# Patient Record
Sex: FEMALE | Race: WHITE | Employment: OTHER | URBAN - NONMETROPOLITAN AREA
[De-identification: names, ages, dates, MRNs, and addresses within clinical notes are randomized per-mention and may not be internally consistent; named-entity substitution may affect disease eponyms.]

---

## 2020-12-12 ENCOUNTER — HOSPITAL ENCOUNTER (EMERGENCY)
Age: 80
Discharge: HOME | End: 2020-12-12
Attending: EMERGENCY MEDICINE
Payer: MEDICARE

## 2020-12-12 VITALS
TEMPERATURE: 99.8 F | RESPIRATION RATE: 16 BRPM | WEIGHT: 140 LBS | DIASTOLIC BLOOD PRESSURE: 79 MMHG | HEART RATE: 79 BPM | BODY MASS INDEX: 25.76 KG/M2 | HEIGHT: 62 IN | SYSTOLIC BLOOD PRESSURE: 132 MMHG | OXYGEN SATURATION: 95 %

## 2020-12-12 PROCEDURE — 99283 EMERGENCY DEPT VISIT LOW MDM: CPT

## 2020-12-12 PROCEDURE — 6370000000 HC RX 637 (ALT 250 FOR IP): Performed by: EMERGENCY MEDICINE

## 2020-12-12 RX ORDER — CEPHALEXIN 500 MG/1
500 CAPSULE ORAL 3 TIMES DAILY
Qty: 30 CAPSULE | Refills: 0 | Status: SHIPPED | OUTPATIENT
Start: 2020-12-12 | End: 2020-12-22

## 2020-12-12 RX ORDER — CEPHALEXIN 500 MG/1
500 CAPSULE ORAL ONCE
Status: COMPLETED | OUTPATIENT
Start: 2020-12-12 | End: 2020-12-12

## 2020-12-12 RX ADMIN — CEPHALEXIN 500 MG: 500 CAPSULE ORAL at 20:27

## 2020-12-12 ASSESSMENT — PAIN SCALES - GENERAL: PAINLEVEL_OUTOF10: 3

## 2020-12-13 NOTE — ED PROVIDER NOTES
Emergency Department Attending Note    Vinny Gregg MD    Date of ED VIsit: 12/12/2020    CHIEF COMPLAINT  Abscess (Pt c/o redness and swelling to left middle finger x 3 days.)      HISTORY OF PRESENT ILLNESS  July Varghese is a 78 y.o. female  With Vital signs of BP (!) 155/88   Pulse 127   Temp 99.2 °F (37.3 °C) (Oral)   Resp 18   Ht 5' 2\" (1.575 m)   Wt 140 lb (63.5 kg)   SpO2 95%   BMI 25.61 kg/m²  who presents to the ED with a complaint of pain and redness to the left middle finger. Patient seen and evaluated in room 8. Patient comes in the emergency department 3 days after exploring what looks like it may have been a paronychia. But now the symptoms have gotten worse in the posterior aspect of her middle finger near the nail bed is red nonfluctuant. She expressed pus out of the area couple days ago and has been getting redder and that is what prompted her to come into the emergency department for evaluation. Also of note her heart rate was a little bit elevated but she has not taken her medication that keeps her heart rate down so not overly concerned about that at this point and advised her to take medication when she gets home. .  No other complaints, modifying factors or associated symptoms. Patients Past medical history reviewed and listed below  Past Medical History:   Diagnosis Date    Allergic rhinitis     Cancer ([address]     breast    GI bleed     Upper GI Bleed with knee surgery 2012    Glaucoma     bilateral    Hyperlipidemia     Hypertension     Radiation therapy complication 1103    UTI (lower urinary tract infection)      Past Surgical History:   Procedure Laterality Date    BREAST SURGERY  1999    lumpectomy right    CATARACT REMOVAL WITH IMPLANT      bilateral    HYSTERECTOMY      age 34    KNEE SURGERY  2/6/2012    left side       I have reviewed the following from the nursing documentation. No family history on file.   Social History     Socioeconomic History    Marital status:      Spouse name: Not on file    Number of children: Not on file    Years of education: Not on file    Highest education level: Not on file   Occupational History    Not on file   Social Needs    Financial resource strain: Not on file    Food insecurity     Worry: Not on file     Inability: Not on file    Transportation needs     Medical: Not on file     Non-medical: Not on file   Tobacco Use    Smoking status: Former Smoker     Quit date: 1997     Years since quittin.8    Smokeless tobacco: Never Used   Substance and Sexual Activity    Alcohol use: No    Drug use: No    Sexual activity: Not on file   Lifestyle    Physical activity     Days per week: Not on file     Minutes per session: Not on file    Stress: Not on file   Relationships    Social connections     Talks on phone: Not on file     Gets together: Not on file     Attends Islam service: Not on file     Active member of club or organization: Not on file     Attends meetings of clubs or organizations: Not on file     Relationship status: Not on file    Intimate partner violence     Fear of current or ex partner: Not on file     Emotionally abused: Not on file     Physically abused: Not on file     Forced sexual activity: Not on file   Other Topics Concern    Not on file   Social History Narrative    Not on file     No current facility-administered medications for this encounter. Current Outpatient Medications   Medication Sig Dispense Refill    Brinzolamide-Brimonidine (SIMBRINZA) 1-0.2 % SUSP Apply  to eye.  meloxicam (MOBIC) 15 MG tablet Take 15 mg by mouth daily.  aspirin EC 81 MG EC tablet Take 1 tablet by mouth daily. 1 tablet 0    Bimatoprost (LUMIGAN) 0.01 % SOLN Apply 1 drop to eye nightly. Each eye       paroxetine (PAXIL) 20 MG tablet Take 20 mg by mouth every morning.  lisinopril (PRINIVIL;ZESTRIL) 10 MG tablet Take 20 mg by mouth every morning.       simvastatin (ZOCOR) 40 MG tablet Take 40 mg by mouth every morning. Allergies   Allergen Reactions    Oxycodone        REVIEW OF SYSTEMS  10 systems reviewed, pertinent positives per HPI otherwise noted to be negative     PHYSICAL EXAM  BP (!) 155/88   Pulse 127   Temp 99.2 °F (37.3 °C) (Oral)   Resp 18   Ht 5' 2\" (1.575 m)   Wt 140 lb (63.5 kg)   SpO2 95%   BMI 25.61 kg/m²   GENERAL APPEARANCE: Awake and alert. Cooperative. In no obvious distress. HEAD: Normocephalic. Atraumatic. EYES: PERRL. EOM's grossly intact. ENT: Mucous membranes are pink and moist.   NECK: Supple. HEART: RRR. No murmurs. LUNGS: Respirations unlabored. CTAB. Good air exchange. ABDOMEN: Soft. Non-distended. Non-tender. No masses. No organomegaly. No guarding or rebound. EXTREMITIES: No peripheral edema. Moves all extremities equally. All extremities neurovascularly intact. There is posterior swelling and redness to the middle finger of the left hand no pus drainage no fluctuance is able to flex and extend and move the finger without any problems albeit with pain  SKIN: Warm and dry. No acute rashes. NEUROLOGICAL: Alert and oriented. Strength 5/5, sensation intact. Gait normal.   PSYCHIATRIC: Normal mood and affect. No HI or SI expressed to me. RADIOLOGY    If acquired see below     EKG:     If acquired see below       ED COURSE/MDM    So looks like the patient did her own self exploration of a paronychia and made it worse with now a slightly larger infection to the posterior aspect of the finger extending from the paronychia area. So I will not place her on an antibiotic primarily Keflex to a fight the infection. Telling her to give it 2 to 3 days to start working if it does not she should see her primary care physician for further follow-up. Additionally I told her she should warm soak it whenever possible.          The ED course and plan were reviewed and results discussed with the patient    The patient understood and agreed with the Discharge/transfer planning.     CLINICAL IMPRESSION and DISPOSITION    Rigoberto Guzman was stable and diagnosed with finger infection    Patient was treated with Aron Combs MD  12/12/20 Jodi Neil MD  12/12/20 [id number]

## 2021-04-17 ENCOUNTER — HOSPITAL ENCOUNTER (OUTPATIENT)
Age: 81
Discharge: HOME | End: 2021-04-17
Payer: MEDICARE

## 2021-04-17 LAB
A/G RATIO: 1.4 (ref 1.1–2.2)
ALBUMIN SERPL-MCNC: 4.1 G/DL (ref 3.4–5)
ALP BLD-CCNC: 214 U/L (ref 40–129)
ALT SERPL-CCNC: 10 U/L (ref 10–40)
ANION GAP SERPL CALCULATED.3IONS-SCNC: 12 MMOL/L (ref 3–16)
AST SERPL-CCNC: 21 U/L (ref 15–37)
BILIRUB SERPL-MCNC: 0.4 MG/DL (ref 0–1)
BUN BLDV-MCNC: 25 MG/DL (ref 7–20)
CALCIUM SERPL-MCNC: 9.6 MG/DL (ref 8.3–10.6)
CHLORIDE BLD-SCNC: 99 MMOL/L (ref 99–110)
CO2: 28 MMOL/L (ref 21–32)
CREAT SERPL-MCNC: 0.6 MG/DL (ref 0.6–1.2)
GFR AFRICAN AMERICAN: >60
GFR NON-AFRICAN AMERICAN: >60
GLOBULIN: 2.9 G/DL
GLUCOSE FASTING: 87 MG/DL (ref 70–99)
POTASSIUM SERPL-SCNC: 5.3 MMOL/L (ref 3.5–5.1)
SODIUM BLD-SCNC: 139 MMOL/L (ref 136–145)
TOTAL PROTEIN: 7 G/DL (ref 6.4–8.2)

## 2021-04-17 PROCEDURE — 85025 COMPLETE CBC W/AUTO DIFF WBC: CPT

## 2021-04-17 PROCEDURE — 83036 HEMOGLOBIN GLYCOSYLATED A1C: CPT

## 2021-04-17 PROCEDURE — 80053 COMPREHEN METABOLIC PANEL: CPT

## 2021-04-17 PROCEDURE — 82306 VITAMIN D 25 HYDROXY: CPT

## 2021-04-17 PROCEDURE — 80061 LIPID PANEL: CPT

## 2021-04-17 PROCEDURE — 84443 ASSAY THYROID STIM HORMONE: CPT

## 2021-04-17 PROCEDURE — 36415 COLL VENOUS BLD VENIPUNCTURE: CPT

## 2021-04-18 LAB
CHOLESTEROL, FASTING: 118 MG/DL (ref 0–199)
ESTIMATED AVERAGE GLUCOSE: 105.4 MG/DL
HBA1C MFR BLD: 5.3 %
HDLC SERPL-MCNC: 27 MG/DL (ref 40–60)
LDL CHOLESTEROL CALCULATED: 50 MG/DL
TRIGLYCERIDE, FASTING: 207 MG/DL (ref 0–150)
TSH SERPL DL<=0.05 MIU/L-ACNC: 2.55 UIU/ML (ref 0.27–4.2)
VITAMIN D 25-HYDROXY: 36.9 NG/ML
VLDLC SERPL CALC-MCNC: 41 MG/DL

## 2021-04-19 LAB
BANDED NEUTROPHILS RELATIVE PERCENT: 14 % (ref 0–7)
BASOPHILS ABSOLUTE: 0 K/UL (ref 0–0.2)
BASOPHILS RELATIVE PERCENT: 0 %
BLASTS RELATIVE PERCENT: 1 %
EOSINOPHILS ABSOLUTE: 3.8 K/UL (ref 0–0.6)
EOSINOPHILS RELATIVE PERCENT: 3 %
HCT VFR BLD CALC: 41.5 % (ref 36–48)
HEMATOLOGY PATH CONSULT: ABNORMAL
HEMATOLOGY PATH CONSULT: NORMAL
HEMOGLOBIN: 12.7 G/DL (ref 12–16)
LYMPHOCYTES ABSOLUTE: 3.8 K/UL (ref 1–5.1)
LYMPHOCYTES RELATIVE PERCENT: 3 %
MCH RBC QN AUTO: 28.4 PG (ref 26–34)
MCHC RBC AUTO-ENTMCNC: 30.6 G/DL (ref 31–36)
MCV RBC AUTO: 93.1 FL (ref 80–100)
METAMYELOCYTES RELATIVE PERCENT: 2 %
MONOCYTES ABSOLUTE: 10.1 K/UL (ref 0–1.3)
MONOCYTES RELATIVE PERCENT: 8 %
MONONUCLEAR UNIDENTIFIED CELLS: 2 %
MYELOCYTE PERCENT: 4 %
NEUTROPHILS ABSOLUTE: 107.5 K/UL (ref 1.7–7.7)
NEUTROPHILS RELATIVE PERCENT: 64 %
NUCLEATED RED BLOOD CELLS: 1 /100 WBC
PDW BLD-RTO: 16.4 % (ref 12.4–15.4)
PLATELET # BLD: 353 K/UL (ref 135–450)
PMV BLD AUTO: 8.7 FL (ref 5–10.5)
PROMYELOCYTES PERCENT: 1 %
RBC # BLD: 4.45 M/UL (ref 4–5.2)
WBC # BLD: 126.5 K/UL (ref 4–11)

## 2021-08-06 ENCOUNTER — HOSPITAL ENCOUNTER (EMERGENCY)
Age: 81
Discharge: HOME | End: 2021-08-06
Attending: EMERGENCY MEDICINE
Payer: MEDICARE

## 2021-08-06 VITALS
HEART RATE: 88 BPM | HEIGHT: 61 IN | TEMPERATURE: 98.4 F | SYSTOLIC BLOOD PRESSURE: 151 MMHG | WEIGHT: 125 LBS | OXYGEN SATURATION: 100 % | DIASTOLIC BLOOD PRESSURE: 76 MMHG | BODY MASS INDEX: 23.6 KG/M2 | RESPIRATION RATE: 16 BRPM

## 2021-08-06 DIAGNOSIS — R73.9 HYPERGLYCEMIA: ICD-10-CM

## 2021-08-06 DIAGNOSIS — F41.1 ANXIETY STATE: Primary | ICD-10-CM

## 2021-08-06 LAB
CHP ED QC CHECK: NORMAL
GLUCOSE BLD-MCNC: 125 MG/DL
GLUCOSE BLD-MCNC: 125 MG/DL (ref 70–99)
PERFORMED ON: ABNORMAL

## 2021-08-06 PROCEDURE — 99284 EMERGENCY DEPT VISIT MOD MDM: CPT

## 2021-08-07 NOTE — ED PROVIDER NOTES
81f  2 mos panic attack  Prev Hx of same  paxil med -   Under stress,    citalipram  Panic attack  Worried of sugard  Ran out of test strips  Now feels fine knowing sugar  Emergency Department Attending Note    Misty Jon MD    Date of ED VIsit: 2021    CHIEF COMPLAINT  Anxiety (Pt taken off her Glipizide last week. Currently having anxiety r/t her BGL which she can't check at home)      [address] [facility] [employer] [employer] [employer]  Maggy Mcgrath is a 78 y.o. female  With Vital signs of BP (!) 151/76   Pulse 88   Temp 98.4 °F (36.9 °C) (Oral)   Resp 16   Ht 5' 1\" (1.549 m)   Wt 125 lb (56.7 kg)   SpO2 100%   Breastfeeding No   BMI 23.62 kg/m²  who presents to the ED with a complaint of panic attack that is resolved because of concern for her blood sugar since her blood sugar medicine has been changed recently. Turns out her blood sugar was one twenty-five byPOCT testing. Patient has no symptoms and is actually eager to go to a wedding in [place]. . Patient seen and evaluated in room 7. No other complaints, modifying factors or associated symptoms. Patients Past medical history reviewed and listed below  Past Medical History:   Diagnosis Date    Allergic rhinitis     Cancer ([address]     breast    GI bleed     Upper GI Bleed with knee surgery     Glaucoma     bilateral    Hyperlipidemia     Hypertension     Radiation therapy complication 7548    UTI (lower urinary tract infection)      Past Surgical History:   Procedure Laterality Date    BREAST SURGERY      lumpectomy right    CATARACT REMOVAL WITH IMPLANT      bilateral    HYSTERECTOMY      age 34    KNEE SURGERY  2012    left side       I have reviewed the following from the nursing documentation. History reviewed. No pertinent family history.   Social History     Socioeconomic History    Marital status:      Spouse name: Not on file    Number of children: Not on file    Years of education: Not on file    Highest education level: Not on file   Occupational History    Not on file   Tobacco Use    Smoking status: Former Smoker     Quit date: 1997     Years since quittin.4    Smokeless tobacco: Never Used   Substance and Sexual Activity    Alcohol use: No    Drug use: No    Sexual activity: Not on file   Other Topics Concern    Not on file   Social History Narrative    Not on file     Social Determinants of Health     Financial Resource Strain:     Difficulty of Paying Living Expenses:    Food Insecurity:     Worried About Running Out of Food in the Last Year:     [address] in the Last Year:    Transportation Needs:     Lack of Transportation (Medical):  Lack of Transportation (Non-Medical):    Physical Activity:     Days of Exercise per Week:     Minutes of Exercise per Session:    Stress:     Feeling of Stress :    Social Connections:     Frequency of Communication with Friends and Family:     Frequency of Social Gatherings with Friends and Family:     Attends Zoroastrianism Services:     Active Member of Clubs or Organizations:     Attends Club or Organization Meetings:     Marital Status:    Intimate Partner Violence:     Fear of Current or Ex-Partner:     Emotionally Abused:     Physically Abused:     Sexually Abused:      No current facility-administered medications for this encounter. Current Outpatient Medications   Medication Sig Dispense Refill    Brinzolamide-Brimonidine (SIMBRINZA) 1-0.2 % SUSP Apply  to eye.  meloxicam (MOBIC) 15 MG tablet Take 15 mg by mouth daily.  aspirin EC 81 MG EC tablet Take 1 tablet by mouth daily. 1 tablet 0    Bimatoprost (LUMIGAN) 0.01 % SOLN Apply 1 drop to eye nightly. Each eye       paroxetine (PAXIL) 20 MG tablet Take 20 mg by mouth every morning.  lisinopril (PRINIVIL;ZESTRIL) 10 MG tablet Take 20 mg by mouth every morning.  simvastatin (ZOCOR) 40 MG tablet Take 40 mg by mouth every morning. Allergies   Allergen Reactions    Oxycodone        REVIEW OF SYSTEMS  10 systems reviewed, pertinent positives per HPI otherwise noted to be negative    PHYSICAL EXAM  BP (!) 151/76   Pulse 88   Temp 98.4 °F (36.9 °C) (Oral)   Resp 16   Ht 5' 1\" (1.549 m)   Wt 125 lb (56.7 kg)   SpO2 100%   Breastfeeding No   BMI 23.62 kg/m²   GENERAL APPEARANCE: Awake and alert. Cooperative. In no obvious distress. HEAD: Normocephalic. Atraumatic. EYES: PERRL. EOM's grossly intact. ENT: Mucous membranes are pink and moist.   NECK: Supple. EXTREMITIES: No peripheral edema. Moves all extremities equally. All extremities neurovascularly intact. SKIN: Warm and dry. No acute rashes. NEUROLOGICAL: Alert and oriented. Strength 5/5, sensation intact. Gait normal.   PSYCHIATRIC: Normal mood and affect. No HI or SI expressed to me. ED COURSE/MDM    So the patient's blood sugar was one twenty-five which calmed her down and put her 80s so she is eating eager to get on the road so she will be discharged with a diagnosis of mild hyperglycemia in the context of diabetes. ED Course as of Aug 06 2051   Fri Aug 06, 2021   2045 Glucose: 125 [DL]      ED Course User Index  [DL] Adela Sommer MD       The ED course and plan were reviewed and results discussed with the patient    The patient understood and agreed with the Discharge/transfer planning.     CLINICAL IMPRESSION and DISPOSITION    Coco Payan was stable and diagnosed with hyperglycemia and panic attack    Patient was treated with glucose check               Adela Sommer MD  08/06/21 2054

## 2021-09-17 ENCOUNTER — OFFICE VISIT (OUTPATIENT)
Dept: CARDIOLOGY CLINIC | Age: 81
End: 2021-09-17
Payer: MEDICARE

## 2021-09-17 VITALS
DIASTOLIC BLOOD PRESSURE: 42 MMHG | HEIGHT: 61 IN | WEIGHT: 132 LBS | OXYGEN SATURATION: 97 % | SYSTOLIC BLOOD PRESSURE: 118 MMHG | HEART RATE: 93 BPM | BODY MASS INDEX: 24.92 KG/M2

## 2021-09-17 DIAGNOSIS — I48.91 ATRIAL FIBRILLATION, UNSPECIFIED TYPE (HCC): ICD-10-CM

## 2021-09-17 DIAGNOSIS — G45.9 TIA (TRANSIENT ISCHEMIC ATTACK): ICD-10-CM

## 2021-09-17 DIAGNOSIS — I38 ENDOCARDITIS, UNSPECIFIED CHRONICITY, UNSPECIFIED ENDOCARDITIS TYPE: ICD-10-CM

## 2021-09-17 DIAGNOSIS — I10 ESSENTIAL HYPERTENSION: Primary | ICD-10-CM

## 2021-09-17 DIAGNOSIS — E78.5 HYPERLIPIDEMIA, UNSPECIFIED HYPERLIPIDEMIA TYPE: ICD-10-CM

## 2021-09-17 PROCEDURE — 99205 OFFICE O/P NEW HI 60 MIN: CPT | Performed by: INTERNAL MEDICINE

## 2021-09-17 PROCEDURE — 93000 ELECTROCARDIOGRAM COMPLETE: CPT | Performed by: INTERNAL MEDICINE

## 2021-09-17 RX ORDER — HYDROXYUREA 500 MG/1
CAPSULE ORAL DAILY
COMMUNITY
End: 2022-03-09

## 2021-09-17 RX ORDER — ACETAMINOPHEN 500 MG
500 TABLET ORAL EVERY 6 HOURS PRN
COMMUNITY

## 2021-09-17 RX ORDER — ALLOPURINOL 300 MG/1
300 TABLET ORAL DAILY
COMMUNITY

## 2021-09-17 NOTE — LETTER
Jose Cho  1940        CARDIOLOGY CONSULTATION        Patient Name: Jose Cho  Primary Care physician: Maribeth Aguilera DO    Reason for Referral/Chief Complaint: Jose Cho is a 78 y.o. patient who is referred to cardiology clinic today for evaluation and treatment of reported new AF, possible endocarditis (no records available). History of Present Illness:   Jose Cho is a 78 y.o. female with a past medical history of transient ischemic attack, hyperlipidemia, hypertension atrial fibrillation, breast cancer with prior surgery and radiation of the chest, and mild carotid stenosis. Patient referred to cardiology by [facility]. Patient presents today with her son. 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 Diagnosed with atrial fibrillation at that time. There is question of whether she was diagnosed with infective endocarditis. Today, patient reports that she was hospitalized for 7 days at [address] [place] [address] and then went to an inpatient rehab. She is unsure of whether she received oral antibiotics but strictly denies receiving prolonged course of IV antibiotic. She does confirm she was diagnosed with atrial fibrillation. Denies palpitations, near-syncope or poonam syncope. She gets occasional dizziness with position change, but this only lasts for 1-2 seconds. She has been taking metoprolol as well as anticoagulant. Her labs are followed through [facility] and she has had no bleeding events. States her cell lines have been good. She has not one with Dr. Gosia Jimenez later today and will again have her CBC checked. She reports she lives alone and is able to complete ADLs on her own. No limiting dyspnea. Denies exertional chest pain.    She reports she is no longer taking or lisinopril, simvastatin or ASA, but is taking all other medications as prescribed. She gets occasional BLE edema in her left leg at the end of the day. No paroxysmal nocturnal dyspnea, orthopnea or weight gain. Past Medical History:   has a past medical history of Allergic rhinitis, Cancer ([address] GI bleed, Glaucoma, Hyperlipidemia, Hypertension, Radiation therapy complication, and UTI (lower urinary tract infection). Surgical History:   has a past surgical history that includes Cataract removal with implant; Breast surgery (1999); Hysterectomy; and knee surgery (2/6/2012). Social History:   reports that she quit smoking about 24 years ago. She has never used smokeless tobacco. She reports that she does not drink alcohol and does not use drugs. Family History:  Reports no history of early onset coronary artery disease, myocardial infarction, cerebrovascular accident or sudden cardiac death among primary relatives. Home Medications:  Were reviewed and are listed in nursing record and/or below  Prior to Admission medications    Medication Sig Start Date End Date Taking? Authorizing Provider   hydroxyurea (HYDREA) 500 MG chemo capsule Take by mouth daily   Yes Historical Provider, MD   allopurinol (ZYLOPRIM) 300 MG tablet Take 300 mg by mouth daily   Yes Historical Provider, MD   metoprolol tartrate (LOPRESSOR) 25 MG tablet Take 25 mg by mouth 2 times daily   Yes Historical Provider, MD   apixaban (ELIQUIS) 5 MG TABS tablet Take by mouth 2 times daily   Yes Historical Provider, MD   acetaminophen (TYLENOL) 500 MG tablet Take 500 mg by mouth every 6 hours as needed for Pain   Yes Historical Provider, MD   Brinzolamide-Brimonidine (SIMBRINZA) 1-0.2 % SUSP Apply  to eye. Patient not taking: Reported on 9/17/2021    Historical Provider, MD   meloxicam (MOBIC) 15 MG tablet Take 15 mg by mouth daily. Patient not taking: Reported on 9/17/2021    Historical Provider, MD   Bimatoprost (LUMIGAN) 0.01 % SOLN Apply 1 drop to eye nightly.  Each eye   Patient not taking: Reported on 9/17/2021    Historical Provider, MD   paroxetine (PAXIL) 20 MG tablet Take 20 mg by mouth every morning. Patient not taking: Reported on 9/17/2021    Historical Provider, MD        CURRENT Medications:  No current facility-administered medications for this visit. Allergies:  Oxycodone     Review of Systems:   A 14 point review of symptoms completed. Pertinent positives identified in the HPI, all other review of symptoms negative as below. Objective:     Vitals:    09/17/21 0937 09/17/21 0939   BP: (!) 118/42 (!) 118/42   Pulse: 93    SpO2: 97%    Weight: 132 lb (59.9 kg)    Height: 5' 1\" (1.549 m)       Weight: 132 lb (59.9 kg)       PHYSICAL EXAM:    General:  Alert, cooperative, no distress, appears stated age   Head:  Normocephalic, atraumatic   Eyes:  Conjunctiva/corneas clear, anicteric sclerae    Nose: Nares normal, no drainage or sinus tenderness   Throat: No abnormalities of the lips, oral mucosa or tongue. Neck: Trachea midline. Neck supple with no lymphadenopathy, thyroid not enlarged, symmetric, no tenderness/mass/nodules, no Jugular venous pressure elevation    Lungs:   Clear to auscultation bilaterally, no wheezes, no rales, no respiratory distress   Chest Wall:  No deformity or tenderness to palpation   Heart:  Regular rate and rhythm, normal S1, normal S2, 2/6 systolic ejection murmur at the right sternal border with radiation to the carotid, early peaking with preserved A2, no rub, no S3/S4, PMI non-displaced. Abdomen:   Soft, non-tender, with normoactive bowel sounds. No masses, no hepatosplenomegaly   Extremities: No cyanosis, clubbing or significant pitting edema. Vascular: 2+ radial, 2+ dorsalis pedis and posterior tibial pulses bilaterally. Brisk carotid upstrokes without carotid bruit. Skin: Skin color, texture, turgor are normal with no rashes or ulceration.     Pysch: Euthymic mood, appropriate affect   Neurologic: Oriented to person, place and time. No slurred speech or facial asymmetry. No motor or sensory deficits on gross examination. Labs:   CBC:   Lab Results   Component Value Date    .5 04/17/2021    RBC 4.45 04/17/2021    HGB 12.7 04/17/2021    HCT 41.5 04/17/2021    MCV 93.1 04/17/2021    RDW 16.4 04/17/2021     04/17/2021     LABS from [facility]:      CMP:  Lab Results   Component Value Date     04/17/2021    K 5.3 04/17/2021    CL 99 04/17/2021    CO2 28 04/17/2021    BUN 25 04/17/2021    CREATININE 0.6 04/17/2021    GFRAA >60 04/17/2021    GFRAA 136 03/12/2012    AGRATIO 1.4 04/17/2021    LABGLOM >60 04/17/2021    GLUCOSE 125 08/06/2021    PROT 7.0 04/17/2021    CALCIUM 9.6 04/17/2021    BILITOT 0.4 04/17/2021    ALKPHOS 214 04/17/2021    AST 21 04/17/2021    ALT 10 04/17/2021     PT/INR:  No results found for: PTINR  HgBA1c:  Lab Results   Component Value Date    LABA1C 5.3 04/17/2021     Lab Results   Component Value Date    TROPONINI 0.007 01/22/2014         Cardiac Data:     EKG: Personally reviewed, notable for, with my interpretation: Normal sinus rhythm with normal axis, normal intervals, PAC, possible inferior infarct. Echo 1/2014:    Summary    Normal left ventricle size, wall thickness and systolic function with an    estimated ejection fraction of 55%. No regional wall motion abnormalities    are seen. Grade I diastolic dysfunction is present. Mitral valve is structurally normal.    Mild mitral regurgitation is present. The left atrial size is normal.    The aortic valve is normal in structure and function. No evidence of aortic valve regurgitation. No evidence of aortic stenosis. Carotid doppler bilateral 9/2014:   No hemodynamically significant lesion. No great change from the comparison study \"No significant plaque burden bilaterally\". Impression and Plan:       1. Paroxysmal atrial fibrillation, maintaining sinus exam/ECG today  2.   Reports possible history of endocarditis, unknown valve, unknown organism. Unclear to me that she received antibiotics, will need to confirm diagnosis and treatment plan by reviewing documentation from [place] which has been requested  3. Cardiac murmur, possible aortic stenosis, nonsevere by exam  4. Hypertension  5. Hyperlipidemia  6. Breast cancer status post surgery and radiation  7. History of transient ischemic attack  8. Carotid artery disease    [id number] Score for Atrial Fibrillation Stroke Risk   Risk   Factors  Component Value   C CHF No 0   H HTN Yes 1   A2 Age >= 76 Yes,  (78 y.o.) 2   D DM No 0   S2 Prior Stroke/TIA Yes 2   V Vascular Disease No 0   A Age 74-69 No,  (78 y.o.) 0   Sc Sex female 1    [id number]  Score  6   Score last updated 9/17/21 2:11 PM EDT    Click here for a link to the UpToDate guideline \"Atrial Fibrillation: Anticoagulation therapy to prevent embolization    Disclaimer: Risk Score calculation is dependent on accuracy of patient problem list and past encounter diagnosis. Patient Active Problem List   Diagnosis    Left knee DJD    TIA (transient ischemic attack)    Transient cerebral ischemia    Other and unspecified hyperlipidemia    Essential hypertension    Occlusion and stenosis of carotid artery    HLD (hyperlipidemia)    AF (atrial fibrillation) ([facility] [address]       PLAN:   1. We will obtain records from [address] [place] [address] for further review. 2. Will obtain transthoracic echocardiogram for evaluation of underlying cardiac structure and function. 3. Continue metoprolol and Eliquis twice daily for management of atrial fibrillation. 4.  Work closely with [facility] to ensure she tolerates anticoagulation. Monitor CBC closely   5. Recommended she elevate legs and wear compression stockings for conservative management of edema    Follow up with me in 6 months. This note has been scribed in the presence of Kieran Leslie MD, by Obi Jaramillo RN.      The scribes documentation has been prepared under my direction and personally reviewed by me in its entirety. I confirm that the note above accurately reflects all work, treatment, procedures, and medical decision making performed by me. Toni Lam MD, personally performed the services described in this documentation as scribed by Gustavo Haskins RN. in my presence, and it is both accurate and complete to the best of our ability. I will address the patient's cardiac risk factors and adjusted pharmacologic treatment as needed. In addition, I have reinforced the need for patient directed risk factor modification. All questions and concerns were addressed to the patient/family. Alternatives to my treatment were discussed. Thank you for allowing us to participate in the care of Isabela Mackenzie. Please call me with any questions [phone].     Mel Suggs MD, [facility]  Cardiovascular Disease  [facility]  [phone] [facility]  [phone] [address]  9/17/2021 5:00 PM

## 2021-09-17 NOTE — PROGRESS NOTES
CARDIOLOGY CONSULTATION        Patient Name: Anastacia Mak  Primary Care physician: Katelynn Meyer DO    Reason for Referral/Chief Complaint: Anastacia Mak is a 78 y.o. patient who is referred to cardiology clinic today for evaluation and treatment of reported new AF, possible endocarditis (no records available). History of Present Illness:   Anastacia Mak is a 78 y.o. female with a past medical history of transient ischemic attack, hyperlipidemia, hypertension atrial fibrillation, breast cancer with prior surgery and radiation of the chest, and mild carotid stenosis. Patient referred to cardiology by [facility]. Patient presents today with her son. 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 Diagnosed with atrial fibrillation at that time. There is question of whether she was diagnosed with infective endocarditis. Today, patient reports that she was hospitalized for 7 days at [address] [place] [address] and then went to an inpatient rehab. She is unsure of whether she received oral antibiotics but strictly denies receiving prolonged course of IV antibiotic. She does confirm she was diagnosed with atrial fibrillation. Denies palpitations, near-syncope or poonam syncope. She gets occasional dizziness with position change, but this only lasts for 1-2 seconds. She has been taking metoprolol as well as anticoagulant. Her labs are followed through [facility] and she has had no bleeding events. States her cell lines have been good. She has not one with Dr. Migue Gonzalez later today and will again have her CBC checked. She reports she lives alone and is able to complete ADLs on her own. No limiting dyspnea. Denies exertional chest pain. She reports she is no longer taking or lisinopril, simvastatin or ASA, but is taking all other medications as prescribed.  She Historical Provider, MD   paroxetine (PAXIL) 20 MG tablet Take 20 mg by mouth every morning. Patient not taking: Reported on 9/17/2021    Historical Provider, MD        CURRENT Medications:  No current facility-administered medications for this visit. Allergies:  Oxycodone     Review of Systems:   A 14 point review of symptoms completed. Pertinent positives identified in the HPI, all other review of symptoms negative as below. Objective:     Vitals:    09/17/21 0937 09/17/21 0939   BP: (!) 118/42 (!) 118/42   Pulse: 93    SpO2: 97%    Weight: 132 lb (59.9 kg)    Height: 5' 1\" (1.549 m)       Weight: 132 lb (59.9 kg)       PHYSICAL EXAM:    General:  Alert, cooperative, no distress, appears stated age   Head:  Normocephalic, atraumatic   Eyes:  Conjunctiva/corneas clear, anicteric sclerae    Nose: Nares normal, no drainage or sinus tenderness   Throat: No abnormalities of the lips, oral mucosa or tongue. Neck: Trachea midline. Neck supple with no lymphadenopathy, thyroid not enlarged, symmetric, no tenderness/mass/nodules, no Jugular venous pressure elevation    Lungs:   Clear to auscultation bilaterally, no wheezes, no rales, no respiratory distress   Chest Wall:  No deformity or tenderness to palpation   Heart:  Regular rate and rhythm, normal S1, normal S2, 2/6 systolic ejection murmur at the right sternal border with radiation to the carotid, early peaking with preserved A2, no rub, no S3/S4, PMI non-displaced. Abdomen:   Soft, non-tender, with normoactive bowel sounds. No masses, no hepatosplenomegaly   Extremities: No cyanosis, clubbing or significant pitting edema. Vascular: 2+ radial, 2+ dorsalis pedis and posterior tibial pulses bilaterally. Brisk carotid upstrokes without carotid bruit. Skin: Skin color, texture, turgor are normal with no rashes or ulceration. Pysch: Euthymic mood, appropriate affect   Neurologic: Oriented to person, place and time.  No slurred speech or facial asymmetry. No motor or sensory deficits on gross examination. Labs:   CBC:   Lab Results   Component Value Date    .5 04/17/2021    RBC 4.45 04/17/2021    HGB 12.7 04/17/2021    HCT 41.5 04/17/2021    MCV 93.1 04/17/2021    RDW 16.4 04/17/2021     04/17/2021     LABS from [facility]:      CMP:  Lab Results   Component Value Date     04/17/2021    K 5.3 04/17/2021    CL 99 04/17/2021    CO2 28 04/17/2021    BUN 25 04/17/2021    CREATININE 0.6 04/17/2021    GFRAA >60 04/17/2021    GFRAA 136 03/12/2012    AGRATIO 1.4 04/17/2021    LABGLOM >60 04/17/2021    GLUCOSE 125 08/06/2021    PROT 7.0 04/17/2021    CALCIUM 9.6 04/17/2021    BILITOT 0.4 04/17/2021    ALKPHOS 214 04/17/2021    AST 21 04/17/2021    ALT 10 04/17/2021     PT/INR:  No results found for: PTINR  HgBA1c:  Lab Results   Component Value Date    LABA1C 5.3 04/17/2021     Lab Results   Component Value Date    TROPONINI 0.007 01/22/2014         Cardiac Data:     EKG: Personally reviewed, notable for, with my interpretation: Normal sinus rhythm with normal axis, normal intervals, PAC, possible inferior infarct. Echo 1/2014:    Summary    Normal left ventricle size, wall thickness and systolic function with an    estimated ejection fraction of 55%. No regional wall motion abnormalities    are seen. Grade I diastolic dysfunction is present. Mitral valve is structurally normal.    Mild mitral regurgitation is present. The left atrial size is normal.    The aortic valve is normal in structure and function. No evidence of aortic valve regurgitation. No evidence of aortic stenosis. Carotid doppler bilateral 9/2014:   No hemodynamically significant lesion. No great change from the comparison study \"No significant plaque burden bilaterally\". Impression and Plan:       1. Paroxysmal atrial fibrillation, maintaining sinus exam/ECG today  2. Reports possible history of endocarditis, unknown valve, unknown organism. Unclear to me that she received antibiotics, will need to confirm diagnosis and treatment plan by reviewing documentation from [place] which has been requested  3. Cardiac murmur, possible aortic stenosis, nonsevere by exam  4. Hypertension  5. Hyperlipidemia  6. Breast cancer status post surgery and radiation  7. History of transient ischemic attack  8. Carotid artery disease    [id number] Score for Atrial Fibrillation Stroke Risk   Risk   Factors  Component Value   C CHF No 0   H HTN Yes 1   A2 Age >= 76 Yes,  (78 y.o.) 2   D DM No 0   S2 Prior Stroke/TIA Yes 2   V Vascular Disease No 0   A Age 74-69 No,  (78 y.o.) 0   Sc Sex female 1    [id number]  Score  6   Score last updated 9/17/21 1:65 PM EDT    Click here for a link to the UpToDate guideline \"Atrial Fibrillation: Anticoagulation therapy to prevent embolization    Disclaimer: Risk Score calculation is dependent on accuracy of patient problem list and past encounter diagnosis. Patient Active Problem List   Diagnosis    Left knee DJD    TIA (transient ischemic attack)    Transient cerebral ischemia    Other and unspecified hyperlipidemia    Essential hypertension    Occlusion and stenosis of carotid artery    HLD (hyperlipidemia)    AF (atrial fibrillation) ([facility] [address]       PLAN:   1. We will obtain records from [address] [place] [address] for further review. 2. Will obtain transthoracic echocardiogram for evaluation of underlying cardiac structure and function. 3. Continue metoprolol and Eliquis twice daily for management of atrial fibrillation. 4.  Work closely with [facility] to ensure she tolerates anticoagulation. Monitor CBC closely   5. Recommended she elevate legs and wear compression stockings for conservative management of edema    Follow up with me in 6 months. This note has been scribed in the presence of Jj Girard MD, by Mayuri Phelps RN.      The scribes documentation has been prepared under my direction and personally reviewed by me in its entirety. I confirm that the note above accurately reflects all work, treatment, procedures, and medical decision making performed by me. Catherine Starks MD, personally performed the services described in this documentation as scribed by Trevor Knapp RN. in my presence, and it is both accurate and complete to the best of our ability. I will address the patient's cardiac risk factors and adjusted pharmacologic treatment as needed. In addition, I have reinforced the need for patient directed risk factor modification. All questions and concerns were addressed to the patient/family. Alternatives to my treatment were discussed. Thank you for allowing us to participate in the care of Cynthia Morales. Please call me with any questions [phone].     Andre Conway MD, [facility]  Cardiovascular Disease  [facility]  [phone] [address] [facility]  [phone] [address]  9/17/2021 5:00 PM

## 2021-09-17 NOTE — PATIENT INSTRUCTIONS
PLAN:   1. We will obtain records from [facility]. 2. Echo to assess heart structures and function. 3. Elevate legs and wear compression stockings if you experience lower leg edema. 4. Follow up with me in 6 months. Your provider has ordered testing for further evaluation. An order/prescription has been included in your paper work.  To schedule outpatient testing, contact Central Scheduling by calling [address] [place] [address] ([phone]).

## 2021-09-30 ENCOUNTER — HOSPITAL ENCOUNTER (OUTPATIENT)
Dept: NON INVASIVE DIAGNOSTICS | Age: 81
Discharge: HOME | End: 2021-09-30
Payer: MEDICARE

## 2021-09-30 DIAGNOSIS — I38 ENDOCARDITIS, UNSPECIFIED CHRONICITY, UNSPECIFIED ENDOCARDITIS TYPE: ICD-10-CM

## 2021-09-30 LAB
LV EF: 58 %
LVEF MODALITY: NORMAL

## 2021-09-30 PROCEDURE — 93306 TTE W/DOPPLER COMPLETE: CPT

## 2021-10-01 ENCOUNTER — TELEPHONE (OUTPATIENT)
Dept: CARDIOLOGY CLINIC | Age: 81
End: 2021-10-01

## 2021-10-01 NOTE — TELEPHONE ENCOUNTER
----- Message from Mihai Gray MD sent at 9/30/2021  4:07 PM EDT -----  Pls let the patient know, echocardiogram shows normal pump function and strength. There is evidence of a possible healed vegetation of the aortic valve, this may have been what was treated in [place]. No significant sequelae of infection, only mild leakiness through the valve. Will monitor her clinically. Follow up as planned 6 months unless issues arise in the interim.

## 2021-10-04 ENCOUNTER — HOSPITAL ENCOUNTER (OUTPATIENT)
Age: 81
Discharge: HOME | End: 2021-10-04
Payer: MEDICARE

## 2021-10-04 LAB
ABO/RH: NORMAL
ANTIBODY SCREEN: NORMAL

## 2021-10-04 PROCEDURE — 36415 COLL VENOUS BLD VENIPUNCTURE: CPT

## 2021-10-04 PROCEDURE — 86923 COMPATIBILITY TEST ELECTRIC: CPT

## 2021-10-04 PROCEDURE — 86850 RBC ANTIBODY SCREEN: CPT

## 2021-10-04 PROCEDURE — 86901 BLOOD TYPING SEROLOGIC RH(D): CPT

## 2021-10-04 PROCEDURE — 86900 BLOOD TYPING SEROLOGIC ABO: CPT

## 2021-10-06 ENCOUNTER — HOSPITAL ENCOUNTER (OUTPATIENT)
Dept: NURSING | Age: 81
Setting detail: INFUSION SERIES
Discharge: HOME | End: 2021-10-06
Payer: MEDICARE

## 2021-10-06 VITALS
BODY MASS INDEX: 24.92 KG/M2 | HEIGHT: 61 IN | DIASTOLIC BLOOD PRESSURE: 64 MMHG | HEART RATE: 78 BPM | TEMPERATURE: 98.1 F | RESPIRATION RATE: 16 BRPM | SYSTOLIC BLOOD PRESSURE: 120 MMHG | WEIGHT: 132 LBS

## 2021-10-06 LAB
BLOOD BANK DISPENSE STATUS: NORMAL
BLOOD BANK PRODUCT CODE: NORMAL
BPU ID: NORMAL
DESCRIPTION BLOOD BANK: NORMAL

## 2021-10-06 PROCEDURE — P9016 RBC LEUKOCYTES REDUCED: HCPCS

## 2021-10-06 PROCEDURE — 36430 TRANSFUSION BLD/BLD COMPNT: CPT

## 2021-10-06 PROCEDURE — 99203 OFFICE O/P NEW LOW 30 MIN: CPT

## 2021-10-06 PROCEDURE — 6370000000 HC RX 637 (ALT 250 FOR IP): Performed by: INTERNAL MEDICINE

## 2021-10-06 RX ORDER — ACETAMINOPHEN 325 MG/1
650 TABLET ORAL ONCE
Status: COMPLETED | OUTPATIENT
Start: 2021-10-06 | End: 2021-10-06

## 2021-10-06 RX ADMIN — ACETAMINOPHEN 650 MG: 325 TABLET ORAL at 07:49

## 2021-10-06 ASSESSMENT — PAIN SCALES - GENERAL: PAINLEVEL_OUTOF10: 0

## 2021-10-06 NOTE — PROGRESS NOTES
Pt here ambulatory for 1 unit of PRBC's  pt without c/o's at present time but reports that she is very tired all the time  Current H&H is 7.8 & 25.0  Blood transfusion process discussed with pt and her family and understanding verbalized   Blood consent obtained  IV # 20 was   started in LAC on 1st attempt per myself  Pt cassandra well  Pre meds of tylenol 650 mg was given then  Unit of PRBC's started  IV site unremarkable  Pt cassandra well  Will stay with pt for 15 mins to monitor for signs of adverse reactions  Blankets have been applied for comfort  Legs elevated

## 2021-10-06 NOTE — PROGRESS NOTES
Have been at pt's side for 15 mins plus Blood infusing well without any signs of adverse reactions  IV site unremarkable  Pt refused breakfast today  Will continue to monitor  Family remains at side

## 2021-10-06 NOTE — PROGRESS NOTES
No changes noted  Pt has been resting quietly and dozing at times  Blood continues to infuse without any signs of adverse reactions  IV site unremarkable   Will continue to monitor  Family remains at side

## 2021-10-06 NOTE — PROGRESS NOTES
Blood tubing clear  IV was removed  Cath tip intact  Pressure then pressure dressing applied and was secured with a coban dressing  IV site unremarkable  Pt cassandra well   Discharge instructions were then reviewed with pt and her son  Understanding was verbalized and copy was given  Pt was then discharged ambulatory in stable condition with her son

## 2022-02-02 ENCOUNTER — HOSPITAL ENCOUNTER (EMERGENCY)
Age: 82
Discharge: HOME | End: 2022-02-03
Attending: EMERGENCY MEDICINE
Payer: MEDICARE

## 2022-02-02 ENCOUNTER — APPOINTMENT (OUTPATIENT)
Dept: GENERAL RADIOLOGY | Age: 82
End: 2022-02-02
Payer: MEDICARE

## 2022-02-02 DIAGNOSIS — K22.4 ESOPHAGEAL SPASM: Primary | ICD-10-CM

## 2022-02-02 LAB
A/G RATIO: 1.9 (ref 1.1–2.2)
ALBUMIN SERPL-MCNC: 4.4 G/DL (ref 3.4–5)
ALP BLD-CCNC: 119 U/L (ref 40–129)
ALT SERPL-CCNC: 7 U/L (ref 10–40)
ANION GAP SERPL CALCULATED.3IONS-SCNC: 14 MMOL/L (ref 3–16)
AST SERPL-CCNC: 7 U/L (ref 15–37)
BILIRUB SERPL-MCNC: 0.5 MG/DL (ref 0–1)
BUN BLDV-MCNC: 25 MG/DL (ref 7–20)
CALCIUM SERPL-MCNC: 9.3 MG/DL (ref 8.3–10.6)
CHLORIDE BLD-SCNC: 99 MMOL/L (ref 99–110)
CO2: 23 MMOL/L (ref 21–32)
CREAT SERPL-MCNC: 0.8 MG/DL (ref 0.6–1.2)
GFR AFRICAN AMERICAN: >60
GFR NON-AFRICAN AMERICAN: >60
GLUCOSE BLD-MCNC: 206 MG/DL (ref 70–99)
POTASSIUM REFLEX MAGNESIUM: 4.1 MMOL/L (ref 3.5–5.1)
SODIUM BLD-SCNC: 136 MMOL/L (ref 136–145)
TOTAL PROTEIN: 6.7 G/DL (ref 6.4–8.2)
TROPONIN: <0.01 NG/ML

## 2022-02-02 PROCEDURE — 80053 COMPREHEN METABOLIC PANEL: CPT

## 2022-02-02 PROCEDURE — 2580000003 HC RX 258: Performed by: EMERGENCY MEDICINE

## 2022-02-02 PROCEDURE — 6370000000 HC RX 637 (ALT 250 FOR IP)

## 2022-02-02 PROCEDURE — 6370000000 HC RX 637 (ALT 250 FOR IP): Performed by: EMERGENCY MEDICINE

## 2022-02-02 PROCEDURE — 85025 COMPLETE CBC W/AUTO DIFF WBC: CPT

## 2022-02-02 PROCEDURE — 93005 ELECTROCARDIOGRAM TRACING: CPT | Performed by: EMERGENCY MEDICINE

## 2022-02-02 PROCEDURE — 84484 ASSAY OF TROPONIN QUANT: CPT

## 2022-02-02 PROCEDURE — 99285 EMERGENCY DEPT VISIT HI MDM: CPT

## 2022-02-02 PROCEDURE — 36415 COLL VENOUS BLD VENIPUNCTURE: CPT

## 2022-02-02 PROCEDURE — 71045 X-RAY EXAM CHEST 1 VIEW: CPT

## 2022-02-02 RX ORDER — FAMOTIDINE 20 MG/1
TABLET, FILM COATED ORAL
Status: ON HOLD | COMMUNITY
Start: 2022-01-20 | End: 2022-02-15 | Stop reason: HOSPADM

## 2022-02-02 RX ORDER — NITROGLYCERIN 0.4 MG/1
0.4 TABLET SUBLINGUAL ONCE
Status: COMPLETED | OUTPATIENT
Start: 2022-02-02 | End: 2022-02-03

## 2022-02-02 RX ORDER — PANTOPRAZOLE SODIUM 40 MG/1
40 TABLET, DELAYED RELEASE ORAL
Status: DISCONTINUED | OUTPATIENT
Start: 2022-02-03 | End: 2022-02-03 | Stop reason: HOSPADM

## 2022-02-02 RX ORDER — PANTOPRAZOLE SODIUM 40 MG/1
TABLET, DELAYED RELEASE ORAL
Status: COMPLETED
Start: 2022-02-02 | End: 2022-02-02

## 2022-02-02 RX ORDER — 0.9 % SODIUM CHLORIDE 0.9 %
1000 INTRAVENOUS SOLUTION INTRAVENOUS ONCE
Status: COMPLETED | OUTPATIENT
Start: 2022-02-02 | End: 2022-02-02

## 2022-02-02 RX ORDER — ASPIRIN 325 MG
325 TABLET ORAL ONCE
Status: COMPLETED | OUTPATIENT
Start: 2022-02-02 | End: 2022-02-02

## 2022-02-02 RX ADMIN — PANTOPRAZOLE SODIUM 40 MG: 40 TABLET, DELAYED RELEASE ORAL at 23:07

## 2022-02-02 RX ADMIN — LIDOCAINE HYDROCHLORIDE: 20 SOLUTION ORAL; TOPICAL at 23:04

## 2022-02-02 RX ADMIN — ASPIRIN 325 MG: 325 TABLET ORAL at 23:04

## 2022-02-02 RX ADMIN — SODIUM CHLORIDE 1000 ML: 9 INJECTION, SOLUTION INTRAVENOUS at 22:40

## 2022-02-02 RX ADMIN — NITROGLYCERIN 0.4 MG: 0.4 TABLET SUBLINGUAL at 23:17

## 2022-02-02 ASSESSMENT — PAIN SCALES - GENERAL
PAINLEVEL_OUTOF10: 8
PAINLEVEL_OUTOF10: 0
PAINLEVEL_OUTOF10: 10

## 2022-02-03 VITALS
TEMPERATURE: 98.2 F | HEART RATE: 79 BPM | RESPIRATION RATE: 18 BRPM | WEIGHT: 130 LBS | BODY MASS INDEX: 24.55 KG/M2 | HEIGHT: 61 IN | SYSTOLIC BLOOD PRESSURE: 136 MMHG | DIASTOLIC BLOOD PRESSURE: 62 MMHG | OXYGEN SATURATION: 94 %

## 2022-02-03 LAB
BASOPHILS ABSOLUTE: 0 K/UL (ref 0–0.2)
BASOPHILS RELATIVE PERCENT: 0.3 %
EKG ATRIAL RATE: 91 BPM
EKG DIAGNOSIS: NORMAL
EKG P AXIS: 42 DEGREES
EKG P-R INTERVAL: 162 MS
EKG Q-T INTERVAL: 354 MS
EKG QRS DURATION: 90 MS
EKG QTC CALCULATION (BAZETT): 435 MS
EKG R AXIS: 36 DEGREES
EKG T AXIS: -23 DEGREES
EKG VENTRICULAR RATE: 91 BPM
EOSINOPHILS ABSOLUTE: 0.1 K/UL (ref 0–0.6)
EOSINOPHILS RELATIVE PERCENT: 0.8 %
HCT VFR BLD CALC: 26.2 % (ref 36–48)
HEMATOLOGY PATH CONSULT: NO
HEMOGLOBIN: 8.4 G/DL (ref 12–16)
LYMPHOCYTES ABSOLUTE: 1.1 K/UL (ref 1–5.1)
LYMPHOCYTES RELATIVE PERCENT: 7.4 %
MCH RBC QN AUTO: 37.2 PG (ref 26–34)
MCHC RBC AUTO-ENTMCNC: 32 G/DL (ref 31–36)
MCV RBC AUTO: 116.1 FL (ref 80–100)
MONOCYTES ABSOLUTE: 0.6 K/UL (ref 0–1.3)
MONOCYTES RELATIVE PERCENT: 3.8 %
NEUTROPHILS ABSOLUTE: 13.3 K/UL (ref 1.7–7.7)
NEUTROPHILS RELATIVE PERCENT: 87.7 %
PDW BLD-RTO: 23.7 % (ref 12.4–15.4)
PLATELET # BLD: 112 K/UL (ref 135–450)
PMV BLD AUTO: 7.9 FL (ref 5–10.5)
RBC # BLD: 2.26 M/UL (ref 4–5.2)
TROPONIN: <0.01 NG/ML
WBC # BLD: 15.2 K/UL (ref 4–11)

## 2022-02-03 PROCEDURE — 36415 COLL VENOUS BLD VENIPUNCTURE: CPT

## 2022-02-03 PROCEDURE — 93010 ELECTROCARDIOGRAM REPORT: CPT | Performed by: INTERNAL MEDICINE

## 2022-02-03 PROCEDURE — 84484 ASSAY OF TROPONIN QUANT: CPT

## 2022-02-03 PROCEDURE — 6370000000 HC RX 637 (ALT 250 FOR IP)

## 2022-02-03 RX ORDER — NITROGLYCERIN 0.4 MG/1
TABLET SUBLINGUAL
Status: COMPLETED
Start: 2022-02-03 | End: 2022-02-03

## 2022-02-03 RX ORDER — NIFEDIPINE 30 MG/1
30 TABLET, EXTENDED RELEASE ORAL DAILY
Qty: 7 TABLET | Refills: 0 | Status: SHIPPED | OUTPATIENT
Start: 2022-02-03 | End: 2022-03-09

## 2022-02-03 RX ADMIN — NITROGLYCERIN 0.4 MG: 0.4 TABLET SUBLINGUAL at 00:46

## 2022-02-03 ASSESSMENT — PAIN SCALES - GENERAL: PAINLEVEL_OUTOF10: 0

## 2022-02-03 NOTE — ED NOTES
Repeat troponin collected at this time. Pt stating she is still pain free.      Anna Lindo RN  02/03/22 2949

## 2022-02-03 NOTE — ED PROVIDER NOTES
Emergency Department Attending Note    Author MD Kennedy    Date of ED VIsit: 2/2/2022    CHIEF COMPLAINT  Chest Pain (Pt ambulates into ED with complaints of intermittent chest pain that has been going on for 3 days. States it is usually worse at night. But today it has been going on all day today. States pain comes and goes and lasts about 10-15 minutes per episode. Pt states she is not sure if it is acid reflux or not. Pain noted to misternal area.)      HISTORY OF PRESENT ILLNESS  Patrick Felipe is a 78 y.o. female  With Vital signs of BP (!) 158/83   Pulse 85   Temp 98.2 °F (36.8 °C) (Oral)   Resp 18   Ht 5' 1\" (1.549 m)   Wt 130 lb (59 kg)   SpO2 100%   BMI 24.56 kg/m²  who presents to the ED with a complaint of crescendo type chest pain for the past 3 days that lasts approximately 5 to 10 minutes and then resolves on its own. Patient describes it as a burning type sensation. Sometimes it radiates to the back but not all the time. As mentioned this is been going on for 3 days and she has never had this before and is not related to exertion. She says it is worse when she lays down at night. And goes away when she sits up. There is never any shortness of breath or diaphoresis with this. .  Patient's HEART score is below patient seen and evaluated in room 7. No other complaints, modifying factors or associated symptoms. HEART SCORE:    History: +0 for low suspicion  EKG: +1 for nonspecific changes   Age: +4 for age >65 years  Risk factors (includes HLD, HTN, DM, tobacco use, obesity, and +FHx): +1 for 1-2 risk factors  Initial troponin: +0 for negative troponin    Heart score: 4.   This falls under the following category: Score of 4-6, which indicates low/moderate risk for major adverse cardiac event and supports observation with repeated troponins and/or non-invasive testing      Patients Past medical history reviewed and listed below  Past Medical History:   Diagnosis Date    Allergic rhinitis     Cancer ([facility] [address]     breast    GI bleed     Upper GI Bleed with knee surgery     Glaucoma     bilateral    Hyperlipidemia     Hypertension     Myelodysplastic syndrome ([facility] [address]     Radiation therapy complication 5275    UTI (lower urinary tract infection)      Past Surgical History:   Procedure Laterality Date    BREAST SURGERY      lumpectomy right    CATARACT REMOVAL WITH IMPLANT      bilateral    HYSTERECTOMY      age 34    KNEE SURGERY  2012    left side       I have reviewed the following from the nursing documentation. History reviewed. No pertinent family history. Social History     Socioeconomic History    Marital status:      Spouse name: Not on file    Number of children: Not on file    Years of education: Not on file    Highest education level: Not on file   Occupational History    Not on file   Tobacco Use    Smoking status: Former Smoker     Quit date: 1997     Years since quittin.9    Smokeless tobacco: Never Used   Vaping Use    Vaping Use: Never used   Substance and Sexual Activity    Alcohol use: No    Drug use: No    Sexual activity: Not on file   Other Topics Concern    Not on file   Social History Narrative    Not on file     Social Determinants of Health     Financial Resource Strain:     Difficulty of Paying Living Expenses: Not on file   Food Insecurity:     Worried About Running Out of Food in the Last Year: Not on file    Lidia of Food in the Last Year: Not on file   Transportation Needs:     Lack of Transportation (Medical): Not on file    Lack of Transportation (Non-Medical):  Not on file   Physical Activity:     Days of Exercise per Week: Not on file    Minutes of Exercise per Session: Not on file   Stress:     Feeling of Stress : Not on file   Social Connections:     Frequency of Communication with Friends and Family: Not on file    Frequency of Social Gatherings with Friends and Family: Not on file    Attends Faith Services: Not on file    Active Member of Clubs or Organizations: Not on file    Attends Club or Organization Meetings: Not on file    Marital Status: Not on file   Intimate Partner Violence:     Fear of Current or Ex-Partner: Not on file    Emotionally Abused: Not on file    Physically Abused: Not on file    Sexually Abused: Not on file   Housing Stability:     Unable to Pay for Housing in the Last Year: Not on file    Number of [place] in the Last Year: Not on file    Unstable Housing in the Last Year: Not on file     Current Facility-Administered Medications   Medication Dose Route Frequency Provider Last Rate Last Admin    0.9 % sodium chloride bolus  1,000 mL IntraVENous Once Nisha Soria MD 1,000 mL/hr at 02/02/22 2240 1,000 mL at 02/02/22 2240     Current Outpatient Medications   Medication Sig Dispense Refill    famotidine (PEPCID) 20 MG tablet TAKE ONE TABLET BY MOUTH TWICE A DAY AS NEEDED FOR HEARTBURN      sertraline (ZOLOFT) 50 MG tablet TAKE ONE TABLET BY MOUTH DAILY      hydroxyurea (HYDREA) 500 MG chemo capsule Take by mouth daily      allopurinol (ZYLOPRIM) 300 MG tablet Take 300 mg by mouth daily      metoprolol tartrate (LOPRESSOR) 25 MG tablet Take 25 mg by mouth 2 times daily      apixaban (ELIQUIS) 5 MG TABS tablet Take by mouth 2 times daily      acetaminophen (TYLENOL) 500 MG tablet Take 500 mg by mouth every 6 hours as needed for Pain      Brinzolamide-Brimonidine (SIMBRINZA) 1-0.2 % SUSP Apply to eye       Bimatoprost (LUMIGAN) 0.01 % SOLN Apply 1 drop to eye nightly Each eye        Allergies   Allergen Reactions    Oxycodone        REVIEW OF SYSTEMS  10 systems reviewed, pertinent positives per HPI otherwise noted to be negative     PHYSICAL EXAM  BP (!) 158/83   Pulse 85   Temp 98.2 °F (36.8 °C) (Oral)   Resp 18   Ht 5' 1\" (1.549 m)   Wt 130 lb (59 kg)   SpO2 100%   BMI 24.56 kg/m²   GENERAL APPEARANCE: Awake and alert. Cooperative.  In no obvious distress. HEAD: Normocephalic. Atraumatic. EYES: PERRL. EOM's grossly intact. ENT: Mucous membranes are pink and moist.   NECK: Supple. HEART: RRR.  3/6 holosystolic murmurs. There is no chest wall tenderness to palpation  LUNGS: Respirations unlabored. CTAB. Good air exchange. ABDOMEN: Soft. Non-distended. Non-tender. No masses. No organomegaly. No guarding or rebound. EXTREMITIES: No peripheral edema. Moves all extremities equally. All extremities neurovascularly intact. SKIN: Warm and dry. No acute rashes. NEUROLOGICAL: Alert and oriented. Strength 5/5, sensation intact. Gait normal.   PSYCHIATRIC: Normal mood and affect. No HI or SI expressed to me. RADIOLOGY    If acquired see below     EKG:     If acquired see below       ED COURSE/MDM    The patient stuck around for almost 3 hours for her series of tests which included a chest x-ray and EKG and cardiac markers. While she was here she had a couple bouts of this pain she describes the pain as being more muscle like a muscle spasm-like that gets progressively worse progressively worse until it relieves itself spontaneously. It never lasts more than 5minutes. She says that usually at night and and father further questioning with the patient it sounds like she usually eats about 1 hour before she goes to bed which I explained to her might be a contributing cause to GERD and GERD may be a contributing cause to esophageal spasm which is what I think this patient has. So we had some shared decision making regarding the test results as well as algorithmic HEART score and she feels comfortable going home and seeing her doctor who she has an appointment with on Friday for further testing. She understood very clearly that she needed to get further testing to find out for sure if this was her heart or not I felt the same way despite her heart score being 4 that still put her in the relatively low to moderate risk profile.   She was also advised that if she had pain that lasted more than 5 minutes and lasted a longer time that she should come back to the emergency department she was also told that if it felt different she should come back into the emergency department as well. So at this point with shared decision making the patient is going to go home with her daughter. I am going to place her on a low level calcium channel blocker to see if that helps with her symptoms. Also can give her a PPI for the GERD which may be the causative factor here. She can then talk to her doctor on Friday if she he wants to continue her on these medications. ED Course as of 02/03/22 0145 Wed Feb 02, 2022   2349 CBC Auto Differential(!):    WBC 15.2(!)   RBC 2.26(!)   Hemoglobin Quant 8.4(!)   Hematocrit 26.2(!)   .1(!)   MCH 37.2(!)   MCHC 32.0   RDW 23.7(!)   Platelet Count 950(!)   MPV 7.9   Neutrophils % 87.7   Lymphocyte % 7.4   Monocytes % 3.8   Eosinophils % 0.8   Basophils % 0.3   Neutrophils Absolute 13.3(!)   Lymphocytes Absolute 1.1   Monocytes Absolute 0.6   Eosinophils Absolute 0.1   Basophils Absolute 0.0  CBC revealed a white count of 15.2 and H&H of 8 and 26 with an MCV of 116 reflecting a macrocytic anemia. Her platelet her platelet count was 469. All of her lines are down probably secondary to her hydroxyurea [DL]   2349 Comprehensive Metabolic Panel w/ Reflex to MG(!):    Sodium 136   Potassium 4.1   Chloride 99   CO2 23   Anion Gap 14   Glucose 206(!)   BUN 25(!)   Creatinine 0.8   GFR Non- >60   GFR  >60   CALCIUM, SERUM, 050000 9.3   Total Protein 6.7   Albumin 4.4   Albumin/Globulin Ratio 1.9   Bilirubin 0.5   Alk Phos 119   ALT 7(!)   AST 7(!)  Comprehensive metabolic panel was normal except for glucose of 206 her BUN was 25 creatinine was 0.8 with a resultant GFR greater than 60 [DL]   2350 XR CHEST PORTABLE  FINDINGS:  The lungs are without acute focal process.   There is no effusion or  pneumothorax. The cardiomediastinal silhouette is stable. The osseous  structures are stable. Bilateral glenohumeral joint osteoarthritis.     IMPRESSION:  No acute process. [DL]   Thu Feb 03, 2022   0019 Troponin:    Troponin <0.01  Troponin #1 at 2232 was less than 0.01 [DL]   0125 Troponin:    Troponin <0.01  Second troponin was less than 0.01 [DL]      ED Course User Index  [DL] Pauline Cardenas MD       The ED course and plan were reviewed and results discussed with the patient and daughter at length    The patient understood and agreed with the Discharge/transfer planning.     CLINICAL IMPRESSION and DISPOSITION    Da Burger was stable and diagnosed with chest pain-esophageal spasm    Patient was treated with nitroglycerin, PPI, GI cocktail, aspirin       Pauline Cardenas MD  02/03/22 6619

## 2022-02-03 NOTE — ED NOTES
Repeat dose of nitro given at this per verbal order from Dr. Jj Hernandez after pt stating pressure in chest is going up again.      Marilia Singleton RN  02/03/22 9340

## 2022-02-03 NOTE — ED NOTES
Additional medications given at this time. Pt also given warm blanket.      Francisca Garrett RN  02/02/22 9648

## 2022-02-03 NOTE — ED NOTES
Nitroglycerine given at this time. Pt stating her pain is to the midsternal region that radiates to the back. States pain of 10/10.      Bonnie Dailey RN  02/02/22 5465

## 2022-02-09 ENCOUNTER — HOSPITAL ENCOUNTER (INPATIENT)
Age: 82
LOS: 6 days | Discharge: HOME HEALTH | DRG: 246 | End: 2022-02-15
Attending: INTERNAL MEDICINE | Admitting: INTERNAL MEDICINE
Payer: MEDICARE

## 2022-02-09 ENCOUNTER — APPOINTMENT (OUTPATIENT)
Dept: CT IMAGING | Age: 82
End: 2022-02-09
Payer: MEDICARE

## 2022-02-09 ENCOUNTER — APPOINTMENT (OUTPATIENT)
Dept: CT IMAGING | Age: 82
DRG: 246 | End: 2022-02-09
Attending: INTERNAL MEDICINE
Payer: MEDICARE

## 2022-02-09 ENCOUNTER — HOSPITAL ENCOUNTER (EMERGENCY)
Age: 82
Discharge: ANOTHER HOSPITAL | End: 2022-02-09
Attending: EMERGENCY MEDICINE
Payer: MEDICARE

## 2022-02-09 ENCOUNTER — APPOINTMENT (OUTPATIENT)
Dept: GENERAL RADIOLOGY | Age: 82
End: 2022-02-09
Payer: MEDICARE

## 2022-02-09 VITALS
SYSTOLIC BLOOD PRESSURE: 130 MMHG | DIASTOLIC BLOOD PRESSURE: 78 MMHG | TEMPERATURE: 98.3 F | RESPIRATION RATE: 24 BRPM | BODY MASS INDEX: 24.55 KG/M2 | WEIGHT: 130 LBS | OXYGEN SATURATION: 97 % | HEIGHT: 61 IN | HEART RATE: 103 BPM

## 2022-02-09 DIAGNOSIS — Z79.01 ANTICOAGULATED BY ANTICOAGULATION TREATMENT: ICD-10-CM

## 2022-02-09 DIAGNOSIS — R93.5 ABNORMAL CT OF THE ABDOMEN: ICD-10-CM

## 2022-02-09 DIAGNOSIS — D61.818 PANCYTOPENIA (HCC): ICD-10-CM

## 2022-02-09 DIAGNOSIS — R10.13 ABDOMINAL PAIN, EPIGASTRIC: ICD-10-CM

## 2022-02-09 DIAGNOSIS — I50.20 SYSTOLIC CONGESTIVE HEART FAILURE, UNSPECIFIED HF CHRONICITY (HCC): ICD-10-CM

## 2022-02-09 DIAGNOSIS — D50.0 IRON DEFICIENCY ANEMIA DUE TO CHRONIC BLOOD LOSS: Primary | ICD-10-CM

## 2022-02-09 DIAGNOSIS — I48.91 ATRIAL FIBRILLATION, UNSPECIFIED TYPE (HCC): ICD-10-CM

## 2022-02-09 DIAGNOSIS — D46.9 MDS (MYELODYSPLASTIC SYNDROME) (HCC): ICD-10-CM

## 2022-02-09 PROBLEM — D64.9 ANEMIA: Status: ACTIVE | Noted: 2022-02-09

## 2022-02-09 LAB
A/G RATIO: 2 (ref 1.1–2.2)
ABO/RH: NORMAL
ALBUMIN SERPL-MCNC: 4.1 G/DL (ref 3.4–5)
ALP BLD-CCNC: 97 U/L (ref 40–129)
ALT SERPL-CCNC: 6 U/L (ref 10–40)
ANION GAP SERPL CALCULATED.3IONS-SCNC: 10 MMOL/L (ref 3–16)
ANISOCYTOSIS: ABNORMAL
ANTIBODY SCREEN: NORMAL
AST SERPL-CCNC: 6 U/L (ref 15–37)
BACTERIA: ABNORMAL /HPF
BASOPHILS ABSOLUTE: 0 K/UL (ref 0–0.2)
BASOPHILS RELATIVE PERCENT: 0.1 %
BILIRUB SERPL-MCNC: 0.8 MG/DL (ref 0–1)
BILIRUBIN URINE: NEGATIVE
BLOOD BANK DISPENSE STATUS: NORMAL
BLOOD BANK PRODUCT CODE: NORMAL
BLOOD SMEAR REVIEW: NORMAL
BLOOD, URINE: ABNORMAL
BPU ID: NORMAL
BUN BLDV-MCNC: 30 MG/DL (ref 7–20)
CALCIUM SERPL-MCNC: 9 MG/DL (ref 8.3–10.6)
CHLORIDE BLD-SCNC: 102 MMOL/L (ref 99–110)
CLARITY: CLEAR
CO2: 23 MMOL/L (ref 21–32)
COLOR: YELLOW
CREAT SERPL-MCNC: 0.7 MG/DL (ref 0.6–1.2)
DESCRIPTION BLOOD BANK: NORMAL
EKG ATRIAL RATE: 93 BPM
EKG DIAGNOSIS: NORMAL
EKG P AXIS: 24 DEGREES
EKG P-R INTERVAL: 162 MS
EKG Q-T INTERVAL: 382 MS
EKG QRS DURATION: 92 MS
EKG QTC CALCULATION (BAZETT): 474 MS
EKG R AXIS: 21 DEGREES
EKG T AXIS: -10 DEGREES
EKG VENTRICULAR RATE: 93 BPM
EOSINOPHILS ABSOLUTE: 0 K/UL (ref 0–0.6)
EOSINOPHILS RELATIVE PERCENT: 0.3 %
EPITHELIAL CELLS, UA: ABNORMAL /HPF (ref 0–5)
GFR AFRICAN AMERICAN: >60
GFR NON-AFRICAN AMERICAN: >60
GLUCOSE BLD-MCNC: 184 MG/DL (ref 70–99)
GLUCOSE URINE: NEGATIVE MG/DL
HCT VFR BLD CALC: 16.7 % (ref 36–48)
HCT VFR BLD CALC: 22.7 % (ref 36–48)
HCT VFR BLD CALC: 22.7 % (ref 36–48)
HEMOGLOBIN: 5.5 G/DL (ref 12–16)
HEMOGLOBIN: 7.3 G/DL (ref 12–16)
HEMOGLOBIN: 7.5 G/DL (ref 12–16)
KETONES, URINE: ABNORMAL MG/DL
LACTIC ACID: 1.7 MMOL/L (ref 0.4–2)
LEUKOCYTE ESTERASE, URINE: ABNORMAL
LIPASE: 16 U/L (ref 13–60)
LYMPHOCYTES ABSOLUTE: 0.3 K/UL (ref 1–5.1)
LYMPHOCYTES RELATIVE PERCENT: 4.4 %
MCH RBC QN AUTO: 38 PG (ref 26–34)
MCHC RBC AUTO-ENTMCNC: 32.8 G/DL (ref 31–36)
MCV RBC AUTO: 115.9 FL (ref 80–100)
MICROSCOPIC EXAMINATION: YES
MONOCYTES ABSOLUTE: 0.2 K/UL (ref 0–1.3)
MONOCYTES RELATIVE PERCENT: 2.1 %
NEUTROPHILS ABSOLUTE: 7 K/UL (ref 1.7–7.7)
NEUTROPHILS RELATIVE PERCENT: 93.1 %
NITRITE, URINE: POSITIVE
OCCULT BLOOD DIAGNOSTIC: NORMAL
PDW BLD-RTO: 23.1 % (ref 12.4–15.4)
PH UA: 5 (ref 5–8)
PLATELET # BLD: 59 K/UL (ref 135–450)
PLATELET SLIDE REVIEW: ABNORMAL
PMV BLD AUTO: 7.7 FL (ref 5–10.5)
POTASSIUM REFLEX MAGNESIUM: 4.3 MMOL/L (ref 3.5–5.1)
PRO-BNP: 3999 PG/ML (ref 0–449)
PROTEIN UA: NEGATIVE MG/DL
RBC # BLD: 1.44 M/UL (ref 4–5.2)
RBC UA: ABNORMAL /HPF (ref 0–4)
SARS-COV-2, NAAT: NOT DETECTED
SARS-COV-2, NAAT: NOT DETECTED
SLIDE REVIEW: ABNORMAL
SODIUM BLD-SCNC: 135 MMOL/L (ref 136–145)
SPECIFIC GRAVITY UA: 1.01 (ref 1–1.03)
TOTAL PROTEIN: 6.2 G/DL (ref 6.4–8.2)
TROPONIN: 0.04 NG/ML
TROPONIN: 0.78 NG/ML
URINE TYPE: ABNORMAL
UROBILINOGEN, URINE: 0.2 E.U./DL
WBC # BLD: 7.6 K/UL (ref 4–11)
WBC UA: ABNORMAL /HPF (ref 0–5)

## 2022-02-09 PROCEDURE — 93005 ELECTROCARDIOGRAM TRACING: CPT | Performed by: EMERGENCY MEDICINE

## 2022-02-09 PROCEDURE — 85045 AUTOMATED RETICULOCYTE COUNT: CPT

## 2022-02-09 PROCEDURE — 83880 ASSAY OF NATRIURETIC PEPTIDE: CPT

## 2022-02-09 PROCEDURE — 6370000000 HC RX 637 (ALT 250 FOR IP): Performed by: EMERGENCY MEDICINE

## 2022-02-09 PROCEDURE — 86923 COMPATIBILITY TEST ELECTRIC: CPT

## 2022-02-09 PROCEDURE — 6360000002 HC RX W HCPCS: Performed by: STUDENT IN AN ORGANIZED HEALTH CARE EDUCATION/TRAINING PROGRAM

## 2022-02-09 PROCEDURE — P9016 RBC LEUKOCYTES REDUCED: HCPCS

## 2022-02-09 PROCEDURE — G0328 FECAL BLOOD SCRN IMMUNOASSAY: HCPCS

## 2022-02-09 PROCEDURE — 80061 LIPID PANEL: CPT

## 2022-02-09 PROCEDURE — 6360000004 HC RX CONTRAST MEDICATION: Performed by: PHYSICIAN ASSISTANT

## 2022-02-09 PROCEDURE — 96374 THER/PROPH/DIAG INJ IV PUSH: CPT

## 2022-02-09 PROCEDURE — 6360000002 HC RX W HCPCS: Performed by: EMERGENCY MEDICINE

## 2022-02-09 PROCEDURE — 86901 BLOOD TYPING SEROLOGIC RH(D): CPT

## 2022-02-09 PROCEDURE — 87635 SARS-COV-2 COVID-19 AMP PRB: CPT

## 2022-02-09 PROCEDURE — 93005 ELECTROCARDIOGRAM TRACING: CPT | Performed by: INTERNAL MEDICINE

## 2022-02-09 PROCEDURE — 96375 TX/PRO/DX INJ NEW DRUG ADDON: CPT

## 2022-02-09 PROCEDURE — 36415 COLL VENOUS BLD VENIPUNCTURE: CPT

## 2022-02-09 PROCEDURE — 36430 TRANSFUSION BLD/BLD COMPNT: CPT

## 2022-02-09 PROCEDURE — 82746 ASSAY OF FOLIC ACID SERUM: CPT

## 2022-02-09 PROCEDURE — 81001 URINALYSIS AUTO W/SCOPE: CPT

## 2022-02-09 PROCEDURE — 85018 HEMOGLOBIN: CPT

## 2022-02-09 PROCEDURE — 93010 ELECTROCARDIOGRAM REPORT: CPT | Performed by: INTERNAL MEDICINE

## 2022-02-09 PROCEDURE — 2580000003 HC RX 258: Performed by: EMERGENCY MEDICINE

## 2022-02-09 PROCEDURE — 86850 RBC ANTIBODY SCREEN: CPT

## 2022-02-09 PROCEDURE — 2580000003 HC RX 258: Performed by: PHYSICIAN ASSISTANT

## 2022-02-09 PROCEDURE — 85027 COMPLETE CBC AUTOMATED: CPT

## 2022-02-09 PROCEDURE — 85014 HEMATOCRIT: CPT

## 2022-02-09 PROCEDURE — C9113 INJ PANTOPRAZOLE SODIUM, VIA: HCPCS | Performed by: STUDENT IN AN ORGANIZED HEALTH CARE EDUCATION/TRAINING PROGRAM

## 2022-02-09 PROCEDURE — 99285 EMERGENCY DEPT VISIT HI MDM: CPT

## 2022-02-09 PROCEDURE — 71275 CT ANGIOGRAPHY CHEST: CPT

## 2022-02-09 PROCEDURE — 84484 ASSAY OF TROPONIN QUANT: CPT

## 2022-02-09 PROCEDURE — 96372 THER/PROPH/DIAG INJ SC/IM: CPT

## 2022-02-09 PROCEDURE — 2060000000 HC ICU INTERMEDIATE R&B

## 2022-02-09 PROCEDURE — 6360000002 HC RX W HCPCS

## 2022-02-09 PROCEDURE — 2580000003 HC RX 258: Performed by: STUDENT IN AN ORGANIZED HEALTH CARE EDUCATION/TRAINING PROGRAM

## 2022-02-09 PROCEDURE — P9040 RBC LEUKOREDUCED IRRADIATED: HCPCS

## 2022-02-09 PROCEDURE — 6360000004 HC RX CONTRAST MEDICATION: Performed by: EMERGENCY MEDICINE

## 2022-02-09 PROCEDURE — 83605 ASSAY OF LACTIC ACID: CPT

## 2022-02-09 PROCEDURE — 74177 CT ABD & PELVIS W/CONTRAST: CPT

## 2022-02-09 PROCEDURE — 86900 BLOOD TYPING SEROLOGIC ABO: CPT

## 2022-02-09 PROCEDURE — 80053 COMPREHEN METABOLIC PANEL: CPT

## 2022-02-09 PROCEDURE — 83690 ASSAY OF LIPASE: CPT

## 2022-02-09 PROCEDURE — 6370000000 HC RX 637 (ALT 250 FOR IP)

## 2022-02-09 PROCEDURE — 85025 COMPLETE CBC W/AUTO DIFF WBC: CPT

## 2022-02-09 PROCEDURE — 82607 VITAMIN B-12: CPT

## 2022-02-09 PROCEDURE — 96376 TX/PRO/DX INJ SAME DRUG ADON: CPT

## 2022-02-09 PROCEDURE — 85730 THROMBOPLASTIN TIME PARTIAL: CPT

## 2022-02-09 PROCEDURE — 71045 X-RAY EXAM CHEST 1 VIEW: CPT

## 2022-02-09 RX ORDER — NITROGLYCERIN 0.4 MG/1
TABLET SUBLINGUAL
Status: COMPLETED
Start: 2022-02-09 | End: 2022-02-09

## 2022-02-09 RX ORDER — HEPARIN SODIUM 1000 [USP'U]/ML
60 INJECTION, SOLUTION INTRAVENOUS; SUBCUTANEOUS ONCE
Status: COMPLETED | OUTPATIENT
Start: 2022-02-09 | End: 2022-02-10

## 2022-02-09 RX ORDER — HEPARIN SODIUM 1000 [USP'U]/ML
30 INJECTION, SOLUTION INTRAVENOUS; SUBCUTANEOUS PRN
Status: DISCONTINUED | OUTPATIENT
Start: 2022-02-09 | End: 2022-02-11

## 2022-02-09 RX ORDER — MORPHINE SULFATE 2 MG/ML
INJECTION, SOLUTION INTRAMUSCULAR; INTRAVENOUS
Status: COMPLETED
Start: 2022-02-09 | End: 2022-02-09

## 2022-02-09 RX ORDER — ONDANSETRON 2 MG/ML
4 INJECTION INTRAMUSCULAR; INTRAVENOUS EVERY 6 HOURS PRN
Status: DISCONTINUED | OUTPATIENT
Start: 2022-02-09 | End: 2022-02-15 | Stop reason: HOSPADM

## 2022-02-09 RX ORDER — DICYCLOMINE HYDROCHLORIDE 10 MG/1
10 CAPSULE ORAL ONCE
Status: COMPLETED | OUTPATIENT
Start: 2022-02-09 | End: 2022-02-09

## 2022-02-09 RX ORDER — ONDANSETRON 2 MG/ML
INJECTION INTRAMUSCULAR; INTRAVENOUS
Status: COMPLETED
Start: 2022-02-09 | End: 2022-02-09

## 2022-02-09 RX ORDER — NITROGLYCERIN 0.4 MG/1
0.4 TABLET SUBLINGUAL EVERY 5 MIN PRN
Status: DISCONTINUED | OUTPATIENT
Start: 2022-02-09 | End: 2022-02-09 | Stop reason: HOSPADM

## 2022-02-09 RX ORDER — SODIUM CHLORIDE 9 MG/ML
INJECTION, SOLUTION INTRAVENOUS PRN
Status: DISCONTINUED | OUTPATIENT
Start: 2022-02-09 | End: 2022-02-09 | Stop reason: HOSPADM

## 2022-02-09 RX ORDER — ASPIRIN 81 MG/1
81 TABLET, CHEWABLE ORAL DAILY
Status: DISCONTINUED | OUTPATIENT
Start: 2022-02-10 | End: 2022-02-11 | Stop reason: SDUPTHER

## 2022-02-09 RX ORDER — ACETAMINOPHEN 325 MG/1
650 TABLET ORAL EVERY 6 HOURS PRN
Status: DISCONTINUED | OUTPATIENT
Start: 2022-02-09 | End: 2022-02-15 | Stop reason: HOSPADM

## 2022-02-09 RX ORDER — SODIUM CHLORIDE 9 MG/ML
INJECTION, SOLUTION INTRAVENOUS CONTINUOUS
Status: ACTIVE | OUTPATIENT
Start: 2022-02-09 | End: 2022-02-10

## 2022-02-09 RX ORDER — DROPERIDOL 2.5 MG/ML
1.25 INJECTION, SOLUTION INTRAMUSCULAR; INTRAVENOUS EVERY 6 HOURS PRN
Status: DISCONTINUED | OUTPATIENT
Start: 2022-02-09 | End: 2022-02-09 | Stop reason: HOSPADM

## 2022-02-09 RX ORDER — SODIUM CHLORIDE 0.9 % (FLUSH) 0.9 %
5-40 SYRINGE (ML) INJECTION EVERY 12 HOURS SCHEDULED
Status: DISCONTINUED | OUTPATIENT
Start: 2022-02-09 | End: 2022-02-11 | Stop reason: SDUPTHER

## 2022-02-09 RX ORDER — ACETAMINOPHEN 650 MG/1
650 SUPPOSITORY RECTAL EVERY 6 HOURS PRN
Status: DISCONTINUED | OUTPATIENT
Start: 2022-02-09 | End: 2022-02-15 | Stop reason: HOSPADM

## 2022-02-09 RX ORDER — HEPARIN SODIUM 10000 [USP'U]/100ML
12 INJECTION, SOLUTION INTRAVENOUS CONTINUOUS
Status: DISCONTINUED | OUTPATIENT
Start: 2022-02-09 | End: 2022-02-11

## 2022-02-09 RX ORDER — POLYETHYLENE GLYCOL 3350 17 G/17G
17 POWDER, FOR SOLUTION ORAL DAILY PRN
Status: DISCONTINUED | OUTPATIENT
Start: 2022-02-09 | End: 2022-02-15 | Stop reason: HOSPADM

## 2022-02-09 RX ORDER — PANTOPRAZOLE SODIUM 40 MG/10ML
40 INJECTION, POWDER, LYOPHILIZED, FOR SOLUTION INTRAVENOUS 2 TIMES DAILY
Status: DISCONTINUED | OUTPATIENT
Start: 2022-02-09 | End: 2022-02-15 | Stop reason: HOSPADM

## 2022-02-09 RX ORDER — ONDANSETRON 2 MG/ML
4 INJECTION INTRAMUSCULAR; INTRAVENOUS ONCE
Status: COMPLETED | OUTPATIENT
Start: 2022-02-09 | End: 2022-02-09

## 2022-02-09 RX ORDER — 0.9 % SODIUM CHLORIDE 0.9 %
500 INTRAVENOUS SOLUTION INTRAVENOUS ONCE
Status: COMPLETED | OUTPATIENT
Start: 2022-02-09 | End: 2022-02-10

## 2022-02-09 RX ORDER — PROMETHAZINE HYDROCHLORIDE 25 MG/ML
6.25 INJECTION, SOLUTION INTRAMUSCULAR; INTRAVENOUS ONCE
Status: COMPLETED | OUTPATIENT
Start: 2022-02-09 | End: 2022-02-09

## 2022-02-09 RX ORDER — SODIUM CHLORIDE 9 MG/ML
25 INJECTION, SOLUTION INTRAVENOUS PRN
Status: DISCONTINUED | OUTPATIENT
Start: 2022-02-09 | End: 2022-02-11 | Stop reason: SDUPTHER

## 2022-02-09 RX ORDER — ONDANSETRON 4 MG/1
4 TABLET, ORALLY DISINTEGRATING ORAL ONCE
Status: COMPLETED | OUTPATIENT
Start: 2022-02-09 | End: 2022-02-09

## 2022-02-09 RX ORDER — ONDANSETRON 4 MG/1
4 TABLET, ORALLY DISINTEGRATING ORAL EVERY 8 HOURS PRN
Status: DISCONTINUED | OUTPATIENT
Start: 2022-02-09 | End: 2022-02-15 | Stop reason: HOSPADM

## 2022-02-09 RX ORDER — SODIUM CHLORIDE 0.9 % (FLUSH) 0.9 %
5-40 SYRINGE (ML) INJECTION EVERY 12 HOURS
Status: DISCONTINUED | OUTPATIENT
Start: 2022-02-09 | End: 2022-02-09 | Stop reason: HOSPADM

## 2022-02-09 RX ORDER — FUROSEMIDE 10 MG/ML
20 INJECTION INTRAMUSCULAR; INTRAVENOUS ONCE
Status: COMPLETED | OUTPATIENT
Start: 2022-02-09 | End: 2022-02-09

## 2022-02-09 RX ORDER — ASPIRIN 81 MG/1
324 TABLET, CHEWABLE ORAL ONCE
Status: DISCONTINUED | OUTPATIENT
Start: 2022-02-09 | End: 2022-02-11

## 2022-02-09 RX ORDER — 0.9 % SODIUM CHLORIDE 0.9 %
1000 INTRAVENOUS SOLUTION INTRAVENOUS ONCE
Status: COMPLETED | OUTPATIENT
Start: 2022-02-09 | End: 2022-02-09

## 2022-02-09 RX ORDER — SODIUM CHLORIDE 0.9 % (FLUSH) 0.9 %
5-40 SYRINGE (ML) INJECTION PRN
Status: DISCONTINUED | OUTPATIENT
Start: 2022-02-09 | End: 2022-02-15 | Stop reason: HOSPADM

## 2022-02-09 RX ORDER — MORPHINE SULFATE 2 MG/ML
2 INJECTION, SOLUTION INTRAMUSCULAR; INTRAVENOUS EVERY 30 MIN PRN
Status: COMPLETED | OUTPATIENT
Start: 2022-02-09 | End: 2022-02-10

## 2022-02-09 RX ORDER — HEPARIN SODIUM 1000 [USP'U]/ML
60 INJECTION, SOLUTION INTRAVENOUS; SUBCUTANEOUS PRN
Status: DISCONTINUED | OUTPATIENT
Start: 2022-02-09 | End: 2022-02-11

## 2022-02-09 RX ORDER — FENTANYL CITRATE 50 UG/ML
25 INJECTION, SOLUTION INTRAMUSCULAR; INTRAVENOUS ONCE
Status: COMPLETED | OUTPATIENT
Start: 2022-02-09 | End: 2022-02-09

## 2022-02-09 RX ORDER — SODIUM CHLORIDE 9 MG/ML
INJECTION, SOLUTION INTRAVENOUS PRN
Status: DISCONTINUED | OUTPATIENT
Start: 2022-02-09 | End: 2022-02-15 | Stop reason: HOSPADM

## 2022-02-09 RX ADMIN — ONDANSETRON HYDROCHLORIDE 4 MG: 2 INJECTION, SOLUTION INTRAMUSCULAR; INTRAVENOUS at 13:33

## 2022-02-09 RX ADMIN — DICYCLOMINE HYDROCHLORIDE 10 MG: 10 CAPSULE ORAL at 02:23

## 2022-02-09 RX ADMIN — DICYCLOMINE HYDROCHLORIDE 10 MG: 10 CAPSULE ORAL at 04:10

## 2022-02-09 RX ADMIN — ONDANSETRON 4 MG: 2 INJECTION INTRAMUSCULAR; INTRAVENOUS at 07:29

## 2022-02-09 RX ADMIN — FUROSEMIDE 20 MG: 10 INJECTION, SOLUTION INTRAMUSCULAR; INTRAVENOUS at 09:41

## 2022-02-09 RX ADMIN — PROMETHAZINE HYDROCHLORIDE 6.25 MG: 25 INJECTION INTRAMUSCULAR; INTRAVENOUS at 05:36

## 2022-02-09 RX ADMIN — DROPERIDOL 1.25 MG: 2.5 INJECTION, SOLUTION INTRAMUSCULAR; INTRAVENOUS at 03:10

## 2022-02-09 RX ADMIN — ONDANSETRON 4 MG: 4 TABLET, ORALLY DISINTEGRATING ORAL at 02:23

## 2022-02-09 RX ADMIN — IOPAMIDOL 75 ML: 755 INJECTION, SOLUTION INTRAVENOUS at 06:22

## 2022-02-09 RX ADMIN — PANTOPRAZOLE SODIUM 40 MG: 40 INJECTION, POWDER, FOR SOLUTION INTRAVENOUS at 22:40

## 2022-02-09 RX ADMIN — NITROGLYCERIN 0.4 MG: 0.4 TABLET SUBLINGUAL at 02:18

## 2022-02-09 RX ADMIN — MORPHINE SULFATE 2 MG: 2 INJECTION, SOLUTION INTRAMUSCULAR; INTRAVENOUS at 21:30

## 2022-02-09 RX ADMIN — ONDANSETRON HYDROCHLORIDE 4 MG: 2 INJECTION, SOLUTION INTRAMUSCULAR; INTRAVENOUS at 07:29

## 2022-02-09 RX ADMIN — ONDANSETRON 4 MG: 2 INJECTION INTRAMUSCULAR; INTRAVENOUS at 18:50

## 2022-02-09 RX ADMIN — SODIUM CHLORIDE 500 ML: 900 INJECTION, SOLUTION INTRAVENOUS at 23:00

## 2022-02-09 RX ADMIN — SODIUM CHLORIDE: 9 INJECTION, SOLUTION INTRAVENOUS at 19:12

## 2022-02-09 RX ADMIN — LIDOCAINE HYDROCHLORIDE: 20 SOLUTION ORAL; TOPICAL at 04:10

## 2022-02-09 RX ADMIN — SODIUM CHLORIDE 1000 ML: 9 INJECTION, SOLUTION INTRAVENOUS at 05:35

## 2022-02-09 RX ADMIN — SODIUM CHLORIDE, PRESERVATIVE FREE 10 ML: 5 INJECTION INTRAVENOUS at 21:00

## 2022-02-09 RX ADMIN — IOPAMIDOL 80 ML: 755 INJECTION, SOLUTION INTRAVENOUS at 22:28

## 2022-02-09 RX ADMIN — FENTANYL CITRATE 25 MCG: 50 INJECTION INTRAMUSCULAR; INTRAVENOUS at 05:36

## 2022-02-09 RX ADMIN — ONDANSETRON 4 MG: 2 INJECTION INTRAMUSCULAR; INTRAVENOUS at 13:33

## 2022-02-09 RX ADMIN — HYDROMORPHONE HYDROCHLORIDE 0.5 MG: 1 INJECTION, SOLUTION INTRAMUSCULAR; INTRAVENOUS; SUBCUTANEOUS at 07:46

## 2022-02-09 RX ADMIN — HYDROMORPHONE HYDROCHLORIDE 0.5 MG: 1 INJECTION, SOLUTION INTRAMUSCULAR; INTRAVENOUS; SUBCUTANEOUS at 14:20

## 2022-02-09 ASSESSMENT — PAIN SCALES - GENERAL
PAINLEVEL_OUTOF10: 10
PAINLEVEL_OUTOF10: 7
PAINLEVEL_OUTOF10: 8

## 2022-02-09 ASSESSMENT — ENCOUNTER SYMPTOMS
VOMITING: 0
DIARRHEA: 0
COLOR CHANGE: 0
SORE THROAT: 0
CONSTIPATION: 0
WHEEZING: 0
ABDOMINAL PAIN: 0
CHEST TIGHTNESS: 0
ABDOMINAL DISTENTION: 0
BLOOD IN STOOL: 0
RHINORRHEA: 0
COUGH: 0
SHORTNESS OF BREATH: 0
NAUSEA: 0

## 2022-02-09 ASSESSMENT — PAIN DESCRIPTION - LOCATION: LOCATION: CHEST

## 2022-02-09 ASSESSMENT — PAIN DESCRIPTION - PROGRESSION: CLINICAL_PROGRESSION: NOT CHANGED

## 2022-02-09 ASSESSMENT — PAIN DESCRIPTION - ONSET: ONSET: ON-GOING

## 2022-02-09 ASSESSMENT — PAIN DESCRIPTION - FREQUENCY
FREQUENCY: INTERMITTENT
FREQUENCY: CONTINUOUS

## 2022-02-09 ASSESSMENT — PAIN DESCRIPTION - ORIENTATION: ORIENTATION: MID

## 2022-02-09 ASSESSMENT — PAIN DESCRIPTION - DESCRIPTORS: DESCRIPTORS: CRAMPING;PRESSURE

## 2022-02-09 ASSESSMENT — PAIN DESCRIPTION - PAIN TYPE
TYPE: ACUTE PAIN
TYPE: ACUTE PAIN

## 2022-02-09 ASSESSMENT — PAIN - FUNCTIONAL ASSESSMENT: PAIN_FUNCTIONAL_ASSESSMENT: ACTIVITIES ARE NOT PREVENTED

## 2022-02-09 NOTE — ED PROVIDER NOTES
Emergency Department Attending Note    Ata Pineda MD    Date of ED VIsit: 2/9/2022    CHIEF COMPLAINT  GI Problem (Seen here on 2/2 with the same complaints of nausea, intermittant and vomiting. Saw her PCP today who agreed with diagnosis. Pt states pain has started today @ 4 pm. Midsternal pressure that builds and goes. Has vomited twice today. )      HISTORY OF PRESENT ILLNESS  Alroy Cowden is a 78 y.o. female  With Vital signs of /65   Pulse 98   Resp 15   Ht 5' 1\" (1.549 m)   Wt 130 lb (59 kg)   SpO2 94%   BMI 24.56 kg/m²  who presents to the ED with a complaint of sharp-like esophageal pain now associated with some lower GI upset that is causing nausea. Patient denies any cardiac pain. She has been doing okay at home since I saw her last.  I did place her on a calcium channel blocker but that apparently dropped her blood pressure too low so her doctor took her off of that and since then she has had repeat symptoms which are identical to what she had before and that is a spasm-like pain up into the throat through the chest mostly occurring at night. Relieved by nitroglycerin. We gave her nitroglycerin here in the emergency department when she was having the pain and that relieved the pain immediately. . Patient seen and evaluated in room 3  No other complaints, modifying factors or associated symptoms.     Patients Past medical history reviewed and listed below  Past Medical History:   Diagnosis Date    Allergic rhinitis     Cancer ([address]     breast    GI bleed     Upper GI Bleed with knee surgery 2012    Glaucoma     bilateral    Hyperlipidemia     Hypertension     Myelodysplastic syndrome ([address]     Radiation therapy complication 5743    UTI (lower urinary tract infection)      Past Surgical History:   Procedure Laterality Date    BREAST SURGERY  1999    lumpectomy right    CATARACT REMOVAL WITH IMPLANT      bilateral    HYSTERECTOMY      age 34    KNEE SURGERY  2/6/2012 left side       I have reviewed the following from the nursing documentation. History reviewed. No pertinent family history. Social History     Socioeconomic History    Marital status:      Spouse name: Not on file    Number of children: Not on file    Years of education: Not on file    Highest education level: Not on file   Occupational History    Not on file   Tobacco Use    Smoking status: Former Smoker     Quit date: 1997     Years since quittin.9    Smokeless tobacco: Never Used   Vaping Use    Vaping Use: Never used   Substance and Sexual Activity    Alcohol use: No    Drug use: No    Sexual activity: Not on file   Other Topics Concern    Not on file   Social History Narrative    Not on file     Social Determinants of Health     Financial Resource Strain:     Difficulty of Paying Living Expenses: Not on file   Food Insecurity:     Worried About Running Out of Food in the Last Year: Not on file    Lidia of Food in the Last Year: Not on file   Transportation Needs:     Lack of Transportation (Medical): Not on file    Lack of Transportation (Non-Medical):  Not on file   Physical Activity:     Days of Exercise per Week: Not on file    Minutes of Exercise per Session: Not on file   Stress:     Feeling of Stress : Not on file   Social Connections:     Frequency of Communication with Friends and Family: Not on file    Frequency of Social Gatherings with Friends and Family: Not on file    Attends Congregational Services: Not on file    Active Member of Clubs or Organizations: Not on file    Attends Club or Organization Meetings: Not on file    Marital Status: Not on file   Intimate Partner Violence:     Fear of Current or Ex-Partner: Not on file    Emotionally Abused: Not on file    Physically Abused: Not on file    Sexually Abused: Not on file   Housing Stability:     Unable to Pay for Housing in the Last Year: Not on file    Number of [place] in the Last Year: Not on file    Unstable Housing in the Last Year: Not on file     Current Facility-Administered Medications   Medication Dose Route Frequency Provider Last Rate Last Admin    nitroGLYCERIN (NITROSTAT) SL tablet 0.4 mg  0.4 mg SubLINGual Q5 Min PRN Irma Griffiths MD   0.4 mg at 02/09/22 0218     Current Outpatient Medications   Medication Sig Dispense Refill    NIFEdipine (PROCARDIA XL) 30 MG extended release tablet Take 1 tablet by mouth daily for 7 doses 7 tablet 0    famotidine (PEPCID) 20 MG tablet TAKE ONE TABLET BY MOUTH TWICE A DAY AS NEEDED FOR HEARTBURN      sertraline (ZOLOFT) 50 MG tablet TAKE ONE TABLET BY MOUTH DAILY      hydroxyurea (HYDREA) 500 MG chemo capsule Take by mouth daily      allopurinol (ZYLOPRIM) 300 MG tablet Take 300 mg by mouth daily      metoprolol tartrate (LOPRESSOR) 25 MG tablet Take 25 mg by mouth 2 times daily      apixaban (ELIQUIS) 5 MG TABS tablet Take by mouth 2 times daily      acetaminophen (TYLENOL) 500 MG tablet Take 500 mg by mouth every 6 hours as needed for Pain      Brinzolamide-Brimonidine (SIMBRINZA) 1-0.2 % SUSP Apply to eye       Bimatoprost (LUMIGAN) 0.01 % SOLN Apply 1 drop to eye nightly Each eye        Allergies   Allergen Reactions    Oxycodone        REVIEW OF SYSTEMS  10 systems reviewed, pertinent positives per HPI otherwise noted to be negative     PHYSICAL EXAM  /65   Pulse 98   Resp 15   Ht 5' 1\" (1.549 m)   Wt 130 lb (59 kg)   SpO2 94%   BMI 24.56 kg/m²   GENERAL APPEARANCE: Awake and alert. Cooperative. In mild to moderate distress. HEAD: Normocephalic. Atraumatic. EYES: PERRL. EOM's grossly intact. ENT: Mucous membranes are pink and moist.   NECK: Supple. HEART: RRR. No murmurs. LUNGS: Respirations unlabored. CTAB. Good air exchange. ABDOMEN: Soft. Non-distended. Non-tender. No masses. No organomegaly. No guarding or rebound. EXTREMITIES: No peripheral edema. Moves all extremities equally.  All extremities neurovascularly intact. SKIN: Warm and dry. No acute rashes. NEUROLOGICAL: Alert and oriented. Strength 5/5, sensation intact. Gait normal.   PSYCHIATRIC: Normal mood and affect. No HI or SI expressed to me. RADIOLOGY    If acquired see below     EKG:     If acquired see below       ED COURSE/MDM    It took a while to figure out what was going on with this patient since she was complaining of upper chest pain management seen previously and discharged with a diagnosis of esophageal spasm. It turns out that now that we have noticed a trend that her hydroxyurea is probably the cause of all of her lines blood lines to be decreased including a hemoglobin of 5.5. So at this point the patient was given a unit of blood and will be transferred to [facility] oncology floor. ED Course as of 02/12/22 1842 Wed Feb 09, 2022 0249 EKG: Indication: Chest pain, it shows a rhythm of normal sinus rhythm with PACs at a rate of 93, with no acute ST-Twave changes to indicate ischemia. Intervals are normal and the axis is normal. This  interpreted by me without a cardiologist.    [DL]   3750 Tried nitroglycerin when she had initial onset of pain and that relieved her pain and immediately. But it dropped her blood pressure into the 80s which limits her use of the nitroglycerin. [DL]   6020 We tried Bentyl as well as Zofran and droperidol for her nausea and vomiting.   None of them seem to work so now get a start aligned do a quick reassessment of the patient's belly pain and for some fentanyl and Phenergan [DL]   0536 CBC Auto Differential(!!):    WBC 7.6   RBC 1.44(!)   Hemoglobin Quant 5.5(!!)   Hematocrit 16.7(!!)   .9(!)   MCH 38.0(!)   MCHC 32.8   RDW 23.1(!)   MPV 7.7   Neutrophils % 93.1   Lymphocyte % 4.4   Monocytes % 2.1   Eosinophils % 0.3   Basophils % 0.1   Neutrophils Absolute 7.0   Lymphocytes Absolute 0.3(!)   Monocytes Absolute 0.2   Eosinophils Absolute 0.0   Basophils Absolute 0.0  CBC reveals a white count of 7.6 with an H&H of 5.5 and 16.7 platelet count of 59 [DL]   0553 Lactic Acid, Plasma:    Lactic Acid 1.7  Lactate 1.7 [DL]   0557 Blood occult stool #1:    Occult Blood Diagnostic Result: Negative  Normal range: Negative    Blood occult stool negative [DL]   0620 Turns out and reviewed that the all 3 lines at this patient's blood cell counts are down specifically her hemoglobin is down 3 points within 1week. Her white blood cell count is down to one half in 1week. And her platelets are down to 59 from the week previous when it was 112 [DL]   0631 Comprehensive Metabolic Panel w/ Reflex to MG(!):    Sodium 135(!)   Potassium 4.3   Chloride 102   CO2 23   Anion Gap 10   Glucose 184(!)   BUN 30(!)   Creatinine 0.7   GFR Non- >60   GFR  >60   CALCIUM, SERUM, 327892 9.0   Total Protein 6.2(!)   Albumin 4.1   Albumin/Globulin Ratio 2.0   Bilirubin 0.8   Alk Phos 97   ALT 6(!)   AST 6(!)  CBC reveals a sodium of 135 a glucose of 184 BUN of 30 with a GFR greater than 60 [DL]      ED Course User Index  [DL] Humaira Encinas MD       The ED course and plan were reviewed and results discussed with the patient    The patient understood and agreed with the Discharge/transfer planning. 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 Eli Cleary after my departure    CRITICAL CARE TIME:   CRITICAL CARE NOTE:    I spent 75 minutes on the critical care of the patient since this illness of acute anemia acutely impaired one or more vital organ systems such that there was a high probability of imminent or life threatening deterioration of the patient's condition. This time includes discussion regarding the patients condition with paramedics, nurses, consultants and the family.  It also includes  a review of computer record data, interpretation of all test results and time spent charting. It does not include time spent on separately reported billable procedures.                        Nisha Soria MD  02/12/22 4807

## 2022-02-09 NOTE — ED NOTES
Dr. Derek Suárez is speaking with Dr. Dilcia Diaz.       [facility] Wild  02/09/22 1002

## 2022-02-09 NOTE — ED NOTES
Called the transfer center to initiate a page to Dr. Lalit Cho with [facility]. Spoke with someone who identified themselves as an , when given the request for consult, she stated she would have to transfer this to one of the nurses.       José Miguel Layne  02/09/22 3348

## 2022-02-09 NOTE — ED NOTES
Kari 60 phoned with pt info given. Pt resting with OU closed, resps even and unlab.  Daughter remains at [address] [place] [address], RN  02/09/22 1007

## 2022-02-09 NOTE — ED NOTES
Spoke with Meagan Salazar who is paging Dr. Kassidy Alvarenga with [facility] to see about admission to the [facility].       Yung Clotilde  02/09/22 7717

## 2022-02-09 NOTE — H&P
Internal Medicine  PGY 1  History & Physical      CC N/V, CP    History Obtained From:  patient, family member - daughter    HISTORY OF PRESENT ILLNESS:  Ms. Yolie Soto is an 78year old female with a PMH of JONATHAN 2/2 chronic blood loss, MDS, systolic CHF, Afib on Eliquis, GIB, glaucoma, HTN who presents as a direct transfer from [facility] initially presenting with N/V, CP and abdominal pain. She states her CP, which radiates to between her shoulder blades, started about a week ago and was followed yesterday by nausea with predominant retching that is relieved by vomiting. She states she quit smoking about 20 years ago and that she rarely drinks and denies using narcotics or other drugs. She states that about a year ago she was seen in a hospital in [place] while visiting primarily due to respiratory distress and during that 7-8 day visit, she was diagnosed with porcelain gallbladder, and a \"heart infection\" though she does not know the details. In the [facility] ED, the patient was noted to be hypoxic to 86-88% on RA and to have 10/10 epigastric pain and nausea; she was given antiemetics and dilaudid. CT read as heart failure, followed-up with a CXR, and BNP was 3,999; coarse rales on pulmonary auscultation; Tn 0.04, no acute ischemia on EKG.     Past Medical History:        Diagnosis Date    Allergic rhinitis     Cancer ([address]     breast    GI bleed     Upper GI Bleed with knee surgery 2012    Glaucoma     bilateral    Hyperlipidemia     Hypertension     Myelodysplastic syndrome ([address]     Radiation therapy complication 6231    UTI (lower urinary tract infection)        Past Surgical History:        Procedure Laterality Date    BREAST SURGERY  1999    lumpectomy right    CATARACT REMOVAL WITH IMPLANT      bilateral    HYSTERECTOMY      age 34    KNEE SURGERY  2/6/2012    left side       Medications Priorto Admission:    Medications Prior to Admission: famotidine (PEPCID) 20 MG tablet, TAKE ONE TABLET BY MOUTH TWICE A DAY AS NEEDED FOR HEARTBURN  sertraline (ZOLOFT) 50 MG tablet, TAKE ONE TABLET BY MOUTH DAILY  hydroxyurea (HYDREA) 500 MG chemo capsule, Take by mouth daily  allopurinol (ZYLOPRIM) 300 MG tablet, Take 300 mg by mouth daily  metoprolol tartrate (LOPRESSOR) 25 MG tablet, Take 25 mg by mouth 2 times daily  apixaban (ELIQUIS) 5 MG TABS tablet, Take by mouth 2 times daily  acetaminophen (TYLENOL) 500 MG tablet, Take 500 mg by mouth every 6 hours as needed for Pain  Brinzolamide-Brimonidine (SIMBRINZA) 1-0.2 % SUSP, Apply to eye   Bimatoprost (LUMIGAN) 0.01 % SOLN, Apply 1 drop to eye nightly Each eye   NIFEdipine (PROCARDIA XL) 30 MG extended release tablet, Take 1 tablet by mouth daily for 7 doses    Allergies:  Oxycodone    Social History:   · TOBACCO:   reports that she quit smoking about 24 years ago. She has never used smokeless tobacco.  · ETOH:   reports no history of alcohol use. · DRUGS : none reported  · Patient currently lives alone at home    Family History:   No family history on file. Review of Systems   Constitutional: Positive for chills and fatigue. Negative for appetite change and fever. HENT: Negative for congestion, hearing loss, rhinorrhea and sore throat. Eyes: Negative for visual disturbance. Respiratory: Negative for cough, chest tightness, shortness of breath and wheezing. Cardiovascular: Negative for chest pain, palpitations and leg swelling. Gastrointestinal: Negative for abdominal distention, abdominal pain, blood in stool, constipation, diarrhea, nausea and vomiting. Endocrine: Negative for polyuria. Genitourinary: Negative for difficulty urinating and dysuria. Musculoskeletal: Negative for arthralgias and myalgias. Skin: Negative for color change, pallor and rash. Neurological: Negative for dizziness, seizures, syncope, facial asymmetry, weakness, light-headedness and headaches.    Psychiatric/Behavioral: Negative for behavioral problems, confusion and hallucinations. ROS: A 10 point review of systems was conducted, significant findings as noted in HPI. Physical Exam  Constitutional:       General: She is not in acute distress. Appearance: Normal appearance. She is not ill-appearing, toxic-appearing or diaphoretic. HENT:      Head: Normocephalic and atraumatic. Right Ear: External ear normal.      Left Ear: External ear normal.      Nose: Nose normal. No congestion or rhinorrhea. Mouth/Throat:      Mouth: Mucous membranes are moist.      Pharynx: Oropharynx is clear. Eyes:      Extraocular Movements: Extraocular movements intact. Conjunctiva/sclera: Conjunctivae normal.      Pupils: Pupils are equal, round, and reactive to light. Neck:      Comments: JVD present  Cardiovascular:      Rate and Rhythm: Tachycardia present. Rhythm irregular. Pulses: Normal pulses. Heart sounds: No friction rub. No gallop. Pulmonary:      Effort: Pulmonary effort is normal. No respiratory distress. Breath sounds: Normal breath sounds. No wheezing or rales. Abdominal:      General: Abdomen is flat. Bowel sounds are normal. There is no distension. Palpations: Abdomen is soft. There is no mass. Tenderness: There is abdominal tenderness (RUQ and lower abd midline). There is no guarding or rebound. Hernia: No hernia is present. Musculoskeletal:         General: No swelling, tenderness, deformity or signs of injury. Normal range of motion. Cervical back: Normal range of motion and neck supple. Right lower leg: No edema. Left lower leg: No edema. Skin:     General: Skin is warm and dry. Capillary Refill: Capillary refill takes less than 2 seconds. Coloration: Skin is not jaundiced or pale. Neurological:      General: No focal deficit present. Mental Status: She is alert and oriented to person, place, and time. Mental status is at baseline.       Cranial Nerves: No cranial nerve mild concentric left ventricular hypertrophy. Normal left ventricular diastolic filling pressure. Mild anterior and posterior mitral annular calcification is present. Moderate mitral regurgitation. The appears to be a small calcified echogenic mass near LVOT, likely   represents degenerative valve disease, and is less likely a vegetation. Study was compared to 2014 echo and the LVOT mass was not present in 2014. Mild aortic regurgitation is present. Moderate tricuspid regurgitation. Normal systolic pulmonary artery pressure (SPAP) estimated at 32 mmHg (RA   pressure 3 mmHg). Mild pulmonic regurgitation present. 1/23/2014 55% EF, mild MR.        ASSESSMENT AND PLAN:  Ms. Khanh Gould is an 78year old female with a PMH of JONATHAN 2/2 chronic blood loss, MDS, systolic CHF, Afib on Eliquis, GIB, glaucoma, HLD and HTN who presents as a direct transfer from [facility] ED initially presenting with N/V, CP and abdominal pain. Chest pain  N/V  Esophageal strictures  Esophageal strictures revealed on prior recent hospitalization. Likely N/V, CP related to esophageal strictures. - Zofran PRN nausea  - IVF  - Telemetry    Afib  On Eliquis at home. - Holding Eliquis in setting of likely blood loss anemia  - SCDs    Macrocytic anemia  Likely blood loss anemia  Myelodysplastic syndrome (MDS)  May be a component of MDS, blood loss and/or vitamin deficiency to her anemia. Patient has a history of GIB and states she bleeds easily with trauma. Patient was given a unit of PRBCs in the OSH due to hgb of 5.5.  FOBT was negative at OSH on admission.  - Monitor H&H Q6H  - Transfuse for less than 7 hgb  - Peripheral blood smear  - Reticulocyte count  - Heme/onc consult, recs appreciated    Abdominal pain/cramping  Patient states her last BM was yesterday and was distressful.   - Glycolax PRN constipation    Pulmonary edema likely 2/2 CHF  As seen on CXR (2/9/22), unlikely to be PNA, patient denies chills/fever, no cough or leukocytosis. Patient appears euvolemic on exam. BNP 3,999. Last echo (9/30/21) with EF 55-60%. - Continue to monitor fluid status    Gastric ulcer  Porcelain gallbladder  EGD in 2012 showing gastric ulcer.  - Protonix 40 BID  - GI consult, recs appreciated    Troponinemia  Tn 0.04 at OSH.  Likely NSTEMI in setting of anemia.  - Repeat Tn now    Chronic issues:  HTN - holding home metoprolol in setting of normo/hypotension  HLD - lipid panel      Will discuss with attending physician Dr. Hayden Villafuerte MD.      Code Status: Full code  FEN: NPO for now; NS @75cc/h  PPX: SCDs  DISPO: HEIDI Ramos MD, PGY-1  2/9/2022,  6:03 PM

## 2022-02-09 NOTE — ED NOTES
Spoke with Macarena Bonner at the transfer center who advised that [facility] is not accepting patients at this time and is holding for [facility]. RN and MD notified.       John De La Torre  02/09/22 4197

## 2022-02-09 NOTE — ED NOTES
Blood transfusion completed with out incident or s/s adverse reaction.  Pt resting with OU closed, resps even and unlab     Jan Ayers RN  02/09/22 2956

## 2022-02-09 NOTE — PROGRESS NOTES
4 Eyes Admission Assessment     I agree as the admission nurse that 2 RN's have performed a thorough Head to Toe Skin Assessment on the patient. ALL assessment sites listed below have been assessed on admission. Areas assessed by both nurses: Gia Degree   [x]   Head, Face, and Ears   [x]   Shoulders, Back, and Chest  [x]   Arms, Elbows, and Hands   [x]   Coccyx, Sacrum, and Ischium  [x]   Legs, Feet, and Heels        Does the Patient have Skin Breakdown? No        Scattered bruising, scabbing on right foot/heel. Blanchable redness on coccyx.    Toni Prevention initiated:  Yes   Wound Care Orders initiated:  NA      [facility] nurse consulted for Pressure Injury (Stage 3,4, Unstageable, DTI, NWPT, and Complex wounds) or Toni score 18 or lower:  NA      Nurse 1 eSignature: Electronically signed by Alka Noland RN on 2/9/22 at 5:56 PM EST    **SHARE this note so that the co-signing nurse is able to place an eSignature**    Nurse 2 eSignature: Electronically signed by Yodit Kenyon RN on 2/9/22 at 6:06 PM EST

## 2022-02-09 NOTE — ED PROVIDER NOTES
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 She had a pulse ox of 86 to 88% on room air she was placed on 3 L per nasal cannula  Her lung sounds reveal few coarse rales but no wet sounding rales are appreciated  A troponin was performed which revealed a reading of 0.04 I do not see any acute ischemic injury pattern on her EKG      Past Medical History:   Diagnosis Date    Allergic rhinitis     Cancer ([address]     breast    GI bleed     Upper GI Bleed with knee surgery 2012    Glaucoma     bilateral    Hyperlipidemia     Hypertension     Myelodysplastic syndrome ([address]     Radiation therapy complication 9933    UTI (lower urinary tract infection)        Past Surgical History:   Procedure Laterality Date    BREAST SURGERY  1999    lumpectomy right    CATARACT REMOVAL WITH IMPLANT      bilateral    HYSTERECTOMY      age 34    KNEE SURGERY  2/6/2012    left side       History reviewed. No pertinent family history.     Social History     Socioeconomic History    Marital status:      Spouse name: Not on file    Number of children: Not on file    Years of education: Not on file    Highest education level: Not on file   Occupational History    Not on file   Tobacco Use    Smoking status: Former Smoker     Quit date: 1997     Years since quittin.9    Smokeless tobacco: Never Used   Vaping Use    Vaping Use: Never used   Substance and Sexual Activity    Alcohol use: No    Drug use: No    Sexual activity: Not on file   Other Topics Concern    Not on file   Social History Narrative    Not on file     Social Determinants of Health     Financial Resource Strain:     Difficulty of Paying Living Expenses: Not on file   Food Insecurity:     Worried About Running Out of Food in the Last Year: Not on file    Lidia of Food in the Last Year: Not on file   Transportation Needs:     Lack of Transportation (Medical): Not on file    Lack of Transportation (Non-Medical):  Not on file   Physical Activity:     Days of Exercise per Week: Not on file    Minutes of Exercise per Session: Not on file   Stress:     Feeling of Stress : Not on file   Social Connections:     Frequency of Communication with Friends and Family: Not on file    Frequency of Social Gatherings with Friends and Family: Not on file    Attends Tenriism Services: Not on file    Active Member of [address] [place] [address] [employer] or Organizations: Not on file    Attends Club or Organization Meetings: Not on file    Marital Status: Not on file   Intimate Partner Violence:     Fear of Current or Ex-Partner: Not on file    Emotionally Abused: Not on file    Physically Abused: Not on file    Sexually Abused: Not on file   Housing Stability:     Unable to Pay for Housing in the Last Year: Not on file    Number of [place] in the Last Year: Not on file    Unstable Housing in the Last Year: Not on file       Patient Vitals for the past 24 hrs:   BP Temp Temp src Pulse Resp SpO2 Height Weight   22 0745 113/63 98 °F (36.7 °C) Oral 105 15 92 % -- --   02/09/22 0715 108/71 98 °F (36.7 °C) Oral 103 16 96 % -- --   02/09/22 0700 110/72 98.2 °F (36.8 °C) Oral 101 15 96 % -- --   02/09/22 0655 108/70 98.3 °F (36.8 °C) Oral 102 16 95 % -- --   02/09/22 0654 105/71 98.2 °F (36.8 °C) Oral 102 -- -- -- --   02/09/22 0618 108/65 98.2 °F (36.8 °C) Oral 92 15 95 % -- --   02/09/22 0400 103/60 -- -- 95 16 96 % -- --   02/09/22 0335 (!) 93/56 98 °F (36.7 °C) Oral 91 15 99 % -- --   02/09/22 0213 121/65 -- -- 98 15 94 % 5' 1\" (1.549 m) 130 lb (59 kg)           Results for orders placed or performed during the hospital encounter of 02/09/22   COVID-19, Rapid    Specimen: Nasopharyngeal Swab   Result Value Ref Range    SARS-CoV-2, NAAT Not Detected Not Detected   Lactic Acid, Plasma   Result Value Ref Range    Lactic Acid 1.7 0.4 - 2.0 mmol/L   CBC Auto Differential   Result Value Ref Range    WBC 7.6 4.0 - 11.0 K/uL    RBC 1.44 (L) 4.00 - 5.20 M/uL    Hemoglobin 5.5 (LL) 12.0 - 16.0 g/dL    Hematocrit 16.7 (LL) 36.0 - 48.0 %    .9 (H) 80.0 - 100.0 fL    MCH 38.0 (H) 26.0 - 34.0 pg    MCHC 32.8 31.0 - 36.0 g/dL    RDW 23.1 (H) 12.4 - 15.4 %    Platelets 59 (L) 092 - 450 K/uL    MPV 7.7 5.0 - 10.5 fL    PLATELET SLIDE REVIEW Decreased     SLIDE REVIEW see below     Neutrophils % 93.1 %    Lymphocytes % 4.4 %    Monocytes % 2.1 %    Eosinophils % 0.3 %    Basophils % 0.1 %    Neutrophils Absolute 7.0 1.7 - 7.7 K/uL    Lymphocytes Absolute 0.3 (L) 1.0 - 5.1 K/uL    Monocytes Absolute 0.2 0.0 - 1.3 K/uL    Eosinophils Absolute 0.0 0.0 - 0.6 K/uL    Basophils Absolute 0.0 0.0 - 0.2 K/uL    Anisocytosis 2+ (A)    Comprehensive Metabolic Panel w/ Reflex to MG   Result Value Ref Range    Sodium 135 (L) 136 - 145 mmol/L    Potassium reflex Magnesium 4.3 3.5 - 5.1 mmol/L    Chloride 102 99 - 110 mmol/L    CO2 23 21 - 32 mmol/L    Anion Gap 10 3 - 16    Glucose 184 (H) 70 - 99 mg/dL    BUN 30 (H) 7 - 20 mg/dL    CREATININE 0.7 0.6 - 1.2 mg/dL    GFR Non- >60 >60    GFR African American >60 >60    Calcium 9.0 8.3 - 10.6 mg/dL    Total Protein 6.2 (L) 6.4 - 8.2 g/dL    Albumin 4.1 3.4 - 5.0 g/dL    Albumin/Globulin Ratio 2.0 1.1 - 2.2    Total Bilirubin 0.8 0.0 - 1.0 mg/dL    Alkaline Phosphatase 97 40 - 129 U/L    ALT 6 (L) 10 - 40 U/L    AST 6 (L) 15 - 37 U/L   Blood occult stool #1   Result Value Ref Range    Occult Blood Diagnostic Result: Negative  Normal range: Negative      Troponin   Result Value Ref Range    Troponin 0.04 (H) <0.01 ng/mL   Brain Natriuretic Peptide   Result Value Ref Range    Pro-BNP 3,999 (H) 0 - 449 pg/mL   Lipase   Result Value Ref Range    Lipase 16.0 13.0 - 60.0 U/L   EKG 12 Lead   Result Value Ref Range    Ventricular Rate 93 BPM    Atrial Rate 93 BPM    P-R Interval 162 ms    QRS Duration 92 ms    Q-T Interval 382 ms    QTc Calculation (Bazett) 474 ms    P Axis 24 degrees    R Axis 21 degrees    T Axis -10 degrees    Diagnosis       Sinus rhythm with Premature atrial complexesVoltage criteria for left ventricular hypertrophyST & T wave abnormality, consider inferior ischemiaProlonged QTAbnormal ECGWhen compared with ECG of 02-FEB-2022 22:22,Premature atrial complexes are now PresentConfirmed by Bacilio Bruce MD, [facility] ([id number]) on 2/9/2022 7:07:36 AM       CT ABDOMEN PELVIS W IV CONTRAST Additional Contrast? None    Result Date: 2/9/2022  EXAMINATION: CT OF THE ABDOMEN AND PELVIS WITH CONTRAST 2/9/2022 6:12 am TECHNIQUE: CT of the abdomen and pelvis was performed with the administration of intravenous contrast. Multiplanar reformatted images are provided for review. Dose modulation, iterative reconstruction, and/or weight based adjustment of the mA/kV was utilized to reduce the radiation dose to as low as reasonably achievable. COMPARISON: None.  HISTORY: ORDERING SYSTEM PROVIDED HISTORY: hbg drop TECHNOLOGIST PROVIDED HISTORY: Additional Contrast?->None Reason for exam:->hbg drop Decision Support Exception - unselect if not a suspected or confirmed emergency medical condition->Emergency Medical Condition (MA) Reason for Exam: gi problem FINDINGS: Lower Chest: Cardiomegaly, pulmonary vascular congestion and pulmonary edema. Small hiatal hernia. Organs: The liver is unremarkable. Gallbladder wall calcification is present. No surrounding inflammatory change. The pancreas, adrenal glands, kidneys and visualized ureters are unremarkable. Calcified granulomas are present in the spleen. GI/Bowel: Stomach and duodenal sweep demonstrate no acute abnormality. There is no evidence of bowel obstruction. No evidence of abnormal bowel wall thickening or distension. No evidence of appendicitis but the appendix is not seen. Mild sigmoid diverticulosis. There is moderate prominence at the anal rectal junction region, incompletely imaged. Pelvis: The bladder is unremarkable. Status post hysterectomy. Peritoneum/Retroperitoneum: No evidence of ascites or free air. No evidence of lymphadenopathy. Aorta is normal in caliber. Bones/Soft Tissues: No acute bone or soft tissue abnormality. Small fat containing umbilical hernia with no associated inflammatory change. Congestive heart failure. Small hiatal hernia. Porcelain gallbladder. Evidence of old granulomatous disease. Mild sigmoid diverticulosis. Soft tissue prominence incompletely imaged at the anal rectal junction region. Correlate clinically. XR CHEST PORTABLE    Result Date: 2/9/2022  EXAMINATION: ONE XRAY VIEW OF THE CHEST 2/9/2022 7:34 am COMPARISON: 02/02/2022 HISTORY: ORDERING SYSTEM PROVIDED HISTORY: PAIN TECHNOLOGIST PROVIDED HISTORY: Reason for exam:->PAIN Reason for Exam: nausea and vomiting FINDINGS: Lung volumes are low. Patient is rotated.   Atherosclerotic change seen in aorta Heterogeneous opacity is seen throughout the lungs bilaterally, increased compared to prior, with vascular indistinctness Degenerative changes are seen in the shoulder joints bilaterally, severe, with loose bodies also noted     Increased bilateral airspace disease. Vascular indistinctness favors edema over pneumonia     XR CHEST PORTABLE    Result Date: 2/2/2022  EXAMINATION: ONE XRAY VIEW OF THE CHEST 2/2/2022 10:32 pm COMPARISON: 01/22/2014 HISTORY: ORDERING SYSTEM PROVIDED HISTORY: cp TECHNOLOGIST PROVIDED HISTORY: Reason for exam:->cp Reason for Exam: chest pain FINDINGS: The lungs are without acute focal process. There is no effusion or pneumothorax. The cardiomediastinal silhouette is stable. The osseous structures are stable. Bilateral glenohumeral joint osteoarthritis. No acute process. New Prescriptions    No medications on file       1. Iron deficiency anemia due to chronic blood loss    2. MDS (myelodysplastic syndrome) ([facility]    3. Systolic congestive heart failure, unspecified HF chronicity (HCC)    4. Abdominal pain, epigastric    5. Anticoagulated by anticoagulation treatment    6. Atrial fibrillation, unspecified type [facility])        Patient is receiving blood at this point  Patient will be admitted to the hospital for further ongoing care including full resuscitation from her anemia, oncology consultation, cardiac consultation,. Patient did undergo 40 minutes of critical care time (no procedure time)  Continuous cardiac monitoring performed patient was transfused blood  Multiple blood test performed and reviewed  Advanced imaging including CT and chest x-rays ordered and reviewed      I discussed the case with the attending accepting the patient all labs x-rays and clinical status of the patient were reviewed with the attending who graciously accepted the patient for continued care.            Kerwin Stover MD  02/09/22 1346

## 2022-02-09 NOTE — ED NOTES
[facility] here to transport pt. Pt remains alert and without c/o's.  Daughter remains @ bedside     Arnold Cheung RN  02/09/22 [id number]

## 2022-02-09 NOTE — ED NOTES
Report called to Cole0 Amberly Warren @ [facility] in [address] format     Isaura Figures, [place]  02/09/22 0075

## 2022-02-10 PROBLEM — R07.9 CHEST PAIN: Status: ACTIVE | Noted: 2022-02-10

## 2022-02-10 LAB
ABO/RH: NORMAL
ANION GAP SERPL CALCULATED.3IONS-SCNC: 12 MMOL/L (ref 3–16)
ANTI-XA UNFRAC HEPARIN: 0.27 IU/ML (ref 0.3–0.7)
ANTI-XA UNFRAC HEPARIN: 0.35 IU/ML (ref 0.3–0.7)
ANTIBODY SCREEN: NORMAL
APTT: 43.1 SEC (ref 26.2–38.6)
BASOPHILS ABSOLUTE: 0 K/UL (ref 0–0.2)
BASOPHILS RELATIVE PERCENT: 0.2 %
BLOOD BANK DISPENSE STATUS: NORMAL
BLOOD BANK DISPENSE STATUS: NORMAL
BLOOD BANK PRODUCT CODE: NORMAL
BLOOD BANK PRODUCT CODE: NORMAL
BLOOD SMEAR REVIEW: NORMAL
BPU ID: NORMAL
BPU ID: NORMAL
BUN BLDV-MCNC: 31 MG/DL (ref 7–20)
CALCIUM SERPL-MCNC: 9 MG/DL (ref 8.3–10.6)
CHLORIDE BLD-SCNC: 101 MMOL/L (ref 99–110)
CHOLESTEROL, TOTAL: 110 MG/DL (ref 0–199)
CO2: 20 MMOL/L (ref 21–32)
CREAT SERPL-MCNC: 0.7 MG/DL (ref 0.6–1.2)
DESCRIPTION BLOOD BANK: NORMAL
DESCRIPTION BLOOD BANK: NORMAL
EOSINOPHILS ABSOLUTE: 0 K/UL (ref 0–0.6)
EOSINOPHILS RELATIVE PERCENT: 0 %
FERRITIN: 359.2 NG/ML (ref 15–150)
FOLATE: 14.97 NG/ML (ref 4.78–24.2)
GFR AFRICAN AMERICAN: >60
GFR NON-AFRICAN AMERICAN: >60
GLUCOSE BLD-MCNC: 183 MG/DL (ref 70–99)
HCT VFR BLD CALC: 20.8 % (ref 36–48)
HCT VFR BLD CALC: 21 % (ref 36–48)
HCT VFR BLD CALC: 25.9 % (ref 36–48)
HCT VFR BLD CALC: 27.7 % (ref 36–48)
HCT VFR BLD CALC: 30 % (ref 36–48)
HDLC SERPL-MCNC: 38 MG/DL (ref 40–60)
HEMOGLOBIN: 6.8 G/DL (ref 12–16)
HEMOGLOBIN: 8.5 G/DL (ref 12–16)
HEMOGLOBIN: 9.1 G/DL (ref 12–16)
HEMOGLOBIN: 9.8 G/DL (ref 12–16)
IMMATURE RETIC FRACT: 0.68 (ref 0.21–0.37)
LACTATE DEHYDROGENASE: 368 U/L (ref 100–190)
LDL CHOLESTEROL CALCULATED: 53 MG/DL
LYMPHOCYTES ABSOLUTE: 0.5 K/UL (ref 1–5.1)
LYMPHOCYTES RELATIVE PERCENT: 2.4 %
MCH RBC QN AUTO: 34.9 PG (ref 26–34)
MCH RBC QN AUTO: 35.6 PG (ref 26–34)
MCHC RBC AUTO-ENTMCNC: 32.6 G/DL (ref 31–36)
MCHC RBC AUTO-ENTMCNC: 32.6 G/DL (ref 31–36)
MCV RBC AUTO: 107 FL (ref 80–100)
MCV RBC AUTO: 109.3 FL (ref 80–100)
MONOCYTES ABSOLUTE: 0.8 K/UL (ref 0–1.3)
MONOCYTES RELATIVE PERCENT: 4.3 %
NEUTROPHILS ABSOLUTE: 18.1 K/UL (ref 1.7–7.7)
NEUTROPHILS RELATIVE PERCENT: 93.1 %
PDW BLD-RTO: 28.1 % (ref 12.4–15.4)
PDW BLD-RTO: 28.3 % (ref 12.4–15.4)
PLATELET # BLD: 55 K/UL (ref 135–450)
PLATELET # BLD: 57 K/UL (ref 135–450)
PMV BLD AUTO: 8.6 FL (ref 5–10.5)
PMV BLD AUTO: 8.8 FL (ref 5–10.5)
POTASSIUM REFLEX MAGNESIUM: 4.4 MMOL/L (ref 3.5–5.1)
RBC # BLD: 1.92 M/UL (ref 4–5.2)
RBC # BLD: 2.42 M/UL (ref 4–5.2)
RETICULOCYTE ABSOLUTE COUNT: 0.03 M/UL (ref 0.02–0.1)
RETICULOCYTE COUNT PCT: 1.67 % (ref 0.5–2.18)
SODIUM BLD-SCNC: 133 MMOL/L (ref 136–145)
TRIGL SERPL-MCNC: 96 MG/DL (ref 0–150)
TROPONIN: 1.19 NG/ML
TROPONIN: 1.21 NG/ML
VITAMIN B-12: 707 PG/ML (ref 211–911)
VITAMIN B-12: 775 PG/ML (ref 211–911)
VLDLC SERPL CALC-MCNC: 19 MG/DL
WBC # BLD: 16.3 K/UL (ref 4–11)
WBC # BLD: 19.4 K/UL (ref 4–11)

## 2022-02-10 PROCEDURE — 83010 ASSAY OF HAPTOGLOBIN QUANT: CPT

## 2022-02-10 PROCEDURE — 99223 1ST HOSP IP/OBS HIGH 75: CPT | Performed by: INTERNAL MEDICINE

## 2022-02-10 PROCEDURE — 87070 CULTURE OTHR SPECIMN AEROBIC: CPT

## 2022-02-10 PROCEDURE — 87205 SMEAR GRAM STAIN: CPT

## 2022-02-10 PROCEDURE — 2580000003 HC RX 258: Performed by: PHYSICIAN ASSISTANT

## 2022-02-10 PROCEDURE — 82747 ASSAY OF FOLIC ACID RBC: CPT

## 2022-02-10 PROCEDURE — C9113 INJ PANTOPRAZOLE SODIUM, VIA: HCPCS | Performed by: STUDENT IN AN ORGANIZED HEALTH CARE EDUCATION/TRAINING PROGRAM

## 2022-02-10 PROCEDURE — 83615 LACTATE (LD) (LDH) ENZYME: CPT

## 2022-02-10 PROCEDURE — 83550 IRON BINDING TEST: CPT

## 2022-02-10 PROCEDURE — 6370000000 HC RX 637 (ALT 250 FOR IP): Performed by: INTERNAL MEDICINE

## 2022-02-10 PROCEDURE — 93005 ELECTROCARDIOGRAM TRACING: CPT

## 2022-02-10 PROCEDURE — 6370000000 HC RX 637 (ALT 250 FOR IP): Performed by: STUDENT IN AN ORGANIZED HEALTH CARE EDUCATION/TRAINING PROGRAM

## 2022-02-10 PROCEDURE — 2060000000 HC ICU INTERMEDIATE R&B

## 2022-02-10 PROCEDURE — 82607 VITAMIN B-12: CPT

## 2022-02-10 PROCEDURE — 36415 COLL VENOUS BLD VENIPUNCTURE: CPT

## 2022-02-10 PROCEDURE — 6360000002 HC RX W HCPCS: Performed by: STUDENT IN AN ORGANIZED HEALTH CARE EDUCATION/TRAINING PROGRAM

## 2022-02-10 PROCEDURE — 2580000003 HC RX 258: Performed by: STUDENT IN AN ORGANIZED HEALTH CARE EDUCATION/TRAINING PROGRAM

## 2022-02-10 PROCEDURE — 85025 COMPLETE CBC W/AUTO DIFF WBC: CPT

## 2022-02-10 PROCEDURE — 85014 HEMATOCRIT: CPT

## 2022-02-10 PROCEDURE — 87040 BLOOD CULTURE FOR BACTERIA: CPT

## 2022-02-10 PROCEDURE — 85520 HEPARIN ASSAY: CPT

## 2022-02-10 PROCEDURE — 84484 ASSAY OF TROPONIN QUANT: CPT

## 2022-02-10 PROCEDURE — 6370000000 HC RX 637 (ALT 250 FOR IP): Performed by: PHYSICIAN ASSISTANT

## 2022-02-10 PROCEDURE — 83540 ASSAY OF IRON: CPT

## 2022-02-10 PROCEDURE — 85018 HEMOGLOBIN: CPT

## 2022-02-10 PROCEDURE — 87631 RESP VIRUS 3-5 TARGETS: CPT

## 2022-02-10 PROCEDURE — 87449 NOS EACH ORGANISM AG IA: CPT

## 2022-02-10 PROCEDURE — 82728 ASSAY OF FERRITIN: CPT

## 2022-02-10 PROCEDURE — 51798 US URINE CAPACITY MEASURE: CPT

## 2022-02-10 PROCEDURE — 80048 BASIC METABOLIC PNL TOTAL CA: CPT

## 2022-02-10 PROCEDURE — 6360000002 HC RX W HCPCS: Performed by: PHYSICIAN ASSISTANT

## 2022-02-10 RX ORDER — MORPHINE SULFATE 2 MG/ML
2 INJECTION, SOLUTION INTRAMUSCULAR; INTRAVENOUS ONCE
Status: COMPLETED | OUTPATIENT
Start: 2022-02-10 | End: 2022-02-10

## 2022-02-10 RX ORDER — PROMETHAZINE HYDROCHLORIDE 25 MG/ML
12.5 INJECTION, SOLUTION INTRAMUSCULAR; INTRAVENOUS ONCE
Status: COMPLETED | OUTPATIENT
Start: 2022-02-10 | End: 2022-02-10

## 2022-02-10 RX ORDER — SODIUM CHLORIDE 9 MG/ML
INJECTION, SOLUTION INTRAVENOUS CONTINUOUS
Status: DISCONTINUED | OUTPATIENT
Start: 2022-02-10 | End: 2022-02-11

## 2022-02-10 RX ADMIN — HEPARIN SODIUM 3540 UNITS: 1000 INJECTION INTRAVENOUS; SUBCUTANEOUS at 01:16

## 2022-02-10 RX ADMIN — NITROGLYCERIN 0.5 INCH: 20 OINTMENT TOPICAL at 18:21

## 2022-02-10 RX ADMIN — ONDANSETRON 4 MG: 2 INJECTION INTRAMUSCULAR; INTRAVENOUS at 13:20

## 2022-02-10 RX ADMIN — METOPROLOL TARTRATE 37.5 MG: 25 TABLET, FILM COATED ORAL at 20:59

## 2022-02-10 RX ADMIN — ONDANSETRON 4 MG: 2 INJECTION INTRAMUSCULAR; INTRAVENOUS at 20:59

## 2022-02-10 RX ADMIN — MORPHINE SULFATE 2 MG: 2 INJECTION, SOLUTION INTRAMUSCULAR; INTRAVENOUS at 09:06

## 2022-02-10 RX ADMIN — PROMETHAZINE HYDROCHLORIDE 12.5 MG: 25 INJECTION INTRAMUSCULAR; INTRAVENOUS at 11:18

## 2022-02-10 RX ADMIN — ASPIRIN 81 MG: 81 TABLET, CHEWABLE ORAL at 09:06

## 2022-02-10 RX ADMIN — PANTOPRAZOLE SODIUM 40 MG: 40 INJECTION, POWDER, FOR SOLUTION INTRAVENOUS at 09:05

## 2022-02-10 RX ADMIN — MORPHINE SULFATE 2 MG: 2 INJECTION, SOLUTION INTRAMUSCULAR; INTRAVENOUS at 00:01

## 2022-02-10 RX ADMIN — ONDANSETRON 4 MG: 4 TABLET, ORALLY DISINTEGRATING ORAL at 18:01

## 2022-02-10 RX ADMIN — HEPARIN SODIUM 12 UNITS/KG/HR: 5000 INJECTION INTRAVENOUS; SUBCUTANEOUS at 01:20

## 2022-02-10 RX ADMIN — HEPARIN SODIUM 1770 UNITS: 1000 INJECTION INTRAVENOUS; SUBCUTANEOUS at 14:58

## 2022-02-10 RX ADMIN — METOPROLOL TARTRATE 12.5 MG: 25 TABLET, FILM COATED ORAL at 14:36

## 2022-02-10 RX ADMIN — METOPROLOL TARTRATE 25 MG: 25 TABLET, FILM COATED ORAL at 11:18

## 2022-02-10 RX ADMIN — NITROGLYCERIN 0.5 INCH: 20 OINTMENT TOPICAL at 14:38

## 2022-02-10 RX ADMIN — LIDOCAINE HYDROCHLORIDE: 20 SOLUTION ORAL; TOPICAL at 09:04

## 2022-02-10 RX ADMIN — ONDANSETRON 4 MG: 2 INJECTION INTRAMUSCULAR; INTRAVENOUS at 04:49

## 2022-02-10 RX ADMIN — NITROGLYCERIN 0.5 INCH: 20 OINTMENT TOPICAL at 23:58

## 2022-02-10 RX ADMIN — PANTOPRAZOLE SODIUM 40 MG: 40 INJECTION, POWDER, FOR SOLUTION INTRAVENOUS at 20:59

## 2022-02-10 RX ADMIN — SODIUM CHLORIDE: 9 INJECTION, SOLUTION INTRAVENOUS at 13:24

## 2022-02-10 ASSESSMENT — ENCOUNTER SYMPTOMS
VOMITING: 1
CHEST TIGHTNESS: 0
ABDOMINAL DISTENTION: 0
SHORTNESS OF BREATH: 0
COLOR CHANGE: 0
BLOOD IN STOOL: 0
DIARRHEA: 0
RHINORRHEA: 0
CONSTIPATION: 0
NAUSEA: 1
WHEEZING: 0
COUGH: 0
SORE THROAT: 0
ABDOMINAL PAIN: 1

## 2022-02-10 ASSESSMENT — PAIN DESCRIPTION - LOCATION: LOCATION: CHEST;BACK

## 2022-02-10 ASSESSMENT — PAIN SCALES - GENERAL
PAINLEVEL_OUTOF10: 5
PAINLEVEL_OUTOF10: 8
PAINLEVEL_OUTOF10: 5
PAINLEVEL_OUTOF10: 0

## 2022-02-10 ASSESSMENT — PAIN DESCRIPTION - FREQUENCY: FREQUENCY: CONTINUOUS

## 2022-02-10 ASSESSMENT — PAIN DESCRIPTION - DIRECTION: RADIATING_TOWARDS: BACK

## 2022-02-10 ASSESSMENT — PAIN DESCRIPTION - DESCRIPTORS: DESCRIPTORS: ACHING;CONSTANT

## 2022-02-10 ASSESSMENT — PAIN DESCRIPTION - PROGRESSION: CLINICAL_PROGRESSION: NOT CHANGED

## 2022-02-10 ASSESSMENT — PAIN DESCRIPTION - PAIN TYPE: TYPE: ACUTE PAIN

## 2022-02-10 ASSESSMENT — PAIN DESCRIPTION - ONSET: ONSET: ON-GOING

## 2022-02-10 ASSESSMENT — PAIN DESCRIPTION - ORIENTATION: ORIENTATION: INNER

## 2022-02-10 NOTE — CONSULTS
HYSTERECTOMY      age 34   [facility]  2/6/2012    left side       Past Medical History:   Diagnosis Date    Allergic rhinitis     Cancer ([facility]     breast    GI bleed     Upper GI Bleed with knee surgery 2012    Glaucoma     bilateral    Hyperlipidemia     Hypertension     Myelodysplastic syndrome ([facility]     Radiation therapy complication 0660    UTI (lower urinary tract infection)        Prior to Admission medications    Medication Sig Start Date End Date Taking? Authorizing Provider   famotidine (PEPCID) 20 MG tablet TAKE ONE TABLET BY MOUTH TWICE A DAY AS NEEDED FOR HEARTBURN 1/20/22  Yes Historical Provider, MD   sertraline (ZOLOFT) 50 MG tablet TAKE ONE TABLET BY MOUTH DAILY 12/2/21  Yes Historical Provider, MD   hydroxyurea (HYDREA) 500 MG chemo capsule Take by mouth daily   Yes Historical Provider, MD   allopurinol (ZYLOPRIM) 300 MG tablet Take 300 mg by mouth daily   Yes Historical Provider, MD   metoprolol tartrate (LOPRESSOR) 25 MG tablet Take 25 mg by mouth 2 times daily   Yes Historical Provider, MD   apixaban (ELIQUIS) 5 MG TABS tablet Take by mouth 2 times daily   Yes Historical Provider, MD   acetaminophen (TYLENOL) 500 MG tablet Take 500 mg by mouth every 6 hours as needed for Pain   Yes Historical Provider, MD   Brinzolamide-Brimonidine (Arbie Nay) 1-0.2 % SUSP Apply to eye    Yes Historical Provider, MD   Bimatoprost (LUMIGAN) 0.01 % SOLN Apply 1 drop to eye nightly Each eye    Yes Historical Provider, MD   NIFEdipine (PROCARDIA XL) 30 MG extended release tablet Take 1 tablet by mouth daily for 7 doses 2/3/22 2/10/22  Merrily Blocker, MD       Allergies   Allergen Reactions    Oxycodone        No family history on file. Social History     Socioeconomic History    Marital status:       Spouse name: Not on file    Number of children: Not on file    Years of education: Not on file    Highest education level: Not on file   Occupational History    Not on file   Tobacco Use    Smoking status: Former Smoker     Quit date: 1997     Years since quittin.9    Smokeless tobacco: Never Used   Vaping Use    Vaping Use: Never used   Substance and Sexual Activity    Alcohol use: No    Drug use: No    Sexual activity: Not on file   Other Topics Concern    Not on file   Social History Narrative    Not on file     Social Determinants of Health     Financial Resource Strain:     Difficulty of Paying Living Expenses: Not on file   Food Insecurity:     Worried About Running Out of Food in the Last Year: Not on file    Lidia of Food in the Last Year: Not on file   Transportation Needs:     Lack of Transportation (Medical): Not on file    Lack of Transportation (Non-Medical):  Not on file   Physical Activity:     Days of Exercise per Week: Not on file    Minutes of Exercise per Session: Not on file   Stress:     Feeling of Stress : Not on file   Social Connections:     Frequency of Communication with Friends and Family: Not on file    Frequency of Social Gatherings with Friends and Family: Not on file    Attends Restorationist Services: Not on file    Active Member of [address] [place] [address] or Organizations: Not on file    Attends Club or Organization Meetings: Not on file    Marital Status: Not on file   Intimate Partner Violence:     Fear of Current or Ex-Partner: Not on file    Emotionally Abused: Not on file    Physically Abused: Not on file    Sexually Abused: Not on file   Housing Stability:     Unable to Pay for Housing in the Last Year: Not on file    Number of [place] in the Last Year: Not on file    Unstable Housing in the Last Year: Not on file        ROS:  As noted above, otherwise remainder of 10-point ROS negative    Physical Exam:     Vital Signs:  /71 Comment: post blood  Pulse 110   Temp 97.8 °F (36.6 °C) (Oral)   Resp 17   Ht 5' 1\" (1.549 m)   Wt 145 lb 15.1 oz (66.2 kg)   SpO2 97%   BMI 27.58 kg/m²     Weight:    Wt Readings from Last 3 Encounters: 02/10/22 145 lb 15.1 oz (66.2 kg)   22 130 lb (59 kg)   22 130 lb (59 kg)         General: Awake, alert and oriented. HEENT: normocephalic, PERRL, no scleral erythema or icterus, Oral mucosa moist and intact, throat clear  NECK: supple without palpable adenopathy  BACK: Straight negative CVAT  SKIN: warm dry and intact without lesions rashes or masses  CHEST: CTA bilaterally without use of accessory muscles  CV: Normal S1 S2, RRR, no MRG  ABD: NT ND normoactive BS, no palpable masses or hepatosplenomegaly  EXTREMITIES: without edema, denies calf tenderness  NEURO: CN II - XII grossly intact    Laboratory Data:   CBC:   Recent Labs     22  0522  1423 02/09/22  2356 02/10/22  0430 02/10/22  0922   WBC 7.6  --  16.3* 19.4*  --    HGB 5.5*   < > 6.8* 8.5* 9.8*   HCT 16.7*   < > 20.8*  21.0* 25.9* 30.0*   .9*  --  109.3* 107.0*  --    PLT 59*  --  57* 55*  --     < > = values in this interval not displayed. BMP/Mag:  Recent Labs     02/09/22  0525 02/10/22  0430   * 133*   K 4.3 4.4    101   CO2 23 20*   BUN 30* 31*   CREATININE 0.7 0.7     LIVP:   Recent Labs     22   AST 6*   ALT 6*   LIPASE 16.0   BILITOT 0.8   ALKPHOS 97     Coags:   Recent Labs     22   APTT 43.1*     Uric Acid No results for input(s): LABURIC in the last 72 hours. [place] [facility] [place]  This showed a hypercellular marrow (>95%) with 1% blasts. Focal 1+ reticulin fibrosis was present. CML and CMML were felt to be in the differential.  PCR for BCR/ABL was negative. JAK2 V617F qualitative PCR was positive.  46,XX[20]. In total, the pathologist favored pre-fibrotic/early primary myelofibrosis. She was started on Hydrea 1000 mg daily. Molecular testin.  Next-gen myeloid disorder profile of the marrow aspirate collected on 2021 showed:  A. TET2 R9000Q  B. TET2 [id number]*70  C. JAK2 V617F    PROBLEM LIST:                   TREATMENT: ASSESSMENT AND PLAN:          Jeni Bryson is a 75-year-old female with history of myelofibrosis, atrial fibrillation on Xarelto, anemia who presents to the hospital complaining of nausea vomiting and chest pain. 1) Primary myelofibrosis  - diagnosed in 4/2021 with WBC of 149K  - BCR-ABL negative   -  LAUREN 2 V617F positive, no constitutional symptoms, no splenomegaly  - Treated with hydrea by Dr Nicholas Hart  - With sudden decline in counts especially hgb and plts, rule out other causes  - Stop hydrea ffor now  - Needs a repeat bone marrow bx, patient wants it done under conscious sedation, will set that up outpatient   - Will discuss jakafi or clinical research trial with another Jak2 inhibitor     2) Atrial fibrillation  - controlled   - keep plts >50K while on anticoagulation  - Monitor closely for bleeding    3) Chest pain  - possible esophagitis?   - GI and cardiology consulted  - Pt feels better this am  0 She is NPO and on heparin drip  - Received aspirin in the eD    Thank you for this consult     Pamella Hammans,

## 2022-02-10 NOTE — PROGRESS NOTES
Pharmacy Progress Note    Heparin low dose weight based infusion is ordered for patient. Per chart review, patient has received an oral Factor Xa inhibitor (Eliquis) within the last 72 hours. As oral Factor Xa inhibitors can impact the anti-Xa test calibrated to unfractionated heparin, Heparin infusion will be monitored and adjusted using aPTT's per [address]. APTT algorithm:  aPTT < 45         Heparin 60 units/kg bolus     Increase infusion by 4 units/kg/hr                            (maximum 4,000 units)  aPTT 45-59       Heparin 30 units/kg bolus     Increase infusion by 2 units/kg/hr                           (maximum 2,000 units)  aPTT 60-90      No bolus                                No change  aPTT 91-97      No bolus                                Decrease infusion by 1 unit/kg/hr  APTT     No bolus Decrease infusion by 2 units/kg/hr  aPTT  > 112     Hold heparin for 1 hour         Decrease infusion by 3 units/kg/hr    Obtain aPTT 6 hours after initial bolus and 6 hours after any dose change until two consecutive therapeutic aPTTs are achieved - then daily. Pharmacy will monitor daily to assist with adjustments & ordering of labs as needed. If patient remains on heparin infusion 72 hours from last dose of oral factor Xa inhibitor, pharmacy will change infusion back to usual monitoring via the Anti-Xa algorithm per [address] as the interaction will no longer occur at that time.     Please call pharmacy with any questions -  Ching Manjarrez, PharmD  Main pharmacy ext [phone]

## 2022-02-10 NOTE — CONSULTS
[address]         Reason for Consultation/Chief Complaint: \"I have been having positive troponin and chest pain. \"       History of Present Illness:  Senia Guerrero is a 78 y.o. patient who presented to the hospital with complaints of chest pain. The patient has myelodysplastic syndrome with history of GI bleeding and presented with a hemoglobin of 5.5. Troponin alexandra to 1. 19. The patient has been transfused PRBCs and her chest pain has resolved. Platelet count is only 55,000 presently. Past Medical History:   has a past medical history of Allergic rhinitis, Cancer ([address] GI bleed, Glaucoma, Hyperlipidemia, Hypertension, Myelodysplastic syndrome ([address] Radiation therapy complication, and UTI (lower urinary tract infection). Surgical History:   has a past surgical history that includes Cataract removal with implant; Breast surgery (1999); Hysterectomy; and knee surgery (2/6/2012). Social History:   reports that she quit smoking about 24 years ago. She has never used smokeless tobacco. She reports that she does not drink alcohol and does not use drugs. Family History:  No evidence for sudden cardiac death or premature CAD    Home Medications:  Were reviewed and are listed in nursing record. and/or listed below  Prior to Admission medications    Medication Sig Start Date End Date Taking?  Authorizing Provider   famotidine (PEPCID) 20 MG tablet TAKE ONE TABLET BY MOUTH TWICE A DAY AS NEEDED FOR HEARTBURN 1/20/22  Yes Historical Provider, MD   sertraline (ZOLOFT) 50 MG tablet TAKE ONE TABLET BY MOUTH DAILY 12/2/21  Yes Historical Provider, MD   hydroxyurea (HYDREA) 500 MG chemo capsule Take by mouth daily   Yes Historical Provider, MD   allopurinol (ZYLOPRIM) 300 MG tablet Take 300 mg by mouth daily   Yes Historical Provider, MD   metoprolol tartrate (LOPRESSOR) 25 MG tablet Take 25 mg by mouth 2 times daily   Yes Historical Provider, MD   apixaban (ELIQUIS) 5 MG TABS tablet Take by mouth 2 times daily   Yes Historical Provider, MD   acetaminophen (TYLENOL) 500 MG tablet Take 500 mg by mouth every 6 hours as needed for Pain   Yes Historical Provider, MD   Brinzolamide-Brimonidine (Kathalene Samples) 1-0.2 % SUSP Apply to eye    Yes Historical Provider, MD   Bimatoprost (LUMIGAN) 0.01 % SOLN Apply 1 drop to eye nightly Each eye    Yes Historical Provider, MD   NIFEdipine (PROCARDIA XL) 30 MG extended release tablet Take 1 tablet by mouth daily for 7 doses 2/3/22 2/10/22  Lula Velarde MD        [facility] Medications:   metoprolol tartrate  25 mg Oral BID    sodium chloride flush  5-40 mL IntraVENous 2 times per day    pantoprazole  40 mg IntraVENous BID    aspirin  324 mg Oral Once    aspirin  81 mg Oral Daily     sodium chloride flush, sodium chloride, ondansetron **OR** ondansetron, polyethylene glycol, acetaminophen **OR** acetaminophen, sodium chloride, heparin (porcine), heparin (porcine)   sodium chloride      sodium chloride      sodium chloride      heparin (PORCINE) Infusion 12 Units/kg/hr (02/10/22 1108)       Allergies:  Oxycodone     Review of Systems:     A 14 ROS obtained and negative except as mentioned in HPI. · Constitutional: there has been no unanticipated weight loss. · Eyes: No visual changes or diplopia. No scleral icterus. · ENT: No Headaches, hearing loss or vertigo. No mouth sores or sore throat. · Cardiovascular: No loss of consciousness. No hemoptysis, pleuritic pain, or phlebitis. · Respiratory: No cough or wheezing, no sputum production. No hematemesis. · Gastrointestinal: No abdominal pain, appetite loss, blood in stools. No change in bowel or bladder habits. · Genitourinary: No dysuria, or hematuria. · Musculoskeletal:  No gait disturbance, weakness or joint complaints. · Integumentary: No rash or pruritis. · Neurological: No headache, diplopia,numbness or tingling.  No change in gait, balance, coordination, mood, affect, memory, mentation, behavior. · Psychiatric: No anxiety,  · Endocrine: No malaise,  · Hematologic/Lymphatic: No abnormal bruising  · Allergic/Immunologic: No nasal congestion      Physical Examination:    Vitals:    02/10/22 1118   BP: 115/62   Pulse: 114   Resp:    Temp:    SpO2:     Weight: 145 lb 15.1 oz (66.2 kg)         General Appearance:  Alert, cooperative, no distress, appears stated age   Head:  Normocephalic, without obvious abnormality, atraumatic   Eyes:  PERRL   Nose: Nares normal,   Neck: Supple, JVP normal    Lungs:   Clear to auscultation bilaterally, respirations unlabored   Chest Wall:  No tenderness or deformity   Heart:  Regular rate and rhythm, normal S1, S2 normal, no murmur, I/VI HSM No rub. No S3 gallop   Abdomen:   Soft, non-tender, +bowel sounds   Extremities: no cyanosis, no clubbing , No  edema   Pulses: Symmetric  extremities   Skin: no gross lesions or rashes   Pysch: Normal mood and affect   Neurologic: No gross deficits.   CN II - XII grossly intact        Labs  CBC:   Lab Results   Component Value Date    WBC 19.4 02/10/2022    RBC 2.42 02/10/2022    HGB 9.8 02/10/2022    HCT 30.0 02/10/2022    .0 02/10/2022    RDW 28.1 02/10/2022    PLT 55 02/10/2022     CMP:    Lab Results   Component Value Date     02/10/2022    K 4.4 02/10/2022     02/10/2022    CO2 20 02/10/2022    BUN 31 02/10/2022    CREATININE 0.7 02/10/2022    GFRAA >60 02/10/2022    GFRAA 136 03/12/2012    AGRATIO 2.0 02/09/2022    LABGLOM >60 02/10/2022    GLUCOSE 183 02/10/2022    PROT 6.2 02/09/2022    CALCIUM 9.0 02/10/2022    BILITOT 0.8 02/09/2022    ALKPHOS 97 02/09/2022    AST 6 02/09/2022    ALT 6 02/09/2022     PT/INR:  No results found for: PTINR  Recent Labs     02/09/22  0525 02/09/22  2005 02/10/22  0430   TROPONINI 0.04* 0.78* 1.19*       EKG: Sinus tachycardia with inferolateral ST segment depression and mild T wave inversion    Assessment  Patient Active Problem List   Diagnosis    Left knee DJD  TIA (transient ischemic attack)    Transient cerebral ischemia    Other and unspecified hyperlipidemia    Essential hypertension    Occlusion and stenosis of carotid artery    HLD (hyperlipidemia)    AF (atrial fibrillation) (HCC)    Symptomatic anemia    Chest pain          Impression: Non-STEMI based on demand ischemia. Anemia. Possible GI bleeding. Myelodysplastic syndrome by history. .. Recommendations:    I had the opportunity to review the clinical symptoms and presentation of [facility]. I recommend that the patient undergo further cardiac treatment with beta-blocker and topical nitrates. She has been transfused packed red blood cells and is presently on a heparin drip. The patient is chest pain-free so I would defer angiography at this juncture and try and reassess if the hemoglobin remained stable. It may be worthwhile for patient to undergo GI evaluation. I discussed the patient with hematology and the hospital service. Thank you for allowing to us to participate in the care or [facility]. All questions and concerns were addressed to the patient. Alternatives to my treatment were discussed. The note was completed using EMR. Every effort was made to ensure accuracy; however, inadvertent computerized transcription errors may be present.        Juan C Breen MD [facility]

## 2022-02-10 NOTE — PLAN OF CARE
Recent Labs     02/09/22  2356   PLT 57*    Thrombocytopenia Precautions in place. Patient showing no signs or symptoms of active bleeding. Patient transfused blood products per orders - see flowsheet. Patient verbalizes understanding of all instructions. Will continue to assess and implement POC. Call light within reach and hourly rounding in place.

## 2022-02-10 NOTE — PROGRESS NOTES
Pt had heparin ordered for possible infarct. Pt with plt count of 57. Resident contacted. Given the go ahead to start with heparin infusion. Will continue to monitor.

## 2022-02-10 NOTE — PROGRESS NOTES
Nurse called with critical troponin of 0.78. Pt also with complaint of chest pain radiating to back. Resident contacted via [employer]. Stat EKG ordered and resident to pt bedside to evaluate. CT CTA, heparin gtt, PRN morphine ordered. Cardiology consulted. Will continue to monitor.

## 2022-02-10 NOTE — CONSULTS
[address] [place] [address]  GI Consultation                                                                 Patient: Jj Calderon  : 1940       Date:  2/10/2022    Subjective:       History of Present Illness  Patient is a 78 y.o.  female admitted with Anemia [D64.9] who is seen in consult for anemia and porcelain gallbladder. Patient has past medical history of anemia, MDS, systolic CHF, Afib on Eliquis, GIB, glaucoma, HTN. Patient was admitted for nausea, vomiting and chest pain. In the ED she was found to be anemic to 5.5. She received 1 unit at [place], and hemoglobin responded appropriately to of 7.5. However her hemoglobin continued to drop and last night at approximately 11 PM her hemoglobin was 6.8. She received an additional unit of PRBC and today her hemoglobin is 8.5. Her troponin was elevated overnight; cardiology was consulted CTA obtained due to concern for aortic aneurysm. 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 Patient has a history of GIB and states she bleeds easily with trauma. EGD in  showing gastric ulcer W/Dr. Leeanna Cervantes    On heparin gtt now  GI cocktail     Vitals significant for tachycardia, hypotension to 133'X systolic. Past Medical History:   Diagnosis Date    Allergic rhinitis     [facility]     breast    GI bleed     Upper GI Bleed with knee surgery     Glaucoma     bilateral    Hyperlipidemia     Hypertension     Myelodysplastic syndrome ([address]     Radiation therapy complication [address]    UTI (lower urinary tract infection)       Past Surgical History:   Procedure Laterality Date    BREAST SURGERY      lumpectomy right    CATARACT REMOVAL WITH IMPLANT      bilateral    HYSTERECTOMY      age 34    KNEE SURGERY  2012    left side      Past Endoscopic History see above    Admission Meds  No current facility-administered medications on file prior to encounter.      Current Outpatient Medications on File Prior to Encounter   Medication Sig Dispense Refill    famotidine (PEPCID) 20 MG tablet TAKE ONE TABLET BY MOUTH TWICE A DAY AS NEEDED FOR HEARTBURN      sertraline (ZOLOFT) 50 MG tablet TAKE ONE TABLET BY MOUTH DAILY      hydroxyurea (HYDREA) 500 MG chemo capsule Take by mouth daily      allopurinol (ZYLOPRIM) 300 MG tablet Take 300 mg by mouth daily      metoprolol tartrate (LOPRESSOR) 25 MG tablet Take 25 mg by mouth 2 times daily      apixaban (ELIQUIS) 5 MG TABS tablet Take by mouth 2 times daily      acetaminophen (TYLENOL) 500 MG tablet Take 500 mg by mouth every 6 hours as needed for Pain      Brinzolamide-Brimonidine (SIMBRINZA) 1-0.2 % SUSP Apply to eye       Bimatoprost (LUMIGAN) 0.01 % SOLN Apply 1 drop to eye nightly Each eye       NIFEdipine (PROCARDIA XL) 30 MG extended release tablet Take 1 tablet by mouth daily for 7 doses 7 tablet 0         Allergies  Allergies   Allergen Reactions    Oxycodone       Social   Social History     Tobacco Use    Smoking status: Former Smoker     Quit date: 1997     Years since quittin.9    Smokeless tobacco: Never Used   Substance Use Topics    Alcohol use: No        No family history on file. No family history of colon cancer, Crohn's disease, or ulcerative colitis. Review of Systems  Pertinent items are noted in HPI.        Physical Exam    /71 Comment: post blood  Pulse 110   Temp 97.8 °F (36.6 °C) (Oral)   Resp 17   Ht 5' 1\" (1.549 m)   Wt 145 lb 15.1 oz (66.2 kg)   SpO2 97%   BMI 27.58 kg/m²   General appearance: alert, cooperative, no distress, appears stated age  Anicteric, No Jaundice  Head: Normocephalic, without obvious abnormality  Lungs: clear to auscultation bilaterally, Normal Effort  Heart: regular rate and rhythm, normal S1 and S2, no murmurs or rubs  Abdomen: abnormal findings:  hypoactive bowel sounds and tenderness mild in the RUQ and diffusely   Extremities: atraumatic, no cyanosis or edema  Skin: warm and dry  Neuro: intact  AAOX3      Data Review:    Recent Labs     02/09/22  0525 02/09/22  1423 02/09/22 2005 02/09/22  2356 02/10/22  0430   WBC 7.6  --   --  16.3* 19.4*   HGB 5.5*   < > 7.3* 6.8* 8.5*   HCT 16.7*   < > 22.7* 20.8*  21.0* 25.9*   .9*  --   --  109.3* 107.0*   PLT 59*  --   --  57* 55*    < > = values in this interval not displayed. Recent Labs     02/09/22  0525 02/10/22  0430   * 133*   K 4.3 4.4    101   CO2 23 20*   BUN 30* 31*   CREATININE 0.7 0.7     Recent Labs     02/09/22  0525   AST 6*   ALT 6*   BILITOT 0.8   ALKPHOS 97     Recent Labs     02/09/22  0525   LIPASE 16.0                   Ultrasound:  none  CT-scan of abdomen and pelvis:     2/9/22:  1. No evidence of abdominal aortic aneurysm, dissection or intramural hematoma   2. Normal thoracic and abdominal aortic diameter   3. Bilateral lower lobe basilar pneumonia and effusions   4. Colonic diverticulosis   5. Porcelain gallbladder       Assessment:     Active Problems:    Anemia  Resolved Problems:    * No resolved hospital problems. *    Current N/V could be attributable to ACS. No blood in stool/emesis per patient or RN. No history of GI bleeding as OP. Stool is heme negative. Due to possible ACS not a candidate for endoscopy now      Recommendations:     - Monitor H&H  - Protonix 40 BID  - continue cardiac workup  - no urget intervention from a GI standpoint  Will discuss with Dr. Camila Schmitz    Thank you for the opportunity to participate in [address] [place] [address].     Deisy Conway MD     Patient seen examined discussed agree with note

## 2022-02-10 NOTE — PROGRESS NOTES
Called to patient's room for 10/10 chest pain radiating to her back. Repeat troponin was 0.04 -->0. 78. Stat EKG ordered and I felt there was some ST elevations in II, III, AVF. However upon talking with on-call cardiologist (Dr. Malini Kelley) he did not think there was any ST elevations in the above leads. He did recommend starting low dose heparin per ACS protocol. Added morphine PRN and started ASA. She also has some discrepancy in her BP measurements of 30 mm from her upper to lower extremities. It also appears her initial CXR appeared to have a widened mediastinum. Given the chest pain radiating to her back, BP discrepancies and widened mediastinum on initial CXR: will obtain CTA C/A/P w/ contrast and follow-up up on this. Will also give an additional unit of pRBC and keep above 8. Monitor for active bleeding.  RN updated on plan

## 2022-02-10 NOTE — CARE COORDINATION
Case Management Assessment           Initial Evaluation                Date / Time of Evaluation: 2/10/2022 11:22 AM                 Assessment Completed by: Camron Salinas    Patient Name: Rosa Elena Gayle     YOB: 1940  Diagnosis: Anemia [D64.9]     Date / Time: 2/9/2022  5:17 PM    Patient Admission Status: Inpatient    If patient is discharged prior to next notation, then this note serves as note for discharge by case management. Current PCP: Shaunna Ross,   Clinic Patient: No    Chart Reviewed: Yes  Patient/ Family Interviewed: Yes    Initial assessment completed at bedside with: Patient and daughter Gabriela Laird    Hospitalization in the last 30 days: Yes    Emergency Contacts:  Extended Emergency Contact Information  Primary Emergency Contact: Maureen Bentley  Mobile Phone: [phone]  Relation: Child  Secondary Emergency Contact: [address] Phone: [phone]  Mobile Phone: [phone]  Relation: Child    Advance Directives:   Code Status: Full 2021 Jensen Irelandy: Yes  Agent: Hermila Morales   Contact Number: [phone]    Copy present: No     In paper Chart: No    Scanned into EMR No    Financial  Payor: Bettina Bruner / Plan: Corrie Barbosa PPO / Product Type: Medicare /     Pre-cert required for SNF: No    Pharmacy    [address], [address] [place] [address] Drive  [address]  Phone: [phone] Fax: [address] [place] [address] [phone] Sharron Kellogg [phone]  [address] [place] [address]  Phone: [phone] Fax: [phone]      Potential assistance Purchasing Medications: Potential Assistance Purchasing Medications: No  Does Patient want to participate in local refill/ meds to beds program?:      Meds To Beds General Rules:  1.  Can ONLY be done Monday- Friday between 8: 30am-5pm  2. Prescription(s) must be in pharmacy by 3pm to be filled same day  3. Copy of patient's insurance/ prescription drug card and patient face sheet must be sent along with the prescription(s)  4. Cost of Rx cannot be added to hospital bill. If financial assistance is needed, please contact unit  or ;  or  CANNOT provide pharmacy voucher for patients co-pays  5.  Patients can then  the prescription on their way out of the hospital at discharge, or pharmacy can deliver to the bedside if staff is available. (payment due at time of pick-up or delivery - cash, check, or card accepted)     Able to afford home medications/ co-pay costs: Yes    ADLS  Support Systems: Children    PT AM-PAC:     OT AM-PAC:       HOUSING  Home Environment: Single Story Home   Steps: None     Plans to RETURN to current housing: Yes  Barriers to RETURNING to current housin [address]  Currently ACTIVE with [address] [address]: No  Home Care Agency: Not Applicable    Currently ACTIVE with [facility] [facility]: No  Passport/ Waiver: No  Passport/ Waiver Services: Not Applicable      Durable Medical Equipment  DME Provider: N/A  Equipment: cane (Only use while out in the community)     Home Oxygen and [address] prior to admission: No  [address]: Not Applicable  Other Respiratory Equipment: N/A    Informed of need to bring portable home O2 tank on day of DISCHARGE for nursing to connect prior to leaving: No  Verbalized agreement/Understanding: No  Person to bring portable tank at discharge: N/A    Dialysis  Active with HD/PD prior to admission: No  Nephrologist: [address]  Not Applicable    DISCHARGE PLAN:  Disposition: Home- No Services Needed    Transportation PLAN for discharge: family     Factors facilitating achievement of predicted outcomes: Family support, Caregiver support, Friend support, Motivated and Cooperative    Barriers to discharge: Medical Clearance     Additional Case Management Notes: Patient from home alone. Patient is independent at baseline at home and uses a cane for support while out in the community. Patient is receiving 2L of O2 via nasal cannula. Following for possible O2 needs at discharge. The Plan for Transition of Care is related to the following treatment goals of Anemia [D64.9]    The Patient and/or patient representative Jeni Bryson and her family were provided with a choice of provider and agrees with the discharge plan Yes    Freedom of choice list was provided with basic dialogue that supports the patient's individualized plan of care/goals and shares the quality data associated with the providers.  Yes    Care Transition patient: No    Lenin Kelly, [address]   Case Management Department  Ph: [phone]   Fax: [phone]

## 2022-02-10 NOTE — PROGRESS NOTES
Progress Note    Admit Date: 2/9/2022  Day: 2  Diet: Diet NPO Exceptions are: Sips of Water with Meds    CC: N/V, CP    Interval history: Overnight, Ms. Sudeep Nunez complained of chest and back pain, associated with SOB. Tn uptrended to 0.78, EKG was taken with what was interpreted as T wave changes, so the patient was placed on low dose heparin drip and was given 2mg morphine x2. Dr. Dipika Rogel aware. CTA showed no aortic dilation, but did reveal bilateral lower lobe basilar PNA and effusinos. Hgb returned 6.8, so 2 units of PRBCs was transfused. Afebrile overnight but tachycardic to above 110's. Today, the patient complains of abdominal pain, no back or chest pain, associated with nausea. Her QTc was 503, so will avoid QTc prolonging medications, gave a GI cocktail instead. Ordered respiratory cultures, viral panel, strep pneumo and legionella antigens, BCx x2. HPI: Ms. Franca Crook is an 78year old female with a PMH of JONATHAN 2/2 chronic blood loss, MDS, systolic CHF, Afib on Eliquis, GIB, glaucoma, HTN who presents as a direct transfer from [facility] ED initially presenting with N/V, CP and abdominal pain. She states her CP, which radiates to between her shoulder blades, started about a week ago and was followed yesterday by nausea with predominant retching that is relieved by vomiting. She states she quit smoking about 20 years ago and that she rarely drinks and denies using narcotics or other drugs. She states that about a year ago she was seen in a hospital in [place] while visiting primarily due to respiratory distress and during that 7-8 day visit, she was diagnosed with porcelain gallbladder, and a \"heart infection\" though she does not know the details.      In the [facility] ED, the patient was noted to be hypoxic to 86-88% on RA and to have 10/10 epigastric pain and nausea; she was given antiemetics and dilaudid.  CT read as heart failure, followed-up with a CXR, and BNP was 3,999; coarse rales on pulmonary auscultation; Tn 0.04, no acute ischemia on EKG. Medications:     Scheduled Meds:   morphine  2 mg IntraVENous Once    GI cocktail   Oral Once    sodium chloride flush  5-40 mL IntraVENous 2 times per day    pantoprazole  40 mg IntraVENous BID    aspirin  324 mg Oral Once    aspirin  81 mg Oral Daily     Continuous Infusions:   sodium chloride      sodium chloride      sodium chloride      heparin (PORCINE) Infusion 12 Units/kg/hr (02/10/22 0120)     PRN Meds:sodium chloride flush, sodium chloride, ondansetron **OR** ondansetron, polyethylene glycol, acetaminophen **OR** acetaminophen, sodium chloride, heparin (porcine), heparin (porcine)    Objective:   Vitals:   T-max:  Patient Vitals for the past 8 hrs:   BP Temp Temp src Pulse Resp SpO2 Weight   02/10/22 0739       145 lb 15.1 oz (66.2 kg)   02/10/22 0541 115/71 97.8 °F (36.6 °C) Oral 110 17 97 %    02/10/22 0400 119/67 97.5 °F (36.4 °C) Oral 112 16 97 %    02/10/22 0335 114/71 97.5 °F (36.4 °C) Oral 111 17 97 %    02/10/22 0150 107/76 98 °F (36.7 °C) Oral 115 21 98 %    02/10/22 0129 109/68 97.5 °F (36.4 °C) Oral 116 17 96 %        Intake/Output Summary (Last 24 hours) at 2/10/2022 0849  Last data filed at 2/10/2022 0556  Gross per 24 hour   Intake 2257.66 ml   Output 200 ml   Net 2057.66 ml       Review of Systems   Constitutional: Positive for chills. Negative for appetite change and fever. HENT: Negative for congestion, hearing loss, rhinorrhea and sore throat. Eyes: Negative for visual disturbance. Respiratory: Negative for cough, chest tightness, shortness of breath and wheezing. Cardiovascular: Negative for chest pain, palpitations and leg swelling. Gastrointestinal: Positive for abdominal pain, nausea and vomiting. Negative for abdominal distention, blood in stool, constipation and diarrhea. Endocrine: Negative for polyuria. Genitourinary: Negative for difficulty urinating and dysuria.    Musculoskeletal: Negative for arthralgias and myalgias. Skin: Negative for color change, pallor and rash. Neurological: Negative for dizziness, seizures, syncope, facial asymmetry, weakness, light-headedness and headaches. Psychiatric/Behavioral: Negative for behavioral problems, confusion and hallucinations. Physical Exam  Constitutional:       General: She is not in acute distress. Appearance: Normal appearance. She is not ill-appearing, toxic-appearing or diaphoretic. HENT:      Head: Normocephalic and atraumatic. Right Ear: External ear normal.      Left Ear: External ear normal.      Nose: Nose normal. No congestion or rhinorrhea. Mouth/Throat:      Mouth: Mucous membranes are moist.      Pharynx: Oropharynx is clear. Eyes:      Extraocular Movements: Extraocular movements intact. Conjunctiva/sclera: Conjunctivae normal.      Pupils: Pupils are equal, round, and reactive to light. Cardiovascular:      Rate and Rhythm: Normal rate and regular rhythm. Pulses: Normal pulses. Heart sounds: Normal heart sounds. No murmur heard. No friction rub. No gallop. Pulmonary:      Effort: Pulmonary effort is normal. No respiratory distress. Breath sounds: No wheezing, rhonchi or rales. Abdominal:      General: Abdomen is flat. Bowel sounds are normal. There is no distension. Palpations: Abdomen is soft. There is no mass. Tenderness: There is no abdominal tenderness. There is no guarding or rebound. Hernia: No hernia is present. Musculoskeletal:         General: No swelling, tenderness, deformity or signs of injury. Normal range of motion. Cervical back: Normal range of motion and neck supple. Right lower leg: No edema. Left lower leg: No edema. Skin:     General: Skin is warm and dry. Capillary Refill: Capillary refill takes less than 2 seconds. Coloration: Skin is not jaundiced or pale. Neurological:      General: No focal deficit present. Mental Status: She is alert and oriented to person, place, and time. Mental status is at baseline. Cranial Nerves: No cranial nerve deficit. Psychiatric:         Mood and Affect: Mood normal.         Behavior: Behavior normal.         Thought Content: Thought content normal.         Judgment: Judgment normal.         LABS:    CBC:   Recent Labs     02/09/22 0525 02/09/22  1423 02/09/22 2005 02/09/22  2356 02/10/22  0430   WBC 7.6  --   --  16.3* 19.4*   HGB 5.5*   < > 7.3* 6.8* 8.5*   HCT 16.7*   < > 22.7* 20.8*  21.0* 25.9*   PLT 59*  --   --  57* 55*   .9*  --   --  109.3* 107.0*    < > = values in this interval not displayed. Renal:    Recent Labs     02/09/22  0525 02/10/22  0430   * 133*   K 4.3 4.4    101   CO2 23 20*   BUN 30* 31*   CREATININE 0.7 0.7   GLUCOSE 184* 183*   CALCIUM 9.0 9.0   ANIONGAP 10 12     Hepatic:   Recent Labs     02/09/22 0525   AST 6*   ALT 6*   BILITOT 0.8   PROT 6.2*   LABALBU 4.1   ALKPHOS 97     Troponin:   Recent Labs     02/09/22  0525 02/09/22  2005 02/10/22  0430   TROPONINI 0.04* 0.78* 1.19*     BNP:     Lactate:       Cultures:     -----------------------------------------------------------------      RAD:   CTA CHEST ABDOMEN PELVIS W CONTRAST   Final Result   1. No evidence of abdominal aortic aneurysm, dissection or intramural hematoma   2. Normal thoracic and abdominal aortic diameter   3. Bilateral lower lobe basilar pneumonia and effusions   4. Colonic diverticulosis   5. Porcelain gallbladder              Assessment/Plan:   Ms. Alissa Noriega is an 78year old female with a PMH of JONATHAN 2/2 chronic blood loss, MDS, systolic CHF, Afib on Eliquis, GIB, glaucoma, HLD and HTN who presents as a direct transfer from [facility] ED initially presenting with N/V, CP and abdominal pain.      Chest pain  N/V  Esophageal strictures  Esophageal strictures revealed on prior recent hospitalization. Likely N/V, CP related to esophageal strictures. Patient complained of CP overnight with rise in Troponin to 0.78, then 1.19.  - Phenergan for nausea  - GI cocktail  - Gentle IVF  - Telemetry  - On heparin low dose drip  - Morphine for CP relief     Afib  On Eliquis at home. - Patient on heparin due to concern for ACS overnight     Macrocytic anemia  Likely blood loss anemia  Myelodysplastic syndrome (MDS)  May be a component of MDS, blood loss and/or vitamin deficiency to her anemia. Patient has a history of GIB and states she bleeds easily with trauma. Patient was given a unit of PRBCs in the OSH due to hgb of 5.5. FOBT was negative at OSH on admission. Immature reticulocyte fraction 0.68.   - 2U PRBCs transfused overnight d/t hgb of 6.8  - Monitor H&H Q6H  - Transfuse for less than 8 hgb  - Peripheral blood smear  - Heme/onc consult, recs appreciated     Abdominal pain/cramping  Patient states her last BM was yesterday and was distressful.   - Glycolax PRN constipation     Pulmonary edema likely 2/2 CHF  As seen on CXR (2/9/22), unlikely to be PNA, patient denies chills/fever, no cough or leukocytosis. Patient appears euvolemic on exam. BNP 3,999. Last echo (9/30/21) with EF 55-60%. - Continue to monitor fluid status     Gastric ulcer  Porcelain gallbladder  EGD in 2012 showing gastric ulcer.  - Protonix 40 BID  - GI consult, recs appreciated     Troponinemia  Tn 0.04 at OSH. Likely NSTEMI in setting of anemia.   - Trend Tn     Chronic issues:  HTN - holding home metoprolol in setting of normo/hypotension  HLD - lipid panel        Code Status: Full code  FEN: NPO for now; NS @75cc/h  PPX: SCDs  DISPO: IP Valentino Hering, MD, PGY-1  02/10/22  8:49 AM    This patient has been staffed and discussed with Maddy Pulliam MD.

## 2022-02-11 ENCOUNTER — TELEPHONE (OUTPATIENT)
Dept: CARDIOLOGY CLINIC | Age: 82
End: 2022-02-11

## 2022-02-11 LAB
ANION GAP SERPL CALCULATED.3IONS-SCNC: 11 MMOL/L (ref 3–16)
ANISOCYTOSIS: ABNORMAL
ANTI-XA UNFRAC HEPARIN: 0.15 IU/ML (ref 0.3–0.7)
ANTI-XA UNFRAC HEPARIN: 0.22 IU/ML (ref 0.3–0.7)
APTT: 47.1 SEC (ref 26.2–38.6)
APTT: 48.5 SEC (ref 26.2–38.6)
APTT: 63.1 SEC (ref 26.2–38.6)
BASOPHILS ABSOLUTE: 0 K/UL (ref 0–0.2)
BASOPHILS RELATIVE PERCENT: 0 %
BUN BLDV-MCNC: 33 MG/DL (ref 7–20)
CALCIUM SERPL-MCNC: 8.9 MG/DL (ref 8.3–10.6)
CHLORIDE BLD-SCNC: 102 MMOL/L (ref 99–110)
CO2: 21 MMOL/L (ref 21–32)
CREAT SERPL-MCNC: 0.7 MG/DL (ref 0.6–1.2)
EOSINOPHILS ABSOLUTE: 0 K/UL (ref 0–0.6)
EOSINOPHILS RELATIVE PERCENT: 0 %
GFR AFRICAN AMERICAN: >60
GFR NON-AFRICAN AMERICAN: >60
GLUCOSE BLD-MCNC: 161 MG/DL (ref 70–99)
HCT VFR BLD CALC: 25.6 % (ref 36–48)
HCT VFR BLD CALC: 26.8 % (ref 36–48)
HEMOGLOBIN: 8.4 G/DL (ref 12–16)
HEMOGLOBIN: 8.9 G/DL (ref 12–16)
HYPERCHROMASIA: ABNORMAL
HYPOCHROMIA: ABNORMAL
L. PNEUMOPHILA SEROGP 1 UR AG: NORMAL
LYMPHOCYTES ABSOLUTE: 0 K/UL (ref 1–5.1)
LYMPHOCYTES RELATIVE PERCENT: 0 %
MACROCYTES: ABNORMAL
MCH RBC QN AUTO: 34.6 PG (ref 26–34)
MCHC RBC AUTO-ENTMCNC: 33 G/DL (ref 31–36)
MCV RBC AUTO: 104.9 FL (ref 80–100)
METAMYELOCYTES RELATIVE PERCENT: 1 %
MICROCYTES: ABNORMAL
MONOCYTES ABSOLUTE: 1 K/UL (ref 0–1.3)
MONOCYTES RELATIVE PERCENT: 5 %
MYELOCYTE PERCENT: 1 %
NEUTROPHILS ABSOLUTE: 19.1 K/UL (ref 1.7–7.7)
NEUTROPHILS RELATIVE PERCENT: 93 %
OVALOCYTES: ABNORMAL
PDW BLD-RTO: 27 % (ref 12.4–15.4)
PLATELET # BLD: 50 K/UL (ref 135–450)
PMV BLD AUTO: 9.4 FL (ref 5–10.5)
POC ACT LR: 205 SEC
POC ACT LR: 257 SEC
POIKILOCYTES: ABNORMAL
POLYCHROMASIA: ABNORMAL
POTASSIUM REFLEX MAGNESIUM: 4.5 MMOL/L (ref 3.5–5.1)
PRO-BNP: ABNORMAL PG/ML (ref 0–449)
RBC # BLD: 2.44 M/UL (ref 4–5.2)
SODIUM BLD-SCNC: 134 MMOL/L (ref 136–145)
STREP PNEUMONIAE ANTIGEN, URINE: NORMAL
TEAR DROP CELLS: ABNORMAL
TROPONIN: 0.96 NG/ML
WBC # BLD: 20.1 K/UL (ref 4–11)

## 2022-02-11 PROCEDURE — 80048 BASIC METABOLIC PNL TOTAL CA: CPT

## 2022-02-11 PROCEDURE — C9600 PERC DRUG-EL COR STENT SING: HCPCS

## 2022-02-11 PROCEDURE — C1760 CLOSURE DEV, VASC: HCPCS

## 2022-02-11 PROCEDURE — 85730 THROMBOPLASTIN TIME PARTIAL: CPT

## 2022-02-11 PROCEDURE — C1769 GUIDE WIRE: HCPCS

## 2022-02-11 PROCEDURE — 99152 MOD SED SAME PHYS/QHP 5/>YRS: CPT | Performed by: INTERNAL MEDICINE

## 2022-02-11 PROCEDURE — 2580000003 HC RX 258: Performed by: STUDENT IN AN ORGANIZED HEALTH CARE EDUCATION/TRAINING PROGRAM

## 2022-02-11 PROCEDURE — 85347 COAGULATION TIME ACTIVATED: CPT

## 2022-02-11 PROCEDURE — 6370000000 HC RX 637 (ALT 250 FOR IP): Performed by: INTERNAL MEDICINE

## 2022-02-11 PROCEDURE — 6360000002 HC RX W HCPCS

## 2022-02-11 PROCEDURE — 6370000000 HC RX 637 (ALT 250 FOR IP)

## 2022-02-11 PROCEDURE — 85520 HEPARIN ASSAY: CPT

## 2022-02-11 PROCEDURE — 99153 MOD SED SAME PHYS/QHP EA: CPT

## 2022-02-11 PROCEDURE — C1874 STENT, COATED/COV W/DEL SYS: HCPCS

## 2022-02-11 PROCEDURE — 80061 LIPID PANEL: CPT

## 2022-02-11 PROCEDURE — 6360000002 HC RX W HCPCS: Performed by: STUDENT IN AN ORGANIZED HEALTH CARE EDUCATION/TRAINING PROGRAM

## 2022-02-11 PROCEDURE — 94761 N-INVAS EAR/PLS OXIMETRY MLT: CPT

## 2022-02-11 PROCEDURE — 99152 MOD SED SAME PHYS/QHP 5/>YRS: CPT

## 2022-02-11 PROCEDURE — 93458 L HRT ARTERY/VENTRICLE ANGIO: CPT | Performed by: INTERNAL MEDICINE

## 2022-02-11 PROCEDURE — 2060000000 HC ICU INTERMEDIATE R&B

## 2022-02-11 PROCEDURE — 2580000003 HC RX 258: Performed by: INTERNAL MEDICINE

## 2022-02-11 PROCEDURE — 85025 COMPLETE CBC W/AUTO DIFF WBC: CPT

## 2022-02-11 PROCEDURE — 027034Z DILATION OF CORONARY ARTERY, ONE ARTERY WITH DRUG-ELUTING INTRALUMINAL DEVICE, PERCUTANEOUS APPROACH: ICD-10-PCS | Performed by: INTERNAL MEDICINE

## 2022-02-11 PROCEDURE — 2700000000 HC OXYGEN THERAPY PER DAY

## 2022-02-11 PROCEDURE — 92928 PRQ TCAT PLMT NTRAC ST 1 LES: CPT | Performed by: INTERNAL MEDICINE

## 2022-02-11 PROCEDURE — 85018 HEMOGLOBIN: CPT

## 2022-02-11 PROCEDURE — 2500000003 HC RX 250 WO HCPCS

## 2022-02-11 PROCEDURE — 83880 ASSAY OF NATRIURETIC PEPTIDE: CPT

## 2022-02-11 PROCEDURE — C1725 CATH, TRANSLUMIN NON-LASER: HCPCS

## 2022-02-11 PROCEDURE — 93005 ELECTROCARDIOGRAM TRACING: CPT | Performed by: INTERNAL MEDICINE

## 2022-02-11 PROCEDURE — 2709999900 HC NON-CHARGEABLE SUPPLY

## 2022-02-11 PROCEDURE — 6360000002 HC RX W HCPCS: Performed by: PHYSICIAN ASSISTANT

## 2022-02-11 PROCEDURE — 2580000003 HC RX 258: Performed by: PHYSICIAN ASSISTANT

## 2022-02-11 PROCEDURE — C9113 INJ PANTOPRAZOLE SODIUM, VIA: HCPCS | Performed by: STUDENT IN AN ORGANIZED HEALTH CARE EDUCATION/TRAINING PROGRAM

## 2022-02-11 PROCEDURE — C1887 CATHETER, GUIDING: HCPCS

## 2022-02-11 PROCEDURE — 84484 ASSAY OF TROPONIN QUANT: CPT

## 2022-02-11 PROCEDURE — 93458 L HRT ARTERY/VENTRICLE ANGIO: CPT

## 2022-02-11 PROCEDURE — 36415 COLL VENOUS BLD VENIPUNCTURE: CPT

## 2022-02-11 PROCEDURE — 6370000000 HC RX 637 (ALT 250 FOR IP): Performed by: PHYSICIAN ASSISTANT

## 2022-02-11 PROCEDURE — B2111ZZ FLUOROSCOPY OF MULTIPLE CORONARY ARTERIES USING LOW OSMOLAR CONTRAST: ICD-10-PCS | Performed by: INTERNAL MEDICINE

## 2022-02-11 PROCEDURE — C1894 INTRO/SHEATH, NON-LASER: HCPCS

## 2022-02-11 PROCEDURE — 85014 HEMATOCRIT: CPT

## 2022-02-11 RX ORDER — FUROSEMIDE 10 MG/ML
20 INJECTION INTRAMUSCULAR; INTRAVENOUS ONCE
Status: DISCONTINUED | OUTPATIENT
Start: 2022-02-12 | End: 2022-02-12

## 2022-02-11 RX ORDER — ACETAMINOPHEN 325 MG/1
650 TABLET ORAL EVERY 4 HOURS PRN
Status: DISCONTINUED | OUTPATIENT
Start: 2022-02-11 | End: 2022-02-15 | Stop reason: HOSPADM

## 2022-02-11 RX ORDER — FUROSEMIDE 10 MG/ML
20 INJECTION INTRAMUSCULAR; INTRAVENOUS ONCE
Status: COMPLETED | OUTPATIENT
Start: 2022-02-11 | End: 2022-02-12

## 2022-02-11 RX ORDER — SODIUM CHLORIDE 0.9 % (FLUSH) 0.9 %
5-40 SYRINGE (ML) INJECTION EVERY 12 HOURS SCHEDULED
Status: DISCONTINUED | OUTPATIENT
Start: 2022-02-11 | End: 2022-02-15 | Stop reason: HOSPADM

## 2022-02-11 RX ORDER — ONDANSETRON 2 MG/ML
4 INJECTION INTRAMUSCULAR; INTRAVENOUS EVERY 6 HOURS PRN
Status: DISCONTINUED | OUTPATIENT
Start: 2022-02-11 | End: 2022-02-15 | Stop reason: HOSPADM

## 2022-02-11 RX ORDER — FUROSEMIDE 10 MG/ML
40 INJECTION INTRAMUSCULAR; INTRAVENOUS ONCE
Status: COMPLETED | OUTPATIENT
Start: 2022-02-11 | End: 2022-02-11

## 2022-02-11 RX ORDER — CLOPIDOGREL BISULFATE 75 MG/1
75 TABLET ORAL DAILY
Status: DISCONTINUED | OUTPATIENT
Start: 2022-02-12 | End: 2022-02-15 | Stop reason: HOSPADM

## 2022-02-11 RX ORDER — MORPHINE SULFATE 2 MG/ML
2 INJECTION, SOLUTION INTRAMUSCULAR; INTRAVENOUS
Status: ACTIVE | OUTPATIENT
Start: 2022-02-11 | End: 2022-02-11

## 2022-02-11 RX ORDER — SODIUM CHLORIDE 0.9 % (FLUSH) 0.9 %
5-40 SYRINGE (ML) INJECTION PRN
Status: DISCONTINUED | OUTPATIENT
Start: 2022-02-11 | End: 2022-02-15 | Stop reason: HOSPADM

## 2022-02-11 RX ORDER — ATORVASTATIN CALCIUM 80 MG/1
80 TABLET, FILM COATED ORAL NIGHTLY
Status: DISCONTINUED | OUTPATIENT
Start: 2022-02-11 | End: 2022-02-15 | Stop reason: HOSPADM

## 2022-02-11 RX ORDER — SODIUM CHLORIDE 9 MG/ML
INJECTION, SOLUTION INTRAVENOUS CONTINUOUS
Status: ACTIVE | OUTPATIENT
Start: 2022-02-11 | End: 2022-02-12

## 2022-02-11 RX ORDER — SODIUM CHLORIDE 9 MG/ML
25 INJECTION, SOLUTION INTRAVENOUS PRN
Status: DISCONTINUED | OUTPATIENT
Start: 2022-02-11 | End: 2022-02-15 | Stop reason: HOSPADM

## 2022-02-11 RX ORDER — ASPIRIN 81 MG/1
81 TABLET, CHEWABLE ORAL DAILY
Status: DISCONTINUED | OUTPATIENT
Start: 2022-02-12 | End: 2022-02-15 | Stop reason: HOSPADM

## 2022-02-11 RX ORDER — ATROPINE SULFATE 0.4 MG/ML
0.5 AMPUL (ML) INJECTION
Status: ACTIVE | OUTPATIENT
Start: 2022-02-11 | End: 2022-02-11

## 2022-02-11 RX ADMIN — NITROGLYCERIN 0.5 INCH: 20 OINTMENT TOPICAL at 06:45

## 2022-02-11 RX ADMIN — PANTOPRAZOLE SODIUM 40 MG: 40 INJECTION, POWDER, FOR SOLUTION INTRAVENOUS at 08:29

## 2022-02-11 RX ADMIN — LIDOCAINE HYDROCHLORIDE: 20 SOLUTION ORAL; TOPICAL at 11:04

## 2022-02-11 RX ADMIN — ONDANSETRON 4 MG: 2 INJECTION INTRAMUSCULAR; INTRAVENOUS at 03:02

## 2022-02-11 RX ADMIN — PANTOPRAZOLE SODIUM 40 MG: 40 INJECTION, POWDER, FOR SOLUTION INTRAVENOUS at 21:19

## 2022-02-11 RX ADMIN — SODIUM CHLORIDE: 9 INJECTION, SOLUTION INTRAVENOUS at 18:26

## 2022-02-11 RX ADMIN — ONDANSETRON 4 MG: 2 INJECTION INTRAMUSCULAR; INTRAVENOUS at 11:51

## 2022-02-11 RX ADMIN — HEPARIN SODIUM 1770 UNITS: 1000 INJECTION INTRAVENOUS; SUBCUTANEOUS at 00:58

## 2022-02-11 RX ADMIN — METOPROLOL TARTRATE 37.5 MG: 25 TABLET, FILM COATED ORAL at 08:29

## 2022-02-11 RX ADMIN — HEPARIN SODIUM 18 UNITS/KG/HR: 5000 INJECTION INTRAVENOUS; SUBCUTANEOUS at 11:52

## 2022-02-11 RX ADMIN — HEPARIN SODIUM 16 UNITS/KG/HR: 5000 INJECTION INTRAVENOUS; SUBCUTANEOUS at 00:59

## 2022-02-11 RX ADMIN — NITROGLYCERIN 0.5 INCH: 20 OINTMENT TOPICAL at 11:06

## 2022-02-11 RX ADMIN — SODIUM CHLORIDE, PRESERVATIVE FREE 10 ML: 5 INJECTION INTRAVENOUS at 00:01

## 2022-02-11 RX ADMIN — SERTRALINE HYDROCHLORIDE 50 MG: 50 TABLET ORAL at 21:19

## 2022-02-11 RX ADMIN — METOPROLOL TARTRATE 37.5 MG: 25 TABLET, FILM COATED ORAL at 21:18

## 2022-02-11 RX ADMIN — ASPIRIN 81 MG: 81 TABLET, CHEWABLE ORAL at 08:29

## 2022-02-11 RX ADMIN — SODIUM CHLORIDE, PRESERVATIVE FREE 10 ML: 5 INJECTION INTRAVENOUS at 08:29

## 2022-02-11 RX ADMIN — ATORVASTATIN CALCIUM 80 MG: 80 TABLET, FILM COATED ORAL at 21:19

## 2022-02-11 RX ADMIN — SODIUM CHLORIDE, PRESERVATIVE FREE 10 ML: 5 INJECTION INTRAVENOUS at 21:20

## 2022-02-11 RX ADMIN — FUROSEMIDE 40 MG: 10 INJECTION, SOLUTION INTRAMUSCULAR; INTRAVENOUS at 14:17

## 2022-02-11 RX ADMIN — HEPARIN SODIUM 16 UNITS/KG/HR: 5000 INJECTION INTRAVENOUS; SUBCUTANEOUS at 08:29

## 2022-02-11 ASSESSMENT — PAIN SCALES - GENERAL
PAINLEVEL_OUTOF10: 0
PAINLEVEL_OUTOF10: 5
PAINLEVEL_OUTOF10: 0
PAINLEVEL_OUTOF10: 0

## 2022-02-11 ASSESSMENT — ENCOUNTER SYMPTOMS
ABDOMINAL DISTENTION: 0
CONSTIPATION: 0
WHEEZING: 0
DIARRHEA: 0
BLOOD IN STOOL: 0
VOMITING: 0
NAUSEA: 1
RHINORRHEA: 0
ABDOMINAL PAIN: 1
COLOR CHANGE: 0
CHEST TIGHTNESS: 0
SHORTNESS OF BREATH: 0
COUGH: 0
SORE THROAT: 0

## 2022-02-11 ASSESSMENT — PAIN DESCRIPTION - PAIN TYPE: TYPE: ACUTE PAIN

## 2022-02-11 ASSESSMENT — PAIN DESCRIPTION - LOCATION: LOCATION: OTHER (COMMENT)

## 2022-02-11 NOTE — PLAN OF CARE
Brief Pre-Op Note/Sedation Assessment      Bess Drummond  1940  [id number]  6:15 PM    Planned Procedure: Cardiac Catheterization Procedure  Post Procedure Plan: Return to same level of care  Consent: I have discussed with the patient and/or the patient representative the indication, alternatives, and the possible risks and/or complications of the planned procedure and the anesthesia methods. The patient and/or patient representative appear to understand and agree to proceed. Chief Complaint:   Chest Pain/Pressure  Anginal Equivalent  NSTEMI  Dyspnea      Indications for Cath Procedure:  1. Presentation:  ACS > 24 hrs, Worsening Angina, Suspected CAD and LV Dysfunction  2. Anginal Classification within 2 weeks:  CCS III - Symptoms with everyday living activities, i.e., moderate limitation  3. Angina Symptoms Assessment:  Typical Chest Pain  4. Heart Failure Class within last 2 weeks:  Yes:  Heart Failure Type: Unknown Severity:  Class III - Symptoms of HF on less-than-ordinary exertion  5. Cardiovascular Instability:  No    Prior Ischemic Workup/Eval:  1. Pre-Procedural Medications: Yes: Aspirin, Beta Blockers and STATIN  2. Stress Test Completed? No    Does Patient need surgery? Cath Valve Surgery:  No    Pre-Procedure Medical History:  Vital Signs:  BP 99/69   Pulse 93   Temp 98.5 °F (36.9 °C) (Oral)   Resp 17   Ht 5' 1\" (1.549 m)   Wt 156 lb 15.5 oz (71.2 kg)   SpO2 98%   BMI 29.66 kg/m²     Allergies:     Allergies   Allergen Reactions    Oxycodone      Medications:    Current Facility-Administered Medications   Medication Dose Route Frequency Provider Last Rate Last Admin    perflutren lipid microspheres (DEFINITY) injection 1.65 mg  1.5 mL IntraVENous ONCE PRN Yury Malik MD        sertraline (ZOLOFT) tablet 50 mg  50 mg Oral Daily Yury Malik MD        metoprolol tartrate (LOPRESSOR) tablet 37.5 mg  37.5 mg Oral BID Grazyna Hayes MD   37.5 mg at 02/11/22 0845    nitroglycerin (NITRO-BID) 2 % ointment 0.5 inch  0.5 inch Topical 4 times per day Robert Guzman MD   0.5 inch at 02/11/22 1106    sodium chloride flush 0.9 % injection 5-40 mL  5-40 mL IntraVENous 2 times per day Hugo Arriaza MD   10 mL at 02/11/22 0829    sodium chloride flush 0.9 % injection 5-40 mL  5-40 mL IntraVENous PRN Hugo Arriaza MD        0.9 % sodium chloride infusion  25 mL IntraVENous PRN Hugo Arriaza MD        ondansetron (ZOFRAN-ODT) disintegrating tablet 4 mg  4 mg Oral Q8H PRN Hugo Arriaza MD   4 mg at 02/10/22 1801    Or    ondansetron (ZOFRAN) injection 4 mg  4 mg IntraVENous Q6H PRN Hugo Arriaza MD   4 mg at 02/11/22 1151    polyethylene glycol (GLYCOLAX) packet 17 g  17 g Oral Daily PRN Hugo Arriaza MD        acetaminophen (TYLENOL) tablet 650 mg  650 mg Oral Q6H PRN Hugo Arriaza MD        Or    acetaminophen (TYLENOL) suppository 650 mg  650 mg Rectal Q6H PRN Hugo Arriaza MD        pantoprazole (PROTONIX) injection 40 mg  40 mg IntraVENous BID Hugo Arriaza MD   40 mg at 02/11/22 0829    0.9 % sodium chloride infusion   IntraVENous PRN Patricia Cruz MD        heparin (porcine) injection 3,540 Units  60 Units/kg IntraVENous PRN Patricia Cruz MD        heparin (porcine) injection 1,770 Units  30 Units/kg IntraVENous PRN Patricia Cruz MD   1,770 Units at 02/11/22 0058    heparin 25,000 units in dextrose 5% 250 mL (premix) infusion  12 Units/kg/hr IntraVENous Continuous Patricia Cruz MD   Stopped at 02/11/22 1550    aspirin chewable tablet 81 mg  81 mg Oral Daily Patricia Cruz MD   81 mg at 02/11/22 3875       Past Medical History:    Past Medical History:   Diagnosis Date    Allergic rhinitis     [facility]     bre[facility]    GI bleed     Upper GI Bleed with knee surgery 2012    Glaucoma     bilateral    Hyperlipidemia     Hypertension     Myelodysplastic syndrome ([facility]     Radiation therapy complication 5889    UTI (lower urinary tract infection) Surgical History:    Past Surgical History:   Procedure Laterality Date    BREAST SURGERY  1999    lumpectomy right    CATARACT REMOVAL WITH IMPLANT      bilateral    HYSTERECTOMY      age 34    KNEE SURGERY  2/6/2012    left side             Pre-Sedation:  Pre-Sedation Documentation and Exam:  I have personally completed a history, physical exam & review of systems for this patient (see notes). Prior History of Anesthesia Complications:   none    Modified Mallampati:  II (soft palate, uvula, fauces visible)    ASA Classification:  Class 3 - A patient with severe systemic disease that limits activity but is not incapacitating    Demi Scale: Activity:  2 - Able to move 4 extremities voluntarily on command  Respiration:  2 - Able to breathe deeply and cough freely  Circulation:  1 - BP+/- 20-50mmHg of normal  Consciousness:  2 - Fully awake  Oxygen Saturation (color):  1 - Needs oxygen to maintain oxygen saturation >90%    Sedation/Anesthesia Plan:  Guard the patient's safety and welfare. Minimize physical discomfort and pain. Minimize negative psychological responses to treatment by providing sedation and analgesia and maximize the potential amnesia. Patient to meet pre-procedure discharge plan.     Medication Planned:  midazolam intravenously and fentanyl intravenously    Patient is an appropriate candidate for plan of sedation:   yes      Electronically signed by Coni Valdez MD on 2/11/2022 at 6:15 PM

## 2022-02-11 NOTE — PROCEDURES
Patient's had worsening heart failure with nausea chest discomfort and for the past 2 days has had an elevated troponin with a peak is 1.2. The patient has myelodysplastic syndrome with low platelets and a probable GI bleed with a hemoglobin that went to 5.5 on Eliquis she. She is presently in normal sinus rhythm. Platelet count is 57,985 today with a hemoglobin of 8.4 today after transfusion of several units of packed red blood cells down from 9.5 at the peak hemoglobin yesterday. Because the patient is in distress and her BNP is climbed to 24,000 with the elevated troponin with nausea and chest pain there is concern that she has severe CAD that needs immediate attention. She has been maintained on a heparin drip but her labs are as stated above. Coronary angiography findings:    Start time: 4 PM  End time 5:15 PM  Versed 0.5 mg IV push x2 and fentanyl 25 mcg IV push x1. These drugs were ordered by me and given by the qualified nurse observer Lexi Gutierrez RN and the patient's cardio pulmonary status was monitored by myself personally and the cardiac Cath Lab team the entire procedure.     Left main normal.  LAD mid vessel 70% diffuse disease after second diagonal branch. MABEL grade III flow present. Second diagonal branch ostial 99% stenosis mid vessel 99% stenosis diffuse disease in between. Right coronary artery collateralized distal right coronary occluded  Circumflex and obtuse marginal branches with diffuse moderate disease. LVEDP 20 mmHg LVEF estimated at 40% with diffuse hypokinesia more pronounced in the anterior apex.     Because of her deteriorating condition and severe coronary artery disease an attempt to perform salvage angioplasty in this patient with myelodysplastic syndrome low hemoglobin and low platelets we embarked on angioplasty.     5 Argentine sheath was upsized to a 6 Western Sary sheath JL4 guide catheter 6 Western Sary engage left main coronary artery.   Balanced heavy weight wire was advanced into

## 2022-02-11 NOTE — PROGRESS NOTES
Cardiology intervention note:    Patient's had worsening heart failure with nausea chest discomfort and for the past 2 days has had an elevated troponin with a peak is 1.2. The patient has myelodysplastic syndrome with low platelets and a probable GI bleed with a hemoglobin that went to 5.5 on Eliquis she. She is presently in normal sinus rhythm. Platelet count is 65,483 today with a hemoglobin of 8.4 today after transfusion of several units of packed red blood cells down from 9.5 at the peak hemoglobin yesterday. Because the patient is in distress and her BNP is climbed to 24,000 with the elevated troponin with nausea and chest pain there is concern that she has severe CAD that needs immediate attention. She has been maintained on a heparin drip but her labs are as stated above. Cath findings:    Left main normal.  LAD mid vessel 70% diffuse disease after second diagonal branch. MABEL grade III flow present. Second diagonal branch ostial 99% stenosis mid vessel 99% stenosis diffuse disease in between. Right coronary artery collateralized distal right coronary arteries occluded  Circumflex and obtuse marginal branches with diffuse moderate disease. LVEDP 20 mmHg LVEF estimated at 40% with diffuse hypokinesia more pronounced in the anterior apex. Because of her deteriorating condition and severe coronary artery disease an attempt to perform salvage angioplasty in this patient with myelodysplastic syndrome low hemoglobin and low platelets we embarked on angioplasty. 5 Amharic sheath was upsized to a 6 Western Sary sheath JL4 guide catheter 6 Western Sary engage left main coronary artery. Balanced heavy weight wire was advanced into the diagonal branch PTCA with 2.0 balloon ensued was able to place a 2.5 mm x 18 mm length Denis resolute drug-eluting stent into the diagonal branch covering most of the ostium and the mid vessel high-grade stenosis of 99%. PTCA ensued in the ostium of the diagonal branch.   There is a less than 20% residual ostial stenosis. Nitroglycerin 250 mcg was instilled into the coronary guide. An attempt was made to primarily stent the mid LAD but this is a long area of disease that is 70% and is heavily calcified and this had to be abandoned because of contrast load and time on table. Conclusion: Successful PTCA and stenting of diagonal branch #2 with 99% stenosis reduced to 0 and ostial stenosis with from 90 to 20% residual.  Plan:  hydration Lasix bedrest for tonight. Resume activity 8 AM on February 12, 2022. Watch platelets and hemoglobin in this patient with GI bleeding and myelodysplastic syndrome. Hold Eliquis and continue Plavix and aspirin 81 mg daily.

## 2022-02-11 NOTE — PROGRESS NOTES
pts personal belongings left in room 5457. Did let the unit secretary know they were dropped off, nurse was not available.

## 2022-02-11 NOTE — PROGRESS NOTES
[address] [place] [address]  GI Progress Note          Monty Arevalo is a 78 y.o. female patient. No diagnosis found. Admit Date: 2022    Subjective:       No rectal bleeding melena abd pain emesis, continues with nausea    Scheduled Meds:   metoprolol tartrate  37.5 mg Oral BID    nitroglycerin  0.5 inch Topical 4 times per day    sodium chloride flush  5-40 mL IntraVENous 2 times per day    pantoprazole  40 mg IntraVENous BID    aspirin  324 mg Oral Once    aspirin  81 mg Oral Daily     Continuous Infusions:   sodium chloride 75 mL/hr at 02/10/22 1324    sodium chloride      sodium chloride      heparin (PORCINE) Infusion 16 Units/kg/hr (22 0059)       Objective:       Patient Vitals for the past 24 hrs:   BP Temp Temp src Pulse Resp SpO2   22 0800 (!) 105/59 98.3 °F (36.8 °C) Oral 100 24 96 %   22 0307 101/60 98.4 °F (36.9 °C) Oral 94 16 94 %   02/10/22 2346 93/61 97.6 °F (36.4 °C) Oral 88 19 96 %   02/10/22 2043 101/65 98.1 °F (36.7 °C) Oral 99 19 93 %   02/10/22 1505 101/65 97.6 °F (36.4 °C) Oral 101 16 96 %   02/10/22 1436 106/67   102     02/10/22 1322 (!) 97/57 98.5 °F (36.9 °C) Oral 101 14    02/10/22 1118 115/62   114     02/10/22 0830  97.8 °F (36.6 °C) Oral          Exam:  VITALS:  BP (!) 105/59   Pulse 100   Temp 98.3 °F (36.8 °C) (Oral)   Resp 24   Ht 5' 1\" (1.549 m)   Wt 145 lb 15.1 oz (66.2 kg)   SpO2 96%   BMI 27.58 kg/m²   TEMPERATURE:  Current - Temp: 98.3 °F (36.8 °C);  Max - Temp  Av °F (36.7 °C)  Min: 97.6 °F (36.4 °C)  Max: 98.5 °F (36.9 °C)    NAD  General appearance: alert, appears stated age, cooperative   Abdomen: soft, non-tender; bowel sounds normal; no masses,  no organomegaly  AAOx3, No asterixis or encephalopathy      Recent Labs     22  2356 22  2356 02/10/22  0430 02/10/22  0922 02/10/22  1410 22  0013 22  0416   WBC 16.3*  --  19.4*  --   --   --  20.1*   HGB 6.8*   < > 8.5*   < > 9.1* 8.9* 8.4*   HCT 20.8*  21.0*   < > 25.9*   < > 27.7*  whole blood 26.8* 25.6*   .3*  --  107.0*  --   --   --  104.9*   PLT 57*  --  55*  --   --   --  50*    < > = values in this interval not displayed. Recent Labs     02/09/22  0525 02/10/22  0430 02/11/22  0416   * 133* 134*   K 4.3 4.4 4.5    101 102   CO2 23 20* 21   BUN 30* 31* 33*   CREATININE 0.7 0.7 0.7     Recent Labs     02/09/22  0525   AST 6*   ALT 6*   BILITOT 0.8   ALKPHOS 97     Recent Labs     02/09/22  0525   LIPASE 16.0         Assessment:       Active Problems:    Symptomatic anemia    Chest pain  Resolved Problems:    * No resolved hospital problems.  *  Nausea  NSTEMI  Porcelain gallbladder     No signs of active GI bleeding  Severe anemia in setting of myelofibrosis, possible component of GI bleed but not active at present    Recommendations:       BID PPI  Antiemetics  Hold on EGD given NSTEMI, lack of active GI bleeding (heme negative stool, no clinical bleeding) therefore would consider this semi elective unless active GI bleeding    Gianfranco Pacheco MD  2/11/2022  8:28 AM

## 2022-02-11 NOTE — PLAN OF CARE
Problem: Pain:  Goal: Pain level will decrease  Description: Pain level will decrease  Outcome: Ongoing  Note: Pt with no complaints of pain during this shift. Will continue to monitor. Problem: Falls - Risk of:  Goal: Will remain free from falls  Description: Will remain free from falls  Outcome: Ongoing  Note: Orthostatic vital signs obtained at start of shift - see flowsheet for details. Pt does not meet criteria for orthostasis. Pt is a High fall risk. See Michelle Akhtar Fall Score and ABCDS Injury Risk assessments.   + Screening for Orthostasis and/or + High Fall Risk per SLAUGHTER/ABCDS: Explained fall risk precautions to pt and family and rationale behind their use to keep the patient safe. Pt bed is in low position, side rails up, call light and belongings are in reach. Fall wristband applied and present on pts wrist.  Bed alarm on. Pt encouraged to call for assistance. Will continue with hourly rounds for PO intake, pain needs, toileting and repositioning as needed. Problem: OXYGENATION/RESPIRATORY FUNCTION  Goal: Patient will maintain patent airway  Outcome: Ongoing  Note: Pt continues to need 3-4 L of O2 to maintain Sats >90. Will continue to monitor and titrate as appropriate. Problem: FLUID AND ELECTROLYTE IMBALANCE  Goal: Fluid and electrolyte balance are achieved/maintained  Outcome: Ongoing     Problem: Bleeding:  Goal: Will show no signs and symptoms of excessive bleeding  Description: Will show no signs and symptoms of excessive bleeding  Outcome: Ongoing  Note: Patient's hemoglobin this AM:   Recent Labs     02/11/22 0416   HGB 8.4*     Patient's platelet count this AM:   Recent Labs     02/11/22 0416   PLT 50*    Thrombocytopenia Precautions in place. Patient showing no signs or symptoms of active bleeding. Transfusion not indicated at this time. Patient verbalizes understanding of all instructions. Will continue to assess and implement POC.  Call light within reach and hourly rounding in place.        Problem: Skin Integrity:  Goal: Will show no infection signs and symptoms  Description: Will show no infection signs and symptoms  Outcome: Ongoing

## 2022-02-11 NOTE — TELEPHONE ENCOUNTER
Reviewed documentation. Cardiology consulted for evaluation. My partner will see. I will re-evaluate upon discharge on OP basis. pls let her know I reviewed and thank her for updating me. Wll follow her course.      EVANGELIST

## 2022-02-11 NOTE — PROGRESS NOTES
[facility] Progress Note    2022     Montse Ham    MRN: [id number]    : 1940    Referring MD: Saadia Harper MD  [address]      SUBJECTIVE:  No events overnight. Has some nausea but ate breakfast and no vomiting. Denies any further chest pain         Isolation: None    Medications    Scheduled Meds:   metoprolol tartrate  37.5 mg Oral BID    nitroglycerin  0.5 inch Topical 4 times per day    sodium chloride flush  5-40 mL IntraVENous 2 times per day    pantoprazole  40 mg IntraVENous BID    aspirin  324 mg Oral Once    aspirin  81 mg Oral Daily     Continuous Infusions:   sodium chloride 75 mL/hr at 02/10/22 1324    sodium chloride      sodium chloride      heparin (PORCINE) Infusion 16 Units/kg/hr (22 0829)     PRN Meds:.sodium chloride flush, sodium chloride, ondansetron **OR** ondansetron, polyethylene glycol, acetaminophen **OR** acetaminophen, sodium chloride, heparin (porcine), heparin (porcine)    ROS:  As noted above, otherwise remainder of 10-point ROS negative    Physical Exam:     I&O:      Intake/Output Summary (Last 24 hours) at 2022 0929  Last data filed at 2022 4351  Gross per 24 hour   Intake 2722 ml   Output 600 ml   Net 2122 ml       Vital Signs:  BP (!) 105/59   Pulse 100   Temp 98.3 °F (36.8 °C) (Oral)   Resp 24   Ht 5' 1\" (1.549 m)   Wt 156 lb 15.5 oz (71.2 kg)   SpO2 96%   BMI 29.66 kg/m²     Weight:    Wt Readings from Last 3 Encounters:   22 156 lb 15.5 oz (71.2 kg)   22 130 lb (59 kg)   22 130 lb (59 kg)         General: Awake, alert and oriented.   HEENT: normocephalic, PERRL, no scleral erythema or icterus, Oral mucosa moist and intact, throat clear  NECK: supple without palpable adenopathy  BACK: Straight negative CVAT  SKIN: warm dry and intact without lesions rashes or masses  CHEST: CTA bilaterally without use of accessory muscles  CV: Normal S1 S2, RRR, no MRG  ABD: NT ND NSTEMI  - GI and cardiology consulted   with plts around 46s, and patient feeling better agree with monitoring symptoms and H and H        Thank you for this consult, please call with any questions         Edyth Mail, DO

## 2022-02-11 NOTE — PROGRESS NOTES
Progress Note    Admit Date: 2/9/2022  Day: 3  Diet: ADULT DIET; Regular; Low Fat/Low Chol/High Fiber/RENATO    CC: N/V, CP    Interval history: NAEON. Ms. Gaines Aid hgb went from 8.9 -> 8.4 overnight. Today, the patient complains of abdominal discomfort, no back or chest pain, associated with nausea without vomiting. She states she feels as though she needs to belch or defecate. She is unsure whether or not the GI cocktail I gave her yesterday alleviated her discomfort; I ordered a GI cocktail ordered, asked patient to keep track of her symptoms. Legionella presumptive negative, BCx, resp Cx pending. HPI: Ms. Kirby Stafford is an 78year old female with a PMH of JONATHAN 2/2 chronic blood loss, MDS, systolic CHF, Afib on Eliquis, GIB, glaucoma, HTN who presents as a direct transfer from [facility] ED initially presenting with N/V, CP and abdominal pain. She states her CP, which radiates to between her shoulder blades, started about a week ago and was followed yesterday by nausea with predominant retching that is relieved by vomiting. She states she quit smoking about 20 years ago and that she rarely drinks and denies using narcotics or other drugs. She states that about a year ago she was seen in a hospital in [place] while visiting primarily due to respiratory distress and during that 7-8 day visit, she was diagnosed with porcelain gallbladder, and a \"heart infection\" though she does not know the details.      In the [facility] ED, the patient was noted to be hypoxic to 86-88% on RA and to have 10/10 epigastric pain and nausea; she was given antiemetics and dilaudid. CT read as heart failure, followed-up with a CXR, and BNP was 3,999; coarse rales on pulmonary auscultation; Tn 0.04, no acute ischemia on EKG.     Medications:     Scheduled Meds:   GI cocktail   Oral Once    metoprolol tartrate  37.5 mg Oral BID    nitroglycerin  0.5 inch Topical 4 times per day    sodium chloride flush  5-40 mL IntraVENous 2 times per day    pantoprazole  40 mg IntraVENous BID    aspirin  324 mg Oral Once    aspirin  81 mg Oral Daily     Continuous Infusions:   sodium chloride 75 mL/hr at 02/10/22 1324    sodium chloride      sodium chloride      heparin (PORCINE) Infusion 16 Units/kg/hr (02/11/22 0829)     PRN Meds:sodium chloride flush, sodium chloride, ondansetron **OR** ondansetron, polyethylene glycol, acetaminophen **OR** acetaminophen, sodium chloride, heparin (porcine), heparin (porcine)    Objective:   Vitals:   T-max:  Patient Vitals for the past 8 hrs:   BP Temp Temp src Pulse Resp SpO2 Weight   02/11/22 0919       156 lb 15.5 oz (71.2 kg)   02/11/22 0800 (!) 105/59 98.3 °F (36.8 °C) Oral 100 24 96 %    02/11/22 0307 101/60 98.4 °F (36.9 °C) Oral 94 16 94 %        Intake/Output Summary (Last 24 hours) at 2/11/2022 1014  Last data filed at 2/11/2022 9186  Gross per 24 hour   Intake 2722 ml   Output 600 ml   Net 2122 ml       Review of Systems   Constitutional: Negative for appetite change, chills and fever. HENT: Negative for congestion, hearing loss, rhinorrhea and sore throat. Eyes: Negative for visual disturbance. Respiratory: Negative for cough, chest tightness, shortness of breath and wheezing. Cardiovascular: Negative for chest pain, palpitations and leg swelling. Gastrointestinal: Positive for abdominal pain and nausea. Negative for abdominal distention, blood in stool, constipation, diarrhea and vomiting. Endocrine: Negative for polyuria. Genitourinary: Negative for difficulty urinating and dysuria. Musculoskeletal: Negative for arthralgias and myalgias. Skin: Negative for color change, pallor and rash. Neurological: Negative for dizziness, seizures, syncope, facial asymmetry, weakness, light-headedness and headaches. Psychiatric/Behavioral: Negative for behavioral problems, confusion and hallucinations. Physical Exam  Constitutional:       General: She is not in acute distress. Appearance: Normal appearance. She is not ill-appearing, toxic-appearing or diaphoretic. HENT:      Head: Normocephalic and atraumatic. Right Ear: External ear normal.      Left Ear: External ear normal.      Nose: Nose normal. No congestion or rhinorrhea. Mouth/Throat:      Mouth: Mucous membranes are moist.      Pharynx: Oropharynx is clear. Eyes:      Extraocular Movements: Extraocular movements intact. Conjunctiva/sclera: Conjunctivae normal.      Pupils: Pupils are equal, round, and reactive to light. Cardiovascular:      Rate and Rhythm: Normal rate and regular rhythm. Pulses: Normal pulses. Heart sounds: Normal heart sounds. No murmur heard. No friction rub. No gallop. Pulmonary:      Effort: Pulmonary effort is normal. No respiratory distress. Breath sounds: No wheezing, rhonchi or rales. Abdominal:      General: Abdomen is flat. Bowel sounds are normal. There is no distension. Palpations: Abdomen is soft. There is no mass. Tenderness: There is abdominal tenderness (right and mid-epigastric). There is no guarding or rebound. Hernia: No hernia is present. Musculoskeletal:         General: No swelling, tenderness, deformity or signs of injury. Normal range of motion. Cervical back: Normal range of motion and neck supple. Right lower leg: No edema. Left lower leg: No edema. Skin:     General: Skin is warm and dry. Capillary Refill: Capillary refill takes less than 2 seconds. Coloration: Skin is not jaundiced or pale. Neurological:      General: No focal deficit present. Mental Status: She is alert and oriented to person, place, and time. Mental status is at baseline. Cranial Nerves: No cranial nerve deficit. Psychiatric:         Mood and Affect: Mood normal.         Behavior: Behavior normal.         Thought Content:  Thought content normal.         Judgment: Judgment normal.         LABS:    CBC:   Recent Labs 02/09/22  2356 02/09/22  2356 02/10/22  0430 02/10/22  0922 02/10/22  1410 02/11/22  0013 02/11/22  0416   WBC 16.3*  --  19.4*  --   --   --  20.1*   HGB 6.8*   < > 8.5*   < > 9.1* 8.9* 8.4*   HCT 20.8*  21.0*   < > 25.9*   < > 27.7*  whole blood 26.8* 25.6*   PLT 57*  --  55*  --   --   --  50*   .3*  --  107.0*  --   --   --  104.9*    < > = values in this interval not displayed. Renal:    Recent Labs     02/09/22  0525 02/10/22  0430 02/11/22  0416   * 133* 134*   K 4.3 4.4 4.5    101 102   CO2 23 20* 21   BUN 30* 31* 33*   CREATININE 0.7 0.7 0.7   GLUCOSE 184* 183* 161*   CALCIUM 9.0 9.0 8.9   ANIONGAP 10 12 11     Hepatic:   Recent Labs     02/09/22 0525   AST 6*   ALT 6*   BILITOT 0.8   PROT 6.2*   LABALBU 4.1   ALKPHOS 97     Troponin:   Recent Labs     02/10/22  0430 02/10/22  1410 02/11/22  0416   TROPONINI 1.19* 1.21* 0.96*     BNP (2/9/22): 3,999    Lactate (2/9/22): 1.7      Cultures: legionella presumptive negative, BCx x2 pending, respiratory Cx pending.    -----------------------------------------------------------------      RAD:   CTA CHEST ABDOMEN PELVIS W CONTRAST   Final Result   1. No evidence of abdominal aortic aneurysm, dissection or intramural hematoma   2. Normal thoracic and abdominal aortic diameter   3. Bilateral lower lobe basilar pneumonia and effusions   4. Colonic diverticulosis   5. Porcelain gallbladder              Assessment/Plan:   Ms. Duc Rodrigez is an 78year old female with a PMH of JONATHAN 2/2 chronic blood loss, MDS, systolic CHF, Afib on Eliquis, GIB, glaucoma, HLD and HTN who presents as a direct transfer from [facility] ED initially presenting with N/V, CP and abdominal pain.      Chest pain  N/V  Abdominal pain/cramping  History of esophageal spasm  Esophageal spasm on prior recent hospitalization. Likely N/V, CP related to this.   - Phenergan for nausea  - GI cocktail today  - Gentle IVF  - Telemetry  - On heparin low dose drip  - Morphine for CP relief  - GI consulted, recs appreciated  - Semi-elective EGD, hold off for now given NSTEMI & lack of active GIB per GI  - Topical nitrates for CP per cardio  - May require LHC at some point     Afib  On Eliquis at home. - Patient on heparin due to NSTEMI, consider discontinuing today  - Metoprolol 37.5mg BID     Macrocytic anemia  Myelodysplastic syndrome (MDS)  Thrombocytopenia  May be a component of MDS, blood loss and/or vitamin deficiency to her anemia. Patient has a history of GIB and states she bleeds easily with trauma. Patient was given a unit of PRBCs in the OSH due to hgb of 5.5. FOBT was negative at OSH on admission. Immature reticulocyte fraction 0.68. Hgb 8.4 this AM. No schistocytes on peripheral blood smear (PBS). Plt count 50 this AM. Occasional poikilocytes, polychromasia, tear drop cells, anisocytosis, hyperchromasia, ovalocytes, macrocytes and microcytes on PBS with 1+ hypochromia & 1% myelocytes & relative metamyelocytes. - Monitor H&H Q8H  - Transfuse for less than 8 hgb  - Heme/onc consult, recs appreciated  - Stop Hydrea for now per oncology  - Bone marrow bx outpt per oncology     Pulmonary edema likely 2/2 CHF  As seen on CXR (2/9/22), unlikely to be PNA, patient denies chills/fever, no cough or leukocytosis. Patient appears euvolemic on exam. BNP 3,999. Last echo (9/30/21) with EF 55-60%. - Continue to monitor fluid status     Gastric ulcer  Porcelain gallbladder  EGD in 2012 showing gastric ulcer.  - Protonix 40 BID  - GI consult, recs appreciated     NSTEMI  Tn 0.04 at OSH, uptrended to 1.27 before downtrending to 0.96.  Likely demand ischemia in setting of anemia.  - Monitor for signs/symptoms of ACS  - Los-dose heparin gtt, may consider discontinuing today  - May require antiplatelet therapy but given history of MDS and low plt count, caution should be used  - Continuous telemetry     Chronic issues:  HTN - home metoprolol  HLD - Total chol 110, HDL 38, LDL 53, VLDL 19, TG 96        Code Status: Full code  FEN: adult regular diet, low fat/chol/high fiber/RENATO  PPX: on low-dose heparin gtt  DISPO: IP      Scar Castanon MD, PGY-1  02/11/22  10:14 AM    This patient has been staffed and discussed with Dana Herndon MD.

## 2022-02-11 NOTE — PROGRESS NOTES
Here to get pt for cath. Pt will not return to 3 bcc she will go to the pcu following the cath. cathlab staff disconnected pt from heparin drip and pt gone to cath lab via bed.

## 2022-02-11 NOTE — PROGRESS NOTES
Report called to PCU, spoke with Shimon. All pts personal belongings were packed and sent to room 4323.

## 2022-02-12 LAB
ANION GAP SERPL CALCULATED.3IONS-SCNC: 9 MMOL/L (ref 3–16)
APTT: 38.4 SEC (ref 26.2–38.6)
APTT: 39 SEC (ref 26.2–38.6)
APTT: 40.6 SEC (ref 26.2–38.6)
BASOPHILS ABSOLUTE: 0 K/UL (ref 0–0.2)
BASOPHILS RELATIVE PERCENT: 0.3 %
BLOOD BANK DISPENSE STATUS: NORMAL
BLOOD BANK PRODUCT CODE: NORMAL
BPU ID: NORMAL
BUN BLDV-MCNC: 26 MG/DL (ref 7–20)
CALCIUM SERPL-MCNC: 8.5 MG/DL (ref 8.3–10.6)
CHLORIDE BLD-SCNC: 100 MMOL/L (ref 99–110)
CHOLESTEROL, TOTAL: 92 MG/DL (ref 0–199)
CO2: 27 MMOL/L (ref 21–32)
CREAT SERPL-MCNC: 0.7 MG/DL (ref 0.6–1.2)
CULTURE, RESPIRATORY: NORMAL
DESCRIPTION BLOOD BANK: NORMAL
EKG ATRIAL RATE: 109 BPM
EKG ATRIAL RATE: 113 BPM
EKG ATRIAL RATE: 95 BPM
EKG ATRIAL RATE: 97 BPM
EKG DIAGNOSIS: NORMAL
EKG P AXIS: 41 DEGREES
EKG P AXIS: 41 DEGREES
EKG P AXIS: 69 DEGREES
EKG P AXIS: 74 DEGREES
EKG P-R INTERVAL: 138 MS
EKG P-R INTERVAL: 142 MS
EKG P-R INTERVAL: 146 MS
EKG P-R INTERVAL: 174 MS
EKG Q-T INTERVAL: 328 MS
EKG Q-T INTERVAL: 374 MS
EKG Q-T INTERVAL: 380 MS
EKG Q-T INTERVAL: 402 MS
EKG QRS DURATION: 72 MS
EKG QRS DURATION: 84 MS
EKG QRS DURATION: 90 MS
EKG QRS DURATION: 96 MS
EKG QTC CALCULATION (BAZETT): 449 MS
EKG QTC CALCULATION (BAZETT): 477 MS
EKG QTC CALCULATION (BAZETT): 503 MS
EKG QTC CALCULATION (BAZETT): 510 MS
EKG R AXIS: 68 DEGREES
EKG R AXIS: 75 DEGREES
EKG R AXIS: 84 DEGREES
EKG R AXIS: 95 DEGREES
EKG T AXIS: -48 DEGREES
EKG T AXIS: 109 DEGREES
EKG T AXIS: 221 DEGREES
EKG T AXIS: 94 DEGREES
EKG VENTRICULAR RATE: 109 BPM
EKG VENTRICULAR RATE: 113 BPM
EKG VENTRICULAR RATE: 95 BPM
EKG VENTRICULAR RATE: 97 BPM
EOSINOPHILS ABSOLUTE: 0.1 K/UL (ref 0–0.6)
EOSINOPHILS RELATIVE PERCENT: 0.8 %
GFR AFRICAN AMERICAN: >60
GFR NON-AFRICAN AMERICAN: >60
GLUCOSE BLD-MCNC: 124 MG/DL (ref 70–99)
GRAM STAIN RESULT: NORMAL
HCT VFR BLD CALC: 23.7 % (ref 36–48)
HCT VFR BLD CALC: 23.8 % (ref 36–48)
HCT VFR BLD CALC: 26.7 % (ref 36–48)
HCT VFR BLD CALC: 27 % (ref 36–48)
HCT VFR BLD CALC: 27.6 % (ref 36–48)
HDLC SERPL-MCNC: 34 MG/DL (ref 40–60)
HEMOGLOBIN: 7.7 G/DL (ref 12–16)
HEMOGLOBIN: 7.8 G/DL (ref 12–16)
HEMOGLOBIN: 8.9 G/DL (ref 12–16)
HEMOGLOBIN: 8.9 G/DL (ref 12–16)
HEMOGLOBIN: 9.1 G/DL (ref 12–16)
LDL CHOLESTEROL CALCULATED: 32 MG/DL
LYMPHOCYTES ABSOLUTE: 0.7 K/UL (ref 1–5.1)
LYMPHOCYTES RELATIVE PERCENT: 5.9 %
MAGNESIUM: 1.6 MG/DL (ref 1.8–2.4)
MCH RBC QN AUTO: 34 PG (ref 26–34)
MCH RBC QN AUTO: 34.1 PG (ref 26–34)
MCHC RBC AUTO-ENTMCNC: 32.3 G/DL (ref 31–36)
MCHC RBC AUTO-ENTMCNC: 32.9 G/DL (ref 31–36)
MCV RBC AUTO: 103.3 FL (ref 80–100)
MCV RBC AUTO: 105.5 FL (ref 80–100)
MONOCYTES ABSOLUTE: 0.7 K/UL (ref 0–1.3)
MONOCYTES RELATIVE PERCENT: 6.2 %
NEUTROPHILS ABSOLUTE: 10.5 K/UL (ref 1.7–7.7)
NEUTROPHILS RELATIVE PERCENT: 86.8 %
PDW BLD-RTO: 25.6 % (ref 12.4–15.4)
PDW BLD-RTO: 26 % (ref 12.4–15.4)
PLATELET # BLD: 42 K/UL (ref 135–450)
PLATELET # BLD: 44 K/UL (ref 135–450)
PMV BLD AUTO: 8.9 FL (ref 5–10.5)
PMV BLD AUTO: 9.1 FL (ref 5–10.5)
POTASSIUM REFLEX MAGNESIUM: 3.4 MMOL/L (ref 3.5–5.1)
RBC # BLD: 2.25 M/UL (ref 4–5.2)
RBC # BLD: 2.62 M/UL (ref 4–5.2)
SODIUM BLD-SCNC: 136 MMOL/L (ref 136–145)
TRIGL SERPL-MCNC: 131 MG/DL (ref 0–150)
VLDLC SERPL CALC-MCNC: 26 MG/DL
WBC # BLD: 12.1 K/UL (ref 4–11)
WBC # BLD: 13.8 K/UL (ref 4–11)

## 2022-02-12 PROCEDURE — 6360000002 HC RX W HCPCS: Performed by: STUDENT IN AN ORGANIZED HEALTH CARE EDUCATION/TRAINING PROGRAM

## 2022-02-12 PROCEDURE — 93010 ELECTROCARDIOGRAM REPORT: CPT | Performed by: INTERNAL MEDICINE

## 2022-02-12 PROCEDURE — 6370000000 HC RX 637 (ALT 250 FOR IP): Performed by: INTERNAL MEDICINE

## 2022-02-12 PROCEDURE — 2700000000 HC OXYGEN THERAPY PER DAY

## 2022-02-12 PROCEDURE — C9113 INJ PANTOPRAZOLE SODIUM, VIA: HCPCS | Performed by: STUDENT IN AN ORGANIZED HEALTH CARE EDUCATION/TRAINING PROGRAM

## 2022-02-12 PROCEDURE — 85730 THROMBOPLASTIN TIME PARTIAL: CPT

## 2022-02-12 PROCEDURE — 85018 HEMOGLOBIN: CPT

## 2022-02-12 PROCEDURE — 85027 COMPLETE CBC AUTOMATED: CPT

## 2022-02-12 PROCEDURE — 93005 ELECTROCARDIOGRAM TRACING: CPT | Performed by: INTERNAL MEDICINE

## 2022-02-12 PROCEDURE — 6360000002 HC RX W HCPCS: Performed by: INTERNAL MEDICINE

## 2022-02-12 PROCEDURE — 36415 COLL VENOUS BLD VENIPUNCTURE: CPT

## 2022-02-12 PROCEDURE — 85014 HEMATOCRIT: CPT

## 2022-02-12 PROCEDURE — 80048 BASIC METABOLIC PNL TOTAL CA: CPT

## 2022-02-12 PROCEDURE — 85025 COMPLETE CBC W/AUTO DIFF WBC: CPT

## 2022-02-12 PROCEDURE — 2060000000 HC ICU INTERMEDIATE R&B

## 2022-02-12 PROCEDURE — 83735 ASSAY OF MAGNESIUM: CPT

## 2022-02-12 PROCEDURE — 94761 N-INVAS EAR/PLS OXIMETRY MLT: CPT

## 2022-02-12 PROCEDURE — 2580000003 HC RX 258: Performed by: INTERNAL MEDICINE

## 2022-02-12 PROCEDURE — 99233 SBSQ HOSP IP/OBS HIGH 50: CPT | Performed by: NURSE PRACTITIONER

## 2022-02-12 RX ORDER — FUROSEMIDE 10 MG/ML
40 INJECTION INTRAMUSCULAR; INTRAVENOUS 2 TIMES DAILY
Status: DISCONTINUED | OUTPATIENT
Start: 2022-02-12 | End: 2022-02-13

## 2022-02-12 RX ORDER — FUROSEMIDE 10 MG/ML
40 INJECTION INTRAMUSCULAR; INTRAVENOUS ONCE
Status: DISCONTINUED | OUTPATIENT
Start: 2022-02-12 | End: 2022-02-12

## 2022-02-12 RX ORDER — SODIUM CHLORIDE 9 MG/ML
INJECTION, SOLUTION INTRAVENOUS PRN
Status: DISCONTINUED | OUTPATIENT
Start: 2022-02-12 | End: 2022-02-15 | Stop reason: HOSPADM

## 2022-02-12 RX ORDER — POTASSIUM CHLORIDE 20 MEQ/1
40 TABLET, EXTENDED RELEASE ORAL ONCE
Status: COMPLETED | OUTPATIENT
Start: 2022-02-12 | End: 2022-02-12

## 2022-02-12 RX ORDER — MAGNESIUM SULFATE IN WATER 40 MG/ML
2000 INJECTION, SOLUTION INTRAVENOUS ONCE
Status: COMPLETED | OUTPATIENT
Start: 2022-02-12 | End: 2022-02-12

## 2022-02-12 RX ADMIN — CLOPIDOGREL 75 MG: 75 TABLET, FILM COATED ORAL at 10:29

## 2022-02-12 RX ADMIN — FUROSEMIDE 20 MG: 10 INJECTION, SOLUTION INTRAMUSCULAR; INTRAVENOUS at 02:07

## 2022-02-12 RX ADMIN — SODIUM CHLORIDE, PRESERVATIVE FREE 10 ML: 5 INJECTION INTRAVENOUS at 21:07

## 2022-02-12 RX ADMIN — ASPIRIN 81 MG: 81 TABLET, CHEWABLE ORAL at 10:29

## 2022-02-12 RX ADMIN — FUROSEMIDE 40 MG: 10 INJECTION, SOLUTION INTRAMUSCULAR; INTRAVENOUS at 18:11

## 2022-02-12 RX ADMIN — FUROSEMIDE 40 MG: 10 INJECTION, SOLUTION INTRAMUSCULAR; INTRAVENOUS at 13:52

## 2022-02-12 RX ADMIN — PANTOPRAZOLE SODIUM 40 MG: 40 INJECTION, POWDER, FOR SOLUTION INTRAVENOUS at 21:06

## 2022-02-12 RX ADMIN — SODIUM CHLORIDE, PRESERVATIVE FREE 10 ML: 5 INJECTION INTRAVENOUS at 09:11

## 2022-02-12 RX ADMIN — SERTRALINE HYDROCHLORIDE 50 MG: 50 TABLET ORAL at 10:30

## 2022-02-12 RX ADMIN — METOPROLOL TARTRATE 37.5 MG: 25 TABLET, FILM COATED ORAL at 13:52

## 2022-02-12 RX ADMIN — POTASSIUM CHLORIDE 40 MEQ: 1500 TABLET, EXTENDED RELEASE ORAL at 13:52

## 2022-02-12 RX ADMIN — ATORVASTATIN CALCIUM 80 MG: 80 TABLET, FILM COATED ORAL at 21:07

## 2022-02-12 RX ADMIN — MAGNESIUM SULFATE HEPTAHYDRATE 2000 MG: 2 INJECTION, SOLUTION INTRAVENOUS at 13:57

## 2022-02-12 RX ADMIN — PANTOPRAZOLE SODIUM 40 MG: 40 INJECTION, POWDER, FOR SOLUTION INTRAVENOUS at 10:30

## 2022-02-12 ASSESSMENT — PAIN SCALES - GENERAL
PAINLEVEL_OUTOF10: 0

## 2022-02-12 ASSESSMENT — ENCOUNTER SYMPTOMS
COUGH: 0
ABDOMINAL DISTENTION: 0
CHEST TIGHTNESS: 0
CONSTIPATION: 0
DIARRHEA: 0
VOMITING: 0
SORE THROAT: 0
NAUSEA: 0
ABDOMINAL PAIN: 0
SHORTNESS OF BREATH: 0
BLOOD IN STOOL: 0
COLOR CHANGE: 0
RHINORRHEA: 0
WHEEZING: 0

## 2022-02-12 NOTE — PLAN OF CARE
Problem: Pain:  Goal: Pain level will decrease  Description: Pain level will decrease  Outcome: Ongoing   Pt denies  pain at this time. Vital signs stable. Pt resting comfortably in bed continue to monitor. Problem: OXYGENATION/RESPIRATORY FUNCTION  Goal: Patient will maintain patent airway  Outcome: Ongoing   The patient is currently on 3 liters of oxygen. She is room air baseline. Will continue to win him off on oxygen. Problem: HEMODYNAMIC STATUS  Goal: Patient has stable vital signs and fluid balance  Outcome: Ongoing   Pt remains hemodynamically stable  with no signs of decrease cardiac output. Will continue to monitor.

## 2022-02-12 NOTE — PROGRESS NOTES
Progress Note    Admit Date: 2/9/2022  Day: 4  Diet: ADULT DIET; Regular; Low Fat/Low Chol/High Fiber/RENATO    CC: N/V, CP    Interval history: Yesterday, the patient's pro-BNP climbed to 26,679. With her persistence of symptoms and elevated troponin, she was sent to the cath lab urgently. Extensive stenosis of the coronary arteries was identified and stenting was accomplished of the diagonal branch #2 and ostium. The patient is continued on Plavix and ASA 81mg. She additionally received 1U PRBCs overnight due to hgb of 7.8. Today, the patient states she feels much better. She denies CP, N/V/D/C, abdominal pain. She continues to be in Afib however her HR is below 100. She briefly had an increased oxygen requirement but this was before she was transfused platelets this morning. She otherwise feels well. Awaiting labs today. HPI: Ms. Leon Rodriguez is an 78year old female with a PMH of JONATHAN 2/2 chronic blood loss, MDS, systolic CHF, Afib on Eliquis, GIB, glaucoma, HTN who presents as a direct transfer from [facility] ED initially presenting with N/V, CP and abdominal pain. She states her CP, which radiates to between her shoulder blades, started about a week ago and was followed yesterday by nausea with predominant retching that is relieved by vomiting. She states she quit smoking about 20 years ago and that she rarely drinks and denies using narcotics or other drugs. She states that about a year ago she was seen in a hospital in [place] while visiting primarily due to respiratory distress and during that 7-8 day visit, she was diagnosed with porcelain gallbladder, and a \"heart infection\" though she does not know the details.      In the [facility] ED, the patient was noted to be hypoxic to 86-88% on RA and to have 10/10 epigastric pain and nausea; she was given antiemetics and dilaudid.  CT read as heart failure, followed-up with a CXR, and BNP was 3,999; coarse rales on pulmonary auscultation; Tn 0.04, no acute ischemia on EKG. Medications:     Scheduled Meds:   sertraline  50 mg Oral Daily    sodium chloride flush  5-40 mL IntraVENous 2 times per day    atorvastatin  80 mg Oral Nightly    clopidogrel  75 mg Oral Daily    aspirin  81 mg Oral Daily    furosemide  20 mg IntraVENous Once    metoprolol tartrate  37.5 mg Oral BID    nitroglycerin  0.5 inch Topical 4 times per day    pantoprazole  40 mg IntraVENous BID     Continuous Infusions:   sodium chloride      sodium chloride      sodium chloride       PRN Meds:sodium chloride, perflutren lipid microspheres, sodium chloride flush, sodium chloride, acetaminophen, ondansetron, sodium chloride flush, ondansetron **OR** ondansetron, polyethylene glycol, acetaminophen **OR** acetaminophen, sodium chloride    Objective:   Vitals:   T-max:  Patient Vitals for the past 8 hrs:   BP Temp Temp src Pulse Resp SpO2   02/12/22 0855 (!) 114/51 98.2 °F (36.8 °C) Oral 98 21 94 %   02/12/22 0825 (!) 99/49 98.2 °F (36.8 °C) Oral 98 26 92 %   02/12/22 0502 (!) 102/54 98.1 °F (36.7 °C) Oral 94 18 90 %   02/12/22 0203 106/61   96  93 %       Intake/Output Summary (Last 24 hours) at 2/12/2022 0936  Last data filed at 2/12/2022 0502  Gross per 24 hour   Intake 80 ml   Output 1700 ml   Net -1620 ml       Review of Systems   Constitutional: Negative for appetite change, chills and fever. HENT: Negative for congestion, hearing loss, rhinorrhea and sore throat. Eyes: Negative for visual disturbance. Respiratory: Negative for cough, chest tightness, shortness of breath and wheezing. Cardiovascular: Negative for chest pain, palpitations and leg swelling. Gastrointestinal: Negative for abdominal distention, abdominal pain, blood in stool, constipation, diarrhea, nausea and vomiting. Endocrine: Negative for polyuria. Genitourinary: Negative for difficulty urinating and dysuria. Musculoskeletal: Negative for arthralgias and myalgias.    Skin: Negative for color change, pallor and rash. Neurological: Negative for dizziness, seizures, syncope, facial asymmetry, weakness, light-headedness and headaches. Psychiatric/Behavioral: Negative for behavioral problems, confusion and hallucinations. Physical Exam  Constitutional:       General: She is not in acute distress. Appearance: Normal appearance. She is not ill-appearing, toxic-appearing or diaphoretic. HENT:      Head: Normocephalic and atraumatic. Right Ear: External ear normal.      Left Ear: External ear normal.      Nose: Nose normal. No congestion or rhinorrhea. Mouth/Throat:      Mouth: Mucous membranes are moist.      Pharynx: Oropharynx is clear. Eyes:      Extraocular Movements: Extraocular movements intact. Conjunctiva/sclera: Conjunctivae normal.      Pupils: Pupils are equal, round, and reactive to light. Cardiovascular:      Rate and Rhythm: Normal rate and regular rhythm. Pulses: Normal pulses. Heart sounds: Normal heart sounds. No murmur heard. No friction rub. No gallop. Pulmonary:      Effort: Pulmonary effort is normal. No respiratory distress. Breath sounds: No wheezing, rhonchi or rales. Abdominal:      General: Abdomen is flat. Bowel sounds are normal. There is no distension. Palpations: Abdomen is soft. There is no mass. Tenderness: There is no abdominal tenderness. There is no guarding or rebound. Hernia: No hernia is present. Musculoskeletal:         General: No swelling, tenderness, deformity or signs of injury. Normal range of motion. Cervical back: Normal range of motion and neck supple. Right lower leg: No edema. Left lower leg: No edema. Skin:     General: Skin is warm and dry. Capillary Refill: Capillary refill takes less than 2 seconds. Coloration: Skin is not jaundiced or pale. Neurological:      General: No focal deficit present. Mental Status: She is alert and oriented to person, place, and time. Mental status is at baseline. Cranial Nerves: No cranial nerve deficit. Psychiatric:         Mood and Affect: Mood normal.         Behavior: Behavior normal.         Thought Content: Thought content normal.         Judgment: Judgment normal.         LABS:    CBC:   Recent Labs     02/09/22  2356 02/09/22  2356 02/10/22  0430 02/10/22  0922 02/11/22  0013 02/11/22  0416 02/11/22  2324   WBC 16.3*  --  19.4*  --   --  20.1*  --    HGB 6.8*   < > 8.5*   < > 8.9* 8.4* 7.8*   HCT 20.8*  21.0*   < > 25.9*   < > 26.8* 25.6* 23.7*   PLT 57*  --  55*  --   --  50*  --    .3*  --  107.0*  --   --  104.9*  --     < > = values in this interval not displayed. Renal:    Recent Labs     02/10/22  0430 02/11/22  0416   * 134*   K 4.4 4.5    102   CO2 20* 21   BUN 31* 33*   CREATININE 0.7 0.7   GLUCOSE 183* 161*   CALCIUM 9.0 8.9   ANIONGAP 12 11     Hepatic:   No results for input(s): AST, ALT, BILITOT, BILIDIR, PROT, LABALBU, ALKPHOS in the last 72 hours. Troponin:   Recent Labs     02/10/22  0430 02/10/22  1410 02/11/22  0416   TROPONINI 1.19* 1.21* 0.96*     BNP: 3,999 -> 26,679 (2/11)    Lactate (2/9/22): 1.7      Cultures: legionella presumptive negative, BCx x2 NGTD, respiratory Cx with 3+ epithelial cells, 1+ ciliated epithelial cells, 2+ G- rods, 3+ G+ cocci, 1+ yeast.    -----------------------------------------------------------------      RAD:   CTA CHEST ABDOMEN PELVIS W CONTRAST   Final Result   1. No evidence of abdominal aortic aneurysm, dissection or intramural hematoma   2. Normal thoracic and abdominal aortic diameter   3. Bilateral lower lobe basilar pneumonia and effusions   4. Colonic diverticulosis   5. Porcelain gallbladder              Assessment/Plan:   Ms. Khanh Gould is an 78year old female with a PMH of JONATHAN 2/2 chronic blood loss, MDS, systolic CHF, Afib on Eliquis, GIB, glaucoma, HLD and HTN who presents as a direct transfer from [facility] initially presenting with N/V, CP and abdominal pain.      Chest pain  N/V  Acute coronary syndrome  NSTEMI  Tn 0.04 at OSH, uptrended to 1.21 before downtrending to 0.96. S/p cath, stenting done 2/11/22. - Phenergan for nausea  - Telemetry  - Plavix, ASA 81mg  - Lipitor 80mg QHS  - Morphine for CP relief  - Topical nitrates for CP per cardio    Abdominal pain/cramping  History of esophageal spasm  Gastric ulcer  Porcelain gallbladder  EGD in 2012 showing gastric ulcer, gastritits. Esophageal spasm on prior recent hospitalization.  - Protonix 40 BID  - GI consult, recs appreciated  - GI consulted, recs appreciated  - Semi-elective EGD, hold off for now given ACS & lack of active GIB per GI    Afib  On Eliquis at home. - Metoprolol 37.5mg BID     Macrocytic anemia  Myelodysplastic syndrome (MDS)  Thrombocytopenia  May be a component of MDS, blood loss and/or vitamin deficiency to her anemia. Patient has a history of GIB and states she bleeds easily with trauma. Patient was given a unit of PRBCs in the OSH due to hgb of 5.5. FOBT was negative at OSH on admission. Immature reticulocyte fraction 0.68. Hgb 8.4 this AM. No schistocytes on peripheral blood smear (PBS). Plt count 50 this AM. Occasional poikilocytes, polychromasia, tear drop cells, anisocytosis, hyperchromasia, ovalocytes, macrocytes and microcytes on PBS with 1+ hypochromia & 1% myelocytes & relative metamyelocytes. Folate 14.97, vit B12 707, ferritin 359.2. Received 1U PRBCs overnight due to hgb of 7.8.   - Monitor H&H Q8H  - Transfuse for less than 8 hgb  - Heme/onc consult, recs appreciated  - Stop Hydrea for now per oncology  - Bone marrow bx outpt per oncology     Pulmonary edema likely 2/2 CHF  As seen on CXR (2/9/22), unlikely to be PNA, patient denies chills/fever, no cough or leukocytosis. Patient appears euvolemic on exam. BNP 3,999. Last echo (9/30/21) with EF 55-60%.   - Continue to monitor fluid status     Chronic issues:  HTN - home metoprolol  Dyslipidemia - Total chol 110, HDL 38, LDL 53, VLDL 19, TG 96        Code Status: Full code  FEN: adult regular diet, low fat/chol/high fiber/RENATO  PPX: ASA, plavix  DISPO: IP      Divine Cantu MD, PGY-1  02/12/22  9:36 AM    This patient has been staffed and discussed with Malika Morrison MD.

## 2022-02-12 NOTE — PROGRESS NOTES
Pt's daughter Monserrat Garcia, updated about pt's status and plans of care. All questions answered.  Electronically signed by Grabiel Retana RN on 2/12/2022 at 8:12 AM

## 2022-02-12 NOTE — PROGRESS NOTES
[address] [place] [address]  GI Progress Note          Mari Rankin is a 78 y.o. female patient. No diagnosis found. Admit Date: 2022    Subjective:       Had 2 coronary stents placed last PM, feeling 100% better no CP abd pain N/V, no melena or rectal bleeding     Scheduled Meds:   sertraline  50 mg Oral Daily    sodium chloride flush  5-40 mL IntraVENous 2 times per day    atorvastatin  80 mg Oral Nightly    clopidogrel  75 mg Oral Daily    aspirin  81 mg Oral Daily    furosemide  20 mg IntraVENous Once    metoprolol tartrate  37.5 mg Oral BID    nitroglycerin  0.5 inch Topical 4 times per day    pantoprazole  40 mg IntraVENous BID     Objective:       Patient Vitals for the past 24 hrs:   BP Temp Temp src Pulse Resp SpO2 Weight   22 0825 (!) 99/49   98 26     22 0502 (!) 102/54 98.1 °F (36.7 °C) Oral 94 18 90 %    22 0203 106/61   96  93 %    22 0011 (!) 92/52 97.2 °F (36.2 °C) Oral 87 18 94 %    22 2109 97/60 97 °F (36.1 °C) Oral 99 18 90 %    22 (!) 94/58 97.1 °F (36.2 °C) Oral 95 18 96 %    22 1855 (!) 110/53         22 1832 (!) 102/56   94  100 %    22 1811 99/69 98.5 °F (36.9 °C) Oral 93 17 98 %    22 1505      97 %    22 1300 (!) 93/54 98.5 °F (36.9 °C) Oral 93 22 99 %    22 0919       156 lb 15.5 oz (71.2 kg)       Exam:  VITALS:  BP (!) 99/49   Pulse 98   Temp 98.1 °F (36.7 °C) (Oral)   Resp 26   Ht 5' 1\" (1.549 m)   Wt 156 lb 15.5 oz (71.2 kg)   SpO2 90%   BMI 29.66 kg/m²   TEMPERATURE:  Current - Temp: 98.1 °F (36.7 °C); Max - Temp  Av.7 °F (36.5 °C)  Min: 97 °F (36.1 °C)  Max: 98.5 °F (36.9 °C)    NAD  General appearance: alert, appears stated age, cooperative and no distress  Abdomen: soft, non-tender; bowel sounds normal; no masses,  no organomegaly  AAOx3, No asterixis or encephalopathy  Extremities: No edema.       Recent Labs     22  2356 02/09/22 2356 02/10/22  0430 02/10/22  0922 02/11/22  0013 02/11/22 0416 02/11/22  2324   WBC 16.3*  --  19.4*  --   --  20.1*  --    HGB 6.8*   < > 8.5*   < > 8.9* 8.4* 7.8*   HCT 20.8*  21.0*   < > 25.9*   < > 26.8* 25.6* 23.7*   .3*  --  107.0*  --   --  104.9*  --    PLT 57*  --  55*  --   --  50*  --     < > = values in this interval not displayed. Recent Labs     02/10/22  0430 02/11/22  0416   * 134*   K 4.4 4.5    102   CO2 20* 21   BUN 31* 33*   CREATININE 0.7 0.7       Assessment:       Active Problems:    Symptomatic anemia    Chest pain  Resolved Problems:    * No resolved hospital problems.  *    S/p 2 stents last PM feeling much better today nausea resolved n GI bleeding  H/H slightly decreased    Recommendations:       Ultimately if anemia not felt to be from MDS will need EGD/colonoscopy after recovery from MI, cardiac intervention but will see her in office in several weeks to re evaluate as no sign of active GI bleeding  Continue PPI  OK to anticoagulate since no clinically active bleeding      Flash Liu MD  2/12/2022  8:52 AM

## 2022-02-12 NOTE — PROGRESS NOTES
CC:  HPI:     S: Feeling much better than yesterday. Breathing ok. No chest pain. Denies right groin pain. On 2 liters oxygen    Required blood transfusion this morning. Tele:Sinus     O:  Physical Exam:  BP (!) 114/51   Pulse 98   Temp 98.2 °F (36.8 °C) (Oral)   Resp 21   Ht 5' 1\" (1.549 m)   Wt 156 lb 15.5 oz (71.2 kg)   SpO2 94%   BMI 29.66 kg/m²    General (appearance):  No acute distress  Eyes: anicteric   Neck: soft, No JVD  Ears/Nose/Mouth/Thorat: No cyanosis  CV: RRR   Respiratory:  Clear, normal effort  GI: soft, non-tender, non-distended  Skin: Warm, dry. No rashes  Neuro/Psych: Alert and oriented x 3. Appropriate behavior  Ext:  No c/c. No significant edema  Pulses:  2+ right femoral. Right groin soft, no hematoma. I.O's=     Weight  Admission: Weight: 130 lb (59 kg)   Today: Weight: 156 lb 15.5 oz (71.2 kg)    CBC: Recent Labs     22  2356 22  2356 02/10/22  0430 02/10/22  0922 22  0013 22  0416 22  2324   WBC 16.3*  --  19.4*  --   --  20.1*  --    HGB 6.8*   < > 8.5*   < > 8.9* 8.4* 7.8*   HCT 20.8*  21.0*   < > 25.9*   < > 26.8* 25.6* 23.7*   .3*  --  107.0*  --   --  104.9*  --    PLT 57*  --  55*  --   --  50*  --     < > = values in this interval not displayed. BMP:   Recent Labs     02/10/22  0430 02/11/22  0416   * 134*   K 4.4 4.5    102   CO2 20* 21   BUN 31* 33*   CREATININE 0.7 0.7     Mag:   Lab Results   Component Value Date    MG 1.6 2012     LIVER PROFILE: No results for input(s): AST, ALT, LIPASE, BILIDIR, BILITOT, ALKPHOS in the last 72 hours. Invalid input(s): AMYLASE,  ALB  PT/INR: No results for input(s): PROTIME, INR in the last 72 hours.   Pro-BNP:   Lab Results   Component Value Date    PROBNP 26,679 2022    PROBNP 3,999 2022     CARDIAC ENZYMES:   Recent Labs     02/10/22  0430 02/10/22  1410 22  0416   TROPONINI 1.19* 1.21* 0.96*         Imagin/11/2022 Coronary angiogram LAD mid vessel 70% diffuse disease after second diagonal branch.  MABEL grade III flow present.  Second diagonal branch ostial 99% stenosis mid vessel 99% stenosis diffuse disease in between. Right coronary artery collateralized distal right coronary occluded  Circumflex and obtuse marginal branches with diffuse moderate disease. LVEDP 20 mmHg LVEF estimated at 40% with diffuse hypokinesia more pronounced in the anterior apex  Diagonal 99% stented 2.5 x 18 mm [place] CECIL    2/9/2022 CTA chest  1. No evidence of abdominal aortic aneurysm, dissection or intramural hematoma   2. Normal thoracic and abdominal aortic diameter   3. Bilateral lower lobe basilar pneumonia and effusions   4. Colonic diverticulosis   5. Porcelain gallbladder     2/9/2022 CT abd/pelvis  Congestive heart failure.       Small hiatal hernia.       Porcelain gallbladder.       Evidence of old granulomatous disease.       Mild sigmoid diverticulosis.       Soft tissue prominence incompletely imaged at the anal rectal junction   region.  Correlate clinically. 9/30/2021 Echo:  Left ventricular systolic function is normal with ejection fraction   estimated at 55-60 %. No regional wall motion abnormalities are noted. There is mild concentric left ventricular hypertrophy. Normal left ventricular diastolic filling pressure. Mild anterior and posterior mitral annular calcification is present. Moderate mitral regurgitation. The appears to be a small calcified echogenic mass near LVOT, likely   represents degenerative valve disease, and is less likely a vegetation. Study was compared to 2014 echo and the LVOT mass was not present in 2014. Mild aortic regurgitation is present. Moderate tricuspid regurgitation. Normal systolic pulmonary artery pressure (SPAP) estimated at 32 mmHg (RA   pressure 3 mmHg). Mild pulmonic regurgitation present. 1/23/2014 55% EF, mild MR. Assessment:  78 y. o. with   Issues:  -Unstable angina  -CAD s/p PCI   -Acute combined systolic and diastolic CHF  -myelodysplastic syndrome  -Thrombocytopenia  -Anemia   -hx GI bleed. Plan:  -Keep K>4, Mg>2.  -No Eliquis  -Plavix  -ASA  -Lipitor  -Metoprolol   -Nitro.  Hold for SBP < 90 mmHg  -Lasix 40 mg IV BID

## 2022-02-13 LAB
ABO/RH: NORMAL
ANION GAP SERPL CALCULATED.3IONS-SCNC: 8 MMOL/L (ref 3–16)
ANTIBODY SCREEN: NORMAL
APTT: 39.7 SEC (ref 26.2–38.6)
APTT: 40.1 SEC (ref 26.2–38.6)
APTT: 41.8 SEC (ref 26.2–38.6)
BASOPHILS ABSOLUTE: 0 K/UL (ref 0–0.2)
BASOPHILS ABSOLUTE: 0 K/UL (ref 0–0.2)
BASOPHILS RELATIVE PERCENT: 0.1 %
BASOPHILS RELATIVE PERCENT: 0.3 %
BLOOD BANK DISPENSE STATUS: NORMAL
BLOOD BANK PRODUCT CODE: NORMAL
BPU ID: NORMAL
BUN BLDV-MCNC: 21 MG/DL (ref 7–20)
CALCIUM SERPL-MCNC: 8.5 MG/DL (ref 8.3–10.6)
CHLORIDE BLD-SCNC: 98 MMOL/L (ref 99–110)
CO2: 30 MMOL/L (ref 21–32)
CREAT SERPL-MCNC: 0.6 MG/DL (ref 0.6–1.2)
DESCRIPTION BLOOD BANK: NORMAL
EOSINOPHILS ABSOLUTE: 0.1 K/UL (ref 0–0.6)
EOSINOPHILS ABSOLUTE: 0.2 K/UL (ref 0–0.6)
EOSINOPHILS RELATIVE PERCENT: 1.3 %
EOSINOPHILS RELATIVE PERCENT: 1.4 %
GFR AFRICAN AMERICAN: >60
GFR NON-AFRICAN AMERICAN: >60
GLUCOSE BLD-MCNC: 127 MG/DL (ref 70–99)
HCT VFR BLD CALC: 25.4 % (ref 36–48)
HCT VFR BLD CALC: 26 % (ref 36–48)
HCT VFR BLD CALC: 27.9 % (ref 36–48)
HEMOGLOBIN: 8.6 G/DL (ref 12–16)
HEMOGLOBIN: 8.7 G/DL (ref 12–16)
HEMOGLOBIN: 9.3 G/DL (ref 12–16)
LYMPHOCYTES ABSOLUTE: 1 K/UL (ref 1–5.1)
LYMPHOCYTES ABSOLUTE: 1.1 K/UL (ref 1–5.1)
LYMPHOCYTES RELATIVE PERCENT: 8.5 %
LYMPHOCYTES RELATIVE PERCENT: 8.7 %
MAGNESIUM: 1.6 MG/DL (ref 1.8–2.4)
MCH RBC QN AUTO: 33.8 PG (ref 26–34)
MCH RBC QN AUTO: 34.1 PG (ref 26–34)
MCHC RBC AUTO-ENTMCNC: 33.3 G/DL (ref 31–36)
MCHC RBC AUTO-ENTMCNC: 33.9 G/DL (ref 31–36)
MCV RBC AUTO: 100.6 FL (ref 80–100)
MCV RBC AUTO: 101.6 FL (ref 80–100)
MONOCYTES ABSOLUTE: 0.7 K/UL (ref 0–1.3)
MONOCYTES ABSOLUTE: 0.8 K/UL (ref 0–1.3)
MONOCYTES RELATIVE PERCENT: 5.9 %
MONOCYTES RELATIVE PERCENT: 6.2 %
NEUTROPHILS ABSOLUTE: 10.3 K/UL (ref 1.7–7.7)
NEUTROPHILS ABSOLUTE: 9.7 K/UL (ref 1.7–7.7)
NEUTROPHILS RELATIVE PERCENT: 83.6 %
NEUTROPHILS RELATIVE PERCENT: 84 %
PDW BLD-RTO: 24.2 % (ref 12.4–15.4)
PDW BLD-RTO: 24.9 % (ref 12.4–15.4)
PLATELET # BLD: 41 K/UL (ref 135–450)
PLATELET # BLD: 66 K/UL (ref 135–450)
PMV BLD AUTO: 8.5 FL (ref 5–10.5)
PMV BLD AUTO: 9.7 FL (ref 5–10.5)
POTASSIUM REFLEX MAGNESIUM: 3.5 MMOL/L (ref 3.5–5.1)
PRO-BNP: ABNORMAL PG/ML (ref 0–449)
RBC # BLD: 2.53 M/UL (ref 4–5.2)
RBC # BLD: 2.75 M/UL (ref 4–5.2)
SODIUM BLD-SCNC: 136 MMOL/L (ref 136–145)
WBC # BLD: 11.5 K/UL (ref 4–11)
WBC # BLD: 12.3 K/UL (ref 4–11)

## 2022-02-13 PROCEDURE — 36415 COLL VENOUS BLD VENIPUNCTURE: CPT

## 2022-02-13 PROCEDURE — 6360000002 HC RX W HCPCS: Performed by: INTERNAL MEDICINE

## 2022-02-13 PROCEDURE — 83880 ASSAY OF NATRIURETIC PEPTIDE: CPT

## 2022-02-13 PROCEDURE — 2580000003 HC RX 258: Performed by: INTERNAL MEDICINE

## 2022-02-13 PROCEDURE — 86850 RBC ANTIBODY SCREEN: CPT

## 2022-02-13 PROCEDURE — C9113 INJ PANTOPRAZOLE SODIUM, VIA: HCPCS | Performed by: STUDENT IN AN ORGANIZED HEALTH CARE EDUCATION/TRAINING PROGRAM

## 2022-02-13 PROCEDURE — 6370000000 HC RX 637 (ALT 250 FOR IP): Performed by: INTERNAL MEDICINE

## 2022-02-13 PROCEDURE — 99232 SBSQ HOSP IP/OBS MODERATE 35: CPT | Performed by: NURSE PRACTITIONER

## 2022-02-13 PROCEDURE — 83735 ASSAY OF MAGNESIUM: CPT

## 2022-02-13 PROCEDURE — 85025 COMPLETE CBC W/AUTO DIFF WBC: CPT

## 2022-02-13 PROCEDURE — 6360000002 HC RX W HCPCS: Performed by: STUDENT IN AN ORGANIZED HEALTH CARE EDUCATION/TRAINING PROGRAM

## 2022-02-13 PROCEDURE — 94761 N-INVAS EAR/PLS OXIMETRY MLT: CPT

## 2022-02-13 PROCEDURE — 2700000000 HC OXYGEN THERAPY PER DAY

## 2022-02-13 PROCEDURE — 6370000000 HC RX 637 (ALT 250 FOR IP): Performed by: STUDENT IN AN ORGANIZED HEALTH CARE EDUCATION/TRAINING PROGRAM

## 2022-02-13 PROCEDURE — P9035 PLATELET PHERES LEUKOREDUCED: HCPCS

## 2022-02-13 PROCEDURE — 85730 THROMBOPLASTIN TIME PARTIAL: CPT

## 2022-02-13 PROCEDURE — 80048 BASIC METABOLIC PNL TOTAL CA: CPT

## 2022-02-13 PROCEDURE — 86900 BLOOD TYPING SEROLOGIC ABO: CPT

## 2022-02-13 PROCEDURE — 86901 BLOOD TYPING SEROLOGIC RH(D): CPT

## 2022-02-13 PROCEDURE — 2060000000 HC ICU INTERMEDIATE R&B

## 2022-02-13 PROCEDURE — 36430 TRANSFUSION BLD/BLD COMPNT: CPT

## 2022-02-13 RX ORDER — MAGNESIUM SULFATE IN WATER 40 MG/ML
2000 INJECTION, SOLUTION INTRAVENOUS ONCE
Status: COMPLETED | OUTPATIENT
Start: 2022-02-13 | End: 2022-02-13

## 2022-02-13 RX ORDER — FUROSEMIDE 10 MG/ML
20 INJECTION INTRAMUSCULAR; INTRAVENOUS ONCE
Status: COMPLETED | OUTPATIENT
Start: 2022-02-13 | End: 2022-02-13

## 2022-02-13 RX ORDER — POTASSIUM CHLORIDE 20 MEQ/1
40 TABLET, EXTENDED RELEASE ORAL ONCE
Status: COMPLETED | OUTPATIENT
Start: 2022-02-13 | End: 2022-02-13

## 2022-02-13 RX ORDER — FUROSEMIDE 10 MG/ML
40 INJECTION INTRAMUSCULAR; INTRAVENOUS ONCE
Status: COMPLETED | OUTPATIENT
Start: 2022-02-13 | End: 2022-02-13

## 2022-02-13 RX ORDER — SODIUM CHLORIDE 9 MG/ML
INJECTION, SOLUTION INTRAVENOUS PRN
Status: DISCONTINUED | OUTPATIENT
Start: 2022-02-13 | End: 2022-02-15 | Stop reason: HOSPADM

## 2022-02-13 RX ADMIN — SODIUM CHLORIDE, PRESERVATIVE FREE 30 ML: 5 INJECTION INTRAVENOUS at 12:21

## 2022-02-13 RX ADMIN — ASPIRIN 81 MG: 81 TABLET, CHEWABLE ORAL at 10:06

## 2022-02-13 RX ADMIN — SODIUM CHLORIDE, PRESERVATIVE FREE 10 ML: 5 INJECTION INTRAVENOUS at 20:54

## 2022-02-13 RX ADMIN — METOPROLOL TARTRATE 37.5 MG: 25 TABLET, FILM COATED ORAL at 20:54

## 2022-02-13 RX ADMIN — APIXABAN 5 MG: 5 TABLET, FILM COATED ORAL at 20:54

## 2022-02-13 RX ADMIN — PANTOPRAZOLE SODIUM 40 MG: 40 INJECTION, POWDER, FOR SOLUTION INTRAVENOUS at 20:54

## 2022-02-13 RX ADMIN — FUROSEMIDE 40 MG: 10 INJECTION, SOLUTION INTRAVENOUS at 11:30

## 2022-02-13 RX ADMIN — MAGNESIUM SULFATE HEPTAHYDRATE 2000 MG: 2 INJECTION, SOLUTION INTRAVENOUS at 12:20

## 2022-02-13 RX ADMIN — POTASSIUM CHLORIDE 40 MEQ: 1500 TABLET, EXTENDED RELEASE ORAL at 10:57

## 2022-02-13 RX ADMIN — FUROSEMIDE 20 MG: 10 INJECTION, SOLUTION INTRAVENOUS at 12:16

## 2022-02-13 RX ADMIN — ATORVASTATIN CALCIUM 80 MG: 80 TABLET, FILM COATED ORAL at 20:53

## 2022-02-13 RX ADMIN — PANTOPRAZOLE SODIUM 40 MG: 40 INJECTION, POWDER, FOR SOLUTION INTRAVENOUS at 10:06

## 2022-02-13 RX ADMIN — APIXABAN 5 MG: 5 TABLET, FILM COATED ORAL at 10:06

## 2022-02-13 RX ADMIN — SERTRALINE HYDROCHLORIDE 50 MG: 50 TABLET ORAL at 10:06

## 2022-02-13 RX ADMIN — CLOPIDOGREL 75 MG: 75 TABLET, FILM COATED ORAL at 10:06

## 2022-02-13 ASSESSMENT — PAIN SCALES - GENERAL
PAINLEVEL_OUTOF10: 0

## 2022-02-13 NOTE — PLAN OF CARE
Problem: Pain:  Goal: Pain level will decrease  Description: Pain level will decrease  Outcome: Ongoing  Note: No complaints of pain at this time. Problem: Falls - Risk of:  Goal: Will remain free from falls  Description: Will remain free from falls  2/12/2022 2135 by Celia Wolfe RN  Outcome: Ongoing  Note: Bed is locked and in lowest position. Bed alarm is on. Pt educated to use call light for assistance. Pt is A&Ox4    Problem: OXYGENATION/RESPIRATORY FUNCTION  Goal: Patient will maintain patent airway  Outcome: Ongoing  Note: Airway remains patent on 2L nasal cannula      Problem: Bleeding:  Goal: Will show no signs and symptoms of excessive bleeding  Description: Will show no signs and symptoms of excessive bleeding  Outcome: Ongoing  Note: No s/s of bleeding noted.

## 2022-02-13 NOTE — PROGRESS NOTES
edema. Recent Labs     02/12/22  0821 02/12/22  1353 02/12/22  1746 02/12/22  2334 02/13/22  0511   WBC 12.1*  --  13.8*  --  11.5*   HGB 7.7*   < > 8.9*  8.9* 8.7* 8.6*   HCT 23.8*   < > 27.0*  26.7* 26.0* 25.4*   .5*  --  103.3*  --  100.6*   PLT 42*  --  44*  --  41*    < > = values in this interval not displayed. Recent Labs     02/11/22  0416 02/12/22  0821 02/13/22  0511   * 136 136   K 4.5 3.4* 3.5    100 98*   CO2 21 27 30   BUN 33* 26* 21*   CREATININE 0.7 0.7 0.6       Assessment:       Active Problems:    Symptomatic anemia    Chest pain  Resolved Problems:    * No resolved hospital problems. *    No signs of GI bleeding H/H stable stool heme neg  S/p 2 stents    Recommendations:        Will see in office in 3 weeks to see if needs EGD/colonoscopy but will need severe thrombocytopenia addressed and will let cardiac status stabilize  Daily PPI  Will sign off recall if needed    Gary Cole MD  2/13/2022  8:16 AM

## 2022-02-13 NOTE — PROGRESS NOTES
2 RN verified Platelets. Pt and daughter agreeable and consent verified. RN currently at bedside for 15 minute checks. This rn verified Lasix orders. Per Renan, give 40 with/prior to platelets and 20 once completed.

## 2022-02-13 NOTE — PROGRESS NOTES
Progress Note    Admit Date: 2/9/2022  Day: 4  Diet: ADULT DIET; Regular; Low Fat/Low Chol/High Fiber/RENATO    CC: N/V, CP    Interval history:   Patient was seen and examined this morning. No acute event overnight. Her Hb has remained stable at 8.6. Patient mentions that she is doing well. She denies having chest pain, SOB. Reports that her nausea and vomiting has improved and she is able to tolerate oral intake. She denies having abdominal pain, constipation, diarrhea, cough, fever, LE edema. Medications:     Scheduled Meds:   magnesium sulfate  2,000 mg IntraVENous Once    potassium chloride  40 mEq Oral Once    furosemide  40 mg IntraVENous BID    apixaban  5 mg Oral BID    sertraline  50 mg Oral Daily    sodium chloride flush  5-40 mL IntraVENous 2 times per day    atorvastatin  80 mg Oral Nightly    clopidogrel  75 mg Oral Daily    aspirin  81 mg Oral Daily    metoprolol tartrate  37.5 mg Oral BID    nitroglycerin  0.5 inch Topical 4 times per day    pantoprazole  40 mg IntraVENous BID     Continuous Infusions:   sodium chloride      sodium chloride      sodium chloride       PRN Meds:sodium chloride, perflutren lipid microspheres, sodium chloride flush, sodium chloride, acetaminophen, ondansetron, sodium chloride flush, ondansetron **OR** ondansetron, polyethylene glycol, acetaminophen **OR** acetaminophen, sodium chloride    Objective:   Vitals:   T-max:  Patient Vitals for the past 8 hrs:   BP Temp Temp src Pulse Resp SpO2 Weight   02/13/22 0856      92 %    02/13/22 0555       141 lb 1.5 oz (64 kg)   02/13/22 0459 (!) 104/49         02/13/22 0434 (!) 91/48 97.4 °F (36.3 °C) Oral 93 16 92 %        Intake/Output Summary (Last 24 hours) at 2/13/2022 0917  Last data filed at 2/13/2022 0558  Gross per 24 hour   Intake 540 ml   Output 1800 ml   Net -1260 ml         Physical Exam  Vitals reviewed. Constitutional:       General: She is not in acute distress. Appearance: Normal appearance. She is not ill-appearing, toxic-appearing or diaphoretic. HENT:      Head: Normocephalic and atraumatic. Nose: No congestion or rhinorrhea. Mouth/Throat:      Mouth: Mucous membranes are moist.      Pharynx: Oropharynx is clear. Eyes:      Extraocular Movements: Extraocular movements intact. Conjunctiva/sclera: Conjunctivae normal.      Pupils: Pupils are equal, round, and reactive to light. Cardiovascular:      Rate and Rhythm: Normal rate and regular rhythm. Pulses: Normal pulses. Heart sounds: Normal heart sounds. No murmur heard. No friction rub. No gallop. Pulmonary:      Effort: Pulmonary effort is normal. No respiratory distress. Breath sounds: Normal breath sounds. No wheezing, rhonchi or rales. Abdominal:      General: There is no distension. Palpations: Abdomen is soft. There is no mass. Tenderness: There is no abdominal tenderness. There is no guarding or rebound. Hernia: No hernia is present. Musculoskeletal:         General: No swelling, tenderness, deformity or signs of injury. Normal range of motion. Cervical back: Normal range of motion and neck supple. Right lower leg: No edema. Left lower leg: No edema. Skin:     General: Skin is warm. Coloration: Skin is not jaundiced or pale. Neurological:      General: No focal deficit present. Mental Status: She is alert and oriented to person, place, and time. Cranial Nerves: No cranial nerve deficit. LABS:    CBC:   Recent Labs     02/12/22  0821 02/12/22  1353 02/12/22  1746 02/12/22  2334 02/13/22  0511   WBC 12.1*  --  13.8*  --  11.5*   HGB 7.7*   < > 8.9*  8.9* 8.7* 8.6*   HCT 23.8*   < > 27.0*  26.7* 26.0* 25.4*   PLT 42*  --  44*  --  41*   .5*  --  103.3*  --  100.6*    < > = values in this interval not displayed.      Renal:    Recent Labs     02/11/22  0416 02/12/22  0821 02/13/22  0511   * 136 136   K 4.5 3. 4* 3.5    100 98*   CO2 21 27 30   BUN 33* 26* 21*   CREATININE 0.7 0.7 0.6   GLUCOSE 161* 124* 127*   CALCIUM 8.9 8.5 8.5   MG  --  1.60* 1.60*   ANIONGAP 11 9 8     Hepatic:   No results for input(s): AST, ALT, BILITOT, BILIDIR, PROT, LABALBU, ALKPHOS in the last 72 hours. Troponin:   Recent Labs     02/10/22  1410 02/11/22  0416   TROPONINI 1.21* 0.96*     BNP: 3,999 -> 26,679 (2/11)    Lactate (2/9/22): 1.7      Cultures: legionella presumptive negative, BCx x2 NGTD, respiratory Cx with 3+ epithelial cells, 1+ ciliated epithelial cells, 2+ G- rods, 3+ G+ cocci, 1+ yeast.    -----------------------------------------------------------------      RAD:   CTA CHEST ABDOMEN PELVIS W CONTRAST   Final Result   1. No evidence of abdominal aortic aneurysm, dissection or intramural hematoma   2. Normal thoracic and abdominal aortic diameter   3. Bilateral lower lobe basilar pneumonia and effusions   4. Colonic diverticulosis   5. Porcelain gallbladder              Assessment/Plan:   Ms. Keenan Zuluaga is an 78year old female with a PMH of JONATHAN 2/2 chronic blood loss, MDS, systolic CHF, Afib on Eliquis, GIB, glaucoma, HLD and HTN who presents as a direct transfer from [facility] ED initially presenting with N/V, CP and abdominal pain.      Chest pain  N/V  Acute coronary syndrome  NSTEMI  Tn 0.04 at OSH, uptrended to 1.21 before downtrending to 0.96. S/p cath, stenting done 2/11/22.   - Continue metoprolol 37.5 mg BID  - Telemetry  - Plavix, ASA 81mg  - Lipitor 80mg QHS  - Morphine for CP relief  - Topical nitrates for CP per cardio    Abdominal pain/cramping  History of esophageal spasm  Gastric ulcer  Porcelain gallbladder  EGD in 2012 showing gastric ulcer, gastritits.  Esophageal spasm on prior recent hospitalization.  - Protonix 40 BID  - GI on board    Afib  - Started Eliquis this morning  - Metoprolol 37.5mg BID     Macrocytic anemia  Myelodysplastic syndrome (MDS)  Thrombocytopenia  May be a component of MDS, blood loss and/or vitamin deficiency to her anemia. Patient has a history of GIB and states she bleeds easily with trauma. Patient was given a unit of PRBCs in the OSH due to hgb of 5.5. FOBT was negative at OSH on admission. Immature reticulocyte fraction 0.68. Hgb 8.4 this AM. No schistocytes on peripheral blood smear (PBS). Occasional poikilocytes, polychromasia, tear drop cells, anisocytosis, hyperchromasia, ovalocytes, macrocytes and microcytes on PBS with 1+ hypochromia & 1% myelocytes & relative metamyelocytes. Folate 14.97, vit B12 707, ferritin 359.2.  Received 1U PRBCs overnight due to hgb of 7.8.  - Platelet this morning 41 this morning, we will give 1 U of platelet   - Monitor H&H Q8H  - Transfuse for less than 8 hgb  - Heme/onc on board   - Stop Hydrea for now per oncology  - Bone marrow bx outpt per oncology     Pulmonary edema likely 2/2 CHF  As seen on CXR (2/9/22), unlikely to be PNA, patient denies chills/fever, no cough or leukocytosis  - Continue to monitor fluid status  - Strict I/O  - daily weight     Chronic issues:  HTN - home metoprolol  Dyslipidemia - Total chol 110, HDL 38, LDL 53, VLDL 19, TG 96        Code Status: Full code  FEN: adult regular diet, low fat/chol/high fiber/RENATO  PPX: ASA, plavix  DISPO: Jose Damon MD, PGY-2  02/13/22  9:17 AM    This patient has been staffed and discussed with Reny Rodriguez MD.

## 2022-02-13 NOTE — PLAN OF CARE
Problem: Falls - Risk of:  Goal: Will remain free from falls  Description: Will remain free from falls  Outcome: Met This Shift  Note: Pt medium-high fall risk. All fall prevention measures in use. Pt and family educated of measures. Will continue to  monitor. Problem: HEMODYNAMIC STATUS  Goal: Patient has stable vital signs and fluid balance  Outcome: Ongoing  Note: BP (!) 113/55   Pulse 103   Temp 97.9 °F (36.6 °C) (Axillary)   Resp 21   Ht 5' 1\" (1.549 m)   Wt 141 lb 1.5 oz (64 kg)   SpO2 93%   BMI 26.66 kg/m²   Purwick placed for accurate I&O, 2 missed occurences and 350ML total thus far into shift.

## 2022-02-13 NOTE — PROGRESS NOTES
CC:  HPI:     S: C/o LI and fatigue. Denies cp. No gross GI/ bleeding. Tele:Sinus     O:  Physical Exam:  BP (!) 104/49   Pulse 93   Temp 97.4 °F (36.3 °C) (Oral)   Resp 16   Ht 5' 1\" (1.549 m)   Wt 141 lb 1.5 oz (64 kg)   SpO2 92%   BMI 26.66 kg/m²    General (appearance):  No acute distress  Eyes: anicteric   Neck: soft, No JVD  Ears/Nose/Mouth/Thorat: No cyanosis  CV: RRR   Respiratory:  Clear, normal effort  GI: soft, non-tender, non-distended  Skin: Warm, dry. No rashes  Neuro/Psych: Alert and oriented x 3. Appropriate behavior  Ext:  No c/c. No significant edema  Pulses:  2+ right femoral. Right groin soft, no hematoma. I.O's= +1.2 L     Weight  Admission: Weight: 130 lb (59 kg)   Today: Weight: 141 lb 1.5 oz (64 kg)    CBC:   Recent Labs     22  0821 22  1353 22  1746 22  2334 22  0511   WBC 12.1*  --  13.8*  --  11.5*   HGB 7.7*   < > 8.9*  8.9* 8.7* 8.6*   HCT 23.8*   < > 27.0*  26.7* 26.0* 25.4*   .5*  --  103.3*  --  100.6*   PLT 42*  --  44*  --  41*    < > = values in this interval not displayed. BMP:   Recent Labs     22  0416 22  0821 22  0511   * 136 136   K 4.5 3.4* 3.5    100 98*   CO2 21 27 30   BUN 33* 26* 21*   CREATININE 0.7 0.7 0.6     Mag:   Lab Results   Component Value Date    MG 1.60 2022     Pro-BNP:   Lab Results   Component Value Date    PROBNP 26,679 2022    PROBNP 3,999 2022     CARDIAC ENZYMES:   Recent Labs     02/10/22  1410 22  0416   TROPONINI 1.21* 0.96*         Imagin2022 Coronary angiogram   LAD mid vessel 70% diffuse disease after second diagonal branch.  MABEL grade III flow present.  Second diagonal branch ostial 99% stenosis mid vessel 99% stenosis diffuse disease in between. Right coronary artery collateralized distal right coronary occluded  Circumflex and obtuse marginal branches with diffuse moderate disease.   LVEDP 20 mmHg LVEF estimated at 40% with diffuse hypokinesia more pronounced in the anterior apex  Diagonal 99% stented 2.5 x 18 mm [place] CECIL    2/9/2022 CTA chest  1. No evidence of abdominal aortic aneurysm, dissection or intramural hematoma   2. Normal thoracic and abdominal aortic diameter   3. Bilateral lower lobe basilar pneumonia and effusions   4. Colonic diverticulosis   5. Porcelain gallbladder     2/9/2022 CT abd/pelvis  Congestive heart failure.       Small hiatal hernia.       Porcelain gallbladder.       Evidence of old granulomatous disease.       Mild sigmoid diverticulosis.       Soft tissue prominence incompletely imaged at the anal rectal junction   region.  Correlate clinically. 9/30/2021 Echo:  Left ventricular systolic function is normal with ejection fraction   estimated at 55-60 %. No regional wall motion abnormalities are noted. There is mild concentric left ventricular hypertrophy. Normal left ventricular diastolic filling pressure. Mild anterior and posterior mitral annular calcification is present. Moderate mitral regurgitation. The appears to be a small calcified echogenic mass near LVOT, likely   represents degenerative valve disease, and is less likely a vegetation. Study was compared to 2014 echo and the LVOT mass was not present in 2014. Mild aortic regurgitation is present. Moderate tricuspid regurgitation. Normal systolic pulmonary artery pressure (SPAP) estimated at 32 mmHg (RA   pressure 3 mmHg). Mild pulmonic regurgitation present. 1/23/2014 55% EF, mild MR. Assessment:  78 y. o. with   Issues:  -Unstable angina  -CAD s/p PCI   -Acute combined systolic and diastolic CHF  -myelodysplastic syndrome  -Thrombocytopenia  -Anemia   -hx GI bleed. Plan:  -Keep K>4, Mg>2. And Hgb > 8  -Plavix  -ASA  -Lipitor  -Metoprolol   -Nitro.  Hold for SBP < 90 mmHg  -Lasix 40 mg IV BID

## 2022-02-14 LAB
ANION GAP SERPL CALCULATED.3IONS-SCNC: 9 MMOL/L (ref 3–16)
BASOPHILS ABSOLUTE: 0 K/UL (ref 0–0.2)
BASOPHILS ABSOLUTE: 0 K/UL (ref 0–0.2)
BASOPHILS RELATIVE PERCENT: 0.1 %
BASOPHILS RELATIVE PERCENT: 0.3 %
BLOOD CULTURE, ROUTINE: NORMAL
BUN BLDV-MCNC: 18 MG/DL (ref 7–20)
CALCIUM SERPL-MCNC: 8.6 MG/DL (ref 8.3–10.6)
CHLORIDE BLD-SCNC: 94 MMOL/L (ref 99–110)
CO2: 31 MMOL/L (ref 21–32)
CREAT SERPL-MCNC: 0.5 MG/DL (ref 0.6–1.2)
CULTURE, BLOOD 2: NORMAL
EOSINOPHILS ABSOLUTE: 0.2 K/UL (ref 0–0.6)
EOSINOPHILS ABSOLUTE: 0.2 K/UL (ref 0–0.6)
EOSINOPHILS RELATIVE PERCENT: 1.5 %
EOSINOPHILS RELATIVE PERCENT: 1.5 %
GFR AFRICAN AMERICAN: >60
GFR NON-AFRICAN AMERICAN: >60
GLUCOSE BLD-MCNC: 132 MG/DL (ref 70–99)
HCT VFR BLD CALC: 25.8 % (ref 36–48)
HCT VFR BLD CALC: 27.2 % (ref 36–48)
HEMOGLOBIN: 8.7 G/DL (ref 12–16)
HEMOGLOBIN: 9.1 G/DL (ref 12–16)
INFLUENZA A BY PCR: NOT DETECTED
INFLUENZA B BY PCR: NOT DETECTED
LV EF: 48 %
LVEF MODALITY: NORMAL
LYMPHOCYTES ABSOLUTE: 0.8 K/UL (ref 1–5.1)
LYMPHOCYTES ABSOLUTE: 1 K/UL (ref 1–5.1)
LYMPHOCYTES RELATIVE PERCENT: 7 %
LYMPHOCYTES RELATIVE PERCENT: 9.4 %
MAGNESIUM: 1.8 MG/DL (ref 1.8–2.4)
MCH RBC QN AUTO: 33.9 PG (ref 26–34)
MCH RBC QN AUTO: 34.1 PG (ref 26–34)
MCHC RBC AUTO-ENTMCNC: 33.6 G/DL (ref 31–36)
MCHC RBC AUTO-ENTMCNC: 33.6 G/DL (ref 31–36)
MCV RBC AUTO: 100.9 FL (ref 80–100)
MCV RBC AUTO: 101.5 FL (ref 80–100)
MONOCYTES ABSOLUTE: 0.6 K/UL (ref 0–1.3)
MONOCYTES ABSOLUTE: 0.8 K/UL (ref 0–1.3)
MONOCYTES RELATIVE PERCENT: 5.6 %
MONOCYTES RELATIVE PERCENT: 6.9 %
NEUTROPHILS ABSOLUTE: 8.9 K/UL (ref 1.7–7.7)
NEUTROPHILS ABSOLUTE: 9.6 K/UL (ref 1.7–7.7)
NEUTROPHILS RELATIVE PERCENT: 83.2 %
NEUTROPHILS RELATIVE PERCENT: 84.5 %
PDW BLD-RTO: 23.9 % (ref 12.4–15.4)
PDW BLD-RTO: 24 % (ref 12.4–15.4)
PLATELET # BLD: 55 K/UL (ref 135–450)
PLATELET # BLD: 58 K/UL (ref 135–450)
PMV BLD AUTO: 9.5 FL (ref 5–10.5)
PMV BLD AUTO: 9.6 FL (ref 5–10.5)
POC ACT LR: >400 SEC
POTASSIUM REFLEX MAGNESIUM: 4 MMOL/L (ref 3.5–5.1)
PRO-BNP: ABNORMAL PG/ML (ref 0–449)
RBC # BLD: 2.55 M/UL (ref 4–5.2)
RBC # BLD: 2.68 M/UL (ref 4–5.2)
RSV BY PCR: NOT DETECTED
RSV SOURCE: NORMAL
SODIUM BLD-SCNC: 134 MMOL/L (ref 136–145)
WBC # BLD: 10.7 K/UL (ref 4–11)
WBC # BLD: 11.4 K/UL (ref 4–11)

## 2022-02-14 PROCEDURE — 97530 THERAPEUTIC ACTIVITIES: CPT

## 2022-02-14 PROCEDURE — 97535 SELF CARE MNGMENT TRAINING: CPT

## 2022-02-14 PROCEDURE — 6370000000 HC RX 637 (ALT 250 FOR IP): Performed by: INTERNAL MEDICINE

## 2022-02-14 PROCEDURE — 97116 GAIT TRAINING THERAPY: CPT

## 2022-02-14 PROCEDURE — 2060000000 HC ICU INTERMEDIATE R&B

## 2022-02-14 PROCEDURE — 83735 ASSAY OF MAGNESIUM: CPT

## 2022-02-14 PROCEDURE — 6360000002 HC RX W HCPCS: Performed by: STUDENT IN AN ORGANIZED HEALTH CARE EDUCATION/TRAINING PROGRAM

## 2022-02-14 PROCEDURE — 2580000003 HC RX 258: Performed by: INTERNAL MEDICINE

## 2022-02-14 PROCEDURE — 6360000002 HC RX W HCPCS: Performed by: INTERNAL MEDICINE

## 2022-02-14 PROCEDURE — 36415 COLL VENOUS BLD VENIPUNCTURE: CPT

## 2022-02-14 PROCEDURE — 80048 BASIC METABOLIC PNL TOTAL CA: CPT

## 2022-02-14 PROCEDURE — 83880 ASSAY OF NATRIURETIC PEPTIDE: CPT

## 2022-02-14 PROCEDURE — 97166 OT EVAL MOD COMPLEX 45 MIN: CPT

## 2022-02-14 PROCEDURE — C9113 INJ PANTOPRAZOLE SODIUM, VIA: HCPCS | Performed by: STUDENT IN AN ORGANIZED HEALTH CARE EDUCATION/TRAINING PROGRAM

## 2022-02-14 PROCEDURE — 93306 TTE W/DOPPLER COMPLETE: CPT

## 2022-02-14 PROCEDURE — 99233 SBSQ HOSP IP/OBS HIGH 50: CPT | Performed by: INTERNAL MEDICINE

## 2022-02-14 PROCEDURE — 6370000000 HC RX 637 (ALT 250 FOR IP): Performed by: STUDENT IN AN ORGANIZED HEALTH CARE EDUCATION/TRAINING PROGRAM

## 2022-02-14 PROCEDURE — 97162 PT EVAL MOD COMPLEX 30 MIN: CPT

## 2022-02-14 PROCEDURE — 85025 COMPLETE CBC W/AUTO DIFF WBC: CPT

## 2022-02-14 RX ORDER — FUROSEMIDE 10 MG/ML
20 INJECTION INTRAMUSCULAR; INTRAVENOUS 2 TIMES DAILY
Status: DISCONTINUED | OUTPATIENT
Start: 2022-02-14 | End: 2022-02-14

## 2022-02-14 RX ORDER — FUROSEMIDE 40 MG/1
40 TABLET ORAL DAILY
Status: DISCONTINUED | OUTPATIENT
Start: 2022-02-14 | End: 2022-02-15 | Stop reason: HOSPADM

## 2022-02-14 RX ORDER — FUROSEMIDE 10 MG/ML
40 INJECTION INTRAMUSCULAR; INTRAVENOUS 2 TIMES DAILY
Status: DISCONTINUED | OUTPATIENT
Start: 2022-02-14 | End: 2022-02-14

## 2022-02-14 RX ORDER — MAGNESIUM SULFATE IN WATER 40 MG/ML
2000 INJECTION, SOLUTION INTRAVENOUS ONCE
Status: COMPLETED | OUTPATIENT
Start: 2022-02-14 | End: 2022-02-14

## 2022-02-14 RX ORDER — FUROSEMIDE 10 MG/ML
20 INJECTION INTRAMUSCULAR; INTRAVENOUS ONCE
Status: COMPLETED | OUTPATIENT
Start: 2022-02-14 | End: 2022-02-14

## 2022-02-14 RX ADMIN — FUROSEMIDE 20 MG: 10 INJECTION, SOLUTION INTRAMUSCULAR; INTRAVENOUS at 13:36

## 2022-02-14 RX ADMIN — CLOPIDOGREL 75 MG: 75 TABLET, FILM COATED ORAL at 10:47

## 2022-02-14 RX ADMIN — SERTRALINE HYDROCHLORIDE 50 MG: 50 TABLET ORAL at 10:47

## 2022-02-14 RX ADMIN — SODIUM CHLORIDE 25 ML: 9 INJECTION, SOLUTION INTRAVENOUS at 10:53

## 2022-02-14 RX ADMIN — PANTOPRAZOLE SODIUM 40 MG: 40 INJECTION, POWDER, FOR SOLUTION INTRAVENOUS at 21:49

## 2022-02-14 RX ADMIN — SODIUM CHLORIDE, PRESERVATIVE FREE 10 ML: 5 INJECTION INTRAVENOUS at 10:48

## 2022-02-14 RX ADMIN — APIXABAN 5 MG: 5 TABLET, FILM COATED ORAL at 21:49

## 2022-02-14 RX ADMIN — PANTOPRAZOLE SODIUM 40 MG: 40 INJECTION, POWDER, FOR SOLUTION INTRAVENOUS at 10:47

## 2022-02-14 RX ADMIN — SODIUM CHLORIDE, PRESERVATIVE FREE 10 ML: 5 INJECTION INTRAVENOUS at 21:51

## 2022-02-14 RX ADMIN — ATORVASTATIN CALCIUM 80 MG: 80 TABLET, FILM COATED ORAL at 21:49

## 2022-02-14 RX ADMIN — MAGNESIUM SULFATE HEPTAHYDRATE 2000 MG: 2 INJECTION, SOLUTION INTRAVENOUS at 10:57

## 2022-02-14 RX ADMIN — FUROSEMIDE 20 MG: 10 INJECTION, SOLUTION INTRAMUSCULAR; INTRAVENOUS at 17:50

## 2022-02-14 RX ADMIN — ASPIRIN 81 MG: 81 TABLET, CHEWABLE ORAL at 10:47

## 2022-02-14 RX ADMIN — FUROSEMIDE 40 MG: 40 TABLET ORAL at 17:50

## 2022-02-14 RX ADMIN — APIXABAN 5 MG: 5 TABLET, FILM COATED ORAL at 10:47

## 2022-02-14 RX ADMIN — ACETAMINOPHEN 650 MG: 325 TABLET ORAL at 03:16

## 2022-02-14 ASSESSMENT — PAIN DESCRIPTION - FREQUENCY: FREQUENCY: CONTINUOUS

## 2022-02-14 ASSESSMENT — ENCOUNTER SYMPTOMS
NAUSEA: 0
ABDOMINAL PAIN: 0
COUGH: 0
RHINORRHEA: 0
CHEST TIGHTNESS: 0
VOMITING: 0
BLOOD IN STOOL: 0
SHORTNESS OF BREATH: 0
WHEEZING: 0
DIARRHEA: 0
CONSTIPATION: 0
COLOR CHANGE: 0
ABDOMINAL DISTENTION: 0
SORE THROAT: 0

## 2022-02-14 ASSESSMENT — PAIN DESCRIPTION - PROGRESSION: CLINICAL_PROGRESSION: GRADUALLY IMPROVING

## 2022-02-14 ASSESSMENT — PAIN DESCRIPTION - ONSET: ONSET: ON-GOING

## 2022-02-14 ASSESSMENT — PAIN SCALES - WONG BAKER
WONGBAKER_NUMERICALRESPONSE: 0

## 2022-02-14 ASSESSMENT — PAIN SCALES - GENERAL
PAINLEVEL_OUTOF10: 0
PAINLEVEL_OUTOF10: 10
PAINLEVEL_OUTOF10: 0
PAINLEVEL_OUTOF10: 6
PAINLEVEL_OUTOF10: 0

## 2022-02-14 ASSESSMENT — PAIN DESCRIPTION - LOCATION: LOCATION: FOOT

## 2022-02-14 ASSESSMENT — PAIN - FUNCTIONAL ASSESSMENT: PAIN_FUNCTIONAL_ASSESSMENT: ACTIVITIES ARE NOT PREVENTED

## 2022-02-14 ASSESSMENT — PAIN DESCRIPTION - DESCRIPTORS: DESCRIPTORS: CRAMPING;DISCOMFORT

## 2022-02-14 ASSESSMENT — PAIN DESCRIPTION - PAIN TYPE: TYPE: ACUTE PAIN

## 2022-02-14 ASSESSMENT — PAIN DESCRIPTION - ORIENTATION: ORIENTATION: LEFT

## 2022-02-14 NOTE — PLAN OF CARE
Problem: Falls - Risk of:  Goal: Will remain free from falls  Description: Will remain free from falls  2/14/2022 1837 by Domingo Parra RN  Outcome: Ongoing  2/14/2022 0534 by Mali Zuñiga RN  Outcome: Ongoing  Note: Bed is locked and in the lowest position. Bed alarm is on.  Pt is alert and oriented x4 and educated to use call light for assistance      Problem: ACTIVITY INTOLERANCE/IMPAIRED MOBILITY  Goal: Mobility/activity is maintained at optimum level for patient  Outcome: Ongoing  Note: Patient tolerated walking to bathroom during my shift     Problem: HEMODYNAMIC STATUS  Goal: Patient has stable vital signs and fluid balance  Note: Patient started on PO lasix during my shift

## 2022-02-14 NOTE — PROGRESS NOTES
[facility] Daily Progress Note      Admit Date:  2/9/2022    CC:  CAD follow up    Subjective:  Ms. Molly Saunders feels much better. No CP no Nausea. Very ! Objective:   BP (!) 95/47   Pulse 96   Temp 98 °F (36.7 °C) (Oral)   Resp 20   Ht 5' 1\" (1.549 m)   Wt 141 lb 1.5 oz (64 kg)   SpO2 93%   BMI 26.66 kg/m²     Intake/Output Summary (Last 24 hours) at 2/14/2022 1654  Last data filed at 2/14/2022 1516  Gross per 24 hour   Intake 420 ml   Output 925 ml   Net -505 ml       TELEMETRY: Sinus  Physical Exam:  General:  Awake, alert, NAD  Eyes:  EOMI PERRL anicteric  Skin:  Warm and dry. Neck:  JVP normal  Chest:  Diminshed. No Rales. Cardiovascular:   RRR, Normal S1S2. No Murmur. No Rub. No Gallops. Abdomen:  Soft NT  + bs  Extremities:  No edema  Neuro:  CN II-XII intact. No focal deficits. No weakness. No lateralizing findings. Psych:  Normal thought and affect. MSK:  No Cyanosis nor Clubbing.   Symmetrical strength upper and lower extremities    Medications:    furosemide  40 mg Oral Daily    furosemide  20 mg IntraVENous Once    apixaban  5 mg Oral BID    sertraline  50 mg Oral Daily    sodium chloride flush  5-40 mL IntraVENous 2 times per day    atorvastatin  80 mg Oral Nightly    clopidogrel  75 mg Oral Daily    aspirin  81 mg Oral Daily    nitroglycerin  0.5 inch Topical 4 times per day    pantoprazole  40 mg IntraVENous BID      sodium chloride      sodium chloride      sodium chloride 25 mL (02/14/22 1053)    sodium chloride       sodium chloride, sodium chloride, perflutren lipid microspheres, sodium chloride flush, sodium chloride, acetaminophen, ondansetron, sodium chloride flush, ondansetron **OR** ondansetron, polyethylene glycol, acetaminophen **OR** acetaminophen, sodium chloride    Lab Data:  CBC:   Recent Labs     02/13/22  0511 02/13/22  1739 02/14/22  0545   WBC 11.5* 12.3* 11.4*   HGB 8.6* 9.3* 8.7*   HCT 25.4* 27.9* 25.8*   .6* 101.6* 100.9*   PLT 41* 66* 55*     BMP:   Recent Labs     02/12/22  0821 02/13/22  0511 02/14/22  0545    136 134*   K 3.4* 3.5 4.0    98* 94*   CO2 27 30 31   BUN 26* 21* 18   CREATININE 0.7 0.6 0.5*     LIVER PROFILE: No results for input(s): AST, ALT, LIPASE, BILIDIR, BILITOT, ALKPHOS in the last 72 hours. Invalid input(s): AMYLASE,  ALB  PT/INR: No results for input(s): PROTIME, INR in the last 72 hours. 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 CAD:  Stabilized with one CECIL to diagonal.  Would prefer no transfusions until Hb < 7.0 or plts < 20.    2. HF:  Try one dose IV lasix   3. Hyponatremia:  HF lasix 20 mg x 1.       Core Measures:  · Discharge instructions:   · LVEF documented:   · ACEI for LV dysfunction:   · Smoking Cessation:    Divine Conner MD, MD 2/14/2022 4:54 PM

## 2022-02-14 NOTE — CARE COORDINATION
Case Management Assessment           Daily Note                 Date/ Time of Note: 2/14/2022 3:14 PM         Note completed by: Dora Espinosa RN    Patient Name: Shemar Ma  YOB: 1940    Danyel Benson [D64.9]  Patient Admission Status: Inpatient    Date of Admission:2/9/2022  5:17 PM Length of Stay: 5 GLOS: GMLOS: 3.7    Current Plan of Care: weaning O2, diuresing   ________________________________________________________________________________________  PT AM-PAC: 21 / 24 per last evaluation on: 2/14    OT AM-PAC: 21 / 24 per last evaluation on: 2/14    DME Needs for discharge: n/a  ________________________________________________________________________________________  Discharge Plan: Home with [address] [place] [address] Way: tbd    Tentative discharge date: 2/15    Current barriers to discharge: medical clearance      ________________________________________________________________________________________  Case Management Notes: Patient is from home alone, spouse passed away recently. Patient is agreeable to [facility], no preference to agency. Patient states she can take care of herself, but does not want to be home alone. CM spoke with patient's daughter Polo Germain who lives near by, but has a broken ankle. Polo Germain is reaching out to siblings to decide what to do to help pt. CM explained that her insurance would not pay for inpatient stay, could give her resources for private duty and suggested COA. Yair Garcia and her family were provided with choice of provider; she and her family are in agreement with the discharge plan.     Care Transition Patient: Eva Espinosa RN  [facility] [employer]  Case Management Department  Ph: [phone]  Fax: [phone]

## 2022-02-14 NOTE — PROGRESS NOTES
Physical Therapy    Facility/Department: [place] [address] [facility]  Initial Assessment    NAME: Aminah Borrego  : 1940  MRN: [id number]    Date of Service: 2022    Discharge Recommendations:  Aminah Borrego scored a 20/24 on the AM-PAC short mobility form. Current research shows that an AM-PAC score of 18 or greater is typically associated with a discharge to the patient's home setting. Based on the patient's AM-PAC score and their current functional mobility deficits, it is recommended that the patient have 2-3 sessions per week of Physical Therapy at d/c to increase the patient's independence. At this time, this patient demonstrates the endurance and safety to discharge home with home PT and a follow up treatment frequency of 2-3x/wk. Please see assessment section for further patient specific details. Patient would benefit from initial 24 hour (A) upon d/c pending progress. If patient discharges prior to next session this note will serve as a discharge summary. Please see below for the latest assessment towards goals. HOME HEALTH CARE: LEVEL 1 STANDARD    - Initial home health evaluation to occur within 24-48 hours, in patient home   - Therapy to evaluate with goal of regaining prior level of functioning   - Therapy to evaluate if patient has [id number] needs for personal care      PT Equipment Recommendations  Equipment Needed: No  Other: owns RW and cane    Assessment   Body structures, Functions, Activity limitations: Decreased functional mobility ; Decreased strength;Decreased endurance;Decreased balance  Assessment: Patient demonstrates impaired functional mobility, presenting slightly below baseline, now requiring (S)/SBA for safe standing mobility. Patient limited by low endurance and decreased O2 saturation with mobility. Patient typically lives alone, trying to plan for 24 hour (A) upon d/c.  Patient will continue to benefit from additional skilled PT intervention to facilitate safe mobility and to optimize (I) to promote return to prior level of function. Treatment Diagnosis: impaired functional mobility  Prognosis: Good  Decision Making: Medium Complexity  Patient Education: Patient educated on role of PT, use of call light, energy conservation/pursed lip breathing, and PT recommendations - patient verbalizes understanding. Barriers to Learning: [place]  REQUIRES PT FOLLOW UP: Yes  Activity Tolerance  Activity Tolerance: Patient limited by endurance  Activity Tolerance: O2 saturation on room air 85-94% throughout session - RN aware       Patient Diagnosis(es): There were no encounter diagnoses. has a past medical history of Allergic rhinitis, CAD S/P percutaneous coronary angioplasty, Cancer ([facility] [address] GI bleed, Glaucoma, Hyperlipidemia, Hypertension, Myelodysplastic syndrome ([facility] [address] Radiation therapy complication, and UTI (lower urinary tract infection). has a past surgical history that includes Cataract removal with implant; Breast surgery (1999); Hysterectomy; and knee surgery (2/6/2012). Restrictions  Restrictions/Precautions  Restrictions/Precautions: Fall Risk (high fall risk)  Position Activity Restriction  Other position/activity restrictions: strict I&O, up with assistance, adult diet - regular, low fat/low chol/high fiber/RENATO  Vision/Hearing  Vision: Impaired (glaucoma (B) eyes)  Vision Exceptions: Wears glasses for reading  Hearing: Exceptions to [facility]  Hearing Exceptions: Hard of hearing/hearing concerns     Subjective  General  Chart Reviewed:  Yes  Additional Pertinent Hx: ED 2/9 related to nausea, vomiting, chest pain, abdominal pain, acute coronary syndrome, NSTEMI, pulmonary edema likely secondary to CHF exacerbation, cardiac stent x 2 2/11; PMH: JONATHAN secondary to chronic blood loss, MDS, systolic CHF, Afib, GIB, glaucoma, HTN  Family / Caregiver Present: No  Referring Practitioner: Ai Peterson MD  Referral Date : 02/12/22  Diagnosis: anemia  Follows Commands: Within Functional or dyspnea, no LI noted  Distance: 60ft  Comments: O2 saturation on room air 85-94% throughout session although with questionable pleth immediately after mobility - RN aware     Balance  Posture: Fair (rounded shoulders, forward head)  Sitting - Static: Good  Sitting - Dynamic: Good  Standing - Static: Good;-  Standing - Dynamic: Fair;+  Comments: (S)/SBA with RW for standing balance during mobility        Plan   Plan  Times per week: 2-5  Current Treatment Recommendations: Strengthening,ROM,Balance Training,Functional Mobility Training,Transfer Training,Gait Training,Stair training,Endurance Training,Home Exercise Program,Safety Education & Training,Patient/Caregiver Education & Training,Equipment Evaluation, Education, & procurement  Safety Devices  Type of devices: Call light within reach,Chair alarm in place,Gait belt,Left in chair,Nurse notified      AM-PAC Score  AM-PAC Inpatient Mobility Raw Score : 20 (02/14/22 1209)  AM-PAC Inpatient T-Scale Score : 47.67 (02/14/22 1209)  Mobility Inpatient CMS 0-100% Score: 35.83 (02/14/22 1209)  Mobility Inpatient CMS G-Code Modifier : Thornell Lesches (02/14/22 1209)          Goals  Short term goals  Time Frame for Short term goals: discharge  Short term goal 1: Pt. will demonstrate (I) bed mobility  Short term goal 2: Pt. will demonstrate sit <-> stand modified (I) with LRAD  Short term goal 3: Pt. will demonstrate stand pivot modified (I) with LRAD  Short term goal 4: Pt. will ambulate >/= 100ft with LRAD modified (I)  Short term goal 5: Pt. will negotiate >/= 3 stairs with LRAD and SBA to enter/exit home  Patient Goals   Patient goals : \"to go home\"       Therapy Time   Individual Concurrent Group Co-treatment   Time In 1134         Time Out 1212         Minutes 38                 Timed Code Treatment Minutes: 23 minutes    Total Treatment Minutes: 38 Minutes    If patient discharges prior to next treatment, this note will serve as discharge summary.     Mynor Josepherfield PT, DPT #[id number]

## 2022-02-14 NOTE — PROGRESS NOTES
Progress Note    Admit Date: 2/9/2022  Day: 6  Diet: ADULT DIET; Regular; Low Fat/Low Chol/High Fiber/RENATO    CC: N/V, CP    Interval history: Yesterday, the patient's pro-BNP decreased to 19,912. She received 1U platelets overnight due to plt ct of 41. Overnight, hgb was stable at 8.7, platelets 55. Today, the patient states she feels much better. She denies CP, N/V/D/C, abdominal pain. She is no longer in Afib. Oxygen was at 4L when I first examined her however this was weaned to no oxygen and she held at around 93% O2 saturation about 5 minutes later. She had a muscle cramp in the sole of her right foot yesterday but this has mostly resolved, likely related to diuretic. She seems euvolemic to me today. Will decrease lasix from 40mg BID to 20mg BID. Awaiting echo read - pending read, patient appears close to being ready for discharge. HPI: Ms. Leon Rodriguez is an 78year old female with a PMH of JONATHAN 2/2 chronic blood loss, MDS, systolic CHF, Afib on Eliquis, GIB, glaucoma, HTN who presents as a direct transfer from [facility] ED initially presenting with N/V, CP and abdominal pain. She states her CP, which radiates to between her shoulder blades, started about a week ago and was followed yesterday by nausea with predominant retching that is relieved by vomiting. She states she quit smoking about 20 years ago and that she rarely drinks and denies using narcotics or other drugs. She states that about a year ago she was seen in a hospital in [place] while visiting primarily due to respiratory distress and during that 7-8 day visit, she was diagnosed with porcelain gallbladder, and a \"heart infection\" though she does not know the details.      In the [facility] ED, the patient was noted to be hypoxic to 86-88% on RA and to have 10/10 epigastric pain and nausea; she was given antiemetics and dilaudid. CT read as heart failure, followed-up with a CXR, and BNP was 3,999; coarse rales on pulmonary auscultation;  Tn 0.04, no acute ischemia on EKG. Medications:     Scheduled Meds:   furosemide  20 mg IntraVENous BID    apixaban  5 mg Oral BID    sertraline  50 mg Oral Daily    sodium chloride flush  5-40 mL IntraVENous 2 times per day    atorvastatin  80 mg Oral Nightly    clopidogrel  75 mg Oral Daily    aspirin  81 mg Oral Daily    nitroglycerin  0.5 inch Topical 4 times per day    pantoprazole  40 mg IntraVENous BID     Continuous Infusions:   sodium chloride      sodium chloride      sodium chloride      sodium chloride       PRN Meds:sodium chloride, sodium chloride, perflutren lipid microspheres, sodium chloride flush, sodium chloride, acetaminophen, ondansetron, sodium chloride flush, ondansetron **OR** ondansetron, polyethylene glycol, acetaminophen **OR** acetaminophen, sodium chloride    Objective:   Vitals:   T-max:  Patient Vitals for the past 8 hrs:   BP Temp Temp src Pulse Resp SpO2 Weight   02/14/22 0617    76      02/14/22 0600       141 lb 1.5 oz (64 kg)   02/14/22 0447 (!) 97/58 97.7 °F (36.5 °C) Oral 81 17 95 %        Intake/Output Summary (Last 24 hours) at 2/14/2022 1001  Last data filed at 2/14/2022 4964  Gross per 24 hour   Intake 1263.33 ml   Output 1275 ml   Net -11.67 ml       Review of Systems   Constitutional: Negative for appetite change, chills and fever. HENT: Negative for congestion, hearing loss, rhinorrhea and sore throat. Eyes: Negative for visual disturbance. Respiratory: Negative for cough, chest tightness, shortness of breath and wheezing. Cardiovascular: Negative for chest pain, palpitations and leg swelling. Gastrointestinal: Negative for abdominal distention, abdominal pain, blood in stool, constipation, diarrhea, nausea and vomiting. Endocrine: Negative for polyuria. Genitourinary: Negative for difficulty urinating and dysuria. Musculoskeletal: Positive for myalgias (muscle cramp in the sole of the right foot yesterday night).  Negative for arthralgias. Skin: Negative for color change, pallor and rash. Neurological: Negative for dizziness, seizures, syncope, facial asymmetry, weakness, light-headedness and headaches. Psychiatric/Behavioral: Negative for behavioral problems, confusion and hallucinations. Physical Exam  Constitutional:       General: She is not in acute distress. Appearance: Normal appearance. She is not ill-appearing, toxic-appearing or diaphoretic. HENT:      Head: Normocephalic and atraumatic. Right Ear: External ear normal.      Left Ear: External ear normal.      Nose: Nose normal. No congestion or rhinorrhea. Mouth/Throat:      Mouth: Mucous membranes are moist.      Pharynx: Oropharynx is clear. Eyes:      Extraocular Movements: Extraocular movements intact. Conjunctiva/sclera: Conjunctivae normal.      Pupils: Pupils are equal, round, and reactive to light. Cardiovascular:      Rate and Rhythm: Normal rate and regular rhythm. Pulses: Normal pulses. Heart sounds: Normal heart sounds. No murmur heard. No friction rub. No gallop. Pulmonary:      Effort: Pulmonary effort is normal. No respiratory distress. Breath sounds: No wheezing, rhonchi or rales. Abdominal:      General: Abdomen is flat. Bowel sounds are normal. There is no distension. Palpations: Abdomen is soft. There is no mass. Tenderness: There is no abdominal tenderness. There is no guarding or rebound. Hernia: No hernia is present. Musculoskeletal:         General: No swelling, tenderness, deformity or signs of injury. Normal range of motion. Cervical back: Normal range of motion and neck supple. Right lower leg: No edema. Left lower leg: No edema. Skin:     General: Skin is warm and dry. Capillary Refill: Capillary refill takes less than 2 seconds. Coloration: Skin is not jaundiced or pale. Neurological:      General: No focal deficit present.       Mental Status: She is alert and oriented to person, place, and time. Mental status is at baseline. Cranial Nerves: No cranial nerve deficit. Psychiatric:         Mood and Affect: Mood normal.         Behavior: Behavior normal.         Thought Content: Thought content normal.         Judgment: Judgment normal.         LABS:    CBC:   Recent Labs     02/13/22  0511 02/13/22  1739 02/14/22  0545   WBC 11.5* 12.3* 11.4*   HGB 8.6* 9.3* 8.7*   HCT 25.4* 27.9* 25.8*   PLT 41* 66* 55*   .6* 101.6* 100.9*     Renal:    Recent Labs     02/12/22  0821 02/13/22  0511 02/14/22  0545    136 134*   K 3.4* 3.5 4.0    98* 94*   CO2 27 30 31   BUN 26* 21* 18   CREATININE 0.7 0.6 0.5*   GLUCOSE 124* 127* 132*   CALCIUM 8.5 8.5 8.6   MG 1.60* 1.60* 1.80   ANIONGAP 9 8 9     BNP: 3,999 -> 26,679 -> 19,912 (2/13)    Lactate (2/9/22): 1.7      Cultures: legionella presumptive negative, BCx x2 NGTD, respiratory Cx with 3+ epithelial cells, 1+ ciliated epithelial cells, 2+ G- rods, 3+ G+ cocci, 1+ yeast.    -----------------------------------------------------------------      RAD:   CTA CHEST ABDOMEN PELVIS W CONTRAST   Final Result   1. No evidence of abdominal aortic aneurysm, dissection or intramural hematoma   2. Normal thoracic and abdominal aortic diameter   3. Bilateral lower lobe basilar pneumonia and effusions   4. Colonic diverticulosis   5.  Porcelain gallbladder        9/30/2021 Echo:  Left ventricular systolic function is normal with ejection fraction   estimated at 55-60 %.   No regional wall motion abnormalities are noted.   There is mild concentric left ventricular hypertrophy.   Normal left ventricular diastolic filling pressure.   Mild anterior and posterior mitral annular calcification is present.   Moderate mitral regurgitation.   The appears to be a small calcified echogenic mass near LVOT, likely   represents degenerative valve disease, and is less likely a vegetation.   Study was compared to 2014 echo and the LVOT mass was not present in 2014.  Mayra Hammer aortic regurgitation is present.   Moderate tricuspid regurgitation.   Normal systolic pulmonary artery pressure (SPAP) estimated at 32 mmHg (RA pressure 3 mmHg).  Mild pulmonic regurgitation present.   1/23/2014 55% EF, mild MR. Assessment/Plan:   Ms. Ricky Mariano is an 78year old female with a PMH of JONATHAN 2/2 chronic blood loss, MDS, systolic CHF, Afib on Eliquis, GIB, glaucoma, HLD and HTN who presents as a direct transfer from [facility] initially presenting with N/V, CP and abdominal pain.      Chest pain  N/V  Acute coronary syndrome  NSTEMI  Acute HFpEF  Tn 0.04 at OSH, uptrended to 1.21 before downtrending to 0.96. S/p cath, stenting done 2/11/22. ECHO from 9/30/21 showing EF of 55-60%. Not requiring any oxygen during my evaluation. Patient had a muscle cramp last night likely related to diuresis. - Phenergan for nausea  - Telemetry  - Plavix, ASA 81mg  - Lipitor 80mg QHS  - Decrease Lasix from 40mg to 20mg IV BID  - Morphine for CP relief  - Topical nitrates for CP per cardio    Abdominal pain/cramping  History of esophageal spasm  Gastric ulcer  Porcelain gallbladder  EGD in 2012 showing gastric ulcer, gastritits. Esophageal spasm on prior recent hospitalization.  - Protonix 40 BID  - GI consulted, has signed off   - Semi-elective EGD, hold off for now given ACS & lack of active GIB per GI    Afib  On Eliquis at home. Restarted Eliquis yesterday. - Metoprolol 37.5mg BID  - Continue with Eliquis 5mg BID     Macrocytic anemia  Myelodysplastic syndrome (MDS)  Thrombocytopenia  May be a component of MDS, blood loss and/or vitamin deficiency to her anemia. Patient has a history of GIB and states she bleeds easily with trauma. Patient was given a unit of PRBCs in the OSH due to hgb of 5.5. FOBT was negative at OSH on admission. Immature reticulocyte fraction 0.68. Hgb 8.4 this AM. No schistocytes on peripheral blood smear (PBS).  Plt count 50 this AM. Occasional poikilocytes, polychromasia, tear drop cells, anisocytosis, hyperchromasia, ovalocytes, macrocytes and microcytes on PBS with 1+ hypochromia & 1% myelocytes & relative metamyelocytes. Folate 14.97, vit B12 707, ferritin 359.2. Received 1U PRBCs Friday 2/12/22 due to hgb of 7.8. Received 1U platelets overnight due to plt ct of 41.  - Monitor H&H Q8H  - Transfuse for less than 8 hgb  - Heme/onc consult, recs appreciated  - Stop Hydrea for now per oncology  - Bone marrow bx outpt per oncology     Pulmonary edema likely 2/2 CHF  As seen on CXR (2/9/22), unlikely to be PNA, patient denies chills/fever, no cough or leukocytosis. Patient appears euvolemic on exam. BNP 3,999 POA, today's BNP pending (last 23,416 yesterday). Last echo (9/30/21) with EF 55-60%. ECHO from today pending read.   - Continue to monitor fluid status     Chronic issues:  HTN - home metoprolol  Dyslipidemia - Total chol 110, HDL 38, LDL 53, VLDL 19, TG 96        Code Status: Full code  FEN: adult regular diet, low fat/chol/high fiber/RENATO  PPX: ASA, plavix  DISPO: HEIDI Escoto MD, PGY-1  02/14/22  10:01 AM    This patient has been staffed and discussed with Teri Matute MD.

## 2022-02-14 NOTE — PROGRESS NOTES
Occupational Therapy   Occupational Therapy Initial Assessment and Treatment Note   Date: 2022   Patient Name: Ford Maxwell  MRN: [id number]     : 1940    Date of Service: 2022    Discharge Recommendations:Cordelia Dubose scored a 21/24 on the AM-PAC ADL Inpatient form. Current research shows that an AM-PAC score of 18 or greater is typically associated with a discharge to the patient's home setting. Based on the patient's AM-PAC score, and their current ADL deficits, it is recommended that the patient have 2-3 sessions per week of Occupational Therapy at d/c to increase the patient's independence. At this time, this patient demonstrates the endurance and safety to discharge home with [facility] and a follow up treatment frequency of 2-3x/wk. Please see assessment section for further patient specific details. If patient discharges prior to next session this note will serve as a discharge summary. Please see below for the latest assessment towards goals. Home with Home health OT  OT Equipment Recommendations  Equipment Needed: No    Assessment   Performance deficits / Impairments: Decreased functional mobility ; Decreased endurance;Decreased ADL status  Assessment: Pt from home alone. Pt demo functioning slighlty below baseline with endurance and functional independence. Anticipate good progress toward goals. Pt would benefit from continued HHOT at d/c to maximize functional level. Will follow as inpt  Treatment Diagnosis: decreased endurance / LE ADLs 2/2 Anemia  Prognosis: Good  Decision Making: Medium Complexity  OT Education: OT Role;Plan of Care;IADL Safety  Patient Education: verb understanding  REQUIRES OT FOLLOW UP: Yes  Activity Tolerance  Activity Tolerance: Patient Tolerated treatment well  Activity Tolerance: Pt o2 sat on RA 93% prior to OOB / mobility-- when O2 obtained at rest 94-95%  Safety Devices  Safety Devices in place: Yes  Type of devices: Call light within reach; Chair alarm in place;Nurse notified; Left in chair           Patient Diagnosis(es): There were no encounter diagnoses. has a past medical history of Allergic rhinitis, CAD S/P percutaneous coronary angioplasty, Cancer ([facility] [address] GI bleed, Glaucoma, Hyperlipidemia, Hypertension, Myelodysplastic syndrome ([facility] [address] Radiation therapy complication, and UTI (lower urinary tract infection). has a past surgical history that includes Cataract removal with implant; Breast surgery (1999); Hysterectomy; and knee surgery (2/6/2012). Treatment Diagnosis: decreased endurance / LE ADLs 2/2 Anemia      Restrictions  Position Activity Restriction  Other position/activity restrictions: up with assistance    Subjective   General  Chart Reviewed: Yes  Additional Pertinent Hx: [facility] PR[facility]    cxray - Increased bilateral airspace disease,  CT Chest-Bilateral lower lobe basilar pneumonia and effusions, diverticulosis, cardiology consult  s/p Card cath 2/11 PTCA and stenting of diagonal branch #2 , required platelets/ 1 additional unit of PRBC's                               PMHX: CAD, HTN, HLD, breast cancer, MDS,glaucoma  Family / Caregiver Present: No  Diagnosis: Anemia  Subjective  Subjective: \" I feel better than I did 2 days ago \" Pt found in bed -- agreeable for OOB/OT eval and tx.     Social/Functional History  Social/Functional History  Lives With: Alone  Type of Home: House  Home Layout: One level  Home Access: Stairs to enter without rails  Entrance Stairs - Number of Steps: 3  Bathroom Shower/Tub: Walk-in shower  Bathroom Toilet: Standard  Bathroom Equipment: Toilet raiser,Grab bars in shower,Grab bars around toilet  Home Equipment: [address] Help From: Family (1 child lives close by)  ADL Assistance: [address]: Independent  Homemaking Responsibilities: No  Ambulation Assistance:  (cane when outside)  Transfer Assistance: Independent  Active : No  Occupation: Retired  Additional Comments: has cleaning lady x 1 month       Objective  Treatment included functional transfer training, ADL's and pt. education. Vision: Impaired (glaucoma BUE)  Vision Exceptions: Wears glasses for reading  Hearing: Exceptions to [facility]  Hearing Exceptions: Hard of hearing/hearing concerns    Orientation  Overall Orientation Status: Within Functional Limits     Balance  Sitting Balance: Independent  Standing Balance: Stand by assistance (to Supervision)  Standing Balance  Time: 3 mins x 2 and 4 mins x 2  Activity: functional transfers/ stance at sink/ functional mobility  Functional Mobility  Functional - Mobility Device: Rolling Walker  Activity: Other; To/from bathroom  Assist Level: Stand by assistance  Functional Mobility Comments: HHA w/o walker  -- pt reports first time up  since admission  Toilet Transfers  Toilet - Technique: Ambulating  Equipment Used: Standard toilet  Toilet Transfer: Supervision  Toilet Transfers Comments: with rail -- pt has raised seat with Omari arms  Shower Transfers  Shower Transfers Comments: Pt edu on having someone present for showering  ADL  Feeding: Independent;Setup  Grooming: Independent;Setup- stance at sink x 4mins   LE Dressing: Contact guard assistance donning briefs over feet / SBA in stance for over hips   Toileting: Contact guard assistance;Setup- performed charlotte care with setup / changed briefs with setup SBA in stance over hips.    Additional Comments: pt edu on sitting for LE ADLs - verb understanding  Tone RUE  RUE Tone: Normotonic  Tone LUE  LUE Tone: Normotonic  Coordination  Movements Are Fluid And Coordinated: Yes     Bed mobility  Supine to Sit: Supervision  Scooting: Independent  Transfers  Sit to stand: Supervision  Stand to sit: Supervision  Vision - Basic Assessment  Prior Vision: Wears glasses only for reading  Visual History: Glaucoma  Cognition  Overall Cognitive Status: WFL    LUE AROM (degrees)  LUE AROM : WFL  Left Hand AROM (degrees)  Left Hand AROM: WFL  RUE AROM (degrees)  RUE AROM : WFL  Right Hand AROM (degrees)  Right Hand AROM: WFL  LUE Strength  Gross LUE Strength: WFL  L Hand General: 4+/5  RUE Strength  Gross RUE Strength: WFL  R Hand General: 4+/5     Hand Dominance  Hand Dominance: Right     Plan   Plan  Times per week: 2-5x  Times per day: Daily  Current Treatment Recommendations: Functional Mobility Training,Endurance Training,Safety Education & Training,Self-Care / ADL,Equipment Evaluation, Education, & procurement,Patient/Caregiver Education & Training    AM-PAC Score  AM-[facility] Inpatient Daily Activity Raw Score: 21 (02/14/22 1049)  AM-PAC Inpatient ADL T-Scale Score : 44.27 (02/14/22 1049)  ADL Inpatient CMS 0-100% Score: 32.79 (02/14/22 1049)  ADL Inpatient CMS G-Code Modifier : Sallyantonio Molina (02/14/22 1049)    Goals  Short term goals  Time Frame for Short term goals: at d/c  Short term goal 1: Stance x 8 mins with mod independence with IADLs/ ADLs  Short term goal 2: LE Dressing with Mod [place]  Short term goal 3: Toileting with Mod independence  Short term goal 4: Chair pushups x 5 reps x 2 sets  Patient Goals   Patient goals : Go home / feel better     Therapy Time   Individual Concurrent Group Co-treatment   Time In 0956         Time Out 1051         Minutes 55              Timed Code Treatment Minutes:   39 mins     Total Treatment Minutes:  55 mins       Keagan Joshi OT

## 2022-02-14 NOTE — PLAN OF CARE
Problem: Pain:  Goal: Pain level will decrease  Description: Pain level will decrease  Outcome: Ongoing  Note: Pt started c/o pain in left foot, pt has been given tylenol and it has relieved some of the pain      Problem: Falls - Risk of:  Goal: Will remain free from falls  Description: Will remain free from falls  Outcome: Ongoing  Note: Bed is locked and in the lowest position. Bed alarm is on. Pt is alert and oriented x4 and educated to use call light for assistance      Problem: OXYGENATION/RESPIRATORY FUNCTION  Goal: Patient will maintain patent airway  Outcome: Ongoing  Note: Airway remains patent on 4L NC      Problem: Bleeding:  Goal: Will show no signs and symptoms of excessive bleeding  Description: Will show no signs and symptoms of excessive bleeding  Outcome: Ongoing  Note: No s/s of excess bleeding.

## 2022-02-15 VITALS
BODY MASS INDEX: 25.43 KG/M2 | OXYGEN SATURATION: 94 % | HEIGHT: 61 IN | WEIGHT: 134.7 LBS | HEART RATE: 117 BPM | DIASTOLIC BLOOD PRESSURE: 64 MMHG | RESPIRATION RATE: 26 BRPM | SYSTOLIC BLOOD PRESSURE: 97 MMHG | TEMPERATURE: 98.1 F

## 2022-02-15 LAB
ANION GAP SERPL CALCULATED.3IONS-SCNC: 11 MMOL/L (ref 3–16)
BASOPHILS ABSOLUTE: 0.1 K/UL (ref 0–0.2)
BASOPHILS RELATIVE PERCENT: 0.5 %
BUN BLDV-MCNC: 16 MG/DL (ref 7–20)
CALCIUM SERPL-MCNC: 8.9 MG/DL (ref 8.3–10.6)
CHLORIDE BLD-SCNC: 92 MMOL/L (ref 99–110)
CO2: 31 MMOL/L (ref 21–32)
CREAT SERPL-MCNC: 0.5 MG/DL (ref 0.6–1.2)
EOSINOPHILS ABSOLUTE: 0.2 K/UL (ref 0–0.6)
EOSINOPHILS RELATIVE PERCENT: 1.8 %
GFR AFRICAN AMERICAN: >60
GFR NON-AFRICAN AMERICAN: >60
GLUCOSE BLD-MCNC: 144 MG/DL (ref 70–99)
HAPTOGLOBIN: 73 MG/DL (ref 30–200)
HCT VFR BLD CALC: 28.4 % (ref 36–48)
HEMOGLOBIN: 9.6 G/DL (ref 12–16)
IRON SATURATION: 29 % (ref 15–50)
IRON: 73 UG/DL (ref 37–145)
LYMPHOCYTES ABSOLUTE: 1 K/UL (ref 1–5.1)
LYMPHOCYTES RELATIVE PERCENT: 10.2 %
MAGNESIUM: 1.8 MG/DL (ref 1.8–2.4)
MCH RBC QN AUTO: 34.6 PG (ref 26–34)
MCHC RBC AUTO-ENTMCNC: 33.9 G/DL (ref 31–36)
MCV RBC AUTO: 102.2 FL (ref 80–100)
MONOCYTES ABSOLUTE: 0.7 K/UL (ref 0–1.3)
MONOCYTES RELATIVE PERCENT: 6.5 %
NEUTROPHILS ABSOLUTE: 8.2 K/UL (ref 1.7–7.7)
NEUTROPHILS RELATIVE PERCENT: 81 %
PDW BLD-RTO: 24.6 % (ref 12.4–15.4)
PLATELET # BLD: 56 K/UL (ref 135–450)
PMV BLD AUTO: 9.4 FL (ref 5–10.5)
POTASSIUM REFLEX MAGNESIUM: 4 MMOL/L (ref 3.5–5.1)
RBC # BLD: 2.78 M/UL (ref 4–5.2)
SODIUM BLD-SCNC: 134 MMOL/L (ref 136–145)
TOTAL IRON BINDING CAPACITY: 255 UG/DL (ref 260–445)
WBC # BLD: 10.2 K/UL (ref 4–11)

## 2022-02-15 PROCEDURE — 6370000000 HC RX 637 (ALT 250 FOR IP): Performed by: INTERNAL MEDICINE

## 2022-02-15 PROCEDURE — 97530 THERAPEUTIC ACTIVITIES: CPT

## 2022-02-15 PROCEDURE — 2580000003 HC RX 258: Performed by: INTERNAL MEDICINE

## 2022-02-15 PROCEDURE — 80048 BASIC METABOLIC PNL TOTAL CA: CPT

## 2022-02-15 PROCEDURE — 97535 SELF CARE MNGMENT TRAINING: CPT

## 2022-02-15 PROCEDURE — 85025 COMPLETE CBC W/AUTO DIFF WBC: CPT

## 2022-02-15 PROCEDURE — 97116 GAIT TRAINING THERAPY: CPT

## 2022-02-15 PROCEDURE — 6360000002 HC RX W HCPCS: Performed by: STUDENT IN AN ORGANIZED HEALTH CARE EDUCATION/TRAINING PROGRAM

## 2022-02-15 PROCEDURE — 6370000000 HC RX 637 (ALT 250 FOR IP): Performed by: STUDENT IN AN ORGANIZED HEALTH CARE EDUCATION/TRAINING PROGRAM

## 2022-02-15 PROCEDURE — 36415 COLL VENOUS BLD VENIPUNCTURE: CPT

## 2022-02-15 PROCEDURE — 6360000002 HC RX W HCPCS: Performed by: INTERNAL MEDICINE

## 2022-02-15 PROCEDURE — 83735 ASSAY OF MAGNESIUM: CPT

## 2022-02-15 PROCEDURE — C9113 INJ PANTOPRAZOLE SODIUM, VIA: HCPCS | Performed by: STUDENT IN AN ORGANIZED HEALTH CARE EDUCATION/TRAINING PROGRAM

## 2022-02-15 RX ORDER — DIGOXIN 0.25 MG/ML
250 INJECTION INTRAMUSCULAR; INTRAVENOUS ONCE
Status: COMPLETED | OUTPATIENT
Start: 2022-02-15 | End: 2022-02-15

## 2022-02-15 RX ORDER — PANTOPRAZOLE SODIUM 40 MG/1
40 TABLET, DELAYED RELEASE ORAL
Qty: 60 TABLET | Refills: 0 | Status: SHIPPED | OUTPATIENT
Start: 2022-02-15

## 2022-02-15 RX ORDER — LISINOPRIL 2.5 MG/1
2.5 TABLET ORAL DAILY
Qty: 30 TABLET | Refills: 3 | Status: ON HOLD
Start: 2022-02-15 | End: 2022-03-19 | Stop reason: HOSPADM

## 2022-02-15 RX ORDER — METOPROLOL SUCCINATE 25 MG/1
25 TABLET, EXTENDED RELEASE ORAL DAILY
Qty: 30 TABLET | Refills: 3 | Status: SHIPPED | OUTPATIENT
Start: 2022-02-15 | End: 2022-03-09

## 2022-02-15 RX ORDER — LISINOPRIL 2.5 MG/1
2.5 TABLET ORAL DAILY
Status: DISCONTINUED | OUTPATIENT
Start: 2022-02-15 | End: 2022-02-15 | Stop reason: HOSPADM

## 2022-02-15 RX ORDER — ASPIRIN 81 MG/1
81 TABLET, CHEWABLE ORAL DAILY
Qty: 30 TABLET | Refills: 3 | Status: SHIPPED | OUTPATIENT
Start: 2022-02-15 | End: 2022-04-07

## 2022-02-15 RX ORDER — METOPROLOL SUCCINATE 25 MG/1
25 TABLET, EXTENDED RELEASE ORAL DAILY
Status: DISCONTINUED | OUTPATIENT
Start: 2022-02-15 | End: 2022-02-15 | Stop reason: HOSPADM

## 2022-02-15 RX ORDER — CLOPIDOGREL BISULFATE 75 MG/1
75 TABLET ORAL DAILY
Qty: 30 TABLET | Refills: 3 | Status: SHIPPED | OUTPATIENT
Start: 2022-02-15 | End: 2022-04-07 | Stop reason: SDUPTHER

## 2022-02-15 RX ORDER — FUROSEMIDE 40 MG/1
40 TABLET ORAL DAILY
Qty: 60 TABLET | Refills: 3 | Status: SHIPPED | OUTPATIENT
Start: 2022-02-15 | End: 2022-04-07 | Stop reason: SDUPTHER

## 2022-02-15 RX ORDER — ATORVASTATIN CALCIUM 80 MG/1
80 TABLET, FILM COATED ORAL NIGHTLY
Qty: 30 TABLET | Refills: 3 | Status: SHIPPED | OUTPATIENT
Start: 2022-02-15 | End: 2022-04-07 | Stop reason: SDUPTHER

## 2022-02-15 RX ADMIN — SODIUM CHLORIDE, PRESERVATIVE FREE 10 ML: 5 INJECTION INTRAVENOUS at 09:24

## 2022-02-15 RX ADMIN — LISINOPRIL 2.5 MG: 2.5 TABLET ORAL at 11:39

## 2022-02-15 RX ADMIN — DIGOXIN 250 MCG: 250 INJECTION, SOLUTION INTRAMUSCULAR; INTRAVENOUS; PARENTERAL at 11:54

## 2022-02-15 RX ADMIN — ASPIRIN 81 MG: 81 TABLET, CHEWABLE ORAL at 09:23

## 2022-02-15 RX ADMIN — METOPROLOL SUCCINATE 25 MG: 25 TABLET, EXTENDED RELEASE ORAL at 11:39

## 2022-02-15 RX ADMIN — CLOPIDOGREL 75 MG: 75 TABLET, FILM COATED ORAL at 09:23

## 2022-02-15 RX ADMIN — NITROGLYCERIN 0.5 INCH: 20 OINTMENT TOPICAL at 01:23

## 2022-02-15 RX ADMIN — PANTOPRAZOLE SODIUM 40 MG: 40 INJECTION, POWDER, FOR SOLUTION INTRAVENOUS at 09:23

## 2022-02-15 RX ADMIN — FUROSEMIDE 40 MG: 40 TABLET ORAL at 09:23

## 2022-02-15 RX ADMIN — APIXABAN 5 MG: 5 TABLET, FILM COATED ORAL at 09:23

## 2022-02-15 RX ADMIN — NITROGLYCERIN 0.5 INCH: 20 OINTMENT TOPICAL at 06:35

## 2022-02-15 RX ADMIN — NITROGLYCERIN 0.5 INCH: 20 OINTMENT TOPICAL at 11:39

## 2022-02-15 RX ADMIN — SERTRALINE HYDROCHLORIDE 50 MG: 50 TABLET ORAL at 09:23

## 2022-02-15 ASSESSMENT — PAIN SCALES - WONG BAKER: WONGBAKER_NUMERICALRESPONSE: 0

## 2022-02-15 ASSESSMENT — PAIN SCALES - GENERAL
PAINLEVEL_OUTOF10: 0

## 2022-02-15 NOTE — PROGRESS NOTES
Physical Therapy  Facility/Department: [place] [address] [facility]  Daily Treatment Note  NAME: Stefan Coronado  : 1940  MRN: [id number]    Date of Service: 2/15/2022    Discharge Recommendations:    Stefan Coronado scored a 20/24 on the AM-PAC short mobility form. Current research shows that an AM-PAC score of 18 or greater is typically associated with a discharge to the patient's home setting. Based on the patient's AM-PAC score and their current functional mobility deficits, it is recommended that the patient have 2-3 sessions per week of Physical Therapy at d/c to increase the patient's independence. At this time, this patient demonstrates the endurance and safety to discharge home with home PT and a follow up treatment frequency of 2-3x/wk. Please see assessment section for further patient specific details. PT Equipment Recommendations  Equipment Needed: No  Other: owns RW and cane    Assessment   Body structures, Functions, Activity limitations: Decreased functional mobility ; Decreased strength;Decreased endurance;Decreased balance  Assessment: Patient tolerated session well but ambulation distance limited due to patient reports being hungry and wanting to eat breakfast, declining further ambulation at this time. She demonstrates steady gait with no loss of balance noted and no dyspnea noted with mobility though pulse ox reading 87-94% on room air throughout session. Patient working with family to arrange for 24 hour supervision upon discharge. Recommend continued skilled PT. Will follow while in acute care setting to maximize independence with mobility. Treatment Diagnosis: impaired functional mobility  Prognosis: Good  PT Education: Goals; General Safety;Gait Training;PT Role;Plan of Care; Functional Mobility Training;Transfer Training  Patient Education: patient verbalized understanding, will continue to provide education throughout acute care stay  REQUIRES PT FOLLOW UP: Yes  Activity Tolerance  Activity Tolerance: Patient Tolerated treatment well;Patient limited by endurance     Patient Diagnosis(es): There were no encounter diagnoses. has a past medical history of Allergic rhinitis, CAD S/P percutaneous coronary angioplasty, Cancer ([facility] [address] GI bleed, Glaucoma, Hyperlipidemia, Hypertension, Myelodysplastic syndrome ([facility] [address] Radiation therapy complication, and UTI (lower urinary tract infection). has a past surgical history that includes Cataract removal with implant; Breast surgery (1999); Hysterectomy; and knee surgery (2/6/2012). Restrictions  Restrictions/Precautions  Restrictions/Precautions: Fall Risk (high fall risk)  Position Activity Restriction  Other position/activity restrictions: strict I&O, up with assistance, adult diet - regular, low fat/low chol/high fiber/RENATO  Subjective   General  Chart Reviewed: Yes  Additional Pertinent Hx: ED 2/9 related to nausea, vomiting, chest pain, abdominal pain, acute coronary syndrome, NSTEMI, pulmonary edema likely secondary to CHF exacerbation, cardiac stent x 2 2/11; PMH: JONATHAN secondary to chronic blood loss, MDS, systolic CHF, Afib, GIB, glaucoma, HTN  Response To Previous Treatment: Patient with no complaints from previous session.   Family / Caregiver Present: No  Referring Practitioner: Lincoln Snyder MD  Subjective  Subjective: Patient agreeable to PT treatment but declined further ambulation due to being hungry for breakfast.  General Comment  Comments: Patient up in bathroom with OT upon PT arrival.  Pain Screening  Patient Currently in Pain: Denies  Vital Signs  Patient Currently in Pain: Denies       Orientation  Orientation  Overall Orientation Status: Within Functional Limits  Cognition   Cognition  Overall Cognitive Status: WFL  Objective   Bed mobility  Comment: patient up in bathroom upon arrival, sitting up in bedside chair at end of session  Transfers  Sit to Stand: Supervision (from toilet and from bedside chair)  Stand to sit: Supervision (to bedside chair x 2 trials, verbal cues for safety and reach back)  Ambulation  Ambulation?: Yes  Ambulation 1  Surface: level tile  Device: Rolling Walker  Assistance: Supervision  Quality of Gait: slow mirian, steady, no loss of balance noted, decreased step length/height bilaterally  Distance: 30' in room returning to bedside chair  Comments: O2 saturation on room air 87-94% throughout session, no dypnea noted  Stairs/Curb  Stairs?: No     Balance  Sitting - Static: Good  Sitting - Dynamic: Good  Standing - Static: Fair;+ (supervision to stand at sink and perform grooming)  Standing - Dynamic: Fair;+ (supervision to ambulate with FWW)                        OutComes Score                                                     AM-PAC Score  AM-PAC Inpatient Mobility Raw Score : 20 (02/15/22 0935)  AM-PAC Inpatient T-Scale Score : 47.67 (02/15/22 0935)  Mobility Inpatient CMS 0-100% Score: 35.83 (02/15/22 0935)  Mobility Inpatient CMS G-Code Modifier : Chacha Roca (02/15/22 0935)          Goals  Short term goals  Time Frame for Short term goals: discharge - all ongoing 2/15  Short term goal 1: Pt. will demonstrate (I) bed mobility  Short term goal 2: Pt. will demonstrate sit <-> stand modified (I) with LRAD  Short term goal 3: Pt. will demonstrate stand pivot modified (I) with LRAD  Short term goal 4: Pt. will ambulate >/= 100ft with LRAD modified (I)  Short term goal 5: Pt. will negotiate >/= 3 stairs with LRAD and SBA to enter/exit home  Patient Goals   Patient goals : \"to go home\"    Plan    Plan  Times per week: 2-5  Current Treatment Recommendations: Strengthening,ROM,Balance Training,Functional Mobility Training,Transfer Training,Gait Training,Stair training,Endurance [employer] Exercise Program,Safety Education & Training,Patient/Caregiver Education & Training,Equipment Evaluation, Education, & procurement  Safety Devices  Type of devices: Call light within reach,Chair alarm in place,Gait belt,Left in chair,Nurse notified Therapy Time   Individual Concurrent Group Co-treatment   Time In 0903         Time Out 0919         Minutes 16         Timed Code Treatment Minutes: 16 Minutes     If patient discharges prior to next session this note will serve as a discharge summary. Please see below for the latest assessment towards goals.    Sergio WATSON [address]

## 2022-02-15 NOTE — CARE COORDINATION
Case Management Assessment            Discharge Note                    Date / Time of Note: 2/15/2022 11:36 AM                  Discharge Note Completed by: Zainab Manuel RN    Patient Name: Fiordaliza Ernst   YOB: 1940  Diagnosis: Anemia [D64.9]   Date / Time: 2/9/2022  5:17 PM    Current PCP: James Bishop DO  Clinic patient: No    Hospitalization in the last 30 days: No    Advance Directives:  Code Status: Full Code  [place] DNR form completed and on chart: Not Indicated    Financial:  Payor: Missy Trammell / Plan: Ruby Orr PPO / Product Type: Medicare /      Pharmacy:    [facility] [address], [address]  [address]  Phone: [phone] Fax: [phone]    [facility] [address], [address] [phone] [facility] [phone]  CHRISSY Oneil   Phone: [phone] Fax: [phone]      Assistance purchasing medications?: Potential Assistance Purchasing Medications: No  Assistance provided by Case Management: None at this time    Does patient want to participate in local refill/ meds to beds program?:      Meds To Beds General Rules:  1. Can ONLY be done Monday- Friday between 8:30am-5pm  2. Prescription(s) must be in pharmacy by 3pm to be filled same day  3. Copy of patient's insurance/ prescription drug card and patient face sheet must be sent along with the prescription(s)  4. Cost of Rx cannot be added to hospital bill. If financial assistance is needed, please contact unit  or ;  or  CANNOT provide pharmacy voucher for patients co-pays  5.  Patients can then  the prescription on their way out of the hospital at discharge, or pharmacy can deliver to the bedside if staff is available. (payment due at time of pick-up or delivery - cash, check, or card accepted)     Able to afford home medications/ co-pay costs: Yes    ADLS:  Current PT AM-PAC Score: 20 /24  Current OT AM-PAC Score: 21 /24      DISCHARGE Disposition: Home with Home Health Care: [facility]     LOC at discharge: Not Applicable  SILVIANO Completed: Not Indicated    Notification completed in HENS/PAS?:  Not Applicable    IMM Completed:   Yes, Case management has presented and reviewed IMM letter #2 to the patient and/or family/ POA. Patient and/or family/POA verbalized understanding of their medicare rights and appeal process if needed. Patient and/or family/POA has signed, initialed and placed today's date (2/15/22) and time (1200) on IMM letter #2 on the the appropriate lines. Patient and/or family/POA, copy of letter offered and they are aware that this original copy of IMM letter #2 is available prior to discharge from the paper chart on the unit. Electronic documentation has been entered into epic for IMM letter #2 and original paper copy has been added to the paper chart at the nurses station. Transportation:  Transportation PLAN for discharge: family   Mode of Transport: Private Car  Reason for medical transport: Not Applicable  Name of [address] [place] [address], [address]: Not [facility]:  [address] ordered at discharge: Yes  [address]: [address] [facility]  Phone: [phone] Fax: [phone]  Orders faxed: Yes    Durable Medical Equipment:  DME Provider: n/a  Equipment obtained during hospitalization: n/a    Home Oxygen and Respiratory Equipment:  Oxygen needed at discharge?: Not [address]: Not Applicable  Portable tank available for discharge?: Not Indicated      Referrals made at [facility] for outpatient continued care:  Not Applicable    Additional CM Notes:   Patient is from home alone, independent of ADL's. Patient will have [address] provided by [facility]. Daughter to transport home.        The Plan for Transition of Care is related to the following treatment goals of Anemia [D64.9]    The Patient and/or patient representative Minerva Pollock and her family were provided with a choice of provider and agrees with the discharge plan Yes    Freedom of choice list was provided with basic dialogue that supports the patient's individualized plan of care/goals and shares the quality data associated with the providers.  Yes    Care Transitions patient: No    Moni Horton RN  [facility] [employer]  Case Management Department  Ph: [phone]  Fax: [phone]

## 2022-02-15 NOTE — DISCHARGE SUMMARY
INTERNAL MEDICINE DEPARTMENT AT [address] [place] [address]  DISCHARGE SUMMARY    Patient ID: Dalton Gutiérrez                                             Discharge Date: 2/15/2022   Patient's PCP: Doris García DO                                          Discharge Physician: Divine Cantu MD MD  Admit Date: 2/9/2022   Admitting Physician: Gosia Baez MD    PROBLEMS DURING HOSPITALIZATION:  Present on Admission:   Symptomatic anemia   Chest pain      DISCHARGE DIAGNOSES:  1. 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 Non-ST segment elevation myocardial infarction (NSTEMI)  3. Gastric ulcer  4. Porcelain gallbladder  5. Atrial fibrillation  6. Macrocytic anemia  7. Myelodysplastic syndrome (MDS)  8. Thrombocytopenia  9. Pulmonary edema  10. Hypertension  11. Dyslipidemia    HPI: Ms. Dalton Gutiérrez is an 78year old female with a PMH of JONATHAN 2/2 chronic blood loss, MDS, systolic CHF, Afib on Eliquis, GIB, glaucoma, HTN who presents as a direct transfer from [facility] ED initially presenting with N/V, CP and abdominal pain. She states her CP, which radiates to between her shoulder blades, started about a week ago and was followed yesterday by nausea with predominant retching that is relieved by vomiting. She states she quit smoking about 20 years ago and that she rarely drinks and denies using narcotics or other drugs. She states that about a year ago she was seen in a hospital in [place] while visiting primarily due to respiratory distress and during that 7-8 day visit, she was diagnosed with porcelain gallbladder, and a \"heart infection\" though she does not know the details.      In the hospitals ED, the patient was noted to be hypoxic to 86-88% on RA and to have 10/10 epigastric pain and nausea; she was given antiemetics and dilaudid.  CT read as heart failure, followed-up with a CXR, and BNP was 3,999; coarse rales on pulmonary auscultation; Tn 0.04, no acute ischemia on EKG. The following issues were addressed during hospitalization:     Chest pain  N/V  Acute coronary syndrome  NSTEMI  Acute HFpEF  Tn 0.04 at OSH, uptrended to 1.21 before downtrending to 0.96. S/p cath, stenting done 2/11/22. ECHO from 9/30/21 showing EF of 55-60%. Not requiring any oxygen during my evaluation. Patient had a muscle cramp last night likely related to diuresis. - Phenergan for nausea  - Telemetry  - Plavix, ASA 81mg  - Lipitor 80mg QHS  - Decrease Lasix from 40mg to 20mg IV BID  - Morphine for CP relief  - Topical nitrates for CP per cardio     Abdominal pain/cramping  History of esophageal spasm  Gastric ulcer  Porcelain gallbladder  EGD in 2012 showing gastric ulcer, gastritits. Esophageal spasm on prior recent hospitalization.  - Protonix 40 BID  - GI consulted, has signed off   - Semi-elective EGD, hold off for now given ACS & lack of active GIB per GI     Afib  On Eliquis at home. Restarted Eliquis yesterday. - Metoprolol 37.5mg BID  - Continue with Eliquis 5mg BID     Macrocytic anemia  Myelodysplastic syndrome (MDS)  Thrombocytopenia  May be a component of MDS, blood loss and/or vitamin deficiency to her anemia. Patient has a history of GIB and states she bleeds easily with trauma. Patient was given a unit of PRBCs in the OSH due to hgb of 5.5. FOBT was negative at OSH on admission. Immature reticulocyte fraction 0.68. Hgb 8.4 this AM. No schistocytes on peripheral blood smear (PBS). Plt count 50 this AM. Occasional poikilocytes, polychromasia, tear drop cells, anisocytosis, hyperchromasia, ovalocytes, macrocytes and microcytes on PBS with 1+ hypochromia & 1% myelocytes & relative metamyelocytes. Folate 14.97, vit B12 707, ferritin 359.2. Received 1U PRBCs Friday 2/12/22 due to hgb of 7.8.  Received 1U platelets overnight due to plt ct of 41.  - Monitor H&H Q8H  - Transfuse for less than 8 hgb  - Heme/onc consult, recs appreciated  - Stop Hydrea for now per oncology  - Bone marrow bx outpt per oncology     Pulmonary edema likely 2/2 CHF  As seen on CXR (2/9/22), unlikely to be PNA, patient denies chills/fever, no cough or leukocytosis. Patient appears euvolemic on exam. BNP 3,999 POA, today's BNP pending (last 19,912 yesterday). Last echo (9/30/21) with EF 55-60%. ECHO from today pending read. - Continue to monitor fluid status     Chronic issues:  HTN - home metoprolol  Dyslipidemia - Total chol 110, HDL 38, LDL 53, VLDL 19, TG 96      Ms. rBidget Brown is discharged home with home health care today in stable medical condition. There have been changes to her medications as detailed below:    Please start taking the following medications:    Lisinopril 2.5 milligrams (mg) by mouth once daily    Metoprolol extended release 25 mg by mouth once daily    Acetyl salicylic acid (ASA, aspirin) 81 mg by mouth once daily    Atorvastatin (Lipitor) 80 mg by mouth once nightly    Clopidogrel (Plavix) 75 mg by mouth once daily    Furosemide (Lasix) 40 mg by mouth once daily    Pantoprazole (Protonix) 40 mg by mouth twice daily, before meals    Please stop taking the following medications:    Famotidine (Pepcid)    Metoprolol tartrate (Lopressor)    Please continue taking the rest of your medications as prescribed. We would like you to follow up with your primary care physician (PCP) Dr. Phu Kwok ([phone]) in 1 week, Pierre Hameed NP, in 1 week, your hematologist Dr. Candido Rivera ([phone]) in 2 weeks, your gastroenterologist, Dr. Ryan Boyd ([phone]) in 3 weeks, and your cardiologist Dr. Adi Le ([phone]) in 4 weeks. Physical Exam:  /67   Pulse 110   Temp 98.1 °F (36.7 °C) (Oral)   Resp 26   Ht 5' 1\" (1.549 m)   Wt 134 lb 11.2 oz (61.1 kg)   SpO2 94%   BMI 25.45 kg/m²     Physical Exam  Constitutional:       General: She is not in acute distress. Appearance: Normal appearance. She is not ill-appearing, toxic-appearing or diaphoretic.    HENT: Head: Normocephalic and atraumatic. Right Ear: External ear normal.      Left Ear: External ear normal.      Nose: Nose normal. No congestion or rhinorrhea. Mouth/Throat:      Mouth: Mucous membranes are moist.      Pharynx: Oropharynx is clear. Eyes:      Extraocular Movements: Extraocular movements intact. Conjunctiva/sclera: Conjunctivae normal.      Pupils: Pupils are equal, round, and reactive to light. Cardiovascular:      Rate and Rhythm: Normal rate and regular rhythm. Pulses: Normal pulses. Heart sounds: Normal heart sounds. No murmur heard. No friction rub. No gallop. Pulmonary:      Effort: Pulmonary effort is normal. No respiratory distress. Breath sounds: No wheezing or rhonchi. Some rales present in the right lung, lower > middle. Abdominal:      General: Abdomen is flat. Bowel sounds are normal. There is no distension. Palpations: Abdomen is soft. There is no mass. Tenderness: There is no abdominal tenderness. There is no guarding or rebound. Hernia: No hernia is present. Musculoskeletal:         General: No swelling, tenderness, deformity or signs of injury. Normal range of motion. Cervical back: Normal range of motion and neck supple. Right lower leg: No edema. Left lower leg: No edema. Skin:     General: Skin is warm and dry. Capillary Refill: Capillary refill takes less than 2 seconds. Coloration: Skin is not jaundiced or pale. Neurological:      General: No focal deficit present. Mental Status: She is alert and oriented to person, place, and time. Mental status is at baseline. Cranial Nerves: No cranial nerve deficit. Psychiatric:         Mood and Affect: Mood normal.         Behavior: Behavior normal.         Thought Content:  Thought content normal.         Judgment: Judgment normal.       Consults: cardiology, GI and hematology/oncology  Significant Diagnostic Studies:  CT angiogram scan Phone: [phone]   · aspirin 81 MG chewable tablet  · atorvastatin 80 MG tablet  · clopidogrel 75 MG tablet  · furosemide 40 MG tablet  · lisinopril 2.5 MG tablet  · metoprolol succinate 25 MG extended release tablet  · pantoprazole 40 MG tablet       Activity: activity as tolerated  Diet: cardiac diet  Wound Care: as directed    Time Spent on discharge is more than 30 minutes    Signed:  Celi Rondon MD, PGY-1  Internal Medicine Resident  2/15/2022

## 2022-02-15 NOTE — PROGRESS NOTES
Occupational Therapy  Facility/Department: [facility]  Daily Treatment Note  NAME: Fortino Mcclure  : 1940  MRN: [id number]    Date of Service: 2/15/2022    Discharge Recommendations:  Fortino Mcclure scored a 21/24 on the AM-PAC ADL Inpatient form. Current research shows that an AM-PAC score of 18 or greater is typically associated with a discharge to the patient's home setting. Based on the patient's AM-PAC score, and their current ADL deficits, it is recommended that the patient have 2-3 sessions per week of Occupational Therapy at d/c to increase the patient's independence. At this time, this patient demonstrates the endurance and safety to discharge  home with home services and a follow up treatment frequency of 2-3x/wk. Please see assessment section for further patient specific details. HOME HEALTH CARE: LEVEL 1 STANDARD    - Initial home health evaluation to occur within 24-48 hours, in patient home   - Therapy to evaluate with goal of regaining prior level of functioning   - Therapy to evaluate if patient has [address] needs for personal care    If patient discharges prior to next session this note will serve as a discharge summary. Please see below for the latest assessment towards goals. Home with Home health OT  OT Equipment Recommendations  Equipment Needed: No    Assessment   Performance deficits / Impairments: Decreased functional mobility ; Decreased endurance;Decreased ADL status  Assessment: Pt is pleasant and cooperativse. Pt used walker for mobility and req supervision. Cont OT tx per plan of care. Treatment Diagnosis: decreased endurance / LE ADLs 2/2 Anemia  Prognosis: Good  OT Education: OT Role;Plan of Care;Transfer Training  Patient Education: verb understanding  REQUIRES OT FOLLOW UP: Yes  Activity Tolerance  Activity Tolerance: Patient Tolerated treatment well  Activity Tolerance: O2 sats= 87-94% on room air throughout session.   Safety Devices  Safety Devices in place: Yes  Type of devices: Left in chair;Call light within reach; Chair alarm in place;Nurse notified (meal tray in place)       Restrictions  Restrictions/Precautions  Restrictions/Precautions: Fall Risk (high fall risk)  Position Activity Restriction  Other position/activity restrictions: strict I&O, up with assistance, adult diet - regular, low fat/low chol/high fiber/RENATO  Subjective   General  Chart Reviewed: Yes  Additional Pertinent Hx: Admit 2/09 with anemia  from [address], 2 untis PRBC's    cxray - Increased bilateral airspace disease,  CT Chest-Bilateral lower lobe basilar pneumonia and effusions, diverticulosis, cardiology consult  s/p Card cath 2/11 PTCA and stenting of diagonal branch #2 , required platlets/ 1 additional unint of PRBC's                               PMHX: CAD, HTN, HLD, breast cancer, MDS,glaucoma  Family / Caregiver Present: No  Diagnosis: Anemia  Subjective  Subjective: Pt in bed upon entry. \"I just woke up\"      Objective    ADL  Grooming: Independent (to wash hands and \"Pick\" hair, standing at sink)  LE Dressing: Stand by assistance (to don brief)  Toileting: Stand by assistance (pt cpmpleted toileting hygiene while seated on toilet)        Balance  Sitting Balance: Independent  Standing Balance: Supervision  Standing Balance  Time: ~4 min  Activity: bathroom mobility/activity, walk in room  Functional Mobility  Functional - Mobility Device: Standard Walker  Activity: To/from bathroom; Other  Assist Level: Supervision  Toilet Transfers  Toilet - Technique: Ambulating  Equipment Used: Standard toilet (grab bar)  Bed mobility  Supine to Sit: Supervision  Scooting: Independent  Transfers  Stand Step Transfers: Supervision  Sit to stand: Supervision  Stand to sit: Supervision                       Cognition  Overall Cognitive Status: [facility]                                         Plan   Plan  Times per week: 2-5x  Times per day: Daily  Current Treatment Recommendations: Functional Mobility Training,Endurance Training,Safety Education & Training,Self-Care / ADL,Equipment Evaluation, Education, & procurement,Patient/Caregiver Education & Training  AM-PAC Score        AM-PAC Inpatient Daily Activity Raw Score: 21 (02/15/22 0941)  AM-PAC Inpatient ADL T-Scale Score : 44.27 (02/15/22 0941)  ADL Inpatient CMS 0-100% Score: 32.79 (02/15/22 0941)  ADL Inpatient CMS G-Code Modifier : Екатерина Tremayne (02/15/22 0941)    Goals                         No goals met  Short term goals  Time Frame for Short term goals: at d/c  Short term goal 1: Stance x 8 mins with mod indepenence with IADLs/ ADLs  Short term goal 2: LE Dressing with Mod [place]  Short term goal 3: Toileting with Mod independence  Short term goal 4: Chair pushups x 5 reps x 2 sets  Patient Goals   Patient goals : Go home / feel better       Therapy Time   Individual Concurrent Group Co-treatment   Time In 0851         Time Out 0917         Minutes 26         Timed Code Treatment Minutes: 26 Minutes    Total Treatment [address] [facility], OTR/L [address]

## 2022-02-15 NOTE — DISCHARGE INSTR - COC
Continuity of Care Form    Patient Name: Meghna Moon   :  1940  MRN:  [id number]    [facility] date:  2022  Discharge date:  ***    Code Status Order: Full Code   Advance Directives:      Admitting Physician:  Aneta Beatty MD  PCP: Ekaterina Blanco DO    Discharging Nurse: [facility] Unit/Room#: 2119/5187-83  Discharging Unit Phone Number: ***    Emergency Contact:   Extended Emergency Contact Information  Primary Emergency Contact: Bradley Groves  Mobile Phone: [phone]  Relation: Child  Secondary Emergency Contact: Morgan [address] Phone: [phone]  Mobile Phone: [phone]  Relation: Child    Past Surgical History:  Past Surgical History:   Procedure Laterality Date    BREAST SURGERY      lumpectomy right    CATARACT REMOVAL WITH IMPLANT      bilateral    HYSTERECTOMY      age 34    KNEE SURGERY  2012    left side       Immunization History:   Immunization History   Administered Date(s) Administered    COVID-19, Pfizer Purple top, DILUTE for use, 12+ yrs, 30mcg/0.3mL dose 2021, 2021, 2021       Active Problems:  Patient Active Problem List   Diagnosis Code    Left knee DJD M17.12    TIA (transient ischemic attack) G45.9    Transient cerebral ischemia G45.9    Other and unspecified hyperlipidemia E78.5    Essential hypertension I10    Occlusion and stenosis of carotid artery I65.29    HLD (hyperlipidemia) E78.5    AF (atrial fibrillation) (HCC) I48.91    Symptomatic anemia D64.9    Chest pain R07.9       Isolation/Infection:   Isolation            No Isolation          Patient Infection Status       None to display            Nurse Assessment:  Last Vital Signs: BP 97/64   Pulse 117   Temp 98.1 °F (36.7 °C) (Oral)   Resp 26   Ht 5' 1\" (1.549 m)   Wt 134 lb 11.2 oz (61.1 kg)   SpO2 94%   BMI 25.45 kg/m²     Last documented pain score (0-10 scale): Pain Level: 0  Last Weight:   Wt Readings from Last 1 Encounters:   02/15/22 134 lb 11.2 oz (61.1 kg)     Mental Status:  {IP PT MENTAL STATUS:20030}    IV Access:  { SILVIANO IV ACCESS:685138895}    Nursing Mobility/ADLs:  Walking   {[facility] [place] [facility] DME MIEL:296703406}  Transfer  {P DME RWKK:352197581}  Bathing  {P DME CYUM:850922316}  Dressing  {P DME LWIB:500314765}  Toileting  {[facility] [place] [facility] DME FHFV:093678020}  Feeding  {[facility] [place] [facility] DME RKMW:212277171}  Med Admin  {[facility] [place] [facility] DME CHVM:629608439}  Med Delivery   { SILVIANO MED Delivery:806808248}    Wound Care Documentation and Therapy:        Elimination:  Continence: Bowel: {YES / HO:38676}  Bladder: {YES / YE:48337}  Urinary Catheter: {Urinary Catheter:806354917}   Colostomy/Ileostomy/Ileal Conduit: {YES / KF:99739}       Date of Last BM: ***    Intake/Output Summary (Last 24 hours) at 2/15/2022 1344  Last data filed at 2/15/2022 0639  Gross per 24 hour   Intake 210 ml   Output 1200 ml   Net -990 ml     I/O last 3 completed shifts: In: 18 [P.O.:500; I.V.:10]  Out: 1575 [Urine:1575]    Safety Concerns:     [address] SILVIANO Safety Concerns:659720494}    Impairments/Disabilities:      { SILVIANO Impairments/Disabilities:217080921}    Nutrition Therapy:  Current Nutrition Therapy:   [address] SILVIANO Diet List:090033456}    Routes of Feeding: {[facility] DME Other Feedings:167980546}  Liquids: {Slp liquid thickness:15074}  Daily Fluid Restriction: {P DME Yes amt example:081328399}  Last Modified Barium Swallow with Video (Video Swallowing Test): {Done Not Done VHMI:547013971}    Treatments at the Time of Hospital Discharge:   Respiratory Treatments: ***  Oxygen Therapy:  {Therapy; copd oxygen:07186}  Ventilator:    { CC Vent GFOE:869416022}    Heart Failure Instructions for Daily Management  Patient was treated for chronic diastolic heart failure. she  will require the following:    Please weigh daily on the same scale and approximately the same time of day. Report weight gain of 3 pounds/day or 5 pounds/week to :  Bruno  and Moises [place], [place] [phone].   Please use hospital discharge weight as baseline reference. Please monitor for signs and symptoms of and report to MD:  Worsening Heart Failure: sudden weight gain, shortness of breath, lower extremity or general edema/swelling, abdominal bloating/swelling, inability to lie flat, intolerance to usual activity, or cough (especially at night). Report these finding even if no increase in weight. Dehydration:  having difficulty or a decrease in urination, dizziness, worsening fatigue, or new onset/worsening of generalized weakness. Please continue a LOW SODIUM diet and LIMIT fluid intake to 48 - 64 ounces ( 1.5 - 2 liters) per day. Call  [address] and [place], [place] [phone] with any questions or concerns. Please continue heart failure education to patient and family/support system. See After Visit Summary for hospital follow up appointment details. Consider spiritual care referral for support and/or completion of advance directives . Consider: [place] [address] telehealth program if patient agreeable and able to participate and palliative care consult for ongoing goals of care, end of life, and/or chronic disease management discussions. Patient's primary cardiologist is Dr Martha Fox.          Rehab Therapies: {THERAPEUTIC INTERVENTION:5609032563}  Weight Bearing Status/Restrictions: [facility] Weight Bearin}  Other Medical Equipment (for information only, NOT a DME order):  {EQUIPMENT:607782480}  Other Treatments: ***    Patient's personal belongings (please select all that are sent with patient):  {CHP DME Belongings:963041345}    RN SIGNATURE:  {Esignature:615230871}    CASE MANAGEMENT/SOCIAL WORK SECTION    Inpatient Status Date: ***    Readmission Risk Assessment Score:  Readmission Risk              Risk of Unplanned Readmission:  0           Discharging to Facility/ Agency   Name:   Address:  Phone:  Fax:    Dialysis Facility (if applicable) Name:  Address:  Dialysis Schedule:  Phone:  Fax:    / signature: {Esignature:046714323}    PHYSICIAN SECTION    Prognosis: {Prognosis:5231761555}    Condition at Discharge: Fidel Hammond Patient Condition:040236095}    Rehab Potential (if transferring to Rehab): {Prognosis:0503204727}    Recommended Labs or Other Treatments After Discharge: ***    Physician Certification: I certify the above information and transfer of Da Burger  is necessary for the continuing treatment of the diagnosis listed and that she requires {Admit to Appropriate Level of Care:32262} for {GREATER/LESS:372915643} 30 days.      Update Admission H&P: {CHP DME Changes in OPWJW:492877204}    PHYSICIAN SIGNATURE:  {Esignature:708408926}

## 2022-02-15 NOTE — PLAN OF CARE
Problem: Skin Integrity:  Goal: Absence of new skin breakdown  Description: Absence of new skin breakdown  Outcome: Ongoing     Problem: Falls - Risk of:  Goal: Will remain free from falls  Description: Will remain free from falls  2/15/2022 0230 by Chelo Pena RN  Outcome: Ongoing  2/14/2022 1837 by Domingo Parra RN  Outcome: Ongoing

## 2022-02-15 NOTE — CARE COORDINATION
[facility] [address] [phone]. They have accepted this patient and will pull referral from [facility].  They will contact patient and make arrangements for [facility] by 2/17  Electronically signed by aFrhan Cueva LPN on 5/96/1454 at 90:09 AM

## 2022-02-15 NOTE — PLAN OF CARE
Patient discharge home with home health care, discharge instructions reviewed with no questions or concerns.    Problem: Pain:  Goal: Pain level will decrease  Description: Pain level will decrease  2/15/2022 1251 by Teetee Hatch RN  Outcome: Completed  2/15/2022 0230 by Etienne Lundy RN  Outcome: Ongoing  Goal: Control of acute pain  Description: Control of acute pain  2/15/2022 1251 by Teetee Hatch RN  Outcome: Completed  2/15/2022 0230 by Etienne Lundy RN  Outcome: Ongoing  Goal: Control of chronic pain  Description: Control of chronic pain  2/15/2022 1251 by Teetee Hatch RN  Outcome: Completed  2/15/2022 0230 by Etienne Lundy RN  Outcome: Ongoing     Problem: Falls - Risk of:  Goal: Will remain free from falls  Description: Will remain free from falls  2/15/2022 1251 by Teetee Hatch RN  Outcome: Completed  2/15/2022 0230 by Etienne Lundy RN  Outcome: Ongoing  Goal: Absence of physical injury  Description: Absence of physical injury  2/15/2022 1251 by Teetee Hatch RN  Outcome: Completed  2/15/2022 0230 by Etienne Lundy RN  Outcome: Ongoing     Problem: OXYGENATION/RESPIRATORY FUNCTION  Goal: Patient will maintain patent airway  2/15/2022 1251 by Teetee Hatch RN  Outcome: Completed  2/15/2022 0230 by Etienne Lundy RN  Outcome: Ongoing  Goal: Patient will achieve/maintain normal respiratory rate/effort  Description: Respiratory rate and effort will be within normal limits for the patient  2/15/2022 1251 by Teetee Hatch RN  Outcome: Completed  2/15/2022 0230 by Etienne Lundy RN  Outcome: Ongoing     Problem: HEMODYNAMIC STATUS  Goal: Patient has stable vital signs and fluid balance  2/15/2022 1251 by Teetee Hatch RN  Outcome: Completed  2/15/2022 0230 by Etienne Lundy RN  Outcome: Ongoing     Problem: FLUID AND ELECTROLYTE IMBALANCE  Goal: Fluid and electrolyte balance are achieved/maintained  2/15/2022 1251 by Teetee Hatch RN  Outcome: Completed  2/15/2022 0230 by Chelo Pena RN  Outcome: Ongoing     Problem: ACTIVITY INTOLERANCE/IMPAIRED MOBILITY  Goal: Mobility/activity is maintained at optimum level for patient  2/15/2022 1251 by Domingo Parra RN  Outcome: Completed  2/15/2022 0230 by Chelo Pena RN  Outcome: Ongoing     Problem: Bleeding:  Goal: Will show no signs and symptoms of excessive bleeding  Description: Will show no signs and symptoms of excessive bleeding  2/15/2022 1251 by Domingo Parra RN  Outcome: Completed  2/15/2022 0230 by Chelo Pena RN  Outcome: Ongoing     Problem: Skin Integrity:  Goal: Will show no infection signs and symptoms  Description: Will show no infection signs and symptoms  2/15/2022 1251 by Domingo Parra RN  Outcome: Completed  2/15/2022 0230 by Chelo Pena RN  Outcome: Ongoing  Goal: Absence of new skin breakdown  Description: Absence of new skin breakdown  2/15/2022 1251 by Domingo Parra RN  Outcome: Completed  2/15/2022 0230 by Chelo Pena RN  Outcome: Ongoing

## 2022-02-16 ENCOUNTER — CARE COORDINATION (OUTPATIENT)
Dept: CASE MANAGEMENT | Age: 82
End: 2022-02-16

## 2022-02-16 LAB
HCT VFR BLD CALC: NORMAL %
RBC FOLATE: 1249 NG/ML

## 2022-02-16 NOTE — CARE COORDINATION
.in  [address] [facility] Initial Follow Up Call    Call within 2 business days of discharge: Yes    Patient: Catracho Moore Patient : 1940   MRN: [id number]  Reason for Admission: Covid 19 Monitoring  Discharge Date: 2/15/22 RARS: Readmission Risk Score: 18.5 ( )      Last Discharge [facility]       Complaint Diagnosis Description Type Department Provider    22   Admission (Discharged) [address] 4 PCU Leno Cottrell MD    22 GI Problem Iron deficiency anemia due to chronic blood loss . .. ED (TRANSFER) [facility] ED Angela Montelongo MD; Haroldo Monique MD           Spoke with: Catracho Moore who reports that she is good and right now tired and sleepy so she is ready to take a nap. Patient denies cp, sob, cough, dizziness, headache, n/v, diarrhea, abdominal pains, fever, or chills. Patient report that appetite and fluid intake is good and denies any problems with bowel or bladder. Patient is taking all medications as ordered. She declined medication review stating that her daughter takes care of all that and she will also schedule her HFU visits. Patient denies any needs at this time. Patient instructed to continue to monitor s/s, reporting any that may present to MD immediately for early intervention. Reminded of COVID 19 precautions. Agreeable to f/u calls. Writer contacted [facility] spoke with Rei Posadas who states that she or the intake person will give a call back because they are currently in a meeting. Facility:[facility].      Patient contacted regarding COVID-19 risk. Discussed COVID-19 related testing which was available at this time. Test results were negative. Patient informed of results, if available? Yes. LPN Care Coordinator contacted the patient by telephone to perform post discharge assessment. Call within 2 business days of discharge: Yes. Verified name and  with patient as identifiers.  Provided introduction to self, and explanation of the CTN/ACM role, and reason for call due to risk factors for infection and/or exposure to COVID-19. Symptoms reviewed with patient who verbalized the following symptoms: no new symptoms and no worsening symptoms. Due to no new or worsening symptoms encounter was not routed to provider for escalation. Discussed follow-up appointments. If no appointment was previously scheduled, appointment scheduling offered: Yes Patient declined and states that her daughter will take care of it. [facility] follow up appointment(s):   Future Appointments   Date Time Provider Birgit Fitch   3/3/2022  2:45 PM MD Marilynn Evans [facility]     Non-[facility] follow up appointment(s): na    Non-face-to-face services provided:  Obtained and reviewed discharge summary and/or continuity of care documents  Education of patient/family/caregiver/guardian to support self-management-s/s of Covid and when to contact the doctor  Assessment and support for treatment adherence and medication management-. Advance Care Planning:   Does patient have an Advance Directive:  on file. Educated patient about risk for severe COVID-19 due to risk factors according to CDC guidelines. LPN CC reviewed discharge instructions, medical action plan and red flag symptoms with the patient who verbalized understanding. Discussed COVID vaccination status: Yes. Education provided on COVID-19 vaccination as appropriate. Discussed exposure protocols and quarantine with CDC Guidelines. Patient was given an opportunity to verbalize any questions and concerns and agrees to contact LPN CC or health care provider for questions related to their healthcare. Reviewed and educated patient on any new and changed medications related to discharge diagnosis     Was patient discharged with a pulse oximeter? No Discussed and confirmed pulse oximeter discharge instructions and when to notify provider or seek emergency care. LPN CC provided contact information.  Plan for follow-up call in 5-7 days based on severity of symptoms and risk factors.     Care Transitions 24 Hour Call    Do you have any ongoing symptoms?: No  Do you have a copy of your discharge instructions?: Yes  Do you have all of your prescriptions and are they filled?: Yes  Have you been contacted by a [address]?: No  Have you scheduled your follow up appointment?: No  Were you discharged with any Home Care or Post Acute Services: Yes  Post Acute Services: Home Health (Comment: [facility])  Do you feel like you have everything you need to keep you well at home?: Yes  Care Transitions Interventions   Home Care Waiver: Completed            Follow Up  Future Appointments   Date Time Provider Birgit Fitch   3/3/2022  2:45 PM Robert Guzman MD [address] LPN

## 2022-03-03 ENCOUNTER — CARE COORDINATION (OUTPATIENT)
Dept: CASE MANAGEMENT | Age: 82
End: 2022-03-03

## 2022-03-03 ENCOUNTER — HOSPITAL ENCOUNTER (OUTPATIENT)
Dept: NURSING | Age: 82
Setting detail: INFUSION SERIES
Discharge: HOME | End: 2022-03-03
Payer: MEDICARE

## 2022-03-03 VITALS
HEART RATE: 94 BPM | BODY MASS INDEX: 25.3 KG/M2 | HEIGHT: 61 IN | RESPIRATION RATE: 16 BRPM | DIASTOLIC BLOOD PRESSURE: 63 MMHG | SYSTOLIC BLOOD PRESSURE: 125 MMHG | WEIGHT: 134 LBS | TEMPERATURE: 98.4 F

## 2022-03-03 LAB
ABO/RH: NORMAL
ANTIBODY SCREEN: NORMAL
BLOOD BANK DISPENSE STATUS: NORMAL
BLOOD BANK DISPENSE STATUS: NORMAL
BLOOD BANK PRODUCT CODE: NORMAL
BLOOD BANK PRODUCT CODE: NORMAL
BPU ID: NORMAL
BPU ID: NORMAL
DESCRIPTION BLOOD BANK: NORMAL
DESCRIPTION BLOOD BANK: NORMAL

## 2022-03-03 PROCEDURE — P9016 RBC LEUKOCYTES REDUCED: HCPCS

## 2022-03-03 PROCEDURE — 86923 COMPATIBILITY TEST ELECTRIC: CPT

## 2022-03-03 PROCEDURE — 86901 BLOOD TYPING SEROLOGIC RH(D): CPT

## 2022-03-03 PROCEDURE — 36430 TRANSFUSION BLD/BLD COMPNT: CPT

## 2022-03-03 PROCEDURE — 86900 BLOOD TYPING SEROLOGIC ABO: CPT

## 2022-03-03 PROCEDURE — 99211 OFF/OP EST MAY X REQ PHY/QHP: CPT

## 2022-03-03 PROCEDURE — 86850 RBC ANTIBODY SCREEN: CPT

## 2022-03-03 ASSESSMENT — PAIN SCALES - GENERAL: PAINLEVEL_OUTOF10: 0

## 2022-03-03 NOTE — PROGRESS NOTES
Unit of PRBC's has infused without any signs of adverse reactions  NS infusing to clear tubing  Pt was up to BR stand by assist  Gait steady  Pt has a cane as well  Pt cassandra well and reported that she is feeling a lot stronger    Will monitor  Son remains at side

## 2022-03-03 NOTE — PROGRESS NOTES
Blood tubing clear  IV removed  Cath tip intact  Pressure then pressure dressing was applied and then secured with a coban dressing  IV site unremarkable  Pt cassandra well   Discharge instructions were then reviewed with pt and copy was given  Understanding was verbalized  Pt was discharged ambulatory using her cane in stable condition with her son

## 2022-03-03 NOTE — PROGRESS NOTES
Blood is ready  Blood consent has been obtained  No pre meds are ordered  1st unit of  PRBC's started  IV site unremarkable Pt cassandra well  Will stay with pt for 15 mins to monitor for signs of adverse reactions  Son also at side

## 2022-03-03 NOTE — PROGRESS NOTES
No changes noted  No c/o's voiced   1st unit of PRBC's has infused without any signs of adverse reactions  2nd unit PRBC's started  Pt cassandra well  Will stay with pt for 15 mins to monitor for signs of adverse reactions  Son remains at side

## 2022-03-03 NOTE — CARE COORDINATION
Date/Time:  3/3/2022 2:07 PM  Attempted to reach patient by telephone. Call within 2 business days of discharge: Yes,  Left HIPPA compliant message requesting a return call. CTN will close out COVID -19 Monitoring episode at this time.      Thank Genesis Jain RN  Care Transition Coordinator  [facility][phone]

## 2022-03-03 NOTE — PROGRESS NOTES
Pt here ambulatory for 2 units of PRBC's  pt without c/o's at present time but reports that she gets tired very easily  Current Hgb is 7.2 pt is aware of the blood transfusion process and denies any questions or concerns  Pt is not typed and crossmatched  IV # 20 started in LFA on 1st attempt per myself  Pt cassandra well  Blood for type and crossmatch was drawn without difficulty  Pt and son are aware it will take an hour or so for lab to get units of blood ready  Will monitor

## 2022-03-03 NOTE — PROGRESS NOTES
Blood continues to infuse without any signs of adverse reactions  Pt is eating lunch  Son at side  No changes noted  Will continue to [employer]

## 2022-03-03 NOTE — PROGRESS NOTES
Have been at pt's side for 15 mins  Blood infusing well without any signs of adverse reactions  IV site unremarkable  Will continue to monitor

## 2022-03-03 NOTE — PROGRESS NOTES
Have been at pt's side for 15 mins  Blood infusing well without any signs of adverse reactions  IV site unremarkable   Will continue to monitor  Family remains at side

## 2022-03-03 NOTE — PROGRESS NOTES
Yamileth Medal continues to infuse without any signs of adverse reactions   pt engaging in conversation with her son  No c/o's voiced  Will continue to [employer]

## 2022-03-07 ENCOUNTER — HOSPITAL ENCOUNTER (OUTPATIENT)
Dept: CT IMAGING | Age: 82
Discharge: HOME | End: 2022-03-07
Payer: MEDICARE

## 2022-03-07 VITALS
DIASTOLIC BLOOD PRESSURE: 66 MMHG | SYSTOLIC BLOOD PRESSURE: 126 MMHG | RESPIRATION RATE: 16 BRPM | HEART RATE: 101 BPM | OXYGEN SATURATION: 94 % | TEMPERATURE: 98.5 F

## 2022-03-07 DIAGNOSIS — D75.81 MYELOFIBROSIS (HCC): ICD-10-CM

## 2022-03-07 LAB
APTT: 43.3 SEC (ref 26.2–38.6)
BASOPHILS ABSOLUTE: 0 K/UL (ref 0–0.2)
BASOPHILS RELATIVE PERCENT: 0.3 %
EOSINOPHILS ABSOLUTE: 0.3 K/UL (ref 0–0.6)
EOSINOPHILS RELATIVE PERCENT: 3.9 %
HCT VFR BLD CALC: 34.3 % (ref 36–48)
HEMOGLOBIN: 11.4 G/DL (ref 12–16)
INR BLD: 1.28 (ref 0.88–1.12)
LYMPHOCYTES ABSOLUTE: 0.7 K/UL (ref 1–5.1)
LYMPHOCYTES RELATIVE PERCENT: 9.5 %
MCH RBC QN AUTO: 32.7 PG (ref 26–34)
MCHC RBC AUTO-ENTMCNC: 33.3 G/DL (ref 31–36)
MCV RBC AUTO: 98.3 FL (ref 80–100)
MONOCYTES ABSOLUTE: 0.6 K/UL (ref 0–1.3)
MONOCYTES RELATIVE PERCENT: 8.5 %
NEUTROPHILS ABSOLUTE: 5.6 K/UL (ref 1.7–7.7)
NEUTROPHILS RELATIVE PERCENT: 77.8 %
PDW BLD-RTO: 25.6 % (ref 12.4–15.4)
PLATELET # BLD: 295 K/UL (ref 135–450)
PMV BLD AUTO: 8.8 FL (ref 5–10.5)
PROTHROMBIN TIME: 14.6 SEC (ref 9.9–12.7)
RBC # BLD: 3.49 M/UL (ref 4–5.2)
WBC # BLD: 7.2 K/UL (ref 4–11)

## 2022-03-07 PROCEDURE — 85025 COMPLETE CBC W/AUTO DIFF WBC: CPT

## 2022-03-07 PROCEDURE — 85610 PROTHROMBIN TIME: CPT

## 2022-03-07 PROCEDURE — 88305 TISSUE EXAM BY PATHOLOGIST: CPT

## 2022-03-07 PROCEDURE — 85730 THROMBOPLASTIN TIME PARTIAL: CPT

## 2022-03-07 PROCEDURE — C1830 POWER BONE MARROW BX NEEDLE: HCPCS

## 2022-03-07 PROCEDURE — 36415 COLL VENOUS BLD VENIPUNCTURE: CPT

## 2022-03-07 PROCEDURE — 7100000011 HC PHASE II RECOVERY - ADDTL 15 MIN

## 2022-03-07 PROCEDURE — 6360000002 HC RX W HCPCS: Performed by: RADIOLOGY

## 2022-03-07 PROCEDURE — 77012 CT SCAN FOR NEEDLE BIOPSY: CPT

## 2022-03-07 PROCEDURE — 2500000003 HC RX 250 WO HCPCS: Performed by: RADIOLOGY

## 2022-03-07 PROCEDURE — 7100000010 HC PHASE II RECOVERY - FIRST 15 MIN

## 2022-03-07 PROCEDURE — 88311 DECALCIFY TISSUE: CPT

## 2022-03-07 PROCEDURE — 88313 SPECIAL STAINS GROUP 2: CPT

## 2022-03-07 RX ORDER — BUPIVACAINE HYDROCHLORIDE 5 MG/ML
15 INJECTION, SOLUTION EPIDURAL; INTRACAUDAL ONCE
Status: COMPLETED | OUTPATIENT
Start: 2022-03-07 | End: 2022-03-07

## 2022-03-07 RX ORDER — MIDAZOLAM HYDROCHLORIDE 1 MG/ML
1 INJECTION INTRAMUSCULAR; INTRAVENOUS ONCE
Status: COMPLETED | OUTPATIENT
Start: 2022-03-07 | End: 2022-03-07

## 2022-03-07 RX ORDER — HEPARIN SODIUM (PORCINE) LOCK FLUSH IV SOLN 100 UNIT/ML 100 UNIT/ML
500 SOLUTION INTRAVENOUS PRN
Status: DISCONTINUED | OUTPATIENT
Start: 2022-03-07 | End: 2022-03-08 | Stop reason: HOSPADM

## 2022-03-07 RX ORDER — LIDOCAINE HYDROCHLORIDE 10 MG/ML
15 INJECTION, SOLUTION EPIDURAL; INFILTRATION; INTRACAUDAL; PERINEURAL ONCE
Status: COMPLETED | OUTPATIENT
Start: 2022-03-07 | End: 2022-03-07

## 2022-03-07 RX ORDER — FENTANYL CITRATE 50 UG/ML
50 INJECTION, SOLUTION INTRAMUSCULAR; INTRAVENOUS ONCE
Status: COMPLETED | OUTPATIENT
Start: 2022-03-07 | End: 2022-03-07

## 2022-03-07 RX ORDER — ACETAMINOPHEN 325 MG/1
650 TABLET ORAL EVERY 4 HOURS PRN
Status: DISCONTINUED | OUTPATIENT
Start: 2022-03-07 | End: 2022-03-08 | Stop reason: HOSPADM

## 2022-03-07 RX ADMIN — LIDOCAINE HYDROCHLORIDE 15 ML: 10 INJECTION, SOLUTION EPIDURAL; INFILTRATION; INTRACAUDAL; PERINEURAL at 09:35

## 2022-03-07 RX ADMIN — FENTANYL CITRATE 50 MCG: 50 INJECTION, SOLUTION INTRAMUSCULAR; INTRAVENOUS at 09:25

## 2022-03-07 RX ADMIN — MIDAZOLAM HYDROCHLORIDE 1 MG: 2 INJECTION, SOLUTION INTRAMUSCULAR; INTRAVENOUS at 09:25

## 2022-03-07 RX ADMIN — BUPIVACAINE HYDROCHLORIDE 75 MG: 5 INJECTION, SOLUTION EPIDURAL; INTRACAUDAL; PERINEURAL at 09:35

## 2022-03-07 RX ADMIN — Medication 500 UNITS: at 09:38

## 2022-03-07 ASSESSMENT — PAIN SCALES - GENERAL
PAINLEVEL_OUTOF10: 0

## 2022-03-07 NOTE — PROGRESS NOTES
3/7/2022 12:12 PM   Ambulatory Surgery/Procedure Discharge Note    Vitals:    03/07/22 1200   BP: 126/66   Pulse:    Resp:    Temp:    SpO2:      Met criteria for discharge per Demi score. No intake/output data recorded. Restroom use offered before discharge. Yes    Pain assessment:  none  Pain Level: 0    Pt and son states \"ready to go home\". Pt alert and oriented x4. IV removed. Denies N/V or pain. Dressing with scant shadow of drainage. No change  Discharge instructions given to pt and wife with pt permission. Pt and son verbalized understanding of all instructions. Left with all belongings and discharge instructions. Patient discharged to home/self care.  Patient discharged via wheel chair by transporter to waiting family/S.O.       3/7/2022 12:12 PM

## 2022-03-07 NOTE — H&P
IR  H & P      Patient:  Jj Calderon   :   1940      Relevant patient history reviewed and discussed. CC: Myelofibrosis in need of bone marrow biopsy. The procedure including risks and benefits was discussed at length with the patient (or designated family member) and all questions were answered. Informed consent to proceed with the procedure was given. Heart : regular rate and rhythm  Lungs : clear, breathing easily  Airway Assessment: Mallampati 2  Condition : stable    Demi Scale: Activity:  2 - Able to move 4 extremities voluntarily on command  Respiration:  2 - Able to breathe deeply and cough freely  Circulation:  2 - BP+/- 20mmHg of normal  Consciousness:  2 - Fully awake  Oxygen Saturation (color):  2 - Able to maintain oxygen saturation >92% on room air      [facility] and agreed. Medications reviewed.   Current Outpatient Medications on File Prior to Encounter   Medication Sig Dispense Refill    aspirin 81 MG chewable tablet Take 1 tablet by mouth daily 30 tablet 3    atorvastatin (LIPITOR) 80 MG tablet Take 1 tablet by mouth nightly 30 tablet 3    furosemide (LASIX) 40 MG tablet Take 1 tablet by mouth daily 60 tablet 3    clopidogrel (PLAVIX) 75 MG tablet Take 1 tablet by mouth daily 30 tablet 3    pantoprazole (PROTONIX) 40 MG tablet Take 1 tablet by mouth 2 times daily (before meals) 60 tablet 0    lisinopril (PRINIVIL;ZESTRIL) 2.5 MG tablet Take 1 tablet by mouth daily 30 tablet 3    metoprolol succinate (TOPROL XL) 25 MG extended release tablet Take 1 tablet by mouth daily 30 tablet 3    NIFEdipine (PROCARDIA XL) 30 MG extended release tablet Take 1 tablet by mouth daily for 7 doses 7 tablet 0    sertraline (ZOLOFT) 50 MG tablet TAKE ONE TABLET BY MOUTH DAILY      hydroxyurea (HYDREA) 500 MG chemo capsule Take by mouth daily      allopurinol (ZYLOPRIM) 300 MG tablet Take 300 mg by mouth daily      apixaban (ELIQUIS) 5 MG TABS tablet Take by mouth 2 times daily      acetaminophen (TYLENOL) 500 MG tablet Take 500 mg by mouth every 6 hours as needed for Pain      Brinzolamide-Brimonidine (SIMBRINZA) 1-0.2 % SUSP Apply to eye       Bimatoprost (LUMIGAN) 0.01 % SOLN Apply 1 drop to eye nightly Each eye        No current facility-administered medications on file prior to encounter. Allergies:    Allergies   Allergen Reactions    Oxycodone      Sedation : Moderate sedation planned  ASA 1 - Normal health patient

## 2022-03-07 NOTE — SEDATION DOCUMENTATION
IMAGING SERVICES NURSING PROGRESS NOTE    Procedure:  BMBX  March 7, 2022  Meghna Moon      Allergies: Allergies   Allergen Reactions    Oxycodone        There were no vitals filed for this visit. Recent lab work reviewed with MD: yes    Procedure explained to patient by MD: yes   Informed consent obtained:yes  Family with patient:yes    Mental Status:  Normal  Readiness to learn:  Yes  Barriers to learning: No    Pain Assessment Pre-Procedure:  Pain Present:  no  Pain Score:  0  Pain Quality/Description:  Na     Time out Procedure Verification with:  [x] RN  [x] Physician  [x] Patient  [x] Other: CT Technologist  Procedure site marked, if applicable:  Yes    Note: Patient arrived A & O x 4, denies pain, breathing easily on room air, Spoke to Dr. Blade Farrell prior to procedure. Procedural sedation:  Fentanyl:  50mcg  Versed:    1mg  Post Procedureal Note:  Patient tolerated procedure well. Breathing easily on room air. Report given to Gilbert Ely RN. Patient transported in stable conditon to room 4.     Pain Assessment Post-Procedure:  Pain Present:  no  Pain Score:  0  Pain Quality/Description:  na    Plan of Care Goals:  Safety measures met:  Yes  Patient understands explanation of procedure:  Yes    Time in:  [facility]  Time out:  Rahul James RN.  R.N. 3/7/2022

## 2022-03-07 NOTE — BRIEF OP NOTE
Brief Postoperative Note    Margarito Johnson  YOB: 1940  [id number]    Pre-operative Diagnosis: Myelofibrosis in need of bone marrow biopsy.     Post-operative Diagnosis: Same    Procedure: CT guided bone marrow aspiration and core biopsy    Anesthesia: moderate    Surgeons/Assistants: Lauren Hull    Estimated Blood Loss: Minimal    Complications: none    Specimens: were obtained      Lauren Hull MD MD  3/7/2022

## 2022-03-09 ENCOUNTER — HOSPITAL ENCOUNTER (OUTPATIENT)
Age: 82
Discharge: HOME | End: 2022-03-09
Payer: MEDICARE

## 2022-03-09 ENCOUNTER — OFFICE VISIT (OUTPATIENT)
Dept: CARDIOLOGY CLINIC | Age: 82
End: 2022-03-09
Payer: MEDICARE

## 2022-03-09 VITALS
SYSTOLIC BLOOD PRESSURE: 110 MMHG | DIASTOLIC BLOOD PRESSURE: 48 MMHG | OXYGEN SATURATION: 96 % | WEIGHT: 133 LBS | HEART RATE: 104 BPM | HEIGHT: 61 IN | BODY MASS INDEX: 25.11 KG/M2

## 2022-03-09 DIAGNOSIS — I10 ESSENTIAL HYPERTENSION: ICD-10-CM

## 2022-03-09 DIAGNOSIS — I50.9 CONGESTIVE HEART FAILURE, UNSPECIFIED HF CHRONICITY, UNSPECIFIED HEART FAILURE TYPE (HCC): Primary | ICD-10-CM

## 2022-03-09 DIAGNOSIS — I50.9 CONGESTIVE HEART FAILURE, UNSPECIFIED HF CHRONICITY, UNSPECIFIED HEART FAILURE TYPE (HCC): ICD-10-CM

## 2022-03-09 DIAGNOSIS — I48.91 ATRIAL FIBRILLATION, UNSPECIFIED TYPE (HCC): ICD-10-CM

## 2022-03-09 DIAGNOSIS — I25.10 CORONARY ARTERY DISEASE INVOLVING NATIVE CORONARY ARTERY OF NATIVE HEART WITHOUT ANGINA PECTORIS: ICD-10-CM

## 2022-03-09 DIAGNOSIS — E78.5 HYPERLIPIDEMIA, UNSPECIFIED HYPERLIPIDEMIA TYPE: ICD-10-CM

## 2022-03-09 PROCEDURE — 93000 ELECTROCARDIOGRAM COMPLETE: CPT | Performed by: INTERNAL MEDICINE

## 2022-03-09 PROCEDURE — 80048 BASIC METABOLIC PNL TOTAL CA: CPT

## 2022-03-09 PROCEDURE — 83880 ASSAY OF NATRIURETIC PEPTIDE: CPT

## 2022-03-09 PROCEDURE — 99214 OFFICE O/P EST MOD 30 MIN: CPT | Performed by: INTERNAL MEDICINE

## 2022-03-09 PROCEDURE — 36415 COLL VENOUS BLD VENIPUNCTURE: CPT

## 2022-03-09 RX ORDER — METOPROLOL SUCCINATE 25 MG/1
25 TABLET, EXTENDED RELEASE ORAL 2 TIMES DAILY
Qty: 60 TABLET | Refills: 3 | Status: SHIPPED | OUTPATIENT
Start: 2022-03-09 | End: 2022-04-07 | Stop reason: SDUPTHER

## 2022-03-09 NOTE — PROGRESS NOTES
CARDIOLOGY FOLLOW UP        Patient Name: Capri Mandujano  Primary Care physician: Wilfred Bullock DO    Reason for Referral/Chief Complaint: Capri Mandujano is a 78 y.o. patient who is here for follow up for hospital follow up from cardiac cath 2/11/2022: PTCA and stenting of diagonal branch #2     History of Present Illness:   Capri Mandujano is a 78 y.o. female with a past medical history of transient ischemic attack, hyperlipidemia, hypertension, new atrial fibrillation, Gastric Ulcer, GIB, CHF, breast cancer with prior surgery and radiation of the chest, and mild carotid stenosis. She was hospitalized at the [address] [place] [address] 6/2021-7/2021 (no records available at this time, requested) and underwent a bone marrow biopsy that was suggestive of myleproliferative neoplasm. Diagnosed with atrial fibrillation at that time. There is question of whether she was diagnosed with infective endocarditis. In interim, she presented to [facility] ED 2/8/2022 with complaints of chest pain. Transferred to [facility]. Treated for worsening CHF and GIB. She received blood transfusions for hemoglobin of 5.5. She underwent cardiac cath 2/11/2022 with successful PTCA and stenting of diagonal branch #2 with 99% stenosis reduced to 0 and ostial stenosis with from 90 to 20% residual with Dr. Falguni Steiner. Mild LV dysfunction by echo. Today, she reports that she felt like she was having chest spasm that hurt prior going to ED. Though possibly GI related/GERD. She was nauseated but denies diaphoresis. She notes that she had been having chest pain at night for a while prior. She reports feeling better today than she has in a long time. Doing well since PCI. No recurrence of chest pain. No edema, PND, orthopnea. No limiting dyspnea. She was transfused in October and yesterday. Not frequent. She denies active bleeding. H/H stable by [employer] today. She reports she lives alone and is able to complete ADLs on her own. Denies palpitations, near-syncope or poonam syncope. The patient is compliant with medications. Cost of medications is affordable. No endorsed side effects. Has been maintained on triple therapy. She denies any evidence of hematemesis, hemoptysis, melena, hematochezia or hematuria with blood thinner. Past Medical History:   has a past medical history of Allergic rhinitis, CAD S/P percutaneous coronary angioplasty, Cancer ([facility] [address] GI bleed, Glaucoma, Hyperlipidemia, Hypertension, Myelodysplastic syndrome ([facility] [address] Radiation therapy complication, and UTI (lower urinary tract infection). Surgical History:   has a past surgical history that includes Cataract removal with implant; Breast surgery (1999); Hysterectomy; knee surgery (2/6/2012); and CT BIOPSY BONE MARROW (3/7/2022). Social History:   reports that she quit smoking about 25 years ago. She has never used smokeless tobacco. She reports that she does not drink alcohol and does not use drugs. Family History:  Reports no history of early onset coronary artery disease, myocardial infarction, cerebrovascular accident or sudden cardiac death among primary relatives. Home Medications:  Were reviewed and are listed in nursing record and/or below  Prior to Admission medications    Medication Sig Start Date End Date Taking?  Authorizing Provider   aspirin 81 MG chewable tablet Take 1 tablet by mouth daily 2/15/22  Yes Leno Cottrell MD   atorvastatin (LIPITOR) 80 MG tablet Take 1 tablet by mouth nightly 2/15/22  Yes Leno Cottrell MD   furosemide (LASIX) 40 MG tablet Take 1 tablet by mouth daily 2/15/22  Yes Leno Cottrell MD   clopidogrel (PLAVIX) 75 MG tablet Take 1 tablet by mouth daily 2/15/22  Yes Leno Cottrell MD   pantoprazole (PROTONIX) 40 MG tablet Take 1 tablet by mouth 2 times daily (before meals) 2/15/22  Yes Leno Cottrell MD   lisinopril (PRINIVIL;ZESTRIL) 2.5 MG tablet Take 1 tablet by mouth daily 2/15/22  Yes Brennen Noble MD   metoprolol succinate (TOPROL XL) 25 MG extended release tablet Take 1 tablet by mouth daily 2/15/22  Yes Brennen Noble MD   sertraline (ZOLOFT) 50 MG tablet TAKE ONE TABLET BY MOUTH DAILY 12/2/21  Yes Historical Provider, MD   allopurinol (ZYLOPRIM) 300 MG tablet Take 300 mg by mouth daily   Yes Historical Provider, MD   apixaban (ELIQUIS) 5 MG TABS tablet Take by mouth 2 times daily   Yes Historical Provider, MD   acetaminophen (TYLENOL) 500 MG tablet Take 500 mg by mouth every 6 hours as needed for Pain   Yes Historical Provider, MD   Brinzolamide-Brimonidine ([place]) 1-0.2 % SUSP Apply to eye    Yes Historical Provider, MD   Bimatoprost (LUMIGAN) 0.01 % SOLN Apply 1 drop to eye nightly Each eye    Yes Historical Provider, MD   NIFEdipine (PROCARDIA XL) 30 MG extended release tablet Take 1 tablet by mouth daily for 7 doses 2/3/22 2/10/22  Nisha Soria MD   hydroxyurea (HYDREA) 500 MG chemo capsule Take by mouth daily  Patient not taking: Reported on 3/9/2022    Historical Provider, MD        CURRENT Medications:  No current facility-administered medications for this visit. Allergies:  Oxycodone     Review of Systems:   A 14 point review of symptoms completed. Pertinent positives identified in the HPI, all other review of symptoms negative as below. Objective:     Vitals:    03/09/22 1428   BP: (!) 110/48   Pulse: 104   SpO2: 96%   Weight: 133 lb (60.3 kg)   Height: 5' 1\" (1.549 m)      Weight: 133 lb (60.3 kg)       PHYSICAL EXAM:    General:  Alert, cooperative, no distress, appears stated age   Head:  Normocephalic, atraumatic   Eyes:  Conjunctiva/corneas clear, anicteric sclerae    Nose: Nares normal, no drainage or sinus tenderness   Throat: No abnormalities of the lips, oral mucosa or tongue. Neck: Trachea midline.  Neck supple with no lymphadenopathy, thyroid not enlarged, symmetric, no tenderness/mass/nodules, no Jugular venous pressure elevation    Lungs:   Clear to auscultation bilaterally, no wheezes, no rales, no respiratory distress   Chest Wall:  No deformity or tenderness to palpation   Heart:  Regular rate and rhythm, normal S1, normal S2, 2/6 systolic ejection murmur at the right sternal border with radiation to the carotid, early peaking with preserved A2, no rub, no S3/S4, PMI non-displaced. Abdomen:   Soft, non-tender, with normoactive bowel sounds. No masses, no hepatosplenomegaly   Extremities: No cyanosis, clubbing or significant pitting edema. Vascular: 2+ radial, 2+ dorsalis pedis and posterior tibial pulses bilaterally. Brisk carotid upstrokes without carotid bruit. Skin: Skin color, texture, turgor are normal with no rashes or ulceration. Pysch: Euthymic mood, appropriate affect   Neurologic: Oriented to person, place and time. No slurred speech or facial asymmetry. No motor or sensory deficits on gross examination.          Labs:   CBC:   Lab Results   Component Value Date    WBC 7.2 03/07/2022    RBC 3.49 03/07/2022    HGB 11.4 03/07/2022    HCT 34.3 03/07/2022    MCV 98.3 03/07/2022    RDW 25.6 03/07/2022     03/07/2022       CMP:  Lab Results   Component Value Date     02/15/2022    K 4.0 02/15/2022    CL 92 02/15/2022    CO2 31 02/15/2022    BUN 16 02/15/2022    CREATININE 0.5 02/15/2022    GFRAA >60 02/15/2022    GFRAA 136 03/12/2012    AGRATIO 2.0 02/09/2022    LABGLOM >60 02/15/2022    GLUCOSE 144 02/15/2022    PROT 6.2 02/09/2022    CALCIUM 8.9 02/15/2022    BILITOT 0.8 02/09/2022    ALKPHOS 97 02/09/2022    AST 6 02/09/2022    ALT 6 02/09/2022     PT/INR:  No results found for: PTINR  HgBA1c:  Lab Results   Component Value Date    LABA1C 5.3 04/17/2021     Lab Results   Component Value Date    TROPONINI 0.96 (HH) 02/11/2022     Lab Results   Component Value Date    CHOL 92 02/11/2022    CHOL 110 02/09/2022    CHOL 147 01/23/2014     Lab Results   Component Value Date    TRIG 131 02/11/2022    TRIG 96 02/09/2022    TRIG 246 (H) 01/23/2014     Lab Results   Component Value Date    HDL 34 (L) 02/11/2022    HDL 38 (L) 02/09/2022    HDL 27 (L) 04/17/2021     Lab Results   Component Value Date    LDLCALC 32 02/11/2022    LDLCALC 53 02/09/2022    LDLCALC 50 04/17/2021     Lab Results   Component Value Date    LABVLDL 26 02/11/2022    LABVLDL 19 02/09/2022    LABVLDL 41 04/17/2021     No results found for: CHOLHDLRATIO      Cardiac Data:     EKGs reviewed with my interpretation: Sinus tachycardia 102 bpm, non-specific T wave abnormality. Echo 2/9/2022:   Summary   Left ventricular cavity size is normal. There is mild left ventricular   hypertrophy. Overall left ventricular systolic function appears mildly   reduced with an ejection fraction of 45-50%. There is hypokinesis of the apex   and distal septum /anteroseptum. Diastolic filling parameters suggest grade   II diastolic dysfunction. There is increased LVEDP and LAP. The mitral valve leaflets appear normal in structure. Mitral annular   calcification is present. Mild to moderate mitral regurgitation is present. No evidence of mitral valve stenosis. There is a linear mobile echodensity   off the anterior mitral leaflet (ventricular aspect), likely a torn cord. The left atrium is moderately dilated. Mild to moderate tricuspid regurgitation. Echo 9/30/2021:  Summary  Left ventricular systolic function is normal with ejection fraction  estimated at 55-60 %. No regional wall motion abnormalities are noted. There is mild concentric left ventricular hypertrophy. Normal left ventricular diastolic filling pressure. Mild anterior and posterior mitral annular calcification is present. Moderate mitral regurgitation. The appears to be a small calcified echogenic mass near LVOT, likely  represents degenerative valve disease, and is less likely a vegetation. Study was compared to 2014 echo and the LVOT mass was not present in 2014. Mild aortic regurgitation is present.   Moderate tricuspid regurgitation. Normal systolic pulmonary artery pressure (SPAP) estimated at 32 mmHg (RA  pressure 3 mmHg). Mild pulmonic regurgitation present. 1/23/2014 55% EF, mild MR.    CARDIAC CATH 2/11/2022  Left main normal.  LAD mid vessel 70% diffuse disease after second diagonal branch. MABEL grade III flow present. Second diagonal branch ostial 99% stenosis mid vessel 99% stenosis diffuse disease in between. Right coronary artery collateralized distal right coronary occluded  Circumflex and obtuse marginal branches with diffuse moderate disease. LVEDP 20 mmHg LVEF estimated at 40% with diffuse hypokinesia more pronounced in the anterior apex. Conclusion: Successful PTCA and stenting of diagonal branch #2 with 99% stenosis reduced to 0 and ostial stenosis with from 90 to 20% residual.  Plan:  hydration Lasix bedrest for tonight. Resume activity 8 AM on February 12, 2022. Watch platelets and hemoglobin in this patient with GI bleeding and myelodysplastic syndrome. Hold Eliquis and continue Plavix and aspirin 81 mg daily      CTA of Chest/Abd 2/9/2022  HEART AND GREAT VESSELS: Mildly large in size  Ascending aortic diameter is normal measures 36 mm Descending thoracic aortic diameter is normal measures 19 mm No aneurysm or dissection. Tortuosity of the innominate arteries. Cervicocerebral arch vessels are normal     1. No evidence of abdominal aortic aneurysm, dissection or intramural hematoma 2. Normal thoracic and abdominal aortic diameter 3. Bilateral lower lobe basilar pneumonia and effusions 4. Colonic diverticulosis 5. Porcelain gallbladder     Echo 1/2014:   Summary   Normal left ventricle size, wall thickness and systolic function with an   estimated ejection fraction of 55%. No regional wall motion abnormalities   are seen. Grade I diastolic dysfunction is present. Mitral valve is structurally normal.   Mild mitral regurgitation is present.    The left atrial size is normal.   The aortic valve is normal in structure and function. No evidence of aortic valve regurgitation. No evidence of aortic stenosis. Carotid doppler bilateral 9/2014:   No hemodynamically significant lesion. No great change from the comparison study \"No significant plaque burden bilaterally\". Impression and Plan:     1. NSTEMI status post PCI diagonal 2/2022  2. Cardiomyopathy, ischemic, mild LV dysfunction  3. Paroxysmal atrial fibrillation, maintaining sinus exam/ECG today  4. Reports possible history of endocarditis, unknown valve, unknown organism. Likely mis-interpreted torn cord as seen in serial echo follow up. 3.  Mild-moderate MR/TR  4. Hypertension - controlled  5. Hyperlipidemia - statin   6. Breast cancer status post surgery and radiation  7. History of transient ischemic attack  8. Carotid artery disease    [id number] Score for Atrial Fibrillation Stroke Risk   Risk   Factors  Component Value   C CHF Yes 1   H HTN Yes 1   A2 Age >= 76 Yes,  (78 y.o.) 2   D DM No 0   S2 Prior Stroke/TIA Yes 2   V Vascular Disease No 0   A Age 74-69 No,  (78 y.o.) 0   Sc Sex female 1    [id number]  Score  7   Score last updated 9/17/21 6:16 PM EDT    Click here for a link to the UpToDate guideline \"Atrial Fibrillation: Anticoagulation therapy to prevent embolization    Disclaimer: Risk Score calculation is dependent on accuracy of patient problem list and past encounter diagnosis. Patient Active Problem List   Diagnosis    Left knee DJD    TIA (transient ischemic attack)    Transient cerebral ischemia    Other and unspecified hyperlipidemia    Essential hypertension    Occlusion and stenosis of carotid artery    HLD (hyperlipidemia)    AF (atrial fibrillation) (HCC)    Symptomatic anemia    Chest pain    Coronary artery disease involving native coronary artery of native heart without angina pectoris       PLAN:   1. Medications reviewed. STOP aspirin now status post 1 month triple therapy. Continue Eliquis and Plavix moving forward. 2. Continue high intensity statin, BB  3. Metoprolol XL and low dose lisinopril for GDMT. May DC nifedipine if needed for lower BP's/dizziness. 4. Weigh daily. If you weight goes up 3# from baseline at home today, then take a lasix once. If weight daily < this threshold, she will withhold. 5. Labs for BMP and BNP    Plan to follow up in 3 months for re-evaluation. This note was scribed in the presence of Nora Felton MD by Sharon Noble RN. The scribes documentation has been prepared under my direction and personally reviewed by me in its entirety. I confirm that the note above accurately reflects all work, treatment, procedures, and medical decision making performed by me. Danish Orona MD, personally performed the services described in this documentation as scribed by Sharon Noble RN in my presence, and it is both accurate and complete to the best of our ability. I will address the patient's cardiac risk factors and adjusted pharmacologic treatment as needed. In addition, I have reinforced the need for patient directed risk factor modification. All questions and concerns were addressed to the patient/family. Alternatives to my treatment were discussed. Thank you for allowing us to participate in the care of Rima Blancas. Please call me with any questions [phone].     Nora Felton MD, [facility]  Cardiovascular Disease  [facility], [place], operated by [facility]  [phone] [facility]  [phone] [address]  3/9/2022 2:59 PM

## 2022-03-09 NOTE — PATIENT INSTRUCTIONS
PLAN:   1. Medications reviewed. 2. Okay to stop aspirin. Continue Eliquis and Plavix  3. Weigh daily. If you weight goes up 3# then take a lasix once that day. 4. Labs for BMP and BNP    Plan to follow up in 3 months for re-evaluation.

## 2022-03-10 LAB
ANION GAP SERPL CALCULATED.3IONS-SCNC: 18 MMOL/L (ref 3–16)
BUN BLDV-MCNC: 14 MG/DL (ref 7–20)
CALCIUM SERPL-MCNC: 8.6 MG/DL (ref 8.3–10.6)
CHLORIDE BLD-SCNC: 98 MMOL/L (ref 99–110)
CO2: 26 MMOL/L (ref 21–32)
CREAT SERPL-MCNC: 0.7 MG/DL (ref 0.6–1.2)
GFR AFRICAN AMERICAN: >60
GFR NON-AFRICAN AMERICAN: >60
GLUCOSE BLD-MCNC: 124 MG/DL (ref 70–99)
POTASSIUM SERPL-SCNC: 3.5 MMOL/L (ref 3.5–5.1)
PRO-BNP: 7086 PG/ML (ref 0–449)
SODIUM BLD-SCNC: 142 MMOL/L (ref 136–145)

## 2022-03-11 ENCOUNTER — TELEPHONE (OUTPATIENT)
Dept: CARDIOLOGY CLINIC | Age: 82
End: 2022-03-11

## 2022-03-11 NOTE — TELEPHONE ENCOUNTER
----- Message from Keith Campo MD sent at 3/10/2022 12:31 PM EST -----  Pro-BNP is elevated but much dec from 3 weeks ago - stick to lasix plan as outlined in office. Ensure she knows her \"dry weight\" based on home weight yesterday. Ensure compliance. Electrolytes and renal function stable. Stop nifedipine. Inc lisinopril to 5 mg once daily tomorrow.

## 2022-03-16 ENCOUNTER — HOSPITAL ENCOUNTER (INPATIENT)
Age: 82
LOS: 3 days | Discharge: HOME HEALTH | DRG: 291 | End: 2022-03-19
Attending: INTERNAL MEDICINE | Admitting: INTERNAL MEDICINE
Payer: MEDICARE

## 2022-03-16 ENCOUNTER — TELEPHONE (OUTPATIENT)
Dept: OTHER | Facility: CLINIC | Age: 82
End: 2022-03-16

## 2022-03-16 ENCOUNTER — HOSPITAL ENCOUNTER (EMERGENCY)
Age: 82
Discharge: ANOTHER HOSPITAL | End: 2022-03-16
Attending: EMERGENCY MEDICINE
Payer: MEDICARE

## 2022-03-16 ENCOUNTER — APPOINTMENT (OUTPATIENT)
Dept: GENERAL RADIOLOGY | Age: 82
End: 2022-03-16
Payer: MEDICARE

## 2022-03-16 VITALS
DIASTOLIC BLOOD PRESSURE: 59 MMHG | TEMPERATURE: 98.3 F | RESPIRATION RATE: 21 BRPM | WEIGHT: 130 LBS | HEIGHT: 61 IN | HEART RATE: 89 BPM | OXYGEN SATURATION: 94 % | BODY MASS INDEX: 24.55 KG/M2 | SYSTOLIC BLOOD PRESSURE: 111 MMHG

## 2022-03-16 DIAGNOSIS — D72.829 LEUKOCYTOSIS, UNSPECIFIED TYPE: ICD-10-CM

## 2022-03-16 DIAGNOSIS — J96.01 ACUTE RESPIRATORY FAILURE WITH HYPOXIA (HCC): Primary | ICD-10-CM

## 2022-03-16 DIAGNOSIS — I50.43 ACUTE ON CHRONIC COMBINED SYSTOLIC AND DIASTOLIC CONGESTIVE HEART FAILURE (HCC): ICD-10-CM

## 2022-03-16 PROBLEM — J96.00 ACUTE RESPIRATORY FAILURE (HCC): Status: ACTIVE | Noted: 2022-03-16

## 2022-03-16 LAB
A/G RATIO: 1.6 (ref 1.1–2.2)
ALBUMIN SERPL-MCNC: 4.1 G/DL (ref 3.4–5)
ALP BLD-CCNC: 198 U/L (ref 40–129)
ALT SERPL-CCNC: 19 U/L (ref 10–40)
ANION GAP SERPL CALCULATED.3IONS-SCNC: 15 MMOL/L (ref 3–16)
ANISOCYTOSIS: ABNORMAL
AST SERPL-CCNC: 12 U/L (ref 15–37)
ATYPICAL LYMPHOCYTE RELATIVE PERCENT: 3 % (ref 0–6)
BANDED NEUTROPHILS RELATIVE PERCENT: 23 % (ref 0–7)
BASE EXCESS VENOUS: -0.2 MMOL/L (ref -3–3)
BASOPHILS ABSOLUTE: 0 K/UL (ref 0–0.2)
BASOPHILS ABSOLUTE: 0 K/UL (ref 0–0.2)
BASOPHILS RELATIVE PERCENT: 0 %
BASOPHILS RELATIVE PERCENT: 0 %
BILIRUB SERPL-MCNC: 1.1 MG/DL (ref 0–1)
BUN BLDV-MCNC: 12 MG/DL (ref 7–20)
CALCIUM SERPL-MCNC: 9 MG/DL (ref 8.3–10.6)
CARBOXYHEMOGLOBIN: 1.9 % (ref 0–1.5)
CHLORIDE BLD-SCNC: 100 MMOL/L (ref 99–110)
CO2: 25 MMOL/L (ref 21–32)
CREAT SERPL-MCNC: 0.6 MG/DL (ref 0.6–1.2)
EKG ATRIAL RATE: 118 BPM
EKG DIAGNOSIS: NORMAL
EKG P AXIS: 59 DEGREES
EKG P-R INTERVAL: 146 MS
EKG Q-T INTERVAL: 312 MS
EKG QRS DURATION: 78 MS
EKG QTC CALCULATION (BAZETT): 437 MS
EKG R AXIS: 64 DEGREES
EKG T AXIS: 16 DEGREES
EKG VENTRICULAR RATE: 118 BPM
EOSINOPHILS ABSOLUTE: 0 K/UL (ref 0–0.6)
EOSINOPHILS ABSOLUTE: 0 K/UL (ref 0–0.6)
EOSINOPHILS RELATIVE PERCENT: 0 %
EOSINOPHILS RELATIVE PERCENT: 0 %
GFR AFRICAN AMERICAN: >60
GFR NON-AFRICAN AMERICAN: >60
GLUCOSE BLD-MCNC: 170 MG/DL (ref 70–99)
HCO3 VENOUS: 26.6 MMOL/L (ref 23–29)
HCT VFR BLD CALC: 33.3 % (ref 36–48)
HCT VFR BLD CALC: 38.1 % (ref 36–48)
HEMOGLOBIN: 10.5 G/DL (ref 12–16)
HEMOGLOBIN: 12 G/DL (ref 12–16)
LACTIC ACID, SEPSIS: 3.9 MMOL/L (ref 0.4–1.9)
LACTIC ACID, SEPSIS: 4.3 MMOL/L (ref 0.4–1.9)
LACTIC ACID: 4.5 MMOL/L (ref 0.4–2)
LYMPHOCYTES ABSOLUTE: 1.4 K/UL (ref 1–5.1)
LYMPHOCYTES ABSOLUTE: 1.5 K/UL (ref 1–5.1)
LYMPHOCYTES RELATIVE PERCENT: 4 %
LYMPHOCYTES RELATIVE PERCENT: 5 %
Lab: NORMAL
MACROCYTES: ABNORMAL
MCH RBC QN AUTO: 31.2 PG (ref 26–34)
MCH RBC QN AUTO: 31.9 PG (ref 26–34)
MCHC RBC AUTO-ENTMCNC: 31.5 G/DL (ref 31–36)
MCHC RBC AUTO-ENTMCNC: 31.6 G/DL (ref 31–36)
MCV RBC AUTO: 101.3 FL (ref 80–100)
MCV RBC AUTO: 98.9 FL (ref 80–100)
METHEMOGLOBIN VENOUS: 0.3 %
MICROCYTES: ABNORMAL
MONOCYTES ABSOLUTE: 1 K/UL (ref 0–1.3)
MONOCYTES ABSOLUTE: 2.2 K/UL (ref 0–1.3)
MONOCYTES RELATIVE PERCENT: 5 %
MONOCYTES RELATIVE PERCENT: 8 %
NEUTROPHILS ABSOLUTE: 18.4 K/UL (ref 1.7–7.7)
NEUTROPHILS ABSOLUTE: 23.6 K/UL (ref 1.7–7.7)
NEUTROPHILS RELATIVE PERCENT: 65 %
NEUTROPHILS RELATIVE PERCENT: 87 %
O2 SAT, VEN: 84 %
O2 THERAPY: ABNORMAL
OVALOCYTES: ABNORMAL
PCO2, VEN: 52.7 MMHG (ref 40–50)
PDW BLD-RTO: 27.3 % (ref 12.4–15.4)
PDW BLD-RTO: 27.6 % (ref 12.4–15.4)
PH VENOUS: 7.32 (ref 7.35–7.45)
PLATELET # BLD: 511 K/UL (ref 135–450)
PLATELET # BLD: 621 K/UL (ref 135–450)
PLATELET SLIDE REVIEW: ABNORMAL
PLATELET SLIDE REVIEW: ADEQUATE
PMV BLD AUTO: 8.1 FL (ref 5–10.5)
PMV BLD AUTO: 8.5 FL (ref 5–10.5)
PO2, VEN: 52.1 MMHG (ref 25–40)
POIKILOCYTES: ABNORMAL
POLYCHROMASIA: ABNORMAL
POTASSIUM REFLEX MAGNESIUM: 4.6 MMOL/L (ref 3.5–5.1)
PRO-BNP: ABNORMAL PG/ML (ref 0–449)
PROCALCITONIN: 0.21 NG/ML (ref 0–0.15)
RBC # BLD: 3.28 M/UL (ref 4–5.2)
RBC # BLD: 3.86 M/UL (ref 4–5.2)
REPORT: NORMAL
SARS-COV-2, NAAT: NOT DETECTED
SCHISTOCYTES: ABNORMAL
SLIDE REVIEW: ABNORMAL
SLIDE REVIEW: ABNORMAL
SODIUM BLD-SCNC: 140 MMOL/L (ref 136–145)
SPHEROCYTES: ABNORMAL
TCO2 CALC VENOUS: 28 MMOL/L
THIS TEST SENT TO: NORMAL
TOTAL PROTEIN: 6.6 G/DL (ref 6.4–8.2)
TROPONIN: 0.02 NG/ML
TROPONIN: <0.01 NG/ML
TROPONIN: <0.01 NG/ML
WBC # BLD: 20.9 K/UL (ref 4–11)
WBC # BLD: 27.1 K/UL (ref 4–11)

## 2022-03-16 PROCEDURE — 36415 COLL VENOUS BLD VENIPUNCTURE: CPT

## 2022-03-16 PROCEDURE — 83605 ASSAY OF LACTIC ACID: CPT

## 2022-03-16 PROCEDURE — 82803 BLOOD GASES ANY COMBINATION: CPT

## 2022-03-16 PROCEDURE — 96374 THER/PROPH/DIAG INJ IV PUSH: CPT

## 2022-03-16 PROCEDURE — 94761 N-INVAS EAR/PLS OXIMETRY MLT: CPT

## 2022-03-16 PROCEDURE — 87635 SARS-COV-2 COVID-19 AMP PRB: CPT

## 2022-03-16 PROCEDURE — 2700000000 HC OXYGEN THERAPY PER DAY

## 2022-03-16 PROCEDURE — 99223 1ST HOSP IP/OBS HIGH 75: CPT | Performed by: INTERNAL MEDICINE

## 2022-03-16 PROCEDURE — 93010 ELECTROCARDIOGRAM REPORT: CPT | Performed by: INTERNAL MEDICINE

## 2022-03-16 PROCEDURE — 83880 ASSAY OF NATRIURETIC PEPTIDE: CPT

## 2022-03-16 PROCEDURE — 87040 BLOOD CULTURE FOR BACTERIA: CPT

## 2022-03-16 PROCEDURE — 80053 COMPREHEN METABOLIC PANEL: CPT

## 2022-03-16 PROCEDURE — 93005 ELECTROCARDIOGRAM TRACING: CPT | Performed by: EMERGENCY MEDICINE

## 2022-03-16 PROCEDURE — 6370000000 HC RX 637 (ALT 250 FOR IP): Performed by: EMERGENCY MEDICINE

## 2022-03-16 PROCEDURE — 6370000000 HC RX 637 (ALT 250 FOR IP): Performed by: INTERNAL MEDICINE

## 2022-03-16 PROCEDURE — 6360000002 HC RX W HCPCS

## 2022-03-16 PROCEDURE — 99285 EMERGENCY DEPT VISIT HI MDM: CPT

## 2022-03-16 PROCEDURE — 84484 ASSAY OF TROPONIN QUANT: CPT

## 2022-03-16 PROCEDURE — 2060000000 HC ICU INTERMEDIATE R&B

## 2022-03-16 PROCEDURE — 2580000003 HC RX 258: Performed by: INTERNAL MEDICINE

## 2022-03-16 PROCEDURE — 85025 COMPLETE CBC W/AUTO DIFF WBC: CPT

## 2022-03-16 PROCEDURE — 6360000002 HC RX W HCPCS: Performed by: EMERGENCY MEDICINE

## 2022-03-16 PROCEDURE — 6360000002 HC RX W HCPCS: Performed by: INTERNAL MEDICINE

## 2022-03-16 PROCEDURE — 6370000000 HC RX 637 (ALT 250 FOR IP)

## 2022-03-16 PROCEDURE — 71045 X-RAY EXAM CHEST 1 VIEW: CPT

## 2022-03-16 PROCEDURE — 84145 PROCALCITONIN (PCT): CPT

## 2022-03-16 PROCEDURE — 2580000003 HC RX 258: Performed by: NURSE PRACTITIONER

## 2022-03-16 RX ORDER — IPRATROPIUM BROMIDE AND ALBUTEROL SULFATE 2.5; .5 MG/3ML; MG/3ML
1 SOLUTION RESPIRATORY (INHALATION) ONCE
Status: COMPLETED | OUTPATIENT
Start: 2022-03-16 | End: 2022-03-16

## 2022-03-16 RX ORDER — ALLOPURINOL 300 MG/1
300 TABLET ORAL DAILY
Status: DISCONTINUED | OUTPATIENT
Start: 2022-03-16 | End: 2022-03-19 | Stop reason: HOSPADM

## 2022-03-16 RX ORDER — ONDANSETRON 2 MG/ML
4 INJECTION INTRAMUSCULAR; INTRAVENOUS EVERY 6 HOURS PRN
Status: DISCONTINUED | OUTPATIENT
Start: 2022-03-16 | End: 2022-03-19 | Stop reason: HOSPADM

## 2022-03-16 RX ORDER — 0.9 % SODIUM CHLORIDE 0.9 %
250 INTRAVENOUS SOLUTION INTRAVENOUS ONCE
Status: COMPLETED | OUTPATIENT
Start: 2022-03-17 | End: 2022-03-17

## 2022-03-16 RX ORDER — FUROSEMIDE 10 MG/ML
60 INJECTION INTRAMUSCULAR; INTRAVENOUS ONCE
Status: DISCONTINUED | OUTPATIENT
Start: 2022-03-16 | End: 2022-03-16

## 2022-03-16 RX ORDER — SODIUM CHLORIDE 9 MG/ML
25 INJECTION, SOLUTION INTRAVENOUS PRN
Status: DISCONTINUED | OUTPATIENT
Start: 2022-03-16 | End: 2022-03-19 | Stop reason: HOSPADM

## 2022-03-16 RX ORDER — METOPROLOL SUCCINATE 25 MG/1
25 TABLET, EXTENDED RELEASE ORAL 2 TIMES DAILY
Status: DISCONTINUED | OUTPATIENT
Start: 2022-03-16 | End: 2022-03-19 | Stop reason: HOSPADM

## 2022-03-16 RX ORDER — ACETAMINOPHEN 650 MG/1
650 SUPPOSITORY RECTAL EVERY 6 HOURS PRN
Status: DISCONTINUED | OUTPATIENT
Start: 2022-03-16 | End: 2022-03-19 | Stop reason: HOSPADM

## 2022-03-16 RX ORDER — LISINOPRIL 10 MG/1
10 TABLET ORAL DAILY
Status: DISCONTINUED | OUTPATIENT
Start: 2022-03-17 | End: 2022-03-18

## 2022-03-16 RX ORDER — FUROSEMIDE 10 MG/ML
20 INJECTION INTRAMUSCULAR; INTRAVENOUS ONCE
Status: COMPLETED | OUTPATIENT
Start: 2022-03-16 | End: 2022-03-16

## 2022-03-16 RX ORDER — FUROSEMIDE 10 MG/ML
40 INJECTION INTRAMUSCULAR; INTRAVENOUS ONCE
Status: COMPLETED | OUTPATIENT
Start: 2022-03-16 | End: 2022-03-16

## 2022-03-16 RX ORDER — POLYETHYLENE GLYCOL 3350 17 G/17G
17 POWDER, FOR SOLUTION ORAL DAILY PRN
Status: DISCONTINUED | OUTPATIENT
Start: 2022-03-16 | End: 2022-03-19 | Stop reason: HOSPADM

## 2022-03-16 RX ORDER — SODIUM CHLORIDE 0.9 % (FLUSH) 0.9 %
5-40 SYRINGE (ML) INJECTION EVERY 12 HOURS SCHEDULED
Status: DISCONTINUED | OUTPATIENT
Start: 2022-03-16 | End: 2022-03-19 | Stop reason: HOSPADM

## 2022-03-16 RX ORDER — SODIUM CHLORIDE 0.9 % (FLUSH) 0.9 %
5-40 SYRINGE (ML) INJECTION PRN
Status: DISCONTINUED | OUTPATIENT
Start: 2022-03-16 | End: 2022-03-19 | Stop reason: HOSPADM

## 2022-03-16 RX ORDER — CLOPIDOGREL BISULFATE 75 MG/1
75 TABLET ORAL DAILY
Status: DISCONTINUED | OUTPATIENT
Start: 2022-03-16 | End: 2022-03-19 | Stop reason: HOSPADM

## 2022-03-16 RX ORDER — PANTOPRAZOLE SODIUM 40 MG/1
40 TABLET, DELAYED RELEASE ORAL
Status: DISCONTINUED | OUTPATIENT
Start: 2022-03-16 | End: 2022-03-19 | Stop reason: HOSPADM

## 2022-03-16 RX ORDER — SODIUM CHLORIDE, SODIUM LACTATE, POTASSIUM CHLORIDE, AND CALCIUM CHLORIDE .6; .31; .03; .02 G/100ML; G/100ML; G/100ML; G/100ML
250 INJECTION, SOLUTION INTRAVENOUS ONCE
Status: COMPLETED | OUTPATIENT
Start: 2022-03-16 | End: 2022-03-16

## 2022-03-16 RX ORDER — ATORVASTATIN CALCIUM 80 MG/1
80 TABLET, FILM COATED ORAL NIGHTLY
Status: DISCONTINUED | OUTPATIENT
Start: 2022-03-16 | End: 2022-03-19 | Stop reason: HOSPADM

## 2022-03-16 RX ORDER — ACETAMINOPHEN 325 MG/1
650 TABLET ORAL EVERY 6 HOURS PRN
Status: DISCONTINUED | OUTPATIENT
Start: 2022-03-16 | End: 2022-03-19 | Stop reason: HOSPADM

## 2022-03-16 RX ORDER — ONDANSETRON 4 MG/1
4 TABLET, ORALLY DISINTEGRATING ORAL EVERY 8 HOURS PRN
Status: DISCONTINUED | OUTPATIENT
Start: 2022-03-16 | End: 2022-03-19 | Stop reason: HOSPADM

## 2022-03-16 RX ORDER — LISINOPRIL 5 MG/1
2.5 TABLET ORAL DAILY
Status: DISCONTINUED | OUTPATIENT
Start: 2022-03-17 | End: 2022-03-16

## 2022-03-16 RX ORDER — FUROSEMIDE 10 MG/ML
INJECTION INTRAMUSCULAR; INTRAVENOUS
Status: COMPLETED
Start: 2022-03-16 | End: 2022-03-16

## 2022-03-16 RX ADMIN — SODIUM CHLORIDE, POTASSIUM CHLORIDE, SODIUM LACTATE AND CALCIUM CHLORIDE 250 ML: 600; 310; 30; 20 INJECTION, SOLUTION INTRAVENOUS at 13:43

## 2022-03-16 RX ADMIN — METOPROLOL SUCCINATE 25 MG: 25 TABLET, EXTENDED RELEASE ORAL at 22:49

## 2022-03-16 RX ADMIN — NITROGLYCERIN 0.5 INCH: 20 OINTMENT TOPICAL at 06:39

## 2022-03-16 RX ADMIN — ATORVASTATIN CALCIUM 80 MG: 80 TABLET, FILM COATED ORAL at 20:40

## 2022-03-16 RX ADMIN — FUROSEMIDE 20 MG: 10 INJECTION, SOLUTION INTRAMUSCULAR; INTRAVENOUS at 07:51

## 2022-03-16 RX ADMIN — SODIUM CHLORIDE 250 ML: 9 INJECTION, SOLUTION INTRAVENOUS at 23:58

## 2022-03-16 RX ADMIN — SERTRALINE HYDROCHLORIDE 50 MG: 50 TABLET ORAL at 17:13

## 2022-03-16 RX ADMIN — FUROSEMIDE 40 MG: 10 INJECTION, SOLUTION INTRAMUSCULAR; INTRAVENOUS at 17:13

## 2022-03-16 RX ADMIN — FUROSEMIDE 40 MG: 10 INJECTION, SOLUTION INTRAMUSCULAR; INTRAVENOUS at 07:39

## 2022-03-16 RX ADMIN — Medication 10 ML: at 20:41

## 2022-03-16 RX ADMIN — IPRATROPIUM BROMIDE AND ALBUTEROL SULFATE 1 AMPULE: .5; 2.5 SOLUTION RESPIRATORY (INHALATION) at 07:09

## 2022-03-16 RX ADMIN — APIXABAN 5 MG: 5 TABLET, FILM COATED ORAL at 22:49

## 2022-03-16 RX ADMIN — CLOPIDOGREL BISULFATE 75 MG: 75 TABLET ORAL at 14:59

## 2022-03-16 RX ADMIN — ALLOPURINOL 300 MG: 300 TABLET ORAL at 14:59

## 2022-03-16 RX ADMIN — FUROSEMIDE 20 MG: 10 INJECTION INTRAMUSCULAR; INTRAVENOUS at 07:51

## 2022-03-16 RX ADMIN — APIXABAN 5 MG: 5 TABLET, FILM COATED ORAL at 17:13

## 2022-03-16 RX ADMIN — PANTOPRAZOLE SODIUM 40 MG: 40 TABLET, DELAYED RELEASE ORAL at 14:59

## 2022-03-16 ASSESSMENT — ENCOUNTER SYMPTOMS
RHINORRHEA: 0
EYE PAIN: 0
PHOTOPHOBIA: 0
SORE THROAT: 0
SHORTNESS OF BREATH: 1
NAUSEA: 0
BLOOD IN STOOL: 0
ABDOMINAL PAIN: 0
COUGH: 0
CONSTIPATION: 0
VOMITING: 0

## 2022-03-16 ASSESSMENT — PAIN SCALES - WONG BAKER
WONGBAKER_NUMERICALRESPONSE: 0

## 2022-03-16 ASSESSMENT — PAIN SCALES - GENERAL
PAINLEVEL_OUTOF10: 0
PAINLEVEL_OUTOF10: 0

## 2022-03-16 NOTE — TELEPHONE ENCOUNTER
Writer contacted ED provider HUSSEIN REYES  to inform of 30 day readmission risk. ED provider informed writer of readmission.     Call Back: If you need to call back to inform of disposition you can contact me at [phone]

## 2022-03-16 NOTE — CONSULTS
[address] [facility]  [phone]      Attending Physician: Angela Painting MD  Reason for Consultation/Chief Complaint: Shortness of breath    Subjective   History of Present Illness:  Catracho Moore is a 78 y.o. female with a history of CAD s/p PCI to second diagonal artery with CECIL on 3/38/77, LV systolic heart failure secondary to ischemic cardiomyopathy, paroxysmal atrial fibrillation, GI bleeding, essential hypertension, hyperlipidemia, and MDS who initially presented to [facility] with shortness of breath. The patient was recently admitted with an NSTEMI and acute heart failure and at that time underwent PCI of her second diagonal artery with a Resolute Denis 2.5 x 18 mm CECIL. There was a long calcified 70% mid-LAD stenosis beyond the diagonal for which PCI was attempted but was later abandoned due to contrast load and prolonged procedure time and it was therefore managed medically. She was seen by Dr. Sunshine Kline in clinic on 3/9/22 and at that time it was recommended that her lasix be changed as needed dosing. She says that she has been weighing herself daily since that time and her weight has been stable, so she has not taken additional diuretic. However, last night after getting up to go to the bathroom she says that she became very short of breath. She went to lay back down, but her shortness of breath became more severe so she was taken to the ED. She denies any associated chest pain or lower extremity edema. On arrival to [facility] ED, the patient was markedly hypertensive and had oxygen saturation of 88% on room air. Her ECG showed sinus tachycardia with non-specific ST abnormality. Initial troponin T was negative and Pro-BNP was elevated at 12,196. Lactate and WBC count were elevated. Chest x-ray was suggestive of mild pulmonary edema. She was placed on supplemental oxygen and given nitropaste and 40 mg IV lasix.   She currently reports that her shortness of breath has improved significantly and BP is now 110/67 following transfer. Past Medical History:   has a past medical history of Allergic rhinitis, CAD S/P percutaneous coronary angioplasty, Cancer ([facility] [address] GI bleed, Glaucoma, Hyperlipidemia, Hypertension, Myelodysplastic syndrome ([facility] [address] Radiation therapy complication, and UTI (lower urinary tract infection). Surgical History:   has a past surgical history that includes Cataract removal with implant; Breast surgery (1999); Hysterectomy; knee surgery (2/6/2012); and CT BIOPSY BONE MARROW (3/7/2022). Social History:   reports that she quit smoking about 25 years ago. She has never used smokeless tobacco. She reports that she does not drink alcohol and does not use drugs. Family History:  family history is not on file. Home Medications:  Were reviewed and are listed in nursing record and/or below  Prior to Admission medications    Medication Sig Start Date End Date Taking?  Authorizing Provider   metoprolol succinate (TOPROL XL) 25 MG extended release tablet Take 1 tablet by mouth in the morning and at bedtime 3/9/22   Anastasia Villatoro MD   aspirin 81 MG chewable tablet Take 1 tablet by mouth daily 2/15/22   Xavier Harris MD   atorvastatin (LIPITOR) 80 MG tablet Take 1 tablet by mouth nightly 2/15/22   Xavier Harris MD   furosemide (LASIX) 40 MG tablet Take 1 tablet by mouth daily 2/15/22   Xavier Harris MD   clopidogrel (PLAVIX) 75 MG tablet Take 1 tablet by mouth daily 2/15/22   Xavier Harris MD   pantoprazole (PROTONIX) 40 MG tablet Take 1 tablet by mouth 2 times daily (before meals) 2/15/22   Xavier Harris MD   lisinopril (PRINIVIL;ZESTRIL) 2.5 MG tablet Take 1 tablet by mouth daily 2/15/22   Vinod Gooden MD   sertraline (ZOLOFT) 50 MG tablet TAKE ONE TABLET BY MOUTH DAILY 12/2/21   Historical Provider, MD   allopurinol (ZYLOPRIM) 300 MG tablet Take 300 mg by mouth daily    Historical Provider, MD apixaban (ELIQUIS) 5 MG TABS tablet Take by mouth 2 times daily    Historical Provider, MD   acetaminophen (TYLENOL) 500 MG tablet Take 500 mg by mouth every 6 hours as needed for Pain    Historical Provider, MD   Brinzolamide-Brimonidine (Markie Kerbs) 1-0.2 % SUSP Apply to eye     Historical Provider, MD   Bimatoprost (LUMIGAN) 0.01 % SOLN Apply 1 drop to eye nightly Each eye     Historical Provider, MD        CURRENT Medications:  lactated ringers bolus, Once  sodium chloride flush 0.9 % injection 5-40 mL, 2 times per day  sodium chloride flush 0.9 % injection 5-40 mL, PRN  0.9 % sodium chloride infusion, PRN  [START ON 3/17/2022] enoxaparin (LOVENOX) injection 40 mg, Daily  ondansetron (ZOFRAN-ODT) disintegrating tablet 4 mg, Q8H PRN   Or  ondansetron (ZOFRAN) injection 4 mg, Q6H PRN  polyethylene glycol (GLYCOLAX) packet 17 g, Daily PRN  acetaminophen (TYLENOL) tablet 650 mg, Q6H PRN   Or  acetaminophen (TYLENOL) suppository 650 mg, Q6H PRN  allopurinol (ZYLOPRIM) tablet 300 mg, Daily  apixaban (ELIQUIS) tablet 5 mg, BID  clopidogrel (PLAVIX) tablet 75 mg, Daily  atorvastatin (LIPITOR) tablet 80 mg, Nightly  [START ON 3/17/2022] lisinopril (PRINIVIL;ZESTRIL) tablet 2.5 mg, Daily  pantoprazole (PROTONIX) tablet 40 mg, BID AC  metoprolol succinate (TOPROL XL) extended release tablet 25 mg, BID  [START ON 3/17/2022] sertraline (ZOLOFT) tablet 50 mg, Daily        Allergies:  Oxycodone     Review of Systems:   Review of Systems   Constitutional: Negative for chills and fever. HENT: Negative for congestion, rhinorrhea and sore throat. Eyes: Negative for photophobia, pain and visual disturbance. Respiratory: Positive for shortness of breath. Negative for cough. Cardiovascular: Negative for chest pain, palpitations and leg swelling. Positive for orthopnea. Gastrointestinal: Negative for abdominal pain, blood in stool, constipation, nausea and vomiting.    Endocrine: Negative for cold intolerance and heat intolerance. Genitourinary: Negative for difficulty urinating, dysuria and hematuria. Musculoskeletal: Negative for arthralgias, joint swelling and myalgias. Skin: Negative for rash and wound. Allergic/Immunologic: Negative for environmental allergies and food allergies. Neurological: Negative for dizziness, syncope and light-headedness. Hematological: Negative for adenopathy. Does not bruise/bleed easily. Psychiatric/Behavioral: Negative for dysphoric mood. The patient is not nervous/anxious. Objective   PHYSICAL EXAM:    Vitals:    03/16/22 1245   BP: 110/67   Pulse: 89   Resp: 20   Temp: 98.3 °F (36.8 °C)   SpO2: 93%    Weight: 129 lb 6.4 oz (58.7 kg)       General: Very pleasant elderly female lying in bed wearing nasal cannula. HEENT: Normocephalic, atraumatic, non-icteric, hearing intact, nares normal, mucous membranes moist.  Neck: Supple, trachea midline. No adenopathy. No thyromegaly. +Hepatojugular reflux. Heart: Regular rate and rhythm. Normal S1 and S2. Grade II-III/VI holosystolic murmur radiating to apex. No rubs or gallops. Lungs: Normal respiratory effort. Mild bibasilar crackles. No wheezes or rhonchi. Abdomen: Soft, non-tender. Normoactive bowel sounds. No masses or organomegaly. Skin: No rashes, wounds, or lesions. Pulses: 2+ and symmetric. Extremities: No clubbing, cyanosis, or edema. Musculoskeletal: Spontaneously moves all four extremities. Psych: Normal mood and affect. Neuro: Alert and oriented to person, place, and time. No focal deficits noted.       Labs   CBC:   Lab Results   Component Value Date    WBC 27.1 03/16/2022    RBC 3.86 03/16/2022    HGB 12.0 03/16/2022    HCT 38.1 03/16/2022    MCV 98.9 03/16/2022    RDW 27.6 03/16/2022     03/16/2022     CMP:  Lab Results   Component Value Date     03/16/2022    K 4.6 03/16/2022     03/16/2022    CO2 25 03/16/2022    BUN 12 03/16/2022    CREATININE 0.6 03/16/2022    GFRAA >60 03/16/2022 GFRAA 136 03/12/2012    AGRATIO 1.6 03/16/2022    LABGLOM >60 03/16/2022    GLUCOSE 170 03/16/2022    PROT 6.6 03/16/2022    CALCIUM 9.0 03/16/2022    BILITOT 1.1 03/16/2022    ALKPHOS 198 03/16/2022    AST 12 03/16/2022    ALT 19 03/16/2022     PT/INR:  No results found for: PTINR  HgBA1c:  Lab Results   Component Value Date    LABA1C 5.3 04/17/2021     Lab Results   Component Value Date    TROPONINI <0.01 03/16/2022         Cardiac Data     Last EKG: Sinus tachycardia with non-specific ST abnormality    Echo:  TTE 9/30/21:  Conclusions   Summary   Left ventricular systolic function is normal with ejection fraction   estimated at 55-60 %. No regional wall motion abnormalities are noted. There is mild concentric left ventricular hypertrophy. Normal left ventricular diastolic filling pressure. Mild anterior and posterior mitral annular calcification is present. Moderate mitral regurgitation. The appears to be a small calcified echogenic mass near LVOT, likely   represents degenerative valve disease, and is less likely a vegetation. Study was compared to 2014 echo and the LVOT mass was not present in 2014. Mild aortic regurgitation is present. Moderate tricuspid regurgitation. Normal systolic pulmonary artery pressure (SPAP) estimated at 32 mmHg (RA   pressure 3 mmHg). Mild pulmonic regurgitation present. 1/23/2014 55% EF, mild MR. TTE 2/14/22:  Conclusions   Summary   Left ventricular cavity size is normal. There is mild left ventricular   hypertrophy. Overall left ventricular systolic function appears mildly   reduced with an ejection fraction of 45-50%. There is hypokinesis of the apex   and distal septum /anteroseptum. Diastolic filling parameters suggest grade   II diastolic dysfunction. There is increased LVEDP and LAP. The mitral valve leaflets appear normal in structure. Mitral annular   calcification is present. Mild to moderate mitral regurgitation is present.    No evidence of mitral valve stenosis. There is a linear mobile echodensity   off the anterior mitral leaflet (ventricular aspect), likely a torn cord. The left atrium is moderately dilated. Mild to moderate tricuspid regurgitation. Stress Test: N/A    Cath:  [facility] 2/11/22:  Left main normal.  LAD mid vessel 70% diffuse disease after second diagonal branch.  MABEL grade III flow present.  Second diagonal branch ostial 99% stenosis mid vessel 99% stenosis diffuse disease in between. Right coronary artery collateralized distal right coronary occluded  Circumflex and obtuse marginal branches with diffuse moderate disease. LVEDP 20 mmHg LVEF estimated at 40% with diffuse hypokinesia more pronounced in the anterior apex. Conclusion: Successful PTCA and stenting of diagonal branch #2 with 99% stenosis reduced to 0 and ostial stenosis with from 90 to 20% residual.  Plan:  hydration Lasix bedrest for tonight. Resume activity 8 AM on February 12, 2022.    Watch platelets and hemoglobin in this patient with GI bleeding and myelodysplastic syndrome. Hold Eliquis and continue Plavix and aspirin 81 mg daily    Other Studies:   Chest X-ray 3/16/22:  Mild indistinctness of the pulmonary vasculature, compatible with mild edema      Assessment and Plan      1. Acute LV systolic heart failure/ischemic cardiomyopathy - May have been exacerbated by hypertensive urgency.   BP is currently much better controlled and patient's reports improvement in shortness of breath after receiving IV lasix at outside hospital, but clinically still appears to be moderately hypervolemic.  -Recommend discontinuing IVF fluids  -Will give additional 40 mg IV lasix and pending response can likely transition to PO diuretic tomorrow  -Can increase lisinopril to 10 mg daily  -Continue metoprolol succinate 25 mg BID  -Monitor strict I/Os, obtain daily standing weights  -2L per day fluid restriction, low sodium diet  -Trend BMP and magnesium levels and replete factor modification. All questions and concerns were addressed to the patient/family. Alternatives to my treatment were discussed. The note was completed using EMR. Every effort was made to ensure accuracy; however, inadvertent computerized transcription errors may be present.

## 2022-03-16 NOTE — PROGRESS NOTES
PS sent to MD:    RE: Roger Gtz 1940 RM: [id number] Patient's WBC elevated at 27.1. Would you like repeat labs? Also, her lactic was 4.3. Would you like to repeat? Will monitor for orders, including admission orders. Thank you.  Need Callback: NO CALLBACK REQ A2 HEART FAILURE

## 2022-03-16 NOTE — PROGRESS NOTES
PS sent to MD:    RE: Kyree Green 1940 RM: 220 Patient is requesting her Zoloft now. She stated she has not taken it yet today and \"will have a panic attack if she does not get it. \"

## 2022-03-16 NOTE — ED PROVIDER NOTES
Emergency Department Encounter    Patient: Nicole Sheikh  MRN: [id number]  : 1940  Date of Evaluation: 3/16/2022  ED Provider:  Jimbo Ramirez MD    Briefly, Nicole Sheikh is a 78 y.o. female presented to the emergency department to the ER for evaluation of acute dyspnea, decompensated congestive heart failure suspected, with evidence of hypoxia recently discontinued off of her diuretics. Accelerated hypertension on arrival with positive hypoxia with no chronic supplemental oxygen dependence. History of chronic myelodysplastic syndrome. Her primary cardiologist is at [facility].  Recent stent placement. No active chest pain.     I have reviewed and interpreted all of the currently available lab results from this visit (if applicable)  Results for orders placed or performed during the hospital encounter of 22   COVID-19, Rapid    Specimen: Nasopharyngeal Swab   Result Value Ref Range    SARS-CoV-2, NAAT Not Detected Not Detected   CBC with Auto Differential   Result Value Ref Range    WBC 27.1 (H) 4.0 - 11.0 K/uL    RBC 3.86 (L) 4.00 - 5.20 M/uL    Hemoglobin 12.0 12.0 - 16.0 g/dL    Hematocrit 38.1 36.0 - 48.0 %    MCV 98.9 80.0 - 100.0 fL    MCH 31.2 26.0 - 34.0 pg    MCHC 31.6 31.0 - 36.0 g/dL    RDW 27.6 (H) 12.4 - 15.4 %    Platelets 947 (H) 698 - 450 K/uL    MPV 8.1 5.0 - 10.5 fL    PLATELET SLIDE REVIEW Adequate     SLIDE REVIEW see below     Neutrophils % 87.0 %    Lymphocytes % 5.0 %    Monocytes % 8.0 %    Eosinophils % 0.0 %    Basophils % 0.0 %    Neutrophils Absolute 23.6 (H) 1.7 - 7.7 K/uL    Lymphocytes Absolute 1.4 1.0 - 5.1 K/uL    Monocytes Absolute 2.2 (H) 0.0 - 1.3 K/uL    Eosinophils Absolute 0.0 0.0 - 0.6 K/uL    Basophils Absolute 0.0 0.0 - 0.2 K/uL   Comprehensive Metabolic Panel w/ Reflex to MG   Result Value Ref Range    Sodium 140 136 - 145 mmol/L    Potassium reflex Magnesium 4.6 3.5 - 5.1 mmol/L    Chloride 100 99 - 110 mmol/L    CO2 25 21 - 32 mmol/L Anion Gap 15 3 - 16    Glucose 170 (H) 70 - 99 mg/dL    BUN 12 7 - 20 mg/dL    CREATININE 0.6 0.6 - 1.2 mg/dL    GFR Non-African American >60 >60    GFR African American >60 >60    Calcium 9.0 8.3 - 10.6 mg/dL    Total Protein 6.6 6.4 - 8.2 g/dL    Albumin 4.1 3.4 - 5.0 g/dL    Albumin/Globulin Ratio 1.6 1.1 - 2.2    Total Bilirubin 1.1 (H) 0.0 - 1.0 mg/dL    Alkaline Phosphatase 198 (H) 40 - 129 U/L    ALT 19 10 - 40 U/L    AST 12 (L) 15 - 37 U/L   Troponin   Result Value Ref Range    Troponin <0.01 <0.01 ng/mL   Brain Natriuretic Peptide   Result Value Ref Range    Pro-BNP 12,196 (H) 0 - 449 pg/mL   Blood Gas, Venous   Result Value Ref Range    pH, Sukhdeep 7.321 (L) 7.350 - 7.450    pCO2, Sukhdeep 52.7 (H) 40.0 - 50.0 mmHg    pO2, Sukhdeep 52.1 (H) 25.0 - 40.0 mmHg    HCO3, Venous 26.6 23.0 - 29.0 mmol/L    Base Excess, Sukhdeep -0.2 -3.0 - 3.0 mmol/L    O2 Sat, Sukhdeep 84 Not Established %    Carboxyhemoglobin 1.9 (H) 0.0 - 1.5 %    MetHgb, Sukhdeep 0.3 <1.5 %    TC02 (Calc), Sukhdeep 28 Not Established mmol/L    O2 Therapy Unknown    Lactate, Sepsis   Result Value Ref Range    Lactic Acid, Sepsis 3.9 (H) 0.4 - 1.9 mmol/L   EKG 12 Lead   Result Value Ref Range    Ventricular Rate 118 BPM    Atrial Rate 118 BPM    P-R Interval 146 ms    QRS Duration 78 ms    Q-T Interval 312 ms    QTc Calculation (Bazett) 437 ms    P Axis 59 degrees    R Axis 64 degrees    T Axis 16 degrees    Diagnosis       Sinus tachycardiaNonspecific ST abnormalityNo previous ECGs availableConfirmed by SAMIRA COLMENARES MD ([id number]) on 3/16/2022 7:39:15 AM      Radiographs (if obtained):    [] Radiologist's Report Reviewed:  XR CHEST PORTABLE   Final Result   Mild indistinctness of the pulmonary vasculature, compatible with mild edema             MDM:  Patient has evidence of acute decompensated congestive heart failure with recent diuretic discontinuation, she is chronic myelodysplastic syndrome which is likely given her thrombocytosis white count and hemoglobin dysfunction. Lactate was slightly elevated, but she has no fever no productive cough no fevers or rigors. The patient received preload and afterload reduction with nitroglycerin 2 L supplemental oxygen and diuresis. She has no troponin leak. She is stable for transport and will be admitted for diuresis and cardiovascular rule out    Clinical Impression:  1. Acute respiratory failure with hypoxia ([facility] [address]    2. Acute on chronic combined systolic and diastolic congestive heart failure (HCC)    3. Leukocytosis, unspecified type      Disposition referral (if applicable):  No follow-up provider specified. Disposition medications (if applicable):  New Prescriptions    No medications on file       Comment: Please note this report has been produced using speech recognition software and may contain errors related to that system including errors in grammar, punctuation, and spelling, as well as words and phrases that may be inappropriate. Efforts were made to edit the dictations.       Salomon Alvarez MD  58/71/57 [id number]

## 2022-03-16 NOTE — ED NOTES
Report to [facility] for transport to [facility].      Dillon Riddle RN  03/16/22 [id number]

## 2022-03-16 NOTE — PROGRESS NOTES
PS sent to MD:  Patient's daughter stated she was to have a EGD with GI performed outpatient soon. She is requesting GI be consulted for this to be performed inpatient, this admission. Will monitor for orders. Thank you      Addendum: No new orders.

## 2022-03-16 NOTE — PROGRESS NOTES
250 ml fluid bolus administered per hospitalist order. Patient's WBC elevated >20 and Lactic critical at 4.3. MD aware of patient's fluid volume overload. Order remains.

## 2022-03-16 NOTE — PLAN OF CARE
Problem: OXYGENATION/RESPIRATORY FUNCTION  Goal: Patient will maintain patent airway  Outcome: Ongoing     Problem: HEMODYNAMIC STATUS  Goal: Patient has stable vital signs and fluid balance  Outcome: Ongoing       Patient's EF (Ejection Fraction) is greater than 40%    Heart Failure Medications:  Diuretics[de-identified] None    (One of the following REQUIRED for EF </= 40%/SYSTOLIC FAILURE but MAY be used in EF% >40%/DIASTOLIC FAILURE)        ACE[de-identified] None        ARB[de-identified] None         ARNI[de-identified] None    (Beta Blockers)  NON- Evidenced Based Beta Blocker (for EF% >40%/DIASTOLIC FAILURE): None    Evidenced Based Beta Blocker::(REQUIRED for EF% <40%/SYSTOLIC FAILURE) None  . .................................................................................................................................................. Patient's weights and intake/output reviewed: Yes    Patient's Last Weight: 129 lb 6 oz lbs obtained by standing scale. Difference of 0 lbs more than last documented weight. No intake or output data in the 24 hours ending 03/16/22 1325    Comorbidities Reviewed Yes    Patient has a past medical history of Allergic rhinitis, CAD S/P percutaneous coronary angioplasty, Cancer ([facility] [address] GI bleed, Glaucoma, Hyperlipidemia, Hypertension, Myelodysplastic syndrome ([facility] [address] Radiation therapy complication, and UTI (lower urinary tract infection). >>For CHF and Comorbidity documentation on Education Time and Topics, please see Education Tab    Progressive Mobility Assessment:  What is this patient's Current Level of Mobility?: Ambulatory- with Assistance  How was this patient Mobilized today?: Edge of Bed and Up in Room, ambulated 50 ft                 With Whom? Nurse, PCA, and Self                 Level of Difficulty/Assistance: 1x Assist     Pt resting in bed at this time on  2 L O2. Pt with complaints of shortness of breath. Pt without lower extremity edema.      Patient and/or Family's stated Goal of Care this Admission: reduce shortness of breath, increase activity tolerance, better understand heart failure and disease management, be more comfortable, and reduce lower extremity edema prior to discharge        :

## 2022-03-16 NOTE — PROGRESS NOTES
4 Eyes Skin Assessment     The patient is being assess for  Admission    I agree that 2 RN's have performed a thorough Head to Toe Skin Assessment on the patient. ALL assessment sites listed below have been assessed. Areas assessed by both nurses:   [x]   Head, Face, and Ears   [x]   Shoulders, Back, and Chest  [x]   Arms, Elbows, and Hands   [x]   Coccyx, Sacrum, and IschIum  [x]   Legs, Feet, and Heels        Does the Patient have Skin Breakdown?  scattered bruising and abrasions noted. Abasions on right heel.           Toni Prevention initiated:  Yes   Wound Care Orders initiated:  No      C nurse consulted for Pressure Injury (Stage 3,4, Unstageable, DTI, NWPT, and Complex wounds), New and Established Ostomies:  No      Nurse 1 eSignature: Electronically signed by Shadia Wright RN on 3/16/22 at 1:04 PM EDT    **SHARE this note so that the co-signing nurse is able to place an eSignature**    Nurse 2 eSignature: Electronically signed by Shani Roldan RN on 3/16/22 at 1:10 PM EDT

## 2022-03-16 NOTE — PROGRESS NOTES
PS sent to MD:    RE: Kayla Esteves 1940 RM: 220 Patient is [place] from [place]. [place]. Will monitor for orders. Thanks!  Need Callback: NO CALLBACK REQ A2 HEART FAILURE  Unread

## 2022-03-16 NOTE — CONSULTS
Consult placed    [facility]: [address] Cardiology, MD paged  Date:3/16/2022,  Time:2:03 PM        Electronically signed by Heather Garibay on 3/16/2022 at 2:03 PM

## 2022-03-16 NOTE — PLAN OF CARE
Problem: Skin Integrity:  Goal: Will show no infection signs and symptoms  Description: Will show no infection signs and symptoms  Outcome: Ongoing     Problem: Safety:  Goal: Free from accidental physical injury  Description: Free from accidental physical injury  Outcome: Ongoing     Problem: Daily Care:  Goal: Daily care needs are met  Description: Daily care needs are met  Outcome: Ongoing     Problem: Pain:  Goal: Patient's pain/discomfort is manageable  Description: Patient's pain/discomfort is manageable  Outcome: Ongoing     Problem: Skin Integrity:  Goal: Skin integrity will stabilize  Description: Skin integrity will stabilize  Outcome: Ongoing     Problem: Discharge Planning:  Goal: Patients continuum of care needs are met  Description: Patients continuum of care needs are met  Outcome: Ongoing

## 2022-03-16 NOTE — H&P
Hospital Medicine History & Physical      PCP: Kenyon Early DO    Date of Admission: 3/16/2022    Date of Service: Pt seen/examined on 3/16/22 and Admitted to Inpatient with expected LOS greater than two midnights due to medical therapy. Chief Complaint:  sob      History Of Present Illness:      78 y.o. female with hx of MDS, cad/cardiomyopathy, HTN, HLD who presented to [facility] with acute onset of SOB around 2am today. She had to get up to use bathroom this AM and noted to feel anxous and couldn't relax. Pt upon returning to be, noted orthopnea and LI but no CP. No issues all day Tuesday. Of note, pt was recently at [facility] for CHF exacerbation and nstemi(had stent placed). -pt presented to [address] ER via EMS and was transferred to [facility] for further care. -of note, pt states her cardiologist Dr. Max Colvin stopped lasix last week but instructed pt to weigh daily    ER course- pt noted to have 88% sats, bcx obtained, breathing tx given, given iv lasix(with improvement), nitrobid paste    Past Medical History:          Diagnosis Date    Allergic rhinitis     CAD S/P percutaneous coronary angioplasty 02/11/2022    Diagonal 99% stented 2.5 x 18 mm Denis CECIL    Cancer ([facility] [address]     breast    GI bleed     Upper GI Bleed with knee surgery 2012    Glaucoma     bilateral    Hyperlipidemia     Hypertension     Myelodysplastic syndrome ([address]     Radiation therapy complication 6922    UTI (lower urinary tract infection)        Past Surgical History:          Procedure Laterality Date    BREAST SURGERY  1999    lumpectomy right    CATARACT REMOVAL WITH IMPLANT      bilateral    CT BONE MARROW BIOPSY  3/7/2022    CT BONE MARROW BIOPSY 3/7/2022 [facility] CT SCAN    HYSTERECTOMY      age 34    KNEE SURGERY  2/6/2012    left side       Medications Prior to Admission:      Prior to Admission medications    Medication Sig Start Date End Date Taking?  Authorizing Provider   metoprolol succinate (TOPROL XL) 25 MG extended release tablet Take 1 tablet by mouth in the morning and at bedtime 3/9/22   Yue Cortez MD   aspirin 81 MG chewable tablet Take 1 tablet by mouth daily 2/15/22   Belinda Lee MD   atorvastatin (LIPITOR) 80 MG tablet Take 1 tablet by mouth nightly 2/15/22   Belinda Lee MD   furosemide (LASIX) 40 MG tablet Take 1 tablet by mouth daily 2/15/22   Belinda Lee MD   clopidogrel (PLAVIX) 75 MG tablet Take 1 tablet by mouth daily 2/15/22   Belinda Lee MD   pantoprazole (PROTONIX) 40 MG tablet Take 1 tablet by mouth 2 times daily (before meals) 2/15/22   Belinda Lee MD   lisinopril (PRINIVIL;ZESTRIL) 2.5 MG tablet Take 1 tablet by mouth daily 2/15/22   Yola Armstrong MD   sertraline (ZOLOFT) 50 MG tablet TAKE ONE TABLET BY MOUTH DAILY 12/2/21   Historical Provider, MD   allopurinol (ZYLOPRIM) 300 MG tablet Take 300 mg by mouth daily    Historical Provider, MD   apixaban (ELIQUIS) 5 MG TABS tablet Take by mouth 2 times daily    Historical Provider, MD   acetaminophen (TYLENOL) 500 MG tablet Take 500 mg by mouth every 6 hours as needed for Pain    Historical Provider, MD   Brinzolamide-Brimonidine (Kvng Neil) 1-0.2 % SUSP Apply to eye     Historical Provider, MD   Bimatoprost (LUMIGAN) 0.01 % SOLN Apply 1 drop to eye nightly Each eye     Historical Provider, MD       Allergies:  Oxycodone    Social History:      The patient currently lives at home    TOBACCO:   reports that she quit smoking about 25 years ago. She has never used smokeless tobacco.  ETOH:   reports no history of alcohol use. E-Cigarettes/Vaping Use     Questions Responses    E-Cigarette/Vaping Use Never User    Start Date     Passive Exposure     Quit Date     Counseling Given     Comments             Family History:       Reviewed in detail and negative for DM, CAD, Cancer, CVA. Positive as follows:    No family history on file. REVIEW OF SYSTEMS COMPLETED:   Pertinent positives as noted in the HPI.  All other systems reviewed and negative. PHYSICAL EXAM PERFORMED:    BP (!) 102/56   Pulse 89   Temp 97.5 °F (36.4 °C) (Oral)   Resp 18   Ht 5' 1\" (1.549 m)   Wt 129 lb 6.4 oz (58.7 kg)   SpO2 91%   BMI 24.45 kg/m²     General appearance:  No apparent distress, appears stated age and cooperative. HEENT:  Normal cephalic, atraumatic without obvious deformity. Pupils equal, round, and reactive to light. Extra ocular muscles intact. Conjunctivae/corneas clear. Neck: Supple, with full range of motion. No jugular venous distention. Trachea midline. Respiratory:  Normal respiratory effort. Clear to auscultation, bilaterally without Wheezes/Rhonchi. Mild rales at bases  Cardiovascular:  Regular rate and rhythm with normal S1/S2 without murmurs, rubs or gallops. Abdomen: Soft, non-tender, non-distended with normal bowel sounds. Musculoskeletal:  No clubbing, cyanosis or edema bilaterally. Full range of motion without deformity. Skin: Skin color, texture, turgor normal.  No rashes or lesions. Neurologic:  Neurovascularly intact without any focal sensory/motor deficits. Cranial nerves: II-XII intact, grossly non-focal.  Psychiatric:  Alert and oriented, thought content appropriate, normal insight  Capillary Refill: Brisk,3 seconds, normal  Peripheral Pulses: +2 palpable, equal bilaterally       Labs:     Recent Labs     03/16/22  0630 03/16/22  1541   WBC 27.1* 20.9*   HGB 12.0 10.5*   HCT 38.1 33.3*   * 511*     Recent Labs     03/16/22  0630      K 4.6      CO2 25   BUN 12   CREATININE 0.6   CALCIUM 9.0     Recent Labs     03/16/22  0630   AST 12*   ALT 19   BILITOT 1.1*   ALKPHOS 198*     No results for input(s): INR in the last 72 hours.   Recent Labs     03/16/22  0630 03/16/22  1541   TROPONINI <0.01 <0.01       Urinalysis:      Lab Results   Component Value Date    NITRU POSITIVE 02/09/2022    WBCUA 10-20 02/09/2022    BACTERIA 1+ 02/09/2022    RBCUA 0-2 02/09/2022    BLOODU TRACE-INTACT 02/09/2022    SPECGRAV 1.015 02/09/2022    GLUCOSEU Negative 02/09/2022    GLUCOSEU Negative 03/08/2012       Radiology:      FINDINGS:   Heart size is normal.  Mediastinal contours are normal.  There is mild   indistinctness of the pulmonary vasculature.  No focal lung consolidation   noted       Degenerative changes are seen in the shoulder joints.  Loose bodies are seen       CXR:  Impression   Mild indistinctness of the pulmonary vasculature, compatible with mild edema       EKG:  I have reviewed the EKG with the following interpretation: sinus tach, 118, nl axis, nonspecific st changes    No orders to display       ASSESSMENT:    Active Hospital Problems    Diagnosis Date Noted    Acute respiratory failure ([facility] [address] [J96.00] 03/16/2022         PLAN:    acute resp failure- not on oxygen at home, noted 88% on ra on arrival to ER, improved here after getting iv lasix  -Tele  -Trend tha    Lactic acidosis/leukocytosis-  Unclear etiology, possibly reactive, no obvious source of infection noted  -Trialled gentle ivfs  -trended labs    Cardiomyopathy- Echo 2/2022(EF  45%, d2 dd, mod mr(?torn cord on anterior mitral leaflet), mod tr  -on bb/acei    Afib- rate controlled  On eliquis, bb    HTN- unclear if controlled or not at home  -on acei/bb    HLD-on statin    MDS- unclear if related to wbc  -mgmtt as outpt    CAD- with recent nstemi/stent at [facility]  -continued statin/plavix  -no longer on asa    Gout- continued allopurinol    Recent gastric ulcer/porcelain gallbladder- sees dr. Dior Quevedo  -supposed to have outpt EGD, consult GI here per family request    DVT Prophylaxis: on home eliquis  Diet: ADULT DIET; Regular  Code Status: Full Code    PT/OT Eval Status: not ordered    Dispo - pending workup, GI consulted       Rebekah Chavez MD    Thank you Doris García DO for the opportunity to be involved in this patient's care. If you have any questions or concerns please feel free to contact me at [phone].

## 2022-03-16 NOTE — ED NOTES
Attempted to call report x2 without answer from receiving nurse.      Lauren George, RN  03/16/22 3075

## 2022-03-17 LAB
ANION GAP SERPL CALCULATED.3IONS-SCNC: 13 MMOL/L (ref 3–16)
ANISOCYTOSIS: ABNORMAL
BANDED NEUTROPHILS RELATIVE PERCENT: 18 % (ref 0–7)
BASOPHILS ABSOLUTE: 0.4 K/UL (ref 0–0.2)
BASOPHILS RELATIVE PERCENT: 2 %
BUN BLDV-MCNC: 16 MG/DL (ref 7–20)
CALCIUM SERPL-MCNC: 8.5 MG/DL (ref 8.3–10.6)
CHLORIDE BLD-SCNC: 99 MMOL/L (ref 99–110)
CO2: 24 MMOL/L (ref 21–32)
CREAT SERPL-MCNC: 0.6 MG/DL (ref 0.6–1.2)
EOSINOPHILS ABSOLUTE: 0.4 K/UL (ref 0–0.6)
EOSINOPHILS RELATIVE PERCENT: 2 %
GFR AFRICAN AMERICAN: >60
GFR NON-AFRICAN AMERICAN: >60
GLUCOSE BLD-MCNC: 105 MG/DL (ref 70–99)
HCT VFR BLD CALC: 29.7 % (ref 36–48)
HEMOGLOBIN: 9.4 G/DL (ref 12–16)
INR BLD: 2.35 (ref 0.88–1.12)
IRON SATURATION: 41 % (ref 15–50)
IRON: 99 UG/DL (ref 37–145)
LACTIC ACID: 3.6 MMOL/L (ref 0.4–2)
LACTIC ACID: 3.9 MMOL/L (ref 0.4–2)
LACTIC ACID: 4 MMOL/L (ref 0.4–2)
LACTIC ACID: 4.1 MMOL/L (ref 0.4–2)
LYMPHOCYTES ABSOLUTE: 1.1 K/UL (ref 1–5.1)
LYMPHOCYTES RELATIVE PERCENT: 6 %
MACROCYTES: ABNORMAL
MCH RBC QN AUTO: 31.5 PG (ref 26–34)
MCHC RBC AUTO-ENTMCNC: 31.8 G/DL (ref 31–36)
MCV RBC AUTO: 98.9 FL (ref 80–100)
MICROCYTES: ABNORMAL
MONOCYTES ABSOLUTE: 0.7 K/UL (ref 0–1.3)
MONOCYTES RELATIVE PERCENT: 4 %
NEUTROPHILS ABSOLUTE: 15.7 K/UL (ref 1.7–7.7)
NEUTROPHILS RELATIVE PERCENT: 68 %
OVALOCYTES: ABNORMAL
PDW BLD-RTO: 27.1 % (ref 12.4–15.4)
PLATELET # BLD: 466 K/UL (ref 135–450)
PLATELET SLIDE REVIEW: ABNORMAL
PMV BLD AUTO: 8.7 FL (ref 5–10.5)
POIKILOCYTES: ABNORMAL
POLYCHROMASIA: ABNORMAL
POTASSIUM REFLEX MAGNESIUM: 5.4 MMOL/L (ref 3.5–5.1)
PROTHROMBIN TIME: 27.5 SEC (ref 9.9–12.7)
RBC # BLD: 3 M/UL (ref 4–5.2)
SCHISTOCYTES: ABNORMAL
SLIDE REVIEW: ABNORMAL
SODIUM BLD-SCNC: 136 MMOL/L (ref 136–145)
SPHEROCYTES: ABNORMAL
TARGET CELLS: ABNORMAL
TOTAL IRON BINDING CAPACITY: 242 UG/DL (ref 260–445)
WBC # BLD: 18.2 K/UL (ref 4–11)

## 2022-03-17 PROCEDURE — 2060000000 HC ICU INTERMEDIATE R&B

## 2022-03-17 PROCEDURE — 2700000000 HC OXYGEN THERAPY PER DAY

## 2022-03-17 PROCEDURE — 83605 ASSAY OF LACTIC ACID: CPT

## 2022-03-17 PROCEDURE — 94761 N-INVAS EAR/PLS OXIMETRY MLT: CPT

## 2022-03-17 PROCEDURE — P9045 ALBUMIN (HUMAN), 5%, 250 ML: HCPCS | Performed by: INTERNAL MEDICINE

## 2022-03-17 PROCEDURE — 80048 BASIC METABOLIC PNL TOTAL CA: CPT

## 2022-03-17 PROCEDURE — 6370000000 HC RX 637 (ALT 250 FOR IP): Performed by: INTERNAL MEDICINE

## 2022-03-17 PROCEDURE — 36415 COLL VENOUS BLD VENIPUNCTURE: CPT

## 2022-03-17 PROCEDURE — 6360000002 HC RX W HCPCS: Performed by: INTERNAL MEDICINE

## 2022-03-17 PROCEDURE — 99221 1ST HOSP IP/OBS SF/LOW 40: CPT | Performed by: INTERNAL MEDICINE

## 2022-03-17 PROCEDURE — 83550 IRON BINDING TEST: CPT

## 2022-03-17 PROCEDURE — 99232 SBSQ HOSP IP/OBS MODERATE 35: CPT | Performed by: NURSE PRACTITIONER

## 2022-03-17 PROCEDURE — 83540 ASSAY OF IRON: CPT

## 2022-03-17 PROCEDURE — 2580000003 HC RX 258: Performed by: INTERNAL MEDICINE

## 2022-03-17 PROCEDURE — 85610 PROTHROMBIN TIME: CPT

## 2022-03-17 PROCEDURE — 85025 COMPLETE CBC W/AUTO DIFF WBC: CPT

## 2022-03-17 PROCEDURE — 6370000000 HC RX 637 (ALT 250 FOR IP): Performed by: NURSE PRACTITIONER

## 2022-03-17 RX ORDER — ALBUMIN, HUMAN INJ 5% 5 %
25 SOLUTION INTRAVENOUS ONCE
Status: COMPLETED | OUTPATIENT
Start: 2022-03-17 | End: 2022-03-17

## 2022-03-17 RX ORDER — FUROSEMIDE 20 MG/1
20 TABLET ORAL DAILY
Status: DISCONTINUED | OUTPATIENT
Start: 2022-03-17 | End: 2022-03-18

## 2022-03-17 RX ADMIN — PANTOPRAZOLE SODIUM 40 MG: 40 TABLET, DELAYED RELEASE ORAL at 06:35

## 2022-03-17 RX ADMIN — ALLOPURINOL 300 MG: 300 TABLET ORAL at 10:50

## 2022-03-17 RX ADMIN — CLOPIDOGREL BISULFATE 75 MG: 75 TABLET ORAL at 10:49

## 2022-03-17 RX ADMIN — ALBUMIN (HUMAN) 25 G: 12.5 INJECTION, SOLUTION INTRAVENOUS at 13:55

## 2022-03-17 RX ADMIN — METOPROLOL SUCCINATE 25 MG: 25 TABLET, EXTENDED RELEASE ORAL at 21:12

## 2022-03-17 RX ADMIN — APIXABAN 5 MG: 5 TABLET, FILM COATED ORAL at 21:14

## 2022-03-17 RX ADMIN — PANTOPRAZOLE SODIUM 40 MG: 40 TABLET, DELAYED RELEASE ORAL at 18:25

## 2022-03-17 RX ADMIN — FUROSEMIDE 20 MG: 20 TABLET ORAL at 12:03

## 2022-03-17 RX ADMIN — SERTRALINE HYDROCHLORIDE 50 MG: 50 TABLET ORAL at 10:50

## 2022-03-17 RX ADMIN — METOPROLOL SUCCINATE 25 MG: 25 TABLET, EXTENDED RELEASE ORAL at 10:49

## 2022-03-17 RX ADMIN — Medication 10 ML: at 21:14

## 2022-03-17 RX ADMIN — LISINOPRIL 10 MG: 10 TABLET ORAL at 10:50

## 2022-03-17 RX ADMIN — APIXABAN 5 MG: 5 TABLET, FILM COATED ORAL at 10:50

## 2022-03-17 RX ADMIN — Medication 10 ML: at 10:49

## 2022-03-17 RX ADMIN — ATORVASTATIN CALCIUM 80 MG: 80 TABLET, FILM COATED ORAL at 21:14

## 2022-03-17 ASSESSMENT — PAIN DESCRIPTION - PROGRESSION: CLINICAL_PROGRESSION: GRADUALLY IMPROVING

## 2022-03-17 ASSESSMENT — PAIN SCALES - GENERAL
PAINLEVEL_OUTOF10: 0
PAINLEVEL_OUTOF10: 0

## 2022-03-17 ASSESSMENT — PAIN SCALES - WONG BAKER
WONGBAKER_NUMERICALRESPONSE: 0

## 2022-03-17 NOTE — CONSULTS
Consult placed    Who: Simón ANNE  Date:3/17/2022,  Time:11:37 AM        Electronically signed by Anna Gallego on 3/17/2022 at 11:37 AM

## 2022-03-17 NOTE — CONSULTS
Consult placed    Who: Dr. Arlin Mckeon - perfect serve  Date:3/17/2022,  Time:3:53 PM        Electronically signed by Becki Ordonez on 3/17/2022 at 3:53 PM

## 2022-03-17 NOTE — PROGRESS NOTES
[address]   Daily Progress Note    Admit Date:  3/16/2022  HPI:  No chief complaint on file. Interval history: Capri Mandujano is being followed for shortness of breath. Hx of CAD s/p PCI 2/2022, ishcemic cardimoypathy, PAF, GI bleed, HLD, HTN    Subjective:  Ms. Yoanna Fregoso breathing is improved. No chest pain.      Objective:   /68   Pulse 90   Temp 98 °F (36.7 °C) (Oral)   Resp 18   Ht 5' 1\" (1.549 m)   Wt 129 lb 6.4 oz (58.7 kg)   SpO2 92%   BMI 24.45 kg/m²     Intake/Output Summary (Last 24 hours) at 3/17/2022 1121  Last data filed at 3/17/2022 0636  Gross per 24 hour   Intake 1249.56 ml   Output 900 ml   Net 349.56 ml       NYHA: IV    Physical Exam:  General:  Awake, alert, NAD, appears stated age, laying relatively flat in the bed   Skin:  Warm and dry  Neck:  JVD<8  Chest:  Clear to auscultation, no wheezes/rhonchi/rales  Telemetry: NSR  Cardiovascular:  RRR S1S2, no m/r/g   Abdomen:  Soft, nontender, +bowel sounds  Extremities:  No  bilateral lower extremity edema    Medications:    albumin human  25 g IntraVENous Once    sodium chloride flush  5-40 mL IntraVENous 2 times per day    allopurinol  300 mg Oral Daily    apixaban  5 mg Oral BID    clopidogrel  75 mg Oral Daily    atorvastatin  80 mg Oral Nightly    pantoprazole  40 mg Oral BID AC    metoprolol succinate  25 mg Oral BID    sertraline  50 mg Oral Daily    [Held by provider] lisinopril  10 mg Oral Daily      sodium chloride         Lab Data:  CBC:   Recent Labs     03/16/22  0630 03/16/22  1541   WBC 27.1* 20.9*   HGB 12.0 10.5*   * 511*     BMP:    Recent Labs     03/16/22  0630 03/17/22  0746    136   K 4.6 5.4*   CO2 25 24   BUN 12 16   CREATININE 0.6 0.6     INR:    Recent Labs     03/17/22  0746   INR 2.35*     BNP:    Recent Labs     03/16/22  0630   PROBNP 12,196*     Lab Results   Component Value Date    LVEF 48 02/14/2022       Testing:  Echo:  TTE 9/30/21:  Conclusions   Summary   Left ventricular systolic function is normal with ejection fraction   estimated at 55-60 %.   No regional wall motion abnormalities are noted.   There is mild concentric left ventricular hypertrophy.   Normal left ventricular diastolic filling pressure.   Mild anterior and posterior mitral annular calcification is present.   Moderate mitral regurgitation.   The appears to be a small calcified echogenic mass near LVOT, likely   represents degenerative valve disease, and is less likely a vegetation.   Study was compared to 2014 echo and the LVOT mass was not present in 2014.   Mild aortic regurgitation is present.   Moderate tricuspid regurgitation.   Normal systolic pulmonary artery pressure (SPAP) estimated at 32 mmHg (RA   pressure 3 mmHg).  Mild pulmonic regurgitation present.   1/23/2014 55% EF, mild MR.     [facility] 2/11/22:  Left main normal.  LAD mid vessel 70% diffuse disease after second diagonal branch.  MABEL grade III flow present.  Second diagonal branch ostial 99% stenosis mid vessel 99% stenosis diffuse disease in between. Right coronary artery collateralized distal right coronary occluded  Circumflex and obtuse marginal branches with diffuse moderate disease. LVEDP 20 mmHg LVEF estimated at 40% with diffuse hypokinesia more pronounced in the anterior apex. Conclusion: Successful PTCA and stenting of diagonal branch #2 with 99% stenosis reduced to 0 and ostial stenosis with from 90 to 20% residual.  Plan:  hydration Lasix bedrest for tonight. Resume activity 8 AM on February 12, 2022.    Watch platelets and hemoglobin in this patient with GI bleeding and myelodysplastic syndrome. Hold Eliquis and continue Plavix and aspirin 81 mg daily    TTE 2/14/22:  Conclusions   Summary   Left ventricular cavity size is normal. There is mild left ventricular   hypertrophy. Overall left ventricular systolic function appears mildly   reduced with an ejection fraction of 45-50%. There is hypokinesis of the apex   and distal septum /anteroseptum. Diastolic filling parameters suggest grade   II diastolic dysfunction. There is increased LVEDP and LAP.   The mitral valve leaflets appear normal in structure. Mitral annular   calcification is present. Mild to moderate mitral regurgitation is present.   No evidence of mitral valve stenosis. There is a linear mobile echodensity   off the anterior mitral leaflet (ventricular aspect), likely a torn cord.   The left atrium is moderately dilated.   Mild to moderate tricuspid regurgitation. Principal Problem:    Acute respiratory failure ([address]  Resolved Problems:    * No resolved hospital problems. *      Assessment:  Acute hypoxemia resp failure due to pulmonary edema- improving   Acute on chronic combined heart failure  Heart failure with borderline EF   CAD s/p PCI to 2nd diag 2/11/22  Ischemic cardiomyopathy  PAF on Eliquis   HTN  Lactic acidosis - unknown etiology  Hx of MDS    Plan:  Daily weights, daily BMP- added CHF orderset   Holding lisnopril due to elevated potassium, repeat BMP tomorrow and will plan to restart  Start lasix 20mg PO daily - recommend we continue her on low dose diuretic therapy at discharge  Continue plavix and eliquis (off of aspirin to avoid triple therapy)  Continue toprol 25mg BID     Discharge planning for tomorrow from CHF perspective     Education provided today Disease process and medications discussed. Questions answered fully. Encouraged daily monitoring of the patient's weight.   Total Time spent educating today 15 minutes     YONY Fregoso - CNP,  3/17/2022, 12:21 PM

## 2022-03-17 NOTE — CARE COORDINATION
CASE MANAGEMENT INITIAL ASSESSMENT      Reviewed chart and completed assessment with patient:at bedside  Family present: no  Explained Case Management role/services. Primary contact information:see below    Health Care Decision Maker :   Primary Decision Maker: Evelyn Reza - Child - [phone]    Secondary Decision Maker: Ugo Moran - Child - [phone]          Can this person be reached and be able to respond quickly, such as within a few minutes or hours? Yes    Admit date/status:03/16/20222  Diagnosis:Acute respiratory failure   Is this a Readmission?:  Yes, pt returned w/new SOB, was recently at [facility] and had heart stent placed      Insurance:[employer]   Precert required for SNF: Yes       3 night stay required: No    Living arrangements, Adls, care needs, prior to admission:Pt live alone in a 2 story home w/3 MORRIS, lives on first floor, [address] at home:  Walker__Canex__RTSx__ BSC__Shower Chair__  02__ HHN__ CPAP__  BiPap__  Hospital Bed__ W/C___ Other_grab bars____    Services in the home and/or outpatient, prior to admission:none, was active w/[place], but they discharged her    Current PCP:Mk Jacobson,     Transportation needs: none       PT/OT recs:none    [address] Notification (HEN):not initiated    Barriers to discharge:none    Plan/comments:Pt plans to d/c home when stable. Pt is a/o, ambulatory, has a PCP and insurance. Pt is currently on 2 L O2, wears 0 at home, currently at 98%. Pt was active w/[facility] [place] [facility] and per pt, they discharged her due to lack of need. CM will continue to follow for possible needs.   Ekaterina Hewitt RN        [facility] on chart for MD signature

## 2022-03-17 NOTE — CONSULTS
[address] ,  [address] [facility], [facility]  Phone: [phone] [facility],  [address], [address] [facility]  Phone: [phone]   DRP:[phone]    Gastroenterology H&P/Consult Note    No chief complaint on file. HPI     Thank you Scarlett Paige MD and Hever Puga DO for asking me to see Mari Rankin in consultation. She is a 78y.o. year old femalewith medical history of TIA, hypertension, carotid artery occlusion and stenosis, NSTEMI, CAD s/p cardiac catheterization with stent placement on 2/11/2022 on Eliquis and Plavix, hyperlipidemia, atrial fibrillation, and CHF presents with complaints of Acute respiratory failure ([facility] [address] [facility] [J96.00]. The documented principal problem is Acute respiratory failure [facility]) and chief complaint is No chief complaint on file. Date of Admission:  3/16/2022  Date of Consultation:  3/17/2022    Patient presented to [facility] emergency department 3/16/2022 with complaints of acute onset shortness of breath. Patient is s/p cardiac catheterization with stent placement on 2/11/2022 following NSTEMI. Patient is anticoagulated on Eliquis and Plavix. Patient was seen and evaluated by GI during previous admission for evaluation of anemia. With no evidence of overt GI bleed patient was instructed to follow-up for elective outpatient EGD and colonoscopy. Patient remains absent from signs of overt GI bleed. Denies abdominal pain, nausea, vomiting, fever, chills, unintentional weight loss, constipation, diarrhea, hematochezia or melenic stools. Labs reviewed including stable hemoglobin 10.5 g/dL hematocrit 33.3%. Last Encounter Reviewed: 2/13/2022  Pertinent PMH, FH, SH is reviewed below.   Last EGD 2012 Dr. Simón Del Cid: multiple erosions and gastric ulcers    Health Maintenance   Topic Date Due    Depression Screen  Never done    DTaP/Tdap/Td vaccine (1 - Tdap) Never done    Shingles Vaccine (1 of pantoprazole  40 mg Oral BID AC    metoprolol succinate  25 mg Oral BID    sertraline  50 mg Oral Daily    [Held by provider] lisinopril  10 mg Oral Daily      sodium chloride       sodium chloride flush, sodium chloride, ondansetron **OR** ondansetron, polyethylene glycol, acetaminophen **OR** acetaminophen  HOME MEDICATIONS  [unfilled]  IMMUNIZATIONS     Immunization History   Administered Date(s) Administered    COVID-19, Pfizer Purple top, DILUTE for use, 12+ yrs, 30mcg/0.3mL dose 01/23/2021, 02/13/2021, 11/06/2021     REVIEW OF SYSTEMS   See HPI for further details and pertinent postiives. Negative for the following:  Constitutional: Negative for weight change. Negative for appetite change and fatigue. HENT: Negative for nosebleeds, sore throat, mouth sores, trouble swallowing and voice change. Respiratory: Negative for cough, choking and chest tightness. Cardiovascular: Negative for chest pain   Gastrointestinal: See HPI  Musculoskeletal: Negative for arthralgias. Skin: Negative for pallor. Neurological: Negative for weakness and light-headedness. Hematological: Negative for adenopathy. Does not bruise/bleed easily. Psychiatric/Behavioral: Negative for suicidal ideas. PHYSICAL EXAM   VITAL SIGNS: /68   Pulse 90   Temp 98 °F (36.7 °C) (Oral)   Resp 18   Ht 5' 1\" (1.549 m)   Wt 129 lb 6.4 oz (58.7 kg)   SpO2 92%   BMI 24.45 kg/m²   With regards to weight, she reports stable / unchanged. Review of available records reveals:   Wt Readings from Last 50 Encounters:   03/16/22 129 lb 6.4 oz (58.7 kg)   03/16/22 130 lb (59 kg)   03/09/22 133 lb (60.3 kg)   03/03/22 134 lb (60.8 kg)   02/15/22 134 lb 11.2 oz (61.1 kg)   02/09/22 130 lb (59 kg)   02/02/22 130 lb (59 kg)   10/06/21 132 lb (59.9 kg)   09/17/21 132 lb (59.9 kg)   08/06/21 125 lb (56.7 kg)   12/12/20 140 lb (63.5 kg)   04/07/16 156 lb (70.8 kg)   07/29/15 156 lb 1.4 oz (70.8 kg)   07/22/15 156 lb 1.4 oz (70.8 kg) 07/15/15 156 lb 1.4 oz (70.8 kg)   05/20/15 156 lb 1.4 oz (70.8 kg)   10/30/14 156 lb 1.4 oz (70.8 kg)   10/23/14 156 lb (70.8 kg)   10/16/14 156 lb 1.4 oz (70.8 kg)   09/16/14 156 lb (70.8 kg)   09/04/14 156 lb (70.8 kg)   02/17/14 163 lb (73.9 kg)   02/23/12 171 lb (77.6 kg)     Constitutional: Well developed, Well nourished, No acute distress, Non-toxic appearance. HENT: Normocephalic, Atraumatic, Bilateral external ears normal, Oropharynx moist, No oral exudates, Nose normal.   Eyes: Conjunctiva normal, No discharge. Neck: Normal range of motion  Cardiovascular: Normal heart rate, Normal rhythm  Thorax & Lungs: Normal breath sounds, No respiratory distress, No wheezing  Abdomen: normal bowel sounds, soft, non tender, non distended  Rectal:  Deferred. Skin: Warm, Dry, No erythema, No rash. No bruising. Lower Extremities: Intact distal pulses, No edema, No tenderness, No cyanosis, No clubbing. Neurologic: Alert & oriented x 3, Normal motor function, Normal sensory function, No focal deficits noted. RADIOLOGY/PROCEDURES       CT ABDOMEN PELVIS W IV CONTRAST Additional Contrast? None    Narrative  EXAMINATION:  CT OF THE ABDOMEN AND PELVIS WITH CONTRAST 2/9/2022 6:12 am    TECHNIQUE:  CT of the abdomen and pelvis was performed with the administration of  intravenous contrast. Multiplanar reformatted images are provided for review. Dose modulation, iterative reconstruction, and/or weight based adjustment of  the mA/kV was utilized to reduce the radiation dose to as low as reasonably  achievable. COMPARISON:  None.     HISTORY:  ORDERING SYSTEM PROVIDED HISTORY: hbg drop  TECHNOLOGIST PROVIDED HISTORY:  Additional Contrast?->None  Reason for exam:->hbg drop  Decision Support Exception - unselect if not a suspected or confirmed  emergency medical condition->Emergency Medical Condition (MA)  Reason for Exam: gi problem    FINDINGS:  Lower Chest: Cardiomegaly, pulmonary vascular congestion and pulmonary edema.  Small hiatal hernia. Organs: The liver is unremarkable. Gallbladder wall calcification is  present. No surrounding inflammatory change. The pancreas, adrenal glands,  kidneys and visualized ureters are unremarkable. Calcified granulomas are  present in the spleen. GI/Bowel: Stomach and duodenal sweep demonstrate no acute abnormality. There  is no evidence of bowel obstruction. No evidence of abnormal bowel wall  thickening or distension. No evidence of appendicitis but the appendix is  not seen. Mild sigmoid diverticulosis. There is moderate prominence at the  anal rectal junction region, incompletely imaged. Pelvis: The bladder is unremarkable. Status post hysterectomy. Peritoneum/Retroperitoneum: No evidence of ascites or free air. No evidence  of lymphadenopathy. Aorta is normal in caliber. Bones/Soft Tissues: No acute bone or soft tissue abnormality. Small fat  containing umbilical hernia with no associated inflammatory change. Impression  Congestive heart failure. Small hiatal hernia. Porcelain gallbladder. Evidence of old granulomatous disease. Mild sigmoid diverticulosis. Soft tissue prominence incompletely imaged at the anal rectal junction  region. Correlate clinically.     COURSE & MEDICAL DECISION MAKING   (See epic chart for additional details including stool tests, total bilirubin, viral loads, procedures, and pathology)  Lab Results   Component Value Date    WBC 20.9 (H) 03/16/2022    HGB 10.5 (L) 03/16/2022    HCT 33.3 (L) 03/16/2022     (H) 03/16/2022    CHOL 92 02/11/2022    TRIG 131 02/11/2022    HDL 34 (L) 02/11/2022    ALT 19 03/16/2022    AST 12 (L) 03/16/2022     03/17/2022    K 5.4 (H) 03/17/2022    CL 99 03/17/2022    CREATININE 0.6 03/17/2022    BUN 16 03/17/2022    CO2 24 03/17/2022    TSH 2.55 04/17/2021    INR 2.35 (H) 03/17/2022    GLUF 87 04/17/2021    LABA1C 5.3 04/17/2021    LABMICR YES 02/09/2022     Lab Results Component Value Date    LABALBU 4.1 03/16/2022    ALKPHOS 198 03/16/2022    ALT 19 03/16/2022    AST 12 03/16/2022    BILITOT 1.1 03/16/2022     Lab Results   Component Value Date    LIPASE 16.0 02/09/2022     No results found for: AMYLASE  Lab Results   Component Value Date    INR 2.35 (H) 03/17/2022    INR 1.28 (H) 03/07/2022    INR 1.02 01/22/2014    PROTIME 27.5 (H) 03/17/2022    PROTIME 14.6 (H) 03/07/2022    PROTIME 11.4 01/22/2014     Lab Results   Component Value Date    IRON 73 02/10/2022    TIBC 255 (L) 02/10/2022    FERRITIN 359.2 (H) 02/10/2022     Lab Results   Component Value Date    SEDRATE 19 02/23/2012      No results found for: CRP  Lab Results   Component Value Date    TJLCJOPU68 707 02/10/2022    [id number] 775 02/09/2022     Lab Results   Component Value Date    FOLATE 14.97 02/09/2022     . FINAL IMPRESSION/ASSESSMENT/PLAN       1. Anemia with history of gastric erosions and ulceration on EGD    -Continue Protonix 40 mg daily  -Given no evidence of overt GI bleed patient is to follow up with GI outpatient for elective EGD/colonoscopy  -Patient will need cardiac clearance to hold anticoagulation prior to EGD/coolonoscopy  -GI to sign off on patient. Recall for evidence of overt GI bleeding. 1.  The patient indicates understanding of these issues and agrees with the plan. 2.  I reviewed the patient's medical information and medical history. 3.  I have reviewed the past medical, family, and social history sections including the medications and allergies listed in the above medical record. Thank you for involving [facility]) Gastroenterology in [facility] care. For further questions or concerns, we can best be reached through perfect serv.         YONY Cavazos CNP 3/17/22 12:14 PM EDT    [facility]   Phone [phone]   Fax [phone]

## 2022-03-17 NOTE — PROGRESS NOTES
Consult request received  Upon chart review, patient seems to belong to [facility].   So please consult them instead and I will sign off  Thanks    Ynes Brannon MD       [phone]

## 2022-03-17 NOTE — ACP (ADVANCE CARE PLANNING)
Advance Care Planning     General Advance Care Planning (ACP) Conversation    Date of Conversation: 3/16/2022  Conducted with: Patient with Decision Making Capacity    Healthcare Decision Maker:    Primary Decision Maker: Lani Lainey - Child - [phone]    Secondary Decision Maker: Maureen Bentley - Child - [phone]  Click here to complete Healthcare Decision Makers including selection of the Healthcare Decision Maker Relationship (ie \"Primary\"). Today we documented Decision Maker(s) consistent with Legal Next of Kin hierarchy. Content/Action Overview:   Has ACP document(s) on file - reflects the patient's care preferences  Reviewed DNR/DNI and patient elects Full Code (Attempt Resuscitation)        Length of Voluntary ACP Conversation in minutes:  <16 minutes (Non-Billable)    Cristina Medina RN

## 2022-03-17 NOTE — PROGRESS NOTES
Hospitalist Progress Note      PCP: Estee Ng DO    Date of Admission: 3/16/2022    Chief Complaint: sob    Hospital Course:   78 y.o. female with hx of MDS, cad/cardiomyopathy, HTN, HLD who presented to [address] [place] [address] with acute onset of SOB/acute resp failure due to suspected acute on chronic systolic HF. Cars consulted. Subjective:  Doing well, remains on 2L, no sob currently       Medications:  Reviewed    Infusion Medications    sodium chloride       Scheduled Medications    sodium chloride flush  5-40 mL IntraVENous 2 times per day    allopurinol  300 mg Oral Daily    apixaban  5 mg Oral BID    clopidogrel  75 mg Oral Daily    atorvastatin  80 mg Oral Nightly    pantoprazole  40 mg Oral BID AC    metoprolol succinate  25 mg Oral BID    sertraline  50 mg Oral Daily    lisinopril  10 mg Oral Daily     PRN Meds: sodium chloride flush, sodium chloride, ondansetron **OR** ondansetron, polyethylene glycol, acetaminophen **OR** acetaminophen      Intake/Output Summary (Last 24 hours) at 3/17/2022 1107  Last data filed at 3/17/2022 0636  Gross per 24 hour   Intake 1249.56 ml   Output 900 ml   Net 349.56 ml       Physical Exam Performed:    /68   Pulse 90   Temp 98 °F (36.7 °C) (Oral)   Resp 18   Ht 5' 1\" (1.549 m)   Wt 129 lb 6.4 oz (58.7 kg)   SpO2 92%   BMI 24.45 kg/m²       General appearance:  No apparent distress, appears stated age and cooperative. HEENT:  Normal cephalic, atraumatic without obvious deformity. Pupils equal, round, and reactive to light. Extra ocular muscles intact. Conjunctivae/corneas clear. Neck: Supple, with full range of motion. No jugular venous distention. Trachea midline. Respiratory:  Normal respiratory effort. Clear to auscultation, bilaterally without Wheezes/Rhonchi. Mild rales at bases  Cardiovascular:  Regular rate and rhythm with normal S1/S2 without murmurs, rubs or gallops.   Abdomen: Soft, non-tender, non-distended with normal bowel sounds. Musculoskeletal:  No clubbing, cyanosis or edema bilaterally. Full range of motion without deformity. Skin: Skin color, texture, turgor normal.  No rashes or lesions. Neurologic:  Neurovascularly intact without any focal sensory/motor deficits. Cranial nerves: II-XII intact, grossly non-focal.  Psychiatric:  Alert and oriented, thought content appropriate, normal insight  Capillary Refill: Brisk,3 seconds, normal  Peripheral Pulses: +2 palpable, equal bilaterally          Labs:   Recent Labs     03/16/22  0630 03/16/22  1541   WBC 27.1* 20.9*   HGB 12.0 10.5*   HCT 38.1 33.3*   * 511*     Recent Labs     03/16/22  0630 03/17/22  0746    136   K 4.6 5.4*    99   CO2 25 24   BUN 12 16   CREATININE 0.6 0.6   CALCIUM 9.0 8.5     Recent Labs     03/16/22  0630   AST 12*   ALT 19   BILITOT 1.1*   ALKPHOS 198*     Recent Labs     03/17/22  0746   INR 2.35*     Recent Labs     03/16/22  0630 03/16/22  1541 03/16/22  2118   TROPONINI <0.01 <0.01 0.02*       Urinalysis:      Lab Results   Component Value Date    NITRU POSITIVE 02/09/2022    WBCUA 10-20 02/09/2022    BACTERIA 1+ 02/09/2022    RBCUA 0-2 02/09/2022    BLOODU TRACE-INTACT 02/09/2022    SPECGRAV 1.015 02/09/2022    GLUCOSEU Negative 02/09/2022    GLUCOSEU Negative 03/08/2012       Radiology:  No orders to display           Assessment/Plan:    Active Hospital Problems    Diagnosis     Acute respiratory failure ([facility] [address] [J96.00]        acute resp failure- not on oxygen at home, noted 88% on ra on arrival to ER, improved here after getting iv lasix  -Tele  -Trended tha were unremarkable     Lactic acidosis/leukocytosis-  Unclear etiology, possibly reactive, no obvious source of infection noted  -Trialled gentle ivfs  -trended labs   -procal was slightly elevated, ?related to above  -hemonc consulted, ? MDS related    Hyperkalemia- mild, noted 5.4 on 3/17  -held acei    Cardiomyopathy- Echo 2/2022(EF  45%, d2 dd, mod mr(?torn cord on anterior mitral leaflet), mod tr  -on bb/acei     Afib- rate controlled  On eliquis, bb     HTN- unclear if controlled or not at home  -on acei/bb     HLD-on statin     MDS- unclear if related to wbc  -mgmtt as outpt     CAD- with recent nstemi/stent at [facility]  -continued statin/plavix  -no longer on asa     Gout- continued allopurinol     Recent gastric ulcer/porcelain gallbladder- sees dr. Osorio Lopes  -supposed to have outpt EGD, consult GI here per family request     DVT Prophylaxis: on home eliquis  Diet: ADULT DIET;  Regular; 2000 ml  Code Status: Full Code      PT/OT Eval Status: not ordered     Dispo - pending workup, GI consulted, hemonc consulted    Farshad Demaroc MD

## 2022-03-18 ENCOUNTER — TELEPHONE (OUTPATIENT)
Dept: CARDIOLOGY | Age: 82
End: 2022-03-18

## 2022-03-18 ENCOUNTER — APPOINTMENT (OUTPATIENT)
Dept: GENERAL RADIOLOGY | Age: 82
DRG: 291 | End: 2022-03-18
Attending: INTERNAL MEDICINE
Payer: MEDICARE

## 2022-03-18 LAB
ANION GAP SERPL CALCULATED.3IONS-SCNC: 13 MMOL/L (ref 3–16)
ANISOCYTOSIS: ABNORMAL
ANISOCYTOSIS: ABNORMAL
ATYPICAL LYMPHOCYTE RELATIVE PERCENT: 6 % (ref 0–6)
BANDED NEUTROPHILS RELATIVE PERCENT: 11 % (ref 0–7)
BANDED NEUTROPHILS RELATIVE PERCENT: 8 % (ref 0–7)
BASOPHILS ABSOLUTE: 0 K/UL (ref 0–0.2)
BASOPHILS ABSOLUTE: 0.2 K/UL (ref 0–0.2)
BASOPHILS RELATIVE PERCENT: 0 %
BASOPHILS RELATIVE PERCENT: 1 %
BUN BLDV-MCNC: 12 MG/DL (ref 7–20)
CALCIUM SERPL-MCNC: 8.8 MG/DL (ref 8.3–10.6)
CHLORIDE BLD-SCNC: 100 MMOL/L (ref 99–110)
CO2: 25 MMOL/L (ref 21–32)
CREAT SERPL-MCNC: <0.5 MG/DL (ref 0.6–1.2)
EOSINOPHILS ABSOLUTE: 0 K/UL (ref 0–0.6)
EOSINOPHILS ABSOLUTE: 0.4 K/UL (ref 0–0.6)
EOSINOPHILS RELATIVE PERCENT: 0 %
EOSINOPHILS RELATIVE PERCENT: 2 %
GFR AFRICAN AMERICAN: >60
GFR NON-AFRICAN AMERICAN: >60
GLUCOSE BLD-MCNC: 109 MG/DL (ref 70–99)
HCT VFR BLD CALC: 32.5 % (ref 36–48)
HCT VFR BLD CALC: 33.7 % (ref 36–48)
HEMOGLOBIN: 10.3 G/DL (ref 12–16)
HEMOGLOBIN: 10.7 G/DL (ref 12–16)
LACTIC ACID: 3.2 MMOL/L (ref 0.4–2)
LACTIC ACID: 3.7 MMOL/L (ref 0.4–2)
LACTIC ACID: 3.7 MMOL/L (ref 0.4–2)
LACTIC ACID: 4.2 MMOL/L (ref 0.4–2)
LYMPHOCYTES ABSOLUTE: 2.1 K/UL (ref 1–5.1)
LYMPHOCYTES ABSOLUTE: 3.3 K/UL (ref 1–5.1)
LYMPHOCYTES RELATIVE PERCENT: 10 %
LYMPHOCYTES RELATIVE PERCENT: 11 %
MACROCYTES: ABNORMAL
MACROCYTES: ABNORMAL
MAGNESIUM: 1.6 MG/DL (ref 1.8–2.4)
MCH RBC QN AUTO: 31.8 PG (ref 26–34)
MCH RBC QN AUTO: 32.3 PG (ref 26–34)
MCHC RBC AUTO-ENTMCNC: 31.6 G/DL (ref 31–36)
MCHC RBC AUTO-ENTMCNC: 31.6 G/DL (ref 31–36)
MCV RBC AUTO: 100.9 FL (ref 80–100)
MCV RBC AUTO: 102 FL (ref 80–100)
METAMYELOCYTES RELATIVE PERCENT: 2 %
MICROCYTES: ABNORMAL
MICROCYTES: ABNORMAL
MONOCYTES ABSOLUTE: 1.2 K/UL (ref 0–1.3)
MONOCYTES ABSOLUTE: 1.4 K/UL (ref 0–1.3)
MONOCYTES RELATIVE PERCENT: 6 %
MONOCYTES RELATIVE PERCENT: 7 %
MYELOCYTE PERCENT: 1 %
NEUTROPHILS ABSOLUTE: 14.4 K/UL (ref 1.7–7.7)
NEUTROPHILS ABSOLUTE: 17 K/UL (ref 1.7–7.7)
NEUTROPHILS RELATIVE PERCENT: 63 %
NEUTROPHILS RELATIVE PERCENT: 72 %
OVALOCYTES: ABNORMAL
PDW BLD-RTO: 27.1 % (ref 12.4–15.4)
PDW BLD-RTO: 27.4 % (ref 12.4–15.4)
PLATELET # BLD: 458 K/UL (ref 135–450)
PLATELET # BLD: 483 K/UL (ref 135–450)
PLATELET SLIDE REVIEW: ADEQUATE
PLATELET SLIDE REVIEW: ADEQUATE
PMV BLD AUTO: 8.3 FL (ref 5–10.5)
PMV BLD AUTO: 8.5 FL (ref 5–10.5)
POIKILOCYTES: ABNORMAL
POLYCHROMASIA: ABNORMAL
POTASSIUM SERPL-SCNC: 4.3 MMOL/L (ref 3.5–5.1)
RBC # BLD: 3.23 M/UL (ref 4–5.2)
RBC # BLD: 3.31 M/UL (ref 4–5.2)
SCHISTOCYTES: ABNORMAL
SLIDE REVIEW: ABNORMAL
SLIDE REVIEW: ABNORMAL
SODIUM BLD-SCNC: 138 MMOL/L (ref 136–145)
SPHEROCYTES: ABNORMAL
STOMATOCYTES: ABNORMAL
TARGET CELLS: ABNORMAL
WBC # BLD: 19.5 K/UL (ref 4–11)
WBC # BLD: 20.5 K/UL (ref 4–11)

## 2022-03-18 PROCEDURE — 83735 ASSAY OF MAGNESIUM: CPT

## 2022-03-18 PROCEDURE — 2060000000 HC ICU INTERMEDIATE R&B

## 2022-03-18 PROCEDURE — 80048 BASIC METABOLIC PNL TOTAL CA: CPT

## 2022-03-18 PROCEDURE — 6370000000 HC RX 637 (ALT 250 FOR IP): Performed by: NURSE PRACTITIONER

## 2022-03-18 PROCEDURE — 85025 COMPLETE CBC W/AUTO DIFF WBC: CPT

## 2022-03-18 PROCEDURE — 94761 N-INVAS EAR/PLS OXIMETRY MLT: CPT

## 2022-03-18 PROCEDURE — 97116 GAIT TRAINING THERAPY: CPT

## 2022-03-18 PROCEDURE — 97110 THERAPEUTIC EXERCISES: CPT

## 2022-03-18 PROCEDURE — 83605 ASSAY OF LACTIC ACID: CPT

## 2022-03-18 PROCEDURE — 6370000000 HC RX 637 (ALT 250 FOR IP): Performed by: INTERNAL MEDICINE

## 2022-03-18 PROCEDURE — 97535 SELF CARE MNGMENT TRAINING: CPT

## 2022-03-18 PROCEDURE — 97530 THERAPEUTIC ACTIVITIES: CPT

## 2022-03-18 PROCEDURE — 99233 SBSQ HOSP IP/OBS HIGH 50: CPT | Performed by: INTERNAL MEDICINE

## 2022-03-18 PROCEDURE — 2700000000 HC OXYGEN THERAPY PER DAY

## 2022-03-18 PROCEDURE — 6360000002 HC RX W HCPCS: Performed by: INTERNAL MEDICINE

## 2022-03-18 PROCEDURE — 97161 PT EVAL LOW COMPLEX 20 MIN: CPT

## 2022-03-18 PROCEDURE — 97166 OT EVAL MOD COMPLEX 45 MIN: CPT

## 2022-03-18 PROCEDURE — 36415 COLL VENOUS BLD VENIPUNCTURE: CPT

## 2022-03-18 PROCEDURE — 71046 X-RAY EXAM CHEST 2 VIEWS: CPT

## 2022-03-18 PROCEDURE — 2580000003 HC RX 258: Performed by: INTERNAL MEDICINE

## 2022-03-18 RX ORDER — FUROSEMIDE 20 MG/1
20 TABLET ORAL ONCE
Status: COMPLETED | OUTPATIENT
Start: 2022-03-18 | End: 2022-03-18

## 2022-03-18 RX ORDER — MAGNESIUM SULFATE IN WATER 40 MG/ML
4000 INJECTION, SOLUTION INTRAVENOUS ONCE
Status: COMPLETED | OUTPATIENT
Start: 2022-03-18 | End: 2022-03-18

## 2022-03-18 RX ORDER — LISINOPRIL 10 MG/1
10 TABLET ORAL DAILY
Status: DISCONTINUED | OUTPATIENT
Start: 2022-03-18 | End: 2022-03-19 | Stop reason: HOSPADM

## 2022-03-18 RX ORDER — FUROSEMIDE 40 MG/1
40 TABLET ORAL DAILY
Status: DISCONTINUED | OUTPATIENT
Start: 2022-03-19 | End: 2022-03-19 | Stop reason: HOSPADM

## 2022-03-18 RX ADMIN — LISINOPRIL 10 MG: 10 TABLET ORAL at 14:22

## 2022-03-18 RX ADMIN — FUROSEMIDE 20 MG: 20 TABLET ORAL at 14:25

## 2022-03-18 RX ADMIN — CLOPIDOGREL BISULFATE 75 MG: 75 TABLET ORAL at 09:48

## 2022-03-18 RX ADMIN — Medication 10 ML: at 20:43

## 2022-03-18 RX ADMIN — SERTRALINE HYDROCHLORIDE 50 MG: 50 TABLET ORAL at 09:48

## 2022-03-18 RX ADMIN — METOPROLOL SUCCINATE 25 MG: 25 TABLET, EXTENDED RELEASE ORAL at 09:48

## 2022-03-18 RX ADMIN — ALLOPURINOL 300 MG: 300 TABLET ORAL at 09:48

## 2022-03-18 RX ADMIN — Medication 10 ML: at 09:49

## 2022-03-18 RX ADMIN — MAGNESIUM SULFATE HEPTAHYDRATE 4000 MG: 40 INJECTION, SOLUTION INTRAVENOUS at 16:30

## 2022-03-18 RX ADMIN — ATORVASTATIN CALCIUM 80 MG: 80 TABLET, FILM COATED ORAL at 20:44

## 2022-03-18 RX ADMIN — APIXABAN 5 MG: 5 TABLET, FILM COATED ORAL at 09:48

## 2022-03-18 RX ADMIN — FUROSEMIDE 20 MG: 20 TABLET ORAL at 09:48

## 2022-03-18 RX ADMIN — METOPROLOL SUCCINATE 25 MG: 25 TABLET, EXTENDED RELEASE ORAL at 20:43

## 2022-03-18 RX ADMIN — PANTOPRAZOLE SODIUM 40 MG: 40 TABLET, DELAYED RELEASE ORAL at 06:21

## 2022-03-18 RX ADMIN — PANTOPRAZOLE SODIUM 40 MG: 40 TABLET, DELAYED RELEASE ORAL at 16:32

## 2022-03-18 RX ADMIN — APIXABAN 5 MG: 5 TABLET, FILM COATED ORAL at 20:43

## 2022-03-18 ASSESSMENT — PAIN SCALES - WONG BAKER
WONGBAKER_NUMERICALRESPONSE: 0

## 2022-03-18 ASSESSMENT — PAIN DESCRIPTION - PROGRESSION
CLINICAL_PROGRESSION: GRADUALLY IMPROVING

## 2022-03-18 ASSESSMENT — PAIN SCALES - GENERAL
PAINLEVEL_OUTOF10: 0
PAINLEVEL_OUTOF10: 0

## 2022-03-18 NOTE — PROGRESS NOTES
Occupational Therapy   Occupational Therapy Initial Assessment and Treatment Note 1x  Date: 3/18/2022   Patient Name: Ford Maxwell  MRN: [id number]     : 1940    Date of Service: 3/18/2022    Discharge Recommendations:  Home with assist PRN     Assessment   Assessment: Pt seen for OT eval and tx after admission for acute respiratory failure. Pt had hx NSTEMI 22. She lives independently at home and does not use a device inside her home. Currently, pt is independent with standing balance during ADLs and transfers. She can reach UE/LE for ADLs. O2 sats were 95% on 1LO2. No further OT is needed. Decision Making: Medium Complexity  OT Education: OT Role;Plan of Care;Energy Conservation; ADL Adaptive Strategies  Disease Specific Education: Pt educated on importance of OOB mobility, prevention of complications of bedrest, and general safety during hospitalization. Pt verbalized understanding  Activity Tolerance  Activity Tolerance: Patient Tolerated treatment well  Activity Tolerance: 1LO2, O2 sats 95% during activity. /69  Safety Devices  Safety Devices in place: Yes  Type of devices: Nurse notified;Call light within reach; Left in chair;Gait belt; Chair alarm in place         Patient Diagnosis(es): There were no encounter diagnoses. has a past medical history of Allergic rhinitis, CAD S/P percutaneous coronary angioplasty, Cancer ([address] GI bleed, Glaucoma, Hyperlipidemia, Hypertension, Myelodysplastic syndrome ([address] Radiation therapy complication, and UTI (lower urinary tract infection). has a past surgical history that includes Cataract removal with implant; Breast surgery (); Hysterectomy; knee surgery (2012); and CT BIOPSY BONE MARROW (3/7/2022).        Restrictions  Restrictions/Precautions  Restrictions/Precautions: Fall Risk  Position Activity Restriction  Other position/activity restrictions: 2.5L    Subjective   General  Chart Reviewed: Yes  Patient assessed for rehabilitation services?: Yes  Family / Caregiver Present: No  Referring Practitioner: MALKA Crow  Diagnosis: acute respiratory failure  Subjective  Subjective: Pt agreeable to OT  General Comment  Comments: RN approved therapy  Patient Currently in Pain: Denies  Vital Signs  Pulse: 85  Heart Rate Source: Monitor  BP: 120/69  BP Location: Left upper arm  MAP (mmHg): 86  Patient Position: Semi fowlers  Level of Consciousness: Alert (0)  Patient Currently in Pain: Denies  Oxygen Therapy  SpO2: 94 %  Pulse Oximeter Device Mode: Intermittent  Pulse Oximeter Device Location: Finger  O2 Device: Nasal cannula  Social/Functional History  Social/Functional History  Lives With: Alone  Type of Home: House  Home Layout: Two level (full bathroom and bedroom on first floor)  Home Access: Stairs to enter without rails  Entrance Stairs - Number of Steps: 2+1 (uses cane on steps)  Bathroom Shower/Tub: Walk-in shower  Bathroom Toilet: Handicap height  Bathroom Equipment: Grab bars in shower,Grab bars around toilet  Home Equipment: Cane  ADL Assistance: Independent  Homemaking Assistance: Independent  Ambulation Assistance: Independent  Transfer Assistance: Independent  Active : No  Patient's  Info: Children assist with transportation. Occupation: Retired  Type of occupation:   Additional Comments: No falls     Objective   Vision: Impaired  Hearing: Exceptions to [facility]  Hearing Exceptions: Hard of hearing/hearing concerns; No hearing aid    Orientation  Overall Orientation Status: Within Functional Limits  Observation/Palpation  Posture: Fair  Balance  Sitting Balance: Independent  Standing Balance: Independent  Functional Mobility  Functional - Mobility Device: No device  Assist Level: Independent  Functional Mobility Comments: Pt has been taking herself to BR.   Toilet Transfers  Toilet - Technique: Ambulating  Toilet Transfer: Independent  ADL  Feeding: Independent  Grooming: Independent  UE Dressing: Independent  LE Dressing: Independent  Toileting: Independent  Tone RUE  RUE Tone: Normotonic  Tone LUE  LUE Tone: Normotonic  Coordination  Movements Are Fluid And Coordinated: Yes     Bed mobility  Supine to Sit: Modified independent  Sit to Supine: Modified independent  Transfers  Stand Pivot Transfers: Independent  Sit to stand: Independent  Stand to sit: Independent     Cognition  Overall Cognitive Status: WFL        Sensation  Overall Sensation Status:  (hx of 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)      LUE AROM (degrees)  LUE AROM : WFL  LUE General AROM: hx L shoulder arthritis  RUE AROM (degrees)  RUE AROM : WFL  LUE Strength  Gross LUE Strength: WFL  RUE Strength  Gross RUE Strength: WFL     AM-PAC Score   AM-PAC Inpatient Daily Activity Raw Score: 24 (03/18/22 1554)  AM-PAC Inpatient ADL T-Scale Score : 57.54 (03/18/22 1554)  ADL Inpatient CMS 0-100% Score: 0 (03/18/22 1554)  ADL Inpatient CMS G-Code Modifier : [facility] (03/18/22 1554)    Goals  Short term goals  Time Frame for Short term goals: 1x  Short term goal 1: Perform functional transfers independently--goal met 3/18  Short term goal 2: Perform UE/LE ADLs independently--goal met 3/18  Patient Goals   Patient goals : \"go home tomorrow\"     Therapy Time   Individual Concurrent Group Co-treatment   Time In 1503         Time Out 1536         Minutes 33         Timed Code Treatment Minutes: 23 Minutes (10 min eval)    If pt is discharged prior to next OT session, this note will serve as the discharge summary.   Steve Berry OT

## 2022-03-18 NOTE — CARE COORDINATION
IPTA;  CM will continue to follow for possible needs. Pt on 2 L O2, wears 0 at home. Pt has been active w/[facility] in the past should [address] be needed.   Emilia Valdez RN

## 2022-03-18 NOTE — PLAN OF CARE
Problem: Skin Integrity:  Goal: Will show no infection signs and symptoms  Description: Will show no infection signs and symptoms  Outcome: Ongoing  Note: Pt at risk for impaired skin integrity due to bed rest and pt's impaired mobility. Skin will be assessed every shift. Pt will be turned every 2 hours as needed to prevent potential pressure injuries. Pt has no evidence of pressure injuries and no further needs at this bernadette. Problem: Safety:  Goal: Free from accidental physical injury  Description: Free from accidental physical injury  Outcome: Ongoing  Note: Pt at risk for accidental injury. Pt will remain free from injury during admission. Pt educated on the use of the call light and the need to have an active bed alarm. Pt will call out prior to ambulation. Pt's pathway will remain free from tripping hazards. Pt's bed is in lowest position, call light in reach, and active and audible bed alarm. Pt has no further needs at this time. Will continue to monitor. Problem: HEMODYNAMIC STATUS  Goal: Patient has stable vital signs and fluid balance  Outcome: Ongoing  Note:   Patient's EF (Ejection Fraction) is greater than 40%    Heart Failure Medications:  Diuretics[de-identified] Furosemide    (One of the following REQUIRED for EF </= 40%/SYSTOLIC FAILURE but MAY be used in EF% >40%/DIASTOLIC FAILURE)        ACE[de-identified] None        ARB[de-identified] None         ARNI[de-identified] None    (Beta Blockers)  NON- Evidenced Based Beta Blocker (for EF% >40%/DIASTOLIC FAILURE): None    Evidenced Based Beta Blocker::(REQUIRED for EF% <40%/SYSTOLIC FAILURE) Metoprolol SUCCinate- Toprol XL  . .................................................................................................................................................. Patient's weights and intake/output reviewed: Yes    Patient's Last Weight: 131 lbs obtained by standing scale. Difference of 2 lbs more than last documented weight.       Intake/Output Summary (Last 24 hours) at 3/18/2022 0629  Last data filed at 3/18/2022 6966  Gross per 24 hour   Intake 300 ml   Output 500 ml   Net -200 ml       Comorbidities Reviewed Yes    Patient has a past medical history of Allergic rhinitis, CAD S/P percutaneous coronary angioplasty, Cancer ([facility] [address] GI bleed, Glaucoma, Hyperlipidemia, Hypertension, Myelodysplastic syndrome ([facility] [address] Radiation therapy complication, and UTI (lower urinary tract infection). >>For CHF and Comorbidity documentation on Education Time and Topics, please see Education Tab    Progressive Mobility Assessment:  What is this patient's Current Level of Mobility?: Ambulatory-Up Ad Michelle  How was this patient Mobilized today?: Edge of Bed, Up to Chair, Bedside Commode,  Up to Toilet/Shower, and Up in Room, ambulated 5 ft                 With Whom? Self                 Level of Difficulty/Assistance:  Standby assist      Pt resting in bed at this time on  2 L O2. Pt with complaints of shortness of breath. Pt without lower extremity edema.      Patient and/or Family's stated Goal of Care this Admission: reduce shortness of breath, increase activity tolerance, better understand heart failure and disease management, be more comfortable, and reduce lower extremity edema prior to discharge        :

## 2022-03-18 NOTE — PROGRESS NOTES
Hospitalist Progress Note      PCP: Estee Ng DO    Date of Admission: 3/16/2022      Chief Complaint: [address] Course:   78 y. o. female with hx of MDS, cad/cardiomyopathy, HTN, HLD who presented to [facility] with acute onset of SOB/acute resp failure due to suspected acute on chronic systolic HF. Cars consulted.       Subjective: resting in bed, on 2L       Medications:  Reviewed    Infusion Medications    sodium chloride       Scheduled Medications    furosemide  20 mg Oral Daily    sodium chloride flush  5-40 mL IntraVENous 2 times per day    allopurinol  300 mg Oral Daily    apixaban  5 mg Oral BID    clopidogrel  75 mg Oral Daily    atorvastatin  80 mg Oral Nightly    pantoprazole  40 mg Oral BID AC    metoprolol succinate  25 mg Oral BID    sertraline  50 mg Oral Daily    [Held by provider] lisinopril  10 mg Oral Daily     PRN Meds: sodium chloride flush, sodium chloride, ondansetron **OR** ondansetron, polyethylene glycol, acetaminophen **OR** acetaminophen      Intake/Output Summary (Last 24 hours) at 3/18/2022 0905  Last data filed at 3/18/2022 0622  Gross per 24 hour   Intake 240 ml   Output 500 ml   Net -260 ml       Physical Exam Performed:    /77   Pulse 85   Temp 98.3 °F (36.8 °C) (Oral)   Resp 18   Ht 5' 1\" (1.549 m)   Wt 131 lb 11.2 oz (59.7 kg)   SpO2 95%   BMI 24.88 kg/m²       General appearance:  No apparent distress, appears stated age and cooperative. HEENT:  Normal cephalic, atraumatic without obvious deformity. Pupils equal, round, and reactive to light.  Extra ocular muscles intact. Conjunctivae/corneas clear. Neck: Supple, with full range of motion. No jugular venous distention. Trachea midline. Respiratory:  Normal respiratory effort. Clear to auscultation, bilaterally without Wheezes/Rhonchi. Mild rales at bases  Cardiovascular:  Regular rate and rhythm with normal S1/S2 without murmurs, rubs or gallops.   Abdomen: Soft, non-tender, non-distended with normal bowel sounds. Musculoskeletal:  No clubbing, cyanosis or edema bilaterally.  Full range of motion without deformity. Skin: Skin color, texture, turgor normal.  No rashes or lesions. Neurologic:  Neurovascularly intact without any focal sensory/motor deficits.  Cranial nerves: II-XII intact, grossly non-focal.  Psychiatric:  Alert and oriented, thought content appropriate, normal insight  Capillary Refill: Brisk,3 seconds, normal  Peripheral Pulses: +2 palpable, equal bilaterally        Labs:   Recent Labs     03/16/22  1541 03/17/22  1902 03/18/22  0713   WBC 20.9* 18.2* 20.5*   HGB 10.5* 9.4* 10.7*   HCT 33.3* 29.7* 33.7*   * 466* 483*     Recent Labs     03/16/22  0630 03/17/22  0746 03/18/22  0713    136 138   K 4.6 5.4* 4.3    99 100   CO2 25 24 25   BUN 12 16 12   CREATININE 0.6 0.6 <0.5*   CALCIUM 9.0 8.5 8.8     Recent Labs     03/16/22  0630   AST 12*   ALT 19   BILITOT 1.1*   ALKPHOS 198*     Recent Labs     03/17/22  0746   INR 2.35*     Recent Labs     03/16/22  0630 03/16/22  1541 03/16/22  2118   TROPONINI <0.01 <0.01 0.02*       Urinalysis:      Lab Results   Component Value Date    NITRU POSITIVE 02/09/2022    WBCUA 10-20 02/09/2022    BACTERIA 1+ 02/09/2022    RBCUA 0-2 02/09/2022    BLOODU TRACE-INTACT 02/09/2022    SPECGRAV 1.015 02/09/2022    GLUCOSEU Negative 02/09/2022    GLUCOSEU Negative 03/08/2012       Radiology:  XR CHEST (2 VW)    (Results Pending)           Assessment/Plan:    Active Hospital Problems    Diagnosis     Acute respiratory failure ([facility] [address] [J96.00]     Anemia [D64.9]            acute resp failure- not on oxygen at home, noted 88% on ra on arrival to ER, improved here after getting iv lasix  -Tele  -Trended tha were unremarkable   -on lasix  -needed supp oxygen    Lactic acidosis/leukocytosis/bandemia-  Unclear etiology, possibly reactive, no obvious source of infection noted  -Trialled gentle ivfs  -trended labs   -procal was slightly elevated, ?related to above  -hemonc consulted, felt to be primary myelofibrosis is the cause,  plan for ruxolitinib as outpt     Hyperkalemia- resolved, mild, noted 5.4 on 3/17  -held acei     Cardiomyopathy- Echo 2/2022(EF  45%, d2 dd, mod mr(?torn cord on anterior mitral leaflet), mod tr  -on bb/acei   -cards consulted    Afib- rate controlled  On eliquis, bb     HTN- unclear if controlled or not at home  -on acei/bb     HLD-on statin     MDS- unclear if related to wbc  -mgmt as outpt     CAD- with recent nstemi/stent at [facility]  -continued statin/plavix  -no longer on asa     Gout- continued allopurinol     Recent gastric ulcer/porcelain gallbladder- sees dr. Camila Schmitz  -supposed to have outpt EGD, consulted GI here per family request, plan for outpt EGD/cscope per note(would like to hold plavix/eliquis at that time)     DVT Prophylaxis: on home eliquis  Diet: ADULT DIET; Regular;  No Added Salt (3-4 gm); 2000 ml  Code Status: Full Code    PT/OT Eval Status: ordered 3/18    Dispo - pending carsd recs, ongoing idursis, check cxr, wean oxygen , ?this weekend    Carla Glynn MD

## 2022-03-18 NOTE — TELEPHONE ENCOUNTER
Please contact patient to arrange  for close follow-up with Jorden Crowder in one week. Hospital follow-up for CHF with Dr. Jackson Esteban. Patient will probably be discharged from [facility] this weekend. Thank You.

## 2022-03-18 NOTE — CONSULTS
ONCOLOGY HEMATOLOGY CARE CONSULT NOTE      Requesting Physician:  Dr. Olivier Lambert:  Abnormal CBC        HISTORY OF PRESENT ILLNESS:      Ms. Yoanna Fregoso  is a 78 y.o. female we are seeing in consultation for an abnormal CBC. This patient is cared for by myself. She was admitted to [facility] on 3/16/2022 for decompensated heart failure. I am being consulted due to an abnormal CBC.     Past Medical History:    Past Medical History:   Diagnosis Date    Allergic rhinitis     CAD S/P percutaneous coronary angioplasty 02/11/2022    Diagonal 99% stented 2.5 x 18 mm Denis CECIL    Cancer (HCC)     breast    GI bleed     Upper GI Bleed with knee surgery 2012    Glaucoma     bilateral    Hyperlipidemia     Hypertension     Myelodysplastic syndrome ([address]     Radiation therapy complication 7214    UTI (lower urinary tract infection)      Past Surgical History:    Past Surgical History:   Procedure Laterality Date    BREAST SURGERY  1999    lumpectomy right    CATARACT REMOVAL WITH IMPLANT      bilateral    CT BONE MARROW BIOPSY  3/7/2022    CT BONE MARROW BIOPSY 3/7/2022 [facility] CT SCAN    HYSTERECTOMY      age 34    KNEE SURGERY  2/6/2012    left side       Current Medications:    Current Facility-Administered Medications   Medication Dose Route Frequency Provider Last Rate Last Admin    furosemide (LASIX) tablet 20 mg  20 mg Oral Daily YONY Sharp - CNP   20 mg at 03/17/22 1203    sodium chloride flush 0.9 % injection 5-40 mL  5-40 mL IntraVENous 2 times per day Ilene Brooks MD   10 mL at 03/17/22 2114    sodium chloride flush 0.9 % injection 5-40 mL  5-40 mL IntraVENous PRN Ilene Brooks MD        0.9 % sodium chloride infusion  25 mL IntraVENous PRN Ilene Brooks MD        ondansetron (ZOFRAN-ODT) disintegrating tablet 4 mg  4 mg Oral Q8H PRN Ilene Brooks MD        Or    ondansetron [facility]) injection 4 mg  4 mg IntraVENous Q6H PRN Ilene Brooks MD Living Expenses: Not on file   Food Insecurity:     Worried About [address] in the Last Year: Not on file    Lidia of Food in the Last Year: Not on file   Transportation Needs:     Lack of Transportation (Medical): Not on file    Lack of Transportation (Non-Medical): Not on file   Physical Activity:     Days of Exercise per Week: Not on file    Minutes of Exercise per Session: Not on file   Stress:     Feeling of Stress : Not on file   Social Connections:     Frequency of Communication with Friends and Family: Not on file    Frequency of Social Gatherings with Friends and Family: Not on file    Attends [employer] Services: Not on file    Active Member of [address] [place] [address] or Organizations: Not on file    Attends Club or Organization Meetings: Not on file    Marital Status: Not on file   Intimate Partner Violence:     Fear of Current or Ex-Partner: Not on file    Emotionally Abused: Not on file    Physically Abused: Not on file    Sexually Abused: Not on file   Housing Stability:     Unable to Pay for Housing in the Last Year: Not on file    Number of [place] in the Last Year: Not on file    Unstable Housing in the Last Year: Not on file          Family History:     No family history on file.   REVIEW OF SYSTEMS:    Review of Systems    PHYSICAL EXAM:      Vitals:  /69   Pulse 89   Temp 98 °F (36.7 °C) (Oral)   Resp 18   Ht 5' 1\" (1.549 m)   Wt 131 lb 11.2 oz (59.7 kg)   SpO2 94%   BMI 24.88 kg/m²     CONSTITUTIONAL:  awake, alert, cooperative, no apparent distress, and appears stated age NAD  EYES:  pupils equal, round and reactive to light, extra ocular muscles intact,sclera clear, conjunctiva normal  NECK:  Supple, symmetrical, trachea midline, no adenopathy, thyroid symmetric, not enlarged and no tenderness, skin normal  HEMATOLOGIC/LYMPHATICS:  no cervical lymphadenopathy, nosupraclavicular lymphadenopathy, no axillary lymphadenopathy and no inguinal lymphadenopathy  LUNGS: No increased work of breathing, good air exchange, clear to auscultation bilaterally, no crackles or wheezing  CARDIOVASCULAR:  , regular rate and rhythm, normal S1 and S2, no S3 or S4, and no murmur noted  ABDOMEN:  No scars, normal bowel sounds, soft, non-distended, non-tender, no masses palpated, no hepatosplenomegally      MUSCULOSKELETAL:  There is no redness, warmth, or swelling of the joints. Full range of motion noted. Motor strength is 5 out of 5 all extremities bilaterally. NEUROLOGIC:  Awake, alert, oriented to name, place andtime. Cranial nerves II-XII are grossly intact. Motor is 5 out of 5 bilaterally. SKIN:  no bruising or bleeding      DATA:    PT/INR:    Recent Labs     03/16/22  0630 03/17/22  0746   PROT 6.6  --    INR  --  2.35*     PTT:No results for input(s): APTT in the last 72 hours. CMP:    Lab Results   Component Value Date     03/17/2022    K 5.4 03/17/2022    CL 99 03/17/2022    CO2 24 03/17/2022    BUN 16 03/17/2022    PROT 6.6 03/16/2022     Magnesium:    Lab Results   Component Value Date    MG 1.80 02/15/2022     Phosphorus:  No components found for: PO4  Calcium:  No results found for: CA  CBC:    Lab Results   Component Value Date    WBC 18.2 03/17/2022    RBC 3.00 03/17/2022    HGB 9.4 03/17/2022    HCT 29.7 03/17/2022    MCV 98.9 03/17/2022    RDW 27.1 03/17/2022     03/17/2022     DIFF:    Lab Results   Component Value Date    MCV 98.9 03/17/2022    RDW 27.1 03/17/2022      LDH:  @labcrnt(LDH)@  Uric Acid:  @labcrnt(URIC)@    Radiology Review:  XR CHEST PORTABLE (3/16/2022):   Impression   Mild indistinctness of the pulmonary vasculature, compatible with mild edema         Problem List  Patient Active Problem List   Diagnosis    Left knee DJD    TIA (transient ischemic attack)    Transient cerebral ischemia    Other and unspecified hyperlipidemia    Essential hypertension    Occlusion and stenosis of carotid artery    HLD (hyperlipidemia)    AF (atrial fibrillation) ([facility] [address]    Anemia    Chest pain    Coronary artery disease involving native coronary artery of native heart without angina pectoris    Acute respiratory failure (HCC)       IMPRESSION/RECOMMENDATIONS:  1.) Primary myelofibrosis  - Was on Hydrea, but was not well managed on this. - A repeat bone marrow biopsy on 3/07/2022 showed fibrosis but no clear evidence of dysplasia. - Plan is for Ruxolitinib as an outpatient. She does not have it yet. - This is the reason for abnormal CBC, bandemia, etc.    2.) CAD  - C w/ CECIL to 2nd diag on 2/11/2022.    3.) Cardiomyopathy  - Echocardiogram on 2/09/2022 showed LVEF 45-50% and grade 2 diastolic dysfunction.  - Now on PO Lasix 20 mg QD.    4.) PAF  - On Eliquis    Thank you for asking me to see the patient.        Abiodun Carmichael MD  PleaseContact Through [employer]

## 2022-03-18 NOTE — PROGRESS NOTES
Physical Therapy    Facility/Department: [facility] A2 CARD TELEMETRY  Initial Assessment, Treatment and Discharge Summary    NAME: Alissa Noriega  : 1940  MRN: [id number]    Date of Service: 3/18/2022    Discharge Recommendations:  Home with assist PRN   PT Equipment Recommendations  Equipment Needed: No    Assessment   Assessment: Patient is an 78year old female who was admitted to [facility] on 3/16/22 with respiratory failure. Patient is normally able to independently ambulate household distances without an assistive devices. Today the patient ambulated 150 feet without an assistive device with modified independence. She also ascended 3 steps with a rail and modified independence. Patient has returned to her prior modified independent level of function. Her O2 did decrease to 88-89% on room air on exertion. Patient's RN Micheal Chowdhury made aware. Patient's home oxygen needs will need to be re-evaluated by her medical/respiratory/nursing team prior to safe discharge home. Patient appears to be at her baseline level of function. Patient has met all of her acute PT goals. No additional skilled acute PT needs. Patient is safe for home, when medically stable, with PRN supervision/assist. PT signing off. Treatment Diagnosis: decreased independence with functional mobility. Specific instructions for Next Treatment: N/A PT signing off. Prognosis: Excellent  Decision Making: Low Complexity  PT Education: Goals;Gait Training;Disease Specific Education; Injury Prevention;PT Role;Functional Mobility Training;Plan of Care;Pressure Relief;Home Exercise Program;General Safety  Patient Education: Disease specific education: patient educated on benefits of increased mobility, completing her exercises, use of call bell and how to complete pursed lip breathing. Patient verbalized understanding. Barriers to Learning: none  No Skilled PT:  At baseline function  REQUIRES PT FOLLOW UP: No  Activity Tolerance  Activity Tolerance: Patient Tolerated treatment well  Activity Tolerance: Vitals: 120/69 85 BPM 94% on 2.5L. after ambulating O2 was 95% on 1L. after stair climbing/second ambulation decreased to 88% on room air on exertion. Patient Diagnosis(es): There were no encounter diagnoses. has a past medical history of Allergic rhinitis, CAD S/P percutaneous coronary angioplasty, Cancer ([facility] [address] GI bleed, Glaucoma, Hyperlipidemia, Hypertension, Myelodysplastic syndrome ([facility] [address] Radiation therapy complication, and UTI (lower urinary tract infection). has a past surgical history that includes Cataract removal with implant; Breast surgery (1999); Hysterectomy; knee surgery (2/6/2012); and CT BIOPSY BONE MARROW (3/7/2022). Restrictions  Restrictions/Precautions  Restrictions/Precautions: Fall Risk  Position Activity Restriction  Other position/activity restrictions: 2.5L  Vision/Hearing  Vision: Impaired  Hearing: Exceptions to [facility]  Hearing Exceptions: Hard of hearing/hearing concerns; No hearing aid     Subjective  General  Chart Reviewed: Yes  Patient assessed for rehabilitation services?: Yes  Response To Previous Treatment: Not applicable  Referring Practitioner: Karlos Santos MD  Referral Date : 03/18/22  General Comment  Comments: Supine in bed upon entry of therapy staff. Cleared for therapy by RN. Subjective  Subjective: Patient agreed to participate.   Pain Screening  Patient Currently in Pain: Denies  Vital Signs  Patient Currently in Pain: Denies  Pre Treatment Pain Screening  Intervention List: Patient able to continue with treatment    Orientation  Orientation  Overall Orientation Status: Within Normal Limits  Social/Functional History  Social/Functional History  Lives With: Alone  Type of Home: House  Home Layout: Two level (full bathroom and bedroom on first floor)  Home Access: Stairs to enter without rails  Entrance Stairs - Number of Steps: 2+1 (uses cane on steps)  Bathroom Shower/Tub: Walk-in shower  Bathroom Toilet: Handicap height  Bathroom Equipment: Grab bars in shower,Grab bars around toilet  Home Equipment: [employer]  ADL Assistance: Independent  Homemaking Assistance: Independent  Ambulation Assistance: Independent  Transfer Assistance: Independent  Active : No  Patient's  Info: Children assist with transportation. Occupation: Retired  Type of occupation:   Additional Comments: No falls  Cognition   Cognition  Overall Cognitive Status: WFL    Objective     Observation/Palpation  Posture: Fair    PROM RLE (degrees)  RLE PROM: WNL  AROM RLE (degrees)  RLE AROM: WNL  PROM LLE (degrees)  LLE PROM: WNL  AROM LLE (degrees)  LLE AROM : WNL  Strength RLE  Strength RLE: WFL  Strength LLE  Strength LLE: WFL  Motor Control  Gross Motor?: WFL  Sensation  Overall Sensation Status: Impaired (history of neuropathy in bilateral feet)  Bed mobility  Supine to Sit: Modified independent  Sit to Supine: Modified independent  Scooting: Modified independent  Comment: head of bed elevated  Transfers  Sit to Stand: Modified independent  Stand to sit: Modified independent  Bed to Chair: Modified independent  Ambulation  Ambulation?: Yes  More Ambulation?: No  Ambulation 1  Surface: level tile  Device: No Device  Assistance: Modified Independent  Quality of Gait: wide base of support, kyphotic posture. cueing to correct but patient said, \"this is how I typically walk. \" decreased gait velocity. Gait Deviations: Increased RACHID; Slow Sylvia;Decreased step length;Decreased step height  Distance: 50 feet. 150 feet. Comments: No complaints of SOB, chest pain or dizziness. no loss of balance. Stairs/Curb  Stairs?: Yes  Stairs  # Steps : 3  Stairs Height: 6\"  Rails: Left ascending  Device: No Device  Assistance: Modified independent   Comment: No complaints of SOB, chest pain or dizziness. safe non-reciprocal pattern.  no loss of balance     Balance  Posture: Fair  Sitting - Static: Good  Sitting - Dynamic: Good  Standing - Static: Good  Standing - Dynamic: Good  Exercises  Hip Flexion: 1 x 15 bilateral  Hip Abduction: 1 x 15 bilateral  Knee Long Arc Quad: 1 x 15 bilateral  Ankle Pumps: 1 x 15 bilateral  Comments: cueing for sequencing and technique. Other exercises  Other exercises?: No     Plan   Plan  Times per week: N/A PT signing off. Plan weeks: N/A PT signing off. Specific instructions for Next Treatment: N/A PT signing off. Safety Devices  Type of devices: All fall risk precautions in place,Call light within reach,Nurse notified,Left in chair,Chair alarm in place,Patient at risk for falls,Gait belt    AM-PAC Score  AM-PAC Inpatient Mobility Raw Score : 24 (03/18/22 1645)  AM-PAC Inpatient T-Scale Score : 61.14 (03/18/22 1645)  Mobility Inpatient CMS 0-100% Score: 0 (03/18/22 1645)  Mobility Inpatient CMS G-Code Modifier : [facility] [place] (03/18/22 1645)          Goals  Short term goals  Time Frame for Short term goals: 1 session 3/18/22  Short term goal 1: Patient will be independent with bed mobility and transfers. Goal met 3/18/22  Short term goal 2: Patient will ambulate 150 feet with modified independence. Goal met 3/18/22  Short term goal 3: Patient will ascend 3 steps with rail and mod I. Goal met 3/18/22  Patient Goals   Patient goals : To improve her breathing. To go home soon without oxygen.        Therapy Time   Individual Concurrent Group Co-treatment   Time In 1489         Time Out 1550         Minutes 35         Timed Code Treatment Minutes: 25 Minutes (10 minute evaluation)       Jeaneth Rubio, PT

## 2022-03-18 NOTE — PLAN OF CARE
the shift. Patient call out appropriately, bed alarm is on, wheels are locked and bed is in lowest position. Patient is aware of safety precautions taken to keep her safe. Patient has no current complaints of pain. Patient has no further evidence of skin breakdown, patient moves around frequently in the bed. Patient is requiring 2L of supplemental oxygen at thsi time, unable to wean per overnight nurse, patient dropped to 86%.  Electronically signed by Livia Pires RN on 3/18/2022 at 10:43 AM          :

## 2022-03-18 NOTE — PROGRESS NOTES
[facility] ([place]   PROGRESS NOTE  [phone]      Attending Physician: Alessia Perkins MD  Reason for Consultation/Chief Complaint: Shortness of breath    Subjective     Patient reports that she is feeling much better this morning and that her shortness of breath has improved. CURRENT Medications:  lisinopril (PRINIVIL;ZESTRIL) tablet 10 mg, Daily  [START ON 3/19/2022] furosemide (LASIX) tablet 40 mg, Daily  magnesium sulfate 4000 mg in 100 mL IVPB premix, Once  sodium chloride flush 0.9 % injection 5-40 mL, 2 times per day  sodium chloride flush 0.9 % injection 5-40 mL, PRN  0.9 % sodium chloride infusion, PRN  ondansetron (ZOFRAN-ODT) disintegrating tablet 4 mg, Q8H PRN   Or  ondansetron (ZOFRAN) injection 4 mg, Q6H PRN  polyethylene glycol (GLYCOLAX) packet 17 g, Daily PRN  acetaminophen (TYLENOL) tablet 650 mg, Q6H PRN   Or  acetaminophen (TYLENOL) suppository 650 mg, Q6H PRN  allopurinol (ZYLOPRIM) tablet 300 mg, Daily  apixaban (ELIQUIS) tablet 5 mg, BID  clopidogrel (PLAVIX) tablet 75 mg, Daily  atorvastatin (LIPITOR) tablet 80 mg, Nightly  pantoprazole (PROTONIX) tablet 40 mg, BID AC  metoprolol succinate (TOPROL XL) extended release tablet 25 mg, BID  sertraline (ZOLOFT) tablet 50 mg, Daily        Allergies:  Oxycodone     Review of Systems:   General: No chills or sweats. Cardiac: No chest pain or palpitations. Respiratory: No cough or shortness of breath. GI: No nausea, vomiting, or diarrhea. Neuro: No lightheadedness or dizziness. Objective   PHYSICAL EXAM:    Vitals:    03/18/22 1608   BP: 109/63   Pulse: 86   Resp:    Temp: 98.3 °F (36.8 °C)   SpO2: 93%    Weight: 131 lb 11.2 oz (59.7 kg)      General: Very pleasant elderly female lying in bed in no acute distress. HEENT: Normocephalic, atraumatic, non-icteric, hearing intact, nares normal, mucous membranes moist.  Neck: No appreciable JVD. Heart: Regular rate and rhythm.  Normal S1 and S2. Grade II-III/VI holosystolic murmur radiating to apex. No rubs or gallops. Lungs: Normal respiratory effort. Faint bibasilar crackles. No wheezes or rhonchi. Abdomen: Soft, non-tender. Normoactive bowel sounds. No masses or organomegaly. Skin: No rashes, wounds, or lesions. Pulses: 2+ and symmetric. Extremities: No clubbing, cyanosis, or edema. Psych: Normal mood and affect. Neuro: Alert and oriented to person, place, and time.  No focal deficits noted.       Labs   CBC:   Lab Results   Component Value Date    WBC 20.5 03/18/2022    RBC 3.31 03/18/2022    HGB 10.7 03/18/2022    HCT 33.7 03/18/2022    .0 03/18/2022    RDW 27.4 03/18/2022     03/18/2022     CMP:  Lab Results   Component Value Date     03/18/2022    K 4.3 03/18/2022    K 5.4 03/17/2022     03/18/2022    CO2 25 03/18/2022    BUN 12 03/18/2022    CREATININE <0.5 03/18/2022    GFRAA >60 03/18/2022    GFRAA 136 03/12/2012    AGRATIO 1.6 03/16/2022    LABGLOM >60 03/18/2022    GLUCOSE 109 03/18/2022    PROT 6.6 03/16/2022    CALCIUM 8.8 03/18/2022    BILITOT 1.1 03/16/2022    ALKPHOS 198 03/16/2022    AST 12 03/16/2022    ALT 19 03/16/2022     PT/INR:  No results found for: PTINR  HgBA1c:  Lab Results   Component Value Date    LABA1C 5.3 04/17/2021     Lab Results   Component Value Date    TROPONINI 0.02 (H) 03/16/2022         Cardiac Data     Last EKG: Sinus tachycardia with non-specific ST abnormality     Echo:  TTE 9/30/21:  Conclusions   Summary   Left ventricular systolic function is normal with ejection fraction   estimated at 55-60 %.   No regional wall motion abnormalities are noted.   There is mild concentric left ventricular hypertrophy.   Normal left ventricular diastolic filling pressure.   Mild anterior and posterior mitral annular calcification is present.   Moderate mitral regurgitation.   The appears to be a small calcified echogenic mass near LVOT, likely   represents degenerative valve disease, and is less likely a vegetation.  [employer] was compared to 2014 echo and the LVOT mass was not present in 2014.   Mild aortic regurgitation is present.   Moderate tricuspid regurgitation.   Normal systolic pulmonary artery pressure (SPAP) estimated at 32 mmHg (RA   pressure 3 mmHg).  Mild pulmonic regurgitation present.   1/23/2014 55% EF, mild MR.     TTE 2/14/22:  Conclusions   Summary   Left ventricular cavity size is normal. There is mild left ventricular   hypertrophy. Overall left ventricular systolic function appears mildly   reduced with an ejection fraction of 45-50%. There is hypokinesis of the apex   and distal septum /anteroseptum. Diastolic filling parameters suggest grade   II diastolic dysfunction. There is increased LVEDP and LAP.   The mitral valve leaflets appear normal in structure. Mitral annular   calcification is present. Mild to moderate mitral regurgitation is present.   No evidence of mitral valve stenosis. There is a linear mobile echodensity   off the anterior mitral leaflet (ventricular aspect), likely a torn cord.   The left atrium is moderately dilated.   Mild to moderate tricuspid regurgitation.     Stress Test: N/A     Cath:  [facility] 2/11/22:  Left main normal.  LAD mid vessel 70% diffuse disease after second diagonal branch.  MABEL grade III flow present.  Second diagonal branch ostial 99% stenosis mid vessel 99% stenosis diffuse disease in between. Right coronary artery collateralized distal right coronary occluded  Circumflex and obtuse marginal branches with diffuse moderate disease. LVEDP 20 mmHg LVEF estimated at 40% with diffuse hypokinesia more pronounced in the anterior apex. Conclusion: Successful PTCA and stenting of diagonal branch #2 with 99% stenosis reduced to 0 and ostial stenosis with from 90 to 20% residual.  Plan:  hydration Lasix bedrest for tonight. Resume activity 8 AM on February 12, 2022.    Watch platelets and hemoglobin in this patient with GI bleeding and myelodysplastic syndrome.  Hold Eliquis and continue Plavix and aspirin 81 mg daily     Other Studies:   Chest X-ray 3/16/22:  Mild indistinctness of the pulmonary vasculature, compatible with mild edema    Chest X-ray 3/18/22:  Small left pleural effusion with moderate left basilar atelectasis or   consolidation, worse since 03/16/2022.       Trace right pleural effusion. Assessment and Plan      1. Acute LV systolic heart failure/ischemic cardiomyopathy - May have been exacerbated by hypertensive urgency. BP is now much better controlled and patient's reports improvement in shortness of breath after receiving lasix. Repeat chest x-ray today shows small left pleural effusion with probable moderate left basilar atelectasis and a trace right pleural effusion.  -Recommend increasing PO lasix to 40 mg daily and would plan to discharge on this dose  -OK to resume lisinopril 10 mg daily (was apparently held yesterday due to concern for hyperkalemia but specimen was hemolyzed and repeat potassium today is WNL)  -Continue metoprolol succinate 25 mg BID  -Monitor strict I/Os, obtain daily standing weights  -2L per day fluid restriction, low sodium diet  -Will arrange for close follow-up with [facility] in one week and would plan to repeat BMP and magnesium level at that time to monitor creatinine and electrolytes     2. Acute hypoxic respiratory failure - Resolved. Likely secondary to combination of acute heart failure and atelectasis. -Diuretic regimen as above  -Recommend use of incentive spirometer     3. CAD - S/p PCI of econd diagonal artery with a Resolute Denis 2.5 x 18 mm CECIL on 2/11/22. There was a long calcified 70% mid-LAD stenosis beyond the diagonal for which PCI was attempted but was later abandoned due to contrast load and prolonged procedure time and it was therefore managed medically.   She denies recent angina and it is suspected that her recent dyspnea is likely secondary to acute heart failure.  -Continue Plavix 75 mg daily, atorvastatin 80 mg qHS, and metoprolol succinate 25 mg BID (aspirin was discontinued one month after PCI as patient is also taking Eliquis for a-fib)      4. Hypertensive urgency - BP was markedly elevated on presentation to [facility] ED, but is now much better controlled. -Resume lisinopril to 10 mg daily as above  -Continue metoprolol succinate 25 mg BID  -Will need continued close monitoring of BP and further titration of antihypertensive regimen as needed     5. Paroxysmal atrial fibrillation - Currently in sinus rhythm.  -Continue Eliquis 5 mg BID and beta-blocker     6. Hyperlipidemia  -Continue high-intensity statin     7. MDS  -Monitor CBCs      Thank you for allowing us to participate in the care of Guero Dunn. Cardiology will sign off at this time. Please call me with any questions [phone]. Cooper Melchor DO  [facility]  [phone] [facility]  [phone] [address] [place] [address]  3/18/2022 4:23 PM    I will address the patient's cardiac risk factors and adjusted pharmacologic treatment as needed. In addition, I have reinforced the need for patient directed risk factor modification. All questions and concerns were addressed to the patient/family. Alternatives to my treatment were discussed. The note was completed using EMR. Every effort was made to ensure accuracy; however, inadvertent computerized transcription errors may be present.

## 2022-03-19 ENCOUNTER — APPOINTMENT (OUTPATIENT)
Dept: CT IMAGING | Age: 82
DRG: 291 | End: 2022-03-19
Attending: INTERNAL MEDICINE
Payer: MEDICARE

## 2022-03-19 VITALS
OXYGEN SATURATION: 90 % | SYSTOLIC BLOOD PRESSURE: 125 MMHG | RESPIRATION RATE: 15 BRPM | HEIGHT: 61 IN | DIASTOLIC BLOOD PRESSURE: 67 MMHG | HEART RATE: 80 BPM | TEMPERATURE: 98.7 F | BODY MASS INDEX: 24.73 KG/M2 | WEIGHT: 131 LBS

## 2022-03-19 LAB
ANION GAP SERPL CALCULATED.3IONS-SCNC: 14 MMOL/L (ref 3–16)
ANISOCYTOSIS: ABNORMAL
BANDED NEUTROPHILS RELATIVE PERCENT: 20 % (ref 0–7)
BASOPHILS ABSOLUTE: 0 K/UL (ref 0–0.2)
BASOPHILS RELATIVE PERCENT: 0 %
BUN BLDV-MCNC: 15 MG/DL (ref 7–20)
CALCIUM SERPL-MCNC: 9.1 MG/DL (ref 8.3–10.6)
CHLORIDE BLD-SCNC: 97 MMOL/L (ref 99–110)
CO2: 28 MMOL/L (ref 21–32)
CREAT SERPL-MCNC: 0.6 MG/DL (ref 0.6–1.2)
EOSINOPHILS ABSOLUTE: 0.4 K/UL (ref 0–0.6)
EOSINOPHILS RELATIVE PERCENT: 2 %
GFR AFRICAN AMERICAN: >60
GFR NON-AFRICAN AMERICAN: >60
GLUCOSE BLD-MCNC: 103 MG/DL (ref 70–99)
HCT VFR BLD CALC: 32.1 % (ref 36–48)
HEMOGLOBIN: 10.2 G/DL (ref 12–16)
LACTIC ACID: 3.5 MMOL/L (ref 0.4–2)
LACTIC ACID: 3.6 MMOL/L (ref 0.4–2)
LACTIC ACID: 3.8 MMOL/L (ref 0.4–2)
LYMPHOCYTES ABSOLUTE: 1.4 K/UL (ref 1–5.1)
LYMPHOCYTES RELATIVE PERCENT: 7 %
MACROCYTES: ABNORMAL
MAGNESIUM: 2.3 MG/DL (ref 1.8–2.4)
MCH RBC QN AUTO: 31.5 PG (ref 26–34)
MCHC RBC AUTO-ENTMCNC: 31.7 G/DL (ref 31–36)
MCV RBC AUTO: 99.3 FL (ref 80–100)
METAMYELOCYTES RELATIVE PERCENT: 4 %
MICROCYTES: ABNORMAL
MONOCYTES ABSOLUTE: 1.2 K/UL (ref 0–1.3)
MONOCYTES RELATIVE PERCENT: 6 %
NEUTROPHILS ABSOLUTE: 16.7 K/UL (ref 1.7–7.7)
NEUTROPHILS RELATIVE PERCENT: 61 %
OVALOCYTES: ABNORMAL
PDW BLD-RTO: 27.1 % (ref 12.4–15.4)
PLATELET # BLD: 464 K/UL (ref 135–450)
PMV BLD AUTO: 8.7 FL (ref 5–10.5)
POIKILOCYTES: ABNORMAL
POLYCHROMASIA: ABNORMAL
POTASSIUM SERPL-SCNC: 4.2 MMOL/L (ref 3.5–5.1)
RBC # BLD: 3.23 M/UL (ref 4–5.2)
SCHISTOCYTES: ABNORMAL
SODIUM BLD-SCNC: 139 MMOL/L (ref 136–145)
SPHEROCYTES: ABNORMAL
STOMATOCYTES: ABNORMAL
TARGET CELLS: ABNORMAL
WBC # BLD: 19.6 K/UL (ref 4–11)

## 2022-03-19 PROCEDURE — 2580000003 HC RX 258: Performed by: INTERNAL MEDICINE

## 2022-03-19 PROCEDURE — 36415 COLL VENOUS BLD VENIPUNCTURE: CPT

## 2022-03-19 PROCEDURE — 94761 N-INVAS EAR/PLS OXIMETRY MLT: CPT

## 2022-03-19 PROCEDURE — 83605 ASSAY OF LACTIC ACID: CPT

## 2022-03-19 PROCEDURE — 2700000000 HC OXYGEN THERAPY PER DAY

## 2022-03-19 PROCEDURE — 85025 COMPLETE CBC W/AUTO DIFF WBC: CPT

## 2022-03-19 PROCEDURE — 80048 BASIC METABOLIC PNL TOTAL CA: CPT

## 2022-03-19 PROCEDURE — 6360000004 HC RX CONTRAST MEDICATION: Performed by: INTERNAL MEDICINE

## 2022-03-19 PROCEDURE — 71260 CT THORAX DX C+: CPT

## 2022-03-19 PROCEDURE — 83735 ASSAY OF MAGNESIUM: CPT

## 2022-03-19 PROCEDURE — 6370000000 HC RX 637 (ALT 250 FOR IP): Performed by: INTERNAL MEDICINE

## 2022-03-19 RX ORDER — LISINOPRIL 10 MG/1
10 TABLET ORAL DAILY
Qty: 30 TABLET | Refills: 1 | Status: SHIPPED | OUTPATIENT
Start: 2022-03-20 | End: 2022-04-07 | Stop reason: SDUPTHER

## 2022-03-19 RX ADMIN — IOPAMIDOL 75 ML: 755 INJECTION, SOLUTION INTRAVENOUS at 11:55

## 2022-03-19 RX ADMIN — ALLOPURINOL 300 MG: 300 TABLET ORAL at 09:16

## 2022-03-19 RX ADMIN — LISINOPRIL 10 MG: 10 TABLET ORAL at 09:16

## 2022-03-19 RX ADMIN — PANTOPRAZOLE SODIUM 40 MG: 40 TABLET, DELAYED RELEASE ORAL at 17:05

## 2022-03-19 RX ADMIN — FUROSEMIDE 40 MG: 40 TABLET ORAL at 09:16

## 2022-03-19 RX ADMIN — Medication 10 ML: at 09:16

## 2022-03-19 RX ADMIN — SERTRALINE HYDROCHLORIDE 50 MG: 50 TABLET ORAL at 09:16

## 2022-03-19 RX ADMIN — METOPROLOL SUCCINATE 25 MG: 25 TABLET, EXTENDED RELEASE ORAL at 09:16

## 2022-03-19 RX ADMIN — APIXABAN 5 MG: 5 TABLET, FILM COATED ORAL at 09:16

## 2022-03-19 RX ADMIN — CLOPIDOGREL BISULFATE 75 MG: 75 TABLET ORAL at 09:16

## 2022-03-19 RX ADMIN — PANTOPRAZOLE SODIUM 40 MG: 40 TABLET, DELAYED RELEASE ORAL at 06:51

## 2022-03-19 ASSESSMENT — PAIN SCALES - WONG BAKER
WONGBAKER_NUMERICALRESPONSE: 0

## 2022-03-19 ASSESSMENT — PAIN DESCRIPTION - PROGRESSION
CLINICAL_PROGRESSION: GRADUALLY IMPROVING

## 2022-03-19 ASSESSMENT — PAIN SCALES - GENERAL
PAINLEVEL_OUTOF10: 0
PAINLEVEL_OUTOF10: 0

## 2022-03-19 NOTE — CARE COORDINATION
[facility] can accept and will pull from [facility]. Remains pending [employer].

## 2022-03-19 NOTE — PROGRESS NOTES
Oxygen documentation:    1. O2 saturation at REST on ROOM AIR = __85____%    If saturation is 89% or above please proceed with steps 2 and 3. 2. O2 saturation with AMBULATION of _____ feet on ROOM AIR = _____%  3.  O2 saturation with AMBULATION on __1_____ liter/min = __90____%    DCP notified: _yes_____

## 2022-03-19 NOTE — DISCHARGE INSTR - ACTIVITY
Resume safe activity as tolerated with 1 Liter of nasal cannula oxygen unless directed otherwise by MD.

## 2022-03-19 NOTE — PLAN OF CARE
and/or Family's stated Goal of Care this Admission: increase activity tolerance, better understand heart failure and disease management, and be more comfortable prior to discharge        :

## 2022-03-19 NOTE — PROGRESS NOTES
Pt d/c'd home with family in personal car with all belongings and home oxygen concentrator. Removed peripheral IV and stopped bleeding. Catheter intact. Pt tolerated well. No redness noted at site. Notified [facility] and removed tele box. Reviewed d/c instructions, home meds, and  f/u information utilizing teach-back method. Scripts sent to pharmacy. Patient verbalized understanding. Patient taken to main entrance in wheelchair with oxygen.

## 2022-03-19 NOTE — PLAN OF CARE
Problem: Skin Integrity:  Goal: Will show no infection signs and symptoms  Description: Will show no infection signs and symptoms  Outcome: Ongoing     Problem: Infection:  Goal: Will remain free from infection  Description: Will remain free from infection  Outcome: Ongoing     Problem: Safety:  Goal: Free from accidental physical injury  Description: Free from accidental physical injury  Outcome: Ongoing     Problem: HEMODYNAMIC STATUS  Goal: Patient has stable vital signs and fluid balance  Outcome: Ongoing     Problem: ACTIVITY INTOLERANCE/IMPAIRED MOBILITY  Goal: Mobility/activity is maintained at optimum level for patient  Outcome: Ongoing

## 2022-03-19 NOTE — CARE COORDINATION
CASE MANAGEMENT DISCHARGE SUMMARY      Discharge to: Home with [address] Equipment ordered/agency: [employer] [facility]    Transportation:    Family/car: yes     Confirmed discharge plan with:     Patient: yes      Family: per pt     Facility/Agency, name: [address] [place] [address] [facility]     RN, name: Marzena Marinelli RN    Note: Discharging nurse to complete SILVIANO, reconcile AVS, and place final copy with patient's discharge packet. RN to ensure that written prescriptions for  Level II medications are sent with patient to the facility as per protocol.

## 2022-03-19 NOTE — DISCHARGE SUMMARY
Hospital Medicine Discharge Summary    Patient ID: Capri Mandujano      Patient's PCP: Wilfred Bullock DO    Admit Date: 3/16/2022     Discharge Date: 3/19/2022      Admitting Provider: No admitting provider for patient encounter. Discharge Provider: Ilene Brooks MD     Discharge Diagnoses: Active Hospital Problems    Diagnosis     Acute respiratory failure ([facility] [address] [J96.00]     Anemia [D64.9]        The patient was seen and examined on day of discharge and this discharge summary is in conjunction with any daily progress note from day of discharge. Hospital Course:     History Of Present Illness:       78 y.o. female with hx of MDS, cad/cardiomyopathy, HTN, HLD who presented to [facility] with acute onset of SOB around 2am today. She had to get up to use bathroom this AM and noted to feel anxous and couldn't relax. Pt upon returning to be, noted orthopnea and LI but no CP. No issues all day Tuesday. Of note, pt was recently at [facility] for CHF exacerbation and nstemi(had stent placed). -pt presented to [address] ER via EMS and was transferred to [facility] for further care.   -of note, pt states her cardiologist Dr. Lorraine Earl stopped lasix last week but instructed pt to weigh daily     ER course- pt noted to have 88% sats, bcx obtained, breathing tx given, given iv lasix(with improvement), nitrobid paste           acute resp failure- likely due to acute systolic HF, not on oxygen at home, noted 88% on ra on arrival to ER, improved here after getting iv lasix  -Tele  -Trended tha were unremarkable   -was on IV lasix  -needed supp oxygen on dc, wean down as tolerated     Lactic acidosis/leukocytosis/bandemia-  Unclear etiology, possibly reactive, no obvious source of infection noted, pt completely asymptomatic.  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 Refills can be address at that visit since pt was last filled in August.    plan for ruxolitinib as outpt   -CTc/a/p obtained 3/19(unremarkable for ischemia, noted mild pulm edema, small bila effusions, no acute findings in abd/pelvis)    Hyperkalemia- resolved, mild, noted 5.4 on 3/17  -held acei     Cardiomyopathy- Echo 2/2022(EF  45%, d2 dd, mod mr(?torn cord on anterior mitral leaflet), mod tr  -on bb/acei   -cards consulted     Afib- rate controlled  On eliquis, bb     HTN- unclear if controlled or not at home  -on acei/bb     HLD-on statin     MDS- unclear if related to wbc  -mgmt as outpt     CAD- with recent nstemi/stent at [facility]  -continued statin/plavix  -no longer on asa     Gout- continued allopurinol     Recent gastric ulcer/porcelain gallbladder- sees dr. Camila Schmitz  -supposed to have outpt EGD, consulted GI here per family request, plan for outpt EGD/cscope per note(would like to hold plavix/eliquis at that time)       Physical Exam Performed:     /67   Pulse 80   Temp 98.7 °F (37.1 °C) (Oral)   Resp 15   Ht 5' 1\" (1.549 m)   Wt 131 lb (59.4 kg)   SpO2 90%   BMI 24.75 kg/m²       General appearance:  No apparent distress, appears stated age and cooperative. HEENT:  Normal cephalic, atraumatic without obvious deformity. Pupils equal, round, and reactive to light.  Extra ocular muscles intact. Conjunctivae/corneas clear. Neck: Supple, with full range of motion. No jugular venous distention. Trachea midline. Respiratory:  Normal respiratory effort. Clear to auscultation, bilaterally without Wheezes/Rhonchi. Mild rales at bases  Cardiovascular:  Regular rate and rhythm with normal S1/S2 without murmurs, rubs or gallops. Abdomen: Soft, non-tender, non-distended with normal bowel sounds. Musculoskeletal:  No clubbing, cyanosis or edema bilaterally.  Full range of motion without deformity. Skin: Skin color, texture, turgor normal.  No rashes or lesions. Neurologic:  Neurovascularly intact without any focal sensory/motor deficits.  Cranial nerves: II-XII intact, grossly non-focal.  Psychiatric:  Alert and oriented, thought content appropriate, normal insight  Capillary Refill: Brisk,3 seconds, normal  Peripheral Pulses: +2 palpable, equal bilaterally      Labs: For convenience and continuity at follow-up the following most recent labs are provided:      CBC:    Lab Results   Component Value Date    WBC 19.6 03/19/2022    HGB 10.2 03/19/2022    HCT 32.1 03/19/2022     03/19/2022       Renal:    Lab Results   Component Value Date     03/19/2022    K 4.2 03/19/2022    K 5.4 03/17/2022    CL 97 03/19/2022    CO2 28 03/19/2022    BUN 15 03/19/2022    CREATININE 0.6 03/19/2022    CALCIUM 9.1 03/19/2022         Significant Diagnostic Studies    Radiology:   CT CHEST ABDOMEN PELVIS W CONTRAST   Final Result   Mild pulmonary edema with small to moderate bilateral pleural effusions and   bibasilar atelectasis in the lungs. There are borderline mediastinal and right hilar lymph nodes, which are   nonspecific. No acute findings in the abdomen and pelvis. Mild soft tissue thickening posterior to the anus and rectum is unchanged   from prior study. Correlate with direct visualization if clinically   indicated. Peripheral wall calcification of the gallbladder and mild biliary ductal   dilatation near the jovani hepatis is also unchanged from February 9, 2022. RECOMMENDATIONS:   Unavailable         XR CHEST (2 VW)   Final Result   Small left pleural effusion with moderate left basilar atelectasis or   consolidation, worse since 03/16/2022. Trace right pleural effusion.                 Consults:     IP CONSULT TO CARDIOLOGY  IP CONSULT TO GI  IP CONSULT TO HEART FAILURE NURSE/COORDINATOR  IP CONSULT TO DIETITIAN  IP CONSULT TO HEM/ONC  IP CONSULT TO HEART FAILURE NURSE/COORDINATOR  IP CONSULT TO DIETITIAN  IP CONSULT TO HOME CARE NEEDS    Disposition:  home     Condition at Discharge: Stable    Discharge Instructions/Follow-up:  Cards as outpt, wean down oxygen as outpt    Code Status:  FULL    Activity: activity as tolerated    Diet: regular diet and no added salt, 2L fluid restrict      Discharge Medications:     Discharge Medication List as of 3/19/2022  5:02 PM           Details   lisinopril (PRINIVIL;ZESTRIL) 10 MG tablet Take 1 tablet by mouth daily, Disp-30 tablet, R-1Normal              Details   metoprolol succinate (TOPROL XL) 25 MG extended release tablet Take 1 tablet by mouth in the morning and at bedtime, Disp-60 tablet, R-3Normal      aspirin 81 MG chewable tablet Take 1 tablet by mouth daily, Disp-30 tablet, R-3Normal      atorvastatin (LIPITOR) 80 MG tablet Take 1 tablet by mouth nightly, Disp-30 tablet, R-3Normal      furosemide (LASIX) 40 MG tablet Take 1 tablet by mouth daily, Disp-60 tablet, R-3Normal      clopidogrel (PLAVIX) 75 MG tablet Take 1 tablet by mouth daily, Disp-30 tablet, R-3Normal      pantoprazole (PROTONIX) 40 MG tablet Take 1 tablet by mouth 2 times daily (before meals), Disp-60 tablet, R-0Normal      sertraline (ZOLOFT) 50 MG tablet TAKE ONE TABLET BY MOUTH DAILYHistorical Med      allopurinol (ZYLOPRIM) 300 MG tablet Take 300 mg by mouth dailyHistorical Med      apixaban (ELIQUIS) 5 MG TABS tablet Take by mouth 2 times dailyHistorical Med      acetaminophen (TYLENOL) 500 MG tablet Take 500 mg by mouth every 6 hours as needed for PainHistorical Med      Brinzolamide-Brimonidine (SIMBRINZA) 1-0.2 % SUSP Apply to eye Historical Med      Bimatoprost (LUMIGAN) 0.01 % SOLN Apply 1 drop to eye nightly Each eye Historical Med             Time Spent on discharge is more than 30 minutes in the examination, evaluation, counseling and review of medications and discharge plan. Signed:    Juliann Ceron MD   3/20/2022      Thank you Shaunna Ross DO for the opportunity to be involved in this patient's care. If you have any questions or concerns please feel free to contact me at [phone].

## 2022-03-19 NOTE — PLAN OF CARE
Problem: Skin Integrity:  Goal: Will show no infection signs and symptoms  Description: Will show no infection signs and symptoms  3/19/2022 1021 by Toy Correa RN  Outcome: Ongoing  Note: Pt is at risk for skin breakdown. Pt will have skin assessments every shift, encourage safe ambulation, heels elevated off of the bed, and friction and shear prevented when possible. Will continue to monitor for signs of skin breakdown and enforce prevention measures. Problem: Safety:  Goal: Free from accidental physical injury  Description: Free from accidental physical injury  3/19/2022 1021 by Toy Correa RN  Outcome: Ongoing  Note: Pt will remain free from falls throughout hospital stay. Fall precautions in place, bed alarm on, bed in lowest position with wheels locked and side rails 2/4 up. Room door open and hourly rounding completed. Will continue to monitor throughout shift. Problem: Pain:  Goal: Patient's pain/discomfort is manageable  Description: Patient's pain/discomfort is manageable  3/19/2022 1021 by Toy Correa RN  Outcome: Ongoing  Note: Pt will be satisfied with pain control. Pt uses numeric pain rating scale with reassessments after pain med administration. Will continue to monitor progression throughout shift.

## 2022-03-19 NOTE — PROGRESS NOTES
Oxygen documentation:    1. O2 saturation at REST on ROOM AIR = ___85___%    If saturation is 89% or above please proceed with steps 2 and 3. 2. O2 saturation with AMBULATION of _____ feet on ROOM AIR = _____%  3.  O2 saturation with AMBULATION on _______ liter/min = ______%    DCP notified: ______

## 2022-03-19 NOTE — DISCHARGE INSTR - COC
Continuity of Care Form    Patient Name: Diana Lopez   :  1940  MRN:  [id number]    [facility] date:  3/16/2022  Discharge date:  3/19/22    Code Status Order: Full Code   Advance Directives:      Admitting Physician:  No admitting provider for patient encounter.   PCP: Tiburcio Collet, DO    Discharging Nurse: [facility] Unit/Room#: 1270/2515-64  Discharging Unit Phone Number: [phone]    Emergency Contact:   Extended Emergency Contact Information  Primary Emergency Contact: Adama Jackson  Mobile Phone: [phone]  Relation: Child  Secondary Emergency Contact: Morgan Alexander Phone: [phone]  Mobile Phone: [phone]  Relation: Child    Past Surgical History:  Past Surgical History:   Procedure Laterality Date    BREAST SURGERY      lumpectomy right    CATARACT REMOVAL WITH IMPLANT      bilateral    CT BONE MARROW BIOPSY  3/7/2022    CT BONE MARROW BIOPSY 3/7/2022 [facility] CT SCAN    HYSTERECTOMY      age 34    KNEE SURGERY  2012    left side       Immunization History:   Immunization History   Administered Date(s) Administered    COVID-19, Pfizer Purple top, DILUTE for use, 12+ yrs, 30mcg/0.3mL dose 2021, 2021, 2021       Active Problems:  Patient Active Problem List   Diagnosis Code    Left knee DJD M17.12    TIA (transient ischemic attack) G45.9    Transient cerebral ischemia G45.9    Other and unspecified hyperlipidemia E78.5    Essential hypertension I10    Occlusion and stenosis of carotid artery I65.29    HLD (hyperlipidemia) E78.5    AF (atrial fibrillation) (HCC) I48.91    Anemia D64.9    Chest pain R07.9    Coronary artery disease involving native coronary artery of native heart without angina pectoris I25.10    Acute respiratory failure ([facility]) J96.00       Isolation/Infection:   Isolation            No Isolation          Patient Infection Status       Infection Onset Added Last Indicated Last Indicated By Review Planned Expiration Resolved Resolved By    ASTRID Rule Out 03/19/22 03/19/22 03/19/22 GI Bacterial Pathogens By PCR (Ordered)        Resolved    COVID-19 (Rule Out) 03/16/22 03/16/22 03/16/22 COVID-19, Rapid (Ordered)   03/16/22 Rule-Out Test Resulted            Nurse Assessment:  Last Vital Signs: /67   Pulse 80   Temp 98.7 °F (37.1 °C) (Oral)   Resp 15   Ht 5' 1\" (1.549 m)   Wt 131 lb (59.4 kg)   SpO2 90%   BMI 24.75 kg/m²     Last documented pain score (0-10 scale): Pain Level: 0  Last Weight:   Wt Readings from Last 1 Encounters:   03/19/22 131 lb (59.4 kg)     Mental Status:  oriented, alert, coherent, logical, thought processes intact, and able to concentrate and follow conversation    IV Access:  - None    Nursing Mobility/ADLs:  Walking   Independent  Transfer  Independent  Bathing  Independent  Dressing  Independent  [facility]  Independent  Med Delivery   whole    Wound Care Documentation and Therapy:        Elimination:  Continence: Bowel: Yes  Bladder: at times incontinent  Urinary Catheter: None   Colostomy/Ileostomy/Ileal Conduit: No       Date of Last BM: 3/19/22    Intake/Output Summary (Last 24 hours) at 3/19/2022 1443  Last data filed at 3/19/2022 1348  Gross per 24 hour   Intake 950 ml   Output 0 ml   Net 950 ml     I/O last 3 completed shifts: In: 5 [P.O.:790]  Out: 600 [Urine:600]    Safety Concerns: At Risk for Falls    Impairments/Disabilities:      None    Nutrition Therapy:  Current Nutrition Therapy:   - Oral Diet:  General    Routes of Feeding: Oral  Liquids: No Restrictions  Daily Fluid Restriction: yes - amount 2000  Last Modified Barium Swallow with Video (Video Swallowing Test): not done    Treatments at the Time of Hospital Discharge:   Respiratory Treatments: none  Oxygen Therapy:  is on oxygen at 1 L/min per nasal cannula.   Ventilator:    - No ventilator support    Rehab Therapies: Physical Therapy and Occupational Therapy  Weight

## 2022-03-20 LAB
BLOOD CULTURE, ROUTINE: NORMAL
CULTURE, BLOOD 2: NORMAL

## 2022-03-20 NOTE — PROGRESS NOTES
Physician Progress Note      Justyna Umanzor  CSN #:                  [id number]  :                       1940  ADMIT DATE:       3/16/2022 12:49 PM  [address] DATE:        3/19/2022 6:32 PM  RESPONDING  PROVIDER #:        Heladio Sheriff MD          QUERY TEXT:    Pt admitted with acute respiratory failure. Noted documentation of acute on   chronic systolic heart failure per cardiology consult note. If possible,   please document in progress notes and discharge summary:      The medical record reflects the following:  Risk Factors: ***  Clinical Indicators: Per cardiology consult note \"Acute LV systolic heart   failure/ischemic cardiomyopathy. Recommend discontinuing IVF fluids. Will give   additional 40 mg IV lasix and pending response can likely transition to PO   diuretic tomorrow. Acute hypoxemia resp failure due to pulmonary edema\". Acute   respiratory failure, ProBNP   Treatment: IV Lasix, I&O's, daily weights, cardiology consult, serial labs,   supportive care. Thank you,  [email or web address]  Options provided:  -- Acute on chronic systolic heart failure confirmed present on admission  -- Acute on chronic systolic heart failure ruled out  -- Other - I will add my own diagnosis  -- Disagree - Not applicable / Not valid  -- Disagree - Clinically unable to determine / Unknown  -- Refer to Clinical Documentation Reviewer    PROVIDER RESPONSE TEXT:    The diagnosis of acute on chronic heart failure was confirmed as present on   admission.     Query created by: Champ Armstrong on 3/17/2022 1:50 PM      Electronically signed by:  Heladio Sheriff MD 3/20/2022 4:15 PM

## 2022-03-21 ENCOUNTER — FOLLOWUP TELEPHONE ENCOUNTER (OUTPATIENT)
Dept: TELEMETRY | Age: 82
End: 2022-03-21

## 2022-03-21 NOTE — TELEPHONE ENCOUNTER
1st Attempt; No Answer- Left HIPAA compliant voicemail with Non-Urgent Heart Failure Resource Line number for call back.         Delmi Huitron RN

## 2022-03-22 ENCOUNTER — FOLLOWUP TELEPHONE ENCOUNTER (OUTPATIENT)
Dept: TELEMETRY | Age: 82
End: 2022-03-22

## 2022-03-22 NOTE — TELEPHONE ENCOUNTER
Spoke to patient for hospital follow up. Pt states that she is doing very well. Stated that her daughter is staying with her at night. Denies any current needs.  Tiffanie Balderas RN

## 2022-04-06 NOTE — PROGRESS NOTES
CARDIOLOGY FOLLOW UP        Patient Name: Marjorie Mahajan  Primary Care physician: Luis Alberto Allen DO    Reason for Referral/Chief Complaint: Marjorie Mahajan is a 78 y.o. patient who is here for follow up for hospital follow up from cardiac cath 2/11/2022: PTCA and stenting of diagonal branch #2     History of Present Illness:   Marjorie Mahajan is a 78 y.o. female with a past medical history of transient ischemic attack, hyperlipidemia, hypertension, new atrial fibrillation, Gastric Ulcer, GIB, CHF, breast cancer with prior surgery and radiation of the chest, and mild carotid stenosis. 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 Diagnosed with atrial fibrillation at that time. There is question of whether she was diagnosed with infective endocarditis. In interim, she presented to [facility] ED 2/8/2022 with complaints of chest pain. Transferred to [facility]. Treated for worsening CHF and GIB. She received blood transfusions for hemoglobin of 5.5. She underwent cardiac cath 2/11/2022 with successful PTCA and stenting of diagonal branch #2 with 99% stenosis reduced to 0 and ostial LAD stenosis with from 90 to 20% residual with Dr. Red Jensen. There was a long calcified 70% mid-LAD stenosis beyond the diagonal for which PCI was attempted but was later abandoned due to contrast load and prolonged procedure time and it was therefore managed medically. Mild LV dysfunction by echo. Patient last evaluated 3/9/22, she was having atypical chest pain at night. Though GI related. In interim, patient was admitted 3/16/-3/19/22 for acute respiratory failure and anemia presented with chief complaint of SOB. CXR 3/18/22 showed small left pleural effusion with moderate left basilar atelectasis worsening from 3/16/22 and trace right pleural effusion.   Noted to have bandemia, lactatemia. CT Chest 3/19/22 showed mild pulmonary edema with small to moderate bilateral pleural effusions and bibasilar atelectasis in the lungs. She was evaluated by our service and felt to have element of systolic heart failure. Diuresed and discharged on 40 mg oral Lasix daily. Discharged on 2 L oxygen therapy as well. Today, she presents with a cane. She is supposed to be on home oxygen but she left it in the car. Oxygen sat 98% today on arrival.  Reports she feels the best she has felt in a long time the past week. She weighs herself everyday and weighs 130 pounds everyday without deviation. She denies any clinical blood loss on Eliquis and Plavix. Confirms she is not taking Aspirin. She was recently started on Jakafi 15 mg per hematology and she thinks it is making her feel better. She relates that may be moving because most of her family is in [place], [place]. She is looking at an assisted living facility in the same [facility] area. States she will seek care at [facility] there to establish cardiac and PCP. No recurrence of chest pain. No edema, PND, orthopnea. No limiting dyspnea. Denies palpitations, near-syncope or poonam syncope. Past Medical History:   has a past medical history of Allergic rhinitis, CAD S/P percutaneous coronary angioplasty, Cancer ([address] GI bleed, Glaucoma, Hyperlipidemia, Hypertension, Myelodysplastic syndrome ([address] Radiation therapy complication, and UTI (lower urinary tract infection). Surgical History:   has a past surgical history that includes Cataract removal with implant; Breast surgery (1999); Hysterectomy; knee surgery (2/6/2012); and CT BIOPSY BONE MARROW (3/7/2022). Social History:   reports that she quit smoking about 25 years ago. She has never used smokeless tobacco. She reports that she does not drink alcohol and does not use drugs.      Family History:  Reports no history of early onset coronary artery disease, myocardial infarction, cerebrovascular accident or sudden cardiac death among primary relatives. Home Medications:  Were reviewed and are listed in nursing record and/or below  Prior to Admission medications    Medication Sig Start Date End Date Taking? Authorizing Provider   JAKAFI 15 MG tablet  3/21/22  Yes Historical Provider, MD   lisinopril (PRINIVIL;ZESTRIL) 10 MG tablet Take 1 tablet by mouth daily 3/20/22  Yes Nely Chen MD   metoprolol succinate (TOPROL XL) 25 MG extended release tablet Take 1 tablet by mouth in the morning and at bedtime 3/9/22  Yes Khadra Bentley MD   atorvastatin (LIPITOR) 80 MG tablet Take 1 tablet by mouth nightly 2/15/22  Yes Kenyetta Wilhelm MD   furosemide (LASIX) 40 MG tablet Take 1 tablet by mouth daily 2/15/22  Yes Kenyetta Wilhelm MD   clopidogrel (PLAVIX) 75 MG tablet Take 1 tablet by mouth daily 2/15/22  Yes Kenyetta Wilhelm MD   pantoprazole (PROTONIX) 40 MG tablet Take 1 tablet by mouth 2 times daily (before meals) 2/15/22  Yes Kenyetta Wilhelm MD   sertraline (ZOLOFT) 50 MG tablet TAKE ONE TABLET BY MOUTH DAILY 12/2/21  Yes Historical Provider, MD   allopurinol (ZYLOPRIM) 300 MG tablet Take 300 mg by mouth daily   Yes Historical Provider, MD   apixaban (ELIQUIS) 5 MG TABS tablet Take by mouth 2 times daily   Yes Historical Provider, MD   acetaminophen (TYLENOL) 500 MG tablet Take 500 mg by mouth every 6 hours as needed for Pain   Yes Historical Provider, MD   Brinzolamide-Brimonidine (Marnette Hoops) 1-0.2 % SUSP Apply to eye    Yes Historical Provider, MD   Bimatoprost (LUMIGAN) 0.01 % SOLN Apply 1 drop to eye nightly Each eye    Yes Historical Provider, MD        CURRENT Medications:  No current facility-administered medications for this visit. Allergies:  Oxycodone     Review of Systems:   A 14 point review of symptoms completed. Pertinent positives identified in the HPI, all other review of symptoms negative as below.       Objective:     Vitals: 04/07/22 1233   BP: 120/60   Pulse: 79   Temp: 97.9 °F (36.6 °C)   SpO2: 98%   Weight: 131 lb (59.4 kg)   Height: 5' (1.524 m)      Weight: 131 lb (59.4 kg)       PHYSICAL EXAM:    General:  Alert, cooperative, no distress, appears stated age   Head:  Normocephalic, atraumatic   Eyes:  Conjunctiva/corneas clear, anicteric sclerae    Nose: Nares normal, no drainage or sinus tenderness   Throat: No abnormalities of the lips, oral mucosa or tongue. Neck: Trachea midline. Neck supple with no lymphadenopathy, thyroid not enlarged, symmetric, no tenderness/mass/nodules, no Jugular venous pressure elevation    Lungs:   Clear to auscultation bilaterally, no wheezes, no rales, no respiratory distress   Chest Wall:  No deformity or tenderness to palpation   Heart:  Regular rate and rhythm, normal S1, normal S2, 2/6 systolic ejection murmur at the base/left sternal border with radiation to the carotid, early peaking with preserved A2, no rub, no S3/S4, PMI non-displaced. Abdomen:   Soft, non-tender, with normoactive bowel sounds. No masses, no hepatosplenomegaly   Extremities: No cyanosis, clubbing or significant pitting edema. Vascular: 2+ radial, 2+ dorsalis pedis and posterior tibial pulses bilaterally. Brisk carotid upstrokes without carotid bruit. Skin: Skin color, texture, turgor are normal with no rashes or ulceration. Pysch: Euthymic mood, appropriate affect   Neurologic: Oriented to person, place and time. No slurred speech or facial asymmetry. No motor or sensory deficits on gross examination.          Labs:   CBC:   Lab Results   Component Value Date    WBC 19.6 03/19/2022    RBC 3.23 03/19/2022    HGB 10.2 03/19/2022    HCT 32.1 03/19/2022    MCV 99.3 03/19/2022    RDW 27.1 03/19/2022     03/19/2022       CMP:  Lab Results   Component Value Date     03/19/2022    K 4.2 03/19/2022    K 5.4 03/17/2022    CL 97 03/19/2022    CO2 28 03/19/2022    BUN 15 03/19/2022    CREATININE 0.6 03/19/2022 GFRAA >60 03/19/2022    GFRAA 136 03/12/2012    AGRATIO 1.6 03/16/2022    LABGLOM >60 03/19/2022    GLUCOSE 103 03/19/2022    PROT 6.6 03/16/2022    CALCIUM 9.1 03/19/2022    BILITOT 1.1 03/16/2022    ALKPHOS 198 03/16/2022    AST 12 03/16/2022    ALT 19 03/16/2022     PT/INR:  No results found for: PTINR  HgBA1c:  Lab Results   Component Value Date    LABA1C 5.3 04/17/2021     Lab Results   Component Value Date    TROPONINI 0.02 (H) 03/16/2022     Lab Results   Component Value Date    CHOL 92 02/11/2022    CHOL 110 02/09/2022    CHOL 147 01/23/2014     Lab Results   Component Value Date    TRIG 131 02/11/2022    TRIG 96 02/09/2022    TRIG 246 (H) 01/23/2014     Lab Results   Component Value Date    HDL 34 (L) 02/11/2022    HDL 38 (L) 02/09/2022    HDL 27 (L) 04/17/2021     Lab Results   Component Value Date    LDLCALC 32 02/11/2022    LDLCALC 53 02/09/2022    LDLCALC 50 04/17/2021     Lab Results   Component Value Date    LABVLDL 26 02/11/2022    LABVLDL 19 02/09/2022    LABVLDL 41 04/17/2021     No results found for: CHOLHDLRATIO      Cardiac Data:     EKGs reviewed with my interpretation: Sinus tachycardia 118 bpm, non-specific T wave abnormality. Echo 2/14/2022:   Summary   Left ventricular cavity size is normal. There is mild left ventricular   hypertrophy. Overall left ventricular systolic function appears mildly   reduced with an ejection fraction of 45-50%. There is hypokinesis of the apex   and distal septum /anteroseptum. Diastolic filling parameters suggest grade   II diastolic dysfunction. There is increased LVEDP and LAP. The mitral valve leaflets appear normal in structure. Mitral annular   calcification is present. Mild to moderate mitral regurgitation is present. No evidence of mitral valve stenosis. There is a linear mobile echodensity   off the anterior mitral leaflet (ventricular aspect), likely a torn cord. The left atrium is moderately dilated.    Mild to moderate tricuspid regurgitation. Echo 9/30/2021:  Summary  Left ventricular systolic function is normal with ejection fraction  estimated at 55-60 %. No regional wall motion abnormalities are noted. There is mild concentric left ventricular hypertrophy. Normal left ventricular diastolic filling pressure. Mild anterior and posterior mitral annular calcification is present. Moderate mitral regurgitation. The appears to be a small calcified echogenic mass near LVOT, likely  represents degenerative valve disease, and is less likely a vegetation. Study was compared to 2014 echo and the LVOT mass was not present in 2014. Mild aortic regurgitation is present. Moderate tricuspid regurgitation. Normal systolic pulmonary artery pressure (SPAP) estimated at 32 mmHg (RA  pressure 3 mmHg). Mild pulmonic regurgitation present. 1/23/2014 55% EF, mild MR. Echo 1/2014:   Summary   Normal left ventricle size, wall thickness and systolic function with an   estimated ejection fraction of 55%. No regional wall motion abnormalities   are seen. Grade I diastolic dysfunction is present. Mitral valve is structurally normal.   Mild mitral regurgitation is present. The left atrial size is normal.   The aortic valve is normal in structure and function. No evidence of aortic valve regurgitation. No evidence of aortic stenosis. CARDIAC CATH 2/11/2022  Left main normal.  LAD mid vessel 70% diffuse disease after second diagonal branch. MABEL grade III flow present. Second diagonal branch ostial 99% stenosis mid vessel 99% stenosis diffuse disease in between. Right coronary artery collateralized distal right coronary occluded  Circumflex and obtuse marginal branches with diffuse moderate disease. LVEDP 20 mmHg LVEF estimated at 40% with diffuse hypokinesia more pronounced in the anterior apex.   Conclusion: Successful PTCA and stenting of diagonal branch #2 with 99% stenosis reduced to 0 and ostial stenosis with from 90 to 20% residual.  Plan:  hydration Lasix bedrest for tonight. Resume activity 8 AM on February 12, 2022. Watch platelets and hemoglobin in this patient with GI bleeding and myelodysplastic syndrome. Hold Eliquis and continue Plavix and aspirin 81 mg daily    Additional studies:   CT Chest 3/19/22  Mild pulmonary edema with small to moderate bilateral pleural effusions and bibasilar atelectasis in the lungs. There are borderline mediastinal and right hilar lymph nodes, which are nonspecific. No acute findings in the abdomen and pelvis. Mild soft tissue thickening posterior to the anus and rectum is unchanged from prior study. Correlate with direct visualization if clinically indicated. Peripheral wall calcification of the gallbladder and mild biliary ductal dilatation near the jovani hepatis is also unchanged from February 9, 2022. Impression and Plan:     1. NSTEMI status post PCI diagonal 2/2022, PCI of LAD aborted, medically managed  2. Cardiomyopathy, ischemic, mild LV dysfunction  3.  HFrEF, appears compensated today with maintaining weight around 130 pounds at   4. Paroxysmal atrial fibrillation, maintaining sinus exam/ECG today  5. Mild-moderate MR/TR  6. Mild aortic regurgitation  7. Hypertension - controlled  8.  hyperlipidemia - statin Breast cancer status post surgery and radiation  9. History of transient ischemic attack  10.   Carotid artery disease    [id number] Score for Atrial Fibrillation Stroke Risk   Risk   Factors  Component Value   C CHF Yes 1   H HTN Yes 1   A2 Age >= 76 Yes,  (78 y.o.) 2   D DM No 0   S2 Prior Stroke/TIA Yes 2   V Vascular Disease No 0   A Age 74-69 No,  (78 y.o.) 0   Sc Sex female 1    [id number]  Score  7   Score last updated 9/17/21 6:53 PM EDT    Click here for a link to the UpToDate guideline \"Atrial Fibrillation: Anticoagulation therapy to prevent embolization    Disclaimer: Risk Score calculation is dependent on accuracy of patient problem list and and concerns were addressed to the patient/family. Alternatives to my treatment were discussed. Thank you for allowing us to participate in the care of Wing Valdes. Please call me with any questions [phone].     Sridhar Concepcion MD, [facility]  Cardiovascular Disease  [facility]  [phone] [facility]  [phone] [address] [place]  4/7/2022 12:58 PM

## 2022-04-07 ENCOUNTER — PATIENT MESSAGE (OUTPATIENT)
Dept: CARDIOLOGY CLINIC | Age: 82
End: 2022-04-07

## 2022-04-07 ENCOUNTER — OFFICE VISIT (OUTPATIENT)
Dept: CARDIOLOGY CLINIC | Age: 82
End: 2022-04-07
Payer: MEDICARE

## 2022-04-07 VITALS
TEMPERATURE: 97.9 F | OXYGEN SATURATION: 98 % | WEIGHT: 131 LBS | HEART RATE: 79 BPM | DIASTOLIC BLOOD PRESSURE: 60 MMHG | BODY MASS INDEX: 25.72 KG/M2 | SYSTOLIC BLOOD PRESSURE: 120 MMHG | HEIGHT: 60 IN

## 2022-04-07 DIAGNOSIS — I25.10 CORONARY ARTERY DISEASE INVOLVING NATIVE CORONARY ARTERY OF NATIVE HEART WITHOUT ANGINA PECTORIS: ICD-10-CM

## 2022-04-07 DIAGNOSIS — E78.5 HYPERLIPIDEMIA, UNSPECIFIED HYPERLIPIDEMIA TYPE: ICD-10-CM

## 2022-04-07 DIAGNOSIS — I48.91 ATRIAL FIBRILLATION, UNSPECIFIED TYPE (HCC): ICD-10-CM

## 2022-04-07 DIAGNOSIS — I10 ESSENTIAL HYPERTENSION: Primary | ICD-10-CM

## 2022-04-07 PROCEDURE — 99214 OFFICE O/P EST MOD 30 MIN: CPT | Performed by: INTERNAL MEDICINE

## 2022-04-07 RX ORDER — FUROSEMIDE 40 MG/1
40 TABLET ORAL DAILY
Qty: 90 TABLET | Refills: 3 | Status: SHIPPED | OUTPATIENT
Start: 2022-04-07

## 2022-04-07 RX ORDER — ATORVASTATIN CALCIUM 80 MG/1
80 TABLET, FILM COATED ORAL NIGHTLY
Qty: 90 TABLET | Refills: 3 | Status: SHIPPED | OUTPATIENT
Start: 2022-04-07

## 2022-04-07 RX ORDER — LISINOPRIL 10 MG/1
10 TABLET ORAL DAILY
Qty: 90 TABLET | Refills: 3 | Status: SHIPPED | OUTPATIENT
Start: 2022-04-07

## 2022-04-07 RX ORDER — CLOPIDOGREL BISULFATE 75 MG/1
75 TABLET ORAL DAILY
Qty: 90 TABLET | Refills: 3 | Status: SHIPPED | OUTPATIENT
Start: 2022-04-07

## 2022-04-07 RX ORDER — METOPROLOL SUCCINATE 25 MG/1
25 TABLET, EXTENDED RELEASE ORAL 2 TIMES DAILY
Qty: 180 TABLET | Refills: 3 | Status: SHIPPED | OUTPATIENT
Start: 2022-04-07

## 2022-04-07 RX ORDER — RUXOLITINIB 15 MG/1
TABLET ORAL
COMMUNITY
Start: 2022-03-21

## 2022-04-07 NOTE — PATIENT INSTRUCTIONS
PLAN:   1. Check your home oxygen with a pulse oxygen. Target number is greater than 92%   -you can follow up with your pcp to talk to him about if you still need to be on your oxygen  2. It is ok to hold your morning dose of Lasix if you have a morning appointment   -make sure you take it before 2 pm so you are not up all night. 3. If you weigh yourself and your weight is 135 pounds or higher call my office that same day so we can address it right away  4. Continue with your low sodium diet.       Follow up with me in 3 months

## 2022-04-11 NOTE — TELEPHONE ENCOUNTER
From: Beverley Mccormick  To: Dr. Luis Lara  Sent: 4/7/2022 2:03 PM EDT  Subject: Recommendation     Dr. Arslan Courtney,    Mom(Cordelia Thao) is moving to [facility] in the next 6 weeks. She wanted to know if there was a cardiologist in the [facility] you would recommend, or honestly, avoid. I also wanted to thank you and your amazing staff for the care you gave my parents. I worked in healthcare for 25 years and no one has come close to your expertise, professionalism and most importantly your compassion. You all need to know how important every member of your team is to us and what an impact you made.      God bless you all  Shilpi Hayes

## 2022-04-14 NOTE — TELEPHONE ENCOUNTER
Sohan Marreros you so very much for your kind comments. They truly mean a lot. I have very much enjoyed taking care of you and your family during my brief time here and I wish you well in [place]. I would look up Dr. Ander Rondon at [facility]. He has multiple office locations. He is an interventional cardiologist by trade and would do well by you. He has great rapport with his patients you will find per reviews online. 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 I am certain he will not remember me! But I believe he will take great care of you.   Take care,    Murrosana Polancos

## 2022-05-04 NOTE — PROGRESS NOTES
REFERRING PROVIDER:    Gerard Foreman MD  [address]  [address]  [place] [address]    REASON FOR CONSULTATION:    Myelofibrosis    HISTORY OF PRESENT ILLNESS:  Catherine Boyer is a 82 y.o. female who is referred today for further evaluation of myelofibrosis.    She has been seen by Dr. Gerard Foreman with [facility] specialists and cancer and blood disorders in [place].        She was seen there initially on 6/4/2021 with leukocytosis with a white blood cell count of 104,000.  Hemoglobin was normal at 11.6 and platelets were normal at 299,000.  PCR for BCR/ABL and FISH for BCR/ABL were negative.  A bone marrow aspiration was attempted on 6/9/2021 which was unsuccessful.  She then went to [place] for the summer.  She was admitted to the [facility] between 6/29/2021 and 7/9/2021.  She had a bone marrow aspiration and biopsy performed there on 6/30/2021 showing a hypercellular marrow at greater than 95% with 1% blasts.  Focal 1+ reticulin fibrosis was noted.  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  PCR for BCR/ABL was negative.  JAK2 V617F PCR was positive.  Cytogenetics were normal.  Next generation myeloid disorder profiling of the bone marrow aspirate from 6/30/2021 showed TET2 V4400E and N9224Bor*47 abnormalities and JAK2 V617F.  Therefore she was suspected to have early primary myelofibrosis and she was started on hydroxyurea 1000 mg daily.  Subsequently, hydroxyurea was held.  Blood counts had improved.  She was admitted to the hospital from 2/9 through 2/15/2022 for symptomatic anemia and chest pain.  Her hemoglobin was 5.5.  The white blood cell count was 7.6.  Platelets were 56,000.  Fecal occult blood testing was negative.  She received 3 units of packed red blood cells.  No endoscopy was performed.  A left heart catheterization was performed with a drug-eluting stent placed to the second diagonal on 2/11/2022 and she was discharged from that hospitalization on aspirin  81 mg, Plavix 75 mg, and Eliquis 5 mg twice daily.  Blood counts improved by 3/7/2022 and her white blood cell count was 7.2 with a hemoglobin 11.4 and platelets 295,000.  She did have a bone marrow aspiration and biopsy on 3/7/2022 showing chronic myeloproliferative neoplasm with potentially post ET fibrosis or consideration of primary myelofibrosis.  There was no evidence for myelodysplasia.    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  She also has atrial fibrillation and is anticoagulated with Eliquis.      Hydroxyurea was discontinued and she was started on ruxolitinib 15 mg twice daily at her visit on 3/14/2022 with Dr. Gerard Foreman.    She has now moved from [place] to [place].  She is in assisted living.  She has been on the ruxolitinib for about 3 weeks.  She states that she tolerates it well.  Energy level is improving.  She states that she feels well.  Her mood is improving.  She denies any adverse effects from the medication.  No abdominal pain.  No nausea or vomiting.  She denies any bleeding.  She continues Eliquis and Plavix.      Past Medical History:   Diagnosis Date   • Cancer (HCC)    • Diabetes mellitus (HCC)    • Glaucoma    • History of cataract        Past Surgical History:   Procedure Laterality Date   • BREAST LUMPECTOMY  1999   • CATARACT EXTRACTION  2011       SOCIAL HISTORY:   reports that she has quit smoking. She has never used smokeless tobacco. She reports that she does not drink alcohol. No history on file for drug use.    FAMILY HISTORY:  family history includes Cancer in her father and mother.    ALLERGIES:  Allergies   Allergen Reactions   • Oxycodone Unknown - Low Severity       MEDICATIONS:  The medication list has been reviewed with the patient by the medical assistant, and the list has been updated in the electronic medical record, which I reviewed.  Medication dosages and frequencies  "were confirmed to be accurate.    Review of Systems   Constitutional: Positive for fatigue (Improving).   Gastrointestinal: Negative for blood in stool.   All other systems reviewed and are negative.      Vitals:    05/05/22 1008   BP: 106/64   Pulse: 68   Resp: 16   Temp: 96.6 °F (35.9 °C)   TempSrc: Temporal   SpO2: 96%   Weight: 61.5 kg (135 lb 9.6 oz)   Height: 154.5 cm (60.83\")   PainSc: 0-No pain  Comment: Myelofibrosis       Physical Exam  Vitals reviewed.   Constitutional:       Appearance: She is well-developed.      Comments: Walks with a cane   HENT:      Head: Normocephalic and atraumatic.      Comments: Wearing facemask  Eyes:      Conjunctiva/sclera: Conjunctivae normal.      Pupils: Pupils are equal, round, and reactive to light.   Cardiovascular:      Rate and Rhythm: Normal rate and regular rhythm.      Heart sounds: S1 normal and S2 normal. Murmur heard.    Systolic murmur is present with a grade of 2/6.    No friction rub. No gallop.   Pulmonary:      Effort: Pulmonary effort is normal. No respiratory distress.      Breath sounds: Normal breath sounds. No stridor. No wheezing, rhonchi or rales.   Chest:      Chest wall: No tenderness.   Breasts:      Right: No supraclavicular adenopathy.      Left: No supraclavicular adenopathy.       Abdominal:      General: Bowel sounds are normal. There is no distension.      Palpations: Abdomen is soft. There is no mass.      Tenderness: There is no abdominal tenderness. There is no guarding or rebound.   Musculoskeletal:         General: Normal range of motion.      Cervical back: Neck supple.   Lymphadenopathy:      Cervical: No cervical adenopathy.      Upper Body:      Right upper body: No supraclavicular adenopathy.      Left upper body: No supraclavicular adenopathy.   Skin:     General: Skin is warm and dry.      Findings: No erythema or rash.   Neurological:      Mental Status: She is alert and oriented to person, place, and time.      Cranial Nerves: " No cranial nerve deficit.      Sensory: No sensory deficit.   Psychiatric:         Behavior: Behavior normal.         Thought Content: Thought content normal.         Judgment: Judgment normal.         DIAGNOSTIC DATA:  CBC & Differential (05/05/2022 10:12)      IMAGING:    None reviewed    ASSESSMENT:  This is a 82 y.o. female with:    *JAK2 positive myeloproliferative disorder, perhaps primary myelofibrosis  · She has been seen by Dr. eGrard Foreman with [facility] specialists and cancer and blood disorders in [place].      · She was seen there initially on 6/4/2021 with leukocytosis with a white blood cell count of 104,000.  Hemoglobin was normal at 11.6 and platelets were normal at 299,000.  PCR for BCR/ABL and FISH for BCR/ABL were negative.  A bone marrow aspiration was attempted on 6/9/2021 which was unsuccessful.  She then went to [place] for the summer.  She was admitted to the [facility] between 6/29/2021 and 7/9/2021.  She had a bone marrow aspiration and biopsy performed there on 6/30/2021 showing a hypercellular marrow at greater than 95% with 1% blasts.  Focal 1+ reticulin fibrosis was noted.  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  PCR for BCR/ABL was negative.  JAK2 V617F PCR was positive.  Cytogenetics were normal.  Next generation myeloid disorder profiling of the bone marrow aspirate from 6/30/2021 showed TET2 O6002C and [id number]*47 abnormalities and JAK2 V617F.  Therefore she was suspected to have early primary myelofibrosis and she was started on hydroxyurea 1000 mg daily.  Subsequently, hydroxyurea was held.  Blood counts had improved.  She was admitted to the hospital from 2/9 through 2/15/2022 for symptomatic anemia and chest pain.  Her hemoglobin was 5.5.  The white blood cell count was 7.6.  Platelets were 56,000.  Fecal occult blood testing was negative.  She received 3 units of packed red blood cells.  No endoscopy was performed.  A left heart catheterization was  performed with a drug-eluting stent placed to the second diagonal on 2/11/2022 and she was discharged from that hospitalization on aspirin 81 mg, Plavix 75 mg, and Eliquis 5 mg twice daily.  Blood counts improved by 3/7/2022 and her white blood cell count was 7.2 with a hemoglobin 11.4 and platelets 295,000.  She did have a bone marrow aspiration and biopsy on 3/7/2022 showing chronic myeloproliferative neoplasm with potentially post ET fibrosis or consideration of primary myelofibrosis.  There was no evidence for myelodysplasia.  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  She also has atrial fibrillation and is anticoagulated with Eliquis.    · Hydroxyurea was discontinued and she was started on ruxolitinib 15 mg twice daily at her visit on 3/14/2022 with Dr. Gerard Foreman.  · She had CT imaging of the chest abdomen pelvis on 3/19/2022 that did not demonstrate any splenomegaly.  Calcified mediastinal lymphadenopathy was noted.  Borderline pretracheal and right hilar lymphadenopathy noted.  Small bilateral pleural effusions with bibasilar atelectasis noted.  · She has moved from [place] to [place] and is seen initially in the office on 5/15/2022.  She tolerates ruxolitinib well.  White blood cell count has decreased from 5.9 from 19.6.  Platelets have normalized at 170,000, down from 464,000.  The hemoglobin has also decreased at 8.6 with an MCV of 98.9.    *Microcytic anemia  • Hemoglobin is decreasing, perhaps due to ruxolitinib  • Laboratory evaluation today    *History of marantic endocarditis and atrial fibrillation  · Anticoagulated with Eliquis 5 mg twice daily    PLAN:   1. Continue ruxolitinib 15 mg twice daily.  I will notify her pharmacy staff that we will be taking care of this medication at this point.  Depending on her blood counts she may require dose reduction.  2. Laboratory anemia evaluation today   3. She  continues Eliquis 5 mg twice daily as well as Plavix 75 mg daily.  4. I did refill her Eliquis and allopurinol prescriptions today.    5. Follow-up with me in a few weeks with labs for close monitoring.  I advised her and her daughter to call us with any concerns prior to that.    I spent 60 minutes in this visit today reviewing her record, communicating with her and her daughter, communicating with staff, examining her, placing orders, documenting the encounter.    ORDERS PLACED DURING THIS ENCOUNTER  Orders Placed This Encounter   Procedures   • Comprehensive Metabolic Panel     Standing Status:   Future     Standing Expiration Date:   5/5/2023     Order Specific Question:   Release to patient     Answer:   Immediate   • Retic With IRF & RET-He     Standing Status:   Future     Standing Expiration Date:   5/6/2023     Order Specific Question:   Release to patient     Answer:   Immediate   • Ferritin     Order Specific Question:   Is the patient on iron therapy?     Answer:   No     Order Specific Question:   Release to patient     Answer:   Immediate   • Iron Profile     Order Specific Question:   Is the patient on iron therapy?     Answer:   No     Order Specific Question:   Release to patient     Answer:   Immediate   • Retic With IRF & RET-He     Order Specific Question:   Release to patient     Answer:   Immediate   • Vitamin B12     Order Specific Question:   Release to patient     Answer:   Immediate   • Folate RBC     Order Specific Question:   Release to patient     Answer:   Immediate   • Folate     Order Specific Question:   Release to patient     Answer:   Immediate   • Comprehensive Metabolic Panel     Order Specific Question:   Release to patient     Answer:   Immediate   • Lactate Dehydrogenase     Order Specific Question:   Release to patient     Answer:   Immediate   • Uric Acid     Order Specific Question:   Release to patient     Answer:   Immediate   • CBC & Differential     Standing Status:    Future     Number of Occurrences:   1     Standing Expiration Date:   5/4/2023     Order Specific Question:   Manual Differential     Answer:   No   • CBC & Differential     Standing Status:   Future     Standing Expiration Date:   5/5/2023     Order Specific Question:   Manual Differential     Answer:   No

## 2022-05-05 ENCOUNTER — CONSULT (OUTPATIENT)
Dept: ONCOLOGY | Facility: CLINIC | Age: 82
End: 2022-05-05

## 2022-05-05 ENCOUNTER — LAB (OUTPATIENT)
Dept: LAB | Facility: HOSPITAL | Age: 82
End: 2022-05-05

## 2022-05-05 ENCOUNTER — DOCUMENTATION (OUTPATIENT)
Dept: ONCOLOGY | Facility: CLINIC | Age: 82
End: 2022-05-05

## 2022-05-05 VITALS
HEART RATE: 68 BPM | TEMPERATURE: 96.6 F | HEIGHT: 61 IN | OXYGEN SATURATION: 96 % | BODY MASS INDEX: 25.6 KG/M2 | WEIGHT: 135.6 LBS | SYSTOLIC BLOOD PRESSURE: 106 MMHG | DIASTOLIC BLOOD PRESSURE: 64 MMHG | RESPIRATION RATE: 16 BRPM

## 2022-05-05 DIAGNOSIS — D47.4 OSTEOMYELOFIBROSIS: ICD-10-CM

## 2022-05-05 DIAGNOSIS — D53.9 MACROCYTIC ANEMIA: ICD-10-CM

## 2022-05-05 DIAGNOSIS — D75.81 MYELOFIBROSIS: Primary | ICD-10-CM

## 2022-05-05 LAB
ALBUMIN SERPL-MCNC: 4.4 G/DL (ref 3.5–5.2)
ALBUMIN/GLOB SERPL: 1.9 G/DL (ref 1.1–2.4)
ALP SERPL-CCNC: 148 U/L (ref 38–116)
ALT SERPL W P-5'-P-CCNC: 29 U/L (ref 0–33)
ANION GAP SERPL CALCULATED.3IONS-SCNC: 9.5 MMOL/L (ref 5–15)
AST SERPL-CCNC: 17 U/L (ref 0–32)
BASOPHILS # BLD AUTO: 0.14 10*3/MM3 (ref 0–0.2)
BASOPHILS NFR BLD AUTO: 2.4 % (ref 0–1.5)
BILIRUB SERPL-MCNC: 0.4 MG/DL (ref 0.2–1.2)
BUN SERPL-MCNC: 26 MG/DL (ref 6–20)
BUN/CREAT SERPL: 30.2 (ref 7.3–30)
CALCIUM SPEC-SCNC: 9.4 MG/DL (ref 8.5–10.2)
CHLORIDE SERPL-SCNC: 106 MMOL/L (ref 98–107)
CO2 SERPL-SCNC: 25.5 MMOL/L (ref 22–29)
CREAT SERPL-MCNC: 0.86 MG/DL (ref 0.6–1.1)
DEPRECATED RDW RBC AUTO: 77.6 FL (ref 37–54)
EGFRCR SERPLBLD CKD-EPI 2021: 66.7 ML/MIN/1.73
EOSINOPHIL # BLD AUTO: 0.41 10*3/MM3 (ref 0–0.4)
EOSINOPHIL NFR BLD AUTO: 6.9 % (ref 0.3–6.2)
ERYTHROCYTE [DISTWIDTH] IN BLOOD BY AUTOMATED COUNT: 21.7 % (ref 12.3–15.4)
FERRITIN SERPL-MCNC: 242.4 NG/ML (ref 13–150)
FOLATE SERPL-MCNC: 15.1 NG/ML (ref 4.78–24.2)
GLOBULIN UR ELPH-MCNC: 2.3 GM/DL (ref 1.8–3.5)
GLUCOSE SERPL-MCNC: 160 MG/DL (ref 74–124)
HCT VFR BLD AUTO: 26.1 % (ref 34–46.6)
HGB BLD-MCNC: 8.6 G/DL (ref 12–15.9)
HGB RETIC QN AUTO: 33.5 PG (ref 29.8–36.1)
IMM GRANULOCYTES # BLD AUTO: 0.18 10*3/MM3 (ref 0–0.05)
IMM GRANULOCYTES NFR BLD AUTO: 3.1 % (ref 0–0.5)
IMM RETICS NFR: 31.5 % (ref 3–15.8)
IRON 24H UR-MRATE: 105 MCG/DL (ref 37–145)
IRON SATN MFR SERPL: 33 % (ref 14–48)
LDH SERPL-CCNC: 167 U/L (ref 99–259)
LYMPHOCYTES # BLD AUTO: 1.98 10*3/MM3 (ref 0.7–3.1)
LYMPHOCYTES NFR BLD AUTO: 33.6 % (ref 19.6–45.3)
MCH RBC QN AUTO: 32.6 PG (ref 26.6–33)
MCHC RBC AUTO-ENTMCNC: 33 G/DL (ref 31.5–35.7)
MCV RBC AUTO: 98.9 FL (ref 79–97)
MONOCYTES # BLD AUTO: 0.87 10*3/MM3 (ref 0.1–0.9)
MONOCYTES NFR BLD AUTO: 14.7 % (ref 5–12)
NEUTROPHILS NFR BLD AUTO: 2.32 10*3/MM3 (ref 1.7–7)
NEUTROPHILS NFR BLD AUTO: 39.3 % (ref 42.7–76)
NRBC BLD AUTO-RTO: 0 /100 WBC (ref 0–0.2)
PLATELET # BLD AUTO: 170 10*3/MM3 (ref 140–450)
PMV BLD AUTO: 10.6 FL (ref 6–12)
POTASSIUM SERPL-SCNC: 3.7 MMOL/L (ref 3.5–4.7)
PROT SERPL-MCNC: 6.7 G/DL (ref 6.3–8)
RBC # BLD AUTO: 2.64 10*6/MM3 (ref 3.77–5.28)
RETICS # AUTO: 0.05 10*6/MM3 (ref 0.02–0.13)
RETICS/RBC NFR AUTO: 1.76 % (ref 0.7–1.9)
SODIUM SERPL-SCNC: 141 MMOL/L (ref 134–145)
TIBC SERPL-MCNC: 318 MCG/DL (ref 249–505)
TRANSFERRIN SERPL-MCNC: 227 MG/DL (ref 200–360)
URATE SERPL-MCNC: 4.8 MG/DL (ref 2.8–7.4)
VIT B12 BLD-MCNC: 1107 PG/ML (ref 211–946)
WBC NRBC COR # BLD: 5.9 10*3/MM3 (ref 3.4–10.8)

## 2022-05-05 PROCEDURE — 84550 ASSAY OF BLOOD/URIC ACID: CPT | Performed by: INTERNAL MEDICINE

## 2022-05-05 PROCEDURE — 82746 ASSAY OF FOLIC ACID SERUM: CPT | Performed by: INTERNAL MEDICINE

## 2022-05-05 PROCEDURE — 85046 RETICYTE/HGB CONCENTRATE: CPT | Performed by: INTERNAL MEDICINE

## 2022-05-05 PROCEDURE — 82728 ASSAY OF FERRITIN: CPT | Performed by: INTERNAL MEDICINE

## 2022-05-05 PROCEDURE — 84466 ASSAY OF TRANSFERRIN: CPT | Performed by: INTERNAL MEDICINE

## 2022-05-05 PROCEDURE — 85025 COMPLETE CBC W/AUTO DIFF WBC: CPT

## 2022-05-05 PROCEDURE — 36415 COLL VENOUS BLD VENIPUNCTURE: CPT

## 2022-05-05 PROCEDURE — 83615 LACTATE (LD) (LDH) ENZYME: CPT | Performed by: INTERNAL MEDICINE

## 2022-05-05 PROCEDURE — 80053 COMPREHEN METABOLIC PANEL: CPT | Performed by: INTERNAL MEDICINE

## 2022-05-05 PROCEDURE — 82607 VITAMIN B-12: CPT | Performed by: INTERNAL MEDICINE

## 2022-05-05 PROCEDURE — 83540 ASSAY OF IRON: CPT | Performed by: INTERNAL MEDICINE

## 2022-05-05 PROCEDURE — 99205 OFFICE O/P NEW HI 60 MIN: CPT | Performed by: INTERNAL MEDICINE

## 2022-05-05 RX ORDER — ASPIRIN 81 MG/1
TABLET, CHEWABLE ORAL
COMMUNITY
Start: 2022-02-15 | End: 2022-05-05 | Stop reason: SDDI

## 2022-05-05 RX ORDER — RUXOLITINIB 15 MG/1
TABLET ORAL
COMMUNITY
Start: 2022-04-18 | End: 2022-05-16 | Stop reason: SDUPTHER

## 2022-05-05 RX ORDER — PANTOPRAZOLE SODIUM 40 MG/1
40 TABLET, DELAYED RELEASE ORAL DAILY
COMMUNITY
Start: 2022-04-14 | End: 2023-02-15 | Stop reason: SDUPTHER

## 2022-05-05 RX ORDER — ACETAMINOPHEN 500 MG
500 TABLET ORAL AS NEEDED
COMMUNITY

## 2022-05-05 RX ORDER — LATANOPROST 50 UG/ML
1 SOLUTION/ DROPS OPHTHALMIC
COMMUNITY
Start: 2022-03-14

## 2022-05-05 RX ORDER — ALLOPURINOL 300 MG/1
TABLET ORAL
COMMUNITY
Start: 2022-04-26 | End: 2022-05-05 | Stop reason: SDUPTHER

## 2022-05-05 RX ORDER — POTASSIUM CHLORIDE 750 MG/1
TABLET, FILM COATED, EXTENDED RELEASE ORAL
COMMUNITY
End: 2022-05-05 | Stop reason: SDDI

## 2022-05-05 RX ORDER — ALLOPURINOL 300 MG/1
300 TABLET ORAL DAILY
Qty: 30 TABLET | Refills: 5 | Status: SHIPPED | OUTPATIENT
Start: 2022-05-05 | End: 2022-05-26 | Stop reason: SDUPTHER

## 2022-05-05 RX ORDER — NITROFURANTOIN 25; 75 MG/1; MG/1
CAPSULE ORAL
COMMUNITY
Start: 2022-04-23 | End: 2022-05-05 | Stop reason: SDDI

## 2022-05-05 RX ORDER — HYDROXYUREA 500 MG/1
1 CAPSULE ORAL
COMMUNITY
Start: 2021-10-18 | End: 2022-05-05 | Stop reason: SDDI

## 2022-05-05 RX ORDER — PAROXETINE 10 MG/1
40 TABLET, FILM COATED ORAL
COMMUNITY
End: 2022-05-05 | Stop reason: SDDI

## 2022-05-05 RX ORDER — LISINOPRIL 2.5 MG/1
TABLET ORAL
COMMUNITY
Start: 2022-03-14 | End: 2022-05-05 | Stop reason: DRUGHIGH

## 2022-05-05 RX ORDER — ATORVASTATIN CALCIUM 80 MG/1
TABLET, FILM COATED ORAL
COMMUNITY
Start: 2022-03-14 | End: 2022-05-26 | Stop reason: SDUPTHER

## 2022-05-05 RX ORDER — NIFEDIPINE 30 MG/1
TABLET, EXTENDED RELEASE ORAL
COMMUNITY
Start: 2022-02-03 | End: 2022-05-26 | Stop reason: SDUPTHER

## 2022-05-05 RX ORDER — FUROSEMIDE 40 MG/1
40 TABLET ORAL DAILY
COMMUNITY
Start: 2022-04-13 | End: 2022-07-20 | Stop reason: SDUPTHER

## 2022-05-05 RX ORDER — PHENOL 1.4 %
1 AEROSOL, SPRAY (ML) MUCOUS MEMBRANE
COMMUNITY
End: 2022-05-26

## 2022-05-05 RX ORDER — ROPINIROLE 0.5 MG/1
0.5 TABLET, FILM COATED ORAL
COMMUNITY
End: 2022-05-05 | Stop reason: SDDI

## 2022-05-05 RX ORDER — CLOPIDOGREL BISULFATE 75 MG/1
TABLET ORAL
COMMUNITY
Start: 2022-03-14 | End: 2022-05-26 | Stop reason: SDUPTHER

## 2022-05-05 RX ORDER — LISINOPRIL 10 MG/1
TABLET ORAL
COMMUNITY
Start: 2022-04-20 | End: 2022-05-26 | Stop reason: SDUPTHER

## 2022-05-05 RX ORDER — FUROSEMIDE 20 MG/1
1 TABLET ORAL
COMMUNITY
End: 2022-05-05 | Stop reason: DRUGHIGH

## 2022-05-05 NOTE — PROGRESS NOTES
Staff message rec from Dr Reinoso-pt is on Jakafi through [place]. She is transferring her care to our office. Dr Reinoso states the dose of Jakafi may change and this is to be determined.    Darrell Reinoso MD sent to [facility] Oral Onc Pool  Cc: Isi Mao.  She is on ruxolitinib 15 mg twice daily as prescribed in [place].  She is transferring her care to our practice.  At this point I anticipate continuing at this dose but she may require dose reduction based on blood counts.  To be determined.  I will see her back in a few weeks.  Thanks, AJ       I attempted to submit a test claim to see if [facility] will be able to fill for pt and rec the following rejection:    NEXT RFL [id number]  DAYS TO RFL 5  LAST FILL [id number] VIA RETAIL     I will continue to follow and get pts medication transferred from [place] pharmacy.    Isi Mao  Specialty Pharmacy Technician

## 2022-05-06 LAB
FOLATE BLD-MCNC: 408 NG/ML
FOLATE RBC-MCNC: 1588 NG/ML
HCT VFR BLD AUTO: 25.7 % (ref 34–46.6)

## 2022-05-11 NOTE — TELEPHONE ENCOUNTER
Caller: RAY    Relationship: Emergency Contact    Requested Prescriptions:   Requested Prescriptions     Pending Prescriptions Disp Refills   • sertraline (ZOLOFT) 50 MG tablet          Pharmacy where request should be sent:  [facility]    Additional details provided by patient: N/A    Does the patient have less than a 3 day supply:  [x] Yes  [] No    Larry CERVANTES Rep   05/11/22 13:18 EDT

## 2022-05-13 NOTE — TELEPHONE ENCOUNTER
Caller: RAY REYES    Relationship: DAUGHTER    Best call back number: [phone]    Requested Prescriptions:   Requested Prescriptions     Pending Prescriptions Disp Refills   • sertraline (ZOLOFT) 50 MG tablet          Pharmacy where request should be sent: [facility] [place] [address] [facility] [address]  - [phone]  - [phone] FX     Additional details provided by patient: STATES PT WAS TOLD YESTERDAY A REFILL WOULD BE SENT IN, BUT PT'S PHARMACY STATES THEY HAVE NOT RECEIVED A REFILL YET. PT WILL BE OUT AFTER TODAY    Does the patient have less than a 3 day supply:  [x] Yes  [] No

## 2022-05-16 DIAGNOSIS — D75.81 MYELOFIBROSIS: Primary | ICD-10-CM

## 2022-05-16 NOTE — TELEPHONE ENCOUNTER
Caller: RAY    Relationship:     Requested Prescriptions:   Requested Prescriptions     Pending Prescriptions Disp Refills   • Jakafi 15 MG chemo tablet 60 tablet         Pharmacy where request should be sent:  [facility]     Additional details provided by patient: N/A    Does the patient have less than a 3 day supply:  [] Yes  [x] No    Larry CERVANTES Rep   05/16/22 14:36 EDT

## 2022-05-17 RX ORDER — RUXOLITINIB 15 MG/1
15 TABLET ORAL 2 TIMES DAILY
Qty: 60 TABLET | Refills: 3 | Status: SHIPPED | OUTPATIENT
Start: 2022-05-17 | End: 2022-09-28 | Stop reason: SDUPTHER

## 2022-05-18 ENCOUNTER — DOCUMENTATION (OUTPATIENT)
Dept: ONCOLOGY | Facility: CLINIC | Age: 82
End: 2022-05-18

## 2022-05-18 ENCOUNTER — SPECIALTY PHARMACY (OUTPATIENT)
Dept: ONCOLOGY | Facility: CLINIC | Age: 82
End: 2022-05-18

## 2022-05-18 NOTE — PROGRESS NOTES
Pt's daughter, Ila, called the [facility] requesting a refill of pts Jakafi-the pharmacy listed was [facility].     The rx was sent to [facility] (see previous note-she is transferring her care to our office). Her copay is $0 with insurance.    I attempted to contact Ila to coordinate the delivery and explain the process going forward but had to leave a VM. My direct number and office hours were left in the message.     Isi Mao  Specialty Pharmacy Technician

## 2022-05-23 NOTE — PROGRESS NOTES
"[facility] GROUP OUTPATIENT FOLLOW UP CLINIC VISIT    REASON FOR FOLLOW-UP:    Myelofibrosis    HISTORY OF PRESENT ILLNESS:  Catherine Boyer is a 82 y.o. female who returns today for follow up of the above issue.      She is really doing well at this point.  She is eating well.  Energy level is excellent.  She seems to be thriving.  She tolerates the Jakafi very well without adverse effects.  She denies any bleeding.    REVIEW OF SYSTEMS:  As per HPI    PHYSICAL EXAMINATION:    Vitals:    05/26/22 0831   BP: 145/76   Pulse: 71   Resp: 16   Temp: 97.3 °F (36.3 °C)   TempSrc: Temporal   SpO2: 97%   Weight: 63.6 kg (140 lb 4.8 oz)   Height: 154.5 cm (60.83\")   PainSc: 0-No pain  Comment: Myelofibrosis       GENERAL:  Well-developed well-nourished female; awake, alert and oriented, in no acute distress.  SKIN:  Warm and dry, without rashes, purpura, or petechiae.  HEAD:  Normocephalic, atraumatic. Wearing a facemask.  EARS:  Hearing intact.  LYMPHATICS:  No cervical, supraclavicular, or axillary lymphadenopathy.  CHEST:  Lungs are clear to auscultation bilaterally.  No wheezes, rales, or rhonchi.  HEART:  Regular rate; normal rhythm.  No murmurs, gallops or rubs.  ABDOMEN:  Soft, non-tender, non-distended.  Normal active bowel sounds.  No organomegaly.  EXTREMITIES:  No clubbing cyanosis or edema.  NEUROLOGICAL:  No focal neurologic deficits.    DIAGNOSTIC DATA:  Retic With IRF & RET-He (05/26/2022 08:32)  CBC & Differential (05/26/2022 08:32)      IMAGING:    None reviewed    ASSESSMENT:  This is a 82 y.o. female with:    *JAK2 positive myeloproliferative disorder, perhaps primary myelofibrosis  · She has been seen by Dr. Gerard Foreman with [facility] specialists and cancer and blood disorders in [place].      · She was seen there initially on 6/4/2021 with leukocytosis with a white blood cell count of 104,000.  Hemoglobin was normal at 11.6 and platelets were normal at 299,000.  PCR for BCR/ABL and FISH for BCR/ABL " were negative.  A bone marrow aspiration was attempted on 6/9/2021 which was unsuccessful.  She then went to [place] for the summer.  She was admitted to the [facility] between 6/29/2021 and 7/9/2021.  She had a bone marrow aspiration and biopsy performed there on 6/30/2021 showing a hypercellular marrow at greater than 95% with 1% blasts.  Focal 1+ reticulin fibrosis was noted.  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  PCR for BCR/ABL was negative.  JAK2 V617F PCR was positive.  Cytogenetics were normal.  Next generation myeloid disorder profiling of the bone marrow aspirate from 6/30/2021 showed TET2 F4943V and L0420Iuz*47 abnormalities and JAK2 V617F.  Therefore she was suspected to have early primary myelofibrosis and she was started on hydroxyurea 1000 mg daily.  Subsequently, hydroxyurea was held.  Blood counts had improved.  She was admitted to the hospital from 2/9 through 2/15/2022 for symptomatic anemia and chest pain.  Her hemoglobin was 5.5.  The white blood cell count was 7.6.  Platelets were 56,000.  Fecal occult blood testing was negative.  She received 3 units of packed red blood cells.  No endoscopy was performed.  A left heart catheterization was performed with a drug-eluting stent placed to the second diagonal on 2/11/2022 and she was discharged from that hospitalization on aspirin 81 mg, Plavix 75 mg, and Eliquis 5 mg twice daily.  Blood counts improved by 3/7/2022 and her white blood cell count was 7.2 with a hemoglobin 11.4 and platelets 295,000.  She did have a bone marrow aspiration and biopsy on 3/7/2022 showing chronic myeloproliferative neoplasm with potentially post ET fibrosis or consideration of primary myelofibrosis.  There was no evidence for myelodysplasia.  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  She also has atrial fibrillation and is anticoagulated with  Eliquis.    · Hydroxyurea was discontinued and she was started on ruxolitinib 15 mg twice daily at her visit on 3/14/2022 with Dr. Gerard Foreman.  · She had CT imaging of the chest abdomen pelvis on 3/19/2022 that did not demonstrate any splenomegaly.  Calcified mediastinal lymphadenopathy was noted.  Borderline pretracheal and right hilar lymphadenopathy noted.  Small bilateral pleural effusions with bibasilar atelectasis noted.  · She has moved from [place] to [place] and is seen initially in the office on 5/15/2022.  She tolerates ruxolitinib well.  White blood cell count has decreased from 5.9 from 19.6.  Platelets have normalized at 170,000, down from 464,000.  The hemoglobin has also decreased at 8.6 with an MCV of 98.9.  · 5/26/2022: White blood cell count mildly elevated at 12.4 with a normal platelet count at 216,000.  She tolerates Jakafi very well.      *Microcytic anemia  · Hemoglobin on 5/5/2022 was declining at 8.6.  · Ferritin 242 with an iron of 105 and 33% iron saturation, ferritin vitamin B12 1107, folic acid 15.1, normal RBC folate, creatinine 0.86,   · 5/26/2022: Hemoglobin stable at 8.5.  The reticulocyte count is a little bit more elevated.  I am hopeful that the hemoglobin will improve.  If it does not we will need to consider Procrit.     *History of marantic endocarditis and atrial fibrillation  · Anticoagulated with Eliquis 5 mg twice daily     PLAN:   1. Continue ruxolitinib 15 mg twice daily.  We are prescribing this at this point.  2. She continues Eliquis 5 mg twice daily as well as Plavix 75 mg daily.  3. Refer to cardiology for management of her cardiac issues which include coronary artery disease, mild systolic heart failure, and history of endocarditis and atrial fibrillation.  4. I will see her back in 5 weeks with labs    High risk medication requiring intensive monitoring

## 2022-05-25 ENCOUNTER — DOCUMENTATION (OUTPATIENT)
Dept: ONCOLOGY | Facility: CLINIC | Age: 82
End: 2022-05-25

## 2022-05-25 NOTE — PROGRESS NOTES
Several outbound calls made to connect with pts daughter's Ila or Karen (her umber is listed as the preferred mobile for pt). I have had to leave message and have rec no return calls.     Pt is scheduled to see Dr Reinoso and MTM team on 5/26/22. Jakafi delivery can be coordinated at that time.    Isi Mao  Specialty Pharmacy Technician

## 2022-05-26 ENCOUNTER — SPECIALTY PHARMACY (OUTPATIENT)
Dept: ONCOLOGY | Facility: HOSPITAL | Age: 82
End: 2022-05-26

## 2022-05-26 ENCOUNTER — SPECIALTY PHARMACY (OUTPATIENT)
Dept: ONCOLOGY | Facility: CLINIC | Age: 82
End: 2022-05-26

## 2022-05-26 ENCOUNTER — OFFICE VISIT (OUTPATIENT)
Dept: ONCOLOGY | Facility: CLINIC | Age: 82
End: 2022-05-26

## 2022-05-26 ENCOUNTER — LAB (OUTPATIENT)
Dept: LAB | Facility: HOSPITAL | Age: 82
End: 2022-05-26

## 2022-05-26 VITALS
HEART RATE: 71 BPM | RESPIRATION RATE: 16 BRPM | WEIGHT: 140.3 LBS | TEMPERATURE: 97.3 F | BODY MASS INDEX: 26.49 KG/M2 | SYSTOLIC BLOOD PRESSURE: 145 MMHG | HEIGHT: 61 IN | OXYGEN SATURATION: 97 % | DIASTOLIC BLOOD PRESSURE: 76 MMHG

## 2022-05-26 DIAGNOSIS — I50.22 CHRONIC SYSTOLIC HEART FAILURE: Primary | ICD-10-CM

## 2022-05-26 DIAGNOSIS — D75.81 MYELOFIBROSIS: ICD-10-CM

## 2022-05-26 DIAGNOSIS — D53.9 MACROCYTIC ANEMIA: ICD-10-CM

## 2022-05-26 DIAGNOSIS — I25.10 CORONARY ARTERY DISEASE INVOLVING NATIVE HEART WITHOUT ANGINA PECTORIS, UNSPECIFIED VESSEL OR LESION TYPE: ICD-10-CM

## 2022-05-26 LAB
ALBUMIN SERPL-MCNC: 4.6 G/DL (ref 3.5–5.2)
ALBUMIN/GLOB SERPL: 1.9 G/DL (ref 1.1–2.4)
ALP SERPL-CCNC: 114 U/L (ref 38–116)
ALT SERPL W P-5'-P-CCNC: 13 U/L (ref 0–33)
ANION GAP SERPL CALCULATED.3IONS-SCNC: 8.6 MMOL/L (ref 5–15)
AST SERPL-CCNC: 11 U/L (ref 0–32)
BASOPHILS # BLD AUTO: 0.08 10*3/MM3 (ref 0–0.2)
BASOPHILS NFR BLD AUTO: 0.6 % (ref 0–1.5)
BILIRUB SERPL-MCNC: 0.5 MG/DL (ref 0.2–1.2)
BUN SERPL-MCNC: 25 MG/DL (ref 6–20)
BUN/CREAT SERPL: 32.9 (ref 7.3–30)
CALCIUM SPEC-SCNC: 9.5 MG/DL (ref 8.5–10.2)
CHLORIDE SERPL-SCNC: 105 MMOL/L (ref 98–107)
CO2 SERPL-SCNC: 27.4 MMOL/L (ref 22–29)
CREAT SERPL-MCNC: 0.76 MG/DL (ref 0.6–1.1)
DEPRECATED RDW RBC AUTO: 78.8 FL (ref 37–54)
EGFRCR SERPLBLD CKD-EPI 2021: 77.4 ML/MIN/1.73
EOSINOPHIL # BLD AUTO: 0.17 10*3/MM3 (ref 0–0.4)
EOSINOPHIL NFR BLD AUTO: 1.4 % (ref 0.3–6.2)
ERYTHROCYTE [DISTWIDTH] IN BLOOD BY AUTOMATED COUNT: 20.3 % (ref 12.3–15.4)
GLOBULIN UR ELPH-MCNC: 2.4 GM/DL (ref 1.8–3.5)
GLUCOSE SERPL-MCNC: 124 MG/DL (ref 74–124)
HCT VFR BLD AUTO: 26.5 % (ref 34–46.6)
HGB BLD-MCNC: 8.5 G/DL (ref 12–15.9)
HGB RETIC QN AUTO: 35.4 PG (ref 29.8–36.1)
IMM GRANULOCYTES # BLD AUTO: 0.21 10*3/MM3 (ref 0–0.05)
IMM GRANULOCYTES NFR BLD AUTO: 1.7 % (ref 0–0.5)
IMM RETICS NFR: 27.1 % (ref 3–15.8)
LYMPHOCYTES # BLD AUTO: 1.59 10*3/MM3 (ref 0.7–3.1)
LYMPHOCYTES NFR BLD AUTO: 12.8 % (ref 19.6–45.3)
MCH RBC QN AUTO: 33.9 PG (ref 26.6–33)
MCHC RBC AUTO-ENTMCNC: 32.1 G/DL (ref 31.5–35.7)
MCV RBC AUTO: 105.6 FL (ref 79–97)
MONOCYTES # BLD AUTO: 0.94 10*3/MM3 (ref 0.1–0.9)
MONOCYTES NFR BLD AUTO: 7.6 % (ref 5–12)
NEUTROPHILS NFR BLD AUTO: 75.9 % (ref 42.7–76)
NEUTROPHILS NFR BLD AUTO: 9.44 10*3/MM3 (ref 1.7–7)
NRBC BLD AUTO-RTO: 0 /100 WBC (ref 0–0.2)
PLATELET # BLD AUTO: 216 10*3/MM3 (ref 140–450)
PMV BLD AUTO: 10.3 FL (ref 6–12)
POTASSIUM SERPL-SCNC: 4.2 MMOL/L (ref 3.5–4.7)
PROT SERPL-MCNC: 7 G/DL (ref 6.3–8)
RBC # BLD AUTO: 2.51 10*6/MM3 (ref 3.77–5.28)
RETICS # AUTO: 0.06 10*6/MM3 (ref 0.02–0.13)
RETICS/RBC NFR AUTO: 2.38 % (ref 0.7–1.9)
SODIUM SERPL-SCNC: 141 MMOL/L (ref 134–145)
WBC NRBC COR # BLD: 12.43 10*3/MM3 (ref 3.4–10.8)

## 2022-05-26 PROCEDURE — 99214 OFFICE O/P EST MOD 30 MIN: CPT | Performed by: INTERNAL MEDICINE

## 2022-05-26 PROCEDURE — 80053 COMPREHEN METABOLIC PANEL: CPT

## 2022-05-26 PROCEDURE — 85025 COMPLETE CBC W/AUTO DIFF WBC: CPT

## 2022-05-26 PROCEDURE — 36415 COLL VENOUS BLD VENIPUNCTURE: CPT

## 2022-05-26 PROCEDURE — 85046 RETICYTE/HGB CONCENTRATE: CPT

## 2022-05-26 RX ORDER — PANTOPRAZOLE SODIUM 40 MG/1
1 TABLET, DELAYED RELEASE ORAL DAILY
COMMUNITY
Start: 2022-02-15 | End: 2022-05-26 | Stop reason: SDUPTHER

## 2022-05-26 RX ORDER — CLOPIDOGREL BISULFATE 75 MG/1
1 TABLET ORAL DAILY
COMMUNITY
Start: 2022-03-01 | End: 2022-12-23 | Stop reason: SDUPTHER

## 2022-05-26 RX ORDER — LISINOPRIL 10 MG/1
1 TABLET ORAL DAILY
COMMUNITY
Start: 2022-04-07 | End: 2022-09-04 | Stop reason: HOSPADM

## 2022-05-26 RX ORDER — METOPROLOL SUCCINATE 25 MG/1
25 TABLET, EXTENDED RELEASE ORAL 2 TIMES DAILY
COMMUNITY
Start: 2022-03-09 | End: 2023-03-01 | Stop reason: SDUPTHER

## 2022-05-26 RX ORDER — NIFEDIPINE 30 MG/1
1 TABLET, EXTENDED RELEASE ORAL DAILY
COMMUNITY
Start: 2022-02-04 | End: 2022-05-26 | Stop reason: SDDI

## 2022-05-26 RX ORDER — ATORVASTATIN CALCIUM 80 MG/1
1 TABLET, FILM COATED ORAL NIGHTLY
COMMUNITY
Start: 2022-03-01 | End: 2022-12-23 | Stop reason: SDUPTHER

## 2022-05-26 RX ORDER — ALLOPURINOL 300 MG/1
300 TABLET ORAL DAILY
COMMUNITY
End: 2022-07-14 | Stop reason: ALTCHOICE

## 2022-05-26 NOTE — PROGRESS NOTES
Specialty Pharmacy Refill Coordination Note     Catherine is a 82 y.o. female contacted today regarding refills of Jakafi specialty medication(s).    Reviewed and verified with patient's daughter, Ila    Specialty medication(s) and dose(s) confirmed: yes    Refill Questions    Flowsheet Row Most Recent Value   Changes to allergies? No   Changes to medications? No   New conditions since last clinic visit No   Unplanned office visit, urgent care, ED, or hospital admission in the last 4 weeks  No   How does patient/caregiver feel medication is working? Good   Financial problems or insurance changes  No   Since the previous refill, were any specialty medication doses or scheduled injections missed or delayed?  No   Does this patient require a clinical escalation to a pharmacist? No          Delivery Questions    Flowsheet Row Most Recent Value   Delivery method Other (Comment)  [[facility] to pts address on 5/27-Please call Ila at [phone] if unable to deliver to room #]   Delivery address correct? Yes  [Shipping to pts home address listed]   Delivery phone number [phone]   Number of medications in delivery 1   Medication being filled and delivered Jakafi   Doses left of specialty medications 5 days   Is there any medication that is due not being filled? No   Supplies needed? No supplies needed   Cooler needed? No   Do any medications need mixed or dated? No   Copay form of payment Payment plan already set up   Additional comments $0 copay-Insurance pays 100%   Questions or concerns for the pharmacist? No   Explain any questions or concerns for the pharmacist N/A   Are any medications first time fills? No  [Converted to [facility]]          Jakafi fill through [facility] coordinated with Ila for 5/27/22 to pts home address listed. Address confirmed with her-if [facility] is not able to take directly to her room number-they can call Ila to have pt meet in the Lobby. $0 copay with pts  insurance paying 100%. She is converting her care to our office and will run out of medication on 5/30.     Follow-up: 21 day(s)     Isi Mao  Specialty Pharmacy Technician

## 2022-05-26 NOTE — PROGRESS NOTES
MTM Encounter-Re: Adherence and side effects (Jakafi)    Today's encounter was conducted in person, face-to-face.     Medication:  Jakafi 15 mg po bid   - Reason for outreach: Routine medication check-in .  - Administration: Patient is taking every day at the same time  and twice daily.  - Missed doses: Patient reports missing 0 doses in the last 30 days.  - Self-administration: Patient demonstrates ability to self-administer medication. No barriers to adherence identified.   - Diagnosis/Indication: Myelofibrosis. Progress toward achieving therapeutic goals reviewed.   - Patient denies side effects.    - Medication availability/affordability: Patient has had no issues obtaining medication from pharmacy.   - Questions/concerns about medications: none       Completed medication reconciliation today to assess for drug interactions.   Reviewed allergies, medical history, labs, quality of life, and medication history with patient.   Patient denies starting or stopping any medications.  Assessed medication list for interactions, no significant drug interactions noted.   Advised pt to call the clinic if any new medications are started so we can assess for drug-drug interactions.     All questions addressed. Patient had no additional concerns for MTM office.     Lidia Frazier RPH  5/26/2022  09:14 EDT

## 2022-05-27 ENCOUNTER — DOCUMENTATION (OUTPATIENT)
Dept: ONCOLOGY | Facility: CLINIC | Age: 82
End: 2022-05-27

## 2022-05-27 NOTE — PROGRESS NOTES
Jakafi supply from [facility] delivered to pt on 5/27/22.    This was confirmed with pts daughter, Ila.     Isi Mao  Specialty Pharmacy Technician

## 2022-06-06 ENCOUNTER — DOCUMENTATION (OUTPATIENT)
Dept: ONCOLOGY | Facility: CLINIC | Age: 82
End: 2022-06-06

## 2022-06-07 ENCOUNTER — TELEPHONE (OUTPATIENT)
Dept: ONCOLOGY | Facility: CLINIC | Age: 82
End: 2022-06-07

## 2022-06-07 ENCOUNTER — DOCUMENTATION (OUTPATIENT)
Dept: ONCOLOGY | Facility: CLINIC | Age: 82
End: 2022-06-07

## 2022-06-07 NOTE — TELEPHONE ENCOUNTER
M for Ila to return call regarding pts Eliquis refill. I spoke with Isi Mao. She is going to take care of the PA for the Eliquis.

## 2022-06-07 NOTE — TELEPHONE ENCOUNTER
Caller: RAY RUGGIERO    Relationship: Emergency Contact    Best call back number: [phone]    What was the call regarding: RAY CALLED REGARDING [facility] ELIQUIS MEDICATION. SHE IS AT THE PHARMACY AND THEY TOLD HER THAT THE OFFICE NEEDS TO CALL THE INSURANCE COMPANY TO GET IT APPROVED BEFORE THEY ARE ABLE TO PICK IT UP. SHE IS COMPLETELY OUT OF THE MEDICATION.    Do you require a callback:YES

## 2022-06-07 NOTE — PROGRESS NOTES
Call rec from Nicki ABDULLAHI, Clinical RN-she states pts daughter called reporting she was at the pharmacy to get the Eliquis and was so it needed a PA.    I attempted a PA to [facility] [employer] and rec a message stating the drug is covered by current benefit plan-no further PA activity needed.    I contacted [employer] and spoke to Venancio-he states the issue has been resolved and they were able to get a paid claim for 90 days.    Nicki ABDULLAHI Notified     Isi Mao  Specialty Pharmacy Technician

## 2022-06-08 ENCOUNTER — TELEPHONE (OUTPATIENT)
Dept: CARDIOLOGY CLINIC | Age: 82
End: 2022-06-08

## 2022-06-08 RX ORDER — ATORVASTATIN CALCIUM 80 MG/1
80 TABLET, FILM COATED ORAL NIGHTLY
Qty: 90 TABLET | OUTPATIENT
Start: 2022-06-08

## 2022-06-08 RX ORDER — CLOPIDOGREL BISULFATE 75 MG/1
75 TABLET ORAL DAILY
Qty: 30 TABLET | OUTPATIENT
Start: 2022-06-08

## 2022-06-08 NOTE — TELEPHONE ENCOUNTER
Caller: TIM    Relationship: DAUGHTER    Best call back number: [phone]    Requested Prescriptions:   Requested Prescriptions     Pending Prescriptions Disp Refills   • atorvastatin (LIPITOR) 80 MG tablet 90 tablet      Sig: Take 1 tablet by mouth Every Night.   • clopidogrel (PLAVIX) 75 MG tablet 30 tablet      Sig: Take 1 tablet by mouth Daily.        Pharmacy where request should be sent: [facility] - [address] [phone] [place] [phone] FX     Does the patient have less than a 3 day supply:  [x] Yes  [] No    Larry Guillen Rep   06/08/22 07:46 EDT

## 2022-06-21 ENCOUNTER — SPECIALTY PHARMACY (OUTPATIENT)
Dept: ONCOLOGY | Facility: CLINIC | Age: 82
End: 2022-06-21

## 2022-06-21 NOTE — PROGRESS NOTES
Specialty Pharmacy Refill Coordination Note     Catherine is a 82 y.o. female contacted today regarding refills of Jakafi specialty medication(s).    Reviewed and verified with patient's daughter, Ila    Specialty medication(s) and dose(s) confirmed: yes  Jakafi 15 mg twice per day    Refill Questions    Flowsheet Row Most Recent Value   Changes to allergies? No   Changes to medications? No   New conditions since last clinic visit No   Unplanned office visit, urgent care, ED, or hospital admission in the last 4 weeks  No   How does patient/caregiver feel medication is working? Good   Financial problems or insurance changes  No   Since the previous refill, were any specialty medication doses or scheduled injections missed or delayed?  No   Does this patient require a clinical escalation to a pharmacist? No          Delivery Questions    Flowsheet Row Most Recent Value   Delivery method Other (Comment)  [Beeline to pts address on 6/24-Please call Ila at [phone] if unable to deliver to room #]   Delivery address correct? Yes  [Shipping to pts home address]   Delivery phone number [phone]   Preferred delivery time? AM   Number of medications in delivery 1   Medication being filled and delivered Jakafi   Doses left of specialty medications 7 days   Is there any medication that is due not being filled? No   Supplies needed? No supplies needed   Cooler needed? No   Do any medications need mixed or dated? No   Copay form of payment Payment plan already set up   Additional comments $0 copay-Insurance pays 100%   Questions or concerns for the pharmacist? No   Explain any questions or concerns for the pharmacist N/A   Are any medications first time fills? No        Jakafi delivery coordinated to pts home on 6/24/22. $0 copay with insurance. Her last delivery was on 5/27/22. No issues or concerns to report today.     Follow-up: 21 day(s)     Isi Mao  Specialty Pharmacy Technician

## 2022-06-29 NOTE — PROGRESS NOTES
"[facility] CBC GROUP OUTPATIENT FOLLOW UP CLINIC VISIT    REASON FOR FOLLOW-UP:    Myelofibrosis, on Jakafi    HISTORY OF PRESENT ILLNESS:  Catherine Boyer is a 82 y.o. female who returns today for follow up of the above issue.      She is doing well.  She is gaining weight.  She tolerates Jakafi very well.  No fevers chills or night sweats.  Energy level is excellent.  No bleeding.      REVIEW OF SYSTEMS:  As per HPI    PHYSICAL EXAMINATION:    Vitals:    06/30/22 1201   BP: 103/64   Pulse: 67   Resp: 16   Temp: 97.5 °F (36.4 °C)   TempSrc: Temporal   SpO2: 94%   Weight: 68.1 kg (150 lb 3.2 oz)   Height: 154.5 cm (60.83\")   PainSc: 0-No pain       GENERAL:  Well-developed well-nourished female; awake, alert and oriented, in no acute distress.  SKIN:  Warm and dry, without rashes, purpura, or petechiae.  HEAD:  Normocephalic, atraumatic. Wearing a facemask.  EARS:  Hearing intact.  LYMPHATICS:  No cervical, supraclavicular, or axillary lymphadenopathy palpable today.  CHEST:  Lungs are clear to auscultation bilaterally.  No wheezes, rales, or rhonchi.  HEART:  Regular rate; normal rhythm.  No murmurs, gallops or rubs.  EXTREMITIES:  No clubbing cyanosis or edema.  NEUROLOGICAL:  No focal neurologic deficits.      DIAGNOSTIC DATA:  Retic With IRF & RET-He (06/30/2022 11:49)  CBC & Differential (06/30/2022 11:49)      IMAGING:    None reviewed    ASSESSMENT:  This is a 82 y.o. female with:    *JAK2 positive myeloproliferative disorder, perhaps primary myelofibrosis  · [facility] [place].      · She was seen there initially on 6/4/2021 with leukocytosis with a white blood cell count of 104,000.  Hemoglobin was normal at 11.6 and platelets were normal at 299,000.  PCR for BCR/ABL and FISH for BCR/ABL were negative.  A bone marrow aspiration was attempted on 6/9/2021 which was unsuccessful.  She then went to [place] for the summer.  She " was admitted to the [facility] between 6/29/2021 and 7/9/2021.  She had a bone marrow aspiration and biopsy performed there on 6/30/2021 showing a hypercellular marrow at greater than 95% with 1% blasts.  Focal 1+ reticulin fibrosis was noted.  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  PCR for BCR/ABL was negative.  JAK2 V617F PCR was positive.  Cytogenetics were normal.  Next generation myeloid disorder profiling of the bone marrow aspirate from 6/30/2021 showed TET2 O7660I and [id number]*47 abnormalities and JAK2 V617F.  Therefore she was suspected to have early primary myelofibrosis and she was started on hydroxyurea 1000 mg daily.  Subsequently, hydroxyurea was held.  Blood counts had improved.  She was admitted to the hospital from 2/9 through 2/15/2022 for symptomatic anemia and chest pain.  Her hemoglobin was 5.5.  The white blood cell count was 7.6.  Platelets were 56,000.  Fecal occult blood testing was negative.  She received 3 units of packed red blood cells.  No endoscopy was performed.  A left heart catheterization was performed with a drug-eluting stent placed to the second diagonal on 2/11/2022 and she was discharged from that hospitalization on aspirin 81 mg, Plavix 75 mg, and Eliquis 5 mg twice daily.  Blood counts improved by 3/7/2022 and her white blood cell count was 7.2 with a hemoglobin 11.4 and platelets 295,000.  She did have a bone marrow aspiration and biopsy on 3/7/2022 showing chronic myeloproliferative neoplasm with potentially post ET fibrosis or consideration of primary myelofibrosis.  There was no evidence for myelodysplasia.  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  She also has atrial fibrillation and is anticoagulated with Eliquis.    · Hydroxyurea was discontinued and she was started on ruxolitinib 15 mg twice daily at her visit on 3/14/2022 with Dr. Gee  Ahsan.  · She had CT imaging of the chest abdomen pelvis on 3/19/2022 that did not demonstrate any splenomegaly.  Calcified mediastinal lymphadenopathy was noted.  Borderline pretracheal and right hilar lymphadenopathy noted.  Small bilateral pleural effusions with bibasilar atelectasis noted.  · She has moved from [place] to [place] and is seen initially in the office on 5/15/2022.  She tolerates ruxolitinib well.  White blood cell count has decreased from 5.9 from 19.6.  Platelets have normalized at 170,000, down from 464,000.  The hemoglobin has also decreased at 8.6 with an MCV of 98.9.  · 5/26/2022: White blood cell count mildly elevated at 12.4 with a normal platelet count at 216,000.  She tolerates Jakafi very well.   · 6/30/2022: White blood cell count a little higher.  Continue monitoring.     *Microcytic anemia  · Hemoglobin on 5/5/2022 was declining at 8.6.  · Ferritin 242 with an iron of 105 and 33% iron saturation, ferritin vitamin B12 1107, folic acid 15.1, normal RBC folate, creatinine 0.86,   · 5/26/2022: Hemoglobin stable at 8.5.  The reticulocyte count is a little bit more elevated.  I am hopeful that the hemoglobin will improve.  If it does not we will need to consider Procrit.  · 6/30/2022: Hemoglobin improving at 9.5 with reticulocyte count of 3%.     *History of marantic endocarditis and atrial fibrillation  · Anticoagulated with Eliquis 5 mg twice daily     PLAN:   1. Continue ruxolitinib (Jakafi) 15 mg twice daily.  This was initiated prior to her being seen here in the office but we are prescribing this at this point.  2. She continues Eliquis 5 mg twice daily as well as Plavix 75 mg daily.  3. She will see Dr. Barriga with cardiology on 7/15 for management of her cardiac issues which include coronary artery disease, mild systolic heart failure, and history of endocarditis and atrial fibrillation requiring anticoagulation.  4. Follow-up with me in about 6 weeks with labs    High  risk medication requiring intensive monitoring

## 2022-06-30 ENCOUNTER — LAB (OUTPATIENT)
Dept: LAB | Facility: HOSPITAL | Age: 82
End: 2022-06-30

## 2022-06-30 ENCOUNTER — OFFICE VISIT (OUTPATIENT)
Dept: ONCOLOGY | Facility: CLINIC | Age: 82
End: 2022-06-30

## 2022-06-30 VITALS
TEMPERATURE: 97.5 F | SYSTOLIC BLOOD PRESSURE: 103 MMHG | HEART RATE: 67 BPM | WEIGHT: 150.2 LBS | RESPIRATION RATE: 16 BRPM | DIASTOLIC BLOOD PRESSURE: 64 MMHG | HEIGHT: 61 IN | OXYGEN SATURATION: 94 % | BODY MASS INDEX: 28.36 KG/M2

## 2022-06-30 DIAGNOSIS — D75.81 MYELOFIBROSIS: ICD-10-CM

## 2022-06-30 DIAGNOSIS — D53.9 MACROCYTIC ANEMIA: ICD-10-CM

## 2022-06-30 DIAGNOSIS — D75.81 MYELOFIBROSIS: Primary | ICD-10-CM

## 2022-06-30 LAB
ALBUMIN SERPL-MCNC: 4.6 G/DL (ref 3.5–5.2)
ALBUMIN/GLOB SERPL: 2 G/DL (ref 1.1–2.4)
ALP SERPL-CCNC: 86 U/L (ref 38–116)
ALT SERPL W P-5'-P-CCNC: 16 U/L (ref 0–33)
ANION GAP SERPL CALCULATED.3IONS-SCNC: 10.5 MMOL/L (ref 5–15)
AST SERPL-CCNC: 14 U/L (ref 0–32)
BASOPHILS # BLD AUTO: 0.13 10*3/MM3 (ref 0–0.2)
BASOPHILS NFR BLD AUTO: 0.7 % (ref 0–1.5)
BILIRUB SERPL-MCNC: 0.5 MG/DL (ref 0.2–1.2)
BUN SERPL-MCNC: 24 MG/DL (ref 6–20)
BUN/CREAT SERPL: 32.4 (ref 7.3–30)
CALCIUM SPEC-SCNC: 9.4 MG/DL (ref 8.5–10.2)
CHLORIDE SERPL-SCNC: 104 MMOL/L (ref 98–107)
CO2 SERPL-SCNC: 24.5 MMOL/L (ref 22–29)
CREAT SERPL-MCNC: 0.74 MG/DL (ref 0.6–1.1)
DEPRECATED RDW RBC AUTO: 72.5 FL (ref 37–54)
EGFRCR SERPLBLD CKD-EPI 2021: 79.9 ML/MIN/1.73
EOSINOPHIL # BLD AUTO: 0.18 10*3/MM3 (ref 0–0.4)
EOSINOPHIL NFR BLD AUTO: 1 % (ref 0.3–6.2)
ERYTHROCYTE [DISTWIDTH] IN BLOOD BY AUTOMATED COUNT: 18.3 % (ref 12.3–15.4)
FERRITIN SERPL-MCNC: 110.7 NG/ML (ref 13–150)
GLOBULIN UR ELPH-MCNC: 2.3 GM/DL (ref 1.8–3.5)
GLUCOSE SERPL-MCNC: 116 MG/DL (ref 74–124)
HCT VFR BLD AUTO: 29.2 % (ref 34–46.6)
HGB BLD-MCNC: 9.5 G/DL (ref 12–15.9)
HGB RETIC QN AUTO: 36 PG (ref 29.8–36.1)
IMM GRANULOCYTES # BLD AUTO: 0.8 10*3/MM3 (ref 0–0.05)
IMM GRANULOCYTES NFR BLD AUTO: 4.3 % (ref 0–0.5)
IMM RETICS NFR: 21.6 % (ref 3–15.8)
IRON 24H UR-MRATE: 84 MCG/DL (ref 37–145)
IRON SATN MFR SERPL: 22 % (ref 14–48)
LYMPHOCYTES # BLD AUTO: 2.49 10*3/MM3 (ref 0.7–3.1)
LYMPHOCYTES NFR BLD AUTO: 13.2 % (ref 19.6–45.3)
MCH RBC QN AUTO: 35.1 PG (ref 26.6–33)
MCHC RBC AUTO-ENTMCNC: 32.5 G/DL (ref 31.5–35.7)
MCV RBC AUTO: 107.7 FL (ref 79–97)
MONOCYTES # BLD AUTO: 2 10*3/MM3 (ref 0.1–0.9)
MONOCYTES NFR BLD AUTO: 10.6 % (ref 5–12)
NEUTROPHILS NFR BLD AUTO: 13.22 10*3/MM3 (ref 1.7–7)
NEUTROPHILS NFR BLD AUTO: 70.2 % (ref 42.7–76)
NRBC BLD AUTO-RTO: 0 /100 WBC (ref 0–0.2)
PLATELET # BLD AUTO: 255 10*3/MM3 (ref 140–450)
PMV BLD AUTO: 10.2 FL (ref 6–12)
POTASSIUM SERPL-SCNC: 4.8 MMOL/L (ref 3.5–4.7)
PROT SERPL-MCNC: 6.9 G/DL (ref 6.3–8)
RBC # BLD AUTO: 2.71 10*6/MM3 (ref 3.77–5.28)
RETICS # AUTO: 0.08 10*6/MM3 (ref 0.02–0.13)
RETICS/RBC NFR AUTO: 3.05 % (ref 0.7–1.9)
SODIUM SERPL-SCNC: 139 MMOL/L (ref 134–145)
TIBC SERPL-MCNC: 386 MCG/DL (ref 249–505)
TRANSFERRIN SERPL-MCNC: 276 MG/DL (ref 200–360)
URATE SERPL-MCNC: 5.9 MG/DL (ref 2.8–7.4)
WBC NRBC COR # BLD: 18.82 10*3/MM3 (ref 3.4–10.8)

## 2022-06-30 PROCEDURE — 85025 COMPLETE CBC W/AUTO DIFF WBC: CPT

## 2022-06-30 PROCEDURE — 80053 COMPREHEN METABOLIC PANEL: CPT

## 2022-06-30 PROCEDURE — 36415 COLL VENOUS BLD VENIPUNCTURE: CPT

## 2022-06-30 PROCEDURE — 84550 ASSAY OF BLOOD/URIC ACID: CPT | Performed by: INTERNAL MEDICINE

## 2022-06-30 PROCEDURE — 83540 ASSAY OF IRON: CPT

## 2022-06-30 PROCEDURE — 99214 OFFICE O/P EST MOD 30 MIN: CPT | Performed by: INTERNAL MEDICINE

## 2022-06-30 PROCEDURE — 82728 ASSAY OF FERRITIN: CPT

## 2022-06-30 PROCEDURE — 84466 ASSAY OF TRANSFERRIN: CPT

## 2022-06-30 PROCEDURE — 85046 RETICYTE/HGB CONCENTRATE: CPT

## 2022-07-01 LAB — EPO SERPL-ACNC: 169.2 MIU/ML (ref 2.6–18.5)

## 2022-07-05 ENCOUNTER — TELEPHONE (OUTPATIENT)
Dept: ONCOLOGY | Facility: HOSPITAL | Age: 82
End: 2022-07-05

## 2022-07-05 NOTE — TELEPHONE ENCOUNTER
LVM for Ila, pts daughter letting her know the pts uric acid level has stabilized and she can discontinue taking the allopurinol. I advised her to call back with any questions or concerns. Phone number provided.

## 2022-07-05 NOTE — TELEPHONE ENCOUNTER
----- Message from Darrell Reinoso MD sent at 7/1/2022  5:06 PM EDT -----  Uric acid is normal. I think she can stop allopurinol. Please let her know. oseas

## 2022-07-11 ENCOUNTER — TELEPHONE (OUTPATIENT)
Dept: ONCOLOGY | Facility: CLINIC | Age: 82
End: 2022-07-11

## 2022-07-11 NOTE — TELEPHONE ENCOUNTER
Caller: Tim Gutierrez    Relationship: Emergency Contact    Best call back number: [phone]    Requested Prescriptions:   Requested Prescriptions     Pending Prescriptions Disp Refills   • sertraline (ZOLOFT) 50 MG tablet          Pharmacy where request should be sent: [email or web address] - [address] [phone] [facility] [phone]      Additional details provided by patient: TIM WOULD LIKE TO SWITCH TO A 90 DAY SUPPLY IF POSSIBLE     Does the patient have less than a 3 day supply:  [x] Yes  [] No

## 2022-07-11 NOTE — TELEPHONE ENCOUNTER
Caller: RAY RUGGIERO    Relationship: Emergency Contact    Best call back number: [phone]    Requested Prescriptions:   ZOLOFT  50 MG    Pharmacy where request should be sent:    [facility] - [place] [phone] [facility] [phone] FX    Larry GRIFFIN Rep   07/11/22 11:42 EDT

## 2022-07-12 NOTE — PROGRESS NOTES
RM:________    Referral Provider: Darrell Reinoso MD Provider, No Known    NEW PATIENT/ CONSULT  PREVIOUS CARDIOLOGIST:   CARDIAC TESTING:     : 1940   AGE: 82 y.o.    2022  REASON FOR VISIT/  CC:      WT: ____________ BP: __________L __________R/ HR_______________    CHEST PAIN: _____________    SOA: ____________PALPS: __________      LIGHTHEADED: ___________ FATIGUE: _______________ EDEMA______________  ALLERGIES:  Aspirin and Oxycodone  SMOKING HISTORY  Social History     Tobacco Use   • Smoking status: Former Smoker     Packs/day: 1.00     Years: 20.00     Pack years: 20.00   • Smokeless tobacco: Never Used   • Tobacco comment: 30  years ago   Substance Use Topics   • Alcohol use: Yes     Comment: Occasional   • Drug use: Never          CHILDREN: _______________________       CAFFEINE USE________  ALCOHOL _____________  OCCUPATION_____________  Past Surgical History:   Procedure Laterality Date   • BREAST LUMPECTOMY     • CATARACT EXTRACTION                  FAMILY HISTORY  HEART DISEASE  CHF  DIABETES  CARDIAC ARREST  STROKE  CANCER  ANEURYSM                                                             H/O: MI_____   STROKE________   GOUT_____   ANEMIA______     CAROTID________ HIV____ CAD_______ HYPERCHOL _____    H/O: CHF _____   RF____ DM___ HTN_______PVD____THYROID DISEASE_______    PMH: GI ____   HEPATITIS ___ KIDNEY DISEASE ___ LUNG DISEASE _______     SLEEP APNEA ____ BLOOD CLOTS ____ DVT ____ VEIN STRIPPING ___________     CANCER _________________________________ CHEMO/ RADIATION__________

## 2022-07-14 ENCOUNTER — OFFICE VISIT (OUTPATIENT)
Dept: CARDIOLOGY | Facility: CLINIC | Age: 82
End: 2022-07-14

## 2022-07-14 VITALS
BODY MASS INDEX: 28.77 KG/M2 | HEIGHT: 61 IN | WEIGHT: 152.4 LBS | SYSTOLIC BLOOD PRESSURE: 126 MMHG | HEART RATE: 73 BPM | DIASTOLIC BLOOD PRESSURE: 70 MMHG

## 2022-07-14 DIAGNOSIS — I48.0 PAROXYSMAL ATRIAL FIBRILLATION: ICD-10-CM

## 2022-07-14 DIAGNOSIS — I25.10 CORONARY ARTERY DISEASE INVOLVING NATIVE CORONARY ARTERY OF NATIVE HEART WITHOUT ANGINA PECTORIS: Primary | ICD-10-CM

## 2022-07-14 DIAGNOSIS — I38 NONBACTERIAL THROMBOTIC ENDOCARDITIS: ICD-10-CM

## 2022-07-14 DIAGNOSIS — D47.1 MYELOPROLIFERATIVE DISORDER: ICD-10-CM

## 2022-07-14 DIAGNOSIS — I50.32 CHRONIC DIASTOLIC (CONGESTIVE) HEART FAILURE: ICD-10-CM

## 2022-07-14 DIAGNOSIS — E11.59 TYPE 2 DIABETES MELLITUS WITH OTHER CIRCULATORY COMPLICATION, WITHOUT LONG-TERM CURRENT USE OF INSULIN: ICD-10-CM

## 2022-07-14 DIAGNOSIS — D50.9 MICROCYTIC ANEMIA: ICD-10-CM

## 2022-07-14 PROBLEM — E11.9 TYPE 2 DIABETES MELLITUS: Status: ACTIVE | Noted: 2022-07-14

## 2022-07-14 PROCEDURE — 99204 OFFICE O/P NEW MOD 45 MIN: CPT | Performed by: INTERNAL MEDICINE

## 2022-07-14 PROCEDURE — 93000 ELECTROCARDIOGRAM COMPLETE: CPT | Performed by: INTERNAL MEDICINE

## 2022-07-14 NOTE — PROGRESS NOTES
"Date of Office Visit: 22  Encounter Provider: Albin Barriga MD  Place of Service: [facility] CARDIOLOGY  Patient Name: Catherine Boyer  :1940    Chief Complaint   Patient presents with   • Establish Care   :     HPI:     Ms. Boyer is 82 y.o. and presents today to establish care. I have reviewed prior notes and there are no changes except for any new updates described below. I have also reviewed any information entered into the medical record by the patient or by ancillary staff.     She has a remote history of right sided breast cancer; she underwent lumpectomy and radiation. She did not require chemotherapy or hormonal therapy.    She has DM2, hypertension, hyperlipidemia, and mild carotid disease. She has a history of TIA.  She was admitted to a hospital in [place] in 2021 (she was visiting family) with nausea and vomiting. She was diagnosed with a porcelain gallbladder. She was noted to be in atrial fibrillation. An echo reported a mitral valve mass; a CHERI showed a 4x5mm vegetation/thrombus on the ventricular surface of the mitral valve. Blood cultures were negative and she was diagnosed with nonbacterial endocarditis (\"marantic\" endocarditis). She was diagnosed with a JAK2+ myeloproliferative disorder at the time, as well.    She presented to the hospital in 2022 with chest pain (\"heartburn\"); she was markedly anemic (5.5) and required PRBC infusion. She ruled in for a non-STEMI.  We have tried to get the operative report from the cath but have been unable. Per the chart, coronary angiography revealed severe disease of the LAD and diagonal and she underwent PCI x 2. She has a long lesion in the mid-distal LAD that is being managed medically. Her last echo was in 2022. It showed mild LV systolic dysfunction, EF 45-50%, apical hypokinesis, grade 2 diastolic dysfunction, a torn MV chord, moderate LAE, mild-moderate TR, and moderate PHTN (49mm Hg). " "    She has followed up with a cardiologist at [facility] in [place].  She recently moved to [place] to be near family.  Her last office visit was in April.  She is doing quite well.  She did miss her diuretic one day, and she became short of breath that evening.  She was hoping to decrease the frequency of her diuretic as she urinates very frequently.  She has not had any further episodes of chest pain or heartburn.  She denies palpitations, lightheadedness, or syncope.  She denies leg swelling, orthopnea, or PND.  She is on apixaban and clopidogrel, and denies bleeding.    Past Medical History:   Diagnosis Date   • Atrial fibrillation (HCC)    • Breast cancer (HCC)    • CAD (coronary artery disease)     NSTEMI 2/2022: 90% ostial LAD, 99% D1, 70% mid-distal LAD (medical therapy). She received two stents (2.5x18 and 2.5x26mm Finesse LEFTY) but I don't know which one went to which lesion.   • Carotid atherosclerosis    • Chronic diastolic (congestive) heart failure (HCC)    • GERD (gastroesophageal reflux disease)    • Glaucoma    • History of cataract    • Hypertension    • Microcytic anemia     per Dr. oleg pate office  note 6/30/22-dd   • Myeloproliferative disorder (HCC)     JAK2 positive   • Nonbacterial thrombotic endocarditis     6/2021: 4x5mm vegetation on the ventricular surface of the anterior MV, negative blood cultures   • Porcelain gallbladder    • PUD (peptic ulcer disease)    • TIA (transient ischemic attack)    • Type 2 diabetes mellitus (HCC)    • Upper GI bleed        Past Surgical History:   Procedure Laterality Date   • BREAST LUMPECTOMY  1999   • CATARACT EXTRACTION  2011       Social History     Socioeconomic History   • Marital status:    Tobacco Use   • Smoking status: Former Smoker     Packs/day: 1.00     Years: 20.00     Pack years: 20.00   • Smokeless tobacco: Never Used   • Tobacco comment: 30  years ago   Substance and Sexual Activity   • Alcohol use: Yes     Comment: \"rare\" " "  • Drug use: Never       Family History   Problem Relation Age of Onset   • Ovarian cancer Mother    • Lung cancer Father    • Lung cancer Sister        Review of Systems   Respiratory: Positive for shortness of breath.    Musculoskeletal: Positive for joint pain.   All other systems reviewed and are negative.      Allergies   Allergen Reactions   • Aspirin Unknown - Low Severity   • Oxycodone Unknown - Low Severity         Current Outpatient Medications:   •  acetaminophen (TYLENOL) 500 MG tablet, Take 500 mg by mouth Every 4 (Four) Hours As Needed., Disp: , Rfl:   •  apixaban (ELIQUIS) 5 MG tablet tablet, Take 1 tablet by mouth Every 12 (Twelve) Hours for 180 days., Disp: 60 tablet, Rfl: 5  •  atorvastatin (LIPITOR) 80 MG tablet, Take 1 tablet by mouth Every Night., Disp: , Rfl:   •  bimatoprost (LUMIGAN) 0.01 % ophthalmic drops, Apply 1 drop to eye(s) as directed by provider., Disp: , Rfl:   •  clopidogrel (PLAVIX) 75 MG tablet, Take 1 tablet by mouth Daily., Disp: , Rfl:   •  furosemide (LASIX) 40 MG tablet, Take 40 mg by mouth Daily., Disp: , Rfl:   •  Jakafi 15 MG chemo tablet, Take 1 tablet by mouth 2 (Two) Times a Day., Disp: 60 tablet, Rfl: 3  •  latanoprost (XALATAN) 0.005 % ophthalmic solution, , Disp: , Rfl:   •  lisinopril (PRINIVIL,ZESTRIL) 10 MG tablet, Take 1 tablet by mouth Daily., Disp: , Rfl:   •  metoprolol succinate XL (TOPROL-XL) 25 MG 24 hr tablet, 25 mg 2 (Two) Times a Day., Disp: , Rfl:   •  pantoprazole (PROTONIX) 40 MG EC tablet, Take 40 mg by mouth Daily., Disp: , Rfl:   •  sertraline (ZOLOFT) 50 MG tablet, Take 50 mg by mouth Daily., Disp: , Rfl:       Objective:     Vitals:    07/14/22 1303 07/14/22 1311   BP: 130/70 126/70   BP Location: Left arm Right arm   Pulse: 73    Weight: 69.1 kg (152 lb 6.4 oz)    Height: 154.9 cm (61\")      Body mass index is 28.8 kg/m².    Vitals reviewed.   Constitutional:       Appearance: Well-developed and not in distress.   Eyes:      " Conjunctiva/sclera: Conjunctivae normal.   HENT:      Head: Normocephalic.      Nose: Nose normal.         Comments: Masked  Neck:      Vascular: No JVD. JVD normal.      Lymphadenopathy: No cervical adenopathy.   Pulmonary:      Effort: Pulmonary effort is normal.      Breath sounds: Normal breath sounds.   Cardiovascular:      Normal rate. Regular rhythm.      Murmurs: There is a grade 2/6 systolic murmur.   Pulses:     Intact distal pulses.   Edema:     Peripheral edema absent.   Abdominal:      Palpations: Abdomen is soft.      Tenderness: There is no abdominal tenderness.   Musculoskeletal: Normal range of motion.      Cervical back: Normal range of motion. Skin:     General: Skin is warm and dry.      Findings: No rash.   Neurological:      General: No focal deficit present.      Mental Status: Alert and oriented to person, place, and time.      Cranial Nerves: No cranial nerve deficit.   Psychiatric:         Behavior: Behavior normal.         Thought Content: Thought content normal.         Judgment: Judgment normal.           ECG 12 Lead    Date/Time: 7/14/2022 1:28 PM  Performed by: Albin Barriga MD  Authorized by: Albin Barriga MD   Comparison: compared with previous ECG   Similar to previous ECG  Comparison to previous ECG: SR replaces AF  Rhythm: sinus rhythm  Conduction: conduction normal  ST Segments: ST segments normal  T Waves: T waves normal  QRS axis: normal  Other: no other findings    Clinical impression: normal ECG              Assessment:       Diagnosis Plan   1. Coronary artery disease involving native coronary artery of native heart without angina pectoris  ECG 12 Lead    Adult Transthoracic Echo Complete W/ Cont if Necessary Per Protocol   2. Chronic diastolic (congestive) heart failure (HCC)  Adult Transthoracic Echo Complete W/ Cont if Necessary Per Protocol   3. Paroxysmal atrial fibrillation (HCC)  Adult Transthoracic Echo Complete W/ Cont if Necessary Per Protocol   4. Nonbacterial  thrombotic endocarditis     5. Microcytic anemia     6. Myeloproliferative disorder (HCC)     7. Type 2 diabetes mellitus with other circulatory complication, without long-term current use of insulin (HCC)            Plan:       1. Coronary Artery Disease  Assessment  • The patient has no angina    Subjective - Objective  • There has been a previous stent procedure using LEFTY 2/2022  • Current antiplatelet therapy includes clopidogrel 75 mg  • The patient qualifies for cardiac rehabilitation, and has been referred to cardiac rehab      She suffered a non-STEMI in the setting of profound anemia. She is s/p LEFTY x 2; she has a long 70% lesion in the mid-distal LAD which is being treated medically. She is on clopidogrel for monotherapy as she's anticoagulated; we can switch to aspirin in February 2023.    She is on atorvastatin.    2.  She is euvolemic today.  She would like to take less diuretic, and when I suggested cutting back to 3 times per week, her daughter reminded me that she became fluid overloaded when she held it for even one day.  For that reason, I feel that we have to remain on the current dose.  I do want to get an echocardiogram at the time of her next visit so that we have a study in our system.  A study from 2021 reported an ejection fraction of 55%, but at the time of her non-STEMI, it was reported to be 45-50%.     3. She is in sinus rhythm today. She's on metoprolol and apixaban.    4.  I cannot see any of the images from the echocardiograms performed at other institutions.  One hospital felt that she had a 4 x 5 mm lesion on the ventricular surface of the anterior mitral valve leaflet, but another institution felt that it was a torn cord.  Regardless, she had negative blood cultures and she is anticoagulated.  Her last echo did not report significant mitral regurgitation.  Again, we will get an echo at the time of our next office visit.    5/6.  She is being followed closely by Dr. Reinoso.  Her  hemoglobin appears to be stable.      Sincerely,       Albin Barriga MD

## 2022-07-19 ENCOUNTER — SPECIALTY PHARMACY (OUTPATIENT)
Dept: ONCOLOGY | Facility: CLINIC | Age: 82
End: 2022-07-19

## 2022-07-19 NOTE — PROGRESS NOTES
Specialty Pharmacy Refill Coordination Note     Catherine is a 82 y.o. female contacted today regarding refills of Jakafi specialty medication(s).    Reviewed and verified with patient's daughter, Ila    Specialty medication(s) and dose(s) confirmed: yes  Jakafi 15 mg twice per day    Refill Questions    Flowsheet Row Most Recent Value   Changes to allergies? No   Changes to medications? No   New conditions since last clinic visit No   Unplanned office visit, urgent care, ED, or hospital admission in the last 4 weeks  No   How does patient/caregiver feel medication is working? Good   Financial problems or insurance changes  No   Since the previous refill, were any specialty medication doses or scheduled injections missed or delayed?  No   Does this patient require a clinical escalation to a pharmacist? No          Delivery Questions    Flowsheet Row Most Recent Value   Delivery method Other (Comment)  [Beeline to pts address on 7/22-Please call Ila at [phone] if unable to deliver to room #]   Delivery address correct? Yes  [Ship to home address]   Delivery phone number [phone]   Preferred delivery time? AM   Number of medications in delivery 1   Medication being filled and delivered Jakafi   Doses left of specialty medications 7 days   Is there any medication that is due not being filled? No   Supplies needed? No supplies needed   Cooler needed? No   Do any medications need mixed or dated? No   Copay form of payment Payment plan already set up   Additional comments $0 copay-Insurance pays 100%   Questions or concerns for the pharmacist? No   Explain any questions or concerns for the pharmacist N/A   Are any medications first time fills? No        Jakafi delivery coordinated with Ila for 7/22/22 to pts home address. $0 copay with insurance. Her last delivery was on 6//24/22. No issues or concerns to report today.     Follow-up: 21 day(s)     Isi Mao  Specialty Pharmacy Technician

## 2022-07-20 RX ORDER — FUROSEMIDE 40 MG/1
40 TABLET ORAL DAILY
Qty: 90 TABLET | Refills: 3 | Status: ON HOLD | OUTPATIENT
Start: 2022-07-20 | End: 2023-01-26 | Stop reason: SDUPTHER

## 2022-08-09 ENCOUNTER — APPOINTMENT (OUTPATIENT)
Dept: LAB | Facility: HOSPITAL | Age: 82
End: 2022-08-09

## 2022-08-09 ENCOUNTER — TELEPHONE (OUTPATIENT)
Dept: ONCOLOGY | Facility: CLINIC | Age: 82
End: 2022-08-09

## 2022-08-09 NOTE — TELEPHONE ENCOUNTER
Caller: RAY RUGGIERO    Relationship to patient: DAUGHTER    Best call back number: [phone]    Chief complaint: CANC./[facility]. AS [facility] IS TRYING TO KEEP COVID DOWN & REQUESTING RESIDENTS STAY IN THEIR ROOMS AS MUCH AS POSSIBLE.    Type of visit:LAB/FOLLOW UP 1    Requested date: RAY WILL CALL BACK TO [place]. WHEN THINGS SETTLE.     If rescheduling, when is the original appointment: 8/9/2022

## 2022-08-30 ENCOUNTER — SPECIALTY PHARMACY (OUTPATIENT)
Dept: ONCOLOGY | Facility: CLINIC | Age: 82
End: 2022-08-30

## 2022-08-30 NOTE — PROGRESS NOTES
Specialty Pharmacy Refill Coordination Note     Catherine is a 82 y.o. female contacted today regarding refills of Jakafi specialty medication(s).    Reviewed and verified with patient:       Specialty medication(s) and dose(s) confirmed: yes  Jakafi 15 mg twice per day    Refill Questions    Flowsheet Row Most Recent Value   Changes to allergies? No   Changes to medications? No   New conditions since last clinic visit No   Unplanned office visit, urgent care, ED, or hospital admission in the last 4 weeks  No   How does patient/caregiver feel medication is working? Good   Financial problems or insurance changes  No   Since the previous refill, were any specialty medication doses or scheduled injections missed or delayed?  No   Does this patient require a clinical escalation to a pharmacist? No          Delivery Questions    Flowsheet Row Most Recent Value   Delivery method --  [Beeline to pts address on 8/31-Please call Ila at [phone] if unable to deliver to room #]   Delivery address correct? Yes  [Ship to home address]   Delivery phone number [phone]   Preferred delivery time? AM   Number of medications in delivery 1   Medication being filled and delivered Jakafi   Doses left of specialty medications 2 days (3 VM's were left for Ila to schedule delivery (8/17, 8/23, and 8/24)   Is there any medication that is due not being filled? No   Supplies needed? No supplies needed   Cooler needed? No   Do any medications need mixed or dated? No   Copay form of payment Payment plan already set up   Additional comments $0 copay-Insurance pays 100%   Questions or concerns for the pharmacist? No   Explain any questions or concerns for the pharmacist N/A   Are any medications first time fills? No        Jakafi delivery coordinated with Ila, pts daughter, for 8/31/22 to pts home address. $0 copay with insurance. Her last delivery was on 7/22/22. I attempted to contact Ila 3 times and had to leave  voicemails each time. No return call until today and she called the [facility] requesting a refill. No questions or concerns today.     Follow-up: 21 day(s)     Isi Mao  Specialty Pharmacy Technician

## 2022-08-31 ENCOUNTER — APPOINTMENT (OUTPATIENT)
Dept: GENERAL RADIOLOGY | Facility: HOSPITAL | Age: 82
End: 2022-08-31

## 2022-08-31 ENCOUNTER — HOSPITAL ENCOUNTER (INPATIENT)
Facility: HOSPITAL | Age: 82
LOS: 3 days | Discharge: HOME | End: 2022-09-04
Attending: EMERGENCY MEDICINE | Admitting: STUDENT IN AN ORGANIZED HEALTH CARE EDUCATION/TRAINING PROGRAM

## 2022-08-31 DIAGNOSIS — Z95.5 S/P CORONARY ARTERY STENT PLACEMENT: ICD-10-CM

## 2022-08-31 DIAGNOSIS — R07.9 CHEST PAIN WITH HIGH RISK FOR CARDIAC ETIOLOGY: ICD-10-CM

## 2022-08-31 DIAGNOSIS — G47.34 NOCTURNAL HYPOXIA: Primary | ICD-10-CM

## 2022-08-31 DIAGNOSIS — I50.9 ACUTE ON CHRONIC CONGESTIVE HEART FAILURE, UNSPECIFIED HEART FAILURE TYPE: ICD-10-CM

## 2022-08-31 PROBLEM — I50.33 ACUTE ON CHRONIC DIASTOLIC HEART FAILURE: Status: ACTIVE | Noted: 2022-07-14

## 2022-08-31 LAB
ALBUMIN SERPL-MCNC: 4.5 G/DL (ref 3.5–5.2)
ALBUMIN/GLOB SERPL: 1.9 G/DL
ALP SERPL-CCNC: 91 U/L (ref 39–117)
ALT SERPL W P-5'-P-CCNC: 28 U/L (ref 1–33)
ANION GAP SERPL CALCULATED.3IONS-SCNC: 12.4 MMOL/L (ref 5–15)
ANISOCYTOSIS BLD QL: ABNORMAL
AST SERPL-CCNC: 16 U/L (ref 1–32)
BASOPHILS # BLD MANUAL: 0.49 10*3/MM3 (ref 0–0.2)
BASOPHILS NFR BLD MANUAL: 2 % (ref 0–1.5)
BILIRUB SERPL-MCNC: 0.4 MG/DL (ref 0–1.2)
BUN SERPL-MCNC: 34 MG/DL (ref 8–23)
BUN/CREAT SERPL: 29.1 (ref 7–25)
CALCIUM SPEC-SCNC: 9.2 MG/DL (ref 8.6–10.5)
CHLORIDE SERPL-SCNC: 104 MMOL/L (ref 98–107)
CO2 SERPL-SCNC: 20.6 MMOL/L (ref 22–29)
CREAT SERPL-MCNC: 1.17 MG/DL (ref 0.57–1)
DEPRECATED RDW RBC AUTO: 58.3 FL (ref 37–54)
EGFRCR SERPLBLD CKD-EPI 2021: 46.1 ML/MIN/1.73
ERYTHROCYTE [DISTWIDTH] IN BLOOD BY AUTOMATED COUNT: 16 % (ref 12.3–15.4)
GLOBULIN UR ELPH-MCNC: 2.4 GM/DL
GLUCOSE SERPL-MCNC: 204 MG/DL (ref 65–99)
HCT VFR BLD AUTO: 27.6 % (ref 34–46.6)
HGB BLD-MCNC: 9.3 G/DL (ref 12–15.9)
LIPASE SERPL-CCNC: 38 U/L (ref 13–60)
LYMPHOCYTES # BLD MANUAL: 2.24 10*3/MM3 (ref 0.7–3.1)
LYMPHOCYTES NFR BLD MANUAL: 7.1 % (ref 5–12)
MACROCYTES BLD QL SMEAR: ABNORMAL
MCH RBC QN AUTO: 34.7 PG (ref 26.6–33)
MCHC RBC AUTO-ENTMCNC: 33.7 G/DL (ref 31.5–35.7)
MCV RBC AUTO: 103 FL (ref 79–97)
MONOCYTES # BLD: 1.73 10*3/MM3 (ref 0.1–0.9)
NEUTROPHILS # BLD AUTO: 19.88 10*3/MM3 (ref 1.7–7)
NEUTROPHILS NFR BLD MANUAL: 81.6 % (ref 42.7–76)
NRBC BLD AUTO-RTO: 0.2 /100 WBC (ref 0–0.2)
NT-PROBNP SERPL-MCNC: 2589 PG/ML (ref 0–1800)
PLAT MORPH BLD: NORMAL
PLATELET # BLD AUTO: 249 10*3/MM3 (ref 140–450)
PMV BLD AUTO: 10.8 FL (ref 6–12)
POTASSIUM SERPL-SCNC: 4.5 MMOL/L (ref 3.5–5.2)
PROT SERPL-MCNC: 6.9 G/DL (ref 6–8.5)
RBC # BLD AUTO: 2.68 10*6/MM3 (ref 3.77–5.28)
SODIUM SERPL-SCNC: 137 MMOL/L (ref 136–145)
TROPONIN T SERPL-MCNC: 0.01 NG/ML (ref 0–0.03)
VARIANT LYMPHS NFR BLD MANUAL: 9.2 % (ref 19.6–45.3)
WBC MORPH BLD: NORMAL
WBC NRBC COR # BLD: 24.36 10*3/MM3 (ref 3.4–10.8)

## 2022-08-31 PROCEDURE — 99284 EMERGENCY DEPT VISIT MOD MDM: CPT

## 2022-08-31 PROCEDURE — 83880 ASSAY OF NATRIURETIC PEPTIDE: CPT | Performed by: EMERGENCY MEDICINE

## 2022-08-31 PROCEDURE — G0378 HOSPITAL OBSERVATION PER HR: HCPCS

## 2022-08-31 PROCEDURE — 84484 ASSAY OF TROPONIN QUANT: CPT | Performed by: EMERGENCY MEDICINE

## 2022-08-31 PROCEDURE — 93005 ELECTROCARDIOGRAM TRACING: CPT | Performed by: EMERGENCY MEDICINE

## 2022-08-31 PROCEDURE — 80053 COMPREHEN METABOLIC PANEL: CPT | Performed by: EMERGENCY MEDICINE

## 2022-08-31 PROCEDURE — 93010 ELECTROCARDIOGRAM REPORT: CPT | Performed by: INTERNAL MEDICINE

## 2022-08-31 PROCEDURE — 71045 X-RAY EXAM CHEST 1 VIEW: CPT

## 2022-08-31 PROCEDURE — 25010000002 FUROSEMIDE PER 20 MG: Performed by: EMERGENCY MEDICINE

## 2022-08-31 PROCEDURE — 85007 BL SMEAR W/DIFF WBC COUNT: CPT | Performed by: EMERGENCY MEDICINE

## 2022-08-31 PROCEDURE — 83690 ASSAY OF LIPASE: CPT | Performed by: EMERGENCY MEDICINE

## 2022-08-31 PROCEDURE — 85025 COMPLETE CBC W/AUTO DIFF WBC: CPT | Performed by: EMERGENCY MEDICINE

## 2022-08-31 RX ORDER — SODIUM CHLORIDE 0.9 % (FLUSH) 0.9 %
10 SYRINGE (ML) INJECTION AS NEEDED
Status: DISCONTINUED | OUTPATIENT
Start: 2022-08-31 | End: 2022-09-04 | Stop reason: HOSPADM

## 2022-08-31 RX ORDER — FUROSEMIDE 10 MG/ML
80 INJECTION INTRAMUSCULAR; INTRAVENOUS ONCE
Status: COMPLETED | OUTPATIENT
Start: 2022-08-31 | End: 2022-08-31

## 2022-08-31 RX ORDER — FUROSEMIDE 10 MG/ML
40 INJECTION INTRAMUSCULAR; INTRAVENOUS EVERY 12 HOURS
Status: DISCONTINUED | OUTPATIENT
Start: 2022-09-01 | End: 2022-09-01

## 2022-08-31 RX ADMIN — FUROSEMIDE 80 MG: 10 INJECTION, SOLUTION INTRAMUSCULAR; INTRAVENOUS at 23:29

## 2022-09-01 ENCOUNTER — APPOINTMENT (OUTPATIENT)
Dept: CARDIOLOGY | Facility: HOSPITAL | Age: 82
End: 2022-09-01

## 2022-09-01 LAB
ANION GAP SERPL CALCULATED.3IONS-SCNC: 12.8 MMOL/L (ref 5–15)
AORTIC DIMENSIONLESS INDEX: 0.7 (DI)
ASCENDING AORTA: 2.8 CM
BH CV ECHO MEAS - ACS: 1.54 CM
BH CV ECHO MEAS - AO MAX PG: 16.1 MMHG
BH CV ECHO MEAS - AO MEAN PG: 7.5 MMHG
BH CV ECHO MEAS - AO ROOT DIAM: 2.8 CM
BH CV ECHO MEAS - AO V2 MAX: 200.7 CM/SEC
BH CV ECHO MEAS - AO V2 VTI: 34.9 CM
BH CV ECHO MEAS - AVA(I,D): 2.37 CM2
BH CV ECHO MEAS - CONTRAST EF 4CH: 57 CM2
BH CV ECHO MEAS - EDV(CUBED): 82.4 ML
BH CV ECHO MEAS - EDV(MOD-SP2): 75 ML
BH CV ECHO MEAS - EDV(MOD-SP4): 74 ML
BH CV ECHO MEAS - EF(MOD-BP): 57.4 %
BH CV ECHO MEAS - EF(MOD-SP2): 54.7 %
BH CV ECHO MEAS - EF(MOD-SP4): 58.1 %
BH CV ECHO MEAS - ESV(CUBED): 30 ML
BH CV ECHO MEAS - ESV(MOD-SP2): 34 ML
BH CV ECHO MEAS - ESV(MOD-SP4): 31 ML
BH CV ECHO MEAS - FS: 28.6 %
BH CV ECHO MEAS - IVS/LVPW: 1.03 CM
BH CV ECHO MEAS - IVSD: 1.05 CM
BH CV ECHO MEAS - LAT PEAK E' VEL: 8.9 CM/SEC
BH CV ECHO MEAS - LV DIASTOLIC VOL/BSA (35-75): 43.3 CM2
BH CV ECHO MEAS - LV MASS(C)D: 151.8 GRAMS
BH CV ECHO MEAS - LV MAX PG: 8.9 MMHG
BH CV ECHO MEAS - LV MEAN PG: 4.3 MMHG
BH CV ECHO MEAS - LV SYSTOLIC VOL/BSA (12-30): 18.1 CM2
BH CV ECHO MEAS - LV V1 MAX: 149 CM/SEC
BH CV ECHO MEAS - LV V1 VTI: 25.3 CM
BH CV ECHO MEAS - LVIDD: 4.4 CM
BH CV ECHO MEAS - LVIDS: 3.1 CM
BH CV ECHO MEAS - LVOT AREA: 3.3 CM2
BH CV ECHO MEAS - LVOT DIAM: 2.04 CM
BH CV ECHO MEAS - LVPWD: 1.02 CM
BH CV ECHO MEAS - MED PEAK E' VEL: 4.2 CM/SEC
BH CV ECHO MEAS - MR MAX PG: 67.9 MMHG
BH CV ECHO MEAS - MR MAX VEL: 412.1 CM/SEC
BH CV ECHO MEAS - MV A DUR: 0.1 SEC
BH CV ECHO MEAS - MV A MAX VEL: 109.6 CM/SEC
BH CV ECHO MEAS - MV DEC SLOPE: 389.7 CM/SEC2
BH CV ECHO MEAS - MV DEC TIME: 0.24 MSEC
BH CV ECHO MEAS - MV E MAX VEL: 86.9 CM/SEC
BH CV ECHO MEAS - MV E/A: 0.79
BH CV ECHO MEAS - MV MAX PG: 4.8 MMHG
BH CV ECHO MEAS - MV MEAN PG: 2.8 MMHG
BH CV ECHO MEAS - MV P1/2T: 65.3 MSEC
BH CV ECHO MEAS - MV V2 VTI: 24.3 CM
BH CV ECHO MEAS - MVA(P1/2T): 3.4 CM2
BH CV ECHO MEAS - MVA(VTI): 3.4 CM2
BH CV ECHO MEAS - PA ACC TIME: 0.1 SEC
BH CV ECHO MEAS - PA PR(ACCEL): 36.2 MMHG
BH CV ECHO MEAS - PA V2 MAX: 127 CM/SEC
BH CV ECHO MEAS - QP/QS: 0.38
BH CV ECHO MEAS - RAP SYSTOLE: 3 MMHG
BH CV ECHO MEAS - RV MAX PG: 3.6 MMHG
BH CV ECHO MEAS - RV V1 MAX: 95.5 CM/SEC
BH CV ECHO MEAS - RV V1 VTI: 16.4 CM
BH CV ECHO MEAS - RVOT DIAM: 1.57 CM
BH CV ECHO MEAS - RVSP: 19.6 MMHG
BH CV ECHO MEAS - SI(MOD-SP2): 24 ML/M2
BH CV ECHO MEAS - SI(MOD-SP4): 25.2 ML/M2
BH CV ECHO MEAS - SV(LVOT): 82.7 ML
BH CV ECHO MEAS - SV(MOD-SP2): 41 ML
BH CV ECHO MEAS - SV(MOD-SP4): 43 ML
BH CV ECHO MEAS - SV(RVOT): 31.8 ML
BH CV ECHO MEAS - TAPSE (>1.6): 2.12 CM
BH CV ECHO MEAS - TR MAX PG: 16.6 MMHG
BH CV ECHO MEAS - TR MAX VEL: 203.6 CM/SEC
BH CV ECHO MEASUREMENTS AVERAGE E/E' RATIO: 13.27
BH CV VAS BP RIGHT ARM: NORMAL MMHG
BH CV XLRA - RV BASE: 2.44 CM
BH CV XLRA - RV LENGTH: 6.3 CM
BH CV XLRA - RV MID: 2.21 CM
BH CV XLRA - TDI S': 13.2 CM/SEC
BUN SERPL-MCNC: 33 MG/DL (ref 8–23)
BUN/CREAT SERPL: 34.4 (ref 7–25)
CALCIUM SPEC-SCNC: 9.1 MG/DL (ref 8.6–10.5)
CHLORIDE SERPL-SCNC: 105 MMOL/L (ref 98–107)
CHOLEST SERPL-MCNC: 112 MG/DL (ref 0–200)
CO2 SERPL-SCNC: 24.2 MMOL/L (ref 22–29)
CREAT SERPL-MCNC: 0.96 MG/DL (ref 0.57–1)
DEPRECATED RDW RBC AUTO: 62 FL (ref 37–54)
EGFRCR SERPLBLD CKD-EPI 2021: 58.5 ML/MIN/1.73
ERYTHROCYTE [DISTWIDTH] IN BLOOD BY AUTOMATED COUNT: 16.2 % (ref 12.3–15.4)
GLUCOSE BLDC GLUCOMTR-MCNC: 132 MG/DL (ref 70–130)
GLUCOSE BLDC GLUCOMTR-MCNC: 140 MG/DL (ref 70–130)
GLUCOSE BLDC GLUCOMTR-MCNC: 161 MG/DL (ref 70–130)
GLUCOSE SERPL-MCNC: 113 MG/DL (ref 65–99)
HBA1C MFR BLD: 6.6 % (ref 4.8–5.6)
HCT VFR BLD AUTO: 28 % (ref 34–46.6)
HDLC SERPL-MCNC: 37 MG/DL (ref 40–60)
HGB BLD-MCNC: 9.1 G/DL (ref 12–15.9)
LDLC SERPL CALC-MCNC: 48 MG/DL (ref 0–100)
LDLC/HDLC SERPL: 1.18 {RATIO}
LEFT ATRIUM VOLUME INDEX: 49.4 ML/M2
MAGNESIUM SERPL-MCNC: 1.7 MG/DL (ref 1.6–2.4)
MAXIMAL PREDICTED HEART RATE: 136 BPM
MCH RBC QN AUTO: 34.2 PG (ref 26.6–33)
MCHC RBC AUTO-ENTMCNC: 32.5 G/DL (ref 31.5–35.7)
MCV RBC AUTO: 105.3 FL (ref 79–97)
PLATELET # BLD AUTO: 238 10*3/MM3 (ref 140–450)
PMV BLD AUTO: 10.8 FL (ref 6–12)
POTASSIUM SERPL-SCNC: 4.2 MMOL/L (ref 3.5–5.2)
QT INTERVAL: 366 MS
QT INTERVAL: 392 MS
RBC # BLD AUTO: 2.66 10*6/MM3 (ref 3.77–5.28)
SARS-COV-2 ORF1AB RESP QL NAA+PROBE: NOT DETECTED
SINUS: 2.9 CM
SODIUM SERPL-SCNC: 142 MMOL/L (ref 136–145)
STJ: 2.37 CM
STRESS TARGET HR: 116 BPM
TRIGL SERPL-MCNC: 157 MG/DL (ref 0–150)
TROPONIN T SERPL-MCNC: 0.03 NG/ML (ref 0–0.03)
VLDLC SERPL-MCNC: 27 MG/DL (ref 5–40)
WBC NRBC COR # BLD: 29.57 10*3/MM3 (ref 3.4–10.8)

## 2022-09-01 PROCEDURE — 63710000001 RUXOLITINIB 15 MG TABLET: Performed by: STUDENT IN AN ORGANIZED HEALTH CARE EDUCATION/TRAINING PROGRAM

## 2022-09-01 PROCEDURE — 36415 COLL VENOUS BLD VENIPUNCTURE: CPT | Performed by: STUDENT IN AN ORGANIZED HEALTH CARE EDUCATION/TRAINING PROGRAM

## 2022-09-01 PROCEDURE — 63710000001 INSULIN LISPRO (HUMAN) PER 5 UNITS: Performed by: STUDENT IN AN ORGANIZED HEALTH CARE EDUCATION/TRAINING PROGRAM

## 2022-09-01 PROCEDURE — 80061 LIPID PANEL: CPT | Performed by: STUDENT IN AN ORGANIZED HEALTH CARE EDUCATION/TRAINING PROGRAM

## 2022-09-01 PROCEDURE — 97162 PT EVAL MOD COMPLEX 30 MIN: CPT

## 2022-09-01 PROCEDURE — 84484 ASSAY OF TROPONIN QUANT: CPT | Performed by: STUDENT IN AN ORGANIZED HEALTH CARE EDUCATION/TRAINING PROGRAM

## 2022-09-01 PROCEDURE — 93010 ELECTROCARDIOGRAM REPORT: CPT | Performed by: INTERNAL MEDICINE

## 2022-09-01 PROCEDURE — 80048 BASIC METABOLIC PNL TOTAL CA: CPT | Performed by: STUDENT IN AN ORGANIZED HEALTH CARE EDUCATION/TRAINING PROGRAM

## 2022-09-01 PROCEDURE — 83735 ASSAY OF MAGNESIUM: CPT | Performed by: STUDENT IN AN ORGANIZED HEALTH CARE EDUCATION/TRAINING PROGRAM

## 2022-09-01 PROCEDURE — C9399 UNCLASSIFIED DRUGS OR BIOLOG: HCPCS | Performed by: STUDENT IN AN ORGANIZED HEALTH CARE EDUCATION/TRAINING PROGRAM

## 2022-09-01 PROCEDURE — 97110 THERAPEUTIC EXERCISES: CPT

## 2022-09-01 PROCEDURE — 25010000002 PERFLUTREN (DEFINITY) 8.476 MG IN SODIUM CHLORIDE (PF) 0.9 % 10 ML INJECTION: Performed by: STUDENT IN AN ORGANIZED HEALTH CARE EDUCATION/TRAINING PROGRAM

## 2022-09-01 PROCEDURE — 93306 TTE W/DOPPLER COMPLETE: CPT | Performed by: INTERNAL MEDICINE

## 2022-09-01 PROCEDURE — U0004 COV-19 TEST NON-CDC HGH THRU: HCPCS | Performed by: EMERGENCY MEDICINE

## 2022-09-01 PROCEDURE — 82962 GLUCOSE BLOOD TEST: CPT

## 2022-09-01 PROCEDURE — 99222 1ST HOSP IP/OBS MODERATE 55: CPT | Performed by: INTERNAL MEDICINE

## 2022-09-01 PROCEDURE — 85027 COMPLETE CBC AUTOMATED: CPT | Performed by: STUDENT IN AN ORGANIZED HEALTH CARE EDUCATION/TRAINING PROGRAM

## 2022-09-01 PROCEDURE — 93306 TTE W/DOPPLER COMPLETE: CPT

## 2022-09-01 PROCEDURE — 83036 HEMOGLOBIN GLYCOSYLATED A1C: CPT | Performed by: STUDENT IN AN ORGANIZED HEALTH CARE EDUCATION/TRAINING PROGRAM

## 2022-09-01 PROCEDURE — 93005 ELECTROCARDIOGRAM TRACING: CPT | Performed by: STUDENT IN AN ORGANIZED HEALTH CARE EDUCATION/TRAINING PROGRAM

## 2022-09-01 RX ORDER — LISINOPRIL 10 MG/1
10 TABLET ORAL DAILY
Status: DISCONTINUED | OUTPATIENT
Start: 2022-09-01 | End: 2022-09-02

## 2022-09-01 RX ORDER — DEXTROSE MONOHYDRATE 25 G/50ML
25 INJECTION, SOLUTION INTRAVENOUS
Status: DISCONTINUED | OUTPATIENT
Start: 2022-09-01 | End: 2022-09-04 | Stop reason: HOSPADM

## 2022-09-01 RX ORDER — NICOTINE POLACRILEX 4 MG
15 LOZENGE BUCCAL
Status: DISCONTINUED | OUTPATIENT
Start: 2022-09-01 | End: 2022-09-04 | Stop reason: HOSPADM

## 2022-09-01 RX ORDER — FUROSEMIDE 10 MG/ML
40 INJECTION INTRAMUSCULAR; INTRAVENOUS EVERY 12 HOURS
Status: DISCONTINUED | OUTPATIENT
Start: 2022-09-02 | End: 2022-09-03

## 2022-09-01 RX ORDER — INSULIN LISPRO 100 [IU]/ML
0-9 INJECTION, SOLUTION INTRAVENOUS; SUBCUTANEOUS
Status: DISCONTINUED | OUTPATIENT
Start: 2022-09-01 | End: 2022-09-04 | Stop reason: HOSPADM

## 2022-09-01 RX ORDER — ATORVASTATIN CALCIUM 80 MG/1
80 TABLET, FILM COATED ORAL NIGHTLY
Status: DISCONTINUED | OUTPATIENT
Start: 2022-09-01 | End: 2022-09-04 | Stop reason: HOSPADM

## 2022-09-01 RX ORDER — CLOPIDOGREL BISULFATE 75 MG/1
75 TABLET ORAL DAILY
Status: DISCONTINUED | OUTPATIENT
Start: 2022-09-01 | End: 2022-09-04 | Stop reason: HOSPADM

## 2022-09-01 RX ORDER — PANTOPRAZOLE SODIUM 40 MG/1
40 TABLET, DELAYED RELEASE ORAL DAILY
Status: DISCONTINUED | OUTPATIENT
Start: 2022-09-01 | End: 2022-09-04 | Stop reason: HOSPADM

## 2022-09-01 RX ORDER — METOPROLOL SUCCINATE 25 MG/1
25 TABLET, EXTENDED RELEASE ORAL 2 TIMES DAILY
Status: DISCONTINUED | OUTPATIENT
Start: 2022-09-01 | End: 2022-09-03

## 2022-09-01 RX ADMIN — CLOPIDOGREL 75 MG: 75 TABLET, FILM COATED ORAL at 13:16

## 2022-09-01 RX ADMIN — LISINOPRIL 10 MG: 10 TABLET ORAL at 13:16

## 2022-09-01 RX ADMIN — RUXOLITINIB 15 MG: 15 TABLET ORAL at 20:29

## 2022-09-01 RX ADMIN — PERFLUTREN 2 ML: 6.52 INJECTION, SUSPENSION INTRAVENOUS at 10:05

## 2022-09-01 RX ADMIN — APIXABAN 5 MG: 5 TABLET, FILM COATED ORAL at 13:16

## 2022-09-01 RX ADMIN — APIXABAN 5 MG: 5 TABLET, FILM COATED ORAL at 20:29

## 2022-09-01 RX ADMIN — SERTRALINE HYDROCHLORIDE 50 MG: 50 TABLET, FILM COATED ORAL at 13:16

## 2022-09-01 RX ADMIN — INSULIN LISPRO 2 UNITS: 100 INJECTION, SOLUTION INTRAVENOUS; SUBCUTANEOUS at 18:41

## 2022-09-01 RX ADMIN — METOPROLOL SUCCINATE 25 MG: 25 TABLET, EXTENDED RELEASE ORAL at 13:16

## 2022-09-01 RX ADMIN — ATORVASTATIN CALCIUM 80 MG: 80 TABLET, FILM COATED ORAL at 20:29

## 2022-09-01 RX ADMIN — PANTOPRAZOLE SODIUM 40 MG: 40 TABLET, DELAYED RELEASE ORAL at 13:16

## 2022-09-01 RX ADMIN — Medication 10 ML: at 20:29

## 2022-09-01 RX ADMIN — METOPROLOL SUCCINATE 25 MG: 25 TABLET, EXTENDED RELEASE ORAL at 20:29

## 2022-09-01 NOTE — CASE MANAGEMENT/SOCIAL WORK
Discharge Planning Assessment  [facility]     Patient Name: Catherine Byoer  MRN: [id number]  Today's Date: 9/1/2022    Admit Date: 8/31/2022     Discharge Needs Assessment     Row Name 09/01/22 1151       Living Environment    People in Home alone    Current Living Arrangements assisted living facility    Primary Care Provided by self    Provides Primary Care For no one    Family Caregiver if Needed child(margarette), adult    Family Caregiver Names Daughter Ila Boyer [phone]    Quality of Family Relationships helpful    Able to Return to Prior Arrangements yes       Resource/Environmental Concerns    Resource/Environmental Concerns none    Transportation Concerns none       Transition Planning    Patient/Family Anticipates Transition to home    Patient/Family Anticipated Services at Transition none    Transportation Anticipated family or friend will provide       Discharge Needs Assessment    Readmission Within the Last 30 Days no previous admission in last 30 days    Equipment Currently Used at Home cane, straight    Concerns to be Addressed denies needs/concerns at this time    Anticipated Changes Related to Illness none    Equipment Needed After Discharge none               Discharge Plan     Row Name 09/01/22 2721       Plan    Plan Return to [facility]    Patient/Family in Agreement with Plan yes    Plan Comments Spoke with patient and daughter Ila Boyer [phone] at bedside.  Patient lives in [place] at [facility].  She uses a cane, has scales for daily weights, has used HH in the past and has been to SNF in [place].  She takes her own medications.  Pharmacy is [facility].  Call to Katina/Admissions at [facility] [phone] and awaiting return call to confirm PCP/APRN and criteria for patient to return.  Jeffry Thorpe helps with driving or needs.  She plans to return to [facility].  CCP will follow.  Zainab ZENG              Continued Care and Services -  Admitted Since 8/31/2022     Destination Coordination complete.    Service Provider Request Status Selected Services Address Phone Fax Patient Preferred    [facility]   Selected Assisted Living [address] [id number] [phone] --            Selected Continued Care - Episodes Includes selections from active Coordinated Care Management episodes    Oncology Episode start date: 5/18/2022   There are no active outsourced providers for this episode.               Expected Discharge Date and Time     Expected Discharge Date Expected Discharge Time    Sep 5, 2022          Demographic Summary     Row Name 09/01/22 1151       General Information    Admission Type observation    Arrived From home    Referral Source admission list    Reason for Consult discharge planning    Preferred Language English               Functional Status     Row Name 09/01/22 1151       Functional Status    Usual Activity Tolerance moderate    Current Activity Tolerance moderate       Functional Status, IADL    Medications independent    Meal Preparation assistive person    Housekeeping assistive person    Laundry assistive person    Shopping assistive person       Mental Status    General Appearance WDL WDL       Mental Status Summary    Recent Changes in Mental Status/Cognitive Functioning no changes                      Becky S. [place], RN

## 2022-09-01 NOTE — PLAN OF CARE
Goal Outcome Evaluation:  Plan of Care Reviewed With: (P) patient        Progress: (P) improving  Outcome Evaluation: (P) Pt admitted to hospital for chest pain with CHF, HTN, and CAD. Pt recieved supine in bed and is agreeable to PT treatment at this time. Pt lives at an assisted living facility and as IND in all mobility with a SPC and ADLs. Pt reports having no recent falls. Pt was CGA for all bed mobility and transfers. Pt ambulated 80ft with RW CGA with no LOB or unsteadiness. Pt plans to DC back to assisted living facility and PT is in agreement with DC plans at this time. Pt appears to be at her baseline at this time and pt agrees with this assessment. Skilled PT is not needed at this time. Please re-order PT if pt status changes. Pt left in chair with all needs in reach.

## 2022-09-01 NOTE — ED TRIAGE NOTES
.Pt masked on arrival, staff masked    Pt from [facility] facility via ems, staff called for upper chest pain that started about an hour ago, worse when laying down, RA sat was 83 with improvement to 94 on 3L NC

## 2022-09-01 NOTE — H&P
Patient Name:  Catherine Boyer  YOB: 1940  MRN:  [id number]  Admit Date:  8/31/2022  Patient Care Team:  Provider, No Known as PCP - Gerard Hearn MD as Referring Physician (Medical Oncology)  Darrell Reinoso MD as Consulting Physician (Hematology and Oncology)      Subjective   History Present Illness     Chief Complaint   Patient presents with   • Chest Pain       Ms. Boyer is a 82 y.o.  woman with coronary artery disease s/p 2 LEFTY in February 2022 on eliquis and plavix, chronic diastolic heart failure, GERD, paroxysmal afib on eliquis, hypertension, myeloproliferative disorder, porcelain gallbladder, history of nonbacterial endocarditis, type 2 DM who presents from her nursing facility with chest pressure radiating to her bilateral shoulders that lasted about an hour. She reports that she tried to take some tylenol without effect. Lying back seemed to worsen the pain, but there were no other modifying factors. Her pain has since resolved.    History of Present Illness  Review of Systems   Constitutional: Negative for chills, diaphoresis and fever.   HENT: Negative for sinus pressure and sinus pain.    Eyes: Negative.    Respiratory: Positive for shortness of breath. Negative for cough.    Cardiovascular: Positive for chest pain. Negative for palpitations and leg swelling.   Gastrointestinal: Negative for abdominal pain, diarrhea, nausea and vomiting.   Endocrine: Negative for polydipsia and polyuria.   Genitourinary: Negative for dysuria, frequency and urgency.   Musculoskeletal: Negative for arthralgias and myalgias.   Skin: Negative for rash and wound.   Allergic/Immunologic: Negative.    Neurological: Negative for light-headedness and headaches.   Hematological: Negative.    Psychiatric/Behavioral: Negative for confusion and decreased concentration.        Personal History     Past Medical History:   Diagnosis Date   • Atrial fibrillation (HCC)    • Breast cancer (HCC)    • CAD  "(coronary artery disease)     NSTEMI 2/2022: 90% ostial LAD, 99% D1, 70% mid-distal LAD (medical therapy). She received two stents (2.5x18 and 2.5x26mm Force LEFTY) but I don't know which one went to which lesion.   • Carotid atherosclerosis    • Chronic diastolic (congestive) heart failure (HCC)    • GERD (gastroesophageal reflux disease)    • Glaucoma    • History of cataract    • Hypertension    • Microcytic anemia     per Dr. oleg pate office  note 6/30/22-dd   • Myeloproliferative disorder (HCC)     JAK2 positive   • Nonbacterial thrombotic endocarditis     6/2021: 4x5mm vegetation on the ventricular surface of the anterior MV, negative blood cultures   • Porcelain gallbladder    • PUD (peptic ulcer disease)    • TIA (transient ischemic attack)    • Type 2 diabetes mellitus (HCC)    • Upper GI bleed      Past Surgical History:   Procedure Laterality Date   • BREAST LUMPECTOMY  1999   • CATARACT EXTRACTION  2011     Family History   Problem Relation Age of Onset   • Ovarian cancer Mother    • Lung cancer Father    • Lung cancer Sister      Social History     Tobacco Use   • Smoking status: Former Smoker     Packs/day: 1.00     Years: 20.00     Pack years: 20.00   • Smokeless tobacco: Never Used   • Tobacco comment: 30  years ago   Substance Use Topics   • Alcohol use: Yes     Comment: \"rare\"   • Drug use: Never     No current facility-administered medications on file prior to encounter.     Current Outpatient Medications on File Prior to Encounter   Medication Sig Dispense Refill   • acetaminophen (TYLENOL) 500 MG tablet Take 500 mg by mouth Every 4 (Four) Hours As Needed.     • apixaban (ELIQUIS) 5 MG tablet tablet Take 1 tablet by mouth Every 12 (Twelve) Hours for 180 days. 60 tablet 5   • atorvastatin (LIPITOR) 80 MG tablet Take 1 tablet by mouth Every Night.     • bimatoprost (LUMIGAN) 0.01 % ophthalmic drops Apply 1 drop to eye(s) as directed by provider.     • clopidogrel (PLAVIX) 75 MG tablet Take 1 tablet " by mouth Daily.     • furosemide (LASIX) 40 MG tablet Take 1 tablet by mouth Daily. 90 tablet 3   • Jakafi 15 MG chemo tablet Take 1 tablet by mouth 2 (Two) Times a Day. 60 tablet 3   • latanoprost (XALATAN) 0.005 % ophthalmic solution      • lisinopril (PRINIVIL,ZESTRIL) 10 MG tablet Take 1 tablet by mouth Daily.     • metoprolol succinate XL (TOPROL-XL) 25 MG 24 hr tablet 25 mg 2 (Two) Times a Day.     • pantoprazole (PROTONIX) 40 MG EC tablet Take 40 mg by mouth Daily.     • sertraline (ZOLOFT) 50 MG tablet Take 50 mg by mouth Daily.       Allergies   Allergen Reactions   • Aspirin Unknown - Low Severity   • Oxycodone Unknown - Low Severity       Objective    Objective     Vital Signs  Temp:  [97.9 °F (36.6 °C)] 97.9 °F (36.6 °C)  Heart Rate:  [110] 110  Resp:  [18] 18  BP: (147)/(66) 147/66  SpO2:  [94 %] 94 %  on  Flow (L/min):  [3] 3;   Device (Oxygen Therapy): nasal cannula  There is no height or weight on file to calculate BMI.    Physical Exam  Vitals and nursing note reviewed.   Constitutional:       General: She is not in acute distress.     Appearance: She is not toxic-appearing.   HENT:      Head: Normocephalic and atraumatic.      Mouth/Throat:      Mouth: Mucous membranes are moist.      Pharynx: Oropharynx is clear. No oropharyngeal exudate.   Eyes:      Extraocular Movements: Extraocular movements intact.      Conjunctiva/sclera: Conjunctivae normal.      Pupils: Pupils are equal, round, and reactive to light.   Cardiovascular:      Rate and Rhythm: Normal rate and regular rhythm.      Heart sounds: Normal heart sounds.   Pulmonary:      Effort: Pulmonary effort is normal. No respiratory distress.      Breath sounds: Normal breath sounds. No wheezing, rhonchi or rales.   Abdominal:      General: Bowel sounds are normal. There is no distension.      Palpations: Abdomen is soft.      Tenderness: There is no abdominal tenderness. There is no guarding or rebound.   Musculoskeletal:         General: No  tenderness.      Cervical back: Normal range of motion and neck supple.      Right lower leg: No edema.      Left lower leg: No edema.   Skin:     General: Skin is warm and dry.      Capillary Refill: Capillary refill takes less than 2 seconds.      Findings: No erythema or rash.   Neurological:      General: No focal deficit present.      Mental Status: She is alert and oriented to person, place, and time.   Psychiatric:         Mood and Affect: Mood normal.         Behavior: Behavior normal.         Results Review:  I reviewed the patient's new clinical results.  I reviewed the patient's new imaging results and agree with the interpretation.  I reviewed the patient's other test results and agree with the interpretation  I personally viewed and interpreted the patient's EKG/Telemetry data  Discussed with ED provider.    Lab Results (last 24 hours)     Procedure Component Value Units Date/Time    CBC & Differential [[id number]]  (Abnormal) Collected: 08/31/22 2131    Specimen: Blood Updated: 08/31/22 2148    Narrative:      The following orders were created for panel order CBC & Differential.  Procedure                               Abnormality         Status                     ---------                               -----------         ------                     CBC Auto Differential[[id number]]        Abnormal            Final result                 Please view results for these tests on the individual orders.    Comprehensive Metabolic Panel [[id number]]  (Abnormal) Collected: 08/31/22 2131    Specimen: Blood Updated: 08/31/22 2200     Glucose 204 mg/dL      BUN 34 mg/dL      Creatinine 1.17 mg/dL      Sodium 137 mmol/L      Potassium 4.5 mmol/L      Chloride 104 mmol/L      CO2 20.6 mmol/L      Calcium 9.2 mg/dL      Total Protein 6.9 g/dL      Albumin 4.50 g/dL      ALT (SGPT) 28 U/L      AST (SGOT) 16 U/L      Alkaline Phosphatase 91 U/L      Total Bilirubin 0.4 mg/dL      Globulin 2.4 gm/dL      A/G Ratio 1.9  g/dL      BUN/Creatinine Ratio 29.1     Anion Gap 12.4 mmol/L      eGFR 46.1 mL/min/1.73      Comment: National Kidney Foundation and American Society of Nephrology (ASN) Task Force recommended calculation based on the Chronic Kidney Disease Epidemiology Collaboration (CKD-EPI) equation refit without adjustment for race.       Narrative:      GFR Normal >60  Chronic Kidney Disease <60  Kidney Failure <15      Lipase [[id number]]  (Normal) Collected: 08/31/22 2131    Specimen: Blood Updated: 08/31/22 2200     Lipase 38 U/L     Troponin [[id number]]  (Normal) Collected: 08/31/22 2131    Specimen: Blood Updated: 08/31/22 2200     Troponin T 0.010 ng/mL     Narrative:      Troponin T Reference Range:  <= 0.03 ng/mL-   Negative for AMI  >0.03 ng/mL-     Abnormal for myocardial necrosis.  Clinicians would have to utilize clinical acumen, EKG, Troponin and serial changes to determine if it is an Acute Myocardial Infarction or myocardial injury due to an underlying chronic condition.       Results may be falsely decreased if patient taking Biotin.      BNP [[id number]]  (Abnormal) Collected: 08/31/22 2131    Specimen: Blood Updated: 08/31/22 2158     proBNP 2,589.0 pg/mL     Narrative:      Among patients with dyspnea, NT-proBNP is highly sensitive for the detection of acute congestive heart failure. In addition NT-proBNP of <300 pg/ml effectively rules out acute congestive heart failure with 99% negative predictive value.    Results may be falsely decreased if patient taking Biotin.      CBC Auto Differential [[id number]]  (Abnormal) Collected: 08/31/22 2131    Specimen: Blood Updated: 08/31/22 2148     WBC 24.36 10*3/mm3      RBC 2.68 10*6/mm3      Hemoglobin 9.3 g/dL      Hematocrit 27.6 %      .0 fL      MCH 34.7 pg      MCHC 33.7 g/dL      RDW 16.0 %      RDW-SD 58.3 fl      MPV 10.8 fL      Platelets 249 10*3/mm3      nRBC 0.2 /100 WBC     Manual Differential [[id number]]  (Abnormal) Collected: 08/31/22 2131     Specimen: Blood Updated: 08/31/22 2234     Neutrophil % 81.6 %      Lymphocyte % 9.2 %      Monocyte % 7.1 %      Basophil % 2.0 %      Neutrophils Absolute 19.88 10*3/mm3      Lymphocytes Absolute 2.24 10*3/mm3      Monocytes Absolute 1.73 10*3/mm3      Basophils Absolute 0.49 10*3/mm3      Anisocytosis Slight/1+     Macrocytes Slight/1+     WBC Morphology Normal     Platelet Morphology Normal          Imaging Results (Last 24 Hours)     Procedure Component Value Units Date/Time    XR Chest 1 View [[id number]] Collected: 08/31/22 2135     Updated: 08/31/22 2157    Narrative:      CHEST SINGLE VIEW     HISTORY: Chest pain     COMPARISON: None     FINDINGS: Heart size is borderline enlarged. There are increased  interstitial markings that are suspected to represent hydrostatic  interstitial pulmonary edema. No superimposed airspace disease is  evident. There is no pneumothorax. Thoracic aorta is tortuous and aortic  vascular calcifications are present. There is advanced osteoarthritis at  both shoulders. Several osteochondral bodies are present on the left at  the level of the proximal humeral metaphysis.       Impression:      Increased interstitial markings are predominantly perihilar  and suspected to represent hydrostatic interstitial pulmonary edema.  There are no previous studies for comparison.     This report was finalized on 8/31/2022 9:54 PM by Dr. Torey Bedoya M.D.                 ECG 12 Lead   Preliminary Result   HEART RATE= 96  bpm   RR Interval= 625  ms   VA Interval= 162  ms   P Horizontal Axis= -41  deg   P Front Axis= 54  deg   QRSD Interval= 89  ms   QT Interval= 366  ms   QRS Axis= 30  deg   T Wave Axis= 42  deg   - OTHERWISE NORMAL ECG -   Sinus rhythm   Atrial premature complex   Electronically Signed By:    Date and Time of Study: 2022-08-31 21:18:08           Assessment/Plan     Active Hospital Problems    Diagnosis  POA   • Chest pain with high risk for cardiac etiology [R07.9]  Yes   •  GERD (gastroesophageal reflux disease) [K21.9]  Yes   • Hypertension [I10]  Yes   • TIA (transient ischemic attack) [G45.9]  Yes   • Acute on chronic diastolic heart failure (HCC) [I50.33]  Yes   • CAD (coronary artery disease) [I25.10]  Yes   • 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       Ms. Boyer is a 82 y.o. woman with coronary artery disease s/p 2 LEFTY in February 2022, chronic diastolic heart failure, GERD, paroxysmal afib on eliquis, hypertension, myeloproliferative disorder, porcelain gallbladder, history of nonbacterial endocarditis, type 2 DM who presents with chest pain and a mild heart failure exacerbation.    · Acute on chronic diastolic heart failure causing hypoxia-she received a dose of iv lasix in the ED, and they will be continued. She actually appears fairly euvolemic, so I suspect she won't require diuretics for long. Monitor weights, I/Os, renal function, electrolytes.   · Chest pain-initial troponin was low and ekg looks okay. Will repeat in the morning. Check an echocardiogram. Cardiology consult.   · Leukocytosis-wbc is around her baseline. No signs of infection.  · Anemia-at her baseline  · I discussed the patient's findings and my recommendations with patient and ED provider.    VTE Prophylaxis - Eliquis (home med).  Code Status - Limited code (no CPR, no intubation).       Oseas Song MD  [facility]  08/31/22  23:01 EDT

## 2022-09-01 NOTE — ED PROVIDER NOTES
EMERGENCY DEPARTMENT ENCOUNTER    Room Number:  10/10  Date of encounter:  8/31/2022  PCP: Provider, No Known  Historian: Patient, family      HPI:  Chief Complaint: Chest pain  A complete HPI/ROS/PMH/PSH/SH/FH are unobtainable due to: None    Context: Catherine Boyer is a 82 y.o. female who presents to the ED c/o chest pain.  Onset 7:30 PM.  It lasted for 1 hour.  It felt like a dull ache.  It radiated across to her bilateral shoulders.  Worse with exertion.  This began when she was walking.  She states that she is currently asymptomatic.  She was feeling short of breath and clammy when this started.  No headache, vomiting.  No abdominal pain.      PAST MEDICAL HISTORY  Active Ambulatory Problems     Diagnosis Date Noted   • Chronic diastolic (congestive) heart failure (HCC) 07/14/2022   • CAD (coronary artery disease) 07/14/2022   • Nonbacterial thrombotic endocarditis 07/14/2022   • Paroxysmal atrial fibrillation (HCC) 07/14/2022   • Myeloproliferative disorder (HCC) 07/14/2022   • Microcytic anemia 07/14/2022   • Type 2 diabetes mellitus (HCC) 07/14/2022     Resolved Ambulatory Problems     Diagnosis Date Noted   • No Resolved Ambulatory Problems     Past Medical History:   Diagnosis Date   • Atrial fibrillation (HCC)    • Breast cancer (HCC)    • Carotid atherosclerosis    • GERD (gastroesophageal reflux disease)    • Glaucoma    • History of cataract    • Hypertension    • Porcelain gallbladder    • PUD (peptic ulcer disease)    • TIA (transient ischemic attack)    • Upper GI bleed          PAST SURGICAL HISTORY  Past Surgical History:   Procedure Laterality Date   • BREAST LUMPECTOMY  1999   • CATARACT EXTRACTION  2011         FAMILY HISTORY  Family History   Problem Relation Age of Onset   • Ovarian cancer Mother    • Lung cancer Father    • Lung cancer Sister          SOCIAL HISTORY  Social History     Socioeconomic History   • Marital status:    Tobacco Use   • Smoking status: Former Smoker      "Packs/day: 1.00     Years: 20.00     Pack years: 20.00   • Smokeless tobacco: Never Used   • Tobacco comment: 30  years ago   Substance and Sexual Activity   • Alcohol use: Yes     Comment: \"rare\"   • Drug use: Never         ALLERGIES  Aspirin and Oxycodone        REVIEW OF SYSTEMS  Review of Systems     All systems reviewed and negative except for those discussed in HPI.       PHYSICAL EXAM    I have reviewed the triage vital signs and nursing notes.    ED Triage Vitals [08/31/22 2100]   Temp Heart Rate Resp BP SpO2   97.9 °F (36.6 °C) 110 18 147/66 94 %      Temp src Heart Rate Source Patient Position BP Location FiO2 (%)   -- -- -- -- --       Physical Exam  GENERAL: not distressed  HENT: nares patent  EYES: no scleral icterus  CV: regular rhythm, regular rate  RESPIRATORY: normal effort, clear to auscultation bilaterally  ABDOMEN: soft, nontender  MUSCULOSKELETAL: no deformity, trace lower extremity edema   NEURO: alert, moves all extremities, follows commands  SKIN: warm, dry  PSYCH: Anxious affect      LAB RESULTS  Recent Results (from the past 24 hour(s))   ECG 12 Lead    Collection Time: 08/31/22  9:18 PM   Result Value Ref Range    QT Interval 366 ms   Comprehensive Metabolic Panel    Collection Time: 08/31/22  9:31 PM    Specimen: Blood   Result Value Ref Range    Glucose 204 (H) 65 - 99 mg/dL    BUN 34 (H) 8 - 23 mg/dL    Creatinine 1.17 (H) 0.57 - 1.00 mg/dL    Sodium 137 136 - 145 mmol/L    Potassium 4.5 3.5 - 5.2 mmol/L    Chloride 104 98 - 107 mmol/L    CO2 20.6 (L) 22.0 - 29.0 mmol/L    Calcium 9.2 8.6 - 10.5 mg/dL    Total Protein 6.9 6.0 - 8.5 g/dL    Albumin 4.50 3.50 - 5.20 g/dL    ALT (SGPT) 28 1 - 33 U/L    AST (SGOT) 16 1 - 32 U/L    Alkaline Phosphatase 91 39 - 117 U/L    Total Bilirubin 0.4 0.0 - 1.2 mg/dL    Globulin 2.4 gm/dL    A/G Ratio 1.9 g/dL    BUN/Creatinine Ratio 29.1 (H) 7.0 - 25.0    Anion Gap 12.4 5.0 - 15.0 mmol/L    eGFR 46.1 (L) >60.0 mL/min/1.73   Lipase    Collection Time: " 08/31/22  9:31 PM    Specimen: Blood   Result Value Ref Range    Lipase 38 13 - 60 U/L   Troponin    Collection Time: 08/31/22  9:31 PM    Specimen: Blood   Result Value Ref Range    Troponin T 0.010 0.000 - 0.030 ng/mL   BNP    Collection Time: 08/31/22  9:31 PM    Specimen: Blood   Result Value Ref Range    proBNP 2,589.0 (H) 0.0 - 1,800.0 pg/mL   CBC Auto Differential    Collection Time: 08/31/22  9:31 PM    Specimen: Blood   Result Value Ref Range    WBC 24.36 (H) 3.40 - 10.80 10*3/mm3    RBC 2.68 (L) 3.77 - 5.28 10*6/mm3    Hemoglobin 9.3 (L) 12.0 - 15.9 g/dL    Hematocrit 27.6 (L) 34.0 - 46.6 %    .0 (H) 79.0 - 97.0 fL    MCH 34.7 (H) 26.6 - 33.0 pg    MCHC 33.7 31.5 - 35.7 g/dL    RDW 16.0 (H) 12.3 - 15.4 %    RDW-SD 58.3 (H) 37.0 - 54.0 fl    MPV 10.8 6.0 - 12.0 fL    Platelets 249 140 - 450 10*3/mm3    nRBC 0.2 0.0 - 0.2 /100 WBC       Ordered the above labs and independently reviewed the results.        RADIOLOGY  XR Chest 1 View    Result Date: 8/31/2022  CHEST SINGLE VIEW  HISTORY: Chest pain  COMPARISON: None  FINDINGS: Heart size is borderline enlarged. There are increased interstitial markings that are suspected to represent hydrostatic interstitial pulmonary edema. No superimposed airspace disease is evident. There is no pneumothorax. Thoracic aorta is tortuous and aortic vascular calcifications are present. There is advanced osteoarthritis at both shoulders. Several osteochondral bodies are present on the left at the level of the proximal humeral metaphysis.      Increased interstitial markings are predominantly perihilar and suspected to represent hydrostatic interstitial pulmonary edema. There are no previous studies for comparison.  This report was finalized on 8/31/2022 9:54 PM by Dr. Torey Bedoya M.D.        I ordered the above noted radiological studies. Reviewed by me and discussed with radiologist.  See dictation for official radiology  interpretation.      PROCEDURES    Procedures      MEDICATIONS GIVEN IN ER    Medications   sodium chloride 0.9 % flush 10 mL (has no administration in time range)   furosemide (LASIX) injection 80 mg (has no administration in time range)         PROGRESS, DATA ANALYSIS, CONSULTS, AND MEDICAL DECISION MAKING    All labs have been independently reviewed by me.  All radiology studies have been reviewed by me and discussed with radiologist dictating the report.   EKG's independently viewed and interpreted by me.  Discussion below represents my analysis of pertinent findings related to patient's condition, differential diagnosis, treatment plan and final disposition.    Differential diagnosis includes but not limited to acute coronary syndrome, pulmonary embolism, thoracic aortic dissection, pneumonia, pneumothorax, musculoskeletal pain, GERD or esophageal spasm, anxiety, myocarditis/pericarditis, esophageal rupture, pancreatitis.     History: 1  EC  Age: 1  Risk factors: 2    HEART score: 5    ED Course as of 08/31/22 2256   Wed Aug 31, 2022   2124 EKG interpreted by myself.  Time 9:18 PM.  Sinus rhythm.  Heart rate 96.  Normal intervals and axis.  No acute ST abnormality.  Premature atrial complexes noted. [TD]    WBC(!): 24.36 [TD]    Chest x-ray interpreted myself.  Interstitial markings consistent with pulmonary edema. [TD]   215 Patient has a history of myeloproliferative disorder [TD]   2155 On medical chart review, patient last saw Dr. Reinoso on 2022.  She is followed for myelofibrosis on Jakafi..  Initially seen on 2021 with leukocytosis with a white blood cell count of 104,000.  JAK2 positive.  Patient has a known history of coronary artery disease, mild systolic heart failure and history of endocarditis as well as atrial fibrillation.  She is on Eliquis.  Per chart review, coronary angiography revealed severe disease of the LAD and diagonal and she underwent PCI x2.  She has a long lesion  in the mid distal LAD that is being managed medically.  Her last echocardiogram was in February 2022.  EF 45 to 50% with apical hypokinesis.  She recently moved to [place] from [place] to be closer to her family. [TD]   2213 Troponin T: 0.010 [TD]   2213 WBC(!): 24.36  Chronically elevated [TD]   2255 I discussed the case with Dr. Song, hospitalist for [facility].  Reviewed patient's labs, history, imaging.  He will admit to telemetry. [TD]      ED Course User Index  [TD] Jamel Emmanuel II, MD         PPE: The patient wore a surgical mask throughout the entire patient encounter. I wore an N95.    AS OF 22:20 EDT VITALS:    BP - 147/66  HR - 110  TEMP - 97.9 °F (36.6 °C)  O2 SATS - 94%      DIAGNOSIS  Final diagnoses:   Chest pain with high risk for cardiac etiology       DISPOSITION  Admit           Jamel Emmanuel II, MD  08/31/22 1537

## 2022-09-01 NOTE — CONSULTS
Date of Hospital Visit: 2022  Date of consult: 2022  Encounter Provider: Billy Michel MD  Place of Service: [facility] CARDIOLOGY  Patient Name: Catherine Boyer  :1940  Referral Provider: Oseas Song MD    Chief complaint: chest pain    Reason for Consult: CP/CHF    History of Present Illness: Ms Boyer is a 82 year old female with history of hypertension, hyperlipidemia, diabetes, mild carotid disease, prior TIA and right-sided breast cancer.    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 She was diagnosed with new onset atrial fibrillation at that time, with an echocardiogram obtained demonstrating a 4x5mm vegetation/thrombus on the ventricular surface of the mitral valve. Blood cultures were negative and she was diagnosed with nonbacterial endocarditis, treated medically. She remains anticoagulated with Eliquis.     She also has known coronary artery disease, previously followed by a cardiologist ar [facility] in [place] before recently establishing with Dr. Barriga after moving to the [place]. She was admitted there in February with chest pain/NTSEMI and acute anemia requiring blood transfusion. Cardiac catheterization performed during admission revealed severe disease of the LAD and diagonal resulting in PCI, as well as residual disease of her mid-distal LAD that was treated medically. Repeat echocardiogram also obtained noted mild LV systolic dysfunction, EF 45-50%, apical hypokinesis, grade 2 diastolic dysfunction, a torn MV chord, moderate LAE, mild-moderate TR, and moderate PHTN (49mm Hg).     She presented to [facility] ED 22 with reports of acute dull exertional chest pain that radiated to her bilateral shoulders, and associated shortness of breath and diaphoresis.     CXR completed upon arrival to the ED noted pulmonary edema.     Troponin negative. proBNP  2,589. Creatinine 1.17 (previously 0.74 6/30/22). WBC 24.36. Hgb 9.3 (previously 9.5 6/30/22).     She was diuresed with IV Lasix and admitted.       Previous Cardiac Testing:    Echocardiogram 9/30/21    Left ventricular systolic function is normal with ejection fraction    estimated at 55-60 %.    No regional wall motion abnormalities are noted.    There is mild concentric left ventricular hypertrophy.    Normal left ventricular diastolic filling pressure.    Mild anterior and posterior mitral annular calcification is present.    Moderate mitral regurgitation.    The appears to be a small calcified echogenic mass near LVOT, likely    represents degenerative valve disease, and is less likely a vegetation.    Study was compared to 2014 echo and the LVOT mass was not present in 2014.    Mild aortic regurgitation is present.    Moderate tricuspid regurgitation.    Normal systolic pulmonary artery pressure (SPAP) estimated at 32 mmHg (RA    pressure 3 mmHg).    Mild pulmonic regurgitation present.    1/23/2014 55% EF, mild MR.       Past Medical History:   Diagnosis Date   • Atrial fibrillation (HCC)    • Breast cancer (HCC)    • CAD (coronary artery disease)     NSTEMI 2/2022: 90% ostial LAD, 99% D1, 70% mid-distal LAD (medical therapy). She received two stents (2.5x18 and 2.5x26mm [place] LEFTY) but I don't know which one went to which lesion.   • Carotid atherosclerosis    • Chronic diastolic (congestive) heart failure (HCC)    • GERD (gastroesophageal reflux disease)    • Glaucoma    • History of cataract    • Hypertension    • Microcytic anemia     per Dr. oleg pate office  note 6/30/22-dd   • Myeloproliferative disorder (HCC)     JAK2 positive   • Nonbacterial thrombotic endocarditis     6/2021: 4x5mm vegetation on the ventricular surface of the anterior MV, negative blood cultures   • Porcelain gallbladder    • PUD (peptic ulcer disease)    • TIA (transient ischemic attack)    • Type 2 diabetes mellitus (HCC)    •  Upper GI bleed        Past Surgical History:   Procedure Laterality Date   • BREAST LUMPECTOMY  1999   • CATARACT EXTRACTION  2011       Medications Prior to Admission   Medication Sig Dispense Refill Last Dose   • acetaminophen (TYLENOL) 500 MG tablet Take 500 mg by mouth Every 4 (Four) Hours As Needed.   8/31/2022 at Unknown time   • apixaban (ELIQUIS) 5 MG tablet tablet Take 1 tablet by mouth Every 12 (Twelve) Hours for 180 days. 60 tablet 5 8/31/2022 at Unknown time   • atorvastatin (LIPITOR) 80 MG tablet Take 1 tablet by mouth Every Night.   8/31/2022 at Unknown time   • clopidogrel (PLAVIX) 75 MG tablet Take 1 tablet by mouth Daily.   8/31/2022 at Unknown time   • furosemide (LASIX) 40 MG tablet Take 1 tablet by mouth Daily. 90 tablet 3 8/31/2022 at Unknown time   • Jakafi 15 MG chemo tablet Take 1 tablet by mouth 2 (Two) Times a Day. 60 tablet 3 8/31/2022 at Unknown time   • latanoprost (XALATAN) 0.005 % ophthalmic solution    8/31/2022 at Unknown time   • lisinopril (PRINIVIL,ZESTRIL) 10 MG tablet Take 1 tablet by mouth Daily.   8/31/2022 at Unknown time   • metoprolol succinate XL (TOPROL-XL) 25 MG 24 hr tablet 25 mg 2 (Two) Times a Day.   8/31/2022 at Unknown time   • pantoprazole (PROTONIX) 40 MG EC tablet Take 40 mg by mouth Daily.   8/31/2022 at Unknown time   • sertraline (ZOLOFT) 50 MG tablet Take 50 mg by mouth Daily.   8/31/2022 at Unknown time   • bimatoprost (LUMIGAN) 0.01 % ophthalmic drops Apply 1 drop to eye(s) as directed by provider.          Current Meds  Scheduled Meds:apixaban, 5 mg, Oral, Q12H  atorvastatin, 80 mg, Oral, Nightly  clopidogrel, 75 mg, Oral, Daily  [START ON 9/2/2022] furosemide, 40 mg, Intravenous, Q12H  insulin lispro, 0-9 Units, Subcutaneous, TID With Meals  lisinopril, 10 mg, Oral, Daily  metoprolol succinate XL, 25 mg, Oral, BID  pantoprazole, 40 mg, Oral, Daily  sertraline, 50 mg, Oral, Daily      Continuous Infusions:   PRN Meds:.dextrose  •  dextrose  •  glucagon  "(human recombinant)  •  [COMPLETED] Insert peripheral IV **AND** sodium chloride    Allergies as of 08/31/2022 - Reviewed 08/31/2022   Allergen Reaction Noted   • Aspirin Unknown - Low Severity 05/26/2022   • Oxycodone Unknown - Low Severity 09/04/2014       Social History     Socioeconomic History   • Marital status:    Tobacco Use   • Smoking status: Former Smoker     Packs/day: 1.00     Years: 20.00     Pack years: 20.00   • Smokeless tobacco: Never Used   • Tobacco comment: 30  years ago   Substance and Sexual Activity   • Alcohol use: Yes     Comment: \"rare\"   • Drug use: Never       Family History   Problem Relation Age of Onset   • Ovarian cancer Mother    • Lung cancer Father    • Lung cancer Sister        REVIEW OF SYSTEMS:   All systems reviewed and pertinent positives include in HPI otherwise negative review of systems.       Objective:   Temp:  [97.9 °F (36.6 °C)] 97.9 °F (36.6 °C)  Heart Rate:  [] 89  Resp:  [18] 18  BP: (147-149)/(66-80) 148/80  Body mass index is 29.85 kg/m².  Flowsheet Rows    Flowsheet Row First Filed Value   Admission Height 154.9 cm (61\") Documented at 09/01/2022 0249   Admission Weight 71.9 kg (158 lb 8.2 oz) Documented at 09/01/2022 0249        Vitals:    09/01/22 0249   BP: 148/80   Pulse: 89   Resp: 18   Temp: 97.9 °F (36.6 °C)   SpO2: 93%       General Appearance:    Alert, cooperative, in no acute distress   Head:    Normocephalic, without obvious abnormality, atraumatic   Eyes:            Lids and lashes normal, conjunctivae and sclerae normal, no   icterus, no pallor, corneas clear, PERRLA   Ears:    Ears appear intact with no abnormalities noted   Throat:   No oral lesions, no thrush, oral mucosa moist   Neck:   No adenopathy, supple, trachea midline, no thyromegaly, no   carotid bruit, no JVD   Back:     No kyphosis present, no scoliosis present, no skin lesions, erythema or scars, no tenderness to percussion or palpation, range of motion normal   Lungs:     " Clear to auscultation,respirations regular, even and unlabored    Heart:    Regular rhythm and normal rate, normal S1 and S2, no murmur, no gallop, no rub, no click   Chest Wall:    No abnormalities observed   Abdomen:     Normal bowel sounds, no masses, no organomegaly, soft nontender, nondistended, no guarding, no rebound  tenderness   Extremities:   Moves all extremities well, no edema, no cyanosis, no redness   Pulses:   Pulses palpable and equal bilaterally. Normal radial, carotid, femoral, dorsalis pedis and posterior tibial pulses bilaterally. Normal abdominal aorta   Skin:  Neurology:   Psychiatric:   No bleeding, bruising or rash   Normal speech and cranial nerve exam, no focal deficit   Alert and oriented x 3, normal mood and affect                 Review of Data:      Results from last 7 days   Lab Units 09/01/22  0617 08/31/22  2131   SODIUM mmol/L 142 137   POTASSIUM mmol/L 4.2 4.5   CHLORIDE mmol/L 105 104   CO2 mmol/L 24.2 20.6*   BUN mg/dL 33* 34*   CREATININE mg/dL 0.96 1.17*   CALCIUM mg/dL 9.1 9.2   BILIRUBIN mg/dL  --  0.4   ALK PHOS U/L  --  91   ALT (SGPT) U/L  --  28   AST (SGOT) U/L  --  16   GLUCOSE mg/dL 113* 204*     Results from last 7 days   Lab Units 09/01/22  0617 08/31/22  2131   TROPONIN T ng/mL 0.030 0.010     @LABRCNTbnp@  Results from last 7 days   Lab Units 09/01/22  0617 08/31/22  2131   WBC 10*3/mm3 29.57* 24.36*   HEMOGLOBIN g/dL 9.1* 9.3*   HEMATOCRIT % 28.0* 27.6*   PLATELETS 10*3/mm3 238 249         Results from last 7 days   Lab Units 09/01/22 0617   MAGNESIUM mg/dL 1.7     @LABRCNTIP(chol,trig,hdl,ldl)    EKG 8/31/22    Previous EKG 7/14/22        I personally viewed and interpreted the patient's EKG/Telemetry data  )  Patient Active Problem List   Diagnosis   • Acute on chronic diastolic heart failure (HCC)   • CAD (coronary artery disease)   • Nonbacterial thrombotic endocarditis   • Paroxysmal atrial fibrillation (HCC)   • Myeloproliferative disorder (HCC)   •  Microcytic anemia   • Type 2 diabetes mellitus (HCC)   • Chest pain with high risk for cardiac etiology   • GERD (gastroesophageal reflux disease)   • Hypertension   • TIA (transient ischemic attack)         Assessment and Plan:    Ms. 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  Chest pain started last night after she just sat down after getting into her room.  Pain localized to the retrosternal area with radiation to both shoulders that she describes as a severe dull aching that scared her and prompted her to come to the ER.  Pain already slightly improved when EMS arrived.  She did not receive any treatment by EMS.  Negative troponin and unremarkable EKG and ACS ruled out in the ER.  Treatment received in the ER include Lasix 80 mg IV x1.  Home medications include metoprolol succinate 25 mg twice daily, lisinopril 10 mg p.o. daily, torsemide 40 mg p.o. daily clopidogrel 75 mg daily, atorvastatin 80 mg p.o. daily, Eliquis 5 mg p.o. daily.  Pain eventually resolved.  She was chest pain-free during exam.    1.  Chest pain concerning for angina with underlying known coronary artery disease  -ACS ruled out  -EKG with sinus rhythm and no significant abnormalities.  -Vital signs within range  -Get myocardial perfusion study    Continue home medications with monitoring.    Thank you for consulting cardiology    Billy Michel MD  09/01/22  10:16 EDT.  Time spent in reviewing chart, discussion and examination:

## 2022-09-01 NOTE — PROGRESS NOTES
Name: Catherine Boyer ADMIT: 2022   : 1940  PCP: Felix Su MD    MRN: [id number] LOS: 0 days   AGE/SEX: 82 y.o. female  ROOM: [facility]     Subjective   Subjective   Patient just returned from echo upon arrival to the room.  States that she is feeling good this morning, a little short of breath.  Does report increased intake of diet Pepsi and Cheeze-Its.    Review of Systems    Neg for CP, N/V/D  +SOA      Objective   Objective   Vital Signs  Temp:  [97.9 °F (36.6 °C)] 97.9 °F (36.6 °C)  Heart Rate:  [] 89  Resp:  [18] 18  BP: (147-149)/(66-80) 148/80  SpO2:  [93 %-95 %] 93 %  on  Flow (L/min):  [2-3] 2;   Device (Oxygen Therapy): nasal cannula  Body mass index is 29.85 kg/m².  Physical Exam  Constitutional:       Appearance: Normal appearance.   HENT:      Mouth/Throat:      Pharynx: No oropharyngeal exudate or posterior oropharyngeal erythema.   Cardiovascular:      Rate and Rhythm: Normal rate and regular rhythm.      Heart sounds: No murmur heard.    No gallop.   Pulmonary:      Effort: Pulmonary effort is normal. No respiratory distress.      Comments: Mild crackles worse in lower lobes; on 2 L NC  Abdominal:      General: Abdomen is flat. There is no distension.      Palpations: Abdomen is soft.      Tenderness: There is no abdominal tenderness.   Neurological:      General: No focal deficit present.      Mental Status: She is alert and oriented to person, place, and time.         Results Review     I reviewed the patient's new clinical results.  Results from last 7 days   Lab Units 22  0617 22  2131   WBC 10*3/mm3 29.57* 24.36*   HEMOGLOBIN g/dL 9.1* 9.3*   PLATELETS 10*3/mm3 238 249     Results from last 7 days   Lab Units 22  0617 22  2131   SODIUM mmol/L 142 137   POTASSIUM mmol/L 4.2 4.5   CHLORIDE mmol/L 105 104   CO2 mmol/L 24.2 20.6*   BUN mg/dL 33* 34*   CREATININE mg/dL 0.96 1.17*   GLUCOSE mg/dL 113* 204*   EGFR mL/min/1.73 58.5* 46.1*     Results  from last 7 days   Lab Units 08/31/22  2131   ALBUMIN g/dL 4.50   BILIRUBIN mg/dL 0.4   ALK PHOS U/L 91   AST (SGOT) U/L 16   ALT (SGPT) U/L 28     Results from last 7 days   Lab Units 09/01/22  0617 08/31/22  2131   CALCIUM mg/dL 9.1 9.2   ALBUMIN g/dL  --  4.50   MAGNESIUM mg/dL 1.7  --        Hemoglobin A1C   Date/Time Value Ref Range Status   09/01/2022 0616 6.60 (H) 4.80 - 5.60 % Final     Glucose   Date/Time Value Ref Range Status   09/01/2022 1121 140 (H) 70 - 130 mg/dL Final     Comment:     Meter: [id number] : [id number] Mayco Ronal SHEREE       XR Chest 1 View    Result Date: 8/31/2022  Increased interstitial markings are predominantly perihilar and suspected to represent hydrostatic interstitial pulmonary edema. There are no previous studies for comparison.  This report was finalized on 8/31/2022 9:54 PM by Dr. Torey Bedoya M.D.      Scheduled Medications  apixaban, 5 mg, Oral, Q12H  atorvastatin, 80 mg, Oral, Nightly  clopidogrel, 75 mg, Oral, Daily  [START ON 9/2/2022] furosemide, 40 mg, Intravenous, Q12H  insulin lispro, 0-9 Units, Subcutaneous, TID With Meals  lisinopril, 10 mg, Oral, Daily  metoprolol succinate XL, 25 mg, Oral, BID  pantoprazole, 40 mg, Oral, Daily  sertraline, 50 mg, Oral, Daily    Infusions   Diet  NPO Diet NPO Type: Strict NPO       Assessment/Plan     Active Hospital Problems    Diagnosis  POA   • **Acute on chronic diastolic heart failure (HCC) [I50.33]  Yes   • Chest pain with high risk for cardiac etiology [R07.9]  Yes   • GERD (gastroesophageal reflux disease) [K21.9]  Yes   • Hypertension [I10]  Yes   • TIA (transient ischemic attack) [G45.9]  Yes   • CAD (coronary artery disease) [I25.10]  Yes   • 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       82 y.o. female admitted with Acute on chronic diastolic heart failure (HCC).      Acute on chronic diastolic heart failure  Acute hypoxic respiratory failure  -proBNP 2500 OA  -TTE (9/1/22): EF 57%   -Consult cardiology  -IV Lasix 40 every 12    CAD  Chest pain  -s/p 2 LEFTY 2/2022  -Continue Plavix, atorvastatin 80mg  -Troponin negative, EKG without acute ischemic changes  -Cardiology planning nuclear stress    Paroxysmal Afib  -RC: Metoprolol 25 mg  -AC: Apixaban    Myeloproliferative disorder  Anemia, macrocytic  Leukocytosis  -Near baseline, stable    DM2  -Last A1c 6.6 (9/1/22)  -SSI  -Monitor blood glucose closely    HTN  -home lisinopril 10mg, metoprolol 25mg      · Eliquis (home med) for DVT prophylaxis.  · DNR.  · Discussed with patient and care team on multidisciplinary rounds.  · Anticipate discharge home with HH vs SNU facility when cleared by consultants.      Anthony Muñoz MD  [facility]  09/01/22  11:31 EDT

## 2022-09-01 NOTE — THERAPY EVALUATION
Patient Name: Catherine Boyer  : 1940    MRN: [id number]                              Today's Date: 2022       Admit Date: 2022    Visit Dx:     ICD-10-CM ICD-9-CM   1. Chest pain with high risk for cardiac etiology  R07.9 786.50   2. Acute on chronic congestive heart failure, unspecified heart failure type (HCC)  I50.9 428.0     Patient Active Problem List   Diagnosis   • Acute on chronic diastolic heart failure (HCC)   • CAD (coronary artery disease)   • Nonbacterial thrombotic endocarditis   • Paroxysmal atrial fibrillation (HCC)   • Myeloproliferative disorder (HCC)   • Microcytic anemia   • Type 2 diabetes mellitus (HCC)   • Chest pain with high risk for cardiac etiology   • GERD (gastroesophageal reflux disease)   • Hypertension   • TIA (transient ischemic attack)     Past Medical History:   Diagnosis Date   • Atrial fibrillation (HCC)    • Breast cancer (HCC)    • CAD (coronary artery disease)     NSTEMI 2022: 90% ostial LAD, 99% D1, 70% mid-distal LAD (medical therapy). She received two stents (2.5x18 and 2.5x26mm Finesse LEFTY) but I don't know which one went to which lesion.   • Carotid atherosclerosis    • Chronic diastolic (congestive) heart failure (HCC)    • GERD (gastroesophageal reflux disease)    • Glaucoma    • History of cataract    • Hypertension    • Microcytic anemia     per Dr. oleg pate office  note 22-dd   • Myeloproliferative disorder (HCC)     JAK2 positive   • Nonbacterial thrombotic endocarditis     2021: 4x5mm vegetation on the ventricular surface of the anterior MV, negative blood cultures   • Porcelain gallbladder    • PUD (peptic ulcer disease)    • TIA (transient ischemic attack)    • Type 2 diabetes mellitus (HCC)    • Upper GI bleed      Past Surgical History:   Procedure Laterality Date   • BREAST LUMPECTOMY     • CATARACT EXTRACTION        General Information     Row Name 22 1508          Physical Therapy Time and Intention    Document Type  evaluation (P)   -TT     Mode of Treatment physical therapy (P)   -TT     Row Name 09/01/22 1508          General Information    Patient Profile Reviewed yes (P)   -TT     Prior Level of Function independent: (P)   Pt IND with SPC at assisted living facility.  -TT     Existing Precautions/Restrictions no known precautions/restrictions (P)   -TT     Barriers to Rehab none identified (P)   -TT     Row Name 09/01/22 1508          Living Environment    People in Home alone (P)   pt at assisted living facility  -TT     Row Name 09/01/22 1508          Home Main Entrance    Number of Stairs, Main Entrance none (P)   -TT     Stair Railings, Main Entrance none (P)   -TT     Row Name 09/01/22 1508          Cognition    Orientation Status (Cognition) oriented x 3 (P)   -TT     Row Name 09/01/22 1508          Safety Issues, Functional Mobility    Impairments Affecting Function (Mobility) balance;endurance/activity tolerance;range of motion (ROM);strength (P)   -TT           User Key  (r) = Recorded By, (t) = Taken By, (c) = Cosigned By    Initials Name Provider Type    TT Lilian Hart, PT Student PT Student               Mobility     Row Name 09/01/22 1511          Bed Mobility    Bed Mobility supine-sit (P)   -TT     Supine-Sit Armstrong (Bed Mobility) minimum assist (75% patient effort);contact guard (P)   -TT     Assistive Device (Bed Mobility) head of bed elevated;bed rails (P)   -TT     Row Name 09/01/22 1511          Sit-Stand Transfer    Sit-Stand Armstrong (Transfers) contact guard (P)   -TT     Assistive Device (Sit-Stand Transfers) -- (P)   HHA x 1  -TT     Row Name 09/01/22 1511          Gait/Stairs (Locomotion)    Armstrong Level (Gait) contact guard (P)   -TT     Assistive Device (Gait) -- (P)   HHA x 1  -TT     Distance in Feet (Gait) 80 (P)   -TT     Deviations/Abnormal Patterns (Gait) gait speed decreased (P)   -TT     Comment, (Gait/Stairs) Pt had no LOB or unsteadiness during ambulation. (P)   -TT            User Key  (r) = Recorded By, (t) = Taken By, (c) = Cosigned By    Initials Name Provider Type    TT Lilian Hart, PT Student PT Student               Obj/Interventions     Row Name 09/01/22 1513          Range of Motion Comprehensive    General Range of Motion no range of motion deficits identified (P)   -TT     Row Name 09/01/22 1513          Strength Comprehensive (MMT)    General Manual Muscle Testing (MMT) Assessment lower extremity strength deficits identified (P)   -TT     Comment, General Manual Muscle Testing (MMT) Assessment generalized weakness (P)   -TT     Row Name 09/01/22 1513          Balance    Balance Assessment sitting static balance;standing static balance;standing dynamic balance (P)   -TT     Static Sitting Balance independent (P)   -TT     Static Standing Balance contact guard (P)   -TT     Dynamic Standing Balance contact guard (P)   -TT     Position/Device Used, Standing Balance -- (P)   -TT           User Key  (r) = Recorded By, (t) = Taken By, (c) = Cosigned By    Initials Name Provider Type    TT Lilian Hart, PT Student PT Student               Goals/Plan    No documentation.                Clinical Impression     Row Name 09/01/22 1514          Pain    Pretreatment Pain Rating 0/10 - no pain (P)   -TT     Row Name 09/01/22 1514          Plan of Care Review    Plan of Care Reviewed With patient (P)   -TT     Progress improving (P)   -TT     Outcome Evaluation Pt admitted to hospital for chest pain with CHF, HTN, and CAD. Pt recieved supine in bed and is agreeable to PT treatment at this time. Pt lives at an assisted living facility and as IND in all mobility with a SPC and ADLs. Pt reports having no recent falls. Pt was CGA for all bed mobility and transfers. Pt ambulated 80ft with CGA/HHA with no LOB or unsteadiness. Pt plans to DC back to assisted living facility and PT is in agreement with DC plans at this time. Pt appears to be at her baseline at this time and pt agrees  with this assessment. Skilled PT is not needed at this time. Please re-order PT if pt status changes. Pt left in chair with all needs in reach. (P)   -TT     Row Name 09/01/22 1514          Therapy Assessment/Plan (PT)    Patient/Family Therapy Goals Statement (PT) to go back to assisted living facility (P)   -TT     Criteria for Skilled Interventions Met (PT) no problems identified which require skilled intervention (P)   -TT     Therapy Frequency (PT) evaluation only (P)   -TT     Row Name 09/01/22 1514          Positioning and Restraints    Pre-Treatment Position in bed (P)   -TT     Post Treatment Position chair (P)   -TT     In Chair notified nsg;reclined;call light within reach;encouraged to call for assist;exit alarm on (P)   nsg notified of no box for chair alarm,but strip is under pt in chair  -TT           User Key  (r) = Recorded By, (t) = Taken By, (c) = Cosigned By    Initials Name Provider Type    TT Lilian Hart PT Student PT Student               Outcome Measures     Row Name 09/01/22 1520          How much help from another person do you currently need...    Turning from your back to your side while in flat bed without using bedrails? 4 (P)   -TT     Moving from lying on back to sitting on the side of a flat bed without bedrails? 4 (P)   -TT     Moving to and from a bed to a chair (including a wheelchair)? 4 (P)   -TT     Standing up from a chair using your arms (e.g., wheelchair, bedside chair)? 4 (P)   -TT     Climbing 3-5 steps with a railing? 3 (P)   -TT     To walk in hospital room? 3 (P)   -TT     AM-PAC 6 Clicks Score (PT) 22 (P)   -TT     Highest level of mobility 7 --> Walked 25 feet or more (P)   -TT     Row Name 09/01/22 1520          Functional Assessment    Outcome Measure Options AM-PAC 6 Clicks Basic Mobility (PT) (P)   -TT           User Key  (r) = Recorded By, (t) = Taken By, (c) = Cosigned By    Initials Name Provider Type    TT Lilian Hart, PT Student PT Student                 PT Recommendation and Plan     Plan of Care Reviewed With: (P) patient  Progress: (P) improving  Outcome Evaluation: (P) Pt admitted to hospital for chest pain with CHF, HTN, and CAD. Pt recieved supine in bed and is agreeable to PT treatment at this time. Pt lives at an assisted living facility and as IND in all mobility with a SPC and ADLs. Pt reports having no recent falls. Pt was CGA for all bed mobility and transfers. Pt ambulated 80ft with CGA/HHA with no LOB or unsteadiness. Pt plans to DC back to assisted living facility and PT is in agreement with DC plans at this time. Pt appears to be at her baseline at this time and pt agrees with this assessment. Skilled PT is not needed at this time. Please re-order PT if pt status changes. Pt left in chair with all needs in reach.     Time Calculation:    PT Charges     Row Name 09/01/22 1521             Time Calculation    Start Time 1415 (P)   -TT      Stop Time 1438 (P)   -TT      Time Calculation (min) 23 min (P)   -TT      PT Received On 09/01/22 (P)   -TT              Time Calculation- PT    Total Timed Code Minutes- PT 15 minute(s) (P)   -TT            User Key  (r) = Recorded By, (t) = Taken By, (c) = Cosigned By    Initials Name Provider Type    Lilian Uribe PT Student PT Student              Therapy Charges for Today     Code Description Service Date Service Provider Modifiers Qty    17768462486 HC PT EVAL MOD COMPLEXITY 2 9/1/2022 Lilian Hart, PT Student GP 1    37122368411 HC PT THER PROC EA 15 MIN 9/1/2022 Lilian Hart PT Student GP 1          PT G-Codes  Outcome Measure Options: (P) AM-PAC 6 Clicks Basic Mobility (PT)  AM-PAC 6 Clicks Score (PT): (P) 22    PT Discharge Summary  Anticipated Discharge Disposition (PT): (P) assisted living    OZIEL Wallace  9/1/2022

## 2022-09-02 ENCOUNTER — APPOINTMENT (OUTPATIENT)
Dept: NUCLEAR MEDICINE | Facility: HOSPITAL | Age: 82
End: 2022-09-02

## 2022-09-02 LAB
ANION GAP SERPL CALCULATED.3IONS-SCNC: 12.4 MMOL/L (ref 5–15)
ANISOCYTOSIS BLD QL: ABNORMAL
BH CV REST NUCLEAR ISOTOPE DOSE: 11.4 MCI
BH CV STRESS COMMENTS STAGE 1: NORMAL
BH CV STRESS DOSE REGADENOSON STAGE 1: 0.4
BH CV STRESS DURATION MIN STAGE 1: 0
BH CV STRESS DURATION SEC STAGE 1: 10
BH CV STRESS NUCLEAR ISOTOPE DOSE: 26 MCI
BH CV STRESS PROTOCOL 1: NORMAL
BH CV STRESS RECOVERY BP: NORMAL MMHG
BH CV STRESS RECOVERY HR: 90 BPM
BH CV STRESS STAGE 1: 1
BUN SERPL-MCNC: 36 MG/DL (ref 8–23)
BUN/CREAT SERPL: 34 (ref 7–25)
CALCIUM SPEC-SCNC: 8.9 MG/DL (ref 8.6–10.5)
CHLORIDE SERPL-SCNC: 105 MMOL/L (ref 98–107)
CO2 SERPL-SCNC: 23.6 MMOL/L (ref 22–29)
CREAT SERPL-MCNC: 1.06 MG/DL (ref 0.57–1)
CRP SERPL-MCNC: 0.42 MG/DL (ref 0–0.5)
DEPRECATED RDW RBC AUTO: 58.2 FL (ref 37–54)
EGFRCR SERPLBLD CKD-EPI 2021: 51.9 ML/MIN/1.73
EOSINOPHIL # BLD MANUAL: 0.29 10*3/MM3 (ref 0–0.4)
EOSINOPHIL NFR BLD MANUAL: 1 % (ref 0.3–6.2)
ERYTHROCYTE [DISTWIDTH] IN BLOOD BY AUTOMATED COUNT: 15.9 % (ref 12.3–15.4)
ERYTHROCYTE [SEDIMENTATION RATE] IN BLOOD: <1 MM/HR (ref 0–30)
GLUCOSE BLDC GLUCOMTR-MCNC: 114 MG/DL (ref 70–130)
GLUCOSE BLDC GLUCOMTR-MCNC: 115 MG/DL (ref 70–130)
GLUCOSE BLDC GLUCOMTR-MCNC: 186 MG/DL (ref 70–130)
GLUCOSE SERPL-MCNC: 118 MG/DL (ref 65–99)
HCT VFR BLD AUTO: 25.6 % (ref 34–46.6)
HGB BLD-MCNC: 8.5 G/DL (ref 12–15.9)
LV EF NUC BP: 27 %
LYMPHOCYTES # BLD MANUAL: 0.9 10*3/MM3 (ref 0.7–3.1)
LYMPHOCYTES NFR BLD MANUAL: 7.1 % (ref 5–12)
MACROCYTES BLD QL SMEAR: ABNORMAL
MAGNESIUM SERPL-MCNC: 1.8 MG/DL (ref 1.6–2.4)
MAXIMAL PREDICTED HEART RATE: 136 BPM
MCH RBC QN AUTO: 33.3 PG (ref 26.6–33)
MCHC RBC AUTO-ENTMCNC: 33.2 G/DL (ref 31.5–35.7)
MCV RBC AUTO: 100.4 FL (ref 79–97)
MONOCYTES # BLD: 2.06 10*3/MM3 (ref 0.1–0.9)
NEUTROPHILS # BLD AUTO: 25.78 10*3/MM3 (ref 1.7–7)
NEUTROPHILS NFR BLD MANUAL: 88.8 % (ref 42.7–76)
PERCENT MAX PREDICTED HR: 67.65 %
PHOSPHATE SERPL-MCNC: 4 MG/DL (ref 2.5–4.5)
PLAT MORPH BLD: NORMAL
PLATELET # BLD AUTO: 224 10*3/MM3 (ref 140–450)
PMV BLD AUTO: 11.3 FL (ref 6–12)
POTASSIUM SERPL-SCNC: 4.8 MMOL/L (ref 3.5–5.2)
PROCALCITONIN SERPL-MCNC: 0.15 NG/ML (ref 0–0.25)
RBC # BLD AUTO: 2.55 10*6/MM3 (ref 3.77–5.28)
SODIUM SERPL-SCNC: 141 MMOL/L (ref 136–145)
STRESS BASELINE BP: NORMAL MMHG
STRESS BASELINE HR: 73 BPM
STRESS PERCENT HR: 80 %
STRESS POST PEAK BP: NORMAL MMHG
STRESS POST PEAK HR: 92 BPM
STRESS TARGET HR: 116 BPM
VARIANT LYMPHS NFR BLD MANUAL: 3.1 % (ref 19.6–45.3)
WBC MORPH BLD: NORMAL
WBC NRBC COR # BLD: 29.03 10*3/MM3 (ref 3.4–10.8)

## 2022-09-02 PROCEDURE — 25010000002 HEPARIN (PORCINE) PER 1000 UNITS: Performed by: INTERNAL MEDICINE

## 2022-09-02 PROCEDURE — C1887 CATHETER, GUIDING: HCPCS | Performed by: INTERNAL MEDICINE

## 2022-09-02 PROCEDURE — 92928 PRQ TCAT PLMT NTRAC ST 1 LES: CPT | Performed by: INTERNAL MEDICINE

## 2022-09-02 PROCEDURE — C1725 CATH, TRANSLUMIN NON-LASER: HCPCS | Performed by: INTERNAL MEDICINE

## 2022-09-02 PROCEDURE — 4A023N7 MEASUREMENT OF CARDIAC SAMPLING AND PRESSURE, LEFT HEART, PERCUTANEOUS APPROACH: ICD-10-PCS | Performed by: INTERNAL MEDICINE

## 2022-09-02 PROCEDURE — 0 IOPAMIDOL PER 1 ML: Performed by: INTERNAL MEDICINE

## 2022-09-02 PROCEDURE — 93016 CV STRESS TEST SUPVJ ONLY: CPT | Performed by: INTERNAL MEDICINE

## 2022-09-02 PROCEDURE — 93454 CORONARY ARTERY ANGIO S&I: CPT | Performed by: INTERNAL MEDICINE

## 2022-09-02 PROCEDURE — C1769 GUIDE WIRE: HCPCS | Performed by: INTERNAL MEDICINE

## 2022-09-02 PROCEDURE — 25010000002 FUROSEMIDE PER 20 MG: Performed by: INTERNAL MEDICINE

## 2022-09-02 PROCEDURE — 85347 COAGULATION TIME ACTIVATED: CPT

## 2022-09-02 PROCEDURE — 84100 ASSAY OF PHOSPHORUS: CPT | Performed by: STUDENT IN AN ORGANIZED HEALTH CARE EDUCATION/TRAINING PROGRAM

## 2022-09-02 PROCEDURE — 99232 SBSQ HOSP IP/OBS MODERATE 35: CPT | Performed by: INTERNAL MEDICINE

## 2022-09-02 PROCEDURE — 99152 MOD SED SAME PHYS/QHP 5/>YRS: CPT | Performed by: INTERNAL MEDICINE

## 2022-09-02 PROCEDURE — 93018 CV STRESS TEST I&R ONLY: CPT | Performed by: INTERNAL MEDICINE

## 2022-09-02 PROCEDURE — 78452 HT MUSCLE IMAGE SPECT MULT: CPT

## 2022-09-02 PROCEDURE — 25010000002 FENTANYL CITRATE (PF) 50 MCG/ML SOLUTION: Performed by: INTERNAL MEDICINE

## 2022-09-02 PROCEDURE — 99153 MOD SED SAME PHYS/QHP EA: CPT | Performed by: INTERNAL MEDICINE

## 2022-09-02 PROCEDURE — 85007 BL SMEAR W/DIFF WBC COUNT: CPT | Performed by: STUDENT IN AN ORGANIZED HEALTH CARE EDUCATION/TRAINING PROGRAM

## 2022-09-02 PROCEDURE — 83735 ASSAY OF MAGNESIUM: CPT | Performed by: STUDENT IN AN ORGANIZED HEALTH CARE EDUCATION/TRAINING PROGRAM

## 2022-09-02 PROCEDURE — 63710000001 RUXOLITINIB 15 MG TABLET: Performed by: INTERNAL MEDICINE

## 2022-09-02 PROCEDURE — C9399 UNCLASSIFIED DRUGS OR BIOLOG: HCPCS | Performed by: INTERNAL MEDICINE

## 2022-09-02 PROCEDURE — C1874 STENT, COATED/COV W/DEL SYS: HCPCS | Performed by: INTERNAL MEDICINE

## 2022-09-02 PROCEDURE — A9500 TC99M SESTAMIBI: HCPCS | Performed by: STUDENT IN AN ORGANIZED HEALTH CARE EDUCATION/TRAINING PROGRAM

## 2022-09-02 PROCEDURE — 80048 BASIC METABOLIC PNL TOTAL CA: CPT | Performed by: STUDENT IN AN ORGANIZED HEALTH CARE EDUCATION/TRAINING PROGRAM

## 2022-09-02 PROCEDURE — 82962 GLUCOSE BLOOD TEST: CPT

## 2022-09-02 PROCEDURE — 85025 COMPLETE CBC W/AUTO DIFF WBC: CPT | Performed by: STUDENT IN AN ORGANIZED HEALTH CARE EDUCATION/TRAINING PROGRAM

## 2022-09-02 PROCEDURE — 027034Z DILATION OF CORONARY ARTERY, ONE ARTERY WITH DRUG-ELUTING INTRALUMINAL DEVICE, PERCUTANEOUS APPROACH: ICD-10-PCS | Performed by: INTERNAL MEDICINE

## 2022-09-02 PROCEDURE — 25010000002 REGADENOSON 0.4 MG/5ML SOLUTION: Performed by: STUDENT IN AN ORGANIZED HEALTH CARE EDUCATION/TRAINING PROGRAM

## 2022-09-02 PROCEDURE — 0 TECHNETIUM SESTAMIBI: Performed by: STUDENT IN AN ORGANIZED HEALTH CARE EDUCATION/TRAINING PROGRAM

## 2022-09-02 PROCEDURE — C9600 PERC DRUG-EL COR STENT SING: HCPCS | Performed by: INTERNAL MEDICINE

## 2022-09-02 PROCEDURE — 93017 CV STRESS TEST TRACING ONLY: CPT

## 2022-09-02 PROCEDURE — 85652 RBC SED RATE AUTOMATED: CPT | Performed by: INTERNAL MEDICINE

## 2022-09-02 PROCEDURE — B2111ZZ FLUOROSCOPY OF MULTIPLE CORONARY ARTERIES USING LOW OSMOLAR CONTRAST: ICD-10-PCS | Performed by: INTERNAL MEDICINE

## 2022-09-02 PROCEDURE — C1894 INTRO/SHEATH, NON-LASER: HCPCS | Performed by: INTERNAL MEDICINE

## 2022-09-02 PROCEDURE — 86140 C-REACTIVE PROTEIN: CPT | Performed by: INTERNAL MEDICINE

## 2022-09-02 PROCEDURE — 25010000002 MIDAZOLAM PER 1 MG: Performed by: INTERNAL MEDICINE

## 2022-09-02 PROCEDURE — 78452 HT MUSCLE IMAGE SPECT MULT: CPT | Performed by: INTERNAL MEDICINE

## 2022-09-02 PROCEDURE — 84145 PROCALCITONIN (PCT): CPT | Performed by: INTERNAL MEDICINE

## 2022-09-02 DEVICE — XIENCE SKYPOINT™ EVEROLIMUS ELUTING CORONARY STENT SYSTEM 2.25 MM X 15 MM / RAPID-EXCHANGE
Type: IMPLANTABLE DEVICE | Site: HEART | Status: FUNCTIONAL
Brand: XIENCE SKYPOINT™

## 2022-09-02 RX ORDER — SODIUM CHLORIDE 9 MG/ML
INJECTION, SOLUTION INTRAVENOUS CONTINUOUS PRN
Status: COMPLETED | OUTPATIENT
Start: 2022-09-02 | End: 2022-09-02

## 2022-09-02 RX ORDER — LIDOCAINE HYDROCHLORIDE 20 MG/ML
INJECTION, SOLUTION INFILTRATION; PERINEURAL AS NEEDED
Status: DISCONTINUED | OUTPATIENT
Start: 2022-09-02 | End: 2022-09-02 | Stop reason: HOSPADM

## 2022-09-02 RX ORDER — FENTANYL CITRATE 50 UG/ML
INJECTION, SOLUTION INTRAMUSCULAR; INTRAVENOUS AS NEEDED
Status: DISCONTINUED | OUTPATIENT
Start: 2022-09-02 | End: 2022-09-02 | Stop reason: HOSPADM

## 2022-09-02 RX ORDER — CLOPIDOGREL BISULFATE 75 MG/1
TABLET ORAL AS NEEDED
Status: DISCONTINUED | OUTPATIENT
Start: 2022-09-02 | End: 2022-09-02 | Stop reason: HOSPADM

## 2022-09-02 RX ORDER — HEPARIN SODIUM 1000 [USP'U]/ML
INJECTION, SOLUTION INTRAVENOUS; SUBCUTANEOUS AS NEEDED
Status: DISCONTINUED | OUTPATIENT
Start: 2022-09-02 | End: 2022-09-02 | Stop reason: HOSPADM

## 2022-09-02 RX ORDER — MIDAZOLAM HYDROCHLORIDE 1 MG/ML
INJECTION INTRAMUSCULAR; INTRAVENOUS AS NEEDED
Status: DISCONTINUED | OUTPATIENT
Start: 2022-09-02 | End: 2022-09-02 | Stop reason: HOSPADM

## 2022-09-02 RX ADMIN — TECHNETIUM TC 99M SESTAMIBI 1 DOSE: 1 INJECTION INTRAVENOUS at 12:00

## 2022-09-02 RX ADMIN — RUXOLITINIB 15 MG: 15 TABLET ORAL at 20:10

## 2022-09-02 RX ADMIN — ATORVASTATIN CALCIUM 80 MG: 80 TABLET, FILM COATED ORAL at 20:09

## 2022-09-02 RX ADMIN — REGADENOSON 0.4 MG: 0.08 INJECTION, SOLUTION INTRAVENOUS at 12:00

## 2022-09-02 RX ADMIN — METOPROLOL SUCCINATE 25 MG: 25 TABLET, EXTENDED RELEASE ORAL at 20:09

## 2022-09-02 RX ADMIN — FUROSEMIDE 40 MG: 10 INJECTION, SOLUTION INTRAMUSCULAR; INTRAVENOUS at 20:08

## 2022-09-02 RX ADMIN — TECHNETIUM TC 99M SESTAMIBI 1 DOSE: 1 INJECTION INTRAVENOUS at 07:00

## 2022-09-02 NOTE — PLAN OF CARE
Goal Outcome Evaluation:      Received from [facility] rt radial site with  TR band no hematoma vitals stable willcont to monitor

## 2022-09-02 NOTE — PROGRESS NOTES
"CC: Chest pain    Interval History: No acute events overnight      Vital Signs  Temp:  [97.7 °F (36.5 °C)-99.2 °F (37.3 °C)] 97.7 °F (36.5 °C)  Heart Rate:  [74-98] 74  Resp:  [16-18] 16  BP: ()/(43-56) 97/56  No intake or output data in the 24 hours ending 09/02/22 1111  Flowsheet Rows    Flowsheet Row First Filed Value   Admission Height 154.9 cm (61\") Documented at 09/01/2022 0249   Admission Weight 71.9 kg (158 lb 8.2 oz) Documented at 09/01/2022 0249          PHYSICAL EXAM:  General: No acute distress  Resp:NL Rate, symmetric chest expansion,unlabored, clear  CV:NL rate and rhythm, NL PMI, NL S1 and S2, no Murmur, no gallop, no rub, No JVD.   ABD:Nl sounds, no masses or tenderness, nondistended, no guarding or rebound  Neuro: alert,cooperative and oriented  Extr:Normal pedal pulses, No edema or cyanosis, moves all extremities      Results Review:    Results from last 7 days   Lab Units 09/02/22  0825   SODIUM mmol/L 141   POTASSIUM mmol/L 4.8   CHLORIDE mmol/L 105   CO2 mmol/L 23.6   BUN mg/dL 36*   CREATININE mg/dL 1.06*   GLUCOSE mg/dL 118*   CALCIUM mg/dL 8.9     Results from last 7 days   Lab Units 09/01/22  0617 08/31/22  2131   TROPONIN T ng/mL 0.030 0.010     Results from last 7 days   Lab Units 09/02/22  0825   WBC 10*3/mm3 29.03*   HEMOGLOBIN g/dL 8.5*   HEMATOCRIT % 25.6*   PLATELETS 10*3/mm3 224         Results from last 7 days   Lab Units 09/01/22  0617   CHOLESTEROL mg/dL 112     Results from last 7 days   Lab Units 09/02/22  0825   MAGNESIUM mg/dL 1.8     Results from last 7 days   Lab Units 09/01/22  0617   CHOLESTEROL mg/dL 112   TRIGLYCERIDES mg/dL 157*   HDL CHOL mg/dL 37*   LDL CHOL mg/dL 48     I reviewed the patient's new clinical results.  I personally viewed and interpreted the patient's EKG/Telemetry data        Medication Review:   Meds reviewed         Assessment/Plan     Ms. 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  Chest pain started last night after she just sat down after getting into her room.  Pain localized to the retrosternal area with radiation to both shoulders that she describes as a severe dull aching that scared her and prompted her to come to the ER.  Pain already slightly improved when EMS arrived.  She did not receive any treatment by EMS.  Negative troponin and unremarkable EKG and ACS ruled out in the ER.  Treatment received in the ER include Lasix 80 mg IV x1.  Home medications include metoprolol succinate 25 mg twice daily, lisinopril 10 mg p.o. daily, torsemide 40 mg p.o. daily clopidogrel 75 mg daily, atorvastatin 80 mg p.o. daily, Eliquis 5 mg p.o. daily.  Pain eventually resolved.  She was chest pain-free during exam.     1.  Chest pain concerning for angina with underlying known coronary artery disease  -ACS ruled out  -EKG with sinus rhythm and no significant abnormalities.  -Vital signs within range  -Echo with preserved left ventricular ejection fraction but apical hypokinesis.  -Get myocardial perfusion study    2.  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  Old vegetation that is mobile on ventricular surface of noncoronary cusp of the aortic valve.  Prior echo in September 2021 reported similar lesion.  No clinical evidence for infective endocarditis.  Continue to monitor.    3.  Hypotension: Blood pressure running on the lower side overnight.  Hold diuretic and hypertensive for this morning pending stress testing    4.  Macrocytic anemia/myeloproliferative disorder-follows up with hematology    She  had abnormal myocardial perfusion study today and subsequently had coronary angiogram  With intervention on diagonal branch.     No recurrence of chest pain  DC tomorrow.    Billy Michel MD  09/02/22  11:11 EDT

## 2022-09-02 NOTE — PLAN OF CARE
Goal Outcome Evaluation:      Diuretic in Use: lasix  Response to Diuretics (Output greater than intake): fair  Daily Weight (up or down):   O2 Requirements: 2L  Functional Status (Activity level, tolerance and respiratory symptoms): up x 1 asst  Discharge Plans: dc to asst living

## 2022-09-02 NOTE — CASE MANAGEMENT/SOCIAL WORK
Continued Stay Note  [facility]     Patient Name: Catherine Boyer  MRN: [id number]  Today's Date: 9/2/2022    Admit Date: 8/31/2022     Discharge Plan     Row Name 09/02/22 0912       Plan    Plan Comments Message left for Katina/Max @ [facility] [phone] to see about patient's basline level of functioning and what the criteria is for her to return.  Awaiting call back. Sulema Mayer RN               Discharge Codes    No documentation.               Expected Discharge Date and Time     Expected Discharge Date Expected Discharge Time    Sep 5, 2022             Sulema Mayer RN

## 2022-09-02 NOTE — CASE MANAGEMENT/SOCIAL WORK
Continued Stay Note  [facility]     Patient Name: Catherine Boyer  MRN: [id number]  Today's Date: 9/2/2022    Admit Date: 8/31/2022     Discharge Plan     Row Name 09/02/22 1411       Plan    Plan Return to [facility]    Patient/Family in Agreement with Plan yes    Plan Comments Spoke with Admissions at [facility] who stated constance can return when medically ready for DC as long as she is ambulatory and does not have any IVs. CCP inquired if facility would need report or DC summary and they were not sure. Left VM for Katina/Admission to determine if facility would need report called or DC summary sent. CCP to follow. Gary MEZA RN               Discharge Codes    No documentation.               Expected Discharge Date and Time     Expected Discharge Date Expected Discharge Time    Sep 6, 2022             Gary Terrazas RN

## 2022-09-02 NOTE — DISCHARGE INSTRUCTIONS
[facility]  [address] [place] [address]    Coronary Angiogram (Radial/Ulnar Approach) After Care    Refer to this sheet in the next few weeks. These instructions provide you with information on caring for yourself after your procedure. Your caregiver may also give you more specific instructions. Your treatment has been planned according to current medical practices, but problems sometimes occur. Call your caregiver if you have any problems or questions after your procedure.    Home Care Instructions:  You may shower the day after the procedure. Remove the bandage (dressing) and gently wash the site with plain soap and water. Gently pat the site dry. You may apply a band aid daily for 2 days if desired.    Do not apply powder or lotion to the site.  Do not submerge the affected site in water for 3 to 5 days or until the site is completely healed.   Do not lift, push or pull anything over 5 pounds for 5 days after your procedure or as directed by your physician.  As a reference, a gallon of milk weighs 8 pounds.   Inspect the site at least twice daily. You may notice some bruising at the site and it may be tender for 1 to 2 weeks.     Increase your fluid intake for the next 2 days.    Keep arm elevated for 24 hours. For the remainder of the day, keep your arm in “Pledge of Allegiance” position when up and about.     You may drive 24 hours after the procedure unless otherwise instructed by your caregiver.  Do not operate machinery or power tools for 24 hours.  A responsible adult should be with you for the first 24 hours after you arrive home. Do not make any important legal decisions or sign legal papers for 24 hours.  Do not drink alcohol for 24 hours.    Metformin or any medications containing Metformin should not be taken for 48 hours after your procedure.      Call Your Doctor if:   You have unusual pain at the radial/ulnar (wrist) site.  You have redness, warmth, swelling, or pain at the  radial/ulnar (wrist) site.  You have drainage (other than a small amount of blood on the dressing).  `You have chills or a fever > 101.  Your arm becomes pale or dark, cool, tingly, or numb.  You develop chest pain, shortness of breath, feel faint or pass out.    You have heavy bleeding from the site, hold pressure on the site for 20 minutes.  If the bleeding stops, apply a fresh bandage and call your cardiologist.  However, if you        continue to have bleeding, call 911 and continue to apply pressure to the site.   You have any symptoms of a stroke.  Remember BE FAST  B-balance. Sudden trouble walking or loss of balance.  E-eyes.  Sudden changes in how you see or a sudden onset of a very bad headache.   F-face. Sudden weakness or loss of feeling of the face or facial droop on one side.   A-arms Sudden weakness or numbness in one arm.  One arm drifts down if they are both held out in front of you. This happens suddenly and usually on one side of the body.   S-speech.  Sudden trouble speaking, slurred speech or trouble understanding what are saying.   T-time  Time to call emergency services.  Write down the symptoms and the time they started.

## 2022-09-02 NOTE — PROGRESS NOTES
Name: Catherine Boyer ADMIT: 2022   : 1940  PCP: Felix Su MD    MRN: [id number] LOS: 1 days   AGE/SEX: 82 y.o. female  ROOM: [facility]     Subjective   Subjective   No events overnight.  Patient resting comfortably in bed.  Complains of fatigue otherwise states that her breathing is okay.    Review of Systems    Neg for CP, N/V/D  +fatigue      Objective   Objective   Vital Signs  Temp:  [98 °F (36.7 °C)-99.2 °F (37.3 °C)] 98 °F (36.7 °C)  Heart Rate:  [81-98] 81  Resp:  [16-18] 16  BP: ()/(43-51) 96/43  SpO2:  [94 %] 94 %  on  Flow (L/min):  [1-2] 2;   Device (Oxygen Therapy): nasal cannula  Body mass index is 29.85 kg/m².  Physical Exam  Constitutional:       Appearance: Normal appearance.   HENT:      Mouth/Throat:      Pharynx: No oropharyngeal exudate or posterior oropharyngeal erythema.   Cardiovascular:      Rate and Rhythm: Normal rate and regular rhythm.      Heart sounds: No murmur heard.    No gallop.   Pulmonary:      Effort: Pulmonary effort is normal. No respiratory distress.      Breath sounds: No wheezing.   Abdominal:      General: Abdomen is flat. There is no distension.      Palpations: Abdomen is soft.      Tenderness: There is no abdominal tenderness.   Neurological:      General: No focal deficit present.      Mental Status: She is alert and oriented to person, place, and time.         Results Review     I reviewed the patient's new clinical results.  Results from last 7 days   Lab Units 22  0617 22  2131   WBC 10*3/mm3 29.57* 24.36*   HEMOGLOBIN g/dL 9.1* 9.3*   PLATELETS 10*3/mm3 238 249     Results from last 7 days   Lab Units 22  0617 22  2131   SODIUM mmol/L 142 137   POTASSIUM mmol/L 4.2 4.5   CHLORIDE mmol/L 105 104   CO2 mmol/L 24.2 20.6*   BUN mg/dL 33* 34*   CREATININE mg/dL 0.96 1.17*   GLUCOSE mg/dL 113* 204*   EGFR mL/min/1.73 58.5* 46.1*     Results from last 7 days   Lab Units 22  2131   ALBUMIN g/dL 4.50   BILIRUBIN mg/dL  [Never] : Never 0.4   ALK PHOS U/L 91   AST (SGOT) U/L 16   ALT (SGPT) U/L 28     Results from last 7 days   Lab Units 09/01/22  0617 08/31/22  2131   CALCIUM mg/dL 9.1 9.2   ALBUMIN g/dL  --  4.50   MAGNESIUM mg/dL 1.7  --        Hemoglobin A1C   Date/Time Value Ref Range Status   09/01/2022 0616 6.60 (H) 4.80 - 5.60 % Final     Glucose   Date/Time Value Ref Range Status   09/02/2022 0644 115 70 - 130 mg/dL Final     Comment:     Meter: [id number] : [id number] Marciano Luther NA   09/01/2022 2049 132 (H) 70 - 130 mg/dL Final     Comment:     Meter: [id number] : [id number] Marciano Burrowsliss NA   09/01/2022 1605 161 (H) 70 - 130 mg/dL Final     Comment:     Meter: [id number] : [id number] Bakari Velasquez NA   09/01/2022 1121 140 (H) 70 - 130 mg/dL Final     Comment:     Meter: [id number] : [id number] Mayco BENTLEY       XR Chest 1 View    Result Date: 8/31/2022  Increased interstitial markings are predominantly perihilar and suspected to represent hydrostatic interstitial pulmonary edema. There are no previous studies for comparison.  This report was finalized on 8/31/2022 9:54 PM by Dr. Torey Bedoya M.D.      Scheduled Medications  apixaban, 5 mg, Oral, Q12H  atorvastatin, 80 mg, Oral, Nightly  clopidogrel, 75 mg, Oral, Daily  furosemide, 40 mg, Intravenous, Q12H  insulin lispro, 0-9 Units, Subcutaneous, TID With Meals  lisinopril, 10 mg, Oral, Daily  metoprolol succinate XL, 25 mg, Oral, BID  pantoprazole, 40 mg, Oral, Daily  ruxolitinib, 15 mg, Oral, BID  sertraline, 50 mg, Oral, Daily    Infusions   Diet  NPO Diet NPO Type: Strict NPO       Assessment/Plan     Active Hospital Problems    Diagnosis  POA   • **Acute on chronic diastolic heart failure (HCC) [I50.33]  Yes   • Chest pain with high risk for cardiac etiology [R07.9]  Yes   • GERD (gastroesophageal reflux disease) [K21.9]  Yes   • Hypertension [I10]  Yes   • TIA (transient ischemic attack) [G45.9]  Yes   • CAD (coronary artery disease) [I25.10]  Yes   •  [Yes] : Yes 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       82 y.o. female admitted with Acute on chronic diastolic heart failure (HCC).      Acute on chronic diastolic heart failure  Acute hypoxic respiratory failure  -proBNP 2500 OA  -TTE (9/1/22): EF 57%   -Cardiology following  -IV Lasix 40 every 12 hours    CAD  Chest pain  -s/p 2 LEFTY 2/2022  -Continue Plavix, atorvastatin 80mg  -Troponin negative, EKG without acute ischemic changes  -Cardiology planning nuclear stress    Paroxysmal Afib  -RC: Metoprolol 25 mg  -AC: Apixaban    Myeloproliferative disorder  Anemia, macrocytic  Leukocytosis  -WBC increasing from last OP appointment  -will ask hematology to evaluate    DM2  -Last A1c 6.6 (9/1/22)  -SSI  -Monitor blood glucose closely    HTN  -Holding home lisinopril 10mg given softer BPs  -metoprolol 25mg      · Eliquis (home med) for DVT prophylaxis.  · DNR.  · Discussed with patient and care team on multidisciplinary rounds.  · Anticipate discharge home with HH vs SNU facility when cleared by consultants.      Anthony Muñoz MD  [facility]  09/02/22  07:24 EDT       [

## 2022-09-02 NOTE — PAYOR COMM NOTE
"Maribell Boyer (82 y.o. Female)     PLEASE SEE ATTACHED FOR INPT AUTH   REF # [id number]      PLEASE CALL SARA @ [phone] OR [phone]    THANK YOU   SARA SAMUELS RN/CCP             Date of Birth   1940    Social Security Number       Address   [address] [place] [address] [address] [place] [address]    Home Phone   [phone]    MRN   [id number]       P[employer]ism   Unknown    Marital Status                               Admission Date   8/31/22    Admission Type   Emergency    Admitting Provider   Oseas Song MD    Attending Provider   Anthony Muñoz MD    Department, Room/Bed   [address], S613/1       Discharge Date       Discharge Disposition       Discharge Destination                               Attending Provider: Anthony Muñoz MD    Allergies: Aspirin, Oxycodone    Isolation: None   Infection: None   Code Status: No CPR   Advance Care Planning Activity    Ht: 154.9 cm (61\")   Wt: 71.7 kg (158 lb)    Admission Cmt: None   Principal Problem: Acute on chronic diastolic heart failure (HCC) [I50.33]                 Active Insurance as of 8/31/2022     Primary Coverage     Payor Plan Insurance Group Employer/Plan Group    AETNA MEDICARE REPLACEMENT AETNA MEDICARE REPLACEMENT [id number]     Payor Plan Address Payor Plan Phone Number Payor Plan Fax Number Effective Dates    [address] [phone]  1/1/2022 - None Entered    [facility] [id number]       Subscriber Name Subscriber Birth Date Member ID       MARIBELL BOYER 1940 [id number]                 Emergency Contacts      (Rel.) Home Phone Work Phone Mobile Phone    RAY BOYER (Daughter) [phone] -- [phone]    Karen Gutierrez (Daughter) -- -- [phone]    Greg Boyer (Son) -- -- [phone]            [place]: NPI [id number]  Tax ID [id number]     History & Physical      Oseas Song MD at 08/31/22 2301              Patient Name:  Maribell" Merlin  YOB: 1940  MRN:  [id number]  Admit Date:  8/31/2022  Patient Care Team:  Provider, No Known as PCP - Gerard Hearn MD as Referring Physician (Medical Oncology)  Darrell Reinoso MD as Consulting Physician (Hematology and Oncology)      Subjective   History Present Illness     Chief Complaint   Patient presents with   • Chest Pain       Ms. Boyer is a 82 y.o.  woman with coronary artery disease s/p 2 LEFTY in February 2022 on eliquis and plavix, chronic diastolic heart failure, GERD, paroxysmal afib on eliquis, hypertension, myeloproliferative disorder, porcelain gallbladder, history of nonbacterial endocarditis, type 2 DM who presents from her nursing facility with chest pressure radiating to her bilateral shoulders that lasted about an hour. She reports that she tried to take some tylenol without effect. Lying back seemed to worsen the pain, but there were no other modifying factors. Her pain has since resolved.    History of Present Illness  Review of Systems   Constitutional: Negative for chills, diaphoresis and fever.   HENT: Negative for sinus pressure and sinus pain.    Eyes: Negative.    Respiratory: Positive for shortness of breath. Negative for cough.    Cardiovascular: Positive for chest pain. Negative for palpitations and leg swelling.   Gastrointestinal: Negative for abdominal pain, diarrhea, nausea and vomiting.   Endocrine: Negative for polydipsia and polyuria.   Genitourinary: Negative for dysuria, frequency and urgency.   Musculoskeletal: Negative for arthralgias and myalgias.   Skin: Negative for rash and wound.   Allergic/Immunologic: Negative.    Neurological: Negative for light-headedness and headaches.   Hematological: Negative.    Psychiatric/Behavioral: Negative for confusion and decreased concentration.        Personal History     Past Medical History:   Diagnosis Date   • Atrial fibrillation (HCC)    • Breast cancer (HCC)    • CAD (coronary artery disease)      "NSTEMI 2/2022: 90% ostial LAD, 99% D1, 70% mid-distal LAD (medical therapy). She received two stents (2.5x18 and 2.5x26mm [place] LEFTY) but I don't know which one went to which lesion.   • Carotid atherosclerosis    • Chronic diastolic (congestive) heart failure (HCC)    • GERD (gastroesophageal reflux disease)    • Glaucoma    • History of cataract    • Hypertension    • Microcytic anemia     per Dr. oleg pate office  note 6/30/22-dd   • Myeloproliferative disorder (HCC)     JAK2 positive   • Nonbacterial thrombotic endocarditis     6/2021: 4x5mm vegetation on the ventricular surface of the anterior MV, negative blood cultures   • Porcelain gallbladder    • PUD (peptic ulcer disease)    • TIA (transient ischemic attack)    • Type 2 diabetes mellitus (HCC)    • Upper GI bleed      Past Surgical History:   Procedure Laterality Date   • BREAST LUMPECTOMY  1999   • CATARACT EXTRACTION  2011     Family History   Problem Relation Age of Onset   • Ovarian cancer Mother    • Lung cancer Father    • Lung cancer Sister      Social History     Tobacco Use   • Smoking status: Former Smoker     Packs/day: 1.00     Years: 20.00     Pack years: 20.00   • Smokeless tobacco: Never Used   • Tobacco comment: 30  years ago   Substance Use Topics   • Alcohol use: Yes     Comment: \"rare\"   • Drug use: Never     No current facility-administered medications on file prior to encounter.     Current Outpatient Medications on File Prior to Encounter   Medication Sig Dispense Refill   • acetaminophen (TYLENOL) 500 MG tablet Take 500 mg by mouth Every 4 (Four) Hours As Needed.     • apixaban (ELIQUIS) 5 MG tablet tablet Take 1 tablet by mouth Every 12 (Twelve) Hours for 180 days. 60 tablet 5   • atorvastatin (LIPITOR) 80 MG tablet Take 1 tablet by mouth Every Night.     • bimatoprost (LUMIGAN) 0.01 % ophthalmic drops Apply 1 drop to eye(s) as directed by provider.     • clopidogrel (PLAVIX) 75 MG tablet Take 1 tablet by mouth Daily.     • " furosemide (LASIX) 40 MG tablet Take 1 tablet by mouth Daily. 90 tablet 3   • Jakafi 15 MG chemo tablet Take 1 tablet by mouth 2 (Two) Times a Day. 60 tablet 3   • latanoprost (XALATAN) 0.005 % ophthalmic solution      • lisinopril (PRINIVIL,ZESTRIL) 10 MG tablet Take 1 tablet by mouth Daily.     • metoprolol succinate XL (TOPROL-XL) 25 MG 24 hr tablet 25 mg 2 (Two) Times a Day.     • pantoprazole (PROTONIX) 40 MG EC tablet Take 40 mg by mouth Daily.     • sertraline (ZOLOFT) 50 MG tablet Take 50 mg by mouth Daily.       Allergies   Allergen Reactions   • Aspirin Unknown - Low Severity   • Oxycodone Unknown - Low Severity       Objective    Objective     Vital Signs  Temp:  [97.9 °F (36.6 °C)] 97.9 °F (36.6 °C)  Heart Rate:  [110] 110  Resp:  [18] 18  BP: (147)/(66) 147/66  SpO2:  [94 %] 94 %  on  Flow (L/min):  [3] 3;   Device (Oxygen Therapy): nasal cannula  There is no height or weight on file to calculate BMI.    Physical Exam  Vitals and nursing note reviewed.   Constitutional:       General: She is not in acute distress.     Appearance: She is not toxic-appearing.   HENT:      Head: Normocephalic and atraumatic.      Mouth/Throat:      Mouth: Mucous membranes are moist.      Pharynx: Oropharynx is clear. No oropharyngeal exudate.   Eyes:      Extraocular Movements: Extraocular movements intact.      Conjunctiva/sclera: Conjunctivae normal.      Pupils: Pupils are equal, round, and reactive to light.   Cardiovascular:      Rate and Rhythm: Normal rate and regular rhythm.      Heart sounds: Normal heart sounds.   Pulmonary:      Effort: Pulmonary effort is normal. No respiratory distress.      Breath sounds: Normal breath sounds. No wheezing, rhonchi or rales.   Abdominal:      General: Bowel sounds are normal. There is no distension.      Palpations: Abdomen is soft.      Tenderness: There is no abdominal tenderness. There is no guarding or rebound.   Musculoskeletal:         General: No tenderness.       Cervical back: Normal range of motion and neck supple.      Right lower leg: No edema.      Left lower leg: No edema.   Skin:     General: Skin is warm and dry.      Capillary Refill: Capillary refill takes less than 2 seconds.      Findings: No erythema or rash.   Neurological:      General: No focal deficit present.      Mental Status: She is alert and oriented to person, place, and time.   Psychiatric:         Mood and Affect: Mood normal.         Behavior: Behavior normal.         Results Review:  I reviewed the patient's new clinical results.  I reviewed the patient's new imaging results and agree with the interpretation.  I reviewed the patient's other test results and agree with the interpretation  I personally viewed and interpreted the patient's EKG/Telemetry data  Discussed with ED provider.    Lab Results (last 24 hours)     Procedure Component Value Units Date/Time    CBC & Differential [[id number]]  (Abnormal) Collected: 08/31/22 2131    Specimen: Blood Updated: 08/31/22 2148    Narrative:      The following orders were created for panel order CBC & Differential.  Procedure                               Abnormality         Status                     ---------                               -----------         ------                     CBC Auto Differential[[id number]]        Abnormal            Final result                 Please view results for these tests on the individual orders.    Comprehensive Metabolic Panel [[id number]]  (Abnormal) Collected: 08/31/22 2131    Specimen: Blood Updated: 08/31/22 2200     Glucose 204 mg/dL      BUN 34 mg/dL      Creatinine 1.17 mg/dL      Sodium 137 mmol/L      Potassium 4.5 mmol/L      Chloride 104 mmol/L      CO2 20.6 mmol/L      Calcium 9.2 mg/dL      Total Protein 6.9 g/dL      Albumin 4.50 g/dL      ALT (SGPT) 28 U/L      AST (SGOT) 16 U/L      Alkaline Phosphatase 91 U/L      Total Bilirubin 0.4 mg/dL      Globulin 2.4 gm/dL      A/G Ratio 1.9 g/dL       BUN/Creatinine Ratio 29.1     Anion Gap 12.4 mmol/L      eGFR 46.1 mL/min/1.73      Comment: National Kidney Foundation and American Society of Nephrology (ASN) Task Force recommended calculation based on the Chronic Kidney Disease Epidemiology Collaboration (CKD-EPI) equation refit without adjustment for race.       Narrative:      GFR Normal >60  Chronic Kidney Disease <60  Kidney Failure <15      Lipase [[id number]]  (Normal) Collected: 08/31/22 2131    Specimen: Blood Updated: 08/31/22 2200     Lipase 38 U/L     Troponin [[id number]]  (Normal) Collected: 08/31/22 2131    Specimen: Blood Updated: 08/31/22 2200     Troponin T 0.010 ng/mL     Narrative:      Troponin T Reference Range:  <= 0.03 ng/mL-   Negative for AMI  >0.03 ng/mL-     Abnormal for myocardial necrosis.  Clinicians would have to utilize clinical acumen, EKG, Troponin and serial changes to determine if it is an Acute Myocardial Infarction or myocardial injury due to an underlying chronic condition.       Results may be falsely decreased if patient taking Biotin.      BNP [[id number]]  (Abnormal) Collected: 08/31/22 2131    Specimen: Blood Updated: 08/31/22 2158     proBNP 2,589.0 pg/mL     Narrative:      Among patients with dyspnea, NT-proBNP is highly sensitive for the detection of acute congestive heart failure. In addition NT-proBNP of <300 pg/ml effectively rules out acute congestive heart failure with 99% negative predictive value.    Results may be falsely decreased if patient taking Biotin.      CBC Auto Differential [[id number]]  (Abnormal) Collected: 08/31/22 2131    Specimen: Blood Updated: 08/31/22 2148     WBC 24.36 10*3/mm3      RBC 2.68 10*6/mm3      Hemoglobin 9.3 g/dL      Hematocrit 27.6 %      .0 fL      MCH 34.7 pg      MCHC 33.7 g/dL      RDW 16.0 %      RDW-SD 58.3 fl      MPV 10.8 fL      Platelets 249 10*3/mm3      nRBC 0.2 /100 WBC     Manual Differential [[id number]]  (Abnormal) Collected: 08/31/22 2131    Specimen:  Blood Updated: 08/31/22 2234     Neutrophil % 81.6 %      Lymphocyte % 9.2 %      Monocyte % 7.1 %      Basophil % 2.0 %      Neutrophils Absolute 19.88 10*3/mm3      Lymphocytes Absolute 2.24 10*3/mm3      Monocytes Absolute 1.73 10*3/mm3      Basophils Absolute 0.49 10*3/mm3      Anisocytosis Slight/1+     Macrocytes Slight/1+     WBC Morphology Normal     Platelet Morphology Normal          Imaging Results (Last 24 Hours)     Procedure Component Value Units Date/Time    XR Chest 1 View [[id number]] Collected: 08/31/22 2135     Updated: 08/31/22 2157    Narrative:      CHEST SINGLE VIEW     HISTORY: Chest pain     COMPARISON: None     FINDINGS: Heart size is borderline enlarged. There are increased  interstitial markings that are suspected to represent hydrostatic  interstitial pulmonary edema. No superimposed airspace disease is  evident. There is no pneumothorax. Thoracic aorta is tortuous and aortic  vascular calcifications are present. There is advanced osteoarthritis at  both shoulders. Several osteochondral bodies are present on the left at  the level of the proximal humeral metaphysis.       Impression:      Increased interstitial markings are predominantly perihilar  and suspected to represent hydrostatic interstitial pulmonary edema.  There are no previous studies for comparison.     This report was finalized on 8/31/2022 9:54 PM by Dr. Torey Bedoya M.D.                 ECG 12 Lead   Preliminary Result   HEART RATE= 96  bpm   RR Interval= 625  ms   MT Interval= 162  ms   P Horizontal Axis= -41  deg   P Front Axis= 54  deg   QRSD Interval= 89  ms   QT Interval= 366  ms   QRS Axis= 30  deg   T Wave Axis= 42  deg   - OTHERWISE NORMAL ECG -   Sinus rhythm   Atrial premature complex   Electronically Signed By:    Date and Time of Study: 2022-08-31 21:18:08           Assessment/Plan     Active Hospital Problems    Diagnosis  POA   • Chest pain with high risk for cardiac etiology [R07.9]  Yes   • GERD  (gastroesophageal reflux disease) [K21.9]  Yes   • Hypertension [I10]  Yes   • TIA (transient ischemic attack) [G45.9]  Yes   • Acute on chronic diastolic heart failure (HCC) [I50.33]  Yes   • CAD (coronary artery disease) [I25.10]  Yes   • 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       Ms. Boyer is a 82 y.o. woman with coronary artery disease s/p 2 LEFTY in February 2022, chronic diastolic heart failure, GERD, paroxysmal afib on eliquis, hypertension, myeloproliferative disorder, porcelain gallbladder, history of nonbacterial endocarditis, type 2 DM who presents with chest pain and a mild heart failure exacerbation.    · Acute on chronic diastolic heart failure causing hypoxia-she received a dose of iv lasix in the ED, and they will be continued. She actually appears fairly euvolemic, so I suspect she won't require diuretics for long. Monitor weights, I/Os, renal function, electrolytes.   · Chest pain-initial troponin was low and ekg looks okay. Will repeat in the morning. Check an echocardiogram. Cardiology consult.   · Leukocytosis-wbc is around her baseline. No signs of infection.  · Anemia-at her baseline  · I discussed the patient's findings and my recommendations with patient and ED provider.    VTE Prophylaxis - Eliquis (home med).  Code Status - Limited code (no CPR, no intubation).       Oseas Song MD  [facility]  08/31/22  23:01 EDT      Electronically signed by Oseas Song MD at 09/01/22 0122          Emergency Department Notes      Darwin Mcclellan RN at 08/31/22 2058        .Pt masked on arrival, staff masked    Pt from [facility] facility via ems, staff called for upper chest pain that started about an hour ago,  worse when laying down, RA sat was 83 with improvement to 94 on 3L NC    Electronically signed by Darwin Mcclellan RN at 08/31/22 2059     Jamel Emmanuel II, MD at 08/31/22 2220           EMERGENCY DEPARTMENT ENCOUNTER    Room Number:  10/10  Date of encounter:  8/31/2022  PCP: Provider, No Known  Historian: Patient, family      HPI:  Chief Complaint: Chest pain  A complete HPI/ROS/PMH/PSH/SH/FH are unobtainable due to: None    Context: Catherine Boyer is a 82 y.o. female who presents to the ED c/o chest pain.  Onset 7:30 PM.  It lasted for 1 hour.  It felt like a dull ache.  It radiated across to her bilateral shoulders.  Worse with exertion.  This began when she was walking.  She states that she is currently asymptomatic.  She was feeling short of breath and clammy when this started.  No headache, vomiting.  No abdominal pain.      PAST MEDICAL HISTORY  Active Ambulatory Problems     Diagnosis Date Noted   • Chronic diastolic (congestive) heart failure (HCC) 07/14/2022   • CAD (coronary artery disease) 07/14/2022   • Nonbacterial thrombotic endocarditis 07/14/2022   • Paroxysmal atrial fibrillation (HCC) 07/14/2022   • Myeloproliferative disorder (HCC) 07/14/2022   • Microcytic anemia 07/14/2022   • Type 2 diabetes mellitus (HCC) 07/14/2022     Resolved Ambulatory Problems     Diagnosis Date Noted   • No Resolved Ambulatory Problems     Past Medical History:   Diagnosis Date   • Atrial fibrillation (HCC)    • Breast cancer (HCC)    • Carotid atherosclerosis    • GERD (gastroesophageal reflux disease)    • Glaucoma    • History of cataract    • Hypertension    • Porcelain gallbladder    • PUD (peptic ulcer disease)    • TIA (transient ischemic attack)    • Upper GI bleed          PAST SURGICAL HISTORY  Past Surgical History:   Procedure Laterality Date   • BREAST LUMPECTOMY  1999   • CATARACT EXTRACTION  2011         FAMILY HISTORY  Family History   Problem Relation Age of Onset   • Ovarian cancer Mother   "  • Lung cancer Father    • Lung cancer Sister          SOCIAL HISTORY  Social History     Socioeconomic History   • Marital status:    Tobacco Use   • Smoking status: Former Smoker     Packs/day: 1.00     Years: 20.00     Pack years: 20.00   • Smokeless tobacco: Never Used   • Tobacco comment: 30  years ago   Substance and Sexual Activity   • Alcohol use: Yes     Comment: \"rare\"   • Drug use: Never         ALLERGIES  Aspirin and Oxycodone        REVIEW OF SYSTEMS  Review of Systems     All systems reviewed and negative except for those discussed in HPI.       PHYSICAL EXAM    I have reviewed the triage vital signs and nursing notes.    ED Triage Vitals [08/31/22 2100]   Temp Heart Rate Resp BP SpO2   97.9 °F (36.6 °C) 110 18 147/66 94 %      Temp src Heart Rate Source Patient Position BP Location FiO2 (%)   -- -- -- -- --       Physical Exam  GENERAL: not distressed  HENT: nares patent  EYES: no scleral icterus  CV: regular rhythm, regular rate  RESPIRATORY: normal effort, clear to auscultation bilaterally  ABDOMEN: soft, nontender  MUSCULOSKELETAL: no deformity, trace lower extremity edema   NEURO: alert, moves all extremities, follows commands  SKIN: warm, dry  PSYCH: Anxious affect      LAB RESULTS  Recent Results (from the past 24 hour(s))   ECG 12 Lead    Collection Time: 08/31/22  9:18 PM   Result Value Ref Range    QT Interval 366 ms   Comprehensive Metabolic Panel    Collection Time: 08/31/22  9:31 PM    Specimen: Blood   Result Value Ref Range    Glucose 204 (H) 65 - 99 mg/dL    BUN 34 (H) 8 - 23 mg/dL    Creatinine 1.17 (H) 0.57 - 1.00 mg/dL    Sodium 137 136 - 145 mmol/L    Potassium 4.5 3.5 - 5.2 mmol/L    Chloride 104 98 - 107 mmol/L    CO2 20.6 (L) 22.0 - 29.0 mmol/L    Calcium 9.2 8.6 - 10.5 mg/dL    Total Protein 6.9 6.0 - 8.5 g/dL    Albumin 4.50 3.50 - 5.20 g/dL    ALT (SGPT) 28 1 - 33 U/L    AST (SGOT) 16 1 - 32 U/L    Alkaline Phosphatase 91 39 - 117 U/L    Total Bilirubin 0.4 0.0 - 1.2 " mg/dL    Globulin 2.4 gm/dL    A/G Ratio 1.9 g/dL    BUN/Creatinine Ratio 29.1 (H) 7.0 - 25.0    Anion Gap 12.4 5.0 - 15.0 mmol/L    eGFR 46.1 (L) >60.0 mL/min/1.73   Lipase    Collection Time: 08/31/22  9:31 PM    Specimen: Blood   Result Value Ref Range    Lipase 38 13 - 60 U/L   Troponin    Collection Time: 08/31/22  9:31 PM    Specimen: Blood   Result Value Ref Range    Troponin T 0.010 0.000 - 0.030 ng/mL   BNP    Collection Time: 08/31/22  9:31 PM    Specimen: Blood   Result Value Ref Range    proBNP 2,589.0 (H) 0.0 - 1,800.0 pg/mL   CBC Auto Differential    Collection Time: 08/31/22  9:31 PM    Specimen: Blood   Result Value Ref Range    WBC 24.36 (H) 3.40 - 10.80 10*3/mm3    RBC 2.68 (L) 3.77 - 5.28 10*6/mm3    Hemoglobin 9.3 (L) 12.0 - 15.9 g/dL    Hematocrit 27.6 (L) 34.0 - 46.6 %    .0 (H) 79.0 - 97.0 fL    MCH 34.7 (H) 26.6 - 33.0 pg    MCHC 33.7 31.5 - 35.7 g/dL    RDW 16.0 (H) 12.3 - 15.4 %    RDW-SD 58.3 (H) 37.0 - 54.0 fl    MPV 10.8 6.0 - 12.0 fL    Platelets 249 140 - 450 10*3/mm3    nRBC 0.2 0.0 - 0.2 /100 WBC       Ordered the above labs and independently reviewed the results.        RADIOLOGY  XR Chest 1 View    Result Date: 8/31/2022  CHEST SINGLE VIEW  HISTORY: Chest pain  COMPARISON: None  FINDINGS: Heart size is borderline enlarged. There are increased interstitial markings that are suspected to represent hydrostatic interstitial pulmonary edema. No superimposed airspace disease is evident. There is no pneumothorax. Thoracic aorta is tortuous and aortic vascular calcifications are present. There is advanced osteoarthritis at both shoulders. Several osteochondral bodies are present on the left at the level of the proximal humeral metaphysis.      Increased interstitial markings are predominantly perihilar and suspected to represent hydrostatic interstitial pulmonary edema. There are no previous studies for comparison.  This report was finalized on 8/31/2022 9:54 PM by Dr. Lema  JUVE Bedoya.        I ordered the above noted radiological studies. Reviewed by me and discussed with radiologist.  See dictation for official radiology interpretation.      PROCEDURES    Procedures      MEDICATIONS GIVEN IN ER    Medications   sodium chloride 0.9 % flush 10 mL (has no administration in time range)   furosemide (LASIX) injection 80 mg (has no administration in time range)         PROGRESS, DATA ANALYSIS, CONSULTS, AND MEDICAL DECISION MAKING    All labs have been independently reviewed by me.  All radiology studies have been reviewed by me and discussed with radiologist dictating the report.   EKG's independently viewed and interpreted by me.  Discussion below represents my analysis of pertinent findings related to patient's condition, differential diagnosis, treatment plan and final disposition.    Differential diagnosis includes but not limited to acute coronary syndrome, pulmonary embolism, thoracic aortic dissection, pneumonia, pneumothorax, musculoskeletal pain, GERD or esophageal spasm, anxiety, myocarditis/pericarditis, esophageal rupture, pancreatitis.     History: 1  EC  Age: 1  Risk factors: 2    HEART score: 5    ED Course as of 08/31/22 2256   Wed Aug 31, 2022   2124 EKG interpreted by myself.  Time 9:18 PM.  Sinus rhythm.  Heart rate 96.  Normal intervals and axis.  No acute ST abnormality.  Premature atrial complexes noted. [TD]    WBC(!): 24.36 [TD]    Chest x-ray interpreted myself.  Interstitial markings consistent with pulmonary edema. [TD]   215 Patient has a history of myeloproliferative disorder [TD]   2155 On medical chart review, patient last saw Dr. Reinoso on 2022.  She is followed for myelofibrosis on Jakafi..  Initially seen on 2021 with leukocytosis with a white blood cell count of 104,000.  JAK2 positive.  Patient has a known history of coronary artery disease, mild systolic heart failure and history of endocarditis as well as atrial fibrillation.   She is on Eliquis.  Per chart review, coronary angiography revealed severe disease of the LAD and diagonal and she underwent PCI x2.  She has a long lesion in the mid distal LAD that is being managed medically.  Her last echocardiogram was in February 2022.  EF 45 to 50% with apical hypokinesis.  She recently moved to [place] from [place] to be closer to her family. [TD]   2213 Troponin T: 0.010 [TD]   2213 WBC(!): 24.36  Chronically elevated [TD]   2255 I discussed the case with Dr. Song, hospitalist for [facility].  Reviewed patient's labs, history, imaging.  He will admit to telemetry. [TD]      ED Course User Index  [TD] Jamel Emmanuel II, MD         PPE: The patient wore a surgical mask throughout the entire patient encounter. I wore an N95.    AS OF 22:20 EDT VITALS:    BP - 147/66  HR - 110  TEMP - 97.9 °F (36.6 °C)  O2 SATS - 94%      DIAGNOSIS  Final diagnoses:   Chest pain with high risk for cardiac etiology       DISPOSITION  Admit           Jamel Emmanuel II, MD  08/31/22 2257      Electronically signed by Jamel Emmanuel II, MD at 08/31/22 2257       Oxygen Therapy (last 3 days)     Date/Time SpO2 Device (Oxygen Therapy) Flow (L/min) Oxygen Concentration (%) ETCO2 (mmHg)    09/02/22 0851 -- nasal cannula 2 -- --    09/02/22 0828 97 nasal cannula 2 -- --    09/02/22 0100 -- nasal cannula 2 -- --    09/02/22 0008 94 nasal cannula 1 -- --    09/01/22 2030 -- nasal cannula 1.5 -- --    09/01/22 1428 -- nasal cannula 2 -- --    09/01/22 1356 -- nasal cannula 2 -- --    09/01/22 0815 -- -- 2 -- --    09/01/22 0325 -- nasal cannula 2 -- --    09/01/22 0249 93 nasal cannula 3 -- --    08/31/22 2247 95 -- -- -- --    08/31/22 2246 95 -- -- -- --    08/31/22 2100 94 nasal cannula 3 -- --        Intake & Output (last 3 days)       08/30 0701 08/31 0700 08/31 0701 09/01 0700 09/01 0701 09/02 0700 09/02 0701 09/03 0700            Urine Unmeasured Occurrence   2 x     Stool Unmeasured Occurrence     1 x         Lines, Drains & Airways     Active LDAs     Name Placement date Placement time Site Days    Peripheral IV 09/02/22 0645 Anterior;Left Forearm 09/02/22  0645  Forearm  less than 1          Inactive LDAs     Name Placement date Placement time Removal date Removal time Site Days    [REMOVED] Peripheral IV 08/31/22 2329 Anterior;Proximal;Right Forearm 08/31/22  2329  09/02/22  0645  Forearm  1    [REMOVED] External Urinary Catheter 08/31/22  2330  09/01/22  0815  --  less than 1                  Medication Administration Report for Catherine Boyer as of 09/02/22 1348   Legend:    Given Hold Not Given Due Canceled Entry Other Actions    Time Time (Time) Time  Time-Action       Discontinued     Completed     Future     MAR Hold     Linked           Medications 08/31/22 09/01/22 09/02/22    apixaban (ELIQUIS) tablet 5 mg  Dose: 5 mg  Freq: Every 12 Hours Scheduled Route: PO  Indications of Use: ATRIAL FIBRILLATION  Start: 09/01/22 0900   End: 11/01/22 0859   Admin Instructions:   Tablet may be crushed and suspended in 60 mL of water or D5W and immediately delivered via NG tube.     1316-Given     2029-Given         0900 2100            atorvastatin (LIPITOR) tablet 80 mg  Dose: 80 mg  Freq: Nightly Route: PO  Start: 09/01/22 2100   Admin Instructions:   Avoid grapefruit juice.     2029-Given 2100             clopidogrel (PLAVIX) tablet 75 mg  Dose: 75 mg  Freq: Daily Route: PO  Start: 09/01/22 0900     1316-Given          0900             dextrose (D50W) (25 g/50 mL) IV injection 25 g  Dose: 25 g  Freq: Every 15 Minutes PRN Route: IV  PRN Reason: Low Blood Sugar  PRN Comment: Blood Sugar Less Than 70  Start: 09/01/22 0117   Admin Instructions:   Blood sugar less than 70; patient has IV access - Unresponsive, NPO or Unable To Safely Swallow          dextrose (GLUTOSE) oral gel 15 g  Dose: 15 g  Freq: Every 15 Minutes PRN Route: PO  PRN Reason: Low Blood Sugar  PRN Comment: Blood sugar less than  70  Start: 09/01/22 0117   Admin Instructions:   BS<70, Patient Alert, Is not NPO, Can safely swallow.          furosemide (LASIX) injection 40 mg  Dose: 40 mg  Freq: Every 12 Hours Route: IV  Start: 09/02/22 0900      (1151)-Not Given [C]     2100            glucagon (human recombinant) (GLUCAGEN DIAGNOSTIC) injection 1 mg  Dose: 1 mg  Freq: Every 15 Minutes PRN Route: IM  PRN Reason: Low Blood Sugar  PRN Comment: Blood Glucose Less Than 70  Start: 09/01/22 0117   Admin Instructions:   Blood Glucose Less Than 70 - Patient Without IV Access - Unresponsive, NPO or Unable To Safely Swallow          insulin lispro (ADMELOG) injection 0-9 Units  Dose: 0-9 Units  Freq: 3 Times Daily With Meals Route: SC  Start: 09/01/22 0800   Admin Instructions:   Correction - Moderate Dose.  40-60 units/day total insulin dose or average weight, on oral agents    Blood glucose 150-199 mg/dL - 2 units  Blood glucose 200-249 mg/dL - 4 units  Blood glucose 250-299 mg/dL - 6 units  Blood glucose 300-349 mg/dL - 7 units  Blood glucose 350-400 mg/dL - 8 units  Blood glucose greater than 400 mg/dL - 9 units and call provider   Caution: Look alike/sound alike drug alert     (0825)-Not Given     (1230)-Not Given     1841-Given        (0800)-Not Given     1200     1800           metoprolol succinate XL (TOPROL-XL) 24 hr tablet 25 mg  Dose: 25 mg  Freq: 2 Times Daily Route: PO  Start: 09/01/22 0900   Admin Instructions:   Do not crush or chew.     1316-Given     2029-Given         0900 2100            pantoprazole (PROTONIX) EC tablet 40 mg  Dose: 40 mg  Freq: Daily Route: PO  Start: 09/01/22 0900   Admin Instructions:   Swallow whole; do not crush, split, or chew.     1316-Given          0900             ruxolitinib (JAKAFI) chemo tablet 15 mg  Dose: 15 mg  Freq: 2 Times Daily Route: PO  Start: 09/01/22 2100   Admin Instructions:   HAZARDOUS - Handle with care     2029-Given          0900 2100            sertraline (ZOLOFT) tablet 50  mg  Dose: 50 mg  Freq: Daily Route: PO  Start: 09/01/22 0900     1316-Given          0900             sodium chloride 0.9 % flush 10 mL  Dose: 10 mL  Freq: As Needed Route: IV  PRN Reason: Line Care  Start: 08/31/22 2106 2029-Given             Completed Medications  Medications 08/31/22 09/01/22 09/02/22       furosemide (LASIX) injection 80 mg  Dose: 80 mg  Freq: Once Route: IV  Start: 08/31/22 2216   End: 08/31/22 2329    2329-Given               perflutren (DEFINITY) 8.476 mg in Sodium Chloride (PF) 0.9 % 10 mL injection  Dose: 2 mL  Freq: Once in Imaging Route: IV  Start: 09/01/22 1100   End: 09/01/22 1005   Admin Instructions:   Mix 1.3 mL of mechanically activated Definity with 8.7 mL of normal saline. A total of 2 mL of the resulting Definity solution was administered.     1005-Given              regadenoson (LEXISCAN) injection 0.4 mg  Dose: 0.4 mg  Freq: Once in Imaging Route: IV  Start: 09/02/22 1345   End: 09/02/22 1200   Admin Instructions:   Administer over approximately 10 seconds.      1200-Given             technetium sestamibi (CARDIOLITE) injection 1 dose  Dose: 1 dose  Freq: Once in Imaging Route: IV  Start: 09/02/22 1200   End: 09/02/22 1200   Order specific questions:   Millicuries: 26        1200-Given             technetium sestamibi (CARDIOLITE) injection 1 dose  Dose: 1 dose  Freq: Once in Imaging Route: IV  Start: 09/02/22 0700   End: 09/02/22 0700   Order specific questions:   Millicuries: 11.4        0700-Given            Discontinued Medications  Medications 08/31/22 09/01/22 09/02/22       furosemide (LASIX) injection 40 mg  Dose: 40 mg  Freq: Every 12 Hours Route: IV  Start: 09/01/22 0800   End: 09/01/22 0112          lisinopril (PRINIVIL,ZESTRIL) tablet 10 mg  Dose: 10 mg  Freq: Daily Route: PO  Start: 09/01/22 0900   End: 09/02/22 1023     1316-Given          (1151)-Not Given             PATIENT SUPPLIED MEDICATION  Dose: 15 mg  Freq: 2 Times Daily Route: PO  Start: 09/01/22 2100    End: 22   Order specific questions:   Drug Name: Jakafi                          Physician Progress Notes (last 72 hours)      Anthony Muñoz MD at 22 0723              Name: Catherine Boyer ADMIT: 2022   : 1940  PCP: Felix Su MD    MRN: [id number] LOS: 1 days   AGE/SEX: 82 y.o. female  ROOM: [facility]     Subjective   Subjective   No events overnight.  Patient resting comfortably in bed.  Complains of fatigue otherwise states that her breathing is okay.    Review of Systems   Neg for CP, N/V/D  +fatigue      Objective   Objective   Vital Signs  Temp:  [98 °F (36.7 °C)-99.2 °F (37.3 °C)] 98 °F (36.7 °C)  Heart Rate:  [81-98] 81  Resp:  [16-18] 16  BP: ()/(43-51) 96/43  SpO2:  [94 %] 94 %  on  Flow (L/min):  [1-2] 2;   Device (Oxygen Therapy): nasal cannula  Body mass index is 29.85 kg/m².  Physical Exam  Constitutional:       Appearance: Normal appearance.   HENT:      Mouth/Throat:      Pharynx: No oropharyngeal exudate or posterior oropharyngeal erythema.   Cardiovascular:      Rate and Rhythm: Normal rate and regular rhythm.      Heart sounds: No murmur heard.    No gallop.   Pulmonary:      Effort: Pulmonary effort is normal. No respiratory distress.      Breath sounds: No wheezing.   Abdominal:      General: Abdomen is flat. There is no distension.      Palpations: Abdomen is soft.      Tenderness: There is no abdominal tenderness.   Neurological:      General: No focal deficit present.      Mental Status: She is alert and oriented to person, place, and time.         Results Review     I reviewed the patient's new clinical results.  Results from last 7 days   Lab Units 22  0617 22  2131   WBC 10*3/mm3 29.57* 24.36*   HEMOGLOBIN g/dL 9.1* 9.3*   PLATELETS 10*3/mm3 238 249     Results from last 7 days   Lab Units 22  0617 22  2131   SODIUM mmol/L 142 137   POTASSIUM mmol/L 4.2 4.5   CHLORIDE mmol/L 105 104   CO2 mmol/L 24.2 20.6*   BUN  mg/dL 33* 34*   CREATININE mg/dL 0.96 1.17*   GLUCOSE mg/dL 113* 204*   EGFR mL/min/1.73 58.5* 46.1*     Results from last 7 days   Lab Units 08/31/22  2131   ALBUMIN g/dL 4.50   BILIRUBIN mg/dL 0.4   ALK PHOS U/L 91   AST (SGOT) U/L 16   ALT (SGPT) U/L 28     Results from last 7 days   Lab Units 09/01/22  0617 08/31/22  2131   CALCIUM mg/dL 9.1 9.2   ALBUMIN g/dL  --  4.50   MAGNESIUM mg/dL 1.7  --        Hemoglobin A1C   Date/Time Value Ref Range Status   09/01/2022 0616 6.60 (H) 4.80 - 5.60 % Final     Glucose   Date/Time Value Ref Range Status   09/02/2022 0644 115 70 - 130 mg/dL Final     Comment:     Meter: [id number] : [id number] Marciano Luther NA   09/01/2022 2049 132 (H) 70 - 130 mg/dL Final     Comment:     Meter: [id number] : [id number] Marciano Burrowsliss NA   09/01/2022 1605 161 (H) 70 - 130 mg/dL Final     Comment:     Meter: [id number] : [id number] Bakari Velasquez NA   09/01/2022 1121 140 (H) 70 - 130 mg/dL Final     Comment:     Meter: [id number] : [id number] Mayco Floresyumikoroni BENTLEY       XR Chest 1 View    Result Date: 8/31/2022  Increased interstitial markings are predominantly perihilar and suspected to represent hydrostatic interstitial pulmonary edema. There are no previous studies for comparison.  This report was finalized on 8/31/2022 9:54 PM by Dr. Torey Bedoya M.D.      Scheduled Medications  apixaban, 5 mg, Oral, Q12H  atorvastatin, 80 mg, Oral, Nightly  clopidogrel, 75 mg, Oral, Daily  furosemide, 40 mg, Intravenous, Q12H  insulin lispro, 0-9 Units, Subcutaneous, TID With Meals  lisinopril, 10 mg, Oral, Daily  metoprolol succinate XL, 25 mg, Oral, BID  pantoprazole, 40 mg, Oral, Daily  ruxolitinib, 15 mg, Oral, BID  sertraline, 50 mg, Oral, Daily    Infusions   Diet  NPO Diet NPO Type: Strict NPO      Assessment/Plan     Active Hospital Problems    Diagnosis  POA   • **Acute on chronic diastolic heart failure (HCC) [I50.33]  Yes   • Chest pain with high risk for cardiac etiology [R07.9]   Yes   • GERD (gastroesophageal reflux disease) [K21.9]  Yes   • Hypertension [I10]  Yes   • TIA (transient ischemic attack) [G45.9]  Yes   • CAD (coronary artery disease) [I25.10]  Yes   • 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       82 y.o. female admitted with Acute on chronic diastolic heart failure (HCC).      Acute on chronic diastolic heart failure  Acute hypoxic respiratory failure  -proBNP 2500 OA  -TTE (22): EF 57%   -Cardiology following  -IV Lasix 40 every 12 hours    CAD  Chest pain  -s/p 2 LEFTY 2022  -Continue Plavix, atorvastatin 80mg  -Troponin negative, EKG without acute ischemic changes  -Cardiology planning nuclear stress    Paroxysmal Afib  -RC: Metoprolol 25 mg  -AC: Apixaban    Myeloproliferative disorder  Anemia, macrocytic  Leukocytosis  -WBC increasing from last OP appointment  -will ask hematology to evaluate    DM2  -Last A1c 6.6 (22)  -SSI  -Monitor blood glucose closely    HTN  -Holding home lisinopril 10mg given softer BPs  -metoprolol 25mg      · Eliquis (home med) for DVT prophylaxis.  · DNR.  · Discussed with patient and care team on multidisciplinary rounds.  · Anticipate discharge home with  vs SNU facility when cleared by consultants.      Anthony Muñoz MD  [facility]  22  07:24 EDT        Electronically signed by Anthony Muñoz MD at 22 1036     Anthony Muñoz MD at 22 0743              Name: Catherine Boyer ADMIT: 2022   : 1940  PCP: Felix Su MD    MRN: [id number] LOS: 0 days   AGE/SEX: 82 y.o. female  ROOM: [facility]     Subjective   Subjective   Patient just returned from echo upon arrival to the room.  States that she is feeling  good this morning, a little short of breath.  Does report increased intake of diet Pepsi and Cheeze-Its.    Review of Systems   Neg for CP, N/V/D  +SOA      Objective   Objective   Vital Signs  Temp:  [97.9 °F (36.6 °C)] 97.9 °F (36.6 °C)  Heart Rate:  [] 89  Resp:  [18] 18  BP: (147-149)/(66-80) 148/80  SpO2:  [93 %-95 %] 93 %  on  Flow (L/min):  [2-3] 2;   Device (Oxygen Therapy): nasal cannula  Body mass index is 29.85 kg/m².  Physical Exam  Constitutional:       Appearance: Normal appearance.   HENT:      Mouth/Throat:      Pharynx: No oropharyngeal exudate or posterior oropharyngeal erythema.   Cardiovascular:      Rate and Rhythm: Normal rate and regular rhythm.      Heart sounds: No murmur heard.    No gallop.   Pulmonary:      Effort: Pulmonary effort is normal. No respiratory distress.      Comments: Mild crackles worse in lower lobes; on 2 L NC  Abdominal:      General: Abdomen is flat. There is no distension.      Palpations: Abdomen is soft.      Tenderness: There is no abdominal tenderness.   Neurological:      General: No focal deficit present.      Mental Status: She is alert and oriented to person, place, and time.         Results Review     I reviewed the patient's new clinical results.  Results from last 7 days   Lab Units 09/01/22  0617 08/31/22  2131   WBC 10*3/mm3 29.57* 24.36*   HEMOGLOBIN g/dL 9.1* 9.3*   PLATELETS 10*3/mm3 238 249     Results from last 7 days   Lab Units 09/01/22  0617 08/31/22  2131   SODIUM mmol/L 142 137   POTASSIUM mmol/L 4.2 4.5   CHLORIDE mmol/L 105 104   CO2 mmol/L 24.2 20.6*   BUN mg/dL 33* 34*   CREATININE mg/dL 0.96 1.17*   GLUCOSE mg/dL 113* 204*   EGFR mL/min/1.73 58.5* 46.1*     Results from last 7 days   Lab Units 08/31/22  2131   ALBUMIN g/dL 4.50   BILIRUBIN mg/dL 0.4   ALK PHOS U/L 91   AST (SGOT) U/L 16   ALT (SGPT) U/L 28     Results from last 7 days   Lab Units 09/01/22  0617 08/31/22  2131   CALCIUM mg/dL 9.1 9.2   ALBUMIN g/dL  --  4.50   MAGNESIUM  mg/dL 1.7  --        Hemoglobin A1C   Date/Time Value Ref Range Status   09/01/2022 0616 6.60 (H) 4.80 - 5.60 % Final     Glucose   Date/Time Value Ref Range Status   09/01/2022 1121 140 (H) 70 - 130 mg/dL Final     Comment:     Meter: [id number] : [id number] Mayco BENTLEY       XR Chest 1 View    Result Date: 8/31/2022  Increased interstitial markings are predominantly perihilar and suspected to represent hydrostatic interstitial pulmonary edema. There are no previous studies for comparison.  This report was finalized on 8/31/2022 9:54 PM by Dr. Torey Bedoya M.D.      Scheduled Medications  apixaban, 5 mg, Oral, Q12H  atorvastatin, 80 mg, Oral, Nightly  clopidogrel, 75 mg, Oral, Daily  [START ON 9/2/2022] furosemide, 40 mg, Intravenous, Q12H  insulin lispro, 0-9 Units, Subcutaneous, TID With Meals  lisinopril, 10 mg, Oral, Daily  metoprolol succinate XL, 25 mg, Oral, BID  pantoprazole, 40 mg, Oral, Daily  sertraline, 50 mg, Oral, Daily    Infusions   Diet  NPO Diet NPO Type: Strict NPO      Assessment/Plan     Active Hospital Problems    Diagnosis  POA   • **Acute on chronic diastolic heart failure (HCC) [I50.33]  Yes   • Chest pain with high risk for cardiac etiology [R07.9]  Yes   • GERD (gastroesophageal reflux disease) [K21.9]  Yes   • Hypertension [I10]  Yes   • TIA (transient ischemic attack) [G45.9]  Yes   • CAD (coronary artery disease) [I25.10]  Yes   • 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       82 y.o. female admitted with Acute on chronic diastolic heart failure (HCC).      Acute on chronic diastolic heart failure  Acute hypoxic respiratory failure  -proBNP 2500 OA  -TTE (9/1/22): EF 57%   -Consult cardiology  -IV Lasix 40 every  12    CAD  Chest pain  -s/p 2 LEFTY 2022  -Continue Plavix, atorvastatin 80mg  -Troponin negative, EKG without acute ischemic changes  -Cardiology planning nuclear stress    Paroxysmal Afib  -RC: Metoprolol 25 mg  -AC: Apixaban    Myeloproliferative disorder  Anemia, macrocytic  Leukocytosis  -Near baseline, stable    DM2  -Last A1c 6.6 (22)  -SSI  -Monitor blood glucose closely    HTN  -home lisinopril 10mg, metoprolol 25mg      · Eliquis (home med) for DVT prophylaxis.  · DNR.  · Discussed with patient and care team on multidisciplinary rounds.  · Anticipate discharge home with  vs SNU facility when cleared by consultants.      Anthony Muñoz MD  [facility]  22  11:31 EDT        Electronically signed by Anthony Muñoz MD at 22 1131          Consult Notes (last 72 hours)      Billy Michel MD at 22 0619      Consult Orders    1. Inpatient Consult to Cardiology [[id number]] ordered by Oseas Song MD at 22 2757               Date of Hospital Visit: 2022  Date of consult: 2022  Encounter Provider: Billy Michel MD  Place of Service: [facility] CARDIOLOGY  Patient Name: Catherine Boyer  :1940  Referral Provider: Oseas Song MD    Chief complaint: chest pain    Reason for Consult: CP/CHF    History of Present Illness: Ms Boyer is a 82 year old female with history of hypertension, hyperlipidemia, diabetes, mild carotid disease, prior TIA and right-sided breast cancer.    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 She was diagnosed with new onset atrial fibrillation at that time, with an echocardiogram obtained demonstrating a 4x5mm vegetation/thrombus on the ventricular surface of the mitral valve. Blood cultures were negative and she was diagnosed with nonbacterial endocarditis, treated medically. She remains  anticoagulated with Eliquis.     She also has known coronary artery disease, previously followed by a cardiologist ar [facility] in [place] before recently establishing with Dr. Barriga after moving to the [place]. She was admitted there in February with chest pain/NTSEMI and acute anemia requiring blood transfusion. Cardiac catheterization performed during admission revealed severe disease of the LAD and diagonal resulting in PCI, as well as residual disease of her mid-distal LAD that was treated medically. Repeat echocardiogram also obtained noted mild LV systolic dysfunction, EF 45-50%, apical hypokinesis, grade 2 diastolic dysfunction, a torn MV chord, moderate LAE, mild-moderate TR, and moderate PHTN (49mm Hg).     She presented to [facility] ED 8/31/22 with reports of acute dull exertional chest pain that radiated to her bilateral shoulders, and associated shortness of breath and diaphoresis.     CXR completed upon arrival to the ED noted pulmonary edema.     Troponin negative. proBNP 2,589. Creatinine 1.17 (previously 0.74 6/30/22). WBC 24.36. Hgb 9.3 (previously 9.5 6/30/22).     She was diuresed with IV Lasix and admitted.       Previous Cardiac Testing:    Echocardiogram 9/30/21    Left ventricular systolic function is normal with ejection fraction    estimated at 55-60 %.    No regional wall motion abnormalities are noted.    There is mild concentric left ventricular hypertrophy.    Normal left ventricular diastolic filling pressure.    Mild anterior and posterior mitral annular calcification is present.    Moderate mitral regurgitation.    The appears to be a small calcified echogenic mass near LVOT, likely    represents degenerative valve disease, and is less likely a vegetation.    Study was compared to 2014 echo and the LVOT mass was not present in 2014.    Mild aortic regurgitation is present.    Moderate tricuspid regurgitation.    Normal systolic pulmonary artery pressure (SPAP)  estimated at 32 mmHg (RA    pressure 3 mmHg).    Mild pulmonic regurgitation present.    1/23/2014 55% EF, mild MR.       Past Medical History:   Diagnosis Date   • Atrial fibrillation (HCC)    • Breast cancer (HCC)    • CAD (coronary artery disease)     NSTEMI 2/2022: 90% ostial LAD, 99% D1, 70% mid-distal LAD (medical therapy). She received two stents (2.5x18 and 2.5x26mm [place] LEFTY) but I don't know which one went to which lesion.   • Carotid atherosclerosis    • Chronic diastolic (congestive) heart failure (HCC)    • GERD (gastroesophageal reflux disease)    • Glaucoma    • History of cataract    • Hypertension    • Microcytic anemia     per Dr. oleg pate office  note 6/30/22-dd   • Myeloproliferative disorder (HCC)     JAK2 positive   • Nonbacterial thrombotic endocarditis     6/2021: 4x5mm vegetation on the ventricular surface of the anterior MV, negative blood cultures   • Porcelain gallbladder    • PUD (peptic ulcer disease)    • TIA (transient ischemic attack)    • Type 2 diabetes mellitus (HCC)    • Upper GI bleed        Past Surgical History:   Procedure Laterality Date   • BREAST LUMPECTOMY  1999   • CATARACT EXTRACTION  2011       Medications Prior to Admission   Medication Sig Dispense Refill Last Dose   • acetaminophen (TYLENOL) 500 MG tablet Take 500 mg by mouth Every 4 (Four) Hours As Needed.   8/31/2022 at Unknown time   • apixaban (ELIQUIS) 5 MG tablet tablet Take 1 tablet by mouth Every 12 (Twelve) Hours for 180 days. 60 tablet 5 8/31/2022 at Unknown time   • atorvastatin (LIPITOR) 80 MG tablet Take 1 tablet by mouth Every Night.   8/31/2022 at Unknown time   • clopidogrel (PLAVIX) 75 MG tablet Take 1 tablet by mouth Daily.   8/31/2022 at Unknown time   • furosemide (LASIX) 40 MG tablet Take 1 tablet by mouth Daily. 90 tablet 3 8/31/2022 at Unknown time   • Jakafi 15 MG chemo tablet Take 1 tablet by mouth 2 (Two) Times a Day. 60 tablet 3 8/31/2022 at Unknown time   • latanoprost (XALATAN) 0.005 %  "ophthalmic solution    8/31/2022 at Unknown time   • lisinopril (PRINIVIL,ZESTRIL) 10 MG tablet Take 1 tablet by mouth Daily.   8/31/2022 at Unknown time   • metoprolol succinate XL (TOPROL-XL) 25 MG 24 hr tablet 25 mg 2 (Two) Times a Day.   8/31/2022 at Unknown time   • pantoprazole (PROTONIX) 40 MG EC tablet Take 40 mg by mouth Daily.   8/31/2022 at Unknown time   • sertraline (ZOLOFT) 50 MG tablet Take 50 mg by mouth Daily.   8/31/2022 at Unknown time   • bimatoprost (LUMIGAN) 0.01 % ophthalmic drops Apply 1 drop to eye(s) as directed by provider.          Current Meds  Scheduled Meds:apixaban, 5 mg, Oral, Q12H  atorvastatin, 80 mg, Oral, Nightly  clopidogrel, 75 mg, Oral, Daily  [START ON 9/2/2022] furosemide, 40 mg, Intravenous, Q12H  insulin lispro, 0-9 Units, Subcutaneous, TID With Meals  lisinopril, 10 mg, Oral, Daily  metoprolol succinate XL, 25 mg, Oral, BID  pantoprazole, 40 mg, Oral, Daily  sertraline, 50 mg, Oral, Daily      Continuous Infusions:   PRN Meds:.dextrose  •  dextrose  •  glucagon (human recombinant)  •  [COMPLETED] Insert peripheral IV **AND** sodium chloride    Allergies as of 08/31/2022 - Reviewed 08/31/2022   Allergen Reaction Noted   • Aspirin Unknown - Low Severity 05/26/2022   • Oxycodone Unknown - Low Severity 09/04/2014       Social History     Socioeconomic History   • Marital status:    Tobacco Use   • Smoking status: Former Smoker     Packs/day: 1.00     Years: 20.00     Pack years: 20.00   • Smokeless tobacco: Never Used   • Tobacco comment: 30  years ago   Substance and Sexual Activity   • Alcohol use: Yes     Comment: \"rare\"   • Drug use: Never       Family History   Problem Relation Age of Onset   • Ovarian cancer Mother    • Lung cancer Father    • Lung cancer Sister        REVIEW OF SYSTEMS:   All systems reviewed and pertinent positives include in HPI otherwise negative review of systems.       Objective:   Temp:  [97.9 °F (36.6 °C)] 97.9 °F (36.6 °C)  Heart Rate:  " "[] 89  Resp:  [18] 18  BP: (147-149)/(66-80) 148/80  Body mass index is 29.85 kg/m².  Flowsheet Rows    Flowsheet Row First Filed Value   Admission Height 154.9 cm (61\") Documented at 09/01/2022 0249   Admission Weight 71.9 kg (158 lb 8.2 oz) Documented at 09/01/2022 0249        Vitals:    09/01/22 0249   BP: 148/80   Pulse: 89   Resp: 18   Temp: 97.9 °F (36.6 °C)   SpO2: 93%       General Appearance:    Alert, cooperative, in no acute distress   Head:    Normocephalic, without obvious abnormality, atraumatic   Eyes:            Lids and lashes normal, conjunctivae and sclerae normal, no   icterus, no pallor, corneas clear, PERRLA   Ears:    Ears appear intact with no abnormalities noted   Throat:   No oral lesions, no thrush, oral mucosa moist   Neck:   No adenopathy, supple, trachea midline, no thyromegaly, no   carotid bruit, no JVD   Back:     No kyphosis present, no scoliosis present, no skin lesions, erythema or scars, no tenderness to percussion or palpation, range of motion normal   Lungs:     Clear to auscultation,respirations regular, even and unlabored    Heart:    Regular rhythm and normal rate, normal S1 and S2, no murmur, no gallop, no rub, no click   Chest Wall:    No abnormalities observed   Abdomen:     Normal bowel sounds, no masses, no organomegaly, soft nontender, nondistended, no guarding, no rebound  tenderness   Extremities:   Moves all extremities well, no edema, no cyanosis, no redness   Pulses:   Pulses palpable and equal bilaterally. Normal radial, carotid, femoral, dorsalis pedis and posterior tibial pulses bilaterally. Normal abdominal aorta   Skin:  Neurology:   Psychiatric:   No bleeding, bruising or rash   Normal speech and cranial nerve exam, no focal deficit   Alert and oriented x 3, normal mood and affect                 Review of Data:      Results from last 7 days   Lab Units 09/01/22  0617 08/31/22  2131   SODIUM mmol/L 142 137   POTASSIUM mmol/L 4.2 4.5   CHLORIDE mmol/L " 105 104   CO2 mmol/L 24.2 20.6*   BUN mg/dL 33* 34*   CREATININE mg/dL 0.96 1.17*   CALCIUM mg/dL 9.1 9.2   BILIRUBIN mg/dL  --  0.4   ALK PHOS U/L  --  91   ALT (SGPT) U/L  --  28   AST (SGOT) U/L  --  16   GLUCOSE mg/dL 113* 204*     Results from last 7 days   Lab Units 09/01/22  0617 08/31/22  2131   TROPONIN T ng/mL 0.030 0.010     @LABRCNTbnp@  Results from last 7 days   Lab Units 09/01/22  0617 08/31/22  2131   WBC 10*3/mm3 29.57* 24.36*   HEMOGLOBIN g/dL 9.1* 9.3*   HEMATOCRIT % 28.0* 27.6*   PLATELETS 10*3/mm3 238 249         Results from last 7 days   Lab Units 09/01/22  0617   MAGNESIUM mg/dL 1.7     @LABRCNTIP(chol,trig,hdl,ldl)    EKG 8/31/22    Previous EKG 7/14/22        I personally viewed and interpreted the patient's EKG/Telemetry data  )  Patient Active Problem List   Diagnosis   • Acute on chronic diastolic heart failure (HCC)   • CAD (coronary artery disease)   • Nonbacterial thrombotic endocarditis   • Paroxysmal atrial fibrillation (HCC)   • Myeloproliferative disorder (HCC)   • Microcytic anemia   • Type 2 diabetes mellitus (HCC)   • Chest pain with high risk for cardiac etiology   • GERD (gastroesophageal reflux disease)   • Hypertension   • TIA (transient ischemic attack)         Assessment and Plan:    Ms. 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  Chest pain started last night after she just sat down after getting into her room.  Pain localized to the retrosternal area with radiation to both shoulders that she describes as a severe dull aching that scared her and prompted her to come to the ER.  Pain already slightly  improved when EMS arrived.  She did not receive any treatment by EMS.  Negative troponin and unremarkable EKG and ACS ruled out in the ER.  Treatment received in the ER include Lasix 80 mg IV x1.  Home medications include metoprolol succinate 25 mg twice daily, lisinopril 10 mg p.o. daily, torsemide 40 mg p.o. daily clopidogrel 75 mg daily, atorvastatin 80 mg p.o. daily, Eliquis 5 mg p.o. daily.  Pain eventually resolved.  She was chest pain-free during exam.    1.  Chest pain concerning for angina with underlying known coronary artery disease  -ACS ruled out  -EKG with sinus rhythm and no significant abnormalities.  -Vital signs within range  -Get myocardial perfusion study    Continue home medications with monitoring.    Thank you for consulting cardiology    Billy Michel MD  09/01/22  10:16 EDT.  Time spent in reviewing chart, discussion and examination:                    Electronically signed by Billy Michel MD at 09/01/22 4276

## 2022-09-03 LAB
ANION GAP SERPL CALCULATED.3IONS-SCNC: 12.5 MMOL/L (ref 5–15)
ANISOCYTOSIS BLD QL: ABNORMAL
BUN SERPL-MCNC: 34 MG/DL (ref 8–23)
BUN/CREAT SERPL: 34.7 (ref 7–25)
CALCIUM SPEC-SCNC: 8.7 MG/DL (ref 8.6–10.5)
CHLORIDE SERPL-SCNC: 103 MMOL/L (ref 98–107)
CO2 SERPL-SCNC: 23.5 MMOL/L (ref 22–29)
CREAT SERPL-MCNC: 0.98 MG/DL (ref 0.57–1)
DEPRECATED RDW RBC AUTO: 57.7 FL (ref 37–54)
EGFRCR SERPLBLD CKD-EPI 2021: 57 ML/MIN/1.73
EOSINOPHIL # BLD MANUAL: 1.06 10*3/MM3 (ref 0–0.4)
EOSINOPHIL NFR BLD MANUAL: 4 % (ref 0.3–6.2)
ERYTHROCYTE [DISTWIDTH] IN BLOOD BY AUTOMATED COUNT: 16.2 % (ref 12.3–15.4)
GIANT PLATELETS: ABNORMAL
GLUCOSE BLDC GLUCOMTR-MCNC: 107 MG/DL (ref 70–130)
GLUCOSE BLDC GLUCOMTR-MCNC: 109 MG/DL (ref 70–130)
GLUCOSE BLDC GLUCOMTR-MCNC: 135 MG/DL (ref 70–130)
GLUCOSE BLDC GLUCOMTR-MCNC: 152 MG/DL (ref 70–130)
GLUCOSE SERPL-MCNC: 91 MG/DL (ref 65–99)
HCT VFR BLD AUTO: 25.6 % (ref 34–46.6)
HGB BLD-MCNC: 8.9 G/DL (ref 12–15.9)
LYMPHOCYTES # BLD MANUAL: 3.22 10*3/MM3 (ref 0.7–3.1)
LYMPHOCYTES NFR BLD MANUAL: 5.1 % (ref 5–12)
MAGNESIUM SERPL-MCNC: 1.8 MG/DL (ref 1.6–2.4)
MCH RBC QN AUTO: 34 PG (ref 26.6–33)
MCHC RBC AUTO-ENTMCNC: 34.8 G/DL (ref 31.5–35.7)
MCV RBC AUTO: 97.7 FL (ref 79–97)
METAMYELOCYTES NFR BLD MANUAL: 1 % (ref 0–0)
MONOCYTES # BLD: 1.36 10*3/MM3 (ref 0.1–0.9)
NEUTROPHILS # BLD AUTO: 20.69 10*3/MM3 (ref 1.7–7)
NEUTROPHILS NFR BLD MANUAL: 77.8 % (ref 42.7–76)
NRBC BLD AUTO-RTO: 0.1 /100 WBC (ref 0–0.2)
PHOSPHATE SERPL-MCNC: 4.7 MG/DL (ref 2.5–4.5)
PLATELET # BLD AUTO: 216 10*3/MM3 (ref 140–450)
PMV BLD AUTO: 11.3 FL (ref 6–12)
POIKILOCYTOSIS BLD QL SMEAR: ABNORMAL
POLYCHROMASIA BLD QL SMEAR: ABNORMAL
POTASSIUM SERPL-SCNC: 4.2 MMOL/L (ref 3.5–5.2)
QT INTERVAL: 404 MS
RBC # BLD AUTO: 2.62 10*6/MM3 (ref 3.77–5.28)
SODIUM SERPL-SCNC: 139 MMOL/L (ref 136–145)
VARIANT LYMPHS NFR BLD MANUAL: 12.1 % (ref 19.6–45.3)
WBC MORPH BLD: NORMAL
WBC NRBC COR # BLD: 26.59 10*3/MM3 (ref 3.4–10.8)

## 2022-09-03 PROCEDURE — 85007 BL SMEAR W/DIFF WBC COUNT: CPT | Performed by: INTERNAL MEDICINE

## 2022-09-03 PROCEDURE — 63710000001 RUXOLITINIB 15 MG TABLET: Performed by: INTERNAL MEDICINE

## 2022-09-03 PROCEDURE — 94762 N-INVAS EAR/PLS OXIMTRY CONT: CPT

## 2022-09-03 PROCEDURE — 85025 COMPLETE CBC W/AUTO DIFF WBC: CPT | Performed by: INTERNAL MEDICINE

## 2022-09-03 PROCEDURE — 94799 UNLISTED PULMONARY SVC/PX: CPT

## 2022-09-03 PROCEDURE — 93005 ELECTROCARDIOGRAM TRACING: CPT | Performed by: INTERNAL MEDICINE

## 2022-09-03 PROCEDURE — 83735 ASSAY OF MAGNESIUM: CPT | Performed by: INTERNAL MEDICINE

## 2022-09-03 PROCEDURE — C9399 UNCLASSIFIED DRUGS OR BIOLOG: HCPCS | Performed by: INTERNAL MEDICINE

## 2022-09-03 PROCEDURE — 93010 ELECTROCARDIOGRAM REPORT: CPT | Performed by: INTERNAL MEDICINE

## 2022-09-03 PROCEDURE — 99232 SBSQ HOSP IP/OBS MODERATE 35: CPT | Performed by: INTERNAL MEDICINE

## 2022-09-03 PROCEDURE — 25010000002 FUROSEMIDE PER 20 MG: Performed by: INTERNAL MEDICINE

## 2022-09-03 PROCEDURE — 84100 ASSAY OF PHOSPHORUS: CPT | Performed by: INTERNAL MEDICINE

## 2022-09-03 PROCEDURE — 80048 BASIC METABOLIC PNL TOTAL CA: CPT | Performed by: INTERNAL MEDICINE

## 2022-09-03 PROCEDURE — 82962 GLUCOSE BLOOD TEST: CPT

## 2022-09-03 PROCEDURE — 94760 N-INVAS EAR/PLS OXIMETRY 1: CPT

## 2022-09-03 PROCEDURE — 99222 1ST HOSP IP/OBS MODERATE 55: CPT | Performed by: INTERNAL MEDICINE

## 2022-09-03 RX ORDER — METOPROLOL SUCCINATE 25 MG/1
25 TABLET, EXTENDED RELEASE ORAL DAILY
Status: DISCONTINUED | OUTPATIENT
Start: 2022-09-04 | End: 2022-09-04 | Stop reason: HOSPADM

## 2022-09-03 RX ORDER — FUROSEMIDE 40 MG/1
40 TABLET ORAL DAILY
Status: DISCONTINUED | OUTPATIENT
Start: 2022-09-04 | End: 2022-09-04 | Stop reason: HOSPADM

## 2022-09-03 RX ADMIN — METOPROLOL SUCCINATE 25 MG: 25 TABLET, EXTENDED RELEASE ORAL at 08:51

## 2022-09-03 RX ADMIN — CLOPIDOGREL 75 MG: 75 TABLET, FILM COATED ORAL at 08:51

## 2022-09-03 RX ADMIN — RUXOLITINIB 15 MG: 15 TABLET ORAL at 20:11

## 2022-09-03 RX ADMIN — EMPAGLIFLOZIN 10 MG: 10 TABLET, FILM COATED ORAL at 17:11

## 2022-09-03 RX ADMIN — RUXOLITINIB 15 MG: 15 TABLET ORAL at 08:52

## 2022-09-03 RX ADMIN — SERTRALINE HYDROCHLORIDE 50 MG: 50 TABLET, FILM COATED ORAL at 08:51

## 2022-09-03 RX ADMIN — PANTOPRAZOLE SODIUM 40 MG: 40 TABLET, DELAYED RELEASE ORAL at 08:51

## 2022-09-03 RX ADMIN — APIXABAN 5 MG: 5 TABLET, FILM COATED ORAL at 08:51

## 2022-09-03 RX ADMIN — FUROSEMIDE 40 MG: 10 INJECTION, SOLUTION INTRAMUSCULAR; INTRAVENOUS at 08:51

## 2022-09-03 RX ADMIN — ATORVASTATIN CALCIUM 80 MG: 80 TABLET, FILM COATED ORAL at 20:11

## 2022-09-03 RX ADMIN — APIXABAN 5 MG: 5 TABLET, FILM COATED ORAL at 20:11

## 2022-09-03 RX ADMIN — Medication 10 ML: at 08:51

## 2022-09-03 NOTE — PLAN OF CARE
Goal Outcome Evaluation:      Diuretic in Use: lasix  Response to Diuretics (Output greater than intake): good  Daily Weight (up or down): down  O2 Requirements: o2 at night  Functional Status (Activity level, tolerance and respiratory symptoms): x1 walker  Discharge Plans: dc to [facility] living

## 2022-09-03 NOTE — PROGRESS NOTES
Enter Query Response Below      Query Response:     history of transient ischemic attack          If applicable, please update the problem list.     Patient: Catherine Boyer        : 1940  Account: [id number]           Admit Date: 2022        Options to Respond to Query:    1. Access the Encounter     a. From the To-Do Side bar, click Respond With Note.     b. Click New Note     c. Answer query within the yellow box.                d. Update the Problem List if applicable.     Dr. Muñoz:     Patient admitted with acute on chronic diastolic heart failure. H&P and physician progress notes listed transient ischemic attack as a diagnosis under active hospital problems. Cardiologist noted that patient had a history of transient ischemic attack on the cardiology consultation note. There has been no brain or head imaging thus far. Patient was continued on Eliquis for history of paroxysmal atrial fibrillation.     After study, can you further clarify the transient ischemic attack as:    History of transient ischemic attack   Transient ischemic attack ruled in   Other (please specify)__________________  Clinically indeterminable     By submitting this query, we are merely seeking further clarification of documentation to accurately reflect all conditions that you are monitoring, evaluating, treating or that extend the hospitalization or utilize additional resources of care. Please utilize your independent clinical judgment when addressing the question(s) above.     This query and your response, once completed, will be entered into the legal medical record.    Sincerely,  Tono MORGAN RN, CCDS  Clinical Documentation Integrity Program   [email or web address]

## 2022-09-03 NOTE — PLAN OF CARE
Goal Outcome Evaluation:      On strict I/O, rt radial cath site no hematoma noted. For overnight oximetry tonight vitals stable will cont to monitor

## 2022-09-03 NOTE — PROGRESS NOTES
"    Name: Catherine Boyer ADMIT: 2022   : 1940  PCP: Felxi Su MD    MRN: [id number] LOS: 2 days   AGE/SEX: 82 y.o. female  ROOM:      Subjective   Subjective   Patient had stent placed yesterday.  Denies any chest pain.  Still endorses a lot of \"tiredness\".    Review of Systems    Neg for CP, N/V/D  +fatigue      Objective   Objective   Vital Signs  Temp:  [97.5 °F (36.4 °C)-98.4 °F (36.9 °C)] 98 °F (36.7 °C)  Heart Rate:  [66-92] 68  Resp:  [10-26] 17  BP: (102-130)/(50-69) 121/60  SpO2:  [89 %-98 %] 89 %  on  Flow (L/min):  [2-3] 2;   Device (Oxygen Therapy): nasal cannula  Body mass index is 29.1 kg/m².  Physical Exam  Constitutional:       Appearance: Normal appearance.   HENT:      Mouth/Throat:      Pharynx: No oropharyngeal exudate or posterior oropharyngeal erythema.   Cardiovascular:      Rate and Rhythm: Normal rate and regular rhythm.      Heart sounds: No murmur heard.    No gallop.   Pulmonary:      Effort: Pulmonary effort is normal. No respiratory distress.      Breath sounds: No wheezing.   Abdominal:      General: Abdomen is flat. There is no distension.      Palpations: Abdomen is soft.      Tenderness: There is no abdominal tenderness.   Neurological:      General: No focal deficit present.      Mental Status: She is alert and oriented to person, place, and time.         Results Review     I reviewed the patient's new clinical results.  Results from last 7 days   Lab Units 22  0822  2131   WBC 10*3/mm3 26.59* 29.03* 29.57* 24.36*   HEMOGLOBIN g/dL 8.9* 8.5* 9.1* 9.3*   PLATELETS 10*3/mm3 216 224 238 249     Results from last 7 days   Lab Units 22  0825 22  0622  2131   SODIUM mmol/L 139 141 142 137   POTASSIUM mmol/L 4.2 4.8 4.2 4.5   CHLORIDE mmol/L 103 105 105 104   CO2 mmol/L 23.5 23.6 24.2 20.6*   BUN mg/dL 34* 36* 33* 34*   CREATININE mg/dL 0.98 1.06* 0.96 1.17*   GLUCOSE mg/dL 91 118* " 113* 204*   EGFR mL/min/1.73 57.0* 51.9* 58.5* 46.1*     Results from last 7 days   Lab Units 08/31/22  2131   ALBUMIN g/dL 4.50   BILIRUBIN mg/dL 0.4   ALK PHOS U/L 91   AST (SGOT) U/L 16   ALT (SGPT) U/L 28     Results from last 7 days   Lab Units 09/03/22  0313 09/02/22  0825 09/01/22  0617 08/31/22  2131   CALCIUM mg/dL 8.7 8.9 9.1 9.2   ALBUMIN g/dL  --   --   --  4.50   MAGNESIUM mg/dL 1.8 1.8 1.7  --    PHOSPHORUS mg/dL 4.7* 4.0  --   --      Results from last 7 days   Lab Units 09/02/22  0825   PROCALCITONIN ng/mL 0.15     Hemoglobin A1C   Date/Time Value Ref Range Status   09/01/2022 0616 6.60 (H) 4.80 - 5.60 % Final     Glucose   Date/Time Value Ref Range Status   09/03/2022 1027 152 (H) 70 - 130 mg/dL Final     Comment:     Meter: [id number] : [id number] Geoffrey Meg SHEREE   09/03/2022 0555 107 70 - 130 mg/dL Final     Comment:     Meter: [id number] : [id number] Nino BENTLEY   09/02/2022 2037 186 (H) 70 - 130 mg/dL Final     Comment:     Meter: [id number] : [id number] Nino BENTLEY   09/02/2022 1421 114 70 - 130 mg/dL Final     Comment:     Meter: [id number] : [id number] Dania BENTLEY   09/02/2022 0644 115 70 - 130 mg/dL Final     Comment:     Meter: [id number] : [id number] Marciano BENTLEY   09/01/2022 2049 132 (H) 70 - 130 mg/dL Final     Comment:     Meter: [id number] : [id number] Marciano BETNLEY   09/01/2022 1605 161 (H) 70 - 130 mg/dL Final     Comment:     Meter: [id number] : [id number] Bakari BENTLEY       No radiology results for the last day  Scheduled Medications  apixaban, 5 mg, Oral, Q12H  atorvastatin, 80 mg, Oral, Nightly  clopidogrel, 75 mg, Oral, Daily  empagliflozin, 10 mg, Oral, Daily  [START ON 9/4/2022] furosemide, 40 mg, Oral, Daily  insulin lispro, 0-9 Units, Subcutaneous, TID With Meals  [START ON 9/4/2022] metoprolol succinate XL, 25 mg, Oral, Daily  pantoprazole, 40 mg, Oral, Daily  ruxolitinib, 15 mg, Oral, BID  sertraline, 50 mg, Oral,  Daily    Infusions   Diet  Diet Regular; Cardiac       Assessment/Plan     Active Hospital Problems    Diagnosis  POA   • **Acute on chronic diastolic heart failure (HCC) [I50.33]  Yes   • Chest pain with high risk for cardiac etiology [R07.9]  Yes   • GERD (gastroesophageal reflux disease) [K21.9]  Yes   • Hypertension [I10]  Yes   • TIA (transient ischemic attack) [G45.9]  Yes   • CAD (coronary artery disease) [I25.10]  Yes   • 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       82 y.o. female admitted with Acute on chronic diastolic heart failure (HCC).      Acute on chronic diastolic heart failure  Acute hypoxic respiratory failure  -proBNP 2500 OA  -TTE (9/1/22): EF 57%   -Cardiology following  -Switched to p.o. Lasix 9/3/2022    CAD  -s/p 2 LEFTY 2/2022  -Troponin negative, EKG without acute ischemic changes  -SPECT 9/2/22 positive -> s/p LEFTY to diagonal a. 9/2/22  -Continue Plavix, atorvastatin 80mg  -No ASA given apixaban    Myeloproliferative disorder  Anemia, macrocytic  Leukocytosis  -On ruxolitinib 15 mg twice daily  -WBC increasing from last OP appointment  -Hematology following- peripheral flow pending; considering marrow biopsy in setting of likely insufficient response to therapy    Paroxysmal Afib  -RC: Metoprolol 25 mg  -AC: Apixaban    DM2  -Last A1c 6.6 (9/1/22)  -SSI  -Monitor blood glucose closely    HTN  -Holding home lisinopril 10mg given softer BPs  -metoprolol 25mg      · Eliquis (home med) for DVT prophylaxis.  · DNR.  · Discussed with patient.  · Anticipate discharge home with HH vs SNU facility when cleared by consultants.      Anthony Muñoz MD  [facility]  09/03/22  11:35 EDT

## 2022-09-03 NOTE — PROGRESS NOTES
"CC: Coronary artery disease    Interval History: No acute events overnight      Vital Signs  Temp:  [97.5 °F (36.4 °C)-98.4 °F (36.9 °C)] 98 °F (36.7 °C)  Heart Rate:  [66-92] 68  Resp:  [10-26] 17  BP: (102-130)/(50-69) 121/60    Intake/Output Summary (Last 24 hours) at 9/3/2022 1202  Last data filed at 9/3/2022 0830  Gross per 24 hour   Intake 590 ml   Output --   Net 590 ml     Flowsheet Rows    Flowsheet Row First Filed Value   Admission Height 154.9 cm (61\") Documented at 09/01/2022 0249   Admission Weight 71.9 kg (158 lb 8.2 oz) Documented at 09/01/2022 0249          PHYSICAL EXAM:  General: No acute distress  Resp:NL Rate, symmetric chest expansion,unlabored, clear  CV:NL rate and rhythm, NL PMI, NL S1 and S2, no Murmur, no gallop, no rub, No JVD.   ABD:Nl sounds, no masses or tenderness, nondistended, no guarding or rebound  Neuro: alert,cooperative and oriented  Extr:Normal pedal pulses, No edema or cyanosis, moves all extremities      Results Review:    Results from last 7 days   Lab Units 09/03/22  0313   SODIUM mmol/L 139   POTASSIUM mmol/L 4.2   CHLORIDE mmol/L 103   CO2 mmol/L 23.5   BUN mg/dL 34*   CREATININE mg/dL 0.98   GLUCOSE mg/dL 91   CALCIUM mg/dL 8.7     Results from last 7 days   Lab Units 09/01/22  0617 08/31/22  2131   TROPONIN T ng/mL 0.030 0.010     Results from last 7 days   Lab Units 09/03/22  0313   WBC 10*3/mm3 26.59*   HEMOGLOBIN g/dL 8.9*   HEMATOCRIT % 25.6*   PLATELETS 10*3/mm3 216         Results from last 7 days   Lab Units 09/01/22  0617   CHOLESTEROL mg/dL 112     Results from last 7 days   Lab Units 09/03/22  0313   MAGNESIUM mg/dL 1.8     Results from last 7 days   Lab Units 09/01/22  0617   CHOLESTEROL mg/dL 112   TRIGLYCERIDES mg/dL 157*   HDL CHOL mg/dL 37*   LDL CHOL mg/dL 48     I reviewed the patient's new clinical results.  I personally viewed and interpreted the patient's EKG/Telemetry data        Medication Review:   Meds reviewed         Assessment/Plan    1.  " Coronary artery disease with chest pain and abnormal myocardial perfusion study involving inferolateral wall.  Negative troponin.  Status post successful intervention with LEFTY of of a large diagonal branch in-stent restenosis.  She has  of mid RCA and nonobstructive disease of LAD.  No stent noted in the LAD.  No recurrence of chest  Present low ventricular ejection fraction.  Continue Plavix, Eliquis, high intensity statin.  She has severe reaction to aspirin in the past  Recommend PT/OT ambulation with    2.  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  History of diastolic CHF-euvolemic on exam  Restart oral Lasix tomorrow's blood pressure is soft    4.  Hypotension- metoprolol and lisinopril held.  BP normal today.  Resume metoprolol 25 mg once daily    5.  Myeloproliferative disorder and macrocytic anemia-hematology follow        Billy Michel MD  09/03/22  12:02 EDT

## 2022-09-03 NOTE — CONSULTS
.     REASON FOR CONSULTATION:     Provide an opinion on any further workup or treatment  Myeloproliferative disorder/primary myelofibrosis  Leukocytosis                             REQUESTING PHYSICIAN: Oseas Song MD  RECORDS OBTAINED:  Records of the patient's history including those obtained from the referring provider were reviewed and summarized in detail.    HISTORY OF PRESENT ILLNESS:  The patient is a 82 y.o. year old female  who is here for follow-up with the above-mentioned history.  Patient is a 82-year-old lady with JAK2 positive myeloproliferative disorder/myelofibrosis.  She was diagnosed in the summer 2021 when she had a bone marrow biopsy performed at [facility] which showed hypercellular marrow with greater than 95% cellularity, 1% blasts, 1+ reticulin fibrosis.  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  PCR for BCR/ABL was negative.  JAK2 V6 17F PCR was positive.  Cytogenetics were normal.Next generation myeloid disorder profiling of the bone marrow aspirate from 6/30/2021 showed TET2 A1189D and [id number]*47 abnormalities and JAK2 V617F.  Therefore she was suspected to have early primary myelofibrosis and she was started on hydroxyurea 1000 mg daily.   In February 2022 she had a left heart catheterization and a drug-eluting stent was placed on 2/11/2022.  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  History of atrial fibrillation on Eliquis.  She was previously being managed at [place] by Dr. Gerard Tariq.  Hydrea was discontinued and patient was started on Rixulotinib 15 mg twice daily on 3/14/2022.  Recently it was noted that her WBC count was increasing.  On 5/26/2022 WBC count was 12.4.  On 6/30/2022 WBC count further elevated at 18.2.  She was admitted to the hospital on 8/31/2022 and on presentation WBC count 24.36 and had stayed between 24-29,000.  Hemoglobin at baseline between 8 and 9.  Platelet counts relatively  normal    Other comorbidities include coronary artery disease, diastolic heart failure, GERD, paroxysmal atrial fibrillation on Eliquis, hypertension, porcelain gallbladder, history of nonbacterial endocarditis, type 2 diabetes she was admitted to the hospital for complaints of chest pressure radiating to her shoulders bilaterally.  Chest x-ray on arrival showed pulmonary edema.  Troponins were negative and BNP was noted to be elevated.  Echocardiogram 9/1/2022 showed normal EF of 65% but apical hypokinesis.  9/2/2022-myocardial perfusion images indicate a large sized infarct located in the anterior wall and apex with mild to moderate pericardial ischemia.  An additional medium sized mild to moderate severe area of ischemia located in the inferior wall was noted.  Calculated ejection fraction 27%.  Impressions are consistent with high risk study.    9/2/2022-cardiac catheterization was performed.  Severe multivessel coronary artery disease with 99% in-stent restenosis of the diagonal stent, 60% proximal to mid LAD segment stenosis, 100% occlusion of the mid right coronary artery with left-to-right collaterals filling the distal vascular bed and luminal irregularities throughout the circumflex territory.  Revascularization of the in-stent restenosis was performed with placement of the drug-eluting stent.  She was loaded with Plavix and continued on Eliquis.  Medical management of LAD plan    We have been consulted for management of the worsening leukocytosis in the setting of known myeloproliferative disease      Past Medical History:   Diagnosis Date   • Atrial fibrillation (HCC)    • Breast cancer (HCC)    • CAD (coronary artery disease)     NSTEMI 2/2022: 90% ostial LAD, 99% D1, 70% mid-distal LAD (medical therapy). She received two stents (2.5x18 and 2.5x26mm Finesse LEFTY) but I don't know which one went to which lesion.   • Carotid atherosclerosis    • Chronic diastolic (congestive) heart failure (HCC)    • GERD  (gastroesophageal reflux disease)    • Glaucoma    • History of cataract    • Hypertension    • Microcytic anemia     per Dr. oleg pate office  note 6/30/22-dd   • Myeloproliferative disorder (HCC)     JAK2 positive   • Nonbacterial thrombotic endocarditis     6/2021: 4x5mm vegetation on the ventricular surface of the anterior MV, negative blood cultures   • Porcelain gallbladder    • PUD (peptic ulcer disease)    • TIA (transient ischemic attack)    • Type 2 diabetes mellitus (HCC)    • Upper GI bleed      Past Surgical History:   Procedure Laterality Date   • BREAST LUMPECTOMY  1999   • CATARACT EXTRACTION  2011       MEDICATIONS    Current Facility-Administered Medications:   •  apixaban (ELIQUIS) tablet 5 mg, 5 mg, Oral, Q12H, Guero Verde MD  •  atorvastatin (LIPITOR) tablet 80 mg, 80 mg, Oral, Nightly, Guero Verde MD, 80 mg at 09/02/22 2009  •  clopidogrel (PLAVIX) tablet 75 mg, 75 mg, Oral, Daily, Guero Verde MD, 75 mg at 09/01/22 1316  •  dextrose (D50W) (25 g/50 mL) IV injection 25 g, 25 g, Intravenous, Q15 Min PRN, Guero Verde MD  •  dextrose (GLUTOSE) oral gel 15 g, 15 g, Oral, Q15 Min PRN, Guero Verde MD  •  empagliflozin (JARDIANCE) tablet 10 mg, 10 mg, Oral, Daily, Guero Verde MD  •  furosemide (LASIX) injection 40 mg, 40 mg, Intravenous, Q12H, Guero Verde MD, 40 mg at 09/02/22 2008  •  glucagon (human recombinant) (GLUCAGEN DIAGNOSTIC) injection 1 mg, 1 mg, Intramuscular, Q15 Min PRN, Guero Verde MD  •  insulin lispro (ADMELOG) injection 0-9 Units, 0-9 Units, Subcutaneous, TID With Meals, Guero Verde MD, 2 Units at 09/01/22 1841  •  metoprolol succinate XL (TOPROL-XL) 24 hr tablet 25 mg, 25 mg, Oral, BID, Guero Verde MD, 25 mg at 09/02/22 2009  •  pantoprazole (PROTONIX) EC tablet 40 mg, 40 mg, Oral, Daily, Guero Verde MD, 40 mg at 09/01/22 1316  •  ruxolitinib (JAKAFI) chemo tablet 15 mg, 15 mg, Oral, BID, Guero Verde  "MD SIGRID, 15 mg at 09/02/22 2010  •  sertraline (ZOLOFT) tablet 50 mg, 50 mg, Oral, Daily, Guero Verde MD, 50 mg at 09/01/22 1316  •  [COMPLETED] Insert peripheral IV, , , Once **AND** sodium chloride 0.9 % flush 10 mL, 10 mL, Intravenous, PRN, Guero Verde MD, 10 mL at 09/01/22 2029    ALLERGIES:     Allergies   Allergen Reactions   • Aspirin Unknown - Low Severity   • Oxycodone Unknown - Low Severity       SOCIAL HISTORY:       Social History     Socioeconomic History   • Marital status:    Tobacco Use   • Smoking status: Former Smoker     Packs/day: 1.00     Years: 20.00     Pack years: 20.00   • Smokeless tobacco: Never Used   • Tobacco comment: 30  years ago   Substance and Sexual Activity   • Alcohol use: Yes     Comment: \"rare\"   • Drug use: Never         FAMILY HISTORY:  Family History   Problem Relation Age of Onset   • Ovarian cancer Mother    • Lung cancer Father    • Lung cancer Sister        REVIEW OF SYSTEMS:  Review of Systems   Constitutional: Positive for activity change and fatigue.   HENT: Negative.    Eyes: Negative.    Respiratory: Positive for shortness of breath.    Cardiovascular: Positive for chest pain.   Gastrointestinal: Negative.    Endocrine: Negative.    Genitourinary: Negative.    Musculoskeletal: Negative.    Skin: Negative.    Allergic/Immunologic: Negative.    Neurological: Negative.    Hematological: Negative.    Psychiatric/Behavioral: Negative.               Vitals:    09/02/22 1900 09/02/22 2327 09/03/22 0337 09/03/22 0642   BP: 109/50 102/61 121/57    BP Location: Left arm Left arm Left arm    Patient Position: Lying Lying Lying    Pulse: 92 66 74    Resp: 17 17 17    Temp: 98.1 °F (36.7 °C) 98.4 °F (36.9 °C) 97.5 °F (36.4 °C)    TempSrc: Oral Oral Oral    SpO2:       Weight:    69.9 kg (154 lb)   Height:         Current Status 6/30/2022   ECOG score 1      PHYSICAL EXAM:      CONSTITUTIONAL:  Vital signs reviewed.  No distress, looks comfortable.  EYES:  " Conjunctivae and lids unremarkable.  PERRLA  EARS,NOSE,MOUTH,THROAT:  Ears and nose appear unremarkable.  Lips, teeth, gums appear unremarkable.  RESPIRATORY:  Normal respiratory effort.  Lungs clear to auscultation bilaterally.  CARDIOVASCULAR:  Normal S1, S2.  No murmurs rubs or gallops.  No significant lower extremity edema.  GASTROINTESTINAL: Abdomen appears unremarkable.  Nontender.  No hepatomegaly.  No splenomegaly.  LYMPHATIC:  No cervical, supraclavicular, axillary lymphadenopathy.  MUSCULOSKELETAL:  Unremarkable gait and station.  Unremarkable digits/nails.  No cyanosis or clubbing.  SKIN:  Warm.  No rashes.  PSYCHIATRIC:  Normal judgment and insight.  Normal mood and affect.      RECENT LABS:        WBC   Date Value Ref Range Status   09/03/2022 26.59 (H) 3.40 - 10.80 10*3/mm3 Final   09/02/2022 29.03 (H) 3.40 - 10.80 10*3/mm3 Final   09/01/2022 29.57 (H) 3.40 - 10.80 10*3/mm3 Final   08/31/2022 24.36 (H) 3.40 - 10.80 10*3/mm3 Final     Hemoglobin   Date Value Ref Range Status   09/03/2022 8.9 (L) 12.0 - 15.9 g/dL Final   09/02/2022 8.5 (L) 12.0 - 15.9 g/dL Final   09/01/2022 9.1 (L) 12.0 - 15.9 g/dL Final   08/31/2022 9.3 (L) 12.0 - 15.9 g/dL Final     Platelets   Date Value Ref Range Status   09/03/2022 216 140 - 450 10*3/mm3 Final   09/02/2022 224 140 - 450 10*3/mm3 Final   09/01/2022 238 140 - 450 10*3/mm3 Final   08/31/2022 249 140 - 450 10*3/mm3 Final       Assessment & Plan   [unfilled]      Catherine Boyer     *JAK2 positive myeloproliferative disorder/myelofibrosis.  · diagnosed in the summer 2021 when she had a bone marrow biopsy performed at [facility] which showed hypercellular marrow with greater than 95% cellularity, 1% blasts, 1+ reticulin fibrosis.  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  PCR for BCR/ABL was negative.  JAK2 V6 17F PCR was positive.  Cytogenetics were normal.Next generation myeloid disorder profiling of the bone marrow aspirate from 6/30/2021  showed TET2 H0852I and [id number]*47 abnormalities and JAK2 V617F.  Therefore she was suspected to have early primary myelofibrosis and she was started on hydroxyurea 1000 mg daily.   · She was previously being managed at [place] by Dr. Gerard Tariq.  Hydrea was discontinued and patient was started on Rixulotinib 15 mg twice daily on 3/14/2022.  Recently it was noted that her WBC count was increasing.   · On 5/26/2022 WBC count was 12.4.  On 6/30/2022 WBC count further elevated at 18.2.  · She was admitted to the hospital on 8/31/2022 and on presentation WBC count 24.36 and had stayed between 24-29,000.  Hemoglobin at baseline between 8 and 9.  Platelet counts relatively normal  · 3/19/2022-CT of the chest abdomen and pelvis showed borderline mediastinal and right hilar lymph nodes which are nonspecific.  No splenomegaly  · But increasing WBC count is definitely concerning for an inadequate response to Rixulotinib   · We will obtain a flow cytometry to assess the presence of any peripheral blasts  · She may ultimately need a repeat bone marrow biopsy and repeat cytogenetics if WBC continues to increase     *Leukocytosis  · Likely secondary to inadequate response to Rixulotinib   · But WBC high in the past   · We will wait for her to recover from acute event and recheck CBC as OP and determine need for BMB       *Anemia  · Hemoglobin 8.9  · Likely secondary to underlying myeloproliferative disease  · Has been stable compared to past 12  · Recent anemia work-up shows normal iron studies, B12 and folic acid    *Marantic endocarditis and atrial fibrillation  · Continue Eliquis  · Management per cardiology    *Coronary artery disease   · Echocardiogram 9/1/2022 showed normal EF of 65% but apical hypokinesis.  · 9/2/2022-myocardial perfusion images indicate a large sized infarct located in the anterior wall and apex with mild to moderate pericardial ischemia.  An additional medium sized mild to moderate severe area of  ischemia located in the inferior wall was noted.  Calculated ejection fraction 27%.  Impressions are consistent with high risk study.  · 9/2/2022-cardiac catheterization was performed.  Severe multivessel coronary artery disease with 99% in-stent restenosis of the diagonal stent, 60% proximal to mid LAD segment stenosis, 100% occlusion of the mid right coronary artery with left-to-right collaterals filling the distal vascular bed and luminal irregularities throughout the circumflex territory.  · Revascularization of the in-stent restenosis was performed with placement of the drug-eluting stent.  · She was loaded with Plavix and continued on Eliquis.  Medical management of LAD plan  · Management per cardiology     Recommendations   · Obtain flow cytometry  · May need a bone marrow biopsy eventually after flow cytometry resulted  · Continue anticoagulation     She can be discharged from our standpoint to follow up with  in clinic.   We will sign off at  this time.

## 2022-09-03 NOTE — PLAN OF CARE
Goal Outcome Evaluation:  Plan of Care Reviewed With: patient           Outcome Evaluation: patient denies chest pain nor discomfort, slept well last night ,TR band removed no bleeding noted ,punctured site dry and intact, vital signs stable, SR on the monitor, cont to monitor

## 2022-09-04 ENCOUNTER — READMISSION MANAGEMENT (OUTPATIENT)
Dept: CALL CENTER | Facility: HOSPITAL | Age: 82
End: 2022-09-04

## 2022-09-04 VITALS
BODY MASS INDEX: 28.32 KG/M2 | HEIGHT: 61 IN | SYSTOLIC BLOOD PRESSURE: 124 MMHG | WEIGHT: 150 LBS | HEART RATE: 96 BPM | OXYGEN SATURATION: 97 % | TEMPERATURE: 98 F | DIASTOLIC BLOOD PRESSURE: 64 MMHG | RESPIRATION RATE: 16 BRPM

## 2022-09-04 PROBLEM — Z86.73 PERSONAL HISTORY OF TRANSIENT ISCHEMIC ATTACK (TIA), AND CEREBRAL INFARCTION WITHOUT RESIDUAL DEFICITS: Status: ACTIVE | Noted: 2022-09-04

## 2022-09-04 PROBLEM — I50.33 ACUTE ON CHRONIC DIASTOLIC HEART FAILURE: Status: RESOLVED | Noted: 2022-07-14 | Resolved: 2022-09-04

## 2022-09-04 PROBLEM — R07.9 CHEST PAIN WITH HIGH RISK FOR CARDIAC ETIOLOGY: Status: RESOLVED | Noted: 2022-08-31 | Resolved: 2022-09-04

## 2022-09-04 LAB
ANION GAP SERPL CALCULATED.3IONS-SCNC: 13.7 MMOL/L (ref 5–15)
ANISOCYTOSIS BLD QL: ABNORMAL
BASOPHILS # BLD MANUAL: 0.21 10*3/MM3 (ref 0–0.2)
BASOPHILS NFR BLD MANUAL: 1 % (ref 0–1.5)
BUN SERPL-MCNC: 33 MG/DL (ref 8–23)
BUN/CREAT SERPL: 34.7 (ref 7–25)
CALCIUM SPEC-SCNC: 8.9 MG/DL (ref 8.6–10.5)
CHLORIDE SERPL-SCNC: 103 MMOL/L (ref 98–107)
CO2 SERPL-SCNC: 24.3 MMOL/L (ref 22–29)
CREAT SERPL-MCNC: 0.95 MG/DL (ref 0.57–1)
DEPRECATED RDW RBC AUTO: 61.7 FL (ref 37–54)
EGFRCR SERPLBLD CKD-EPI 2021: 59.2 ML/MIN/1.73
EOSINOPHIL # BLD MANUAL: 0.42 10*3/MM3 (ref 0–0.4)
EOSINOPHIL NFR BLD MANUAL: 2 % (ref 0.3–6.2)
ERYTHROCYTE [DISTWIDTH] IN BLOOD BY AUTOMATED COUNT: 16.6 % (ref 12.3–15.4)
GLUCOSE BLDC GLUCOMTR-MCNC: 110 MG/DL (ref 70–130)
GLUCOSE BLDC GLUCOMTR-MCNC: 126 MG/DL (ref 70–130)
GLUCOSE SERPL-MCNC: 104 MG/DL (ref 65–99)
HCT VFR BLD AUTO: 27.3 % (ref 34–46.6)
HGB BLD-MCNC: 9.2 G/DL (ref 12–15.9)
LYMPHOCYTES # BLD MANUAL: 3.14 10*3/MM3 (ref 0.7–3.1)
LYMPHOCYTES NFR BLD MANUAL: 8 % (ref 5–12)
MACROCYTES BLD QL SMEAR: ABNORMAL
MAGNESIUM SERPL-MCNC: 1.9 MG/DL (ref 1.6–2.4)
MCH RBC QN AUTO: 34.3 PG (ref 26.6–33)
MCHC RBC AUTO-ENTMCNC: 33.7 G/DL (ref 31.5–35.7)
MCV RBC AUTO: 101.9 FL (ref 79–97)
MONOCYTES # BLD: 1.68 10*3/MM3 (ref 0.1–0.9)
NEUTROPHILS # BLD AUTO: 15.51 10*3/MM3 (ref 1.7–7)
NEUTROPHILS NFR BLD MANUAL: 74 % (ref 42.7–76)
NRBC BLD AUTO-RTO: 0.1 /100 WBC (ref 0–0.2)
PHOSPHATE SERPL-MCNC: 3.7 MG/DL (ref 2.5–4.5)
PLAT MORPH BLD: NORMAL
PLATELET # BLD AUTO: 240 10*3/MM3 (ref 140–450)
PMV BLD AUTO: 11.4 FL (ref 6–12)
POIKILOCYTOSIS BLD QL SMEAR: ABNORMAL
POLYCHROMASIA BLD QL SMEAR: ABNORMAL
POTASSIUM SERPL-SCNC: 4.4 MMOL/L (ref 3.5–5.2)
RBC # BLD AUTO: 2.68 10*6/MM3 (ref 3.77–5.28)
SODIUM SERPL-SCNC: 141 MMOL/L (ref 136–145)
VARIANT LYMPHS NFR BLD MANUAL: 15 % (ref 19.6–45.3)
WBC MORPH BLD: NORMAL
WBC NRBC COR # BLD: 20.96 10*3/MM3 (ref 3.4–10.8)

## 2022-09-04 PROCEDURE — 85007 BL SMEAR W/DIFF WBC COUNT: CPT | Performed by: INTERNAL MEDICINE

## 2022-09-04 PROCEDURE — 82962 GLUCOSE BLOOD TEST: CPT

## 2022-09-04 PROCEDURE — 80048 BASIC METABOLIC PNL TOTAL CA: CPT | Performed by: INTERNAL MEDICINE

## 2022-09-04 PROCEDURE — 83735 ASSAY OF MAGNESIUM: CPT | Performed by: INTERNAL MEDICINE

## 2022-09-04 PROCEDURE — 63710000001 RUXOLITINIB 15 MG TABLET: Performed by: INTERNAL MEDICINE

## 2022-09-04 PROCEDURE — 85025 COMPLETE CBC W/AUTO DIFF WBC: CPT | Performed by: INTERNAL MEDICINE

## 2022-09-04 PROCEDURE — 84100 ASSAY OF PHOSPHORUS: CPT | Performed by: INTERNAL MEDICINE

## 2022-09-04 PROCEDURE — 99232 SBSQ HOSP IP/OBS MODERATE 35: CPT | Performed by: INTERNAL MEDICINE

## 2022-09-04 PROCEDURE — C9399 UNCLASSIFIED DRUGS OR BIOLOG: HCPCS | Performed by: INTERNAL MEDICINE

## 2022-09-04 RX ADMIN — PANTOPRAZOLE SODIUM 40 MG: 40 TABLET, DELAYED RELEASE ORAL at 08:52

## 2022-09-04 RX ADMIN — EMPAGLIFLOZIN 10 MG: 10 TABLET, FILM COATED ORAL at 08:57

## 2022-09-04 RX ADMIN — APIXABAN 5 MG: 5 TABLET, FILM COATED ORAL at 08:52

## 2022-09-04 RX ADMIN — FUROSEMIDE 40 MG: 40 TABLET ORAL at 08:52

## 2022-09-04 RX ADMIN — METOPROLOL SUCCINATE 25 MG: 25 TABLET, EXTENDED RELEASE ORAL at 08:52

## 2022-09-04 RX ADMIN — CLOPIDOGREL 75 MG: 75 TABLET, FILM COATED ORAL at 08:52

## 2022-09-04 RX ADMIN — SERTRALINE HYDROCHLORIDE 50 MG: 50 TABLET, FILM COATED ORAL at 08:52

## 2022-09-04 RX ADMIN — RUXOLITINIB 15 MG: 15 TABLET ORAL at 08:52

## 2022-09-04 NOTE — CASE MANAGEMENT/SOCIAL WORK
Case Management Discharge Note      Final Note: Return to [facility]. Required Home O2@2LNC at night. Overnight oximetry and O2 order in Epic. Called [facility] to supply home O2. [facility] accepted and will supply O2. Daughter is transporting.         Selected Continued Care - Admitted Since 8/31/2022     Destination Coordination complete.    Service Provider Selected Services Address Phone Fax Patient Preferred    [facility]  [facility] [address] [place] [place] [address] [phone] --          Durable Medical Equipment Coordination complete.    Service Provider Selected Services Address Phone Fax Patient Preferred    [facility]  Durable Medical Equipment [address], [place] [id number] [phone] --          Dialysis/Infusion    No services have been selected for the patient.              Home Medical Care    No services have been selected for the patient.              Therapy    No services have been selected for the patient.              Community Resources    No services have been selected for the patient.              Community & DME    No services have been selected for the patient.                Selected Continued Care - Episodes Includes selections from active Coordinated Care Management episodes    Oncology Episode start date: 5/18/2022   There are no active outsourced providers for this episode.               Transportation Services  Private: Car    Final Discharge Disposition Code: 01 - home or self-care

## 2022-09-04 NOTE — PLAN OF CARE
Goal Outcome Evaluation:  Plan of Care Reviewed With: patient           Outcome Evaluation: patient denies pain nor discomfort vital signs stable, pt on overnight pulse oximentry , SR on the monitor, cont on diureses

## 2022-09-04 NOTE — NURSING NOTE
Diuretic in Use: lasix  Response to Diuretics (Output greater than intake): yes    Daily Weight (up or down): down  O2 Requirements: room air  Functional Status (Activity level, tolerance and respiratory symptoms):   Discharge Plans: still on lasix, up with assist x1, strict intact and out put,  Cont to monitor

## 2022-09-04 NOTE — PAYOR COMM NOTE
"Maribell Boyer (82 y.o. Female)     PLEASE SEE ATTACHED DC SUMMARY    REF#[id number]    THANK YOU    BARBI CASTRO LPN CCP            Date of Birth   1940    Social Security Number       Address   [address] [place] [address] [address] [place] [address]    Home Phone   [phone]    MRN   [id number]       Denominational   Unknown    Marital Status                               Admission Date   22    Admission Type   Emergency    Admitting Provider   Oseas Song MD    Attending Provider       Department, Room/Bed   [address],        Discharge Date   2022    Discharge Disposition   Home or Self Care    Discharge Destination                               Attending Provider: (none)   Allergies: Aspirin, Oxycodone    Isolation: None   Infection: None   Code Status: CPR   Advance Care Planning Activity    Ht: 154.9 cm (61\")   Wt: 68 kg (150 lb)    Admission Cmt: None   Principal Problem: Acute on chronic diastolic heart failure (HCC) [I50.33]                 Active Insurance as of 2022     Primary Coverage     Payor Plan Insurance Group Employer/Plan Group    AETNA MEDICARE REPLACEMENT AETNA MEDICARE REPLACEMENT [id number]     Payor Plan Address Payor Plan Phone Number Payor Plan Fax Number Effective Dates    [address] [phone]  2022 - None Entered    [facility] [id number]       Subscriber Name Subscriber Birth Date Member ID       MARIBELL BOYER 1940 [id number]                 Emergency Contacts      (Rel.) Home Phone Work Phone Mobile Phone    RAY BOYER (Daughter) [phone] -- [phone]    Karen Gutierrez (Daughter) -- -- [phone]    MerlinGreg (Son) -- -- [phone]               Discharge Summary      Oseas Song MD at 22 1047              Patient Name: Maribell Boyer  : 1940  MRN: [id number]    Date of Admission: 2022  Date of Discharge:  2022  Primary Care Physician: " Felix Su MD      Chief Complaint:   Chest Pain      Discharge Diagnoses     Active Hospital Problems    Diagnosis  POA   • Personal history of transient ischemic attack (TIA), 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 deficits [Z86.73]  Not Applicable   • GERD (gastroesophageal reflux disease) [K21.9]  Yes   • Hypertension [I10]  Yes   • CAD (coronary artery disease) [I25.10]  Yes   • 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 Boyer is a 82 y.o. female with a history of DM2, coronary artery disease s/p LEFTY in February 2022, chronic diastolic heart failure, hypertension, chronic afib, JAVIER 2 positive myeloproliferative disorder on Jakafi, history of non-infectious myocarditis who presented to [facility] initially complaining of chest pain and shortness of breath.  Please see the admitting history and physical for further details.  She was found to have an acute on chronic diastolic heart failure exacerbation causing hypoxia and was admitted to the hospital for further evaluation and treatment.      She was started on iv diuretics and seen in consultation by cardiology. They performed a stress test which was positive prompting a cardiac catheterization wherein she received a drug eluting stent to a large diagonal branch for in stent stenosis. Her shortness of breath improved and she was euvolemic and so she was transitioned back to oral diuretics, though she continues to require oxygen at night. She has been instructed to obtain an outpatient sleep study, but she  will also continue on 2L night time oxygen going forward. She will need to follow up with cardiology within the next 2 weeks.    She was also seen this admission by hematology since her wbc was relatively elevated in comparison to prior. Flow cytometry was obtained and it was noted that a bone marrow biopsy may eventually be necessary depending on the results of flow cytometry (pending at the time of discharge) as she may be having an inadequate response to Jakafi. She will need to follow up with Dr. Reinoso in clinic.     Day of Discharge     Subjective:  No events overnight. She reports 2 episodes of diarrhea yesterday, but no abdominal pain, fevers, chills, or leukocytosis. She is anxious for discharge.    Physical Exam:  Temp:  [97.5 °F (36.4 °C)-98.1 °F (36.7 °C)] 97.5 °F (36.4 °C)  Heart Rate:  [] 96  Resp:  [16-18] 16  BP: ()/(56-64) 113/62  Body mass index is 28.34 kg/m².  Physical Exam  Constitutional:       General: She is not in acute distress.     Appearance: She is not toxic-appearing.   Cardiovascular:      Rate and Rhythm: Normal rate and regular rhythm.      Heart sounds: Normal heart sounds.   Pulmonary:      Effort: Pulmonary effort is normal.      Breath sounds: Normal breath sounds.   Abdominal:      General: Bowel sounds are normal.      Palpations: Abdomen is soft.   Musculoskeletal:         General: No tenderness.      Left lower leg: No edema.   Neurological:      Mental Status: She is alert.   Psychiatric:         Mood and Affect: Mood normal.         Behavior: Behavior normal.         Consultants     Consult Orders (all) (From admission, onward)     Start     Ordered    09/03/22 1133  Hematology & Oncology Inpatient Consult  Once        Specialty:  Hematology and Oncology  Provider:  Shanae Chinchilla MD    09/03/22 1132    09/02/22 1029  Hematology & Oncology Inpatient Consult  Once        Specialty:  Hematology and Oncology  Provider:  Darrell Reinoso MD    09/02/22 1033     08/31/22 2258  Consult to Case Management   Once        Provider:  (Not yet assigned)    08/31/22 2259 08/31/22 2258  Inpatient Consult to Cardiology  Once        Specialty:  Cardiology  Provider:  Albin Barriga MD    08/31/22 2259 08/31/22 2216  [facility] (on-call MD unless specified) Details  Once        Specialty:  Hospitalist  Provider:  (Not yet assigned)    08/31/22 2215              Procedures     Imaging Results (All)     Procedure Component Value Units Date/Time    XR Chest 1 View [[id number]] Collected: 08/31/22 2135     Updated: 08/31/22 2157    Narrative:      CHEST SINGLE VIEW     HISTORY: Chest pain     COMPARISON: None     FINDINGS: Heart size is borderline enlarged. There are increased  interstitial markings that are suspected to represent hydrostatic  interstitial pulmonary edema. No superimposed airspace disease is  evident. There is no pneumothorax. Thoracic aorta is tortuous and aortic  vascular calcifications are present. There is advanced osteoarthritis at  both shoulders. Several osteochondral bodies are present on the left at  the level of the proximal humeral metaphysis.       Impression:      Increased interstitial markings are predominantly perihilar  and suspected to represent hydrostatic interstitial pulmonary edema.  There are no previous studies for comparison.     This report was finalized on 8/31/2022 9:54 PM by Dr. Torey Bedoya M.D.             Pertinent Labs     Results from last 7 days   Lab Units 09/04/22 0316 09/03/22 0313 09/02/22 0825 09/01/22  0617   WBC 10*3/mm3 20.96* 26.59* 29.03* 29.57*   HEMOGLOBIN g/dL 9.2* 8.9* 8.5* 9.1*   PLATELETS 10*3/mm3 240 216 224 238     Results from last 7 days   Lab Units 09/04/22 0316 09/03/22 0313 09/02/22  0825 09/01/22  0617   SODIUM mmol/L 141 139 141 142   POTASSIUM mmol/L 4.4 4.2 4.8 4.2   CHLORIDE mmol/L 103 103 105 105   CO2 mmol/L 24.3 23.5 23.6 24.2   BUN mg/dL 33* 34* 36* 33*   CREATININE mg/dL 0.95 0.98  1.06* 0.96   GLUCOSE mg/dL 104* 91 118* 113*   Estimated Creatinine Clearance: 38.9 mL/min (by C-G formula based on SCr of 0.95 mg/dL).  Results from last 7 days   Lab Units 08/31/22  2131   ALBUMIN g/dL 4.50   BILIRUBIN mg/dL 0.4   ALK PHOS U/L 91   AST (SGOT) U/L 16   ALT (SGPT) U/L 28     Results from last 7 days   Lab Units 09/04/22  0316 09/03/22  0313 09/02/22  0825 09/01/22  0617 08/31/22  2131   CALCIUM mg/dL 8.9 8.7 8.9 9.1 9.2   ALBUMIN g/dL  --   --   --   --  4.50   MAGNESIUM mg/dL 1.9 1.8 1.8 1.7  --    PHOSPHORUS mg/dL 3.7 4.7* 4.0  --   --      Results from last 7 days   Lab Units 08/31/22  2131   LIPASE U/L 38     Results from last 7 days   Lab Units 09/01/22  0617 08/31/22  2131   TROPONIN T ng/mL 0.030 0.010   PROBNP pg/mL  --  2,589.0*       Results from last 7 days   Lab Units 09/01/22 0617   CHOLESTEROL mg/dL 112   TRIGLYCERIDES mg/dL 157*   HDL CHOL mg/dL 37*   LDL CHOL mg/dL 48           Test Results Pending at Discharge       Discharge Details        Discharge Medications      New Medications      Instructions Start Date   empagliflozin 10 MG tablet tablet  Commonly known as: JARDIANCE   10 mg, Oral, Daily         Continue These Medications      Instructions Start Date   acetaminophen 500 MG tablet  Commonly known as: TYLENOL   500 mg, Oral, Every 4 Hours PRN      apixaban 5 MG tablet tablet  Commonly known as: ELIQUIS   5 mg, Oral, Every 12 Hours Scheduled      atorvastatin 80 MG tablet  Commonly known as: LIPITOR   1 tablet, Oral, Nightly      bimatoprost 0.01 % ophthalmic drops  Commonly known as: LUMIGAN   1 drop, Ophthalmic      clopidogrel 75 MG tablet  Commonly known as: PLAVIX   1 tablet, Oral, Daily      furosemide 40 MG tablet  Commonly known as: LASIX   40 mg, Oral, Daily      Jakafi 15 MG chemo tablet  Generic drug: ruxolitinib   15 mg, Oral, 2 Times Daily      latanoprost 0.005 % ophthalmic solution  Commonly known as: XALATAN   No dose, route, or frequency recorded.       metoprolol succinate XL 25 MG 24 hr tablet  Commonly known as: TOPROL-XL   25 mg, 2 Times Daily      pantoprazole 40 MG EC tablet  Commonly known as: PROTONIX   40 mg, Oral, Daily      sertraline 50 MG tablet  Commonly known as: ZOLOFT   50 mg, Oral, Daily         Stop These Medications    lisinopril 10 MG tablet  Commonly known as: PRINIVIL,ZESTRIL            Allergies   Allergen Reactions   • Aspirin Unknown - Low Severity   • Oxycodone Unknown - Low Severity         Discharge Disposition:  Home or Self Care    Discharge Diet:  Diet Order   Procedures   • Diet Regular; Cardiac       Discharge Activity:   Activity Instructions     Measure Weight Daily      Instruct patient on daily weights          CODE STATUS:    Code Status and Medical Interventions:   Ordered at: 09/02/22 1705     Level Of Support Discussed With:    Patient     Code Status (Patient has no pulse and is not breathing):    CPR (Attempt to Resuscitate)     Medical Interventions (Patient has pulse or is breathing):    Full Support     Release to patient:    Routine Release       Future Appointments   Date Time Provider Department Center   9/13/2022  4:10 PM LAB CHAIR 6 [facility]  LAB [facility] [place]   9/13/2022  4:40 PM Darrell Reinoso MD [facility] CBC [facility] [place]   10/14/2022 12:30 PM [facility] ECHO/VAS FRONT [facility] ECHO [facility]   10/14/2022  1:30 PM Estrellita Blanco APRN [facility]  [facility]     Additional Instructions for the Follow-ups that You Need to Schedule     Ambulatory Referral to Cardiac Rehab   As directed      Discharge Follow-up with PCP   As directed       Currently Documented PCP:    Felix Su MD    PCP Phone Number:    [phone]     Follow Up Details: within 2 weeks    Follow Up: 2 Weeks         Discharge Follow-up with Specialty: Cardiology as directed; 1 Week   As directed      Specialty: Cardiology as directed    Follow Up: 1 Week            Contact information for follow-up providers     [facility] .     Specialty: Cardiac Rehabilitation  Contact information:  [address]  [place] [address] [phone]           Felix Su MD .    Specialty: Neurosurgery  Why: within 2 weeks  Contact information:  [address] [facility] [address]  [phone]                   Contact information for after-discharge care     Destination     [facility] .    Service: Assisted Living  Contact information:  [address]  [place] [address] [phone]                             Additional Instructions for the Follow-ups that You Need to Schedule     Ambulatory Referral to Cardiac Rehab   As directed      Discharge Follow-up with PCP   As directed       Currently Documented PCP:    Felix Su MD    PCP Phone Number:    [phone]     Follow Up Details: within 2 weeks    Follow Up: 2 Weeks         Discharge Follow-up with Specialty: Cardiology as directed; 1 Week   As directed      Specialty: Cardiology as directed    Follow Up: 1 Week           Time Spent on Discharge:  Greater than 30 minutes      Nahid Grigsby MD  [facility]  09/04/22  10:47 EDT              Electronically signed by Nahid Grigsby MD at 09/04/22 1055       Discharge Order (From admission, onward)     Start     Ordered    09/04/22 1047  Discharge patient  Once        Expected Discharge Date: 09/04/22    Discharge Disposition: Home or Self Care    Physician of Record for Attribution - Please select from Treatment Team: NAHID GRIGSBY [[id number]]    Review needed by CMO to determine Physician of Record: No       Question Answer Comment   Physician of Record for Attribution - Please select from Treatment Team NAHID GRIGSBY    Review needed by CMO to determine Physician of Record No        09/04/22 1046    09/04/22 0828  Discharge patient  Once,   Status:  Canceled        Expected Discharge Date: 09/04/22    Discharge Disposition: Home or Self Care    Physician of Record  for Attribution - Please select from Treatment Team: NAHID GRIGSBY [[id number]]    Review needed by CMO to determine Physician of Record: No       Question Answer Comment   Physician of Record for Attribution - Please select from Treatment Team NAHID GRIGSBY    Review needed by CMO to determine Physician of Record No        09/04/22 0833

## 2022-09-04 NOTE — DISCHARGE PLACEMENT REQUEST
"Maribell Boyer (82 y.o. Female)             Date of Birth   1940    Social Security Number       Address   [address] [place] [address] [address] [place] [address]    Home Phone   [phone]    MRN   [id number]       Mormon   Unknown    Marital Status                               Admission Date   8/31/22    Admission Type   Emergency    Admitting Provider   Oseas Song MD    Attending Provider   Oseas Song MD    Department, Room/Bed   [address] [facility], 2212/1       Discharge Date       Discharge Disposition   Home or Self Care    Discharge Destination                               Attending Provider: Oseas Song MD    Allergies: Aspirin, Oxycodone    Isolation: None   Infection: None   Code Status: CPR   Advance Care Planning Activity    Ht: 154.9 cm (61\")   Wt: 68 kg (150 lb)    Admission Cmt: None   Principal Problem: Acute on chronic diastolic heart failure (HCC) [I50.33]                 Active Insurance as of 8/31/2022     Primary Coverage     Payor Plan Insurance Group Employer/Plan Group    AETNA MEDICARE REPLACEMENT AETNA MEDICARE REPLACEMENT [id number]     Payor Plan Address Payor Plan Phone Number Payor Plan Fax Number Effective Dates    [address] [phone]  1/1/2022 - None Entered    [place] [address]       Subscriber Name Subscriber Birth Date Member ID       MARIBELL BOYER 1940 [id number]                 Emergency Contacts      (Rel.) Home Phone Work Phone Mobile Phone    MERLINTJRAY (Daughter) [phone] -- [phone]    Karen Gutierrez (Daughter) -- -- [phone]    MerlinGreg (Son) -- -- [phone]              "

## 2022-09-04 NOTE — PROGRESS NOTES
"CC: Coronary artery disease    Interval History: No acute events overnight      Vital Signs  Temp:  [97.5 °F (36.4 °C)-98.1 °F (36.7 °C)] 97.5 °F (36.4 °C)  Heart Rate:  [] 96  Resp:  [16-18] 16  BP: ()/(56-64) 113/62    Intake/Output Summary (Last 24 hours) at 9/4/2022 1008  Last data filed at 9/4/2022 0600  Gross per 24 hour   Intake 360 ml   Output 1010 ml   Net -650 ml     Flowsheet Rows    Flowsheet Row First Filed Value   Admission Height 154.9 cm (61\") Documented at 09/01/2022 0249   Admission Weight 71.9 kg (158 lb 8.2 oz) Documented at 09/01/2022 0249          PHYSICAL EXAM:  General: No acute distress  Resp:NL Rate, symmetric chest expansion,unlabored, clear  CV:NL rate and rhythm, NL PMI, NL S1 and S2, no Murmur, no gallop, no rub, No JVD.   ABD:Nl sounds, no masses or tenderness, nondistended, no guarding or rebound  Neuro: alert,cooperative and oriented  Extr:Normal pedal pulses, No edema or cyanosis, moves all extremities      Results Review:    Results from last 7 days   Lab Units 09/04/22  0316   SODIUM mmol/L 141   POTASSIUM mmol/L 4.4   CHLORIDE mmol/L 103   CO2 mmol/L 24.3   BUN mg/dL 33*   CREATININE mg/dL 0.95   GLUCOSE mg/dL 104*   CALCIUM mg/dL 8.9     Results from last 7 days   Lab Units 09/01/22  0617 08/31/22  2131   TROPONIN T ng/mL 0.030 0.010     Results from last 7 days   Lab Units 09/04/22  0316   WBC 10*3/mm3 20.96*   HEMOGLOBIN g/dL 9.2*   HEMATOCRIT % 27.3*   PLATELETS 10*3/mm3 240         Results from last 7 days   Lab Units 09/01/22  0617   CHOLESTEROL mg/dL 112     Results from last 7 days   Lab Units 09/04/22  0316   MAGNESIUM mg/dL 1.9     Results from last 7 days   Lab Units 09/01/22  0617   CHOLESTEROL mg/dL 112   TRIGLYCERIDES mg/dL 157*   HDL CHOL mg/dL 37*   LDL CHOL mg/dL 48     I reviewed the patient's new clinical results.  I personally viewed and interpreted the patient's EKG/Telemetry data        Medication Review:   Meds reviewed     "     Assessment/Plan    1.  Coronary artery disease with chest pain and abnormal myocardial perfusion study involving inferolateral wall.  Negative troponin.  Status post successful intervention with LEFTY of of a large diagonal branch in-stent restenosis.  She has  of mid RCA and nonobstructive disease of LAD.  No stent noted in the LAD.  No recurrence of chest  Preserved left ventricular ejection fraction.  Continue Plavix, Eliquis, high intensity statin.  She has severe reaction to aspirin in the past       2.  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  History of diastolic CHF-euvolemic on exam  euvolemic on exam      4.  Hypotension-  resolved     5.  Myeloproliferative disorder and macrocytic anemia-hematology follow     No acute events overnight.  No chest pain.  Vital signs within desired range  Continue metoprolol.  Restarted home oral Lasix  Patient will follow-up with cardiology clinic in 2 weeks.  Cardiology will sign off.      Billy Michel MD  09/04/22  10:08 EDT

## 2022-09-04 NOTE — DISCHARGE SUMMARY
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 Boyer is a 82 y.o. 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 initially complaining of chest pain and shortness of breath.  Please see the admitting history and physical for further details.  She was found to have an acute on chronic diastolic heart failure exacerbation causing hypoxia and was admitted to the hospital for further evaluation and treatment.      She was started on iv diuretics and seen in consultation by cardiology. They performed a stress test which was positive prompting a cardiac catheterization wherein she received a drug eluting stent to a large diagonal branch for in stent stenosis. Her shortness of breath improved and she was euvolemic and so she was  transitioned back to oral diuretics, though she continues to require oxygen at night. She has been instructed to obtain an outpatient sleep study, but she will also continue on 2L night time oxygen going forward. She will need to follow up with cardiology within the next 2 weeks.    She was also seen this admission by hematology since her wbc was relatively elevated in comparison to prior. Flow cytometry was obtained and it was noted that a bone marrow biopsy may eventually be necessary depending on the results of flow cytometry (pending at the time of discharge) as she may be having an inadequate response to Jakafi. She will need to follow up with Dr. Reinoso in clinic.     Day of Discharge     Subjective:  No events overnight. She reports 2 episodes of diarrhea yesterday, but no abdominal pain, fevers, chills, or leukocytosis. She is anxious for discharge.    Physical Exam:  Temp:  [97.5 °F (36.4 °C)-98.1 °F (36.7 °C)] 97.5 °F (36.4 °C)  Heart Rate:  [] 96  Resp:  [16-18] 16  BP: ()/(56-64) 113/62  Body mass index is 28.34 kg/m².  Physical Exam  Constitutional:       General: She is not in acute distress.     Appearance: She is not toxic-appearing.   Cardiovascular:      Rate and Rhythm: Normal rate and regular rhythm.      Heart sounds: Normal heart sounds.   Pulmonary:      Effort: Pulmonary effort is normal.      Breath sounds: Normal breath sounds.   Abdominal:      General: Bowel sounds are normal.      Palpations: Abdomen is soft.   Musculoskeletal:         General: No tenderness.      Left lower leg: No edema.   Neurological:      Mental Status: She is alert.   Psychiatric:         Mood and Affect: Mood normal.         Behavior: Behavior normal.         Consultants     Consult Orders (all) (From admission, onward)     Start     Ordered    09/03/22 3313  Hematology & Oncology Inpatient Consult  Once        Specialty:  Hematology and Oncology  Provider:  Shanae Chinchilla MD    09/03/22 3530     09/02/22 1029  Hematology & Oncology Inpatient Consult  Once        Specialty:  Hematology and Oncology  Provider:  Darrell Reinoso MD    09/02/22 1033    08/31/22 2258  Consult to Case Management   Once        Provider:  (Not yet assigned)    08/31/22 2259 08/31/22 2258  Inpatient Consult to Cardiology  Once        Specialty:  Cardiology  Provider:  Albin Barriga MD    08/31/22 2259 08/31/22 2216  [facility] (on-call MD unless specified) Details  Once        Specialty:  Hospitalist  Provider:  (Not yet assigned)    08/31/22 2215              Procedures     Imaging Results (All)     Procedure Component Value Units Date/Time    XR Chest 1 View [[id number]] Collected: 08/31/22 2135     Updated: 08/31/22 2157    Narrative:      CHEST SINGLE VIEW     HISTORY: Chest pain     COMPARISON: None     FINDINGS: Heart size is borderline enlarged. There are increased  interstitial markings that are suspected to represent hydrostatic  interstitial pulmonary edema. No superimposed airspace disease is  evident. There is no pneumothorax. Thoracic aorta is tortuous and aortic  vascular calcifications are present. There is advanced osteoarthritis at  both shoulders. Several osteochondral bodies are present on the left at  the level of the proximal humeral metaphysis.       Impression:      Increased interstitial markings are predominantly perihilar  and suspected to represent hydrostatic interstitial pulmonary edema.  There are no previous studies for comparison.     This report was finalized on 8/31/2022 9:54 PM by Dr. Torey Bedoya M.D.             Pertinent Labs     Results from last 7 days   Lab Units 09/04/22  0316 09/03/22  0313 09/02/22  0825 09/01/22  0617   WBC 10*3/mm3 20.96* 26.59* 29.03* 29.57*   HEMOGLOBIN g/dL 9.2* 8.9* 8.5* 9.1*   PLATELETS 10*3/mm3 240 216 224 238     Results from last 7 days   Lab Units 09/04/22  0316 09/03/22  0313 09/02/22  0825 09/01/22  0617   SODIUM mmol/L 141 139 141 142    POTASSIUM mmol/L 4.4 4.2 4.8 4.2   CHLORIDE mmol/L 103 103 105 105   CO2 mmol/L 24.3 23.5 23.6 24.2   BUN mg/dL 33* 34* 36* 33*   CREATININE mg/dL 0.95 0.98 1.06* 0.96   GLUCOSE mg/dL 104* 91 118* 113*   Estimated Creatinine Clearance: 38.9 mL/min (by C-G formula based on SCr of 0.95 mg/dL).  Results from last 7 days   Lab Units 08/31/22  2131   ALBUMIN g/dL 4.50   BILIRUBIN mg/dL 0.4   ALK PHOS U/L 91   AST (SGOT) U/L 16   ALT (SGPT) U/L 28     Results from last 7 days   Lab Units 09/04/22  0316 09/03/22  0313 09/02/22  0825 09/01/22  0617 08/31/22  2131   CALCIUM mg/dL 8.9 8.7 8.9 9.1 9.2   ALBUMIN g/dL  --   --   --   --  4.50   MAGNESIUM mg/dL 1.9 1.8 1.8 1.7  --    PHOSPHORUS mg/dL 3.7 4.7* 4.0  --   --      Results from last 7 days   Lab Units 08/31/22  2131   LIPASE U/L 38     Results from last 7 days   Lab Units 09/01/22  0617 08/31/22  2131   TROPONIN T ng/mL 0.030 0.010   PROBNP pg/mL  --  2,589.0*       Results from last 7 days   Lab Units 09/01/22  0617   CHOLESTEROL mg/dL 112   TRIGLYCERIDES mg/dL 157*   HDL CHOL mg/dL 37*   LDL CHOL mg/dL 48           Test Results Pending at Discharge       Discharge Details        Discharge Medications      New Medications      Instructions Start Date   empagliflozin 10 MG tablet tablet  Commonly known as: JARDIANCE   10 mg, Oral, Daily         Continue These Medications      Instructions Start Date   acetaminophen 500 MG tablet  Commonly known as: TYLENOL   500 mg, Oral, Every 4 Hours PRN      apixaban 5 MG tablet tablet  Commonly known as: ELIQUIS   5 mg, Oral, Every 12 Hours Scheduled      atorvastatin 80 MG tablet  Commonly known as: LIPITOR   1 tablet, Oral, Nightly      bimatoprost 0.01 % ophthalmic drops  Commonly known as: LUMIGAN   1 drop, Ophthalmic      clopidogrel 75 MG tablet  Commonly known as: PLAVIX   1 tablet, Oral, Daily      furosemide 40 MG tablet  Commonly known as: LASIX   40 mg, Oral, Daily      Jakafi 15 MG chemo tablet  Generic drug:  ruxolitinib   15 mg, Oral, 2 Times Daily      latanoprost 0.005 % ophthalmic solution  Commonly known as: XALATAN   No dose, route, or frequency recorded.      metoprolol succinate XL 25 MG 24 hr tablet  Commonly known as: TOPROL-XL   25 mg, 2 Times Daily      pantoprazole 40 MG EC tablet  Commonly known as: PROTONIX   40 mg, Oral, Daily      sertraline 50 MG tablet  Commonly known as: ZOLOFT   50 mg, Oral, Daily         Stop These Medications    lisinopril 10 MG tablet  Commonly known as: PRINIVIL,ZESTRIL            Allergies   Allergen Reactions   • Aspirin Unknown - Low Severity   • Oxycodone Unknown - Low Severity         Discharge Disposition:  Home or Self Care    Discharge Diet:  Diet Order   Procedures   • Diet Regular; Cardiac       Discharge Activity:   Activity Instructions     Measure Weight Daily      Instruct patient on daily weights          CODE STATUS:    Code Status and Medical Interventions:   Ordered at: 09/02/22 1705     Level Of Support Discussed With:    Patient     Code Status (Patient has no pulse and is not breathing):    CPR (Attempt to Resuscitate)     Medical Interventions (Patient has pulse or is breathing):    Full Support     Release to patient:    Routine Release       Future Appointments   Date Time Provider Department Center   9/13/2022  4:10 PM LAB CHAIR 6 [facility]  LAB [facility] [place]   9/13/2022  4:40 PM Darrell Reinoso MD [facility] [place]   10/14/2022 12:30 PM [facility] ECHO/VAS [facility] ECHO [facility]   10/14/2022  1:30 PM Estrellita Blanco APRN [facility]  [facility]     Additional Instructions for the Follow-ups that You Need to Schedule     Ambulatory Referral to Cardiac Rehab   As directed      Discharge Follow-up with PCP   As directed       Currently Documented PCP:    Felix Su MD    PCP Phone Number:    [phone]     Follow Up Details: within 2 weeks    Follow Up: 2 Weeks         Discharge Follow-up with Specialty: Cardiology as directed; 1 Week   As directed       Specialty: Cardiology as directed    Follow Up: 1 Week            Contact information for follow-up providers     [facility] .    Specialty: Cardiac Rehabilitation  Contact information:  [address]  [facility] [address] [phone]           Felix Su MD .    Specialty: Neurosurgery  Why: within 2 weeks  Contact information:  [address] [facility] [address]  [phone]                   Contact information for after-discharge care     Destination     [facility] .    Service: Assisted Living  Contact information:  [address]  [facility] [address] [phone]                             Additional Instructions for the Follow-ups that You Need to Schedule     Ambulatory Referral to Cardiac Rehab   As directed      Discharge Follow-up with PCP   As directed       Currently Documented PCP:    Felix Su MD    PCP Phone Number:    [phone]     Follow Up Details: within 2 weeks    Follow Up: 2 Weeks         Discharge Follow-up with Specialty: Cardiology as directed; 1 Week   As directed      Specialty: Cardiology as directed    Follow Up: 1 Week           Time Spent on Discharge:  Greater than 30 minutes      Oseas Song MD  [facility]  09/04/22  10:47 EDT

## 2022-09-05 NOTE — OUTREACH NOTE
Prep Survey    Flowsheet Row Responses   [facility] facility patient discharged from? [place]   Is LACE score < 7 ? No   Emergency Room discharge w/ pulse ox? No   Eligibility Readm Mgmt   Discharge diagnosis CHF   Does the patient have one of the following disease processes/diagnoses(primary or secondary)? CHF   Does the patient have Home health ordered? No   Is there a DME ordered? Yes   What DME was ordered? O2   General alerts for this patient [facility] EDDIE [facility]   Prep survey completed? Yes          KELLY MEZA - Registered Nurse

## 2022-09-06 LAB — ACT BLD: 335 SECONDS (ref 82–152)

## 2022-09-08 ENCOUNTER — READMISSION MANAGEMENT (OUTPATIENT)
Dept: CALL CENTER | Facility: HOSPITAL | Age: 82
End: 2022-09-08

## 2022-09-08 NOTE — OUTREACH NOTE
CHF Week 1 Survey    Flowsheet Row Responses   [facility] facility patient discharged from? [place]   Does the patient have one of the following disease processes/diagnoses(primary or secondary)? CHF   CHF Week 1 attempt successful? No   Unsuccessful attempts Attempt 1          SANDEE ENNIS - Registered Nurse

## 2022-09-09 NOTE — PROGRESS NOTES
"[facility] CBC GROUP OUTPATIENT FOLLOW UP CLINIC VISIT    REASON FOR FOLLOW-UP:    Myelofibrosis, on Jakafi    HISTORY OF PRESENT ILLNESS:  Catherine Boyer is a 82 y.o. female who returns today for follow up of the above issue.      Recent admission with heart failure, positive stress test and LEFTY to a large diagonal branch for in sent stenosis.    WBC was high. Dr. Chinchilla ordered flow cytometry during the admission but this was not collected and sent. WBC was improving at d/c. Concern for inadequate response to Jakafi     She is improving at this point and getting stronger.  No fevers or chills.  Ongoing fatigue but this is improving.  She continues Jakafi and only missed 1 day recently.      REVIEW OF SYSTEMS:  As per HPI    PHYSICAL EXAMINATION:    Vitals:    09/13/22 1639   BP: 145/71   Pulse: 82   Resp: 16   Temp: 97.1 °F (36.2 °C)   TempSrc: Temporal   SpO2: 94%   Weight: 71.4 kg (157 lb 4.8 oz)   Height: 154.9 cm (60.98\")   PainSc: 0-No pain     General:  No acute distress, awake, alert and oriented  Skin:  Warm and dry, no visible rash  HEENT:  Normocephalic/atraumatic.  Wearing a face mask.  Chest:  Normal respiratory effort  Extremities:  No visible clubbing, cyanosis, or edema  Neuro/psych:  Grossly nonfocal.  Normal mood and affect.      DIAGNOSTIC DATA:  Retic With IRF & RET-He (09/13/2022 16:33)  CBC & Differential (09/13/2022 16:33)      IMAGING:  None reviewed    ASSESSMENT:  This is a 82 y.o. female with:    *JAK2 positive myeloproliferative disorder, perhaps primary myelofibrosis  · She has been seen by Dr. Gerard Foreman with [facility] specialists and cancer and blood disorders in [place].      · She was seen there initially on 6/4/2021 with leukocytosis with a white blood cell count of 104,000.  Hemoglobin was normal at 11.6 and platelets were normal at 299,000.  PCR for BCR/ABL and FISH for BCR/ABL were negative.  A bone marrow aspiration was attempted on 6/9/2021 which was unsuccessful.  She then " went to [place] for the summer.  She was admitted to the [facility] between 6/29/2021 and 7/9/2021.  She had a bone marrow aspiration and biopsy performed there on 6/30/2021 showing a hypercellular marrow at greater than 95% with 1% blasts.  Focal 1+ reticulin fibrosis was noted.  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  PCR for BCR/ABL was negative.  JAK2 V617F PCR was positive.  Cytogenetics were normal.  Next generation myeloid disorder profiling of the bone marrow aspirate from 6/30/2021 showed TET2 K4736G and [id number]*47 abnormalities and JAK2 V617F.  Therefore she was suspected to have early primary myelofibrosis and she was started on hydroxyurea 1000 mg daily.  Subsequently, hydroxyurea was held.  Blood counts had improved.  She was admitted to the hospital from 2/9 through 2/15/2022 for symptomatic anemia and chest pain.  Her hemoglobin was 5.5.  The white blood cell count was 7.6.  Platelets were 56,000.  Fecal occult blood testing was negative.  She received 3 units of packed red blood cells.  No endoscopy was performed.  A left heart catheterization was performed with a drug-eluting stent placed to the second diagonal on 2/11/2022 and she was discharged from that hospitalization on aspirin 81 mg, Plavix 75 mg, and Eliquis 5 mg twice daily.  Blood counts improved by 3/7/2022 and her white blood cell count was 7.2 with a hemoglobin 11.4 and platelets 295,000.  She did have a bone marrow aspiration and biopsy on 3/7/2022 showing chronic myeloproliferative neoplasm with potentially post ET fibrosis or consideration of primary myelofibrosis.  There was no evidence for myelodysplasia.  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  She also has atrial fibrillation and is anticoagulated with Eliquis.    · Hydroxyurea was discontinued and she was started on ruxolitinib 15 mg twice daily at her  visit on 3/14/2022 with Dr. Gerard Foreman.  · She had CT imaging of the chest abdomen pelvis on 3/19/2022 that did not demonstrate any splenomegaly.  Calcified mediastinal lymphadenopathy was noted.  Borderline pretracheal and right hilar lymphadenopathy noted.  Small bilateral pleural effusions with bibasilar atelectasis noted.  · She has moved from [place] to [place] and is seen initially in the office on 5/15/2022.  She tolerates ruxolitinib well.  White blood cell count has decreased from 5.9 from 19.6.  Platelets have normalized at 170,000, down from 464,000.  The hemoglobin has also decreased at 8.6 with an MCV of 98.9.  · 5/26/2022: White blood cell count mildly elevated at 12.4 with a normal platelet count at 216,000.  She tolerates Jakafi very well.   · 6/30/2022: White blood cell count a little higher.  Continue monitoring.  · 9/13/2022: White blood cell count higher at 32,000     *Microcytic anemia  · Hemoglobin on 5/5/2022 was declining at 8.6.  · Ferritin 242 with an iron of 105 and 33% iron saturation, ferritin vitamin B12 1107, folic acid 15.1, normal RBC folate, creatinine 0.86,   · 5/26/2022: Hemoglobin stable at 8.5.  The reticulocyte count is a little bit more elevated.  I am hopeful that the hemoglobin will improve.  If it does not we will need to consider Procrit.  · 6/30/2022: Hemoglobin improving at 9.5 with reticulocyte count of 3%.   · 9/13/2022: Hemoglobin improving at 10.4     *History of marantic endocarditis and atrial fibrillation  · Anticoagulated with Eliquis 5 mg twice daily     *Recent admission with heart failure, positive stress test and LEFTY to a large diagonal branch for in sent stenosis.      PLAN:   1. Continue ruxolitinib (Jakafi) 15 mg twice daily.  This was initiated prior to her being seen here in the office but we are prescribing this at this point.  2. She continues Eliquis 5 mg twice daily as well as Plavix 75 mg daily.  3. Peripheral blood flow cytometry will  be sent today as this was not done during her recent hospitalization though the test was ordered  4. I will follow-up the results of this with her and her daughter in the next couple of days.  If there are abnormalities we will plan for a bone marrow aspiration and biopsy at that point.    High risk medication requiring intensive monitoring

## 2022-09-12 ENCOUNTER — OFFICE VISIT (OUTPATIENT)
Dept: CARDIOLOGY | Facility: CLINIC | Age: 82
End: 2022-09-12

## 2022-09-12 VITALS
WEIGHT: 157 LBS | HEART RATE: 91 BPM | SYSTOLIC BLOOD PRESSURE: 132 MMHG | DIASTOLIC BLOOD PRESSURE: 68 MMHG | BODY MASS INDEX: 29.64 KG/M2 | HEIGHT: 61 IN

## 2022-09-12 DIAGNOSIS — I10 PRIMARY HYPERTENSION: ICD-10-CM

## 2022-09-12 DIAGNOSIS — Z99.81 HYPOXEMIA REQUIRING SUPPLEMENTAL OXYGEN: ICD-10-CM

## 2022-09-12 DIAGNOSIS — I48.0 PAROXYSMAL ATRIAL FIBRILLATION: ICD-10-CM

## 2022-09-12 DIAGNOSIS — R09.02 HYPOXEMIA REQUIRING SUPPLEMENTAL OXYGEN: ICD-10-CM

## 2022-09-12 DIAGNOSIS — I50.33 ACUTE ON CHRONIC DIASTOLIC CHF (CONGESTIVE HEART FAILURE): ICD-10-CM

## 2022-09-12 DIAGNOSIS — I25.10 CORONARY ARTERY DISEASE INVOLVING NATIVE CORONARY ARTERY OF NATIVE HEART WITHOUT ANGINA PECTORIS: Primary | ICD-10-CM

## 2022-09-12 DIAGNOSIS — G47.34 NOCTURNAL HYPOXIA: ICD-10-CM

## 2022-09-12 DIAGNOSIS — I38 NONBACTERIAL THROMBOTIC ENDOCARDITIS: ICD-10-CM

## 2022-09-12 PROCEDURE — 99214 OFFICE O/P EST MOD 30 MIN: CPT | Performed by: NURSE PRACTITIONER

## 2022-09-12 PROCEDURE — 93000 ELECTROCARDIOGRAM COMPLETE: CPT | Performed by: NURSE PRACTITIONER

## 2022-09-12 NOTE — PROGRESS NOTES
Date of Office Visit: 2022  Encounter Provider: ABEL Ferrell  Place of Service: [facility] CARDIOLOGY  Patient Name: Catherine Boyer  :1940  Primary Cardiologist: Dr. Albin Barriga    Chief Complaint   Patient presents with   • Coronary Artery Disease   • Cardiomyopathy   • Hospital Follow Up Visit   :     HPI: Catherine Boyer is a pleasant 82 y.o. female who presents today for follow-up on coronary artery disease and diastolic heart failure.  She is a new patient to me and I have reviewed her medical records.    She has been diagnosed with type II diabetes, hypertension, and mild carotid disease.  She also has a history of right-sided breast cancer and underwent lumpectomy and radiation.  She did not require chemotherapy or hormonal therapy.    In 2021, while in [place] she was diagnosed with atrial fibrillation, nonbacterial endocarditis, JAK2+ myeloproliferative disorder.    In 2022, she presented with heartburn symptoms and chest pain.  She was markedly anemic with a hemoglobin of 5.5 and required PRBC infusion.  She was diagnosed with a non-STEMI.  She underwent stent placement to the diagonal.  She had a long lesion in the mid distal LAD that was treated medically.  EF was 45-50%.  She was previously followed at [facility] in [place] and recently moved to [place].    In 2022, she presented to [facility], [place], operated by [facility] ED with chest pain and shortness of breath.  She was treated for acute on chronic diastolic heart failure with hypoxia.  Stress test was positive.  Cardiac catheterization was completed and she underwent drug-eluting stent placement to the large diagonal branch for in-stent stenosis.  She has  of mid RCA and nonobstructive disease of the LAD.  There was no stent in LAD.  Her shortness of breath improved with IV diuresis and she was discharged on 2 L of oxygen nasal cannula.  She was lisinopril was discontinued, she was started on  empagliflozin and discharged on her home dose of furosemide 40 mg daily.    She presents today for hospital follow-up visit with her daughter accompanying her.  She is feeling much better since the hospitalization. She has remained somewhat fatigued.  She is wearing the oxygen at nighttime and feels that she has a dry nose.  2 nights ago she had a nosebleed in the middle of the night that she was unaware of.  She denies chest pain, shortness of breath, palpitations, edema, dizziness, or syncope.  She said her weight may be up, but she is not exactly sure because she is getting confused between our scale and her scale.      Past Medical History:   Diagnosis Date   • Atrial fibrillation (HCC)    • Breast cancer (HCC)    • CAD (coronary artery disease)     NSTEMI 2/2022: 90% ostial LAD, 99% D1, 70% mid-distal LAD (medical therapy). She received two stents (2.5x18 and 2.5x26mm [place] LEFTY) but I don't know which one went to which lesion.   • Carotid atherosclerosis    • Chronic diastolic (congestive) heart failure (HCC)    • GERD (gastroesophageal reflux disease)    • Glaucoma    • History of cataract    • Hypertension    • Microcytic anemia     per Dr. oleg pate office  note 6/30/22-dd   • Myeloproliferative disorder (HCC)     JAK2 positive   • Nonbacterial thrombotic endocarditis     6/2021: 4x5mm vegetation on the ventricular surface of the anterior MV, negative blood cultures   • Porcelain gallbladder    • PUD (peptic ulcer disease)    • TIA (transient ischemic attack)    • Type 2 diabetes mellitus (HCC)    • Upper GI bleed        Past Surgical History:   Procedure Laterality Date   • BREAST LUMPECTOMY  1999   • CARDIAC CATHETERIZATION N/A 9/2/2022    Procedure: Coronary angiography;  Surgeon: Guero Verde MD;  Location: [facility] CATH INVASIVE LOCATION;  Service: Cardiovascular;  Laterality: N/A;   • CARDIAC CATHETERIZATION N/A 9/2/2022    Procedure: Stent LEFTY coronary;  Surgeon: Guero Verde MD;  Location:  "[facility] LOCATION;  Service: Cardiovascular;  Laterality: N/A;   • CATARACT EXTRACTION  2011       Social History     Socioeconomic History   • Marital status:    Tobacco Use   • Smoking status: Former Smoker     Packs/day: 1.00     Years: 20.00     Pack years: 20.00   • Smokeless tobacco: Never Used   • Tobacco comment: 30  years ago   Substance and Sexual Activity   • Alcohol use: Yes     Comment: \"rare\"   • Drug use: Never       Family History   Problem Relation Age of Onset   • Ovarian cancer Mother    • Lung cancer Father    • Lung cancer Sister        The following portion of the patient's history were reviewed and updated as appropriate: past medical history, past surgical history, past social history, past family history, allergies, current medications, and problem list.    Review of Systems   Constitutional: Positive for malaise/fatigue.   Cardiovascular: Negative.    Respiratory: Negative.    Hematologic/Lymphatic: Negative.    Neurological: Negative.        Allergies   Allergen Reactions   • Aspirin Unknown - Low Severity   • Oxycodone Unknown - Low Severity         Current Outpatient Medications:   •  acetaminophen (TYLENOL) 500 MG tablet, Take 500 mg by mouth As Needed., Disp: , Rfl:   •  apixaban (ELIQUIS) 5 MG tablet tablet, Take 1 tablet by mouth Every 12 (Twelve) Hours for 180 days., Disp: 60 tablet, Rfl: 5  •  atorvastatin (LIPITOR) 80 MG tablet, Take 1 tablet by mouth Every Night., Disp: , Rfl:   •  bimatoprost (LUMIGAN) 0.01 % ophthalmic drops, Apply 1 drop to eye(s) as directed by provider., Disp: , Rfl:   •  clopidogrel (PLAVIX) 75 MG tablet, Take 1 tablet by mouth Daily., Disp: , Rfl:   •  empagliflozin (JARDIANCE) 10 MG tablet tablet, Take 1 tablet by mouth Daily., Disp: 90 tablet, Rfl: 3  •  furosemide (LASIX) 40 MG tablet, Take 1 tablet by mouth Daily., Disp: 90 tablet, Rfl: 3  •  Jakafi 15 MG chemo tablet, Take 1 tablet by mouth 2 (Two) Times a Day., Disp: 60 tablet, " "Rfl: 3  •  latanoprost (XALATAN) 0.005 % ophthalmic solution, , Disp: , Rfl:   •  metoprolol succinate XL (TOPROL-XL) 25 MG 24 hr tablet, 25 mg 2 (Two) Times a Day., Disp: , Rfl:   •  pantoprazole (PROTONIX) 40 MG EC tablet, Take 40 mg by mouth Daily., Disp: , Rfl:   •  sertraline (ZOLOFT) 50 MG tablet, Take 50 mg by mouth Daily., Disp: , Rfl:         Objective:     Vitals:    09/12/22 1035   BP: 132/68   BP Location: Left arm   Patient Position: Sitting   Cuff Size: Adult   Pulse: 91   Weight: 71.2 kg (157 lb)   Height: 154.9 cm (61\")     Body mass index is 29.66 kg/m².    PHYSICAL EXAM:    Vitals Reviewed.   General Appearance: No acute distress, well developed and well nourished.   Eyes: Conjunctiva and lids: No erythema, swelling, or discharge. Sclera non-icteric. Glasses.   HENT: Atraumatic, normocephalic. External eyes, ears, and nose normal. No hearing loss noted. Mucous membranes normal. Lips not cyanotic. Neck supple with no tenderness. Wearing mask.   Respiratory: No signs of respiratory distress. Respiration rhythm and depth normal.   Clear to auscultation. No rales, crackles, rhonchi, or wheezing auscultated.   Cardiovascular:  Jugular Venous Pressure: Normal  Heart Rate and Rhythm: Normal, Heart Sounds: Normal S1 and S2. No S3 or S4 noted.  Murmurs: No murmurs noted. No rubs, thrills, or gallops.   Lower Extremities: No edema noted.  Right wrist clean dry and intact.  Gastrointestinal:  Abdomen soft, non-distended, non-tender.    Musculoskeletal: Normal movement of extremities.  Skin and Nails: General appearance normal. No pallor, cyanosis, diaphoresis. Skin temperature normal. No clubbing of fingernails.   Psychiatric: Patient alert and oriented to person, place, and time. Speech and behavior appropriate. Normal mood and affect.       ECG 12 Lead    Date/Time: 9/12/2022 10:42 AM  Performed by: Estrellita Blanco APRN  Authorized by: Estrellita Blanco APRN   Comparison: compared with previous ECG "   Similar to previous ECG  Rhythm: sinus rhythm  Rate: normal  BPM: 91  Conduction: conduction normal  QRS axis: normal  Other findings: non-specific ST-T wave changes    Clinical impression: non-specific ECG              Assessment:       Diagnosis Plan   1. Coronary artery disease involving native coronary artery of native heart without angina pectoris     2. Acute on chronic diastolic CHF (congestive heart failure) (HCC)     3. Nonbacterial thrombotic endocarditis     4. Paroxysmal atrial fibrillation ([facility])     5. Primary hypertension     6. Nocturnal hypoxia  Ambulatory Referral to Sleep Medicine   7. Hypoxemia requiring supplemental oxygen  Ambulatory Referral to Sleep Medicine          Plan:       1.  Coronary Artery Disease: Status post stent placement to the diagonal in February 2022.  Recently hospitalized and underwent stent placement to the diagonal for restenosis.  Denies anginal symptoms.  Continue clopidogrel and atorvastatin.  She wants to think about participating in cardiac rehab because currently she does not have transportation.  I asked her to look to see if [facility] where she lives could arrange transportation.    2.  Acute Diastolic Heart Failure: She said she likes to eat cheez its and drink diet Pepsi.  She denies heart failure symptoms today.  She is not sure if she has gained weight, but second check at home.  If she has gained 3 to 5 pounds, I asked her to take an extra dose of furosemide today and tomorrow.  We discussed the importance of following a low-sodium diet less than 2000 mg daily.  I told her I could refer her to the heart failure clinic for education purposes and she wants to think about it.    3.  Nonbacterial thrombotic endocarditis: Diagnosed in June 2021.  Dr. Barriga reviewed recent echocardiogram and felt that it was stable.  She needs SBE prophylaxis.    4.  Paroxysmal Atrial Fibrillation: Currently in a normal sinus rhythm.  Asymptomatic.  Continue metoprolol succinate and  apixaban.    Atrial Fibrillation and Atrial Flutter  Assessment  • The patient has paroxysmal atrial fibrillation  • This is non-valvular in etiology  • The patient's CHADS2-VASc score is 9  • A BUM1OE2-ZNIs score of 2 or more is considered a high risk for a thromboembolic event  • Apixaban prescribed      5.  Hypertension: Blood pressure stable.    6/7.  Nocturnal Hypoxia: Referred to [facility] sleep medicine to see if she needs to continue on nocturnal oxygen long-term.  She recently had a nosebleed from a dry nose.  If she remains on oxygen, she would benefit from humidity.    8.  She will follow-up with Dr. Barriga in 3 months.    As always, it has been a pleasure to participate in your patient's care. Thank you.       Sincerely,         ABEL Urbina  [facility] Cardiology      · Dictated utilizing Dragon Dictation  · COVID-19 Precautions - Patient was compliant in wearing a mask. When I saw the patient, I used appropriate personal protective equipment (PPE) including mask and eye shield (standard procedure).  Additionally, I used gown and gloves if indicated.  Hand hygiene was completed before and after seeing the patient.  · I spent 32 minutes reviewing her medical records/testing/previous office notes/labs, face-to-face interaction with patient, physical examination, formulating the plan of care, and discussion of plan of care with patient.

## 2022-09-13 ENCOUNTER — LAB (OUTPATIENT)
Dept: LAB | Facility: HOSPITAL | Age: 82
End: 2022-09-13

## 2022-09-13 ENCOUNTER — OFFICE VISIT (OUTPATIENT)
Dept: ONCOLOGY | Facility: CLINIC | Age: 82
End: 2022-09-13

## 2022-09-13 VITALS
BODY MASS INDEX: 29.7 KG/M2 | HEIGHT: 61 IN | RESPIRATION RATE: 16 BRPM | TEMPERATURE: 97.1 F | DIASTOLIC BLOOD PRESSURE: 71 MMHG | WEIGHT: 157.3 LBS | HEART RATE: 82 BPM | OXYGEN SATURATION: 94 % | SYSTOLIC BLOOD PRESSURE: 145 MMHG

## 2022-09-13 DIAGNOSIS — D53.9 MACROCYTIC ANEMIA: ICD-10-CM

## 2022-09-13 DIAGNOSIS — D47.1 MYELOPROLIFERATIVE DISORDER: Primary | ICD-10-CM

## 2022-09-13 DIAGNOSIS — D75.81 MYELOFIBROSIS: ICD-10-CM

## 2022-09-13 LAB
ALBUMIN SERPL-MCNC: 5 G/DL (ref 3.5–5.2)
ALBUMIN/GLOB SERPL: 1.9 G/DL (ref 1.1–2.4)
ALP SERPL-CCNC: 105 U/L (ref 38–116)
ALT SERPL W P-5'-P-CCNC: 33 U/L (ref 0–33)
ANION GAP SERPL CALCULATED.3IONS-SCNC: 15.1 MMOL/L (ref 5–15)
AST SERPL-CCNC: 24 U/L (ref 0–32)
BASOPHILS # BLD AUTO: 0.32 10*3/MM3 (ref 0–0.2)
BASOPHILS NFR BLD AUTO: 1 % (ref 0–1.5)
BILIRUB SERPL-MCNC: 0.3 MG/DL (ref 0.2–1.2)
BUN SERPL-MCNC: 31 MG/DL (ref 6–20)
BUN/CREAT SERPL: 29.8 (ref 7.3–30)
CALCIUM SPEC-SCNC: 9.6 MG/DL (ref 8.5–10.2)
CHLORIDE SERPL-SCNC: 97 MMOL/L (ref 98–107)
CO2 SERPL-SCNC: 25.9 MMOL/L (ref 22–29)
CREAT SERPL-MCNC: 1.04 MG/DL (ref 0.6–1.1)
DEPRECATED RDW RBC AUTO: 63.4 FL (ref 37–54)
EGFRCR SERPLBLD CKD-EPI 2021: 53.1 ML/MIN/1.73
EOSINOPHIL # BLD AUTO: 0.31 10*3/MM3 (ref 0–0.4)
EOSINOPHIL NFR BLD AUTO: 1 % (ref 0.3–6.2)
ERYTHROCYTE [DISTWIDTH] IN BLOOD BY AUTOMATED COUNT: 16.2 % (ref 12.3–15.4)
GLOBULIN UR ELPH-MCNC: 2.6 GM/DL (ref 1.8–3.5)
GLUCOSE SERPL-MCNC: 148 MG/DL (ref 74–124)
HCT VFR BLD AUTO: 31.8 % (ref 34–46.6)
HGB BLD-MCNC: 10.4 G/DL (ref 12–15.9)
HGB RETIC QN AUTO: 35 PG (ref 29.8–36.1)
IMM GRANULOCYTES # BLD AUTO: 2.87 10*3/MM3 (ref 0–0.05)
IMM GRANULOCYTES NFR BLD AUTO: 8.9 % (ref 0–0.5)
IMM RETICS NFR: 27.4 % (ref 3–15.8)
LYMPHOCYTES # BLD AUTO: 3.86 10*3/MM3 (ref 0.7–3.1)
LYMPHOCYTES NFR BLD AUTO: 12 % (ref 19.6–45.3)
MCH RBC QN AUTO: 34.9 PG (ref 26.6–33)
MCHC RBC AUTO-ENTMCNC: 32.7 G/DL (ref 31.5–35.7)
MCV RBC AUTO: 106.7 FL (ref 79–97)
MONOCYTES # BLD AUTO: 3.82 10*3/MM3 (ref 0.1–0.9)
MONOCYTES NFR BLD AUTO: 11.8 % (ref 5–12)
NEUTROPHILS NFR BLD AUTO: 21.1 10*3/MM3 (ref 1.7–7)
NEUTROPHILS NFR BLD AUTO: 65.3 % (ref 42.7–76)
NRBC BLD AUTO-RTO: 0.1 /100 WBC (ref 0–0.2)
PLATELET # BLD AUTO: 328 10*3/MM3 (ref 140–450)
PMV BLD AUTO: 10.9 FL (ref 6–12)
POTASSIUM SERPL-SCNC: 5.1 MMOL/L (ref 3.5–4.7)
PROT SERPL-MCNC: 7.6 G/DL (ref 6.3–8)
RBC # BLD AUTO: 2.98 10*6/MM3 (ref 3.77–5.28)
RETICS # AUTO: 0.09 10*6/MM3 (ref 0.02–0.13)
RETICS/RBC NFR AUTO: 2.96 % (ref 0.7–1.9)
SODIUM SERPL-SCNC: 138 MMOL/L (ref 134–145)
WBC NRBC COR # BLD: 32.28 10*3/MM3 (ref 3.4–10.8)

## 2022-09-13 PROCEDURE — 80053 COMPREHEN METABOLIC PANEL: CPT

## 2022-09-13 PROCEDURE — 88184 FLOWCYTOMETRY/ TC 1 MARKER: CPT | Performed by: INTERNAL MEDICINE

## 2022-09-13 PROCEDURE — 85025 COMPLETE CBC W/AUTO DIFF WBC: CPT

## 2022-09-13 PROCEDURE — 88185 FLOWCYTOMETRY/TC ADD-ON: CPT | Performed by: INTERNAL MEDICINE

## 2022-09-13 PROCEDURE — 36415 COLL VENOUS BLD VENIPUNCTURE: CPT

## 2022-09-13 PROCEDURE — 85046 RETICYTE/HGB CONCENTRATE: CPT

## 2022-09-13 PROCEDURE — 88182 CELL MARKER STUDY: CPT | Performed by: INTERNAL MEDICINE

## 2022-09-13 PROCEDURE — 99214 OFFICE O/P EST MOD 30 MIN: CPT | Performed by: INTERNAL MEDICINE

## 2022-09-14 ENCOUNTER — READMISSION MANAGEMENT (OUTPATIENT)
Dept: CALL CENTER | Facility: HOSPITAL | Age: 82
End: 2022-09-14

## 2022-09-14 NOTE — OUTREACH NOTE
CHF Week 2 Survey    Flowsheet Row Responses   [facility] patient discharged from? [place]   Does the patient have one of the following disease processes/diagnoses(primary or secondary)? CHF   Week 2 attempt successful? Yes   Call start time 1123   Call end time 1133   Discharge diagnosis CHF   Person spoke with today (if not patient) and relationship seth Jones   Meds reviewed with patient/caregiver? Yes   Is the patient having any side effects they believe may be caused by any medication additions or changes? No   Does the patient have all medications ordered at discharge? Yes   Is the patient taking all medications as directed (includes completed medication regime)? Yes   Does the patient have a primary care provider?  Yes   Does the patient have an appointment with their PCP within 7 days of discharge? Yes   Has the patient kept scheduled appointments due by today? N/A   What DME was ordered? O2   Has all DME been delivered? Yes   Psychosocial issues? No   Did the patient receive a copy of their discharge instructions? Yes   Nursing interventions Reviewed instructions with patient   What is the patient's perception of their health status since discharge? Improving   Nursing interventions Nurse provided patient education   Is the patient able to teach back signs and symptoms of worsening condition? (i.e. weight gain, shortness of air, etc.) Yes   If the patient is a current smoker, are they able to teach back resources for cessation? Not a smoker   Is the patient/caregiver able to teach back the hierarchy of who to call/visit for symptoms/problems? PCP, Specialist, Home health nurse, Urgent Care, ED, 911 Yes   Is the patient able to teach back Heart Failure Zones? Yes   CHF Nursing Interventions Education provided on various zones   CHF Zone this Call Green Zone   Green Zone Patient reports doing well, No chest pain, Weight check stable, No new or worsening shortness of breath, No new swelling -   feet, ankles and legs look normal for you   Green Zone Interventions Daily weight check, Meds as directed, Low sodium diet, Follow up visits planned   CHF Week 2 call completed? Yes   Wrap up additional comments obdulia Jonesr, states pt is doing good, and getting a little better each day. Dtr reports pt using 2LO2 at nite, but having nose bleeds r/t drying nares out. Dtr advised to call PCP to report pt's nose bleeds r/t oxygen. Dtr verbalized understanding. Pt has a PCP that comes to  [facility].    Call end time [id number]          CONRAD MEZA - Registered Nurse

## 2022-09-16 LAB — REF LAB TEST METHOD: NORMAL

## 2022-09-26 NOTE — PROGRESS NOTES
"[facility] CBC GROUP OUTPATIENT FOLLOW UP CLINIC VISIT    REASON FOR FOLLOW-UP:    Myelofibrosis, on Jakafi    HISTORY OF PRESENT ILLNESS:  Catherine Boyer is a 82 y.o. female who returns today for follow up of the above issue.      She states that she feels well at this point.  She tolerates Jakafi very well.      REVIEW OF SYSTEMS:  As per HPI    PHYSICAL EXAMINATION:    Vitals:    09/27/22 1640   BP: 135/82   Pulse: 79   Resp: 18   Temp: 97.3 °F (36.3 °C)   TempSrc: Temporal   SpO2: 93%   Weight: 72.7 kg (160 lb 3.2 oz)   Height: 154.9 cm (60.98\")   PainSc: 0-No pain      General:  No acute distress, awake, alert and oriented.  Walks with a cane.  Skin:  Warm and dry, no visible rash  HEENT:  Normocephalic/atraumatic.  Wearing a face mask.  Chest:  Normal respiratory effort  Extremities:  No visible clubbing, cyanosis, or edema  Neuro/psych:  Grossly nonfocal.  Normal mood and affect.        DIAGNOSTIC DATA:  CBC & Differential (09/27/2022 16:07)      IMAGING:  None reviewed    ASSESSMENT:  This is a 82 y.o. female with:    *JAK2 positive myeloproliferative disorder, perhaps primary myelofibrosis  · She has been seen by Dr. Gerard Foreman with [facility] specialists and cancer and blood disorders in [place].      · She was seen there initially on 6/4/2021 with leukocytosis with a white blood cell count of 104,000.  Hemoglobin was normal at 11.6 and platelets were normal at 299,000.  PCR for BCR/ABL and FISH for BCR/ABL were negative.  A bone marrow aspiration was attempted on 6/9/2021 which was unsuccessful.  She then went to [place] for the summer.  She was admitted to the [facility] between 6/29/2021 and 7/9/2021.  She had a bone marrow aspiration and biopsy performed there on 6/30/2021 showing a hypercellular marrow at greater than 95% with 1% blasts.  Focal 1+ reticulin fibrosis was noted.  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  PCR for BCR/ABL was negative.  JAK2 V617F PCR was " positive.  Cytogenetics were normal.  Next generation myeloid disorder profiling of the bone marrow aspirate from 6/30/2021 showed TET2 C3327A and [id number] abnormalities and JAK2 V617F.  Therefore she was suspected to have early primary myelofibrosis and she was started on hydroxyurea 1000 mg daily.  Subsequently, hydroxyurea was held.  Blood counts had improved.  She was admitted to the hospital from 2/9 through 2/15/2022 for symptomatic anemia and chest pain.  Her hemoglobin was 5.5.  The white blood cell count was 7.6.  Platelets were 56,000.  Fecal occult blood testing was negative.  She received 3 units of packed red blood cells.  No endoscopy was performed.  A left heart catheterization was performed with a drug-eluting stent placed to the second diagonal on 2/11/2022 and she was discharged from that hospitalization on aspirin 81 mg, Plavix 75 mg, and Eliquis 5 mg twice daily.  Blood counts improved by 3/7/2022 and her white blood cell count was 7.2 with a hemoglobin 11.4 and platelets 295,000.  She did have a bone marrow aspiration and biopsy on 3/7/2022 showing chronic myeloproliferative neoplasm with potentially post ET fibrosis or consideration of primary myelofibrosis.  There was no evidence for myelodysplasia.  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  She also has atrial fibrillation and is anticoagulated with Eliquis.    · Hydroxyurea was discontinued and she was started on ruxolitinib 15 mg twice daily at her visit on 3/14/2022 with Dr. Gerard Foreman.  · She had CT imaging of the chest abdomen pelvis on 3/19/2022 that did not demonstrate any splenomegaly.  Calcified mediastinal lymphadenopathy was noted.  Borderline pretracheal and right hilar lymphadenopathy noted.  Small bilateral pleural effusions with bibasilar atelectasis noted.  · She has moved from [place] to [place] and is seen initially in the  office on 5/15/2022.  She tolerates ruxolitinib well.  White blood cell count has decreased from 5.9 from 19.6.  Platelets have normalized at 170,000, down from 464,000.  The hemoglobin has also decreased at 8.6 with an MCV of 98.9.  · 5/26/2022: White blood cell count mildly elevated at 12.4 with a normal platelet count at 216,000.  She tolerates Jakafi very well.   · 6/30/2022: White blood cell count a little higher.  Continue monitoring.  · 9/13/2022: White blood cell count higher at 32,000  · Flow cytometry 9/13/2022 with 0.1% myeloblasts with a typical (normal) phenotype.  Nonspecific monocyte population.     *Microcytic anemia  · Hemoglobin on 5/5/2022 was declining at 8.6.  · Ferritin 242 with an iron of 105 and 33% iron saturation, ferritin vitamin B12 1107, folic acid 15.1, normal RBC folate, creatinine 0.86,   · 5/26/2022: Hemoglobin stable at 8.5.  The reticulocyte count is a little bit more elevated.  I am hopeful that the hemoglobin will improve.  If it does not we will need to consider Procrit.  · 6/30/2022: Hemoglobin improving at 9.5 with reticulocyte count of 3%.   · 9/13/2022: Hemoglobin improving at 10.4  · 9/27/2022: Hemoglobin stable at 10.1     *History of marantic endocarditis and atrial fibrillation  · Anticoagulated with Eliquis 5 mg twice daily     *Recent admission with heart failure, positive stress test and LEFTY to a large diagonal branch for in sent stenosis.      PLAN:   1. Continue ruxolitinib (Jakafi) 15 mg twice daily.  This was initiated prior to her being seen here in the office but we are prescribing this at this point.  2. She continues Eliquis 5 mg twice daily as well as Plavix 75 mg daily.  3. Due to the rising white blood cell count, I have suggested a bone marrow aspiration and biopsy.  She is agreeable.  She will hold Eliquis for 2 days prior to the biopsy.  She should be able to continue Plavix for the biopsy but ultimately this will be up to radiology.  4. I will  see her back after the bone marrow biopsy for review    High risk medication requiring intensive monitoring

## 2022-09-27 ENCOUNTER — LAB (OUTPATIENT)
Dept: LAB | Facility: HOSPITAL | Age: 82
End: 2022-09-27

## 2022-09-27 ENCOUNTER — OFFICE VISIT (OUTPATIENT)
Dept: ONCOLOGY | Facility: CLINIC | Age: 82
End: 2022-09-27

## 2022-09-27 VITALS
HEIGHT: 61 IN | WEIGHT: 160.2 LBS | OXYGEN SATURATION: 93 % | DIASTOLIC BLOOD PRESSURE: 82 MMHG | SYSTOLIC BLOOD PRESSURE: 135 MMHG | RESPIRATION RATE: 18 BRPM | TEMPERATURE: 97.3 F | HEART RATE: 79 BPM | BODY MASS INDEX: 30.25 KG/M2

## 2022-09-27 DIAGNOSIS — D47.1 MYELOPROLIFERATIVE DISORDER: ICD-10-CM

## 2022-09-27 DIAGNOSIS — D47.1 MYELOPROLIFERATIVE DISORDER: Primary | ICD-10-CM

## 2022-09-27 LAB
BASOPHILS # BLD AUTO: 0.3 10*3/MM3 (ref 0–0.2)
BASOPHILS NFR BLD AUTO: 0.9 % (ref 0–1.5)
DEPRECATED RDW RBC AUTO: 57.6 FL (ref 37–54)
EOSINOPHIL # BLD AUTO: 0.33 10*3/MM3 (ref 0–0.4)
EOSINOPHIL NFR BLD AUTO: 1 % (ref 0.3–6.2)
ERYTHROCYTE [DISTWIDTH] IN BLOOD BY AUTOMATED COUNT: 15.6 % (ref 12.3–15.4)
HCT VFR BLD AUTO: 30.3 % (ref 34–46.6)
HGB BLD-MCNC: 10.1 G/DL (ref 12–15.9)
IMM GRANULOCYTES # BLD AUTO: 2.39 10*3/MM3 (ref 0–0.05)
IMM GRANULOCYTES NFR BLD AUTO: 7 % (ref 0–0.5)
LYMPHOCYTES # BLD AUTO: 4.36 10*3/MM3 (ref 0.7–3.1)
LYMPHOCYTES NFR BLD AUTO: 12.7 % (ref 19.6–45.3)
MCH RBC QN AUTO: 34.5 PG (ref 26.6–33)
MCHC RBC AUTO-ENTMCNC: 33.3 G/DL (ref 31.5–35.7)
MCV RBC AUTO: 103.4 FL (ref 79–97)
MONOCYTES # BLD AUTO: 4.49 10*3/MM3 (ref 0.1–0.9)
MONOCYTES NFR BLD AUTO: 13.1 % (ref 5–12)
NEUTROPHILS NFR BLD AUTO: 22.45 10*3/MM3 (ref 1.7–7)
NEUTROPHILS NFR BLD AUTO: 65.3 % (ref 42.7–76)
NRBC BLD AUTO-RTO: 0.1 /100 WBC (ref 0–0.2)
PLATELET # BLD AUTO: 210 10*3/MM3 (ref 140–450)
PMV BLD AUTO: 11.5 FL (ref 6–12)
RBC # BLD AUTO: 2.93 10*6/MM3 (ref 3.77–5.28)
WBC NRBC COR # BLD: 34.32 10*3/MM3 (ref 3.4–10.8)

## 2022-09-27 PROCEDURE — 99214 OFFICE O/P EST MOD 30 MIN: CPT | Performed by: INTERNAL MEDICINE

## 2022-09-27 PROCEDURE — 36415 COLL VENOUS BLD VENIPUNCTURE: CPT

## 2022-09-27 PROCEDURE — 85025 COMPLETE CBC W/AUTO DIFF WBC: CPT

## 2022-09-28 ENCOUNTER — SPECIALTY PHARMACY (OUTPATIENT)
Dept: ONCOLOGY | Facility: CLINIC | Age: 82
End: 2022-09-28

## 2022-09-28 DIAGNOSIS — D75.81 MYELOFIBROSIS: ICD-10-CM

## 2022-09-28 RX ORDER — RUXOLITINIB 15 MG/1
15 TABLET ORAL 2 TIMES DAILY
Qty: 60 TABLET | Refills: 3 | Status: SHIPPED | OUTPATIENT
Start: 2022-09-28 | End: 2023-02-27 | Stop reason: SDUPTHER

## 2022-09-28 NOTE — PROGRESS NOTES
Specialty Pharmacy Refill Coordination Note     Catherine is a 82 y.o. female contacted today regarding refills of Jakafi specialty medication(s).    Reviewed and verified with patient:       Specialty medication(s) and dose(s) confirmed: yes  Jakafi 15 mg twice per day    Refill Questions    Flowsheet Row Most Recent Value   Changes to allergies? No   Changes to medications? No   New conditions since last clinic visit No   Unplanned office visit, urgent care, ED, or hospital admission in the last 4 weeks  Yes  [ER visit and hospital stay 8/31]   How does patient/caregiver feel medication is working? Good   Financial problems or insurance changes  No   Since the previous refill, were any specialty medication doses or scheduled injections missed or delayed?  No   Does this patient require a clinical escalation to a pharmacist? No          Delivery Questions    Flowsheet Row Most Recent Value   Delivery method Other (Comment)  [[employer] to home address on 9/30/22-$0 copay-Address Confirmed]   Delivery address correct? Yes  [Ship to home address]   Delivery phone number [phone]   Preferred delivery time? AM   Number of medications in delivery 1   Medication being filled and delivered Jakafi   Doses left of specialty medications 7 days   Is there any medication that is due not being filled? No   Supplies needed? No supplies needed   Cooler needed? No   Do any medications need mixed or dated? No   Copay form of payment Payment plan already set up   Additional comments $0 copay-Insurance pays 100%   Questions or concerns for the pharmacist? No   Explain any questions or concerns for the pharmacist N/A   Are any medications first time fills? No        Jakafi delivery coordinated with pts daughter, Ila, for 9/30/22 to pts address via [employer]. $0 copay with insurance. Her last delivery was on 8/31/22. No questions or concerns to report to MTM Team today.     Follow-up: 21 day(s)     Isi Mao  Specialty  Pharmacy Technician

## 2022-10-06 NOTE — PROGRESS NOTES
10/07/22 0001   Pre-Procedure Phone Call   Procedure Time Verified Yes   Arrival Time 0730   Procedure Location Verified Yes   Medical History Reviewed No   NPO Status Reinforced Yes   Ride and Caregiver Arranged Yes   Phone Number for Ride/Caregiver Ila (daughter)   Patient Knows to Bring Current Medications No  (No changes in current medications)   Bring Outside Films Requested No

## 2022-10-07 ENCOUNTER — HOSPITAL ENCOUNTER (OUTPATIENT)
Dept: CT IMAGING | Facility: HOSPITAL | Age: 82
Discharge: HOME | End: 2022-10-07
Admitting: RADIOLOGY

## 2022-10-07 VITALS
HEART RATE: 92 BPM | WEIGHT: 157 LBS | HEIGHT: 61 IN | DIASTOLIC BLOOD PRESSURE: 65 MMHG | SYSTOLIC BLOOD PRESSURE: 124 MMHG | BODY MASS INDEX: 29.64 KG/M2 | RESPIRATION RATE: 18 BRPM | OXYGEN SATURATION: 91 % | TEMPERATURE: 98.2 F

## 2022-10-07 DIAGNOSIS — D47.1 MYELOPROLIFERATIVE DISORDER: ICD-10-CM

## 2022-10-07 PROCEDURE — 88184 FLOWCYTOMETRY/ TC 1 MARKER: CPT

## 2022-10-07 PROCEDURE — 88305 TISSUE EXAM BY PATHOLOGIST: CPT | Performed by: INTERNAL MEDICINE

## 2022-10-07 PROCEDURE — 88185 FLOWCYTOMETRY/TC ADD-ON: CPT

## 2022-10-07 PROCEDURE — 88237 TISSUE CULTURE BONE MARROW: CPT

## 2022-10-07 PROCEDURE — 25010000002 FENTANYL CITRATE (PF) 50 MCG/ML SOLUTION: Performed by: RADIOLOGY

## 2022-10-07 PROCEDURE — 88300 SURGICAL PATH GROSS: CPT | Performed by: INTERNAL MEDICINE

## 2022-10-07 PROCEDURE — 88341 IMHCHEM/IMCYTCHM EA ADD ANTB: CPT

## 2022-10-07 PROCEDURE — 88341 IMHCHEM/IMCYTCHM EA ADD ANTB: CPT | Performed by: INTERNAL MEDICINE

## 2022-10-07 PROCEDURE — 88313 SPECIAL STAINS GROUP 2: CPT | Performed by: INTERNAL MEDICINE

## 2022-10-07 PROCEDURE — 25010000002 MIDAZOLAM PER 1 MG: Performed by: RADIOLOGY

## 2022-10-07 PROCEDURE — 88342 IMHCHEM/IMCYTCHM 1ST ANTB: CPT | Performed by: INTERNAL MEDICINE

## 2022-10-07 PROCEDURE — 0 LIDOCAINE 1 % SOLUTION: Performed by: RADIOLOGY

## 2022-10-07 PROCEDURE — 88313 SPECIAL STAINS GROUP 2: CPT

## 2022-10-07 PROCEDURE — 77012 CT SCAN FOR NEEDLE BIOPSY: CPT

## 2022-10-07 PROCEDURE — 88311 DECALCIFY TISSUE: CPT | Performed by: INTERNAL MEDICINE

## 2022-10-07 PROCEDURE — 88182 CELL MARKER STUDY: CPT

## 2022-10-07 PROCEDURE — 99152 MOD SED SAME PHYS/QHP 5/>YRS: CPT

## 2022-10-07 PROCEDURE — 88264 CHROMOSOME ANALYSIS 20-25: CPT

## 2022-10-07 RX ORDER — MIDAZOLAM HYDROCHLORIDE 1 MG/ML
0.5 INJECTION INTRAMUSCULAR; INTRAVENOUS ONCE AS NEEDED
Status: DISCONTINUED | OUTPATIENT
Start: 2022-10-07 | End: 2022-10-09 | Stop reason: HOSPADM

## 2022-10-07 RX ORDER — SODIUM CHLORIDE 0.9 % (FLUSH) 0.9 %
10 SYRINGE (ML) INJECTION AS NEEDED
Status: DISCONTINUED | OUTPATIENT
Start: 2022-10-07 | End: 2022-10-09 | Stop reason: HOSPADM

## 2022-10-07 RX ORDER — MIDAZOLAM HYDROCHLORIDE 1 MG/ML
INJECTION INTRAMUSCULAR; INTRAVENOUS
Status: COMPLETED | OUTPATIENT
Start: 2022-10-07 | End: 2022-10-07

## 2022-10-07 RX ORDER — SODIUM CHLORIDE 9 MG/ML
100 INJECTION, SOLUTION INTRAVENOUS CONTINUOUS
Status: DISCONTINUED | OUTPATIENT
Start: 2022-10-07 | End: 2022-10-09 | Stop reason: HOSPADM

## 2022-10-07 RX ORDER — LIDOCAINE HYDROCHLORIDE 10 MG/ML
20 INJECTION, SOLUTION INFILTRATION; PERINEURAL ONCE
Status: COMPLETED | OUTPATIENT
Start: 2022-10-07 | End: 2022-10-07

## 2022-10-07 RX ORDER — SODIUM CHLORIDE 0.9 % (FLUSH) 0.9 %
3 SYRINGE (ML) INJECTION EVERY 12 HOURS SCHEDULED
Status: DISCONTINUED | OUTPATIENT
Start: 2022-10-07 | End: 2022-10-09 | Stop reason: HOSPADM

## 2022-10-07 RX ORDER — FENTANYL CITRATE 50 UG/ML
INJECTION, SOLUTION INTRAMUSCULAR; INTRAVENOUS
Status: COMPLETED | OUTPATIENT
Start: 2022-10-07 | End: 2022-10-07

## 2022-10-07 RX ORDER — CYCLOBENZAPRINE HCL 5 MG
5 TABLET ORAL EVERY 8 HOURS PRN
COMMUNITY
Start: 2022-10-05 | End: 2023-01-23

## 2022-10-07 RX ADMIN — MIDAZOLAM 0.5 MG: 1 INJECTION INTRAMUSCULAR; INTRAVENOUS at 08:47

## 2022-10-07 RX ADMIN — SODIUM CHLORIDE 100 ML/HR: 9 INJECTION, SOLUTION INTRAVENOUS at 08:41

## 2022-10-07 RX ADMIN — FENTANYL CITRATE 50 MCG: 0.05 INJECTION, SOLUTION INTRAMUSCULAR; INTRAVENOUS at 08:43

## 2022-10-07 RX ADMIN — FENTANYL CITRATE 25 MCG: 0.05 INJECTION, SOLUTION INTRAMUSCULAR; INTRAVENOUS at 08:48

## 2022-10-07 RX ADMIN — MIDAZOLAM 1 MG: 1 INJECTION INTRAMUSCULAR; INTRAVENOUS at 08:43

## 2022-10-07 RX ADMIN — Medication 3 ML: at 08:01

## 2022-10-07 RX ADMIN — LIDOCAINE HYDROCHLORIDE 20 ML: 10 INJECTION, SOLUTION INFILTRATION; PERINEURAL at 08:49

## 2022-10-07 RX ADMIN — SODIUM CHLORIDE 100 ML/HR: 9 INJECTION, SOLUTION INTRAVENOUS at 09:15

## 2022-10-07 NOTE — NURSING NOTE
Pt arrived to Radiology triage [place] 2 for CT guided Bone Marrow aspiration and biopsy.   Pt wearing a mask as well as this RN for any bedside care.

## 2022-10-07 NOTE — DISCHARGE INSTRUCTIONS
EDUCATION /DISCHARGE INSTRUCTIONS  CT/US guided biopsy:  A biopsy is a procedure done to remove tissue for further analysis.  Before images are taken to locate the target area.  Images can be obtained using ultrasound, CT or MRI.  A physician will clean your skin with antiseptic soap, place a sterile towel around the site and administer a local anesthetic to numb the area.  The physician will then insert a special needle.  Sometimes images are taken of the needle after it is inserted to ensure the needle is in the correct area to be biopsied.   A sample is obtained and sent to the laboratory for study.  Occasionally the laboratory is unable to make a diagnosis from the sample and the procedure may need to be repeated.  Within a week the radiologist will send a report to your physician.  A pathologist will also examine the tissue and send a report.    Risks of the procedure include but are not limited to:   *  Bleeding    *  Infection   *  Puncture of surrounding organs *  Death     *  Lung collapse if the biopsy is near the chest which may require insertion of a      chest tube to re-inflate the lung if severe.    Benefits of the procedure:  Using x-ray helps to locate the area that requires a biopsy. The procedure is less invasive than a surgical procedure, there are no large incisions and it does not require anesthesia.    Alternatives to the procedure:  A biopsy can be performed surgically.  Risks of a surgical biopsy include exposure to anesthesia, infection, excessive bleeding and injury to abdominal organs.  A benefit of surgical biopsy is the ability to see the area to be biopsied and remove of a larger piece of tissue.    THIS EDUCATION INFORMATION WAS REVIEWED PRIOR TO PROCEDURE AND CONSENT. Patient initials__________________Time___________________    Post Procedure:    *  Expect the biopsy site may be tender up to one week.    *  Rest today (no pushing pulling or straining).   *  Slowly increase activity  tomorrow.    *  If you received sedation do not drive for 24 hours.   *  Keep dressing clean and dry.   *  Leave dressing on puncture site for 24 hours.    *  You may shower when dressing removed.  Call your doctor if experiencing:   *  Signs of infection such as redness, swelling, excessive pain and / or foul        smelling drainage from the puncture site.   *  Chills or fever over 101 degrees (by mouth).   *  Unrelieved pain.   *  Any new or severe symptoms.   *  If experiencing sudden / severe shortness of breath or chest pain go to the       nearest emergency room.   Following the procedure:     Follow-up with the ordering physician as directed.    Continue to take other medications as directed by your physician unless    otherwise instructed.   If applicable, resume taking your Eliquis on today.    If you have any concerns please call the Radiology Nurses Desk at [phone].  You are the most important factor in your recovery.  Follow the above instructions carefully.        Discharge Instructions after receiving Moderate Sedation  These instructions provide you with information about caring for yourself after your procedure. Your health care provider may also give you more specific instructions. Your treatment has been planned according to current medical practices, but problems sometimes occur. Call your physican or the Radiology Nurses ([phone]) if you have any problems or questions after your procedure.    What can I expect after the procedure?  After your procedure, you may:  Feel sleepy or light headed for several hours.  Feel clumsy, dizzy or have poor balance for several hours.  Feel forgetful about what happened after the procedure.  Have poor judgment for several hours.  Feel nauseous or vomit.    For at least 24 hours after the procedure:  Have a responsible adult stay with you or frequently check on you until you are awake and alert.   Rest as needed.    Do not:  Participate in activities  in which you could fall or become injured.  Drive or operate heavy machinery  Take sleeping pills or medicines that cause drowsiness.  Make important decisions or sign legal documents.  Take care of children on your own.    Eating and drinking: Clear liquids then progress slowly to a normal diet.  If you vomit, drink water, juice, or soup when you can drink without vomiting.  Make sure you have little or no nausea before eating solid foods.    General instructions: Take over-the-counter and prescription medicines only as told by your health care provider .If you have sleep apnea, surgery and certain medicines can increase your risk for breathing problems. Follow instructions from your health care provider about wearing your sleep device: If you smoke, do not smoke without supervision.  Keep all follow-up visits as told by your health care provider. This is important.    Contact your physician if:  You continue to keep feeling nauseous or you keep vomiting. You continue to feel light-headed, develop a fever or a rash.  Get help right away if you have trouble breathing    I acknowledge the above instructions:    Patient/ Responsible person _________________________________Date ____________Time ____________    Witnessed by____________________________________________ Date_____________ Time____________

## 2022-10-07 NOTE — POST-PROCEDURE NOTE
POST PROCEDURE NOTE    Procedure: bone marrow biopsy    Pre-Procedure Diagnosis: myeloproliferative disorder    Post-procedure Diagnosis: same    Findings: technically successful ct guided bone marrow biopsy    Complications: no immediate    Blood loss: none    Specimen Removed: bone marrow core and aspirate    Disposition:   Discharge home

## 2022-10-24 NOTE — PROGRESS NOTES
"[facility] CBC GROUP OUTPATIENT FOLLOW UP CLINIC VISIT    REASON FOR FOLLOW-UP:    Myelofibrosis, on Jakafi    HISTORY OF PRESENT ILLNESS:  Catherine Boyer is a 82 y.o. female who returns today for follow up of the above issue.      She returns today to follow-up her bone marrow aspiration and biopsy performed on 10/7/2022.    She feels well.  She had a mild upper respiratory infection within the past couple of weeks.  She has recovered nicely from this.  She reports that both of her ears are \"stopped up.\"  She is using some eardrops for these.  She continues Jakafi and tolerates this well.      REVIEW OF SYSTEMS:  As per HPI    PHYSICAL EXAMINATION:    Vitals:    10/26/22 0834   BP: 112/68   Pulse: 85   Resp: 18   Temp: 97.1 °F (36.2 °C)   TempSrc: Temporal   SpO2: 95%   Weight: 70.8 kg (156 lb 1.6 oz)   Height: 154.9 cm (60.98\")   PainSc:   6   PainLoc: Back      General:  No acute distress, awake, alert and oriented.  Walks with a cane  Skin:  Warm and dry, no visible rash  HEENT:  Normocephalic/atraumatic.  Wearing a face mask.  Both auditory canals are occluded with cerumen.  Chest:  Normal respiratory effort.  Lungs clear to auscultation bilaterally.  Heart: Regular rate and rhythm  Extremities:  No visible clubbing, cyanosis, or edema  Neuro/psych:  Grossly nonfocal.  Normal mood and affect.      DIAGNOSTIC DATA:  CBC & Differential (10/26/2022 08:18)      IMAGING:  None reviewed    ASSESSMENT:  This is a 82 y.o. female with:    *JAK2 positive myeloproliferative disorder, perhaps primary myelofibrosis  · She has been seen by Dr. Gerard Foreman with [facility] specialists and cancer and blood disorders in [place].      · She was seen there initially on 6/4/2021 with leukocytosis with a white blood cell count of 104,000.  Hemoglobin was normal at 11.6 and platelets were normal at 299,000.  PCR for BCR/ABL and FISH for BCR/ABL were negative.  A bone marrow aspiration was attempted on 6/9/2021 which was unsuccessful.  " She then went to [place] for the summer.  She was admitted to the [facility] between 6/29/2021 and 7/9/2021.  She had a bone marrow aspiration and biopsy performed there on 6/30/2021 showing a hypercellular marrow at greater than 95% with 1% blasts.  Focal 1+ reticulin fibrosis was noted.  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  PCR for BCR/ABL was negative.  JAK2 V617F PCR was positive.  Cytogenetics were normal.  Next generation myeloid disorder profiling of the bone marrow aspirate from 6/30/2021 showed TET2 U3741J and S4021Bha*47 abnormalities and JAK2 V617F.  Therefore she was suspected to have early primary myelofibrosis and she was started on hydroxyurea 1000 mg daily.  Subsequently, hydroxyurea was held.  Blood counts had improved.  She was admitted to the hospital from 2/9 through 2/15/2022 for symptomatic anemia and chest pain.  Her hemoglobin was 5.5.  The white blood cell count was 7.6.  Platelets were 56,000.  Fecal occult blood testing was negative.  She received 3 units of packed red blood cells.  No endoscopy was performed.  A left heart catheterization was performed with a drug-eluting stent placed to the second diagonal on 2/11/2022 and she was discharged from that hospitalization on aspirin 81 mg, Plavix 75 mg, and Eliquis 5 mg twice daily.  Blood counts improved by 3/7/2022 and her white blood cell count was 7.2 with a hemoglobin 11.4 and platelets 295,000.  She did have a bone marrow aspiration and biopsy on 3/7/2022 showing chronic myeloproliferative neoplasm with potentially post ET fibrosis or consideration of primary myelofibrosis.  There was no evidence for myelodysplasia.  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  She also has atrial fibrillation and is anticoagulated with Eliquis.    · Hydroxyurea was discontinued and she was started on ruxolitinib 15 mg twice daily  at her visit on 3/14/2022 with Dr. Gerard Foreman.  · She had CT imaging of the chest abdomen pelvis on 3/19/2022 that did not demonstrate any splenomegaly.  Calcified mediastinal lymphadenopathy was noted.  Borderline pretracheal and right hilar lymphadenopathy noted.  Small bilateral pleural effusions with bibasilar atelectasis noted.  · She has moved from [place] to [place] and is seen initially in the office on 5/15/2022.  She tolerates ruxolitinib well.  White blood cell count has decreased from 5.9 from 19.6.  Platelets have normalized at 170,000, down from 464,000.  The hemoglobin has also decreased at 8.6 with an MCV of 98.9.  · 5/26/2022: White blood cell count mildly elevated at 12.4 with a normal platelet count at 216,000.  She tolerates Jakafi very well.   · 6/30/2022: White blood cell count a little higher.  Continue monitoring.  · 9/13/2022: White blood cell count higher at 32,000  · Flow cytometry 9/13/2022 with 0.1% myeloblasts with a typical (normal) phenotype.  Nonspecific monocyte population.  · Bone marrow aspiration and biopsy on 10/7/2022 with hypercellular marrow at 95% with involvement by chronic myeloproliferative neoplasm, less than 5% blasts.  Mild reticulin fibrosis.  Decreased iron stores.  Consideration of CMML.  Flow cytometry showed a dim CD56 positive aberrant monocyte population at 8.2 percent of events.  Cytogenetics pending.  NGS pending.  · 10/26/2022: White blood cell count improved at 21,000     *Microcytic anemia  · Hemoglobin on 5/5/2022 was declining at 8.6.  · Ferritin 242 with an iron of 105 and 33% iron saturation, ferritin vitamin B12 1107, folic acid 15.1, normal RBC folate, creatinine 0.86,   · 5/26/2022: Hemoglobin stable at 8.5.  The reticulocyte count is a little bit more elevated.  I am hopeful that the hemoglobin will improve.  If it does not we will need to consider Procrit.  · 6/30/2022: Hemoglobin improving at 9.5 with reticulocyte count of 3%.    · 9/13/2022: Hemoglobin improving at 10.4  · 9/27/2022: Hemoglobin stable at 10.1  · 10/26/2022: Hemoglobin improved at 10.8     *History of marantic endocarditis and atrial fibrillation  · Anticoagulated with Eliquis 5 mg twice daily     *Recent admission with heart failure, positive stress test and LEFTY to a large diagonal branch for in sent stenosis.    *Segment occlusion of both auditory canals: She is using some antibiotic eardrops currently and is going to be using some different eardrops directed at the cerumen in the near future.  She has plans to go back to primary care then for cerumen removal.    PLAN:   1. We await cytogenetics and next generation sequencing on her bone marrow specimen  2. For now, continue ruxolitinib (Jakafi) 15 mg twice daily.  This was initiated prior to her being seen here in the office but we are prescribing this at this point.  3. She continues Eliquis 5 mg twice daily as well as Plavix 75 mg daily.  4. I will plan to see her back in 5 to 6 weeks for follow-up with labs.    High risk medication requiring intensive monitoring

## 2022-10-26 ENCOUNTER — OFFICE VISIT (OUTPATIENT)
Dept: ONCOLOGY | Facility: CLINIC | Age: 82
End: 2022-10-26

## 2022-10-26 ENCOUNTER — LAB (OUTPATIENT)
Dept: LAB | Facility: HOSPITAL | Age: 82
End: 2022-10-26

## 2022-10-26 VITALS
HEART RATE: 85 BPM | OXYGEN SATURATION: 95 % | RESPIRATION RATE: 18 BRPM | DIASTOLIC BLOOD PRESSURE: 68 MMHG | TEMPERATURE: 97.1 F | HEIGHT: 61 IN | BODY MASS INDEX: 29.47 KG/M2 | WEIGHT: 156.1 LBS | SYSTOLIC BLOOD PRESSURE: 112 MMHG

## 2022-10-26 DIAGNOSIS — D47.1 MYELOPROLIFERATIVE DISORDER: Primary | ICD-10-CM

## 2022-10-26 DIAGNOSIS — D50.9 MICROCYTIC ANEMIA: ICD-10-CM

## 2022-10-26 DIAGNOSIS — D47.1 MYELOPROLIFERATIVE DISORDER: ICD-10-CM

## 2022-10-26 LAB
ALBUMIN SERPL-MCNC: 4.6 G/DL (ref 3.5–5.2)
ALBUMIN/GLOB SERPL: 1.6 G/DL (ref 1.1–2.4)
ALP SERPL-CCNC: 116 U/L (ref 38–116)
ALT SERPL W P-5'-P-CCNC: 29 U/L (ref 0–33)
ANION GAP SERPL CALCULATED.3IONS-SCNC: 16.2 MMOL/L (ref 5–15)
AST SERPL-CCNC: 25 U/L (ref 0–32)
BASOPHILS # BLD AUTO: 0.19 10*3/MM3 (ref 0–0.2)
BASOPHILS NFR BLD AUTO: 0.9 % (ref 0–1.5)
BILIRUB SERPL-MCNC: 0.4 MG/DL (ref 0.2–1.2)
BUN SERPL-MCNC: 27 MG/DL (ref 6–20)
BUN/CREAT SERPL: 25.7 (ref 7.3–30)
CALCIUM SPEC-SCNC: 9.3 MG/DL (ref 8.5–10.2)
CHLORIDE SERPL-SCNC: 100 MMOL/L (ref 98–107)
CO2 SERPL-SCNC: 22.8 MMOL/L (ref 22–29)
CREAT SERPL-MCNC: 1.05 MG/DL (ref 0.6–1.1)
DEPRECATED RDW RBC AUTO: 65.2 FL (ref 37–54)
EGFRCR SERPLBLD CKD-EPI 2021: 52.5 ML/MIN/1.73
EOSINOPHIL # BLD AUTO: 0.23 10*3/MM3 (ref 0–0.4)
EOSINOPHIL NFR BLD AUTO: 1.1 % (ref 0.3–6.2)
ERYTHROCYTE [DISTWIDTH] IN BLOOD BY AUTOMATED COUNT: 16.9 % (ref 12.3–15.4)
GLOBULIN UR ELPH-MCNC: 2.9 GM/DL (ref 1.8–3.5)
GLUCOSE SERPL-MCNC: 161 MG/DL (ref 74–124)
HCT VFR BLD AUTO: 34.4 % (ref 34–46.6)
HGB BLD-MCNC: 10.8 G/DL (ref 12–15.9)
IMM GRANULOCYTES # BLD AUTO: 1.54 10*3/MM3 (ref 0–0.05)
IMM GRANULOCYTES NFR BLD AUTO: 7.1 % (ref 0–0.5)
LYMPHOCYTES # BLD AUTO: 3.42 10*3/MM3 (ref 0.7–3.1)
LYMPHOCYTES NFR BLD AUTO: 15.9 % (ref 19.6–45.3)
MCH RBC QN AUTO: 33.1 PG (ref 26.6–33)
MCHC RBC AUTO-ENTMCNC: 31.4 G/DL (ref 31.5–35.7)
MCV RBC AUTO: 105.5 FL (ref 79–97)
MONOCYTES # BLD AUTO: 4.02 10*3/MM3 (ref 0.1–0.9)
MONOCYTES NFR BLD AUTO: 18.7 % (ref 5–12)
NEUTROPHILS NFR BLD AUTO: 12.14 10*3/MM3 (ref 1.7–7)
NEUTROPHILS NFR BLD AUTO: 56.3 % (ref 42.7–76)
NRBC BLD AUTO-RTO: 0.1 /100 WBC (ref 0–0.2)
PLATELET # BLD AUTO: 187 10*3/MM3 (ref 140–450)
PMV BLD AUTO: 11.7 FL (ref 6–12)
POTASSIUM SERPL-SCNC: 4.7 MMOL/L (ref 3.5–4.7)
PROT SERPL-MCNC: 7.5 G/DL (ref 6.3–8)
RBC # BLD AUTO: 3.26 10*6/MM3 (ref 3.77–5.28)
SODIUM SERPL-SCNC: 139 MMOL/L (ref 134–145)
WBC NRBC COR # BLD: 21.54 10*3/MM3 (ref 3.4–10.8)

## 2022-10-26 PROCEDURE — 85025 COMPLETE CBC W/AUTO DIFF WBC: CPT

## 2022-10-26 PROCEDURE — 80053 COMPREHEN METABOLIC PANEL: CPT

## 2022-10-26 PROCEDURE — 99214 OFFICE O/P EST MOD 30 MIN: CPT | Performed by: INTERNAL MEDICINE

## 2022-10-26 PROCEDURE — 36415 COLL VENOUS BLD VENIPUNCTURE: CPT

## 2022-10-26 RX ORDER — ADHESIVE BANDAGE 3/4"
BANDAGE TOPICAL
COMMUNITY
Start: 2022-10-20 | End: 2022-11-21

## 2022-10-26 RX ORDER — OFLOXACIN 3 MG/ML
SOLUTION AURICULAR (OTIC)
COMMUNITY
Start: 2022-10-21 | End: 2022-11-21

## 2022-10-27 ENCOUNTER — SPECIALTY PHARMACY (OUTPATIENT)
Dept: ONCOLOGY | Facility: CLINIC | Age: 82
End: 2022-10-27

## 2022-10-27 NOTE — PROGRESS NOTES
Specialty Pharmacy Refill Coordination Note     Catherine is a 82 y.o. female contacted today regarding refills of Jakafi specialty medication(s).    Reviewed and verified with patient:       Specialty medication(s) and dose(s) confirmed: yes  Jakafi 15 mg twice per day.    Refill Questions    Flowsheet Row Most Recent Value   Changes to allergies? No   Changes to medications? No   New conditions since last clinic visit No   Unplanned office visit, urgent care, ED, or hospital admission in the last 4 weeks  No   How does patient/caregiver feel medication is working? Good   Financial problems or insurance changes  No   Since the previous refill, were any specialty medication doses or scheduled injections missed or delayed?  No   Does this patient require a clinical escalation to a pharmacist? No          Delivery Questions    Flowsheet Row Most Recent Value   Delivery method Other (Comment)  [[employer] to home address on 10/28-$0 copay-Address Confirmed]   Delivery address correct? Yes   Delivery phone number [phone]   Preferred delivery time? AM   Number of medications in delivery 1   Medication being filled and delivered Jakafi   Doses left of specialty medications Ila was not sure   Is there any medication that is due not being filled? No   Supplies needed? No supplies needed   Cooler needed? No   Do any medications need mixed or dated? No   Copay form of payment Payment plan already set up   Additional comments $0 copay-Insurance pays 100%   Questions or concerns for the pharmacist? No   Explain any questions or concerns for the pharmacist N/A   Are any medications first time fills? No        Jakafi delivery coordinated with Ila, pts daughter, for 10/28/22 via [employer] to pts home address. $0 copay with insurance paying 100%. Her last delivery was on 9/30/2022. No questions or concerns to report to MTM Team today.     Follow-up: 21 day(s)     Isi Mao  Specialty Pharmacy Technician

## 2022-10-29 ENCOUNTER — HOSPITAL ENCOUNTER (INPATIENT)
Facility: HOSPITAL | Age: 82
LOS: 2 days | Discharge: HOME HEALTH | End: 2022-11-01
Attending: EMERGENCY MEDICINE | Admitting: INTERNAL MEDICINE

## 2022-10-29 ENCOUNTER — APPOINTMENT (OUTPATIENT)
Dept: GENERAL RADIOLOGY | Facility: HOSPITAL | Age: 82
End: 2022-10-29

## 2022-10-29 ENCOUNTER — APPOINTMENT (OUTPATIENT)
Dept: CT IMAGING | Facility: HOSPITAL | Age: 82
End: 2022-10-29

## 2022-10-29 DIAGNOSIS — R50.9 FEVER IN ADULT: ICD-10-CM

## 2022-10-29 DIAGNOSIS — D47.1 MYELOPROLIFERATIVE DISEASE: ICD-10-CM

## 2022-10-29 DIAGNOSIS — U07.1 COVID: Primary | ICD-10-CM

## 2022-10-29 DIAGNOSIS — K82.8 PORCELAIN GALLBLADDER: ICD-10-CM

## 2022-10-29 DIAGNOSIS — R10.13 EPIGASTRIC PAIN: ICD-10-CM

## 2022-10-29 DIAGNOSIS — R79.89 ELEVATED LACTIC ACID LEVEL: ICD-10-CM

## 2022-10-29 LAB
ALBUMIN SERPL-MCNC: 4.1 G/DL (ref 3.5–5.2)
ALBUMIN SERPL-MCNC: 4.7 G/DL (ref 3.5–5.2)
ALBUMIN/GLOB SERPL: 1.7 G/DL
ALP SERPL-CCNC: 111 U/L (ref 39–117)
ALP SERPL-CCNC: 120 U/L (ref 39–117)
ALT SERPL W P-5'-P-CCNC: 26 U/L (ref 1–33)
ALT SERPL W P-5'-P-CCNC: 27 U/L (ref 1–33)
ANION GAP SERPL CALCULATED.3IONS-SCNC: 14.7 MMOL/L (ref 5–15)
ANISOCYTOSIS BLD QL: ABNORMAL
AST SERPL-CCNC: 27 U/L (ref 1–32)
AST SERPL-CCNC: 28 U/L (ref 1–32)
BASOPHILS # BLD MANUAL: 0.15 10*3/MM3 (ref 0–0.2)
BASOPHILS NFR BLD MANUAL: 0.8 % (ref 0–1.5)
BILIRUB CONJ SERPL-MCNC: 0.2 MG/DL (ref 0–0.3)
BILIRUB INDIRECT SERPL-MCNC: 0.2 MG/DL
BILIRUB SERPL-MCNC: 0.4 MG/DL (ref 0–1.2)
BILIRUB SERPL-MCNC: 0.4 MG/DL (ref 0–1.2)
BUN SERPL-MCNC: 21 MG/DL (ref 8–23)
BUN/CREAT SERPL: 21.6 (ref 7–25)
CALCIUM SPEC-SCNC: 9.2 MG/DL (ref 8.6–10.5)
CHLORIDE SERPL-SCNC: 96 MMOL/L (ref 98–107)
CO2 SERPL-SCNC: 23.3 MMOL/L (ref 22–29)
CREAT SERPL-MCNC: 0.97 MG/DL (ref 0.57–1)
CREAT SERPL-MCNC: 1 MG/DL (ref 0.57–1)
D-LACTATE SERPL-SCNC: 1.9 MMOL/L (ref 0.5–2)
D-LACTATE SERPL-SCNC: 3 MMOL/L (ref 0.5–2)
DEPRECATED RDW RBC AUTO: 59.1 FL (ref 37–54)
EGFRCR SERPLBLD CKD-EPI 2021: 55.7 ML/MIN/1.73
EGFRCR SERPLBLD CKD-EPI 2021: 57.7 ML/MIN/1.73
EOSINOPHIL # BLD MANUAL: 0.15 10*3/MM3 (ref 0–0.4)
EOSINOPHIL NFR BLD MANUAL: 0.8 % (ref 0.3–6.2)
ERYTHROCYTE [DISTWIDTH] IN BLOOD BY AUTOMATED COUNT: 16.2 % (ref 12.3–15.4)
FLUAV SUBTYP SPEC NAA+PROBE: NOT DETECTED
FLUBV RNA ISLT QL NAA+PROBE: NOT DETECTED
GLOBULIN UR ELPH-MCNC: 2.8 GM/DL
GLUCOSE BLDC GLUCOMTR-MCNC: 109 MG/DL (ref 70–130)
GLUCOSE SERPL-MCNC: 137 MG/DL (ref 65–99)
HCT VFR BLD AUTO: 31.8 % (ref 34–46.6)
HGB BLD-MCNC: 10.6 G/DL (ref 12–15.9)
LYMPHOCYTES # BLD MANUAL: 1.96 10*3/MM3 (ref 0.7–3.1)
LYMPHOCYTES NFR BLD MANUAL: 11.4 % (ref 5–12)
MAGNESIUM SERPL-MCNC: 1.9 MG/DL (ref 1.6–2.4)
MCH RBC QN AUTO: 33.3 PG (ref 26.6–33)
MCHC RBC AUTO-ENTMCNC: 33.3 G/DL (ref 31.5–35.7)
MCV RBC AUTO: 100 FL (ref 79–97)
METAMYELOCYTES NFR BLD MANUAL: 0.4 % (ref 0–0)
MONOCYTES # BLD: 2.11 10*3/MM3 (ref 0.1–0.9)
MYELOCYTES NFR BLD MANUAL: 0.8 % (ref 0–0)
NEUTROPHILS # BLD AUTO: 13.91 10*3/MM3 (ref 1.7–7)
NEUTROPHILS NFR BLD MANUAL: 75.1 % (ref 42.7–76)
NRBC BLD AUTO-RTO: 0 /100 WBC (ref 0–0.2)
NT-PROBNP SERPL-MCNC: 1106 PG/ML (ref 0–1800)
PLAT MORPH BLD: NORMAL
PLATELET # BLD AUTO: 181 10*3/MM3 (ref 140–450)
PMV BLD AUTO: 12 FL (ref 6–12)
POTASSIUM SERPL-SCNC: 4 MMOL/L (ref 3.5–5.2)
PROCALCITONIN SERPL-MCNC: 0.18 NG/ML (ref 0–0.25)
PROT SERPL-MCNC: 7.1 G/DL (ref 6–8.5)
PROT SERPL-MCNC: 7.5 G/DL (ref 6–8.5)
QT INTERVAL: 355 MS
RBC # BLD AUTO: 3.18 10*6/MM3 (ref 3.77–5.28)
SARS-COV-2 RNA PNL SPEC NAA+PROBE: DETECTED
SODIUM SERPL-SCNC: 134 MMOL/L (ref 136–145)
TROPONIN T SERPL-MCNC: 0.01 NG/ML (ref 0–0.03)
VARIANT LYMPHS NFR BLD MANUAL: 0.4 % (ref 0–5)
VARIANT LYMPHS NFR BLD MANUAL: 10.2 % (ref 19.6–45.3)
WBC MORPH BLD: NORMAL
WBC NRBC COR # BLD: 18.52 10*3/MM3 (ref 3.4–10.8)

## 2022-10-29 PROCEDURE — 3E0DX3Z INTRODUCTION OF ANTI-INFLAMMATORY INTO MOUTH AND PHARYNX, EXTERNAL APPROACH: ICD-10-PCS | Performed by: EMERGENCY MEDICINE

## 2022-10-29 PROCEDURE — 83880 ASSAY OF NATRIURETIC PEPTIDE: CPT | Performed by: EMERGENCY MEDICINE

## 2022-10-29 PROCEDURE — 87636 SARSCOV2 & INF A&B AMP PRB: CPT | Performed by: EMERGENCY MEDICINE

## 2022-10-29 PROCEDURE — 74177 CT ABD & PELVIS W/CONTRAST: CPT

## 2022-10-29 PROCEDURE — 83735 ASSAY OF MAGNESIUM: CPT | Performed by: EMERGENCY MEDICINE

## 2022-10-29 PROCEDURE — 83605 ASSAY OF LACTIC ACID: CPT | Performed by: EMERGENCY MEDICINE

## 2022-10-29 PROCEDURE — G0378 HOSPITAL OBSERVATION PER HR: HCPCS

## 2022-10-29 PROCEDURE — 93010 ELECTROCARDIOGRAM REPORT: CPT | Performed by: INTERNAL MEDICINE

## 2022-10-29 PROCEDURE — 85007 BL SMEAR W/DIFF WBC COUNT: CPT | Performed by: EMERGENCY MEDICINE

## 2022-10-29 PROCEDURE — 84484 ASSAY OF TROPONIN QUANT: CPT | Performed by: EMERGENCY MEDICINE

## 2022-10-29 PROCEDURE — 36415 COLL VENOUS BLD VENIPUNCTURE: CPT | Performed by: EMERGENCY MEDICINE

## 2022-10-29 PROCEDURE — 82565 ASSAY OF CREATININE: CPT | Performed by: INTERNAL MEDICINE

## 2022-10-29 PROCEDURE — 82248 BILIRUBIN DIRECT: CPT | Performed by: EMERGENCY MEDICINE

## 2022-10-29 PROCEDURE — 84145 PROCALCITONIN (PCT): CPT | Performed by: EMERGENCY MEDICINE

## 2022-10-29 PROCEDURE — 71045 X-RAY EXAM CHEST 1 VIEW: CPT

## 2022-10-29 PROCEDURE — 99285 EMERGENCY DEPT VISIT HI MDM: CPT

## 2022-10-29 PROCEDURE — XW033E5 INTRODUCTION OF REMDESIVIR ANTI-INFECTIVE INTO PERIPHERAL VEIN, PERCUTANEOUS APPROACH, NEW TECHNOLOGY GROUP 5: ICD-10-PCS | Performed by: INTERNAL MEDICINE

## 2022-10-29 PROCEDURE — 80053 COMPREHEN METABOLIC PANEL: CPT | Performed by: EMERGENCY MEDICINE

## 2022-10-29 PROCEDURE — 25010000002 IOPAMIDOL 61 % SOLUTION: Performed by: EMERGENCY MEDICINE

## 2022-10-29 PROCEDURE — 25010000002 REMDESIVIR 100 MG/20ML SOLUTION 1 EACH VIAL: Performed by: INTERNAL MEDICINE

## 2022-10-29 PROCEDURE — 82962 GLUCOSE BLOOD TEST: CPT

## 2022-10-29 PROCEDURE — 93005 ELECTROCARDIOGRAM TRACING: CPT | Performed by: EMERGENCY MEDICINE

## 2022-10-29 PROCEDURE — 85025 COMPLETE CBC W/AUTO DIFF WBC: CPT | Performed by: EMERGENCY MEDICINE

## 2022-10-29 RX ORDER — DEXTROSE MONOHYDRATE 25 G/50ML
25 INJECTION, SOLUTION INTRAVENOUS
Status: DISCONTINUED | OUTPATIENT
Start: 2022-10-29 | End: 2022-11-01 | Stop reason: HOSPADM

## 2022-10-29 RX ORDER — LATANOPROST 50 UG/ML
1 SOLUTION/ DROPS OPHTHALMIC DAILY
Status: DISCONTINUED | OUTPATIENT
Start: 2022-10-29 | End: 2022-10-29 | Stop reason: SDUPTHER

## 2022-10-29 RX ORDER — NITROGLYCERIN 0.4 MG/1
0.4 TABLET SUBLINGUAL
Status: DISCONTINUED | OUTPATIENT
Start: 2022-10-29 | End: 2022-11-01 | Stop reason: HOSPADM

## 2022-10-29 RX ORDER — CLOPIDOGREL BISULFATE 75 MG/1
75 TABLET ORAL DAILY
Status: DISCONTINUED | OUTPATIENT
Start: 2022-10-29 | End: 2022-11-01 | Stop reason: HOSPADM

## 2022-10-29 RX ORDER — PANTOPRAZOLE SODIUM 40 MG/1
40 TABLET, DELAYED RELEASE ORAL DAILY
Status: DISCONTINUED | OUTPATIENT
Start: 2022-10-29 | End: 2022-11-01 | Stop reason: HOSPADM

## 2022-10-29 RX ORDER — INSULIN LISPRO 100 [IU]/ML
0-9 INJECTION, SOLUTION INTRAVENOUS; SUBCUTANEOUS
Status: DISCONTINUED | OUTPATIENT
Start: 2022-10-30 | End: 2022-10-30

## 2022-10-29 RX ORDER — SODIUM CHLORIDE 0.9 % (FLUSH) 0.9 %
10 SYRINGE (ML) INJECTION EVERY 12 HOURS SCHEDULED
Status: DISCONTINUED | OUTPATIENT
Start: 2022-10-29 | End: 2022-11-01 | Stop reason: HOSPADM

## 2022-10-29 RX ORDER — NICOTINE POLACRILEX 4 MG
15 LOZENGE BUCCAL
Status: DISCONTINUED | OUTPATIENT
Start: 2022-10-29 | End: 2022-11-01 | Stop reason: HOSPADM

## 2022-10-29 RX ORDER — METOPROLOL SUCCINATE 25 MG/1
25 TABLET, EXTENDED RELEASE ORAL 2 TIMES DAILY
Status: DISCONTINUED | OUTPATIENT
Start: 2022-10-29 | End: 2022-11-01 | Stop reason: HOSPADM

## 2022-10-29 RX ORDER — SODIUM CHLORIDE 0.9 % (FLUSH) 0.9 %
10 SYRINGE (ML) INJECTION AS NEEDED
Status: DISCONTINUED | OUTPATIENT
Start: 2022-10-29 | End: 2022-11-01 | Stop reason: HOSPADM

## 2022-10-29 RX ORDER — FUROSEMIDE 20 MG/1
40 TABLET ORAL DAILY
Status: DISCONTINUED | OUTPATIENT
Start: 2022-10-29 | End: 2022-11-01 | Stop reason: HOSPADM

## 2022-10-29 RX ORDER — LATANOPROST 50 UG/ML
1 SOLUTION/ DROPS OPHTHALMIC NIGHTLY
Status: DISCONTINUED | OUTPATIENT
Start: 2022-10-29 | End: 2022-11-01 | Stop reason: HOSPADM

## 2022-10-29 RX ORDER — ACETAMINOPHEN 500 MG
1000 TABLET ORAL ONCE
Status: COMPLETED | OUTPATIENT
Start: 2022-10-29 | End: 2022-10-29

## 2022-10-29 RX ORDER — ACETAMINOPHEN 500 MG
500 TABLET ORAL EVERY 6 HOURS PRN
Status: DISCONTINUED | OUTPATIENT
Start: 2022-10-29 | End: 2022-11-01 | Stop reason: HOSPADM

## 2022-10-29 RX ORDER — ONDANSETRON 2 MG/ML
4 INJECTION INTRAMUSCULAR; INTRAVENOUS EVERY 6 HOURS PRN
Status: DISCONTINUED | OUTPATIENT
Start: 2022-10-29 | End: 2022-11-01 | Stop reason: HOSPADM

## 2022-10-29 RX ORDER — ATORVASTATIN CALCIUM 80 MG/1
80 TABLET, FILM COATED ORAL NIGHTLY
Status: DISCONTINUED | OUTPATIENT
Start: 2022-10-29 | End: 2022-11-01 | Stop reason: HOSPADM

## 2022-10-29 RX ORDER — SODIUM CHLORIDE 9 MG/ML
75 INJECTION, SOLUTION INTRAVENOUS CONTINUOUS
Status: DISCONTINUED | OUTPATIENT
Start: 2022-10-29 | End: 2022-10-30

## 2022-10-29 RX ADMIN — PANTOPRAZOLE SODIUM 40 MG: 40 TABLET, DELAYED RELEASE ORAL at 22:03

## 2022-10-29 RX ADMIN — SODIUM CHLORIDE 500 ML: 9 INJECTION, SOLUTION INTRAVENOUS at 22:05

## 2022-10-29 RX ADMIN — REMDESIVIR 200 MG: 100 INJECTION, POWDER, LYOPHILIZED, FOR SOLUTION INTRAVENOUS at 23:23

## 2022-10-29 RX ADMIN — FUROSEMIDE 40 MG: 20 TABLET ORAL at 22:01

## 2022-10-29 RX ADMIN — Medication 10 ML: at 22:05

## 2022-10-29 RX ADMIN — SERTRALINE HYDROCHLORIDE 50 MG: 50 TABLET, FILM COATED ORAL at 22:02

## 2022-10-29 RX ADMIN — ATORVASTATIN CALCIUM 80 MG: 80 TABLET, FILM COATED ORAL at 22:03

## 2022-10-29 RX ADMIN — EMPAGLIFLOZIN 10 MG: 10 TABLET, FILM COATED ORAL at 22:03

## 2022-10-29 RX ADMIN — IOPAMIDOL 85 ML: 612 INJECTION, SOLUTION INTRAVENOUS at 16:53

## 2022-10-29 RX ADMIN — CLOPIDOGREL 75 MG: 75 TABLET, FILM COATED ORAL at 22:04

## 2022-10-29 RX ADMIN — LATANOPROST 1 DROP: 50 SOLUTION/ DROPS OPHTHALMIC at 22:01

## 2022-10-29 RX ADMIN — SODIUM CHLORIDE 75 ML/HR: 9 INJECTION, SOLUTION INTRAVENOUS at 22:05

## 2022-10-29 RX ADMIN — APIXABAN 5 MG: 5 TABLET, FILM COATED ORAL at 22:02

## 2022-10-29 RX ADMIN — METOPROLOL SUCCINATE 25 MG: 25 TABLET, EXTENDED RELEASE ORAL at 22:03

## 2022-10-29 RX ADMIN — ACETAMINOPHEN 1000 MG: 500 TABLET ORAL at 16:15

## 2022-10-30 LAB
ALBUMIN SERPL-MCNC: 3.9 G/DL (ref 3.5–5.2)
ALBUMIN/GLOB SERPL: 1.4 G/DL
ALP SERPL-CCNC: 104 U/L (ref 39–117)
ALT SERPL W P-5'-P-CCNC: 24 U/L (ref 1–33)
ANION GAP SERPL CALCULATED.3IONS-SCNC: 12.7 MMOL/L (ref 5–15)
ANISOCYTOSIS BLD QL: ABNORMAL
AST SERPL-CCNC: 26 U/L (ref 1–32)
BILIRUB CONJ SERPL-MCNC: <0.2 MG/DL (ref 0–0.3)
BILIRUB SERPL-MCNC: 0.3 MG/DL (ref 0–1.2)
BUN SERPL-MCNC: 16 MG/DL (ref 8–23)
BUN/CREAT SERPL: 19.5 (ref 7–25)
CALCIUM SPEC-SCNC: 8.2 MG/DL (ref 8.6–10.5)
CHLORIDE SERPL-SCNC: 101 MMOL/L (ref 98–107)
CO2 SERPL-SCNC: 22.3 MMOL/L (ref 22–29)
CREAT SERPL-MCNC: 0.82 MG/DL (ref 0.57–1)
D-LACTATE SERPL-SCNC: 2.7 MMOL/L (ref 0.5–2)
DEPRECATED RDW RBC AUTO: 57.9 FL (ref 37–54)
EGFRCR SERPLBLD CKD-EPI 2021: 70.6 ML/MIN/1.73
EOSINOPHIL # BLD MANUAL: 0.38 10*3/MM3 (ref 0–0.4)
EOSINOPHIL NFR BLD MANUAL: 2 % (ref 0.3–6.2)
ERYTHROCYTE [DISTWIDTH] IN BLOOD BY AUTOMATED COUNT: 16.2 % (ref 12.3–15.4)
GLOBULIN UR ELPH-MCNC: 2.8 GM/DL
GLUCOSE BLDC GLUCOMTR-MCNC: 114 MG/DL (ref 70–130)
GLUCOSE BLDC GLUCOMTR-MCNC: 180 MG/DL (ref 70–130)
GLUCOSE BLDC GLUCOMTR-MCNC: 292 MG/DL (ref 70–130)
GLUCOSE SERPL-MCNC: 130 MG/DL (ref 65–99)
HBA1C MFR BLD: 6.8 % (ref 4.8–5.6)
HCT VFR BLD AUTO: 28.7 % (ref 34–46.6)
HGB BLD-MCNC: 9.6 G/DL (ref 12–15.9)
LYMPHOCYTES # BLD MANUAL: 1.5 10*3/MM3 (ref 0.7–3.1)
LYMPHOCYTES NFR BLD MANUAL: 9 % (ref 5–12)
MCH RBC QN AUTO: 33.1 PG (ref 26.6–33)
MCHC RBC AUTO-ENTMCNC: 33.4 G/DL (ref 31.5–35.7)
MCV RBC AUTO: 99 FL (ref 79–97)
METAMYELOCYTES NFR BLD MANUAL: 4 % (ref 0–0)
MONOCYTES # BLD: 1.69 10*3/MM3 (ref 0.1–0.9)
MYELOCYTES NFR BLD MANUAL: 3 % (ref 0–0)
NEUTROPHILS # BLD AUTO: 13.91 10*3/MM3 (ref 1.7–7)
NEUTROPHILS NFR BLD MANUAL: 74 % (ref 42.7–76)
NRBC BLD AUTO-RTO: 0 /100 WBC (ref 0–0.2)
OVALOCYTES BLD QL SMEAR: ABNORMAL
PLAT MORPH BLD: NORMAL
PLATELET # BLD AUTO: 153 10*3/MM3 (ref 140–450)
PMV BLD AUTO: 11.9 FL (ref 6–12)
POTASSIUM SERPL-SCNC: 3.7 MMOL/L (ref 3.5–5.2)
PROCALCITONIN SERPL-MCNC: 0.13 NG/ML (ref 0–0.25)
PROT SERPL-MCNC: 6.7 G/DL (ref 6–8.5)
RBC # BLD AUTO: 2.9 10*6/MM3 (ref 3.77–5.28)
SODIUM SERPL-SCNC: 136 MMOL/L (ref 136–145)
VARIANT LYMPHS NFR BLD MANUAL: 8 % (ref 19.6–45.3)
WBC MORPH BLD: NORMAL
WBC NRBC COR # BLD: 18.8 10*3/MM3 (ref 3.4–10.8)

## 2022-10-30 PROCEDURE — 63710000001 INSULIN LISPRO (HUMAN) PER 5 UNITS: Performed by: HOSPITALIST

## 2022-10-30 PROCEDURE — 82962 GLUCOSE BLOOD TEST: CPT

## 2022-10-30 PROCEDURE — 83605 ASSAY OF LACTIC ACID: CPT | Performed by: INTERNAL MEDICINE

## 2022-10-30 PROCEDURE — 84145 PROCALCITONIN (PCT): CPT | Performed by: INTERNAL MEDICINE

## 2022-10-30 PROCEDURE — C9399 UNCLASSIFIED DRUGS OR BIOLOG: HCPCS | Performed by: INTERNAL MEDICINE

## 2022-10-30 PROCEDURE — 80053 COMPREHEN METABOLIC PANEL: CPT | Performed by: INTERNAL MEDICINE

## 2022-10-30 PROCEDURE — 85025 COMPLETE CBC W/AUTO DIFF WBC: CPT | Performed by: INTERNAL MEDICINE

## 2022-10-30 PROCEDURE — 25010000002 REMDESIVIR 100 MG/20ML SOLUTION 1 EACH VIAL: Performed by: INTERNAL MEDICINE

## 2022-10-30 PROCEDURE — 63710000001 RUXOLITINIB 15 MG TABLET: Performed by: INTERNAL MEDICINE

## 2022-10-30 PROCEDURE — 82248 BILIRUBIN DIRECT: CPT | Performed by: INTERNAL MEDICINE

## 2022-10-30 PROCEDURE — 63710000001 DEXAMETHASONE PER 0.25 MG: Performed by: HOSPITALIST

## 2022-10-30 PROCEDURE — 83036 HEMOGLOBIN GLYCOSYLATED A1C: CPT | Performed by: INTERNAL MEDICINE

## 2022-10-30 PROCEDURE — 99222 1ST HOSP IP/OBS MODERATE 55: CPT | Performed by: INTERNAL MEDICINE

## 2022-10-30 RX ORDER — INSULIN LISPRO 100 [IU]/ML
0-7 INJECTION, SOLUTION INTRAVENOUS; SUBCUTANEOUS
Status: DISCONTINUED | OUTPATIENT
Start: 2022-10-30 | End: 2022-11-01 | Stop reason: HOSPADM

## 2022-10-30 RX ADMIN — Medication 10 ML: at 21:09

## 2022-10-30 RX ADMIN — LATANOPROST 1 DROP: 50 SOLUTION/ DROPS OPHTHALMIC at 21:10

## 2022-10-30 RX ADMIN — REMDESIVIR 100 MG: 100 INJECTION, POWDER, LYOPHILIZED, FOR SOLUTION INTRAVENOUS at 21:09

## 2022-10-30 RX ADMIN — FUROSEMIDE 40 MG: 20 TABLET ORAL at 08:01

## 2022-10-30 RX ADMIN — ACETAMINOPHEN 500 MG: 500 TABLET, FILM COATED ORAL at 00:35

## 2022-10-30 RX ADMIN — ACETAMINOPHEN 500 MG: 500 TABLET, FILM COATED ORAL at 08:02

## 2022-10-30 RX ADMIN — INSULIN LISPRO 2 UNITS: 100 INJECTION, SOLUTION INTRAVENOUS; SUBCUTANEOUS at 12:01

## 2022-10-30 RX ADMIN — DEXAMETHASONE 6 MG: 4 TABLET ORAL at 09:37

## 2022-10-30 RX ADMIN — METOPROLOL SUCCINATE 25 MG: 25 TABLET, EXTENDED RELEASE ORAL at 23:33

## 2022-10-30 RX ADMIN — PANTOPRAZOLE SODIUM 40 MG: 40 TABLET, DELAYED RELEASE ORAL at 08:02

## 2022-10-30 RX ADMIN — INSULIN LISPRO 5 UNITS: 100 INJECTION, SOLUTION INTRAVENOUS; SUBCUTANEOUS at 17:14

## 2022-10-30 RX ADMIN — ATORVASTATIN CALCIUM 80 MG: 80 TABLET, FILM COATED ORAL at 21:09

## 2022-10-30 RX ADMIN — EMPAGLIFLOZIN 10 MG: 10 TABLET, FILM COATED ORAL at 08:01

## 2022-10-30 RX ADMIN — CLOPIDOGREL 75 MG: 75 TABLET, FILM COATED ORAL at 08:01

## 2022-10-30 RX ADMIN — SERTRALINE HYDROCHLORIDE 50 MG: 50 TABLET, FILM COATED ORAL at 08:01

## 2022-10-30 RX ADMIN — APIXABAN 5 MG: 5 TABLET, FILM COATED ORAL at 23:33

## 2022-10-30 RX ADMIN — METOPROLOL SUCCINATE 25 MG: 25 TABLET, EXTENDED RELEASE ORAL at 08:01

## 2022-10-30 RX ADMIN — INSULIN LISPRO 4 UNITS: 100 INJECTION, SOLUTION INTRAVENOUS; SUBCUTANEOUS at 21:31

## 2022-10-30 RX ADMIN — Medication 10 ML: at 09:38

## 2022-10-30 RX ADMIN — RUXOLITINIB 15 MG: 15 TABLET ORAL at 09:38

## 2022-10-30 RX ADMIN — APIXABAN 5 MG: 5 TABLET, FILM COATED ORAL at 08:01

## 2022-10-31 LAB
ALBUMIN SERPL-MCNC: 3.9 G/DL (ref 3.5–5.2)
ALP SERPL-CCNC: 103 U/L (ref 39–117)
ALT SERPL W P-5'-P-CCNC: 21 U/L (ref 1–33)
ANION GAP SERPL CALCULATED.3IONS-SCNC: 12 MMOL/L (ref 5–15)
AST SERPL-CCNC: 18 U/L (ref 1–32)
BASOPHILS # BLD MANUAL: 0.46 10*3/MM3 (ref 0–0.2)
BASOPHILS NFR BLD MANUAL: 2 % (ref 0–1.5)
BILIRUB CONJ SERPL-MCNC: <0.2 MG/DL (ref 0–0.3)
BILIRUB INDIRECT SERPL-MCNC: NORMAL MG/DL
BILIRUB SERPL-MCNC: 0.3 MG/DL (ref 0–1.2)
BUN SERPL-MCNC: 18 MG/DL (ref 8–23)
BUN/CREAT SERPL: 22.5 (ref 7–25)
CALCIUM SPEC-SCNC: 8.4 MG/DL (ref 8.6–10.5)
CHLORIDE SERPL-SCNC: 104 MMOL/L (ref 98–107)
CO2 SERPL-SCNC: 23 MMOL/L (ref 22–29)
CREAT SERPL-MCNC: 0.8 MG/DL (ref 0.57–1)
D-LACTATE SERPL-SCNC: 1.6 MMOL/L (ref 0.5–2)
DEPRECATED RDW RBC AUTO: 55.7 FL (ref 37–54)
EGFRCR SERPLBLD CKD-EPI 2021: 72.8 ML/MIN/1.73
ERYTHROCYTE [DISTWIDTH] IN BLOOD BY AUTOMATED COUNT: 16.1 % (ref 12.3–15.4)
GLUCOSE BLDC GLUCOMTR-MCNC: 150 MG/DL (ref 70–130)
GLUCOSE BLDC GLUCOMTR-MCNC: 174 MG/DL (ref 70–130)
GLUCOSE BLDC GLUCOMTR-MCNC: 227 MG/DL (ref 70–130)
GLUCOSE BLDC GLUCOMTR-MCNC: 229 MG/DL (ref 70–130)
GLUCOSE BLDC GLUCOMTR-MCNC: 309 MG/DL (ref 70–130)
GLUCOSE SERPL-MCNC: 128 MG/DL (ref 65–99)
HCT VFR BLD AUTO: 27.5 % (ref 34–46.6)
HGB BLD-MCNC: 9.6 G/DL (ref 12–15.9)
HYPOCHROMIA BLD QL: ABNORMAL
LYMPHOCYTES # BLD MANUAL: 0.69 10*3/MM3 (ref 0.7–3.1)
LYMPHOCYTES NFR BLD MANUAL: 7 % (ref 5–12)
MAGNESIUM SERPL-MCNC: 1.8 MG/DL (ref 1.6–2.4)
MCH RBC QN AUTO: 33.3 PG (ref 26.6–33)
MCHC RBC AUTO-ENTMCNC: 34.9 G/DL (ref 31.5–35.7)
MCV RBC AUTO: 95.5 FL (ref 79–97)
METAMYELOCYTES NFR BLD MANUAL: 1 % (ref 0–0)
MONOCYTES # BLD: 1.61 10*3/MM3 (ref 0.1–0.9)
MYELOCYTES NFR BLD MANUAL: 4 % (ref 0–0)
NEUTROPHILS # BLD AUTO: 19.14 10*3/MM3 (ref 1.7–7)
NEUTROPHILS NFR BLD MANUAL: 83 % (ref 42.7–76)
NRBC BLD AUTO-RTO: 0 /100 WBC (ref 0–0.2)
PLAT MORPH BLD: NORMAL
PLATELET # BLD AUTO: 192 10*3/MM3 (ref 140–450)
PMV BLD AUTO: 12.2 FL (ref 6–12)
POTASSIUM SERPL-SCNC: 3.2 MMOL/L (ref 3.5–5.2)
PROT SERPL-MCNC: 6.7 G/DL (ref 6–8.5)
RBC # BLD AUTO: 2.88 10*6/MM3 (ref 3.77–5.28)
SODIUM SERPL-SCNC: 139 MMOL/L (ref 136–145)
VARIANT LYMPHS NFR BLD MANUAL: 3 % (ref 19.6–45.3)
WBC MORPH BLD: NORMAL
WBC NRBC COR # BLD: 23.06 10*3/MM3 (ref 3.4–10.8)

## 2022-10-31 PROCEDURE — 97535 SELF CARE MNGMENT TRAINING: CPT

## 2022-10-31 PROCEDURE — 80076 HEPATIC FUNCTION PANEL: CPT | Performed by: INTERNAL MEDICINE

## 2022-10-31 PROCEDURE — 63710000001 DEXAMETHASONE PER 0.25 MG: Performed by: HOSPITALIST

## 2022-10-31 PROCEDURE — 85025 COMPLETE CBC W/AUTO DIFF WBC: CPT | Performed by: INTERNAL MEDICINE

## 2022-10-31 PROCEDURE — 36415 COLL VENOUS BLD VENIPUNCTURE: CPT | Performed by: INTERNAL MEDICINE

## 2022-10-31 PROCEDURE — 63710000001 INSULIN LISPRO (HUMAN) PER 5 UNITS: Performed by: HOSPITALIST

## 2022-10-31 PROCEDURE — 99233 SBSQ HOSP IP/OBS HIGH 50: CPT | Performed by: INTERNAL MEDICINE

## 2022-10-31 PROCEDURE — 83735 ASSAY OF MAGNESIUM: CPT | Performed by: HOSPITALIST

## 2022-10-31 PROCEDURE — 97162 PT EVAL MOD COMPLEX 30 MIN: CPT

## 2022-10-31 PROCEDURE — 80048 BASIC METABOLIC PNL TOTAL CA: CPT | Performed by: INTERNAL MEDICINE

## 2022-10-31 PROCEDURE — 83605 ASSAY OF LACTIC ACID: CPT | Performed by: HOSPITALIST

## 2022-10-31 PROCEDURE — 82962 GLUCOSE BLOOD TEST: CPT

## 2022-10-31 PROCEDURE — 25010000002 REMDESIVIR 100 MG/20ML SOLUTION 1 EACH VIAL: Performed by: INTERNAL MEDICINE

## 2022-10-31 PROCEDURE — 85007 BL SMEAR W/DIFF WBC COUNT: CPT | Performed by: INTERNAL MEDICINE

## 2022-10-31 PROCEDURE — 97530 THERAPEUTIC ACTIVITIES: CPT

## 2022-10-31 PROCEDURE — 97166 OT EVAL MOD COMPLEX 45 MIN: CPT

## 2022-10-31 RX ORDER — POTASSIUM CHLORIDE 7.45 MG/ML
10 INJECTION INTRAVENOUS
Status: DISCONTINUED | OUTPATIENT
Start: 2022-10-31 | End: 2022-11-01 | Stop reason: HOSPADM

## 2022-10-31 RX ORDER — POTASSIUM CHLORIDE 1.5 G/1.77G
40 POWDER, FOR SOLUTION ORAL AS NEEDED
Status: DISCONTINUED | OUTPATIENT
Start: 2022-10-31 | End: 2022-11-01 | Stop reason: HOSPADM

## 2022-10-31 RX ORDER — POTASSIUM CHLORIDE 750 MG/1
40 TABLET, FILM COATED, EXTENDED RELEASE ORAL AS NEEDED
Status: DISCONTINUED | OUTPATIENT
Start: 2022-10-31 | End: 2022-11-01 | Stop reason: HOSPADM

## 2022-10-31 RX ADMIN — Medication 10 ML: at 10:29

## 2022-10-31 RX ADMIN — HYDROCORTISONE 1 APPLICATION: 1 OINTMENT TOPICAL at 22:03

## 2022-10-31 RX ADMIN — METOPROLOL SUCCINATE 25 MG: 25 TABLET, EXTENDED RELEASE ORAL at 10:28

## 2022-10-31 RX ADMIN — ATORVASTATIN CALCIUM 80 MG: 80 TABLET, FILM COATED ORAL at 22:03

## 2022-10-31 RX ADMIN — METOPROLOL SUCCINATE 25 MG: 25 TABLET, EXTENDED RELEASE ORAL at 22:03

## 2022-10-31 RX ADMIN — APIXABAN 5 MG: 5 TABLET, FILM COATED ORAL at 22:02

## 2022-10-31 RX ADMIN — LATANOPROST 1 DROP: 50 SOLUTION/ DROPS OPHTHALMIC at 22:03

## 2022-10-31 RX ADMIN — REMDESIVIR 100 MG: 100 INJECTION, POWDER, LYOPHILIZED, FOR SOLUTION INTRAVENOUS at 22:03

## 2022-10-31 RX ADMIN — EMPAGLIFLOZIN 10 MG: 10 TABLET, FILM COATED ORAL at 10:28

## 2022-10-31 RX ADMIN — PANTOPRAZOLE SODIUM 40 MG: 40 TABLET, DELAYED RELEASE ORAL at 10:28

## 2022-10-31 RX ADMIN — DEXAMETHASONE 6 MG: 4 TABLET ORAL at 10:28

## 2022-10-31 RX ADMIN — CLOPIDOGREL 75 MG: 75 TABLET, FILM COATED ORAL at 10:28

## 2022-10-31 RX ADMIN — POTASSIUM CHLORIDE 40 MEQ: 750 TABLET, EXTENDED RELEASE ORAL at 22:02

## 2022-10-31 RX ADMIN — INSULIN LISPRO 3 UNITS: 100 INJECTION, SOLUTION INTRAVENOUS; SUBCUTANEOUS at 22:02

## 2022-10-31 RX ADMIN — FUROSEMIDE 40 MG: 20 TABLET ORAL at 10:28

## 2022-10-31 RX ADMIN — SERTRALINE HYDROCHLORIDE 50 MG: 50 TABLET, FILM COATED ORAL at 10:28

## 2022-10-31 RX ADMIN — Medication 10 ML: at 22:03

## 2022-10-31 RX ADMIN — APIXABAN 5 MG: 5 TABLET, FILM COATED ORAL at 10:28

## 2022-10-31 RX ADMIN — INSULIN LISPRO 3 UNITS: 100 INJECTION, SOLUTION INTRAVENOUS; SUBCUTANEOUS at 17:48

## 2022-11-01 ENCOUNTER — READMISSION MANAGEMENT (OUTPATIENT)
Dept: CALL CENTER | Facility: HOSPITAL | Age: 82
End: 2022-11-01

## 2022-11-01 VITALS
HEIGHT: 61 IN | TEMPERATURE: 98 F | DIASTOLIC BLOOD PRESSURE: 60 MMHG | BODY MASS INDEX: 28.7 KG/M2 | HEART RATE: 81 BPM | RESPIRATION RATE: 16 BRPM | SYSTOLIC BLOOD PRESSURE: 110 MMHG | WEIGHT: 152 LBS | OXYGEN SATURATION: 95 %

## 2022-11-01 DIAGNOSIS — D47.1 MYELOPROLIFERATIVE DISORDER: Primary | ICD-10-CM

## 2022-11-01 LAB
ALBUMIN SERPL-MCNC: 3.9 G/DL (ref 3.5–5.2)
ALP SERPL-CCNC: 141 U/L (ref 39–117)
ALT SERPL W P-5'-P-CCNC: 19 U/L (ref 1–33)
ANION GAP SERPL CALCULATED.3IONS-SCNC: 15 MMOL/L (ref 5–15)
ANISOCYTOSIS BLD QL: ABNORMAL
AST SERPL-CCNC: 21 U/L (ref 1–32)
BILIRUB CONJ SERPL-MCNC: <0.2 MG/DL (ref 0–0.3)
BILIRUB INDIRECT SERPL-MCNC: ABNORMAL MG/DL
BILIRUB SERPL-MCNC: 0.3 MG/DL (ref 0–1.2)
BUN SERPL-MCNC: 20 MG/DL (ref 8–23)
BUN/CREAT SERPL: 25 (ref 7–25)
CALCIUM SPEC-SCNC: 8.4 MG/DL (ref 8.6–10.5)
CHLORIDE SERPL-SCNC: 102 MMOL/L (ref 98–107)
CO2 SERPL-SCNC: 20 MMOL/L (ref 22–29)
CREAT SERPL-MCNC: 0.8 MG/DL (ref 0.57–1)
DEPRECATED RDW RBC AUTO: 58.4 FL (ref 37–54)
EGFRCR SERPLBLD CKD-EPI 2021: 72.8 ML/MIN/1.73
ERYTHROCYTE [DISTWIDTH] IN BLOOD BY AUTOMATED COUNT: 16.6 % (ref 12.3–15.4)
GLUCOSE BLDC GLUCOMTR-MCNC: 123 MG/DL (ref 70–130)
GLUCOSE BLDC GLUCOMTR-MCNC: 145 MG/DL (ref 70–130)
GLUCOSE BLDC GLUCOMTR-MCNC: 237 MG/DL (ref 70–130)
GLUCOSE SERPL-MCNC: 64 MG/DL (ref 65–99)
HCT VFR BLD AUTO: 29.4 % (ref 34–46.6)
HGB BLD-MCNC: 10 G/DL (ref 12–15.9)
LYMPHOCYTES # BLD MANUAL: 0.76 10*3/MM3 (ref 0.7–3.1)
LYMPHOCYTES NFR BLD MANUAL: 4.9 % (ref 5–12)
MACROCYTES BLD QL SMEAR: ABNORMAL
MCH RBC QN AUTO: 33.2 PG (ref 26.6–33)
MCHC RBC AUTO-ENTMCNC: 34 G/DL (ref 31.5–35.7)
MCV RBC AUTO: 97.7 FL (ref 79–97)
MONOCYTES # BLD: 2.32 10*3/MM3 (ref 0.1–0.9)
MYELOCYTES NFR BLD MANUAL: 0.8 % (ref 0–0)
NEUTROPHILS # BLD AUTO: 43.97 10*3/MM3 (ref 1.7–7)
NEUTROPHILS NFR BLD MANUAL: 92.7 % (ref 42.7–76)
PLAT MORPH BLD: NORMAL
PLATELET # BLD AUTO: 226 10*3/MM3 (ref 140–450)
PMV BLD AUTO: 11.8 FL (ref 6–12)
POTASSIUM SERPL-SCNC: 4.1 MMOL/L (ref 3.5–5.2)
PROT SERPL-MCNC: 6.4 G/DL (ref 6–8.5)
RBC # BLD AUTO: 3.01 10*6/MM3 (ref 3.77–5.28)
SODIUM SERPL-SCNC: 137 MMOL/L (ref 136–145)
VARIANT LYMPHS NFR BLD MANUAL: 1.6 % (ref 19.6–45.3)
WBC MORPH BLD: NORMAL
WBC NRBC COR # BLD: 47.43 10*3/MM3 (ref 3.4–10.8)

## 2022-11-01 PROCEDURE — 94618 PULMONARY STRESS TESTING: CPT

## 2022-11-01 PROCEDURE — 99232 SBSQ HOSP IP/OBS MODERATE 35: CPT | Performed by: INTERNAL MEDICINE

## 2022-11-01 PROCEDURE — 80048 BASIC METABOLIC PNL TOTAL CA: CPT | Performed by: HOSPITALIST

## 2022-11-01 PROCEDURE — 82962 GLUCOSE BLOOD TEST: CPT

## 2022-11-01 PROCEDURE — 63710000001 DEXAMETHASONE PER 0.25 MG: Performed by: HOSPITALIST

## 2022-11-01 PROCEDURE — 85025 COMPLETE CBC W/AUTO DIFF WBC: CPT | Performed by: INTERNAL MEDICINE

## 2022-11-01 PROCEDURE — 63710000001 RUXOLITINIB 15 MG TABLET: Performed by: INTERNAL MEDICINE

## 2022-11-01 PROCEDURE — C9399 UNCLASSIFIED DRUGS OR BIOLOG: HCPCS | Performed by: INTERNAL MEDICINE

## 2022-11-01 PROCEDURE — 80076 HEPATIC FUNCTION PANEL: CPT | Performed by: INTERNAL MEDICINE

## 2022-11-01 PROCEDURE — 85007 BL SMEAR W/DIFF WBC COUNT: CPT | Performed by: INTERNAL MEDICINE

## 2022-11-01 PROCEDURE — 36415 COLL VENOUS BLD VENIPUNCTURE: CPT | Performed by: HOSPITALIST

## 2022-11-01 RX ADMIN — EMPAGLIFLOZIN 10 MG: 10 TABLET, FILM COATED ORAL at 10:03

## 2022-11-01 RX ADMIN — APIXABAN 5 MG: 5 TABLET, FILM COATED ORAL at 10:03

## 2022-11-01 RX ADMIN — FUROSEMIDE 40 MG: 20 TABLET ORAL at 10:03

## 2022-11-01 RX ADMIN — RUXOLITINIB 15 MG: 15 TABLET ORAL at 10:03

## 2022-11-01 RX ADMIN — CLOPIDOGREL 75 MG: 75 TABLET, FILM COATED ORAL at 10:03

## 2022-11-01 RX ADMIN — DEXAMETHASONE 6 MG: 4 TABLET ORAL at 10:03

## 2022-11-01 RX ADMIN — METOPROLOL SUCCINATE 25 MG: 25 TABLET, EXTENDED RELEASE ORAL at 10:03

## 2022-11-01 RX ADMIN — PANTOPRAZOLE SODIUM 40 MG: 40 TABLET, DELAYED RELEASE ORAL at 10:03

## 2022-11-01 RX ADMIN — SERTRALINE HYDROCHLORIDE 50 MG: 50 TABLET, FILM COATED ORAL at 10:03

## 2022-11-01 NOTE — DISCHARGE SUMMARY
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          Consults     Date and Time Order Name Status Description    10/30/2022 11:16 AM Hematology & Oncology Inpatient Consult Completed     10/29/2022  6:32 PM [facility] (on-call MD unless specified) Details          Hospital Course  82 y.o. female with a history of myeloproliferative disorder, porcelain gallbladder, atrial fibrillation, chronic diastolic CHF, breast cancer, diabetes mellitus type 2 presented with cough and congestion and was diagnosed with COVID. She required supplemental oxygen and was started on dexamethasone and remdesivir. Hematology/oncology followed and held Jakafi and they plan to restart it today. She was noted to have a indeterminate 2 cm lesion in the spleen and hematology/oncology is considering MRI as an outpatient. She did not want any further work-up for her porcelain gallbladder. She has been mostly asymptomatic but did have episodes of right upper quadrant/epigastric pain that improved. She is from an assisted living facility and plans to return there with care tenders today. She is currently on 2 L/min nasal cannula.    Also discussed with her CODE STATUS and goals of care. She wishes to be DNR.    I discussed the patient's findings and my recommendations with patient and nursing staff.    Condition on Discharge: Stable for discharge she would very much like to go home today.     Temp:  [98 °F (36.7 °C)-99.1  °F (37.3 °C)] 98 °F (36.7 °C)  Heart Rate:  [77-92] 81  Resp:  [16-18] 16  BP: ()/(58-91) 110/60  Body mass index is 28.72 kg/m².    Physical Exam  Constitutional:       General: She is not in acute distress.     Appearance: Normal appearance. She is not toxic-appearing.   HENT:      Head: Normocephalic and atraumatic.   Cardiovascular:      Rate and Rhythm: Normal rate and regular rhythm.   Pulmonary:      Effort: Pulmonary effort is normal. No respiratory distress.      Breath sounds: Normal breath sounds. No wheezing, rhonchi or rales.   Abdominal:      General: Bowel sounds are normal.      Palpations: Abdomen is soft.      Tenderness: There is abdominal tenderness (mild) in the right upper quadrant and epigastric area. There is no guarding or rebound.   Musculoskeletal:         General: No swelling.   Skin:     General: Skin is warm and dry.   Neurological:      General: No focal deficit present.      Mental Status: She is alert and oriented to person, place, and time.   Psychiatric:         Mood and Affect: Mood normal.         Behavior: Behavior normal.         Thought Content: Thought content normal.       Disposition: Home or Self Care       Discharge Medications      Continue These Medications      Instructions Start Date   acetaminophen 500 MG tablet  Commonly known as: TYLENOL   500 mg, Oral, As Needed      apixaban 5 MG tablet tablet  Commonly known as: ELIQUIS   5 mg, Oral, Every 12 Hours Scheduled      atorvastatin 80 MG tablet  Commonly known as: LIPITOR   1 tablet, Oral, Nightly      bimatoprost 0.01 % ophthalmic drops  Commonly known as: LUMIGAN   1 drop, Ophthalmic      clopidogrel 75 MG tablet  Commonly known as: PLAVIX   1 tablet, Oral, Daily      cyclobenzaprine 5 MG tablet  Commonly known as: FLEXERIL   5 mg, Oral, Every 8 Hours PRN      Diclofenac Sodium 1 % gel gel  Commonly known as: VOLTAREN   No dose, route, or frequency recorded.      Ear Drops 6.5 % otic solution  Generic drug:  carbamide peroxide   INSTILL 10 DROPS IN EACH EAR DAILY FOR 14 DAYS. START LEFT EAR AFTER COMPLETION OF OFLOXACIN      empagliflozin 10 MG tablet tablet  Commonly known as: JARDIANCE   10 mg, Oral, Daily      furosemide 40 MG tablet  Commonly known as: LASIX   40 mg, Oral, Daily      Jakafi 15 MG chemo tablet  Generic drug: ruxolitinib   15 mg, Oral, 2 Times Daily      latanoprost 0.005 % ophthalmic solution  Commonly known as: XALATAN   No dose, route, or frequency recorded.      metoprolol succinate XL 25 MG 24 hr tablet  Commonly known as: TOPROL-XL   25 mg, 2 Times Daily      ofloxacin 0.3 % otic solution  Commonly known as: FLOXIN   No dose, route, or frequency recorded.      pantoprazole 40 MG EC tablet  Commonly known as: PROTONIX   40 mg, Oral, Daily      sertraline 50 MG tablet  Commonly known as: ZOLOFT   50 mg, Oral, Daily            Diet Instructions     Diet: Regular, Consistent Carbohydrate      Discharge Diet:  Regular  Consistent Carbohydrate            Activity Instructions     Activity as Tolerated           Additional Instructions for the Follow-ups that You Need to Schedule     Ambulatory Referral to Home Health (Hospital)   As directed      Face to Face Visit Date: 11/1/2022    Follow-up provider for Plan of Care?: I treated the patient in an acute care facility and will not continue treatment after discharge.    Follow-up provider: CONCETTA SUGGS [[id number]]    Reason/Clinical Findings: covid    Describe mobility limitations that make leaving home difficult: covid    Nursing/Therapeutic Services Requested: Physical Therapy Skilled Nursing    Skilled nursing orders: O2 instruction    PT orders: Strengthening    Frequency: 1 Week 1         Call MD for problems / concerns.   As directed         Contact information for follow-up providers     Concetta Suggs, ABEL .    Specialty: Nurse Practitioner  Contact information:  [address] [place] [address] [address]  [address]  [facility] [address] [phone]                    Contact information for after-discharge care     Destination     [facility] .    Service: Assisted Living  Contact information:  [address]  [facility] [address] [phone]                 Durable Medical Equipment     [facility] .    Service: Durable Medical Equipment  Contact information:  [address]  [facility] [address]  [phone]                 Home Medical Care     [facility] .    Service: Home Health Services  Contact information:  [address]  [facility] [address] [phone]                            Pending Labs     Order Current Status    Basic Metabolic Panel Collected (11/01/22 0831)    CBC & Differential Collected (11/01/22 0831)    CBC Auto Differential Collected (11/01/22 0831)    Hepatic Function Panel Collected (11/01/22 0831)         Roldan Pina MD  [facility]  11/01/22    Discharge time spent greater than 30 minutes.

## 2022-11-01 NOTE — THERAPY EVALUATION
Exercise Oximetry    Patient Name:Catherine Boyer   MRN: [id number]   Date: 11/01/22             ROOM AIR BASELINE   SpO2% 86   Heart Rate 87   Blood Pressure      EXERCISE ON ROOM AIR SpO2% EXERCISE ON O2 @ 2 LPM SpO2%   1 MINUTE  1 MINUTE 100   2 MINUTES  2 MINUTES 98   3 MINUTES  3 MINUTES 98   4 MINUTES  4 MINUTES 98   5 MINUTES  5 MINUTES 99   6 MINUTES  6 MINUTES               Distance Walked   Distance Walked   Dyspnea (Germán Scale)   Dyspnea (Germán Scale)   Fatigue (Germán Scale)   Fatigue (Germán Scale)   SpO2% Post Exercise   SpO2% Post Exercise 97   HR Post Exercise   HR Post Exercise 89   Time to Recovery   Time to Recovery     Comments: Pts o2 dropped to 86% while on room air before the walk started. Placed pt on 2L o2. Pt in [place]. Walked pt in her room with a walker. Pt said she felt tired and wanted to stop about 5 min into the walk. Pt said she did not feel soa or dizzy during the walk. Just tired.

## 2022-11-01 NOTE — PROGRESS NOTES
[facility] CBC GROUP INPATIENT PROGRESS NOTE    Length of Stay:  2 days    CHIEF COMPLAINT:  Myelofibrosis, anemia, atrial fibrillation, COVID-19 infection    SUBJECTIVE:   Patient reports that she is improving today.  She is on 2 L O2 nasal cannula, did desaturate off oxygen while ambulating and plans are for her to go home on 2 L O2.  She reports that her appetite is improving, fatigue is improving.  No new complaints today.    ROS:  Review of Systems   A comprehensive review of systems was obtained with pertinent positive findings as noted in the interval history above.  All other systems negative.    OBJECTIVE:  Vitals:    10/31/22 1300 10/31/22 1656 10/31/22 2012 10/31/22 2339   BP:  (!) 82/58 137/70 148/91   BP Location:  Right arm Right arm Right arm   Patient Position:   Lying Lying   Pulse: 87  83 77   Resp: 18  18 18   Temp: 98.2 °F (36.8 °C)  99.1 °F (37.3 °C) 98.7 °F (37.1 °C)   TempSrc: Oral  Oral Oral   SpO2: 95%   95%   Weight:       Height:             PHYSICAL EXAMINATION:  General: Alert and orient x3 no distress  Chest/Lungs: Clear to auscultation bilaterally anteriorly  Heart: Regular rate and rhythm no murmurs gallops or rubs  Abdomen/GI: Soft nontender nondistended bowel sounds present  Extremities: No edema    Patient was examined today, unchanged from above    DIAGNOSTIC DATA:  Results Review:     I reviewed the patient's new clinical results.    Results from last 7 days   Lab Units 10/31/22  0706 10/30/22  0730 10/29/22  1558   WBC 10*3/mm3 23.06* 18.80* 18.52*   HEMOGLOBIN g/dL 9.6* 9.6* 10.6*   HEMATOCRIT % 27.5* 28.7* 31.8*   PLATELETS 10*3/mm3 192 153 181      Results from last 7 days   Lab Units 10/31/22  0706 10/30/22  0730 10/29/22  2023 10/29/22  1558   SODIUM mmol/L 139 136  --  134*   POTASSIUM mmol/L 3.2* 3.7  --  4.0   CHLORIDE mmol/L 104 101  --  96*   CO2 mmol/L 23.0 22.3  --  23.3   BUN mg/dL 18 16  --  21   CREATININE mg/dL 0.80 0.82 1.00 0.97   CALCIUM mg/dL 8.4* 8.2*   --  9.2   BILIRUBIN mg/dL 0.3 0.3 0.4 0.4   ALK PHOS U/L 103 104 111 120*   ALT (SGPT) U/L 21 24 26 27   AST (SGOT) U/L 18 26 27 28   GLUCOSE mg/dL 128* 130*  --  137*      Lab Results   Component Value Date    NEUTROABS 19.14 (H) 10/31/2022         Results from last 7 days   Lab Units 10/31/22  1731   MAGNESIUM mg/dL 1.8           Assessment & Plan   ASSESSMENT/PLAN:  This is a 82 y.o. female with:     *COVID-19 infection  · Patient presented on 10/29/2022 with progressive generalized weakness x10 days  · Patient tested positive for COVID-19 on admission 10/29 622  · Elevated lactate 3.0  · Chest x-ray 10/29/2022 with no evidence of consolidation  · Patient was started on dexamethasone and remdesivir  · Patient requiring 2 L O2 nasal cannula (requires 2 L O2 nasal cannula at night chronically).  Plans for patient to go home on oxygen 2 L nasal cannula continuously for now.    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 Reinoso  • She has been seen by Dr. Gerard Foreman with [facility] specialists and cancer and blood disorders in [place].      • She was seen there initially on 6/4/2021 with leukocytosis with a white blood cell count of 104,000.  Hemoglobin was normal at 11.6 and platelets were normal at 299,000.  PCR for BCR/ABL and FISH for BCR/ABL were negative.  A bone marrow aspiration was attempted on 6/9/2021 which was unsuccessful.  She then went to [place] for the summer.  She was admitted to the [facility] between 6/29/2021 and 7/9/2021.  She had a bone marrow aspiration and biopsy performed there on 6/30/2021 showing a hypercellular marrow at greater than 95% with 1% blasts.  Focal 1+ reticulin fibrosis was noted.  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  PCR for BCR/ABL was negative.  JAK2 V617F PCR was positive.  Cytogenetics were normal.  Next generation myeloid disorder profiling of the bone marrow aspirate from  6/30/2021 showed TET2 M1288S and [id number]*47 abnormalities and JAK2 V617F.  Therefore she was suspected to have early primary myelofibrosis and she was started on hydroxyurea 1000 mg daily.  Subsequently, hydroxyurea was held.  Blood counts had improved.  She was admitted to the hospital from 2/9 through 2/15/2022 for symptomatic anemia and chest pain.  Her hemoglobin was 5.5.  The white blood cell count was 7.6.  Platelets were 56,000.  Fecal occult blood testing was negative.  She received 3 units of packed red blood cells.  No endoscopy was performed.  A left heart catheterization was performed with a drug-eluting stent placed to the second diagonal on 2/11/2022 and she was discharged from that hospitalization on aspirin 81 mg, Plavix 75 mg, and Eliquis 5 mg twice daily.  Blood counts improved by 3/7/2022 and her white blood cell count was 7.2 with a hemoglobin 11.4 and platelets 295,000.  She did have a bone marrow aspiration and biopsy on 3/7/2022 showing chronic myeloproliferative neoplasm with potentially post ET fibrosis or consideration of primary myelofibrosis.  There was no evidence for myelodysplasia.  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  She also has atrial fibrillation and is anticoagulated with Eliquis.    • Hydroxyurea was discontinued and she was started on ruxolitinib 15 mg twice daily at her visit on 3/14/2022 with Dr. Gerard Foreman.  • She had CT imaging of the chest abdomen pelvis on 3/19/2022 that did not demonstrate any splenomegaly.  Calcified mediastinal lymphadenopathy was noted.  Borderline pretracheal and right hilar lymphadenopathy noted.  Small bilateral pleural effusions with bibasilar atelectasis noted.  • She has moved from [place] to [place] and is seen initially in the office on 5/15/2022.  She tolerates ruxolitinib well.  White blood cell count has decreased from 5.9 from 19.6.   Platelets have normalized at 170,000, down from 464,000.  The hemoglobin has also decreased at 8.6 with an MCV of 98.9.  • 5/26/2022: White blood cell count mildly elevated at 12.4 with a normal platelet count at 216,000.  She tolerates Jakafi very well.   • 6/30/2022: White blood cell count a little higher.  Continue monitoring.  • 9/13/2022: White blood cell count higher at 32,000  • Flow cytometry 9/13/2022 with 0.1% myeloblasts with a typical (normal) phenotype.  Nonspecific monocyte population.  • Bone marrow aspiration and biopsy on 10/7/2022 with hypercellular marrow at 95% with involvement by chronic myeloproliferative neoplasm, less than 5% blasts.  Mild reticulin fibrosis.  Decreased iron stores.  Consideration of CMML.  Flow cytometry showed a dim CD56 positive aberrant monocyte population at 8.2 percent of events.  Cytogenetics pending.  NGS pending.  • 10/26/2022: White blood cell count improved at 21,000  • Patient admitted 10/29/2022 with COVID-19 infection.  Jakafi held.  • Resumed Kloqaj67 mg twice daily on 11/1/2022.  • WBC today has increased from 23 up to 47, likely reflecting her being off of Jakafi briefly and also potentially related to response to current COVID-19 infection.  She is now back on her home medication.  She is being discharged home today.  We will have her return in 7 to 10 days for NP visit in the office and she will be seeing Dr. Reinoso in follow-up on 11/28/2022.    *Chronic microcytic anemia  • Hemoglobin on 5/5/2022 was declining at 8.6.  • Ferritin 242 with an iron of 105 and 33% iron saturation, ferritin vitamin B12 1107, folic acid 15.1, normal RBC folate, creatinine 0.86,   • 5/26/2022: Hemoglobin stable at 8.5.  The reticulocyte count is a little bit more elevated.  I am hopeful that the hemoglobin will improve.  If it does not we will need to consider Procrit.  • 6/30/2022: Hemoglobin improving at 9.5 with reticulocyte count of 3%.   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  She does not wish to pursue further evaluation.  She did have some brief epigastric/right upper quadrant pain which has now resolved.    *Splenic lesion on CT  · CT abdomen and pelvis 10/29/2022 with 2 cm low-attenuation lesion in the spleen, indeterminant  · Consider outpatient MRI for further evaluation    Plan:  1. Patient is being discharged home today  2. Continue Jakafi 15 mg twice daily (resumed on 11/1/2022)  3. Patient continues on chronic Eliquis 5 mg twice daily  4. Patient continues on chronic Plavix 75 mg daily  5. We will make arrangements for outpatient follow-up with NP visit in 7 to 10 days with CBC, CMP and she will see Dr. Reinoso as scheduled on 11/28/2022.    Discussed with patient at bedside.           Charles Salazar MD

## 2022-11-01 NOTE — PLAN OF CARE
Problem: Adult Inpatient Plan of Care  Goal: Plan of Care Review  11/1/2022 1502 by Kyra Figueroa, RN  Flowsheets (Taken 11/1/2022 1502)  Outcome Evaluation: VSS, 2L NC. Leaving this late afternoon, No c/o pain or SOA  11/1/2022 1405 by Kyra Figueroa, RN  Outcome: Ongoing, Progressing   Goal Outcome Evaluation:              Outcome Evaluation: VSS, 2L NC. Leaving this late afternoon, No c/o pain or SOA

## 2022-11-01 NOTE — DISCHARGE PLACEMENT REQUEST
"Maribell Boyer (82 y.o. Female)     Date of Birth   1940    Social Security Number       Address   [address] [place] [address] [address] [place] [address]    Home Phone   [phone]    MRN   [id number]       Yarsanism   Unknown    Marital Status                               Admission Date   10/29/22    Admission Type   Emergency    Admitting Provider   Teressa Cole MD    Attending Provider   Roldan Pina MD    Department, Room/Bed   [address], [address]1       Discharge Date       Discharge Disposition   Home or Self Care    Discharge Destination                               Attending Provider: Roldan Pina MD    Allergies: Aspirin, Oxycodone, Diphenhydramine Hcl    Isolation: Enh Drop/Con   Infection: COVID (confirmed) (10/29/22)   Code Status: No CPR    Ht: 154.9 cm (61\")   Wt: 68.9 kg (152 lb)    Admission Cmt: None   Principal Problem: COVID [U07.1]                 Active Insurance as of 10/29/2022     Primary Coverage     Payor Plan Insurance Group Employer/Plan Group    AETNA MEDICARE REPLACEMENT AETNA MEDICARE REPLACEMENT [id number]     Payor Plan Address Payor Plan Phone Number Payor Plan Fax Number Effective Dates    [address] [phone]  1/1/2022 - None Entered    [facility] [id number]       Subscriber Name Subscriber Birth Date Member ID       MARIBELL BOYER 1940 [id number]                 Emergency Contacts      (Rel.) Home Phone Work Phone Mobile Phone    RAY BOYER (Daughter) [phone] -- [phone]    Karen Gutierrez (Daughter) -- -- [phone]    MerlinGreg (Son) -- -- [phone]              "

## 2022-11-01 NOTE — CASE MANAGEMENT/SOCIAL WORK
Continued Stay Note  [facility]     Patient Name: Catherine Boyer  MRN: [id number]  Today's Date: 11/1/2022    Admit Date: 10/29/2022    Plan: Home with [facility] and continuous 02 through [facility]   Discharge Plan     Row Name 11/01/22 1322       Plan    Plan Home with [facility] and continuous 02 through [facility]    Patient/Family in Agreement with Plan yes    Plan Comments RT assessment noted, pt qualifies for continuous oxygen, she is current with [facility] for nocturnal, spoke with Mario/Shikha she will deliver transport tank. Pt did well with therapy and is ok to return to [facility]. Family will transport, RN to communicate/arrange timing. Spoke with Christina/Hamzah, to notify of d/c, she is current with them, orders in.  Based on interdisciplinary assessments, the recommended discharge plan is CYNTHIA with . -Sarah DAUGHERTY               Discharge Codes    No documentation.               Expected Discharge Date and Time     Expected Discharge Date Expected Discharge Time    Nov 1, 2022             Sarah Solis RN

## 2022-11-02 ENCOUNTER — READMISSION MANAGEMENT (OUTPATIENT)
Dept: CALL CENTER | Facility: HOSPITAL | Age: 82
End: 2022-11-02

## 2022-11-02 NOTE — PAYOR COMM NOTE
"Maribell Boyer (82 y.o. Female)     PLEASE SEE  ATTACHED DC SUMMARY    REF#[id number]    THANK YOU    BARBI CASTRO. LPN CCP    Date of Birth   1940    Social Security Number       Address   [address] [place] [address] [address] [place] [address]    Home Phone   [phone]    MRN   [id number]       Mandaeism   Unknown    Marital Status                               Admission Date   10/29/22    Admission Type   Emergency    Admitting Provider   Teressa Cole MD    Attending Provider       Department, Room/Bed   [address], [address]1       Discharge Date   11/1/2022    Discharge Disposition   Home or Self Care    Discharge Destination                               Attending Provider: (none)   Allergies: Aspirin, Oxycodone, Diphenhydramine Hcl    Isolation: None   Infection: COVID (confirmed) (10/29/22)   Code Status: Prior    Ht: 154.9 cm (61\")   Wt: 68.9 kg (152 lb)    Admission Cmt: None   Principal Problem: COVID [U07.1]                 Active Insurance as of 10/29/2022     Primary Coverage     Payor Plan Insurance Group Employer/Plan Group    AETNA MEDICARE REPLACEMENT AETNA MEDICARE REPLACEMENT [id number]     Payor Plan Address Payor Plan Phone Number Payor Plan Fax Number Effective Dates    [address] [phone]  1/1/2022 - None Entered    [facility] [id number]       Subscriber Name Subscriber Birth Date Member ID       MARIBELL BOYER 1940 [id number]                 Emergency Contacts      (Rel.) Home Phone Work Phone Mobile Phone    MONIKRAY (Daughter) [phone] -- [phone]    BrendaKaren (Daughter) -- -- [phone]    Greg Boyer (Son) -- -- [phone]               Discharge Summary      Roldan Pina MD at 11/01/22 0749                              [place] [facility]               [facility]    Date of Discharge:  11/1/2022    PCP: Kristi Moreau APRN    Discharge Diagnosis:   Active Hospital Problems    " Diagnosis  POA   • **COVID [U07.1]  Yes   • 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          Consults     Date and Time Order Name Status Description    10/30/2022 11:16 AM Hematology & Oncology Inpatient Consult Completed     10/29/2022  6:32 PM [facility] (on-call MD unless specified) Details          Hospital Course  82 y.o. female with a history of myeloproliferative disorder, porcelain gallbladder, atrial fibrillation, chronic diastolic CHF, breast cancer, diabetes mellitus type 2 presented with cough and congestion and was diagnosed with COVID. She required supplemental oxygen and was started on dexamethasone and remdesivir. Hematology/oncology followed and held Jakafi and they plan to restart it today. She was noted to have a indeterminate 2 cm lesion in the spleen and hematology/oncology is considering MRI as an outpatient. She did not want any further work-up for her porcelain gallbladder. She has been mostly asymptomatic but did have episodes of right upper quadrant/epigastric pain that improved. She is from an assisted living facility and plans to return there with [facility] today. She is currently on 2 L/min nasal cannula.    Also discussed with her CODE STATUS and goals of care. She wishes to be DNR.    I discussed the patient's findings and my recommendations with patient and nursing staff.    Condition on Discharge: Stable for discharge she would very much like to go home today.     Temp:  [98 °F (36.7 °C)-99.1 °F (37.3 °C)] 98 °F (36.7 °C)  Heart Rate:  [77-92] 81  Resp:  [16-18] 16  BP: ()/(58-91) 110/60  Body mass index is 28.72 kg/m².    Physical Exam  Constitutional:        General: She is not in acute distress.     Appearance: Normal appearance. She is not toxic-appearing.   HENT:      Head: Normocephalic and atraumatic.   Cardiovascular:      Rate and Rhythm: Normal rate and regular rhythm.   Pulmonary:      Effort: Pulmonary effort is normal. No respiratory distress.      Breath sounds: Normal breath sounds. No wheezing, rhonchi or rales.   Abdominal:      General: Bowel sounds are normal.      Palpations: Abdomen is soft.      Tenderness: There is abdominal tenderness (mild) in the right upper quadrant and epigastric area. There is no guarding or rebound.   Musculoskeletal:         General: No swelling.   Skin:     General: Skin is warm and dry.   Neurological:      General: No focal deficit present.      Mental Status: She is alert and oriented to person, place, and time.   Psychiatric:         Mood and Affect: Mood normal.         Behavior: Behavior normal.         Thought Content: Thought content normal.       Disposition: Home or Self Care       Discharge Medications      Continue These Medications      Instructions Start Date   acetaminophen 500 MG tablet  Commonly known as: TYLENOL   500 mg, Oral, As Needed      apixaban 5 MG tablet tablet  Commonly known as: ELIQUIS   5 mg, Oral, Every 12 Hours Scheduled      atorvastatin 80 MG tablet  Commonly known as: LIPITOR   1 tablet, Oral, Nightly      bimatoprost 0.01 % ophthalmic drops  Commonly known as: LUMIGAN   1 drop, Ophthalmic      clopidogrel 75 MG tablet  Commonly known as: PLAVIX   1 tablet, Oral, Daily      cyclobenzaprine 5 MG tablet  Commonly known as: FLEXERIL   5 mg, Oral, Every 8 Hours PRN      Diclofenac Sodium 1 % gel gel  Commonly known as: VOLTAREN   No dose, route, or frequency recorded.      Ear Drops 6.5 % otic solution  Generic drug: carbamide peroxide   INSTILL 10 DROPS IN EACH EAR DAILY FOR 14 DAYS. START LEFT EAR AFTER COMPLETION OF OFLOXACIN      empagliflozin 10 MG tablet tablet  Commonly known as:  JARDIANCE   10 mg, Oral, Daily      furosemide 40 MG tablet  Commonly known as: LASIX   40 mg, Oral, Daily      Jakafi 15 MG chemo tablet  Generic drug: ruxolitinib   15 mg, Oral, 2 Times Daily      latanoprost 0.005 % ophthalmic solution  Commonly known as: XALATAN   No dose, route, or frequency recorded.      metoprolol succinate XL 25 MG 24 hr tablet  Commonly known as: TOPROL-XL   25 mg, 2 Times Daily      ofloxacin 0.3 % otic solution  Commonly known as: FLOXIN   No dose, route, or frequency recorded.      pantoprazole 40 MG EC tablet  Commonly known as: PROTONIX   40 mg, Oral, Daily      sertraline 50 MG tablet  Commonly known as: ZOLOFT   50 mg, Oral, Daily            Diet Instructions     Diet: Regular, Consistent Carbohydrate      Discharge Diet:  Regular  Consistent Carbohydrate            Activity Instructions     Activity as Tolerated           Additional Instructions for the Follow-ups that You Need to Schedule     Ambulatory Referral to Home Health (Hospital)   As directed      Face to Face Visit Date: 11/1/2022    Follow-up provider for Plan of Care?: I treated the patient in an acute care facility and will not continue treatment after discharge.    Follow-up provider: CONCETTA SUGGS [[id number]]    Reason/Clinical Findings: covid    Describe mobility limitations that make leaving home difficult: covid    Nursing/Therapeutic Services Requested: Physical Therapy Skilled Nursing    Skilled nursing orders: O2 instruction    PT orders: Strengthening    Frequency: 1 Week 1         Call MD for problems / concerns.   As directed         Contact information for follow-up providers     Concetta Suggs APRN .    Specialty: Nurse Practitioner  Contact information:  [address] [place], [place] [id number] [phone]                   Contact information for after-discharge care     Destination     [facility] .    Service: Assisted Living  Contact information:  [address]  [facility] [address]  [phone]                 Durable Medical Equipment     [facility] .    Service: Durable Medical Equipment  Contact information:  [address]  [facility] [id number] [phone]                 Home Medical Care     [facility] .    Service: Home Health Services  Contact information:  [address]  [facility] [address] [phone]                            Pending Labs     Order Current Status    Basic Metabolic Panel Collected (11/01/22 0831)    CBC & Differential Collected (11/01/22 0831)    CBC Auto Differential Collected (11/01/22 0831)    Hepatic Function Panel Collected (11/01/22 0831)         Roldan Merritt MD  [facility]  11/01/22    Discharge time spent greater than 30 minutes.      Electronically signed by Roldan Merritt MD at 11/01/22 0847       Discharge Order (From admission, onward)     Start     Ordered    11/01/22 0745  Discharge patient  Once        Expected Discharge Date: 11/01/22    Discharge Disposition: Home or Self Care    Physician of Record for Attribution - Please select from Treatment Team: ROLDAN MERRITT [[id number]]    Review needed by CMO to determine Physician of Record: No       Question Answer Comment   Physician of Record for Attribution - Please select from Treatment Team ROLDAN MERRITT    Review needed by CMO to determine Physician of Record No        11/01/22 0749

## 2022-11-02 NOTE — OUTREACH NOTE
COVID-19 Week 1 Survey    Flowsheet Row Responses   [facility] facility patient discharged from? [place]   Does the patient have one of the following disease processes/diagnoses(primary or secondary)? COVID-19   COVID-19 underlying condition? None   Call Number Call 1   Week 1 Call successful? No   Discharge diagnosis COVID          DENICE S - Registered Nurse

## 2022-11-02 NOTE — OUTREACH NOTE
Prep Survey    Flowsheet Row Responses   Mandaeism facility patient discharged from? [facility]   Is LACE score < 7 ? No   Emergency Room discharge w/ pulse ox? No   Eligibility Readm Mgmt   Discharge diagnosis COVID   Does the patient have one of the following disease processes/diagnoses(primary or secondary)? COVID-19   Does the patient have Home health ordered? Yes   What is the Home health agency?   [facility]    Is there a DME ordered? Yes   What DME was ordered? 02 through [facility]   Prep survey completed? Yes          ALEXIS SCHAEFER - Registered Nurse

## 2022-11-03 NOTE — CASE MANAGEMENT/SOCIAL WORK
Case Management Discharge Note      Final Note: Home to Assisted Living at [facility] (previously [facility]) with [facility] and O2 from [facility].   T.BEBE PATELW    Provided Post Acute Provider List?: N/A  N/A Provider List Comment: Current with [facility]  Provided Post Acute Provider Quality & Resource List?: N/A  N/A Quality & Resource List Comment: Current with [facility]    Selected Continued Care - Discharged on 11/1/2022 Admission date: 10/29/2022 - Discharge disposition: Home or Self Care    Destination Coordination complete.    Service Provider Selected Services Address Phone Fax Patient Preferred    [facility] [address] [facility] [place] [address] [phone] --          Durable Medical Equipment     Service Provider Selected Services Address Phone Fax Patient Preferred    [facility] [address], [place] [facility] [address] [phone] --          Dialysis/Infusion    No services have been selected for the patient.              Home Medical Care     Service Provider Selected Services Address Phone Fax Patient Preferred    [facility] [address], [place] [address] [phone] --          Therapy    No services have been selected for the patient.              Community Resources    No services have been selected for the patient.              Community & DME    No services have been selected for the patient.                Selected Continued Care - Episodes Includes continued care and service providers with selected services from the active episodes listed below    Oncology Episode start date: 5/18/2022   There are no active outsourced providers for this episode.             Selected Continued Care - Prior Encounters Includes continued care and service providers with selected services from prior  encounters from 7/31/2022 to 11/1/2022    Discharged on 9/4/2022 Admission date: 8/31/2022 - Discharge disposition: Home or Self Care    Destination     Service Provider Selected Services Address Phone Fax Patient Preferred    [facility] [place] [id number] [facility] [id number] [phone] --          Durable Medical Equipment     Service Provider Selected Services Address Phone Fax Patient Preferred    [facility] [address] [address] [phone] --                    Transportation Services  Private: Car    Final Discharge Disposition Code: 06 - home with home health care

## 2022-11-04 ENCOUNTER — READMISSION MANAGEMENT (OUTPATIENT)
Dept: CALL CENTER | Facility: HOSPITAL | Age: 82
End: 2022-11-04

## 2022-11-04 NOTE — OUTREACH NOTE
COVID-19 Week 1 Survey    Flowsheet Row Responses   [facility] facility patient discharged from? [place]   Does the patient have one of the following disease processes/diagnoses(primary or secondary)? COVID-19   COVID-19 underlying condition? None   Call Number Call 2   Week 1 Call successful? No   Discharge diagnosis PAULA MEZA - Registered Nurse

## 2022-11-08 ENCOUNTER — OFFICE VISIT (OUTPATIENT)
Dept: ONCOLOGY | Facility: CLINIC | Age: 82
End: 2022-11-08

## 2022-11-08 ENCOUNTER — LAB (OUTPATIENT)
Dept: LAB | Facility: HOSPITAL | Age: 82
End: 2022-11-08

## 2022-11-08 VITALS
BODY MASS INDEX: 28.66 KG/M2 | WEIGHT: 151.8 LBS | RESPIRATION RATE: 16 BRPM | HEIGHT: 61 IN | OXYGEN SATURATION: 92 % | TEMPERATURE: 96.8 F | SYSTOLIC BLOOD PRESSURE: 136 MMHG | HEART RATE: 110 BPM | DIASTOLIC BLOOD PRESSURE: 84 MMHG

## 2022-11-08 DIAGNOSIS — D47.1 MYELOPROLIFERATIVE DISORDER: ICD-10-CM

## 2022-11-08 DIAGNOSIS — U07.1 COVID: ICD-10-CM

## 2022-11-08 DIAGNOSIS — D47.1 MYELOPROLIFERATIVE DISORDER: Primary | ICD-10-CM

## 2022-11-08 DIAGNOSIS — D50.9 MICROCYTIC ANEMIA: ICD-10-CM

## 2022-11-08 LAB
ALBUMIN SERPL-MCNC: 4.4 G/DL (ref 3.5–5.2)
ALBUMIN/GLOB SERPL: 1.4 G/DL (ref 1.1–2.4)
ALP SERPL-CCNC: 154 U/L (ref 38–116)
ALT SERPL W P-5'-P-CCNC: 19 U/L (ref 0–33)
ANION GAP SERPL CALCULATED.3IONS-SCNC: 16.3 MMOL/L (ref 5–15)
AST SERPL-CCNC: 15 U/L (ref 0–32)
BASOPHILS # BLD AUTO: 0.34 10*3/MM3 (ref 0–0.2)
BASOPHILS NFR BLD AUTO: 0.9 % (ref 0–1.5)
BILIRUB SERPL-MCNC: 0.4 MG/DL (ref 0.2–1.2)
BUN SERPL-MCNC: 16 MG/DL (ref 6–20)
BUN/CREAT SERPL: 14.7 (ref 7.3–30)
CALCIUM SPEC-SCNC: 9.7 MG/DL (ref 8.5–10.2)
CHLORIDE SERPL-SCNC: 94 MMOL/L (ref 98–107)
CO2 SERPL-SCNC: 26.7 MMOL/L (ref 22–29)
CREAT SERPL-MCNC: 1.09 MG/DL (ref 0.6–1.1)
DEPRECATED RDW RBC AUTO: 60.2 FL (ref 37–54)
EGFRCR SERPLBLD CKD-EPI 2021: 50.2 ML/MIN/1.73
EOSINOPHIL # BLD AUTO: 0.31 10*3/MM3 (ref 0–0.4)
EOSINOPHIL NFR BLD AUTO: 0.8 % (ref 0.3–6.2)
ERYTHROCYTE [DISTWIDTH] IN BLOOD BY AUTOMATED COUNT: 16.1 % (ref 12.3–15.4)
GLOBULIN UR ELPH-MCNC: 3.2 GM/DL (ref 1.8–3.5)
GLUCOSE SERPL-MCNC: 213 MG/DL (ref 74–124)
HCT VFR BLD AUTO: 34.3 % (ref 34–46.6)
HGB BLD-MCNC: 11.1 G/DL (ref 12–15.9)
IMM GRANULOCYTES # BLD AUTO: 2.31 10*3/MM3 (ref 0–0.05)
IMM GRANULOCYTES NFR BLD AUTO: 5.8 % (ref 0–0.5)
LYMPHOCYTES # BLD AUTO: 3.8 10*3/MM3 (ref 0.7–3.1)
LYMPHOCYTES NFR BLD AUTO: 9.6 % (ref 19.6–45.3)
MCH RBC QN AUTO: 32.7 PG (ref 26.6–33)
MCHC RBC AUTO-ENTMCNC: 32.4 G/DL (ref 31.5–35.7)
MCV RBC AUTO: 101.2 FL (ref 79–97)
MONOCYTES # BLD AUTO: 6.13 10*3/MM3 (ref 0.1–0.9)
MONOCYTES NFR BLD AUTO: 15.5 % (ref 5–12)
NEUTROPHILS NFR BLD AUTO: 26.66 10*3/MM3 (ref 1.7–7)
NEUTROPHILS NFR BLD AUTO: 67.4 % (ref 42.7–76)
NRBC BLD AUTO-RTO: 0 /100 WBC (ref 0–0.2)
PLATELET # BLD AUTO: 317 10*3/MM3 (ref 140–450)
PMV BLD AUTO: 11.2 FL (ref 6–12)
POTASSIUM SERPL-SCNC: 3.3 MMOL/L (ref 3.5–4.7)
PROT SERPL-MCNC: 7.6 G/DL (ref 6.3–8)
RBC # BLD AUTO: 3.39 10*6/MM3 (ref 3.77–5.28)
SODIUM SERPL-SCNC: 137 MMOL/L (ref 134–145)
WBC NRBC COR # BLD: 39.55 10*3/MM3 (ref 3.4–10.8)

## 2022-11-08 PROCEDURE — 85025 COMPLETE CBC W/AUTO DIFF WBC: CPT

## 2022-11-08 PROCEDURE — 80053 COMPREHEN METABOLIC PANEL: CPT

## 2022-11-08 PROCEDURE — 99214 OFFICE O/P EST MOD 30 MIN: CPT | Performed by: NURSE PRACTITIONER

## 2022-11-08 PROCEDURE — 36415 COLL VENOUS BLD VENIPUNCTURE: CPT

## 2022-11-08 NOTE — PROGRESS NOTES
"[facility] OUTPATIENT FOLLOW UP CLINIC VISIT    REASON FOR FOLLOW-UP:    Myelofibrosis, on Jakafi    HISTORY OF PRESENT ILLNESS:  Catherine Boyer is a 82 y.o. female with the above-mentioned she returns today for follow-up and lab review as well as hospital return.  Patient was admitted 10/29/2022 -11/1/2022 with COVID-19. Patient states that she remains fatigued, but gets better every day.  She does have oxygen at home and states that she will wear it when she is in her room.  She does not take it down to the dining cohn with her (she does not want to be seen with oxygen at the dining cohn!)  However she also does not have significant short of breath or a feeling she needs it.  She feels like her breathing continues to improve.  She is taking her Jakafi 15 mg twice daily, as prescribed.       REVIEW OF SYSTEMS:  As per HPI    PHYSICAL EXAMINATION:    Vitals:    11/08/22 1520   BP: 136/84   Pulse: 110   Resp: 16   Temp: 96.8 °F (36 °C)   TempSrc: Temporal   SpO2: 92%   Weight: 68.9 kg (151 lb 12.8 oz)   Height: 154.9 cm (60.98\")   PainSc: 0-No pain      Physical Exam  Vitals reviewed.   Constitutional:       General: She is not in acute distress.     Appearance: Normal appearance. She is well-developed.      Comments: Ambulating with a walker   HENT:      Head: Normocephalic and atraumatic.   Eyes:      Pupils: Pupils are equal, round, and reactive to light.   Cardiovascular:      Rate and Rhythm: Normal rate and regular rhythm.      Heart sounds: Normal heart sounds. No murmur heard.  Pulmonary:      Effort: Pulmonary effort is normal. No respiratory distress.      Breath sounds: Normal breath sounds. No wheezing, rhonchi or rales.   Abdominal:      General: Bowel sounds are normal. There is no distension.      Palpations: Abdomen is soft.   Musculoskeletal:         General: Normal range of motion.      Cervical back: Normal range of motion.   Skin:     General: Skin is warm and dry.      Findings: No " rash.   Neurological:      Mental Status: She is alert and oriented to person, place, and time.       DIAGNOSTIC DATA:  CBC & Differential (11/08/2022 15:05)      IMAGING:  None reviewed    ASSESSMENT:  This is a 82 y.o. female with:    *JAK2 positive myeloproliferative disorder, perhaps primary myelofibrosis  · She has been seen by Dr. Gerard Foreman with [facility] specialists and cancer and blood disorders in [place].      · She was seen there initially on 6/4/2021 with leukocytosis with a white blood cell count of 104,000.  Hemoglobin was normal at 11.6 and platelets were normal at 299,000.  PCR for BCR/ABL and FISH for BCR/ABL were negative.  A bone marrow aspiration was attempted on 6/9/2021 which was unsuccessful.  She then went to [place] for the summer.  She was admitted to the [facility] between 6/29/2021 and 7/9/2021.  She had a bone marrow aspiration and biopsy performed there on 6/30/2021 showing a hypercellular marrow at greater than 95% with 1% blasts.  Focal 1+ reticulin fibrosis was noted.  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  PCR for BCR/ABL was negative.  JAK2 V617F PCR was positive.  Cytogenetics were normal.  Next generation myeloid disorder profiling of the bone marrow aspirate from 6/30/2021 showed TET2 [id number] and L1689Szx*47 abnormalities and JAK2 V617F.  Therefore she was suspected to have early primary myelofibrosis and she was started on hydroxyurea 1000 mg daily.  Subsequently, hydroxyurea was held.  Blood counts had improved.  She was admitted to the hospital from 2/9 through 2/15/2022 for symptomatic anemia and chest pain.  Her hemoglobin was 5.5.  The white blood cell count was 7.6.  Platelets were 56,000.  Fecal occult blood testing was negative.  She received 3 units of packed red blood cells.  No endoscopy was performed.  A left heart catheterization was performed with a drug-eluting stent placed to the second diagonal on 2/11/2022 and she was discharged  from that hospitalization on aspirin 81 mg, Plavix 75 mg, and Eliquis 5 mg twice daily.  Blood counts improved by 3/7/2022 and her white blood cell count was 7.2 with a hemoglobin 11.4 and platelets 295,000.  She did have a bone marrow aspiration and biopsy on 3/7/2022 showing chronic myeloproliferative neoplasm with potentially post ET fibrosis or consideration of primary myelofibrosis.  There was no evidence for myelodysplasia.  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  She also has atrial fibrillation and is anticoagulated with Eliquis.    · Hydroxyurea was discontinued and she was started on ruxolitinib 15 mg twice daily at her visit on 3/14/2022 with Dr. Gerard Foreman.  · She had CT imaging of the chest abdomen pelvis on 3/19/2022 that did not demonstrate any splenomegaly.  Calcified mediastinal lymphadenopathy was noted.  Borderline pretracheal and right hilar lymphadenopathy noted.  Small bilateral pleural effusions with bibasilar atelectasis noted.  · She has moved from [place] to [place] and is seen initially in the office on 5/15/2022.  She tolerates ruxolitinib well.  White blood cell count has decreased from 5.9 from 19.6.  Platelets have normalized at 170,000, down from 464,000.  The hemoglobin has also decreased at 8.6 with an MCV of 98.9.  · 5/26/2022: White blood cell count mildly elevated at 12.4 with a normal platelet count at 216,000.  She tolerates Jakafi very well.   · 6/30/2022: White blood cell count a little higher.  Continue monitoring.  · 9/13/2022: White blood cell count higher at 32,000  · Flow cytometry 9/13/2022 with 0.1% myeloblasts with a typical (normal) phenotype.  Nonspecific monocyte population.  · Bone marrow aspiration and biopsy on 10/7/2022 with hypercellular marrow at 95% with involvement by chronic myeloproliferative neoplasm, less than 5% blasts.  Mild reticulin fibrosis.  Decreased  iron stores.  Consideration of CMML.  Flow cytometry showed a dim CD56 positive aberrant monocyte population at 8.2 percent of events.  Cytogenetics pending.  NGS pending.  · 10/26/2022: White blood cell count improved at 21,000.  • Patient admitted 10/29/2022 with COVID-19 infection.  Jakafi held.  • Resumed Arjalc44 mg twice daily on 11/1/2022.  • WBC 11/1/2022 increased from 23 up to 47, likely reflecting her being off of Jakafi briefly and also potentially related to response to current COVID-19 infection.  She is now back on her home medication.  She is being discharged home today.    • Patient seen in follow-up 11/8/2022 continuing on Jakafi 15 mg twice daily, WBC today improved to 39.55.     *Microcytic anemia  · Hemoglobin on 5/5/2022 was declining at 8.6.  · Ferritin 242 with an iron of 105 and 33% iron saturation, ferritin vitamin B12 1107, folic acid 15.1, normal RBC folate, creatinine 0.86,   · 5/26/2022: Hemoglobin stable at 8.5.  The reticulocyte count is a little bit more elevated.  I am hopeful that the hemoglobin will improve.  If it does not we will need to consider Procrit.  · 6/30/2022: Hemoglobin improving at 9.5 with reticulocyte count of 3%.   · 9/13/2022: Hemoglobin improving at 10.4  · 9/27/2022: Hemoglobin stable at 10.1  · 10/26/2022: Hemoglobin improved at 10.8  • Hemoglobin has declined slightly during current admission for COVID-19 infection, currently 10.0 on 11/1/2022.  • 11/8/2022 hemoglobin improved to 11.1.     *History of marantic endocarditis and atrial fibrillation  · Anticoagulated with Eliquis 5 mg twice daily     *Recent admission with heart failure, positive stress test and LEFTY to a large diagonal branch for in sent stenosis.    *Segment occlusion of both auditory canals: She is using some antibiotic eardrops currently and is going to be using some different eardrops directed at the cerumen in the near future.  She has plans to go back to primary care then for cerumen  removal.    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  She does not wish to pursue further evaluation.  She did have some brief epigastric/right upper quadrant pain which has now resolved.     *Splenic lesion on CT  • CT abdomen and pelvis 10/29/2022 with 2 cm low-attenuation lesion in the spleen, indeterminant  • Consider outpatient MRI for further evaluation    *COVID-19 infection  • Patient presented to ER on 10/29/2022 with progressive generalized weakness x10 days  • Patient tested positive for COVID-19 on admission 10/29/2022  • Elevated lactate 3.0  • Chest x-ray 10/29/2022 with no evidence of consolidation  • Patient was started on dexamethasone and remdesivir  • Patient requiring 2 L O2 nasal cannula (requires 2 L O2 nasal cannula at night chronically).  Plans for patient to go home on oxygen 2 L nasal cannula continuously for now.  • Reports 11/8/2022 she is only using oxygen while she is in her room.  She is able to ambulate throughout the facility she lives and without her oxygen and feels like she does well.    PLAN:   1. Continue Jakafi 15 mg twice daily.  2. Continue Eliquis 5 mg twice daily and Plavix 75 mg daily.  3. Return for follow-up visit in 2 weeks with Dr. Reinoso with repeat CBC, CMP.  4. Call/return sooner should she develop any new concerns or problems.      High risk medication requiring intensive monitoring    Melanie Siddiqui, ABEL  11/08/2022

## 2022-11-11 ENCOUNTER — READMISSION MANAGEMENT (OUTPATIENT)
Dept: CALL CENTER | Facility: HOSPITAL | Age: 82
End: 2022-11-11

## 2022-11-11 NOTE — OUTREACH NOTE
COVID-19 Week 2 Survey    Flowsheet Row Responses   [facility] facility patient discharged from? [place]   Does the patient have one of the following disease processes/diagnoses(primary or secondary)? COVID-19   COVID-19 underlying condition? None   Call Number Call 1   COVID-19 Week 2: Call 1 attempt successful? No   Discharge diagnosis PAULA MEZA - Registered Nurse

## 2022-11-17 RX ORDER — APIXABAN 5 MG/1
TABLET, FILM COATED ORAL
Qty: 60 TABLET | Refills: 0 | OUTPATIENT
Start: 2022-11-17

## 2022-11-18 ENCOUNTER — OFFICE VISIT (OUTPATIENT)
Dept: SLEEP MEDICINE | Facility: HOSPITAL | Age: 82
End: 2022-11-18

## 2022-11-18 VITALS
HEIGHT: 61 IN | BODY MASS INDEX: 28.51 KG/M2 | WEIGHT: 151 LBS | HEART RATE: 79 BPM | DIASTOLIC BLOOD PRESSURE: 57 MMHG | SYSTOLIC BLOOD PRESSURE: 104 MMHG | OXYGEN SATURATION: 96 %

## 2022-11-18 DIAGNOSIS — R09.02 HYPOXEMIA REQUIRING SUPPLEMENTAL OXYGEN: ICD-10-CM

## 2022-11-18 DIAGNOSIS — I50.32 CHRONIC DIASTOLIC (CONGESTIVE) HEART FAILURE: ICD-10-CM

## 2022-11-18 DIAGNOSIS — G47.33 OSA (OBSTRUCTIVE SLEEP APNEA): ICD-10-CM

## 2022-11-18 DIAGNOSIS — Z99.81 HYPOXEMIA REQUIRING SUPPLEMENTAL OXYGEN: ICD-10-CM

## 2022-11-18 DIAGNOSIS — G45.9 TIA (TRANSIENT ISCHEMIC ATTACK): Primary | ICD-10-CM

## 2022-11-18 PROCEDURE — G0463 HOSPITAL OUTPT CLINIC VISIT: HCPCS

## 2022-11-18 RX ORDER — ZOLPIDEM TARTRATE 5 MG/1
5 TABLET ORAL NIGHTLY PRN
Qty: 1 TABLET | Refills: 0 | Status: ON HOLD | OUTPATIENT
Start: 2022-11-18 | End: 2023-02-04

## 2022-11-18 RX ORDER — APIXABAN 5 MG/1
TABLET, FILM COATED ORAL
Qty: 60 TABLET | Refills: 0 | Status: SHIPPED | OUTPATIENT
Start: 2022-11-18 | End: 2022-11-18 | Stop reason: SDUPTHER

## 2022-11-18 NOTE — PROGRESS NOTES
[facility]  Telephone: [phone] / Fax: [phone] [place]  Telephone: [phone] / Fax: [phone] Lory Christensen    Referring Physician: Kristi Moreau APRN  PCP: Kristi Moreau APRN    Reason for consult:  sleep apnea    Catherine Boyer is a 82 y.o.female  was seen in the [facility] Disorders Center today for evaluation of sleep apnea. She is here for evaluation of LAMONT in view of TIA, nocturnal hypoxia requiring O2 with sleep at 2L She has been on supplemental O2 since hospitalization for MI about 1 month ago. 10 days ago she was admitted for COVID and gallbladder issues and was asked to use O2 during the day. Since then she has been feeling tired/sleepy. She has had lack of stamina.  She reports difficulty falling asleep.     SH- former smoker age [age], 2 soda per day    ROS- +frequent urination, +myalgias, rest is negative.    Catherine Boyer  has a past medical history of Atrial fibrillation (HCC), Breast cancer (HCC), CAD (coronary artery disease), Carotid atherosclerosis, Chronic diastolic (congestive) heart failure (HCC), GERD (gastroesophageal reflux disease), Glaucoma, History of cataract, Hypertension, Microcytic anemia, Myeloproliferative disorder (HCC), Nonbacterial thrombotic endocarditis, Porcelain gallbladder, PUD (peptic ulcer disease), TIA (transient ischemic attack), Type 2 diabetes mellitus (HCC), and Upper GI bleed.    Current Medications:    Current Outpatient Medications:   •  acetaminophen (TYLENOL) 500 MG tablet, Take 500 mg by mouth As Needed., Disp: , Rfl:   •  atorvastatin (LIPITOR) 80 MG tablet, Take 1 tablet by mouth Every Night., Disp: , Rfl:   •  bimatoprost (LUMIGAN) 0.01 % ophthalmic drops, Apply 1 drop to eye(s) as directed by provider., Disp: , Rfl:   •  clopidogrel (PLAVIX) 75 MG tablet, Take 1 tablet by mouth Daily., Disp: , Rfl:   •  cyclobenzaprine (FLEXERIL) 5 MG tablet, Take 5 mg by mouth Every 8 (Eight) Hours As Needed., Disp: , Rfl:   •   "Diclofenac Sodium (VOLTAREN) 1 % gel gel, , Disp: , Rfl:   •  Ear Drops 6.5 % otic solution, INSTILL 10 DROPS IN EACH EAR DAILY FOR 14 DAYS. START LEFT EAR AFTER COMPLETION OF OFLOXACIN, Disp: , Rfl:   •  Eliquis 5 MG tablet tablet, TAKE 1 TABLET BY MOUTH EVERY TWELVE HOURS, Disp: 60 tablet, Rfl: 0  •  empagliflozin (JARDIANCE) 10 MG tablet tablet, Take 1 tablet by mouth Daily., Disp: 90 tablet, Rfl: 3  •  furosemide (LASIX) 40 MG tablet, Take 1 tablet by mouth Daily., Disp: 90 tablet, Rfl: 3  •  Jakafi 15 MG chemo tablet, Take 1 tablet by mouth 2 (Two) Times a Day., Disp: 60 tablet, Rfl: 3  •  latanoprost (XALATAN) 0.005 % ophthalmic solution, , Disp: , Rfl:   •  metoprolol succinate XL (TOPROL-XL) 25 MG 24 hr tablet, 25 mg 2 (Two) Times a Day., Disp: , Rfl:   •  ofloxacin (FLOXIN) 0.3 % otic solution, , Disp: , Rfl:   •  pantoprazole (PROTONIX) 40 MG EC tablet, Take 40 mg by mouth Daily., Disp: , Rfl:   •  sertraline (ZOLOFT) 50 MG tablet, Take 50 mg by mouth Daily., Disp: , Rfl:     I have reviewed Past Medical History, Past Surgical History, Medication List, Social History and Family History as entered in Sleep Questionnaire and EPIC.    ESS  11   Vital Signs /57   Pulse 79   Ht 154.9 cm (60.98\")   Wt 68.5 kg (151 lb)   SpO2 96%   BMI 28.55 kg/m²  Body mass index is 28.55 kg/m².    General Alert and oriented. No acute distress noted   Pharynx/Throat Class III  Mallampati airway, large tongue, no evidence of redundant lateral pharyngeal tissue. No oral lesions. No thrush. Moist mucous membranes.   Head Normocephalic. Symmetrical. Atraumatic.    Nose No septal deviation. No drainage   Chest Wall Normal shape. Symmetric expansion with respiration. No tenderness.   Neck Trachea midline, no thyromegaly or adenopathy    Lungs Clear to auscultation bilaterally. No wheezes. No rhonchi. No rales. Respirations regular, even and unlabored.   Heart Regular rhythm and normal rate. Normal S1 and S2. No murmur "   Abdomen Soft, non-tender and non-distended. Normal bowel sounds. No masses.   Extremities Moves all extremities well. No edema   Psychiatric Normal mood and affect.        Impression:  1. TIA (transient ischemic attack)    2. Hypoxemia requiring supplemental oxygen    3. LAMONT (obstructive sleep apnea)    4. Chronic diastolic (congestive) heart failure (HCC)          Plan:  In view of cardiac history and TIA, +supplemental O2 with sleep, Catherine requires in lab split night PSG for comprehensive assessment.  I discussed the pathophysiology of obstructive sleep apnea with the patient.  We discussed the adverse outcomes associated with untreated sleep-disordered breathing.  We discussed treatment modalities of obstructive sleep apnea including CPAP device. Sleep study will be scheduled to establish a definitive diagnosis of sleep disorder breathing.  Weight loss will be strongly beneficial in order to reduce the severity of sleep-disordered breathing.  Patient has narrow oropharyngeal structure.  Caution during activities that require prolonged concentration is strongly advised.  After sleep study results are available, patient will be notified, and appointment will be scheduled to discuss sleep study results and treatment recommendations.    Rx for Ambien 5mg x 1 was provided for in lab polysomnogram. Patient was instructed to bring the Ambien tablet to the sleep lab and take at lights out . Strict instructions were given to NOT take the Ambien at home.      I appreciate the opportunity to participate in this patient's care.      ABEL Crabtree  [facility]  Phone: [phone]      Part of this note may be an electronic transcription/translation of spoken language to printed text using the Dragon Dictation System. Some errors may exist even though the document was edited.

## 2022-11-21 ENCOUNTER — APPOINTMENT (OUTPATIENT)
Dept: ULTRASOUND IMAGING | Facility: HOSPITAL | Age: 82
End: 2022-11-21

## 2022-11-21 ENCOUNTER — HOSPITAL ENCOUNTER (OUTPATIENT)
Facility: HOSPITAL | Age: 82
Setting detail: OBSERVATION
Discharge: HOME | End: 2022-11-22
Attending: EMERGENCY MEDICINE | Admitting: STUDENT IN AN ORGANIZED HEALTH CARE EDUCATION/TRAINING PROGRAM

## 2022-11-21 DIAGNOSIS — Z79.01 ON ANTICOAGULANT THERAPY: ICD-10-CM

## 2022-11-21 DIAGNOSIS — K82.8 PORCELAIN GALLBLADDER: Primary | ICD-10-CM

## 2022-11-21 DIAGNOSIS — K81.9 CHOLECYSTITIS: ICD-10-CM

## 2022-11-21 DIAGNOSIS — K80.50 BILIARY COLIC: ICD-10-CM

## 2022-11-21 PROBLEM — N39.0 ACUTE UTI (URINARY TRACT INFECTION): Status: ACTIVE | Noted: 2022-11-21

## 2022-11-21 LAB
ALBUMIN SERPL-MCNC: 4 G/DL (ref 3.5–5.2)
ALBUMIN/GLOB SERPL: 1.2 G/DL
ALP SERPL-CCNC: 165 U/L (ref 39–117)
ALT SERPL W P-5'-P-CCNC: 13 U/L (ref 1–33)
ANION GAP SERPL CALCULATED.3IONS-SCNC: 13.5 MMOL/L (ref 5–15)
AST SERPL-CCNC: 13 U/L (ref 1–32)
BACTERIA UR QL AUTO: ABNORMAL /HPF
BASOPHILS # BLD MANUAL: 0.36 10*3/MM3 (ref 0–0.2)
BASOPHILS NFR BLD MANUAL: 1 % (ref 0–1.5)
BILIRUB SERPL-MCNC: 0.5 MG/DL (ref 0–1.2)
BILIRUB UR QL STRIP: NEGATIVE
BUN SERPL-MCNC: 21 MG/DL (ref 8–23)
BUN/CREAT SERPL: 19.1 (ref 7–25)
CALCIUM SPEC-SCNC: 9.1 MG/DL (ref 8.6–10.5)
CHLORIDE SERPL-SCNC: 96 MMOL/L (ref 98–107)
CLARITY UR: ABNORMAL
CO2 SERPL-SCNC: 26.5 MMOL/L (ref 22–29)
COLOR UR: YELLOW
CREAT SERPL-MCNC: 1.1 MG/DL (ref 0.57–1)
D-LACTATE SERPL-SCNC: 1.4 MMOL/L (ref 0.5–2)
D-LACTATE SERPL-SCNC: 2.1 MMOL/L (ref 0.5–2)
DEPRECATED RDW RBC AUTO: 56 FL (ref 37–54)
EGFRCR SERPLBLD CKD-EPI 2021: 49.7 ML/MIN/1.73
ERYTHROCYTE [DISTWIDTH] IN BLOOD BY AUTOMATED COUNT: 15.9 % (ref 12.3–15.4)
GLOBULIN UR ELPH-MCNC: 3.3 GM/DL
GLUCOSE BLDC GLUCOMTR-MCNC: 102 MG/DL (ref 70–130)
GLUCOSE BLDC GLUCOMTR-MCNC: 141 MG/DL (ref 70–130)
GLUCOSE BLDC GLUCOMTR-MCNC: 96 MG/DL (ref 70–130)
GLUCOSE BLDC GLUCOMTR-MCNC: 96 MG/DL (ref 70–130)
GLUCOSE SERPL-MCNC: 159 MG/DL (ref 65–99)
GLUCOSE UR STRIP-MCNC: ABNORMAL MG/DL
HCT VFR BLD AUTO: 30.6 % (ref 34–46.6)
HGB BLD-MCNC: 10.5 G/DL (ref 12–15.9)
HGB UR QL STRIP.AUTO: NEGATIVE
HOLD SPECIMEN: NORMAL
HOLD SPECIMEN: NORMAL
HYALINE CASTS UR QL AUTO: ABNORMAL /LPF
KETONES UR QL STRIP: ABNORMAL
LEUKOCYTE ESTERASE UR QL STRIP.AUTO: ABNORMAL
LIPASE SERPL-CCNC: 20 U/L (ref 13–60)
LYMPHOCYTES # BLD MANUAL: 1.13 10*3/MM3 (ref 0.7–3.1)
LYMPHOCYTES NFR BLD MANUAL: 5.1 % (ref 5–12)
MCH RBC QN AUTO: 32.7 PG (ref 26.6–33)
MCHC RBC AUTO-ENTMCNC: 34.3 G/DL (ref 31.5–35.7)
MCV RBC AUTO: 95.3 FL (ref 79–97)
MONOCYTES # BLD: 1.85 10*3/MM3 (ref 0.1–0.9)
NEUTROPHILS # BLD AUTO: 32.96 10*3/MM3 (ref 1.7–7)
NEUTROPHILS NFR BLD MANUAL: 90.8 % (ref 42.7–76)
NITRITE UR QL STRIP: POSITIVE
PH UR STRIP.AUTO: <=5 [PH] (ref 5–8)
PLAT MORPH BLD: NORMAL
PLATELET # BLD AUTO: 220 10*3/MM3 (ref 140–450)
PMV BLD AUTO: 11.5 FL (ref 6–12)
POTASSIUM SERPL-SCNC: 3.7 MMOL/L (ref 3.5–5.2)
PROT SERPL-MCNC: 7.3 G/DL (ref 6–8.5)
PROT UR QL STRIP: ABNORMAL
QT INTERVAL: 389 MS
RBC # BLD AUTO: 3.21 10*6/MM3 (ref 3.77–5.28)
RBC # UR STRIP: ABNORMAL /HPF
RBC MORPH BLD: NORMAL
REF LAB TEST METHOD: ABNORMAL
SODIUM SERPL-SCNC: 136 MMOL/L (ref 136–145)
SP GR UR STRIP: >=1.03 (ref 1–1.03)
SQUAMOUS #/AREA URNS HPF: ABNORMAL /HPF
TROPONIN T SERPL-MCNC: 0.01 NG/ML (ref 0–0.03)
UROBILINOGEN UR QL STRIP: ABNORMAL
VARIANT LYMPHS NFR BLD MANUAL: 3.1 % (ref 19.6–45.3)
WBC # UR STRIP: ABNORMAL /HPF
WBC MORPH BLD: NORMAL
WBC NRBC COR # BLD: 36.3 10*3/MM3 (ref 3.4–10.8)
WHOLE BLOOD HOLD COAG: NORMAL
WHOLE BLOOD HOLD SPECIMEN: NORMAL

## 2022-11-21 PROCEDURE — 93005 ELECTROCARDIOGRAM TRACING: CPT | Performed by: PHYSICIAN ASSISTANT

## 2022-11-21 PROCEDURE — 83605 ASSAY OF LACTIC ACID: CPT | Performed by: EMERGENCY MEDICINE

## 2022-11-21 PROCEDURE — 96375 TX/PRO/DX INJ NEW DRUG ADDON: CPT

## 2022-11-21 PROCEDURE — 96376 TX/PRO/DX INJ SAME DRUG ADON: CPT

## 2022-11-21 PROCEDURE — 80053 COMPREHEN METABOLIC PANEL: CPT | Performed by: EMERGENCY MEDICINE

## 2022-11-21 PROCEDURE — 82962 GLUCOSE BLOOD TEST: CPT

## 2022-11-21 PROCEDURE — 85007 BL SMEAR W/DIFF WBC COUNT: CPT | Performed by: EMERGENCY MEDICINE

## 2022-11-21 PROCEDURE — G0378 HOSPITAL OBSERVATION PER HR: HCPCS

## 2022-11-21 PROCEDURE — 93010 ELECTROCARDIOGRAM REPORT: CPT | Performed by: INTERNAL MEDICINE

## 2022-11-21 PROCEDURE — 76705 ECHO EXAM OF ABDOMEN: CPT

## 2022-11-21 PROCEDURE — 85025 COMPLETE CBC W/AUTO DIFF WBC: CPT | Performed by: EMERGENCY MEDICINE

## 2022-11-21 PROCEDURE — 25010000002 ONDANSETRON PER 1 MG: Performed by: PHYSICIAN ASSISTANT

## 2022-11-21 PROCEDURE — 25010000002 MORPHINE PER 10 MG: Performed by: EMERGENCY MEDICINE

## 2022-11-21 PROCEDURE — 87088 URINE BACTERIA CULTURE: CPT | Performed by: NURSE PRACTITIONER

## 2022-11-21 PROCEDURE — 87086 URINE CULTURE/COLONY COUNT: CPT | Performed by: NURSE PRACTITIONER

## 2022-11-21 PROCEDURE — 99219 PR INITIAL OBSERVATION CARE/DAY 50 MINUTES: CPT | Performed by: INTERNAL MEDICINE

## 2022-11-21 PROCEDURE — 25010000002 ONDANSETRON PER 1 MG: Performed by: EMERGENCY MEDICINE

## 2022-11-21 PROCEDURE — 96361 HYDRATE IV INFUSION ADD-ON: CPT

## 2022-11-21 PROCEDURE — 99214 OFFICE O/P EST MOD 30 MIN: CPT | Performed by: STUDENT IN AN ORGANIZED HEALTH CARE EDUCATION/TRAINING PROGRAM

## 2022-11-21 PROCEDURE — 84484 ASSAY OF TROPONIN QUANT: CPT | Performed by: PHYSICIAN ASSISTANT

## 2022-11-21 PROCEDURE — 83690 ASSAY OF LIPASE: CPT | Performed by: EMERGENCY MEDICINE

## 2022-11-21 PROCEDURE — 25010000002 CEFTRIAXONE PER 250 MG: Performed by: NURSE PRACTITIONER

## 2022-11-21 PROCEDURE — 81001 URINALYSIS AUTO W/SCOPE: CPT | Performed by: EMERGENCY MEDICINE

## 2022-11-21 PROCEDURE — 99204 OFFICE O/P NEW MOD 45 MIN: CPT | Performed by: SURGERY

## 2022-11-21 PROCEDURE — 96365 THER/PROPH/DIAG IV INF INIT: CPT

## 2022-11-21 PROCEDURE — 99285 EMERGENCY DEPT VISIT HI MDM: CPT

## 2022-11-21 PROCEDURE — 87186 SC STD MICRODIL/AGAR DIL: CPT | Performed by: NURSE PRACTITIONER

## 2022-11-21 RX ORDER — CYCLOBENZAPRINE HCL 10 MG
5 TABLET ORAL EVERY 8 HOURS PRN
Status: DISCONTINUED | OUTPATIENT
Start: 2022-11-21 | End: 2022-11-22 | Stop reason: HOSPADM

## 2022-11-21 RX ORDER — NICOTINE POLACRILEX 4 MG
15 LOZENGE BUCCAL
Status: DISCONTINUED | OUTPATIENT
Start: 2022-11-21 | End: 2022-11-22 | Stop reason: HOSPADM

## 2022-11-21 RX ORDER — ONDANSETRON 2 MG/ML
4 INJECTION INTRAMUSCULAR; INTRAVENOUS EVERY 6 HOURS PRN
Status: DISCONTINUED | OUTPATIENT
Start: 2022-11-21 | End: 2022-11-22 | Stop reason: HOSPADM

## 2022-11-21 RX ORDER — ATORVASTATIN CALCIUM 80 MG/1
80 TABLET, FILM COATED ORAL NIGHTLY
Status: DISCONTINUED | OUTPATIENT
Start: 2022-11-21 | End: 2022-11-22 | Stop reason: HOSPADM

## 2022-11-21 RX ORDER — SODIUM CHLORIDE 0.9 % (FLUSH) 0.9 %
10 SYRINGE (ML) INJECTION EVERY 12 HOURS SCHEDULED
Status: DISCONTINUED | OUTPATIENT
Start: 2022-11-21 | End: 2022-11-22 | Stop reason: HOSPADM

## 2022-11-21 RX ORDER — MORPHINE SULFATE 2 MG/ML
2 INJECTION, SOLUTION INTRAMUSCULAR; INTRAVENOUS
Status: DISCONTINUED | OUTPATIENT
Start: 2022-11-21 | End: 2022-11-22 | Stop reason: HOSPADM

## 2022-11-21 RX ORDER — METOPROLOL SUCCINATE 25 MG/1
25 TABLET, EXTENDED RELEASE ORAL 2 TIMES DAILY
Status: DISCONTINUED | OUTPATIENT
Start: 2022-11-21 | End: 2022-11-22 | Stop reason: HOSPADM

## 2022-11-21 RX ORDER — ONDANSETRON 2 MG/ML
4 INJECTION INTRAMUSCULAR; INTRAVENOUS ONCE
Status: COMPLETED | OUTPATIENT
Start: 2022-11-21 | End: 2022-11-21

## 2022-11-21 RX ORDER — ACETAMINOPHEN 160 MG/5ML
650 SOLUTION ORAL EVERY 4 HOURS PRN
Status: DISCONTINUED | OUTPATIENT
Start: 2022-11-21 | End: 2022-11-22 | Stop reason: HOSPADM

## 2022-11-21 RX ORDER — SODIUM CHLORIDE 9 MG/ML
40 INJECTION, SOLUTION INTRAVENOUS AS NEEDED
Status: DISCONTINUED | OUTPATIENT
Start: 2022-11-21 | End: 2022-11-22 | Stop reason: HOSPADM

## 2022-11-21 RX ORDER — DEXTROSE MONOHYDRATE 25 G/50ML
25 INJECTION, SOLUTION INTRAVENOUS
Status: DISCONTINUED | OUTPATIENT
Start: 2022-11-21 | End: 2022-11-22 | Stop reason: HOSPADM

## 2022-11-21 RX ORDER — SODIUM CHLORIDE 0.9 % (FLUSH) 0.9 %
10 SYRINGE (ML) INJECTION AS NEEDED
Status: DISCONTINUED | OUTPATIENT
Start: 2022-11-21 | End: 2022-11-22 | Stop reason: HOSPADM

## 2022-11-21 RX ORDER — ACETAMINOPHEN 650 MG/1
650 SUPPOSITORY RECTAL EVERY 4 HOURS PRN
Status: DISCONTINUED | OUTPATIENT
Start: 2022-11-21 | End: 2022-11-22 | Stop reason: HOSPADM

## 2022-11-21 RX ORDER — SODIUM CHLORIDE 9 MG/ML
75 INJECTION, SOLUTION INTRAVENOUS CONTINUOUS
Status: DISCONTINUED | OUTPATIENT
Start: 2022-11-21 | End: 2022-11-22 | Stop reason: HOSPADM

## 2022-11-21 RX ORDER — LATANOPROST 50 UG/ML
1 SOLUTION/ DROPS OPHTHALMIC NIGHTLY
Status: DISCONTINUED | OUTPATIENT
Start: 2022-11-21 | End: 2022-11-22 | Stop reason: HOSPADM

## 2022-11-21 RX ORDER — FAMOTIDINE 10 MG/ML
20 INJECTION, SOLUTION INTRAVENOUS EVERY 12 HOURS SCHEDULED
Status: DISCONTINUED | OUTPATIENT
Start: 2022-11-21 | End: 2022-11-22 | Stop reason: HOSPADM

## 2022-11-21 RX ORDER — INSULIN LISPRO 100 [IU]/ML
0-9 INJECTION, SOLUTION INTRAVENOUS; SUBCUTANEOUS
Status: DISCONTINUED | OUTPATIENT
Start: 2022-11-21 | End: 2022-11-22 | Stop reason: HOSPADM

## 2022-11-21 RX ORDER — MORPHINE SULFATE 2 MG/ML
4 INJECTION, SOLUTION INTRAMUSCULAR; INTRAVENOUS ONCE
Status: COMPLETED | OUTPATIENT
Start: 2022-11-21 | End: 2022-11-21

## 2022-11-21 RX ORDER — ACETAMINOPHEN 325 MG/1
650 TABLET ORAL EVERY 4 HOURS PRN
Status: DISCONTINUED | OUTPATIENT
Start: 2022-11-21 | End: 2022-11-22 | Stop reason: HOSPADM

## 2022-11-21 RX ORDER — CALCIUM CARBONATE 200(500)MG
2 TABLET,CHEWABLE ORAL 2 TIMES DAILY PRN
Status: DISCONTINUED | OUTPATIENT
Start: 2022-11-21 | End: 2022-11-22 | Stop reason: HOSPADM

## 2022-11-21 RX ORDER — ONDANSETRON 4 MG/1
4 TABLET, FILM COATED ORAL EVERY 6 HOURS PRN
Status: DISCONTINUED | OUTPATIENT
Start: 2022-11-21 | End: 2022-11-22 | Stop reason: HOSPADM

## 2022-11-21 RX ADMIN — METOPROLOL SUCCINATE 25 MG: 25 TABLET, EXTENDED RELEASE ORAL at 12:19

## 2022-11-21 RX ADMIN — ONDANSETRON 4 MG: 2 INJECTION INTRAMUSCULAR; INTRAVENOUS at 04:09

## 2022-11-21 RX ADMIN — ONDANSETRON 4 MG: 2 INJECTION INTRAMUSCULAR; INTRAVENOUS at 05:09

## 2022-11-21 RX ADMIN — SODIUM CHLORIDE 100 ML/HR: 9 INJECTION, SOLUTION INTRAVENOUS at 07:03

## 2022-11-21 RX ADMIN — SODIUM CHLORIDE 500 ML: 9 INJECTION, SOLUTION INTRAVENOUS at 04:09

## 2022-11-21 RX ADMIN — Medication 10 ML: at 20:26

## 2022-11-21 RX ADMIN — ATORVASTATIN CALCIUM 80 MG: 80 TABLET, FILM COATED ORAL at 20:24

## 2022-11-21 RX ADMIN — MORPHINE SULFATE 4 MG: 2 INJECTION, SOLUTION INTRAMUSCULAR; INTRAVENOUS at 05:09

## 2022-11-21 RX ADMIN — FAMOTIDINE 20 MG: 10 INJECTION INTRAVENOUS at 20:25

## 2022-11-21 RX ADMIN — FAMOTIDINE 20 MG: 10 INJECTION INTRAVENOUS at 10:33

## 2022-11-21 RX ADMIN — SERTRALINE HYDROCHLORIDE 50 MG: 50 TABLET, FILM COATED ORAL at 12:19

## 2022-11-21 RX ADMIN — LATANOPROST 1 DROP: 50 SOLUTION OPHTHALMIC at 20:27

## 2022-11-21 RX ADMIN — METOPROLOL SUCCINATE 25 MG: 25 TABLET, EXTENDED RELEASE ORAL at 20:24

## 2022-11-21 RX ADMIN — Medication 10 ML: at 08:06

## 2022-11-21 RX ADMIN — CEFTRIAXONE SODIUM 1 G: 1 INJECTION, POWDER, FOR SOLUTION INTRAMUSCULAR; INTRAVENOUS at 12:19

## 2022-11-21 NOTE — ED PROVIDER NOTES
MD ATTESTATION NOTE    The VINNY and I have discussed this patient's history, physical exam, and treatment plan.  I have reviewed the documentation and personally had a face to face interaction with the patient. I affirm the documentation and agree with the treatment and plan.  The attached note describes my personal findings.      I provided a substantive portion of the care of the patient.  I personally performed the physical exam in its entirety, and below are my findings.  For this patient encounter, the patient wore surgical mask, I wore full protective PPE including N95 and eye protection.      Brief HPI: Patient presents for evaluation of right upper quadrant abdominal pain.  Patient has history of gallbladder issues.  Patient states she has had persistent symptoms and is now wanting to have surgery.    PHYSICAL EXAM  ED Triage Vitals [11/21/22 0024]   Temp Heart Rate Resp BP SpO2   -- 63 16 121/53 98 %      Temp src Heart Rate Source Patient Position BP Location FiO2 (%)   -- Monitor Sitting Right arm --         GENERAL: no acute distress  HENT: nares patent  EYES: no scleral icterus  CV: regular rhythm, normal rate  RESPIRATORY: normal effort  ABDOMEN: soft. Mild right upper tenderness  MUSCULOSKELETAL: no deformity  NEURO: alert, moves all extremities, follows commands  PSYCH:  calm, cooperative  SKIN: warm, dry    Vital signs and nursing notes reviewed.        Plan: Lab evaluation and ultrasound, admission       Sharif Haynes MD  11/21/22 8014

## 2022-11-21 NOTE — CONSULTS
[facility] INITIAL INPATIENT CONSULTATION NOTE    REASON FOR CONSULTATION:    Myelofibrosis, on Jakafi    HISTORY OF PRESENT ILLNESS:  Catherine Boyer is a 82 y.o. 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    The patient presented with several days of worsening right upper quadrant abdominal pain with nausea and vomiting. Right now her pain is better after pain medication.    Ultrasound shows porcelain gallbladder containing small stones and sludge.    Dr. Dani Grover Jr has seen her with plans for cholecystectomy assuming medical clearance. She's been cleared from a cardiology standpoint, with recommendations on Eliquis and Plavix management outlined in Dr. Hernández' note.    Past Medical History:   Diagnosis Date   • Atrial fibrillation (HCC)    • Breast cancer (HCC)    • CAD (coronary artery disease)     NSTEMI 2/2022: 90% ostial LAD, 99% D1, 70% mid-distal LAD (medical therapy). She received two stents (2.5x18 and 2.5x26mm [place] LEFTY) but I don't know which one went to which lesion.   • Carotid atherosclerosis    • Chronic diastolic (congestive) heart failure (HCC)    • GERD (gastroesophageal reflux disease)    • Glaucoma    • History of cataract    • Hypertension    • Microcytic anemia     per Dr. oleg pate office  note 6/30/22-dd   • Myeloproliferative disorder (HCC)     JAK2 positive   • Nonbacterial thrombotic endocarditis     6/2021: 4x5mm vegetation on the ventricular surface of the anterior MV, negative blood cultures   • Porcelain gallbladder    • PUD (peptic ulcer disease)    • TIA (transient ischemic attack)    • Type 2 diabetes mellitus (HCC)    • Upper GI bleed        Past Surgical History:   Procedure Laterality Date   • BREAST LUMPECTOMY  1999   • CARDIAC CATHETERIZATION N/A 09/02/2022    Procedure: Coronary angiography;   Surgeon: Guero Verde MD;  Location: [facility] CATH INVASIVE LOCATION;  Service: Cardiovascular;  Laterality: N/A;   • CARDIAC CATHETERIZATION N/A 09/02/2022    Procedure: Stent LEFTY coronary;  Surgeon: Guero Verde MD;  Location: [facility] INVASIVE LOCATION;  Service: Cardiovascular;  Laterality: N/A;   • CATARACT EXTRACTION  2011   • JOINT REPLACEMENT         SOCIAL HISTORY:   reports that she has quit smoking. Her smoking use included cigarettes. She has a 20.00 pack-year smoking history. She has never used smokeless tobacco. She reports current alcohol use. She reports that she does not use drugs.    FAMILY HISTORY:  family history includes Lung cancer in her father and sister; Ovarian cancer in her mother.    ALLERGIES:  Allergies   Allergen Reactions   • Aspirin Unknown - Low Severity   • Oxycodone Unknown - Low Severity   • Diphenhydramine Hcl Anxiety and Unknown (See Comments)     Benadryl IVP       MEDICATIONS:  As listed in the electronic medical record.    Review of Systems   Constitutional: Positive for fatigue.   Gastrointestinal: Positive for abdominal pain, nausea and vomiting.   All other systems reviewed and are negative.      Vitals:    11/21/22 0745 11/21/22 0918 11/21/22 1100 11/21/22 1623   BP: 105/61 126/60 111/58 120/67   BP Location:  Left arm Right arm Right arm   Patient Position:  Lying Lying Lying   Pulse: 65 75 74 78   Resp:  16 18 16   Temp:  98.4 °F (36.9 °C) 98.8 °F (37.1 °C)    TempSrc:  Oral Oral    SpO2: 95% 93% 96% 99%   Weight:  70 kg (154 lb 5.2 oz)     Height:           Physical Exam  Vitals reviewed.   Constitutional:       Appearance: She is well-developed.   HENT:      Head: Normocephalic and atraumatic.      Nose: Nose normal.   Eyes:      Conjunctiva/sclera: Conjunctivae normal.      Pupils: Pupils are equal, round, and reactive to light.   Cardiovascular:      Rate and Rhythm: Normal rate and regular rhythm.      Heart sounds: Normal heart sounds, S1 normal and  S2 normal. No murmur heard.    No friction rub. No gallop.   Pulmonary:      Effort: Pulmonary effort is normal. No respiratory distress.      Breath sounds: Normal breath sounds. No stridor. No wheezing, rhonchi or rales.   Chest:      Chest wall: No tenderness.   Abdominal:      General: Bowel sounds are normal. There is no distension.      Palpations: Abdomen is soft. There is no mass.      Tenderness: There is no abdominal tenderness. There is no guarding or rebound.      Comments: There is no right upper quadrant tenderness on my examination at this time.   Musculoskeletal:         General: Normal range of motion.      Cervical back: Neck supple.   Lymphadenopathy:      Cervical: No cervical adenopathy.      Upper Body:      Right upper body: No supraclavicular adenopathy.      Left upper body: No supraclavicular adenopathy.   Skin:     General: Skin is warm and dry.      Findings: No erythema or rash.   Neurological:      Mental Status: She is alert and oriented to person, place, and time.      Cranial Nerves: No cranial nerve deficit.      Sensory: No sensory deficit.   Psychiatric:         Behavior: Behavior normal.         Thought Content: Thought content normal.         Judgment: Judgment normal.         DIAGNOSTIC DATA:  Results from last 7 days   Lab Units 11/21/22  0250   WBC 10*3/mm3 36.30*   HEMOGLOBIN g/dL 10.5*   HEMATOCRIT % 30.6*   PLATELETS 10*3/mm3 220     Lab Results   Component Value Date    NEUTROABS 32.96 (H) 11/21/2022     Results from last 7 days   Lab Units 11/21/22  0250   SODIUM mmol/L 136   POTASSIUM mmol/L 3.7   CHLORIDE mmol/L 96*   CO2 mmol/L 26.5   BUN mg/dL 21   CREATININE mg/dL 1.10*   GLUCOSE mg/dL 159*   CALCIUM mg/dL 9.1               IMAGING:  US Gallbladder (11/21/2022 03:24)  US gallbladder images personally reviewed. Porcelain gallbladder with sludge.      ASSESSMENT:  This is a 82 y.o. female with:    *JAK2 positive myeloproliferative disorder, perhaps primary  myelofibrosis  • She has been seen by Dr. Gerard Foreman with [facility] specialists and cancer and blood disorders in [place].      • She was seen there initially on 6/4/2021 with leukocytosis with a white blood cell count of 104,000.  Hemoglobin was normal at 11.6 and platelets were normal at 299,000.  PCR for BCR/ABL and FISH for BCR/ABL were negative.  A bone marrow aspiration was attempted on 6/9/2021 which was unsuccessful.  She then went to [place] for the summer.  She was admitted to the [facility] between 6/29/2021 and 7/9/2021.  She had a bone marrow aspiration and biopsy performed there on 6/30/2021 showing a hypercellular marrow at greater than 95% with 1% blasts.  Focal 1+ reticulin fibrosis was noted.  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  PCR for BCR/ABL was negative.  JAK2 V617F PCR was positive.  Cytogenetics were normal.  Next generation myeloid disorder profiling of the bone marrow aspirate from 6/30/2021 showed TET2 C7434E and B2096Hpf*47 abnormalities and JAK2 V617F.  Therefore she was suspected to have early primary myelofibrosis and she was started on hydroxyurea 1000 mg daily.  Subsequently, hydroxyurea was held.  Blood counts had improved.  She was admitted to the hospital from 2/9 through 2/15/2022 for symptomatic anemia and chest pain.  Her hemoglobin was 5.5.  The white blood cell count was 7.6.  Platelets were 56,000.  Fecal occult blood testing was negative.  She received 3 units of packed red blood cells.  No endoscopy was performed.  A left heart catheterization was performed with a drug-eluting stent placed to the second diagonal on 2/11/2022 and she was discharged from that hospitalization on aspirin 81 mg, Plavix 75 mg, and Eliquis 5 mg twice daily.  Blood counts improved by 3/7/2022 and her white blood cell count was 7.2 with a hemoglobin 11.4 and platelets 295,000.  She did have a bone marrow aspiration and biopsy on 3/7/2022 showing chronic  myeloproliferative neoplasm with potentially post ET fibrosis or consideration of primary myelofibrosis.  There was no evidence for myelodysplasia.  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  She also has atrial fibrillation and is anticoagulated with Eliquis.    • Hydroxyurea was discontinued and she was started on ruxolitinib 15 mg twice daily at her visit on 3/14/2022 with Dr. Gerard Foreman.  • She had CT imaging of the chest abdomen pelvis on 3/19/2022 that did not demonstrate any splenomegaly.  Calcified mediastinal lymphadenopathy was noted.  Borderline pretracheal and right hilar lymphadenopathy noted.  Small bilateral pleural effusions with bibasilar atelectasis noted.  • She has moved from [place] to [place] and is seen initially in the office on 5/15/2022.  She tolerates ruxolitinib well.  White blood cell count has decreased from 5.9 from 19.6.  Platelets have normalized at 170,000, down from 464,000.  The hemoglobin has also decreased at 8.6 with an MCV of 98.9.  • 5/26/2022: White blood cell count mildly elevated at 12.4 with a normal platelet count at 216,000.  She tolerates Jakafi very well.   • 6/30/2022: White blood cell count a little higher.  Continue monitoring.  • 9/13/2022: White blood cell count higher at 32,000  • Flow cytometry 9/13/2022 with 0.1% myeloblasts with a typical (normal) phenotype.  Nonspecific monocyte population.  • Bone marrow aspiration and biopsy on 10/7/2022 with hypercellular marrow at 95% with involvement by chronic myeloproliferative neoplasm, less than 5% blasts.  Mild reticulin fibrosis.  Decreased iron stores.  Consideration of CMML.  Flow cytometry showed a dim CD56 positive aberrant monocyte population at 8.2 percent of events.  Cytogenetics pending.  NGS pending.  • 10/26/2022: White blood cell count improved at 21,000.  • Patient admitted 10/29/2022 with COVID-19 infection.   Jakafi held.  • Resumed Jakafi 15 mg twice daily on 11/1/2022 which she continues     *Microcytic anemia  • Hemoglobin on 5/5/2022 was declining at 8.6.  • Ferritin 242 with an iron of 105 and 33% iron saturation, ferritin vitamin B12 1107, folic acid 15.1, normal RBC folate, creatinine 0.86,   • 5/26/2022: Hemoglobin stable at 8.5.  The reticulocyte count is a little bit more elevated.  I am hopeful that the hemoglobin will improve.  If it does not we will need to consider Procrit.  • Hgb stable 10.5     *History of marantic endocarditis and atrial fibrillation  • Anticoagulated with Eliquis 5 mg twice daily     *Recent admission with heart failure, positive stress test and LEFTY to a large diagonal branch for in sent stenosis.     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  She does not wish to pursue further evaluation.  She did have some brief epigastric/right upper quadrant pain which has now resolved.     *Splenic lesion on CT  • CT abdomen and pelvis 10/29/2022 with 2 cm low-attenuation lesion in the spleen, indeterminant  • Consider outpatient MRI for further evaluation     *Recent COVID-19 infection     *Chronic cholecystitis  · US with porceline gallbladder  · Evaluated by Dr. Dani Grover Jr who is hoping for elective cholecystectomy    RECOMMENDATIONS/PLAN:   1. OK for cholecystectomy from a hematology standpoint. I told her I think it's reasonable to pursue this, and she appreciated me using the word 'reasonable'. Perhaps her leukocytosis is in part due to the cholecystitis.   2. I will clarify with our office pharmacy staff instructions on holding Jakafi perioperatively. I suspect it will be held approximately two days prior to and two days following surgery. Final recommendations will be communicated to her by our office  pharmacy staff.  For now, continue Jakafi  3. She currently has follow up scheduled with me on 11/28, which may be around the date of her surgery. If so, we will reschedule.    Darrell Reinoso MD

## 2022-11-21 NOTE — ED NOTES
Pt arrived from EMS from [facility] reporting abdominal pain. Pt was recently seen here and was diagnosed with gallbladder issue. Pt ate this evening and pain worsened with N/V. Pt reported that she was told if the pain did not get better to come back.

## 2022-11-21 NOTE — CASE MANAGEMENT/SOCIAL WORK
Discharge Planning Assessment  [facility]     Patient Name: Catherine Boyer  MRN: [id number]  Today's Date: 11/21/2022    Admit Date: 11/21/2022    Plan: Plans to return to [facility] at d/c and daughter Ila will transport-SUNI Melton MSN, RN   Discharge Needs Assessment     Row Name 11/21/22 1041       Living Environment    People in Home alone    Current Living Arrangements assisted living facility  [facility]    Primary Care Provided by self    Provides Primary Care For no one    Family Caregiver if Needed child(margarette), adult    Family Caregiver Names [place]    Quality of Family Relationships supportive    Able to Return to Prior Arrangements yes       Resource/Environmental Concerns    Resource/Environmental Concerns none       Transition Planning    Patient/Family Anticipates Transition to home    Patient/Family Anticipated Services at Transition none    Transportation Anticipated family or friend will provide       Discharge Needs Assessment    Equipment Currently Used at Home oxygen;rollator;cane, straight;shower chair;grab bar  O2 at 2L at night, suppplied by [employer]    Concerns to be Addressed no discharge needs identified    Anticipated Changes Related to Illness none    Equipment Needed After Discharge none    Provided Post Acute Provider List? N/A    Provided Post Acute Provider Quality & Resource List? N/A               Discharge Plan     Row Name 11/21/22 1043       Plan    Plan Plans to return to [facility] at d/c and daughter Ila will transport-SUNI Melton MSN, RN    Provided Post Acute Provider List? N/A    Provided Post Acute Provider Quality & Resource List? N/A    Plan Comments Introduced self and explained role of CCP. Info on facesheet verified. PPE used. Lives at [facility] and plans to return at d/c-tavo Thorpe will transport. Uses rollator, BGM, shower chair, grab bars, rollator and straight cane. Denies any d/c  needs or financial concerns,-SUNI Melton MSN, RN              Continued Care and Services - Admitted Since 11/21/2022    Coordination has not been started for this encounter.     Selected Continued Care - Episodes Includes continued care and service providers with selected services from the active episodes listed below    Oncology Episode start date: 5/18/2022   There are no active outsourced providers for this episode.             Selected Continued Care - Prior Encounters Includes continued care and service providers with selected services from prior encounters from 8/23/2022 to 11/21/2022    Discharged on 11/1/2022 Admission date: 10/29/2022 - Discharge disposition: Home-Health Care [facility]    Destination     Service Provider Selected Services Address Phone Fax Patient Preferred    [facility] [place] [address] [facility] [address] [phone] --          Durable Medical Equipment     Service Provider Selected Services Address Phone Fax Patient Preferred    [facility] [address], [place] [id number] [phone] --          Home Medical Care     Service Provider Selected Services Address Phone Fax Patient Preferred    [facility] [address], [address], [place] [address] [phone] --                Discharged on 9/4/2022 Admission date: 8/31/2022 - Discharge disposition: Home or Self Care    Destination     Service Provider Selected Services Address Phone Fax Patient Preferred    [facility] [address] [facility] [address] [phone] --          Durable Medical Equipment     Service Provider Selected Services Address Phone Fax Patient Preferred    [facility] [address], [place] [id number] [phone] --                        Demographic Summary     Row Name 11/21/22 1040       General Information    Admission Type observation    Arrived From emergency department    Required Notices Provided Observation Status Notice    Referral Source admission list    Reason for Consult discharge planning    Preferred Language English               Functional Status     Row Name 11/21/22 1040       Functional Status    Usual Activity Tolerance moderate    Current Activity Tolerance moderate       Functional Status, IADL    Medications independent       Mental Status Summary    Recent Changes in Mental Status/Cognitive Functioning no changes               Psychosocial     Row Name 11/21/22 1041       Behavior WDL    Behavior WDL WDL       Emotion Mood WDL    Emotion/Mood/Affect WDL WDL       Speech WDL    Speech WDL WDL       Perceptual State WDL    Perceptual State WDL WDL       Thought Process WDL    Thought Process WDL WDL       Intellectual Performance WDL    Intellectual Performance WDL WDL               Abuse/Neglect    No documentation.                Legal    No documentation.                Substance Abuse    No documentation.                Patient Forms    No documentation.                   Daina Melton RN

## 2022-11-21 NOTE — CONSULTS
[facility]   Consult Note    Patient Name: Catherine Boyer  : 1940  MRN: [id number]  Primary Care Physician:  Kristi Moreau APRN  Referring Physician: Karan Hou MD  Date of admission: 2022    Inpatient General Surgery Consult  Consult performed by: Dani Grover Jr., MD  Consult ordered by: Melanie Botello APRN        Subjective   Subjective     Reason for Consult/ Chief Complaint: Abdominal pain    History of Present Illness  Catherine Boyer is a pleasant 82 y.o. female who presented to the emergency room with recurrent episodes of right upper quadrant abdominal pain.  She has a known history of porcelain gallbladder and had intermittent symptoms of right upper quadrant abdominal pain that is postprandial in nature.  Over the past several days though symptoms have worsened and now occur every time she eats.  She developed symptoms of pain that then resolve over time.  She has associated nausea and some vomiting.  She has not had any change in her bowel function.  A right upper quadrant ultrasound was performed that shows a porcelain gallbladder with small gallstones and sludge but no evidence of cholecystitis.    She has multiple medical problems including paroxysmal atrial fibrillation, chronic diastolic CHF, coronary artery disease with a previous NSTEMI.  She had a coronary stent placed in the diagonal in 2022.  She developed in-stent stenosis and had to have a stent placement in the diagonal in 2022.  She has been on Eliquis and Plavix.  Her last dose of both Eliquis and Plavix was yesterday morning.    Review of Systems   Constitutional: Negative for fatigue and fever.   Respiratory: Negative for chest tightness and shortness of breath.    Cardiovascular: Negative for chest pain and palpitations.   Gastrointestinal: Positive for abdominal pain, nausea and vomiting. Negative for blood in stool, constipation and diarrhea.        Personal History     Past Medical  History:   Diagnosis Date   • Atrial fibrillation (HCC)    • Breast cancer (HCC)    • CAD (coronary artery disease)     NSTEMI 2/2022: 90% ostial LAD, 99% D1, 70% mid-distal LAD (medical therapy). She received two stents (2.5x18 and 2.5x26mm [place] LEFTY) but I don't know which one went to which lesion.   • Carotid atherosclerosis    • Chronic diastolic (congestive) heart failure (HCC)    • GERD (gastroesophageal reflux disease)    • Glaucoma    • History of cataract    • Hypertension    • Microcytic anemia     per Dr. oleg pate office  note 6/30/22-dd   • Myeloproliferative disorder (HCC)     JAK2 positive   • Nonbacterial thrombotic endocarditis     6/2021: 4x5mm vegetation on the ventricular surface of the anterior MV, negative blood cultures   • Porcelain gallbladder    • PUD (peptic ulcer disease)    • TIA (transient ischemic attack)    • Type 2 diabetes mellitus (HCC)    • Upper GI bleed        Past Surgical History:   Procedure Laterality Date   • BREAST LUMPECTOMY  1999   • CARDIAC CATHETERIZATION N/A 09/02/2022    Procedure: Coronary angiography;  Surgeon: Guero Verde MD;  Location: [facility] CATH INVASIVE LOCATION;  Service: Cardiovascular;  Laterality: N/A;   • CARDIAC CATHETERIZATION N/A 09/02/2022    Procedure: Stent LEFTY coronary;  Surgeon: Guero Verde MD;  Location:  [facility] CATH INVASIVE LOCATION;  Service: Cardiovascular;  Laterality: N/A;   • CATARACT EXTRACTION  2011   • JOINT REPLACEMENT         Family History: family history includes Lung cancer in her father and sister; Ovarian cancer in her mother. Otherwise pertinent FHx was reviewed and not pertinent to current issue.    Social History:  reports that she has quit smoking. Her smoking use included cigarettes. She has a 20.00 pack-year smoking history. She has never used smokeless tobacco. She reports current alcohol use. She reports that she does not use drugs.    Home Medications:   Diclofenac Sodium, acetaminophen, apixaban,  atorvastatin, bimatoprost, clopidogrel, cyclobenzaprine, empagliflozin, furosemide, latanoprost, metoprolol succinate XL, pantoprazole, ruxolitinib, sertraline, and zolpidem    Allergies:  Allergies   Allergen Reactions   • Aspirin Unknown - Low Severity   • Oxycodone Unknown - Low Severity   • Diphenhydramine Hcl Anxiety and Unknown (See Comments)     Benadryl IVP       Objective    Objective     Vitals:  Temp:  [98.1 °F (36.7 °C)-98.8 °F (37.1 °C)] 98.8 °F (37.1 °C)  Heart Rate:  [63-78] 74  Resp:  [16-18] 18  BP: (104-131)/(48-69) 111/58  Flow (L/min):  [2] 2    Physical Exam  Constitutional:       Appearance: Normal appearance. She is well-developed. She is not toxic-appearing.   Eyes:      General: No scleral icterus.  Pulmonary:      Effort: Pulmonary effort is normal. No respiratory distress.   Abdominal:      Palpations: Abdomen is soft.      Tenderness: There is no abdominal tenderness.   Skin:     General: Skin is warm and dry.   Neurological:      Mental Status: She is alert and oriented to person, place, and time.   Psychiatric:         Behavior: Behavior normal.         Thought Content: Thought content normal.         Judgment: Judgment normal.         Result Review    Result Review:  I have personally reviewed the results from the time of this admission to 11/21/2022 15:01 EST and agree with these findings:  [x]  Laboratory list / accordion  []  Microbiology  [x]  Radiology  []  EKG/Telemetry   []  Cardiology/Vascular   []  Pathology  [x]  Old records  []  Other:      Assessment & Plan   Assessment / Plan     Brief Patient Summary with assessment and plan:  Catherine Boyer is a 82 y.o. female who has multiple medical problems including heart disease and is currently on Plavix and Eliquis.  She has known chronic cholecystitis based on a history of a porcelain gallbladder and presented to the emergency room with frequent symptoms of postprandial right upper quadrant pain with nausea and vomiting.    1.   Chronic cholecystitis: The patient has known chronic cholecystitis that has become increasingly symptomatic.  She would benefit from a cholecystectomy but is at increased risk due to her underlying heart disease.  She will also need to be off Eliquis for 2 days and Plavix for 5 days prior to proceeding with surgery.  A cardiology consultation has been ordered and I will wait for their recommendation.  Ideally, she can be discharged and then return for elective cholecystectomy next week.        Dani Grover Jr., MD

## 2022-11-21 NOTE — H&P
Patient Name:  Catherine Boyer  YOB: 1940  MRN:  [id number]  Admit Date:  11/21/2022  Patient Care Team:  Kristi Moreau APRN as PCP - General (Nurse Practitioner)  Gerard Foreman MD as Referring Physician (Medical Oncology)  Darrell Reinoso MD as Consulting Physician (Hematology and Oncology)  Albin Barriga MD as Consulting Physician (Cardiology)      Subjective   History Present Illness     Chief Complaint   Patient presents with   • Abdominal Pain     History of Present Illness   Ms. Boyer is a 82 y.o. former smoker with a history of DM2, PAF, myeloproliferative disorder, HTN, GERD, chronic diastolic CHF and CAD that presents to [facility] complaining of abdominal pain. The patient was just at this facility 10/29/22 to 11/1/22 with cough and confirmed COVID19. She was given antiviral therapy as well as dexamethasone given her history of myeloproliferative disorder followed by hematology. Her Jakafi was held and resumed at discharge. During that stay, she was found to have an indeterminate 2 cm lesion in the spleen as well as porcelain gallbladder with plans for outpatient MRI. She was discharged back to her half-way at that time.   The patient returned to the ED last night d/t an acute onset of epigastric pain/RUQ that has been intermittent, but progressive over the last week. She apparently started having some complaints last week but was able to tolerate the pain until last night. The pain is sharp and severe and worse with after eating. She states the pain has been associated with nausea and vomiting (more dry heaves), but denies any diarrhea. She denies any fever, chills, chest pain, dyspnea or cough, but has had some dysuria for the last few days. Her in-home healthcare provider had recently taken an urine, but she has yet to hear the results of this.    In the ED, she was hemodynamically stable and afebrile. WBC were 36 (chronically elevated), hemoglobin 10.5, lactic 2.1 and  lipase 20. Creatinine was 1.10 with stable electrolytes. UA showed 31-50 WBC and 4+ bacteria. She was given fluids and pain medication and has been admitted for further care. Her US gallbladder showed small stones and sludge as well as porcelain gallbladder.     Review of Systems   Constitutional: Negative for chills, fatigue and fever.   HENT: Negative for congestion and ear pain.    Respiratory: Negative for cough and shortness of breath.    Cardiovascular: Negative for chest pain and leg swelling.   Gastrointestinal: Positive for abdominal pain, nausea and vomiting.   Genitourinary: Negative for difficulty urinating and dysuria.   Musculoskeletal: Negative for arthralgias and back pain.   Skin: Negative for color change and pallor.   Neurological: Negative for dizziness, weakness and numbness.   Psychiatric/Behavioral: Negative for confusion. The patient is not nervous/anxious.       Personal History     Past Medical History:   Diagnosis Date   • Atrial fibrillation (HCC)    • Breast cancer (HCC)    • CAD (coronary artery disease)     NSTEMI 2/2022: 90% ostial LAD, 99% D1, 70% mid-distal LAD (medical therapy). She received two stents (2.5x18 and 2.5x26mm Moore LEFTY) but I don't know which one went to which lesion.   • Carotid atherosclerosis    • Chronic diastolic (congestive) heart failure (HCC)    • GERD (gastroesophageal reflux disease)    • Glaucoma    • History of cataract    • Hypertension    • Microcytic anemia     per Dr. oleg pate office  note 6/30/22-dd   • Myeloproliferative disorder (HCC)     JAK2 positive   • Nonbacterial thrombotic endocarditis     6/2021: 4x5mm vegetation on the ventricular surface of the anterior MV, negative blood cultures   • Porcelain gallbladder    • PUD (peptic ulcer disease)    • TIA (transient ischemic attack)    • Type 2 diabetes mellitus (HCC)    • Upper GI bleed      Past Surgical History:   Procedure Laterality Date   • BREAST LUMPECTOMY  1999   • CARDIAC  "CATHETERIZATION N/A 09/02/2022    Procedure: Coronary angiography;  Surgeon: Guero Verde MD;  Location:  [facility];  Service: Cardiovascular;  Laterality: N/A;   • CARDIAC CATHETERIZATION N/A 09/02/2022    Procedure: Stent LEFTY coronary;  Surgeon: Guero Verde MD;  Location:  [facility];  Service: Cardiovascular;  Laterality: N/A;   • CATARACT EXTRACTION  2011   • JOINT REPLACEMENT       Family History   Problem Relation Age of Onset   • Ovarian cancer Mother    • Lung cancer Father    • Lung cancer Sister      Social History     Tobacco Use   • Smoking status: Former     Packs/day: 1.00     Years: 20.00     Pack years: 20.00     Types: Cigarettes   • Smokeless tobacco: Never   • Tobacco comments:     30  years ago   Vaping Use   • Vaping Use: Never used   Substance Use Topics   • Alcohol use: Yes     Comment: \"rare\"   • Drug use: Never     Medications Prior to Admission   Medication Sig Dispense Refill Last Dose   • acetaminophen (TYLENOL) 500 MG tablet Take 500 mg by mouth As Needed.   11/20/2022   • apixaban (Eliquis) 5 MG tablet tablet Take 1 tablet by mouth Every 12 (Twelve) Hours. 180 tablet 3 11/20/2022   • atorvastatin (LIPITOR) 80 MG tablet Take 1 tablet by mouth Every Night.   11/20/2022   • bimatoprost (LUMIGAN) 0.01 % ophthalmic drops Apply 1 drop to eye(s) as directed by provider.   11/20/2022   • clopidogrel (PLAVIX) 75 MG tablet Take 1 tablet by mouth Daily.   11/20/2022   • cyclobenzaprine (FLEXERIL) 5 MG tablet Take 5 mg by mouth Every 8 (Eight) Hours As Needed.   11/20/2022   • Diclofenac Sodium (VOLTAREN) 1 % gel gel    11/20/2022   • empagliflozin (JARDIANCE) 10 MG tablet tablet Take 1 tablet by mouth Daily. 90 tablet 3 11/20/2022   • furosemide (LASIX) 40 MG tablet Take 1 tablet by mouth Daily. 90 tablet 3 11/20/2022   • Jakafi 15 MG chemo tablet Take 1 tablet by mouth 2 (Two) Times a Day. 60 tablet 3 11/20/2022   • latanoprost (XALATAN) 0.005 % " ophthalmic solution    11/20/2022   • metoprolol succinate XL (TOPROL-XL) 25 MG 24 hr tablet 25 mg 2 (Two) Times a Day.   11/20/2022   • pantoprazole (PROTONIX) 40 MG EC tablet Take 40 mg by mouth Daily.   11/20/2022   • sertraline (ZOLOFT) 50 MG tablet Take 50 mg by mouth Daily.   11/20/2022   • zolpidem (Ambien) 5 MG tablet Take 1 tablet by mouth At Night As Needed for Sleep (Bring to the sleep lab, do not take at home.). 1 tablet 0 11/20/2022     Allergies:    Allergies   Allergen Reactions   • Aspirin Unknown - Low Severity   • Oxycodone Unknown - Low Severity   • Diphenhydramine Hcl Anxiety and Unknown (See Comments)     Benadryl IVP       Objective    Objective     Vital Signs  Temp:  [98.1 °F (36.7 °C)-98.4 °F (36.9 °C)] 98.4 °F (36.9 °C)  Heart Rate:  [63-78] 75  Resp:  [16] 16  BP: (104-131)/(48-69) 126/60  SpO2:  [88 %-99 %] 93 %  on  Flow (L/min):  [2] 2;   Device (Oxygen Therapy): nasal cannula  Body mass index is 29.16 kg/m².    Physical Exam  Vitals and nursing note reviewed.   Constitutional:       Appearance: She is not toxic-appearing.   HENT:      Head: Normocephalic and atraumatic.      Right Ear: External ear normal.      Left Ear: External ear normal.      Nose: Nose normal.   Cardiovascular:      Rate and Rhythm: Normal rate and regular rhythm.      Pulses: Normal pulses.   Pulmonary:      Effort: Pulmonary effort is normal. No respiratory distress.      Breath sounds: Normal breath sounds.   Abdominal:      General: Bowel sounds are normal. There is no distension.      Palpations: Abdomen is soft.      Tenderness: There is abdominal tenderness.   Musculoskeletal:         General: No swelling or tenderness. Normal range of motion.      Cervical back: Normal range of motion and neck supple.   Skin:     General: Skin is warm and dry.      Findings: No bruising.   Neurological:      Mental Status: She is alert and oriented to person, place, and time.      Sensory: No sensory deficit.       Coordination: Coordination normal.   Psychiatric:         Mood and Affect: Mood normal.         Behavior: Behavior normal.        Results Review:  I reviewed the patient's new clinical results.  I reviewed the patient's new imaging results and agree with the interpretation.  I reviewed the patient's other test results and agree with the interpretation  I personally viewed and interpreted the patient's EKG/Telemetry data    Lab Results (last 24 hours)     Procedure Component Value Units Date/Time    CBC & Differential [[id number]]  (Abnormal) Collected: 11/21/22 0250    Specimen: Blood from Arm, Left Updated: 11/21/22 0313    Narrative:      The following orders were created for panel order CBC & Differential.  Procedure                               Abnormality         Status                     ---------                               -----------         ------                     CBC Auto Differential[[id number]]        Abnormal            Final result                 Please view results for these tests on the individual orders.    Comprehensive Metabolic Panel [[id number]]  (Abnormal) Collected: 11/21/22 0250    Specimen: Blood from Arm, Left Updated: 11/21/22 0330     Glucose 159 mg/dL      BUN 21 mg/dL      Creatinine 1.10 mg/dL      Sodium 136 mmol/L      Potassium 3.7 mmol/L      Chloride 96 mmol/L      CO2 26.5 mmol/L      Calcium 9.1 mg/dL      Total Protein 7.3 g/dL      Albumin 4.00 g/dL      ALT (SGPT) 13 U/L      AST (SGOT) 13 U/L      Alkaline Phosphatase 165 U/L      Total Bilirubin 0.5 mg/dL      Globulin 3.3 gm/dL      A/G Ratio 1.2 g/dL      BUN/Creatinine Ratio 19.1     Anion Gap 13.5 mmol/L      eGFR 49.7 mL/min/1.73      Comment: National Kidney Foundation and American Society of Nephrology (ASN) Task Force recommended calculation based on the Chronic Kidney Disease Epidemiology Collaboration (CKD-EPI) equation refit without adjustment for race.       Narrative:      GFR Normal >60  Chronic Kidney  Disease <60  Kidney Failure <15    The GFR formula is only valid for adults with stable renal function between ages 18 and 70.    Lipase [[id number]]  (Normal) Collected: 11/21/22 0250    Specimen: Blood from Arm, Left Updated: 11/21/22 0330     Lipase 20 U/L     Lactic Acid, Plasma [[id number]]  (Abnormal) Collected: 11/21/22 0250    Specimen: Blood from Arm, Left Updated: 11/21/22 0332     Lactate 2.1 mmol/L     CBC Auto Differential [[id number]]  (Abnormal) Collected: 11/21/22 0250    Specimen: Blood from Arm, Left Updated: 11/21/22 0313     WBC 36.30 10*3/mm3      RBC 3.21 10*6/mm3      Hemoglobin 10.5 g/dL      Hematocrit 30.6 %      MCV 95.3 fL      MCH 32.7 pg      MCHC 34.3 g/dL      RDW 15.9 %      RDW-SD 56.0 fl      MPV 11.5 fL      Platelets 220 10*3/mm3     Troponin [[id number]]  (Normal) Collected: 11/21/22 0250    Specimen: Blood from Arm, Left Updated: 11/21/22 0330     Troponin T 0.012 ng/mL     Narrative:      Troponin T Reference Range:  <= 0.03 ng/mL-   Negative for AMI  >0.03 ng/mL-     Abnormal for myocardial necrosis.  Clinicians would have to utilize clinical acumen, EKG, Troponin and serial changes to determine if it is an Acute Myocardial Infarction or myocardial injury due to an underlying chronic condition.       Results may be falsely decreased if patient taking Biotin.      Manual Differential [[id number]]  (Abnormal) Collected: 11/21/22 0250    Specimen: Blood from Arm, Left Updated: 11/21/22 0335     Neutrophil % 90.8 %      Lymphocyte % 3.1 %      Monocyte % 5.1 %      Basophil % 1.0 %      Neutrophils Absolute 32.96 10*3/mm3      Lymphocytes Absolute 1.13 10*3/mm3      Monocytes Absolute 1.85 10*3/mm3      Basophils Absolute 0.36 10*3/mm3      RBC Morphology Normal     WBC Morphology Normal     Platelet Morphology Normal    STAT Lactic Acid, Reflex [[id number]]  (Normal) Collected: 11/21/22 0631    Specimen: Blood Updated: 11/21/22 0701     Lactate 1.4 mmol/L     Urinalysis With  Microscopic If Indicated (No Culture) - Urine, Clean Catch [[id number]]  (Abnormal) Collected: 11/21/22 0632    Specimen: Urine, Clean Catch Updated: 11/21/22 0733     Color, UA Yellow     Appearance, UA Cloudy     pH, UA <=5.0     Specific Gravity, UA >=1.030     Glucose, UA >=1000 mg/dL (3+)     Ketones, UA Trace     Bilirubin, UA Negative     Blood, UA Negative     Protein, UA 30 mg/dL (1+)     Leuk Esterase, UA Small (1+)     Nitrite, UA Positive     Urobilinogen, UA 1.0 E.U./dL    Urinalysis, Microscopic Only - Urine, Clean Catch [[id number]]  (Abnormal) Collected: 11/21/22 0632    Specimen: Urine, Clean Catch Updated: 11/21/22 0752     RBC, UA None Seen /HPF      WBC, UA 31-50 /HPF      Bacteria, UA 4+ /HPF      Squamous Epithelial Cells, UA 0-2 /HPF      Hyaline Casts, UA None Seen /LPF      Methodology Manual Light Microscopy    POC Glucose Once [[id number]]  (Abnormal) Collected: 11/21/22 0701    Specimen: Blood Updated: 11/21/22 0703     Glucose 141 mg/dL      Comment: Meter: [id number] : [id number] More Rae RN       POC Glucose Once [[id number]]  (Normal) Collected: 11/21/22 1116    Specimen: Blood Updated: 11/21/22 1118     Glucose 102 mg/dL      Comment: Meter: [id number] : [id number] Chun BENTLEY             Imaging Results (Last 24 Hours)     Procedure Component Value Units Date/Time    US Gallbladder [[id number]] Collected: 11/21/22 0507     Updated: 11/21/22 0507    Narrative:        Patient: MARIBELL RUGGIERO  Time Out: 05:06  Exam(s): US GALLBLADDER     EXAM:    US Abdomen Limited, Gallbladder    CLINICAL HISTORY:      Further eval right upper quadrant pain.    TECHNIQUE:    Real-time ultrasound of the right upper quadrant with image   documentation. 63 images    COMPARISON:    CT abdomen and pelvis from 10 29 22    FINDINGS:    Gallbladder:  Small stones and sludge in the gallbladder.  Gallbladder   wall measures about 2 mm with calcifications.  No sonographic Ruggiero's   sign.   However patient is medicated before the exam.    Common bile duct:  Common bile duct measures about 5 mm in maximum   diameter.  No stones.  No dilation.    Pancreas:  Unremarkable as visualized.    Right kidney:  Right kidney measures 9.6 x 3.6 x 4.4 cm.    IMPRESSION:       1.  Small stones and sludge in the gallbladder.  2.  Porcelain gallbladder warrants outpatient surgical consultation.      Impression:          Electronically signed by Stan Barclay M.D. on 11-21-22 at 0506          Results for orders placed during the hospital encounter of 08/31/22    Echocardiogram 2D complete    Interpretation Summary  · Left ventricular ejection fraction appears to be 61 - 65%. Left ventricular systolic function is normal.[place] is hypokinetic.  · Left ventricular diastolic function is consistent with (grade II w/high LAP) pseudonormalization.  · There is echogenic mobile mass on the ventricular surface of a calcified and thickened non-coronary cusp of the aortic valve. An echo from 09/30/2021 reported similar finding.DDX old vegetation, mass of calcium extending from the noncoronary cusp of the aortic valve.  · The left atrial cavity is moderate to severely dilated.      ECG 12 Lead Other; Epigastric pain   Preliminary Result   HEART RATE= 90  bpm   RR Interval= 667  ms   NE Interval= 146  ms   P Horizontal Axis= -5  deg   P Front Axis= 61  deg   QRSD Interval= 98  ms   QT Interval= 389  ms   QRS Axis= 24  deg   T Wave Axis= 156  deg   - ABNORMAL ECG -   Sinus rhythm   Ventricular premature complex   Repol abnrm suggests ischemia, lateral leads   Electronically Signed By:    Date and Time of Study: 2022-11-21 04:31:01           Assessment/Plan     Active Hospital Problems    Diagnosis  POA   • **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]  Yes   • Acute UTI (urinary tract infection) [N39.0]  Yes   • Chronic diastolic (congestive) heart failure (HCC) [I50.32]  Yes   • Personal history of transient ischemic attack (TIA), and cerebral  infarction without residual deficits [Z86.73]  Not Applicable   • Hypertension [I10]  Yes   • GERD (gastroesophageal reflux disease) [K21.9]  Yes   • Type 2 diabetes mellitus (HCC) [E11.9]  Yes   • Paroxysmal atrial fibrillation (HCC) [I48.0]  Yes   • Myeloproliferative disorder (HCC) [D47.1]  Yes     JAK2 positive     • CAD (coronary artery disease) [I25.10]  Yes     NSTEMI 2/2022:90% ostial LAD, 99% D1, 70% mid-distal LAD (medical therapy)       Ms. Boyer is a 82 year old female who presented to the hospital with complaints of abdominal pain, nausea and vomiting. She was found to have a porcelain gallbladder last admission with ongoing colicky abdominal pain. She preferred outpatient work up, but had worsening symptoms prompting admission.    · Porcelain gallbladder: With ongoing and worsening symptoms to suggest need for cholecystectomy. Clinically, does not appear to have acute cholecystitis. Will await surgical evaluation. NPO, fluids and pain/nausea medication for now.  · Acute UTI: + UA with symptoms. Will cover with Rocephin and add culture.  · CAD/CHF/PAF: Followed by [facility]. S/P stent 9/2/22. On Eliquis/Plavix. Resume BB and hold blood thinners temporarily until Cardiology/surgery can evaluate. Resume statin (LFTs are normal). Denies any cardiac complaints and appears euvolemic.  Hold Lasix for now given poor oral intake.   · DM2: SSI and monitor ACHS. Hold oral agents.  · Myeloproliferative disorder: On Jakafi. Will hold for now with acute UTI and GI symptoms. WBC are chronically elevated, but higher than prior. Ask hematology to see.   · GERD: IV Pepcid ordered here.    · I discussed the patients findings and my recommendations with patient, family, nursing staff and Dr. Doan.    VTE Prophylaxis - SCDs. Use SCDs until Eliquis can be resumed  Code Status - No CPR or intubation. Discussed with patient and daughter at bedside.       ABEL Albarado  [facility]  11/21/22  12:12 EST

## 2022-11-21 NOTE — PLAN OF CARE
Problem: Adult Inpatient Plan of Care  Goal: Plan of Care Review  Outcome: Ongoing, Progressing  Flowsheets (Taken 11/21/2022 1715)  Outcome Evaluation: patient to follow up with general surgeon upon discharge for surgery scheduling, patient has consults to cardiology and hem-onc as well. patient waiting on admission bed.  Goal: Patient-Specific Goal (Individualized)  Outcome: Ongoing, Progressing  Goal: Absence of Hospital-Acquired Illness or Injury  Outcome: Ongoing, Progressing  Intervention: Identify and Manage Fall Risk  Recent Flowsheet Documentation  Taken 11/21/2022 1623 by Najma Lieberman RN  Safety Promotion/Fall Prevention:   activity supervised   safety round/check completed   room organization consistent   fall prevention program maintained  Taken 11/21/2022 1400 by Najma Lieberman RN  Safety Promotion/Fall Prevention:   assistive device/personal items within reach   safety round/check completed   nonskid shoes/slippers when out of bed   fall prevention program maintained  Taken 11/21/2022 1230 by Najma Lieberman RN  Safety Promotion/Fall Prevention:   room organization consistent   safety round/check completed   fall prevention program maintained   clutter free environment maintained  Taken 11/21/2022 1000 by Najma Lieberman RN  Safety Promotion/Fall Prevention:   activity supervised   fall prevention program maintained   nonskid shoes/slippers when out of bed   room organization consistent   safety round/check completed  Taken 11/21/2022 0918 by Najma Lieberman RN  Safety Promotion/Fall Prevention:   activity supervised   assistive device/personal items within reach   safety round/check completed   room organization consistent   lighting adjusted   clutter free environment maintained   fall prevention program maintained  Intervention: Prevent Skin Injury  Recent Flowsheet Documentation  Taken 11/21/2022 1623 by Najma Lieberman, RN  Body Position:   side-lying   right   head facing, right    position changed independently  Taken 11/21/2022 1400 by Najma Lieberman RN  Body Position:   position changed independently   dangle, side of bed  Taken 11/21/2022 1230 by Najma Lieberman RN  Body Position:   turned   head facing, right   position changed independently   side-lying  Taken 11/21/2022 1000 by Najma Lieberman RN  Body Position:   position changed independently   upper extremity elevated   turned  Taken 11/21/2022 0918 by Najma Lieberman RN  Body Position:   right   turned   side-lying  Skin Protection:   incontinence pads utilized   pulse oximeter probe site changed   tubing/devices free from skin contact  Intervention: Prevent and Manage VTE (Venous Thromboembolism) Risk  Recent Flowsheet Documentation  Taken 11/21/2022 1623 by Najma Lieberman RN  Activity Management: bedrest  Taken 11/21/2022 1500 by Najma Lieberman RN  VTE Prevention/Management: sequential compression devices on  Range of Motion: active ROM (range of motion) encouraged  Taken 11/21/2022 1230 by Najma Lieberman RN  Activity Management:   activity adjusted per tolerance   bedrest  Taken 11/21/2022 0918 by Najma Lieberman RN  Activity Management: bedrest  VTE Prevention/Management: sequential compression devices on  Range of Motion: active ROM (range of motion) encouraged  Intervention: Prevent Infection  Recent Flowsheet Documentation  Taken 11/21/2022 1623 by Najma Lieberman RN  Infection Prevention:   rest/sleep promoted   hand hygiene promoted  Taken 11/21/2022 1400 by Najma Lieberman RN  Infection Prevention: hand hygiene promoted  Taken 11/21/2022 1230 by Najma Lieberman RN  Infection Prevention: rest/sleep promoted  Taken 11/21/2022 1000 by Najma Lieberman RN  Infection Prevention: hand hygiene promoted  Goal: Optimal Comfort and Wellbeing  Outcome: Ongoing, Progressing  Intervention: Monitor Pain and Promote Comfort  Recent Flowsheet Documentation  Taken 11/21/2022 1230 by Najma Lieberman RN  Pain Management  Interventions: care clustered  Taken 11/21/2022 0918 by Najma Lieberman RN  Pain Management Interventions:   care clustered   pain management plan reviewed with patient/caregiver   position adjusted   relaxation techniques promoted  Intervention: Provide Person-Centered Care  Recent Flowsheet Documentation  Taken 11/21/2022 1230 by Najma Lieberman RN  Trust Relationship/Rapport:   care explained   questions answered  Taken 11/21/2022 0918 by Najma Lieberman RN  Trust Relationship/Rapport:   care explained   questions encouraged   reassurance provided   thoughts/feelings acknowledged   questions answered  Goal: Readiness for Transition of Care  Outcome: Ongoing, Progressing  Intervention: Mutually Develop Transition Plan  Recent Flowsheet Documentation  Taken 11/21/2022 1004 by Najma Lieberman RN  Transportation Anticipated: family or friend will provide  Patient/Family Anticipated Services at Transition:   Patient/Family Anticipates Transition to: (back to [facility]) other (see comments)  Taken 11/21/2022 0958 by Najma Lieberman RN  Equipment Currently Used at Home: (patient has not used walker yet)   cane, straight   walker, rolling     Problem: Pain Acute  Goal: Acceptable Pain Control and Functional Ability  Outcome: Ongoing, Progressing  Intervention: Prevent or Manage Pain  Recent Flowsheet Documentation  Taken 11/21/2022 1623 by Najma Lieberman RN  Medication Review/Management: medications reviewed  Taken 11/21/2022 1500 by Najma Lieberman RN  Sensory Stimulation Regulation: television on  Taken 11/21/2022 1400 by Najma Lieberman RN  Medication Review/Management: medications reviewed  Taken 11/21/2022 1230 by Najma Lieberman, RN  Sleep/Rest Enhancement:   awakenings minimized   regular sleep/rest pattern promoted   room darkened  Medication Review/Management: medications reviewed  Taken 11/21/2022 1000 by Najma Lieberman RN  Medication Review/Management: medications reviewed  Taken  11/21/2022 0918 by Najma Lieberman RN  Sleep/Rest Enhancement:   family presence promoted   relaxation techniques promoted  Medication Review/Management: medications reviewed  Intervention: Develop Pain Management Plan  Recent Flowsheet Documentation  Taken 11/21/2022 1230 by Najma Lieberman RN  Pain Management Interventions: care clustered  Taken 11/21/2022 0918 by Najma Lieberman RN  Pain Management Interventions:   care clustered   pain management plan reviewed with patient/caregiver   position adjusted   relaxation techniques promoted  Intervention: Optimize Psychosocial Wellbeing  Recent Flowsheet Documentation  Taken 11/21/2022 0918 by Najma Lieberman RN  Supportive Measures:   positive reinforcement provided   verbalization of feelings encouraged     Problem: Fall Injury Risk  Goal: Absence of Fall and Fall-Related Injury  Outcome: Ongoing, Progressing  Intervention: Identify and Manage Contributors  Recent Flowsheet Documentation  Taken 11/21/2022 1623 by Najma Lieberman RN  Medication Review/Management: medications reviewed  Taken 11/21/2022 1400 by Najma Lieberman RN  Medication Review/Management: medications reviewed  Taken 11/21/2022 1230 by Najma Lieberman RN  Medication Review/Management: medications reviewed  Taken 11/21/2022 1000 by Najma Lieberman RN  Medication Review/Management: medications reviewed  Taken 11/21/2022 0918 by Najma Lieberman RN  Medication Review/Management: medications reviewed  Self-Care Promotion: independence encouraged  Intervention: Promote Injury-Free Environment  Recent Flowsheet Documentation  Taken 11/21/2022 1623 by Najma Lieberman RN  Safety Promotion/Fall Prevention:   activity supervised   safety round/check completed   room organization consistent   fall prevention program maintained  Taken 11/21/2022 1400 by Najma Lieberman RN  Safety Promotion/Fall Prevention:   assistive device/personal items within reach   safety round/check completed   nonskid  shoes/slippers when out of bed   fall prevention program maintained  Taken 11/21/2022 1230 by Najma Lieberman, RN  Safety Promotion/Fall Prevention:   room organization consistent   safety round/check completed   fall prevention program maintained   clutter free environment maintained  Taken 11/21/2022 1000 by Najma Lieberman, RN  Safety Promotion/Fall Prevention:   activity supervised   fall prevention program maintained   nonskid shoes/slippers when out of bed   room organization consistent   safety round/check completed  Taken 11/21/2022 0918 by Najma Lieberman RN  Safety Promotion/Fall Prevention:   activity supervised   assistive device/personal items within reach   safety round/check completed   room organization consistent   lighting adjusted   clutter free environment maintained   fall prevention program maintained   Goal Outcome Evaluation:              Outcome Evaluation: patient to follow up with general surgeon upon discharge for surgery scheduling, patient has consults to cardiology and hem-onc as well. patient waiting on admission bed.

## 2022-11-21 NOTE — ED NOTES
Patient O2 sat dropped to 80's after pain medication administration. Patient woken up, instructed to deep breathe. Patient placed on 2L NC to maintain O2 sats >90% while sleeping.

## 2022-11-21 NOTE — CONSULTS
Patient Name: Catherine Boyer  :1940  82 y.o.    Date of Admission: 2022  Date of Consultation:  22  Encounter Provider: Aram Hernández MD  Place of Service: [facility] CARDIOLOGY  Referring Provider: Karan Hou MD  Patient Care Team:  Kristi Moreau APRN as PCP - General (Nurse Practitioner)  Gerard Foreman MD as Referring Physician (Medical Oncology)  Darrell Reinoso MD as Consulting Physician (Hematology and Oncology)  Albin Barriga MD as Consulting Physician (Cardiology)      Chief complaint: ABD pain     History of Present Illness: Catherine Boyer is a 82 year old pt of Dr. Barriga with a history of CAD, PAF, DM II, GERD, HTN, TIA,myeloproliferative disorder, right sided breast cancer s/p lumpectomy/radiation and chronic diastolic CHF.      In 2021, while in [place] she was diagnosed with atrial fibrillation, nonbacterial endocarditis, JAK2+ myeloproliferative disorder    In 2022, she presented with heartburn symptoms and chest pain.  She was markedly anemic with a hemoglobin of 5.5 and required PRBC infusion.  She was diagnosed with a non-STEMI.  She underwent stent placement to the diagonal.  She had a long lesion in the mid distal LAD that was treated medically.  EF was 45-50%.  She was previously followed at [facility] in [place] and recently moved to [place].     In 2022, pt presented with chest pain and shortness of breath. She was treated for acute on chronic diastolic heart failure with hypoxia.  Stress test was positive. She then went on to  Have a LEFTY to large diagonal branch for in-stent stenosis.  She has  of mid RCA and nonobstructive disease of the LAD.  There was no stent in LAD.  Her shortness of breath improved with IV diuresis and she was discharged on 2 L of oxygen nasal cannula.  her lisinopril was discontinued and she was started on  empagliflozin and discharged on her home dose of furosemide 40 mg daily.    Pt  was last seen in the office on 9/12/22 for follow up. Pt was doing better but was still fatigued. She denied any chest pain, shortness of breath, palpitations, edema, dizziness or syncope. She was told to check her weight at home and if she had gained 3-5 pounds to take an extra dose of furosemide.     Pt was admitted to the hospital on 10/9/22 with elevated lactic acid, porcelain gall bladder, splenic lesion and abd pain. Pt was also found to have an indeterminate 2 cm lesion in the spleen.  An MRI was considered but not performed.  Patient declined further work-up for the porcelain gallbladder.  Her symptoms improved and she was discharged home on 11/1/22.     Pt presented to ER on 11/21/22 with complaints of abdominal pain. Pt reported intermittent right upper quadrant pain since at least 5/2022. Since the weekend the episodes of right upper quadrant pain have been increasing in frequency. Pt reported every time she eats a meal, she is having sharp 8/10 pain that lasts several hours prior to subsiding and is associated with nausea and vomiting and radiates to her back.  In ER, BUN/CR 21/1.10, lipase 20, Lactate 2.1, WBC 36.30, HGB 10.5, troponin 0.012, alkaline phosphatase 165, US of gallbladder showed  small stones and sludge as well as porcelain gallbladder.  UA positive for nitrates and bacteria.      I have been asked to see for preop clearance.  She denies any recurrent chest pain or any of the angina symptoms letter to receive the stent the first place.      CATH 9/2/22    Conclusions:  1. Severe multivessel coronary artery disease with 99% in-stent restenosis of diagonal stent, 60% proximal to mid LAD segment stenoses, 100% occlusion of mid right coronary artery with left-to-right collaterals filling the distal vascular bed, and luminal regularities throughout circumflex territory.  2. Successful revascularization of in-stent restenosis with placement of a 2.25 x 15 mm Xience drug-eluting stent deployed at  20 joan.  No residual stenoses and restoration of TREY-3 flow distally.     Recommendations:   1. Patient was loaded with clopidogrel in the Cath Lab will defer aspirin use given documented allergy and concomitant Eliquis use.  2. Patient may restart Eliquis tomorrow  3. Would continue medical management of LAD disease.  Lesions are somewhat borderline and the distal LAD is actually very small caliber size and distribution.      Stress Test 9/2/22  • Myocardial perfusion imaging indicates a large-sized infarct located in the anterior wall and apex with mild-to-moderate mirtha-infarct ischemia.  • An additional medium-sized, mild-to-moderately severe area of ischemia located in the inferior wall was noted.  • Left ventricular ejection fraction is severely reduced. (Calculated EF = 27%).  • Impressions are consistent with a high risk study.  • Spoke with Ludivina ZENG to relay information to physician.          ECHO 9/1/22    • Left ventricular ejection fraction appears to be 61 - 65%. Left ventricular systolic function is normal.[place] is hypokinetic.  • Left ventricular diastolic function is consistent with (grade II w/high LAP) pseudonormalization.  • There is echogenic mobile mass on the ventricular surface of a calcified and thickened non-coronary cusp of the aortic valve. An echo from 09/30/2021 reported similar finding.DDX old vegetation, mass of calcium extending from the noncoronary cusp of the aortic valve.  • The left atrial cavity is moderate to severely dilated.    Past Medical History:   Diagnosis Date   • Atrial fibrillation (HCC)    • Breast cancer (HCC)    • CAD (coronary artery disease)     NSTEMI 2/2022: 90% ostial LAD, 99% D1, 70% mid-distal LAD (medical therapy). She received two stents (2.5x18 and 2.5x26mm [place] LEFTY) but I don't know which one went to which lesion.   • Carotid atherosclerosis    • Chronic diastolic (congestive) heart failure (HCC)    • GERD (gastroesophageal reflux disease)    • Glaucoma     • History of cataract    • Hypertension    • Microcytic anemia     per Dr. oleg pate office  note 6/30/22-dd   • Myeloproliferative disorder (HCC)     JAK2 positive   • Nonbacterial thrombotic endocarditis     6/2021: 4x5mm vegetation on the ventricular surface of the anterior MV, negative blood cultures   • Porcelain gallbladder    • PUD (peptic ulcer disease)    • TIA (transient ischemic attack)    • Type 2 diabetes mellitus (HCC)    • Upper GI bleed        Past Surgical History:   Procedure Laterality Date   • BREAST LUMPECTOMY  1999   • CARDIAC CATHETERIZATION N/A 09/02/2022    Procedure: Coronary angiography;  Surgeon: Guero Verde MD;  Location:  [facility];  Service: Cardiovascular;  Laterality: N/A;   • CARDIAC CATHETERIZATION N/A 09/02/2022    Procedure: Stent LEFTY coronary;  Surgeon: Guero Verde MD;  Location:  [facility];  Service: Cardiovascular;  Laterality: N/A;   • CATARACT EXTRACTION  2011   • JOINT REPLACEMENT           Prior to Admission medications    Medication Sig Start Date End Date Taking? Authorizing Provider   acetaminophen (TYLENOL) 500 MG tablet Take 500 mg by mouth As Needed.   Yes Marya Olivas MD   apixaban (Eliquis) 5 MG tablet tablet Take 1 tablet by mouth Every 12 (Twelve) Hours. 11/18/22  Yes Estrellita Blanco APRN   atorvastatin (LIPITOR) 80 MG tablet Take 1 tablet by mouth Every Night. 3/1/22  Yes ProviderMarya MD   bimatoprost (LUMIGAN) 0.01 % ophthalmic drops Apply 1 drop to eye(s) as directed by provider.   Yes ProviderMarya MD   clopidogrel (PLAVIX) 75 MG tablet Take 1 tablet by mouth Daily. 3/1/22  Yes Marya Olivas MD   cyclobenzaprine (FLEXERIL) 5 MG tablet Take 5 mg by mouth Every 8 (Eight) Hours As Needed. 10/5/22  Yes Marya Olivas MD   Diclofenac Sodium (VOLTAREN) 1 % gel gel  10/5/22  Yes Marya Olivas MD   empagliflozin (JARDIANCE) 10 MG tablet tablet Take 1 tablet by  "mouth Daily. 9/12/22  Yes Estrellita Blanco APRN   furosemide (LASIX) 40 MG tablet Take 1 tablet by mouth Daily. 7/20/22  Yes Albin Barriga MD   Jakafi 15 MG chemo tablet Take 1 tablet by mouth 2 (Two) Times a Day. 9/28/22  Yes Darrell Reinoso MD   latanoprost (XALATAN) 0.005 % ophthalmic solution  3/14/22  Yes ProviderMarya MD   metoprolol succinate XL (TOPROL-XL) 25 MG 24 hr tablet 25 mg 2 (Two) Times a Day. 3/9/22  Yes Marya Olivas MD   pantoprazole (PROTONIX) 40 MG EC tablet Take 40 mg by mouth Daily. 4/14/22  Yes Marya Olivas MD   sertraline (ZOLOFT) 50 MG tablet Take 50 mg by mouth Daily. 4/5/22  Yes Marya Olivas MD   zolpidem (Ambien) 5 MG tablet Take 1 tablet by mouth At Night As Needed for Sleep (Bring to the sleep lab, do not take at home.). 11/18/22  Yes Danisha Griffin APRN   Ear Drops 6.5 % otic solution INSTILL 10 DROPS IN EACH EAR DAILY FOR 14 DAYS. START LEFT EAR AFTER COMPLETION OF OFLOXACIN 10/20/22 11/21/22  ProviderMarya MD   ofloxacin (FLOXIN) 0.3 % otic solution  10/21/22 11/21/22  ProviderMarya MD       Allergies   Allergen Reactions   • Aspirin Unknown - Low Severity   • Oxycodone Unknown - Low Severity   • Diphenhydramine Hcl Anxiety and Unknown (See Comments)     Benadryl IVP       Social History     Socioeconomic History   • Marital status:    Tobacco Use   • Smoking status: Former     Packs/day: 1.00     Years: 20.00     Pack years: 20.00     Types: Cigarettes   • Smokeless tobacco: Never   • Tobacco comments:     30  years ago   Vaping Use   • Vaping Use: Never used   Substance and Sexual Activity   • Alcohol use: Yes     Comment: \"rare\"   • Drug use: Never   • Sexual activity: Defer       Family History   Problem Relation Age of Onset   • Ovarian cancer Mother    • Lung cancer Father    • Lung cancer Sister        REVIEW OF SYSTEMS:   All systems reviewed.  Pertinent positives identified in HPI.  All other systems are " negative.      Objective:     Vitals:    11/21/22 0745 11/21/22 0918 11/21/22 1100 11/21/22 1623   BP: 105/61 126/60 111/58 120/67   BP Location:  Left arm Right arm Right arm   Patient Position:  Lying Lying Lying   Pulse: 65 75 74 78   Resp:  16 18 16   Temp:  98.4 °F (36.9 °C) 98.8 °F (37.1 °C)    TempSrc:  Oral Oral    SpO2: 95% 93% 96% 99%   Weight:  70 kg (154 lb 5.2 oz)     Height:         Body mass index is 29.16 kg/m².    General Appearance:    Alert, cooperative, in no acute distress, appears stated age   Head:    Normocephalic, without obvious abnormality, atraumatic   Eyes:            Lids and lashes normal, conjunctivae and sclerae normal, no icterus, no pallor, corneas clear, PERRLA   Ears:    Ears appear intact with no abnormalities noted   Throat:   No oral lesions, no thrush, oral mucosa moist   Neck:   No adenopathy, supple, trachea midline, no thyromegaly, no carotid bruit, no JVD   Back:     No kyphosis present, no scoliosis present, no skin lesions, erythema or scars, no tenderness to percussion or palpation, range of motion normal   Lungs:     Clear to auscultation, respirations regular, even and unlabored    Heart:    Regular rhythm and normal rate, normal S1 and S2, 2 out of 6 systolic murmur at right upper sternal border, no gallop, no rub, no click   Chest Wall:    No abnormalities observed   Abdomen:     Normal bowel sounds, no masses, no organomegaly, soft, nontender, nondistended, no guarding, no rebound  tenderness   Extremities:   Moves all extremities well, no edema, no cyanosis, no redness   Pulses:   Pulses palpable and equal bilaterally. Normal radial, carotid, femoral, dorsalis pedis and posterior tibial pulses bilaterally. Normal abdominal aorta   Skin:  Psychiatric:   No bleeding, bruising or rash    Alert and oriented x 3, normal mood and affect   Lab Review:     Results from last 7 days   Lab Units 11/21/22  0250   SODIUM mmol/L 136   POTASSIUM mmol/L 3.7   CHLORIDE mmol/L 96*    CO2 mmol/L 26.5   BUN mg/dL 21   CREATININE mg/dL 1.10*   CALCIUM mg/dL 9.1   BILIRUBIN mg/dL 0.5   ALK PHOS U/L 165*   ALT (SGPT) U/L 13   AST (SGOT) U/L 13   GLUCOSE mg/dL 159*     Results from last 7 days   Lab Units 11/21/22  0250   TROPONIN T ng/mL 0.012     Results from last 7 days   Lab Units 11/21/22  0250   WBC 10*3/mm3 36.30*   HEMOGLOBIN g/dL 10.5*   HEMATOCRIT % 30.6*   PLATELETS 10*3/mm3 220                                           I personally viewed and interpreted the patient's EKG/Telemetry data.    EKG reviewed and stable from prior.  No acute ischemic changes.  TTE reviewed from August 31, 2022:  LVEF 60%  Hypokinetic apex  Grade 2 diastolic function  Calcified aortic valve with possible calcified mass on aortic valve.  - Moderate to severely dilated atrium    Assessment and Plan:       Patient Active Problem List   Diagnosis   • CAD (coronary artery disease)   • Nonbacterial thrombotic endocarditis   • Paroxysmal atrial fibrillation (HCC)   • Myeloproliferative disorder (HCC)   • Microcytic anemia   • Type 2 diabetes mellitus (HCC)   • GERD (gastroesophageal reflux disease)   • Hypertension   • Personal history of transient ischemic attack (TIA), 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 deficits   • COVID   • Porcelain gallbladder   • Breast cancer (HCC)   • Chronic diastolic (congestive) heart failure (HCC)   • Acute UTI (urinary tract infection)     1.  Preoperative risk ratification for upcoming gallbladder surgery  -She is had a recent PCI to her diagonal for a non-ACS presentation for angina.  At this point she is considered maximally revascularized for upcoming surgery.  - I discussed her PCI findings with interventional cardiologist Dr. Hassan  - She is almost 3 months out from a PCI for a non-ACS presentation, likely safe to stop her Plavix.  - She can stop her Plavix, would continue her Eliquis until 2 days prior to surgery as requested per surgery.    -I would continue her Eliquis  and Plavix for now, pending exact surgical date planning.  -When surgical plans are decided, can stop Plavix 5 days prior.  When Plavix is stopped, would start aspirin 81 daily  - Would continue aspirin and Eliquis, until Eliquis needs to be stopped 2 days prior to surgery.  - Would continue aspirin 81 mg perioperatively.  - Would resume the Eliquis when safe postoperatively from a surgical perspective.  Would resume the Plavix when safe.  When the Plavix is resumed aspirin can then be stopped.  -TTE reviewed from prior to her PCI.  Normal LVEF.  -Given her recent revascularization, no further cardiac testing is required prior to surgery.  She does except that given her age and frailty, she likely has had a higher risk than traditional risk factors can estimate, however she states that she accepts this risk given the poor quality of life she has with the gallbladder.    Thank you for the consult, would make above changes to anticoagulation regimen when surgical planning and date has been determined.  No further cardiac testing required, would make above antiplatelet changes.    Please reach out to cardiology with any questions or concerns, cardiology will sign off at this time.  Thank you for the consult      Aram Hernández MD  11/21/22  17:18 EST

## 2022-11-21 NOTE — ED PROVIDER NOTES
EMERGENCY DEPARTMENT ENCOUNTER    Room Number:  39/39  Date of encounter:  11/21/2022  PCP: Kristi Moreau APRN  Historian: Patient      HPI:  Chief Complaint: Abdominal pain  A complete HPI/ROS/PMH/PSH/SH/FH are unobtainable due to: Nothing    Context: Catherine Boyer is a 82 y.o. female who presents [facility] ED from [facility] c/o abdominal pain.  Patient states she has had intermittent right upper quadrant pain since at least May 2022.  Over the course of the weekend her bouts of right upper quadrant pain are increasing in frequency.  She states every time she attempts to eat a meal she is having sharp 8 out of 10 pain that last for several hours prior to subsiding.  She states is associated with nausea vomiting.  It radiates to the back.  She was recently told she had a bad gallbladder.    Patient was admitted to the hospital on 10/9/2022 for further evaluation elevated lactic acid, porcelain gallbladder, splenic lesion, abdominal pain.  She remained in the hospital through 11/1/2022.  While in the hospital in addition to her porcelain gallbladder, she was noted to have an indeterminate 2 cm lesion in the spleen.  An MRI was considered but not performed.  Patient declined further work-up for the porcelain gallbladder.  Her symptoms improved and she was discharged home.  She was told to return to the emergency department should symptoms recur.    Patient also has myeloproliferative disorder is taking Jakafi.  She is followed by [facility].      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   • Paroxysmal atrial fibrillation (HCC) 07/14/2022   • Myeloproliferative disorder (HCC) 07/14/2022   • Microcytic anemia 07/14/2022   • Type 2 diabetes mellitus (HCC) 07/14/2022   • GERD (gastroesophageal reflux disease)    • Hypertension    • Personal history of transient ischemic attack (TIA),  "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 deficits 09/04/2022   • COVID 10/29/2022   • Porcelain gallbladder    • Breast cancer (HCC)    • Chronic diastolic (congestive) heart failure (HCC)      Resolved Ambulatory Problems     Diagnosis Date Noted   • Acute on chronic diastolic heart failure (HCC) 07/14/2022   • Chest pain with high risk for cardiac etiology 08/31/2022     Past Medical History:   Diagnosis Date   • Atrial fibrillation (HCC)    • Carotid atherosclerosis    • Glaucoma    • History of cataract    • PUD (peptic ulcer disease)    • TIA (transient ischemic attack)    • Upper GI bleed          PAST SURGICAL HISTORY  Past Surgical History:   Procedure Laterality Date   • BREAST LUMPECTOMY  1999   • CARDIAC CATHETERIZATION N/A 9/2/2022    Procedure: Coronary angiography;  Surgeon: Guero Verde MD;  Location:  [facility];  Service: Cardiovascular;  Laterality: N/A;   • CARDIAC CATHETERIZATION N/A 9/2/2022    Procedure: Stent LEFTY coronary;  Surgeon: Guero Verde MD;  Location:  [facility];  Service: Cardiovascular;  Laterality: N/A;   • CATARACT EXTRACTION  2011         FAMILY HISTORY  Family History   Problem Relation Age of Onset   • Ovarian cancer Mother    • Lung cancer Father    • Lung cancer Sister          SOCIAL HISTORY  Social History     Socioeconomic History   • Marital status:    Tobacco Use   • Smoking status: Former     Packs/day: 1.00     Years: 20.00     Pack years: 20.00     Types: Cigarettes   • Smokeless tobacco: Never   • Tobacco comments:     30  years ago   Vaping Use   • Vaping Use: Never used   Substance and Sexual Activity   • Alcohol use: Yes     Comment: \"rare\"   • Drug use: Never   • Sexual activity: Defer         ALLERGIES  Aspirin, Oxycodone, and Diphenhydramine hcl        REVIEW OF SYSTEMS  Review of Systems   Constitutional: Negative.    HENT: Negative.    Respiratory: Negative for cough and shortness of breath.  "   Cardiovascular: Negative for chest pain and palpitations.   Gastrointestinal: Positive for abdominal pain, nausea and vomiting.   Genitourinary: Negative.    Musculoskeletal: Negative.    Skin: Negative.    Neurological: Negative.    Psychiatric/Behavioral: Negative.         All systems reviewed and negative except for those discussed in HPI.       PHYSICAL EXAM    I have reviewed the triage vital signs and nursing notes.    ED Triage Vitals [11/21/22 0024]   Temp Heart Rate Resp BP SpO2   -- 63 16 121/53 98 %      Temp src Heart Rate Source Patient Position BP Location FiO2 (%)   -- Monitor Sitting Right arm --       Physical Exam  GENERAL: Frail in appearance, nontoxic  HENT: nares patent  EYES: no scleral icterus  CV: regular rhythm, regular rate  RESPIRATORY: normal effort  ABDOMEN: TTP right upper quadrant, no obvious guarding or rebound.    MUSCULOSKELETAL: no deformity  NEURO: alert, moves all extremities, follows commands  SKIN: warm, dry        LAB RESULTS  Recent Results (from the past 24 hour(s))   Comprehensive Metabolic Panel    Collection Time: 11/21/22  2:50 AM    Specimen: Arm, Left; Blood   Result Value Ref Range    Glucose 159 (H) 65 - 99 mg/dL    BUN 21 8 - 23 mg/dL    Creatinine 1.10 (H) 0.57 - 1.00 mg/dL    Sodium 136 136 - 145 mmol/L    Potassium 3.7 3.5 - 5.2 mmol/L    Chloride 96 (L) 98 - 107 mmol/L    CO2 26.5 22.0 - 29.0 mmol/L    Calcium 9.1 8.6 - 10.5 mg/dL    Total Protein 7.3 6.0 - 8.5 g/dL    Albumin 4.00 3.50 - 5.20 g/dL    ALT (SGPT) 13 1 - 33 U/L    AST (SGOT) 13 1 - 32 U/L    Alkaline Phosphatase 165 (H) 39 - 117 U/L    Total Bilirubin 0.5 0.0 - 1.2 mg/dL    Globulin 3.3 gm/dL    A/G Ratio 1.2 g/dL    BUN/Creatinine Ratio 19.1 7.0 - 25.0    Anion Gap 13.5 5.0 - 15.0 mmol/L    eGFR 49.7 (L) >60.0 mL/min/1.73   Lipase    Collection Time: 11/21/22  2:50 AM    Specimen: Arm, Left; Blood   Result Value Ref Range    Lipase 20 13 - 60 U/L   Lactic Acid, Plasma    Collection Time:  11/21/22  2:50 AM    Specimen: Arm, Left; Blood   Result Value Ref Range    Lactate 2.1 (C) 0.5 - 2.0 mmol/L   Green Top (Gel)    Collection Time: 11/21/22  2:50 AM   Result Value Ref Range    Extra Tube Hold for add-ons.    Lavender Top    Collection Time: 11/21/22  2:50 AM   Result Value Ref Range    Extra Tube hold for add-on    Gold Top - SST    Collection Time: 11/21/22  2:50 AM   Result Value Ref Range    Extra Tube Hold for add-ons.    Light Blue Top    Collection Time: 11/21/22  2:50 AM   Result Value Ref Range    Extra Tube Hold for add-ons.    CBC Auto Differential    Collection Time: 11/21/22  2:50 AM    Specimen: Arm, Left; Blood   Result Value Ref Range    WBC 36.30 (C) 3.40 - 10.80 10*3/mm3    RBC 3.21 (L) 3.77 - 5.28 10*6/mm3    Hemoglobin 10.5 (L) 12.0 - 15.9 g/dL    Hematocrit 30.6 (L) 34.0 - 46.6 %    MCV 95.3 79.0 - 97.0 fL    MCH 32.7 26.6 - 33.0 pg    MCHC 34.3 31.5 - 35.7 g/dL    RDW 15.9 (H) 12.3 - 15.4 %    RDW-SD 56.0 (H) 37.0 - 54.0 fl    MPV 11.5 6.0 - 12.0 fL    Platelets 220 140 - 450 10*3/mm3   Troponin    Collection Time: 11/21/22  2:50 AM    Specimen: Arm, Left; Blood   Result Value Ref Range    Troponin T 0.012 0.000 - 0.030 ng/mL   Manual Differential    Collection Time: 11/21/22  2:50 AM    Specimen: Arm, Left; Blood   Result Value Ref Range    Neutrophil % 90.8 (H) 42.7 - 76.0 %    Lymphocyte % 3.1 (L) 19.6 - 45.3 %    Monocyte % 5.1 5.0 - 12.0 %    Basophil % 1.0 0.0 - 1.5 %    Neutrophils Absolute 32.96 (H) 1.70 - 7.00 10*3/mm3    Lymphocytes Absolute 1.13 0.70 - 3.10 10*3/mm3    Monocytes Absolute 1.85 (H) 0.10 - 0.90 10*3/mm3    Basophils Absolute 0.36 (H) 0.00 - 0.20 10*3/mm3    RBC Morphology Normal Normal    WBC Morphology Normal Normal    Platelet Morphology Normal Normal   ECG 12 Lead Other; Epigastric pain    Collection Time: 11/21/22  4:31 AM   Result Value Ref Range    QT Interval 389 ms       Ordered the above labs and independently reviewed the  results.        RADIOLOGY  No Radiology Exams Resulted Within Past 24 Hours    I ordered the above noted radiological studies. Reviewed by me and discussed with radiologist.  See dictation for official radiology interpretation.      PROCEDURES    Procedures      MEDICATIONS GIVEN IN ER    Medications   sodium chloride 0.9 % flush 10 mL (has no administration in time range)   ondansetron (ZOFRAN) injection 4 mg (4 mg Intravenous Given 11/21/22 0409)   sodium chloride 0.9 % bolus 500 mL (0 mL Intravenous Stopped 11/21/22 0447)         PROGRESS, DATA ANALYSIS, CONSULTS, AND MEDICAL DECISION MAKING    All labs have been independently reviewed by me.  All radiology studies have been reviewed by me and discussed with radiologist dictating the report.   EKG's independently viewed and interpreted by me.  Discussion below represents my analysis of pertinent findings related to patient's condition, differential diagnosis, treatment plan and final disposition.    DDx: Porcelain gallbladder, biliary colic, acute cholecystitis, ACS, other intra-abdominal process.  Patient states this feels exactly like previous exacerbations of her gallbladder symptoms.  She states they have significantly become more frequent since she was last seen at this hospital.  We will go ahead and obtain CBC, CMP, lipase, UA, EKG, troponin, gallbladder ultrasound to further evaluate.    ED Course as of 11/21/22 0453   Mon Nov 21, 2022   0320 WBC(!!): 36.30  Patient has myeloproliferative disorder and her white blood cell count has been in this range for the past 2 weeks. [RC]   0321 Ultrasound shows a porcelain gallbladder with sludge.  There are no acute inflammatory changes consistent with acute cholecystitis.  However, patient's biliary colic symptoms are said to be coming significantly more frequent and the episodes longer in duration.  Plan to admit the patient to the hospital for right upper quadrant pain, porcelain gallbladder, biliary colic. [RC]    0349 Glucose(!): 159 [RC]   0349 BUN: 21 [RC]   0349 Creatinine(!): 1.10 [RC]   0349 Sodium: 136 [RC]   0349 Potassium: 3.7 [RC]   0349 ALT (SGPT): 13 [RC]   0349 AST (SGOT): 13 [RC]   0349 Alkaline Phosphatase(!): 165 [RC]   0349 Total Bilirubin: 0.5 [RC]   0349 Hemoglobin(!): 10.5 [RC]   0349 Hematocrit(!): 30.6 [RC]   0349 Platelets: 220 [RC]   0349 Lactate(!!): 2.1 [RC]   0349 Lipase: 20 [RC]   0349 Troponin T: 0.012  In line with previous 4 troponins over the course of the month [RC]   0350 Patient resting quietly in no acute distress.  Her pain is subsided at this time.  Will order IV Zofran to treat the patient's symptoms and admit to the hospitalist.  I discussed the patient's case with ABEL Ramírez with [facility].  To admit to Dr. Morales's care [RC]      ED Course User Index  [RC] Ascencion Whittington III, PA           PPE: The patient wore a surgical mask throughout the entire patient encounter. I wore an N95.    AS OF 04:53 EST VITALS:    BP - 121/53  HR - 63  TEMP - 98.1 °F (36.7 °C)  O2 SATS - 98%        DIAGNOSIS  Final diagnoses:   Porcelain gallbladder   Biliary colic         DISPOSITION  ADMISSION    Discussed treatment plan and reason for admission with pt/family and admitting physician.  Pt/family voiced understanding of the plan for admission for further testing/treatment as needed.              Ascencion Whittington III, PA  11/21/22 0452

## 2022-11-22 ENCOUNTER — TELEPHONE (OUTPATIENT)
Dept: ONCOLOGY | Facility: CLINIC | Age: 82
End: 2022-11-22

## 2022-11-22 ENCOUNTER — TELEPHONE (OUTPATIENT)
Dept: SURGERY | Facility: CLINIC | Age: 82
End: 2022-11-22

## 2022-11-22 ENCOUNTER — READMISSION MANAGEMENT (OUTPATIENT)
Dept: CALL CENTER | Facility: HOSPITAL | Age: 82
End: 2022-11-22

## 2022-11-22 VITALS
DIASTOLIC BLOOD PRESSURE: 62 MMHG | HEIGHT: 61 IN | BODY MASS INDEX: 29.14 KG/M2 | HEART RATE: 102 BPM | OXYGEN SATURATION: 90 % | TEMPERATURE: 99.6 F | WEIGHT: 154.32 LBS | SYSTOLIC BLOOD PRESSURE: 132 MMHG | RESPIRATION RATE: 18 BRPM

## 2022-11-22 DIAGNOSIS — K81.9 CHOLECYSTITIS: Primary | ICD-10-CM

## 2022-11-22 LAB
ANION GAP SERPL CALCULATED.3IONS-SCNC: 10.6 MMOL/L (ref 5–15)
BUN SERPL-MCNC: 12 MG/DL (ref 8–23)
BUN/CREAT SERPL: 13.2 (ref 7–25)
CALCIUM SPEC-SCNC: 7.9 MG/DL (ref 8.6–10.5)
CHLORIDE SERPL-SCNC: 102 MMOL/L (ref 98–107)
CO2 SERPL-SCNC: 24.4 MMOL/L (ref 22–29)
CREAT SERPL-MCNC: 0.91 MG/DL (ref 0.57–1)
DEPRECATED RDW RBC AUTO: 56.4 FL (ref 37–54)
EGFRCR SERPLBLD CKD-EPI 2021: 62.3 ML/MIN/1.73
ERYTHROCYTE [DISTWIDTH] IN BLOOD BY AUTOMATED COUNT: 15.7 % (ref 12.3–15.4)
GLUCOSE BLDC GLUCOMTR-MCNC: 86 MG/DL (ref 70–130)
GLUCOSE BLDC GLUCOMTR-MCNC: 90 MG/DL (ref 70–130)
GLUCOSE SERPL-MCNC: 97 MG/DL (ref 65–99)
HCT VFR BLD AUTO: 25.5 % (ref 34–46.6)
HGB BLD-MCNC: 8.4 G/DL (ref 12–15.9)
MCH RBC QN AUTO: 32.9 PG (ref 26.6–33)
MCHC RBC AUTO-ENTMCNC: 32.9 G/DL (ref 31.5–35.7)
MCV RBC AUTO: 100 FL (ref 79–97)
PLATELET # BLD AUTO: 151 10*3/MM3 (ref 140–450)
PMV BLD AUTO: 12 FL (ref 6–12)
POTASSIUM SERPL-SCNC: 3.8 MMOL/L (ref 3.5–5.2)
RBC # BLD AUTO: 2.55 10*6/MM3 (ref 3.77–5.28)
SODIUM SERPL-SCNC: 137 MMOL/L (ref 136–145)
WBC NRBC COR # BLD: 35.59 10*3/MM3 (ref 3.4–10.8)

## 2022-11-22 PROCEDURE — 80048 BASIC METABOLIC PNL TOTAL CA: CPT | Performed by: NURSE PRACTITIONER

## 2022-11-22 PROCEDURE — 82962 GLUCOSE BLOOD TEST: CPT

## 2022-11-22 PROCEDURE — 25010000002 CEFTRIAXONE PER 250 MG: Performed by: NURSE PRACTITIONER

## 2022-11-22 PROCEDURE — 96376 TX/PRO/DX INJ SAME DRUG ADON: CPT

## 2022-11-22 PROCEDURE — 36415 COLL VENOUS BLD VENIPUNCTURE: CPT | Performed by: NURSE PRACTITIONER

## 2022-11-22 PROCEDURE — G0378 HOSPITAL OBSERVATION PER HR: HCPCS

## 2022-11-22 PROCEDURE — 85027 COMPLETE CBC AUTOMATED: CPT | Performed by: NURSE PRACTITIONER

## 2022-11-22 RX ORDER — HYDROCODONE BITARTRATE AND ACETAMINOPHEN 5; 325 MG/1; MG/1
2 TABLET ORAL EVERY 4 HOURS PRN
Qty: 20 TABLET | Refills: 0 | Status: SHIPPED | OUTPATIENT
Start: 2022-11-22 | End: 2022-11-25

## 2022-11-22 RX ORDER — CIPROFLOXACIN 500 MG/1
500 TABLET, FILM COATED ORAL 2 TIMES DAILY
Qty: 10 TABLET | Refills: 0 | Status: SHIPPED | OUTPATIENT
Start: 2022-11-22 | End: 2022-12-01 | Stop reason: HOSPADM

## 2022-11-22 RX ORDER — ASPIRIN 81 MG/1
81 TABLET, CHEWABLE ORAL DAILY
Status: DISCONTINUED | OUTPATIENT
Start: 2022-11-22 | End: 2022-11-22 | Stop reason: HOSPADM

## 2022-11-22 RX ORDER — ONDANSETRON 4 MG/1
4 TABLET, FILM COATED ORAL EVERY 6 HOURS PRN
Qty: 30 TABLET | Refills: 0 | Status: SHIPPED | OUTPATIENT
Start: 2022-11-22 | End: 2023-02-24 | Stop reason: ALTCHOICE

## 2022-11-22 RX ORDER — CEFAZOLIN SODIUM 2 G/100ML
2 INJECTION, SOLUTION INTRAVENOUS ONCE
Status: CANCELLED | OUTPATIENT
Start: 2022-11-28 | End: 2022-11-22

## 2022-11-22 RX ORDER — ASPIRIN 81 MG/1
81 TABLET ORAL DAILY
Qty: 10 TABLET | Refills: 0 | Status: SHIPPED | OUTPATIENT
Start: 2022-11-22 | End: 2022-12-02

## 2022-11-22 RX ADMIN — ASPIRIN 81 MG: 81 TABLET, CHEWABLE ORAL at 12:05

## 2022-11-22 RX ADMIN — METOPROLOL SUCCINATE 25 MG: 25 TABLET, EXTENDED RELEASE ORAL at 08:48

## 2022-11-22 RX ADMIN — FAMOTIDINE 20 MG: 10 INJECTION INTRAVENOUS at 12:05

## 2022-11-22 RX ADMIN — Medication 10 ML: at 08:48

## 2022-11-22 RX ADMIN — SODIUM CHLORIDE 75 ML/HR: 9 INJECTION, SOLUTION INTRAVENOUS at 00:11

## 2022-11-22 RX ADMIN — MICONAZOLE NITRATE: 2 POWDER TOPICAL at 08:48

## 2022-11-22 RX ADMIN — APIXABAN 5 MG: 5 TABLET, FILM COATED ORAL at 12:05

## 2022-11-22 RX ADMIN — MICONAZOLE NITRATE: 2 POWDER TOPICAL at 01:52

## 2022-11-22 RX ADMIN — CEFTRIAXONE SODIUM 1 G: 1 INJECTION, POWDER, FOR SOLUTION INTRAMUSCULAR; INTRAVENOUS at 12:05

## 2022-11-22 RX ADMIN — SERTRALINE HYDROCHLORIDE 50 MG: 50 TABLET, FILM COATED ORAL at 08:48

## 2022-11-22 NOTE — CASE MANAGEMENT/SOCIAL WORK
"Continued Stay Note  [facility]     Patient Name: Catherine Boyer  MRN: [id number]  Today's Date: 11/22/2022    Admit Date: 11/21/2022    Plan: return to [facility]   Discharge Plan     Row Name 11/22/22 1221       Plan    Plan return to [facility]    Plan Comments Katina [phone] at [facility] notified of discharge, I ask if she needs any information sent with pt other than AVS, she said \"that's it\".   Laura Gary RN               Discharge Codes    No documentation.               Expected Discharge Date and Time     Expected Discharge Date Expected Discharge Time    Nov 22, 2022             Laura Gary, KARRI    "

## 2022-11-22 NOTE — TELEPHONE ENCOUNTER
Per Dr. Reinoso pt will not hold Jakfafi for surgical procedure. Ila Boyer pts daughter informed and V/U

## 2022-11-22 NOTE — DISCHARGE SUMMARY
Patient Name: Catherine Boyer  : 1940  MRN: [id number]    Date of Admission: 2022  Date of Discharge:  2022  Primary Care Physician: Kristi Moreau APRN      Chief Complaint:   Abdominal Pain      Discharge Diagnoses     Active Hospital Problems    Diagnosis  POA   • **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]  Yes   • Acute UTI (urinary tract infection) [N39.0]  Yes   • Chronic diastolic (congestive) heart failure (HCC) [I50.32]  Yes   • Personal history of transient ischemic attack (TIA), 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 deficits [Z86.73]  Not Applicable   • Hypertension [I10]  Yes   • GERD (gastroesophageal reflux disease) [K21.9]  Yes   • Type 2 diabetes mellitus (HCC) [E11.9]  Yes   • Paroxysmal atrial fibrillation (HCC) [I48.0]  Yes   • Myeloproliferative disorder (HCC) [D47.1]  Yes   • CAD (coronary artery disease) [I25.10]  Yes      Resolved Hospital Problems   No resolved problems to display.        Hospital Course     Ms. Boyer is a 82 y.o. female with a history of Myeloproliferative disorder, atrial fibrillation, heart failure coronary artery disease status post PCI 3months ago that came to the hospital for right upper quadrant abdominal pain that is exacerbated by eating.  Her Ultrasound of the gallbladder showed several small stones and sludge as well as a porcelain gallbladder. In addition, her urinalysis was concerning for UTI.  She has been  evaluated by general surgery recommends cholecystectomy.  However she needs to be off of Eliquis x2 days and Plavix x5 days.  Cardiology has evaluated her for clearance given her PCI 3 months ago.  Cardiology approved surgery but recommend starting aspirin 81 mg while she is off of her Plavix.  Eliquis and Plavix need to be resumed shortly after surgery when okay with Dr. Grover.  Otherwise she is stable from a cardiac standpoint with no further testing needed.  Hematology also saw the patient given myeloproliferative disorder and recommends  holding Jakafi 2 days prior to surgery and 2 days after.  Her UTI symptoms are minimal and awaiting urine culture.  We will discharge patient on ciprofloxacin.  Otherwise patient is relatively asymptomatic today and would like to be discharged home since she will have to wait till surgery until next week.  Confirm with general surgeon that she is cleared for discharge.       Day of Discharge     Subjective:  Doing okay with lighter p.o. intake.  No significant nausea vomiting diarrhea.  No chest pain shortness of breath.    Physical Exam:  Temp:  [98.1 °F (36.7 °C)-99.6 °F (37.6 °C)] 99.6 °F (37.6 °C)  Heart Rate:  [] 102  Resp:  [16-18] 18  BP: ()/(47-62) 132/62  Body mass index is 29.16 kg/m².  Physical Exam  See exam today  Consultants     Consult Orders (all) (From admission, onward)     Start     Ordered    11/21/22 1218  Hematology & Oncology Inpatient Consult  Once        Specialty:  Hematology and Oncology  Provider:  Darrell Reinoso MD    11/21/22 1218    11/21/22 1033  Inpatient Cardiology Consult  Once        Specialty:  Cardiology  Provider:  Melanie Sutton MD    11/21/22 1032    11/21/22 1006  Inpatient Case Management  Consult  Once        Provider:  (Not yet assigned)    11/21/22 1006    11/21/22 0633  Inpatient General Surgery Consult  Once        Specialty:  General Surgery  Provider:  Yary Head MD    11/21/22 0634              Procedures     * Surgery not found *      Imaging Results (All)     Procedure Component Value Units Date/Time    US Gallbladder [[id number]] Collected: 11/21/22 0507     Updated: 11/21/22 0507    Narrative:        Patient: MARIBELL RUGGIERO  Time Out: 05:06  Exam(s): US GALLBLADDER     EXAM:    US Abdomen Limited, Gallbladder    CLINICAL HISTORY:      Further eval right upper quadrant pain.    TECHNIQUE:    Real-time ultrasound of the right upper quadrant with image   documentation. 63 images    COMPARISON:    CT abdomen and pelvis from 10 29  22    FINDINGS:    Gallbladder:  Small stones and sludge in the gallbladder.  Gallbladder   wall measures about 2 mm with calcifications.  No sonographic Boyer's   sign.  However patient is medicated before the exam.    Common bile duct:  Common bile duct measures about 5 mm in maximum   diameter.  No stones.  No dilation.    Pancreas:  Unremarkable as visualized.    Right kidney:  Right kidney measures 9.6 x 3.6 x 4.4 cm.    IMPRESSION:       1.  Small stones and sludge in the gallbladder.  2.  Porcelain gallbladder warrants outpatient surgical consultation.      Impression:          Electronically signed by Stan Barclay M.D. on 11-21-22 at 0506          Results for orders placed during the hospital encounter of 08/31/22    Echocardiogram 2D complete    Interpretation Summary  · Left ventricular ejection fraction appears to be 61 - 65%. Left ventricular systolic function is normal.[place] is hypokinetic.  · Left ventricular diastolic function is consistent with (grade II w/high LAP) pseudonormalization.  · There is echogenic mobile mass on the ventricular surface of a calcified and thickened non-coronary cusp of the aortic valve. An echo from 09/30/2021 reported similar finding.DDX old vegetation, mass of calcium extending from the noncoronary cusp of the aortic valve.  · The left atrial cavity is moderate to severely dilated.    Pertinent Labs     Results from last 7 days   Lab Units 11/22/22  0956 11/21/22  0250   WBC 10*3/mm3 35.59* 36.30*   HEMOGLOBIN g/dL 8.4* 10.5*   PLATELETS 10*3/mm3 151 220     Results from last 7 days   Lab Units 11/22/22  0956 11/21/22  0250   SODIUM mmol/L 137 136   POTASSIUM mmol/L 3.8 3.7   CHLORIDE mmol/L 102 96*   CO2 mmol/L 24.4 26.5   BUN mg/dL 12 21   CREATININE mg/dL 0.91 1.10*   GLUCOSE mg/dL 97 159*   EGFR mL/min/1.73 62.3 49.7*     Results from last 7 days   Lab Units 11/21/22  0250   ALBUMIN g/dL 4.00   BILIRUBIN mg/dL 0.5   ALK PHOS U/L 165*   AST (SGOT) U/L 13   ALT (SGPT)  U/L 13     Results from last 7 days   Lab Units 11/22/22  0956 11/21/22  0250   CALCIUM mg/dL 7.9* 9.1   ALBUMIN g/dL  --  4.00     Results from last 7 days   Lab Units 11/21/22  0250   LIPASE U/L 20     Results from last 7 days   Lab Units 11/21/22  0250   TROPONIN T ng/mL 0.012           Invalid input(s): LDLCALC  Results from last 7 days   Lab Units 11/21/22  0632   URINECX  >100,000 CFU/mL Escherichia coli*         Test Results Pending at Discharge     Pending Labs     Order Current Status    Urine Culture - Urine, Urine, Clean Catch Preliminary result          Discharge Details        Discharge Medications      New Medications      Instructions Start Date   HYDROcodone-acetaminophen 5-325 MG per tablet  Commonly known as: Norco   2 tablets, Oral, Every 4 Hours PRN, 1-2tab PRN      ondansetron 4 MG tablet  Commonly known as: ZOFRAN   4 mg, Oral, Every 6 Hours PRN         Continue These Medications      Instructions Start Date   acetaminophen 500 MG tablet  Commonly known as: TYLENOL   500 mg, Oral, As Needed      apixaban 5 MG tablet tablet  Commonly known as: Eliquis   5 mg, Oral, Every 12 Hours      atorvastatin 80 MG tablet  Commonly known as: LIPITOR   1 tablet, Oral, Nightly      bimatoprost 0.01 % ophthalmic drops  Commonly known as: LUMIGAN   1 drop, Ophthalmic      clopidogrel 75 MG tablet  Commonly known as: PLAVIX   1 tablet, Oral, Daily      cyclobenzaprine 5 MG tablet  Commonly known as: FLEXERIL   5 mg, Oral, Every 8 Hours PRN      Diclofenac Sodium 1 % gel gel  Commonly known as: VOLTAREN   No dose, route, or frequency recorded.      empagliflozin 10 MG tablet tablet  Commonly known as: JARDIANCE   10 mg, Oral, Daily      furosemide 40 MG tablet  Commonly known as: LASIX   40 mg, Oral, Daily      Jakafi 15 MG chemo tablet  Generic drug: ruxolitinib   15 mg, Oral, 2 Times Daily      latanoprost 0.005 % ophthalmic solution  Commonly known as: XALATAN   No dose, route, or frequency recorded.       metoprolol succinate XL 25 MG 24 hr tablet  Commonly known as: TOPROL-XL   25 mg, 2 Times Daily      pantoprazole 40 MG EC tablet  Commonly known as: PROTONIX   40 mg, Oral, Daily      sertraline 50 MG tablet  Commonly known as: ZOLOFT   50 mg, Oral, Daily      zolpidem 5 MG tablet  Commonly known as: Ambien   5 mg, Oral, Nightly PRN             Allergies   Allergen Reactions   • Aspirin Unknown - Low Severity   • Oxycodone Unknown - Low Severity   • Diphenhydramine Hcl Anxiety and Unknown (See Comments)     Benadryl IVP       Discharge Disposition:  Home or Self Care      Discharge Diet:  Diet Order   Procedures   • Diet: Regular/House Diet; Texture: Mechanical Ground (NDD 2); Fluid Consistency: Thin (IDDSI 0)       Discharge Activity:  As tolerated      CODE STATUS:    Code Status and Medical Interventions:   Ordered at: 11/21/22 1025     Medical Intervention Limits:    NO intubation (DNI)     Code Status (Patient has no pulse and is not breathing):    No CPR (Do Not Attempt to Resuscitate)     Medical Interventions (Patient has pulse or is breathing):    Limited Support     Release to patient:    Routine Release       Future Appointments   Date Time Provider Department Center   11/28/2022  4:10 PM LAB CHAIR [facility] [place]   11/28/2022  4:20 PM SPECIALTY PHARMACY [facility]   11/28/2022  4:40 PM Darrell Reinoso MD [facility] [place]   12/13/2022  3:20 PM Dani Grover Jr., MD K  [facility]   2/22/2023  7:30 PM  [facility]EP [facility]      Follow-up Information     Kristi Moreau APRN .    Specialty: Nurse Practitioner  Contact information:  [address]  [address]  [place] [address] [phone]                         Time Spent on Discharge:  Greater than 38 minutes      ABEL Lizama  [facility]  11/22/22  16:37 EST

## 2022-11-22 NOTE — PROGRESS NOTES
Name: Catherine Boyer ADMIT: 2022   : 1940  PCP: Kristi Moreau APRN    MRN: [id number] LOS: 0 days   AGE/SEX: 82 y.o. female  ROOM: 126/1     Subjective   Subjective    feeling ok. Tolerating clears. No abdominal pain at this time.     Review of Systems   Constitutional: Negative for chills and fever.   Respiratory: Negative for cough and shortness of breath.    Cardiovascular: Negative for chest pain, palpitations and leg swelling.   Gastrointestinal: Negative for abdominal pain and nausea.   Genitourinary: Negative for dysuria.   Neurological: Negative for headaches.   Psychiatric/Behavioral: Negative for confusion.        Objective   Objective   Vital Signs  Temp:  [98.1 °F (36.7 °C)-99.6 °F (37.6 °C)] 99.6 °F (37.6 °C)  Heart Rate:  [] 102  Resp:  [16-18] 18  BP: ()/(47-67) 132/62  SpO2:  [93 %-99 %] 97 %  on  Flow (L/min):  [2] 2;   Device (Oxygen Therapy): nasal cannula  Body mass index is 29.16 kg/m².  Physical Exam  Vitals reviewed.   Constitutional:       Appearance: She is well-developed. She is not ill-appearing (frail ).   HENT:      Head: Normocephalic and atraumatic.   Cardiovascular:      Rate and Rhythm: Normal rate and regular rhythm.   Pulmonary:      Effort: Pulmonary effort is normal. No respiratory distress.      Breath sounds: No rales.   Abdominal:      General: Bowel sounds are normal. There is no distension.      Palpations: Abdomen is soft. There is no mass.      Tenderness: There is no abdominal tenderness.      Hernia: No hernia is present.   Musculoskeletal:      Right lower leg: No edema.      Left lower leg: No edema.   Skin:     General: Skin is warm and dry.   Neurological:      General: No focal deficit present.      Mental Status: She is alert and oriented to person, place, and time.   Psychiatric:         Mood and Affect: Mood normal.         Behavior: Behavior normal.       Results Review     I reviewed the patient's new clinical results.  Results  from last 7 days   Lab Units 11/22/22  0956 11/21/22  0250   WBC 10*3/mm3 35.59* 36.30*   HEMOGLOBIN g/dL 8.4* 10.5*   PLATELETS 10*3/mm3 151 220     Results from last 7 days   Lab Units 11/22/22  0956 11/21/22  0250   SODIUM mmol/L 137 136   POTASSIUM mmol/L 3.8 3.7   CHLORIDE mmol/L 102 96*   CO2 mmol/L 24.4 26.5   BUN mg/dL 12 21   CREATININE mg/dL 0.91 1.10*   GLUCOSE mg/dL 97 159*   EGFR mL/min/1.73 62.3 49.7*     Results from last 7 days   Lab Units 11/21/22  0250   ALBUMIN g/dL 4.00   BILIRUBIN mg/dL 0.5   ALK PHOS U/L 165*   AST (SGOT) U/L 13   ALT (SGPT) U/L 13     Results from last 7 days   Lab Units 11/22/22  0956 11/21/22  0250   CALCIUM mg/dL 7.9* 9.1   ALBUMIN g/dL  --  4.00     Results from last 7 days   Lab Units 11/21/22  0631 11/21/22  0250   LACTATE mmol/L 1.4 2.1*     Glucose   Date/Time Value Ref Range Status   11/22/2022 1104 90 70 - 130 mg/dL Final     Comment:     Meter: [id number] : [id number] Atkins DeYarelisDanisha NA   11/22/2022 0629 86 70 - 130 mg/dL Final     Comment:     Meter: [id number] : [id number] Jose Brigida NA   11/21/2022 2044 96 70 - 130 mg/dL Final     Comment:     Meter: [id number] : [id number] Garcia Brigida NA   11/21/2022 1618 96 70 - 130 mg/dL Final     Comment:     Meter: [id number] : [id number] Mccollum Yun NA   11/21/2022 1116 102 70 - 130 mg/dL Final     Comment:     Meter: [id number] : [id number] Mccollum Yun NA   11/21/2022 0701 141 (H) 70 - 130 mg/dL Final     Comment:     Meter: [id number] : [id number] [place] Kyra RN       US Gallbladder    Result Date: 11/21/2022  Electronically signed by Stan Barclay M.D. on 11-21-22 at 0506    Scheduled Medications  apixaban, 5 mg, Oral, Q12H  aspirin, 81 mg, Oral, Daily  atorvastatin, 80 mg, Oral, Nightly  cefTRIAXone, 1 g, Intravenous, Q24H  famotidine, 20 mg, Intravenous, Q12H  insulin lispro, 0-9 Units, Subcutaneous, 4x Daily With Meals & Nightly  latanoprost, 1 drop, Both Eyes,  Nightly  metoprolol succinate XL, 25 mg, Oral, BID  miconazole, , Topical, Q12H  PATIENT SUPPLIED MEDICATION, 15 mg, Oral, BID  sertraline, 50 mg, Oral, Daily  sodium chloride, 10 mL, Intravenous, Q12H    Infusions  sodium chloride, 75 mL/hr, Last Rate: 75 mL/hr (11/22/22 0848)    Diet  Diet: Regular/House Diet; Texture: Mechanical Ground (NDD 2); Fluid Consistency: Thin (IDDSI 0)       Assessment/Plan     Active Hospital Problems    Diagnosis  POA   • **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]  Yes   • Acute UTI (urinary tract infection) [N39.0]  Yes   • Chronic diastolic (congestive) heart failure (HCC) [I50.32]  Yes   • Personal history of transient ischemic attack (TIA), 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 deficits [Z86.73]  Not Applicable   • Hypertension [I10]  Yes   • GERD (gastroesophageal reflux disease) [K21.9]  Yes   • Type 2 diabetes mellitus (HCC) [E11.9]  Yes   • Paroxysmal atrial fibrillation (HCC) [I48.0]  Yes   • Myeloproliferative disorder (HCC) [D47.1]  Yes   • CAD (coronary artery disease) [I25.10]  Yes      Resolved Hospital Problems   No resolved problems to display.       82 y.o. female admitted with Porcelain gallbladder.  Ms. Boyer is a 82 year old female who presented to the hospital with complaints of abdominal pain, nausea and vomiting. She was found to have a porcelain gallbladder last admission with ongoing colicky abdominal pain. She preferred outpatient work up, but had worsening symptoms prompting admission.    · Porcelain gallbladder: With ongoing and worsening symptoms to suggest need for cholecystectomy. Needs to be off eliquis 2 days (11/23) and plavix 5days (11/25). Cardiology clearance given 3mo out from PCI. Cardiology recommendations: hold plavix but Start [id number] but hold day of surgery. Continue eliquis until 2 days prior to scheduled surgery (TBD per surgeon). Resume eilquis and plavix post op and stop ASA     · Acute UTI: + UA with symptoms.  cover with Rocephin and await  culture.    · CAD/CHF/PAF: Followed by [facility]. S/P stent 9/2/22. On Eliquis/Plavix. Resume BB and hold blood thinners temporarily until Cardiology/surgery can evaluate. Resume statin (LFTs are normal). Denies any cardiac complaints and appears euvolemic.  Hold Lasix for now given poor oral intake. See cardio recs regarding plavix OAC above.     · DM2: SSI and monitor ACHS. Hold oral agents.    · Myeloproliferative disorder: On Jakafi. Will hold for now with acute UTI and GI symptoms. WBC are chronically elevated, but higher than prior possible due to cholecystitis. Hold Jakafi 2 days prior to surgery and 2 days after.     · GERD: IV Pepcid ordered here.    · I discussed the patients findings and my recommendations with patient, family, nursing staff and Dr. Doan.    VTE Prophylaxis - SCDs. Use SCDs until Eliquis can be resumed  Code Status - No CPR or intubation. Discussed with patient and daughter at bedside.        ABEL Lizama  [facility]  11/22/22  14:42 EST

## 2022-11-22 NOTE — TELEPHONE ENCOUNTER
----- Message from Lidia Frazier RPH sent at 11/22/2022 10:03 AM EST -----  Regarding: RE: marcelo Reinoso,     Per , they do not recommend holding in patient with myelofibrosis due to withdrawal syndrome.         Withdrawal syndrome: Acute relapse of myelofibrosis symptoms (eg, fever, respiratory distress, hypotension, DIC, multiorgan failure), splenomegaly, worsening cytopenias, hemodynamic compensation, and septic shock-like syndrome have been reported with treatment tapering or discontinuation (Iman 2011). Symptoms generally return over approximately 1 week. Patients should not interrupt/discontinue treatment without consulting healthcare provider.    Thanks,   Lidia    ----- Message -----  From: Darrell Reinoso MD  Sent: 11/21/2022   4:52 PM EST  To: [facility] Oral Onc Pool  Subject: jakafi                                           Recommendations on holding jakafi pre- and post-op? May need her gallbladder out. 48 hours pre- and post-op? Open to other suggestions. radha

## 2022-11-22 NOTE — PLAN OF CARE
Goal Outcome Evaluation:  Plan of Care Reviewed With: patient        Progress: no change  Outcome Evaluation: vitals stable.  pt denied pain.  meds given per orders.  mirtha care as needed.  external catheter in place.  will continue to monitor.

## 2022-11-22 NOTE — DISCHARGE INSTRUCTIONS
Surgery date Monday, Nov 28th @ 11:30  Hold  eliquis 2 days prior to surgery. Hold Plavix now but Start Aspirin 81mg but hold day of surgery. Continue eliquis until 2 days prior to scheduled surgery (TBD per surgeon). Resume eilquis and plavix post operative. When you resume plavix you will stop the aspirin.     Jakafi: HOLD two days prior to surgery, STOP Sat  Nov 26th and two days following surgery .

## 2022-11-22 NOTE — PLAN OF CARE
Goal Outcome Evaluation:       Outcome Evaluation: VSS, pt denies pain, reports feeling better, plan for sx next week, pt's daughter at bedside

## 2022-11-22 NOTE — TELEPHONE ENCOUNTER
----- Message from Dani Grover Jr., MD sent at 11/22/2022  2:59 PM EST -----  Regarding: Schedule  Please schedule this patient for a laparoscopic cholecystectomy with intraoperative cholangiogram for next week.  Please have her stop her Eliquis for 2 days prior to surgery.  Thanks

## 2022-11-22 NOTE — DISCHARGE PLACEMENT REQUEST
"Maribell Boyer (82 y.o. Female)     Date of Birth   1940    Social Security Number       Address   [address] [place] [address] [address] [place] [address]    Home Phone   [phone]    MRN   [id number]       Yarsanism   Unknown    Marital Status                               Admission Date   11/21/22    Admission Type   Emergency    Admitting Provider   Junior Doan MD    Attending Provider   Junior Doan MD    Department, Room/Bed   [facility] OBSERVATION, 126/1       Discharge Date       Discharge Disposition   Home or Self Care    Discharge Destination                               Attending Provider: Junior Doan MD    Allergies: Aspirin, Oxycodone, Diphenhydramine Hcl    Isolation: None   Infection: COVID (History) (11/21/22)   Code Status: No CPR    Ht: 154.9 cm (61\")   Wt: 70 kg (154 lb 5.2 oz)    Admission Cmt: None   Principal Problem: 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]                 Active Insurance as of 11/21/2022     Primary Coverage     Payor Plan Insurance Group Employer/Plan Group    AETNA MEDICARE REPLACEMENT AETNA MEDICARE REPLACEMENT [id number]     Payor Plan Address Payor Plan Phone Number Payor Plan Fax Number Effective Dates    [address] [phone]  1/1/2022 - None Entered    [facility] [id number]       Subscriber Name Subscriber Birth Date Member ID       MARIBELL BOYER 1940 [id number]                 Emergency Contacts      (Rel.) Home Phone Work Phone Mobile Phone    RAY BOYER (Daughter) [phone] -- [phone]    Karen Gutierrez (Daughter) -- -- [phone]    Greg Boyer (Son) -- -- [phone]    Rei Boyer (Brother) -- -- --              "

## 2022-11-23 ENCOUNTER — DOCUMENTATION (OUTPATIENT)
Dept: SOCIAL WORK | Facility: HOSPITAL | Age: 82
End: 2022-11-23

## 2022-11-23 LAB — BACTERIA SPEC AEROBE CULT: ABNORMAL

## 2022-11-23 RX ORDER — NITROFURANTOIN 25; 75 MG/1; MG/1
100 CAPSULE ORAL 2 TIMES DAILY
Qty: 10 CAPSULE | Refills: 0 | Status: SHIPPED | OUTPATIENT
Start: 2022-11-23 | End: 2022-12-01 | Stop reason: HOSPADM

## 2022-11-23 NOTE — PROGRESS NOTES
11/23/2022 1019 - Chart opened to contact Pt/POA per Shahana ROMAN request to inform them that urine culture was resistant to prescribed antibiotic and that a new antibiotic has been prescribed, to stop current antibiotic and start new one. Spoke with Karen daughter/POA and she voices understanding and requests med be sent to [facility] on [facility]. Message given to Shahana ROMAN.

## 2022-11-23 NOTE — OUTREACH NOTE
Prep Survey    Flowsheet Row Responses   [facility] facility patient discharged from? [place]   Is LACE score < 7 ? No   Emergency Room discharge w/ pulse ox? No   Eligibility Readm Mgmt   Discharge diagnosis Porcelain gallbladder   Does the patient have one of the following disease processes/diagnoses(primary or secondary)? Other   Does the patient have Home health ordered? No   Is there a DME ordered? No   Comments regarding appointments return to [facility]  and return for elective cholecystectomy next week.   Prep survey completed? Yes          LOKESH GARIBAY - Registered Nurse

## 2022-11-28 ENCOUNTER — APPOINTMENT (OUTPATIENT)
Dept: LAB | Facility: HOSPITAL | Age: 82
End: 2022-11-28

## 2022-11-28 ENCOUNTER — APPOINTMENT (OUTPATIENT)
Dept: ONCOLOGY | Facility: HOSPITAL | Age: 82
End: 2022-11-28

## 2022-11-28 ENCOUNTER — APPOINTMENT (OUTPATIENT)
Dept: GENERAL RADIOLOGY | Facility: HOSPITAL | Age: 82
End: 2022-11-28

## 2022-11-28 ENCOUNTER — HOSPITAL ENCOUNTER (OUTPATIENT)
Facility: HOSPITAL | Age: 82
Discharge: HOME | End: 2022-12-01
Attending: SURGERY | Admitting: SURGERY

## 2022-11-28 ENCOUNTER — ANESTHESIA (OUTPATIENT)
Dept: PERIOP | Facility: HOSPITAL | Age: 82
End: 2022-11-28

## 2022-11-28 ENCOUNTER — ANESTHESIA EVENT (OUTPATIENT)
Dept: PERIOP | Facility: HOSPITAL | Age: 82
End: 2022-11-28

## 2022-11-28 DIAGNOSIS — K81.9 CHOLECYSTITIS: ICD-10-CM

## 2022-11-28 LAB
ABO GROUP BLD: NORMAL
BLD GP AB SCN SERPL QL: NEGATIVE
CYTO UR: NORMAL
DX PRELIMINARY: NORMAL
GLUCOSE BLDC GLUCOMTR-MCNC: 128 MG/DL (ref 70–130)
GLUCOSE BLDC GLUCOMTR-MCNC: 172 MG/DL (ref 70–130)
HBA1C MFR BLD: 6.7 % (ref 4.8–5.6)
LAB AP CASE REPORT: NORMAL
LAB AP CLINICAL INFORMATION: NORMAL
LAB AP FLOW CYTOMETRY SUMMARY: NORMAL
LAB AP SPECIAL STAINS: NORMAL
Lab: NORMAL
Lab: NORMAL
PATH REPORT.ADDENDUM SPEC: NORMAL
PATH REPORT.FINAL DX SPEC: NORMAL
PATH REPORT.GROSS SPEC: NORMAL
RH BLD: POSITIVE
T&S EXPIRATION DATE: NORMAL

## 2022-11-28 PROCEDURE — 88304 TISSUE EXAM BY PATHOLOGIST: CPT | Performed by: SURGERY

## 2022-11-28 PROCEDURE — 25010000002 ONDANSETRON PER 1 MG

## 2022-11-28 PROCEDURE — 25010000002 ONDANSETRON PER 1 MG: Performed by: SURGERY

## 2022-11-28 PROCEDURE — 25010000002 PROPOFOL 10 MG/ML EMULSION

## 2022-11-28 PROCEDURE — 86901 BLOOD TYPING SEROLOGIC RH(D): CPT | Performed by: ANESTHESIOLOGY

## 2022-11-28 PROCEDURE — 25010000002 CEFAZOLIN IN DEXTROSE 2-4 GM/100ML-% SOLUTION: Performed by: SURGERY

## 2022-11-28 PROCEDURE — G0378 HOSPITAL OBSERVATION PER HR: HCPCS

## 2022-11-28 PROCEDURE — 25010000002 IOPAMIDOL 61 % SOLUTION: Performed by: SURGERY

## 2022-11-28 PROCEDURE — 86850 RBC ANTIBODY SCREEN: CPT | Performed by: ANESTHESIOLOGY

## 2022-11-28 PROCEDURE — 25010000002 PIPERACILLIN SOD-TAZOBACTAM PER 1 G: Performed by: SURGERY

## 2022-11-28 PROCEDURE — 86900 BLOOD TYPING SEROLOGIC ABO: CPT | Performed by: ANESTHESIOLOGY

## 2022-11-28 PROCEDURE — 63710000001 PROMETHAZINE PER 25 MG

## 2022-11-28 PROCEDURE — 25010000002 DEXAMETHASONE SODIUM PHOSPHATE 20 MG/5ML SOLUTION

## 2022-11-28 PROCEDURE — 82962 GLUCOSE BLOOD TEST: CPT

## 2022-11-28 PROCEDURE — 74300 X-RAY BILE DUCTS/PANCREAS: CPT

## 2022-11-28 PROCEDURE — 25010000002 FENTANYL CITRATE (PF) 50 MCG/ML SOLUTION

## 2022-11-28 PROCEDURE — 47563 LAPARO CHOLECYSTECTOMY/GRAPH: CPT | Performed by: SPECIALIST/TECHNOLOGIST, OTHER

## 2022-11-28 PROCEDURE — 83036 HEMOGLOBIN GLYCOSYLATED A1C: CPT | Performed by: NURSE PRACTITIONER

## 2022-11-28 PROCEDURE — 47563 LAPARO CHOLECYSTECTOMY/GRAPH: CPT | Performed by: SURGERY

## 2022-11-28 PROCEDURE — 25010000002 ONDANSETRON PER 1 MG: Performed by: ANESTHESIOLOGY

## 2022-11-28 DEVICE — HORIZON TI ML 6 CLIPS/CART
Type: IMPLANTABLE DEVICE | Site: ABDOMEN | Status: FUNCTIONAL
Brand: WECK

## 2022-11-28 RX ORDER — BUPIVACAINE HYDROCHLORIDE AND EPINEPHRINE 5; 5 MG/ML; UG/ML
INJECTION, SOLUTION EPIDURAL; INTRACAUDAL; PERINEURAL AS NEEDED
Status: DISCONTINUED | OUTPATIENT
Start: 2022-11-28 | End: 2022-11-28 | Stop reason: HOSPADM

## 2022-11-28 RX ORDER — ATORVASTATIN CALCIUM 20 MG/1
80 TABLET, FILM COATED ORAL NIGHTLY
Status: DISCONTINUED | OUTPATIENT
Start: 2022-11-28 | End: 2022-12-01 | Stop reason: HOSPADM

## 2022-11-28 RX ORDER — HYDRALAZINE HYDROCHLORIDE 20 MG/ML
5 INJECTION INTRAMUSCULAR; INTRAVENOUS
Status: DISCONTINUED | OUTPATIENT
Start: 2022-11-28 | End: 2022-11-28 | Stop reason: HOSPADM

## 2022-11-28 RX ORDER — ONDANSETRON 2 MG/ML
4 INJECTION INTRAMUSCULAR; INTRAVENOUS ONCE AS NEEDED
Status: COMPLETED | OUTPATIENT
Start: 2022-11-28 | End: 2022-11-28

## 2022-11-28 RX ORDER — EPHEDRINE SULFATE 50 MG/ML
5 INJECTION, SOLUTION INTRAVENOUS ONCE AS NEEDED
Status: DISCONTINUED | OUTPATIENT
Start: 2022-11-28 | End: 2022-11-28 | Stop reason: HOSPADM

## 2022-11-28 RX ORDER — NALOXONE HCL 0.4 MG/ML
0.2 VIAL (ML) INJECTION AS NEEDED
Status: DISCONTINUED | OUTPATIENT
Start: 2022-11-28 | End: 2022-11-28 | Stop reason: HOSPADM

## 2022-11-28 RX ORDER — ZOLPIDEM TARTRATE 5 MG/1
5 TABLET ORAL NIGHTLY PRN
Status: DISCONTINUED | OUTPATIENT
Start: 2022-11-28 | End: 2022-11-28

## 2022-11-28 RX ORDER — NICOTINE POLACRILEX 4 MG
15 LOZENGE BUCCAL
Status: DISCONTINUED | OUTPATIENT
Start: 2022-11-28 | End: 2022-12-01 | Stop reason: HOSPADM

## 2022-11-28 RX ORDER — FAMOTIDINE 10 MG/ML
20 INJECTION, SOLUTION INTRAVENOUS ONCE
Status: COMPLETED | OUTPATIENT
Start: 2022-11-28 | End: 2022-11-28

## 2022-11-28 RX ORDER — CEFAZOLIN SODIUM 2 G/100ML
2 INJECTION, SOLUTION INTRAVENOUS ONCE
Status: COMPLETED | OUTPATIENT
Start: 2022-11-28 | End: 2022-11-28

## 2022-11-28 RX ORDER — SODIUM CHLORIDE 9 MG/ML
INJECTION, SOLUTION INTRAVENOUS AS NEEDED
Status: DISCONTINUED | OUTPATIENT
Start: 2022-11-28 | End: 2022-11-28 | Stop reason: HOSPADM

## 2022-11-28 RX ORDER — FENTANYL CITRATE 50 UG/ML
50 INJECTION, SOLUTION INTRAMUSCULAR; INTRAVENOUS
Status: DISCONTINUED | OUTPATIENT
Start: 2022-11-28 | End: 2022-11-28 | Stop reason: HOSPADM

## 2022-11-28 RX ORDER — FLUMAZENIL 0.1 MG/ML
0.2 INJECTION INTRAVENOUS AS NEEDED
Status: DISCONTINUED | OUTPATIENT
Start: 2022-11-28 | End: 2022-11-28 | Stop reason: HOSPADM

## 2022-11-28 RX ORDER — ENOXAPARIN SODIUM 100 MG/ML
40 INJECTION SUBCUTANEOUS DAILY
Status: DISCONTINUED | OUTPATIENT
Start: 2022-11-29 | End: 2022-12-01 | Stop reason: HOSPADM

## 2022-11-28 RX ORDER — MORPHINE SULFATE 2 MG/ML
2 INJECTION, SOLUTION INTRAMUSCULAR; INTRAVENOUS
Status: DISCONTINUED | OUTPATIENT
Start: 2022-11-28 | End: 2022-12-01 | Stop reason: HOSPADM

## 2022-11-28 RX ORDER — SODIUM CHLORIDE 0.9 % (FLUSH) 0.9 %
3-10 SYRINGE (ML) INJECTION AS NEEDED
Status: DISCONTINUED | OUTPATIENT
Start: 2022-11-28 | End: 2022-11-28 | Stop reason: HOSPADM

## 2022-11-28 RX ORDER — PANTOPRAZOLE SODIUM 40 MG/1
40 TABLET, DELAYED RELEASE ORAL DAILY
Status: DISCONTINUED | OUTPATIENT
Start: 2022-11-28 | End: 2022-12-01 | Stop reason: HOSPADM

## 2022-11-28 RX ORDER — LIDOCAINE HYDROCHLORIDE 20 MG/ML
INJECTION, SOLUTION EPIDURAL; INFILTRATION; INTRACAUDAL; PERINEURAL AS NEEDED
Status: DISCONTINUED | OUTPATIENT
Start: 2022-11-28 | End: 2022-11-28 | Stop reason: SURG

## 2022-11-28 RX ORDER — PROPOFOL 10 MG/ML
VIAL (ML) INTRAVENOUS AS NEEDED
Status: DISCONTINUED | OUTPATIENT
Start: 2022-11-28 | End: 2022-11-28 | Stop reason: SURG

## 2022-11-28 RX ORDER — INSULIN LISPRO 100 [IU]/ML
0-9 INJECTION, SOLUTION INTRAVENOUS; SUBCUTANEOUS
Status: DISCONTINUED | OUTPATIENT
Start: 2022-11-29 | End: 2022-12-01 | Stop reason: HOSPADM

## 2022-11-28 RX ORDER — BUPIVACAINE HCL/0.9 % NACL/PF 0.125 %
PLASTIC BAG, INJECTION (ML) EPIDURAL AS NEEDED
Status: DISCONTINUED | OUTPATIENT
Start: 2022-11-28 | End: 2022-11-28 | Stop reason: SURG

## 2022-11-28 RX ORDER — EPHEDRINE SULFATE 50 MG/ML
INJECTION INTRAVENOUS AS NEEDED
Status: DISCONTINUED | OUTPATIENT
Start: 2022-11-28 | End: 2022-11-28 | Stop reason: SURG

## 2022-11-28 RX ORDER — PROMETHAZINE HYDROCHLORIDE 25 MG/1
25 SUPPOSITORY RECTAL ONCE AS NEEDED
Status: COMPLETED | OUTPATIENT
Start: 2022-11-28 | End: 2022-11-28

## 2022-11-28 RX ORDER — UREA 10 %
3 LOTION (ML) TOPICAL NIGHTLY PRN
Status: DISCONTINUED | OUTPATIENT
Start: 2022-11-28 | End: 2022-12-01 | Stop reason: HOSPADM

## 2022-11-28 RX ORDER — SODIUM CHLORIDE 0.9 % (FLUSH) 0.9 %
3 SYRINGE (ML) INJECTION EVERY 12 HOURS SCHEDULED
Status: DISCONTINUED | OUTPATIENT
Start: 2022-11-28 | End: 2022-11-28 | Stop reason: HOSPADM

## 2022-11-28 RX ORDER — ONDANSETRON 2 MG/ML
INJECTION INTRAMUSCULAR; INTRAVENOUS AS NEEDED
Status: DISCONTINUED | OUTPATIENT
Start: 2022-11-28 | End: 2022-11-28 | Stop reason: SURG

## 2022-11-28 RX ORDER — ONDANSETRON 4 MG/1
4 TABLET, FILM COATED ORAL EVERY 6 HOURS PRN
Status: DISCONTINUED | OUTPATIENT
Start: 2022-11-28 | End: 2022-11-28

## 2022-11-28 RX ORDER — ONDANSETRON 4 MG/1
4 TABLET, FILM COATED ORAL EVERY 4 HOURS PRN
Status: DISCONTINUED | OUTPATIENT
Start: 2022-11-28 | End: 2022-11-28

## 2022-11-28 RX ORDER — ONDANSETRON 2 MG/ML
4 INJECTION INTRAMUSCULAR; INTRAVENOUS EVERY 4 HOURS PRN
Status: DISCONTINUED | OUTPATIENT
Start: 2022-11-28 | End: 2022-12-01 | Stop reason: HOSPADM

## 2022-11-28 RX ORDER — DEXAMETHASONE SODIUM PHOSPHATE 4 MG/ML
INJECTION, SOLUTION INTRA-ARTICULAR; INTRALESIONAL; INTRAMUSCULAR; INTRAVENOUS; SOFT TISSUE AS NEEDED
Status: DISCONTINUED | OUTPATIENT
Start: 2022-11-28 | End: 2022-11-28 | Stop reason: SURG

## 2022-11-28 RX ORDER — PROMETHAZINE HYDROCHLORIDE 25 MG/1
25 TABLET ORAL ONCE AS NEEDED
Status: COMPLETED | OUTPATIENT
Start: 2022-11-28 | End: 2022-11-28

## 2022-11-28 RX ORDER — ROCURONIUM BROMIDE 10 MG/ML
INJECTION, SOLUTION INTRAVENOUS AS NEEDED
Status: DISCONTINUED | OUTPATIENT
Start: 2022-11-28 | End: 2022-11-28 | Stop reason: SURG

## 2022-11-28 RX ORDER — HYDROCODONE BITARTRATE AND ACETAMINOPHEN 7.5; 325 MG/1; MG/1
1 TABLET ORAL ONCE AS NEEDED
Status: DISCONTINUED | OUTPATIENT
Start: 2022-11-28 | End: 2022-11-28 | Stop reason: HOSPADM

## 2022-11-28 RX ORDER — METOPROLOL SUCCINATE 25 MG/1
25 TABLET, EXTENDED RELEASE ORAL 2 TIMES DAILY
Status: DISCONTINUED | OUTPATIENT
Start: 2022-11-28 | End: 2022-12-01 | Stop reason: HOSPADM

## 2022-11-28 RX ORDER — DEXTROSE MONOHYDRATE 25 G/50ML
25 INJECTION, SOLUTION INTRAVENOUS
Status: DISCONTINUED | OUTPATIENT
Start: 2022-11-28 | End: 2022-12-01 | Stop reason: HOSPADM

## 2022-11-28 RX ORDER — LABETALOL HYDROCHLORIDE 5 MG/ML
5 INJECTION, SOLUTION INTRAVENOUS
Status: DISCONTINUED | OUTPATIENT
Start: 2022-11-28 | End: 2022-11-28 | Stop reason: HOSPADM

## 2022-11-28 RX ORDER — MAGNESIUM HYDROXIDE 1200 MG/15ML
LIQUID ORAL AS NEEDED
Status: DISCONTINUED | OUTPATIENT
Start: 2022-11-28 | End: 2022-11-28 | Stop reason: HOSPADM

## 2022-11-28 RX ORDER — GLYCOPYRROLATE 0.2 MG/ML
INJECTION INTRAMUSCULAR; INTRAVENOUS AS NEEDED
Status: DISCONTINUED | OUTPATIENT
Start: 2022-11-28 | End: 2022-11-28 | Stop reason: SURG

## 2022-11-28 RX ORDER — MIDAZOLAM HYDROCHLORIDE 1 MG/ML
0.5 INJECTION INTRAMUSCULAR; INTRAVENOUS
Status: DISCONTINUED | OUTPATIENT
Start: 2022-11-28 | End: 2022-11-28 | Stop reason: HOSPADM

## 2022-11-28 RX ORDER — HYDROMORPHONE HYDROCHLORIDE 1 MG/ML
0.5 INJECTION, SOLUTION INTRAMUSCULAR; INTRAVENOUS; SUBCUTANEOUS
Status: DISCONTINUED | OUTPATIENT
Start: 2022-11-28 | End: 2022-11-28 | Stop reason: HOSPADM

## 2022-11-28 RX ORDER — NALOXONE HCL 0.4 MG/ML
0.4 VIAL (ML) INJECTION
Status: DISCONTINUED | OUTPATIENT
Start: 2022-11-28 | End: 2022-12-01 | Stop reason: HOSPADM

## 2022-11-28 RX ORDER — FENTANYL CITRATE 50 UG/ML
INJECTION, SOLUTION INTRAMUSCULAR; INTRAVENOUS AS NEEDED
Status: DISCONTINUED | OUTPATIENT
Start: 2022-11-28 | End: 2022-11-28 | Stop reason: SURG

## 2022-11-28 RX ORDER — SODIUM CHLORIDE, SODIUM LACTATE, POTASSIUM CHLORIDE, CALCIUM CHLORIDE 600; 310; 30; 20 MG/100ML; MG/100ML; MG/100ML; MG/100ML
9 INJECTION, SOLUTION INTRAVENOUS CONTINUOUS
Status: DISCONTINUED | OUTPATIENT
Start: 2022-11-28 | End: 2022-11-28

## 2022-11-28 RX ORDER — ONDANSETRON 4 MG/1
4 TABLET, FILM COATED ORAL EVERY 6 HOURS PRN
Status: DISCONTINUED | OUTPATIENT
Start: 2022-11-28 | End: 2022-12-01 | Stop reason: HOSPADM

## 2022-11-28 RX ORDER — LIDOCAINE HYDROCHLORIDE 10 MG/ML
0.5 INJECTION, SOLUTION EPIDURAL; INFILTRATION; INTRACAUDAL; PERINEURAL ONCE AS NEEDED
Status: DISCONTINUED | OUTPATIENT
Start: 2022-11-28 | End: 2022-11-28 | Stop reason: HOSPADM

## 2022-11-28 RX ORDER — ONDANSETRON 2 MG/ML
4 INJECTION INTRAMUSCULAR; INTRAVENOUS EVERY 6 HOURS PRN
Status: DISCONTINUED | OUTPATIENT
Start: 2022-11-28 | End: 2022-11-28

## 2022-11-28 RX ADMIN — ONDANSETRON 4 MG: 2 INJECTION INTRAMUSCULAR; INTRAVENOUS at 15:49

## 2022-11-28 RX ADMIN — PROPOFOL 120 MG: 10 INJECTION, EMULSION INTRAVENOUS at 14:06

## 2022-11-28 RX ADMIN — SODIUM CHLORIDE, POTASSIUM CHLORIDE, SODIUM LACTATE AND CALCIUM CHLORIDE 9 ML/HR: 600; 310; 30; 20 INJECTION, SOLUTION INTRAVENOUS at 11:50

## 2022-11-28 RX ADMIN — PANTOPRAZOLE SODIUM 40 MG: 40 TABLET, DELAYED RELEASE ORAL at 22:44

## 2022-11-28 RX ADMIN — SUGAMMADEX 200 MG: 100 INJECTION, SOLUTION INTRAVENOUS at 15:10

## 2022-11-28 RX ADMIN — FENTANYL CITRATE 25 MCG: 0.05 INJECTION, SOLUTION INTRAMUSCULAR; INTRAVENOUS at 14:48

## 2022-11-28 RX ADMIN — FENTANYL CITRATE 25 MCG: 0.05 INJECTION, SOLUTION INTRAMUSCULAR; INTRAVENOUS at 14:21

## 2022-11-28 RX ADMIN — Medication 50 MCG: at 14:51

## 2022-11-28 RX ADMIN — FAMOTIDINE 20 MG: 10 INJECTION INTRAVENOUS at 11:50

## 2022-11-28 RX ADMIN — ONDANSETRON 4 MG: 2 INJECTION INTRAMUSCULAR; INTRAVENOUS at 15:01

## 2022-11-28 RX ADMIN — EPHEDRINE SULFATE 10 MG: 50 INJECTION INTRAVENOUS at 14:48

## 2022-11-28 RX ADMIN — FENTANYL CITRATE 50 MCG: 0.05 INJECTION, SOLUTION INTRAMUSCULAR; INTRAVENOUS at 16:02

## 2022-11-28 RX ADMIN — SERTRALINE HYDROCHLORIDE 50 MG: 50 TABLET, FILM COATED ORAL at 22:44

## 2022-11-28 RX ADMIN — GLYCOPYRROLATE 0.1 MG: 1 INJECTION INTRAMUSCULAR; INTRAVENOUS at 14:25

## 2022-11-28 RX ADMIN — Medication 200 MCG: at 14:24

## 2022-11-28 RX ADMIN — ROCURONIUM BROMIDE 30 MG: 10 INJECTION, SOLUTION INTRAVENOUS at 14:06

## 2022-11-28 RX ADMIN — Medication 100 MCG: at 14:37

## 2022-11-28 RX ADMIN — ONDANSETRON 4 MG: 2 INJECTION INTRAMUSCULAR; INTRAVENOUS at 20:04

## 2022-11-28 RX ADMIN — Medication 3 MG: at 22:45

## 2022-11-28 RX ADMIN — ATORVASTATIN CALCIUM 80 MG: 20 TABLET, FILM COATED ORAL at 22:44

## 2022-11-28 RX ADMIN — PROMETHAZINE HYDROCHLORIDE 25 MG: 25 TABLET ORAL at 16:20

## 2022-11-28 RX ADMIN — METOPROLOL SUCCINATE 25 MG: 25 TABLET, EXTENDED RELEASE ORAL at 22:44

## 2022-11-28 RX ADMIN — LIDOCAINE HYDROCHLORIDE 60 MG: 20 INJECTION, SOLUTION EPIDURAL; INFILTRATION; INTRACAUDAL; PERINEURAL at 14:06

## 2022-11-28 RX ADMIN — CEFAZOLIN SODIUM 2 G: 2 INJECTION, SOLUTION INTRAVENOUS at 13:52

## 2022-11-28 RX ADMIN — EPHEDRINE SULFATE 5 MG: 50 INJECTION INTRAVENOUS at 14:51

## 2022-11-28 RX ADMIN — TAZOBACTAM SODIUM AND PIPERACILLIN SODIUM 3.38 G: 375; 3 INJECTION, SOLUTION INTRAVENOUS at 22:45

## 2022-11-28 RX ADMIN — Medication 200 MCG: at 14:43

## 2022-11-28 RX ADMIN — DEXAMETHASONE SODIUM PHOSPHATE 6 MG: 4 INJECTION, SOLUTION INTRAMUSCULAR; INTRAVENOUS at 14:25

## 2022-11-28 NOTE — ANESTHESIA PREPROCEDURE EVALUATION
Anesthesia Evaluation     Patient summary reviewed and Nursing notes reviewed                Airway   Mallampati: II  TM distance: >3 FB  Dental      Pulmonary - negative pulmonary ROS   Cardiovascular     ECG reviewed  Rhythm: irregular  Rate: normal    (+) hypertension, CAD, cardiac stents Drug eluting stent within the past 12 months dysrhythmias PVC, Paroxysmal Atrial Fib, CHF ,       Neuro/Psych  (+) TIA,    GI/Hepatic/Renal/Endo    (+)  GERD, PUD,  diabetes mellitus type 2,     Musculoskeletal (-) negative ROS    Abdominal    Substance History - negative use     OB/GYN negative ob/gyn ROS         Other   blood dyscrasia,   history of cancer                    Anesthesia Plan    ASA 4 - emergent     general     (Full upper dentures have been removed   Patient has 2 remaining lower natural teeth  Myeloproliferative disorder with anemia  CAD s/p stent 9/22  Higher relative risk secondary to numerous existing medical pathos and emergent medical situation    I have reviewed the patient's history with the patient and the chart, including all pertinent laboratory results and imaging. I have explained the risks of anesthesia including but not limited to dental damage, corneal abrasion, nerve injury, MI, stroke, and death. Questions asked and answered. Anesthetic plan discussed with patient and team as indicated. Patient expressed understanding of the above.)  intravenous induction     Anesthetic plan, risks, benefits, and alternatives have been provided, discussed and informed consent has been obtained with: patient.        CODE STATUS:

## 2022-11-28 NOTE — ANESTHESIA PROCEDURE NOTES
Airway  Urgency: emergent    Date/Time: 11/28/2022 2:09 PM  Airway not difficult    General Information and Staff    Patient location during procedure: OR  Anesthesiologist: Kevin Castellano MD  CRNA/CAA: Marimar Simons CRNA    Consent for Airway (if performed for an anesthetic, see related documentation for consents)  Patient identity confirmed: verbally with patient, arm band and hospital-assigned identification number  Consent: No emergent situation. Verbal consent obtained. Written consent obtained.  Risks and benefits: risks, benefits and alternatives were discussed  Consent given by: patient      Indications and Patient Condition  Indications for airway management: airway protection    Preoxygenated: yes  MILS maintained throughout  Mask difficulty assessment: 1 - vent by mask    Final Airway Details  Final airway type: endotracheal airway      Successful airway: ETT  Cuffed: yes   Successful intubation technique: direct laryngoscopy  Facilitating devices/methods: intubating stylet  Blade: Abril  Blade size: 3  ETT size (mm): 7.0  Cormack-Lehane Classification: grade I - full view of glottis  Placement verified by: chest auscultation and capnometry   Cuff volume (mL): 7  Measured from: gums  ETT/EBT to gums (cm): 21  Number of attempts at approach: 1  Assessment: lips, teeth, and gum same as pre-op and atraumatic intubation

## 2022-11-28 NOTE — ANESTHESIA POSTPROCEDURE EVALUATION
Patient: Catherine Boyer    Procedure Summary     Date: 11/28/22 Room / Location: [facility] OR  / [facility] MAIN OR    Anesthesia Start: 1356 Anesthesia Stop: 1531    Procedure: CHOLECYSTECTOMY LAPAROSCOPIC INTRAOPERATIVE CHOLANGIOGRAM (Abdomen) Diagnosis:       Cholecystitis      (Cholecystitis [K81.9])    Surgeons: Dani Grover Jr., MD Provider: Kevin Castellano MD    Anesthesia Type: general ASA Status: 4 - Emergent          Anesthesia Type: general    Vitals  Vitals Value Taken Time   /59 11/28/22 1551   Temp 36.9 °C (98.4 °F) 11/28/22 1535   Pulse 76 11/28/22 1607   Resp 16 11/28/22 1535   SpO2 94 % 11/28/22 1607   Vitals shown include unvalidated device data.        Post Anesthesia Care and Evaluation    Patient location during evaluation: PACU  Patient participation: complete - patient participated  Level of consciousness: awake and alert  Pain management: adequate    Airway patency: patent  Anesthetic complications: No anesthetic complications    Cardiovascular status: acceptable  Respiratory status: acceptable  Hydration status: acceptable    Comments: --------------------            11/28/22               1550     --------------------   BP:       120/59     Pulse:      78       Resp:                Temp:                SpO2:      94%      --------------------

## 2022-11-29 ENCOUNTER — READMISSION MANAGEMENT (OUTPATIENT)
Dept: CALL CENTER | Facility: HOSPITAL | Age: 82
End: 2022-11-29

## 2022-11-29 LAB
ANION GAP SERPL CALCULATED.3IONS-SCNC: 15 MMOL/L (ref 5–15)
BUN SERPL-MCNC: 17 MG/DL (ref 8–23)
BUN/CREAT SERPL: 19.3 (ref 7–25)
CALCIUM SPEC-SCNC: 8.4 MG/DL (ref 8.6–10.5)
CHLORIDE SERPL-SCNC: 98 MMOL/L (ref 98–107)
CO2 SERPL-SCNC: 27 MMOL/L (ref 22–29)
CREAT SERPL-MCNC: 0.88 MG/DL (ref 0.57–1)
DEPRECATED RDW RBC AUTO: 58.6 FL (ref 37–54)
EGFRCR SERPLBLD CKD-EPI 2021: 64.9 ML/MIN/1.73
ERYTHROCYTE [DISTWIDTH] IN BLOOD BY AUTOMATED COUNT: 16.6 % (ref 12.3–15.4)
GLUCOSE BLDC GLUCOMTR-MCNC: 115 MG/DL (ref 70–130)
GLUCOSE BLDC GLUCOMTR-MCNC: 140 MG/DL (ref 70–130)
GLUCOSE BLDC GLUCOMTR-MCNC: 187 MG/DL (ref 70–130)
GLUCOSE SERPL-MCNC: 170 MG/DL (ref 65–99)
HCT VFR BLD AUTO: 26.8 % (ref 34–46.6)
HGB BLD-MCNC: 8.8 G/DL (ref 12–15.9)
LYMPHOCYTES # BLD MANUAL: 1.26 10*3/MM3 (ref 0.7–3.1)
LYMPHOCYTES NFR BLD MANUAL: 4 % (ref 5–12)
MCH RBC QN AUTO: 32.2 PG (ref 26.6–33)
MCHC RBC AUTO-ENTMCNC: 32.8 G/DL (ref 31.5–35.7)
MCV RBC AUTO: 98.2 FL (ref 79–97)
METAMYELOCYTES NFR BLD MANUAL: 1 % (ref 0–0)
MONOCYTES # BLD: 2.52 10*3/MM3 (ref 0.1–0.9)
NEUTROPHILS # BLD AUTO: 58.55 10*3/MM3 (ref 1.7–7)
NEUTROPHILS NFR BLD MANUAL: 92.9 % (ref 42.7–76)
PLAT MORPH BLD: NORMAL
PLATELET # BLD AUTO: 193 10*3/MM3 (ref 140–450)
PMV BLD AUTO: 11.8 FL (ref 6–12)
POTASSIUM SERPL-SCNC: 4 MMOL/L (ref 3.5–5.2)
QT INTERVAL: 417 MS
RBC # BLD AUTO: 2.73 10*6/MM3 (ref 3.77–5.28)
RBC MORPH BLD: NORMAL
SODIUM SERPL-SCNC: 140 MMOL/L (ref 136–145)
VARIANT LYMPHS NFR BLD MANUAL: 2 % (ref 19.6–45.3)
WBC MORPH BLD: NORMAL
WBC NRBC COR # BLD: 63.03 10*3/MM3 (ref 3.4–10.8)

## 2022-11-29 PROCEDURE — G0378 HOSPITAL OBSERVATION PER HR: HCPCS

## 2022-11-29 PROCEDURE — 85025 COMPLETE CBC W/AUTO DIFF WBC: CPT | Performed by: SURGERY

## 2022-11-29 PROCEDURE — 80048 BASIC METABOLIC PNL TOTAL CA: CPT | Performed by: SURGERY

## 2022-11-29 PROCEDURE — 82962 GLUCOSE BLOOD TEST: CPT

## 2022-11-29 PROCEDURE — 93010 ELECTROCARDIOGRAM REPORT: CPT | Performed by: INTERNAL MEDICINE

## 2022-11-29 PROCEDURE — 93005 ELECTROCARDIOGRAM TRACING: CPT | Performed by: NURSE PRACTITIONER

## 2022-11-29 PROCEDURE — 25010000002 ENOXAPARIN PER 10 MG: Performed by: SURGERY

## 2022-11-29 PROCEDURE — 63710000001 INSULIN LISPRO (HUMAN) PER 5 UNITS: Performed by: NURSE PRACTITIONER

## 2022-11-29 PROCEDURE — 25010000002 MORPHINE PER 10 MG: Performed by: SURGERY

## 2022-11-29 PROCEDURE — 99024 POSTOP FOLLOW-UP VISIT: CPT | Performed by: SURGERY

## 2022-11-29 PROCEDURE — 25010000002 PIPERACILLIN SOD-TAZOBACTAM PER 1 G: Performed by: SURGERY

## 2022-11-29 PROCEDURE — 25010000002 ONDANSETRON PER 1 MG: Performed by: NURSE PRACTITIONER

## 2022-11-29 PROCEDURE — 87102 FUNGUS ISOLATION CULTURE: CPT | Performed by: SURGERY

## 2022-11-29 PROCEDURE — 85007 BL SMEAR W/DIFF WBC COUNT: CPT | Performed by: SURGERY

## 2022-11-29 RX ORDER — CLOPIDOGREL BISULFATE 75 MG/1
75 TABLET ORAL DAILY
Status: DISCONTINUED | OUTPATIENT
Start: 2022-11-29 | End: 2022-12-01 | Stop reason: HOSPADM

## 2022-11-29 RX ADMIN — TAZOBACTAM SODIUM AND PIPERACILLIN SODIUM 3.38 G: 375; 3 INJECTION, SOLUTION INTRAVENOUS at 06:03

## 2022-11-29 RX ADMIN — METOPROLOL SUCCINATE 25 MG: 25 TABLET, EXTENDED RELEASE ORAL at 09:51

## 2022-11-29 RX ADMIN — PANTOPRAZOLE SODIUM 40 MG: 40 TABLET, DELAYED RELEASE ORAL at 09:46

## 2022-11-29 RX ADMIN — ATORVASTATIN CALCIUM 80 MG: 20 TABLET, FILM COATED ORAL at 22:16

## 2022-11-29 RX ADMIN — METOPROLOL SUCCINATE 25 MG: 25 TABLET, EXTENDED RELEASE ORAL at 22:24

## 2022-11-29 RX ADMIN — TAZOBACTAM SODIUM AND PIPERACILLIN SODIUM 3.38 G: 375; 3 INJECTION, SOLUTION INTRAVENOUS at 22:16

## 2022-11-29 RX ADMIN — CLOPIDOGREL 75 MG: 75 TABLET, FILM COATED ORAL at 16:35

## 2022-11-29 RX ADMIN — TAZOBACTAM SODIUM AND PIPERACILLIN SODIUM 3.38 G: 375; 3 INJECTION, SOLUTION INTRAVENOUS at 13:25

## 2022-11-29 RX ADMIN — ONDANSETRON 4 MG: 2 INJECTION INTRAMUSCULAR; INTRAVENOUS at 00:03

## 2022-11-29 RX ADMIN — ENOXAPARIN SODIUM 40 MG: 100 INJECTION SUBCUTANEOUS at 09:54

## 2022-11-29 RX ADMIN — SERTRALINE HYDROCHLORIDE 50 MG: 50 TABLET, FILM COATED ORAL at 12:35

## 2022-11-29 RX ADMIN — INSULIN LISPRO 2 UNITS: 100 INJECTION, SOLUTION INTRAVENOUS; SUBCUTANEOUS at 06:49

## 2022-11-29 RX ADMIN — MORPHINE SULFATE 2 MG: 2 INJECTION, SOLUTION INTRAMUSCULAR; INTRAVENOUS at 03:14

## 2022-11-29 NOTE — OUTREACH NOTE
Medical Week 1 Survey    Flowsheet Row Responses   [facility] patient discharged from? [place]   Does the patient have one of the following disease processes/diagnoses(primary or secondary)? Other   Week 1 attempt successful? No   Unsuccessful attempts Attempt 1   Revoke Readmitted          JAJA TREJO - Registered Nurse

## 2022-11-30 LAB
ANION GAP SERPL CALCULATED.3IONS-SCNC: 17.1 MMOL/L (ref 5–15)
BUN SERPL-MCNC: 29 MG/DL (ref 8–23)
BUN/CREAT SERPL: 19.1 (ref 7–25)
CALCIUM SPEC-SCNC: 8.5 MG/DL (ref 8.6–10.5)
CHLORIDE SERPL-SCNC: 95 MMOL/L (ref 98–107)
CO2 SERPL-SCNC: 25.9 MMOL/L (ref 22–29)
CREAT SERPL-MCNC: 1.52 MG/DL (ref 0.57–1)
DEPRECATED RDW RBC AUTO: 59.7 FL (ref 37–54)
EGFRCR SERPLBLD CKD-EPI 2021: 33.7 ML/MIN/1.73
ERYTHROCYTE [DISTWIDTH] IN BLOOD BY AUTOMATED COUNT: 16.7 % (ref 12.3–15.4)
GLUCOSE BLDC GLUCOMTR-MCNC: 102 MG/DL (ref 70–130)
GLUCOSE BLDC GLUCOMTR-MCNC: 118 MG/DL (ref 70–130)
GLUCOSE BLDC GLUCOMTR-MCNC: 99 MG/DL (ref 70–130)
GLUCOSE SERPL-MCNC: 104 MG/DL (ref 65–99)
HCT VFR BLD AUTO: 24.3 % (ref 34–46.6)
HGB BLD-MCNC: 8.1 G/DL (ref 12–15.9)
MCH RBC QN AUTO: 33.2 PG (ref 26.6–33)
MCHC RBC AUTO-ENTMCNC: 33.3 G/DL (ref 31.5–35.7)
MCV RBC AUTO: 99.6 FL (ref 79–97)
PLATELET # BLD AUTO: 204 10*3/MM3 (ref 140–450)
PMV BLD AUTO: 12.6 FL (ref 6–12)
POTASSIUM SERPL-SCNC: 3.6 MMOL/L (ref 3.5–5.2)
RBC # BLD AUTO: 2.44 10*6/MM3 (ref 3.77–5.28)
SODIUM SERPL-SCNC: 138 MMOL/L (ref 136–145)
WBC NRBC COR # BLD: 47.03 10*3/MM3 (ref 3.4–10.8)

## 2022-11-30 PROCEDURE — 25010000002 PIPERACILLIN SOD-TAZOBACTAM PER 1 G: Performed by: SURGERY

## 2022-11-30 PROCEDURE — 25010000002 ENOXAPARIN PER 10 MG: Performed by: SURGERY

## 2022-11-30 PROCEDURE — 85027 COMPLETE CBC AUTOMATED: CPT | Performed by: SURGERY

## 2022-11-30 PROCEDURE — 99024 POSTOP FOLLOW-UP VISIT: CPT | Performed by: SURGERY

## 2022-11-30 PROCEDURE — 82962 GLUCOSE BLOOD TEST: CPT

## 2022-11-30 PROCEDURE — G0378 HOSPITAL OBSERVATION PER HR: HCPCS

## 2022-11-30 PROCEDURE — 80048 BASIC METABOLIC PNL TOTAL CA: CPT | Performed by: SURGERY

## 2022-11-30 RX ORDER — SODIUM CHLORIDE 9 MG/ML
75 INJECTION, SOLUTION INTRAVENOUS CONTINUOUS
Status: DISCONTINUED | OUTPATIENT
Start: 2022-11-30 | End: 2022-12-01

## 2022-11-30 RX ADMIN — ENOXAPARIN SODIUM 40 MG: 100 INJECTION SUBCUTANEOUS at 08:27

## 2022-11-30 RX ADMIN — PANTOPRAZOLE SODIUM 40 MG: 40 TABLET, DELAYED RELEASE ORAL at 08:27

## 2022-11-30 RX ADMIN — TAZOBACTAM SODIUM AND PIPERACILLIN SODIUM 3.38 G: 375; 3 INJECTION, SOLUTION INTRAVENOUS at 05:19

## 2022-11-30 RX ADMIN — ATORVASTATIN CALCIUM 80 MG: 20 TABLET, FILM COATED ORAL at 21:23

## 2022-11-30 RX ADMIN — SERTRALINE HYDROCHLORIDE 50 MG: 50 TABLET, FILM COATED ORAL at 08:27

## 2022-11-30 RX ADMIN — METOPROLOL SUCCINATE 25 MG: 25 TABLET, EXTENDED RELEASE ORAL at 21:23

## 2022-11-30 RX ADMIN — SODIUM CHLORIDE 75 ML/HR: 9 INJECTION, SOLUTION INTRAVENOUS at 17:51

## 2022-11-30 RX ADMIN — CLOPIDOGREL 75 MG: 75 TABLET, FILM COATED ORAL at 08:27

## 2022-11-30 RX ADMIN — TAZOBACTAM SODIUM AND PIPERACILLIN SODIUM 3.38 G: 375; 3 INJECTION, SOLUTION INTRAVENOUS at 21:24

## 2022-11-30 RX ADMIN — TAZOBACTAM SODIUM AND PIPERACILLIN SODIUM 3.38 G: 375; 3 INJECTION, SOLUTION INTRAVENOUS at 13:38

## 2022-11-30 RX ADMIN — METOPROLOL SUCCINATE 25 MG: 25 TABLET, EXTENDED RELEASE ORAL at 08:27

## 2022-12-01 ENCOUNTER — READMISSION MANAGEMENT (OUTPATIENT)
Dept: CALL CENTER | Facility: HOSPITAL | Age: 82
End: 2022-12-01

## 2022-12-01 VITALS
BODY MASS INDEX: 29.14 KG/M2 | SYSTOLIC BLOOD PRESSURE: 108 MMHG | WEIGHT: 154.32 LBS | OXYGEN SATURATION: 92 % | DIASTOLIC BLOOD PRESSURE: 60 MMHG | RESPIRATION RATE: 18 BRPM | HEIGHT: 61 IN | TEMPERATURE: 97 F | HEART RATE: 83 BPM

## 2022-12-01 LAB
ANION GAP SERPL CALCULATED.3IONS-SCNC: 8.3 MMOL/L (ref 5–15)
BUN SERPL-MCNC: 21 MG/DL (ref 8–23)
BUN/CREAT SERPL: 20.6 (ref 7–25)
CALCIUM SPEC-SCNC: 7.9 MG/DL (ref 8.6–10.5)
CHLORIDE SERPL-SCNC: 101 MMOL/L (ref 98–107)
CO2 SERPL-SCNC: 29.7 MMOL/L (ref 22–29)
CREAT SERPL-MCNC: 1.02 MG/DL (ref 0.57–1)
DEPRECATED RDW RBC AUTO: 56.3 FL (ref 37–54)
EGFRCR SERPLBLD CKD-EPI 2021: 54.4 ML/MIN/1.73
ERYTHROCYTE [DISTWIDTH] IN BLOOD BY AUTOMATED COUNT: 16.3 % (ref 12.3–15.4)
GLUCOSE BLDC GLUCOMTR-MCNC: 139 MG/DL (ref 70–130)
GLUCOSE BLDC GLUCOMTR-MCNC: 96 MG/DL (ref 70–130)
GLUCOSE SERPL-MCNC: 87 MG/DL (ref 65–99)
HCT VFR BLD AUTO: 23.3 % (ref 34–46.6)
HGB BLD-MCNC: 8.1 G/DL (ref 12–15.9)
LAB AP CASE REPORT: NORMAL
MCH RBC QN AUTO: 33.3 PG (ref 26.6–33)
MCHC RBC AUTO-ENTMCNC: 34.8 G/DL (ref 31.5–35.7)
MCV RBC AUTO: 95.9 FL (ref 79–97)
PATH REPORT.FINAL DX SPEC: NORMAL
PATH REPORT.GROSS SPEC: NORMAL
PLATELET # BLD AUTO: 208 10*3/MM3 (ref 140–450)
PMV BLD AUTO: 11.9 FL (ref 6–12)
POTASSIUM SERPL-SCNC: 3.7 MMOL/L (ref 3.5–5.2)
RBC # BLD AUTO: 2.43 10*6/MM3 (ref 3.77–5.28)
SODIUM SERPL-SCNC: 139 MMOL/L (ref 136–145)
WBC NRBC COR # BLD: 31.81 10*3/MM3 (ref 3.4–10.8)

## 2022-12-01 PROCEDURE — 80048 BASIC METABOLIC PNL TOTAL CA: CPT | Performed by: HOSPITALIST

## 2022-12-01 PROCEDURE — G0378 HOSPITAL OBSERVATION PER HR: HCPCS

## 2022-12-01 PROCEDURE — 82962 GLUCOSE BLOOD TEST: CPT

## 2022-12-01 PROCEDURE — 25010000002 ENOXAPARIN PER 10 MG: Performed by: SURGERY

## 2022-12-01 PROCEDURE — 85027 COMPLETE CBC AUTOMATED: CPT | Performed by: SURGERY

## 2022-12-01 PROCEDURE — 25010000002 PIPERACILLIN SOD-TAZOBACTAM PER 1 G: Performed by: SURGERY

## 2022-12-01 RX ORDER — HYDROCODONE BITARTRATE AND ACETAMINOPHEN 5; 325 MG/1; MG/1
TABLET ORAL
Qty: 10 TABLET | Refills: 0 | Status: SHIPPED | OUTPATIENT
Start: 2022-12-01 | End: 2022-12-20

## 2022-12-01 RX ADMIN — SERTRALINE HYDROCHLORIDE 50 MG: 50 TABLET, FILM COATED ORAL at 08:31

## 2022-12-01 RX ADMIN — SODIUM CHLORIDE 75 ML/HR: 9 INJECTION, SOLUTION INTRAVENOUS at 06:37

## 2022-12-01 RX ADMIN — METOPROLOL SUCCINATE 25 MG: 25 TABLET, EXTENDED RELEASE ORAL at 08:31

## 2022-12-01 RX ADMIN — CLOPIDOGREL 75 MG: 75 TABLET, FILM COATED ORAL at 08:31

## 2022-12-01 RX ADMIN — ENOXAPARIN SODIUM 40 MG: 100 INJECTION SUBCUTANEOUS at 08:31

## 2022-12-01 RX ADMIN — PANTOPRAZOLE SODIUM 40 MG: 40 TABLET, DELAYED RELEASE ORAL at 08:31

## 2022-12-01 RX ADMIN — TAZOBACTAM SODIUM AND PIPERACILLIN SODIUM 3.38 G: 375; 3 INJECTION, SOLUTION INTRAVENOUS at 06:01

## 2022-12-01 NOTE — PLAN OF CARE
Goal Outcome Evaluation:  Plan of Care Reviewed With: patient           Outcome Evaluation: VSS, no c/o pain or nausea, Lap site CDI with some bruising noted, EUGENE drain removed on days, site dressed with gauze and transparen dressing, some drainage noted, voiding freely, no BM this shift, will continue to monitor. labs to be done in morning

## 2022-12-01 NOTE — PROGRESS NOTES
[facility]     LOS: 0 days     Subjective:    CC:No chief complaint on file.    DIET:  Diet Order   Procedures   • Diet: Regular/House Diet; Texture: Regular Texture (IDDSI 7); Fluid Consistency: Thin (IDDSI 0)   abdominal pain is controlled  No n/v  Feeling good, no cp  Tolerating oral intake    Objective:    Vital Signs:  Temp:  [97.3 °F (36.3 °C)-98.7 °F (37.1 °C)] 97.4 °F (36.3 °C)  Heart Rate:  [62-77] 77  Resp:  [17-18] 17  BP: (107-116)/(52-69) 107/55    SpO2:  [93 %-96 %] 96 %  on  Flow (L/min):  [2] 2;   Device (Oxygen Therapy): nasal cannula  Body mass index is 29.16 kg/m².    Physical Exam  Constitutional:       Appearance: Normal appearance.   HENT:      Head: Normocephalic and atraumatic.   Cardiovascular:      Rate and Rhythm: Normal rate and regular rhythm.      Heart sounds: No murmur heard.    No friction rub.   Pulmonary:      Effort: Pulmonary effort is normal.      Breath sounds: Normal breath sounds.   Abdominal:      General: Bowel sounds are normal. There is no distension.      Palpations: Abdomen is soft.      Tenderness: There is no abdominal tenderness.   Skin:     General: Skin is warm and dry.   Neurological:      Mental Status: She is alert.   Psychiatric:         Mood and Affect: Mood normal.         Behavior: Behavior normal.         Results Review:    Glucose   Date Value Ref Range Status   12/01/2022 87 65 - 99 mg/dL Final   11/30/2022 104 (H) 65 - 99 mg/dL Final   11/29/2022 170 (H) 65 - 99 mg/dL Final     Results from last 7 days   Lab Units 12/01/22  0713   WBC 10*3/mm3 31.81*   HEMOGLOBIN g/dL 8.1*   HEMATOCRIT % 23.3*   PLATELETS 10*3/mm3 208     Results from last 7 days   Lab Units 12/01/22  0713   SODIUM mmol/L 139   POTASSIUM mmol/L 3.7   CHLORIDE mmol/L 101   CO2 mmol/L 29.7*   BUN mg/dL 21   CREATININE mg/dL 1.02*   CALCIUM mg/dL 7.9*   GLUCOSE mg/dL 87                 Cultures:  No results found for: BLOODCX, URINECX, WOUNDCX, MRSACX, RESPCX,  STOOLCX    I have reviewed daily medications and changes in CPOE    Scheduled meds  atorvastatin, 80 mg, Oral, Nightly  clopidogrel, 75 mg, Oral, Daily  enoxaparin, 40 mg, Subcutaneous, Daily  insulin lispro, 0-9 Units, Subcutaneous, TID AC  metoprolol succinate XL, 25 mg, Oral, BID  pantoprazole, 40 mg, Oral, Daily  piperacillin-tazobactam, 3.375 g, Intravenous, Q8H  sertraline, 50 mg, Oral, Daily           PRN meds  •  dextrose  •  dextrose  •  glucagon (human recombinant)  •  melatonin  •  Morphine **AND** naloxone  •  ondansetron **OR** ondansetron        Cholecystitis    CAD (coronary artery disease)    Paroxysmal atrial fibrillation (HCC)    Type 2 diabetes mellitus, without long-term current use of insulin (HCC)    Hypertension    Chronic diastolic (congestive) heart failure (HCC)        Assessment/Plan:    Cad with stent in feb- pre-op taking aspirin and eliquis  -started plavix   -start eliquis    Anemia- hb is 8.1, eliquis to start tonight if ok with surgery    Afib- sinus on ekg     chf- volume status is euvolemic    Mild ARF  -small amount of fluids given overnight, creatinine is better    Chronic hypoxemia- on 2 liters, continue on discharge    htn- bp is good    dm2- controlled  -a1c 6.7    Wbc- chronically elevated, and stable  -followed by the [facility]  -no fevers or chills, she is feeling great        Eduard Rees MD  12/01/22  08:45 EST

## 2022-12-01 NOTE — DISCHARGE SUMMARY
Discharge Summary    Date of admission: 11/28/2022    Date of discharge: 12/1/2022    Final diagnosis:    1.  Cholelithiasis with chronic cholecystitis  2.  Atrial fibrillation  3.  Coronary disease  4.  Chronic diastolic heart failure  5.  Hypertension  6.  Myeloproliferative disorder  7.  Diabetes mellitus    Procedures performed during admission:    1.  Laparoscopic cholecystectomy with intraoperative cholangiogram    Consulting physicians:    1.  Eduard Rees MD, hospitalist    Reason for admission:    The patient is a pleasant 82-year-old female with a known history of porcelain gallbladder who presented to the emergency room with right upper quadrant abdominal pain.  She was diagnosed with cholecystitis.  She was on Eliquis and Plavix and discharged to home after holding Plavix and continuing Eliquis until 2 days before surgery.  Once she was adequately recovered from the anticoagulation she presents for laparoscopic cholecystectomy with intraoperative cholangiogram.    Hospital course:    The patient underwent a laparoscopic cholecystectomy with intraoperative cholangiogram on 11/28/2022 and was admitted postoperatively.  She did well while in the hospital but had a decrease in her H/H.  She was followed for 3 days postoperatively to make sure her H/H was stable.  Once it was established that there was no bleeding the Eliquis was restarted and she was felt ready for discharge to home.    A consultation was requested from the hospitalist group to manage her medical problems.  These remained stable through her hospital stay.    Prescriptions:    She was given a prescription for hydrocodone.    Follow-up:    She will follow-up with me in 2 weeks.    Dani Grover Jr., M.D.

## 2022-12-01 NOTE — PROGRESS NOTES
Case Management Discharge Note      Final Note: Discharged home ([facility]). Cherry Bush, RN              Transportation Services  Private: Car    Final Discharge Disposition Code: 01 - home or self-care

## 2022-12-02 NOTE — OUTREACH NOTE
Prep Survey    Flowsheet Row Responses   [facility] facility patient discharged from? [place]   Is LACE score < 7 ? No   Emergency Room discharge w/ pulse ox? No   Eligibility Not Eligible   What are the reasons patient is not eligible? Other  [assisted living]   Does the patient have one of the following disease processes/diagnoses(primary or secondary)? General Surgery  [cholecystitis s/p lap branden]   Prep survey completed? Yes          JAJA TREJO - Registered Nurse

## 2022-12-06 ENCOUNTER — SPECIALTY PHARMACY (OUTPATIENT)
Dept: ONCOLOGY | Facility: CLINIC | Age: 82
End: 2022-12-06

## 2022-12-06 LAB — BACTERIA ISLT: NORMAL

## 2022-12-06 NOTE — PROGRESS NOTES
Specialty Pharmacy Refill Coordination Note     Catherine is a 82 y.o. female contacted today regarding refills of Jakafi specialty medication(s).    Reviewed and verified with patient:       Specialty medication(s) and dose(s) confirmed: yes  Jakafi 15 mg twice per day    Refill Questions    Flowsheet Row Most Recent Value   Changes to allergies? No   Changes to medications? No   New conditions since last clinic visit No  [Recently admitted to the hospital]   Unplanned office visit, urgent care, ED, or hospital admission in the last 4 weeks  Yes  [Recently in the hospital-rec back home 12/1]   How does patient/caregiver feel medication is working? Good   Financial problems or insurance changes  No   Since the previous refill, were any specialty medication doses or scheduled injections missed or delayed?  No   Does this patient require a clinical escalation to a pharmacist? No          Delivery Questions    Flowsheet Row Most Recent Value   Delivery method Other (Comment)  [[employer] to pts home on 12/7-$0 copay-Address Confirmed]   Delivery address correct? Yes   Delivery phone number [phone]   Preferred delivery time? AM   Number of medications in delivery 1   Medication being filled and delivered Jakafi   Doses left of specialty medications Ila jacinto pt has a couple days   Is there any medication that is due not being filled? No   Supplies needed? No supplies needed   Cooler needed? No   Do any medications need mixed or dated? No   Copay form of payment Payment plan already set up   Additional comments $0 copay-Insurance pays 100%   Questions or concerns for the pharmacist? No   Explain any questions or concerns for the pharmacist N/A   Are any medications first time fills? No        Jakafi delivery coordinated with Ila, pts daughter, for 12/7/22 to the home address via [employer]. $0 copay with insurance. Her last delivery was on 10/28/22 but she was recently in the hospital. No questions or concerns  to report to MTM Team today.     Follow-up: 21 day(s)     Isi Mao  Specialty Pharmacy Technician

## 2022-12-14 ENCOUNTER — TELEPHONE (OUTPATIENT)
Dept: ONCOLOGY | Facility: CLINIC | Age: 82
End: 2022-12-14

## 2022-12-14 NOTE — TELEPHONE ENCOUNTER
Caller: Karen Gutierrez    Relationship to patient: Emergency Contact    Best call back number: [phone]    Type of visit: LAB, SPECIALTY PHARAMCY & F/U 1    Requested date: CALL TO R/S     If rescheduling, when is the original appointment: 11/28/2022

## 2022-12-16 NOTE — PROGRESS NOTES
"[facility] CBC GROUP OUTPATIENT FOLLOW UP CLINIC VISIT    REASON FOR FOLLOW-UP:    Myelofibrosis, on Jakafi    HISTORY OF PRESENT ILLNESS:  Catherine Boyer is a 82 y.o. female with the above-mentioned she returns today for follow-up and lab review as well as hospital return.    She had a cholecystectomy by Dr. Dani Grover Jr. on 11/28/2022.    She states that she feels well.  She has recovered nicely from her cholecystectomy.  No fevers or chills.  No abdominal pain.  Energy level is reasonable.      REVIEW OF SYSTEMS:  As per HPI    PHYSICAL EXAMINATION:    Vitals:    12/19/22 1620   BP: 129/73   Pulse: 101   Resp: 18   Temp: 97.8 °F (36.6 °C)   TempSrc: Temporal   SpO2: 93%   Weight: 69.5 kg (153 lb 3.2 oz)   Height: 154.9 cm (60.98\")   PainSc: 0-No pain      General:  No acute distress, awake, alert and oriented.  Ambulatory with a cane.  Skin:  Warm and dry, no visible rash  HEENT:  Normocephalic/atraumatic.  Wearing a face mask.  Chest:  Normal respiratory effort  Extremities:  No visible clubbing, cyanosis, or edema  Neuro/psych:  Grossly nonfocal.  Normal mood and affect.          DIAGNOSTIC DATA:  CBC & Differential (12/19/2022 15:32)      IMAGING:  None reviewed    ASSESSMENT:  This is a 82 y.o. female with:    *JAK2 positive myeloproliferative disorder, perhaps primary myelofibrosis  · She has been seen by Dr. Gerard Foreman with [facility] specialists and cancer and blood disorders in [place].      · She was seen there initially on 6/4/2021 with leukocytosis with a white blood cell count of 104,000.  Hemoglobin was normal at 11.6 and platelets were normal at 299,000.  PCR for BCR/ABL and FISH for BCR/ABL were negative.  A bone marrow aspiration was attempted on 6/9/2021 which was unsuccessful.  She then went to [place] for the summer.  She was admitted to the [facility] between 6/29/2021 and 7/9/2021.  She had a bone marrow aspiration and biopsy performed there on 6/30/2021 showing a " hypercellular marrow at greater than 95% with 1% blasts.  Focal 1+ reticulin fibrosis was noted.  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  PCR for BCR/ABL was negative.  JAK2 V617F PCR was positive.  Cytogenetics were normal.  Next generation myeloid disorder profiling of the bone marrow aspirate from 6/30/2021 showed TET2 L1690Q and Z7560Diw*47 abnormalities and JAK2 V617F.  Therefore she was suspected to have early primary myelofibrosis and she was started on hydroxyurea 1000 mg daily.  Subsequently, hydroxyurea was held.  Blood counts had improved.  She was admitted to the hospital from 2/9 through 2/15/2022 for symptomatic anemia and chest pain.  Her hemoglobin was 5.5.  The white blood cell count was 7.6.  Platelets were 56,000.  Fecal occult blood testing was negative.  She received 3 units of packed red blood cells.  No endoscopy was performed.  A left heart catheterization was performed with a drug-eluting stent placed to the second diagonal on 2/11/2022 and she was discharged from that hospitalization on aspirin 81 mg, Plavix 75 mg, and Eliquis 5 mg twice daily.  Blood counts improved by 3/7/2022 and her white blood cell count was 7.2 with a hemoglobin 11.4 and platelets 295,000.  She did have a bone marrow aspiration and biopsy on 3/7/2022 showing chronic myeloproliferative neoplasm with potentially post ET fibrosis or consideration of primary myelofibrosis.  There was no evidence for myelodysplasia.  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  She also has atrial fibrillation and is anticoagulated with Eliquis.    · Hydroxyurea was discontinued and she was started on ruxolitinib 15 mg twice daily at her visit on 3/14/2022 with Dr. Gerard Foreman.  · She had CT imaging of the chest abdomen pelvis on 3/19/2022 that did not demonstrate any splenomegaly.  Calcified mediastinal lymphadenopathy was noted.   Borderline pretracheal and right hilar lymphadenopathy noted.  Small bilateral pleural effusions with bibasilar atelectasis noted.  · She has moved from [place] to [place] and is seen initially in the office on 5/15/2022.  She tolerates ruxolitinib well.  White blood cell count has decreased from 5.9 from 19.6.  Platelets have normalized at 170,000, down from 464,000.  The hemoglobin has also decreased at 8.6 with an MCV of 98.9.  · 5/26/2022: White blood cell count mildly elevated at 12.4 with a normal platelet count at 216,000.  She tolerates Jakafi very well.   · 6/30/2022: White blood cell count a little higher.  Continue monitoring.  · 9/13/2022: White blood cell count higher at 32,000  · Flow cytometry 9/13/2022 with 0.1% myeloblasts with a typical (normal) phenotype.  Nonspecific monocyte population.  · Bone marrow aspiration and biopsy on 10/7/2022 with hypercellular marrow at 95% with involvement by chronic myeloproliferative neoplasm, less than 5% blasts.  Mild reticulin fibrosis.  Decreased iron stores.  Consideration of CMML.  Flow cytometry showed a dim CD56 positive aberrant monocyte population at 8.2 percent of events.  Cytogenetics pending.  NGS pending.  · 10/26/2022: White blood cell count improved at 21,000.  • Patient admitted 10/29/2022 with COVID-19 infection.  Jakafi held.  • Resumed Jakafi 15 mg twice daily on 11/1/2022.  • WBC stable at 51,000        *Microcytic anemia  • HGB 9.3, improving from 8.1     *History of marantic endocarditis and atrial fibrillation  · Anticoagulated with Eliquis 5 mg twice daily     *Recent admission with heart failure, positive stress test and LEFTY to a large diagonal branch for in sent stenosis.    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  She does not wish to pursue further evaluation.   She did have some brief epigastric/right upper quadrant pain which has now resolved.  • Cholecystectomy by Dr. Dani Grover Jr. on 11/28/2022     *Splenic lesion on CT  • CT abdomen and pelvis 10/29/2022 with 2 cm low-attenuation lesion in the spleen, indeterminant  • Consider outpatient MRI for further evaluation    *COVID-19 infection  • Patient presented to ER on 10/29/2022 with progressive generalized weakness x10 days  • Patient tested positive for COVID-19 on admission 10/29/2022  • Elevated lactate 3.0  • Chest x-ray 10/29/2022 with no evidence of consolidation  • Patient was started on dexamethasone and remdesivir  • Improved    PLAN:   1. Continue Jakafi 15 mg twice daily.  2. Continue Eliquis 5 mg twice daily and Plavix 75 mg daily.  3. Follow-up in about 4 weeks on a Friday at [place] with labs as noted below    High risk medication requiring intensive monitoring    Orders Placed This Encounter   Procedures   • Flow Cytometry, Blood     Standing Status:   Future     Standing Expiration Date:   12/19/2023     Order Specific Question:   Release to patient     Answer:   Routine Release   • CBC & Differential     Standing Status:   Future     Standing Expiration Date:   12/19/2023     Order Specific Question:   Manual Differential     Answer:   No       Darrell Reinoso MD  12/19/2022

## 2022-12-19 ENCOUNTER — SPECIALTY PHARMACY (OUTPATIENT)
Dept: ONCOLOGY | Facility: HOSPITAL | Age: 82
End: 2022-12-19
Payer: MEDICARE

## 2022-12-19 ENCOUNTER — OFFICE VISIT (OUTPATIENT)
Dept: ONCOLOGY | Facility: CLINIC | Age: 82
End: 2022-12-19

## 2022-12-19 ENCOUNTER — LAB (OUTPATIENT)
Dept: LAB | Facility: HOSPITAL | Age: 82
End: 2022-12-19
Payer: MEDICARE

## 2022-12-19 VITALS — TEMPERATURE: 97.8 F | WEIGHT: 153.2 LBS | BODY MASS INDEX: 28.96 KG/M2

## 2022-12-19 VITALS
HEIGHT: 61 IN | OXYGEN SATURATION: 93 % | RESPIRATION RATE: 18 BRPM | DIASTOLIC BLOOD PRESSURE: 73 MMHG | TEMPERATURE: 97.8 F | BODY MASS INDEX: 28.92 KG/M2 | HEART RATE: 101 BPM | SYSTOLIC BLOOD PRESSURE: 129 MMHG | WEIGHT: 153.2 LBS

## 2022-12-19 DIAGNOSIS — D53.9 MACROCYTIC ANEMIA: ICD-10-CM

## 2022-12-19 DIAGNOSIS — D47.1 MYELOPROLIFERATIVE DISORDER: Primary | ICD-10-CM

## 2022-12-19 DIAGNOSIS — D50.9 MICROCYTIC ANEMIA: ICD-10-CM

## 2022-12-19 DIAGNOSIS — D47.1 MYELOPROLIFERATIVE DISORDER: ICD-10-CM

## 2022-12-19 LAB
ALBUMIN SERPL-MCNC: 4 G/DL (ref 3.5–5.2)
ALBUMIN/GLOB SERPL: 1.3 G/DL (ref 1.1–2.4)
ALP SERPL-CCNC: 200 U/L (ref 38–116)
ALT SERPL W P-5'-P-CCNC: 21 U/L (ref 0–33)
ANION GAP SERPL CALCULATED.3IONS-SCNC: 12 MMOL/L (ref 5–15)
AST SERPL-CCNC: 26 U/L (ref 0–32)
BASOPHILS # BLD AUTO: 0.54 10*3/MM3 (ref 0–0.2)
BASOPHILS NFR BLD AUTO: 1 % (ref 0–1.5)
BILIRUB SERPL-MCNC: 0.6 MG/DL (ref 0.2–1.2)
BUN SERPL-MCNC: 12 MG/DL (ref 6–20)
BUN/CREAT SERPL: 13 (ref 7.3–30)
CALCIUM SPEC-SCNC: 9.9 MG/DL (ref 8.5–10.2)
CHLORIDE SERPL-SCNC: 100 MMOL/L (ref 98–107)
CO2 SERPL-SCNC: 29 MMOL/L (ref 22–29)
CREAT SERPL-MCNC: 0.92 MG/DL (ref 0.6–1.1)
DEPRECATED RDW RBC AUTO: 74.9 FL (ref 37–54)
EGFRCR SERPLBLD CKD-EPI 2021: 61.5 ML/MIN/1.73
EOSINOPHIL # BLD AUTO: 0.51 10*3/MM3 (ref 0–0.4)
EOSINOPHIL NFR BLD AUTO: 1 % (ref 0.3–6.2)
ERYTHROCYTE [DISTWIDTH] IN BLOOD BY AUTOMATED COUNT: 19.2 % (ref 12.3–15.4)
GLOBULIN UR ELPH-MCNC: 3.2 GM/DL (ref 1.8–3.5)
GLUCOSE SERPL-MCNC: 136 MG/DL (ref 74–124)
HCT VFR BLD AUTO: 30.8 % (ref 34–46.6)
HGB BLD-MCNC: 9.3 G/DL (ref 12–15.9)
IMM GRANULOCYTES # BLD AUTO: 5.92 10*3/MM3 (ref 0–0.05)
IMM GRANULOCYTES NFR BLD AUTO: 11.5 % (ref 0–0.5)
LYMPHOCYTES # BLD AUTO: 4.91 10*3/MM3 (ref 0.7–3.1)
LYMPHOCYTES NFR BLD AUTO: 9.5 % (ref 19.6–45.3)
MCH RBC QN AUTO: 32.6 PG (ref 26.6–33)
MCHC RBC AUTO-ENTMCNC: 30.2 G/DL (ref 31.5–35.7)
MCV RBC AUTO: 108.1 FL (ref 79–97)
MONOCYTES # BLD AUTO: 4.66 10*3/MM3 (ref 0.1–0.9)
MONOCYTES NFR BLD AUTO: 9 % (ref 5–12)
NEUTROPHILS NFR BLD AUTO: 35.07 10*3/MM3 (ref 1.7–7)
NEUTROPHILS NFR BLD AUTO: 68 % (ref 42.7–76)
NRBC BLD AUTO-RTO: 0.1 /100 WBC (ref 0–0.2)
PLATELET # BLD AUTO: 398 10*3/MM3 (ref 140–450)
PMV BLD AUTO: 11.2 FL (ref 6–12)
POTASSIUM SERPL-SCNC: 5.2 MMOL/L (ref 3.5–4.7)
PROT SERPL-MCNC: 7.2 G/DL (ref 6.3–8)
RBC # BLD AUTO: 2.85 10*6/MM3 (ref 3.77–5.28)
SODIUM SERPL-SCNC: 141 MMOL/L (ref 134–145)
WBC NRBC COR # BLD: 51.61 10*3/MM3 (ref 3.4–10.8)

## 2022-12-19 PROCEDURE — 85025 COMPLETE CBC W/AUTO DIFF WBC: CPT

## 2022-12-19 PROCEDURE — 36415 COLL VENOUS BLD VENIPUNCTURE: CPT

## 2022-12-19 PROCEDURE — 99214 OFFICE O/P EST MOD 30 MIN: CPT | Performed by: INTERNAL MEDICINE

## 2022-12-19 PROCEDURE — 80053 COMPREHEN METABOLIC PANEL: CPT

## 2022-12-20 ENCOUNTER — OFFICE VISIT (OUTPATIENT)
Dept: SURGERY | Facility: CLINIC | Age: 82
End: 2022-12-20

## 2022-12-20 VITALS — BODY MASS INDEX: 28.74 KG/M2 | WEIGHT: 152.2 LBS | HEIGHT: 61 IN

## 2022-12-20 DIAGNOSIS — Z48.89 POSTOPERATIVE VISIT: Primary | ICD-10-CM

## 2022-12-20 PROCEDURE — 99024 POSTOP FOLLOW-UP VISIT: CPT | Performed by: SURGERY

## 2022-12-20 NOTE — PROGRESS NOTES
Subjective   Catherine Boyer is a 82 y.o. female who returns to the office after undergoing a laparoscopic cholecystectomy with intraoperative cholangiogram on 11/28/2022.     History of Present Illness     The patient is recovering well with no significant postop symptoms.  She is having no abdominal pain.  She has a good appetite and normal bowel function.  Her energy level is good.      Review of Systems   Constitutional: Negative for activity change, appetite change, fatigue and fever.   Respiratory: Negative for chest tightness and shortness of breath.    Cardiovascular: Negative for chest pain and palpitations.   Gastrointestinal: Negative for abdominal pain, constipation, diarrhea and nausea.   Skin: Negative for rash and wound.   Psychiatric/Behavioral: Negative for agitation and confusion.       Objective   Physical Exam  Constitutional:       General: She is not in acute distress.     Appearance: Normal appearance. She is not ill-appearing or toxic-appearing.   Pulmonary:      Effort: Pulmonary effort is normal. No respiratory distress.   Abdominal:      Palpations: Abdomen is soft.      Tenderness: There is no abdominal tenderness.   Skin:     Comments: Incision: intact with no evidence of infection.   Neurological:      Mental Status: She is alert.   Psychiatric:         Behavior: Behavior normal.         Assessment & Plan     1.  Postoperative visit: The patient is recovering well from her laparoscopic cholecystectomy with intraoperative cholangiogram.  At this point she has no limitations.  She will follow-up on an as-needed basis.

## 2022-12-23 ENCOUNTER — TELEPHONE (OUTPATIENT)
Dept: CARDIOLOGY | Facility: CLINIC | Age: 82
End: 2022-12-23

## 2022-12-23 RX ORDER — ATORVASTATIN CALCIUM 80 MG/1
80 TABLET, FILM COATED ORAL NIGHTLY
Qty: 90 TABLET | Refills: 0 | Status: SHIPPED | OUTPATIENT
Start: 2022-12-23 | End: 2023-02-24 | Stop reason: SDUPTHER

## 2022-12-23 RX ORDER — CLOPIDOGREL BISULFATE 75 MG/1
75 TABLET ORAL DAILY
Qty: 30 TABLET | Refills: 0 | Status: SHIPPED | OUTPATIENT
Start: 2022-12-23 | End: 2023-02-14

## 2022-12-23 NOTE — TELEPHONE ENCOUNTER
Please arrange a follow-up appointment with Dr. Barriga.  She was supposed to be seen around this time.  Thank you

## 2023-01-01 ENCOUNTER — APPOINTMENT (OUTPATIENT)
Dept: CT IMAGING | Facility: HOSPITAL | Age: 83
DRG: 871 | End: 2023-01-01
Payer: MEDICARE

## 2023-01-01 ENCOUNTER — HOSPITAL ENCOUNTER (INPATIENT)
Facility: HOSPITAL | Age: 83
LOS: 5 days | DRG: 951 | End: 2023-12-10
Attending: STUDENT IN AN ORGANIZED HEALTH CARE EDUCATION/TRAINING PROGRAM | Admitting: INTERNAL MEDICINE
Payer: COMMERCIAL

## 2023-01-01 ENCOUNTER — APPOINTMENT (OUTPATIENT)
Dept: CARDIOLOGY | Facility: HOSPITAL | Age: 83
DRG: 871 | End: 2023-01-01
Payer: MEDICARE

## 2023-01-01 ENCOUNTER — HOSPITAL ENCOUNTER (INPATIENT)
Facility: HOSPITAL | Age: 83
LOS: 11 days | Discharge: HOSPICE | DRG: 871 | End: 2023-12-05
Attending: STUDENT IN AN ORGANIZED HEALTH CARE EDUCATION/TRAINING PROGRAM | Admitting: INTERNAL MEDICINE
Payer: MEDICARE

## 2023-01-01 ENCOUNTER — APPOINTMENT (OUTPATIENT)
Dept: GENERAL RADIOLOGY | Facility: HOSPITAL | Age: 83
DRG: 871 | End: 2023-01-01
Payer: MEDICARE

## 2023-01-01 VITALS
RESPIRATION RATE: 20 BRPM | WEIGHT: 139 LBS | SYSTOLIC BLOOD PRESSURE: 107 MMHG | OXYGEN SATURATION: 93 % | DIASTOLIC BLOOD PRESSURE: 60 MMHG | HEIGHT: 62 IN | HEART RATE: 103 BPM | BODY MASS INDEX: 25.58 KG/M2 | TEMPERATURE: 98.3 F

## 2023-01-01 VITALS
BODY MASS INDEX: 25.58 KG/M2 | DIASTOLIC BLOOD PRESSURE: 59 MMHG | WEIGHT: 139 LBS | TEMPERATURE: 99.1 F | HEIGHT: 62 IN | OXYGEN SATURATION: 64 % | SYSTOLIC BLOOD PRESSURE: 108 MMHG

## 2023-01-01 DIAGNOSIS — A41.9 SEPTIC SHOCK: ICD-10-CM

## 2023-01-01 DIAGNOSIS — R09.02 HYPOXIA: ICD-10-CM

## 2023-01-01 DIAGNOSIS — J18.9 COMMUNITY ACQUIRED PNEUMONIA, UNSPECIFIED LATERALITY: Primary | ICD-10-CM

## 2023-01-01 DIAGNOSIS — R65.21 SEPTIC SHOCK: ICD-10-CM

## 2023-01-01 LAB
ABO GROUP BLD: NORMAL
ALBUMIN SERPL-MCNC: 3 G/DL (ref 3.5–5.2)
ALBUMIN SERPL-MCNC: 3.5 G/DL (ref 3.5–5.2)
ALBUMIN SERPL-MCNC: 3.7 G/DL (ref 3.5–5.2)
ALBUMIN SERPL-MCNC: 4 G/DL (ref 3.5–5.2)
ALBUMIN/GLOB SERPL: 1.5 G/DL
ALBUMIN/GLOB SERPL: 1.5 G/DL
ALBUMIN/GLOB SERPL: 1.7 G/DL
ALP SERPL-CCNC: 155 U/L (ref 39–117)
ALP SERPL-CCNC: 190 U/L (ref 39–117)
ALP SERPL-CCNC: 209 U/L (ref 39–117)
ALP SERPL-CCNC: 265 U/L (ref 39–117)
ALT SERPL W P-5'-P-CCNC: 12 U/L (ref 1–33)
ALT SERPL W P-5'-P-CCNC: 18 U/L (ref 1–33)
ALT SERPL W P-5'-P-CCNC: 24 U/L (ref 1–33)
ALT SERPL W P-5'-P-CCNC: 24 U/L (ref 1–33)
ANION GAP SERPL CALCULATED.3IONS-SCNC: 13.5 MMOL/L (ref 5–15)
ANION GAP SERPL CALCULATED.3IONS-SCNC: 14 MMOL/L (ref 5–15)
ANION GAP SERPL CALCULATED.3IONS-SCNC: 14.9 MMOL/L (ref 5–15)
ANION GAP SERPL CALCULATED.3IONS-SCNC: 15.8 MMOL/L (ref 5–15)
ANION GAP SERPL CALCULATED.3IONS-SCNC: 16 MMOL/L (ref 5–15)
ANION GAP SERPL CALCULATED.3IONS-SCNC: 16 MMOL/L (ref 5–15)
ANION GAP SERPL CALCULATED.3IONS-SCNC: 16.6 MMOL/L (ref 5–15)
ANION GAP SERPL CALCULATED.3IONS-SCNC: 18 MMOL/L (ref 5–15)
ANION GAP SERPL CALCULATED.3IONS-SCNC: 18.1 MMOL/L (ref 5–15)
ANION GAP SERPL CALCULATED.3IONS-SCNC: 19.4 MMOL/L (ref 5–15)
ANION GAP SERPL CALCULATED.3IONS-SCNC: 22.8 MMOL/L (ref 5–15)
ANISOCYTOSIS BLD QL: ABNORMAL
ANISOCYTOSIS BLD QL: ABNORMAL
AORTIC DIMENSIONLESS INDEX: 0.6 (DI)
ARTERIAL PATENCY WRIST A: POSITIVE
ASCENDING AORTA: 3.6 CM
AST SERPL-CCNC: 15 U/L (ref 1–32)
AST SERPL-CCNC: 15 U/L (ref 1–32)
AST SERPL-CCNC: 20 U/L (ref 1–32)
AST SERPL-CCNC: 28 U/L (ref 1–32)
ATMOSPHERIC PRESS: 752.5 MMHG
B PARAPERT DNA SPEC QL NAA+PROBE: NOT DETECTED
B PERT DNA SPEC QL NAA+PROBE: NOT DETECTED
BACTERIA BLD CULT: ABNORMAL
BACTERIA SPEC AEROBE CULT: ABNORMAL
BACTERIA SPEC AEROBE CULT: NORMAL
BASE EXCESS BLDA CALC-SCNC: 1.8 MMOL/L (ref 0–2)
BDY SITE: ABNORMAL
BH BB BLOOD EXPIRATION DATE: NORMAL
BH BB BLOOD TYPE BARCODE: 5100
BH BB DISPENSE STATUS: NORMAL
BH BB PRODUCT CODE: NORMAL
BH BB UNIT NUMBER: NORMAL
BH CV ECHO MEAS - ACS: 1.2 CM
BH CV ECHO MEAS - AO MAX PG: 24.5 MMHG
BH CV ECHO MEAS - AO MEAN PG: 12.2 MMHG
BH CV ECHO MEAS - AO ROOT DIAM: 3.2 CM
BH CV ECHO MEAS - AO V2 MAX: 247.3 CM/SEC
BH CV ECHO MEAS - AO V2 VTI: 43.8 CM
BH CV ECHO MEAS - AVA(I,D): 1.57 CM2
BH CV ECHO MEAS - EDV(CUBED): 99.9 ML
BH CV ECHO MEAS - EDV(MOD-SP2): 35 ML
BH CV ECHO MEAS - EDV(MOD-SP4): 56 ML
BH CV ECHO MEAS - EF(MOD-BP): 55.3 %
BH CV ECHO MEAS - EF(MOD-SP2): 62.9 %
BH CV ECHO MEAS - EF(MOD-SP4): 50 %
BH CV ECHO MEAS - ESV(CUBED): 23.1 ML
BH CV ECHO MEAS - ESV(MOD-SP2): 13 ML
BH CV ECHO MEAS - ESV(MOD-SP4): 28 ML
BH CV ECHO MEAS - FS: 38.6 %
BH CV ECHO MEAS - IVS/LVPW: 1 CM
BH CV ECHO MEAS - IVSD: 0.99 CM
BH CV ECHO MEAS - LAT PEAK E' VEL: 7.6 CM/SEC
BH CV ECHO MEAS - LV MASS(C)D: 158.7 GRAMS
BH CV ECHO MEAS - LV MAX PG: 6.2 MMHG
BH CV ECHO MEAS - LV MEAN PG: 3.3 MMHG
BH CV ECHO MEAS - LV V1 MAX: 124.5 CM/SEC
BH CV ECHO MEAS - LV V1 VTI: 22.5 CM
BH CV ECHO MEAS - LVIDD: 4.6 CM
BH CV ECHO MEAS - LVIDS: 2.8 CM
BH CV ECHO MEAS - LVOT AREA: 3 CM2
BH CV ECHO MEAS - LVOT DIAM: 1.97 CM
BH CV ECHO MEAS - LVPWD: 0.99 CM
BH CV ECHO MEAS - MED PEAK E' VEL: 5.7 CM/SEC
BH CV ECHO MEAS - MV A MAX VEL: 115.7 CM/SEC
BH CV ECHO MEAS - MV DEC SLOPE: 779.4 CM/SEC2
BH CV ECHO MEAS - MV DEC TIME: 0.2 SEC
BH CV ECHO MEAS - MV E MAX VEL: 129 CM/SEC
BH CV ECHO MEAS - MV E/A: 1.12
BH CV ECHO MEAS - MV MAX PG: 9.4 MMHG
BH CV ECHO MEAS - MV MEAN PG: 4.7 MMHG
BH CV ECHO MEAS - MV P1/2T: 52.6 MSEC
BH CV ECHO MEAS - MV V2 VTI: 32.1 CM
BH CV ECHO MEAS - MVA(P1/2T): 4.2 CM2
BH CV ECHO MEAS - MVA(VTI): 2.14 CM2
BH CV ECHO MEAS - PA ACC TIME: 0.12 SEC
BH CV ECHO MEAS - PA V2 MAX: 109.7 CM/SEC
BH CV ECHO MEAS - QP/QS: 1.03
BH CV ECHO MEAS - RAP SYSTOLE: 3 MMHG
BH CV ECHO MEAS - RV MAX PG: 5.9 MMHG
BH CV ECHO MEAS - RV V1 MAX: 121.8 CM/SEC
BH CV ECHO MEAS - RV V1 VTI: 22.6 CM
BH CV ECHO MEAS - RVOT DIAM: 2 CM
BH CV ECHO MEAS - RVSP: 45 MMHG
BH CV ECHO MEAS - SV(LVOT): 68.7 ML
BH CV ECHO MEAS - SV(MOD-SP2): 22 ML
BH CV ECHO MEAS - SV(MOD-SP4): 28 ML
BH CV ECHO MEAS - SV(RVOT): 70.7 ML
BH CV ECHO MEAS - TAPSE (>1.6): 1.78 CM
BH CV ECHO MEAS - TR MAX PG: 42 MMHG
BH CV ECHO MEAS - TR MAX VEL: 324.1 CM/SEC
BH CV ECHO MEASUREMENTS AVERAGE E/E' RATIO: 19.4
BH CV XLRA - RV BASE: 3.1 CM
BH CV XLRA - RV LENGTH: 5.8 CM
BH CV XLRA - RV MID: 2.8 CM
BH CV XLRA - TDI S': 17.1 CM/SEC
BILIRUB CONJ SERPL-MCNC: 0.5 MG/DL (ref 0–0.3)
BILIRUB INDIRECT SERPL-MCNC: 0.4 MG/DL
BILIRUB SERPL-MCNC: 0.8 MG/DL (ref 0–1.2)
BILIRUB SERPL-MCNC: 0.9 MG/DL (ref 0–1.2)
BILIRUB SERPL-MCNC: 1 MG/DL (ref 0–1.2)
BILIRUB SERPL-MCNC: 1.1 MG/DL (ref 0–1.2)
BILIRUB UR QL STRIP: NEGATIVE
BLD GP AB SCN SERPL QL: NEGATIVE
BOTTLE TYPE: ABNORMAL
BUN SERPL-MCNC: 14 MG/DL (ref 8–23)
BUN SERPL-MCNC: 15 MG/DL (ref 8–23)
BUN SERPL-MCNC: 15 MG/DL (ref 8–23)
BUN SERPL-MCNC: 18 MG/DL (ref 8–23)
BUN SERPL-MCNC: 19 MG/DL (ref 8–23)
BUN SERPL-MCNC: 20 MG/DL (ref 8–23)
BUN SERPL-MCNC: 21 MG/DL (ref 8–23)
BUN SERPL-MCNC: 22 MG/DL (ref 8–23)
BUN SERPL-MCNC: 22 MG/DL (ref 8–23)
BUN SERPL-MCNC: 23 MG/DL (ref 8–23)
BUN SERPL-MCNC: 28 MG/DL (ref 8–23)
BUN/CREAT SERPL: 15.4 (ref 7–25)
BUN/CREAT SERPL: 18.1 (ref 7–25)
BUN/CREAT SERPL: 19.7 (ref 7–25)
BUN/CREAT SERPL: 20.9 (ref 7–25)
BUN/CREAT SERPL: 22.2 (ref 7–25)
BUN/CREAT SERPL: 22.5 (ref 7–25)
BUN/CREAT SERPL: 22.6 (ref 7–25)
BUN/CREAT SERPL: 25 (ref 7–25)
BUN/CREAT SERPL: 25.3 (ref 7–25)
BUN/CREAT SERPL: 25.6 (ref 7–25)
BUN/CREAT SERPL: 32.2 (ref 7–25)
C PNEUM DNA NPH QL NAA+NON-PROBE: NOT DETECTED
CALCIUM SPEC-SCNC: 7.6 MG/DL (ref 8.6–10.5)
CALCIUM SPEC-SCNC: 8 MG/DL (ref 8.6–10.5)
CALCIUM SPEC-SCNC: 8 MG/DL (ref 8.6–10.5)
CALCIUM SPEC-SCNC: 8.1 MG/DL (ref 8.6–10.5)
CALCIUM SPEC-SCNC: 8.3 MG/DL (ref 8.6–10.5)
CALCIUM SPEC-SCNC: 8.3 MG/DL (ref 8.6–10.5)
CALCIUM SPEC-SCNC: 8.4 MG/DL (ref 8.6–10.5)
CALCIUM SPEC-SCNC: 8.7 MG/DL (ref 8.6–10.5)
CALCIUM SPEC-SCNC: 9.2 MG/DL (ref 8.6–10.5)
CHLORIDE SERPL-SCNC: 88 MMOL/L (ref 98–107)
CHLORIDE SERPL-SCNC: 89 MMOL/L (ref 98–107)
CHLORIDE SERPL-SCNC: 93 MMOL/L (ref 98–107)
CHLORIDE SERPL-SCNC: 95 MMOL/L (ref 98–107)
CHLORIDE SERPL-SCNC: 95 MMOL/L (ref 98–107)
CHLORIDE SERPL-SCNC: 96 MMOL/L (ref 98–107)
CHLORIDE SERPL-SCNC: 96 MMOL/L (ref 98–107)
CHLORIDE SERPL-SCNC: 97 MMOL/L (ref 98–107)
CHLORIDE SERPL-SCNC: 97 MMOL/L (ref 98–107)
CLARITY UR: CLEAR
CO2 BLDA-SCNC: 26.7 MMOL/L (ref 23–27)
CO2 SERPL-SCNC: 22 MMOL/L (ref 22–29)
CO2 SERPL-SCNC: 24 MMOL/L (ref 22–29)
CO2 SERPL-SCNC: 24.9 MMOL/L (ref 22–29)
CO2 SERPL-SCNC: 26.2 MMOL/L (ref 22–29)
CO2 SERPL-SCNC: 26.5 MMOL/L (ref 22–29)
CO2 SERPL-SCNC: 27.1 MMOL/L (ref 22–29)
CO2 SERPL-SCNC: 27.2 MMOL/L (ref 22–29)
CO2 SERPL-SCNC: 27.4 MMOL/L (ref 22–29)
CO2 SERPL-SCNC: 29 MMOL/L (ref 22–29)
CO2 SERPL-SCNC: 29 MMOL/L (ref 22–29)
CO2 SERPL-SCNC: 29.6 MMOL/L (ref 22–29)
COLOR UR: YELLOW
CREAT SERPL-MCNC: 0.76 MG/DL (ref 0.57–1)
CREAT SERPL-MCNC: 0.8 MG/DL (ref 0.57–1)
CREAT SERPL-MCNC: 0.8 MG/DL (ref 0.57–1)
CREAT SERPL-MCNC: 0.83 MG/DL (ref 0.57–1)
CREAT SERPL-MCNC: 0.86 MG/DL (ref 0.57–1)
CREAT SERPL-MCNC: 0.87 MG/DL (ref 0.57–1)
CREAT SERPL-MCNC: 0.91 MG/DL (ref 0.57–1)
CREAT SERPL-MCNC: 0.93 MG/DL (ref 0.57–1)
CREAT SERPL-MCNC: 0.99 MG/DL (ref 0.57–1)
CROSSMATCH INTERPRETATION: NORMAL
D-LACTATE SERPL-SCNC: 4.2 MMOL/L (ref 0.5–2)
D-LACTATE SERPL-SCNC: 4.8 MMOL/L (ref 0.5–2)
D-LACTATE SERPL-SCNC: 5.2 MMOL/L (ref 0.5–2)
D-LACTATE SERPL-SCNC: 5.2 MMOL/L (ref 0.5–2)
D-LACTATE SERPL-SCNC: 5.6 MMOL/L (ref 0.5–2)
D-LACTATE SERPL-SCNC: 6 MMOL/L (ref 0.5–2)
DACRYOCYTES BLD QL SMEAR: ABNORMAL
DEPRECATED RDW RBC AUTO: 61.4 FL (ref 37–54)
DEPRECATED RDW RBC AUTO: 63.2 FL (ref 37–54)
DEPRECATED RDW RBC AUTO: 64.5 FL (ref 37–54)
DEPRECATED RDW RBC AUTO: 64.6 FL (ref 37–54)
DEPRECATED RDW RBC AUTO: 64.7 FL (ref 37–54)
DEPRECATED RDW RBC AUTO: 66.6 FL (ref 37–54)
DEPRECATED RDW RBC AUTO: 66.9 FL (ref 37–54)
DEPRECATED RDW RBC AUTO: 67.2 FL (ref 37–54)
DEPRECATED RDW RBC AUTO: 67.2 FL (ref 37–54)
DEPRECATED RDW RBC AUTO: 67.7 FL (ref 37–54)
DEPRECATED RDW RBC AUTO: 70.4 FL (ref 37–54)
DEVICE COMMENT: ABNORMAL
EGFRCR SERPLBLD CKD-EPI 2021: 56 ML/MIN/1.73
EGFRCR SERPLBLD CKD-EPI 2021: 60.4 ML/MIN/1.73
EGFRCR SERPLBLD CKD-EPI 2021: 62 ML/MIN/1.73
EGFRCR SERPLBLD CKD-EPI 2021: 65.4 ML/MIN/1.73
EGFRCR SERPLBLD CKD-EPI 2021: 66.3 ML/MIN/1.73
EGFRCR SERPLBLD CKD-EPI 2021: 69.2 ML/MIN/1.73
EGFRCR SERPLBLD CKD-EPI 2021: 72.3 ML/MIN/1.73
EGFRCR SERPLBLD CKD-EPI 2021: 72.3 ML/MIN/1.73
EGFRCR SERPLBLD CKD-EPI 2021: 76.9 ML/MIN/1.73
EOSINOPHIL # BLD MANUAL: 0.63 10*3/MM3 (ref 0–0.4)
EOSINOPHIL # BLD MANUAL: 0.85 10*3/MM3 (ref 0–0.4)
EOSINOPHIL # BLD MANUAL: 0.98 10*3/MM3 (ref 0–0.4)
EOSINOPHIL NFR BLD MANUAL: 1 % (ref 0.3–6.2)
ERYTHROCYTE [DISTWIDTH] IN BLOOD BY AUTOMATED COUNT: 19 % (ref 12.3–15.4)
ERYTHROCYTE [DISTWIDTH] IN BLOOD BY AUTOMATED COUNT: 19.3 % (ref 12.3–15.4)
ERYTHROCYTE [DISTWIDTH] IN BLOOD BY AUTOMATED COUNT: 19.4 % (ref 12.3–15.4)
ERYTHROCYTE [DISTWIDTH] IN BLOOD BY AUTOMATED COUNT: 19.6 % (ref 12.3–15.4)
ERYTHROCYTE [DISTWIDTH] IN BLOOD BY AUTOMATED COUNT: 19.7 % (ref 12.3–15.4)
ERYTHROCYTE [DISTWIDTH] IN BLOOD BY AUTOMATED COUNT: 19.7 % (ref 12.3–15.4)
ERYTHROCYTE [DISTWIDTH] IN BLOOD BY AUTOMATED COUNT: 19.8 % (ref 12.3–15.4)
ERYTHROCYTE [DISTWIDTH] IN BLOOD BY AUTOMATED COUNT: 19.8 % (ref 12.3–15.4)
ERYTHROCYTE [DISTWIDTH] IN BLOOD BY AUTOMATED COUNT: 19.9 % (ref 12.3–15.4)
ERYTHROCYTE [DISTWIDTH] IN BLOOD BY AUTOMATED COUNT: 20 % (ref 12.3–15.4)
ERYTHROCYTE [DISTWIDTH] IN BLOOD BY AUTOMATED COUNT: 20.8 % (ref 12.3–15.4)
FLUAV SUBTYP SPEC NAA+PROBE: NOT DETECTED
FLUBV RNA ISLT QL NAA+PROBE: NOT DETECTED
GAS FLOW AIRWAY: 12 LPM
GEN 5 2HR TROPONIN T REFLEX: 22 NG/L
GEN 5 2HR TROPONIN T REFLEX: 32 NG/L
GLOBULIN UR ELPH-MCNC: 2.1 GM/DL
GLOBULIN UR ELPH-MCNC: 2.5 GM/DL
GLOBULIN UR ELPH-MCNC: 2.7 GM/DL
GLUCOSE SERPL-MCNC: 115 MG/DL (ref 65–99)
GLUCOSE SERPL-MCNC: 116 MG/DL (ref 65–99)
GLUCOSE SERPL-MCNC: 133 MG/DL (ref 65–99)
GLUCOSE SERPL-MCNC: 202 MG/DL (ref 65–99)
GLUCOSE SERPL-MCNC: 210 MG/DL (ref 65–99)
GLUCOSE SERPL-MCNC: 65 MG/DL (ref 65–99)
GLUCOSE SERPL-MCNC: 69 MG/DL (ref 65–99)
GLUCOSE SERPL-MCNC: 69 MG/DL (ref 65–99)
GLUCOSE SERPL-MCNC: 78 MG/DL (ref 65–99)
GLUCOSE SERPL-MCNC: 80 MG/DL (ref 65–99)
GLUCOSE SERPL-MCNC: 84 MG/DL (ref 65–99)
GLUCOSE UR STRIP-MCNC: NEGATIVE MG/DL
GRAM STN SPEC: ABNORMAL
HADV DNA SPEC NAA+PROBE: NOT DETECTED
HCO3 BLDA-SCNC: 25.6 MMOL/L (ref 22–28)
HCOV 229E RNA SPEC QL NAA+PROBE: NOT DETECTED
HCOV HKU1 RNA SPEC QL NAA+PROBE: NOT DETECTED
HCOV NL63 RNA SPEC QL NAA+PROBE: NOT DETECTED
HCOV OC43 RNA SPEC QL NAA+PROBE: NOT DETECTED
HCT VFR BLD AUTO: 22.9 % (ref 34–46.6)
HCT VFR BLD AUTO: 23 % (ref 34–46.6)
HCT VFR BLD AUTO: 24.4 % (ref 34–46.6)
HCT VFR BLD AUTO: 24.9 % (ref 34–46.6)
HCT VFR BLD AUTO: 26.2 % (ref 34–46.6)
HCT VFR BLD AUTO: 26.3 % (ref 34–46.6)
HCT VFR BLD AUTO: 29.2 % (ref 34–46.6)
HCT VFR BLD AUTO: 29.3 % (ref 34–46.6)
HCT VFR BLD AUTO: 31.4 % (ref 34–46.6)
HCT VFR BLD AUTO: 32.6 % (ref 34–46.6)
HCT VFR BLD AUTO: 33.8 % (ref 34–46.6)
HEMODILUTION: NO
HGB BLD-MCNC: 10.1 G/DL (ref 12–15.9)
HGB BLD-MCNC: 10.6 G/DL (ref 12–15.9)
HGB BLD-MCNC: 10.7 G/DL (ref 12–15.9)
HGB BLD-MCNC: 7.1 G/DL (ref 12–15.9)
HGB BLD-MCNC: 7.4 G/DL (ref 12–15.9)
HGB BLD-MCNC: 7.5 G/DL (ref 12–15.9)
HGB BLD-MCNC: 8.1 G/DL (ref 12–15.9)
HGB BLD-MCNC: 8.4 G/DL (ref 12–15.9)
HGB BLD-MCNC: 8.5 G/DL (ref 12–15.9)
HGB BLD-MCNC: 9.4 G/DL (ref 12–15.9)
HGB BLD-MCNC: 9.5 G/DL (ref 12–15.9)
HGB UR QL STRIP.AUTO: NEGATIVE
HMPV RNA NPH QL NAA+NON-PROBE: NOT DETECTED
HPIV1 RNA ISLT QL NAA+PROBE: NOT DETECTED
HPIV2 RNA SPEC QL NAA+PROBE: NOT DETECTED
HPIV3 RNA NPH QL NAA+PROBE: NOT DETECTED
HPIV4 P GENE NPH QL NAA+PROBE: NOT DETECTED
ISOLATED FROM: ABNORMAL
KETONES UR QL STRIP: NEGATIVE
LEFT ATRIUM VOLUME INDEX: 29 ML/M2
LEUKOCYTE ESTERASE UR QL STRIP.AUTO: NEGATIVE
LIPASE SERPL-CCNC: 7 U/L (ref 13–60)
LYMPHOCYTES # BLD MANUAL: 0.63 10*3/MM3 (ref 0.7–3.1)
LYMPHOCYTES # BLD MANUAL: 0.98 10*3/MM3 (ref 0.7–3.1)
LYMPHOCYTES # BLD MANUAL: 2.55 10*3/MM3 (ref 0.7–3.1)
LYMPHOCYTES # BLD MANUAL: 2.94 10*3/MM3 (ref 0.7–3.1)
LYMPHOCYTES # BLD MANUAL: 20.41 10*3/MM3 (ref 0.7–3.1)
LYMPHOCYTES # BLD MANUAL: 4.08 10*3/MM3 (ref 0.7–3.1)
LYMPHOCYTES # BLD MANUAL: 4.73 10*3/MM3 (ref 0.7–3.1)
LYMPHOCYTES NFR BLD MANUAL: 14 % (ref 5–12)
LYMPHOCYTES NFR BLD MANUAL: 15 % (ref 5–12)
LYMPHOCYTES NFR BLD MANUAL: 5 % (ref 5–12)
LYMPHOCYTES NFR BLD MANUAL: 5 % (ref 5–12)
LYMPHOCYTES NFR BLD MANUAL: 6 % (ref 5–12)
LYMPHOCYTES NFR BLD MANUAL: 7 % (ref 5–12)
LYMPHOCYTES NFR BLD MANUAL: 8 % (ref 5–12)
Lab: ABNORMAL
M PNEUMO IGG SER IA-ACNC: NOT DETECTED
MAGNESIUM SERPL-MCNC: 1.7 MG/DL (ref 1.6–2.4)
MAGNESIUM SERPL-MCNC: 1.7 MG/DL (ref 1.6–2.4)
MAGNESIUM SERPL-MCNC: 1.9 MG/DL (ref 1.6–2.4)
MAGNESIUM SERPL-MCNC: 2.2 MG/DL (ref 1.6–2.4)
MCH RBC QN AUTO: 28.2 PG (ref 26.6–33)
MCH RBC QN AUTO: 29.5 PG (ref 26.6–33)
MCH RBC QN AUTO: 29.6 PG (ref 26.6–33)
MCH RBC QN AUTO: 29.7 PG (ref 26.6–33)
MCH RBC QN AUTO: 29.7 PG (ref 26.6–33)
MCH RBC QN AUTO: 29.9 PG (ref 26.6–33)
MCH RBC QN AUTO: 30 PG (ref 26.6–33)
MCH RBC QN AUTO: 30.3 PG (ref 26.6–33)
MCH RBC QN AUTO: 30.7 PG (ref 26.6–33)
MCH RBC QN AUTO: 30.7 PG (ref 26.6–33)
MCH RBC QN AUTO: 30.8 PG (ref 26.6–33)
MCHC RBC AUTO-ENTMCNC: 30.7 G/DL (ref 31.5–35.7)
MCHC RBC AUTO-ENTMCNC: 31 G/DL (ref 31.5–35.7)
MCHC RBC AUTO-ENTMCNC: 31.7 G/DL (ref 31.5–35.7)
MCHC RBC AUTO-ENTMCNC: 32.1 G/DL (ref 31.5–35.7)
MCHC RBC AUTO-ENTMCNC: 32.1 G/DL (ref 31.5–35.7)
MCHC RBC AUTO-ENTMCNC: 32.2 G/DL (ref 31.5–35.7)
MCHC RBC AUTO-ENTMCNC: 32.2 G/DL (ref 31.5–35.7)
MCHC RBC AUTO-ENTMCNC: 32.3 G/DL (ref 31.5–35.7)
MCHC RBC AUTO-ENTMCNC: 32.5 G/DL (ref 31.5–35.7)
MCV RBC AUTO: 91.7 FL (ref 79–97)
MCV RBC AUTO: 92.3 FL (ref 79–97)
MCV RBC AUTO: 92.4 FL (ref 79–97)
MCV RBC AUTO: 92.6 FL (ref 79–97)
MCV RBC AUTO: 93.1 FL (ref 79–97)
MCV RBC AUTO: 93.3 FL (ref 79–97)
MCV RBC AUTO: 93.6 FL (ref 79–97)
MCV RBC AUTO: 94.5 FL (ref 79–97)
MCV RBC AUTO: 94.8 FL (ref 79–97)
MCV RBC AUTO: 95.4 FL (ref 79–97)
MCV RBC AUTO: 95.8 FL (ref 79–97)
METAMYELOCYTES NFR BLD MANUAL: 1 % (ref 0–0)
METAMYELOCYTES NFR BLD MANUAL: 1 % (ref 0–0)
METAMYELOCYTES NFR BLD MANUAL: 21 % (ref 0–0)
METAMYELOCYTES NFR BLD MANUAL: 5 % (ref 0–0)
METAMYELOCYTES NFR BLD MANUAL: 6 % (ref 0–0)
MODALITY: ABNORMAL
MONOCYTES # BLD: 11.92 10*3/MM3 (ref 0.1–0.9)
MONOCYTES # BLD: 14.68 10*3/MM3 (ref 0.1–0.9)
MONOCYTES # BLD: 4.86 10*3/MM3 (ref 0.1–0.9)
MONOCYTES # BLD: 4.9 10*3/MM3 (ref 0.1–0.9)
MONOCYTES # BLD: 4.9 10*3/MM3 (ref 0.1–0.9)
MONOCYTES # BLD: 5.05 10*3/MM3 (ref 0.1–0.9)
MONOCYTES # BLD: 5.52 10*3/MM3 (ref 0.1–0.9)
MYELOCYTES NFR BLD MANUAL: 10 % (ref 0–0)
MYELOCYTES NFR BLD MANUAL: 2 % (ref 0–0)
MYELOCYTES NFR BLD MANUAL: 2 % (ref 0–0)
MYELOCYTES NFR BLD MANUAL: 4 % (ref 0–0)
NEUTROPHILS # BLD AUTO: 50.49 10*3/MM3 (ref 1.7–7)
NEUTROPHILS # BLD AUTO: 53.67 10*3/MM3 (ref 1.7–7)
NEUTROPHILS # BLD AUTO: 59.6 10*3/MM3 (ref 1.7–7)
NEUTROPHILS # BLD AUTO: 62.17 10*3/MM3 (ref 1.7–7)
NEUTROPHILS # BLD AUTO: 71.92 10*3/MM3 (ref 1.7–7)
NEUTROPHILS # BLD AUTO: 77.33 10*3/MM3 (ref 1.7–7)
NEUTROPHILS # BLD AUTO: 87.3 10*3/MM3 (ref 1.7–7)
NEUTROPHILS NFR BLD MANUAL: 64 % (ref 42.7–76)
NEUTROPHILS NFR BLD MANUAL: 73 % (ref 42.7–76)
NEUTROPHILS NFR BLD MANUAL: 73 % (ref 42.7–76)
NEUTROPHILS NFR BLD MANUAL: 74 % (ref 42.7–76)
NEUTROPHILS NFR BLD MANUAL: 79 % (ref 42.7–76)
NEUTROPHILS NFR BLD MANUAL: 85 % (ref 42.7–76)
NEUTROPHILS NFR BLD MANUAL: 89 % (ref 42.7–76)
NEUTS BAND NFR BLD MANUAL: ABNORMAL %
NITRITE UR QL STRIP: NEGATIVE
NOTIFIED WHO: ABNORMAL
NRBC BLD AUTO-RTO: 0.3 /100 WBC (ref 0–0.2)
NRBC BLD AUTO-RTO: 0.4 /100 WBC (ref 0–0.2)
NRBC SPEC MANUAL: 1 /100 WBC (ref 0–0.2)
NT-PROBNP SERPL-MCNC: 3717 PG/ML (ref 0–1800)
NT-PROBNP SERPL-MCNC: 8066 PG/ML (ref 0–1800)
PCO2 BLDA: 35.7 MM HG (ref 35–45)
PH BLDA: 7.46 PH UNITS (ref 7.35–7.45)
PH UR STRIP.AUTO: 7 [PH] (ref 5–8)
PHOSPHATE SERPL-MCNC: 4.3 MG/DL (ref 2.5–4.5)
PLAT MORPH BLD: NORMAL
PLATELET # BLD AUTO: 232 10*3/MM3 (ref 140–450)
PLATELET # BLD AUTO: 268 10*3/MM3 (ref 140–450)
PLATELET # BLD AUTO: 273 10*3/MM3 (ref 140–450)
PLATELET # BLD AUTO: 285 10*3/MM3 (ref 140–450)
PLATELET # BLD AUTO: 291 10*3/MM3 (ref 140–450)
PLATELET # BLD AUTO: 293 10*3/MM3 (ref 140–450)
PLATELET # BLD AUTO: 298 10*3/MM3 (ref 140–450)
PLATELET # BLD AUTO: 299 10*3/MM3 (ref 140–450)
PLATELET # BLD AUTO: 300 10*3/MM3 (ref 140–450)
PLATELET # BLD AUTO: 301 10*3/MM3 (ref 140–450)
PLATELET # BLD AUTO: 381 10*3/MM3 (ref 140–450)
PMV BLD AUTO: 11.5 FL (ref 6–12)
PMV BLD AUTO: 11.7 FL (ref 6–12)
PMV BLD AUTO: 11.8 FL (ref 6–12)
PMV BLD AUTO: 11.8 FL (ref 6–12)
PMV BLD AUTO: 11.9 FL (ref 6–12)
PMV BLD AUTO: 12 FL (ref 6–12)
PMV BLD AUTO: 12.2 FL (ref 6–12)
PMV BLD AUTO: 12.2 FL (ref 6–12)
PMV BLD AUTO: 12.5 FL (ref 6–12)
PMV BLD AUTO: 12.6 FL (ref 6–12)
PMV BLD AUTO: 12.7 FL (ref 6–12)
PO2 BLDA: 51.8 MM HG (ref 80–100)
POIKILOCYTOSIS BLD QL SMEAR: ABNORMAL
POTASSIUM SERPL-SCNC: 2.7 MMOL/L (ref 3.5–5.2)
POTASSIUM SERPL-SCNC: 3 MMOL/L (ref 3.5–5.2)
POTASSIUM SERPL-SCNC: 3.3 MMOL/L (ref 3.5–5.2)
POTASSIUM SERPL-SCNC: 4 MMOL/L (ref 3.5–5.2)
POTASSIUM SERPL-SCNC: 4 MMOL/L (ref 3.5–5.2)
POTASSIUM SERPL-SCNC: 4.1 MMOL/L (ref 3.5–5.2)
POTASSIUM SERPL-SCNC: 4.1 MMOL/L (ref 3.5–5.2)
POTASSIUM SERPL-SCNC: 4.2 MMOL/L (ref 3.5–5.2)
POTASSIUM SERPL-SCNC: 4.4 MMOL/L (ref 3.5–5.2)
POTASSIUM SERPL-SCNC: 4.5 MMOL/L (ref 3.5–5.2)
POTASSIUM SERPL-SCNC: 4.7 MMOL/L (ref 3.5–5.2)
POTASSIUM SERPL-SCNC: 4.7 MMOL/L (ref 3.5–5.2)
PROCALCITONIN SERPL-MCNC: 0.14 NG/ML (ref 0–0.25)
PROCALCITONIN SERPL-MCNC: 0.25 NG/ML (ref 0–0.25)
PROT SERPL-MCNC: 4.6 G/DL (ref 6–8.5)
PROT SERPL-MCNC: 5.6 G/DL (ref 6–8.5)
PROT SERPL-MCNC: 6.2 G/DL (ref 6–8.5)
PROT SERPL-MCNC: 6.7 G/DL (ref 6–8.5)
PROT UR QL STRIP: NEGATIVE
QT INTERVAL: 270 MS
QT INTERVAL: 286 MS
QT INTERVAL: 330 MS
QT INTERVAL: 332 MS
QT INTERVAL: 348 MS
QT INTERVAL: 371 MS
QT INTERVAL: 375 MS
QT INTERVAL: 379 MS
QT INTERVAL: 393 MS
QT INTERVAL: 516 MS
QTC INTERVAL: 417 MS
QTC INTERVAL: 423 MS
QTC INTERVAL: 452 MS
QTC INTERVAL: 456 MS
QTC INTERVAL: 470 MS
QTC INTERVAL: 491 MS
QTC INTERVAL: 503 MS
QTC INTERVAL: 506 MS
QTC INTERVAL: 530 MS
QTC INTERVAL: 553 MS
RBC # BLD AUTO: 2.39 10*6/MM3 (ref 3.77–5.28)
RBC # BLD AUTO: 2.41 10*6/MM3 (ref 3.77–5.28)
RBC # BLD AUTO: 2.66 10*6/MM3 (ref 3.77–5.28)
RBC # BLD AUTO: 2.67 10*6/MM3 (ref 3.77–5.28)
RBC # BLD AUTO: 2.84 10*6/MM3 (ref 3.77–5.28)
RBC # BLD AUTO: 2.84 10*6/MM3 (ref 3.77–5.28)
RBC # BLD AUTO: 3.09 10*6/MM3 (ref 3.77–5.28)
RBC # BLD AUTO: 3.13 10*6/MM3 (ref 3.77–5.28)
RBC # BLD AUTO: 3.4 10*6/MM3 (ref 3.77–5.28)
RBC # BLD AUTO: 3.44 10*6/MM3 (ref 3.77–5.28)
RBC # BLD AUTO: 3.63 10*6/MM3 (ref 3.77–5.28)
RBC MORPH BLD: NORMAL
READ BACK: YES
RH BLD: POSITIVE
RHINOVIRUS RNA SPEC NAA+PROBE: NOT DETECTED
RSV RNA NPH QL NAA+NON-PROBE: NOT DETECTED
SAO2 % BLDCOA: 88.4 % (ref 92–98.5)
SARS-COV-2 RNA NPH QL NAA+NON-PROBE: NOT DETECTED
SET MECH RESP RATE: 32
SINUS: 3.1 CM
SMALL PLATELETS BLD QL SMEAR: ADEQUATE
SODIUM SERPL-SCNC: 133 MMOL/L (ref 136–145)
SODIUM SERPL-SCNC: 134 MMOL/L (ref 136–145)
SODIUM SERPL-SCNC: 135 MMOL/L (ref 136–145)
SODIUM SERPL-SCNC: 136 MMOL/L (ref 136–145)
SODIUM SERPL-SCNC: 137 MMOL/L (ref 136–145)
SODIUM SERPL-SCNC: 139 MMOL/L (ref 136–145)
SODIUM SERPL-SCNC: 140 MMOL/L (ref 136–145)
SP GR UR STRIP: 1.01 (ref 1–1.03)
SPHEROCYTES BLD QL SMEAR: ABNORMAL
STJ: 2.8 CM
T&S EXPIRATION DATE: NORMAL
TROPONIN T DELTA: -8 NG/L
TROPONIN T DELTA: 5 NG/L
TROPONIN T SERPL HS-MCNC: 27 NG/L
TROPONIN T SERPL HS-MCNC: 27 NG/L
TROPONIN T SERPL HS-MCNC: 29 NG/L
TROPONIN T SERPL HS-MCNC: 30 NG/L
TROPONIN T SERPL HS-MCNC: 33 NG/L
TSH SERPL DL<=0.05 MIU/L-ACNC: 0.73 UIU/ML (ref 0.27–4.2)
UNIT  ABO: NORMAL
UNIT  RH: NORMAL
UROBILINOGEN UR QL STRIP: NORMAL
VANCOMYCIN TROUGH SERPL-MCNC: 15.2 MCG/ML (ref 5–20)
VANCOMYCIN TROUGH SERPL-MCNC: 21.3 MCG/ML (ref 5–20)
VARIANT LYMPHS NFR BLD MANUAL: 1 % (ref 19.6–45.3)
VARIANT LYMPHS NFR BLD MANUAL: 1 % (ref 19.6–45.3)
VARIANT LYMPHS NFR BLD MANUAL: 21 % (ref 19.6–45.3)
VARIANT LYMPHS NFR BLD MANUAL: 3 % (ref 19.6–45.3)
VARIANT LYMPHS NFR BLD MANUAL: 3 % (ref 19.6–45.3)
VARIANT LYMPHS NFR BLD MANUAL: 5 % (ref 19.6–45.3)
VARIANT LYMPHS NFR BLD MANUAL: 6 % (ref 19.6–45.3)
WBC MORPH BLD: NORMAL
WBC NRBC COR # BLD AUTO: 63.14 10*3/MM3 (ref 3.4–10.8)
WBC NRBC COR # BLD AUTO: 78.89 10*3/MM3 (ref 3.4–10.8)
WBC NRBC COR # BLD AUTO: 81.65 10*3/MM3 (ref 3.4–10.8)
WBC NRBC COR # BLD AUTO: 83.18 10*3/MM3 (ref 3.4–10.8)
WBC NRBC COR # BLD AUTO: 84.33 10*3/MM3 (ref 3.4–10.8)
WBC NRBC COR # BLD AUTO: 85.16 10*3/MM3 (ref 3.4–10.8)
WBC NRBC COR # BLD AUTO: 87.18 10*3/MM3 (ref 3.4–10.8)
WBC NRBC COR # BLD AUTO: 97.19 10*3/MM3 (ref 3.4–10.8)
WBC NRBC COR # BLD AUTO: 97.89 10*3/MM3 (ref 3.4–10.8)
WBC NRBC COR # BLD AUTO: 98.09 10*3/MM3 (ref 3.4–10.8)
WBC NRBC COR # BLD AUTO: 98.25 10*3/MM3 (ref 3.4–10.8)

## 2023-01-01 PROCEDURE — 85025 COMPLETE CBC W/AUTO DIFF WBC: CPT | Performed by: INTERNAL MEDICINE

## 2023-01-01 PROCEDURE — 25010000002 ONDANSETRON PER 1 MG: Performed by: NURSE PRACTITIONER

## 2023-01-01 PROCEDURE — 25010000002 AMIODARONE IN DEXTROSE 5% 360-4.14 MG/200ML-% SOLUTION: Performed by: NURSE PRACTITIONER

## 2023-01-01 PROCEDURE — 25010000002 MORPHINE PER 10 MG: Performed by: STUDENT IN AN ORGANIZED HEALTH CARE EDUCATION/TRAINING PROGRAM

## 2023-01-01 PROCEDURE — 85007 BL SMEAR W/DIFF WBC COUNT: CPT | Performed by: INTERNAL MEDICINE

## 2023-01-01 PROCEDURE — 25010000002 HYDROMORPHONE PER 4 MG: Performed by: INTERNAL MEDICINE

## 2023-01-01 PROCEDURE — 85025 COMPLETE CBC W/AUTO DIFF WBC: CPT | Performed by: STUDENT IN AN ORGANIZED HEALTH CARE EDUCATION/TRAINING PROGRAM

## 2023-01-01 PROCEDURE — 25010000002 PROCHLORPERAZINE 10 MG/2ML SOLUTION: Performed by: STUDENT IN AN ORGANIZED HEALTH CARE EDUCATION/TRAINING PROGRAM

## 2023-01-01 PROCEDURE — 83605 ASSAY OF LACTIC ACID: CPT | Performed by: STUDENT IN AN ORGANIZED HEALTH CARE EDUCATION/TRAINING PROGRAM

## 2023-01-01 PROCEDURE — 87186 SC STD MICRODIL/AGAR DIL: CPT | Performed by: STUDENT IN AN ORGANIZED HEALTH CARE EDUCATION/TRAINING PROGRAM

## 2023-01-01 PROCEDURE — 87147 CULTURE TYPE IMMUNOLOGIC: CPT | Performed by: STUDENT IN AN ORGANIZED HEALTH CARE EDUCATION/TRAINING PROGRAM

## 2023-01-01 PROCEDURE — 94761 N-INVAS EAR/PLS OXIMETRY MLT: CPT

## 2023-01-01 PROCEDURE — 63710000001 PREDNISONE PER 1 MG: Performed by: STUDENT IN AN ORGANIZED HEALTH CARE EDUCATION/TRAINING PROGRAM

## 2023-01-01 PROCEDURE — 97166 OT EVAL MOD COMPLEX 45 MIN: CPT

## 2023-01-01 PROCEDURE — C1894 INTRO/SHEATH, NON-LASER: HCPCS | Performed by: INTERNAL MEDICINE

## 2023-01-01 PROCEDURE — 93010 ELECTROCARDIOGRAM REPORT: CPT | Performed by: INTERNAL MEDICINE

## 2023-01-01 PROCEDURE — 84145 PROCALCITONIN (PCT): CPT | Performed by: STUDENT IN AN ORGANIZED HEALTH CARE EDUCATION/TRAINING PROGRAM

## 2023-01-01 PROCEDURE — 25510000001 IOPAMIDOL PER 1 ML: Performed by: INTERNAL MEDICINE

## 2023-01-01 PROCEDURE — 94799 UNLISTED PULMONARY SVC/PX: CPT

## 2023-01-01 PROCEDURE — 63710000001 PREDNISONE PER 5 MG: Performed by: INTERNAL MEDICINE

## 2023-01-01 PROCEDURE — 99231 SBSQ HOSP IP/OBS SF/LOW 25: CPT | Performed by: INTERNAL MEDICINE

## 2023-01-01 PROCEDURE — 80048 BASIC METABOLIC PNL TOTAL CA: CPT | Performed by: STUDENT IN AN ORGANIZED HEALTH CARE EDUCATION/TRAINING PROGRAM

## 2023-01-01 PROCEDURE — 93005 ELECTROCARDIOGRAM TRACING: CPT | Performed by: STUDENT IN AN ORGANIZED HEALTH CARE EDUCATION/TRAINING PROGRAM

## 2023-01-01 PROCEDURE — 97530 THERAPEUTIC ACTIVITIES: CPT

## 2023-01-01 PROCEDURE — 83735 ASSAY OF MAGNESIUM: CPT | Performed by: INTERNAL MEDICINE

## 2023-01-01 PROCEDURE — 94640 AIRWAY INHALATION TREATMENT: CPT

## 2023-01-01 PROCEDURE — 83880 ASSAY OF NATRIURETIC PEPTIDE: CPT | Performed by: INTERNAL MEDICINE

## 2023-01-01 PROCEDURE — 94664 DEMO&/EVAL PT USE INHALER: CPT

## 2023-01-01 PROCEDURE — 25010000002 VANCOMYCIN 10 G RECONSTITUTED SOLUTION: Performed by: STUDENT IN AN ORGANIZED HEALTH CARE EDUCATION/TRAINING PROGRAM

## 2023-01-01 PROCEDURE — 25010000002 MORPHINE PER 10 MG: Performed by: INTERNAL MEDICINE

## 2023-01-01 PROCEDURE — 25010000002 DIGOXIN PER 500 MCG: Performed by: INTERNAL MEDICINE

## 2023-01-01 PROCEDURE — 99232 SBSQ HOSP IP/OBS MODERATE 35: CPT | Performed by: NURSE PRACTITIONER

## 2023-01-01 PROCEDURE — 25010000002 MAGNESIUM SULFATE 2 GM/50ML SOLUTION: Performed by: INTERNAL MEDICINE

## 2023-01-01 PROCEDURE — 25510000001 IOPAMIDOL PER 1 ML: Performed by: STUDENT IN AN ORGANIZED HEALTH CARE EDUCATION/TRAINING PROGRAM

## 2023-01-01 PROCEDURE — 25010000002 FENTANYL CITRATE (PF) 50 MCG/ML SOLUTION: Performed by: INTERNAL MEDICINE

## 2023-01-01 PROCEDURE — 99152 MOD SED SAME PHYS/QHP 5/>YRS: CPT | Performed by: INTERNAL MEDICINE

## 2023-01-01 PROCEDURE — 36600 WITHDRAWAL OF ARTERIAL BLOOD: CPT

## 2023-01-01 PROCEDURE — 99232 SBSQ HOSP IP/OBS MODERATE 35: CPT | Performed by: INTERNAL MEDICINE

## 2023-01-01 PROCEDURE — 80202 ASSAY OF VANCOMYCIN: CPT | Performed by: INTERNAL MEDICINE

## 2023-01-01 PROCEDURE — 84484 ASSAY OF TROPONIN QUANT: CPT | Performed by: STUDENT IN AN ORGANIZED HEALTH CARE EDUCATION/TRAINING PROGRAM

## 2023-01-01 PROCEDURE — 90791 PSYCH DIAGNOSTIC EVALUATION: CPT | Performed by: SOCIAL WORKER

## 2023-01-01 PROCEDURE — 71045 X-RAY EXAM CHEST 1 VIEW: CPT

## 2023-01-01 PROCEDURE — 97162 PT EVAL MOD COMPLEX 30 MIN: CPT

## 2023-01-01 PROCEDURE — 99233 SBSQ HOSP IP/OBS HIGH 50: CPT | Performed by: INTERNAL MEDICINE

## 2023-01-01 PROCEDURE — 84484 ASSAY OF TROPONIN QUANT: CPT | Performed by: INTERNAL MEDICINE

## 2023-01-01 PROCEDURE — 87040 BLOOD CULTURE FOR BACTERIA: CPT | Performed by: STUDENT IN AN ORGANIZED HEALTH CARE EDUCATION/TRAINING PROGRAM

## 2023-01-01 PROCEDURE — 25010000002 AMPICILLIN-SULBACTAM PER 1.5 G: Performed by: NURSE PRACTITIONER

## 2023-01-01 PROCEDURE — 25010000002 HYDROCORTISONE SOD SUC (PF) 100 MG RECONSTITUTED SOLUTION: Performed by: INTERNAL MEDICINE

## 2023-01-01 PROCEDURE — 80048 BASIC METABOLIC PNL TOTAL CA: CPT | Performed by: INTERNAL MEDICINE

## 2023-01-01 PROCEDURE — 74174 CTA ABD&PLVS W/CONTRAST: CPT

## 2023-01-01 PROCEDURE — 82803 BLOOD GASES ANY COMBINATION: CPT

## 2023-01-01 PROCEDURE — 85027 COMPLETE CBC AUTOMATED: CPT | Performed by: STUDENT IN AN ORGANIZED HEALTH CARE EDUCATION/TRAINING PROGRAM

## 2023-01-01 PROCEDURE — 87040 BLOOD CULTURE FOR BACTERIA: CPT | Performed by: INTERNAL MEDICINE

## 2023-01-01 PROCEDURE — 83735 ASSAY OF MAGNESIUM: CPT | Performed by: STUDENT IN AN ORGANIZED HEALTH CARE EDUCATION/TRAINING PROGRAM

## 2023-01-01 PROCEDURE — 86901 BLOOD TYPING SEROLOGIC RH(D): CPT | Performed by: INTERNAL MEDICINE

## 2023-01-01 PROCEDURE — 93306 TTE W/DOPPLER COMPLETE: CPT | Performed by: INTERNAL MEDICINE

## 2023-01-01 PROCEDURE — 36415 COLL VENOUS BLD VENIPUNCTURE: CPT | Performed by: NURSE PRACTITIONER

## 2023-01-01 PROCEDURE — 86923 COMPATIBILITY TEST ELECTRIC: CPT

## 2023-01-01 PROCEDURE — 99285 EMERGENCY DEPT VISIT HI MDM: CPT

## 2023-01-01 PROCEDURE — 87150 DNA/RNA AMPLIFIED PROBE: CPT | Performed by: STUDENT IN AN ORGANIZED HEALTH CARE EDUCATION/TRAINING PROGRAM

## 2023-01-01 PROCEDURE — 94760 N-INVAS EAR/PLS OXIMETRY 1: CPT

## 2023-01-01 PROCEDURE — 25010000002 FUROSEMIDE PER 20 MG: Performed by: INTERNAL MEDICINE

## 2023-01-01 PROCEDURE — 84132 ASSAY OF SERUM POTASSIUM: CPT | Performed by: STUDENT IN AN ORGANIZED HEALTH CARE EDUCATION/TRAINING PROGRAM

## 2023-01-01 PROCEDURE — 85027 COMPLETE CBC AUTOMATED: CPT | Performed by: NURSE PRACTITIONER

## 2023-01-01 PROCEDURE — 36430 TRANSFUSION BLD/BLD COMPNT: CPT

## 2023-01-01 PROCEDURE — 93005 ELECTROCARDIOGRAM TRACING: CPT | Performed by: NURSE PRACTITIONER

## 2023-01-01 PROCEDURE — C1769 GUIDE WIRE: HCPCS | Performed by: INTERNAL MEDICINE

## 2023-01-01 PROCEDURE — 25810000003 SODIUM CHLORIDE 0.9 % SOLUTION: Performed by: NURSE PRACTITIONER

## 2023-01-01 PROCEDURE — 25010000002 FUROSEMIDE PER 20 MG: Performed by: STUDENT IN AN ORGANIZED HEALTH CARE EDUCATION/TRAINING PROGRAM

## 2023-01-01 PROCEDURE — 25010000002 VANCOMYCIN PER 500 MG: Performed by: INTERNAL MEDICINE

## 2023-01-01 PROCEDURE — 93454 CORONARY ARTERY ANGIO S&I: CPT | Performed by: INTERNAL MEDICINE

## 2023-01-01 PROCEDURE — 93005 ELECTROCARDIOGRAM TRACING: CPT | Performed by: INTERNAL MEDICINE

## 2023-01-01 PROCEDURE — 25010000002 VANCOMYCIN 750 MG RECONSTITUTED SOLUTION 1 EACH VIAL: Performed by: INTERNAL MEDICINE

## 2023-01-01 PROCEDURE — 25010000002 DIGOXIN PER 500 MCG: Performed by: NURSE PRACTITIONER

## 2023-01-01 PROCEDURE — 99223 1ST HOSP IP/OBS HIGH 75: CPT | Performed by: INTERNAL MEDICINE

## 2023-01-01 PROCEDURE — 99239 HOSP IP/OBS DSCHRG MGMT >30: CPT | Performed by: INTERNAL MEDICINE

## 2023-01-01 PROCEDURE — 93306 TTE W/DOPPLER COMPLETE: CPT

## 2023-01-01 PROCEDURE — 05HY33Z INSERTION OF INFUSION DEVICE INTO UPPER VEIN, PERCUTANEOUS APPROACH: ICD-10-PCS | Performed by: STUDENT IN AN ORGANIZED HEALTH CARE EDUCATION/TRAINING PROGRAM

## 2023-01-01 PROCEDURE — 71275 CT ANGIOGRAPHY CHEST: CPT

## 2023-01-01 PROCEDURE — 85007 BL SMEAR W/DIFF WBC COUNT: CPT | Performed by: STUDENT IN AN ORGANIZED HEALTH CARE EDUCATION/TRAINING PROGRAM

## 2023-01-01 PROCEDURE — 25810000003 SODIUM CHLORIDE 0.9 % SOLUTION 250 ML FLEX CONT: Performed by: STUDENT IN AN ORGANIZED HEALTH CARE EDUCATION/TRAINING PROGRAM

## 2023-01-01 PROCEDURE — 97535 SELF CARE MNGMENT TRAINING: CPT

## 2023-01-01 PROCEDURE — 25010000002 ONDANSETRON PER 1 MG: Performed by: STUDENT IN AN ORGANIZED HEALTH CARE EDUCATION/TRAINING PROGRAM

## 2023-01-01 PROCEDURE — 83690 ASSAY OF LIPASE: CPT | Performed by: STUDENT IN AN ORGANIZED HEALTH CARE EDUCATION/TRAINING PROGRAM

## 2023-01-01 PROCEDURE — 0T9B70Z DRAINAGE OF BLADDER WITH DRAINAGE DEVICE, VIA NATURAL OR ARTIFICIAL OPENING: ICD-10-PCS | Performed by: STUDENT IN AN ORGANIZED HEALTH CARE EDUCATION/TRAINING PROGRAM

## 2023-01-01 PROCEDURE — 25010000002 MAGNESIUM SULFATE 4 GM/100ML SOLUTION: Performed by: STUDENT IN AN ORGANIZED HEALTH CARE EDUCATION/TRAINING PROGRAM

## 2023-01-01 PROCEDURE — C1751 CATH, INF, PER/CENT/MIDLINE: HCPCS

## 2023-01-01 PROCEDURE — 25010000002 AMPICILLIN-SULBACTAM PER 1.5 G: Performed by: STUDENT IN AN ORGANIZED HEALTH CARE EDUCATION/TRAINING PROGRAM

## 2023-01-01 PROCEDURE — 84443 ASSAY THYROID STIM HORMONE: CPT | Performed by: INTERNAL MEDICINE

## 2023-01-01 PROCEDURE — 25010000002 POTASSIUM CHLORIDE 10 MEQ/100ML SOLUTION: Performed by: STUDENT IN AN ORGANIZED HEALTH CARE EDUCATION/TRAINING PROGRAM

## 2023-01-01 PROCEDURE — 25010000002 HYDROMORPHONE 1 MG/ML SOLUTION: Performed by: INTERNAL MEDICINE

## 2023-01-01 PROCEDURE — 25010000002 VANCOMYCIN PER 500 MG: Performed by: STUDENT IN AN ORGANIZED HEALTH CARE EDUCATION/TRAINING PROGRAM

## 2023-01-01 PROCEDURE — 25010000002 HEPARIN (PORCINE) PER 1000 UNITS: Performed by: INTERNAL MEDICINE

## 2023-01-01 PROCEDURE — 25010000002 AMIODARONE IN DEXTROSE 5% 150-4.21 MG/100ML-% SOLUTION: Performed by: NURSE PRACTITIONER

## 2023-01-01 PROCEDURE — 25010000002 AZITHROMYCIN PER 500 MG: Performed by: STUDENT IN AN ORGANIZED HEALTH CARE EDUCATION/TRAINING PROGRAM

## 2023-01-01 PROCEDURE — 0202U NFCT DS 22 TRGT SARS-COV-2: CPT | Performed by: STUDENT IN AN ORGANIZED HEALTH CARE EDUCATION/TRAINING PROGRAM

## 2023-01-01 PROCEDURE — 86900 BLOOD TYPING SEROLOGIC ABO: CPT | Performed by: INTERNAL MEDICINE

## 2023-01-01 PROCEDURE — B2111ZZ FLUOROSCOPY OF MULTIPLE CORONARY ARTERIES USING LOW OSMOLAR CONTRAST: ICD-10-PCS | Performed by: INTERNAL MEDICINE

## 2023-01-01 PROCEDURE — 25810000003 SODIUM CHLORIDE 0.9 % SOLUTION 250 ML FLEX CONT: Performed by: INTERNAL MEDICINE

## 2023-01-01 PROCEDURE — 99222 1ST HOSP IP/OBS MODERATE 55: CPT | Performed by: INTERNAL MEDICINE

## 2023-01-01 PROCEDURE — 80076 HEPATIC FUNCTION PANEL: CPT | Performed by: INTERNAL MEDICINE

## 2023-01-01 PROCEDURE — 86900 BLOOD TYPING SEROLOGIC ABO: CPT

## 2023-01-01 PROCEDURE — 25810000003 SODIUM CHLORIDE 0.9 % SOLUTION: Performed by: INTERNAL MEDICINE

## 2023-01-01 PROCEDURE — 36415 COLL VENOUS BLD VENIPUNCTURE: CPT

## 2023-01-01 PROCEDURE — 25810000003 SODIUM CHLORIDE 0.9 % SOLUTION: Performed by: STUDENT IN AN ORGANIZED HEALTH CARE EDUCATION/TRAINING PROGRAM

## 2023-01-01 PROCEDURE — 80053 COMPREHEN METABOLIC PANEL: CPT | Performed by: STUDENT IN AN ORGANIZED HEALTH CARE EDUCATION/TRAINING PROGRAM

## 2023-01-01 PROCEDURE — 80053 COMPREHEN METABOLIC PANEL: CPT | Performed by: NURSE PRACTITIONER

## 2023-01-01 PROCEDURE — 84100 ASSAY OF PHOSPHORUS: CPT | Performed by: STUDENT IN AN ORGANIZED HEALTH CARE EDUCATION/TRAINING PROGRAM

## 2023-01-01 PROCEDURE — 83880 ASSAY OF NATRIURETIC PEPTIDE: CPT | Performed by: STUDENT IN AN ORGANIZED HEALTH CARE EDUCATION/TRAINING PROGRAM

## 2023-01-01 PROCEDURE — 86850 RBC ANTIBODY SCREEN: CPT | Performed by: INTERNAL MEDICINE

## 2023-01-01 PROCEDURE — 81003 URINALYSIS AUTO W/O SCOPE: CPT | Performed by: STUDENT IN AN ORGANIZED HEALTH CARE EDUCATION/TRAINING PROGRAM

## 2023-01-01 PROCEDURE — P9016 RBC LEUKOCYTES REDUCED: HCPCS

## 2023-01-01 RX ORDER — ONDANSETRON 4 MG/1
4 TABLET, FILM COATED ORAL EVERY 6 HOURS PRN
Status: DISCONTINUED | OUTPATIENT
Start: 2023-01-01 | End: 2023-01-01

## 2023-01-01 RX ORDER — POTASSIUM CHLORIDE 750 MG/1
40 TABLET, FILM COATED, EXTENDED RELEASE ORAL EVERY 4 HOURS
Status: DISCONTINUED | OUTPATIENT
Start: 2023-01-01 | End: 2023-01-01

## 2023-01-01 RX ORDER — ACETAMINOPHEN 650 MG/1
650 SUPPOSITORY RECTAL ONCE
Status: COMPLETED | OUTPATIENT
Start: 2023-01-01 | End: 2023-01-01

## 2023-01-01 RX ORDER — LORAZEPAM 2 MG/ML
2 CONCENTRATE ORAL
Status: DISCONTINUED | OUTPATIENT
Start: 2023-01-01 | End: 2023-01-01 | Stop reason: HOSPADM

## 2023-01-01 RX ORDER — MORPHINE SULFATE 10 MG/ML
6 INJECTION INTRAMUSCULAR; INTRAVENOUS; SUBCUTANEOUS
Status: DISCONTINUED | OUTPATIENT
Start: 2023-01-01 | End: 2023-01-01

## 2023-01-01 RX ORDER — DIPHENOXYLATE HYDROCHLORIDE AND ATROPINE SULFATE 2.5; .025 MG/1; MG/1
1 TABLET ORAL
Status: CANCELLED | OUTPATIENT
Start: 2023-01-01

## 2023-01-01 RX ORDER — HYDROMORPHONE HYDROCHLORIDE 1 MG/ML
0.5 INJECTION, SOLUTION INTRAMUSCULAR; INTRAVENOUS; SUBCUTANEOUS
Status: DISCONTINUED | OUTPATIENT
Start: 2023-01-01 | End: 2023-01-01 | Stop reason: HOSPADM

## 2023-01-01 RX ORDER — LORAZEPAM 2 MG/ML
1 CONCENTRATE ORAL
Status: CANCELLED | OUTPATIENT
Start: 2023-01-01 | End: 2023-01-01

## 2023-01-01 RX ORDER — FUROSEMIDE 10 MG/ML
40 INJECTION INTRAMUSCULAR; INTRAVENOUS ONCE
Status: DISCONTINUED | OUTPATIENT
Start: 2023-01-01 | End: 2023-01-01

## 2023-01-01 RX ORDER — LORAZEPAM 2 MG/ML
0.5 CONCENTRATE ORAL
Status: DISCONTINUED | OUTPATIENT
Start: 2023-01-01 | End: 2023-01-01

## 2023-01-01 RX ORDER — AMIODARONE HYDROCHLORIDE 200 MG/1
200 TABLET ORAL 3 TIMES DAILY
Status: DISCONTINUED | OUTPATIENT
Start: 2023-01-01 | End: 2023-01-01

## 2023-01-01 RX ORDER — PREDNISONE 10 MG/1
10 TABLET ORAL
Status: DISCONTINUED | OUTPATIENT
Start: 2023-01-01 | End: 2023-01-01

## 2023-01-01 RX ORDER — BISACODYL 10 MG
10 SUPPOSITORY, RECTAL RECTAL DAILY PRN
Status: DISCONTINUED | OUTPATIENT
Start: 2023-01-01 | End: 2023-01-01

## 2023-01-01 RX ORDER — ACETAMINOPHEN 325 MG/1
650 TABLET ORAL ONCE
Status: COMPLETED | OUTPATIENT
Start: 2023-01-01 | End: 2023-01-01

## 2023-01-01 RX ORDER — LORAZEPAM 2 MG/ML
1 INJECTION INTRAMUSCULAR
Status: CANCELLED | OUTPATIENT
Start: 2023-01-01 | End: 2023-01-01

## 2023-01-01 RX ORDER — ACETAMINOPHEN 325 MG/1
650 TABLET ORAL EVERY 4 HOURS PRN
Status: DISCONTINUED | OUTPATIENT
Start: 2023-01-01 | End: 2023-01-01

## 2023-01-01 RX ORDER — SODIUM CHLORIDE 0.9 % (FLUSH) 0.9 %
20 SYRINGE (ML) INJECTION AS NEEDED
Status: DISCONTINUED | OUTPATIENT
Start: 2023-01-01 | End: 2023-01-01

## 2023-01-01 RX ORDER — LORAZEPAM 4 MG/ML
2 INJECTION, SOLUTION INTRAMUSCULAR; INTRAVENOUS
Status: DISCONTINUED | OUTPATIENT
Start: 2023-01-01 | End: 2023-01-01

## 2023-01-01 RX ORDER — HYDROXYZINE HYDROCHLORIDE 25 MG/1
25 TABLET, FILM COATED ORAL 3 TIMES DAILY PRN
Status: DISCONTINUED | OUTPATIENT
Start: 2023-01-01 | End: 2023-01-01

## 2023-01-01 RX ORDER — LORAZEPAM 2 MG/ML
1 INJECTION INTRAMUSCULAR
Status: DISCONTINUED | OUTPATIENT
Start: 2023-01-01 | End: 2023-01-01 | Stop reason: HOSPADM

## 2023-01-01 RX ORDER — VANCOMYCIN HYDROCHLORIDE 1 G/200ML
1000 INJECTION, SOLUTION INTRAVENOUS EVERY 24 HOURS
Status: DISCONTINUED | OUTPATIENT
Start: 2023-01-01 | End: 2023-01-01

## 2023-01-01 RX ORDER — DIPHENHYDRAMINE HYDROCHLORIDE 50 MG/ML
25 INJECTION INTRAMUSCULAR; INTRAVENOUS EVERY 4 HOURS PRN
Status: DISCONTINUED | OUTPATIENT
Start: 2023-01-01 | End: 2023-01-01 | Stop reason: HOSPADM

## 2023-01-01 RX ORDER — MORPHINE SULFATE 2 MG/ML
2 INJECTION, SOLUTION INTRAMUSCULAR; INTRAVENOUS
Status: DISCONTINUED | OUTPATIENT
Start: 2023-01-01 | End: 2023-01-01

## 2023-01-01 RX ORDER — ONDANSETRON 2 MG/ML
4 INJECTION INTRAMUSCULAR; INTRAVENOUS ONCE
Status: COMPLETED | OUTPATIENT
Start: 2023-01-01 | End: 2023-01-01

## 2023-01-01 RX ORDER — LORAZEPAM 0.5 MG/1
0.25 TABLET ORAL EVERY 6 HOURS PRN
Status: DISCONTINUED | OUTPATIENT
Start: 2023-01-01 | End: 2023-01-01

## 2023-01-01 RX ORDER — ONDANSETRON 4 MG/1
4 TABLET, FILM COATED ORAL EVERY 6 HOURS PRN
Status: DISCONTINUED | OUTPATIENT
Start: 2023-01-01 | End: 2023-01-01 | Stop reason: HOSPADM

## 2023-01-01 RX ORDER — AMOXICILLIN 250 MG
2 CAPSULE ORAL 2 TIMES DAILY
Status: DISCONTINUED | OUTPATIENT
Start: 2023-01-01 | End: 2023-01-01

## 2023-01-01 RX ORDER — PREDNISONE 5 MG/1
5 TABLET ORAL
Status: DISCONTINUED | OUTPATIENT
Start: 2023-12-13 | End: 2023-01-01

## 2023-01-01 RX ORDER — ACETAMINOPHEN 325 MG/1
650 TABLET ORAL EVERY 4 HOURS PRN
Status: CANCELLED | OUTPATIENT
Start: 2023-01-01

## 2023-01-01 RX ORDER — VERAPAMIL HYDROCHLORIDE 2.5 MG/ML
INJECTION, SOLUTION INTRAVENOUS
Status: DISCONTINUED | OUTPATIENT
Start: 2023-01-01 | End: 2023-01-01 | Stop reason: HOSPADM

## 2023-01-01 RX ORDER — LORAZEPAM 2 MG/ML
0.5 CONCENTRATE ORAL
Status: DISCONTINUED | OUTPATIENT
Start: 2023-01-01 | End: 2023-01-01 | Stop reason: HOSPADM

## 2023-01-01 RX ORDER — FUROSEMIDE 10 MG/ML
40 INJECTION INTRAMUSCULAR; INTRAVENOUS EVERY 12 HOURS
Status: COMPLETED | OUTPATIENT
Start: 2023-01-01 | End: 2023-01-01

## 2023-01-01 RX ORDER — DIGOXIN 0.25 MG/ML
250 INJECTION INTRAMUSCULAR; INTRAVENOUS ONCE
Status: COMPLETED | OUTPATIENT
Start: 2023-01-01 | End: 2023-01-01

## 2023-01-01 RX ORDER — LORAZEPAM 4 MG/ML
1 INJECTION, SOLUTION INTRAMUSCULAR; INTRAVENOUS
Status: DISCONTINUED | OUTPATIENT
Start: 2023-01-01 | End: 2023-01-01

## 2023-01-01 RX ORDER — SODIUM CHLORIDE 0.9 % (FLUSH) 0.9 %
10 SYRINGE (ML) INJECTION AS NEEDED
Status: DISCONTINUED | OUTPATIENT
Start: 2023-01-01 | End: 2023-01-01 | Stop reason: HOSPADM

## 2023-01-01 RX ORDER — LORAZEPAM 2 MG/ML
0.5 INJECTION INTRAMUSCULAR
Status: CANCELLED | OUTPATIENT
Start: 2023-01-01 | End: 2023-01-01

## 2023-01-01 RX ORDER — UREA 10 %
3 LOTION (ML) TOPICAL NIGHTLY PRN
Status: DISCONTINUED | OUTPATIENT
Start: 2023-01-01 | End: 2023-01-01

## 2023-01-01 RX ORDER — ACETAMINOPHEN 160 MG/5ML
650 SOLUTION ORAL ONCE
Status: COMPLETED | OUTPATIENT
Start: 2023-01-01 | End: 2023-01-01

## 2023-01-01 RX ORDER — EMOLLIENT COMBINATION NO.110
LOTION (ML) TOPICAL EVERY 12 HOURS SCHEDULED
Status: DISCONTINUED | OUTPATIENT
Start: 2023-01-01 | End: 2023-01-01 | Stop reason: HOSPADM

## 2023-01-01 RX ORDER — SODIUM CHLORIDE 0.9 % (FLUSH) 0.9 %
10 SYRINGE (ML) INJECTION EVERY 12 HOURS SCHEDULED
Status: DISCONTINUED | OUTPATIENT
Start: 2023-01-01 | End: 2023-01-01 | Stop reason: HOSPADM

## 2023-01-01 RX ORDER — ONDANSETRON 2 MG/ML
4 INJECTION INTRAMUSCULAR; INTRAVENOUS EVERY 6 HOURS PRN
Status: DISCONTINUED | OUTPATIENT
Start: 2023-01-01 | End: 2023-01-01 | Stop reason: HOSPADM

## 2023-01-01 RX ORDER — ACETAMINOPHEN 160 MG/5ML
650 SOLUTION ORAL EVERY 4 HOURS PRN
Status: CANCELLED | OUTPATIENT
Start: 2023-01-01

## 2023-01-01 RX ORDER — LORAZEPAM 2 MG/ML
0.5 INJECTION INTRAMUSCULAR
Status: DISCONTINUED | OUTPATIENT
Start: 2023-01-01 | End: 2023-01-01 | Stop reason: HOSPADM

## 2023-01-01 RX ORDER — FUROSEMIDE 10 MG/ML
40 INJECTION INTRAMUSCULAR; INTRAVENOUS ONCE
Status: COMPLETED | OUTPATIENT
Start: 2023-01-01 | End: 2023-01-01

## 2023-01-01 RX ORDER — FAMOTIDINE 20 MG/1
20 TABLET, FILM COATED ORAL DAILY
Status: DISCONTINUED | OUTPATIENT
Start: 2023-01-01 | End: 2023-01-01

## 2023-01-01 RX ORDER — ONDANSETRON 2 MG/ML
4 INJECTION INTRAMUSCULAR; INTRAVENOUS EVERY 6 HOURS PRN
Status: CANCELLED | OUTPATIENT
Start: 2023-01-01

## 2023-01-01 RX ORDER — PANTOPRAZOLE SODIUM 40 MG/10ML
40 INJECTION, POWDER, LYOPHILIZED, FOR SOLUTION INTRAVENOUS
Status: DISCONTINUED | OUTPATIENT
Start: 2023-01-01 | End: 2023-01-01

## 2023-01-01 RX ORDER — LORAZEPAM 2 MG/ML
2 CONCENTRATE ORAL
Status: DISCONTINUED | OUTPATIENT
Start: 2023-01-01 | End: 2023-01-01

## 2023-01-01 RX ORDER — LIDOCAINE HYDROCHLORIDE 20 MG/ML
INJECTION, SOLUTION INFILTRATION; PERINEURAL
Status: DISCONTINUED | OUTPATIENT
Start: 2023-01-01 | End: 2023-01-01 | Stop reason: HOSPADM

## 2023-01-01 RX ORDER — HYDROXYZINE HYDROCHLORIDE 25 MG/1
25 TABLET, FILM COATED ORAL 3 TIMES DAILY PRN
Status: CANCELLED | OUTPATIENT
Start: 2023-01-01

## 2023-01-01 RX ORDER — HYDROMORPHONE HYDROCHLORIDE 1 MG/ML
0.5 INJECTION, SOLUTION INTRAMUSCULAR; INTRAVENOUS; SUBCUTANEOUS
Status: DISCONTINUED | OUTPATIENT
Start: 2023-01-01 | End: 2023-01-01

## 2023-01-01 RX ORDER — LORAZEPAM 2 MG/ML
2 INJECTION INTRAMUSCULAR
Status: DISCONTINUED | OUTPATIENT
Start: 2023-01-01 | End: 2023-01-01 | Stop reason: HOSPADM

## 2023-01-01 RX ORDER — BISACODYL 5 MG/1
5 TABLET, DELAYED RELEASE ORAL DAILY PRN
Status: DISCONTINUED | OUTPATIENT
Start: 2023-01-01 | End: 2023-01-01

## 2023-01-01 RX ORDER — MORPHINE SULFATE 20 MG/ML
5 SOLUTION ORAL
Status: DISCONTINUED | OUTPATIENT
Start: 2023-01-01 | End: 2023-01-01

## 2023-01-01 RX ORDER — ACETAMINOPHEN 650 MG/1
650 SUPPOSITORY RECTAL EVERY 4 HOURS PRN
Status: CANCELLED | OUTPATIENT
Start: 2023-01-01

## 2023-01-01 RX ORDER — LORAZEPAM 2 MG/ML
2 INJECTION INTRAMUSCULAR
Status: CANCELLED | OUTPATIENT
Start: 2023-01-01 | End: 2023-01-01

## 2023-01-01 RX ORDER — SODIUM CHLORIDE 9 MG/ML
40 INJECTION, SOLUTION INTRAVENOUS AS NEEDED
Status: CANCELLED | OUTPATIENT
Start: 2023-01-01

## 2023-01-01 RX ORDER — MORPHINE SULFATE 2 MG/ML
4 INJECTION, SOLUTION INTRAMUSCULAR; INTRAVENOUS
Status: DISCONTINUED | OUTPATIENT
Start: 2023-01-01 | End: 2023-01-01 | Stop reason: HOSPADM

## 2023-01-01 RX ORDER — MORPHINE SULFATE 2 MG/ML
2 INJECTION, SOLUTION INTRAMUSCULAR; INTRAVENOUS
Status: DISCONTINUED | OUTPATIENT
Start: 2023-01-01 | End: 2023-01-01 | Stop reason: HOSPADM

## 2023-01-01 RX ORDER — SODIUM CHLORIDE 0.9 % (FLUSH) 0.9 %
10 SYRINGE (ML) INJECTION EVERY 12 HOURS SCHEDULED
Status: CANCELLED | OUTPATIENT
Start: 2023-01-01

## 2023-01-01 RX ORDER — DIGOXIN 0.25 MG/ML
500 INJECTION INTRAMUSCULAR; INTRAVENOUS ONCE
Status: COMPLETED | OUTPATIENT
Start: 2023-01-01 | End: 2023-01-01

## 2023-01-01 RX ORDER — PANTOPRAZOLE SODIUM 40 MG/1
40 TABLET, DELAYED RELEASE ORAL
Status: DISCONTINUED | OUTPATIENT
Start: 2023-01-01 | End: 2023-01-01

## 2023-01-01 RX ORDER — GLYCOPYRROLATE 0.2 MG/ML
0.2 INJECTION INTRAMUSCULAR; INTRAVENOUS
Status: DISCONTINUED | OUTPATIENT
Start: 2023-01-01 | End: 2023-01-01 | Stop reason: HOSPADM

## 2023-01-01 RX ORDER — LORAZEPAM 0.5 MG/1
0.25 TABLET ORAL EVERY 6 HOURS PRN
Status: DISCONTINUED | OUTPATIENT
Start: 2023-01-01 | End: 2023-01-01 | Stop reason: HOSPADM

## 2023-01-01 RX ORDER — DIPHENOXYLATE HYDROCHLORIDE AND ATROPINE SULFATE 2.5; .025 MG/1; MG/1
1 TABLET ORAL
Status: DISCONTINUED | OUTPATIENT
Start: 2023-01-01 | End: 2023-01-01 | Stop reason: HOSPADM

## 2023-01-01 RX ORDER — LORAZEPAM 0.5 MG/1
0.25 TABLET ORAL EVERY 6 HOURS PRN
Status: CANCELLED | OUTPATIENT
Start: 2023-01-01 | End: 2023-01-01

## 2023-01-01 RX ORDER — NITROGLYCERIN 0.4 MG/1
0.4 TABLET SUBLINGUAL
Status: DISCONTINUED | OUTPATIENT
Start: 2023-01-01 | End: 2023-01-01

## 2023-01-01 RX ORDER — ACETAMINOPHEN 650 MG/1
650 SUPPOSITORY RECTAL EVERY 4 HOURS PRN
Status: DISCONTINUED | OUTPATIENT
Start: 2023-01-01 | End: 2023-01-01

## 2023-01-01 RX ORDER — METOPROLOL SUCCINATE 25 MG/1
25 TABLET, EXTENDED RELEASE ORAL DAILY
Status: DISCONTINUED | OUTPATIENT
Start: 2023-01-01 | End: 2023-01-01

## 2023-01-01 RX ORDER — MORPHINE SULFATE 4 MG/ML
4 INJECTION, SOLUTION INTRAMUSCULAR; INTRAVENOUS
Status: CANCELLED | OUTPATIENT
Start: 2023-01-01 | End: 2023-01-01

## 2023-01-01 RX ORDER — LORAZEPAM 2 MG/ML
1 CONCENTRATE ORAL
Status: DISCONTINUED | OUTPATIENT
Start: 2023-01-01 | End: 2023-01-01 | Stop reason: HOSPADM

## 2023-01-01 RX ORDER — PROCHLORPERAZINE EDISYLATE 5 MG/ML
10 INJECTION INTRAMUSCULAR; INTRAVENOUS ONCE
Status: COMPLETED | OUTPATIENT
Start: 2023-01-01 | End: 2023-01-01

## 2023-01-01 RX ORDER — SODIUM CHLORIDE 9 MG/ML
40 INJECTION, SOLUTION INTRAVENOUS AS NEEDED
Status: DISCONTINUED | OUTPATIENT
Start: 2023-01-01 | End: 2023-01-01

## 2023-01-01 RX ORDER — DIPHENOXYLATE HYDROCHLORIDE AND ATROPINE SULFATE 2.5; .025 MG/1; MG/1
1 TABLET ORAL
Status: DISCONTINUED | OUTPATIENT
Start: 2023-01-01 | End: 2023-01-01

## 2023-01-01 RX ORDER — LORAZEPAM 4 MG/ML
2 INJECTION, SOLUTION INTRAMUSCULAR; INTRAVENOUS
Status: DISCONTINUED | OUTPATIENT
Start: 2023-01-01 | End: 2023-01-01 | Stop reason: HOSPADM

## 2023-01-01 RX ORDER — ACETAMINOPHEN 160 MG/5ML
650 SOLUTION ORAL EVERY 4 HOURS PRN
Status: DISCONTINUED | OUTPATIENT
Start: 2023-01-01 | End: 2023-01-01 | Stop reason: HOSPADM

## 2023-01-01 RX ORDER — ACETAMINOPHEN 325 MG/1
650 TABLET ORAL EVERY 4 HOURS PRN
Status: DISCONTINUED | OUTPATIENT
Start: 2023-01-01 | End: 2023-01-01 | Stop reason: HOSPADM

## 2023-01-01 RX ORDER — LORAZEPAM 4 MG/ML
0.5 INJECTION, SOLUTION INTRAMUSCULAR; INTRAVENOUS
Status: DISCONTINUED | OUTPATIENT
Start: 2023-01-01 | End: 2023-01-01

## 2023-01-01 RX ORDER — MORPHINE SULFATE 2 MG/ML
2 INJECTION, SOLUTION INTRAMUSCULAR; INTRAVENOUS
Status: CANCELLED | OUTPATIENT
Start: 2023-01-01 | End: 2023-01-01

## 2023-01-01 RX ORDER — SODIUM CHLORIDE 0.9 % (FLUSH) 0.9 %
10 SYRINGE (ML) INJECTION AS NEEDED
Status: CANCELLED | OUTPATIENT
Start: 2023-01-01

## 2023-01-01 RX ORDER — POTASSIUM CHLORIDE 7.45 MG/ML
10 INJECTION INTRAVENOUS
Status: COMPLETED | OUTPATIENT
Start: 2023-01-01 | End: 2023-01-01

## 2023-01-01 RX ORDER — KETOROLAC TROMETHAMINE 15 MG/ML
15 INJECTION, SOLUTION INTRAMUSCULAR; INTRAVENOUS EVERY 6 HOURS PRN
Status: DISCONTINUED | OUTPATIENT
Start: 2023-01-01 | End: 2023-01-01

## 2023-01-01 RX ORDER — MORPHINE SULFATE 4 MG/ML
4 INJECTION, SOLUTION INTRAMUSCULAR; INTRAVENOUS
Status: DISCONTINUED | OUTPATIENT
Start: 2023-01-01 | End: 2023-01-01

## 2023-01-01 RX ORDER — POTASSIUM CHLORIDE 750 MG/1
40 TABLET, FILM COATED, EXTENDED RELEASE ORAL EVERY 4 HOURS
Status: COMPLETED | OUTPATIENT
Start: 2023-01-01 | End: 2023-01-01

## 2023-01-01 RX ORDER — SERTRALINE HYDROCHLORIDE 100 MG/1
100 TABLET, FILM COATED ORAL DAILY
Status: DISCONTINUED | OUTPATIENT
Start: 2023-01-01 | End: 2023-01-01

## 2023-01-01 RX ORDER — LORAZEPAM 2 MG/ML
0.5 CONCENTRATE ORAL
Status: CANCELLED | OUTPATIENT
Start: 2023-01-01 | End: 2023-01-01

## 2023-01-01 RX ORDER — POLYETHYLENE GLYCOL 3350 17 G/17G
17 POWDER, FOR SOLUTION ORAL DAILY PRN
Status: DISCONTINUED | OUTPATIENT
Start: 2023-01-01 | End: 2023-01-01

## 2023-01-01 RX ORDER — LORAZEPAM 2 MG/ML
2 CONCENTRATE ORAL
Status: CANCELLED | OUTPATIENT
Start: 2023-01-01 | End: 2023-01-01

## 2023-01-01 RX ORDER — ACETAMINOPHEN 160 MG/5ML
650 SOLUTION ORAL EVERY 4 HOURS PRN
Status: DISCONTINUED | OUTPATIENT
Start: 2023-01-01 | End: 2023-01-01

## 2023-01-01 RX ORDER — FUROSEMIDE 10 MG/ML
40 INJECTION INTRAMUSCULAR; INTRAVENOUS EVERY 8 HOURS
Status: DISCONTINUED | OUTPATIENT
Start: 2023-01-01 | End: 2023-01-01

## 2023-01-01 RX ORDER — LORAZEPAM 2 MG/ML
1 CONCENTRATE ORAL
Status: DISCONTINUED | OUTPATIENT
Start: 2023-01-01 | End: 2023-01-01

## 2023-01-01 RX ORDER — MAGNESIUM SULFATE HEPTAHYDRATE 40 MG/ML
4 INJECTION, SOLUTION INTRAVENOUS ONCE
Status: COMPLETED | OUTPATIENT
Start: 2023-01-01 | End: 2023-01-01

## 2023-01-01 RX ORDER — HYDROMORPHONE HYDROCHLORIDE 1 MG/ML
0.5 INJECTION, SOLUTION INTRAMUSCULAR; INTRAVENOUS; SUBCUTANEOUS
Status: CANCELLED | OUTPATIENT
Start: 2023-01-01 | End: 2023-01-01

## 2023-01-01 RX ORDER — ONDANSETRON 4 MG/1
4 TABLET, FILM COATED ORAL EVERY 6 HOURS PRN
Status: CANCELLED | OUTPATIENT
Start: 2023-01-01

## 2023-01-01 RX ORDER — PREDNISONE 20 MG/1
20 TABLET ORAL
Status: COMPLETED | OUTPATIENT
Start: 2023-01-01 | End: 2023-01-01

## 2023-01-01 RX ORDER — ASPIRIN 81 MG/1
81 TABLET ORAL DAILY
Status: DISCONTINUED | OUTPATIENT
Start: 2023-01-01 | End: 2023-01-01

## 2023-01-01 RX ORDER — MORPHINE SULFATE 20 MG/ML
5 SOLUTION ORAL
Status: DISCONTINUED | OUTPATIENT
Start: 2023-01-01 | End: 2023-01-01 | Stop reason: HOSPADM

## 2023-01-01 RX ORDER — HYDROMORPHONE HYDROCHLORIDE 2 MG/ML
1.5 INJECTION, SOLUTION INTRAMUSCULAR; INTRAVENOUS; SUBCUTANEOUS
Status: DISCONTINUED | OUTPATIENT
Start: 2023-01-01 | End: 2023-01-01 | Stop reason: HOSPADM

## 2023-01-01 RX ORDER — GLYCOPYRROLATE 0.2 MG/ML
0.4 INJECTION INTRAMUSCULAR; INTRAVENOUS
Status: DISCONTINUED | OUTPATIENT
Start: 2023-01-01 | End: 2023-01-01 | Stop reason: HOSPADM

## 2023-01-01 RX ORDER — SODIUM CHLORIDE 9 MG/ML
40 INJECTION, SOLUTION INTRAVENOUS AS NEEDED
Status: DISCONTINUED | OUTPATIENT
Start: 2023-01-01 | End: 2023-01-01 | Stop reason: HOSPADM

## 2023-01-01 RX ORDER — HEPARIN SODIUM 1000 [USP'U]/ML
INJECTION, SOLUTION INTRAVENOUS; SUBCUTANEOUS
Status: DISCONTINUED | OUTPATIENT
Start: 2023-01-01 | End: 2023-01-01 | Stop reason: HOSPADM

## 2023-01-01 RX ORDER — MORPHINE SULFATE 20 MG/ML
5 SOLUTION ORAL
Status: CANCELLED | OUTPATIENT
Start: 2023-01-01 | End: 2023-01-01

## 2023-01-01 RX ORDER — MORPHINE SULFATE 20 MG/ML
10 SOLUTION ORAL
Status: CANCELLED | OUTPATIENT
Start: 2023-01-01 | End: 2023-01-01

## 2023-01-01 RX ORDER — HYDROXYZINE HYDROCHLORIDE 25 MG/1
25 TABLET, FILM COATED ORAL 3 TIMES DAILY PRN
Status: DISCONTINUED | OUTPATIENT
Start: 2023-01-01 | End: 2023-01-01 | Stop reason: HOSPADM

## 2023-01-01 RX ORDER — EMOLLIENT COMBINATION NO.110
LOTION (ML) TOPICAL EVERY 12 HOURS SCHEDULED
Status: CANCELLED | OUTPATIENT
Start: 2023-01-01

## 2023-01-01 RX ORDER — MAGNESIUM SULFATE HEPTAHYDRATE 40 MG/ML
2 INJECTION, SOLUTION INTRAVENOUS ONCE
Status: COMPLETED | OUTPATIENT
Start: 2023-01-01 | End: 2023-01-01

## 2023-01-01 RX ORDER — SODIUM CHLORIDE 9 MG/ML
INJECTION, SOLUTION INTRAVENOUS
Status: COMPLETED | OUTPATIENT
Start: 2023-01-01 | End: 2023-01-01

## 2023-01-01 RX ORDER — MORPHINE SULFATE 20 MG/ML
10 SOLUTION ORAL
Status: DISCONTINUED | OUTPATIENT
Start: 2023-01-01 | End: 2023-01-01 | Stop reason: HOSPADM

## 2023-01-01 RX ORDER — MORPHINE SULFATE 20 MG/ML
10 SOLUTION ORAL
Status: DISCONTINUED | OUTPATIENT
Start: 2023-01-01 | End: 2023-01-01

## 2023-01-01 RX ORDER — FENTANYL CITRATE 50 UG/ML
INJECTION, SOLUTION INTRAMUSCULAR; INTRAVENOUS
Status: DISCONTINUED | OUTPATIENT
Start: 2023-01-01 | End: 2023-01-01 | Stop reason: HOSPADM

## 2023-01-01 RX ORDER — ACETAMINOPHEN 650 MG/1
650 SUPPOSITORY RECTAL EVERY 4 HOURS PRN
Status: DISCONTINUED | OUTPATIENT
Start: 2023-01-01 | End: 2023-01-01 | Stop reason: HOSPADM

## 2023-01-01 RX ADMIN — Medication: at 09:01

## 2023-01-01 RX ADMIN — AZITHROMYCIN MONOHYDRATE 500 MG: 500 INJECTION, POWDER, LYOPHILIZED, FOR SOLUTION INTRAVENOUS at 02:52

## 2023-01-01 RX ADMIN — Medication 10 ML: at 08:40

## 2023-01-01 RX ADMIN — FUROSEMIDE 40 MG: 10 INJECTION, SOLUTION INTRAMUSCULAR; INTRAVENOUS at 06:46

## 2023-01-01 RX ADMIN — SERTRALINE 100 MG: 100 TABLET, FILM COATED ORAL at 15:42

## 2023-01-01 RX ADMIN — Medication 1 APPLICATION: at 21:40

## 2023-01-01 RX ADMIN — FAMOTIDINE 20 MG: 20 TABLET, FILM COATED ORAL at 09:35

## 2023-01-01 RX ADMIN — TIOTROPIUM BROMIDE INHALATION SPRAY 2 PUFF: 3.12 SPRAY, METERED RESPIRATORY (INHALATION) at 12:07

## 2023-01-01 RX ADMIN — Medication 10 ML: at 09:00

## 2023-01-01 RX ADMIN — Medication 1 APPLICATION: at 20:44

## 2023-01-01 RX ADMIN — ONDANSETRON 4 MG: 2 INJECTION INTRAMUSCULAR; INTRAVENOUS at 06:38

## 2023-01-01 RX ADMIN — Medication 10 ML: at 09:39

## 2023-01-01 RX ADMIN — MORPHINE SULFATE 2 MG: 2 INJECTION, SOLUTION INTRAMUSCULAR; INTRAVENOUS at 17:38

## 2023-01-01 RX ADMIN — POTASSIUM CHLORIDE 10 MEQ: 7.46 INJECTION, SOLUTION INTRAVENOUS at 21:01

## 2023-01-01 RX ADMIN — AMPICILLIN SODIUM AND SULBACTAM SODIUM 3 G: 2; 1 INJECTION, POWDER, FOR SOLUTION INTRAMUSCULAR; INTRAVENOUS at 16:31

## 2023-01-01 RX ADMIN — Medication 10 ML: at 20:20

## 2023-01-01 RX ADMIN — POTASSIUM CHLORIDE 10 MEQ: 7.46 INJECTION, SOLUTION INTRAVENOUS at 17:38

## 2023-01-01 RX ADMIN — LORAZEPAM 0.5 MG: 2 LIQUID ORAL at 14:54

## 2023-01-01 RX ADMIN — Medication: at 15:41

## 2023-01-01 RX ADMIN — Medication 10 ML: at 01:17

## 2023-01-01 RX ADMIN — Medication 3 MG: at 21:46

## 2023-01-01 RX ADMIN — ONDANSETRON 4 MG: 2 INJECTION INTRAMUSCULAR; INTRAVENOUS at 09:34

## 2023-01-01 RX ADMIN — FUROSEMIDE 40 MG: 10 INJECTION, SOLUTION INTRAMUSCULAR; INTRAVENOUS at 21:56

## 2023-01-01 RX ADMIN — PREDNISONE 15 MG: 5 TABLET ORAL at 09:30

## 2023-01-01 RX ADMIN — Medication: at 21:36

## 2023-01-01 RX ADMIN — APIXABAN 5 MG: 5 TABLET, FILM COATED ORAL at 20:43

## 2023-01-01 RX ADMIN — TIOTROPIUM BROMIDE INHALATION SPRAY 2 PUFF: 3.12 SPRAY, METERED RESPIRATORY (INHALATION) at 08:17

## 2023-01-01 RX ADMIN — VANCOMYCIN HYDROCHLORIDE 1000 MG: 1 INJECTION, SOLUTION INTRAVENOUS at 13:14

## 2023-01-01 RX ADMIN — LORAZEPAM 1 MG: 2 LIQUID ORAL at 03:21

## 2023-01-01 RX ADMIN — AMIODARONE HYDROCHLORIDE 200 MG: 200 TABLET ORAL at 09:01

## 2023-01-01 RX ADMIN — PANTOPRAZOLE SODIUM 40 MG: 40 INJECTION, POWDER, FOR SOLUTION INTRAVENOUS at 06:34

## 2023-01-01 RX ADMIN — Medication: at 20:52

## 2023-01-01 RX ADMIN — Medication 1 APPLICATION: at 13:21

## 2023-01-01 RX ADMIN — ONDANSETRON 4 MG: 2 INJECTION INTRAMUSCULAR; INTRAVENOUS at 00:26

## 2023-01-01 RX ADMIN — MORPHINE SULFATE 2 MG: 2 INJECTION, SOLUTION INTRAMUSCULAR; INTRAVENOUS at 00:05

## 2023-01-01 RX ADMIN — FUROSEMIDE 40 MG: 10 INJECTION, SOLUTION INTRAMUSCULAR; INTRAVENOUS at 13:26

## 2023-01-01 RX ADMIN — ASPIRIN 81 MG: 81 TABLET, COATED ORAL at 09:40

## 2023-01-01 RX ADMIN — ANORECTAL OINTMENT 1 APPLICATION: 15.7; .44; 24; 20.6 OINTMENT TOPICAL at 20:14

## 2023-01-01 RX ADMIN — VANCOMYCIN HYDROCHLORIDE 1000 MG: 1 INJECTION, SOLUTION INTRAVENOUS at 16:12

## 2023-01-01 RX ADMIN — AMIODARONE HYDROCHLORIDE 0.5 MG/MIN: 1.8 INJECTION, SOLUTION INTRAVENOUS at 17:43

## 2023-01-01 RX ADMIN — DOCUSATE SODIUM 50MG AND SENNOSIDES 8.6MG 2 TABLET: 8.6; 5 TABLET, FILM COATED ORAL at 09:35

## 2023-01-01 RX ADMIN — NITROGLYCERIN 0.4 MG: 0.4 TABLET SUBLINGUAL at 07:01

## 2023-01-01 RX ADMIN — POTASSIUM CHLORIDE 40 MEQ: 750 TABLET, EXTENDED RELEASE ORAL at 21:57

## 2023-01-01 RX ADMIN — ASPIRIN 81 MG: 81 TABLET, COATED ORAL at 09:37

## 2023-01-01 RX ADMIN — Medication: at 21:44

## 2023-01-01 RX ADMIN — Medication: at 09:31

## 2023-01-01 RX ADMIN — DOCUSATE SODIUM 50MG AND SENNOSIDES 8.6MG 2 TABLET: 8.6; 5 TABLET, FILM COATED ORAL at 21:40

## 2023-01-01 RX ADMIN — PREDNISONE 20 MG: 20 TABLET ORAL at 08:45

## 2023-01-01 RX ADMIN — PREDNISONE 20 MG: 20 TABLET ORAL at 09:34

## 2023-01-01 RX ADMIN — MAGNESIUM SULFATE HEPTAHYDRATE 4 G: 40 INJECTION, SOLUTION INTRAVENOUS at 06:06

## 2023-01-01 RX ADMIN — Medication 10 ML: at 09:31

## 2023-01-01 RX ADMIN — Medication 1 APPLICATION: at 21:55

## 2023-01-01 RX ADMIN — Medication: at 09:36

## 2023-01-01 RX ADMIN — HYDROXYZINE HYDROCHLORIDE 25 MG: 25 TABLET ORAL at 05:34

## 2023-01-01 RX ADMIN — FUROSEMIDE 40 MG: 10 INJECTION, SOLUTION INTRAMUSCULAR; INTRAVENOUS at 06:05

## 2023-01-01 RX ADMIN — HYDROXYZINE HYDROCHLORIDE 25 MG: 25 TABLET ORAL at 20:34

## 2023-01-01 RX ADMIN — PANTOPRAZOLE SODIUM 40 MG: 40 TABLET, DELAYED RELEASE ORAL at 07:09

## 2023-01-01 RX ADMIN — ACETAMINOPHEN 650 MG: 325 TABLET, FILM COATED ORAL at 15:38

## 2023-01-01 RX ADMIN — Medication 3 MG: at 23:27

## 2023-01-01 RX ADMIN — Medication 1 APPLICATION: at 21:44

## 2023-01-01 RX ADMIN — FAMOTIDINE 20 MG: 20 TABLET, FILM COATED ORAL at 08:37

## 2023-01-01 RX ADMIN — Medication: at 10:22

## 2023-01-01 RX ADMIN — PANTOPRAZOLE SODIUM 40 MG: 40 INJECTION, POWDER, FOR SOLUTION INTRAVENOUS at 06:09

## 2023-01-01 RX ADMIN — HYDROMORPHONE HYDROCHLORIDE 0.5 MG: 1 INJECTION, SOLUTION INTRAMUSCULAR; INTRAVENOUS; SUBCUTANEOUS at 00:10

## 2023-01-01 RX ADMIN — Medication 10 ML: at 09:29

## 2023-01-01 RX ADMIN — PREDNISONE 20 MG: 20 TABLET ORAL at 08:37

## 2023-01-01 RX ADMIN — APIXABAN 5 MG: 5 TABLET, FILM COATED ORAL at 08:37

## 2023-01-01 RX ADMIN — MORPHINE SULFATE 2 MG: 2 INJECTION, SOLUTION INTRAMUSCULAR; INTRAVENOUS at 03:42

## 2023-01-01 RX ADMIN — HYDROMORPHONE HYDROCHLORIDE 1 MG: 1 INJECTION, SOLUTION INTRAMUSCULAR; INTRAVENOUS; SUBCUTANEOUS at 14:05

## 2023-01-01 RX ADMIN — ASPIRIN 81 MG: 81 TABLET, COATED ORAL at 08:37

## 2023-01-01 RX ADMIN — Medication: at 20:19

## 2023-01-01 RX ADMIN — Medication 10 ML: at 08:06

## 2023-01-01 RX ADMIN — AMIODARONE HYDROCHLORIDE 0.5 MG/MIN: 1.8 INJECTION, SOLUTION INTRAVENOUS at 05:08

## 2023-01-01 RX ADMIN — LORAZEPAM 0.5 MG: 2 LIQUID ORAL at 20:19

## 2023-01-01 RX ADMIN — Medication: at 14:38

## 2023-01-01 RX ADMIN — Medication: at 09:38

## 2023-01-01 RX ADMIN — SODIUM CHLORIDE 500 ML: 9 INJECTION, SOLUTION INTRAVENOUS at 20:16

## 2023-01-01 RX ADMIN — ONDANSETRON 4 MG: 2 INJECTION INTRAMUSCULAR; INTRAVENOUS at 11:28

## 2023-01-01 RX ADMIN — Medication 10 ML: at 20:34

## 2023-01-01 RX ADMIN — APIXABAN 5 MG: 5 TABLET, FILM COATED ORAL at 09:54

## 2023-01-01 RX ADMIN — PREDNISONE 20 MG: 20 TABLET ORAL at 13:20

## 2023-01-01 RX ADMIN — POTASSIUM CHLORIDE 40 MEQ: 750 TABLET, EXTENDED RELEASE ORAL at 03:47

## 2023-01-01 RX ADMIN — Medication: at 21:28

## 2023-01-01 RX ADMIN — NITROGLYCERIN 0.4 MG: 0.4 TABLET SUBLINGUAL at 11:31

## 2023-01-01 RX ADMIN — AMIODARONE HYDROCHLORIDE 200 MG: 200 TABLET ORAL at 21:35

## 2023-01-01 RX ADMIN — POTASSIUM CHLORIDE 10 MEQ: 7.46 INJECTION, SOLUTION INTRAVENOUS at 16:38

## 2023-01-01 RX ADMIN — AMPICILLIN SODIUM AND SULBACTAM SODIUM 3 G: 2; 1 INJECTION, POWDER, FOR SOLUTION INTRAMUSCULAR; INTRAVENOUS at 21:40

## 2023-01-01 RX ADMIN — Medication 10 ML: at 21:55

## 2023-01-01 RX ADMIN — ASPIRIN 81 MG: 81 TABLET, COATED ORAL at 09:54

## 2023-01-01 RX ADMIN — PANTOPRAZOLE SODIUM 40 MG: 40 TABLET, DELAYED RELEASE ORAL at 06:05

## 2023-01-01 RX ADMIN — PREDNISONE 15 MG: 5 TABLET ORAL at 08:33

## 2023-01-01 RX ADMIN — SERTRALINE 100 MG: 100 TABLET, FILM COATED ORAL at 09:40

## 2023-01-01 RX ADMIN — Medication 1 APPLICATION: at 20:20

## 2023-01-01 RX ADMIN — LORAZEPAM 0.5 MG: 2 LIQUID ORAL at 03:42

## 2023-01-01 RX ADMIN — PANTOPRAZOLE SODIUM 40 MG: 40 INJECTION, POWDER, FOR SOLUTION INTRAVENOUS at 05:00

## 2023-01-01 RX ADMIN — FUROSEMIDE 40 MG: 10 INJECTION, SOLUTION INTRAMUSCULAR; INTRAVENOUS at 14:11

## 2023-01-01 RX ADMIN — HYDROMORPHONE HYDROCHLORIDE 1 MG: 1 INJECTION, SOLUTION INTRAMUSCULAR; INTRAVENOUS; SUBCUTANEOUS at 22:38

## 2023-01-01 RX ADMIN — FUROSEMIDE 40 MG: 10 INJECTION, SOLUTION INTRAMUSCULAR; INTRAVENOUS at 06:00

## 2023-01-01 RX ADMIN — APIXABAN 5 MG: 5 TABLET, FILM COATED ORAL at 09:41

## 2023-01-01 RX ADMIN — VANCOMYCIN HYDROCHLORIDE 750 MG: 750 INJECTION, POWDER, LYOPHILIZED, FOR SOLUTION INTRAVENOUS at 13:10

## 2023-01-01 RX ADMIN — Medication 1 APPLICATION: at 21:59

## 2023-01-01 RX ADMIN — SERTRALINE 100 MG: 100 TABLET, FILM COATED ORAL at 08:33

## 2023-01-01 RX ADMIN — DOCUSATE SODIUM 50MG AND SENNOSIDES 8.6MG 2 TABLET: 8.6; 5 TABLET, FILM COATED ORAL at 08:45

## 2023-01-01 RX ADMIN — HYDROMORPHONE HYDROCHLORIDE 0.5 MG: 1 INJECTION, SOLUTION INTRAMUSCULAR; INTRAVENOUS; SUBCUTANEOUS at 17:54

## 2023-01-01 RX ADMIN — HYDROXYZINE HYDROCHLORIDE 25 MG: 25 TABLET ORAL at 20:52

## 2023-01-01 RX ADMIN — Medication: at 21:59

## 2023-01-01 RX ADMIN — ANORECTAL OINTMENT 1 APPLICATION: 15.7; .44; 24; 20.6 OINTMENT TOPICAL at 21:36

## 2023-01-01 RX ADMIN — Medication: at 20:37

## 2023-01-01 RX ADMIN — TIOTROPIUM BROMIDE INHALATION SPRAY 2 PUFF: 3.12 SPRAY, METERED RESPIRATORY (INHALATION) at 08:04

## 2023-01-01 RX ADMIN — SERTRALINE 100 MG: 100 TABLET, FILM COATED ORAL at 08:45

## 2023-01-01 RX ADMIN — HYDROXYZINE HYDROCHLORIDE 25 MG: 25 TABLET ORAL at 23:33

## 2023-01-01 RX ADMIN — HYDROXYZINE HYDROCHLORIDE 25 MG: 25 TABLET ORAL at 20:35

## 2023-01-01 RX ADMIN — ASPIRIN 81 MG: 81 TABLET, COATED ORAL at 15:39

## 2023-01-01 RX ADMIN — PREDNISONE 15 MG: 5 TABLET ORAL at 09:01

## 2023-01-01 RX ADMIN — VANCOMYCIN HYDROCHLORIDE 1000 MG: 1 INJECTION, SOLUTION INTRAVENOUS at 11:05

## 2023-01-01 RX ADMIN — VANCOMYCIN HYDROCHLORIDE 1000 MG: 1 INJECTION, SOLUTION INTRAVENOUS at 11:53

## 2023-01-01 RX ADMIN — HYDROCORTISONE SODIUM SUCCINATE 50 MG: 100 INJECTION, POWDER, FOR SOLUTION INTRAMUSCULAR; INTRAVENOUS at 18:16

## 2023-01-01 RX ADMIN — METOPROLOL TARTRATE 5 MG: 1 INJECTION, SOLUTION INTRAVENOUS at 06:07

## 2023-01-01 RX ADMIN — Medication 1 APPLICATION: at 08:35

## 2023-01-01 RX ADMIN — Medication: at 21:40

## 2023-01-01 RX ADMIN — LORAZEPAM 0.5 MG: 2 LIQUID ORAL at 20:43

## 2023-01-01 RX ADMIN — Medication 1 APPLICATION: at 08:45

## 2023-01-01 RX ADMIN — DOCUSATE SODIUM 50MG AND SENNOSIDES 8.6MG 2 TABLET: 8.6; 5 TABLET, FILM COATED ORAL at 15:38

## 2023-01-01 RX ADMIN — FUROSEMIDE 40 MG: 10 INJECTION, SOLUTION INTRAMUSCULAR; INTRAVENOUS at 21:35

## 2023-01-01 RX ADMIN — HYDROMORPHONE HYDROCHLORIDE 1 MG: 1 INJECTION, SOLUTION INTRAMUSCULAR; INTRAVENOUS; SUBCUTANEOUS at 01:17

## 2023-01-01 RX ADMIN — PREDNISONE 15 MG: 5 TABLET ORAL at 09:54

## 2023-01-01 RX ADMIN — LORAZEPAM 1 MG: 2 LIQUID ORAL at 08:40

## 2023-01-01 RX ADMIN — Medication 10 ML: at 08:35

## 2023-01-01 RX ADMIN — POTASSIUM CHLORIDE 10 MEQ: 7.46 INJECTION, SOLUTION INTRAVENOUS at 18:41

## 2023-01-01 RX ADMIN — APIXABAN 5 MG: 5 TABLET, FILM COATED ORAL at 20:52

## 2023-01-01 RX ADMIN — TIOTROPIUM BROMIDE INHALATION SPRAY 2 PUFF: 3.12 SPRAY, METERED RESPIRATORY (INHALATION) at 07:12

## 2023-01-01 RX ADMIN — APIXABAN 5 MG: 5 TABLET, FILM COATED ORAL at 21:40

## 2023-01-01 RX ADMIN — MORPHINE SULFATE 2 MG: 2 INJECTION, SOLUTION INTRAMUSCULAR; INTRAVENOUS at 23:32

## 2023-01-01 RX ADMIN — Medication 1 APPLICATION: at 20:28

## 2023-01-01 RX ADMIN — PANTOPRAZOLE SODIUM 40 MG: 40 TABLET, DELAYED RELEASE ORAL at 06:46

## 2023-01-01 RX ADMIN — PREDNISONE 15 MG: 5 TABLET ORAL at 15:39

## 2023-01-01 RX ADMIN — FAMOTIDINE 20 MG: 20 TABLET, FILM COATED ORAL at 13:19

## 2023-01-01 RX ADMIN — DIGOXIN 250 MCG: 0.25 INJECTION INTRAMUSCULAR; INTRAVENOUS at 05:30

## 2023-01-01 RX ADMIN — ASPIRIN 81 MG: 81 TABLET, COATED ORAL at 08:45

## 2023-01-01 RX ADMIN — TIOTROPIUM BROMIDE INHALATION SPRAY 2 PUFF: 3.12 SPRAY, METERED RESPIRATORY (INHALATION) at 09:35

## 2023-01-01 RX ADMIN — FUROSEMIDE 40 MG: 10 INJECTION, SOLUTION INTRAMUSCULAR; INTRAVENOUS at 18:22

## 2023-01-01 RX ADMIN — LORAZEPAM 1 MG: 2 LIQUID ORAL at 17:56

## 2023-01-01 RX ADMIN — AMPICILLIN SODIUM AND SULBACTAM SODIUM 3 G: 2; 1 INJECTION, POWDER, FOR SOLUTION INTRAMUSCULAR; INTRAVENOUS at 03:00

## 2023-01-01 RX ADMIN — AMIODARONE HYDROCHLORIDE 150 MG: 1.5 INJECTION, SOLUTION INTRAVENOUS at 11:09

## 2023-01-01 RX ADMIN — SERTRALINE 100 MG: 100 TABLET, FILM COATED ORAL at 09:30

## 2023-01-01 RX ADMIN — HYDROXYZINE HYDROCHLORIDE 25 MG: 25 TABLET ORAL at 17:22

## 2023-01-01 RX ADMIN — ASPIRIN 81 MG: 81 TABLET, COATED ORAL at 09:35

## 2023-01-01 RX ADMIN — SERTRALINE 100 MG: 100 TABLET, FILM COATED ORAL at 09:55

## 2023-01-01 RX ADMIN — PREDNISONE 15 MG: 5 TABLET ORAL at 09:28

## 2023-01-01 RX ADMIN — DOCUSATE SODIUM 50MG AND SENNOSIDES 8.6MG 2 TABLET: 8.6; 5 TABLET, FILM COATED ORAL at 08:37

## 2023-01-01 RX ADMIN — ANORECTAL OINTMENT 1 APPLICATION: 15.7; .44; 24; 20.6 OINTMENT TOPICAL at 21:20

## 2023-01-01 RX ADMIN — MORPHINE SULFATE 2 MG: 2 INJECTION, SOLUTION INTRAMUSCULAR; INTRAVENOUS at 08:25

## 2023-01-01 RX ADMIN — APIXABAN 5 MG: 5 TABLET, FILM COATED ORAL at 09:35

## 2023-01-01 RX ADMIN — METOPROLOL SUCCINATE 25 MG: 25 TABLET, EXTENDED RELEASE ORAL at 15:38

## 2023-01-01 RX ADMIN — APIXABAN 2.5 MG: 2.5 TABLET, FILM COATED ORAL at 09:01

## 2023-01-01 RX ADMIN — AMPICILLIN SODIUM AND SULBACTAM SODIUM 3 G: 2; 1 INJECTION, POWDER, FOR SOLUTION INTRAMUSCULAR; INTRAVENOUS at 02:53

## 2023-01-01 RX ADMIN — HYDROMORPHONE HYDROCHLORIDE 0.5 MG: 1 INJECTION, SOLUTION INTRAMUSCULAR; INTRAVENOUS; SUBCUTANEOUS at 20:15

## 2023-01-01 RX ADMIN — LORAZEPAM 0.5 MG: 2 LIQUID ORAL at 11:04

## 2023-01-01 RX ADMIN — LORAZEPAM 0.25 MG: 0.5 TABLET ORAL at 21:55

## 2023-01-01 RX ADMIN — PANTOPRAZOLE SODIUM 40 MG: 40 TABLET, DELAYED RELEASE ORAL at 15:41

## 2023-01-01 RX ADMIN — PROCHLORPERAZINE EDISYLATE 10 MG: 5 INJECTION INTRAMUSCULAR; INTRAVENOUS at 02:26

## 2023-01-01 RX ADMIN — FUROSEMIDE 40 MG: 10 INJECTION, SOLUTION INTRAMUSCULAR; INTRAVENOUS at 07:09

## 2023-01-01 RX ADMIN — Medication: at 16:32

## 2023-01-01 RX ADMIN — Medication 3 MG: at 20:52

## 2023-01-01 RX ADMIN — Medication 1 APPLICATION: at 09:36

## 2023-01-01 RX ADMIN — HYDROXYZINE HYDROCHLORIDE 25 MG: 25 TABLET ORAL at 15:34

## 2023-01-01 RX ADMIN — APIXABAN 5 MG: 5 TABLET, FILM COATED ORAL at 20:51

## 2023-01-01 RX ADMIN — HYDROXYZINE HYDROCHLORIDE 25 MG: 25 TABLET ORAL at 04:57

## 2023-01-01 RX ADMIN — Medication: at 08:45

## 2023-01-01 RX ADMIN — APIXABAN 2.5 MG: 2.5 TABLET, FILM COATED ORAL at 21:35

## 2023-01-01 RX ADMIN — Medication: at 08:38

## 2023-01-01 RX ADMIN — Medication 10 ML: at 20:15

## 2023-01-01 RX ADMIN — ANORECTAL OINTMENT 1 APPLICATION: 15.7; .44; 24; 20.6 OINTMENT TOPICAL at 20:37

## 2023-01-01 RX ADMIN — VANCOMYCIN HYDROCHLORIDE 1000 MG: 1 INJECTION, SOLUTION INTRAVENOUS at 09:41

## 2023-01-01 RX ADMIN — APIXABAN 5 MG: 5 TABLET, FILM COATED ORAL at 21:56

## 2023-01-01 RX ADMIN — Medication: at 21:11

## 2023-01-01 RX ADMIN — TIOTROPIUM BROMIDE INHALATION SPRAY 2 PUFF: 3.12 SPRAY, METERED RESPIRATORY (INHALATION) at 10:29

## 2023-01-01 RX ADMIN — HYDROXYZINE HYDROCHLORIDE 25 MG: 25 TABLET ORAL at 21:56

## 2023-01-01 RX ADMIN — ANORECTAL OINTMENT 1 APPLICATION: 15.7; .44; 24; 20.6 OINTMENT TOPICAL at 09:31

## 2023-01-01 RX ADMIN — Medication 10 ML: at 21:40

## 2023-01-01 RX ADMIN — PREDNISONE 20 MG: 20 TABLET ORAL at 09:41

## 2023-01-01 RX ADMIN — Medication 1 APPLICATION: at 14:38

## 2023-01-01 RX ADMIN — FUROSEMIDE 40 MG: 10 INJECTION, SOLUTION INTRAMUSCULAR; INTRAVENOUS at 14:48

## 2023-01-01 RX ADMIN — POTASSIUM CHLORIDE 10 MEQ: 7.46 INJECTION, SOLUTION INTRAVENOUS at 15:20

## 2023-01-01 RX ADMIN — LORAZEPAM 0.5 MG: 2 LIQUID ORAL at 00:09

## 2023-01-01 RX ADMIN — ASPIRIN 81 MG: 81 TABLET, COATED ORAL at 08:33

## 2023-01-01 RX ADMIN — LORAZEPAM 1 MG: 2 LIQUID ORAL at 01:16

## 2023-01-01 RX ADMIN — Medication 1 APPLICATION: at 20:52

## 2023-01-01 RX ADMIN — Medication 10 ML: at 09:56

## 2023-01-01 RX ADMIN — HYDROXYZINE HYDROCHLORIDE 25 MG: 25 TABLET, FILM COATED ORAL at 23:49

## 2023-01-01 RX ADMIN — HYDROXYZINE HYDROCHLORIDE 25 MG: 25 TABLET ORAL at 03:51

## 2023-01-01 RX ADMIN — SERTRALINE 100 MG: 100 TABLET, FILM COATED ORAL at 09:35

## 2023-01-01 RX ADMIN — POTASSIUM CHLORIDE 10 MEQ: 7.46 INJECTION, SOLUTION INTRAVENOUS at 19:46

## 2023-01-01 RX ADMIN — LORAZEPAM 1 MG: 2 LIQUID ORAL at 02:08

## 2023-01-01 RX ADMIN — ONDANSETRON 4 MG: 2 INJECTION INTRAMUSCULAR; INTRAVENOUS at 15:14

## 2023-01-01 RX ADMIN — HYDROXYZINE HYDROCHLORIDE 25 MG: 25 TABLET ORAL at 20:26

## 2023-01-01 RX ADMIN — Medication: at 09:56

## 2023-01-01 RX ADMIN — Medication: at 20:43

## 2023-01-01 RX ADMIN — ASPIRIN 81 MG: 81 TABLET, COATED ORAL at 13:19

## 2023-01-01 RX ADMIN — AMIODARONE HYDROCHLORIDE 200 MG: 200 TABLET ORAL at 14:49

## 2023-01-01 RX ADMIN — Medication: at 20:54

## 2023-01-01 RX ADMIN — LORAZEPAM 0.5 MG: 2 LIQUID ORAL at 04:53

## 2023-01-01 RX ADMIN — APIXABAN 5 MG: 5 TABLET, FILM COATED ORAL at 21:44

## 2023-01-01 RX ADMIN — Medication 10 ML: at 08:39

## 2023-01-01 RX ADMIN — FUROSEMIDE 40 MG: 10 INJECTION, SOLUTION INTRAMUSCULAR; INTRAVENOUS at 21:12

## 2023-01-01 RX ADMIN — ANORECTAL OINTMENT 1 APPLICATION: 15.7; .44; 24; 20.6 OINTMENT TOPICAL at 10:21

## 2023-01-01 RX ADMIN — AMPICILLIN SODIUM AND SULBACTAM SODIUM 3 G: 2; 1 INJECTION, POWDER, FOR SOLUTION INTRAMUSCULAR; INTRAVENOUS at 09:23

## 2023-01-01 RX ADMIN — Medication 10 ML: at 09:02

## 2023-01-01 RX ADMIN — APIXABAN 5 MG: 5 TABLET, FILM COATED ORAL at 08:45

## 2023-01-01 RX ADMIN — HYDROMORPHONE HYDROCHLORIDE 0.5 MG: 1 INJECTION, SOLUTION INTRAMUSCULAR; INTRAVENOUS; SUBCUTANEOUS at 03:50

## 2023-01-01 RX ADMIN — Medication: at 08:07

## 2023-01-01 RX ADMIN — APIXABAN 5 MG: 5 TABLET, FILM COATED ORAL at 08:35

## 2023-01-01 RX ADMIN — Medication: at 20:14

## 2023-01-01 RX ADMIN — Medication 1 APPLICATION: at 20:54

## 2023-01-01 RX ADMIN — DOCUSATE SODIUM 50MG AND SENNOSIDES 8.6MG 2 TABLET: 8.6; 5 TABLET, FILM COATED ORAL at 09:01

## 2023-01-01 RX ADMIN — Medication 1 APPLICATION: at 21:11

## 2023-01-01 RX ADMIN — LORAZEPAM 0.5 MG: 2 LIQUID ORAL at 14:38

## 2023-01-01 RX ADMIN — APIXABAN 5 MG: 5 TABLET, FILM COATED ORAL at 12:00

## 2023-01-01 RX ADMIN — LORAZEPAM 1 MG: 2 LIQUID ORAL at 05:10

## 2023-01-01 RX ADMIN — Medication 10 ML: at 20:38

## 2023-01-01 RX ADMIN — MORPHINE SULFATE 2 MG: 2 INJECTION, SOLUTION INTRAMUSCULAR; INTRAVENOUS at 22:35

## 2023-01-01 RX ADMIN — AMIODARONE HYDROCHLORIDE 1 MG/MIN: 1.8 INJECTION, SOLUTION INTRAVENOUS at 11:24

## 2023-01-01 RX ADMIN — Medication: at 21:55

## 2023-01-01 RX ADMIN — VANCOMYCIN HYDROCHLORIDE 1250 MG: 10 INJECTION, POWDER, LYOPHILIZED, FOR SOLUTION INTRAVENOUS at 11:26

## 2023-01-01 RX ADMIN — MORPHINE SULFATE 2 MG: 2 INJECTION, SOLUTION INTRAMUSCULAR; INTRAVENOUS at 00:26

## 2023-01-01 RX ADMIN — TIOTROPIUM BROMIDE INHALATION SPRAY 2 PUFF: 3.12 SPRAY, METERED RESPIRATORY (INHALATION) at 09:43

## 2023-01-01 RX ADMIN — SERTRALINE 100 MG: 100 TABLET, FILM COATED ORAL at 08:37

## 2023-01-01 RX ADMIN — IOPAMIDOL 95 ML: 755 INJECTION, SOLUTION INTRAVENOUS at 00:33

## 2023-01-01 RX ADMIN — MORPHINE SULFATE 2 MG: 2 INJECTION, SOLUTION INTRAMUSCULAR; INTRAVENOUS at 10:16

## 2023-01-01 RX ADMIN — VANCOMYCIN HYDROCHLORIDE 1000 MG: 1 INJECTION, SOLUTION INTRAVENOUS at 10:35

## 2023-01-01 RX ADMIN — METOPROLOL SUCCINATE 25 MG: 25 TABLET, EXTENDED RELEASE ORAL at 08:37

## 2023-01-01 RX ADMIN — LORAZEPAM 0.5 MG: 2 LIQUID ORAL at 18:47

## 2023-01-01 RX ADMIN — FUROSEMIDE 40 MG: 10 INJECTION, SOLUTION INTRAMUSCULAR; INTRAVENOUS at 01:14

## 2023-01-01 RX ADMIN — NITROGLYCERIN 0.4 MG: 0.4 TABLET SUBLINGUAL at 11:15

## 2023-01-01 RX ADMIN — SODIUM CHLORIDE 1000 ML: 9 INJECTION, SOLUTION INTRAVENOUS at 08:42

## 2023-01-01 RX ADMIN — Medication 3 MG: at 20:34

## 2023-01-01 RX ADMIN — LORAZEPAM 1 MG: 2 LIQUID ORAL at 14:05

## 2023-01-01 RX ADMIN — HYDROMORPHONE HYDROCHLORIDE 1 MG: 1 INJECTION, SOLUTION INTRAMUSCULAR; INTRAVENOUS; SUBCUTANEOUS at 08:40

## 2023-01-01 RX ADMIN — ANORECTAL OINTMENT 1 APPLICATION: 15.7; .44; 24; 20.6 OINTMENT TOPICAL at 21:29

## 2023-01-01 RX ADMIN — Medication 1 APPLICATION: at 15:45

## 2023-01-01 RX ADMIN — Medication 1 APPLICATION: at 21:35

## 2023-01-01 RX ADMIN — METOPROLOL TARTRATE 5 MG: 1 INJECTION, SOLUTION INTRAVENOUS at 16:29

## 2023-01-01 RX ADMIN — TIOTROPIUM BROMIDE INHALATION SPRAY 2 PUFF: 3.12 SPRAY, METERED RESPIRATORY (INHALATION) at 07:29

## 2023-01-01 RX ADMIN — HYDROMORPHONE HYDROCHLORIDE 1 MG: 1 INJECTION, SOLUTION INTRAMUSCULAR; INTRAVENOUS; SUBCUTANEOUS at 05:10

## 2023-01-01 RX ADMIN — Medication 1 APPLICATION: at 09:56

## 2023-01-01 RX ADMIN — Medication 1 APPLICATION: at 09:38

## 2023-01-01 RX ADMIN — HYDROXYZINE HYDROCHLORIDE 25 MG: 25 TABLET ORAL at 13:47

## 2023-01-01 RX ADMIN — HYDROMORPHONE HYDROCHLORIDE 1 MG: 1 INJECTION, SOLUTION INTRAMUSCULAR; INTRAVENOUS; SUBCUTANEOUS at 21:20

## 2023-01-01 RX ADMIN — SERTRALINE 100 MG: 100 TABLET, FILM COATED ORAL at 09:01

## 2023-01-01 RX ADMIN — ONDANSETRON 4 MG: 2 INJECTION INTRAMUSCULAR; INTRAVENOUS at 09:18

## 2023-01-01 RX ADMIN — LORAZEPAM 1 MG: 2 LIQUID ORAL at 21:20

## 2023-01-01 RX ADMIN — Medication 1 APPLICATION: at 09:41

## 2023-01-01 RX ADMIN — PANTOPRAZOLE SODIUM 40 MG: 40 TABLET, DELAYED RELEASE ORAL at 06:00

## 2023-01-01 RX ADMIN — Medication: at 09:41

## 2023-01-01 RX ADMIN — MAGNESIUM SULFATE HEPTAHYDRATE 2 G: 2 INJECTION, SOLUTION INTRAVENOUS at 13:24

## 2023-01-01 RX ADMIN — HYDROMORPHONE HYDROCHLORIDE 1 MG: 1 INJECTION, SOLUTION INTRAMUSCULAR; INTRAVENOUS; SUBCUTANEOUS at 17:56

## 2023-01-01 RX ADMIN — Medication: at 22:03

## 2023-01-01 RX ADMIN — HYDROXYZINE HYDROCHLORIDE 25 MG: 25 TABLET ORAL at 09:34

## 2023-01-01 RX ADMIN — Medication 1 APPLICATION: at 08:38

## 2023-01-01 RX ADMIN — VANCOMYCIN HYDROCHLORIDE 1000 MG: 1 INJECTION, SOLUTION INTRAVENOUS at 11:31

## 2023-01-01 RX ADMIN — Medication 10 ML: at 21:57

## 2023-01-01 RX ADMIN — Medication 10 ML: at 21:11

## 2023-01-01 RX ADMIN — AMPICILLIN SODIUM AND SULBACTAM SODIUM 3 G: 2; 1 INJECTION, POWDER, FOR SOLUTION INTRAMUSCULAR; INTRAVENOUS at 03:59

## 2023-01-01 RX ADMIN — AMPICILLIN SODIUM AND SULBACTAM SODIUM 3 G: 2; 1 INJECTION, POWDER, FOR SOLUTION INTRAMUSCULAR; INTRAVENOUS at 20:43

## 2023-01-01 RX ADMIN — HYDROXYZINE HYDROCHLORIDE 25 MG: 25 TABLET ORAL at 20:51

## 2023-01-01 RX ADMIN — DOCUSATE SODIUM 50MG AND SENNOSIDES 8.6MG 2 TABLET: 8.6; 5 TABLET, FILM COATED ORAL at 21:56

## 2023-01-01 RX ADMIN — Medication 10 ML: at 21:29

## 2023-01-01 RX ADMIN — ASPIRIN 81 MG: 81 TABLET, COATED ORAL at 09:01

## 2023-01-01 RX ADMIN — AMPICILLIN SODIUM AND SULBACTAM SODIUM 3 G: 2; 1 INJECTION, POWDER, FOR SOLUTION INTRAMUSCULAR; INTRAVENOUS at 09:34

## 2023-01-01 RX ADMIN — DIGOXIN 500 MCG: 0.25 INJECTION INTRAMUSCULAR; INTRAVENOUS at 10:32

## 2023-01-01 RX ADMIN — AMPICILLIN SODIUM AND SULBACTAM SODIUM 3 G: 2; 1 INJECTION, POWDER, FOR SOLUTION INTRAMUSCULAR; INTRAVENOUS at 15:34

## 2023-01-01 RX ADMIN — AMPICILLIN SODIUM AND SULBACTAM SODIUM 3 G: 2; 1 INJECTION, POWDER, FOR SOLUTION INTRAMUSCULAR; INTRAVENOUS at 08:38

## 2023-01-01 RX ADMIN — LORAZEPAM 0.25 MG: 0.5 TABLET ORAL at 11:08

## 2023-01-01 RX ADMIN — METOPROLOL SUCCINATE 25 MG: 25 TABLET, EXTENDED RELEASE ORAL at 09:35

## 2023-01-01 RX ADMIN — Medication 1 APPLICATION: at 09:02

## 2023-01-01 RX ADMIN — METOPROLOL SUCCINATE 25 MG: 25 TABLET, EXTENDED RELEASE ORAL at 13:20

## 2023-01-01 RX ADMIN — POTASSIUM CHLORIDE 40 MEQ: 750 TABLET, EXTENDED RELEASE ORAL at 09:35

## 2023-01-01 RX ADMIN — Medication 10 ML: at 10:22

## 2023-01-01 RX ADMIN — Medication 10 ML: at 21:36

## 2023-01-01 RX ADMIN — SERTRALINE 100 MG: 100 TABLET, FILM COATED ORAL at 13:20

## 2023-01-01 RX ADMIN — HYDROMORPHONE HYDROCHLORIDE 0.5 MG: 1 INJECTION, SOLUTION INTRAMUSCULAR; INTRAVENOUS; SUBCUTANEOUS at 03:25

## 2023-01-01 RX ADMIN — PANTOPRAZOLE SODIUM 40 MG: 40 INJECTION, POWDER, FOR SOLUTION INTRAVENOUS at 11:15

## 2023-01-01 RX ADMIN — METOPROLOL SUCCINATE 25 MG: 25 TABLET, EXTENDED RELEASE ORAL at 09:01

## 2023-01-01 RX ADMIN — POTASSIUM CHLORIDE 40 MEQ: 750 TABLET, EXTENDED RELEASE ORAL at 16:22

## 2023-01-01 RX ADMIN — LORAZEPAM 1 MG: 2 LIQUID ORAL at 20:15

## 2023-01-01 RX ADMIN — Medication: at 08:35

## 2023-01-01 RX ADMIN — METOPROLOL SUCCINATE 25 MG: 25 TABLET, EXTENDED RELEASE ORAL at 09:05

## 2023-01-01 RX ADMIN — METOPROLOL SUCCINATE 25 MG: 25 TABLET, EXTENDED RELEASE ORAL at 09:40

## 2023-01-01 RX ADMIN — ANORECTAL OINTMENT 1 APPLICATION: 15.7; .44; 24; 20.6 OINTMENT TOPICAL at 08:07

## 2023-01-01 RX ADMIN — DOCUSATE SODIUM 50MG AND SENNOSIDES 8.6MG 2 TABLET: 8.6; 5 TABLET, FILM COATED ORAL at 20:52

## 2023-01-01 RX ADMIN — SODIUM CHLORIDE 1000 ML: 9 INJECTION, SOLUTION INTRAVENOUS at 09:17

## 2023-01-01 RX ADMIN — METOPROLOL SUCCINATE 25 MG: 25 TABLET, EXTENDED RELEASE ORAL at 08:45

## 2023-01-01 RX ADMIN — SODIUM CHLORIDE 250 ML: 9 INJECTION, SOLUTION INTRAVENOUS at 15:42

## 2023-01-01 RX ADMIN — DOCUSATE SODIUM 50MG AND SENNOSIDES 8.6MG 2 TABLET: 8.6; 5 TABLET, FILM COATED ORAL at 08:35

## 2023-01-01 RX ADMIN — Medication: at 20:28

## 2023-01-16 ENCOUNTER — SPECIALTY PHARMACY (OUTPATIENT)
Dept: PHARMACY | Facility: HOSPITAL | Age: 83
End: 2023-01-16
Payer: MEDICARE

## 2023-01-16 NOTE — PROGRESS NOTES
Specialty Pharmacy Refill Coordination Note     Catherine is a 82 y.o. female contacted today regarding refills of Jakafi specialty medication(s).    Reviewed and verified with patient:       Specialty medication(s) and dose(s) confirmed: yes  Jakafi 15 mg twice per day    Refill Questions    Flowsheet Row Most Recent Value   Changes to allergies? No   Changes to medications? No   New conditions since last clinic visit No   Unplanned office visit, urgent care, ED, or hospital admission in the last 4 weeks  No   How does patient/caregiver feel medication is working? Good   Financial problems or insurance changes  No   Since the previous refill, were any specialty medication doses or scheduled injections missed or delayed?  No   Does this patient require a clinical escalation to a pharmacist? No          Delivery Questions    Flowsheet Row Most Recent Value   Delivery method Other (Comment)  [[employer] to pts home on 1/17-$0 copay with [employer]-Address Confirmed]   Delivery address correct? Yes  [Ship to home address]   Delivery phone number [phone]   Preferred delivery time? AM   Number of medications in delivery 1   Medication being filled and delivered Jakafi   Doses left of specialty medications A couple days   Is there any medication that is due not being filled? No   Supplies needed? No supplies needed   Cooler needed? No   Do any medications need mixed or dated? No   Copay form of payment Payment plan already set up   Additional comments $0 copay with Fi[employer]   Questions or concerns for the pharmacist? No   Explain any questions or concerns for the pharmacist N/A   Are any medications first time fills? No        Jakafi delivery coordinated with Karen for 1/17/2023 to pts home address. $0 copay with Fi[employer]. Her last delivery was on 12/7/22. No questions or concerns to report to MTM Team today.     Follow-up: 21 day(s)     Isi Mao  Specialty Pharmacy Technician

## 2023-01-23 ENCOUNTER — HOSPITAL ENCOUNTER (INPATIENT)
Facility: HOSPITAL | Age: 83
LOS: 3 days | Discharge: HOME HEALTH | DRG: 392 | End: 2023-01-26
Attending: EMERGENCY MEDICINE | Admitting: HOSPITALIST
Payer: MEDICARE

## 2023-01-23 ENCOUNTER — APPOINTMENT (OUTPATIENT)
Dept: CT IMAGING | Facility: HOSPITAL | Age: 83
DRG: 392 | End: 2023-01-23
Payer: MEDICARE

## 2023-01-23 DIAGNOSIS — R10.13 EPIGASTRIC ABDOMINAL PAIN: Primary | ICD-10-CM

## 2023-01-23 DIAGNOSIS — R11.2 NAUSEA AND VOMITING, UNSPECIFIED VOMITING TYPE: ICD-10-CM

## 2023-01-23 DIAGNOSIS — N39.0 URINARY TRACT INFECTION WITHOUT HEMATURIA, SITE UNSPECIFIED: ICD-10-CM

## 2023-01-23 DIAGNOSIS — I50.32 CHRONIC DIASTOLIC (CONGESTIVE) HEART FAILURE: ICD-10-CM

## 2023-01-23 DIAGNOSIS — R53.1 WEAKNESS GENERALIZED: ICD-10-CM

## 2023-01-23 DIAGNOSIS — R10.9 ABDOMINAL PAIN, UNSPECIFIED ABDOMINAL LOCATION: ICD-10-CM

## 2023-01-23 LAB
ALBUMIN SERPL-MCNC: 3.7 G/DL (ref 3.5–5.2)
ALBUMIN/GLOB SERPL: 1.2 G/DL
ALP SERPL-CCNC: 210 U/L (ref 39–117)
ALT SERPL W P-5'-P-CCNC: 25 U/L (ref 1–33)
ANION GAP SERPL CALCULATED.3IONS-SCNC: 11.2 MMOL/L (ref 5–15)
ANION GAP SERPL CALCULATED.3IONS-SCNC: 9.4 MMOL/L (ref 5–15)
ANISOCYTOSIS BLD QL: ABNORMAL
AST SERPL-CCNC: 19 U/L (ref 1–32)
BACTERIA UR QL AUTO: ABNORMAL /HPF
BASOPHILS # BLD MANUAL: 0.34 10*3/MM3 (ref 0–0.2)
BASOPHILS NFR BLD MANUAL: 1 % (ref 0–1.5)
BILIRUB SERPL-MCNC: 0.5 MG/DL (ref 0–1.2)
BILIRUB UR QL STRIP: NEGATIVE
BUN SERPL-MCNC: 19 MG/DL (ref 8–23)
BUN SERPL-MCNC: 21 MG/DL (ref 8–23)
BUN/CREAT SERPL: 21.9 (ref 7–25)
BUN/CREAT SERPL: 22.6 (ref 7–25)
CALCIUM SPEC-SCNC: 8.3 MG/DL (ref 8.6–10.5)
CALCIUM SPEC-SCNC: 8.9 MG/DL (ref 8.6–10.5)
CHLORIDE SERPL-SCNC: 100 MMOL/L (ref 98–107)
CHLORIDE SERPL-SCNC: 101 MMOL/L (ref 98–107)
CLARITY UR: CLEAR
CO2 SERPL-SCNC: 25.8 MMOL/L (ref 22–29)
CO2 SERPL-SCNC: 26.6 MMOL/L (ref 22–29)
COLOR UR: YELLOW
CREAT SERPL-MCNC: 0.84 MG/DL (ref 0.57–1)
CREAT SERPL-MCNC: 0.96 MG/DL (ref 0.57–1)
D-LACTATE SERPL-SCNC: 1.8 MMOL/L (ref 0.5–2)
DEPRECATED RDW RBC AUTO: 53.6 FL (ref 37–54)
DEPRECATED RDW RBC AUTO: 56.7 FL (ref 37–54)
EGFRCR SERPLBLD CKD-EPI 2021: 58.5 ML/MIN/1.73
EGFRCR SERPLBLD CKD-EPI 2021: 68.6 ML/MIN/1.73
EOSINOPHIL # BLD MANUAL: 0.34 10*3/MM3 (ref 0–0.4)
EOSINOPHIL NFR BLD MANUAL: 1 % (ref 0.3–6.2)
ERYTHROCYTE [DISTWIDTH] IN BLOOD BY AUTOMATED COUNT: 15.6 % (ref 12.3–15.4)
ERYTHROCYTE [DISTWIDTH] IN BLOOD BY AUTOMATED COUNT: 15.6 % (ref 12.3–15.4)
GLOBULIN UR ELPH-MCNC: 3 GM/DL
GLUCOSE BLDC GLUCOMTR-MCNC: 104 MG/DL (ref 70–130)
GLUCOSE BLDC GLUCOMTR-MCNC: 139 MG/DL (ref 70–130)
GLUCOSE BLDC GLUCOMTR-MCNC: 180 MG/DL (ref 70–130)
GLUCOSE SERPL-MCNC: 155 MG/DL (ref 65–99)
GLUCOSE SERPL-MCNC: 176 MG/DL (ref 65–99)
GLUCOSE UR STRIP-MCNC: ABNORMAL MG/DL
HCT VFR BLD AUTO: 25.1 % (ref 34–46.6)
HCT VFR BLD AUTO: 25.8 % (ref 34–46.6)
HGB BLD-MCNC: 8.2 G/DL (ref 12–15.9)
HGB BLD-MCNC: 8.5 G/DL (ref 12–15.9)
HGB UR QL STRIP.AUTO: NEGATIVE
HYALINE CASTS UR QL AUTO: ABNORMAL /LPF
KETONES UR QL STRIP: ABNORMAL
LEUKOCYTE ESTERASE UR QL STRIP.AUTO: ABNORMAL
LIPASE SERPL-CCNC: 13 U/L (ref 13–60)
LYMPHOCYTES # BLD MANUAL: 2.11 10*3/MM3 (ref 0.7–3.1)
LYMPHOCYTES # BLD MANUAL: 2.77 10*3/MM3 (ref 0.7–3.1)
LYMPHOCYTES NFR BLD MANUAL: 4.1 % (ref 5–12)
LYMPHOCYTES NFR BLD MANUAL: 7.2 % (ref 5–12)
MACROCYTES BLD QL SMEAR: ABNORMAL
MCH RBC QN AUTO: 31.8 PG (ref 26.6–33)
MCH RBC QN AUTO: 33.1 PG (ref 26.6–33)
MCHC RBC AUTO-ENTMCNC: 32.7 G/DL (ref 31.5–35.7)
MCHC RBC AUTO-ENTMCNC: 32.9 G/DL (ref 31.5–35.7)
MCV RBC AUTO: 100.4 FL (ref 79–97)
MCV RBC AUTO: 97.3 FL (ref 79–97)
METAMYELOCYTES NFR BLD MANUAL: 1 % (ref 0–0)
MONOCYTES # BLD: 1.42 10*3/MM3 (ref 0.1–0.9)
MONOCYTES # BLD: 2.43 10*3/MM3 (ref 0.1–0.9)
NEUTROPHILS # BLD AUTO: 27.89 10*3/MM3 (ref 1.7–7)
NEUTROPHILS # BLD AUTO: 30.78 10*3/MM3 (ref 1.7–7)
NEUTROPHILS NFR BLD MANUAL: 82.5 % (ref 42.7–76)
NEUTROPHILS NFR BLD MANUAL: 88.8 % (ref 42.7–76)
NITRITE UR QL STRIP: NEGATIVE
PH UR STRIP.AUTO: 5.5 [PH] (ref 5–8)
PLAT MORPH BLD: NORMAL
PLAT MORPH BLD: NORMAL
PLATELET # BLD AUTO: 215 10*3/MM3 (ref 140–450)
PLATELET # BLD AUTO: 217 10*3/MM3 (ref 140–450)
PMV BLD AUTO: 10.9 FL (ref 6–12)
PMV BLD AUTO: 11 FL (ref 6–12)
POTASSIUM SERPL-SCNC: 4.6 MMOL/L (ref 3.5–5.2)
POTASSIUM SERPL-SCNC: 5.1 MMOL/L (ref 3.5–5.2)
PROT SERPL-MCNC: 6.7 G/DL (ref 6–8.5)
PROT UR QL STRIP: ABNORMAL
QT INTERVAL: 444 MS
RBC # BLD AUTO: 2.57 10*6/MM3 (ref 3.77–5.28)
RBC # BLD AUTO: 2.58 10*6/MM3 (ref 3.77–5.28)
RBC # UR STRIP: ABNORMAL /HPF
RBC MORPH BLD: NORMAL
REF LAB TEST METHOD: ABNORMAL
SODIUM SERPL-SCNC: 136 MMOL/L (ref 136–145)
SODIUM SERPL-SCNC: 138 MMOL/L (ref 136–145)
SP GR UR STRIP: >=1.03 (ref 1–1.03)
SQUAMOUS #/AREA URNS HPF: ABNORMAL /HPF
TROPONIN T SERPL-MCNC: <0.01 NG/ML (ref 0–0.03)
TROPONIN T SERPL-MCNC: <0.01 NG/ML (ref 0–0.03)
UROBILINOGEN UR QL STRIP: ABNORMAL
VARIANT LYMPHS NFR BLD MANUAL: 6.1 % (ref 19.6–45.3)
VARIANT LYMPHS NFR BLD MANUAL: 8.2 % (ref 19.6–45.3)
WBC # UR STRIP: ABNORMAL /HPF
WBC MORPH BLD: NORMAL
WBC MORPH BLD: NORMAL
WBC NRBC COR # BLD: 33.8 10*3/MM3 (ref 3.4–10.8)
WBC NRBC COR # BLD: 34.66 10*3/MM3 (ref 3.4–10.8)

## 2023-01-23 PROCEDURE — 81001 URINALYSIS AUTO W/SCOPE: CPT | Performed by: EMERGENCY MEDICINE

## 2023-01-23 PROCEDURE — 25010000002 CEFTRIAXONE PER 250 MG: Performed by: EMERGENCY MEDICINE

## 2023-01-23 PROCEDURE — 84484 ASSAY OF TROPONIN QUANT: CPT | Performed by: EMERGENCY MEDICINE

## 2023-01-23 PROCEDURE — 25010000002 ONDANSETRON PER 1 MG: Performed by: NURSE PRACTITIONER

## 2023-01-23 PROCEDURE — C9399 UNCLASSIFIED DRUGS OR BIOLOG: HCPCS | Performed by: STUDENT IN AN ORGANIZED HEALTH CARE EDUCATION/TRAINING PROGRAM

## 2023-01-23 PROCEDURE — 83690 ASSAY OF LIPASE: CPT | Performed by: EMERGENCY MEDICINE

## 2023-01-23 PROCEDURE — 99285 EMERGENCY DEPT VISIT HI MDM: CPT

## 2023-01-23 PROCEDURE — 85025 COMPLETE CBC W/AUTO DIFF WBC: CPT | Performed by: EMERGENCY MEDICINE

## 2023-01-23 PROCEDURE — 83605 ASSAY OF LACTIC ACID: CPT | Performed by: EMERGENCY MEDICINE

## 2023-01-23 PROCEDURE — 25010000002 ONDANSETRON PER 1 MG: Performed by: EMERGENCY MEDICINE

## 2023-01-23 PROCEDURE — 80053 COMPREHEN METABOLIC PANEL: CPT | Performed by: EMERGENCY MEDICINE

## 2023-01-23 PROCEDURE — 82962 GLUCOSE BLOOD TEST: CPT

## 2023-01-23 PROCEDURE — 25010000002 MORPHINE PER 10 MG: Performed by: EMERGENCY MEDICINE

## 2023-01-23 PROCEDURE — 25010000002 IOPAMIDOL 61 % SOLUTION: Performed by: EMERGENCY MEDICINE

## 2023-01-23 PROCEDURE — 93005 ELECTROCARDIOGRAM TRACING: CPT | Performed by: EMERGENCY MEDICINE

## 2023-01-23 PROCEDURE — 36415 COLL VENOUS BLD VENIPUNCTURE: CPT

## 2023-01-23 PROCEDURE — 87186 SC STD MICRODIL/AGAR DIL: CPT | Performed by: EMERGENCY MEDICINE

## 2023-01-23 PROCEDURE — 85007 BL SMEAR W/DIFF WBC COUNT: CPT | Performed by: EMERGENCY MEDICINE

## 2023-01-23 PROCEDURE — 25010000002 ERTAPENEM PER 500 MG: Performed by: STUDENT IN AN ORGANIZED HEALTH CARE EDUCATION/TRAINING PROGRAM

## 2023-01-23 PROCEDURE — 87086 URINE CULTURE/COLONY COUNT: CPT | Performed by: EMERGENCY MEDICINE

## 2023-01-23 PROCEDURE — 85007 BL SMEAR W/DIFF WBC COUNT: CPT | Performed by: NURSE PRACTITIONER

## 2023-01-23 PROCEDURE — 63710000001 RUXOLITINIB 15 MG TABLET: Performed by: STUDENT IN AN ORGANIZED HEALTH CARE EDUCATION/TRAINING PROGRAM

## 2023-01-23 PROCEDURE — 93010 ELECTROCARDIOGRAM REPORT: CPT | Performed by: STUDENT IN AN ORGANIZED HEALTH CARE EDUCATION/TRAINING PROGRAM

## 2023-01-23 PROCEDURE — 74177 CT ABD & PELVIS W/CONTRAST: CPT

## 2023-01-23 PROCEDURE — 85025 COMPLETE CBC W/AUTO DIFF WBC: CPT | Performed by: NURSE PRACTITIONER

## 2023-01-23 PROCEDURE — 87088 URINE BACTERIA CULTURE: CPT | Performed by: EMERGENCY MEDICINE

## 2023-01-23 RX ORDER — ERTAPENEM 1 G/1
1 INJECTION, POWDER, LYOPHILIZED, FOR SOLUTION INTRAMUSCULAR; INTRAVENOUS EVERY 24 HOURS
Status: DISCONTINUED | OUTPATIENT
Start: 2023-01-23 | End: 2023-01-23

## 2023-01-23 RX ORDER — ATORVASTATIN CALCIUM 80 MG/1
80 TABLET, FILM COATED ORAL NIGHTLY
Status: DISCONTINUED | OUTPATIENT
Start: 2023-01-23 | End: 2023-01-26 | Stop reason: HOSPADM

## 2023-01-23 RX ORDER — PETROLATUM 420 MG/G
1 OINTMENT TOPICAL
Status: DISCONTINUED | OUTPATIENT
Start: 2023-01-23 | End: 2023-01-26 | Stop reason: HOSPADM

## 2023-01-23 RX ORDER — MORPHINE SULFATE 2 MG/ML
4 INJECTION, SOLUTION INTRAMUSCULAR; INTRAVENOUS ONCE
Status: COMPLETED | OUTPATIENT
Start: 2023-01-23 | End: 2023-01-23

## 2023-01-23 RX ORDER — DEXTROSE MONOHYDRATE 25 G/50ML
25 INJECTION, SOLUTION INTRAVENOUS
Status: DISCONTINUED | OUTPATIENT
Start: 2023-01-23 | End: 2023-01-26 | Stop reason: HOSPADM

## 2023-01-23 RX ORDER — BISACODYL 5 MG/1
5 TABLET, DELAYED RELEASE ORAL DAILY PRN
Status: DISCONTINUED | OUTPATIENT
Start: 2023-01-23 | End: 2023-01-26 | Stop reason: HOSPADM

## 2023-01-23 RX ORDER — LISINOPRIL 2.5 MG/1
2.5 TABLET ORAL DAILY
Status: DISCONTINUED | OUTPATIENT
Start: 2023-01-23 | End: 2023-01-24

## 2023-01-23 RX ORDER — NICOTINE POLACRILEX 4 MG
15 LOZENGE BUCCAL
Status: DISCONTINUED | OUTPATIENT
Start: 2023-01-23 | End: 2023-01-26 | Stop reason: HOSPADM

## 2023-01-23 RX ORDER — MORPHINE SULFATE 2 MG/ML
2 INJECTION, SOLUTION INTRAMUSCULAR; INTRAVENOUS ONCE
Status: COMPLETED | OUTPATIENT
Start: 2023-01-23 | End: 2023-01-23

## 2023-01-23 RX ORDER — SODIUM CHLORIDE 9 MG/ML
40 INJECTION, SOLUTION INTRAVENOUS AS NEEDED
Status: DISCONTINUED | OUTPATIENT
Start: 2023-01-23 | End: 2023-01-26 | Stop reason: HOSPADM

## 2023-01-23 RX ORDER — PANTOPRAZOLE SODIUM 40 MG/10ML
40 INJECTION, POWDER, LYOPHILIZED, FOR SOLUTION INTRAVENOUS
Status: DISCONTINUED | OUTPATIENT
Start: 2023-01-23 | End: 2023-01-24

## 2023-01-23 RX ORDER — ACETAMINOPHEN 325 MG/1
650 TABLET ORAL EVERY 4 HOURS PRN
Status: DISCONTINUED | OUTPATIENT
Start: 2023-01-23 | End: 2023-01-26 | Stop reason: HOSPADM

## 2023-01-23 RX ORDER — SODIUM CHLORIDE 0.9 % (FLUSH) 0.9 %
10 SYRINGE (ML) INJECTION AS NEEDED
Status: DISCONTINUED | OUTPATIENT
Start: 2023-01-23 | End: 2023-01-26 | Stop reason: HOSPADM

## 2023-01-23 RX ORDER — LISINOPRIL 2.5 MG/1
2.5 TABLET ORAL DAILY
COMMUNITY
Start: 2022-12-26 | End: 2023-01-26 | Stop reason: HOSPADM

## 2023-01-23 RX ORDER — NALOXONE HCL 0.4 MG/ML
0.4 VIAL (ML) INJECTION
Status: DISCONTINUED | OUTPATIENT
Start: 2023-01-23 | End: 2023-01-26 | Stop reason: HOSPADM

## 2023-01-23 RX ORDER — AMOXICILLIN 250 MG
2 CAPSULE ORAL 2 TIMES DAILY
Status: DISCONTINUED | OUTPATIENT
Start: 2023-01-23 | End: 2023-01-26 | Stop reason: HOSPADM

## 2023-01-23 RX ORDER — SODIUM CHLORIDE 0.9 % (FLUSH) 0.9 %
10 SYRINGE (ML) INJECTION EVERY 12 HOURS SCHEDULED
Status: DISCONTINUED | OUTPATIENT
Start: 2023-01-23 | End: 2023-01-26 | Stop reason: HOSPADM

## 2023-01-23 RX ORDER — ONDANSETRON 2 MG/ML
4 INJECTION INTRAMUSCULAR; INTRAVENOUS EVERY 6 HOURS PRN
Status: DISCONTINUED | OUTPATIENT
Start: 2023-01-23 | End: 2023-01-26 | Stop reason: HOSPADM

## 2023-01-23 RX ORDER — ONDANSETRON 2 MG/ML
4 INJECTION INTRAMUSCULAR; INTRAVENOUS ONCE
Status: COMPLETED | OUTPATIENT
Start: 2023-01-23 | End: 2023-01-23

## 2023-01-23 RX ORDER — BISACODYL 10 MG
10 SUPPOSITORY, RECTAL RECTAL DAILY PRN
Status: DISCONTINUED | OUTPATIENT
Start: 2023-01-23 | End: 2023-01-26 | Stop reason: HOSPADM

## 2023-01-23 RX ORDER — NITROGLYCERIN 0.4 MG/1
0.4 TABLET SUBLINGUAL
Status: DISCONTINUED | OUTPATIENT
Start: 2023-01-23 | End: 2023-01-26 | Stop reason: HOSPADM

## 2023-01-23 RX ORDER — CLOPIDOGREL BISULFATE 75 MG/1
75 TABLET ORAL DAILY
Status: DISCONTINUED | OUTPATIENT
Start: 2023-01-23 | End: 2023-01-26 | Stop reason: HOSPADM

## 2023-01-23 RX ORDER — ACETAMINOPHEN 160 MG/5ML
650 SOLUTION ORAL EVERY 4 HOURS PRN
Status: DISCONTINUED | OUTPATIENT
Start: 2023-01-23 | End: 2023-01-26 | Stop reason: HOSPADM

## 2023-01-23 RX ORDER — INSULIN LISPRO 100 [IU]/ML
0-7 INJECTION, SOLUTION INTRAVENOUS; SUBCUTANEOUS
Status: DISCONTINUED | OUTPATIENT
Start: 2023-01-23 | End: 2023-01-26 | Stop reason: HOSPADM

## 2023-01-23 RX ORDER — METOPROLOL SUCCINATE 25 MG/1
25 TABLET, EXTENDED RELEASE ORAL 2 TIMES DAILY
Status: DISCONTINUED | OUTPATIENT
Start: 2023-01-23 | End: 2023-01-26 | Stop reason: HOSPADM

## 2023-01-23 RX ORDER — POLYETHYLENE GLYCOL 3350 17 G/17G
17 POWDER, FOR SOLUTION ORAL DAILY PRN
Status: DISCONTINUED | OUTPATIENT
Start: 2023-01-23 | End: 2023-01-26 | Stop reason: HOSPADM

## 2023-01-23 RX ORDER — LATANOPROST 50 UG/ML
1 SOLUTION/ DROPS OPHTHALMIC NIGHTLY
Status: DISCONTINUED | OUTPATIENT
Start: 2023-01-23 | End: 2023-01-26 | Stop reason: HOSPADM

## 2023-01-23 RX ORDER — CHOLECALCIFEROL (VITAMIN D3) 125 MCG
5 CAPSULE ORAL NIGHTLY PRN
Status: DISCONTINUED | OUTPATIENT
Start: 2023-01-23 | End: 2023-01-26 | Stop reason: HOSPADM

## 2023-01-23 RX ORDER — MORPHINE SULFATE 1 MG/ML
1 INJECTION, SOLUTION EPIDURAL; INTRATHECAL; INTRAVENOUS EVERY 4 HOURS PRN
Status: DISCONTINUED | OUTPATIENT
Start: 2023-01-23 | End: 2023-01-26 | Stop reason: HOSPADM

## 2023-01-23 RX ADMIN — DOCUSATE SODIUM 50MG AND SENNOSIDES 8.6MG 2 TABLET: 8.6; 5 TABLET, FILM COATED ORAL at 12:27

## 2023-01-23 RX ADMIN — PANTOPRAZOLE SODIUM 40 MG: 40 INJECTION, POWDER, FOR SOLUTION INTRAVENOUS at 12:27

## 2023-01-23 RX ADMIN — ONDANSETRON 4 MG: 2 INJECTION INTRAMUSCULAR; INTRAVENOUS at 16:05

## 2023-01-23 RX ADMIN — SERTRALINE HYDROCHLORIDE 50 MG: 50 TABLET, FILM COATED ORAL at 12:27

## 2023-01-23 RX ADMIN — METOPROLOL SUCCINATE 25 MG: 25 TABLET, EXTENDED RELEASE ORAL at 20:48

## 2023-01-23 RX ADMIN — ACETAMINOPHEN 650 MG: 325 TABLET, FILM COATED ORAL at 13:33

## 2023-01-23 RX ADMIN — APIXABAN 5 MG: 5 TABLET, FILM COATED ORAL at 12:27

## 2023-01-23 RX ADMIN — IOPAMIDOL 100 ML: 612 INJECTION, SOLUTION INTRAVENOUS at 03:16

## 2023-01-23 RX ADMIN — Medication 10 ML: at 20:48

## 2023-01-23 RX ADMIN — MORPHINE SULFATE 4 MG: 2 INJECTION, SOLUTION INTRAMUSCULAR; INTRAVENOUS at 03:55

## 2023-01-23 RX ADMIN — PETROLATUM 1 APPLICATION: 420 OINTMENT TOPICAL at 17:42

## 2023-01-23 RX ADMIN — ERTAPENEM SODIUM 1 G: 1 INJECTION, POWDER, LYOPHILIZED, FOR SOLUTION INTRAMUSCULAR; INTRAVENOUS at 12:27

## 2023-01-23 RX ADMIN — METOPROLOL SUCCINATE 25 MG: 25 TABLET, EXTENDED RELEASE ORAL at 12:27

## 2023-01-23 RX ADMIN — ACETAMINOPHEN 650 MG: 325 TABLET, FILM COATED ORAL at 18:35

## 2023-01-23 RX ADMIN — LISINOPRIL 2.5 MG: 2.5 TABLET ORAL at 12:27

## 2023-01-23 RX ADMIN — RUXOLITINIB 15 MG: 15 TABLET ORAL at 20:49

## 2023-01-23 RX ADMIN — DOCUSATE SODIUM 50MG AND SENNOSIDES 8.6MG 2 TABLET: 8.6; 5 TABLET, FILM COATED ORAL at 20:48

## 2023-01-23 RX ADMIN — CLOPIDOGREL 75 MG: 75 TABLET, FILM COATED ORAL at 12:27

## 2023-01-23 RX ADMIN — ACETAMINOPHEN 650 MG: 325 SUPPOSITORY RECTAL at 09:14

## 2023-01-23 RX ADMIN — APIXABAN 5 MG: 5 TABLET, FILM COATED ORAL at 20:48

## 2023-01-23 RX ADMIN — CEFTRIAXONE SODIUM 1 G: 1 INJECTION, POWDER, FOR SOLUTION INTRAMUSCULAR; INTRAVENOUS at 04:30

## 2023-01-23 RX ADMIN — ONDANSETRON 4 MG: 2 INJECTION INTRAMUSCULAR; INTRAVENOUS at 02:20

## 2023-01-23 RX ADMIN — LATANOPROST 1 DROP: 50 SOLUTION OPHTHALMIC at 20:54

## 2023-01-23 RX ADMIN — ATORVASTATIN CALCIUM 80 MG: 80 TABLET, FILM COATED ORAL at 20:47

## 2023-01-23 RX ADMIN — RUXOLITINIB 15 MG: 15 TABLET ORAL at 16:10

## 2023-01-23 RX ADMIN — SODIUM CHLORIDE 500 ML: 9 INJECTION, SOLUTION INTRAVENOUS at 02:20

## 2023-01-23 RX ADMIN — ONDANSETRON 4 MG: 2 INJECTION INTRAMUSCULAR; INTRAVENOUS at 09:14

## 2023-01-23 RX ADMIN — MORPHINE SULFATE 2 MG: 2 INJECTION, SOLUTION INTRAMUSCULAR; INTRAVENOUS at 02:20

## 2023-01-24 PROBLEM — R10.13 EPIGASTRIC ABDOMINAL PAIN: Status: ACTIVE | Noted: 2023-01-24

## 2023-01-24 LAB
ALBUMIN SERPL-MCNC: 3.4 G/DL (ref 3.5–5.2)
ALBUMIN/GLOB SERPL: 1.1 G/DL
ALP SERPL-CCNC: 183 U/L (ref 39–117)
ALT SERPL W P-5'-P-CCNC: 19 U/L (ref 1–33)
ANION GAP SERPL CALCULATED.3IONS-SCNC: 9 MMOL/L (ref 5–15)
AST SERPL-CCNC: 15 U/L (ref 1–32)
BILIRUB SERPL-MCNC: 0.4 MG/DL (ref 0–1.2)
BUN SERPL-MCNC: 19 MG/DL (ref 8–23)
BUN/CREAT SERPL: 22.4 (ref 7–25)
CALCIUM SPEC-SCNC: 8.7 MG/DL (ref 8.6–10.5)
CHLORIDE SERPL-SCNC: 102 MMOL/L (ref 98–107)
CO2 SERPL-SCNC: 28 MMOL/L (ref 22–29)
CREAT SERPL-MCNC: 0.85 MG/DL (ref 0.57–1)
DEPRECATED RDW RBC AUTO: 55.3 FL (ref 37–54)
EGFRCR SERPLBLD CKD-EPI 2021: 67.7 ML/MIN/1.73
ERYTHROCYTE [DISTWIDTH] IN BLOOD BY AUTOMATED COUNT: 15.6 % (ref 12.3–15.4)
GLOBULIN UR ELPH-MCNC: 3 GM/DL
GLUCOSE BLDC GLUCOMTR-MCNC: 104 MG/DL (ref 70–130)
GLUCOSE BLDC GLUCOMTR-MCNC: 110 MG/DL (ref 70–130)
GLUCOSE BLDC GLUCOMTR-MCNC: 113 MG/DL (ref 70–130)
GLUCOSE BLDC GLUCOMTR-MCNC: 92 MG/DL (ref 70–130)
GLUCOSE SERPL-MCNC: 75 MG/DL (ref 65–99)
HCT VFR BLD AUTO: 23.6 % (ref 34–46.6)
HGB BLD-MCNC: 7.6 G/DL (ref 12–15.9)
LYMPHOCYTES # BLD MANUAL: 2.71 10*3/MM3 (ref 0.7–3.1)
LYMPHOCYTES NFR BLD MANUAL: 5.1 % (ref 5–12)
MCH RBC QN AUTO: 32.2 PG (ref 26.6–33)
MCHC RBC AUTO-ENTMCNC: 32.2 G/DL (ref 31.5–35.7)
MCV RBC AUTO: 100 FL (ref 79–97)
MONOCYTES # BLD: 1.95 10*3/MM3 (ref 0.1–0.9)
NEUTROPHILS # BLD AUTO: 33.54 10*3/MM3 (ref 1.7–7)
NEUTROPHILS NFR BLD MANUAL: 87.9 % (ref 42.7–76)
PLAT MORPH BLD: NORMAL
PLATELET # BLD AUTO: 217 10*3/MM3 (ref 140–450)
PMV BLD AUTO: 11.4 FL (ref 6–12)
POTASSIUM SERPL-SCNC: 4.2 MMOL/L (ref 3.5–5.2)
PROT SERPL-MCNC: 6.4 G/DL (ref 6–8.5)
RBC # BLD AUTO: 2.36 10*6/MM3 (ref 3.77–5.28)
RBC MORPH BLD: NORMAL
SODIUM SERPL-SCNC: 139 MMOL/L (ref 136–145)
VARIANT LYMPHS NFR BLD MANUAL: 7.1 % (ref 19.6–45.3)
WBC MORPH BLD: NORMAL
WBC NRBC COR # BLD: 38.16 10*3/MM3 (ref 3.4–10.8)

## 2023-01-24 PROCEDURE — 25010000002 ONDANSETRON PER 1 MG: Performed by: NURSE PRACTITIONER

## 2023-01-24 PROCEDURE — C9399 UNCLASSIFIED DRUGS OR BIOLOG: HCPCS | Performed by: STUDENT IN AN ORGANIZED HEALTH CARE EDUCATION/TRAINING PROGRAM

## 2023-01-24 PROCEDURE — 82962 GLUCOSE BLOOD TEST: CPT

## 2023-01-24 PROCEDURE — 85007 BL SMEAR W/DIFF WBC COUNT: CPT | Performed by: STUDENT IN AN ORGANIZED HEALTH CARE EDUCATION/TRAINING PROGRAM

## 2023-01-24 PROCEDURE — 85025 COMPLETE CBC W/AUTO DIFF WBC: CPT | Performed by: STUDENT IN AN ORGANIZED HEALTH CARE EDUCATION/TRAINING PROGRAM

## 2023-01-24 PROCEDURE — 63710000001 RUXOLITINIB 15 MG TABLET: Performed by: STUDENT IN AN ORGANIZED HEALTH CARE EDUCATION/TRAINING PROGRAM

## 2023-01-24 PROCEDURE — 80053 COMPREHEN METABOLIC PANEL: CPT | Performed by: STUDENT IN AN ORGANIZED HEALTH CARE EDUCATION/TRAINING PROGRAM

## 2023-01-24 PROCEDURE — 97530 THERAPEUTIC ACTIVITIES: CPT

## 2023-01-24 PROCEDURE — 97166 OT EVAL MOD COMPLEX 45 MIN: CPT

## 2023-01-24 PROCEDURE — 97162 PT EVAL MOD COMPLEX 30 MIN: CPT

## 2023-01-24 PROCEDURE — 97535 SELF CARE MNGMENT TRAINING: CPT

## 2023-01-24 PROCEDURE — 99222 1ST HOSP IP/OBS MODERATE 55: CPT | Performed by: INTERNAL MEDICINE

## 2023-01-24 PROCEDURE — 25010000002 ERTAPENEM PER 500 MG: Performed by: STUDENT IN AN ORGANIZED HEALTH CARE EDUCATION/TRAINING PROGRAM

## 2023-01-24 RX ORDER — PANTOPRAZOLE SODIUM 40 MG/10ML
40 INJECTION, POWDER, LYOPHILIZED, FOR SOLUTION INTRAVENOUS
Status: DISCONTINUED | OUTPATIENT
Start: 2023-01-24 | End: 2023-01-25

## 2023-01-24 RX ADMIN — ONDANSETRON 4 MG: 2 INJECTION INTRAMUSCULAR; INTRAVENOUS at 18:51

## 2023-01-24 RX ADMIN — APIXABAN 5 MG: 5 TABLET, FILM COATED ORAL at 08:37

## 2023-01-24 RX ADMIN — SERTRALINE HYDROCHLORIDE 50 MG: 50 TABLET, FILM COATED ORAL at 08:45

## 2023-01-24 RX ADMIN — LATANOPROST 1 DROP: 50 SOLUTION OPHTHALMIC at 21:44

## 2023-01-24 RX ADMIN — CLOPIDOGREL 75 MG: 75 TABLET, FILM COATED ORAL at 08:37

## 2023-01-24 RX ADMIN — PANTOPRAZOLE SODIUM 40 MG: 40 INJECTION, POWDER, FOR SOLUTION INTRAVENOUS at 05:45

## 2023-01-24 RX ADMIN — Medication 10 ML: at 21:44

## 2023-01-24 RX ADMIN — ATORVASTATIN CALCIUM 80 MG: 80 TABLET, FILM COATED ORAL at 21:43

## 2023-01-24 RX ADMIN — Medication 10 ML: at 13:02

## 2023-01-24 RX ADMIN — ACETAMINOPHEN 650 MG: 325 TABLET, FILM COATED ORAL at 11:15

## 2023-01-24 RX ADMIN — PANTOPRAZOLE SODIUM 40 MG: 40 INJECTION, POWDER, FOR SOLUTION INTRAVENOUS at 16:40

## 2023-01-24 RX ADMIN — RUXOLITINIB 15 MG: 15 TABLET ORAL at 21:41

## 2023-01-24 RX ADMIN — RUXOLITINIB 15 MG: 15 TABLET ORAL at 10:03

## 2023-01-24 RX ADMIN — METOPROLOL SUCCINATE 25 MG: 25 TABLET, EXTENDED RELEASE ORAL at 21:43

## 2023-01-24 RX ADMIN — ERTAPENEM SODIUM 1 G: 1 INJECTION, POWDER, LYOPHILIZED, FOR SOLUTION INTRAMUSCULAR; INTRAVENOUS at 13:01

## 2023-01-24 RX ADMIN — PETROLATUM 1 APPLICATION: 420 OINTMENT TOPICAL at 10:07

## 2023-01-24 RX ADMIN — ACETAMINOPHEN 650 MG: 325 TABLET, FILM COATED ORAL at 16:49

## 2023-01-24 RX ADMIN — ACETAMINOPHEN 650 MG: 325 TABLET, FILM COATED ORAL at 21:42

## 2023-01-24 RX ADMIN — DOCUSATE SODIUM 50MG AND SENNOSIDES 8.6MG 2 TABLET: 8.6; 5 TABLET, FILM COATED ORAL at 21:43

## 2023-01-24 RX ADMIN — DOCUSATE SODIUM 50MG AND SENNOSIDES 8.6MG 2 TABLET: 8.6; 5 TABLET, FILM COATED ORAL at 08:44

## 2023-01-25 ENCOUNTER — ANESTHESIA (OUTPATIENT)
Dept: GASTROENTEROLOGY | Facility: HOSPITAL | Age: 83
DRG: 392 | End: 2023-01-25
Payer: MEDICARE

## 2023-01-25 ENCOUNTER — APPOINTMENT (OUTPATIENT)
Dept: MRI IMAGING | Facility: HOSPITAL | Age: 83
DRG: 392 | End: 2023-01-25
Payer: MEDICARE

## 2023-01-25 ENCOUNTER — ANESTHESIA EVENT (OUTPATIENT)
Dept: GASTROENTEROLOGY | Facility: HOSPITAL | Age: 83
DRG: 392 | End: 2023-01-25
Payer: MEDICARE

## 2023-01-25 PROBLEM — N39.0 ACUTE UTI (URINARY TRACT INFECTION): Status: RESOLVED | Noted: 2022-11-21 | Resolved: 2023-01-25

## 2023-01-25 LAB
ALBUMIN SERPL-MCNC: 3.4 G/DL (ref 3.5–5.2)
ALBUMIN/GLOB SERPL: 1.2 G/DL
ALP SERPL-CCNC: 186 U/L (ref 39–117)
ALT SERPL W P-5'-P-CCNC: 20 U/L (ref 1–33)
ANION GAP SERPL CALCULATED.3IONS-SCNC: 10.2 MMOL/L (ref 5–15)
AST SERPL-CCNC: 21 U/L (ref 1–32)
BACTERIA SPEC AEROBE CULT: ABNORMAL
BASOPHILS # BLD MANUAL: 0.34 10*3/MM3 (ref 0–0.2)
BASOPHILS NFR BLD MANUAL: 1 % (ref 0–1.5)
BILIRUB SERPL-MCNC: 0.4 MG/DL (ref 0–1.2)
BUN SERPL-MCNC: 15 MG/DL (ref 8–23)
BUN/CREAT SERPL: 18.8 (ref 7–25)
CALCIUM SPEC-SCNC: 8.4 MG/DL (ref 8.6–10.5)
CHLORIDE SERPL-SCNC: 101 MMOL/L (ref 98–107)
CO2 SERPL-SCNC: 25.8 MMOL/L (ref 22–29)
CREAT SERPL-MCNC: 0.8 MG/DL (ref 0.57–1)
DEPRECATED RDW RBC AUTO: 54.3 FL (ref 37–54)
EGFRCR SERPLBLD CKD-EPI 2021: 72.8 ML/MIN/1.73
ERYTHROCYTE [DISTWIDTH] IN BLOOD BY AUTOMATED COUNT: 15.5 % (ref 12.3–15.4)
GGT SERPL-CCNC: 199 U/L (ref 5–36)
GLOBULIN UR ELPH-MCNC: 2.9 GM/DL
GLUCOSE BLDC GLUCOMTR-MCNC: 103 MG/DL (ref 70–130)
GLUCOSE BLDC GLUCOMTR-MCNC: 121 MG/DL (ref 70–130)
GLUCOSE BLDC GLUCOMTR-MCNC: 88 MG/DL (ref 70–130)
GLUCOSE BLDC GLUCOMTR-MCNC: 96 MG/DL (ref 70–130)
GLUCOSE SERPL-MCNC: 67 MG/DL (ref 65–99)
HCT VFR BLD AUTO: 23.5 % (ref 34–46.6)
HGB BLD-MCNC: 7.5 G/DL (ref 12–15.9)
LYMPHOCYTES # BLD MANUAL: 4.02 10*3/MM3 (ref 0.7–3.1)
MCH RBC QN AUTO: 31.5 PG (ref 26.6–33)
MCHC RBC AUTO-ENTMCNC: 31.9 G/DL (ref 31.5–35.7)
MCV RBC AUTO: 98.7 FL (ref 79–97)
MYELOCYTES NFR BLD MANUAL: 2 % (ref 0–0)
NEUTROPHILS # BLD AUTO: 28.49 10*3/MM3 (ref 1.7–7)
NEUTROPHILS NFR BLD MANUAL: 85 % (ref 42.7–76)
PLAT MORPH BLD: NORMAL
PLATELET # BLD AUTO: 222 10*3/MM3 (ref 140–450)
PMV BLD AUTO: 11.7 FL (ref 6–12)
POTASSIUM SERPL-SCNC: 3.6 MMOL/L (ref 3.5–5.2)
PROT SERPL-MCNC: 6.3 G/DL (ref 6–8.5)
RBC # BLD AUTO: 2.38 10*6/MM3 (ref 3.77–5.28)
RBC MORPH BLD: NORMAL
SODIUM SERPL-SCNC: 137 MMOL/L (ref 136–145)
VARIANT LYMPHS NFR BLD MANUAL: 12 % (ref 19.6–45.3)
WBC MORPH BLD: NORMAL
WBC NRBC COR # BLD: 33.52 10*3/MM3 (ref 3.4–10.8)

## 2023-01-25 PROCEDURE — 0DB98ZX EXCISION OF DUODENUM, VIA NATURAL OR ARTIFICIAL OPENING ENDOSCOPIC, DIAGNOSTIC: ICD-10-PCS | Performed by: INTERNAL MEDICINE

## 2023-01-25 PROCEDURE — 97110 THERAPEUTIC EXERCISES: CPT

## 2023-01-25 PROCEDURE — 0DB38ZX EXCISION OF LOWER ESOPHAGUS, VIA NATURAL OR ARTIFICIAL OPENING ENDOSCOPIC, DIAGNOSTIC: ICD-10-PCS | Performed by: INTERNAL MEDICINE

## 2023-01-25 PROCEDURE — 97535 SELF CARE MNGMENT TRAINING: CPT

## 2023-01-25 PROCEDURE — 99223 1ST HOSP IP/OBS HIGH 75: CPT | Performed by: INTERNAL MEDICINE

## 2023-01-25 PROCEDURE — 85025 COMPLETE CBC W/AUTO DIFF WBC: CPT | Performed by: STUDENT IN AN ORGANIZED HEALTH CARE EDUCATION/TRAINING PROGRAM

## 2023-01-25 PROCEDURE — 63710000001 RUXOLITINIB 15 MG TABLET: Performed by: STUDENT IN AN ORGANIZED HEALTH CARE EDUCATION/TRAINING PROGRAM

## 2023-01-25 PROCEDURE — 88305 TISSUE EXAM BY PATHOLOGIST: CPT | Performed by: INTERNAL MEDICINE

## 2023-01-25 PROCEDURE — 80053 COMPREHEN METABOLIC PANEL: CPT | Performed by: NURSE PRACTITIONER

## 2023-01-25 PROCEDURE — 85007 BL SMEAR W/DIFF WBC COUNT: CPT | Performed by: STUDENT IN AN ORGANIZED HEALTH CARE EDUCATION/TRAINING PROGRAM

## 2023-01-25 PROCEDURE — A9577 INJ MULTIHANCE: HCPCS | Performed by: HOSPITALIST

## 2023-01-25 PROCEDURE — 82962 GLUCOSE BLOOD TEST: CPT

## 2023-01-25 PROCEDURE — 0DB68ZX EXCISION OF STOMACH, VIA NATURAL OR ARTIFICIAL OPENING ENDOSCOPIC, DIAGNOSTIC: ICD-10-PCS | Performed by: INTERNAL MEDICINE

## 2023-01-25 PROCEDURE — C9399 UNCLASSIFIED DRUGS OR BIOLOG: HCPCS | Performed by: STUDENT IN AN ORGANIZED HEALTH CARE EDUCATION/TRAINING PROGRAM

## 2023-01-25 PROCEDURE — 0 LIDOCAINE 1 % SOLUTION: Performed by: NURSE ANESTHETIST, CERTIFIED REGISTERED

## 2023-01-25 PROCEDURE — 25010000002 ONDANSETRON PER 1 MG: Performed by: NURSE PRACTITIONER

## 2023-01-25 PROCEDURE — 0 GADOBENATE DIMEGLUMINE 529 MG/ML SOLUTION: Performed by: HOSPITALIST

## 2023-01-25 PROCEDURE — 82977 ASSAY OF GGT: CPT | Performed by: INTERNAL MEDICINE

## 2023-01-25 PROCEDURE — 25010000002 PROPOFOL 10 MG/ML EMULSION: Performed by: NURSE ANESTHETIST, CERTIFIED REGISTERED

## 2023-01-25 PROCEDURE — 74183 MRI ABD W/O CNTR FLWD CNTR: CPT

## 2023-01-25 PROCEDURE — 43239 EGD BIOPSY SINGLE/MULTIPLE: CPT | Performed by: INTERNAL MEDICINE

## 2023-01-25 RX ORDER — LORAZEPAM 0.5 MG/1
0.5 TABLET ORAL ONCE
Status: COMPLETED | OUTPATIENT
Start: 2023-01-25 | End: 2023-01-25

## 2023-01-25 RX ORDER — PANTOPRAZOLE SODIUM 40 MG/1
40 TABLET, DELAYED RELEASE ORAL
Status: DISCONTINUED | OUTPATIENT
Start: 2023-01-26 | End: 2023-01-26 | Stop reason: HOSPADM

## 2023-01-25 RX ORDER — PROPOFOL 10 MG/ML
VIAL (ML) INTRAVENOUS AS NEEDED
Status: DISCONTINUED | OUTPATIENT
Start: 2023-01-25 | End: 2023-01-25 | Stop reason: SURG

## 2023-01-25 RX ORDER — SODIUM CHLORIDE 9 MG/ML
30 INJECTION, SOLUTION INTRAVENOUS CONTINUOUS PRN
Status: DISCONTINUED | OUTPATIENT
Start: 2023-01-25 | End: 2023-01-26 | Stop reason: HOSPADM

## 2023-01-25 RX ORDER — LIDOCAINE HYDROCHLORIDE 10 MG/ML
INJECTION, SOLUTION INFILTRATION; PERINEURAL AS NEEDED
Status: DISCONTINUED | OUTPATIENT
Start: 2023-01-25 | End: 2023-01-25 | Stop reason: SURG

## 2023-01-25 RX ADMIN — LATANOPROST 1 DROP: 50 SOLUTION OPHTHALMIC at 22:04

## 2023-01-25 RX ADMIN — SODIUM CHLORIDE 30 ML/HR: 9 INJECTION, SOLUTION INTRAVENOUS at 13:35

## 2023-01-25 RX ADMIN — ATORVASTATIN CALCIUM 80 MG: 80 TABLET, FILM COATED ORAL at 22:14

## 2023-01-25 RX ADMIN — DOCUSATE SODIUM 50MG AND SENNOSIDES 8.6MG 2 TABLET: 8.6; 5 TABLET, FILM COATED ORAL at 08:50

## 2023-01-25 RX ADMIN — RUXOLITINIB 15 MG: 15 TABLET ORAL at 08:50

## 2023-01-25 RX ADMIN — APIXABAN 5 MG: 5 TABLET, FILM COATED ORAL at 22:14

## 2023-01-25 RX ADMIN — PANTOPRAZOLE SODIUM 40 MG: 40 INJECTION, POWDER, FOR SOLUTION INTRAVENOUS at 08:50

## 2023-01-25 RX ADMIN — PETROLATUM 1 APPLICATION: 420 OINTMENT TOPICAL at 10:32

## 2023-01-25 RX ADMIN — Medication 10 ML: at 09:01

## 2023-01-25 RX ADMIN — PROPOFOL 200 MCG/KG/MIN: 10 INJECTION, EMULSION INTRAVENOUS at 14:01

## 2023-01-25 RX ADMIN — LIDOCAINE HYDROCHLORIDE 30 MG: 10 INJECTION, SOLUTION INFILTRATION; PERINEURAL at 14:00

## 2023-01-25 RX ADMIN — LORAZEPAM 0.5 MG: 0.5 TABLET ORAL at 19:18

## 2023-01-25 RX ADMIN — METOPROLOL SUCCINATE 25 MG: 25 TABLET, EXTENDED RELEASE ORAL at 21:59

## 2023-01-25 RX ADMIN — RUXOLITINIB 15 MG: 15 TABLET ORAL at 23:10

## 2023-01-25 RX ADMIN — METOPROLOL SUCCINATE 25 MG: 25 TABLET, EXTENDED RELEASE ORAL at 08:50

## 2023-01-25 RX ADMIN — ACETAMINOPHEN 650 MG: 325 TABLET, FILM COATED ORAL at 15:29

## 2023-01-25 RX ADMIN — GADOBENATE DIMEGLUMINE 14 ML: 529 INJECTION, SOLUTION INTRAVENOUS at 20:39

## 2023-01-25 RX ADMIN — SERTRALINE HYDROCHLORIDE 50 MG: 50 TABLET, FILM COATED ORAL at 08:50

## 2023-01-25 RX ADMIN — PROPOFOL 120 MG: 10 INJECTION, EMULSION INTRAVENOUS at 14:00

## 2023-01-25 RX ADMIN — CLOPIDOGREL 75 MG: 75 TABLET, FILM COATED ORAL at 08:50

## 2023-01-25 RX ADMIN — Medication 10 ML: at 22:00

## 2023-01-25 RX ADMIN — ONDANSETRON 4 MG: 2 INJECTION INTRAMUSCULAR; INTRAVENOUS at 15:29

## 2023-01-25 NOTE — ANESTHESIA PREPROCEDURE EVALUATION
Anesthesia Evaluation                  Airway   Mallampati: II  TM distance: >3 FB  Neck ROM: full  Dental    (+) upper dentures and partials    Pulmonary    (+) a smoker Former, home oxygen,   (-) recent URI, sleep apnea  Cardiovascular     (+) hypertension, CAD, dysrhythmias Atrial Fib, CHF ,  carotid artery disease      Neuro/Psych  (+) TIA,    (-) dizziness/light headedness, syncope  GI/Hepatic/Renal/Endo    (+)  GERD, PUD, GI bleeding , diabetes mellitus,     Musculoskeletal     Abdominal    Substance History      OB/GYN          Other   blood dyscrasia,   history of cancer (breast)                    Anesthesia Plan    ASA 3     MAC     intravenous induction     Anesthetic plan, risks, benefits, and alternatives have been provided, discussed and informed consent has been obtained with: patient.        CODE STATUS:    Code Status (Patient has no pulse and is not breathing): No CPR (Do Not Attempt to Resuscitate)  Medical Interventions (Patient has pulse or is breathing): Full Support  Release to patient: Routine Release

## 2023-01-25 NOTE — ANESTHESIA POSTPROCEDURE EVALUATION
"Patient: Catherine Boyer    Procedure Summary     Date: 01/25/23 Room / Location:  [facility]    Anesthesia Start: 1353 Anesthesia Stop: 1421    Procedure: ESOPHAGOGASTRODUODENOSCOPY WITH BIOPSIES (Esophagus) Diagnosis:       Epigastric abdominal pain      (Epigastric abdominal pain [R10.13])    Surgeons: Charles Diaz MD Provider: Noemy Kinney MD    Anesthesia Type: MAC ASA Status: 3          Anesthesia Type: MAC    Vitals  Vitals Value Taken Time   /62 01/25/23 1442   Temp     Pulse 69 01/25/23 1442   Resp 17 01/25/23 1442   SpO2 92 % 01/25/23 1442           Post Anesthesia Care and Evaluation    Patient location during evaluation: bedside  Patient participation: complete - patient participated  Level of consciousness: awake and alert  Pain management: adequate    Airway patency: patent  Anesthetic complications: No anesthetic complications    Cardiovascular status: acceptable  Respiratory status: acceptable  Hydration status: acceptable    Comments: /62   Pulse 69   Temp 36.6 °C (97.9 °F) (Oral)   Resp 17   Ht 152.4 cm (60\")   Wt 69.5 kg (153 lb 3.5 oz)   SpO2 92%   BMI 29.92 kg/m²         "

## 2023-01-26 ENCOUNTER — READMISSION MANAGEMENT (OUTPATIENT)
Dept: CALL CENTER | Facility: HOSPITAL | Age: 83
End: 2023-01-26
Payer: MEDICARE

## 2023-01-26 VITALS
OXYGEN SATURATION: 95 % | BODY MASS INDEX: 30.08 KG/M2 | HEART RATE: 100 BPM | DIASTOLIC BLOOD PRESSURE: 62 MMHG | TEMPERATURE: 98.6 F | SYSTOLIC BLOOD PRESSURE: 127 MMHG | WEIGHT: 153.22 LBS | HEIGHT: 60 IN | RESPIRATION RATE: 18 BRPM

## 2023-01-26 PROBLEM — K29.70 GASTRITIS: Status: ACTIVE | Noted: 2023-01-26

## 2023-01-26 LAB
BASOPHILS # BLD MANUAL: 0.33 10*3/MM3 (ref 0–0.2)
BASOPHILS NFR BLD MANUAL: 1 % (ref 0–1.5)
DACRYOCYTES BLD QL SMEAR: ABNORMAL
DEPRECATED RDW RBC AUTO: 55 FL (ref 37–54)
EOSINOPHIL # BLD MANUAL: 0.33 10*3/MM3 (ref 0–0.4)
EOSINOPHIL NFR BLD MANUAL: 1 % (ref 0.3–6.2)
ERYTHROCYTE [DISTWIDTH] IN BLOOD BY AUTOMATED COUNT: 15.6 % (ref 12.3–15.4)
GLUCOSE BLDC GLUCOMTR-MCNC: 107 MG/DL (ref 70–130)
GLUCOSE BLDC GLUCOMTR-MCNC: 90 MG/DL (ref 70–130)
HCT VFR BLD AUTO: 22.2 % (ref 34–46.6)
HGB BLD-MCNC: 7.5 G/DL (ref 12–15.9)
HYPOCHROMIA BLD QL: ABNORMAL
LAB AP CASE REPORT: NORMAL
LYMPHOCYTES # BLD MANUAL: 1.34 10*3/MM3 (ref 0.7–3.1)
LYMPHOCYTES NFR BLD MANUAL: 5.1 % (ref 5–12)
MCH RBC QN AUTO: 32.8 PG (ref 26.6–33)
MCHC RBC AUTO-ENTMCNC: 33.8 G/DL (ref 31.5–35.7)
MCV RBC AUTO: 96.9 FL (ref 79–97)
METAMYELOCYTES NFR BLD MANUAL: 2 % (ref 0–0)
MONOCYTES # BLD: 1.71 10*3/MM3 (ref 0.1–0.9)
MYELOCYTES NFR BLD MANUAL: 1 % (ref 0–0)
NEUTROPHILS # BLD AUTO: 28.74 10*3/MM3 (ref 1.7–7)
NEUTROPHILS NFR BLD MANUAL: 85.9 % (ref 42.7–76)
PATH REPORT.FINAL DX SPEC: NORMAL
PATH REPORT.GROSS SPEC: NORMAL
PLAT MORPH BLD: NORMAL
PLATELET # BLD AUTO: 217 10*3/MM3 (ref 140–450)
PMV BLD AUTO: 11.4 FL (ref 6–12)
RBC # BLD AUTO: 2.29 10*6/MM3 (ref 3.77–5.28)
VARIANT LYMPHS NFR BLD MANUAL: 4 % (ref 19.6–45.3)
WBC MORPH BLD: NORMAL
WBC NRBC COR # BLD: 33.46 10*3/MM3 (ref 3.4–10.8)

## 2023-01-26 PROCEDURE — 82962 GLUCOSE BLOOD TEST: CPT

## 2023-01-26 PROCEDURE — 99232 SBSQ HOSP IP/OBS MODERATE 35: CPT | Performed by: INTERNAL MEDICINE

## 2023-01-26 PROCEDURE — 85007 BL SMEAR W/DIFF WBC COUNT: CPT | Performed by: STUDENT IN AN ORGANIZED HEALTH CARE EDUCATION/TRAINING PROGRAM

## 2023-01-26 PROCEDURE — 85025 COMPLETE CBC W/AUTO DIFF WBC: CPT | Performed by: STUDENT IN AN ORGANIZED HEALTH CARE EDUCATION/TRAINING PROGRAM

## 2023-01-26 PROCEDURE — 63710000001 RUXOLITINIB 15 MG TABLET: Performed by: STUDENT IN AN ORGANIZED HEALTH CARE EDUCATION/TRAINING PROGRAM

## 2023-01-26 PROCEDURE — C9399 UNCLASSIFIED DRUGS OR BIOLOG: HCPCS | Performed by: STUDENT IN AN ORGANIZED HEALTH CARE EDUCATION/TRAINING PROGRAM

## 2023-01-26 RX ORDER — FUROSEMIDE 40 MG/1
40 TABLET ORAL DAILY
Qty: 90 TABLET | Refills: 3 | Status: SHIPPED | OUTPATIENT
Start: 2023-01-27

## 2023-01-26 RX ADMIN — APIXABAN 5 MG: 5 TABLET, FILM COATED ORAL at 11:40

## 2023-01-26 RX ADMIN — PETROLATUM 1 APPLICATION: 420 OINTMENT TOPICAL at 11:47

## 2023-01-26 RX ADMIN — METOPROLOL SUCCINATE 25 MG: 25 TABLET, EXTENDED RELEASE ORAL at 08:15

## 2023-01-26 RX ADMIN — RUXOLITINIB 15 MG: 15 TABLET ORAL at 08:16

## 2023-01-26 RX ADMIN — Medication 10 ML: at 08:16

## 2023-01-26 RX ADMIN — SERTRALINE HYDROCHLORIDE 50 MG: 50 TABLET, FILM COATED ORAL at 08:15

## 2023-01-26 RX ADMIN — CLOPIDOGREL 75 MG: 75 TABLET, FILM COATED ORAL at 08:16

## 2023-01-26 RX ADMIN — PANTOPRAZOLE SODIUM 40 MG: 40 TABLET, DELAYED RELEASE ORAL at 06:10

## 2023-01-27 NOTE — OUTREACH NOTE
Prep Survey    Flowsheet Row Responses   [facility] facility patient discharged from? [place]   Is LACE score < 7 ? No   Eligibility Readm Mgmt   Discharge diagnosis Epigastric abdominal pain   Does the patient have one of the following disease processes/diagnoses(primary or secondary)? Other   Does the patient have Home health ordered? Yes   What is the Home health agency?  [facility]   Is there a DME ordered? No   Prep survey completed? Yes          YUMIKO MEZA - Registered Nurse

## 2023-01-30 ENCOUNTER — READMISSION MANAGEMENT (OUTPATIENT)
Dept: CALL CENTER | Facility: HOSPITAL | Age: 83
End: 2023-01-30
Payer: MEDICARE

## 2023-01-30 NOTE — OUTREACH NOTE
Medical Week 1 Survey    Flowsheet Row Responses   [facility] patient discharged from? [place]   Does the patient have one of the following disease processes/diagnoses(primary or secondary)? Other   Week 1 attempt successful? Yes   Call end time 1831   Person spoke with today (if not patient) and relationship Karen daughter   Meds reviewed with patient/caregiver? Yes   Is the patient having any side effects they believe may be caused by any medication additions or changes? No   Does the patient have all medications ordered at discharge? Yes   Is the patient taking all medications as directed (includes completed medication regime)? Yes   Does the patient have a primary care provider?  Yes   Does the patient have an appointment with their PCP within 7 days of discharge? Yes   Has the patient kept scheduled appointments due by today? N/A   What is the Home health agency?  [facility]   Has home health visited the patient within 72 hours of discharge? Yes   Home health comments Discharged her   Psychosocial issues? No   Did the patient receive a copy of their discharge instructions? Yes   Nursing interventions Reviewed instructions with patient   What is the patient's perception of their health status since discharge? Improving   Is the patient/caregiver able to teach back signs and symptoms related to disease process for when to call PCP? Yes   Is the patient/caregiver able to teach back signs and symptoms related to disease process for when to call 911? Yes   Is the patient/caregiver able to teach back the hierarchy of who to call/visit for symptoms/problems? PCP, Specialist, Home health nurse, Urgent Care, ED, 911 Yes   If the patient is a current smoker, are they able to teach back resources for cessation? Not a smoker   Additional teach back comments Will keep bp log due to discontinue lisinopril.  States she is doing well has follow ups scheduled.     Week 1 call completed? Yes   Graduated Yes    Graduated/Revoked comments Denies questions or needs at this time.          SARA MCALLISTER - Licensed Nurse

## 2023-02-03 DIAGNOSIS — B37.81 CANDIDAL ESOPHAGITIS: Primary | ICD-10-CM

## 2023-02-04 ENCOUNTER — HOSPITAL ENCOUNTER (INPATIENT)
Facility: HOSPITAL | Age: 83
LOS: 8 days | Discharge: HOME | DRG: 378 | End: 2023-02-12
Attending: EMERGENCY MEDICINE | Admitting: INTERNAL MEDICINE
Payer: MEDICARE

## 2023-02-04 ENCOUNTER — APPOINTMENT (OUTPATIENT)
Dept: CT IMAGING | Facility: HOSPITAL | Age: 83
DRG: 378 | End: 2023-02-04
Payer: MEDICARE

## 2023-02-04 DIAGNOSIS — D62 ABLA (ACUTE BLOOD LOSS ANEMIA): Primary | ICD-10-CM

## 2023-02-04 DIAGNOSIS — Z79.01 CHRONIC ANTICOAGULATION: ICD-10-CM

## 2023-02-04 DIAGNOSIS — K92.2 GASTROINTESTINAL HEMORRHAGE, UNSPECIFIED GASTROINTESTINAL HEMORRHAGE TYPE: ICD-10-CM

## 2023-02-04 LAB
ABO GROUP BLD: NORMAL
ALBUMIN SERPL-MCNC: 4.1 G/DL (ref 3.5–5.2)
ALBUMIN/GLOB SERPL: 1.9 G/DL
ALP SERPL-CCNC: 194 U/L (ref 39–117)
ALT SERPL W P-5'-P-CCNC: 52 U/L (ref 1–33)
ANION GAP SERPL CALCULATED.3IONS-SCNC: 10 MMOL/L (ref 5–15)
ANISOCYTOSIS BLD QL: ABNORMAL
APTT PPP: 41.2 SECONDS (ref 22.7–35.4)
AST SERPL-CCNC: 35 U/L (ref 1–32)
BILIRUB SERPL-MCNC: 0.6 MG/DL (ref 0–1.2)
BLD GP AB SCN SERPL QL: NEGATIVE
BUN SERPL-MCNC: 13 MG/DL (ref 8–23)
BUN/CREAT SERPL: 17.1 (ref 7–25)
CALCIUM SPEC-SCNC: 8.6 MG/DL (ref 8.6–10.5)
CHLORIDE SERPL-SCNC: 100 MMOL/L (ref 98–107)
CO2 SERPL-SCNC: 26 MMOL/L (ref 22–29)
CREAT SERPL-MCNC: 0.76 MG/DL (ref 0.57–1)
DACRYOCYTES BLD QL SMEAR: ABNORMAL
DEPRECATED RDW RBC AUTO: 51.7 FL (ref 37–54)
EGFRCR SERPLBLD CKD-EPI 2021: 77.4 ML/MIN/1.73
ERYTHROCYTE [DISTWIDTH] IN BLOOD BY AUTOMATED COUNT: 15.1 % (ref 12.3–15.4)
FERRITIN SERPL-MCNC: 115 NG/ML (ref 13–150)
GLOBULIN UR ELPH-MCNC: 2.2 GM/DL
GLUCOSE SERPL-MCNC: 119 MG/DL (ref 65–99)
HCT VFR BLD AUTO: 18.4 % (ref 34–46.6)
HGB BLD-MCNC: 6.1 G/DL (ref 12–15.9)
INR PPP: 1.16 (ref 0.9–1.1)
IRON 24H UR-MRATE: 63 MCG/DL (ref 37–145)
IRON SATN MFR SERPL: 12 % (ref 20–50)
LIPASE SERPL-CCNC: 10 U/L (ref 13–60)
LYMPHOCYTES # BLD MANUAL: 1.13 10*3/MM3 (ref 0.7–3.1)
LYMPHOCYTES NFR BLD MANUAL: 11 % (ref 5–12)
MCH RBC QN AUTO: 32.1 PG (ref 26.6–33)
MCHC RBC AUTO-ENTMCNC: 33.2 G/DL (ref 31.5–35.7)
MCV RBC AUTO: 96.8 FL (ref 79–97)
METAMYELOCYTES NFR BLD MANUAL: 3 % (ref 0–0)
MICROCYTES BLD QL: ABNORMAL
MONOCYTES # BLD: 6.2 10*3/MM3 (ref 0.1–0.9)
MYELOCYTES NFR BLD MANUAL: 2 % (ref 0–0)
NEUTROPHILS # BLD AUTO: 46.22 10*3/MM3 (ref 1.7–7)
NEUTROPHILS NFR BLD MANUAL: 82 % (ref 42.7–76)
PLAT MORPH BLD: NORMAL
PLATELET # BLD AUTO: 385 10*3/MM3 (ref 140–450)
PMV BLD AUTO: 11.2 FL (ref 6–12)
POTASSIUM SERPL-SCNC: 4.3 MMOL/L (ref 3.5–5.2)
PROT SERPL-MCNC: 6.3 G/DL (ref 6–8.5)
PROTHROMBIN TIME: 15 SECONDS (ref 11.7–14.2)
RBC # BLD AUTO: 1.9 10*6/MM3 (ref 3.77–5.28)
RH BLD: POSITIVE
SODIUM SERPL-SCNC: 136 MMOL/L (ref 136–145)
STOMATOCYTES BLD QL SMEAR: ABNORMAL
T&S EXPIRATION DATE: NORMAL
TIBC SERPL-MCNC: 510 MCG/DL (ref 298–536)
TRANSFERRIN SERPL-MCNC: 342 MG/DL (ref 200–360)
VARIANT LYMPHS NFR BLD MANUAL: 2 % (ref 19.6–45.3)
WBC MORPH BLD: NORMAL
WBC NRBC COR # BLD: 56.36 10*3/MM3 (ref 3.4–10.8)

## 2023-02-04 PROCEDURE — 85014 HEMATOCRIT: CPT | Performed by: NURSE PRACTITIONER

## 2023-02-04 PROCEDURE — 83690 ASSAY OF LIPASE: CPT | Performed by: EMERGENCY MEDICINE

## 2023-02-04 PROCEDURE — 84466 ASSAY OF TRANSFERRIN: CPT | Performed by: INTERNAL MEDICINE

## 2023-02-04 PROCEDURE — 85018 HEMOGLOBIN: CPT | Performed by: NURSE PRACTITIONER

## 2023-02-04 PROCEDURE — 85025 COMPLETE CBC W/AUTO DIFF WBC: CPT | Performed by: EMERGENCY MEDICINE

## 2023-02-04 PROCEDURE — 85007 BL SMEAR W/DIFF WBC COUNT: CPT | Performed by: EMERGENCY MEDICINE

## 2023-02-04 PROCEDURE — 86900 BLOOD TYPING SEROLOGIC ABO: CPT | Performed by: EMERGENCY MEDICINE

## 2023-02-04 PROCEDURE — 85610 PROTHROMBIN TIME: CPT | Performed by: EMERGENCY MEDICINE

## 2023-02-04 PROCEDURE — P9016 RBC LEUKOCYTES REDUCED: HCPCS

## 2023-02-04 PROCEDURE — 83540 ASSAY OF IRON: CPT | Performed by: INTERNAL MEDICINE

## 2023-02-04 PROCEDURE — 63710000001 RUXOLITINIB 15 MG TABLET: Performed by: INTERNAL MEDICINE

## 2023-02-04 PROCEDURE — 86900 BLOOD TYPING SEROLOGIC ABO: CPT

## 2023-02-04 PROCEDURE — 25010000002 IOPAMIDOL 61 % SOLUTION: Performed by: EMERGENCY MEDICINE

## 2023-02-04 PROCEDURE — 86850 RBC ANTIBODY SCREEN: CPT | Performed by: EMERGENCY MEDICINE

## 2023-02-04 PROCEDURE — 74177 CT ABD & PELVIS W/CONTRAST: CPT

## 2023-02-04 PROCEDURE — 85730 THROMBOPLASTIN TIME PARTIAL: CPT | Performed by: EMERGENCY MEDICINE

## 2023-02-04 PROCEDURE — 25010000002 ONDANSETRON PER 1 MG: Performed by: EMERGENCY MEDICINE

## 2023-02-04 PROCEDURE — C9399 UNCLASSIFIED DRUGS OR BIOLOG: HCPCS | Performed by: INTERNAL MEDICINE

## 2023-02-04 PROCEDURE — 86923 COMPATIBILITY TEST ELECTRIC: CPT

## 2023-02-04 PROCEDURE — 36430 TRANSFUSION BLD/BLD COMPNT: CPT

## 2023-02-04 PROCEDURE — 82728 ASSAY OF FERRITIN: CPT | Performed by: INTERNAL MEDICINE

## 2023-02-04 PROCEDURE — 86901 BLOOD TYPING SEROLOGIC RH(D): CPT | Performed by: EMERGENCY MEDICINE

## 2023-02-04 PROCEDURE — 99222 1ST HOSP IP/OBS MODERATE 55: CPT | Performed by: INTERNAL MEDICINE

## 2023-02-04 PROCEDURE — 80053 COMPREHEN METABOLIC PANEL: CPT | Performed by: EMERGENCY MEDICINE

## 2023-02-04 PROCEDURE — 99285 EMERGENCY DEPT VISIT HI MDM: CPT

## 2023-02-04 PROCEDURE — 25010000002 HYDROMORPHONE PER 4 MG: Performed by: EMERGENCY MEDICINE

## 2023-02-04 RX ORDER — PANTOPRAZOLE SODIUM 40 MG/10ML
40 INJECTION, POWDER, LYOPHILIZED, FOR SOLUTION INTRAVENOUS ONCE
Status: COMPLETED | OUTPATIENT
Start: 2023-02-04 | End: 2023-02-04

## 2023-02-04 RX ORDER — ATORVASTATIN CALCIUM 80 MG/1
80 TABLET, FILM COATED ORAL NIGHTLY
Status: DISCONTINUED | OUTPATIENT
Start: 2023-02-04 | End: 2023-02-12 | Stop reason: HOSPADM

## 2023-02-04 RX ORDER — PANTOPRAZOLE SODIUM 40 MG/10ML
40 INJECTION, POWDER, LYOPHILIZED, FOR SOLUTION INTRAVENOUS EVERY 12 HOURS SCHEDULED
Status: DISCONTINUED | OUTPATIENT
Start: 2023-02-04 | End: 2023-02-04 | Stop reason: SDUPTHER

## 2023-02-04 RX ORDER — LATANOPROST 50 UG/ML
1 SOLUTION/ DROPS OPHTHALMIC NIGHTLY
Status: DISCONTINUED | OUTPATIENT
Start: 2023-02-04 | End: 2023-02-12 | Stop reason: HOSPADM

## 2023-02-04 RX ORDER — FUROSEMIDE 40 MG/1
40 TABLET ORAL DAILY
Status: DISCONTINUED | OUTPATIENT
Start: 2023-02-05 | End: 2023-02-12 | Stop reason: HOSPADM

## 2023-02-04 RX ORDER — UREA 10 %
3 LOTION (ML) TOPICAL NIGHTLY PRN
Status: DISCONTINUED | OUTPATIENT
Start: 2023-02-04 | End: 2023-02-12 | Stop reason: HOSPADM

## 2023-02-04 RX ORDER — SODIUM CHLORIDE 9 MG/ML
150 INJECTION, SOLUTION INTRAVENOUS CONTINUOUS
Status: DISCONTINUED | OUTPATIENT
Start: 2023-02-04 | End: 2023-02-05

## 2023-02-04 RX ORDER — ONDANSETRON 2 MG/ML
4 INJECTION INTRAMUSCULAR; INTRAVENOUS EVERY 6 HOURS PRN
Status: DISCONTINUED | OUTPATIENT
Start: 2023-02-04 | End: 2023-02-12 | Stop reason: HOSPADM

## 2023-02-04 RX ORDER — ONDANSETRON 2 MG/ML
4 INJECTION INTRAMUSCULAR; INTRAVENOUS ONCE
Status: COMPLETED | OUTPATIENT
Start: 2023-02-04 | End: 2023-02-04

## 2023-02-04 RX ORDER — ACETAMINOPHEN 325 MG/1
650 TABLET ORAL EVERY 4 HOURS PRN
Status: DISCONTINUED | OUTPATIENT
Start: 2023-02-04 | End: 2023-02-12 | Stop reason: HOSPADM

## 2023-02-04 RX ORDER — ONDANSETRON 4 MG/1
4 TABLET, FILM COATED ORAL EVERY 6 HOURS PRN
Status: DISCONTINUED | OUTPATIENT
Start: 2023-02-04 | End: 2023-02-12 | Stop reason: HOSPADM

## 2023-02-04 RX ORDER — HYDROMORPHONE HYDROCHLORIDE 1 MG/ML
0.5 INJECTION, SOLUTION INTRAMUSCULAR; INTRAVENOUS; SUBCUTANEOUS ONCE
Status: COMPLETED | OUTPATIENT
Start: 2023-02-04 | End: 2023-02-04

## 2023-02-04 RX ORDER — SODIUM CHLORIDE 0.9 % (FLUSH) 0.9 %
10 SYRINGE (ML) INJECTION AS NEEDED
Status: DISCONTINUED | OUTPATIENT
Start: 2023-02-04 | End: 2023-02-12 | Stop reason: HOSPADM

## 2023-02-04 RX ADMIN — ATORVASTATIN CALCIUM 80 MG: 80 TABLET, FILM COATED ORAL at 21:21

## 2023-02-04 RX ADMIN — PANTOPRAZOLE SODIUM 40 MG: 40 INJECTION, POWDER, FOR SOLUTION INTRAVENOUS at 17:40

## 2023-02-04 RX ADMIN — PANTOPRAZOLE SODIUM 8 MG/HR: 40 INJECTION, POWDER, FOR SOLUTION INTRAVENOUS at 17:38

## 2023-02-04 RX ADMIN — LATANOPROST 1 DROP: 50 SOLUTION OPHTHALMIC at 23:10

## 2023-02-04 RX ADMIN — SODIUM CHLORIDE 150 ML/HR: 9 INJECTION, SOLUTION INTRAVENOUS at 21:27

## 2023-02-04 RX ADMIN — ONDANSETRON 4 MG: 2 INJECTION INTRAMUSCULAR; INTRAVENOUS at 16:38

## 2023-02-04 RX ADMIN — RUXOLITINIB 15 MG: 15 TABLET ORAL at 23:05

## 2023-02-04 RX ADMIN — IOPAMIDOL 85 ML: 612 INJECTION, SOLUTION INTRAVENOUS at 15:17

## 2023-02-04 RX ADMIN — HYDROMORPHONE HYDROCHLORIDE 0.5 MG: 1 INJECTION, SOLUTION INTRAMUSCULAR; INTRAVENOUS; SUBCUTANEOUS at 16:38

## 2023-02-04 RX ADMIN — PANTOPRAZOLE SODIUM 8 MG/HR: 40 INJECTION, POWDER, FOR SOLUTION INTRAVENOUS at 21:20

## 2023-02-04 RX ADMIN — NYSTATIN 500000 UNITS: 100000 SUSPENSION ORAL at 21:21

## 2023-02-04 NOTE — ED PROVIDER NOTES
EMERGENCY DEPARTMENT ENCOUNTER    Room Number:  35/35  Date seen:  2/4/2023  PCP: Kristi Moreau APRN      HPI:  Chief Complaint: Abdominal pain  A complete HPI/ROS/PMH/PSH/SH/FH are unobtainable due to: None  Context: Catherine Boyer is a 82 y.o. female who presents to the ED c/o abdominal pain.  Is been ongoing for 3 days.  It is constant.  She reports having associated nausea, vomiting and black stool.        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   • Paroxysmal atrial fibrillation (HCC) 07/14/2022   • Myeloproliferative disorder (HCC) 07/14/2022   • Microcytic anemia 07/14/2022   • Type 2 diabetes mellitus, without long-term current use of insulin (HCC) 07/14/2022   • GERD (gastroesophageal reflux disease)    • Hypertension    • Personal history of transient ischemic attack (TIA), 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 deficits 09/04/2022   • COVID 10/29/2022   • Porcelain gallbladder    • Breast cancer (HCC)    • Chronic diastolic (congestive) heart failure (HCC)    • Cholecystitis 11/22/2022   • Urinary tract infection without hematuria, site unspecified 01/23/2023   • Epigastric abdominal pain 01/24/2023   • Gastritis 01/26/2023     Resolved Ambulatory Problems     Diagnosis Date Noted   • Acute on chronic diastolic heart failure (HCC) 07/14/2022   • Chest pain with high risk for cardiac etiology 08/31/2022   • Acute UTI (urinary tract infection) 11/21/2022     Past Medical History:   Diagnosis Date   • Atrial fibrillation (HCC)    • Carotid atherosclerosis    • Glaucoma    • History of cataract    • PUD (peptic ulcer disease)    • TIA (transient ischemic attack)    • Type 2 diabetes mellitus (HCC)    • Upper GI bleed          PAST SURGICAL HISTORY  Past Surgical History:   Procedure Laterality Date   • BREAST LUMPECTOMY  1999   • CARDIAC CATHETERIZATION N/A 09/02/2022     "Procedure: Coronary angiography;  Surgeon: Guero Verde MD;  Location: [facility] CATH INVASIVE LOCATION;  Service: Cardiovascular;  Laterality: N/A;   • CARDIAC CATHETERIZATION N/A 09/02/2022    Procedure: Stent LEFTY coronary;  Surgeon: Guero Verde MD;  Location: [facility] CATH INVASIVE LOCATION;  Service: Cardiovascular;  Laterality: N/A;   • CATARACT EXTRACTION  2011   • CHOLECYSTECTOMY WITH INTRAOPERATIVE CHOLANGIOGRAM N/A 11/28/2022    Procedure: CHOLECYSTECTOMY LAPAROSCOPIC INTRAOPERATIVE CHOLANGIOGRAM;  Surgeon: Dani Grover Jr., MD;  Location: [facility] MAIN OR;  Service: General;  Laterality: N/A;   • ENDOSCOPY N/A 1/25/2023    Procedure: ESOPHAGOGASTRODUODENOSCOPY WITH BIOPSIES;  Surgeon: Charles Diaz MD;  Location: [facility] ENDOSCOPY;  Service: Gastroenterology;  Laterality: N/A;  pre: abd pain, nausea  post: hiatal hernia, mild gastritis, sloughing of the esophagus   • JOINT REPLACEMENT           FAMILY HISTORY  Family History   Problem Relation Age of Onset   • Ovarian cancer Mother    • Lung cancer Father    • Lung cancer Sister    • Malig Hyperthermia Neg Hx          SOCIAL HISTORY  Social History     Socioeconomic History   • Marital status:    Tobacco Use   • Smoking status: Former     Packs/day: 1.00     Years: 20.00     Pack years: 20.00     Types: Cigarettes   • Smokeless tobacco: Never   • Tobacco comments:     30  years ago   Vaping Use   • Vaping Use: Never used   Substance and Sexual Activity   • Alcohol use: Yes     Comment: \"rare\"   • Drug use: Never   • Sexual activity: Defer         ALLERGIES  Aspirin, Oxycodone, and Diphenhydramine hcl        REVIEW OF SYSTEMS  Review of Systems     All systems reviewed and negative except for those discussed in HPI.       PHYSICAL EXAM  ED Triage Vitals [02/04/23 1346]   Temp Heart Rate Resp BP SpO2   99.2 °F (37.3 °C) 84 17 107/48 94 %      Temp src Heart Rate Source Patient Position BP Location FiO2 (%)   Oral Monitor -- -- -- "       Physical Exam      GENERAL: no acute distress  HENT: nares patent  EYES: no scleral icterus  CV: regular rhythm, normal rate  RESPIRATORY: normal effort  ABDOMEN: soft, generalized abdominal tenderness  MUSCULOSKELETAL: no deformity  NEURO: alert, moves all extremities, follows commands  PSYCH:  calm, cooperative  SKIN: warm, dry    Vital signs and nursing notes reviewed.          LAB RESULTS  Recent Results (from the past 24 hour(s))   Comprehensive Metabolic Panel    Collection Time: 02/04/23  2:19 PM    Specimen: Blood   Result Value Ref Range    Glucose 119 (H) 65 - 99 mg/dL    BUN 13 8 - 23 mg/dL    Creatinine 0.76 0.57 - 1.00 mg/dL    Sodium 136 136 - 145 mmol/L    Potassium 4.3 3.5 - 5.2 mmol/L    Chloride 100 98 - 107 mmol/L    CO2 26.0 22.0 - 29.0 mmol/L    Calcium 8.6 8.6 - 10.5 mg/dL    Total Protein 6.3 6.0 - 8.5 g/dL    Albumin 4.1 3.5 - 5.2 g/dL    ALT (SGPT) 52 (H) 1 - 33 U/L    AST (SGOT) 35 (H) 1 - 32 U/L    Alkaline Phosphatase 194 (H) 39 - 117 U/L    Total Bilirubin 0.6 0.0 - 1.2 mg/dL    Globulin 2.2 gm/dL    A/G Ratio 1.9 g/dL    BUN/Creatinine Ratio 17.1 7.0 - 25.0    Anion Gap 10.0 5.0 - 15.0 mmol/L    eGFR 77.4 >60.0 mL/min/1.73   Lipase    Collection Time: 02/04/23  2:19 PM    Specimen: Blood   Result Value Ref Range    Lipase 10 (L) 13 - 60 U/L   Type & Screen    Collection Time: 02/04/23  2:19 PM    Specimen: Blood   Result Value Ref Range    ABO Type O     RH type Positive     Antibody Screen Negative     T&S Expiration Date 2/7/2023 11:59:59 PM    Protime-INR    Collection Time: 02/04/23  2:19 PM    Specimen: Blood   Result Value Ref Range    Protime 15.0 (H) 11.7 - 14.2 Seconds    INR 1.16 (H) 0.90 - 1.10   aPTT    Collection Time: 02/04/23  2:19 PM    Specimen: Blood   Result Value Ref Range    PTT 41.2 (H) 22.7 - 35.4 seconds   CBC Auto Differential    Collection Time: 02/04/23  2:19 PM    Specimen: Blood   Result Value Ref Range    WBC 56.36 (C) 3.40 - 10.80 10*3/mm3    RBC  1.90 (L) 3.77 - 5.28 10*6/mm3    Hemoglobin 6.1 (C) 12.0 - 15.9 g/dL    Hematocrit 18.4 (C) 34.0 - 46.6 %    MCV 96.8 79.0 - 97.0 fL    MCH 32.1 26.6 - 33.0 pg    MCHC 33.2 31.5 - 35.7 g/dL    RDW 15.1 12.3 - 15.4 %    RDW-SD 51.7 37.0 - 54.0 fl    MPV 11.2 6.0 - 12.0 fL    Platelets 385 140 - 450 10*3/mm3   Manual Differential    Collection Time: 02/04/23  2:19 PM    Specimen: Blood   Result Value Ref Range    Neutrophil % 82.0 (H) 42.7 - 76.0 %    Lymphocyte % 2.0 (L) 19.6 - 45.3 %    Monocyte % 11.0 5.0 - 12.0 %    Metamyelocyte % 3.0 (H) 0.0 - 0.0 %    Myelocyte % 2.0 (H) 0.0 - 0.0 %    Neutrophils Absolute 46.22 (H) 1.70 - 7.00 10*3/mm3    Lymphocytes Absolute 1.13 0.70 - 3.10 10*3/mm3    Monocytes Absolute 6.20 (H) 0.10 - 0.90 10*3/mm3    Anisocytosis Mod/2+ None Seen    Dacrocytes Slight/1+ None Seen    Microcytes Slight/1+ None Seen    Stomatocytes Slight/1+ None Seen    WBC Morphology Normal Normal    Platelet Morphology Normal Normal   Prepare RBC, 1 Units    Collection Time: 02/04/23  4:07 PM   Result Value Ref Range    Product Code [id number]     Unit Number [id number]     UNIT  ABO O     UNIT  RH POS     Crossmatch Interpretation Compatible     Dispense Status IS     Blood Expiration Date [id number]     Blood Type Barcode 5100        Ordered the above labs and reviewed the results.        RADIOLOGY  CT Abdomen Pelvis With Contrast    Result Date: 2/4/2023  CT ABDOMEN AND PELVIS WITH IV CONTRAST  HISTORY: Abdominal pain. Black stool.  TECHNIQUE:  CT includes axial imaging from the lung bases to the trochanters with intravenous contrast and without use of oral contrast. Data reconstructed in coronal and sagittal planes. Radiation dose reduction techniques were utilized, including automated exposure control and exposure modulation based on body size.  COMPARISON: MRI abdomen 01/25/2023, CT abdomen and pelvis with IV contrast 01/23/2023, CT abdomen and pelvis with IV contrast 10/29/2022.  FINDINGS:  Heart size is enlarged. Lung bases appear clear. Liver, spleen, and adrenal glands appear within normal limits. 1.2 cm right upper pole renal low-density lesion that contains fat density is consistent with angiomyolipoma and this is without change. There is a posterior left renal low-density lesion measuring 9 mm and this is most likely a cyst. Pancreas appears within normal limits. There is no bowel dilatation or evidence for bowel obstruction. Sigmoid diverticulosis is present without evidence for diverticulitis. There is no evidence for intra-abdominal abscess formation. There is advanced multilevel degenerative disc disease within the lower thoracic spine and lumbar spine associated with vacuum phenomena.      1. No evidence for acute abnormality of the abdomen or pelvis. 2. Small hiatal hernia. 3. 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 Suspect small left renal cyst. 4. Previous cholecystectomy. 5. Sigmoid diverticulosis without evidence for diverticulitis.  Radiation dose reduction techniques were utilized, including automated exposure control and exposure modulation based on body size.  This report was finalized on 2/4/2023 4:13 PM by Dr. Torey Bedoya M.D.        Ordered the above noted radiological studies. Reviewed by me in PACS.          PROCEDURES  Critical Care  Performed by: Jamel Emmanuel II, MD  Authorized by: Jaeml Emmanuel II, MD     Critical care provider statement:     Critical care time (minutes):  33    Critical care was necessary to treat or prevent imminent or life-threatening deterioration of the following conditions:  Circulatory failure    Critical care was time spent personally by me on the following activities:  Ordering and performing treatments and interventions, ordering and review of laboratory studies, ordering and review of radiographic studies, pulse oximetry, re-evaluation of patient's condition, review of old charts, obtaining history from patient or  surrogate, discussions with consultants, evaluation of patient's response to treatment, examination of patient and development of treatment plan with patient or surrogate              MEDICATIONS GIVEN IN ER  Medications   sodium chloride 0.9 % flush 10 mL (has no administration in time range)   HYDROmorphone (DILAUDID) injection 0.5 mg (has no administration in time range)   ondansetron (ZOFRAN) injection 4 mg (has no administration in time range)   pantoprazole (PROTONIX) injection 40 mg (has no administration in time range)   pantoprazole (PROTONIX) 40 mg in 100 mL NS (VTB) (has no administration in time range)   iopamidol (ISOVUE-300) 61 % injection 85 mL (85 mL Intravenous Given 2/4/23 1517)         MEDICAL DECISION MAKING, PROGRESS, and CONSULTS    All labs have been independently reviewed by me.  All radiology studies have been reviewed by me and discussed with radiologist dictating the report.   EKG's independently viewed and interpreted by me.  Discussion below represents my analysis of pertinent findings related to patient's condition, differential diagnosis, treatment plan and final disposition.      Orders placed during this visit:  Orders Placed This Encounter   Procedures   • Critical Care   • CT Abdomen Pelvis With Contrast   • Comprehensive Metabolic Panel   • Lipase   • Urinalysis With Microscopic If Indicated (No Culture) - Urine, Clean Catch   • Protime-INR   • aPTT   • CBC Auto Differential   • Manual Differential   • Monitor Blood Pressure   • Cardiac Monitoring   • Pulse Oximetry, Continuous   • Verify Informed Consent   • [facility] (on-call MD unless specified) Details   • Gastroenterology (on-call MD unless specified)   • Type & Screen   • Prepare RBC, 1 Units   • Insert Peripheral IV   • Inpatient Admission   • CBC & Differential           Differential diagnosis:    Differential diagnosis includes but not limited to:  - hepatobiliary pathology such as cholecystitis, cholangitis, and symptomatic  cholelithiasis  - Pancreatitis  - Dyspepsia  - Small bowel obstruction  - Appendicitis  - Diverticulitis  - UTI including pyelonephritis  - Ureteral stone  - Zoster  - Colitis, including infectious and ischemic  - Atypical ACS          Independent interpretation of labs, radiology studies, and discussions with consultants:  ED Course as of 02/04/23 1627   Sat Feb 04, 2023   1621 Hemoglobin(!!): 6.1  Blood transfusion has been ordered [TD]   1622 WBC(!!): 56.36  Chronically elevated as patient has known myelodysplasia [TD]   1622 Creatinine: 0.76 [TD]   1622 Sodium: 136 [TD]   1622 Potassium: 4.3 [TD]   1625 Patient has had no bloody bowel movements while in the emergency department. [TD]   1626 I discussed the case with Dr. Kumar, hospitalist.  We reviewed patient's labs, history, imaging.  Gastroenterology consult is pending at the time of admission. [TD]      ED Course User Index  [TD] Jamel Emmanuel II, MD                  PPE: The patient wore a mask throughout the entire encounter. I wore a well-fitting mask.    DIAGNOSIS  Final diagnoses:   ABLA (acute blood loss anemia)   Gastrointestinal hemorrhage, unspecified gastrointestinal hemorrhage type   Chronic anticoagulation         DISPOSITION  Admit      Latest Documented Vital Signs:  As of 16:27 EST  BP- 107/48 HR- 84 Temp- 99.2 °F (37.3 °C) (Oral) O2 sat- 94%      --    Please note that portions of this were completed with a voice recognition program.       Note Disclaimer: At [facility], we believe that sharing information builds trust and better relationships. You are receiving this note because you are receiving care at [facility] or recently visited. It is possible you will see health information before a provider has talked with you about it. This kind of information can be easy to misunderstand. To help you fully understand what it means for your health, we urge you to discuss this note with your provider.       Jamel Emmanuel II,  MD  02/04/23 1198

## 2023-02-04 NOTE — ED NOTES
"Nursing report ED to floor  Catherine Boyer  82 y.o.  female    HPI :   Chief Complaint   Patient presents with    Abdominal Pain       Admitting doctor:   Jaimee Macias MD    Admitting diagnosis:   The primary encounter diagnosis was ABLA (acute blood loss anemia). Diagnoses of Gastrointestinal hemorrhage, unspecified gastrointestinal hemorrhage type and Chronic anticoagulation were also pertinent to this visit.    Code status:   Current Code Status       Date Active Code Status Order ID Comments User Context       Prior            Allergies:   Aspirin, Oxycodone, and Diphenhydramine hcl    Isolation:   No active isolations    Intake and Output  No intake or output data in the 24 hours ending 02/04/23 1645    Weight:       02/04/23  1422   Weight: 66.7 kg (147 lb)       Most recent vitals:   Vitals:    02/04/23 1346 02/04/23 1422 02/04/23 1628   BP: 107/48  147/76   Pulse: 84  92   Resp: 17  14   Temp: 99.2 °F (37.3 °C)  98.6 °F (37 °C)   TempSrc: Oral     SpO2: 94%  94%   Weight:  66.7 kg (147 lb)    Height:  154.9 cm (61\")        Active LDAs/IV Access:   Lines, Drains & Airways       Active LDAs       Name Placement date Placement time Site Days    Peripheral IV 02/04/23 1418 Right Antecubital 02/04/23  1418  Antecubital  less than 1                    Labs (abnormal labs have a star):   Labs Reviewed   COMPREHENSIVE METABOLIC PANEL - Abnormal; Notable for the following components:       Result Value    Glucose 119 (*)     ALT (SGPT) 52 (*)     AST (SGOT) 35 (*)     Alkaline Phosphatase 194 (*)     All other components within normal limits    Narrative:     GFR Normal >60  Chronic Kidney Disease <60  Kidney Failure <15    The GFR formula is only valid for adults with stable renal function between ages 18 and 70.   LIPASE - Abnormal; Notable for the following components:    Lipase 10 (*)     All other components within normal limits   PROTIME-INR - Abnormal; Notable for the following components:    Protime " 15.0 (*)     INR 1.16 (*)     All other components within normal limits   APTT - Abnormal; Notable for the following components:    PTT 41.2 (*)     All other components within normal limits   CBC WITH AUTO DIFFERENTIAL - Abnormal; Notable for the following components:    WBC 56.36 (*)     RBC 1.90 (*)     Hemoglobin 6.1 (*)     Hematocrit 18.4 (*)     All other components within normal limits   MANUAL DIFFERENTIAL - Abnormal; Notable for the following components:    Neutrophil % 82.0 (*)     Lymphocyte % 2.0 (*)     Metamyelocyte % 3.0 (*)     Myelocyte % 2.0 (*)     Neutrophils Absolute 46.22 (*)     Monocytes Absolute 6.20 (*)     All other components within normal limits   URINALYSIS W/ MICROSCOPIC IF INDICATED (NO CULTURE)   TYPE AND SCREEN   PREPARE RBC   CBC AND DIFFERENTIAL    Narrative:     The following orders were created for panel order CBC & Differential.  Procedure                               Abnormality         Status                     ---------                               -----------         ------                     CBC Auto Differential[[id number]]        Abnormal            Final result                 Please view results for these tests on the individual orders.       EKG:   No orders to display       Meds given in ED:   Medications   sodium chloride 0.9 % flush 10 mL (has no administration in time range)   pantoprazole (PROTONIX) injection 40 mg (has no administration in time range)   pantoprazole (PROTONIX) 40 mg in 100 mL NS (VTB) (has no administration in time range)   iopamidol (ISOVUE-300) 61 % injection 85 mL (85 mL Intravenous Given 2/4/23 1517)   HYDROmorphone (DILAUDID) injection 0.5 mg (0.5 mg Intravenous Given 2/4/23 1638)   ondansetron (ZOFRAN) injection 4 mg (4 mg Intravenous Given 2/4/23 1638)       Imaging results:  CT Abdomen Pelvis With Contrast    Result Date: 2/4/2023  1. No evidence for acute abnormality of the abdomen or pelvis. 2. Small hiatal hernia. 3. Right upper  "pole renal 1.2 cm angiomyolipoma. Suspect small left renal cyst. 4. Previous cholecystectomy. 5. Sigmoid diverticulosis without evidence for diverticulitis.  Radiation dose reduction techniques were utilized, including automated exposure control and exposure modulation based on body size.  This report was finalized on 2/4/2023 4:13 PM by Dr. Torey Bedoya M.D.       Ambulatory status:   - assist x 2      Social issues:   Social History     Socioeconomic History    Marital status:    Tobacco Use    Smoking status: Former     Packs/day: 1.00     Years: 20.00     Pack years: 20.00     Types: Cigarettes    Smokeless tobacco: Never    Tobacco comments:     30  years ago   Vaping Use    Vaping Use: Never used   Substance and Sexual Activity    Alcohol use: Yes     Comment: \"rare\"    Drug use: Never    Sexual activity: Defer       NIH Stroke Scale:         Jeevan Freeman RN  02/04/23 16:45 EST        "

## 2023-02-04 NOTE — CONSULTS
Chief Complaint   Patient presents with   • Abdominal Pain       Catherine Boyer is a 82 y.o. female who presents with abdominal pain    HPI  Mrs. Boyer is an 82 yoF w h/o CAD (stents 2/2022) on Plavix, Afib on Eliquis, chronic diastolic HF, myeloproliferative disorder/anemia following with Dr. Reinoso of Hematology on Jakafi (chronically elevated WBC) who presents with abdominal pain and black stools.  She was noted to have Hb 6.1 down from 7.5 at time of discharge on 1/26.   She reports she has had black loose stools since discharge.  She reports she was doing well otherwise until yesterday.  She reports eating [employer] chili and had central abdominal pain and nausea.  Pain radiated to her back.  She is afraid to eat due to pain.   She was admitted last month for abd pain, nausea worse with eating with pain radiating to back. She had  h/o porcelain GB and is s/p cholecystectomy with negative IOC 11/28/2022.  AST/ALT/TBili normal last admission, although her ALP is elevated and she has had periodic ALP elevations in the past ( in June 2021).  CT abd/pelvis with some biliary dilation that may be related to post-CCY status, moderate HH, normal appearing pancreas, mild splenomegaly.  MR abd w MRCP on 1/25 showed minimal intrahepatic biliary prominence, CBD normal caliber and distal tapering without intraluminal filling defects, cystic duct remnant unremarkable, no PD dilatation, 5 mm lesion in body of pancreas possibly representing IPMN, some periportal enhancement.  EGD last visit with sloughing of mucosa in esophagus (bx: candida), 3 cm HH, mild gastritis (bx no H pylori), normal duodenum (bx: normal).  CT abd/pelvis w contrast this admission with no acute findings.  She c/o black stools.  BUN is normal.   Lipase normal.  WBC 56 (myeloproliferative disorder) with normal platelets.   Past Medical History:   Diagnosis Date   • Atrial fibrillation (HCC)    • Breast cancer (HCC)    • CAD (coronary artery disease)      NSTEMI 2/2022: 90% ostial LAD, 99% D1, 70% mid-distal LAD (medical therapy). She received two stents (2.5x18 and 2.5x26mm [employer] LEFTY) but I don't know which one went to which lesion.   • Carotid atherosclerosis    • Chronic diastolic (congestive) heart failure (HCC)    • GERD (gastroesophageal reflux disease)    • Glaucoma    • History of cataract    • Hypertension    • Microcytic anemia     per Dr. oleg pate office  note 6/30/22-dd   • Myeloproliferative disorder (HCC)     JAK2 positive   • Nonbacterial thrombotic endocarditis     6/2021: 4x5mm vegetation on the ventricular surface of the anterior MV, negative blood cultures   • Porcelain gallbladder    • PUD (peptic ulcer disease)    • TIA (transient ischemic attack)    • Type 2 diabetes mellitus (HCC)    • Upper GI bleed        Past Surgical History:   Procedure Laterality Date   • BREAST LUMPECTOMY  1999   • CARDIAC CATHETERIZATION N/A 09/02/2022    Procedure: Coronary angiography;  Surgeon: Guero Verde MD;  Location: [facility] CATH INVASIVE LOCATION;  Service: Cardiovascular;  Laterality: N/A;   • CARDIAC CATHETERIZATION N/A 09/02/2022    Procedure: Stent LEFTY coronary;  Surgeon: Guero Verde MD;  Location: [facility] CATH INVASIVE LOCATION;  Service: Cardiovascular;  Laterality: N/A;   • CATARACT EXTRACTION  2011   • CHOLECYSTECTOMY WITH INTRAOPERATIVE CHOLANGIOGRAM N/A 11/28/2022    Procedure: CHOLECYSTECTOMY LAPAROSCOPIC INTRAOPERATIVE CHOLANGIOGRAM;  Surgeon: Dani Grover Jr., MD;  Location[facility] OR;  Service: General;  Laterality: N/A;   • ENDOSCOPY N/A 1/25/2023    Procedure: ESOPHAGOGASTRODUODENOSCOPY WITH BIOPSIES;  Surgeon: Charles Diaz MD;  Location: [facility] ENDOSCOPY;  Service: Gastroenterology;  Laterality: N/A;  pre: abd pain, nausea  post: hiatal hernia, mild gastritis, sloughing of the esophagus   • JOINT REPLACEMENT           Current Facility-Administered Medications:   •  pantoprazole (PROTONIX) 40 mg in 100 mL NS (VTB), 8  mg/hr, Intravenous, Continuous, Jamel Emmanuel II, MD, Last Rate: 20 mL/hr at 02/04/23 1738, 8 mg/hr at 02/04/23 1738  •  [COMPLETED] Insert Peripheral IV, , , Once **AND** sodium chloride 0.9 % flush 10 mL, 10 mL, Intravenous, PRN, Jamel Emmanuel II, MD    Current Outpatient Medications:   •  acetaminophen (TYLENOL) 500 MG tablet, Take 500 mg by mouth As Needed., Disp: , Rfl:   •  apixaban (Eliquis) 5 MG tablet tablet, Take 1 tablet by mouth Every 12 (Twelve) Hours., Disp: 180 tablet, Rfl: 3  •  atorvastatin (LIPITOR) 80 MG tablet, Take 1 tablet by mouth Every Night., Disp: 90 tablet, Rfl: 0  •  bimatoprost (LUMIGAN) 0.01 % ophthalmic drops, Apply 1 drop to eye(s) as directed by provider., Disp: , Rfl:   •  clopidogrel (PLAVIX) 75 MG tablet, Take 1 tablet by mouth Daily., Disp: 30 tablet, Rfl: 0  •  empagliflozin (JARDIANCE) 10 MG tablet tablet, Take 1 tablet by mouth Daily., Disp: 90 tablet, Rfl: 3  •  furosemide (LASIX) 40 MG tablet, Take 1 tablet by mouth Daily., Disp: 90 tablet, Rfl: 3  •  Jakafi 15 MG chemo tablet, Take 1 tablet by mouth 2 (Two) Times a Day., Disp: 60 tablet, Rfl: 3  •  latanoprost (XALATAN) 0.005 % ophthalmic solution, Administer 1 drop to both eyes., Disp: , Rfl:   •  metoprolol succinate XL (TOPROL-XL) 25 MG 24 hr tablet, 25 mg 2 (Two) Times a Day., Disp: , Rfl:   •  nystatin (MYCOSTATIN) 100,000 unit/mL suspension, Swish and swallow 5 mL 4 (Four) Times a Day for 14 days., Disp: 60 mL, Rfl: 0  •  ondansetron (ZOFRAN) 4 MG tablet, Take 1 tablet by mouth Every 6 (Six) Hours As Needed for Nausea or Vomiting., Disp: 30 tablet, Rfl: 0  •  pantoprazole (PROTONIX) 40 MG EC tablet, Take 40 mg by mouth Daily., Disp: , Rfl:   •  sertraline (ZOLOFT) 50 MG tablet, Take 50 mg by mouth Daily., Disp: , Rfl:   •  zolpidem (Ambien) 5 MG tablet, Take 1 tablet by mouth At Night As Needed for Sleep (Bring to the sleep lab, do not take at home.). (Patient taking differently: Take 5 mg by mouth At  "Night As Needed for Sleep (Bring to the sleep lab, do not take at home.). Only 1x dose for sleep study in feb), Disp: 1 tablet, Rfl: 0    Allergies   Allergen Reactions   • Aspirin Unknown - Low Severity   • Oxycodone Unknown - Low Severity   • Diphenhydramine Hcl Anxiety and Unknown (See Comments)     Benadryl IVP       Social History     Socioeconomic History   • Marital status:    Tobacco Use   • Smoking status: Former     Packs/day: 1.00     Years: 20.00     Pack years: 20.00     Types: Cigarettes   • Smokeless tobacco: Never   • Tobacco comments:     30  years ago   Vaping Use   • Vaping Use: Never used   Substance and Sexual Activity   • Alcohol use: Yes     Comment: \"rare\"   • Drug use: Never   • Sexual activity: Defer       Family History   Problem Relation Age of Onset   • Ovarian cancer Mother    • Lung cancer Father    • Lung cancer Sister    • Malig Hyperthermia Neg Hx        Review of Systems   Constitutional: Negative for chills and fever.   Respiratory: Negative for cough and shortness of breath.    Gastrointestinal: Positive for abdominal pain, blood in stool and nausea. Negative for abdominal distention and vomiting.   Skin: Negative for color change and rash.   All other systems reviewed and are negative.      Vitals:    02/04/23 1645   BP: 123/59   Pulse: 97   Resp: 12   Temp: 98.8 °F (37.1 °C)   SpO2: 99%       Physical Exam   General: Patient awake, alert and cooperative   Eyes: Normal lids and lashes, no scleral icterus   Neck: Supple, normal ROM   Skin: Warm and dry, not jaundiced   Cardiovascular: Regular rate, regular rhythm, well-perfused extremities   Pulm: Equal expansion bilaterally, no increased WOB   Abdomen: Soft, nontender, nondistended;    Extremities: No rash or edema              Neuro: A&O, no obvious sign of focal deficit   Psychiatric: Normal mood and behavior    CT Abdomen Pelvis With Contrast    Result Date: 2/4/2023  1. No evidence for acute abnormality of the abdomen " or pelvis. 2. Small hiatal hernia. 3. 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 Suspect small left renal cyst. 4. Previous cholecystectomy. 5. Sigmoid diverticulosis without evidence for diverticulitis.  Radiation dose reduction techniques were utilized, including automated exposure control and exposure modulation based on body size.  This report was finalized on 2/4/2023 4:13 PM by Dr. Torey Bedoya M.D.      Impression:  Melena  Acute on chronic anemia  Abdominal pain  Elevated ALP (Intermittent since June 2021)  Elevated LFTs  S/p CCY 11/2022  Myeloproliferative Disorder with chronic elevation of WBC and anemia on Jakafi (JAVIER inhibitor)  CAD on Plavix (stents 2/2022)  Afib on Eliquis  Candida Esophagitis    Plan:  - MR abd w MRCP on 1/25 showed minimal intrahepatic biliary prominence, CBD normal caliber and distal tapering without intraluminal filling defects, cystic duct remnant unremarkable, no PD dilatation, 5 mm lesion in body of pancreas possibly representing IPMN, some periportal enhancement.    - EGD 1/25 with sloughing of mucosa in esophagus (bx: candida), 3 cm HH, mild gastritis (bx no H pylori), normal duodenum (bx: normal).  - Fluconazole has too many interactions with her medications, recommend 14 days nystatin swish and swallow  - CT abd/pelvis w contrast this admission with no acute findings.    - IV PPI   - Monitor H/H, transfuse for Hb<7  - Baseline Hb around 8 since November   - She had recent EGD as noted above without ulcers or lesions high risk for bleeding, may have oozed from biopsy sites as she reports she has had black loose stool since discharge last month  - Hold AC and Plavix (would recommend evaluating continued need for Plavix given stents were 1 year ago in 2/2022)   - CLD, NPO at midnight  - Mesenteric duplex in AM   - Pending clinical course, may consider colonoscopy +/- EGD/push enteroscopy when she is off Plavix for 5 days.  She ideally does not want to have colonoscopy  but is amenable if absolutely needed  - Continue to monitor CMP      Charles Diaz MD

## 2023-02-04 NOTE — ED NOTES
Pt presents via EMS from [facility] with cc Abdominal Pain/N/V and was dx with gastroenteritis, also complains of dark stools and she is on a blood thinner. Pt is ambulatory at baseline, GCS 15. FSBS 172.

## 2023-02-05 ENCOUNTER — APPOINTMENT (OUTPATIENT)
Dept: CARDIOLOGY | Facility: HOSPITAL | Age: 83
DRG: 378 | End: 2023-02-05
Payer: MEDICARE

## 2023-02-05 PROBLEM — E11.59 TYPE 2 DIABETES MELLITUS WITH CIRCULATORY DISORDER, WITHOUT LONG-TERM CURRENT USE OF INSULIN: Chronic | Status: ACTIVE | Noted: 2022-07-14

## 2023-02-05 PROBLEM — I70.0 ATHEROSCLEROSIS OF ABDOMINAL AORTA: Chronic | Status: ACTIVE | Noted: 2023-02-05

## 2023-02-05 LAB
ALBUMIN SERPL-MCNC: 3.7 G/DL (ref 3.5–5.2)
ALBUMIN/GLOB SERPL: 1.6 G/DL
ALP SERPL-CCNC: 171 U/L (ref 39–117)
ALT SERPL W P-5'-P-CCNC: 41 U/L (ref 1–33)
ANION GAP SERPL CALCULATED.3IONS-SCNC: 7.9 MMOL/L (ref 5–15)
AST SERPL-CCNC: 23 U/L (ref 1–32)
BH BB BLOOD EXPIRATION DATE: NORMAL
BH BB BLOOD TYPE BARCODE: 5100
BH BB DISPENSE STATUS: NORMAL
BH BB PRODUCT CODE: NORMAL
BH BB UNIT NUMBER: NORMAL
BILIRUB SERPL-MCNC: 0.9 MG/DL (ref 0–1.2)
BUN SERPL-MCNC: 11 MG/DL (ref 8–23)
BUN/CREAT SERPL: 15.5 (ref 7–25)
CALCIUM SPEC-SCNC: 8.2 MG/DL (ref 8.6–10.5)
CHLORIDE SERPL-SCNC: 104 MMOL/L (ref 98–107)
CO2 SERPL-SCNC: 24.1 MMOL/L (ref 22–29)
CREAT SERPL-MCNC: 0.71 MG/DL (ref 0.57–1)
CROSSMATCH INTERPRETATION: NORMAL
DEPRECATED RDW RBC AUTO: 52.2 FL (ref 37–54)
EGFRCR SERPLBLD CKD-EPI 2021: 84 ML/MIN/1.73
ERYTHROCYTE [DISTWIDTH] IN BLOOD BY AUTOMATED COUNT: 15.1 % (ref 12.3–15.4)
GLOBULIN UR ELPH-MCNC: 2.3 GM/DL
GLUCOSE BLDC GLUCOMTR-MCNC: 100 MG/DL (ref 70–130)
GLUCOSE BLDC GLUCOMTR-MCNC: 102 MG/DL (ref 70–130)
GLUCOSE BLDC GLUCOMTR-MCNC: 109 MG/DL (ref 70–130)
GLUCOSE BLDC GLUCOMTR-MCNC: 87 MG/DL (ref 70–130)
GLUCOSE SERPL-MCNC: 75 MG/DL (ref 65–99)
HCT VFR BLD AUTO: 21 % (ref 34–46.6)
HCT VFR BLD AUTO: 21.5 % (ref 34–46.6)
HCT VFR BLD AUTO: 21.7 % (ref 34–46.6)
HCT VFR BLD AUTO: 25.9 % (ref 34–46.6)
HCT VFR BLD AUTO: 27.6 % (ref 34–46.6)
HGB BLD-MCNC: 6.8 G/DL (ref 12–15.9)
HGB BLD-MCNC: 7.1 G/DL (ref 12–15.9)
HGB BLD-MCNC: 7.3 G/DL (ref 12–15.9)
HGB BLD-MCNC: 8.5 G/DL (ref 12–15.9)
HGB BLD-MCNC: 8.9 G/DL (ref 12–15.9)
MAGNESIUM SERPL-MCNC: 2 MG/DL (ref 1.6–2.4)
MCH RBC QN AUTO: 32 PG (ref 26.6–33)
MCHC RBC AUTO-ENTMCNC: 33 G/DL (ref 31.5–35.7)
MCV RBC AUTO: 96.8 FL (ref 79–97)
PLATELET # BLD AUTO: 334 10*3/MM3 (ref 140–450)
PMV BLD AUTO: 10.7 FL (ref 6–12)
POTASSIUM SERPL-SCNC: 3.6 MMOL/L (ref 3.5–5.2)
PROT SERPL-MCNC: 6 G/DL (ref 6–8.5)
RBC # BLD AUTO: 2.22 10*6/MM3 (ref 3.77–5.28)
SODIUM SERPL-SCNC: 136 MMOL/L (ref 136–145)
UNIT  ABO: NORMAL
UNIT  RH: NORMAL
WBC NRBC COR # BLD: 49.85 10*3/MM3 (ref 3.4–10.8)

## 2023-02-05 PROCEDURE — 86900 BLOOD TYPING SEROLOGIC ABO: CPT

## 2023-02-05 PROCEDURE — 86381 MITOCHONDRIAL ANTIBODY EACH: CPT | Performed by: INTERNAL MEDICINE

## 2023-02-05 PROCEDURE — 36430 TRANSFUSION BLD/BLD COMPNT: CPT

## 2023-02-05 PROCEDURE — 63710000001 RUXOLITINIB 15 MG TABLET: Performed by: INTERNAL MEDICINE

## 2023-02-05 PROCEDURE — 99222 1ST HOSP IP/OBS MODERATE 55: CPT | Performed by: INTERNAL MEDICINE

## 2023-02-05 PROCEDURE — 80053 COMPREHEN METABOLIC PANEL: CPT | Performed by: INTERNAL MEDICINE

## 2023-02-05 PROCEDURE — 25010000002 HYDROMORPHONE PER 4 MG: Performed by: NURSE PRACTITIONER

## 2023-02-05 PROCEDURE — 93010 ELECTROCARDIOGRAM REPORT: CPT | Performed by: INTERNAL MEDICINE

## 2023-02-05 PROCEDURE — 85027 COMPLETE CBC AUTOMATED: CPT | Performed by: INTERNAL MEDICINE

## 2023-02-05 PROCEDURE — 99232 SBSQ HOSP IP/OBS MODERATE 35: CPT | Performed by: INTERNAL MEDICINE

## 2023-02-05 PROCEDURE — 85014 HEMATOCRIT: CPT | Performed by: NURSE PRACTITIONER

## 2023-02-05 PROCEDURE — 25010000002 NA FERRIC GLUC CPLX PER 12.5 MG: Performed by: INTERNAL MEDICINE

## 2023-02-05 PROCEDURE — 99223 1ST HOSP IP/OBS HIGH 75: CPT | Performed by: INTERNAL MEDICINE

## 2023-02-05 PROCEDURE — 82962 GLUCOSE BLOOD TEST: CPT

## 2023-02-05 PROCEDURE — C9399 UNCLASSIFIED DRUGS OR BIOLOG: HCPCS | Performed by: INTERNAL MEDICINE

## 2023-02-05 PROCEDURE — 93975 VASCULAR STUDY: CPT

## 2023-02-05 PROCEDURE — 83735 ASSAY OF MAGNESIUM: CPT | Performed by: NURSE PRACTITIONER

## 2023-02-05 PROCEDURE — P9016 RBC LEUKOCYTES REDUCED: HCPCS

## 2023-02-05 PROCEDURE — 85018 HEMOGLOBIN: CPT | Performed by: NURSE PRACTITIONER

## 2023-02-05 PROCEDURE — 93005 ELECTROCARDIOGRAM TRACING: CPT | Performed by: INTERNAL MEDICINE

## 2023-02-05 RX ORDER — DEXTROSE MONOHYDRATE 25 G/50ML
25 INJECTION, SOLUTION INTRAVENOUS
Status: DISCONTINUED | OUTPATIENT
Start: 2023-02-05 | End: 2023-02-12 | Stop reason: HOSPADM

## 2023-02-05 RX ORDER — IBUPROFEN 600 MG/1
1 TABLET ORAL
Status: DISCONTINUED | OUTPATIENT
Start: 2023-02-05 | End: 2023-02-05 | Stop reason: SDUPTHER

## 2023-02-05 RX ORDER — INSULIN LISPRO 100 [IU]/ML
0-7 INJECTION, SOLUTION INTRAVENOUS; SUBCUTANEOUS
Status: DISCONTINUED | OUTPATIENT
Start: 2023-02-05 | End: 2023-02-05 | Stop reason: SDUPTHER

## 2023-02-05 RX ORDER — DEXTROSE MONOHYDRATE 25 G/50ML
25 INJECTION, SOLUTION INTRAVENOUS
Status: DISCONTINUED | OUTPATIENT
Start: 2023-02-05 | End: 2023-02-05 | Stop reason: SDUPTHER

## 2023-02-05 RX ORDER — INSULIN LISPRO 100 [IU]/ML
0-7 INJECTION, SOLUTION INTRAVENOUS; SUBCUTANEOUS
Status: DISCONTINUED | OUTPATIENT
Start: 2023-02-05 | End: 2023-02-12 | Stop reason: HOSPADM

## 2023-02-05 RX ORDER — NICOTINE POLACRILEX 4 MG
15 LOZENGE BUCCAL
Status: DISCONTINUED | OUTPATIENT
Start: 2023-02-05 | End: 2023-02-05 | Stop reason: SDUPTHER

## 2023-02-05 RX ORDER — HYDROMORPHONE HYDROCHLORIDE 1 MG/ML
0.5 INJECTION, SOLUTION INTRAMUSCULAR; INTRAVENOUS; SUBCUTANEOUS ONCE
Status: COMPLETED | OUTPATIENT
Start: 2023-02-05 | End: 2023-02-05

## 2023-02-05 RX ORDER — NICOTINE POLACRILEX 4 MG
15 LOZENGE BUCCAL
Status: DISCONTINUED | OUTPATIENT
Start: 2023-02-05 | End: 2023-02-12 | Stop reason: HOSPADM

## 2023-02-05 RX ORDER — IBUPROFEN 600 MG/1
1 TABLET ORAL
Status: DISCONTINUED | OUTPATIENT
Start: 2023-02-05 | End: 2023-02-12 | Stop reason: HOSPADM

## 2023-02-05 RX ADMIN — FUROSEMIDE 40 MG: 40 TABLET ORAL at 09:27

## 2023-02-05 RX ADMIN — NYSTATIN 500000 UNITS: 100000 SUSPENSION ORAL at 20:02

## 2023-02-05 RX ADMIN — LATANOPROST 1 DROP: 50 SOLUTION OPHTHALMIC at 20:04

## 2023-02-05 RX ADMIN — PANTOPRAZOLE SODIUM 8 MG/HR: 40 INJECTION, POWDER, FOR SOLUTION INTRAVENOUS at 21:12

## 2023-02-05 RX ADMIN — PANTOPRAZOLE SODIUM 8 MG/HR: 40 INJECTION, POWDER, FOR SOLUTION INTRAVENOUS at 02:14

## 2023-02-05 RX ADMIN — SODIUM CHLORIDE 250 MG: 9 INJECTION, SOLUTION INTRAVENOUS at 16:19

## 2023-02-05 RX ADMIN — EMPAGLIFLOZIN 10 MG: 10 TABLET, FILM COATED ORAL at 09:27

## 2023-02-05 RX ADMIN — PANTOPRAZOLE SODIUM 8 MG/HR: 40 INJECTION, POWDER, FOR SOLUTION INTRAVENOUS at 16:19

## 2023-02-05 RX ADMIN — HYDROMORPHONE HYDROCHLORIDE 0.5 MG: 1 INJECTION, SOLUTION INTRAMUSCULAR; INTRAVENOUS; SUBCUTANEOUS at 21:10

## 2023-02-05 RX ADMIN — HYDROMORPHONE HYDROCHLORIDE 0.5 MG: 1 INJECTION, SOLUTION INTRAMUSCULAR; INTRAVENOUS; SUBCUTANEOUS at 05:38

## 2023-02-05 RX ADMIN — NYSTATIN 500000 UNITS: 100000 SUSPENSION ORAL at 09:28

## 2023-02-05 RX ADMIN — SODIUM CHLORIDE 150 ML/HR: 9 INJECTION, SOLUTION INTRAVENOUS at 04:23

## 2023-02-05 RX ADMIN — RUXOLITINIB 15 MG: 15 TABLET ORAL at 09:28

## 2023-02-05 RX ADMIN — ATORVASTATIN CALCIUM 80 MG: 80 TABLET, FILM COATED ORAL at 20:02

## 2023-02-05 RX ADMIN — PANTOPRAZOLE SODIUM 8 MG/HR: 40 INJECTION, POWDER, FOR SOLUTION INTRAVENOUS at 11:32

## 2023-02-05 RX ADMIN — PANTOPRAZOLE SODIUM 8 MG/HR: 40 INJECTION, POWDER, FOR SOLUTION INTRAVENOUS at 06:45

## 2023-02-05 RX ADMIN — ACETAMINOPHEN 650 MG: 325 TABLET, FILM COATED ORAL at 18:56

## 2023-02-05 NOTE — PROGRESS NOTES
Name: Catherine Boyer ADMIT: 2023   : 1940  PCP: Kristi Moreau APRN    MRN: [id number] LOS: 1 days   AGE/SEX: 82 y.o. female  ROOM: [facility]     Subjective   Subjective     Patient is lying in bed and does not appear to be any major distress and she appears chronically ill.  Does complain of intermittent epigastric pain but denies nausea, vomiting.  He denies any chest pain or shortness of breath.       Objective   Objective   Vital Signs  Temp:  [98.1 °F (36.7 °C)-99.2 °F (37.3 °C)] 98.6 °F (37 °C)  Heart Rate:  [81-97] 86  Resp:  [12-18] 16  BP: (107-147)/(48-76) 116/62  SpO2:  [93 %-99 %] 95 %  on  Flow (L/min):  [2] 2;   Device (Oxygen Therapy): nasal cannula  Body mass index is 27.78 kg/m².  Physical Exam  Constitutional:       General: She is not in acute distress.     Appearance: She is ill-appearing.   HENT:      Head: Normocephalic and atraumatic.      Mouth/Throat:      Mouth: Mucous membranes are dry.   Eyes:      Extraocular Movements: Extraocular movements intact.      Pupils: Pupils are equal, round, and reactive to light.   Cardiovascular:      Rate and Rhythm: Normal rate and regular rhythm.      Pulses: Normal pulses.      Heart sounds: Normal heart sounds.   Pulmonary:      Effort: Pulmonary effort is normal. No respiratory distress.      Breath sounds: Normal breath sounds.   Abdominal:      General: Bowel sounds are normal. There is no distension.      Palpations: Abdomen is soft.      Comments: Mild tenderness in the epigastrium   Musculoskeletal:      Cervical back: Normal range of motion and neck supple.      Left lower leg: No edema.   Skin:     General: Skin is warm and dry.   Neurological:      General: No focal deficit present.      Mental Status: She is alert and oriented to person, place, and time.   Psychiatric:         Mood and Affect: Mood normal.         Behavior: Behavior normal.       Results Review     I reviewed the patient's new clinical results.  Results from last  7 days   Lab Units 02/05/23  0806 02/05/23  0356 02/04/23  2335 02/04/23  1419   WBC 10*3/mm3  --  49.85*  --  56.36*   HEMOGLOBIN g/dL 6.8* 7.1* 7.3* 6.1*   PLATELETS 10*3/mm3  --  334  --  385     Results from last 7 days   Lab Units 02/05/23  0356 02/04/23  1419   SODIUM mmol/L 136 136   POTASSIUM mmol/L 3.6 4.3   CHLORIDE mmol/L 104 100   CO2 mmol/L 24.1 26.0   BUN mg/dL 11 13   CREATININE mg/dL 0.71 0.76   GLUCOSE mg/dL 75 119*   EGFR mL/min/1.73 84.0 77.4     Results from last 7 days   Lab Units 02/05/23  0356 02/04/23  1419   ALBUMIN g/dL 3.7 4.1   BILIRUBIN mg/dL 0.9 0.6   ALK PHOS U/L 171* 194*   AST (SGOT) U/L 23 35*   ALT (SGPT) U/L 41* 52*     Results from last 7 days   Lab Units 02/05/23  0356 02/04/23  1419   CALCIUM mg/dL 8.2* 8.6   ALBUMIN g/dL 3.7 4.1   MAGNESIUM mg/dL 2.0  --        Glucose   Date/Time Value Ref Range Status   02/05/2023 1036 102 70 - 130 mg/dL Final     Comment:     Meter: [id number] : [id number] Alston Whitney [place]   02/05/2023 0610 109 70 - 130 mg/dL Final     Comment:     Meter: [id number] : [id number] Yuly BENTLEY       CT Abdomen Pelvis With Contrast    Result Date: 2/4/2023  1. No evidence for acute abnormality of the abdomen or pelvis. 2. Small hiatal hernia. 3. 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 Suspect small left renal cyst. 4. Previous cholecystectomy. 5. Sigmoid diverticulosis without evidence for diverticulitis.  Radiation dose reduction techniques were utilized, including automated exposure control and exposure modulation based on body size.  This report was finalized on 2/4/2023 4:13 PM by Dr. Torey Bedoya M.D.      I have personally reviewed all medications:  Scheduled Medications  atorvastatin, 80 mg, Oral, Nightly  empagliflozin, 10 mg, Oral, Daily  ferric gluconate, 250 mg, Intravenous, Once  furosemide, 40 mg, Oral, Daily  insulin lispro, 0-7 Units, Subcutaneous, TID AC  latanoprost, 1 drop, Both Eyes, Nightly  nystatin, 5 mL, Oral, 4x  Daily  ruxolitinib, 15 mg, Oral, BID    Infusions  pantoprazole, 8 mg/hr, Last Rate: 8 mg/hr (02/05/23 1132)  sodium chloride, 150 mL/hr, Last Rate: 150 mL/hr (02/05/23 0423)    Diet  NPO Diet NPO Type: Strict NPO    I have personally reviewed:  [x]  Laboratory   [x]  Microbiology   [x]  Radiology   [x]  EKG/Telemetry  [x]  Cardiology/Vascular   [x]  Pathology    [x]  Records       Assessment/Plan     Active Hospital Problems    Diagnosis  POA   • **ABLA (acute blood loss anemia) [D62]  Yes   • Atherosclerosis of abdominal aorta (HCC) [I70.0]  Yes   • Chronic diastolic (congestive) heart failure (HCC) [I50.32]  Yes   • Personal history of transient ischemic attack (TIA), 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 deficits [Z86.73]  Not Applicable   • CAD (coronary artery disease) [I25.10]  Yes   • Myeloproliferative disorder (HCC) [D47.1]  Yes   • Paroxysmal atrial fibrillation (HCC) [I48.0]  Yes   • Type 2 diabetes mellitus with circulatory disorder, 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       1. Acute blood loss anemia, hemoglobin is 6.8 today and is receiving 1 unit of PRBC.  Patient's Plavix and Eliquis are on hold.  GI did evaluate and plans on EGD and colonoscopy.  Currently on acid suppressive therapy which will be continued.    2.  History of atrial fibrillation, currently in normal sinus rhythm and Eliquis and Plavix have been held.  We will continue with p.o. metoprolol.    3.  History of coronary artery disease, currently chest pain-free and will continue with metoprolol and resume statins upon discharge.  Plavix is on hold.    4.  History of chronic diastolic heart failure, currently compensated and home dose Lasix on hold for the moment.  She will be gently volume resuscitated.    5.  Diabetes mellitus, currently on corrective dose insulin and home dose Jardiance is on hold.    6.  Known history of myeloproliferative  disorder, follows up with hematology oncology closely as an outpatient basis.    7.  On SCDs for DVT prophylaxis.    8.  CODE STATUS is full code.     Orlando Sewell MD  [facility]  02/05/23  13:38 EST

## 2023-02-05 NOTE — SIGNIFICANT NOTE
02/05/23 0936   OTHER   Discipline physical therapist   Rehab Time/Intention   Session Not Performed other (see comments)  (Per nsg, Hgb 6.8 hold this date. PT to follow up tomorrow.)   Recommendation   PT - Next Appointment 02/06/23

## 2023-02-05 NOTE — CONSULTS
Subjective     REASON FOR CONSULTATION:    Evaluation and management for myeloproliferative disease                             REQUESTING PHYSICIAN: Dr. Donato MD    RECORDS OBTAINED:  Records of the patients history including those obtained from the referring provider were reviewed and summarized in detail.    HISTORY OF PRESENT ILLNESS:  The patient is a 82 y.o. year old female with medical history significant for JAK2 positive myeloproliferative disorder, perhaps primary myelofibrosis, A-fib on chronic Eliquis, CAD with stents and on Plavix, history of endocarditis and TIA had presented to [facility] ER on 2/4/2023 with abdominal pain and black stools for several days.    Lab work revealed hemoglobin of 6.1, WBC count of 56.36 and platelet count of 385,000.  CT A/P noted no evidence of acute abnormality in the abdomen or pelvis.  Small hiatal hernia.  Previous cholecystectomy.  Sigmoid diverticulosis without evidence of diverticulitis.    Of note, patient was hospitalized around 2 weeks ago with abdominal pain.  MRCP on 1/25/2023 had shown minimal intrahepatic biliary prominence, no CBD/PD dilatation, 5 mm lesion in the body of pancreas possibly representing IPMN.      EGD performed 1/25/2023 had noted mild mucosal changes characterized by slowing in the lower third of the esophagus.  Patchy mild inflammation characterized by congestion and erythema found in the gastric body.  The examined duodenum was normal.    Patient has been seen by GI.  Started on PPI.  Eliquis and Plavix on hold.  Pending clinical course, plan to consider colonoscopy +/- EGD/push enteroscopy.    Hematology/oncology consulted for further evaluation and management.  Patient resting comfortably in bed.  Says she had a BM today which was dark brown in color.  Denies any bleeding from anywhere else.    Past Medical History:   Diagnosis Date   • Atrial fibrillation (HCC)    • Breast cancer (HCC)    • CAD (coronary artery  disease)     NSTEMI 2/2022: 90% ostial LAD, 99% D1, 70% mid-distal LAD (medical therapy). She received two stents (2.5x18 and 2.5x26mm [place] LEFTY) but I don't know which one went to which lesion.   • Carotid atherosclerosis    • Chronic diastolic (congestive) heart failure (HCC)    • GERD (gastroesophageal reflux disease)    • Glaucoma    • History of cataract    • Hypertension    • Microcytic anemia     per Dr. oleg pate office  note 6/30/22-dd   • Myeloproliferative disorder (HCC)     JAK2 positive   • Nonbacterial thrombotic endocarditis     6/2021: 4x5mm vegetation on the ventricular surface of the anterior MV, negative blood cultures   • Porcelain gallbladder    • PUD (peptic ulcer disease)    • TIA (transient ischemic attack)    • Type 2 diabetes mellitus (HCC)    • Upper GI bleed         Past Surgical History:   Procedure Laterality Date   • BREAST LUMPECTOMY  1999   • CARDIAC CATHETERIZATION N/A 09/02/2022    Procedure: Coronary angiography;  Surgeon: Guero Verde MD;  Location: [facility] CATH INVASIVE LOCATION;  Service: Cardiovascular;  Laterality: N/A;   • CARDIAC CATHETERIZATION N/A 09/02/2022    Procedure: Stent LEFTY coronary;  Surgeon: Guero Verde MD;  Location: [facility] CATH INVASIVE LOCATION;  Service: Cardiovascular;  Laterality: N/A;   • CATARACT EXTRACTION  2011   • CHOLECYSTECTOMY WITH INTRAOPERATIVE CHOLANGIOGRAM N/A 11/28/2022    Procedure: CHOLECYSTECTOMY LAPAROSCOPIC INTRAOPERATIVE CHOLANGIOGRAM;  Surgeon: Dani Grover Jr., MD;  Location: [facility] MAIN OR;  Service: General;  Laterality: N/A;   • ENDOSCOPY N/A 1/25/2023    Procedure: ESOPHAGOGASTRODUODENOSCOPY WITH BIOPSIES;  Surgeon: Charles Diaz MD;  Location: [facility] ENDOSCOPY;  Service: Gastroenterology;  Laterality: N/A;  pre: abd pain, nausea  post: hiatal hernia, mild gastritis, sloughing of the esophagus   • JOINT REPLACEMENT          No current facility-administered medications on file prior to encounter.     Current  "Outpatient Medications on File Prior to Encounter   Medication Sig Dispense Refill   • apixaban (Eliquis) 5 MG tablet tablet Take 1 tablet by mouth Every 12 (Twelve) Hours. 180 tablet 3   • atorvastatin (LIPITOR) 80 MG tablet Take 1 tablet by mouth Every Night. 90 tablet 0   • bimatoprost (LUMIGAN) 0.01 % ophthalmic drops Apply 1 drop to eye(s) as directed by provider.     • clopidogrel (PLAVIX) 75 MG tablet Take 1 tablet by mouth Daily. 30 tablet 0   • empagliflozin (JARDIANCE) 10 MG tablet tablet Take 1 tablet by mouth Daily. 90 tablet 3   • furosemide (LASIX) 40 MG tablet Take 1 tablet by mouth Daily. 90 tablet 3   • Jakafi 15 MG chemo tablet Take 1 tablet by mouth 2 (Two) Times a Day. 60 tablet 3   • latanoprost (XALATAN) 0.005 % ophthalmic solution Administer 1 drop to both eyes.     • metoprolol succinate XL (TOPROL-XL) 25 MG 24 hr tablet 25 mg 2 (Two) Times a Day.     • nystatin (MYCOSTATIN) 100,000 unit/mL suspension Swish and swallow 5 mL 4 (Four) Times a Day for 14 days. 60 mL 0   • ondansetron (ZOFRAN) 4 MG tablet Take 1 tablet by mouth Every 6 (Six) Hours As Needed for Nausea or Vomiting. 30 tablet 0   • pantoprazole (PROTONIX) 40 MG EC tablet Take 40 mg by mouth Daily.     • sertraline (ZOLOFT) 50 MG tablet Take 50 mg by mouth Daily.     • acetaminophen (TYLENOL) 500 MG tablet Take 500 mg by mouth As Needed.          ALLERGIES:    Allergies   Allergen Reactions   • Aspirin Unknown - Low Severity   • Oxycodone Unknown - Low Severity   • Diphenhydramine Hcl Anxiety and Unknown (See Comments)     Benadryl IVP        Social History     Socioeconomic History   • Marital status:    Tobacco Use   • Smoking status: Former     Packs/day: 1.00     Years: 20.00     Pack years: 20.00     Types: Cigarettes   • Smokeless tobacco: Never   • Tobacco comments:     30  years ago   Vaping Use   • Vaping Use: Never used   Substance and Sexual Activity   • Alcohol use: Yes     Comment: \"rare\"   • Drug use: Never   • " Sexual activity: Defer        Family History   Problem Relation Age of Onset   • Ovarian cancer Mother    • Lung cancer Father    • Lung cancer Sister    • Malig Hyperthermia Neg Hx         Review of Systems   GENERAL: No change in appetite or weight;   No fevers, chills, sweats.    SKIN: No nonhealing lesions.   No rashes.  HEME/LYMPH: No easy bruising, bleeding.   No swollen nodes.   EYES: No vision changes or diplopia.   ENT: No tinnitus, hearing loss, gum bleeding, epistaxis, hoarseness or dysphagia.   RESPIRATORY: No cough, shortness of breath, hemoptysis or wheezing.   CVS: No chest pain, palpitations, orthopnea, dyspnea on exertion or PND.   GI: No melena or hematochezia.   No abdominal pain.  No nausea, vomiting, constipation, diarrhea  : No lower tract obstructive symptoms, dysuria or hematuria.   MUSCULOSKELETAL: No bone pain.  No joint stiffness.   NEUROLOGICAL: No global weakness, loss of consciousness or seizures.   PSYCHIATRIC: No increased nervousness, mood changes or depression.     Objective     Vitals:    02/04/23 2315 02/05/23 0320 02/05/23 0504 02/05/23 0755   BP: 134/69 119/53  104/59   BP Location: Left arm Left arm  Left arm   Patient Position: Lying Lying  Lying   Pulse: 91 81  80   Resp: 18 18  16   Temp: 98.5 °F (36.9 °C) 98.1 °F (36.7 °C)  97.1 °F (36.2 °C)   TempSrc: Oral Oral  Oral   SpO2: 97% 99% 93% 97%   Weight:       Height:         Current Status 12/19/2022   ECOG score 0       Physical Exam    CONSTITUTIONAL:  Vital signs reviewed.  No distress, looks comfortable.  Elderly female.  EYES:  Conjunctiva and lids unremarkable.    EARS,NOSE,MOUTH,THROAT:  Ears and nose appear unremarkable.    RESPIRATORY:  Normal respiratory effort.  Lungs clear to auscultation bilaterally.  CARDIOVASCULAR:  Normal S1, S2.  No murmurs rubs or gallops.  No significant lower extremity edema.  GASTROINTESTINAL: Abdomen appears unremarkable.  Nondistended   LYMPHATIC:  No cervical, supraclavicular  lymphadenopathy.  SKIN:  Warm.  No rashes.  PSYCHIATRIC:  Normal judgment and insight.  Normal mood and affect.  NEURO: AAOx3, no obvious focal deficits.      RECENT LABS:  Hematology WBC   Date Value Ref Range Status   02/05/2023 49.85 (C) 3.40 - 10.80 10*3/mm3 Final     RBC   Date Value Ref Range Status   02/05/2023 2.22 (L) 3.77 - 5.28 10*6/mm3 Final     Hemoglobin   Date Value Ref Range Status   02/05/2023 6.8 (C) 12.0 - 15.9 g/dL Final     Hematocrit   Date Value Ref Range Status   02/05/2023 21.0 (L) 34.0 - 46.6 % Final     Platelets   Date Value Ref Range Status   02/05/2023 334 140 - 450 10*3/mm3 Final          Assessment & Plan   Ms. Boyer is a pleasant 82-year-old female with medical history significant for JAK2 positive myeloproliferative disorder, perhaps primary myelofibrosis, A-fib on chronic Eliquis, CAD with stents and on Plavix admitted with GI bleed.    #GI bleed, likely upper:  · Presented with abdominal pain and black stool.  Hemoglobin 6.1 on presentation.  · EGD performed during last admission on 1/25/2023 had noted mild mucosal changes characterized by slowing in the lower third of the esophagus.  Patchy mild inflammation characterized by congestion and erythema found in the gastric body.  The examined duodenum was normal.  Multiple biopsies taken.  · GI on board.  Thinks she may have oozed from the prior biopsy site.  · Anticoagulation with Eliquis and Plavix on hold.  On PPI    #Severe anemia, likely blood loss related in the setting of primary myelofibrosis:  · Hemoglobin 6.1 on admission.  Iron profile showed normal iron level of 63, ferritin of 115 and iron saturation of 12%.  · Hemoglobin did improve to 7.3 after transfusion, however dropped again to 6.8.  Being transfused 1 unit PRBC.  · Continue transfusion to keep hemoglobin > 7.   · Will hold jakafi as it can be associated with anemia and hemorrhage.  · Will administer IV iron as well.    #JAK2 positive myeloproliferative disorder,  likely primary myelofibrosis vs CMML:  · Patient follows up with Dr. Reinoso in our practice.  · She is currently on Jakafi 15 mg BID as outpatient.  · Given some risk of hemorrhage associated with jakafi, will hold hold Jakafi while inpatient.    #Leukocytosis, neutrophilic predominant:   · WBC count 56.36 on admission on 2/4/2023  · This is likely myeloproliferative disease related.  His WBC count has ranged between 30- 50,000 since November 2022.  · Last bone marrow biopsy from October 2022 had shown hypercellular marrow at 95% with involvement by chronic myeloproliferative neoplasm, less than 5% blasts.  Mild reticulin fibrosis.  Decreased iron stores.  Consideration of CMML.    · Will monitor for now.  Consider hydroxyurea if worsening leukocytosis.    #A-fib: Eliquis on hold    #CAD s/p PCI: Plavix on hold.    Recommendations:  -Hold jakafi for now  -IV iron  -Will consider hydroxyurea if worsening leukocytosis  -Transfuse to keep hemoglobin > 7  -Rest per GI and primary  -We will continue to follow

## 2023-02-05 NOTE — PROGRESS NOTES
[facility]  Inpatient Progress Note    Reason for Follow Up: Abdominal pain, reports of melenic stools, anemia    Subjective     Interval History:   Hemoglobin is 6.8 this morning, she was transfused 1 unit of packed red cells yesterday.  She was discharged to hemoglobin of 7.5 1/26/2023.    No further bowel movements, no overt bleeding.  Episode of pain last night resolved now following pain medication.  Awaiting mesenteric duplex ultrasound.    Current Facility-Administered Medications:   •  acetaminophen (TYLENOL) tablet 650 mg, 650 mg, Oral, Q4H PRN, Jaimee Macias MD  •  atorvastatin (LIPITOR) tablet 80 mg, 80 mg, Oral, Nightly, Jaimee Macias MD, 80 mg at 02/04/23 2121  •  dextrose (D50W) (25 g/50 mL) IV injection 25 g, 25 g, Intravenous, Q15 Min PRN, Faustina Barcenas APRN  •  dextrose (GLUTOSE) oral gel 15 g, 15 g, Oral, Q15 Min PRN, Faustina Barcenas APRN  •  empagliflozin (JARDIANCE) tablet 10 mg, 10 mg, Oral, Daily, Jaimee Macias MD  •  furosemide (LASIX) tablet 40 mg, 40 mg, Oral, Daily, Jaimee Macias MD  •  glucagon (GLUCAGEN) injection 1 mg, 1 mg, Intramuscular, Q15 Min PRN, Faustina Barcenas APRN  •  insulin lispro (ADMELOG) injection 0-7 Units, 0-7 Units, Subcutaneous, TID AC, Faustina Barcenas APRN  •  latanoprost (XALATAN) 0.005 % ophthalmic solution 1 drop, 1 drop, Both Eyes, Nightly, Jaimee Macias MD, 1 drop at 02/04/23 2310  •  melatonin tablet 3 mg, 3 mg, Oral, Nightly PRN, Jaimee Macias MD  •  nystatin (MYCOSTATIN) 100,000 unit/mL suspension 500,000 Units, 5 mL, Oral, 4x Daily, Charles Diaz MD, 500,000 Units at 02/04/23 2121  •  ondansetron (ZOFRAN) tablet 4 mg, 4 mg, Oral, Q6H PRN **OR** ondansetron (ZOFRAN) injection 4 mg, 4 mg, Intravenous, Q6H PRN, Stingdio, Jaimee Mckeon MD  •  pantoprazole (PROTONIX) 40 mg in 100 mL NS (VTB), 8 mg/hr, Intravenous, Continuous, Jamel Emmanuel II, MD, Last Rate:  20 mL/hr at 02/05/23 0645, 8 mg/hr at 02/05/23 0645  •  ruxolitinib (JAKAFI) chemo tablet 15 mg, 15 mg, Oral, BID, Jaimee Macias MD, 15 mg at 02/04/23 2305  •  [COMPLETED] Insert Peripheral IV, , , Once **AND** sodium chloride 0.9 % flush 10 mL, 10 mL, Intravenous, PRN, Jamel Emmanuel II, MD  •  sodium chloride 0.9 % infusion, 150 mL/hr, Intravenous, Continuous, Jaimee Macias MD, Last Rate: 150 mL/hr at 02/05/23 0423, 150 mL/hr at 02/05/23 0423  Review of Systems:   All systems reviewed and negative except for: GI: Abdominal pain      Objective     Vital Signs  Temp:  [97.1 °F (36.2 °C)-99.2 °F (37.3 °C)] 97.1 °F (36.2 °C)  Heart Rate:  [80-97] 80  Resp:  [12-18] 16  BP: (104-147)/(48-76) 104/59  Body mass index is 27.78 kg/m².    Intake/Output Summary (Last 24 hours) at 2/5/2023 0839  Last data filed at 2/4/2023 2116  Gross per 24 hour   Intake 120 ml   Output --   Net 120 ml     No intake/output data recorded.     Physical Exam:   General: patient awake, alert and cooperative, appears stated age, elderly, hard of hearing   Eyes: Normal lids and lashes, no scleral icterus   Neck: supple, normal ROM   Skin: warm and dry, not jaundiced   Cardiovascular: regular rhythm and rate, no murmurs auscultated   Pulm: clear to auscultation bilaterally, regular and unlabored   Abdomen: soft, nontender, nondistended; normal bowel sounds   Rectal: deferred   Extremities: no rash or edema   Psychiatric: Normal mood and behavior; memory intact     Results Review:     I reviewed the patient's new clinical results.    Results from last 7 days   Lab Units 02/05/23  0806 02/05/23  0356 02/04/23  2335 02/04/23  1419   WBC 10*3/mm3  --  49.85*  --  56.36*   HEMOGLOBIN g/dL 6.8* 7.1* 7.3* 6.1*   HEMATOCRIT % 21.0* 21.5* 21.7* 18.4*   PLATELETS 10*3/mm3  --  334  --  385     Results from last 7 days   Lab Units 02/05/23  0356 02/04/23  1419   SODIUM mmol/L 136 136   POTASSIUM mmol/L 3.6 4.3   CHLORIDE mmol/L 104 100    CO2 mmol/L 24.1 26.0   BUN mg/dL 11 13   CREATININE mg/dL 0.71 0.76   CALCIUM mg/dL 8.2* 8.6   BILIRUBIN mg/dL 0.9 0.6   ALK PHOS U/L 171* 194*   ALT (SGPT) U/L 41* 52*   AST (SGOT) U/L 23 35*   GLUCOSE mg/dL 75 119*     Results from last 7 days   Lab Units 02/04/23  1419   INR  1.16*     Lab Results   Lab Value Date/Time    LIPASE 10 (L) 02/04/2023 1419    LIPASE 13 01/23/2023 0216    LIPASE 20 11/21/2022 0250    LIPASE 38 08/31/2022 2131    LIPASE 16.0 02/09/2022 0525    LIPASE 50 06/29/2021 1131       Radiology:  CT Abdomen Pelvis With Contrast   Final Result   1. No evidence for acute abnormality of the abdomen or pelvis.   2. Small hiatal hernia.   3. 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 Suspect small left   renal cyst.   4. Previous cholecystectomy.   5. Sigmoid diverticulosis without evidence for diverticulitis.       Radiation dose reduction techniques were utilized, including automated   exposure control and exposure modulation based on body size.       This report was finalized on 2/4/2023 4:13 PM by Dr. Torey Bedoya M.D.              Assessment & Plan     Patient Active Problem List   Diagnosis   • CAD (coronary artery disease)   • Nonbacterial thrombotic endocarditis   • Paroxysmal atrial fibrillation (HCC)   • Myeloproliferative disorder (HCC)   • Microcytic anemia   • Type 2 diabetes mellitus, without long-term current use of insulin (HCC)   • GERD (gastroesophageal reflux disease)   • Hypertension   • Personal history of transient ischemic attack (TIA), 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 deficits   • COVID   • Porcelain gallbladder   • Breast cancer (HCC)   • Chronic diastolic (congestive) heart failure (HCC)   • Cholecystitis   • Urinary tract infection without hematuria, site unspecified   • Epigastric abdominal pain   • Gastritis   • ABLA (acute blood loss anemia)       Impression  1.  Melena: With only minimal decline in her hemoglobin following discharge 1/26.  No further  episodes    2.  Acute on chronic anemia: Likely related to above plus myeloproliferative disorder.  She has normocytic indices    3.  Generalized abdominal pain    4.  A-fib on Eliquis    5.  CAD on Plavix    6.  Myeloproliferative disorder: With significantly elevated WBC    7.  Normal MRI biliary ducts: Minimal intrahepatic prominence    8.  Elevated liver enzymes: Predominantly alkaline phosphatase.  Normal bilirubin.  Nonobstructive pattern.    Plan  Agree with additional unit of packed red blood cells today  Follow-up results of mesenteric duplex ultrasound  She can go on a full liquid diet following her duplex ultrasound  Continue PPI drip  Continue to hold anticoagulation for the time being  Follow H&H and transfusion as per admitting  We will appreciate otology/oncology's opinion regarding her anemia  Consideration of EGD/push enteroscopy and colonoscopy when anticoagulation is cleared from her system and she is off Plavix for 5 days if there is significant concern that she is having GI bleeding rather than a complication from her proliferative disorder causing her anemia    I discussed the patients findings and my recommendations with patient, family and nursing staff.    All necessary PPE, including face mask and eye protection, were worn during this encounter.  Hand sanitization was performed both before and after the patient interaction.    Over 25 minutes was spent reviewing the patient's chart and records, in discussion with the patient, in examination of the patient, and in discussion with members of the patient's medical team.    Sarah Miles MD

## 2023-02-05 NOTE — CONSULTS
Patient Name: Catherine Boyer  :1940  82 y.o.    Date of Admission: 2023  Encounter Provider: Jj Navarro III, MD  Date of Encounter Visit: 23  Place of Service: [facility] CARDIOLOGY  Referring Provider: Jaimee Macias MD  Patient Care Team:  Kristi Moreau APRN as PCP - General (Nurse Practitioner)  Gerard Foreman MD as Referring Physician (Medical Oncology)  Darrell Reinoso MD as Consulting Physician (Hematology and Oncology)  Albin Barriga MD as Consulting Physician (Cardiology)  Richard Aldana, RN as       Chief complaint:      History of Present Illness: She is a patient of Dr. Barriga's with hypertension, paroxysmal atrial fibrillation, chronic diastolic CHF, and coronary artery disease.  Additional history includes diabetes, GERD, TIA, myeloproliferative disorder, nonbacterial endocarditis, and right-sided breast cancer status post lumpectomy and radiation.    She presented to the ED yesterday with recurrent abdominal pain and associated nausea, vomiting, and black stool.  We have been asked to see here because she is on Plavix.  Patient currently was sleeping on my arrival, awakens, denies any chest pain or chest discomfort and then went back to sleep.  Reportedly the black stools have been present since her discharge last month.  On arrival her hemoglobin was 6.1.  She was given a transfusion of packed red blood cells admitted for further evaluation and treatment.  GI was consulted and recommended holding both Eliquis and the clopidogrel.  Their plan is that when she is off clopidogrel for 5 days may consider colonoscopy and or EGD/push enteroscopy.  They order a mesenteric duplex ultrasound.    She has not had any complaints of anginal chest pain while she has been here.  No shortness of breath.  No complaints of palpitations or tachycardia, no presyncope or syncope.    In 2022, she presented with heartburn symptoms and chest pain.   She was markedly anemic with a hemoglobin of 5.5 and required PRBC infusion.  She was diagnosed with a non-STEMI.  She underwent stent placement to the diagonal.  She had a long lesion in the mid distal LAD that was treated medically.  EF was 45-50%.      In 8/2022, she presented with chest pain and shortness of breath. She was treated for acute on chronic diastolic heart failure with hypoxia.  Stress test was positive. Subsequently, she underwent a LEFTY to large diagonal branch for in-stent stenosis.  She has  of mid RCA and nonobstructive disease of the LAD.  There was no stent in LAD.  Her shortness of breath improved with IV diuresis and she was discharged on 2 L of oxygen nasal cannula.  her lisinopril was discontinued and she was started on  empagliflozin and discharged on her home dose of furosemide 40 mg daily.    In November 2022, she presented to the ED with abdominal pain.  Further evaluation demonstrated a UTI for which a cholecystectomy was recommended.  We were consulted for preoperative clearance given her recent PCI.  She was cleared to hold the Eliquis and clopidogrel but we recommended she take aspirin perioperatively.  Subsequently, she underwent a laparoscopic cholecystectomy with intraoperative cholangiogram.    She was admitted last month for epigastric pain.  She underwent an EGD demonstrating mucosal changes in the esophagus, gastric and HH.  MRI of the abdomen demonstrated no obstruction or stone but did reveal a small pancreatic cystic lesion felt to be a pseudocyst.  ID was consulted for acute UTI.  However, the urine was ultimately felt to be colonized with ESBL.  She was discharged on 1/26.        Past Medical History:   Diagnosis Date   • Atherosclerosis of abdominal aorta (HCC) 2/5/2023   • Atrial fibrillation (HCC)    • Breast cancer (HCC)    • CAD (coronary artery disease)     NSTEMI 2/2022: 90% ostial LAD, 99% D1, 70% mid-distal LAD (medical therapy). She received two stents  (2.5x18 and 2.5x26mm [place] LEFTY) but I don't know which one went to which lesion.   • Carotid atherosclerosis    • Chronic diastolic (congestive) heart failure (HCC)    • GERD (gastroesophageal reflux disease)    • Glaucoma    • History of cataract    • Hypertension    • Microcytic anemia     per Dr. oleg pate office  note 6/30/22-dd   • Myeloproliferative disorder (HCC)     JAK2 positive   • Nonbacterial thrombotic endocarditis     6/2021: 4x5mm vegetation on the ventricular surface of the anterior MV, negative blood cultures   • Porcelain gallbladder    • PUD (peptic ulcer disease)    • TIA (transient ischemic attack)    • Type 2 diabetes mellitus (HCC)    • Type 2 diabetes mellitus with circulatory disorder, without long-term current use of insulin (HCC) 7/14/2022   • Upper GI bleed        Past Surgical History:   Procedure Laterality Date   • BREAST LUMPECTOMY  1999   • CARDIAC CATHETERIZATION N/A 09/02/2022    Procedure: Coronary angiography;  Surgeon: Guero Verde MD;  Location: [facility] CATH INVASIVE LOCATION;  Service: Cardiovascular;  Laterality: N/A;   • CARDIAC CATHETERIZATION N/A 09/02/2022    Procedure: Stent LEFTY coronary;  Surgeon: Guero Verde MD;  Location: [facility] CATH INVASIVE LOCATION;  Service: Cardiovascular;  Laterality: N/A;   • CATARACT EXTRACTION  2011   • CHOLECYSTECTOMY WITH INTRAOPERATIVE CHOLANGIOGRAM N/A 11/28/2022    Procedure: CHOLECYSTECTOMY LAPAROSCOPIC INTRAOPERATIVE CHOLANGIOGRAM;  Surgeon: Dani Grover Jr., MD;  Location: [facility] OR;  Service: General;  Laterality: N/A;   • ENDOSCOPY N/A 1/25/2023    Procedure: ESOPHAGOGASTRODUODENOSCOPY WITH BIOPSIES;  Surgeon: Charles Diaz MD;  Location: [facility] ENDOSCOPY;  Service: Gastroenterology;  Laterality: N/A;  pre: abd pain, nausea  post: hiatal hernia, mild gastritis, sloughing of the esophagus   • JOINT REPLACEMENT           Prior to Admission medications    Medication Sig Start Date End Date Taking? Authorizing  Provider   apixaban (Eliquis) 5 MG tablet tablet Take 1 tablet by mouth Every 12 (Twelve) Hours. 11/18/22  Yes Estrellita Blanco APRN   atorvastatin (LIPITOR) 80 MG tablet Take 1 tablet by mouth Every Night. 12/23/22  Yes Estrellita Blanco APRN   bimatoprost (LUMIGAN) 0.01 % ophthalmic drops Apply 1 drop to eye(s) as directed by provider.   Yes Marya Olivas MD   clopidogrel (PLAVIX) 75 MG tablet Take 1 tablet by mouth Daily. 12/23/22  Yes Estrellita Blanco APRN   empagliflozin (JARDIANCE) 10 MG tablet tablet Take 1 tablet by mouth Daily. 9/12/22  Yes Estrellita Blanco APRN   furosemide (LASIX) 40 MG tablet Take 1 tablet by mouth Daily. 1/27/23  Yes Ysabel Llamas APRN   Jakafi 15 MG chemo tablet Take 1 tablet by mouth 2 (Two) Times a Day. 9/28/22  Yes Darrell Reinoso MD   latanoprost (XALATAN) 0.005 % ophthalmic solution Administer 1 drop to both eyes. 3/14/22  Yes Marya Olivas MD   metoprolol succinate XL (TOPROL-XL) 25 MG 24 hr tablet 25 mg 2 (Two) Times a Day. 3/9/22  Yes Marya Olivas MD   nystatin (MYCOSTATIN) 100,000 unit/mL suspension Swish and swallow 5 mL 4 (Four) Times a Day for 14 days. 2/3/23 2/17/23 Yes Charles Diaz MD   ondansetron (ZOFRAN) 4 MG tablet Take 1 tablet by mouth Every 6 (Six) Hours As Needed for Nausea or Vomiting. 11/22/22  Yes Ila Su APRN   pantoprazole (PROTONIX) 40 MG EC tablet Take 40 mg by mouth Daily. 4/14/22  Yes Marya Olivas MD   sertraline (ZOLOFT) 50 MG tablet Take 50 mg by mouth Daily. 4/5/22  Yes Marya Olivas MD   acetaminophen (TYLENOL) 500 MG tablet Take 500 mg by mouth As Needed.    Provider, MD Marya       Allergies   Allergen Reactions   • Aspirin Unknown - Low Severity   • Oxycodone Unknown - Low Severity   • Diphenhydramine Hcl Anxiety and Unknown (See Comments)     Benadryl IVP       Social History     Socioeconomic History   • Marital status:    Tobacco Use   • Smoking status: Former      "Packs/day: 1.00     Years: 20.00     Pack years: 20.00     Types: Cigarettes   • Smokeless tobacco: Never   • Tobacco comments:     30  years ago   Vaping Use   • Vaping Use: Never used   Substance and Sexual Activity   • Alcohol use: Yes     Comment: \"rare\"   • Drug use: Never   • Sexual activity: Defer       Family History   Problem Relation Age of Onset   • Ovarian cancer Mother    • Lung cancer Father    • Lung cancer Sister    • Malig Hyperthermia Neg Hx        REVIEW OF SYSTEMS:   All other systems reviewed and negative.        Objective:     Vitals:    02/05/23 0504 02/05/23 1026 02/05/23 1030 02/05/23 1100   BP:  126/63 126/63 116/62   BP Location:  Left arm     Patient Position:  Lying     Pulse:  92 92 86   Resp:  16 16 16   Temp:  98.6 °F (37 °C) 98.6 °F (37 °C) 98.6 °F (37 °C)   TempSrc:  Oral Oral Oral   SpO2: 93% 97% 97% 95%   Weight:       Height:         Body mass index is 27.78 kg/m².    Intake/Output Summary (Last 24 hours) at 2/5/2023 1122  Last data filed at 2/4/2023 2116  Gross per 24 hour   Intake 120 ml   Output --   Net 120 ml     General Appearance:    comfortable, in no acute distress   Head:    Normocephalic, without obvious abnormality, atraumatic   Eyes:            Lids and lashes normal, conjunctivae and sclerae normal, no   icterus, no pallor, corneas clear, PERRLA   Ears:    Ears appear intact with no abnormalities noted   Throat:   No oral lesions, no thrush, oral mucosa moist   Neck:   No adenopathy, supple, trachea midline, no thyromegaly, no   carotid bruit, no JVD   Back:     No kyphosis present, no scoliosis present, no skin lesions, erythema or scars, no tenderness to percussion or palpation, range of motion normal   Lungs:     Clear to auscultation,respirations regular, even and unlabored    Heart:    Regular rhythm and normal rate, normal S1 and S2, no murmur, no gallop, no rub, no click   Chest Wall:    No abnormalities observed   Abdomen:     Normal bowel sounds, no masses, " no organomegaly, soft        non-tender, non-distended, no guarding, no rebound  tenderness   Extremities:   Moves all extremities well, no edema, no cyanosis, no redness   Pulses:   Pulses palpable and equal bilaterally. Normal radial, carotid, femoral, dorsalis pedis and posterior tibial pulses bilaterally. Normal abdominal aorta   Skin:  Psychiatric:   No bleeding, bruising or rash    Alert and oriented x 3, normal mood and affect           Lab Review:     Results from last 7 days   Lab Units 02/05/23  0356   SODIUM mmol/L 136   POTASSIUM mmol/L 3.6   CHLORIDE mmol/L 104   CO2 mmol/L 24.1   BUN mg/dL 11   CREATININE mg/dL 0.71   CALCIUM mg/dL 8.2*   BILIRUBIN mg/dL 0.9   ALK PHOS U/L 171*   ALT (SGPT) U/L 41*   AST (SGOT) U/L 23   GLUCOSE mg/dL 75         Results from last 7 days   Lab Units 02/05/23  0806 02/05/23  0356   WBC 10*3/mm3  --  49.85*   HEMOGLOBIN g/dL 6.8* 7.1*   HEMATOCRIT % 21.0* 21.5*   PLATELETS 10*3/mm3  --  334     Results from last 7 days   Lab Units 02/04/23  1419   INR  1.16*   APTT seconds 41.2*     Results from last 7 days   Lab Units 02/05/23  0356   MAGNESIUM mg/dL 2.0     Results for orders placed during the hospital encounter of 08/31/22    Echocardiogram 2D complete    Interpretation Summary  · Left ventricular ejection fraction appears to be 61 - 65%. Left ventricular systolic function is normal.[place] is hypokinetic.  · Left ventricular diastolic function is consistent with (grade II w/high LAP) pseudonormalization.  · There is echogenic mobile mass on the ventricular surface of a calcified and thickened non-coronary cusp of the aortic valve. An echo from 09/30/2021 reported similar finding.DDX old vegetation, mass of calcium extending from the noncoronary cusp of the aortic valve.  · The left atrial cavity is moderate to severely dilated.          I personally viewed and interpreted the patient's EKG/Telemetry data.      Assessment and Plan:       Active Hospital Problems     Diagnosis  POA   • **ABLA (acute blood loss anemia) [D62]  Yes   • Atherosclerosis of abdominal aorta (HCC) [I70.0]  Yes   • Chronic diastolic (congestive) heart failure (HCC) [I50.32]  Yes   • Personal history of transient ischemic attack (TIA), 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 deficits [Z86.73]  Not Applicable   • CAD (coronary artery disease) [I25.10]  Yes   • Myeloproliferative disorder (HCC) [D47.1]  Yes   • Paroxysmal atrial fibrillation (HCC) [I48.0]  Yes   • Type 2 diabetes mellitus with circulatory disorder, 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     1.  Coronary Artery Disease: Status post stent placement to the diagonal in February 2022,  stent placement to the diagonal for restenosis 9/2/2022.    Has been on chronic Plavix therapy, held with GI bleed.  She has a class I indication for continued therapy with antiplatelet therapy if GI status will allow.  Patient is at increased risk for restenosis while off antiplatelet therapy.     2.    Acute blood loss anemia, GI following, Eliquis and clopidogrel held at this point     3.  Nonbacterial thrombotic endocarditis: Diagnosed in June 2021.    Stable     4.  Paroxysmal Atrial Fibrillation: Currently in a normal sinus rhythm.    Patient has a [id number] score of 9, which puts her in a high risk category with a yearly risk of CVA of 15.2% if off anticoagulation.  Eliquis currently held, should be resumed once appropriate and if possible from a GI standpoint.    5.  Hypertension: Blood pressure stable.     6.  Chronic HFpEF, no evidence of volume overload    Jj Navarro III, MD  02/05/23  11:22 EST

## 2023-02-05 NOTE — PAYOR COMM NOTE
"Maribell Boyer (82 y.o. Female)     PLEASE SEE ATTACHED FOR INPT AUTH         PLEASE CALL SARA SAMUELS RN/ DEPT @ [phone]  OR FAX  DEPARTMENT @  [phone]    THANK YOU   SARA SAMUELS RN  [facility]         Date of Birth   1940    Social Security Number       Address   [address] [place] [address] [address] [place] [address]    Home Phone   [phone]    MRN   [id number]       Pentecostal   Unknown    Marital Status                               Admission Date   2/4/23    Admission Type   Emergency    Admitting Provider   Jaimee Macias MD    Attending Provider   Orlando Sewell MD    Department, Room/Bed   [facility], S419/1       Discharge Date       Discharge Disposition       Discharge Destination                               Attending Provider: Orlando Sewell MD    Allergies: Aspirin, Oxycodone, Diphenhydramine Hcl    Isolation: None   Infection: ESBL E coli (01/25/23)   Code Status: CPR    Ht: 154.9 cm (61\")   Wt: 66.7 kg (147 lb)    Admission Cmt: None   Principal Problem: ABLA (acute blood loss anemia) [D62]                 Active Insurance as of 2/4/2023     Primary Coverage     Payor Plan Insurance Group Employer/Plan Group    AETNA MEDICARE REPLACEMENT AETNA MEDICARE REPLACEMENT [id number]     Payor Plan Address Payor Plan Phone Number Payor Plan Fax Number Effective Dates    [address] [phone]  1/1/2022 - None Entered    [facility] [id number]       Subscriber Name Subscriber Birth Date Member ID       MARIBELL BOYER [id number] 1940 [id number]                 Emergency Contacts      (Rel.) Home Phone Work Phone Mobile Phone    MERLINRAY (Daughter) [phone] -- [phone]    QuoccynthiaKaren (Daughter) -- -- [phone]    MerlinGreg (Son) -- -- [phone]    Meg Boyer (Brother) -- -- [phone]    MEG BANKS (Brother) -- -- [phone]            [place]: NPI " [id number]  Tax ID [id number]     History & Physical      Stingl, Jaimee Mckeon MD at 23          HISTORY AND PHYSICAL   [facility]        Date of Admission: 2023  Patient Identification:  Name: Catherine Boyer  Age: 82 y.o.  Sex: female  :  1940  MRN: [id number]                     Primary Care Physician: Kristi Moreau APRN    Chief Complaint:  82 year old female who presented to the emergency room with abdominal pain and black stool which have been present for several days; she was recently admitted and had an egd done; she has had nausea but presently denies pain or nausea; no fever or chills     History of Present Illness:   As above    Past Medical History:  Past Medical History:   Diagnosis Date   • Atrial fibrillation (HCC)    • Breast cancer (HCC)    • CAD (coronary artery disease)     NSTEMI 2022: 90% ostial LAD, 99% D1, 70% mid-distal LAD (medical therapy). She received two stents (2.5x18 and 2.5x26mm Finesse LEFTY) but I don't know which one went to which lesion.   • Carotid atherosclerosis    • Chronic diastolic (congestive) heart failure (HCC)    • GERD (gastroesophageal reflux disease)    • Glaucoma    • History of cataract    • Hypertension    • Microcytic anemia     per Dr. oleg pate office  note 22-dd   • Myeloproliferative disorder (HCC)     JAK2 positive   • Nonbacterial thrombotic endocarditis     2021: 4x5mm vegetation on the ventricular surface of the anterior MV, negative blood cultures   • Porcelain gallbladder    • PUD (peptic ulcer disease)    • TIA (transient ischemic attack)    • Type 2 diabetes mellitus (HCC)    • Upper GI bleed      Past Surgical History:  Past Surgical History:   Procedure Laterality Date   • BREAST LUMPECTOMY     • CARDIAC CATHETERIZATION N/A 2022    Procedure: Coronary angiography;  Surgeon: Guero Verde MD;  Location: [facility] INVASIVE LOCATION;  Service: Cardiovascular;  Laterality: N/A;   • CARDIAC  CATHETERIZATION N/A 09/02/2022    Procedure: Stent LEFTY coronary;  Surgeon: Guero Verde MD;  Location: [facility] CATH INVASIVE LOCATION;  Service: Cardiovascular;  Laterality: N/A;   • CATARACT EXTRACTION  2011   • CHOLECYSTECTOMY WITH INTRAOPERATIVE CHOLANGIOGRAM N/A 11/28/2022    Procedure: CHOLECYSTECTOMY LAPAROSCOPIC INTRAOPERATIVE CHOLANGIOGRAM;  Surgeon: Dani Grover Jr., MD;  Location: [facility] MAIN OR;  Service: General;  Laterality: N/A;   • ENDOSCOPY N/A 1/25/2023    Procedure: ESOPHAGOGASTRODUODENOSCOPY WITH BIOPSIES;  Surgeon: Charles Diaz MD;  Location: [facility] ENDOSCOPY;  Service: Gastroenterology;  Laterality: N/A;  pre: abd pain, nausea  post: hiatal hernia, mild gastritis, sloughing of the esophagus   • JOINT REPLACEMENT        Home Meds:  Medications Prior to Admission   Medication Sig Dispense Refill Last Dose   • apixaban (Eliquis) 5 MG tablet tablet Take 1 tablet by mouth Every 12 (Twelve) Hours. 180 tablet 3 2/3/2023   • atorvastatin (LIPITOR) 80 MG tablet Take 1 tablet by mouth Every Night. 90 tablet 0 2/3/2023   • bimatoprost (LUMIGAN) 0.01 % ophthalmic drops Apply 1 drop to eye(s) as directed by provider.   2/4/2023   • clopidogrel (PLAVIX) 75 MG tablet Take 1 tablet by mouth Daily. 30 tablet 0 2/4/2023   • empagliflozin (JARDIANCE) 10 MG tablet tablet Take 1 tablet by mouth Daily. 90 tablet 3 2/4/2023   • furosemide (LASIX) 40 MG tablet Take 1 tablet by mouth Daily. 90 tablet 3 2/4/2023   • Jakafi 15 MG chemo tablet Take 1 tablet by mouth 2 (Two) Times a Day. 60 tablet 3 2/4/2023   • latanoprost (XALATAN) 0.005 % ophthalmic solution Administer 1 drop to both eyes.   2/4/2023   • metoprolol succinate XL (TOPROL-XL) 25 MG 24 hr tablet 25 mg 2 (Two) Times a Day.   2/4/2023   • nystatin (MYCOSTATIN) 100,000 unit/mL suspension Swish and swallow 5 mL 4 (Four) Times a Day for 14 days. 60 mL 0 2/4/2023   • ondansetron (ZOFRAN) 4 MG tablet Take 1 tablet by mouth Every 6 (Six) Hours As Needed  "for Nausea or Vomiting. 30 tablet 0    • pantoprazole (PROTONIX) 40 MG EC tablet Take 40 mg by mouth Daily.   2/4/2023   • sertraline (ZOLOFT) 50 MG tablet Take 50 mg by mouth Daily.   2/4/2023   • acetaminophen (TYLENOL) 500 MG tablet Take 500 mg by mouth As Needed.   More than a month       Allergies:  Allergies   Allergen Reactions   • Aspirin Unknown - Low Severity   • Oxycodone Unknown - Low Severity   • Diphenhydramine Hcl Anxiety and Unknown (See Comments)     Benadryl IVP     Immunizations:  Immunization History   Administered Date(s) Administered   • Pneumococcal Conjugate 13-Valent (PCV13) 07/08/2021     Social History:   Social History     Social History Narrative   • Not on file     Social History     Socioeconomic History   • Marital status:    Tobacco Use   • Smoking status: Former     Packs/day: 1.00     Years: 20.00     Pack years: 20.00     Types: Cigarettes   • Smokeless tobacco: Never   • Tobacco comments:     30  years ago   Vaping Use   • Vaping Use: Never used   Substance and Sexual Activity   • Alcohol use: Yes     Comment: \"rare\"   • Drug use: Never   • Sexual activity: Defer       Family History:  Family History   Problem Relation Age of Onset   • Ovarian cancer Mother    • Lung cancer Father    • Lung cancer Sister    • Malig Hyperthermia Neg Hx         Review of Systems  See history of present illness and past medical history.  Patient denies headache, dizziness, syncope, falls, trauma, change in vision, change in hearing, change in taste, changes in weight, changes in appetite, focal weakness, numbness, or paresthesia.  Patient denies chest pain, palpitations, dyspnea, orthopnea, PND, cough, sinus pressure, rhinorrhea, epistaxis, hemoptysis, nausea, vomiting,hematemesis, diarrhea, constipation or hematchezia.  Denies cold or heat intolerance, polydipsia, polyuria, polyphagia. Denies hematuria, pyuria, dysuria, hesitancy, frequency or urgency. Denies consumption of raw and under " "cooked meats foods or change in water source.  Denies fever, chills, sweats, night sweats.  Denies missing any routine medications. Remainder of ROS is negative.    Objective:  T Max 24 hrs: Temp (24hrs), Av.7 °F (37.1 °C), Min:98.3 °F (36.8 °C), Max:99.2 °F (37.3 °C)    Vitals Ranges:   Temp:  [98.3 °F (36.8 °C)-99.2 °F (37.3 °C)] 98.6 °F (37 °C)  Heart Rate:  [84-97] 84  Resp:  [12-18] 18  BP: (107-147)/(48-76) 114/65      Exam:  /65 (BP Location: Right arm, Patient Position: Lying)   Pulse 84   Temp 98.6 °F (37 °C) (Oral)   Resp 18   Ht 154.9 cm (61\")   Wt 66.7 kg (147 lb)   SpO2 98%   BMI 27.78 kg/m²     General Appearance:    Alert, cooperative, no distress, appears stated age   Head:    Normocephalic, without obvious abnormality, atraumatic   Eyes:    PERRL, conjunctivae/corneas clear, EOM's intact, both eyes   Ears:    Normal external ear canals, both ears   Nose:   Nares normal, septum midline, mucosa normal, no drainage    or sinus tenderness   Throat:   Lips, mucosa, and tongue normal   Neck:   Supple, symmetrical, trachea midline, no adenopathy;     thyroid:  no enlargement/tenderness/nodules; no carotid    bruit or JVD   Back:     Symmetric, no curvature, ROM normal, no CVA tenderness   Lungs:     Clear to auscultation bilaterally, respirations unlabored   Chest Wall:    No tenderness or deformity    Heart:    Regular rate and rhythm, S1 and S2 normal, no murmur, rub   or gallop   Abdomen:     Soft, nontender, bowel sounds active all four quadrants,     no masses, no hepatomegaly, no splenomegaly   Extremities:   Extremities normal, atraumatic, no cyanosis or edema   Pulses:   2+ and symmetric all extremities   Skin:   Skin color, texture, turgor normal, no rashes or lesions               .    Data Review:  Labs in chart were reviewed.  WBC   Date Value Ref Range Status   2023 56.36 (C) 3.40 - 10.80 10*3/mm3 Final     Hemoglobin   Date Value Ref Range Status   2023 6.1 (C) " 12.0 - 15.9 g/dL Final     Hematocrit   Date Value Ref Range Status   02/04/2023 18.4 (C) 34.0 - 46.6 % Final     Platelets   Date Value Ref Range Status   02/04/2023 385 140 - 450 10*3/mm3 Final     Sodium   Date Value Ref Range Status   02/04/2023 136 136 - 145 mmol/L Final     Potassium   Date Value Ref Range Status   02/04/2023 4.3 3.5 - 5.2 mmol/L Final     Comment:     Slight hemolysis detected by analyzer. Results may be affected.     Chloride   Date Value Ref Range Status   02/04/2023 100 98 - 107 mmol/L Final     CO2   Date Value Ref Range Status   02/04/2023 26.0 22.0 - 29.0 mmol/L Final     BUN   Date Value Ref Range Status   02/04/2023 13 8 - 23 mg/dL Final     Creatinine   Date Value Ref Range Status   02/04/2023 0.76 0.57 - 1.00 mg/dL Final     Glucose   Date Value Ref Range Status   02/04/2023 119 (H) 65 - 99 mg/dL Final     Calcium   Date Value Ref Range Status   02/04/2023 8.6 8.6 - 10.5 mg/dL Final                Imaging Results (All)     Procedure Component Value Units Date/Time    CT Abdomen Pelvis With Contrast [[id number]] Collected: 02/04/23 1607     Updated: 02/04/23 1617    Narrative:      CT ABDOMEN AND PELVIS WITH IV CONTRAST     HISTORY: Abdominal pain. Black stool.     TECHNIQUE:  CT includes axial imaging from the lung bases to the  trochanters with intravenous contrast and without use of oral contrast.  Data reconstructed in coronal and sagittal planes. Radiation dose  reduction techniques were utilized, including automated exposure control  and exposure modulation based on body size.     COMPARISON: MRI abdomen 01/25/2023, CT abdomen and pelvis with IV  contrast 01/23/2023, CT abdomen and pelvis with IV contrast 10/29/2022.     FINDINGS: Heart size is enlarged. Lung bases appear clear. Liver,  spleen, and adrenal glands appear within normal limits. 1.2 cm right  upper pole renal low-density lesion that contains fat density is  consistent with angiomyolipoma and this is without change.  There is a  posterior left renal low-density lesion measuring 9 mm and this is most  likely a cyst. Pancreas appears within normal limits. There is no bowel  dilatation or evidence for bowel obstruction. Sigmoid diverticulosis is  present without evidence for diverticulitis. There is no evidence for  intra-abdominal abscess formation. There is advanced multilevel  degenerative disc disease within the lower thoracic spine and lumbar  spine associated with vacuum phenomena.       Impression:      1. No evidence for acute abnormality of the abdomen or pelvis.  2. Small hiatal hernia.  3. 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 Suspect small left  renal cyst.  4. Previous cholecystectomy.  5. Sigmoid diverticulosis without evidence for diverticulitis.     Radiation dose reduction techniques were utilized, including automated  exposure control and exposure modulation based on body size.     This report was finalized on 2/4/2023 4:13 PM by Dr. Torey Bedoya M.D.               Assessment:  Active Hospital Problems    Diagnosis  POA   • **ABLA (acute blood loss anemia) [D62]  Yes      Resolved Hospital Problems   No resolved problems to display.   epigastric pain  A fib  Cad  Gi bleed  Hypertension  chronic diastolic chf  Atrial fibrillation  hyperglycemia    Plan:  Transfuse  ppi  Trend hgb  Appreciate gi input  Ask hematology to see her  Ask cardiology to see her regarding need for plavix  Monitor on telemetry  dw patient and ED provider    Jaimee Macias MD  2/4/2023  20:28 EST      Electronically signed by Jaimee Macias MD at 02/04/23 2032          Emergency Department Notes      Janna Yarbrough RN at 02/04/23 1344        Pt presents via EMS from [facility] with cc Abdominal Pain/N/V and was dx with gastroenteritis, also complains of dark stools and she is on a blood thinner. Pt is ambulatory at baseline, GCS 15. FSBS 172.     Electronically signed by Janna Yarbrough RN at  02/04/23 1345     Jamel Emmanuel II, MD at 02/04/23 1613      Procedure Orders    1. Critical Care [[id number]] ordered by Jamel Emmanuel II, MD                EMERGENCY DEPARTMENT ENCOUNTER    Room Number:  35/35  Date seen:  2/4/2023  PCP: Kristi Moreau APRN      HPI:  Chief Complaint: Abdominal pain  A complete HPI/ROS/PMH/PSH/SH/FH are unobtainable due to: None  Context: Catherine Boyer is a 82 y.o. female who presents to the ED c/o abdominal pain.  Is been ongoing for 3 days.  It is constant.  She reports having associated nausea, vomiting and black stool.        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   • Paroxysmal atrial fibrillation (HCC) 07/14/2022   • Myeloproliferative disorder (HCC) 07/14/2022   • Microcytic anemia 07/14/2022   • Type 2 diabetes mellitus, without long-term current use of insulin (HCC) 07/14/2022   • GERD (gastroesophageal reflux disease)    • Hypertension    • Personal history of transient ischemic attack (TIA), 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 deficits 09/04/2022   • COVID 10/29/2022   • Porcelain gallbladder    • Breast cancer (HCC)    • Chronic diastolic (congestive) heart failure (HCC)    • Cholecystitis 11/22/2022   • Urinary tract infection without hematuria, site unspecified 01/23/2023   • Epigastric abdominal pain 01/24/2023   • Gastritis 01/26/2023     Resolved Ambulatory Problems     Diagnosis Date Noted   • Acute on chronic diastolic heart failure (HCC) 07/14/2022   • Chest pain with high risk for cardiac etiology 08/31/2022   • Acute UTI (urinary tract infection) 11/21/2022     Past Medical History:   Diagnosis Date   • Atrial fibrillation (HCC)    • Carotid atherosclerosis    • Glaucoma    • History of cataract    • PUD (peptic ulcer disease)    • TIA (transient ischemic attack)    • Type 2 diabetes mellitus (HCC)    • Upper  "GI bleed          PAST SURGICAL HISTORY  Past Surgical History:   Procedure Laterality Date   • BREAST LUMPECTOMY  1999   • CARDIAC CATHETERIZATION N/A 09/02/2022    Procedure: Coronary angiography;  Surgeon: Guero Verde MD;  Location:  [facility] CATH INVASIVE LOCATION;  Service: Cardiovascular;  Laterality: N/A;   • CARDIAC CATHETERIZATION N/A 09/02/2022    Procedure: Stent LEFTY coronary;  Surgeon: Guero Verde MD;  Location:  [facility] CATH INVASIVE LOCATION;  Service: Cardiovascular;  Laterality: N/A;   • CATARACT EXTRACTION  2011   • CHOLECYSTECTOMY WITH INTRAOPERATIVE CHOLANGIOGRAM N/A 11/28/2022    Procedure: CHOLECYSTECTOMY LAPAROSCOPIC INTRAOPERATIVE CHOLANGIOGRAM;  Surgeon: Dani Grover Jr., MD;  Location: [facility] MAIN OR;  Service: General;  Laterality: N/A;   • ENDOSCOPY N/A 1/25/2023    Procedure: ESOPHAGOGASTRODUODENOSCOPY WITH BIOPSIES;  Surgeon: Charles Diaz MD;  Location: [facility] ENDOSCOPY;  Service: Gastroenterology;  Laterality: N/A;  pre: abd pain, nausea  post: hiatal hernia, mild gastritis, sloughing of the esophagus   • JOINT REPLACEMENT           FAMILY HISTORY  Family History   Problem Relation Age of Onset   • Ovarian cancer Mother    • Lung cancer Father    • Lung cancer Sister    • Malig Hyperthermia Neg Hx          SOCIAL HISTORY  Social History     Socioeconomic History   • Marital status:    Tobacco Use   • Smoking status: Former     Packs/day: 1.00     Years: 20.00     Pack years: 20.00     Types: Cigarettes   • Smokeless tobacco: Never   • Tobacco comments:     30  years ago   Vaping Use   • Vaping Use: Never used   Substance and Sexual Activity   • Alcohol use: Yes     Comment: \"rare\"   • Drug use: Never   • Sexual activity: Defer         ALLERGIES  Aspirin, Oxycodone, and Diphenhydramine hcl        REVIEW OF SYSTEMS  Review of Systems     All systems reviewed and negative except for those discussed in HPI.       PHYSICAL EXAM  ED Triage Vitals [02/04/23 1346]   Temp " Heart Rate Resp BP SpO2   99.2 °F (37.3 °C) 84 17 107/48 94 %      Temp src Heart Rate Source Patient Position BP Location FiO2 (%)   Oral Monitor -- -- --       Physical Exam      GENERAL: no acute distress  HENT: nares patent  EYES: no scleral icterus  CV: regular rhythm, normal rate  RESPIRATORY: normal effort  ABDOMEN: soft, generalized abdominal tenderness  MUSCULOSKELETAL: no deformity  NEURO: alert, moves all extremities, follows commands  PSYCH:  calm, cooperative  SKIN: warm, dry    Vital signs and nursing notes reviewed.          LAB RESULTS  Recent Results (from the past 24 hour(s))   Comprehensive Metabolic Panel    Collection Time: 02/04/23  2:19 PM    Specimen: Blood   Result Value Ref Range    Glucose 119 (H) 65 - 99 mg/dL    BUN 13 8 - 23 mg/dL    Creatinine 0.76 0.57 - 1.00 mg/dL    Sodium 136 136 - 145 mmol/L    Potassium 4.3 3.5 - 5.2 mmol/L    Chloride 100 98 - 107 mmol/L    CO2 26.0 22.0 - 29.0 mmol/L    Calcium 8.6 8.6 - 10.5 mg/dL    Total Protein 6.3 6.0 - 8.5 g/dL    Albumin 4.1 3.5 - 5.2 g/dL    ALT (SGPT) 52 (H) 1 - 33 U/L    AST (SGOT) 35 (H) 1 - 32 U/L    Alkaline Phosphatase 194 (H) 39 - 117 U/L    Total Bilirubin 0.6 0.0 - 1.2 mg/dL    Globulin 2.2 gm/dL    A/G Ratio 1.9 g/dL    BUN/Creatinine Ratio 17.1 7.0 - 25.0    Anion Gap 10.0 5.0 - 15.0 mmol/L    eGFR 77.4 >60.0 mL/min/1.73   Lipase    Collection Time: 02/04/23  2:19 PM    Specimen: Blood   Result Value Ref Range    Lipase 10 (L) 13 - 60 U/L   Type & Screen    Collection Time: 02/04/23  2:19 PM    Specimen: Blood   Result Value Ref Range    ABO Type O     RH type Positive     Antibody Screen Negative     T&S Expiration Date 2/7/2023 11:59:59 PM    Protime-INR    Collection Time: 02/04/23  2:19 PM    Specimen: Blood   Result Value Ref Range    Protime 15.0 (H) 11.7 - 14.2 Seconds    INR 1.16 (H) 0.90 - 1.10   aPTT    Collection Time: 02/04/23  2:19 PM    Specimen: Blood   Result Value Ref Range    PTT 41.2 (H) 22.7 - 35.4 seconds    CBC Auto Differential    Collection Time: 02/04/23  2:19 PM    Specimen: Blood   Result Value Ref Range    WBC 56.36 (C) 3.40 - 10.80 10*3/mm3    RBC 1.90 (L) 3.77 - 5.28 10*6/mm3    Hemoglobin 6.1 (C) 12.0 - 15.9 g/dL    Hematocrit 18.4 (C) 34.0 - 46.6 %    MCV 96.8 79.0 - 97.0 fL    MCH 32.1 26.6 - 33.0 pg    MCHC 33.2 31.5 - 35.7 g/dL    RDW 15.1 12.3 - 15.4 %    RDW-SD 51.7 37.0 - 54.0 fl    MPV 11.2 6.0 - 12.0 fL    Platelets 385 140 - 450 10*3/mm3   Manual Differential    Collection Time: 02/04/23  2:19 PM    Specimen: Blood   Result Value Ref Range    Neutrophil % 82.0 (H) 42.7 - 76.0 %    Lymphocyte % 2.0 (L) 19.6 - 45.3 %    Monocyte % 11.0 5.0 - 12.0 %    Metamyelocyte % 3.0 (H) 0.0 - 0.0 %    Myelocyte % 2.0 (H) 0.0 - 0.0 %    Neutrophils Absolute 46.22 (H) 1.70 - 7.00 10*3/mm3    Lymphocytes Absolute 1.13 0.70 - 3.10 10*3/mm3    Monocytes Absolute 6.20 (H) 0.10 - 0.90 10*3/mm3    Anisocytosis Mod/2+ None Seen    Dacrocytes Slight/1+ None Seen    Microcytes Slight/1+ None Seen    Stomatocytes Slight/1+ None Seen    WBC Morphology Normal Normal    Platelet Morphology Normal Normal   Prepare RBC, 1 Units    Collection Time: 02/04/23  4:07 PM   Result Value Ref Range    Product Code T1893V65     Unit Number [id number]     UNIT  ABO O     UNIT  RH POS     Crossmatch Interpretation Compatible     Dispense Status IS     Blood Expiration Date [id number]     Blood Type Barcode 5100        Ordered the above labs and reviewed the results.        RADIOLOGY  CT Abdomen Pelvis With Contrast    Result Date: 2/4/2023  CT ABDOMEN AND PELVIS WITH IV CONTRAST  HISTORY: Abdominal pain. Black stool.  TECHNIQUE:  CT includes axial imaging from the lung bases to the trochanters with intravenous contrast and without use of oral contrast. Data reconstructed in coronal and sagittal planes. Radiation dose reduction techniques were utilized, including automated exposure control and exposure modulation based on body size.   COMPARISON: MRI abdomen 01/25/2023, CT abdomen and pelvis with IV contrast 01/23/2023, CT abdomen and pelvis with IV contrast 10/29/2022.  FINDINGS: Heart size is enlarged. Lung bases appear clear. Liver, spleen, and adrenal glands appear within normal limits. 1.2 cm right upper pole renal low-density lesion that contains fat density is consistent with angiomyolipoma and this is without change. There is a posterior left renal low-density lesion measuring 9 mm and this is most likely a cyst. Pancreas appears within normal limits. There is no bowel dilatation or evidence for bowel obstruction. Sigmoid diverticulosis is present without evidence for diverticulitis. There is no evidence for intra-abdominal abscess formation. There is advanced multilevel degenerative disc disease within the lower thoracic spine and lumbar spine associated with vacuum phenomena.      1. No evidence for acute abnormality of the abdomen or pelvis. 2. Small hiatal hernia. 3. 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 Suspect small left renal cyst. 4. Previous cholecystectomy. 5. Sigmoid diverticulosis without evidence for diverticulitis.  Radiation dose reduction techniques were utilized, including automated exposure control and exposure modulation based on body size.  This report was finalized on 2/4/2023 4:13 PM by Dr. Torey Bedoya M.D.        Ordered the above noted radiological studies. Reviewed by me in PACS.          PROCEDURES  Critical Care  Performed by: Jamel Emmaunel II, MD  Authorized by: Jamel Emmanuel II, MD     Critical care provider statement:     Critical care time (minutes):  33    Critical care was necessary to treat or prevent imminent or life-threatening deterioration of the following conditions:  Circulatory failure    Critical care was time spent personally by me on the following activities:  Ordering and performing treatments and interventions, ordering and review of laboratory studies, ordering and  143 review of radiographic studies, pulse oximetry, re-evaluation of patient's condition, review of old charts, obtaining history from patient or surrogate, discussions with consultants, evaluation of patient's response to treatment, examination of patient and development of treatment plan with patient or surrogate              MEDICATIONS GIVEN IN ER  Medications   sodium chloride 0.9 % flush 10 mL (has no administration in time range)   HYDROmorphone (DILAUDID) injection 0.5 mg (has no administration in time range)   ondansetron (ZOFRAN) injection 4 mg (has no administration in time range)   pantoprazole (PROTONIX) injection 40 mg (has no administration in time range)   pantoprazole (PROTONIX) 40 mg in 100 mL NS (VTB) (has no administration in time range)   iopamidol (ISOVUE-300) 61 % injection 85 mL (85 mL Intravenous Given 2/4/23 1517)         MEDICAL DECISION MAKING, PROGRESS, and CONSULTS    All labs have been independently reviewed by me.  All radiology studies have been reviewed by me and discussed with radiologist dictating the report.   EKG's independently viewed and interpreted by me.  Discussion below represents my analysis of pertinent findings related to patient's condition, differential diagnosis, treatment plan and final disposition.      Orders placed during this visit:  Orders Placed This Encounter   Procedures   • Critical Care   • CT Abdomen Pelvis With Contrast   • Comprehensive Metabolic Panel   • Lipase   • Urinalysis With Microscopic If Indicated (No Culture) - Urine, Clean Catch   • Protime-INR   • aPTT   • CBC Auto Differential   • Manual Differential   • Monitor Blood Pressure   • Cardiac Monitoring   • Pulse Oximetry, Continuous   • Verify Informed Consent   • [facility] (on-call MD unless specified) Details   • Gastroenterology (on-call MD unless specified)   • Type & Screen   • Prepare RBC, 1 Units   • Insert Peripheral IV   • Inpatient Admission   • CBC & Differential           Differential  diagnosis:    Differential diagnosis includes but not limited to:  - hepatobiliary pathology such as cholecystitis, cholangitis, and symptomatic cholelithiasis  - Pancreatitis  - Dyspepsia  - Small bowel obstruction  - Appendicitis  - Diverticulitis  - UTI including pyelonephritis  - Ureteral stone  - Zoster  - Colitis, including infectious and ischemic  - Atypical ACS          Independent interpretation of labs, radiology studies, and discussions with consultants:  ED Course as of 02/04/23 1627   Sat Feb 04, 2023   1621 Hemoglobin(!!): 6.1  Blood transfusion has been ordered [TD]   1622 WBC(!!): 56.36  Chronically elevated as patient has known myelodysplasia [TD]   1622 Creatinine: 0.76 [TD]   1622 Sodium: 136 [TD]   1622 Potassium: 4.3 [TD]   1625 Patient has had no bloody bowel movements while in the emergency department. [TD]   1626 I discussed the case with Dr. Kumar, hospitalist.  We reviewed patient's labs, history, imaging.  Gastroenterology consult is pending at the time of admission. [TD]      ED Course User Index  [TD] Jamel Emmanuel II, MD                  PPE: The patient wore a mask throughout the entire encounter. I wore a well-fitting mask.    DIAGNOSIS  Final diagnoses:   ABLA (acute blood loss anemia)   Gastrointestinal hemorrhage, unspecified gastrointestinal hemorrhage type   Chronic anticoagulation         DISPOSITION  Admit      Latest Documented Vital Signs:  As of 16:27 EST  BP- 107/48 HR- 84 Temp- 99.2 °F (37.3 °C) (Oral) O2 sat- 94%      --    Please note that portions of this were completed with a voice recognition program.       Note Disclaimer: At [facility], we believe that sharing information builds trust and better relationships. You are receiving this note because you are receiving care at [facility] or recently visited. It is possible you will see health information before a provider has talked with you about it. This kind of information can be easy to misunderstand.  "To help you fully understand what it means for your health, we urge you to discuss this note with your provider.       Jamel Emmanuel II, MD  02/04/23 1627      Electronically signed by Jamel Emmanuel II, MD at 02/04/23 1627     Jeevan Freeman RN at 02/04/23 1645          Nursing report ED to floor  Catherine Boyer  82 y.o.  female    HPI :   Chief Complaint   Patient presents with   • Abdominal Pain       Admitting doctor:   Jaimee Macias MD    Admitting diagnosis:   The primary encounter diagnosis was ABLA (acute blood loss anemia). Diagnoses of Gastrointestinal hemorrhage, unspecified gastrointestinal hemorrhage type and Chronic anticoagulation were also pertinent to this visit.    Code status:   Current Code Status       Date Active Code Status Order ID Comments User Context       Prior            Allergies:   Aspirin, Oxycodone, and Diphenhydramine hcl    Isolation:   No active isolations    Intake and Output  No intake or output data in the 24 hours ending 02/04/23 1645    Weight:       02/04/23  1422   Weight: 66.7 kg (147 lb)       Most recent vitals:   Vitals:    02/04/23 1346 02/04/23 1422 02/04/23 1628   BP: 107/48  147/76   Pulse: 84  92   Resp: 17  14   Temp: 99.2 °F (37.3 °C)  98.6 °F (37 °C)   TempSrc: Oral     SpO2: 94%  94%   Weight:  66.7 kg (147 lb)    Height:  154.9 cm (61\")        Active LDAs/IV Access:   Lines, Drains & Airways       Active LDAs       Name Placement date Placement time Site Days    Peripheral IV 02/04/23 1418 Right Antecubital 02/04/23  1418  Antecubital  less than 1                    Labs (abnormal labs have a star):   Labs Reviewed   COMPREHENSIVE METABOLIC PANEL - Abnormal; Notable for the following components:       Result Value    Glucose 119 (*)     ALT (SGPT) 52 (*)     AST (SGOT) 35 (*)     Alkaline Phosphatase 194 (*)     All other components within normal limits    Narrative:     GFR Normal >60  Chronic Kidney Disease <60  Kidney Failure " <15    The GFR formula is only valid for adults with stable renal function between ages 18 and 70.   LIPASE - Abnormal; Notable for the following components:    Lipase 10 (*)     All other components within normal limits   PROTIME-INR - Abnormal; Notable for the following components:    Protime 15.0 (*)     INR 1.16 (*)     All other components within normal limits   APTT - Abnormal; Notable for the following components:    PTT 41.2 (*)     All other components within normal limits   CBC WITH AUTO DIFFERENTIAL - Abnormal; Notable for the following components:    WBC 56.36 (*)     RBC 1.90 (*)     Hemoglobin 6.1 (*)     Hematocrit 18.4 (*)     All other components within normal limits   MANUAL DIFFERENTIAL - Abnormal; Notable for the following components:    Neutrophil % 82.0 (*)     Lymphocyte % 2.0 (*)     Metamyelocyte % 3.0 (*)     Myelocyte % 2.0 (*)     Neutrophils Absolute 46.22 (*)     Monocytes Absolute 6.20 (*)     All other components within normal limits   URINALYSIS W/ MICROSCOPIC IF INDICATED (NO CULTURE)   TYPE AND SCREEN   PREPARE RBC   CBC AND DIFFERENTIAL    Narrative:     The following orders were created for panel order CBC & Differential.  Procedure                               Abnormality         Status                     ---------                               -----------         ------                     CBC Auto Differential[[id number]]        Abnormal            Final result                 Please view results for these tests on the individual orders.       EKG:   No orders to display       Meds given in ED:   Medications   sodium chloride 0.9 % flush 10 mL (has no administration in time range)   pantoprazole (PROTONIX) injection 40 mg (has no administration in time range)   pantoprazole (PROTONIX) 40 mg in 100 mL NS (VTB) (has no administration in time range)   iopamidol (ISOVUE-300) 61 % injection 85 mL (85 mL Intravenous Given 2/4/23 5287)   HYDROmorphone (DILAUDID) injection 0.5 mg  "(0.5 mg Intravenous Given 2/4/23 1638)   ondansetron (ZOFRAN) injection 4 mg (4 mg Intravenous Given 2/4/23 1638)       Imaging results:  CT Abdomen Pelvis With Contrast    Result Date: 2/4/2023  1. No evidence for acute abnormality of the abdomen or pelvis. 2. Small hiatal hernia. 3. 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 Suspect small left renal cyst. 4. Previous cholecystectomy. 5. Sigmoid diverticulosis without evidence for diverticulitis.  Radiation dose reduction techniques were utilized, including automated exposure control and exposure modulation based on body size.  This report was finalized on 2/4/2023 4:13 PM by Dr. Torey Bedoya M.D.       Ambulatory status:   - assist x 2      Social issues:   Social History     Socioeconomic History   • Marital status:    Tobacco Use   • Smoking status: Former     Packs/day: 1.00     Years: 20.00     Pack years: 20.00     Types: Cigarettes   • Smokeless tobacco: Never   • Tobacco comments:     30  years ago   Vaping Use   • Vaping Use: Never used   Substance and Sexual Activity   • Alcohol use: Yes     Comment: \"rare\"   • Drug use: Never   • Sexual activity: Defer       NIH Stroke Scale:         Jeevan Freeman RN  02/04/23 16:45 EST          Electronically signed by Jeevan Freeman RN at 02/04/23 1646         Blood Administration Record (From admission, onward)    Transfusions already released     Ordered     Start    02/05/23 0916  Transfuse RBC Infuse Each Unit Over: 3.5H  Transfusion        Released Time Blood Unit Number Status   02/05/23 1033   23  [id number]  [id number] Transfusing       02/05/23 0915    02/04/23 1440  Transfuse RBC Infuse Each Unit Over: 2H  Transfusion        Released Time Blood Unit Number Status   02/04/23 1556   23  [id number]  [id number] Transfusing       02/04/23 1440                   Physician Progress Notes (last 48 hours)      Orlando Sewell MD at 02/05/23 1338              Name: Catherine Boyer ADMIT: 2/4/2023 "   : 1940  PCP: Kristi Moreau APRN    MRN: [id number] LOS: 1 days   AGE/SEX: 82 y.o. female  ROOM: [facility]/1     Subjective   Subjective     Patient is lying in bed and does not appear to be any major distress and she appears chronically ill.  Does complain of intermittent epigastric pain but denies nausea, vomiting.  He denies any chest pain or shortness of breath.      Objective   Objective   Vital Signs  Temp:  [98.1 °F (36.7 °C)-99.2 °F (37.3 °C)] 98.6 °F (37 °C)  Heart Rate:  [81-97] 86  Resp:  [12-18] 16  BP: (107-147)/(48-76) 116/62  SpO2:  [93 %-99 %] 95 %  on  Flow (L/min):  [2] 2;   Device (Oxygen Therapy): nasal cannula  Body mass index is 27.78 kg/m².  Physical Exam  Constitutional:       General: She is not in acute distress.     Appearance: She is ill-appearing.   HENT:      Head: Normocephalic and atraumatic.      Mouth/Throat:      Mouth: Mucous membranes are dry.   Eyes:      Extraocular Movements: Extraocular movements intact.      Pupils: Pupils are equal, round, and reactive to light.   Cardiovascular:      Rate and Rhythm: Normal rate and regular rhythm.      Pulses: Normal pulses.      Heart sounds: Normal heart sounds.   Pulmonary:      Effort: Pulmonary effort is normal. No respiratory distress.      Breath sounds: Normal breath sounds.   Abdominal:      General: Bowel sounds are normal. There is no distension.      Palpations: Abdomen is soft.      Comments: Mild tenderness in the epigastrium   Musculoskeletal:      Cervical back: Normal range of motion and neck supple.      Left lower leg: No edema.   Skin:     General: Skin is warm and dry.   Neurological:      General: No focal deficit present.      Mental Status: She is alert and oriented to person, place, and time.   Psychiatric:         Mood and Affect: Mood normal.         Behavior: Behavior normal.       Results Review     I reviewed the patient's new clinical results.  Results from last 7 days   Lab Units 23  0806  02/05/23  0356 02/04/23  2335 02/04/23  1419   WBC 10*3/mm3  --  49.85*  --  56.36*   HEMOGLOBIN g/dL 6.8* 7.1* 7.3* 6.1*   PLATELETS 10*3/mm3  --  334  --  385     Results from last 7 days   Lab Units 02/05/23  0356 02/04/23  1419   SODIUM mmol/L 136 136   POTASSIUM mmol/L 3.6 4.3   CHLORIDE mmol/L 104 100   CO2 mmol/L 24.1 26.0   BUN mg/dL 11 13   CREATININE mg/dL 0.71 0.76   GLUCOSE mg/dL 75 119*   EGFR mL/min/1.73 84.0 77.4     Results from last 7 days   Lab Units 02/05/23  0356 02/04/23  1419   ALBUMIN g/dL 3.7 4.1   BILIRUBIN mg/dL 0.9 0.6   ALK PHOS U/L 171* 194*   AST (SGOT) U/L 23 35*   ALT (SGPT) U/L 41* 52*     Results from last 7 days   Lab Units 02/05/23  0356 02/04/23  1419   CALCIUM mg/dL 8.2* 8.6   ALBUMIN g/dL 3.7 4.1   MAGNESIUM mg/dL 2.0  --        Glucose   Date/Time Value Ref Range Status   02/05/2023 1036 102 70 - 130 mg/dL Final     Comment:     Meter: [id number] : [id number] Gamaliel Whitney MIRANDA   02/05/2023 0610 109 70 - 130 mg/dL Final     Comment:     Meter: [id number] : [id number] Yuly BENTLEY       CT Abdomen Pelvis With Contrast    Result Date: 2/4/2023  1. No evidence for acute abnormality of the abdomen or pelvis. 2. Small hiatal hernia. 3. 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 Suspect small left renal cyst. 4. Previous cholecystectomy. 5. Sigmoid diverticulosis without evidence for diverticulitis.  Radiation dose reduction techniques were utilized, including automated exposure control and exposure modulation based on body size.  This report was finalized on 2/4/2023 4:13 PM by Dr. Torey Bedoya M.D.      I have personally reviewed all medications:  Scheduled Medications  atorvastatin, 80 mg, Oral, Nightly  empagliflozin, 10 mg, Oral, Daily  ferric gluconate, 250 mg, Intravenous, Once  furosemide, 40 mg, Oral, Daily  insulin lispro, 0-7 Units, Subcutaneous, TID AC  latanoprost, 1 drop, Both Eyes, Nightly  nystatin, 5 mL, Oral, 4x Daily  ruxolitinib, 15 mg, Oral,  BID    Infusions  pantoprazole, 8 mg/hr, Last Rate: 8 mg/hr (02/05/23 1132)  sodium chloride, 150 mL/hr, Last Rate: 150 mL/hr (02/05/23 0723)    Diet  NPO Diet NPO Type: Strict NPO    I have personally reviewed:  [x]  Laboratory   [x]  Microbiology   [x]  Radiology   [x]  EKG/Telemetry  [x]  Cardiology/Vascular   [x]  Pathology    [x]  Records      Assessment/Plan     Active Hospital Problems    Diagnosis  POA   • **ABLA (acute blood loss anemia) [D62]  Yes   • Atherosclerosis of abdominal aorta (HCC) [I70.0]  Yes   • Chronic diastolic (congestive) heart failure (HCC) [I50.32]  Yes   • Personal history of transient ischemic attack (TIA), 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 deficits [Z86.73]  Not Applicable   • CAD (coronary artery disease) [I25.10]  Yes   • Myeloproliferative disorder (HCC) [D47.1]  Yes   • Paroxysmal atrial fibrillation (HCC) [I48.0]  Yes   • Type 2 diabetes mellitus with circulatory disorder, 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       1. Acute blood loss anemia, hemoglobin is 6.8 today and is receiving 1 unit of PRBC.  Patient's Plavix and Eliquis are on hold.  GI did evaluate and plans on EGD and colonoscopy.  Currently on acid suppressive therapy which will be continued.    2.  History of atrial fibrillation, currently in normal sinus rhythm and Eliquis and Plavix have been held.  We will continue with p.o. metoprolol.    3.  History of coronary artery disease, currently chest pain-free and will continue with metoprolol and resume statins upon discharge.  Plavix is on hold.    4.  History of chronic diastolic heart failure, currently compensated and home dose Lasix on hold for the moment.  She will be gently volume resuscitated.    5.  Diabetes mellitus, currently on corrective dose insulin and home dose Jardiance is on hold.    6.  Known history of myeloproliferative disorder, follows up with  hematology oncology closely as an outpatient basis.    7.  On SCDs for DVT prophylaxis.    8.  CODE STATUS is full code.     Orlando Sewell MD  [facility]  02/05/23  13:38 EST        Electronically signed by Orlando Sewell MD at 02/05/23 1341     Sarah Miles MD at 02/05/23 0844          [facility]  Inpatient Progress Note    Reason for Follow Up: Abdominal pain, reports of melenic stools, anemia    Subjective     Interval History:   Hemoglobin is 6.8 this morning, she was transfused 1 unit of packed red cells yesterday.  She was discharged to hemoglobin of 7.5 1/26/2023.    No further bowel movements, no overt bleeding.  Episode of pain last night resolved now following pain medication.  Awaiting mesenteric duplex ultrasound.    Current Facility-Administered Medications:   •  acetaminophen (TYLENOL) tablet 650 mg, 650 mg, Oral, Q4H PRN, Jaimee Macias MD  •  atorvastatin (LIPITOR) tablet 80 mg, 80 mg, Oral, Nightly, Jaimee Macias MD, 80 mg at 02/04/23 2121  •  dextrose (D50W) (25 g/50 mL) IV injection 25 g, 25 g, Intravenous, Q15 Min PRN, Faustina Barcenas APRN  •  dextrose (GLUTOSE) oral gel 15 g, 15 g, Oral, Q15 Min PRN, Faustina Barcenas APRN  •  empagliflozin (JARDIANCE) tablet 10 mg, 10 mg, Oral, Daily, Jaimee Macias MD  •  furosemide (LASIX) tablet 40 mg, 40 mg, Oral, Daily, Jaimee Macias MD  •  glucagon (GLUCAGEN) injection 1 mg, 1 mg, Intramuscular, Q15 Min PRN, Faustina Barcenas APRN  •  insulin lispro (ADMELOG) injection 0-7 Units, 0-7 Units, Subcutaneous, TID AC, Faustina Barcenas APRN  •  latanoprost (XALATAN) 0.005 % ophthalmic solution 1 drop, 1 drop, Both Eyes, Nightly, Jaimee Macias MD, 1 drop at 02/04/23 2310  •  melatonin tablet 3 mg, 3 mg, Oral, Nightly PRN, Jaimee Macias MD  •  nystatin (MYCOSTATIN) 100,000 unit/mL suspension 500,000 Units, 5 mL, Oral, 4x Daily,  Charles Diaz MD, 500,000 Units at 02/04/23 2121  •  ondansetron (ZOFRAN) tablet 4 mg, 4 mg, Oral, Q6H PRN **OR** ondansetron (ZOFRAN) injection 4 mg, 4 mg, Intravenous, Q6H PRN, Jaimee Macias MD  •  pantoprazole (PROTONIX) 40 mg in 100 mL NS (VTB), 8 mg/hr, Intravenous, Continuous, Jamel Emmanuel II, MD, Last Rate: 20 mL/hr at 02/05/23 0645, 8 mg/hr at 02/05/23 0645  •  ruxolitinib (JAKAFI) chemo tablet 15 mg, 15 mg, Oral, BID, Jaimee Macias MD, 15 mg at 02/04/23 2305  •  [COMPLETED] Insert Peripheral IV, , , Once **AND** sodium chloride 0.9 % flush 10 mL, 10 mL, Intravenous, PRN, Jamel Emmanuel II, MD  •  sodium chloride 0.9 % infusion, 150 mL/hr, Intravenous, Continuous, Jaimee Macias MD, Last Rate: 150 mL/hr at 02/05/23 0423, 150 mL/hr at 02/05/23 0423  Review of Systems:   All systems reviewed and negative except for: GI: Abdominal pain      Objective     Vital Signs  Temp:  [97.1 °F (36.2 °C)-99.2 °F (37.3 °C)] 97.1 °F (36.2 °C)  Heart Rate:  [80-97] 80  Resp:  [12-18] 16  BP: (104-147)/(48-76) 104/59  Body mass index is 27.78 kg/m².    Intake/Output Summary (Last 24 hours) at 2/5/2023 0839  Last data filed at 2/4/2023 2116  Gross per 24 hour   Intake 120 ml   Output --   Net 120 ml     No intake/output data recorded.     Physical Exam:   General: patient awake, alert and cooperative, appears stated age, elderly, hard of hearing   Eyes: Normal lids and lashes, no scleral icterus   Neck: supple, normal ROM   Skin: warm and dry, not jaundiced   Cardiovascular: regular rhythm and rate, no murmurs auscultated   Pulm: clear to auscultation bilaterally, regular and unlabored   Abdomen: soft, nontender, nondistended; normal bowel sounds   Rectal: deferred   Extremities: no rash or edema   Psychiatric: Normal mood and behavior; memory intact     Results Review:     I reviewed the patient's new clinical results.    Results from last 7 days   Lab Units 02/05/23  0838  02/05/23  0356 02/04/23  2335 02/04/23  1419   WBC 10*3/mm3  --  49.85*  --  56.36*   HEMOGLOBIN g/dL 6.8* 7.1* 7.3* 6.1*   HEMATOCRIT % 21.0* 21.5* 21.7* 18.4*   PLATELETS 10*3/mm3  --  334  --  385     Results from last 7 days   Lab Units 02/05/23  0356 02/04/23  1419   SODIUM mmol/L 136 136   POTASSIUM mmol/L 3.6 4.3   CHLORIDE mmol/L 104 100   CO2 mmol/L 24.1 26.0   BUN mg/dL 11 13   CREATININE mg/dL 0.71 0.76   CALCIUM mg/dL 8.2* 8.6   BILIRUBIN mg/dL 0.9 0.6   ALK PHOS U/L 171* 194*   ALT (SGPT) U/L 41* 52*   AST (SGOT) U/L 23 35*   GLUCOSE mg/dL 75 119*     Results from last 7 days   Lab Units 02/04/23  1419   INR  1.16*     Lab Results   Lab Value Date/Time    LIPASE 10 (L) 02/04/2023 1419    LIPASE 13 01/23/2023 0216    LIPASE 20 11/21/2022 0250    LIPASE 38 08/31/2022 2131    LIPASE 16.0 02/09/2022 0525    LIPASE 50 06/29/2021 1131       Radiology:  CT Abdomen Pelvis With Contrast   Final Result   1. No evidence for acute abnormality of the abdomen or pelvis.   2. Small hiatal hernia.   3. 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 Suspect small left   renal cyst.   4. Previous cholecystectomy.   5. Sigmoid diverticulosis without evidence for diverticulitis.       Radiation dose reduction techniques were utilized, including automated   exposure control and exposure modulation based on body size.       This report was finalized on 2/4/2023 4:13 PM by Dr. Torey Bedoya M.D.              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  Melena: With only minimal decline in her hemoglobin following discharge 1/26.  No further episodes    2.  Acute on chronic anemia: Likely related to above plus myeloproliferative disorder.  She has normocytic indices    3.  Generalized abdominal pain    4.  A-fib on Eliquis    5.  CAD on Plavix    6.  Myeloproliferative disorder: With significantly elevated WBC    7.  Normal MRI biliary ducts: Minimal intrahepatic prominence    8.  Elevated liver enzymes: Predominantly alkaline phosphatase.  Normal bilirubin.  Nonobstructive pattern.    Plan  Agree with additional unit of packed red blood cells today  Follow-up results of mesenteric duplex ultrasound  She can go on a full liquid diet following her duplex ultrasound  Continue PPI drip  Continue to hold anticoagulation for the time being  Follow H&H and transfusion as per admitting  We will appreciate otology/oncology's opinion regarding her anemia  Consideration of EGD/push enteroscopy and colonoscopy when anticoagulation is cleared from her system and she is off Plavix for 5 days if there is significant concern that she is having GI bleeding rather than a complication from her proliferative disorder causing her anemia    I discussed the patients findings and my recommendations with patient, family and nursing staff.    All necessary PPE, including face mask and eye protection, were worn during this encounter.  Hand sanitization was performed both before and after the patient interaction.    Over 25 minutes was spent reviewing the patient's chart and records, in discussion with the patient, in examination of the patient, and in discussion with members of the patient's medical team.    Sarah Miles,  MD      Electronically signed by Sarah Miles MD at 02/05/23 0972          Consult Notes (last 48 hours)      Ramirez Oliva MD at 02/05/23 0978            Subjective     REASON FOR CONSULTATION:    Evaluation and management for myeloproliferative disease                             REQUESTING PHYSICIAN: Dr. Donato MD    RECORDS OBTAINED:  Records of the patients history including those obtained from the referring provider were reviewed and summarized in detail.    HISTORY OF PRESENT ILLNESS:  The patient is a 82 y.o. year old female with medical history significant for JAK2 positive myeloproliferative disorder, perhaps primary myelofibrosis, A-fib on chronic Eliquis, CAD with stents and on Plavix, history of endocarditis and TIA had presented to [facility] ER on 2/4/2023 with abdominal pain and black stools for several days.    Lab work revealed hemoglobin of 6.1, WBC count of 56.36 and platelet count of 385,000.  CT A/P noted no evidence of acute abnormality in the abdomen or pelvis.  Small hiatal hernia.  Previous cholecystectomy.  Sigmoid diverticulosis without evidence of diverticulitis.    Of note, patient was hospitalized around 2 weeks ago with abdominal pain.  MRCP on 1/25/2023 had shown minimal intrahepatic biliary prominence, no CBD/PD dilatation, 5 mm lesion in the body of pancreas possibly representing IPMN.      EGD performed 1/25/2023 had noted mild mucosal changes characterized by slowing in the lower third of the esophagus.  Patchy mild inflammation characterized by congestion and erythema found in the gastric body.  The examined duodenum was normal.    Patient has been seen by GI.  Started on PPI.  Eliquis and Plavix on hold.  Pending clinical course, plan to consider colonoscopy +/- EGD/push enteroscopy.    Hematology/oncology consulted for further evaluation and management.  Patient resting comfortably in bed.  Says she had a BM today which was dark brown in color.  Denies  any bleeding from anywhere else.    Past Medical History:   Diagnosis Date   • Atrial fibrillation (HCC)    • Breast cancer (HCC)    • CAD (coronary artery disease)     NSTEMI 2/2022: 90% ostial LAD, 99% D1, 70% mid-distal LAD (medical therapy). She received two stents (2.5x18 and 2.5x26mm [place] LEFTY) but I don't know which one went to which lesion.   • Carotid atherosclerosis    • Chronic diastolic (congestive) heart failure (HCC)    • GERD (gastroesophageal reflux disease)    • Glaucoma    • History of cataract    • Hypertension    • Microcytic anemia     per Dr. oleg pate office  note 6/30/22-dd   • Myeloproliferative disorder (HCC)     JAK2 positive   • Nonbacterial thrombotic endocarditis     6/2021: 4x5mm vegetation on the ventricular surface of the anterior MV, negative blood cultures   • Porcelain gallbladder    • PUD (peptic ulcer disease)    • TIA (transient ischemic attack)    • Type 2 diabetes mellitus (HCC)    • Upper GI bleed         Past Surgical History:   Procedure Laterality Date   • BREAST LUMPECTOMY  1999   • CARDIAC CATHETERIZATION N/A 09/02/2022    Procedure: Coronary angiography;  Surgeon: Guero Verde MD;  Location: [facility] CATH INVASIVE LOCATION;  Service: Cardiovascular;  Laterality: N/A;   • CARDIAC CATHETERIZATION N/A 09/02/2022    Procedure: Stent LEFTY coronary;  Surgeon: Guero Verde MD;  Location: [facility] CATH INVASIVE LOCATION;  Service: Cardiovascular;  Laterality: N/A;   • CATARACT EXTRACTION  2011   • CHOLECYSTECTOMY WITH INTRAOPERATIVE CHOLANGIOGRAM N/A 11/28/2022    Procedure: CHOLECYSTECTOMY LAPAROSCOPIC INTRAOPERATIVE CHOLANGIOGRAM;  Surgeon: Dani Grover Jr., MD;  Location: [facility] MAIN OR;  Service: General;  Laterality: N/A;   • ENDOSCOPY N/A 1/25/2023    Procedure: ESOPHAGOGASTRODUODENOSCOPY WITH BIOPSIES;  Surgeon: Charles Diaz MD;  Location: [facility] ENDOSCOPY;  Service: Gastroenterology;  Laterality: N/A;  pre: abd pain, nausea  post: hiatal hernia, mild  gastritis, sloughing of the esophagus   • JOINT REPLACEMENT          No current facility-administered medications on file prior to encounter.     Current Outpatient Medications on File Prior to Encounter   Medication Sig Dispense Refill   • apixaban (Eliquis) 5 MG tablet tablet Take 1 tablet by mouth Every 12 (Twelve) Hours. 180 tablet 3   • atorvastatin (LIPITOR) 80 MG tablet Take 1 tablet by mouth Every Night. 90 tablet 0   • bimatoprost (LUMIGAN) 0.01 % ophthalmic drops Apply 1 drop to eye(s) as directed by provider.     • clopidogrel (PLAVIX) 75 MG tablet Take 1 tablet by mouth Daily. 30 tablet 0   • empagliflozin (JARDIANCE) 10 MG tablet tablet Take 1 tablet by mouth Daily. 90 tablet 3   • furosemide (LASIX) 40 MG tablet Take 1 tablet by mouth Daily. 90 tablet 3   • Jakafi 15 MG chemo tablet Take 1 tablet by mouth 2 (Two) Times a Day. 60 tablet 3   • latanoprost (XALATAN) 0.005 % ophthalmic solution Administer 1 drop to both eyes.     • metoprolol succinate XL (TOPROL-XL) 25 MG 24 hr tablet 25 mg 2 (Two) Times a Day.     • nystatin (MYCOSTATIN) 100,000 unit/mL suspension Swish and swallow 5 mL 4 (Four) Times a Day for 14 days. 60 mL 0   • ondansetron (ZOFRAN) 4 MG tablet Take 1 tablet by mouth Every 6 (Six) Hours As Needed for Nausea or Vomiting. 30 tablet 0   • pantoprazole (PROTONIX) 40 MG EC tablet Take 40 mg by mouth Daily.     • sertraline (ZOLOFT) 50 MG tablet Take 50 mg by mouth Daily.     • acetaminophen (TYLENOL) 500 MG tablet Take 500 mg by mouth As Needed.          ALLERGIES:    Allergies   Allergen Reactions   • Aspirin Unknown - Low Severity   • Oxycodone Unknown - Low Severity   • Diphenhydramine Hcl Anxiety and Unknown (See Comments)     Benadryl IVP        Social History     Socioeconomic History   • Marital status:    Tobacco Use   • Smoking status: Former     Packs/day: 1.00     Years: 20.00     Pack years: 20.00     Types: Cigarettes   • Smokeless tobacco: Never   • Tobacco comments:  "    30  years ago   Vaping Use   • Vaping Use: Never used   Substance and Sexual Activity   • Alcohol use: Yes     Comment: \"rare\"   • Drug use: Never   • Sexual activity: Defer        Family History   Problem Relation Age of Onset   • Ovarian cancer Mother    • Lung cancer Father    • Lung cancer Sister    • Malig Hyperthermia Neg Hx         Review of Systems   GENERAL: No change in appetite or weight;   No fevers, chills, sweats.    SKIN: No nonhealing lesions.   No rashes.  HEME/LYMPH: No easy bruising, bleeding.   No swollen nodes.   EYES: No vision changes or diplopia.   ENT: No tinnitus, hearing loss, gum bleeding, epistaxis, hoarseness or dysphagia.   RESPIRATORY: No cough, shortness of breath, hemoptysis or wheezing.   CVS: No chest pain, palpitations, orthopnea, dyspnea on exertion or PND.   GI: No melena or hematochezia.   No abdominal pain.  No nausea, vomiting, constipation, diarrhea  : No lower tract obstructive symptoms, dysuria or hematuria.   MUSCULOSKELETAL: No bone pain.  No joint stiffness.   NEUROLOGICAL: No global weakness, loss of consciousness or seizures.   PSYCHIATRIC: No increased nervousness, mood changes or depression.     Objective     Vitals:    02/04/23 2315 02/05/23 0320 02/05/23 0504 02/05/23 0755   BP: 134/69 119/53  104/59   BP Location: Left arm Left arm  Left arm   Patient Position: Lying Lying  Lying   Pulse: 91 81  80   Resp: 18 18  16   Temp: 98.5 °F (36.9 °C) 98.1 °F (36.7 °C)  97.1 °F (36.2 °C)   TempSrc: Oral Oral  Oral   SpO2: 97% 99% 93% 97%   Weight:       Height:         Current Status 12/19/2022   ECOG score 0       Physical Exam    CONSTITUTIONAL:  Vital signs reviewed.  No distress, looks comfortable.  Elderly female.  EYES:  Conjunctiva and lids unremarkable.    EARS,NOSE,MOUTH,THROAT:  Ears and nose appear unremarkable.    RESPIRATORY:  Normal respiratory effort.  Lungs clear to auscultation bilaterally.  CARDIOVASCULAR:  Normal S1, S2.  No murmurs rubs or " gallops.  No significant lower extremity edema.  GASTROINTESTINAL: Abdomen appears unremarkable.  Nondistended   LYMPHATIC:  No cervical, supraclavicular lymphadenopathy.  SKIN:  Warm.  No rashes.  PSYCHIATRIC:  Normal judgment and insight.  Normal mood and affect.  NEURO: AAOx3, no obvious focal deficits.      RECENT LABS:  Hematology WBC   Date Value Ref Range Status   02/05/2023 49.85 (C) 3.40 - 10.80 10*3/mm3 Final     RBC   Date Value Ref Range Status   02/05/2023 2.22 (L) 3.77 - 5.28 10*6/mm3 Final     Hemoglobin   Date Value Ref Range Status   02/05/2023 6.8 (C) 12.0 - 15.9 g/dL Final     Hematocrit   Date Value Ref Range Status   02/05/2023 21.0 (L) 34.0 - 46.6 % Final     Platelets   Date Value Ref Range Status   02/05/2023 334 140 - 450 10*3/mm3 Final          Assessment & Plan   Ms. Boyer is a pleasant 82-year-old female with medical history significant for JAK2 positive myeloproliferative disorder, perhaps primary myelofibrosis, A-fib on chronic Eliquis, CAD with stents and on Plavix admitted with GI bleed.    #GI bleed, likely upper:  · Presented with abdominal pain and black stool.  Hemoglobin 6.1 on presentation.  · EGD performed during last admission on 1/25/2023 had noted mild mucosal changes characterized by slowing in the lower third of the esophagus.  Patchy mild inflammation characterized by congestion and erythema found in the gastric body.  The examined duodenum was normal.  Multiple biopsies taken.  · GI on board.  Thinks she may have oozed from the prior biopsy site.  · Anticoagulation with Eliquis and Plavix on hold.  On PPI    #Severe anemia, likely blood loss related in the setting of primary myelofibrosis:  · Hemoglobin 6.1 on admission.  Iron profile showed normal iron level of 63, ferritin of 115 and iron saturation of 12%.  · Hemoglobin did improve to 7.3 after transfusion, however dropped again to 6.8.  Being transfused 1 unit PRBC.  · Continue transfusion to keep hemoglobin > 7.    · Will hold jakafi as it can be associated with anemia and hemorrhage.  · Will administer IV iron as well.    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 Reinoso in our practice.  · She is currently on Jakafi 15 mg BID as outpatient.  · Given some risk of hemorrhage associated with jakafi, will hold hold Jakafi while inpatient.    #Leukocytosis, neutrophilic predominant:   · WBC count 56.36 on admission on 2023  · This is likely myeloproliferative disease related.  His WBC count has ranged between 30- 50,000 since 2022.  · Last bone marrow biopsy from 2022 had shown hypercellular marrow at 95% with involvement by chronic myeloproliferative neoplasm, less than 5% blasts.  Mild reticulin fibrosis.  Decreased iron stores.  Consideration of CMML.    · Will monitor for now.  Consider hydroxyurea if worsening leukocytosis.    #A-fib: Eliquis on hold    #CAD s/p PCI: Plavix on hold.    Recommendations:  -Hold jakafi for now  -IV iron  -Will consider hydroxyurea if worsening leukocytosis  -Transfuse to keep hemoglobin > 7  -Rest per GI and primary  -We will continue to follow      Electronically signed by Ramirez Oliva MD at 23 1446     Jj Navarro III, MD at 23 0659      Consult Orders    1. Inpatient Cardiology Consult [[id number]] ordered by Jaimee Macias MD at 23               Patient Name: Catherine Boyer  :1940  82 y.o.    Date of Admission: 2023  Encounter Provider: Jj Navarro III, MD  Date of Encounter Visit: 23  Place of Service: [facility] CARDIOLOGY  Referring Provider: Jaimee Macias MD  Patient Care Team:  Kristi Moreau APRN as PCP - General (Nurse Practitioner)  Gerard Foreman MD as Referring Physician (Medical Oncology)  Darrell Reinoso MD as Consulting Physician (Hematology and Oncology)  Albin Barriga MD as Consulting  Physician (Cardiology)  Richard Aldana, RN as       Chief complaint:      History of Present Illness: She is a patient of Dr. Barriga's with hypertension, paroxysmal atrial fibrillation, chronic diastolic CHF, and coronary artery disease.  Additional history includes diabetes, GERD, TIA, myeloproliferative disorder, nonbacterial endocarditis, and right-sided breast cancer status post lumpectomy and radiation.    She presented to the ED yesterday with recurrent abdominal pain and associated nausea, vomiting, and black stool.  We have been asked to see here because she is on Plavix.  Patient currently was sleeping on my arrival, awakens, denies any chest pain or chest discomfort and then went back to sleep.  Reportedly the black stools have been present since her discharge last month.  On arrival her hemoglobin was 6.1.  She was given a transfusion of packed red blood cells admitted for further evaluation and treatment.  GI was consulted and recommended holding both Eliquis and the clopidogrel.  Their plan is that when she is off clopidogrel for 5 days may consider colonoscopy and or EGD/push enteroscopy.  They order a mesenteric duplex ultrasound.    She has not had any complaints of anginal chest pain while she has been here.  No shortness of breath.  No complaints of palpitations or tachycardia, no presyncope or syncope.    In February 2022, she presented with heartburn symptoms and chest pain.  She was markedly anemic with a hemoglobin of 5.5 and required PRBC infusion.  She was diagnosed with a non-STEMI.  She underwent stent placement to the diagonal.  She had a long lesion in the mid distal LAD that was treated medically.  EF was 45-50%.      In 8/2022, she presented with chest pain and shortness of breath. She was treated for acute on chronic diastolic heart failure with hypoxia.  Stress test was positive. Subsequently, she underwent a LEFTY to large diagonal branch for in-stent stenosis.  She has   of mid RCA and nonobstructive disease of the LAD.  There was no stent in LAD.  Her shortness of breath improved with IV diuresis and she was discharged on 2 L of oxygen nasal cannula.  her lisinopril was discontinued and she was started on  empagliflozin and discharged on her home dose of furosemide 40 mg daily.    In November 2022, she presented to the ED with abdominal pain.  Further evaluation demonstrated a UTI for which a cholecystectomy was recommended.  We were consulted for preoperative clearance given her recent PCI.  She was cleared to hold the Eliquis and clopidogrel but we recommended she take aspirin perioperatively.  Subsequently, she underwent a laparoscopic cholecystectomy with intraoperative cholangiogram.    She was admitted last month for epigastric pain.  She underwent an EGD demonstrating mucosal changes in the esophagus, gastric and HH.  MRI of the abdomen demonstrated no obstruction or stone but did reveal a small pancreatic cystic lesion felt to be a pseudocyst.  ID was consulted for acute UTI.  However, the urine was ultimately felt to be colonized with ESBL.  She was discharged on 1/26.        Past Medical History:   Diagnosis Date   • Atherosclerosis of abdominal aorta (HCC) 2/5/2023   • Atrial fibrillation (HCC)    • Breast cancer (HCC)    • CAD (coronary artery disease)     NSTEMI 2/2022: 90% ostial LAD, 99% D1, 70% mid-distal LAD (medical therapy). She received two stents (2.5x18 and 2.5x26mm [place] LEFTY) but I don't know which one went to which lesion.   • Carotid atherosclerosis    • Chronic diastolic (congestive) heart failure (HCC)    • GERD (gastroesophageal reflux disease)    • Glaucoma    • History of cataract    • Hypertension    • Microcytic anemia     per Dr. oleg pate office  note 6/30/22-dd   • Myeloproliferative disorder (HCC)     JAK2 positive   • Nonbacterial thrombotic endocarditis     6/2021: 4x5mm vegetation on the ventricular surface of the anterior MV, negative blood  cultures   • Porcelain gallbladder    • PUD (peptic ulcer disease)    • TIA (transient ischemic attack)    • Type 2 diabetes mellitus (HCC)    • Type 2 diabetes mellitus with circulatory disorder, without long-term current use of insulin (HCC) 7/14/2022   • Upper GI bleed        Past Surgical History:   Procedure Laterality Date   • BREAST LUMPECTOMY  1999   • CARDIAC CATHETERIZATION N/A 09/02/2022    Procedure: Coronary angiography;  Surgeon: Guero Verde MD;  Location: [facility] CATH INVASIVE LOCATION;  Service: Cardiovascular;  Laterality: N/A;   • CARDIAC CATHETERIZATION N/A 09/02/2022    Procedure: Stent LEFTY coronary;  Surgeon: Guero Verde MD;  [facility];  Service: Cardiovascular;  Laterality: N/A;   • CATARACT EXTRACTION  2011   • CHOLECYSTECTOMY WITH INTRAOPERATIVE CHOLANGIOGRAM N/A 11/28/2022    Procedure: CHOLECYSTECTOMY LAPAROSCOPIC INTRAOPERATIVE CHOLANGIOGRAM;  Surgeon: Dani Grover Jr., MD;  Location: [facility] MAIN OR;  Service: General;  Laterality: N/A;   • ENDOSCOPY N/A 1/25/2023    Procedure: ESOPHAGOGASTRODUODENOSCOPY WITH BIOPSIES;  Surgeon: Charles Diaz MD;  Location: [facility] ENDOSCOPY;  Service: Gastroenterology;  Laterality: N/A;  pre: abd pain, nausea  post: hiatal hernia, mild gastritis, sloughing of the esophagus   • JOINT REPLACEMENT           Prior to Admission medications    Medication Sig Start Date End Date Taking? Authorizing Provider   apixaban (Eliquis) 5 MG tablet tablet Take 1 tablet by mouth Every 12 (Twelve) Hours. 11/18/22  Yes Estrellita Blanco APRN   atorvastatin (LIPITOR) 80 MG tablet Take 1 tablet by mouth Every Night. 12/23/22  Yes Estrellita Blanco APRN   bimatoprost (LUMIGAN) 0.01 % ophthalmic drops Apply 1 drop to eye(s) as directed by provider.   Yes Provider, MD Marya   clopidogrel (PLAVIX) 75 MG tablet Take 1 tablet by mouth Daily. 12/23/22  Yes Estrellita Blanco APRN   empagliflozin (JARDIANCE) 10 MG tablet tablet Take  "1 tablet by mouth Daily. 9/12/22  Yes Estrellita Blanco APRN   furosemide (LASIX) 40 MG tablet Take 1 tablet by mouth Daily. 1/27/23  Yes Ysabel Llamas APRN   Jakafi 15 MG chemo tablet Take 1 tablet by mouth 2 (Two) Times a Day. 9/28/22  Yes Darrell Reinoso MD   latanoprost (XALATAN) 0.005 % ophthalmic solution Administer 1 drop to both eyes. 3/14/22  Yes ProviderMarya MD   metoprolol succinate XL (TOPROL-XL) 25 MG 24 hr tablet 25 mg 2 (Two) Times a Day. 3/9/22  Yes ProviderMarya MD   nystatin (MYCOSTATIN) 100,000 unit/mL suspension Swish and swallow 5 mL 4 (Four) Times a Day for 14 days. 2/3/23 2/17/23 Yes Charles Diaz MD   ondansetron (ZOFRAN) 4 MG tablet Take 1 tablet by mouth Every 6 (Six) Hours As Needed for Nausea or Vomiting. 11/22/22  Yes Ila Su APRN   pantoprazole (PROTONIX) 40 MG EC tablet Take 40 mg by mouth Daily. 4/14/22  Yes ProviderMarya MD   sertraline (ZOLOFT) 50 MG tablet Take 50 mg by mouth Daily. 4/5/22  Yes ProviderMarya MD   acetaminophen (TYLENOL) 500 MG tablet Take 500 mg by mouth As Needed.    Provider, MD Marya       Allergies   Allergen Reactions   • Aspirin Unknown - Low Severity   • Oxycodone Unknown - Low Severity   • Diphenhydramine Hcl Anxiety and Unknown (See Comments)     Benadryl IVP       Social History     Socioeconomic History   • Marital status:    Tobacco Use   • Smoking status: Former     Packs/day: 1.00     Years: 20.00     Pack years: 20.00     Types: Cigarettes   • Smokeless tobacco: Never   • Tobacco comments:     30  years ago   Vaping Use   • Vaping Use: Never used   Substance and Sexual Activity   • Alcohol use: Yes     Comment: \"rare\"   • Drug use: Never   • Sexual activity: Defer       Family History   Problem Relation Age of Onset   • Ovarian cancer Mother    • Lung cancer Father    • Lung cancer Sister    • Malig Hyperthermia Neg Hx        REVIEW OF SYSTEMS:   All other systems reviewed and negative. "       Objective:     Vitals:    02/05/23 0504 02/05/23 1026 02/05/23 1030 02/05/23 1100   BP:  126/63 126/63 116/62   BP Location:  Left arm     Patient Position:  Lying     Pulse:  92 92 86   Resp:  16 16 16   Temp:  98.6 °F (37 °C) 98.6 °F (37 °C) 98.6 °F (37 °C)   TempSrc:  Oral Oral Oral   SpO2: 93% 97% 97% 95%   Weight:       Height:         Body mass index is 27.78 kg/m².    Intake/Output Summary (Last 24 hours) at 2/5/2023 1122  Last data filed at 2/4/2023 2116  Gross per 24 hour   Intake 120 ml   Output --   Net 120 ml     General Appearance:    comfortable, in no acute distress   Head:    Normocephalic, without obvious abnormality, atraumatic   Eyes:            Lids and lashes normal, conjunctivae and sclerae normal, no   icterus, no pallor, corneas clear, PERRLA   Ears:    Ears appear intact with no abnormalities noted   Throat:   No oral lesions, no thrush, oral mucosa moist   Neck:   No adenopathy, supple, trachea midline, no thyromegaly, no   carotid bruit, no JVD   Back:     No kyphosis present, no scoliosis present, no skin lesions, erythema or scars, no tenderness to percussion or palpation, range of motion normal   Lungs:     Clear to auscultation,respirations regular, even and unlabored    Heart:    Regular rhythm and normal rate, normal S1 and S2, no murmur, no gallop, no rub, no click   Chest Wall:    No abnormalities observed   Abdomen:     Normal bowel sounds, no masses, no organomegaly, soft        non-tender, non-distended, no guarding, no rebound  tenderness   Extremities:   Moves all extremities well, no edema, no cyanosis, no redness   Pulses:   Pulses palpable and equal bilaterally. Normal radial, carotid, femoral, dorsalis pedis and posterior tibial pulses bilaterally. Normal abdominal aorta   Skin:  Psychiatric:   No bleeding, bruising or rash    Alert and oriented x 3, normal mood and affect           Lab Review:     Results from last 7 days   Lab Units 02/05/23  0356   SODIUM mmol/L  136   POTASSIUM mmol/L 3.6   CHLORIDE mmol/L 104   CO2 mmol/L 24.1   BUN mg/dL 11   CREATININE mg/dL 0.71   CALCIUM mg/dL 8.2*   BILIRUBIN mg/dL 0.9   ALK PHOS U/L 171*   ALT (SGPT) U/L 41*   AST (SGOT) U/L 23   GLUCOSE mg/dL 75         Results from last 7 days   Lab Units 02/05/23  0806 02/05/23  0356   WBC 10*3/mm3  --  49.85*   HEMOGLOBIN g/dL 6.8* 7.1*   HEMATOCRIT % 21.0* 21.5*   PLATELETS 10*3/mm3  --  334     Results from last 7 days   Lab Units 02/04/23  1419   INR  1.16*   APTT seconds 41.2*     Results from last 7 days   Lab Units 02/05/23  0356   MAGNESIUM mg/dL 2.0     Results for orders placed during the hospital encounter of 08/31/22    Echocardiogram 2D complete    Interpretation Summary  · Left ventricular ejection fraction appears to be 61 - 65%. Left ventricular systolic function is normal.Regina is hypokinetic.  · Left ventricular diastolic function is consistent with (grade II w/high LAP) pseudonormalization.  · There is echogenic mobile mass on the ventricular surface of a calcified and thickened non-coronary cusp of the aortic valve. An echo from 09/30/2021 reported similar finding.DDX old vegetation, mass of calcium extending from the noncoronary cusp of the aortic valve.  · The left atrial cavity is moderate to severely dilated.          I personally viewed and interpreted the patient's EKG/Telemetry data.      Assessment and Plan:       Active Hospital Problems    Diagnosis  POA   • **ABLA (acute blood loss anemia) [D62]  Yes   • Atherosclerosis of abdominal aorta (HCC) [I70.0]  Yes   • Chronic diastolic (congestive) heart failure (HCC) [I50.32]  Yes   • Personal history of transient ischemic attack (TIA), 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 deficits [Z86.73]  Not Applicable   • CAD (coronary artery disease) [I25.10]  Yes   • Myeloproliferative disorder (HCC) [D47.1]  Yes   • Paroxysmal atrial fibrillation (HCC) [I48.0]  Yes   • Type 2 diabetes mellitus with circulatory disorder, without  long-term current use of insulin (HCC) [E11.59]  Yes      Resolved Hospital Problems   No resolved problems to display.     1.  Coronary Artery Disease: Status post stent placement to the diagonal in February 2022,  stent placement to the diagonal for restenosis 9/2/2022.    Has been on chronic Plavix therapy, held with GI bleed.  She has a class I indication for continued therapy with antiplatelet therapy if GI status will allow.  Patient is at increased risk for restenosis while off antiplatelet therapy.     2.    Acute blood loss anemia, GI following, Eliquis and clopidogrel held at this point     3.  Nonbacterial thrombotic endocarditis: Diagnosed in June 2021.    Stable     4.  Paroxysmal Atrial Fibrillation: Currently in a normal sinus rhythm.    Patient has a [id number] score of 9, which puts her in a high risk category with a yearly risk of CVA of 15.2% if off anticoagulation.  Eliquis currently held, should be resumed once appropriate and if possible from a GI standpoint.    5.  Hypertension: Blood pressure stable.     6.  Chronic HFpEF, no evidence of volume overload    Jj Navarro III, MD  02/05/23  11:22 EST            Electronically signed by Jj Navarro III, MD at 02/05/23 1256     Charles Diaz MD at 02/04/23 1306      Consult Orders    1. Gastroenterology (on-call MD unless specified) [[id number]] ordered by Jamel Emmanuel II, MD at 02/04/23 5197               Chief Complaint   Patient presents with   • Abdominal Pain       Catherine Boyer is a 82 y.o. female who presents with abdominal pain    HPI  Mrs. Boyer is an 82 yoF w h/o CAD (stents 2/2022) on Plavix, Afib on Eliquis, chronic diastolic HF, myeloproliferative disorder/anemia following with Dr. Reinoso of Hematology on Jakafi (chronically elevated WBC) who presents with abdominal pain and black stools.  She was noted to have Hb 6.1 down from 7.5 at time of discharge on 1/26.   She reports she has had black loose stools since  discharge.  She reports she was doing well otherwise until yesterday.  She reports eating [place] chili and had central abdominal pain and nausea.  Pain radiated to her back.  She is afraid to eat due to pain.   She was admitted last month for abd pain, nausea worse with eating with pain radiating to back. She had  h/o porcelain GB and is s/p cholecystectomy with negative IOC 11/28/2022.  AST/ALT/TBili normal last admission, although her ALP is elevated and she has had periodic ALP elevations in the past ( in June 2021).  CT abd/pelvis with some biliary dilation that may be related to post-CCY status, moderate HH, normal appearing pancreas, mild splenomegaly.  MR abd w MRCP on 1/25 showed minimal intrahepatic biliary prominence, CBD normal caliber and distal tapering without intraluminal filling defects, cystic duct remnant unremarkable, no PD dilatation, 5 mm lesion in body of pancreas possibly representing IPMN, some periportal enhancement.  EGD last visit with sloughing of mucosa in esophagus (bx: candida), 3 cm HH, mild gastritis (bx no H pylori), normal duodenum (bx: normal).  CT abd/pelvis w contrast this admission with no acute findings.  She c/o black stools.  BUN is normal.   Lipase normal.  WBC 56 (myeloproliferative disorder) with normal platelets.   Past Medical History:   Diagnosis Date   • Atrial fibrillation (HCC)    • Breast cancer (HCC)    • CAD (coronary artery disease)     NSTEMI 2/2022: 90% ostial LAD, 99% D1, 70% mid-distal LAD (medical therapy). She received two stents (2.5x18 and 2.5x26mm [place] LEFTY) but I don't know which one went to which lesion.   • Carotid atherosclerosis    • Chronic diastolic (congestive) heart failure (HCC)    • GERD (gastroesophageal reflux disease)    • Glaucoma    • History of cataract    • Hypertension    • Microcytic anemia     per Dr. oleg pate office  note 6/30/22-dd   • Myeloproliferative disorder (HCC)     JAK2 positive   • Nonbacterial thrombotic  endocarditis     6/2021: 4x5mm vegetation on the ventricular surface of the anterior MV, negative blood cultures   • Porcelain gallbladder    • PUD (peptic ulcer disease)    • TIA (transient ischemic attack)    • Type 2 diabetes mellitus (HCC)    • Upper GI bleed        Past Surgical History:   Procedure Laterality Date   • BREAST LUMPECTOMY  1999   • CARDIAC CATHETERIZATION N/A 09/02/2022    Procedure: Coronary angiography;  Surgeon: Guero Verde MD;  Location: [facility] CATH INVASIVE LOCATION;  Service: Cardiovascular;  Laterality: N/A;   • CARDIAC CATHETERIZATION N/A 09/02/2022    Procedure: Stent LEFTY coronary;  Surgeon: Guero Verde MD;  Location: [facility] CATH INVASIVE LOCATION;  Service: Cardiovascular;  Laterality: N/A;   • CATARACT EXTRACTION  2011   • CHOLECYSTECTOMY WITH INTRAOPERATIVE CHOLANGIOGRAM N/A 11/28/2022    Procedure: CHOLECYSTECTOMY LAPAROSCOPIC INTRAOPERATIVE CHOLANGIOGRAM;  Surgeon: Dani Grover Jr., MD;  Location: [facility] MAIN OR;  Service: General;  Laterality: N/A;   • ENDOSCOPY N/A 1/25/2023    Procedure: ESOPHAGOGASTRODUODENOSCOPY WITH BIOPSIES;  Surgeon: Charles Diaz MD;  Location: [facility] ENDOSCOPY;  Service: Gastroenterology;  Laterality: N/A;  pre: abd pain, nausea  post: hiatal hernia, mild gastritis, sloughing of the esophagus   • JOINT REPLACEMENT           Current Facility-Administered Medications:   •  pantoprazole (PROTONIX) 40 mg in 100 mL NS (VTB), 8 mg/hr, Intravenous, Continuous, Jamel Emmanuel II, MD, Last Rate: 20 mL/hr at 02/04/23 1738, 8 mg/hr at 02/04/23 1738  •  [COMPLETED] Insert Peripheral IV, , , Once **AND** sodium chloride 0.9 % flush 10 mL, 10 mL, Intravenous, PRN, Jamel Emmanuel II, MD    Current Outpatient Medications:   •  acetaminophen (TYLENOL) 500 MG tablet, Take 500 mg by mouth As Needed., Disp: , Rfl:   •  apixaban (Eliquis) 5 MG tablet tablet, Take 1 tablet by mouth Every 12 (Twelve) Hours., Disp: 180 tablet, Rfl: 3  •  atorvastatin  (LIPITOR) 80 MG tablet, Take 1 tablet by mouth Every Night., Disp: 90 tablet, Rfl: 0  •  bimatoprost (LUMIGAN) 0.01 % ophthalmic drops, Apply 1 drop to eye(s) as directed by provider., Disp: , Rfl:   •  clopidogrel (PLAVIX) 75 MG tablet, Take 1 tablet by mouth Daily., Disp: 30 tablet, Rfl: 0  •  empagliflozin (JARDIANCE) 10 MG tablet tablet, Take 1 tablet by mouth Daily., Disp: 90 tablet, Rfl: 3  •  furosemide (LASIX) 40 MG tablet, Take 1 tablet by mouth Daily., Disp: 90 tablet, Rfl: 3  •  Jakafi 15 MG chemo tablet, Take 1 tablet by mouth 2 (Two) Times a Day., Disp: 60 tablet, Rfl: 3  •  latanoprost (XALATAN) 0.005 % ophthalmic solution, Administer 1 drop to both eyes., Disp: , Rfl:   •  metoprolol succinate XL (TOPROL-XL) 25 MG 24 hr tablet, 25 mg 2 (Two) Times a Day., Disp: , Rfl:   •  nystatin (MYCOSTATIN) 100,000 unit/mL suspension, Swish and swallow 5 mL 4 (Four) Times a Day for 14 days., Disp: 60 mL, Rfl: 0  •  ondansetron (ZOFRAN) 4 MG tablet, Take 1 tablet by mouth Every 6 (Six) Hours As Needed for Nausea or Vomiting., Disp: 30 tablet, Rfl: 0  •  pantoprazole (PROTONIX) 40 MG EC tablet, Take 40 mg by mouth Daily., Disp: , Rfl:   •  sertraline (ZOLOFT) 50 MG tablet, Take 50 mg by mouth Daily., Disp: , Rfl:   •  zolpidem (Ambien) 5 MG tablet, Take 1 tablet by mouth At Night As Needed for Sleep (Bring to the sleep lab, do not take at home.). (Patient taking differently: Take 5 mg by mouth At Night As Needed for Sleep (Bring to the sleep lab, do not take at home.). Only 1x dose for sleep study in feb), Disp: 1 tablet, Rfl: 0    Allergies   Allergen Reactions   • Aspirin Unknown - Low Severity   • Oxycodone Unknown - Low Severity   • Diphenhydramine Hcl Anxiety and Unknown (See Comments)     Benadryl IVP       Social History     Socioeconomic History   • Marital status:    Tobacco Use   • Smoking status: Former     Packs/day: 1.00     Years: 20.00     Pack years: 20.00     Types: Cigarettes   • Smokeless  "tobacco: Never   • Tobacco comments:     30  years ago   Vaping Use   • Vaping Use: Never used   Substance and Sexual Activity   • Alcohol use: Yes     Comment: \"rare\"   • Drug use: Never   • Sexual activity: Defer       Family History   Problem Relation Age of Onset   • Ovarian cancer Mother    • Lung cancer Father    • Lung cancer Sister    • Malig Hyperthermia Neg Hx        Review of Systems   Constitutional: Negative for chills and fever.   Respiratory: Negative for cough and shortness of breath.    Gastrointestinal: Positive for abdominal pain, blood in stool and nausea. Negative for abdominal distention and vomiting.   Skin: Negative for color change and rash.   All other systems reviewed and are negative.      Vitals:    02/04/23 1645   BP: 123/59   Pulse: 97   Resp: 12   Temp: 98.8 °F (37.1 °C)   SpO2: 99%       Physical Exam   General: Patient awake, alert and cooperative   Eyes: Normal lids and lashes, no scleral icterus   Neck: Supple, normal ROM   Skin: Warm and dry, not jaundiced   Cardiovascular: Regular rate, regular rhythm, well-perfused extremities   Pulm: Equal expansion bilaterally, no increased WOB   Abdomen: Soft, nontender, nondistended;    Extremities: No rash or edema              Neuro: A&O, no obvious sign of focal deficit   Psychiatric: Normal mood and behavior    CT Abdomen Pelvis With Contrast    Result Date: 2/4/2023  1. No evidence for acute abnormality of the abdomen or pelvis. 2. Small hiatal hernia. 3. 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 Suspect small left renal cyst. 4. Previous cholecystectomy. 5. Sigmoid diverticulosis without evidence for diverticulitis.  Radiation dose reduction techniques were utilized, including automated exposure control and exposure modulation based on body size.  This report was finalized on 2/4/2023 4:13 PM by Dr. Torey Bedoya M.D.      Impression:  Melena  Acute on chronic anemia  Abdominal pain  Elevated ALP (Intermittent since June " 2021)  Elevated LFTs  S/p CCY 11/2022  Myeloproliferative Disorder with chronic elevation of WBC and anemia on Jakafi (JAVIER inhibitor)  CAD on Plavix (stents 2/2022)  Afib on Eliquis  Candida Esophagitis    Plan:  - MR abd w MRCP on 1/25 showed minimal intrahepatic biliary prominence, CBD normal caliber and distal tapering without intraluminal filling defects, cystic duct remnant unremarkable, no PD dilatation, 5 mm lesion in body of pancreas possibly representing IPMN, some periportal enhancement.    - EGD 1/25 with sloughing of mucosa in esophagus (bx: candida), 3 cm HH, mild gastritis (bx no H pylori), normal duodenum (bx: normal).  - Fluconazole has too many interactions with her medications, recommend 14 days nystatin swish and swallow  - CT abd/pelvis w contrast this admission with no acute findings.    - IV PPI   - Monitor H/H, transfuse for Hb<7  - Baseline Hb around 8 since November   - She had recent EGD as noted above without ulcers or lesions high risk for bleeding, may have oozed from biopsy sites as she reports she has had black loose stool since discharge last month  - Hold AC and Plavix (would recommend evaluating continued need for Plavix given stents were 1 year ago in 2/2022)   - CLD, NPO at midnight  - Mesenteric duplex in AM   - Pending clinical course, may consider colonoscopy +/- EGD/push enteroscopy when she is off Plavix for 5 days.  She ideally does not want to have colonoscopy but is amenable if absolutely needed  - Continue to monitor CMP      Charles Diaz MD       Electronically signed by Charles Diaz MD at 02/04/23 5580

## 2023-02-05 NOTE — PLAN OF CARE
Goal Outcome Evaluation:  Plan of Care Reviewed With: patient   Outcome Evaluation: VSS on 2 L NC. pt alert and oriented X4.  Pt turns self in bed. Standby assist to bathroom. BM X1 tonight. monitoring H and H Q8 Hrs. pt complained of abdomen pain this morning, Dilaudid X1 given. pt has hematology/oncology and cardiology consulted for today.      Problem: Adult Inpatient Plan of Care  Goal: Absence of Hospital-Acquired Illness or Injury  Intervention: Identify and Manage Fall Risk  Recent Flowsheet Documentation  Taken 2/5/2023 0424 by Alina Quintero RN  Safety Promotion/Fall Prevention: safety round/check completed  Taken 2/5/2023 0213 by Alina Quintero RN  Safety Promotion/Fall Prevention:   activity supervised   assistive device/personal items within reach   clutter free environment maintained   safety round/check completed  Taken 2/5/2023 0010 by Alina Quintero RN  Safety Promotion/Fall Prevention:   activity supervised   assistive device/personal items within reach   clutter free environment maintained   safety round/check completed  Taken 2/4/2023 2228 by Alina Quintero RN  Safety Promotion/Fall Prevention:   activity supervised   assistive device/personal items within reach   clutter free environment maintained   safety round/check completed  Taken 2/4/2023 2116 by Alina Quintero RN  Safety Promotion/Fall Prevention:   activity supervised   assistive device/personal items within reach   clutter free environment maintained   safety round/check completed  Taken 2/4/2023 1945 by Alina Quintero RN  Safety Promotion/Fall Prevention:   activity supervised   assistive device/personal items within reach   clutter free environment maintained   safety round/check completed

## 2023-02-06 LAB
ANION GAP SERPL CALCULATED.3IONS-SCNC: 10.1 MMOL/L (ref 5–15)
ANISOCYTOSIS BLD QL: ABNORMAL
BASOPHILS # BLD MANUAL: 0.47 10*3/MM3 (ref 0–0.2)
BASOPHILS NFR BLD MANUAL: 1.1 % (ref 0–1.5)
BH BB BLOOD EXPIRATION DATE: NORMAL
BH BB BLOOD TYPE BARCODE: 5100
BH BB DISPENSE STATUS: NORMAL
BH BB PRODUCT CODE: NORMAL
BH BB UNIT NUMBER: NORMAL
BH CV VAS SMA AORTA EDV: 26.9 CM/S
BH CV VAS SMA AORTA PSV: 126 CM/S
BH CV VAS SMA CELIAC DIST EDV: 44.6 CM/S
BH CV VAS SMA CELIAC DIST PSV: 191 CM/S
BH CV VAS SMA CELIAC ORIGIN EDV: 36.9 CM/S
BH CV VAS SMA CELIAC ORIGIN PSV: 160 CM/S
BH CV VAS SMA CELIAC PROX EDV: 39.8 CM/S
BH CV VAS SMA CELIAC PROX PSV: 170 CM/S
BH CV VAS SMA HEPATIC EDV: 38.2 CM/S
BH CV VAS SMA HEPATIC PSV: 226 CM/S
BH CV VAS SMA IMA PSV: 386 CM/S
BH CV VAS SMA ORIGIN EDV: 0 CM/S
BH CV VAS SMA ORIGIN PSV: 138 CM/S
BH CV VAS SMA SMA DIST EDV: 0 CM/S
BH CV VAS SMA SMA DIST PSV: 208 CM/S
BH CV VAS SMA SMA MID EDV: 0 CM/S
BH CV VAS SMA SMA MID PSV: 122 CM/S
BH CV VAS SMA SMA PROX EDV: 0 CM/S
BH CV VAS SMA SMA PROX PSV: 235 CM/S
BH CV VAS SMA SPLENIC EDV: 44 CM/S
BH CV VAS SMA SPLENIC PSV: 185 CM/S
BUN SERPL-MCNC: 10 MG/DL (ref 8–23)
BUN/CREAT SERPL: 15.2 (ref 7–25)
CALCIUM SPEC-SCNC: 7.8 MG/DL (ref 8.6–10.5)
CHLORIDE SERPL-SCNC: 99 MMOL/L (ref 98–107)
CO2 SERPL-SCNC: 26.9 MMOL/L (ref 22–29)
CREAT SERPL-MCNC: 0.66 MG/DL (ref 0.57–1)
CROSSMATCH INTERPRETATION: NORMAL
DEPRECATED RDW RBC AUTO: 51.4 FL (ref 37–54)
EGFRCR SERPLBLD CKD-EPI 2021: 86.6 ML/MIN/1.73
EOSINOPHIL # BLD MANUAL: 0.95 10*3/MM3 (ref 0–0.4)
EOSINOPHIL NFR BLD MANUAL: 2.2 % (ref 0.3–6.2)
ERYTHROCYTE [DISTWIDTH] IN BLOOD BY AUTOMATED COUNT: 15.1 % (ref 12.3–15.4)
GLUCOSE BLDC GLUCOMTR-MCNC: 106 MG/DL (ref 70–130)
GLUCOSE BLDC GLUCOMTR-MCNC: 111 MG/DL (ref 70–130)
GLUCOSE BLDC GLUCOMTR-MCNC: 112 MG/DL (ref 70–130)
GLUCOSE BLDC GLUCOMTR-MCNC: 73 MG/DL (ref 70–130)
GLUCOSE BLDC GLUCOMTR-MCNC: 96 MG/DL (ref 70–130)
GLUCOSE SERPL-MCNC: 59 MG/DL (ref 65–99)
HCT VFR BLD AUTO: 25.9 % (ref 34–46.6)
HCT VFR BLD AUTO: 28.3 % (ref 34–46.6)
HGB BLD-MCNC: 8.6 G/DL (ref 12–15.9)
HGB BLD-MCNC: 9.4 G/DL (ref 12–15.9)
HYPOCHROMIA BLD QL: ABNORMAL
LYMPHOCYTES # BLD MANUAL: 2.33 10*3/MM3 (ref 0.7–3.1)
LYMPHOCYTES NFR BLD MANUAL: 1.1 % (ref 5–12)
MAXIMAL PREDICTED HEART RATE: 136 BPM
MCH RBC QN AUTO: 31.9 PG (ref 26.6–33)
MCHC RBC AUTO-ENTMCNC: 33.2 G/DL (ref 31.5–35.7)
MCV RBC AUTO: 95.9 FL (ref 79–97)
MONOCYTES # BLD: 0.47 10*3/MM3 (ref 0.1–0.9)
NEUTROPHILS # BLD AUTO: 38.93 10*3/MM3 (ref 1.7–7)
NEUTROPHILS NFR BLD MANUAL: 90.3 % (ref 42.7–76)
PLAT MORPH BLD: NORMAL
PLATELET # BLD AUTO: 305 10*3/MM3 (ref 140–450)
PMV BLD AUTO: 11 FL (ref 6–12)
POTASSIUM SERPL-SCNC: 2.8 MMOL/L (ref 3.5–5.2)
QT INTERVAL: 377 MS
RBC # BLD AUTO: 2.7 10*6/MM3 (ref 3.77–5.28)
SODIUM SERPL-SCNC: 136 MMOL/L (ref 136–145)
STRESS TARGET HR: 116 BPM
UNIT  ABO: NORMAL
UNIT  RH: NORMAL
VARIANT LYMPHS NFR BLD MANUAL: 5.4 % (ref 19.6–45.3)
WBC MORPH BLD: NORMAL
WBC NRBC COR # BLD: 43.11 10*3/MM3 (ref 3.4–10.8)

## 2023-02-06 PROCEDURE — 85018 HEMOGLOBIN: CPT | Performed by: NURSE PRACTITIONER

## 2023-02-06 PROCEDURE — 99232 SBSQ HOSP IP/OBS MODERATE 35: CPT | Performed by: NURSE PRACTITIONER

## 2023-02-06 PROCEDURE — 85007 BL SMEAR W/DIFF WBC COUNT: CPT | Performed by: INTERNAL MEDICINE

## 2023-02-06 PROCEDURE — 97165 OT EVAL LOW COMPLEX 30 MIN: CPT

## 2023-02-06 PROCEDURE — 80048 BASIC METABOLIC PNL TOTAL CA: CPT | Performed by: INTERNAL MEDICINE

## 2023-02-06 PROCEDURE — 99232 SBSQ HOSP IP/OBS MODERATE 35: CPT | Performed by: INTERNAL MEDICINE

## 2023-02-06 PROCEDURE — 97116 GAIT TRAINING THERAPY: CPT

## 2023-02-06 PROCEDURE — 99231 SBSQ HOSP IP/OBS SF/LOW 25: CPT | Performed by: INTERNAL MEDICINE

## 2023-02-06 PROCEDURE — 85025 COMPLETE CBC W/AUTO DIFF WBC: CPT | Performed by: INTERNAL MEDICINE

## 2023-02-06 PROCEDURE — 82962 GLUCOSE BLOOD TEST: CPT

## 2023-02-06 PROCEDURE — 97161 PT EVAL LOW COMPLEX 20 MIN: CPT

## 2023-02-06 PROCEDURE — 97535 SELF CARE MNGMENT TRAINING: CPT

## 2023-02-06 PROCEDURE — 85014 HEMATOCRIT: CPT | Performed by: NURSE PRACTITIONER

## 2023-02-06 RX ORDER — POTASSIUM CHLORIDE 1.5 G/1.77G
40 POWDER, FOR SOLUTION ORAL AS NEEDED
Status: DISCONTINUED | OUTPATIENT
Start: 2023-02-06 | End: 2023-02-12 | Stop reason: HOSPADM

## 2023-02-06 RX ORDER — METOPROLOL SUCCINATE 25 MG/1
25 TABLET, EXTENDED RELEASE ORAL EVERY 12 HOURS SCHEDULED
Status: DISCONTINUED | OUTPATIENT
Start: 2023-02-06 | End: 2023-02-12 | Stop reason: HOSPADM

## 2023-02-06 RX ORDER — POTASSIUM CHLORIDE 7.45 MG/ML
10 INJECTION INTRAVENOUS
Status: DISCONTINUED | OUTPATIENT
Start: 2023-02-06 | End: 2023-02-12 | Stop reason: HOSPADM

## 2023-02-06 RX ORDER — POTASSIUM CHLORIDE 750 MG/1
40 TABLET, FILM COATED, EXTENDED RELEASE ORAL AS NEEDED
Status: DISCONTINUED | OUTPATIENT
Start: 2023-02-06 | End: 2023-02-12 | Stop reason: HOSPADM

## 2023-02-06 RX ADMIN — PANTOPRAZOLE SODIUM 8 MG/HR: 40 INJECTION, POWDER, FOR SOLUTION INTRAVENOUS at 06:54

## 2023-02-06 RX ADMIN — FUROSEMIDE 40 MG: 40 TABLET ORAL at 09:59

## 2023-02-06 RX ADMIN — PANTOPRAZOLE SODIUM 8 MG/HR: 40 INJECTION, POWDER, FOR SOLUTION INTRAVENOUS at 01:57

## 2023-02-06 RX ADMIN — Medication 3 MG: at 23:50

## 2023-02-06 RX ADMIN — ACETAMINOPHEN 650 MG: 325 TABLET, FILM COATED ORAL at 20:09

## 2023-02-06 RX ADMIN — NYSTATIN 500000 UNITS: 100000 SUSPENSION ORAL at 14:37

## 2023-02-06 RX ADMIN — ATORVASTATIN CALCIUM 80 MG: 80 TABLET, FILM COATED ORAL at 20:09

## 2023-02-06 RX ADMIN — PANTOPRAZOLE SODIUM 8 MG/HR: 40 INJECTION, POWDER, FOR SOLUTION INTRAVENOUS at 12:24

## 2023-02-06 RX ADMIN — Medication 10 ML: at 20:10

## 2023-02-06 RX ADMIN — PANTOPRAZOLE SODIUM 8 MG/HR: 40 INJECTION, POWDER, FOR SOLUTION INTRAVENOUS at 18:35

## 2023-02-06 RX ADMIN — NYSTATIN 500000 UNITS: 100000 SUSPENSION ORAL at 18:30

## 2023-02-06 RX ADMIN — LATANOPROST 1 DROP: 50 SOLUTION OPHTHALMIC at 20:09

## 2023-02-06 RX ADMIN — METOPROLOL SUCCINATE 25 MG: 25 TABLET, EXTENDED RELEASE ORAL at 20:09

## 2023-02-06 RX ADMIN — POTASSIUM CHLORIDE 40 MEQ: 750 TABLET, EXTENDED RELEASE ORAL at 05:39

## 2023-02-06 RX ADMIN — NYSTATIN 500000 UNITS: 100000 SUSPENSION ORAL at 09:59

## 2023-02-06 RX ADMIN — EMPAGLIFLOZIN 10 MG: 10 TABLET, FILM COATED ORAL at 09:59

## 2023-02-06 RX ADMIN — NYSTATIN 500000 UNITS: 100000 SUSPENSION ORAL at 23:50

## 2023-02-06 NOTE — DISCHARGE PLACEMENT REQUEST
"Maribell Boyer (82 y.o. Female)     Date of Birth   1940    Social Security Number       Address   [address] [place] [address] [address] [place] [address]    Home Phone   [phone]    MRN   [id number]       Gnosticism   Unknown    Marital Status                               Admission Date   2/4/23    Admission Type   Emergency    Admitting Provider   Jaimee Macias MD    Attending Provider   Anthony Muñoz MD    Department, Room/Bed   [address], S[address]/1       Discharge Date       Discharge Disposition       Discharge Destination                               Attending Provider: Anthony Muñoz MD    Allergies: Aspirin, Oxycodone, Diphenhydramine Hcl    Isolation: None   Infection: ESBL E coli (01/25/23)   Code Status: CPR    Ht: 154.9 cm (61\")   Wt: 66.7 kg (147 lb)    Admission Cmt: None   Principal Problem: ABLA (acute blood loss anemia) [D62]                 Active Insurance as of 2/4/2023     Primary Coverage     Payor Plan Insurance Group Employer/Plan Group    AETNA MEDICARE REPLACEMENT AETNA MEDICARE REPLACEMENT [id number]     Payor Plan Address Payor Plan Phone Number Payor Plan Fax Number Effective Dates    [address] [phone]  1/1/2022 - None Entered    [facility] [id number]       Subscriber Name Subscriber Birth Date Member ID       MARIBELL BOYER 1940 [id number]                 Emergency Contacts      (Rel.) Home Phone Work Phone Mobile Phone    RAY BOYER (Daughter) [phone] -- [phone]    Karen Gutierrez (Daughter) -- -- [phone]    MerlinGreg (Son) -- -- [phone]    Meg Boyer (Brother) -- -- [phone]    MEG BANKS (Brother) -- -- [phone]              "

## 2023-02-06 NOTE — THERAPY DISCHARGE NOTE
Acute Care - Occupational Therapy Discharge  [facility]    Patient Name: Catherine Boyer  : 1940    MRN: [id number]                              Today's Date: 2023       Admit Date: 2023    Visit Dx:     ICD-10-CM ICD-9-CM   1. ABLA (acute blood loss anemia)  D62 285.1   2. Gastrointestinal hemorrhage, unspecified gastrointestinal hemorrhage type  K92.2 578.9   3. Chronic anticoagulation  Z79.01 V58.61     Patient Active Problem List   Diagnosis   • CAD (coronary artery disease)   • Nonbacterial thrombotic endocarditis   • Paroxysmal atrial fibrillation (HCC)   • Myeloproliferative disorder (HCC)   • Microcytic anemia   • Type 2 diabetes mellitus with circulatory disorder, without long-term current use of insulin (HCC)   • GERD (gastroesophageal reflux disease)   • Hypertension   • Personal history of transient ischemic attack (TIA), 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 deficits   • COVID   • Porcelain gallbladder   • Breast cancer (HCC)   • Chronic diastolic (congestive) heart failure (HCC)   • Cholecystitis   • Urinary tract infection without hematuria, site unspecified   • Epigastric abdominal pain   • Gastritis   • ABLA (acute blood loss anemia)   • Atherosclerosis of abdominal aorta (HCC)     Past Medical History:   Diagnosis Date   • Atherosclerosis of abdominal aorta (HCC) 2023   • Atrial fibrillation (HCC)    • Breast cancer (HCC)    • CAD (coronary artery disease)     NSTEMI 2022: 90% ostial LAD, 99% D1, 70% mid-distal LAD (medical therapy). She received two stents (2.5x18 and 2.5x26mm [place] LEFTY) but I don't know which one went to which lesion.   • Carotid atherosclerosis    • Chronic diastolic (congestive) heart failure (HCC)    • GERD (gastroesophageal reflux disease)    • Glaucoma    • History of cataract    • Hypertension    • Microcytic anemia     per Dr. oleg pate office  note 22-dd   • Myeloproliferative disorder (HCC)     JAK2 positive   • Nonbacterial thrombotic  endocarditis     6/2021: 4x5mm vegetation on the ventricular surface of the anterior MV, negative blood cultures   • Porcelain gallbladder    • PUD (peptic ulcer disease)    • TIA (transient ischemic attack)    • Type 2 diabetes mellitus (HCC)    • Type 2 diabetes mellitus with circulatory disorder, without long-term current use of insulin (HCC) 7/14/2022   • Upper GI bleed      Past Surgical History:   Procedure Laterality Date   • BREAST LUMPECTOMY  1999   • CARDIAC CATHETERIZATION N/A 09/02/2022    Procedure: Coronary angiography;  Surgeon: Guero Verde MD;  Location: [facility] CATH INVASIVE LOCATION;  Service: Cardiovascular;  Laterality: N/A;   • CARDIAC CATHETERIZATION N/A 09/02/2022    Procedure: Stent LEFTY coronary;  Surgeon: Guero Verde MD;  Location: [facility] CATH INVASIVE LOCATION;  Service: Cardiovascular;  Laterality: N/A;   • CATARACT EXTRACTION  2011   • CHOLECYSTECTOMY WITH INTRAOPERATIVE CHOLANGIOGRAM N/A 11/28/2022    Procedure: CHOLECYSTECTOMY LAPAROSCOPIC INTRAOPERATIVE CHOLANGIOGRAM;  Surgeon: Dani Grover Jr., MD;  Location: [facility] MAIN OR;  Service: General;  Laterality: N/A;   • ENDOSCOPY N/A 1/25/2023    Procedure: ESOPHAGOGASTRODUODENOSCOPY WITH BIOPSIES;  Surgeon: Charles Diaz MD;  Location: [facility] ENDOSCOPY;  Service: Gastroenterology;  Laterality: N/A;  pre: abd pain, nausea  post: hiatal hernia, mild gastritis, sloughing of the esophagus   • JOINT REPLACEMENT        General Information     Row Name 02/06/23 1404          OT Time and Intention    Document Type discharge evaluation/summary  -RB     Mode of Treatment co-treatment;occupational therapy;physical therapy  -RB     Row Name 02/06/23 1405          General Information    Patient Profile Reviewed yes  -RB     Prior Level of Function independent:;ADL's;transfer  -RB     Existing Precautions/Restrictions fall;oxygen therapy device and L/min  -RB     Barriers to Rehab none identified  -RB     Row Name 02/06/23 2992           Living Environment    People in Home facility resident  -RB     Row Name 02/06/23 1404          Home Main Entrance    Number of Stairs, Main Entrance none  -RB     Row Name 02/06/23 1404          Cognition    Orientation Status (Cognition) oriented x 4  -RB     Row Name 02/06/23 1404          Safety Issues, Functional Mobility    Impairments Affecting Function (Mobility) endurance/activity tolerance  -RB           User Key  (r) = Recorded By, (t) = Taken By, (c) = Cosigned By    Initials Name Provider Type    RB Tasneem Villafana OT Occupational Therapist               Mobility/ADL's     Row Name 02/06/23 1404          Bed Mobility    Bed Mobility supine-sit  -RB     Supine-Sit [place] (Bed Mobility) standby assist  -RB     Assistive Device (Bed Mobility) bed rails  -RB     Row Name 02/06/23 1404          Transfers    Transfers sit-stand transfer;stand-sit transfer;toilet transfer;bed-chair transfer  -RB     Row Name 02/06/23 1404          Bed-Chair Transfer    Bed-Chair [place] (Transfers) standby assist  -RB     Row Name 02/06/23 1404          Sit-Stand Transfer    Sit-Stand [place] (Transfers) standby assist  -RB     Row Name 02/06/23 1404          Stand-Sit Transfer    Stand-Sit [place] (Transfers) standby assist  -RB     Row Name 02/06/23 1404          Toilet Transfer    Type (Toilet Transfer) stand-sit;sit-stand  -RB     [place] Level (Toilet Transfer) standby assist  -RB     Assistive Device (Toilet Transfer) grab bars/safety frame  -RB     Row Name 02/06/23 1404          Functional Mobility    Functional Mobility- Ind. Level verbal cues required;standby assist  -RB     Row Name 02/06/23 1404          Activities of Daily Living    BADL Assessment/Intervention toileting;grooming  -RB     Row Name 02/06/23 1404          Toileting Assessment/Training    [place] Level (Toileting) toileting skills;standby assist  -RB     Position (Toileting) supported sitting;supported standing   -RB     Row Name 02/06/23 1404          Grooming Assessment/Training    [place] Level (Grooming) grooming skills;standby assist  -RB     Position (Grooming) sink side;supported standing  -RB           User Key  (r) = Recorded By, (t) = Taken By, (c) = Cosigned By    Initials Name Provider Type    Tasneem Carey OT Occupational Therapist               Obj/Interventions     Row Name 02/06/23 1405          Sensory Assessment (Somatosensory)    Sensory Assessment (Somatosensory) sensation intact  -RB     Row Name 02/06/23 1405          Vision Assessment/Intervention    Visual Impairment/Limitations distance vision impaired;WFL  -RB     [facility] Name 02/06/23 1405          Range of Motion Comprehensive    General Range of Motion no range of motion deficits identified  -RB     [facility] Name 02/06/23 1405          Strength Comprehensive (MMT)    General Manual Muscle Testing (MMT) Assessment no strength deficits identified  -[facility] Name 02/06/23 1405          Balance    Comment, Balance HHA initially with standing. No LOB.  -RB           User Key  (r) = Recorded By, (t) = Taken By, (c) = Cosigned By    Initials Name Provider Type    Tasneem Carey OT Occupational Therapist               Goals/Plan     [facility] Name 02/06/23 1406          Transfer Goal 1 (OT)    Activity/Assistive Device (Transfer Goal 1, OT) transfers, all  -RB     [place] Level/Cues Needed (Transfer Goal 1, OT) standby assist  -RB     Time Frame (Transfer Goal 1, OT) short term goal (STG);2 weeks  -RB     Progress/Outcome (Transfer Goal 1, OT) goal met  -RB     [facility] Name 02/06/23 1406          Grooming Goal 1 (OT)    Activity/Device (Grooming Goal 1, OT) grooming skills, all  -RB     [place] (Grooming Goal 1, OT) standby assist  -RB     Time Frame (Grooming Goal 1, OT) short term goal (STG);2 weeks  -RB     Progress/Outcome (Grooming Goal 1, OT) goal met  -RB           User Key  (r) = Recorded By, (t) = Taken By, (c) = Cosigned By     Initials Name Provider Type    Tasneem Carey OT Occupational Therapist               Clinical Impression     Row Name 02/06/23 1406          Pain Assessment    Pretreatment Pain Rating 0/10 - no pain  -RB     Posttreatment Pain Rating 0/10 - no pain  -RB     Row Name 02/06/23 1406          Plan of Care Review    Plan of Care Reviewed With patient  -RB     Progress no change  -RB     Row Name 02/06/23 1406          Therapy Assessment/Plan (OT)    Rehab Potential (OT) good, to achieve stated therapy goals  -RB     Criteria for Skilled Therapeutic Interventions Met (OT) no;no problems identified which require skilled intervention  -RB     Therapy Frequency (OT) evaluation only  -RB     Row Name 02/06/23 1406          Therapy Plan Review/Discharge Plan (OT)    Anticipated Discharge Disposition (OT) assisted living  -RB     Row Name 02/06/23 1406          Vital Signs    O2 Delivery Pre Treatment supplemental O2  -RB     O2 Delivery Intra Treatment supplemental O2  -RB     O2 Delivery Post Treatment supplemental O2  -RB     Pre Patient Position Supine  -RB     Intra Patient Position Standing  -RB     Post Patient Position Sitting  -RB     Row Name 02/06/23 1406          Positioning and Restraints    Pre-Treatment Position in bed  -RB     Post Treatment Position chair  -RB     In Chair notified nsg;reclined;sitting;call light within reach;exit alarm on;encouraged to call for assist  -RB           User Key  (r) = Recorded By, (t) = Taken By, (c) = Cosigned By    Initials Name Provider Type    Tasneem Carey OT Occupational Therapist               Outcome Measures     Row Name 02/06/23 1406          How much help from another is currently needed...    Putting on and taking off regular lower body clothing? 4  -RB     Bathing (including washing, rinsing, and drying) 4  -RB     Toileting (which includes using toilet bed pan or urinal) 4  -RB     Putting on and taking off regular upper body clothing 4  -RB      Taking care of personal grooming (such as brushing teeth) 4  -     Eating meals 4  -     AM-PAC 6 Clicks Score (OT) 24  -     Row Name 02/06/23 1324 02/06/23 1003       How much help from another person do you currently need...    Turning from your back to your side while in flat bed without using bedrails? 4  -BH 3  -MM    Moving from lying on back to sitting on the side of a flat bed without bedrails? 4  -BH 3  -MM    Moving to and from a bed to a chair (including a wheelchair)? 3  -BH 3  -MM    Standing up from a chair using your arms (e.g., wheelchair, bedside chair)? 4  -BH 3  -MM    Climbing 3-5 steps with a railing? 3  -BH 2  -MM    To walk in hospital room? 3  -BH 3  -MM    AM-PAC 6 Clicks Score (PT) 21  - 17  -MM    Highest level of mobility 6 --> Walked 10 steps or more  - 5 --> Static standing  -MM    Row Name 02/06/23 1406          Modified Crisp Scale    Modified Crisp Scale 1 - No significant disability despite symptoms.  Able to carry out all usual duties and activities.  -     Row Name 02/06/23 1406 02/06/23 1324       Functional Assessment    Outcome Measure Options AM-PAC 6 Clicks Daily Activity (OT);Modified Rickie  - AM-PAC 6 Clicks Basic Mobility (PT)  -          User Key  (r) = Recorded By, (t) = Taken By, (c) = Cosigned By    Initials Name Provider Type    Orion Myers, RN Registered Nurse    Tasneem Carey, OT Occupational Therapist    Janna Bourgeois, PT Physical Therapist              Occupational Therapy Education     Title: PT OT SLP Therapies (In Progress)     Topic: Occupational Therapy (Not Started)     Point: ADL training (Not Started)     Description:   Instruct learner(s) on proper safety adaptation and remediation techniques during self care or transfers.   Instruct in proper use of assistive devices.              Learner Progress:  Not documented in this visit.          Point: Home exercise program (Not Started)     Description:   Instruct learner(s)  on appropriate technique for monitoring, assisting and/or progressing therapeutic exercises/activities.              Learner Progress:  Not documented in this visit.          Point: Precautions (Not Started)     Description:   Instruct learner(s) on prescribed precautions during self-care and functional transfers.              Learner Progress:  Not documented in this visit.          Point: Body mechanics (Not Started)     Description:   Instruct learner(s) on proper positioning and spine alignment during self-care, functional mobility activities and/or exercises.              Learner Progress:  Not documented in this visit.                          OT Recommendation and Plan  Therapy Frequency (OT): evaluation only  Plan of Care Review  Plan of Care Reviewed With: patient  Progress: no change  Plan of Care Reviewed With: patient     Time Calculation:    Time Calculation- OT     Row Name 02/06/23 1404 02/06/23 1325          Time Calculation- OT    OT Start Time 0900  -RB --     OT Stop Time 0920  -RB --     OT Time Calculation (min) 20 min  -RB --     Total Timed Code Minutes- OT 10 minute(s)  -RB --     OT Received On 02/06/23  -RB --        Timed Charges    32326 - Gait Training Minutes  -- 10  -BH     80508 - OT Self Care/Mgmt Minutes 10  -RB --        Untimed Charges    OT Eval/Re-eval Minutes 10  -RB --        Total Minutes    Timed Charges Total Minutes 10  -RB 10  -BH     Untimed Charges Total Minutes 10  -RB --      Total Minutes 20  -RB 10  -BH           User Key  (r) = Recorded By, (t) = Taken By, (c) = Cosigned By    Initials Name Provider Type    RB Tasneem Villfaana OT Occupational Therapist     Janna Reinoso, PT Physical Therapist              Therapy Charges for Today     Code Description Service Date Service Provider Modifiers Qty    [id number]  OT EVAL LOW COMPLEXITY 2 2/6/2023 Tasneem Villafana OT GO 1    [id number]  OT SELF CARE/MGMT/TRAIN EA 15 MIN 2/6/2023 Tasneem Villafana OT GO 1              OT Discharge Summary  Anticipated Discharge Disposition (OT): assisted living    Tasneem Villafana OT  2/6/2023

## 2023-02-06 NOTE — PROGRESS NOTES
"    Patient Name: Catherine Boyer  :1940  82 y.o.      Patient Care Team:  Kristi Moreau APRN as PCP - General (Nurse Practitioner)  Gerard Foreman MD as Referring Physician (Medical Oncology)  Darrell Reinoso MD as Consulting Physician (Hematology and Oncology)  Albin Barriga MD as Consulting Physician (Cardiology)  Richard Aldana, RN as     Chief Complaint: follow up atrial fibrillation, GIB, coronary artery disease    Interval History:    she is resting in bed eating lunch. Feels a little short of breath when she moves around but recovers quickly. No CP.      Objective   Vital Signs  Temp:  [97 °F (36.1 °C)-98.8 °F (37.1 °C)] 97 °F (36.1 °C)  Heart Rate:  [] 116  Resp:  [16-18] 18  BP: (115-136)/(61-77) 115/61    Intake/Output Summary (Last 24 hours) at 2023 1217  Last data filed at 2023 0800  Gross per 24 hour   Intake 920 ml   Output --   Net 920 ml     Flowsheet Rows    Flowsheet Row First Filed Value   Admission Height 154.9 cm (61\") Documented at 2023 1422   Admission Weight 66.7 kg (147 lb) Documented at 2023 1422          Physical Exam:   General Appearance:    Alert, cooperative, in no acute distress   Lungs:     Clear to auscultation.  Normal respiratory effort and rate.      Heart:    Regular rhythm and normal rate, normal S1 and S2, no murmurs, gallops or rubs.     Chest Wall:    No abnormalities observed   Abdomen:     Soft, nontender, positive bowel sounds.     Extremities:   no cyanosis, clubbing or edema.  No marked joint deformities.  Adequate musculoskeletal strength.       Results Review:    Results from last 7 days   Lab Units 23  0323   SODIUM mmol/L 136   POTASSIUM mmol/L 2.8*   CHLORIDE mmol/L 99   CO2 mmol/L 26.9   BUN mg/dL 10   CREATININE mg/dL 0.66   GLUCOSE mg/dL 59*   CALCIUM mg/dL 7.8*         Results from last 7 days   Lab Units 23  0323   WBC 10*3/mm3 43.11*   HEMOGLOBIN g/dL 8.6*   HEMATOCRIT % 25.9*   PLATELETS " 10*3/mm3 305     Results from last 7 days   Lab Units 02/04/23  1419   INR  1.16*   APTT seconds 41.2*     Results from last 7 days   Lab Units 02/05/23  0356   MAGNESIUM mg/dL 2.0                   Medication Review:   atorvastatin, 80 mg, Oral, Nightly  empagliflozin, 10 mg, Oral, Daily  furosemide, 40 mg, Oral, Daily  insulin lispro, 0-7 Units, Subcutaneous, TID AC  latanoprost, 1 drop, Both Eyes, Nightly  nystatin, 5 mL, Oral, 4x Daily         pantoprazole, 8 mg/hr, Last Rate: 8 mg/hr (02/06/23 0654)        Assessment & Plan   1. Acute blood loss anemia , GI following. Apixaban and clopidogrel held currently. Plan for scopes after 5 days off clopidogrel.    2. Coronary artery disease status post stent placement to the diagonal in February 2022 then restenosis in September 2022 requiring repeat stenting. She has class I indication for antiplatelet therapy if safe moving forward from a GI standpoint.    3. Paroxysmal atrial fibrillation, now in AF. HR mildly tachy. Resume home beta blocker. Apixaban on hold. CHADs 2 Vasc score of 9, thus high risk of CVA off anticoagulation.     4. Hypertension    5. Chronic HFpEF - continue oral lasix. Watch for volume overload.    6. Nonbacterial thrombotic endocarditis, diagnosed June 2021 and stable.    ABEL Carbajal  [facility]  02/06/23  12:17 EST

## 2023-02-06 NOTE — PROGRESS NOTES
Name: Catherine Boyer ADMIT: 2023   : 1940  PCP: Kristi Moreau APRN    MRN: [id number] LOS: 2 days   AGE/SEX: 82 y.o. female  ROOM: [facility]/     Subjective   Subjective   No complaints this morning.  This is the first day she has been able to eat without any lingering abdominal pain.    Objective   Objective   Vital Signs  Temp:  [97 °F (36.1 °C)-98.8 °F (37.1 °C)] 97 °F (36.1 °C)  Heart Rate:  [] 102  Resp:  [16-18] 18  BP: (115-136)/(61-77) 115/61  SpO2:  [93 %-97 %] 96 %  on  Flow (L/min):  [2] 2;   Device (Oxygen Therapy): nasal cannula  Body mass index is 27.78 kg/m².  Physical Exam  Constitutional:       General: She is not in acute distress.     Appearance: Normal appearance.   Cardiovascular:      Rate and Rhythm: Normal rate and regular rhythm.      Pulses: Normal pulses.      Heart sounds: No murmur heard.    No gallop.   Pulmonary:      Effort: Pulmonary effort is normal. No respiratory distress.      Breath sounds: Normal breath sounds.   Abdominal:      General: Abdomen is flat. Bowel sounds are normal. There is no distension.      Palpations: Abdomen is soft.      Tenderness: There is no abdominal tenderness. There is no guarding.   Musculoskeletal:      Right lower leg: No edema.      Left lower leg: No edema.   Skin:     General: Skin is warm.   Neurological:      General: No focal deficit present.      Mental Status: She is alert and oriented to person, place, and time.   Psychiatric:         Mood and Affect: Mood normal.         Results Review     I reviewed the patient's new clinical results.  Results from last 7 days   Lab Units 23  0323 23  2334 23  1610 23  0806 23  0356 23  2335 23  1419   WBC 10*3/mm3 43.11*  --   --   --  49.85*  --  56.36*   HEMOGLOBIN g/dL 8.6* 8.5* 8.9* 6.8* 7.1*   < > 6.1*   PLATELETS 10*3/mm3 305  --   --   --  334  --  385    < > = values in this interval not displayed.     Results from last 7 days   Lab  Units 02/06/23  0323 02/05/23  0356 02/04/23  1419   SODIUM mmol/L 136 136 136   POTASSIUM mmol/L 2.8* 3.6 4.3   CHLORIDE mmol/L 99 104 100   CO2 mmol/L 26.9 24.1 26.0   BUN mg/dL 10 11 13   CREATININE mg/dL 0.66 0.71 0.76   GLUCOSE mg/dL 59* 75 119*   EGFR mL/min/1.73 86.6 84.0 77.4     Results from last 7 days   Lab Units 02/05/23  0356 02/04/23  1419   ALBUMIN g/dL 3.7 4.1   BILIRUBIN mg/dL 0.9 0.6   ALK PHOS U/L 171* 194*   AST (SGOT) U/L 23 35*   ALT (SGPT) U/L 41* 52*     Results from last 7 days   Lab Units 02/06/23  0323 02/05/23  0356 02/04/23  1419   CALCIUM mg/dL 7.8* 8.2* 8.6   ALBUMIN g/dL  --  3.7 4.1   MAGNESIUM mg/dL  --  2.0  --        Glucose   Date/Time Value Ref Range Status   02/06/2023 0542 96 70 - 130 mg/dL Final     Comment:     Meter: [id number] : [id number] Emery Tia NA   02/06/2023 0503 73 70 - 130 mg/dL Final     Comment:     Meter: [id number] : [id number] Emery Tia NA   02/05/2023 2136 100 70 - 130 mg/dL Final     Comment:     Meter: [id number] : [id number] Emery Tia NA   02/05/2023 1543 87 70 - 130 mg/dL Final     Comment:     Meter: [id number] : [id number] Alston Whitney PACER   02/05/2023 1036 102 70 - 130 mg/dL Final     Comment:     Meter: [id number] : [id number] Alston Whitney PACER   02/05/2023 0610 109 70 - 130 mg/dL Final     Comment:     Meter: [id number] : [id number] Emery Tia NA       CT Abdomen Pelvis With Contrast    Result Date: 2/4/2023  1. No evidence for acute abnormality of the abdomen or pelvis. 2. Small hiatal hernia. 3. 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 Suspect small left renal cyst. 4. Previous cholecystectomy. 5. Sigmoid diverticulosis without evidence for diverticulitis.  Radiation dose reduction techniques were utilized, including automated exposure control and exposure modulation based on body size.  This report was finalized on 2/4/2023 4:13 PM by Dr. Torey Bedoya M.D.      Scheduled Medications  atorvastatin, 80 mg, Oral,  Nightly  empagliflozin, 10 mg, Oral, Daily  furosemide, 40 mg, Oral, Daily  insulin lispro, 0-7 Units, Subcutaneous, TID AC  latanoprost, 1 drop, Both Eyes, Nightly  nystatin, 5 mL, Oral, 4x Daily    Infusions  pantoprazole, 8 mg/hr, Last Rate: 8 mg/hr (02/06/23 0654)    Diet  Diet: Liquid Diets; Full Liquid; Texture: Regular Texture (IDDSI 7); Fluid Consistency: Thin (IDDSI 0)       Assessment/Plan     Active Hospital Problems    Diagnosis  POA   • **ABLA (acute blood loss anemia) [D62]  Yes   • Atherosclerosis of abdominal aorta (HCC) [I70.0]  Yes   • Chronic diastolic (congestive) heart failure (HCC) [I50.32]  Yes   • Personal history of transient ischemic attack (TIA), 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 deficits [Z86.73]  Not Applicable   • CAD (coronary artery disease) [I25.10]  Yes   • Myeloproliferative disorder (HCC) [D47.1]  Yes   • Paroxysmal atrial fibrillation (HCC) [I48.0]  Yes   • Type 2 diabetes mellitus with circulatory disorder, 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       82 y.o. female admitted with ABLA (acute blood loss anemia).      02/06/23  Monitor Hb    Acute blood loss anemia  Melena  -Hb 6.1 OA > 8.6  -GI following-consideration for EGD/push enteroscopy and C-scope once off Plavix 5 days if GIB more likely than MPD    Chronic diastolic heart failure  -TTE (9/1/22): EF 57%   -Cardiology following  -Continue Lasix 40 mg    DM2  -Hold Jardiance  -Last A1c 6.7 (11/28/22)  -SSI    Myeloproliferative disorder  Anemia, macrocytic  Leukocytosis  -On Jakafi 15 mg twice daily-holding given association with hemorrhage  -Hematology following    CAD  -s/p 2 LEFTY 2/2022; LEFTY to diagonal a. 9/2/22 for ISR  -continue atorvastatin 80mg  -Plavix on hold for above  -No ASA given apixaban     Paroxysmal Afib  -RC: Metoprolol 25 mg  -AC: Apixaban, on hold      HTN  -holding home metoprolol 25mg BID    · Eliquis (home  med) for DVT prophylaxis ON HOLD  · Full code.  · Discussed with patient and care team on multidisciplinary rounds.  · Anticipate discharge Pending PT/OT eval. today      Anthony Muñoz MD  [facility]  02/06/23  07:28 EST

## 2023-02-06 NOTE — PLAN OF CARE
The pt was admitted to [facility] 2/2 to abdominal pain, nausea and vomiting. Work-up for possible GI bleed as pt with acute blood loss anemia. The pt lives at a skilled nursing where she is independent with ADL's and mobility. Today the pt was able to complete bed mobility with SBA. S/S LBD. She mobilized to the bathroom for toileting with SBA. Large loose bowel movement present. S/S sinkside grooming prior to returning to her bedside chair. The pt reports she feels at her baseline. No LOB while on her feet today. OT to s/o at this time. She plans to return to her skilled nursing.     Patient was not wearing a face mask during this therapy encounter. Therapist used appropriate personal protective equipment including mask and gloves. Mask used was standard procedure mask. Appropriate PPE was worn during the entire therapy session. Hand hygiene was completed before and after therapy session. Patient is not in enhanced droplet precautions.

## 2023-02-06 NOTE — PLAN OF CARE
Goal Outcome Evaluation:  Plan of Care Reviewed With: patient           Outcome Evaluation: Pt is an 81 yo F admitted with abdominal pain and N/V. Pt also with c/o melenic stools - work-up for possible GI bleed as pt with acute blood loss anemia. Pt from [facility] and reports independence at BL - pt uses a rwx outside her apartment, but able to get around her apt safely with no AD. Pt denies recent falls hx, reports her only issue at this time is frequent loose bowel movements. Pt presents to PT with minimal functional deficits, appearing to be mobilizing at BL. Pt transferred to EOB with SV, stood with SBA, and ambulated a total of 35ft with rwx. Pt used HHA x1 walking to bathroom, demo'd fair independence with toileting, and walked back to chair with SBA. Pt overall very steady and independent with gait, limited only by frequent bowel movements. Pt safe to continue mobilizing to bathroom and in hallway with nsg as tolerated, no further acute PT needs indicated, will sign-off. Anticipate DC back to halfway.

## 2023-02-06 NOTE — CASE MANAGEMENT/SOCIAL WORK
Continued Stay Note  [facility]     Patient Name: Catherine Boyer  MRN: [id number]  Today's Date: 2/6/2023    Admit Date: 2/4/2023    Plan: [facility]   Discharge Plan     Row Name 02/06/23 1520       Plan    Plan [facility]    Patient/Family in Agreement with Plan yes    Plan Comments CCP met with pt at bedside; introduced self and role of CCP. Face sheet information and pharmacy verified. Pt lives in [facility]. Pt no longer drives and is IADL’s. Pt states she has meals twice daily in the dining cohn and has her groceries delivered. Pt has a walker and 2L of nocturnal O2 thru [employer]. Pt has a living will. Pt would like to enroll in meds to beds and denies trouble affording or managing her medications. Pt is current with [facility]  and has never been to SNF. DC plan is to return to [facility], family to transport. [facility] notified. Katheryn ZENG/CCP               Discharge Codes    No documentation.               Expected Discharge Date and Time     Expected Discharge Date Expected Discharge Time    Feb 8, 2023             Alejandra Iverson RN

## 2023-02-06 NOTE — THERAPY EVALUATION
Patient Name: Catherine Boyer  : 1940    MRN: [id number]                              Today's Date: 2023       Admit Date: 2023    Visit Dx:     ICD-10-CM ICD-9-CM   1. ABLA (acute blood loss anemia)  D62 285.1   2. Gastrointestinal hemorrhage, unspecified gastrointestinal hemorrhage type  K92.2 578.9   3. Chronic anticoagulation  Z79.01 V58.61     Patient Active Problem List   Diagnosis   • CAD (coronary artery disease)   • Nonbacterial thrombotic endocarditis   • Paroxysmal atrial fibrillation (HCC)   • Myeloproliferative disorder (HCC)   • Microcytic anemia   • Type 2 diabetes mellitus with circulatory disorder, without long-term current use of insulin (HCC)   • GERD (gastroesophageal reflux disease)   • Hypertension   • Personal history of transient ischemic attack (TIA), 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 deficits   • COVID   • Porcelain gallbladder   • Breast cancer (HCC)   • Chronic diastolic (congestive) heart failure (HCC)   • Cholecystitis   • Urinary tract infection without hematuria, site unspecified   • Epigastric abdominal pain   • Gastritis   • ABLA (acute blood loss anemia)   • Atherosclerosis of abdominal aorta (HCC)     Past Medical History:   Diagnosis Date   • Atherosclerosis of abdominal aorta (HCC) 2023   • Atrial fibrillation (HCC)    • Breast cancer (HCC)    • CAD (coronary artery disease)     NSTEMI 2022: 90% ostial LAD, 99% D1, 70% mid-distal LAD (medical therapy). She received two stents (2.5x18 and 2.5x26mm Finesse LEFTY) but I don't know which one went to which lesion.   • Carotid atherosclerosis    • Chronic diastolic (congestive) heart failure (HCC)    • GERD (gastroesophageal reflux disease)    • Glaucoma    • History of cataract    • Hypertension    • Microcytic anemia     per Dr. oleg pate office  note 22-dd   • Myeloproliferative disorder (HCC)     JAK2 positive   • Nonbacterial thrombotic endocarditis     2021: 4x5mm vegetation on the ventricular  surface of the anterior MV, negative blood cultures   • Porcelain gallbladder    • PUD (peptic ulcer disease)    • TIA (transient ischemic attack)    • Type 2 diabetes mellitus (HCC)    • Type 2 diabetes mellitus with circulatory disorder, without long-term current use of insulin (HCC) 7/14/2022   • Upper GI bleed      Past Surgical History:   Procedure Laterality Date   • BREAST LUMPECTOMY  1999   • CARDIAC CATHETERIZATION N/A 09/02/2022    Procedure: Coronary angiography;  Surgeon: Guero Verde MD;  Location: [facility] CATH INVASIVE LOCATION;  Service: Cardiovascular;  Laterality: N/A;   • CARDIAC CATHETERIZATION N/A 09/02/2022    Procedure: Stent LEFTY coronary;  Surgeon: Guero Verde MD;  Location: [facility] CATH INVASIVE LOCATION;  Service: Cardiovascular;  Laterality: N/A;   • CATARACT EXTRACTION  2011   • CHOLECYSTECTOMY WITH INTRAOPERATIVE CHOLANGIOGRAM N/A 11/28/2022    Procedure: CHOLECYSTECTOMY LAPAROSCOPIC INTRAOPERATIVE CHOLANGIOGRAM;  Surgeon: Dani Grover Jr., MD;  Location: [facility] MAIN OR;  Service: General;  Laterality: N/A;   • ENDOSCOPY N/A 1/25/2023    Procedure: ESOPHAGOGASTRODUODENOSCOPY WITH BIOPSIES;  Surgeon: Charles Diaz MD;  Location: [facility] ENDOSCOPY;  Service: Gastroenterology;  Laterality: N/A;  pre: abd pain, nausea  post: hiatal hernia, mild gastritis, sloughing of the esophagus   • JOINT REPLACEMENT        General Information     Row Name 02/06/23 1313          Physical Therapy Time and Intention    Document Type evaluation  -     Mode of Treatment co-treatment;physical therapy;occupational therapy  -     Row Name 02/06/23 1313          General Information    Patient Profile Reviewed yes  -     Prior Level of Function independent:;gait;transfer;bed mobility  -     Existing Precautions/Restrictions no known precautions/restrictions  -     Barriers to Rehab none identified  -     Row Name 02/06/23 1313          Living Environment    People in Home facility resident   [facility] Name 02/06/23 1313          Home Main Entrance    Number of Stairs, Main Entrance none  -[facility] Name 02/06/23 1313          Stairs Within Home, Primary    Number of Stairs, Within Home, Primary none  -[facility] Name 02/06/23 1313          Cognition    Orientation Status (Cognition) oriented x 4  -[facility] Name 02/06/23 1313          Safety Issues, Functional Mobility    Impairments Affecting Function (Mobility) endurance/activity tolerance  -           User Key  (r) = Recorded By, (t) = Taken By, (c) = Cosigned By    Initials Name Provider Type     Janna Reinoso PT Physical Therapist               Mobility     Row Name 02/06/23 1314          Bed Mobility    Bed Mobility supine-sit  -     Supine-Sit [place] (Bed Mobility) supervision  -     Assistive Device (Bed Mobility) bed rails;head of bed elevated  -[facility] Name 02/06/23 1314          Sit-Stand Transfer    Sit-Stand [place] (Transfers) standby assist  -     Assistive Device (Sit-Stand Transfers) other (see comments)  No AD  -[facility] Name 02/06/23 1314          Gait/Stairs (Locomotion)    [place] Level (Gait) standby assist  -     Assistive Device (Gait) other (see comments)  HHA x1  -     Distance in Feet (Gait) 15ft + 20ft  -BH     Deviations/Abnormal Patterns (Gait) jeanna decreased;gait speed decreased  -           User Key  (r) = Recorded By, (t) = Taken By, (c) = Cosigned By    Initials Name Provider Type     Janna Reinoso PT Physical Therapist               Obj/Interventions     Row Name 02/06/23 1316          Range of Motion Comprehensive    General Range of Motion bilateral lower extremity ROM WFL  -[facility] Name 02/06/23 1316          Strength Comprehensive (MMT)    General Manual Muscle Testing (MMT) Assessment no strength deficits identified  -     Comment, General Manual Muscle Testing (MMT) Assessment BLE grossly 4+/5  -[facility] Name 02/06/23 1316          Balance     Balance Assessment sitting static balance;sitting dynamic balance;standing static balance;standing dynamic balance  -     Static Sitting Balance modified independence  -     Dynamic Sitting Balance modified independence  -     Position, Sitting Balance unsupported;sitting edge of bed  -     Static Standing Balance standby assist  -     Dynamic Standing Balance standby assist  -     Position/Device Used, Standing Balance unsupported  -     Balance Interventions sitting;standing;sit to stand;supported;static;dynamic  -     Comment, Balance Intermittent HHA x1  -     Row Name 02/06/23 1316          Sensory Assessment (Somatosensory)    Sensory Assessment (Somatosensory) LE sensation intact  -           User Key  (r) = Recorded By, (t) = Taken By, (c) = Cosigned By    Initials Name Provider Type     Janna Reinoso PT Physical Therapist               Goals/Plan    No documentation.                Clinical Impression     Row Name 02/06/23 1317          Pain    Pretreatment Pain Rating 0/10 - no pain  -     Posttreatment Pain Rating 0/10 - no pain  -     Row Name 02/06/23 1317          Plan of Care Review    Plan of Care Reviewed With patient  -     Outcome Evaluation Pt is an 81 yo F admitted with abdominal pain and N/V. Pt also with c/o melenic stools - work-up for possible GI bleed as pt with acute blood loss anemia. Pt from [facility] and reports independence at BL - pt uses a rwx outside her apartment, but able to get around her apt safely with no AD. Pt denies recent falls hx, reports her only issue at this time is frequent loose bowel movements. Pt presents to PT with minimal functional deficits, appearing to be mobilizing at BL. Pt transferred to EOB with SV, stood with SBA, and ambulated a total of 35ft with rwx. Pt used HHA x1 walking to bathroom, demo'd fair independence with toileting, and walked back to chair with SBA. Pt overall very steady and independent with gait, limited only by  frequent bowel movements. Pt safe to continue mobilizing to bathroom and in hallway with nsg as tolerated, no further acute PT needs indicated, will sign-off. Anticipate DC back to halfway.  -[facility] Name 02/06/23 1317          Therapy Assessment/Plan (PT)    Criteria for Skilled Interventions Met (PT) no problems identified which require skilled intervention  -     Therapy Frequency (PT) evaluation only  -[facility] Name 02/06/23 1317          Vital Signs    O2 Delivery Pre Treatment supplemental O2  -     O2 Delivery Intra Treatment supplemental O2  -     O2 Delivery Post Treatment supplemental O2  -[facility] Name 02/06/23 1317          Positioning and Restraints    Pre-Treatment Position in bed  -     Post Treatment Position chair  -     In Chair reclined;notified nsg;call light within reach;encouraged to call for assist  No chair alarm - aid present and aware  -           User Key  (r) = Recorded By, (t) = Taken By, (c) = Cosigned By    Initials Name Provider Type     Janna Reinoso, PT Physical Therapist               Outcome Measures     Row Name 02/06/23 1324 02/06/23 1003       How much help from another person do you currently need...    Turning from your back to your side while in flat bed without using bedrails? 4  - 3  -MM    Moving from lying on back to sitting on the side of a flat bed without bedrails? 4  - 3  -MM    Moving to and from a bed to a chair (including a wheelchair)? 3  - 3  -MM    Standing up from a chair using your arms (e.g., wheelchair, bedside chair)? 4  - 3  -MM    Climbing 3-5 steps with a railing? 3  - 2  -MM    To walk in hospital room? 3  - 3  -MM    AM-PAC 6 Clicks Score (PT) 21  - 17  -MM    Highest level of mobility 6 --> Walked 10 steps or more  - 5 --> Static standing  -MM    [facility] Name 02/06/23 1324          Functional Assessment    Outcome Measure Options AM-PAC 6 Clicks Basic Mobility (PT)  -           User Key  (r) = Recorded By, (t) =  Taken By, (c) = Cosigned By    Initials Name Provider Type    MM Orion Sanchez, RN Registered Nurse     Janna Reinoso PT Physical Therapist                             Physical Therapy Education     Title: PT OT SLP Therapies (In Progress)     Topic: Physical Therapy (Done)     Point: Mobility training (Done)     Learning Progress Summary           Patient Acceptance, E,D,TB, VU by  at 2/6/2023 1324                   Point: Home exercise program (Done)     Learning Progress Summary           Patient Acceptance, E,D,TB, VU by  at 2/6/2023 1324                   Point: Body mechanics (Done)     Learning Progress Summary           Patient Acceptance, E,D,TB, VU by  at 2/6/2023 1324                   Point: Precautions (Done)     Learning Progress Summary           Patient Acceptance, E,D,TB, VU by  at 2/6/2023 1324                               User Key     Initials Effective Dates Name Provider Type Discipline     04/08/22 -  Janna Reinoso PT Physical Therapist PT              PT Recommendation and Plan     Plan of Care Reviewed With: patient  Outcome Evaluation: Pt is an 83 yo F admitted with abdominal pain and N/V. Pt also with c/o melenic stools - work-up for possible GI bleed as pt with acute blood loss anemia. Pt from nursing home and reports independence at BL - pt uses a rwx outside her apartment, but able to get around her apt safely with no AD. Pt denies recent falls hx, reports her only issue at this time is frequent loose bowel movements. Pt presents to PT with minimal functional deficits, appearing to be mobilizing at BL. Pt transferred to EOB with SV, stood with SBA, and ambulated a total of 35ft with rwx. Pt used HHA x1 walking to bathroom, demo'd fair independence with toileting, and walked back to chair with SBA. Pt overall very steady and independent with gait, limited only by frequent bowel movements. Pt safe to continue mobilizing to bathroom and in hallway with nsg as tolerated, no further  acute PT needs indicated, will sign-off. Anticipate DC back to custodial.     Time Calculation:    PT Charges     Row Name 02/06/23 1325             Time Calculation    Start Time 1002  -      Stop Time 1023  -      Time Calculation (min) 21 min  -      PT Received On 02/06/23  -         Time Calculation- PT    Total Timed Code Minutes- PT 18 minute(s)  -         Timed Charges    56802 - Gait Training Minutes  10  -      10208 - PT Therapeutic Activity Minutes 8  -         Untimed Charges    PT Eval/Re-eval Minutes 5  -         Total Minutes    Timed Charges Total Minutes 18  -      Untimed Charges Total Minutes 5  -       Total Minutes 23  -            User Key  (r) = Recorded By, (t) = Taken By, (c) = Cosigned By    Initials Name Provider Type     Janna Reinoso, PT Physical Therapist              Therapy Charges for Today     Code Description Service Date Service Provider Modifiers Qty    80010693511 HC GAIT TRAINING EA 15 MIN 2/6/2023 Janna Reinoso, PT GP 1    64871838524 HC PT EVAL LOW COMPLEXITY 3 2/6/2023 Janna Reinoso, PT GP 1          PT G-Codes  Outcome Measure Options: AM-PAC 6 Clicks Basic Mobility (PT)  AM-PAC 6 Clicks Score (PT): 21  PT Discharge Summary  Anticipated Discharge Disposition (PT): assisted living    Janna Reinoso PT  2/6/2023

## 2023-02-06 NOTE — PROGRESS NOTES
[facility] INPATIENT PROGRESS NOTE  Subjective     CC: Myeloproliferative disorder      History of present illness:  The patient is a 82 y.o. year old female with medical history significant for JAK2 positive myeloproliferative disorder, perhaps primary myelofibrosis, A-fib on chronic Eliquis, CAD with stents and on Plavix, history of endocarditis and TIA had presented to [facility] on 2/4/2023 with abdominal pain and black stools for several days.     Lab work revealed hemoglobin of 6.1, WBC count of 56.36 and platelet count of 385,000.  CT A/P noted no evidence of acute abnormality in the abdomen or pelvis.  Small hiatal hernia.  Previous cholecystectomy.  Sigmoid diverticulosis without evidence of diverticulitis.     Of note, patient was hospitalized around 2 weeks ago with abdominal pain.  MRCP on 1/25/2023 had shown minimal intrahepatic biliary prominence, no CBD/PD dilatation, 5 mm lesion in the body of pancreas possibly representing IPMN.       EGD performed 1/25/2023 had noted mild mucosal changes characterized by slowing in the lower third of the esophagus.  Patchy mild inflammation characterized by congestion and erythema found in the gastric body.  The examined duodenum was normal.     Patient has been seen by GI.  Started on PPI.  Eliquis and Plavix on hold.  Pending clinical course, plan to consider colonoscopy +/- EGD/push enteroscopy.     Hematology/oncology consulted for further evaluation and management.  Patient resting comfortably in bed.  Says she had a BM today which was dark brown in color.  Denies any bleeding from anywhere else.    Initial recommendations include anticoagulation on hold with Eliquis and Plavix, on PPI, Jakafi held, IV iron given, consideration of Hydrea if needed.       Interval history:   2/6/2023  T97.5, pulse 110, respirations 16, BP 97/48, SPO2 94%  Patient, again, states her abdominal pain is now resolved  Patient improved per abdominal  pain.  Again enteroscopy and C-scope with Plavix discontinued for 5 days.  H&H 8.6 and 25.9, white count 43,110, platelet count 305,000             Medications:  The current medication list was reviewed in the EMR.    Allergies:    Allergies   Allergen Reactions   • Aspirin Unknown - Low Severity   • Oxycodone Unknown - Low Severity   • Hydroxyurea Other (See Comments)     Her hemoglobin drop and was stop in February 2022 and start taking Jakafi   • Diphenhydramine Hcl Anxiety and Unknown (See Comments)     Benadryl IVP       Objective      Vitals:    02/06/23 1409   BP: 97/48   Pulse: 110   Resp: 16   Temp: 97.5 °F (36.4 °C)   SpO2: 94%      Physical Exam  Constitutional:       Appearance: Normal appearance.   HENT:      Head: Normocephalic and atraumatic.      Nose: Nose normal.      Mouth/Throat:      Mouth: Mucous membranes are moist.      Pharynx: Oropharynx is clear.   Eyes:      Extraocular Movements: Extraocular movements intact.      Conjunctiva/sclera: Conjunctivae normal.      Pupils: Pupils are equal, round, and reactive to light.   Cardiovascular:      Rate and Rhythm: Normal rate and regular rhythm.      Pulses: Normal pulses.      Heart sounds: Normal heart sounds.   Pulmonary:      Effort: Pulmonary effort is normal.      Breath sounds: Normal breath sounds.   Abdominal:      General: Bowel sounds are normal.      Palpations: Abdomen is soft. There is no mass.   Musculoskeletal:         General: Normal range of motion.      Cervical back: Normal range of motion and neck supple.   Skin:     General: Skin is warm and dry.   Neurological:      General: No focal deficit present.      Mental Status: She is alert and oriented to person, place, and time.   Psychiatric:         Behavior: Behavior normal.       RECENT LABS:    Results from last 7 days   Lab Units 02/06/23  0323 02/05/23  2334 02/05/23  1610 02/05/23  0806 02/05/23  0356 02/04/23  2335 02/04/23  1419   WBC 10*3/mm3 43.11*  --   --   --  49.85*   --  56.36*   HEMOGLOBIN g/dL 8.6* 8.5* 8.9*   < > 7.1*   < > 6.1*   HEMATOCRIT % 25.9* 25.9* 27.6*   < > 21.5*   < > 18.4*   PLATELETS 10*3/mm3 305  --   --   --  334  --  385   MONOCYTES % % 1.1*  --   --   --   --   --  11.0    < > = values in this interval not displayed.     Results from last 7 days   Lab Units 02/06/23  0323 02/05/23  0356 02/04/23  1419   SODIUM mmol/L 136 136 136   POTASSIUM mmol/L 2.8* 3.6 4.3   CHLORIDE mmol/L 99 104 100   CO2 mmol/L 26.9 24.1 26.0   BUN mg/dL 10 11 13   CREATININE mg/dL 0.66 0.71 0.76   CALCIUM mg/dL 7.8* 8.2* 8.6   BILIRUBIN mg/dL  --  0.9 0.6   ALK PHOS U/L  --  171* 194*   ALT (SGPT) U/L  --  41* 52*   AST (SGOT) U/L  --  23 35*   GLUCOSE mg/dL 59* 75 119*     Results from last 7 days   Lab Units 02/05/23  0356   MAGNESIUM mg/dL 2.0       Assessment & Plan     Ms. Boyer is a pleasant 82-year-old female with medical history significant for JAK2 positive myeloproliferative disorder, perhaps primary myelofibrosis, A-fib on chronic Eliquis, CAD with stents and on Plavix admitted with GI bleed.     #GI bleed, likely upper:  • Presented with abdominal pain and black stool.  Hemoglobin 6.1 on presentation.  • EGD performed during last admission on 1/25/2023 had noted mild mucosal changes characterized by slowing in the lower third of the esophagus.  Patchy mild inflammation characterized by congestion and erythema found in the gastric body.  The examined duodenum was normal.  Multiple biopsies taken.  • GI on board.  Thinks she may have oozed from the prior biopsy site.  • Anticoagulation with Eliquis and Plavix on hold.  On PPI  • GI follow-up- EGD/push enteroscopy and colonoscopy when Eliquis held 5 days     #Severe anemia, likely blood loss related in the setting of primary myelofibrosis:  • Hemoglobin 6.1 on admission.  Iron profile showed normal iron level of 63, ferritin of 115 and iron saturation of 12%.  • Hemoglobin did improve to 7.3 after transfusion, however  dropped again to 6.8.  Being transfused 1 unit PRBC.  • Continue transfusion to keep hemoglobin > 7.   • Will hold jakafi as it can be associated with anemia and hemorrhage.  • Will administer IV iron as well.  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 Reinoso in our practice.  • She is currently on Jakafi 15 mg BID as outpatient.  • Given some risk of hemorrhage associated with jakafi, will hold hold Jakafi while inpatient.     #Leukocytosis, neutrophilic predominant:   • WBC count 56.36 on admission on 2/4/2023  • This is likely myeloproliferative disease related.  His WBC count has ranged between 30- 50,000 since November 2022.  • Last bone marrow biopsy from October 2022 had shown hypercellular marrow at 95% with involvement by chronic myeloproliferative neoplasm, less than 5% blasts.  Mild reticulin fibrosis.  Decreased iron stores.  Consideration of CMML.    • Will monitor for now.  Consider hydroxyurea if worsening leukocytosis.     #A-fib: Eliquis on hold     #CAD s/p PCI: Plavix on hold.     Recommendations:  -Again, hold jakafi for now  -IV iron not completed  -Will consider hydroxyurea if worsening leukocytosis  -Transfuse to keep hemoglobin > 7  -Rest per GI and primary  -We will continue to follow                 Venancio Barajas MD  2/6/2023  14:38 EST

## 2023-02-06 NOTE — PROGRESS NOTES
[facility]  Inpatient Progress Note    Reason for Follow Up:  Abdominal pain, reported melena, anemia    Subjective     Interval History:   No significant stenosis seen on mesenteric Doppler yesterday.  Discussed with patient.  She ate breakfast this morning and has not had any abdominal pain.  She reports that she had a bowel movement this morning that was pure water.    Current Facility-Administered Medications:   •  acetaminophen (TYLENOL) tablet 650 mg, 650 mg, Oral, Q4H PRN, Jaimee Macias MD, 650 mg at 02/05/23 1856  •  atorvastatin (LIPITOR) tablet 80 mg, 80 mg, Oral, Nightly, Jaimee Macias MD, 80 mg at 02/05/23 2002  •  dextrose (D50W) (25 g/50 mL) IV injection 25 g, 25 g, Intravenous, Q15 Min PRN, Orlando Sewell MD  •  dextrose (GLUTOSE) oral gel 15 g, 15 g, Oral, Q15 Min PRN, Orlando Sewell MD  •  empagliflozin (JARDIANCE) tablet 10 mg, 10 mg, Oral, Daily, Jaimee Macias MD, 10 mg at 02/05/23 0927  •  furosemide (LASIX) tablet 40 mg, 40 mg, Oral, Daily, Jaimee Macias MD, 40 mg at 02/05/23 0927  •  glucagon (GLUCAGEN) injection 1 mg, 1 mg, Intramuscular, Q15 Min PRN, Orlando Sewell MD  •  insulin lispro (ADMELOG) injection 0-7 Units, 0-7 Units, Subcutaneous, TID AC, Orlando Sewell MD  •  latanoprost (XALATAN) 0.005 % ophthalmic solution 1 drop, 1 drop, Both Eyes, Nightly, Jaimee Macias MD, 1 drop at 02/05/23 2004  •  melatonin tablet 3 mg, 3 mg, Oral, Nightly PRN, Jaimee Macias MD  •  nystatin (MYCOSTATIN) 100,000 unit/mL suspension 500,000 Units, 5 mL, Oral, 4x Daily, Charles Diaz MD, 500,000 Units at 02/05/23 2002  •  ondansetron (ZOFRAN) tablet 4 mg, 4 mg, Oral, Q6H PRN **OR** ondansetron (ZOFRAN) injection 4 mg, 4 mg, Intravenous, Q6H PRN, Jaimee Macias MD  •  pantoprazole (PROTONIX) 40 mg in 100 mL NS (VTB), 8 mg/hr, Intravenous, Continuous, Jamel Emmanuel II, MD, Last Rate: 20  mL/hr at 02/06/23 0654, 8 mg/hr at 02/06/23 0654  •  potassium chloride (K-DUR,KLOR-CON) ER tablet 40 mEq, 40 mEq, Oral, PRN, 40 mEq at 02/06/23 0539 **OR** potassium chloride (KLOR-CON) packet 40 mEq, 40 mEq, Oral, PRN **OR** potassium chloride 10 mEq in 100 mL IVPB, 10 mEq, Intravenous, Q1H PRN, Faustina Barcenas APRN  •  [COMPLETED] Insert Peripheral IV, , , Once **AND** sodium chloride 0.9 % flush 10 mL, 10 mL, Intravenous, PRN, Jamel Emmanuel II, MD  Review of Systems:    Positive for diarrhea, negative for abdominal pain, negative for fever chills    Objective     Vital Signs  Temp:  [97 °F (36.1 °C)-98.8 °F (37.1 °C)] 97 °F (36.1 °C)  Heart Rate:  [] 102  Resp:  [16-18] 18  BP: (115-136)/(61-77) 115/61  Body mass index is 27.78 kg/m².    Intake/Output Summary (Last 24 hours) at 2/6/2023 0912  Last data filed at 2/6/2023 0545  Gross per 24 hour   Intake 680 ml   Output --   Net 680 ml     No intake/output data recorded.     Physical Exam:   General: patient awake, alert and cooperative   Eyes: Normal lids and lashes, no scleral icterus   Neck: supple, normal ROM   Skin: warm and dry, not jaundiced   Pulm:  regular and unlabored   Abdomen: soft, nontender, nondistended; normal bowel sounds   Psychiatric: Normal mood and behavior; memory intact     Results Review:     I reviewed the patient's new clinical results.    Results from last 7 days   Lab Units 02/06/23  0323 02/05/23  2334 02/05/23  1610 02/05/23  0806 02/05/23  0356 02/04/23  2335 02/04/23  1419   WBC 10*3/mm3 43.11*  --   --   --  49.85*  --  56.36*   HEMOGLOBIN g/dL 8.6* 8.5* 8.9*   < > 7.1*   < > 6.1*   HEMATOCRIT % 25.9* 25.9* 27.6*   < > 21.5*   < > 18.4*   PLATELETS 10*3/mm3 305  --   --   --  334  --  385    < > = values in this interval not displayed.     Results from last 7 days   Lab Units 02/06/23  0323 02/05/23  0356 02/04/23  1419   SODIUM mmol/L 136 136 136   POTASSIUM mmol/L 2.8* 3.6 4.3   CHLORIDE mmol/L 99 104 100   CO2  mmol/L 26.9 24.1 26.0   BUN mg/dL 10 11 13   CREATININE mg/dL 0.66 0.71 0.76   CALCIUM mg/dL 7.8* 8.2* 8.6   BILIRUBIN mg/dL  --  0.9 0.6   ALK PHOS U/L  --  171* 194*   ALT (SGPT) U/L  --  41* 52*   AST (SGOT) U/L  --  23 35*   GLUCOSE mg/dL 59* 75 119*     Results from last 7 days   Lab Units 02/04/23  1419   INR  1.16*     Lab Results   Lab Value Date/Time    LIPASE 10 (L) 02/04/2023 1419    LIPASE 13 01/23/2023 0216    LIPASE 20 11/21/2022 0250    LIPASE 38 08/31/2022 2131    LIPASE 16.0 02/09/2022 0525    LIPASE 50 06/29/2021 1131       Radiology:  CT Abdomen Pelvis With Contrast   Final Result   1. No evidence for acute abnormality of the abdomen or pelvis.   2. Small hiatal hernia.   3. 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 Suspect small left   renal cyst.   4. Previous cholecystectomy.   5. Sigmoid diverticulosis without evidence for diverticulitis.       Radiation dose reduction techniques were utilized, including automated   exposure control and exposure modulation based on body size.       This report was finalized on 2/4/2023 4:13 PM by Dr. Torey Bedoya M.D.              Assessment & Plan     Patient Active Problem List   Diagnosis   • CAD (coronary artery disease)   • Nonbacterial thrombotic endocarditis   • Paroxysmal atrial fibrillation (HCC)   • Myeloproliferative disorder (HCC)   • Microcytic anemia   • Type 2 diabetes mellitus with circulatory disorder, without long-term current use of insulin (HCC)   • GERD (gastroesophageal reflux disease)   • Hypertension   • Personal history of transient ischemic attack (TIA), 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 deficits   • COVID   • Porcelain gallbladder   • Breast cancer (HCC)   • Chronic diastolic (congestive) heart failure (HCC)   • Cholecystitis   • Urinary tract infection without hematuria, site unspecified   • Epigastric abdominal pain   • Gastritis   • ABLA (acute blood loss anemia)   • Atherosclerosis of abdominal aorta (HCC)      All problems are new to me today  Assessment:  1.  Melena: With only minimal decline in her hemoglobin following discharge 1/26.  No further episodes     2.  Acute on chronic anemia: Likely related to above plus myeloproliferative disorder.  She has normocytic indices     3.  Generalized abdominal pain-mesenteric Doppler negative for significant stenosis     4.  A-fib on Eliquis     5.  CAD on Plavix     6.  Myeloproliferative disorder: With significantly elevated WBC     7.  Normal MRI biliary ducts: Minimal intrahepatic prominence     8.  Elevated liver enzymes: Predominantly alkaline phosphatase.  Normal bilirubin.  Nonobstructive pattern.      Plan:  · Hematology evaluation noted-holding Jakafi due to risk of hemorrhage associated with this medication.  · Continue diet as tolerated  · Consideration of EGD/push enteroscopy and colonoscopy when anticoagulation is cleared from her system and she has been off Plavix for 5 days if there remains significant concern that GI bleeding is contributing to her anemia (versus her myeloproliferative disorder)  · Patient reports diarrhea this morning-isolated episode.  No other symptoms.  If symptoms persist, will pursue work-up    I discussed the patients findings and my recommendations with patient.    Annika Guzman MD

## 2023-02-07 PROBLEM — E87.6 HYPOKALEMIA: Status: ACTIVE | Noted: 2023-02-07

## 2023-02-07 LAB
ANION GAP SERPL CALCULATED.3IONS-SCNC: 11.8 MMOL/L (ref 5–15)
BUN SERPL-MCNC: 6 MG/DL (ref 8–23)
BUN/CREAT SERPL: 8.2 (ref 7–25)
CALCIUM SPEC-SCNC: 8.2 MG/DL (ref 8.6–10.5)
CHLORIDE SERPL-SCNC: 101 MMOL/L (ref 98–107)
CO2 SERPL-SCNC: 26.2 MMOL/L (ref 22–29)
CREAT SERPL-MCNC: 0.73 MG/DL (ref 0.57–1)
DEPRECATED RDW RBC AUTO: 51.3 FL (ref 37–54)
EGFRCR SERPLBLD CKD-EPI 2021: 81.2 ML/MIN/1.73
ERYTHROCYTE [DISTWIDTH] IN BLOOD BY AUTOMATED COUNT: 14.5 % (ref 12.3–15.4)
GLUCOSE BLDC GLUCOMTR-MCNC: 108 MG/DL (ref 70–130)
GLUCOSE BLDC GLUCOMTR-MCNC: 115 MG/DL (ref 70–130)
GLUCOSE BLDC GLUCOMTR-MCNC: 131 MG/DL (ref 70–130)
GLUCOSE BLDC GLUCOMTR-MCNC: 142 MG/DL (ref 70–130)
GLUCOSE SERPL-MCNC: 90 MG/DL (ref 65–99)
HCT VFR BLD AUTO: 27.1 % (ref 34–46.6)
HCT VFR BLD AUTO: 27.2 % (ref 34–46.6)
HGB BLD-MCNC: 8.6 G/DL (ref 12–15.9)
HGB BLD-MCNC: 9 G/DL (ref 12–15.9)
MCH RBC QN AUTO: 31.9 PG (ref 26.6–33)
MCHC RBC AUTO-ENTMCNC: 33.1 G/DL (ref 31.5–35.7)
MCV RBC AUTO: 96.5 FL (ref 79–97)
MITOCHONDRIA M2 IGG SER-ACNC: <20 UNITS (ref 0–20)
PLATELET # BLD AUTO: 253 10*3/MM3 (ref 140–450)
PMV BLD AUTO: 11.6 FL (ref 6–12)
POTASSIUM SERPL-SCNC: 3 MMOL/L (ref 3.5–5.2)
POTASSIUM SERPL-SCNC: 4.4 MMOL/L (ref 3.5–5.2)
RBC # BLD AUTO: 2.82 10*6/MM3 (ref 3.77–5.28)
SODIUM SERPL-SCNC: 139 MMOL/L (ref 136–145)
WBC NRBC COR # BLD: 49.82 10*3/MM3 (ref 3.4–10.8)

## 2023-02-07 PROCEDURE — 82962 GLUCOSE BLOOD TEST: CPT

## 2023-02-07 PROCEDURE — 84132 ASSAY OF SERUM POTASSIUM: CPT | Performed by: STUDENT IN AN ORGANIZED HEALTH CARE EDUCATION/TRAINING PROGRAM

## 2023-02-07 PROCEDURE — 99231 SBSQ HOSP IP/OBS SF/LOW 25: CPT | Performed by: NURSE PRACTITIONER

## 2023-02-07 PROCEDURE — 99231 SBSQ HOSP IP/OBS SF/LOW 25: CPT | Performed by: INTERNAL MEDICINE

## 2023-02-07 PROCEDURE — 85018 HEMOGLOBIN: CPT | Performed by: NURSE PRACTITIONER

## 2023-02-07 PROCEDURE — 99232 SBSQ HOSP IP/OBS MODERATE 35: CPT | Performed by: INTERNAL MEDICINE

## 2023-02-07 PROCEDURE — 85014 HEMATOCRIT: CPT | Performed by: NURSE PRACTITIONER

## 2023-02-07 PROCEDURE — 85025 COMPLETE CBC W/AUTO DIFF WBC: CPT | Performed by: INTERNAL MEDICINE

## 2023-02-07 PROCEDURE — 80048 BASIC METABOLIC PNL TOTAL CA: CPT | Performed by: INTERNAL MEDICINE

## 2023-02-07 RX ORDER — PANTOPRAZOLE SODIUM 40 MG/1
40 TABLET, DELAYED RELEASE ORAL
Status: DISCONTINUED | OUTPATIENT
Start: 2023-02-08 | End: 2023-02-10

## 2023-02-07 RX ADMIN — ATORVASTATIN CALCIUM 80 MG: 80 TABLET, FILM COATED ORAL at 20:50

## 2023-02-07 RX ADMIN — NYSTATIN 500000 UNITS: 100000 SUSPENSION ORAL at 12:27

## 2023-02-07 RX ADMIN — PANTOPRAZOLE SODIUM 8 MG/HR: 40 INJECTION, POWDER, FOR SOLUTION INTRAVENOUS at 13:00

## 2023-02-07 RX ADMIN — POTASSIUM CHLORIDE 40 MEQ: 750 TABLET, EXTENDED RELEASE ORAL at 11:45

## 2023-02-07 RX ADMIN — METOPROLOL SUCCINATE 25 MG: 25 TABLET, EXTENDED RELEASE ORAL at 20:48

## 2023-02-07 RX ADMIN — NYSTATIN 500000 UNITS: 100000 SUSPENSION ORAL at 18:22

## 2023-02-07 RX ADMIN — METOPROLOL SUCCINATE 25 MG: 25 TABLET, EXTENDED RELEASE ORAL at 09:13

## 2023-02-07 RX ADMIN — LATANOPROST 1 DROP: 50 SOLUTION OPHTHALMIC at 20:48

## 2023-02-07 RX ADMIN — FUROSEMIDE 40 MG: 40 TABLET ORAL at 09:13

## 2023-02-07 RX ADMIN — NYSTATIN 500000 UNITS: 100000 SUSPENSION ORAL at 20:48

## 2023-02-07 RX ADMIN — PANTOPRAZOLE SODIUM 8 MG/HR: 40 INJECTION, POWDER, FOR SOLUTION INTRAVENOUS at 02:39

## 2023-02-07 RX ADMIN — POTASSIUM CHLORIDE 40 MEQ: 750 TABLET, EXTENDED RELEASE ORAL at 16:07

## 2023-02-07 RX ADMIN — NYSTATIN 500000 UNITS: 100000 SUSPENSION ORAL at 09:13

## 2023-02-07 RX ADMIN — ACETAMINOPHEN 650 MG: 325 TABLET, FILM COATED ORAL at 23:05

## 2023-02-07 RX ADMIN — ACETAMINOPHEN 650 MG: 325 TABLET, FILM COATED ORAL at 05:05

## 2023-02-07 RX ADMIN — SERTRALINE 50 MG: 50 TABLET, FILM COATED ORAL at 11:44

## 2023-02-07 RX ADMIN — EMPAGLIFLOZIN 10 MG: 10 TABLET, FILM COATED ORAL at 09:13

## 2023-02-07 RX ADMIN — POTASSIUM CHLORIDE 40 MEQ: 750 TABLET, EXTENDED RELEASE ORAL at 09:32

## 2023-02-07 NOTE — PROGRESS NOTES
[facility] INPATIENT PROGRESS NOTE  Subjective     CC: Myeloproliferative disorder      History of present illness:  The patient is a 82 y.o. year old female with medical history significant for JAK2 positive myeloproliferative disorder, perhaps primary myelofibrosis, A-fib on chronic Eliquis, CAD with stents and on Plavix, history of endocarditis and TIA had presented to [facility] ER on 2/4/2023 with abdominal pain and black stools for several days.     Lab work revealed hemoglobin of 6.1, WBC count of 56.36 and platelet count of 385,000.  CT A/P noted no evidence of acute abnormality in the abdomen or pelvis.  Small hiatal hernia.  Previous cholecystectomy.  Sigmoid diverticulosis without evidence of diverticulitis.     Of note, patient was hospitalized around 2 weeks ago with abdominal pain.  MRCP on 1/25/2023 had shown minimal intrahepatic biliary prominence, no CBD/PD dilatation, 5 mm lesion in the body of pancreas possibly representing IPMN.       EGD performed 1/25/2023 had noted mild mucosal changes characterized by slowing in the lower third of the esophagus.  Patchy mild inflammation characterized by congestion and erythema found in the gastric body.  The examined duodenum was normal.     Patient has been seen by GI.  Started on PPI.  Eliquis and Plavix on hold.  Pending clinical course, plan to consider colonoscopy +/- EGD/push enteroscopy.     Hematology/oncology consulted for further evaluation and management.  Patient resting comfortably in bed.  Says she had a BM today which was dark brown in color.  Denies any bleeding from anywhere else.    Initial recommendations include anticoagulation on hold with Eliquis and Plavix, on PPI, Jakafi held, IV iron given, consideration of Hydrea if needed.       Interval history:   2/6/2023  T97.5, pulse 110, respirations 16, BP 97/48, SPO2 94%  Patient, again, states her abdominal pain is now resolved  Patient improved per abdominal  pain.  Again enteroscopy and C-scope with Plavix discontinued for 5 days.  H&H 8.6 and 25.9, white count 43,110, platelet count 305,000    2/7/2023  T90.2, pulse 93, respirations 18,  56  Patient now scheduled for EGD, small bowel enteroscopy and colonoscopy 2/9/2023.  H&H 9.0 and 27.2, white count of 49 820, BUN/creatinine of 6 and 0.73         Medications:  The current medication list was reviewed in the EMR.    Allergies:    Allergies   Allergen Reactions   • Aspirin Unknown - Low Severity   • Oxycodone Unknown - Low Severity   • Hydroxyurea Other (See Comments)     Her hemoglobin drop and was stop in February 2022 and start taking Jakafi   • Diphenhydramine Hcl Anxiety and Unknown (See Comments)     Benadryl IVP       Objective      Vitals:    02/07/23 0900   BP:    Pulse: 93   Resp:    Temp:    SpO2: 97%      Physical Exam  Constitutional:       Appearance: Normal appearance.   HENT:      Head: Normocephalic and atraumatic.      Nose: Nose normal.      Mouth/Throat:      Mouth: Mucous membranes are moist.      Pharynx: Oropharynx is clear.   Eyes:      Extraocular Movements: Extraocular movements intact.      Conjunctiva/sclera: Conjunctivae normal.      Pupils: Pupils are equal, round, and reactive to light.   Cardiovascular:      Rate and Rhythm: Normal rate and regular rhythm.      Pulses: Normal pulses.      Heart sounds: Normal heart sounds.   Pulmonary:      Effort: Pulmonary effort is normal.      Breath sounds: Normal breath sounds.   Abdominal:      General: Bowel sounds are normal.      Palpations: Abdomen is soft. There is no mass.   Musculoskeletal:         General: Normal range of motion.      Cervical back: Normal range of motion and neck supple.   Skin:     General: Skin is warm and dry.   Neurological:      General: No focal deficit present.      Mental Status: She is alert and oriented to person, place, and time.   Psychiatric:         Behavior: Behavior normal.       RECENT  LABS:    Results from last 7 days   Lab Units 02/07/23  0339 02/07/23  0023 02/06/23  1604 02/06/23  0323 02/05/23  0806 02/05/23  0356 02/04/23  2335 02/04/23  1419   WBC 10*3/mm3 49.82*  --   --  43.11*  --  49.85*  --  56.36*   HEMOGLOBIN g/dL 9.0* 8.6* 9.4* 8.6*   < > 7.1*   < > 6.1*   HEMATOCRIT % 27.2* 27.1* 28.3* 25.9*   < > 21.5*   < > 18.4*   PLATELETS 10*3/mm3 253  --   --  305  --  334  --  385   MONOCYTES % %  --   --   --  1.1*  --   --   --  11.0    < > = values in this interval not displayed.     Results from last 7 days   Lab Units 02/07/23  0339 02/06/23  0323 02/05/23  0356 02/04/23  1419   SODIUM mmol/L 139 136 136 136   POTASSIUM mmol/L 3.0* 2.8* 3.6 4.3   CHLORIDE mmol/L 101 99 104 100   CO2 mmol/L 26.2 26.9 24.1 26.0   BUN mg/dL 6* 10 11 13   CREATININE mg/dL 0.73 0.66 0.71 0.76   CALCIUM mg/dL 8.2* 7.8* 8.2* 8.6   BILIRUBIN mg/dL  --   --  0.9 0.6   ALK PHOS U/L  --   --  171* 194*   ALT (SGPT) U/L  --   --  41* 52*   AST (SGOT) U/L  --   --  23 35*   GLUCOSE mg/dL 90 59* 75 119*     Results from last 7 days   Lab Units 02/05/23 0356   MAGNESIUM mg/dL 2.0       Assessment & Plan     Ms. Boyer is a pleasant 82-year-old female with medical history significant for JAK2 positive myeloproliferative disorder, perhaps primary myelofibrosis, A-fib on chronic Eliquis, CAD with stents and on Plavix admitted with GI bleed.     #GI bleed, likely upper:  • Presented with abdominal pain and black stool.  Hemoglobin 6.1 on presentation.  • EGD performed during last admission on 1/25/2023 had noted mild mucosal changes characterized by slowing in the lower third of the esophagus.  Patchy mild inflammation characterized by congestion and erythema found in the gastric body.  The examined duodenum was normal.  Multiple biopsies taken.  • GI on board.  Thinks she may have oozed from the prior biopsy site.  • Anticoagulation with Eliquis and Plavix on hold.  On PPI  • GI follow-up- EGD/push enteroscopy and  colonoscopy when Eliquis held 5 days.  This is now scheduled to/9/23     #Severe anemia, likely blood loss related in the setting of primary myelofibrosis:  • Hemoglobin 6.1 on admission.  Iron profile showed normal iron level of 63, ferritin of 115 and iron saturation of 12%.  • Hemoglobin did improve to 7.3 after transfusion, however dropped again to 6.8.  Being transfused 1 unit PRBC.  • Continue transfusion to keep hemoglobin > 7.   • Will hold jakafi as it can be associated with anemia and hemorrhage.  • Will administer IV iron as well.  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 Reinoso in our practice.  • She is currently on Jakafi 15 mg BID as outpatient.  • Given some risk of hemorrhage associated with jakafi, will hold hold Jakafi while inpatient.     #Leukocytosis, neutrophilic predominant:   • WBC count 56.36 on admission on 2/4/2023  • This is likely myeloproliferative disease related.  His WBC count has ranged between 30- 50,000 since November 2022.  • Last bone marrow biopsy from October 2022 had shown hypercellular marrow at 95% with involvement by chronic myeloproliferative neoplasm, less than 5% blasts.  Mild reticulin fibrosis.  Decreased iron stores.  Consideration of CMML.    • Will monitor for now.  Consider hydroxyurea if worsening leukocytosis.     #A-fib: Eliquis on hold     #CAD s/p PCI: Plavix on hold.     Recommendations:  -Again, hold jakafi for now  -IV iron now completed  -Will consider hydroxyurea if worsening leukocytosis  -Transfuse to keep hemoglobin > 7  -EGD and colonoscopy schedule 2/9/2023  -We will continue to follow                 Venancio Barajas MD  2/7/2023  13:20 EST

## 2023-02-07 NOTE — PROGRESS NOTES
[facility]  Inpatient Progress Note    Reason for Follow Up:  Abdominal pain, reported melena, anemia    Subjective     Interval History:   She has no abdominal pain.  Her stool is brown.  From reading her notes it looks like on 2/9/2023 she will of been off Eliquis and Plavix for 5 days.    Current Facility-Administered Medications:   •  acetaminophen (TYLENOL) tablet 650 mg, 650 mg, Oral, Q4H PRN, Jaimee Macias MD, 650 mg at 02/07/23 0505  •  atorvastatin (LIPITOR) tablet 80 mg, 80 mg, Oral, Nightly, Jaimee Macias MD, 80 mg at 02/06/23 2009  •  dextrose (D50W) (25 g/50 mL) IV injection 25 g, 25 g, Intravenous, Q15 Min PRN, Orlando Sewell MD  •  dextrose (GLUTOSE) oral gel 15 g, 15 g, Oral, Q15 Min PRN, Orlando Sewell MD  •  empagliflozin (JARDIANCE) tablet 10 mg, 10 mg, Oral, Daily, Jaimee Macias MD, 10 mg at 02/07/23 0913  •  furosemide (LASIX) tablet 40 mg, 40 mg, Oral, Daily, Jaimee Macias MD, 40 mg at 02/07/23 0913  •  glucagon (GLUCAGEN) injection 1 mg, 1 mg, Intramuscular, Q15 Min PRN, Orlando Sewell MD  •  insulin lispro (ADMELOG) injection 0-7 Units, 0-7 Units, Subcutaneous, TID AC, Orlando Sewell MD  •  latanoprost (XALATAN) 0.005 % ophthalmic solution 1 drop, 1 drop, Both Eyes, Nightly, Jaimee Macias MD, 1 drop at 02/06/23 2009  •  melatonin tablet 3 mg, 3 mg, Oral, Nightly PRN, Jaimee Macias MD, 3 mg at 02/06/23 2350  •  metoprolol succinate XL (TOPROL-XL) 24 hr tablet 25 mg, 25 mg, Oral, Q12H, Juli Brandt APRN, 25 mg at 02/07/23 0913  •  nystatin (MYCOSTATIN) 100,000 unit/mL suspension 500,000 Units, 5 mL, Oral, 4x Daily, Charles Diaz MD, 500,000 Units at 02/07/23 1227  •  ondansetron (ZOFRAN) tablet 4 mg, 4 mg, Oral, Q6H PRN **OR** ondansetron (ZOFRAN) injection 4 mg, 4 mg, Intravenous, Q6H PRN, StingJaimee wharton MD  •  pantoprazole (PROTONIX) 40 mg in 100 mL NS (VTB), 8 mg/hr,  Intravenous, Continuous, Jamel Emmanuel II, MD, Last Rate: 20 mL/hr at 02/07/23 0239, 8 mg/hr at 02/07/23 0239  •  potassium chloride (K-DUR,KLOR-CON) ER tablet 40 mEq, 40 mEq, Oral, PRN, 40 mEq at 02/07/23 1145 **OR** potassium chloride (KLOR-CON) packet 40 mEq, 40 mEq, Oral, PRN **OR** potassium chloride 10 mEq in 100 mL IVPB, 10 mEq, Intravenous, Q1H PRN, Faustina Barcenas APRN  •  sertraline (ZOLOFT) tablet 50 mg, 50 mg, Oral, Daily, HoskingAnthony MD, 50 mg at 02/07/23 1144  •  [COMPLETED] Insert Peripheral IV, , , Once **AND** sodium chloride 0.9 % flush 10 mL, 10 mL, Intravenous, PRN, Jamel Emmanuel II, MD, 10 mL at 02/06/23 2010  Review of Systems:    The following systems were reviewed and negative;  constitution, respiratory, cardiovascular, musculoskeletal and neurological    Objective     Vital Signs  Temp:  [97.5 °F (36.4 °C)-98.9 °F (37.2 °C)] 98.2 °F (36.8 °C)  Heart Rate:  [] 93  Resp:  [16-18] 18  BP: ()/(48-68) 107/56  Body mass index is 27.78 kg/m².    Intake/Output Summary (Last 24 hours) at 2/7/2023 1241  Last data filed at 2/7/2023 0800  Gross per 24 hour   Intake 840 ml   Output --   Net 840 ml     I/O this shift:  In: 240 [P.O.:240]  Out: -      Physical Exam:   General: patient awake, alert and cooperative   Eyes: Normal lids and lashes, no scleral icterus   Neck: supple, normal ROM   Skin: warm and dry, not jaundiced   Cardiovascular: regular rhythm and rate, no murmurs auscultated   Pulm: clear to auscultation bilaterally, regular and unlabored   Abdomen: soft, nontender, nondistended; normal bowel sounds   Extremities: no rash or edema   Psychiatric: Normal mood and behavior; memory intact     Results Review:     I reviewed the patient's new clinical results.    Results from last 7 days   Lab Units 02/07/23  0339 02/07/23  0023 02/06/23  1604 02/06/23  0323 02/05/23  0806 02/05/23  0356   WBC 10*3/mm3 49.82*  --   --  43.11*  --  49.85*   HEMOGLOBIN g/dL  9.0* 8.6* 9.4* 8.6*   < > 7.1*   HEMATOCRIT % 27.2* 27.1* 28.3* 25.9*   < > 21.5*   PLATELETS 10*3/mm3 253  --   --  305  --  334    < > = values in this interval not displayed.     Results from last 7 days   Lab Units 02/07/23  0339 02/06/23  0323 02/05/23  0356 02/04/23  1419   SODIUM mmol/L 139 136 136 136   POTASSIUM mmol/L 3.0* 2.8* 3.6 4.3   CHLORIDE mmol/L 101 99 104 100   CO2 mmol/L 26.2 26.9 24.1 26.0   BUN mg/dL 6* 10 11 13   CREATININE mg/dL 0.73 0.66 0.71 0.76   CALCIUM mg/dL 8.2* 7.8* 8.2* 8.6   BILIRUBIN mg/dL  --   --  0.9 0.6   ALK PHOS U/L  --   --  171* 194*   ALT (SGPT) U/L  --   --  41* 52*   AST (SGOT) U/L  --   --  23 35*   GLUCOSE mg/dL 90 59* 75 119*     Results from last 7 days   Lab Units 02/04/23  1419   INR  1.16*     Lab Results   Lab Value Date/Time    LIPASE 10 (L) 02/04/2023 1419    LIPASE 13 01/23/2023 0216    LIPASE 20 11/21/2022 0250    LIPASE 38 08/31/2022 2131    LIPASE 16.0 02/09/2022 0525    LIPASE 50 06/29/2021 1131       Radiology:  CT Abdomen Pelvis With Contrast   Final Result   1. No evidence for acute abnormality of the abdomen or pelvis.   2. Small hiatal hernia.   3. 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 Suspect small left   renal cyst.   4. Previous cholecystectomy.   5. Sigmoid diverticulosis without evidence for diverticulitis.       Radiation dose reduction techniques were utilized, including automated   exposure control and exposure modulation based on body size.       This report was finalized on 2/4/2023 4:13 PM by Dr. Torey Bedoya M.D.              Assessment & Plan     Patient Active Problem List   Diagnosis   • CAD (coronary artery disease)   • Nonbacterial thrombotic endocarditis   • Paroxysmal atrial fibrillation (HCC)   • Myeloproliferative disorder (HCC)   • Microcytic anemia   • Type 2 diabetes mellitus with circulatory disorder, without long-term current use of insulin (HCC)   • GERD (gastroesophageal reflux disease)   • Hypertension   •  Personal history of transient ischemic attack (TIA), 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 deficits   • COVID   • Porcelain gallbladder   • Breast cancer (HCC)   • Chronic diastolic (congestive) heart failure (HCC)   • Cholecystitis   • Urinary tract infection without hematuria, site unspecified   • Epigastric abdominal pain   • Gastritis   • ABLA (acute blood loss anemia)   • Atherosclerosis of abdominal aorta (HCC)   • Hypokalemia       Assessment/Recommendations:    All problems are new to me today  Assessment:  1.  Melena: With only minimal decline in her hemoglobin following discharge 1/26.  No further episodes.  Her H&H is stable today.     2.  Acute on chronic anemia: Likely related to above plus myeloproliferative disorder.  She has normocytic indices     3.  Generalized abdominal pain-mesenteric Doppler negative for significant stenosis.  Her abdominal pain has resolved as of today.     4.  A-fib on Eliquis, now being held.  On 2/9/2023 she will been off of Eliquis for 5 days.     5.  CAD on Plavix, now being held. On 2/9/2023 she will been off of Plavix for 5 days.    6.  Myeloproliferative disorder: With significantly elevated WBC     7.  Normal MRI biliary ducts: Minimal intrahepatic prominence     8.  Elevated liver enzymes: Predominantly alkaline phosphatase.  Normal bilirubin.  Nonobstructive pattern.        Plan:  • Hematology evaluation noted   • The patient is agreeing to have an EGD and push enteroscopy with colonoscopy after anticoagulation has been cleared from her system.  On 2/9/2023 she should be able to have an EGD, small bowel enteroscopy, and colonoscopy.  We discussed the pros and cons of doing this an 82-year-old female in she seems to be agreeable to this.  She said her daughter wants this to be done.    I discussed the patients findings and my recommendations with patient.    Tony Santos MD

## 2023-02-07 NOTE — PROGRESS NOTES
Name: Catherine Boyer ADMIT: 2023   : 1940  PCP: Kristi Moreau APRN    MRN: [id number] LOS: 3 days   AGE/SEX: 82 y.o. female  ROOM: 19/     Subjective   Subjective   No abdominal pain today.  She is tolerating p.o. diet.  No further bleeding.    Objective   Objective   Vital Signs  Temp:  [97 °F (36.1 °C)-98.9 °F (37.2 °C)] 98.7 °F (37.1 °C)  Heart Rate:  [] 102  Resp:  [16-18] 18  BP: ()/(48-68) 105/52  SpO2:  [94 %-96 %] 96 %  on  Flow (L/min):  [2] 2;   Device (Oxygen Therapy): nasal cannula  Body mass index is 27.78 kg/m².  Physical Exam  Constitutional:       General: She is not in acute distress.     Appearance: Normal appearance.   Cardiovascular:      Rate and Rhythm: Normal rate and regular rhythm.      Pulses: Normal pulses.      Heart sounds: No murmur heard.    No gallop.   Pulmonary:      Effort: Pulmonary effort is normal. No respiratory distress.      Breath sounds: Normal breath sounds.   Abdominal:      General: Abdomen is flat. Bowel sounds are normal. There is no distension.      Palpations: Abdomen is soft.      Tenderness: There is no abdominal tenderness. There is no guarding.   Musculoskeletal:      Right lower leg: No edema.      Left lower leg: No edema.   Skin:     General: Skin is warm.   Neurological:      General: No focal deficit present.      Mental Status: She is alert and oriented to person, place, and time.   Psychiatric:         Mood and Affect: Mood normal.         Results Review     I reviewed the patient's new clinical results.  Results from last 7 days   Lab Units 23  0339 23  0023 23  1604 23  0323 23  0806 23  0356 23  2335 23  1419   WBC 10*3/mm3 49.82*  --   --  43.11*  --  49.85*  --  56.36*   HEMOGLOBIN g/dL 9.0* 8.6* 9.4* 8.6*   < > 7.1*   < > 6.1*   PLATELETS 10*3/mm3 253  --   --  305  --  334  --  385    < > = values in this interval not displayed.     Results from last 7 days   Lab Units  02/07/23  0339 02/06/23  0323 02/05/23  0356 02/04/23  1419   SODIUM mmol/L 139 136 136 136   POTASSIUM mmol/L 3.0* 2.8* 3.6 4.3   CHLORIDE mmol/L 101 99 104 100   CO2 mmol/L 26.2 26.9 24.1 26.0   BUN mg/dL 6* 10 11 13   CREATININE mg/dL 0.73 0.66 0.71 0.76   GLUCOSE mg/dL 90 59* 75 119*   EGFR mL/min/1.73 81.2 86.6 84.0 77.4     Results from last 7 days   Lab Units 02/05/23  0356 02/04/23  1419   ALBUMIN g/dL 3.7 4.1   BILIRUBIN mg/dL 0.9 0.6   ALK PHOS U/L 171* 194*   AST (SGOT) U/L 23 35*   ALT (SGPT) U/L 41* 52*     Results from last 7 days   Lab Units 02/07/23  0339 02/06/23 0323 02/05/23  0356 02/04/23  1419   CALCIUM mg/dL 8.2* 7.8* 8.2* 8.6   ALBUMIN g/dL  --   --  3.7 4.1   MAGNESIUM mg/dL  --   --  2.0  --        Glucose   Date/Time Value Ref Range Status   02/07/2023 0617 108 70 - 130 mg/dL Final     Comment:     Meter: [id number] : [id number] Iam Jodi NA   02/06/2023 2051 112 70 - 130 mg/dL Final     Comment:     Meter: [id number] : [id number] Iam Jodi NA   02/06/2023 1611 111 70 - 130 mg/dL Final     Comment:     Meter: [id number] : [id number] Su Cloey NA   02/06/2023 1116 106 70 - 130 mg/dL Final     Comment:     Meter: [id number] : [id number] Su Rebekaey NA   02/06/2023 0542 96 70 - 130 mg/dL Final     Comment:     Meter: [id number] : [id number] Yuly Tia NA   02/06/2023 0503 73 70 - 130 mg/dL Final     Comment:     Meter: [id number] : [id number] Yuly BENTLEY   02/05/2023 2136 100 70 - 130 mg/dL Final     Comment:     Meter: [id number] : [id number] Yuly BENTLEY       Duplex Mesenteric Complete CAR    Addendum Date: 2/6/2023    •  No hemodynamically significant stenosis of the celiac artery is noted. •  No hemodynamically significant stenosis of the superior mesenteric artery (SMA) is noted. •  Inferior mesenteric artery is patent     Scheduled Medications  atorvastatin, 80 mg, Oral, Nightly  empagliflozin, 10 mg, Oral, Daily  furosemide, 40 mg, Oral,  Daily  insulin lispro, 0-7 Units, Subcutaneous, TID AC  latanoprost, 1 drop, Both Eyes, Nightly  metoprolol succinate XL, 25 mg, Oral, Q12H  nystatin, 5 mL, Oral, 4x Daily    Infusions  pantoprazole, 8 mg/hr, Last Rate: 8 mg/hr (02/07/23 0239)    Diet  Diet: Liquid Diets; Full Liquid; Texture: Regular Texture (IDDSI 7); Fluid Consistency: Thin (IDDSI 0)       Assessment/Plan     Active Hospital Problems    Diagnosis  POA   • **ABLA (acute blood loss anemia) [D62]  Yes   • Hypokalemia [E87.6]  Unknown   • Atherosclerosis of abdominal aorta (HCC) [I70.0]  Yes   • Chronic diastolic (congestive) heart failure (HCC) [I50.32]  Yes   • Personal history of transient ischemic attack (TIA), 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 deficits [Z86.73]  Not Applicable   • CAD (coronary artery disease) [I25.10]  Yes   • Myeloproliferative disorder (HCC) [D47.1]  Yes   • Paroxysmal atrial fibrillation (HCC) [I48.0]  Yes   • Type 2 diabetes mellitus with circulatory disorder, 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       82 y.o. female admitted with ABLA (acute blood loss anemia).      02/07/23  Plan for bidirectional endoscopy 2/9/2023.    Acute blood loss anemia  Melena  -Hb 6.1 OA > 8.6 > 9  -GI following-planning for bidirectional endoscopy on 2/9/2023, waiting for Plavix to clear system    Chronic diastolic heart failure  -TTE (9/1/22): EF 57%   -Cardiology following  -Continue Lasix 40 mg    DM2  -Last A1c 6.7 (11/28/22)  -resume Jardiance  -SSI    Myeloproliferative disorder  Anemia, macrocytic  Leukocytosis  -On Jakafi 15 mg twice daily-holding given association with hemorrhage  -Hematology following    CAD  -s/p 2 LEFTY 2/2022; LEFTY to diagonal a. 9/2/22 for ISR  -continue atorvastatin 80mg  -Plavix on hold for above  -No ASA given apixaban     Paroxysmal Afib  -RC: Metoprolol 25 mg  -AC: Apixaban, on hold      HTN  -holding home metoprolol  25mg BID    Hypokalemia  -replete    · Eliquis (home med) for DVT prophylaxis ON HOLD  · Full code.  · Discussed with patient and care team on multidisciplinary rounds.  · Anticipate discharge Return to [facility] when cleared by consultants      Anthony Muñoz MD  [facility]  02/07/23  07:15 EST

## 2023-02-07 NOTE — PLAN OF CARE
Problem: Adult Inpatient Plan of Care  Goal: Absence of Hospital-Acquired Illness or Injury  Intervention: Identify and Manage Fall Risk  Recent Flowsheet Documentation  Taken 2/7/2023 1800 by Orion Sanchez RN  Safety Promotion/Fall Prevention:   activity supervised   assistive device/personal items within reach   room organization consistent   safety round/check completed  Taken 2/7/2023 1631 by Orion Sanchez RN  Safety Promotion/Fall Prevention:   activity supervised   assistive device/personal items within reach   room organization consistent   safety round/check completed  Taken 2/7/2023 1425 by Orion Sanchez RN  Safety Promotion/Fall Prevention:   activity supervised   room organization consistent  Taken 2/7/2023 0958 by Orion Sanchez RN  Safety Promotion/Fall Prevention:   activity supervised   assistive device/personal items within reach   room organization consistent   safety round/check completed     Problem: Adult Inpatient Plan of Care  Goal: Absence of Hospital-Acquired Illness or Injury  Intervention: Prevent Skin Injury  Recent Flowsheet Documentation  Taken 2/7/2023 1800 by Orion Sanchez RN  Body Position:   turned   30 degrees   legs elevated  Taken 2/7/2023 1631 by Orion Sanchez RN  Body Position: dangle, side of bed  Taken 2/7/2023 1425 by Orion Sanchez RN  Body Position:   turned   right   lower extremity elevated   tilted   position changed independently  Taken 2/7/2023 0958 by Orion Sanchez RN  Body Position:   left   position changed independently   Goal Outcome Evaluation:           Progress: improving  Outcome Evaluation: patient tolerated well diet when to the bathroom after and have a bowel movement moderated soft and pass gas, VSS continue O2 @ 2 lt/min stop protonix drip will change to PO.

## 2023-02-07 NOTE — NURSING NOTE
Updated Ms. Thorpe daughter regarding the condition of patient and  Procedures that she need; let her know that  MD  will call her . Ms Thorpe state will appreciated to speak with MD to clarify about the procedure her mom need it.

## 2023-02-07 NOTE — PROGRESS NOTES
"    Patient Name: Catherine Boyer  :1940  82 y.o.      Patient Care Team:  Kristi Moreau APRN as PCP - General (Nurse Practitioner)  Gerard Foreman MD as Referring Physician (Medical Oncology)  Darrell Reinoso MD as Consulting Physician (Hematology and Oncology)  Albin Barriga MD as Consulting Physician (Cardiology)  Richard Aldana, RN as     Chief Complaint: follow up atrial fibrillation, GIB, coronary artery disease    Interval History:   Scopes planned for 3/9/2023. She is resting in bed. HR is in the 80's.     Objective   Vital Signs  Temp:  [97.5 °F (36.4 °C)-98.9 °F (37.2 °C)] 98.2 °F (36.8 °C)  Heart Rate:  [] 93  Resp:  [16-18] 18  BP: ()/(48-68) 107/56    Intake/Output Summary (Last 24 hours) at 2023 1327  Last data filed at 2023 0800  Gross per 24 hour   Intake 840 ml   Output --   Net 840 ml     Flowsheet Rows    Flowsheet Row First Filed Value   Admission Height 154.9 cm (61\") Documented at 2023 1422   Admission Weight 66.7 kg (147 lb) Documented at 2023 1422          Physical Exam:   General Appearance:    Alert, cooperative, in no acute distress   Lungs:     Clear to auscultation.  Normal respiratory effort and rate.      Heart:    Regular rhythm and normal rate, normal S1 and S2, no murmurs, gallops or rubs.     Chest Wall:    No abnormalities observed   Abdomen:     Soft, nontender, positive bowel sounds.     Extremities:   no cyanosis, clubbing or edema.  No marked joint deformities.  Adequate musculoskeletal strength.       Results Review:    Results from last 7 days   Lab Units 23  0339   SODIUM mmol/L 139   POTASSIUM mmol/L 3.0*   CHLORIDE mmol/L 101   CO2 mmol/L 26.2   BUN mg/dL 6*   CREATININE mg/dL 0.73   GLUCOSE mg/dL 90   CALCIUM mg/dL 8.2*         Results from last 7 days   Lab Units 23  0339   WBC 10*3/mm3 49.82*   HEMOGLOBIN g/dL 9.0*   HEMATOCRIT % 27.2*   PLATELETS 10*3/mm3 253     Results from last 7 days   Lab Units " 02/04/23  1419   INR  1.16*   APTT seconds 41.2*     Results from last 7 days   Lab Units 02/05/23  0356   MAGNESIUM mg/dL 2.0                   Medication Review:   atorvastatin, 80 mg, Oral, Nightly  empagliflozin, 10 mg, Oral, Daily  furosemide, 40 mg, Oral, Daily  insulin lispro, 0-7 Units, Subcutaneous, TID AC  latanoprost, 1 drop, Both Eyes, Nightly  metoprolol succinate XL, 25 mg, Oral, Q12H  nystatin, 5 mL, Oral, 4x Daily  sertraline, 50 mg, Oral, Daily         pantoprazole, 8 mg/hr, Last Rate: 8 mg/hr (02/07/23 1300)        Assessment & Plan   1. Acute blood loss anemia , GI following. Apixaban and clopidogrel held currently. Plan for scopes after 5 days off clopidogrel.    2. Coronary artery disease status post stent placement to the diagonal in February 2022 then restenosis in September 2022 requiring repeat stenting. She has class I indication for antiplatelet therapy if safe moving forward from a GI standpoint.    3. Paroxysmal atrial fibrillation, now in AF. HR mildly tachy. Now back on home beta blocker and HR better.  Apixaban on hold. CHADs 2 Vasc score of 9, thus high risk of CVA off anticoagulation.     4. Hypertension    5. Chronic HFpEF - continue oral lasix. Watch for volume overload.    6. Nonbacterial thrombotic endocarditis, diagnosed June 2021 and stable.    Apixaban and plavix remain on hold. Plans for scopes on 2/9 noted. Will see again post scopes.     ABEL Carbajal  [facility]  02/07/23  13:27 EST

## 2023-02-07 NOTE — PLAN OF CARE
Goal Outcome Evaluation:         Pt A&Ox4, 2L n/c continuous (only wears at HS at baseline), Afib controlled to NSR, turns self in bed, IV protonix drip continuous, assist x1 to BSC as needed, treated headache with PRN Tylenol this shift with good results.  Plan to be off Eliquis for additional days (02/09/23 will be 5 days) and then do EGD/scope due to this admission had presented with dk stool and anemia.        Problem: Adult Inpatient Plan of Care  Goal: Plan of Care Review  Outcome: Ongoing, Progressing  Goal: Absence of Hospital-Acquired Illness or Injury  Outcome: Ongoing, Progressing  Intervention: Identify and Manage Fall Risk  Description: Perform standard risk assessment on admission using a validated tool or comprehensive approach appropriate to the patient; reassess fall risk frequently, with change in status or transfer to another level of care.  Communicate fall injury risk to interprofessional healthcare team.  Determine need for increased observation, equipment and environmental modification, such as low bed, signage and supportive, nonskid footwear.  Adjust safety measures to individual developmental age, stage and identified risk factors.  Reinforce the importance of safety and physical activity with patient and family.  Perform regular intentional rounding to assess need for position change, pain assessment and personal needs, including assistance with toileting.  Recent Flowsheet Documentation  Taken 2/7/2023 0600 by Rebecca Kapadia, RN  Safety Promotion/Fall Prevention: safety round/check completed  Taken 2/7/2023 0400 by Rebecca Kapadia, RN  Safety Promotion/Fall Prevention: safety round/check completed  Taken 2/7/2023 0200 by Rebecca Kapadia, RN  Safety Promotion/Fall Prevention: safety round/check completed  Taken 2/6/2023 2346 by Rebecca Kapadia, RN  Safety Promotion/Fall Prevention: safety round/check completed  Taken 2/6/2023 2200 by Rebecca Kapadia, RN  Safety Promotion/Fall  Prevention: safety round/check completed  Taken 2/6/2023 2009 by Rebecca Kapadia RN  Safety Promotion/Fall Prevention: safety round/check completed  Intervention: Prevent Skin Injury  Description: Perform a screening for skin injury risk, such as pressure or moisture associated skin damage on admission and at regular intervals throughout hospital stay.  Keep all areas of skin (especially folds) clean and dry.  Maintain adequate skin hydration.  Relieve and redistribute pressure and protect bony prominences; implement measures based on patient-specific risk factors.  Match turning and repositioning schedule to clinical condition.  Encourage weight shift frequently; assist with reposition if unable to complete independently.  Float heels off bed; avoid pressure on the Achilles tendon.  Keep skin free from extended contact with medical devices.  Encourage functional activity and mobility, as early as tolerated.  Use aids (e.g., slide boards, mechanical lift) during transfer.  Recent Flowsheet Documentation  Taken 2/7/2023 0600 by Rebecca Kapadia RN  Body Position:   position changed independently   left   side-lying  Taken 2/7/2023 0400 by Rebecca Kapadia RN  Body Position:   position changed independently   right   side-lying  Taken 2/7/2023 0200 by Rebecca Kapadia RN  Body Position:   position changed independently   side-lying   left  Taken 2/6/2023 2346 by Rebecca Kapadia RN  Body Position:   position changed independently   supine, legs elevated  Skin Protection:   adhesive use limited   tubing/devices free from skin contact   transparent dressing maintained  Taken 2/6/2023 2200 by Rebecca Kapadia RN  Body Position:   position changed independently   side-lying   left  Taken 2/6/2023 2009 by Rebecca Kapadia RN  Body Position:   position changed independently   supine, legs elevated  Skin Protection:   adhesive use limited   incontinence pads utilized   tubing/devices free from skin contact    transparent dressing maintained  Intervention: Prevent and Manage VTE (Venous Thromboembolism) Risk  Description: Assess for VTE (venous thromboembolism) risk.  Encourage and assist with early ambulation.  Initiate and maintain compression or other therapy, as indicated, based on identified risk in accordance with organizational protocol and provider order.  Encourage both active and passive leg exercises while in bed, if unable to ambulate.  Recent Flowsheet Documentation  Taken 2/6/2023 2346 by Rebecca Kapadia, RN  Activity Management: activity adjusted per tolerance  Taken 2/6/2023 2009 by Rebecca Kapadia, RN  Activity Management: activity adjusted per tolerance  Intervention: Prevent Infection  Description: Maintain skin and mucous membrane integrity; promote hand, oral and pulmonary hygiene.  Optimize fluid balance, nutrition, sleep and glycemic control to maximize infection resistance.  Identify potential sources of infection early to prevent or mitigate progression of infection (e.g., wound, lines, devices).  Evaluate ongoing need for invasive devices; remove promptly when no longer indicated.  Recent Flowsheet Documentation  Taken 2/7/2023 0600 by Rebecca Kapadia, RN  Infection Prevention:   rest/sleep promoted   single patient room provided   visitors restricted/screened   personal protective equipment utilized   hand hygiene promoted  Taken 2/7/2023 0400 by Rebecca Kapadia, RN  Infection Prevention:   rest/sleep promoted   single patient room provided   visitors restricted/screened   hand hygiene promoted   personal protective equipment utilized  Taken 2/7/2023 0200 by Rebecca Kapadia, RN  Infection Prevention:   single patient room provided   rest/sleep promoted   visitors restricted/screened   personal protective equipment utilized   hand hygiene promoted  Taken 2/6/2023 2346 by Rebecca Kapadia, RN  Infection Prevention:   visitors restricted/screened   personal protective equipment utilized    rest/sleep promoted   single patient room provided   hand hygiene promoted  Taken 2/6/2023 2200 by Rebecca Kapadia, RN  Infection Prevention:   single patient room provided   visitors restricted/screened   rest/sleep promoted   personal protective equipment utilized   hand hygiene promoted  Taken 2/6/2023 2009 by Rebecca Kapadia RN  Infection Prevention:   rest/sleep promoted   visitors restricted/screened   single patient room provided   hand hygiene promoted   personal protective equipment utilized  Goal: Optimal Comfort and Wellbeing  Outcome: Ongoing, Progressing  Intervention: Monitor Pain and Promote Comfort  Description: Assess pain level, treatment efficacy and patient response at regular intervals using a consistent pain scale.  Consider the presence and impact of preexisting chronic pain.  Encourage patient and caregiver involvement in pain assessment, interventions and safety measures.  Recent Flowsheet Documentation  Taken 2/6/2023 2009 by Rebecca Kapadia RN  Pain Management Interventions:   care clustered   diversional activity provided   pillow support provided   position adjusted   quiet environment facilitated   see MAR  Intervention: Provide Person-Centered Care  Description: Use a family-focused approach to care.  Develop trust and rapport by proactively providing information, encouraging questions, addressing concerns and offering reassurance.  Acknowledge emotional response to hospitalization.  Recognize and utilize personal coping strategies.  Honor spiritual and cultural preferences.  Recent Flowsheet Documentation  Taken 2/6/2023 2346 by Rebecca Kapadia, RN  Trust Relationship/Rapport:   care explained   choices provided   reassurance provided   thoughts/feelings acknowledged   questions encouraged   questions answered  Taken 2/6/2023 2009 by Rebecca Kapadia, RN  Trust Relationship/Rapport:   care explained   choices provided   questions encouraged   thoughts/feelings acknowledged    reassurance provided   empathic listening provided   questions answered  Goal: Readiness for Transition of Care  Outcome: Ongoing, Progressing     Problem: Pain Acute  Goal: Acceptable Pain Control and Functional Ability  Outcome: Ongoing, Progressing  Intervention: Prevent or Manage Pain  Description: Evaluate pain level, effect of treatment and patient response at regular intervals.  Minimize painful stimuli; coordinate care and adjust environment (e.g., light, noise, unnecessary movement); promote sleep/rest.  Match pharmacologic analgesia to severity and type of pain mechanism (e.g., neuropathic, muscle, inflammatory); consider multimodal approach (e.g., nonopioid, opioid, adjuvant).  Provide medication at regular intervals; titrate to patient response; premedicate for painful procedures.  Manage breakthrough pain with additional doses; consider rotation or switching medication.  Monitor for signs of substance tolerance (increased dose to reach desired effect, decreased effect with same dose).  Manage medication-induced effects, such as constipation, nausea, pruritus, urinary retention, somnolence and dizziness.  Provide multimodal interventions, such as as physical activity, therapeutic exercise, yoga, TENS (transcutaneous electrical nerve stimulation) and manual therapy.  Train in functional activity modifications, such as body mechanics, posture, ergonomics, energy conservation and activity pacing.  Consider addition of complementary or alternative therapy, such as acupuncture, hypnosis or therapeutic touch.  Recent Flowsheet Documentation  Taken 2/7/2023 0600 by Rebecca Kapadia RN  Medication Review/Management: medications reviewed  Taken 2/7/2023 0400 by Rebecca Kapadia, RN  Medication Review/Management: medications reviewed  Taken 2/7/2023 0200 by Rebecca Kapadia, RN  Medication Review/Management: medications reviewed  Taken 2/6/2023 2346 by Rebecca Kapadia RN  Sensory Stimulation Regulation:   care  clustered   television on   lighting decreased  Bowel Elimination Promotion: adequate fluid intake promoted  Sleep/Rest Enhancement:   awakenings minimized   consistent schedule promoted   room darkened  Medication Review/Management: medications reviewed  Taken 2/6/2023 2200 by Rebecca Kapadia, RN  Medication Review/Management: medications reviewed  Taken 2/6/2023 2009 by Rebecca Kapadia, RN  Sensory Stimulation Regulation:   care clustered   lighting decreased   television on  Bowel Elimination Promotion: adequate fluid intake promoted  Sleep/Rest Enhancement:   awakenings minimized   consistent schedule promoted   room darkened  Medication Review/Management:   medications reviewed   high-risk medications identified  Intervention: Develop Pain Management Plan  Description: Acknowledge patient as the expert in pain self-management.  Use a consistent, validated tool for pain assessment; include function and quality of life.  Evaluate risk for opioid use and dependence.  Set pain management goals; determine acceptable level of discomfort to allow for maximal functioning.  Determine [id number] pain management plan, including both pharmacologic and nonpharmacologic measures; integrate management of chronic (persistent) pain.  Identify and integrate past successful treatment measures, if able.  Encourage patient and caregiver involvement in pain assessment, interventions and safety measures.  Re-evaluate plan regularly.  Recent Flowsheet Documentation  Taken 2/6/2023 2009 by Rebecca Kapadia, RN  Pain Management Interventions:   care clustered   diversional activity provided   pillow support provided   position adjusted   quiet environment facilitated   see MAR  Intervention: Optimize Psychosocial Wellbeing  Description: Facilitate patient’s self-control over pain by providing pain information and allowing choices in treatment.  Consider and address emotional response to pain.  Explore and promote use of  coping strategies; address barriers to successful coping.  Evaluate and assist with psychosocial, cultural and spiritual factors impacting pain.  Modify pain perception using techniques, such as distraction, mindfulness, guided imagery, meditation or music.  Assess for risk factors for developing chronic pain, such as depression, fear, pain avoidance and pain catastrophizing.  Consider referral for ongoing coping support, such as education, relaxation training and role of thoughts.  Recent Flowsheet Documentation  Taken 2/6/2023 2346 by Reebcca Kapadia, RN  Supportive Measures:   active listening utilized   decision-making supported   verbalization of feelings encouraged  Diversional Activities: television  Taken 2/6/2023 2009 by Rebecca Kapadia, RN  Supportive Measures:   active listening utilized   self-responsibility promoted   verbalization of feelings encouraged  Diversional Activities: television

## 2023-02-07 NOTE — PLAN OF CARE
Goal Outcome Evaluation:           Progress: improving  Outcome Evaluation: improving patient state start tolerating diet no abdominal pain continue with protonix IV minimal assistant with ADL's

## 2023-02-07 NOTE — PLAN OF CARE
Problem: Adult Inpatient Plan of Care  Goal: Absence of Hospital-Acquired Illness or Injury  Intervention: Identify and Manage Fall Risk  Recent Flowsheet Documentation  Taken 2/6/2023 1800 by Orion Sanchez RN  Safety Promotion/Fall Prevention:   activity supervised   assistive device/personal items within reach   room organization consistent   safety round/check completed  Taken 2/6/2023 1444 by Orion Sanchez RN  Safety Promotion/Fall Prevention:   activity supervised   assistive device/personal items within reach   room organization consistent   safety round/check completed  Taken 2/6/2023 1225 by Orion Sanchez RN  Safety Promotion/Fall Prevention:   activity supervised   assistive device/personal items within reach  Taken 2/6/2023 1003 by Orion Sanchez RN  Safety Promotion/Fall Prevention:   activity supervised   assistive device/personal items within reach   room organization consistent   safety round/check completed     Problem: Adult Inpatient Plan of Care  Goal: Absence of Hospital-Acquired Illness or Injury  Intervention: Prevent Skin Injury  Recent Flowsheet Documentation  Taken 2/6/2023 1800 by Orion Sanchez RN  Body Position:   turned   left  Taken 2/6/2023 1444 by Orion Sanchez RN  Body Position:   turned   right   position changed independently  Taken 2/6/2023 1225 by Orion Sanchez RN  Body Position:   turned   right   lower extremity elevated  Taken 2/6/2023 1003 by Orion Sanchez RN  Body Position:   turned   supine   Goal Outcome Evaluation:           Progress: improving  Outcome Evaluation: improving patient state start tolerating diet no abdominal pain continue with protonix IV minimal assistant with ADL's

## 2023-02-08 ENCOUNTER — APPOINTMENT (OUTPATIENT)
Dept: GENERAL RADIOLOGY | Facility: HOSPITAL | Age: 83
DRG: 378 | End: 2023-02-08
Payer: MEDICARE

## 2023-02-08 PROBLEM — E87.6 HYPOKALEMIA: Status: RESOLVED | Noted: 2023-02-07 | Resolved: 2023-02-08

## 2023-02-08 LAB
ANION GAP SERPL CALCULATED.3IONS-SCNC: 9 MMOL/L (ref 5–15)
BASOPHILS # BLD MANUAL: 0.58 10*3/MM3 (ref 0–0.2)
BASOPHILS NFR BLD MANUAL: 1 % (ref 0–1.5)
BUN SERPL-MCNC: 11 MG/DL (ref 8–23)
BUN/CREAT SERPL: 13.1 (ref 7–25)
CALCIUM SPEC-SCNC: 8.2 MG/DL (ref 8.6–10.5)
CHLORIDE SERPL-SCNC: 101 MMOL/L (ref 98–107)
CO2 SERPL-SCNC: 26 MMOL/L (ref 22–29)
CREAT SERPL-MCNC: 0.84 MG/DL (ref 0.57–1)
D DIMER PPP FEU-MCNC: 0.82 MCGFEU/ML (ref 0–0.84)
DEPRECATED RDW RBC AUTO: 50.9 FL (ref 37–54)
EGFRCR SERPLBLD CKD-EPI 2021: 68.6 ML/MIN/1.73
ERYTHROCYTE [DISTWIDTH] IN BLOOD BY AUTOMATED COUNT: 14.6 % (ref 12.3–15.4)
GEN 5 2HR TROPONIN T REFLEX: 30 NG/L
GLUCOSE BLDC GLUCOMTR-MCNC: 101 MG/DL (ref 70–130)
GLUCOSE BLDC GLUCOMTR-MCNC: 121 MG/DL (ref 70–130)
GLUCOSE BLDC GLUCOMTR-MCNC: 134 MG/DL (ref 70–130)
GLUCOSE BLDC GLUCOMTR-MCNC: 200 MG/DL (ref 70–130)
GLUCOSE SERPL-MCNC: 100 MG/DL (ref 65–99)
HCT VFR BLD AUTO: 27.4 % (ref 34–46.6)
HGB BLD-MCNC: 9.2 G/DL (ref 12–15.9)
LYMPHOCYTES # BLD MANUAL: 1.74 10*3/MM3 (ref 0.7–3.1)
LYMPHOCYTES NFR BLD MANUAL: 1 % (ref 5–12)
MCH RBC QN AUTO: 32.4 PG (ref 26.6–33)
MCHC RBC AUTO-ENTMCNC: 33.6 G/DL (ref 31.5–35.7)
MCV RBC AUTO: 96.5 FL (ref 79–97)
METAMYELOCYTES NFR BLD MANUAL: 3 % (ref 0–0)
MONOCYTES # BLD: 0.58 10*3/MM3 (ref 0.1–0.9)
MYELOCYTES NFR BLD MANUAL: 1 % (ref 0–0)
NEUTROPHILS # BLD AUTO: 52.73 10*3/MM3 (ref 1.7–7)
NEUTROPHILS NFR BLD MANUAL: 91 % (ref 42.7–76)
NRBC BLD AUTO-RTO: 0.1 /100 WBC (ref 0–0.2)
OVALOCYTES BLD QL SMEAR: ABNORMAL
PLATELET # BLD AUTO: 220 10*3/MM3 (ref 140–450)
PMV BLD AUTO: 11.9 FL (ref 6–12)
POTASSIUM SERPL-SCNC: 3.9 MMOL/L (ref 3.5–5.2)
QT INTERVAL: 271 MS
QT INTERVAL: 342 MS
RBC # BLD AUTO: 2.84 10*6/MM3 (ref 3.77–5.28)
SMALL PLATELETS BLD QL SMEAR: ADEQUATE
SODIUM SERPL-SCNC: 136 MMOL/L (ref 136–145)
TROPONIN T DELTA: 0 NG/L
TROPONIN T SERPL HS-MCNC: 30 NG/L
VARIANT LYMPHS NFR BLD MANUAL: 3 % (ref 19.6–45.3)
WBC MORPH BLD: NORMAL
WBC NRBC COR # BLD: 57.95 10*3/MM3 (ref 3.4–10.8)

## 2023-02-08 PROCEDURE — 63710000001 INSULIN LISPRO (HUMAN) PER 5 UNITS: Performed by: INTERNAL MEDICINE

## 2023-02-08 PROCEDURE — 99231 SBSQ HOSP IP/OBS SF/LOW 25: CPT | Performed by: INTERNAL MEDICINE

## 2023-02-08 PROCEDURE — 99232 SBSQ HOSP IP/OBS MODERATE 35: CPT | Performed by: INTERNAL MEDICINE

## 2023-02-08 PROCEDURE — 71045 X-RAY EXAM CHEST 1 VIEW: CPT

## 2023-02-08 PROCEDURE — 93005 ELECTROCARDIOGRAM TRACING: CPT | Performed by: STUDENT IN AN ORGANIZED HEALTH CARE EDUCATION/TRAINING PROGRAM

## 2023-02-08 PROCEDURE — 93010 ELECTROCARDIOGRAM REPORT: CPT | Performed by: STUDENT IN AN ORGANIZED HEALTH CARE EDUCATION/TRAINING PROGRAM

## 2023-02-08 PROCEDURE — 85025 COMPLETE CBC W/AUTO DIFF WBC: CPT | Performed by: INTERNAL MEDICINE

## 2023-02-08 PROCEDURE — 93010 ELECTROCARDIOGRAM REPORT: CPT | Performed by: INTERNAL MEDICINE

## 2023-02-08 PROCEDURE — 84484 ASSAY OF TROPONIN QUANT: CPT | Performed by: STUDENT IN AN ORGANIZED HEALTH CARE EDUCATION/TRAINING PROGRAM

## 2023-02-08 PROCEDURE — 80048 BASIC METABOLIC PNL TOTAL CA: CPT | Performed by: INTERNAL MEDICINE

## 2023-02-08 PROCEDURE — 85379 FIBRIN DEGRADATION QUANT: CPT | Performed by: STUDENT IN AN ORGANIZED HEALTH CARE EDUCATION/TRAINING PROGRAM

## 2023-02-08 PROCEDURE — 82962 GLUCOSE BLOOD TEST: CPT

## 2023-02-08 PROCEDURE — 85007 BL SMEAR W/DIFF WBC COUNT: CPT | Performed by: INTERNAL MEDICINE

## 2023-02-08 RX ORDER — NYSTATIN 100000 [USP'U]/G
POWDER TOPICAL EVERY 12 HOURS SCHEDULED
Status: DISCONTINUED | OUTPATIENT
Start: 2023-02-08 | End: 2023-02-12 | Stop reason: HOSPADM

## 2023-02-08 RX ADMIN — NYSTATIN 500000 UNITS: 100000 SUSPENSION ORAL at 17:10

## 2023-02-08 RX ADMIN — EMPAGLIFLOZIN 10 MG: 10 TABLET, FILM COATED ORAL at 10:49

## 2023-02-08 RX ADMIN — METOPROLOL SUCCINATE 25 MG: 25 TABLET, EXTENDED RELEASE ORAL at 09:23

## 2023-02-08 RX ADMIN — METOPROLOL SUCCINATE 25 MG: 25 TABLET, EXTENDED RELEASE ORAL at 20:42

## 2023-02-08 RX ADMIN — NYSTATIN 500000 UNITS: 100000 SUSPENSION ORAL at 11:51

## 2023-02-08 RX ADMIN — NYSTATIN 500000 UNITS: 100000 SUSPENSION ORAL at 20:43

## 2023-02-08 RX ADMIN — FUROSEMIDE 40 MG: 40 TABLET ORAL at 09:23

## 2023-02-08 RX ADMIN — ATORVASTATIN CALCIUM 80 MG: 80 TABLET, FILM COATED ORAL at 20:43

## 2023-02-08 RX ADMIN — ACETAMINOPHEN 650 MG: 325 TABLET, FILM COATED ORAL at 09:22

## 2023-02-08 RX ADMIN — POLYETHYLENE GLYCOL 3350, SODIUM SULFATE ANHYDROUS, SODIUM BICARBONATE, SODIUM CHLORIDE, POTASSIUM CHLORIDE 2000 ML: 236; 22.74; 6.74; 5.86; 2.97 POWDER, FOR SOLUTION ORAL at 20:45

## 2023-02-08 RX ADMIN — INSULIN LISPRO 3 UNITS: 100 INJECTION, SOLUTION INTRAVENOUS; SUBCUTANEOUS at 11:51

## 2023-02-08 RX ADMIN — METOPROLOL TARTRATE 5 MG: 1 INJECTION, SOLUTION INTRAVENOUS at 19:56

## 2023-02-08 RX ADMIN — PANTOPRAZOLE SODIUM 40 MG: 40 TABLET, DELAYED RELEASE ORAL at 06:38

## 2023-02-08 RX ADMIN — SERTRALINE 50 MG: 50 TABLET, FILM COATED ORAL at 09:23

## 2023-02-08 RX ADMIN — LATANOPROST 1 DROP: 50 SOLUTION OPHTHALMIC at 20:44

## 2023-02-08 RX ADMIN — NYSTATIN 500000 UNITS: 100000 SUSPENSION ORAL at 09:23

## 2023-02-08 NOTE — PLAN OF CARE
Goal Outcome Evaluation:           Progress: improving  Outcome Evaluation: VSS. 2L while sleeping only. Had one BM this shift with no signs of bleed. Plans for endoscopic procedures on Thursday 2/9

## 2023-02-08 NOTE — PROGRESS NOTES
[facility]  Inpatient Progress Note    Reason for Follow Up: Abdominal pain, reports of melenic stools, anemia    Subjective     Interval History:   She says she had a bad night, she got short of breath, work-up was negative.  She says she feels better this morning.  Wanting to have scopes.    Current Facility-Administered Medications:   •  acetaminophen (TYLENOL) tablet 650 mg, 650 mg, Oral, Q4H PRN, Jaimee Macias MD, 650 mg at 02/08/23 0922  •  atorvastatin (LIPITOR) tablet 80 mg, 80 mg, Oral, Nightly, Jaimee Macias MD, 80 mg at 02/07/23 2050  •  dextrose (D50W) (25 g/50 mL) IV injection 25 g, 25 g, Intravenous, Q15 Min PRN, Orlando Sewell MD  •  dextrose (GLUTOSE) oral gel 15 g, 15 g, Oral, Q15 Min PRN, Orlando Sewell MD  •  empagliflozin (JARDIANCE) tablet 10 mg, 10 mg, Oral, Daily, Jaimee Macias MD, 10 mg at 02/08/23 1049  •  furosemide (LASIX) tablet 40 mg, 40 mg, Oral, Daily, Jaimee Macias MD, 40 mg at 02/08/23 0923  •  glucagon (GLUCAGEN) injection 1 mg, 1 mg, Intramuscular, Q15 Min PRN, Orlando Sewell MD  •  insulin lispro (ADMELOG) injection 0-7 Units, 0-7 Units, Subcutaneous, TID AC, Orlando Sewell MD, 3 Units at 02/08/23 1151  •  latanoprost (XALATAN) 0.005 % ophthalmic solution 1 drop, 1 drop, Both Eyes, Nightly, Jaimee Macias MD, 1 drop at 02/07/23 2048  •  melatonin tablet 3 mg, 3 mg, Oral, Nightly PRN, Jaimee Macias MD, 3 mg at 02/06/23 2350  •  metoprolol succinate XL (TOPROL-XL) 24 hr tablet 25 mg, 25 mg, Oral, Q12H, Juli Brandt APRN, 25 mg at 02/08/23 0923  •  nystatin (MYCOSTATIN) 100,000 unit/mL suspension 500,000 Units, 5 mL, Oral, 4x Daily, Charles Diaz MD, 500,000 Units at 02/08/23 1151  •  ondansetron (ZOFRAN) tablet 4 mg, 4 mg, Oral, Q6H PRN **OR** ondansetron (ZOFRAN) injection 4 mg, 4 mg, Intravenous, Q6H PRN, Jaimee Macias MD  •  pantoprazole (PROTONIX) EC tablet 40  mg, 40 mg, Oral, Q AM, Tony Santos MD, 40 mg at 02/08/23 0638  •  polyethylene glycol (GoLYTELY) solution 2,000 mL, 2,000 mL, Oral, Q8H, Sarah Miles MD  •  potassium chloride (K-DUR,KLOR-CON) ER tablet 40 mEq, 40 mEq, Oral, PRN, 40 mEq at 02/07/23 1607 **OR** potassium chloride (KLOR-CON) packet 40 mEq, 40 mEq, Oral, PRN **OR** potassium chloride 10 mEq in 100 mL IVPB, 10 mEq, Intravenous, Q1H PRN, Faustina Barcenas APRN  •  sertraline (ZOLOFT) tablet 50 mg, 50 mg, Oral, Daily, HoskingAnthony MD, 50 mg at 02/08/23 0923  •  [COMPLETED] Insert Peripheral IV, , , Once **AND** sodium chloride 0.9 % flush 10 mL, 10 mL, Intravenous, PRN, Jamel Emmanuel II, MD, 10 mL at 02/06/23 2010  Review of Systems:   All systems reviewed and negative except for: Pulmonary: Shortness of breath, resolved      Objective     Vital Signs  Temp:  [98 °F (36.7 °C)-99.1 °F (37.3 °C)] 99.1 °F (37.3 °C)  Heart Rate:  [] 91  Resp:  [16-18] 18  BP: (105-116)/(50-58) 116/58  Body mass index is 27.78 kg/m².    Intake/Output Summary (Last 24 hours) at 2/8/2023 1218  Last data filed at 2/8/2023 0920  Gross per 24 hour   Intake 907.33 ml   Output 300 ml   Net 607.33 ml     I/O this shift:  In: -   Out: 300 [Urine:300]     Physical Exam:   General: patient awake, alert and cooperative, appears stated age, elderly, hard of hearing   Eyes: Normal lids and lashes, no scleral icterus   Neck: supple, normal ROM   Skin: warm and dry, not jaundiced   Cardiovascular: regular rhythm and rate, no murmurs auscultated   Pulm: clear to auscultation bilaterally, regular and unlabored   Abdomen: soft, nontender, nondistended; normal bowel sounds   Rectal: deferred   Extremities: no rash or edema   Psychiatric: Normal mood and behavior; memory intact     Results Review:     I reviewed the patient's new clinical results.    Results from last 7 days   Lab Units 02/08/23  0406 02/07/23  0339 02/07/23  0023 02/06/23  1604 02/06/23  0323   WBC  10*3/mm3 57.95* 49.82*  --   --  43.11*   HEMOGLOBIN g/dL 9.2* 9.0* 8.6*   < > 8.6*   HEMATOCRIT % 27.4* 27.2* 27.1*   < > 25.9*   PLATELETS 10*3/mm3 220 253  --   --  305    < > = values in this interval not displayed.     Results from last 7 days   Lab Units 02/08/23  0406 02/07/23 2022 02/07/23  0339 02/06/23  0323 02/05/23  0356 02/04/23  1419   SODIUM mmol/L 136  --  139 136 136 136   POTASSIUM mmol/L 3.9 4.4 3.0* 2.8* 3.6 4.3   CHLORIDE mmol/L 101  --  101 99 104 100   CO2 mmol/L 26.0  --  26.2 26.9 24.1 26.0   BUN mg/dL 11  --  6* 10 11 13   CREATININE mg/dL 0.84  --  0.73 0.66 0.71 0.76   CALCIUM mg/dL 8.2*  --  8.2* 7.8* 8.2* 8.6   BILIRUBIN mg/dL  --   --   --   --  0.9 0.6   ALK PHOS U/L  --   --   --   --  171* 194*   ALT (SGPT) U/L  --   --   --   --  41* 52*   AST (SGOT) U/L  --   --   --   --  23 35*   GLUCOSE mg/dL 100*  --  90 59* 75 119*     Results from last 7 days   Lab Units 02/04/23  1419   INR  1.16*     Lab Results   Lab Value Date/Time    LIPASE 10 (L) 02/04/2023 1419    LIPASE 13 01/23/2023 0216    LIPASE 20 11/21/2022 0250    LIPASE 38 08/31/2022 2131    LIPASE 16.0 02/09/2022 0525    LIPASE 50 06/29/2021 1131       Radiology:  XR Chest 1 View         CT Abdomen Pelvis With Contrast   Final Result   1. No evidence for acute abnormality of the abdomen or pelvis.   2. Small hiatal hernia.   3. 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 Suspect small left   renal cyst.   4. Previous cholecystectomy.   5. Sigmoid diverticulosis without evidence for diverticulitis.       Radiation dose reduction techniques were utilized, including automated   exposure control and exposure modulation based on body size.       This report was finalized on 2/4/2023 4:13 PM by Dr. Torey Bedoya M.D.              Assessment & Plan     Patient Active Problem List   Diagnosis   • CAD (coronary artery disease)   • Nonbacterial thrombotic endocarditis   • Paroxysmal atrial fibrillation (HCC)   •  Myeloproliferative disorder (HCC)   • Microcytic anemia   • Type 2 diabetes mellitus with circulatory disorder, without long-term current use of insulin (HCC)   • GERD (gastroesophageal reflux disease)   • Hypertension   • Personal history of transient ischemic attack (TIA), 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 deficits   • COVID   • Porcelain gallbladder   • Breast cancer (HCC)   • Chronic diastolic (congestive) heart failure (HCC)   • Cholecystitis   • Urinary tract infection without hematuria, site unspecified   • Epigastric abdominal pain   • Gastritis   • ABLA (acute blood loss anemia)   • Atherosclerosis of abdominal aorta (HCC)       Impression  1.  Melena: With only minimal decline in her hemoglobin following discharge 1/26.  No further episodes    2.  Acute on chronic anemia: Likely related to above plus myeloproliferative disorder.  She has normocytic indices    3.  Generalized abdominal pain    4.  A-fib on Eliquis    5.  CAD on Plavix    6.  Myeloproliferative disorder: With significantly elevated WBC    7.  Normal MRI biliary ducts: Minimal intrahepatic prominence    8.  Elevated liver enzymes: Predominantly alkaline phosphatase.  Normal bilirubin.  Nonobstructive pattern. Negative AMA, elevated GGT    Plan  EGD and colonoscopy tomorrow with Dr. Ferris.  Patient is in agreement with this.  She understands she will have to complete a bowel prep, we discussed this    Follow hemoglobin, transfusion as per admitting    Continue to monitor respiratory status    I discussed the patients findings and my recommendations with patient and nursing staff.    All necessary PPE, including face mask and eye protection, were worn during this encounter.  Hand sanitization was performed both before and after the patient interaction.    Over 25 minutes was spent reviewing the patient's chart and records, in discussion with the patient, in examination of the patient, and in discussion with members of the patient's  medical team.    Sarah Miles MD

## 2023-02-08 NOTE — PROGRESS NOTES
[facility] GROUP INPATIENT PROGRESS NOTE  Subjective     CC: Myeloproliferative disorder      History of present illness:  The patient is a 82 y.o. year old female with medical history significant for JAK2 positive myeloproliferative disorder, perhaps primary myelofibrosis, A-fib on chronic Eliquis, CAD with stents and on Plavix, history of endocarditis and TIA had presented to [facility] ER on 2/4/2023 with abdominal pain and black stools for several days.     Lab work revealed hemoglobin of 6.1, WBC count of 56.36 and platelet count of 385,000.  CT A/P noted no evidence of acute abnormality in the abdomen or pelvis.  Small hiatal hernia.  Previous cholecystectomy.  Sigmoid diverticulosis without evidence of diverticulitis.     Of note, patient was hospitalized around 2 weeks ago with abdominal pain.  MRCP on 1/25/2023 had shown minimal intrahepatic biliary prominence, no CBD/PD dilatation, 5 mm lesion in the body of pancreas possibly representing IPMN.       EGD performed 1/25/2023 had noted mild mucosal changes characterized by slowing in the lower third of the esophagus.  Patchy mild inflammation characterized by congestion and erythema found in the gastric body.  The examined duodenum was normal.     Patient has been seen by GI.  Started on PPI.  Eliquis and Plavix on hold.  Pending clinical course, plan to consider colonoscopy +/- EGD/push enteroscopy.     Hematology/oncology consulted for further evaluation and management.  Patient resting comfortably in bed.  Says she had a BM today which was dark brown in color.  Denies any bleeding from anywhere else.    Initial recommendations include anticoagulation on hold with Eliquis and Plavix, on PPI, Jakafi held, IV iron given, consideration of Hydrea if needed.       Interval history:   2/6/2023  T97.5, pulse 110, respirations 16, BP 97/48, SPO2 94%  Patient, again, states her abdominal pain is now resolved  Patient improved per abdominal  pain.  Again enteroscopy and C-scope with Plavix discontinued for 5 days.  H&H 8.6 and 25.9, white count 43,110, platelet count 305,000    2/7/2023  T90.2, pulse 93, respirations 18,  56  Patient now scheduled for EGD, small bowel enteroscopy and colonoscopy 2/9/2023.  H&H 9.0 and 27.2, white count of 49 820, BUN/creatinine of 6 and 0.73    2/8/2023  T99.1, pulse 90, respirations 18, /58  Record reviewed and endoscopy to proceed to status 9/23, patient agrees  H&H 9.2 and 27.4, white count 56097, platelet count of 220,000           Medications:  The current medication list was reviewed in the EMR.    Allergies:    Allergies   Allergen Reactions   • Aspirin Unknown - Low Severity   • Oxycodone Unknown - Low Severity   • Hydroxyurea Other (See Comments)     Her hemoglobin drop and was stop in February 2022 and start taking Jakafi   • Diphenhydramine Hcl Anxiety and Unknown (See Comments)     Benadryl IVP       Objective      Vitals:    02/08/23 0735   BP:    Pulse: 91   Resp:    Temp:    SpO2: 96%      Physical Exam  Constitutional:       Appearance: Normal appearance.   HENT:      Head: Normocephalic and atraumatic.      Nose: Nose normal.      Mouth/Throat:      Mouth: Mucous membranes are moist.      Pharynx: Oropharynx is clear.   Eyes:      Extraocular Movements: Extraocular movements intact.      Conjunctiva/sclera: Conjunctivae normal.      Pupils: Pupils are equal, round, and reactive to light.   Cardiovascular:      Rate and Rhythm: Normal rate and regular rhythm.      Pulses: Normal pulses.      Heart sounds: Normal heart sounds.   Pulmonary:      Effort: Pulmonary effort is normal.      Breath sounds: Normal breath sounds.   Abdominal:      General: Bowel sounds are normal.      Palpations: Abdomen is soft. There is no mass.   Musculoskeletal:         General: Normal range of motion.      Cervical back: Normal range of motion and neck supple.   Skin:     General: Skin is warm and dry.    Neurological:      General: No focal deficit present.      Mental Status: She is alert and oriented to person, place, and time.   Psychiatric:         Behavior: Behavior normal.       RECENT LABS:    Results from last 7 days   Lab Units 02/07/23  0339 02/07/23  0023 02/06/23  1604 02/06/23  0323 02/05/23  0806 02/05/23  0356 02/04/23  2335 02/04/23  1419   WBC 10*3/mm3 49.82*  --   --  43.11*  --  49.85*  --  56.36*   HEMOGLOBIN g/dL 9.0* 8.6* 9.4* 8.6*   < > 7.1*   < > 6.1*   HEMATOCRIT % 27.2* 27.1* 28.3* 25.9*   < > 21.5*   < > 18.4*   PLATELETS 10*3/mm3 253  --   --  305  --  334  --  385   MONOCYTES % %  --   --   --  1.1*  --   --   --  11.0    < > = values in this interval not displayed.     Results from last 7 days   Lab Units 02/08/23  0406 02/07/23 2022 02/07/23  0339 02/06/23  0323 02/05/23  0356 02/04/23  1419   SODIUM mmol/L 136  --  139 136 136 136   POTASSIUM mmol/L 3.9 4.4 3.0* 2.8* 3.6 4.3   CHLORIDE mmol/L 101  --  101 99 104 100   CO2 mmol/L 26.0  --  26.2 26.9 24.1 26.0   BUN mg/dL 11  --  6* 10 11 13   CREATININE mg/dL 0.84  --  0.73 0.66 0.71 0.76   CALCIUM mg/dL 8.2*  --  8.2* 7.8* 8.2* 8.6   BILIRUBIN mg/dL  --   --   --   --  0.9 0.6   ALK PHOS U/L  --   --   --   --  171* 194*   ALT (SGPT) U/L  --   --   --   --  41* 52*   AST (SGOT) U/L  --   --   --   --  23 35*   GLUCOSE mg/dL 100*  --  90 59* 75 119*     Results from last 7 days   Lab Units 02/05/23  0356   MAGNESIUM mg/dL 2.0       Assessment & Plan     Ms. Boyer is a pleasant 82-year-old female with medical history significant for JAK2 positive myeloproliferative disorder, perhaps primary myelofibrosis, A-fib on chronic Eliquis, CAD with stents and on Plavix admitted with GI bleed.     #GI bleed, likely upper:  • Presented with abdominal pain and black stool.  Hemoglobin 6.1 on presentation.  • EGD performed during last admission on 1/25/2023 had noted mild mucosal changes characterized by slowing in the lower third of the  esophagus.  Patchy mild inflammation characterized by congestion and erythema found in the gastric body.  The examined duodenum was normal.  Multiple biopsies taken.  • GI on board.  Thinks she may have oozed from the prior biopsy site.  • Anticoagulation with Eliquis and Plavix on hold.  On PPI  • GI follow-up- EGD/push enteroscopy and colonoscopy when Eliquis held 5 days.  This is now scheduled to/9/23     #Severe anemia, likely blood loss related in the setting of primary myelofibrosis:  • Hemoglobin 6.1 on admission.  Iron profile showed normal iron level of 63, ferritin of 115 and iron saturation of 12%.  • Hemoglobin did improve to 7.3 after transfusion, however dropped again to 6.8.  Being transfused 1 unit PRBC.  • Continue transfusion to keep hemoglobin > 7.   • Will hold jakafi as it can be associated with anemia and hemorrhage.  • Will administer IV iron as well.  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 Reinoso in our practice.  • She is currently on Jakafi 15 mg BID as outpatient.  • Given some risk of hemorrhage associated with jakafi, will hold hold Jakafi while inpatient.     #Leukocytosis, neutrophilic predominant:   • WBC count 56.36 on admission on 2/4/2023  • This is likely myeloproliferative disease related.  His WBC count has ranged between 30- 50,000 since November 2022.  • Last bone marrow biopsy from October 2022 had shown hypercellular marrow at 95% with involvement by chronic myeloproliferative neoplasm, less than 5% blasts.  Mild reticulin fibrosis.  Decreased iron stores.  Consideration of CMML.    • Will monitor for now.  Consider hydroxyurea if worsening leukocytosis.     #A-fib: Eliquis on hold     #CAD s/p PCI: Plavix on hold.     Recommendations:  -Again, we hold jakafi for now  -IV iron now completed with improvement in  H/H  -Will consider hydroxyurea if further worsening leukocytosis  -Transfuse to keep hemoglobin > 7  -EGD and colonoscopy schedule 2/9/2023  -We will continue to follow                 Venancio Barajas MD  2/8/2023  07:35 EST

## 2023-02-08 NOTE — PROGRESS NOTES
Name: Catherine Boyer ADMIT: 2023   : 1940  PCP: Kristi Moreau APRN    MRN: [id number] LOS: 4 days   AGE/SEX: 82 y.o. female  ROOM: 19/1     Subjective   Subjective   States that she got up to use the restroom last night and felt short of breath, which has persisted this morning.  No other symptoms at this time.    Objective   Objective   Vital Signs  Temp:  [98 °F (36.7 °C)-99.1 °F (37.3 °C)] 99.1 °F (37.3 °C)  Heart Rate:  [] 91  Resp:  [16-18] 18  BP: (105-116)/(50-58) 116/58  SpO2:  [96 %-100 %] 96 %  on  Flow (L/min):  [2-3] 2;   Device (Oxygen Therapy): nasal cannula  Body mass index is 27.78 kg/m².  Physical Exam  Constitutional:       General: She is not in acute distress.     Appearance: Normal appearance.   Cardiovascular:      Rate and Rhythm: Normal rate and regular rhythm.      Pulses: Normal pulses.      Heart sounds: No murmur heard.    No gallop.   Pulmonary:      Effort: Pulmonary effort is normal. No respiratory distress.      Breath sounds: Normal breath sounds.   Abdominal:      General: Abdomen is flat. Bowel sounds are normal. There is no distension.      Palpations: Abdomen is soft.      Tenderness: There is no abdominal tenderness. There is no guarding.   Musculoskeletal:      Right lower leg: No edema.      Left lower leg: No edema.   Skin:     General: Skin is warm.   Neurological:      General: No focal deficit present.      Mental Status: She is alert and oriented to person, place, and time.   Psychiatric:         Mood and Affect: Mood normal.         Results Review     I reviewed the patient's new clinical results.  Results from last 7 days   Lab Units 23  0406 23  0339 23  0023 23  1604 23  0323 23  0806 23  0356   WBC 10*3/mm3 57.95* 49.82*  --   --  43.11*  --  49.85*   HEMOGLOBIN g/dL 9.2* 9.0* 8.6* 9.4* 8.6*   < > 7.1*   PLATELETS 10*3/mm3 220 253  --   --  305  --  334    < > = values in this interval not displayed.      Results from last 7 days   Lab Units 02/08/23  0406 02/07/23 2022 02/07/23 0339 02/06/23 0323 02/05/23  0356   SODIUM mmol/L 136  --  139 136 136   POTASSIUM mmol/L 3.9 4.4 3.0* 2.8* 3.6   CHLORIDE mmol/L 101  --  101 99 104   CO2 mmol/L 26.0  --  26.2 26.9 24.1   BUN mg/dL 11  --  6* 10 11   CREATININE mg/dL 0.84  --  0.73 0.66 0.71   GLUCOSE mg/dL 100*  --  90 59* 75   EGFR mL/min/1.73 68.6  --  81.2 86.6 84.0     Results from last 7 days   Lab Units 02/05/23 0356 02/04/23  1419   ALBUMIN g/dL 3.7 4.1   BILIRUBIN mg/dL 0.9 0.6   ALK PHOS U/L 171* 194*   AST (SGOT) U/L 23 35*   ALT (SGPT) U/L 41* 52*     Results from last 7 days   Lab Units 02/08/23 0406 02/07/23 0339 02/06/23 0323 02/05/23 0356 02/04/23  1419   CALCIUM mg/dL 8.2* 8.2* 7.8* 8.2* 8.6   ALBUMIN g/dL  --   --   --  3.7 4.1   MAGNESIUM mg/dL  --   --   --  2.0  --        Glucose   Date/Time Value Ref Range Status   02/08/2023 0616 101 70 - 130 mg/dL Final     Comment:     Meter: [id number] : [id number] Chikislewis Martinez NA   02/07/2023 2025 131 (H) 70 - 130 mg/dL Final     Comment:     Meter: [id number] : [id number] Chikis Edgeine NA   02/07/2023 1609 115 70 - 130 mg/dL Final     Comment:     Meter: [id number] : [id number] Nirmal Parra CNA   02/07/2023 1139 142 (H) 70 - 130 mg/dL Final     Comment:     Meter: [id number] : [id number] Nirmal Parra CNA   02/07/2023 0617 108 70 - 130 mg/dL Final     Comment:     Meter: [id number] : [id number] Iam Zapata NA   02/06/2023 2051 112 70 - 130 mg/dL Final     Comment:     Meter: [id number] : [id number] Iam Zapata NA   02/06/2023 1611 111 70 - 130 mg/dL Final     Comment:     Meter: [id number] : [id number] Georges BENTLEY       No radiology results for the last day  Scheduled Medications  atorvastatin, 80 mg, Oral, Nightly  empagliflozin, 10 mg, Oral, Daily  furosemide, 40 mg, Oral, Daily  insulin lispro, 0-7 Units, Subcutaneous, TID AC  latanoprost, 1 drop, Both  Eyes, Nightly  metoprolol succinate XL, 25 mg, Oral, Q12H  nystatin, 5 mL, Oral, 4x Daily  pantoprazole, 40 mg, Oral, Q AM  sertraline, 50 mg, Oral, Daily    Infusions   Diet  Diet: Diabetic Diets; Consistent Carbohydrate; Texture: Regular Texture (IDDSI 7); Fluid Consistency: Thin (IDDSI 0)       Assessment/Plan     Active Hospital Problems    Diagnosis  POA   • **ABLA (acute blood loss anemia) [D62]  Yes   • Atherosclerosis of abdominal aorta (HCC) [I70.0]  Yes   • Chronic diastolic (congestive) heart failure (HCC) [I50.32]  Yes   • Personal history of transient ischemic attack (TIA), 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 deficits [Z86.73]  Not Applicable   • CAD (coronary artery disease) [I25.10]  Yes   • Myeloproliferative disorder (HCC) [D47.1]  Yes   • Paroxysmal atrial fibrillation (HCC) [I48.0]  Yes   • Type 2 diabetes mellitus with circulatory disorder, without long-term current use of insulin (HCC) [E11.59]  Yes      Resolved Hospital Problems    Diagnosis Date Resolved POA   • Hypokalemia [E87.6] 02/08/2023 Unknown       82 y.o. female admitted with ABLA (acute blood loss anemia).      02/08/23  Plan for bidirectional endoscopy 2/9/2023.    Acute blood loss anemia  Melena  -Hb 6.1 OA > 8.6 > 9  -GI following-planning for bidirectional endoscopy on 2/9/2023, waiting for Plavix to clear system    Dyspnea  -possibly panic attack; does have prior stents  -D-dimer WNL  -EKG no acute ischemic changes  -Chest x-ray with vascular congestion  -troponin 30 > 30, c/w chronic myocardial injury in setting of HFpEF    Chronic diastolic heart failure  -TTE (9/1/22): EF 57%   -Cardiology following  -Continue Lasix 40 mg    DM2  -Last A1c 6.7 (11/28/22)  -resume Jardiance  -SSI    Myeloproliferative disorder  Anemia, macrocytic  Leukocytosis  -On Jakafi 15 mg twice daily-holding given association with hemorrhage  -Hematology following  -WBC continues to climb, 57 (2/8)    CAD  -s/p 2 LEFTY 2/2022; LEFTY to diagonal a.  9/2/22 for ISR  -continue atorvastatin 80mg  -Plavix on hold for above  -No ASA given apixaban     Paroxysmal Afib  -RC: Metoprolol 25 mg  -AC: Apixaban, on hold      HTN  -metoprolol 25mg BID      · Eliquis (home med) for DVT prophylaxis ON HOLD  · Full code.  · Discussed with patient and care team on multidisciplinary rounds.  · Anticipate discharge Return to [facility] when cleared by consultants      Anthony Muñoz MD  [facility]  02/08/23  10:07 EST

## 2023-02-09 ENCOUNTER — ANESTHESIA (OUTPATIENT)
Dept: GASTROENTEROLOGY | Facility: HOSPITAL | Age: 83
DRG: 378 | End: 2023-02-09
Payer: MEDICARE

## 2023-02-09 ENCOUNTER — ANESTHESIA EVENT (OUTPATIENT)
Dept: GASTROENTEROLOGY | Facility: HOSPITAL | Age: 83
DRG: 378 | End: 2023-02-09
Payer: MEDICARE

## 2023-02-09 ENCOUNTER — APPOINTMENT (OUTPATIENT)
Dept: CT IMAGING | Facility: HOSPITAL | Age: 83
DRG: 378 | End: 2023-02-09
Payer: MEDICARE

## 2023-02-09 LAB
ANION GAP SERPL CALCULATED.3IONS-SCNC: 11.8 MMOL/L (ref 5–15)
BUN SERPL-MCNC: 17 MG/DL (ref 8–23)
BUN/CREAT SERPL: 23 (ref 7–25)
CALCIUM SPEC-SCNC: 8.7 MG/DL (ref 8.6–10.5)
CHLORIDE SERPL-SCNC: 101 MMOL/L (ref 98–107)
CO2 SERPL-SCNC: 29.2 MMOL/L (ref 22–29)
CREAT SERPL-MCNC: 0.74 MG/DL (ref 0.57–1)
DEPRECATED RDW RBC AUTO: 50.3 FL (ref 37–54)
EGFRCR SERPLBLD CKD-EPI 2021: 79.4 ML/MIN/1.73
ERYTHROCYTE [DISTWIDTH] IN BLOOD BY AUTOMATED COUNT: 14.7 % (ref 12.3–15.4)
GLUCOSE BLDC GLUCOMTR-MCNC: 105 MG/DL (ref 70–130)
GLUCOSE BLDC GLUCOMTR-MCNC: 119 MG/DL (ref 70–130)
GLUCOSE BLDC GLUCOMTR-MCNC: 153 MG/DL (ref 70–130)
GLUCOSE BLDC GLUCOMTR-MCNC: 161 MG/DL (ref 70–130)
GLUCOSE SERPL-MCNC: 103 MG/DL (ref 65–99)
HCT VFR BLD AUTO: 29 % (ref 34–46.6)
HGB BLD-MCNC: 9.7 G/DL (ref 12–15.9)
MCH RBC QN AUTO: 31.4 PG (ref 26.6–33)
MCHC RBC AUTO-ENTMCNC: 33.4 G/DL (ref 31.5–35.7)
MCV RBC AUTO: 93.9 FL (ref 79–97)
PLATELET # BLD AUTO: 152 10*3/MM3 (ref 140–450)
PMV BLD AUTO: 12.6 FL (ref 6–12)
POTASSIUM SERPL-SCNC: 3.7 MMOL/L (ref 3.5–5.2)
RBC # BLD AUTO: 3.09 10*6/MM3 (ref 3.77–5.28)
SODIUM SERPL-SCNC: 142 MMOL/L (ref 136–145)
WBC NRBC COR # BLD: 61.26 10*3/MM3 (ref 3.4–10.8)

## 2023-02-09 PROCEDURE — 44360 SMALL BOWEL ENDOSCOPY: CPT | Performed by: INTERNAL MEDICINE

## 2023-02-09 PROCEDURE — 99232 SBSQ HOSP IP/OBS MODERATE 35: CPT | Performed by: NURSE PRACTITIONER

## 2023-02-09 PROCEDURE — 80048 BASIC METABOLIC PNL TOTAL CA: CPT | Performed by: STUDENT IN AN ORGANIZED HEALTH CARE EDUCATION/TRAINING PROGRAM

## 2023-02-09 PROCEDURE — 25010000002 PHENYLEPHRINE 10 MG/ML SOLUTION: Performed by: ANESTHESIOLOGY

## 2023-02-09 PROCEDURE — 45378 DIAGNOSTIC COLONOSCOPY: CPT | Performed by: INTERNAL MEDICINE

## 2023-02-09 PROCEDURE — 74150 CT ABDOMEN W/O CONTRAST: CPT

## 2023-02-09 PROCEDURE — 85027 COMPLETE CBC AUTOMATED: CPT | Performed by: STUDENT IN AN ORGANIZED HEALTH CARE EDUCATION/TRAINING PROGRAM

## 2023-02-09 PROCEDURE — 25010000002 HYDROMORPHONE PER 4 MG: Performed by: STUDENT IN AN ORGANIZED HEALTH CARE EDUCATION/TRAINING PROGRAM

## 2023-02-09 PROCEDURE — 0DJ08ZZ INSPECTION OF UPPER INTESTINAL TRACT, VIA NATURAL OR ARTIFICIAL OPENING ENDOSCOPIC: ICD-10-PCS | Performed by: INTERNAL MEDICINE

## 2023-02-09 PROCEDURE — 25010000002 PROPOFOL 10 MG/ML EMULSION: Performed by: ANESTHESIOLOGY

## 2023-02-09 PROCEDURE — 0DJD8ZZ INSPECTION OF LOWER INTESTINAL TRACT, VIA NATURAL OR ARTIFICIAL OPENING ENDOSCOPIC: ICD-10-PCS | Performed by: INTERNAL MEDICINE

## 2023-02-09 PROCEDURE — 82962 GLUCOSE BLOOD TEST: CPT

## 2023-02-09 PROCEDURE — 25010000002 ONDANSETRON PER 1 MG: Performed by: INTERNAL MEDICINE

## 2023-02-09 PROCEDURE — 99231 SBSQ HOSP IP/OBS SF/LOW 25: CPT | Performed by: INTERNAL MEDICINE

## 2023-02-09 RX ORDER — LIDOCAINE HYDROCHLORIDE 20 MG/ML
INJECTION, SOLUTION INFILTRATION; PERINEURAL AS NEEDED
Status: DISCONTINUED | OUTPATIENT
Start: 2023-02-09 | End: 2023-02-09 | Stop reason: SURG

## 2023-02-09 RX ORDER — PROPOFOL 10 MG/ML
VIAL (ML) INTRAVENOUS AS NEEDED
Status: DISCONTINUED | OUTPATIENT
Start: 2023-02-09 | End: 2023-02-09 | Stop reason: SURG

## 2023-02-09 RX ORDER — SODIUM CHLORIDE 9 MG/ML
1000 INJECTION, SOLUTION INTRAVENOUS CONTINUOUS
Status: ACTIVE | OUTPATIENT
Start: 2023-02-09 | End: 2023-02-11

## 2023-02-09 RX ORDER — PHENYLEPHRINE HYDROCHLORIDE 10 MG/ML
INJECTION INTRAVENOUS AS NEEDED
Status: DISCONTINUED | OUTPATIENT
Start: 2023-02-09 | End: 2023-02-09 | Stop reason: SURG

## 2023-02-09 RX ORDER — SODIUM CHLORIDE, SODIUM LACTATE, POTASSIUM CHLORIDE, CALCIUM CHLORIDE 600; 310; 30; 20 MG/100ML; MG/100ML; MG/100ML; MG/100ML
30 INJECTION, SOLUTION INTRAVENOUS CONTINUOUS
Status: DISCONTINUED | OUTPATIENT
Start: 2023-02-09 | End: 2023-02-12 | Stop reason: HOSPADM

## 2023-02-09 RX ORDER — HYDROMORPHONE HYDROCHLORIDE 1 MG/ML
0.5 INJECTION, SOLUTION INTRAMUSCULAR; INTRAVENOUS; SUBCUTANEOUS
Status: DISCONTINUED | OUTPATIENT
Start: 2023-02-09 | End: 2023-02-12 | Stop reason: HOSPADM

## 2023-02-09 RX ORDER — PROPOFOL 10 MG/ML
VIAL (ML) INTRAVENOUS CONTINUOUS PRN
Status: DISCONTINUED | OUTPATIENT
Start: 2023-02-09 | End: 2023-02-09 | Stop reason: SURG

## 2023-02-09 RX ADMIN — SODIUM CHLORIDE 1000 ML: 9 INJECTION, SOLUTION INTRAVENOUS at 11:57

## 2023-02-09 RX ADMIN — POLYETHYLENE GLYCOL 3350, SODIUM SULFATE ANHYDROUS, SODIUM BICARBONATE, SODIUM CHLORIDE, POTASSIUM CHLORIDE 2000 ML: 236; 22.74; 6.74; 5.86; 2.97 POWDER, FOR SOLUTION ORAL at 05:48

## 2023-02-09 RX ADMIN — FUROSEMIDE 40 MG: 40 TABLET ORAL at 08:32

## 2023-02-09 RX ADMIN — NYSTATIN: 100000 POWDER TOPICAL at 21:01

## 2023-02-09 RX ADMIN — PHENYLEPHRINE HYDROCHLORIDE 100 MCG: 10 INJECTION INTRAVENOUS at 12:35

## 2023-02-09 RX ADMIN — PROPOFOL 50 MG: 10 INJECTION, EMULSION INTRAVENOUS at 12:30

## 2023-02-09 RX ADMIN — NYSTATIN 500000 UNITS: 100000 SUSPENSION ORAL at 08:32

## 2023-02-09 RX ADMIN — NYSTATIN 500000 UNITS: 100000 SUSPENSION ORAL at 20:58

## 2023-02-09 RX ADMIN — ONDANSETRON 4 MG: 2 INJECTION INTRAMUSCULAR; INTRAVENOUS at 14:23

## 2023-02-09 RX ADMIN — SERTRALINE 50 MG: 50 TABLET, FILM COATED ORAL at 08:33

## 2023-02-09 RX ADMIN — Medication 140 MCG/KG/MIN: at 12:30

## 2023-02-09 RX ADMIN — HYDROMORPHONE HYDROCHLORIDE 0.5 MG: 1 INJECTION, SOLUTION INTRAMUSCULAR; INTRAVENOUS; SUBCUTANEOUS at 17:13

## 2023-02-09 RX ADMIN — METOPROLOL SUCCINATE 25 MG: 25 TABLET, EXTENDED RELEASE ORAL at 20:58

## 2023-02-09 RX ADMIN — LIDOCAINE HYDROCHLORIDE 30 MG: 20 INJECTION, SOLUTION INFILTRATION; PERINEURAL at 12:29

## 2023-02-09 RX ADMIN — NYSTATIN: 100000 POWDER TOPICAL at 08:33

## 2023-02-09 RX ADMIN — ATORVASTATIN CALCIUM 80 MG: 80 TABLET, FILM COATED ORAL at 20:58

## 2023-02-09 RX ADMIN — PANTOPRAZOLE SODIUM 40 MG: 40 TABLET, DELAYED RELEASE ORAL at 05:47

## 2023-02-09 RX ADMIN — METOPROLOL SUCCINATE 25 MG: 25 TABLET, EXTENDED RELEASE ORAL at 08:33

## 2023-02-09 RX ADMIN — PHENYLEPHRINE HYDROCHLORIDE 100 MCG: 10 INJECTION INTRAVENOUS at 12:37

## 2023-02-09 RX ADMIN — PHENYLEPHRINE HYDROCHLORIDE 100 MCG: 10 INJECTION INTRAVENOUS at 12:48

## 2023-02-09 NOTE — NURSING NOTE
"Paged cardiology regarding EKG result \"svt\". Shaina returned page. Telephone orders received for metoprolol.   "

## 2023-02-09 NOTE — PROGRESS NOTES
Name: Catherine Boyer ADMIT: 2023   : 1940  PCP: Kristi Moreau APRN    MRN: [id number] LOS: 5 days   AGE/SEX: 83 y.o. female  ROOM: [facility]/     Subjective   Subjective   No issues overnight.  Completed bowel prep.  No additional dyspnea.  Wished her happy [birth year] today.    Objective   Objective   Vital Signs  Temp:  [97.2 °F (36.2 °C)-99.8 °F (37.7 °C)] 98.6 °F (37 °C)  Heart Rate:  [] 108  Resp:  [18-19] 18  BP: (106-129)/(56-79) 129/56  SpO2:  [93 %-97 %] 93 %  on  Flow (L/min):  [2] 2;   Device (Oxygen Therapy): nasal cannula  Body mass index is 27.78 kg/m².  Physical Exam  Constitutional:       General: She is not in acute distress.     Appearance: Normal appearance.   Cardiovascular:      Rate and Rhythm: Normal rate and regular rhythm.      Pulses: Normal pulses.      Heart sounds: No murmur heard.    No gallop.   Pulmonary:      Effort: Pulmonary effort is normal. No respiratory distress.      Breath sounds: Normal breath sounds.   Abdominal:      General: Abdomen is flat. Bowel sounds are normal. There is no distension.      Palpations: Abdomen is soft.      Tenderness: There is no abdominal tenderness. There is no guarding.   Musculoskeletal:      Right lower leg: No edema.      Left lower leg: No edema.   Skin:     General: Skin is warm.   Neurological:      General: No focal deficit present.      Mental Status: She is alert and oriented to person, place, and time.   Psychiatric:         Mood and Affect: Mood normal.         Results Review     I reviewed the patient's new clinical results.  Results from last 7 days   Lab Units 23  0805 23  0406 23  0339 23  0023 23  1604 23  0323   WBC 10*3/mm3 61.26* 57.95* 49.82*  --   --  43.11*   HEMOGLOBIN g/dL 9.7* 9.2* 9.0* 8.6*   < > 8.6*   PLATELETS 10*3/mm3 152 220 253  --   --  305    < > = values in this interval not displayed.     Results from last 7 days   Lab Units 23  0805 23  0405  02/07/23 2022 02/07/23 0339 02/06/23 0323   SODIUM mmol/L 142 136  --  139 136   POTASSIUM mmol/L 3.7 3.9 4.4 3.0* 2.8*   CHLORIDE mmol/L 101 101  --  101 99   CO2 mmol/L 29.2* 26.0  --  26.2 26.9   BUN mg/dL 17 11  --  6* 10   CREATININE mg/dL 0.74 0.84  --  0.73 0.66   GLUCOSE mg/dL 103* 100*  --  90 59*   EGFR mL/min/1.73 79.4 68.6  --  81.2 86.6     Results from last 7 days   Lab Units 02/05/23  0356 02/04/23  1419   ALBUMIN g/dL 3.7 4.1   BILIRUBIN mg/dL 0.9 0.6   ALK PHOS U/L 171* 194*   AST (SGOT) U/L 23 35*   ALT (SGPT) U/L 41* 52*     Results from last 7 days   Lab Units 02/09/23  0805 02/08/23 0406 02/07/23 0339 02/06/23 0323 02/05/23 0356 02/04/23  1419   CALCIUM mg/dL 8.7 8.2* 8.2* 7.8* 8.2* 8.6   ALBUMIN g/dL  --   --   --   --  3.7 4.1   MAGNESIUM mg/dL  --   --   --   --  2.0  --        Glucose   Date/Time Value Ref Range Status   02/09/2023 0601 119 70 - 130 mg/dL Final     Comment:     Meter: [id number] : [id number] Chikis Tavarezherine NA   02/08/2023 2018 121 70 - 130 mg/dL Final     Comment:     Meter: [id number] : [id number] Chikislewis Martinez NA   02/08/2023 1618 134 (H) 70 - 130 mg/dL Final     Comment:     Meter: [id number] : [id number] Jean-Pierre Moreira CNA   02/08/2023 1101 200 (H) 70 - 130 mg/dL Final     Comment:     Meter: [id number] : [id number] Jean-Pierre Moreira CNA   02/08/2023 0616 101 70 - 130 mg/dL Final     Comment:     Meter: [id number] : [id number] Chikis Martinez NA   02/07/2023 2025 131 (H) 70 - 130 mg/dL Final     Comment:     Meter: [id number] : [id number] Chikis Martinez NA   02/07/2023 1609 115 70 - 130 mg/dL Final     Comment:     Meter: [id number] : [id number] Nirmal Parra CNA       No radiology results for the last day  Scheduled Medications  atorvastatin, 80 mg, Oral, Nightly  empagliflozin, 10 mg, Oral, Daily  furosemide, 40 mg, Oral, Daily  insulin lispro, 0-7 Units, Subcutaneous, TID AC  latanoprost, 1 drop, Both Eyes, Nightly  metoprolol  succinate XL, 25 mg, Oral, Q12H  nystatin, 5 mL, Oral, 4x Daily  nystatin, , Topical, Q12H  pantoprazole, 40 mg, Oral, Q AM  sertraline, 50 mg, Oral, Daily    Infusions   Diet  NPO Diet NPO Type: Strict NPO       Assessment/Plan     Active Hospital Problems    Diagnosis  POA   • **ABLA (acute blood loss anemia) [D62]  Yes   • Atherosclerosis of abdominal aorta (HCC) [I70.0]  Yes   • Chronic diastolic (congestive) heart failure (HCC) [I50.32]  Yes   • Personal history of transient ischemic attack (TIA), 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 deficits [Z86.73]  Not Applicable   • CAD (coronary artery disease) [I25.10]  Yes   • Myeloproliferative disorder (HCC) [D47.1]  Yes   • Paroxysmal atrial fibrillation (HCC) [I48.0]  Yes   • Type 2 diabetes mellitus with circulatory disorder, without long-term current use of insulin (HCC) [E11.59]  Yes      Resolved Hospital Problems    Diagnosis Date Resolved POA   • Hypokalemia [E87.6] 02/08/2023 Unknown       83 y.o. female admitted with ABLA (acute blood loss anemia).      02/09/23  Bidirectional endoscopy today.    Acute blood loss anemia  Melena  -Hb 6.1 OA > 8.6 > 9  -GI following-planning for bidirectional endoscopy on 2/9/2023, waiting for Plavix to clear system    Chronic diastolic heart failure  -TTE (9/1/22): EF 57%   -Cardiology following  -Continue Lasix 40 mg    DM2  -Last A1c 6.7 (11/28/22)  -resume Jardiance  -SSI    Myeloproliferative disorder  Anemia, macrocytic  Leukocytosis  -On Jakafi 15 mg twice daily-holding given association with hemorrhage  -Hematology following  -WBC continues to climb, 57 > 61 (2/9/23)  CAD  -s/p 2 LEFTY 2/2022; LEFTY to diagonal a. 9/2/22 for ISR  -continue atorvastatin 80mg  -Plavix on hold for above  -No ASA given apixaban     Paroxysmal Afib  -RC: Metoprolol 25 mg  -AC: Apixaban, on hold      HTN  -metoprolol 25mg BID    · Eliquis (home med) for DVT prophylaxis ON HOLD  · Full code.  · Discussed with patient and care team on  multidisciplinary rounds.  · [facility]      Anthony Muñoz MD  [facility]  02/09/23  09:35 EST

## 2023-02-09 NOTE — PLAN OF CARE
Problem: Adult Inpatient Plan of Care  Goal: Plan of Care Review  Outcome: Ongoing, Progressing  Flowsheets (Taken 2/9/2023 0345)  Progress: improving  Plan of Care Reviewed With: patient  Outcome Evaluation: Bowel prep nearing completion, & stools are yellow and clear. NPO since midnight otherwise. Tolerating up to bedside commode moderately with some generalized fatigue. Betablocker given last @ 2100- HR improved. Nystatin powder to yeast appearing folds in groin, and under abdomen fold. Consents at patient bedside awaiting on patient signatures as she is actively on bedside commode and unable to sign at the moment. She voices that her children were very encouraging to complete this procedure. VSS. Continuing to monitor.  Goal: Patient-Specific Goal (Individualized)  Outcome: Ongoing, Progressing  Goal: Absence of Hospital-Acquired Illness or Injury  Outcome: Ongoing, Progressing  Intervention: Identify and Manage Fall Risk  Recent Flowsheet Documentation  Taken 2/9/2023 0215 by Sara Bravo RN  Safety Promotion/Fall Prevention: safety round/check completed  Taken 2/8/2023 2019 by Sara Bravo RN  Safety Promotion/Fall Prevention: safety round/check completed  Intervention: Prevent Skin Injury  Recent Flowsheet Documentation  Taken 2/9/2023 0215 by Sara Braov RN  Body Position:   position changed independently   right  Taken 2/8/2023 2019 by Sara Bravo RN  Body Position:   position changed independently   supine  Intervention: Prevent and Manage VTE (Venous Thromboembolism) Risk  Recent Flowsheet Documentation  Taken 2/9/2023 0215 by Sara Bravo RN  Activity Management: activity adjusted per tolerance  Taken 2/8/2023 2019 by Sara Bravo RN  Activity Management: activity adjusted per tolerance  VTE Prevention/Management:   bilateral   sequential compression devices off  Intervention: Prevent Infection  Recent Flowsheet Documentation  Taken 2/8/2023 2019 by Sara Bravo RN  Infection  Prevention: hand hygiene promoted  Goal: Optimal Comfort and Wellbeing  Outcome: Ongoing, Progressing  Goal: Readiness for Transition of Care  Outcome: Ongoing, Progressing     Problem: Skin Injury Risk Increased  Goal: Skin Health and Integrity  Outcome: Ongoing, Progressing  Intervention: Optimize Skin Protection  Recent Flowsheet Documentation  Taken 2/8/2023 2019 by Sara Bravo, RN  Head of Bed (HOB) Positioning: HOB at 30 degrees   Goal Outcome Evaluation:  Plan of Care Reviewed With: patient        Progress: improving  Outcome Evaluation: Bowel prep nearing completion, & stools are yellow and clear. NPO since midnight otherwise. Tolerating up to bedside commode moderately with some generalized fatigue. Betablocker given last @ 2100- HR improved. Nystatin powder to yeast appearing folds in groin, and under abdomen fold. Consents at patient bedside awaiting on patient signatures as she is actively on bedside commode and unable to sign at the moment. She voices that her children were very encouraging to complete this procedure. VSS. Continuing to monitor.

## 2023-02-09 NOTE — PROGRESS NOTES
[facility] INPATIENT PROGRESS NOTE  Subjective     CC: Myeloproliferative disorder      History of present illness:  The patient is a 82 y.o. year old female with medical history significant for JAK2 positive myeloproliferative disorder, perhaps primary myelofibrosis, A-fib on chronic Eliquis, CAD with stents and on Plavix, history of endocarditis and TIA had presented to [facility] ER on 2/4/2023 with abdominal pain and black stools for several days.     Lab work revealed hemoglobin of 6.1, WBC count of 56.36 and platelet count of 385,000.  CT A/P noted no evidence of acute abnormality in the abdomen or pelvis.  Small hiatal hernia.  Previous cholecystectomy.  Sigmoid diverticulosis without evidence of diverticulitis.     Of note, patient was hospitalized around 2 weeks ago with abdominal pain.  MRCP on 1/25/2023 had shown minimal intrahepatic biliary prominence, no CBD/PD dilatation, 5 mm lesion in the body of pancreas possibly representing IPMN.       EGD performed 1/25/2023 had noted mild mucosal changes characterized by slowing in the lower third of the esophagus.  Patchy mild inflammation characterized by congestion and erythema found in the gastric body.  The examined duodenum was normal.     Patient has been seen by GI.  Started on PPI.  Eliquis and Plavix on hold.  Pending clinical course, plan to consider colonoscopy +/- EGD/push enteroscopy.     Hematology/oncology consulted for further evaluation and management.  Patient resting comfortably in bed.  Says she had a BM today which was dark brown in color.  Denies any bleeding from anywhere else.    Initial recommendations include anticoagulation on hold with Eliquis and Plavix, on PPI, Jakafi held, IV iron given, consideration of Hydrea if needed.       Interval history:   2/6/2023  T97.5, pulse 110, respirations 16, BP 97/48, SPO2 94%  Patient, again, states her abdominal pain is now resolved  Patient improved per abdominal  pain.  Again enteroscopy and C-scope with Plavix discontinued for 5 days.  H&H 8.6 and 25.9, white count 43,110, platelet count 305,000    2/7/2023  T90.2, pulse 93, respirations 18,  56  Patient now scheduled for EGD, small bowel enteroscopy and colonoscopy 2/9/2023.  H&H 9.0 and 27.2, white count of 49 820, BUN/creatinine of 6 and 0.73    2/8/2023  T99.1, pulse 90, respirations 18, /58  Record reviewed and endoscopy to proceed to status 9/23, patient agrees  H&H 9.2 and 27.4, white count 28708, platelet count of 220,000    2/9/2022  T90.6, pulse 103, respirations 19, /67, SPO2 of 99%  Patient now being transported for endoscopy  H&H 9.7 and 29.0, white count of 61,260, platelet count 152,000         Medications:  The current medication list was reviewed in the EMR.    Allergies:    Allergies   Allergen Reactions   • Aspirin Unknown - Low Severity   • Oxycodone Unknown - Low Severity   • Hydroxyurea Other (See Comments)     Her hemoglobin drop and was stop in February 2022 and start taking Jakafi   • Diphenhydramine Hcl Anxiety and Unknown (See Comments)     Benadryl IVP       Objective      Vitals:    02/09/23 1154   BP: 122/67   Pulse: 103   Resp: 19   Temp:    SpO2: 99%      Physical Exam  Constitutional:       Appearance: Normal appearance.   HENT:      Head: Normocephalic and atraumatic.      Nose: Nose normal.      Mouth/Throat:      Mouth: Mucous membranes are moist.      Pharynx: Oropharynx is clear.   Eyes:      Extraocular Movements: Extraocular movements intact.      Conjunctiva/sclera: Conjunctivae normal.      Pupils: Pupils are equal, round, and reactive to light.   Cardiovascular:      Rate and Rhythm: Normal rate and regular rhythm.      Pulses: Normal pulses.      Heart sounds: Normal heart sounds.   Pulmonary:      Effort: Pulmonary effort is normal.      Breath sounds: Normal breath sounds.   Abdominal:      General: Bowel sounds are normal.      Palpations: Abdomen is soft.  There is no mass.   Musculoskeletal:         General: Normal range of motion.      Cervical back: Normal range of motion and neck supple.   Skin:     General: Skin is warm and dry.   Neurological:      General: No focal deficit present.      Mental Status: She is alert and oriented to person, place, and time.   Psychiatric:         Behavior: Behavior normal.       RECENT LABS:    Results from last 7 days   Lab Units 02/09/23  0805 02/08/23  0406 02/07/23  0339 02/06/23  1604 02/06/23  0323 02/04/23  2335 02/04/23  1419   WBC 10*3/mm3 61.26* 57.95* 49.82*  --  43.11*   < > 56.36*   HEMOGLOBIN g/dL 9.7* 9.2* 9.0*   < > 8.6*   < > 6.1*   HEMATOCRIT % 29.0* 27.4* 27.2*   < > 25.9*   < > 18.4*   PLATELETS 10*3/mm3 152 220 253  --  305   < > 385   MONOCYTES % %  --  1.0*  --   --  1.1*  --  11.0    < > = values in this interval not displayed.     Results from last 7 days   Lab Units 02/09/23  0805 02/08/23  0406 02/07/23  2022 02/07/23  0339 02/06/23  0323 02/05/23  0356 02/04/23  1419   SODIUM mmol/L 142 136  --  139   < > 136 136   POTASSIUM mmol/L 3.7 3.9 4.4 3.0*   < > 3.6 4.3   CHLORIDE mmol/L 101 101  --  101   < > 104 100   CO2 mmol/L 29.2* 26.0  --  26.2   < > 24.1 26.0   BUN mg/dL 17 11  --  6*   < > 11 13   CREATININE mg/dL 0.74 0.84  --  0.73   < > 0.71 0.76   CALCIUM mg/dL 8.7 8.2*  --  8.2*   < > 8.2* 8.6   BILIRUBIN mg/dL  --   --   --   --   --  0.9 0.6   ALK PHOS U/L  --   --   --   --   --  171* 194*   ALT (SGPT) U/L  --   --   --   --   --  41* 52*   AST (SGOT) U/L  --   --   --   --   --  23 35*   GLUCOSE mg/dL 103* 100*  --  90   < > 75 119*    < > = values in this interval not displayed.     Results from last 7 days   Lab Units 02/05/23  0356   MAGNESIUM mg/dL 2.0       Assessment & Plan     Ms. Boyer is a pleasant 82-year-old female with medical history significant for JAK2 positive myeloproliferative disorder, perhaps primary myelofibrosis, A-fib on chronic Eliquis, CAD with stents and on Plavix  admitted with GI bleed.     #GI bleed, likely upper:  • Presented with abdominal pain and black stool.  Hemoglobin 6.1 on presentation.  • EGD performed during last admission on 1/25/2023 had noted mild mucosal changes characterized by slowing in the lower third of the esophagus.  Patchy mild inflammation characterized by congestion and erythema found in the gastric body.  The examined duodenum was normal.  Multiple biopsies taken.  • GI on board.  Thinks she may have oozed from the prior biopsy site.  • Anticoagulation with Eliquis and Plavix on hold.  On PPI  • GI follow-up- EGD/push enteroscopy and colonoscopy when Eliquis held 5 days.  This is now proceeding to assess 9/23     #Severe anemia, likely blood loss related in the setting of primary myelofibrosis:  • Hemoglobin 6.1 on admission.  Iron profile showed normal iron level of 63, ferritin of 115 and iron saturation of 12%.  • Hemoglobin did improve to 7.3 after transfusion, however dropped again to 6.8.  Being transfused 1 unit PRBC.  • Continue transfusion to keep hemoglobin > 7.   • Will hold jakafi as it can be associated with anemia and hemorrhage.  • Will administer IV iron as well.  • Follow-up assessment 2/6/2023-8.6 hemoglobin, status post Ferrlecit 250 mg IV 2/5/2023  • Stable to improved 2/9/2023 with H&H of 9.7 and 29.0.     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 Reinoso in our practice.  • She is currently on Jakafi 15 mg BID as outpatient.  • Given some risk of hemorrhage associated with jakafi, will hold hold Jakafi while inpatient.     #Leukocytosis, neutrophilic predominant:   • WBC count 56.36 on admission on 2/4/2023  • This is likely myeloproliferative disease related.  His WBC count has ranged between 30- 50,000 since November 2022.  • Last bone marrow biopsy from October 2022 had shown hypercellular marrow at 95% with involvement by chronic myeloproliferative  neoplasm, less than 5% blasts.  Mild reticulin fibrosis.  Decreased iron stores.  Consideration of CMML.    • Will monitor for now.  Consider hydroxyurea if worsening leukocytosis.     #A-fib: Eliquis on hold     #CAD s/p PCI: Plavix on hold.     Recommendations:  -Again, we hold jakafi for now  -IV iron now completed with further improvement in H/H  -Will consider hydroxyurea if further worsening leukocytosis.  This is increasingly becoming an option.  -Transfuse to keep hemoglobin > 7  -EGD and colonoscopy 2/9/2023  -We will continue to follow                 Venancio Barajas MD  2/9/2023  12:09 EST

## 2023-02-09 NOTE — PLAN OF CARE
Goal Outcome Evaluation:  Plan of Care Reviewed With: patient        Progress: no change  Outcome Evaluation: Pt had EGD and colonoscopy today, returned to unit complaining of extreme pain and started vomiting, no relief with zofran, gastro aware and ordered stat CT scan of abdomen.  Dilaudid ordered for PRN pain.  Pt states pain is worse than when she was admitted to the hospital.  2L nasal cannula on patient.  Iv patent and intact.

## 2023-02-09 NOTE — ANESTHESIA PREPROCEDURE EVALUATION
Anesthesia Evaluation     Patient summary reviewed and Nursing notes reviewed   no history of anesthetic complications:  NPO Solid Status: > 8 hours  NPO Liquid Status: > 8 hours           Airway   Mallampati: II  TM distance: >3 FB  Neck ROM: full  No difficulty expected  Dental    (+) poor dentition    Pulmonary    (-) rhonchi, decreased breath sounds, wheezes, not a smoker  Cardiovascular   Exercise tolerance: good (4-7 METS)    Rhythm: regular  Rate: normal    (+) hypertension, CAD, cardiac stents Drug eluting stent within the past 12 months dysrhythmias Atrial Fib, CHF ,  carotid artery disease  (-) angina, FLORES, murmur      Neuro/Psych  (+) TIA,    (-) CVA  GI/Hepatic/Renal/Endo    (+)  GERD well controlled, PUD, GI bleeding , diabetes mellitus type 2 well controlled,   (-) no renal disease    Musculoskeletal     Abdominal     Abdomen: soft.   Substance History      OB/GYN          Other   blood dyscrasia,   history of cancer                  Anesthesia Plan    ASA 3     MAC   total IV anesthesia  intravenous induction     Anesthetic plan, risks, benefits, and alternatives have been provided, discussed and informed consent has been obtained with: patient.        CODE STATUS:    Code Status (Patient has no pulse and is not breathing): CPR (Attempt to Resuscitate)  Medical Interventions (Patient has pulse or is breathing): Full

## 2023-02-09 NOTE — PROGRESS NOTES
"    Patient Name: Catherine Boyer  :1940  83 y.o.      Patient Care Team:  Kristi Moreau APRN as PCP - General (Nurse Practitioner)  Gerard Foreman MD as Referring Physician (Medical Oncology)  Darrell Reinoso MD as Consulting Physician (Hematology and Oncology)  Albin Barriga MD as Consulting Physician (Cardiology)  Richard Aldana, RN as     Chief Complaint: follow up atrial fibrillation, GIB, coronary artery disease    Interval History:   She is very uncomfortable. Lots of cramping. Nausea and vomiting some.    Objective   Vital Signs  Temp:  [98.6 °F (37 °C)-99.8 °F (37.7 °C)] 98.6 °F (37 °C)  Heart Rate:  [] 78  Resp:  [18-19] 18  BP: ()/(44-79) 125/49    Intake/Output Summary (Last 24 hours) at 2023 1545  Last data filed at 2023 1254  Gross per 24 hour   Intake 200 ml   Output 100 ml   Net 100 ml     Flowsheet Rows    Flowsheet Row First Filed Value   Admission Height 154.9 cm (61\") Documented at 2023 1422   Admission Weight 66.7 kg (147 lb) Documented at 2023 1422          Physical Exam:   General Appearance:    Alert, cooperative, in no acute distress   Lungs:     Clear to auscultation.  Normal respiratory effort and rate.      Heart:    Regular rhythm and normal rate, normal S1 and S2, no murmurs, gallops or rubs.     Chest Wall:    No abnormalities observed   Abdomen:     Soft, nontender, positive bowel sounds.     Extremities:   no cyanosis, clubbing or edema.  No marked joint deformities.  Adequate musculoskeletal strength.       Results Review:    Results from last 7 days   Lab Units 23  0805   SODIUM mmol/L 142   POTASSIUM mmol/L 3.7   CHLORIDE mmol/L 101   CO2 mmol/L 29.2*   BUN mg/dL 17   CREATININE mg/dL 0.74   GLUCOSE mg/dL 103*   CALCIUM mg/dL 8.7     Results from last 7 days   Lab Units 23  1148 23  0914   HSTROP T ng/L 30* 30*     Results from last 7 days   Lab Units 23  0805   WBC 10*3/mm3 61.26*   HEMOGLOBIN g/dL 9.7* " TPE tx #[id number]. Initiated tx at 0850 and ended at 1200. Patient stable throughout tx; tolerated procedure without complaint of pain of SOB. Used 100% FFP for replacement fluid. No s/s of adverse reaction. Dr. Willson came to see patient during treatment. See paper flow sheets for details. Report given to Primary Nurse.      HEMATOCRIT % 29.0*   PLATELETS 10*3/mm3 152     Results from last 7 days   Lab Units 02/04/23  1419   INR  1.16*   APTT seconds 41.2*     Results from last 7 days   Lab Units 02/05/23  0356   MAGNESIUM mg/dL 2.0                   Medication Review:   atorvastatin, 80 mg, Oral, Nightly  empagliflozin, 10 mg, Oral, Daily  furosemide, 40 mg, Oral, Daily  insulin lispro, 0-7 Units, Subcutaneous, TID AC  latanoprost, 1 drop, Both Eyes, Nightly  metoprolol succinate XL, 25 mg, Oral, Q12H  nystatin, 5 mL, Oral, 4x Daily  nystatin, , Topical, Q12H  pantoprazole, 40 mg, Oral, Q AM  sertraline, 50 mg, Oral, Daily         lactated ringers, 30 mL/hr  sodium chloride, 1,000 mL, Last Rate: 25 mL/hr at 02/09/23 1223        Assessment & Plan   1. Acute blood loss anemia , GI following. Apixaban and clopidogrel held currently. Status post EGD and colonoscopy today.     2. Coronary artery disease status post stent placement to the diagonal in February 2022 then restenosis in September 2022 requiring repeat stenting. She has class I indication for antiplatelet therapy if safe moving forward from a GI standpoint.    3. Paroxysmal atrial fibrillation, now in AF. HR mildly tachy. Now back on home beta blocker and HR better.  Apixaban on hold. CHADs 2 Vasc score of 9, thus high risk of CVA off anticoagulation.     4. Hypertension    5. Chronic HFpEF - continue oral lasix. Watch for volume overload.    6. Nonbacterial thrombotic endocarditis, diagnosed June 2021 and stable.    This is her second presentation of severe anemia . She had a hgb of 5.5 in February 2022.     She had a stent placed 5 months ago. At this point, she needs to go back on plavix. Hold off on apixaban given recurrent severe anemia. Will re evaluate once she has had capsule endoscopy (planned in 2 weeks).     Discussed with Dr. Barriga.     ABEL Carbajal  [facility]  02/09/23  15:45 EST

## 2023-02-09 NOTE — ANESTHESIA POSTPROCEDURE EVALUATION
"Patient: Catherine Boyer    Procedure Summary     Date: 02/09/23 Room / Location:  [facility]    Anesthesia Start: 1223 Anesthesia Stop: 1256    Procedures:       COLONOSCOPY TO CECUM & T.I.      ENTEROSCOPY SMALL BOWEL (Esophagus) Diagnosis:       ABLA (acute blood loss anemia)      (ABLA (acute blood loss anemia) [D62])    Surgeons: Guero Ferris MD Provider: Charles Hernandez MD    Anesthesia Type: MAC ASA Status: 3          Anesthesia Type: MAC    Vitals  Vitals Value Taken Time   /53 02/09/23 1320   Temp     Pulse 92 02/09/23 1330   Resp 18 02/09/23 1318   SpO2 99 % 02/09/23 1330   Vitals shown include unvalidated device data.        Post Anesthesia Care and Evaluation    Patient location during evaluation: bedside  Patient participation: complete - patient participated  Level of consciousness: awake and alert  Pain management: adequate    Airway patency: patent  Anesthetic complications: No anesthetic complications  PONV Status: none  Cardiovascular status: acceptable  Respiratory status: acceptable  Hydration status: acceptable    Comments: /49 (BP Location: Left arm, Patient Position: Lying)   Pulse 78   Temp 37 °C (98.6 °F) (Oral)   Resp 18   Ht 154.9 cm (61\")   Wt 66.7 kg (147 lb)   SpO2 100%   BMI 27.78 kg/m²         "

## 2023-02-10 LAB
GLUCOSE BLDC GLUCOMTR-MCNC: 136 MG/DL (ref 70–130)
GLUCOSE BLDC GLUCOMTR-MCNC: 153 MG/DL (ref 70–130)
GLUCOSE BLDC GLUCOMTR-MCNC: 170 MG/DL (ref 70–130)
GLUCOSE BLDC GLUCOMTR-MCNC: 92 MG/DL (ref 70–130)

## 2023-02-10 PROCEDURE — 63710000001 RUXOLITINIB 15 MG TABLET: Performed by: INTERNAL MEDICINE

## 2023-02-10 PROCEDURE — C9399 UNCLASSIFIED DRUGS OR BIOLOG: HCPCS | Performed by: INTERNAL MEDICINE

## 2023-02-10 PROCEDURE — 63710000001 INSULIN LISPRO (HUMAN) PER 5 UNITS: Performed by: INTERNAL MEDICINE

## 2023-02-10 PROCEDURE — 99232 SBSQ HOSP IP/OBS MODERATE 35: CPT | Performed by: INTERNAL MEDICINE

## 2023-02-10 PROCEDURE — 82962 GLUCOSE BLOOD TEST: CPT

## 2023-02-10 PROCEDURE — 99232 SBSQ HOSP IP/OBS MODERATE 35: CPT | Performed by: NURSE PRACTITIONER

## 2023-02-10 RX ORDER — ASPIRIN 81 MG/1
81 TABLET ORAL DAILY
Status: DISCONTINUED | OUTPATIENT
Start: 2023-02-10 | End: 2023-02-12 | Stop reason: HOSPADM

## 2023-02-10 RX ORDER — SIMETHICONE 80 MG
80 TABLET,CHEWABLE ORAL 4 TIMES DAILY PRN
Status: DISCONTINUED | OUTPATIENT
Start: 2023-02-10 | End: 2023-02-12 | Stop reason: HOSPADM

## 2023-02-10 RX ORDER — PANTOPRAZOLE SODIUM 40 MG/1
40 TABLET, DELAYED RELEASE ORAL
Status: DISCONTINUED | OUTPATIENT
Start: 2023-02-10 | End: 2023-02-12 | Stop reason: HOSPADM

## 2023-02-10 RX ORDER — CLOPIDOGREL BISULFATE 75 MG/1
75 TABLET ORAL DAILY
Status: DISCONTINUED | OUTPATIENT
Start: 2023-02-10 | End: 2023-02-12 | Stop reason: HOSPADM

## 2023-02-10 RX ORDER — SUCRALFATE 1 G/1
1 TABLET ORAL
Status: DISCONTINUED | OUTPATIENT
Start: 2023-02-10 | End: 2023-02-12 | Stop reason: HOSPADM

## 2023-02-10 RX ADMIN — PANTOPRAZOLE SODIUM 40 MG: 40 TABLET, DELAYED RELEASE ORAL at 06:05

## 2023-02-10 RX ADMIN — NYSTATIN 500000 UNITS: 100000 SUSPENSION ORAL at 16:51

## 2023-02-10 RX ADMIN — METOPROLOL SUCCINATE 25 MG: 25 TABLET, EXTENDED RELEASE ORAL at 08:51

## 2023-02-10 RX ADMIN — CLOPIDOGREL 75 MG: 75 TABLET, FILM COATED ORAL at 15:18

## 2023-02-10 RX ADMIN — RUXOLITINIB 15 MG: 15 TABLET ORAL at 16:49

## 2023-02-10 RX ADMIN — NYSTATIN 500000 UNITS: 100000 SUSPENSION ORAL at 12:05

## 2023-02-10 RX ADMIN — PANTOPRAZOLE SODIUM 40 MG: 40 TABLET, DELAYED RELEASE ORAL at 16:49

## 2023-02-10 RX ADMIN — SUCRALFATE 1 G: 1 TABLET ORAL at 16:49

## 2023-02-10 RX ADMIN — NYSTATIN: 100000 POWDER TOPICAL at 08:51

## 2023-02-10 RX ADMIN — NYSTATIN 500000 UNITS: 100000 SUSPENSION ORAL at 20:18

## 2023-02-10 RX ADMIN — SUCRALFATE 1 G: 1 TABLET ORAL at 13:09

## 2023-02-10 RX ADMIN — SERTRALINE 50 MG: 50 TABLET, FILM COATED ORAL at 08:51

## 2023-02-10 RX ADMIN — INSULIN LISPRO 2 UNITS: 100 INJECTION, SOLUTION INTRAVENOUS; SUBCUTANEOUS at 12:06

## 2023-02-10 RX ADMIN — ATORVASTATIN CALCIUM 80 MG: 80 TABLET, FILM COATED ORAL at 20:22

## 2023-02-10 RX ADMIN — ASPIRIN 81 MG: 81 TABLET, COATED ORAL at 15:18

## 2023-02-10 RX ADMIN — NYSTATIN: 100000 POWDER TOPICAL at 20:18

## 2023-02-10 RX ADMIN — FUROSEMIDE 40 MG: 40 TABLET ORAL at 08:51

## 2023-02-10 RX ADMIN — SUCRALFATE 1 G: 1 TABLET ORAL at 20:18

## 2023-02-10 RX ADMIN — NYSTATIN 500000 UNITS: 100000 SUSPENSION ORAL at 08:51

## 2023-02-10 RX ADMIN — METOPROLOL SUCCINATE 25 MG: 25 TABLET, EXTENDED RELEASE ORAL at 20:18

## 2023-02-10 RX ADMIN — LATANOPROST 1 DROP: 50 SOLUTION OPHTHALMIC at 21:39

## 2023-02-10 NOTE — PROGRESS NOTES
"    Patient Name: Catherine Boyer  :1940  83 y.o.      Patient Care Team:  Kristi Moreau APRN as PCP - General (Nurse Practitioner)  Gerard Foreman MD as Referring Physician (Medical Oncology)  Darrell Reinoso MD as Consulting Physician (Hematology and Oncology)  Albin Barriga MD as Consulting Physician (Cardiology)  Richard Aldana, RN as     Chief Complaint: follow up atrial fibrillation, GIB, coronary artery disease    Interval History:  She is resting in bed. Feels better than yesterday. Still a little abd discomfort.     Objective   Vital Signs  Temp:  [98.4 °F (36.9 °C)-98.9 °F (37.2 °C)] 98.4 °F (36.9 °C)  Heart Rate:  [] 83  Resp:  [18] 18  BP: (100-120)/(43-69) 114/58    Intake/Output Summary (Last 24 hours) at 2/10/2023 1318  Last data filed at 2/10/2023 0915  Gross per 24 hour   Intake 120 ml   Output --   Net 120 ml     Flowsheet Rows    Flowsheet Row First Filed Value   Admission Height 154.9 cm (61\") Documented at 2023 1422   Admission Weight 66.7 kg (147 lb) Documented at 2023 1422          Physical Exam:   General Appearance:    Alert, cooperative, in no acute distress   Lungs:     Clear to auscultation.  Normal respiratory effort and rate.      Heart:    Regular rhythm and normal rate, normal S1 and S2, no murmurs, gallops or rubs.     Chest Wall:    No abnormalities observed   Abdomen:     Soft, nontender, positive bowel sounds.     Extremities:   no cyanosis, clubbing or edema.  No marked joint deformities.  Adequate musculoskeletal strength.       Results Review:    Results from last 7 days   Lab Units 23  0805   SODIUM mmol/L 142   POTASSIUM mmol/L 3.7   CHLORIDE mmol/L 101   CO2 mmol/L 29.2*   BUN mg/dL 17   CREATININE mg/dL 0.74   GLUCOSE mg/dL 103*   CALCIUM mg/dL 8.7     Results from last 7 days   Lab Units 23  1148 23  0914   HSTROP T ng/L 30* 30*     Results from last 7 days   Lab Units 23  0805   WBC 10*3/mm3 61.26*   HEMOGLOBIN " g/dL 9.7*   HEMATOCRIT % 29.0*   PLATELETS 10*3/mm3 152     Results from last 7 days   Lab Units 02/04/23  1419   INR  1.16*   APTT seconds 41.2*     Results from last 7 days   Lab Units 02/05/23  0356   MAGNESIUM mg/dL 2.0                   Medication Review:   atorvastatin, 80 mg, Oral, Nightly  empagliflozin, 10 mg, Oral, Daily  furosemide, 40 mg, Oral, Daily  insulin lispro, 0-7 Units, Subcutaneous, TID AC  latanoprost, 1 drop, Both Eyes, Nightly  metoprolol succinate XL, 25 mg, Oral, Q12H  nystatin, 5 mL, Oral, 4x Daily  nystatin, , Topical, Q12H  pantoprazole, 40 mg, Oral, BID AC  sertraline, 50 mg, Oral, Daily  sucralfate, 1 g, Oral, 4x Daily AC & at Bedtime         lactated ringers, 30 mL/hr  sodium chloride, 1,000 mL, Last Rate: 25 mL/hr at 02/09/23 1223        Assessment & Plan   1. Acute blood loss anemia , GI following. Apixaban and clopidogrel held currently. Status post EGD and colonoscopy today.     2. Coronary artery disease status post stent placement to the diagonal in February 2022 then restenosis in September 2022 requiring repeat stenting. She has class I indication for antiplatelet therapy if safe moving forward from a GI standpoint.    3. Paroxysmal atrial fibrillation, now in AF. HR mildly tachy. Now back on home beta blocker and HR better.  Apixaban on hold. CHADs 2 Vasc score of 9, thus high risk of CVA off anticoagulation.     4. Hypertension    5. Chronic HFpEF - continue oral lasix. Watch for volume overload.    6. Nonbacterial thrombotic endocarditis, diagnosed June 2021 and stable.    This is her second presentation of severe anemia . She had a hgb of 5.5 in February 2022.     She had a stent placed 5 months ago. At this point, she needs to go back on plavix and low dose ASA until March 2. Hold off on apixaban given recurrent severe anemia. Will re evaluate once she has had capsule endoscopy (planned in 2 weeks). She has an appt with [facility] on 2/16 --- will have this pushed out a couple  anisa.     Discussed with Dr. Barriga.     Nothing further to add from cardiac standpoint. Will see as needed.     ABEL Carbajal  [facility]  02/10/23  13:18 EST

## 2023-02-10 NOTE — PLAN OF CARE
Goal Outcome Evaluation:  Plan of Care Reviewed With: patient        Progress: improving  Outcome Evaluation: VSS, no needs today or complaints, plan to DC tomorrow, tolerating regular diet well

## 2023-02-10 NOTE — PLAN OF CARE
Goal Outcome Evaluation:  Plan of Care Reviewed With: patient        Progress: no change  Outcome Evaluation: Pt states pain improved. States feels more comfortable. VSS

## 2023-02-10 NOTE — PROGRESS NOTES
[facility]  Inpatient Progress Note    Reason for Followup:  abd pain    Subjective:  S/p bidrectional endoscopy yesterday.  Had some pain afterwards but feels well today.  No abd pain, nausea currently even after eating.    Current Facility-Administered Medications:   •  acetaminophen (TYLENOL) tablet 650 mg, 650 mg, Oral, Q4H PRN, Jaimee Macias MD, 650 mg at 02/08/23 0922  •  atorvastatin (LIPITOR) tablet 80 mg, 80 mg, Oral, Nightly, Jaimee Macias MD, 80 mg at 02/09/23 2058  •  dextrose (D50W) (25 g/50 mL) IV injection 25 g, 25 g, Intravenous, Q15 Min PRN, Orlando Sewell MD  •  dextrose (GLUTOSE) oral gel 15 g, 15 g, Oral, Q15 Min PRN, Orlando Sewell MD  •  empagliflozin (JARDIANCE) tablet 10 mg, 10 mg, Oral, Daily, Jaimee Macias MD, 10 mg at 02/08/23 1049  •  furosemide (LASIX) tablet 40 mg, 40 mg, Oral, Daily, Jaimee Macias MD, 40 mg at 02/09/23 0832  •  glucagon (GLUCAGEN) injection 1 mg, 1 mg, Intramuscular, Q15 Min PRN, Orlando Sewell MD  •  HYDROmorphone (DILAUDID) injection 0.5 mg, 0.5 mg, Intravenous, Q2H PRN, Anthony Muñoz MD, 0.5 mg at 02/09/23 1713  •  insulin lispro (ADMELOG) injection 0-7 Units, 0-7 Units, Subcutaneous, TID AC, Orlando Sewell MD, 3 Units at 02/08/23 1151  •  lactated ringers infusion, 30 mL/hr, Intravenous, Continuous, Sarah Miles MD  •  latanoprost (XALATAN) 0.005 % ophthalmic solution 1 drop, 1 drop, Both Eyes, Nightly, Jaimee Macisa MD, 1 drop at 02/08/23 2044  •  melatonin tablet 3 mg, 3 mg, Oral, Nightly PRN, Jaimee Macias MD, 3 mg at 02/06/23 2350  •  metoprolol succinate XL (TOPROL-XL) 24 hr tablet 25 mg, 25 mg, Oral, Q12H, Juli Brandt APRN, 25 mg at 02/09/23 2058  •  metoprolol tartrate (LOPRESSOR) injection 5 mg, 5 mg, Intravenous, Q4H PRN, Albin Barriga MD, 5 mg at 02/08/23 1956  •  nystatin (MYCOSTATIN) 100,000 unit/mL suspension 500,000 Units, 5  mL, Oral, 4x Daily, Charles Diaz MD, 500,000 Units at 02/09/23 2058  •  nystatin (MYCOSTATIN) powder, , Topical, Q12H, Anthony Muñoz MD, Given at 02/09/23 2101  •  ondansetron (ZOFRAN) tablet 4 mg, 4 mg, Oral, Q6H PRN **OR** ondansetron (ZOFRAN) injection 4 mg, 4 mg, Intravenous, Q6H PRN, Jaimee Macias MD, 4 mg at 02/09/23 1423  •  pantoprazole (PROTONIX) EC tablet 40 mg, 40 mg, Oral, Q AM, Tony Santos MD, 40 mg at 02/10/23 0605  •  potassium chloride (K-DUR,KLOR-CON) ER tablet 40 mEq, 40 mEq, Oral, PRN, 40 mEq at 02/07/23 1607 **OR** potassium chloride (KLOR-CON) packet 40 mEq, 40 mEq, Oral, PRN **OR** potassium chloride 10 mEq in 100 mL IVPB, 10 mEq, Intravenous, Q1H PRN, Faustina Barcenas, APRN  •  sertraline (ZOLOFT) tablet 50 mg, 50 mg, Oral, Daily, Anthony Muñoz MD, 50 mg at 02/09/23 0833  •  [COMPLETED] Insert Peripheral IV, , , Once **AND** sodium chloride 0.9 % flush 10 mL, 10 mL, Intravenous, PRN, Jamel Emmanuel II, MD, 10 mL at 02/06/23 2010  •  sodium chloride 0.9 % infusion 1,000 mL, 1,000 mL, Intravenous, Continuous, Guero Ferris MD, Last Rate: 25 mL/hr at 02/09/23 1223, Restarted at 02/09/23 1227  Review of Systems:   Gen: No fever or chills  GI: No abd pain, nausea, vomiting      Objective     Vital Signs  Temp:  [98.4 °F (36.9 °C)-98.9 °F (37.2 °C)] 98.4 °F (36.9 °C)  Heart Rate:  [] 83  Resp:  [18-19] 18  BP: ()/(43-69) 114/58  Body mass index is 27.78 kg/m².    Intake/Output Summary (Last 24 hours) at 2/10/2023 0821  Last data filed at 2/9/2023 1254  Gross per 24 hour   Intake 200 ml   Output --   Net 200 ml     No intake/output data recorded.     Physical Exam:   General: elderly appearing awake, alert and cooperative   Eyes: Normal lids and lashes, no scleral icterus   Skin: warm and dry, not jaundiced   Cardiovascular: regular rate, well-perfused extremities   Pulm: Equal expansion bilaterally, no increased WOB   Abdomen: soft, nontender,  nondistended;               Neuro: A&O, no obvious sign of focal deficit   Psychiatric: Normal mood and behavior       Results Review:     I reviewed the patient's new clinical results.    Results from last 7 days   Lab Units 02/09/23 0805 02/08/23 0406 02/07/23 0339   WBC 10*3/mm3 61.26* 57.95* 49.82*   HEMOGLOBIN g/dL 9.7* 9.2* 9.0*   HEMATOCRIT % 29.0* 27.4* 27.2*   PLATELETS 10*3/mm3 152 220 253     Results from last 7 days   Lab Units 02/09/23  0805 02/08/23 0406 02/07/23 2022 02/07/23 0339 02/06/23 0323 02/05/23 0356 02/04/23  1419   SODIUM mmol/L 142 136  --  139   < > 136 136   POTASSIUM mmol/L 3.7 3.9 4.4 3.0*   < > 3.6 4.3   CHLORIDE mmol/L 101 101  --  101   < > 104 100   CO2 mmol/L 29.2* 26.0  --  26.2   < > 24.1 26.0   BUN mg/dL 17 11  --  6*   < > 11 13   CREATININE mg/dL 0.74 0.84  --  0.73   < > 0.71 0.76   CALCIUM mg/dL 8.7 8.2*  --  8.2*   < > 8.2* 8.6   BILIRUBIN mg/dL  --   --   --   --   --  0.9 0.6   ALK PHOS U/L  --   --   --   --   --  171* 194*   ALT (SGPT) U/L  --   --   --   --   --  41* 52*   AST (SGOT) U/L  --   --   --   --   --  23 35*   GLUCOSE mg/dL 103* 100*  --  90   < > 75 119*    < > = values in this interval not displayed.     Results from last 7 days   Lab Units 02/04/23  1419   INR  1.16*     Lab Results   Lab Value Date/Time    LIPASE 10 (L) 02/04/2023 1419    LIPASE 13 01/23/2023 0216    LIPASE 20 11/21/2022 0250    LIPASE 38 08/31/2022 2131    LIPASE 16.0 02/09/2022 0525    LIPASE 50 06/29/2021 1131       Radiology:  CT abd/pelvis  IMPRESSION:        1. Colonic diverticulosis. No acute inflammatory process of bowel is  identified. Follow-up as indications persist.  2. No nephrolithiasis or hydronephrosis.      Assessment & Plan   Assessment:   1.  Melena: With only minimal decline in her hemoglobin following discharge 1/26.  No further episodes     2.  Acute on chronic anemia: Likely related to above plus myeloproliferative disorder.  She has normocytic  indices     3.  Generalized abdominal pain     4.  A-fib on Eliquis     5.  CAD on Plavix     6.  Myeloproliferative disorder: With significantly elevated WBC     7.  Normal MRI biliary ducts: Minimal intrahepatic prominence     8.  Elevated liver enzymes: Predominantly alkaline phosphatase.  Normal bilirubin.  Nonobstructive pattern. Negative AMA, elevated GGT      Plan:   - 2/9 push enteroscopy and colonoscopy: 3 cm HH, normal stomach, duodenum, lymphangiectasia and normal TI, internal hemorrhoids, diverticulosis   - Endoscopist recommendation to perform VCE in 2 weeks  - CT abd/pelvis following scopes due to abd pain with no acute process  - Hb has been stable in the 9's  - Continue PPI, complete course of nystatin for esophageal candidiasis on recent previous EGD (fluconazole has too many interactions with her meds)  - Continue course of PPI BID, carafate and simethicone  - Has follow up in GI clinic on 2/15/2023 with Faustina Boyer at 2:15  - Okay with discharge from GI standpoint    I discussed the patient's findings and my recommendations with patient and family.

## 2023-02-10 NOTE — PROGRESS NOTES
[facility] INPATIENT PROGRESS NOTE  Subjective     CC: Myeloproliferative disorder      History of present illness:  The patient is a 82 y.o. year old female with medical history significant for JAK2 positive myeloproliferative disorder, perhaps primary myelofibrosis, A-fib on chronic Eliquis, CAD with stents and on Plavix, history of endocarditis and TIA had presented to [facility] ER on 2/4/2023 with abdominal pain and black stools for several days.     Lab work revealed hemoglobin of 6.1, WBC count of 56.36 and platelet count of 385,000.  CT A/P noted no evidence of acute abnormality in the abdomen or pelvis.  Small hiatal hernia.  Previous cholecystectomy.  Sigmoid diverticulosis without evidence of diverticulitis.     Of note, patient was hospitalized around 2 weeks ago with abdominal pain.  MRCP on 1/25/2023 had shown minimal intrahepatic biliary prominence, no CBD/PD dilatation, 5 mm lesion in the body of pancreas possibly representing IPMN.       EGD performed 1/25/2023 had noted mild mucosal changes characterized by slowing in the lower third of the esophagus.  Patchy mild inflammation characterized by congestion and erythema found in the gastric body.  The examined duodenum was normal.     Patient has been seen by GI.  Started on PPI.  Eliquis and Plavix on hold.  Pending clinical course, plan to consider colonoscopy +/- EGD/push enteroscopy.     Hematology/oncology consulted for further evaluation and management.  Patient resting comfortably in bed.  Says she had a BM today which was dark brown in color.  Denies any bleeding from anywhere else.    Initial recommendations include anticoagulation on hold with Eliquis and Plavix, on PPI, Jakafi held, IV iron given, consideration of Hydrea if needed.       Interval history:   2/6/2023  T97.5, pulse 110, respirations 16, BP 97/48, SPO2 94%  Patient, again, states her abdominal pain is now resolved  Patient improved per abdominal  pain.  Again enteroscopy and C-scope with Plavix discontinued for 5 days.  H&H 8.6 and 25.9, white count 43,110, platelet count 305,000    2/7/2023  T90.2, pulse 93, respirations 18,  56  Patient now scheduled for EGD, small bowel enteroscopy and colonoscopy 2/9/2023.  H&H 9.0 and 27.2, white count of 49 820, BUN/creatinine of 6 and 0.73    2/8/2023  T99.1, pulse 90, respirations 18, /58  Record reviewed and endoscopy to proceed to status 9/23, patient agrees  H&H 9.2 and 27.4, white count 24090, platelet count of 220,000    2/9/2022  T90.6, pulse 103, respirations 19, /67, SPO2 of 99%  Patient now being transported for endoscopy  H&H 9.7 and 29.0, white count of 61,260, platelet count 152,000    2/10/2023  T98.1, pulse 93, respirations 18, BP 96/62  Reviewed endoscopic assessment with 3 cm hilar hernia, normal stomach, duodenum, lymphangiectasia and normal terminal ileum.  Discussed with patient restart of Jakafi.       Medications:  The current medication list was reviewed in the EMR.    Allergies:    Allergies   Allergen Reactions   • Aspirin Unknown - Low Severity   • Oxycodone Unknown - Low Severity   • Hydroxyurea Other (See Comments)     Her hemoglobin drop and was stop in February 2022 and start taking Jakafi   • Diphenhydramine Hcl Anxiety and Unknown (See Comments)     Benadryl IVP       Objective      Vitals:    02/10/23 1317   BP: 96/62   Pulse: 93   Resp: 18   Temp: 98.1 °F (36.7 °C)   SpO2: 95%      Physical Exam  Constitutional:       Appearance: Normal appearance.   HENT:      Head: Normocephalic and atraumatic.      Nose: Nose normal.      Mouth/Throat:      Mouth: Mucous membranes are moist.      Pharynx: Oropharynx is clear.   Eyes:      Extraocular Movements: Extraocular movements intact.      Conjunctiva/sclera: Conjunctivae normal.      Pupils: Pupils are equal, round, and reactive to light.   Cardiovascular:      Rate and Rhythm: Normal rate and regular rhythm.      Pulses:  Normal pulses.      Heart sounds: Normal heart sounds.   Pulmonary:      Effort: Pulmonary effort is normal.      Breath sounds: Normal breath sounds.   Abdominal:      General: Bowel sounds are normal.      Palpations: Abdomen is soft. There is no mass.   Musculoskeletal:         General: Normal range of motion.      Cervical back: Normal range of motion and neck supple.   Skin:     General: Skin is warm and dry.   Neurological:      General: No focal deficit present.      Mental Status: She is alert and oriented to person, place, and time.   Psychiatric:         Behavior: Behavior normal.       RECENT LABS:    Results from last 7 days   Lab Units 02/09/23  0805 02/08/23  0406 02/07/23  0339 02/06/23  1604 02/06/23  0323 02/04/23  2335 02/04/23  1419   WBC 10*3/mm3 61.26* 57.95* 49.82*  --  43.11*   < > 56.36*   HEMOGLOBIN g/dL 9.7* 9.2* 9.0*   < > 8.6*   < > 6.1*   HEMATOCRIT % 29.0* 27.4* 27.2*   < > 25.9*   < > 18.4*   PLATELETS 10*3/mm3 152 220 253  --  305   < > 385   MONOCYTES % %  --  1.0*  --   --  1.1*  --  11.0    < > = values in this interval not displayed.     Results from last 7 days   Lab Units 02/09/23  0805 02/08/23  0406 02/07/23 2022 02/07/23  0339 02/06/23  0323 02/05/23  0356 02/04/23  1419   SODIUM mmol/L 142 136  --  139   < > 136 136   POTASSIUM mmol/L 3.7 3.9 4.4 3.0*   < > 3.6 4.3   CHLORIDE mmol/L 101 101  --  101   < > 104 100   CO2 mmol/L 29.2* 26.0  --  26.2   < > 24.1 26.0   BUN mg/dL 17 11  --  6*   < > 11 13   CREATININE mg/dL 0.74 0.84  --  0.73   < > 0.71 0.76   CALCIUM mg/dL 8.7 8.2*  --  8.2*   < > 8.2* 8.6   BILIRUBIN mg/dL  --   --   --   --   --  0.9 0.6   ALK PHOS U/L  --   --   --   --   --  171* 194*   ALT (SGPT) U/L  --   --   --   --   --  41* 52*   AST (SGOT) U/L  --   --   --   --   --  23 35*   GLUCOSE mg/dL 103* 100*  --  90   < > 75 119*    < > = values in this interval not displayed.     Results from last 7 days   Lab Units 02/05/23  0356   MAGNESIUM mg/dL 2.0        Assessment & Plan     Ms. Boyer is a pleasant 82-year-old female with medical history significant for JAK2 positive myeloproliferative disorder, perhaps primary myelofibrosis, A-fib on chronic Eliquis, CAD with stents and on Plavix admitted with GI bleed.     #GI bleed, likely upper:  • Presented with abdominal pain and black stool.  Hemoglobin 6.1 on presentation.  • EGD performed during last admission on 1/25/2023 had noted mild mucosal changes characterized by slowing in the lower third of the esophagus.  Patchy mild inflammation characterized by congestion and erythema found in the gastric body.  The examined duodenum was normal.  Multiple biopsies taken.  • GI on board.  Thinks she may have oozed from the prior biopsy site.  • Anticoagulation with Eliquis and Plavix on hold.  On PPI  • GI follow-up- EGD/push enteroscopy and colonoscopy when Eliquis held 5 days.  This is now proceeding to assess 9/23  • Subsequent studies without evidence of GI bleeding source.     #Severe anemia, likely blood loss related in the setting of primary myelofibrosis:  • Hemoglobin 6.1 on admission.  Iron profile showed normal iron level of 63, ferritin of 115 and iron saturation of 12%.  • Hemoglobin did improve to 7.3 after transfusion, however dropped again to 6.8.  Being transfused 1 unit PRBC.  • Continue transfusion to keep hemoglobin > 7.   • Will hold jakafi as it can be associated with anemia and hemorrhage.  • Will administer IV iron as well.  • Follow-up assessment 2/6/2023-8.6 hemoglobin, status post Ferrlecit 250 mg IV 2/5/2023  • Stable to improved 2/9/2023 with H&H of 9.7 and 29.0.  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 Reinoso in our practice.  • She is currently on Jakafi 15 mg BID as outpatient.  • Given some risk of hemorrhage associated with jakafi,  had plan to hold this but will restart  when patient seen 2/10/2023.     #Leukocytosis, neutrophilic predominant:   • WBC count 56.36 on admission on 2/4/2023  • This is likely myeloproliferative disease related.  His WBC count has ranged between 30- 50,000 since November 2022.  • Last bone marrow biopsy from October 2022 had shown hypercellular marrow at 95% with involvement by chronic myeloproliferative neoplasm, less than 5% blasts.  Mild reticulin fibrosis.  Decreased iron stores.  Consideration of CMML.    • Will monitor for now.  Consider hydroxyurea if worsening leukocytosis.  • Jakafi restarted 2/10/2023     #A-fib: Eliquis on hold     #CAD s/p PCI: Plavix on hold.            Venancio Barajas MD  2/10/2023  14:20 EST

## 2023-02-10 NOTE — PROGRESS NOTES
Name: Catherine Boyer ADMIT: 2023   : 1940  PCP: Kristi Moreau APRN    MRN: [id number] LOS: 6 days   AGE/SEX: 83 y.o. female  ROOM: [facility]     Subjective   Subjective   Had episode of severe abdominal pain last night post EGD and C-scope.  CT scan obtained did not reveal any acute etiology.  Pain is resolved this morning and she is tolerating p.o. diet.    Objective   Objective   Vital Signs  Temp:  [98.4 °F (36.9 °C)-98.9 °F (37.2 °C)] 98.4 °F (36.9 °C)  Heart Rate:  [] 83  Resp:  [18-19] 18  BP: ()/(43-69) 114/58  SpO2:  [90 %-100 %] 90 %  on  Flow (L/min):  [2-4] 4;   Device (Oxygen Therapy): nasal cannula  Body mass index is 27.78 kg/m².  Physical Exam  Constitutional:       General: She is not in acute distress.     Appearance: Normal appearance.   Cardiovascular:      Rate and Rhythm: Normal rate and regular rhythm.      Pulses: Normal pulses.      Heart sounds: No murmur heard.    No gallop.   Pulmonary:      Effort: Pulmonary effort is normal. No respiratory distress.      Breath sounds: Normal breath sounds.   Abdominal:      General: Abdomen is flat. Bowel sounds are normal. There is no distension.      Palpations: Abdomen is soft.      Tenderness: There is no abdominal tenderness. There is no guarding.   Musculoskeletal:      Right lower leg: No edema.      Left lower leg: No edema.   Skin:     General: Skin is warm.   Neurological:      General: No focal deficit present.      Mental Status: She is alert and oriented to person, place, and time.   Psychiatric:         Mood and Affect: Mood normal.         Results Review     I reviewed the patient's new clinical results.  Results from last 7 days   Lab Units 23  0805 23  0406 23  0339 23  0023 23  1604 23  0323   WBC 10*3/mm3 61.26* 57.95* 49.82*  --   --  43.11*   HEMOGLOBIN g/dL 9.7* 9.2* 9.0* 8.6*   < > 8.6*   PLATELETS 10*3/mm3 152 220 253  --   --  305    < > = values in this interval not  displayed.     Results from last 7 days   Lab Units 02/09/23  0805 02/08/23  0406 02/07/23 2022 02/07/23 0339 02/06/23 0323   SODIUM mmol/L 142 136  --  139 136   POTASSIUM mmol/L 3.7 3.9 4.4 3.0* 2.8*   CHLORIDE mmol/L 101 101  --  101 99   CO2 mmol/L 29.2* 26.0  --  26.2 26.9   BUN mg/dL 17 11  --  6* 10   CREATININE mg/dL 0.74 0.84  --  0.73 0.66   GLUCOSE mg/dL 103* 100*  --  90 59*   EGFR mL/min/1.73 79.4 68.6  --  81.2 86.6     Results from last 7 days   Lab Units 02/05/23 0356 02/04/23  1419   ALBUMIN g/dL 3.7 4.1   BILIRUBIN mg/dL 0.9 0.6   ALK PHOS U/L 171* 194*   AST (SGOT) U/L 23 35*   ALT (SGPT) U/L 41* 52*     Results from last 7 days   Lab Units 02/09/23  0805 02/08/23  0406 02/07/23 0339 02/06/23 0323 02/05/23 0356 02/04/23  1419   CALCIUM mg/dL 8.7 8.2* 8.2* 7.8* 8.2* 8.6   ALBUMIN g/dL  --   --   --   --  3.7 4.1   MAGNESIUM mg/dL  --   --   --   --  2.0  --        Glucose   Date/Time Value Ref Range Status   02/10/2023 0607 92 70 - 130 mg/dL Final     Comment:     Meter: [id number] : [id number] Chikis Martinez NA   02/09/2023 2013 153 (H) 70 - 130 mg/dL Final     Comment:     Meter: [id number] : [id number] Chikis Martinez NA   02/09/2023 1609 161 (H) 70 - 130 mg/dL Final     Comment:     Meter: [id number] : [id number] Nirmal Parra CNA   02/09/2023 1108 105 70 - 130 mg/dL Final     Comment:     Meter: [id number] : [id number] Nirmal Parra CNA   02/09/2023 0601 119 70 - 130 mg/dL Final     Comment:     Meter: [id number] : [id number] Chikis Martinez NA   02/08/2023 2018 121 70 - 130 mg/dL Final     Comment:     Meter: [id number] : [id number] Chikis Michelle NA   02/08/2023 1618 134 (H) 70 - 130 mg/dL Final     Comment:     Meter: [id number] : [id number] Jean-Pierre Moreira CNA       CT Abdomen Without Contrast    Result Date: 2/9/2023    1. Colonic diverticulosis. No acute inflammatory process of bowel is identified. Follow-up as indications persist. 2. No  nephrolithiasis or hydronephrosis.  This report was finalized on 2/9/2023 8:03 PM by Dr. Reilly Ritter M.D.      Scheduled Medications  atorvastatin, 80 mg, Oral, Nightly  empagliflozin, 10 mg, Oral, Daily  furosemide, 40 mg, Oral, Daily  insulin lispro, 0-7 Units, Subcutaneous, TID AC  latanoprost, 1 drop, Both Eyes, Nightly  metoprolol succinate XL, 25 mg, Oral, Q12H  nystatin, 5 mL, Oral, 4x Daily  nystatin, , Topical, Q12H  pantoprazole, 40 mg, Oral, Q AM  sertraline, 50 mg, Oral, Daily    Infusions  lactated ringers, 30 mL/hr  sodium chloride, 1,000 mL, Last Rate: 25 mL/hr at 02/09/23 1223    Diet  NPO Diet NPO Type: Strict NPO       Assessment/Plan     Active Hospital Problems    Diagnosis  POA   • **ABLA (acute blood loss anemia) [D62]  Yes   • Atherosclerosis of abdominal aorta (HCC) [I70.0]  Yes   • Chronic diastolic (congestive) heart failure (HCC) [I50.32]  Yes   • Personal history of transient ischemic attack (TIA), 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 deficits [Z86.73]  Not Applicable   • CAD (coronary artery disease) [I25.10]  Yes   • Myeloproliferative disorder (HCC) [D47.1]  Yes   • Paroxysmal atrial fibrillation (HCC) [I48.0]  Yes   • Type 2 diabetes mellitus with circulatory disorder, without long-term current use of insulin (HCC) [E11.59]  Yes      Resolved Hospital Problems    Diagnosis Date Resolved POA   • Hypokalemia [E87.6] 02/08/2023 Unknown       83 y.o. female admitted with ABLA (acute blood loss anemia).      02/10/23  Plan to monitor p.o. intake today and assess for pain, hopeful discharge tomorrow morning.    Acute blood loss anemia, resolved  Melena, resolved  -Hb 6.1 OA > 8.6 > 9 > 9.7  -GI following  -push EGD (2/9/23): 3 cm hiatal hernia  -c-scope (2/9/23) :Nonbleeding internal hemorrhoids; diverticulosis  -Planning for VCE in about 2 weeks as OP  -Continue PPI and finish nystatin for esophageal candidiasis on previous EGD  -Per cardiology at this time will discontinue  Eliquis given recurrent bleeds  -Plan to resume ASA, Plavix through 3/2/2023, then Plavix alone    Chronic diastolic heart failure  -TTE (9/1/22): EF 57%   -Cardiology following  -Continue Lasix 40 mg    DM2  -Last A1c 6.7 (11/28/22)  -cont Jardiance  -SSI    Myeloproliferative disorder  Anemia, macrocytic  Leukocytosis  -On Jakafi 15 mg twice daily-holding given association with hemorrhage  -Hematology following  -WBC continues to climb, 57 > 61 (2/9/23)    CAD  -s/p 2 LEFTY 2/2022; LEFTY to diagonal a. 9/2/22 for ISR  -continue atorvastatin 80mg  -Plan to resume ASA, Plavix through 3/2/2023, then Plavix alone     Paroxysmal Afib  -RC: Metoprolol 25 mg  -AC: Apixaban, on hold      HTN  -metoprolol 25mg BID    · Eliquis (home med) for DVT prophylaxis ON HOLD  · Full code.  · Discussed with patient and care team on multidisciplinary rounds.  · Anticipate discharge Return to [facility] when cleared by consultants      Anthony Muñoz MD  [facility]  02/10/23  07:42 EST

## 2023-02-11 ENCOUNTER — APPOINTMENT (OUTPATIENT)
Dept: GENERAL RADIOLOGY | Facility: HOSPITAL | Age: 83
DRG: 378 | End: 2023-02-11
Payer: MEDICARE

## 2023-02-11 PROBLEM — D62 ABLA (ACUTE BLOOD LOSS ANEMIA): Status: RESOLVED | Noted: 2023-02-04 | Resolved: 2023-02-11

## 2023-02-11 LAB
ANISOCYTOSIS BLD QL: ABNORMAL
DEPRECATED RDW RBC AUTO: 51.7 FL (ref 37–54)
ERYTHROCYTE [DISTWIDTH] IN BLOOD BY AUTOMATED COUNT: 15.2 % (ref 12.3–15.4)
GLUCOSE BLDC GLUCOMTR-MCNC: 130 MG/DL (ref 70–130)
GLUCOSE BLDC GLUCOMTR-MCNC: 144 MG/DL (ref 70–130)
GLUCOSE BLDC GLUCOMTR-MCNC: 175 MG/DL (ref 70–130)
GLUCOSE BLDC GLUCOMTR-MCNC: 195 MG/DL (ref 70–130)
HCT VFR BLD AUTO: 28.5 % (ref 34–46.6)
HGB BLD-MCNC: 9.5 G/DL (ref 12–15.9)
HYPOCHROMIA BLD QL: ABNORMAL
LYMPHOCYTES # BLD MANUAL: 2.58 10*3/MM3 (ref 0.7–3.1)
LYMPHOCYTES NFR BLD MANUAL: 14.1 % (ref 5–12)
MACROCYTES BLD QL SMEAR: ABNORMAL
MCH RBC QN AUTO: 31.6 PG (ref 26.6–33)
MCHC RBC AUTO-ENTMCNC: 33.3 G/DL (ref 31.5–35.7)
MCV RBC AUTO: 94.7 FL (ref 79–97)
MICROCYTES BLD QL: ABNORMAL
MONOCYTES # BLD: 5.96 10*3/MM3 (ref 0.1–0.9)
MYELOCYTES NFR BLD MANUAL: 1 % (ref 0–0)
NEUTROPHILS # BLD AUTO: 33.33 10*3/MM3 (ref 1.7–7)
NEUTROPHILS NFR BLD MANUAL: 78.8 % (ref 42.7–76)
OVALOCYTES BLD QL SMEAR: ABNORMAL
PLAT MORPH BLD: NORMAL
PLATELET # BLD AUTO: 138 10*3/MM3 (ref 140–450)
PMV BLD AUTO: 13.2 FL (ref 6–12)
POIKILOCYTOSIS BLD QL SMEAR: ABNORMAL
POLYCHROMASIA BLD QL SMEAR: ABNORMAL
RBC # BLD AUTO: 3.01 10*6/MM3 (ref 3.77–5.28)
VARIANT LYMPHS NFR BLD MANUAL: 6.1 % (ref 19.6–45.3)
WBC MORPH BLD: NORMAL
WBC NRBC COR # BLD: 42.3 10*3/MM3 (ref 3.4–10.8)

## 2023-02-11 PROCEDURE — 85025 COMPLETE CBC W/AUTO DIFF WBC: CPT | Performed by: INTERNAL MEDICINE

## 2023-02-11 PROCEDURE — 74018 RADEX ABDOMEN 1 VIEW: CPT

## 2023-02-11 PROCEDURE — 63710000001 RUXOLITINIB 15 MG TABLET: Performed by: INTERNAL MEDICINE

## 2023-02-11 PROCEDURE — 99232 SBSQ HOSP IP/OBS MODERATE 35: CPT | Performed by: INTERNAL MEDICINE

## 2023-02-11 PROCEDURE — 99222 1ST HOSP IP/OBS MODERATE 55: CPT | Performed by: INTERNAL MEDICINE

## 2023-02-11 PROCEDURE — 85007 BL SMEAR W/DIFF WBC COUNT: CPT | Performed by: INTERNAL MEDICINE

## 2023-02-11 PROCEDURE — C9399 UNCLASSIFIED DRUGS OR BIOLOG: HCPCS | Performed by: INTERNAL MEDICINE

## 2023-02-11 PROCEDURE — 25010000002 HYDROMORPHONE PER 4 MG: Performed by: STUDENT IN AN ORGANIZED HEALTH CARE EDUCATION/TRAINING PROGRAM

## 2023-02-11 PROCEDURE — 25010000002 ONDANSETRON PER 1 MG: Performed by: INTERNAL MEDICINE

## 2023-02-11 PROCEDURE — 82962 GLUCOSE BLOOD TEST: CPT

## 2023-02-11 RX ORDER — SIMETHICONE 80 MG
80 TABLET,CHEWABLE ORAL 4 TIMES DAILY PRN
Qty: 100 TABLET | Refills: 0 | Status: SHIPPED | OUTPATIENT
Start: 2023-02-11

## 2023-02-11 RX ORDER — SUCRALFATE 1 G/1
1 TABLET ORAL
Qty: 53 TABLET | Refills: 0 | Status: SHIPPED | OUTPATIENT
Start: 2023-02-11 | End: 2023-02-15 | Stop reason: SDUPTHER

## 2023-02-11 RX ORDER — ASPIRIN 81 MG/1
81 TABLET ORAL DAILY
Qty: 23 TABLET | Refills: 0 | Status: SHIPPED | OUTPATIENT
Start: 2023-02-11 | End: 2023-03-06

## 2023-02-11 RX ADMIN — NYSTATIN 500000 UNITS: 100000 SUSPENSION ORAL at 17:00

## 2023-02-11 RX ADMIN — PANTOPRAZOLE SODIUM 40 MG: 40 TABLET, DELAYED RELEASE ORAL at 06:07

## 2023-02-11 RX ADMIN — NYSTATIN 500000 UNITS: 100000 SUSPENSION ORAL at 20:45

## 2023-02-11 RX ADMIN — HYDROMORPHONE HYDROCHLORIDE 0.5 MG: 1 INJECTION, SOLUTION INTRAMUSCULAR; INTRAVENOUS; SUBCUTANEOUS at 18:35

## 2023-02-11 RX ADMIN — PANTOPRAZOLE SODIUM 40 MG: 40 TABLET, DELAYED RELEASE ORAL at 16:57

## 2023-02-11 RX ADMIN — LATANOPROST 1 DROP: 50 SOLUTION OPHTHALMIC at 20:45

## 2023-02-11 RX ADMIN — SUCRALFATE 1 G: 1 TABLET ORAL at 16:57

## 2023-02-11 RX ADMIN — HYDROMORPHONE HYDROCHLORIDE 0.5 MG: 1 INJECTION, SOLUTION INTRAMUSCULAR; INTRAVENOUS; SUBCUTANEOUS at 10:42

## 2023-02-11 RX ADMIN — SUCRALFATE 1 G: 1 TABLET ORAL at 20:45

## 2023-02-11 RX ADMIN — METOPROLOL SUCCINATE 25 MG: 25 TABLET, EXTENDED RELEASE ORAL at 20:45

## 2023-02-11 RX ADMIN — NYSTATIN: 100000 POWDER TOPICAL at 20:45

## 2023-02-11 RX ADMIN — ATORVASTATIN CALCIUM 80 MG: 80 TABLET, FILM COATED ORAL at 20:45

## 2023-02-11 RX ADMIN — RUXOLITINIB 15 MG: 15 TABLET ORAL at 20:45

## 2023-02-11 RX ADMIN — ONDANSETRON 4 MG: 2 INJECTION INTRAMUSCULAR; INTRAVENOUS at 08:52

## 2023-02-11 NOTE — PROGRESS NOTES
[facility] INPATIENT PROGRESS NOTE  Subjective     CC: Myeloproliferative disorder      History of present illness:  The patient is a 82 y.o. year old female with medical history significant for JAK2 positive myeloproliferative disorder, perhaps primary myelofibrosis, A-fib on chronic Eliquis, CAD with stents and on Plavix, history of endocarditis and TIA had presented to [facility] ER on 2/4/2023 with abdominal pain and black stools for several days.     Lab work revealed hemoglobin of 6.1, WBC count of 56.36 and platelet count of 385,000.  CT A/P noted no evidence of acute abnormality in the abdomen or pelvis.  Small hiatal hernia.  Previous cholecystectomy.  Sigmoid diverticulosis without evidence of diverticulitis.     Of note, patient was hospitalized around 2 weeks ago with abdominal pain.  MRCP on 1/25/2023 had shown minimal intrahepatic biliary prominence, no CBD/PD dilatation, 5 mm lesion in the body of pancreas possibly representing IPMN.       EGD performed 1/25/2023 had noted mild mucosal changes characterized by slowing in the lower third of the esophagus.  Patchy mild inflammation characterized by congestion and erythema found in the gastric body.  The examined duodenum was normal.     Patient has been seen by GI.  Started on PPI.  Eliquis and Plavix on hold.  Pending clinical course, plan to consider colonoscopy +/- EGD/push enteroscopy.     Hematology/oncology consulted for further evaluation and management.  Patient resting comfortably in bed.  Says she had a BM today which was dark brown in color.  Denies any bleeding from anywhere else.    Initial recommendations include anticoagulation on hold with Eliquis and Plavix, on PPI, Jakafi held, IV iron given, consideration of Hydrea if needed.       Interval history:   2/6/2023  T97.5, pulse 110, respirations 16, BP 97/48, SPO2 94%  Patient, again, states her abdominal pain is now resolved  Patient improved per abdominal  pain.  Again enteroscopy and C-scope with Plavix discontinued for 5 days.  H&H 8.6 and 25.9, white count 43,110, platelet count 305,000    2/7/2023  T90.2, pulse 93, respirations 18,  56  Patient now scheduled for EGD, small bowel enteroscopy and colonoscopy 2/9/2023.  H&H 9.0 and 27.2, white count of 49 820, BUN/creatinine of 6 and 0.73    2/8/2023  T99.1, pulse 90, respirations 18, /58  Record reviewed and endoscopy to proceed to status 9/23, patient agrees  H&H 9.2 and 27.4, white count 30077, platelet count of 220,000    2/9/2022  T90.6, pulse 103, respirations 19, /67, SPO2 of 99%  Patient now being transported for endoscopy  H&H 9.7 and 29.0, white count of 61,260, platelet count 152,000    2/10/2023  T98.1, pulse 93, respirations 18, BP 96/62  Reviewed endoscopic assessment with 3 cm hilar hernia, normal stomach, duodenum, lymphangiectasia and normal terminal ileum.  Discussed with patient restart of Jakafi.    2/11/2023  T 98 degrees, pulse 76, respirations 18, /66  Currently resting  H&H 9.5 and 28.5, white count 42,300, platelet count of 138,000       Medications:  The current medication list was reviewed in the EMR.    Allergies:    Allergies   Allergen Reactions   • Aspirin Unknown - Low Severity   • Oxycodone Unknown - Low Severity   • Hydroxyurea Other (See Comments)     Her hemoglobin drop and was stop in February 2022 and start taking Jakafi   • Diphenhydramine Hcl Anxiety and Unknown (See Comments)     Benadryl IVP       Objective      Vitals:    02/11/23 0738   BP: 131/66   Pulse: 76   Resp: 18   Temp: 98 °F (36.7 °C)   SpO2: 98%      Physical Exam  Constitutional:       Appearance: Normal appearance.   HENT:      Head: Normocephalic and atraumatic.      Nose: Nose normal.      Mouth/Throat:      Mouth: Mucous membranes are moist.      Pharynx: Oropharynx is clear.   Eyes:      Extraocular Movements: Extraocular movements intact.      Conjunctiva/sclera: Conjunctivae normal.       Pupils: Pupils are equal, round, and reactive to light.   Cardiovascular:      Rate and Rhythm: Normal rate and regular rhythm.      Pulses: Normal pulses.      Heart sounds: Normal heart sounds.   Pulmonary:      Effort: Pulmonary effort is normal.      Breath sounds: Normal breath sounds.   Abdominal:      General: Bowel sounds are normal.      Palpations: Abdomen is soft. There is no mass.   Musculoskeletal:         General: Normal range of motion.      Cervical back: Normal range of motion and neck supple.   Skin:     General: Skin is warm and dry.   Neurological:      General: No focal deficit present.      Mental Status: She is alert and oriented to person, place, and time.   Psychiatric:         Behavior: Behavior normal.       RECENT LABS:    Results from last 7 days   Lab Units 02/11/23  0000 02/09/23  0805 02/08/23  0406 02/06/23  1604 02/06/23  0323   WBC 10*3/mm3 42.30* 61.26* 57.95*   < > 43.11*   HEMOGLOBIN g/dL 9.5* 9.7* 9.2*   < > 8.6*   HEMATOCRIT % 28.5* 29.0* 27.4*   < > 25.9*   PLATELETS 10*3/mm3 138* 152 220   < > 305   MONOCYTES % % 14.1*  --  1.0*  --  1.1*    < > = values in this interval not displayed.     Results from last 7 days   Lab Units 02/09/23  0805 02/08/23  0406 02/07/23  2022 02/07/23  0339 02/06/23  0323 02/05/23  0356 02/04/23  1419   SODIUM mmol/L 142 136  --  139   < > 136 136   POTASSIUM mmol/L 3.7 3.9 4.4 3.0*   < > 3.6 4.3   CHLORIDE mmol/L 101 101  --  101   < > 104 100   CO2 mmol/L 29.2* 26.0  --  26.2   < > 24.1 26.0   BUN mg/dL 17 11  --  6*   < > 11 13   CREATININE mg/dL 0.74 0.84  --  0.73   < > 0.71 0.76   CALCIUM mg/dL 8.7 8.2*  --  8.2*   < > 8.2* 8.6   BILIRUBIN mg/dL  --   --   --   --   --  0.9 0.6   ALK PHOS U/L  --   --   --   --   --  171* 194*   ALT (SGPT) U/L  --   --   --   --   --  41* 52*   AST (SGOT) U/L  --   --   --   --   --  23 35*   GLUCOSE mg/dL 103* 100*  --  90   < > 75 119*    < > = values in this interval not displayed.     Results from  last 7 days   Lab Units 02/05/23  0356   MAGNESIUM mg/dL 2.0       Assessment & Plan     Ms. Boyer is a pleasant 82-year-old female with medical history significant for JAK2 positive myeloproliferative disorder, perhaps primary myelofibrosis, A-fib on chronic Eliquis, CAD with stents and on Plavix admitted with GI bleed.     #GI bleed, likely upper:  • Presented with abdominal pain and black stool.  Hemoglobin 6.1 on presentation.  • EGD performed during last admission on 1/25/2023 had noted mild mucosal changes characterized by slowing in the lower third of the esophagus.  Patchy mild inflammation characterized by congestion and erythema found in the gastric body.  The examined duodenum was normal.  Multiple biopsies taken.  • GI on board.  Thinks she may have oozed from the prior biopsy site.  • Anticoagulation with Eliquis and Plavix on hold.  On PPI  • GI follow-up- EGD/push enteroscopy and colonoscopy when Eliquis held 5 days.  This is now proceeding to assess 9/23  • Subsequent studies without evidence of GI bleeding source.     #Severe anemia, likely blood loss related in the setting of primary myelofibrosis:  • Hemoglobin 6.1 on admission.  Iron profile showed normal iron level of 63, ferritin of 115 and iron saturation of 12%.  • Hemoglobin did improve to 7.3 after transfusion, however dropped again to 6.8.  Being transfused 1 unit PRBC.  • Continue transfusion to keep hemoglobin > 7.   • Will hold jakafi as it can be associated with anemia and hemorrhage.  • Will administer IV iron as well.  • Follow-up assessment 2/6/2023-8.6 hemoglobin, status post Ferrlecit 250 mg IV 2/5/2023  • Stable to improved 2/9/2023 with H&H of 9.7 and 29.0.  • Patient seen 2/10/2023, Jakafi restarted  • Patient seen 2/11/2023 stable with improvement per leukocytosis as well.     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 Reinoso in our practice.  • She is  currently on Jakafi 15 mg BID as outpatient.  • Given some risk of hemorrhage associated with jakafi,  had plan to hold this but will restart when patient seen 2/10/2023.  Stable and assessed 2/11/2023     #Leukocytosis, neutrophilic predominant:   • WBC count 56.36 on admission on 2/4/2023  • This is likely myeloproliferative disease related.  His WBC count has ranged between 30- 50,000 since November 2022.  • Last bone marrow biopsy from October 2022 had shown hypercellular marrow at 95% with involvement by chronic myeloproliferative neoplasm, less than 5% blasts.  Mild reticulin fibrosis.  Decreased iron stores.  Consideration of CMML.    • Will monitor for now.  Consider hydroxyurea if worsening leukocytosis.  • Jakafi restarted 2/10/2023 with stabilization of white count.  No plans for Hydrea necessary.     #A-fib: Eliquis on hold     #CAD s/p PCI: Plavix on hold.    *The patient could be discharged from our standpoint.  I will make follow-up appointments in our office in the next 4 to 6 weeks.  We will sign off at this point.  Thank you.          Venancio Barajas MD  2/11/2023  12:10 EST

## 2023-02-11 NOTE — PROGRESS NOTES
Name: Catherine Boyer ADMIT: 2023   : 1940  PCP: Kristi Moreau APRN    MRN: [id number] LOS: 7 days   AGE/SEX: 83 y.o. female  ROOM: [facility]     Subjective   Subjective   She was feeling really good yesterday and tolerate all her meals.  However this morning she started having abrupt onset abdominal pain as well as nausea.    Objective   Objective   Vital Signs  Temp:  [97.3 °F (36.3 °C)-98.1 °F (36.7 °C)] 98 °F (36.7 °C)  Heart Rate:  [] 76  Resp:  [16-18] 18  BP: ()/(62-79) 131/66  SpO2:  [95 %-100 %] 98 %  on  Flow (L/min):  [2] 2;   Device (Oxygen Therapy): nasal cannula  Body mass index is 27.78 kg/m².  Physical Exam  Constitutional:       General: She is in acute distress.      Appearance: Normal appearance. She is ill-appearing.   Cardiovascular:      Rate and Rhythm: Normal rate and regular rhythm.      Pulses: Normal pulses.      Heart sounds: No murmur heard.    No gallop.   Pulmonary:      Effort: Pulmonary effort is normal. No respiratory distress.      Breath sounds: Normal breath sounds.   Abdominal:      General: Abdomen is flat. Bowel sounds are normal. There is no distension.      Palpations: Abdomen is soft. There is no mass.      Tenderness: There is no abdominal tenderness. There is no guarding or rebound.      Hernia: No hernia is present.   Musculoskeletal:      Right lower leg: No edema.      Left lower leg: No edema.   Skin:     General: Skin is warm.   Neurological:      General: No focal deficit present.      Mental Status: She is alert and oriented to person, place, and time.   Psychiatric:         Mood and Affect: Mood normal.         Results Review     I reviewed the patient's new clinical results.  Results from last 7 days   Lab Units 23  0000 23  0805 23  0406 23  0339   WBC 10*3/mm3 42.30* 61.26* 57.95* 49.82*   HEMOGLOBIN g/dL 9.5* 9.7* 9.2* 9.0*   PLATELETS 10*3/mm3 138* 152 220 253     Results from last 7 days   Lab Units  02/09/23  0805 02/08/23  0406 02/07/23 2022 02/07/23 0339 02/06/23  0323   SODIUM mmol/L 142 136  --  139 136   POTASSIUM mmol/L 3.7 3.9 4.4 3.0* 2.8*   CHLORIDE mmol/L 101 101  --  101 99   CO2 mmol/L 29.2* 26.0  --  26.2 26.9   BUN mg/dL 17 11  --  6* 10   CREATININE mg/dL 0.74 0.84  --  0.73 0.66   GLUCOSE mg/dL 103* 100*  --  90 59*   EGFR mL/min/1.73 79.4 68.6  --  81.2 86.6     Results from last 7 days   Lab Units 02/05/23  0356 02/04/23  1419   ALBUMIN g/dL 3.7 4.1   BILIRUBIN mg/dL 0.9 0.6   ALK PHOS U/L 171* 194*   AST (SGOT) U/L 23 35*   ALT (SGPT) U/L 41* 52*     Results from last 7 days   Lab Units 02/09/23  0805 02/08/23 0406 02/07/23 0339 02/06/23 0323 02/05/23 0356 02/04/23  1419   CALCIUM mg/dL 8.7 8.2* 8.2* 7.8* 8.2* 8.6   ALBUMIN g/dL  --   --   --   --  3.7 4.1   MAGNESIUM mg/dL  --   --   --   --  2.0  --        Glucose   Date/Time Value Ref Range Status   02/11/2023 0645 130 70 - 130 mg/dL Final     Comment:     Meter: [id number] : [id number] Bassam Hernandez NA   02/10/2023 2057 153 (H) 70 - 130 mg/dL Final     Comment:     Meter: [id number] : [id number] Bassam Hernandez NA   02/10/2023 1613 136 (H) 70 - 130 mg/dL Final     Comment:     Meter: [id number] : [id number] Bryn BENTLEY   02/10/2023 1103 170 (H) 70 - 130 mg/dL Final     Comment:     Meter: [id number] : [id number] Bryn Sandoval NA   02/10/2023 0607 92 70 - 130 mg/dL Final     Comment:     Meter: [id number] : [id number] Chikis Martinez NA   02/09/2023 2013 153 (H) 70 - 130 mg/dL Final     Comment:     Meter: [id number] : [id number] Chikis Martinez NA   02/09/2023 1609 161 (H) 70 - 130 mg/dL Final     Comment:     Meter: [id number] : [id number] Nirmal Parra CNA       CT Abdomen Without Contrast    Result Date: 2/9/2023    1. Colonic diverticulosis. No acute inflammatory process of bowel is identified. Follow-up as indications persist. 2. No nephrolithiasis or hydronephrosis.  This report was  finalized on 2/9/2023 8:03 PM by Dr. Reilly Ritter M.D.      Scheduled Medications  aspirin, 81 mg, Oral, Daily  atorvastatin, 80 mg, Oral, Nightly  clopidogrel, 75 mg, Oral, Daily  empagliflozin, 10 mg, Oral, Daily  furosemide, 40 mg, Oral, Daily  insulin lispro, 0-7 Units, Subcutaneous, TID AC  latanoprost, 1 drop, Both Eyes, Nightly  metoprolol succinate XL, 25 mg, Oral, Q12H  nystatin, 5 mL, Oral, 4x Daily  nystatin, , Topical, Q12H  pantoprazole, 40 mg, Oral, BID AC  ruxolitinib, 15 mg, Oral, BID  sertraline, 50 mg, Oral, Daily  sucralfate, 1 g, Oral, 4x Daily AC & at Bedtime    Infusions  lactated ringers, 30 mL/hr    Diet  Diet: Diabetic Diets; Consistent Carbohydrate; Texture: Regular Texture (IDDSI 7); Fluid Consistency: Thin (IDDSI 0)       Assessment/Plan     Active Hospital Problems    Diagnosis  POA   • Atherosclerosis of abdominal aorta (HCC) [I70.0]  Yes   • Chronic diastolic (congestive) heart failure (HCC) [I50.32]  Yes   • Personal history of transient ischemic attack (TIA), 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 deficits [Z86.73]  Not Applicable   • CAD (coronary artery disease) [I25.10]  Yes   • Myeloproliferative disorder (HCC) [D47.1]  Yes   • Paroxysmal atrial fibrillation (HCC) [I48.0]  Yes   • Type 2 diabetes mellitus with circulatory disorder, 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 female admitted with ABLA (acute blood loss anemia).      02/11/23  Abdomen is nonsurgical.  Will obtain KUB to evaluate symptoms.    ABD pain  Nausea  -Unclear etiology.  She had similar episode yesterday that resolved quickly which was attributed to gas from endoscopy.  Nonsurgical abdomen.  Does not sound like reflux.  -Check KUB    Acute blood loss anemia, resolved  Melena, resolved  -Hb 6.1 OA > 8.6 > 9 >  9.7  -GI following  -push EGD (2/9/23): 3 cm hiatal hernia  -c-scope (2/9/23) :Nonbleeding internal hemorrhoids; diverticulosis  -Planning for VCE in about 2 weeks as OP  -Continue PPI and finish nystatin for esophageal candidiasis on previous EGD  -Per cardiology at this time will discontinue Eliquis given recurrent bleeds  -Plan to resume ASA, Plavix through 3/2/2023, then Plavix alone    Chronic diastolic heart failure  -TTE (9/1/22): EF 57%   -Cardiology following  -Continue Lasix 40 mg    DM2  -Last A1c 6.7 (11/28/22)  -cont Jardiance  -SSI    Myeloproliferative disorder  Anemia, macrocytic  Leukocytosis  -On Jakafi 15 mg twice daily-holding given association with hemorrhage  -Hematology following  -WBC continues to climb, 57 > 61 (2/9/23)    CAD  -s/p 2 LEFTY 2/2022; LEFTY to diagonal a. 9/2/22 for ISR  -continue atorvastatin 80mg  -Plan to resume ASA, Plavix through 3/2/2023, then Plavix alone     Paroxysmal Afib  -RC: Metoprolol 25 mg  -AC: Apixaban-  plan to DC given bleeding risk.  Cardiology to reassess in outpatient setting     HTN  -metoprolol 25mg BID    · SCDs for DVT prophylaxis  · Full code.  · Discussed with patient, family and nursing staff.  · Anticipate discharge Return to [facility] when cleared by consultants      Anthony Muñoz MD  [facility]  02/11/23  11:08 EST

## 2023-02-11 NOTE — PLAN OF CARE
Goal Outcome Evaluation:   Patient alert follows commands self turn. Skin care provided. No c/o pain or nausea noted no acute distress noted will continue to monitor

## 2023-02-11 NOTE — NURSING NOTE
0917:Patient very nauseous and unable to tolerate anything oral this morning. No a.m. meds given at this time related to the nausea per patient. IV zofran given and simethicone ordered but patient says she is too nauseous to take. Patient dry-heaving and reporting that she is having abdominal pain to upper quadrants. Abdomen soft and rounded and non-tender. Bowel sounds audible. BM x 1 this a.m. MD aware.    1117a.m.: Patient discharge cancelled at this time related to severe nausea and abdominal pain to upper quadrants. Abdomen remains soft and round and nontender to touch.  Awaiting KUB.

## 2023-02-11 NOTE — PROGRESS NOTES
[facility]  Inpatient Progress Note    Reason for Follow Up: Abdominal pain    Subjective     Interval History:   Hemoglobin 9.5.  Patient described abdominal cramping earlier today, KUB essentially negative for any obstructive process.  She is scheduled for follow-up with the nurse practitioner on the 15th of this month.    Current Facility-Administered Medications:   •  acetaminophen (TYLENOL) tablet 650 mg, 650 mg, Oral, Q4H PRN, Jaimee Macias MD, 650 mg at 02/08/23 0922  •  aspirin EC tablet 81 mg, 81 mg, Oral, Daily, Anthony Muñoz MD, 81 mg at 02/10/23 1518  •  atorvastatin (LIPITOR) tablet 80 mg, 80 mg, Oral, Nightly, Jaimee Macias MD, 80 mg at 02/10/23 2022  •  clopidogrel (PLAVIX) tablet 75 mg, 75 mg, Oral, Daily, Anthony Muñoz MD, 75 mg at 02/10/23 1518  •  dextrose (D50W) (25 g/50 mL) IV injection 25 g, 25 g, Intravenous, Q15 Min PRN, Orlando Sewell MD  •  dextrose (GLUTOSE) oral gel 15 g, 15 g, Oral, Q15 Min PRN, Orlando Sewell MD  •  empagliflozin (JARDIANCE) tablet 10 mg, 10 mg, Oral, Daily, Jaimee Macias MD, 10 mg at 02/08/23 1049  •  furosemide (LASIX) tablet 40 mg, 40 mg, Oral, Daily, Jaimee Macias MD, 40 mg at 02/10/23 0851  •  glucagon (GLUCAGEN) injection 1 mg, 1 mg, Intramuscular, Q15 Min PRN, Orlando Sewell MD  •  HYDROmorphone (DILAUDID) injection 0.5 mg, 0.5 mg, Intravenous, Q2H PRN, Anthony Muñoz MD, 0.5 mg at 02/11/23 1042  •  insulin lispro (ADMELOG) injection 0-7 Units, 0-7 Units, Subcutaneous, TID AC, Orlando Sewell MD, 2 Units at 02/10/23 1206  •  lactated ringers infusion, 30 mL/hr, Intravenous, Continuous, Jd,  A, MD  •  latanoprost (XALATAN) 0.005 % ophthalmic solution 1 drop, 1 drop, Both Eyes, Nightly, Jaimee Macias MD, 1 drop at 02/10/23 2138  •  melatonin tablet 3 mg, 3 mg, Oral, Nightly PRN, Jaimee Macias MD, 3 mg at 02/06/23 5425  •   metoprolol succinate XL (TOPROL-XL) 24 hr tablet 25 mg, 25 mg, Oral, Q12H, Juli Brandt, APRN, 25 mg at 02/10/23 2018  •  metoprolol tartrate (LOPRESSOR) injection 5 mg, 5 mg, Intravenous, Q4H PRN, Albin Barriga MD, 5 mg at 02/08/23 1956  •  nystatin (MYCOSTATIN) 100,000 unit/mL suspension 500,000 Units, 5 mL, Oral, 4x Daily, Charles Diaz MD, 500,000 Units at 02/10/23 2018  •  nystatin (MYCOSTATIN) powder, , Topical, Q12H, Anthony Muñoz MD, Given at 02/10/23 2018  •  ondansetron (ZOFRAN) tablet 4 mg, 4 mg, Oral, Q6H PRN **OR** ondansetron (ZOFRAN) injection 4 mg, 4 mg, Intravenous, Q6H PRN, Jaimee Macias MD, 4 mg at 02/11/23 0852  •  pantoprazole (PROTONIX) EC tablet 40 mg, 40 mg, Oral, BID AC, Charles Diaz MD, 40 mg at 02/11/23 0607  •  potassium chloride (K-DUR,KLOR-CON) ER tablet 40 mEq, 40 mEq, Oral, PRN, 40 mEq at 02/07/23 1607 **OR** potassium chloride (KLOR-CON) packet 40 mEq, 40 mEq, Oral, PRN **OR** potassium chloride 10 mEq in 100 mL IVPB, 10 mEq, Intravenous, Q1H PRN, Faustina Barcenas, APRN  •  ruxolitinib (JAKAFI) chemo tablet 15 mg, 15 mg, Oral, BID, Venancio Barajas MD, 15 mg at 02/10/23 1649  •  sertraline (ZOLOFT) tablet 50 mg, 50 mg, Oral, Daily, Anthony Muñoz MD, 50 mg at 02/10/23 0851  •  simethicone (MYLICON) chewable tablet 80 mg, 80 mg, Oral, 4x Daily PRN, Charles Diaz MD  •  [COMPLETED] Insert Peripheral IV, , , Once **AND** sodium chloride 0.9 % flush 10 mL, 10 mL, Intravenous, PRN, Jamel Emmanuel II, MD, 10 mL at 02/06/23 2010  •  sucralfate (CARAFATE) tablet 1 g, 1 g, Oral, 4x Daily AC & at Bedtime, Charles Diaz MD, 1 g at 02/10/23 2018  Review of Systems:    All systems were reviewed and negative except for:  Gastrointestinal: positive for  See HPI    Objective     Vital Signs  Temp:  [97.3 °F (36.3 °C)-98 °F (36.7 °C)] 97.8 °F (36.6 °C)  Heart Rate:  [] 99  Resp:  [16-18] 18  BP: (112-131)/(61-79) 121/61  Body mass index is 27.78  kg/m².    Intake/Output Summary (Last 24 hours) at 2/11/2023 1540  Last data filed at 2/10/2023 2100  Gross per 24 hour   Intake 100 ml   Output --   Net 100 ml     No intake/output data recorded.     Physical Exam:   General: patient awake, alert and cooperative   Eyes: Normal lids and lashes, no scleral icterus   Neck: supple, normal ROM   Skin: warm and dry, not jaundiced   Cardiovascular: regular rhythm and rate, no murmurs auscultated   Pulm: clear to auscultation bilaterally, regular and unlabored   Abdomen: soft, nontender, nondistended; normal bowel sounds   Extremities: no rash or edema   Psychiatric: Normal mood and behavior; memory intact     Results Review:     I reviewed the patient's new clinical results.    Results from last 7 days   Lab Units 02/11/23  0000 02/09/23  0805 02/08/23  0406   WBC 10*3/mm3 42.30* 61.26* 57.95*   HEMOGLOBIN g/dL 9.5* 9.7* 9.2*   HEMATOCRIT % 28.5* 29.0* 27.4*   PLATELETS 10*3/mm3 138* 152 220     Results from last 7 days   Lab Units 02/09/23  0805 02/08/23  0406 02/07/23 2022 02/07/23  0339 02/06/23  0323 02/05/23  0356   SODIUM mmol/L 142 136  --  139   < > 136   POTASSIUM mmol/L 3.7 3.9 4.4 3.0*   < > 3.6   CHLORIDE mmol/L 101 101  --  101   < > 104   CO2 mmol/L 29.2* 26.0  --  26.2   < > 24.1   BUN mg/dL 17 11  --  6*   < > 11   CREATININE mg/dL 0.74 0.84  --  0.73   < > 0.71   CALCIUM mg/dL 8.7 8.2*  --  8.2*   < > 8.2*   BILIRUBIN mg/dL  --   --   --   --   --  0.9   ALK PHOS U/L  --   --   --   --   --  171*   ALT (SGPT) U/L  --   --   --   --   --  41*   AST (SGOT) U/L  --   --   --   --   --  23   GLUCOSE mg/dL 103* 100*  --  90   < > 75    < > = values in this interval not displayed.         Lab Results   Lab Value Date/Time    LIPASE 10 (L) 02/04/2023 1419    LIPASE 13 01/23/2023 0216    LIPASE 20 11/21/2022 0250    LIPASE 38 08/31/2022 2131    LIPASE 16.0 02/09/2022 0525    LIPASE 50 06/29/2021 1131       Radiology:  XR Abdomen KUB   Final Result       As  described.       This report was finalized on 2/11/2023 1:56 PM by Dr. Reilly Ritter M.D.          CT Abdomen Without Contrast   Final Result           1. Colonic diverticulosis. No acute inflammatory process of bowel is   identified. Follow-up as indications persist.   2. No nephrolithiasis or hydronephrosis.       This report was finalized on 2/9/2023 8:03 PM by Dr. Reilly Ritter M.D.          XR Chest 1 View   Final Result      CT Abdomen Pelvis With Contrast   Final Result   1. No evidence for acute abnormality of the abdomen or pelvis.   2. Small hiatal hernia.   3. 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 Suspect small left   renal cyst.   4. Previous cholecystectomy.   5. Sigmoid diverticulosis without evidence for diverticulitis.       Radiation dose reduction techniques were utilized, including automated   exposure control and exposure modulation based on body size.       This report was finalized on 2/4/2023 4:13 PM by Dr. Torey Bedoya M.D.              Assessment & Plan     Patient Active Problem List   Diagnosis   • CAD (coronary artery disease)   • Nonbacterial thrombotic endocarditis   • Paroxysmal atrial fibrillation (HCC)   • Myeloproliferative disorder (HCC)   • Microcytic anemia   • Type 2 diabetes mellitus with circulatory disorder, without long-term current use of insulin (HCC)   • GERD (gastroesophageal reflux disease)   • Hypertension   • Personal history of transient ischemic attack (TIA), 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 deficits   • COVID   • Porcelain gallbladder   • Breast cancer (HCC)   • Chronic diastolic (congestive) heart failure (HCC)   • Cholecystitis   • Urinary tract infection without hematuria, site unspecified   • Epigastric abdominal pain   • Gastritis   • Atherosclerosis of abdominal aorta (HCC)       Assessment:  1. Melena: H&H stable  2. Acute on chronic anemia  3. Generalized abdominal pain  4. A-fib on Eliquis  5. CAD on  Plavix  6. Myeloproliferative disorder      Plan:  · Consider capsule enteroscopy in 2 weeks time  · Follow-up with nurse practitioner as scheduled  · Continue PPI, Carafate, and simethicone  I discussed the patients findings and my recommendations with patient.    Venancio Child MD

## 2023-02-12 ENCOUNTER — READMISSION MANAGEMENT (OUTPATIENT)
Dept: CALL CENTER | Facility: HOSPITAL | Age: 83
End: 2023-02-12
Payer: MEDICARE

## 2023-02-12 VITALS
RESPIRATION RATE: 16 BRPM | HEART RATE: 92 BPM | OXYGEN SATURATION: 94 % | SYSTOLIC BLOOD PRESSURE: 118 MMHG | DIASTOLIC BLOOD PRESSURE: 61 MMHG | WEIGHT: 147 LBS | HEIGHT: 61 IN | BODY MASS INDEX: 27.75 KG/M2 | TEMPERATURE: 97.7 F

## 2023-02-12 LAB — GLUCOSE BLDC GLUCOMTR-MCNC: 104 MG/DL (ref 70–130)

## 2023-02-12 PROCEDURE — 63710000001 RUXOLITINIB 15 MG TABLET: Performed by: INTERNAL MEDICINE

## 2023-02-12 PROCEDURE — 82962 GLUCOSE BLOOD TEST: CPT

## 2023-02-12 PROCEDURE — C9399 UNCLASSIFIED DRUGS OR BIOLOG: HCPCS | Performed by: INTERNAL MEDICINE

## 2023-02-12 RX ADMIN — SERTRALINE 50 MG: 50 TABLET, FILM COATED ORAL at 08:01

## 2023-02-12 RX ADMIN — ASPIRIN 81 MG: 81 TABLET, COATED ORAL at 08:01

## 2023-02-12 RX ADMIN — SUCRALFATE 1 G: 1 TABLET ORAL at 07:01

## 2023-02-12 RX ADMIN — METOPROLOL SUCCINATE 25 MG: 25 TABLET, EXTENDED RELEASE ORAL at 08:01

## 2023-02-12 RX ADMIN — CLOPIDOGREL 75 MG: 75 TABLET, FILM COATED ORAL at 08:01

## 2023-02-12 RX ADMIN — PANTOPRAZOLE SODIUM 40 MG: 40 TABLET, DELAYED RELEASE ORAL at 07:01

## 2023-02-12 RX ADMIN — SIMETHICONE 80 MG: 80 TABLET, CHEWABLE ORAL at 08:01

## 2023-02-12 RX ADMIN — RUXOLITINIB 15 MG: 15 TABLET ORAL at 08:02

## 2023-02-12 RX ADMIN — EMPAGLIFLOZIN 10 MG: 10 TABLET, FILM COATED ORAL at 08:01

## 2023-02-12 RX ADMIN — NYSTATIN: 100000 POWDER TOPICAL at 08:01

## 2023-02-12 NOTE — PLAN OF CARE
Goal Outcome Evaluation: patient is being discharged from the hospital today. PIV to be removed and discharge instructions previewed with the patient and understanding was verbalized.  All questions and concerns were addressed.  Daughter to .

## 2023-02-12 NOTE — PAYOR COMM NOTE
"Maribell Boyer (83 y.o. Female)     ATTN: DISCHARGE SUMMARY FOR REVIEW: REF# [id number]    UR DEPT: [phone]  [phone]    [facility]: NPI [id number]      Date of Birth   1940    Social Security Number       Address   [address] [place] [address] [address] [place] [address]    Home Phone   [phone]    MRN   [id number]       Faith   Unknown    Marital Status                               Admission Date   2/4/23    Admission Type   Emergency    Admitting Provider   Jaimee Macias MD    Attending Provider       Department, Room/Bed   [facility] 4 [facility], S419/1       Discharge Date   2/12/2023    Discharge Disposition   Home or Self Care    Discharge Destination                               Attending Provider: (none)   Allergies: Aspirin, Oxycodone, Hydroxyurea, Diphenhydramine Hcl    Isolation: None   Infection: None   Code Status: Prior    Ht: 154.9 cm (61\")   Wt: 66.7 kg (147 lb)    Admission Cmt: None   Principal Problem: ABLA (acute blood loss anemia) [D62]                 Active Insurance as of 2/4/2023     Primary Coverage     Payor Plan Insurance Group Employer/Plan Group    AETNA MEDICARE REPLACEMENT AETNA MEDICARE REPLACEMENT [id number]     Payor Plan Address Payor Plan Phone Number Payor Plan Fax Number Effective Dates    [address] [phone]  1/1/2022 - None Entered    [facility] [id number]       Subscriber Name Subscriber Birth Date Member ID       MARIBELL BOYER 1940 [id number]                 Emergency Contacts      (Rel.) Home Phone Work Phone Mobile Phone    BOYERRAY (Daughter) [phone] -- [phone]    Karen Gutierrez (Daughter) -- -- [phone]    MerlinGreg (Son) -- -- [phone]    Meg Boyer (Brother) -- -- [phone]    MEG BANKS (Brother) -- -- [phone]               Operative/Procedure Notes (all)      Procedures signed by Guero Ferris MD at " 23 1256   Version 1 of 1     Procedure Orders    1. COLONOSCOPY [[id number]] ordered by Guero Ferris MD at 23 1220           [Media Unavailable] Scan on 2023 1220 by Guero Ferris MD: COLON          Electronically signed by Guero Ferris MD at 23 1256     Procedures signed by Guero Ferris MD at 23 1245   Version 1 of 1     Procedure Orders    1. SMALL BOWEL ENTEROSCOPY [[id number]] ordered by Guero Ferris MD at 23 1227           [Media Unavailable] Scan on 2023 1227 by Guero Ferris MD: SMALLBOWEL          Electronically signed by Guero Ferris MD at 23 1245          Discharge Summary      Anthony Muñoz MD at 23 0755              Patient Name: Catherine Boyer  : 1940  MRN: [id number]    Date of Admission: 2023  Date of Discharge:  2023  Primary Care Physician: Kristi Moreau APRN      Chief Complaint:   Abdominal Pain      Discharge Diagnoses     Active Hospital Problems    Diagnosis  POA   • Atherosclerosis of abdominal aorta (HCC) [I70.0]  Yes   • Chronic diastolic (congestive) heart failure (HCC) [I50.32]  Yes   • Personal history of transient ischemic attack (TIA), 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 deficits [Z86.73]  Not Applicable   • CAD (coronary artery disease) [I25.10]  Yes   • Myeloproliferative disorder (HCC) [D47.1]  Yes   • Paroxysmal atrial fibrillation (HCC) [I48.0]  Yes   • Type 2 diabetes mellitus with circulatory disorder, without long-term current use of insulin (HCC) [E11.59]  Yes      Resolved Hospital Problems    Diagnosis Date Resolved POA   • **ABLA (acute blood loss anemia) [D62] 2023 Yes   • Hypokalemia [E87.6] 2023 Unknown        Brief Admitting HPI     82 year old female who presented to the emergency room with abdominal pain and black stool which have been present for several days; she was recently admitted and had an egd done; she has had nausea but presently denies pain  or nausea; no fever or chills     Hospital Course     Pt admitted for abdominal pain and melena.  She was seen in consultation with GI, Cardiology, and Oncology.  She underwent push enteroscopy and C-scope on 2/9/2023, after Plavix was held for 5 days, with findings of 3 cm hiatal hernia, internal hemorrhoids, diverticulosis.  GI recommended video capsule endoscopy in 2 weeks post discharge to evaluate for any further bleeding.  Of note she did not have any further bleeding during hospitalization and her hemoglobin remained stable.  It was thought dark stool etiology was possibly due to biopsies taken during prior EGD in January, or from small bowel as above which can be further evaluated with VCE.  Cardiology recommended stopping apixaban, even though her [id number] score is high given her recurrent bleeding.  She will continue on aspirin and Plavix until March 2 , at which point she will continue with Plavix alone.  It will be considered in the outpatient setting to resume anticoagulation at a later date.  Furthermore she is followed by oncology throughout hospitalization given her history of JAK2 positive MPD.  Jakafi was initially held given possible association with hemorrhage, however her white count climbed during hospitalization and this was resumed with improvement in her counts.  She will follow-up with oncology in 4 to 6 weeks.  Patient had additional episode of abdominal pain prior to discharge and post endoscopy, for which CT scan of the abdomen was obtained, which did not show any etiology for this.  GI attributed this possibly to excess gas from endoscopy.  She had 1 additional episode of abdominal pain post breakfast day prior to discharge, which resolved.  Given patient's longstanding diabetic history, it might be wise to consider a gastric emptying study has an outpatient which can be further discussed with GI in 1 week.  At this point she is stable for discharge.  All this was discussed with  patient and she had no additional questions at this time.    Discharge Plan     Acute blood loss anemia, resolved  Melena, resolved  -Hb 6.1 OA > 8.6 > 9 > 9.7  -push EGD (2/9/23): 3 cm hiatal hernia  -c-scope (2/9/23) :Nonbleeding internal hemorrhoids; diverticulosis  -Planning for VCE in about 2 weeks as OP  -Continue PPI and finish nystatin for esophageal candidiasis on previous EGD  -Per cardiology at this time will discontinue Eliquis given recurrent bleeds  -Plan to resume ASA, Plavix through 3/2/2023, then Plavix alone     Chronic diastolic heart failure  -TTE (9/1/22): EF 57%   -Cardiology following  -Continue Lasix 40 mg daily     DM2  -Last A1c 6.7 (11/28/22)  -cont Jardiance     Myeloproliferative disorder  Anemia, macrocytic  Leukocytosis  -On Jakafi 15 mg twice daily, resume     CAD  -s/p 2 LEFTY 2/2022; LEFTY to diagonal a. 9/2/22 for ISR  -continue atorvastatin 80mg  -Plan to resume ASA, Plavix through 3/2/2023, then Plavix alone     Paroxysmal Afib  -RC: Metoprolol 25 mg  -AC: Apixaban-  plan to DC at discharge given bleeding risk.  Cardiology to reassess in outpatient setting     HTN  -metoprolol 25mg BID    Day of Discharge     Subjective:  No further abdominal pain.  Sitting up eating breakfast.  Ready for discharge.    Physical Exam:  Temp:  [97.7 °F (36.5 °C)-99.7 °F (37.6 °C)] 97.7 °F (36.5 °C)  Heart Rate:  [] 92  Resp:  [16-18] 16  BP: (118-126)/(61-67) 118/61  Body mass index is 27.78 kg/m².  Physical Exam  Constitutional:       General: She is not in acute distress.     Appearance: Normal appearance.   Cardiovascular:      Rate and Rhythm: Normal rate and regular rhythm.      Pulses: Normal pulses.      Heart sounds: No murmur heard.    No gallop.   Pulmonary:      Effort: Pulmonary effort is normal. No respiratory distress.      Breath sounds: Normal breath sounds.   Abdominal:      General: Abdomen is flat. Bowel sounds are normal. There is no distension.      Palpations: Abdomen is  soft.      Tenderness: There is no abdominal tenderness. There is no guarding.   Musculoskeletal:      Right lower leg: No edema.      Left lower leg: No edema.   Skin:     General: Skin is warm.   Neurological:      General: No focal deficit present.      Mental Status: She is alert and oriented to person, place, and time.   Psychiatric:         Mood and Affect: Mood normal.     Consultants     Consult Orders (all) (From admission, onward)     Start     Ordered    02/04/23 2041  Inpatient Cardiology Consult  Once        Specialty:  Cardiology  Provider:  Melanie Sutton MD    02/04/23 2041 02/04/23 2041  Inpatient Hematology & Oncology Consult  Once        Specialty:  Hematology and Oncology  Provider:  Darrell Reinoso MD    02/04/23 2041 02/04/23 1916  Inpatient Gastroenterology Consult  Once        Specialty:  Gastroenterology  Provider:  Charles Diaz MD    02/04/23 1916 02/04/23 1623  Gastroenterology (on-call MD unless specified)  Once        Specialty:  Gastroenterology  Provider:  Charles Diaz MD    02/04/23 1623    02/04/23 1614  [facility] (on-call MD unless specified) Details  Once        Specialty:  Hospitalist  Provider:  Jaimee Macias MD    02/04/23 1613              Procedures     COLONOSCOPY TO CECUM & T.I., ENTEROSCOPY SMALL BOWEL      Imaging Results (All)     Procedure Component Value Units Date/Time    XR Abdomen KUB [[id number]] Collected: 02/11/23 1355     Updated: 02/11/23 1359    Narrative:      XR ABDOMEN KUB-     INDICATIONS: Pain     TECHNIQUE: Supine views of the abdomen     COMPARISON:  image from CT from 02/09/2023     FINDINGS:      The bowel gas pattern is nonobstructive. No supine evidence for free  intraperitoneal gas. No definite nephrolithiasis. Aortic and other  arterial calcifications are present. Follow-up as indications persist.             Impression:         As described.     This report was finalized on 2/11/2023 1:56 PM by Dr. Reilly Ritter M.D.        CT Abdomen Without Contrast [[id number]] Collected: 02/09/23 1958     Updated: 02/09/23 2006    Narrative:      CT ABDOMEN WO CONTRAST-     INDICATIONS: Nausea, vomiting     TECHNIQUE: Unenhanced ABDOMEN CT Radiation dose reduction techniques  were utilized, including automated exposure control and exposure  modulation based on body size.     COMPARISON: 02/04/2023     FINDINGS:     The gallbladder is surgically absent.     At the hepatic dome, liver low densities seen that is too small to  characterize.     Fatty infiltration of the pancreatic head is apparent.     Nonspecific adrenal thickening appears stable.     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 Arterial  calcification is seen at the left renal hilum.     The spleen is enlarged, 16.1 cm.     Otherwise unremarkable unenhanced appearance of the liver, spleen,  adrenal glands, pancreas, kidneys.     Bowel is only partly included. No bowel obstruction or abnormal bowel  thickening is identified. Colonic diverticula are seen that do not  appear inflamed. Small hiatal hernia is apparent.     No free intraperitoneal gas or free fluid at the levels imaged.  Umbilical hernia of fat is noted.     Scattered small mesenteric and para-aortic lymph nodes are seen that are  not significant by size criteria.     Abdominal aorta is not aneurysmal. Aortic and other arterial  calcifications are present.     The lung bases show mild likely atelectasis and scarring. Moderate  coronary arterial calcification is apparent.     Degenerative changes are seen in the spine. No acute fracture is  identified.             Impression:            1. Colonic diverticulosis. No acute inflammatory process of bowel is  identified. Follow-up as indications persist.  2. No nephrolithiasis or hydronephrosis.     This report was finalized on 2/9/2023 8:03 PM by Dr. Reilly Ritter M.D.       XR Chest 1 View [[id number]] Collected: 02/08/23 1006     Updated: 02/08/23 1327     Narrative:      XR CHEST 1 VW-     HISTORY: 82-year-old female with dyspnea.     FINDINGS: There is pulmonary vascular congestion and likely CHF. No  focal airspace consolidations are seen. Very small pleural effusions are  possible.     This report was finalized on 2/8/2023 1:24 PM by Dr. Danisha Reaves M.D.       CT Abdomen Pelvis With Contrast [[id number]] Collected: 02/04/23 1607     Updated: 02/04/23 1617    Narrative:      CT ABDOMEN AND PELVIS WITH IV CONTRAST     HISTORY: Abdominal pain. Black stool.     TECHNIQUE:  CT includes axial imaging from the lung bases to the  trochanters with intravenous contrast and without use of oral contrast.  Data reconstructed in coronal and sagittal planes. Radiation dose  reduction techniques were utilized, including automated exposure control  and exposure modulation based on body size.     COMPARISON: MRI abdomen 01/25/2023, CT abdomen and pelvis with IV  contrast 01/23/2023, CT abdomen and pelvis with IV contrast 10/29/2022.     FINDINGS: Heart size is enlarged. Lung bases appear clear. Liver,  spleen, and adrenal glands appear within normal limits. 1.2 cm right  upper pole renal low-density lesion that contains fat density is  consistent with angiomyolipoma and this is without change. There is a  posterior left renal low-density lesion measuring 9 mm and this is most  likely a cyst. Pancreas appears within normal limits. There is no bowel  dilatation or evidence for bowel obstruction. Sigmoid diverticulosis is  present without evidence for diverticulitis. There is no evidence for  intra-abdominal abscess formation. There is advanced multilevel  degenerative disc disease within the lower thoracic spine and lumbar  spine associated with vacuum phenomena.       Impression:      1. No evidence for acute abnormality of the abdomen or pelvis.  2. Small hiatal hernia.  3. 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 Suspect small left  renal cyst.  4. Previous  cholecystectomy.  5. Sigmoid diverticulosis without evidence for diverticulitis.     Radiation dose reduction techniques were utilized, including automated  exposure control and exposure modulation based on body size.     This report was finalized on 2/4/2023 4:13 PM by Dr. Torey Bedoya M.D.           Results for orders placed during the hospital encounter of 02/04/23    Duplex Mesenteric Complete CAR    Interpretation Summary  •  No hemodynamically significant stenosis of the celiac artery is noted.  •  No hemodynamically significant stenosis of the superior mesenteric artery (SMA) is noted.  •  Inferior mesenteric artery is patent    Results for orders placed during the hospital encounter of 08/31/22    Echocardiogram 2D complete    Interpretation Summary  · Left ventricular ejection fraction appears to be 61 - 65%. Left ventricular systolic function is normal.[place] is hypokinetic.  · Left ventricular diastolic function is consistent with (grade II w/high LAP) pseudonormalization.  · There is echogenic mobile mass on the ventricular surface of a calcified and thickened non-coronary cusp of the aortic valve. An echo from 09/30/2021 reported similar finding.DDX old vegetation, mass of calcium extending from the noncoronary cusp of the aortic valve.  · The left atrial cavity is moderate to severely dilated.    Pertinent Labs     Results from last 7 days   Lab Units 02/11/23  0000 02/09/23  0805 02/08/23  0406 02/07/23  0339   WBC 10*3/mm3 42.30* 61.26* 57.95* 49.82*   HEMOGLOBIN g/dL 9.5* 9.7* 9.2* 9.0*   PLATELETS 10*3/mm3 138* 152 220 253     Results from last 7 days   Lab Units 02/09/23  0805 02/08/23  0406 02/07/23 2022 02/07/23  0339 02/06/23  0323   SODIUM mmol/L 142 136  --  139 136   POTASSIUM mmol/L 3.7 3.9 4.4 3.0* 2.8*   CHLORIDE mmol/L 101 101  --  101 99   CO2 mmol/L 29.2* 26.0  --  26.2 26.9   BUN mg/dL 17 11  --  6* 10   CREATININE mg/dL 0.74 0.84  --  0.73 0.66   GLUCOSE mg/dL 103* 100*  --  90  59*   EGFR mL/min/1.73 79.4 68.6  --  81.2 86.6       Results from last 7 days   Lab Units 02/09/23  0805 02/08/23  0406 02/07/23  0339 02/06/23  0323   CALCIUM mg/dL 8.7 8.2* 8.2* 7.8*       Results from last 7 days   Lab Units 02/08/23  1148 02/08/23  0914   HSTROP T ng/L 30* 30*   D DIMER QUANT MCGFEU/mL  --  0.82           Invalid input(s): LDLCALC          Test Results Pending at Discharge       Discharge Details        Discharge Medications      New Medications      Instructions Start Date   Aspirin Low Dose 81 MG EC tablet  Generic drug: aspirin   81 mg, Oral, Daily      Gas Relief 80 MG chewable tablet  Generic drug: simethicone   Chew 1 tablet by mouth 4 (Four) Times a Day As Needed for Flatulence.      sucralfate 1 g tablet  Commonly known as: CARAFATE   1 g, Oral, 4 Times Daily Before Meals & Nightly         Continue These Medications      Instructions Start Date   acetaminophen 500 MG tablet  Commonly known as: TYLENOL   500 mg, Oral, As Needed      atorvastatin 80 MG tablet  Commonly known as: LIPITOR   80 mg, Oral, Nightly      bimatoprost 0.01 % ophthalmic drops  Commonly known as: LUMIGAN   1 drop, Ophthalmic      clopidogrel 75 MG tablet  Commonly known as: PLAVIX   75 mg, Oral, Daily      empagliflozin 10 MG tablet tablet  Commonly known as: JARDIANCE   10 mg, Oral, Daily      furosemide 40 MG tablet  Commonly known as: LASIX   40 mg, Oral, Daily      Jakafi 15 MG chemo tablet  Generic drug: ruxolitinib   15 mg, Oral, 2 Times Daily      latanoprost 0.005 % ophthalmic solution  Commonly known as: XALATAN   1 drop, Both Eyes      metoprolol succinate XL 25 MG 24 hr tablet  Commonly known as: TOPROL-XL   25 mg, 2 Times Daily      nystatin 100,000 unit/mL suspension  Commonly known as: MYCOSTATIN   500,000 Units, Swish & Swallow, 4 Times Daily      ondansetron 4 MG tablet  Commonly known as: ZOFRAN   4 mg, Oral, Every 6 Hours PRN      pantoprazole 40 MG EC tablet  Commonly known as: PROTONIX   40 mg,  Oral, Daily      sertraline 50 MG tablet  Commonly known as: ZOLOFT   50 mg, Oral, Daily         Stop These Medications    apixaban 5 MG tablet tablet  Commonly known as: Eliquis            Allergies   Allergen Reactions   • Aspirin Unknown - Low Severity   • Oxycodone Unknown - Low Severity   • Hydroxyurea Other (See Comments)     Her hemoglobin drop and was stop in February 2022 and start taking Jakafi   • Diphenhydramine Hcl Anxiety and Unknown (See Comments)     Benadryl IVP       Discharge Disposition:  Home or Self Care      Discharge Diet:  Diet Order   Procedures   • Diet: Diabetic Diets; Consistent Carbohydrate; Texture: Regular Texture (IDDSI 7); Fluid Consistency: Thin (IDDSI 0)       Discharge Activity:   Activity Instructions     Activity as Tolerated      Activity as Tolerated            CODE STATUS:    Code Status and Medical Interventions:   Ordered at: 02/04/23 1916     Code Status (Patient has no pulse and is not breathing):    CPR (Attempt to Resuscitate)     Medical Interventions (Patient has pulse or is breathing):    Full       Future Appointments   Date Time Provider Department Center   2/15/2023  2:15 PM Faustina Boyer PA-C [facility]   2/24/2023  1:00 PM Estrellita Blanco APRN [facility]      Follow-up Information     Faustina Boyer PA-C Follow up on 2/15/2023.    Specialty: Gastroenterology  Contact information:  [address] [facility]  [address]  [place] [id number] [phone]             Kristi Moreau APRN Follow up in 2 week(s).    Specialty: Nurse Practitioner  Contact information:  [address] [facility] [address]  [address]  [facility] [id number] [phone]             Estrellita Blanco APRN Follow up on 2/16/2023.    Specialty: Cardiology  Contact information:  [address] [facility]  [address]  [facility] [id number] [phone]                         Time Spent on Discharge:  Greater than 30 minutes      Anthony Muñoz MD  [facility]  02/12/23  07:55  EST                Electronically signed by Anthony Muñoz MD at 02/12/23 0954       Discharge Order (From admission, onward)     Start     Ordered    02/12/23 0756  Discharge patient  Once        Expected Discharge Date: 02/12/23    Discharge Disposition: Home or Self Care    Physician of Record for Attribution - Please select from Treatment Team: ANTHONY MUÑOZ [[id number]]    Review needed by CMO to determine Physician of Record: No       Question Answer Comment   Physician of Record for Attribution - Please select from Treatment Team ANTHONY MUÑOZ    Review needed by CMO to determine Physician of Record No        02/12/23 0755    02/11/23 0714  Discharge patient  Once,   Status:  Canceled        Expected Discharge Date: 02/11/23    Discharge Disposition: Home or Self Care    Physician of Record for Attribution - Please select from Treatment Team: ANTHONY MUÑOZ [[id number]]    Review needed by CMO to determine Physician of Record: No       Question Answer Comment   Physician of Record for Attribution - Please select from Treatment Team ANTHONY MUÑOZ    Review needed by CMO to determine Physician of Record No        02/11/23 0716

## 2023-02-12 NOTE — DISCHARGE SUMMARY
Patient Name: Catherine Boyer  : 1940  MRN: [id number]    Date of Admission: 2023  Date of Discharge:  2023  Primary Care Physician: Kristi Moreau APRN      Chief Complaint:   Abdominal Pain      Discharge Diagnoses     Active Hospital Problems    Diagnosis  POA   • Atherosclerosis of abdominal aorta (HCC) [I70.0]  Yes   • Chronic diastolic (congestive) heart failure (HCC) [I50.32]  Yes   • Personal history of transient ischemic attack (TIA), 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 deficits [Z86.73]  Not Applicable   • CAD (coronary artery disease) [I25.10]  Yes   • Myeloproliferative disorder (HCC) [D47.1]  Yes   • Paroxysmal atrial fibrillation (HCC) [I48.0]  Yes   • Type 2 diabetes mellitus with circulatory disorder, without long-term current use of insulin (HCC) [E11.59]  Yes      Resolved Hospital Problems    Diagnosis Date Resolved POA   • **ABLA (acute blood loss anemia) [D62] 2023 Yes   • Hypokalemia [E87.6] 2023 Unknown        Brief Admitting HPI     82 year old female who presented to the emergency room with abdominal pain and black stool which have been present for several days; she was recently admitted and had an egd done; she has had nausea but presently denies pain or nausea; no fever or chills     Hospital Course     Pt admitted for abdominal pain and melena.  She was seen in consultation with GI, Cardiology, and Oncology.  She underwent push enteroscopy and C-scope on 2023, after Plavix was held for 5 days, with findings of 3 cm hiatal hernia, internal hemorrhoids, diverticulosis.  GI recommended video capsule endoscopy in 2 weeks post discharge to evaluate for any further bleeding.  Of note she did not have any further bleeding during hospitalization and her hemoglobin remained stable.  It was thought dark stool etiology was possibly due to biopsies taken during prior EGD in January, or from small bowel as above which can be further evaluated with VCE.   Cardiology recommended stopping apixaban, even though her [id number] score is high given her recurrent bleeding.  She will continue on aspirin and Plavix until March 2 , at which point she will continue with Plavix alone.  It will be considered in the outpatient setting to resume anticoagulation at a later date.  Furthermore she is followed by oncology throughout hospitalization given her history of JAK2 positive MPD.  Jakafi was initially held given possible association with hemorrhage, however her white count climbed during hospitalization and this was resumed with improvement in her counts.  She will follow-up with oncology in 4 to 6 weeks.  Patient had additional episode of abdominal pain prior to discharge and post endoscopy, for which CT scan of the abdomen was obtained, which did not show any etiology for this.  GI attributed this possibly to excess gas from endoscopy.  She had 1 additional episode of abdominal pain post breakfast day prior to discharge, which resolved.  Given patient's longstanding diabetic history, it might be wise to consider a gastric emptying study has an outpatient which can be further discussed with GI in 1 week.  At this point she is stable for discharge.  All this was discussed with patient and she had no additional questions at this time.    Discharge Plan     Acute blood loss anemia, resolved  Melena, resolved  -Hb 6.1 OA > 8.6 > 9 > 9.7  -push EGD (2/9/23): 3 cm hiatal hernia  -c-scope (2/9/23) :Nonbleeding internal hemorrhoids; diverticulosis  -Planning for VCE in about 2 weeks as OP  -Continue PPI and finish nystatin for esophageal candidiasis on previous EGD  -Per cardiology at this time will discontinue Eliquis given recurrent bleeds  -Plan to resume ASA, Plavix through 3/2/2023, then Plavix alone     Chronic diastolic heart failure  -TTE (9/1/22): EF 57%   -Cardiology following  -Continue Lasix 40 mg daily     DM2  -Last A1c 6.7 (11/28/22)  -cont  Jardiance     Myeloproliferative disorder  Anemia, macrocytic  Leukocytosis  -On Jakafi 15 mg twice daily, resume     CAD  -s/p 2 LEFTY 2/2022; LEFTY to diagonal a. 9/2/22 for ISR  -continue atorvastatin 80mg  -Plan to resume ASA, Plavix through 3/2/2023, then Plavix alone     Paroxysmal Afib  -RC: Metoprolol 25 mg  -AC: Apixaban-  plan to DC at discharge given bleeding risk.  Cardiology to reassess in outpatient setting     HTN  -metoprolol 25mg BID    Day of Discharge     Subjective:  No further abdominal pain.  Sitting up eating breakfast.  Ready for discharge.    Physical Exam:  Temp:  [97.7 °F (36.5 °C)-99.7 °F (37.6 °C)] 97.7 °F (36.5 °C)  Heart Rate:  [] 92  Resp:  [16-18] 16  BP: (118-126)/(61-67) 118/61  Body mass index is 27.78 kg/m².  Physical Exam  Constitutional:       General: She is not in acute distress.     Appearance: Normal appearance.   Cardiovascular:      Rate and Rhythm: Normal rate and regular rhythm.      Pulses: Normal pulses.      Heart sounds: No murmur heard.    No gallop.   Pulmonary:      Effort: Pulmonary effort is normal. No respiratory distress.      Breath sounds: Normal breath sounds.   Abdominal:      General: Abdomen is flat. Bowel sounds are normal. There is no distension.      Palpations: Abdomen is soft.      Tenderness: There is no abdominal tenderness. There is no guarding.   Musculoskeletal:      Right lower leg: No edema.      Left lower leg: No edema.   Skin:     General: Skin is warm.   Neurological:      General: No focal deficit present.      Mental Status: She is alert and oriented to person, place, and time.   Psychiatric:         Mood and Affect: Mood normal.     Consultants     Consult Orders (all) (From admission, onward)     Start     Ordered    02/04/23 2041  Inpatient Cardiology Consult  Once        Specialty:  Cardiology  Provider:  Melanie Sutton MD    02/04/23 2041 02/04/23 2041  Inpatient Hematology & Oncology Consult  Once        Specialty:   Hematology and Oncology  Provider:  Darrell Reinoso MD    02/04/23 2041 02/04/23 1916  Inpatient Gastroenterology Consult  Once        Specialty:  Gastroenterology  Provider:  Charles Diaz MD    02/04/23 1916 02/04/23 1623  Gastroenterology (on-call MD unless specified)  Once        Specialty:  Gastroenterology  Provider:  Charles Diaz MD    02/04/23 1623    02/04/23 1614  [facility] (on-call MD unless specified) Details  Once        Specialty:  Hospitalist  Provider:  Jaimee Macias MD    02/04/23 1613              Procedures     COLONOSCOPY TO CECUM & T.I., ENTEROSCOPY SMALL BOWEL      Imaging Results (All)     Procedure Component Value Units Date/Time    XR Abdomen KUB [[id number]] Collected: 02/11/23 1355     Updated: 02/11/23 1359    Narrative:      XR ABDOMEN KUB-     INDICATIONS: Pain     TECHNIQUE: Supine views of the abdomen     COMPARISON:  image from CT from 02/09/2023     FINDINGS:      The bowel gas pattern is nonobstructive. No supine evidence for free  intraperitoneal gas. No definite nephrolithiasis. Aortic and other  arterial calcifications are present. Follow-up as indications persist.             Impression:         As described.     This report was finalized on 2/11/2023 1:56 PM by Dr. Reilly Ritter M.D.       CT Abdomen Without Contrast [[id number]] Collected: 02/09/23 1958     Updated: 02/09/23 2006    Narrative:      CT ABDOMEN WO CONTRAST-     INDICATIONS: Nausea, vomiting     TECHNIQUE: Unenhanced ABDOMEN CT Radiation dose reduction techniques  were utilized, including automated exposure control and exposure  modulation based on body size.     COMPARISON: 02/04/2023     FINDINGS:     The gallbladder is surgically absent.     At the hepatic dome, liver low densities seen that is too small to  characterize.     Fatty infiltration of the pancreatic head is apparent.     Nonspecific adrenal thickening appears stable.     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  Arterial  calcification is seen at the left renal hilum.     The spleen is enlarged, 16.1 cm.     Otherwise unremarkable unenhanced appearance of the liver, spleen,  adrenal glands, pancreas, kidneys.     Bowel is only partly included. No bowel obstruction or abnormal bowel  thickening is identified. Colonic diverticula are seen that do not  appear inflamed. Small hiatal hernia is apparent.     No free intraperitoneal gas or free fluid at the levels imaged.  Umbilical hernia of fat is noted.     Scattered small mesenteric and para-aortic lymph nodes are seen that are  not significant by size criteria.     Abdominal aorta is not aneurysmal. Aortic and other arterial  calcifications are present.     The lung bases show mild likely atelectasis and scarring. Moderate  coronary arterial calcification is apparent.     Degenerative changes are seen in the spine. No acute fracture is  identified.             Impression:            1. Colonic diverticulosis. No acute inflammatory process of bowel is  identified. Follow-up as indications persist.  2. No nephrolithiasis or hydronephrosis.     This report was finalized on 2/9/2023 8:03 PM by Dr. Reilly Ritter M.D.       XR Chest 1 View [[id number]] Collected: 02/08/23 1006     Updated: 02/08/23 1327    Narrative:      XR CHEST 1 VW-     HISTORY: 82-year-old female with dyspnea.     FINDINGS: There is pulmonary vascular congestion and likely CHF. No  focal airspace consolidations are seen. Very small pleural effusions are  possible.     This report was finalized on 2/8/2023 1:24 PM by Dr. Danisha Reaves M.D.       CT Abdomen Pelvis With Contrast [[id number]] Collected: 02/04/23 1607     Updated: 02/04/23 1617    Narrative:      CT ABDOMEN AND PELVIS WITH IV CONTRAST     HISTORY: Abdominal pain. Black stool.     TECHNIQUE:  CT includes axial imaging from the lung bases to the  trochanters with intravenous contrast and without use of oral contrast.  Data reconstructed in coronal  and sagittal planes. Radiation dose  reduction techniques were utilized, including automated exposure control  and exposure modulation based on body size.     COMPARISON: MRI abdomen 01/25/2023, CT abdomen and pelvis with IV  contrast 01/23/2023, CT abdomen and pelvis with IV contrast 10/29/2022.     FINDINGS: Heart size is enlarged. Lung bases appear clear. Liver,  spleen, and adrenal glands appear within normal limits. 1.2 cm right  upper pole renal low-density lesion that contains fat density is  consistent with angiomyolipoma and this is without change. There is a  posterior left renal low-density lesion measuring 9 mm and this is most  likely a cyst. Pancreas appears within normal limits. There is no bowel  dilatation or evidence for bowel obstruction. Sigmoid diverticulosis is  present without evidence for diverticulitis. There is no evidence for  intra-abdominal abscess formation. There is advanced multilevel  degenerative disc disease within the lower thoracic spine and lumbar  spine associated with vacuum phenomena.       Impression:      1. No evidence for acute abnormality of the abdomen or pelvis.  2. Small hiatal hernia.  3. 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 Suspect small left  renal cyst.  4. Previous cholecystectomy.  5. Sigmoid diverticulosis without evidence for diverticulitis.     Radiation dose reduction techniques were utilized, including automated  exposure control and exposure modulation based on body size.     This report was finalized on 2/4/2023 4:13 PM by Dr. Torey Bedoya M.D.           Results for orders placed during the hospital encounter of 02/04/23    Duplex Mesenteric Complete CAR    Interpretation Summary  •  No hemodynamically significant stenosis of the celiac artery is noted.  •  No hemodynamically significant stenosis of the superior mesenteric artery (SMA) is noted.  •  Inferior mesenteric artery is patent    Results for orders placed during the hospital  encounter of 08/31/22    Echocardiogram 2D complete    Interpretation Summary  · Left ventricular ejection fraction appears to be 61 - 65%. Left ventricular systolic function is normal.[place] is hypokinetic.  · Left ventricular diastolic function is consistent with (grade II w/high LAP) pseudonormalization.  · There is echogenic mobile mass on the ventricular surface of a calcified and thickened non-coronary cusp of the aortic valve. An echo from 09/30/2021 reported similar finding.DDX old vegetation, mass of calcium extending from the noncoronary cusp of the aortic valve.  · The left atrial cavity is moderate to severely dilated.    Pertinent Labs     Results from last 7 days   Lab Units 02/11/23  0000 02/09/23  0805 02/08/23  0406 02/07/23  0339   WBC 10*3/mm3 42.30* 61.26* 57.95* 49.82*   HEMOGLOBIN g/dL 9.5* 9.7* 9.2* 9.0*   PLATELETS 10*3/mm3 138* 152 220 253     Results from last 7 days   Lab Units 02/09/23  0805 02/08/23  0406 02/07/23 2022 02/07/23 0339 02/06/23  0323   SODIUM mmol/L 142 136  --  139 136   POTASSIUM mmol/L 3.7 3.9 4.4 3.0* 2.8*   CHLORIDE mmol/L 101 101  --  101 99   CO2 mmol/L 29.2* 26.0  --  26.2 26.9   BUN mg/dL 17 11  --  6* 10   CREATININE mg/dL 0.74 0.84  --  0.73 0.66   GLUCOSE mg/dL 103* 100*  --  90 59*   EGFR mL/min/1.73 79.4 68.6  --  81.2 86.6       Results from last 7 days   Lab Units 02/09/23  0805 02/08/23  0406 02/07/23  0339 02/06/23  0323   CALCIUM mg/dL 8.7 8.2* 8.2* 7.8*       Results from last 7 days   Lab Units 02/08/23  1148 02/08/23  0914   HSTROP T ng/L 30* 30*   D DIMER QUANT MCGFEU/mL  --  0.82           Invalid input(s): LDLCALC          Test Results Pending at Discharge       Discharge Details        Discharge Medications      New Medications      Instructions Start Date   Aspirin Low Dose 81 MG EC tablet  Generic drug: aspirin   81 mg, Oral, Daily      Gas Relief 80 MG chewable tablet  Generic drug: simethicone   Chew 1 tablet by mouth 4 (Four) Times a Day As  Needed for Flatulence.      sucralfate 1 g tablet  Commonly known as: CARAFATE   1 g, Oral, 4 Times Daily Before Meals & Nightly         Continue These Medications      Instructions Start Date   acetaminophen 500 MG tablet  Commonly known as: TYLENOL   500 mg, Oral, As Needed      atorvastatin 80 MG tablet  Commonly known as: LIPITOR   80 mg, Oral, Nightly      bimatoprost 0.01 % ophthalmic drops  Commonly known as: LUMIGAN   1 drop, Ophthalmic      clopidogrel 75 MG tablet  Commonly known as: PLAVIX   75 mg, Oral, Daily      empagliflozin 10 MG tablet tablet  Commonly known as: JARDIANCE   10 mg, Oral, Daily      furosemide 40 MG tablet  Commonly known as: LASIX   40 mg, Oral, Daily      Jakafi 15 MG chemo tablet  Generic drug: ruxolitinib   15 mg, Oral, 2 Times Daily      latanoprost 0.005 % ophthalmic solution  Commonly known as: XALATAN   1 drop, Both Eyes      metoprolol succinate XL 25 MG 24 hr tablet  Commonly known as: TOPROL-XL   25 mg, 2 Times Daily      nystatin 100,000 unit/mL suspension  Commonly known as: MYCOSTATIN   500,000 Units, Swish & Swallow, 4 Times Daily      ondansetron 4 MG tablet  Commonly known as: ZOFRAN   4 mg, Oral, Every 6 Hours PRN      pantoprazole 40 MG EC tablet  Commonly known as: PROTONIX   40 mg, Oral, Daily      sertraline 50 MG tablet  Commonly known as: ZOLOFT   50 mg, Oral, Daily         Stop These Medications    apixaban 5 MG tablet tablet  Commonly known as: Eliquis            Allergies   Allergen Reactions   • Aspirin Unknown - Low Severity   • Oxycodone Unknown - Low Severity   • Hydroxyurea Other (See Comments)     Her hemoglobin drop and was stop in February 2022 and start taking Jakafi   • Diphenhydramine Hcl Anxiety and Unknown (See Comments)     Benadryl IVP       Discharge Disposition:  Home or Self Care      Discharge Diet:  Diet Order   Procedures   • Diet: Diabetic Diets; Consistent Carbohydrate; Texture: Regular Texture (IDDSI 7); Fluid Consistency: Thin (IDDSI  0)       Discharge Activity:   Activity Instructions     Activity as Tolerated      Activity as Tolerated            CODE STATUS:    Code Status and Medical Interventions:   Ordered at: 02/04/23 1916     Code Status (Patient has no pulse and is not breathing):    CPR (Attempt to Resuscitate)     Medical Interventions (Patient has pulse or is breathing):    Full       Future Appointments   Date Time Provider Department Center   2/15/2023  2:15 PM Faustina Boyer PA-C [facility]   2/24/2023  1:00 PM Estrellita Blanco APRN [facility]      Follow-up Information     Faustina Boyer PA-C Follow up on 2/15/2023.    Specialty: Gastroenterology  Contact information:  [address]  [address]  [place] [id number] [phone]             Kristi Moreau APRN Follow up in 2 week(s).    Specialty: Nurse Practitioner  Contact information:  [address]  [address]  [facility] [id number] [phone]             Estrellita Blanco APRN Follow up on 2/16/2023.    Specialty: Cardiology  Contact information:  [address]  [address]  [place] [address]  [phone]                         Time Spent on Discharge:  Greater than 30 minutes      Anthony Mñuoz MD  [facility]  02/12/23  07:55 EST

## 2023-02-13 ENCOUNTER — TELEPHONE (OUTPATIENT)
Dept: ONCOLOGY | Facility: CLINIC | Age: 83
End: 2023-02-13
Payer: MEDICARE

## 2023-02-13 DIAGNOSIS — D53.9 MACROCYTIC ANEMIA: Primary | ICD-10-CM

## 2023-02-13 NOTE — OUTREACH NOTE
Prep Survey    Flowsheet Row Responses   [facility] facility patient discharged from? [facility]   Is LACE score < 7 ? No   Eligibility Readm Mgmt   Discharge diagnosis acute blood loss anemia   Does the patient have one of the following disease processes/diagnoses(primary or secondary)? Other   Does the patient have Home health ordered? No   Is there a DME ordered? No   Prep survey completed? Yes          April MEZA - Registered Nurse

## 2023-02-13 NOTE — CASE MANAGEMENT/SOCIAL WORK
Case Management Discharge Note      Final Note: [facility] [place] via family, no additional CCP needs. Katheryn RN/CCP         Selected Continued Care - Discharged on 2/12/2023 Admission date: 2/4/2023 - Discharge disposition: Home or Self Care    Destination    No services have been selected for the patient.              Durable Medical Equipment    No services have been selected for the patient.              Dialysis/Infusion    No services have been selected for the patient.              Home Medical Care    No services have been selected for the patient.              Therapy    No services have been selected for the patient.              Community Resources    No services have been selected for the patient.              Community & DME    No services have been selected for the patient.                Selected Continued Care - Episodes Includes continued care and service providers with selected services from the active episodes listed below    Oncology Episode start date: 5/18/2022   There are no active outsourced providers for this episode.             Selected Continued Care - Prior Encounters Includes continued care and service providers with selected services from prior encounters from 11/6/2022 to 2/12/2023    Discharged on 1/26/2023 Admission date: 1/23/2023 - [place]charge disposition[place] [facility] [place]     Service Provider Selected Services Address Phone Fax Patient Preferred    [facility] [address] [facility] [address] [phone]           Home Medical Care     Service Provider Selected Services Address Phone Fax Patient Preferred    [facility] [address], [address], [facility] [address] [phone] --                         Final Discharge Disposition Code: 01 - home or self-care

## 2023-02-14 RX ORDER — CLOPIDOGREL BISULFATE 75 MG/1
TABLET ORAL
Qty: 30 TABLET | Refills: 11 | Status: SHIPPED | OUTPATIENT
Start: 2023-02-14

## 2023-02-15 ENCOUNTER — READMISSION MANAGEMENT (OUTPATIENT)
Dept: CALL CENTER | Facility: HOSPITAL | Age: 83
End: 2023-02-15
Payer: MEDICARE

## 2023-02-15 ENCOUNTER — OFFICE VISIT (OUTPATIENT)
Dept: GASTROENTEROLOGY | Facility: CLINIC | Age: 83
End: 2023-02-15
Payer: MEDICARE

## 2023-02-15 VITALS
BODY MASS INDEX: 27.19 KG/M2 | HEART RATE: 103 BPM | DIASTOLIC BLOOD PRESSURE: 70 MMHG | HEIGHT: 61 IN | SYSTOLIC BLOOD PRESSURE: 122 MMHG | TEMPERATURE: 97.6 F | WEIGHT: 144 LBS

## 2023-02-15 DIAGNOSIS — D64.9 ANEMIA, UNSPECIFIED TYPE: Primary | ICD-10-CM

## 2023-02-15 DIAGNOSIS — K86.2 PANCREATIC CYST: ICD-10-CM

## 2023-02-15 DIAGNOSIS — R74.8 ELEVATED ALKALINE PHOSPHATASE LEVEL: ICD-10-CM

## 2023-02-15 DIAGNOSIS — K44.9 HIATAL HERNIA: ICD-10-CM

## 2023-02-15 DIAGNOSIS — K21.9 GASTROESOPHAGEAL REFLUX DISEASE WITHOUT ESOPHAGITIS: ICD-10-CM

## 2023-02-15 DIAGNOSIS — R10.13 EPIGASTRIC PAIN: ICD-10-CM

## 2023-02-15 PROCEDURE — 99214 OFFICE O/P EST MOD 30 MIN: CPT | Performed by: PHYSICIAN ASSISTANT

## 2023-02-15 RX ORDER — SUCRALFATE 1 G/1
1 TABLET ORAL
Qty: 60 TABLET | Refills: 0 | Status: SHIPPED | OUTPATIENT
Start: 2023-02-15 | End: 2023-03-02 | Stop reason: SDUPTHER

## 2023-02-15 RX ORDER — PANTOPRAZOLE SODIUM 40 MG/1
40 TABLET, DELAYED RELEASE ORAL DAILY
Qty: 90 TABLET | Refills: 1 | Status: SHIPPED | OUTPATIENT
Start: 2023-02-15

## 2023-02-15 NOTE — PROGRESS NOTES
"Chief Complaint  Abdominal Pain and Nausea    Subjective          History of Present Illness    Catherine Boyer is a  83 y.o. female presents for hospital follow-up on abdominal pain, melena, and anemia.  She will follow with Dr. Guzman.  She is new to me.    Discharge note:  \"Pt admitted for abdominal pain and melena.  She was seen in consultation with GI, Cardiology, and Oncology.  She underwent push enteroscopy and C-scope on 2/9/2023, after Plavix was held for 5 days, with findings of 3 cm hiatal hernia, internal hemorrhoids, diverticulosis.  GI recommended video capsule endoscopy in 2 weeks post discharge to evaluate for any further bleeding.  Of note she did not have any further bleeding during hospitalization and her hemoglobin remained stable.  It was thought dark stool etiology was possibly due to biopsies taken during prior EGD in January, or from small bowel as above which can be further evaluated with VCE.  Cardiology recommended stopping apixaban, even though her [id number]-EFHg score is high given her recurrent bleeding.  She will continue on aspirin and Plavix until March 2 , at which point she will continue with Plavix alone.  It will be considered in the outpatient setting to resume anticoagulation at a later date.  Furthermore she is followed by oncology throughout hospitalization given her history of JAK2 positive MPD.  Jakafi was initially held given possible association with hemorrhage, however her white count climbed during hospitalization and this was resumed with improvement in her counts.  She will follow-up with oncology in 4 to 6 weeks.  Patient had additional episode of abdominal pain prior to discharge and post endoscopy, for which CT scan of the abdomen was obtained, which did not show any etiology for this.  GI attributed this possibly to excess gas from endoscopy.  She had 1 additional episode of abdominal pain post breakfast day prior to discharge, which resolved.  Given patient's " "longstanding diabetic history, it might be wise to consider a gastric emptying study has an outpatient which can be further discussed with GI in 1 week.\"  _________________________________________________________________    She is taking sucralafate 4 times day and pantoprazole 40 mg daily.  She feels the sucralfate is really helping her. Some epigastric pain today but overall better.  No trigger for pain and seems to relieve on its own.  Nausea is resolved.  No black or blood in stool.  Admits to fatigue.  No FLORES. On Plavix and baby ASA. Off Eliquis due to bleeding. Had one blood transfusion 8-9 months ago in [facility]. Did not see blood in stool at that time and does not remember having a work-up for GI bleed.    2/11/23 discharge labs showed hemoglobin stable at 9.5, normocytic normochromic.  Platelets 138.    2/5/2023 mesenteric duplex was unremarkable.    2/9/2023 small bowel enteroscopy showed 3 cm hiatal hernia, normal esophagus, stomach, duodenum, and jejunum.  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  No bleeding.  Colonoscopy at the same time showed diverticulosis, nonbleeding grade 1 internal hemorrhoids, no evidence of bleeding.    1/25/2023 EGD showed 3 cm hiatal hernia, mild gastritis, mild esophagitis, no active bleeding.  Biopsy showed normal duodenum, focal mild chronic inflammation negative for H. pylori, numerous fungal organisms consistent with Candida.    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  She has chronic intermittent ALP elevated since July 2021 as well as GGT.  Dr. Diaz reviewed her CT scan while admitted 1/25/2023 and suspected central intrahepatic biliary dilation was related to postcholecystectomy ectasia.  Follow-up MRCP showed some periportal enhancement and liver with no evidence of cholangitis clinically.  No findings to explain pain at that time.  She did show a sub-5 mm pancreatic cystic lesion without definite communication to the main " "pancreatic duct/small pseudocyst versus IPMN.  Single 2-year follow-up imaging recommended.      Objective   Vital Signs:   /70   Pulse 103   Temp 97.6 °F (36.4 °C)   Ht 154.9 cm (61\")   Wt 65.3 kg (144 lb)   BMI 27.21 kg/m²       Physical Exam  Vitals reviewed.   Constitutional:       General: She is awake. She is not in acute distress.     Appearance: Normal appearance. She is well-developed and well-groomed.   HENT:      Head: Normocephalic.   Pulmonary:      Effort: Pulmonary effort is normal. No respiratory distress.   Abdominal:      General: Abdomen is flat. Bowel sounds are normal. There is no distension.      Palpations: Abdomen is soft.      Tenderness: There is no abdominal tenderness.      Comments: Examined in chair due to mobility   Skin:     Coloration: Skin is not pale.   Neurological:      Mental Status: She is alert and oriented to person, place, and time.      Gait: Gait abnormal.      Comments: Uses walker   Psychiatric:         Mood and Affect: Mood and affect normal.         Speech: Speech normal.         Behavior: Behavior is cooperative.         Judgment: Judgment normal.          Result Review :             Assessment and Plan    Diagnoses and all orders for this visit:    1. Anemia, unspecified type (Primary)  -     CBC & Differential  -     Iron Profile  -     Ferritin  -     Vitamin B12  -     Folate    2. Gastroesophageal reflux disease without esophagitis    3. Epigastric pain    4. Hiatal hernia    5. Elevated alkaline phosphatase level    6. Pancreatic cyst    Other orders  -     pantoprazole (PROTONIX) 40 MG EC tablet; Take 1 tablet by mouth Daily.  Dispense: 90 tablet; Refill: 1  -     sucralfate (CARAFATE) 1 g tablet; Take 1 tablet by mouth 4 (Four) Times a Day Before Meals & at Bedtime for 53 doses.  Dispense: 60 tablet; Refill: 0    Recommend she continue pantoprazole 40 mg daily and Carafate for now.  We will proceed with capsule endoscopy given anemia, previous " melena, and ongoing epigastric pain (although significantly improved).    We will check above labs to reassess anemia.    She does have chronically intermittently elevated alkaline phosphatase with GGT elevated at 199 during her first hospital stay.  She will need this rechecked at some point and possibly antimitochondrial antibody to rule out PBC.  We will focus on anemia and epigastric pain for the time being.  She also has a tiny pancreatic cyst with recommended follow-up imaging in 2 years.    Follow Up   Return for capsule endoscopy.    Dragon dictation used throughout this note.     Faustina Boyer PA-C

## 2023-02-15 NOTE — OUTREACH NOTE
"Medical Week 1 Survey    Flowsheet Row Responses   [facility] patient discharged from? [place]   Does the patient have one of the following disease processes/diagnoses(primary or secondary)? Other   Week 1 attempt successful? Yes   Call start time 1158   Call end time 1202   General alerts for this patient Resides in [facility]   Discharge diagnosis acute blood loss anemia   Person spoke with today (if not patient) and relationship Karen daughter   Meds reviewed with patient/caregiver? Yes   Is the patient having any side effects they believe may be caused by any medication additions or changes? No   Does the patient have all medications ordered at discharge? Yes   Is the patient taking all medications as directed (includes completed medication regime)? Yes   Does the patient have a primary care provider?  Yes   Comments regarding PCP Dtr uncertain if PCP has been notified to visit at [facility] post discharge   Has the patient kept scheduled appointments due by today? N/A   Comments GI appt today 2/15/23, Cardiology 2/24/23   What is the Home health agency?  Dtr aware that they can notify PCP if HH services needed if not resumed post discharge   Psychosocial issues? No   Did the patient receive a copy of their discharge instructions? Yes   Nursing interventions Reviewed instructions with patient  [with dtr]   What is the patient's perception of their health status since discharge? Improving  [dtr reports pt is doing \"very well.\" No s/s bleeding, anemia noted, denies edema. Will f/u with GI today--no questions for this RN today]   Is the patient/caregiver able to teach back signs and symptoms related to disease process for when to call PCP? Yes   Is the patient/caregiver able to teach back signs and symptoms related to disease process for when to call 911? Yes   Week 1 call completed? Yes          RAUL TREJO - Registered Nurse  "

## 2023-02-16 LAB
BASOPHILS # BLD AUTO: 1 X10E3/UL (ref 0–0.2)
BASOPHILS NFR BLD AUTO: 2 %
EOSINOPHIL # BLD AUTO: 1 X10E3/UL (ref 0–0.4)
EOSINOPHIL NFR BLD AUTO: 2 %
ERYTHROCYTE [DISTWIDTH] IN BLOOD BY AUTOMATED COUNT: 15 % (ref 11.7–15.4)
FERRITIN SERPL-MCNC: 261 NG/ML (ref 15–150)
FOLATE SERPL-MCNC: 14.6 NG/ML
HCT VFR BLD AUTO: 34.9 % (ref 34–46.6)
HGB BLD-MCNC: 11.2 G/DL (ref 11.1–15.9)
IMMATURE CELLS: ABNORMAL
IRON SATN MFR SERPL: 24 % (ref 15–55)
IRON SERPL-MCNC: 95 UG/DL (ref 27–139)
LYMPHOCYTES # BLD AUTO: 4.4 X10E3/UL (ref 0.7–3.1)
LYMPHOCYTES NFR BLD AUTO: 9 %
MCH RBC QN AUTO: 31.3 PG (ref 26.6–33)
MCHC RBC AUTO-ENTMCNC: 32.1 G/DL (ref 31.5–35.7)
MCV RBC AUTO: 98 FL (ref 79–97)
METAMYELOCYTES NFR BLD: 3 % (ref 0–0)
MONOCYTES # BLD AUTO: 3 X10E3/UL (ref 0.1–0.9)
MONOCYTES NFR BLD AUTO: 6 %
MORPHOLOGY BLD-IMP: ABNORMAL
MYELOCYTES NFR BLD: 5 % (ref 0–0)
NEUTROPHILS # BLD AUTO: 35.9 X10E3/UL (ref 1.4–7)
NEUTROPHILS NFR BLD AUTO: 73 %
PLATELET # BLD AUTO: 225 X10E3/UL (ref 150–450)
RBC # BLD AUTO: 3.58 X10E6/UL (ref 3.77–5.28)
TIBC SERPL-MCNC: 389 UG/DL (ref 250–450)
UIBC SERPL-MCNC: 294 UG/DL (ref 118–369)
VIT B12 SERPL-MCNC: 1025 PG/ML (ref 232–1245)
WBC # BLD AUTO: 49.2 X10E3/UL (ref 3.4–10.8)

## 2023-02-16 NOTE — PROGRESS NOTES
Please let patient know that her labs look great.  Her hemoglobin has normalized to 11.1 which is a lower limit of normal.  No evidence of iron, B12, or folate deficiency.  Elevated white blood cell count is a known thing and she follows with hematology for this.

## 2023-02-17 ENCOUNTER — TELEPHONE (OUTPATIENT)
Dept: GASTROENTEROLOGY | Facility: CLINIC | Age: 83
End: 2023-02-17
Payer: MEDICARE

## 2023-02-17 ENCOUNTER — APPOINTMENT (OUTPATIENT)
Dept: CT IMAGING | Facility: HOSPITAL | Age: 83
End: 2023-02-17
Payer: MEDICARE

## 2023-02-17 ENCOUNTER — HOSPITAL ENCOUNTER (EMERGENCY)
Facility: HOSPITAL | Age: 83
Discharge: NURSING, REHAB OR LONG-TERM CARE | End: 2023-02-17
Attending: EMERGENCY MEDICINE | Admitting: EMERGENCY MEDICINE
Payer: MEDICARE

## 2023-02-17 VITALS
SYSTOLIC BLOOD PRESSURE: 134 MMHG | HEART RATE: 78 BPM | RESPIRATION RATE: 22 BRPM | TEMPERATURE: 97.9 F | DIASTOLIC BLOOD PRESSURE: 71 MMHG | OXYGEN SATURATION: 96 %

## 2023-02-17 DIAGNOSIS — R10.10 PAIN OF UPPER ABDOMEN: Primary | ICD-10-CM

## 2023-02-17 LAB
ALBUMIN SERPL-MCNC: 3.7 G/DL (ref 3.5–5.2)
ALBUMIN/GLOB SERPL: 1.3 G/DL
ALP SERPL-CCNC: 404 U/L (ref 39–117)
ALT SERPL W P-5'-P-CCNC: 62 U/L (ref 1–33)
ANION GAP SERPL CALCULATED.3IONS-SCNC: 11 MMOL/L (ref 5–15)
AST SERPL-CCNC: 56 U/L (ref 1–32)
BACTERIA UR QL AUTO: ABNORMAL /HPF
BILIRUB SERPL-MCNC: 0.9 MG/DL (ref 0–1.2)
BILIRUB UR QL STRIP: NEGATIVE
BUN SERPL-MCNC: 12 MG/DL (ref 8–23)
BUN/CREAT SERPL: 19.7 (ref 7–25)
CALCIUM SPEC-SCNC: 9 MG/DL (ref 8.6–10.5)
CHLORIDE SERPL-SCNC: 98 MMOL/L (ref 98–107)
CLARITY UR: CLEAR
CO2 SERPL-SCNC: 28 MMOL/L (ref 22–29)
COLOR UR: YELLOW
CREAT SERPL-MCNC: 0.61 MG/DL (ref 0.57–1)
DEPRECATED RDW RBC AUTO: 52.1 FL (ref 37–54)
EGFRCR SERPLBLD CKD-EPI 2021: 87.7 ML/MIN/1.73
ERYTHROCYTE [DISTWIDTH] IN BLOOD BY AUTOMATED COUNT: 15.3 % (ref 12.3–15.4)
GLOBULIN UR ELPH-MCNC: 2.9 GM/DL
GLUCOSE SERPL-MCNC: 170 MG/DL (ref 65–99)
GLUCOSE UR STRIP-MCNC: ABNORMAL MG/DL
HCT VFR BLD AUTO: 29.4 % (ref 34–46.6)
HGB BLD-MCNC: 9.7 G/DL (ref 12–15.9)
HGB UR QL STRIP.AUTO: NEGATIVE
HOLD SPECIMEN: NORMAL
HYALINE CASTS UR QL AUTO: ABNORMAL /LPF
KETONES UR QL STRIP: ABNORMAL
LEUKOCYTE ESTERASE UR QL STRIP.AUTO: NEGATIVE
LIPASE SERPL-CCNC: 14 U/L (ref 13–60)
LYMPHOCYTES # BLD MANUAL: 6.47 10*3/MM3 (ref 0.7–3.1)
LYMPHOCYTES NFR BLD MANUAL: 1 % (ref 5–12)
MCH RBC QN AUTO: 31.6 PG (ref 26.6–33)
MCHC RBC AUTO-ENTMCNC: 33 G/DL (ref 31.5–35.7)
MCV RBC AUTO: 95.8 FL (ref 79–97)
METAMYELOCYTES NFR BLD MANUAL: 3 % (ref 0–0)
MONOCYTES # BLD: 0.43 10*3/MM3 (ref 0.1–0.9)
MYELOCYTES NFR BLD MANUAL: 7 % (ref 0–0)
NEUTROPHILS # BLD AUTO: 31.93 10*3/MM3 (ref 1.7–7)
NEUTROPHILS NFR BLD MANUAL: 74 % (ref 42.7–76)
NITRITE UR QL STRIP: NEGATIVE
PH UR STRIP.AUTO: >=9 [PH] (ref 5–8)
PLAT MORPH BLD: NORMAL
PLATELET # BLD AUTO: 310 10*3/MM3 (ref 140–450)
PMV BLD AUTO: 11.9 FL (ref 6–12)
POTASSIUM SERPL-SCNC: 3.6 MMOL/L (ref 3.5–5.2)
PROT SERPL-MCNC: 6.6 G/DL (ref 6–8.5)
PROT UR QL STRIP: ABNORMAL
QT INTERVAL: 372 MS
RBC # BLD AUTO: 3.07 10*6/MM3 (ref 3.77–5.28)
RBC # UR STRIP: ABNORMAL /HPF
RBC MORPH BLD: NORMAL
REF LAB TEST METHOD: ABNORMAL
SODIUM SERPL-SCNC: 137 MMOL/L (ref 136–145)
SP GR UR STRIP: >=1.03 (ref 1–1.03)
SQUAMOUS #/AREA URNS HPF: ABNORMAL /HPF
TROPONIN T SERPL HS-MCNC: 25 NG/L
TROPONIN T SERPL HS-MCNC: 32 NG/L
UROBILINOGEN UR QL STRIP: ABNORMAL
VARIANT LYMPHS NFR BLD MANUAL: 15 % (ref 19.6–45.3)
WBC # UR STRIP: ABNORMAL /HPF
WBC MORPH BLD: NORMAL
WBC NRBC COR # BLD: 43.15 10*3/MM3 (ref 3.4–10.8)
WHOLE BLOOD HOLD COAG: NORMAL
WHOLE BLOOD HOLD SPECIMEN: NORMAL

## 2023-02-17 PROCEDURE — 74177 CT ABD & PELVIS W/CONTRAST: CPT

## 2023-02-17 PROCEDURE — 83690 ASSAY OF LIPASE: CPT | Performed by: EMERGENCY MEDICINE

## 2023-02-17 PROCEDURE — 96375 TX/PRO/DX INJ NEW DRUG ADDON: CPT

## 2023-02-17 PROCEDURE — 96374 THER/PROPH/DIAG INJ IV PUSH: CPT

## 2023-02-17 PROCEDURE — 85007 BL SMEAR W/DIFF WBC COUNT: CPT

## 2023-02-17 PROCEDURE — 81001 URINALYSIS AUTO W/SCOPE: CPT | Performed by: EMERGENCY MEDICINE

## 2023-02-17 PROCEDURE — 80053 COMPREHEN METABOLIC PANEL: CPT

## 2023-02-17 PROCEDURE — 93005 ELECTROCARDIOGRAM TRACING: CPT | Performed by: PHYSICIAN ASSISTANT

## 2023-02-17 PROCEDURE — 84484 ASSAY OF TROPONIN QUANT: CPT | Performed by: PHYSICIAN ASSISTANT

## 2023-02-17 PROCEDURE — 25010000002 MORPHINE PER 10 MG: Performed by: EMERGENCY MEDICINE

## 2023-02-17 PROCEDURE — 96376 TX/PRO/DX INJ SAME DRUG ADON: CPT

## 2023-02-17 PROCEDURE — 85025 COMPLETE CBC W/AUTO DIFF WBC: CPT

## 2023-02-17 PROCEDURE — 99284 EMERGENCY DEPT VISIT MOD MDM: CPT

## 2023-02-17 PROCEDURE — 25010000002 ONDANSETRON PER 1 MG: Performed by: EMERGENCY MEDICINE

## 2023-02-17 PROCEDURE — 93010 ELECTROCARDIOGRAM REPORT: CPT | Performed by: INTERNAL MEDICINE

## 2023-02-17 PROCEDURE — 25010000002 IOPAMIDOL 61 % SOLUTION: Performed by: EMERGENCY MEDICINE

## 2023-02-17 RX ORDER — MORPHINE SULFATE 2 MG/ML
2 INJECTION, SOLUTION INTRAMUSCULAR; INTRAVENOUS ONCE
Status: COMPLETED | OUTPATIENT
Start: 2023-02-17 | End: 2023-02-17

## 2023-02-17 RX ORDER — LIDOCAINE HYDROCHLORIDE 20 MG/ML
15 SOLUTION OROPHARYNGEAL ONCE
Status: COMPLETED | OUTPATIENT
Start: 2023-02-17 | End: 2023-02-17

## 2023-02-17 RX ORDER — SODIUM CHLORIDE 0.9 % (FLUSH) 0.9 %
10 SYRINGE (ML) INJECTION AS NEEDED
Status: DISCONTINUED | OUTPATIENT
Start: 2023-02-17 | End: 2023-02-17 | Stop reason: HOSPADM

## 2023-02-17 RX ORDER — FAMOTIDINE 10 MG/ML
20 INJECTION, SOLUTION INTRAVENOUS ONCE
Status: COMPLETED | OUTPATIENT
Start: 2023-02-17 | End: 2023-02-17

## 2023-02-17 RX ORDER — ONDANSETRON 2 MG/ML
4 INJECTION INTRAMUSCULAR; INTRAVENOUS ONCE
Status: COMPLETED | OUTPATIENT
Start: 2023-02-17 | End: 2023-02-17

## 2023-02-17 RX ORDER — MORPHINE SULFATE 2 MG/ML
4 INJECTION, SOLUTION INTRAMUSCULAR; INTRAVENOUS ONCE
Status: COMPLETED | OUTPATIENT
Start: 2023-02-17 | End: 2023-02-17

## 2023-02-17 RX ORDER — ALUMINA, MAGNESIA, AND SIMETHICONE 2400; 2400; 240 MG/30ML; MG/30ML; MG/30ML
15 SUSPENSION ORAL ONCE
Status: COMPLETED | OUTPATIENT
Start: 2023-02-17 | End: 2023-02-17

## 2023-02-17 RX ADMIN — IOPAMIDOL 85 ML: 612 INJECTION, SOLUTION INTRAVENOUS at 12:32

## 2023-02-17 RX ADMIN — ONDANSETRON 4 MG: 2 INJECTION INTRAMUSCULAR; INTRAVENOUS at 12:10

## 2023-02-17 RX ADMIN — LIDOCAINE HYDROCHLORIDE 15 ML: 20 SOLUTION ORAL; TOPICAL at 11:57

## 2023-02-17 RX ADMIN — MORPHINE SULFATE 2 MG: 2 INJECTION, SOLUTION INTRAMUSCULAR; INTRAVENOUS at 14:14

## 2023-02-17 RX ADMIN — ALUMINUM HYDROXIDE, MAGNESIUM HYDROXIDE, AND DIMETHICONE 15 ML: 400; 400; 40 SUSPENSION ORAL at 11:57

## 2023-02-17 RX ADMIN — FAMOTIDINE 20 MG: 10 INJECTION INTRAVENOUS at 12:00

## 2023-02-17 RX ADMIN — MORPHINE SULFATE 4 MG: 2 INJECTION, SOLUTION INTRAMUSCULAR; INTRAVENOUS at 11:58

## 2023-02-17 NOTE — DISCHARGE INSTRUCTIONS
Recommend gentle, bland diet with plenty of fluids as tolerated.  Please return to the emergency room for any worsening pain, fever, nausea, vomiting, bleeding, diarrhea or any other concerns.

## 2023-02-17 NOTE — TELEPHONE ENCOUNTER
----- Message from Faustina Boyer PA-C sent at 2/16/2023  5:00 PM EST -----  Please let patient know that her labs look great.  Her hemoglobin has normalized to 11.1 which is a lower limit of normal.  No evidence of iron, B12, or folate deficiency.  Elevated white blood cell count is a known thing and she follows with hematology for this.

## 2023-02-17 NOTE — TELEPHONE ENCOUNTER
Please inform that Rx sent.   Electronically signed by Greg Schoen, MD     Pt is currently in the ER

## 2023-02-17 NOTE — ED TRIAGE NOTES
Patient to er per ems from [facility] assisted living with c/o 10/10 ABD pain. Reported she had her gallbladder removed aprox a week ago. Patient reported sudden onset of ABD pain along with nausea this am. Patient alert x 4 to base line. Patient has mask on in triage along with staff.

## 2023-02-17 NOTE — ED PROVIDER NOTES
EMERGENCY DEPARTMENT ENCOUNTER    Room Number:  20/20  Date of encounter:  2/17/2023  PCP: Kristi Moreau APRN  Historian: Patient/family  Full history not obtainable due to: Patient is a poor historian    HPI:  Chief Complaint: Abdominal pain    Context: Catherine Boyer is a 83 y.o. female with a PMH significant for CAD, paroxysmal A-fib, type 2 diabetes, GERD, congestive heart failure, gastritis, myeloproliferative disorder who presents to the ED c/o epigastric abdominal pain which is aching and burning in nature with associated nausea and persistent vomiting through the morning since 3 AM.  No obvious provocative activity.  She took Carafate this morning without relief.  Denies development of fever, chills, change to bowel habits or urination.  Most recent abdominal surgery was in November with cholecystectomy which was uncomplicated.      MEDICAL RECORD REVIEW:    Upon review of the medical record it appears the patient's most recent evaluation was for anemia and GERD in the office with gastroenterology on 2/15/2023.  No changes to medications at that time.  The patient had a normal D-dimer on 2/8/2023 and a normal potassium on 2/7/2023.    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   • Paroxysmal atrial fibrillation (HCC) 07/14/2022   • Myeloproliferative disorder (HCC) 07/14/2022   • Microcytic anemia 07/14/2022   • Type 2 diabetes mellitus with circulatory disorder, without long-term current use of insulin (HCC) 07/14/2022   • GERD (gastroesophageal reflux disease)    • Hypertension    • Personal history of transient ischemic attack (TIA), 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 deficits 09/04/2022   • COVID 10/29/2022   • Porcelain gallbladder    • Breast cancer (HCC)    • Chronic diastolic (congestive) heart failure (HCC)    • Cholecystitis 11/22/2022   • Urinary tract  infection without hematuria, site unspecified 01/23/2023   • Epigastric abdominal pain 01/24/2023   • Gastritis 01/26/2023   • Atherosclerosis of abdominal aorta (HCC) 02/05/2023     Resolved Ambulatory Problems     Diagnosis Date Noted   • Acute on chronic diastolic heart failure (HCC) 07/14/2022   • Chest pain with high risk for cardiac etiology 08/31/2022   • Acute UTI (urinary tract infection) 11/21/2022   • ABLA (acute blood loss anemia) 02/04/2023   • Hypokalemia 02/07/2023     Past Medical History:   Diagnosis Date   • Atrial fibrillation (HCC)    • Carotid atherosclerosis    • Glaucoma    • History of cataract    • PUD (peptic ulcer disease)    • TIA (transient ischemic attack)    • Type 2 diabetes mellitus (HCC)    • Upper GI bleed          PAST SURGICAL HISTORY  Past Surgical History:   Procedure Laterality Date   • BREAST LUMPECTOMY  1999   • CARDIAC CATHETERIZATION N/A 09/02/2022    Procedure: Coronary angiography;  Surgeon: Guero Verde MD;  Location: [facility] CATH INVASIVE LOCATION;  Service: Cardiovascular;  Laterality: N/A;   • CARDIAC CATHETERIZATION N/A 09/02/2022    Procedure: Stent LEFTY coronary;  Surgeon: Guero Verde MD;  Location: [facility] CATH INVASIVE LOCATION;  Service: Cardiovascular;  Laterality: N/A;   • CATARACT EXTRACTION  2011   • CHOLECYSTECTOMY WITH INTRAOPERATIVE CHOLANGIOGRAM N/A 11/28/2022    Procedure: CHOLECYSTECTOMY LAPAROSCOPIC INTRAOPERATIVE CHOLANGIOGRAM;  Surgeon: Dani Grover Jr., MD;  Location: [facility] MAIN OR;  Service: General;  Laterality: N/A;   • COLONOSCOPY N/A 2/9/2023    Procedure: COLONOSCOPY TO CECUM & T.I.;  Surgeon: Guero Ferris MD;  Location: [facility] ENDOSCOPY;  Service: Gastroenterology;  Laterality: N/A;  PRE- MELENA, GI BLEED  POST- DIVERTICULOSIS, INT HEMORRHOIDS   • ENDOSCOPY N/A 1/25/2023    Procedure: ESOPHAGOGASTRODUODENOSCOPY WITH BIOPSIES;  Surgeon: Charles Diaz MD;  Location: [facility] ENDOSCOPY;  Service: Gastroenterology;  Laterality:  "N/A;  pre: abd pain, nausea  post: hiatal hernia, mild gastritis, sloughing of the esophagus   • ENTEROSCOPY SMALL BOWEL N/A 2/9/2023    Procedure: ENTEROSCOPY SMALL BOWEL;  Surgeon: Guero Ferris MD;  Location: [facility] ENDOSCOPY;  Service: Gastroenterology;  Laterality: N/A;  PRE- MELENA, GI BLEED  POST- HIATAL HERNIA   • JOINT REPLACEMENT           FAMILY HISTORY  Family History   Problem Relation Age of Onset   • Ovarian cancer Mother    • Lung cancer Father    • Lung cancer Sister    • Malig Hyperthermia Neg Hx          SOCIAL HISTORY  Social History     Socioeconomic History   • Marital status:    Tobacco Use   • Smoking status: Former     Packs/day: 1.00     Years: 20.00     Pack years: 20.00     Types: Cigarettes   • Smokeless tobacco: Never   • Tobacco comments:     30  years ago   Vaping Use   • Vaping Use: Never used   Substance and Sexual Activity   • Alcohol use: Yes     Comment: \"rare\"   • Drug use: Never   • Sexual activity: Defer         ALLERGIES  Aspirin, Oxycodone, Hydroxyurea, and Diphenhydramine hcl        REVIEW OF SYSTEMS    All systems reviewed and marked as negative except as listed in HPI     PHYSICAL EXAM    I have reviewed the triage vital signs and nursing notes.    ED Triage Vitals [02/17/23 1013]   Temp Heart Rate Resp BP SpO2   97.9 °F (36.6 °C) 70 22 130/62 98 %      Temp src Heart Rate Source Patient Position BP Location FiO2 (%)   Tympanic -- -- -- --       Physical Exam  Constitutional:       General: She is not in acute distress.     Appearance: She is well-developed.   HENT:      Head: Normocephalic and atraumatic.   Eyes:      General: No scleral icterus.     Conjunctiva/sclera: Conjunctivae normal.   Neck:      Trachea: No tracheal deviation.   Cardiovascular:      Rate and Rhythm: Normal rate and regular rhythm.   Pulmonary:      Effort: Pulmonary effort is normal.      Breath sounds: Normal breath sounds.   Abdominal:      Palpations: Abdomen is soft.      " Tenderness: There is abdominal tenderness in the epigastric area.   Musculoskeletal:         General: No deformity.      Cervical back: Normal range of motion.   Lymphadenopathy:      Cervical: No cervical adenopathy.   Skin:     General: Skin is warm and dry.   Neurological:      Mental Status: She is alert and oriented to person, place, and time.   Psychiatric:         Behavior: Behavior normal.         Cognition and Memory: Memory is impaired.         Vital signs and nursing notes reviewed.            LAB RESULTS  Recent Results (from the past 24 hour(s))   Comprehensive Metabolic Panel    Collection Time: 02/17/23 11:30 AM    Specimen: Blood   Result Value Ref Range    Glucose 170 (H) 65 - 99 mg/dL    BUN 12 8 - 23 mg/dL    Creatinine 0.61 0.57 - 1.00 mg/dL    Sodium 137 136 - 145 mmol/L    Potassium 3.6 3.5 - 5.2 mmol/L    Chloride 98 98 - 107 mmol/L    CO2 28.0 22.0 - 29.0 mmol/L    Calcium 9.0 8.6 - 10.5 mg/dL    Total Protein 6.6 6.0 - 8.5 g/dL    Albumin 3.7 3.5 - 5.2 g/dL    ALT (SGPT) 62 (H) 1 - 33 U/L    AST (SGOT) 56 (H) 1 - 32 U/L    Alkaline Phosphatase 404 (H) 39 - 117 U/L    Total Bilirubin 0.9 0.0 - 1.2 mg/dL    Globulin 2.9 gm/dL    A/G Ratio 1.3 g/dL    BUN/Creatinine Ratio 19.7 7.0 - 25.0    Anion Gap 11.0 5.0 - 15.0 mmol/L    eGFR 87.7 >60.0 mL/min/1.73   CBC Auto Differential    Collection Time: 02/17/23 11:30 AM    Specimen: Blood   Result Value Ref Range    WBC 43.15 (C) 3.40 - 10.80 10*3/mm3    RBC 3.07 (L) 3.77 - 5.28 10*6/mm3    Hemoglobin 9.7 (L) 12.0 - 15.9 g/dL    Hematocrit 29.4 (L) 34.0 - 46.6 %    MCV 95.8 79.0 - 97.0 fL    MCH 31.6 26.6 - 33.0 pg    MCHC 33.0 31.5 - 35.7 g/dL    RDW 15.3 12.3 - 15.4 %    RDW-SD 52.1 37.0 - 54.0 fl    MPV 11.9 6.0 - 12.0 fL    Platelets 310 140 - 450 10*3/mm3   Green Top (Gel)    Collection Time: 02/17/23 11:30 AM   Result Value Ref Range    Extra Tube Hold for add-ons.    Lavender Top    Collection Time: 02/17/23 11:30 AM   Result Value Ref Range     Extra Tube hold for add-on    Gold Top - SST    Collection Time: 02/17/23 11:30 AM   Result Value Ref Range    Extra Tube Hold for add-ons.    Light Blue Top    Collection Time: 02/17/23 11:30 AM   Result Value Ref Range    Extra Tube Hold for add-ons.    Lipase    Collection Time: 02/17/23 11:30 AM    Specimen: Blood   Result Value Ref Range    Lipase 14 13 - 60 U/L   Single High Sensitivity Troponin T    Collection Time: 02/17/23 11:30 AM    Specimen: Blood   Result Value Ref Range    HS Troponin T 32 (H) <10 ng/L   Manual Differential    Collection Time: 02/17/23 11:30 AM    Specimen: Blood   Result Value Ref Range    Neutrophil % 74.0 42.7 - 76.0 %    Lymphocyte % 15.0 (L) 19.6 - 45.3 %    Monocyte % 1.0 (L) 5.0 - 12.0 %    Metamyelocyte % 3.0 (H) 0.0 - 0.0 %    Myelocyte % 7.0 (H) 0.0 - 0.0 %    Neutrophils Absolute 31.93 (H) 1.70 - 7.00 10*3/mm3    Lymphocytes Absolute 6.47 (H) 0.70 - 3.10 10*3/mm3    Monocytes Absolute 0.43 0.10 - 0.90 10*3/mm3    RBC Morphology Normal Normal    WBC Morphology Normal Normal    Platelet Morphology Normal Normal   ECG 12 Lead Other; Upper abdominal pain    Collection Time: 02/17/23 12:07 PM   Result Value Ref Range    QT Interval 372 ms   Single High Sensitivity Troponin T    Collection Time: 02/17/23  1:33 PM    Specimen: Blood   Result Value Ref Range    HS Troponin T 25 (H) <10 ng/L       Ordered the above labs and independently reviewed the results.        RADIOLOGY  CT Abdomen Pelvis With Contrast    Result Date: 2/17/2023  CT ABDOMEN AND PELVIS WITH IV CONTRAST  HISTORY: 83-year-old female with diffuse abdominal pain and nausea. Cholecystectomy and hysterectomy in the past.  TECHNIQUE: Radiation dose reduction techniques were utilized, including automated exposure control and exposure modulation based on body size. 3 mm images were obtained through the abdomen and pelvis after the administration of IV contrast. Compared with previous CT 02/09/2023.  FINDINGS: The liver  appears unremarkable and there is no biliary dilatation. The spleen measures 14.5 cm in diameter on the coronal reconstruction series, previously 16 cm. There is no acute abnormality at the pancreas, adrenals, or kidneys. Tiny left renal cyst is noted. There is a tiny hiatal hernia. There is diverticulosis throughout the entire colon without evidence for diverticulitis. There is extensive pelvic floor relaxation and there is at least a small cystocele and there may be a small degree of vaginal prolapse. There is no free fluid or lymphadenopathy. There are extensive abdominal aortic atherosclerotic changes without aneurysmal dilatation. There is no evidence for pneumonia or effusions at the visualized lower lung fields. There is moderately advanced multilevel lumbar spondylosis.      1. There is diffuse colonic diverticulosis without evidence for diverticulitis. No acute bowel abnormality is seen. There is a tiny hiatal hernia. 2. Slight interval decrease in splenomegaly. 3. Significant pelvic floor relaxation.          I ordered the above noted radiological studies. Independently reviewed by me and discussed with radiologist.  See dictation above for official radiology interpretation.      PROCEDURES    Procedures  EKG           EKG time/Interp time: 1207/1210  Rhythm/Rate: Sinus rhythm, 96 bpm  P waves and HI: Present, 166 ms  QRS, axis: 89 ms, normal axis  ST and T waves: No ST segment elevation is apparent here.  No significant change in appearance in comparison to previous exam on file from February 8, 2023    Independently interpreted by me contemporaneously with treatment        MEDICATIONS GIVEN IN ER    Medications   sodium chloride 0.9 % flush 10 mL (has no administration in time range)   aluminum-magnesium hydroxide-simethicone (MAALOX MAX) 400-400-40 MG/5ML suspension 15 mL (15 mL Oral Given 2/17/23 1157)   Lidocaine Viscous HCl (XYLOCAINE) 2 % solution 15 mL (15 mL Mouth/Throat Given 2/17/23 1157)    famotidine (PEPCID) injection 20 mg (20 mg Intravenous Given 2/17/23 1200)   morphine injection 4 mg (4 mg Intravenous Given 2/17/23 1158)   ondansetron (ZOFRAN) injection 4 mg (4 mg Intravenous Given 2/17/23 1210)   iopamidol (ISOVUE-300) 61 % injection 100 mL (85 mL Intravenous Given by Other 2/17/23 1232)   morphine injection 2 mg (2 mg Intravenous Given 2/17/23 1414)         PROGRESS, DATA ANALYSIS, CONSULTS, AND MEDICAL DECISION MAKING    All labs have been independently interpreted by me.  All radiology studies have been interpreted by me.  Discussion below represents my analysis of pertinent findings related to patient's condition, differential diagnosis, treatment plan and final disposition.        Orders placed during this visit:  Orders Placed This Encounter   Procedures   • CT Abdomen Pelvis With Contrast   • Comprehensive Metabolic Panel   • [place] Draw   • CBC Auto Differential   • Lipase   • Urinalysis With Microscopic If Indicated (No Culture) - Urine, Clean Catch   • Single High Sensitivity Troponin T   • Manual Differential   • Single High Sensitivity Troponin T   • Urinalysis, Microscopic Only - Urine, Clean Catch   • ECG 12 Lead Other; Upper abdominal pain   • Insert Peripheral IV   • CBC & Differential   • Green Top (Gel)   • Lavender Top   • Gold Top - SST   • Gray Top   • Light Blue Top         ED Course as of 02/17/23 1419   Fri Feb 17, 2023   1204 WBC(!!): 43.15  At baseline [DC]   1215 Hemoglobin(!): 9.7 [DC]   1216 Alkaline Phosphatase(!): 404 [DC]   1216 AST (SGOT)(!): 56 [DC]   1216 ALT (SGPT)(!): 62 [DC]   1408 I just reassessed the patient and had a long discussion with her and her son at the bedside.  She says she is feeling much better now.  She was able to drink some fluids and is not having any nausea or vomiting at this time.  I have carefully reviewed all of her lab results and also the report from CT scan today.  She has a stable leukocytosis with her myeloproliferative  "disorder.  Her chemistries look to be stable to me.  She is afebrile and nontoxic-appearing.   [KUMAR]   1416 CT scan indicates that the liver appears normal and there is no biliary dilatation.  I do see that her LFTs have ever so slightly increased in comparison to previous values but I am not particularly worried about those right now.  Repeat abdominal exam does not demonstrate any significant tenderness in the right upper quadrant right now.  I had a long conversation with the patient and her son.  She expresses a desire to go back home if possible and I think that that is within reason given her overall appearance right now.  She has had a rather extensive GI work-up and testing done within the past week.  There is a plan for her to follow-up in GI clinic for pill camera testing within the next 2 weeks, and I think that is a satisfactory plan for now.  I did discuss at great length with both the patient and her son the usual \"return to ER\" instructions for any worsening signs or symptoms if they arise.  Then patient was discharged home in good condition. [KUMAR]      ED Course User Index  [DC] Jn Bess PA  [KUMAR] Stan Villanueva MD       AS OF 14:19 EST VITALS:    BP - 100/55  HR - 79  TEMP - 97.9 °F (36.6 °C) (Tympanic)  02 SATS - 96%        DIAGNOSIS  Final diagnoses:   Pain of upper abdomen         DISPOSITION  D/c    Pt masked in first look. I wore a surgical mask throughout my encounters with the pt. I performed hand hygiene on entry into the pt room and upon exit.     Dictated utilizing Dragon dictation     Note Disclaimer: At [facility], we believe that sharing information builds trust and better relationships. You are receiving this note because you recently visited [facility]. It is possible you will see health information before a provider has talked with you about it. This kind of information can be easy to misunderstand. To help you fully understand what it means for your health, we urge " you to discuss this note with your provider.      Stan Villanueva MD  02/17/23 9215

## 2023-02-18 NOTE — PROGRESS NOTES
"Enter Query Response Below      Query Response:     -hemorrhagic disorder due to extrinsic circulating anticoagulants         If applicable, please update the problem list.        Patient: Catherine Boyer        : 1940  Account: [id number]           Admit Date: 2023        How to Respond to this query:       a. Click New Note     b. Answer query within the yellow box.                c. Update the Problem List, if applicable.      If you have any questions about this query contact me at: [email or web address]    Dr. Muñoz,     Patient admitted with melena, ABLA.  Patient on anticoagulation of Plavix and Eliquis.  Per notes patient has had multiple episodes of bleeding in the past.  Protime 15, INR 1.16.  Eliquis and Plavix held, monitor for bleeding and scopes performed after medications out of system.  ED note states \" PROTIME-INR - Abnormal\".     Please clarify if the patient is treated/monitored for the following:  -hemorrhagic disorder due to extrinsic circulating anticoagulants  -coagulation defect  -other_____________  -unable to clinically determine     By submitting this query, we are merely seeking further clarification of documentation to accurately reflect all conditions that you are monitoring, evaluating, treating or that extend the hospitalization or utilize additional resources of care. Please utilize your independent clinical judgment when addressing the question(s) above.     This query and your response, once completed, will be entered into the legal medical record.    Sincerely,  Nata Chavis RN BSN  Clinical Documentation Integrity Program   "

## 2023-02-20 ENCOUNTER — TELEPHONE (OUTPATIENT)
Dept: GASTROENTEROLOGY | Facility: CLINIC | Age: 83
End: 2023-02-20
Payer: MEDICARE

## 2023-02-20 NOTE — TELEPHONE ENCOUNTER
----- Message from Faustina Boyer PA-C sent at 2/15/2023  3:25 PM EST -----  Please put in order for capsule endoscopy

## 2023-02-24 ENCOUNTER — OFFICE VISIT (OUTPATIENT)
Dept: CARDIOLOGY | Facility: CLINIC | Age: 83
End: 2023-02-24
Payer: MEDICARE

## 2023-02-24 VITALS
WEIGHT: 143.8 LBS | HEART RATE: 102 BPM | BODY MASS INDEX: 27.15 KG/M2 | SYSTOLIC BLOOD PRESSURE: 118 MMHG | OXYGEN SATURATION: 100 % | DIASTOLIC BLOOD PRESSURE: 60 MMHG | HEIGHT: 61 IN

## 2023-02-24 DIAGNOSIS — I50.32 CHRONIC DIASTOLIC (CONGESTIVE) HEART FAILURE: ICD-10-CM

## 2023-02-24 DIAGNOSIS — I38 NONBACTERIAL THROMBOTIC ENDOCARDITIS: ICD-10-CM

## 2023-02-24 DIAGNOSIS — I10 PRIMARY HYPERTENSION: ICD-10-CM

## 2023-02-24 DIAGNOSIS — I49.1 APC (ATRIAL PREMATURE CONTRACTIONS): ICD-10-CM

## 2023-02-24 DIAGNOSIS — I48.0 PAROXYSMAL ATRIAL FIBRILLATION: ICD-10-CM

## 2023-02-24 DIAGNOSIS — K92.1 MELENA: ICD-10-CM

## 2023-02-24 DIAGNOSIS — R00.0 TACHYCARDIA: ICD-10-CM

## 2023-02-24 DIAGNOSIS — I25.10 CORONARY ARTERY DISEASE INVOLVING NATIVE CORONARY ARTERY OF NATIVE HEART WITHOUT ANGINA PECTORIS: Primary | ICD-10-CM

## 2023-02-24 PROBLEM — U07.1 COVID: Status: RESOLVED | Noted: 2022-10-29 | Resolved: 2023-02-24

## 2023-02-24 PROCEDURE — 93000 ELECTROCARDIOGRAM COMPLETE: CPT | Performed by: NURSE PRACTITIONER

## 2023-02-24 PROCEDURE — 99214 OFFICE O/P EST MOD 30 MIN: CPT | Performed by: NURSE PRACTITIONER

## 2023-02-24 RX ORDER — ATORVASTATIN CALCIUM 80 MG/1
80 TABLET, FILM COATED ORAL NIGHTLY
Qty: 90 TABLET | Refills: 3 | Status: SHIPPED | OUTPATIENT
Start: 2023-02-24

## 2023-02-24 NOTE — PROGRESS NOTES
Date of Office Visit: 2023  Encounter Provider: ABEL Ferrell  Place of Service: [facility] CARDIOLOGY  Patient Name: Catherine Boyer  :1940  Primary Cardiologist: Dr. Albin Barriga     Chief Complaint   Patient presents with   • Follow-up   :     HPI: Catherine Boyer is a 83 y.o. female who presents today for hospital follow-up visit.  I have reviewed her medical records.    She has been diagnosed with type II diabetes, hypertension, and mild carotid disease.  She also has a history of right-sided breast cancer and underwent lumpectomy and radiation.  She did not require chemotherapy or hormonal therapy.     In 2021, while in [place] she was diagnosed with atrial fibrillation, nonbacterial endocarditis, and JAK2+ myeloproliferative disorder.    In 2022, she presented to the ED with heartburn and chest pain.  She was markedly anemic and hemoglobin was 5.5 requiring PRBC transfusion.  She was diagnosed with a non-STEMI.  Cardiac catheterization revealed EF 45-50%, stenosis of the diagonal and a long lesion in the mid to distal LAD.  She underwent drug-eluting stent placement to the diagonal and mid LAD was recommended medical treatment.    In 2022, she presented to the [facility] ED with chest pain and shortness of breath.  She was felt to have acute on chronic diastolic heart failure with hypoxia.  Stress testing was abnormal and she underwent drug-eluting stent placement to a large diagonal branch for in-stent restenosis.  She was noted to have  of mid RCA and nonobstructive disease of the LAD.  She underwent IV diuresis.    She has been hospitalized 6 times since 2022.  In 2023, she was hospitalized for epigastric pain radiating to her neck.  She underwent GI testing and was noted to have some mucosal changes in the esophagus, gastric, and HH.  Blood thinners were temporarily held and then restarted.    In 2023, she  "was hospitalized for severe abdominal pain and melena.  Enteroscopy and colonoscopy showed 3 cm hiatal hernia, internal hemorrhoids, and diverticulosis.  She was recommended to have an outpatient video capsule endoscopy which is scheduled for 3/1.  Cardiology recommended stopping her apixaban and continuing with aspirin and clopidogrel until March 2 and then she can take clopidogrel alone.  She was also recommended follow-up with hematology outpatient.  Troponin levels were mildly elevated and not felt to be ischemic.    She presents today for follow-up visit with her daughter accompanying her.  She is taking the aspirin and clopidogrel, but the apixaban is still on hold.  On 3/2, she can change to clopidogrel only.  She denies chest pain, shortness of air, palpitations, edema, dizziness, syncope, or melena.  She states that she is \"very tired\".  After today's office visit, she is going to [employer] and asked if she can have 1 beer.  I said yes.      Past Medical History:   Diagnosis Date   • Atherosclerosis of abdominal aorta (HCC) 2/5/2023   • Atrial fibrillation (HCC)    • Breast cancer (HCC)    • CAD (coronary artery disease)     NSTEMI 2/2022: 90% ostial LAD, 99% D1, 70% mid-distal LAD (medical therapy). She received two stents (2.5x18 and 2.5x26mm [place] LEFTY) but I don't know which one went to which lesion.   • Carotid atherosclerosis    • Chronic diastolic (congestive) heart failure (HCC)    • COVID 10/29/2022   • GERD (gastroesophageal reflux disease)    • Glaucoma    • History of cataract    • Hypertension    • Microcytic anemia     per Dr. oleg pate office  note 6/30/22-dd   • Myeloproliferative disorder (HCC)     JAK2 positive   • Nonbacterial thrombotic endocarditis     6/2021: 4x5mm vegetation on the ventricular surface of the anterior MV, negative blood cultures   • Porcelain gallbladder    • PUD (peptic ulcer disease)    • TIA (transient ischemic attack)    • Type 2 diabetes mellitus (HCC)    • Type " 2 diabetes mellitus with circulatory disorder, without long-term current use of insulin ([facility]) 7/14/2022   • Upper GI bleed        Past Surgical History:   Procedure Laterality Date   • BREAST LUMPECTOMY  1999   • CARDIAC CATHETERIZATION N/A 09/02/2022    Procedure: Coronary angiography;  Surgeon: Guero Verde MD;  Location: [facility] CATH INVASIVE LOCATION;  Service: Cardiovascular;  Laterality: N/A;   • CARDIAC CATHETERIZATION N/A 09/02/2022    Procedure: Stent LEFTY coronary;  Surgeon: Guero Verde MD;  Location: [facility] CATH INVASIVE LOCATION;  Service: Cardiovascular;  Laterality: N/A;   • CATARACT EXTRACTION  2011   • CHOLECYSTECTOMY WITH INTRAOPERATIVE CHOLANGIOGRAM N/A 11/28/2022    Procedure: CHOLECYSTECTOMY LAPAROSCOPIC INTRAOPERATIVE CHOLANGIOGRAM;  Surgeon: Dani Grover Jr., MD;  Location: [facility] MAIN OR;  Service: General;  Laterality: N/A;   • COLONOSCOPY N/A 2/9/2023    Procedure: COLONOSCOPY TO CECUM & T.I.;  Surgeon: Guero Ferris MD;  Location: [facility] ENDOSCOPY;  Service: Gastroenterology;  Laterality: N/A;  PRE- MELENA, GI BLEED  POST- DIVERTICULOSIS, INT HEMORRHOIDS   • ENDOSCOPY N/A 1/25/2023    Procedure: ESOPHAGOGASTRODUODENOSCOPY WITH BIOPSIES;  Surgeon: Charles Diaz MD;  Location: [facility] ENDOSCOPY;  Service: Gastroenterology;  Laterality: N/A;  pre: abd pain, nausea  post: hiatal hernia, mild gastritis, sloughing of the esophagus   • ENTEROSCOPY SMALL BOWEL N/A 2/9/2023    Procedure: ENTEROSCOPY SMALL BOWEL;  Surgeon: Guero Ferris MD;  Location: [facility] ENDOSCOPY;  Service: Gastroenterology;  Laterality: N/A;  PRE- MELENA, GI BLEED  POST- HIATAL HERNIA   • JOINT REPLACEMENT         Social History     Socioeconomic History   • Marital status:    Tobacco Use   • Smoking status: Former     Packs/day: 1.00     Years: 20.00     Pack years: 20.00     Types: Cigarettes   • Smokeless tobacco: Never   • Tobacco comments:     30  years ago   Vaping Use   • Vaping Use: Never used  "  Substance and Sexual Activity   • Alcohol use: Yes     Comment: \"rare\"   • Drug use: Never   • Sexual activity: Defer       Family History   Problem Relation Age of Onset   • Ovarian cancer Mother    • Lung cancer Father    • Lung cancer Sister    • Malig Hyperthermia Neg Hx        The following portion of the patient's history were reviewed and updated as appropriate: past medical history, past surgical history, past social history, past family history, allergies, current medications, and problem list.    Review of Systems   Constitutional: Positive for malaise/fatigue.   Cardiovascular: Negative.    Respiratory: Negative.    Hematologic/Lymphatic: Negative.    Neurological: Negative.        Allergies   Allergen Reactions   • Aspirin Unknown - Low Severity   • Oxycodone Unknown - Low Severity   • Hydroxyurea Other (See Comments)     Her hemoglobin drop and was stop in February 2022 and start taking Jakafi   • Diphenhydramine Hcl Anxiety and Unknown (See Comments)     Benadryl IVP         Current Outpatient Medications:   •  acetaminophen (TYLENOL) 500 MG tablet, Take 500 mg by mouth As Needed., Disp: , Rfl:   •  aspirin 81 MG EC tablet, Take 1 tablet by mouth Daily for 23 days., Disp: 23 tablet, Rfl: 0  •  atorvastatin (LIPITOR) 80 MG tablet, Take 1 tablet by mouth Every Night., Disp: 90 tablet, Rfl: 3  •  clopidogrel (PLAVIX) 75 MG tablet, TAKE 1 TABLET DAILY, Disp: 30 tablet, Rfl: 11  •  empagliflozin (JARDIANCE) 10 MG tablet tablet, Take 1 tablet by mouth Daily., Disp: 90 tablet, Rfl: 3  •  furosemide (LASIX) 40 MG tablet, Take 1 tablet by mouth Daily., Disp: 90 tablet, Rfl: 3  •  Jakafi 15 MG chemo tablet, Take 1 tablet by mouth 2 (Two) Times a Day., Disp: 60 tablet, Rfl: 3  •  latanoprost (XALATAN) 0.005 % ophthalmic solution, Administer 1 drop to both eyes., Disp: , Rfl:   •  metoprolol succinate XL (TOPROL-XL) 25 MG 24 hr tablet, 25 mg 2 (Two) Times a Day., Disp: , Rfl:   •  pantoprazole (PROTONIX) 40 MG " "EC tablet, Take 1 tablet by mouth Daily., Disp: 90 tablet, Rfl: 1  •  sertraline (ZOLOFT) 50 MG tablet, Take 50 mg by mouth Daily., Disp: , Rfl:   •  simethicone (MYLICON) 80 MG chewable tablet, Chew 1 tablet by mouth 4 (Four) Times a Day As Needed for Flatulence., Disp: 100 tablet, Rfl: 0  •  sucralfate (CARAFATE) 1 g tablet, Take 1 tablet by mouth 4 (Four) Times a Day Before Meals & at Bedtime for 53 doses., Disp: 60 tablet, Rfl: 0         Objective:     Vitals:    02/24/23 1309   BP: 118/60   BP Location: Right arm   Patient Position: Sitting   Cuff Size: Adult   Pulse: 102   SpO2: 100%   Weight: 65.2 kg (143 lb 12.8 oz)   Height: 154.9 cm (60.98\")     Body mass index is 27.19 kg/m².    PHYSICAL EXAM:    Vitals Reviewed.   General Appearance: No acute distress, well developed and well nourished.     HENT: Hearing loss noted. Wearing mask.   Respiratory: No signs of respiratory distress. Respiration rhythm and depth normal.  Clear to auscultation.   Cardiovascular:  Jugular Venous Pressure: Normal  Heart Rate and Rhythm: Normal, Heart Sounds: Normal S1 and S2. No S3 or S4 noted.  Murmurs: No murmurs noted. No rubs, thrills, or gallops.   Lower Extremities: No edema noted.  Musculoskeletal: Normal movement of extremities.  Skin: General appearance normal.    Psychiatric: Patient alert and oriented to person, place, and time. Speech and behavior appropriate. Normal mood and affect.       ECG 12 Lead    Date/Time: 2/24/2023 1:08 PM  Performed by: Estrellita Blanco APRN  Authorized by: Estrellita Blanco APRN   Comparison: compared with previous ECG from 2/17/2023  Similar to previous ECG  Rhythm: sinus rhythm  Ectopy: atrial premature contractions  Rate: tachycardic  BPM: 102  Conduction: conduction normal  QRS axis: normal  Other findings: non-specific ST-T wave changes and left ventricular hypertrophy    Clinical impression: abnormal EKG              Assessment:       Diagnosis Plan   1. Coronary artery disease " involving native coronary artery of native heart without angina pectoris        2. Chronic diastolic (congestive) heart failure (HCC)        3. Nonbacterial thrombotic endocarditis        4. Melena        5. Paroxysmal atrial fibrillation (HCC)        6. Primary hypertension        7. Tachycardia        8. APC (atrial premature contractions)               Plan:       1.  Coronary Artery Disease: Status post placement to the diagonal in February 2022.  In August 2022, underwent drug-eluting stent placement to the large diagonal branch for stent restenosis.  Noted to have  of the mid RCA and nonobstructive disease of the LAD with medical treatment recommended.  On 3/2, we can stop aspirin and continue with clopidogrel alone.  We will call the patient to remind her.  Denies angina.  Recent hospitalization, she had some mildly elevated troponin levels which were felt to be nonischemic.    2.  Diastolic heart failure: Resolved.  Euvolemic today.    3.  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  Continue SBE prophylaxis.    4.  Melena: She will be undergoing video capsule study in the near future.  She denies melena or abdominal discomfort.  Apixaban currently on hold.    5.  Paroxysmal Atrial Fibrillation: Currently in normal sinus rhythm.  Denies palpitations.  [id number] score of 9.  Determine if she needs to restart apixaban in the future.    6.  Hypertension: BP well controlled.    7/8.  Mildly tachycardic today with APC noted on EKG.  Continue to monitor.    9.  Further recommendations to follow after video capsule study in March.  At that time, we will decide on her medications and make a follow-up appointment with Dr. Barriga.    As always, it has been a pleasure to participate in your patient's care. Thank you.         Sincerely,         ABEL Urbina  [facility] Cardiology      · Dictated utilizing Dragon Dictation  · COVID-19 Precautions - Patient was  compliant in wearing a mask. When I saw the patient, I used appropriate personal protective equipment (PPE) including mask and eye shield (standard procedure).  Additionally, I used gown and gloves if indicated.  Hand hygiene was completed before and after seeing the patient.  · I spent 30 minutes reviewing her medical records/testing/previous office notes/labs, face-to-face interaction with patient, physical examination, formulating the plan of care, and discussion of plan of care with patient.

## 2023-02-27 ENCOUNTER — SPECIALTY PHARMACY (OUTPATIENT)
Dept: PHARMACY | Facility: HOSPITAL | Age: 83
End: 2023-02-27
Payer: MEDICARE

## 2023-02-27 DIAGNOSIS — D75.81 MYELOFIBROSIS: ICD-10-CM

## 2023-02-27 RX ORDER — RUXOLITINIB 15 MG/1
15 TABLET ORAL 2 TIMES DAILY
Qty: 60 TABLET | Refills: 3 | Status: SHIPPED | OUTPATIENT
Start: 2023-02-27

## 2023-02-27 NOTE — TELEPHONE ENCOUNTER
Refill requested from pharmacy. Per last chart note, patient to continue Jakafi. Will route to MD for cosignature.

## 2023-02-27 NOTE — PROGRESS NOTES
Specialty Pharmacy Refill Coordination Note     Catherine is a 83 y.o. female contacted today regarding refills of Jakafi specialty medication(s).    Reviewed and verified with patient:       Specialty medication(s) and dose(s) confirmed: yes  Jakafi 15 mg twice per day    Refill Questions    Flowsheet Row Most Recent Value   Changes to allergies? No   Changes to medications? No   New conditions since last clinic visit No   Unplanned office visit, urgent care, ED, or hospital admission in the last 4 weeks  Yes  [Admitted to hospital]   How does patient/caregiver feel medication is working? Good   Financial problems or insurance changes  No   Since the previous refill, were any specialty medication doses or scheduled injections missed or delayed?  No   Does this patient require a clinical escalation to a pharmacist? No          Delivery Questions    Flowsheet Row Most Recent Value   Delivery method Other (Comment)  [[place] to home address 2/28-$0 copay with [employer]-Address Confirmed]   Delivery address correct? Yes  [Ship to home address]   Delivery phone number [phone]   Preferred delivery time? AM   Number of medications in delivery 1   Medication being filled and delivered Jakafi   Doses left of specialty medications Karen is not sure if pt has a dose for today   Is there any medication that is due not being filled? No   Supplies needed? No supplies needed   Cooler needed? No   Do any medications need mixed or dated? No   Copay form of payment Payment plan already set up   Additional comments $0 copay with [employer]   Questions or concerns for the pharmacist? No   Explain any questions or concerns for the pharmacist N/A   Are any medications first time fills? No        Jakafi delivery coordinated with pts daughter, Karen, for 2/28/2023 to pts home address via [employer]. $0 copay with [employer]. Her last delivery was on 1/17/2023 but she has been hospitalized and held briefly. No  questions or concerns to report to MTM Team today.     Follow-up: 21 day(s)     Isi Mao  Specialty Pharmacy Technician

## 2023-02-28 ENCOUNTER — PREP FOR SURGERY (OUTPATIENT)
Dept: OTHER | Facility: HOSPITAL | Age: 83
End: 2023-02-28
Payer: MEDICARE

## 2023-02-28 ENCOUNTER — TELEPHONE (OUTPATIENT)
Dept: GASTROENTEROLOGY | Facility: CLINIC | Age: 83
End: 2023-02-28
Payer: MEDICARE

## 2023-02-28 RX ORDER — ONDANSETRON 4 MG/1
4 TABLET, FILM COATED ORAL EVERY 8 HOURS PRN
Qty: 90 TABLET | Refills: 0 | Status: SHIPPED | OUTPATIENT
Start: 2023-02-28

## 2023-03-01 ENCOUNTER — CLINICAL SUPPORT (OUTPATIENT)
Dept: GASTROENTEROLOGY | Facility: CLINIC | Age: 83
End: 2023-03-01
Payer: MEDICARE

## 2023-03-01 ENCOUNTER — TELEPHONE (OUTPATIENT)
Dept: GASTROENTEROLOGY | Facility: CLINIC | Age: 83
End: 2023-03-01
Payer: MEDICARE

## 2023-03-01 DIAGNOSIS — D50.0 ANEMIA DUE TO CHRONIC BLOOD LOSS: ICD-10-CM

## 2023-03-01 PROCEDURE — 91110 GI TRC IMG INTRAL ESOPH-ILE: CPT | Performed by: INTERNAL MEDICINE

## 2023-03-01 RX ORDER — METOPROLOL SUCCINATE 25 MG/1
25 TABLET, EXTENDED RELEASE ORAL 2 TIMES DAILY
Qty: 10 TABLET | Refills: 0 | Status: SHIPPED | OUTPATIENT
Start: 2023-03-01 | End: 2023-03-01 | Stop reason: SDUPTHER

## 2023-03-01 RX ORDER — METOPROLOL SUCCINATE 25 MG/1
25 TABLET, EXTENDED RELEASE ORAL 2 TIMES DAILY
Qty: 20 TABLET | Refills: 0 | Status: SHIPPED | OUTPATIENT
Start: 2023-03-01

## 2023-03-01 RX ORDER — METOPROLOL SUCCINATE 25 MG/1
25 TABLET, EXTENDED RELEASE ORAL 2 TIMES DAILY
Qty: 180 TABLET | Refills: 3 | Status: SHIPPED | OUTPATIENT
Start: 2023-03-01 | End: 2023-03-01 | Stop reason: SDUPTHER

## 2023-03-02 RX ORDER — SUCRALFATE 1 G/1
1 TABLET ORAL
Qty: 60 TABLET | Refills: 0 | Status: SHIPPED | OUTPATIENT
Start: 2023-03-02 | End: 2023-03-16

## 2023-03-02 RX ORDER — SUCRALFATE 1 G/1
TABLET ORAL
Qty: 60 TABLET | Refills: 2 | OUTPATIENT
Start: 2023-03-02

## 2023-03-02 NOTE — TELEPHONE ENCOUNTER
I had to refill manually via the chart in order to send to [facility].  The system kept sending me an error.

## 2023-03-03 ENCOUNTER — TELEPHONE (OUTPATIENT)
Dept: GASTROENTEROLOGY | Facility: CLINIC | Age: 83
End: 2023-03-03
Payer: MEDICARE

## 2023-03-03 NOTE — TELEPHONE ENCOUNTER
Call to ABEL Yanes @ [phone].  Per instructions, call to Kristi @ [phone].   Advise per Dr Guzman note.  Verb understanding.     Call to Ila vo.  Advise per DR Guzman note.  Verb understanding.

## 2023-03-03 NOTE — TELEPHONE ENCOUNTER
Kristi ROMAN calling for the pt    They are requesting the capsul study results.    She said that the pt is still nauseated and wonders if she can take something other than zofran for nausea, as it isn't working of her.  She did say the pain has improved, and her bowels are moving well.

## 2023-03-03 NOTE — TELEPHONE ENCOUNTER
Capsule will be read over the weekend - would increase the dose of zofran before changing meds - pls advise pt to take two 4 mg tabs every 8 hours as needed and if it does not improve, could consider compazine.

## 2023-03-06 NOTE — TELEPHONE ENCOUNTER
Please let her know that her capsule study was normal.  Continue to follow-up with hematology regarding her anemia

## 2023-03-07 ENCOUNTER — TELEPHONE (OUTPATIENT)
Dept: GASTROENTEROLOGY | Facility: CLINIC | Age: 83
End: 2023-03-07
Payer: MEDICARE

## 2023-03-07 NOTE — TELEPHONE ENCOUNTER
----- Message from Catherine Boyer sent at 3/6/2023 11:18 AM EST -----  Regarding: Capsule endoscopy results  Contact: [phone]  Good morning,   We are anxious to get the results of the capsule endoscopy as Mom continues to have episodes.  Would you please contact me at [phone] with those results as Mom is extremely [place] and wouldn't be able to grasp the entire conversation?     Thank you  Karen Nguyen

## 2023-03-07 NOTE — TELEPHONE ENCOUNTER
"----- Message from Catherinejanae Boyer sent at 3/2/2023 11:54 AM EST -----  Regarding: F/u 2/15  Contact: [phone]  Thank you for the Zofran rx, however, she has tried that since  Dec/January and she gets no relief.   She had another \"attack\" last night.  I noted she ate crackers and peanut butter right before she laid down, if not actually in bed.  Would this contribute to her symptoms?   We are also anxious to get the results of endoscopy capsule.    Thank you for all your time and energy, it is much appreciated by Mom and her family.   ~Karen   "

## 2023-03-07 NOTE — TELEPHONE ENCOUNTER
Recommend trying to take pantoprazole prior to evening meal and would avoid eating prior to lying down in the event the attacks are precipitated by acid

## 2023-03-14 ENCOUNTER — TELEPHONE (OUTPATIENT)
Dept: GASTROENTEROLOGY | Facility: CLINIC | Age: 83
End: 2023-03-14

## 2023-03-19 NOTE — TELEPHONE ENCOUNTER
----- Message from Catherine Boyer sent at 2/27/2023  3:34 PM EST -----  Regarding: F/u 2/15  Contact: [phone]  This could be a problem.  Since Saturday Mom has had trouble keeping much of anything down.  In fact her PCP is trying to reach your office because she's had nausea and vomiting since early this morning.     -Karen    no

## 2023-03-21 ENCOUNTER — OFFICE VISIT (OUTPATIENT)
Dept: GASTROENTEROLOGY | Facility: CLINIC | Age: 83
End: 2023-03-21
Payer: MEDICARE

## 2023-03-21 VITALS
OXYGEN SATURATION: 94 % | WEIGHT: 142.6 LBS | DIASTOLIC BLOOD PRESSURE: 84 MMHG | TEMPERATURE: 97.5 F | HEART RATE: 92 BPM | HEIGHT: 61 IN | SYSTOLIC BLOOD PRESSURE: 118 MMHG | BODY MASS INDEX: 26.92 KG/M2

## 2023-03-21 DIAGNOSIS — R11.2 NAUSEA AND VOMITING, UNSPECIFIED VOMITING TYPE: ICD-10-CM

## 2023-03-21 DIAGNOSIS — R79.89 ELEVATED LFTS: Primary | ICD-10-CM

## 2023-03-21 PROCEDURE — 3074F SYST BP LT 130 MM HG: CPT | Performed by: INTERNAL MEDICINE

## 2023-03-21 PROCEDURE — 3079F DIAST BP 80-89 MM HG: CPT | Performed by: INTERNAL MEDICINE

## 2023-03-21 PROCEDURE — 99214 OFFICE O/P EST MOD 30 MIN: CPT | Performed by: INTERNAL MEDICINE

## 2023-03-21 PROCEDURE — 1159F MED LIST DOCD IN RCRD: CPT | Performed by: INTERNAL MEDICINE

## 2023-03-21 PROCEDURE — 1160F RVW MEDS BY RX/DR IN RCRD: CPT | Performed by: INTERNAL MEDICINE

## 2023-03-21 RX ORDER — TRAZODONE HYDROCHLORIDE 50 MG/1
25-50 TABLET ORAL
COMMUNITY
Start: 2023-02-28

## 2023-03-21 NOTE — PROGRESS NOTES
Subjective   Chief Complaint   Patient presents with   • Abdominal Pain       Catherine Boyer is a  83 y.o. female here for a follow up visit for abdominal pain and elevated alk phos.     She has felt better in the past week.  No n/v.  Taking pantoprazole once daily.  She thinks she is taking this prior to her evening meal but she is not certain.  She is taking a gas pill 4 times a day.  Bowel movements are regular.  She is eating but eating small amounts.  Weight is stable.  Overall she feels much better.    She had had some bleeding when she was hospitalized recently.  She underwent an outpatient capsule recently which showed no source of anemia or bleeding.  She underwent push enteroscopy and C-scope on 2/9/2023, after Plavix was held for 5 days, with findings of 3 cm hiatal hernia, internal hemorrhoids, diverticulosis.    She had a significant elevation in her alk phos as well as a mild transaminitis.  Repeat labs last week showed modest improvement but continued significant elevation.  Etiology uncertain.  CT while she was hospitalized showed a normal liver and biliary tree.  HPI  Past Medical History:   Diagnosis Date   • Atherosclerosis of abdominal aorta (HCC) 2/5/2023   • Atrial fibrillation (HCC)    • Breast cancer (HCC)    • CAD (coronary artery disease)     NSTEMI 2/2022: 90% ostial LAD, 99% D1, 70% mid-distal LAD (medical therapy). She received two stents (2.5x18 and 2.5x26mm [place] LEFTY) but I don't know which one went to which lesion.   • Carotid atherosclerosis    • Chronic diastolic (congestive) heart failure (HCC)    • COVID 10/29/2022   • GERD (gastroesophageal reflux disease)    • Glaucoma    • History of cataract    • Hypertension    • Microcytic anemia     per Dr. oleg pate office  note 6/30/22-dd   • Myeloproliferative disorder (HCC)     JAK2 positive   • Nonbacterial thrombotic endocarditis     6/2021: 4x5mm vegetation on the ventricular surface of the anterior MV, negative blood cultures   •  Porcelain gallbladder    • PUD (peptic ulcer disease)    • TIA (transient ischemic attack)    • Type 2 diabetes mellitus (HCC)    • Type 2 diabetes mellitus with circulatory disorder, without long-term current use of insulin (HCC) 7/14/2022   • Upper GI bleed      Past Surgical History:   Procedure Laterality Date   • BREAST LUMPECTOMY  1999   • CARDIAC CATHETERIZATION N/A 09/02/2022    Procedure: Coronary angiography;  Surgeon: Guero Verde MD;  Location: [facility] CATH INVASIVE LOCATION;  Service: Cardiovascular;  Laterality: N/A;   • CARDIAC CATHETERIZATION N/A 09/02/2022    Procedure: Stent LEFTY coronary;  Surgeon: Guero Verde MD;  Location: [facility] CATH INVASIVE LOCATION;  Service: Cardiovascular;  Laterality: N/A;   • CATARACT EXTRACTION  2011   • CHOLECYSTECTOMY     • CHOLECYSTECTOMY WITH INTRAOPERATIVE CHOLANGIOGRAM N/A 11/28/2022    Procedure: CHOLECYSTECTOMY LAPAROSCOPIC INTRAOPERATIVE CHOLANGIOGRAM;  Surgeon: Dani Grover Jr., MD;  Location: [facility] MAIN OR;  Service: General;  Laterality: N/A;   • COLONOSCOPY N/A 02/09/2023    Procedure: COLONOSCOPY TO CECUM & T.I.;  Surgeon: Guero Ferris MD;  Location: [facility] ENDOSCOPY;  Service: Gastroenterology;  Laterality: N/A;  PRE- MELENA, GI BLEED  POST- DIVERTICULOSIS, INT HEMORRHOIDS   • ENDOSCOPY N/A 01/25/2023    Procedure: ESOPHAGOGASTRODUODENOSCOPY WITH BIOPSIES;  Surgeon: Charles Diaz MD;  Location: [facility] ENDOSCOPY;  Service: Gastroenterology;  Laterality: N/A;  pre: abd pain, nausea  post: hiatal hernia, mild gastritis, sloughing of the esophagus   • ENTEROSCOPY SMALL BOWEL N/A 02/09/2023    Procedure: ENTEROSCOPY SMALL BOWEL;  Surgeon: Guero Ferris MD;  Location: [facility] ENDOSCOPY;  Service: Gastroenterology;  Laterality: N/A;  PRE- MELENA, GI BLEED  POST- HIATAL HERNIA   • JOINT REPLACEMENT     • UPPER GASTROINTESTINAL ENDOSCOPY         Current Outpatient Medications:   •  acetaminophen (TYLENOL) 500 MG tablet, Take 1 tablet by mouth  As Needed., Disp: , Rfl:   •  atorvastatin (LIPITOR) 80 MG tablet, Take 1 tablet by mouth Every Night., Disp: 90 tablet, Rfl: 3  •  clopidogrel (PLAVIX) 75 MG tablet, TAKE 1 TABLET DAILY, Disp: 30 tablet, Rfl: 11  •  empagliflozin (JARDIANCE) 10 MG tablet tablet, Take 1 tablet by mouth Daily., Disp: 90 tablet, Rfl: 3  •  furosemide (LASIX) 40 MG tablet, Take 1 tablet by mouth Daily., Disp: 90 tablet, Rfl: 3  •  Jakafi 15 MG chemo tablet, Take 1 tablet by mouth 2 (Two) Times a Day., Disp: 60 tablet, Rfl: 3  •  latanoprost (XALATAN) 0.005 % ophthalmic solution, Administer 1 drop to both eyes., Disp: , Rfl:   •  metoprolol succinate XL (TOPROL-XL) 25 MG 24 hr tablet, Take 1 tablet by mouth 2 (Two) Times a Day., Disp: 20 tablet, Rfl: 0  •  ondansetron (Zofran) 4 MG tablet, Take 1 tablet by mouth Every 8 (Eight) Hours As Needed for Nausea or Vomiting., Disp: 90 tablet, Rfl: 0  •  pantoprazole (PROTONIX) 40 MG EC tablet, Take 1 tablet by mouth Daily., Disp: 90 tablet, Rfl: 1  •  sertraline (ZOLOFT) 50 MG tablet, Take 1 tablet by mouth Daily., Disp: , Rfl:   •  simethicone (MYLICON) 80 MG chewable tablet, Chew 1 tablet by mouth 4 (Four) Times a Day As Needed for Flatulence., Disp: 100 tablet, Rfl: 0  •  traZODone (DESYREL) 50 MG tablet, Take 25-50 mg by mouth every night at bedtime., Disp: , Rfl:   PRN Meds:.  Allergies   Allergen Reactions   • Aspirin Unknown - Low Severity   • Oxycodone Unknown - Low Severity   • Hydroxyurea Other (See Comments)     Her hemoglobin drop and was stop in February 2022 and start taking Jakafi   • Diphenhydramine Hcl Anxiety and Unknown (See Comments)     Benadryl IVP     Social History     Socioeconomic History   • Marital status:    Tobacco Use   • Smoking status: Former     Packs/day: 1.00     Years: 20.00     Pack years: 20.00     Types: Cigarettes   • Smokeless tobacco: Never   • Tobacco comments:     30  years ago   Vaping Use   • Vaping Use: Never used   Substance and Sexual  "Activity   • Alcohol use: Yes     Comment: \"rare\"   • Drug use: Never   • Sexual activity: Defer     Family History   Problem Relation Age of Onset   • Ovarian cancer Mother    • Lung cancer Father    • Lung cancer Sister    • Malig Hyperthermia Neg Hx      Review of Systems   Constitutional: Negative for appetite change and unexpected weight change.   Gastrointestinal: Negative for nausea and vomiting.     Vitals:    03/21/23 1549   BP: 118/84   Pulse: 92   Temp: 97.5 °F (36.4 °C)   SpO2: 94%         03/21/23  1549   Weight: 64.7 kg (142 lb 9.6 oz)       Objective   Physical Exam  Constitutional:       Appearance: Normal appearance. She is well-developed.   HENT:      Head: Normocephalic and atraumatic.   Eyes:      General: No scleral icterus.     Conjunctiva/sclera: Conjunctivae normal.   Pulmonary:      Effort: Pulmonary effort is normal.   Abdominal:      General: There is no distension.      Palpations: Abdomen is soft.   Musculoskeletal:      Cervical back: Normal range of motion and neck supple.   Skin:     General: Skin is warm and dry.   Neurological:      Mental Status: She is alert.   Psychiatric:         Mood and Affect: Mood normal.         Behavior: Behavior normal.       No radiology results for the last 7 days    Assessment & Plan   Diagnoses and all orders for this visit:    Elevated LFTs  -     Celiac Ab tTG DGP TIgA  -     Mitochondrial Antibodies, M2  -     Anti-Smooth Muscle Antibody Titer  -     Comprehensive Metabolic Panel  -     IgG    Nausea and vomiting, unspecified vomiting type    Other orders  -     traZODone (DESYREL) 50 MG tablet; Take 25-50 mg by mouth every night at bedtime.      Plan:  · Repeat LFTs today to establish trend as well as additional serologies to work-up the liver function test abnormalities that have been seen recently.  · Continue pantoprazole prior to the evening meal.  Recommend continuing multiple small meals daily.  She usually eats at least 1 bowl of soup she " reports which is easily passed from the stomach and digested.  If she starts to have more episodes of nausea and vomiting, will check a gastric emptying study.

## 2023-03-22 LAB
ALBUMIN SERPL-MCNC: 4.1 G/DL (ref 3.5–5.2)
ALBUMIN/GLOB SERPL: 1.4 G/DL
ALP SERPL-CCNC: 507 U/L (ref 39–117)
ALT SERPL-CCNC: 96 U/L (ref 1–33)
AST SERPL-CCNC: 77 U/L (ref 1–32)
BILIRUB SERPL-MCNC: 0.5 MG/DL (ref 0–1.2)
BUN SERPL-MCNC: 15 MG/DL (ref 8–23)
BUN/CREAT SERPL: 19.5 (ref 7–25)
CALCIUM SERPL-MCNC: 9.2 MG/DL (ref 8.6–10.5)
CHLORIDE SERPL-SCNC: 99 MMOL/L (ref 98–107)
CO2 SERPL-SCNC: 22.2 MMOL/L (ref 22–29)
CREAT SERPL-MCNC: 0.77 MG/DL (ref 0.57–1)
EGFRCR SERPLBLD CKD-EPI 2021: 75.7 ML/MIN/1.73
GLIADIN PEPTIDE IGA SER-ACNC: 5 UNITS (ref 0–19)
GLIADIN PEPTIDE IGG SER-ACNC: 2 UNITS (ref 0–19)
GLOBULIN SER CALC-MCNC: 2.9 GM/DL
GLUCOSE SERPL-MCNC: 126 MG/DL (ref 65–99)
IGA SERPL-MCNC: 465 MG/DL (ref 64–422)
IGG SERPL-MCNC: 1010 MG/DL (ref 586–1602)
POTASSIUM SERPL-SCNC: 4 MMOL/L (ref 3.5–5.2)
PROT SERPL-MCNC: 7 G/DL (ref 6–8.5)
SODIUM SERPL-SCNC: 140 MMOL/L (ref 136–145)
TTG IGA SER-ACNC: <2 U/ML (ref 0–3)
TTG IGG SER-ACNC: <2 U/ML (ref 0–5)

## 2023-03-23 ENCOUNTER — TELEPHONE (OUTPATIENT)
Dept: GASTROENTEROLOGY | Facility: CLINIC | Age: 83
End: 2023-03-23
Payer: MEDICARE

## 2023-03-23 LAB
MITOCHONDRIA M2 IGG SER-ACNC: <20 UNITS (ref 0–20)
SMA IGG SER-ACNC: 11 UNITS (ref 0–19)

## 2023-03-23 NOTE — TELEPHONE ENCOUNTER
Her liver labs have continue to worsen a little.  Other labs were negative in terms of an explanation for this.  I think we should consider a liver biopsy.  Please let me know if she or her daughter wishes to discuss

## 2023-03-27 ENCOUNTER — TELEPHONE (OUTPATIENT)
Dept: GASTROENTEROLOGY | Facility: CLINIC | Age: 83
End: 2023-03-27

## 2023-03-27 ENCOUNTER — TELEPHONE (OUTPATIENT)
Dept: ONCOLOGY | Facility: CLINIC | Age: 83
End: 2023-03-27
Payer: MEDICARE

## 2023-03-27 ENCOUNTER — SPECIALTY PHARMACY (OUTPATIENT)
Dept: ONCOLOGY | Facility: HOSPITAL | Age: 83
End: 2023-03-27
Payer: MEDICARE

## 2023-03-27 NOTE — TELEPHONE ENCOUNTER
Caller: RAY RUGGIERO    Relationship: Emergency Contact    Best call back number: [phone]    What is the best time to reach you: ANYTIME    Who are you requesting to speak with (clinical staff, provider, specific staff member): SCHEDULING    What was the call regarding: PLEASE CALL RAY TO R/S PTS 3/31 APPTS.     Do you require a callback: YES

## 2023-03-27 NOTE — TELEPHONE ENCOUNTER
Hub staff attempted to follow warm transfer process and was unsuccessful     Caller:     Relationship to patient:     Best call back number: [phone]    Patient is needing: DAUGHTER RETURNING CALL.

## 2023-03-27 NOTE — TELEPHONE ENCOUNTER
Called pt's daughter , Ila and left  for her to call back.     Also called mobile number ( pt daughter Karen) left  for her to call back.

## 2023-03-27 NOTE — TELEPHONE ENCOUNTER
Returned to call daughter Karen and advised of Dr Guzman's note. Verb understanding.     She advised she will speak with her siblings and decide what to do . She wanted to be sure that Dr Guzman knows that her mother has myelodysplastic syndrome. Advised will update Dr Guzman.

## 2023-03-27 NOTE — PROGRESS NOTES
Specialty Pharmacy Refill Coordination Note     Catherine is a 83 y.o. female contacted today regarding refills of Jakafi specialty medication(s).    Reviewed and verified with patient:       Specialty medication(s) and dose(s) confirmed: yes  Jakafi 15 mg twice per day    Refill Questions    Flowsheet Row Most Recent Value   Changes to allergies? No   Changes to medications? No   New conditions since last clinic visit No   Unplanned office visit, urgent care, ED, or hospital admission in the last 4 weeks  No   How does patient/caregiver feel medication is working? Good   Financial problems or insurance changes  No   Since the previous refill, were any specialty medication doses or scheduled injections missed or delayed?  No   Does this patient require a clinical escalation to a pharmacist? No          Delivery Questions    Flowsheet Row Most Recent Value   Delivery method FedEx  [[employer] Priority-Ship 3/28 for delivery 3/29-$0 copay with [employer]-Address Confirmed-Note to : Take to room 203]   Delivery address correct? Yes  [Ship to home address]   Delivery phone number [phone]   Preferred delivery time? AM   Number of medications in delivery 1   Medication being filled and delivered Jakafi   Doses left of specialty medications Karen states 5-7 days   Is there any medication that is due not being filled? No   Supplies needed? No supplies needed   Cooler needed? No   Do any medications need mixed or dated? No   Copay form of payment Payment plan already set up   Additional comments $0 copay with [employer]   Questions or concerns for the pharmacist? No   Explain any questions or concerns for the pharmacist N/A   Are any medications first time fills? No        Jakafi delivery coordinated with Karen, pts daughter, for 3/28/2023 to Catherine's home address. $0 copay with [employer]. Her last delivery was on 2/28/2023. No questions or concerns to report to MTM Team today.     Follow-up: 21  lon(s)     Isi Mao  Specialty Pharmacy Technician

## 2023-03-30 NOTE — TELEPHONE ENCOUNTER
Hub staff attempted to follow warm transfer process and was unsuccessful     Caller: CONCETTA SUGGS    Relationship to patient: PCP    Best call back number: [phone]    Patient is needing: PCP CALLED IN ON BEHALF OF MARIBELL. CONCETTA STATES SHE IS REPORTING HER ELEVATED LIVER ENZYMES AND IS FAXING OVER DOCUMENTATION SHE IS ALSO WANTINGTO PLACE ORDERS PLEASE CALL CONCETTA BACK AT THE NUMBER LISTED ABOVE THIS IS HER PERSONAL CELL PHONE ASAP         
Returned call to pts PCP, who stated she is concerned ab her alkaline phosphatase level on her most recent labs (they have been scanned under the media tab for you to review.)    Neema is concerned this elevation may have something to do with her cancer history.    She is scheduled to see Dr Guzman 3/21/23  
See media tab for outside labs.      Update to DR Guzman.   
Time-based billing (NON-critical care)
Time-based billing (NON-critical care)

## 2023-04-04 ENCOUNTER — TELEPHONE (OUTPATIENT)
Dept: CARDIOLOGY | Facility: CLINIC | Age: 83
End: 2023-04-04
Payer: MEDICARE

## 2023-04-04 ENCOUNTER — TELEPHONE (OUTPATIENT)
Dept: ONCOLOGY | Facility: CLINIC | Age: 83
End: 2023-04-04
Payer: MEDICARE

## 2023-04-04 NOTE — TELEPHONE ENCOUNTER
----- Message from [place] CMA sent at 4/4/2023  3:01 PM EDT -----  Regarding: FW: Follow up appt  Contact: [phone]    ----- Message -----  From: Catherine Boyer  Sent: 4/4/2023  10:15 AM EDT  To: [facility]  Subject: Follow up appt                                   Good morning,   When should we schedule a follow up?  The capsule test was done with Dr. Guzman and was negative.  Mom is feeling very good.  There are some questions about her liver profile and gastroenterology is following that.      Let me know and we will get the f/u scheduled with Dr. Barriga.     Thanks,   Karen

## 2023-04-04 NOTE — TELEPHONE ENCOUNTER
----- Message from Nicki Sorensen RN sent at 4/4/2023 10:21 AM EDT -----  Can you get her back on the schedule to see Corky

## 2023-04-04 NOTE — TELEPHONE ENCOUNTER
Sent response via [employer] to Rosemary/seth (ok per Hipaa) that she needs to f/u 3 months from last appt, which would be May 2023.    jasmin weeks  4/4/23

## 2023-04-06 ENCOUNTER — TELEPHONE (OUTPATIENT)
Dept: GASTROENTEROLOGY | Facility: CLINIC | Age: 83
End: 2023-04-06
Payer: MEDICARE

## 2023-04-06 NOTE — TELEPHONE ENCOUNTER
----- Message from Catherine Boyer sent at 4/6/2023 11:41 AM EDT -----  Regarding: Liver biopsy  Contact: [phone]  Good morning,     I tried to reach your office by phone but was unsuccessful.  Mom (Catherine Boyer) would like to proceed with liver biopsy.  For reference, family is available to bring her and then spend the day with her on April 25.  Do we schedule with radiology or does your office?     Thanks,   Karen Nguyen

## 2023-04-07 NOTE — PROGRESS NOTES
"[facility] CBC GROUP OUTPATIENT FOLLOW UP CLINIC VISIT    REASON FOR FOLLOW-UP:    Myelofibrosis, on Jakafi    HISTORY OF PRESENT ILLNESS:  Catherine Boyer is a 83 y.o. female with the above-mentioned she returns today for follow-up.    She states that she feels well at this point.  She remains active.  She denies any abdominal pain.  No bleeding.  No fevers or chills.    REVIEW OF SYSTEMS:  As per HPI    PHYSICAL EXAMINATION:    Vitals:    04/10/23 1305   BP: 146/70   Pulse: 87   Resp: 18   Temp: 97.1 °F (36.2 °C)   TempSrc: Temporal   SpO2: 94%   Weight: 65.8 kg (145 lb 1.6 oz)   Height: 154.9 cm (60.98\")   PainSc: 0-No pain      General:  No acute distress, awake, alert and oriented.  Ambulatory with a rolling walker.  Skin:  Warm and dry, no visible rash  HEENT:  Normocephalic/atraumatic.  Wearing a face mask.  Chest:  Normal respiratory effort.  Lungs clear to auscultation bilaterally.  Heart: Regular rate and rhythm  Extremities:  No visible clubbing, cyanosis, or edema  Neuro/psych:  Grossly nonfocal.  Normal mood and affect.    DIAGNOSTIC DATA:  CBC & Differential (04/10/2023 12:22)      IMAGING:  None reviewed    ASSESSMENT:  This is a 83 y.o. female with:    *JAK2 positive myeloproliferative disorder, perhaps primary myelofibrosis  · She has been seen by Dr. Gerard Foreman with [facility] specialists and cancer and blood disorders in [place].      · She was seen there initially on 6/4/2021 with leukocytosis with a white blood cell count of 104,000.  Hemoglobin was normal at 11.6 and platelets were normal at 299,000.  PCR for BCR/ABL and FISH for BCR/ABL were negative.  A bone marrow aspiration was attempted on 6/9/2021 which was unsuccessful.  She then went to [place] for the summer.  She was admitted to the [facility] between 6/29/2021 and 7/9/2021.  She had a bone marrow aspiration and biopsy performed there on 6/30/2021 showing a hypercellular marrow at greater than 95% with 1% blasts.  Focal " 1+ reticulin fibrosis was noted.  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  PCR for BCR/ABL was negative.  JAK2 V617F PCR was positive.  Cytogenetics were normal.  Next generation myeloid disorder profiling of the bone marrow aspirate from 6/30/2021 showed TET2 V9416R and U6231Hid*47 abnormalities and JAK2 V617F.  Therefore she was suspected to have early primary myelofibrosis and she was started on hydroxyurea 1000 mg daily.  Subsequently, hydroxyurea was held.  Blood counts had improved.  She was admitted to the hospital from 2/9 through 2/15/2022 for symptomatic anemia and chest pain.  Her hemoglobin was 5.5.  The white blood cell count was 7.6.  Platelets were 56,000.  Fecal occult blood testing was negative.  She received 3 units of packed red blood cells.  No endoscopy was performed.  A left heart catheterization was performed with a drug-eluting stent placed to the second diagonal on 2/11/2022 and she was discharged from that hospitalization on aspirin 81 mg, Plavix 75 mg, and Eliquis 5 mg twice daily.  Blood counts improved by 3/7/2022 and her white blood cell count was 7.2 with a hemoglobin 11.4 and platelets 295,000.  She did have a bone marrow aspiration and biopsy on 3/7/2022 showing chronic myeloproliferative neoplasm with potentially post ET fibrosis or consideration of primary myelofibrosis.  There was no evidence for myelodysplasia.  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  She also has atrial fibrillation and is anticoagulated with Eliquis.    · Hydroxyurea was discontinued and she was started on ruxolitinib 15 mg twice daily at her visit on 3/14/2022 with Dr. Gerard Foreman.  · She had CT imaging of the chest abdomen pelvis on 3/19/2022 that did not demonstrate any splenomegaly.  Calcified mediastinal lymphadenopathy was noted.  Borderline pretracheal and right hilar lymphadenopathy noted.   Small bilateral pleural effusions with bibasilar atelectasis noted.  · She has moved from [place] to [place] and is seen initially in the office on 5/15/2022.  She tolerates ruxolitinib well.  White blood cell count has decreased from 5.9 from 19.6.  Platelets have normalized at 170,000, down from 464,000.  The hemoglobin has also decreased at 8.6 with an MCV of 98.9.  · 5/26/2022: White blood cell count mildly elevated at 12.4 with a normal platelet count at 216,000.  She tolerates Jakafi very well.   · 6/30/2022: White blood cell count a little higher.  Continue monitoring.  · 9/13/2022: White blood cell count higher at 32,000  · Flow cytometry 9/13/2022 with 0.1% myeloblasts with a typical (normal) phenotype.  Nonspecific monocyte population.  · Bone marrow aspiration and biopsy on 10/7/2022 with hypercellular marrow at 95% with involvement by chronic myeloproliferative neoplasm, less than 5% blasts.  Mild reticulin fibrosis.  Decreased iron stores.  Consideration of CMML.  Flow cytometry showed a dim CD56 positive aberrant monocyte population at 8.2 percent of events.  Cytogenetics pending.  NGS pending.  · 10/26/2022: White blood cell count improved at 21,000.  • Patient admitted 10/29/2022 with COVID-19 infection.  Jakafi held.  • Resumed Jakafi 15 mg twice daily on 11/1/2022.  • WBC stable at 51,000  • On 2/17/2023 a CT scan of the abdomen and pelvis showed improved splenomegaly at 14.5 cm, previously 16 cm  • 4/10/2023: White blood cell count reasonably stable at 66,000    *Microcytic anemia  • 4/10/2023: Hemoglobin 10.9, improving from 10.3, 9.7     *History of marantic endocarditis and atrial fibrillation  · Anticoagulated with Eliquis 5 mg twice daily     *Recent admission with heart failure, positive stress test and LEFTY to a large diagonal branch for in sent stenosis.    *Abnormal gallbladder on CT  • Patient had developed epigastric pain at the time of admission  • CT abdomen and pelvis 10/29/2022  showed evidence of a porcelain gallbladder  • Patient is aware of this diagnosis.  She does not wish to pursue further evaluation.  She did have some brief epigastric/right upper quadrant pain which has now resolved.  • Cholecystectomy by Dr. Dani Grover Jr. on 11/28/2022    *COVID-19 infection  • Patient presented to ER on 10/29/2022 with progressive generalized weakness x10 days  • Patient tested positive for COVID-19 on admission 10/29/2022  • Elevated lactate 3.0  • Chest x-ray 10/29/2022 with no evidence of consolidation  • Patient was started on dexamethasone and remdesivir  • Improved    *GI bleeding February 2023  · Presented with abdominal pain, melena.  Hemoglobin 6.1 at presentation.  • EGD on 1/25/2023 had noted mild mucosal changes characterized by slowing in the lower third of the esophagus.  Patchy mild inflammation characterized by congestion and erythema found in the gastric body.  The examined duodenum was normal.  Multiple biopsies taken.  • Suspected bleeding from prior biopsy site  • Anticoagulation with Eliquis and Plavix was held.  On PPI  • GI follow-up- EGD/push enteroscopy and colonoscopy when Eliquis held 5 days.    • Procedures occurred on 2/9/2023.  Nonbleeding internal hemorrhoids.  No source of bleeding identified.    *Elevated transaminases  · She has been seen by gastroenterology  · Last AST 77 and ALT 96 with an alkaline phosphatase of 507  · Anti-smooth muscle antibodies and mitochondrial antibodies normal  · A liver biopsy has been suggested    PLAN:   1. Continue Jakafi 15 mg twice daily.  2. Continue lavix 75 mg daily.  She is no longer anticoagulated with Eliquis.  3. We will continue to monitor liver labs.  Dr. Guzman has suggested a liver biopsy.  The patient is not necessarily inclined to proceed with this.  I agree.  We can continue to monitor.  4. Follow-up with me in about 4 to 6 weeks with a CBC and CMP.  Follow-up pending CMP from today.    High risk medication  requiring intensive monitoring    Orders Placed This Encounter   Procedures   • Comprehensive Metabolic Panel     Standing Status:   Future     Number of Occurrences:   1     Standing Expiration Date:   4/10/2024     Order Specific Question:   Release to patient     Answer:   Routine Release   • Comprehensive Metabolic Panel     Standing Status:   Future     Standing Expiration Date:   4/9/2024     Order Specific Question:   Release to patient     Answer:   Routine Release   • CBC & Differential     Standing Status:   Future     Standing Expiration Date:   4/9/2024     Order Specific Question:   Manual Differential     Answer:   No       Darrell Reinoso MD  04/10/2023

## 2023-04-10 ENCOUNTER — SPECIALTY PHARMACY (OUTPATIENT)
Dept: ONCOLOGY | Facility: HOSPITAL | Age: 83
End: 2023-04-10
Payer: MEDICARE

## 2023-04-10 ENCOUNTER — OFFICE VISIT (OUTPATIENT)
Dept: ONCOLOGY | Facility: CLINIC | Age: 83
End: 2023-04-10
Payer: MEDICARE

## 2023-04-10 ENCOUNTER — LAB (OUTPATIENT)
Dept: LAB | Facility: HOSPITAL | Age: 83
End: 2023-04-10
Payer: MEDICARE

## 2023-04-10 ENCOUNTER — PRIOR AUTHORIZATION (OUTPATIENT)
Dept: ONCOLOGY | Facility: CLINIC | Age: 83
End: 2023-04-10
Payer: MEDICARE

## 2023-04-10 VITALS
HEART RATE: 87 BPM | HEIGHT: 61 IN | SYSTOLIC BLOOD PRESSURE: 146 MMHG | RESPIRATION RATE: 18 BRPM | WEIGHT: 145.1 LBS | TEMPERATURE: 97.1 F | BODY MASS INDEX: 27.4 KG/M2 | DIASTOLIC BLOOD PRESSURE: 70 MMHG | OXYGEN SATURATION: 94 %

## 2023-04-10 DIAGNOSIS — D47.1 MYELOPROLIFERATIVE DISORDER: ICD-10-CM

## 2023-04-10 DIAGNOSIS — D47.1 MYELOPROLIFERATIVE DISORDER: Primary | ICD-10-CM

## 2023-04-10 DIAGNOSIS — D53.9 MACROCYTIC ANEMIA: ICD-10-CM

## 2023-04-10 LAB
ALBUMIN SERPL-MCNC: 3.8 G/DL (ref 3.5–5.2)
ALBUMIN/GLOB SERPL: 1.4 G/DL (ref 1.1–2.4)
ALP SERPL-CCNC: 345 U/L (ref 38–116)
ALT SERPL W P-5'-P-CCNC: 48 U/L (ref 0–33)
ANION GAP SERPL CALCULATED.3IONS-SCNC: 16.9 MMOL/L (ref 5–15)
AST SERPL-CCNC: 52 U/L (ref 0–32)
BILIRUB SERPL-MCNC: 0.4 MG/DL (ref 0.2–1.2)
BUN SERPL-MCNC: 16 MG/DL (ref 6–20)
BUN/CREAT SERPL: 22.5 (ref 7.3–30)
CALCIUM SPEC-SCNC: 9.3 MG/DL (ref 8.5–10.2)
CHLORIDE SERPL-SCNC: 99 MMOL/L (ref 98–107)
CO2 SERPL-SCNC: 24.1 MMOL/L (ref 22–29)
CREAT SERPL-MCNC: 0.71 MG/DL (ref 0.6–1.1)
DEPRECATED RDW RBC AUTO: 65.9 FL (ref 37–54)
EGFRCR SERPLBLD CKD-EPI 2021: 83.4 ML/MIN/1.73
ERYTHROCYTE [DISTWIDTH] IN BLOOD BY AUTOMATED COUNT: 18.6 % (ref 12.3–15.4)
GLOBULIN UR ELPH-MCNC: 2.8 GM/DL (ref 1.8–3.5)
GLUCOSE SERPL-MCNC: 249 MG/DL (ref 74–124)
HCT VFR BLD AUTO: 33.5 % (ref 34–46.6)
HGB BLD-MCNC: 10.9 G/DL (ref 12–15.9)
HYPOCHROMIA BLD QL: ABNORMAL
LYMPHOCYTES # BLD MANUAL: 5.95 10*3/MM3 (ref 0.7–3.1)
LYMPHOCYTES NFR BLD MANUAL: 4 % (ref 5–12)
MCH RBC QN AUTO: 31.9 PG (ref 26.6–33)
MCHC RBC AUTO-ENTMCNC: 32.5 G/DL (ref 31.5–35.7)
MCV RBC AUTO: 98 FL (ref 79–97)
METAMYELOCYTES NFR BLD MANUAL: 5 % (ref 0–0)
MONOCYTES # BLD: 2.65 10*3/MM3 (ref 0.1–0.9)
MYELOCYTES NFR BLD MANUAL: 2 % (ref 0–0)
NEUTROPHILS # BLD AUTO: 52.91 10*3/MM3 (ref 1.7–7)
NEUTROPHILS NFR BLD MANUAL: 73 % (ref 42.7–76)
NEUTS BAND NFR BLD MANUAL: 7 % (ref 0–5)
PLAT MORPH BLD: NORMAL
PLATELET # BLD AUTO: 378 10*3/MM3 (ref 140–450)
PMV BLD AUTO: 11.7 FL (ref 6–12)
POTASSIUM SERPL-SCNC: 3.6 MMOL/L (ref 3.5–4.7)
PROT SERPL-MCNC: 6.6 G/DL (ref 6.3–8)
RBC # BLD AUTO: 3.42 10*6/MM3 (ref 3.77–5.28)
SODIUM SERPL-SCNC: 140 MMOL/L (ref 134–145)
STOMATOCYTES BLD QL SMEAR: ABNORMAL
VARIANT LYMPHS NFR BLD MANUAL: 9 % (ref 19.6–45.3)
WBC MORPH BLD: NORMAL
WBC NRBC COR # BLD: 66.14 10*3/MM3 (ref 3.4–10.8)

## 2023-04-10 PROCEDURE — 1126F AMNT PAIN NOTED NONE PRSNT: CPT | Performed by: INTERNAL MEDICINE

## 2023-04-10 PROCEDURE — 85025 COMPLETE CBC W/AUTO DIFF WBC: CPT

## 2023-04-10 PROCEDURE — 99214 OFFICE O/P EST MOD 30 MIN: CPT | Performed by: INTERNAL MEDICINE

## 2023-04-10 PROCEDURE — 1159F MED LIST DOCD IN RCRD: CPT | Performed by: INTERNAL MEDICINE

## 2023-04-10 PROCEDURE — 3077F SYST BP >= 140 MM HG: CPT | Performed by: INTERNAL MEDICINE

## 2023-04-10 PROCEDURE — 80053 COMPREHEN METABOLIC PANEL: CPT

## 2023-04-10 PROCEDURE — 88185 FLOWCYTOMETRY/TC ADD-ON: CPT

## 2023-04-10 PROCEDURE — 88184 FLOWCYTOMETRY/ TC 1 MARKER: CPT

## 2023-04-10 PROCEDURE — 36415 COLL VENOUS BLD VENIPUNCTURE: CPT

## 2023-04-10 PROCEDURE — 88182 CELL MARKER STUDY: CPT

## 2023-04-10 PROCEDURE — 1160F RVW MEDS BY RX/DR IN RCRD: CPT | Performed by: INTERNAL MEDICINE

## 2023-04-10 PROCEDURE — 85007 BL SMEAR W/DIFF WBC COUNT: CPT

## 2023-04-10 PROCEDURE — 3078F DIAST BP <80 MM HG: CPT | Performed by: INTERNAL MEDICINE

## 2023-04-10 NOTE — PROGRESS NOTES
Specialty Pharmacy Patient Management Program  Oncology 6-Month Clinical Assessment       Catherine Boyer is a 83 y.o. female with myeloproliferative disorder seen today to assess adherence and side effects.    Regimen: Jakafi 15 mg po bid    Reason for Outreach: Routine medication check-in .       Problem list reviewed by Lidia Frazier RPH on 4/10/2023 at  1:32 PM  Medicines reviewed by Lidia Frazier RPH on 4/10/2023 at  1:32 PM  Allergies reviewed by Lidia Frazier RPH on 4/10/2023 at  1:32 PM     Goals     • Specialty Pharmacy General Goal      adherence          Medication Assessment:  • Medication Assessment  o Follow Up Clinical Assessment  o Medication(s) assessed: jakafi  o Therapeutic appropriateness: Appropriate  o Medication tolerability: Tolerating with no to minimal ADRs  o Medication plan: Continue therapy with normal follow-up  o Quality of Life Improvement Scale: No change  o Administration: Patient is taking every day at the same time  and twice daily.   o Patient can self administer medications: yes  o Medication Follow-Up Plan: Next clinical assessment  • Lab Review: The labs listed below have been reviewed. No dose adjustments are needed for the oral specialty medication(s) based on the labs.    Lab Results   Component Value Date    GLUCOSE 126 (H) 03/21/2023    CALCIUM 9.2 03/21/2023     03/21/2023    K 4.0 03/21/2023    CO2 22.2 03/21/2023    CL 99 03/21/2023    BUN 15 03/21/2023    CREATININE 0.77 03/21/2023    EGFRIFAFRI 81 07/09/2021    EGFRIFNONA 71 07/09/2021    BCR 19.5 03/21/2023    ANIONGAP 11.0 02/17/2023     Lab Results   Component Value Date    WBC 66.14 (H) 04/10/2023    RBC 3.42 (L) 04/10/2023    HGB 10.9 (L) 04/10/2023    HCT 33.5 (L) 04/10/2023    MCV 98.0 (H) 04/10/2023    MCH 31.9 04/10/2023    MCHC 32.5 04/10/2023    RDW 18.6 (H) 04/10/2023    RDWSD 65.9 (H) 04/10/2023    MPV 11.7 04/10/2023     04/10/2023    NEUTRORELPCT 73 02/15/2023    LYMPHORELPCT 9  02/15/2023    MONORELPCT 6 02/15/2023    EOSRELPCT 2 02/15/2023    BASORELPCT 2 02/15/2023    AUTOIGPER 11.5 (H) 12/19/2022    NEUTROABS 31.93 (H) 02/17/2023    LYMPHSABS 4.4 (H) 02/15/2023    MONOSABS 3.0 (H) 02/15/2023    EOSABS 1.0 (H) 02/15/2023    BASOSABS 1.0 (H) 02/15/2023    AUTOIGNUM 5.92 (H) 12/19/2022    NRBC 0.1 02/08/2023     • Drug-drug interactions  o Completed medication reconciliation today to assess for drug interactions. Patient denies starting or stopping any medications.  Eliquis dc'd  o Assessed medication list for interactions, no significant drug interactions noted.   o Advised patient to call the clinic if any new medications are started so we can assess for drug-drug interactions.  • Drug-food interactions discussed: eating grapefruit and drinking grapefruit juice  • Vaccines are coordinated by the patient's oncologist and primary care provider.    Allergies  Known allergies and reactions were discussed with the patient. The patient's chart has been reviewed for allergy information and updated as necessary.   Allergies   Allergen Reactions   • Aspirin Unknown - Low Severity   • Oxycodone Unknown - Low Severity   • Hydroxyurea Other (See Comments)     Her hemoglobin drop and was stop in February 2022 and start taking Jakafi   • Diphenhydramine Hcl Anxiety and Unknown (See Comments)     Benadryl IVP        Hospitalizations and Urgent Care Visits Since Last Assessment:  • Unplanned hospitalizations or inpatient admissions: yes  • ED Visits: yes  • Urgent Office Visits: no    Adherence Assessment:  Adherence Questions  Medication(s) assessed: jakafi  On average, how many doses/injections does the patient miss per month?: 0  What are the identified reasons for non-adherence or missed doses? : no problems identfied  What is the estimated medication adherence level?: %  Based on the patient/caregiver response and refill history, does this patient require an MTP to track adherence  improvements?: no    Quality of Life Assessment:  Quality of Life Assessment  Quality of Life Improvement Scale: No change  -- Quality of Life: 6/10    Financial Assessment:  Medication availability/affordability: Patient has had no issues obtaining medication from pharmacy.      All questions addressed and patient had no additional concerns. Patient has pharmacy contact information.    Name/Credentials: Ldiia Frazier PharmD, BCPS    4/10/2023  13:33 EDT

## 2023-04-11 NOTE — TELEPHONE ENCOUNTER
rcvd message from Dr Reinoso regarding liver biopsy - patient has decided not to proceed per Dr Reinoso

## 2023-04-13 LAB — REF LAB TEST METHOD: NORMAL

## 2023-04-21 ENCOUNTER — SPECIALTY PHARMACY (OUTPATIENT)
Dept: PHARMACY | Facility: HOSPITAL | Age: 83
End: 2023-04-21
Payer: MEDICARE

## 2023-04-21 NOTE — PROGRESS NOTES
Specialty Pharmacy Refill Coordination Note     Catherine is a 83 y.o. female contacted today regarding refills of Jakafi specialty medication(s).    Reviewed and verified with patient:       Specialty medication(s) and dose(s) confirmed: yes  Jakafi 15 mg twice per day.    Refill Questions    Flowsheet Row Most Recent Value   Changes to allergies? No   Changes to medications? No   New conditions since last clinic visit No  [Pt had a stomach bug last Saturday.]   Unplanned office visit, urgent care, ED, or hospital admission in the last 4 weeks  No   How does patient/caregiver feel medication is working? Good   Financial problems or insurance changes  No   Since the previous refill, were any specialty medication doses or scheduled injections missed or delayed?  No   Does this patient require a clinical escalation to a pharmacist? No          Delivery Questions    Flowsheet Row Most Recent Value   Delivery method [employer]  [[employer] Priority-Ship 4/24 for delivery 4/25-$0 copay with [facility]-Address Confirmed]   Delivery address correct? Yes   Delivery phone number [phone]   Preferred delivery time? AM   Number of medications in delivery 1   Medication being filled and delivered Jakafi   Doses left of specialty medications Karen states 5-7 days   Is there any medication that is due not being filled? No   Supplies needed? No supplies needed   Cooler needed? No   Do any medications need mixed or dated? No   Copay form of payment Payment plan already set up   Additional comments $0 copay with [employer]   Questions or concerns for the pharmacist? No   Explain any questions or concerns for the pharmacist N/A   Are any medications first time fills? No        Jakafi delivery coordinated with Karen, pts daughter, for 4/25/2023 to pt's home address. $0 copay with [employer]. Her last delivery was on 3/29/2023. No questions or concerns to report to MTM Team today.     Follow-up: 21 day(s)     Isi LEAL  [place]  Specialty Pharmacy Technician

## 2023-04-25 ENCOUNTER — TELEPHONE (OUTPATIENT)
Dept: CARDIOLOGY | Facility: CLINIC | Age: 83
End: 2023-04-25
Payer: MEDICARE

## 2023-04-25 NOTE — TELEPHONE ENCOUNTER
Noted. I have called patient and recommended that she restart apixiban 5 mg 1 tablet twice per day. She will need to call with any concerns about bleeding. FU with Dr. Barriga in 3 months.  Patient said her daughter will call back to schedule with Dr. Barriga in 3 months.

## 2023-04-25 NOTE — TELEPHONE ENCOUNTER
----- Message from Albin Barriga MD sent at 4/25/2023 12:28 PM EDT -----  I d/w Dr Reinoso and Dr Guzman -- neither have a contraindication to resume apixaban. If she were to become severely anemic again while on it, then it would have to be permanently stopped.    Jdk    ----- Message -----  From: Annika Guzman MD  Sent: 4/25/2023  11:35 AM EDT  To: Albin Barriga MD    She had a comprehensive w/u with high risk findings seen and no source of bleeding identified - I doubt have a solid contraindication based on her findings.    LB  ----- Message -----  From: Albin Barriga MD  Sent: 4/24/2023   3:52 PM EDT  To: Annika Guzman MD    Do you feel that I could restart AC on this patient?      jdk

## 2023-05-03 ENCOUNTER — TELEPHONE (OUTPATIENT)
Dept: GASTROENTEROLOGY | Facility: CLINIC | Age: 83
End: 2023-05-03

## 2023-05-03 ENCOUNTER — HOSPITAL ENCOUNTER (INPATIENT)
Facility: HOSPITAL | Age: 83
LOS: 12 days | Discharge: HOME HEALTH | End: 2023-05-16
Attending: EMERGENCY MEDICINE | Admitting: INTERNAL MEDICINE
Payer: MEDICARE

## 2023-05-03 ENCOUNTER — TELEPHONE (OUTPATIENT)
Dept: ONCOLOGY | Facility: CLINIC | Age: 83
End: 2023-05-03
Payer: MEDICARE

## 2023-05-03 DIAGNOSIS — R63.4 WEIGHT LOSS: ICD-10-CM

## 2023-05-03 DIAGNOSIS — R11.2 NAUSEA VOMITING AND DIARRHEA: ICD-10-CM

## 2023-05-03 DIAGNOSIS — R19.7 NAUSEA VOMITING AND DIARRHEA: ICD-10-CM

## 2023-05-03 DIAGNOSIS — E87.6 HYPOKALEMIA: ICD-10-CM

## 2023-05-03 DIAGNOSIS — N17.9 ACUTE RENAL FAILURE, UNSPECIFIED ACUTE RENAL FAILURE TYPE: Primary | ICD-10-CM

## 2023-05-03 LAB
ADV 40+41 DNA STL QL NAA+NON-PROBE: NOT DETECTED
ALBUMIN SERPL-MCNC: 3.6 G/DL (ref 3.5–5.2)
ALBUMIN/GLOB SERPL: 1.3 G/DL
ALP SERPL-CCNC: 282 U/L (ref 39–117)
ALT SERPL W P-5'-P-CCNC: 25 U/L (ref 1–33)
ANION GAP SERPL CALCULATED.3IONS-SCNC: 15 MMOL/L (ref 5–15)
AST SERPL-CCNC: 19 U/L (ref 1–32)
ASTRO TYP 1-8 RNA STL QL NAA+NON-PROBE: NOT DETECTED
BACTERIA UR QL AUTO: ABNORMAL /HPF
BASOPHILS # BLD MANUAL: 0.47 10*3/MM3 (ref 0–0.2)
BASOPHILS NFR BLD MANUAL: 1 % (ref 0–1.5)
BILIRUB SERPL-MCNC: 0.5 MG/DL (ref 0–1.2)
BILIRUB UR QL STRIP: NEGATIVE
BUN SERPL-MCNC: 43 MG/DL (ref 8–23)
BUN/CREAT SERPL: 23.9 (ref 7–25)
BURR CELLS BLD QL SMEAR: ABNORMAL
C CAYETANENSIS DNA STL QL NAA+NON-PROBE: NOT DETECTED
C COLI+JEJ+UPSA DNA STL QL NAA+NON-PROBE: NOT DETECTED
C DIFF GDH + TOXINS A+B STL QL IA.RAPID: NEGATIVE
C DIFF TOX GENS STL QL NAA+PROBE: POSITIVE
CALCIUM SPEC-SCNC: 8.3 MG/DL (ref 8.6–10.5)
CHLORIDE SERPL-SCNC: 100 MMOL/L (ref 98–107)
CLARITY UR: ABNORMAL
CO2 SERPL-SCNC: 20 MMOL/L (ref 22–29)
COLOR UR: YELLOW
CREAT SERPL-MCNC: 1.8 MG/DL (ref 0.57–1)
CRYPTOSP DNA STL QL NAA+NON-PROBE: NOT DETECTED
D-LACTATE SERPL-SCNC: 1.8 MMOL/L (ref 0.5–2)
DEPRECATED RDW RBC AUTO: 54.9 FL (ref 37–54)
E HISTOLYT DNA STL QL NAA+NON-PROBE: NOT DETECTED
EAEC PAA PLAS AGGR+AATA ST NAA+NON-PRB: NOT DETECTED
EC STX1+STX2 GENES STL QL NAA+NON-PROBE: NOT DETECTED
EGFRCR SERPLBLD CKD-EPI 2021: 27.3 ML/MIN/1.73
EPEC EAE GENE STL QL NAA+NON-PROBE: NOT DETECTED
ERYTHROCYTE [DISTWIDTH] IN BLOOD BY AUTOMATED COUNT: 16.7 % (ref 12.3–15.4)
ETEC LTA+ST1A+ST1B TOX ST NAA+NON-PROBE: NOT DETECTED
G LAMBLIA DNA STL QL NAA+NON-PROBE: NOT DETECTED
GLOBULIN UR ELPH-MCNC: 2.7 GM/DL
GLUCOSE SERPL-MCNC: 97 MG/DL (ref 65–99)
GLUCOSE UR STRIP-MCNC: NEGATIVE MG/DL
GRAN CASTS URNS QL MICRO: ABNORMAL /LPF
HCT VFR BLD AUTO: 34.3 % (ref 34–46.6)
HGB BLD-MCNC: 11.4 G/DL (ref 12–15.9)
HGB UR QL STRIP.AUTO: ABNORMAL
HOLD SPECIMEN: NORMAL
HOLD SPECIMEN: NORMAL
HYALINE CASTS UR QL AUTO: ABNORMAL /LPF
KETONES UR QL STRIP: NEGATIVE
LEUKOCYTE ESTERASE UR QL STRIP.AUTO: ABNORMAL
LIPASE SERPL-CCNC: 16 U/L (ref 13–60)
LYMPHOCYTES # BLD MANUAL: 1.91 10*3/MM3 (ref 0.7–3.1)
LYMPHOCYTES NFR BLD MANUAL: 9.2 % (ref 5–12)
MCH RBC QN AUTO: 30.3 PG (ref 26.6–33)
MCHC RBC AUTO-ENTMCNC: 33.2 G/DL (ref 31.5–35.7)
MCV RBC AUTO: 91.2 FL (ref 79–97)
MONOCYTES # BLD: 4.28 10*3/MM3 (ref 0.1–0.9)
MYELOCYTES NFR BLD MANUAL: 1 % (ref 0–0)
NEUTROPHILS # BLD AUTO: 39.39 10*3/MM3 (ref 1.7–7)
NEUTROPHILS NFR BLD MANUAL: 84.7 % (ref 42.7–76)
NITRITE UR QL STRIP: POSITIVE
NOROVIRUS GI+II RNA STL QL NAA+NON-PROBE: NOT DETECTED
P SHIGELLOIDES DNA STL QL NAA+NON-PROBE: NOT DETECTED
PH UR STRIP.AUTO: 5.5 [PH] (ref 5–8)
PLAT MORPH BLD: NORMAL
PLATELET # BLD AUTO: 276 10*3/MM3 (ref 140–450)
PMV BLD AUTO: 11.6 FL (ref 6–12)
POIKILOCYTOSIS BLD QL SMEAR: ABNORMAL
POTASSIUM SERPL-SCNC: 3.1 MMOL/L (ref 3.5–5.2)
PROT SERPL-MCNC: 6.3 G/DL (ref 6–8.5)
PROT UR QL STRIP: ABNORMAL
RBC # BLD AUTO: 3.76 10*6/MM3 (ref 3.77–5.28)
RBC # UR STRIP: ABNORMAL /HPF
REF LAB TEST METHOD: ABNORMAL
RVA RNA STL QL NAA+NON-PROBE: DETECTED
S ENT+BONG DNA STL QL NAA+NON-PROBE: NOT DETECTED
SAPO I+II+IV+V RNA STL QL NAA+NON-PROBE: NOT DETECTED
SCHISTOCYTES BLD QL SMEAR: ABNORMAL
SHIGELLA SP+EIEC IPAH ST NAA+NON-PROBE: NOT DETECTED
SODIUM SERPL-SCNC: 135 MMOL/L (ref 136–145)
SP GR UR STRIP: 1.02 (ref 1–1.03)
SQUAMOUS #/AREA URNS HPF: ABNORMAL /HPF
UROBILINOGEN UR QL STRIP: ABNORMAL
V CHOL+PARA+VUL DNA STL QL NAA+NON-PROBE: NOT DETECTED
V CHOLERAE DNA STL QL NAA+NON-PROBE: NOT DETECTED
VARIANT LYMPHS NFR BLD MANUAL: 4.1 % (ref 19.6–45.3)
WBC # UR STRIP: ABNORMAL /HPF
WBC MORPH BLD: NORMAL
WBC NRBC COR # BLD: 46.5 10*3/MM3 (ref 3.4–10.8)
WHOLE BLOOD HOLD COAG: NORMAL
WHOLE BLOOD HOLD SPECIMEN: NORMAL
Y ENTEROCOL DNA STL QL NAA+NON-PROBE: NOT DETECTED

## 2023-05-03 PROCEDURE — 25010000002 ONDANSETRON PER 1 MG: Performed by: EMERGENCY MEDICINE

## 2023-05-03 PROCEDURE — 99285 EMERGENCY DEPT VISIT HI MDM: CPT

## 2023-05-03 PROCEDURE — 83690 ASSAY OF LIPASE: CPT | Performed by: EMERGENCY MEDICINE

## 2023-05-03 PROCEDURE — G0378 HOSPITAL OBSERVATION PER HR: HCPCS

## 2023-05-03 PROCEDURE — 87507 IADNA-DNA/RNA PROBE TQ 12-25: CPT | Performed by: EMERGENCY MEDICINE

## 2023-05-03 PROCEDURE — 25010000002 SODIUM CHLORIDE 0.9 % WITH KCL 20 MEQ 20-0.9 MEQ/L-% SOLUTION: Performed by: INTERNAL MEDICINE

## 2023-05-03 PROCEDURE — 80053 COMPREHEN METABOLIC PANEL: CPT | Performed by: EMERGENCY MEDICINE

## 2023-05-03 PROCEDURE — 87493 C DIFF AMPLIFIED PROBE: CPT | Performed by: EMERGENCY MEDICINE

## 2023-05-03 PROCEDURE — 83605 ASSAY OF LACTIC ACID: CPT | Performed by: EMERGENCY MEDICINE

## 2023-05-03 PROCEDURE — 85007 BL SMEAR W/DIFF WBC COUNT: CPT | Performed by: EMERGENCY MEDICINE

## 2023-05-03 PROCEDURE — 85025 COMPLETE CBC W/AUTO DIFF WBC: CPT | Performed by: EMERGENCY MEDICINE

## 2023-05-03 PROCEDURE — 87449 NOS EACH ORGANISM AG IA: CPT | Performed by: EMERGENCY MEDICINE

## 2023-05-03 PROCEDURE — 81001 URINALYSIS AUTO W/SCOPE: CPT | Performed by: EMERGENCY MEDICINE

## 2023-05-03 RX ORDER — ACETAMINOPHEN 325 MG/1
650 TABLET ORAL EVERY 4 HOURS PRN
Status: DISCONTINUED | OUTPATIENT
Start: 2023-05-03 | End: 2023-05-16 | Stop reason: HOSPADM

## 2023-05-03 RX ORDER — VANCOMYCIN HYDROCHLORIDE 125 MG/1
125 CAPSULE ORAL EVERY 6 HOURS SCHEDULED
Status: DISCONTINUED | OUTPATIENT
Start: 2023-05-04 | End: 2023-05-05

## 2023-05-03 RX ORDER — CLOPIDOGREL BISULFATE 75 MG/1
75 TABLET ORAL DAILY
Status: DISCONTINUED | OUTPATIENT
Start: 2023-05-04 | End: 2023-05-16 | Stop reason: HOSPADM

## 2023-05-03 RX ORDER — LATANOPROST 50 UG/ML
1 SOLUTION/ DROPS OPHTHALMIC NIGHTLY
Status: DISCONTINUED | OUTPATIENT
Start: 2023-05-03 | End: 2023-05-16 | Stop reason: HOSPADM

## 2023-05-03 RX ORDER — METOPROLOL SUCCINATE 25 MG/1
25 TABLET, EXTENDED RELEASE ORAL 2 TIMES DAILY
Status: DISCONTINUED | OUTPATIENT
Start: 2023-05-03 | End: 2023-05-16 | Stop reason: HOSPADM

## 2023-05-03 RX ORDER — LISINOPRIL 2.5 MG/1
2.5 TABLET ORAL DAILY
COMMUNITY

## 2023-05-03 RX ORDER — ONDANSETRON 4 MG/1
4 TABLET, FILM COATED ORAL EVERY 6 HOURS PRN
Status: DISCONTINUED | OUTPATIENT
Start: 2023-05-03 | End: 2023-05-16 | Stop reason: HOSPADM

## 2023-05-03 RX ORDER — ATORVASTATIN CALCIUM 80 MG/1
80 TABLET, FILM COATED ORAL NIGHTLY
Status: DISCONTINUED | OUTPATIENT
Start: 2023-05-03 | End: 2023-05-16 | Stop reason: HOSPADM

## 2023-05-03 RX ORDER — FAMOTIDINE 20 MG/1
20 TABLET, FILM COATED ORAL DAILY
Status: DISCONTINUED | OUTPATIENT
Start: 2023-05-04 | End: 2023-05-16 | Stop reason: HOSPADM

## 2023-05-03 RX ORDER — ONDANSETRON 2 MG/ML
4 INJECTION INTRAMUSCULAR; INTRAVENOUS ONCE
Status: COMPLETED | OUTPATIENT
Start: 2023-05-03 | End: 2023-05-03

## 2023-05-03 RX ORDER — PANTOPRAZOLE SODIUM 40 MG/1
40 TABLET, DELAYED RELEASE ORAL
Status: DISCONTINUED | OUTPATIENT
Start: 2023-05-04 | End: 2023-05-03 | Stop reason: ALTCHOICE

## 2023-05-03 RX ORDER — SODIUM CHLORIDE 0.9 % (FLUSH) 0.9 %
10 SYRINGE (ML) INJECTION AS NEEDED
Status: DISCONTINUED | OUTPATIENT
Start: 2023-05-03 | End: 2023-05-16 | Stop reason: HOSPADM

## 2023-05-03 RX ORDER — SUCRALFATE 1 G/1
1 TABLET ORAL 4 TIMES DAILY
COMMUNITY
End: 2023-08-02

## 2023-05-03 RX ORDER — POTASSIUM CHLORIDE 750 MG/1
40 TABLET, FILM COATED, EXTENDED RELEASE ORAL ONCE
Status: COMPLETED | OUTPATIENT
Start: 2023-05-03 | End: 2023-05-03

## 2023-05-03 RX ORDER — SERTRALINE HYDROCHLORIDE 100 MG/1
100 TABLET, FILM COATED ORAL DAILY
Status: DISCONTINUED | OUTPATIENT
Start: 2023-05-04 | End: 2023-05-16 | Stop reason: HOSPADM

## 2023-05-03 RX ORDER — UREA 10 %
3 LOTION (ML) TOPICAL NIGHTLY PRN
Status: DISCONTINUED | OUTPATIENT
Start: 2023-05-03 | End: 2023-05-16 | Stop reason: HOSPADM

## 2023-05-03 RX ORDER — PANTOPRAZOLE SODIUM 40 MG/1
40 TABLET, DELAYED RELEASE ORAL DAILY
Status: DISCONTINUED | OUTPATIENT
Start: 2023-05-04 | End: 2023-05-03 | Stop reason: SDUPTHER

## 2023-05-03 RX ORDER — LISINOPRIL 2.5 MG/1
2.5 TABLET ORAL DAILY
Status: DISCONTINUED | OUTPATIENT
Start: 2023-05-04 | End: 2023-05-04

## 2023-05-03 RX ORDER — ONDANSETRON 2 MG/ML
4 INJECTION INTRAMUSCULAR; INTRAVENOUS EVERY 6 HOURS PRN
Status: DISCONTINUED | OUTPATIENT
Start: 2023-05-03 | End: 2023-05-16 | Stop reason: HOSPADM

## 2023-05-03 RX ORDER — SODIUM CHLORIDE AND POTASSIUM CHLORIDE 150; 900 MG/100ML; MG/100ML
100 INJECTION, SOLUTION INTRAVENOUS CONTINUOUS
Status: DISCONTINUED | OUTPATIENT
Start: 2023-05-03 | End: 2023-05-05

## 2023-05-03 RX ORDER — ESOMEPRAZOLE MAGNESIUM 40 MG/1
40 CAPSULE, DELAYED RELEASE ORAL
COMMUNITY
End: 2023-05-16 | Stop reason: HOSPADM

## 2023-05-03 RX ADMIN — POTASSIUM CHLORIDE AND SODIUM CHLORIDE 100 ML/HR: 900; 150 INJECTION, SOLUTION INTRAVENOUS at 23:02

## 2023-05-03 RX ADMIN — POTASSIUM CHLORIDE 40 MEQ: 750 TABLET, EXTENDED RELEASE ORAL at 16:23

## 2023-05-03 RX ADMIN — APIXABAN 2.5 MG: 2.5 TABLET, FILM COATED ORAL at 22:26

## 2023-05-03 RX ADMIN — Medication 10 ML: at 20:11

## 2023-05-03 RX ADMIN — ATORVASTATIN CALCIUM 80 MG: 80 TABLET, FILM COATED ORAL at 22:26

## 2023-05-03 RX ADMIN — LATANOPROST 1 DROP: 50 SOLUTION OPHTHALMIC at 22:25

## 2023-05-03 RX ADMIN — ONDANSETRON 4 MG: 2 INJECTION INTRAMUSCULAR; INTRAVENOUS at 16:21

## 2023-05-03 RX ADMIN — SODIUM CHLORIDE 500 ML: 9 INJECTION, SOLUTION INTRAVENOUS at 16:19

## 2023-05-03 RX ADMIN — Medication 10 ML: at 15:34

## 2023-05-03 NOTE — TELEPHONE ENCOUNTER
"Spoke to pts PCP who stated she has this pt in office.  She is down to 118 from 145 in the last 2 weeks.  Neema stated patient is having diarrhea so bad that when she stands up it runs out of her.  She is passing nothing but \"brown water.\"     Neema is stopping her diuretic, advising imodium after each loose stool.  Neema also feels this pt is doing so poorly that she should be direct admitted to the hospital and get her worked up.  Pt and family are agreeable to the liver biopsy.    Please advise    "

## 2023-05-03 NOTE — ED NOTES
Patient from assisted living, she had her gallbladder taken out 2 months ago, she states she has not felt well since then. SHe has not been eating, and having diarrhea for the last 4 days with stomach pains. She has lost 20 pounds in a month also.

## 2023-05-03 NOTE — ED NOTES
Pt sats dropped to 88% while resting in bed, states she does wear up to 3L nc at night PRN. Placed on 3L nc at this time

## 2023-05-03 NOTE — PROGRESS NOTES
Clinical Pharmacy Services: Medication History    Catherine Boyer is a 83 y.o. female presenting to [facility] for   Chief Complaint   Patient presents with   • Abdominal Pain   • Nausea   • Vomiting       She  has a past medical history of Atherosclerosis of abdominal aorta (2/5/2023), Atrial fibrillation, Breast cancer, CAD (coronary artery disease), Carotid atherosclerosis, Chronic diastolic (congestive) heart failure, COVID (10/29/2022), GERD (gastroesophageal reflux disease), Glaucoma, History of cataract, Hypertension, Microcytic anemia, Myeloproliferative disorder, Nonbacterial thrombotic endocarditis, Porcelain gallbladder, PUD (peptic ulcer disease), TIA (transient ischemic attack), Type 2 diabetes mellitus, Type 2 diabetes mellitus with circulatory disorder, without long-term current use of insulin (7/14/2022), and Upper GI bleed.    Allergies as of 05/03/2023 - Reviewed 05/03/2023   Allergen Reaction Noted   • Aspirin Unknown - Low Severity 05/26/2022   • Oxycodone Unknown - Low Severity 09/04/2014   • Hydroxyurea Other (See Comments) 02/06/2023   • Diphenhydramine hcl Anxiety and Unknown (See Comments) 06/29/2021       Medication information was obtained from: Pharmacy  Pharmacy and Phone Number:   [facility] - [place], [place] - [address] [place] [address] - [phone] [facility] [phone] [address] [address]  Phone: [phone] Fax: [phone]    [id number] - [place], [place] - [address] [facility] [phone] [facility] [phone]   [address] [facility] [address]  Phone: [phone] Fax: [phone]        Prior to Admission Medications     Prescriptions Last Dose Informant Patient Reported? Taking?    acetaminophen (TYLENOL) 500 MG tablet  Self Yes Yes    Take 1 tablet by mouth As Needed.    apixaban (ELIQUIS) 5 MG tablet tablet  Pharmacy No Yes    Take 1 tablet by mouth 2 (Two) Times a Day.    atorvastatin (LIPITOR)  80 MG tablet  Pharmacy No Yes    Take 1 tablet by mouth Every Night.    clopidogrel (PLAVIX) 75 MG tablet  Pharmacy No Yes    TAKE 1 TABLET DAILY    Patient taking differently:  Take 1 tablet by mouth Daily.    empagliflozin (JARDIANCE) 10 MG tablet tablet  Pharmacy No Yes    Take 1 tablet by mouth Daily.    esomeprazole (nexIUM) 40 MG capsule  Pharmacy Yes Yes    Take 1 capsule by mouth Every Morning Before Breakfast.    furosemide (LASIX) 40 MG tablet  Pharmacy No Yes    Take 1 tablet by mouth Daily.    Jakafi 15 MG chemo tablet  Pharmacy No Yes    Take 1 tablet by mouth 2 (Two) Times a Day.    latanoprost (XALATAN) 0.005 % ophthalmic solution  Pharmacy Yes Yes    Administer 1 drop to both eyes.    lisinopril (PRINIVIL,ZESTRIL) 2.5 MG tablet  Pharmacy Yes Yes    Take 1 tablet by mouth Daily.    metoprolol succinate XL (TOPROL-XL) 25 MG 24 hr tablet  Pharmacy No Yes    Take 1 tablet by mouth 2 (Two) Times a Day.    pantoprazole (PROTONIX) 40 MG EC tablet  Pharmacy No Yes    Take 1 tablet by mouth Daily.    sertraline (ZOLOFT) 100 MG tablet  Pharmacy Yes Yes    Take 1 tablet by mouth Daily.    sucralfate (CARAFATE) 1 g tablet  Pharmacy Yes Yes    Take 1 tablet by mouth 4 (Four) Times a Day.    ondansetron (Zofran) 4 MG tablet   No No    Take 1 tablet by mouth Every 8 (Eight) Hours As Needed for Nausea or Vomiting.    simethicone (MYLICON) 80 MG chewable tablet   No No    Chew 1 tablet by mouth 4 (Four) Times a Day As Needed for Flatulence.    traZODone (DESYREL) 50 MG tablet   Yes No    Take 25-50 mg by mouth every night at bedtime.            Medication notes:     This medication list is complete to the best of my knowledge as of 5/3/2023    Please call if questions.    Eric Baldwin  Medication History Technician  [phone]    5/3/2023 16:06 EDT

## 2023-05-03 NOTE — ED PROVIDER NOTES
EMERGENCY DEPARTMENT ENCOUNTER    Room Number:  20/20  Date of encounter:  5/3/2023  PCP: Kristi Moreau APRN  Patient Care Team:  Kristi Moreau APRN as PCP - General (Nurse Practitioner)  Gerard Foreman MD as Referring Physician (Medical Oncology)  Darrell Reinoso MD as Consulting Physician (Hematology and Oncology)  Albin Barriga MD as Cardiologist (Cardiology)  Richard Aldana, RN as    Independent Historians: Patient, family, EMS    HPI:  Chief Complaint: Nausea, vomiting, diarrhea, weight loss    A complete HPI/ROS/PMH/PSH/SH/FH are unobtainable due to: Nothing    Chronic or social conditions impacting patient care (Social Determinants of Health): None  (Financial Resource Strain / Food Insecurity / Transportation Needs / Physical Activity / Stress / Social Connections / Intimate Partner Violence / Housing Stability)    Context: Catherine Boyer is a 83 y.o. female who presents to the ED c/o acute nausea, vomiting, diarrhea and weight loss.  She reports that she had a gallbladder removed about 2 months ago.  She reports that since then she has had poor appetite.  She reports for the last 5 days she has had nausea vomiting and diarrhea.  She states she has lost about 30 pounds in the last 30 days.  She states that her primary care doctor came to her assisted living location today and directed her to the emergency room for further evaluation.  She has not take any medicine for her symptoms.    Review of prior external notes (non-ED) -and- Review of prior external test results outside of this encounter: Gastroenterology note dated today recommending admission for her current symptoms.  Oncology note dated 4/10/2023 with myelofibrosis on Jakafi.  Plan to monitor liver labs.  GI requested liver biopsy.    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   • Paroxysmal atrial fibrillation 07/14/2022   • Myeloproliferative disorder 07/14/2022   • Microcytic anemia 07/14/2022   • Type 2 diabetes mellitus with circulatory disorder, without long-term current use of insulin 07/14/2022   • GERD (gastroesophageal reflux disease)    • Hypertension    • Personal history of transient ischemic attack (TIA), 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 deficits 09/04/2022   • Porcelain gallbladder    • Breast cancer    • Chronic diastolic (congestive) heart failure    • Cholecystitis 11/22/2022   • Urinary tract infection without hematuria, site unspecified 01/23/2023   • Epigastric abdominal pain 01/24/2023   • Gastritis 01/26/2023   • Atherosclerosis of abdominal aorta 02/05/2023   • APC (atrial premature contractions) 02/24/2023     Resolved Ambulatory Problems     Diagnosis Date Noted   • Acute on chronic diastolic heart failure 07/14/2022   • Chest pain with high risk for cardiac etiology 08/31/2022   • COVID 10/29/2022   • Acute UTI (urinary tract infection) 11/21/2022   • ABLA (acute blood loss anemia) 02/04/2023   • Hypokalemia 02/07/2023     Past Medical History:   Diagnosis Date   • Atrial fibrillation    • Carotid atherosclerosis    • Glaucoma    • History of cataract    • PUD (peptic ulcer disease)    • TIA (transient ischemic attack)    • Type 2 diabetes mellitus    • Upper GI bleed          PAST SURGICAL HISTORY  Past Surgical History:   Procedure Laterality Date   • BREAST LUMPECTOMY  1999   • CARDIAC CATHETERIZATION N/A 09/02/2022    Procedure: Coronary angiography;  Surgeon: Guero Verde MD;  Location:  [facility];  Service: Cardiovascular;  Laterality: N/A;   • CARDIAC CATHETERIZATION N/A 09/02/2022    Procedure: Stent LEFYT coronary;  Surgeon: Guero Verde MD;  Location:  [facility];  Service: Cardiovascular;  Laterality: N/A;   • CATARACT EXTRACTION  2011   • CHOLECYSTECTOMY     •  "CHOLECYSTECTOMY WITH INTRAOPERATIVE CHOLANGIOGRAM N/A 11/28/2022    Procedure: CHOLECYSTECTOMY LAPAROSCOPIC INTRAOPERATIVE CHOLANGIOGRAM;  Surgeon: Dani Grover Jr., MD;  Location: [facility] MAIN OR;  Service: General;  Laterality: N/A;   • COLONOSCOPY N/A 02/09/2023    Procedure: COLONOSCOPY TO CECUM & T.I.;  Surgeon: Guero Ferris MD;  Location: [facility] ENDOSCOPY;  Service: Gastroenterology;  Laterality: N/A;  PRE- MELENA, GI BLEED  POST- DIVERTICULOSIS, INT HEMORRHOIDS   • ENDOSCOPY N/A 01/25/2023    Procedure: ESOPHAGOGASTRODUODENOSCOPY WITH BIOPSIES;  Surgeon: Charles Diaz MD;  Location: [facility] ENDOSCOPY;  Service: Gastroenterology;  Laterality: N/A;  pre: abd pain, nausea  post: hiatal hernia, mild gastritis, sloughing of the esophagus   • ENTEROSCOPY SMALL BOWEL N/A 02/09/2023    Procedure: ENTEROSCOPY SMALL BOWEL;  Surgeon: Guero Ferris MD;  Location: [facility] ENDOSCOPY;  Service: Gastroenterology;  Laterality: N/A;  PRE- MELENA, GI BLEED  POST- HIATAL HERNIA   • JOINT REPLACEMENT     • UPPER GASTROINTESTINAL ENDOSCOPY           FAMILY HISTORY  Family History   Problem Relation Age of Onset   • Ovarian cancer Mother    • Lung cancer Father    • Lung cancer Sister    • Malig Hyperthermia Neg Hx          SOCIAL HISTORY  Social History     Socioeconomic History   • Marital status:    Tobacco Use   • Smoking status: Former     Packs/day: 1.00     Years: 20.00     Pack years: 20.00     Types: Cigarettes   • Smokeless tobacco: Never   • Tobacco comments:     30  years ago   Vaping Use   • Vaping Use: Never used   Substance and Sexual Activity   • Alcohol use: Yes     Comment: \"rare\"   • Drug use: Never   • Sexual activity: Defer         ALLERGIES  Aspirin, Oxycodone, Hydroxyurea, and Diphenhydramine hcl        REVIEW OF SYSTEMS  Review of Systems  Included in HPI  All systems reviewed and negative except for those discussed in HPI.      PHYSICAL EXAM    I have reviewed the triage vital signs and " nursing notes.    ED Triage Vitals [05/03/23 1323]   Temp Heart Rate Resp BP SpO2   98 °F (36.7 °C) 74 16 102/58 94 %      Temp src Heart Rate Source Patient Position BP Location FiO2 (%)   -- -- -- -- --       Physical Exam  GENERAL: Awake, alert, no acute distress  SKIN: Warm, dry  HENT: Normocephalic, atraumatic  EYES: no scleral icterus  CV: regular rhythm, regular rate  RESPIRATORY: normal effort, lungs clear  ABDOMEN: soft, nontender, nondistended  MUSCULOSKELETAL: no deformity.  Ulcerated skin lesion to her left leg  NEURO: alert, moves all extremities, follows commands                                                               LAB RESULTS  Recent Results (from the past 24 hour(s))   Comprehensive Metabolic Panel    Collection Time: 05/03/23  3:34 PM    Specimen: Blood   Result Value Ref Range    Glucose 97 65 - 99 mg/dL    BUN 43 (H) 8 - 23 mg/dL    Creatinine 1.80 (H) 0.57 - 1.00 mg/dL    Sodium 135 (L) 136 - 145 mmol/L    Potassium 3.1 (L) 3.5 - 5.2 mmol/L    Chloride 100 98 - 107 mmol/L    CO2 20.0 (L) 22.0 - 29.0 mmol/L    Calcium 8.3 (L) 8.6 - 10.5 mg/dL    Total Protein 6.3 6.0 - 8.5 g/dL    Albumin 3.6 3.5 - 5.2 g/dL    ALT (SGPT) 25 1 - 33 U/L    AST (SGOT) 19 1 - 32 U/L    Alkaline Phosphatase 282 (H) 39 - 117 U/L    Total Bilirubin 0.5 0.0 - 1.2 mg/dL    Globulin 2.7 gm/dL    A/G Ratio 1.3 g/dL    BUN/Creatinine Ratio 23.9 7.0 - 25.0    Anion Gap 15.0 5.0 - 15.0 mmol/L    eGFR 27.3 (L) >60.0 mL/min/1.73   Lipase    Collection Time: 05/03/23  3:34 PM    Specimen: Blood   Result Value Ref Range    Lipase 16 13 - 60 U/L   Lactic Acid, Plasma    Collection Time: 05/03/23  3:34 PM    Specimen: Blood   Result Value Ref Range    Lactate 1.8 0.5 - 2.0 mmol/L   Green Top (Gel)    Collection Time: 05/03/23  3:34 PM   Result Value Ref Range    Extra Tube Hold for add-ons.    Lavender Top    Collection Time: 05/03/23  3:34 PM   Result Value Ref Range    Extra Tube hold for add-on    Gold Top - SST     Collection Time: 05/03/23  3:34 PM   Result Value Ref Range    Extra Tube Hold for add-ons.    Light Blue Top    Collection Time: 05/03/23  3:34 PM   Result Value Ref Range    Extra Tube Hold for add-ons.    CBC Auto Differential    Collection Time: 05/03/23  3:34 PM    Specimen: Blood   Result Value Ref Range    WBC 46.50 (C) 3.40 - 10.80 10*3/mm3    RBC 3.76 (L) 3.77 - 5.28 10*6/mm3    Hemoglobin 11.4 (L) 12.0 - 15.9 g/dL    Hematocrit 34.3 34.0 - 46.6 %    MCV 91.2 79.0 - 97.0 fL    MCH 30.3 26.6 - 33.0 pg    MCHC 33.2 31.5 - 35.7 g/dL    RDW 16.7 (H) 12.3 - 15.4 %    RDW-SD 54.9 (H) 37.0 - 54.0 fl    MPV 11.6 6.0 - 12.0 fL    Platelets 276 140 - 450 10*3/mm3   Manual Differential    Collection Time: 05/03/23  3:34 PM    Specimen: Blood   Result Value Ref Range    Neutrophil % 84.7 (H) 42.7 - 76.0 %    Lymphocyte % 4.1 (L) 19.6 - 45.3 %    Monocyte % 9.2 5.0 - 12.0 %    Basophil % 1.0 0.0 - 1.5 %    Myelocyte % 1.0 (H) 0.0 - 0.0 %    Neutrophils Absolute 39.39 (H) 1.70 - 7.00 10*3/mm3    Lymphocytes Absolute 1.91 0.70 - 3.10 10*3/mm3    Monocytes Absolute 4.28 (H) 0.10 - 0.90 10*3/mm3    Basophils Absolute 0.47 (H) 0.00 - 0.20 10*3/mm3    Crenated RBC's Slight/1+ None Seen    Poikilocytes Slight/1+ None Seen    Schistocytes Slight/1+ None Seen    WBC Morphology Normal Normal    Platelet Morphology Normal Normal       Ordered the above labs and independently reviewed the results.        RADIOLOGY  No Radiology Exams Resulted Within Past 24 Hours    I ordered the above noted radiological studies. Reviewed by me and discussed with radiologist.  See dictation for official radiology interpretation.      PROCEDURES    Procedures      MEDICATIONS GIVEN IN ER    Medications   sodium chloride 0.9 % flush 10 mL (10 mL Intravenous Given 5/3/23 1534)   sodium chloride 0.9 % bolus 500 mL (500 mL Intravenous New Bag 5/3/23 1619)   potassium chloride (K-DUR,KLOR-CON) ER tablet 40 mEq (40 mEq Oral Given 5/3/23 1623)    ondansetron (ZOFRAN) injection 4 mg (4 mg Intravenous Given 5/3/23 1621)         ORDERS PLACED DURING THIS VISIT:  Orders Placed This Encounter   Procedures   • Clostridioides difficile Toxin - Stool, Per Rectum   • Gastrointestinal Panel, PCR - Stool, Per Rectum   • Clostridioides difficile Toxin, PCR - Stool, Per Rectum   • [place] Draw   • Comprehensive Metabolic Panel   • Lipase   • Urinalysis With Microscopic If Indicated (No Culture) - Urine, Clean Catch   • Lactic Acid, Plasma   • CBC Auto Differential   • Manual Differential   • NPO Diet NPO Type: Strict NPO   • Undress & Gown   • Cardiac Monitoring   • [facility] (on-call MD unless specified) Details   • Patient Isolation Contact Spore   • Insert Peripheral IV   • Initiate Observation Status   • CBC & Differential   • Green Top (Gel)   • Lavender Top   • Gold Top - SST   • Light Blue Top         PROGRESS, DATA ANALYSIS, CONSULTS, AND MEDICAL DECISION MAKING    All labs have been independently interpreted by me.  All radiology studies have been reviewed by me and discussed with radiologist dictating the report.   EKG's independently viewed and interpreted by me.  Discussion below represents my analysis of pertinent findings related to patient's condition, differential diagnosis, treatment plan and final disposition.    Differential diagnosis includes but is not limited to dehydration, renal failure, liver failure, intra-abdominal infection, sepsis.    ED Course as of 05/03/23 1712   Wed May 03, 2023   1610 The patient's laboratory evaluation shows acute renal failure with some hypokalemia.  I have ordered to replace potassium.  I am giving some IV fluids for the renal failure.  She has a chronic leukocytosis.  I have a call out to get her admitted. [TR]   1701 Discussing with Dr. Macias with [facility].  Agrees to admit. [TR]   1711 I reviewed the work-up and findings with the patient and family at bedside.  Answered all questions.  Plan admission.  They are agreeable.  [TR]      ED Course User Index  [TR] Tommy Clemente MD       I interpreted the cardiac monitor rhythm and my independent interpretation is: normal sinus rhythm.         AS OF 17:12 EDT VITALS:    BP - 103/56  HR - 76  TEMP - 98 °F (36.7 °C)  O2 SATS - 95%        DIAGNOSIS  Final diagnoses:   Acute renal failure, unspecified acute renal failure type   Hypokalemia   Nausea vomiting and diarrhea   Weight loss         DISPOSITION  ED Disposition     ED Disposition   Decision to Admit    Condition   --    Comment   Level of Care: Telemetry [5]   Diagnosis: Acute renal failure, unspecified acute renal failure type [[id number]]   Admitting Physician: ADEN BRAVO [[id number]]   Attending Physician: ADEN BRAVO [[id number]]                  Note Disclaimer: At [facility], we believe that sharing information builds trust and better relationships. You are receiving this note because you recently visited [facility]. It is possible you will see health information before a provider has talked with you about it. This kind of information can be easy to misunderstand. To help you fully understand what it means for your health, we urge you to discuss this note with your provider.       Tomym Clemente MD  05/03/23 1700       Tommy Clemente MD  05/03/23 1711

## 2023-05-03 NOTE — TELEPHONE ENCOUNTER
rec contact hospitalist for admission given her condition/symptoms.  [facility] bx can be deferred until after acute issues are addressed - hematology feels like abnormal labs may be related to jakafi and therefore bx may not be necessary

## 2023-05-03 NOTE — ED NOTES
Pt [facility] from [facility] for diarrhea a number of times/day, nausea, abd cramping since having gallbladder removed. States also has had poor appetite and some weight loss. Pt is a/o x 4. Brother at bedside, no knowledge of recently being on abx per pt or family

## 2023-05-03 NOTE — ED NOTES
"Nursing report ED to floor  Catherine Boyer  83 y.o.  female    HPI :   Chief Complaint   Patient presents with    Abdominal Pain    Nausea    Vomiting       Admitting doctor:   Jaimee Macias MD    Admitting diagnosis:   The primary encounter diagnosis was Acute renal failure, unspecified acute renal failure type. Diagnoses of Hypokalemia, Nausea vomiting and diarrhea, and Weight loss were also pertinent to this visit.    Code status:   Current Code Status       Date Active Code Status Order ID Comments User Context       Prior            Allergies:   Aspirin, Oxycodone, Hydroxyurea, and Diphenhydramine hcl    Isolation:   Contact Spore    Intake and Output  No intake or output data in the 24 hours ending 05/03/23 1717    Weight:       05/03/23  1540   Weight: 53.5 kg (118 lb)       Most recent vitals:   Vitals:    05/03/23 1323 05/03/23 1540 05/03/23 1622 05/03/23 1632   BP: 102/58  103/56    Pulse: 74  76    Resp: 16  16 16   Temp: 98 °F (36.7 °C)      SpO2: 94%  93% 95%   Weight:  53.5 kg (118 lb)     Height:  154.9 cm (61\")         Active LDAs/IV Access:   Lines, Drains & Airways       Active LDAs       Name Placement date Placement time Site Days    Peripheral IV 05/03/23 1534 Right Antecubital 05/03/23  1534  Antecubital  less than 1                    Labs (abnormal labs have a star):   Labs Reviewed   COMPREHENSIVE METABOLIC PANEL - Abnormal; Notable for the following components:       Result Value    BUN 43 (*)     Creatinine 1.80 (*)     Sodium 135 (*)     Potassium 3.1 (*)     CO2 20.0 (*)     Calcium 8.3 (*)     Alkaline Phosphatase 282 (*)     eGFR 27.3 (*)     All other components within normal limits    Narrative:     GFR Normal >60  Chronic Kidney Disease <60  Kidney Failure <15    The GFR formula is only valid for adults with stable renal function between ages 18 and 70.   CBC WITH AUTO DIFFERENTIAL - Abnormal; Notable for the following components:    WBC 46.50 (*)     RBC 3.76 (*)     " Hemoglobin 11.4 (*)     RDW 16.7 (*)     RDW-SD 54.9 (*)     All other components within normal limits   MANUAL DIFFERENTIAL - Abnormal; Notable for the following components:    Neutrophil % 84.7 (*)     Lymphocyte % 4.1 (*)     Myelocyte % 1.0 (*)     Neutrophils Absolute 39.39 (*)     Monocytes Absolute 4.28 (*)     Basophils Absolute 0.47 (*)     All other components within normal limits   LIPASE - Normal   LACTIC ACID, PLASMA - Normal   CLOSTRIDIOIDES DIFFICILE TOXIN    Narrative:     The following orders were created for panel order Clostridioides difficile Toxin - Stool, Per Rectum.  Procedure                               Abnormality         Status                     ---------                               -----------         ------                     Clostridioides difficile...[[id number]]                                                   Please view results for these tests on the individual orders.   GASTROINTESTINAL PANEL, PCR   CLOSTRIDIOIDES DIFFICILE TOXIN, PCR   RAINBOW DRAW    Narrative:     The following orders were created for panel order [place] Draw.  Procedure                               Abnormality         Status                     ---------                               -----------         ------                     Green Top (Gel)[[id number]]                                  Final result               Lavender Top[[id number]]                                     Final result               Gold Top - SST[[id number]]                                   Final result               Light Blue Top[[id number]]                                   Final result                 Please view results for these tests on the individual orders.   URINALYSIS W/ MICROSCOPIC IF INDICATED (NO CULTURE)   CBC AND DIFFERENTIAL    Narrative:     The following orders were created for panel order CBC & Differential.  Procedure                               Abnormality         Status                     ---------               "                 -----------         ------                     CBC Auto Differential[[id number]]        Abnormal            Final result                 Please view results for these tests on the individual orders.   GREEN TOP   LAVENDER TOP   GOLD TOP - SST   LIGHT BLUE TOP       EKG:   No orders to display       Meds given in ED:   Medications   sodium chloride 0.9 % flush 10 mL (10 mL Intravenous Given 5/3/23 1534)   sodium chloride 0.9 % bolus 500 mL (500 mL Intravenous New Bag 5/3/23 1619)   potassium chloride (K-DUR,KLOR-CON) ER tablet 40 mEq (40 mEq Oral Given 5/3/23 1623)   ondansetron (ZOFRAN) injection 4 mg (4 mg Intravenous Given 5/3/23 1621)       Imaging results:  No radiology results for the last day    Ambulatory status:   - bedrest     Social issues:   Social History     Socioeconomic History    Marital status:    Tobacco Use    Smoking status: Former     Packs/day: 1.00     Years: 20.00     Pack years: 20.00     Types: Cigarettes    Smokeless tobacco: Never    Tobacco comments:     30  years ago   Vaping Use    Vaping Use: Never used   Substance and Sexual Activity    Alcohol use: Yes     Comment: \"rare\"    Drug use: Never    Sexual activity: Defer       NIH Stroke Scale:         Melanie Cole RN  05/03/23 17:17 EDT        "

## 2023-05-03 NOTE — TELEPHONE ENCOUNTER
Per conversation with Dr Guzman;  agree that it sounds like she needs to be admitted - rec her PCP contact [facility] for admission.

## 2023-05-03 NOTE — TELEPHONE ENCOUNTER
Kristi Darinel ROMAN requests Dr. Castro opinion on pts current status. She has diarrhea and has gone from 145lbs to 118lbs since 4/10/23, pt is also having difficulty standing. D/W Dr. Reinoso. Per Dr. Reinoso he will see pt tomorrow in the office with labs.     I called Kristi back to let her know. She informed me they are sending the pt to the ED. We will not schedule for tomorrow.

## 2023-05-03 NOTE — TELEPHONE ENCOUNTER
JORGE SUGGS, THE PTS DR WANTS YOU TO CALL HER ASAP ABOUT THIS PT.  THE NUMBER IS HER CELL [phone].

## 2023-05-04 ENCOUNTER — APPOINTMENT (OUTPATIENT)
Dept: CT IMAGING | Facility: HOSPITAL | Age: 83
End: 2023-05-04
Payer: MEDICARE

## 2023-05-04 PROBLEM — A08.0 ROTAVIRUS ENTERITIS: Status: ACTIVE | Noted: 2023-05-04

## 2023-05-04 PROBLEM — N17.9 ACUTE RENAL FAILURE, UNSPECIFIED ACUTE RENAL FAILURE TYPE: Status: ACTIVE | Noted: 2023-05-04

## 2023-05-04 PROBLEM — N17.9 AKI (ACUTE KIDNEY INJURY): Status: ACTIVE | Noted: 2023-05-04

## 2023-05-04 LAB
ANION GAP SERPL CALCULATED.3IONS-SCNC: 13.5 MMOL/L (ref 5–15)
BUN SERPL-MCNC: 42 MG/DL (ref 8–23)
BUN/CREAT SERPL: 29.6 (ref 7–25)
CALCIUM SPEC-SCNC: 8 MG/DL (ref 8.6–10.5)
CHLORIDE SERPL-SCNC: 107 MMOL/L (ref 98–107)
CO2 SERPL-SCNC: 17.5 MMOL/L (ref 22–29)
CREAT SERPL-MCNC: 1.42 MG/DL (ref 0.57–1)
DEPRECATED RDW RBC AUTO: 55.7 FL (ref 37–54)
EGFRCR SERPLBLD CKD-EPI 2021: 36.3 ML/MIN/1.73
ERYTHROCYTE [DISTWIDTH] IN BLOOD BY AUTOMATED COUNT: 16.6 % (ref 12.3–15.4)
GLUCOSE BLDC GLUCOMTR-MCNC: 111 MG/DL (ref 70–130)
GLUCOSE SERPL-MCNC: 77 MG/DL (ref 65–99)
HCT VFR BLD AUTO: 32.7 % (ref 34–46.6)
HGB BLD-MCNC: 10.7 G/DL (ref 12–15.9)
MAGNESIUM SERPL-MCNC: 1.7 MG/DL (ref 1.6–2.4)
MCH RBC QN AUTO: 30.2 PG (ref 26.6–33)
MCHC RBC AUTO-ENTMCNC: 32.7 G/DL (ref 31.5–35.7)
MCV RBC AUTO: 92.4 FL (ref 79–97)
PLATELET # BLD AUTO: 239 10*3/MM3 (ref 140–450)
PMV BLD AUTO: 11.5 FL (ref 6–12)
POTASSIUM SERPL-SCNC: 3.4 MMOL/L (ref 3.5–5.2)
POTASSIUM SERPL-SCNC: 4.7 MMOL/L (ref 3.5–5.2)
RBC # BLD AUTO: 3.54 10*6/MM3 (ref 3.77–5.28)
SODIUM SERPL-SCNC: 138 MMOL/L (ref 136–145)
WBC NRBC COR # BLD: 36.7 10*3/MM3 (ref 3.4–10.8)

## 2023-05-04 PROCEDURE — XW0DXT5 INTRODUCTION OF RUXOLITINIB INTO MOUTH AND PHARYNX, EXTERNAL APPROACH, NEW TECHNOLOGY GROUP 5: ICD-10-PCS | Performed by: HOSPITALIST

## 2023-05-04 PROCEDURE — 36415 COLL VENOUS BLD VENIPUNCTURE: CPT | Performed by: INTERNAL MEDICINE

## 2023-05-04 PROCEDURE — 82948 REAGENT STRIP/BLOOD GLUCOSE: CPT

## 2023-05-04 PROCEDURE — 80048 BASIC METABOLIC PNL TOTAL CA: CPT | Performed by: INTERNAL MEDICINE

## 2023-05-04 PROCEDURE — 85027 COMPLETE CBC AUTOMATED: CPT | Performed by: INTERNAL MEDICINE

## 2023-05-04 PROCEDURE — C9399 UNCLASSIFIED DRUGS OR BIOLOG: HCPCS | Performed by: INTERNAL MEDICINE

## 2023-05-04 PROCEDURE — 74176 CT ABD & PELVIS W/O CONTRAST: CPT

## 2023-05-04 PROCEDURE — 83735 ASSAY OF MAGNESIUM: CPT | Performed by: INTERNAL MEDICINE

## 2023-05-04 PROCEDURE — 25010000002 SODIUM CHLORIDE 0.9 % WITH KCL 20 MEQ 20-0.9 MEQ/L-% SOLUTION: Performed by: INTERNAL MEDICINE

## 2023-05-04 PROCEDURE — 63710000001 RUXOLITINIB 15 MG TABLET: Performed by: INTERNAL MEDICINE

## 2023-05-04 PROCEDURE — 84132 ASSAY OF SERUM POTASSIUM: CPT | Performed by: INTERNAL MEDICINE

## 2023-05-04 PROCEDURE — 25010000002 ONDANSETRON PER 1 MG: Performed by: INTERNAL MEDICINE

## 2023-05-04 PROCEDURE — 25010000002 PROCHLORPERAZINE 10 MG/2ML SOLUTION: Performed by: INTERNAL MEDICINE

## 2023-05-04 RX ORDER — PROCHLORPERAZINE EDISYLATE 5 MG/ML
10 INJECTION INTRAMUSCULAR; INTRAVENOUS EVERY 6 HOURS PRN
Status: DISCONTINUED | OUTPATIENT
Start: 2023-05-04 | End: 2023-05-16 | Stop reason: HOSPADM

## 2023-05-04 RX ORDER — POTASSIUM CHLORIDE 1.5 G/1.58G
40 POWDER, FOR SOLUTION ORAL AS NEEDED
Status: DISCONTINUED | OUTPATIENT
Start: 2023-05-04 | End: 2023-05-16 | Stop reason: HOSPADM

## 2023-05-04 RX ORDER — POTASSIUM CHLORIDE 750 MG/1
40 TABLET, FILM COATED, EXTENDED RELEASE ORAL AS NEEDED
Status: DISCONTINUED | OUTPATIENT
Start: 2023-05-04 | End: 2023-05-16 | Stop reason: HOSPADM

## 2023-05-04 RX ORDER — PETROLATUM 420 MG/G
1 OINTMENT TOPICAL
Status: DISCONTINUED | OUTPATIENT
Start: 2023-05-04 | End: 2023-05-16 | Stop reason: HOSPADM

## 2023-05-04 RX ORDER — POTASSIUM CHLORIDE 7.45 MG/ML
10 INJECTION INTRAVENOUS
Status: DISCONTINUED | OUTPATIENT
Start: 2023-05-04 | End: 2023-05-16 | Stop reason: HOSPADM

## 2023-05-04 RX ADMIN — CLOPIDOGREL BISULFATE 75 MG: 75 TABLET, FILM COATED ORAL at 12:39

## 2023-05-04 RX ADMIN — VANCOMYCIN HYDROCHLORIDE 125 MG: 125 CAPSULE ORAL at 06:26

## 2023-05-04 RX ADMIN — ONDANSETRON 4 MG: 2 INJECTION INTRAMUSCULAR; INTRAVENOUS at 09:09

## 2023-05-04 RX ADMIN — FAMOTIDINE 20 MG: 20 TABLET, FILM COATED ORAL at 12:38

## 2023-05-04 RX ADMIN — VANCOMYCIN HYDROCHLORIDE 125 MG: 125 CAPSULE ORAL at 18:19

## 2023-05-04 RX ADMIN — ZINC OXIDE 1 APPLICATION: 200 OINTMENT TOPICAL at 20:47

## 2023-05-04 RX ADMIN — ACETAMINOPHEN 650 MG: 325 TABLET, FILM COATED ORAL at 00:20

## 2023-05-04 RX ADMIN — VANCOMYCIN HYDROCHLORIDE 125 MG: 125 CAPSULE ORAL at 12:39

## 2023-05-04 RX ADMIN — ZINC OXIDE 1 APPLICATION: 200 OINTMENT TOPICAL at 17:09

## 2023-05-04 RX ADMIN — POTASSIUM CHLORIDE AND SODIUM CHLORIDE 100 ML/HR: 900; 150 INJECTION, SOLUTION INTRAVENOUS at 09:20

## 2023-05-04 RX ADMIN — POTASSIUM CHLORIDE AND SODIUM CHLORIDE 100 ML/HR: 900; 150 INJECTION, SOLUTION INTRAVENOUS at 22:15

## 2023-05-04 RX ADMIN — METOPROLOL SUCCINATE 25 MG: 25 TABLET, EXTENDED RELEASE ORAL at 12:39

## 2023-05-04 RX ADMIN — APIXABAN 5 MG: 5 TABLET, FILM COATED ORAL at 20:44

## 2023-05-04 RX ADMIN — ONDANSETRON 4 MG: 2 INJECTION INTRAMUSCULAR; INTRAVENOUS at 18:19

## 2023-05-04 RX ADMIN — METOPROLOL SUCCINATE 25 MG: 25 TABLET, EXTENDED RELEASE ORAL at 00:21

## 2023-05-04 RX ADMIN — LATANOPROST 1 DROP: 50 SOLUTION OPHTHALMIC at 20:47

## 2023-05-04 RX ADMIN — POTASSIUM CHLORIDE 40 MEQ: 1.5 POWDER, FOR SOLUTION ORAL at 18:19

## 2023-05-04 RX ADMIN — RUXOLITINIB 15 MG: 15 TABLET ORAL at 20:50

## 2023-05-04 RX ADMIN — Medication 3 MG: at 00:21

## 2023-05-04 RX ADMIN — VANCOMYCIN HYDROCHLORIDE 125 MG: 125 CAPSULE ORAL at 00:21

## 2023-05-04 RX ADMIN — METOPROLOL SUCCINATE 25 MG: 25 TABLET, EXTENDED RELEASE ORAL at 20:43

## 2023-05-04 RX ADMIN — SERTRALINE 100 MG: 100 TABLET, FILM COATED ORAL at 12:39

## 2023-05-04 RX ADMIN — APIXABAN 5 MG: 5 TABLET, FILM COATED ORAL at 12:39

## 2023-05-04 RX ADMIN — POTASSIUM CHLORIDE 40 MEQ: 1.5 POWDER, FOR SOLUTION ORAL at 12:38

## 2023-05-04 RX ADMIN — ACETAMINOPHEN 650 MG: 325 TABLET, FILM COATED ORAL at 18:19

## 2023-05-04 RX ADMIN — PETROLATUM 1 APPLICATION: 420 OINTMENT TOPICAL at 12:46

## 2023-05-04 RX ADMIN — PROCHLORPERAZINE EDISYLATE 10 MG: 5 INJECTION INTRAMUSCULAR; INTRAVENOUS at 12:39

## 2023-05-04 RX ADMIN — ACETAMINOPHEN 650 MG: 325 TABLET, FILM COATED ORAL at 12:38

## 2023-05-04 RX ADMIN — ATORVASTATIN CALCIUM 80 MG: 80 TABLET, FILM COATED ORAL at 20:44

## 2023-05-04 NOTE — CASE MANAGEMENT/SOCIAL WORK
Continued Stay Note  [facility]     Patient Name: Catherine Boyer  MRN: [id number]  Today's Date: 2023    Admit Date: 5/3/2023    Plan: SNF. Referral to [facility] pending. Will need pre cert. Referral to [facility] for daughter to look at [facility] with more services.   Discharge Plan     Row Name 23 1727       Plan    Plan SNF. Referral to [facility] pending. Will need pre cert. Referral to [facility] for daughter to look at [facility] with more services.    Patient/Family in Agreement with Plan yes    Plan Comments Explained roll of . Face sheet and pharmacy verified. Pt lives at [facility] and receives only meals, housekeeping, and laundry.  Daughter voices concern that pt needs more care than [facility] can provide. Daughter states pt does not take her medicine and is depressed to the point she is not taking care of herself. States pt does not go down for meals. States her pt’s   2 yrs. ago and pt had to move to [place] from [place]. Pt has had 7 admissions in the past 8 months and daughter states it is mostly due to pt not taking her meds and her poor diet. Discussed pt going to SNF at D/C and looking for a different assisted living facility that has more services. Discussed referral to [facility]. Daughter agreeable to referral. Referral called and they will contact daughter. Daughter requested referral to [facility] for SNF. Referral placed in Epic and notified [facility]. PT eval pending. Home DME equipment includes grab bars, rollator, Home O2@2LNC at night only through [employer], cane, shower chair, and glucometer. Pt has been to Rehab in [place]. She has used [facility]. Pt’s PCP is Kristi ROMAN with [facility]. Pt no longer drives. Explained that CCP would follow to assess for discharge needs.  Cassius Bates RN-BC               Discharge Codes    No documentation.                Expected Discharge Date and Time     Expected Discharge Date Expected Discharge Time    May 8, 2023             Cassius Bates RN

## 2023-05-04 NOTE — NURSING NOTE
05/04/23 1020   Wound 05/03/23 2005 Left other (see comments) leg Abrasion   Placement Date/Time: 05/03/23 2005   Present on Hospital Admission: Yes  Side: Left  Orientation: (c) other (see comments)  Location: leg  Primary Wound Type: Abrasion   Dressing Appearance open to air   Base pink;dry;scab   Edges rolled/closed   Wound Length (cm) 1.5 cm   Wound Width (cm) 1.5 cm   Wound Surface Area (cm^2) 2.25 cm^2   Drainage Amount none   Care, Wound cleansed with;sterile normal saline   Dressing Care dressing applied  (4x4 optifoam to cover and protect)   Wound/ostomy - consult received for bilateral feet and perirectal area.     Patient has had diarrhea and has excoriation to perirectal area, no bleeding but red and tender, zinc barrier cream is in place but due to excoriation recommend the addition of magic barrier cream alternated with zinc barrier cream be utilized to soothe and heal the skin. Important to keep skin clean and dry    Patient has scattered scabs and partial thickness wounds with dry wound beds to heels and feet and to L medial lower leg. Per chart review when wound nurse saw patient a few months back patient had these same wounds and daughter reported at that time that this is an ongoing issue because patient picks at sites and peels skin. They try to manage this at home by application of aquaphore or lotion and wrap with kerlix to discourage the picking but patient often removes the wrapping. Per the photos from last admission the wounds are improved with dry wound beds and some areas have healed. Recommend that we can try to do that as well, if patient does not like the wrapping can apply aquaphore and place clean socks. The wound to L medial lower leg has rolled edges, dry center, no drainage, healing changed due to patient's ongoing manual manipulation, recommend to keep it covered with an optifoam

## 2023-05-04 NOTE — PROGRESS NOTES
" LOS: 0 days     Name: Catherine Boyer  Age: 83 y.o.  Sex: female  :  1940  MRN: [id number]         Primary Care Physician: Kristi Moreau APRN    Subjective   Subjective  Patient has been vomiting all morning.  Endorses mild epigastric abdominal pain.  Diarrhea last night but none this morning.  Denies fevers or chills.    Objective   Vital Signs  Temp:  [98 °F (36.7 °C)-98.6 °F (37 °C)] 98.1 °F (36.7 °C)  Heart Rate:  [73-89] 76  Resp:  [16-18] 16  BP: ()/(49-58) 123/52  Body mass index is 25.49 kg/m².    Objective:  General Appearance:  Comfortable and in no acute distress (Elderly and frail-appearing).    Vital signs: (most recent): Blood pressure 123/52, pulse 76, temperature 98.1 °F (36.7 °C), temperature source Oral, resp. rate 16, height 154.9 cm (61\"), weight 61.2 kg (134 lb 14.7 oz), SpO2 100 %.    Lungs:  Normal effort and normal respiratory rate.  She is not in respiratory distress.  There are decreased breath sounds.    Heart: Normal rate.  Regular rhythm.    Abdomen: Abdomen is soft.  Bowel sounds are normal.   There is epigastric tenderness.    Extremities: There is no dependent edema or local swelling.    Neurological: Patient is alert and oriented to person, place and time.    Skin:  Warm and dry.              Results Review:       I reviewed the patient's new clinical results.    Results from last 7 days   Lab Units 23  0350 23  1534   WBC 10*3/mm3 36.70* 46.50*   HEMOGLOBIN g/dL 10.7* 11.4*   PLATELETS 10*3/mm3 239 276     Results from last 7 days   Lab Units 23  0350 23  1534   SODIUM mmol/L 138 135*   POTASSIUM mmol/L 3.4* 3.1*   CHLORIDE mmol/L 107 100   CO2 mmol/L 17.5* 20.0*   BUN mg/dL 42* 43*   CREATININE mg/dL 1.42* 1.80*   CALCIUM mg/dL 8.0* 8.3*   GLUCOSE mg/dL 77 97                 Scheduled Meds:   apixaban, 5 mg, Oral, BID  atorvastatin, 80 mg, Oral, Nightly  clopidogrel, 75 mg, Oral, Daily  famotidine, 20 mg, Oral, " Daily  hydrocortisone-bacitracin-zinc oxide-nystatin, 1 application, Topical, BID  latanoprost, 1 drop, Both Eyes, Nightly  metoprolol succinate XL, 25 mg, Oral, BID  Petrolatum, 1 application, Topical, Q24H  ruxolitinib, 15 mg, Oral, BID  sertraline, 100 mg, Oral, Daily  vancomycin, 125 mg, Oral, Q6H      PRN Meds:   •  acetaminophen  •  melatonin  •  ondansetron **OR** ondansetron  •  Pharmacy Consult  •  sodium chloride  Continuous Infusions:  Pharmacy Consult,   sodium chloride 0.9 % with KCl 20 mEq, 100 mL/hr, Last Rate: 100 mL/hr (05/04/23 0920)        Assessment & Plan   Active Hospital Problems    Diagnosis  POA   • Rotavirus enteritis [A08.0]  Unknown   • KARLI (acute kidney injury) [N17.9]  Unknown   • Epigastric abdominal pain [R10.13]  Yes   • Chronic diastolic (congestive) heart failure [I50.32]  Yes   • Hypertension [I10]  Yes   • CAD (coronary artery disease) [I25.10]  Yes   • Myeloproliferative disorder [D47.1]  Yes   • Paroxysmal atrial fibrillation [I48.0]  Yes   • Type 2 diabetes mellitus with circulatory disorder, 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       Assessment & Plan    -Supportive care for rotavirus enteritis.  Continue IV fluids.  Add Compazine which can be alternated with the Zofran for her ongoing vomiting.  Gentle pain medication  -Going for CT scan of the abdomen pelvis this morning.  Await results  -She is C. difficile toxin positive but antigen negative.  Likely representative of colonization.  She was started on oral vancomycin overnight last night.  I am going to leave this in place until we see what things look like on her CT.  -Myeloproliferative disorder noted.  White count about half of what it was 3 weeks ago.  -A-fib is rate controlled.  On Eliquis and Plavix  -Renal function improving with IV fluids.  We will make sure there is no obstructive process on CT.  -Replace potassium today and  check magnesium.    Dispo  To be determined.  Anticipate she will need several days more.    Expected discharge date/ time has not been documented.     Rei Johnson MD  [facility]  05/04/23  11:59 EDT

## 2023-05-04 NOTE — PLAN OF CARE
Goal Outcome Evaluation:    Progress: no change  Outcome Evaluation: Vitals stable. PRN Zofran given 1x, Compazine given 1x. PRN Tylenol given 1x. CT abd/pelvis completed. On clear liquids. Contact Spore/Contact precautions maintained for c/diff & Rotavirus. IVF continued. Skin care provided, turned [id number]. K - 3.4, replaced per protocol. Awaiting recheck. Family at bedside throughout the day. Patient stable and needs met at this time.

## 2023-05-04 NOTE — CONSULTS
Nutrition Services    Patient Name:  Catherine Boyer  YOB: 1940  MRN: [id number]  Admit Date:  5/3/2023  Assessment Date:  05/04/23    Comment: Consult per nurse admission screen and MST score 3.     Received consult for nutrition assessment per nurse admission screen and MST score 3. Pt on clear liquid diet. Spoke with pt at bedside who reports poor appetite and n/v past month. Pt also endorses 20 lb (17.8%) weight loss past 3-4 weeks. NFPE completed with moderate subcutaneous fat and muscle wasting observed. Pt agreeable to ONS TID with meals. +7 BM past 24 hrs per I/Os. Pt is positive for C diff.     Patient meets ASPEN/AND criteria for nutrition diagnosis of moderate malnutrition of chronic illness based on: intake <75% est needs for at least past month, 17.8% weight loss past month, moderate subcutaneous fat and muscle wasting and measurably reduced functional capacity.    Recommendations:   1. PO diet advancement as medically appropriate per MD.     2. Add Boost breeze TID with meals for additional nutrition support.     RD will continue to follow course for PO intake and tolerance.     CLINICAL NUTRITION ASSESSMENT      Reason for Assessment MST score 2+, Nurse Admission Screen     Diagnosis/Problem   Acute renal failure, C diff positive   Medical/Surgical History Past Medical History:   Diagnosis Date   • Atherosclerosis of abdominal aorta 2/5/2023   • Atrial fibrillation    • Breast cancer    • CAD (coronary artery disease)     NSTEMI 2/2022: 90% ostial LAD, 99% D1, 70% mid-distal LAD (medical therapy). She received two stents (2.5x18 and 2.5x26mm Finesse LEFTY) but I don't know which one went to which lesion.   • Carotid atherosclerosis    • Chronic diastolic (congestive) heart failure    • COVID 10/29/2022   • GERD (gastroesophageal reflux disease)    • Glaucoma    • History of cataract    • Hypertension    • Microcytic anemia     per Dr. oleg pate office  note 6/30/22-dd   •  Myeloproliferative disorder     JAK2 positive   • Nonbacterial thrombotic endocarditis     6/2021: 4x5mm vegetation on the ventricular surface of the anterior MV, negative blood cultures   • Porcelain gallbladder    • PUD (peptic ulcer disease)    • TIA (transient ischemic attack)    • Type 2 diabetes mellitus    • Type 2 diabetes mellitus with circulatory disorder, without long-term current use of insulin 7/14/2022   • Upper GI bleed        Past Surgical History:   Procedure Laterality Date   • BREAST LUMPECTOMY  1999   • CARDIAC CATHETERIZATION N/A 09/02/2022    Procedure: Coronary angiography;  Surgeon: Guero Verde MD;  Location: [facility] CATH INVASIVE LOCATION;  Service: Cardiovascular;  Laterality: N/A;   • CARDIAC CATHETERIZATION N/A 09/02/2022    Procedure: Stent LEFTY coronary;  Surgeon: Guero Verde MD;  Location: [facility] CATH INVASIVE LOCATION;  Service: Cardiovascular;  Laterality: N/A;   • CATARACT EXTRACTION  2011   • CHOLECYSTECTOMY     • CHOLECYSTECTOMY WITH INTRAOPERATIVE CHOLANGIOGRAM N/A 11/28/2022    Procedure: CHOLECYSTECTOMY LAPAROSCOPIC INTRAOPERATIVE CHOLANGIOGRAM;  Surgeon: Dani Grover Jr., MD;  Location: [facility] MAIN OR;  Service: General;  Laterality: N/A;   • COLONOSCOPY N/A 02/09/2023    Procedure: COLONOSCOPY TO CECUM & T.I.;  Surgeon: Guero Ferris MD;  Location: [facility] ENDOSCOPY;  Service: Gastroenterology;  Laterality: N/A;  PRE- MELENA, GI BLEED  POST- DIVERTICULOSIS, INT HEMORRHOIDS   • ENDOSCOPY N/A 01/25/2023    Procedure: ESOPHAGOGASTRODUODENOSCOPY WITH BIOPSIES;  Surgeon: Charles Diaz MD;  Location: [facility] ENDOSCOPY;  Service: Gastroenterology;  Laterality: N/A;  pre: abd pain, nausea  post: hiatal hernia, mild gastritis, sloughing of the esophagus   • ENTEROSCOPY SMALL BOWEL N/A 02/09/2023    Procedure: ENTEROSCOPY SMALL BOWEL;  Surgeon: Guero Ferris MD;  Location: [facility] ENDOSCOPY;  Service: Gastroenterology;  Laterality: N/A;  PRE- MELENA, GI BLEED  POST-  "HIATAL HERNIA   • JOINT REPLACEMENT     • UPPER GASTROINTESTINAL ENDOSCOPY          Encounter Information        Nutrition History:  UBW is 140-145 lb per pt report and weight history. Pt with ~20 lb weight loss past 3-4 weeks.    Food Preferences:    Supplements:    Factors Affecting Intake: decreased appetite, diarrhea, nausea     Anthropometrics        Current Height  Current Weight  BMI kg/m2 Height: 154.9 cm (61\")  Weight: 61.2 kg (134 lb 14.7 oz) (05/04/23 0542)  Body mass index is 25.49 kg/m².   Adjusted BMI (if applicable)    BMI Category Normal/Healthy (18.4 - 24.9)       Admission Weight 118 lb (53.5 kg)       Ideal Body Weight (IBW) 105 lb (47.8 kg)   Adjusted IBW (if applicable)        Usual Body Weight (UBW) 140-145 lb (1 month ago)   Weight Change/Trend Loss, Amount/Timeframe: 20 lb (17.8%) weight loss past month       Weight History Wt Readings from Last 30 Encounters:   05/04/23 0542 61.2 kg (134 lb 14.7 oz)   05/03/23 1540 53.5 kg (118 lb)   04/10/23 1305 65.8 kg (145 lb 1.6 oz)   03/21/23 1549 64.7 kg (142 lb 9.6 oz)   02/24/23 1309 65.2 kg (143 lb 12.8 oz)   02/15/23 1446 65.3 kg (144 lb)   02/04/23 1422 66.7 kg (147 lb)   01/23/23 0829 69.5 kg (153 lb 3.5 oz)   01/23/23 0151 68 kg (150 lb)   12/20/22 1339 69 kg (152 lb 3.2 oz)   12/19/22 1620 69.5 kg (153 lb 3.2 oz)   12/19/22 1625 69.5 kg (153 lb 3.2 oz)   11/28/22 1126 70 kg (154 lb 5.2 oz)   11/21/22 0918 70 kg (154 lb 5.2 oz)   11/21/22 0259 68 kg (150 lb)   11/21/22 0024 68 kg (150 lb)   11/18/22 0900 68.5 kg (151 lb)   11/08/22 1520 68.9 kg (151 lb 12.8 oz)   10/30/22 0801 68.9 kg (152 lb)   10/26/22 0834 70.8 kg (156 lb 1.6 oz)   10/07/22 0749 71.2 kg (157 lb)   09/27/22 1640 72.7 kg (160 lb 3.2 oz)   09/13/22 1639 71.4 kg (157 lb 4.8 oz)   09/12/22 1035 71.2 kg (157 lb)   09/04/22 0600 68 kg (150 lb)   09/03/22 0642 69.9 kg (154 lb)   09/01/22 1004 71.7 kg (158 lb)   09/01/22 0249 71.9 kg (158 lb 8.2 oz)   07/14/22 1303 69.1 kg (152 lb " 6.4 oz)   06/30/22 1201 68.1 kg (150 lb 3.2 oz)   05/26/22 0831 63.6 kg (140 lb 4.8 oz)   05/05/22 1008 61.5 kg (135 lb 9.6 oz)           --  Tests/Procedures        Tests/Procedures CT scan     Labs       Pertinent Labs    Results from last 7 days   Lab Units 05/04/23  0350 05/03/23  1534   SODIUM mmol/L 138 135*   POTASSIUM mmol/L 3.4* 3.1*   CHLORIDE mmol/L 107 100   CO2 mmol/L 17.5* 20.0*   BUN mg/dL 42* 43*   CREATININE mg/dL 1.42* 1.80*   CALCIUM mg/dL 8.0* 8.3*   BILIRUBIN mg/dL  --  0.5   ALK PHOS U/L  --  282*   ALT (SGPT) U/L  --  25   AST (SGOT) U/L  --  19   GLUCOSE mg/dL 77 97     Results from last 7 days   Lab Units 05/04/23  0350 05/03/23  1534   MAGNESIUM mg/dL 1.7  --    HEMOGLOBIN g/dL 10.7* 11.4*   HEMATOCRIT % 32.7* 34.3   WBC 10*3/mm3 36.70* 46.50*   ALBUMIN g/dL  --  3.6     Results from last 7 days   Lab Units 05/04/23  0350 05/03/23  1534   PLATELETS 10*3/mm3 239 276     COVID19   Date Value Ref Range Status   10/29/2022 Detected (C) Not Detected - Ref. Range Final     Lab Results   Component Value Date    HGBA1C 6.70 (H) 11/28/2022          Medications           Scheduled Medications apixaban, 5 mg, Oral, BID  atorvastatin, 80 mg, Oral, Nightly  clopidogrel, 75 mg, Oral, Daily  famotidine, 20 mg, Oral, Daily  hydrocortisone-bacitracin-zinc oxide-nystatin, 1 application, Topical, BID  latanoprost, 1 drop, Both Eyes, Nightly  metoprolol succinate XL, 25 mg, Oral, BID  Petrolatum, 1 application, Topical, Q24H  ruxolitinib, 15 mg, Oral, BID  sertraline, 100 mg, Oral, Daily  vancomycin, 125 mg, Oral, Q6H       Infusions Pharmacy Consult,   sodium chloride 0.9 % with KCl 20 mEq, 100 mL/hr, Last Rate: 100 mL/hr (05/04/23 1338)       PRN Medications •  acetaminophen  •  melatonin  •  ondansetron **OR** ondansetron  •  Pharmacy Consult  •  potassium chloride **OR** potassium chloride **OR** potassium chloride  •  prochlorperazine  •  sodium chloride     Physical Findings          Physical Appearance  alert, generalized wasting, hard of hearing, loss of muscle mass, loss of subcutaneous fat, oriented, on oxygen therapy   Oral/Mouth Cavity dentures   Edema  no edema   Gastrointestinal diarrhea, nausea, normoactive, last bowel movement:5/4   Skin  bruising, excoriation, MASD, pressure injury - MASD (latesha anterior groin, bilateral perirectal), unstageable PI (L medial foot)   Tubes/Drains/Lines none   NFPE Date Completed: 5/4     Malnutrition Severity Assessment      Patient meets criteria for : Moderate (non-severe) Malnutrition (intake <75% est needs for at least past month, 17.8% weight loss past month, moderate subcutaneous fat and muscle wasting and measurably reduced functional capacity)  Malnutrition Type (last 8 hours)     Malnutrition Severity Assessment     Row Name 05/04/23 1704       Malnutrition Severity Assessment    Malnutrition Type Chronic Disease - Related Malnutrition    Row Name 05/04/23 1704       Insufficient Energy Intake     Insufficient Energy Intake Findings Moderate    Insufficient Energy Intake  <75% of est. energy requirement for > or equal to 1 month    Row Name 05/04/23 1704       Unintentional Weight Loss     Unintentional Weight Loss Findings Moderate    Unintentional Weight Loss  Weight loss greater than 5% in one month  17.8% weight loss past month per pt report    Row Name 05/04/23 1704       Muscle Loss    Loss of Muscle Mass Findings Moderate    [place] Region Moderate - slight depression    Clavicle Bone Region Moderate - some protrusion in females, visible in males    Acromion Bone Region Moderate - acromion may slightly protrude    Scapular Bone Region Moderate - mild depression, bones may show slightly    Dorsal Hand Region Moderate - slight depression    Patellar Region Moderate - patella more prominent, less muscle definition around patella    Anterior Thigh Region Moderate - mild depression on inner thigh    Posterior Calf Region Moderate - some roundness, slight firmness     Row Name 05/04/23 1704       Fat Loss    Subcutaneous Fat Loss Findings Moderate    Orbital Region  Moderate -  somewhat hollowness, slightly dark circles    Upper Arm Region Moderate - some fat tissue, not ample    Thoracic & Lumbar Region Moderate - ribs visible with mild depressions, iliac crest somewhat prominent    Row Name 05/04/23 1704       Declining Functional Status    Declining Functional Status Findings Measurably Reduced    Row Name 05/04/23 1704       Criteria Met (Must meet criteria for severity in at least 2 of these categories: M Wasting, Fat Loss, Fluid, Secondary Signs, Wt. Status, Intake)    Patient meets criteria for  Moderate (non-severe) Malnutrition  intake <75% est needs for at least past month, 17.8% weight loss past month, moderate subcutaneous fat and muscle wasting and measurably reduced functional capacity                   Current Nutrition Orders & Evaluation of Intake       Oral Nutrition     Food Allergies NKFA   Current PO Diet Diet: Liquid Diets; Clear Liquid; Texture: Regular Texture (IDDSI 7); Fluid Consistency: Thin (IDDSI 0)   Supplement n/a   PO Evaluation     % PO Intake     # of Days Evaluated    --  PES STATEMENT / NUTRITION DIAGNOSIS      Nutrition Dx Problem  Problem: Malnutrition  Etiology: Medical Diagnosis and Factors Affecting Nutrition decreased appetite, nausea  Signs/Symptoms: Clear Liquid Diet, Report of Minimal PO Intake, NFPE Results, Unintended Weight Change and Report/Observation    Comment:    --  NUTRITION INTERVENTION / PLAN OF CARE      Intervention Goal(s) Maintain nutrition status, Nutrition support treatment, Improved nutrition related labs, Reduce/improve symptoms, Meet estimated needs, Disease management/therapy, Tolerate PO , Advance diet, Maintain weight and No significant weight loss         RD Intervention/Action Encourage intake, Follow Tx Progress, Care plan reviewed and Recommend/ordered:          Prescription/Orders:       PO Diet        Supplements Boost Breeze TID with meals      Snacks       Enteral Nutrition       Parenteral Nutrition    New Prescription Ordered? Yes   --      Monitor/Evaluation Per protocol, I&O, PO intake, Supplement intake, Pertinent labs, Weight, Skin status, GI status, Symptoms   Discharge Plan/Needs Pending clinical course   Education Will instruct as appropriate   --    RD to follow per protocol.      Electronically signed by:  Keri Baldwin RD  05/04/23 17:09 EDT

## 2023-05-04 NOTE — PROGRESS NOTES
[facility] Clinical Pharmacy Services: C. Difficile Medication Changes       Pharmacy has been consulted to look over Catherine Boyer's profile to check patient's medications for changes due to C. Difficile diagnosis per Liv ROMAN's request.       Current C. Diff Regimen: vancomycin caps 125 mg    Assessment/Plan/Changes       1. Proton pump inhibitor: pantoprazole 40 mg daily - switched to famotidine 20 mg daily  2. Antiperistalic agents or stool softeners/laxatives: none       Thank you for allowing me to participate in your patient's care.  Please call pharmacy with any questions or concerns.     Tamika Garcia, [facility]  Clinical Pharmacist

## 2023-05-04 NOTE — CASE MANAGEMENT/SOCIAL WORK
Discharge Planning Assessment  [facility]     Patient Name: Catherine Boyer  MRN: [id number]  Today's Date: 5/4/2023    Admit Date: 5/3/2023    Plan: SNF. Referral to [facility] pending. Will need pre cert. Referral to [facility] for daughter to look at [facility] with more services.   Discharge Needs Assessment     Row Name 05/04/23 1707       Living Environment    People in Home facility resident    Name(s) of People in Home Pt lives at [facility] and receives only meals, house keeping, and laundry.    Current Living Arrangements assisted living facility    Potentially Unsafe Housing Conditions none    Primary Care Provided by self    Provides Primary Care For no one, unable/limited ability to care for self    Family Caregiver if Needed child(margarette), adult    Quality of Family Relationships supportive;involved;helpful    Able to Return to Prior Arrangements other (see comments)    Living Arrangement Comments Daughter states pt needs more care than [facility] can provide       Resource/Environmental Concerns    Resource/Environmental Concerns none    Transportation Concerns none       Transition Planning    Patient/Family Anticipates Transition to inpatient rehabilitation facility    Patient/Family Anticipated Services at Transition skilled nursing    Transportation Anticipated other (see comments)       Discharge Needs Assessment    Equipment Currently Used at Home oxygen;rollator;grab bar;cane, straight;shower chair;glucometer  Has O2@2LNC at night only through [employer]    Concerns to be Addressed care coordination/care conferences;discharge planning    Anticipated Changes Related to Illness none    Equipment Needed After Discharge none    Outpatient/Agency/Support Group Needs assisted living facility;skilled nursing facility    Discharge Facility/Level of Care Needs nursing facility, skilled    Provided Post Acute Provider List? Yes    Post Acute Provider List Nursing Home    Provided Post  Acute Provider Quality & Resource List? Yes    Post Acute Provider Quality and Resource List Nursing Home    Delivered To Support Person    Support Person Jeffry Thorpe    Method of Delivery In person    Current Discharge Risk lives alone;chronically ill;non-alliance;psychiatric illness    Discharge Coordination/Progress Daughter states pt does not take her medicine and is depressed to the point she is not taking care of herself.               Discharge Plan     Row Name 23 1727       Plan    Plan SNF. Referral to [facility] pending. Will need pre cert. Referral to [facility] for daughter to look at [facility] with more services.    Patient/Family in Agreement with Plan yes    Plan Comments Explained roll of . Face sheet and pharmacy verified. Pt lives at [facility] and receives only meals, housekeeping, and laundry.  Daughter voices concern that pt needs more care than [facility] can provide. Daughter states pt does not take her medicine and is depressed to the point she is not taking care of herself. States pt does not go down for meals. States her pt’s   2 yrs. ago and pt had to move to [place] from [place]. Pt has had 7 admissions in the past 8 months and daughter states it is mostly due to pt not taking her meds and her poor diet. Discussed pt going to SNF at D/C and looking for a different assisted living facility that has more services. Discussed referral to [facility]. Daughter agreeable to referral. Referral called and they will contact daughter. Daughter requested referral to [facility] for SNF. Referral placed in Epic and notified Donita/Donald. PT eval pending. Home DME equipment includes grab bars, rollator, Home O2@2LNC at night only through [employer], cane, shower chair, and glucometer. Pt has been to Rehab in [place]. She has used [facility] . Pt’s PCP is Kristi ROMAN with [facility]. Pt no longer drives.  Explained that CCP would follow to assess for discharge needs.  Cassius Bates RN-BC              Continued Care and Services - Admitted Since 5/3/2023     Destination     Service Provider Request Status Selected Services Address Phone Fax Patient Preferred    [facility] Pending - Request Sent N/A [address] , [facility] [address] [phone] --            Selected Continued Care - Episodes Includes continued care and service providers with selected services from the active episodes listed below    Oncology Episode start date: 5/18/2022   There are no active outsourced providers for this episode.               Expected Discharge Date and Time     Expected Discharge Date Expected Discharge Time    May 8, 2023          Demographic Summary     Row Name 05/04/23 1707       General Information    Admission Type inpatient    Arrived From emergency department    Required Notices Provided Important Message from Medicare    Reason for Consult decision-making;care coordination/care conference    Preferred Language English               Functional Status     Row Name 05/04/23 1707       Functional Status    Usual Activity Tolerance moderate    Current Activity Tolerance fair                Cassius Bates, RN

## 2023-05-04 NOTE — H&P
HISTORY AND PHYSICAL   [facility]        Date of Admission: 5/3/2023  Patient Identification:  Name: Catherine Boyer  Age: 83 y.o.  Sex: female  :  1940  MRN: [id number]                     Primary Care Physician: Kristi Moreau APRN    Chief Complaint:  83 year old female who presented to the emergency room with nausea, vomiting and diarrhea which started five days ago; she has had some weight loss; she has not been eating well since she had a cholecystectomy a few weeks ago; she was seen by her pcp and told to go to the ED;     History of Present Illness:   As above    Past Medical History:  Past Medical History:   Diagnosis Date   • Atherosclerosis of abdominal aorta 2023   • Atrial fibrillation    • Breast cancer    • CAD (coronary artery disease)     NSTEMI 2022: 90% ostial LAD, 99% D1, 70% mid-distal LAD (medical therapy). She received two stents (2.5x18 and 2.5x26mm [place] LEFTY) but I don't know which one went to which lesion.   • Carotid atherosclerosis    • Chronic diastolic (congestive) heart failure    • COVID 10/29/2022   • GERD (gastroesophageal reflux disease)    • Glaucoma    • History of cataract    • Hypertension    • Microcytic anemia     per Dr. oleg pate office  note 22-dd   • Myeloproliferative disorder     JAK2 positive   • Nonbacterial thrombotic endocarditis     2021: 4x5mm vegetation on the ventricular surface of the anterior MV, negative blood cultures   • Porcelain gallbladder    • PUD (peptic ulcer disease)    • TIA (transient ischemic attack)    • Type 2 diabetes mellitus    • Type 2 diabetes mellitus with circulatory disorder, without long-term current use of insulin 2022   • Upper GI bleed      Past Surgical History:  Past Surgical History:   Procedure Laterality Date   • BREAST LUMPECTOMY     • CARDIAC CATHETERIZATION N/A 2022    Procedure: Coronary angiography;  Surgeon: Guero Verde MD;  Location: [facility] INVASIVE LOCATION;   Service: Cardiovascular;  Laterality: N/A;   • CARDIAC CATHETERIZATION N/A 09/02/2022    Procedure: Stent LEFTY coronary;  Surgeon: Guero Verde MD;  Location: [facility] CATH INVASIVE LOCATION;  Service: Cardiovascular;  Laterality: N/A;   • CATARACT EXTRACTION  2011   • CHOLECYSTECTOMY     • CHOLECYSTECTOMY WITH INTRAOPERATIVE CHOLANGIOGRAM N/A 11/28/2022    Procedure: CHOLECYSTECTOMY LAPAROSCOPIC INTRAOPERATIVE CHOLANGIOGRAM;  Surgeon: Dani Grover Jr., MD;  Location: [facility] MAIN OR;  Service: General;  Laterality: N/A;   • COLONOSCOPY N/A 02/09/2023    Procedure: COLONOSCOPY TO CECUM & T.I.;  Surgeon: Guero Ferris MD;  Location: [facility] ENDOSCOPY;  Service: Gastroenterology;  Laterality: N/A;  PRE- MELENA, GI BLEED  POST- DIVERTICULOSIS, INT HEMORRHOIDS   • ENDOSCOPY N/A 01/25/2023    Procedure: ESOPHAGOGASTRODUODENOSCOPY WITH BIOPSIES;  Surgeon: Charles Diaz MD;  Location: [facility] ENDOSCOPY;  Service: Gastroenterology;  Laterality: N/A;  pre: abd pain, nausea  post: hiatal hernia, mild gastritis, sloughing of the esophagus   • ENTEROSCOPY SMALL BOWEL N/A 02/09/2023    Procedure: ENTEROSCOPY SMALL BOWEL;  Surgeon: Guero Ferris MD;  Location: [facility] ENDOSCOPY;  Service: Gastroenterology;  Laterality: N/A;  PRE- MELENA, GI BLEED  POST- HIATAL HERNIA   • JOINT REPLACEMENT     • UPPER GASTROINTESTINAL ENDOSCOPY        Home Meds:  Medications Prior to Admission   Medication Sig Dispense Refill Last Dose   • acetaminophen (TYLENOL) 500 MG tablet Take 1 tablet by mouth As Needed.      • apixaban (ELIQUIS) 5 MG tablet tablet Take 1 tablet by mouth 2 (Two) Times a Day. 180 tablet 3    • atorvastatin (LIPITOR) 80 MG tablet Take 1 tablet by mouth Every Night. 90 tablet 3    • clopidogrel (PLAVIX) 75 MG tablet TAKE 1 TABLET DAILY (Patient taking differently: Take 1 tablet by mouth Daily.) 30 tablet 11    • empagliflozin (JARDIANCE) 10 MG tablet tablet Take 1 tablet by mouth Daily. 90 tablet 3    • esomeprazole  (nexIUM) 40 MG capsule Take 1 capsule by mouth Every Morning Before Breakfast.      • furosemide (LASIX) 40 MG tablet Take 1 tablet by mouth Daily. 90 tablet 3    • Jakafi 15 MG chemo tablet Take 1 tablet by mouth 2 (Two) Times a Day. 60 tablet 3    • latanoprost (XALATAN) 0.005 % ophthalmic solution Administer 1 drop to both eyes.      • lisinopril (PRINIVIL,ZESTRIL) 2.5 MG tablet Take 1 tablet by mouth Daily.      • metoprolol succinate XL (TOPROL-XL) 25 MG 24 hr tablet Take 1 tablet by mouth 2 (Two) Times a Day. 20 tablet 0    • pantoprazole (PROTONIX) 40 MG EC tablet Take 1 tablet by mouth Daily. 90 tablet 1    • sertraline (ZOLOFT) 100 MG tablet Take 1 tablet by mouth Daily.      • sucralfate (CARAFATE) 1 g tablet Take 1 tablet by mouth 4 (Four) Times a Day.      • ondansetron (Zofran) 4 MG tablet Take 1 tablet by mouth Every 8 (Eight) Hours As Needed for Nausea or Vomiting. 90 tablet 0    • simethicone (MYLICON) 80 MG chewable tablet Chew 1 tablet by mouth 4 (Four) Times a Day As Needed for Flatulence. 100 tablet 0    • traZODone (DESYREL) 50 MG tablet Take 25-50 mg by mouth every night at bedtime.          Allergies:  Allergies   Allergen Reactions   • Aspirin Unknown - Low Severity   • Oxycodone Unknown - Low Severity   • Hydroxyurea Other (See Comments)     Her hemoglobin drop and was stop in February 2022 and start taking Jakafi   • Diphenhydramine Hcl Anxiety and Unknown (See Comments)     Benadryl IVP     Immunizations:  Immunization History   Administered Date(s) Administered   • Pneumococcal Conjugate 13-Valent (PCV13) 07/08/2021     Social History:   Social History     Social History Narrative   • Not on file     Social History     Socioeconomic History   • Marital status:    Tobacco Use   • Smoking status: Former     Packs/day: 1.00     Years: 20.00     Pack years: 20.00     Types: Cigarettes   • Smokeless tobacco: Never   • Tobacco comments:     30  years ago   Vaping Use   • Vaping Use:  "Never used   Substance and Sexual Activity   • Alcohol use: Yes     Comment: \"rare\"   • Drug use: Never   • Sexual activity: Defer       Family History:  Family History   Problem Relation Age of Onset   • Ovarian cancer Mother    • Lung cancer Father    • Lung cancer Sister    • Malig Hyperthermia Neg Hx         Review of Systems  See history of present illness and past medical history.  Patient denies headache, dizziness, syncope, falls, trauma, change in vision, change in hearing,  focal weakness, numbness, or paresthesia.  Patient denies chest pain, palpitations, dyspnea, orthopnea, PND, cough, sinus pressure, rhinorrhea, epistaxis, hemoptysis,  hematemesis,  constipation or hematchezia.  Denies cold or heat intolerance, polydipsia, polyuria, polyphagia. Denies hematuria, pyuria, dysuria, hesitancy, frequency or urgency. Denies consumption of raw and under cooked meats foods or change in water source.  Denies fever, chills, sweats, night sweats.  Denies missing any routine medications. Remainder of ROS is negative.    Objective:  T Max 24 hrs: Temp (24hrs), Av.3 °F (36.8 °C), Min:98 °F (36.7 °C), Max:98.6 °F (37 °C)    Vitals Ranges:   Temp:  [98 °F (36.7 °C)-98.6 °F (37 °C)] 98.6 °F (37 °C)  Heart Rate:  [73-89] 73  Resp:  [16-18] 16  BP: ()/(49-58) 104/49      Exam:  /49 (BP Location: Left arm, Patient Position: Lying)   Pulse 73   Temp 98.6 °F (37 °C) (Oral)   Resp 16   Ht 154.9 cm (61\")   Wt 53.5 kg (118 lb)   SpO2 93%   BMI 22.30 kg/m²     General Appearance:    Alert, cooperative, no distress, appears stated age   Head:    Normocephalic, without obvious abnormality, atraumatic   Eyes:    PERRL, conjunctivae/corneas clear, EOM's intact, both eyes   Ears:    Normal external ear canals, both ears   Nose:   Nares normal, septum midline, mucosa normal, no drainage    or sinus tenderness   Throat:   Lips, mucosa, and tongue normal   Neck:   Supple, symmetrical, trachea midline, no " adenopathy;     thyroid:  no enlargement/tenderness/nodules; no carotid    bruit or JVD   Back:     Symmetric, no curvature, ROM normal, no CVA tenderness   Lungs:     Decreased breath sounds bilaterally, respirations unlabored   Chest Wall:    No tenderness or deformity    Heart:    Regular rate and rhythm, S1 and S2 normal, no murmur, rub   or gallop   Abdomen:     Soft, nontender, bowel sounds active all four quadrants,     no masses, no hepatomegaly, no splenomegaly   Extremities:   Extremities normal, atraumatic, no cyanosis or edema   Pulses:   2+ and symmetric all extremities   Skin:   Skin color, texture, turgor normal, no rashes or lesions               .    Data Review:  Labs in chart were reviewed.  WBC   Date Value Ref Range Status   05/03/2023 46.50 (C) 3.40 - 10.80 10*3/mm3 Final     Hemoglobin   Date Value Ref Range Status   05/03/2023 11.4 (L) 12.0 - 15.9 g/dL Final     Hematocrit   Date Value Ref Range Status   05/03/2023 34.3 34.0 - 46.6 % Final     Platelets   Date Value Ref Range Status   05/03/2023 276 140 - 450 10*3/mm3 Final     Sodium   Date Value Ref Range Status   05/03/2023 135 (L) 136 - 145 mmol/L Final     Potassium   Date Value Ref Range Status   05/03/2023 3.1 (L) 3.5 - 5.2 mmol/L Final     Chloride   Date Value Ref Range Status   05/03/2023 100 98 - 107 mmol/L Final     CO2   Date Value Ref Range Status   05/03/2023 20.0 (L) 22.0 - 29.0 mmol/L Final     BUN   Date Value Ref Range Status   05/03/2023 43 (H) 8 - 23 mg/dL Final     Creatinine   Date Value Ref Range Status   05/03/2023 1.80 (H) 0.57 - 1.00 mg/dL Final     Glucose   Date Value Ref Range Status   05/03/2023 97 65 - 99 mg/dL Final     Calcium   Date Value Ref Range Status   05/03/2023 8.3 (L) 8.6 - 10.5 mg/dL Final     AST (SGOT)   Date Value Ref Range Status   05/03/2023 19 1 - 32 U/L Final     ALT (SGPT)   Date Value Ref Range Status   05/03/2023 25 1 - 33 U/L Final     Alkaline Phosphatase   Date Value Ref Range Status    05/03/2023 282 (H) 39 - 117 U/L Final                Imaging Results (All)     None            Assessment:  Active Hospital Problems    Diagnosis  POA   • **Acute renal failure, unspecified acute renal failure type [N17.9]  Yes      Resolved Hospital Problems   No resolved problems to display.   hypokalemia  Anemia  A fib  Cad  hypertension  Diabetes  Chronic diastolic chf  Hypokalemia  Weight loss  Myeloproliferative disorder    Plan:  Will replace potassium  Iv fluids  Antiemetics  Monitor on telemetry  accu checks, sliding scale  Trend blood counts  Dw. Patient and ED provider    Jaimee Macias MD  5/3/2023  20:55 EDT

## 2023-05-04 NOTE — DISCHARGE PLACEMENT REQUEST
"Maribell Boyer (83 y.o. Female)     Date of Birth   1940    Social Security Number       Address   [address] [place] [address] [address] [place] [address]    Home Phone   [phone]    MRN   [id number]       Jew   Unknown    Marital Status                               Admission Date   5/3/23    Admission Type   Emergency    Admitting Provider   Jaimee Macias MD    Attending Provider   Meg Johnson MD    Department, Room/Bed   [address], E463/1       Discharge Date       Discharge Disposition       Discharge Destination                               Attending Provider: Meg Johnson MD    Allergies: Aspirin, Oxycodone, Hydroxyurea, Diphenhydramine Hcl    Isolation: Spore, Contact   Infection: C.difficile (05/03/23), Rotavirus (05/03/23)   Code Status: No CPR    Ht: 154.9 cm (61\")   Wt: 61.2 kg (134 lb 14.7 oz)    Admission Cmt: None   Principal Problem: None                Active Insurance as of 5/3/2023     Primary Coverage     Payor Plan Insurance Group Employer/Plan Group    AETNA MEDICARE REPLACEMENT AETNA MEDICARE REPLACEMENT [id number]     Payor Plan Address Payor Plan Phone Number Payor Plan Fax Number Effective Dates    [address] [phone]  1/1/2022 - None Entered    [facility] [id number]       Subscriber Name Subscriber Birth Date Member ID       MARIBELL BOYER 1940 [id number]                 Emergency Contacts      (Rel.) Home Phone Work Phone Mobile Phone    RAY BOYER (Daughter) [phone] -- [phone]    Karen Gutierrez (Daughter) -- -- [phone]    MerlinGreg (Son) -- -- [phone]    Meg Boyer (Brother) [phone] -- --    MEG BANKS (Brother) -- -- [phone]              "

## 2023-05-04 NOTE — PLAN OF CARE
Goal Outcome Evaluation:  Plan of Care Reviewed With: patient        Progress: no change  Outcome Evaluation: vitals stable.  pt expressed pain.  meds given per orders.  turn q 2 hours.  mirtha care as needed.  external catheter in place.  isolation precautions maintained.  skin breakdown noted.  wound consult in place.  will continue to monitor.

## 2023-05-05 ENCOUNTER — APPOINTMENT (OUTPATIENT)
Dept: GENERAL RADIOLOGY | Facility: HOSPITAL | Age: 83
End: 2023-05-05
Payer: MEDICARE

## 2023-05-05 PROBLEM — Z22.1 CLOSTRIDIUM DIFFICILE CARRIER: Status: ACTIVE | Noted: 2023-05-05

## 2023-05-05 PROBLEM — E44.0 MODERATE MALNUTRITION: Status: ACTIVE | Noted: 2023-05-05

## 2023-05-05 LAB
ANION GAP SERPL CALCULATED.3IONS-SCNC: 11.4 MMOL/L (ref 5–15)
BUN SERPL-MCNC: 27 MG/DL (ref 8–23)
BUN/CREAT SERPL: 36 (ref 7–25)
CALCIUM SPEC-SCNC: 8.6 MG/DL (ref 8.6–10.5)
CHLORIDE SERPL-SCNC: 113 MMOL/L (ref 98–107)
CO2 SERPL-SCNC: 15.6 MMOL/L (ref 22–29)
CREAT SERPL-MCNC: 0.75 MG/DL (ref 0.57–1)
DEPRECATED RDW RBC AUTO: 54.8 FL (ref 37–54)
EGFRCR SERPLBLD CKD-EPI 2021: 78.1 ML/MIN/1.73
ERYTHROCYTE [DISTWIDTH] IN BLOOD BY AUTOMATED COUNT: 16.4 % (ref 12.3–15.4)
GEN 5 2HR TROPONIN T REFLEX: 29 NG/L
GLUCOSE SERPL-MCNC: 105 MG/DL (ref 65–99)
HCT VFR BLD AUTO: 31.3 % (ref 34–46.6)
HGB BLD-MCNC: 10.3 G/DL (ref 12–15.9)
MAGNESIUM SERPL-MCNC: 1.7 MG/DL (ref 1.6–2.4)
MCH RBC QN AUTO: 30.7 PG (ref 26.6–33)
MCHC RBC AUTO-ENTMCNC: 32.9 G/DL (ref 31.5–35.7)
MCV RBC AUTO: 93.4 FL (ref 79–97)
PLATELET # BLD AUTO: 238 10*3/MM3 (ref 140–450)
PMV BLD AUTO: 11.8 FL (ref 6–12)
POTASSIUM SERPL-SCNC: 5.1 MMOL/L (ref 3.5–5.2)
QT INTERVAL: 362 MS
RBC # BLD AUTO: 3.35 10*6/MM3 (ref 3.77–5.28)
SODIUM SERPL-SCNC: 140 MMOL/L (ref 136–145)
TROPONIN T DELTA: -3 NG/L
TROPONIN T SERPL HS-MCNC: 32 NG/L
WBC NRBC COR # BLD: 64.55 10*3/MM3 (ref 3.4–10.8)

## 2023-05-05 PROCEDURE — 83735 ASSAY OF MAGNESIUM: CPT | Performed by: INTERNAL MEDICINE

## 2023-05-05 PROCEDURE — 85027 COMPLETE CBC AUTOMATED: CPT | Performed by: INTERNAL MEDICINE

## 2023-05-05 PROCEDURE — 84484 ASSAY OF TROPONIN QUANT: CPT | Performed by: INTERNAL MEDICINE

## 2023-05-05 PROCEDURE — 71046 X-RAY EXAM CHEST 2 VIEWS: CPT

## 2023-05-05 PROCEDURE — 99222 1ST HOSP IP/OBS MODERATE 55: CPT | Performed by: INTERNAL MEDICINE

## 2023-05-05 PROCEDURE — 63710000001 RUXOLITINIB 15 MG TABLET: Performed by: INTERNAL MEDICINE

## 2023-05-05 PROCEDURE — 93010 ELECTROCARDIOGRAM REPORT: CPT | Performed by: INTERNAL MEDICINE

## 2023-05-05 PROCEDURE — 25010000002 SODIUM CHLORIDE 0.9 % WITH KCL 20 MEQ 20-0.9 MEQ/L-% SOLUTION: Performed by: INTERNAL MEDICINE

## 2023-05-05 PROCEDURE — C9399 UNCLASSIFIED DRUGS OR BIOLOG: HCPCS | Performed by: INTERNAL MEDICINE

## 2023-05-05 PROCEDURE — 80048 BASIC METABOLIC PNL TOTAL CA: CPT | Performed by: INTERNAL MEDICINE

## 2023-05-05 PROCEDURE — 93005 ELECTROCARDIOGRAM TRACING: CPT | Performed by: INTERNAL MEDICINE

## 2023-05-05 RX ORDER — SODIUM CHLORIDE 9 MG/ML
75 INJECTION, SOLUTION INTRAVENOUS CONTINUOUS
Status: DISCONTINUED | OUTPATIENT
Start: 2023-05-05 | End: 2023-05-06

## 2023-05-05 RX ORDER — NITROGLYCERIN 0.4 MG/1
0.4 TABLET SUBLINGUAL
Status: DISCONTINUED | OUTPATIENT
Start: 2023-05-05 | End: 2023-05-16 | Stop reason: HOSPADM

## 2023-05-05 RX ORDER — SODIUM BICARBONATE 650 MG/1
650 TABLET ORAL 3 TIMES DAILY
Status: DISCONTINUED | OUTPATIENT
Start: 2023-05-05 | End: 2023-05-16 | Stop reason: HOSPADM

## 2023-05-05 RX ADMIN — METOPROLOL SUCCINATE 25 MG: 25 TABLET, EXTENDED RELEASE ORAL at 08:59

## 2023-05-05 RX ADMIN — LATANOPROST 1 DROP: 50 SOLUTION OPHTHALMIC at 20:55

## 2023-05-05 RX ADMIN — RUXOLITINIB 15 MG: 15 TABLET ORAL at 10:20

## 2023-05-05 RX ADMIN — SODIUM BICARBONATE 650 MG: 650 TABLET ORAL at 20:54

## 2023-05-05 RX ADMIN — NITROGLYCERIN 0.4 MG: 0.4 TABLET SUBLINGUAL at 08:55

## 2023-05-05 RX ADMIN — VANCOMYCIN HYDROCHLORIDE 125 MG: 125 CAPSULE ORAL at 06:36

## 2023-05-05 RX ADMIN — SODIUM CHLORIDE 75 ML/HR: 9 INJECTION, SOLUTION INTRAVENOUS at 13:19

## 2023-05-05 RX ADMIN — FAMOTIDINE 20 MG: 20 TABLET, FILM COATED ORAL at 09:03

## 2023-05-05 RX ADMIN — ACETAMINOPHEN 650 MG: 325 TABLET, FILM COATED ORAL at 21:00

## 2023-05-05 RX ADMIN — RUXOLITINIB 15 MG: 15 TABLET ORAL at 22:37

## 2023-05-05 RX ADMIN — SERTRALINE 100 MG: 100 TABLET, FILM COATED ORAL at 09:03

## 2023-05-05 RX ADMIN — ZINC OXIDE 1 APPLICATION: 200 OINTMENT TOPICAL at 10:21

## 2023-05-05 RX ADMIN — ATORVASTATIN CALCIUM 80 MG: 80 TABLET, FILM COATED ORAL at 20:53

## 2023-05-05 RX ADMIN — APIXABAN 5 MG: 5 TABLET, FILM COATED ORAL at 09:03

## 2023-05-05 RX ADMIN — APIXABAN 5 MG: 5 TABLET, FILM COATED ORAL at 20:53

## 2023-05-05 RX ADMIN — VANCOMYCIN HYDROCHLORIDE 125 MG: 125 CAPSULE ORAL at 00:53

## 2023-05-05 RX ADMIN — METOPROLOL SUCCINATE 25 MG: 25 TABLET, EXTENDED RELEASE ORAL at 20:54

## 2023-05-05 RX ADMIN — ZINC OXIDE 1 APPLICATION: 200 OINTMENT TOPICAL at 20:54

## 2023-05-05 RX ADMIN — SODIUM BICARBONATE 650 MG: 650 TABLET ORAL at 16:43

## 2023-05-05 RX ADMIN — ACETAMINOPHEN 650 MG: 325 TABLET, FILM COATED ORAL at 00:53

## 2023-05-05 RX ADMIN — PETROLATUM 1 APPLICATION: 420 OINTMENT TOPICAL at 10:21

## 2023-05-05 RX ADMIN — POTASSIUM CHLORIDE AND SODIUM CHLORIDE 100 ML/HR: 900; 150 INJECTION, SOLUTION INTRAVENOUS at 09:04

## 2023-05-05 RX ADMIN — CLOPIDOGREL BISULFATE 75 MG: 75 TABLET, FILM COATED ORAL at 09:03

## 2023-05-05 RX ADMIN — ACETAMINOPHEN 650 MG: 325 TABLET, FILM COATED ORAL at 06:36

## 2023-05-05 NOTE — CONSULTS
Date of Consultation: 23    Referral Provider: Jaimee Macias, *     Reason for Consultation: Chest pain, elevated troponin.    Encounter Provider: Braden Frausto MD    Group of Service: [facility]     Patient Name: Catherine Boyer    :1940    Chief complaint: Nausea, vomiting, diarrhea.    History of Present Illness:      This is a very pleasant 83 year-old female who has been followed by Dr. Barriga in our group.  She does have a history of coronary artery disease, and was found to have severe disease in her LAD and diagonal in 2022.  [place] She underwent a catheterization on 2022 at [facility], at which time she was found to have a 99% in-stent restenosis of the diagonal stent, as well as 60% proximal to mid LAD disease and 100% occlusion of the mid RCA with left-to-right collaterals.  She underwent placement of a 2.25 x 15 mm [place] LEFTY to the diagonal at that time.  He was previously followed in [place].  Her echocardiogram on 2022 showed an ejection fraction of 60 to 65%, although the apex was noted to be akinetic.  She also had grade 2 diastolic dysfunction at that time.    The patient presented with nausea, vomiting, and diarrhea.  She has been found to be positive for rotavirus enteritis.  She is being hydrated with IV fluids.  She does have a myeloproliferative disorder, her white count is elevated.  However, she does not appear to have an acute bacterial infection.  She does have a history of paroxysmal atrial fibrillation as well.  She had an episode of chest discomfort earlier today which she described as a hard pain over her left chest.  Her EKG did not show any acute changes.  Her troponin has been elevated but flat at 32 and 29.        Past Medical History:   Diagnosis Date   • Atherosclerosis of abdominal aorta 2023   • Atrial fibrillation    • Breast cancer    • CAD (coronary artery  disease)     NSTEMI 2/2022: 90% ostial LAD, 99% D1, 70% mid-distal LAD (medical therapy). She received two stents (2.5x18 and 2.5x26mm [place] LEFTY) but I don't know which one went to which lesion.   • Carotid atherosclerosis    • Chronic diastolic (congestive) heart failure    • COVID 10/29/2022   • GERD (gastroesophageal reflux disease)    • Glaucoma    • History of cataract    • Hypertension    • Microcytic anemia     per Dr. oleg pate office  note 6/30/22-dd   • Myeloproliferative disorder     JAK2 positive   • Nonbacterial thrombotic endocarditis     6/2021: 4x5mm vegetation on the ventricular surface of the anterior MV, negative blood cultures   • Porcelain gallbladder    • PUD (peptic ulcer disease)    • TIA (transient ischemic attack)    • Type 2 diabetes mellitus    • Type 2 diabetes mellitus with circulatory disorder, without long-term current use of insulin 7/14/2022   • Upper GI bleed          Past Surgical History:   Procedure Laterality Date   • BREAST LUMPECTOMY  1999   • CARDIAC CATHETERIZATION N/A 09/02/2022    Procedure: Coronary angiography;  Surgeon: Guero Verde MD;  Location: [facility] CATH INVASIVE LOCATION;  Service: Cardiovascular;  Laterality: N/A;   • CARDIAC CATHETERIZATION N/A 09/02/2022    Procedure: Stent LEFTY coronary;  Surgeon: Guero Verde MD;  Location: [facility] CATH INVASIVE LOCATION;  Service: Cardiovascular;  Laterality: N/A;   • CATARACT EXTRACTION  2011   • CHOLECYSTECTOMY     • CHOLECYSTECTOMY WITH INTRAOPERATIVE CHOLANGIOGRAM N/A 11/28/2022    Procedure: CHOLECYSTECTOMY LAPAROSCOPIC INTRAOPERATIVE CHOLANGIOGRAM;  Surgeon: Dani Grover Jr., MD;  Location: [facility] MAIN OR;  Service: General;  Laterality: N/A;   • COLONOSCOPY N/A 02/09/2023    Procedure: COLONOSCOPY TO CECUM & T.I.;  Surgeon: Guero Ferris MD;  Location: [facility] ENDOSCOPY;  Service: Gastroenterology;  Laterality: N/A;  PRE- MELENA, GI BLEED  POST- DIVERTICULOSIS, INT HEMORRHOIDS   • ENDOSCOPY N/A  01/25/2023    Procedure: ESOPHAGOGASTRODUODENOSCOPY WITH BIOPSIES;  Surgeon: Charles Diaz MD;  Location: [facility] ENDOSCOPY;  Service: Gastroenterology;  Laterality: N/A;  pre: abd pain, nausea  post: hiatal hernia, mild gastritis, sloughing of the esophagus   • ENTEROSCOPY SMALL BOWEL N/A 02/09/2023    Procedure: ENTEROSCOPY SMALL BOWEL;  Surgeon: Guero Ferris MD;  Location: [facility] ENDOSCOPY;  Service: Gastroenterology;  Laterality: N/A;  PRE- MELENA, GI BLEED  POST- HIATAL HERNIA   • JOINT REPLACEMENT     • UPPER GASTROINTESTINAL ENDOSCOPY           Allergies   Allergen Reactions   • Aspirin Unknown - Low Severity   • Oxycodone Unknown - Low Severity   • Hydroxyurea Other (See Comments)     Her hemoglobin drop and was stop in February 2022 and start taking Jakafi   • Diphenhydramine Hcl Anxiety and Unknown (See Comments)     Benadryl IVP         No current facility-administered medications on file prior to encounter.     Current Outpatient Medications on File Prior to Encounter   Medication Sig Dispense Refill   • acetaminophen (TYLENOL) 500 MG tablet Take 1 tablet by mouth As Needed.     • apixaban (ELIQUIS) 5 MG tablet tablet Take 1 tablet by mouth 2 (Two) Times a Day. 180 tablet 3   • atorvastatin (LIPITOR) 80 MG tablet Take 1 tablet by mouth Every Night. 90 tablet 3   • clopidogrel (PLAVIX) 75 MG tablet TAKE 1 TABLET DAILY (Patient taking differently: Take 1 tablet by mouth Daily.) 30 tablet 11   • empagliflozin (JARDIANCE) 10 MG tablet tablet Take 1 tablet by mouth Daily. 90 tablet 3   • esomeprazole (nexIUM) 40 MG capsule Take 1 capsule by mouth Every Morning Before Breakfast.     • furosemide (LASIX) 40 MG tablet Take 1 tablet by mouth Daily. 90 tablet 3   • Jakafi 15 MG chemo tablet Take 1 tablet by mouth 2 (Two) Times a Day. 60 tablet 3   • latanoprost (XALATAN) 0.005 % ophthalmic solution Administer 1 drop to both eyes.     • lisinopril (PRINIVIL,ZESTRIL) 2.5 MG tablet Take 1 tablet by mouth Daily.    "  • metoprolol succinate XL (TOPROL-XL) 25 MG 24 hr tablet Take 1 tablet by mouth 2 (Two) Times a Day. 20 tablet 0   • pantoprazole (PROTONIX) 40 MG EC tablet Take 1 tablet by mouth Daily. 90 tablet 1   • sertraline (ZOLOFT) 100 MG tablet Take 1 tablet by mouth Daily.     • sucralfate (CARAFATE) 1 g tablet Take 1 tablet by mouth 4 (Four) Times a Day.     • ondansetron (Zofran) 4 MG tablet Take 1 tablet by mouth Every 8 (Eight) Hours As Needed for Nausea or Vomiting. 90 tablet 0   • simethicone (MYLICON) 80 MG chewable tablet Chew 1 tablet by mouth 4 (Four) Times a Day As Needed for Flatulence. 100 tablet 0   • traZODone (DESYREL) 50 MG tablet Take 25-50 mg by mouth every night at bedtime.           Social History     Socioeconomic History   • Marital status:    Tobacco Use   • Smoking status: Former     Packs/day: 1.00     Years: 20.00     Pack years: 20.00     Types: Cigarettes   • Smokeless tobacco: Never   • Tobacco comments:     30  years ago   Vaping Use   • Vaping Use: Never used   Substance and Sexual Activity   • Alcohol use: Yes     Comment: \"rare\"   • Drug use: Never   • Sexual activity: Defer         Family History   Problem Relation Age of Onset   • Ovarian cancer Mother    • Lung cancer Father    • Lung cancer Sister    • Malig Hyperthermia Neg Hx        REVIEW OF SYSTEMS:   Pertinent positives are noted in the HPI above.  Otherwise, all other systems were reviewed, and are negative.     Objective:     Vitals:    05/05/23 0910 05/05/23 0913 05/05/23 0919 05/05/23 1348   BP:  131/74  108/42   BP Location:    Right arm   Patient Position:    Lying   Pulse:  95  89   Resp:    18   Temp:    97.6 °F (36.4 °C)   TempSrc:    Oral   SpO2: (!) 88% 90% 93% 90%   Weight:       Height:         Body mass index is 26.41 kg/m².  Flowsheet Rows    Flowsheet Row First Filed Value   Admission Height 154.9 cm (61\") Documented at 05/03/2023 1540   Admission Weight 53.5 kg (118 lb) Documented at 05/03/2023 1540    "        General:    No acute distress, alert and oriented x4, elderly and frail.                   Head:    Normocephalic, atraumatic.   Eyes:          Conjunctivae and sclerae normal, no icterus.   Throat:   No oral lesions, no thrush, oral mucosa moist.    Neck:   Supple, trachea midline.   Lungs:     Decreased breath sounds bilaterally.    Heart:    Regular rhythm and normal rate.  II/VI SM LLSB.   Abdomen:     Soft, non-tender, non-distended, positive bowel sounds.    Extremities:   Bilateral dressings on the ankles.   Pulses:   Pulses palpable and equal bilaterally.    Skin:   No bleeding or rash.   Neuro:   Non-focal.  Moves all extremities well.    Psychiatric:   Normal mood and affect.         Lab Review:                Results from last 7 days   Lab Units 05/05/23  0524   SODIUM mmol/L 140   POTASSIUM mmol/L 5.1   CHLORIDE mmol/L 113*   CO2 mmol/L 15.6*   BUN mg/dL 27*   CREATININE mg/dL 0.75   GLUCOSE mg/dL 105*   CALCIUM mg/dL 8.6     Results from last 7 days   Lab Units 05/05/23  1100 05/05/23  0902   HSTROP T ng/L 29* 32*     Results from last 7 days   Lab Units 05/05/23  0524   WBC 10*3/mm3 64.55*   HEMOGLOBIN g/dL 10.3*   HEMATOCRIT % 31.3*   PLATELETS 10*3/mm3 238             Results from last 7 days   Lab Units 05/05/23  0902   MAGNESIUM mg/dL 1.7           EKG (reviewed by me personally): Normal sinus rhythm, nonspecific ST changes.      Assessment:   1.  Rotavirus enteritis with nausea, vomiting, and diarrhea  2.  Acute kidney injury  3.  Chest pain with nonspecific troponin elevation  4.  Myeloproliferative disorder with significantly elevated white blood cell count  5.  Multifactorial anemia  6.  Coronary artery disease, status post LEFTY to the LAD and diagonal in February 2022.  99% in-stent restenosis of the diagonal stent with placement of a new stent on 9/2/2022 at [facility].  Also found to have 60% proximal to mid LAD disease and 100% occlusion of the mid RCA with left-to-right collaterals at  that time.  7.  Akinetic apex with preserved LV function by echo in September 2022  8.  Paroxysmal atrial fibrillation  9.  History of TIA  10.  Diabetes    Plan:       The patient's chest discomfort was fairly fleeting earlier today.  Her troponin was flat at 32 and 29, and I feel this is a nonspecific troponin elevation.  This is not consistent with a type I or type II NSTEMI.  Her EKG did not show any acute changes.  She obviously does have coronary artery disease as detailed above.  However, for now, I would keep her medications the same with Plavix 75 mg/day and Toprol-XL 25 mg twice a day.  She is also on statin therapy with Lipitor 80 mg/day.    She was in sinus rhythm when I saw her.  She is on the Toprol for atrial fibrillation as well.  She is also on Eliquis at 5 mg twice a day for anticoagulation.  I will check an echocardiogram to evaluate for any new wall motion abnormalities.  It was noted on her previous echo in September 2022 that she had an akinetic apex with preserved left ventricular function.    Discussed in detail at bedside with the patient and her son.    Thank you very much for this consult.    Sharan Frausto MD

## 2023-05-05 NOTE — PLAN OF CARE
Goal Outcome Evaluation:    Progress: improving  Outcome Evaluation: Vitals stable. C/o chest pain, midsternum, 6/10 pressure this AM along with SOA. O2 increased to 5L HF at one point to maintain O2 sats >90%. Troponin and magnesium ordered. Nitro given 1x. Chest pain resolved after nitro given, SOA improved but lingered. O2 weaned to 2L NC. Initial troponin was 32, latest was 29. Cardio consulted. Awaiting echo. CXR showed bilateral infiltrates. Dr. oJhnson aware. Contact Spore/Contact precautions maintained (c.diff/Rotavirus). IVF decreased to 75mL/hr. Wound care completed. Daughter at bedside throughout the day. Patient stable and needs met at this time.

## 2023-05-05 NOTE — CASE MANAGEMENT/SOCIAL WORK
Continued Stay Note  [facility]     Patient Name: Catherine Boyer  MRN: [id number]  Today's Date: 5/5/2023    Admit Date: 5/3/2023    Plan: SNF referral to [facility] pending. Will need pre cert. PT eval pending   Discharge Plan     Row Name 05/05/23 1700       Plan    Plan SNF referral to [facility] pending. Will need pre cert. PT eval pending    Patient/Family in Agreement with Plan yes    Plan Comments PT eval remains pending. Referral to [facility] SNF. Will need to follow up Monday on bed availablity and have pre cert started. Will be here through the weekend. Cassius Bates RN-BC               Discharge Codes    No documentation.               Expected Discharge Date and Time     Expected Discharge Date Expected Discharge Time    May 8, 2023             Cassius Bates RN

## 2023-05-05 NOTE — PROGRESS NOTES
" LOS: 1 day     Name: Catherine Boyer  Age: 83 y.o.  Sex: female  :  1940  MRN: [id number]         Primary Care Physician: Kristi Moreau APRN    Subjective   Subjective  Nausea, vomiting, and diarrhea are much improved.  She denies abdominal pain.  Patient had an episode of chest pain and shortness of breath this morning which was relieved with nitroglycerin.    Objective   Vital Signs  Temp:  [97.5 °F (36.4 °C)-98.3 °F (36.8 °C)] 97.5 °F (36.4 °C)  Heart Rate:  [61-96] 95  Resp:  [18-24] 24  BP: ()/(45-78) 131/74  Body mass index is 26.41 kg/m².    Objective:  General Appearance:  Comfortable, in no acute distress and ill-appearing (Elderly, frail, weak and deconditioned appearing).    Vital signs: (most recent): Blood pressure 131/74, pulse 95, temperature 97.5 °F (36.4 °C), temperature source Oral, resp. rate 24, height 154.9 cm (61\"), weight 63.4 kg (139 lb 12.4 oz), SpO2 93 %.    Lungs:  Normal effort and normal respiratory rate.  She is not in respiratory distress.  There are decreased breath sounds.    Heart: Normal rate.  Regular rhythm.    Abdomen: Abdomen is soft.  Bowel sounds are normal.   There is no abdominal tenderness.     Extremities: There is no dependent edema or local swelling.    Neurological: Patient is alert and oriented to person, place and time.    Skin:  Warm and dry.              Results Review:       I reviewed the patient's new clinical results.    Results from last 7 days   Lab Units 23  0523  0350 23  1534   WBC 10*3/mm3 64.55* 36.70* 46.50*   HEMOGLOBIN g/dL 10.3* 10.7* 11.4*   PLATELETS 10*3/mm3 238 239 276     Results from last 7 days   Lab Units 23  0524 23  2154 23  0350 23  1534   SODIUM mmol/L 140  --  138 135*   POTASSIUM mmol/L 5.1 4.7 3.4* 3.1*   CHLORIDE mmol/L 113*  --  107 100   CO2 mmol/L 15.6*  --  17.5* 20.0*   BUN mg/dL 27*  --  42* 43*   CREATININE mg/dL 0.75  --  1.42* 1.80*   CALCIUM mg/dL 8.6  --  8.0* 8.3* "   GLUCOSE mg/dL 105*  --  77 97                 Scheduled Meds:   apixaban, 5 mg, Oral, BID  atorvastatin, 80 mg, Oral, Nightly  clopidogrel, 75 mg, Oral, Daily  famotidine, 20 mg, Oral, Daily  hydrocortisone-bacitracin-zinc oxide-nystatin, 1 application, Topical, BID  latanoprost, 1 drop, Both Eyes, Nightly  metoprolol succinate XL, 25 mg, Oral, BID  Petrolatum, 1 application, Topical, Q24H  ruxolitinib, 15 mg, Oral, BID  sertraline, 100 mg, Oral, Daily  vancomycin, 125 mg, Oral, Q6H      PRN Meds:   •  acetaminophen  •  melatonin  •  nitroglycerin  •  ondansetron **OR** ondansetron  •  Pharmacy Consult  •  potassium chloride **OR** potassium chloride **OR** potassium chloride  •  prochlorperazine  •  sodium chloride  Continuous Infusions:  Pharmacy Consult,   sodium chloride 0.9 % with KCl 20 mEq, 100 mL/hr, Last Rate: 100 mL/hr (05/05/23 0904)        Assessment & Plan   Active Hospital Problems    Diagnosis  POA   • **Rotavirus enteritis [A08.0]  Unknown   • Moderate Malnutrition (HCC) [E44.0]  Yes   • 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 [Z22.1]  Not Applicable   • KARLI (acute kidney injury) [N17.9]  Unknown   • Acute renal failure, unspecified acute renal failure type [N17.9]  Yes   • Epigastric abdominal pain [R10.13]  Yes   • Chronic diastolic (congestive) heart failure [I50.32]  Yes   • Hypertension [I10]  Yes   • CAD (coronary artery disease) [I25.10]  Yes   • Myeloproliferative disorder [D47.1]  Yes   • Paroxysmal atrial fibrillation [I48.0]  Yes   • Type 2 diabetes mellitus with circulatory disorder, 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       Assessment & Plan    -Her gastrointestinal symptoms have improved significantly.  CT scan of the abdomen pelvis did not reveal any significant acute findings.  I think her symptoms are due to the rotavirus and that she is a carrier of C. difficile but not with active infection.  I am going  to stop the oral vancomycin and we will observe.  -Patient had an episode of chest pain with shortness of breath this morning.  Troponin mildly elevated.  Asked cardiology to evaluate.  Check chest x-ray.  -Myeloproliferative disorder noted.  White count about about what it was 3 weeks ago.  -A-fib is rate controlled.  On Eliquis and Plavix  -Acute kidney injury now resolved.  No obstructive process on abdominal CT.  Continue supportive IV fluids until oral intake improves  -We will start her on some oral sodium bicarb  -Monitor and replace potassium/magnesium as needed     Dispo  To be determined.  Anticipate she will need several days more.      Expected Discharge Date: 5/8/2023; Expected Discharge Time:      Rei Johnson MD  [facility]  05/05/23  12:24 EDT

## 2023-05-05 NOTE — PLAN OF CARE
Goal Outcome Evaluation:  Plan of Care Reviewed With: patient        Progress: improving  Outcome Evaluation: Skin care done after each incontinent. Weight shifted/turned as mush as we could. Contact spore precaution maintained.  VSS, will continue to monitor.

## 2023-05-05 NOTE — PAYOR COMM NOTE
"Maribell Boyer (83 y.o. Female)       INPATIENT REQUEST FOR [id number]    CONTACT ZULEYKA HERNANDEZ  P# [phone]  F# [phone]          Date of Birth   1940    Social Security Number       Address   [address] [place] [address] [address] [place] [address]    Home Phone   [phone]    MRN   [id number]       Christian   Unknown    Marital Status                               Admission Date   5/3/23    Admission Type   Emergency    Admitting Provider   Jaimee Macias MD    Attending Provider   Meg Johnson MD    Department, Room/Bed   [address], [address]/1       Discharge Date       Discharge Disposition       Discharge Destination                               Attending Provider: Meg Johnson MD    Allergies: Aspirin, Oxycodone, Hydroxyurea, Diphenhydramine Hcl    Isolation: Spore, Contact   Infection: C.difficile (05/03/23), Rotavirus (05/03/23)   Code Status: No CPR    Ht: 154.9 cm (61\")   Wt: 63.4 kg (139 lb 12.4 oz)    Admission Cmt: None   Principal Problem: Rotavirus enteritis [A08.0]                 Active Insurance as of 5/3/2023     Primary Coverage     Payor Plan Insurance Group Employer/Plan Group    AETNA MEDICARE REPLACEMENT AETNA MEDICARE REPLACEMENT [id number]     Payor Plan Address Payor Plan Phone Number Payor Plan Fax Number Effective Dates    [address] [phone]  1/1/2022 - None Entered    [facility] [id number]       Subscriber Name Subscriber Birth Date Member ID       DARLENE BOYERCHARY WATERS 1940 [id number]                 Emergency Contacts      (Rel.) Home Phone Work Phone Mobile Phone    MONIKRAY (Daughter) [phone] -- [phone]    LidaKaren munoz (Daughter) -- -- [phone]    Greg Boyer (Son) -- -- [phone]    Meg Boyer (Brother) [phone] -- --    MEG BANKS (Brother) -- -- [phone]               History & Physical      Jaimee Macias MD at " 23          HISTORY AND PHYSICAL   [facility]        Date of Admission: 5/3/2023  Patient Identification:  Name: Catherine Boyer  Age: 83 y.o.  Sex: female  :  1940  MRN: [id number]                     Primary Care Physician: Kristi Moreua APRN    Chief Complaint:  83 year old female who presented to the emergency room with nausea, vomiting and diarrhea which started five days ago; she has had some weight loss; she has not been eating well since she had a cholecystectomy a few weeks ago; she was seen by her pcp and told to go to the ED;     History of Present Illness:   As above    Past Medical History:  Past Medical History:   Diagnosis Date   • Atherosclerosis of abdominal aorta 2023   • Atrial fibrillation    • Breast cancer    • CAD (coronary artery disease)     NSTEMI 2022: 90% ostial LAD, 99% D1, 70% mid-distal LAD (medical therapy). She received two stents (2.5x18 and 2.5x26mm [place] LEFTY) but I don't know which one went to which lesion.   • Carotid atherosclerosis    • Chronic diastolic (congestive) heart failure    • COVID 10/29/2022   • GERD (gastroesophageal reflux disease)    • Glaucoma    • History of cataract    • Hypertension    • Microcytic anemia     per Dr. oleg pate office  note 22-dd   • Myeloproliferative disorder     JAK2 positive   • Nonbacterial thrombotic endocarditis     2021: 4x5mm vegetation on the ventricular surface of the anterior MV, negative blood cultures   • Porcelain gallbladder    • PUD (peptic ulcer disease)    • TIA (transient ischemic attack)    • Type 2 diabetes mellitus    • Type 2 diabetes mellitus with circulatory disorder, without long-term current use of insulin 2022   • Upper GI bleed      Past Surgical History:  Past Surgical History:   Procedure Laterality Date   • BREAST LUMPECTOMY     • CARDIAC CATHETERIZATION N/A 2022    Procedure: Coronary angiography;  Surgeon: Guero Verde MD;  Location: [facility]  CATH INVASIVE LOCATION;  Service: Cardiovascular;  Laterality: N/A;   • CARDIAC CATHETERIZATION N/A 09/02/2022    Procedure: Stent LEFTY coronary;  Surgeon: Guero Verde MD;  Location: [facility] CATH INVASIVE LOCATION;  Service: Cardiovascular;  Laterality: N/A;   • CATARACT EXTRACTION  2011   • CHOLECYSTECTOMY     • CHOLECYSTECTOMY WITH INTRAOPERATIVE CHOLANGIOGRAM N/A 11/28/2022    Procedure: CHOLECYSTECTOMY LAPAROSCOPIC INTRAOPERATIVE CHOLANGIOGRAM;  Surgeon: Dani Grover Jr., MD;  Location: [facility] MAIN OR;  Service: General;  Laterality: N/A;   • COLONOSCOPY N/A 02/09/2023    Procedure: COLONOSCOPY TO CECUM & T.I.;  Surgeon: Guero Ferris MD;  Location: [facility] ENDOSCOPY;  Service: Gastroenterology;  Laterality: N/A;  PRE- MELENA, GI BLEED  POST- DIVERTICULOSIS, INT HEMORRHOIDS   • ENDOSCOPY N/A 01/25/2023    Procedure: ESOPHAGOGASTRODUODENOSCOPY WITH BIOPSIES;  Surgeon: Charles Diaz MD;  Location: [facility] ENDOSCOPY;  Service: Gastroenterology;  Laterality: N/A;  pre: abd pain, nausea  post: hiatal hernia, mild gastritis, sloughing of the esophagus   • ENTEROSCOPY SMALL BOWEL N/A 02/09/2023    Procedure: ENTEROSCOPY SMALL BOWEL;  Surgeon: Guero Ferris MD;  Location: [facility] ENDOSCOPY;  Service: Gastroenterology;  Laterality: N/A;  PRE- MELENA, GI BLEED  POST- HIATAL HERNIA   • JOINT REPLACEMENT     • UPPER GASTROINTESTINAL ENDOSCOPY        Home Meds:  Medications Prior to Admission   Medication Sig Dispense Refill Last Dose   • acetaminophen (TYLENOL) 500 MG tablet Take 1 tablet by mouth As Needed.      • apixaban (ELIQUIS) 5 MG tablet tablet Take 1 tablet by mouth 2 (Two) Times a Day. 180 tablet 3    • atorvastatin (LIPITOR) 80 MG tablet Take 1 tablet by mouth Every Night. 90 tablet 3    • clopidogrel (PLAVIX) 75 MG tablet TAKE 1 TABLET DAILY (Patient taking differently: Take 1 tablet by mouth Daily.) 30 tablet 11    • empagliflozin (JARDIANCE) 10 MG tablet tablet Take 1 tablet by mouth Daily. 90  tablet 3    • esomeprazole (nexIUM) 40 MG capsule Take 1 capsule by mouth Every Morning Before Breakfast.      • furosemide (LASIX) 40 MG tablet Take 1 tablet by mouth Daily. 90 tablet 3    • Jakafi 15 MG chemo tablet Take 1 tablet by mouth 2 (Two) Times a Day. 60 tablet 3    • latanoprost (XALATAN) 0.005 % ophthalmic solution Administer 1 drop to both eyes.      • lisinopril (PRINIVIL,ZESTRIL) 2.5 MG tablet Take 1 tablet by mouth Daily.      • metoprolol succinate XL (TOPROL-XL) 25 MG 24 hr tablet Take 1 tablet by mouth 2 (Two) Times a Day. 20 tablet 0    • pantoprazole (PROTONIX) 40 MG EC tablet Take 1 tablet by mouth Daily. 90 tablet 1    • sertraline (ZOLOFT) 100 MG tablet Take 1 tablet by mouth Daily.      • sucralfate (CARAFATE) 1 g tablet Take 1 tablet by mouth 4 (Four) Times a Day.      • ondansetron (Zofran) 4 MG tablet Take 1 tablet by mouth Every 8 (Eight) Hours As Needed for Nausea or Vomiting. 90 tablet 0    • simethicone (MYLICON) 80 MG chewable tablet Chew 1 tablet by mouth 4 (Four) Times a Day As Needed for Flatulence. 100 tablet 0    • traZODone (DESYREL) 50 MG tablet Take 25-50 mg by mouth every night at bedtime.          Allergies:  Allergies   Allergen Reactions   • Aspirin Unknown - Low Severity   • Oxycodone Unknown - Low Severity   • Hydroxyurea Other (See Comments)     Her hemoglobin drop and was stop in February 2022 and start taking Jakafi   • Diphenhydramine Hcl Anxiety and Unknown (See Comments)     Benadryl IVP     Immunizations:  Immunization History   Administered Date(s) Administered   • Pneumococcal Conjugate 13-Valent (PCV13) 07/08/2021     Social History:   Social History     Social History Narrative   • Not on file     Social History     Socioeconomic History   • Marital status:    Tobacco Use   • Smoking status: Former     Packs/day: 1.00     Years: 20.00     Pack years: 20.00     Types: Cigarettes   • Smokeless tobacco: Never   • Tobacco comments:     30  years ago  "  Vaping Use   • Vaping Use: Never used   Substance and Sexual Activity   • Alcohol use: Yes     Comment: \"rare\"   • Drug use: Never   • Sexual activity: Defer       Family History:  Family History   Problem Relation Age of Onset   • Ovarian cancer Mother    • Lung cancer Father    • Lung cancer Sister    • Malig Hyperthermia Neg Hx         Review of Systems  See history of present illness and past medical history.  Patient denies headache, dizziness, syncope, falls, trauma, change in vision, change in hearing,  focal weakness, numbness, or paresthesia.  Patient denies chest pain, palpitations, dyspnea, orthopnea, PND, cough, sinus pressure, rhinorrhea, epistaxis, hemoptysis,  hematemesis,  constipation or hematchezia.  Denies cold or heat intolerance, polydipsia, polyuria, polyphagia. Denies hematuria, pyuria, dysuria, hesitancy, frequency or urgency. Denies consumption of raw and under cooked meats foods or change in water source.  Denies fever, chills, sweats, night sweats.  Denies missing any routine medications. Remainder of ROS is negative.    Objective:  T Max 24 hrs: Temp (24hrs), Av.3 °F (36.8 °C), Min:98 °F (36.7 °C), Max:98.6 °F (37 °C)    Vitals Ranges:   Temp:  [98 °F (36.7 °C)-98.6 °F (37 °C)] 98.6 °F (37 °C)  Heart Rate:  [73-89] 73  Resp:  [16-18] 16  BP: ()/(49-58) 104/49      Exam:  /49 (BP Location: Left arm, Patient Position: Lying)   Pulse 73   Temp 98.6 °F (37 °C) (Oral)   Resp 16   Ht 154.9 cm (61\")   Wt 53.5 kg (118 lb)   SpO2 93%   BMI 22.30 kg/m²     General Appearance:    Alert, cooperative, no distress, appears stated age   Head:    Normocephalic, without obvious abnormality, atraumatic   Eyes:    PERRL, conjunctivae/corneas clear, EOM's intact, both eyes   Ears:    Normal external ear canals, both ears   Nose:   Nares normal, septum midline, mucosa normal, no drainage    or sinus tenderness   Throat:   Lips, mucosa, and tongue normal   Neck:   Supple, symmetrical, " trachea midline, no adenopathy;     thyroid:  no enlargement/tenderness/nodules; no carotid    bruit or JVD   Back:     Symmetric, no curvature, ROM normal, no CVA tenderness   Lungs:     Decreased breath sounds bilaterally, respirations unlabored   Chest Wall:    No tenderness or deformity    Heart:    Regular rate and rhythm, S1 and S2 normal, no murmur, rub   or gallop   Abdomen:     Soft, nontender, bowel sounds active all four quadrants,     no masses, no hepatomegaly, no splenomegaly   Extremities:   Extremities normal, atraumatic, no cyanosis or edema   Pulses:   2+ and symmetric all extremities   Skin:   Skin color, texture, turgor normal, no rashes or lesions               .    Data Review:  Labs in chart were reviewed.  WBC   Date Value Ref Range Status   05/03/2023 46.50 (C) 3.40 - 10.80 10*3/mm3 Final     Hemoglobin   Date Value Ref Range Status   05/03/2023 11.4 (L) 12.0 - 15.9 g/dL Final     Hematocrit   Date Value Ref Range Status   05/03/2023 34.3 34.0 - 46.6 % Final     Platelets   Date Value Ref Range Status   05/03/2023 276 140 - 450 10*3/mm3 Final     Sodium   Date Value Ref Range Status   05/03/2023 135 (L) 136 - 145 mmol/L Final     Potassium   Date Value Ref Range Status   05/03/2023 3.1 (L) 3.5 - 5.2 mmol/L Final     Chloride   Date Value Ref Range Status   05/03/2023 100 98 - 107 mmol/L Final     CO2   Date Value Ref Range Status   05/03/2023 20.0 (L) 22.0 - 29.0 mmol/L Final     BUN   Date Value Ref Range Status   05/03/2023 43 (H) 8 - 23 mg/dL Final     Creatinine   Date Value Ref Range Status   05/03/2023 1.80 (H) 0.57 - 1.00 mg/dL Final     Glucose   Date Value Ref Range Status   05/03/2023 97 65 - 99 mg/dL Final     Calcium   Date Value Ref Range Status   05/03/2023 8.3 (L) 8.6 - 10.5 mg/dL Final     AST (SGOT)   Date Value Ref Range Status   05/03/2023 19 1 - 32 U/L Final     ALT (SGPT)   Date Value Ref Range Status   05/03/2023 25 1 - 33 U/L Final     Alkaline Phosphatase   Date Value  "Ref Range Status   2023 282 (H) 39 - 117 U/L Final                Imaging Results (All)     None            Assessment:  Active Hospital Problems    Diagnosis  POA   • **Acute renal failure, unspecified acute renal failure type [N17.9]  Yes      Resolved Hospital Problems   No resolved problems to display.   hypokalemia  Anemia  A fib  Cad  hypertension  Diabetes  Chronic diastolic chf  Hypokalemia  Weight loss  Myeloproliferative disorder    Plan:  Will replace potassium  Iv fluids  Antiemetics  Monitor on telemetry  accu checks, sliding scale  Trend blood counts  Dw. Patient and ED provider    Jaimee Macias MD  5/3/2023  20:55 EDT      Electronically signed by Jaimee Macias MD at 23 2101          Physician Progress Notes (last 72 hours)      Rei Johnson MD at 23 1159           LOS: 0 days     Name: Catherine Boyer  Age: 83 y.o.  Sex: female  :  1940  MRN: [id number]         Primary Care Physician: Kristi Moreau APRN    Subjective   Subjective  Patient has been vomiting all morning.  Endorses mild epigastric abdominal pain.  Diarrhea last night but none this morning.  Denies fevers or chills.    Objective   Vital Signs  Temp:  [98 °F (36.7 °C)-98.6 °F (37 °C)] 98.1 °F (36.7 °C)  Heart Rate:  [73-89] 76  Resp:  [16-18] 16  BP: ()/(49-58) 123/52  Body mass index is 25.49 kg/m².    Objective:  General Appearance:  Comfortable and in no acute distress (Elderly and frail-appearing).    Vital signs: (most recent): Blood pressure 123/52, pulse 76, temperature 98.1 °F (36.7 °C), temperature source Oral, resp. rate 16, height 154.9 cm (61\"), weight 61.2 kg (134 lb 14.7 oz), SpO2 100 %.    Lungs:  Normal effort and normal respiratory rate.  She is not in respiratory distress.  There are decreased breath sounds.    Heart: Normal rate.  Regular rhythm.    Abdomen: Abdomen is soft.  Bowel sounds are normal.   There is epigastric tenderness.    Extremities: There is " no dependent edema or local swelling.    Neurological: Patient is alert and oriented to person, place and time.    Skin:  Warm and dry.              Results Review:       I reviewed the patient's new clinical results.    Results from last 7 days   Lab Units 05/04/23  0350 05/03/23  1534   WBC 10*3/mm3 36.70* 46.50*   HEMOGLOBIN g/dL 10.7* 11.4*   PLATELETS 10*3/mm3 239 276     Results from last 7 days   Lab Units 05/04/23  0350 05/03/23  1534   SODIUM mmol/L 138 135*   POTASSIUM mmol/L 3.4* 3.1*   CHLORIDE mmol/L 107 100   CO2 mmol/L 17.5* 20.0*   BUN mg/dL 42* 43*   CREATININE mg/dL 1.42* 1.80*   CALCIUM mg/dL 8.0* 8.3*   GLUCOSE mg/dL 77 97                 Scheduled Meds:   apixaban, 5 mg, Oral, BID  atorvastatin, 80 mg, Oral, Nightly  clopidogrel, 75 mg, Oral, Daily  famotidine, 20 mg, Oral, Daily  hydrocortisone-bacitracin-zinc oxide-nystatin, 1 application, Topical, BID  latanoprost, 1 drop, Both Eyes, Nightly  metoprolol succinate XL, 25 mg, Oral, BID  Petrolatum, 1 application, Topical, Q24H  ruxolitinib, 15 mg, Oral, BID  sertraline, 100 mg, Oral, Daily  vancomycin, 125 mg, Oral, Q6H      PRN Meds:   •  acetaminophen  •  melatonin  •  ondansetron **OR** ondansetron  •  Pharmacy Consult  •  sodium chloride  Continuous Infusions:  Pharmacy Consult,   sodium chloride 0.9 % with KCl 20 mEq, 100 mL/hr, Last Rate: 100 mL/hr (05/04/23 0920)        Assessment & Plan   Active Hospital Problems    Diagnosis  POA   • Rotavirus enteritis [A08.0]  Unknown   • KARLI (acute kidney injury) [N17.9]  Unknown   • Epigastric abdominal pain [R10.13]  Yes   • Chronic diastolic (congestive) heart failure [I50.32]  Yes   • Hypertension [I10]  Yes   • CAD (coronary artery disease) [I25.10]  Yes   • Myeloproliferative disorder [D47.1]  Yes   • Paroxysmal atrial fibrillation [I48.0]  Yes   • Type 2 diabetes mellitus with circulatory disorder, without long-term current use of insulin [E11.59]  Yes      Resolved Hospital Problems   No  resolved problems to display.       Assessment & Plan    -Supportive care for rotavirus enteritis.  Continue IV fluids.  Add Compazine which can be alternated with the Zofran for her ongoing vomiting.  Gentle pain medication  -Going for CT scan of the abdomen pelvis this morning.  Await results  -She is C. difficile toxin positive but antigen negative.  Likely representative of colonization.  She was started on oral vancomycin overnight last night.  I am going to leave this in place until we see what things look like on her CT.  -Myeloproliferative disorder noted.  White count about half of what it was 3 weeks ago.  -A-fib is rate controlled.  On Eliquis and Plavix  -Renal function improving with IV fluids.  We will make sure there is no obstructive process on CT.  -Replace potassium today and check magnesium.    Dispo  To be determined.  Anticipate she will need several days more.    Expected discharge date/ time has not been documented.     Rei Johnson MD  [facility]  05/04/23  11:59 EDT      Electronically signed by Rei Johnson MD at 05/04/23 1204       Consult Notes (last 72 hours)  Notes from 05/02/23 1111 through 05/05/23 1111   No notes of this type exist for this encounter.

## 2023-05-06 ENCOUNTER — APPOINTMENT (OUTPATIENT)
Dept: CARDIOLOGY | Facility: HOSPITAL | Age: 83
End: 2023-05-06
Payer: MEDICARE

## 2023-05-06 LAB
ANION GAP SERPL CALCULATED.3IONS-SCNC: 6.8 MMOL/L (ref 5–15)
AORTIC DIMENSIONLESS INDEX: 0.8 (DI)
BH CV ECHO MEAS - AO MAX PG: 16.3 MMHG
BH CV ECHO MEAS - AO MEAN PG: 9 MMHG
BH CV ECHO MEAS - AO V2 MAX: 202 CM/SEC
BH CV ECHO MEAS - AO V2 VTI: 42.9 CM
BH CV ECHO MEAS - AVA(I,D): 2.43 CM2
BH CV ECHO MEAS - EDV(CUBED): 125 ML
BH CV ECHO MEAS - EDV(MOD-SP2): 129 ML
BH CV ECHO MEAS - EDV(MOD-SP4): 103 ML
BH CV ECHO MEAS - EF(MOD-BP): 42.6 %
BH CV ECHO MEAS - EF(MOD-SP2): 38.8 %
BH CV ECHO MEAS - EF(MOD-SP4): 46.6 %
BH CV ECHO MEAS - ESV(CUBED): 35.3 ML
BH CV ECHO MEAS - ESV(MOD-SP2): 79 ML
BH CV ECHO MEAS - ESV(MOD-SP4): 55 ML
BH CV ECHO MEAS - FS: 34.4 %
BH CV ECHO MEAS - IVS/LVPW: 1.33 CM
BH CV ECHO MEAS - IVSD: 1.2 CM
BH CV ECHO MEAS - LAT PEAK E' VEL: 12.2 CM/SEC
BH CV ECHO MEAS - LV DIASTOLIC VOL/BSA (35-75): 63.9 CM2
BH CV ECHO MEAS - LV MASS(C)D: 194.4 GRAMS
BH CV ECHO MEAS - LV MAX PG: 8.8 MMHG
BH CV ECHO MEAS - LV MEAN PG: 5 MMHG
BH CV ECHO MEAS - LV SYSTOLIC VOL/BSA (12-30): 34.1 CM2
BH CV ECHO MEAS - LV V1 MAX: 148 CM/SEC
BH CV ECHO MEAS - LV V1 VTI: 26.8 CM
BH CV ECHO MEAS - LVIDD: 5 CM
BH CV ECHO MEAS - LVIDS: 3.3 CM
BH CV ECHO MEAS - LVOT AREA: 3.9 CM2
BH CV ECHO MEAS - LVOT DIAM: 2.23 CM
BH CV ECHO MEAS - LVPWD: 0.9 CM
BH CV ECHO MEAS - MED PEAK E' VEL: 4.6 CM/SEC
BH CV ECHO MEAS - MR MAX PG: 90.5 MMHG
BH CV ECHO MEAS - MR MAX VEL: 475.6 CM/SEC
BH CV ECHO MEAS - MR MEAN PG: 67.1 MMHG
BH CV ECHO MEAS - MR MEAN VEL: 396.1 CM/SEC
BH CV ECHO MEAS - MR VTI: 146.4 CM
BH CV ECHO MEAS - MV A MAX VEL: 130 CM/SEC
BH CV ECHO MEAS - MV DEC SLOPE: 796.6 CM/SEC2
BH CV ECHO MEAS - MV DEC TIME: 0.18 MSEC
BH CV ECHO MEAS - MV E MAX VEL: 134 CM/SEC
BH CV ECHO MEAS - MV E/A: 1.03
BH CV ECHO MEAS - MV MAX PG: 8.2 MMHG
BH CV ECHO MEAS - MV MEAN PG: 4.6 MMHG
BH CV ECHO MEAS - MV P1/2T: 55.5 MSEC
BH CV ECHO MEAS - MV V2 VTI: 35.2 CM
BH CV ECHO MEAS - MVA(P1/2T): 4 CM2
BH CV ECHO MEAS - MVA(VTI): 3 CM2
BH CV ECHO MEAS - PA ACC TIME: 0.09 SEC
BH CV ECHO MEAS - PA PR(ACCEL): 39.8 MMHG
BH CV ECHO MEAS - PA V2 MAX: 90.3 CM/SEC
BH CV ECHO MEAS - PULM DIAS VEL: 53.3 CM/SEC
BH CV ECHO MEAS - PULM S/D: 1.26
BH CV ECHO MEAS - PULM SYS VEL: 67.1 CM/SEC
BH CV ECHO MEAS - QP/QS: 0.88
BH CV ECHO MEAS - RAP SYSTOLE: 8 MMHG
BH CV ECHO MEAS - RV MAX PG: 1.7 MMHG
BH CV ECHO MEAS - RV V1 MAX: 65.1 CM/SEC
BH CV ECHO MEAS - RV V1 VTI: 14.8 CM
BH CV ECHO MEAS - RVOT DIAM: 2.8 CM
BH CV ECHO MEAS - RVSP: 62.1 MMHG
BH CV ECHO MEAS - SI(MOD-SP2): 31 ML/M2
BH CV ECHO MEAS - SI(MOD-SP4): 29.8 ML/M2
BH CV ECHO MEAS - SV(LVOT): 104.2 ML
BH CV ECHO MEAS - SV(MOD-SP2): 50 ML
BH CV ECHO MEAS - SV(MOD-SP4): 48 ML
BH CV ECHO MEAS - SV(RVOT): 91.3 ML
BH CV ECHO MEAS - TR MAX PG: 54.1 MMHG
BH CV ECHO MEAS - TR MAX VEL: 367.7 CM/SEC
BH CV ECHO MEASUREMENTS AVERAGE E/E' RATIO: 15.95
BH CV XLRA - RV BASE: 3.1 CM
BH CV XLRA - RV LENGTH: 7 CM
BH CV XLRA - TDI S': 10.6 CM/SEC
BUN SERPL-MCNC: 16 MG/DL (ref 8–23)
BUN/CREAT SERPL: 24.6 (ref 7–25)
CALCIUM SPEC-SCNC: 8.2 MG/DL (ref 8.6–10.5)
CHLORIDE SERPL-SCNC: 112 MMOL/L (ref 98–107)
CO2 SERPL-SCNC: 20.2 MMOL/L (ref 22–29)
CREAT SERPL-MCNC: 0.65 MG/DL (ref 0.57–1)
DEPRECATED RDW RBC AUTO: 55.1 FL (ref 37–54)
EGFRCR SERPLBLD CKD-EPI 2021: 86.4 ML/MIN/1.73
ERYTHROCYTE [DISTWIDTH] IN BLOOD BY AUTOMATED COUNT: 16.4 % (ref 12.3–15.4)
GLUCOSE SERPL-MCNC: 88 MG/DL (ref 65–99)
HCT VFR BLD AUTO: 27.1 % (ref 34–46.6)
HGB BLD-MCNC: 9 G/DL (ref 12–15.9)
LEFT ATRIUM VOLUME INDEX: 43.1 ML/M2
MAXIMAL PREDICTED HEART RATE: 135 BPM
MCH RBC QN AUTO: 31.1 PG (ref 26.6–33)
MCHC RBC AUTO-ENTMCNC: 33.2 G/DL (ref 31.5–35.7)
MCV RBC AUTO: 93.8 FL (ref 79–97)
PLATELET # BLD AUTO: 149 10*3/MM3 (ref 140–450)
PMV BLD AUTO: 11.4 FL (ref 6–12)
POTASSIUM SERPL-SCNC: 4.4 MMOL/L (ref 3.5–5.2)
RBC # BLD AUTO: 2.89 10*6/MM3 (ref 3.77–5.28)
SINUS: 3.1 CM
SODIUM SERPL-SCNC: 139 MMOL/L (ref 136–145)
STRESS TARGET HR: 115 BPM
WBC NRBC COR # BLD: 39.61 10*3/MM3 (ref 3.4–10.8)

## 2023-05-06 PROCEDURE — 63710000001 RUXOLITINIB 15 MG TABLET: Performed by: INTERNAL MEDICINE

## 2023-05-06 PROCEDURE — 25510000001 PERFLUTREN (DEFINITY) 8.476 MG IN SODIUM CHLORIDE (PF) 0.9 % 10 ML INJECTION: Performed by: INTERNAL MEDICINE

## 2023-05-06 PROCEDURE — 80048 BASIC METABOLIC PNL TOTAL CA: CPT | Performed by: INTERNAL MEDICINE

## 2023-05-06 PROCEDURE — 99231 SBSQ HOSP IP/OBS SF/LOW 25: CPT | Performed by: NURSE PRACTITIONER

## 2023-05-06 PROCEDURE — 93306 TTE W/DOPPLER COMPLETE: CPT | Performed by: INTERNAL MEDICINE

## 2023-05-06 PROCEDURE — 25010000002 PROCHLORPERAZINE 10 MG/2ML SOLUTION: Performed by: INTERNAL MEDICINE

## 2023-05-06 PROCEDURE — 85027 COMPLETE CBC AUTOMATED: CPT | Performed by: INTERNAL MEDICINE

## 2023-05-06 PROCEDURE — C9399 UNCLASSIFIED DRUGS OR BIOLOG: HCPCS | Performed by: INTERNAL MEDICINE

## 2023-05-06 PROCEDURE — 93306 TTE W/DOPPLER COMPLETE: CPT

## 2023-05-06 PROCEDURE — 25010000002 ONDANSETRON PER 1 MG: Performed by: INTERNAL MEDICINE

## 2023-05-06 RX ORDER — SACCHAROMYCES BOULARDII 250 MG
250 CAPSULE ORAL 2 TIMES DAILY
Status: DISCONTINUED | OUTPATIENT
Start: 2023-05-06 | End: 2023-05-16 | Stop reason: HOSPADM

## 2023-05-06 RX ADMIN — METOPROLOL SUCCINATE 25 MG: 25 TABLET, EXTENDED RELEASE ORAL at 08:20

## 2023-05-06 RX ADMIN — LATANOPROST 1 DROP: 50 SOLUTION OPHTHALMIC at 21:50

## 2023-05-06 RX ADMIN — RUXOLITINIB 15 MG: 15 TABLET ORAL at 21:48

## 2023-05-06 RX ADMIN — SODIUM BICARBONATE 650 MG: 650 TABLET ORAL at 21:50

## 2023-05-06 RX ADMIN — METOPROLOL SUCCINATE 25 MG: 25 TABLET, EXTENDED RELEASE ORAL at 21:49

## 2023-05-06 RX ADMIN — APIXABAN 5 MG: 5 TABLET, FILM COATED ORAL at 08:20

## 2023-05-06 RX ADMIN — SERTRALINE 100 MG: 100 TABLET, FILM COATED ORAL at 08:20

## 2023-05-06 RX ADMIN — SODIUM BICARBONATE 650 MG: 650 TABLET ORAL at 18:00

## 2023-05-06 RX ADMIN — SODIUM BICARBONATE 650 MG: 650 TABLET ORAL at 08:20

## 2023-05-06 RX ADMIN — ATORVASTATIN CALCIUM 80 MG: 80 TABLET, FILM COATED ORAL at 21:48

## 2023-05-06 RX ADMIN — PROCHLORPERAZINE EDISYLATE 10 MG: 5 INJECTION INTRAMUSCULAR; INTRAVENOUS at 12:18

## 2023-05-06 RX ADMIN — SODIUM CHLORIDE 75 ML/HR: 9 INJECTION, SOLUTION INTRAVENOUS at 12:18

## 2023-05-06 RX ADMIN — ACETAMINOPHEN 650 MG: 325 TABLET, FILM COATED ORAL at 21:49

## 2023-05-06 RX ADMIN — RUXOLITINIB 15 MG: 15 TABLET ORAL at 11:03

## 2023-05-06 RX ADMIN — APIXABAN 5 MG: 5 TABLET, FILM COATED ORAL at 21:49

## 2023-05-06 RX ADMIN — ZINC OXIDE 1 APPLICATION: 200 OINTMENT TOPICAL at 08:22

## 2023-05-06 RX ADMIN — CLOPIDOGREL BISULFATE 75 MG: 75 TABLET, FILM COATED ORAL at 08:20

## 2023-05-06 RX ADMIN — PERFLUTREN 2 ML: 6.52 INJECTION, SUSPENSION INTRAVENOUS at 10:51

## 2023-05-06 RX ADMIN — FAMOTIDINE 20 MG: 20 TABLET, FILM COATED ORAL at 08:20

## 2023-05-06 RX ADMIN — ZINC OXIDE 1 APPLICATION: 200 OINTMENT TOPICAL at 21:50

## 2023-05-06 RX ADMIN — ONDANSETRON 4 MG: 2 INJECTION INTRAMUSCULAR; INTRAVENOUS at 14:24

## 2023-05-06 RX ADMIN — Medication 250 MG: at 21:50

## 2023-05-06 RX ADMIN — PETROLATUM 1 APPLICATION: 420 OINTMENT TOPICAL at 08:22

## 2023-05-06 NOTE — PLAN OF CARE
Goal Outcome Evaluation: Excoriated mirtha area, magic cream and barrier paste applied. No brief used. Purewick in place.  IVFs continue. C/O nausea throughout the day. Moderate improvement with compazine and zofran.  Family visited.  Sister in law passed away last night, patient made aware today.  Appropriate coping.

## 2023-05-06 NOTE — NURSING NOTE
Pt admitted for Nausea, vomiting and diarrhea 05/03/2023 and they found out pt had rotavirus. A & O X4. NC 2.5 L. Pt had chest pain yesterday - EKG  Ordered and didn't show any change and troponin the first one 32 then 29. Cardiology ordered Echo and they didn't do still. Continue IVF runs 75 ml/hr. Wound care completed as order. Plan pt evaluation remains pending to [facility] [place] and  follow up Monday on bed availability. Safety maintained . Will continue monitor.

## 2023-05-06 NOTE — PROGRESS NOTES
"[facility]    Patient Name: Catherine Boyer  :1940  83 y.o.  LOS: 2  Encounter Provider: ABEL Daniels      Patient Care Team:  Kristi Moreau APRN as PCP - General (Nurse Practitioner)  Gerard Foreman MD as Referring Physician (Medical Oncology)  Darrell Reinoso MD as Consulting Physician (Hematology and Oncology)  Albin Barriga MD as Cardiologist (Cardiology)  Richard Aldana RN as     Chief Complaint: Follow-up chest pain, elevated troponin    Interval History: Echocardiogram pending.  Patient reports improvement of shortness of breath.  Denies chest pain.  Reports improvement of nausea, vomiting, diarrhea.  She reports that her appetite is better and she is able to tolerate oral foods.       Objective   Vital Signs  Temp:  [97.6 °F (36.4 °C)-98 °F (36.7 °C)] 97.8 °F (36.6 °C)  Heart Rate:  [75-89] 75  Resp:  [18] 18  BP: ()/(42-67) 114/67    Intake/Output Summary (Last 24 hours) at 2023 1114  Last data filed at 2023 0543  Gross per 24 hour   Intake 240 ml   Output 450 ml   Net -210 ml     Flowsheet Rows    Flowsheet Row First Filed Value   Admission Height 154.9 cm (61\") Documented at 2023 1540   Admission Weight 53.5 kg (118 lb) Documented at 2023 1540            Vitals and nursing note reviewed.   Constitutional:       Appearance: Normal appearance. Well-developed.   Eyes:      Conjunctiva/sclera: Conjunctivae normal.   Neck:      Vascular: No carotid bruit.   Pulmonary:      Breath sounds: Normal breath sounds.   Cardiovascular:      Normal rate. Regular rhythm. Normal S1 with normal intensity. Normal S2 with normal intensity.      Murmurs: There is no murmur.      No gallop. No click. No rub.   Musculoskeletal: Normal range of motion. Skin:     General: Skin is warm and dry.   Neurological:      Mental Status: Alert and oriented to person, place, and time.      GCS: GCS eye subscore is 4. GCS verbal subscore is 5. GCS motor subscore " is 6.   Psychiatric:         Speech: Speech normal.         Behavior: Behavior normal.         Thought Content: Thought content normal.         Judgment: Judgment normal.           Pertinent Test Results:  Results from last 7 days   Lab Units 05/06/23  0419 05/05/23 0524 05/04/23  2154 05/04/23  0350 05/03/23  1534   SODIUM mmol/L 139 140  --  138 135*   POTASSIUM mmol/L 4.4 5.1 4.7 3.4* 3.1*   CHLORIDE mmol/L 112* 113*  --  107 100   CO2 mmol/L 20.2* 15.6*  --  17.5* 20.0*   BUN mg/dL 16 27*  --  42* 43*   CREATININE mg/dL 0.65 0.75  --  1.42* 1.80*   GLUCOSE mg/dL 88 105*  --  77 97   CALCIUM mg/dL 8.2* 8.6  --  8.0* 8.3*   AST (SGOT) U/L  --   --   --   --  19   ALT (SGPT) U/L  --   --   --   --  25     Results from last 7 days   Lab Units 05/05/23  1100 05/05/23  0902   HSTROP T ng/L 29* 32*     Results from last 7 days   Lab Units 05/06/23 0419 05/05/23 0524 05/04/23  0350 05/03/23  1534   WBC 10*3/mm3 39.61* 64.55* 36.70* 46.50*   HEMOGLOBIN g/dL 9.0* 10.3* 10.7* 11.4*   HEMATOCRIT % 27.1* 31.3* 32.7* 34.3   PLATELETS 10*3/mm3 149 238 239 276         Results from last 7 days   Lab Units 05/05/23  0902 05/04/23  0350   MAGNESIUM mg/dL 1.7 1.7           Invalid input(s): LDLCALC                Medication Review:   apixaban, 5 mg, Oral, BID  atorvastatin, 80 mg, Oral, Nightly  clopidogrel, 75 mg, Oral, Daily  famotidine, 20 mg, Oral, Daily  hydrocortisone-bacitracin-zinc oxide-nystatin, 1 application, Topical, BID  latanoprost, 1 drop, Both Eyes, Nightly  metoprolol succinate XL, 25 mg, Oral, BID  Petrolatum, 1 application, Topical, Q24H  ruxolitinib, 15 mg, Oral, BID  sertraline, 100 mg, Oral, Daily  sodium bicarbonate, 650 mg, Oral, TID         sodium chloride, 75 mL/hr, Last Rate: 75 mL/hr (05/05/23 1319)        Assessment & Plan     Active Hospital Problems    Diagnosis  POA   • **Rotavirus enteritis [A08.0]  Unknown   • Moderate Malnutrition (HCC) [E44.0]  Yes   • 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 [Z22.1]   Not Applicable   • KARLI (acute kidney injury) [N17.9]  Unknown   • Acute renal failure, unspecified acute renal failure type [N17.9]  Yes   • Epigastric abdominal pain [R10.13]  Yes   • Chronic diastolic (congestive) heart failure [I50.32]  Yes   • Hypertension [I10]  Yes   • CAD (coronary artery disease) [I25.10]  Yes   • Myeloproliferative disorder [D47.1]  Yes   • Paroxysmal atrial fibrillation [I48.0]  Yes   • Type 2 diabetes mellitus with circulatory disorder, 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        1. Rotavirus enteritis with nausea, vomiting, diarrhea  2. KARLI: Likely secondary to #1 above  3. Chest pain with nonspecific troponin elevation  4. Myeloproliferative disorder with significantly elevated WBC  5. Multifactorial anemia  6. CAD with history of LEFTY to the LAD and diagonal in 2/2022.  99% in-stent restenosis of the diagonal stent with placement of new stent September 2022.  Patient also known to have 60% proximal to mid LAD disease and 100%  of the mid RCA with left-to-right collaterals.  7. Akinetic apex with preserved LVEF  8. PAF  9. History of TIA  10. Diabetes    · Heart rate controlled.  Patient anticoagulated for A-fib  · Echocardiogram performed today, results pending  · Reports improvement of dyspnea.  Denies chest pain.      ABEL Daniels  [facility]  05/06/23  11:14 EDT

## 2023-05-06 NOTE — PROGRESS NOTES
Name: Catherine Boyer ADMIT: 5/3/2023   : 1940  PCP: Kristi Moreau APRN    MRN: [id number] LOS: 2 days   AGE/SEX: 83 y.o. female  ROOM: [facility]     Subjective   Subjective   No acute events. Patient is feeling better overall. Taking PO. Having some nausea controlled with antiemetics. No vomiting. Diarrhea is improving. No CP/dyspnea/f/c.    Objective   Objective   Vital Signs  Temp:  [97.8 °F (36.6 °C)-98 °F (36.7 °C)] 97.8 °F (36.6 °C)  Heart Rate:  [75-88] 75  Resp:  [18] 18  BP: ()/(59-67) 114/67  SpO2:  [92 %-95 %] 95 %  on  Flow (L/min):  [2-3] 2;   Device (Oxygen Therapy): nasal cannula  Body mass index is 26.56 kg/m².  Physical Exam  Vitals and nursing note reviewed.   Constitutional:       General: She is not in acute distress.     Appearance: She is not toxic-appearing or diaphoretic.   HENT:      Head: Normocephalic and atraumatic.      Nose: Nose normal.      Mouth/Throat:      Mouth: Mucous membranes are moist.      Pharynx: Oropharynx is clear.   Eyes:      Conjunctiva/sclera: Conjunctivae normal.      Pupils: Pupils are equal, round, and reactive to light.   Cardiovascular:      Rate and Rhythm: Normal rate and regular rhythm.      Pulses: Normal pulses.   Pulmonary:      Effort: Pulmonary effort is normal.      Breath sounds: Normal breath sounds.   Abdominal:      General: Bowel sounds are normal.      Palpations: Abdomen is soft.      Tenderness: There is no abdominal tenderness.   Musculoskeletal:         General: No swelling or tenderness.      Cervical back: Normal range of motion and neck supple.   Skin:     General: Skin is warm and dry.      Capillary Refill: Capillary refill takes less than 2 seconds.   Neurological:      General: No focal deficit present.      Mental Status: She is alert.   Psychiatric:         Mood and Affect: Mood normal.         Behavior: Behavior normal.       Results Review     I reviewed the patient's new clinical results.  Results from last 7 days    Lab Units 05/06/23  0419 05/05/23  0524 05/04/23  0350 05/03/23  1534   WBC 10*3/mm3 39.61* 64.55* 36.70* 46.50*   HEMOGLOBIN g/dL 9.0* 10.3* 10.7* 11.4*   PLATELETS 10*3/mm3 149 238 239 276     Results from last 7 days   Lab Units 05/06/23  0419 05/05/23  0524 05/04/23  2154 05/04/23  0350 05/03/23  1534   SODIUM mmol/L 139 140  --  138 135*   POTASSIUM mmol/L 4.4 5.1 4.7 3.4* 3.1*   CHLORIDE mmol/L 112* 113*  --  107 100   CO2 mmol/L 20.2* 15.6*  --  17.5* 20.0*   BUN mg/dL 16 27*  --  42* 43*   CREATININE mg/dL 0.65 0.75  --  1.42* 1.80*   GLUCOSE mg/dL 88 105*  --  77 97   EGFR mL/min/1.73 86.4 78.1  --  36.3* 27.3*     Results from last 7 days   Lab Units 05/03/23  1534   ALBUMIN g/dL 3.6   BILIRUBIN mg/dL 0.5   ALK PHOS U/L 282*   AST (SGOT) U/L 19   ALT (SGPT) U/L 25     Results from last 7 days   Lab Units 05/06/23 0419 05/05/23  0902 05/05/23 0524 05/04/23  0350 05/03/23  1534   CALCIUM mg/dL 8.2*  --  8.6 8.0* 8.3*   ALBUMIN g/dL  --   --   --   --  3.6   MAGNESIUM mg/dL  --  1.7  --  1.7  --      Results from last 7 days   Lab Units 05/03/23  1534   LACTATE mmol/L 1.8     Glucose   Date/Time Value Ref Range Status   05/04/2023 0923 111 70 - 130 mg/dL Final     Comment:     Meter: [id number] : [id number] Deyanira Saldivar RN       XR Chest PA & Lateral    Result Date: 5/5/2023  Bilateral infiltrates, follow-up recommended.  This report was finalized on 5/5/2023 4:32 PM by Dr. Reilly Ritter M.D.      I have personally reviewed all medications:  Scheduled Medications  apixaban, 5 mg, Oral, BID  atorvastatin, 80 mg, Oral, Nightly  clopidogrel, 75 mg, Oral, Daily  famotidine, 20 mg, Oral, Daily  hydrocortisone-bacitracin-zinc oxide-nystatin, 1 application, Topical, BID  latanoprost, 1 drop, Both Eyes, Nightly  metoprolol succinate XL, 25 mg, Oral, BID  Petrolatum, 1 application, Topical, Q24H  ruxolitinib, 15 mg, Oral, BID  sertraline, 100 mg, Oral, Daily  sodium bicarbonate, 650 mg, Oral,  TID    Infusions  sodium chloride, 75 mL/hr, Last Rate: 75 mL/hr (05/06/23 1218)    Diet  Diet: Liquid Diets; Clear Liquid; Texture: Regular Texture (IDDSI 7); Fluid Consistency: Thin (IDDSI 0)    I have personally reviewed:  [x]  Laboratory   [x]  Microbiology   [x]  Radiology   [x]  EKG/Telemetry  [x]  Cardiology/Vascular   []  Pathology    [x]  Records    Assessment/Plan     Active Hospital Problems    Diagnosis  POA   • **Rotavirus enteritis [A08.0]  Unknown   • Moderate Malnutrition (HCC) [E44.0]  Yes   • 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 [Z22.1]  Not Applicable   • KARLI (acute kidney injury) [N17.9]  Unknown   • Acute renal failure, unspecified acute renal failure type [N17.9]  Yes   • Epigastric abdominal pain [R10.13]  Yes   • Chronic diastolic (congestive) heart failure [I50.32]  Yes   • Hypertension [I10]  Yes   • CAD (coronary artery disease) [I25.10]  Yes   • Myeloproliferative disorder [D47.1]  Yes   • Paroxysmal atrial fibrillation [I48.0]  Yes   • Type 2 diabetes mellitus with circulatory disorder, 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   Rotavirus Enteritis  - seems to be improving  - continue supportive care with antiemetics, SLIV  - advance to full liquid diet  - Cdiff PCR is positive with a negative antigen, indicating colonization rather than infection-no specific treatment for this is indicated  - will start on florastor    KARLI  - secondary to above, resolved with IVF    Chest Pain/Dyspnea/chronic Diastolic CHF/CAD  - CP and dyspnea resolved; troponin elevation flat and non-specific  - echocardiogram noted  - continue on current regimen, monitor volume status  - appreciate cardiology recs    PAF  - rate controlled, continue metoprolol  - continue AC with eliquis    Type 2 DM  - BG acceptable, no need for insulin coverage at this time    Myeloproliferative Disorder  - has chronically elevated WBC    Abnormal UA  - looks  contaminated  - no UTI symptoms and clinically improving without antibiotics  - no specific treatment for this is indicated    Eliquis (home med) for DVT prophylaxis.  DNR.  Discussed with patient and nursing staff.  Anticipate discharge to SNU facility in 1-2 days.      Kenton Meeks MD  [facility]  05/06/23  17:29 EDT

## 2023-05-07 ENCOUNTER — APPOINTMENT (OUTPATIENT)
Dept: CT IMAGING | Facility: HOSPITAL | Age: 83
End: 2023-05-07
Payer: MEDICARE

## 2023-05-07 LAB
ANION GAP SERPL CALCULATED.3IONS-SCNC: 6.7 MMOL/L (ref 5–15)
BASOPHILS # BLD MANUAL: 0.2 10*3/MM3 (ref 0–0.2)
BASOPHILS NFR BLD MANUAL: 1.1 % (ref 0–1.5)
BUN SERPL-MCNC: 12 MG/DL (ref 8–23)
BUN/CREAT SERPL: 23.1 (ref 7–25)
CALCIUM SPEC-SCNC: 8.2 MG/DL (ref 8.6–10.5)
CHLORIDE SERPL-SCNC: 111 MMOL/L (ref 98–107)
CO2 SERPL-SCNC: 22.3 MMOL/L (ref 22–29)
CREAT SERPL-MCNC: 0.52 MG/DL (ref 0.57–1)
DEPRECATED RDW RBC AUTO: 55.5 FL (ref 37–54)
EGFRCR SERPLBLD CKD-EPI 2021: 91.2 ML/MIN/1.73
EOSINOPHIL # BLD MANUAL: 0.2 10*3/MM3 (ref 0–0.4)
EOSINOPHIL NFR BLD MANUAL: 1.1 % (ref 0.3–6.2)
ERYTHROCYTE [DISTWIDTH] IN BLOOD BY AUTOMATED COUNT: 16.4 % (ref 12.3–15.4)
GLUCOSE SERPL-MCNC: 97 MG/DL (ref 65–99)
HCT VFR BLD AUTO: 22.7 % (ref 34–46.6)
HGB BLD-MCNC: 7.6 G/DL (ref 12–15.9)
LYMPHOCYTES # BLD MANUAL: 1.52 10*3/MM3 (ref 0.7–3.1)
LYMPHOCYTES NFR BLD MANUAL: 4.3 % (ref 5–12)
MCH RBC QN AUTO: 31.4 PG (ref 26.6–33)
MCHC RBC AUTO-ENTMCNC: 33.5 G/DL (ref 31.5–35.7)
MCV RBC AUTO: 93.8 FL (ref 79–97)
MONOCYTES # BLD: 0.77 10*3/MM3 (ref 0.1–0.9)
NEUTROPHILS # BLD AUTO: 15.2 10*3/MM3 (ref 1.7–7)
NEUTROPHILS NFR BLD MANUAL: 85.1 % (ref 42.7–76)
NRBC SPEC MANUAL: 1.1 /100 WBC (ref 0–0.2)
PLAT MORPH BLD: NORMAL
PLATELET # BLD AUTO: 129 10*3/MM3 (ref 140–450)
PMV BLD AUTO: 11.9 FL (ref 6–12)
POIKILOCYTOSIS BLD QL SMEAR: ABNORMAL
POTASSIUM SERPL-SCNC: 3.8 MMOL/L (ref 3.5–5.2)
RBC # BLD AUTO: 2.42 10*6/MM3 (ref 3.77–5.28)
SMUDGE CELLS BLD QL SMEAR: ABNORMAL
SODIUM SERPL-SCNC: 140 MMOL/L (ref 136–145)
VARIANT LYMPHS NFR BLD MANUAL: 8.5 % (ref 19.6–45.3)
WBC NRBC COR # BLD: 17.86 10*3/MM3 (ref 3.4–10.8)

## 2023-05-07 PROCEDURE — 71275 CT ANGIOGRAPHY CHEST: CPT

## 2023-05-07 PROCEDURE — 80048 BASIC METABOLIC PNL TOTAL CA: CPT | Performed by: INTERNAL MEDICINE

## 2023-05-07 PROCEDURE — 97162 PT EVAL MOD COMPLEX 30 MIN: CPT

## 2023-05-07 PROCEDURE — C9399 UNCLASSIFIED DRUGS OR BIOLOG: HCPCS | Performed by: INTERNAL MEDICINE

## 2023-05-07 PROCEDURE — 25510000001 IOPAMIDOL PER 1 ML: Performed by: INTERNAL MEDICINE

## 2023-05-07 PROCEDURE — 85025 COMPLETE CBC W/AUTO DIFF WBC: CPT | Performed by: INTERNAL MEDICINE

## 2023-05-07 PROCEDURE — 85007 BL SMEAR W/DIFF WBC COUNT: CPT | Performed by: INTERNAL MEDICINE

## 2023-05-07 PROCEDURE — 97116 GAIT TRAINING THERAPY: CPT

## 2023-05-07 PROCEDURE — 99232 SBSQ HOSP IP/OBS MODERATE 35: CPT | Performed by: NURSE PRACTITIONER

## 2023-05-07 PROCEDURE — 63710000001 RUXOLITINIB 15 MG TABLET: Performed by: INTERNAL MEDICINE

## 2023-05-07 RX ADMIN — ATORVASTATIN CALCIUM 80 MG: 80 TABLET, FILM COATED ORAL at 21:51

## 2023-05-07 RX ADMIN — APIXABAN 5 MG: 5 TABLET, FILM COATED ORAL at 10:13

## 2023-05-07 RX ADMIN — APIXABAN 5 MG: 5 TABLET, FILM COATED ORAL at 21:51

## 2023-05-07 RX ADMIN — ZINC OXIDE 1 APPLICATION: 200 OINTMENT TOPICAL at 21:51

## 2023-05-07 RX ADMIN — SODIUM BICARBONATE 650 MG: 650 TABLET ORAL at 17:36

## 2023-05-07 RX ADMIN — METOPROLOL SUCCINATE 25 MG: 25 TABLET, EXTENDED RELEASE ORAL at 11:59

## 2023-05-07 RX ADMIN — IOPAMIDOL 100 ML: 755 INJECTION, SOLUTION INTRAVENOUS at 21:03

## 2023-05-07 RX ADMIN — Medication 250 MG: at 10:13

## 2023-05-07 RX ADMIN — ZINC OXIDE 1 APPLICATION: 200 OINTMENT TOPICAL at 10:14

## 2023-05-07 RX ADMIN — SODIUM BICARBONATE 650 MG: 650 TABLET ORAL at 21:51

## 2023-05-07 RX ADMIN — CLOPIDOGREL BISULFATE 75 MG: 75 TABLET, FILM COATED ORAL at 10:13

## 2023-05-07 RX ADMIN — SODIUM BICARBONATE 650 MG: 650 TABLET ORAL at 10:13

## 2023-05-07 RX ADMIN — FAMOTIDINE 20 MG: 20 TABLET, FILM COATED ORAL at 10:13

## 2023-05-07 RX ADMIN — SERTRALINE 100 MG: 100 TABLET, FILM COATED ORAL at 10:13

## 2023-05-07 RX ADMIN — PETROLATUM 1 APPLICATION: 420 OINTMENT TOPICAL at 10:14

## 2023-05-07 RX ADMIN — Medication 250 MG: at 21:51

## 2023-05-07 RX ADMIN — LATANOPROST 1 DROP: 50 SOLUTION OPHTHALMIC at 21:51

## 2023-05-07 RX ADMIN — RUXOLITINIB 15 MG: 15 TABLET ORAL at 21:52

## 2023-05-07 RX ADMIN — METOPROLOL SUCCINATE 25 MG: 25 TABLET, EXTENDED RELEASE ORAL at 22:03

## 2023-05-07 NOTE — PROGRESS NOTES
Name: Catherine Boyer ADMIT: 5/3/2023   : 1940  PCP: Kristi Moreau APRN    MRN: [id number] LOS: 3 days   AGE/SEX: 83 y.o. female  ROOM: [facility]/     Subjective   Subjective   No acute events. Patient is feeling much better. She denies new complaints. Tolerating full liquids without nausea or vomiting. No diarrhea or abdominal pain today. No CP/dyspnea/f/c.  Her daughter is at bedside.  Objective   Objective   Vital Signs  Temp:  [98 °F (36.7 °C)-98.8 °F (37.1 °C)] 98.6 °F (37 °C)  Heart Rate:  [81-94] 84  Resp:  [16] 16  BP: (103-124)/(49-71) 114/58  SpO2:  [91 %-96 %] 95 %  on  Flow (L/min):  [2] 2;   Device (Oxygen Therapy): nasal cannula  Body mass index is 26.9 kg/m².  Physical Exam  Vitals and nursing note reviewed.   Constitutional:       General: She is not in acute distress.     Appearance: She is not toxic-appearing or diaphoretic.   HENT:      Head: Normocephalic and atraumatic.      Nose: Nose normal.      Mouth/Throat:      Mouth: Mucous membranes are moist.      Pharynx: Oropharynx is clear.   Eyes:      Conjunctiva/sclera: Conjunctivae normal.      Pupils: Pupils are equal, round, and reactive to light.   Cardiovascular:      Rate and Rhythm: Normal rate and regular rhythm.      Pulses: Normal pulses.   Pulmonary:      Effort: Pulmonary effort is normal.      Breath sounds: Normal breath sounds.   Abdominal:      General: Bowel sounds are normal.      Palpations: Abdomen is soft.      Tenderness: There is no abdominal tenderness.   Musculoskeletal:         General: No swelling or tenderness.      Cervical back: Normal range of motion and neck supple.   Skin:     General: Skin is warm and dry.      Capillary Refill: Capillary refill takes less than 2 seconds.   Neurological:      General: No focal deficit present.      Mental Status: She is alert.   Psychiatric:         Mood and Affect: Mood normal.         Behavior: Behavior normal.       Results Review     I reviewed the patient's new  clinical results.  Results from last 7 days   Lab Units 05/07/23 0526 05/06/23 0419 05/05/23  0524 05/04/23  0350   WBC 10*3/mm3 17.86* 39.61* 64.55* 36.70*   HEMOGLOBIN g/dL 7.6* 9.0* 10.3* 10.7*   PLATELETS 10*3/mm3 129* 149 238 239     Results from last 7 days   Lab Units 05/07/23 0526 05/06/23 0419 05/05/23 0524 05/04/23  2154 05/04/23  0350   SODIUM mmol/L 140 139 140  --  138   POTASSIUM mmol/L 3.8 4.4 5.1 4.7 3.4*   CHLORIDE mmol/L 111* 112* 113*  --  107   CO2 mmol/L 22.3 20.2* 15.6*  --  17.5*   BUN mg/dL 12 16 27*  --  42*   CREATININE mg/dL 0.52* 0.65 0.75  --  1.42*   GLUCOSE mg/dL 97 88 105*  --  77   EGFR mL/min/1.73 91.2 86.4 78.1  --  36.3*     Results from last 7 days   Lab Units 05/03/23  1534   ALBUMIN g/dL 3.6   BILIRUBIN mg/dL 0.5   ALK PHOS U/L 282*   AST (SGOT) U/L 19   ALT (SGPT) U/L 25     Results from last 7 days   Lab Units 05/07/23 0526 05/06/23 0419 05/05/23  0902 05/05/23 0524 05/04/23  0350 05/03/23  1534   CALCIUM mg/dL 8.2* 8.2*  --  8.6 8.0* 8.3*   ALBUMIN g/dL  --   --   --   --   --  3.6   MAGNESIUM mg/dL  --   --  1.7  --  1.7  --      Results from last 7 days   Lab Units 05/03/23  1534   LACTATE mmol/L 1.8     No results found for: HGBA1C, POCGLU    XR Chest PA & Lateral    Result Date: 5/5/2023  Bilateral infiltrates, follow-up recommended.  This report was finalized on 5/5/2023 4:32 PM by Dr. Reilly Ritter M.D.      I have personally reviewed all medications:  Scheduled Medications  apixaban, 5 mg, Oral, BID  atorvastatin, 80 mg, Oral, Nightly  clopidogrel, 75 mg, Oral, Daily  famotidine, 20 mg, Oral, Daily  hydrocortisone-bacitracin-zinc oxide-nystatin, 1 application, Topical, BID  latanoprost, 1 drop, Both Eyes, Nightly  metoprolol succinate XL, 25 mg, Oral, BID  Petrolatum, 1 application, Topical, Q24H  ruxolitinib, 15 mg, Oral, BID  saccharomyces boulardii, 250 mg, Oral, BID  sertraline, 100 mg, Oral, Daily  sodium bicarbonate, 650 mg, Oral, TID    Infusions    Diet  Diet: Regular/House Diet, Gastrointestinal Diets; Low Irritant; Texture: Regular Texture (IDDSI 7); Fluid Consistency: Thin (IDDSI 0)    I have personally reviewed:  [x]  Laboratory   [x]  Microbiology   []  Radiology   [x]  EKG/Telemetry  [x]  Cardiology/Vascular   []  Pathology    [x]  Records    Assessment/Plan     Active Hospital Problems    Diagnosis  POA   • **Rotavirus enteritis [A08.0]  Unknown   • Moderate Malnutrition (HCC) [E44.0]  Yes   • 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 [Z22.1]  Not Applicable   • KARLI (acute kidney injury) [N17.9]  Unknown   • Acute renal failure, unspecified acute renal failure type [N17.9]  Yes   • Epigastric abdominal pain [R10.13]  Yes   • Chronic diastolic (congestive) heart failure [I50.32]  Yes   • Hypertension [I10]  Yes   • CAD (coronary artery disease) [I25.10]  Yes   • Myeloproliferative disorder [D47.1]  Yes   • Paroxysmal atrial fibrillation [I48.0]  Yes   • Type 2 diabetes mellitus with circulatory disorder, 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   Rotavirus Enteritis  - improving  - continue supportive care with antiemetics, SLIV  - advance to low irritant diet  - Cdiff PCR is positive with a negative antigen, indicating colonization rather than infection-no specific treatment for this is indicated  - continue on florastor    KARLI  - secondary to above, resolved with IVF    Chest Pain/Dyspnea/chronic Diastolic CHF/CAD  - CP and dyspnea resolved; troponin elevation flat and non-specific  - echocardiogram noted with pulmonary hypertension, CTA chest pending  - continue on current regimen, monitor volume status  - appreciate cardiology recs    PAF  - rate controlled, continue metoprolol  - continue AC with eliquis    Type 2 DM  - BG acceptable, no need for insulin coverage at this time    Myeloproliferative Disorder  - has chronically elevated WBC    Abnormal UA  - looks contaminated  - no  UTI symptoms and clinically improving without antibiotics  - no specific treatment for this is indicated    Eliquis (home med) for DVT prophylaxis.  DNR.  Discussed with patient, family and nursing staff.  Anticipate discharge to SNU facility in 1-2 days.      Kenton Meeks MD  [facility]  05/07/23  13:29 EDT

## 2023-05-07 NOTE — THERAPY EVALUATION
Patient Name: Catherine Boyer  : 1940    MRN: [id number]                              Today's Date: 2023       Admit Date: 5/3/2023    Visit Dx:     ICD-10-CM ICD-9-CM   1. Acute renal failure, unspecified acute renal failure type  N17.9 584.9   2. Hypokalemia  E87.6 276.8   3. Nausea vomiting and diarrhea  R11.2 787.91    R19.7 787.01   4. Weight loss  R63.4 783.21     Patient Active Problem List   Diagnosis   • CAD (coronary artery disease)   • Nonbacterial thrombotic endocarditis   • Paroxysmal atrial fibrillation   • Myeloproliferative disorder   • Microcytic anemia   • Type 2 diabetes mellitus with circulatory disorder, without long-term current use of insulin   • GERD (gastroesophageal reflux disease)   • Hypertension   • Personal history of transient ischemic attack (TIA), 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 deficits   • Porcelain gallbladder   • Breast cancer   • Chronic diastolic (congestive) heart failure   • Cholecystitis   • Urinary tract infection without hematuria, site unspecified   • Epigastric abdominal pain   • Gastritis   • Atherosclerosis of abdominal aorta   • APC (atrial premature contractions)   • Rotavirus enteritis   • KARLI (acute kidney injury)   • Acute renal failure, unspecified acute renal failure type   • Moderate Malnutrition (HCC)   • 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     Past Medical History:   Diagnosis Date   • Atherosclerosis of abdominal aorta 2023   • Atrial fibrillation    • Breast cancer    • CAD (coronary artery disease)     NSTEMI 2022: 90% ostial LAD, 99% D1, 70% mid-distal LAD (medical therapy). She received two stents (2.5x18 and 2.5x26mm Britt LEFTY) but I don't know which one went to which lesion.   • Carotid atherosclerosis    • Chronic diastolic (congestive) heart failure    • COVID 10/29/2022   • GERD (gastroesophageal reflux disease)    • Glaucoma    • History of cataract    • Hypertension    • Microcytic anemia     per Dr. oleg pate office  note  6/30/22-dd   • Myeloproliferative disorder     JAK2 positive   • Nonbacterial thrombotic endocarditis     6/2021: 4x5mm vegetation on the ventricular surface of the anterior MV, negative blood cultures   • Porcelain gallbladder    • PUD (peptic ulcer disease)    • TIA (transient ischemic attack)    • Type 2 diabetes mellitus    • Type 2 diabetes mellitus with circulatory disorder, without long-term current use of insulin 7/14/2022   • Upper GI bleed      Past Surgical History:   Procedure Laterality Date   • BREAST LUMPECTOMY  1999   • CARDIAC CATHETERIZATION N/A 09/02/2022    Procedure: Coronary angiography;  Surgeon: Guero Verde MD;  Location: [facility] CATH INVASIVE LOCATION;  Service: Cardiovascular;  Laterality: N/A;   • CARDIAC CATHETERIZATION N/A 09/02/2022    Procedure: Stent LEFTY coronary;  Surgeon: Guero Verde MD;  Location: [facility] CATH INVASIVE LOCATION;  Service: Cardiovascular;  Laterality: N/A;   • CATARACT EXTRACTION  2011   • CHOLECYSTECTOMY     • CHOLECYSTECTOMY WITH INTRAOPERATIVE CHOLANGIOGRAM N/A 11/28/2022    Procedure: CHOLECYSTECTOMY LAPAROSCOPIC INTRAOPERATIVE CHOLANGIOGRAM;  Surgeon: Dani Grover Jr., MD;  Location: [facility] MAIN OR;  Service: General;  Laterality: N/A;   • COLONOSCOPY N/A 02/09/2023    Procedure: COLONOSCOPY TO CECUM & T.I.;  Surgeon: Guero Ferris MD;  Location: [facility] ENDOSCOPY;  Service: Gastroenterology;  Laterality: N/A;  PRE- MELENA, GI BLEED  POST- DIVERTICULOSIS, INT HEMORRHOIDS   • ENDOSCOPY N/A 01/25/2023    Procedure: ESOPHAGOGASTRODUODENOSCOPY WITH BIOPSIES;  Surgeon: Charles Diaz MD;  Location: [facility] ENDOSCOPY;  Service: Gastroenterology;  Laterality: N/A;  pre: abd pain, nausea  post: hiatal hernia, mild gastritis, sloughing of the esophagus   • ENTEROSCOPY SMALL BOWEL N/A 02/09/2023    Procedure: ENTEROSCOPY SMALL BOWEL;  Surgeon: Guero Ferris MD;  Location: [facility] ENDOSCOPY;  Service: Gastroenterology;  Laterality: N/A;  PRE- MELENA, GI  BLEED  POST- HIATAL HERNIA   • JOINT REPLACEMENT     • UPPER GASTROINTESTINAL ENDOSCOPY        General Information     [facility] Name 05/07/23 1435          Physical Therapy Time and Intention    Document Type evaluation  -     Mode of Treatment physical therapy  -[facility] Name 05/07/23 1435          General Information    Patient Profile Reviewed yes  -LW     Prior Level of Function min assist:  With meals, housekeeping, laundry  -     Existing Precautions/Restrictions oxygen therapy device and L/min  -[facility] Name 05/07/23 1435          Living Environment    People in Home facility resident  -[facility] Name 05/07/23 1435          Home Main Entrance    Number of Stairs, Main Entrance none  -[facility] Name 05/07/23 1435          Stairs Within Home, Primary    Number of Stairs, Within Home, Primary none  -[facility] Name 05/07/23 1435          Cognition    Orientation Status (Cognition) oriented x 3  -[facility] Name 05/07/23 1435          Safety Issues, Functional Mobility    Impairments Affecting Function (Mobility) endurance/activity tolerance;shortness of breath;strength  -           User Key  (r) = Recorded By, (t) = Taken By, (c) = Cosigned By    Initials Name Provider Type    LW Bri Reynolds, PT Physical Therapist               Mobility     [facility] Name 05/07/23 1437          Bed Mobility    Bed Mobility supine-sit;sit-supine  -LW     Supine-Sit [place] (Bed Mobility) standby assist  -LW     Sit-Supine [place] (Bed Mobility) standby assist  -     Assistive Device (Bed Mobility) bed rails;head of bed elevated  -[facility] Name 05/07/23 1437          Sit-Stand Transfer    Sit-Stand [place] (Transfers) standby assist  -     Assistive Device (Sit-Stand Transfers) walker, front-wheeled  -[facility] Name 05/07/23 1437          Gait/Stairs (Locomotion)    [place] Level (Gait) contact guard  -     Assistive Device (Gait) walker, front-wheeled  -     Distance in Feet (Gait) 60'  -LW      [place] Level (Stairs) not tested  -LW           User Key  (r) = Recorded By, (t) = Taken By, (c) = Cosigned By    Initials Name Provider Type    Bri Riggins PT Physical Therapist               Obj/Interventions     Row Name 05/07/23 1438          Range of Motion Comprehensive    General Range of Motion bilateral lower extremity ROM WFL  -LW     Row Name 05/07/23 1438          Strength Comprehensive (MMT)    Comment, General Manual Muscle Testing (MMT) Assessment Generalized weakness  -LW     Row Name 05/07/23 1438          Balance    Comment, Balance No LOB with activity  -LW           User Key  (r) = Recorded By, (t) = Taken By, (c) = Cosigned By    Initials Name Provider Type    Bri Riggins, PT Physical Therapist               Goals/Plan     Row Name 05/07/23 1444          Gait Training Goal 1 (PT)    Activity/Assistive Device (Gait Training Goal 1, PT) gait (walking locomotion);walker, rolling  -LW     [place] Level (Gait Training Goal 1, PT) supervision required  -LW     Distance (Gait Training Goal 1, PT) 120'  -LW     Time Frame (Gait Training Goal 1, PT) 1 week  -LW     Strategies/Barriers (Gait Training Goal 1, PT) O2sats >90% on RA  -LW     Progress/Outcome (Gait Training Goal 1, PT) new goal  -LW           User Key  (r) = Recorded By, (t) = Taken By, (c) = Cosigned By    Initials Name Provider Type    Bri Riggins, PT Physical Therapist               Clinical Impression     Row Name 05/07/23 1439          Pain    Pretreatment Pain Rating 0/10 - no pain  -LW     Pain Intervention(s) Ambulation/increased activity;Repositioned  -LW     Row Name 05/07/23 1439          Plan of Care Review    Plan of Care Reviewed With patient  -LW     Progress improving  -LW     Outcome Evaluation Patient admitted for rotavirus enteritis. At baseline she lives at [facility] and receives mealse, housekeeping, and laundry; and uses 2L O2 at night. She walks with her rollator and has a long walk to get to  the dining room because she is on the second floor of the building. Today pt able to perform bed mobility and STS with SBA and RW, but reports fatigue with walking 60' CGA without O2, and O2sats dropped to 88%. O2sats improved after resting and with redonning NC to 94%. Patient would benefit from skilled PT while in acute setting and at SNF on d/c to regain activity tolerance to regain functional mobility and return to PLOF.  -LW     Row Name 05/07/23 1439          Therapy Assessment/Plan (PT)    Rehab Potential (PT) good, to achieve stated therapy goals  -LW     Criteria for Skilled Interventions Met (PT) yes  -LW     Therapy Frequency (PT) 5 times/wk  -LW     Row Name 05/07/23 1439          Vital Signs    Pretreatment Heart Rate (beats/min) 76  -LW     Pre SpO2 (%) 99  -LW     O2 Delivery Pre Treatment supplemental O2  -LW     Intra SpO2 (%) 88  -LW     O2 Delivery Intra Treatment room air  -LW     Post SpO2 (%) 94  -LW     O2 Delivery Post Treatment supplemental O2  -LW     Pre Patient Position Supine  -LW     Intra Patient Position Standing  -LW     Post Patient Position Supine  -LW     Row Name 05/07/23 1439          Positioning and Restraints    Pre-Treatment Position in bed  -LW     Post Treatment Position bed  -LW     In Bed notified nsg;supine;call light within reach;encouraged to call for assist;exit alarm on;side rails up x2  -LW           User Key  (r) = Recorded By, (t) = Taken By, (c) = Cosigned By    Initials Name Provider Type    Bri Riggins, PT Physical Therapist               Outcome Measures     Row Name 05/07/23 1444          How much help from another person do you currently need...    Turning from your back to your side while in flat bed without using bedrails? 4  -LW     Moving from lying on back to sitting on the side of a flat bed without bedrails? 4  -LW     Moving to and from a bed to a chair (including a wheelchair)? 3  -LW     Standing up from a chair using your arms (e.g.,  wheelchair, bedside chair)? 4  -LW     Climbing 3-5 steps with a railing? 2  -LW     To walk in hospital room? 3  -LW     AM-PAC 6 Clicks Score (PT) 20  -LW     Highest level of mobility 6 --> Walked 10 steps or more  -     Row Name 05/07/23 1444          Functional Assessment    Outcome Measure Options AM-PAC 6 Clicks Basic Mobility (PT)  -           User Key  (r) = Recorded By, (t) = Taken By, (c) = Cosigned By    Initials Name Provider Type    LW Bri Reynolds, PT Physical Therapist                             Physical Therapy Education     Title: PT OT SLP Therapies (Done)     Topic: Physical Therapy (Done)     Point: Mobility training (Done)     Learning Progress Summary           Patient Acceptance, E, VU by  at 5/7/2023 1445                   Point: Home exercise program (Done)     Learning Progress Summary           Patient Acceptance, E, VU by  at 5/7/2023 1445                   Point: Body mechanics (Done)     Learning Progress Summary           Patient Acceptance, E, VU by  at 5/7/2023 1445                   Point: Precautions (Done)     Learning Progress Summary           Patient Acceptance, E, VU by  at 5/7/2023 1445                               User Key     Initials Effective Dates Name Provider Type Discipline     06/10/22 -  Bri Reynolds, PT Physical Therapist PT              PT Recommendation and Plan     Plan of Care Reviewed With: patient  Progress: improving  Outcome Evaluation: Patient admitted for rotavirus enteritis. At baseline she lives at [facility] and receives mealse, housekeeping, and laundry; and uses 2L O2 at night. She walks with her rollator and has a long walk to get to the dining room because she is on the second floor of the building. Today pt able to perform bed mobility and STS with SBA and RW, but reports fatigue with walking 60' CGA without O2, and O2sats dropped to 88%. O2sats improved after resting and with redonning NC to 94%. Patient would benefit from  skilled PT while in acute setting and at SNF on d/c to regain activity tolerance to regain functional mobility and return to PLOF.     Time Calculation:    PT Charges     Row Name 05/07/23 1446             Time Calculation    Start Time 1213  -LW      Stop Time 1232  -LW      Time Calculation (min) 19 min  -LW      PT Received On 05/07/23  -LW      PT - Next Appointment 05/08/23  -LW      PT Goal Re-Cert Due Date 05/14/23  -LW         Time Calculation- PT    Total Timed Code Minutes- PT 12 minute(s)  -LW         Timed Charges    63531 - Gait Training Minutes  12  -LW         Untimed Charges    PT Eval/Re-eval Minutes 7  -LW         Total Minutes    Timed Charges Total Minutes 12  -LW      Untimed Charges Total Minutes 7  -LW       Total Minutes 19  -LW            User Key  (r) = Recorded By, (t) = Taken By, (c) = Cosigned By    Initials Name Provider Type    LW Bri Reynolds, PT Physical Therapist              Therapy Charges for Today     Code Description Service Date Service Provider Modifiers Qty    61851995123 HC GAIT TRAINING EA 15 MIN 5/7/2023 Bri Reynolds, PT GP 1    23800620080 HC PT EVAL MOD COMPLEXITY 3 5/7/2023 Bri Reynolds, PT GP 1          PT G-Codes  Outcome Measure Options: AM-PAC 6 Clicks Basic Mobility (PT)  AM-PAC 6 Clicks Score (PT): 20  PT Discharge Summary  Anticipated Discharge Disposition (PT): skilled nursing facility    Bri Reynolds PT  5/7/2023

## 2023-05-07 NOTE — PLAN OF CARE
Goal Outcome Evaluation:  Plan of Care Reviewed With: patient        Progress: improving  Outcome Evaluation: Patient admitted for rotavirus enteritis. At baseline she lives at [facility] and receives mealse, housekeeping, and laundry; and uses 2L O2 at night. She walks with her rollator and has a long walk to get to the dining room because she is on the second floor of the building. Today pt able to perform bed mobility and STS with SBA and RW, but reports fatigue with walking 60' CGA without O2, and O2sats dropped to 88%. O2sats improved after resting and with redonning NC to 94%. Patient would benefit from skilled PT while in acute setting and at SNF on d/c to regain activity tolerance to regain functional mobility and return to PLOF.

## 2023-05-07 NOTE — PROGRESS NOTES
"[facility]    Patient Name: Catherine Boyer  :1940  83 y.o.  LOS: 3  Encounter Provider: ABEL Daniels      Patient Care Team:  Kristi Moreau APRN as PCP - General (Nurse Practitioner)  Gerard Foreman MD as Referring Physician (Medical Oncology)  Darrell Reinoso MD as Consulting Physician (Hematology and Oncology)  Albin Barriga MD as Cardiologist (Cardiology)  Richard Aldana, RN as     Chief Complaint: Follow-up chest pain, elevated troponin    Interval History: Echocardiogram revealed pulmonary hypertension.  Family has noted that patient has seemed more short of breath recently.  Denies known history of sleep apnea       Objective   Vital Signs  Temp:  [98.1 °F (36.7 °C)-98.8 °F (37.1 °C)] 98.1 °F (36.7 °C)  Heart Rate:  [85-94] 85  Resp:  [16] 16  BP: (103-124)/(52-71) 124/71    Intake/Output Summary (Last 24 hours) at 2023 1101  Last data filed at 2023 0546  Gross per 24 hour   Intake 0 ml   Output 400 ml   Net -400 ml     Flowsheet Rows    Flowsheet Row First Filed Value   Admission Height 154.9 cm (61\") Documented at 2023 1540   Admission Weight 53.5 kg (118 lb) Documented at 2023 1540            Vitals and nursing note reviewed.   Constitutional:       Appearance: Normal appearance. Well-developed.   Eyes:      Conjunctiva/sclera: Conjunctivae normal.   Neck:      Vascular: No carotid bruit.   Pulmonary:      Breath sounds: Normal breath sounds.   Cardiovascular:      Normal rate. Regular rhythm. Normal S1 with normal intensity. Normal S2 with normal intensity.      Murmurs: There is no murmur.      No gallop. No click. No rub.   Musculoskeletal: Normal range of motion. Skin:     General: Skin is warm and dry.   Neurological:      Mental Status: Alert and oriented to person, place, and time.      GCS: GCS eye subscore is 4. GCS verbal subscore is 5. GCS motor subscore is 6.   Psychiatric:         Speech: Speech normal.         Behavior: " Behavior normal.         Thought Content: Thought content normal.         Judgment: Judgment normal.           Pertinent Test Results:  Results from last 7 days   Lab Units 05/07/23  0526 05/06/23 0419 05/05/23 0524 05/04/23  2154 05/04/23 0350 05/03/23  1534   SODIUM mmol/L 140 139 140  --  138 135*   POTASSIUM mmol/L 3.8 4.4 5.1 4.7 3.4* 3.1*   CHLORIDE mmol/L 111* 112* 113*  --  107 100   CO2 mmol/L 22.3 20.2* 15.6*  --  17.5* 20.0*   BUN mg/dL 12 16 27*  --  42* 43*   CREATININE mg/dL 0.52* 0.65 0.75  --  1.42* 1.80*   GLUCOSE mg/dL 97 88 105*  --  77 97   CALCIUM mg/dL 8.2* 8.2* 8.6  --  8.0* 8.3*   AST (SGOT) U/L  --   --   --   --   --  19   ALT (SGPT) U/L  --   --   --   --   --  25     Results from last 7 days   Lab Units 05/05/23  1100 05/05/23  0902   HSTROP T ng/L 29* 32*     Results from last 7 days   Lab Units 05/07/23 0526 05/06/23 0419 05/05/23 0524 05/04/23  0350 05/03/23  1534   WBC 10*3/mm3 17.86* 39.61* 64.55* 36.70* 46.50*   HEMOGLOBIN g/dL 7.6* 9.0* 10.3* 10.7* 11.4*   HEMATOCRIT % 22.7* 27.1* 31.3* 32.7* 34.3   PLATELETS 10*3/mm3 129* 149 238 239 276         Results from last 7 days   Lab Units 05/05/23 0902 05/04/23  0350   MAGNESIUM mg/dL 1.7 1.7           Invalid input(s): LDLCALC                Medication Review:   apixaban, 5 mg, Oral, BID  atorvastatin, 80 mg, Oral, Nightly  clopidogrel, 75 mg, Oral, Daily  famotidine, 20 mg, Oral, Daily  hydrocortisone-bacitracin-zinc oxide-nystatin, 1 application, Topical, BID  latanoprost, 1 drop, Both Eyes, Nightly  metoprolol succinate XL, 25 mg, Oral, BID  Petrolatum, 1 application, Topical, Q24H  ruxolitinib, 15 mg, Oral, BID  saccharomyces boulardii, 250 mg, Oral, BID  sertraline, 100 mg, Oral, Daily  sodium bicarbonate, 650 mg, Oral, TID              Assessment & Plan     Active Hospital Problems    Diagnosis  POA   • **Rotavirus enteritis [A08.0]  Unknown   • Moderate Malnutrition (HCC) [E44.0]  Yes   • 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  [Z22.1]  Not Applicable   • KARLI (acute kidney injury) [N17.9]  Unknown   • Acute renal failure, unspecified acute renal failure type [N17.9]  Yes   • Epigastric abdominal pain [R10.13]  Yes   • Chronic diastolic (congestive) heart failure [I50.32]  Yes   • Hypertension [I10]  Yes   • CAD (coronary artery disease) [I25.10]  Yes   • Myeloproliferative disorder [D47.1]  Yes   • Paroxysmal atrial fibrillation [I48.0]  Yes   • Type 2 diabetes mellitus with circulatory disorder, 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        1. Rotavirus enteritis with nausea, vomiting, diarrhea  2. KARLI: Likely secondary to #1 above  3. Chest pain with nonspecific troponin elevation  4. Myeloproliferative disorder with significantly elevated WBC  5. Multifactorial anemia  6. CAD with history of LEFTY to the LAD and diagonal in 2/2022.  99% in-stent restenosis of the diagonal stent with placement of new stent September 2022.  Patient also known to have 60% proximal to mid LAD disease and 100%  of the mid RCA with left-to-right collaterals.  7. Akinetic apex with preserved LVEF  8. PAF  9. History of TIA  10. Diabetes    · Heart rate controlled.  Patient anticoagulated for A-fib  · Echocardiogram yesterday reveals preserved LVEF.  Mild septal asymmetric hypertrophy.  Mild aortic valve stenosis.  Maximum pressure gradient 16 mmHg.  Severe pulmonary hypertension with RVSP 62 mmHg.  Pulmonary hypertension is a new finding compared to echocardiogram 9/1/2022.  · Denies history of sleep apnea  · Family notes that patient has had episodes of dyspnea  · At CTA chest to further assess lung involvement causing pulmonary hypertension      ABEL Daniels  [facility]  05/07/23  11:14 EDT

## 2023-05-07 NOTE — PLAN OF CARE
Goal Outcome Evaluation:  Plan of Care Reviewed With: patient           Outcome Evaluation: Pt admitted 5/03/2023 for Rotavirus enteritis. VSS . NC 2 L. A & O X 4 . Pure wick in place. Magic cream and barrier past applied , excoriate area. Pt. Diet changed from clear to full liquid .Safety maintained .Contact spore/ contact precaution maintained.  No new complaints or concerns per pt. Will continue to monitor

## 2023-05-08 LAB
ANION GAP SERPL CALCULATED.3IONS-SCNC: 6.3 MMOL/L (ref 5–15)
ANISOCYTOSIS BLD QL: ABNORMAL
BASOPHILS # BLD MANUAL: 0.6 10*3/MM3 (ref 0–0.2)
BASOPHILS NFR BLD MANUAL: 2.1 % (ref 0–1.5)
BUN SERPL-MCNC: 11 MG/DL (ref 8–23)
BUN/CREAT SERPL: 17.7 (ref 7–25)
CALCIUM SPEC-SCNC: 8.4 MG/DL (ref 8.6–10.5)
CHLORIDE SERPL-SCNC: 105 MMOL/L (ref 98–107)
CO2 SERPL-SCNC: 25.7 MMOL/L (ref 22–29)
CREAT SERPL-MCNC: 0.62 MG/DL (ref 0.57–1)
DEPRECATED RDW RBC AUTO: 54.3 FL (ref 37–54)
EGFRCR SERPLBLD CKD-EPI 2021: 87.4 ML/MIN/1.73
EOSINOPHIL # BLD MANUAL: 0.32 10*3/MM3 (ref 0–0.4)
EOSINOPHIL NFR BLD MANUAL: 1.1 % (ref 0.3–6.2)
ERYTHROCYTE [DISTWIDTH] IN BLOOD BY AUTOMATED COUNT: 16.1 % (ref 12.3–15.4)
GLUCOSE SERPL-MCNC: 125 MG/DL (ref 65–99)
HCT VFR BLD AUTO: 23.9 % (ref 34–46.6)
HGB BLD-MCNC: 8 G/DL (ref 12–15.9)
HYPOCHROMIA BLD QL: ABNORMAL
LYMPHOCYTES # BLD MANUAL: 1.21 10*3/MM3 (ref 0.7–3.1)
LYMPHOCYTES NFR BLD MANUAL: 3.2 % (ref 5–12)
MACROCYTES BLD QL SMEAR: ABNORMAL
MCH RBC QN AUTO: 30.9 PG (ref 26.6–33)
MCHC RBC AUTO-ENTMCNC: 33.5 G/DL (ref 31.5–35.7)
MCV RBC AUTO: 92.3 FL (ref 79–97)
MONOCYTES # BLD: 0.92 10*3/MM3 (ref 0.1–0.9)
MYELOCYTES NFR BLD MANUAL: 2.1 % (ref 0–0)
NEUTROPHILS # BLD AUTO: 25.17 10*3/MM3 (ref 1.7–7)
NEUTROPHILS NFR BLD MANUAL: 87.4 % (ref 42.7–76)
NRBC BLD AUTO-RTO: 0 /100 WBC (ref 0–0.2)
PLAT MORPH BLD: NORMAL
PLATELET # BLD AUTO: 108 10*3/MM3 (ref 140–450)
PMV BLD AUTO: 12 FL (ref 6–12)
POLYCHROMASIA BLD QL SMEAR: ABNORMAL
POTASSIUM SERPL-SCNC: 3.4 MMOL/L (ref 3.5–5.2)
RBC # BLD AUTO: 2.59 10*6/MM3 (ref 3.77–5.28)
SMUDGE CELLS BLD QL SMEAR: ABNORMAL
SODIUM SERPL-SCNC: 137 MMOL/L (ref 136–145)
VARIANT LYMPHS NFR BLD MANUAL: 4.2 % (ref 19.6–45.3)
WBC NRBC COR # BLD: 28.8 10*3/MM3 (ref 3.4–10.8)

## 2023-05-08 PROCEDURE — 80048 BASIC METABOLIC PNL TOTAL CA: CPT | Performed by: INTERNAL MEDICINE

## 2023-05-08 PROCEDURE — 85025 COMPLETE CBC W/AUTO DIFF WBC: CPT | Performed by: INTERNAL MEDICINE

## 2023-05-08 PROCEDURE — 63710000001 RUXOLITINIB 15 MG TABLET: Performed by: INTERNAL MEDICINE

## 2023-05-08 PROCEDURE — 85007 BL SMEAR W/DIFF WBC COUNT: CPT | Performed by: INTERNAL MEDICINE

## 2023-05-08 PROCEDURE — C9399 UNCLASSIFIED DRUGS OR BIOLOG: HCPCS | Performed by: INTERNAL MEDICINE

## 2023-05-08 PROCEDURE — 25010000002 FUROSEMIDE PER 20 MG: Performed by: INTERNAL MEDICINE

## 2023-05-08 PROCEDURE — 99232 SBSQ HOSP IP/OBS MODERATE 35: CPT | Performed by: INTERNAL MEDICINE

## 2023-05-08 RX ORDER — FUROSEMIDE 10 MG/ML
40 INJECTION INTRAMUSCULAR; INTRAVENOUS ONCE
Status: DISCONTINUED | OUTPATIENT
Start: 2023-05-08 | End: 2023-05-08

## 2023-05-08 RX ORDER — POTASSIUM CHLORIDE 750 MG/1
40 TABLET, FILM COATED, EXTENDED RELEASE ORAL ONCE
Status: COMPLETED | OUTPATIENT
Start: 2023-05-08 | End: 2023-05-08

## 2023-05-08 RX ORDER — LOPERAMIDE HYDROCHLORIDE 2 MG/1
2 CAPSULE ORAL 4 TIMES DAILY PRN
Status: DISCONTINUED | OUTPATIENT
Start: 2023-05-08 | End: 2023-05-16 | Stop reason: HOSPADM

## 2023-05-08 RX ORDER — FUROSEMIDE 10 MG/ML
20 INJECTION INTRAMUSCULAR; INTRAVENOUS ONCE
Status: COMPLETED | OUTPATIENT
Start: 2023-05-08 | End: 2023-05-08

## 2023-05-08 RX ORDER — CHOLESTYRAMINE 4 G/9G
1 POWDER, FOR SUSPENSION ORAL EVERY 12 HOURS SCHEDULED
Status: DISCONTINUED | OUTPATIENT
Start: 2023-05-08 | End: 2023-05-14

## 2023-05-08 RX ADMIN — METOPROLOL SUCCINATE 25 MG: 25 TABLET, EXTENDED RELEASE ORAL at 21:04

## 2023-05-08 RX ADMIN — CLOPIDOGREL BISULFATE 75 MG: 75 TABLET, FILM COATED ORAL at 09:55

## 2023-05-08 RX ADMIN — POTASSIUM CHLORIDE 40 MEQ: 750 TABLET, EXTENDED RELEASE ORAL at 17:06

## 2023-05-08 RX ADMIN — ATORVASTATIN CALCIUM 80 MG: 80 TABLET, FILM COATED ORAL at 21:04

## 2023-05-08 RX ADMIN — SODIUM BICARBONATE 650 MG: 650 TABLET ORAL at 21:04

## 2023-05-08 RX ADMIN — RUXOLITINIB 15 MG: 15 TABLET ORAL at 09:53

## 2023-05-08 RX ADMIN — RUXOLITINIB 15 MG: 15 TABLET ORAL at 21:08

## 2023-05-08 RX ADMIN — CHOLESTYRAMINE 1 PACKET: 4 POWDER, FOR SUSPENSION ORAL at 21:04

## 2023-05-08 RX ADMIN — FUROSEMIDE 20 MG: 20 INJECTION, SOLUTION INTRAMUSCULAR; INTRAVENOUS at 17:06

## 2023-05-08 RX ADMIN — APIXABAN 5 MG: 5 TABLET, FILM COATED ORAL at 21:04

## 2023-05-08 RX ADMIN — SODIUM BICARBONATE 650 MG: 650 TABLET ORAL at 09:55

## 2023-05-08 RX ADMIN — Medication 250 MG: at 09:55

## 2023-05-08 RX ADMIN — CHOLESTYRAMINE 1 PACKET: 4 POWDER, FOR SUSPENSION ORAL at 13:36

## 2023-05-08 RX ADMIN — FAMOTIDINE 20 MG: 20 TABLET, FILM COATED ORAL at 09:55

## 2023-05-08 RX ADMIN — APIXABAN 5 MG: 5 TABLET, FILM COATED ORAL at 09:55

## 2023-05-08 RX ADMIN — SERTRALINE 100 MG: 100 TABLET, FILM COATED ORAL at 09:55

## 2023-05-08 RX ADMIN — SODIUM BICARBONATE 650 MG: 650 TABLET ORAL at 17:06

## 2023-05-08 RX ADMIN — METOPROLOL SUCCINATE 25 MG: 25 TABLET, EXTENDED RELEASE ORAL at 09:55

## 2023-05-08 RX ADMIN — Medication 250 MG: at 21:04

## 2023-05-08 RX ADMIN — ZINC OXIDE 1 APPLICATION: 200 OINTMENT TOPICAL at 21:05

## 2023-05-08 RX ADMIN — LATANOPROST 1 DROP: 50 SOLUTION OPHTHALMIC at 21:05

## 2023-05-08 RX ADMIN — ZINC OXIDE 1 APPLICATION: 200 OINTMENT TOPICAL at 09:54

## 2023-05-08 RX ADMIN — PETROLATUM 1 APPLICATION: 420 OINTMENT TOPICAL at 09:54

## 2023-05-08 NOTE — PLAN OF CARE
Goal Outcome Evaluation:              Outcome Evaluation: VSS. RESTED WELL OVERNIGHT IN BETWEEN CARE. NO C/O'S VOICED.

## 2023-05-08 NOTE — PROGRESS NOTES
LOS: 4 days   Patient Care Team:  Kristi Moreau APRN as PCP - General (Nurse Practitioner)  Gerard Foreman MD as Referring Physician (Medical Oncology)  Darrell Reinoso MD as Consulting Physician (Hematology and Oncology)  Albin Barriga MD as Cardiologist (Cardiology)  Richard Aldana, RN as     Chief Complaint: Follow-up for nonspecific troponin elevation, coronary artery disease, paroxysmal atrial fibrillation.    Interval History: The patient has had some increased diarrhea.  She was sleeping when I saw her earlier today.  No reported acute events or chest pain.    Vital Signs:  Temp:  [97.5 °F (36.4 °C)-98.5 °F (36.9 °C)] 97.7 °F (36.5 °C)  Heart Rate:  [76-89] 89  Resp:  [16-18] 18  BP: (103-129)/(52-64) 103/63    Intake/Output Summary (Last 24 hours) at 5/8/2023 1629  Last data filed at 5/8/2023 1312  Gross per 24 hour   Intake 700 ml   Output --   Net 700 ml       Physical Exam:   General Appearance:    No acute distress, sleeping   Lungs:     Decreased breath sounds bilaterally     Heart:    Regular rhythm and normal rate.  II/VI SM LLSB.   Abdomen:     Soft, nontender, nondistended.    Extremities:   No clubbing, cyanosis, or edema.     Results Review:    Results from last 7 days   Lab Units 05/08/23  0245   SODIUM mmol/L 137   POTASSIUM mmol/L 3.4*   CHLORIDE mmol/L 105   CO2 mmol/L 25.7   BUN mg/dL 11   CREATININE mg/dL 0.62   GLUCOSE mg/dL 125*   CALCIUM mg/dL 8.4*     Results from last 7 days   Lab Units 05/05/23  1100 05/05/23  0902   HSTROP T ng/L 29* 32*     Results from last 7 days   Lab Units 05/08/23  0245   WBC 10*3/mm3 28.80*   HEMOGLOBIN g/dL 8.0*   HEMATOCRIT % 23.9*   PLATELETS 10*3/mm3 108*             Results from last 7 days   Lab Units 05/05/23  0902   MAGNESIUM mg/dL 1.7           I reviewed the patient's new clinical results.        Assessment:  1.  Rotavirus enteritis with nausea, vomiting, and diarrhea  2.  Acute kidney injury  3.  Chest pain with nonspecific troponin  elevation  4.  Myeloproliferative disorder with significantly elevated white blood cell count  5.  Multifactorial anemia  6.  Coronary artery disease, status post LEFTY to the LAD and diagonal in February 2022.  99% in-stent restenosis of the diagonal stent with placement of a new stent on 9/2/2022 at [facility].  Also found to have 60% proximal to mid LAD disease and 100% occlusion of the mid RCA with left-to-right collaterals at that time.  7.  Ejection fraction 50 to 55% - acute on chronic diastolic CHF  8.  Paroxysmal atrial fibrillation  9.  History of TIA  10.  Mild aortic stenosis  11.  Elevated RVSP of 62 mm Hg on echo  12.  Diabetes    Plan:  -CT angiogram of the chest reviewed.  No pulmonary embolism.  Pulmonary edema noted.  Agree with IV Lasix today.  Reevaluate volume status tomorrow.    -Troponin elevation is nonspecific.  She does have known coronary artery disease.  Continue treatment with Plavix and atorvastatin.    -Sinus rhythm on Toprol-XL 25 mg twice a day.  Continue Eliquis 5 mg twice daily.    -Suspect elevated RVSP is multifactorial.  Anemia and diastolic CHF likely contribute.    Braden Frausto MD  05/08/23  16:29 EDT

## 2023-05-08 NOTE — PLAN OF CARE
Goal Outcome Evaluation:              Outcome Evaluation: VS WNL, Replacing potassium w/ 40mEq PO ordered once and then 40mEq PO q4hrs times 2 doses (First of 2 doses at 2100) per Electrolyte Replacement Protocol, confirmed by Dr Meeks, will inform night shift

## 2023-05-08 NOTE — NURSING NOTE
WOCN consult: Bilateral groin and abd fold red/ pink maybe due to moisture or yeast. Complaints of discomfort perirectal area. No open wounds noted. Recommend to continue to apply magic barrier cream to bilateral groin, abd fold and perirectal area. May want to ask MD for preporation H for hemorrhoids which maybe causing the patient discomfort. Please call if any further needs.

## 2023-05-08 NOTE — PROGRESS NOTES
Name: Catherine Boyer ADMIT: 5/3/2023   : 1940  PCP: Kristi Moreau APRN    MRN: [id number] LOS: 4 days   AGE/SEX: 83 y.o. female  ROOM: [facility]     Subjective   Subjective   No acute events. Patient states she feels better overall but she is having more diarrhea since advancing her diet. No nausea or vomiting. No more abdominal pain.  No CP/dyspnea/f/c.  No family at bedside.  Objective   Objective   Vital Signs  Temp:  [97.5 °F (36.4 °C)-98.5 °F (36.9 °C)] 97.7 °F (36.5 °C)  Heart Rate:  [76-89] 89  Resp:  [16-18] 18  BP: (103-129)/(52-64) 103/63  SpO2:  [90 %-98 %] 90 %  on  Flow (L/min):  [2] 2;   Device (Oxygen Therapy): nasal cannula  Body mass index is 26.9 kg/m².  Physical Exam  Vitals and nursing note reviewed.   Constitutional:       General: She is not in acute distress.     Appearance: She is not toxic-appearing or diaphoretic.   HENT:      Head: Normocephalic and atraumatic.      Nose: Nose normal.      Mouth/Throat:      Mouth: Mucous membranes are moist.      Pharynx: Oropharynx is clear.   Eyes:      Conjunctiva/sclera: Conjunctivae normal.      Pupils: Pupils are equal, round, and reactive to light.   Cardiovascular:      Rate and Rhythm: Normal rate and regular rhythm.      Pulses: Normal pulses.   Pulmonary:      Effort: Pulmonary effort is normal.      Breath sounds: Normal breath sounds.   Abdominal:      General: Bowel sounds are normal.      Palpations: Abdomen is soft.      Tenderness: There is no abdominal tenderness.   Musculoskeletal:         General: No swelling or tenderness.      Cervical back: Normal range of motion and neck supple.   Skin:     General: Skin is warm and dry.      Capillary Refill: Capillary refill takes less than 2 seconds.   Neurological:      General: No focal deficit present.      Mental Status: She is alert.   Psychiatric:         Mood and Affect: Mood normal.         Behavior: Behavior normal.       Results Review     I reviewed the patient's new  clinical results.  Results from last 7 days   Lab Units 05/08/23 0245 05/07/23 0526 05/06/23 0419 05/05/23 0524   WBC 10*3/mm3 28.80* 17.86* 39.61* 64.55*   HEMOGLOBIN g/dL 8.0* 7.6* 9.0* 10.3*   PLATELETS 10*3/mm3 108* 129* 149 238     Results from last 7 days   Lab Units 05/08/23 0245 05/07/23 0526 05/06/23 0419 05/05/23 0524   SODIUM mmol/L 137 140 139 140   POTASSIUM mmol/L 3.4* 3.8 4.4 5.1   CHLORIDE mmol/L 105 111* 112* 113*   CO2 mmol/L 25.7 22.3 20.2* 15.6*   BUN mg/dL 11 12 16 27*   CREATININE mg/dL 0.62 0.52* 0.65 0.75   GLUCOSE mg/dL 125* 97 88 105*   EGFR mL/min/1.73 87.4 91.2 86.4 78.1     Results from last 7 days   Lab Units 05/03/23  1534   ALBUMIN g/dL 3.6   BILIRUBIN mg/dL 0.5   ALK PHOS U/L 282*   AST (SGOT) U/L 19   ALT (SGPT) U/L 25     Results from last 7 days   Lab Units 05/08/23 0245 05/07/23 0526 05/06/23 0419 05/05/23  0902 05/05/23 0524 05/04/23  0350 05/03/23  1534   CALCIUM mg/dL 8.4* 8.2* 8.2*  --  8.6 8.0* 8.3*   ALBUMIN g/dL  --   --   --   --   --   --  3.6   MAGNESIUM mg/dL  --   --   --  1.7  --  1.7  --      Results from last 7 days   Lab Units 05/03/23  1534   LACTATE mmol/L 1.8     No results found for: HGBA1C, POCGLU    CT Angiogram Chest    Result Date: 5/7/2023  Bilateral pleural effusions with pulmonary edema and left lower lobe atelectasis or pneumonia. No CT evidence of pulmonary embolism or pulmonary arterial hypertension. Extensive atheromatous calcification of the coronary arteries.  This report was finalized on 5/7/2023 11:11 PM by Dr. Charles Tirado M.D.      I have personally reviewed all medications:  Scheduled Medications  apixaban, 5 mg, Oral, BID  atorvastatin, 80 mg, Oral, Nightly  cholestyramine, 1 packet, Oral, Q12H  clopidogrel, 75 mg, Oral, Daily  famotidine, 20 mg, Oral, Daily  hydrocortisone-bacitracin-zinc oxide-nystatin, 1 application, Topical, BID  latanoprost, 1 drop, Both Eyes, Nightly  metoprolol succinate XL, 25 mg, Oral,  BID  Petrolatum, 1 application, Topical, Q24H  ruxolitinib, 15 mg, Oral, BID  saccharomyces boulardii, 250 mg, Oral, BID  sertraline, 100 mg, Oral, Daily  sodium bicarbonate, 650 mg, Oral, TID    Infusions   Diet  Diet: Regular/House Diet, Gastrointestinal Diets; Low Irritant; Texture: Regular Texture (IDDSI 7); Fluid Consistency: Thin (IDDSI 0)    I have personally reviewed:  [x]  Laboratory   []  Microbiology   []  Radiology   [x]  EKG/Telemetry  []  Cardiology/Vascular   []  Pathology    []  Records    Assessment/Plan     Active Hospital Problems    Diagnosis  POA   • **Rotavirus enteritis [A08.0]  Unknown   • Moderate Malnutrition (HCC) [E44.0]  Yes   • 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 [Z22.1]  Not Applicable   • KARLI (acute kidney injury) [N17.9]  Unknown   • Acute renal failure, unspecified acute renal failure type [N17.9]  Yes   • Epigastric abdominal pain [R10.13]  Yes   • Chronic diastolic (congestive) heart failure [I50.32]  Yes   • Hypertension [I10]  Yes   • CAD (coronary artery disease) [I25.10]  Yes   • Myeloproliferative disorder [D47.1]  Yes   • Paroxysmal atrial fibrillation [I48.0]  Yes   • Type 2 diabetes mellitus with circulatory disorder, 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   Rotavirus Enteritis  - improving overall but she does appear to have some diarrhea with diet advancement, likely postinfectious  - continue low irritant diet  - will start on cholestyramine and PRN imodium  - Cdiff PCR is positive with a negative antigen, indicating colonization rather than infection-no specific treatment for this is indicated    KARLI  - secondary to above, resolved with IVF    Chest Pain/Dyspnea/chronic Diastolic CHF/CAD  - CP and dyspnea resolved; troponin elevation flat and non-specific  - echocardiogram noted with pulmonary hypertension, CTA chest noted with pulmonary edema-will give IV lasix  - continue on current regimen,  monitor volume status  - appreciate cardiology recs    PAF  - rate controlled, continue metoprolol  - continue AC with eliquis    Type 2 DM  - BG acceptable, no need for insulin coverage at this time    Myeloproliferative Disorder  - has chronically elevated WBC and anemia and this has been stable  - on Jakafi    Abnormal UA  - looks contaminated  - no UTI symptoms and clinically improving without antibiotics  - no specific treatment for this is indicated    Eliquis (home med) for DVT prophylaxis.  DNR.  Discussed with patient, nursing staff and care team on multidisciplinary rounds.  Anticipate discharge to SNU facility in 1-2 days.      Kenton Meeks MD  [facility]  05/08/23  16:14 EDT

## 2023-05-08 NOTE — CASE MANAGEMENT/SOCIAL WORK
Continued Stay Note  [facility]     Patient Name: Catherine Boyer  MRN: [id number]  Today's Date: 5/8/2023    Admit Date: 5/3/2023    Plan: Vandana at [place] pending [employer] precert   Discharge Plan     Row Name 05/08/23 1230       Plan    Plan Corbin at [facility] pending [employer] precert    Plan Comments CCP spoke with Salvador at [facility] who states they do have a bed for this patient and they are verifying her benefits now. Once they get the benefits verified, they will initiate the [employer] M[employer] precert. Jean Carlos at [place] to call and let CCP know once she starts precert. LINDSEY HENDRICKS               Discharge Codes    No documentation.               Expected Discharge Date and Time     Expected Discharge Date Expected Discharge Time    May 8, 2023             LINDSEY Mckinney

## 2023-05-09 LAB
ANION GAP SERPL CALCULATED.3IONS-SCNC: 11 MMOL/L (ref 5–15)
BASO STIPL COARSE BLD QL SMEAR: ABNORMAL
BUN SERPL-MCNC: 11 MG/DL (ref 8–23)
BUN/CREAT SERPL: 16.7 (ref 7–25)
CALCIUM SPEC-SCNC: 8.3 MG/DL (ref 8.6–10.5)
CHLORIDE SERPL-SCNC: 100 MMOL/L (ref 98–107)
CO2 SERPL-SCNC: 28 MMOL/L (ref 22–29)
CREAT SERPL-MCNC: 0.66 MG/DL (ref 0.57–1)
DEPRECATED RDW RBC AUTO: 54.3 FL (ref 37–54)
EGFRCR SERPLBLD CKD-EPI 2021: 86.1 ML/MIN/1.73
ERYTHROCYTE [DISTWIDTH] IN BLOOD BY AUTOMATED COUNT: 16 % (ref 12.3–15.4)
GLUCOSE SERPL-MCNC: 86 MG/DL (ref 65–99)
HCT VFR BLD AUTO: 25.6 % (ref 34–46.6)
HGB BLD-MCNC: 8.6 G/DL (ref 12–15.9)
HYPOCHROMIA BLD QL: ABNORMAL
LYMPHOCYTES # BLD MANUAL: 4.61 10*3/MM3 (ref 0.7–3.1)
LYMPHOCYTES NFR BLD MANUAL: 6.1 % (ref 5–12)
MCH RBC QN AUTO: 30.9 PG (ref 26.6–33)
MCHC RBC AUTO-ENTMCNC: 33.6 G/DL (ref 31.5–35.7)
MCV RBC AUTO: 92.1 FL (ref 79–97)
MONOCYTES # BLD: 2.15 10*3/MM3 (ref 0.1–0.9)
NEUTROPHILS # BLD AUTO: 28.07 10*3/MM3 (ref 1.7–7)
NEUTROPHILS NFR BLD MANUAL: 79.8 % (ref 42.7–76)
OTHER CELLS %: 1 % (ref 0–0)
PLAT MORPH BLD: NORMAL
PLATELET # BLD AUTO: 155 10*3/MM3 (ref 140–450)
PMV BLD AUTO: 12.9 FL (ref 6–12)
POTASSIUM SERPL-SCNC: 3.7 MMOL/L (ref 3.5–5.2)
RBC # BLD AUTO: 2.78 10*6/MM3 (ref 3.77–5.28)
SODIUM SERPL-SCNC: 139 MMOL/L (ref 136–145)
VARIANT LYMPHS NFR BLD MANUAL: 13.1 % (ref 19.6–45.3)
WBC MORPH BLD: NORMAL
WBC NRBC COR # BLD: 35.18 10*3/MM3 (ref 3.4–10.8)

## 2023-05-09 PROCEDURE — 63710000001 RUXOLITINIB 15 MG TABLET: Performed by: INTERNAL MEDICINE

## 2023-05-09 PROCEDURE — 85007 BL SMEAR W/DIFF WBC COUNT: CPT | Performed by: INTERNAL MEDICINE

## 2023-05-09 PROCEDURE — 80048 BASIC METABOLIC PNL TOTAL CA: CPT | Performed by: INTERNAL MEDICINE

## 2023-05-09 PROCEDURE — 85025 COMPLETE CBC W/AUTO DIFF WBC: CPT | Performed by: INTERNAL MEDICINE

## 2023-05-09 PROCEDURE — 97116 GAIT TRAINING THERAPY: CPT

## 2023-05-09 PROCEDURE — C9399 UNCLASSIFIED DRUGS OR BIOLOG: HCPCS | Performed by: INTERNAL MEDICINE

## 2023-05-09 PROCEDURE — 99232 SBSQ HOSP IP/OBS MODERATE 35: CPT | Performed by: NURSE PRACTITIONER

## 2023-05-09 PROCEDURE — 97530 THERAPEUTIC ACTIVITIES: CPT

## 2023-05-09 RX ORDER — FUROSEMIDE 40 MG/1
40 TABLET ORAL DAILY
Status: DISCONTINUED | OUTPATIENT
Start: 2023-05-09 | End: 2023-05-16 | Stop reason: HOSPADM

## 2023-05-09 RX ADMIN — SERTRALINE 100 MG: 100 TABLET, FILM COATED ORAL at 09:03

## 2023-05-09 RX ADMIN — FAMOTIDINE 20 MG: 20 TABLET, FILM COATED ORAL at 09:03

## 2023-05-09 RX ADMIN — CLOPIDOGREL BISULFATE 75 MG: 75 TABLET, FILM COATED ORAL at 09:03

## 2023-05-09 RX ADMIN — SODIUM BICARBONATE 650 MG: 650 TABLET ORAL at 09:03

## 2023-05-09 RX ADMIN — Medication 250 MG: at 09:02

## 2023-05-09 RX ADMIN — ZINC OXIDE 1 APPLICATION: 200 OINTMENT TOPICAL at 09:11

## 2023-05-09 RX ADMIN — METOPROLOL SUCCINATE 25 MG: 25 TABLET, EXTENDED RELEASE ORAL at 20:48

## 2023-05-09 RX ADMIN — APIXABAN 5 MG: 5 TABLET, FILM COATED ORAL at 20:48

## 2023-05-09 RX ADMIN — RUXOLITINIB 15 MG: 15 TABLET ORAL at 09:21

## 2023-05-09 RX ADMIN — LATANOPROST 1 DROP: 50 SOLUTION OPHTHALMIC at 20:49

## 2023-05-09 RX ADMIN — CHOLESTYRAMINE 1 PACKET: 4 POWDER, FOR SUSPENSION ORAL at 09:03

## 2023-05-09 RX ADMIN — CHOLESTYRAMINE 1 PACKET: 4 POWDER, FOR SUSPENSION ORAL at 20:48

## 2023-05-09 RX ADMIN — RUXOLITINIB 15 MG: 15 TABLET ORAL at 20:52

## 2023-05-09 RX ADMIN — ATORVASTATIN CALCIUM 80 MG: 80 TABLET, FILM COATED ORAL at 20:48

## 2023-05-09 RX ADMIN — ZINC OXIDE 1 APPLICATION: 200 OINTMENT TOPICAL at 20:49

## 2023-05-09 RX ADMIN — SODIUM BICARBONATE 650 MG: 650 TABLET ORAL at 20:48

## 2023-05-09 RX ADMIN — PETROLATUM 1 APPLICATION: 420 OINTMENT TOPICAL at 09:11

## 2023-05-09 RX ADMIN — SODIUM BICARBONATE 650 MG: 650 TABLET ORAL at 15:45

## 2023-05-09 RX ADMIN — FUROSEMIDE 40 MG: 40 TABLET ORAL at 13:33

## 2023-05-09 RX ADMIN — APIXABAN 5 MG: 5 TABLET, FILM COATED ORAL at 09:03

## 2023-05-09 RX ADMIN — METOPROLOL SUCCINATE 25 MG: 25 TABLET, EXTENDED RELEASE ORAL at 09:03

## 2023-05-09 RX ADMIN — Medication 250 MG: at 20:48

## 2023-05-09 NOTE — PLAN OF CARE
Goal Outcome Evaluation:  Plan of Care Reviewed With: patient        Progress: improving  Outcome Evaluation: Skin care done after each incontinent, brief frequently checked. VSS, will continue to monitor.

## 2023-05-09 NOTE — PROGRESS NOTES
Name: Catherine Boyer ADMIT: 5/3/2023   : 1940  PCP: Kristi Moreau APRN    MRN: [id number] LOS: 5 days   AGE/SEX: 83 y.o. female  ROOM: [facility]     Subjective   Subjective   No acute events. Patient overall feels much better. No more abdominal pain or diarrhea. Taking PO.  No CP/dyspnea/f/c.  No family at bedside.  Objective   Objective   Vital Signs  Temp:  [97.5 °F (36.4 °C)-98.8 °F (37.1 °C)] 98.8 °F (37.1 °C)  Heart Rate:  [76-90] 80  Resp:  [17-18] 18  BP: (103-134)/(56-80) 118/64  SpO2:  [94 %-98 %] 94 %  on  Flow (L/min):  [1-2] 1;   Device (Oxygen Therapy): nasal cannula  Body mass index is 27.24 kg/m².  Physical Exam  Vitals and nursing note reviewed.   Constitutional:       General: She is not in acute distress.     Appearance: She is not toxic-appearing or diaphoretic.   HENT:      Head: Normocephalic and atraumatic.      Nose: Nose normal.      Mouth/Throat:      Mouth: Mucous membranes are moist.      Pharynx: Oropharynx is clear.   Eyes:      Conjunctiva/sclera: Conjunctivae normal.      Pupils: Pupils are equal, round, and reactive to light.   Cardiovascular:      Rate and Rhythm: Normal rate and regular rhythm.      Pulses: Normal pulses.   Pulmonary:      Effort: Pulmonary effort is normal.      Breath sounds: Normal breath sounds.   Abdominal:      General: Bowel sounds are normal.      Palpations: Abdomen is soft.      Tenderness: There is no abdominal tenderness.   Musculoskeletal:         General: No swelling or tenderness.      Cervical back: Normal range of motion and neck supple.   Skin:     General: Skin is warm and dry.      Capillary Refill: Capillary refill takes less than 2 seconds.   Neurological:      General: No focal deficit present.      Mental Status: She is alert.   Psychiatric:         Mood and Affect: Mood normal.         Behavior: Behavior normal.       Results Review     I reviewed the patient's new clinical results.  Results from last 7 days   Lab Units  05/09/23 0345 05/08/23 0245 05/07/23 0526 05/06/23 0419   WBC 10*3/mm3 35.18* 28.80* 17.86* 39.61*   HEMOGLOBIN g/dL 8.6* 8.0* 7.6* 9.0*   PLATELETS 10*3/mm3 155 108* 129* 149     Results from last 7 days   Lab Units 05/09/23 0345 05/08/23 0245 05/07/23 0526 05/06/23 0419   SODIUM mmol/L 139 137 140 139   POTASSIUM mmol/L 3.7 3.4* 3.8 4.4   CHLORIDE mmol/L 100 105 111* 112*   CO2 mmol/L 28.0 25.7 22.3 20.2*   BUN mg/dL 11 11 12 16   CREATININE mg/dL 0.66 0.62 0.52* 0.65   GLUCOSE mg/dL 86 125* 97 88   EGFR mL/min/1.73 86.1 87.4 91.2 86.4     Results from last 7 days   Lab Units 05/03/23  1534   ALBUMIN g/dL 3.6   BILIRUBIN mg/dL 0.5   ALK PHOS U/L 282*   AST (SGOT) U/L 19   ALT (SGPT) U/L 25     Results from last 7 days   Lab Units 05/09/23 0345 05/08/23 0245 05/07/23 0526 05/06/23 0419 05/05/23  0902 05/05/23  0524 05/04/23  0350 05/03/23  1534   CALCIUM mg/dL 8.3* 8.4* 8.2* 8.2*  --    < > 8.0* 8.3*   ALBUMIN g/dL  --   --   --   --   --   --   --  3.6   MAGNESIUM mg/dL  --   --   --   --  1.7  --  1.7  --     < > = values in this interval not displayed.     Results from last 7 days   Lab Units 05/03/23  1534   LACTATE mmol/L 1.8     No results found for: HGBA1C, POCGLU    CT Angiogram Chest    Result Date: 5/7/2023  Bilateral pleural effusions with pulmonary edema and left lower lobe atelectasis or pneumonia. No CT evidence of pulmonary embolism or pulmonary arterial hypertension. Extensive atheromatous calcification of the coronary arteries.  This report was finalized on 5/7/2023 11:11 PM by Dr. Charles Tirado M.D.      I have personally reviewed all medications:  Scheduled Medications  apixaban, 5 mg, Oral, BID  atorvastatin, 80 mg, Oral, Nightly  cholestyramine, 1 packet, Oral, Q12H  clopidogrel, 75 mg, Oral, Daily  famotidine, 20 mg, Oral, Daily  furosemide, 40 mg, Oral, Daily  hydrocortisone-bacitracin-zinc oxide-nystatin, 1 application, Topical, BID  latanoprost, 1 drop, Both Eyes,  Nightly  metoprolol succinate XL, 25 mg, Oral, BID  Petrolatum, 1 application, Topical, Q24H  ruxolitinib, 15 mg, Oral, BID  saccharomyces boulardii, 250 mg, Oral, BID  sertraline, 100 mg, Oral, Daily  sodium bicarbonate, 650 mg, Oral, TID    Infusions   Diet  Diet: Regular/House Diet, Gastrointestinal Diets; Low Irritant; Texture: Regular Texture (IDDSI 7); Fluid Consistency: Thin (IDDSI 0)    I have personally reviewed:  [x]  Laboratory   []  Microbiology   []  Radiology   [x]  EKG/Telemetry  []  Cardiology/Vascular   []  Pathology    []  Records    Assessment/Plan     Active Hospital Problems    Diagnosis  POA   • **Rotavirus enteritis [A08.0]  Unknown   • Moderate Malnutrition (HCC) [E44.0]  Yes   • 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 [Z22.1]  Not Applicable   • KARLI (acute kidney injury) [N17.9]  Unknown   • Acute renal failure, unspecified acute renal failure type [N17.9]  Yes   • Epigastric abdominal pain [R10.13]  Yes   • Acute on chronic diastolic heart failure [I50.33]  No   • Hypertension [I10]  Yes   • CAD (coronary artery disease) [I25.10]  Yes   • Myeloproliferative disorder [D47.1]  Yes   • Paroxysmal atrial fibrillation [I48.0]  Yes   • Type 2 diabetes mellitus with circulatory disorder, 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   Rotavirus Enteritis  - improving overall but she does appear to have some diarrhea with diet advancement, likely postinfectious  - continue low irritant diet  - will start on cholestyramine and PRN imodium  - Cdiff PCR is positive with a negative antigen, indicating colonization rather than infection-no specific treatment for this is indicated    KARLI  - secondary to above, resolved with IVF    Chest Pain/Dyspnea/Acute on Chronic Diastolic CHF/CAD  - CP and dyspnea resolved; troponin elevation flat and non-specific  - volume status improved with IV lasix, resume PO dosing  - appreciate cardiology  recs    PAF  - rate controlled, continue metoprolol  - continue AC with eliquis    Type 2 DM  - BG acceptable, no need for insulin coverage at this time    Myeloproliferative Disorder  - has chronically elevated WBC and anemia and these values have been consistent with her baseline  - on Jakafi    Abnormal UA  - looks contaminated  - no UTI symptoms and clinically improving without antibiotics  - no specific treatment for this is indicated    Eliquis (home med) for DVT prophylaxis.  DNR.  Discussed with patient, nursing staff and care team on multidisciplinary rounds.  Anticipate discharge to SNU facility once precert has been obtained.      Kenton Meeks MD  [facility]  05/09/23  14:34 EDT

## 2023-05-09 NOTE — THERAPY TREATMENT NOTE
Patient Name: Catherine Boyer  : 1940    MRN: [id number]                              Today's Date: 2023       Admit Date: 5/3/2023    Visit Dx:     ICD-10-CM ICD-9-CM   1. Acute renal failure, unspecified acute renal failure type  N17.9 584.9   2. Hypokalemia  E87.6 276.8   3. Nausea vomiting and diarrhea  R11.2 787.91    R19.7 787.01   4. Weight loss  R63.4 783.21     Patient Active Problem List   Diagnosis   • CAD (coronary artery disease)   • Nonbacterial thrombotic endocarditis   • Paroxysmal atrial fibrillation   • Myeloproliferative disorder   • Microcytic anemia   • Type 2 diabetes mellitus with circulatory disorder, without long-term current use of insulin   • GERD (gastroesophageal reflux disease)   • Hypertension   • Personal history of transient ischemic attack (TIA), 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 deficits   • Porcelain gallbladder   • Breast cancer   • Chronic diastolic (congestive) heart failure   • Cholecystitis   • Urinary tract infection without hematuria, site unspecified   • Epigastric abdominal pain   • Gastritis   • Atherosclerosis of abdominal aorta   • APC (atrial premature contractions)   • Rotavirus enteritis   • KARLI (acute kidney injury)   • Acute renal failure, unspecified acute renal failure type   • Moderate Malnutrition (HCC)   • 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     Past Medical History:   Diagnosis Date   • Atherosclerosis of abdominal aorta 2023   • Atrial fibrillation    • Breast cancer    • CAD (coronary artery disease)     NSTEMI 2022: 90% ostial LAD, 99% D1, 70% mid-distal LAD (medical therapy). She received two stents (2.5x18 and 2.5x26mm [place] LEFTY) but I don't know which one went to which lesion.   • Carotid atherosclerosis    • Chronic diastolic (congestive) heart failure    • COVID 10/29/2022   • GERD (gastroesophageal reflux disease)    • Glaucoma    • History of cataract    • Hypertension    • Microcytic anemia     per Dr. oleg pate office  note  6/30/22-dd   • Myeloproliferative disorder     JAK2 positive   • Nonbacterial thrombotic endocarditis     6/2021: 4x5mm vegetation on the ventricular surface of the anterior MV, negative blood cultures   • Porcelain gallbladder    • PUD (peptic ulcer disease)    • TIA (transient ischemic attack)    • Type 2 diabetes mellitus    • Type 2 diabetes mellitus with circulatory disorder, without long-term current use of insulin 7/14/2022   • Upper GI bleed      Past Surgical History:   Procedure Laterality Date   • BREAST LUMPECTOMY  1999   • CARDIAC CATHETERIZATION N/A 09/02/2022    Procedure: Coronary angiography;  Surgeon: Guero Verde MD;  Location: [facility] CATH INVASIVE LOCATION;  Service: Cardiovascular;  Laterality: N/A;   • CARDIAC CATHETERIZATION N/A 09/02/2022    Procedure: Stent LEFTY coronary;  Surgeon: Guero Verde MD;  Location: [facility] CATH INVASIVE LOCATION;  Service: Cardiovascular;  Laterality: N/A;   • CATARACT EXTRACTION  2011   • CHOLECYSTECTOMY     • CHOLECYSTECTOMY WITH INTRAOPERATIVE CHOLANGIOGRAM N/A 11/28/2022    Procedure: CHOLECYSTECTOMY LAPAROSCOPIC INTRAOPERATIVE CHOLANGIOGRAM;  Surgeon: Dani Grover Jr., MD;  Location: [facility] MAIN OR;  Service: General;  Laterality: N/A;   • COLONOSCOPY N/A 02/09/2023    Procedure: COLONOSCOPY TO CECUM & T.I.;  Surgeon: Guero Ferris MD;  Location: [facility] ENDOSCOPY;  Service: Gastroenterology;  Laterality: N/A;  PRE- MELENA, GI BLEED  POST- DIVERTICULOSIS, INT HEMORRHOIDS   • ENDOSCOPY N/A 01/25/2023    Procedure: ESOPHAGOGASTRODUODENOSCOPY WITH BIOPSIES;  Surgeon: Charles Diaz MD;  Location: [facility] ENDOSCOPY;  Service: Gastroenterology;  Laterality: N/A;  pre: abd pain, nausea  post: hiatal hernia, mild gastritis, sloughing of the esophagus   • ENTEROSCOPY SMALL BOWEL N/A 02/09/2023    Procedure: ENTEROSCOPY SMALL BOWEL;  Surgeon: Guero Ferris MD;  Location: [facility] ENDOSCOPY;  Service: Gastroenterology;  Laterality: N/A;  PRE- MELENA, GI  BLEED  POST- HIATAL HERNIA   • JOINT REPLACEMENT     • UPPER GASTROINTESTINAL ENDOSCOPY        General Information     Row Name 05/09/23 1023          Physical Therapy Time and Intention    Document Type therapy note (daily note)  -ST     Mode of Treatment physical therapy  -     Row Name 05/09/23 1023          General Information    Patient Profile Reviewed yes  -ST     Existing Precautions/Restrictions oxygen therapy device and L/min;fall  -     Row Name 05/09/23 1023          Cognition    Orientation Status (Cognition) oriented x 3  -ST     [facility] Name 05/09/23 1023          Safety Issues, Functional Mobility    Impairments Affecting Function (Mobility) endurance/activity tolerance;shortness of breath;strength  -ST     Comment, Safety Issues/Impairments (Mobility) gait belt, nonskid socks donned  -ST           User Key  (r) = Recorded By, (t) = Taken By, (c) = Cosigned By    Initials Name Provider Type    ST Tiana Pressley PT Physical Therapist               Mobility     Row Name 05/09/23 1024 05/09/23 1023       Bed Mobility    Bed Mobility rolling right;rolling left  -ST supine-sit;sit-supine  -ST    Rolling Left [place] (Bed Mobility) standby assist  -ST --    Rolling Right [place] (Bed Mobility) standby assist  -ST --    Supine-Sit [place] (Bed Mobility) -- standby assist;verbal cues  -ST    Sit-Supine [place] (Bed Mobility) -- standby assist  -ST    Assistive Device (Bed Mobility) -- bed rails;head of bed elevated  -ST    Comment, (Bed Mobility) -- increased time  -    Row Name 05/09/23 1023          Sit-Stand Transfer    Sit-Stand [place] (Transfers) standby assist;contact guard;verbal cues  -ST     Assistive Device (Sit-Stand Transfers) walker, front-wheeled  -ST     Row Name 05/09/23 1023          Gait/Stairs (Locomotion)    [place] Level (Gait) contact guard  -ST     Assistive Device (Gait) walker, front-wheeled  -     Distance in Feet (Gait) 50'  -ST      Deviations/Abnormal Patterns (Gait) bilateral deviations;jeanna decreased;gait speed decreased;stride length decreased  -ST     Bilateral Gait Deviations forward flexed posture;heel strike decreased  -ST     Comment, (Gait/Stairs) fatigues quickly with ambulation, frequent stand rest breaks needed  -ST           User Key  (r) = Recorded By, (t) = Taken By, (c) = Cosigned By    Initials Name Provider Type    ST Tiana Pressley, PT Physical Therapist               Obj/Interventions     Row Name 05/09/23 1025          Balance    Comment, Balance no LOB, CGA for dynamic balance at this time  -ST           User Key  (r) = Recorded By, (t) = Taken By, (c) = Cosigned By    Initials Name Provider Type    ST Tiana Pressley, PT Physical Therapist               Goals/Plan    No documentation.                Clinical Impression     [facility] Name 05/09/23 1025          Pain    Pretreatment Pain Rating 0/10 - no pain  -ST     Posttreatment Pain Rating 0/10 - no pain  -ST     Row Name 05/09/23 1025          Plan of Care Review    Plan of Care Reviewed With patient  -ST     Progress no change  -ST     Outcome Evaluation pt seen for PT this AM. continues to be significantly limited by fatigue. Ambulated 50' with rwx but frequent standing rest breaks needed. pt on RA throughout, sats 88% and above. facilitated repetitive bed mobility while pericare performed as pt has BM during session. She continues to benefit from skilled PT to address balance, endurance, strength. Pt may benefit from SNU at this time as she is indep at baseline and currently challenged with household distance negotiation.  -     Row Name 05/09/23 1025          Therapy Assessment/Plan (PT)    Rehab Potential (PT) good, to achieve stated therapy goals  -ST     Criteria for Skilled Interventions Met (PT) yes  -ST     Therapy Frequency (PT) 5 times/wk  -     Row Name 05/09/23 1025          Positioning and Restraints    Pre-Treatment Position in bed  -ST      Post Treatment Position bed  -ST     In Bed supine;call light within reach;encouraged to call for assist;exit alarm on;notified nsg  -ST           User Key  (r) = Recorded By, (t) = Taken By, (c) = Cosigned By    Initials Name Provider Type    Tiana Lr PT Physical Therapist               Outcome Measures     Row Name 05/09/23 1028          How much help from another person do you currently need...    Turning from your back to your side while in flat bed without using bedrails? 4  -ST     Moving from lying on back to sitting on the side of a flat bed without bedrails? 3  -ST     Moving to and from a bed to a chair (including a wheelchair)? 3  -ST     Standing up from a chair using your arms (e.g., wheelchair, bedside chair)? 3  -ST     Climbing 3-5 steps with a railing? 2  -ST     To walk in hospital room? 3  -ST     AM-PAC 6 Clicks Score (PT) 18  -ST     Highest level of mobility 6 --> Walked 10 steps or more  -     Row Name 05/09/23 1028          Functional Assessment    Outcome Measure Options AM-PAC 6 Clicks Basic Mobility (PT)  -ST           User Key  (r) = Recorded By, (t) = Taken By, (c) = Cosigned By    Initials Name Provider Type    Tiana Lr PT Physical Therapist                             Physical Therapy Education     Title: PT OT SLP Therapies (Done)     Topic: Physical Therapy (Done)     Point: Mobility training (Done)     Learning Progress Summary           Patient Acceptance, E,TB, VU,NR by  at 5/9/2023 1029    Acceptance, E, VU by LW at 5/7/2023 1445                   Point: Home exercise program (Done)     Learning Progress Summary           Patient Acceptance, E, VU by LW at 5/7/2023 1445                   Point: Body mechanics (Done)     Learning Progress Summary           Patient Acceptance, E,TB, VU,NR by  at 5/9/2023 1029    Acceptance, E, VU by LW at 5/7/2023 1445                   Point: Precautions (Done)     Learning Progress Summary           Patient  Acceptance, E, VU by  at 5/7/2023 1445                               User Key     Initials Effective Dates Name Provider Type Discipline     06/10/22 -  Bri Reynolds, PT Physical Therapist PT     09/22/22 -  Tiana Pressley PT Physical Therapist PT              PT Recommendation and Plan     Plan of Care Reviewed With: patient  Progress: no change  Outcome Evaluation: pt seen for PT this AM. continues to be significantly limited by fatigue. Ambulated 50' with rwx but frequent standing rest breaks needed. pt on RA throughout, sats 88% and above. facilitated repetitive bed mobility while pericare performed as pt has BM during session. She continues to benefit from skilled PT to address balance, endurance, strength. Pt may benefit from SNU at this time as she is indep at baseline and currently challenged with household distance negotiation.     Time Calculation:    PT Charges     Row Name 05/09/23 1029             Time Calculation    Start Time 0924  -ST      Stop Time 0950  -ST      Time Calculation (min) 26 min  -ST      PT Received On 05/09/23  -ST      PT - Next Appointment 05/10/23  -ST         Time Calculation- PT    Total Timed Code Minutes- PT 26 minute(s)  -ST         Timed Charges    57569 - Gait Training Minutes  8  -ST      56071 - PT Therapeutic Activity Minutes 18  -ST         Total Minutes    Timed Charges Total Minutes 26  -ST       Total Minutes 26  -ST            User Key  (r) = Recorded By, (t) = Taken By, (c) = Cosigned By    Initials Name Provider Type    ST Tiana Pressley PT Physical Therapist              Therapy Charges for Today     Code Description Service Date Service Provider Modifiers Qty    85238786433 HC GAIT TRAINING EA 15 MIN 5/9/2023 Tiana Pressley, PT GP 1    72688517239 HC PT THERAPEUTIC ACT EA 15 MIN 5/9/2023 Tiana Pressley, PT GP 1          PT G-Codes  Outcome Measure Options: AM-PAC 6 Clicks Basic Mobility (PT)  AM-PAC 6 Clicks Score (PT): 18  PT  Discharge Summary  Anticipated Discharge Disposition (PT): skilled nursing facility    Tiana Pressley, PT  5/9/2023

## 2023-05-09 NOTE — PLAN OF CARE
Goal Outcome Evaluation:  Plan of Care Reviewed With: patient          Problem: Adult Inpatient Plan of Care  Goal: Plan of Care Review  Outcome: Ongoing, Progressing  Flowsheets (Taken 5/9/2023 9352)  Progress: improving  Plan of Care Reviewed With: patient  Outcome Evaluation: Vitals stable. Pt maintaining O2 sat on 0.5L NC. Pt worked w PT this am. Contact spore isolation maintained. Pt needs met and questions answered. Will continue to monitor.

## 2023-05-09 NOTE — PLAN OF CARE
Goal Outcome Evaluation:  Plan of Care Reviewed With: patient        Progress: no change  Outcome Evaluation: pt seen for PT this AM. continues to be significantly limited by fatigue. Ambulated 50' with rwx but frequent standing rest breaks needed. pt on RA throughout, sats 88% and above. facilitated repetitive bed mobility while pericare performed as pt has BM during session. She continues to benefit from skilled PT to address balance, endurance, strength. Pt may benefit from SNU at this time as she is indep at baseline and currently challenged with household distance negotiation.

## 2023-05-09 NOTE — CASE MANAGEMENT/SOCIAL WORK
Continued Stay Note  [facility]     Patient Name: Catherine Boyer  MRN: [id number]  Today's Date: 5/9/2023    Admit Date: 5/3/2023    Plan: [facility] pending pre cert (Started 5/9). Per [employer], delay in starting pre cert due to [employer]'s system being down.   Discharge Plan     Row Name 05/09/23 1722       Plan    Plan [facility] pending pre cert (Started 5/9). Per [employer], delay in starting pre cert due to [employer]'s system being down.    Patient/Family in Agreement with Plan yes    Plan Comments Per [employer], [employer]'s system has been down which has caused delay in starting pre cert. Pre cert was started late this afternoon. Plan to D/C 1-2 days once pre cert obtained. Cassius Bates RN-BC               Discharge Codes    No documentation.               Expected Discharge Date and Time     Expected Discharge Date Expected Discharge Time    May 10, 2023             Cassius Bates RN

## 2023-05-09 NOTE — PROGRESS NOTES
"    Patient Name: Catherine Boyer  :1940  83 y.o.      Patient Care Team:  Kristi Moreau APRN as PCP - General (Nurse Practitioner)  Gerard Foreman MD as Referring Physician (Medical Oncology)  Darrell Reinoso MD as Consulting Physician (Hematology and Oncology)  Albin Barriga MD as Cardiologist (Cardiology)  Richard Aldana, RN as     Chief Complaint: follow up paroxysmal atrial fibrillation, coronary artery disease, heart failure    Interval History: she feels much better        Objective   Vital Signs  Temp:  [97.5 °F (36.4 °C)-98.2 °F (36.8 °C)] 97.5 °F (36.4 °C)  Heart Rate:  [76-90] 76  Resp:  [17-18] 18  BP: (103-134)/(56-80) 118/71    Intake/Output Summary (Last 24 hours) at 2023 1208  Last data filed at 2023 0721  Gross per 24 hour   Intake 1045 ml   Output 300 ml   Net 745 ml     Flowsheet Rows    Flowsheet Row First Filed Value   Admission Height 154.9 cm (61\") Documented at 2023 1540   Admission Weight 53.5 kg (118 lb) Documented at 2023 1540          Physical Exam:   General Appearance:    Alert, cooperative, in no acute distress   Lungs:     Clear to auscultation.  Normal respiratory effort and rate.      Heart:    Regular rhythm and normal rate, normal S1 and S2, no murmurs, gallops or rubs.     Chest Wall:    No abnormalities observed   Abdomen:     Soft, nontender, positive bowel sounds.     Extremities:   no cyanosis, clubbing or edema.  No marked joint deformities.  Adequate musculoskeletal strength.       Results Review:    Results from last 7 days   Lab Units 23  0345   SODIUM mmol/L 139   POTASSIUM mmol/L 3.7   CHLORIDE mmol/L 100   CO2 mmol/L 28.0   BUN mg/dL 11   CREATININE mg/dL 0.66   GLUCOSE mg/dL 86   CALCIUM mg/dL 8.3*     Results from last 7 days   Lab Units 23  1100 23  0902   HSTROP T ng/L 29* 32*     Results from last 7 days   Lab Units 23  0345   WBC 10*3/mm3 35.18*   HEMOGLOBIN g/dL 8.6*   HEMATOCRIT % 25.6*   PLATELETS " 10*3/mm3 155         Results from last 7 days   Lab Units 05/05/23  0902   MAGNESIUM mg/dL 1.7                   Medication Review:   apixaban, 5 mg, Oral, BID  atorvastatin, 80 mg, Oral, Nightly  cholestyramine, 1 packet, Oral, Q12H  clopidogrel, 75 mg, Oral, Daily  famotidine, 20 mg, Oral, Daily  hydrocortisone-bacitracin-zinc oxide-nystatin, 1 application, Topical, BID  latanoprost, 1 drop, Both Eyes, Nightly  metoprolol succinate XL, 25 mg, Oral, BID  Petrolatum, 1 application, Topical, Q24H  ruxolitinib, 15 mg, Oral, BID  saccharomyces boulardii, 250 mg, Oral, BID  sertraline, 100 mg, Oral, Daily  sodium bicarbonate, 650 mg, Oral, TID              Assessment & Plan   1.  Rotavirus enteritis with nausea, vomiting, and diarrhea  2.  Acute kidney injury improved  3.  Chest pain with nonspecific troponin elevation  4.  Myeloproliferative disorder with significantly elevated white blood cell count  5.  Multifactorial anemia  6.  Coronary artery disease, status post LEFTY to the LAD and diagonal in February 2022.  99% in-stent restenosis of the diagonal stent with placement of a new stent on 9/2/2022 at [facility].  Also found to have 60% proximal to mid LAD disease and 100% occlusion of the mid RCA with left-to-right collaterals at that time.  7.  Ejection fraction 50 to 55% - acute on chronic diastolic CHF. CT chest with pulmonary edema. Status post IV lasix   8.  Paroxysmal atrial fibrillation  9.  History of TIA  10.  Mild aortic stenosis  11.  Elevated RVSP of 62 mm Hg on echo  12.  Diabetes    Resume oral lasix.     ABEL Carbajal  [facility]  05/09/23  12:08 EDT

## 2023-05-10 LAB
ANION GAP SERPL CALCULATED.3IONS-SCNC: 9 MMOL/L (ref 5–15)
BASOPHILS # BLD MANUAL: 0.37 10*3/MM3 (ref 0–0.2)
BASOPHILS NFR BLD MANUAL: 1 % (ref 0–1.5)
BUN SERPL-MCNC: 10 MG/DL (ref 8–23)
BUN/CREAT SERPL: 19.6 (ref 7–25)
CALCIUM SPEC-SCNC: 9 MG/DL (ref 8.6–10.5)
CHLORIDE SERPL-SCNC: 100 MMOL/L (ref 98–107)
CO2 SERPL-SCNC: 31 MMOL/L (ref 22–29)
CREAT SERPL-MCNC: 0.51 MG/DL (ref 0.57–1)
DEPRECATED RDW RBC AUTO: 55.4 FL (ref 37–54)
EGFRCR SERPLBLD CKD-EPI 2021: 91.6 ML/MIN/1.73
ERYTHROCYTE [DISTWIDTH] IN BLOOD BY AUTOMATED COUNT: 16.3 % (ref 12.3–15.4)
GLUCOSE SERPL-MCNC: 84 MG/DL (ref 65–99)
HCT VFR BLD AUTO: 26.7 % (ref 34–46.6)
HGB BLD-MCNC: 8.8 G/DL (ref 12–15.9)
LYMPHOCYTES # BLD MANUAL: 2.27 10*3/MM3 (ref 0.7–3.1)
LYMPHOCYTES NFR BLD MANUAL: 6.1 % (ref 5–12)
MCH RBC QN AUTO: 30.8 PG (ref 26.6–33)
MCHC RBC AUTO-ENTMCNC: 33 G/DL (ref 31.5–35.7)
MCV RBC AUTO: 93.4 FL (ref 79–97)
MONOCYTES # BLD: 2.27 10*3/MM3 (ref 0.1–0.9)
MYELOCYTES NFR BLD MANUAL: 3.1 % (ref 0–0)
NEUTROPHILS # BLD AUTO: 31.2 10*3/MM3 (ref 1.7–7)
NEUTROPHILS NFR BLD MANUAL: 83.7 % (ref 42.7–76)
PLAT MORPH BLD: NORMAL
PLATELET # BLD AUTO: 181 10*3/MM3 (ref 140–450)
PMV BLD AUTO: 11.9 FL (ref 6–12)
POTASSIUM SERPL-SCNC: 4 MMOL/L (ref 3.5–5.2)
RBC # BLD AUTO: 2.86 10*6/MM3 (ref 3.77–5.28)
RBC MORPH BLD: NORMAL
SODIUM SERPL-SCNC: 140 MMOL/L (ref 136–145)
VARIANT LYMPHS NFR BLD MANUAL: 6.1 % (ref 19.6–45.3)
WBC MORPH BLD: NORMAL
WBC NRBC COR # BLD: 37.28 10*3/MM3 (ref 3.4–10.8)

## 2023-05-10 PROCEDURE — 80048 BASIC METABOLIC PNL TOTAL CA: CPT | Performed by: INTERNAL MEDICINE

## 2023-05-10 PROCEDURE — 97110 THERAPEUTIC EXERCISES: CPT

## 2023-05-10 PROCEDURE — 85025 COMPLETE CBC W/AUTO DIFF WBC: CPT | Performed by: INTERNAL MEDICINE

## 2023-05-10 PROCEDURE — 63710000001 RUXOLITINIB 15 MG TABLET: Performed by: INTERNAL MEDICINE

## 2023-05-10 PROCEDURE — C9399 UNCLASSIFIED DRUGS OR BIOLOG: HCPCS | Performed by: INTERNAL MEDICINE

## 2023-05-10 PROCEDURE — 85007 BL SMEAR W/DIFF WBC COUNT: CPT | Performed by: INTERNAL MEDICINE

## 2023-05-10 PROCEDURE — 63710000001 ONDANSETRON PER 8 MG: Performed by: INTERNAL MEDICINE

## 2023-05-10 PROCEDURE — 99231 SBSQ HOSP IP/OBS SF/LOW 25: CPT | Performed by: NURSE PRACTITIONER

## 2023-05-10 RX ADMIN — ZINC OXIDE 1 APPLICATION: 200 OINTMENT TOPICAL at 08:22

## 2023-05-10 RX ADMIN — RUXOLITINIB 15 MG: 15 TABLET ORAL at 21:43

## 2023-05-10 RX ADMIN — CHOLESTYRAMINE 1 PACKET: 4 POWDER, FOR SUSPENSION ORAL at 21:42

## 2023-05-10 RX ADMIN — CLOPIDOGREL BISULFATE 75 MG: 75 TABLET, FILM COATED ORAL at 08:15

## 2023-05-10 RX ADMIN — SODIUM BICARBONATE 650 MG: 650 TABLET ORAL at 15:05

## 2023-05-10 RX ADMIN — Medication 3 MG: at 21:42

## 2023-05-10 RX ADMIN — SODIUM BICARBONATE 650 MG: 650 TABLET ORAL at 21:42

## 2023-05-10 RX ADMIN — APIXABAN 5 MG: 5 TABLET, FILM COATED ORAL at 21:42

## 2023-05-10 RX ADMIN — SERTRALINE 100 MG: 100 TABLET, FILM COATED ORAL at 08:14

## 2023-05-10 RX ADMIN — ONDANSETRON HYDROCHLORIDE 4 MG: 4 TABLET, FILM COATED ORAL at 21:42

## 2023-05-10 RX ADMIN — Medication 250 MG: at 08:15

## 2023-05-10 RX ADMIN — APIXABAN 5 MG: 5 TABLET, FILM COATED ORAL at 08:15

## 2023-05-10 RX ADMIN — RUXOLITINIB 15 MG: 15 TABLET ORAL at 08:21

## 2023-05-10 RX ADMIN — METOPROLOL SUCCINATE 25 MG: 25 TABLET, EXTENDED RELEASE ORAL at 08:15

## 2023-05-10 RX ADMIN — LATANOPROST 1 DROP: 50 SOLUTION OPHTHALMIC at 21:46

## 2023-05-10 RX ADMIN — ATORVASTATIN CALCIUM 80 MG: 80 TABLET, FILM COATED ORAL at 21:42

## 2023-05-10 RX ADMIN — ZINC OXIDE 1 APPLICATION: 200 OINTMENT TOPICAL at 21:46

## 2023-05-10 RX ADMIN — SODIUM BICARBONATE 650 MG: 650 TABLET ORAL at 08:15

## 2023-05-10 RX ADMIN — FAMOTIDINE 20 MG: 20 TABLET, FILM COATED ORAL at 08:15

## 2023-05-10 RX ADMIN — CHOLESTYRAMINE 1 PACKET: 4 POWDER, FOR SUSPENSION ORAL at 08:15

## 2023-05-10 RX ADMIN — FUROSEMIDE 40 MG: 40 TABLET ORAL at 08:14

## 2023-05-10 RX ADMIN — METOPROLOL SUCCINATE 25 MG: 25 TABLET, EXTENDED RELEASE ORAL at 21:42

## 2023-05-10 RX ADMIN — PETROLATUM 1 APPLICATION: 420 OINTMENT TOPICAL at 08:22

## 2023-05-10 RX ADMIN — ACETAMINOPHEN 650 MG: 325 TABLET, FILM COATED ORAL at 21:42

## 2023-05-10 RX ADMIN — Medication 250 MG: at 21:42

## 2023-05-10 NOTE — PROGRESS NOTES
"    Patient Name: Catherine Boyer  :1940  83 y.o.      Patient Care Team:  Kristi Moreau APRN as PCP - General (Nurse Practitioner)  Gerard Foreman MD as Referring Physician (Medical Oncology)  Darrell Reinoso MD as Consulting Physician (Hematology and Oncology)  Albin Barriga MD as Cardiologist (Cardiology)  Richard Aldana, RN as     Chief Complaint: follow up paroxysmal atrial fibrillation, coronary artery disease, heart failure    Interval History: she feels really good today.       Objective   Vital Signs  Temp:  [97.9 °F (36.6 °C)-98.8 °F (37.1 °C)] 97.9 °F (36.6 °C)  Heart Rate:  [80-86] 83  Resp:  [18] 18  BP: (115-145)/(64-81) 145/81    Intake/Output Summary (Last 24 hours) at 5/10/2023 1227  Last data filed at 5/10/2023 1000  Gross per 24 hour   Intake 480 ml   Output 450 ml   Net 30 ml     Flowsheet Rows    Flowsheet Row First Filed Value   Admission Height 154.9 cm (61\") Documented at 2023 1540   Admission Weight 53.5 kg (118 lb) Documented at 2023 1540          Physical Exam:   General Appearance:    Alert, cooperative, in no acute distress   Lungs:     Clear to auscultation.  Normal respiratory effort and rate.      Heart:    Regular rhythm and normal rate, normal S1 and S2, no murmurs, gallops or rubs.     Chest Wall:    No abnormalities observed   Abdomen:     Soft, nontender, positive bowel sounds.     Extremities:   no cyanosis, clubbing or edema.  No marked joint deformities.  Adequate musculoskeletal strength.       Results Review:    Results from last 7 days   Lab Units 05/10/23  0603   SODIUM mmol/L 140   POTASSIUM mmol/L 4.0   CHLORIDE mmol/L 100   CO2 mmol/L 31.0*   BUN mg/dL 10   CREATININE mg/dL 0.51*   GLUCOSE mg/dL 84   CALCIUM mg/dL 9.0     Results from last 7 days   Lab Units 23  1100 23  0902   HSTROP T ng/L 29* 32*     Results from last 7 days   Lab Units 05/10/23  0603   WBC 10*3/mm3 37.28*   HEMOGLOBIN g/dL 8.8*   HEMATOCRIT % 26.7* "   PLATELETS 10*3/mm3 181         Results from last 7 days   Lab Units 05/05/23  0902   MAGNESIUM mg/dL 1.7                   Medication Review:   apixaban, 5 mg, Oral, BID  atorvastatin, 80 mg, Oral, Nightly  cholestyramine, 1 packet, Oral, Q12H  clopidogrel, 75 mg, Oral, Daily  famotidine, 20 mg, Oral, Daily  furosemide, 40 mg, Oral, Daily  hydrocortisone-bacitracin-zinc oxide-nystatin, 1 application, Topical, BID  latanoprost, 1 drop, Both Eyes, Nightly  metoprolol succinate XL, 25 mg, Oral, BID  Petrolatum, 1 application, Topical, Q24H  ruxolitinib, 15 mg, Oral, BID  saccharomyces boulardii, 250 mg, Oral, BID  sertraline, 100 mg, Oral, Daily  sodium bicarbonate, 650 mg, Oral, TID              Assessment & Plan   1.  Rotavirus enteritis with nausea, vomiting, and diarrhea  2.  Acute kidney injury improved  3.  Chest pain with nonspecific troponin elevation  4.  Myeloproliferative disorder with significantly elevated white blood cell count  5.  Multifactorial anemia  6.  Coronary artery disease, status post LEFTY to the LAD and diagonal in February 2022.  99% in-stent restenosis of the diagonal stent with placement of a new stent on 9/2/2022 at [facility].  Also found to have 60% proximal to mid LAD disease and 100% occlusion of the mid RCA with left-to-right collaterals at that time.  7.  Ejection fraction 50 to 55% - acute on chronic diastolic CHF. CT chest with pulmonary edema. Status post IV lasix   8.  Paroxysmal atrial fibrillation  9.  History of TIA  10.  Mild aortic stenosis  11.  Elevated RVSP of 62 mm Hg on echo  12.  Diabetes    Doing well on oral lasix.   Nothing further to add from cardiac standpoint. Keep routine follow up in office with Dr. Barriga. .    Will see as needed.     ABEL Carbajal  [facility]  05/10/23  12:27 EDT

## 2023-05-10 NOTE — THERAPY TREATMENT NOTE
Patient Name: Catherine Boyer  : 1940    MRN: [id number]                              Today's Date: 5/10/2023       Admit Date: 5/3/2023    Visit Dx:     ICD-10-CM ICD-9-CM   1. Acute renal failure, unspecified acute renal failure type  N17.9 584.9   2. Hypokalemia  E87.6 276.8   3. Nausea vomiting and diarrhea  R11.2 787.91    R19.7 787.01   4. Weight loss  R63.4 783.21     Patient Active Problem List   Diagnosis   • CAD (coronary artery disease)   • Nonbacterial thrombotic endocarditis   • Paroxysmal atrial fibrillation   • Myeloproliferative disorder   • Microcytic anemia   • Type 2 diabetes mellitus with circulatory disorder, without long-term current use of insulin   • GERD (gastroesophageal reflux disease)   • Hypertension   • Personal history of transient ischemic attack (TIA), 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 deficits   • Porcelain gallbladder   • Breast cancer   • Acute on chronic diastolic heart failure   • Cholecystitis   • Urinary tract infection without hematuria, site unspecified   • Epigastric abdominal pain   • Gastritis   • Atherosclerosis of abdominal aorta   • APC (atrial premature contractions)   • Rotavirus enteritis   • KARLI (acute kidney injury)   • Acute renal failure, unspecified acute renal failure type   • Moderate Malnutrition (HCC)   • 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     Past Medical History:   Diagnosis Date   • Atherosclerosis of abdominal aorta 2023   • Atrial fibrillation    • Breast cancer    • CAD (coronary artery disease)     NSTEMI 2022: 90% ostial LAD, 99% D1, 70% mid-distal LAD (medical therapy). She received two stents (2.5x18 and 2.5x26mm Finesse LEFTY) but I don't know which one went to which lesion.   • Carotid atherosclerosis    • Chronic diastolic (congestive) heart failure    • COVID 10/29/2022   • GERD (gastroesophageal reflux disease)    • Glaucoma    • History of cataract    • Hypertension    • Microcytic anemia     per Dr. oleg pate office  note  6/30/22-dd   • Myeloproliferative disorder     JAK2 positive   • Nonbacterial thrombotic endocarditis     6/2021: 4x5mm vegetation on the ventricular surface of the anterior MV, negative blood cultures   • Porcelain gallbladder    • PUD (peptic ulcer disease)    • TIA (transient ischemic attack)    • Type 2 diabetes mellitus    • Type 2 diabetes mellitus with circulatory disorder, without long-term current use of insulin 7/14/2022   • Upper GI bleed      Past Surgical History:   Procedure Laterality Date   • BREAST LUMPECTOMY  1999   • CARDIAC CATHETERIZATION N/A 09/02/2022    Procedure: Coronary angiography;  Surgeon: Guero Verde MD;  Location: [facility] CATH INVASIVE LOCATION;  Service: Cardiovascular;  Laterality: N/A;   • CARDIAC CATHETERIZATION N/A 09/02/2022    Procedure: Stent LEFTY coronary;  Surgeon: Guero Verde MD;  Location: [facility] CATH INVASIVE LOCATION;  Service: Cardiovascular;  Laterality: N/A;   • CATARACT EXTRACTION  2011   • CHOLECYSTECTOMY     • CHOLECYSTECTOMY WITH INTRAOPERATIVE CHOLANGIOGRAM N/A 11/28/2022    Procedure: CHOLECYSTECTOMY LAPAROSCOPIC INTRAOPERATIVE CHOLANGIOGRAM;  Surgeon: Dani Grover Jr., MD;  Location: [facility] MAIN OR;  Service: General;  Laterality: N/A;   • COLONOSCOPY N/A 02/09/2023    Procedure: COLONOSCOPY TO CECUM & T.I.;  Surgeon: Guero Ferris MD;  Location: [facility] ENDOSCOPY;  Service: Gastroenterology;  Laterality: N/A;  PRE- MELENA, GI BLEED  POST- DIVERTICULOSIS, INT HEMORRHOIDS   • ENDOSCOPY N/A 01/25/2023    Procedure: ESOPHAGOGASTRODUODENOSCOPY WITH BIOPSIES;  Surgeon: Charles Diaz MD;  Location: [facility] ENDOSCOPY;  Service: Gastroenterology;  Laterality: N/A;  pre: abd pain, nausea  post: hiatal hernia, mild gastritis, sloughing of the esophagus   • ENTEROSCOPY SMALL BOWEL N/A 02/09/2023    Procedure: ENTEROSCOPY SMALL BOWEL;  Surgeon: Guero Ferris MD;  Location: [facility] ENDOSCOPY;  Service: Gastroenterology;  Laterality: N/A;  PRE- MELENA, GI  BLEED  POST- HIATAL HERNIA   • JOINT REPLACEMENT     • UPPER GASTROINTESTINAL ENDOSCOPY        General Information     Row Name 05/10/23 1632          Physical Therapy Time and Intention    Document Type therapy note (daily note)  -DB     Mode of Treatment physical therapy;individual therapy  -DB     Row Name 05/10/23 1632          General Information    Patient Profile Reviewed yes  -DB           User Key  (r) = Recorded By, (t) = Taken By, (c) = Cosigned By    Initials Name Provider Type    DB Kathryn Zimmerman, OZIEL Physical Therapist               Mobility     Row Name 05/10/23 1634          Bed Mobility    Bed Mobility supine-sit;sit-supine  -DB     Supine-Sit [place] (Bed Mobility) supervision  -DB     Sit-Supine [place] (Bed Mobility) supervision  -DB     Assistive Device (Bed Mobility) bed rails;head of bed elevated  -DB     Row Name 05/10/23 1634          Sit-Stand Transfer    Sit-Stand [place] (Transfers) contact guard  -DB     Comment, (Sit-Stand Transfer) HHA  -DB     Row Name 05/10/23 1634          Gait/Stairs (Locomotion)    [place] Level (Gait) contact guard  -DB     Assistive Device (Gait) other (see comments)  HHA  -DB     Distance in Feet (Gait) 3' side steps to HOB  -DB     Deviations/Abnormal Patterns (Gait) jeanna decreased;stride length decreased;gait speed decreased  -DB     Bilateral Gait Deviations forward flexed posture;heel strike decreased  -DB           User Key  (r) = Recorded By, (t) = Taken By, (c) = Cosigned By    Initials Name Provider Type    DB Kathryn Zimmerman, PT Physical Therapist               Obj/Interventions     Row Name 05/10/23 1637          Motor Skills    Therapeutic Exercise other (see comments)  seated MIP, LAQ, AP x 15 BLE  -DB     Row Name 05/10/23 1637          Balance    Balance Assessment sitting static balance;sitting dynamic balance;standing static balance;standing dynamic balance  -DB     Static Sitting Balance supervision  -DB     Dynamic  Sitting Balance supervision  -DB     Position, Sitting Balance unsupported;sitting edge of bed  -DB     Static Standing Balance contact guard  -DB     Dynamic Standing Balance contact guard  -DB     Position/Device Used, Standing Balance supported  -DB     Balance Interventions sitting;standing;sit to stand  -DB           User Key  (r) = Recorded By, (t) = Taken By, (c) = Cosigned By    Initials Name Provider Type    Kathryn Zaidi, PT Physical Therapist               Goals/Plan    No documentation.                Clinical Impression     Row Name 05/10/23 1637          Pain    Pain Intervention(s) Ambulation/increased activity;Repositioned  -DB     Row Name 05/10/23 1637          Plan of Care Review    Plan of Care Reviewed With patient  -DB     Progress improving  -DB     Outcome Evaluation Pt supine in bed at start of session. She declines ambulating today 2/2 fatigue but agreeable to perform LE ther ex EOB. Pt performs with SV for sitting balance, able to stand to get readjusted in bed with CGA. Pt continues to benefit from skilled PT intervention.  -DB     Row Name 05/10/23 1637          Vital Signs    Pre SpO2 (%) 92  -DB     O2 Delivery Pre Treatment supplemental O2  -DB     O2 Delivery Intra Treatment supplemental O2  -DB     Post SpO2 (%) 92  -DB     O2 Delivery Post Treatment supplemental O2  -DB     Pre Patient Position Supine  -DB     Intra Patient Position Standing  -DB     Post Patient Position Supine  -DB     Row Name 05/10/23 1637          Positioning and Restraints    Pre-Treatment Position in bed  -DB     Post Treatment Position bed  -DB     In Bed supine;call light within reach;encouraged to call for assist;exit alarm on;with family/caregiver;notified nsg  -DB           User Key  (r) = Recorded By, (t) = Taken By, (c) = Cosigned By    Initials Name Provider Type    Kathryn Zaidi, PT Physical Therapist               Outcome Measures     Row Name 05/10/23 1642          How much help from  another person do you currently need...    Turning from your back to your side while in flat bed without using bedrails? 4  -DB     Moving from lying on back to sitting on the side of a flat bed without bedrails? 3  -DB     Moving to and from a bed to a chair (including a wheelchair)? 3  -DB     Standing up from a chair using your arms (e.g., wheelchair, bedside chair)? 3  -DB     Climbing 3-5 steps with a railing? 2  -DB     To walk in hospital room? 3  -DB     AM-PAC 6 Clicks Score (PT) 18  -DB     Highest level of mobility 6 --> Walked 10 steps or more  -DB     Row Name 05/10/23 1642          Functional Assessment    Outcome Measure Options AM-PAC 6 Clicks Basic Mobility (PT)  -DB           User Key  (r) = Recorded By, (t) = Taken By, (c) = Cosigned By    Initials Name Provider Type    DB Kathryn Zimmerman, PT Physical Therapist                             Physical Therapy Education     Title: PT OT SLP Therapies (Done)     Topic: Physical Therapy (Done)     Point: Mobility training (Done)     Learning Progress Summary           Patient Acceptance, E, VU by DB at 5/10/2023 1643    Acceptance, E,TB, VU,NR by ST at 5/9/2023 1029    Acceptance, E, VU by LW at 5/7/2023 1445                   Point: Home exercise program (Done)     Learning Progress Summary           Patient Acceptance, E, VU by DB at 5/10/2023 1643    Acceptance, E, VU by LW at 5/7/2023 1445                   Point: Body mechanics (Done)     Learning Progress Summary           Patient Acceptance, E, VU by DB at 5/10/2023 1643    Acceptance, E,TB, VU,NR by ST at 5/9/2023 1029    Acceptance, E, VU by LW at 5/7/2023 1445                   Point: Precautions (Done)     Learning Progress Summary           Patient Acceptance, E, VU by DB at 5/10/2023 1643    Acceptance, E, VU by LW at 5/7/2023 1445                               User Key     Initials Effective Dates Name Provider Type Discipline    DB 06/16/21 -  Kathryn Zimmerman, PT Physical Therapist PT     LW 06/10/22 -  Bri Reynolds, PT Physical Therapist PT    ST 09/22/22 -  Tiana Pressley, PT Physical Therapist PT              PT Recommendation and Plan     Plan of Care Reviewed With: patient  Progress: improving  Outcome Evaluation: Pt supine in bed at start of session. She declines ambulating today 2/2 fatigue but agreeable to perform LE ther ex EOB. Pt performs with SV for sitting balance, able to stand to get readjusted in bed with CGA. Pt continues to benefit from skilled PT intervention.     Time Calculation:    PT Charges     Row Name 05/10/23 1643             Time Calculation    Start Time 1546  -DB      Stop Time 1557  -DB      Time Calculation (min) 11 min  -DB      PT Received On 05/10/23  -DB      PT - Next Appointment 05/11/23  -DB         Time Calculation- PT    Total Timed Code Minutes- PT 11 minute(s)  -DB            User Key  (r) = Recorded By, (t) = Taken By, (c) = Cosigned By    Initials Name Provider Type    DB Kathryn Zimmerman, PT Physical Therapist              Therapy Charges for Today     Code Description Service Date Service Provider Modifiers Qty    [id number] HC PT THER PROC EA 15 MIN 5/10/2023 Kathryn Zimmerman PT GP 1          PT G-Codes  Outcome Measure Options: AM-PAC 6 Clicks Basic Mobility (PT)  AM-PAC 6 Clicks Score (PT): 18       Kathryn Zimmerman PT  5/10/2023

## 2023-05-10 NOTE — CASE MANAGEMENT/SOCIAL WORK
Continued Stay Note  [facility]     Patient Name: Catherine Boyer  MRN: [id number]  Today's Date: 5/10/2023    Admit Date: 5/3/2023    Plan: [facility] pending pre cert.  Family will pay for WC Van transport to SNF.   Discharge Plan     Row Name 05/10/23 1701       Plan    Plan [facility] pending pre cert.  Family will pay for WC Van transport to SNF.    Patient/Family in Agreement with Plan yes    Plan Comments Called and spoke to Mitra/Donald and pre cert remains pending. Called pt's daughter Ila and updated on D/C plan. Met with pt at bedside and discussed D/C plan. All in agreement with plan. Cassius Bates RN-BC               Discharge Codes    No documentation.               Expected Discharge Date and Time     Expected Discharge Date Expected Discharge Time    May 10, 2023             Cassius Bates RN

## 2023-05-10 NOTE — PROGRESS NOTES
Name: Catherine Boyer ADMIT: 5/3/2023   : 1940  PCP: Kristi Moreau APRN    MRN: [id number] LOS: 6 days   AGE/SEX: 83 y.o. female  ROOM: [facility]     Subjective   Subjective   No acute events. Patient denies new complaints. No more abdominal pain. She had about 4 stools yesterday, none today. Taking PO.  No CP/dyspnea/f/c.  No family at bedside.  Objective   Objective   Vital Signs  Temp:  [97.9 °F (36.6 °C)-98.8 °F (37.1 °C)] 97.9 °F (36.6 °C)  Heart Rate:  [80-86] 83  Resp:  [18] 18  BP: (115-145)/(64-81) 145/81  SpO2:  [92 %-95 %] 95 %  on  Flow (L/min):  [0.5-2] 2;   Device (Oxygen Therapy): nasal cannula  Body mass index is 26.14 kg/m².  Physical Exam  Vitals and nursing note reviewed.   Constitutional:       General: She is not in acute distress.     Appearance: She is not toxic-appearing or diaphoretic.   HENT:      Head: Normocephalic and atraumatic.      Nose: Nose normal.      Mouth/Throat:      Mouth: Mucous membranes are moist.      Pharynx: Oropharynx is clear.   Eyes:      Conjunctiva/sclera: Conjunctivae normal.      Pupils: Pupils are equal, round, and reactive to light.   Cardiovascular:      Rate and Rhythm: Normal rate and regular rhythm.      Pulses: Normal pulses.   Pulmonary:      Effort: Pulmonary effort is normal.      Breath sounds: Normal breath sounds.   Abdominal:      General: Bowel sounds are normal.      Palpations: Abdomen is soft.      Tenderness: There is no abdominal tenderness.   Musculoskeletal:         General: No swelling or tenderness.      Cervical back: Normal range of motion and neck supple.   Skin:     General: Skin is warm and dry.      Capillary Refill: Capillary refill takes less than 2 seconds.   Neurological:      General: No focal deficit present.      Mental Status: She is alert.   Psychiatric:         Mood and Affect: Mood normal.         Behavior: Behavior normal.       Results Review     I reviewed the patient's new clinical results.  Results from last 7  days   Lab Units 05/10/23  0603 05/09/23  0345 05/08/23  0245 05/07/23  0526   WBC 10*3/mm3 37.28* 35.18* 28.80* 17.86*   HEMOGLOBIN g/dL 8.8* 8.6* 8.0* 7.6*   PLATELETS 10*3/mm3 181 155 108* 129*     Results from last 7 days   Lab Units 05/10/23  0603 05/09/23  0345 05/08/23  0245 05/07/23  0526   SODIUM mmol/L 140 139 137 140   POTASSIUM mmol/L 4.0 3.7 3.4* 3.8   CHLORIDE mmol/L 100 100 105 111*   CO2 mmol/L 31.0* 28.0 25.7 22.3   BUN mg/dL 10 11 11 12   CREATININE mg/dL 0.51* 0.66 0.62 0.52*   GLUCOSE mg/dL 84 86 125* 97   EGFR mL/min/1.73 91.6 86.1 87.4 91.2     Results from last 7 days   Lab Units 05/03/23  1534   ALBUMIN g/dL 3.6   BILIRUBIN mg/dL 0.5   ALK PHOS U/L 282*   AST (SGOT) U/L 19   ALT (SGPT) U/L 25     Results from last 7 days   Lab Units 05/10/23  0603 05/09/23  0345 05/08/23  0245 05/07/23  0526 05/06/23  0419 05/05/23  0902 05/05/23  0524 05/04/23  0350 05/03/23  1534   CALCIUM mg/dL 9.0 8.3* 8.4* 8.2*   < >  --    < > 8.0* 8.3*   ALBUMIN g/dL  --   --   --   --   --   --   --   --  3.6   MAGNESIUM mg/dL  --   --   --   --   --  1.7  --  1.7  --     < > = values in this interval not displayed.     Results from last 7 days   Lab Units 05/03/23  1534   LACTATE mmol/L 1.8     No results found for: HGBA1C, POCGLU    No radiology results for the last day  I have personally reviewed all medications:  Scheduled Medications  apixaban, 5 mg, Oral, BID  atorvastatin, 80 mg, Oral, Nightly  cholestyramine, 1 packet, Oral, Q12H  clopidogrel, 75 mg, Oral, Daily  famotidine, 20 mg, Oral, Daily  furosemide, 40 mg, Oral, Daily  hydrocortisone-bacitracin-zinc oxide-nystatin, 1 application, Topical, BID  latanoprost, 1 drop, Both Eyes, Nightly  metoprolol succinate XL, 25 mg, Oral, BID  Petrolatum, 1 application, Topical, Q24H  ruxolitinib, 15 mg, Oral, BID  saccharomyces boulardii, 250 mg, Oral, BID  sertraline, 100 mg, Oral, Daily  sodium bicarbonate, 650 mg, Oral, TID    Infusions   Diet  Diet: Regular/House  Diet, Gastrointestinal Diets; Low Irritant; Texture: Regular Texture (IDDSI 7); Fluid Consistency: Thin (IDDSI 0)    I have personally reviewed:  [x]  Laboratory   []  Microbiology   []  Radiology   [x]  EKG/Telemetry  []  Cardiology/Vascular   []  Pathology    []  Records    Assessment/Plan     Active Hospital Problems    Diagnosis  POA   • **Rotavirus enteritis [A08.0]  Unknown   • Moderate Malnutrition (HCC) [E44.0]  Yes   • 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 [Z22.1]  Not Applicable   • KARLI (acute kidney injury) [N17.9]  Unknown   • Acute renal failure, unspecified acute renal failure type [N17.9]  Yes   • Epigastric abdominal pain [R10.13]  Yes   • Acute on chronic diastolic heart failure [I50.33]  No   • Hypertension [I10]  Yes   • CAD (coronary artery disease) [I25.10]  Yes   • Myeloproliferative disorder [D47.1]  Yes   • Paroxysmal atrial fibrillation [I48.0]  Yes   • Type 2 diabetes mellitus with circulatory disorder, 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   Rotavirus Enteritis  - improving overall but she does appear to have some diarrhea with diet advancement, likely postinfectious  - continue low irritant diet, cholestyramine, and PRN imodium  - Cdiff PCR is positive with a negative antigen, indicating colonization rather than infection-no specific treatment for this is indicated    KARLI  - secondary to above, resolved with IVF    Chest Pain/Dyspnea/Acute on Chronic Diastolic CHF/CAD  - CP and dyspnea resolved; troponin elevation flat and non-specific  - volume status improved with IV lasix, resumed PO dosing  - appreciate cardiology recs    PAF  - rate controlled, continue metoprolol  - continue AC with eliquis    Type 2 DM  - BG acceptable, no need for insulin coverage at this time    Myeloproliferative Disorder  - has chronically elevated WBC and anemia and these values have been consistent with her baseline  - on Jakafi  -  follows with Dr. Reinoso of [facility]    Abnormal UA  - looks contaminated  - no UTI symptoms and clinically improving without antibiotics  - no specific treatment for this is indicated    Eliquis (home med) for DVT prophylaxis.  DNR.  Discussed with patient, nursing staff, primary care provider and care team on multidisciplinary rounds.  Anticipate discharge to SNU facility once precert has been obtained.    Patient's PCP reached out to me regarding the fact that she has had chronic diarrhea, weight loss, and elevated alkaline phosphatase. A liver biopsy was being considered and I will ask for GI input at her request.    Kenton Meeks MD  [facility]  05/10/23  11:15 EDT    Addendum: I spoke with Dr. Diaz and he recommended follow up once her acute issues have had time to resolve. I will cancel the formal consult.

## 2023-05-10 NOTE — PLAN OF CARE
Goal Outcome Evaluation:  Plan of Care Reviewed With: patient        Progress: improving  Outcome Evaluation: Adjusted NC to 2L during the night. Contact spore isolation maintained. Skin care done after incontinent, magic cream applied. VSS, JOSE.

## 2023-05-10 NOTE — PLAN OF CARE
Goal Outcome Evaluation:  Plan of Care Reviewed With: patient        Progress: improving  Outcome Evaluation: Pt supine in bed at start of session. She declines ambulating today 2/2 fatigue but agreeable to perform LE ther ex EOB. Pt performs with SV for sitting balance, able to stand to get readjusted in bed with CGA. Pt continues to benefit from skilled PT intervention.

## 2023-05-10 NOTE — PLAN OF CARE
Goal Outcome Evaluation:  Plan of Care Reviewed With: patient        Problem: Adult Inpatient Plan of Care  Goal: Plan of Care Review  Outcome: Ongoing, Progressing  Flowsheets (Taken 5/10/2023 6321)  Progress: improving  Plan of Care Reviewed With: patient  Outcome Evaluation: Vitals stable. Pt maintaining O2 sat on 1-2L NC. Contact spore isolation maintained. Possible dc in 1-2 days awaiting precert at SNF. Pt needs met and questions answered. Will continue to monitor.

## 2023-05-11 LAB
ANION GAP SERPL CALCULATED.3IONS-SCNC: 11 MMOL/L (ref 5–15)
ANISOCYTOSIS BLD QL: ABNORMAL
BASOPHILS # BLD MANUAL: 0.68 10*3/MM3 (ref 0–0.2)
BASOPHILS NFR BLD MANUAL: 2.1 % (ref 0–1.5)
BUN SERPL-MCNC: 13 MG/DL (ref 8–23)
BUN/CREAT SERPL: 22.4 (ref 7–25)
CALCIUM SPEC-SCNC: 8.3 MG/DL (ref 8.6–10.5)
CHLORIDE SERPL-SCNC: 100 MMOL/L (ref 98–107)
CO2 SERPL-SCNC: 31 MMOL/L (ref 22–29)
CREAT SERPL-MCNC: 0.58 MG/DL (ref 0.57–1)
DEPRECATED RDW RBC AUTO: 56 FL (ref 37–54)
EGFRCR SERPLBLD CKD-EPI 2021: 88.8 ML/MIN/1.73
ERYTHROCYTE [DISTWIDTH] IN BLOOD BY AUTOMATED COUNT: 16.6 % (ref 12.3–15.4)
GLUCOSE SERPL-MCNC: 89 MG/DL (ref 65–99)
HCT VFR BLD AUTO: 25.3 % (ref 34–46.6)
HGB BLD-MCNC: 8.1 G/DL (ref 12–15.9)
HYPOCHROMIA BLD QL: ABNORMAL
LYMPHOCYTES # BLD MANUAL: 2.66 10*3/MM3 (ref 0.7–3.1)
LYMPHOCYTES NFR BLD MANUAL: 10.3 % (ref 5–12)
MCH RBC QN AUTO: 30 PG (ref 26.6–33)
MCHC RBC AUTO-ENTMCNC: 32 G/DL (ref 31.5–35.7)
MCV RBC AUTO: 93.7 FL (ref 79–97)
MICROCYTES BLD QL: ABNORMAL
MONOCYTES # BLD: 3.34 10*3/MM3 (ref 0.1–0.9)
MYELOCYTES NFR BLD MANUAL: 3.1 % (ref 0–0)
NEUTROPHILS # BLD AUTO: 24.73 10*3/MM3 (ref 1.7–7)
NEUTROPHILS NFR BLD MANUAL: 76.3 % (ref 42.7–76)
PLAT MORPH BLD: NORMAL
PLATELET # BLD AUTO: 192 10*3/MM3 (ref 140–450)
PMV BLD AUTO: 12.5 FL (ref 6–12)
POTASSIUM SERPL-SCNC: 3.6 MMOL/L (ref 3.5–5.2)
RBC # BLD AUTO: 2.7 10*6/MM3 (ref 3.77–5.28)
SODIUM SERPL-SCNC: 142 MMOL/L (ref 136–145)
VARIANT LYMPHS NFR BLD MANUAL: 8.2 % (ref 19.6–45.3)
WBC MORPH BLD: NORMAL
WBC NRBC COR # BLD: 32.41 10*3/MM3 (ref 3.4–10.8)

## 2023-05-11 PROCEDURE — 85025 COMPLETE CBC W/AUTO DIFF WBC: CPT | Performed by: INTERNAL MEDICINE

## 2023-05-11 PROCEDURE — C9399 UNCLASSIFIED DRUGS OR BIOLOG: HCPCS | Performed by: INTERNAL MEDICINE

## 2023-05-11 PROCEDURE — 80048 BASIC METABOLIC PNL TOTAL CA: CPT | Performed by: INTERNAL MEDICINE

## 2023-05-11 PROCEDURE — 63710000001 RUXOLITINIB 15 MG TABLET: Performed by: INTERNAL MEDICINE

## 2023-05-11 PROCEDURE — 85007 BL SMEAR W/DIFF WBC COUNT: CPT | Performed by: INTERNAL MEDICINE

## 2023-05-11 RX ADMIN — FAMOTIDINE 20 MG: 20 TABLET, FILM COATED ORAL at 08:44

## 2023-05-11 RX ADMIN — CHOLESTYRAMINE 1 PACKET: 4 POWDER, FOR SUSPENSION ORAL at 20:25

## 2023-05-11 RX ADMIN — METOPROLOL SUCCINATE 25 MG: 25 TABLET, EXTENDED RELEASE ORAL at 20:25

## 2023-05-11 RX ADMIN — Medication 250 MG: at 20:25

## 2023-05-11 RX ADMIN — SODIUM BICARBONATE 650 MG: 650 TABLET ORAL at 08:38

## 2023-05-11 RX ADMIN — SODIUM BICARBONATE 650 MG: 650 TABLET ORAL at 16:26

## 2023-05-11 RX ADMIN — RUXOLITINIB 15 MG: 15 TABLET ORAL at 20:30

## 2023-05-11 RX ADMIN — SODIUM BICARBONATE 650 MG: 650 TABLET ORAL at 20:25

## 2023-05-11 RX ADMIN — CHOLESTYRAMINE 1 PACKET: 4 POWDER, FOR SUSPENSION ORAL at 08:38

## 2023-05-11 RX ADMIN — ZINC OXIDE 1 APPLICATION: 200 OINTMENT TOPICAL at 08:45

## 2023-05-11 RX ADMIN — METOPROLOL SUCCINATE 25 MG: 25 TABLET, EXTENDED RELEASE ORAL at 08:38

## 2023-05-11 RX ADMIN — SERTRALINE 100 MG: 100 TABLET, FILM COATED ORAL at 08:38

## 2023-05-11 RX ADMIN — APIXABAN 5 MG: 5 TABLET, FILM COATED ORAL at 08:38

## 2023-05-11 RX ADMIN — RUXOLITINIB 15 MG: 15 TABLET ORAL at 08:39

## 2023-05-11 RX ADMIN — PETROLATUM 1 APPLICATION: 420 OINTMENT TOPICAL at 08:45

## 2023-05-11 RX ADMIN — CLOPIDOGREL BISULFATE 75 MG: 75 TABLET, FILM COATED ORAL at 08:38

## 2023-05-11 RX ADMIN — ATORVASTATIN CALCIUM 80 MG: 80 TABLET, FILM COATED ORAL at 20:25

## 2023-05-11 RX ADMIN — ZINC OXIDE 1 APPLICATION: 200 OINTMENT TOPICAL at 20:25

## 2023-05-11 RX ADMIN — LATANOPROST 1 DROP: 50 SOLUTION OPHTHALMIC at 20:25

## 2023-05-11 RX ADMIN — APIXABAN 5 MG: 5 TABLET, FILM COATED ORAL at 20:25

## 2023-05-11 RX ADMIN — FUROSEMIDE 40 MG: 40 TABLET ORAL at 08:38

## 2023-05-11 RX ADMIN — Medication 250 MG: at 08:38

## 2023-05-11 NOTE — PROGRESS NOTES
Name: Catherine Boyer ADMIT: 5/3/2023   : 1940  PCP: Kristi Moreau APRN    MRN: [id number] LOS: 7 days   AGE/SEX: 83 y.o. female  ROOM: [facility]     Subjective   Subjective   No acute events. Patient denies new complaints. No more abdominal pain. Stools are improving, more formed today. Taking PO.  No CP/dyspnea/f/c.  No family at bedside.  Objective   Objective   Vital Signs  Temp:  [97.5 °F (36.4 °C)-98.2 °F (36.8 °C)] 98.2 °F (36.8 °C)  Heart Rate:  [70-94] 79  Resp:  [18] 18  BP: (105-135)/(61-94) 134/77  SpO2:  [91 %] 91 %  on  Flow (L/min):  [1] 1;   Device (Oxygen Therapy): nasal cannula  Body mass index is 25.55 kg/m².  Physical Exam  Vitals and nursing note reviewed.   Constitutional:       General: She is not in acute distress.     Appearance: She is not toxic-appearing or diaphoretic.   HENT:      Head: Normocephalic and atraumatic.      Nose: Nose normal.      Mouth/Throat:      Mouth: Mucous membranes are moist.      Pharynx: Oropharynx is clear.   Eyes:      Conjunctiva/sclera: Conjunctivae normal.      Pupils: Pupils are equal, round, and reactive to light.   Cardiovascular:      Rate and Rhythm: Normal rate and regular rhythm.      Pulses: Normal pulses.   Pulmonary:      Effort: Pulmonary effort is normal.      Breath sounds: Normal breath sounds.   Abdominal:      General: Bowel sounds are normal.      Palpations: Abdomen is soft.      Tenderness: There is no abdominal tenderness.   Musculoskeletal:         General: No swelling or tenderness.      Cervical back: Normal range of motion and neck supple.   Skin:     General: Skin is warm and dry.      Capillary Refill: Capillary refill takes less than 2 seconds.   Neurological:      General: No focal deficit present.      Mental Status: She is alert.   Psychiatric:         Mood and Affect: Mood normal.         Behavior: Behavior normal.       Results Review     I reviewed the patient's new clinical results.  Results from last 7 days   Lab  Units 05/11/23  0330 05/10/23  0603 05/09/23  0345 05/08/23  0245   WBC 10*3/mm3 32.41* 37.28* 35.18* 28.80*   HEMOGLOBIN g/dL 8.1* 8.8* 8.6* 8.0*   PLATELETS 10*3/mm3 192 181 155 108*     Results from last 7 days   Lab Units 05/11/23  0330 05/10/23  0603 05/09/23 0345 05/08/23  0245   SODIUM mmol/L 142 140 139 137   POTASSIUM mmol/L 3.6 4.0 3.7 3.4*   CHLORIDE mmol/L 100 100 100 105   CO2 mmol/L 31.0* 31.0* 28.0 25.7   BUN mg/dL 13 10 11 11   CREATININE mg/dL 0.58 0.51* 0.66 0.62   GLUCOSE mg/dL 89 84 86 125*   EGFR mL/min/1.73 88.8 91.6 86.1 87.4         Results from last 7 days   Lab Units 05/11/23  0330 05/10/23  0603 05/09/23 0345 05/08/23  0245 05/06/23  0419 05/05/23  0902   CALCIUM mg/dL 8.3* 9.0 8.3* 8.4*   < >  --    MAGNESIUM mg/dL  --   --   --   --   --  1.7    < > = values in this interval not displayed.         No results found for: HGBA1C, POCGLU    No radiology results for the last day  I have personally reviewed all medications:  Scheduled Medications  apixaban, 5 mg, Oral, BID  atorvastatin, 80 mg, Oral, Nightly  cholestyramine, 1 packet, Oral, Q12H  clopidogrel, 75 mg, Oral, Daily  famotidine, 20 mg, Oral, Daily  furosemide, 40 mg, Oral, Daily  hydrocortisone-bacitracin-zinc oxide-nystatin, 1 application, Topical, BID  latanoprost, 1 drop, Both Eyes, Nightly  metoprolol succinate XL, 25 mg, Oral, BID  Petrolatum, 1 application, Topical, Q24H  ruxolitinib, 15 mg, Oral, BID  saccharomyces boulardii, 250 mg, Oral, BID  sertraline, 100 mg, Oral, Daily  sodium bicarbonate, 650 mg, Oral, TID    Infusions   Diet  Diet: Regular/House Diet, Gastrointestinal Diets; Low Irritant; Texture: Regular Texture (IDDSI 7); Fluid Consistency: Thin (IDDSI 0)    I have personally reviewed:  [x]  Laboratory   []  Microbiology   []  Radiology   [x]  EKG/Telemetry  []  Cardiology/Vascular   []  Pathology    []  Records    Assessment/Plan     Active Hospital Problems    Diagnosis  POA   • **Rotavirus enteritis [A08.0]   Unknown   • Moderate Malnutrition (HCC) [E44.0]  Yes   • 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 [Z22.1]  Not Applicable   • KARLI (acute kidney injury) [N17.9]  Unknown   • Acute renal failure, unspecified acute renal failure type [N17.9]  Yes   • Epigastric abdominal pain [R10.13]  Yes   • Acute on chronic diastolic heart failure [I50.33]  No   • Hypertension [I10]  Yes   • CAD (coronary artery disease) [I25.10]  Yes   • Myeloproliferative disorder [D47.1]  Yes   • Paroxysmal atrial fibrillation [I48.0]  Yes   • Type 2 diabetes mellitus with circulatory disorder, 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   Rotavirus Enteritis  - improving overall but had chronic diarrhea prior to her acute illness-this is also improving with her new regimen  - continue low irritant diet, cholestyramine, and PRN imodium  - Cdiff PCR is positive with a negative antigen, indicating colonization rather than infection-no specific treatment for this is indicated    KARLI  - secondary to above, resolved with IVF    Chest Pain/Dyspnea/Acute on Chronic Diastolic CHF/CAD  - CP and dyspnea resolved; troponin elevation flat and non-specific  - volume status improved with IV lasix, resumed PO dosing  - appreciate cardiology recs    PAF  - rate controlled, continue metoprolol  - continue AC with eliquis    Type 2 DM  - BG acceptable, no need for insulin coverage at this time    Myeloproliferative Disorder  - has chronically elevated WBC and anemia and these values have been consistent with her baseline  - on Jakafi  - follows with Dr. Reinoso of [facility]    Abnormal UA  - looks contaminated  - no UTI symptoms and clinically improving without antibiotics  - no specific treatment for this is indicated    Eliquis (home med) for DVT prophylaxis.  DNR.  Discussed with patient, nursing staff, primary care provider and care team on multidisciplinary rounds.  Anticipate discharge to SNU facility  once precert has been obtained.    Kenton Meeks MD  [facility]  05/11/23  16:18 EDT

## 2023-05-11 NOTE — PLAN OF CARE
Goal Outcome Evaluation:      VSS. Pt had no complaints of abdominal pain this shift. Pt reports her stools are not as loose as they have been. Waiting on precert for SNF. D/C when precert ready.

## 2023-05-11 NOTE — CASE MANAGEMENT/SOCIAL WORK
"Continued Stay Note  [facility]     Patient Name: Catherine Boyer  MRN: [id number]  Today's Date: 5/11/2023    Admit Date: 5/3/2023    Plan: [place] [facility] pending pre cert. [employer] [facility] offering P2P. Attempted today by Dr Bravo but [employer] System was down. Will need to resubmit in AM to PA rev. [employer] MCR P2P [phone] option 4.   Discharge Plan     Row Name 05/11/23 1624       Plan    Plan [place] [facility] pending pre cert. [employer] M[employer] offering P2P. Attempted today by Dr Bravo but [employer] System was down. Will need to resubmit in AM to PA rev. [employer] [facility] P2P [phone] option 4.    Patient/Family in Agreement with Plan yes    Plan Comments Received call from [facility] and was told that [facility] was offering P2P for SNF and would need to call [phone] option 4 today before 1630. Submitted for PA review. Per Dr Bravo, \"tried to call this p2p but their system \"is down\" until tomorrow.  They said I can try back in the morning.  I asked if the p2p cut-off time would be waived and she said \"yes\".  Tomorrow morning please RE-SUBMIT the post-acute p2p so tomorrow's [employer] [facility]er can call and see if they will be able to perform.\"  Will resubmit to PA review for P2P SNF in AM. Notified pt's daughter Ila of insurance requiring P2P.  Cassius Bates RN-BC               Discharge Codes    No documentation.               Expected Discharge Date and Time     Expected Discharge Date Expected Discharge Time    May 11, 2023             Cassius Bates RN    "

## 2023-05-11 NOTE — PLAN OF CARE
Goal Outcome Evaluation:  Plan of Care Reviewed With: patient              No acute distress noted this shift, safety maintained, continue plan of care

## 2023-05-12 LAB
ANION GAP SERPL CALCULATED.3IONS-SCNC: 10.8 MMOL/L (ref 5–15)
ANISOCYTOSIS BLD QL: ABNORMAL
BASOPHILS # BLD MANUAL: 0.43 10*3/MM3 (ref 0–0.2)
BASOPHILS NFR BLD MANUAL: 1 % (ref 0–1.5)
BUN SERPL-MCNC: 14 MG/DL (ref 8–23)
BUN/CREAT SERPL: 21.9 (ref 7–25)
CALCIUM SPEC-SCNC: 8.6 MG/DL (ref 8.6–10.5)
CHLORIDE SERPL-SCNC: 97 MMOL/L (ref 98–107)
CO2 SERPL-SCNC: 31.2 MMOL/L (ref 22–29)
CREAT SERPL-MCNC: 0.64 MG/DL (ref 0.57–1)
DEPRECATED RDW RBC AUTO: 54.3 FL (ref 37–54)
EGFRCR SERPLBLD CKD-EPI 2021: 86.7 ML/MIN/1.73
EOSINOPHIL # BLD MANUAL: 0.43 10*3/MM3 (ref 0–0.4)
EOSINOPHIL NFR BLD MANUAL: 1 % (ref 0.3–6.2)
ERYTHROCYTE [DISTWIDTH] IN BLOOD BY AUTOMATED COUNT: 16.3 % (ref 12.3–15.4)
GLUCOSE SERPL-MCNC: 100 MG/DL (ref 65–99)
HCT VFR BLD AUTO: 25.5 % (ref 34–46.6)
HGB BLD-MCNC: 8.5 G/DL (ref 12–15.9)
HYPOCHROMIA BLD QL: ABNORMAL
LYMPHOCYTES # BLD MANUAL: 3.43 10*3/MM3 (ref 0.7–3.1)
LYMPHOCYTES NFR BLD MANUAL: 2 % (ref 5–12)
MACROCYTES BLD QL SMEAR: ABNORMAL
MCH RBC QN AUTO: 30.7 PG (ref 26.6–33)
MCHC RBC AUTO-ENTMCNC: 33.3 G/DL (ref 31.5–35.7)
MCV RBC AUTO: 92.1 FL (ref 79–97)
METAMYELOCYTES NFR BLD MANUAL: 2 % (ref 0–0)
MICROCYTES BLD QL: ABNORMAL
MONOCYTES # BLD: 0.86 10*3/MM3 (ref 0.1–0.9)
NEUTROPHILS # BLD AUTO: 36.83 10*3/MM3 (ref 1.7–7)
NEUTROPHILS NFR BLD MANUAL: 86 % (ref 42.7–76)
PLAT MORPH BLD: NORMAL
PLATELET # BLD AUTO: 234 10*3/MM3 (ref 140–450)
PMV BLD AUTO: 12 FL (ref 6–12)
POIKILOCYTOSIS BLD QL SMEAR: ABNORMAL
POLYCHROMASIA BLD QL SMEAR: ABNORMAL
POTASSIUM SERPL-SCNC: 3.5 MMOL/L (ref 3.5–5.2)
RBC # BLD AUTO: 2.77 10*6/MM3 (ref 3.77–5.28)
SODIUM SERPL-SCNC: 139 MMOL/L (ref 136–145)
STOMATOCYTES BLD QL SMEAR: ABNORMAL
VARIANT LYMPHS NFR BLD MANUAL: 8 % (ref 19.6–45.3)
WBC MORPH BLD: NORMAL
WBC NRBC COR # BLD: 42.83 10*3/MM3 (ref 3.4–10.8)

## 2023-05-12 PROCEDURE — 63710000001 RUXOLITINIB 15 MG TABLET: Performed by: INTERNAL MEDICINE

## 2023-05-12 PROCEDURE — 97110 THERAPEUTIC EXERCISES: CPT

## 2023-05-12 PROCEDURE — 80048 BASIC METABOLIC PNL TOTAL CA: CPT | Performed by: INTERNAL MEDICINE

## 2023-05-12 PROCEDURE — 97116 GAIT TRAINING THERAPY: CPT

## 2023-05-12 PROCEDURE — 85025 COMPLETE CBC W/AUTO DIFF WBC: CPT | Performed by: INTERNAL MEDICINE

## 2023-05-12 PROCEDURE — C9399 UNCLASSIFIED DRUGS OR BIOLOG: HCPCS | Performed by: INTERNAL MEDICINE

## 2023-05-12 PROCEDURE — 85007 BL SMEAR W/DIFF WBC COUNT: CPT | Performed by: INTERNAL MEDICINE

## 2023-05-12 RX ORDER — CHOLESTYRAMINE 4 G/9G
1 POWDER, FOR SUSPENSION ORAL EVERY 12 HOURS SCHEDULED
Qty: 60 EACH | Refills: 0 | Status: SHIPPED | OUTPATIENT
Start: 2023-05-12 | End: 2023-05-16 | Stop reason: HOSPADM

## 2023-05-12 RX ORDER — LOPERAMIDE HYDROCHLORIDE 2 MG/1
2 CAPSULE ORAL 4 TIMES DAILY PRN
Qty: 120 CAPSULE | Refills: 0 | Status: SHIPPED | OUTPATIENT
Start: 2023-05-12

## 2023-05-12 RX ORDER — SODIUM BICARBONATE 650 MG/1
650 TABLET ORAL 3 TIMES DAILY
Qty: 90 TABLET | Refills: 0 | Status: SHIPPED | OUTPATIENT
Start: 2023-05-12 | End: 2023-08-02

## 2023-05-12 RX ORDER — SACCHAROMYCES BOULARDII 250 MG
250 CAPSULE ORAL 2 TIMES DAILY
Qty: 60 CAPSULE | Refills: 0 | Status: SHIPPED | OUTPATIENT
Start: 2023-05-12

## 2023-05-12 RX ADMIN — APIXABAN 5 MG: 5 TABLET, FILM COATED ORAL at 20:46

## 2023-05-12 RX ADMIN — METOPROLOL SUCCINATE 25 MG: 25 TABLET, EXTENDED RELEASE ORAL at 20:46

## 2023-05-12 RX ADMIN — SODIUM BICARBONATE 650 MG: 650 TABLET ORAL at 09:29

## 2023-05-12 RX ADMIN — RUXOLITINIB 15 MG: 15 TABLET ORAL at 09:30

## 2023-05-12 RX ADMIN — SODIUM BICARBONATE 650 MG: 650 TABLET ORAL at 20:46

## 2023-05-12 RX ADMIN — APIXABAN 5 MG: 5 TABLET, FILM COATED ORAL at 09:29

## 2023-05-12 RX ADMIN — LATANOPROST 1 DROP: 50 SOLUTION OPHTHALMIC at 20:50

## 2023-05-12 RX ADMIN — Medication 3 MG: at 01:43

## 2023-05-12 RX ADMIN — CLOPIDOGREL BISULFATE 75 MG: 75 TABLET, FILM COATED ORAL at 09:29

## 2023-05-12 RX ADMIN — ZINC OXIDE 1 APPLICATION: 200 OINTMENT TOPICAL at 20:47

## 2023-05-12 RX ADMIN — SODIUM BICARBONATE 650 MG: 650 TABLET ORAL at 16:47

## 2023-05-12 RX ADMIN — CHOLESTYRAMINE 1 PACKET: 4 POWDER, FOR SUSPENSION ORAL at 11:55

## 2023-05-12 RX ADMIN — Medication 250 MG: at 20:46

## 2023-05-12 RX ADMIN — SERTRALINE 100 MG: 100 TABLET, FILM COATED ORAL at 09:29

## 2023-05-12 RX ADMIN — ZINC OXIDE 1 APPLICATION: 200 OINTMENT TOPICAL at 09:28

## 2023-05-12 RX ADMIN — METOPROLOL SUCCINATE 25 MG: 25 TABLET, EXTENDED RELEASE ORAL at 09:29

## 2023-05-12 RX ADMIN — RUXOLITINIB 15 MG: 15 TABLET ORAL at 20:47

## 2023-05-12 RX ADMIN — PETROLATUM 1 APPLICATION: 420 OINTMENT TOPICAL at 09:30

## 2023-05-12 RX ADMIN — Medication 10 ML: at 09:29

## 2023-05-12 RX ADMIN — Medication 250 MG: at 09:29

## 2023-05-12 RX ADMIN — FAMOTIDINE 20 MG: 20 TABLET, FILM COATED ORAL at 09:29

## 2023-05-12 RX ADMIN — FUROSEMIDE 40 MG: 40 TABLET ORAL at 09:29

## 2023-05-12 RX ADMIN — ATORVASTATIN CALCIUM 80 MG: 80 TABLET, FILM COATED ORAL at 20:46

## 2023-05-12 NOTE — PLAN OF CARE
Goal Outcome Evaluation:  Plan of Care Reviewed With: patient        Progress: improving  Outcome Evaluation: Pt tolerated treatment well this date. Increased gait distance to 80ft w/ Rw and CGA, though required min A to correct 1 LOB. Limited overall d/t fatigue, no pain reported. Pt also completed a few seated and supine LE exercises, and encouraged pt to ambulate w/ nsg later.

## 2023-05-12 NOTE — CASE MANAGEMENT/SOCIAL WORK
Continued Stay Note  [facility]     Patient Name: Catherine Boyer  MRN: [id number]  Today's Date: 5/12/2023    Admit Date: 5/3/2023    Plan: [place] [facility]. Family filing Expedited Appeal with [facility].  Decision of Appeal pending.   Discharge Plan     Row Name 05/12/23 1707       Plan    Plan [place] [facility]. [facility].  Decision of Appeal pending.    Patient/Family in Agreement with Plan yes    Plan Comments Request for P2P resubmitted today at 0830 for PA review. At 1322 Dr Magana PA Review completed with no P2P done and rec AL with HH. Received call from Karan Merlin, pt's son and POA/[facility], stating that he had called [employer] regarding denial and P2P was never done. Called and discussed with Emma PIERRE and pt or POA has the right to file an expedited appeal for SNF. Emma obtained Expedited phone and Fax # (P: [phone]  F:[phone]) Case Referrance# [id number] [id number]. Called and spoke to Karan Boyer and notified of findings. Karan requested information be emailed to him at [email or web address]. Information emailed to requested email address. Notified Dr Meeks of situation. Cassius Bates RN-BC               Discharge Codes    No documentation.               Expected Discharge Date and Time     Expected Discharge Date Expected Discharge Time    May 12, 2023             Cassius Bates RN

## 2023-05-12 NOTE — PROGRESS NOTES
Name: Catherine Boyer ADMIT: 5/3/2023   : 1940  PCP: Kristi Moreau APRN    MRN: [id number] LOS: 8 days   AGE/SEX: 83 y.o. female  ROOM: [facility] [place]     Subjective   Subjective   No acute events. Patient denies new complaints. Taking PO.  No CP/dyspnea/f/c.  No family at bedside.  Objective   Objective   Vital Signs  Temp:  [98.3 °F (36.8 °C)-98.8 °F (37.1 °C)] 98.3 °F (36.8 °C)  Heart Rate:  [79-91] 82  Resp:  [18] 18  BP: (121-134)/(50-72) 134/66  SpO2:  [91 %-99 %] 91 %  on  Flow (L/min):  [1-2] 2;   Device (Oxygen Therapy): nasal cannula  Body mass index is 25.55 kg/m².  Physical Exam  Vitals and nursing note reviewed.   Constitutional:       General: She is not in acute distress.     Appearance: She is not toxic-appearing or diaphoretic.   HENT:      Head: Normocephalic and atraumatic.      Nose: Nose normal.      Mouth/Throat:      Mouth: Mucous membranes are moist.      Pharynx: Oropharynx is clear.   Eyes:      Conjunctiva/sclera: Conjunctivae normal.      Pupils: Pupils are equal, round, and reactive to light.   Cardiovascular:      Rate and Rhythm: Normal rate and regular rhythm.      Pulses: Normal pulses.   Pulmonary:      Effort: Pulmonary effort is normal.      Breath sounds: Normal breath sounds.   Abdominal:      General: Bowel sounds are normal.      Palpations: Abdomen is soft.      Tenderness: There is no abdominal tenderness.   Musculoskeletal:         General: No swelling or tenderness.      Cervical back: Normal range of motion and neck supple.   Skin:     General: Skin is warm and dry.      Capillary Refill: Capillary refill takes less than 2 seconds.   Neurological:      General: No focal deficit present.      Mental Status: She is alert.   Psychiatric:         Mood and Affect: Mood normal.         Behavior: Behavior normal.       Results Review     I reviewed the patient's new clinical results.  Results from last 7 days   Lab Units 23  0332 23  0330 05/10/23  0603  05/09/23  0345   WBC 10*3/mm3 42.83* 32.41* 37.28* 35.18*   HEMOGLOBIN g/dL 8.5* 8.1* 8.8* 8.6*   PLATELETS 10*3/mm3 234 192 181 155     Results from last 7 days   Lab Units 05/12/23  0332 05/11/23  0330 05/10/23  0603 05/09/23  0345   SODIUM mmol/L 139 142 140 139   POTASSIUM mmol/L 3.5 3.6 4.0 3.7   CHLORIDE mmol/L 97* 100 100 100   CO2 mmol/L 31.2* 31.0* 31.0* 28.0   BUN mg/dL 14 13 10 11   CREATININE mg/dL 0.64 0.58 0.51* 0.66   GLUCOSE mg/dL 100* 89 84 86   EGFR mL/min/1.73 86.7 88.8 91.6 86.1         Results from last 7 days   Lab Units 05/12/23  0332 05/11/23  0330 05/10/23  0603 05/09/23  0345   CALCIUM mg/dL 8.6 8.3* 9.0 8.3*         No results found for: HGBA1C, POCGLU    No radiology results for the last day  I have personally reviewed all medications:  Scheduled Medications  apixaban, 5 mg, Oral, BID  atorvastatin, 80 mg, Oral, Nightly  cholestyramine, 1 packet, Oral, Q12H  clopidogrel, 75 mg, Oral, Daily  famotidine, 20 mg, Oral, Daily  furosemide, 40 mg, Oral, Daily  hydrocortisone-bacitracin-zinc oxide-nystatin, 1 application, Topical, BID  latanoprost, 1 drop, Both Eyes, Nightly  metoprolol succinate XL, 25 mg, Oral, BID  Petrolatum, 1 application, Topical, Q24H  ruxolitinib, 15 mg, Oral, BID  saccharomyces boulardii, 250 mg, Oral, BID  sertraline, 100 mg, Oral, Daily  sodium bicarbonate, 650 mg, Oral, TID    Infusions   Diet  Diet: Regular/House Diet, Gastrointestinal Diets; Low Irritant; Texture: Regular Texture (IDDSI 7); Fluid Consistency: Thin (IDDSI 0)    I have personally reviewed:  [x]  Laboratory   []  Microbiology   []  Radiology   [x]  EKG/Telemetry  []  Cardiology/Vascular   []  Pathology    []  Records    Assessment/Plan     Active Hospital Problems    Diagnosis  POA   • **Rotavirus enteritis [A08.0]  Yes   • Moderate Malnutrition (HCC) [E44.0]  Yes   • 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 [Z22.1]  Not Applicable   • KARLI (acute kidney injury) [N17.9]  Yes   • Acute renal failure, unspecified  acute renal failure type [N17.9]  Yes   • Epigastric abdominal pain [R10.13]  Yes   • Acute on chronic diastolic heart failure [I50.33]  No   • Hypertension [I10]  Yes   • CAD (coronary artery disease) [I25.10]  Yes   • Myeloproliferative disorder [D47.1]  Yes   • Paroxysmal atrial fibrillation [I48.0]  Yes   • Type 2 diabetes mellitus with circulatory disorder, 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   Rotavirus Enteritis  - improving overall but had chronic diarrhea prior to her acute illness-this is also improving with her new regimen  - continue low irritant diet, cholestyramine, and PRN imodium  - Cdiff PCR is positive with a negative antigen, indicating colonization rather than infection-no specific treatment for this is indicated    KARLI  - secondary to above, resolved with IVF    Chest Pain/Dyspnea/Acute on Chronic Diastolic CHF/CAD  - CP and dyspnea resolved; troponin elevation flat and non-specific  - volume status improved with IV lasix, resumed PO dosing  - appreciate cardiology recs    PAF  - rate controlled, continue metoprolol  - continue AC with eliquis    Type 2 DM  - BG acceptable, no need for insulin coverage at this time    Myeloproliferative Disorder  - has chronically elevated WBC and anemia and these values have been consistent with her baseline  - on Jakafi  - follows with Dr. Reinoso of [facility]    Abnormal UA  - looks contaminated  - no UTI symptoms and clinically improving without antibiotics  - no specific treatment for this is indicated    Eliquis (home med) for DVT prophylaxis.  DNR.  Discussed with patient, nursing staff, primary care provider and care team on multidisciplinary rounds.  Anticipate discharge to SNU facility once precert has been obtained. Insurance denied rehab and family has filed an expedited appeal-we will await that decision.    eKnton Meeks MD  [facility]  05/12/23  16:58 EDT

## 2023-05-12 NOTE — THERAPY TREATMENT NOTE
Patient Name: Catherine Boyer  : 1940    MRN: [id number]                              Today's Date: 2023       Admit Date: 5/3/2023    Visit Dx:     ICD-10-CM ICD-9-CM   1. Acute renal failure, unspecified acute renal failure type  N17.9 584.9   2. Hypokalemia  E87.6 276.8   3. Nausea vomiting and diarrhea  R11.2 787.91    R19.7 787.01   4. Weight loss  R63.4 783.21     Patient Active Problem List   Diagnosis   • CAD (coronary artery disease)   • Nonbacterial thrombotic endocarditis   • Paroxysmal atrial fibrillation   • Myeloproliferative disorder   • Microcytic anemia   • Type 2 diabetes mellitus with circulatory disorder, without long-term current use of insulin   • GERD (gastroesophageal reflux disease)   • Hypertension   • Personal history of transient ischemic attack (TIA), 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 deficits   • Porcelain gallbladder   • Breast cancer   • Acute on chronic diastolic heart failure   • Cholecystitis   • Urinary tract infection without hematuria, site unspecified   • Epigastric abdominal pain   • Gastritis   • Atherosclerosis of abdominal aorta   • APC (atrial premature contractions)   • Rotavirus enteritis   • KARLI (acute kidney injury)   • Acute renal failure, unspecified acute renal failure type   • Moderate Malnutrition (HCC)   • 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     Past Medical History:   Diagnosis Date   • Atherosclerosis of abdominal aorta 2023   • Atrial fibrillation    • Breast cancer    • CAD (coronary artery disease)     NSTEMI 2022: 90% ostial LAD, 99% D1, 70% mid-distal LAD (medical therapy). She received two stents (2.5x18 and 2.5x26mm Finesse LEFTY) but I don't know which one went to which lesion.   • Carotid atherosclerosis    • Chronic diastolic (congestive) heart failure    • COVID 10/29/2022   • GERD (gastroesophageal reflux disease)    • Glaucoma    • History of cataract    • Hypertension    • Microcytic anemia     per Dr. oleg pate office  note  6/30/22-dd   • Myeloproliferative disorder     JAK2 positive   • Nonbacterial thrombotic endocarditis     6/2021: 4x5mm vegetation on the ventricular surface of the anterior MV, negative blood cultures   • Porcelain gallbladder    • PUD (peptic ulcer disease)    • TIA (transient ischemic attack)    • Type 2 diabetes mellitus    • Type 2 diabetes mellitus with circulatory disorder, without long-term current use of insulin 7/14/2022   • Upper GI bleed      Past Surgical History:   Procedure Laterality Date   • BREAST LUMPECTOMY  1999   • CARDIAC CATHETERIZATION N/A 09/02/2022    Procedure: Coronary angiography;  Surgeon: Guero Verde MD;  Location: [facility] CATH INVASIVE LOCATION;  Service: Cardiovascular;  Laterality: N/A;   • CARDIAC CATHETERIZATION N/A 09/02/2022    Procedure: Stent LEFTY coronary;  Surgeon: Guero Verde MD;  Location: [facility] CATH INVASIVE LOCATION;  Service: Cardiovascular;  Laterality: N/A;   • CATARACT EXTRACTION  2011   • CHOLECYSTECTOMY     • CHOLECYSTECTOMY WITH INTRAOPERATIVE CHOLANGIOGRAM N/A 11/28/2022    Procedure: CHOLECYSTECTOMY LAPAROSCOPIC INTRAOPERATIVE CHOLANGIOGRAM;  Surgeon: Dani Grover Jr., MD;  Location: [facility] MAIN OR;  Service: General;  Laterality: N/A;   • COLONOSCOPY N/A 02/09/2023    Procedure: COLONOSCOPY TO CECUM & T.I.;  Surgeon: Guero Ferris MD;  Location: [facility] ENDOSCOPY;  Service: Gastroenterology;  Laterality: N/A;  PRE- MELENA, GI BLEED  POST- DIVERTICULOSIS, INT HEMORRHOIDS   • ENDOSCOPY N/A 01/25/2023    Procedure: ESOPHAGOGASTRODUODENOSCOPY WITH BIOPSIES;  Surgeon: Charles Diaz MD;  Location: [facility] ENDOSCOPY;  Service: Gastroenterology;  Laterality: N/A;  pre: abd pain, nausea  post: hiatal hernia, mild gastritis, sloughing of the esophagus   • ENTEROSCOPY SMALL BOWEL N/A 02/09/2023    Procedure: ENTEROSCOPY SMALL BOWEL;  Surgeon: Guero Ferris MD;  Location: [facility] ENDOSCOPY;  Service: Gastroenterology;  Laterality: N/A;  PRE- MELENA, GI  BLEED  POST- HIATAL HERNIA   • JOINT REPLACEMENT     • UPPER GASTROINTESTINAL ENDOSCOPY        General Information     Row Name 05/12/23 1313          Physical Therapy Time and Intention    Document Type therapy note (daily note)  -     Mode of Treatment physical therapy  -     Row Name 05/12/23 1313          General Information    Existing Precautions/Restrictions fall;oxygen therapy device and L/min  -     Row Name 05/12/23 1313          Cognition    Orientation Status (Cognition) oriented x 3  -           User Key  (r) = Recorded By, (t) = Taken By, (c) = Cosigned By    Initials Name Provider Type     Sarah Su PTA Physical Therapist Assistant               Mobility     Row Name 05/12/23 1314          Bed Mobility    Bed Mobility supine-sit;sit-supine  -     Supine-Sit [place] (Bed Mobility) supervision  -     Sit-Supine [place] (Bed Mobility) supervision  -     Assistive Device (Bed Mobility) bed rails;head of bed elevated  -     Row Name 05/12/23 1314          Sit-Stand Transfer    Sit-Stand [place] (Transfers) contact guard  -     Assistive Device (Sit-Stand Transfers) walker, front-wheeled  -     Row Name 05/12/23 1314          Gait/Stairs (Locomotion)    [place] Level (Gait) contact guard;minimum assist (75% patient effort)  -     Assistive Device (Gait) walker, front-wheeled  -     Distance in Feet (Gait) 80  -     Deviations/Abnormal Patterns (Gait) jeanna decreased;stride length decreased  -     Bilateral Gait Deviations forward flexed posture  -     Comment, (Gait/Stairs) 1 LOB requiring min A to correct  -           User Key  (r) = Recorded By, (t) = Taken By, (c) = Cosigned By    Initials Name Provider Type     Sarah Su PTA Physical Therapist Assistant               Obj/Interventions     Row Name 05/12/23 1317          Motor Skills    Therapeutic Exercise --  seated AP and LAQ x10 reps  -           User Key  (r) =  Recorded By, (t) = Taken By, (c) = Cosigned By    Initials Name Provider Type    Sarah Donovan PTA Physical Therapist Assistant               Goals/Plan    No documentation.                Clinical Impression     Row Name 05/12/23 1318          Pain    Pretreatment Pain Rating 0/10 - no pain  -     Posttreatment Pain Rating 0/10 - no pain  -     Row Name 05/12/23 1318          Positioning and Restraints    Pre-Treatment Position in bed  -     Post Treatment Position bed  -SM     In Bed supine;call light within reach;encouraged to call for assist;exit alarm on  -           User Key  (r) = Recorded By, (t) = Taken By, (c) = Cosigned By    Initials Name Provider Type    Sarah Donovan PTA Physical Therapist Assistant               Outcome Measures     Row Name 05/12/23 1318          How much help from another person do you currently need...    Turning from your back to your side while in flat bed without using bedrails? 3  -SM     Moving from lying on back to sitting on the side of a flat bed without bedrails? 3  -SM     Moving to and from a bed to a chair (including a wheelchair)? 3  -SM     Standing up from a chair using your arms (e.g., wheelchair, bedside chair)? 3  -SM     Climbing 3-5 steps with a railing? 2  -SM     To walk in hospital room? 3  -SM     AM-PAC 6 Clicks Score (PT) 17  -SM     Highest level of mobility 5 --> Static standing  -     Row Name 05/12/23 1318          Functional Assessment    Outcome Measure Options AM-PAC 6 Clicks Basic Mobility (PT)  -SM           User Key  (r) = Recorded By, (t) = Taken By, (c) = Cosigned By    Initials Name Provider Type    Sarah Donovan PTA Physical Therapist Assistant                             Physical Therapy Education     Title: PT OT SLP Therapies (Done)     Topic: Physical Therapy (Done)     Point: Mobility training (Done)     Learning Progress Summary           Patient Acceptance, E,TB,D, VU,NR by  at 5/12/2023 0696     Acceptance, E, VU by DB at 5/10/2023 1643    Acceptance, E,TB, VU,NR by ST at 5/9/2023 1029    Acceptance, E, VU by LW at 5/7/2023 1445                   Point: Home exercise program (Done)     Learning Progress Summary           Patient Acceptance, E,TB,D, VU,NR by  at 5/12/2023 1319    Acceptance, E, VU by DB at 5/10/2023 1643    Acceptance, E, VU by LW at 5/7/2023 1445                   Point: Body mechanics (Done)     Learning Progress Summary           Patient Acceptance, E,TB,D, VU,NR by  at 5/12/2023 1319    Acceptance, E, VU by DB at 5/10/2023 1643    Acceptance, E,TB, VU,NR by ST at 5/9/2023 1029    Acceptance, E, VU by LW at 5/7/2023 1445                   Point: Precautions (Done)     Learning Progress Summary           Patient Acceptance, E,TB,D, VU,NR by  at 5/12/2023 1319    Acceptance, E, VU by DB at 5/10/2023 1643    Acceptance, E, VU by LW at 5/7/2023 1445                               User Key     Initials Effective Dates Name Provider Type Discipline     03/07/18 -  Sarah Su, PTA Physical Therapist Assistant PT    DB 06/16/21 -  Kathryn Zimmerman, PT Physical Therapist PT    LW 06/10/22 -  Bri Reynolds, PT Physical Therapist PT    ST 09/22/22 -  Tiana Pressley, PT Physical Therapist PT              PT Recommendation and Plan     Plan of Care Reviewed With: patient  Progress: improving  Outcome Evaluation: Pt tolerated treatment well this date. Increased gait distance to 80ft w/ Rw and CGA, though required min A to correct 1 LOB. Limited overall d/t fatigue, no pain reported. Pt also completed a few seated and supine LE exercises, and encouraged pt to ambulate w/ nsg later.     Time Calculation:    PT Charges     Row Name 05/12/23 1320             Time Calculation    Start Time 1126  -      Stop Time 1149  -      Time Calculation (min) 23 min  -      PT Received On 05/12/23  -      PT - Next Appointment 05/15/23  -            User Key  (r) = Recorded By, (t) =  Taken By, (c) = Cosigned By    Initials Name Provider Type     Sarah Su PTA Physical Therapist Assistant              Therapy Charges for Today     Code Description Service Date Service Provider Modifiers Qty    24174599525 HC PT THER PROC EA 15 MIN 5/12/2023 Sarah Su PTA GP 1    82304956959 HC GAIT TRAINING EA 15 MIN 5/12/2023 Sarah Su PTA GP 1          PT G-Codes  Outcome Measure Options: AM-PAC 6 Clicks Basic Mobility (PT)  AM-PAC 6 Clicks Score (PT): 17  PT Discharge Summary  Anticipated Discharge Disposition (PT): skilled nursing facility    Sarah Su PTA  5/12/2023

## 2023-05-12 NOTE — DISCHARGE SUMMARY
Date of Admission: 5/3/2023  Date of Discharge:  5/12/2023  Primary Care Physician: Kristi Moreau APRN     Discharge Diagnosis:  Active Hospital Problems    Diagnosis  POA   • **Rotavirus enteritis [A08.0]  Yes   • Moderate Malnutrition (HCC) [E44.0]  Yes   • 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 [Z22.1]  Not Applicable   • KARLI (acute kidney injury) [N17.9]  Yes   • Acute renal failure, unspecified acute renal failure type [N17.9]  Yes   • Epigastric abdominal pain [R10.13]  Yes   • Acute on chronic diastolic heart failure [I50.33]  No   • Hypertension [I10]  Yes   • CAD (coronary artery disease) [I25.10]  Yes   • Myeloproliferative disorder [D47.1]  Yes   • Paroxysmal atrial fibrillation [I48.0]  Yes   • Type 2 diabetes mellitus with circulatory disorder, 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       Presenting Problem/History of Present Illness:  Hypokalemia [E87.6]  Weight loss [R63.4]  Nausea vomiting and diarrhea [R11.2, R19.7]  Acute renal failure, unspecified acute renal failure type [N17.9]     Hospital Course:  The patient is a 83 y.o. female with a history of HTN, HLD, CAD, Myeloproliferative disorder, PAF, Type 2 DM, chronic diastolic CHF, chronic hypoxic respiratory failure on 3L NC at home with activity and at night, and chronic diarrhea who presented with vomiting and diarrhea. Please see admission H&P for further details. She was found to have rotavirus enteritis and was given supportive care. She was found to be colonized with cdiff but no active infection was evident. She was started on cholestyramine and prn imodium and her symptoms slowly improved. Her blood counts remained within her previously known baseline. She did have an episode of acute on chronic CHF and this responded well to lasix. She had some chest discomfort as well and was evaluated by cardiology. She had no evidence of ACS and her pain resolved.   She is  "medically stable and is doing much better with physical therapy. She will be discharged back to assisted living and will need to keep close follow up with her PCP. She is also scheduled to follow up with GI as they are considering a liver biopsy at some point for chronically elevated alkaline phosphatase and weight loss when her acute issues resolve.     Exam Today:  Blood pressure 134/66, pulse 82, temperature 98.3 °F (36.8 °C), temperature source Oral, resp. rate 18, height 154 cm (60.63\"), weight 60.6 kg (133 lb 9.6 oz), SpO2 91 %.  Vitals and nursing note reviewed.   Constitutional:       General: She is not in acute distress.     Appearance: She is not toxic-appearing or diaphoretic.   HENT:      Head: Normocephalic and atraumatic.      Nose: Nose normal.      Mouth/Throat:      Mouth: Mucous membranes are moist.      Pharynx: Oropharynx is clear.   Eyes:      Conjunctiva/sclera: Conjunctivae normal.      Pupils: Pupils are equal, round, and reactive to light.   Cardiovascular:      Rate and Rhythm: Normal rate and regular rhythm.      Pulses: Normal pulses.   Pulmonary:      Effort: Pulmonary effort is normal.      Breath sounds: Normal breath sounds.   Abdominal:      General: Bowel sounds are normal.      Palpations: Abdomen is soft.      Tenderness: There is no abdominal tenderness.   Musculoskeletal:         General: No swelling or tenderness.      Cervical back: Normal range of motion and neck supple.   Skin:     General: Skin is warm and dry.      Capillary Refill: Capillary refill takes less than 2 seconds.   Neurological:      General: No focal deficit present.      Mental Status: She is alert.   Psychiatric:         Mood and Affect: Mood normal.         Behavior: Behavior normal.     Procedures Performed:      Consults:   Consults     Date and Time Order Name Status Description    5/5/2023 12:23 PM Inpatient Cardiology Consult Completed     5/3/2023  4:10 PM [facility] (on-call MD unless specified) Details   "           Discharge Disposition:  Home or Self Care    Discharge Medications:     Discharge Medications      New Medications      Instructions Start Date   cholestyramine 4 g packet  Commonly known as: QUESTRAN   1 packet, Oral, Every 12 Hours Scheduled      loperamide 2 MG capsule  Commonly known as: IMODIUM   2 mg, Oral, 4 Times Daily PRN      saccharomyces boulardii 250 MG capsule  Commonly known as: FLORASTOR   250 mg, Oral, 2 Times Daily      sodium bicarbonate 650 MG tablet   650 mg, Oral, 3 Times Daily         Continue These Medications      Instructions Start Date   acetaminophen 500 MG tablet  Commonly known as: TYLENOL   500 mg, Oral, As Needed      apixaban 5 MG tablet tablet  Commonly known as: ELIQUIS   5 mg, Oral, 2 Times Daily      atorvastatin 80 MG tablet  Commonly known as: LIPITOR   80 mg, Oral, Nightly      clopidogrel 75 MG tablet  Commonly known as: PLAVIX   TAKE 1 TABLET DAILY      empagliflozin 10 MG tablet tablet  Commonly known as: JARDIANCE   10 mg, Oral, Daily      esomeprazole 40 MG capsule  Commonly known as: nexIUM   40 mg, Oral, Every Morning Before Breakfast      furosemide 40 MG tablet  Commonly known as: LASIX   40 mg, Oral, Daily      Gas Relief 80 MG chewable tablet  Generic drug: simethicone   Chew 1 tablet by mouth 4 (Four) Times a Day As Needed for Flatulence.      Jakafi 15 MG tablet  Generic drug: ruxolitinib   15 mg, Oral, 2 Times Daily      latanoprost 0.005 % ophthalmic solution  Commonly known as: XALATAN   1 drop, Both Eyes      metoprolol succinate XL 25 MG 24 hr tablet  Commonly known as: TOPROL-XL   25 mg, Oral, 2 Times Daily      ondansetron 4 MG tablet  Commonly known as: Zofran   4 mg, Oral, Every 8 Hours PRN      sertraline 100 MG tablet  Commonly known as: ZOLOFT   100 mg, Oral, Daily      sucralfate 1 g tablet  Commonly known as: CARAFATE   1 g, Oral, 4 Times Daily      traZODone 50 MG tablet  Commonly known as: DESYREL   25-50 mg, Oral, Every Night at  Bedtime         Stop These Medications    lisinopril 2.5 MG tablet  Commonly known as: PRINIVIL,ZESTRIL     pantoprazole 40 MG EC tablet  Commonly known as: PROTONIX            Discharge Diet:   Diet Instructions     Diet: Gastrointestinal Diets; Low Irritant; Regular Texture (IDDSI 7); Thin (IDDSI 0)      Discharge Diet: Gastrointestinal Diets    Gastrointestinal Diet: Low Irritant    Texture: Regular Texture (IDDSI 7)    Fluid Consistency: Thin (IDDSI 0)          Activity at Discharge:   Activity Instructions     Activity as Tolerated            Follow-up Appointments:  Future Appointments   Date Time Provider Department Center   8/2/2023  9:00 AM Albin Barriga MD [facility]     Additional Instructions for the Follow-ups that You Need to Schedule     Discharge Follow-up with PCP   As directed       Currently Documented PCP:    Kristi Moreau APRN    PCP Phone Number:    [phone]     Follow Up Details: 1 week                Kenton Meeks MD  05/12/23  15:15 EDT    Time Spent on Discharge Activities: Greater than 30 minutes.

## 2023-05-12 NOTE — PLAN OF CARE
Problem: Adult Inpatient Plan of Care  Goal: Plan of Care Review  Outcome: Ongoing, Progressing     Problem: Adult Inpatient Plan of Care  Goal: Patient-Specific Goal (Individualized)  Outcome: Ongoing, Progressing     Problem: Adult Inpatient Plan of Care  Goal: Absence of Hospital-Acquired Illness or Injury  Outcome: Ongoing, Progressing  Intervention: Identify and Manage Fall Risk  Recent Flowsheet Documentation  Taken 5/12/2023 1600 by Paloma Kinney RN  Safety Promotion/Fall Prevention:   activity supervised   assistive device/personal items within reach  Taken 5/12/2023 1425 by Paloma Kinney RN  Safety Promotion/Fall Prevention:   assistive device/personal items within reach   activity supervised  Taken 5/12/2023 1200 by Paloma Kinney RN  Safety Promotion/Fall Prevention:   activity supervised   assistive device/personal items within reach  Intervention: Prevent Skin Injury  Recent Flowsheet Documentation  Taken 5/12/2023 1600 by Paloma Kinney RN  Body Position: position changed independently  Taken 5/12/2023 1425 by Paloma Kinney RN  Body Position: position changed independently  Taken 5/12/2023 1200 by Paloma Kinney RN  Body Position: position changed independently  Skin Protection:   adhesive use limited   incontinence pads utilized  Intervention: Prevent and Manage VTE (Venous Thromboembolism) Risk  Recent Flowsheet Documentation  Taken 5/12/2023 1600 by Paloma Kinney RN  Activity Management: activity encouraged  Taken 5/12/2023 1425 by Paloma Kinney RN  Activity Management: activity encouraged  Taken 5/12/2023 1200 by Paloma Kinney RN  Activity Management: activity encouraged  Range of Motion: active ROM (range of motion) encouraged  Intervention: Prevent Infection  Recent Flowsheet Documentation  Taken 5/12/2023 1600 by Paloma Kinney RN  Infection Prevention: visitors restricted/screened  Taken 5/12/2023 1425 by Paloma Kinney RN  Infection Prevention: rest/sleep promoted  Taken  5/12/2023 1200 by Paloma Kinney RN  Infection Prevention: rest/sleep promoted     Problem: Adult Inpatient Plan of Care  Goal: Absence of Hospital-Acquired Illness or Injury  Intervention: Identify and Manage Fall Risk  Recent Flowsheet Documentation  Taken 5/12/2023 1600 by Paloma Kinney RN  Safety Promotion/Fall Prevention:   activity supervised   assistive device/personal items within reach  Taken 5/12/2023 1425 by Paloma Kinney RN  Safety Promotion/Fall Prevention:   assistive device/personal items within reach   activity supervised  Taken 5/12/2023 1200 by Paloma Kinney RN  Safety Promotion/Fall Prevention:   activity supervised   assistive device/personal items within reach   Goal Outcome Evaluation:

## 2023-05-12 NOTE — PLAN OF CARE
Goal Outcome Evaluation:  Plan of Care Reviewed With: patient        Progress: improving  Outcome Evaluation: Skin care done after incontinent. No c/o pain. Weight shifted as much, able to self turn in bed. VSS, JOSE. Precert pending.

## 2023-05-13 LAB
ANION GAP SERPL CALCULATED.3IONS-SCNC: 12 MMOL/L (ref 5–15)
BASOPHILS # BLD MANUAL: 0.4 10*3/MM3 (ref 0–0.2)
BASOPHILS NFR BLD MANUAL: 1 % (ref 0–1.5)
BUN SERPL-MCNC: 13 MG/DL (ref 8–23)
BUN/CREAT SERPL: 26 (ref 7–25)
CALCIUM SPEC-SCNC: 8.5 MG/DL (ref 8.6–10.5)
CHLORIDE SERPL-SCNC: 96 MMOL/L (ref 98–107)
CO2 SERPL-SCNC: 30 MMOL/L (ref 22–29)
CREAT SERPL-MCNC: 0.5 MG/DL (ref 0.57–1)
DEPRECATED RDW RBC AUTO: 56.7 FL (ref 37–54)
EGFRCR SERPLBLD CKD-EPI 2021: 92 ML/MIN/1.73
EOSINOPHIL # BLD MANUAL: 0.4 10*3/MM3 (ref 0–0.4)
EOSINOPHIL NFR BLD MANUAL: 1 % (ref 0.3–6.2)
ERYTHROCYTE [DISTWIDTH] IN BLOOD BY AUTOMATED COUNT: 17.1 % (ref 12.3–15.4)
GLUCOSE SERPL-MCNC: 106 MG/DL (ref 65–99)
HCT VFR BLD AUTO: 26 % (ref 34–46.6)
HGB BLD-MCNC: 8.3 G/DL (ref 12–15.9)
LYMPHOCYTES # BLD MANUAL: 5.2 10*3/MM3 (ref 0.7–3.1)
LYMPHOCYTES NFR BLD MANUAL: 15 % (ref 5–12)
MCH RBC QN AUTO: 30.3 PG (ref 26.6–33)
MCHC RBC AUTO-ENTMCNC: 31.9 G/DL (ref 31.5–35.7)
MCV RBC AUTO: 94.9 FL (ref 79–97)
MONOCYTES # BLD: 6 10*3/MM3 (ref 0.1–0.9)
NEUTROPHILS # BLD AUTO: 27.99 10*3/MM3 (ref 1.7–7)
NEUTROPHILS NFR BLD MANUAL: 70 % (ref 42.7–76)
PLAT MORPH BLD: NORMAL
PLATELET # BLD AUTO: 228 10*3/MM3 (ref 140–450)
PMV BLD AUTO: 11.9 FL (ref 6–12)
POTASSIUM SERPL-SCNC: 3.2 MMOL/L (ref 3.5–5.2)
POTASSIUM SERPL-SCNC: 4.5 MMOL/L (ref 3.5–5.2)
RBC # BLD AUTO: 2.74 10*6/MM3 (ref 3.77–5.28)
RBC MORPH BLD: NORMAL
SODIUM SERPL-SCNC: 138 MMOL/L (ref 136–145)
VARIANT LYMPHS NFR BLD MANUAL: 13 % (ref 19.6–45.3)
WBC MORPH BLD: NORMAL
WBC NRBC COR # BLD: 39.98 10*3/MM3 (ref 3.4–10.8)

## 2023-05-13 PROCEDURE — 63710000001 RUXOLITINIB 15 MG TABLET: Performed by: INTERNAL MEDICINE

## 2023-05-13 PROCEDURE — 80048 BASIC METABOLIC PNL TOTAL CA: CPT | Performed by: INTERNAL MEDICINE

## 2023-05-13 PROCEDURE — 85025 COMPLETE CBC W/AUTO DIFF WBC: CPT | Performed by: INTERNAL MEDICINE

## 2023-05-13 PROCEDURE — 85007 BL SMEAR W/DIFF WBC COUNT: CPT | Performed by: INTERNAL MEDICINE

## 2023-05-13 PROCEDURE — 84132 ASSAY OF SERUM POTASSIUM: CPT | Performed by: HOSPITALIST

## 2023-05-13 PROCEDURE — C9399 UNCLASSIFIED DRUGS OR BIOLOG: HCPCS | Performed by: INTERNAL MEDICINE

## 2023-05-13 RX ADMIN — Medication 10 ML: at 09:45

## 2023-05-13 RX ADMIN — METOPROLOL SUCCINATE 25 MG: 25 TABLET, EXTENDED RELEASE ORAL at 09:45

## 2023-05-13 RX ADMIN — SODIUM BICARBONATE 650 MG: 650 TABLET ORAL at 20:46

## 2023-05-13 RX ADMIN — PETROLATUM 1 APPLICATION: 420 OINTMENT TOPICAL at 09:46

## 2023-05-13 RX ADMIN — ZINC OXIDE 1 APPLICATION: 200 OINTMENT TOPICAL at 20:46

## 2023-05-13 RX ADMIN — Medication 250 MG: at 20:46

## 2023-05-13 RX ADMIN — FUROSEMIDE 40 MG: 40 TABLET ORAL at 09:46

## 2023-05-13 RX ADMIN — APIXABAN 5 MG: 5 TABLET, FILM COATED ORAL at 20:46

## 2023-05-13 RX ADMIN — ZINC OXIDE 1 APPLICATION: 200 OINTMENT TOPICAL at 09:46

## 2023-05-13 RX ADMIN — POTASSIUM CHLORIDE 40 MEQ: 750 TABLET, EXTENDED RELEASE ORAL at 16:53

## 2023-05-13 RX ADMIN — SODIUM BICARBONATE 650 MG: 650 TABLET ORAL at 09:45

## 2023-05-13 RX ADMIN — LATANOPROST 1 DROP: 50 SOLUTION OPHTHALMIC at 20:46

## 2023-05-13 RX ADMIN — POTASSIUM CHLORIDE 40 MEQ: 750 TABLET, EXTENDED RELEASE ORAL at 12:03

## 2023-05-13 RX ADMIN — CHOLESTYRAMINE 1 PACKET: 4 POWDER, FOR SUSPENSION ORAL at 20:45

## 2023-05-13 RX ADMIN — APIXABAN 5 MG: 5 TABLET, FILM COATED ORAL at 09:46

## 2023-05-13 RX ADMIN — CHOLESTYRAMINE 1 PACKET: 4 POWDER, FOR SUSPENSION ORAL at 09:45

## 2023-05-13 RX ADMIN — ATORVASTATIN CALCIUM 80 MG: 80 TABLET, FILM COATED ORAL at 20:46

## 2023-05-13 RX ADMIN — Medication 250 MG: at 09:46

## 2023-05-13 RX ADMIN — CLOPIDOGREL BISULFATE 75 MG: 75 TABLET, FILM COATED ORAL at 09:45

## 2023-05-13 RX ADMIN — RUXOLITINIB 15 MG: 15 TABLET ORAL at 20:44

## 2023-05-13 RX ADMIN — SERTRALINE 100 MG: 100 TABLET, FILM COATED ORAL at 09:45

## 2023-05-13 RX ADMIN — RUXOLITINIB 15 MG: 15 TABLET ORAL at 09:46

## 2023-05-13 RX ADMIN — SODIUM BICARBONATE 650 MG: 650 TABLET ORAL at 16:53

## 2023-05-13 RX ADMIN — METOPROLOL SUCCINATE 25 MG: 25 TABLET, EXTENDED RELEASE ORAL at 20:46

## 2023-05-13 RX ADMIN — FAMOTIDINE 20 MG: 20 TABLET, FILM COATED ORAL at 09:46

## 2023-05-13 RX ADMIN — ACETAMINOPHEN 650 MG: 325 TABLET, FILM COATED ORAL at 23:49

## 2023-05-13 NOTE — PLAN OF CARE
Problem: Adult Inpatient Plan of Care  Goal: Plan of Care Review  Outcome: Ongoing, Progressing   Goal Outcome Evaluation:   Vss.K+ replaced per protocol.Pt remains in contact spore isolation.No c/o pain or n/v.

## 2023-05-13 NOTE — PROGRESS NOTES
Name: Catherine Boyer ADMIT: 5/3/2023   : 1940  PCP: Kristi Moreau APRN    MRN: [id number] LOS: 9 days   AGE/SEX: 83 y.o. female  ROOM: [facility]     Subjective   Subjective   SLeeping and did not awaken when I came around and said her name    Review of Systems     Objective   Objective   Vital Signs  Temp:  [98.2 °F (36.8 °C)-98.6 °F (37 °C)] 98.4 °F (36.9 °C)  Heart Rate:  [69-82] 76  Resp:  [18] 18  BP: (108-134)/(55-81) 122/81  SpO2:  [90 %-98 %] 98 %  on  Flow (L/min):  [2-3] 3;   Device (Oxygen Therapy): nasal cannula  Body mass index is 26.31 kg/m².  Physical Exam  Vitals and nursing note reviewed.   Constitutional:       Appearance: She is not ill-appearing.      Comments: resting   Cardiovascular:      Rate and Rhythm: Normal rate.   Pulmonary:      Effort: Pulmonary effort is normal.   Skin:     Findings: No rash.       Results Review     I reviewed the patient's new clinical results.  Results from last 7 days   Lab Units 05/13/23  0449 05/12/23  0332 05/11/23  0330 05/10/23  0603   WBC 10*3/mm3 39.98* 42.83* 32.41* 37.28*   HEMOGLOBIN g/dL 8.3* 8.5* 8.1* 8.8*   PLATELETS 10*3/mm3 228 234 192 181     Results from last 7 days   Lab Units 23  03323  0330 05/10/23  0603   SODIUM mmol/L 138 139 142 140   POTASSIUM mmol/L 3.2* 3.5 3.6 4.0   CHLORIDE mmol/L 96* 97* 100 100   CO2 mmol/L 30.0* 31.2* 31.0* 31.0*   BUN mg/dL  10   CREATININE mg/dL 0.50* 0.64 0.58 0.51*   GLUCOSE mg/dL 106* 100* 89 84   EGFR mL/min/1.73 92.0 86.7 88.8 91.6       Results from last 7 days   Lab Units 2323  0332 23  0330 05/10/23  0603   CALCIUM mg/dL 8.5* 8.6 8.3* 9.0       No results found for: HGBA1C, POCGLU    No radiology results for the last day  I have personally reviewed all medications:  Scheduled Medications  apixaban, 5 mg, Oral, BID  atorvastatin, 80 mg, Oral, Nightly  cholestyramine, 1 packet, Oral, Q12H  clopidogrel, 75 mg, Oral, Daily  famotidine, 20  mg, Oral, Daily  furosemide, 40 mg, Oral, Daily  hydrocortisone-bacitracin-zinc oxide-nystatin, 1 application, Topical, BID  latanoprost, 1 drop, Both Eyes, Nightly  metoprolol succinate XL, 25 mg, Oral, BID  Petrolatum, 1 application, Topical, Q24H  ruxolitinib, 15 mg, Oral, BID  saccharomyces boulardii, 250 mg, Oral, BID  sertraline, 100 mg, Oral, Daily  sodium bicarbonate, 650 mg, Oral, TID    Infusions   Diet  Diet: Regular/House Diet, Gastrointestinal Diets; Low Irritant; Texture: Regular Texture (IDDSI 7); Fluid Consistency: Thin (IDDSI 0)    I have personally reviewed:  [x]  Laboratory   [x]  Microbiology   [x]  Radiology   [x]  EKG/Telemetry  [x]  Cardiology/Vascular   []  Pathology    []  Records       Assessment/Plan     Active Hospital Problems    Diagnosis  POA   • **Rotavirus enteritis [A08.0]  Yes   • Moderate Malnutrition (HCC) [E44.0]  Yes   • 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 [Z22.1]  Not Applicable   • KARLI (acute kidney injury) [N17.9]  Yes   • Acute renal failure, unspecified acute renal failure type [N17.9]  Yes   • Epigastric abdominal pain [R10.13]  Yes   • Acute on chronic diastolic heart failure [I50.33]  No   • Hypertension [I10]  Yes   • CAD (coronary artery disease) [I25.10]  Yes   • Myeloproliferative disorder [D47.1]  Yes   • Paroxysmal atrial fibrillation [I48.0]  Yes   • Type 2 diabetes mellitus with circulatory disorder, 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       83 y.o. female admitted with Rotavirus enteritis.    Rotavirus Enteritis  - improving overall but had chronic diarrhea prior to her acute illness-this is also improving with her new regimen  - continue low irritant diet, cholestyramine, and PRN imodium  - Cdiff PCR is positive with a negative antigen, indicating colonization rather than infection-no specific treatment for this is indicated     KARLI  - secondary to above, resolved with  IVF     Chest Pain/Dyspnea/Acute on Chronic Diastolic CHF/CAD  - CP and dyspnea resolved; troponin elevation flat and non-specific  - volume status improved with IV lasix, now on PO      PAF  - rate controlled, continue metoprolol  - continue AC with eliquis     Type 2 DM  - BG acceptable, no need for insulin coverage at this time     Myeloproliferative Disorder  - has chronically elevated WBC and anemia. Near baseline  - on Jakafi  - follows with Dr. Reinoso of [facility]     Abnormal UA  - looks contaminated  - no UTI symptoms and clinically improving without antibiotics  - no specific treatment for this is indicated     Eliquis (home med) for DVT prophylaxis.  DNR.  Dispo: Await decision on appeal for dispo plan. Stable    Above info reviewed and edited as appropriate.      Kenton Lopez MD  [facility]  05/13/23  18:57 EDT

## 2023-05-14 PROBLEM — N17.9 ACUTE RENAL FAILURE, UNSPECIFIED ACUTE RENAL FAILURE TYPE: Status: RESOLVED | Noted: 2023-05-04 | Resolved: 2023-05-14

## 2023-05-14 PROBLEM — A08.0 ROTAVIRUS ENTERITIS: Status: RESOLVED | Noted: 2023-05-04 | Resolved: 2023-05-14

## 2023-05-14 PROBLEM — N17.9 AKI (ACUTE KIDNEY INJURY): Status: RESOLVED | Noted: 2023-05-04 | Resolved: 2023-05-14

## 2023-05-14 PROBLEM — R10.13 EPIGASTRIC ABDOMINAL PAIN: Status: RESOLVED | Noted: 2023-01-24 | Resolved: 2023-05-14

## 2023-05-14 LAB
ANION GAP SERPL CALCULATED.3IONS-SCNC: 10.9 MMOL/L (ref 5–15)
BASOPHILS # BLD MANUAL: 0.93 10*3/MM3 (ref 0–0.2)
BASOPHILS NFR BLD MANUAL: 2 % (ref 0–1.5)
BUN SERPL-MCNC: 15 MG/DL (ref 8–23)
BUN/CREAT SERPL: 28.3 (ref 7–25)
CALCIUM SPEC-SCNC: 9.1 MG/DL (ref 8.6–10.5)
CHLORIDE SERPL-SCNC: 100 MMOL/L (ref 98–107)
CO2 SERPL-SCNC: 27.1 MMOL/L (ref 22–29)
CREAT SERPL-MCNC: 0.53 MG/DL (ref 0.57–1)
DEPRECATED RDW RBC AUTO: 58.9 FL (ref 37–54)
EGFRCR SERPLBLD CKD-EPI 2021: 90.8 ML/MIN/1.73
ERYTHROCYTE [DISTWIDTH] IN BLOOD BY AUTOMATED COUNT: 17.2 % (ref 12.3–15.4)
GLUCOSE SERPL-MCNC: 92 MG/DL (ref 65–99)
HCT VFR BLD AUTO: 25.7 % (ref 34–46.6)
HGB BLD-MCNC: 8.5 G/DL (ref 12–15.9)
LYMPHOCYTES # BLD MANUAL: 2.83 10*3/MM3 (ref 0.7–3.1)
LYMPHOCYTES NFR BLD MANUAL: 8.1 % (ref 5–12)
MCH RBC QN AUTO: 31.4 PG (ref 26.6–33)
MCHC RBC AUTO-ENTMCNC: 33.1 G/DL (ref 31.5–35.7)
MCV RBC AUTO: 94.8 FL (ref 79–97)
METAMYELOCYTES NFR BLD MANUAL: 2 % (ref 0–0)
MONOCYTES # BLD: 3.75 10*3/MM3 (ref 0.1–0.9)
MYELOCYTES NFR BLD MANUAL: 3 % (ref 0–0)
NEUTROPHILS # BLD AUTO: 36.51 10*3/MM3 (ref 1.7–7)
NEUTROPHILS NFR BLD MANUAL: 78.8 % (ref 42.7–76)
PLAT MORPH BLD: NORMAL
PLATELET # BLD AUTO: 259 10*3/MM3 (ref 140–450)
PMV BLD AUTO: 11.8 FL (ref 6–12)
POTASSIUM SERPL-SCNC: 4.9 MMOL/L (ref 3.5–5.2)
RBC # BLD AUTO: 2.71 10*6/MM3 (ref 3.77–5.28)
RBC MORPH BLD: NORMAL
SODIUM SERPL-SCNC: 138 MMOL/L (ref 136–145)
VARIANT LYMPHS NFR BLD MANUAL: 6.1 % (ref 19.6–45.3)
WBC MORPH BLD: NORMAL
WBC NRBC COR # BLD: 46.33 10*3/MM3 (ref 3.4–10.8)

## 2023-05-14 PROCEDURE — 85007 BL SMEAR W/DIFF WBC COUNT: CPT | Performed by: INTERNAL MEDICINE

## 2023-05-14 PROCEDURE — C9399 UNCLASSIFIED DRUGS OR BIOLOG: HCPCS | Performed by: INTERNAL MEDICINE

## 2023-05-14 PROCEDURE — 80048 BASIC METABOLIC PNL TOTAL CA: CPT | Performed by: INTERNAL MEDICINE

## 2023-05-14 PROCEDURE — 63710000001 RUXOLITINIB 15 MG TABLET: Performed by: INTERNAL MEDICINE

## 2023-05-14 PROCEDURE — 85025 COMPLETE CBC W/AUTO DIFF WBC: CPT | Performed by: INTERNAL MEDICINE

## 2023-05-14 RX ORDER — SENNOSIDES A AND B 8.6 MG/1
1 TABLET, FILM COATED ORAL NIGHTLY PRN
Status: DISCONTINUED | OUTPATIENT
Start: 2023-05-14 | End: 2023-05-16 | Stop reason: HOSPADM

## 2023-05-14 RX ADMIN — METOPROLOL SUCCINATE 25 MG: 25 TABLET, EXTENDED RELEASE ORAL at 09:00

## 2023-05-14 RX ADMIN — SODIUM BICARBONATE 650 MG: 650 TABLET ORAL at 16:22

## 2023-05-14 RX ADMIN — RUXOLITINIB 15 MG: 15 TABLET ORAL at 20:43

## 2023-05-14 RX ADMIN — LATANOPROST 1 DROP: 50 SOLUTION OPHTHALMIC at 20:45

## 2023-05-14 RX ADMIN — FUROSEMIDE 40 MG: 40 TABLET ORAL at 09:00

## 2023-05-14 RX ADMIN — RUXOLITINIB 15 MG: 15 TABLET ORAL at 09:02

## 2023-05-14 RX ADMIN — ATORVASTATIN CALCIUM 80 MG: 80 TABLET, FILM COATED ORAL at 20:43

## 2023-05-14 RX ADMIN — CHOLESTYRAMINE 1 PACKET: 4 POWDER, FOR SUSPENSION ORAL at 09:00

## 2023-05-14 RX ADMIN — METOPROLOL SUCCINATE 25 MG: 25 TABLET, EXTENDED RELEASE ORAL at 20:43

## 2023-05-14 RX ADMIN — CLOPIDOGREL BISULFATE 75 MG: 75 TABLET, FILM COATED ORAL at 09:00

## 2023-05-14 RX ADMIN — SERTRALINE 100 MG: 100 TABLET, FILM COATED ORAL at 09:00

## 2023-05-14 RX ADMIN — APIXABAN 5 MG: 5 TABLET, FILM COATED ORAL at 09:00

## 2023-05-14 RX ADMIN — ZINC OXIDE 1 APPLICATION: 200 OINTMENT TOPICAL at 20:45

## 2023-05-14 RX ADMIN — Medication 250 MG: at 09:00

## 2023-05-14 RX ADMIN — Medication 250 MG: at 20:43

## 2023-05-14 RX ADMIN — APIXABAN 5 MG: 5 TABLET, FILM COATED ORAL at 20:43

## 2023-05-14 RX ADMIN — SODIUM BICARBONATE 650 MG: 650 TABLET ORAL at 20:43

## 2023-05-14 RX ADMIN — ZINC OXIDE 1 APPLICATION: 200 OINTMENT TOPICAL at 09:02

## 2023-05-14 RX ADMIN — SODIUM BICARBONATE 650 MG: 650 TABLET ORAL at 09:00

## 2023-05-14 RX ADMIN — PETROLATUM 1 APPLICATION: 420 OINTMENT TOPICAL at 09:01

## 2023-05-14 RX ADMIN — FAMOTIDINE 20 MG: 20 TABLET, FILM COATED ORAL at 09:01

## 2023-05-14 NOTE — PROGRESS NOTES
Name: Catherine Boyer ADMIT: 5/3/2023   : 1940  PCP: Kristi Moreau APRN    MRN: [id number] LOS: 10 days   AGE/SEX: 83 y.o. female  ROOM: [facility]     Subjective   Subjective   Patient alert and talking today.  She feels a little constipated, says it has been 2 days since her last BM but nurse states that she did have a small 1 last evening    Review of Systems     Objective   Objective   Vital Signs  Temp:  [97.7 °F (36.5 °C)-98.4 °F (36.9 °C)] 98.1 °F (36.7 °C)  Heart Rate:  [68-76] 75  Resp:  [16-18] 18  BP: (115-127)/(49-81) 116/49  SpO2:  [98 %-99 %] 99 %  on  Flow (L/min):  [3] 3;   Device (Oxygen Therapy): nasal cannula  Body mass index is 27.03 kg/m².  Physical Exam  Vitals and nursing note reviewed.   Constitutional:       General: She is not in acute distress.     Appearance: She is not ill-appearing.   Eyes:      General: No scleral icterus.  Cardiovascular:      Rate and Rhythm: Normal rate and regular rhythm.      Heart sounds: No murmur heard.  Pulmonary:      Effort: Pulmonary effort is normal. No respiratory distress.      Breath sounds: Normal breath sounds.   Abdominal:      General: There is no distension.      Palpations: Abdomen is soft.      Tenderness: There is no abdominal tenderness.   Musculoskeletal:      Right lower leg: No edema.      Left lower leg: No edema.   Skin:     General: Skin is warm and dry.   Neurological:      Mental Status: She is alert and oriented to person, place, and time.   Psychiatric:         Mood and Affect: Mood normal.       Results Review     I reviewed the patient's new clinical results.  Results from last 7 days   Lab Units 23  0332 23  0330   WBC 10*3/mm3 46.33* 39.98* 42.83* 32.41*   HEMOGLOBIN g/dL 8.5* 8.3* 8.5* 8.1*   PLATELETS 10*3/mm3 259 228 234 192     Results from last 7 days   Lab Units 23/23  0332 23  0330   SODIUM mmol/L 138  --  138 139 142    POTASSIUM mmol/L 4.9 4.5 3.2* 3.5 3.6   CHLORIDE mmol/L 100  --  96* 97* 100   CO2 mmol/L 27.1  --  30.0* 31.2* 31.0*   BUN mg/dL 15  --  13 14 13   CREATININE mg/dL 0.53*  --  0.50* 0.64 0.58   GLUCOSE mg/dL 92  --  106* 100* 89   EGFR mL/min/1.73 90.8  --  92.0 86.7 88.8       Results from last 7 days   Lab Units 05/14/23  0512 05/13/23  0449 05/12/23  0332 05/11/23  0330   CALCIUM mg/dL 9.1 8.5* 8.6 8.3*       No results found for: HGBA1C, POCGLU    No radiology results for the last day  I have personally reviewed all medications:  Scheduled Medications  apixaban, 5 mg, Oral, BID  atorvastatin, 80 mg, Oral, Nightly  cholestyramine, 1 packet, Oral, Q12H  clopidogrel, 75 mg, Oral, Daily  famotidine, 20 mg, Oral, Daily  furosemide, 40 mg, Oral, Daily  hydrocortisone-bacitracin-zinc oxide-nystatin, 1 application, Topical, BID  latanoprost, 1 drop, Both Eyes, Nightly  metoprolol succinate XL, 25 mg, Oral, BID  Petrolatum, 1 application, Topical, Q24H  ruxolitinib, 15 mg, Oral, BID  saccharomyces boulardii, 250 mg, Oral, BID  sertraline, 100 mg, Oral, Daily  sodium bicarbonate, 650 mg, Oral, TID    Infusions   Diet  Diet: Regular/House Diet, Gastrointestinal Diets; Low Irritant; Texture: Regular Texture (IDDSI 7); Fluid Consistency: Thin (IDDSI 0)    I have personally reviewed:  [x]  Laboratory   [x]  Microbiology   [x]  Radiology   [x]  EKG/Telemetry  [x]  Cardiology/Vascular   []  Pathology    []  Records       Assessment/Plan     Active Hospital Problems    Diagnosis  POA   • Moderate Malnutrition (HCC) [E44.0]  Yes   • 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 [Z22.1]  Not Applicable   • Acute on chronic diastolic heart failure [I50.33]  No   • Hypertension [I10]  Yes   • CAD (coronary artery disease) [I25.10]  Yes   • Myeloproliferative disorder [D47.1]  Yes   • Paroxysmal atrial fibrillation [I48.0]  Yes   • Type 2 diabetes mellitus with circulatory disorder, 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 female admitted with Rotavirus enteritis.    Rotavirus Enteritis  -Resolved.  Patient now complaining of constipation, may relate to Questran use which I will DC.  Will allow stool softener as needed but would not get overly aggressive with bowel regimen  - Chronic diarrhea prior to her acute illness-as above  - Cdiff PCR is positive with a negative antigen, indicating colonization rather than infection-no specific treatment for this is indicated     Chest Pain/Dyspnea/Acute on Chronic Diastolic CHF/CAD  - CP and dyspnea resolved; troponin elevation flat and non-specific  - volume status improved with IV lasix, now on PO      PAF  - rate controlled, continue metoprolol  - continue AC with eliquis     Type 2 DM  - BG acceptable, no need for insulin coverage at this time     Myeloproliferative Disorder  - has chronically elevated WBC and anemia. Near baseline  - on Jakafi  - follows with Dr. Reinoso of [facility]     Abnormal UA  - looks contaminated  - no UTI symptoms and clinically improving without antibiotics  - no specific treatment for this is indicated     Eliquis (home med) for DVT prophylaxis.  DNR.  Dispo: Await decision on appeal for dispo plan. Stable      Kenton Lopez MD  [facility]  05/14/23  12:37 EDT

## 2023-05-14 NOTE — PLAN OF CARE
"  Problem: Adult Inpatient Plan of Care  Goal: Plan of Care Review  Outcome: Ongoing, Progressing   Goal Outcome Evaluation:   Vss.Pt c/o constipation, prune juice given, pt had large bm. Rested in bed quitelt throughout the day. Multiple scabs/open areas that bleed at times on ble, pt states \"I pick at them and don't know why I do it.\"No c/o pain or n/v.                     "

## 2023-05-14 NOTE — PLAN OF CARE
Goal Outcome Evaluation:           Progress: improving  Outcome Evaluation: VSS. Prn tylenol given x1 for abd cramps and intervention effective. Small BM tonight. NSR. 3LNC-home setting. Safety maintained. Contact spore isolation precautions maintained. No complaints or concerns per patient. Will continue to monitor.

## 2023-05-15 LAB
DEPRECATED RDW RBC AUTO: 59.8 FL (ref 37–54)
EOSINOPHIL # BLD MANUAL: 0.51 10*3/MM3 (ref 0–0.4)
EOSINOPHIL NFR BLD MANUAL: 1 % (ref 0.3–6.2)
ERYTHROCYTE [DISTWIDTH] IN BLOOD BY AUTOMATED COUNT: 17.7 % (ref 12.3–15.4)
HCT VFR BLD AUTO: 25.1 % (ref 34–46.6)
HGB BLD-MCNC: 8.3 G/DL (ref 12–15.9)
LYMPHOCYTES # BLD MANUAL: 6.62 10*3/MM3 (ref 0.7–3.1)
LYMPHOCYTES NFR BLD MANUAL: 6 % (ref 5–12)
MCH RBC QN AUTO: 31.3 PG (ref 26.6–33)
MCHC RBC AUTO-ENTMCNC: 33.1 G/DL (ref 31.5–35.7)
MCV RBC AUTO: 94.7 FL (ref 79–97)
MONOCYTES # BLD: 3.05 10*3/MM3 (ref 0.1–0.9)
MYELOCYTES NFR BLD MANUAL: 6 % (ref 0–0)
NEUTROPHILS # BLD AUTO: 37.67 10*3/MM3 (ref 1.7–7)
NEUTROPHILS NFR BLD MANUAL: 74 % (ref 42.7–76)
PLAT MORPH BLD: NORMAL
PLATELET # BLD AUTO: 284 10*3/MM3 (ref 140–450)
PMV BLD AUTO: 11.6 FL (ref 6–12)
RBC # BLD AUTO: 2.65 10*6/MM3 (ref 3.77–5.28)
RBC MORPH BLD: NORMAL
VARIANT LYMPHS NFR BLD MANUAL: 13 % (ref 19.6–45.3)
WBC MORPH BLD: NORMAL
WBC NRBC COR # BLD: 50.9 10*3/MM3 (ref 3.4–10.8)

## 2023-05-15 PROCEDURE — 97110 THERAPEUTIC EXERCISES: CPT

## 2023-05-15 PROCEDURE — 97116 GAIT TRAINING THERAPY: CPT

## 2023-05-15 PROCEDURE — 63710000001 RUXOLITINIB 15 MG TABLET: Performed by: INTERNAL MEDICINE

## 2023-05-15 PROCEDURE — 85007 BL SMEAR W/DIFF WBC COUNT: CPT | Performed by: INTERNAL MEDICINE

## 2023-05-15 PROCEDURE — C9399 UNCLASSIFIED DRUGS OR BIOLOG: HCPCS | Performed by: INTERNAL MEDICINE

## 2023-05-15 PROCEDURE — 85025 COMPLETE CBC W/AUTO DIFF WBC: CPT | Performed by: INTERNAL MEDICINE

## 2023-05-15 RX ADMIN — Medication 250 MG: at 21:24

## 2023-05-15 RX ADMIN — APIXABAN 5 MG: 5 TABLET, FILM COATED ORAL at 09:04

## 2023-05-15 RX ADMIN — APIXABAN 5 MG: 5 TABLET, FILM COATED ORAL at 21:24

## 2023-05-15 RX ADMIN — ATORVASTATIN CALCIUM 80 MG: 80 TABLET, FILM COATED ORAL at 21:24

## 2023-05-15 RX ADMIN — METOPROLOL SUCCINATE 25 MG: 25 TABLET, EXTENDED RELEASE ORAL at 21:24

## 2023-05-15 RX ADMIN — ZINC OXIDE 1 APPLICATION: 200 OINTMENT TOPICAL at 21:24

## 2023-05-15 RX ADMIN — METOPROLOL SUCCINATE 25 MG: 25 TABLET, EXTENDED RELEASE ORAL at 09:04

## 2023-05-15 RX ADMIN — CLOPIDOGREL BISULFATE 75 MG: 75 TABLET, FILM COATED ORAL at 09:04

## 2023-05-15 RX ADMIN — SODIUM BICARBONATE 650 MG: 650 TABLET ORAL at 09:04

## 2023-05-15 RX ADMIN — ZINC OXIDE 1 APPLICATION: 200 OINTMENT TOPICAL at 09:04

## 2023-05-15 RX ADMIN — PETROLATUM 1 APPLICATION: 420 OINTMENT TOPICAL at 09:04

## 2023-05-15 RX ADMIN — FUROSEMIDE 40 MG: 40 TABLET ORAL at 09:04

## 2023-05-15 RX ADMIN — SERTRALINE 100 MG: 100 TABLET, FILM COATED ORAL at 09:04

## 2023-05-15 RX ADMIN — FAMOTIDINE 20 MG: 20 TABLET, FILM COATED ORAL at 09:04

## 2023-05-15 RX ADMIN — SODIUM BICARBONATE 650 MG: 650 TABLET ORAL at 21:24

## 2023-05-15 RX ADMIN — SODIUM BICARBONATE 650 MG: 650 TABLET ORAL at 15:43

## 2023-05-15 RX ADMIN — RUXOLITINIB 15 MG: 15 TABLET ORAL at 21:23

## 2023-05-15 RX ADMIN — RUXOLITINIB 15 MG: 15 TABLET ORAL at 09:05

## 2023-05-15 RX ADMIN — Medication 250 MG: at 09:04

## 2023-05-15 RX ADMIN — LATANOPROST 1 DROP: 50 SOLUTION OPHTHALMIC at 21:24

## 2023-05-15 NOTE — PROGRESS NOTES
Name: Catherine Boyer ADMIT: 5/3/2023   : 1940  PCP: Kristi Moreau APRN    MRN: [id number] LOS: 11 days   AGE/SEX: 83 y.o. female  ROOM: [facility]     Subjective   Subjective   Patient sleepy this morning.  Did not wake up for exam    Review of Systems     Objective   Objective   Vital Signs  Temp:  [98 °F (36.7 °C)-98.4 °F (36.9 °C)] 98 °F (36.7 °C)  Heart Rate:  [67-81] 77  Resp:  [18] 18  BP: (108-124)/(51-63) 108/55  SpO2:  [92 %-98 %] 92 %  on  Flow (L/min):  [1-3] 1;   Device (Oxygen Therapy): nasal cannula  Body mass index is 25.55 kg/m².  Physical Exam  Vitals reviewed.   Constitutional:       General: She is not in acute distress.     Appearance: She is not ill-appearing.      Comments: Sleeping   Pulmonary:      Effort: No respiratory distress.       Results Review     I reviewed the patient's new clinical results.  Results from last 7 days   Lab Units 05/15/23  04323  0332   WBC 10*3/mm3 50.90* 46.33* 39.98* 42.83*   HEMOGLOBIN g/dL 8.3* 8.5* 8.3* 8.5*   PLATELETS 10*3/mm3 284 259 228 234     Results from last 7 days   Lab Units 23  0332 23  0330   SODIUM mmol/L 138  --  138 139 142   POTASSIUM mmol/L 4.9 4.5 3.2* 3.5 3.6   CHLORIDE mmol/L 100  --  96* 97* 100   CO2 mmol/L 27.1  --  30.0* 31.2* 31.0*   BUN mg/dL 15  --     CREATININE mg/dL 0.53*  --  0.50* 0.64 0.58   GLUCOSE mg/dL 92  --  106* 100* 89   EGFR mL/min/1.73 90.8  --  92.0 86.7 88.8       Results from last 7 days   Lab Units 23  0512 23  0449 23  0332 23  0330   CALCIUM mg/dL 9.1 8.5* 8.6 8.3*       No results found for: HGBA1C, POCGLU    No radiology results for the last day  I have personally reviewed all medications:  Scheduled Medications  apixaban, 5 mg, Oral, BID  atorvastatin, 80 mg, Oral, Nightly  clopidogrel, 75 mg, Oral, Daily  famotidine, 20 mg, Oral, Daily  furosemide, 40 mg, Oral,  Daily  hydrocortisone-bacitracin-zinc oxide-nystatin, 1 application, Topical, BID  latanoprost, 1 drop, Both Eyes, Nightly  metoprolol succinate XL, 25 mg, Oral, BID  Petrolatum, 1 application, Topical, Q24H  ruxolitinib, 15 mg, Oral, BID  saccharomyces boulardii, 250 mg, Oral, BID  sertraline, 100 mg, Oral, Daily  sodium bicarbonate, 650 mg, Oral, TID    Infusions   Diet  Diet: Regular/House Diet, Gastrointestinal Diets; Low Irritant; Texture: Regular Texture (IDDSI 7); Fluid Consistency: Thin (IDDSI 0)    I have personally reviewed:  [x]  Laboratory   [x]  Microbiology   [x]  Radiology   [x]  EKG/Telemetry  [x]  Cardiology/Vascular   []  Pathology    []  Records       Assessment/Plan     Active Hospital Problems    Diagnosis  POA   • Moderate Malnutrition (HCC) [E44.0]  Yes   • 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 [Z22.1]  Not Applicable   • Acute on chronic diastolic heart failure [I50.33]  No   • Hypertension [I10]  Yes   • CAD (coronary artery disease) [I25.10]  Yes   • Myeloproliferative disorder [D47.1]  Yes   • Paroxysmal atrial fibrillation [I48.0]  Yes   • Type 2 diabetes mellitus with circulatory disorder, 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 female admitted with Rotavirus enteritis.    Rotavirus Enteritis, resolved  - Chronic diarrhea prior to her acute illness  - Cdiff PCR is positive with a negative antigen, indicating colonization rather than infection-no specific treatment for this is indicated     Chest Pain/Dyspnea/Acute on Chronic Diastolic CHF/CAD  - CP and dyspnea resolved; troponin elevation flat and non-specific  - volume status improved with IV lasix, now on PO       PAF  - rate controlled, continue metoprolol  - continue AC with eliquis     Type 2 DM  - BG acceptable, no need for insulin coverage at this time     Myeloproliferative Disorder  - has chronically elevated WBC and anemia.   - on Jakafi  - follows with Dr. Reinoso of [facility]     Abnormal UA  - looks contaminated  - no UTI symptoms and clinically improving without antibiotics  - no specific treatment for this is indicated     Eliquis (home med) for DVT prophylaxis.  DNR.  Dispo: Await decision on appeal for dispo plan.  Remains medically stable and not in need of acute hospitalization      Kenton Lopez MD  [facility]  05/15/23  16:10 EDT

## 2023-05-15 NOTE — PLAN OF CARE
Goal Outcome Evaluation:  Plan of Care Reviewed With: patient        Progress: improving  Outcome Evaluation: Contact spore precaution maintained. Pt's home supplied med JAKAFI has only one pill left in the bottle, pt aware and will talk with her daughter per pt. Skin care done. VSS, 2-3L NC, will continue to monitor.

## 2023-05-15 NOTE — PLAN OF CARE
Problem: Adult Inpatient Plan of Care  Goal: Plan of Care Review  Outcome: Ongoing, Progressing  Flowsheets (Taken 5/15/2023 1727)  Progress: improving  Plan of Care Reviewed With: patient  Outcome Evaluation: VSS. On RA- 1L O2 per NC. Denies N/V/D or SOA. Pt ambulated with PT in hallway. Probable DC back to assisted living tomorrow. Pt stable and needs met at this time.

## 2023-05-15 NOTE — PLAN OF CARE
Goal Outcome Evaluation:  Plan of Care Reviewed With: patient        Progress: improving  Outcome Evaluation: Pt tolerated treatment well this date. Increased gait distance to 140ft w/ Rw and SBA-CGA. No LOBs noted, and pt ambulated w/out O2 w/ no c/o SOA. Pt also completed a few supine LE exercises, and was encouraged to ambulate w/ nsg later.

## 2023-05-15 NOTE — PROGRESS NOTES
"Nutrition Services    Patient Name:  Catherine Boyer  YOB: 1940  MRN: [id number]  Admit Date:  5/3/2023   Assessment Date:  05/15/23    FOLLOW UP - CLINICAL NUTRITION  Encounter Information         Reason for Encounter RD f/u.       Current Issues Diet upgraded to low irritant, regular texture, thin liquids (5/7) per MD. Limited documentation of PO intake. Ate ~50-75% x 2 meals (5/13) per I/Os. Boost Breeze TID ordered with meals. +3 BM past 24 hrs per I/Os. RD will continue to follow course.      Current Nutrition Orders & Evaluation of Intake       Oral Nutrition     Current PO Diet Diet: Regular/House Diet, Gastrointestinal Diets; Low Irritant; Texture: Regular Texture (IDDSI 7); Fluid Consistency: Thin (IDDSI 0)   Supplement Boost Breeze, TID   PO Evaluation     % PO Intake 50-75% x 2 meals    # of Days Evaluated 5/13    Factors Affecting Intake  decreased appetite, diarrhea   --  Anthropometrics          Height    Weight Height: 154 cm (60.63\")  Weight: 60.6 kg (133 lb 9.6 oz) (05/15/23 0427)    BMI kg/m2 Body mass index is 25.55 kg/m².  Overweight (25 - 29.9)    Weight trend Stable     Labs        Pertinent Labs Reviewed, listed below     Results from last 7 days   Lab Units 05/14/23 0512 05/13/23 2029 05/13/23 0449 05/12/23 0332   SODIUM mmol/L 138  --  138 139   POTASSIUM mmol/L 4.9 4.5 3.2* 3.5   CHLORIDE mmol/L 100  --  96* 97*   CO2 mmol/L 27.1  --  30.0* 31.2*   BUN mg/dL 15  --  13 14   CREATININE mg/dL 0.53*  --  0.50* 0.64   CALCIUM mg/dL 9.1  --  8.5* 8.6   GLUCOSE mg/dL 92  --  106* 100*     Results from last 7 days   Lab Units 05/15/23  0432   HEMOGLOBIN g/dL 8.3*   HEMATOCRIT % 25.1*   WBC 10*3/mm3 50.90*     Results from last 7 days   Lab Units 05/15/23  0432 05/14/23  0512 05/13/23  0449 05/12/23  0332 05/11/23  0330   PLATELETS 10*3/mm3 284 259 228 234 192     COVID19   Date Value Ref Range Status   10/29/2022 Detected (C) Not Detected - Ref. Range Final     Lab Results "   Component Value Date    HGBA1C 6.70 (H) 11/28/2022          Medications            Scheduled Medications apixaban, 5 mg, Oral, BID  atorvastatin, 80 mg, Oral, Nightly  clopidogrel, 75 mg, Oral, Daily  famotidine, 20 mg, Oral, Daily  furosemide, 40 mg, Oral, Daily  hydrocortisone-bacitracin-zinc oxide-nystatin, 1 application, Topical, BID  latanoprost, 1 drop, Both Eyes, Nightly  metoprolol succinate XL, 25 mg, Oral, BID  Petrolatum, 1 application, Topical, Q24H  ruxolitinib, 15 mg, Oral, BID  saccharomyces boulardii, 250 mg, Oral, BID  sertraline, 100 mg, Oral, Daily  sodium bicarbonate, 650 mg, Oral, TID        Infusions      PRN Medications •  acetaminophen  •  loperamide  •  melatonin  •  nitroglycerin  •  ondansetron **OR** ondansetron  •  phenylephrine-mineral oil-petrolatum  •  potassium chloride **OR** potassium chloride **OR** potassium chloride  •  prochlorperazine  •  senna  •  sodium chloride     Physical Findings          Physical Appearance alert, hard of hearing, oriented, on oxygen therapy   Oral/Mouth Cavity tooth or teeth missing   Edema  no edema   Gastrointestinal non-distended , last bowel movement:5/15   Skin  bruising, excoriation, MASD, pressure injury - left medial foot (unstageable PI), abrasion (left leg, left lateral elbow)   Tubes/Drains/Lines none   NFPE Date Completed: 5/4   --  NUTRITION INTERVENTION / PLAN OF CARE  Intervention Goal         Intervention Goal(s) Nutrition support treatment, Meet estimated needs, Disease management/therapy, Tolerate PO , Continue positive trend and No significant weight loss     Nutrition Intervention         RD Action Supplement provided, Encourage intake, Follow Tx Progress, Care plan reviewed and Recommend/ordered:      Nutrition Prescription         Diet Prescription     Supplement Prescription Boost Breeze, TID   EN/PN Prescription    New Prescription Ordered? Continue same per protocol   --  Monitor/Evaluation        Monitor Per protocol, I&O,  PO intake, Supplement intake, Pertinent labs, Weight, Skin status, GI status, Symptoms   Discharge Needs Pending clinical course   Education Will instruct as appropriate   --    RD to follow up per protocol.    Electronically signed by:  Keri Baldwin RD  05/15/23 13:57 EDT

## 2023-05-15 NOTE — THERAPY TREATMENT NOTE
Patient Name: Catherine Boyer  : 1940    MRN: [id number]                              Today's Date: 5/15/2023       Admit Date: 5/3/2023    Visit Dx:     ICD-10-CM ICD-9-CM   1. Acute renal failure, unspecified acute renal failure type  N17.9 584.9   2. Hypokalemia  E87.6 276.8   3. Nausea vomiting and diarrhea  R11.2 787.91    R19.7 787.01   4. Weight loss  R63.4 783.21     Patient Active Problem List   Diagnosis   • CAD (coronary artery disease)   • Nonbacterial thrombotic endocarditis   • Paroxysmal atrial fibrillation   • Myeloproliferative disorder   • Microcytic anemia   • Type 2 diabetes mellitus with circulatory disorder, without long-term current use of insulin   • GERD (gastroesophageal reflux disease)   • Hypertension   • Personal history of transient ischemic attack (TIA), 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 deficits   • Porcelain gallbladder   • Breast cancer   • Acute on chronic diastolic heart failure   • Cholecystitis   • Urinary tract infection without hematuria, site unspecified   • Gastritis   • Atherosclerosis of abdominal aorta   • APC (atrial premature contractions)   • Moderate Malnutrition (HCC)   • 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     Past Medical History:   Diagnosis Date   • Atherosclerosis of abdominal aorta 2023   • Atrial fibrillation    • Breast cancer    • CAD (coronary artery disease)     NSTEMI 2022: 90% ostial LAD, 99% D1, 70% mid-distal LAD (medical therapy). She received two stents (2.5x18 and 2.5x26mm [place] LEFTY) but I don't know which one went to which lesion.   • Carotid atherosclerosis    • Chronic diastolic (congestive) heart failure    • COVID 10/29/2022   • GERD (gastroesophageal reflux disease)    • Glaucoma    • History of cataract    • Hypertension    • Microcytic anemia     per Dr. oleg pate office  note 22-dd   • Myeloproliferative disorder     JAK2 positive   • Nonbacterial thrombotic endocarditis     2021: 4x5mm vegetation on the ventricular  surface of the anterior MV, negative blood cultures   • Porcelain gallbladder    • PUD (peptic ulcer disease)    • TIA (transient ischemic attack)    • Type 2 diabetes mellitus    • Type 2 diabetes mellitus with circulatory disorder, without long-term current use of insulin 7/14/2022   • Upper GI bleed      Past Surgical History:   Procedure Laterality Date   • BREAST LUMPECTOMY  1999   • CARDIAC CATHETERIZATION N/A 09/02/2022    Procedure: Coronary angiography;  Surgeon: Guero Verde MD;  Location: [facility] CATH INVASIVE LOCATION;  Service: Cardiovascular;  Laterality: N/A;   • CARDIAC CATHETERIZATION N/A 09/02/2022    Procedure: Stent LEFTY coronary;  Surgeon: Guero Verde MD;  Location: [facility] CATH INVASIVE LOCATION;  Service: Cardiovascular;  Laterality: N/A;   • CATARACT EXTRACTION  2011   • CHOLECYSTECTOMY     • CHOLECYSTECTOMY WITH INTRAOPERATIVE CHOLANGIOGRAM N/A 11/28/2022    Procedure: CHOLECYSTECTOMY LAPAROSCOPIC INTRAOPERATIVE CHOLANGIOGRAM;  Surgeon: Dani Grover Jr., MD;  Location: [facility] MAIN OR;  Service: General;  Laterality: N/A;   • COLONOSCOPY N/A 02/09/2023    Procedure: COLONOSCOPY TO CECUM & T.I.;  Surgeon: Guero Ferris MD;  Location: [facility] ENDOSCOPY;  Service: Gastroenterology;  Laterality: N/A;  PRE- MELENA, GI BLEED  POST- DIVERTICULOSIS, INT HEMORRHOIDS   • ENDOSCOPY N/A 01/25/2023    Procedure: ESOPHAGOGASTRODUODENOSCOPY WITH BIOPSIES;  Surgeon: Charles Diaz MD;  Location: [facility] ENDOSCOPY;  Service: Gastroenterology;  Laterality: N/A;  pre: abd pain, nausea  post: hiatal hernia, mild gastritis, sloughing of the esophagus   • ENTEROSCOPY SMALL BOWEL N/A 02/09/2023    Procedure: ENTEROSCOPY SMALL BOWEL;  Surgeon: Guero Ferris MD;  Location: [facility] ENDOSCOPY;  Service: Gastroenterology;  Laterality: N/A;  PRE- MELENA, GI BLEED  POST- HIATAL HERNIA   • JOINT REPLACEMENT     • UPPER GASTROINTESTINAL ENDOSCOPY        General Information     Row Name 05/15/23 3716           Physical Therapy Time and Intention    Document Type therapy note (daily note)  -     Mode of Treatment physical therapy  -     Row Name 05/15/23 1520          General Information    Existing Precautions/Restrictions fall  -     Row Name 05/15/23 1520          Cognition    Orientation Status (Cognition) oriented x 4  -           User Key  (r) = Recorded By, (t) = Taken By, (c) = Cosigned By    Initials Name Provider Type     Sarah Su PTA Physical Therapist Assistant               Mobility     Row Name 05/15/23 1521          Bed Mobility    Bed Mobility supine-sit;sit-supine  -     Supine-Sit [place] (Bed Mobility) modified independence  -     Sit-Supine [place] (Bed Mobility) modified independence  -     Assistive Device (Bed Mobility) bed rails;head of bed elevated  -     Row Name 05/15/23 1521          Sit-Stand Transfer    Sit-Stand [place] (Transfers) standby assist  -     Row Name 05/15/23 1521          Gait/Stairs (Locomotion)    [place] Level (Gait) standby assist;contact guard  -     Assistive Device (Gait) walker, front-wheeled  -     Distance in Feet (Gait) 140  -     Deviations/Abnormal Patterns (Gait) jeanna decreased;stride length decreased  -     Bilateral Gait Deviations forward flexed posture  -SM     Comment, (Gait/Stairs) no LOBs noted  -           User Key  (r) = Recorded By, (t) = Taken By, (c) = Cosigned By    Initials Name Provider Type     Sarah Su PTA Physical Therapist Assistant               Obj/Interventions     Row Name 05/15/23 1523          Motor Skills    Therapeutic Exercise --  supine AP, heel slides, hip abd/add, SLRs x10 reps  -           User Key  (r) = Recorded By, (t) = Taken By, (c) = Cosigned By    Initials Name Provider Type    Sarah Donovan PTA Physical Therapist Assistant               Goals/Plan    No documentation.                Clinical Impression     Row Name 05/15/23 1523           Pain    Pretreatment Pain Rating 0/10 - no pain  -SM     Posttreatment Pain Rating 0/10 - no pain  -SM     Row Name 05/15/23 1523          Positioning and Restraints    Pre-Treatment Position in bed  -SM     Post Treatment Position bed  -SM     In Bed supine;call light within reach;encouraged to call for assist;exit alarm on;with family/caregiver;with nsg  -SM           User Key  (r) = Recorded By, (t) = Taken By, (c) = Cosigned By    Initials Name Provider Type    Sarah Donovan PTA Physical Therapist Assistant               Outcome Measures     Row Name 05/15/23 1524 05/15/23 0904       How much help from another person do you currently need...    Turning from your back to your side while in flat bed without using bedrails? 4  -SM 4  -LK    Moving from lying on back to sitting on the side of a flat bed without bedrails? 3  -SM 3  -LK    Moving to and from a bed to a chair (including a wheelchair)? 4  -SM 3  -LK    Standing up from a chair using your arms (e.g., wheelchair, bedside chair)? 4  -SM 3  -LK    Climbing 3-5 steps with a railing? 3  -SM 2  -LK    To walk in hospital room? 3  -SM 3  -LK    AM-PAC 6 Clicks Score (PT) 21  -SM 18  -LK    Highest level of mobility 6 --> Walked 10 steps or more  - 6 --> Walked 10 steps or more  -LK    Row Name 05/15/23 1524          Functional Assessment    Outcome Measure Options AM-PAC 6 Clicks Basic Mobility (PT)  -           User Key  (r) = Recorded By, (t) = Taken By, (c) = Cosigned By    Initials Name Provider Type    Sarah Donovan PTA Physical Therapist Assistant    Chen Dean, RN Registered Nurse                             Physical Therapy Education     Title: PT OT SLP Therapies (Done)     Topic: Physical Therapy (Done)     Point: Mobility training (Done)     Learning Progress Summary           Patient Acceptance, E,TB,D, VU,NR by  at 5/15/2023 1525    Acceptance, E,TB,D, VU,NR by  at 5/12/2023 1319    Acceptance, E, VU by  at  5/10/2023 1643    Acceptance, E,TB, VU,NR by ST at 5/9/2023 1029    Acceptance, E, VU by LW at 5/7/2023 1445                   Point: Home exercise program (Done)     Learning Progress Summary           Patient Acceptance, E,TB,D, VU,NR by  at 5/15/2023 1525    Acceptance, E,TB,D, VU,NR by  at 5/12/2023 1319    Acceptance, E, VU by DB at 5/10/2023 1643    Acceptance, E, VU by LW at 5/7/2023 1445                   Point: Body mechanics (Done)     Learning Progress Summary           Patient Acceptance, E,TB,D, VU,NR by  at 5/15/2023 1525    Acceptance, E,TB,D, VU,NR by  at 5/12/2023 1319    Acceptance, E, VU by DB at 5/10/2023 1643    Acceptance, E,TB, VU,NR by ST at 5/9/2023 1029    Acceptance, E, VU by LW at 5/7/2023 1445                   Point: Precautions (Done)     Learning Progress Summary           Patient Acceptance, E,TB,D, VU,NR by  at 5/15/2023 1525    Acceptance, E,TB,D, VU,NR by  at 5/12/2023 1319    Acceptance, E, VU by DB at 5/10/2023 1643    Acceptance, E, VU by LW at 5/7/2023 1445                               User Key     Initials Effective Dates Name Provider Type Discipline     03/07/18 -  Sarah Su, PTA Physical Therapist Assistant PT    DB 06/16/21 -  Kathrny Zimmerman, PT Physical Therapist PT    LW 06/10/22 -  rBi Reynolds, PT Physical Therapist PT    ST 09/22/22 -  Tiana Pressley, PT Physical Therapist PT              PT Recommendation and Plan     Plan of Care Reviewed With: patient  Progress: improving  Outcome Evaluation: Pt tolerated treatment well this date. Increased gait distance to 140ft w/ Rw and SBA-CGA. No LOBs noted, and pt ambulated w/out O2 w/ no c/o SOA. Pt also completed a few supine LE exercises, and was encouraged to ambulate w/ nsg later.     Time Calculation:    PT Charges     Row Name 05/15/23 1529             Time Calculation    Start Time 1418  -SM      Stop Time 1447  -SM      Time Calculation (min) 29 min  -SM      PT Received On 05/15/23   -      PT - Next Appointment 05/16/23  -            User Key  (r) = Recorded By, (t) = Taken By, (c) = Cosigned By    Initials Name Provider Type    Sarah Donovan PTA Physical Therapist Assistant              Therapy Charges for Today     Code Description Service Date Service Provider Modifiers Qty    35952503623 HC PT THER PROC EA 15 MIN 5/15/2023 Sarah Su, CHERYL GP 1    62079875523 HC GAIT TRAINING EA 15 MIN 5/15/2023 Sarah Su PTA GP 1          PT G-Codes  Outcome Measure Options: AM-PAC 6 Clicks Basic Mobility (PT)  AM-PAC 6 Clicks Score (PT): 21  PT Discharge Summary  Anticipated Discharge Disposition (PT): home with home health, skilled nursing facility    Sarah Su PTA  5/15/2023

## 2023-05-16 ENCOUNTER — READMISSION MANAGEMENT (OUTPATIENT)
Dept: CALL CENTER | Facility: HOSPITAL | Age: 83
End: 2023-05-16
Payer: MEDICARE

## 2023-05-16 VITALS
BODY MASS INDEX: 25.31 KG/M2 | TEMPERATURE: 97.8 F | OXYGEN SATURATION: 96 % | RESPIRATION RATE: 18 BRPM | SYSTOLIC BLOOD PRESSURE: 134 MMHG | DIASTOLIC BLOOD PRESSURE: 77 MMHG | HEIGHT: 61 IN | WEIGHT: 134.04 LBS | HEART RATE: 78 BPM

## 2023-05-16 LAB
BASOPHILS # BLD MANUAL: 0.87 10*3/MM3 (ref 0–0.2)
BASOPHILS NFR BLD MANUAL: 2 % (ref 0–1.5)
DEPRECATED RDW RBC AUTO: 58.3 FL (ref 37–54)
ERYTHROCYTE [DISTWIDTH] IN BLOOD BY AUTOMATED COUNT: 17.4 % (ref 12.3–15.4)
HCT VFR BLD AUTO: 25.7 % (ref 34–46.6)
HGB BLD-MCNC: 8.3 G/DL (ref 12–15.9)
LYMPHOCYTES # BLD MANUAL: 3.09 10*3/MM3 (ref 0.7–3.1)
LYMPHOCYTES NFR BLD MANUAL: 8.1 % (ref 5–12)
MCH RBC QN AUTO: 30.3 PG (ref 26.6–33)
MCHC RBC AUTO-ENTMCNC: 32.3 G/DL (ref 31.5–35.7)
MCV RBC AUTO: 93.8 FL (ref 79–97)
METAMYELOCYTES NFR BLD MANUAL: 1 % (ref 0–0)
MONOCYTES # BLD: 3.53 10*3/MM3 (ref 0.1–0.9)
MYELOCYTES NFR BLD MANUAL: 1 % (ref 0–0)
NEUTROPHILS # BLD AUTO: 35.22 10*3/MM3 (ref 1.7–7)
NEUTROPHILS NFR BLD MANUAL: 80.8 % (ref 42.7–76)
PLAT MORPH BLD: NORMAL
PLATELET # BLD AUTO: 324 10*3/MM3 (ref 140–450)
PMV BLD AUTO: 11.4 FL (ref 6–12)
RBC # BLD AUTO: 2.74 10*6/MM3 (ref 3.77–5.28)
RBC MORPH BLD: NORMAL
VARIANT LYMPHS NFR BLD MANUAL: 7.1 % (ref 19.6–45.3)
WBC MORPH BLD: NORMAL
WBC NRBC COR # BLD: 43.59 10*3/MM3 (ref 3.4–10.8)

## 2023-05-16 PROCEDURE — 85025 COMPLETE CBC W/AUTO DIFF WBC: CPT | Performed by: INTERNAL MEDICINE

## 2023-05-16 PROCEDURE — 63710000001 RUXOLITINIB 15 MG TABLET: Performed by: INTERNAL MEDICINE

## 2023-05-16 PROCEDURE — C9399 UNCLASSIFIED DRUGS OR BIOLOG: HCPCS | Performed by: INTERNAL MEDICINE

## 2023-05-16 PROCEDURE — 85007 BL SMEAR W/DIFF WBC COUNT: CPT | Performed by: INTERNAL MEDICINE

## 2023-05-16 RX ORDER — FAMOTIDINE 20 MG/1
20 TABLET, FILM COATED ORAL DAILY
Start: 2023-05-16

## 2023-05-16 RX ADMIN — SODIUM BICARBONATE 650 MG: 650 TABLET ORAL at 08:59

## 2023-05-16 RX ADMIN — PETROLATUM 1 APPLICATION: 420 OINTMENT TOPICAL at 08:59

## 2023-05-16 RX ADMIN — FAMOTIDINE 20 MG: 20 TABLET, FILM COATED ORAL at 08:59

## 2023-05-16 RX ADMIN — SERTRALINE 100 MG: 100 TABLET, FILM COATED ORAL at 08:59

## 2023-05-16 RX ADMIN — APIXABAN 5 MG: 5 TABLET, FILM COATED ORAL at 08:59

## 2023-05-16 RX ADMIN — FUROSEMIDE 40 MG: 40 TABLET ORAL at 08:59

## 2023-05-16 RX ADMIN — RUXOLITINIB 15 MG: 15 TABLET ORAL at 09:12

## 2023-05-16 RX ADMIN — Medication 250 MG: at 08:59

## 2023-05-16 RX ADMIN — ZINC OXIDE 1 APPLICATION: 200 OINTMENT TOPICAL at 08:59

## 2023-05-16 RX ADMIN — CLOPIDOGREL BISULFATE 75 MG: 75 TABLET, FILM COATED ORAL at 08:59

## 2023-05-16 RX ADMIN — METOPROLOL SUCCINATE 25 MG: 25 TABLET, EXTENDED RELEASE ORAL at 08:59

## 2023-05-16 NOTE — PAYOR COMM NOTE
"Maribell Boyer (83 y.o. Female)       RE: [id number]    DC TO NURSING FACILITY 5/16        Date of Birth   1940    Social Security Number       Address   [address] [place] [address] [address] [place] [address]    Home Phone   [phone]    MRN   [id number]       Gnosticist   Unknown    Marital Status                               Admission Date   5/3/23    Admission Type   Emergency    Admitting Provider   Jaimee Macias MD    Attending Provider       Department, Room/Bed   [address], E463/1       Discharge Date   5/16/2023    Discharge Disposition   Home-Health Care [facility]    Discharge Destination                               Attending Provider: (none)   Allergies: Aspirin, Oxycodone, Hydroxyurea, Diphenhydramine Hcl    Isolation: Spore, Contact   Infection: C.difficile (05/03/23), Rotavirus (05/03/23)   Code Status: No CPR    Ht: 154 cm (60.63\")   Wt: 60.8 kg (134 lb 0.6 oz)    Admission Cmt: None   Principal Problem: Rotavirus enteritis [A08.0]                 Active Insurance as of 5/3/2023     Primary Coverage     Payor Plan Insurance Group Employer/Plan Group    AETNA MEDICARE REPLACEMENT AETNA MEDICARE REPLACEMENT [id number]     Payor Plan Address Payor Plan Phone Number Payor Plan Fax Number Effective Dates    [address] [phone]  1/1/2022 - None Entered    [facility] [id number]       Subscriber Name Subscriber Birth Date Member ID       MARIBELL BOYER 1940 [id number]                 Emergency Contacts      (Rel.) Home Phone Work Phone Mobile Phone    RAY BOYER (Daughter) [phone] -- [phone]    Karan Boyer POA/[facility] (Son) -- -- [phone]    Karen Gutierrez (Daughter) -- -- [phone]    Greg Boyer (Son) -- -- [phone]    Meg Boyer (Brother) [phone] -- --    MEG BANKS (Brother) -- -- [phone]               Discharge Summary      Kenton Meeks MD at 05/12/23 1508      "     Date of Admission: 5/3/2023  Date of Discharge:   Primary Care Physician: Kristi Moreau APRN     Discharge Diagnosis:  Active Hospital Problems    Diagnosis  POA   • **Rotavirus enteritis [A08.0]  Yes   • Moderate Malnutrition (HCC) [E44.0]  Yes   • 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 [Z22.1]  Not Applicable   • KARLI (acute kidney injury) [N17.9]  Yes   • Acute renal failure, unspecified acute renal failure type [N17.9]  Yes   • Epigastric abdominal pain [R10.13]  Yes   • Acute on chronic diastolic heart failure [I50.33]  No   • Hypertension [I10]  Yes   • CAD (coronary artery disease) [I25.10]  Yes   • Myeloproliferative disorder [D47.1]  Yes   • Paroxysmal atrial fibrillation [I48.0]  Yes   • Type 2 diabetes mellitus with circulatory disorder, 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       Presenting Problem/History of Present Illness:  Hypokalemia [E87.6]  Weight loss [R63.4]  Nausea vomiting and diarrhea [R11.2, R19.7]  Acute renal failure, unspecified acute renal failure type [N17.9]     Hospital Course:  The patient is a 83 y.o. female with a history of HTN, HLD, CAD, Myeloproliferative disorder, PAF, Type 2 DM, chronic diastolic CHF, chronic hypoxic respiratory failure on 3L NC at home with activity and at night, and chronic diarrhea who presented with vomiting and diarrhea. Please see admission H&P for further details. She was found to have rotavirus enteritis and was given supportive care. She was found to be colonized with cdiff but no active infection was evident. She was started on cholestyramine and prn imodium and her symptoms slowly improved. Her blood counts remained within her previously known baseline. She did have an episode of acute on chronic CHF and this responded well to lasix. She had some chest discomfort as well and was evaluated by cardiology. She had no evidence of ACS and her pain resolved.   She is medically  "stable and is doing much better with physical therapy. She will be discharged back to assisted living and will need to keep close follow up with her PCP. She is also scheduled to follow up with GI as they are considering a liver biopsy at some point for chronically elevated alkaline phosphatase and weight loss when her acute issues resolve.     Exam Today:  Blood pressure 134/66, pulse 82, temperature 98.3 °F (36.8 °C), temperature source Oral, resp. rate 18, height 154 cm (60.63\"), weight 60.6 kg (133 lb 9.6 oz), SpO2 91 %.  Vitals and nursing note reviewed.   Constitutional:       General: She is not in acute distress.     Appearance: She is not toxic-appearing or diaphoretic.   HENT:      Head: Normocephalic and atraumatic.      Nose: Nose normal.      Mouth/Throat:      Mouth: Mucous membranes are moist.      Pharynx: Oropharynx is clear.   Eyes:      Conjunctiva/sclera: Conjunctivae normal.      Pupils: Pupils are equal, round, and reactive to light.   Cardiovascular:      Rate and Rhythm: Normal rate and regular rhythm.      Pulses: Normal pulses.   Pulmonary:      Effort: Pulmonary effort is normal.      Breath sounds: Normal breath sounds.   Abdominal:      General: Bowel sounds are normal.      Palpations: Abdomen is soft.      Tenderness: There is no abdominal tenderness.   Musculoskeletal:         General: No swelling or tenderness.      Cervical back: Normal range of motion and neck supple.   Skin:     General: Skin is warm and dry.      Capillary Refill: Capillary refill takes less than 2 seconds.   Neurological:      General: No focal deficit present.      Mental Status: She is alert.   Psychiatric:         Mood and Affect: Mood normal.         Behavior: Behavior normal.     Procedures Performed:      Consults:   Consults     Date and Time Order Name Status Description    5/5/2023 12:23 PM Inpatient Cardiology Consult Completed     5/3/2023  4:10 PM [facility] (on-call MD unless specified) Details           "   Discharge Disposition:  Home or Self Care    Discharge Medications:     Discharge Medications      New Medications      Instructions Start Date   cholestyramine 4 g packet  Commonly known as: QUESTRAN   1 packet, Oral, Every 12 Hours Scheduled      loperamide 2 MG capsule  Commonly known as: IMODIUM   2 mg, Oral, 4 Times Daily PRN      saccharomyces boulardii 250 MG capsule  Commonly known as: FLORASTOR   250 mg, Oral, 2 Times Daily      sodium bicarbonate 650 MG tablet   650 mg, Oral, 3 Times Daily         Continue These Medications      Instructions Start Date   acetaminophen 500 MG tablet  Commonly known as: TYLENOL   500 mg, Oral, As Needed      apixaban 5 MG tablet tablet  Commonly known as: ELIQUIS   5 mg, Oral, 2 Times Daily      atorvastatin 80 MG tablet  Commonly known as: LIPITOR   80 mg, Oral, Nightly      clopidogrel 75 MG tablet  Commonly known as: PLAVIX   TAKE 1 TABLET DAILY      empagliflozin 10 MG tablet tablet  Commonly known as: JARDIANCE   10 mg, Oral, Daily      esomeprazole 40 MG capsule  Commonly known as: nexIUM   40 mg, Oral, Every Morning Before Breakfast      furosemide 40 MG tablet  Commonly known as: LASIX   40 mg, Oral, Daily      Gas Relief 80 MG chewable tablet  Generic drug: simethicone   Chew 1 tablet by mouth 4 (Four) Times a Day As Needed for Flatulence.      Jakafi 15 MG tablet  Generic drug: ruxolitinib   15 mg, Oral, 2 Times Daily      latanoprost 0.005 % ophthalmic solution  Commonly known as: XALATAN   1 drop, Both Eyes      metoprolol succinate XL 25 MG 24 hr tablet  Commonly known as: TOPROL-XL   25 mg, Oral, 2 Times Daily      ondansetron 4 MG tablet  Commonly known as: Zofran   4 mg, Oral, Every 8 Hours PRN      sertraline 100 MG tablet  Commonly known as: ZOLOFT   100 mg, Oral, Daily      sucralfate 1 g tablet  Commonly known as: CARAFATE   1 g, Oral, 4 Times Daily      traZODone 50 MG tablet  Commonly known as: DESYREL   25-50 mg, Oral, Every Night at Bedtime          Stop These Medications    lisinopril 2.5 MG tablet  Commonly known as: PRINIVIL,ZESTRIL     pantoprazole 40 MG EC tablet  Commonly known as: PROTONIX            Discharge Diet:   Diet Instructions     Diet: Gastrointestinal Diets; Low Irritant; Regular Texture (IDDSI 7); Thin (IDDSI 0)      Discharge Diet: Gastrointestinal Diets    Gastrointestinal Diet: Low Irritant    Texture: Regular Texture (IDDSI 7)    Fluid Consistency: Thin (IDDSI 0)          Activity at Discharge:   Activity Instructions     Activity as Tolerated            Follow-up Appointments:  Future Appointments   Date Time Provider Department Center   8/2/2023  9:00 AM Albin Barriga MD [facility]     Additional Instructions for the Follow-ups that You Need to Schedule     Discharge Follow-up with PCP   As directed       Currently Documented PCP:    Kristi Moreau APRN    PCP Phone Number:    [phone]     Follow Up Details: 1 week                Kenton Meeks MD  05/12/23  15:15 EDT    Time Spent on Discharge Activities: Greater than 30 minutes.          Electronically signed by Kenton Meeks MD at 05/12/23 3410

## 2023-05-16 NOTE — PLAN OF CARE
Goal Outcome Evaluation:  Plan of Care Reviewed With: patient        Progress: improving  Outcome Evaluation: No changes last night, vss, pleasant and slept well. Possible d/c today.

## 2023-05-16 NOTE — CASE MANAGEMENT/SOCIAL WORK
Case Management Discharge Note      Final Note: Return to [facility] with [facility]. Family did not want to wait for the decision on their Expedited Appeal. Transport by family    Provided Post Acute Provider List?: Yes  Post Acute Provider List: Nursing Home  Provided Post Acute Provider Quality & Resource List?: Yes  Post Acute Provider Quality and Resource List: Nursing Home  Delivered To: Support Person  Support Person: Jeffry Thorpe  Method of Delivery: In person    Selected Continued Care - Discharged on 5/16/2023 Admission date: 5/3/2023 - Discharge disposition: Home-Health Care Svc    Destination    No services have been selected for the patient.              Durable Medical Equipment    No services have been selected for the patient.              Dialysis/Infusion    No services have been selected for the patient.              Home Medical Care Coordination complete.    Service Provider Selected Services Address Phone Fax Patient Preferred    [facility] [facility], [address], [facility] [address] [phone] --          Therapy    No services have been selected for the patient.              Community Resources    No services have been selected for the patient.              Community & DME    No services have been selected for the patient.                Selected Continued Care - Episodes Includes continued care and service providers with selected services from the active episodes listed below    Oncology Episode start date: 5/18/2022   There are no active outsourced providers for this episode.               Transportation Services  Private: Car    Final Discharge Disposition Code: 06 - home with home health care

## 2023-05-16 NOTE — DISCHARGE SUMMARY
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]  Not Applicable   • Acute on chronic diastolic heart failure [I50.33]  No   • Hypertension [I10]  Yes   • CAD (coronary artery disease) [I25.10]  Yes   • Myeloproliferative disorder [D47.1]  Yes   • Paroxysmal atrial fibrillation [I48.0]  Yes   • Type 2 diabetes mellitus with circulatory disorder, without long-term current use of insulin [E11.59]  Yes      Resolved Hospital Problems    Diagnosis Date Resolved POA   • **Rotavirus enteritis [A08.0] 05/14/2023 Yes   • KARLI (acute kidney injury) [N17.9] 05/14/2023 Yes   • Acute renal failure, unspecified acute renal failure type [N17.9] 05/14/2023 Yes   • Epigastric abdominal pain [R10.13] 05/14/2023 Yes       Presenting Problem/History of Present Illness:  Hypokalemia [E87.6]  Weight loss [R63.4]  Nausea vomiting and diarrhea [R11.2, R19.7]  Acute renal failure, unspecified acute renal failure type [N17.9]     Hospital Course through 5/12 dictated by Dr. Meeks:  The patient is a 83 y.o. female with a history of HTN, HLD, CAD, Myeloproliferative disorder, PAF, Type 2 DM, chronic diastolic CHF, chronic hypoxic respiratory failure on 3L NC at home with activity and at night, and chronic diarrhea who presented with vomiting and diarrhea. Please see admission H&P for further details. She was found to have rotavirus enteritis and was given supportive care. She was found to be colonized with cdiff but no active infection was evident. She was started on cholestyramine and prn imodium and her symptoms slowly improved. Her blood counts remained within her previously known baseline. She did have an episode of acute on chronic CHF and this responded well to lasix. She had some chest discomfort as well and was evaluated by  "cardiology. She had no evidence of ACS and her pain resolved.   She is medically stable and is doing much better with physical therapy. She will be discharged back to assisted living and will need to keep close follow up with her PCP. She is also scheduled to follow up with GI as they are considering a liver biopsy at some point for chronically elevated alkaline phosphatase and weight loss when her acute issues resolve.     Hospital course from 5/13 through 5/16:  I have been following the patient since 5/13 while she awaited decision on her appeal.  Family had been trying to discharge her to skilled nursing facility and appeal decision by her insurer not to pay for this.  She did not have any adverse events over the weekend and in fact was very stable.  She is doing well and has no complaints.  Family has ultimately decided to take her back home and her appeal was denied today.  I reviewed her home medication list this morning and adjusted as below.      Exam Today:  Blood pressure 134/77, pulse 78, temperature 97.8 °F (36.6 °C), temperature source Oral, resp. rate 18, height 154 cm (60.63\"), weight 60.8 kg (134 lb 0.6 oz), SpO2 96 %.  Vitals and nursing note reviewed.   Constitutional:       General: She is not in acute distress.     Appearance: She is not toxic-appearing or diaphoretic.   HENT:      Head: Normocephalic and atraumatic.      Nose: Nose normal.   Eyes:      Conjunctiva/sclera: Conjunctivae normal.      Pupils: Pupils are equal, round, and reactive to light.   Cardiovascular:      Rate and Rhythm: Normal rate and regular rhythm.      Pulses: Normal pulses.   Pulmonary:      Effort: Pulmonary effort is normal.      Breath sounds: Normal breath sounds.   Abdominal:      General: Bowel sounds are normal.      Palpations: Abdomen is soft.      Tenderness: There is no abdominal tenderness.   Musculoskeletal:         General: No swelling or tenderness.      General: Skin is warm and dry.   Neurological:    "   General: No focal deficit present.      Mental Status: She is alert.   Psychiatric:         Mood and Affect: Mood normal.         Behavior: Behavior normal.     Procedures Performed:      Consults:   Consults     Date and Time Order Name Status Description    5/5/2023 12:23 PM Inpatient Cardiology Consult Completed            [place] [facility] [place]charge Medications:     Discharge Medications      New Medications      Instructions Start Date   famotidine 20 MG tablet  Commonly known as: PEPCID   20 mg, Oral, Daily      hydrocortisone-bacitracin-zinc oxide-nystatin  Commonly known as: MAGIC BARRIER   1 application, Topical, 2 Times Daily      loperamide 2 MG capsule  Commonly known as: IMODIUM   2 mg, Oral, 4 Times Daily PRN      saccharomyces boulardii 250 MG capsule  Commonly known as: FLORASTOR   250 mg, Oral, 2 Times Daily      sodium bicarbonate 650 MG tablet   650 mg, Oral, 3 Times Daily         Continue These Medications      Instructions Start Date   acetaminophen 500 MG tablet  Commonly known as: TYLENOL   500 mg, Oral, As Needed      apixaban 5 MG tablet tablet  Commonly known as: ELIQUIS   5 mg, Oral, 2 Times Daily      atorvastatin 80 MG tablet  Commonly known as: LIPITOR   80 mg, Oral, Nightly      clopidogrel 75 MG tablet  Commonly known as: PLAVIX   TAKE 1 TABLET DAILY      furosemide 40 MG tablet  Commonly known as: LASIX   40 mg, Oral, Daily      Gas Relief 80 MG chewable tablet  Generic drug: simethicone   Chew 1 tablet by mouth 4 (Four) Times a Day As Needed for Flatulence.      Jakafi 15 MG tablet  Generic drug: ruxolitinib   15 mg, Oral, 2 Times Daily      latanoprost 0.005 % ophthalmic solution  Commonly known as: XALATAN   1 drop, Both Eyes      lisinopril 2.5 MG tablet  Commonly known as: PRINIVIL,ZESTRIL   2.5 mg, Oral, Daily      metoprolol succinate XL 25 MG 24 hr tablet  Commonly known as: TOPROL-XL   25 mg, Oral, 2 Times Daily      ondansetron 4 MG  tablet  Commonly known as: Zofran   4 mg, Oral, Every 8 Hours PRN      sertraline 100 MG tablet  Commonly known as: ZOLOFT   100 mg, Oral, Daily      sucralfate 1 g tablet  Commonly known as: CARAFATE   1 g, Oral, 4 Times Daily      traZODone 50 MG tablet  Commonly known as: DESYREL   25-50 mg, Oral, Every Night at Bedtime         Stop These Medications    empagliflozin 10 MG tablet tablet  Commonly known as: JARDIANCE     esomeprazole 40 MG capsule  Commonly known as: nexIUM     pantoprazole 40 MG EC tablet  Commonly known as: PROTONIX            Discharge Diet:   Diet Instructions     Diet: Gastrointestinal Diets; Low Irritant; Regular Texture (IDDSI 7); Thin (IDDSI 0)      Discharge Diet: Gastrointestinal Diets    Gastrointestinal Diet: Low Irritant    Texture: Regular Texture (IDDSI 7)    Fluid Consistency: Thin (IDDSI 0)          Activity at Discharge:   Activity Instructions     Activity as Tolerated            Follow-up Appointments:  Future Appointments   Date Time Provider Department [place]   8/2/2023  9:00 AM Ablin Barriga MD [facility]     Additional Instructions for the Follow-ups that You Need to Schedule     Discharge Follow-up with PCP   As directed       Currently Documented PCP:    Kristi Moreau APRN    PCP Phone Number:    [phone]     Follow Up Details: 1 week                Kenton Lopez MD  05/16/23  16:10 EDT    Time Spent on Discharge Activities: Greater than 30 minutes.

## 2023-05-17 NOTE — OUTREACH NOTE
Prep Survey    Flowsheet Row Responses   [facility] facility patient discharged from? [place]   Is LACE score < 7 ? No   Eligibility Readm Mgmt   Discharge diagnosis Moderate MalnutritionAcute on chronic diastolic heart failure   Does the patient have one of the following disease processes/diagnoses(primary or secondary)? CHF   Does the patient have Home health ordered? Yes   What is the Home health agency?  [facility]   Is there a DME ordered? No   Prep survey completed? Yes          TESSY GARIBAY - Registered Nurse

## 2023-05-19 ENCOUNTER — READMISSION MANAGEMENT (OUTPATIENT)
Dept: CALL CENTER | Facility: HOSPITAL | Age: 83
End: 2023-05-19
Payer: MEDICARE

## 2023-05-19 NOTE — OUTREACH NOTE
CHF Week 1 Survey    Flowsheet Row Responses   [facility] facility patient discharged from? [place]   Does the patient have one of the following disease processes/diagnoses(primary or secondary)? CHF   CHF Week 1 attempt successful? No   Unsuccessful attempts Attempt 1   Discharge diagnosis Moderate MalnutritionAcute on chronic diastolic heart failure          SANDEE ENNIS - Registered Nurse

## 2023-05-23 ENCOUNTER — READMISSION MANAGEMENT (OUTPATIENT)
Dept: CALL CENTER | Facility: HOSPITAL | Age: 83
End: 2023-05-23
Payer: MEDICARE

## 2023-05-23 ENCOUNTER — SPECIALTY PHARMACY (OUTPATIENT)
Dept: PHARMACY | Facility: HOSPITAL | Age: 83
End: 2023-05-23
Payer: MEDICARE

## 2023-05-23 ENCOUNTER — TELEPHONE (OUTPATIENT)
Dept: ONCOLOGY | Facility: CLINIC | Age: 83
End: 2023-05-23
Payer: MEDICARE

## 2023-05-23 NOTE — TELEPHONE ENCOUNTER
----- Message from Nicki Sorensen RN sent at 5/23/2023  1:27 PM EDT -----  Regarding: RE: ALEXEY  Next available  ?  ----- Message -----  From: Lori Guajardo  Sent: 5/23/2023   1:23 PM EDT  To: Nicki Sorensen RN  Subject: RE: ALEXEY                                         When does this patient need to be seen?  ----- Message -----  From: Nicki Sorensen RN  Sent: 5/23/2023   1:18 PM EDT  To: [facility]  Subject: ALEXEY                                             Please schedule with Dr. Reinoso 2 units with lab hospital return. Thanks.

## 2023-05-23 NOTE — TELEPHONE ENCOUNTER
Caller: Karen Gutierrez    Relationship to patient: Emergency Contact    Best call back number: [phone]    Chief complaint: PATIENT NEEDS AFTER 10AM    Type of visit: LAB AND FOLLOW UP    Requested date: NEEDS AFTER 10AM     If rescheduling, when is the original appointment: 5-25-23     Additional notes:PLEASE CALL TO RESCHEDULE, HUB TRIED TO RESCHEDULE BUT NEXT AVAILABLE WAS MIDDLE OF June. NOT SURE IF DR BLACKBURN WANTED PATIENT TO WAIT THAT LONG. PATIENT CAN NOT DO 5-25-23, PLEASE CANCEL

## 2023-05-23 NOTE — OUTREACH NOTE
CHF Week 1 Survey    Flowsheet Row Responses   [facility] facility patient discharged from? [place]   Does the patient have one of the following disease processes/diagnoses(primary or secondary)? CHF   CHF Week 1 attempt successful? No   Unsuccessful attempts Attempt 2          Mirna MEZA - Registered Nurse

## 2023-05-23 NOTE — PROGRESS NOTES
Specialty Pharmacy Refill Coordination Note     Catherine is a 83 y.o. female contacted today regarding refills of Jakafi specialty medication(s).    Reviewed and verified with patient:       Specialty medication(s) and dose(s) confirmed: yes  Jakafi 15 mg twice per day      Refill Questions    Flowsheet Row Most Recent Value   Changes to allergies? No   Changes to medications? No   New conditions since last clinic visit Yes  [Per Karen-Pt contracted a Virus and had kidney failure]   Unplanned office visit, urgent care, ED, or hospital admission in the last 4 weeks  Yes  [Pt recently admitted to hospital]   How does patient/caregiver feel medication is working? Good   Financial problems or insurance changes  No   Since the previous refill, were any specialty medication doses or scheduled injections missed or delayed?  No  [Pt continued Jakafi through Hospital stay]   Does this patient require a clinical escalation to a pharmacist? No          Delivery Questions    Flowsheet Row Most Recent Value   Delivery method Other (Comment)  [Ship to home address-Ship 5/25 for delivery 5/26-$0 copay with [employer]-Address Confirmed]   Delivery address correct? Yes  [Ship to home address]   Delivery phone number [phone]   Preferred delivery time? AM   Number of medications in delivery 1   Medication being filled and delivered Jakafi   Doses left of specialty medications Karen states 5-7 days   Is there any medication that is due not being filled? No   Supplies needed? No supplies needed   Cooler needed? No   Do any medications need mixed or dated? No   Copay form of payment Payment plan already set up   Additional comments $0 copay with [employer]   Questions or concerns for the pharmacist? No   Explain any questions or concerns for the pharmacist N/A   Are any medications first time fills? No        Jakafi delivery coordinated with Karen for 5/26/2023 to pts home address. $0 copay with [employer].  Her last delivery was on 4/25/2023. No questions or concerns to report to MTM Team today.     Follow-up: 21 day(s)     Isi Mao  Specialty Pharmacy Technician

## 2023-06-05 ENCOUNTER — READMISSION MANAGEMENT (OUTPATIENT)
Dept: CALL CENTER | Facility: HOSPITAL | Age: 83
End: 2023-06-05
Payer: MEDICARE

## 2023-06-05 NOTE — OUTREACH NOTE
CHF Week 3 Survey      Flowsheet Row Responses   [facility] patient discharged from? [place]   Does the patient have one of the following disease processes/diagnoses(primary or secondary)? CHF   Week 3 attempt successful? Yes   Call start time 1052   Call end time 1055   Discharge diagnosis Moderate MalnutritionAcute on chronic diastolic heart failure   Meds reviewed with patient/caregiver? Yes   Does the patient have an appointment with their PCP within 7 days of discharge? Greater than 7 days   What is preventing the patient from scheduling follow up appointments within 7 days of discharge? Earlier appointment not available   Has the patient kept scheduled appointments due by today? N/A   Comments Pt states that she feels better than she has in a long time.Denies SOA or edema.   What is the patient's perception of their health status since discharge? Returned to baseline/stable   CHF Zone this Call Green Zone   Green Zone Patient reports doing well, No new or worsening shortness of breath, Physical activity level is normal for you   CHF Week 3 call completed? Yes   Graduated Yes   Is the patient interested in additional calls from an ambulatory ?  NOTE:  applies to high risk patients requiring additional follow-up. No   Call end time 1055            Bailey ESQUEDA - Registered Nurse

## 2023-06-19 ENCOUNTER — SPECIALTY PHARMACY (OUTPATIENT)
Dept: PHARMACY | Facility: HOSPITAL | Age: 83
End: 2023-06-19
Payer: MEDICARE

## 2023-06-19 DIAGNOSIS — D75.81 MYELOFIBROSIS: ICD-10-CM

## 2023-06-19 RX ORDER — RUXOLITINIB 15 MG/1
15 TABLET ORAL 2 TIMES DAILY
Qty: 60 TABLET | Refills: 3 | Status: SHIPPED | OUTPATIENT
Start: 2023-06-19

## 2023-06-19 NOTE — PROGRESS NOTES
Re: Refills of Oral Specialty Medication - Jakafi (ruxolitinib)    • Drug-Drug Interactions: The current medication list was reviewed and there are no relevant drug-drug interactions.  • Medication Allergies: The patient has no relevant allergies as it relates to their oral specialty medication  • Review of Labs/Dose Adjustments: NO DOSE CHANGE - I reviewed the most recent note and labs and the patient will continue without any dose changes.  I sent refills as described below.    Drug: Jakafi (ruxolitinib)  Strength: 15 mg  Directions: Take one tablet by mouth twice a day  Quantity: 60  Refills: 3  Pharmacy prescription sent to:  [facility]    Name/Credentials: Lidia Frazier, Mae, BCPS    6/19/2023  15:15 EDT     Completed independent double check on medication order/RX.  Kay Guerrero, RP, BCOP

## 2023-08-02 ENCOUNTER — OFFICE VISIT (OUTPATIENT)
Dept: CARDIOLOGY | Facility: CLINIC | Age: 83
End: 2023-08-02
Payer: MEDICARE

## 2023-08-02 VITALS
HEART RATE: 102 BPM | BODY MASS INDEX: 27.94 KG/M2 | DIASTOLIC BLOOD PRESSURE: 60 MMHG | HEIGHT: 61 IN | WEIGHT: 148 LBS | SYSTOLIC BLOOD PRESSURE: 118 MMHG

## 2023-08-02 DIAGNOSIS — I25.10 CORONARY ARTERY DISEASE INVOLVING NATIVE CORONARY ARTERY OF NATIVE HEART WITHOUT ANGINA PECTORIS: Primary | ICD-10-CM

## 2023-08-02 DIAGNOSIS — I38 NONBACTERIAL THROMBOTIC ENDOCARDITIS: ICD-10-CM

## 2023-08-02 DIAGNOSIS — I10 PRIMARY HYPERTENSION: ICD-10-CM

## 2023-08-02 DIAGNOSIS — I50.32 CHRONIC DIASTOLIC HEART FAILURE: ICD-10-CM

## 2023-08-02 DIAGNOSIS — I49.1 APC (ATRIAL PREMATURE CONTRACTIONS): ICD-10-CM

## 2023-08-02 DIAGNOSIS — I48.0 PAROXYSMAL ATRIAL FIBRILLATION: ICD-10-CM

## 2023-08-02 PROBLEM — N39.0 URINARY TRACT INFECTION WITHOUT HEMATURIA, SITE UNSPECIFIED: Status: RESOLVED | Noted: 2023-01-23 | Resolved: 2023-08-02

## 2023-08-02 PROBLEM — K81.9 CHOLECYSTITIS: Status: RESOLVED | Noted: 2022-11-22 | Resolved: 2023-08-02

## 2023-08-04 RX ORDER — EMPAGLIFLOZIN 10 MG/1
TABLET, FILM COATED ORAL
Qty: 90 TABLET | Refills: 3 | Status: SHIPPED | OUTPATIENT
Start: 2023-08-04

## 2023-08-04 NOTE — PROGRESS NOTES
"[facility] OUTPATIENT FOLLOW UP CLINIC VISIT    REASON FOR FOLLOW-UP:    Myelofibrosis, on Jakafi    HISTORY OF PRESENT ILLNESS:  Catherine Boyer is a 83 y.o. female with the above-mentioned she returns today for follow-up.    Jakafi 15 mg twice daily continues.    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  She had a Mohs procedure done last Friday on the left lower extremity.  Following this, she did have some bleeding which the dermatologist was then able to stop.  She is off of her Eliquis and doing well.  She has a skin graft on the proximal left upper extremity with some bruising.  There is a Mohs procedure planned on her chest for this coming Friday.  Otherwise she is doing well.    REVIEW OF SYSTEMS:  As per HPI    PHYSICAL EXAMINATION:    Vitals:    08/07/23 1456   BP: 118/56   Pulse: 81   Resp: 18   Temp: 98 øF (36.7 øC)   TempSrc: Temporal   SpO2: 95%   Weight: 67.6 kg (149 lb)   Height: 154 cm (60.63\")   PainSc: 0-No pain          General:  No acute distress, awake, alert and oriented.  Ambulatory with a rolling walker.  Skin:  LUE bandaged. LLE bandaged. Small hematoma around the LUE bandage. Biopsied lesion on the anterior chest about 1 cm.   HEENT:  Normocephalic/atraumatic.  Chest:  Normal respiratory effort.  Lungs clear to auscultation bilaterally.  Heart: Regular rate and rhythm  Extremities:  No visible clubbing, cyanosis, or edema  Neuro/psych:  Grossly nonfocal.  Normal mood and affect.      DIAGNOSTIC DATA:  CBC & Differential (08/07/2023 14:36)     IMAGING:  None reviewed    ASSESSMENT:  This is a 83 y.o. female with:    *JAK2 positive myeloproliferative disorder, perhaps primary myelofibrosis  She has been seen by Dr. Gerard Foreman with [facility] specialists and cancer and blood disorders in [place].      She was seen there initially on 6/4/2021 with leukocytosis with a white blood cell count of 104,000.  Hemoglobin was normal " at 11.6 and platelets were normal at 299,000.  PCR for BCR/ABL and FISH for BCR/ABL were negative.  A bone marrow aspiration was attempted on 6/9/2021 which was unsuccessful.  She then went to [place] for the summer.  She was admitted to [facility] between 6/29/2021 and 7/9/2021.  She had a bone marrow aspiration and biopsy performed there on 6/30/2021 showing a hypercellular marrow at greater than 95% with 1% blasts.  Focal 1+ reticulin fibrosis was noted.  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  PCR for BCR/ABL was negative.  JAK2 V617F PCR was positive.  Cytogenetics were normal.  Next generation myeloid disorder profiling of the bone marrow aspirate from 6/30/2021 showed TET2 X3466S and E2194Mhp*47 abnormalities and JAK2 V617F.  Therefore she was suspected to have early primary myelofibrosis and she was started on hydroxyurea 1000 mg daily.  Subsequently, hydroxyurea was held.  Blood counts had improved.  She was admitted to the hospital from 2/9 through 2/15/2022 for symptomatic anemia and chest pain.  Her hemoglobin was 5.5.  The white blood cell count was 7.6.  Platelets were 56,000.  Fecal occult blood testing was negative.  She received 3 units of packed red blood cells.  No endoscopy was performed.  A left heart catheterization was performed with a drug-eluting stent placed to the second diagonal on 2/11/2022 and she was discharged from that hospitalization on aspirin 81 mg, Plavix 75 mg, and Eliquis 5 mg twice daily.  Blood counts improved by 3/7/2022 and her white blood cell count was 7.2 with a hemoglobin 11.4 and platelets 295,000.  She did have a bone marrow aspiration and biopsy on 3/7/2022 showing chronic myeloproliferative neoplasm with potentially post ET fibrosis or consideration of primary myelofibrosis.  There was no evidence for myelodysplasia.  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  She also has atrial fibrillation and is anticoagulated with Eliquis.    Hydroxyurea was discontinued and she was started on ruxolitinib 15 mg twice daily at her visit on 3/14/2022 with Dr. Gerard Foreman.  She had CT imaging of the chest abdomen pelvis on 3/19/2022 that did not demonstrate any splenomegaly.  Calcified mediastinal lymphadenopathy was noted.  Borderline pretracheal and right hilar lymphadenopathy noted.  Small bilateral pleural effusions with bibasilar atelectasis noted.  She has moved from [place] to [place] and is seen initially in the office on 5/15/2022.  She tolerates ruxolitinib well.  White blood cell count has decreased from 5.9 from 19.6.  Platelets have normalized at 170,000, down from 464,000.  The hemoglobin has also decreased at 8.6 with an MCV of 98.9.  5/26/2022: White blood cell count mildly elevated at 12.4 with a normal platelet count at 216,000.  She tolerates Jakafi very well.   6/30/2022: White blood cell count a little higher.  Continue monitoring.  9/13/2022: White blood cell count higher at 32,000  Flow cytometry 9/13/2022 with 0.1% myeloblasts with a typical (normal) phenotype.  Nonspecific monocyte population.  Bone marrow aspiration and biopsy on 10/7/2022 with hypercellular marrow at 95% with involvement by chronic myeloproliferative neoplasm, less than 5% blasts.  Mild reticulin fibrosis.  Decreased iron stores.  Consideration of CMML.  Flow cytometry showed a dim CD56 positive aberrant monocyte population at 8.2 percent of events.  Cytogenetics pending.  NGS pending.  10/26/2022: White blood cell count improved at 21,000.  Patient admitted 10/29/2022 with COVID-19 infection.  Jakafi held.  Resumed Jakafi 15 mg twice daily on 11/1/2022.  WBC stable at 51,000  On 2/17/2023 a CT scan of the abdomen and pelvis showed improved splenomegaly at 14.5 cm, previously 16 cm  4/10/2023: White blood cell count reasonably stable at 66,000  6/26/2023: White blood  cell count stable at 42,000  8/7/2023: WBC 50    *Microcytic and now normocytic anemia  8/7/23: hgb 8.7, stable     *History of marantic endocarditis and atrial fibrillation  Anticoagulated with Eliquis 5 mg twice daily  Hold this due to post-Mohs bleeding     *Recent admission with heart failure, positive stress test and LEFTY to a large diagonal branch for in sent stenosis.    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  She does not wish to pursue further evaluation.  She did have some brief epigastric/right upper quadrant pain which has now resolved.  Cholecystectomy by Dr. Dani Grover Jr. on 11/28/2022    *COVID-19 infection  Patient presented to ER on 10/29/2022 with progressive generalized weakness x10 days  Patient tested positive for COVID-19 on admission 10/29/2022  Elevated lactate 3.0  Chest x-ray 10/29/2022 with no evidence of consolidation  Patient was started on dexamethasone and remdesivir  Improved    *GI bleeding February 2023  Presented with abdominal pain, melena.  Hemoglobin 6.1 at presentation.  EGD on 1/25/2023 had noted mild mucosal changes characterized by slowing in the lower third of the esophagus.  Patchy mild inflammation characterized by congestion and erythema found in the gastric body.  The examined duodenum was normal.  Multiple biopsies taken.  Suspected bleeding from prior biopsy site  Anticoagulation with Eliquis and Plavix was held.  On PPI  GI follow-up- EGD/push enteroscopy and colonoscopy when Eliquis held 5 days.    Procedures occurred on 2/9/2023.  Nonbleeding internal hemorrhoids.  No source of bleeding identified.    *Elevated transaminases  She has been seen by gastroenterology  Last AST 77 and ALT 96 with an alkaline phosphatase of 507  Anti-smooth muscle antibodies and mitochondrial antibodies normal  A liver  biopsy has been suggested  6/26/2023: AST mildly elevated at 42, ALT mildly elevated at 44    *Recent rotavirus infection    *Skin lesions  She saw Dr. Fierro at [facility] in [place] with biopsies of both locations showing squamous cell carcinoma  Mohs procedure performed on her leg on 8/4    PLAN:   Continue Jakafi 15 mg twice daily.  Continue Plavix 75 mg daily.    She is holding Eliquis following the Mohs due to bleeding. Since she has another Mohs this Friday, she will hold the Eliquis until a couple of days after that procedure as well, then resume.   We will continue to monitor liver labs.  Dr. Guzman has suggested a liver biopsy which we do not plan for at this time.  Follow-up in 6 weeks with labs    High risk medication requiring intensive monitoring    Darrell Reinoso MD  08/07/2023

## 2023-08-07 ENCOUNTER — OFFICE VISIT (OUTPATIENT)
Dept: ONCOLOGY | Facility: CLINIC | Age: 83
End: 2023-08-07
Payer: MEDICARE

## 2023-08-07 ENCOUNTER — LAB (OUTPATIENT)
Dept: LAB | Facility: HOSPITAL | Age: 83
End: 2023-08-07
Payer: MEDICARE

## 2023-08-07 VITALS
RESPIRATION RATE: 18 BRPM | TEMPERATURE: 98 F | SYSTOLIC BLOOD PRESSURE: 118 MMHG | DIASTOLIC BLOOD PRESSURE: 56 MMHG | WEIGHT: 149 LBS | HEIGHT: 61 IN | HEART RATE: 81 BPM | BODY MASS INDEX: 28.13 KG/M2 | OXYGEN SATURATION: 95 %

## 2023-08-07 DIAGNOSIS — D53.9 MACROCYTIC ANEMIA: ICD-10-CM

## 2023-08-07 DIAGNOSIS — D47.1 MYELOPROLIFERATIVE DISORDER: ICD-10-CM

## 2023-08-07 DIAGNOSIS — D47.1 MYELOPROLIFERATIVE DISORDER: Primary | ICD-10-CM

## 2023-08-07 LAB
BASOPHILS # BLD AUTO: 0.39 10*3/MM3 (ref 0–0.2)
BASOPHILS NFR BLD AUTO: 1 % (ref 0–1.5)
DEPRECATED RDW RBC AUTO: 64.4 FL (ref 37–54)
EOSINOPHIL # BLD AUTO: 0.4 10*3/MM3 (ref 0–0.4)
EOSINOPHIL NFR BLD AUTO: 1 % (ref 0.3–6.2)
ERYTHROCYTE [DISTWIDTH] IN BLOOD BY AUTOMATED COUNT: 18 % (ref 12.3–15.4)
HCT VFR BLD AUTO: 25.9 % (ref 34–46.6)
HGB BLD-MCNC: 8.7 G/DL (ref 12–15.9)
IMM GRANULOCYTES # BLD AUTO: 3.41 10*3/MM3 (ref 0–0.05)
IMM GRANULOCYTES NFR BLD AUTO: 8.5 % (ref 0–0.5)
LYMPHOCYTES # BLD AUTO: 4.41 10*3/MM3 (ref 0.7–3.1)
LYMPHOCYTES NFR BLD AUTO: 10.9 % (ref 19.6–45.3)
MCH RBC QN AUTO: 33.3 PG (ref 26.6–33)
MCHC RBC AUTO-ENTMCNC: 33.6 G/DL (ref 31.5–35.7)
MCV RBC AUTO: 99.2 FL (ref 79–97)
MONOCYTES # BLD AUTO: 5.1 10*3/MM3 (ref 0.1–0.9)
MONOCYTES NFR BLD AUTO: 12.6 % (ref 5–12)
NEUTROPHILS NFR BLD AUTO: 26.64 10*3/MM3 (ref 1.7–7)
NEUTROPHILS NFR BLD AUTO: 66 % (ref 42.7–76)
NRBC BLD AUTO-RTO: 0.1 /100 WBC (ref 0–0.2)
PLATELET # BLD AUTO: 349 10*3/MM3 (ref 140–450)
PMV BLD AUTO: 11.6 FL (ref 6–12)
RBC # BLD AUTO: 2.61 10*6/MM3 (ref 3.77–5.28)
WBC NRBC COR # BLD: 40.35 10*3/MM3 (ref 3.4–10.8)

## 2023-08-07 PROCEDURE — 36415 COLL VENOUS BLD VENIPUNCTURE: CPT

## 2023-08-07 PROCEDURE — 85025 COMPLETE CBC W/AUTO DIFF WBC: CPT

## 2023-08-07 PROCEDURE — 3074F SYST BP LT 130 MM HG: CPT | Performed by: INTERNAL MEDICINE

## 2023-08-07 PROCEDURE — 3078F DIAST BP <80 MM HG: CPT | Performed by: INTERNAL MEDICINE

## 2023-08-07 PROCEDURE — 1126F AMNT PAIN NOTED NONE PRSNT: CPT | Performed by: INTERNAL MEDICINE

## 2023-08-07 PROCEDURE — 1159F MED LIST DOCD IN RCRD: CPT | Performed by: INTERNAL MEDICINE

## 2023-08-07 PROCEDURE — 99214 OFFICE O/P EST MOD 30 MIN: CPT | Performed by: INTERNAL MEDICINE

## 2023-08-07 PROCEDURE — 1160F RVW MEDS BY RX/DR IN RCRD: CPT | Performed by: INTERNAL MEDICINE

## 2023-08-07 RX ORDER — DOXYCYCLINE 100 MG/1
CAPSULE ORAL
COMMUNITY
Start: 2023-08-04

## 2023-08-07 RX ORDER — HYDROCODONE BITARTRATE AND ACETAMINOPHEN 5; 325 MG/1; MG/1
TABLET ORAL SEE ADMIN INSTRUCTIONS
COMMUNITY
Start: 2023-08-04

## 2023-08-10 ENCOUNTER — DOCUMENTATION (OUTPATIENT)
Dept: PHARMACY | Facility: HOSPITAL | Age: 83
End: 2023-08-10
Payer: MEDICARE

## 2023-08-10 NOTE — PROGRESS NOTES
I contacted Karen, pts daughter, to check if a delivery of Jakafi was needed. She states pt has a full unopened bottle. This must have happened due to her hospitalizations.     We agreed to speak in a couple week to check supply status. Refill task moved to 8/28/2023.    Isi Mao, Pharmacy Technician  Specialty Pharmacy Technician

## 2023-08-13 ENCOUNTER — APPOINTMENT (OUTPATIENT)
Dept: CT IMAGING | Facility: HOSPITAL | Age: 83
End: 2023-08-13
Payer: MEDICARE

## 2023-08-13 ENCOUNTER — HOSPITAL ENCOUNTER (OUTPATIENT)
Facility: HOSPITAL | Age: 83
Setting detail: OBSERVATION
Discharge: HOME | End: 2023-08-14
Attending: EMERGENCY MEDICINE | Admitting: EMERGENCY MEDICINE
Payer: MEDICARE

## 2023-08-13 DIAGNOSIS — R11.2 NAUSEA AND VOMITING, UNSPECIFIED VOMITING TYPE: Primary | ICD-10-CM

## 2023-08-13 DIAGNOSIS — N17.9 AKI (ACUTE KIDNEY INJURY): ICD-10-CM

## 2023-08-13 LAB
ALBUMIN SERPL-MCNC: 4.5 G/DL (ref 3.5–5.2)
ALBUMIN SERPL-MCNC: 4.6 G/DL (ref 3.5–5.2)
ALBUMIN/GLOB SERPL: 1.6 G/DL
ALBUMIN/GLOB SERPL: 2.1 G/DL
ALP SERPL-CCNC: 144 U/L (ref 39–117)
ALP SERPL-CCNC: 157 U/L (ref 39–117)
ALT SERPL W P-5'-P-CCNC: 31 U/L (ref 1–33)
ALT SERPL W P-5'-P-CCNC: 34 U/L (ref 1–33)
ANION GAP SERPL CALCULATED.3IONS-SCNC: 17.2 MMOL/L (ref 5–15)
ANION GAP SERPL CALCULATED.3IONS-SCNC: 21 MMOL/L (ref 5–15)
AST SERPL-CCNC: 16 U/L (ref 1–32)
AST SERPL-CCNC: 31 U/L (ref 1–32)
B PARAPERT DNA SPEC QL NAA+PROBE: NOT DETECTED
B PERT DNA SPEC QL NAA+PROBE: NOT DETECTED
BILIRUB SERPL-MCNC: 0.5 MG/DL (ref 0–1.2)
BILIRUB SERPL-MCNC: 0.5 MG/DL (ref 0–1.2)
BILIRUB UR QL STRIP: NEGATIVE
BUN SERPL-MCNC: 37 MG/DL (ref 8–23)
BUN SERPL-MCNC: 37 MG/DL (ref 8–23)
BUN/CREAT SERPL: 31.9 (ref 7–25)
BUN/CREAT SERPL: 36.3 (ref 7–25)
C PNEUM DNA NPH QL NAA+NON-PROBE: NOT DETECTED
CALCIUM SPEC-SCNC: 9.2 MG/DL (ref 8.6–10.5)
CALCIUM SPEC-SCNC: 9.5 MG/DL (ref 8.6–10.5)
CHLORIDE SERPL-SCNC: 92 MMOL/L (ref 98–107)
CHLORIDE SERPL-SCNC: 97 MMOL/L (ref 98–107)
CLARITY UR: CLEAR
CO2 SERPL-SCNC: 22 MMOL/L (ref 22–29)
CO2 SERPL-SCNC: 23.8 MMOL/L (ref 22–29)
COLOR UR: YELLOW
CREAT SERPL-MCNC: 1.02 MG/DL (ref 0.57–1)
CREAT SERPL-MCNC: 1.16 MG/DL (ref 0.57–1)
DEPRECATED RDW RBC AUTO: 62.4 FL (ref 37–54)
EGFRCR SERPLBLD CKD-EPI 2021: 46.3 ML/MIN/1.73
EGFRCR SERPLBLD CKD-EPI 2021: 54 ML/MIN/1.73
ERYTHROCYTE [DISTWIDTH] IN BLOOD BY AUTOMATED COUNT: 18.4 % (ref 12.3–15.4)
FLUAV SUBTYP SPEC NAA+PROBE: NOT DETECTED
FLUBV RNA ISLT QL NAA+PROBE: NOT DETECTED
GLOBULIN UR ELPH-MCNC: 2.2 GM/DL
GLOBULIN UR ELPH-MCNC: 2.8 GM/DL
GLUCOSE BLDC GLUCOMTR-MCNC: 184 MG/DL (ref 70–130)
GLUCOSE SERPL-MCNC: 160 MG/DL (ref 65–99)
GLUCOSE SERPL-MCNC: 176 MG/DL (ref 65–99)
GLUCOSE UR STRIP-MCNC: NEGATIVE MG/DL
HADV DNA SPEC NAA+PROBE: NOT DETECTED
HCOV 229E RNA SPEC QL NAA+PROBE: NOT DETECTED
HCOV HKU1 RNA SPEC QL NAA+PROBE: NOT DETECTED
HCOV NL63 RNA SPEC QL NAA+PROBE: NOT DETECTED
HCOV OC43 RNA SPEC QL NAA+PROBE: NOT DETECTED
HCT VFR BLD AUTO: 22.4 % (ref 34–46.6)
HGB BLD-MCNC: 7.8 G/DL (ref 12–15.9)
HGB UR QL STRIP.AUTO: NEGATIVE
HMPV RNA NPH QL NAA+NON-PROBE: NOT DETECTED
HPIV1 RNA ISLT QL NAA+PROBE: NOT DETECTED
HPIV2 RNA SPEC QL NAA+PROBE: NOT DETECTED
HPIV3 RNA NPH QL NAA+PROBE: NOT DETECTED
HPIV4 P GENE NPH QL NAA+PROBE: NOT DETECTED
KETONES UR QL STRIP: NEGATIVE
LEUKOCYTE ESTERASE UR QL STRIP.AUTO: NEGATIVE
LIPASE SERPL-CCNC: 17 U/L (ref 13–60)
LYMPHOCYTES # BLD MANUAL: 0.51 10*3/MM3 (ref 0.7–3.1)
LYMPHOCYTES NFR BLD MANUAL: 4 % (ref 5–12)
M PNEUMO IGG SER IA-ACNC: NOT DETECTED
MCH RBC QN AUTO: 33.1 PG (ref 26.6–33)
MCHC RBC AUTO-ENTMCNC: 34.8 G/DL (ref 31.5–35.7)
MCV RBC AUTO: 94.9 FL (ref 79–97)
METAMYELOCYTES NFR BLD MANUAL: 2 % (ref 0–0)
MONOCYTES # BLD: 2.04 10*3/MM3 (ref 0.1–0.9)
MYELOCYTES NFR BLD MANUAL: 3 % (ref 0–0)
NEUTROPHILS # BLD AUTO: 45.97 10*3/MM3 (ref 1.7–7)
NEUTROPHILS NFR BLD MANUAL: 90 % (ref 42.7–76)
NITRITE UR QL STRIP: NEGATIVE
NRBC BLD AUTO-RTO: 0.1 /100 WBC (ref 0–0.2)
PH UR STRIP.AUTO: 5.5 [PH] (ref 5–8)
PLAT MORPH BLD: NORMAL
PLATELET # BLD AUTO: 305 10*3/MM3 (ref 140–450)
PMV BLD AUTO: 12.2 FL (ref 6–12)
POTASSIUM SERPL-SCNC: 5 MMOL/L (ref 3.5–5.2)
POTASSIUM SERPL-SCNC: 5.5 MMOL/L (ref 3.5–5.2)
PROT SERPL-MCNC: 6.8 G/DL (ref 6–8.5)
PROT SERPL-MCNC: 7.3 G/DL (ref 6–8.5)
PROT UR QL STRIP: ABNORMAL
RBC # BLD AUTO: 2.36 10*6/MM3 (ref 3.77–5.28)
RBC MORPH BLD: NORMAL
RHINOVIRUS RNA SPEC NAA+PROBE: NOT DETECTED
RSV RNA NPH QL NAA+NON-PROBE: NOT DETECTED
SARS-COV-2 RNA NPH QL NAA+NON-PROBE: NOT DETECTED
SODIUM SERPL-SCNC: 135 MMOL/L (ref 136–145)
SODIUM SERPL-SCNC: 138 MMOL/L (ref 136–145)
SP GR UR STRIP: 1.03 (ref 1–1.03)
UROBILINOGEN UR QL STRIP: ABNORMAL
VARIANT LYMPHS NFR BLD MANUAL: 1 % (ref 19.6–45.3)
WBC MORPH BLD: NORMAL
WBC NRBC COR # BLD: 51.08 10*3/MM3 (ref 3.4–10.8)

## 2023-08-13 PROCEDURE — 63710000001 PROMETHAZINE PER 25 MG: Performed by: STUDENT IN AN ORGANIZED HEALTH CARE EDUCATION/TRAINING PROGRAM

## 2023-08-13 PROCEDURE — 0202U NFCT DS 22 TRGT SARS-COV-2: CPT | Performed by: PHYSICIAN ASSISTANT

## 2023-08-13 PROCEDURE — G0378 HOSPITAL OBSERVATION PER HR: HCPCS

## 2023-08-13 PROCEDURE — 83690 ASSAY OF LIPASE: CPT | Performed by: PHYSICIAN ASSISTANT

## 2023-08-13 PROCEDURE — 96376 TX/PRO/DX INJ SAME DRUG ADON: CPT

## 2023-08-13 PROCEDURE — 25010000002 MORPHINE PER 10 MG: Performed by: EMERGENCY MEDICINE

## 2023-08-13 PROCEDURE — 85007 BL SMEAR W/DIFF WBC COUNT: CPT | Performed by: PHYSICIAN ASSISTANT

## 2023-08-13 PROCEDURE — 82948 REAGENT STRIP/BLOOD GLUCOSE: CPT

## 2023-08-13 PROCEDURE — 25010000002 ONDANSETRON PER 1 MG: Performed by: STUDENT IN AN ORGANIZED HEALTH CARE EDUCATION/TRAINING PROGRAM

## 2023-08-13 PROCEDURE — 99285 EMERGENCY DEPT VISIT HI MDM: CPT

## 2023-08-13 PROCEDURE — 25510000001 IOPAMIDOL 61 % SOLUTION: Performed by: EMERGENCY MEDICINE

## 2023-08-13 PROCEDURE — 85025 COMPLETE CBC W/AUTO DIFF WBC: CPT | Performed by: PHYSICIAN ASSISTANT

## 2023-08-13 PROCEDURE — 81003 URINALYSIS AUTO W/O SCOPE: CPT | Performed by: PHYSICIAN ASSISTANT

## 2023-08-13 PROCEDURE — 80053 COMPREHEN METABOLIC PANEL: CPT | Performed by: PHYSICIAN ASSISTANT

## 2023-08-13 PROCEDURE — 96375 TX/PRO/DX INJ NEW DRUG ADDON: CPT

## 2023-08-13 PROCEDURE — 96374 THER/PROPH/DIAG INJ IV PUSH: CPT

## 2023-08-13 PROCEDURE — 25010000002 ONDANSETRON PER 1 MG: Performed by: PHYSICIAN ASSISTANT

## 2023-08-13 PROCEDURE — 74177 CT ABD & PELVIS W/CONTRAST: CPT

## 2023-08-13 PROCEDURE — 25010000002 ONDANSETRON PER 1 MG: Performed by: EMERGENCY MEDICINE

## 2023-08-13 PROCEDURE — 80053 COMPREHEN METABOLIC PANEL: CPT | Performed by: EMERGENCY MEDICINE

## 2023-08-13 RX ORDER — METOPROLOL SUCCINATE 25 MG/1
25 TABLET, EXTENDED RELEASE ORAL 2 TIMES DAILY
Status: DISCONTINUED | OUTPATIENT
Start: 2023-08-13 | End: 2023-08-14 | Stop reason: HOSPADM

## 2023-08-13 RX ORDER — FAMOTIDINE 20 MG/1
20 TABLET, FILM COATED ORAL DAILY
Status: DISCONTINUED | OUTPATIENT
Start: 2023-08-13 | End: 2023-08-14 | Stop reason: HOSPADM

## 2023-08-13 RX ORDER — ACETAMINOPHEN 325 MG/1
650 TABLET ORAL EVERY 4 HOURS PRN
Status: DISCONTINUED | OUTPATIENT
Start: 2023-08-13 | End: 2023-08-14 | Stop reason: HOSPADM

## 2023-08-13 RX ORDER — BISACODYL 5 MG/1
5 TABLET, DELAYED RELEASE ORAL DAILY PRN
Status: DISCONTINUED | OUTPATIENT
Start: 2023-08-13 | End: 2023-08-14 | Stop reason: HOSPADM

## 2023-08-13 RX ORDER — BISACODYL 10 MG
10 SUPPOSITORY, RECTAL RECTAL DAILY PRN
Status: DISCONTINUED | OUTPATIENT
Start: 2023-08-13 | End: 2023-08-14 | Stop reason: HOSPADM

## 2023-08-13 RX ORDER — NITROGLYCERIN 0.4 MG/1
0.4 TABLET SUBLINGUAL
Status: DISCONTINUED | OUTPATIENT
Start: 2023-08-13 | End: 2023-08-14 | Stop reason: HOSPADM

## 2023-08-13 RX ORDER — SODIUM CHLORIDE 0.9 % (FLUSH) 0.9 %
10 SYRINGE (ML) INJECTION AS NEEDED
Status: DISCONTINUED | OUTPATIENT
Start: 2023-08-13 | End: 2023-08-14 | Stop reason: HOSPADM

## 2023-08-13 RX ORDER — CLOPIDOGREL BISULFATE 75 MG/1
75 TABLET ORAL DAILY
Status: DISCONTINUED | OUTPATIENT
Start: 2023-08-13 | End: 2023-08-14 | Stop reason: HOSPADM

## 2023-08-13 RX ORDER — FUROSEMIDE 40 MG/1
40 TABLET ORAL DAILY
Status: DISCONTINUED | OUTPATIENT
Start: 2023-08-14 | End: 2023-08-14 | Stop reason: HOSPADM

## 2023-08-13 RX ORDER — TRAZODONE HYDROCHLORIDE 50 MG/1
25 TABLET ORAL NIGHTLY PRN
Status: DISCONTINUED | OUTPATIENT
Start: 2023-08-13 | End: 2023-08-14 | Stop reason: HOSPADM

## 2023-08-13 RX ORDER — ONDANSETRON 2 MG/ML
4 INJECTION INTRAMUSCULAR; INTRAVENOUS ONCE
Status: COMPLETED | OUTPATIENT
Start: 2023-08-13 | End: 2023-08-13

## 2023-08-13 RX ORDER — FAMOTIDINE 10 MG/ML
20 INJECTION, SOLUTION INTRAVENOUS ONCE
Status: COMPLETED | OUTPATIENT
Start: 2023-08-13 | End: 2023-08-13

## 2023-08-13 RX ORDER — PANTOPRAZOLE SODIUM 40 MG/1
40 TABLET, DELAYED RELEASE ORAL
Status: DISCONTINUED | OUTPATIENT
Start: 2023-08-14 | End: 2023-08-14 | Stop reason: HOSPADM

## 2023-08-13 RX ORDER — POLYETHYLENE GLYCOL 3350 17 G/17G
17 POWDER, FOR SOLUTION ORAL DAILY PRN
Status: DISCONTINUED | OUTPATIENT
Start: 2023-08-13 | End: 2023-08-14 | Stop reason: HOSPADM

## 2023-08-13 RX ORDER — SODIUM CHLORIDE 0.9 % (FLUSH) 0.9 %
10 SYRINGE (ML) INJECTION EVERY 12 HOURS SCHEDULED
Status: DISCONTINUED | OUTPATIENT
Start: 2023-08-13 | End: 2023-08-14 | Stop reason: HOSPADM

## 2023-08-13 RX ORDER — LISINOPRIL 2.5 MG/1
2.5 TABLET ORAL DAILY
Status: DISCONTINUED | OUTPATIENT
Start: 2023-08-14 | End: 2023-08-14 | Stop reason: HOSPADM

## 2023-08-13 RX ORDER — SODIUM CHLORIDE, SODIUM LACTATE, POTASSIUM CHLORIDE, CALCIUM CHLORIDE 600; 310; 30; 20 MG/100ML; MG/100ML; MG/100ML; MG/100ML
50 INJECTION, SOLUTION INTRAVENOUS CONTINUOUS
Status: ACTIVE | OUTPATIENT
Start: 2023-08-13 | End: 2023-08-14

## 2023-08-13 RX ORDER — CHOLECALCIFEROL (VITAMIN D3) 125 MCG
5 CAPSULE ORAL NIGHTLY PRN
Status: DISCONTINUED | OUTPATIENT
Start: 2023-08-13 | End: 2023-08-14 | Stop reason: HOSPADM

## 2023-08-13 RX ORDER — SODIUM CHLORIDE 9 MG/ML
40 INJECTION, SOLUTION INTRAVENOUS AS NEEDED
Status: DISCONTINUED | OUTPATIENT
Start: 2023-08-13 | End: 2023-08-14 | Stop reason: HOSPADM

## 2023-08-13 RX ORDER — MORPHINE SULFATE 2 MG/ML
2 INJECTION, SOLUTION INTRAMUSCULAR; INTRAVENOUS ONCE
Status: COMPLETED | OUTPATIENT
Start: 2023-08-13 | End: 2023-08-13

## 2023-08-13 RX ORDER — NYSTATIN 100000 [USP'U]/G
POWDER TOPICAL EVERY 12 HOURS SCHEDULED
Status: DISCONTINUED | OUTPATIENT
Start: 2023-08-13 | End: 2023-08-14 | Stop reason: HOSPADM

## 2023-08-13 RX ORDER — AMOXICILLIN 250 MG
2 CAPSULE ORAL 2 TIMES DAILY
Status: DISCONTINUED | OUTPATIENT
Start: 2023-08-13 | End: 2023-08-14 | Stop reason: HOSPADM

## 2023-08-13 RX ORDER — ONDANSETRON 2 MG/ML
4 INJECTION INTRAMUSCULAR; INTRAVENOUS EVERY 6 HOURS PRN
Status: DISCONTINUED | OUTPATIENT
Start: 2023-08-13 | End: 2023-08-14 | Stop reason: HOSPADM

## 2023-08-13 RX ORDER — PROMETHAZINE HYDROCHLORIDE 25 MG/1
12.5 TABLET ORAL EVERY 6 HOURS PRN
Status: DISCONTINUED | OUTPATIENT
Start: 2023-08-13 | End: 2023-08-14 | Stop reason: HOSPADM

## 2023-08-13 RX ADMIN — ONDANSETRON 4 MG: 2 INJECTION INTRAMUSCULAR; INTRAVENOUS at 14:45

## 2023-08-13 RX ADMIN — FAMOTIDINE 20 MG: 20 TABLET, FILM COATED ORAL at 20:12

## 2023-08-13 RX ADMIN — MORPHINE SULFATE 2 MG: 2 INJECTION, SOLUTION INTRAMUSCULAR; INTRAVENOUS at 11:07

## 2023-08-13 RX ADMIN — IOPAMIDOL 85 ML: 612 INJECTION, SOLUTION INTRAVENOUS at 12:19

## 2023-08-13 RX ADMIN — FAMOTIDINE 20 MG: 10 INJECTION INTRAVENOUS at 10:30

## 2023-08-13 RX ADMIN — SODIUM CHLORIDE, POTASSIUM CHLORIDE, SODIUM LACTATE AND CALCIUM CHLORIDE 50 ML/HR: 600; 310; 30; 20 INJECTION, SOLUTION INTRAVENOUS at 16:08

## 2023-08-13 RX ADMIN — SODIUM CHLORIDE 500 ML: 9 INJECTION, SOLUTION INTRAVENOUS at 10:30

## 2023-08-13 RX ADMIN — Medication 10 ML: at 20:12

## 2023-08-13 RX ADMIN — ONDANSETRON 4 MG: 2 INJECTION INTRAMUSCULAR; INTRAVENOUS at 11:07

## 2023-08-13 RX ADMIN — CLOPIDOGREL BISULFATE 75 MG: 75 TABLET, FILM COATED ORAL at 20:11

## 2023-08-13 RX ADMIN — ONDANSETRON 4 MG: 2 INJECTION INTRAMUSCULAR; INTRAVENOUS at 10:30

## 2023-08-13 RX ADMIN — METOPROLOL SUCCINATE 25 MG: 25 TABLET, EXTENDED RELEASE ORAL at 20:11

## 2023-08-13 RX ADMIN — PROMETHAZINE HYDROCHLORIDE 12.5 MG: 25 TABLET ORAL at 15:36

## 2023-08-13 RX ADMIN — NYSTATIN: 100000 POWDER TOPICAL at 20:12

## 2023-08-13 NOTE — H&P
[facility]   HISTORY AND PHYSICAL    Patient Name: Catherine Boyer  : 1940  MRN: [id number]  Primary Care Physician:  Kristi Moreau APRN  Date of admission: 2023    Subjective   Subjective     Chief Complaint:   Chief Complaint   Patient presents with    Vomiting         HPI:    Catherine Boyer is a 83 y.o. female history of atrial fibrillation on Eliquis, coronary artery disease, hypertension, myeloproliferative disorder-JAK2 positive, type 2 diabetes who presents to [facility] ER with stomach pain nausea and vomiting.  Patient states on Friday she had a Mohs procedure at her dermatologist.  She states she was feeling well until Saturday afternoon when she became nauseous and had several episodes of vomiting.  She states she had some abdominal cramping but was able to get some sleep that night.  She states today she started to have some epigastric discomfort as well as continued dry heaving.  She states she has not been able to keep any food down or much liquid.  She states she is feeling a bit better during our encounter however by the end was continuing to have some dry heaving.  She denies recent fevers or chills.  Denies cough and sore throat.  Denies chest pain and shortness of breath.  Denies headache.  Denies lightheadedness and dizziness.    In the ER, CT of the abdomen and pelvis with shows diverticulosis without acute inflammatory process of the bowel, no obstructive uropathy, faint patchy groundglass density in the right lower lung.  Urinalysis clean.  Respiratory panel negative.  Lab work notable for an elevated creatinine at 1.02, white blood cell count 51.08 at baseline, hemoglobin 7.8.  Patient was given IV fluids, antiemetics in the ER.    Review of Systems   All systems were reviewed and negative except for: what is mentioned above in the HPI.    Personal History     Past Medical History:   Diagnosis Date    Atherosclerosis of abdominal aorta 2023    Atrial  fibrillation     Breast cancer     CAD (coronary artery disease)     NSTEMI 2/2022: 90% ostial LAD, 99% D1, 70% mid-distal LAD (medical therapy). She received two stents (2.5x18 and 2.5x26mm Finesse LEFTY) but I don't know which one went to which lesion.    Carotid atherosclerosis     Cholecystitis 11/22/2022    Added automatically from request for surgery [id number]    Chronic diastolic (congestive) heart failure     COVID 10/29/2022    GERD (gastroesophageal reflux disease)     Glaucoma     History of cataract     Hypertension     Microcytic anemia     per Dr. oleg pate office  note 6/30/22-dd    Myeloproliferative disorder     JAK2 positive    Nonbacterial thrombotic endocarditis     6/2021: 4x5mm vegetation on the ventricular surface of the anterior MV, negative blood cultures    Porcelain gallbladder     PUD (peptic ulcer disease)     TIA (transient ischemic attack)     Type 2 diabetes mellitus     Type 2 diabetes mellitus with circulatory disorder, without long-term current use of insulin 07/14/2022    Upper GI bleed        Past Surgical History:   Procedure Laterality Date    BREAST LUMPECTOMY  1999    CARDIAC CATHETERIZATION N/A 09/02/2022    Procedure: Coronary angiography;  Surgeon: Guero Verde MD;  Location: [facility] CATH INVASIVE LOCATION;  Service: Cardiovascular;  Laterality: N/A;    CARDIAC CATHETERIZATION N/A 09/02/2022    Procedure: Stent LEFTY coronary;  Surgeon: Guero Verde MD;  Location: [facility] CATH INVASIVE LOCATION;  Service: Cardiovascular;  Laterality: N/A;    CATARACT EXTRACTION  2011    CHOLECYSTECTOMY      CHOLECYSTECTOMY WITH INTRAOPERATIVE CHOLANGIOGRAM N/A 11/28/2022    Procedure: CHOLECYSTECTOMY LAPAROSCOPIC INTRAOPERATIVE CHOLANGIOGRAM;  Surgeon: Dani Grover Jr., MD;  Location: [facility] MAIN OR;  Service: General;  Laterality: N/A;    COLONOSCOPY N/A 02/09/2023    Procedure: COLONOSCOPY TO CECUM & T.I.;  Surgeon: Guero Ferris MD;  Location: [facility] ENDOSCOPY;  Service:  Gastroenterology;  Laterality: N/A;  PRE- MELENA, GI BLEED  POST- DIVERTICULOSIS, INT HEMORRHOIDS    ENDOSCOPY N/A 01/25/2023    Procedure: ESOPHAGOGASTRODUODENOSCOPY WITH BIOPSIES;  Surgeon: Charlse Diaz MD;  Location: [facility] ENDOSCOPY;  Service: Gastroenterology;  Laterality: N/A;  pre: abd pain, nausea  post: hiatal hernia, mild gastritis, sloughing of the esophagus    ENTEROSCOPY SMALL BOWEL N/A 02/09/2023    Procedure: ENTEROSCOPY SMALL BOWEL;  Surgeon: Guero Ferris MD;  Location: [facility] ENDOSCOPY;  Service: Gastroenterology;  Laterality: N/A;  PRE- MELENA, GI BLEED  POST- HIATAL HERNIA    JOINT REPLACEMENT      UPPER GASTROINTESTINAL ENDOSCOPY         Family History: family history includes Lung cancer in her father and sister; Ovarian cancer in her mother. Otherwise pertinent FHx was reviewed and not pertinent to current issue.    Social History:  reports that she has quit smoking. Her smoking use included cigarettes. She has a 20.00 pack-year smoking history. She has been exposed to tobacco smoke. She has never used smokeless tobacco. She reports current alcohol use. She reports that she does not use drugs.    Home Medications:  HYDROcodone-acetaminophen, acetaminophen, apixaban, atorvastatin, clopidogrel, doxycycline, empagliflozin, famotidine, furosemide, hydrocortisone-bacitracin-zinc oxide-nystatin, latanoprost, lisinopril, loperamide, metoprolol succinate XL, mupirocin, ondansetron, pantoprazole, ruxolitinib, saccharomyces boulardii, sertraline, simethicone, and traZODone    Allergies:  Allergies   Allergen Reactions    Aspirin Unknown - Low Severity    Oxycodone Unknown - Low Severity    Hydroxyurea Other (See Comments)     Her hemoglobin drop and was stop in February 2022 and start taking Jakafi    Diphenhydramine Hcl Anxiety and Unknown (See Comments)     Benadryl IVP       Objective   Objective     Vitals:   Temp:  [98.3 øF (36.8 øC)-98.4 øF (36.9 øC)] 98.4 øF (36.9 øC)  Heart Rate:  [84-98]  94  Resp:  [15-17] 15  BP: (117-132)/(56-69) 132/65  Flow (L/min):  [2] 2  Physical Exam    Constitutional: 83-year-old female in no acute distress on room air   Eyes: PERRLA, sclerae anicteric, no conjunctival injection   HENT: NCAT, mucous membranes moist   Neck: Supple, no thyromegaly, no lymphadenopathy, trachea midline   Respiratory: Clear to auscultation bilaterally, nonlabored respirations    Cardiovascular: RRR, no murmurs, rubs, or gallops, palpable pedal pulses bilaterally   Gastrointestinal: Positive bowel sounds, soft, epigastric tenderness to palpation without guarding or rebound, nondistended   Musculoskeletal: No bilateral ankle edema, no clubbing or cyanosis to extremities   Psychiatric: Appropriate affect, cooperative   Neurologic: Oriented x 3, strength symmetric in all extremities, Cranial Nerves grossly intact to confrontation, speech clear   Skin: No rashes     Result Review    Result Review:  I have personally reviewed the results from the time of this admission to 8/13/2023 15:26 EDT and agree with these findings:  [x]  Laboratory list / accordion  []  Microbiology  [x]  Radiology  []  EKG/Telemetry   []  Cardiology/Vascular   []  Pathology  [x]  Old records  []  Other:  Most notable findings include:     CT Abdomen Pelvis With Contrast    Result Date: 8/13/2023  CT ABDOMEN PELVIS W CONTRAST-  INDICATIONS: Pain and vomiting  TECHNIQUE: Radiation dose reduction techniques were utilized, including automated exposure control and exposure modulation based on body size. Enhanced ABDOMEN AND PELVIS CT  COMPARISON: 05/04/2023  FINDINGS:  The gallbladder is surgically absent.  A right renal cyst is seen. Left renal low density is noted, too small to characterize.  A subtle, nonspecific hypodensity in the spleen, 2 cm on axial image 28, stable lesions far back as 01/23/2023, could be further characterized with MRI as indicated, continued follow-up can characterize continued stability.  Mild chronic  nonspecific bilateral adrenal gland thickening.  Otherwise unremarkable appearance of the liver, spleen, adrenal glands, pancreas, kidneys, bladder.  No bowel obstruction or abnormal bowel thickening is identified. Colonic diverticula are seen that do not appear inflamed. Mild hiatal hernia is present.  No free intraperitoneal gas or free fluid. Umbilical hernia of fat is present.  Scattered small mesenteric and para-aortic lymph nodes are seen that are not significant by size criteria.  Abdominal aorta is not aneurysmal. Aortic and other arterial calcifications are present.  The lung bases show minimal atelectasis. Faint patchy groundglass densities in the right lower lung could be developing edema or pneumonia, correlate clinically, follow-up chest imaging recommended.  Degenerative changes are seen in the spine. No acute fracture is identified.          1. Colonic diverticulosis. No acute inflammatory process of bowel is identified. Follow-up as indications persist. 2. No obstructive uropathy. Stable indeterminate splenic low density. 3. Faint patchy groundglass densities in the right lower lung could be developing edema or pneumonia, correlate clinically, follow-up chest imaging recommended.  This report was finalized on 8/13/2023 1:22 PM by Dr. Reilly Ritter M.D.           Assessment & Plan   Assessment / Plan     Brief Patient Summary:  Catherine Boyer is a 83 y.o. female who being admitted the observation unit with intractable nausea and vomiting and KARLI.  Plan for supportive treatment and IV fluid hydration.    Active Hospital Problems:  Active Hospital Problems    Diagnosis     **Intractable nausea and vomiting     KARLI (acute kidney injury)      Plan:     Intractable nausea and vomiting  Acute kidney injury  -CT abdomen shows no acute findings, does note a faint patchy groundglass density in the right lower lung which could be a developing edema or pneumonia.  On previous CT chest imaging shows patient  with history of atelectasis and bilateral pulmonary effusions.  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 Reinoso with hematology outpatient    Hypertension  -Continue lisinopril and metoprolol  -Monitor with vital signs every 4 hours    Type 2 diabetes  -Continue Jardiance  -Sliding scale insulin and Accu-Cheks with meals and at bedtime      DVT prophylaxis:  Mechanical DVT prophylaxis orders are present.    CODE STATUS:    Level Of Support Discussed With: Patient; Health Care Surrogate  Code Status (Patient has no pulse and is not breathing): No CPR (Do Not Attempt to Resuscitate)  Medical Interventions (Patient has pulse or is breathing): Full Support    Admission Status:  I believe this patient meets observation status.    77 minutes have been spent by [facility] providers in the care of this patient while under observation status.    I have discussed plan of care with patient including advance care plan and/or surrogate decision maker.  Patient advises that their daughter, Ila Boyer will be their primary surrogate decision maker    Appropriate PPE worn during patient encounter.  Hand hygeine performed before and after seeing the patient.      Electronically signed by Camelia ADEN  REJI Varma, 08/13/23, 2:46 PM EDT.

## 2023-08-13 NOTE — ED PROVIDER NOTES
MD ATTESTATION NOTE    The VINNY and I have discussed this patient's history, physical exam, and treatment plan.  I have reviewed the documentation and personally had a face to face interaction with the patient. I affirm the documentation and agree with the treatment and plan.  The attached note describes my personal findings.      I provided a substantive portion of the care of the patient.  I personally performed the physical exam in its entirety, and below are my findings.  For this patient encounter, the patient wore surgical mask, I wore full protective PPE including N95 and eye protection.      Brief HPI: Patient presents for evaluation of nausea and vomiting.  States that started yesterday.  Patient has had epigastric abdominal pain.  Prior cholecystectomy.  No diarrhea.  No fevers or chills.    PHYSICAL EXAM  ED Triage Vitals [08/13/23 0958]   Temp Heart Rate Resp BP SpO2   98.3 øF (36.8 øC) 84 16 117/64 98 %      Temp src Heart Rate Source Patient Position BP Location FiO2 (%)   Oral -- -- -- --         GENERAL: no acute distress  HENT: nares patent  EYES: no scleral icterus  CV: regular rhythm, normal rate  RESPIRATORY: normal effort  ABDOMEN: soft.  Tenderness epigastric  MUSCULOSKELETAL: no deformity  NEURO: alert, moves all extremities, follows commands  PSYCH:  calm, cooperative  SKIN: warm, dry    Vital signs and nursing notes reviewed.        Plan: CT abdomen       Sharif Haynes MD  08/13/23 2842

## 2023-08-13 NOTE — ED PROVIDER NOTES
EMERGENCY DEPARTMENT ENCOUNTER    Room Number:  07/07  PCP: Kristi Moreau APRN  Discussed/ obtained information from independent historians: EMS      HPI:  Chief Complaint: Nausea and vomiting    Context: Catherine Boyer is a 83 y.o. female who presents to the ED c/o nausea and vomiting that started yesterday afternoon.  Patient reports associated epigastric pain when she vomits.  She also reports associated generalized body aches.  No injury or trauma to the abdomen.  No known sick contacts.  Patient did just undergo Mohs procedures on her left leg and chest wall on 8/4 and 8/11.  She has been holding Eliquis since the Mohs procedure.  Patient has had prior cholecystectomy.  Patient denies fever, syncope, hematemesis, diarrhea, dysuria, or any other systemic complaint.      External (non-ED) record review:   Reviewed note from office visit with oncology on 8/7/2023 where patient seen for myeloproliferative disorder and macrocytic anemia.  Reviewed assessment and plan.  Plan to continue Jakafi, patient to follow-up in 6 weeks.  Most recent CBC with hemoglobin 8.7.  Most recent CMP with creatinine 0.67.      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    Paroxysmal atrial fibrillation 07/14/2022    Myeloproliferative disorder 07/14/2022    Microcytic anemia 07/14/2022    Type 2 diabetes mellitus with circulatory disorder, without long-term current use of insulin 07/14/2022    GERD (gastroesophageal reflux disease)     Hypertension     Personal history of transient ischemic attack (TIA), 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 deficits 09/04/2022    Porcelain gallbladder     Breast cancer     Chronic diastolic heart failure     Gastritis 01/26/2023    Atherosclerosis of abdominal aorta 02/05/2023    APC (atrial premature contractions) 02/24/2023    Moderate malnutrition 05/05/2023     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 05/05/2023     Resolved Ambulatory Problems     Diagnosis Date Noted    Acute on chronic diastolic heart failure 07/14/2022    Chest pain with high risk for cardiac etiology 08/31/2022    COVID 10/29/2022    Acute UTI (urinary tract infection) 11/21/2022    Cholecystitis 11/22/2022    Urinary tract infection without hematuria, site unspecified 01/23/2023    Epigastric abdominal pain 01/24/2023    ABLA (acute blood loss anemia) 02/04/2023    Hypokalemia 02/07/2023    Rotavirus enteritis 05/04/2023    KARLI (acute kidney injury) 05/04/2023    Acute renal failure, unspecified acute renal failure type 05/04/2023     Past Medical History:   Diagnosis Date    Atrial fibrillation     Carotid atherosclerosis     Chronic diastolic (congestive) heart failure     Glaucoma     History of cataract     PUD (peptic ulcer disease)     TIA (transient ischemic attack)     Type 2 diabetes mellitus     Upper GI bleed          PAST SURGICAL HISTORY  Past Surgical History:   Procedure Laterality Date    BREAST LUMPECTOMY  1999    CARDIAC CATHETERIZATION N/A 09/02/2022    Procedure: Coronary angiography;  Surgeon: Guero Verde MD;  Location: [facility] CATH INVASIVE LOCATION;  Service: Cardiovascular;  Laterality: N/A;    CARDIAC CATHETERIZATION N/A 09/02/2022    Procedure: Stent LEFTY coronary;  Surgeon: Guero Verde MD;  Location: [facility] CATH INVASIVE LOCATION;  Service: Cardiovascular;  Laterality: N/A;    CATARACT EXTRACTION  2011    CHOLECYSTECTOMY      CHOLECYSTECTOMY WITH INTRAOPERATIVE CHOLANGIOGRAM N/A 11/28/2022    Procedure: CHOLECYSTECTOMY LAPAROSCOPIC INTRAOPERATIVE CHOLANGIOGRAM;  Surgeon: Dani Grover Jr., MD;  Location: [facility] MAIN OR;  Service: General;  Laterality: N/A;    COLONOSCOPY N/A 02/09/2023    Procedure: COLONOSCOPY TO CECUM & T.I.;  Surgeon: Guero Ferris MD;  Location: [facility] ENDOSCOPY;  Service: Gastroenterology;  Laterality: N/A;  PRE- MELENA, GI BLEED  POST-  "DIVERTICULOSIS, INT HEMORRHOIDS    ENDOSCOPY N/A 01/25/2023    Procedure: ESOPHAGOGASTRODUODENOSCOPY WITH BIOPSIES;  Surgeon: Charles Diaz MD;  Location:  [facility];  Service: Gastroenterology;  Laterality: N/A;  pre: abd pain, nausea  post: hiatal hernia, mild gastritis, sloughing of the esophagus    ENTEROSCOPY SMALL BOWEL N/A 02/09/2023    Procedure: ENTEROSCOPY SMALL BOWEL;  Surgeon: Guero Ferris MD;  Location:  [facility];  Service: Gastroenterology;  Laterality: N/A;  PRE- MELENA, GI BLEED  POST- HIATAL HERNIA    JOINT REPLACEMENT      UPPER GASTROINTESTINAL ENDOSCOPY           FAMILY HISTORY  Family History   Problem Relation Age of Onset    Ovarian cancer Mother     Lung cancer Father     Lung cancer Sister     Malig Hyperthermia Neg Hx          SOCIAL HISTORY  Social History     Socioeconomic History    Marital status:    Tobacco Use    Smoking status: Former     Packs/day: 1.00     Years: 20.00     Pack years: 20.00     Types: Cigarettes     Passive exposure: Past    Smokeless tobacco: Never    Tobacco comments:     30  years ago   Vaping Use    Vaping Use: Never used   Substance and Sexual Activity    Alcohol use: Yes     Comment: \"rare\"    Drug use: Never    Sexual activity: Defer         ALLERGIES  Aspirin, Oxycodone, Hydroxyurea, and Diphenhydramine hcl        REVIEW OF SYSTEMS  Review of Systems   Constitutional:  Negative for chills and fever.   HENT:  Negative for ear pain and sore throat.    Respiratory:  Negative for cough and shortness of breath.    Cardiovascular:  Negative for chest pain and palpitations.   Gastrointestinal:  Positive for abdominal pain, nausea and vomiting.   Genitourinary:  Negative for dysuria and hematuria.   Musculoskeletal:  Negative for arthralgias and joint swelling.   Skin:  Negative for pallor and rash.   Neurological:  Negative for numbness and headaches.   Psychiatric/Behavioral:  Negative for confusion and hallucinations.           PHYSICAL " EXAM  ED Triage Vitals [08/13/23 0958]   Temp Heart Rate Resp BP SpO2   98.3 øF (36.8 øC) 84 16 117/64 98 %      Temp src Heart Rate Source Patient Position BP Location FiO2 (%)   Oral -- -- -- --       Physical Exam  Constitutional:       General: She is not in acute distress.     Appearance: She is well-developed.   HENT:      Head: Normocephalic and atraumatic.   Eyes:      Extraocular Movements: Extraocular movements intact.   Cardiovascular:      Rate and Rhythm: Normal rate and regular rhythm.      Heart sounds: Normal heart sounds.   Pulmonary:      Effort: Pulmonary effort is normal.      Breath sounds: Normal breath sounds.   Abdominal:      General: There is no distension.      Tenderness: There is abdominal tenderness in the epigastric area. There is no guarding.   Skin:     General: Skin is warm.   Neurological:      General: No focal deficit present.      Mental Status: She is alert and oriented to person, place, and time.   Psychiatric:         Mood and Affect: Mood normal.       Vital signs and nursing notes reviewed.          LAB RESULTS  Recent Results (from the past 24 hour(s))   Comprehensive Metabolic Panel    Collection Time: 08/13/23 10:23 AM    Specimen: Blood   Result Value Ref Range    Glucose 176 (H) 65 - 99 mg/dL    BUN 37 (H) 8 - 23 mg/dL    Creatinine 1.16 (H) 0.57 - 1.00 mg/dL    Sodium 135 (L) 136 - 145 mmol/L    Potassium 5.5 (H) 3.5 - 5.2 mmol/L    Chloride 92 (L) 98 - 107 mmol/L    CO2 22.0 22.0 - 29.0 mmol/L    Calcium 9.5 8.6 - 10.5 mg/dL    Total Protein 7.3 6.0 - 8.5 g/dL    Albumin 4.5 3.5 - 5.2 g/dL    ALT (SGPT) 34 (H) 1 - 33 U/L    AST (SGOT) 31 1 - 32 U/L    Alkaline Phosphatase 157 (H) 39 - 117 U/L    Total Bilirubin 0.5 0.0 - 1.2 mg/dL    Globulin 2.8 gm/dL    A/G Ratio 1.6 g/dL    BUN/Creatinine Ratio 31.9 (H) 7.0 - 25.0    Anion Gap 21.0 (H) 5.0 - 15.0 mmol/L    eGFR 46.3 (L) >60.0 mL/min/1.73   Lipase    Collection Time: 08/13/23 10:23 AM    Specimen: Blood   Result  Value Ref Range    Lipase 17 13 - 60 U/L   Respiratory Panel PCR w/COVID-19(SARS-CoV-2) [facility] In-House, NP Swab in UTM/VTM, 3-4 HR TAT - Swab, Nasopharynx    Collection Time: 08/13/23 10:24 AM    Specimen: Nasopharynx; Swab   Result Value Ref Range    ADENOVIRUS, PCR Not Detected Not Detected    Coronavirus 229E Not Detected Not Detected    Coronavirus HKU1 Not Detected Not Detected    Coronavirus NL63 Not Detected Not Detected    Coronavirus OC43 Not Detected Not Detected    COVID19 Not Detected Not Detected - Ref. Range    Human Metapneumovirus Not Detected Not Detected    Human Rhinovirus/Enterovirus Not Detected Not Detected    Influenza A PCR Not Detected Not Detected    Influenza B PCR Not Detected Not Detected    Parainfluenza Virus 1 Not Detected Not Detected    Parainfluenza Virus 2 Not Detected Not Detected    Parainfluenza Virus 3 Not Detected Not Detected    Parainfluenza Virus 4 Not Detected Not Detected    RSV, PCR Not Detected Not Detected    Bordetella pertussis pcr Not Detected Not Detected    Bordetella parapertussis PCR Not Detected Not Detected    Chlamydophila pneumoniae PCR Not Detected Not Detected    Mycoplasma pneumo by PCR Not Detected Not Detected   CBC Auto Differential    Collection Time: 08/13/23 10:50 AM    Specimen: Blood   Result Value Ref Range    WBC 51.08 (C) 3.40 - 10.80 10*3/mm3    RBC 2.36 (L) 3.77 - 5.28 10*6/mm3    Hemoglobin 7.8 (L) 12.0 - 15.9 g/dL    Hematocrit 22.4 (L) 34.0 - 46.6 %    MCV 94.9 79.0 - 97.0 fL    MCH 33.1 (H) 26.6 - 33.0 pg    MCHC 34.8 31.5 - 35.7 g/dL    RDW 18.4 (H) 12.3 - 15.4 %    RDW-SD 62.4 (H) 37.0 - 54.0 fl    MPV 12.2 (H) 6.0 - 12.0 fL    Platelets 305 140 - 450 10*3/mm3    nRBC 0.1 0.0 - 0.2 /100 WBC   Manual Differential    Collection Time: 08/13/23 10:50 AM    Specimen: Blood   Result Value Ref Range    Neutrophil % 90.0 (H) 42.7 - 76.0 %    Lymphocyte % 1.0 (L) 19.6 - 45.3 %    Monocyte % 4.0 (L) 5.0 - 12.0 %     Metamyelocyte % 2.0 (H) 0.0 - 0.0 %    Myelocyte % 3.0 (H) 0.0 - 0.0 %    Neutrophils Absolute 45.97 (H) 1.70 - 7.00 10*3/mm3    Lymphocytes Absolute 0.51 (L) 0.70 - 3.10 10*3/mm3    Monocytes Absolute 2.04 (H) 0.10 - 0.90 10*3/mm3    RBC Morphology Normal Normal    WBC Morphology Normal Normal    Platelet Morphology Normal Normal   Comprehensive Metabolic Panel    Collection Time: 08/13/23 11:53 AM    Specimen: Blood   Result Value Ref Range    Glucose 160 (H) 65 - 99 mg/dL    BUN 37 (H) 8 - 23 mg/dL    Creatinine 1.02 (H) 0.57 - 1.00 mg/dL    Sodium 138 136 - 145 mmol/L    Potassium 5.0 3.5 - 5.2 mmol/L    Chloride 97 (L) 98 - 107 mmol/L    CO2 23.8 22.0 - 29.0 mmol/L    Calcium 9.2 8.6 - 10.5 mg/dL    Total Protein 6.8 6.0 - 8.5 g/dL    Albumin 4.6 3.5 - 5.2 g/dL    ALT (SGPT) 31 1 - 33 U/L    AST (SGOT) 16 1 - 32 U/L    Alkaline Phosphatase 144 (H) 39 - 117 U/L    Total Bilirubin 0.5 0.0 - 1.2 mg/dL    Globulin 2.2 gm/dL    A/G Ratio 2.1 g/dL    BUN/Creatinine Ratio 36.3 (H) 7.0 - 25.0    Anion Gap 17.2 (H) 5.0 - 15.0 mmol/L    eGFR 54.0 (L) >60.0 mL/min/1.73   Urinalysis With Microscopic If Indicated (No Culture) - Urine, Clean Catch    Collection Time: 08/13/23 12:38 PM    Specimen: Urine, Clean Catch   Result Value Ref Range    Color, UA Yellow Yellow, Straw    Appearance, UA Clear Clear    pH, UA 5.5 5.0 - 8.0    Specific Gravity, UA 1.028 1.005 - 1.030    Glucose, UA Negative Negative    Ketones, UA Negative Negative    Bilirubin, UA Negative Negative    Blood, UA Negative Negative    Protein, UA Trace (A) Negative    Leuk Esterase, UA Negative Negative    Nitrite, UA Negative Negative    Urobilinogen, UA 0.2 E.U./dL 0.2 - 1.0 E.U./dL       Ordered the above labs and reviewed the results.        RADIOLOGY  CT Abdomen Pelvis With Contrast    Result Date: 8/13/2023  CT ABDOMEN PELVIS W CONTRAST-  INDICATIONS: Pain and vomiting  TECHNIQUE: Radiation dose reduction techniques were utilized, including automated  exposure control and exposure modulation based on body size. Enhanced ABDOMEN AND PELVIS CT  COMPARISON: 05/04/2023  FINDINGS:  The gallbladder is surgically absent.  A right renal cyst is seen. Left renal low density is noted, too small to characterize.  A subtle, nonspecific hypodensity in the spleen, 2 cm on axial image 28, stable lesions far back as 01/23/2023, could be further characterized with MRI as indicated, continued follow-up can characterize continued stability.  Mild chronic nonspecific bilateral adrenal gland thickening.  Otherwise unremarkable appearance of the liver, spleen, adrenal glands, pancreas, kidneys, bladder.  No bowel obstruction or abnormal bowel thickening is identified. Colonic diverticula are seen that do not appear inflamed. Mild hiatal hernia is present.  No free intraperitoneal gas or free fluid. Umbilical hernia of fat is present.  Scattered small mesenteric and para-aortic lymph nodes are seen that are not significant by size criteria.  Abdominal aorta is not aneurysmal. Aortic and other arterial calcifications are present.  The lung bases show minimal atelectasis. Faint patchy groundglass densities in the right lower lung could be developing edema or pneumonia, correlate clinically, follow-up chest imaging recommended.  Degenerative changes are seen in the spine. No acute fracture is identified.          1. Colonic diverticulosis. No acute inflammatory process of bowel is identified. Follow-up as indications persist. 2. No obstructive uropathy. Stable indeterminate splenic low density. 3. Faint patchy groundglass densities in the right lower lung could be developing edema or pneumonia, correlate clinically, follow-up chest imaging recommended.  This report was finalized on 8/13/2023 1:22 PM by Dr. Reilly Ritter M.D.       Ordered the above noted radiological studies. Reviewed by me in PACS.              MEDICATIONS GIVEN IN ER  Medications   sodium chloride 0.9 % flush 10 mL  (has no administration in time range)   ondansetron (ZOFRAN) injection 4 mg (4 mg Intravenous Given 8/13/23 1030)   famotidine (PEPCID) injection 20 mg (20 mg Intravenous Given 8/13/23 1030)   sodium chloride 0.9 % bolus 500 mL (0 mL Intravenous Stopped 8/13/23 1114)   morphine injection 2 mg (2 mg Intravenous Given 8/13/23 1107)   ondansetron (ZOFRAN) injection 4 mg (4 mg Intravenous Given 8/13/23 1107)   iopamidol (ISOVUE-300) 61 % injection 85 mL (85 mL Intravenous Given 8/13/23 1219)             MEDICAL DECISION MAKING, PROGRESS, and CONSULTS    All labs have been independently reviewed by me.  All radiology studies have been reviewed by me and I have also reviewed the radiology report.   EKG's independently viewed and interpreted by me.  Discussion below represents my analysis of pertinent findings related to patient's condition, differential diagnosis, treatment plan and final disposition.            Orders placed during this visit:  Orders Placed This Encounter   Procedures    Respiratory Panel PCR w/COVID-19(SARS-CoV-2) [facility] In-House, NP Swab in UTM/VTM, 3-4 HR TAT - Swab, Nasopharynx    CT Abdomen Pelvis With Contrast    Comprehensive Metabolic Panel    Lipase    Urinalysis With Microscopic If Indicated (No Culture) - Urine, Clean Catch    CBC Auto Differential    Comprehensive Metabolic Panel    Manual Differential    Insert Peripheral IV    CBC & Differential           Differential diagnosis:  Viral gastroenteritis, bowel obstruction, influenza      Independent interpretation of labs, radiology studies, and discussions with consultants:  ED Course as of 08/13/23 1427   Sun Aug 13, 2023   1059 Patient complaining of persistent nausea and pain.  Will order additional medication [MP]   1148 WBC(!!): 51.08 [MP]   1148 Hemoglobin(!): 7.8 [MP]   1149 BUN(!): 37 [MP]   1149 Creatinine(!): 1.16 [MP]   1225 BUN(!): 37 [MP]   1225 Creatinine(!): 1.02 [MP]   1226 CT abdomen and pelvis  independently interpreted by me as no bowel obstruction [MP]   1426 Spoke with Sade in the observation unit.  Reviewed patient presentation and ED findings.  She agrees to admit to the observation unit. [MP]      ED Course User Index  [MP] Hailee May PA-C       - Shared decision making: Spoke with patient and daughter at bedside.  She is tolerating p.o. but does have a jump in her serum creatinine.  Discussed that we could discharge her home with plan to orally hydrate or admit her to observation unit for hydration overnight.  Patient and daughter prefer admission for IV hydration as she has history of recurrent KARLI.    Additional orders considered but not ordered:  Abdominal ultrasound      Additional sources:    - Chronic or social conditions impacting care: Chronic anticoagulation          DIAGNOSIS  Final diagnoses:   Nausea and vomiting, unspecified vomiting type   KARLI (acute kidney injury)           Latest Documented Vital Signs:  As of 14:27 EDT  BP- 125/56 HR- 98 Temp- 98.3 øF (36.8 øC) (Oral) O2 sat- 98%              --    Please note that portions of this were completed with a voice recognition program.       Note Disclaimer: At [facility], we believe that sharing information builds trust and better relationships. You are receiving this note because you are receiving care at [facility] or recently visited. It is possible you will see health information before a provider has talked with you about it. This kind of information can be easy to misunderstand. To help you fully understand what it means for your health, we urge you to discuss this note with your provider.             Hailee May PA-C  08/13/23 142

## 2023-08-13 NOTE — ED TRIAGE NOTES
Patient to ED via EMS from [facility]. Patient c/o nausea and vomiting since yesterday morning.

## 2023-08-13 NOTE — ED NOTES
Nursing report ED to floor  Catherine Boyer  83 y.o.  female    HPI :   Chief Complaint   Patient presents with    Vomiting       Admitting doctor:   Jn Villarreal MD    Admitting diagnosis:   The primary encounter diagnosis was Nausea and vomiting, unspecified vomiting type. A diagnosis of KARLI (acute kidney injury) was also pertinent to this visit.    Code status:   Current Code Status       Date Active Code Status Order ID Comments User Context       Prior            Allergies:   Aspirin, Oxycodone, Hydroxyurea, and Diphenhydramine hcl    Isolation:   No active isolations    Intake and Output  No intake or output data in the 24 hours ending 08/13/23 1430    Weight:       08/13/23  1044   Weight: 65.8 kg (145 lb)       Most recent vitals:   Vitals:    08/13/23 1101 08/13/23 1201 08/13/23 1301 08/13/23 1331   BP: 122/66 129/69 125/56 120/64   Pulse: 92 92 98 95   Resp: 17  16 15   Temp:       TempSrc:       SpO2: 98% 94% 98% 93%   Weight:       Height:           Active LDAs/IV Access:   Lines, Drains & Airways       Active LDAs       Name Placement date Placement time Site Days    Peripheral IV 08/13/23 1030 Anterior;Right Hand 08/13/23  1030  Hand  less than 1                    Labs (abnormal labs have a star):   Labs Reviewed   COMPREHENSIVE METABOLIC PANEL - Abnormal; Notable for the following components:       Result Value    Glucose 176 (*)     BUN 37 (*)     Creatinine 1.16 (*)     Sodium 135 (*)     Potassium 5.5 (*)     Chloride 92 (*)     ALT (SGPT) 34 (*)     Alkaline Phosphatase 157 (*)     BUN/Creatinine Ratio 31.9 (*)     Anion Gap 21.0 (*)     eGFR 46.3 (*)     All other components within normal limits    Narrative:     GFR Normal >60  Chronic Kidney Disease <60  Kidney Failure <15    The GFR formula is only valid for adults with stable renal function between ages 18 and 70.   URINALYSIS W/ MICROSCOPIC IF INDICATED (NO CULTURE) - Abnormal; Notable for the following components:    Protein, UA Trace  (*)     All other components within normal limits    Narrative:     Urine microscopic not indicated.   CBC WITH AUTO DIFFERENTIAL - Abnormal; Notable for the following components:    WBC 51.08 (*)     RBC 2.36 (*)     Hemoglobin 7.8 (*)     Hematocrit 22.4 (*)     MCH 33.1 (*)     RDW 18.4 (*)     RDW-SD 62.4 (*)     MPV 12.2 (*)     All other components within normal limits   COMPREHENSIVE METABOLIC PANEL - Abnormal; Notable for the following components:    Glucose 160 (*)     BUN 37 (*)     Creatinine 1.02 (*)     Chloride 97 (*)     Alkaline Phosphatase 144 (*)     BUN/Creatinine Ratio 36.3 (*)     Anion Gap 17.2 (*)     eGFR 54.0 (*)     All other components within normal limits    Narrative:     GFR Normal >60  Chronic Kidney Disease <60  Kidney Failure <15    The GFR formula is only valid for adults with stable renal function between ages 18 and 70.   MANUAL DIFFERENTIAL - Abnormal; Notable for the following components:    Neutrophil % 90.0 (*)     Lymphocyte % 1.0 (*)     Monocyte % 4.0 (*)     Metamyelocyte % 2.0 (*)     Myelocyte % 3.0 (*)     Neutrophils Absolute 45.97 (*)     Lymphocytes Absolute 0.51 (*)     Monocytes Absolute 2.04 (*)     All other components within normal limits   RESPIRATORY PANEL PCR W/ COVID-19 (SARS-COV-2) [facility] IN-HOUSE, NP SWAB IN [facility], 3-4 HR TAT - Normal    Narrative:     In the setting of a positive respiratory panel with a viral infection PLUS a negative procalcitonin without other underlying concern for bacterial infection, consider observing off antibiotics or discontinuation of antibiotics and continue supportive care. If the respiratory panel is positive for atypical bacterial infection (Bordetella pertussis, Chlamydophila pneumoniae, or Mycoplasma pneumoniae), consider antibiotic de-escalation to target atypical bacterial infection.   LIPASE - Normal   CBC AND DIFFERENTIAL    Narrative:     The following orders were created for panel order CBC &  "Differential.  Procedure                               Abnormality         Status                     ---------                               -----------         ------                     CBC Auto Differential[[id number]]        Abnormal            Final result                 Please view results for these tests on the individual orders.       EKG:   No orders to display       Meds given in ED:   Medications   sodium chloride 0.9 % flush 10 mL (has no administration in time range)   ondansetron (ZOFRAN) injection 4 mg (4 mg Intravenous Given 8/13/23 1030)   famotidine (PEPCID) injection 20 mg (20 mg Intravenous Given 8/13/23 1030)   sodium chloride 0.9 % bolus 500 mL (0 mL Intravenous Stopped 8/13/23 1114)   morphine injection 2 mg (2 mg Intravenous Given 8/13/23 1107)   ondansetron (ZOFRAN) injection 4 mg (4 mg Intravenous Given 8/13/23 1107)   iopamidol (ISOVUE-300) 61 % injection 85 mL (85 mL Intravenous Given 8/13/23 1219)       Imaging results:  CT Abdomen Pelvis With Contrast    Result Date: 8/13/2023    1. Colonic diverticulosis. No acute inflammatory process of bowel is identified. Follow-up as indications persist. 2. No obstructive uropathy. Stable indeterminate splenic low density. 3. Faint patchy groundglass densities in the right lower lung could be developing edema or pneumonia, correlate clinically, follow-up chest imaging recommended.  This report was finalized on 8/13/2023 1:22 PM by Dr. Reilly Ritter M.D.       Ambulatory status:   - Wheelchair    Social issues:   Social History     Socioeconomic History    Marital status:    Tobacco Use    Smoking status: Former     Packs/day: 1.00     Years: 20.00     Pack years: 20.00     Types: Cigarettes     Passive exposure: Past    Smokeless tobacco: Never    Tobacco comments:     30  years ago   Vaping Use    Vaping Use: Never used   Substance and Sexual Activity    Alcohol use: Yes     Comment: \"rare\"    Drug use: Never    Sexual activity: Defer "       NIH Stroke Scale:       Sharif Jaime RN  08/13/23 14:30 EDT

## 2023-08-13 NOTE — PLAN OF CARE
Goal Outcome Evaluation:  Plan of Care Reviewed With: patient        Progress: no change  Outcome Evaluation: Patient is alert and oriented times 4. Patient is on 2 liters of oxygen. Patient admitted to observation for nausea and vomiting. Patient is receiving gentle IV hydration. Blood pressure is slightly low. Provider Camelia Varma PA-C reviewed lab results. Patient is assistx1 with activity. Patient had bandage to left leg from recent procedure. NSR on monitor. Incontinent of bladder. Moist area in groin area. Patient is on full liquid diet. Patient received medication for nausea.Patient turns self in the bed.

## 2023-08-14 ENCOUNTER — READMISSION MANAGEMENT (OUTPATIENT)
Dept: CALL CENTER | Facility: HOSPITAL | Age: 83
End: 2023-08-14
Payer: MEDICARE

## 2023-08-14 VITALS
WEIGHT: 147.9 LBS | RESPIRATION RATE: 18 BRPM | TEMPERATURE: 97.5 F | BODY MASS INDEX: 25.25 KG/M2 | HEART RATE: 105 BPM | SYSTOLIC BLOOD PRESSURE: 149 MMHG | HEIGHT: 64 IN | DIASTOLIC BLOOD PRESSURE: 66 MMHG | OXYGEN SATURATION: 97 %

## 2023-08-14 LAB
ANION GAP SERPL CALCULATED.3IONS-SCNC: 13.5 MMOL/L (ref 5–15)
BUN SERPL-MCNC: 31 MG/DL (ref 8–23)
BUN/CREAT SERPL: 30.7 (ref 7–25)
CALCIUM SPEC-SCNC: 8.9 MG/DL (ref 8.6–10.5)
CHLORIDE SERPL-SCNC: 99 MMOL/L (ref 98–107)
CO2 SERPL-SCNC: 24.5 MMOL/L (ref 22–29)
CREAT SERPL-MCNC: 1.01 MG/DL (ref 0.57–1)
DEPRECATED RDW RBC AUTO: 63.2 FL (ref 37–54)
EGFRCR SERPLBLD CKD-EPI 2021: 54.7 ML/MIN/1.73
ERYTHROCYTE [DISTWIDTH] IN BLOOD BY AUTOMATED COUNT: 17.6 % (ref 12.3–15.4)
GLUCOSE SERPL-MCNC: 86 MG/DL (ref 65–99)
HCT VFR BLD AUTO: 23.6 % (ref 34–46.6)
HGB BLD-MCNC: 7.8 G/DL (ref 12–15.9)
MCH RBC QN AUTO: 32.6 PG (ref 26.6–33)
MCHC RBC AUTO-ENTMCNC: 33.1 G/DL (ref 31.5–35.7)
MCV RBC AUTO: 98.7 FL (ref 79–97)
PLATELET # BLD AUTO: 313 10*3/MM3 (ref 140–450)
PMV BLD AUTO: 11.3 FL (ref 6–12)
POTASSIUM SERPL-SCNC: 4.4 MMOL/L (ref 3.5–5.2)
RBC # BLD AUTO: 2.39 10*6/MM3 (ref 3.77–5.28)
SODIUM SERPL-SCNC: 137 MMOL/L (ref 136–145)
WBC NRBC COR # BLD: 49.83 10*3/MM3 (ref 3.4–10.8)

## 2023-08-14 PROCEDURE — 85027 COMPLETE CBC AUTOMATED: CPT | Performed by: STUDENT IN AN ORGANIZED HEALTH CARE EDUCATION/TRAINING PROGRAM

## 2023-08-14 PROCEDURE — 80048 BASIC METABOLIC PNL TOTAL CA: CPT | Performed by: STUDENT IN AN ORGANIZED HEALTH CARE EDUCATION/TRAINING PROGRAM

## 2023-08-14 PROCEDURE — G0378 HOSPITAL OBSERVATION PER HR: HCPCS

## 2023-08-14 RX ADMIN — CLOPIDOGREL BISULFATE 75 MG: 75 TABLET, FILM COATED ORAL at 09:42

## 2023-08-14 RX ADMIN — PANTOPRAZOLE SODIUM 40 MG: 40 TABLET, DELAYED RELEASE ORAL at 09:42

## 2023-08-14 RX ADMIN — FUROSEMIDE 40 MG: 40 TABLET ORAL at 09:42

## 2023-08-14 RX ADMIN — ACETAMINOPHEN 650 MG: 325 TABLET ORAL at 10:31

## 2023-08-14 RX ADMIN — METOPROLOL SUCCINATE 25 MG: 25 TABLET, EXTENDED RELEASE ORAL at 09:42

## 2023-08-14 RX ADMIN — Medication 10 ML: at 09:54

## 2023-08-14 RX ADMIN — FAMOTIDINE 20 MG: 20 TABLET, FILM COATED ORAL at 09:42

## 2023-08-14 NOTE — PROGRESS NOTES
ED OBSERVATION PROGRESS/DISCHARGE SUMMARY    Date of Admission: 8/13/2023   LOS: 0 days   PCP: Kristi Moreau APRN    Final Diagnosis ***      Subjective     Hospital Outcome: ***    ROS:  General: no fevers, chills  Respiratory: no cough, dyspnea  Cardiovascular: no chest pain, palpitations  Abdomen: No abdominal pain, nausea, vomiting, or diarrhea  Neurologic: No focal weakness    Objective   Physical Exam:  I have reviewed the vital signs.  Temp:  [97.5 øF (36.4 øC)-99.5 øF (37.5 øC)] 97.5 øF (36.4 øC)  Heart Rate:  [78-98] 90  Resp:  [15-20] 20  BP: ()/(47-69) 108/48  General Appearance:    Alert, cooperative, no distress  Head:    Normocephalic, atraumatic  Eyes:    Sclerae anicteric  Neck:   Supple, no mass  Lungs: Clear to auscultation bilaterally, respirations unlabored  Heart: Regular rate and rhythm, S1 and S2 normal, no murmur, rub or gallop  Abdomen:  Soft, non-tender, bowel sounds active, nondistended  Extremities: No clubbing, cyanosis, or edema to lower extremities  Pulses:  2+ and symmetric in distal lower extremities  Skin: No rashes   Neurologic: Oriented x3, Normal strength to extremities    Results Review:    I have reviewed the labs, radiology results and diagnostic studies.    Results from last 7 days   Lab Units 08/14/23  0418   WBC 10*3/mm3 49.83*   HEMOGLOBIN g/dL 7.8*   HEMATOCRIT % 23.6*   PLATELETS 10*3/mm3 313     Results from last 7 days   Lab Units 08/14/23  0418 08/13/23  1153 08/13/23  1023   SODIUM mmol/L 137 138 135*   POTASSIUM mmol/L 4.4 5.0 5.5*   CHLORIDE mmol/L 99 97* 92*   CO2 mmol/L 24.5 23.8 22.0   BUN mg/dL 31* 37* 37*   CREATININE mg/dL 1.01* 1.02* 1.16*   CALCIUM mg/dL 8.9 9.2 9.5   BILIRUBIN mg/dL  --  0.5 0.5   ALK PHOS U/L  --  144* 157*   ALT (SGPT) U/L  --  31 34*   AST (SGOT) U/L  --  16 31   GLUCOSE mg/dL 86 160* 176*     Imaging Results (Last 24 Hours)       Procedure Component Value Units Date/Time    CT Abdomen Pelvis With Contrast [[id number]] Collected:  08/13/23 1314     Updated: 08/13/23 1325    Narrative:      CT ABDOMEN PELVIS W CONTRAST-     INDICATIONS: Pain and vomiting     TECHNIQUE: Radiation dose reduction techniques were utilized, including  automated exposure control and exposure modulation based on body size.  Enhanced ABDOMEN AND PELVIS CT     COMPARISON: 05/04/2023     FINDINGS:     The gallbladder is surgically absent.     A right renal cyst is seen. Left renal low density is noted, too small  to characterize.     A subtle, nonspecific hypodensity in the spleen, 2 cm on axial image 28,  stable lesions far back as 01/23/2023, could be further characterized  with MRI as indicated, continued follow-up can characterize continued  stability.     Mild chronic nonspecific bilateral adrenal gland thickening.     Otherwise unremarkable appearance of the liver, spleen, adrenal glands,  pancreas, kidneys, bladder.     No bowel obstruction or abnormal bowel thickening is identified. Colonic  diverticula are seen that do not appear inflamed. Mild hiatal hernia is  present.     No free intraperitoneal gas or free fluid. Umbilical hernia of fat is  present.     Scattered small mesenteric and para-aortic lymph nodes are seen that are  not significant by size criteria.     Abdominal aorta is not aneurysmal. Aortic and other arterial  calcifications are present.     The lung bases show minimal atelectasis. Faint patchy groundglass  densities in the right lower lung could be developing edema or  pneumonia, correlate clinically, follow-up chest imaging recommended.     Degenerative changes are seen in the spine. No acute fracture is  identified.             Impression:            1. Colonic diverticulosis. No acute inflammatory process of bowel is  identified. Follow-up as indications persist.  2. No obstructive uropathy. Stable indeterminate splenic low density.  3. Faint patchy groundglass densities in the right lower lung could be  developing edema or pneumonia,  correlate clinically, follow-up chest  imaging recommended.     This report was finalized on 8/13/2023 1:22 PM by Dr. Reilly Ritter M.D.               I have reviewed the medications.  ---------------------------------------------------------------------------------------------  Assessment & Plan   Assessment/Problem List    Intractable nausea and vomiting    KARLI (acute kidney injury)      Plan:***    Disposition: ***    Follow-up after Discharge: ***    This note will serve as a discharge summary/progress note***    Hailee May PA-C 08/14/23 07:21 EDT    I have worn appropriate PPE during this patient encounter, sanitized my hands both with entering and exiting patient's room.      *** minutes has been spent by [facility] providers in the care of this patient while under observation status

## 2023-08-14 NOTE — PROGRESS NOTES
.ED OBSERVATION PROGRESS/DISCHARGE SUMMARY    Date of Admission: 8/13/2023   LOS: 0 days   PCP: Kristi Moreau APRN      Subjective   Resting comfortably and in no acute distress    Hospital Outcome:   83-year-old female was seen and examined at bedside following admission to the observation unit due to intractable nausea vomiting and KARLI.  Laboratory evaluation in the ED shows mild KARLI with a creatinine of 1.16 that has improved down to 1.02 after gentle IVF.  CT abdomen negative for acute findings.  Patient has been able to tolerate food and fluids and has had normal bowel movement and urinate without difficulty.      Patient remains symptom-free today.  Labs have been improving throughout observation.  Will discharge with PCP follow-up.  Patient is otherwise well-appearing, hemodynamically stable, and therefore appropriate for discharge.      ROS:  General: no fevers, chills  Respiratory: no cough, dyspnea  Cardiovascular: no chest pain, palpitations  Abdomen: No abdominal pain, nausea, vomiting, or diarrhea  Neurologic: No focal weakness    Objective   Physical Exam:  I have reviewed the vital signs.  Temp:  [98.3 øF (36.8 øC)-99.5 øF (37.5 øC)] 99.5 øF (37.5 øC)  Heart Rate:  [78-98] 78  Resp:  [15-20] 20  BP: ()/(47-69) 92/47  General Appearance:    Alert, cooperative, no distress  Head:    Normocephalic, atraumatic  Eyes:    Sclerae anicteric  Neck:   Supple, no mass  Lungs: Clear to auscultation bilaterally, respirations unlabored  Heart: Regular rate and rhythm, S1 and S2 normal, no murmur, rub or gallop  Abdomen:  Soft, non-tender, bowel sounds active, nondistended  Extremities: No clubbing, cyanosis, or edema to lower extremities  Pulses:  2+ and symmetric in distal lower extremities  Skin: No rashes   Neurologic: Oriented x3, Normal strength to extremities    Results Review:    I have reviewed the labs, radiology results and diagnostic studies.    Results from last 7 days   Lab Units 08/13/23  1050    WBC 10*3/mm3 51.08*   HEMOGLOBIN g/dL 7.8*   HEMATOCRIT % 22.4*   PLATELETS 10*3/mm3 305     Results from last 7 days   Lab Units 08/13/23  1153 08/13/23  1023   SODIUM mmol/L 138 135*   POTASSIUM mmol/L 5.0 5.5*   CHLORIDE mmol/L 97* 92*   CO2 mmol/L 23.8 22.0   BUN mg/dL 37* 37*   CREATININE mg/dL 1.02* 1.16*   CALCIUM mg/dL 9.2 9.5   BILIRUBIN mg/dL 0.5 0.5   ALK PHOS U/L 144* 157*   ALT (SGPT) U/L 31 34*   AST (SGOT) U/L 16 31   GLUCOSE mg/dL 160* 176*     Imaging Results (Last 24 Hours)       Procedure Component Value Units Date/Time    CT Abdomen Pelvis With Contrast [[id number]] Collected: 08/13/23 1314     Updated: 08/13/23 1325    Narrative:      CT ABDOMEN PELVIS W CONTRAST-     INDICATIONS: Pain and vomiting     TECHNIQUE: Radiation dose reduction techniques were utilized, including  automated exposure control and exposure modulation based on body size.  Enhanced ABDOMEN AND PELVIS CT     COMPARISON: 05/04/2023     FINDINGS:     The gallbladder is surgically absent.     A right renal cyst is seen. Left renal low density is noted, too small  to characterize.     A subtle, nonspecific hypodensity in the spleen, 2 cm on axial image 28,  stable lesions far back as 01/23/2023, could be further characterized  with MRI as indicated, continued follow-up can characterize continued  stability.     Mild chronic nonspecific bilateral adrenal gland thickening.     Otherwise unremarkable appearance of the liver, spleen, adrenal glands,  pancreas, kidneys, bladder.     No bowel obstruction or abnormal bowel thickening is identified. Colonic  diverticula are seen that do not appear inflamed. Mild hiatal hernia is  present.     No free intraperitoneal gas or free fluid. Umbilical hernia of fat is  present.     Scattered small mesenteric and para-aortic lymph nodes are seen that are  not significant by size criteria.     Abdominal aorta is not aneurysmal. Aortic and other arterial  calcifications are present.     The lung  bases show minimal atelectasis. Faint patchy groundglass  densities in the right lower lung could be developing edema or  pneumonia, correlate clinically, follow-up chest imaging recommended.     Degenerative changes are seen in the spine. No acute fracture is  identified.             Impression:            1. Colonic diverticulosis. No acute inflammatory process of bowel is  identified. Follow-up as indications persist.  2. No obstructive uropathy. Stable indeterminate splenic low density.  3. Faint patchy groundglass densities in the right lower lung could be  developing edema or pneumonia, correlate clinically, follow-up chest  imaging recommended.     This report was finalized on 8/13/2023 1:22 PM by Dr. Reilly Ritter M.D.               I have reviewed the medications.  ---------------------------------------------------------------------------------------------  Assessment & Plan   Assessment/Problem List    Intractable nausea and vomiting    KARLI (acute kidney injury)      Plan:    Intractable nausea and vomiting  Acute kidney injury  -CT abdomen shows no acute findings, does note a faint patchy groundglass density in the right lower lung which could be a developing edema or pneumonia.  On previous CT chest imaging shows patient with history of atelectasis and bilateral pulmonary effusions.  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 Reinoso with hematology outpatient     Hypertension  -Continue home medications       Type 2 diabetes  -Continue Jardiance      Disposition: Home    Follow-up after Discharge: PCP    This note will serve as a discharge summary    ABEL Sams 08/14/23 01:27 EDT    I have worn appropriate PPE during this patient encounter, sanitized my hands both with entering and exiting patient's room.      40 minutes has been spent by [facility] providers in the care of this patient while under observation status

## 2023-08-14 NOTE — CASE MANAGEMENT/SOCIAL WORK
Discharge Planning Assessment  [facility]     Patient Name: Catherine Boyer  MRN: [id number]  Today's Date: 8/14/2023    Admit Date: 8/13/2023    Plan: Plans to return to [facility]/Jhonny Melton RN   Discharge Needs Assessment       Row Name 08/14/23 0929       Living Environment    People in Home alone    Current Living Arrangements assisted living facility    Potentially Unsafe Housing Conditions none    Primary Care Provided by other (see comments)  assist from facility staff    Provides Primary Care For no one    Family Caregiver if Needed child(margarette), adult    Family Caregiver Names daughter Shahana    Quality of Family Relationships supportive    Able to Return to Prior Arrangements yes       Resource/Environmental Concerns    Resource/Environmental Concerns none       Transition Planning    Patient/Family Anticipates Transition to home;other (see comments)  return to [facility]    Patient/Family Anticipated Services at Transition none    Transportation Anticipated family or friend will provide       Discharge Needs Assessment    Current Outpatient/Agency/Support Group assisted living facility    Equipment Currently Used at Home bath bench;grab bar;oxygen;rollator  O2 3L prn, supplied by [employer]    Concerns to be Addressed no discharge needs identified    Anticipated Changes Related to Illness none    Equipment Needed After Discharge none    Provided Post Acute Provider List? N/A    Provided Post Acute Provider Quality & Resource List? N/A                   Discharge Plan       Row Name 08/14/23 0931       Plan    Plan Plans to return to [facility] at [facility]/Jhonny Melton RN    Patient/Family in Agreement with Plan yes    Provided Post Acute Provider List? N/A    Provided Post Acute Provider Quality & Resource List? N/A    Plan Comments Spoke w/ patient at bedside w/ her permission. Introduced self and explained role. All info on  facesheet, including PCP as BLAIRE Moreau, verified. Lives at [facility]. Staff assists w/ ADLs. Uses bath bench, grab bars, rollator and O2 at 3L prn, supplied by [employer], at home. Denies need for any DME or community resources at d/c. Uses [facility] and [facility] on [facility] and is able to obtain and pay for meds. To return back to assisted living facility at d/c; family will transport; agreeable w/ plan. CM will continue to follow-SUNI Melton RN      Row Name 08/14/23 0855       Plan    Final Discharge Disposition Code 01 - home or self-care                  Continued Care and Services - Admitted Since 8/13/2023    Coordination has not been started for this encounter.       Selected Continued Care - Episodes Includes continued care and service providers with selected services from the active episodes listed below      Oncology Episode start date: 5/18/2022   There are no active outsourced providers for this episode.                 Selected Continued Care - Prior Encounters Includes continued care and service providers with selected services from prior encounters from 5/15/2023 to 8/14/2023      Discharged on 5/16/2023 Admission date: 5/3/2023 - Discharge disposition: Home-Health Care c      Home Medical Care       Service Provider Selected Services Address Phone Fax Patient Preferred    [facility] [address] [place] [address], [address], [facility] [address] [phone] --                          Expected Discharge Date and Time       Expected Discharge Date Expected Discharge Time    Aug 14, 2023            Demographic Summary       Row Name 08/14/23 0926       General Information    Admission Type observation    Arrived From emergency department    Required Notices Provided Observation Status Notice    Referral Source admission list    Reason for Consult discharge planning    Preferred Language English        Contact Information    Permission Granted to Share Info With                    Functional Status       Row Name 08/14/23 0928       Functional Status    Usual Activity Tolerance moderate    Current Activity Tolerance moderate       Functional Status, IADL    IADL Comments Lives in assisted living facility, so staff assists w/ all tasks listed above       Mental Status    General Appearance WDL WDL       Mental Status Summary    Recent Changes in Mental Status/Cognitive Functioning no changes                   Psychosocial       Row Name 08/14/23 0928       Behavior WDL    Behavior WDL WDL       Emotion Mood WDL    Emotion/Mood/Affect WDL WDL       Speech WDL    Speech WDL WDL       Perceptual State WDL    Perceptual State WDL WDL       Thought Process WDL    Thought Process WDL WDL       Intellectual Performance WDL    Intellectual Performance WDL WDL    Level of Consciousness Alert                   Abuse/Neglect    No documentation.                  Legal    No documentation.                  Substance Abuse    No documentation.                  Patient Forms    No documentation.                     Daina Melton RN

## 2023-08-14 NOTE — NURSING NOTE
THIS NURSE called Dahlia,  at patient's assisted living facility. Dahlia stated that it is ok for patient to return to facility.

## 2023-08-14 NOTE — PROGRESS NOTES
Pt presents to the ED c/o nausea and vomiting starting yesterday afternoon with some epigastric discomfort.  Has had some mild generalized body aches.  No fevers, chills, diarrhea, dysuria.  Currently on exam patient is feeling much improved, has been tolerating p.o., has had a normal bowel movement and urinating without difficulty.  Feels tired but otherwise no complaints     On exam,   General: No acute distress, nontoxic  HEENT: Mucous membranes moist, atraumatic, EOMI  Neck: Full ROM  Pulm: Symmetric chest rise, nonlabored, lungs CTAB  Cardiovascular: Regular rate and rhythm, intact distal pulses  GI: Soft, nontender, nondistended, no rebound, no guarding, bowel sounds present  MSK: Full ROM, no deformity  Skin: Warm, dry  Neuro: Awake, alert, oriented x 4, GCS 15, moving all extremities, no focal deficits  Psych: Calm, cooperative        Plan: B acute renal insufficiency with a creatinine of 1.16 today as compared to 0.67 on June 26, 2023, admitted for further hydration and monitoring.  Plan for ongoing hydration and monitoring, hopefully plan for discharge tomorrow.  Overall well-appearing in no acute distress, all questions and concerns addressed.       Attestation:  The VINNY and I have discussed this patient's history, physical exam, and treatment plan.  I have reviewed the documentation and personally had a face to face interaction with the patient. I affirm the documentation and agree with the treatment and plan.  The attached note describes my personal findings.

## 2023-08-15 ENCOUNTER — READMISSION MANAGEMENT (OUTPATIENT)
Dept: CALL CENTER | Facility: HOSPITAL | Age: 83
End: 2023-08-15
Payer: MEDICARE

## 2023-08-15 NOTE — OUTREACH NOTE
Medical Week 1 Survey      Flowsheet Row Responses   [facility] patient discharged from? [facility]   Does the patient have one of the following disease processes/diagnoses(primary or secondary)? Other   Week 1 attempt successful? Yes   Call start time 1528   Revoke Decline to participate   Call end time 1529   General alerts for this patient [facility]   Discharge diagnosis nausea/vomiting,  KARLI   Call end time 1529            Bailey ESQUEDA - Registered Nurse

## 2023-08-15 NOTE — OUTREACH NOTE
Prep Survey      Flowsheet Row Responses   [facility] facility patient discharged from? [facility]   Is LACE score < 7 ? No   Eligibility Readm Mgmt   Discharge diagnosis nausea/vomiting,  KARLI   Does the patient have one of the following disease processes/diagnoses(primary or secondary)? Other   Does the patient have Home health ordered? No   Is there a DME ordered? No   Comments regarding appointments call for apmt   General alerts for this patient [facility]   Prep survey completed? Yes            April MEZA - Registered Nurse

## 2023-08-28 ENCOUNTER — APPOINTMENT (OUTPATIENT)
Dept: GENERAL RADIOLOGY | Facility: HOSPITAL | Age: 83
End: 2023-08-28
Payer: MEDICARE

## 2023-08-28 ENCOUNTER — HOSPITAL ENCOUNTER (EMERGENCY)
Facility: HOSPITAL | Age: 83
Discharge: HOME | End: 2023-08-28
Attending: EMERGENCY MEDICINE | Admitting: EMERGENCY MEDICINE
Payer: MEDICARE

## 2023-08-28 ENCOUNTER — SPECIALTY PHARMACY (OUTPATIENT)
Dept: PHARMACY | Facility: HOSPITAL | Age: 83
End: 2023-08-28
Payer: MEDICARE

## 2023-08-28 ENCOUNTER — TELEPHONE (OUTPATIENT)
Dept: ONCOLOGY | Facility: CLINIC | Age: 83
End: 2023-08-28
Payer: MEDICARE

## 2023-08-28 VITALS
TEMPERATURE: 97.6 F | RESPIRATION RATE: 18 BRPM | HEIGHT: 64 IN | OXYGEN SATURATION: 95 % | SYSTOLIC BLOOD PRESSURE: 147 MMHG | HEART RATE: 82 BPM | BODY MASS INDEX: 25.1 KG/M2 | WEIGHT: 147 LBS | DIASTOLIC BLOOD PRESSURE: 61 MMHG

## 2023-08-28 DIAGNOSIS — R53.83 OTHER FATIGUE: Primary | ICD-10-CM

## 2023-08-28 DIAGNOSIS — D47.1 MYELOPROLIFERATIVE DISEASE: ICD-10-CM

## 2023-08-28 LAB
ALBUMIN SERPL-MCNC: 4.6 G/DL (ref 3.5–5.2)
ALBUMIN/GLOB SERPL: 1.6 G/DL
ALP SERPL-CCNC: 160 U/L (ref 39–117)
ALT SERPL W P-5'-P-CCNC: 31 U/L (ref 1–33)
ANION GAP SERPL CALCULATED.3IONS-SCNC: 13.7 MMOL/L (ref 5–15)
AST SERPL-CCNC: 30 U/L (ref 1–32)
BACTERIA UR QL AUTO: ABNORMAL /HPF
BILIRUB SERPL-MCNC: 0.3 MG/DL (ref 0–1.2)
BILIRUB UR QL STRIP: NEGATIVE
BUN SERPL-MCNC: 24 MG/DL (ref 8–23)
BUN/CREAT SERPL: 26.1 (ref 7–25)
CALCIUM SPEC-SCNC: 9 MG/DL (ref 8.6–10.5)
CHLORIDE SERPL-SCNC: 101 MMOL/L (ref 98–107)
CLARITY UR: ABNORMAL
CO2 SERPL-SCNC: 22.3 MMOL/L (ref 22–29)
COLOR UR: YELLOW
CREAT SERPL-MCNC: 0.92 MG/DL (ref 0.57–1)
DEPRECATED RDW RBC AUTO: 60.5 FL (ref 37–54)
EGFRCR SERPLBLD CKD-EPI 2021: 61.1 ML/MIN/1.73
ERYTHROCYTE [DISTWIDTH] IN BLOOD BY AUTOMATED COUNT: 17.4 % (ref 12.3–15.4)
GLOBULIN UR ELPH-MCNC: 2.9 GM/DL
GLUCOSE SERPL-MCNC: 181 MG/DL (ref 65–99)
GLUCOSE UR STRIP-MCNC: NEGATIVE MG/DL
HCT VFR BLD AUTO: 27.9 % (ref 34–46.6)
HGB BLD-MCNC: 9.3 G/DL (ref 12–15.9)
HGB UR QL STRIP.AUTO: NEGATIVE
HOLD SPECIMEN: NORMAL
HOLD SPECIMEN: NORMAL
HYALINE CASTS UR QL AUTO: ABNORMAL /LPF
KETONES UR QL STRIP: NEGATIVE
LEUKOCYTE ESTERASE UR QL STRIP.AUTO: ABNORMAL
LYMPHOCYTES # BLD MANUAL: 6.04 10*3/MM3 (ref 0.7–3.1)
LYMPHOCYTES NFR BLD MANUAL: 8 % (ref 5–12)
MAGNESIUM SERPL-MCNC: 2 MG/DL (ref 1.6–2.4)
MCH RBC QN AUTO: 32.2 PG (ref 26.6–33)
MCHC RBC AUTO-ENTMCNC: 33.3 G/DL (ref 31.5–35.7)
MCV RBC AUTO: 96.5 FL (ref 79–97)
METAMYELOCYTES NFR BLD MANUAL: 3 % (ref 0–0)
MONOCYTES # BLD: 4.39 10*3/MM3 (ref 0.1–0.9)
MYELOCYTES NFR BLD MANUAL: 2 % (ref 0–0)
NEUTROPHILS # BLD AUTO: 41.72 10*3/MM3 (ref 1.7–7)
NEUTROPHILS NFR BLD MANUAL: 76 % (ref 42.7–76)
NITRITE UR QL STRIP: NEGATIVE
PH UR STRIP.AUTO: 6 [PH] (ref 5–8)
PLAT MORPH BLD: NORMAL
PLATELET # BLD AUTO: 452 10*3/MM3 (ref 140–450)
PMV BLD AUTO: 10.8 FL (ref 6–12)
POTASSIUM SERPL-SCNC: 4.7 MMOL/L (ref 3.5–5.2)
PROT SERPL-MCNC: 7.5 G/DL (ref 6–8.5)
PROT UR QL STRIP: NEGATIVE
QT INTERVAL: 379 MS
QTC INTERVAL: 424 MS
RBC # BLD AUTO: 2.89 10*6/MM3 (ref 3.77–5.28)
RBC # UR STRIP: ABNORMAL /HPF
RBC MORPH BLD: NORMAL
REF LAB TEST METHOD: ABNORMAL
SODIUM SERPL-SCNC: 137 MMOL/L (ref 136–145)
SP GR UR STRIP: 1.01 (ref 1–1.03)
SQUAMOUS #/AREA URNS HPF: ABNORMAL /HPF
TROPONIN T SERPL HS-MCNC: 30 NG/L
TROPONIN T SERPL HS-MCNC: 33 NG/L
UROBILINOGEN UR QL STRIP: ABNORMAL
VARIANT LYMPHS NFR BLD MANUAL: 11 % (ref 19.6–45.3)
WBC # UR STRIP: ABNORMAL /HPF
WBC MORPH BLD: NORMAL
WBC NRBC COR # BLD: 54.9 10*3/MM3 (ref 3.4–10.8)
WHOLE BLOOD HOLD COAG: NORMAL
WHOLE BLOOD HOLD SPECIMEN: NORMAL
YEAST URNS QL MICRO: ABNORMAL /HPF

## 2023-08-28 PROCEDURE — 36415 COLL VENOUS BLD VENIPUNCTURE: CPT

## 2023-08-28 PROCEDURE — 85025 COMPLETE CBC W/AUTO DIFF WBC: CPT

## 2023-08-28 PROCEDURE — 81001 URINALYSIS AUTO W/SCOPE: CPT

## 2023-08-28 PROCEDURE — 71045 X-RAY EXAM CHEST 1 VIEW: CPT

## 2023-08-28 PROCEDURE — 84484 ASSAY OF TROPONIN QUANT: CPT | Performed by: PHYSICIAN ASSISTANT

## 2023-08-28 PROCEDURE — 83735 ASSAY OF MAGNESIUM: CPT

## 2023-08-28 PROCEDURE — 93005 ELECTROCARDIOGRAM TRACING: CPT

## 2023-08-28 PROCEDURE — 93010 ELECTROCARDIOGRAM REPORT: CPT | Performed by: INTERNAL MEDICINE

## 2023-08-28 PROCEDURE — 84484 ASSAY OF TROPONIN QUANT: CPT

## 2023-08-28 PROCEDURE — 85007 BL SMEAR W/DIFF WBC COUNT: CPT

## 2023-08-28 PROCEDURE — 80053 COMPREHEN METABOLIC PANEL: CPT

## 2023-08-28 PROCEDURE — 99284 EMERGENCY DEPT VISIT MOD MDM: CPT

## 2023-08-28 RX ORDER — SODIUM CHLORIDE 0.9 % (FLUSH) 0.9 %
10 SYRINGE (ML) INJECTION AS NEEDED
Status: DISCONTINUED | OUTPATIENT
Start: 2023-08-28 | End: 2023-08-28 | Stop reason: HOSPADM

## 2023-08-28 RX ADMIN — SODIUM CHLORIDE 500 ML: 9 INJECTION, SOLUTION INTRAVENOUS at 17:41

## 2023-08-28 NOTE — PROGRESS NOTES
Specialty Pharmacy Refill Coordination Note     Catherine is a 83 y.o. female contacted today regarding refills of  Jakafi specialty medication(s).    Reviewed and verified with patient:       Specialty medication(s) and dose(s) confirmed: yes  Jakafi 15 mg twice per day    Refill Questions      Flowsheet Row Most Recent Value   Changes to allergies? No   Changes to medications? No   New conditions since last clinic visit No  [Recent ED visit for nausea and vomiting]   Unplanned office visit, urgent care, ED, or hospital admission in the last 4 weeks  Yes  [ED visit on 8/13/23 due to N/V]   How does patient/caregiver feel medication is working? Good   Financial problems or insurance changes  No   Since the previous refill, were any specialty medication doses or scheduled injections missed or delayed?  Yes   If yes, please provide the amount Karen reports pt may have missed a few days   Why were doses missed? Hosital/ED visits   Does this patient require a clinical escalation to a pharmacist? No            Delivery Questions      Flowsheet Row Most Recent Value   Delivery method Other (Comment)  [Ship to home address-Ship 8/30 for delivery 8/31-$0 copay with insurance paying 100%-Address Confirmed]   Delivery address correct? Yes  [Ship to home address]   Delivery phone number [phone]-Karen, pts daughter   Preferred delivery time? AM   Number of medications in delivery 1   Medication being filled and delivered Jakafi   Doses left of specialty medications 7-10 days   Is there any medication that is due not being filled? No   Supplies needed? No supplies needed   Cooler needed? No   Do any medications need mixed or dated? No   Copay form of payment Payment plan already set up   Additional comments $0 copay with insurance covering 100%   Questions or concerns for the pharmacist? No   Explain any questions or concerns for the pharmacist N/A   Are any medications first time fills? No          Jakafi delivery  coordinated with Karen, pts daughter, for 8/31/2023 to pts home address. $0 copay with insurance paying 100%. Her last delivery was on 7/20/2023. No questions or concerns to report to MTM Team today.         Follow-up: 21 day(s)     Isi Mao, Pharmacy Technician  Specialty Pharmacy Technician

## 2023-08-28 NOTE — TELEPHONE ENCOUNTER
Provider: Dr Reinoso  Caller: Karen  Relationship to Patient: Daughter  Call Back Phone Number: [phone]  Reason for Call: No call needed, pt wants to make sure Dr Dyson know pt is being admitted to hospital. Stated pt may be getting transfusion

## 2023-08-28 NOTE — ED PROVIDER NOTES
Pt presents to the ED c/o increased fatigue and weakness today.  Has a history of chronic leukocytosis from a myeloproliferative disorder.  Denies any chest pain, shortness breath, cough, fevers, chills, vomiting, diarrhea.  Just felt more fatigued.     On exam,   General: No acute distress, nontoxic  HEENT: Mucous membranes moist, atraumatic, EOMI  Neck: Full ROM  Pulm: Symmetric chest rise, nonlabored, lungs CTAB  Cardiovascular: Regular rate and rhythm, intact distal pulses  GI: Soft, nontender, nondistended, no rebound, no guarding, bowel sounds present  MSK: Full ROM, no deformity  Skin: Warm, dry  Neuro: Awake, alert, oriented x 4, GCS 15, moving all extremities, no focal deficits  Psych: Calm, cooperative          Plan:   ED Course as of 08/29/23 1055   Mon Aug 28, 2023   1621 Hemoglobin(!): 9.3  7.8 two weeks ago [KA]   1622 WBC(!!): 54.90  Chronic myeloproliferative disorder, over the last several weeks to months it runs in the 40s to low 50s [KA]   1804 HS Troponin T(!): 33 [KA]   1921 Reassessed the patient, she is resting comfortably.  Troponin is flat, she has no chest pain or shortness of breath.  Work-up unremarkable.  She is not having any urinary symptoms, urinalysis not suggestive of acute infection, would not recommend treatment.  She is feels better after 500 cc fluid bolus, is eager for discharge.  I think she is safe for discharge, recommended follow-up with PCP within 48 hours for recheck, return to the ER if worse or any new symptoms develop or evolve.  She and family member at the bedside are agreeable with this plan and she stable for discharge. [KA]      ED Course User Index  [KA] Sulema Beckwith PA     Stable lab findings, no acute emergent issues or requirement for hospitalization.  She is comfortable to plan for discharge with outpatient follow-up.  ED return for worsening symptoms as needed     Attestation:  The VINNY and I have discussed this patient's history, physical exam, and  treatment plan.  I have reviewed the documentation and personally had a face to face interaction with the patient. I affirm the documentation and agree with the treatment and plan.  The attached note describes my personal findings.            Shahbaz Dubois MD  08/28/23 9376       Shahbaz Dubois MD  08/29/23 9098

## 2023-08-28 NOTE — DISCHARGE INSTRUCTIONS
Stay well-hydrated  Gentle activities as tolerated  Recheck with your primary care provider within 2 days

## 2023-08-31 NOTE — TELEPHONE ENCOUNTER
Pt's daughter called to report that for the past 5 days pt has been fatigued and weak.  Pt's denies any cp, lightheadedness.  I did confirmed with Dr. Barriga regarding the elevated Troponin and it is elevated but with a high sensitivity troponin panel, it is normal at baseline.   Pt 's daughter would like to wait until pt's UA comes back to see if she has a UTI.  If that is negative then she will give our office a call back.

## 2023-09-05 ENCOUNTER — TELEPHONE (OUTPATIENT)
Dept: ONCOLOGY | Facility: CLINIC | Age: 83
End: 2023-09-05
Payer: MEDICARE

## 2023-09-05 NOTE — TELEPHONE ENCOUNTER
Caller: Karan Boyer PO[facility]ADA/[facility]    Relationship: Emergency Contact    Best call back number: [phone]    What is the best time to reach you: ANY.LEAVE VM    Who are you requesting to speak with (clinical staff, provider,  specific staff member): SCHEDULING      What was the call regarding: PATIENT'S SON KARAN CALLED TO R/S APPT ON 9/18/23 AND IS REQUESTING FOR IT TO BE SCHEDULED ON 9/19/23 IF POSSIBLE. HE IS COMING FROM OUT OF TOWN TO TAKE HIS MOTHER TO THE APPT. HE ALSO SAID HE WOULD LIKE TO KEEP ORIGINAL APPT IF 9/19/23 IS NOT POSSIBLE .    Is it okay if the provider responds through [employer]: NO

## 2023-09-12 NOTE — PAYOR COMM NOTE
" Current Eye Medications:  Generic artificial tears as needed both eyes. Timolol twice a day both eyes, last took at 6 am. Latanoprost at bedtime both eyes, last took about 10 pm.      Subjective:  F.B. sensation started last night left eye. Dull pain last for couple minutes, got bad last night. Watering a lot and blood shot left eye. Vision is OK right eye. Vision comes and goes left eye.      Objective:  See Ophthalmology Exam.       Assessment:  Rakan Nolasco is a 74 year old male who presents with:   Encounter Diagnoses   Name Primary?    Borderline glaucoma with ocular hypertension, bilateral Intraocular pressure 21/19 today.     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 Female)       U/D FOR [id number]    CONTACT ZULEYKA HERNANDEZ  P# [phone]  F# [phone]          Date of Birth   1940    Social Security Number       Address   [address] [place] [address] [address] [place] [address]    Home Phone   [phone]    MRN   [id number]       Sabianism   Unknown    Marital Status                               Admission Date   5/3/23    Admission Type   Emergency    Admitting Provider   Jaimee Macias MD    Attending Provider   Kenton Lopez MD    Department, Room/Bed   [address], [address]       Discharge Date       Discharge Disposition       Discharge Destination                               Attending Provider: Kenton Lopez MD    Allergies: Aspirin, Oxycodone, Hydroxyurea, Diphenhydramine Hcl    Isolation: Spore, Contact   Infection: C.difficile (05/03/23), Rotavirus (05/03/23)   Code Status: No CPR    Ht: 154 cm (60.63\")   Wt: 60.6 kg (133 lb 9.6 oz)    Admission Cmt: None   Principal Problem: Rotavirus enteritis [A08.0]                 Active Insurance as of 5/3/2023     Primary Coverage     Payor Plan Insurance Group Employer/Plan Group    AETNA MEDICARE REPLACEMENT AETNA MEDICARE REPLACEMENT [id number]     Payor Plan Address Payor Plan Phone Number Payor Plan Fax Number Effective Dates    [address] [phone]  1/1/2022 - None Entered    [facility] [id number]       Subscriber Name Subscriber Birth Date Member ID       DARLENE BOYERCHARY WATERS 1940 [id number]                 Emergency Contacts      (Rel.) Home Phone Work Phone Mobile Phone    RAY BOYER (Daughter) [phone] -- [phone]    Karan Boyer [facility] (Son) -- -- [phone]    Karen Gutierrez (Daughter) -- -- [phone]    Greg Boyer (Son) -- -- [phone]    Meg Boyer (Brother) [phone] -- --    MEG BANKS (Brother) -- -- [phone]               Physician Progress " Notes (last 48 hours)      Kenton Lopez MD at 23 1237              Name: Catherine Boyer ADMIT: 5/3/2023   : 1940  PCP: Kristi Moreau APRN    MRN: [id number] LOS: 10 days   AGE/SEX: 83 y.o. female  ROOM: [facility]     Subjective   Subjective   Patient alert and talking today.  She feels a little constipated, says it has been 2 days since her last BM but nurse states that she did have a small 1 last evening    Review of Systems    Objective   Objective   Vital Signs  Temp:  [97.7 °F (36.5 °C)-98.4 °F (36.9 °C)] 98.1 °F (36.7 °C)  Heart Rate:  [68-76] 75  Resp:  [16-18] 18  BP: (115-127)/(49-81) 116/49  SpO2:  [98 %-99 %] 99 %  on  Flow (L/min):  [3] 3;   Device (Oxygen Therapy): nasal cannula  Body mass index is 27.03 kg/m².  Physical Exam  Vitals and nursing note reviewed.   Constitutional:       General: She is not in acute distress.     Appearance: She is not ill-appearing.   Eyes:      General: No scleral icterus.  Cardiovascular:      Rate and Rhythm: Normal rate and regular rhythm.      Heart sounds: No murmur heard.  Pulmonary:      Effort: Pulmonary effort is normal. No respiratory distress.      Breath sounds: Normal breath sounds.   Abdominal:      General: There is no distension.      Palpations: Abdomen is soft.      Tenderness: There is no abdominal tenderness.   Musculoskeletal:      Right lower leg: No edema.      Left lower leg: No edema.   Skin:     General: Skin is warm and dry.   Neurological:      Mental Status: She is alert and oriented to person, place, and time.   Psychiatric:         Mood and Affect: Mood normal.       Results Review     I reviewed the patient's new clinical results.  Results from last 7 days   Lab Units 23  0512 23  0449 23  0332 23  0330   WBC 10*3/mm3 46.33* 39.98* 42.83* 32.41*   HEMOGLOBIN g/dL 8.5* 8.3* 8.5* 8.1*   PLATELETS 10*3/mm3 259 228 234 192     Results from last 7 days   Lab Units 23  0512 23  05/13/23 0449 05/12/23 0332 05/11/23  0330   SODIUM mmol/L 138  --  138 139 142   POTASSIUM mmol/L 4.9 4.5 3.2* 3.5 3.6   CHLORIDE mmol/L 100  --  96* 97* 100   CO2 mmol/L 27.1  --  30.0* 31.2* 31.0*   BUN mg/dL 15  --  13 14 13   CREATININE mg/dL 0.53*  --  0.50* 0.64 0.58   GLUCOSE mg/dL 92  --  106* 100* 89   EGFR mL/min/1.73 90.8  --  92.0 86.7 88.8       Results from last 7 days   Lab Units 05/14/23 0512 05/13/23 0449 05/12/23 0332 05/11/23  0330   CALCIUM mg/dL 9.1 8.5* 8.6 8.3*       No results found for: HGBA1C, POCGLU    No radiology results for the last day  I have personally reviewed all medications:  Scheduled Medications  apixaban, 5 mg, Oral, BID  atorvastatin, 80 mg, Oral, Nightly  cholestyramine, 1 packet, Oral, Q12H  clopidogrel, 75 mg, Oral, Daily  famotidine, 20 mg, Oral, Daily  furosemide, 40 mg, Oral, Daily  hydrocortisone-bacitracin-zinc oxide-nystatin, 1 application, Topical, BID  latanoprost, 1 drop, Both Eyes, Nightly  metoprolol succinate XL, 25 mg, Oral, BID  Petrolatum, 1 application, Topical, Q24H  ruxolitinib, 15 mg, Oral, BID  saccharomyces boulardii, 250 mg, Oral, BID  sertraline, 100 mg, Oral, Daily  sodium bicarbonate, 650 mg, Oral, TID    Infusions   Diet  Diet: Regular/House Diet, Gastrointestinal Diets; Low Irritant; Texture: Regular Texture (IDDSI 7); Fluid Consistency: Thin (IDDSI 0)    I have personally reviewed:  [x]  Laboratory   [x]  Microbiology   [x]  Radiology   [x]  EKG/Telemetry  [x]  Cardiology/Vascular   []  Pathology    []  Records      Assessment/Plan     Active Hospital Problems    Diagnosis  POA   • Moderate Malnutrition (HCC) [E44.0]  Yes   • 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 [Z22.1]  Not Applicable   • Acute on chronic diastolic heart failure [I50.33]  No   • Hypertension [I10]  Yes   • CAD (coronary artery disease) [I25.10]  Yes   • Myeloproliferative disorder [D47.1]  Yes   • Paroxysmal atrial fibrillation [I48.0]  Yes   • Type 2 diabetes mellitus with  circulatory disorder, 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 female admitted with Rotavirus enteritis.    Rotavirus Enteritis  -Resolved.  Patient now complaining of constipation, may relate to Questran use which I will DC.  Will allow stool softener as needed but would not get overly aggressive with bowel regimen  - Chronic diarrhea prior to her acute illness-as above  - Cdiff PCR is positive with a negative antigen, indicating colonization rather than infection-no specific treatment for this is indicated     Chest Pain/Dyspnea/Acute on Chronic Diastolic CHF/CAD  - CP and dyspnea resolved; troponin elevation flat and non-specific  - volume status improved with IV lasix, now on PO      PAF  - rate controlled, continue metoprolol  - continue AC with eliquis     Type 2 DM  - BG acceptable, no need for insulin coverage at this time     Myeloproliferative Disorder  - has chronically elevated WBC and anemia. Near baseline  - on Jakafi  - follows with Dr. Reinoso of [facility]     Abnormal UA  - looks contaminated  - no UTI symptoms and clinically improving without antibiotics  - no specific treatment for this is indicated     Eliquis (home med) for DVT prophylaxis.  DNR.  Dispo: Await decision on appeal for dispo plan. Stable      Kenton Lopez MD  [facility]  23  12:37 EDT      Electronically signed by Kenton Lopez MD at 23 1243     Kenton Lopez MD at 23 0927              Name: Catherine Boyer ADMIT: 5/3/2023   : 1940  PCP: Kristi Moreau APRN    MRN: [id number] LOS: 9 days   AGE/SEX: 83 y.o. female  ROOM: [facility]      Subjective   Subjective   SLeeping and did not awaken when I came around and said her name    Review of Systems    Objective   Objective   Vital Signs  Temp:  [98.2 °F (36.8 °C)-98.6 °F (37 °C)] 98.4 °F (36.9 °C)  Heart Rate:  [69-82] 76  Resp:  [18] 18  BP: (108-134)/(55-81) 122/81  SpO2:  [90 %-98 %] 98 %  on  Flow (L/min):  [2-3] 3;   Device (Oxygen Therapy): nasal cannula  Body mass index is 26.31 kg/m².  Physical Exam  Vitals and nursing note reviewed.   Constitutional:       Appearance: She is not ill-appearing.      Comments: resting   Cardiovascular:      Rate and Rhythm: Normal rate.   Pulmonary:      Effort: Pulmonary effort is normal.   Skin:     Findings: No rash.       Results Review     I reviewed the patient's new clinical results.  Results from last 7 days   Lab Units 05/13/23  0449 05/12/23  0332 05/11/23  0330 05/10/23  0603   WBC 10*3/mm3 39.98* 42.83* 32.41* 37.28*   HEMOGLOBIN g/dL 8.3* 8.5* 8.1* 8.8*   PLATELETS 10*3/mm3 228 234 192 181     Results from last 7 days   Lab Units 05/13/23  0449 05/12/23  0332 05/11/23  0330 05/10/23  0603   SODIUM mmol/L 138 139 142 140   POTASSIUM mmol/L 3.2* 3.5 3.6 4.0   CHLORIDE mmol/L 96* 97* 100 100   CO2 mmol/L 30.0* 31.2* 31.0* 31.0*   BUN mg/dL 13 14 13 10   CREATININE mg/dL 0.50* 0.64 0.58 0.51*   GLUCOSE mg/dL 106* 100* 89 84   EGFR mL/min/1.73 92.0 86.7 88.8 91.6       Results from last 7 days   Lab Units 05/13/23  0449 05/12/23  0332 05/11/23  0330 05/10/23  0603   CALCIUM mg/dL 8.5* 8.6 8.3* 9.0       No results found for: HGBA1C, POCGLU    No radiology results for the last day  I have personally reviewed all medications:  Scheduled Medications  apixaban, 5 mg, Oral, BID  atorvastatin, 80 mg, Oral, Nightly  cholestyramine, 1 packet, Oral, Q12H  clopidogrel, 75 mg, Oral, Daily  famotidine, 20 mg, Oral, Daily  furosemide, 40 mg, Oral, Daily  hydrocortisone-bacitracin-zinc oxide-nystatin, 1 application, Topical, BID  latanoprost, 1 drop, Both Eyes,  Nightly  metoprolol succinate XL, 25 mg, Oral, BID  Petrolatum, 1 application, Topical, Q24H  ruxolitinib, 15 mg, Oral, BID  saccharomyces boulardii, 250 mg, Oral, BID  sertraline, 100 mg, Oral, Daily  sodium bicarbonate, 650 mg, Oral, TID    Infusions   Diet  Diet: Regular/House Diet, Gastrointestinal Diets; Low Irritant; Texture: Regular Texture (IDDSI 7); Fluid Consistency: Thin (IDDSI 0)    I have personally reviewed:  [x]  Laboratory   [x]  Microbiology   [x]  Radiology   [x]  EKG/Telemetry  [x]  Cardiology/Vascular   []  Pathology    []  Records      Assessment/Plan     Active Hospital Problems    Diagnosis  POA   • **Rotavirus enteritis [A08.0]  Yes   • Moderate Malnutrition (HCC) [E44.0]  Yes   • 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 [Z22.1]  Not Applicable   • KARLI (acute kidney injury) [N17.9]  Yes   • Acute renal failure, unspecified acute renal failure type [N17.9]  Yes   • Epigastric abdominal pain [R10.13]  Yes   • Acute on chronic diastolic heart failure [I50.33]  No   • Hypertension [I10]  Yes   • CAD (coronary artery disease) [I25.10]  Yes   • Myeloproliferative disorder [D47.1]  Yes   • Paroxysmal atrial fibrillation [I48.0]  Yes   • Type 2 diabetes mellitus with circulatory disorder, 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       83 y.o. female admitted with Rotavirus enteritis.    Rotavirus Enteritis  - improving overall but had chronic diarrhea prior to her acute illness-this is also improving with her new regimen  - continue low irritant diet, cholestyramine, and PRN imodium  - Cdiff PCR is positive with a negative antigen, indicating colonization rather than infection-no specific treatment for this is indicated     KARLI  - secondary to above, resolved with IVF     Chest Pain/Dyspnea/Acute on Chronic Diastolic CHF/CAD  - CP and dyspnea resolved; troponin elevation flat and non-specific  - volume status improved with IV  lasix, now on PO      PAF  - rate controlled, continue metoprolol  - continue AC with eliquis     Type 2 DM  - BG acceptable, no need for insulin coverage at this time     Myeloproliferative Disorder  - has chronically elevated WBC and anemia. Near baseline  - on Jakafi  - follows with Dr. Reinoso of [facility]     Abnormal UA  - looks contaminated  - no UTI symptoms and clinically improving without antibiotics  - no specific treatment for this is indicated     Eliquis (home med) for DVT prophylaxis.  DNR.  Dispo: Await decision on appeal for dispo plan. Stable    Above info reviewed and edited as appropriate.      Kenton Lopez MD  [facility]  05/13/23  18:57 EDT      Electronically signed by Kenton Lopez MD at 05/13/23 1906       Consult Notes (last 48 hours)  Notes from 05/13/23 0914 through 05/15/23 0914   No notes of this type exist for this encounter.

## 2023-09-19 ENCOUNTER — OFFICE VISIT (OUTPATIENT)
Dept: ONCOLOGY | Facility: CLINIC | Age: 83
End: 2023-09-19
Payer: MEDICARE

## 2023-09-19 ENCOUNTER — SPECIALTY PHARMACY (OUTPATIENT)
Dept: ONCOLOGY | Facility: HOSPITAL | Age: 83
End: 2023-09-19
Payer: MEDICARE

## 2023-09-19 ENCOUNTER — LAB (OUTPATIENT)
Dept: LAB | Facility: HOSPITAL | Age: 83
End: 2023-09-19
Payer: MEDICARE

## 2023-09-19 VITALS
BODY MASS INDEX: 25.86 KG/M2 | SYSTOLIC BLOOD PRESSURE: 134 MMHG | TEMPERATURE: 98.4 F | RESPIRATION RATE: 18 BRPM | DIASTOLIC BLOOD PRESSURE: 73 MMHG | WEIGHT: 151.5 LBS | HEIGHT: 64 IN | OXYGEN SATURATION: 90 % | HEART RATE: 100 BPM

## 2023-09-19 DIAGNOSIS — D53.9 MACROCYTIC ANEMIA: ICD-10-CM

## 2023-09-19 DIAGNOSIS — D47.1 MYELOPROLIFERATIVE DISORDER: Primary | ICD-10-CM

## 2023-09-19 DIAGNOSIS — D47.1 MYELOPROLIFERATIVE DISORDER: ICD-10-CM

## 2023-09-19 LAB
ALBUMIN SERPL-MCNC: 4 G/DL (ref 3.5–5.2)
ALBUMIN/GLOB SERPL: 1.5 G/DL
ALP SERPL-CCNC: 126 U/L (ref 39–117)
ALT SERPL W P-5'-P-CCNC: 15 U/L (ref 1–33)
ANION GAP SERPL CALCULATED.3IONS-SCNC: 19.7 MMOL/L (ref 5–15)
AST SERPL-CCNC: 15 U/L (ref 1–32)
BASOPHILS # BLD AUTO: 0.4 10*3/MM3 (ref 0–0.2)
BASOPHILS NFR BLD AUTO: 0.8 % (ref 0–1.5)
BILIRUB SERPL-MCNC: 0.5 MG/DL (ref 0–1.2)
BUN SERPL-MCNC: 22 MG/DL (ref 8–23)
BUN/CREAT SERPL: 22.7 (ref 7–25)
CALCIUM SPEC-SCNC: 8.7 MG/DL (ref 8.6–10.5)
CHLORIDE SERPL-SCNC: 100 MMOL/L (ref 98–107)
CO2 SERPL-SCNC: 23.3 MMOL/L (ref 22–29)
CREAT SERPL-MCNC: 0.97 MG/DL (ref 0.6–1.1)
DEPRECATED RDW RBC AUTO: 71.8 FL (ref 37–54)
EGFRCR SERPLBLD CKD-EPI 2021: 57.4 ML/MIN/1.73
EOSINOPHIL # BLD AUTO: 0.46 10*3/MM3 (ref 0–0.4)
EOSINOPHIL NFR BLD AUTO: 0.9 % (ref 0.3–6.2)
ERYTHROCYTE [DISTWIDTH] IN BLOOD BY AUTOMATED COUNT: 20.5 % (ref 12.3–15.4)
FERRITIN SERPL-MCNC: 81.8 NG/ML (ref 13–150)
GLOBULIN UR ELPH-MCNC: 2.6 GM/DL
GLUCOSE SERPL-MCNC: 142 MG/DL (ref 65–99)
HCT VFR BLD AUTO: 25 % (ref 34–46.6)
HGB BLD-MCNC: 8 G/DL (ref 12–15.9)
HGB RETIC QN AUTO: 31.4 PG (ref 29.8–36.1)
IMM GRANULOCYTES # BLD AUTO: 6.32 10*3/MM3 (ref 0–0.05)
IMM GRANULOCYTES NFR BLD AUTO: 11.9 % (ref 0–0.5)
IMM RETICS NFR: 35.2 % (ref 3–15.8)
IRON 24H UR-MRATE: 22 MCG/DL (ref 37–145)
IRON SATN MFR SERPL: 5 % (ref 20–50)
LYMPHOCYTES # BLD AUTO: 5.22 10*3/MM3 (ref 0.7–3.1)
LYMPHOCYTES NFR BLD AUTO: 9.9 % (ref 19.6–45.3)
MCH RBC QN AUTO: 32.4 PG (ref 26.6–33)
MCHC RBC AUTO-ENTMCNC: 32 G/DL (ref 31.5–35.7)
MCV RBC AUTO: 101.2 FL (ref 79–97)
MONOCYTES # BLD AUTO: 8.83 10*3/MM3 (ref 0.1–0.9)
MONOCYTES NFR BLD AUTO: 16.7 % (ref 5–12)
NEUTROPHILS NFR BLD AUTO: 31.75 10*3/MM3 (ref 1.7–7)
NEUTROPHILS NFR BLD AUTO: 59.8 % (ref 42.7–76)
NRBC BLD AUTO-RTO: 0.2 /100 WBC (ref 0–0.2)
PLATELET # BLD AUTO: 374 10*3/MM3 (ref 140–450)
PMV BLD AUTO: 11.4 FL (ref 6–12)
POTASSIUM SERPL-SCNC: 3.8 MMOL/L (ref 3.5–5.2)
PROT SERPL-MCNC: 6.6 G/DL (ref 6–8.5)
RBC # BLD AUTO: 2.47 10*6/MM3 (ref 3.77–5.28)
RETICS # AUTO: 0.11 10*6/MM3 (ref 0.02–0.13)
RETICS/RBC NFR AUTO: 4.51 % (ref 0.7–1.9)
SODIUM SERPL-SCNC: 143 MMOL/L (ref 136–145)
TIBC SERPL-MCNC: 445 MCG/DL (ref 298–536)
TRANSFERRIN SERPL-MCNC: 318 MG/DL (ref 200–360)
WBC NRBC COR # BLD: 52.98 10*3/MM3 (ref 3.4–10.8)

## 2023-09-19 PROCEDURE — 82728 ASSAY OF FERRITIN: CPT

## 2023-09-19 PROCEDURE — 80053 COMPREHEN METABOLIC PANEL: CPT

## 2023-09-19 PROCEDURE — 84466 ASSAY OF TRANSFERRIN: CPT

## 2023-09-19 PROCEDURE — 85025 COMPLETE CBC W/AUTO DIFF WBC: CPT

## 2023-09-19 PROCEDURE — 85046 RETICYTE/HGB CONCENTRATE: CPT

## 2023-09-19 PROCEDURE — 36415 COLL VENOUS BLD VENIPUNCTURE: CPT

## 2023-09-19 PROCEDURE — 83540 ASSAY OF IRON: CPT

## 2023-09-19 RX ORDER — LEVOFLOXACIN 250 MG/1
TABLET ORAL
COMMUNITY
Start: 2023-09-01

## 2023-09-19 NOTE — PROGRESS NOTES
Specialty Pharmacy Patient Management Program  Oncology 6-Month Clinical Assessment       Catherine Boyer is a 83 y.o. female with myelofibrosis seen today to assess adherence and side effects.    Regimen: Jakafi 15mg twice daily     Reason for Outreach: Routine medication check-in .         Goals Addressed Today    None       Medication Assessment:  Medication Assessment   Side Effects: recent skin lesion (has already been removed)   Administration: Patient is taking every day at the same time  and twice daily.   Patient can self administer medications: yes  Medication Follow-Up Plan: Next clinical assessment  Lab Review: The labs listed below have been reviewed. The following labs are outside of normal limits: Glucose (181); WBC (53); Hgb (8). No dose adjustments are needed for the oral specialty medication(s) based on the labs.     Lab Results   Component Value Date    GLUCOSE 181 (H) 08/28/2023    CALCIUM 9.0 08/28/2023     08/28/2023    K 4.7 08/28/2023    CO2 22.3 08/28/2023     08/28/2023    BUN 24 (H) 08/28/2023    CREATININE 0.92 08/28/2023    EGFRIFAFRI 81 07/09/2021    EGFRIFNONA 71 07/09/2021    BCR 26.1 (H) 08/28/2023    ANIONGAP 13.7 08/28/2023     Lab Results   Component Value Date    WBC 52.98 (H) 09/19/2023    RBC 2.47 (L) 09/19/2023    HGB 8.0 (L) 09/19/2023    HCT 25.0 (L) 09/19/2023    .2 (H) 09/19/2023    MCH 32.4 09/19/2023    MCHC 32.0 09/19/2023    RDW 20.5 (H) 09/19/2023    RDWSD 71.8 (H) 09/19/2023    MPV 11.4 09/19/2023     09/19/2023    NEUTRORELPCT 59.8 09/19/2023    LYMPHORELPCT 9.9 (L) 09/19/2023    MONORELPCT 16.7 (H) 09/19/2023    EOSRELPCT 0.9 09/19/2023    BASORELPCT 0.8 09/19/2023    AUTOIGPER 11.9 (H) 09/19/2023    NEUTROABS 31.75 (H) 09/19/2023    LYMPHSABS 5.22 (H) 09/19/2023    MONOSABS 8.83 (H) 09/19/2023    EOSABS 0.46 (H) 09/19/2023    BASOSABS 0.40 (H) 09/19/2023    AUTOIGNUM 6.32 (H) 09/19/2023    NRBC 0.2 09/19/2023     Drug-drug  interactions  Completed medication reconciliation today to assess for drug interactions. Patient denies starting or stopping any medications.    Assessed medication list for interactions, no significant drug interactions noted.   Advised patient to call the clinic if any new medications are started so we can assess for drug-drug interactions.  Drug-food interactions discussed:  none  Vaccines are coordinated by the patient's oncologist and primary care provider.    Allergies  Known allergies and reactions were discussed with the patient. The patient's chart has been reviewed for allergy information and updated as necessary.   Allergies   Allergen Reactions    Aspirin Unknown - Low Severity    Oxycodone Unknown - Low Severity    Hydroxyurea Other (See Comments)     Her hemoglobin drop and was stop in February 2022 and start taking Jakafi    Diphenhydramine Hcl Anxiety and Unknown (See Comments)     Benadryl IVP        Hospitalizations and Urgent Care Visits Since Last Assessment:  Unplanned hospitalizations or inpatient admissions: recent admission for diverticulitis, now resolved  ED Visits: recent visit for fatigue, feeling better now  Urgent Office Visits: none    Adherence Assessment:   Patient reports missing one dose in the past 6 months.    Quality of Life Assessment:     -- Quality of Life: 8/10    Financial Assessment:  Medication availability/affordability: Patient has had no issues obtaining medication from pharmacy.    Attestation     I attest the patient was actively involved in and has agreed to the above plan of care.  If the prescribed therapy is at any point deemed not appropriate based on the current or future assessments, a consultation will be initiated with the patient's specialty care provider to determine the best course of action. The revised plan of therapy will be documented along with any required assessments and/or additional patient education provided.       All questions addressed and  patient had no additional concerns. Patient has pharmacy contact information.    Yara Ornelas, Pharmacy Student     Lidia Frazier, PharmD, BCPS    9/19/2023  16:13 EDT

## 2023-09-19 NOTE — PROGRESS NOTES
"[facility] GROUP OUTPATIENT FOLLOW UP CLINIC VISIT    REASON FOR FOLLOW-UP:    Myelofibrosis, on Jakafi    HISTORY OF PRESENT ILLNESS:  Catheirne Boyer is a 83 y.o. female with the above-mentioned she returns today for follow-up.    Jakafi 15 mg twice daily continues.    She has been a little bit more fatigued over the past few days but is feeling better today.  The wound on her left lower extremity is slowly healing.  The wound on her chest is looking a lot better and nearly completely healed.    REVIEW OF SYSTEMS:  As per HPI    PHYSICAL EXAMINATION:    Vitals:    09/19/23 1559   BP: 134/73   Pulse: 100   Resp: 18   Temp: 98.4 °F (36.9 °C)   TempSrc: Temporal   SpO2: 90%   Weight: 68.7 kg (151 lb 8 oz)   Height: 162.6 cm (64.02\")   PainSc: 0-No pain            General:  No acute distress, awake, alert and oriented.  Ambulatory with a rolling walker.  Skin: Left lower extremity bandage in place  HEENT:  Normocephalic/atraumatic.  Chest:  Normal respiratory effort.  Lungs clear to auscultation bilaterally.  Heart: Regular rate and rhythm  Extremities:  No visible clubbing, cyanosis, or edema  Neuro/psych:  Grossly nonfocal.  Normal mood and affect.      DIAGNOSTIC DATA:  Ferritin (09/19/2023 15:42)  Iron Profile (09/19/2023 15:42)  Retic With IRF & RET-He (09/19/2023 15:42)  Comprehensive Metabolic Panel (09/19/2023 15:42)  CBC & Differential (09/19/2023 15:42)    IMAGING:  None reviewed    ASSESSMENT:  This is a 83 y.o. female with:    *JAK2 positive myeloproliferative disorder, perhaps primary myelofibrosis  She has been seen by Dr. Gerard Foreman with [facility] specialists and cancer and blood disorders in [place].      She was seen there initially on 6/4/2021 with leukocytosis with a white blood cell count of 104,000.  Hemoglobin was normal at 11.6 and platelets were normal at 299,000.  PCR for BCR/ABL and FISH for BCR/ABL were negative.  A bone marrow aspiration was attempted on 6/9/2021 which was " unsuccessful.  She then went to [place] for the summer.  She was admitted to the [facility] between 6/29/2021 and 7/9/2021.  She had a bone marrow aspiration and biopsy performed there on 6/30/2021 showing a hypercellular marrow at greater than 95% with 1% blasts.  Focal 1+ reticulin fibrosis was noted.  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  PCR for BCR/ABL was negative.  JAK2 V617F PCR was positive.  Cytogenetics were normal.  Next generation myeloid disorder profiling of the bone marrow aspirate from 6/30/2021 showed TET2 B3323H and [id number]*47 abnormalities and JAK2 V617F.  Therefore she was suspected to have early primary myelofibrosis and she was started on hydroxyurea 1000 mg daily.  Subsequently, hydroxyurea was held.  Blood counts had improved.  She was admitted to the hospital from 2/9 through 2/15/2022 for symptomatic anemia and chest pain.  Her hemoglobin was 5.5.  The white blood cell count was 7.6.  Platelets were 56,000.  Fecal occult blood testing was negative.  She received 3 units of packed red blood cells.  No endoscopy was performed.  A left heart catheterization was performed with a drug-eluting stent placed to the second diagonal on 2/11/2022 and she was discharged from that hospitalization on aspirin 81 mg, Plavix 75 mg, and Eliquis 5 mg twice daily.  Blood counts improved by 3/7/2022 and her white blood cell count was 7.2 with a hemoglobin 11.4 and platelets 295,000.  She did have a bone marrow aspiration and biopsy on 3/7/2022 showing chronic myeloproliferative neoplasm with potentially post ET fibrosis or consideration of primary myelofibrosis.  There was no evidence for myelodysplasia.  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  She also has atrial fibrillation and is anticoagulated with Eliquis.    Hydroxyurea was discontinued and she was started on ruxolitinib 15 mg  twice daily at her visit on 3/14/2022 with Dr. Gerard Foreman.  She had CT imaging of the chest abdomen pelvis on 3/19/2022 that did not demonstrate any splenomegaly.  Calcified mediastinal lymphadenopathy was noted.  Borderline pretracheal and right hilar lymphadenopathy noted.  Small bilateral pleural effusions with bibasilar atelectasis noted.  She has moved from [place] to [place] and is seen initially in the office on 5/15/2022.  She tolerates ruxolitinib well.  White blood cell count has decreased from 5.9 from 19.6.  Platelets have normalized at 170,000, down from 464,000.  The hemoglobin has also decreased at 8.6 with an MCV of 98.9.  5/26/2022: White blood cell count mildly elevated at 12.4 with a normal platelet count at 216,000.  She tolerates Jakafi very well.   6/30/2022: White blood cell count a little higher.  Continue monitoring.  9/13/2022: White blood cell count higher at 32,000  Flow cytometry 9/13/2022 with 0.1% myeloblasts with a typical (normal) phenotype.  Nonspecific monocyte population.  Bone marrow aspiration and biopsy on 10/7/2022 with hypercellular marrow at 95% with involvement by chronic myeloproliferative neoplasm, less than 5% blasts.  Mild reticulin fibrosis.  Decreased iron stores.  Consideration of CMML.  Flow cytometry showed a dim CD56 positive aberrant monocyte population at 8.2 percent of events.  Cytogenetics pending.  NGS pending.  10/26/2022: White blood cell count improved at 21,000.  Patient admitted 10/29/2022 with COVID-19 infection.  Jakafi held.  Resumed Jakafi 15 mg twice daily on 11/1/2022.  WBC stable at 51,000  On 2/17/2023 a CT scan of the abdomen and pelvis showed improved splenomegaly at 14.5 cm, previously 16 cm  4/10/2023: White blood cell count reasonably stable at 66,000  6/26/2023: White blood cell count stable at 42,000  9/19/2023: White blood cell count stable at 52.98    *Microcytic and now normocytic anemia  9/18/2023: Hemoglobin 8.0, .2      *History of marantic endocarditis and atrial fibrillation  Anticoagulated with Eliquis 5 mg twice daily  Hold this due to post-Mohs bleeding     *Recent admission with heart failure, positive stress test and LEFTY to a large diagonal branch for in sent stenosis.    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  She does not wish to pursue further evaluation.  She did have some brief epigastric/right upper quadrant pain which has now resolved.  Cholecystectomy by Dr. Dani Grover Jr. on 11/28/2022    *COVID-19 infection  Patient presented to ER on 10/29/2022 with progressive generalized weakness x10 days  Patient tested positive for COVID-19 on admission 10/29/2022  Elevated lactate 3.0  Chest x-ray 10/29/2022 with no evidence of consolidation  Patient was started on dexamethasone and remdesivir  Improved    *GI bleeding February 2023  Presented with abdominal pain, melena.  Hemoglobin 6.1 at presentation.  EGD on 1/25/2023 had noted mild mucosal changes characterized by slowing in the lower third of the esophagus.  Patchy mild inflammation characterized by congestion and erythema found in the gastric body.  The examined duodenum was normal.  Multiple biopsies taken.  Suspected bleeding from prior biopsy site  Anticoagulation with Eliquis and Plavix was held.  On PPI  GI follow-up- EGD/push enteroscopy and colonoscopy when Eliquis held 5 days.    Procedures occurred on 2/9/2023.  Nonbleeding internal hemorrhoids.  No source of bleeding identified.    *Elevated transaminases  She has been seen by gastroenterology  Last AST 77 and ALT 96 with an alkaline phosphatase of 507  Anti-smooth muscle antibodies and mitochondrial antibodies normal  A liver biopsy has been suggested  9/19/2023: Transaminases normal    *Recent rotavirus infection    *Skin lesions  She  saw Dr. 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  Continue Plavix 75 mg daily.    Eliquis continues  Check iron studies and ferritin today.  Consider IV iron if appropriate..  Consider red blood cell transfusion if she is starting to feel weaker again.  I encouraged her to call us if this is the case.  We will also consider a transition from Jakafi to momelotinib (Ojjaara) as this was just approved by the FDA for use in people with myelofibrosis and worsening anemia last Friday  Follow-up in about 1 month with labs    High risk medication requiring intensive monitoring.  I spent 40 minutes in this visit today reviewing her record, communicating with her communicating with staff, placing orders, documenting the encounter.    Darrell Reinoso MD  09/19/2023

## 2023-09-26 ENCOUNTER — DOCUMENTATION (OUTPATIENT)
Dept: PHARMACY | Facility: HOSPITAL | Age: 83
End: 2023-09-26
Payer: MEDICARE

## 2023-09-26 ENCOUNTER — TELEPHONE (OUTPATIENT)
Dept: ONCOLOGY | Facility: CLINIC | Age: 83
End: 2023-09-26
Payer: MEDICARE

## 2023-09-26 NOTE — PROGRESS NOTES
Staff message rec from Dr Reinoso on 9/19/2023 inquiring about Ojjaara for pt. This was recently approved.    I contacted [facility] to obtain a date it would be available and was told it would be available 9/29/2023. I was directed to contact Bianka [phone] for a list of pharmacies that would be able to dispense.    I inquired with Sherron at [facility] and she is checking with the inventory team.  Tere with [id number] states this will be on their shelf on 9/27/2023  Bob with [facility] states they will be able to dispense the medication as well.    I attempted to submit the PA for the Ojjaara to [employer] through covermymeds and rec a response that the medication is covered by pts current benefit plan. No further PA activity is needed.    momelotinib (Ojjaara)  Darrell Reinoso MD sent to [facility] Oral Onc Pool  Cc: Isi Mao, Pharmacy Technician  As I mentioned to Lidia, this drug was recently approved last Friday for myelofibrosis and anemia. I think this patient qualifies for this drug. Can someone reach out to the rep and see if it's available? The usual dose is 200 mg daily I believe.    Let me know what you find out. Thanks!      Isi Mao, Pharmacy Technician  Specialty Pharmacy Technician

## 2023-09-26 NOTE — TELEPHONE ENCOUNTER
----- Message from Josey Vera sent at 9/26/2023 11:49 AM EDT -----  Regarding: FW: Please schedule patient    ----- Message -----  From: Lidia Frazier [facility]  Sent: 9/26/2023  11:30 AM EDT  To: [facility] [place]  [place]  Subject: Please schedule patient                          Please schedule this patient for NEW START EDUCATION -  Ojjaara (momelotinib) with Specialty pharmacy and NP.  Please schedule sometime next week.    Thank you!  Lidia

## 2023-09-27 ENCOUNTER — APPOINTMENT (OUTPATIENT)
Dept: GENERAL RADIOLOGY | Facility: HOSPITAL | Age: 83
End: 2023-09-27
Payer: MEDICARE

## 2023-09-27 ENCOUNTER — HOSPITAL ENCOUNTER (OUTPATIENT)
Facility: HOSPITAL | Age: 83
Setting detail: OBSERVATION
Discharge: HOME HEALTH | End: 2023-10-02
Attending: EMERGENCY MEDICINE | Admitting: INTERNAL MEDICINE
Payer: MEDICARE

## 2023-09-27 ENCOUNTER — TELEPHONE (OUTPATIENT)
Dept: ONCOLOGY | Facility: CLINIC | Age: 83
End: 2023-09-27
Payer: MEDICARE

## 2023-09-27 DIAGNOSIS — G89.29 OTHER CHRONIC PAIN: ICD-10-CM

## 2023-09-27 DIAGNOSIS — R53.83 OTHER FATIGUE: ICD-10-CM

## 2023-09-27 DIAGNOSIS — D64.9 ANEMIA, UNSPECIFIED TYPE: ICD-10-CM

## 2023-09-27 DIAGNOSIS — R06.00 DYSPNEA, UNSPECIFIED TYPE: Primary | ICD-10-CM

## 2023-09-27 PROBLEM — K27.9 PUD (PEPTIC ULCER DISEASE): Status: ACTIVE | Noted: 2023-09-27

## 2023-09-27 PROBLEM — G45.9 TIA (TRANSIENT ISCHEMIC ATTACK): Status: ACTIVE | Noted: 2023-09-27

## 2023-09-27 PROBLEM — R79.89 ELEVATED TROPONIN: Status: ACTIVE | Noted: 2023-09-27

## 2023-09-27 LAB
ABO GROUP BLD: NORMAL
ALBUMIN SERPL-MCNC: 4.2 G/DL (ref 3.5–5.2)
ALBUMIN/GLOB SERPL: 1.7 G/DL
ALP SERPL-CCNC: 137 U/L (ref 39–117)
ALT SERPL W P-5'-P-CCNC: 15 U/L (ref 1–33)
ANION GAP SERPL CALCULATED.3IONS-SCNC: 14.8 MMOL/L (ref 5–15)
APTT PPP: 47.9 SECONDS (ref 22.7–35.4)
AST SERPL-CCNC: 20 U/L (ref 1–32)
BASOPHILS # BLD MANUAL: 0.98 10*3/MM3 (ref 0–0.2)
BASOPHILS NFR BLD MANUAL: 2 % (ref 0–1.5)
BILIRUB SERPL-MCNC: 0.6 MG/DL (ref 0–1.2)
BLD GP AB SCN SERPL QL: NEGATIVE
BUN SERPL-MCNC: 12 MG/DL (ref 8–23)
BUN/CREAT SERPL: 15.6 (ref 7–25)
CALCIUM SPEC-SCNC: 9 MG/DL (ref 8.6–10.5)
CHLORIDE SERPL-SCNC: 98 MMOL/L (ref 98–107)
CO2 SERPL-SCNC: 28.2 MMOL/L (ref 22–29)
CREAT SERPL-MCNC: 0.77 MG/DL (ref 0.57–1)
DEPRECATED RDW RBC AUTO: 62.2 FL (ref 37–54)
EGFRCR SERPLBLD CKD-EPI 2021: 75.7 ML/MIN/1.73
EOSINOPHIL # BLD MANUAL: 0.49 10*3/MM3 (ref 0–0.4)
EOSINOPHIL NFR BLD MANUAL: 1 % (ref 0.3–6.2)
ERYTHROCYTE [DISTWIDTH] IN BLOOD BY AUTOMATED COUNT: 18 % (ref 12.3–15.4)
GEN 5 2HR TROPONIN T REFLEX: 47 NG/L
GLOBULIN UR ELPH-MCNC: 2.5 GM/DL
GLUCOSE BLDC GLUCOMTR-MCNC: 110 MG/DL (ref 70–130)
GLUCOSE BLDC GLUCOMTR-MCNC: 167 MG/DL (ref 70–130)
GLUCOSE SERPL-MCNC: 100 MG/DL (ref 65–99)
HCT VFR BLD AUTO: 25.2 % (ref 34–46.6)
HGB BLD-MCNC: 8 G/DL (ref 12–15.9)
INR PPP: 1.48 (ref 0.9–1.1)
LYMPHOCYTES # BLD MANUAL: 3.95 10*3/MM3 (ref 0.7–3.1)
LYMPHOCYTES NFR BLD MANUAL: 7.1 % (ref 5–12)
MAGNESIUM SERPL-MCNC: 1.8 MG/DL (ref 1.6–2.4)
MCH RBC QN AUTO: 30.4 PG (ref 26.6–33)
MCHC RBC AUTO-ENTMCNC: 31.7 G/DL (ref 31.5–35.7)
MCV RBC AUTO: 95.8 FL (ref 79–97)
METAMYELOCYTES NFR BLD MANUAL: 2 % (ref 0–0)
MONOCYTES # BLD: 3.46 10*3/MM3 (ref 0.1–0.9)
MYELOCYTES NFR BLD MANUAL: 1 % (ref 0–0)
NEUTROPHILS # BLD AUTO: 38.42 10*3/MM3 (ref 1.7–7)
NEUTROPHILS NFR BLD MANUAL: 78.8 % (ref 42.7–76)
NT-PROBNP SERPL-MCNC: 6249 PG/ML (ref 0–1800)
PHOSPHATE SERPL-MCNC: 2.5 MG/DL (ref 2.5–4.5)
PLAT MORPH BLD: NORMAL
PLATELET # BLD AUTO: 412 10*3/MM3 (ref 140–450)
PMV BLD AUTO: 11 FL (ref 6–12)
POTASSIUM SERPL-SCNC: 4.4 MMOL/L (ref 3.5–5.2)
PROT SERPL-MCNC: 6.7 G/DL (ref 6–8.5)
PROTHROMBIN TIME: 18.2 SECONDS (ref 11.7–14.2)
QT INTERVAL: 408 MS
QTC INTERVAL: 483 MS
RBC # BLD AUTO: 2.63 10*6/MM3 (ref 3.77–5.28)
RBC MORPH BLD: NORMAL
RH BLD: POSITIVE
SODIUM SERPL-SCNC: 141 MMOL/L (ref 136–145)
T&S EXPIRATION DATE: NORMAL
TROPONIN T DELTA: 4 NG/L
TROPONIN T SERPL HS-MCNC: 43 NG/L
TROPONIN T SERPL HS-MCNC: 46 NG/L
VARIANT LYMPHS NFR BLD MANUAL: 8.1 % (ref 19.6–45.3)
WBC MORPH BLD: NORMAL
WBC NRBC COR # BLD: 48.76 10*3/MM3 (ref 3.4–10.8)

## 2023-09-27 PROCEDURE — 86900 BLOOD TYPING SEROLOGIC ABO: CPT

## 2023-09-27 PROCEDURE — 85007 BL SMEAR W/DIFF WBC COUNT: CPT | Performed by: EMERGENCY MEDICINE

## 2023-09-27 PROCEDURE — 80053 COMPREHEN METABOLIC PANEL: CPT | Performed by: EMERGENCY MEDICINE

## 2023-09-27 PROCEDURE — 93005 ELECTROCARDIOGRAM TRACING: CPT | Performed by: EMERGENCY MEDICINE

## 2023-09-27 PROCEDURE — 85025 COMPLETE CBC W/AUTO DIFF WBC: CPT | Performed by: EMERGENCY MEDICINE

## 2023-09-27 PROCEDURE — G0378 HOSPITAL OBSERVATION PER HR: HCPCS

## 2023-09-27 PROCEDURE — 86923 COMPATIBILITY TEST ELECTRIC: CPT

## 2023-09-27 PROCEDURE — 25010000002 FUROSEMIDE PER 20 MG: Performed by: INTERNAL MEDICINE

## 2023-09-27 PROCEDURE — 36430 TRANSFUSION BLD/BLD COMPNT: CPT

## 2023-09-27 PROCEDURE — 99215 OFFICE O/P EST HI 40 MIN: CPT | Performed by: INTERNAL MEDICINE

## 2023-09-27 PROCEDURE — 82948 REAGENT STRIP/BLOOD GLUCOSE: CPT

## 2023-09-27 PROCEDURE — 85610 PROTHROMBIN TIME: CPT | Performed by: EMERGENCY MEDICINE

## 2023-09-27 PROCEDURE — 71045 X-RAY EXAM CHEST 1 VIEW: CPT

## 2023-09-27 PROCEDURE — 85730 THROMBOPLASTIN TIME PARTIAL: CPT | Performed by: EMERGENCY MEDICINE

## 2023-09-27 PROCEDURE — 86850 RBC ANTIBODY SCREEN: CPT | Performed by: EMERGENCY MEDICINE

## 2023-09-27 PROCEDURE — 84484 ASSAY OF TROPONIN QUANT: CPT | Performed by: EMERGENCY MEDICINE

## 2023-09-27 PROCEDURE — 84100 ASSAY OF PHOSPHORUS: CPT | Performed by: INTERNAL MEDICINE

## 2023-09-27 PROCEDURE — 86900 BLOOD TYPING SEROLOGIC ABO: CPT | Performed by: EMERGENCY MEDICINE

## 2023-09-27 PROCEDURE — 84484 ASSAY OF TROPONIN QUANT: CPT | Performed by: INTERNAL MEDICINE

## 2023-09-27 PROCEDURE — 93010 ELECTROCARDIOGRAM REPORT: CPT | Performed by: INTERNAL MEDICINE

## 2023-09-27 PROCEDURE — 86901 BLOOD TYPING SEROLOGIC RH(D): CPT | Performed by: EMERGENCY MEDICINE

## 2023-09-27 PROCEDURE — P9016 RBC LEUKOCYTES REDUCED: HCPCS

## 2023-09-27 PROCEDURE — 96374 THER/PROPH/DIAG INJ IV PUSH: CPT

## 2023-09-27 PROCEDURE — 83735 ASSAY OF MAGNESIUM: CPT | Performed by: INTERNAL MEDICINE

## 2023-09-27 PROCEDURE — 83880 ASSAY OF NATRIURETIC PEPTIDE: CPT | Performed by: EMERGENCY MEDICINE

## 2023-09-27 PROCEDURE — 99284 EMERGENCY DEPT VISIT MOD MDM: CPT

## 2023-09-27 RX ORDER — ACETAMINOPHEN 325 MG/1
650 TABLET ORAL EVERY 4 HOURS PRN
Status: DISCONTINUED | OUTPATIENT
Start: 2023-09-27 | End: 2023-10-02 | Stop reason: HOSPADM

## 2023-09-27 RX ORDER — ACETAMINOPHEN 160 MG/5ML
650 SOLUTION ORAL EVERY 4 HOURS PRN
Status: DISCONTINUED | OUTPATIENT
Start: 2023-09-27 | End: 2023-10-02 | Stop reason: HOSPADM

## 2023-09-27 RX ORDER — SACCHAROMYCES BOULARDII 250 MG
250 CAPSULE ORAL 2 TIMES DAILY
Status: DISCONTINUED | OUTPATIENT
Start: 2023-09-28 | End: 2023-10-02 | Stop reason: HOSPADM

## 2023-09-27 RX ORDER — BISACODYL 5 MG/1
5 TABLET, DELAYED RELEASE ORAL DAILY PRN
Status: DISCONTINUED | OUTPATIENT
Start: 2023-09-27 | End: 2023-10-02 | Stop reason: HOSPADM

## 2023-09-27 RX ORDER — NITROGLYCERIN 0.4 MG/1
0.4 TABLET SUBLINGUAL
Status: DISCONTINUED | OUTPATIENT
Start: 2023-09-27 | End: 2023-10-02 | Stop reason: HOSPADM

## 2023-09-27 RX ORDER — NALOXONE HCL 0.4 MG/ML
0.4 VIAL (ML) INJECTION
Status: DISCONTINUED | OUTPATIENT
Start: 2023-09-27 | End: 2023-10-02 | Stop reason: HOSPADM

## 2023-09-27 RX ORDER — SODIUM CHLORIDE 0.9 % (FLUSH) 0.9 %
10 SYRINGE (ML) INJECTION AS NEEDED
Status: DISCONTINUED | OUTPATIENT
Start: 2023-09-27 | End: 2023-10-02 | Stop reason: HOSPADM

## 2023-09-27 RX ORDER — BISACODYL 10 MG
10 SUPPOSITORY, RECTAL RECTAL DAILY PRN
Status: DISCONTINUED | OUTPATIENT
Start: 2023-09-27 | End: 2023-10-02 | Stop reason: HOSPADM

## 2023-09-27 RX ORDER — FAMOTIDINE 20 MG/1
20 TABLET, FILM COATED ORAL DAILY
Status: DISCONTINUED | OUTPATIENT
Start: 2023-09-28 | End: 2023-10-02 | Stop reason: HOSPADM

## 2023-09-27 RX ORDER — LATANOPROST 50 UG/ML
1 SOLUTION/ DROPS OPHTHALMIC NIGHTLY
Status: DISCONTINUED | OUTPATIENT
Start: 2023-09-27 | End: 2023-10-02 | Stop reason: HOSPADM

## 2023-09-27 RX ORDER — SODIUM CHLORIDE 9 MG/ML
40 INJECTION, SOLUTION INTRAVENOUS AS NEEDED
Status: DISCONTINUED | OUTPATIENT
Start: 2023-09-27 | End: 2023-10-02 | Stop reason: HOSPADM

## 2023-09-27 RX ORDER — AMOXICILLIN 250 MG
2 CAPSULE ORAL 2 TIMES DAILY
Status: DISCONTINUED | OUTPATIENT
Start: 2023-09-27 | End: 2023-10-02 | Stop reason: HOSPADM

## 2023-09-27 RX ORDER — CALCIUM CARBONATE 500 MG/1
2 TABLET, CHEWABLE ORAL 2 TIMES DAILY PRN
Status: DISCONTINUED | OUTPATIENT
Start: 2023-09-27 | End: 2023-10-02 | Stop reason: HOSPADM

## 2023-09-27 RX ORDER — ACETAMINOPHEN 650 MG/1
650 SUPPOSITORY RECTAL EVERY 4 HOURS PRN
Status: DISCONTINUED | OUTPATIENT
Start: 2023-09-27 | End: 2023-10-02 | Stop reason: HOSPADM

## 2023-09-27 RX ORDER — SODIUM CHLORIDE 0.9 % (FLUSH) 0.9 %
10 SYRINGE (ML) INJECTION EVERY 12 HOURS SCHEDULED
Status: DISCONTINUED | OUTPATIENT
Start: 2023-09-27 | End: 2023-10-02 | Stop reason: HOSPADM

## 2023-09-27 RX ORDER — ONDANSETRON 2 MG/ML
4 INJECTION INTRAMUSCULAR; INTRAVENOUS EVERY 6 HOURS PRN
Status: DISCONTINUED | OUTPATIENT
Start: 2023-09-27 | End: 2023-10-02 | Stop reason: HOSPADM

## 2023-09-27 RX ORDER — SIMETHICONE 80 MG
80 TABLET,CHEWABLE ORAL 4 TIMES DAILY PRN
Status: DISCONTINUED | OUTPATIENT
Start: 2023-09-27 | End: 2023-10-02 | Stop reason: HOSPADM

## 2023-09-27 RX ORDER — POLYETHYLENE GLYCOL 3350 17 G/17G
17 POWDER, FOR SOLUTION ORAL DAILY PRN
Status: DISCONTINUED | OUTPATIENT
Start: 2023-09-27 | End: 2023-10-02 | Stop reason: HOSPADM

## 2023-09-27 RX ORDER — LISINOPRIL 2.5 MG/1
2.5 TABLET ORAL DAILY
Status: DISCONTINUED | OUTPATIENT
Start: 2023-09-28 | End: 2023-10-01

## 2023-09-27 RX ORDER — FUROSEMIDE 40 MG/1
40 TABLET ORAL
Status: DISCONTINUED | OUTPATIENT
Start: 2023-09-28 | End: 2023-09-30

## 2023-09-27 RX ORDER — ALBUTEROL SULFATE 2.5 MG/3ML
2.5 SOLUTION RESPIRATORY (INHALATION) EVERY 6 HOURS PRN
Status: DISCONTINUED | OUTPATIENT
Start: 2023-09-27 | End: 2023-10-02 | Stop reason: HOSPADM

## 2023-09-27 RX ORDER — SERTRALINE HYDROCHLORIDE 100 MG/1
100 TABLET, FILM COATED ORAL DAILY
Status: DISCONTINUED | OUTPATIENT
Start: 2023-09-28 | End: 2023-10-02 | Stop reason: HOSPADM

## 2023-09-27 RX ORDER — DEXTROSE MONOHYDRATE 25 G/50ML
25 INJECTION, SOLUTION INTRAVENOUS
Status: DISCONTINUED | OUTPATIENT
Start: 2023-09-27 | End: 2023-10-02 | Stop reason: HOSPADM

## 2023-09-27 RX ORDER — ONDANSETRON 4 MG/1
4 TABLET, FILM COATED ORAL EVERY 6 HOURS PRN
Status: DISCONTINUED | OUTPATIENT
Start: 2023-09-27 | End: 2023-10-02 | Stop reason: HOSPADM

## 2023-09-27 RX ORDER — NICOTINE POLACRILEX 4 MG
15 LOZENGE BUCCAL
Status: DISCONTINUED | OUTPATIENT
Start: 2023-09-27 | End: 2023-10-02 | Stop reason: HOSPADM

## 2023-09-27 RX ORDER — MORPHINE SULFATE 2 MG/ML
2 INJECTION, SOLUTION INTRAMUSCULAR; INTRAVENOUS EVERY 4 HOURS PRN
Status: DISCONTINUED | OUTPATIENT
Start: 2023-09-27 | End: 2023-10-02 | Stop reason: HOSPADM

## 2023-09-27 RX ORDER — INSULIN LISPRO 100 [IU]/ML
2-7 INJECTION, SOLUTION INTRAVENOUS; SUBCUTANEOUS
Status: DISCONTINUED | OUTPATIENT
Start: 2023-09-27 | End: 2023-10-02 | Stop reason: HOSPADM

## 2023-09-27 RX ORDER — FUROSEMIDE 10 MG/ML
20 INJECTION INTRAMUSCULAR; INTRAVENOUS ONCE
Status: COMPLETED | OUTPATIENT
Start: 2023-09-27 | End: 2023-09-27

## 2023-09-27 RX ORDER — IBUPROFEN 600 MG/1
1 TABLET ORAL
Status: DISCONTINUED | OUTPATIENT
Start: 2023-09-27 | End: 2023-10-02 | Stop reason: HOSPADM

## 2023-09-27 RX ADMIN — Medication 10 ML: at 21:22

## 2023-09-27 RX ADMIN — FUROSEMIDE 20 MG: 10 INJECTION, SOLUTION INTRAMUSCULAR; INTRAVENOUS at 19:03

## 2023-09-27 NOTE — ED NOTES
From [facility]. History of leukemia, was told by onc she probably needed a blood transfusion last week. She woke up this morning and felt SOA, 89% on RA. Placed on 2L by EMS.

## 2023-09-27 NOTE — CONSULTS
[facility] CBC GROUP INITIAL INPATIENT CONSULTATION NOTE    REASON FOR CONSULTATION:  JAK2 positive MPD. Anemia    HISTORY OF PRESENT ILLNESS:  Catherine Boyer is a 83 y.o. female who we are asked to see today in consultation for JAK2 positive MPD and anemia    The patient presented with worsening dyspnea and fatigue that has waxed and waned over the past couple of weeks but acutely worsened yesterday.    Hgb remains 8 but she is much weaker than when I saw her recently in the office. No bleeding.        Past Medical History:   Diagnosis Date    Atherosclerosis of abdominal aorta 02/05/2023    Atrial fibrillation     Breast cancer     CAD (coronary artery disease)     NSTEMI 2/2022: 90% ostial LAD, 99% D1, 70% mid-distal LAD (medical therapy). She received two stents (2.5x18 and 2.5x26mm [place] LEFTY) but I don't know which one went to which lesion.    Carotid atherosclerosis     Cholecystitis 11/22/2022    Added automatically from request for surgery [id number]    Chronic diastolic (congestive) heart failure     COVID 10/29/2022    GERD (gastroesophageal reflux disease)     Glaucoma     History of cataract     Hypertension     Microcytic anemia     per Dr. oleg pate office  note 6/30/22-dd    Myeloproliferative disorder     JAK2 positive    Nonbacterial thrombotic endocarditis     6/2021: 4x5mm vegetation on the ventricular surface of the anterior MV, negative blood cultures    Porcelain gallbladder     PUD (peptic ulcer disease)     TIA (transient ischemic attack)     Type 2 diabetes mellitus     Type 2 diabetes mellitus with circulatory disorder, without long-term current use of insulin 07/14/2022    Upper GI bleed        Past Surgical History:   Procedure Laterality Date    BREAST LUMPECTOMY  1999    CARDIAC CATHETERIZATION N/A 09/02/2022    Procedure: Coronary angiography;  Surgeon: Guero Verde MD;  Location: [facility] CATH INVASIVE LOCATION;  Service: Cardiovascular;  Laterality: N/A;    CARDIAC  CATHETERIZATION N/A 09/02/2022    Procedure: Stent LEFTY coronary;  Surgeon: Guero Verde MD;  Location: [facility] CATH INVASIVE LOCATION;  Service: Cardiovascular;  Laterality: N/A;    CATARACT EXTRACTION  2011    CHOLECYSTECTOMY      CHOLECYSTECTOMY WITH INTRAOPERATIVE CHOLANGIOGRAM N/A 11/28/2022    Procedure: CHOLECYSTECTOMY LAPAROSCOPIC INTRAOPERATIVE CHOLANGIOGRAM;  Surgeon: Dani Grover Jr., MD;  Location: [facility] MAIN OR;  Service: General;  Laterality: N/A;    COLONOSCOPY N/A 02/09/2023    Procedure: COLONOSCOPY TO CECUM & T.I.;  Surgeon: Guero Ferris MD;  Location: [facility] ENDOSCOPY;  Service: Gastroenterology;  Laterality: N/A;  PRE- MELENA, GI BLEED  POST- DIVERTICULOSIS, INT HEMORRHOIDS    ENDOSCOPY N/A 01/25/2023    Procedure: ESOPHAGOGASTRODUODENOSCOPY WITH BIOPSIES;  Surgeon: Charles Diaz MD;  Location: [facility] ENDOSCOPY;  Service: Gastroenterology;  Laterality: N/A;  pre: abd pain, nausea  post: hiatal hernia, mild gastritis, sloughing of the esophagus    ENTEROSCOPY SMALL BOWEL N/A 02/09/2023    Procedure: ENTEROSCOPY SMALL BOWEL;  Surgeon: Guero Ferris MD;  Location: [facility] ENDOSCOPY;  Service: Gastroenterology;  Laterality: N/A;  PRE- MELENA, GI BLEED  POST- HIATAL HERNIA    JOINT REPLACEMENT      UPPER GASTROINTESTINAL ENDOSCOPY         SOCIAL HISTORY:   reports that she has quit smoking. Her smoking use included cigarettes. She has a 20.00 pack-year smoking history. She has been exposed to tobacco smoke. She has never used smokeless tobacco. She reports current alcohol use. She reports that she does not use drugs.    FAMILY HISTORY:  family history includes Lung cancer in her father and sister; Ovarian cancer in her mother.    ALLERGIES:  Allergies   Allergen Reactions    Aspirin Unknown - Low Severity    Oxycodone Unknown - Low Severity    Hydroxyurea Other (See Comments)     Her hemoglobin drop and was stop in February 2022 and start taking Jakafi    Diphenhydramine Hcl Anxiety and  Unknown (See Comments)     Benadryl IVP       MEDICATIONS:  As listed in the electronic medical record.    Review of Systems   Constitutional:  Positive for activity change and fatigue.   Respiratory:  Positive for shortness of breath.    All other systems reviewed and are negative.    Vitals:    09/27/23 1400 09/27/23 1415 09/27/23 1418 09/27/23 1500   BP:  98/48     Pulse: 107 89  90   Resp:       Temp:       SpO2: 96%  95% 95%   Weight:       Height:           Physical Exam  Vitals reviewed.   Constitutional:       Appearance: She is well-developed. She is ill-appearing.   HENT:      Head: Normocephalic and atraumatic.      Nose: Nose normal.   Eyes:      Conjunctiva/sclera: Conjunctivae normal.      Pupils: Pupils are equal, round, and reactive to light.   Cardiovascular:      Rate and Rhythm: Normal rate and regular rhythm.      Heart sounds: Normal heart sounds, S1 normal and S2 normal. No murmur heard.    No friction rub. No gallop.   Pulmonary:      Effort: Pulmonary effort is normal. No respiratory distress.      Breath sounds: Normal breath sounds. No stridor. No wheezing, rhonchi or rales.   Chest:      Chest wall: No tenderness.   Abdominal:      General: Bowel sounds are normal. There is no distension.      Palpations: Abdomen is soft. There is no mass.      Tenderness: There is no abdominal tenderness. There is no guarding or rebound.   Musculoskeletal:         General: Normal range of motion.      Cervical back: Neck supple.   Lymphadenopathy:      Cervical: No cervical adenopathy.      Upper Body:      Right upper body: No supraclavicular adenopathy.      Left upper body: No supraclavicular adenopathy.   Skin:     General: Skin is warm and dry.      Findings: No erythema or rash.      Comments: Bandaged left leg below the knee   Neurological:      Mental Status: She is alert and oriented to person, place, and time.      Cranial Nerves: No cranial nerve deficit.      Sensory: No sensory deficit.    Psychiatric:         Behavior: Behavior normal.         Thought Content: Thought content normal.         Judgment: Judgment normal.       DIAGNOSTIC DATA:  Results from last 7 days   Lab Units 09/27/23  1156   WBC 10*3/mm3 48.76*   HEMOGLOBIN g/dL 8.0*   HEMATOCRIT % 25.2*   PLATELETS 10*3/mm3 412     Lab Results   Component Value Date    NEUTROABS 38.42 (H) 09/27/2023     Results from last 7 days   Lab Units 09/27/23  1156   SODIUM mmol/L 141   POTASSIUM mmol/L 4.4   CHLORIDE mmol/L 98   CO2 mmol/L 28.2   BUN mg/dL 12   CREATININE mg/dL 0.77   GLUCOSE mg/dL 100*   CALCIUM mg/dL 9.0     Results from last 7 days   Lab Units 09/27/23  1156   INR  1.48*   APTT seconds 47.9*     Results from last 7 days   Lab Units 09/27/23  1156   MAGNESIUM mg/dL 1.8       IMAGING:      XR Chest 1 View (09/27/2023 12:00)     Image reviewed. I believe I see some pulmonary vascular congestion..     ASSESSMENT:  This is a 83 y.o. female with:    *JAK2 positive myeloproliferative disorder, perhaps primary myelofibrosis  She has been seen by Dr. Gerard Foreman with [facility] specialists and cancer and blood disorders in [place].      She was seen there initially on 6/4/2021 with leukocytosis with a white blood cell count of 104,000.  Hemoglobin was normal at 11.6 and platelets were normal at 299,000.  PCR for BCR/ABL and FISH for BCR/ABL were negative.  A bone marrow aspiration was attempted on 6/9/2021 which was unsuccessful.  She then went to [place] for the summer.  She was admitted to the [facility] between 6/29/2021 and 7/9/2021.  She had a bone marrow aspiration and biopsy performed there on 6/30/2021 showing a hypercellular marrow at greater than 95% with 1% blasts.  Focal 1+ reticulin fibrosis was noted.  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  PCR for BCR/ABL was negative.  JAK2 V617F PCR was positive.  Cytogenetics were normal.  Next generation myeloid disorder profiling of the bone marrow aspirate  from 6/30/2021 showed TET2 X5500M and [id number] abnormalities and JAK2 V617F.  Therefore she was suspected to have early primary myelofibrosis and she was started on hydroxyurea 1000 mg daily.  Subsequently, hydroxyurea was held.  Blood counts had improved.  She was admitted to the hospital from 2/9 through 2/15/2022 for symptomatic anemia and chest pain.  Her hemoglobin was 5.5.  The white blood cell count was 7.6.  Platelets were 56,000.  Fecal occult blood testing was negative.  She received 3 units of packed red blood cells.  No endoscopy was performed.  A left heart catheterization was performed with a drug-eluting stent placed to the second diagonal on 2/11/2022 and she was discharged from that hospitalization on aspirin 81 mg, Plavix 75 mg, and Eliquis 5 mg twice daily.  Blood counts improved by 3/7/2022 and her white blood cell count was 7.2 with a hemoglobin 11.4 and platelets 295,000.  She did have a bone marrow aspiration and biopsy on 3/7/2022 showing chronic myeloproliferative neoplasm with potentially post ET fibrosis or consideration of primary myelofibrosis.  There was no evidence for myelodysplasia.  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  She also has atrial fibrillation and is anticoagulated with Eliquis.    Hydroxyurea was discontinued and she was started on ruxolitinib 15 mg twice daily at her visit on 3/14/2022 with Dr. Gerard Foreman.  She had CT imaging of the chest abdomen pelvis on 3/19/2022 that did not demonstrate any splenomegaly.  Calcified mediastinal lymphadenopathy was noted.  Borderline pretracheal and right hilar lymphadenopathy noted.  Small bilateral pleural effusions with bibasilar atelectasis noted.  She has moved from [place] to [place] and is seen initially in the office on 5/15/2022.  She tolerates ruxolitinib well.  White blood cell count has decreased from 5.9 from 19.6.  Platelets  have normalized at 170,000, down from 464,000.  The hemoglobin has also decreased at 8.6 with an MCV of 98.9.  5/26/2022: White blood cell count mildly elevated at 12.4 with a normal platelet count at 216,000.  She tolerates Jakafi very well.   6/30/2022: White blood cell count a little higher.  Continue monitoring.  9/13/2022: White blood cell count higher at 32,000  Flow cytometry 9/13/2022 with 0.1% myeloblasts with a typical (normal) phenotype.  Nonspecific monocyte population.  Bone marrow aspiration and biopsy on 10/7/2022 with hypercellular marrow at 95% with involvement by chronic myeloproliferative neoplasm, less than 5% blasts.  Mild reticulin fibrosis.  Decreased iron stores.  Consideration of CMML.  Flow cytometry showed a dim CD56 positive aberrant monocyte population at 8.2 percent of events.  Cytogenetics pending.  NGS pending.  10/26/2022: White blood cell count improved at 21,000.  Patient admitted 10/29/2022 with COVID-19 infection.  Jakafi held.  Resumed Jakafi 15 mg twice daily on 11/1/2022.  WBC stable at 51,000  On 2/17/2023 a CT scan of the abdomen and pelvis showed improved splenomegaly at 14.5 cm, previously 16 cm  4/10/2023: White blood cell count reasonably stable at 66,000  6/26/2023: White blood cell count stable at 42,000  9/19/2023: White blood cell count stable at 52.98  WBC 48.76     *Microcytic and now normocytic anemia  9/18/2023: Hemoglobin 8.0, .2  Hgb stable at 8     *History of marantic endocarditis and atrial fibrillation  Anticoagulated with Eliquis 5 mg twice daily  Hold this due to post-Mohs bleeding     *Recent admission with heart failure, positive stress test and LEFTY to a large diagonal branch for in sent stenosis.  BNP 6249, up from 1106, from 2589, from 12,196     *Abnormal gallbladder on CT  Patient had developed epigastric pain at the time of admission  CT abdomen and pelvis 10/29/2022 showed evidence of a porcelain gallbladder  Patient is aware of this  diagnosis.  She does not wish to pursue further evaluation.  She did have some brief epigastric/right upper quadrant pain which has now resolved.  Cholecystectomy by Dr. Dani Grover Jr. on 11/28/2022     *COVID-19 infection  Patient presented to ER on 10/29/2022 with progressive generalized weakness x10 days  Patient tested positive for COVID-19 on admission 10/29/2022  Elevated lactate 3.0  Chest x-ray 10/29/2022 with no evidence of consolidation  Patient was started on dexamethasone and remdesivir  Improved     *GI bleeding February 2023  Presented with abdominal pain, melena.  Hemoglobin 6.1 at presentation.  EGD on 1/25/2023 had noted mild mucosal changes characterized by slowing in the lower third of the esophagus.  Patchy mild inflammation characterized by congestion and erythema found in the gastric body.  The examined duodenum was normal.  Multiple biopsies taken.  Suspected bleeding from prior biopsy site  Anticoagulation with Eliquis and Plavix was held.  On PPI  GI follow-up- EGD/push enteroscopy and colonoscopy when Eliquis held 5 days.    Procedures occurred on 2/9/2023.  Nonbleeding internal hemorrhoids.  No source of bleeding identified.     *Elevated transaminases  Normalized     *Skin lesions  She saw Dr. 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  Continue Plavix 75 mg daily.    Eliquis continues  Transfuse 1 unit pRBC with lasix following   We are planning a transition from Jakafi to momelotinib (Ojjaara) as this was just approved by the FDA for use in people with myelofibrosis and worsening anemia. My office is working to get this as it is a newly available drug.   Daily CBC  Will follow. Discussed with her and her family at the bedside.     Darrell Reinoso MD

## 2023-09-27 NOTE — ED NOTES
"Nursing report ED to floor  Catherine Boyer  83 y.o.  female    HPI :   Chief Complaint   Patient presents with    Shortness of Breath       Admitting doctor:   Jennie Faye MD    Admitting diagnosis:   The primary encounter diagnosis was Dyspnea, unspecified type. Diagnoses of Other fatigue and Anemia, unspecified type were also pertinent to this visit.    Code status:   Current Code Status       Date Active Code Status Order ID Comments User Context       Prior            Allergies:   Aspirin, Oxycodone, Hydroxyurea, and Diphenhydramine hcl    Isolation:   No active isolations    Intake and Output  No intake or output data in the 24 hours ending 09/27/23 1344    Weight:       09/27/23  1219   Weight: 68.6 kg (151 lb 3.2 oz)       Most recent vitals:   Vitals:    09/27/23 1201 09/27/23 1219 09/27/23 1301 09/27/23 1303   BP: 113/70  111/58    Pulse: 90  94    Resp: 18      Temp:       SpO2:    96%   Weight:  68.6 kg (151 lb 3.2 oz)     Height:  162.6 cm (64.02\")         Active LDAs/IV Access:   Lines, Drains & Airways       Active LDAs       Name Placement date Placement time Site Days    Peripheral IV 09/27/23 1201 Left Antecubital 09/27/23  1201  Antecubital  less than 1    External Urinary Catheter 08/13/23  2300  --  44                    Labs (abnormal labs have a star):   Labs Reviewed   COMPREHENSIVE METABOLIC PANEL - Abnormal; Notable for the following components:       Result Value    Glucose 100 (*)     Alkaline Phosphatase 137 (*)     All other components within normal limits    Narrative:     GFR Normal >60  Chronic Kidney Disease <60  Kidney Failure <15    The GFR formula is only valid for adults with stable renal function between ages 18 and 70.   PROTIME-INR - Abnormal; Notable for the following components:    Protime 18.2 (*)     INR 1.48 (*)     All other components within normal limits   APTT - Abnormal; Notable for the following components:    PTT 47.9 (*)     All other components within " normal limits   SINGLE HSTROPONIN T - Abnormal; Notable for the following components:    HS Troponin T 46 (*)     All other components within normal limits    Narrative:     High Sensitive Troponin T Reference Range:  <10.0 ng/L- Negative Female for AMI  <15.0 ng/L- Negative Male for AMI  >=10 - Abnormal Female indicating possible myocardial injury.  >=15 - Abnormal Male indicating possible myocardial injury.   Clinicians would have to utilize clinical acumen, EKG, Troponin, and serial changes to determine if it is an Acute Myocardial Infarction or myocardial injury due to an underlying chronic condition.        CBC WITH AUTO DIFFERENTIAL - Abnormal; Notable for the following components:    WBC 48.76 (*)     RBC 2.63 (*)     Hemoglobin 8.0 (*)     Hematocrit 25.2 (*)     RDW 18.0 (*)     RDW-SD 62.2 (*)     All other components within normal limits   BNP (IN-HOUSE) - Abnormal; Notable for the following components:    proBNP 6,249.0 (*)     All other components within normal limits    Narrative:     This assay is used as an aid in the diagnosis of individuals suspected of having heart failure. It can be used as an aid in the diagnosis of acute decompensated heart failure (ADHF) in patients presenting with signs and symptoms of ADHF to the emergency department (ED). In addition, NT-proBNP of <300 pg/mL indicates ADHF is not likely.   MANUAL DIFFERENTIAL - Abnormal; Notable for the following components:    Neutrophil % 78.8 (*)     Lymphocyte % 8.1 (*)     Basophil % 2.0 (*)     Metamyelocyte % 2.0 (*)     Myelocyte % 1.0 (*)     Neutrophils Absolute 38.42 (*)     Lymphocytes Absolute 3.95 (*)     Monocytes Absolute 3.46 (*)     Eosinophils Absolute 0.49 (*)     Basophils Absolute 0.98 (*)     All other components within normal limits   TYPE AND SCREEN   CBC AND DIFFERENTIAL    Narrative:     The following orders were created for panel order CBC & Differential.  Procedure                               Abnormality        "  Status                     ---------                               -----------         ------                     CBC Auto Differential[[id number]]        Abnormal            Final result                 Please view results for these tests on the individual orders.       EKG:   ECG 12 Lead Dyspnea   Preliminary Result   HEART RATE= 84  bpm   RR Interval= 714  ms   [place] Interval= 164  ms   P Horizontal Axis= -64  deg   P Front Axis= 13  deg   QRSD Interval= 91  ms   QT Interval= 408  ms   QTcB= 483  ms   QRS Axis= 21  deg   T Wave Axis= 23  deg   - OTHERWISE NORMAL ECG -   Sinus rhythm   Atrial premature complex   Electronically Signed By:    Date and Time of Study: 2023-09-27 11:48:30          Meds given in ED:   Medications   sodium chloride 0.9 % flush 10 mL (has no administration in time range)       Imaging results:  XR Chest 1 View    Result Date: 9/27/2023  Negative.  This report was finalized on 9/27/2023 12:04 PM by Dr. Charles Tirado M.D.       Ambulatory status:   - assist x 1    Social issues:   Social History     Socioeconomic History    Marital status:    Tobacco Use    Smoking status: Former     Packs/day: 1.00     Years: 20.00     Pack years: 20.00     Types: Cigarettes     Passive exposure: Past    Smokeless tobacco: Never    Tobacco comments:     30  years ago   Vaping Use    Vaping Use: Never used   Substance and Sexual Activity    Alcohol use: Yes     Comment: \"rare\"    Drug use: Never    Sexual activity: Defer       NIH Stroke Scale:       Bobbi De La Torre RN  09/27/23 13:44 EDT       "

## 2023-09-27 NOTE — TELEPHONE ENCOUNTER
Provider: Dr Reinoso  Caller: Karen  Relationship to Patient: Daughter  Call Back Phone Number: [phone]  Reason for Call: Pt has increase fatigue, breathing is shallow, does not sound good. Stated Dr Reinoso stated at last appt. to call in if there are any changes.

## 2023-09-27 NOTE — ED PROVIDER NOTES
EMERGENCY DEPARTMENT ENCOUNTER    Room Number:  15/15  Date seen:  9/27/2023  PCP: Kristi Moreau APRN  Historian(s): Patient, paramedics, patient's daughter      HPI:  Chief Complaint: Dyspnea, fatigue  A complete HPI/ROS/PMH/PSH/SH/FH are unobtainable / limited due to: None  Context: Catherine Boyer is a 83 y.o. female who presents to the ED via EMS from her assisted living facility today because of worsening shortness of breath and fatigue.  Patient has a history of myeloproliferative disorder she follows with Dr. Reinoso in the hematology office.  She was informed last week that she may need a blood transfusion soon.  Since this past weekend, and for the past 5 days now she has had increasing shortness of breath even at rest.  She began using her oxygen at nighttime again and when paramedics came to pick her up they found her to be 89% on room air.  Patient denies any chest pain.  She denies any fevers or flu symptoms.  She has had some generalized weakness and fatigue that has impaired her ability to get up and move around as she would like.      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    Paroxysmal atrial fibrillation 07/14/2022    Myeloproliferative disorder 07/14/2022    Microcytic anemia 07/14/2022    Type 2 diabetes mellitus with circulatory disorder, without long-term current use of insulin 07/14/2022    GERD (gastroesophageal reflux disease)     Hypertension     Personal history of transient ischemic attack (TIA), 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 deficits 09/04/2022    Porcelain gallbladder     Breast cancer     Chronic diastolic heart failure     Gastritis 01/26/2023    Atherosclerosis of abdominal aorta 02/05/2023    APC (atrial premature contractions) 02/24/2023    Moderate malnutrition 05/05/2023    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 05/05/2023    KARLI (acute kidney injury)  08/13/2023    Intractable nausea and vomiting 08/13/2023     Resolved Ambulatory Problems     Diagnosis Date Noted    Acute on chronic diastolic heart failure 07/14/2022    Chest pain with high risk for cardiac etiology 08/31/2022    COVID 10/29/2022    Acute UTI (urinary tract infection) 11/21/2022    Cholecystitis 11/22/2022    Urinary tract infection without hematuria, site unspecified 01/23/2023    Epigastric abdominal pain 01/24/2023    ABLA (acute blood loss anemia) 02/04/2023    Hypokalemia 02/07/2023    Rotavirus enteritis 05/04/2023    KARLI (acute kidney injury) 05/04/2023    Acute renal failure, unspecified acute renal failure type 05/04/2023     Past Medical History:   Diagnosis Date    Atrial fibrillation     Carotid atherosclerosis     Chronic diastolic (congestive) heart failure     Glaucoma     History of cataract     PUD (peptic ulcer disease)     TIA (transient ischemic attack)     Type 2 diabetes mellitus     Upper GI bleed          PAST SURGICAL HISTORY  Past Surgical History:   Procedure Laterality Date    BREAST LUMPECTOMY  1999    CARDIAC CATHETERIZATION N/A 09/02/2022    Procedure: Coronary angiography;  Surgeon: Guero Verde MD;  Location: [facility] CATH INVASIVE LOCATION;  Service: Cardiovascular;  Laterality: N/A;    CARDIAC CATHETERIZATION N/A 09/02/2022    Procedure: Stent LEFTY coronary;  Surgeon: Guero Verde MD;  Location: [facility] CATH INVASIVE LOCATION;  Service: Cardiovascular;  Laterality: N/A;    CATARACT EXTRACTION  2011    CHOLECYSTECTOMY      CHOLECYSTECTOMY WITH INTRAOPERATIVE CHOLANGIOGRAM N/A 11/28/2022    Procedure: CHOLECYSTECTOMY LAPAROSCOPIC INTRAOPERATIVE CHOLANGIOGRAM;  Surgeon: Dani Grover Jr., MD;  Location: [facility] MAIN OR;  Service: General;  Laterality: N/A;    COLONOSCOPY N/A 02/09/2023    Procedure: COLONOSCOPY TO CECUM & T.I.;  Surgeon: Guero Ferris MD;  Location: [facility] ENDOSCOPY;  Service: Gastroenterology;  Laterality: N/A;  PRE- MELENA, GI  "BLEED  POST- DIVERTICULOSIS, INT HEMORRHOIDS    ENDOSCOPY N/A 01/25/2023    Procedure: ESOPHAGOGASTRODUODENOSCOPY WITH BIOPSIES;  Surgeon: Charles Diaz MD;  Location: [facility] ENDOSCOPY;  Service: Gastroenterology;  Laterality: N/A;  pre: abd pain, nausea  post: hiatal hernia, mild gastritis, sloughing of the esophagus    ENTEROSCOPY SMALL BOWEL N/A 02/09/2023    Procedure: ENTEROSCOPY SMALL BOWEL;  Surgeon: Guero Ferris MD;  Location: [facility] ENDOSCOPY;  Service: Gastroenterology;  Laterality: N/A;  PRE- MELENA, GI BLEED  POST- HIATAL HERNIA    JOINT REPLACEMENT      UPPER GASTROINTESTINAL ENDOSCOPY           FAMILY HISTORY  Family History   Problem Relation Age of Onset    Ovarian cancer Mother     Lung cancer Father     Lung cancer Sister     Malig Hyperthermia Neg Hx          SOCIAL HISTORY  Social History     Socioeconomic History    Marital status:    Tobacco Use    Smoking status: Former     Packs/day: 1.00     Years: 20.00     Pack years: 20.00     Types: Cigarettes     Passive exposure: Past    Smokeless tobacco: Never    Tobacco comments:     30  years ago   Vaping Use    Vaping Use: Never used   Substance and Sexual Activity    Alcohol use: Yes     Comment: \"rare\"    Drug use: Never    Sexual activity: Defer         ALLERGIES  Aspirin, Oxycodone, Hydroxyurea, and Diphenhydramine hcl        REVIEW OF SYSTEMS  Review of Systems   Constitutional:  Positive for fatigue. Negative for activity change and fever.   HENT: Negative.     Eyes:  Negative for pain and visual disturbance.   Respiratory:  Positive for shortness of breath. Negative for cough.    Cardiovascular:  Negative for chest pain.   Gastrointestinal:  Negative for abdominal pain.   Genitourinary:  Negative for dysuria.   Skin:  Negative for color change.   Neurological:  Positive for weakness. Negative for syncope and headaches.   All other systems reviewed and are negative.         PHYSICAL EXAM  ED Triage Vitals [09/27/23 1128]   Temp " Heart Rate Resp BP SpO2   98.3 °F (36.8 °C) 93 26 145/55 96 %      Temp src Heart Rate Source Patient Position BP Location FiO2 (%)   -- -- -- -- --       Physical Exam      GENERAL: Pleasant, elderly lady, calm, no acute distress  HENT: nares patent, normocephalic and atraumatic  EYES: no scleral icterus, EOMI, normal conjunctivae  CV: regular rhythm, normal rate, normal distal pulses  RESPIRATORY: normal effort, no stridor, lungs are clear to auscultation bilaterally without wheezes or rales or rhonchi.  Normal work of breathing noted during conversation at rest  ABDOMEN: soft, nontender in all quadrants  MUSCULOSKELETAL: no deformity, no asymmetry, no significant edema.  There is an Ace bandage over the left ankle where she has a reportedly chronic dermatologic lesion  NEURO: alert, moves all extremities, follows commands  PSYCH:  calm, cooperative  SKIN: warm, dry    Vital signs and nursing notes reviewed.        LAB RESULTS  Recent Results (from the past 24 hour(s))   ECG 12 Lead Dyspnea    Collection Time: 09/27/23 11:48 AM   Result Value Ref Range    QT Interval 408 ms    QTC Interval 483 ms   Comprehensive Metabolic Panel    Collection Time: 09/27/23 11:56 AM    Specimen: Blood   Result Value Ref Range    Glucose 100 (H) 65 - 99 mg/dL    BUN 12 8 - 23 mg/dL    Creatinine 0.77 0.57 - 1.00 mg/dL    Sodium 141 136 - 145 mmol/L    Potassium 4.4 3.5 - 5.2 mmol/L    Chloride 98 98 - 107 mmol/L    CO2 28.2 22.0 - 29.0 mmol/L    Calcium 9.0 8.6 - 10.5 mg/dL    Total Protein 6.7 6.0 - 8.5 g/dL    Albumin 4.2 3.5 - 5.2 g/dL    ALT (SGPT) 15 1 - 33 U/L    AST (SGOT) 20 1 - 32 U/L    Alkaline Phosphatase 137 (H) 39 - 117 U/L    Total Bilirubin 0.6 0.0 - 1.2 mg/dL    Globulin 2.5 gm/dL    A/G Ratio 1.7 g/dL    BUN/Creatinine Ratio 15.6 7.0 - 25.0    Anion Gap 14.8 5.0 - 15.0 mmol/L    eGFR 75.7 >60.0 mL/min/1.73   Protime-INR    Collection Time: 09/27/23 11:56 AM    Specimen: Blood   Result Value Ref Range    Protime  18.2 (H) 11.7 - 14.2 Seconds    INR 1.48 (H) 0.90 - 1.10   aPTT    Collection Time: 09/27/23 11:56 AM    Specimen: Blood   Result Value Ref Range    PTT 47.9 (H) 22.7 - 35.4 seconds   Type & Screen    Collection Time: 09/27/23 11:56 AM    Specimen: Blood   Result Value Ref Range    ABO Type O     RH type Positive     Antibody Screen Negative     T&S Expiration Date 9/30/2023 11:59:59 PM    Single High Sensitivity Troponin T    Collection Time: 09/27/23 11:56 AM    Specimen: Blood   Result Value Ref Range    HS Troponin T 46 (H) <10 ng/L   CBC Auto Differential    Collection Time: 09/27/23 11:56 AM    Specimen: Blood   Result Value Ref Range    WBC 48.76 (C) 3.40 - 10.80 10*3/mm3    RBC 2.63 (L) 3.77 - 5.28 10*6/mm3    Hemoglobin 8.0 (L) 12.0 - 15.9 g/dL    Hematocrit 25.2 (L) 34.0 - 46.6 %    MCV 95.8 79.0 - 97.0 fL    MCH 30.4 26.6 - 33.0 pg    MCHC 31.7 31.5 - 35.7 g/dL    RDW 18.0 (H) 12.3 - 15.4 %    RDW-SD 62.2 (H) 37.0 - 54.0 fl    MPV 11.0 6.0 - 12.0 fL    Platelets 412 140 - 450 10*3/mm3   BNP    Collection Time: 09/27/23 11:56 AM    Specimen: Blood   Result Value Ref Range    proBNP 6,249.0 (H) 0.0 - 1,800.0 pg/mL   Manual Differential    Collection Time: 09/27/23 11:56 AM    Specimen: Blood   Result Value Ref Range    Neutrophil % 78.8 (H) 42.7 - 76.0 %    Lymphocyte % 8.1 (L) 19.6 - 45.3 %    Monocyte % 7.1 5.0 - 12.0 %    Eosinophil % 1.0 0.3 - 6.2 %    Basophil % 2.0 (H) 0.0 - 1.5 %    Metamyelocyte % 2.0 (H) 0.0 - 0.0 %    Myelocyte % 1.0 (H) 0.0 - 0.0 %    Neutrophils Absolute 38.42 (H) 1.70 - 7.00 10*3/mm3    Lymphocytes Absolute 3.95 (H) 0.70 - 3.10 10*3/mm3    Monocytes Absolute 3.46 (H) 0.10 - 0.90 10*3/mm3    Eosinophils Absolute 0.49 (H) 0.00 - 0.40 10*3/mm3    Basophils Absolute 0.98 (H) 0.00 - 0.20 10*3/mm3    RBC Morphology Normal Normal    WBC Morphology Normal Normal    Platelet Morphology Normal Normal       Ordered the above labs and reviewed the results.        RADIOLOGY  XR Chest 1  View    Result Date: 9/27/2023  Portable chest x-ray  HISTORY: Shortness of breath.  TECHNIQUE: Portable chest x-ray correlated with chest x-ray August 28, 2023.  FINDINGS: Advanced arthritic change of both shoulders. Cardiomediastinal contours are normal. Vascular volume is normal. Lungs appear clear and the costophrenic sulci are dry.      Negative.  This report was finalized on 9/27/2023 12:04 PM by Dr. Charles Tirado M.D.       Ordered the above noted radiological studies. Reviewed by me in PACS.          PROCEDURES  Procedures      MEDICATIONS GIVEN IN ER  Medications   sodium chloride 0.9 % flush 10 mL (has no administration in time range)       MEDICAL DECISION MAKING, PROGRESS, and CONSULTS    All labs have been independently reviewed by me.  All radiology studies have been reviewed by me and I have also reviewed the radiology report.   EKG's independently viewed and interpreted by me.  Discussion below represents my analysis of pertinent findings related to patient's condition, differential diagnosis, treatment plan and final disposition.    EKG           EKG time/Interp time: 1148/1153  Rhythm/Rate: Sinus rhythm, 84 bpm  P waves and [place]: Present, 164 ms  QRS, axis: 91 ms, normal axis  ST and T waves: No ST segment elevations are notable.  No change in comparison to previous exam on file from August 28, 2023.    Independently interpreted by me contemporaneously with treatment    Additional sources:  - Discussed/ obtained information from independent historians:  I discussed with paramedics to receive report of patient's condition on arrival and treatments initiated in route to the hospital.    - External (non-ED) record review: I reviewed the previous cardiology office progress note from August 2, 2023 when she had follow-up care for coronary artery disease and chronic diastolic heart failure as well as paroxysmal atrial fibrillation and hypertension.    - Chronic or social conditions impacting care:  Elderly patient with multiple medical problems on chemotherapy who lives in assisted living facility.  Accompanied by her daughter here at this visit, but her daughter was recently on vacation and did not return home until this week and found the patient showing signs of increased troubles breathing.          Orders placed during this visit:  Orders Placed This Encounter   Procedures    XR Chest 1 View    Comprehensive Metabolic Panel    Protime-INR    aPTT    Single High Sensitivity Troponin T    CBC Auto Differential    BNP    Manual Differential    Hematology and Oncology (on-call MD unless specified)    [facility] (on-call MD unless specified) Details    ECG 12 Lead Dyspnea    Type & Screen    Insert Peripheral IV    Initiate Observation Status    CBC & Differential           Differential diagnosis includes but is not limited to:    Anemia, CHF exacerbation, acute MI, pneumonia, pneumothorax      Independent interpretation of labs, radiology studies, and discussions with consultants:  ED Course as of 09/27/23 1338   Wed Sep 27, 2023   1146 Patient is nontoxic-appearing and afebrile.  She denies chest pain.  However she does have dyspnea and fatigue.  She has had profound anemia problems in the past requiring transfusion.  I will certainly do a hematologic work-up, checking CBC and coagulation profiles.  She also has a history of cardiac disease including CHF.  Therefore I will get a chest x-ray and BNP values and troponin as well as EKG. [KUMAR]   1307 Hemoglobin(!): 8.0 [KUMAR]   1308 Hematocrit(!): 25.2 [KUMAR]   1308 WBC(!!): 48.76 [KUMAR]   1308 I independently interpreted the Chest X-ray and my findings are: No Pneumothorax, No Effusion, No Infiltrate   [KUMAR]   1308 proBNP(!): 6,249.0 [KUMAR]   1335 I discussed with Dr. Reinoso from hematology about the patient.  He knows her well.  He indicates that they typically wait until her hemoglobin is in the sevens range before transfusing and he did not necessarily feel like we should  activate a blood transfusion just yet.  He and I both note that her proBNP value is elevated today compared to baseline however chest x-ray does not show convincing evidence of volume overload.  He recommended a trial of diuresis to see if that had any effect on her symptoms and thinking it may contract her hemoglobin to a higher level. [KUMAR]   9637 I discussed with Dr. Faye from [facility] about the patient.  She agrees to accept her to the hospitalist service on telemetry bed for observation and continued management today. [KUMAR]      ED Course User Index  [KUMAR] Stan Villanueva MD         DIAGNOSIS  Final diagnoses:   Dyspnea, unspecified type   Other fatigue   Anemia, unspecified type         DISPOSITION  Observation to [facility]  Hematology consult        Latest Documented Vital Signs:  As of 13:38 EDT  BP- 111/58 HR- 94 Temp- 98.3 °F (36.8 °C) O2 sat- 96%              --    Please note that portions of this were completed with a voice recognition program.       Note Disclaimer: At [facility], we believe that sharing information builds trust and better relationships. You are receiving this note because you are receiving care at [facility] or recently visited. It is possible you will see health information before a provider has talked with you about it. This kind of information can be easy to misunderstand. To help you fully understand what it means for your health, we urge you to discuss this note with your provider.             Stan Villanueva MD  09/27/23 1784

## 2023-09-27 NOTE — TELEPHONE ENCOUNTER
Called the daughter back and she stated the patient was very soa and that her breathing was shallow and that she is so tired and she is worried about her and someone last visit mentioned that she could call us and let us know and we would be able to order blood for her. She then stated that she had not worn oxygen for awhile and she had to wear it since yesterday evening. I explained that we would need to see where she was for a recent cbc and to then order a transfusion if necessary and that we could not promise today and that she would be best suited to go to the ER to be evaluated and treated as appropriate. Patients daughter v/u.

## 2023-09-28 ENCOUNTER — TELEPHONE (OUTPATIENT)
Dept: ONCOLOGY | Facility: CLINIC | Age: 83
End: 2023-09-28
Payer: MEDICARE

## 2023-09-28 PROBLEM — D64.9 ANEMIA: Status: ACTIVE | Noted: 2023-09-28

## 2023-09-28 LAB
ANION GAP SERPL CALCULATED.3IONS-SCNC: 11 MMOL/L (ref 5–15)
ANISOCYTOSIS BLD QL: ABNORMAL
BASOPHILS # BLD MANUAL: 0.32 10*3/MM3 (ref 0–0.2)
BASOPHILS NFR BLD MANUAL: 1 % (ref 0–1.5)
BH BB BLOOD EXPIRATION DATE: NORMAL
BH BB BLOOD TYPE BARCODE: 5100
BH BB DISPENSE STATUS: NORMAL
BH BB PRODUCT CODE: NORMAL
BH BB UNIT NUMBER: NORMAL
BUN SERPL-MCNC: 15 MG/DL (ref 8–23)
BUN/CREAT SERPL: 17.2 (ref 7–25)
CALCIUM SPEC-SCNC: 9 MG/DL (ref 8.6–10.5)
CHLORIDE SERPL-SCNC: 100 MMOL/L (ref 98–107)
CO2 SERPL-SCNC: 28 MMOL/L (ref 22–29)
CREAT SERPL-MCNC: 0.87 MG/DL (ref 0.57–1)
CROSSMATCH INTERPRETATION: NORMAL
DEPRECATED RDW RBC AUTO: 60.5 FL (ref 37–54)
EGFRCR SERPLBLD CKD-EPI 2021: 65.4 ML/MIN/1.73
EOSINOPHIL # BLD MANUAL: 0.32 10*3/MM3 (ref 0–0.4)
EOSINOPHIL NFR BLD MANUAL: 1 % (ref 0.3–6.2)
ERYTHROCYTE [DISTWIDTH] IN BLOOD BY AUTOMATED COUNT: 18.4 % (ref 12.3–15.4)
GLUCOSE BLDC GLUCOMTR-MCNC: 106 MG/DL (ref 70–130)
GLUCOSE BLDC GLUCOMTR-MCNC: 122 MG/DL (ref 70–130)
GLUCOSE BLDC GLUCOMTR-MCNC: 154 MG/DL (ref 70–130)
GLUCOSE BLDC GLUCOMTR-MCNC: 166 MG/DL (ref 70–130)
GLUCOSE SERPL-MCNC: 101 MG/DL (ref 65–99)
HCT VFR BLD AUTO: 26.4 % (ref 34–46.6)
HGB BLD-MCNC: 8.6 G/DL (ref 12–15.9)
LYMPHOCYTES # BLD MANUAL: 4.86 10*3/MM3 (ref 0.7–3.1)
LYMPHOCYTES NFR BLD MANUAL: 5.1 % (ref 5–12)
MACROCYTES BLD QL SMEAR: ABNORMAL
MCH RBC QN AUTO: 30.3 PG (ref 26.6–33)
MCHC RBC AUTO-ENTMCNC: 32.6 G/DL (ref 31.5–35.7)
MCV RBC AUTO: 93 FL (ref 79–97)
MONOCYTES # BLD: 1.62 10*3/MM3 (ref 0.1–0.9)
NEUTROPHILS # BLD AUTO: 24.65 10*3/MM3 (ref 1.7–7)
NEUTROPHILS NFR BLD MANUAL: 77.6 % (ref 42.7–76)
NT-PROBNP SERPL-MCNC: 5511 PG/ML (ref 0–1800)
PLAT MORPH BLD: NORMAL
PLATELET # BLD AUTO: 321 10*3/MM3 (ref 140–450)
PMV BLD AUTO: 11 FL (ref 6–12)
POLYCHROMASIA BLD QL SMEAR: ABNORMAL
POTASSIUM SERPL-SCNC: 4.3 MMOL/L (ref 3.5–5.2)
RBC # BLD AUTO: 2.84 10*6/MM3 (ref 3.77–5.28)
SODIUM SERPL-SCNC: 139 MMOL/L (ref 136–145)
UNIT  ABO: NORMAL
UNIT  RH: NORMAL
VARIANT LYMPHS NFR BLD MANUAL: 15.3 % (ref 19.6–45.3)
WBC MORPH BLD: NORMAL
WBC NRBC COR # BLD: 31.76 10*3/MM3 (ref 3.4–10.8)

## 2023-09-28 PROCEDURE — G0378 HOSPITAL OBSERVATION PER HR: HCPCS

## 2023-09-28 PROCEDURE — 85007 BL SMEAR W/DIFF WBC COUNT: CPT | Performed by: INTERNAL MEDICINE

## 2023-09-28 PROCEDURE — 83880 ASSAY OF NATRIURETIC PEPTIDE: CPT | Performed by: INTERNAL MEDICINE

## 2023-09-28 PROCEDURE — 97530 THERAPEUTIC ACTIVITIES: CPT

## 2023-09-28 PROCEDURE — 99214 OFFICE O/P EST MOD 30 MIN: CPT | Performed by: INTERNAL MEDICINE

## 2023-09-28 PROCEDURE — 63710000001 INSULIN LISPRO (HUMAN) PER 5 UNITS: Performed by: INTERNAL MEDICINE

## 2023-09-28 PROCEDURE — 85025 COMPLETE CBC W/AUTO DIFF WBC: CPT | Performed by: INTERNAL MEDICINE

## 2023-09-28 PROCEDURE — 97166 OT EVAL MOD COMPLEX 45 MIN: CPT

## 2023-09-28 PROCEDURE — 80048 BASIC METABOLIC PNL TOTAL CA: CPT | Performed by: INTERNAL MEDICINE

## 2023-09-28 PROCEDURE — 97162 PT EVAL MOD COMPLEX 30 MIN: CPT

## 2023-09-28 PROCEDURE — 82948 REAGENT STRIP/BLOOD GLUCOSE: CPT

## 2023-09-28 RX ORDER — CLOPIDOGREL BISULFATE 75 MG/1
75 TABLET ORAL DAILY
Status: DISCONTINUED | OUTPATIENT
Start: 2023-09-28 | End: 2023-10-02 | Stop reason: HOSPADM

## 2023-09-28 RX ORDER — UREA 10 %
3 LOTION (ML) TOPICAL NIGHTLY PRN
Status: DISCONTINUED | OUTPATIENT
Start: 2023-09-28 | End: 2023-09-30

## 2023-09-28 RX ORDER — METOPROLOL SUCCINATE 25 MG/1
25 TABLET, EXTENDED RELEASE ORAL 2 TIMES DAILY
Status: DISCONTINUED | OUTPATIENT
Start: 2023-09-28 | End: 2023-09-28

## 2023-09-28 RX ADMIN — Medication 3 MG: at 01:36

## 2023-09-28 RX ADMIN — Medication 10 ML: at 09:26

## 2023-09-28 RX ADMIN — APIXABAN 5 MG: 5 TABLET, FILM COATED ORAL at 21:31

## 2023-09-28 RX ADMIN — Medication 250 MG: at 09:25

## 2023-09-28 RX ADMIN — FUROSEMIDE 40 MG: 40 TABLET ORAL at 09:25

## 2023-09-28 RX ADMIN — INSULIN LISPRO 2 UNITS: 100 INJECTION, SOLUTION INTRAVENOUS; SUBCUTANEOUS at 12:36

## 2023-09-28 RX ADMIN — INSULIN LISPRO 2 UNITS: 100 INJECTION, SOLUTION INTRAVENOUS; SUBCUTANEOUS at 18:00

## 2023-09-28 RX ADMIN — SERTRALINE 100 MG: 100 TABLET, FILM COATED ORAL at 09:25

## 2023-09-28 RX ADMIN — METOPROLOL SUCCINATE 25 MG: 25 TABLET, EXTENDED RELEASE ORAL at 12:37

## 2023-09-28 RX ADMIN — Medication 10 ML: at 21:34

## 2023-09-28 RX ADMIN — CLOPIDOGREL BISULFATE 75 MG: 75 TABLET, FILM COATED ORAL at 15:14

## 2023-09-28 RX ADMIN — MUPIROCIN 1 APPLICATION: 20 OINTMENT TOPICAL at 21:33

## 2023-09-28 RX ADMIN — FAMOTIDINE 20 MG: 20 TABLET, FILM COATED ORAL at 09:25

## 2023-09-28 RX ADMIN — Medication 250 MG: at 21:31

## 2023-09-28 RX ADMIN — LATANOPROST 1 DROP: 50 SOLUTION OPHTHALMIC at 21:31

## 2023-09-28 RX ADMIN — MUPIROCIN 1 APPLICATION: 20 OINTMENT TOPICAL at 15:14

## 2023-09-28 RX ADMIN — LISINOPRIL 2.5 MG: 2.5 TABLET ORAL at 09:25

## 2023-09-28 NOTE — PLAN OF CARE
Goal Outcome Evaluation:  Plan of Care Reviewed With: patient, sibling, daughter           Outcome Evaluation: Pt is a 79 y/o female admitted on 9/27/23 to [facility] for dyspnea, fatigue and anemia. Pt PMHx of myeloproliferative disorder. At baseline pt and family reports she lives alone at [facility] and (I) for ADLs using rollator for fxnl mob. Pt also reports using O2 on 3L @night. Pt is SBA for supine <> sit EOB, using bed rails and VCs for sequencing tech. MIN A for STS from EOB w/ RWX. CGA w/ RWX for fxnl mob to/from bed and BR. Pt denies needing to void. Pt BUE ROM WFL and MMT grossly 3+/5. Pt (I) for sitting bal and CGA for standing bal. Pt tolerated sitting EOB performing B UE ex for shoulder and elbow flex/ ext/ elevation/ protract/retract supination/pronation 2 x 1 reps to increase ROM and strength. Skilled OT req to address the following deficits in strength, endurance/ act tolerance, and SOA. DC rec to home w/ HHOT/PT, OT will continue to monitor.      Anticipated Discharge Disposition (OT): home, home with assist, home with home health

## 2023-09-28 NOTE — PLAN OF CARE
Goal Outcome Evaluation:  Plan of Care Reviewed With: patient           Outcome Evaluation: Pt. is an 83 year old Female admitted to the hospital with Dyspnea.  Pt. reports that prior to admission she was using a Rwx for ambulation.  Pt. currently presents with decreased strength, decreased balance, and decreased tolerance to functional activity.  This AM, pt. able to ambulate 25 feet, CGA x 1, with use of Rwx.  Pt. requires Min. assist x 1 for bed mobility and CGA x 1 for sit <-> stand transfers.  Verbal/tactile cues given during ambulation for posture correction.  Pt. will benefit from skilled inpt. P.T. to address her functional deficits and to assist pt. in regaining her maximum level of independence with functional mobility.      Anticipated Discharge Disposition (PT): assisted living    Patient was not wearing a face mask during this therapy encounter. Therapist used appropriate personal protective equipment including eye protection, mask, and gloves.  Mask used was standard procedure mask. Appropriate PPE was worn during the entire therapy session. Hand hygiene was completed before and after therapy session. Patient is not in enhanced droplet precautions.

## 2023-09-28 NOTE — PROGRESS NOTES
Name: Catherine Boyer ADMIT: 2023   : 1940  PCP: Kristi Moreau APRN    MRN: [id number] LOS: 0 days   AGE/SEX: 83 y.o. female  ROOM: [facility]/     Subjective   Subjective   Patient seen and examined this morning.  Hospital day 1.  Family present at bedside.  At time of my examination, patient is awake, alert, resting in bed.  She continues to endorse ongoing generalized fatigue and weakness, relatively unchanged from prior.  However, she does endorse significant improvement in her breathing today, states that she feels much less short of breath when compared to admission.       Objective   Objective   Vital Signs  Temp:  [97.5 °F (36.4 °C)-98.3 °F (36.8 °C)] 97.5 °F (36.4 °C)  Heart Rate:  [] 87  Resp:  [17-26] 18  BP: ()/(48-71) 121/61  SpO2:  [93 %-96 %] 93 %  on  Flow (L/min):  [2] 2;   Device (Oxygen Therapy): nasal cannula  Body mass index is 25 kg/m².  Physical Exam  Vitals and nursing note reviewed.   Constitutional:       General: She is not in acute distress.     Comments: Elderly, chronically ill-appearing   Eyes:      General: No scleral icterus.     Conjunctiva/sclera: Conjunctivae normal.   Cardiovascular:      Rate and Rhythm: Normal rate and regular rhythm.      Pulses: Normal pulses.      Heart sounds: Normal heart sounds.   Pulmonary:      Effort: Pulmonary effort is normal. No respiratory distress.      Breath sounds: Decreased breath sounds present. No wheezing.   Abdominal:      General: Bowel sounds are normal. There is no distension.      Palpations: Abdomen is soft.      Tenderness: There is no abdominal tenderness.   Musculoskeletal:      Right lower leg: No edema.      Left lower leg: No edema.      Comments: Trace lower extremity edema   Skin:     General: Skin is warm and dry.      Coloration: Skin is pale.      Findings: Bruising (Scattered) present.   Neurological:      Mental Status: She is alert.     Results Review     I reviewed the patient's new clinical  results.  Results from last 7 days   Lab Units 09/28/23  0337 09/27/23  1156   WBC 10*3/mm3 31.76* 48.76*   HEMOGLOBIN g/dL 8.6* 8.0*   PLATELETS 10*3/mm3 321 412     Results from last 7 days   Lab Units 09/28/23  0337 09/27/23  1156   SODIUM mmol/L 139 141   POTASSIUM mmol/L 4.3 4.4   CHLORIDE mmol/L 100 98   CO2 mmol/L 28.0 28.2   BUN mg/dL 15 12   CREATININE mg/dL 0.87 0.77   GLUCOSE mg/dL 101* 100*   EGFR mL/min/1.73 65.4 75.7     Results from last 7 days   Lab Units 09/27/23  1156   ALBUMIN g/dL 4.2   BILIRUBIN mg/dL 0.6   ALK PHOS U/L 137*   AST (SGOT) U/L 20   ALT (SGPT) U/L 15     Results from last 7 days   Lab Units 09/28/23  0337 09/27/23  1156   CALCIUM mg/dL 9.0 9.0   ALBUMIN g/dL  --  4.2   MAGNESIUM mg/dL  --  1.8   PHOSPHORUS mg/dL  --  2.5       Glucose   Date/Time Value Ref Range Status   09/28/2023 1031 166 (H) 70 - 130 mg/dL Final   09/28/2023 0613 106 70 - 130 mg/dL Final   09/27/2023 2128 167 (H) 70 - 130 mg/dL Final   09/27/2023 1901 110 70 - 130 mg/dL Final       XR Chest 1 View    Result Date: 9/27/2023  Negative.  This report was finalized on 9/27/2023 12:04 PM by Dr. Charles Tirado M.D.       I have personally reviewed all medications:  Scheduled Medications  famotidine, 20 mg, Oral, Daily  furosemide, 40 mg, Oral, BID  hydrocortisone-bacitracin-zinc oxide-nystatin, 1 application , Topical, BID  insulin lispro, 2-7 Units, Subcutaneous, 4x Daily AC & at Bedtime  latanoprost, 1 drop, Both Eyes, Nightly  lisinopril, 2.5 mg, Oral, Daily  metoprolol succinate XL, 25 mg, Oral, BID  ruxolitinib, 15 mg, Oral, BID  saccharomyces boulardii, 250 mg, Oral, BID  senna-docusate sodium, 2 tablet, Oral, BID  sertraline, 100 mg, Oral, Daily  sodium chloride, 10 mL, Intravenous, Q12H    Infusions   Diet  Diet: Cardiac Diets, Diabetic Diets; Healthy Heart (2-3 Na+); Consistent Carbohydrate; Texture: Regular Texture (IDDSI 7); Fluid Consistency: Thin (IDDSI 0)    I have personally reviewed:  [x]  Laboratory    [x]  Microbiology   [x]  Radiology   [x]  EKG/Telemetry  [x]  Cardiology/Vascular   []  Pathology    []  Records       Assessment/Plan     Active Hospital Problems    Diagnosis  POA    **Dyspnea [R06.00]  Yes    Elevated troponin [R77.8]  Yes    Hypertension [I10]  Yes    Type 2 diabetes mellitus with circulatory disorder, without long-term current use of insulin [E11.59]  Yes    Paroxysmal atrial fibrillation [I48.0]  Yes    CAD (coronary artery disease) [I25.10]  Yes    Myeloproliferative disorder [D47.1]  Yes    Acute on chronic diastolic heart failure [I50.33]  Yes      Resolved Hospital Problems   No resolved problems to display.       83 y.o. female admitted with Dyspnea.    Acute on chronic HFpEF  Pulmonary hypertension  Elevated troponin  - Most recent 2D Echo prior to admission: (05/06/2023) showing EF 51 to 55%, low normal systolic function, mild septal asymmetric hypertrophy, moderately dilated left atrial cavity, mild aortic stenosis, elevated RVSP at 62 mmHg consistent with severe pulmonary hypertension, mild to moderate mitral regurgitation.  Current outpatient home diuretic: Furosemide 40 mg PO daily. Etiology of acute exacerbation likely 2/2 to increased sodium intake, leading to fluid retention.  - Imaging obtained on admission, reviewed, negative for acute cardiopulmonary abnormalities. ProBNP elevated at 6249.  Troponin slightly elevated but stable on repeat testing, likely a result of heart failure.  EKG showing sinus rhythm and premature atrial complexes, unchanged from prior tracing.  - Following arrival she was given x1 dose of IV Lasix and subsequently had her home dose of Furosemide increased to 40 mg PO BID, which she remains on at present.  Respiratory status appears stable at present, patient states her breathing is significantly improved when compared to admission.   - Plan to continue Furosemide 40 mg BID as currently prescribed. Closely monitor urine output and volume status to guide  "further management.  - Close monitoring of renal function and electrolytes,  2g sodium diet, daily weights, strict I/O, continuous pulse oximetry, telemetry monitoring, elevate HOB, supplemental O2 PRN for O2 sat <90%.    JAK2 positive myeloproliferative disorder  Chronic Leukocytosis  - Hematology/Oncology consulted and following. Per their most recent note: \"Continue Jakafi 15 mg twice daily. We are planning a transition from Jakafi to momelotinib (Ojjaara) as this was just approved by the FDA for use in people with myelofibrosis and worsening anemia. My office is working to get this as it is a newly available drug.\"  - Will continue to follow their plans/recommendations. Greatly appreciate their help.  - WBC elevated, chronic finding, stable at present. Order repeat CBC in AM for reassessment.     Paroxysmal Atrial fibrillation on long term anticoagulation  - [id number] score: 9.  - Vitals, telemetry reviewed, patient appears to be stable, rate controlled on current treatment regimen with Metoprolol Succinate. On Eliquis for AC.  - Continue current treatment as prescribed. Continue telemetry monitoring.    Coronary artery disease  - Patient appears stable from this perspective at this time, denies any chest pain, palpitations, no signs/symptoms to suggest ACS. She is on Lisinopril, Metoprolol Succinate and Plavix.  - Continue current treatments as prescribed. Will continue to monitor.  - Continue telemetry monitoring.    Hypertension  - Blood pressure appears stable and acceptable acutely at this time. No indications to warrant acute changes/intervention at this time.  - Continue current medications as prescribed. Trend BP to guide ongoing management decisions.    Type 2 diabetes mellitus with hyperglycemia  - Most recent A1c: 6.70% (11/28/2022).  - Home medication regimen: None.  - Current treatment regimen: Correctional SSI.  - Per review of POC glucose checks, blood glucose does appear to be controlled " within target inpatient range at this time, and does not warrant changes to insulin regimen.  - Continue current treatment as prescribed with SSI.  - Will monitor 24 hour insulin requirements and adjust as needed to achieve target inpatient range of 140-180.  - Diabetic diet    Anemia  - Hemoglobin low on most recent labs, stable from prior. S/P PRBC transfusion on admission. No evidence of overt blood loss.  Hematology/oncology consulted and following, defer management decisions to them.  - Order repeat CBC in AM for reassessment. Continue to monitor, transfuse for hemoglobin <7.    Generalized fatigue and weakness  - Noted on arrival, etiology likely multifactorial in the setting of myeloproliferative disorder, fluid overload, anemia and other chronic medical issues.  - Treatment for contributing etiologies as discussed elsewhere.  - PT/OT, fall precautions.      SCDs for DVT prophylaxis.  Limited code (no CPR, no intubation).  Discussed with patient, family, nursing staff, CCP, and care team on multidisciplinary rounds.  Anticipate discharge  TBD  timing yet to be determined..      Reuben Pierce DO  [facility]  09/28/23

## 2023-09-28 NOTE — TELEPHONE ENCOUNTER
----- Message from Lidia Frazier RPH sent at 9/26/2023  1:22 PM EDT -----  Regarding: RE: Please schedule patient  Thank you  ----- Message -----  From: Melanie Brown RegSched Rep  Sent: 9/26/2023   1:17 PM EDT  To: Lidia Frazier RPH  Subject: RE: Please schedule patient                      I called the pt's caregiver and she has to talk to a few people in order to get this schedule. She also said transportation was a problem so ask if this could be a video visit. But pt caregiver said she would call me back later today to schedule.    Melanie Torrez   ----- Message -----  From: Josey Vera  Sent: 9/26/2023  11:49 AM EDT  To: Larry Kendall  Subject: FW: Please schedule patient                        ----- Message -----  From: Lidia Frazier RPH  Sent: 9/26/2023  11:30 AM EDT  To: brandon Onc [facility] [place]  [place]  Subject: Please schedule patient                          Please schedule this patient for NEW START EDUCATION -  Ojjaara (momelotinib) with Specialty pharmacy and NP.  Please schedule sometime next week.    Thank you!  Lidia

## 2023-09-28 NOTE — THERAPY EVALUATION
Patient Name: Catherine Boyer  : 1940    MRN: [id number]                              Today's Date: 2023       Admit Date: 2023    Visit Dx:     ICD-10-CM ICD-9-CM   1. Dyspnea, unspecified type  R06.00 786.09   2. Other fatigue  R53.83 780.79   3. Anemia, unspecified type  D64.9 285.9     Patient Active Problem List   Diagnosis    Acute on chronic diastolic heart failure    CAD (coronary artery disease)    Nonbacterial thrombotic endocarditis    Paroxysmal atrial fibrillation    Myeloproliferative disorder    Microcytic anemia    Type 2 diabetes mellitus with circulatory disorder, without long-term current use of insulin    GERD (gastroesophageal reflux disease)    Hypertension    Personal history of transient ischemic attack (TIA), 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 deficits    Porcelain gallbladder    Breast cancer    Chronic diastolic heart failure    Gastritis    Atherosclerosis of abdominal aorta    APC (atrial premature contractions)    Moderate malnutrition    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    KARLI (acute kidney injury)    Intractable nausea and vomiting    Dyspnea    Elevated troponin    TIA (transient ischemic attack)    PUD (peptic ulcer disease)    Anemia     Past Medical History:   Diagnosis Date    Atherosclerosis of abdominal aorta 2023    Atrial fibrillation     Breast cancer     CAD (coronary artery disease)     NSTEMI 2022: 90% ostial LAD, 99% D1, 70% mid-distal LAD (medical therapy). She received two stents (2.5x18 and 2.5x26mm Hernandez LEFTY) but I don't know which one went to which lesion.    Carotid atherosclerosis     Cholecystitis 2022    Added automatically from request for surgery [id number]    Chronic diastolic (congestive) heart failure     COVID 10/29/2022    GERD (gastroesophageal reflux disease)     Glaucoma     History of cataract     Hypertension     Microcytic anemia     per Dr. oleg pate office  note 22-dd    Myeloproliferative disorder     JAK2  positive    Nonbacterial thrombotic endocarditis     6/2021: 4x5mm vegetation on the ventricular surface of the anterior MV, negative blood cultures    Porcelain gallbladder     PUD (peptic ulcer disease)     TIA (transient ischemic attack)     Type 2 diabetes mellitus     Type 2 diabetes mellitus with circulatory disorder, without long-term current use of insulin 07/14/2022    Upper GI bleed      Past Surgical History:   Procedure Laterality Date    BREAST LUMPECTOMY  1999    CARDIAC CATHETERIZATION N/A 09/02/2022    Procedure: Coronary angiography;  Surgeon: Guero Verde MD;  Location: [facility] CATH INVASIVE LOCATION;  Service: Cardiovascular;  Laterality: N/A;    CARDIAC CATHETERIZATION N/A 09/02/2022    Procedure: Stent LEFTY coronary;  Surgeon: Guero Verde MD;  Location: [facility] CATH INVASIVE LOCATION;  Service: Cardiovascular;  Laterality: N/A;    CATARACT EXTRACTION  2011    CHOLECYSTECTOMY      CHOLECYSTECTOMY WITH INTRAOPERATIVE CHOLANGIOGRAM N/A 11/28/2022    Procedure: CHOLECYSTECTOMY LAPAROSCOPIC INTRAOPERATIVE CHOLANGIOGRAM;  Surgeon: Dani Grover Jr., MD;  Location: [facility] MAIN OR;  Service: General;  Laterality: N/A;    COLONOSCOPY N/A 02/09/2023    Procedure: COLONOSCOPY TO CECUM & T.I.;  Surgeon: Guero Ferris MD;  Location: [facility] ENDOSCOPY;  Service: Gastroenterology;  Laterality: N/A;  PRE- MELENA, GI BLEED  POST- DIVERTICULOSIS, INT HEMORRHOIDS    ENDOSCOPY N/A 01/25/2023    Procedure: ESOPHAGOGASTRODUODENOSCOPY WITH BIOPSIES;  Surgeon: Charles Diaz MD;  Location: [facility] ENDOSCOPY;  Service: Gastroenterology;  Laterality: N/A;  pre: abd pain, nausea  post: hiatal hernia, mild gastritis, sloughing of the esophagus    ENTEROSCOPY SMALL BOWEL N/A 02/09/2023    Procedure: ENTEROSCOPY SMALL BOWEL;  Surgeon: Guero Ferris MD;  Location: [facility] ENDOSCOPY;  Service: Gastroenterology;  Laterality: N/A;  PRE- MELENA, GI BLEED  POST- HIATAL HERNIA    JOINT REPLACEMENT      UPPER  GASTROINTESTINAL ENDOSCOPY        General Information       Row Name 09/28/23 1430          OT Time and Intention    Document Type evaluation  -PP     Mode of Treatment individual therapy;occupational therapy  -PP       Row Name 09/28/23 1430          General Information    Patient Profile Reviewed yes  -PP     Prior Level of Function independent:;ADL's;all household mobility;transfer;bed mobility  uses rollator for fxnl mob  -PP     Existing Precautions/Restrictions fall  -PP     Barriers to Rehab none identified  -PP       Row Name 09/28/23 1430          Living Environment    People in Home alone  [facility]ality [facility]  -PP       Row Name 09/28/23 1430          Cognition    Orientation Status (Cognition) oriented x 3  -PP       Row Name 09/28/23 1430          Safety Issues, Functional Mobility    Impairments Affecting Function (Mobility) endurance/activity tolerance;strength;shortness of breath  -PP     Comment, Safety Issues/Impairments (Mobility) Gait belt and non skid socks used to increase safety.  -PP               User Key  (r) = Recorded By, (t) = Taken By, (c) = Cosigned By      Initials Name Provider Type    PP Stewart Maurer OT Occupational Therapist                     Mobility/ADL's       Row Name 09/28/23 1435          Bed Mobility    Bed Mobility supine-sit;sit-supine  -PP     Supine-Sit [place] (Bed Mobility) standby assist  -PP     Sit-Supine [place] (Bed Mobility) standby assist  -PP       Row Name 09/28/23 1435          Transfers    Transfers sit-stand transfer;stand-sit transfer  -PP       Row Name 09/28/23 1435          Sit-Stand Transfer    Sit-Stand [place] (Transfers) minimum assist (75% patient effort);verbal cues  -PP     Assistive Device (Sit-Stand Transfers) walker, standard  -PP     Comment, (Sit-Stand Transfer) VCs for proper hand placement  -PP       Row Name 09/28/23 1435          Stand-Sit Transfer    Stand-Sit [place] (Transfers) contact guard  -PP     Assistive  Device (Stand-Sit Transfers) walker, standard  -PP       Row Name 09/28/23 1435          Functional Mobility    Functional Mobility- Ind. Level contact guard assist  -PP     Functional Mobility- Device walker, standard  -PP     Functional Mobility- Comment ambulated from bed to BR and back to bed.  -PP       Row Name 09/28/23 1435          Lower Body Dressing Assessment/Training    [place] Level (Lower Body Dressing) moderate assist (50% patient effort);maximum assist (25% patient effort);don;socks  -PP       Row Name 09/28/23 1435          Grooming Assessment/Training    [place] Level (Grooming) grooming skills;wash face, hands;set up  -PP     Position (Grooming) edge of bed sitting  -PP               User Key  (r) = Recorded By, (t) = Taken By, (c) = Cosigned By      Initials Name Provider Type    PP Stewart Maurer OT Occupational Therapist                   Obj/Interventions       Row Name 09/28/23 1441          Sensory Assessment (Somatosensory)    Sensory Assessment (Somatosensory) UE sensation intact  -[facility] Name 09/28/23 1441          Vision Assessment/Intervention    Visual Impairment/Limitations WFL  -PP       Row Name 09/28/23 1441          Range of Motion Comprehensive    General Range of Motion other (see comments)  -PP     Comment, General Range of Motion BUE WFL but some limited range in R shoulder cause unknown  -PP       Row Name 09/28/23 1441          Strength Comprehensive (MMT)    Comment, General Manual Muscle Testing (MMT) Assessment BUE grossly 3/5  -       Row Name 09/28/23 1441          Shoulder (Therapeutic Exercise)    Shoulder (Therapeutic Exercise) AROM (active range of motion)  -PP     Shoulder AROM (Therapeutic Exercise) bilateral;scapular elevation;extension;flexion;scapular protraction;scapular retraction;15 repititions;sitting  -PP       Row Name 09/28/23 1441          Elbow/Forearm (Therapeutic Exercise)    Elbow/Forearm (Therapeutic Exercise) AROM (active  range of motion)  -PP     Elbow/Forearm AROM (Therapeutic Exercise) bilateral;flexion;extension;supination;pronation;sitting;15 repititions  -PP       Row Name 09/28/23 1441          Motor Skills    Motor Skills functional endurance  -PP     Functional Endurance fair -  -PP     Therapeutic Exercise shoulder;elbow/forearm  -PP       Row Name 09/28/23 1441          Balance    Balance Assessment sitting static balance;sit to stand dynamic balance;sitting dynamic balance;standing static balance;standing dynamic balance  -PP     Static Sitting Balance independent  -PP     Dynamic Sitting Balance independent  -PP     Position, Sitting Balance unsupported;sitting edge of bed  -PP     Sit to Stand Dynamic Balance minimal assist;1-person assist  -PP     Static Standing Balance standby assist  -PP     Dynamic Standing Balance contact guard  -PP     Position/Device Used, Standing Balance supported;walker, standard  -PP     Balance Interventions standing;sitting;sit to stand;supported;static;dynamic  -PP     Comment, Balance Pt had no LOB noted.  -PP               User Key  (r) = Recorded By, (t) = Taken By, (c) = Cosigned By      Initials Name Provider Type    PP Stewart Maurer OT Occupational Therapist                   Goals/Plan       Row Name 09/28/23 1455          Bed Mobility Goal 1 (OT)    Activity/Assistive Device (Bed Mobility Goal 1, OT) bed mobility activities, all  -PP     [place] Level/Cues Needed (Bed Mobility Goal 1, OT) independent  -PP     Time Frame (Bed Mobility Goal 1, OT) short term goal (STG);2 weeks  -PP     Progress/Outcomes (Bed Mobility Goal 1, OT) goal ongoing  -PP       Row Name 09/28/23 1455          Transfer Goal 1 (OT)    Activity/Assistive Device (Transfer Goal 1, OT) transfers, all  -PP     [place] Level/Cues Needed (Transfer Goal 1, OT) independent  -PP     Time Frame (Transfer Goal 1, OT) 2 weeks;short term goal (STG)  -PP     Progress/Outcome (Transfer Goal 1, OT) goal ongoing   -PP       Row Name 09/28/23 1455          Dressing Goal 1 (OT)    Activity/Device (Dressing Goal 1, OT) dressing skills, all;upper body dressing;lower body dressing  -PP     [place]/Cues Needed (Dressing Goal 1, OT) independent  -PP     Time Frame (Dressing Goal 1, OT) short term goal (STG);2 weeks  -PP     Strategies/Barriers (Dressing Goal 1, OT) using seated dressing tech and AD as needed/trained  -PP     Progress/Outcome (Dressing Goal 1, OT) goal ongoing  -PP       Row Name 09/28/23 1455          Toileting Goal 1 (OT)    Activity/Device (Toileting Goal 1, OT) toileting skills, all;commode;grab bar/safety frame  -PP     [place] Level/Cues Needed (Toileting Goal 1, OT) independent  -PP     Time Frame (Toileting Goal 1, OT) short term goal (STG);2 weeks  -PP     Strategies/Barriers (Toileting Goal 1, OT) w/ RWX  -PP     Progress/Outcome (Toileting Goal 1, OT) goal ongoing  -PP       Row Name 09/28/23 1459          Therapy Assessment/Plan (OT)    Planned Therapy Interventions (OT) activity tolerance training;functional balance retraining;patient/caregiver education/training;strengthening exercise;transfer/mobility retraining;BADL retraining  -PP               User Key  (r) = Recorded By, (t) = Taken By, (c) = Cosigned By      Initials Name Provider Type    PP Stewart Maurer OT Occupational Therapist                   Clinical Impression       Row Name 09/28/23 1449          Pain Assessment    Pretreatment Pain Rating 0/10 - no pain  -PP     Posttreatment Pain Rating 0/10 - no pain  -PP     Pre/Posttreatment Pain Comment Pt had no c/o pain noted  -PP       Row Name 09/28/23 1446          Plan of Care Review    Plan of Care Reviewed With patient;sibling;daughter  -PP     Outcome Evaluation Pt is a 77 y/o female admitted on 9/27/23 to [facility] for dyspnea, fatigue and anemia. Pt PMHx of myeloproliferative disorder. At baseline pt and family reports she lives alone at [facility] and (I) for ADLs using  rollator for fxnl mob. Pt also reports using O2 on 3L @night. Pt is SBA for supine <> sit EOB, using bed rails and VCs for sequencing tech. MIN A for STS from EOB w/ RWX. CGA w/ RWX for fxnl mob to/from bed and BR. Pt denies needing to void. Pt BUE ROM WFL and MMT grossly 3+/5. Pt (I) for sitting bal and CGA for standing bal. Pt tolerated sitting EOB performing B UE ex for shoulder and elbow flex/ ext/ elevation/ protract/retract supination/pronation 2 x 1 reps to increase ROM and strength. Skilled OT req to address the following deficits in strength, endurance/ act tolerance, and SOA. DC rec to home w/ HHOT/PT, OT will continue to monitor.  -PP       Row Name 09/28/23 1445          Therapy Assessment/Plan (OT)    Patient/Family Therapy Goal Statement (OT) To get better and go home.  -PP     Rehab Potential (OT) good, to achieve stated therapy goals  -PP     Criteria for Skilled Therapeutic Interventions Met (OT) yes;skilled treatment is necessary  -PP     Therapy Frequency (OT) 3 times/wk  -PP       Row Name 09/28/23 144          Therapy Plan Review/Discharge Plan (OT)    Anticipated Discharge Disposition (OT) home;home with assist;home with home health  -PP       Row Name 09/28/23 1448          Vital Signs    O2 Delivery Pre Treatment supplemental O2  -PP     O2 Delivery Intra Treatment supplemental O2  -PP     O2 Delivery Post Treatment supplemental O2  -PP     Pre Patient Position Supine  -PP     Intra Patient Position Standing  -PP     Post Patient Position Supine  -PP       Row Name 09/28/23 1441          Positioning and Restraints    Pre-Treatment Position in bed  -PP     Post Treatment Position bed  -PP     In Bed call light within reach;encouraged to call for assist;notified nsg;exit alarm on;patient within staff view;with family/caregiver;fowlers;with other staff  CCP entered room at end of session.  -PP               User Key  (r) = Recorded By, (t) = Taken By, (c) = Cosigned By      Initials Name  Provider Type    PP Stewart Maurer OT Occupational Therapist                   Outcome Measures       Row Name 09/28/23 1456          How much help from another is currently needed...    Putting on and taking off regular lower body clothing? 2  -PP     Bathing (including washing, rinsing, and drying) 3  -PP     Toileting (which includes using toilet bed pan or urinal) 3  -PP     Putting on and taking off regular upper body clothing 3  -PP     Taking care of personal grooming (such as brushing teeth) 3  -PP     Eating meals 4  -PP     AM-PAC 6 Clicks Score (OT) 18  -PP       Row Name 09/28/23 1121          How much help from another person do you currently need...    Turning from your back to your side while in flat bed without using bedrails? 3  -MS     Moving from lying on back to sitting on the side of a flat bed without bedrails? 3  -MS     Moving to and from a bed to a chair (including a wheelchair)? 3  -MS     Standing up from a chair using your arms (e.g., wheelchair, bedside chair)? 3  -MS     Climbing 3-5 steps with a railing? 3  -MS     To walk in hospital room? 3  -MS     AM-PAC 6 Clicks Score (PT) 18  -MS     Highest level of mobility 6 --> Walked 10 steps or more  -MS       Row Name 09/28/23 1456 09/28/23 1121       Functional Assessment    Outcome Measure Options AM-PAC 6 Clicks Daily Activity (OT)  -PP AM-PAC 6 Clicks Basic Mobility (PT)  -MS              User Key  (r) = Recorded By, (t) = Taken By, (c) = Cosigned By      Initials Name Provider Type    Kenton Ng, PT Physical Therapist    PP Stewart Maurer OT Occupational Therapist                    Occupational Therapy Education       Title: PT OT SLP Therapies (In Progress)       Topic: Occupational Therapy (Done)       Point: ADL training (Done)       Description:   Instruct learner(s) on proper safety adaptation and remediation techniques during self care or transfers.   Instruct in proper use of assistive devices.                   Learning Progress Summary             Patient Acceptance, E, VU by PP at 9/28/2023 1457    Comment: Pt educated in OT role, DC planning, HEP and safe xfer tech to increase safety w/ fxnl xfers and UE strength.                         Point: Home exercise program (Done)       Description:   Instruct learner(s) on appropriate technique for monitoring, assisting and/or progressing therapeutic exercises/activities.                  Learning Progress Summary             Patient Acceptance, E, VU by PP at 9/28/2023 1457    Comment: Pt educated in OT role, DC planning, HEP and safe xfer tech to increase safety w/ fxnl xfers and UE strength.                         Point: Precautions (Done)       Description:   Instruct learner(s) on prescribed precautions during self-care and functional transfers.                  Learning Progress Summary             Patient Acceptance, E, VU by PP at 9/28/2023 1457    Comment: Pt educated in OT role, DC planning, HEP and safe xfer tech to increase safety w/ fxnl xfers and UE strength.                         Point: Body mechanics (Done)       Description:   Instruct learner(s) on proper positioning and spine alignment during self-care, functional mobility activities and/or exercises.                  Learning Progress Summary             Patient Acceptance, E, VU by PP at 9/28/2023 1457    Comment: Pt educated in OT role, DC planning, HEP and safe xfer tech to increase safety w/ fxnl xfers and UE strength.                                         User Key       Initials Effective Dates Name Provider Type Discipline    PP 06/09/23 -  Stewart Maurer OT Occupational Therapist OT                  OT Recommendation and Plan  Planned Therapy Interventions (OT): activity tolerance training, functional balance retraining, patient/caregiver education/training, strengthening exercise, transfer/mobility retraining, BADL retraining  Therapy Frequency (OT): 3 times/wk  Plan of Care  Review  Plan of Care Reviewed With: patient, sibling, daughter  Outcome Evaluation: Pt is a 77 y/o female admitted on 9/27/23 to [facility] for dyspnea, fatigue and anemia. Pt PMHx of myeloproliferative disorder. At baseline pt and family reports she lives alone at [facility] and (I) for ADLs using rollator for fxnl mob. Pt also reports using O2 on 3L @night. Pt is SBA for supine <> sit EOB, using bed rails and VCs for sequencing tech. MIN A for STS from EOB w/ RWX. CGA w/ RWX for fxnl mob to/from bed and BR. Pt denies needing to void. Pt BUE ROM WFL and MMT grossly 3+/5. Pt (I) for sitting bal and CGA for standing bal. Pt tolerated sitting EOB performing B UE ex for shoulder and elbow flex/ ext/ elevation/ protract/retract supination/pronation 2 x 1 reps to increase ROM and strength. Skilled OT req to address the following deficits in strength, endurance/ act tolerance, and SOA. DC rec to home w/ HHOT/PT, OT will continue to monitor.     Time Calculation:   Evaluation Complexity (OT)  Review Occupational Profile/Medical/Therapy History Complexity: expanded/moderate complexity  Assessment, Occupational Performance/Identification of Deficit Complexity: 3-5 performance deficits  Clinical Decision Making Complexity (OT): detailed assessment/moderate complexity  Overall Complexity of Evaluation (OT): moderate complexity     Time Calculation- OT       Row Name 09/28/23 1458             Time Calculation- OT    OT Start Time 1032  -PP      OT Stop Time 1051  -PP      OT Time Calculation (min) 19 min  -PP      Total Timed Code Minutes- OT 10 minute(s)  -PP      OT Received On 09/28/23  -PP      OT - Next Appointment 09/29/23  -PP      OT Goal Re-Cert Due Date 10/12/23  -PP         Timed Charges    51999 - OT Therapeutic Activity Minutes 10  -PP         Untimed Charges    OT Eval/Re-eval Minutes 9  -PP         Total Minutes    Timed Charges Total Minutes 10  -PP      Untimed Charges Total Minutes 9  -PP       Total Minutes 19   -PP                User Key  (r) = Recorded By, (t) = Taken By, (c) = Cosigned By      Initials Name Provider Type    PP Stewart Maurer OT Occupational Therapist                  Therapy Charges for Today       Code Description Service Date Service Provider Modifiers Qty    [id number]  OT THERAPEUTIC ACT EA 15 MIN 9/28/2023 Stewart Maurer, OT GO 1    [id number]  OT EVAL MOD COMPLEXITY 2 9/28/2023 Stewart Maurer OT GO 1                 Stewart Maurer OT  9/28/2023

## 2023-09-28 NOTE — CONSULTS
"Nutrition Services    Patient Name:  Catherine Boyer  YOB: 1940  MRN: [id number]  Admit Date:  9/27/2023    Assessment Date:  09/28/23    CLINICAL NUTRITION - EDUCATION     ASSESSMENT  Encounter Information         Consult from Physician     Education topic CHF, Healthy Heart , Sodium    Learner Patient    Learning readiness Motivated to learn    Pertinent nutrition history Eats chinese food containing soy sauce      Anthropometrics         Height Height: 162.6 cm (64.02\")    Weight Weight: 66.1 kg (145 lb 11.6 oz) (09/28/23 0445)    BMI  Body mass index is 25 kg/m².   Overweight (25 - 29.9)    Comments      Medication/Biochemical          Pertinent medications Diuretic      Pertinent lab values Results from last 7 days   Lab Units 09/28/23  0337 09/27/23  1156   SODIUM mmol/L 139 141   POTASSIUM mmol/L 4.3 4.4   CHLORIDE mmol/L 100 98   CO2 mmol/L 28.0 28.2   BUN mg/dL 15 12   CREATININE mg/dL 0.87 0.77   CALCIUM mg/dL 9.0 9.0   BILIRUBIN mg/dL  --  0.6   ALK PHOS U/L  --  137*   ALT (SGPT) U/L  --  15   AST (SGOT) U/L  --  20   GLUCOSE mg/dL 101* 100*       Lab Results   Component Value Date    HGBA1C 6.70 (H) 11/28/2022        DIAGNOSIS  PES Statement         Problem  Food and nutrition knowledge deficit    Etiology Medical Diagnosis - acute on chronic diastolic heart failure     Signs/Symptoms Information deficit     INTERVENTION  Intervention/Evaluation         Goal   Disease management/therapy, Meet nutritional needs for diagnosis/condition, Provide information , Reduce/improve symptoms    Educated regarding Appropriate portions, Beverage choices, Eating pattern, Food choices, Food preparation, Food shopping, Label reading, Seasoning foods, Snacks    Resources given Printed materials, Sample menus    Monitor/evaluation  Per protocol    Discharge planning No discharge needs identified at this time     RD to follow per protocol.     Electronically signed by:  Harriett Simms Dietitian Intern "   09/28/23 08:39 EDT

## 2023-09-28 NOTE — THERAPY EVALUATION
Patient Name: Catherine Boyer  : 1940    MRN: [id number]                              Today's Date: 2023       Admit Date: 2023    Visit Dx:     ICD-10-CM ICD-9-CM   1. Dyspnea, unspecified type  R06.00 786.09   2. Other fatigue  R53.83 780.79   3. Anemia, unspecified type  D64.9 285.9     Patient Active Problem List   Diagnosis    Acute on chronic diastolic heart failure    CAD (coronary artery disease)    Nonbacterial thrombotic endocarditis    Paroxysmal atrial fibrillation    Myeloproliferative disorder    Microcytic anemia    Type 2 diabetes mellitus with circulatory disorder, without long-term current use of insulin    GERD (gastroesophageal reflux disease)    Hypertension    Personal history of transient ischemic attack (TIA), 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 deficits    Porcelain gallbladder    Breast cancer    Chronic diastolic heart failure    Gastritis    Atherosclerosis of abdominal aorta    APC (atrial premature contractions)    Moderate malnutrition    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    KARLI (acute kidney injury)    Intractable nausea and vomiting    Dyspnea    Elevated troponin    TIA (transient ischemic attack)    PUD (peptic ulcer disease)     Past Medical History:   Diagnosis Date    Atherosclerosis of abdominal aorta 2023    Atrial fibrillation     Breast cancer     CAD (coronary artery disease)     NSTEMI 2022: 90% ostial LAD, 99% D1, 70% mid-distal LAD (medical therapy). She received two stents (2.5x18 and 2.5x26mm [place] LEFTY) but I don't know which one went to which lesion.    Carotid atherosclerosis     Cholecystitis 2022    Added automatically from request for surgery [id number]    Chronic diastolic (congestive) heart failure     COVID 10/29/2022    GERD (gastroesophageal reflux disease)     Glaucoma     History of cataract     Hypertension     Microcytic anemia     per Dr. oleg pate office  note 22-dd    Myeloproliferative disorder     JAK2 positive     Nonbacterial thrombotic endocarditis     6/2021: 4x5mm vegetation on the ventricular surface of the anterior MV, negative blood cultures    Porcelain gallbladder     PUD (peptic ulcer disease)     TIA (transient ischemic attack)     Type 2 diabetes mellitus     Type 2 diabetes mellitus with circulatory disorder, without long-term current use of insulin 07/14/2022    Upper GI bleed      Past Surgical History:   Procedure Laterality Date    BREAST LUMPECTOMY  1999    CARDIAC CATHETERIZATION N/A 09/02/2022    Procedure: Coronary angiography;  Surgeon: Guero Verde MD;  Location: [facility] CATH INVASIVE LOCATION;  Service: Cardiovascular;  Laterality: N/A;    CARDIAC CATHETERIZATION N/A 09/02/2022    Procedure: Stent LEFTY coronary;  Surgeon: Guero Verde MD;  Location: [facility] CATH INVASIVE LOCATION;  Service: Cardiovascular;  Laterality: N/A;    CATARACT EXTRACTION  2011    CHOLECYSTECTOMY      CHOLECYSTECTOMY WITH INTRAOPERATIVE CHOLANGIOGRAM N/A 11/28/2022    Procedure: CHOLECYSTECTOMY LAPAROSCOPIC INTRAOPERATIVE CHOLANGIOGRAM;  Surgeon: Dani Grover Jr., MD;  Location: [facility] MAIN OR;  Service: General;  Laterality: N/A;    COLONOSCOPY N/A 02/09/2023    Procedure: COLONOSCOPY TO CECUM & T.I.;  Surgeon: Guero Ferris MD;  Location: [facility] ENDOSCOPY;  Service: Gastroenterology;  Laterality: N/A;  PRE- MELENA, GI BLEED  POST- DIVERTICULOSIS, INT HEMORRHOIDS    ENDOSCOPY N/A 01/25/2023    Procedure: ESOPHAGOGASTRODUODENOSCOPY WITH BIOPSIES;  Surgeon: Charles Diaz MD;  Location: [facility] ENDOSCOPY;  Service: Gastroenterology;  Laterality: N/A;  pre: abd pain, nausea  post: hiatal hernia, mild gastritis, sloughing of the esophagus    ENTEROSCOPY SMALL BOWEL N/A 02/09/2023    Procedure: ENTEROSCOPY SMALL BOWEL;  Surgeon: Guero Ferris MD;  Location: [facility] ENDOSCOPY;  Service: Gastroenterology;  Laterality: N/A;  PRE- MELENA, GI BLEED  POST- HIATAL HERNIA    JOINT REPLACEMENT      UPPER GASTROINTESTINAL  ENDOSCOPY        General Information       Row Name 09/28/23 1118          Physical Therapy Time and Intention    Document Type evaluation  Pt. admitted with Dyspnea  -MS     Mode of Treatment physical therapy;individual therapy  -MS       Row Name 09/28/23 1118          General Information    Patient Profile Reviewed yes  -MS     Prior Level of Function --  Use of Rwx for ambulation  -MS     Existing Precautions/Restrictions fall  -MS     Barriers to Rehab none identified  -MS       Row Name 09/28/23 1118          Cognition    Orientation Status (Cognition) oriented x 3  -MS       Row Name 09/28/23 1118          Safety Issues, Functional Mobility    Comment, Safety Issues/Impairments (Mobility) Gait belt used for safety.  -MS               User Key  (r) = Recorded By, (t) = Taken By, (c) = Cosigned By      Initials Name Provider Type    Kenton Ng PT Physical Therapist                   Mobility       Row Name 09/28/23 1119          Bed Mobility    Bed Mobility supine-sit;sit-supine  -MS     Supine-Sit [place] (Bed Mobility) minimum assist (75% patient effort)  -MS       Row Name 09/28/23 1119          Sit-Stand Transfer    Sit-Stand [place] (Transfers) contact guard  -MS     Assistive Device (Sit-Stand Transfers) walker, front-wheeled  -MS       Row Name 09/28/23 1119          Gait/Stairs (Locomotion)    [place] Level (Gait) contact guard  -MS     Assistive Device (Gait) walker, front-wheeled  -MS     Distance in Feet (Gait) 25 feet  -MS     Deviations/Abnormal Patterns (Gait) jeanna decreased  -MS     Bilateral Gait Deviations forward flexed posture  -MS     Comment, (Gait/Stairs) Verbal/tacitle cues given for posture correction.  -MS               User Key  (r) = Recorded By, (t) = Taken By, (c) = Cosigned By      Initials Name Provider Type    Kenton Ng PT Physical Therapist                   Obj/Interventions       Row Name 09/28/23 1119          Range of Motion  Comprehensive    Comment, General Range of Motion BUE/LE (WFL's)  -MS       Row Name 09/28/23 1119          Strength Comprehensive (MMT)    Comment, General Manual Muscle Testing (MMT) Assessment BUE/LE (3/5)  -MS               User Key  (r) = Recorded By, (t) = Taken By, (c) = Cosigned By      Initials Name Provider Type    MS Song, Kenton KAPLAN, PT Physical Therapist                   Goals/Plan       Row Name 09/28/23 1120          Bed Mobility Goal 1 (PT)    Activity/Assistive Device (Bed Mobility Goal 1, PT) bed mobility activities, all  -MS     [place] Level/Cues Needed (Bed Mobility Goal 1, PT) standby assist  -MS     Time Frame (Bed Mobility Goal 1, PT) long term goal (LTG);1 week  -MS       Row Name 09/28/23 1120          Transfer Goal 1 (PT)    Activity/Assistive Device (Transfer Goal 1, PT) transfers, all;walker, rolling  -MS     [place] Level/Cues Needed (Transfer Goal 1, PT) standby assist  -MS     Time Frame (Transfer Goal 1, PT) long term goal (LTG);1 week  -MS       Row Name 09/28/23 1120          Gait Training Goal 1 (PT)    Activity/Assistive Device (Gait Training Goal 1, PT) gait (walking locomotion);walker, rolling  -MS     [place] Level (Gait Training Goal 1, PT) standby assist  -MS     Distance (Gait Training Goal 1, PT) 100 feet  -MS     Time Frame (Gait Training Goal 1, PT) long term goal (LTG);1 week  -MS               User Key  (r) = Recorded By, (t) = Taken By, (c) = Cosigned By      Initials Name Provider Type    MS Song Kenton KAPLAN, PT Physical Therapist                   Clinical Impression       Row Name 09/28/23 1120          Pain    Pretreatment Pain Rating 0/10 - no pain  -MS     Posttreatment Pain Rating 0/10 - no pain  -MS       Row Name 09/28/23 1120          Plan of Care Review    Plan of Care Reviewed With patient  -MS       Row Name 09/28/23 1120          Therapy Assessment/Plan (PT)    Rehab Potential (PT) good, to achieve stated therapy goals  -MS     Criteria  for Skilled Interventions Met (PT) skilled treatment is necessary  -MS     Therapy Frequency (PT) 6 times/wk  -MS       Row Name 09/28/23 1120          Positioning and Restraints    Pre-Treatment Position in bed  -MS     Post Treatment Position bsc  -MS     On BS commode notified nsg;sitting;call light within reach;encouraged to call for assist;with nsg  -MS               User Key  (r) = Recorded By, (t) = Taken By, (c) = Cosigned By      Initials Name Provider Type    Kenton Ng PT Physical Therapist                   Outcome Measures       Row Name 09/28/23 1121          How much help from another person do you currently need...    Turning from your back to your side while in flat bed without using bedrails? 3  -MS     Moving from lying on back to sitting on the side of a flat bed without bedrails? 3  -MS     Moving to and from a bed to a chair (including a wheelchair)? 3  -MS     Standing up from a chair using your arms (e.g., wheelchair, bedside chair)? 3  -MS     Climbing 3-5 steps with a railing? 3  -MS     To walk in hospital room? 3  -MS     AM-PAC 6 Clicks Score (PT) 18  -MS     Highest level of mobility 6 --> Walked 10 steps or more  -MS       Row Name 09/28/23 1121          Functional Assessment    Outcome Measure Options AM-PAC 6 Clicks Basic Mobility (PT)  -MS               User Key  (r) = Recorded By, (t) = Taken By, (c) = Cosigned By      Initials Name Provider Type    Kenton Ng PT Physical Therapist                                 Physical Therapy Education       Title: PT OT SLP Therapies (In Progress)       Topic: Physical Therapy (In Progress)       Point: Mobility training (Done)       Learning Progress Summary             Patient Acceptance, E,D, VU,NR by MS at 9/28/2023 1121                         Point: Home exercise program (Not Started)       Learner Progress:  Not documented in this visit.              Point: Body mechanics (Done)       Learning Progress Summary              Patient Acceptance, E,D, VU,NR by MS at 9/28/2023 1121                         Point: Precautions (Done)       Learning Progress Summary             Patient Acceptance, E,D, VU,NR by MS at 9/28/2023 1121                                         User Key       Initials Effective Dates Name Provider Type Discipline    MS 06/16/21 -  Kenton Song PT Physical Therapist PT                  PT Recommendation and Plan     Plan of Care Reviewed With: patient  Outcome Evaluation: Pt. is an 83 year old Female admitted to the hospital with Dyspnea.  Pt. reports that prior to admission she was using a Rwx for ambulation.  Pt. currently presents with decreased strength, decreased balance, and decreased tolerance to functional activity.  This AM, pt. able to ambulate 25 feet, CGA x 1, with use of Rwx.  Pt. requires Min. assist x 1 for bed mobility and CGA x 1 for sit <-> stand transfers.  Verbal/tactile cues given during ambulation for posture correction.  Pt. will benefit from skilled inpt. P.T. to address her functional deficits and to assist pt. in regaining her maximum level of independence with functional mobility.     Time Calculation:         PT Charges       Row Name 09/28/23 1124             Time Calculation    Start Time 0848  -MS      Stop Time 0903  -MS      Time Calculation (min) 15 min  -MS      PT Received On 09/28/23  -MS      PT - Next Appointment 09/29/23  -MS      PT Goal Re-Cert Due Date 10/05/23  -MS         Time Calculation- PT    Total Timed Code Minutes- PT 14 minute(s)  -MS                User Key  (r) = Recorded By, (t) = Taken By, (c) = Cosigned By      Initials Name Provider Type    MS Kenton Song PT Physical Therapist                  Therapy Charges for Today       Code Description Service Date Service Provider Modifiers Qty    83239049416  PT EVAL MOD COMPLEXITY 2 9/28/2023 Kenton Song, PT GP 1    [id number]  PT THERAPEUTIC ACT EA 15 MIN 9/28/2023 Kenton Song,  PT GP 1            PT G-Codes  Outcome Measure Options: AM-PAC 6 Clicks Basic Mobility (PT)  AM-PAC 6 Clicks Score (PT): 18  PT Discharge Summary  Anticipated Discharge Disposition (PT): assisted living    Kenton Song, PT  9/28/2023

## 2023-09-28 NOTE — DISCHARGE PLACEMENT REQUEST
"Maribell Boyer (83 y.o. Female)       Date of Birth   1940    Social Security Number       Address   [facility] [address] [address]    Home Phone   [phone]    MRN   [id number]       Rastafari   Unknown    Marital Status                               Admission Date   9/27/23    Admission Type   Emergency    Admitting Provider   Jennie Faye MD    Attending Provider   Reuben Pierce DO    Department, Room/Bed   [address], [address]/1       Discharge Date       Discharge Disposition       Discharge Destination                                 Attending Provider: Reuben Pierce DO    Allergies: Aspirin, Oxycodone, Hydroxyurea, Diphenhydramine Hcl    Isolation: None   Infection: None   Code Status: No CPR    Ht: 162.6 cm (64.02\")   Wt: 66.1 kg (145 lb 11.6 oz)    Admission Cmt: None   Principal Problem: Dyspnea [R06.00]                   Active Insurance as of 9/27/2023       Primary Coverage       Payor Plan Insurance Group Employer/Plan Group    AETNA MEDICARE REPLACEMENT AETNA MEDICARE REPLACEMENT [id number]       Payor Plan Address Payor Plan Phone Number Payor Plan Fax Number Effective Dates    [address] [phone]  1/1/2022 - None Entered    [facility] [id number]         Subscriber Name Subscriber Birth Date Member ID       MARIBELL BOYER 1940 [id number]                     Emergency Contacts        (Rel.) Home Phone Work Phone Mobile Phone    RAY BOYER (Daughter) [phone] -- [phone]    Karan Boyer POA/[facility] (Son) -- -- [phone]    Karen Gutierrez (Daughter) -- -- [phone]    Greg Boyer (Son) -- -- [phone]    MEG BANKS (Brother) -- -- [phone]          "

## 2023-09-28 NOTE — NURSING NOTE
Wound/Ostomy: Consult received regarding multiples skin issue on bilateral lower leg. Patient is alert, oriented, able to turn with assistance, upon assessment is observed  dry skin, scattered scabs and with dry skin to  lower legs and feet/toes. Her daughter at bedside stated she picks at sites and peels skin, Bactroban ointment is ordered.  In addition we could see rash, red in color, itching some time and  satellites lesions with appearence of fungal infection on abdominal fold and bilateral groin, Magic barrier ointment is ordered too.  Wound care order and nursing intervention have been implemented.   Education about the importance of repositioning and minimize any skin contact with urine or stool was provided.  Please re-consult for any additional needs.

## 2023-09-28 NOTE — PROGRESS NOTES
[facility] INPATIENT PROGRESS NOTE    Length of Stay:  0 days    CHIEF COMPLAINT/REASON FOR VISIT:  JAK2 positive MPD. Anemia     SUBJECTIVE:  Transfused 1 unit pRBCs. Afebrile and normotensive to mildly hypotensive. She remains fatigued. Denies pain.      ROS:  14 systems reviewed with pertinent positives and negatives in the HPI. Reviewed today.    OBJECTIVE:  Vitals:    09/28/23 0445 09/28/23 0700 09/28/23 1237 09/28/23 1356   BP:  121/61 124/60 100/51   BP Location:  Right arm  Right arm   Patient Position:  Lying  Lying   Pulse:  87 91 97   Resp:  18     Temp:  97.5 °F (36.4 °C)     TempSrc:  Oral     SpO2:       Weight: 66.1 kg (145 lb 11.6 oz)      Height:             PHYSICAL EXAMINATION:   General: NAD, alert/oriented  Chest/Lungs: CTAB ant  Heart: RRR  Abdomen/GI: soft, NT, ND, NABS, spleen not palpated  Extremities: WWP, LLE bandaged    DIAGNOSTIC DATA:  Results Review:     I reviewed the patient's new clinical results.    Results from last 7 days   Lab Units 09/28/23  0337   WBC 10*3/mm3 31.76*   HEMOGLOBIN g/dL 8.6*   HEMATOCRIT % 26.4*   PLATELETS 10*3/mm3 321     Lab Results   Component Value Date    NEUTROABS 24.65 (H) 09/28/2023     Results from last 7 days   Lab Units 09/28/23  0337   SODIUM mmol/L 139   POTASSIUM mmol/L 4.3   CHLORIDE mmol/L 100   CO2 mmol/L 28.0   BUN mg/dL 15   CREATININE mg/dL 0.87   GLUCOSE mg/dL 101*   CALCIUM mg/dL 9.0     Results from last 7 days   Lab Units 09/27/23  1156   INR  1.48*   APTT seconds 47.9*     Results from last 7 days   Lab Units 09/27/23  1156   MAGNESIUM mg/dL 1.8         IMAGING:    None reviewed    ASSESSMENT:  This is a 83 y.o. female with:     *JAK2 positive myeloproliferative disorder, perhaps primary myelofibrosis  She has been seen by Dr. Gerard Foreman with [facility] specialists and cancer and blood disorders in [place].      She was seen there initially on 6/4/2021 with leukocytosis with a white blood cell count of 104,000.  Hemoglobin was  normal at 11.6 and platelets were normal at 299,000.  PCR for BCR/ABL and FISH for BCR/ABL were negative.  A bone marrow aspiration was attempted on 6/9/2021 which was unsuccessful.  She then went to [place] for the summer.  She was admitted to the [facility] between 6/29/2021 and 7/9/2021.  She had a bone marrow aspiration and biopsy performed there on 6/30/2021 showing a hypercellular marrow at greater than 95% with 1% blasts.  Focal 1+ reticulin fibrosis was noted.  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  PCR for BCR/ABL was negative.  JAK2 V617F PCR was positive.  Cytogenetics were normal.  Next generation myeloid disorder profiling of the bone marrow aspirate from 6/30/2021 showed TET2 D3090L and X2644Hnx*47 abnormalities and JAK2 V617F.  Therefore she was suspected to have early primary myelofibrosis and she was started on hydroxyurea 1000 mg daily.  Subsequently, hydroxyurea was held.  Blood counts had improved.  She was admitted to the hospital from 2/9 through 2/15/2022 for symptomatic anemia and chest pain.  Her hemoglobin was 5.5.  The white blood cell count was 7.6.  Platelets were 56,000.  Fecal occult blood testing was negative.  She received 3 units of packed red blood cells.  No endoscopy was performed.  A left heart catheterization was performed with a drug-eluting stent placed to the second diagonal on 2/11/2022 and she was discharged from that hospitalization on aspirin 81 mg, Plavix 75 mg, and Eliquis 5 mg twice daily.  Blood counts improved by 3/7/2022 and her white blood cell count was 7.2 with a hemoglobin 11.4 and platelets 295,000.  She did have a bone marrow aspiration and biopsy on 3/7/2022 showing chronic myeloproliferative neoplasm with potentially post ET fibrosis or consideration of primary myelofibrosis.  There was no evidence for myelodysplasia.  She has also a history of marantic endocarditis documented on 7/2/2021 by [facility] which showed an anterior  mitral valve vegetation.  She also has atrial fibrillation and is anticoagulated with Eliquis.    Hydroxyurea was discontinued and she was started on ruxolitinib 15 mg twice daily at her visit on 3/14/2022 with Dr. Gerard Foreman.  She had CT imaging of the chest abdomen pelvis on 3/19/2022 that did not demonstrate any splenomegaly.  Calcified mediastinal lymphadenopathy was noted.  Borderline pretracheal and right hilar lymphadenopathy noted.  Small bilateral pleural effusions with bibasilar atelectasis noted.  She has moved from [place] to [place] and is seen initially in the office on 5/15/2022.  She tolerates ruxolitinib well.  White blood cell count has decreased from 5.9 from 19.6.  Platelets have normalized at 170,000, down from 464,000.  The hemoglobin has also decreased at 8.6 with an MCV of 98.9.  5/26/2022: White blood cell count mildly elevated at 12.4 with a normal platelet count at 216,000.  She tolerates Jakafi very well.   6/30/2022: White blood cell count a little higher.  Continue monitoring.  9/13/2022: White blood cell count higher at 32,000  Flow cytometry 9/13/2022 with 0.1% myeloblasts with a typical (normal) phenotype.  Nonspecific monocyte population.  Bone marrow aspiration and biopsy on 10/7/2022 with hypercellular marrow at 95% with involvement by chronic myeloproliferative neoplasm, less than 5% blasts.  Mild reticulin fibrosis.  Decreased iron stores.  Consideration of CMML.  Flow cytometry showed a dim CD56 positive aberrant monocyte population at 8.2 percent of events.  Cytogenetics pending.  NGS pending.  10/26/2022: White blood cell count improved at 21,000.  Patient admitted 10/29/2022 with COVID-19 infection.  Jakafi held.  Resumed Jakafi 15 mg twice daily on 11/1/2022.  WBC stable at 51,000  On 2/17/2023 a CT scan of the abdomen and pelvis showed improved splenomegaly at 14.5 cm, previously 16 cm  4/10/2023: White blood cell count reasonably stable at 66,000  6/26/2023: White  blood cell count stable at 42,000  9/19/2023: White blood cell count stable at 52.98  WBC 31.76, from 48.76     *Microcytic and now normocytic anemia  9/18/2023: Hemoglobin 8.0, .2  Hgb 8.6, from 8.0 after transfusion     *History of marantic endocarditis and atrial fibrillation  Anticoagulated with Eliquis 5 mg twice daily  Hold this due to post-Mohs bleeding     *Recent admission with heart failure, positive stress test and LEFTY to a large diagonal branch for in sent stenosis.  BNP 5511, from 6249, up from 1106, from 2589, from 12,196     *Abnormal gallbladder on CT  Patient had developed epigastric pain at the time of admission  CT abdomen and pelvis 10/29/2022 showed evidence of a porcelain gallbladder  Patient is aware of this diagnosis.  She does not wish to pursue further evaluation.  She did have some brief epigastric/right upper quadrant pain which has now resolved.  Cholecystectomy by Dr. Dani Grover Jr. on 11/28/2022     *COVID-19 infection  Patient presented to ER on 10/29/2022 with progressive generalized weakness x10 days  Patient tested positive for COVID-19 on admission 10/29/2022  Elevated lactate 3.0  Chest x-ray 10/29/2022 with no evidence of consolidation  Patient was started on dexamethasone and remdesivir  Improved     *GI bleeding February 2023  Presented with abdominal pain, melena.  Hemoglobin 6.1 at presentation.  EGD on 1/25/2023 had noted mild mucosal changes characterized by slowing in the lower third of the esophagus.  Patchy mild inflammation characterized by congestion and erythema found in the gastric body.  The examined duodenum was normal.  Multiple biopsies taken.  Suspected bleeding from prior biopsy site  Anticoagulation with Eliquis and Plavix was held.  On PPI  GI follow-up- EGD/push enteroscopy and colonoscopy when Eliquis held 5 days.    Procedures occurred on 2/9/2023.  Nonbleeding internal hemorrhoids.  No source of bleeding identified.     *Elevated  transaminases  Normalized     *Skin lesions  She saw Dr. Fierro at [facility] in Dermatology with biopsies of both locations showing squamous cell carcinoma  Mohs procedure performed on her leg on 8/4     PLAN:   Continue Jakafi 15 mg twice daily for now.  Continue Plavix 75 mg daily.    Eliquis continues  Transfuse prn. No transfusion today though she remains fatigued. Consider tomorrow based on hgb.  PT/OT/wound care following  We are planning a transition from Jakafi to momelotinib (Ojjaara) as this was just approved by the FDA for use in people with myelofibrosis and worsening anemia. My office is working to get this as it is a newly available drug.   Daily CBC while admitted  Will follow. Discussed with the patient    Darrell Reinoso MD

## 2023-09-28 NOTE — H&P
PCP: Kristi Moreau APRN    Chief complaint   Chief Complaint   Patient presents with    Shortness of Breath       HPI  Patient is a 83 y.o. female presents with history of myelodysplastic syndrome occasionally requiring transfusion who presents to the ER due to dyspnea on exertion.  Patient was found to have a hemoglobin of 8.0 and O2 sats of 89% on room air.  Patient has a history of diastolic heart failure and BNP is elevated.  Normally takes Lasix 40 mg a day.  Blood pressure was borderline low therefore not wanting to aggressively diurese.  Hematology 1 to see if diuresis would help the patient before starting to give blood.    Patient admits to eating Chinese food a few days ago.  She has had 3 to 4 days of shortness of breath and generalized weakness.  Denies any sore throat no cough.  No other COVID symptoms.  She is on Eliquis at home.    PAST MEDICAL HISTORY  Past Medical History:   Diagnosis Date    Atherosclerosis of abdominal aorta 02/05/2023    Atrial fibrillation     Breast cancer     CAD (coronary artery disease)     NSTEMI 2/2022: 90% ostial LAD, 99% D1, 70% mid-distal LAD (medical therapy). She received two stents (2.5x18 and 2.5x26mm [place] LEFTY) but I don't know which one went to which lesion.    Carotid atherosclerosis     Cholecystitis 11/22/2022    Added automatically from request for surgery [id number]    Chronic diastolic (congestive) heart failure     COVID 10/29/2022    GERD (gastroesophageal reflux disease)     Glaucoma     History of cataract     Hypertension     Microcytic anemia     per Dr. oleg pate office  note 6/30/22-dd    Myeloproliferative disorder     JAK2 positive    Nonbacterial thrombotic endocarditis     6/2021: 4x5mm vegetation on the ventricular surface of the anterior MV, negative blood cultures    Porcelain gallbladder     PUD (peptic ulcer disease)     TIA (transient ischemic attack)     Type 2 diabetes mellitus     Type 2 diabetes mellitus with circulatory disorder,  without long-term current use of insulin 07/14/2022    Upper GI bleed        PAST SURGICAL HISTORY  Past Surgical History:   Procedure Laterality Date    BREAST LUMPECTOMY  1999    CARDIAC CATHETERIZATION N/A 09/02/2022    Procedure: Coronary angiography;  Surgeon: Guero Verde MD;  Location:  [facility] INVASIVE [facility];  Service: Cardiovascular;  Laterality: N/A;    CARDIAC CATHETERIZATION N/A 09/02/2022    Procedure: Stent LEFTY coronary;  Surgeon: Guero Verde MD;  [facility];  Service: Cardiovascular;  Laterality: N/A;    CATARACT EXTRACTION  2011    CHOLECYSTECTOMY      CHOLECYSTECTOMY WITH INTRAOPERATIVE CHOLANGIOGRAM N/A 11/28/2022    Procedure: CHOLECYSTECTOMY LAPAROSCOPIC INTRAOPERATIVE CHOLANGIOGRAM;  Surgeon: Dani Grover Jr., MD;  Location: [facility] MAIN OR;  Service: General;  Laterality: N/A;    COLONOSCOPY N/A 02/09/2023    Procedure: COLONOSCOPY TO CECUM & T.I.;  Surgeon: Guero Ferris MD;  Location: [facility] ENDOSCOPY;  Service: Gastroenterology;  Laterality: N/A;  PRE- MELENA, GI BLEED  POST- DIVERTICULOSIS, INT HEMORRHOIDS    ENDOSCOPY N/A 01/25/2023    Procedure: ESOPHAGOGASTRODUODENOSCOPY WITH BIOPSIES;  Surgeon: Charles Diaz MD;  Location: [facility] ENDOSCOPY;  Service: Gastroenterology;  Laterality: N/A;  pre: abd pain, nausea  post: hiatal hernia, mild gastritis, sloughing of the esophagus    ENTEROSCOPY SMALL BOWEL N/A 02/09/2023    Procedure: ENTEROSCOPY SMALL BOWEL;  Surgeon: Guero Ferris MD;  Location: [facility] ENDOSCOPY;  Service: Gastroenterology;  Laterality: N/A;  PRE- MELENA, GI BLEED  POST- HIATAL HERNIA    JOINT REPLACEMENT      UPPER GASTROINTESTINAL ENDOSCOPY         FAMILY HISTORY  Family History   Problem Relation Age of Onset    Ovarian cancer Mother     Lung cancer Father     Lung cancer Sister     Malig Hyperthermia Neg Hx        SOCIAL HISTORY  Social History     Tobacco Use    Smoking status: Former     Packs/day: 1.00     Years:  "20.00     Pack years: 20.00     Types: Cigarettes     Passive exposure: Past    Smokeless tobacco: Never    Tobacco comments:     30  years ago   Vaping Use    Vaping Use: Never used   Substance Use Topics    Alcohol use: Yes     Comment: \"rare\"    Drug use: Never       MEDICATIONS:  Medications Prior to Admission   Medication Sig Dispense Refill Last Dose    acetaminophen (TYLENOL) 500 MG tablet Take 1 tablet by mouth As Needed.   9/26/2023    apixaban (ELIQUIS) 5 MG tablet tablet Take 1 tablet by mouth 2 (Two) Times a Day. 180 tablet 3 9/26/2023    clopidogrel (PLAVIX) 75 MG tablet TAKE 1 TABLET DAILY 30 tablet 11 9/26/2023    famotidine (PEPCID) 20 MG tablet Take 1 tablet by mouth Daily.   9/26/2023    furosemide (LASIX) 40 MG tablet TAKE 1 TABLET DAILY 90 tablet 3 9/26/2023    Jakafi 15 MG tablet Take 1 tablet by mouth 2 (Two) Times a Day. 60 tablet 3 9/27/2023    latanoprost (XALATAN) 0.005 % ophthalmic solution Administer 1 drop to both eyes.   9/26/2023    lisinopril (PRINIVIL,ZESTRIL) 2.5 MG tablet Take 1 tablet by mouth Daily.   9/26/2023    loperamide (IMODIUM) 2 MG capsule Take 1 capsule by mouth 4 (Four) Times a Day As Needed for Diarrhea. 120 capsule 0 Past Month    metoprolol succinate XL (TOPROL-XL) 25 MG 24 hr tablet Take 1 tablet by mouth 2 (Two) Times a Day. 20 tablet 0 9/27/2023    ondansetron (Zofran) 4 MG tablet Take 1 tablet by mouth Every 8 (Eight) Hours As Needed for Nausea or Vomiting. 90 tablet 0 9/26/2023    saccharomyces boulardii (FLORASTOR) 250 MG capsule Take 1 capsule by mouth 2 (Two) Times a Day. 60 capsule 0 9/26/2023    sertraline (ZOLOFT) 100 MG tablet Take 1 tablet by mouth Daily.   9/26/2023    simethicone (MYLICON) 80 MG chewable tablet Chew 1 tablet by mouth 4 (Four) Times a Day As Needed for Flatulence. 100 tablet 0 9/26/2023    doxycycline (MONODOX) 100 MG capsule    Unknown    HYDROcodone-acetaminophen (NORCO) 5-325 MG per tablet Take  by mouth See Admin Instructions. Take " "1 tablet by mouth every 4-6 hours as needed for pain   More than a month    hydrocortisone-bacitracin-zinc oxide-nystatin (MAGIC BARRIER) Apply 1 application topically to the appropriate area as directed 2 (Two) Times a Day.       levoFLOXacin (LEVAQUIN) 250 MG tablet    More than a month    mupirocin (BACTROBAN) 2 % ointment 1 application  As Needed.       pantoprazole (PROTONIX) 40 MG EC tablet 1 tablet Daily.       traZODone (DESYREL) 50 MG tablet Take 0.5-1 tablets by mouth every night at bedtime.          Allergies:  Aspirin, Oxycodone, Hydroxyurea, and Diphenhydramine hcl    Review of Systems:  Fatigue, malaise, dyspnea on exertion, arthritis   Negative for following (except as per HPI):  Constitution: chills, fevers,   Eyes: change of vision, loss of vision and discharge  ENT: ear drainage, ear ringing and facial trauma  Respiratory: cough, pleuritic pain,   Cardiovascular: chest pressure, pain, lower extremity edema, palpitations  Gastrointestinal: constipation, diarrhea, nausea, vomiting, pain    Integument: rash and wound  Hematologic / Lymphatic: excessive bleeding and easy bruising  Musculoskeletal: joint pain, joint stiffness, joint swelling and muscle pain  Neurological: headaches, numbness, seizures and tremors  Behavioral / Psych: anxiety, depression and hallucinations       Vital Signs  Temp:  [97.9 °F (36.6 °C)-98.3 °F (36.8 °C)] 97.9 °F (36.6 °C)  Heart Rate:  [] 94  Resp:  [17-26] 18  BP: ()/(48-71) 131/71  Flowsheet Rows      Flowsheet Row First Filed Value   Admission Height 162.6 cm (64.02\") Documented at 09/27/2023 1219   Admission Weight 68.6 kg (151 lb 3.2 oz) Documented at 09/27/2023 1219           Body mass index is 25.94 kg/m².  89% on RA, 96 % on 2L    Physical Exam:  General Appearance:    Alert, cooperative, in no acute distress   Head:    Normocephalic, without obvious abnormality, atraumatic   Eyes:         conjunctivae and sclerae normal, no icterus, PERRLA   ENT:    " Ears grossly intact, oral mucosa moist,   Neck:   No adenopathy, supple, trachea midline,        Lungs:   Decreased breath sounds in the bases with crackles bibasilar ,respirations regular, even and   moderately labored, tachypneic    Heart:  Regular rate and rhythm no murmur, normal S1 and S2,   Abdomen:     Normal bowel sounds, no masses,  soft non-tender, non-distended, obese   Extremities:   Moves all extremities well, no cyanosis, no edema,             Pulses:   Pulses palpable and equal bilaterally   Skin:   No bleeding, rash, bruising    Neurologic:    Psych:   Cranial nerves 2 - 12 grossly intact, sensation grossly intact,     Moves all extremities well, equal bilateral strength 4/5    Alert and Oriented x 3, Normal Affect    I washed/sanitized my hands before entering the room and immediately upon leaving the room.    LABS:  Admission on 09/27/2023   Component Date Value Ref Range Status    Glucose 09/27/2023 100 (H)  65 - 99 mg/dL Final    BUN 09/27/2023 12  8 - 23 mg/dL Final    Creatinine 09/27/2023 0.77  0.57 - 1.00 mg/dL Final    Sodium 09/27/2023 141  136 - 145 mmol/L Final    Potassium 09/27/2023 4.4  3.5 - 5.2 mmol/L Final    Chloride 09/27/2023 98  98 - 107 mmol/L Final    CO2 09/27/2023 28.2  22.0 - 29.0 mmol/L Final    Calcium 09/27/2023 9.0  8.6 - 10.5 mg/dL Final    Total Protein 09/27/2023 6.7  6.0 - 8.5 g/dL Final    Albumin 09/27/2023 4.2  3.5 - 5.2 g/dL Final    ALT (SGPT) 09/27/2023 15  1 - 33 U/L Final    AST (SGOT) 09/27/2023 20  1 - 32 U/L Final    Alkaline Phosphatase 09/27/2023 137 (H)  39 - 117 U/L Final    Total Bilirubin 09/27/2023 0.6  0.0 - 1.2 mg/dL Final    Globulin 09/27/2023 2.5  gm/dL Final    A/G Ratio 09/27/2023 1.7  g/dL Final    BUN/Creatinine Ratio 09/27/2023 15.6  7.0 - 25.0 Final    Anion Gap 09/27/2023 14.8  5.0 - 15.0 mmol/L Final    eGFR 09/27/2023 75.7  >60.0 mL/min/1.73 Final    Protime 09/27/2023 18.2 (H)  11.7 - 14.2 Seconds Final    INR 09/27/2023 1.48 (H)   0.90 - 1.10 Final    PTT 09/27/2023 47.9 (H)  22.7 - 35.4 seconds Final    ABO Type 09/27/2023 O   Final    RH type 09/27/2023 Positive   Final    Antibody Screen 09/27/2023 Negative   Final    T&S Expiration Date 09/27/2023 9/30/2023 11:59:59 PM   Final    HS Troponin T 09/27/2023 46 (H)  <10 ng/L Final    QT Interval 09/27/2023 408  ms Final    QTC Interval 09/27/2023 483  ms Final    WBC 09/27/2023 48.76 (C)  3.40 - 10.80 10*3/mm3 Final    RBC 09/27/2023 2.63 (L)  3.77 - 5.28 10*6/mm3 Final    Hemoglobin 09/27/2023 8.0 (L)  12.0 - 15.9 g/dL Final    Hematocrit 09/27/2023 25.2 (L)  34.0 - 46.6 % Final    MCV 09/27/2023 95.8  79.0 - 97.0 fL Final    MCH 09/27/2023 30.4  26.6 - 33.0 pg Final    MCHC 09/27/2023 31.7  31.5 - 35.7 g/dL Final    RDW 09/27/2023 18.0 (H)  12.3 - 15.4 % Final    RDW-SD 09/27/2023 62.2 (H)  37.0 - 54.0 fl Final    MPV 09/27/2023 11.0  6.0 - 12.0 fL Final    Platelets 09/27/2023 412  140 - 450 10*3/mm3 Final    proBNP 09/27/2023 6,249.0 (H)  0.0 - 1,800.0 pg/mL Final    Neutrophil % 09/27/2023 78.8 (H)  42.7 - 76.0 % Final    Lymphocyte % 09/27/2023 8.1 (L)  19.6 - 45.3 % Final    Monocyte % 09/27/2023 7.1  5.0 - 12.0 % Final    Eosinophil % 09/27/2023 1.0  0.3 - 6.2 % Final    Basophil % 09/27/2023 2.0 (H)  0.0 - 1.5 % Final    Metamyelocyte % 09/27/2023 2.0 (H)  0.0 - 0.0 % Final    Myelocyte % 09/27/2023 1.0 (H)  0.0 - 0.0 % Final    Neutrophils Absolute 09/27/2023 38.42 (H)  1.70 - 7.00 10*3/mm3 Final    Lymphocytes Absolute 09/27/2023 3.95 (H)  0.70 - 3.10 10*3/mm3 Final    Monocytes Absolute 09/27/2023 3.46 (H)  0.10 - 0.90 10*3/mm3 Final    Eosinophils Absolute 09/27/2023 0.49 (H)  0.00 - 0.40 10*3/mm3 Final    Basophils Absolute 09/27/2023 0.98 (H)  0.00 - 0.20 10*3/mm3 Final    RBC Morphology 09/27/2023 Normal  Normal Final    WBC Morphology 09/27/2023 Normal  Normal Final    Platelet Morphology 09/27/2023 Normal  Normal Final    Magnesium 09/27/2023 1.8  1.6 - 2.4 mg/dL Final     Phosphorus 09/27/2023 2.5  2.5 - 4.5 mg/dL Final    HS Troponin T 09/27/2023 43 (H)  <10 ng/L Final    HS Troponin T 09/27/2023 47 (H)  <10 ng/L Final    Troponin T Delta 09/27/2023 4 (C)  >=-4 - <+4 ng/L Final    Product Code 09/27/2023 [id number]   Final    Unit Number 09/27/2023 [id number]   Final    UNIT  ABO 09/27/2023 O   Final    UNIT  RH 09/27/2023 POS   Final    Crossmatch Interpretation 09/27/2023 Compatible   Final    Dispense Status 09/27/2023 IS   Final    Blood Expiration Date 09/27/2023 [id number]   Final    Blood Type Barcode 09/27/2023 5100   Final    Glucose 09/27/2023 110  70 - 130 mg/dL Final    Glucose 09/27/2023 167 (H)  70 - 130 mg/dL Final         DIAGNOSTICS:  XR Chest 1 View    Result Date: 9/27/2023  Negative.  This report was finalized on 9/27/2023 12:04 PM by Dr. Charles Tirado M.D.     TECHNIQUE: Portable chest x-ray correlated with chest x-ray August 28, 2023.  FINDINGS: Advanced arthritic change of both shoulders. Cardiomediastinal  contours are normal. Vascular volume is normal. Lungs appear clear and  the costophrenic sulci are dry.  IMPRESSION:  Negative.       Results Review:   I reviewed the patient's new clinical results.  Discussed with ER physician  Old records reviewed / Medical Decision Making High Complexity  I personally viewed and interpreted the patient's EKG/Telemetry data- sinus rhythm with PACs      ASSESSMENT AND PLAN    Dyspnea    CAD (coronary artery disease)    Paroxysmal atrial fibrillation    Myeloproliferative disorder    Type 2 diabetes mellitus with circulatory disorder, without long-term current use of insulin    Hypertension    Elevated troponin    Generalized weakness, fatigue and some dyspnea on exertion for 3-4 days  -Likely to be multifactorial but sounds like the patient may have eaten a high sodium load and retain some fluid.  She did admit to some PND but denies orthopnea.  But possibly secondary to low hemoglobin.  And patient has  myelodysplastic syndrome.      Myeloproliferative disorder  -consult oncology  -gets transfusions occasionally  -on Jakafi      Type 2 diabetes mellitus with circulatory disorder, without long-term current use of insulin  --Accu-Cheks  -hypoglycemia protocol  -sliding scale insulin to cover outlier blood sugars      Paroxysmal atrial fibrillation  -on eliquis and BB    Borderline low bp and needing to diurese     Elev trop,   - on eliquis.      GFR 50  On home 02    Chronic medical conditions being monitored- stable, continue medical management  -CAD (coronary artery disease)    +DVT proph:    eliquis  + CODE STATUS: Full     I discussed the patient's findings and my recommendations with the patient and/or family.  Please reference all orders placed.    Jennie Faye MD  09/27/23  21:54 EDT      This document is intended for medical expert use only. Reading of this document by patients and/or patient's family without participating in medical staff guidance may result in misinterpretation and unintended morbidity. Any interpretation of such data is the responsibility of the patient and/or family member responsible for the patient in concert with their primary or specialist providers, and NOT to be left for sources of online searches such as [employer], [employer] or similar queries. Relying on these approaches to knowledge may result in misinterpretation, misguided goals of care and even death should patients or family members try recommendations outside of the realm of professional medical care in a supervised way.    Dictated utilizing Voice dictation:  Parts of this note may be an electronic transcription/translation of spoke language to printed text using Dragon dictation system.

## 2023-09-28 NOTE — CASE MANAGEMENT/SOCIAL WORK
Discharge Planning Assessment  [facility]     Patient Name: Catherine Boyer  MRN: [id number]  Today's Date: 9/28/2023    Admit Date: 9/27/2023    Plan: Return to Assisted Living at [facility] with    Discharge Needs Assessment       Row Name 09/28/23 1053       Living Environment    People in Home alone    Current Living Arrangements assisted living facility    Potentially Unsafe Housing Conditions none    Primary Care Provided by self    Provides Primary Care For no one    Family Caregiver if Needed child(margarette), adult    Quality of Family Relationships helpful;involved    Able to Return to Prior Arrangements yes       Resource/Environmental Concerns    Resource/Environmental Concerns none       Food Insecurity    Within the past 12 months, you worried that your food would run out before you got the money to buy more. Never true    Within the past 12 months, the food you bought just didn't last and you didn't have money to get more. Never true       Transition Planning    Patient/Family Anticipates Transition to home  [place]    Patient/Family Anticipated Services at Transition home health care    Transportation Anticipated family or friend will provide       Discharge Needs Assessment    Readmission Within the Last 30 Days no previous admission in last 30 days    Current Outpatient/Agency/Support Group homecare agency    Equipment Currently Used at Home oxygen;rollator;grab bar;bath bench    Concerns to be Addressed denies needs/concerns at this time    Equipment Needed After Discharge none    Outpatient/Agency/Support Group Needs homecare agency    Discharge Facility/Level of Care Needs home with home health    Provided Post Acute Provider List? N/A    N/A Provider List Comment [facility]                   Discharge Plan       Row Name 09/28/23 1056       Plan    Plan Return to Assisted Living at [facility] with     Patient/Family in Agreement with Plan yes    Plan Comments Spoke  with pt and pt's daughter Ila lexii. Confirmed facesheet correct. Explained role of CCP. Pt lives in assisted living at [facility]. She needs assistance with ADLs and staff provides. Pt uses a rollator, bath bench, grab bars and has Home O2 from [employer]. Pt reports she is current with [facility] at [place]. She has no SNF history. Verified PCP and pharmacy. She plans to return to assisted living. PT/OT pending.                  Continued Care and Services - Admitted Since 9/27/2023       Home Medical Care       Service Provider Request Status Selected Services Address Phone Fax Patient Preferred    [facility] Pending - Request Sent N/A [address], [address], [facility] [id number] [phone] --                  Selected Continued Care - Episodes Includes continued care and service providers with selected services from the active episodes listed below      Oncology Episode start date: 5/18/2022   There are no active outsourced providers for this episode.                    Demographic Summary    No documentation.                  Functional Status    No documentation.                  Psychosocial    No documentation.                  Abuse/Neglect    No documentation.                  Legal    No documentation.                  Substance Abuse    No documentation.                  Patient Forms    No documentation.                     LUZ MARIA Patterson

## 2023-09-29 LAB
ANION GAP SERPL CALCULATED.3IONS-SCNC: 10.5 MMOL/L (ref 5–15)
ANISOCYTOSIS BLD QL: ABNORMAL
BACTERIA UR QL AUTO: ABNORMAL /HPF
BASOPHILS # BLD MANUAL: 0.31 10*3/MM3 (ref 0–0.2)
BASOPHILS NFR BLD MANUAL: 1 % (ref 0–1.5)
BILIRUB UR QL STRIP: NEGATIVE
BUN SERPL-MCNC: 20 MG/DL (ref 8–23)
BUN/CREAT SERPL: 20.6 (ref 7–25)
CALCIUM SPEC-SCNC: 8.9 MG/DL (ref 8.6–10.5)
CHLORIDE SERPL-SCNC: 98 MMOL/L (ref 98–107)
CLARITY UR: ABNORMAL
CO2 SERPL-SCNC: 28.5 MMOL/L (ref 22–29)
COLOR UR: YELLOW
CREAT SERPL-MCNC: 0.97 MG/DL (ref 0.57–1)
DACRYOCYTES BLD QL SMEAR: ABNORMAL
DEPRECATED RDW RBC AUTO: 63 FL (ref 37–54)
EGFRCR SERPLBLD CKD-EPI 2021: 57.4 ML/MIN/1.73
EOSINOPHIL # BLD MANUAL: 0.31 10*3/MM3 (ref 0–0.4)
EOSINOPHIL NFR BLD MANUAL: 1 % (ref 0.3–6.2)
ERYTHROCYTE [DISTWIDTH] IN BLOOD BY AUTOMATED COUNT: 18.4 % (ref 12.3–15.4)
GLUCOSE BLDC GLUCOMTR-MCNC: 108 MG/DL (ref 70–130)
GLUCOSE BLDC GLUCOMTR-MCNC: 109 MG/DL (ref 70–130)
GLUCOSE BLDC GLUCOMTR-MCNC: 118 MG/DL (ref 70–130)
GLUCOSE BLDC GLUCOMTR-MCNC: 126 MG/DL (ref 70–130)
GLUCOSE BLDC GLUCOMTR-MCNC: 128 MG/DL (ref 70–130)
GLUCOSE SERPL-MCNC: 113 MG/DL (ref 65–99)
GLUCOSE UR STRIP-MCNC: NEGATIVE MG/DL
HBA1C MFR BLD: 6.6 % (ref 4.8–5.6)
HCT VFR BLD AUTO: 28.5 % (ref 34–46.6)
HGB BLD-MCNC: 9.2 G/DL (ref 12–15.9)
HGB UR QL STRIP.AUTO: ABNORMAL
HYALINE CASTS UR QL AUTO: ABNORMAL /LPF
KETONES UR QL STRIP: NEGATIVE
LEUKOCYTE ESTERASE UR QL STRIP.AUTO: ABNORMAL
LYMPHOCYTES # BLD MANUAL: 5.42 10*3/MM3 (ref 0.7–3.1)
LYMPHOCYTES NFR BLD MANUAL: 9.3 % (ref 5–12)
MACROCYTES BLD QL SMEAR: ABNORMAL
MCH RBC QN AUTO: 30.7 PG (ref 26.6–33)
MCHC RBC AUTO-ENTMCNC: 32.3 G/DL (ref 31.5–35.7)
MCV RBC AUTO: 95 FL (ref 79–97)
MONOCYTES # BLD: 2.88 10*3/MM3 (ref 0.1–0.9)
NEUTROPHILS # BLD AUTO: 22.01 10*3/MM3 (ref 1.7–7)
NEUTROPHILS NFR BLD MANUAL: 71.1 % (ref 42.7–76)
NITRITE UR QL STRIP: NEGATIVE
OVALOCYTES BLD QL SMEAR: ABNORMAL
PH UR STRIP.AUTO: 7 [PH] (ref 5–8)
PLAT MORPH BLD: NORMAL
PLATELET # BLD AUTO: 314 10*3/MM3 (ref 140–450)
PMV BLD AUTO: 11.3 FL (ref 6–12)
POIKILOCYTOSIS BLD QL SMEAR: ABNORMAL
POLYCHROMASIA BLD QL SMEAR: ABNORMAL
POTASSIUM SERPL-SCNC: 4.1 MMOL/L (ref 3.5–5.2)
PROT UR QL STRIP: NEGATIVE
RBC # BLD AUTO: 3 10*6/MM3 (ref 3.77–5.28)
RBC # UR STRIP: ABNORMAL /HPF
REF LAB TEST METHOD: ABNORMAL
SODIUM SERPL-SCNC: 137 MMOL/L (ref 136–145)
SP GR UR STRIP: 1.01 (ref 1–1.03)
SPHEROCYTES BLD QL SMEAR: ABNORMAL
SQUAMOUS #/AREA URNS HPF: ABNORMAL /HPF
UROBILINOGEN UR QL STRIP: ABNORMAL
VARIANT LYMPHS NFR BLD MANUAL: 17.5 % (ref 19.6–45.3)
WBC # UR STRIP: ABNORMAL /HPF
WBC MORPH BLD: NORMAL
WBC NRBC COR # BLD: 30.95 10*3/MM3 (ref 3.4–10.8)

## 2023-09-29 PROCEDURE — G0378 HOSPITAL OBSERVATION PER HR: HCPCS

## 2023-09-29 PROCEDURE — 85007 BL SMEAR W/DIFF WBC COUNT: CPT | Performed by: INTERNAL MEDICINE

## 2023-09-29 PROCEDURE — 82948 REAGENT STRIP/BLOOD GLUCOSE: CPT

## 2023-09-29 PROCEDURE — 83036 HEMOGLOBIN GLYCOSYLATED A1C: CPT | Performed by: STUDENT IN AN ORGANIZED HEALTH CARE EDUCATION/TRAINING PROGRAM

## 2023-09-29 PROCEDURE — 97530 THERAPEUTIC ACTIVITIES: CPT

## 2023-09-29 PROCEDURE — 99214 OFFICE O/P EST MOD 30 MIN: CPT | Performed by: NURSE PRACTITIONER

## 2023-09-29 PROCEDURE — 87086 URINE CULTURE/COLONY COUNT: CPT | Performed by: STUDENT IN AN ORGANIZED HEALTH CARE EDUCATION/TRAINING PROGRAM

## 2023-09-29 PROCEDURE — 81001 URINALYSIS AUTO W/SCOPE: CPT | Performed by: STUDENT IN AN ORGANIZED HEALTH CARE EDUCATION/TRAINING PROGRAM

## 2023-09-29 PROCEDURE — 99214 OFFICE O/P EST MOD 30 MIN: CPT | Performed by: INTERNAL MEDICINE

## 2023-09-29 PROCEDURE — 85025 COMPLETE CBC W/AUTO DIFF WBC: CPT | Performed by: INTERNAL MEDICINE

## 2023-09-29 PROCEDURE — 80048 BASIC METABOLIC PNL TOTAL CA: CPT | Performed by: INTERNAL MEDICINE

## 2023-09-29 RX ADMIN — APIXABAN 5 MG: 5 TABLET, FILM COATED ORAL at 20:56

## 2023-09-29 RX ADMIN — Medication 250 MG: at 20:53

## 2023-09-29 RX ADMIN — FUROSEMIDE 40 MG: 40 TABLET ORAL at 18:48

## 2023-09-29 RX ADMIN — Medication 250 MG: at 09:58

## 2023-09-29 RX ADMIN — SERTRALINE 100 MG: 100 TABLET, FILM COATED ORAL at 09:58

## 2023-09-29 RX ADMIN — FUROSEMIDE 40 MG: 40 TABLET ORAL at 09:58

## 2023-09-29 RX ADMIN — LISINOPRIL 2.5 MG: 2.5 TABLET ORAL at 09:58

## 2023-09-29 RX ADMIN — Medication 10 ML: at 09:59

## 2023-09-29 RX ADMIN — Medication 10 ML: at 22:32

## 2023-09-29 RX ADMIN — FAMOTIDINE 20 MG: 20 TABLET, FILM COATED ORAL at 09:58

## 2023-09-29 RX ADMIN — LATANOPROST 1 DROP: 50 SOLUTION OPHTHALMIC at 20:56

## 2023-09-29 RX ADMIN — MUPIROCIN 1 APPLICATION: 20 OINTMENT TOPICAL at 20:56

## 2023-09-29 RX ADMIN — CLOPIDOGREL BISULFATE 75 MG: 75 TABLET, FILM COATED ORAL at 09:58

## 2023-09-29 RX ADMIN — APIXABAN 5 MG: 5 TABLET, FILM COATED ORAL at 09:58

## 2023-09-29 RX ADMIN — MUPIROCIN 1 APPLICATION: 20 OINTMENT TOPICAL at 09:59

## 2023-09-29 NOTE — CONSULTS
Patient Name: Catherine Boyer  :1940  83 y.o.    Date of Admission: 2023  Date of Consultation:  23  Encounter Provider: ABEL Carbajal  Place of Service: [facility] CARDIOLOGY  Referring Provider: Jennie Faye, *  Patient Care Team:  Kristi Moreau APRN as PCP - General (Nurse Practitioner)  Gerard Foreman MD as Referring Physician (Medical Oncology)  Darrell Reinoso MD as Consulting Physician (Hematology and Oncology)  Albin Barriga MD as Cardiologist (Cardiology)  Richard Aldana, RN as       Chief complaint: SOA, anemia     History of Present Illness: Catherine Boyer is an 83 year old woman followed by Dr. Barriga for conronary artery disease status post intervention to the LAD And diagonal branch in 2022. She had instent restenosis in 2022 and was treated with repeat stenting to the diagonal branch. She has a chronically occluded RCA that is medically managed. She has hypertension, paroxysmal atrial fibrillation on apixaban, diabetes mellitus type II and previous TIA.     She had an abnormal echo in [place] in 2021 at which time she reportedly had a mitral valve mass (possible vegetation vs thrombus). Blood cultures were negative and she was diagnosed with marantic endocarditis.     She has myelodysplastic syndrome and is followed by hematology. She has had problems with severe anemia. Apixaban has been temporarily held the past. It was most recently restarted on July. She saw Dr. Barriga  and was doing well on apixaban and clopidogrel without signs of bleeding.    She came to the emergency room with anemia and dyspnea on exertion. Hgb 8. She got one dose of IV lasix 20 mg on . Oral diuretics then restarted. She was transfused a unit of blood. She is feeling a lot better over all and her breathing has improved.     Echo 2023    Left ventricular systolic function is low normal. Left ventricular  ejection fraction appears to be 51 - 55%.    Left ventricular wall thickness is consistent with mild septal asymmetric hypertrophy.    The left atrial cavity is moderately dilated.    Mild aortic valve stenosis is present.    Peak velocity of the flow distal to the aortic valve is 202 cm/s. Aortic valve maximum pressure gradient is 16 mmHg. Aortic valve mean pressure gradient is 9 mmHg. Aortic valve dimensionless index is 0.8 .    Estimated right ventricular systolic pressure from tricuspid regurgitation is markedly elevated (>55 mmHg). Calculated right ventricular systolic pressure from tricuspid regurgitation is 62 mmHg.    Severe pulmonary hypertension is present.    Mild dilation of the ascending aorta is present. 3.7 cm.    Mild to moderate mitral regurgitation is noted.    Cardiac cath 9/2/2022  Findings:  1. Coronary Artery Anatomy:  Dominance: Right  Left Main: Angiographically normal  Left Anterior Descending: Moderate caliber size.  Proximal segment is tortuous does have a 40% segment stenoses followed by a long 50 to 60% mid segment stenoses.  There is a large diagonal branch which contains a patent stent within its ostium.  There is a 99% in-stent Ying stenoses noted.  TREY II flow distally.  Circumflex Artery: Moderate caliber size.  Contains luminal regularities throughout.  Right Coronary Artery: 100% occlusion of the mid segment with left-to-right collaterals filling the distal vascular bed  Conclusions:  Severe multivessel coronary artery disease with 99% in-stent restenosis of diagonal stent, 60% proximal to mid LAD segment stenoses, 100% occlusion of mid right coronary artery with left-to-right collaterals filling the distal vascular bed, and luminal regularities throughout circumflex territory.  Successful revascularization of in-stent restenosis with placement of a 2.25 x 15 mm Xience drug-eluting stent deployed at 20 joan.  No residual stenoses and restoration of TREY-3 flow distally.      Recommendations:   Patient was loaded with clopidogrel in the Cath Lab will defer aspirin use given documented allergy and concomitant Eliquis use.  Patient may restart Eliquis tomorrow  Would continue medical management of LAD disease.  Lesions are somewhat borderline and the distal LAD is actually very small caliber size and distribution.     Past Medical History:   Diagnosis Date    Atherosclerosis of abdominal aorta 02/05/2023    Atrial fibrillation     Breast cancer     CAD (coronary artery disease)     NSTEMI 2/2022: 90% ostial LAD, 99% D1, 70% mid-distal LAD (medical therapy). She received two stents (2.5x18 and 2.5x26mm Finesse LEFTY) but I don't know which one went to which lesion.    Carotid atherosclerosis     Cholecystitis 11/22/2022    Added automatically from request for surgery [id number]    Chronic diastolic (congestive) heart failure     COVID 10/29/2022    GERD (gastroesophageal reflux disease)     Glaucoma     History of cataract     Hypertension     Microcytic anemia     per Dr. oleg pate office  note 6/30/22-dd    Myeloproliferative disorder     JAK2 positive    Nonbacterial thrombotic endocarditis     6/2021: 4x5mm vegetation on the ventricular surface of the anterior MV, negative blood cultures    Porcelain gallbladder     PUD (peptic ulcer disease)     TIA (transient ischemic attack)     Type 2 diabetes mellitus     Type 2 diabetes mellitus with circulatory disorder, without long-term current use of insulin 07/14/2022    Upper GI bleed        Past Surgical History:   Procedure Laterality Date    BREAST LUMPECTOMY  1999    CARDIAC CATHETERIZATION N/A 09/02/2022    Procedure: Coronary angiography;  Surgeon: Guero Verde MD;  Location:  [facility];  Service: Cardiovascular;  Laterality: N/A;    CARDIAC CATHETERIZATION N/A 09/02/2022    Procedure: Stent LEFTY coronary;  Surgeon: Guero Verde MD;  Location:  [facility];  Service: Cardiovascular;   Laterality: N/A;    CATARACT EXTRACTION  2011    CHOLECYSTECTOMY      CHOLECYSTECTOMY WITH INTRAOPERATIVE CHOLANGIOGRAM N/A 11/28/2022    Procedure: CHOLECYSTECTOMY LAPAROSCOPIC INTRAOPERATIVE CHOLANGIOGRAM;  Surgeon: Dani Grover Jr., MD;  Location: [facility] MAIN OR;  Service: General;  Laterality: N/A;    COLONOSCOPY N/A 02/09/2023    Procedure: COLONOSCOPY TO CECUM & T.I.;  Surgeon: Guero Ferris MD;  Location:  [facility];  Service: Gastroenterology;  Laterality: N/A;  PRE- MELENA, GI BLEED  POST- DIVERTICULOSIS, INT HEMORRHOIDS    ENDOSCOPY N/A 01/25/2023    Procedure: ESOPHAGOGASTRODUODENOSCOPY WITH BIOPSIES;  Surgeon: Charles Diaz MD;  Location:  [facility];  Service: Gastroenterology;  Laterality: N/A;  pre: abd pain, nausea  post: hiatal hernia, mild gastritis, sloughing of the esophagus    ENTEROSCOPY SMALL BOWEL N/A 02/09/2023    Procedure: ENTEROSCOPY SMALL BOWEL;  Surgeon: Guero Ferris MD;  Location: [facility] END[facility]COPY;  Service: Gastroenterology;  Laterality: N/A;  PRE- MELENA, GI BLEED  POST- HIATAL HERNIA    JOINT REPLACEMENT      UPPER GASTROINTESTINAL ENDOSCOPY           Prior to Admission medications    Medication Sig Start Date End Date Taking? Authorizing Provider   acetaminophen (TYLENOL) 500 MG tablet Take 1 tablet by mouth As Needed.   Yes Marya Olivas MD   apixaban (ELIQUIS) 5 MG tablet tablet Take 1 tablet by mouth 2 (Two) Times a Day. 7/25/23  Yes Estrellita Blacno APRN   clopidogrel (PLAVIX) 75 MG tablet TAKE 1 TABLET DAILY 2/14/23  Yes Estrellita Blanco APRN   famotidine (PEPCID) 20 MG tablet Take 1 tablet by mouth Daily. 5/16/23  Yes Kenton Lopez MD   furosemide (LASIX) 40 MG tablet TAKE 1 TABLET DAILY 7/5/23  Yes Albin Barriga MD   Jakafi 15 MG tablet Take 1 tablet by mouth 2 (Two) Times a Day. 6/19/23  Yes Darrell Reinoso MD   latanoprost (XALATAN) 0.005 % ophthalmic solution Administer 1 drop to both eyes. 3/14/22  Yes Marya Olivas MD    lisinopril (PRINIVIL,ZESTRIL) 2.5 MG tablet Take 1 tablet by mouth Daily.   Yes Marya Olivas MD   loperamide (IMODIUM) 2 MG capsule Take 1 capsule by mouth 4 (Four) Times a Day As Needed for Diarrhea. 5/12/23  Yes Kenton Meeks MD   metoprolol succinate XL (TOPROL-XL) 25 MG 24 hr tablet Take 1 tablet by mouth 2 (Two) Times a Day. 3/1/23  Yes Estrellita Blanco APRN   ondansetron (Zofran) 4 MG tablet Take 1 tablet by mouth Every 8 (Eight) Hours As Needed for Nausea or Vomiting. 2/28/23  Yes Annika Guzman MD   saccharomyces boulardii (FLORASTOR) 250 MG capsule Take 1 capsule by mouth 2 (Two) Times a Day. 5/12/23  Yes Kenton Meeks MD   sertraline (ZOLOFT) 100 MG tablet Take 1 tablet by mouth Daily. 4/5/22  Yes Marya Olivas MD   simethicone (MYLICON) 80 MG chewable tablet Chew 1 tablet by mouth 4 (Four) Times a Day As Needed for Flatulence. 2/11/23  Yes Anthony Muñoz MD   HYDROcodone-acetaminophen (NORCO) 5-325 MG per tablet Take  by mouth See Admin Instructions. Take 1 tablet by mouth every 4-6 hours as needed for pain 8/4/23   Marya Olivas MD   hydrocortisone-bacitracin-zinc oxide-nystatin (MAGIC BARRIER) Apply 1 application topically to the appropriate area as directed 2 (Two) Times a Day. 5/16/23   Kenton Lopez MD   pantoprazole (PROTONIX) 40 MG EC tablet 1 tablet Daily. 6/17/23   Marya Olivas MD   traZODone (DESYREL) 50 MG tablet Take 0.5-1 tablets by mouth every night at bedtime. 2/28/23   Marya Olivas MD       Allergies   Allergen Reactions    Aspirin Unknown - Low Severity    Oxycodone Unknown - Low Severity    Hydroxyurea Other (See Comments)     Her hemoglobin drop and was stop in February 2022 and start taking Jakafi    Diphenhydramine Hcl Anxiety and Unknown (See Comments)     Benadryl IVP       Social History     Socioeconomic History    Marital status:    Tobacco Use    Smoking status: Former     Packs/day: 1.00      "Years: 20.00     Pack years: 20.00     Types: Cigarettes     Passive exposure: Past    Smokeless tobacco: Never    Tobacco comments:     30  years ago   Vaping Use    Vaping Use: Never used   Substance and Sexual Activity    Alcohol use: Yes     Comment: \"rare\"    Drug use: Never    Sexual activity: Defer       Family History   Problem Relation Age of Onset    Ovarian cancer Mother     Lung cancer Father     Lung cancer Sister     Malig Hyperthermia Neg Hx        REVIEW OF SYSTEMS:   All systems reviewed.  Pertinent positives identified in HPI.  All other systems are negative.      Objective:     Vitals:    09/28/23 2005 09/28/23 2336 09/29/23 0500 09/29/23 0743   BP: 126/61 119/79  128/64   BP Location: Right arm Right arm  Right arm   Patient Position: Lying Lying  Sitting   Pulse: 87 81  80   Resp: 18 18  18   Temp: 99.9 °F (37.7 °C) 98.1 °F (36.7 °C)  97.9 °F (36.6 °C)   TempSrc: Oral Oral  Oral   SpO2: 95% 96%  93%   Weight:   66 kg (145 lb 9.6 oz)    Height:         Body mass index is 24.98 kg/m².    Physical Exam:  Constitutional: She is oriented to person, place, and time. She appears well-developed. She does not appear ill.   HENT:   Head: Normocephalic and atraumatic. Head is without contusion.   Right Ear: Hearing normal. No drainage.   Left Ear: Hearing normal. No drainage.   Nose: No nasal deformity. No epistaxis.   Eyes: Lids are normal. Right eye exhibits no exudate. Left eye exhibits no exudate.  Neck: No JVD present.   Cardiovascular: Normal rate, regular rhythm and normal heart sounds.    Pulses:       Posterior tibial pulses are 2+ on the right side, and 2+ on the left side.   Pulmonary/Chest: Effort normal and breath sounds normal.   Abdominal: Soft. Normal appearance and bowel sounds are normal. There is no tenderness.   Musculoskeletal: Normal range of motion.        Right shoulder: She exhibits no deformity.        Left shoulder: She exhibits no deformity.   Neurological: She is alert and " oriented to person, place, and time. She has normal strength.   Skin: Skin is warm, dry and intact. No rash noted.   Psychiatric: She has a normal mood and affect. Her behavior is normal. Thought content normal.   Vitals reviewed      Lab Review:     Results from last 7 days   Lab Units 09/29/23  0341 09/28/23  0337 09/27/23  1156   SODIUM mmol/L 137   < > 141   POTASSIUM mmol/L 4.1   < > 4.4   CHLORIDE mmol/L 98   < > 98   CO2 mmol/L 28.5   < > 28.2   BUN mg/dL 20   < > 12   CREATININE mg/dL 0.97   < > 0.77   CALCIUM mg/dL 8.9   < > 9.0   BILIRUBIN mg/dL  --   --  0.6   ALK PHOS U/L  --   --  137*   ALT (SGPT) U/L  --   --  15   AST (SGOT) U/L  --   --  20   GLUCOSE mg/dL 113*   < > 100*    < > = values in this interval not displayed.     Results from last 7 days   Lab Units 09/27/23  1800 09/27/23  1554 09/27/23  1156   HSTROP T ng/L 47* 43* 46*     Results from last 7 days   Lab Units 09/29/23  0341   WBC 10*3/mm3 30.95*   HEMOGLOBIN g/dL 9.2*   HEMATOCRIT % 28.5*   PLATELETS 10*3/mm3 314     Results from last 7 days   Lab Units 09/27/23  1156   INR  1.48*   APTT seconds 47.9*     Results from last 7 days   Lab Units 09/27/23  1156   MAGNESIUM mg/dL 1.8         Current Facility-Administered Medications:     acetaminophen (TYLENOL) tablet 650 mg, 650 mg, Oral, Q4H PRN **OR** acetaminophen (TYLENOL) 160 MG/5ML oral solution 650 mg, 650 mg, Oral, Q4H PRN **OR** acetaminophen (TYLENOL) suppository 650 mg, 650 mg, Rectal, Q4H PRN, Jennie Faye MD    albuterol (PROVENTIL) nebulizer solution 0.083% 2.5 mg/3mL, 2.5 mg, Nebulization, Q6H PRN, Jennie Faye MD    apixaban (ELIQUIS) tablet 5 mg, 5 mg, Oral, BID, Reuben Pierce, DO, 5 mg at 09/29/23 0958    sennosides-docusate (PERICOLACE) 8.6-50 MG per tablet 2 tablet, 2 tablet, Oral, BID **AND** polyethylene glycol (MIRALAX) packet 17 g, 17 g, Oral, Daily PRN **AND** bisacodyl (DULCOLAX) EC tablet 5 mg, 5 mg, Oral, Daily PRN **AND** bisacodyl (DULCOLAX)  suppository 10 mg, 10 mg, Rectal, Daily PRN, Jennie Faye MD    calcium carbonate (TUMS) chewable tablet 500 mg (200 mg elemental), 2 tablet, Oral, BID PRN, Jennie Faye MD    Calcium Replacement - Follow Nurse / BPA Driven Protocol, , Does not apply, PRN, Reuben Pierce DO    clopidogrel (PLAVIX) tablet 75 mg, 75 mg, Oral, Daily, Reuben Pierce DO, 75 mg at 09/29/23 0958    dextrose (D50W) (25 g/50 mL) IV injection 25 g, 25 g, Intravenous, Q15 Min PRN, Jennie Faye MD    dextrose (GLUTOSE) oral gel 15 g, 15 g, Oral, Q15 Min PRN, Jennie Faye MD    famotidine (PEPCID) tablet 20 mg, 20 mg, Oral, Daily, Jennie Faye MD, 20 mg at 09/29/23 0958    furosemide (LASIX) tablet 40 mg, 40 mg, Oral, BID, Jennie Faye MD, 40 mg at 09/29/23 0958    glucagon (GLUCAGEN) injection 1 mg, 1 mg, Intramuscular, Q15 Min PRN, Jennie Faye MD    hydrocortisone-bacitracin-zinc oxide-nystatin (MAGIC BARRIER) ointment 1 application , 1 application , Topical, BID, Jennie Faye MD, 1 application  at 09/29/23 0959    insulin lispro (HUMALOG/ADMELOG) injection 2-7 Units, 2-7 Units, Subcutaneous, 4x Daily AC & at Bedtime, Jennie Faye MD, 2 Units at 09/28/23 1800    latanoprost (XALATAN) 0.005 % ophthalmic solution 1 drop, 1 drop, Both Eyes, Nightly, Jennie Faye MD, 1 drop at 09/28/23 2131    lisinopril (PRINIVIL,ZESTRIL) tablet 2.5 mg, 2.5 mg, Oral, Daily, Jennie Faye MD, 2.5 mg at 09/29/23 0958    Magnesium Standard Dose Replacement - Follow Nurse / BPA Driven Protocol, , Does not apply, PRN, Reuben Pierce,     melatonin tablet 3 mg, 3 mg, Oral, Nightly PRN, Melanie Botello, APRN, 3 mg at 09/28/23 0136    morphine injection 2 mg, 2 mg, Intravenous, Q4H PRN **AND** naloxone (NARCAN) injection 0.4 mg, 0.4 mg, Intravenous, Q5 Min PRN, Jennie Faye MD    mupirocin (BACTROBAN) 2 % ointment 1 application , 1  application , Topical, Q12H, Reuben Pierce DO, 1 application  at 09/29/23 0959    nitroglycerin (NITROSTAT) SL tablet 0.4 mg, 0.4 mg, Sublingual, Q5 Min PRN, Jennie Faye MD    ondansetron (ZOFRAN) tablet 4 mg, 4 mg, Oral, Q6H PRN **OR** ondansetron (ZOFRAN) injection 4 mg, 4 mg, Intravenous, Q6H PRN, Jennie Faye MD    Phosphorus Replacement - Follow Nurse / BPA Driven Protocol, , Does not apply, PRN, Reuben Pierce DO    Potassium Replacement - Follow Nurse / BPA Driven Protocol, , Does not apply, PRN, Reuben Pierce DO    ruxolitinib (JAKAFI) tablet 15 mg, 15 mg, Oral, BID, Darrell Reinoso MD, 15 mg at 09/29/23 1003    saccharomyces boulardii (FLORASTOR) capsule 250 mg, 250 mg, Oral, BID, Jennie Faye MD, 250 mg at 09/29/23 0958    sertraline (ZOLOFT) tablet 100 mg, 100 mg, Oral, Daily, Jennie Faye MD, 100 mg at 09/29/23 0958    simethicone (MYLICON) chewable tablet 80 mg, 80 mg, Oral, 4x Daily PRN, Jennie Faye MD    [COMPLETED] Insert Peripheral IV, , , Once **AND** sodium chloride 0.9 % flush 10 mL, 10 mL, Intravenous, PRN, Jennie Faye MD    sodium chloride 0.9 % flush 10 mL, 10 mL, Intravenous, Q12H, Jennie Faye MD, 10 mL at 09/29/23 0959    sodium chloride 0.9 % flush 10 mL, 10 mL, Intravenous, PRN, Jennie Faye MD    sodium chloride 0.9 % infusion 40 mL, 40 mL, Intravenous, PRN, Jennie Faye MD    Assessment and Plan:       Acute on chronic diastolic heart failure due to dietary indiscretion possible high output due to anemia - status post one dose of IV diuretics on admission. Now on oral and appears stable.  JAK2 positive myeloproliferative disorder   Anemia status post transfusion  Coronary artery disease with previous stent to LAD and diagonal branch then in stent stenosis of the diagonal branch a few months later . 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 On clopidogrel.  Paroxysmal atrial  fibrillation - on apixaban.   History of marantic endocarditis of the mitral valve  History of GI bleeding - if she has recurrent bleeding, may have to stop apixaban completely.   Severe pulmonary hypertension    Juli Brandt, APRN  09/29/23  12:57 EDT

## 2023-09-29 NOTE — PLAN OF CARE
Goal Outcome Evaluation:  Plan of Care Reviewed With: patient        Progress: improving  Outcome Evaluation: Pt was found supine in bed, pleasant and agreeable to OOB ax this PM. She sits EOB with SBA and stands with CGA using RW. She performs fxl ambulation around room to simulate household distances, no LOB during task. Slightly SOB but recovers quickly. She completes simple grooming task standing at the sink with spv, UB therex sitting EOB (~2# weighted basin, 10x2 sets). Pt demo's some improvement in overall function, OT will cont to follow

## 2023-09-29 NOTE — PROGRESS NOTES
Name: Catherine Boyer ADMIT: 2023   : 1940  PCP: Kristi Moreau APRN    MRN: [id number] LOS: 0 days   AGE/SEX: 83 y.o. female  ROOM: [facility]     Subjective   Subjective   Patient seen and examined this morning.  Hospital day 2.  She states she feels much better today, not as tired or weak and breathing has significantly improved.      Objective   Objective   Vital Signs  Temp:  [97.5 °F (36.4 °C)-99.9 °F (37.7 °C)] 98.1 °F (36.7 °C)  Heart Rate:  [81-99] 81  Resp:  [18] 18  BP: ()/(34-79) 119/79  SpO2:  [95 %-96 %] 96 %  on  Flow (L/min):  [2] 2;   Device (Oxygen Therapy): nasal cannula  Body mass index is 24.98 kg/m².  Physical Exam  Vitals and nursing note reviewed.   Constitutional:       General: She is not in acute distress.     Comments: Elderly, chronically ill-appearing   Eyes:      General: No scleral icterus.     Conjunctiva/sclera: Conjunctivae normal.   Cardiovascular:      Rate and Rhythm: Normal rate and regular rhythm.      Pulses: Normal pulses.      Heart sounds: Normal heart sounds.   Pulmonary:      Effort: Pulmonary effort is normal. No respiratory distress.      Breath sounds: No wheezing.   Abdominal:      General: Bowel sounds are normal. There is no distension.      Palpations: Abdomen is soft.      Tenderness: There is no abdominal tenderness.   Musculoskeletal:      Right lower leg: No edema.      Left lower leg: No edema.      Comments: Trace lower extremity edema   Skin:     General: Skin is warm and dry.      Coloration: Skin is pale.      Findings: Bruising (Scattered) present.   Neurological:      Mental Status: She is alert.     Results Review     I reviewed the patient's new clinical results.  Results from last 7 days   Lab Units 23  03423  03323  1156   WBC 10*3/mm3 30.95* 31.76* 48.76*   HEMOGLOBIN g/dL 9.2* 8.6* 8.0*   PLATELETS 10*3/mm3 314 321 412       Results from last 7 days   Lab Units 23  0341 23  0337 23  1156    SODIUM mmol/L 137 139 141   POTASSIUM mmol/L 4.1 4.3 4.4   CHLORIDE mmol/L 98 100 98   CO2 mmol/L 28.5 28.0 28.2   BUN mg/dL 20 15 12   CREATININE mg/dL 0.97 0.87 0.77   GLUCOSE mg/dL 113* 101* 100*   EGFR mL/min/1.73 57.4* 65.4 75.7       Results from last 7 days   Lab Units 09/27/23  1156   ALBUMIN g/dL 4.2   BILIRUBIN mg/dL 0.6   ALK PHOS U/L 137*   AST (SGOT) U/L 20   ALT (SGPT) U/L 15       Results from last 7 days   Lab Units 09/29/23  0341 09/28/23  0337 09/27/23  1156   CALCIUM mg/dL 8.9 9.0 9.0   ALBUMIN g/dL  --   --  4.2   MAGNESIUM mg/dL  --   --  1.8   PHOSPHORUS mg/dL  --   --  2.5         Glucose   Date/Time Value Ref Range Status   09/29/2023 0809 118 70 - 130 mg/dL Final   09/29/2023 0625 108 70 - 130 mg/dL Final   09/28/2023 2059 122 70 - 130 mg/dL Final   09/28/2023 1647 154 (H) 70 - 130 mg/dL Final   09/28/2023 1031 166 (H) 70 - 130 mg/dL Final   09/28/2023 0613 106 70 - 130 mg/dL Final   09/27/2023 2128 167 (H) 70 - 130 mg/dL Final       XR Chest 1 View    Result Date: 9/27/2023  Negative.  This report was finalized on 9/27/2023 12:04 PM by Dr. Charles Tirado M.D.       I have personally reviewed all medications:  Scheduled Medications  apixaban, 5 mg, Oral, BID  clopidogrel, 75 mg, Oral, Daily  famotidine, 20 mg, Oral, Daily  furosemide, 40 mg, Oral, BID  hydrocortisone-bacitracin-zinc oxide-nystatin, 1 application , Topical, BID  insulin lispro, 2-7 Units, Subcutaneous, 4x Daily AC & at Bedtime  latanoprost, 1 drop, Both Eyes, Nightly  lisinopril, 2.5 mg, Oral, Daily  mupirocin, 1 application , Topical, Q12H  ruxolitinib, 15 mg, Oral, BID  saccharomyces boulardii, 250 mg, Oral, BID  senna-docusate sodium, 2 tablet, Oral, BID  sertraline, 100 mg, Oral, Daily  sodium chloride, 10 mL, Intravenous, Q12H    Infusions   Diet  Diet: Cardiac Diets, Diabetic Diets; Healthy Heart (2-3 Na+); Consistent Carbohydrate; Texture: Regular Texture (IDDSI 7); Fluid Consistency: Thin (IDDSI 0)    I have  personally reviewed:  [x]  Laboratory   [x]  Microbiology   [x]  Radiology   [x]  EKG/Telemetry  [x]  Cardiology/Vascular   []  Pathology    []  Records       Assessment/Plan     Active Hospital Problems    Diagnosis  POA    **Dyspnea [R06.00]  Yes    Anemia [D64.9]  Yes    Elevated troponin [R77.8]  Yes    Hypertension [I10]  Yes    GERD (gastroesophageal reflux disease) [K21.9]  Yes    Type 2 diabetes mellitus with circulatory disorder, without long-term current use of insulin [E11.59]  Yes    Paroxysmal atrial fibrillation [I48.0]  Yes    CAD (coronary artery disease) [I25.10]  Yes    Myeloproliferative disorder [D47.1]  Yes    Acute on chronic diastolic heart failure [I50.33]  Yes      Resolved Hospital Problems   No resolved problems to display.       83 y.o. female admitted with Dyspnea.    Acute on chronic HFpEF  Pulmonary hypertension  Elevated troponin  - Most recent 2D Echo prior to admission: (05/06/2023) showing EF 51 to 55%, low normal systolic function, mild septal asymmetric hypertrophy, moderately dilated left atrial cavity, mild aortic stenosis, elevated RVSP at 62 mmHg consistent with severe pulmonary hypertension, mild to moderate mitral regurgitation.  Current outpatient home diuretic: Furosemide 40 mg PO daily. Etiology of acute exacerbation likely 2/2 to increased sodium intake, leading to fluid retention.  - Imaging obtained on admission, reviewed, negative for acute cardiopulmonary abnormalities. ProBNP elevated at 6249.  Troponin slightly elevated but stable on repeat testing, likely a result of heart failure.  EKG showing sinus rhythm and premature atrial complexes, unchanged from prior tracing.  - Following arrival she was given x1 dose of IV Lasix and subsequently had her home dose of Furosemide increased to 40 mg PO BID, which she remains on at present.  Respiratory status appears stable at present, patient states her breathing is significantly improved when compared to admission.   -  "Cardiology consulted and following. [place] that patient stable at present. Will continue to follow their plans/recommendations. Greatly appreciate their help.  - Plan to continue Furosemide 40 mg BID as currently prescribed.    JAK2 positive myeloproliferative disorder  Chronic Leukocytosis  - Hematology/Oncology consulted and followed. Per their most recent note: \"Continue Jakafi 15 mg twice daily. We are planning a transition from Jakafi to momelotinib (Ojjaara) as this was just approved by the FDA for use in people with myelofibrosis and worsening anemia. My office is working to get this as it is a newly available drug.\"  - Most recent note reviewed, they have now signed off. Plan for outpatient follow up, appreciate their help.  - WBC elevated, chronic finding, stable at present. Order repeat CBC in AM for reassessment.     Paroxysmal Atrial fibrillation on long term anticoagulation  - [id number] score: 9.  - Vitals, telemetry reviewed, patient appears to be stable, rate controlled on current treatment regimen with Metoprolol Succinate. On Eliquis for AC.  - Continue current treatment as prescribed. Continue telemetry monitoring.    Coronary artery disease  - Patient appears stable from this perspective at this time, denies any chest pain, palpitations, no signs/symptoms to suggest ACS. She is on Lisinopril, Metoprolol Succinate and Plavix.  - Continue current treatments as prescribed. Will continue to monitor.  - Continue telemetry monitoring.    Hypertension  - Blood pressure appears stable and acceptable acutely at this time. No indications to warrant acute changes/intervention at this time.  - Continue current medications as prescribed. Trend BP to guide ongoing management decisions.    Type 2 diabetes mellitus with hyperglycemia  - Most recent A1c: 6.70% (11/28/2022).  - Home medication regimen: None.  - Current treatment regimen: Correctional SSI.  - Per review of POC glucose checks, blood glucose does " appear to be controlled within target inpatient range at this time, and does not warrant changes to insulin regimen.  - Continue current treatment as prescribed with SSI.  - Will monitor 24 hour insulin requirements and adjust as needed to achieve target inpatient range of 140-180.  - Diabetic diet    Anemia  - Hemoglobin low on most recent labs, stable from prior. S/P PRBC transfusion on admission. No evidence of overt blood loss.  Hematology/oncology consulted and following, defer management decisions to them.  - Order repeat CBC in AM for reassessment. Continue to monitor, transfuse for hemoglobin <7.    Generalized fatigue and weakness  - Noted on arrival, etiology likely multifactorial in the setting of myeloproliferative disorder, fluid overload, anemia and other chronic medical issues.  - Treatment for contributing etiologies as discussed elsewhere.  - PT/OT, fall precautions.    Dysuria  - Endorsed by patient today (09/29). Order UA. Follow up results to guide management decisions.      SCDs for DVT prophylaxis.  Limited code (no CPR, no intubation).  Discussed with patient, nursing staff, CCP, and care team on multidisciplinary rounds.  Anticipate discharge  assisted living facility  tomorrow.      Reuben Pierce DO  [facility]  09/29/23

## 2023-09-29 NOTE — PLAN OF CARE
Goal Outcome Evaluation:  Plan of Care Reviewed With: patient           Outcome Evaluation: O2 dropped 86-89% on RA. Wound care complete. Cards consult today. SBP stable.

## 2023-09-29 NOTE — PLAN OF CARE
Goal Outcome Evaluation:  Plan of Care Reviewed With: patient           Outcome Evaluation: Upon entering room, pt. supine in bed, awake/alert, and agreeable to work with P.T. this date.  Pt. able to ambulate 50 feet, CGA x 1, with use of Rwx this PM.  Pt. requires Min. assist x 1 for bed mobility and CGA x 1 for sit <-> stand transfers. BLE ther. ex. program x 10 reps completed for general strengthening. Verbal/tactile cues given during ambulation for posture correction.  Will continue to progress functional mobility as tolerated.      Anticipated Discharge Disposition (PT): assisted living    Patient was not wearing a face mask during this therapy encounter. Therapist used appropriate personal protective equipment including eye protection, mask, and gloves.  Mask used was standard procedure mask. Appropriate PPE was worn during the entire therapy session. Hand hygiene was completed before and after therapy session. Patient is not in enhanced droplet precautions.

## 2023-09-29 NOTE — THERAPY TREATMENT NOTE
Patient Name: Catherine Boyer  : 1940    MRN: [id number]                              Today's Date: 2023       Admit Date: 2023    Visit Dx:     ICD-10-CM ICD-9-CM   1. Dyspnea, unspecified type  R06.00 786.09   2. Other fatigue  R53.83 780.79   3. Anemia, unspecified type  D64.9 285.9     Patient Active Problem List   Diagnosis    Acute on chronic diastolic heart failure    CAD (coronary artery disease)    Nonbacterial thrombotic endocarditis    Paroxysmal atrial fibrillation    Myeloproliferative disorder    Microcytic anemia    Type 2 diabetes mellitus with circulatory disorder, without long-term current use of insulin    GERD (gastroesophageal reflux disease)    Hypertension    Personal history of transient ischemic attack (TIA), 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 deficits    Porcelain gallbladder    Breast cancer    Chronic diastolic heart failure    Gastritis    Atherosclerosis of abdominal aorta    APC (atrial premature contractions)    Moderate malnutrition    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    KARLI (acute kidney injury)    Intractable nausea and vomiting    Dyspnea    Elevated troponin    TIA (transient ischemic attack)    PUD (peptic ulcer disease)    Anemia     Past Medical History:   Diagnosis Date    Atherosclerosis of abdominal aorta 2023    Atrial fibrillation     Breast cancer     CAD (coronary artery disease)     NSTEMI 2022: 90% ostial LAD, 99% D1, 70% mid-distal LAD (medical therapy). She received two stents (2.5x18 and 2.5x26mm [place] LEFTY) but I don't know which one went to which lesion.    Carotid atherosclerosis     Cholecystitis 2022    Added automatically from request for surgery [id number]    Chronic diastolic (congestive) heart failure     COVID 10/29/2022    GERD (gastroesophageal reflux disease)     Glaucoma     History of cataract     Hypertension     Microcytic anemia     per Dr. oleg pate office  note 22-dd    Myeloproliferative disorder     JAK2  positive    Nonbacterial thrombotic endocarditis     6/2021: 4x5mm vegetation on the ventricular surface of the anterior MV, negative blood cultures    Porcelain gallbladder     PUD (peptic ulcer disease)     TIA (transient ischemic attack)     Type 2 diabetes mellitus     Type 2 diabetes mellitus with circulatory disorder, without long-term current use of insulin 07/14/2022    Upper GI bleed      Past Surgical History:   Procedure Laterality Date    BREAST LUMPECTOMY  1999    CARDIAC CATHETERIZATION N/A 09/02/2022    Procedure: Coronary angiography;  Surgeon: Guero Verde MD;  Location: [facility] CATH INVASIVE LOCATION;  Service: Cardiovascular;  Laterality: N/A;    CARDIAC CATHETERIZATION N/A 09/02/2022    Procedure: Stent LEFTY coronary;  Surgeon: Guero Verde MD;  Location: [facility] CATH INVASIVE LOCATION;  Service: Cardiovascular;  Laterality: N/A;    CATARACT EXTRACTION  2011    CHOLECYSTECTOMY      CHOLECYSTECTOMY WITH INTRAOPERATIVE CHOLANGIOGRAM N/A 11/28/2022    Procedure: CHOLECYSTECTOMY LAPAROSCOPIC INTRAOPERATIVE CHOLANGIOGRAM;  Surgeon: Dani Grover Jr., MD;  Location: [facility] MAIN OR;  Service: General;  Laterality: N/A;    COLONOSCOPY N/A 02/09/2023    Procedure: COLONOSCOPY TO CECUM & T.I.;  Surgeon: Guero Ferris MD;  Location: [facility] ENDOSCOPY;  Service: Gastroenterology;  Laterality: N/A;  PRE- MELENA, GI BLEED  POST- DIVERTICULOSIS, INT HEMORRHOIDS    ENDOSCOPY N/A 01/25/2023    Procedure: ESOPHAGOGASTRODUODENOSCOPY WITH BIOPSIES;  Surgeon: Charles Diaz MD;  Location: [facility] ENDOSCOPY;  Service: Gastroenterology;  Laterality: N/A;  pre: abd pain, nausea  post: hiatal hernia, mild gastritis, sloughing of the esophagus    ENTEROSCOPY SMALL BOWEL N/A 02/09/2023    Procedure: ENTEROSCOPY SMALL BOWEL;  Surgeon: Guero Ferris MD;  Location: [facility] ENDOSCOPY;  Service: Gastroenterology;  Laterality: N/A;  PRE- MELENA, GI BLEED  POST- HIATAL HERNIA    JOINT REPLACEMENT      UPPER  GASTROINTESTINAL ENDOSCOPY        General Information       [facility] Name 09/29/23 1518          OT Time and Intention    Document Type therapy note (daily note)  -     Mode of Treatment occupational therapy  -[facility] Name 09/29/23 1518          General Information    Patient Profile Reviewed yes  -     Existing Precautions/Restrictions fall;oxygen therapy device and L/min  -       Row Name 09/29/23 1518          Cognition    Orientation Status (Cognition) oriented x 3  -[facility] Name 09/29/23 1518          Safety Issues, Functional Mobility    Safety Issues Affecting Function (Mobility) insight into deficits/self-awareness  -     Impairments Affecting Function (Mobility) endurance/activity tolerance;strength;shortness of breath  -               User Key  (r) = Recorded By, (t) = Taken By, (c) = Cosigned By      Initials Name Provider Type     Esperanza Lozada, AVINASH Occupational Therapist                     Mobility/ADL's       Row Name 09/29/23 1518          Bed Mobility    Bed Mobility supine-sit;sit-supine  -     Supine-Sit [place] (Bed Mobility) standby assist  -     Sit-Supine [place] (Bed Mobility) standby assist  -     Assistive Device (Bed Mobility) head of bed elevated  -[facility] Name 09/29/23 1518          Transfers    Transfers sit-stand transfer  -[facility] Name 09/29/23 1518          Sit-Stand Transfer    Sit-Stand [place] (Transfers) contact guard  -     Assistive Device (Sit-Stand Transfers) walker, front-wheeled  -[facility] Name 09/29/23 1518          Functional Mobility    Functional Mobility- Ind. Level contact guard assist  -     Functional Mobility- Device walker, front-wheeled  -     Functional Mobility- Comment pt performs fxl ambulation to simulate household distances with CGA and use of RW.  -[facility] Name 09/29/23 1518          Grooming Assessment/Training    [place] Level (Grooming) grooming skills;wash face, hands;set up  -      Position (Grooming) sink side;unsupported standing  -       Row Name 09/29/23 1518          Toileting Assessment/Training    Comment, (Toileting) susanne. Encouraged to ambulate to and from bathroom vs BSC instead of pw  -               User Key  (r) = Recorded By, (t) = Taken By, (c) = Cosigned By      Initials Name Provider Type    Esperanza Richardson OT Occupational Therapist                   Obj/Interventions       Row Name 09/29/23 1520          Shoulder (Therapeutic Exercise)    Shoulder (Therapeutic Exercise) strengthening exercise  -     Shoulder Strengthening (Therapeutic Exercise) bilateral;flexion;extension;horizontal aBduction/aDduction;2 lb free weight;10 repetitions;2 sets  -       Row Name 09/29/23 1520          Motor Skills    Therapeutic Exercise shoulder;elbow/forearm  -[facility] Name 09/29/23 1520          Balance    Static Standing Balance standby assist  -     Dynamic Standing Balance contact guard  -     Position/Device Used, Standing Balance supported;walker, front-wheeled  -     Balance Interventions occupation based/functional task;sitting;standing  -               User Key  (r) = Recorded By, (t) = Taken By, (c) = Cosigned By      Initials Name Provider Type     Esperanza Lozada OT Occupational Therapist                   Goals/Plan    No documentation.                  Clinical Impression       Row Name 09/29/23 1521          Pain Assessment    Pretreatment Pain Rating 0/10 - no pain  -     Posttreatment Pain Rating 0/10 - no pain  -[facility] Name 09/29/23 1521          Plan of Care Review    Plan of Care Reviewed With patient  -     Progress improving  -     Outcome Evaluation Pt was found supine in bed, pleasant and agreeable to OOB ax this PM. She sits EOB with SBA and stands with CGA using RW. She performs fxl ambulation around room to simulate household distances, no LOB during task. Slightly SOB but recovers quickly. She completes simple grooming task standing  at the sink with spv, UB therex sitting EOB (~2# weighted basin, 10x2 sets). Pt demo's some improvement in overall function, OT will cont to follow  -JW       Row Name 09/29/23 1521          Positioning and Restraints    Pre-Treatment Position in bed  -JW     Post Treatment Position bed  -JW     In Bed fowlers;call light within reach;encouraged to call for assist;exit alarm on;with family/caregiver  -JW               User Key  (r) = Recorded By, (t) = Taken By, (c) = Cosigned By      Initials Name Provider Type    Esperanza Richardson OT Occupational Therapist                   Outcome Measures    No documentation.                   Occupational Therapy Education       Title: PT OT SLP Therapies (In Progress)       Topic: Occupational Therapy (Done)       Point: ADL training (Done)       Description:   Instruct learner(s) on proper safety adaptation and remediation techniques during self care or transfers.   Instruct in proper use of assistive devices.                  Learning Progress Summary             Patient Acceptance, E, VU by PP at 9/28/2023 4727    Comment: Pt educated in OT role, DC planning, HEP and safe xfer tech to increase safety w/ fxnl xfers and UE strength.                         Point: Home exercise program (Done)       Description:   Instruct learner(s) on appropriate technique for monitoring, assisting and/or progressing therapeutic exercises/activities.                  Learning Progress Summary             Patient Acceptance, E, VU by PP at 9/28/2023 4907    Comment: Pt educated in OT role, DC planning, HEP and safe xfer tech to increase safety w/ fxnl xfers and UE strength.                         Point: Precautions (Done)       Description:   Instruct learner(s) on prescribed precautions during self-care and functional transfers.                  Learning Progress Summary             Patient Acceptance, E, VU by PP at 9/28/2023 7427    Comment: Pt educated in OT role, DC planning, HEP and safe  xfer tech to increase safety w/ fxnl xfers and UE strength.                         Point: Body mechanics (Done)       Description:   Instruct learner(s) on proper positioning and spine alignment during self-care, functional mobility activities and/or exercises.                  Learning Progress Summary             Patient Acceptance, E, VU by PP at 9/28/2023 0442    Comment: Pt educated in OT role, DC planning, HEP and safe xfer tech to increase safety w/ fxnl xfers and UE strength.                                         User Key       Initials Effective Dates Name Provider Type Discipline    PP 06/09/23 -  Stewart Maurer OT Occupational Therapist OT                  OT Recommendation and Plan     Plan of Care Review  Plan of Care Reviewed With: patient  Progress: improving  Outcome Evaluation: Pt was found supine in bed, pleasant and agreeable to OOB ax this PM. She sits EOB with SBA and stands with CGA using RW. She performs fxl ambulation around room to simulate household distances, no LOB during task. Slightly SOB but recovers quickly. She completes simple grooming task standing at the sink with spv, UB therex sitting EOB (~2# weighted basin, 10x2 sets). Pt demo's some improvement in overall function, OT will cont to follow     Time Calculation:         Time Calculation- OT       Row Name 09/29/23 1523             Time Calculation- OT    OT Start Time 1311  -JW      OT Stop Time 1323  -      OT Time Calculation (min) 12 min  -JW      Total Timed Code Minutes- OT 12 minute(s)  -JW      OT Received On 09/29/23  -      OT - Next Appointment 10/02/23  -         Timed Charges    27353 - OT Therapeutic Activity Minutes 12  -JW         Total Minutes    Timed Charges Total Minutes 12  -JW       Total Minutes 12  -JW                User Key  (r) = Recorded By, (t) = Taken By, (c) = Cosigned By      Initials Name Provider Type    Esperanza Richardson OT Occupational Therapist                  Therapy Charges for  Today       Code Description Service Date Service Provider Modifiers Qty    [id number]  OT THERAPEUTIC ACT EA 15 MIN 9/29/2023 Esperanza Lozada OT GO 1                 Esperanza Lozada OT  9/29/2023

## 2023-09-29 NOTE — PROGRESS NOTES
[facility] CBC GROUP INPATIENT PROGRESS NOTE    Length of Stay:  0 days    CHIEF COMPLAINT/REASON FOR VISIT:  JAK2 positive MPD. Anemia     SUBJECTIVE:  Afebrile. Normotensive. Remains on O2. Sleeping comfortably      OBJECTIVE:  Vitals:    09/28/23 2005 09/28/23 2336 09/29/23 0500 09/29/23 0743   BP: 126/61 119/79  128/64   BP Location: Right arm Right arm  Right arm   Patient Position: Lying Lying  Sitting   Pulse: 87 81  80   Resp: 18 18  18   Temp: 99.9 °F (37.7 °C) 98.1 °F (36.7 °C)  97.9 °F (36.6 °C)   TempSrc: Oral Oral  Oral   SpO2: 95% 96%  93%   Weight:   66 kg (145 lb 9.6 oz)    Height:             PHYSICAL EXAMINATION:   Sleeping comfortably    DIAGNOSTIC DATA:  Results Review:     I reviewed the patient's new clinical results.    Results from last 7 days   Lab Units 09/29/23  0341   WBC 10*3/mm3 30.95*   HEMOGLOBIN g/dL 9.2*   HEMATOCRIT % 28.5*   PLATELETS 10*3/mm3 314     Lab Results   Component Value Date    NEUTROABS 22.01 (H) 09/29/2023     Results from last 7 days   Lab Units 09/29/23  0341   SODIUM mmol/L 137   POTASSIUM mmol/L 4.1   CHLORIDE mmol/L 98   CO2 mmol/L 28.5   BUN mg/dL 20   CREATININE mg/dL 0.97   GLUCOSE mg/dL 113*   CALCIUM mg/dL 8.9     Results from last 7 days   Lab Units 09/27/23  1156   INR  1.48*   APTT seconds 47.9*     Results from last 7 days   Lab Units 09/27/23  1156   MAGNESIUM mg/dL 1.8         IMAGING:    None reviewed    ASSESSMENT:  This is a 83 y.o. female with:     *JAK2 positive myeloproliferative disorder, perhaps primary myelofibrosis  She has been seen by Dr. Gerard Foreman with [facility] specialists and cancer and blood disorders in [place].      She was seen there initially on 6/4/2021 with leukocytosis with a white blood cell count of 104,000.  Hemoglobin was normal at 11.6 and platelets were normal at 299,000.  PCR for BCR/ABL and FISH for BCR/ABL were negative.  A bone marrow aspiration was attempted on 6/9/2021 which was unsuccessful.  She then went to  [place] for the summer.  She was admitted to the [facility] between 6/29/2021 and 7/9/2021.  She had a bone marrow aspiration and biopsy performed there on 6/30/2021 showing a hypercellular marrow at greater than 95% with 1% blasts.  Focal 1+ reticulin fibrosis was noted.  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  PCR for BCR/ABL was negative.  JAK2 V617F PCR was positive.  Cytogenetics were normal.  Next generation myeloid disorder profiling of the bone marrow aspirate from 6/30/2021 showed TET2 [id number] and [id number] abnormalities and JAK2 V617F.  Therefore she was suspected to have early primary myelofibrosis and she was started on hydroxyurea 1000 mg daily.  Subsequently, hydroxyurea was held.  Blood counts had improved.  She was admitted to the hospital from 2/9 through 2/15/2022 for symptomatic anemia and chest pain.  Her hemoglobin was 5.5.  The white blood cell count was 7.6.  Platelets were 56,000.  Fecal occult blood testing was negative.  She received 3 units of packed red blood cells.  No endoscopy was performed.  A left heart catheterization was performed with a drug-eluting stent placed to the second diagonal on 2/11/2022 and she was discharged from that hospitalization on aspirin 81 mg, Plavix 75 mg, and Eliquis 5 mg twice daily.  Blood counts improved by 3/7/2022 and her white blood cell count was 7.2 with a hemoglobin 11.4 and platelets 295,000.  She did have a bone marrow aspiration and biopsy on 3/7/2022 showing chronic myeloproliferative neoplasm with potentially post ET fibrosis or consideration of primary myelofibrosis.  There was no evidence for myelodysplasia.  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  She also has atrial fibrillation and is anticoagulated with Eliquis.    Hydroxyurea was discontinued and she was started on ruxolitinib 15 mg twice daily at her visit on  3/14/2022 with Dr. Gerard Foreman.  She had CT imaging of the chest abdomen pelvis on 3/19/2022 that did not demonstrate any splenomegaly.  Calcified mediastinal lymphadenopathy was noted.  Borderline pretracheal and right hilar lymphadenopathy noted.  Small bilateral pleural effusions with bibasilar atelectasis noted.  She has moved from [place] to [place] and is seen initially in the office on 5/15/2022.  She tolerates ruxolitinib well.  White blood cell count has decreased from 5.9 from 19.6.  Platelets have normalized at 170,000, down from 464,000.  The hemoglobin has also decreased at 8.6 with an MCV of 98.9.  5/26/2022: White blood cell count mildly elevated at 12.4 with a normal platelet count at 216,000.  She tolerates Jakafi very well.   6/30/2022: White blood cell count a little higher.  Continue monitoring.  9/13/2022: White blood cell count higher at 32,000  Flow cytometry 9/13/2022 with 0.1% myeloblasts with a typical (normal) phenotype.  Nonspecific monocyte population.  Bone marrow aspiration and biopsy on 10/7/2022 with hypercellular marrow at 95% with involvement by chronic myeloproliferative neoplasm, less than 5% blasts.  Mild reticulin fibrosis.  Decreased iron stores.  Consideration of CMML.  Flow cytometry showed a dim CD56 positive aberrant monocyte population at 8.2 percent of events.  Cytogenetics pending.  NGS pending.  10/26/2022: White blood cell count improved at 21,000.  Patient admitted 10/29/2022 with COVID-19 infection.  Jakafi held.  Resumed Jakafi 15 mg twice daily on 11/1/2022.  WBC stable at 51,000  On 2/17/2023 a CT scan of the abdomen and pelvis showed improved splenomegaly at 14.5 cm, previously 16 cm  4/10/2023: White blood cell count reasonably stable at 66,000  6/26/2023: White blood cell count stable at 42,000  9/19/2023: White blood cell count stable at 52.98  WBC 30.95, from 31.76, from 48.76     *Microcytic and now normocytic anemia  9/18/2023: Hemoglobin 8.0, MCV  101.2  Hgb 9.2, from 8.6, from 8.0 after transfusion     *History of marantic endocarditis and atrial fibrillation  Anticoagulated with Eliquis 5 mg twice daily  This was held due to post-Mohs bleeding, then resumed     *Recent admission with heart failure, positive stress test and LEFTY to a large diagonal branch for in sent stenosis.  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  She does not wish to pursue further evaluation.  She did have some brief epigastric/right upper quadrant pain which has now resolved.  Cholecystectomy by Dr. Dani Grover Jr. on 11/28/2022     *COVID-19 infection  Patient presented to ER on 10/29/2022 with progressive generalized weakness x10 days  Patient tested positive for COVID-19 on admission 10/29/2022  Elevated lactate 3.0  Chest x-ray 10/29/2022 with no evidence of consolidation  Patient was started on dexamethasone and remdesivir  Improved     *GI bleeding February 2023  Presented with abdominal pain, melena.  Hemoglobin 6.1 at presentation.  EGD on 1/25/2023 had noted mild mucosal changes characterized by slowing in the lower third of the esophagus.  Patchy mild inflammation characterized by congestion and erythema found in the gastric body.  The examined duodenum was normal.  Multiple biopsies taken.  Suspected bleeding from prior biopsy site  Anticoagulation with Eliquis and Plavix was held.  On PPI  GI follow-up- EGD/push enteroscopy and colonoscopy when Eliquis held 5 days.    Procedures occurred on 2/9/2023.  Nonbleeding internal hemorrhoids.  No source of bleeding identified.     *Elevated transaminases  Normalized     *Skin lesions  She saw Dr. Fierro at [facility] with biopsies of both locations showing  squamous cell carcinoma  Mohs procedure performed on her leg on 8/4     PLAN:   Continue Jakafi 15 mg twice daily for now.  Continue Plavix 75 mg daily.    Eliquis continues  Diuresing  Transfuse prn to maintain hgb >7 or for symptoms. Hgb improved at 9.2 with diuresis and transfusion.  PT/OT/wound care following  We are planning a transition from Jakafi to momelotinib (Ojjaara) as this was just approved by the FDA for use in people with myelofibrosis and worsening anemia. My office is working to get this as it is a newly available drug. There is no need to taper and she will make the transition when she gets the medication  Daily CBC while admitted  Follow up with me on 10/18 in the office  Will sign off at this time. Please call us back if needed.     Darrell Reinoso MD

## 2023-09-29 NOTE — THERAPY TREATMENT NOTE
Patient Name: Catherine Boyer  : 1940    MRN: [id number]                              Today's Date: 2023       Admit Date: 2023    Visit Dx:     ICD-10-CM ICD-9-CM   1. Dyspnea, unspecified type  R06.00 786.09   2. Other fatigue  R53.83 780.79   3. Anemia, unspecified type  D64.9 285.9     Patient Active Problem List   Diagnosis    Acute on chronic diastolic heart failure    CAD (coronary artery disease)    Nonbacterial thrombotic endocarditis    Paroxysmal atrial fibrillation    Myeloproliferative disorder    Microcytic anemia    Type 2 diabetes mellitus with circulatory disorder, without long-term current use of insulin    GERD (gastroesophageal reflux disease)    Hypertension    Personal history of transient ischemic attack (TIA), 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 deficits    Porcelain gallbladder    Breast cancer    Chronic diastolic heart failure    Gastritis    Atherosclerosis of abdominal aorta    APC (atrial premature contractions)    Moderate malnutrition    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    KARLI (acute kidney injury)    Intractable nausea and vomiting    Dyspnea    Elevated troponin    TIA (transient ischemic attack)    PUD (peptic ulcer disease)    Anemia     Past Medical History:   Diagnosis Date    Atherosclerosis of abdominal aorta 2023    Atrial fibrillation     Breast cancer     CAD (coronary artery disease)     NSTEMI 2022: 90% ostial LAD, 99% D1, 70% mid-distal LAD (medical therapy). She received two stents (2.5x18 and 2.5x26mm [place] LEFTY) but I don't know which one went to which lesion.    Carotid atherosclerosis     Cholecystitis 2022    Added automatically from request for surgery [id number]    Chronic diastolic (congestive) heart failure     COVID 10/29/2022    GERD (gastroesophageal reflux disease)     Glaucoma     History of cataract     Hypertension     Microcytic anemia     per Dr. oleg pate office  note 22-dd    Myeloproliferative disorder     JAK2  positive    Nonbacterial thrombotic endocarditis     6/2021: 4x5mm vegetation on the ventricular surface of the anterior MV, negative blood cultures    Porcelain gallbladder     PUD (peptic ulcer disease)     TIA (transient ischemic attack)     Type 2 diabetes mellitus     Type 2 diabetes mellitus with circulatory disorder, without long-term current use of insulin 07/14/2022    Upper GI bleed      Past Surgical History:   Procedure Laterality Date    BREAST LUMPECTOMY  1999    CARDIAC CATHETERIZATION N/A 09/02/2022    Procedure: Coronary angiography;  Surgeon: Guero Verde MD;  Location: [facility] CATH INVASIVE LOCATION;  Service: Cardiovascular;  Laterality: N/A;    CARDIAC CATHETERIZATION N/A 09/02/2022    Procedure: Stent LEFTY coronary;  Surgeon: Guero Verde MD;  Location: [facility] CATH INVASIVE LOCATION;  Service: Cardiovascular;  Laterality: N/A;    CATARACT EXTRACTION  2011    CHOLECYSTECTOMY      CHOLECYSTECTOMY WITH INTRAOPERATIVE CHOLANGIOGRAM N/A 11/28/2022    Procedure: CHOLECYSTECTOMY LAPAROSCOPIC INTRAOPERATIVE CHOLANGIOGRAM;  Surgeon: Dani Grover Jr., MD;  Location: [facility] MAIN OR;  Service: General;  Laterality: N/A;    COLONOSCOPY N/A 02/09/2023    Procedure: COLONOSCOPY TO CECUM & T.I.;  Surgeon: Guero Ferris MD;  Location: [facility] ENDOSCOPY;  Service: Gastroenterology;  Laterality: N/A;  PRE- MELENA, GI BLEED  POST- DIVERTICULOSIS, INT HEMORRHOIDS    ENDOSCOPY N/A 01/25/2023    Procedure: ESOPHAGOGASTRODUODENOSCOPY WITH BIOPSIES;  Surgeon: Charles Diaz MD;  Location: [facility] ENDOSCOPY;  Service: Gastroenterology;  Laterality: N/A;  pre: abd pain, nausea  post: hiatal hernia, mild gastritis, sloughing of the esophagus    ENTEROSCOPY SMALL BOWEL N/A 02/09/2023    Procedure: ENTEROSCOPY SMALL BOWEL;  Surgeon: Guero Ferris MD;  Location: [facility] ENDOSCOPY;  Service: Gastroenterology;  Laterality: N/A;  PRE- MELENA, GI BLEED  POST- HIATAL HERNIA    JOINT REPLACEMENT      UPPER  GASTROINTESTINAL ENDOSCOPY        General Information       Row Name 09/29/23 1530          Physical Therapy Time and Intention    Document Type therapy note (daily note)  -MS     Mode of Treatment physical therapy;occupational therapy;co-treatment  -MS       Row Name 09/29/23 1530          General Information    Patient Profile Reviewed yes  -MS     Existing Precautions/Restrictions fall;oxygen therapy device and L/min  Exit alarm  -MS     Barriers to Rehab hearing deficit  -MS       Row Name 09/29/23 1530          Cognition    Orientation Status (Cognition) oriented x 3  -MS       Row Name 09/29/23 1530          Safety Issues, Functional Mobility    Comment, Safety Issues/Impairments (Mobility) Gait belt used for safety.  -MS               User Key  (r) = Recorded By, (t) = Taken By, (c) = Cosigned By      Initials Name Provider Type    Kenton Ng, PT Physical Therapist                   Mobility       Row Name 09/29/23 1531          Bed Mobility    Supine-Sit [place] (Bed Mobility) minimum assist (75% patient effort)  -MS     Sit-Supine [place] (Bed Mobility) contact guard  -MS       Row Name 09/29/23 1531          Sit-Stand Transfer    Sit-Stand [place] (Transfers) contact guard  -MS     Assistive Device (Sit-Stand Transfers) walker, front-wheeled  -MS       Row Name 09/29/23 1531          Gait/Stairs (Locomotion)    [place] Level (Gait) contact guard  -MS     Assistive Device (Gait) walker, front-wheeled  -MS     Distance in Feet (Gait) 50 feet  -MS     Deviations/Abnormal Patterns (Gait) jeanna decreased  -MS     Comment, (Gait/Stairs) Verbal/tactile cues given for posture correction.  -MS               User Key  (r) = Recorded By, (t) = Taken By, (c) = Cosigned By      Initials Name Provider Type    Kenton Ng, PT Physical Therapist                   Obj/Interventions       Row Name 09/29/23 1532          Motor Skills    Therapeutic Exercise --  BLE ther. ex. program x  10 reps completed (Ankle pumps, Hip Flexion, LAQ's)  -MS               User Key  (r) = Recorded By, (t) = Taken By, (c) = Cosigned By      Initials Name Provider Type    Kenton Ng PT Physical Therapist                   Goals/Plan    No documentation.                  Clinical Impression       Row Name 09/29/23 1532          Pain    Pretreatment Pain Rating 0/10 - no pain  -MS     Posttreatment Pain Rating 0/10 - no pain  -MS       Row Name 09/29/23 1532          Positioning and Restraints    Pre-Treatment Position in bed  -MS     Post Treatment Position bed  -MS     In Bed notified nsg;supine;call light within reach;encouraged to call for assist;exit alarm on;with family/caregiver  All lines intact.  -MS               User Key  (r) = Recorded By, (t) = Taken By, (c) = Cosigned By      Initials Name Provider Type    Kenton Ng PT Physical Therapist                   Outcome Measures       Row Name 09/29/23 1533          How much help from another person do you currently need...    Turning from your back to your side while in flat bed without using bedrails? 3  -MS     Moving from lying on back to sitting on the side of a flat bed without bedrails? 3  -MS     Moving to and from a bed to a chair (including a wheelchair)? 3  -MS     Standing up from a chair using your arms (e.g., wheelchair, bedside chair)? 3  -MS     Climbing 3-5 steps with a railing? 3  -MS     To walk in hospital room? 3  -MS     AM-PAC 6 Clicks Score (PT) 18  -MS     Highest level of mobility 6 --> Walked 10 steps or more  -MS       Row Name 09/29/23 1533          Functional Assessment    Outcome Measure Options AM-PAC 6 Clicks Basic Mobility (PT)  -MS               User Key  (r) = Recorded By, (t) = Taken By, (c) = Cosigned By      Initials Name Provider Type    Kenton Ng PT Physical Therapist                                 Physical Therapy Education       Title: PT OT SLP Therapies (Done)       Topic: Physical  Therapy (Done)       Point: Mobility training (Done)       Learning Progress Summary             Patient Acceptance, E,D, VU,NR by MS at 9/29/2023 1533    Acceptance, E,D, VU,NR by MS at 9/28/2023 1121                         Point: Home exercise program (Done)       Learning Progress Summary             Patient Acceptance, E,D, VU,NR by MS at 9/29/2023 1533                         Point: Body mechanics (Done)       Learning Progress Summary             Patient Acceptance, E,D, VU,NR by MS at 9/29/2023 1533    Acceptance, E,D, VU,NR by MS at 9/28/2023 1121                         Point: Precautions (Done)       Learning Progress Summary             Patient Acceptance, E,D, VU,NR by MS at 9/29/2023 1533    Acceptance, E,D, VU,NR by MS at 9/28/2023 1121                                         User Key       Initials Effective Dates Name Provider Type Discipline    MS 06/16/21 -  Kenton Song, PT Physical Therapist PT                  PT Recommendation and Plan     Plan of Care Reviewed With: patient  Outcome Evaluation: Upon entering room, pt. supine in bed, awake/alert, and agreeable to work with P.T. this date.  Pt. able to ambulate 50 feet, CGA x 1, with use of Rwx this PM.  Pt. requires Min. assist x 1 for bed mobility and CGA x 1 for sit <-> stand transfers. BLE ther. ex. program x 10 reps completed for general strengthening. Verbal/tactile cues given during ambulation for posture correction.  Will continue to progress functional mobility as tolerated.     Time Calculation:         PT Charges       Row Name 09/29/23 1541             Time Calculation    Start Time 1311  -MS      Stop Time 1323  -MS      Time Calculation (min) 12 min  -MS      PT Received On 09/29/23  -MS      PT - Next Appointment 09/30/23  -MS         Time Calculation- PT    Total Timed Code Minutes- PT 11 minute(s)  -MS                User Key  (r) = Recorded By, (t) = Taken By, (c) = Cosigned By      Initials Name Provider Type    MS  Kenton Song, PT Physical Therapist                  Therapy Charges for Today       Code Description Service Date Service Provider Modifiers Qty    12756187281 HC PT EVAL MOD COMPLEXITY 2 9/28/2023 Kenton Song, PT GP 1    56657910339 HC PT THERAPEUTIC ACT EA 15 MIN 9/28/2023 Kenton Song, PT GP 1    20250217614 HC PT THERAPEUTIC ACT EA 15 MIN 9/29/2023 Kenton Song, PT GP 1            PT G-Codes  Outcome Measure Options: AM-PAC 6 Clicks Basic Mobility (PT)  AM-PAC 6 Clicks Score (PT): 18  AM-PAC 6 Clicks Score (OT): 18  PT Discharge Summary  Anticipated Discharge Disposition (PT): assisted living    Kenton Song, PT  9/29/2023

## 2023-09-30 LAB
ANION GAP SERPL CALCULATED.3IONS-SCNC: 13.4 MMOL/L (ref 5–15)
BACTERIA SPEC AEROBE CULT: NORMAL
BUN SERPL-MCNC: 23 MG/DL (ref 8–23)
BUN/CREAT SERPL: 22.5 (ref 7–25)
CALCIUM SPEC-SCNC: 9.4 MG/DL (ref 8.6–10.5)
CHLORIDE SERPL-SCNC: 93 MMOL/L (ref 98–107)
CO2 SERPL-SCNC: 29.6 MMOL/L (ref 22–29)
CREAT SERPL-MCNC: 1.02 MG/DL (ref 0.57–1)
DEPRECATED RDW RBC AUTO: 59 FL (ref 37–54)
EGFRCR SERPLBLD CKD-EPI 2021: 54 ML/MIN/1.73
EOSINOPHIL # BLD MANUAL: 0.34 10*3/MM3 (ref 0–0.4)
EOSINOPHIL NFR BLD MANUAL: 1 % (ref 0.3–6.2)
ERYTHROCYTE [DISTWIDTH] IN BLOOD BY AUTOMATED COUNT: 17.9 % (ref 12.3–15.4)
GLUCOSE BLDC GLUCOMTR-MCNC: 120 MG/DL (ref 70–130)
GLUCOSE BLDC GLUCOMTR-MCNC: 135 MG/DL (ref 70–130)
GLUCOSE BLDC GLUCOMTR-MCNC: 149 MG/DL (ref 70–130)
GLUCOSE BLDC GLUCOMTR-MCNC: 152 MG/DL (ref 70–130)
GLUCOSE SERPL-MCNC: 124 MG/DL (ref 65–99)
HCT VFR BLD AUTO: 31.5 % (ref 34–46.6)
HGB BLD-MCNC: 10.3 G/DL (ref 12–15.9)
HYPOCHROMIA BLD QL: ABNORMAL
LYMPHOCYTES # BLD MANUAL: 2.05 10*3/MM3 (ref 0.7–3.1)
LYMPHOCYTES NFR BLD MANUAL: 12.1 % (ref 5–12)
MCH RBC QN AUTO: 30.2 PG (ref 26.6–33)
MCHC RBC AUTO-ENTMCNC: 32.7 G/DL (ref 31.5–35.7)
MCV RBC AUTO: 92.4 FL (ref 79–97)
METAMYELOCYTES NFR BLD MANUAL: 3 % (ref 0–0)
MONOCYTES # BLD: 4.07 10*3/MM3 (ref 0.1–0.9)
NEUTROPHILS # BLD AUTO: 26.16 10*3/MM3 (ref 1.7–7)
NEUTROPHILS NFR BLD MANUAL: 77.8 % (ref 42.7–76)
PLAT MORPH BLD: NORMAL
PLATELET # BLD AUTO: 354 10*3/MM3 (ref 140–450)
PMV BLD AUTO: 11.4 FL (ref 6–12)
POLYCHROMASIA BLD QL SMEAR: ABNORMAL
POTASSIUM SERPL-SCNC: 4.4 MMOL/L (ref 3.5–5.2)
RBC # BLD AUTO: 3.41 10*6/MM3 (ref 3.77–5.28)
SODIUM SERPL-SCNC: 136 MMOL/L (ref 136–145)
VARIANT LYMPHS NFR BLD MANUAL: 6.1 % (ref 19.6–45.3)
WBC MORPH BLD: NORMAL
WBC NRBC COR # BLD: 33.63 10*3/MM3 (ref 3.4–10.8)

## 2023-09-30 PROCEDURE — 85007 BL SMEAR W/DIFF WBC COUNT: CPT | Performed by: INTERNAL MEDICINE

## 2023-09-30 PROCEDURE — 25010000002 CEFTRIAXONE PER 250 MG: Performed by: STUDENT IN AN ORGANIZED HEALTH CARE EDUCATION/TRAINING PROGRAM

## 2023-09-30 PROCEDURE — 85025 COMPLETE CBC W/AUTO DIFF WBC: CPT | Performed by: INTERNAL MEDICINE

## 2023-09-30 PROCEDURE — G0378 HOSPITAL OBSERVATION PER HR: HCPCS

## 2023-09-30 PROCEDURE — 80048 BASIC METABOLIC PNL TOTAL CA: CPT | Performed by: INTERNAL MEDICINE

## 2023-09-30 PROCEDURE — 82948 REAGENT STRIP/BLOOD GLUCOSE: CPT

## 2023-09-30 PROCEDURE — 99214 OFFICE O/P EST MOD 30 MIN: CPT | Performed by: INTERNAL MEDICINE

## 2023-09-30 PROCEDURE — 63710000001 INSULIN LISPRO (HUMAN) PER 5 UNITS: Performed by: INTERNAL MEDICINE

## 2023-09-30 RX ORDER — PHENAZOPYRIDINE HYDROCHLORIDE 200 MG/1
200 TABLET, FILM COATED ORAL
Status: DISCONTINUED | OUTPATIENT
Start: 2023-09-30 | End: 2023-09-30 | Stop reason: DRUGHIGH

## 2023-09-30 RX ORDER — PHENAZOPYRIDINE HYDROCHLORIDE 100 MG/1
100 TABLET, FILM COATED ORAL ONCE
Status: COMPLETED | OUTPATIENT
Start: 2023-09-30 | End: 2023-09-30

## 2023-09-30 RX ORDER — FUROSEMIDE 40 MG/1
40 TABLET ORAL DAILY
Status: DISCONTINUED | OUTPATIENT
Start: 2023-10-01 | End: 2023-10-01

## 2023-09-30 RX ORDER — HYDROXYZINE HYDROCHLORIDE 10 MG/1
10 TABLET, FILM COATED ORAL 3 TIMES DAILY PRN
Status: DISCONTINUED | OUTPATIENT
Start: 2023-09-30 | End: 2023-10-02 | Stop reason: HOSPADM

## 2023-09-30 RX ORDER — PHENAZOPYRIDINE HYDROCHLORIDE 100 MG/1
100 TABLET, FILM COATED ORAL 2 TIMES DAILY PRN
Status: DISCONTINUED | OUTPATIENT
Start: 2023-09-30 | End: 2023-09-30

## 2023-09-30 RX ORDER — CEFDINIR 300 MG/1
300 CAPSULE ORAL 2 TIMES DAILY
Qty: 6 CAPSULE | Refills: 0 | OUTPATIENT
Start: 2023-09-30 | End: 2023-10-03

## 2023-09-30 RX ADMIN — FAMOTIDINE 20 MG: 20 TABLET, FILM COATED ORAL at 08:59

## 2023-09-30 RX ADMIN — INSULIN LISPRO 2 UNITS: 100 INJECTION, SOLUTION INTRAVENOUS; SUBCUTANEOUS at 11:33

## 2023-09-30 RX ADMIN — Medication 250 MG: at 20:03

## 2023-09-30 RX ADMIN — CLOPIDOGREL BISULFATE 75 MG: 75 TABLET, FILM COATED ORAL at 08:59

## 2023-09-30 RX ADMIN — LISINOPRIL 2.5 MG: 2.5 TABLET ORAL at 08:59

## 2023-09-30 RX ADMIN — FUROSEMIDE 40 MG: 40 TABLET ORAL at 08:59

## 2023-09-30 RX ADMIN — Medication 10 ML: at 11:34

## 2023-09-30 RX ADMIN — LATANOPROST 1 DROP: 50 SOLUTION OPHTHALMIC at 21:10

## 2023-09-30 RX ADMIN — SENNOSIDES AND DOCUSATE SODIUM 2 TABLET: 50; 8.6 TABLET ORAL at 20:03

## 2023-09-30 RX ADMIN — APIXABAN 5 MG: 5 TABLET, FILM COATED ORAL at 09:00

## 2023-09-30 RX ADMIN — HYDROXYZINE HYDROCHLORIDE 10 MG: 10 TABLET ORAL at 21:25

## 2023-09-30 RX ADMIN — SENNOSIDES AND DOCUSATE SODIUM 2 TABLET: 50; 8.6 TABLET ORAL at 09:00

## 2023-09-30 RX ADMIN — APIXABAN 5 MG: 5 TABLET, FILM COATED ORAL at 20:03

## 2023-09-30 RX ADMIN — Medication 250 MG: at 08:59

## 2023-09-30 RX ADMIN — MUPIROCIN 1 APPLICATION: 20 OINTMENT TOPICAL at 20:03

## 2023-09-30 RX ADMIN — CEFTRIAXONE SODIUM 1000 MG: 1 INJECTION, POWDER, FOR SOLUTION INTRAMUSCULAR; INTRAVENOUS at 09:00

## 2023-09-30 RX ADMIN — Medication 10 ML: at 20:03

## 2023-09-30 RX ADMIN — MUPIROCIN 1 APPLICATION: 20 OINTMENT TOPICAL at 09:01

## 2023-09-30 RX ADMIN — PHENAZOPYRIDINE HYDROCHLORIDE 100 MG: 100 TABLET ORAL at 18:09

## 2023-09-30 RX ADMIN — SERTRALINE 100 MG: 100 TABLET, FILM COATED ORAL at 08:59

## 2023-09-30 NOTE — PLAN OF CARE
Goal Outcome Evaluation:         VSS. No SOB. On 1.5 L O2. Sinus Tach. Restless at times. Rocephin for UTI. Pyridium order for dysuria. Care plan carefully updated.  Problem: Adult Inpatient Plan of Care  Goal: Absence of Hospital-Acquired Illness or Injury  Intervention: Identify and Manage Fall Risk  Recent Flowsheet Documentation  Taken 9/30/2023 1620 by Nazario Rivas, KARRI  Safety Promotion/Fall Prevention:   safety round/check completed   room organization consistent  Taken 9/30/2023 1425 by Nazario Rivas RN  Safety Promotion/Fall Prevention:   safety round/check completed   room organization consistent  Taken 9/30/2023 1215 by Nazario Rivas RN  Safety Promotion/Fall Prevention:   safety round/check completed   room organization consistent  Taken 9/30/2023 1020 by Nazario Rivas RN  Safety Promotion/Fall Prevention:   safety round/check completed   room organization consistent  Taken 9/30/2023 0810 by Nazario Rivas RN  Safety Promotion/Fall Prevention:   safety round/check completed   room organization consistent

## 2023-09-30 NOTE — PROGRESS NOTES
Name: Catherine Boyer ADMIT: 2023   : 1940  PCP: Kristi Moreau APRN    MRN: [id number] LOS: 0 days   AGE/SEX: 83 y.o. female  ROOM: [facility]     Subjective   Subjective   Patient seen and examined this morning.  Hospital day 3.  At time of my examination, she is resting comfortably in bed, she states that she feels a little more tired today but her breathing has continued to improve from prior.      Objective   Objective   Vital Signs  Temp:  [98.1 °F (36.7 °C)-99 °F (37.2 °C)] 98.1 °F (36.7 °C)  Heart Rate:  [] 104  Resp:  [17-18] 18  BP: ()/(48-71) 94/48  SpO2:  [92 %-100 %] 100 %  on  Flow (L/min):  [0.5-2] 2;   Device (Oxygen Therapy): nasal cannula  Body mass index is 24.7 kg/m².  Physical Exam  Vitals and nursing note reviewed.   Constitutional:       General: She is not in acute distress.     Comments: Elderly, chronically ill-appearing   Eyes:      General: No scleral icterus.     Conjunctiva/sclera: Conjunctivae normal.   Cardiovascular:      Rate and Rhythm: Normal rate and regular rhythm.      Pulses: Normal pulses.      Heart sounds: Normal heart sounds.   Pulmonary:      Effort: Pulmonary effort is normal. No respiratory distress.      Breath sounds: No wheezing or rhonchi.   Abdominal:      General: Bowel sounds are normal. There is no distension.      Palpations: Abdomen is soft.      Tenderness: There is no abdominal tenderness.   Musculoskeletal:      Right lower leg: No edema.      Left lower leg: No edema.      Comments: Trace lower extremity edema   Skin:     General: Skin is warm and dry.      Coloration: Skin is pale.      Findings: Bruising (Scattered) present.   Neurological:      Mental Status: She is alert.     Results Review     I reviewed the patient's new clinical results.  Results from last 7 days   Lab Units 23  0411 23  0341 23  0337 23  1156   WBC 10*3/mm3 33.63* 30.95* 31.76* 48.76*   HEMOGLOBIN g/dL 10.3* 9.2* 8.6* 8.0*   PLATELETS  10*3/mm3 354 314 321 412       Results from last 7 days   Lab Units 09/30/23  0411 09/29/23 0341 09/28/23  0337 09/27/23  1156   SODIUM mmol/L 136 137 139 141   POTASSIUM mmol/L 4.4 4.1 4.3 4.4   CHLORIDE mmol/L 93* 98 100 98   CO2 mmol/L 29.6* 28.5 28.0 28.2   BUN mg/dL 23 20 15 12   CREATININE mg/dL 1.02* 0.97 0.87 0.77   GLUCOSE mg/dL 124* 113* 101* 100*   EGFR mL/min/1.73 54.0* 57.4* 65.4 75.7       Results from last 7 days   Lab Units 09/27/23  1156   ALBUMIN g/dL 4.2   BILIRUBIN mg/dL 0.6   ALK PHOS U/L 137*   AST (SGOT) U/L 20   ALT (SGPT) U/L 15       Results from last 7 days   Lab Units 09/30/23 0411 09/29/23 0341 09/28/23 0337 09/27/23  1156   CALCIUM mg/dL 9.4 8.9 9.0 9.0   ALBUMIN g/dL  --   --   --  4.2   MAGNESIUM mg/dL  --   --   --  1.8   PHOSPHORUS mg/dL  --   --   --  2.5         Hemoglobin A1C   Date/Time Value Ref Range Status   09/29/2023 1006 6.60 (H) 4.80 - 5.60 % Final     Glucose   Date/Time Value Ref Range Status   09/30/2023 1106 152 (H) 70 - 130 mg/dL Final   09/30/2023 0636 135 (H) 70 - 130 mg/dL Final   09/29/2023 2039 128 70 - 130 mg/dL Final   09/29/2023 1657 109 70 - 130 mg/dL Final   09/29/2023 1205 126 70 - 130 mg/dL Final   09/29/2023 0809 118 70 - 130 mg/dL Final   09/29/2023 0625 108 70 - 130 mg/dL Final       No radiology results for the last day    I have personally reviewed all medications:  Scheduled Medications  apixaban, 5 mg, Oral, BID  cefTRIAXone, 1,000 mg, Intravenous, Q24H  clopidogrel, 75 mg, Oral, Daily  famotidine, 20 mg, Oral, Daily  furosemide, 40 mg, Oral, BID  hydrocortisone-bacitracin-zinc oxide-nystatin, 1 application , Topical, BID  insulin lispro, 2-7 Units, Subcutaneous, 4x Daily AC & at Bedtime  latanoprost, 1 drop, Both Eyes, Nightly  lisinopril, 2.5 mg, Oral, Daily  mupirocin, 1 application , Topical, Q12H  ruxolitinib, 15 mg, Oral, BID  saccharomyces boulardii, 250 mg, Oral, BID  senna-docusate sodium, 2 tablet, Oral, BID  sertraline, 100 mg, Oral,  Daily  sodium chloride, 10 mL, Intravenous, Q12H    Infusions   Diet  Diet: Cardiac Diets, Diabetic Diets; Healthy Heart (2-3 Na+); Consistent Carbohydrate; Texture: Regular Texture (IDDSI 7); Fluid Consistency: Thin (IDDSI 0)    I have personally reviewed:  [x]  Laboratory   [x]  Microbiology   [x]  Radiology   [x]  EKG/Telemetry  [x]  Cardiology/Vascular   []  Pathology    []  Records       Assessment/Plan     Active Hospital Problems    Diagnosis  POA    **Dyspnea [R06.00]  Yes    Anemia [D64.9]  Yes    Elevated troponin [R77.8]  Yes    Hypertension [I10]  Yes    GERD (gastroesophageal reflux disease) [K21.9]  Yes    Type 2 diabetes mellitus with circulatory disorder, without long-term current use of insulin [E11.59]  Yes    Paroxysmal atrial fibrillation [I48.0]  Yes    CAD (coronary artery disease) [I25.10]  Yes    Myeloproliferative disorder [D47.1]  Yes    Acute on chronic diastolic heart failure [I50.33]  Yes      Resolved Hospital Problems   No resolved problems to display.       83 y.o. female admitted with Dyspnea.    Acute on chronic HFpEF  Pulmonary hypertension  Elevated troponin  - Most recent 2D Echo prior to admission: (05/06/2023) showing EF 51 to 55%, low normal systolic function, mild septal asymmetric hypertrophy, moderately dilated left atrial cavity, mild aortic stenosis, elevated RVSP at 62 mmHg consistent with severe pulmonary hypertension, mild to moderate mitral regurgitation.  Current outpatient home diuretic: Furosemide 40 mg PO daily. Etiology of acute exacerbation likely 2/2 to increased sodium intake, leading to fluid retention.  - Imaging obtained on admission, reviewed, negative for acute cardiopulmonary abnormalities. ProBNP elevated at 6249.  Troponin slightly elevated but stable on repeat testing, likely a result of heart failure.  EKG showing sinus rhythm and premature atrial complexes, unchanged from prior tracing.  - Following arrival she was given x1 dose of IV Lasix and  "subsequently had her home dose of Furosemide increased to 40 mg PO BID, which she remains on at present.  Respiratory status appears stable at present and overall much improved from admission, per patient.   - Cardiology consulted and following. [place] that patient stable at present. Will continue to follow their plans/recommendations. Greatly appreciate their help.  - Plan to continue Furosemide 40 mg BID as currently prescribed.    JAK2 positive myeloproliferative disorder  Chronic Leukocytosis  - Hematology/Oncology consulted and followed. Per their most recent note: \"Continue Jakafi 15 mg twice daily. We are planning a transition from Jakafi to momelotinib (Ojjaara) as this was just approved by the FDA for use in people with myelofibrosis and worsening anemia. My office is working to get this as it is a newly available drug.\"  - Most recent note reviewed, they have now signed off. Plan for outpatient follow up, appreciate their help.  - WBC elevated, chronic finding, relatively stable at this time, no indications to warrant acute intervention, however, do need to continue close monitoring. Order repeat CBC in AM for reassessment.    Dysuria  Urinary tract infection  - Patient endorsing symptoms of dysuria on 09/29, further workup obtained. UA findings showing 3+ leukocyte esterase, TNTC WBC.  - Start empiric antibiotic therapy with ceftriaxone, while awaiting results of further workup.  - Follow up blood and urine cultures.  - Acute urinary retention protocol.    Worsening creatinine  - Noted on most recent labs, slightly worse from prior. Likely a result of diuresis coupled with decreased oral intake.  - Will talk with cardiology regarding further diuretic plans.   - Continue to closely monitor. Order repeat BMP in AM for reassessment.    Paroxysmal Atrial fibrillation on long term anticoagulation  - [id number] score: 9.  - Vitals, telemetry reviewed, patient appears to be stable, rate controlled on current " treatment regimen with Metoprolol Succinate. On Eliquis for AC.  - Continue current treatment as prescribed. Continue telemetry monitoring.    Coronary artery disease  - Patient appears stable from this perspective at this time, denies any chest pain, palpitations, no signs/symptoms to suggest ACS. She is on Lisinopril, Metoprolol Succinate and Plavix.  - Continue current treatments as prescribed. Will continue to monitor.  - Continue telemetry monitoring.    Hypertension  - Blood pressure appears stable and acceptable acutely at this time. No indications to warrant acute changes/intervention at this time.  - Continue current medications as prescribed. Trend BP to guide ongoing management decisions.    Type 2 diabetes mellitus with hyperglycemia  - Most recent A1c: 6.70% (11/28/2022).  Repeat hemoglobin A1c 6.60% (09/29/2023).  - Home medication regimen: None.  - Current treatment regimen: Correctional SSI.  - Per review of POC glucose checks, blood glucose does appear to be controlled within target inpatient range at this time, and does not warrant changes to insulin regimen.  - Continue current treatment as prescribed with SSI.  - Will monitor 24 hour insulin requirements and adjust as needed to achieve target inpatient range of 140-180.  - Diabetic diet    Anemia  - Hemoglobin low on most recent labs, stable from prior. S/P PRBC transfusion on admission. No evidence of overt blood loss.  Hematology/oncology consulted and followed, but has since signed off.  No indications to warrant further acute intervention at this time.  - Order repeat CBC in AM for reassessment. Continue to monitor, transfuse for hemoglobin <7.    Generalized fatigue and weakness  - Noted on arrival, etiology likely multifactorial in the setting of myeloproliferative disorder, fluid overload, anemia and other chronic medical issues.  - Treatment for contributing etiologies as discussed elsewhere.  - Plan is for patient to return to assisted  living facility at discharge.  - PT/OT, fall precautions.      SCDs for DVT prophylaxis.  Limited code (no CPR, no intubation).  Discussed with patient and nursing staff.  Anticipate discharge  assisted living facility   tomorrow if urine culture results and cleared by consultants      DO [facility]  09/30/23

## 2023-09-30 NOTE — PROGRESS NOTES
Hospital Follow Up    LOS: 0  Patient Name: Catherine Boyer  Age/Sex: 83 y.o. female  : 1940  MRN: [id number]    Day of Service: 23   Length of Stay: 0  Encounter Provider: Freeman Doss MD  Place of Service: [facility] CARDIOLOGY  Patient Care Team:  Kristi Moreau APRN as PCP - General (Nurse Practitioner)  Gerard Foreman MD as Referring Physician (Medical Oncology)  Darrell Reinoso MD as Consulting Physician (Hematology and Oncology)  Albin Barriga MD as Cardiologist (Cardiology)  Richard Aldana RN as     Subjective:     Chief Complaint: Dyspnea    Interval History: Patient says she feels significantly better she is just kind of tired.  Breathing is back to baseline no chest pains.    Objective:     Objective:  Temp:  [98.1 °F (36.7 °C)-99 °F (37.2 °C)] 98.1 °F (36.7 °C)  Heart Rate:  [] 98  Resp:  [17-18] 18  BP: ()/(49-71) 124/71     Intake/Output Summary (Last 24 hours) at 2023 1024  Last data filed at 2023 0810  Gross per 24 hour   Intake 140 ml   Output --   Net 140 ml     Body mass index is 24.7 kg/m².      23  0445 23  0500 23  0429   Weight: 66.1 kg (145 lb 11.6 oz) 66 kg (145 lb 9.6 oz) 65.3 kg (143 lb 15.4 oz)     Weight change: -0.744 kg (-1 lb 10.2 oz)      Physical Exam:   General :  Alert, cooperative, in no acute distress.  Neuro:   Alert,cooperative and oriented.  Lungs:  CTAB. Normal respiratory effort and rate.  CV:  Regular rate and rhythm, normal S1 and S2, no murmurs, gallops or rubs.   ABD:  Soft, nontender, nondistended. Positive bowel sounds.  Extr:  No edema or cyanosis, moves all extremities.    Lab Review:   Results from last 7 days   Lab Units 23  0411 23  0341 23  0337 23  1156   SODIUM mmol/L 136 137   < > 141   POTASSIUM mmol/L 4.4 4.1   < > 4.4   CHLORIDE mmol/L 93* 98   < > 98   CO2 mmol/L 29.6* 28.5   < > 28.2   BUN mg/dL 23 20   < > 12   CREATININE  mg/dL 1.02* 0.97   < > 0.77   GLUCOSE mg/dL 124* 113*   < > 100*   CALCIUM mg/dL 9.4 8.9   < > 9.0   AST (SGOT) U/L  --   --   --  20   ALT (SGPT) U/L  --   --   --  15    < > = values in this interval not displayed.     Results from last 7 days   Lab Units 09/27/23  1800 09/27/23  1554 09/27/23  1156   HSTROP T ng/L 47* 43* 46*     Results from last 7 days   Lab Units 09/30/23  0411 09/29/23  0341   WBC 10*3/mm3 33.63* 30.95*   HEMOGLOBIN g/dL 10.3* 9.2*   HEMATOCRIT % 31.5* 28.5*   PLATELETS 10*3/mm3 354 314     Results from last 7 days   Lab Units 09/27/23  1156   INR  1.48*   APTT seconds 47.9*     Results from last 7 days   Lab Units 09/27/23  1156   MAGNESIUM mg/dL 1.8           Invalid input(s): LDLCALC  Results from last 7 days   Lab Units 09/28/23  0337 09/27/23  1156   PROBNP pg/mL 5,511.0* 6,249.0*           Current Medications:   Scheduled Meds:apixaban, 5 mg, Oral, BID  cefTRIAXone, 1,000 mg, Intravenous, Q24H  clopidogrel, 75 mg, Oral, Daily  famotidine, 20 mg, Oral, Daily  furosemide, 40 mg, Oral, BID  hydrocortisone-bacitracin-zinc oxide-nystatin, 1 application , Topical, BID  insulin lispro, 2-7 Units, Subcutaneous, 4x Daily AC & at Bedtime  latanoprost, 1 drop, Both Eyes, Nightly  lisinopril, 2.5 mg, Oral, Daily  mupirocin, 1 application , Topical, Q12H  ruxolitinib, 15 mg, Oral, BID  saccharomyces boulardii, 250 mg, Oral, BID  senna-docusate sodium, 2 tablet, Oral, BID  sertraline, 100 mg, Oral, Daily  sodium chloride, 10 mL, Intravenous, Q12H      Continuous Infusions:     Allergies:  Allergies   Allergen Reactions    Aspirin Unknown - Low Severity    Oxycodone Unknown - Low Severity    Hydroxyurea Other (See Comments)     Her hemoglobin drop and was stop in February 2022 and start taking Jakafi    Diphenhydramine Hcl Anxiety and Unknown (See Comments)     Benadryl IVP       Assessment:       Dyspnea    Acute on chronic diastolic heart failure    CAD (coronary artery disease)    Paroxysmal atrial  fibrillation    Myeloproliferative disorder    Type 2 diabetes mellitus with circulatory disorder, without long-term current use of insulin    GERD (gastroesophageal reflux disease)    Hypertension    Elevated troponin    Anemia        Plan:       Acute on chronic diastolic heart failure due to dietary indiscretion possible high output due to anemia - status post one dose of IV diuretics on admission. Now on oral and appears stable.  JAK2 positive myeloproliferative disorder   Anemia status post transfusion  Coronary artery disease with previous stent to LAD and diagonal branch then in stent stenosis of the diagonal branch a few months later . 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 On clopidogrel.  Paroxysmal atrial fibrillation - on apixaban.   History of marantic endocarditis of the mitral valve  History of GI bleeding - if she has recurrent bleeding, may have to stop apixaban completely.   Severe pulmonary hypertension  Patient appears to be back to baseline from a cardiovascular standpoint.  Patient has been on Plavix as well as Eliquis.  Although it would be optimal to continue both if she continues to rebleed 1 will need to be discontinued.  At this point I think discontinuing her Plavix will be her best option.       Freeman Doss MD  09/30/23  10:24 EDT

## 2023-10-01 LAB
ANION GAP SERPL CALCULATED.3IONS-SCNC: 15.3 MMOL/L (ref 5–15)
ANION GAP SERPL CALCULATED.3IONS-SCNC: 17 MMOL/L (ref 5–15)
ANISOCYTOSIS BLD QL: ABNORMAL
BASOPHILS # BLD MANUAL: 0.95 10*3/MM3 (ref 0–0.2)
BASOPHILS NFR BLD MANUAL: 3 % (ref 0–1.5)
BUN SERPL-MCNC: 36 MG/DL (ref 8–23)
BUN SERPL-MCNC: 37 MG/DL (ref 8–23)
BUN/CREAT SERPL: 27.3 (ref 7–25)
BUN/CREAT SERPL: 29.8 (ref 7–25)
CALCIUM SPEC-SCNC: 9.2 MG/DL (ref 8.6–10.5)
CALCIUM SPEC-SCNC: 9.8 MG/DL (ref 8.6–10.5)
CHLORIDE SERPL-SCNC: 95 MMOL/L (ref 98–107)
CHLORIDE SERPL-SCNC: 97 MMOL/L (ref 98–107)
CO2 SERPL-SCNC: 24.7 MMOL/L (ref 22–29)
CO2 SERPL-SCNC: 26 MMOL/L (ref 22–29)
CREAT SERPL-MCNC: 1.24 MG/DL (ref 0.57–1)
CREAT SERPL-MCNC: 1.32 MG/DL (ref 0.57–1)
DEPRECATED RDW RBC AUTO: 60.3 FL (ref 37–54)
EGFRCR SERPLBLD CKD-EPI 2021: 39.6 ML/MIN/1.73
EGFRCR SERPLBLD CKD-EPI 2021: 42.7 ML/MIN/1.73
EOSINOPHIL # BLD MANUAL: 0.64 10*3/MM3 (ref 0–0.4)
EOSINOPHIL NFR BLD MANUAL: 2 % (ref 0.3–6.2)
ERYTHROCYTE [DISTWIDTH] IN BLOOD BY AUTOMATED COUNT: 18 % (ref 12.3–15.4)
GLUCOSE BLDC GLUCOMTR-MCNC: 102 MG/DL (ref 70–130)
GLUCOSE BLDC GLUCOMTR-MCNC: 121 MG/DL (ref 70–130)
GLUCOSE BLDC GLUCOMTR-MCNC: 128 MG/DL (ref 70–130)
GLUCOSE BLDC GLUCOMTR-MCNC: 132 MG/DL (ref 70–130)
GLUCOSE SERPL-MCNC: 108 MG/DL (ref 65–99)
GLUCOSE SERPL-MCNC: 145 MG/DL (ref 65–99)
HCT VFR BLD AUTO: 30.1 % (ref 34–46.6)
HGB BLD-MCNC: 9.9 G/DL (ref 12–15.9)
LYMPHOCYTES # BLD MANUAL: 3.5 10*3/MM3 (ref 0.7–3.1)
LYMPHOCYTES NFR BLD MANUAL: 8 % (ref 5–12)
MACROCYTES BLD QL SMEAR: ABNORMAL
MCH RBC QN AUTO: 30.7 PG (ref 26.6–33)
MCHC RBC AUTO-ENTMCNC: 32.9 G/DL (ref 31.5–35.7)
MCV RBC AUTO: 93.2 FL (ref 79–97)
METAMYELOCYTES NFR BLD MANUAL: 1 % (ref 0–0)
MONOCYTES # BLD: 2.54 10*3/MM3 (ref 0.1–0.9)
NEUTROPHILS # BLD AUTO: 23.84 10*3/MM3 (ref 1.7–7)
NEUTROPHILS NFR BLD MANUAL: 75 % (ref 42.7–76)
PLAT MORPH BLD: NORMAL
PLATELET # BLD AUTO: 319 10*3/MM3 (ref 140–450)
PMV BLD AUTO: 11.5 FL (ref 6–12)
POTASSIUM SERPL-SCNC: 4.4 MMOL/L (ref 3.5–5.2)
POTASSIUM SERPL-SCNC: 5 MMOL/L (ref 3.5–5.2)
RBC # BLD AUTO: 3.23 10*6/MM3 (ref 3.77–5.28)
SODIUM SERPL-SCNC: 137 MMOL/L (ref 136–145)
SODIUM SERPL-SCNC: 138 MMOL/L (ref 136–145)
VARIANT LYMPHS NFR BLD MANUAL: 11 % (ref 19.6–45.3)
WBC MORPH BLD: NORMAL
WBC NRBC COR # BLD: 31.79 10*3/MM3 (ref 3.4–10.8)

## 2023-10-01 PROCEDURE — 25010000002 CEFTRIAXONE PER 250 MG: Performed by: STUDENT IN AN ORGANIZED HEALTH CARE EDUCATION/TRAINING PROGRAM

## 2023-10-01 PROCEDURE — 80048 BASIC METABOLIC PNL TOTAL CA: CPT | Performed by: STUDENT IN AN ORGANIZED HEALTH CARE EDUCATION/TRAINING PROGRAM

## 2023-10-01 PROCEDURE — 97530 THERAPEUTIC ACTIVITIES: CPT

## 2023-10-01 PROCEDURE — 82948 REAGENT STRIP/BLOOD GLUCOSE: CPT

## 2023-10-01 PROCEDURE — G0378 HOSPITAL OBSERVATION PER HR: HCPCS

## 2023-10-01 PROCEDURE — 85007 BL SMEAR W/DIFF WBC COUNT: CPT | Performed by: STUDENT IN AN ORGANIZED HEALTH CARE EDUCATION/TRAINING PROGRAM

## 2023-10-01 PROCEDURE — 99214 OFFICE O/P EST MOD 30 MIN: CPT | Performed by: NURSE PRACTITIONER

## 2023-10-01 PROCEDURE — 85025 COMPLETE CBC W/AUTO DIFF WBC: CPT | Performed by: STUDENT IN AN ORGANIZED HEALTH CARE EDUCATION/TRAINING PROGRAM

## 2023-10-01 RX ORDER — METOPROLOL SUCCINATE 25 MG/1
12.5 TABLET, EXTENDED RELEASE ORAL
Status: DISCONTINUED | OUTPATIENT
Start: 2023-10-01 | End: 2023-10-02 | Stop reason: HOSPADM

## 2023-10-01 RX ADMIN — Medication 10 ML: at 20:48

## 2023-10-01 RX ADMIN — Medication 10 ML: at 09:04

## 2023-10-01 RX ADMIN — APIXABAN 5 MG: 5 TABLET, FILM COATED ORAL at 20:47

## 2023-10-01 RX ADMIN — MUPIROCIN 1 APPLICATION: 20 OINTMENT TOPICAL at 20:48

## 2023-10-01 RX ADMIN — SENNOSIDES AND DOCUSATE SODIUM 2 TABLET: 50; 8.6 TABLET ORAL at 09:03

## 2023-10-01 RX ADMIN — SENNOSIDES AND DOCUSATE SODIUM 2 TABLET: 50; 8.6 TABLET ORAL at 20:47

## 2023-10-01 RX ADMIN — FAMOTIDINE 20 MG: 20 TABLET, FILM COATED ORAL at 09:03

## 2023-10-01 RX ADMIN — MUPIROCIN 1 APPLICATION: 20 OINTMENT TOPICAL at 09:03

## 2023-10-01 RX ADMIN — APIXABAN 5 MG: 5 TABLET, FILM COATED ORAL at 09:03

## 2023-10-01 RX ADMIN — LATANOPROST 1 DROP: 50 SOLUTION OPHTHALMIC at 20:51

## 2023-10-01 RX ADMIN — LISINOPRIL 2.5 MG: 2.5 TABLET ORAL at 09:03

## 2023-10-01 RX ADMIN — SERTRALINE 100 MG: 100 TABLET, FILM COATED ORAL at 09:03

## 2023-10-01 RX ADMIN — SODIUM CHLORIDE, POTASSIUM CHLORIDE, SODIUM LACTATE AND CALCIUM CHLORIDE 250 ML: 600; 310; 30; 20 INJECTION, SOLUTION INTRAVENOUS at 09:03

## 2023-10-01 RX ADMIN — METOPROLOL SUCCINATE 12.5 MG: 25 TABLET, EXTENDED RELEASE ORAL at 15:33

## 2023-10-01 RX ADMIN — CLOPIDOGREL BISULFATE 75 MG: 75 TABLET, FILM COATED ORAL at 09:03

## 2023-10-01 RX ADMIN — Medication 250 MG: at 09:03

## 2023-10-01 RX ADMIN — Medication 250 MG: at 20:47

## 2023-10-01 RX ADMIN — CEFTRIAXONE SODIUM 1000 MG: 1 INJECTION, POWDER, FOR SOLUTION INTRAMUSCULAR; INTRAVENOUS at 09:04

## 2023-10-01 NOTE — PROGRESS NOTES
"CC: Follow-up diastolic ingestive heart failure, coronary artery disease, paroxysmal atrial fibrillation and pulmonary hypertension    Interval History: She has had no chest pain or shortness of breath.  Sitting in chair with family at bedside.      Vital Signs  Temp:  [98.2 °F (36.8 °C)-98.6 °F (37 °C)] 98.2 °F (36.8 °C)  Heart Rate:  [103-112] 112  Resp:  [18] 18  BP: (104-115)/(48-73) 104/48    Intake/Output Summary (Last 24 hours) at 10/1/2023 1515  Last data filed at 10/1/2023 0601  Gross per 24 hour   Intake --   Output 1125 ml   Net -1125 ml     Flowsheet Rows      Flowsheet Row First Filed Value   Admission Height 162.6 cm (64.02\") Documented at 09/27/2023 1219   Admission Weight 68.6 kg (151 lb 3.2 oz) Documented at 09/27/2023 1219            PHYSICAL EXAM:  General: No acute distress  Resp:NL Rate, unlabored, clear  CV:NL rate and tachycardic rhythm, NL PMI, Nl S1 and S2, + Murmur, no gallop, no rub, No JVD. Normal pedal pulses  ABD:Nl sounds, no masses or tenderness, nondistended, no guarding or rebound  Neuro: alert,cooperative and oriented  Extr: No edema or cyanosis, moves all extremities      Results Review:    Results from last 7 days   Lab Units 10/01/23  0448   SODIUM mmol/L 137   POTASSIUM mmol/L 4.4   CHLORIDE mmol/L 97*   CO2 mmol/L 24.7   BUN mg/dL 36*   CREATININE mg/dL 1.32*   GLUCOSE mg/dL 108*   CALCIUM mg/dL 9.2     Results from last 7 days   Lab Units 09/27/23  1800 09/27/23  1554 09/27/23  1156   HSTROP T ng/L 47* 43* 46*     Results from last 7 days   Lab Units 10/01/23  0448   WBC 10*3/mm3 31.79*   HEMOGLOBIN g/dL 9.9*   HEMATOCRIT % 30.1*   PLATELETS 10*3/mm3 319     Results from last 7 days   Lab Units 09/27/23  1156   INR  1.48*   APTT seconds 47.9*         Results from last 7 days   Lab Units 09/27/23  1156   MAGNESIUM mg/dL 1.8         I reviewed the patient's new clinical results.  I personally viewed and interpreted the patient's EKG/Telemetry data- sinus tachycardia      "   Medication Review:   Meds reviewed         Assessment/Plan    83-year-old female with acute on chronic diastolic heart failure due to dietary indiscretion possible high output due to anemia -IV diuretic upon admission.  Now on oral regimen appears stable  JAK2 positive myeloproliferative disorder   Anemia status post transfusion and hemoglobin is remaining stable  Coronary artery disease with previous stent to LAD and diagonal branch then in stent stenosis of the diagonal branch a few months later . 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 On clopidogrel.  Paroxysmal atrial fibrillation - on apixaban.  Needs to be discharged on low-dose beta-blocker  History of marantic endocarditis of the mitral valve  History of GI bleeding - if she has recurrent bleeding, may have to stop clopidogrel completely  Severe pulmonary hypertension    -She is now a little tachycardic.   I would favor resuming metoprolol succinate at a lower the dose to 12.5 mg daily given low normal blood pressure  -I will hold/DC lisinopril due to low blood pressure and mild acute kidney injury.  .-Creatinine has spiked and she is likely little dry.  Agree with 250 cc bolus per admitting.  We can always reevaluate diuretic regimen outpatient  -I believe observing her 1 more night would be ideal.  Discussed case with Dr.Jimmy Stan Whipple, APRN  10/01/23  15:15 EDT

## 2023-10-01 NOTE — PROGRESS NOTES
Name: Catherine Boyer ADMIT: 2023   : 1940  PCP: Kristi Moreau APRN    MRN: [id number] LOS: 0 days   AGE/SEX: 83 y.o. female  ROOM: [facility]     Subjective   Subjective   Patient seen and examined this morning.  Hospital day 4.  At time of my examination, she is resting comfortably in bed, she states that she feels okay today, still a little tired but no acute other complaints. Dysuria is improved, breathing stable.     Objective   Objective   Vital Signs  Temp:  [98.2 °F (36.8 °C)-98.6 °F (37 °C)] 98.2 °F (36.8 °C)  Heart Rate:  [103-109] 103  Resp:  [18] 18  BP: ()/(44-73) 108/73  SpO2:  [90 %-100 %] 90 %  on  Flow (L/min):  [1.5-2] 1.5;   Device (Oxygen Therapy): nasal cannula  Body mass index is 23.75 kg/m².  Physical Exam  Vitals and nursing note reviewed.   Constitutional:       General: She is not in acute distress.     Comments: Elderly, chronically ill-appearing   Eyes:      General: No scleral icterus.     Conjunctiva/sclera: Conjunctivae normal.   Cardiovascular:      Rate and Rhythm: Normal rate and regular rhythm.      Pulses: Normal pulses.      Heart sounds: Normal heart sounds.   Pulmonary:      Effort: Pulmonary effort is normal. No respiratory distress.      Breath sounds: No wheezing.   Abdominal:      General: Bowel sounds are normal. There is no distension.      Palpations: Abdomen is soft.      Tenderness: There is no abdominal tenderness.   Musculoskeletal:      Right lower leg: No edema.      Left lower leg: No edema.      Comments: Trace lower extremity edema   Skin:     General: Skin is warm and dry.      Coloration: Skin is pale.      Findings: Bruising (Scattered) present.   Neurological:      Mental Status: She is alert.   Psychiatric:         Mood and Affect: Mood normal.         Behavior: Behavior normal.     Results Review     I reviewed the patient's new clinical results.  Results from last 7 days   Lab Units 10/01/23  0448 23  0411 23  0348  09/28/23 0337   WBC 10*3/mm3 31.79* 33.63* 30.95* 31.76*   HEMOGLOBIN g/dL 9.9* 10.3* 9.2* 8.6*   PLATELETS 10*3/mm3 319 354 314 321       Results from last 7 days   Lab Units 10/01/23  0448 09/30/23  0411 09/29/23  0341 09/28/23  0337   SODIUM mmol/L 137 136 137 139   POTASSIUM mmol/L 4.4 4.4 4.1 4.3   CHLORIDE mmol/L 97* 93* 98 100   CO2 mmol/L 24.7 29.6* 28.5 28.0   BUN mg/dL 36* 23 20 15   CREATININE mg/dL 1.32* 1.02* 0.97 0.87   GLUCOSE mg/dL 108* 124* 113* 101*   EGFR mL/min/1.73 39.6* 54.0* 57.4* 65.4       Results from last 7 days   Lab Units 09/27/23  1156   ALBUMIN g/dL 4.2   BILIRUBIN mg/dL 0.6   ALK PHOS U/L 137*   AST (SGOT) U/L 20   ALT (SGPT) U/L 15       Results from last 7 days   Lab Units 10/01/23  0448 09/30/23  0411 09/29/23  0341 09/28/23  0337 09/27/23  1156   CALCIUM mg/dL 9.2 9.4 8.9 9.0 9.0   ALBUMIN g/dL  --   --   --   --  4.2   MAGNESIUM mg/dL  --   --   --   --  1.8   PHOSPHORUS mg/dL  --   --   --   --  2.5         Hemoglobin A1C   Date/Time Value Ref Range Status   09/29/2023 1006 6.60 (H) 4.80 - 5.60 % Final     Glucose   Date/Time Value Ref Range Status   10/01/2023 0619 121 70 - 130 mg/dL Final   09/30/2023 2058 120 70 - 130 mg/dL Final   09/30/2023 1651 149 (H) 70 - 130 mg/dL Final   09/30/2023 1106 152 (H) 70 - 130 mg/dL Final   09/30/2023 0636 135 (H) 70 - 130 mg/dL Final   09/29/2023 2039 128 70 - 130 mg/dL Final   09/29/2023 1657 109 70 - 130 mg/dL Final       No radiology results for the last day    I have personally reviewed all medications:  Scheduled Medications  apixaban, 5 mg, Oral, BID  cefTRIAXone, 1,000 mg, Intravenous, Q24H  clopidogrel, 75 mg, Oral, Daily  famotidine, 20 mg, Oral, Daily  hydrocortisone-bacitracin-zinc oxide-nystatin, 1 application , Topical, BID  insulin lispro, 2-7 Units, Subcutaneous, 4x Daily AC & at Bedtime  latanoprost, 1 drop, Both Eyes, Nightly  lisinopril, 2.5 mg, Oral, Daily  mupirocin, 1 application , Topical, Q12H  ruxolitinib, 15 mg,  Oral, BID  saccharomyces boulardii, 250 mg, Oral, BID  senna-docusate sodium, 2 tablet, Oral, BID  sertraline, 100 mg, Oral, Daily  sodium chloride, 10 mL, Intravenous, Q12H    Infusions   Diet  Diet: Cardiac Diets, Diabetic Diets; Healthy Heart (2-3 Na+); Consistent Carbohydrate; Texture: Regular Texture (IDDSI 7); Fluid Consistency: Thin (IDDSI 0)    I have personally reviewed:  [x]  Laboratory   [x]  Microbiology   [x]  Radiology   [x]  EKG/Telemetry  [x]  Cardiology/Vascular   []  Pathology    []  Records       Assessment/Plan     Active Hospital Problems    Diagnosis  POA    **Dyspnea [R06.00]  Yes    Anemia [D64.9]  Yes    Elevated troponin [R79.89]  Yes    Hypertension [I10]  Yes    GERD (gastroesophageal reflux disease) [K21.9]  Yes    Type 2 diabetes mellitus with circulatory disorder, without long-term current use of insulin [E11.59]  Yes    Paroxysmal atrial fibrillation [I48.0]  Yes    CAD (coronary artery disease) [I25.10]  Yes    Myeloproliferative disorder [D47.1]  Yes    Acute on chronic diastolic heart failure [I50.33]  Yes      Resolved Hospital Problems   No resolved problems to display.       83 y.o. female admitted with Dyspnea.    Acute kidney injury  - Etiology most likely pre-renal in nature, due to side effects of diuresis coupled with decreased oral intake. Creatinine 1.32 on most recent labs, has risen >0.3 over past 48 hours, consistent with KARLI, despite decreasing dose of diuretic.  - Hold AM dose of Furosemide and d/c for the interim. Cardiology has also d/c Lisinopril.  - Order 250 cc IVF fluid bolus over 2 hours, cautious monitoring given heart failure.  - Order repeat BMP after fluid bolus for reassessment to guide management. Further management based on results of testing.  - Order repeat BMP in AM for reassessment of creatinine,   - Avoid nephrotoxic medications, IVF, strict I/O, daily weights.    Acute on chronic HFpEF  Pulmonary hypertension  Elevated troponin  - Most recent 2D  "Echo prior to admission: (05/06/2023) showing EF 51 to 55%, low normal systolic function, mild septal asymmetric hypertrophy, moderately dilated left atrial cavity, mild aortic stenosis, elevated RVSP at 62 mmHg consistent with severe pulmonary hypertension, mild to moderate mitral regurgitation.  Current outpatient home diuretic: Furosemide 40 mg PO daily. Etiology of acute exacerbation likely 2/2 to increased sodium intake, leading to fluid retention.  - Imaging obtained on admission, reviewed, negative for acute cardiopulmonary abnormalities. ProBNP elevated at 6249.  Troponin slightly elevated but stable on repeat testing, likely a result of heart failure.  EKG showing sinus rhythm and premature atrial complexes, unchanged from prior tracing.  - Following arrival she was given x1 dose of IV Lasix and subsequently had her home dose of Furosemide increased to 40 mg PO BID. Respiratory status appears stable at present and overall much improved from admission, however, creatinine worsened, so diuretics now being held (see above).  - Cardiology consulted and following. Regent that patient stable at present. Will continue to follow their plans/recommendations. Greatly appreciate their help.  - Plan to continue Furosemide 40 mg BID as currently prescribed.    JAK2 positive myeloproliferative disorder  Chronic Leukocytosis  - Hematology/Oncology consulted and followed. Per their most recent note: \"Continue Jakafi 15 mg twice daily. We are planning a transition from Jakafi to momelotinib (Ojjaara) as this was just approved by the FDA for use in people with myelofibrosis and worsening anemia. My office is working to get this as it is a newly available drug.\"  - Most recent note reviewed, they have now signed off. Plan for outpatient follow up, appreciate their help.  - WBC elevated, chronic finding, relatively stable at this time, no indications to warrant acute intervention, however, do need to continue close monitoring. " Order repeat CBC in AM for reassessment.    Dysuria  Urinary tract infection  - Patient endorsing symptoms of dysuria on 09/29, further workup obtained. UA findings showing 3+ leukocyte esterase, TNTC WBC.  - Started empiric antibiotic therapy with ceftriaxone while awaiting results of testing. Urine culture showing 25,000 growth mixed marshall. Symptoms improving with Ceftriaxone.  - Will treat for 5 days total, can transition to oral antibiotics if discharged before then.  - Acute urinary retention protocol.    Paroxysmal Atrial fibrillation on long term anticoagulation  - [id number] score: 9.  - Vitals, telemetry reviewed, slightly more tachycardic today, so cardiology is adjusting beta blocker. On Eliquis for AC.  - Continue current treatment as prescribed. Continue telemetry monitoring. Follow cardiology plans and recommendations.    Coronary artery disease  - Patient appears stable from this perspective at this time, denies any chest pain, palpitations, no signs/symptoms to suggest ACS. She is on Lisinopril, Metoprolol Succinate and Plavix long term, but lisinopril now being held in setting of KARLI.  - Continue current treatments as prescribed. Will continue to monitor.  - Continue telemetry monitoring.    Hypertension  - Blood pressure appears stable and acceptable acutely at this time. No indications to warrant acute changes/intervention at this time.  - Continue current medications as prescribed. Trend BP to guide ongoing management decisions.    Type 2 diabetes mellitus with hyperglycemia  - Most recent A1c: 6.70% (11/28/2022).  Repeat hemoglobin A1c 6.60% (09/29/2023).  - Home medication regimen: None.  - Current treatment regimen: Correctional SSI.  - Per review of POC glucose checks, blood glucose does appear to be controlled within target inpatient range at this time, and does not warrant changes to insulin regimen.  - Continue current treatment as prescribed with SSI.  - Will monitor 24 hour insulin  requirements and adjust as needed to achieve target inpatient range of 140-180.  - Diabetic diet    Anemia  - Hemoglobin low on most recent labs, stable from prior. S/P PRBC transfusion on admission. No evidence of overt blood loss.  Hematology/oncology consulted and followed, but has since signed off.  No indications to warrant further acute intervention at this time.  - Order repeat CBC in AM for reassessment. Continue to monitor, transfuse for hemoglobin <7.    Generalized fatigue and weakness  - Noted on arrival, etiology likely multifactorial in the setting of myeloproliferative disorder, fluid overload, anemia and other chronic medical issues.  - Treatment for contributing etiologies as discussed elsewhere.  - Plan is for patient to return to assisted living facility at discharge.  - PT/OT, fall precautions.      SCDs for DVT prophylaxis.  Limited code (no CPR, no intubation).  Discussed with patient and nursing staff.  Anticipate discharge  assisted living facility   this afternoon or tomorrow based on creatinine      Reuben Pierce DO  [facility]  10/01/23

## 2023-10-01 NOTE — PLAN OF CARE
Goal Outcome Evaluation:  Plan of Care Reviewed With: patient, daughter        Progress: no change  Outcome Evaluation: Pt seen by PT this PM for tx. Pt was in bed and sat up to EOB w/ SBA. Pt stood and amb approx 55' req CGA and use of fww. Pt slow w/ no overt LOB. Pt amb on RA w/ sats at 94%  after returning to room to sit in chair and quickly christiano to 97%. Pt's sats at 94% at end of session. Notified RN. PT will prog as pt so.      Anticipated Discharge Disposition (PT): assisted living

## 2023-10-01 NOTE — THERAPY TREATMENT NOTE
Patient Name: Catherine Boyer  : 1940    MRN: [id number]                              Today's Date: 10/1/2023       Admit Date: 2023    Visit Dx:     ICD-10-CM ICD-9-CM   1. Dyspnea, unspecified type  R06.00 786.09   2. Other fatigue  R53.83 780.79   3. Anemia, unspecified type  D64.9 285.9     Patient Active Problem List   Diagnosis    Acute on chronic diastolic heart failure    CAD (coronary artery disease)    Nonbacterial thrombotic endocarditis    Paroxysmal atrial fibrillation    Myeloproliferative disorder    Microcytic anemia    Type 2 diabetes mellitus with circulatory disorder, without long-term current use of insulin    GERD (gastroesophageal reflux disease)    Hypertension    Personal history of transient ischemic attack (TIA), 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 deficits    Porcelain gallbladder    Breast cancer    Chronic diastolic heart failure    Gastritis    Atherosclerosis of abdominal aorta    APC (atrial premature contractions)    Moderate malnutrition    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    KARLI (acute kidney injury)    Intractable nausea and vomiting    Dyspnea    Elevated troponin    TIA (transient ischemic attack)    PUD (peptic ulcer disease)    Anemia     Past Medical History:   Diagnosis Date    Atherosclerosis of abdominal aorta 2023    Atrial fibrillation     Breast cancer     CAD (coronary artery disease)     NSTEMI 2022: 90% ostial LAD, 99% D1, 70% mid-distal LAD (medical therapy). She received two stents (2.5x18 and 2.5x26mm [place] LEFTY) but I don't know which one went to which lesion.    Carotid atherosclerosis     Cholecystitis 2022    Added automatically from request for surgery [id number]    Chronic diastolic (congestive) heart failure     COVID 10/29/2022    GERD (gastroesophageal reflux disease)     Glaucoma     History of cataract     Hypertension     Microcytic anemia     per Dr. oleg pate office  note 22-dd    Myeloproliferative disorder     JAK2  positive    Nonbacterial thrombotic endocarditis     6/2021: 4x5mm vegetation on the ventricular surface of the anterior MV, negative blood cultures    Porcelain gallbladder     PUD (peptic ulcer disease)     TIA (transient ischemic attack)     Type 2 diabetes mellitus     Type 2 diabetes mellitus with circulatory disorder, without long-term current use of insulin 07/14/2022    Upper GI bleed      Past Surgical History:   Procedure Laterality Date    BREAST LUMPECTOMY  1999    CARDIAC CATHETERIZATION N/A 09/02/2022    Procedure: Coronary angiography;  Surgeon: Guero Verde MD;  Location: [facility] CATH INVASIVE LOCATION;  Service: Cardiovascular;  Laterality: N/A;    CARDIAC CATHETERIZATION N/A 09/02/2022    Procedure: Stent LEFTY coronary;  Surgeon: Guero Verde MD;  Location: [facility] CATH INVASIVE LOCATION;  Service: Cardiovascular;  Laterality: N/A;    CATARACT EXTRACTION  2011    CHOLECYSTECTOMY      CHOLECYSTECTOMY WITH INTRAOPERATIVE CHOLANGIOGRAM N/A 11/28/2022    Procedure: CHOLECYSTECTOMY LAPAROSCOPIC INTRAOPERATIVE CHOLANGIOGRAM;  Surgeon: Dani Grover Jr., MD;  Location: [facility] MAIN OR;  Service: General;  Laterality: N/A;    COLONOSCOPY N/A 02/09/2023    Procedure: COLONOSCOPY TO CECUM & T.I.;  Surgeon: Guero Ferris MD;  Location: [facility] ENDOSCOPY;  Service: Gastroenterology;  Laterality: N/A;  PRE- MELENA, GI BLEED  POST- DIVERTICULOSIS, INT HEMORRHOIDS    ENDOSCOPY N/A 01/25/2023    Procedure: ESOPHAGOGASTRODUODENOSCOPY WITH BIOPSIES;  Surgeon: Charles Diaz MD;  Location: [facility] ENDOSCOPY;  Service: Gastroenterology;  Laterality: N/A;  pre: abd pain, nausea  post: hiatal hernia, mild gastritis, sloughing of the esophagus    ENTEROSCOPY SMALL BOWEL N/A 02/09/2023    Procedure: ENTEROSCOPY SMALL BOWEL;  Surgeon: Guero Ferris MD;  Location: [facility] ENDOSCOPY;  Service: Gastroenterology;  Laterality: N/A;  PRE- MELENA, GI BLEED  POST- HIATAL HERNIA    JOINT REPLACEMENT      UPPER  GASTROINTESTINAL ENDOSCOPY        General Information       Row Name 10/01/23 1309          Physical Therapy Time and Intention    Document Type therapy note (daily note)  -PH     Mode of Treatment physical therapy  -PH       Row Name 10/01/23 1309          General Information    Existing Precautions/Restrictions oxygen therapy device and L/min;fall  -PH     Barriers to Rehab hearing deficit  -PH       Row Name 10/01/23 1309          Cognition    Orientation Status (Cognition) oriented x 3  -PH       Row Name 10/01/23 1309          Safety Issues, Functional Mobility    Impairments Affecting Function (Mobility) endurance/activity tolerance;strength  -PH     Comment, Safety Issues/Impairments (Mobility) gt belt and non skid socks donned;  -PH               User Key  (r) = Recorded By, (t) = Taken By, (c) = Cosigned By      Initials Name Provider Type    PH Kaitlynn Luo PTA Physical Therapist Assistant                   Mobility       Row Name 10/01/23 1311          Bed Mobility    Bed Mobility supine-sit  -PH     Supine-Sit [place] (Bed Mobility) standby assist  -PH     Assistive Device (Bed Mobility) bed rails;head of bed elevated  -PH       Row Name 10/01/23 1311          Sit-Stand Transfer    Sit-Stand [place] (Transfers) contact guard  -PH     Assistive Device (Sit-Stand Transfers) walker, front-wheeled  -PH       Row Name 10/01/23 1311          Gait/Stairs (Locomotion)    [place] Level (Gait) contact guard  -PH     Assistive Device (Gait) walker, front-wheeled  -PH     Distance in Feet (Gait) 55'  -PH     Deviations/Abnormal Patterns (Gait) jeanna decreased;gait speed decreased  -PH     Bilateral Gait Deviations forward flexed posture  -PH     Comment, (Gait/Stairs) cues for postural correction; pt amb on RA w/ sats at 94% after returning to room and sitting chair. Sats quickly christiano to 97%  -PH               User Key  (r) = Recorded By, (t) = Taken By, (c) = Cosigned By      Initials  Name Provider Type     Kaitlynn Luo PTA Physical Therapist Assistant                   Obj/Interventions       Row Name 10/01/23 1312          Balance    Balance Assessment sitting static balance;standing static balance  -PH     Static Sitting Balance independent  -PH     Static Standing Balance standby assist  -PH     Position/Device Used, Standing Balance walker, front-wheeled  -PH               User Key  (r) = Recorded By, (t) = Taken By, (c) = Cosigned By      Initials Name Provider Type     Kaitlynn Luo PTA Physical Therapist Assistant                   Goals/Plan    No documentation.                  Clinical Impression       Row Name 10/01/23 1313          Pain    Pretreatment Pain Rating 0/10 - no pain  -PH     Posttreatment Pain Rating 0/10 - no pain  -PH     Additional Documentation Pain Scale: Numbers Pre/Post-Treatment (Group)  -PH       Row Name 10/01/23 1313          Plan of Care Review    Plan of Care Reviewed With patient;daughter  -PH     Progress no change  -PH     Outcome Evaluation Pt seen by PT this PM for tx. Pt was in bed and sat up to EOB w/ SBA. Pt stood and amb approx 55' req CGA and use of fww. Pt slow w/ no overt LOB. Pt amb on RA w/ sats at 94%  after returning to room to sit in chair and quickly christiano to 97%. Pt's sats at 94% at end of session. Notified RN. PT will prog as pt so.  -PH       Row Name 10/01/23 1313          Vital Signs    Pre SpO2 (%) 97  -PH     O2 Delivery Pre Treatment supplemental O2  -PH     Intra SpO2 (%) 92  -PH     O2 Delivery Intra Treatment room air  -PH     Post SpO2 (%) 94  -PH     O2 Delivery Post Treatment room air  -PH       Row Name 10/01/23 1313          Positioning and Restraints    Pre-Treatment Position in bed  -PH     Post Treatment Position chair  -PH     In Chair reclined;call light within reach;encouraged to call for assist;exit alarm on;with family/caregiver;notified nsg  -PH               User Key  (r) = Recorded By, (t)  = Taken By, (c) = Cosigned By      Initials Name Provider Type     Kaitlynn Luo PTA Physical Therapist Assistant                   Outcome Measures       Row Name 10/01/23 1315          How much help from another person do you currently need...    Turning from your back to your side while in flat bed without using bedrails? 3  -PH     Moving from lying on back to sitting on the side of a flat bed without bedrails? 3  -PH     Moving to and from a bed to a chair (including a wheelchair)? 3  -PH     Standing up from a chair using your arms (e.g., wheelchair, bedside chair)? 3  -PH     Climbing 3-5 steps with a railing? 3  -PH     To walk in hospital room? 3  -PH     AM-PAC 6 Clicks Score (PT) 18  -PH     Highest level of mobility 6 --> Walked 10 steps or more  -PH       Row Name 10/01/23 1315          Functional Assessment    Outcome Measure Options AM-PAC 6 Clicks Basic Mobility (PT)  -PH               User Key  (r) = Recorded By, (t) = Taken By, (c) = Cosigned By      Initials Name Provider Type     Kaitlynn Luo PTA Physical Therapist Assistant                                 Physical Therapy Education       Title: PT OT SLP Therapies (Done)       Topic: Physical Therapy (Done)       Point: Mobility training (Done)       Learning Progress Summary             Patient Acceptance, E,TB, VU by  at 10/1/2023 1315    Acceptance, E,D, VU,NR by MS at 9/29/2023 1533    Acceptance, E,D, VU,NR by MS at 9/28/2023 1121                         Point: Home exercise program (Done)       Learning Progress Summary             Patient Acceptance, E,TB, VU by  at 10/1/2023 1315    Acceptance, E,D, VU,NR by MS at 9/29/2023 1533                         Point: Body mechanics (Done)       Learning Progress Summary             Patient Acceptance, E,TB, VU by  at 10/1/2023 1315    Acceptance, E,D, VU,NR by MS at 9/29/2023 1533    Acceptance, E,D, VU,NR by MS at 9/28/2023 1121                         Point:  Precautions (Done)       Learning Progress Summary             Patient Acceptance, E,TB, VU by  at 10/1/2023 1315    Acceptance, E,D, VU,NR by MS at 9/29/2023 1533    Acceptance, E,D, VU,NR by MS at 9/28/2023 1121                                         User Key       Initials Effective Dates Name Provider Type Discipline    MS 06/16/21 -  Kenton Song, PT Physical Therapist PT     06/16/21 -  Kaitlynn Luo PTA Physical Therapist Assistant PT                  PT Recommendation and Plan     Plan of Care Reviewed With: patient, daughter  Progress: no change  Outcome Evaluation: Pt seen by PT this PM for tx. Pt was in bed and sat up to EOB w/ SBA. Pt stood and amb approx 55' req CGA and use of fww. Pt slow w/ no overt LOB. Pt amb on RA w/ sats at 94%  after returning to room to sit in chair and quickly christiano to 97%. Pt's sats at 94% at end of session. Notified RN. PT will prog as pt so.     Time Calculation:         PT Charges       Row Name 10/01/23 1315             Time Calculation    Start Time 1256  -PH      Stop Time 1308  -PH      Time Calculation (min) 12 min  -PH      PT Received On 10/01/23  -PH      PT - Next Appointment 10/02/23  -PH         Timed Charges    08737 - PT Therapeutic Activity Minutes 12  -PH         Total Minutes    Timed Charges Total Minutes 12  -PH       Total Minutes 12  -PH                User Key  (r) = Recorded By, (t) = Taken By, (c) = Cosigned By      Initials Name Provider Type     Kaitlynn Luo PTA Physical Therapist Assistant                  Therapy Charges for Today       Code Description Service Date Service Provider Modifiers Qty    [id number]  PT THERAPEUTIC ACT EA 15 MIN 10/1/2023 Kaitlynn Luo PTA GP 1            PT G-Codes  Outcome Measure Options: AM-PAC 6 Clicks Basic Mobility (PT)  AM-PAC 6 Clicks Score (PT): 18  AM-PAC 6 Clicks Score (OT): 18  PT Discharge Summary  Anticipated Discharge Disposition (PT): assisted  living    Kaitlynn Luo, PTA  10/1/2023

## 2023-10-02 VITALS
HEART RATE: 94 BPM | DIASTOLIC BLOOD PRESSURE: 43 MMHG | TEMPERATURE: 98.7 F | HEIGHT: 64 IN | WEIGHT: 142.42 LBS | OXYGEN SATURATION: 95 % | RESPIRATION RATE: 17 BRPM | BODY MASS INDEX: 24.31 KG/M2 | SYSTOLIC BLOOD PRESSURE: 114 MMHG

## 2023-10-02 PROBLEM — I50.33 ACUTE ON CHRONIC HEART FAILURE WITH PRESERVED EJECTION FRACTION (HFPEF): Status: ACTIVE | Noted: 2023-10-02

## 2023-10-02 LAB
ANION GAP SERPL CALCULATED.3IONS-SCNC: 16.4 MMOL/L (ref 5–15)
ANISOCYTOSIS BLD QL: ABNORMAL
BUN SERPL-MCNC: 31 MG/DL (ref 8–23)
BUN/CREAT SERPL: 32 (ref 7–25)
BURR CELLS BLD QL SMEAR: ABNORMAL
CALCIUM SPEC-SCNC: 9.4 MG/DL (ref 8.6–10.5)
CHLORIDE SERPL-SCNC: 96 MMOL/L (ref 98–107)
CO2 SERPL-SCNC: 22.6 MMOL/L (ref 22–29)
CREAT SERPL-MCNC: 0.97 MG/DL (ref 0.57–1)
DEPRECATED RDW RBC AUTO: 59.2 FL (ref 37–54)
EGFRCR SERPLBLD CKD-EPI 2021: 57.4 ML/MIN/1.73
EOSINOPHIL # BLD MANUAL: 0.35 10*3/MM3 (ref 0–0.4)
EOSINOPHIL NFR BLD MANUAL: 1 % (ref 0.3–6.2)
ERYTHROCYTE [DISTWIDTH] IN BLOOD BY AUTOMATED COUNT: 17.5 % (ref 12.3–15.4)
GLUCOSE BLDC GLUCOMTR-MCNC: 116 MG/DL (ref 70–130)
GLUCOSE BLDC GLUCOMTR-MCNC: 132 MG/DL (ref 70–130)
GLUCOSE SERPL-MCNC: 99 MG/DL (ref 65–99)
HCT VFR BLD AUTO: 29.9 % (ref 34–46.6)
HGB BLD-MCNC: 9.7 G/DL (ref 12–15.9)
HYPOCHROMIA BLD QL: ABNORMAL
LYMPHOCYTES # BLD MANUAL: 4.5 10*3/MM3 (ref 0.7–3.1)
LYMPHOCYTES NFR BLD MANUAL: 18 % (ref 5–12)
MCH RBC QN AUTO: 30.3 PG (ref 26.6–33)
MCHC RBC AUTO-ENTMCNC: 32.4 G/DL (ref 31.5–35.7)
MCV RBC AUTO: 93.4 FL (ref 79–97)
METAMYELOCYTES NFR BLD MANUAL: 1 % (ref 0–0)
MONOCYTES # BLD: 6.23 10*3/MM3 (ref 0.1–0.9)
MYELOCYTES NFR BLD MANUAL: 1 % (ref 0–0)
NEUTROPHILS # BLD AUTO: 22.85 10*3/MM3 (ref 1.7–7)
NEUTROPHILS NFR BLD MANUAL: 66 % (ref 42.7–76)
PLAT MORPH BLD: NORMAL
PLATELET # BLD AUTO: 313 10*3/MM3 (ref 140–450)
PMV BLD AUTO: 11 FL (ref 6–12)
POIKILOCYTOSIS BLD QL SMEAR: ABNORMAL
POTASSIUM SERPL-SCNC: 4.7 MMOL/L (ref 3.5–5.2)
RBC # BLD AUTO: 3.2 10*6/MM3 (ref 3.77–5.28)
SODIUM SERPL-SCNC: 135 MMOL/L (ref 136–145)
TOXIC GRANULATION: ABNORMAL
VARIANT LYMPHS NFR BLD MANUAL: 13 % (ref 19.6–45.3)
WBC NRBC COR # BLD: 34.62 10*3/MM3 (ref 3.4–10.8)

## 2023-10-02 PROCEDURE — 25010000002 CEFTRIAXONE PER 250 MG: Performed by: STUDENT IN AN ORGANIZED HEALTH CARE EDUCATION/TRAINING PROGRAM

## 2023-10-02 PROCEDURE — 99214 OFFICE O/P EST MOD 30 MIN: CPT | Performed by: NURSE PRACTITIONER

## 2023-10-02 PROCEDURE — G0378 HOSPITAL OBSERVATION PER HR: HCPCS

## 2023-10-02 PROCEDURE — 85025 COMPLETE CBC W/AUTO DIFF WBC: CPT | Performed by: STUDENT IN AN ORGANIZED HEALTH CARE EDUCATION/TRAINING PROGRAM

## 2023-10-02 PROCEDURE — 80048 BASIC METABOLIC PNL TOTAL CA: CPT | Performed by: STUDENT IN AN ORGANIZED HEALTH CARE EDUCATION/TRAINING PROGRAM

## 2023-10-02 PROCEDURE — 85007 BL SMEAR W/DIFF WBC COUNT: CPT | Performed by: STUDENT IN AN ORGANIZED HEALTH CARE EDUCATION/TRAINING PROGRAM

## 2023-10-02 PROCEDURE — 82948 REAGENT STRIP/BLOOD GLUCOSE: CPT

## 2023-10-02 RX ORDER — NALOXONE HYDROCHLORIDE 4 MG/.1ML
SPRAY NASAL
Qty: 2 EACH | Refills: 0 | Status: SHIPPED | OUTPATIENT
Start: 2023-10-02

## 2023-10-02 RX ORDER — CEFDINIR 300 MG/1
300 CAPSULE ORAL 2 TIMES DAILY
Qty: 4 CAPSULE | Refills: 0 | Status: SHIPPED | OUTPATIENT
Start: 2023-10-02 | End: 2023-10-04

## 2023-10-02 RX ORDER — METOPROLOL SUCCINATE 25 MG/1
12.5 TABLET, EXTENDED RELEASE ORAL DAILY
Qty: 30 TABLET | Refills: 0 | Status: SHIPPED | OUTPATIENT
Start: 2023-10-02

## 2023-10-02 RX ORDER — HYDROCODONE BITARTRATE AND ACETAMINOPHEN 5; 325 MG/1; MG/1
1 TABLET ORAL EVERY 6 HOURS PRN
Qty: 12 TABLET | Refills: 0 | Status: SHIPPED | OUTPATIENT
Start: 2023-10-02

## 2023-10-02 RX ADMIN — Medication 250 MG: at 08:12

## 2023-10-02 RX ADMIN — APIXABAN 5 MG: 5 TABLET, FILM COATED ORAL at 08:12

## 2023-10-02 RX ADMIN — CLOPIDOGREL BISULFATE 75 MG: 75 TABLET, FILM COATED ORAL at 08:12

## 2023-10-02 RX ADMIN — METOPROLOL SUCCINATE 12.5 MG: 25 TABLET, EXTENDED RELEASE ORAL at 08:11

## 2023-10-02 RX ADMIN — FAMOTIDINE 20 MG: 20 TABLET, FILM COATED ORAL at 08:12

## 2023-10-02 RX ADMIN — SENNOSIDES AND DOCUSATE SODIUM 2 TABLET: 50; 8.6 TABLET ORAL at 08:12

## 2023-10-02 RX ADMIN — SERTRALINE 100 MG: 100 TABLET, FILM COATED ORAL at 08:11

## 2023-10-02 RX ADMIN — CEFTRIAXONE SODIUM 1000 MG: 1 INJECTION, POWDER, FOR SOLUTION INTRAMUSCULAR; INTRAVENOUS at 08:12

## 2023-10-02 RX ADMIN — MUPIROCIN 1 APPLICATION: 20 OINTMENT TOPICAL at 08:12

## 2023-10-02 RX ADMIN — Medication 10 ML: at 08:12

## 2023-10-02 NOTE — PLAN OF CARE
Goal Outcome Evaluation:  Plan of Care Reviewed With: patient           Outcome Evaluation: D/C [facility] to [facility] today. Brother providing transportation. Sent with [facility] supplied med.

## 2023-10-02 NOTE — DISCHARGE SUMMARY
Date of Admission: 9/27/2023  Date of Discharge:  10/2/2023  Primary Care Physician: Kristi Moreau APRN     Discharge Diagnosis:  Active Hospital Problems    Diagnosis  POA    **Dyspnea [R06.00]  Yes    Acute on chronic heart failure with preserved ejection fraction (HFpEF) [I50.33]  Yes    Anemia [D64.9]  Yes    Elevated troponin [R79.89]  Yes    Hypertension [I10]  Yes    GERD (gastroesophageal reflux disease) [K21.9]  Yes    Type 2 diabetes mellitus with circulatory disorder, without long-term current use of insulin [E11.59]  Yes    Paroxysmal atrial fibrillation [I48.0]  Yes    CAD (coronary artery disease) [I25.10]  Yes    Myeloproliferative disorder [D47.1]  Yes    Acute on chronic diastolic heart failure [I50.33]  Yes      Resolved Hospital Problems   No resolved problems to display.       Presenting Problem/History of Present Illness from H&P:  Dyspnea [R06.00]  Other fatigue [R53.83]  Anemia, unspecified type [D64.9]  Dyspnea, unspecified type [R06.00]  Acute on chronic heart failure with preserved ejection fraction (HFpEF) [I50.33]     Patient is a 83 y.o. female presents with history of myelodysplastic syndrome occasionally requiring transfusion who presents to the ER due to dyspnea on exertion.  Patient was found to have a hemoglobin of 8.0 and O2 sats of 89% on room air.  Patient has a history of diastolic heart failure and BNP is elevated.  Normally takes Lasix 40 mg a day.  Blood pressure was borderline low therefore not wanting to aggressively diurese.  Hematology 1 to see if diuresis would help the patient before starting to give blood.     Patient admits to eating Chinese food a few days ago.  She has had 3 to 4 days of shortness of breath and generalized weakness.  Denies any sore throat no cough.  No other COVID symptoms.  She is on Eliquis at home.    Hospital Course:  The patient is a 83 y.o. female who presented with   Presented with weakness, fatigue and shortness of breath    Acute on  chronic HFpEF  Pulmonary hypertension  - Most recent 2D Echo prior to admission: (05/06/2023) showing EF 51 to 55%, low normal systolic function, mild septal asymmetric hypertrophy, moderately dilated left atrial cavity, mild aortic stenosis, elevated RVSP at 62 mmHg consistent with severe pulmonary hypertension, mild to moderate mitral regurgitation.    -She was given IV diuresis with improvement.  She did develop slight KARLI and so diuretic was held.  Her creatinine is improved today so we will resume her with her home dose of 40 mg of p.o. Lasix per day.  She is not on chronic potassium supplement and did not require any potassium replacement while here.  -Cardiology evaluated and plan on outpatient follow-up.    JAK2 positive myeloproliferative disorder  Chronic Leukocytosis  - Hematology/Oncology consulted and followed.  -They are working on obtaining approval for Ojjaara and transitioning on off the Jakafi eventually as an outpatient.  Outpatient follow-up is planned.       Urinary tract infection  -Dysuria present on admission.  Urine culture no growth to date.  Responded well to Rocephin so will transition to cefdinir to complete antibiotic course.    Paroxysmal Atrial fibrillation on long term anticoagulation  - [id number] score: 9.  Chronic Eliquis.  Rate okay.  Metoprolol dose was decreased due to borderline hypotension.  Her ACE inhibitor was also held due to this.  Adjust as outpatient depending on trend.     Hypertension  -See above    Type 2 diabetes mellitus with hyperglycemia  -Hemoglobin A1c 6.6.  Continue diet control and outpatient follow-up.     Anemia  -Continue outpatient follow-up and management per oncology    Exam Today:  General AA NAD, chronically ill-appearing  HEENT NCAT  CV RRR  Lungs CTA B  Abdomen ND NT  Extremity no cyanosis or edema  Neuro CN II through XII grossly intact  Psych normal mood and affect    Results:  CXR  Negative.     Procedures Performed:         Consults:    Consults       Date and Time Order Name Status Description    9/28/2023  6:23 PM Inpatient Cardiology Consult Completed     9/27/2023  1:09 PM Hematology and Oncology (on-call MD unless specified) Completed              Discharge Disposition:  Rehab Facility or Unit (DC - External)    Discharge Medications:     Discharge Medications        New Medications        Instructions Start Date   cefdinir 300 MG capsule  Commonly known as: OMNICEF   300 mg, Oral, 2 Times Daily      naloxone 4 MG/0.1ML nasal spray  Commonly known as: NARCAN   Call 911. Don't prime. [place] in 1 nostril for overdose. Repeat in 2-3 minutes in other nostril if no or minimal breathing/responsiveness.             Changes to Medications        Instructions Start Date   HYDROcodone-acetaminophen 5-325 MG per tablet  Commonly known as: NORCO  What changed:   how much to take  when to take this  reasons to take this  additional instructions   1 tablet, Oral, Every 6 Hours PRN      metoprolol succinate XL 25 MG 24 hr tablet  Commonly known as: TOPROL-XL  What changed:   how much to take  when to take this   12.5 mg, Oral, Daily             Continue These Medications        Instructions Start Date   acetaminophen 500 MG tablet  Commonly known as: TYLENOL   500 mg, Oral, As Needed      apixaban 5 MG tablet tablet  Commonly known as: ELIQUIS   5 mg, Oral, 2 Times Daily      clopidogrel 75 MG tablet  Commonly known as: PLAVIX   TAKE 1 TABLET DAILY      famotidine 20 MG tablet  Commonly known as: PEPCID   20 mg, Oral, Daily      furosemide 40 MG tablet  Commonly known as: LASIX   TAKE 1 TABLET DAILY      Gas Relief 80 MG chewable tablet  Generic drug: simethicone   Chew 1 tablet by mouth 4 (Four) Times a Day As Needed for Flatulence.      hydrocortisone-bacitracin-zinc oxide-nystatin  Commonly known as: MAGIC BARRIER   1 application , Topical, 2 Times Daily      Jakafi 15 MG tablet  Generic drug: ruxolitinib   15 mg, Oral, 2 Times Daily      latanoprost  0.005 % ophthalmic solution  Commonly known as: XALATAN   1 drop, Both Eyes      loperamide 2 MG capsule  Commonly known as: IMODIUM   2 mg, Oral, 4 Times Daily PRN      ondansetron 4 MG tablet  Commonly known as: Zofran   4 mg, Oral, Every 8 Hours PRN      saccharomyces boulardii 250 MG capsule  Commonly known as: FLORASTOR   250 mg, Oral, 2 Times Daily      sertraline 100 MG tablet  Commonly known as: ZOLOFT   100 mg, Oral, Daily             Stop These Medications      lisinopril 2.5 MG tablet  Commonly known as: PRINIVIL,ZESTRIL     pantoprazole 40 MG EC tablet  Commonly known as: PROTONIX     traZODone 50 MG tablet  Commonly known as: DESYREL              Discharge Diet:   Diet Instructions       Diet: Cardiac Diets, Diabetic Diets; Healthy Heart (2-3 Na+); Regular Texture (IDDSI 7); Thin (IDDSI 0); Consistent Carbohydrate      Discharge Diet:  Cardiac Diets  Diabetic Diets       Cardiac Diet: Healthy Heart (2-3 Na+)    Texture: Regular Texture (IDDSI 7)    Fluid Consistency: Thin (IDDSI 0)    Diabetic Diet: Consistent Carbohydrate            Activity at Discharge:   Activity Instructions       Activity as Tolerated              Follow-up Appointments:   Contact information for follow-up providers       Kristi Moreau APRN .    Specialty: Nurse Practitioner  Contact information:  [address]  [address]  [facility] [id number] [phone]               Albin Barriga MD Follow up in 1 week(s).    Specialty: Cardiology  Why: 1-2 week NP follow up  Contact information:  [address]  [facility] [id number] [phone]                       Contact information for after-discharge care       Home Medical Care       [facility] .    Service: Home Health Services  Contact information:  [address]  [place] [facility] [address] [phone]                                   Test Results Pending at Discharge:  Pending Labs       Order Current Status    Basic  Metabolic Panel Preliminary result             Karan Hou MD  10/02/23  11:58 EDT    Time Spent on Discharge Activities: >30 minutes    Dictated portions using Dragon dictation software.

## 2023-10-02 NOTE — PROGRESS NOTES
"CC: Follow-up diastolic ingestive heart failure, coronary artery disease, paroxysmal atrial fibrillation and pulmonary hypertension    Interval History: She reports that she feels tired.  She remains on 2 L nasal cannula but reports she is only using oxygen at night at home.      Vital Signs  Temp:  [98.2 °F (36.8 °C)-98.9 °F (37.2 °C)] 98.9 °F (37.2 °C)  Heart Rate:  [] 91  Resp:  [17-18] 17  BP: (101-112)/(48-72) 112/72  No intake or output data in the 24 hours ending 10/02/23 1214    Flowsheet Rows      Flowsheet Row First Filed Value   Admission Height 162.6 cm (64.02\") Documented at 09/27/2023 1219   Admission Weight 68.6 kg (151 lb 3.2 oz) Documented at 09/27/2023 1219            PHYSICAL EXAM:  General: No acute distress  Resp:NL Rate, unlabored, clear, few scattered expiratory wheezes  CV:NL rate and tachycardic rhythm, NL PMI, Nl S1 and S2, + Murmur, no gallop, no rub, No JVD. Normal pedal pulses  ABD:Nl sounds, no masses or tenderness, nondistended, no guarding or rebound  Neuro: alert,cooperative and oriented  Extr: No edema or cyanosis, moves all extremities      Results Review:    Results from last 7 days   Lab Units 10/02/23  0427 10/01/23  1421   SODIUM mmol/L 135* 138   POTASSIUM mmol/L 4.7 5.0   CHLORIDE mmol/L 96* 95*   CO2 mmol/L  --  26.0   BUN mg/dL 31* 37*   CREATININE mg/dL 0.97 1.24*   GLUCOSE mg/dL 99 145*   CALCIUM mg/dL 9.4 9.8       Results from last 7 days   Lab Units 09/27/23  1800 09/27/23  1554 09/27/23  1156   HSTROP T ng/L 47* 43* 46*       Results from last 7 days   Lab Units 10/02/23  0427   WBC 10*3/mm3 34.62*   HEMOGLOBIN g/dL 9.7*   HEMATOCRIT % 29.9*   PLATELETS 10*3/mm3 313       Results from last 7 days   Lab Units 09/27/23  1156   INR  1.48*   APTT seconds 47.9*           Results from last 7 days   Lab Units 09/27/23  1156   MAGNESIUM mg/dL 1.8           I reviewed the patient's new clinical results.  I personally viewed and interpreted the patient's EKG/Telemetry " data- sinus tachycardia        Medication Review:   Meds reviewed         Assessment/Plan    83-year-old female with acute on chronic diastolic heart failure due to dietary indiscretion possible high output due to anemia -IV diuretic upon admission.  Oral regimen to be resumed at discharge.  JAK2 positive myeloproliferative disorder; medications to be adjusted per hematology due to anemia.  Anemia status post transfusion and hemoglobin remained stable.  Coronary artery disease with previous stent to LAD and diagonal branch then in stent stenosis of the diagonal branch a few months later. 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 On clopidogrel.  She denies chest pain.  Lisinopril held due to KARLI and soft BP.  Paroxysmal atrial fibrillation - on apixaban and low-dose beta-blocker.  History of marantic endocarditis of the mitral valve  History of GI bleeding - if she has recurrent bleeding, may have to stop clopidogrel completely  Severe pulmonary hypertension    -Heart rate better controlled on 12.5 mg metoprolol succinate daily.  Would continue.  -We will continue to hold lisinopril at this time.  We will resume her home Lasix as her renal function is improved and her weight is up 4 pounds today.  .-Okay for discharge; she has follow-up with Estrellita Blanco in our office on October 18, 2023.    María Lamas, APRN  10/02/23  12:14 EDT

## 2023-10-02 NOTE — CASE MANAGEMENT/SOCIAL WORK
Case Management Discharge Note      Final Note: [facility] via family, [facility]. No additional CCP needs.    Provided Post Acute Provider List?: N/A  N/A Provider List Comment: pt current and wants to stay with [facility]    Selected Continued Care - Admitted Since 9/27/2023       Destination    No services have been selected for the patient.                Durable Medical Equipment    No services have been selected for the patient.                Dialysis/Infusion    No services have been selected for the patient.                Home Medical Care       Service Provider Selected Services Address Phone Fax Patient Preferred    [facility] [address], [address], [place] [address] [phone] --              Therapy    No services have been selected for the patient.                Community Resources    No services have been selected for the patient.                Community & DME    No services have been selected for the patient.                    Selected Continued Care - Episodes Includes continued care and service providers with selected services from the active episodes listed below      Oncology Episode start date: 5/18/2022   There are no active outsourced providers for this episode.                      Final Discharge Disposition Code: 06 - home with home health care

## 2023-10-05 ENCOUNTER — DOCUMENTATION (OUTPATIENT)
Dept: PHARMACY | Facility: HOSPITAL | Age: 83
End: 2023-10-05
Payer: MEDICARE

## 2023-10-05 NOTE — PROGRESS NOTES
I spoke with Karen, pts daughter, about the supply of Jakafi that pt has on hand. She will get an exact count and call me back. Plan is to change pt to [facility] within the next couple weeks. Education is scheduled for 10/11/23 and pt would be able to get the medication delivered shortly after that.    Karen will contact me back and coordinate the Jakafi delivery if needed.     Isi Mao, Pharmacy Technician  Specialty Pharmacy Technician

## 2023-10-09 ENCOUNTER — TELEPHONE (OUTPATIENT)
Dept: ONCOLOGY | Facility: CLINIC | Age: 83
End: 2023-10-09
Payer: MEDICARE

## 2023-10-09 NOTE — TELEPHONE ENCOUNTER
Suzie RN with [facility] states the pts O2 sat is 85% on RA, hr 102 and the patient is pale and sleeping a lot. She is also C/O rib pain. Suzie is asking if the patient should go to the ED. I advised her to send pt to ED if she feels like she needs to go since she is assessing the patient in person. Suzie will send to to [facility] ED. Suzie V/U

## 2023-10-10 ENCOUNTER — DOCUMENTATION (OUTPATIENT)
Dept: PHARMACY | Facility: HOSPITAL | Age: 83
End: 2023-10-10
Payer: MEDICARE

## 2023-10-10 ENCOUNTER — SPECIALTY PHARMACY (OUTPATIENT)
Dept: PHARMACY | Facility: HOSPITAL | Age: 83
End: 2023-10-10
Payer: MEDICARE

## 2023-10-10 NOTE — PROGRESS NOTES
I spoke with pt about her appt for education tomorrow. She is not comfortable with cell phones so I asked her to have her sister in law to contact me tomorrow and I will obtain a cell phone for the BIOeCONi link.    Pt also has reported she has 18 Jakafi tabs left. This is 9 days and she should be receiving her Ojjaara prior to running out of Jakafi.       Isi Mao, Pharmacy Technician  Specialty Pharmacy Technician

## 2023-10-11 ENCOUNTER — TELEPHONE (OUTPATIENT)
Dept: CARDIOLOGY | Facility: CLINIC | Age: 83
End: 2023-10-11
Payer: MEDICARE

## 2023-10-11 ENCOUNTER — TELEMEDICINE (OUTPATIENT)
Dept: ONCOLOGY | Facility: CLINIC | Age: 83
End: 2023-10-11
Payer: MEDICARE

## 2023-10-11 ENCOUNTER — DOCUMENTATION (OUTPATIENT)
Dept: PHARMACY | Facility: HOSPITAL | Age: 83
End: 2023-10-11
Payer: MEDICARE

## 2023-10-11 ENCOUNTER — SPECIALTY PHARMACY (OUTPATIENT)
Dept: ONCOLOGY | Facility: HOSPITAL | Age: 83
End: 2023-10-11
Payer: MEDICARE

## 2023-10-11 VITALS — WEIGHT: 142 LBS | HEIGHT: 64 IN | BODY MASS INDEX: 24.24 KG/M2

## 2023-10-11 DIAGNOSIS — D75.81 MYELOFIBROSIS: Primary | ICD-10-CM

## 2023-10-11 DIAGNOSIS — D47.1 MYELOPROLIFERATIVE DISORDER: Primary | ICD-10-CM

## 2023-10-11 DIAGNOSIS — D50.9 MICROCYTIC ANEMIA: ICD-10-CM

## 2023-10-11 RX ORDER — PROMETHAZINE HYDROCHLORIDE 25 MG/1
SUPPOSITORY RECTAL
COMMUNITY
Start: 2023-10-07

## 2023-10-11 RX ORDER — SUCRALFATE 1 G/1
1 TABLET ORAL EVERY 12 HOURS SCHEDULED
COMMUNITY
Start: 2023-10-07

## 2023-10-11 NOTE — PROGRESS NOTES
This was an audio and video enabled telemedicine encounter.    TREATMENT  PREPARATION  This was an audio and video enabled telemedicine encounter.    Catherine Boyer  [id number]  1940    Chief Complaint: Treatment preparation and needs assessment    History of present illness:  Catherine Boyer is a 83 y.o. year old female who is here today for treatment preparation and needs assessment.  The patient has been diagnosed with   Encounter Diagnosis   Name Primary?    Myeloproliferative disorder Yes    and is scheduled to begin treatment with:     Oncology History:    Oncology/Hematology History    No history exists.       The current medication list and allergy list were reviewed and reconciled.     Past Medical History, Past Surgical History, Social History, Family History have been reviewed and are without significant changes except as mentioned.    Physical Exam:    There were no vitals filed for this visit.  Vitals:    10/11/23 1225   PainSc: 0-No pain        ECOG score: 0             Physical Exam  Nursing note reviewed.     Exam limited due to telehealth visit      NEEDS ASSESSMENTS    Genetics  The patient's new diagnosis and family history have been reviewed for genetic counseling needs. The patient will not be referred..     Psychosocial and Barriers to care  The patient has completed a PHQ-9 Depression Screening and the Distress Thermometer (DT) today.  PHQ-9 results show PHQ-2 Total Score: 0 PHQ-9 Total Score: PHQ-9 Total Score: 0     The patient scored their distress today as Distress Level: 0-No distress on a scale of 0-10 with 0 being no distress and 10 being extreme distress. Problems marked by the patient as being an issue for them within the last week include Physical concerns  Fatigue: Yes  Sleep: Yes .      Results were reviewed along with psychosocial resources offered by our cancer center.  Our Supportive Oncology team will be flagged for a score of 4 or above, and a same day call will be made  for a score of 9 or 10.  A mental health referral is offered at that time. Patients who score less than 4 have been educated on our support services and can be referred to our  upon request.  The patient will not be referred to our .       Nutrition  The patient has completed the malnutrition screening today. They scored Malnutrition Screening Tool  Have you recently lost weight without trying?  If yes, how much weight have you lost?: 0--> No  Have you been eating poorly because of a decreased appetite?: 0--> No  MST score: 0   with a score of 0-1 meaning not at risk in a score of 2 or greater meaning at risk.  Patients with a score of 3 or higher will be referred to our oncology dietitian for support. Patients beginning at risk treatment regimens or who have dietary concerns will also be referred to our oncology dietitian. The patient will not be referred.    Functional Assessment  Persons who are age 70 or greater will be screened for qualification of a comprehensive geriatric assessment by our survivorship nurse practitioner.  Older adults with cancer face unique challenges. These may include an increased risk of drug reactions, financial burdens, and caregiver stress. The patient scored G8 Questionnaire  Has food intake declined over the past 3 months due to loss of appetite, digestive problems, chewing or swallowing difficulties?: No decrease in food intake  Weight loss during the last 3 months: No weight loss  Mobility: Goes out  Neuropsychological Problems: No psychological problems  Body Mass Index (BMI (weight in kg) / (height in m2)): BMI 23 and > 23  Takes more than 3 medications a day: Yes, takes more than 3 prescription drugs per day  In comparison with other people of the same age, how does the patient consider his/her health status?: As good  Age: 80 to 85  Total Score: 14 . Patients scoring 14 or lower will referred for an older adult functional assessment with the  "survivorship advanced practice registered nurse to ensure all needed support is provided as patients plan for their treatments. The patient will not be referred.    Intravenous Access Assessment  The patient and I discussed planned intravenous chemo/biotherapy as well as other IV treatments that are often needed throughout the course of treatment. These may include, but are not limited to blood transfusions, antibiotics, and IV hydration. Discussed that depending on selected treatment and vein assessment, patient may require venous access device (VAD) which could include but not limited to a Mediport or PICC line. Risks and benefits of VADs reviewed. The patient will be treated via Oral Treatment.    Reproductive/Sexual Activity   People should avoid becoming pregnant and should not get a partner pregnant while undergoing chemo/biotherapy.  People of childbearing age should use effective contraception during active therapy. The best recommendation for all people is to use a barrier method for a minimum of 1 week after the last infusion of chemo/biotherapy to prevent your partner being exposed to byproducts from treatment medications in bodily fluids. Effective contraception should be discussed with your oncology team to make sure it is safe to take based on your diagnosis. Possible options include oral contraceptives, barrier methods. Chemo/biotherapy can change your ability to reproduce children in the future.  There are options for fertility preservation. NOT APPLICABLE    Advanced Care Planning  Advance Care Planning   The patient and I discussed advanced care planning, \"Conversations that Matter\".   This service is offered for development of advance directives with a certified ACP facilitator.  The patient does have an up-to-date advanced directive. This document is on file with our office. The patient is not interested in an appointment with one of our facilitators to create or update their advanced directives. "    Have you reviewed your Advance Directive and is it valid for this stay?: Not applicable          Smoking cessation  Tobacco Use: Medium Risk (10/11/2023)    Patient History     Smoking Tobacco Use: Former     Smokeless Tobacco Use: Never     Passive Exposure: Past       Patient and I discussed their tobacco use history. Referral will not be made for smoking cessation.      Palliative Care  When appropriate, the patient and I discussed the availability palliative care services and when appropriate Hospice care. Palliative care is not the same as Hospice care which was explained to the patient.The patient is not interested in additional information from our  on these services.    Survivorship   When appropriate, we discussed that we will refer the patient to survivorship clinic to discuss next steps following completion of planned treatment.  Reviewed this visit will include assessment of your physical, psychological, functional, and spiritual needs as a survivor and the need at attend this visit when scheduled.      Assessment and Plan:    Diagnoses and all orders for this visit:    1. Myeloproliferative disorder (Primary)      No orders of the defined types were placed in this encounter.        The patient and I have reviewed their diagnosis and scheduled treatment plan. Needs assessment was completed where applicable including genetics, psychosocial needs, barriers to care, VAD evaluation, advanced care planning, survivorship, and palliative care services where indicated. Referrals have been ordered as appropriate based upon evaluation today and patient desires.   Chemo/biotherapy teaching was completed today and consent obtained. See separate documentation for further details.  Adequate time was given to answer questions.  Patient made aware of their care team members and contact information if they have questions or problems throughout the treatment course.  Discussion held and written information  provided describing frequency of office visits and ongoing monitoring throughout the treatment plan.     Reviewed with patient any prescribed medication sent to pharmacy.  Education provided regarding proper storage, safe handling, and proper disposal of unused medication.  Proper handling of body fluids and waste discussed and written information provided.  If appropriate, patient had pretreatment labs drawn today.    Learning assessment completed at initial patient encounter. See separate flowsheet. Chemo/biotherapy education comprehension assessed at today's visit.    I spent 23 minutes caring for Catherine on this date of service. This time includes time spent by me in the following activities: preparing for the visit, obtaining and/or reviewing a separately obtained history, performing a medically appropriate examination and/or evaluation, counseling and educating the patient/family/caregiver, and documenting information in the medical record.     Melanie Siddiqui, APRN   10/11/23    Mode of Visit: Video  Location of patient: home  You have chosen to receive care through a telehealth visit.  Does the patient consent to use a video/audio connection for your medical care today? Yes  The visit included audio and video interaction. Difficulty with video connect.

## 2023-10-11 NOTE — TELEPHONE ENCOUNTER
Called and spoke with Bailey and she verbalizes understanding.    Ting Castañeda Cardiology Triage  10/11/23 15:25 EDT

## 2023-10-11 NOTE — PROGRESS NOTES
Oral Chemotherapy - New Referral    Received a referral from Dr. Reinoso    Treatment Plan:  Ojjaara (momelotinib)  Start date of treatment planned for: As soon as oral specialty medication is available.  Indication: myelofibrosis with anemia  Relevant past treatments: Jakafi  Is the therapy appropriate based on treatment guidelines and FDA labeling?: yes  Therapeutic Goals: Continue treatment until progression or intolerable toxicity  Patient can self-administer oral medications: Yes    Drug-Drug Interactions: The current medication list was reviewed and there are no relevant drug-drug interactions with the specialty medication.  Medication Allergies: The patient has no relevant allergies as it relates to their oral specialty medication  Review of Labs/Dose Adjustments: The patient's most recent labs were reviewed and all are WNL to start treatment at this dose.     A prescription was released to [facility] for   Drug:  Ojjaara (momelotinib)  Strength: 200 mg  Directions: Take one tablet by mouth daily  Quantity: 28  Refills: 3    Pharmacy education  completed today    Name/Credentials: Lidia Frazier PharmD, BCPS    10/11/2023  13:53 EDT

## 2023-10-11 NOTE — PROGRESS NOTES
Specialty Pharmacy Patient Management Program  Oncology Initial Assessment       Catherine Boyer is a 83 y.o. female with myelofibrosis seen by an Oncology provider and enrolled in the Oncology Patient Management program offered by [facility].  An initial outreach was conducted, including assessment of therapy appropriateness and specialty medication education for Ojjaara (momelotinib). The patient was introduced to services offered by [facility], including: regular assessments, refill coordination, mail order delivery options, prior authorization maintenance, and financial assistance programs as applicable. The patient was also provided with contact information for the pharmacy team.     Goal of chemotherapy: disease control    Treatment Medication(s) / Frequency and Dosing    Ojjaara 200mg PO once daily     Number of cycles: until disease progression or unacceptable toxicity    Start date of oral specialty medication: As soon as oral specialty medication is available.    Follow-up Testing to be determined after TBD cycles by MD.     Items for home use: Imodium AD (for diarrhea) and Thermometer     Rx written for: [x] Nausea    [] Pre-Chemo   ondansetron 8 mg by mouth every 8 hours as needed for nausea      Completing Pharmacist: Yara Ornelas, Pharmacy Intern             Date/time: 10/11/2023 12:17 EDT     Relevant Past Medical History, Comorbidities, and Vaccines  Relevant medical history and concomitant health conditions were discussed with the patient. The patient's chart has been reviewed for relevant past medical history and comorbid health conditions and updated as necessary.  Vaccines are coordinated by the patient's oncologist and primary care provider.  Past Medical History:   Diagnosis Date    Atherosclerosis of abdominal aorta 02/05/2023    Atrial fibrillation     Breast cancer     CAD (coronary artery disease)     NSTEMI 2/2022: 90% ostial LAD, 99%  "D1, 70% mid-distal LAD (medical therapy). She received two stents (2.5x18 and 2.5x26mm [place] LFETY) but I don't know which one went to which lesion.    Carotid atherosclerosis     Cholecystitis 11/22/2022    Added automatically from request for surgery [id number]    Chronic diastolic (congestive) heart failure     COVID 10/29/2022    GERD (gastroesophageal reflux disease)     Glaucoma     History of cataract     Hypertension     Microcytic anemia     per Dr. oleg pate office  note 6/30/22-dd    Myeloproliferative disorder     JAK2 positive    Nonbacterial thrombotic endocarditis     6/2021: 4x5mm vegetation on the ventricular surface of the anterior MV, negative blood cultures    Porcelain gallbladder     PUD (peptic ulcer disease)     TIA (transient ischemic attack)     Type 2 diabetes mellitus     Type 2 diabetes mellitus with circulatory disorder, without long-term current use of insulin 07/14/2022    Upper GI bleed      Social History     Socioeconomic History    Marital status:    Tobacco Use    Smoking status: Former     Packs/day: 1.00     Years: 20.00     Additional pack years: 0.00     Total pack years: 20.00     Types: Cigarettes     Passive exposure: Past    Smokeless tobacco: Never    Tobacco comments:     30  years ago   Vaping Use    Vaping Use: Never used   Substance and Sexual Activity    Alcohol use: Yes     Comment: \"rare\"    Drug use: Never    Sexual activity: Defer       Allergies  Known allergies and reactions were discussed with the patient. The patient's chart has been reviewed for allergy information and updated as necessary.   Allergies   Allergen Reactions    Aspirin Unknown - Low Severity    Oxycodone Unknown - Low Severity    Hydroxyurea Other (See Comments)     Her hemoglobin drop and was stop in February 2022 and start taking Jakafi    Diphenhydramine Hcl Anxiety and Unknown (See Comments)     Benadryl IVP        Current Medication List  This medication list has been reviewed with " the patient and evaluated for any interactions or necessary modifications/recommendations, and updated to include all prescription medications, OTC medications, and supplements the patient is currently taking.  This list reflects what is contained in the patient's profile, which has also been marked as reviewed to communicate to other providers it is the most up to date version of the patient's current medication therapy.   Prior to Admission medications    Medication Sig Start Date End Date Taking? Authorizing Provider   acetaminophen (TYLENOL) 500 MG tablet Take 1 tablet by mouth As Needed.    Marya Olivas MD   apixaban (ELIQUIS) 5 MG tablet tablet Take 1 tablet by mouth 2 (Two) Times a Day. 7/25/23   Estrellita Blanco APRN   clopidogrel (PLAVIX) 75 MG tablet TAKE 1 TABLET DAILY 2/14/23   Estrellita Blanco APRN   famotidine (PEPCID) 20 MG tablet Take 1 tablet by mouth Daily. 5/16/23   Kenton Lopez MD   furosemide (LASIX) 40 MG tablet TAKE 1 TABLET DAILY 7/5/23   Albin Barriga MD   HYDROcodone-acetaminophen (NORCO) 5-325 MG per tablet Take 1 tablet by mouth Every 6 (Six) Hours As Needed for Moderate Pain or Severe Pain. 10/2/23   Karan Hou MD   hydrocortisone-bacitracin-zinc oxide-nystatin (MAGIC BARRIER) Apply 1 application topically to the appropriate area as directed 2 (Two) Times a Day. 5/16/23   Kenton Lopez MD   Jakafi 15 MG tablet Take 1 tablet by mouth 2 (Two) Times a Day. 6/19/23   Darrell Reinoso MD   latanoprost (XALATAN) 0.005 % ophthalmic solution Administer 1 drop to both eyes. 3/14/22   Marya Olivas MD   loperamide (IMODIUM) 2 MG capsule Take 1 capsule by mouth 4 (Four) Times a Day As Needed for Diarrhea. 5/12/23   Kenton Meeks MD   metoprolol succinate XL (TOPROL-XL) 25 MG 24 hr tablet Take 0.5 tablets by mouth Daily. 10/2/23   Karan Hou MD   naloxone (NARCAN) 4 MG/0.1ML nasal spray Call 911. Don't prime. Spray in 1 nostril for  overdose. Repeat in 2-3 minutes in other nostril if no or minimal breathing/responsiveness. 10/2/23   Karan Hou MD   ondansetron (Zofran) 4 MG tablet Take 1 tablet by mouth Every 8 (Eight) Hours As Needed for Nausea or Vomiting. 2/28/23   Annika Guzman MD   saccharomyces boulardii (FLORASTOR) 250 MG capsule Take 1 capsule by mouth 2 (Two) Times a Day. 5/12/23   Kenton Meeks MD   sertraline (ZOLOFT) 100 MG tablet Take 1 tablet by mouth Daily. 4/5/22   ProviderMarya MD   simethicone (MYLICON) 80 MG chewable tablet Chew 1 tablet by mouth 4 (Four) Times a Day As Needed for Flatulence. 2/11/23   Anthony Muñoz MD       Drug Interactions  Reviewed concomitant medications, allergies, labs, comorbidities/medical history, quality of life, and immunization history.   Drug-drug interactions noted and discussed during education: no significant drug interactions noted. . Reminded the patient to let us know before making any changes or starting any new prescription or OTC medications so we can first assess drug interactions.  Drug-food interactions noted and discussed during education: Patient was instructed to avoid  none    Recommended Medications Assessment  Bone Health (such as calcium/vitamin D, bisphosphonate, RANKL inhibitor) - Not Indicated   VTE prophylaxis - Not Indicated   Prophylactic antimicrobials - Not Indicated  Tumor lysis syndrome prophylaxis - Risk for TLS Low.     Relevant Laboratory Values  Lab Results   Component Value Date    GLUCOSE 99 10/02/2023    CALCIUM 9.4 10/02/2023     (L) 10/02/2023    K 4.7 10/02/2023    CO2 22.6 10/02/2023    CL 96 (L) 10/02/2023    BUN 31 (H) 10/02/2023    CREATININE 0.97 10/02/2023    EGFRIFAFRI 81 07/09/2021    EGFRIFNONA 71 07/09/2021    BCR 32.0 (H) 10/02/2023    ANIONGAP 16.4 (H) 10/02/2023     Lab Results   Component Value Date    WBC 34.62 (C) 10/02/2023    RBC 3.20 (L) 10/02/2023    HGB 9.7 (L) 10/02/2023    HCT 29.9 (L)  10/02/2023    MCV 93.4 10/02/2023    MCH 30.3 10/02/2023    MCHC 32.4 10/02/2023    RDW 17.5 (H) 10/02/2023    RDWSD 59.2 (H) 10/02/2023    MPV 11.0 10/02/2023     10/02/2023    NEUTRORELPCT 59.8 09/19/2023    LYMPHORELPCT 9.9 (L) 09/19/2023    MONORELPCT 16.7 (H) 09/19/2023    EOSRELPCT 0.9 09/19/2023    BASORELPCT 0.8 09/19/2023    AUTOIGPER 11.9 (H) 09/19/2023    NEUTROABS 22.85 (H) 10/02/2023    LYMPHSABS 5.22 (H) 09/19/2023    MONOSABS 8.83 (H) 09/19/2023    EOSABS 0.35 10/02/2023    BASOSABS 0.95 (H) 10/01/2023    AUTOIGNUM 6.32 (H) 09/19/2023    NRBC 0.2 09/19/2023       The above labs have been reviewed. No dose adjustments are needed for the oral specialty medication(s) based on the labs.    Initial Education Provided for Specialty Medication  The patient has been provided with the following education. All questions and concerns have been addressed prior to the patient receiving the medication, and the patient has verbalized understanding of the education and any materials provided.  Additional patient education shall be provided and documented upon request by the patient, provider or payer.      Provided patient with:   Education sheets about the medication, 24-hour clinic phone number and my contact information and instructions to call should additional questions arise.     Medication Education Sheets Provided: (select all that apply)  Oral Specialty Medication: Ojjaara    TOPICS COMMENTS   Storage and Handling of Oral Specialty Medication Store in the original container, in a dry location out of direct sunlight, and out of reach of children or pets. and Store at room temperature.  Discussed safe handling and what to do with any unused medication.   Administration of Oral Specialty Medication Take with or without food at the same time(s) each day. and Do not crush or chew tablets.   Adherence to Oral Specialty Regimen and Handling Missed Doses Patient is likely to have good treatment adherence;  reinforced the importance of adherence. Reviewed how to address missed doses and to let us know of any missed doses.   Anemia: role of RBC, cause, s/s, ways to manage, role of transfusion Reviewed the role of RBC and the use of transfusions if hemoglobin decreases too much.  Patient to notify us if they experience shortness of breath, dizziness, or palpitations.  Also let patient know they could feel more tired than usual and to try to stay active, but rest if they need to.    Thrombocytopenia: role of platelet, cause, s/s, ways to prevent bleeding, things to avoid, when to seek help Reviewed the role of platelets in blood clotting and when to call clinic (bloody nose that bleeds for 5 mins despite pressure, a cut that won't stop bleeding despite pressure, gums that bleed excessively with brushing or flossing). Recommended using an electric razor, soft bristle toothbrush, and blowing your nose gently.    Diarrhea: causes, s/s of dehydration, ways to manage, dietary changes, when to call MD Chemotherapy : Discussed the risk of diarrhea. Instructed patient on use OTC loperamide with diarrhea onset, but emphasized the importance of calling the clinic if 4-6 episodes in 24 hours not relieved by OTC loperamide.   Nausea/Vomiting: cause, use of antiemetics, dietary changes, when to call MD Emetic risk: Low-Minimal  PRN home meds: Ondansetron    Instructed the patient to take a dose of the PRN medication at the first onset of nausea and if it's not working to call us for additional medications.  Also provided non-drug measures to mitigate nausea.   Organ Toxicities: cause, s/s, need for diagnostic tests, labs, when to notify MD Discussed potential effects on organ systems, monitoring, diagnostic tests, labs, and when to notify their MD. Discussed the signs/symptoms of the following: hepatotoxicity   Infertility and Sexuality:  causes, fertility preservation options, sexuality changes, ways to manage, importance of birth  control The patient is not of childbearing potential.   Home Care: how to manage bodily fluids Counseled on management of soiled linens and proper flush technique.  Discussed how to manage all the side effects at home and advised when to contact the MD office   Survivorship: distress, distress assessment, secondary malignancies, early/late effects, follow-up, social issues, social support Discussed the rare, but possible risk of secondary malignancies months to years after treatment, most commonly non-melanoma skin cancer.     Adherence and Self-Administration  Barriers to Patient Adherence and/or Self-Administration: none  Expected duration of therapy: Until disease progression or intolerable toxicity    Goals of Therapy  Patient Goals of Therapy:   Consistently take medications as prescribed  Patient will adhere to medication regimen  Patient will report any medication side effects to healthcare provider  Clinical Goals:    Goals Addressed Today    None       Support patient understanding of medication regimen  Ensure patient knows the pharmacy contact information  Schedule regular follow-up to monitor the treatment serious adverse events  Schedule regular follow-up to confirm medication adherence  Schedule regular follow-up to monitor disease progression or stability    Quality of Life Assessment   The patient's current (pre-therapy) quality of life was discussed with the patient. The QOL segment of this outreach has been reviewed and updated.   Quality of Life Score: 6/10    Wrap up  Discussed aforementioned material with patient via telemedicine.   Chemo consents/CCA will need to be signed at next visit to office on 10/18.   Medication availability: patient is aware [id number] pharmacy will be contacting them to arrange delivery  Patient expressed understanding.   Patient demonstrates ability to self-administer medication. No barriers to adherence identified.  All questions and concerns addressed.     Reassessment  Plan & Follow-Up  Pharmacist to perform regular reassessments no more than (6) months from the previous assessment.  Welcome information and patient satisfaction survey to be sent by retail team with patient's initial fill.  Care Coordinator to set up future refill outreaches, coordinate prescription delivery, and escalate clinical questions to pharmacist.      Attestation  I attest that the patient was actively involved in and has agreed to the above plan of care.  If the prescribed therapy is at any point deemed not appropriate based on the current or future assessments, a consultation will be initiated with the patient's specialty care provider to determine the best course of action. The revised plan of therapy will be documented along with any additional patient education provided.     Yara Ornelas, Pharmacy Student  Lidia Frazier, ViktoriaD, BCPS  10/11/2023  12:17 EDT

## 2023-10-11 NOTE — PROGRESS NOTES
Pt was educated on Ojjaara today by [facility] Pharmacist. Rx sent to [id number]. I will follow for prompt delivery. As of yesterday-pt had 9 days of Jakafi on hand.    SpRx Enrollment updated to external filling.     Isi Mao, Pharmacy Technician  Specialty Pharmacy Technician

## 2023-10-11 NOTE — TELEPHONE ENCOUNTER
Bailey from [facility] is calling to make us aware of some mildly elevated HR's.  She understands she has follow up with us coming up 10/18.     10/11     /61  10/9       /61  10/6    HR 87     /68  10/4    HR 89     /71  9/27    HR 90     /72    She reports patient is actually doing well, nurse reports she has seen her up and around in the hallways which is a positive sign for the patient.  She was sick over the weekend with some vomiting, which has resolved.  She does not have palpitations, CP, SOA.      She was hospitalized 9/27-10/2 at [facility] and has follow up with TK 10/18.  Metoprolol XL was decreased to 12.5 mg due to borderline hypotension per DC summary.  She is on eliquis.    I did explain to  nurse Bailey that the patient does have hx of paroxysmal afib and is anticoagulated.  Has upcoming appt 10/18 with Estrellita.  I let her know I will let Estrellita and Dr. Barriga know of the HRs and if they want to do anything differently before scheduled follow up 10/18.    Ting Diaz RN  [facility] Triage  10/11/23 13:46 EDT    Bailey callback # [phone]

## 2023-10-12 NOTE — PROGRESS NOTES
Drug:  Ojjaara (momelotinib)  Strength: 200 mg  Directions: Take one tablet by mouth daily  Quantity: 28  Refills: 3    Released to pharmacy: [id number]    Completed independent double check on medication order/RX.  Gary Reyna, PharmD, BCOP

## 2023-10-13 ENCOUNTER — SPECIALTY PHARMACY (OUTPATIENT)
Dept: PHARMACY | Facility: HOSPITAL | Age: 83
End: 2023-10-13
Payer: MEDICARE

## 2023-10-13 ENCOUNTER — DOCUMENTATION (OUTPATIENT)
Dept: PHARMACY | Facility: HOSPITAL | Age: 83
End: 2023-10-13
Payer: MEDICARE

## 2023-10-13 NOTE — PROGRESS NOTES
Per Stan at [id number] Ojjaara is ready to schedule. Pt's copay is $0 with her [facility].     I contacted Karen and provided her the phone number to call [id number] to coordinated the delivery.       Isi Mao, Pharmacy Technician  Specialty Pharmacy Technician

## 2023-10-13 NOTE — PROGRESS NOTES
Ojjaara supply from [id number] to be delivered to pt on 10/16/2023.     Isi Mao, Pharmacy Technician  Specialty Pharmacy Technician

## 2023-10-17 NOTE — PROGRESS NOTES
"[facility] GROUP OUTPATIENT FOLLOW UP CLINIC VISIT    REASON FOR FOLLOW-UP:    Myelofibrosis, on Jakafi    HISTORY OF PRESENT ILLNESS:  Catherine Boyer is a 83 y.o. female with the above-mentioned she returns today for follow-up.    She took her last dose of Jakafi today and tomorrow will start momelotinib (Ojjaara). She is feeling well at thi point following her hospitalization.     REVIEW OF SYSTEMS:  As per HPI    PHYSICAL EXAMINATION:    Vitals:    10/18/23 1601   BP: 151/73   Pulse: 114   Resp: 18   Temp: 98.2 °F (36.8 °C)   TempSrc: Temporal   SpO2: 96%   Weight: 67.7 kg (149 lb 4.8 oz)   Height: 162 cm (63.78\")   PainSc: 0-No pain       General:  No acute distress, awake, alert and oriented.  Ambulatory with a rolling walker.  Skin: Left lower extremity bandage in place again today  HEENT:  Normocephalic/atraumatic.  Chest:  Normal respiratory effort.  Lungs clear to auscultation bilaterally.  Heart: Tachycardic and regular with some ectopy  Extremities:  No visible clubbing, cyanosis, or edema  Neuro/psych:  Grossly nonfocal.  Normal mood and affect.      DIAGNOSTIC DATA:  Retic With IRF & RET-He (10/18/2023 15:45)  CBC & Differential (10/18/2023 15:45)    IMAGING:  None reviewed    ASSESSMENT:  This is a 83 y.o. female with:    *JAK2 positive myeloproliferative disorder, perhaps primary myelofibrosis  She has been seen by Dr. Gerard Foreman with [facility] specialists and cancer and blood disorders in [place].      She was seen there initially on 6/4/2021 with leukocytosis with a white blood cell count of 104,000.  Hemoglobin was normal at 11.6 and platelets were normal at 299,000.  PCR for BCR/ABL and FISH for BCR/ABL were negative.  A bone marrow aspiration was attempted on 6/9/2021 which was unsuccessful.  She then went to [place] for the summer.  She was admitted to the [facility] between 6/29/2021 and 7/9/2021.  She had a bone marrow aspiration and biopsy performed there on 6/30/2021 " showing a hypercellular marrow at greater than 95% with 1% blasts.  Focal 1+ reticulin fibrosis was noted.  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  PCR for BCR/ABL was negative.  JAK2 V617F PCR was positive.  Cytogenetics were normal.  Next generation myeloid disorder profiling of the bone marrow aspirate from 6/30/2021 showed TET2 Q3981N and [id number] abnormalities and JAK2 V617F.  Therefore she was suspected to have early primary myelofibrosis and she was started on hydroxyurea 1000 mg daily.  Subsequently, hydroxyurea was held.  Blood counts had improved.  She was admitted to the hospital from 2/9 through 2/15/2022 for symptomatic anemia and chest pain.  Her hemoglobin was 5.5.  The white blood cell count was 7.6.  Platelets were 56,000.  Fecal occult blood testing was negative.  She received 3 units of packed red blood cells.  No endoscopy was performed.  A left heart catheterization was performed with a drug-eluting stent placed to the second diagonal on 2/11/2022 and she was discharged from that hospitalization on aspirin 81 mg, Plavix 75 mg, and Eliquis 5 mg twice daily.  Blood counts improved by 3/7/2022 and her white blood cell count was 7.2 with a hemoglobin 11.4 and platelets 295,000.  She did have a bone marrow aspiration and biopsy on 3/7/2022 showing chronic myeloproliferative neoplasm with potentially post ET fibrosis or consideration of primary myelofibrosis.  There was no evidence for myelodysplasia.  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  She also has atrial fibrillation and is anticoagulated with Eliquis.    Hydroxyurea was discontinued and she was started on ruxolitinib 15 mg twice daily at her visit on 3/14/2022 with Dr. Gerard Foreman.  She had CT imaging of the chest abdomen pelvis on 3/19/2022 that did not demonstrate any splenomegaly.  Calcified mediastinal lymphadenopathy was noted.   Borderline pretracheal and right hilar lymphadenopathy noted.  Small bilateral pleural effusions with bibasilar atelectasis noted.  She has moved from [place] to [place] and is seen initially in the office on 5/15/2022.  She tolerates ruxolitinib well.  White blood cell count has decreased from 5.9 from 19.6.  Platelets have normalized at 170,000, down from 464,000.  The hemoglobin has also decreased at 8.6 with an MCV of 98.9.  5/26/2022: White blood cell count mildly elevated at 12.4 with a normal platelet count at 216,000.  She tolerates Jakafi very well.   6/30/2022: White blood cell count a little higher.  Continue monitoring.  9/13/2022: White blood cell count higher at 32,000  Flow cytometry 9/13/2022 with 0.1% myeloblasts with a typical (normal) phenotype.  Nonspecific monocyte population.  Bone marrow aspiration and biopsy on 10/7/2022 with hypercellular marrow at 95% with involvement by chronic myeloproliferative neoplasm, less than 5% blasts.  Mild reticulin fibrosis.  Decreased iron stores.  Consideration of CMML.  Flow cytometry showed a dim CD56 positive aberrant monocyte population at 8.2 percent of events.  Cytogenetics pending.  NGS pending.  10/26/2022: White blood cell count improved at 21,000.  Patient admitted 10/29/2022 with COVID-19 infection.  Jakafi held.  Resumed Jakafi 15 mg twice daily on 11/1/2022.  WBC stable at 51,000  On 2/17/2023 a CT scan of the abdomen and pelvis showed improved splenomegaly at 14.5 cm, previously 16 cm  4/10/2023: White blood cell count reasonably stable at 66,000  6/26/2023: White blood cell count stable at 42,000  9/19/2023: White blood cell count stable at 52.98  10/18/23: she took her last dose of Jakafi this morning and will start momelotinib (Ojjaara) 200 mg daily on 10/19    *Microcytic and now normocytic anemia  Hemoglobin improved at 10.9, from 9.7     *History of marantic endocarditis and atrial fibrillation  Anticoagulated with Eliquis 5 mg twice  daily  Hold this due to post-Mohs bleeding     *Recent admission with heart failure, positive stress test and LEFTY to a large diagonal branch for in sent stenosis.    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  She does not wish to pursue further evaluation.  She did have some brief epigastric/right upper quadrant pain which has now resolved.  Cholecystectomy by Dr. Dani Grover Jr. on 11/28/2022    *COVID-19 infection  Patient presented to ER on 10/29/2022 with progressive generalized weakness x10 days  Patient tested positive for COVID-19 on admission 10/29/2022  Elevated lactate 3.0  Chest x-ray 10/29/2022 with no evidence of consolidation  Patient was started on dexamethasone and remdesivir  Improved and recovered    *GI bleeding February 2023  Presented with abdominal pain, melena.  Hemoglobin 6.1 at presentation.  EGD on 1/25/2023 had noted mild mucosal changes characterized by slowing in the lower third of the esophagus.  Patchy mild inflammation characterized by congestion and erythema found in the gastric body.  The examined duodenum was normal.  Multiple biopsies taken.  Suspected bleeding from prior biopsy site  Anticoagulation with Eliquis and Plavix was held.  On PPI  GI follow-up- EGD/push enteroscopy and colonoscopy when Eliquis held 5 days.    Procedures occurred on 2/9/2023.  Nonbleeding internal hemorrhoids.  No source of bleeding identified.    *Elevated transaminases  She has been seen by gastroenterology  Last AST 77 and ALT 96 with an alkaline phosphatase of 507  Anti-smooth muscle antibodies and mitochondrial antibodies normal  Transaminases remain normal    *Skin lesions  She saw Dr. Fier[facility] at [facility] with biopsies of both locations showing squamous cell carcinoma  Mohs procedure performed on her leg on  8/4    PLAN:     Tomorrow she will start momelotinib (Ojjaara) 200 mg daily  Continue Plavix 75 mg daily.    Eliquis continues  Management of CHF and tachycardia per cardiology  Follow-up in about 5 weeks with labs. I encouraged her to call with any issues that arise prior to that.     High risk medication requiring intensive monitoring.     Darrell Reinoso MD  10/18/2023

## 2023-10-18 ENCOUNTER — OFFICE VISIT (OUTPATIENT)
Dept: ONCOLOGY | Facility: CLINIC | Age: 83
End: 2023-10-18
Payer: MEDICARE

## 2023-10-18 ENCOUNTER — OFFICE VISIT (OUTPATIENT)
Dept: CARDIOLOGY | Facility: CLINIC | Age: 83
End: 2023-10-18
Payer: MEDICARE

## 2023-10-18 ENCOUNTER — LAB (OUTPATIENT)
Dept: LAB | Facility: HOSPITAL | Age: 83
End: 2023-10-18
Payer: MEDICARE

## 2023-10-18 ENCOUNTER — DOCUMENTATION (OUTPATIENT)
Dept: PHARMACY | Facility: HOSPITAL | Age: 83
End: 2023-10-18
Payer: MEDICARE

## 2023-10-18 ENCOUNTER — APPOINTMENT (OUTPATIENT)
Dept: ONCOLOGY | Facility: HOSPITAL | Age: 83
End: 2023-10-18
Payer: MEDICARE

## 2023-10-18 VITALS
HEIGHT: 64 IN | DIASTOLIC BLOOD PRESSURE: 78 MMHG | WEIGHT: 145.8 LBS | SYSTOLIC BLOOD PRESSURE: 128 MMHG | BODY MASS INDEX: 24.89 KG/M2 | HEART RATE: 121 BPM | OXYGEN SATURATION: 98 %

## 2023-10-18 VITALS
RESPIRATION RATE: 18 BRPM | DIASTOLIC BLOOD PRESSURE: 73 MMHG | WEIGHT: 149.3 LBS | TEMPERATURE: 98.2 F | HEIGHT: 64 IN | HEART RATE: 114 BPM | BODY MASS INDEX: 25.49 KG/M2 | SYSTOLIC BLOOD PRESSURE: 151 MMHG | OXYGEN SATURATION: 96 %

## 2023-10-18 DIAGNOSIS — I50.33 ACUTE ON CHRONIC DIASTOLIC HEART FAILURE: Primary | ICD-10-CM

## 2023-10-18 DIAGNOSIS — I38 NONBACTERIAL THROMBOTIC ENDOCARDITIS: ICD-10-CM

## 2023-10-18 DIAGNOSIS — D47.1 MYELOPROLIFERATIVE DISORDER: ICD-10-CM

## 2023-10-18 DIAGNOSIS — D75.81 MYELOFIBROSIS: Primary | ICD-10-CM

## 2023-10-18 DIAGNOSIS — I48.0 PAROXYSMAL ATRIAL FIBRILLATION: ICD-10-CM

## 2023-10-18 DIAGNOSIS — I25.10 CORONARY ARTERY DISEASE INVOLVING NATIVE CORONARY ARTERY OF NATIVE HEART WITHOUT ANGINA PECTORIS: ICD-10-CM

## 2023-10-18 DIAGNOSIS — D64.9 NORMOCYTIC ANEMIA: ICD-10-CM

## 2023-10-18 DIAGNOSIS — I10 PRIMARY HYPERTENSION: ICD-10-CM

## 2023-10-18 DIAGNOSIS — I27.20 PULMONARY HYPERTENSION: ICD-10-CM

## 2023-10-18 DIAGNOSIS — R00.0 TACHYCARDIA: ICD-10-CM

## 2023-10-18 PROBLEM — R06.00 DYSPNEA: Status: RESOLVED | Noted: 2023-09-27 | Resolved: 2023-10-18

## 2023-10-18 PROBLEM — R11.2 INTRACTABLE NAUSEA AND VOMITING: Status: RESOLVED | Noted: 2023-08-13 | Resolved: 2023-10-18

## 2023-10-18 PROBLEM — N17.9 AKI (ACUTE KIDNEY INJURY): Status: RESOLVED | Noted: 2023-08-13 | Resolved: 2023-10-18

## 2023-10-18 PROBLEM — K29.70 GASTRITIS: Status: RESOLVED | Noted: 2023-01-26 | Resolved: 2023-10-18

## 2023-10-18 LAB
ALBUMIN SERPL-MCNC: 4.2 G/DL (ref 3.5–5.2)
ALBUMIN/GLOB SERPL: 2 G/DL
ALP SERPL-CCNC: 129 U/L (ref 39–117)
ALT SERPL W P-5'-P-CCNC: 33 U/L (ref 1–33)
ANION GAP SERPL CALCULATED.3IONS-SCNC: 24.1 MMOL/L (ref 5–15)
AST SERPL-CCNC: 33 U/L (ref 1–32)
BASOPHILS # BLD AUTO: 0.69 10*3/MM3 (ref 0–0.2)
BASOPHILS NFR BLD AUTO: 1.2 % (ref 0–1.5)
BILIRUB SERPL-MCNC: 0.4 MG/DL (ref 0–1.2)
BUN SERPL-MCNC: 15 MG/DL (ref 8–23)
BUN/CREAT SERPL: 19 (ref 7–25)
CALCIUM SPEC-SCNC: 9.1 MG/DL (ref 8.6–10.5)
CHLORIDE SERPL-SCNC: 96 MMOL/L (ref 98–107)
CO2 SERPL-SCNC: 17.9 MMOL/L (ref 22–29)
CREAT SERPL-MCNC: 0.79 MG/DL (ref 0.6–1.1)
DEPRECATED RDW RBC AUTO: 64.8 FL (ref 37–54)
EGFRCR SERPLBLD CKD-EPI 2021: 73.4 ML/MIN/1.73
EOSINOPHIL # BLD AUTO: 0.47 10*3/MM3 (ref 0–0.4)
EOSINOPHIL NFR BLD AUTO: 0.8 % (ref 0.3–6.2)
ERYTHROCYTE [DISTWIDTH] IN BLOOD BY AUTOMATED COUNT: 18.3 % (ref 12.3–15.4)
GLOBULIN UR ELPH-MCNC: 2.1 GM/DL
GLUCOSE SERPL-MCNC: 132 MG/DL (ref 65–99)
HCT VFR BLD AUTO: 34.4 % (ref 34–46.6)
HGB BLD-MCNC: 10.9 G/DL (ref 12–15.9)
HGB RETIC QN AUTO: 36 PG (ref 29.8–36.1)
IMM GRANULOCYTES # BLD AUTO: 5.54 10*3/MM3 (ref 0–0.05)
IMM GRANULOCYTES NFR BLD AUTO: 9.3 % (ref 0–0.5)
IMM RETICS NFR: 27.6 % (ref 3–15.8)
LYMPHOCYTES # BLD AUTO: 3.39 10*3/MM3 (ref 0.7–3.1)
LYMPHOCYTES NFR BLD AUTO: 5.7 % (ref 19.6–45.3)
MCH RBC QN AUTO: 30.7 PG (ref 26.6–33)
MCHC RBC AUTO-ENTMCNC: 31.7 G/DL (ref 31.5–35.7)
MCV RBC AUTO: 96.9 FL (ref 79–97)
MONOCYTES # BLD AUTO: 6.47 10*3/MM3 (ref 0.1–0.9)
MONOCYTES NFR BLD AUTO: 10.9 % (ref 5–12)
NEUTROPHILS NFR BLD AUTO: 42.87 10*3/MM3 (ref 1.7–7)
NEUTROPHILS NFR BLD AUTO: 72.1 % (ref 42.7–76)
NRBC BLD AUTO-RTO: 0 /100 WBC (ref 0–0.2)
PLATELET # BLD AUTO: 385 10*3/MM3 (ref 140–450)
PMV BLD AUTO: 11 FL (ref 6–12)
POTASSIUM SERPL-SCNC: 4.1 MMOL/L (ref 3.5–5.2)
PROT SERPL-MCNC: 6.3 G/DL (ref 6–8.5)
RBC # BLD AUTO: 3.55 10*6/MM3 (ref 3.77–5.28)
RETICS # AUTO: 0.06 10*6/MM3 (ref 0.02–0.13)
RETICS/RBC NFR AUTO: 1.59 % (ref 0.7–1.9)
SODIUM SERPL-SCNC: 138 MMOL/L (ref 136–145)
WBC NRBC COR # BLD: 59.43 10*3/MM3 (ref 3.4–10.8)

## 2023-10-18 PROCEDURE — 85025 COMPLETE CBC W/AUTO DIFF WBC: CPT

## 2023-10-18 PROCEDURE — 36415 COLL VENOUS BLD VENIPUNCTURE: CPT

## 2023-10-18 PROCEDURE — 85046 RETICYTE/HGB CONCENTRATE: CPT

## 2023-10-18 PROCEDURE — 80053 COMPREHEN METABOLIC PANEL: CPT

## 2023-10-18 RX ORDER — METOPROLOL SUCCINATE 25 MG/1
25 TABLET, EXTENDED RELEASE ORAL DAILY
Qty: 90 TABLET | Refills: 2 | Status: SHIPPED | OUTPATIENT
Start: 2023-10-18

## 2023-10-18 NOTE — PROGRESS NOTES
Ojjaara supply from [id number] delivered to pt on 10/16/2023 at 10:25 am.    [employer] tracking # [id number]     Isi Mao, Pharmacy Technician  Specialty Pharmacy Technician

## 2023-10-18 NOTE — PROGRESS NOTES
Date of Office Visit: 10/18/2023  Encounter Provider: ABEL Ferrell  Place of Service: [facility] CARDIOLOGY  Patient Name: Catherine Boyer  :1940  Primary Cardiologist: Dr. Talamantes    Chief Complaint   Patient presents with    Congestive Heart Failure   :     HPI: Catherine Boyer is a 83 y.o. female who presents today for evaluation of heart rate and blood pressure. I have reviewed her medical records.    She has been diagnosed with type II diabetes, hypertension, and mild carotid disease. She also has a history of right-sided breast cancer and underwent lumpectomy and radiation.  She did not require chemotherapy or hormonal therapy.     In 2021, while in [place] she was diagnosed with atrial fibrillation, nonbacterial endocarditis, and JAK2+ myeloproliferative disorder.     In 2022, she presented to the ED with heartburn and chest pain.  She was markedly anemic and hemoglobin was 5.5 requiring PRBC transfusion.  She was diagnosed with a non-STEMI. Cardiac catheterization revealed EF 45-50%, stenosis of the diagonal and a long lesion in the mid to distal LAD.  She underwent drug-eluting stent placement to the diagonal and mid LAD was recommended medical treatment.     In 2022, she presented to the [facility] ED with chest pain and shortness of breath.  She was felt to have acute on chronic diastolic heart failure with hypoxia. Stress testing was abnormal and she underwent drug-eluting stent placement to a large diagonal branch for in-stent restenosis.  She was noted to have  of mid RCA and nonobstructive disease of the LAD.  She underwent IV diuresis.     From 2022 to 2023, she was hospitalized 7 times for GI symptoms.  She was diagnosed with upper GI bleed and apixaban was on hold for a while.  Clopidogrel and apixaban were resumed and she denied any further bleeding.    In May 2023, echocardiogram showed normal EF, moderate left  atrial dilation, aortic valve sclerosis without stenosis, and severe pulmonary hypertension (RVSP 65 mmHg).    In September 2023, she presented to the [facility] ED with dyspnea.  Hemoglobin was 8.0 and O2 sat 89%.  She had a blood transfusion.  She was eating Chinese food a few days before and was felt to have a diastolic heart failure exacerbation.  She was given IV diuresis with improvement and discharged on furosemide 40 mg daily.  She was hypotensive and metoprolol was decreased to 12.5 mg daily and lisinopril was discontinued.    Home health nurse called the other day stating the patient blood pressures have been ranging 105//72 with heart rates between  bpm.  Patient was feeling well.  Dr. Barriga reviewed and felt that her blood pressure and heart rates were at goal for her.     She presents today for follow-up visit with her daughter accompanying her.  Her blood pressure is normal, but heart rate elevated today (121 bpm).  She is asymptomatic.  She says she is feeling really well.  She denies shortness of breath, chest pain, PND, orthopnea, edema, palpitations, dizziness, syncope, or bleeding.  She is headed to the hematology office after today's appointment.      Past Medical History:   Diagnosis Date    Atherosclerosis of abdominal aorta 02/05/2023    Atrial fibrillation     Breast cancer     CAD (coronary artery disease)     NSTEMI 2/2022: 90% ostial LAD, 99% D1, 70% mid-distal LAD (medical therapy). She received two stents (2.5x18 and 2.5x26mm Finesse LEFTY) but I don't know which one went to which lesion.    Carotid atherosclerosis     Cholecystitis 11/22/2022    Added automatically from request for surgery [id number]    Chronic diastolic (congestive) heart failure     COVID 10/29/2022    GERD (gastroesophageal reflux disease)     Glaucoma     History of cataract     Hypertension     Microcytic anemia     per Dr. oleg pate office  note 6/30/22-dd    Myeloproliferative disorder     JAK2 positive     Nonbacterial thrombotic endocarditis     6/2021: 4x5mm vegetation on the ventricular surface of the anterior MV, negative blood cultures    Porcelain gallbladder     PUD (peptic ulcer disease)     TIA (transient ischemic attack)     Type 2 diabetes mellitus     Type 2 diabetes mellitus with circulatory disorder, without long-term current use of insulin 07/14/2022    Upper GI bleed        Past Surgical History:   Procedure Laterality Date    BREAST LUMPECTOMY  1999    CARDIAC CATHETERIZATION N/A 09/02/2022    Procedure: Coronary angiography;  Surgeon: Guero Verde MD;  Location: [facility] CATH INVASIVE LOCATION;  Service: Cardiovascular;  Laterality: N/A;    CARDIAC CATHETERIZATION N/A 09/02/2022    Procedure: Stent LEFTY coronary;  Surgeon: Guero Verde MD;  Location: [facility] CATH INVASIVE LOCATION;  Service: Cardiovascular;  Laterality: N/A;    CATARACT EXTRACTION  2011    CHOLECYSTECTOMY      CHOLECYSTECTOMY WITH INTRAOPERATIVE CHOLANGIOGRAM N/A 11/28/2022    Procedure: CHOLECYSTECTOMY LAPAROSCOPIC INTRAOPERATIVE CHOLANGIOGRAM;  Surgeon: Dani Grover Jr., MD;  Location: [facility] MAIN OR;  Service: General;  Laterality: N/A;    COLONOSCOPY N/A 02/09/2023    Procedure: COLONOSCOPY TO CECUM & T.I.;  Surgeon: Guero Ferris MD;  Location: [facility] ENDOSCOPY;  Service: Gastroenterology;  Laterality: N/A;  PRE- MELENA, GI BLEED  POST- DIVERTICULOSIS, INT HEMORRHOIDS    ENDOSCOPY N/A 01/25/2023    Procedure: ESOPHAGOGASTRODUODENOSCOPY WITH BIOPSIES;  Surgeon: Chrales Diaz MD;  Location: [facility] ENDOSCOPY;  Service: Gastroenterology;  Laterality: N/A;  pre: abd pain, nausea  post: hiatal hernia, mild gastritis, sloughing of the esophagus    ENTEROSCOPY SMALL BOWEL N/A 02/09/2023    Procedure: ENTEROSCOPY SMALL BOWEL;  Surgeon: Guero Ferris MD;  Location: [facility] ENDOSCOPY;  Service: Gastroenterology;  Laterality: N/A;  PRE- MELENA, GI BLEED  POST- HIATAL HERNIA    JOINT REPLACEMENT      UPPER GASTROINTESTINAL  "ENDOSCOPY         Social History     Socioeconomic History    Marital status:    Tobacco Use    Smoking status: Former     Packs/day: 1.00     Years: 20.00     Additional pack years: 0.00     Total pack years: 20.00     Types: Cigarettes     Passive exposure: Past    Smokeless tobacco: Never    Tobacco comments:     30  years ago   Vaping Use    Vaping Use: Never used   Substance and Sexual Activity    Alcohol use: Yes     Comment: \"rare\"    Drug use: Never    Sexual activity: Defer       Family History   Problem Relation Age of Onset    Ovarian cancer Mother     Lung cancer Father     Lung cancer Sister     Malig Hyperthermia Neg Hx        The following portion of the patient's history were reviewed and updated as appropriate: past medical history, past surgical history, past social history, past family history, allergies, current medications, and problem list.    Review of Systems   Constitutional: Negative.   Cardiovascular: Negative.    Respiratory: Negative.     Hematologic/Lymphatic: Negative.    Neurological: Negative.        Allergies   Allergen Reactions    Aspirin Unknown - Low Severity    Oxycodone Unknown - Low Severity    Hydroxyurea Other (See Comments)     Her hemoglobin drop and was stop in February 2022 and start taking Jakafi    Diphenhydramine Hcl Anxiety and Unknown (See Comments)     Benadryl IVP         Current Outpatient Medications:     acetaminophen (TYLENOL) 500 MG tablet, Take 1 tablet by mouth As Needed., Disp: , Rfl:     apixaban (ELIQUIS) 5 MG tablet tablet, Take 1 tablet by mouth 2 (Two) Times a Day., Disp: 180 tablet, Rfl: 3    clopidogrel (PLAVIX) 75 MG tablet, TAKE 1 TABLET DAILY, Disp: 30 tablet, Rfl: 11    famotidine (PEPCID) 20 MG tablet, Take 1 tablet by mouth Daily., Disp: , Rfl:     furosemide (LASIX) 40 MG tablet, TAKE 1 TABLET DAILY, Disp: 90 tablet, Rfl: 3    hydrocortisone-bacitracin-zinc oxide-nystatin (MAGIC BARRIER), Apply 1 application topically to the " "appropriate area as directed 2 (Two) Times a Day., Disp: , Rfl:     Jakafi 15 MG tablet, Take 1 tablet by mouth 2 (Two) Times a Day., Disp: 60 tablet, Rfl: 3    latanoprost (XALATAN) 0.005 % ophthalmic solution, Administer 1 drop to both eyes., Disp: , Rfl:     loperamide (IMODIUM) 2 MG capsule, Take 1 capsule by mouth 4 (Four) Times a Day As Needed for Diarrhea., Disp: 120 capsule, Rfl: 0    metoprolol succinate XL (TOPROL-XL) 25 MG 24 hr tablet, Take 0.5 tablets by mouth Daily., Disp: 30 tablet, Rfl: 0    Momelotinib Dihydrochloride (OJJAARA) 200 MG tablet, Take 1 tablet by mouth Daily., Disp: 28 tablet, Rfl: 3    mupirocin (BACTROBAN) 2 % ointment, APPLY TOPICALLY TWICE DAILY TO WOUND, Disp: , Rfl:     ondansetron (Zofran) 4 MG tablet, Take 1 tablet by mouth Every 8 (Eight) Hours As Needed for Nausea or Vomiting., Disp: 90 tablet, Rfl: 0    saccharomyces boulardii (FLORASTOR) 250 MG capsule, Take 1 capsule by mouth 2 (Two) Times a Day., Disp: 60 capsule, Rfl: 0    sertraline (ZOLOFT) 100 MG tablet, Take 1 tablet by mouth Daily., Disp: , Rfl:     simethicone (MYLICON) 80 MG chewable tablet, Chew 1 tablet by mouth 4 (Four) Times a Day As Needed for Flatulence., Disp: 100 tablet, Rfl: 0    sucralfate (CARAFATE) 1 g tablet, Take 1 tablet by mouth Every 12 (Twelve) Hours., Disp: , Rfl:          Objective:     Vitals:    10/18/23 1307   BP: 128/78   BP Location: Left arm   Patient Position: Sitting   Cuff Size: Adult   Pulse: (!) 121   SpO2: 98%   Weight: 66.1 kg (145 lb 12.8 oz)   Height: 162 cm (63.78\")     Body mass index is 25.2 kg/m².    PHYSICAL EXAM:    Vitals Reviewed.   General Appearance: No acute distress, well developed and well nourished.    HENT: No hearing loss noted.    Respiratory: No signs of respiratory distress. Respiration rhythm and depth normal.  Clear to auscultation.   Cardiovascular:  Jugular Venous Pressure: Normal  Heart Rate and Rhythm: Normal rhythm.  Tachycardic.  Heart Sounds: Normal S1 " and S2. No S3 or S4 noted.  Murmurs: No murmurs noted. No rubs, thrills, or gallops.   Lower Extremities: No edema noted.  Musculoskeletal: Normal movement of extremities.  Skin: General appearance normal.    Psychiatric: Patient alert and oriented to person, place, and time. Speech and behavior appropriate. Normal mood and affect.       ECG 12 Lead    Date/Time: 10/18/2023 1:08 PM  Performed by: Estrellita Blanco APRN    Authorized by: Estrellita Blanco APRN  Comparison: compared with previous ECG from 9/27/2023  Similar to previous ECG  Rhythm: sinus tachycardia  Rate: tachycardic  BPM: 121  Conduction: conduction normal  ST Segments: ST segments normal  T Waves: T waves normal  QRS axis: normal    Clinical impression: abnormal EKG            Assessment:       Diagnosis Plan   1. Acute on chronic diastolic heart failure        2. Tachycardia        3. Paroxysmal atrial fibrillation        4. Coronary artery disease involving native coronary artery of native heart without angina pectoris        5. Pulmonary hypertension        6. Nonbacterial thrombotic endocarditis        7. Primary hypertension        8. Myeloproliferative disorder               Plan:       1.  Acute on chronic diastolic heart failure.  NYHA class II.  Euvolemic today.  We discussed the importance of following a low-sodium diet.  Continue metoprolol succinate.  Lisinopril was recently discontinued due to KARLI and hypotension.  Continue furosemide.  Call with any heart failure symptoms.    2.  Tachycardia: Heart rate elevated today.  Increase metoprolol to 25 mg 1 tablet daily which was her previous dosage and her heart rate was well controlled.  They will monitor her blood pressure/heart rates and call with an update in a week.    3.  Paroxysmal Atrial Fibrillation: Currently in a normal rhythm.  Asymptomatic.  [id number] score of 9.  Continue apixaban and she denies bleeding.    4.  Coronary Artery Disease: Status post stent placement to the  diagonal in February 2022.  Status post stent placement to the large diagonal branch for stent restenosis in August 2022.   of mid RCA and nonobstructive disease of the LAD with medical treatment recommended.  Continue clopidogrel.    5.  Severe pulmonary hypertension per echocardiogram.  Denies shortness of breath.    6.  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  Continue SBE prophylaxis.     7.  Hypertension.  Blood pressure well controlled.    8.  Myeloproliferative her disorder followed by hematology.    9.  She will keep her scheduled appointment with me in December just to keep a close check on this heart failure and tachycardia.  Follow-up with Dr. Barriga in 6 months.    As always, it has been a pleasure to participate in your patient's care. Thank you.         Sincerely,         ABEL Urbina  [facility] Cardiology      Dictated utilizing Dragon Dictation

## 2023-10-19 ENCOUNTER — SPECIALTY PHARMACY (OUTPATIENT)
Dept: PHARMACY | Facility: HOSPITAL | Age: 83
End: 2023-10-19
Payer: MEDICARE

## 2023-10-26 ENCOUNTER — SPECIALTY PHARMACY (OUTPATIENT)
Dept: PHARMACY | Facility: HOSPITAL | Age: 83
End: 2023-10-26
Payer: MEDICARE

## 2023-10-26 NOTE — PROGRESS NOTES
MTM telephone encounter re:adherence and side effects (Ojjaara)     Catherine reports starting Ojjaara on 10/19. Thus far, patient denies side effects. She reported some nausea on the first day of her medication, which has since resolved. Thus far, no missed doses. Patient started a course of Levaquin yesterday for UTI, but no other medication changes. Advised pt to call office if any changes. Patient expressed understanding and had no additional questions at this time.       Yara Ornelas, Pharmacy Intern  10/26/2023  10:33 EDT       Gary Reyna, PharmD, [facility]  Clinical Oncology Pharmacist

## 2023-11-01 ENCOUNTER — TELEPHONE (OUTPATIENT)
Dept: ONCOLOGY | Facility: CLINIC | Age: 83
End: 2023-11-01
Payer: MEDICARE

## 2023-11-01 DIAGNOSIS — D53.9 MACROCYTIC ANEMIA: ICD-10-CM

## 2023-11-01 DIAGNOSIS — D47.1 MYELOPROLIFERATIVE DISORDER: Primary | ICD-10-CM

## 2023-11-01 NOTE — TELEPHONE ENCOUNTER
Pts daughter karen states the patient has not been feeling well th past few days( starting Monday). She has not been eating or drinking since not feeling well. Pt feels she is dehydrated. Pt is recovering from a UTI and was on Levaquin for 5 days. Pt has a complicated health history. D/W Dr. Reinoso. Per Dr. Reinoso pt will come in tomorrow for labs and NP visit. Karen informed. They will take pt to ED if her symptoms worsen or they feel it can't wait until tomorrow. Karen V/JOB. Alva Charge RN ok to add for possible fluids at 2pm. Melanie in scheduling will make appt and contact Karen with the time.

## 2023-11-01 NOTE — TELEPHONE ENCOUNTER
Provider: Kemar   Caller: Karen   Relationship to Patient: daughter  Call Back Phone Number: [phone]  Reason for Call: Pt calling about pt WBC being increased Also she is dehydrated and has nausea.

## 2023-11-02 ENCOUNTER — APPOINTMENT (OUTPATIENT)
Dept: CT IMAGING | Facility: HOSPITAL | Age: 83
End: 2023-11-02
Payer: MEDICARE

## 2023-11-02 ENCOUNTER — TELEPHONE (OUTPATIENT)
Dept: ONCOLOGY | Facility: CLINIC | Age: 83
End: 2023-11-02
Payer: MEDICARE

## 2023-11-02 ENCOUNTER — HOSPITAL ENCOUNTER (INPATIENT)
Facility: HOSPITAL | Age: 83
LOS: 20 days | Discharge: HOME | End: 2023-11-22
Attending: EMERGENCY MEDICINE | Admitting: INTERNAL MEDICINE
Payer: MEDICARE

## 2023-11-02 DIAGNOSIS — R19.7 NAUSEA VOMITING AND DIARRHEA: ICD-10-CM

## 2023-11-02 DIAGNOSIS — N17.9 ACUTE RENAL FAILURE, UNSPECIFIED ACUTE RENAL FAILURE TYPE: Primary | ICD-10-CM

## 2023-11-02 DIAGNOSIS — R11.2 NAUSEA VOMITING AND DIARRHEA: ICD-10-CM

## 2023-11-02 DIAGNOSIS — R10.9 ACUTE ABDOMINAL PAIN: ICD-10-CM

## 2023-11-02 DIAGNOSIS — R91.8 PULMONARY INFILTRATE: ICD-10-CM

## 2023-11-02 LAB
ALBUMIN SERPL-MCNC: 3.7 G/DL (ref 3.5–5.2)
ALBUMIN/GLOB SERPL: 1.5 G/DL
ALP SERPL-CCNC: 115 U/L (ref 39–117)
ALT SERPL W P-5'-P-CCNC: 17 U/L (ref 1–33)
ANION GAP SERPL CALCULATED.3IONS-SCNC: 14 MMOL/L (ref 5–15)
ANISOCYTOSIS BLD QL: ABNORMAL
AST SERPL-CCNC: 22 U/L (ref 1–32)
BASOPHILS # BLD MANUAL: 0.94 10*3/MM3 (ref 0–0.2)
BASOPHILS NFR BLD MANUAL: 2.1 % (ref 0–1.5)
BILIRUB SERPL-MCNC: 0.5 MG/DL (ref 0–1.2)
BUN SERPL-MCNC: 55 MG/DL (ref 8–23)
BUN/CREAT SERPL: 18.6 (ref 7–25)
BURR CELLS BLD QL SMEAR: ABNORMAL
CALCIUM SPEC-SCNC: 8.5 MG/DL (ref 8.6–10.5)
CHLORIDE SERPL-SCNC: 98 MMOL/L (ref 98–107)
CO2 SERPL-SCNC: 20 MMOL/L (ref 22–29)
CREAT SERPL-MCNC: 2.96 MG/DL (ref 0.57–1)
DACRYOCYTES BLD QL SMEAR: ABNORMAL
DEPRECATED RDW RBC AUTO: 57 FL (ref 37–54)
EGFRCR SERPLBLD CKD-EPI 2021: 15 ML/MIN/1.73
EOSINOPHIL # BLD MANUAL: 0.49 10*3/MM3 (ref 0–0.4)
EOSINOPHIL NFR BLD MANUAL: 1.1 % (ref 0.3–6.2)
ERYTHROCYTE [DISTWIDTH] IN BLOOD BY AUTOMATED COUNT: 18 % (ref 12.3–15.4)
GLOBULIN UR ELPH-MCNC: 2.4 GM/DL
GLUCOSE SERPL-MCNC: 148 MG/DL (ref 65–99)
HCT VFR BLD AUTO: 24.6 % (ref 34–46.6)
HGB BLD-MCNC: 8.1 G/DL (ref 12–15.9)
LIPASE SERPL-CCNC: 34 U/L (ref 13–60)
LYMPHOCYTES # BLD MANUAL: 2.87 10*3/MM3 (ref 0.7–3.1)
LYMPHOCYTES NFR BLD MANUAL: 3.2 % (ref 5–12)
MACROCYTES BLD QL SMEAR: ABNORMAL
MCH RBC QN AUTO: 29.6 PG (ref 26.6–33)
MCHC RBC AUTO-ENTMCNC: 32.9 G/DL (ref 31.5–35.7)
MCV RBC AUTO: 89.8 FL (ref 79–97)
MONOCYTES # BLD: 1.43 10*3/MM3 (ref 0.1–0.9)
NEUTROPHILS # BLD AUTO: 39.04 10*3/MM3 (ref 1.7–7)
NEUTROPHILS NFR BLD MANUAL: 87.2 % (ref 42.7–76)
PLAT MORPH BLD: NORMAL
PLATELET # BLD AUTO: 522 10*3/MM3 (ref 140–450)
PMV BLD AUTO: 10.2 FL (ref 6–12)
POIKILOCYTOSIS BLD QL SMEAR: ABNORMAL
POTASSIUM SERPL-SCNC: 4.2 MMOL/L (ref 3.5–5.2)
PROT SERPL-MCNC: 6.1 G/DL (ref 6–8.5)
RBC # BLD AUTO: 2.74 10*6/MM3 (ref 3.77–5.28)
SODIUM SERPL-SCNC: 132 MMOL/L (ref 136–145)
STOMATOCYTES BLD QL SMEAR: ABNORMAL
VARIANT LYMPHS NFR BLD MANUAL: 6.4 % (ref 19.6–45.3)
WBC MORPH BLD: NORMAL
WBC NRBC COR # BLD: 44.77 10*3/MM3 (ref 3.4–10.8)

## 2023-11-02 PROCEDURE — 25010000002 ONDANSETRON PER 1 MG: Performed by: EMERGENCY MEDICINE

## 2023-11-02 PROCEDURE — 25010000002 MORPHINE PER 10 MG: Performed by: EMERGENCY MEDICINE

## 2023-11-02 PROCEDURE — 85007 BL SMEAR W/DIFF WBC COUNT: CPT | Performed by: EMERGENCY MEDICINE

## 2023-11-02 PROCEDURE — 25810000003 SODIUM CHLORIDE 0.9 % SOLUTION: Performed by: EMERGENCY MEDICINE

## 2023-11-02 PROCEDURE — 36415 COLL VENOUS BLD VENIPUNCTURE: CPT

## 2023-11-02 PROCEDURE — 80053 COMPREHEN METABOLIC PANEL: CPT | Performed by: EMERGENCY MEDICINE

## 2023-11-02 PROCEDURE — 83690 ASSAY OF LIPASE: CPT | Performed by: EMERGENCY MEDICINE

## 2023-11-02 PROCEDURE — 85025 COMPLETE CBC W/AUTO DIFF WBC: CPT | Performed by: EMERGENCY MEDICINE

## 2023-11-02 PROCEDURE — 74176 CT ABD & PELVIS W/O CONTRAST: CPT

## 2023-11-02 PROCEDURE — 99285 EMERGENCY DEPT VISIT HI MDM: CPT

## 2023-11-02 RX ORDER — ONDANSETRON 4 MG/1
4 TABLET, FILM COATED ORAL EVERY 6 HOURS PRN
Status: DISCONTINUED | OUTPATIENT
Start: 2023-11-02 | End: 2023-11-22 | Stop reason: HOSPADM

## 2023-11-02 RX ORDER — FAMOTIDINE 20 MG/1
20 TABLET, FILM COATED ORAL DAILY
Status: DISCONTINUED | OUTPATIENT
Start: 2023-11-03 | End: 2023-11-22 | Stop reason: HOSPADM

## 2023-11-02 RX ORDER — BISACODYL 5 MG/1
5 TABLET, DELAYED RELEASE ORAL DAILY PRN
Status: DISCONTINUED | OUTPATIENT
Start: 2023-11-02 | End: 2023-11-22 | Stop reason: HOSPADM

## 2023-11-02 RX ORDER — AMOXICILLIN 250 MG
2 CAPSULE ORAL 2 TIMES DAILY
Status: DISCONTINUED | OUTPATIENT
Start: 2023-11-02 | End: 2023-11-22 | Stop reason: HOSPADM

## 2023-11-02 RX ORDER — LATANOPROST 50 UG/ML
1 SOLUTION/ DROPS OPHTHALMIC NIGHTLY
Status: DISCONTINUED | OUTPATIENT
Start: 2023-11-03 | End: 2023-11-22 | Stop reason: HOSPADM

## 2023-11-02 RX ORDER — BISACODYL 10 MG
10 SUPPOSITORY, RECTAL RECTAL DAILY PRN
Status: DISCONTINUED | OUTPATIENT
Start: 2023-11-02 | End: 2023-11-22 | Stop reason: HOSPADM

## 2023-11-02 RX ORDER — ACETAMINOPHEN 325 MG/1
650 TABLET ORAL EVERY 4 HOURS PRN
Status: DISCONTINUED | OUTPATIENT
Start: 2023-11-02 | End: 2023-11-22 | Stop reason: HOSPADM

## 2023-11-02 RX ORDER — SERTRALINE HYDROCHLORIDE 100 MG/1
100 TABLET, FILM COATED ORAL DAILY
Status: DISCONTINUED | OUTPATIENT
Start: 2023-11-03 | End: 2023-11-22 | Stop reason: HOSPADM

## 2023-11-02 RX ORDER — SODIUM CHLORIDE 0.9 % (FLUSH) 0.9 %
10 SYRINGE (ML) INJECTION AS NEEDED
Status: DISCONTINUED | OUTPATIENT
Start: 2023-11-02 | End: 2023-11-22 | Stop reason: HOSPADM

## 2023-11-02 RX ORDER — UREA 10 %
3 LOTION (ML) TOPICAL NIGHTLY PRN
Status: DISCONTINUED | OUTPATIENT
Start: 2023-11-02 | End: 2023-11-22 | Stop reason: HOSPADM

## 2023-11-02 RX ORDER — METOPROLOL SUCCINATE 25 MG/1
25 TABLET, EXTENDED RELEASE ORAL DAILY
Status: DISCONTINUED | OUTPATIENT
Start: 2023-11-03 | End: 2023-11-22 | Stop reason: HOSPADM

## 2023-11-02 RX ORDER — CLOPIDOGREL BISULFATE 75 MG/1
75 TABLET ORAL DAILY
Status: DISCONTINUED | OUTPATIENT
Start: 2023-11-03 | End: 2023-11-04

## 2023-11-02 RX ORDER — ONDANSETRON 2 MG/ML
4 INJECTION INTRAMUSCULAR; INTRAVENOUS ONCE
Status: COMPLETED | OUTPATIENT
Start: 2023-11-02 | End: 2023-11-02

## 2023-11-02 RX ORDER — POLYETHYLENE GLYCOL 3350 17 G/17G
17 POWDER, FOR SOLUTION ORAL DAILY PRN
Status: DISCONTINUED | OUTPATIENT
Start: 2023-11-02 | End: 2023-11-22 | Stop reason: HOSPADM

## 2023-11-02 RX ORDER — SACCHAROMYCES BOULARDII 250 MG
250 CAPSULE ORAL 2 TIMES DAILY
Status: DISCONTINUED | OUTPATIENT
Start: 2023-11-03 | End: 2023-11-22 | Stop reason: HOSPADM

## 2023-11-02 RX ORDER — MORPHINE SULFATE 2 MG/ML
2 INJECTION, SOLUTION INTRAMUSCULAR; INTRAVENOUS ONCE
Status: COMPLETED | OUTPATIENT
Start: 2023-11-02 | End: 2023-11-02

## 2023-11-02 RX ORDER — SODIUM CHLORIDE 9 MG/ML
75 INJECTION, SOLUTION INTRAVENOUS CONTINUOUS
Status: DISCONTINUED | OUTPATIENT
Start: 2023-11-02 | End: 2023-11-06

## 2023-11-02 RX ORDER — ONDANSETRON 2 MG/ML
4 INJECTION INTRAMUSCULAR; INTRAVENOUS EVERY 6 HOURS PRN
Status: DISCONTINUED | OUTPATIENT
Start: 2023-11-02 | End: 2023-11-22 | Stop reason: HOSPADM

## 2023-11-02 RX ADMIN — SODIUM CHLORIDE 500 ML: 9 INJECTION, SOLUTION INTRAVENOUS at 17:14

## 2023-11-02 RX ADMIN — MORPHINE SULFATE 2 MG: 2 INJECTION, SOLUTION INTRAMUSCULAR; INTRAVENOUS at 17:16

## 2023-11-02 RX ADMIN — ONDANSETRON 4 MG: 2 INJECTION INTRAMUSCULAR; INTRAVENOUS at 17:15

## 2023-11-02 NOTE — TELEPHONE ENCOUNTER
I spoke to johan, Pts daughter. She states the pt is too sick to come in. She had a fall this am. She was checked out and told she didn't need to go to the ED. I explained to Johan that the pt should be seen by a provider since she is feeling so bad. Johan will speak to the patient and let me know.    Johan called back and states the patient refuses to go anywhere. I advised her to take her to the ED if her symptoms worsen.

## 2023-11-02 NOTE — ED PROVIDER NOTES
EMERGENCY DEPARTMENT ENCOUNTER    Room Number:  25/25  PCP: Kristi Moreau APRN  Patient Care Team:  Kristi Moreau APRN as PCP - General (Nurse Practitioner)  Gerard Foreman MD as Referring Physician (Medical Oncology)  Darrell Reinoso MD as Consulting Physician (Hematology and Oncology)  Albin Barriga MD as Cardiologist (Cardiology)  Richard Aldana, RN as    Independent Historians: Patient, family, EMS    HPI:  Chief Complaint: Abdominal pain, nausea, vomiting, diarrhea    A complete HPI/ROS/PMH/PSH/SH/FH are unobtainable due to: Nothing    Chronic or social conditions impacting patient care (Social Determinants of Health): None  (Financial Resource Strain / Food Insecurity / Transportation Needs / Physical Activity / Stress / Social Connections / Intimate Partner Violence / Housing Stability)    Context: Catherine Boyer is a 83 y.o. female who presents to the ED c/o acute abdominal pain.  Daughter reports that since Monday she has had some abdominal cramping and discomfort.  She has had some nausea.  She reports she has developed vomiting and diarrhea.  She has not been able to tolerate anything by mouth.  Daughter reports that she gave her a Phenergan suppository last night.  She states she has taken an Imodium.  Nothing makes her symptoms worse or better.  She denies fever.    Review of prior external notes (non-ED) -and- Review of prior external test results outside of this encounter: [facility]agen note dated 10/18/2023 with myelofibrosis.  She was examined with Plavix and Eliquis.    Prescription drug monitoring program review:         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    Paroxysmal atrial fibrillation 07/14/2022    Myeloproliferative disorder 07/14/2022    Microcytic anemia 07/14/2022    Type 2 diabetes mellitus  with circulatory disorder, without long-term current use of insulin 07/14/2022    GERD (gastroesophageal reflux disease)     Hypertension     Personal history of transient ischemic attack (TIA), 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 deficits 09/04/2022    Porcelain gallbladder     Breast cancer     Atherosclerosis of abdominal aorta 02/05/2023    APC (atrial premature contractions) 02/24/2023    Moderate malnutrition 05/05/2023    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 05/05/2023    Elevated troponin 09/27/2023    TIA (transient ischemic attack) 09/27/2023    PUD (peptic ulcer disease) 09/27/2023    Anemia 09/28/2023    Tachycardia 10/18/2023    Myelofibrosis 10/18/2023    Normocytic anemia 10/18/2023     Resolved Ambulatory Problems     Diagnosis Date Noted    Chest pain with high risk for cardiac etiology 08/31/2022    COVID 10/29/2022    Chronic diastolic heart failure     Acute UTI (urinary tract infection) 11/21/2022    Cholecystitis 11/22/2022    Urinary tract infection without hematuria, site unspecified 01/23/2023    Epigastric abdominal pain 01/24/2023    Gastritis 01/26/2023    ABLA (acute blood loss anemia) 02/04/2023    Hypokalemia 02/07/2023    Rotavirus enteritis 05/04/2023    KARLI (acute kidney injury) 05/04/2023    Acute renal failure, unspecified acute renal failure type 05/04/2023    KARLI (acute kidney injury) 08/13/2023    Intractable nausea and vomiting 08/13/2023    Dyspnea 09/27/2023    Acute on chronic heart failure with preserved ejection fraction (HFpEF) 10/02/2023     Past Medical History:   Diagnosis Date    Atrial fibrillation     Carotid atherosclerosis     Chronic diastolic (congestive) heart failure     Glaucoma     History of cataract     Type 2 diabetes mellitus     Upper GI bleed          PAST SURGICAL HISTORY  Past Surgical History:   Procedure Laterality Date    BREAST LUMPECTOMY  1999    CARDIAC CATHETERIZATION N/A 09/02/2022    Procedure: Coronary angiography;  Surgeon: Guero Verde  MD SIGRID;  Location: [facility] CATH INVASIVE LOCATION;  Service: Cardiovascular;  Laterality: N/A;    CARDIAC CATHETERIZATION N/A 09/02/2022    Procedure: Stent LEFTY coronary;  Surgeon: Guero Verde MD;  Location: [facility] CATH INVASIVE LOCATION;  Service: Cardiovascular;  Laterality: N/A;    CATARACT EXTRACTION  2011    CHOLECYSTECTOMY      CHOLECYSTECTOMY WITH INTRAOPERATIVE CHOLANGIOGRAM N/A 11/28/2022    Procedure: CHOLECYSTECTOMY LAPAROSCOPIC INTRAOPERATIVE CHOLANGIOGRAM;  Surgeon: Dani Grover Jr., MD;  Location: [facility] MAIN OR;  Service: General;  Laterality: N/A;    COLONOSCOPY N/A 02/09/2023    Procedure: COLONOSCOPY TO CECUM & T.I.;  Surgeon: Guero Ferris MD;  Location: [facility] ENDOSCOPY;  Service: Gastroenterology;  Laterality: N/A;  PRE- MELENA, GI BLEED  POST- DIVERTICULOSIS, INT HEMORRHOIDS    ENDOSCOPY N/A 01/25/2023    Procedure: ESOPHAGOGASTRODUODENOSCOPY WITH BIOPSIES;  Surgeon: Charles Diaz MD;  Location: [facility] ENDOSCOPY;  Service: Gastroenterology;  Laterality: N/A;  pre: abd pain, nausea  post: hiatal hernia, mild gastritis, sloughing of the esophagus    ENTEROSCOPY SMALL BOWEL N/A 02/09/2023    Procedure: ENTEROSCOPY SMALL BOWEL;  Surgeon: Guero Ferris MD;  Location: [facility] ENDOSCOPY;  Service: Gastroenterology;  Laterality: N/A;  PRE- MELENA, GI BLEED  POST- HIATAL HERNIA    JOINT REPLACEMENT      UPPER GASTROINTESTINAL ENDOSCOPY           FAMILY HISTORY  Family History   Problem Relation Age of Onset    Ovarian cancer Mother     Lung cancer Father     Lung cancer Sister     Malig Hyperthermia Neg Hx          SOCIAL HISTORY  Social History     Socioeconomic History    Marital status:    Tobacco Use    Smoking status: Former     Packs/day: 1.00     Years: 20.00     Additional pack years: 0.00     Total pack years: 20.00     Types: Cigarettes     Passive exposure: Past    Smokeless tobacco: Never    Tobacco comments:     30  years ago   Vaping Use    Vaping Use: Never used  "  Substance and Sexual Activity    Alcohol use: Yes     Comment: \"rare\"    Drug use: Never    Sexual activity: Defer         ALLERGIES  Aspirin, Oxycodone, Hydroxyurea, and Diphenhydramine hcl        REVIEW OF SYSTEMS  Review of Systems  Included in HPI  All systems reviewed and negative except for those discussed in HPI.      PHYSICAL EXAM    I have reviewed the triage vital signs and nursing notes.    ED Triage Vitals [11/02/23 1617]   Temp Heart Rate Resp BP SpO2   97.9 °F (36.6 °C) 100 16 145/58 96 %      Temp src Heart Rate Source Patient Position BP Location FiO2 (%)   -- -- -- -- --       Physical Exam  GENERAL: Awake, alert, chronically ill-appearing  SKIN: Warm, dry  HENT: Normocephalic, atraumatic  EYES: no scleral icterus  CV: regular rhythm, regular rate  RESPIRATORY: normal effort, lungs clear  ABDOMEN: soft, mild generalized tenderness, nondistended  MUSCULOSKELETAL: no deformity  NEURO: alert, moves all extremities, follows commands                                                               LAB RESULTS  Recent Results (from the past 24 hour(s))   Comprehensive Metabolic Panel    Collection Time: 11/02/23  4:31 PM    Specimen: Blood   Result Value Ref Range    Glucose 148 (H) 65 - 99 mg/dL    BUN 55 (H) 8 - 23 mg/dL    Creatinine 2.96 (H) 0.57 - 1.00 mg/dL    Sodium 132 (L) 136 - 145 mmol/L    Potassium 4.2 3.5 - 5.2 mmol/L    Chloride 98 98 - 107 mmol/L    CO2 20.0 (L) 22.0 - 29.0 mmol/L    Calcium 8.5 (L) 8.6 - 10.5 mg/dL    Total Protein 6.1 6.0 - 8.5 g/dL    Albumin 3.7 3.5 - 5.2 g/dL    ALT (SGPT) 17 1 - 33 U/L    AST (SGOT) 22 1 - 32 U/L    Alkaline Phosphatase 115 39 - 117 U/L    Total Bilirubin 0.5 0.0 - 1.2 mg/dL    Globulin 2.4 gm/dL    A/G Ratio 1.5 g/dL    BUN/Creatinine Ratio 18.6 7.0 - 25.0    Anion Gap 14.0 5.0 - 15.0 mmol/L    eGFR 15.0 (L) >60.0 mL/min/1.73   Lipase    Collection Time: 11/02/23  4:31 PM    Specimen: Blood   Result Value Ref Range    Lipase 34 13 - 60 U/L   CBC Auto " Differential    Collection Time: 11/02/23  4:31 PM    Specimen: Blood   Result Value Ref Range    WBC 44.77 (C) 3.40 - 10.80 10*3/mm3    RBC 2.74 (L) 3.77 - 5.28 10*6/mm3    Hemoglobin 8.1 (L) 12.0 - 15.9 g/dL    Hematocrit 24.6 (L) 34.0 - 46.6 %    MCV 89.8 79.0 - 97.0 fL    MCH 29.6 26.6 - 33.0 pg    MCHC 32.9 31.5 - 35.7 g/dL    RDW 18.0 (H) 12.3 - 15.4 %    RDW-SD 57.0 (H) 37.0 - 54.0 fl    MPV 10.2 6.0 - 12.0 fL    Platelets 522 (H) 140 - 450 10*3/mm3   Manual Differential    Collection Time: 11/02/23  4:31 PM    Specimen: Blood   Result Value Ref Range    Neutrophil % 87.2 (H) 42.7 - 76.0 %    Lymphocyte % 6.4 (L) 19.6 - 45.3 %    Monocyte % 3.2 (L) 5.0 - 12.0 %    Eosinophil % 1.1 0.3 - 6.2 %    Basophil % 2.1 (H) 0.0 - 1.5 %    Neutrophils Absolute 39.04 (H) 1.70 - 7.00 10*3/mm3    Lymphocytes Absolute 2.87 0.70 - 3.10 10*3/mm3    Monocytes Absolute 1.43 (H) 0.10 - 0.90 10*3/mm3    Eosinophils Absolute 0.49 (H) 0.00 - 0.40 10*3/mm3    Basophils Absolute 0.94 (H) 0.00 - 0.20 10*3/mm3    Anisocytosis Mod/2+ None Seen    Alonzo Cells Slight/1+ None Seen    Dacrocytes Slight/1+ None Seen    Macrocytes Slight/1+ None Seen    Poikilocytes Mod/2+ None Seen    Stomatocytes Mod/2+ None Seen    WBC Morphology Normal Normal    Platelet Morphology Normal Normal       Ordered the above labs and independently reviewed the results.        RADIOLOGY  CT Abdomen Pelvis Without Contrast    Result Date: 11/2/2023  CT ABDOMEN PELVIS WO CONTRAST-  INDICATIONS: Abdominal pain  TECHNIQUE: Radiation dose reduction techniques were utilized, including automated exposure control and exposure modulation based on body size. Unenhanced ABDOMEN AND PELVIS CT  COMPARISON: 8/13/2023  FINDINGS:  The gallbladder is surgically absent.  Mild nonspecific nodular thickening of the adrenal glands is seen.  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 A small arterial calcification is apparent at the left renal hilum. No hydronephrosis.  Otherwise  unremarkable unenhanced appearance of the liver, spleen, adrenal glands, pancreas, kidneys, bladder.  No bowel obstruction or abnormal bowel thickening is identified. Colonic diverticula are seen that do not appear inflamed. Minimal hiatal hernia is seen. Pelvic floor relaxation/rectocele is apparent, correlate with clinical exam.  No free intraperitoneal gas or free fluid. Umbilical hernia of fat is noted.  Scattered small mesenteric and para-aortic lymph nodes are seen that are not significant by size criteria.  Abdominal aorta is not aneurysmal. Aortic and other arterial calcifications are present.  The lung bases show a new pleural-based density in the lingula, 1.3 x 3.3 cm on axial image 8, that could be rounded atelectasis or potentially a focus of infection or even neoplasm, correlate clinically, PET/CT correlation can be obtained as indicated, in addition to continued CT follow-up. Reticulonodular opacities in the lower lobes may be inflammatory/infectious in etiology, follow-up recommended.  Degenerative changes are seen in the spine. Stable sclerotic foci in the left aspect of the sacrum, right femur, nonspecific, may represent bone island. No acute fracture is identified.          1. A new indeterminate density in the lingula, as described. Reticulonodular pulmonary opacities in the bilateral lower lobes. Follow-up evaluation advised.  2. Colonic diverticulosis. No acute inflammatory process of bowel is identified. Follow-up as indications persist.  3. No urolithiasis or hydronephrosis. Small arterial calcification of the left renal hilum.  This report was finalized on 11/2/2023 7:22 PM by Dr. Reilly Ritter M.D on Workstation: [id number]       I ordered the above noted radiological studies. Reviewed by me and discussed with radiologist.  See dictation for official radiology interpretation.      PROCEDURES    Procedures      MEDICATIONS GIVEN IN ER    Medications   sodium chloride 0.9 % flush 10 mL (has  no administration in time range)   acetaminophen (TYLENOL) tablet 650 mg (has no administration in time range)   sennosides-docusate (PERICOLACE) 8.6-50 MG per tablet 2 tablet (has no administration in time range)     And   polyethylene glycol (MIRALAX) packet 17 g (has no administration in time range)     And   bisacodyl (DULCOLAX) EC tablet 5 mg (has no administration in time range)     And   bisacodyl (DULCOLAX) suppository 10 mg (has no administration in time range)   ondansetron (ZOFRAN) tablet 4 mg (has no administration in time range)     Or   ondansetron (ZOFRAN) injection 4 mg (has no administration in time range)   melatonin tablet 3 mg (has no administration in time range)   sodium chloride 0.9 % infusion (has no administration in time range)   morphine injection 2 mg (2 mg Intravenous Given 11/2/23 1716)   ondansetron (ZOFRAN) injection 4 mg (4 mg Intravenous Given 11/2/23 1715)   sodium chloride 0.9 % bolus 500 mL (0 mL Intravenous Stopped 11/2/23 1946)         ORDERS PLACED DURING THIS VISIT:  Orders Placed This Encounter   Procedures    Clostridioides difficile Toxin - Stool, Per Rectum    Gastrointestinal Panel, PCR - Stool, Per Rectum    Clostridioides difficile Toxin, PCR - Stool, Per Rectum    CT Abdomen Pelvis Without Contrast    Comprehensive Metabolic Panel    Lipase    CBC Auto Differential    Urinalysis With Microscopic If Indicated (No Culture) - Urine, Clean Catch    Manual Differential    Basic Metabolic Panel    CBC (No Diff)    Diet: Cardiac Diets; Healthy Heart (2-3 Na+); Texture: Regular Texture (IDDSI 7); Fluid Consistency: Thin (IDDSI 0)    Monitor Blood Pressure    Pulse Oximetry, Continuous    Vital Signs    Up with assistance    Daily Weights    Strict Intake & Output    Oral Care    Place Sequential Compression Device    Maintain Sequential Compression Device    Code Status and Medical Interventions:    [facility] (on-call MD unless specified) Details    Inpatient Palliative Care Team  Consult    Inpatient Pulmonology Consult    Inpatient Nephrology Consult    Patient Isolation Contact Spore    Insert Peripheral IV    Inpatient Admission    CBC & Differential         PROGRESS, DATA ANALYSIS, CONSULTS, AND MEDICAL DECISION MAKING    All labs have been independently interpreted by me.  All radiology studies have been reviewed by me and discussed with radiologist dictating the report.   EKG's independently viewed and interpreted by me.  Discussion below represents my analysis of pertinent findings related to patient's condition, differential diagnosis, treatment plan and final disposition.    Differential diagnosis includes but is not limited to appendicitis, bowel obstruction, C. difficile, colitis.    ED Course as of 11/02/23 2023   Thu Nov 02, 2023   1707 Creatinine(!): 2.96 [TR]   1708 BUN(!): 55 [TR]   1708 The patient has acute renal failure based on changing creatinine from 2 weeks ago.  She will require admission. [TR]   1735 WBC(!!): 44.77  Chronic and related to myeloproliferative disorder [TR]   1845 CT Abdomen Pelvis Without Contrast  My independent interpretation of the CT of the abdomen pelvis is no bowel obstruction.  No free air. [TR]   1934 Reviewed work-up and findings with the patient and family at the bedside.  Answered all questions.  Her CT shows no acute process causing abdominal pain.  She has not been able to produce stool or urine here.  Her laboratory evaluation does show acute renal failure.  Plan admission for further evaluation and treatment.  The patient and family are agreeable. [TR]   2023 Discussed with Dr. Macias with [facility].  She agrees to admit. [TR]      ED Course User Index  [TR] Tommy Clemente MD                  AS OF 20:23 EDT VITALS:    BP - 145/58  HR - 100  TEMP - 97.9 °F (36.6 °C)  O2 SATS - 96%        DIAGNOSIS  Final diagnoses:   Acute renal failure, unspecified acute renal failure type   Acute abdominal pain   Nausea vomiting and diarrhea          DISPOSITION  ED Disposition       ED Disposition   Decision to Admit    Condition   --    Comment   Level of Care: Telemetry [5]   Diagnosis: Acute kidney injury [518192]   Admitting Physician: ADEN BRAVO [[id number]]   Attending Physician: ADEN BRAVO [[id number]]   Certification: I certify that inpatient services are medically necessary for this patient for a duration of greater than two midnights. See H&P and MD Progress Notes for additional information about the patient's course of treatment.                    Note Disclaimer: At [facility], we believe that sharing information builds trust and better relationships. You are receiving this note because you recently visited [facility]. It is possible you will see health information before a provider has talked with you about it. This kind of information can be easy to misunderstand. To help you fully understand what it means for your health, we urge you to discuss this note with your provider.         Tommy Clemente MD  11/02/23 2009       Tommy Clemente MD  11/02/23 2023

## 2023-11-02 NOTE — ED NOTES
Patient from assisted living, had abd pain and cramping since Monday and now having nausea vomiting and diarrhea and fatigue.

## 2023-11-02 NOTE — TELEPHONE ENCOUNTER
Caller: RAY RUGGIERO    Relationship to patient: Emergency Contact    Best call back number: [phone]    Chief complaint: SICK    Type of visit: LAB, F/U 2 & INFUSION    Requested date: NOT R/S HAS APPT ON 11/20/2023    If rescheduling, when is the original appointment: 11/2/2023

## 2023-11-02 NOTE — ED NOTES
Nursing report ED to floor  Catherine Boyer  83 y.o.  female    HPI :   Chief Complaint   Patient presents with    Abdominal Pain    Nausea    Vomiting       Admitting doctor:   Jaimee Macias MD    Admitting diagnosis:   The primary encounter diagnosis was Acute renal failure, unspecified acute renal failure type. Diagnoses of Acute abdominal pain and Nausea vomiting and diarrhea were also pertinent to this visit.    Code status:   Current Code Status       Date Active Code Status Order ID Comments User Context       11/2/2023 1949 CPR (Attempt to Resuscitate) [id number]  Jaimee Macias MD ED        Question Answer    Code Status (Patient has no pulse and is not breathing) CPR (Attempt to Resuscitate)    Medical Interventions (Patient has pulse or is breathing) Full                    Allergies:   Aspirin, Oxycodone, Hydroxyurea, and Diphenhydramine hcl    Isolation:   No active isolations    Intake and Output    Intake/Output Summary (Last 24 hours) at 11/2/2023 1953  Last data filed at 11/2/2023 1946  Gross per 24 hour   Intake 500 ml   Output --   Net 500 ml       Weight:   There were no vitals filed for this visit.    Most recent vitals:   Vitals:    11/02/23 1617   BP: 145/58   Pulse: 100   Resp: 16   Temp: 97.9 °F (36.6 °C)   SpO2: 96%       Active LDAs/IV Access:   Lines, Drains & Airways       Active LDAs       Name Placement date Placement time Site Days    Peripheral IV 11/02/23 Left Antecubital 11/02/23  --  Antecubital  less than 1    External Urinary Catheter 08/13/23  2300  --  80                    Labs (abnormal labs have a star):   Labs Reviewed   COMPREHENSIVE METABOLIC PANEL - Abnormal; Notable for the following components:       Result Value    Glucose 148 (*)     BUN 55 (*)     Creatinine 2.96 (*)     Sodium 132 (*)     CO2 20.0 (*)     Calcium 8.5 (*)     eGFR 15.0 (*)     All other components within normal limits    Narrative:     GFR Normal >60  Chronic Kidney Disease  <60  Kidney Failure <15    The GFR formula is only valid for adults with stable renal function between ages 18 and 70.   CBC WITH AUTO DIFFERENTIAL - Abnormal; Notable for the following components:    WBC 44.77 (*)     RBC 2.74 (*)     Hemoglobin 8.1 (*)     Hematocrit 24.6 (*)     RDW 18.0 (*)     RDW-SD 57.0 (*)     Platelets 522 (*)     All other components within normal limits   MANUAL DIFFERENTIAL - Abnormal; Notable for the following components:    Neutrophil % 87.2 (*)     Lymphocyte % 6.4 (*)     Monocyte % 3.2 (*)     Basophil % 2.1 (*)     Neutrophils Absolute 39.04 (*)     Monocytes Absolute 1.43 (*)     Eosinophils Absolute 0.49 (*)     Basophils Absolute 0.94 (*)     All other components within normal limits   LIPASE - Normal   CLOSTRIDIOIDES DIFFICILE TOXIN    Narrative:     The following orders were created for panel order Clostridioides difficile Toxin - Stool, Per Rectum.  Procedure                               Abnormality         Status                     ---------                               -----------         ------                     Clostridioides difficile...[[id number]]                                                   Please view results for these tests on the individual orders.   GASTROINTESTINAL PANEL, PCR (PREFERRED) DOES NOT INCLUDE CDIFF   CLOSTRIDIOIDES DIFFICILE TOXIN, PCR   URINALYSIS W/ MICROSCOPIC IF INDICATED (NO CULTURE)   CBC AND DIFFERENTIAL    Narrative:     The following orders were created for panel order CBC & Differential.  Procedure                               Abnormality         Status                     ---------                               -----------         ------                     CBC Auto Differential[[id number]]        Abnormal            Final result                 Please view results for these tests on the individual orders.       EKG:   No orders to display       Meds given in ED:   Medications   sodium chloride 0.9 % flush 10 mL (has no  administration in time range)   acetaminophen (TYLENOL) tablet 650 mg (has no administration in time range)   sennosides-docusate (PERICOLACE) 8.6-50 MG per tablet 2 tablet (has no administration in time range)     And   polyethylene glycol (MIRALAX) packet 17 g (has no administration in time range)     And   bisacodyl (DULCOLAX) EC tablet 5 mg (has no administration in time range)     And   bisacodyl (DULCOLAX) suppository 10 mg (has no administration in time range)   ondansetron (ZOFRAN) tablet 4 mg (has no administration in time range)     Or   ondansetron (ZOFRAN) injection 4 mg (has no administration in time range)   melatonin tablet 3 mg (has no administration in time range)   morphine injection 2 mg (2 mg Intravenous Given 11/2/23 1716)   ondansetron (ZOFRAN) injection 4 mg (4 mg Intravenous Given 11/2/23 1715)   sodium chloride 0.9 % bolus 500 mL (0 mL Intravenous Stopped 11/2/23 1946)       Imaging results:  CT Abdomen Pelvis Without Contrast    Result Date: 11/2/2023    1. A new indeterminate density in the lingula, as described. Reticulonodular pulmonary opacities in the bilateral lower lobes. Follow-up evaluation advised.  2. Colonic diverticulosis. No acute inflammatory process of bowel is identified. Follow-up as indications persist.  3. No urolithiasis or hydronephrosis. Small arterial calcification of the left renal hilum.  This report was finalized on 11/2/2023 7:22 PM by Dr. Reilly Ritter M.D on Workstation: [id number]       Ambulatory status:   - Assist x2     Social issues:   Social History     Socioeconomic History    Marital status:    Tobacco Use    Smoking status: Former     Packs/day: 1.00     Years: 20.00     Additional pack years: 0.00     Total pack years: 20.00     Types: Cigarettes     Passive exposure: Past    Smokeless tobacco: Never    Tobacco comments:     30  years ago   Vaping Use    Vaping Use: Never used   Substance and Sexual Activity    Alcohol use: Yes     Comment:  "\"rare\"    Drug use: Never    Sexual activity: Defer       NIH Stroke Scale:       Yanet Gutierrez RN  11/02/23 19:53 EDT        "

## 2023-11-03 LAB
ANION GAP SERPL CALCULATED.3IONS-SCNC: 13.8 MMOL/L (ref 5–15)
BACTERIA UR QL AUTO: ABNORMAL /HPF
BILIRUB UR QL STRIP: NEGATIVE
BUN SERPL-MCNC: 53 MG/DL (ref 8–23)
BUN/CREAT SERPL: 21.7 (ref 7–25)
C DIFF TOX GENS STL QL NAA+PROBE: NEGATIVE
CALCIUM SPEC-SCNC: 8.4 MG/DL (ref 8.6–10.5)
CHLORIDE SERPL-SCNC: 101 MMOL/L (ref 98–107)
CHLORIDE UR-SCNC: 31 MMOL/L
CLARITY UR: ABNORMAL
CO2 SERPL-SCNC: 20.2 MMOL/L (ref 22–29)
COLOR UR: YELLOW
CREAT SERPL-MCNC: 2.44 MG/DL (ref 0.57–1)
CREAT UR-MCNC: 113.2 MG/DL
DEPRECATED RDW RBC AUTO: 59.4 FL (ref 37–54)
EGFRCR SERPLBLD CKD-EPI 2021: 19 ML/MIN/1.73
ERYTHROCYTE [DISTWIDTH] IN BLOOD BY AUTOMATED COUNT: 17.8 % (ref 12.3–15.4)
GLUCOSE SERPL-MCNC: 93 MG/DL (ref 65–99)
GLUCOSE UR STRIP-MCNC: NEGATIVE MG/DL
HCT VFR BLD AUTO: 24.1 % (ref 34–46.6)
HGB BLD-MCNC: 7.6 G/DL (ref 12–15.9)
HGB UR QL STRIP.AUTO: ABNORMAL
HYALINE CASTS UR QL AUTO: ABNORMAL /LPF
KETONES UR QL STRIP: NEGATIVE
LEUKOCYTE ESTERASE UR QL STRIP.AUTO: ABNORMAL
MCH RBC QN AUTO: 29.1 PG (ref 26.6–33)
MCHC RBC AUTO-ENTMCNC: 31.5 G/DL (ref 31.5–35.7)
MCV RBC AUTO: 92.3 FL (ref 79–97)
NITRITE UR QL STRIP: NEGATIVE
PH UR STRIP.AUTO: 5.5 [PH] (ref 5–8)
PLATELET # BLD AUTO: 413 10*3/MM3 (ref 140–450)
PMV BLD AUTO: 10.3 FL (ref 6–12)
POTASSIUM SERPL-SCNC: 3.9 MMOL/L (ref 3.5–5.2)
PROT ?TM UR-MCNC: 55.4 MG/DL
PROT UR QL STRIP: ABNORMAL
RBC # BLD AUTO: 2.61 10*6/MM3 (ref 3.77–5.28)
RBC # UR STRIP: ABNORMAL /HPF
REF LAB TEST METHOD: ABNORMAL
SODIUM SERPL-SCNC: 135 MMOL/L (ref 136–145)
SODIUM UR-SCNC: 42 MMOL/L
SP GR UR STRIP: 1.02 (ref 1–1.03)
SQUAMOUS #/AREA URNS HPF: ABNORMAL /HPF
UROBILINOGEN UR QL STRIP: ABNORMAL
WBC # UR STRIP: ABNORMAL /HPF
WBC NRBC COR # BLD: 39.23 10*3/MM3 (ref 3.4–10.8)

## 2023-11-03 PROCEDURE — 82436 ASSAY OF URINE CHLORIDE: CPT | Performed by: INTERNAL MEDICINE

## 2023-11-03 PROCEDURE — 80048 BASIC METABOLIC PNL TOTAL CA: CPT | Performed by: INTERNAL MEDICINE

## 2023-11-03 PROCEDURE — 84156 ASSAY OF PROTEIN URINE: CPT | Performed by: INTERNAL MEDICINE

## 2023-11-03 PROCEDURE — 85027 COMPLETE CBC AUTOMATED: CPT | Performed by: INTERNAL MEDICINE

## 2023-11-03 PROCEDURE — 97166 OT EVAL MOD COMPLEX 45 MIN: CPT

## 2023-11-03 PROCEDURE — 87493 C DIFF AMPLIFIED PROBE: CPT | Performed by: EMERGENCY MEDICINE

## 2023-11-03 PROCEDURE — 36415 COLL VENOUS BLD VENIPUNCTURE: CPT | Performed by: INTERNAL MEDICINE

## 2023-11-03 PROCEDURE — 25810000003 SODIUM CHLORIDE 0.9 % SOLUTION: Performed by: INTERNAL MEDICINE

## 2023-11-03 PROCEDURE — 97535 SELF CARE MNGMENT TRAINING: CPT

## 2023-11-03 PROCEDURE — 84300 ASSAY OF URINE SODIUM: CPT | Performed by: INTERNAL MEDICINE

## 2023-11-03 PROCEDURE — 97530 THERAPEUTIC ACTIVITIES: CPT

## 2023-11-03 PROCEDURE — 97162 PT EVAL MOD COMPLEX 30 MIN: CPT

## 2023-11-03 PROCEDURE — 81001 URINALYSIS AUTO W/SCOPE: CPT | Performed by: EMERGENCY MEDICINE

## 2023-11-03 PROCEDURE — 87088 URINE BACTERIA CULTURE: CPT | Performed by: STUDENT IN AN ORGANIZED HEALTH CARE EDUCATION/TRAINING PROGRAM

## 2023-11-03 PROCEDURE — 99222 1ST HOSP IP/OBS MODERATE 55: CPT

## 2023-11-03 PROCEDURE — 87086 URINE CULTURE/COLONY COUNT: CPT | Performed by: STUDENT IN AN ORGANIZED HEALTH CARE EDUCATION/TRAINING PROGRAM

## 2023-11-03 PROCEDURE — 82570 ASSAY OF URINE CREATININE: CPT | Performed by: INTERNAL MEDICINE

## 2023-11-03 PROCEDURE — 87186 SC STD MICRODIL/AGAR DIL: CPT | Performed by: STUDENT IN AN ORGANIZED HEALTH CARE EDUCATION/TRAINING PROGRAM

## 2023-11-03 PROCEDURE — 87507 IADNA-DNA/RNA PROBE TQ 12-25: CPT | Performed by: EMERGENCY MEDICINE

## 2023-11-03 RX ADMIN — LATANOPROST 1 DROP: 50 SOLUTION/ DROPS OPHTHALMIC at 21:50

## 2023-11-03 RX ADMIN — LATANOPROST 1 DROP: 50 SOLUTION/ DROPS OPHTHALMIC at 01:51

## 2023-11-03 RX ADMIN — SERTRALINE 100 MG: 100 TABLET, FILM COATED ORAL at 10:02

## 2023-11-03 RX ADMIN — FAMOTIDINE 20 MG: 20 TABLET ORAL at 10:02

## 2023-11-03 RX ADMIN — Medication 250 MG: at 22:40

## 2023-11-03 RX ADMIN — SODIUM CHLORIDE 75 ML/HR: 9 INJECTION, SOLUTION INTRAVENOUS at 00:02

## 2023-11-03 RX ADMIN — Medication 250 MG: at 01:40

## 2023-11-03 RX ADMIN — Medication 250 MG: at 10:02

## 2023-11-03 RX ADMIN — METOPROLOL SUCCINATE 25 MG: 25 TABLET, EXTENDED RELEASE ORAL at 10:02

## 2023-11-03 NOTE — PLAN OF CARE
Goal Outcome Evaluation:  Plan of Care Reviewed With: patient, daughter           Outcome Evaluation: Pt is an 82 y/o F admitted with abdominal pain, N/V/D. Dx with KARLI. She lives at [facility] and is typically indep with ADLs and mobility with rollator. Today she presents with dec strength, endurance and activity tolerance. CGA to sit EOB and assist to don socks. She performs short fxl ambulation in/out of the bathroom with SBA-CGA using RW to simulate household mobility. She stands at the sink to complete grooming task with set-up/spv and then reports fatigue, requesting to return to supine in bed. SpO2 after bn=291% on 4L. Recommend return to [facility] with assist as needed. Pt's family asking for palliative consult- RN notified.

## 2023-11-03 NOTE — DISCHARGE PLACEMENT REQUEST
"Maribell Boyer (83 y.o. Female)       Date of Birth   1940    Social Security Number       Address   [facility] [address]    Home Phone   [phone]    MRN   [id number]       Confucianist   Unknown    Marital Status                               Admission Date   11/2/23    Admission Type   Emergency    Admitting Provider   Jaimee Macias MD    Attending Provider   Junior Doan MD    Department, Room/Bed   [address], [address]1       Discharge Date       Discharge Disposition       Discharge Destination                                 Attending Provider: Junior Doan MD    Allergies: Aspirin, Oxycodone, Hydroxyurea, Diphenhydramine Hcl    Isolation: Spore   Infection: C.difficile (rule out) (11/02/23)   Code Status: CPR    Ht: 162 cm (63.78\")   Wt: 63 kg (138 lb 14.2 oz)    Admission Cmt: None   Principal Problem: Acute kidney injury [N17.9]                   Active Insurance as of 11/2/2023       Primary Coverage       Payor Plan Insurance Group Employer/Plan Group    AETNA MEDICARE REPLACEMENT AETNA MEDICARE REPLACEMENT [id number]       Payor Plan Address Payor Plan Phone Number Payor Plan Fax Number Effective Dates    [address] [phone]  1/1/2022 - None Entered    [facility] [id number]         Subscriber Name Subscriber Birth Date Member ID       MARIBELL BOYER 1940 [id number]                     Emergency Contacts        (Rel.) Home Phone Work Phone Mobile Phone    RAY BOYER (Daughter) [phone] -- [phone]    Karan Boyer [facility] (Son) -- -- [phone]    Karen Gutierrez (Daughter) -- -- [phone]    Greg Boyer (Son) -- -- [phone]    MEG BANKS (Brother) -- -- [phone]            "

## 2023-11-03 NOTE — DISCHARGE PLACEMENT REQUEST
"Maribell Boyer (83 y.o. Female)       Date of Birth   1940    Social Security Number       Address   Replaced by [facility] [address]    Home Phone   [phone]    MRN   [id number]       Advent   Unknown    Marital Status                               Admission Date   11/2/23    Admission Type   Emergency    Admitting Provider   Jaimee Macias MD    Attending Provider   Junior Doan MD    Department, Room/Bed   [address], [address]1       Discharge Date       Discharge Disposition       Discharge Destination                                 Attending Provider: Junior Doan MD    Allergies: Aspirin, Oxycodone, Hydroxyurea, Diphenhydramine Hcl    Isolation: Spore   Infection: C.difficile (rule out) (11/02/23)   Code Status: CPR    Ht: 162 cm (63.78\")   Wt: 63 kg (138 lb 14.2 oz)    Admission Cmt: None   Principal Problem: Acute kidney injury [N17.9]                   Active Insurance as of 11/2/2023       Primary Coverage       Payor Plan Insurance Group Employer/Plan Group    AETNA MEDICARE REPLACEMENT AETNA MEDICARE REPLACEMENT [id number]       Payor Plan Address Payor Plan Phone Number Payor Plan Fax Number Effective Dates    [address] [phone]  1/1/2022 - None Entered    [facility] [id number]         Subscriber Name Subscriber Birth Date Member ID       MARIBELL BOYER 1940 [id number]                     Emergency Contacts        (Rel.) Home Phone Work Phone Mobile Phone    RAY BOYER (Daughter) [phone] -- [phone]    Karan Boyer [facility]ADA/[facility] (Son) -- -- [phone]    Karen Gutierrez (Daughter) -- -- [phone]    Greg Boyer (Son) -- -- [phone]    MEG BANKS (Brother) -- -- [phone]              " History and Physical (focused and Comprehensive)    Nicholas Mejias  1942    Chief Complaint (with details of present illness): Painless, progressive loss of vision consistent with Nuclear Sclerotic Cataract Right  eye.     History (past and present):  Past Medical History:   Diagnosis Date   • Arthritis    • Diabetes mellitus (CMS/HCC) on meds since Dec 2013    checks blood sugars few times a week   • Esophageal reflux    • Essential (primary) hypertension    • Open angle with borderline findings, low risk    • Other and unspecified hyperlipidemia     no meds   • Prostate cancer (CMS/HCC) 2006   • Pseudophakia of both eyes    • Pseudophakia of left eye    • Sleep apnea     CPAP   • Urinary retention        Surgeries:  Past Surgical History:   Procedure Laterality Date   • Cataract extraction w/  intraocular lens implant   Feb 12, 2014    OS   • Cataract extraction w/  intraocular lens implant Right 06/01/2021    JTB   • Colonoscopy     • Prostate gold seed implant     • Total knee replacement Left 11/11/2015       Prior to Admission medications    Medication Sig Start Date End Date Taking? Authorizing Provider   moxifloxacin (VIGAMOX) 0.5 % ophthalmic solution Place 1 drop into right eye 3 times daily. Start 3 days BEFORE surgery and bring to surgery 5/19/21  Yes Raffi Solorio MD   bromfenac (Prolensa) 0.07 % ophthalmic solution Start 3 days BEFORE surgery and bring to surgery. Place 1 drop in the surgical eye daily . Use for 2 weeks in the surgical eye then stop. 5/19/21  Yes Raffi Solorio MD   fluticasone (FLONASE) 50 MCG/ACT nasal spray Spray 2 sprays in each nostril as needed.  2/18/21  Yes Outside Provider   furosemide (LASIX) 40 MG tablet Take 40 mg by mouth 2 (two) times a day. 4/12/21  Yes Outside Provider   glipiZIDE (GLUCOTROL) 5 MG tablet Take 1 tablet by mouth daily. 5/11/21  Yes Outside Provider   hydrALAZINE (APRESOLINE) 10 MG tablet Take 10 mg by mouth 3 times daily.  3/14/21  Yes  Outside Provider   Cholecalciferol (VITAMIN D3) 50 mcg (2,000 units) tablet Take 2,000 Units by mouth daily.   Yes Outside Provider   losartan (COZAAR) 25 MG tablet Take 1 tablet by mouth daily. 1/6/20  Yes Outside Provider   nitroGLYcerin (NITROSTAT) 0.4 MG sublingual tablet Place 0.4 mg under the tongue every 5 minutes as needed. 11/23/19 5/25/21 Yes Outside Provider   latanoprost (XALATAN) 0.005 % ophthalmic solution Place 1 drop into both eyes nightly. 3/16/20  Yes Michael Solorio MD   Camphor-Eucalyptus-Menthol (VICKS VAPORUB) 4.7-1.2-2.6 % Ointment Spray in each nostril as needed.   Yes Outside Provider   omeprazole (PRILOSEC) 10 MG capsule Take 10 mg by mouth daily. 12/18/18  Yes Outside Provider   acetaminophen (TYLENOL) 500 MG tablet Take 500 mg by mouth every 4 hours as needed.    Yes Outside Provider   butalbital-acetaminophen  MG tablet Take 1-2 tablets by mouth every 6 hours as needed.   Yes Outside Provider   NIFEdipine (ADALAT CC) 90 MG 24 hr tablet Take 90 mg by mouth daily. 12/18/17  Yes Outside Provider   Aspirin (ST MICHAEL ADULT LOW DOSE PO) Take 81 mg by mouth daily.    Yes Outside Provider   atorvastatin (LIPITOR) 10 MG tablet Take 10 mg by mouth daily.  12/29/17  Yes Outside Provider   carvedilol (COREG) 25 MG tablet Take 25 mg by mouth 2 times daily (with meals). 1 am 1.5 pm 1/3/18  Yes Outside Provider   Dutasteride-Tamsulosin HCl (LIONEL) 0.5-0.4 MG CAPS Take 1 capsule by mouth nightly.   Yes Outside Provider   potassium chloride (K-DUR,KLOR-CON) 20 MEQ CR tablet Take 20 mEq by mouth daily.   Yes Outside Provider   prednisoLONE acetate (PRED FORTE) 1 % ophthalmic suspension Place 1 drop into right eye 3 times daily. Start drops AFTER surgery and continue until gone. 5/19/21   Michael Solorio MD   amoxicillin (AMOXIL) 500 MG capsule Prior to dental procedures 5/9/16   Outside Provider   indomethacin (INDOCIN) 50 MG capsule Take 50 mg by mouth 3 times daily as needed.     Outside  Provider       ALLERGIES:  No Known Allergies    Visit Vitals  BP (!) 143/80   Pulse 65   Temp 98.6 °F (37 °C) (Temporal)   Resp 18   Ht 5' 8\" (1.727 m)   Wt 116.1 kg   SpO2 98%   BMI 38.92 kg/m²       Physical Review of Systems:  Teeth: deferred  Neck: deferred  Heart: Regular Rate Rhythm             S1 S2 without murmur  Lungs: clear to auscultation  Abdomen: deferred  Back: deferred  Extremities: deferred  Genitalia: deferred                 Comprehensive: N/A  Family History: Non contributory  Social History: Non contributory    Impression:  Visually significant Nuclear Sclerotic Cataract Right  eye      Risks, benefits and alternatives discussed with the patient.   Planned course of action:  Cataract extraction with Right  eye.  Specific lens type reviewed and verified with patient.  Patient will have Standard IOL.      Raffi Solorio MD  6/1/2021

## 2023-11-03 NOTE — CONSULTS
[facility]                                                                                             Nephrology Initial Consult Note    Patient Identification:  Name: Catherine Boyer MRN: [id number]  Age: 83 y.o. : 1940  Sex: female  Date:11/3/2023    Requesting Physician: As per consult order.  Reason for Consultation: KARLI  Information from:patient/ family/ chart      History of Present Illness: This is a 83 y.o. year old female with no prior history of chronic kidney disease, medical history includes myelofibrosis, diastolic CHF.  She presented to the ER last night with low oral intake, nausea vomiting and malaise for the last several days.  CT showed a lung mass she was also noted to have renal failure and was started on IV fluids.  Initial creatinine 2.9 has improved down to 2.4 with IV fluids.  Initial WBC count 44,000, she chronically runs 30-50,000.  Home meds include Lasix 40 mg daily, no ACE inhibitor/ARB/NSAIDs.  No hypotension, initial soft BP on admission.    The following medical history and medications personally reviewed by me:    Problem List:     Acute kidney injury      Past Medical History:  Past Medical History:   Diagnosis Date    Atherosclerosis of abdominal aorta 2023    Atrial fibrillation     Breast cancer     CAD (coronary artery disease)     NSTEMI 2022: 90% ostial LAD, 99% D1, 70% mid-distal LAD (medical therapy). She received two stents (2.5x18 and 2.5x26mm Finesse LEFTY) but I don't know which one went to which lesion.    Carotid atherosclerosis     Cholecystitis 2022    Added automatically from request for surgery [id number]    Chronic diastolic (congestive) heart failure     COVID 10/29/2022    GERD (gastroesophageal reflux disease)     Glaucoma     History of cataract     Hypertension     Microcytic anemia     per Dr. oleg pate office  note 22-dd     Myeloproliferative disorder     JAK2 positive    Nonbacterial thrombotic endocarditis     6/2021: 4x5mm vegetation on the ventricular surface of the anterior MV, negative blood cultures    Porcelain gallbladder     PUD (peptic ulcer disease)     TIA (transient ischemic attack)     Type 2 diabetes mellitus     Type 2 diabetes mellitus with circulatory disorder, without long-term current use of insulin 07/14/2022    Upper GI bleed        Past Surgical History:  Past Surgical History:   Procedure Laterality Date    BREAST LUMPECTOMY  1999    CARDIAC CATHETERIZATION N/A 09/02/2022    Procedure: Coronary angiography;  Surgeon: Guero Verde MD;  Location: [facility] CATH INVASIVE LOCATION;  Service: Cardiovascular;  Laterality: N/A;    CARDIAC CATHETERIZATION N/A 09/02/2022    Procedure: Stent LEFTY coronary;  Surgeon: Guero Verde MD;  Location: [facility] CATH INVASIVE LOCATION;  Service: Cardiovascular;  Laterality: N/A;    CATARACT EXTRACTION  2011    CHOLECYSTECTOMY      CHOLECYSTECTOMY WITH INTRAOPERATIVE CHOLANGIOGRAM N/A 11/28/2022    Procedure: CHOLECYSTECTOMY LAPAROSCOPIC INTRAOPERATIVE CHOLANGIOGRAM;  Surgeon: Dani Grover Jr., MD;  Location: [facility] MAIN OR;  Service: General;  Laterality: N/A;    COLONOSCOPY N/A 02/09/2023    Procedure: COLONOSCOPY TO CECUM & T.I.;  Surgeon: Guero Ferris MD;  Location: [facility] ENDOSCOPY;  Service: Gastroenterology;  Laterality: N/A;  PRE- MELENA, GI BLEED  POST- DIVERTICULOSIS, INT HEMORRHOIDS    ENDOSCOPY N/A 01/25/2023    Procedure: ESOPHAGOGASTRODUODENOSCOPY WITH BIOPSIES;  Surgeon: Charles Diaz MD;  Location: [facility] ENDOSCOPY;  Service: Gastroenterology;  Laterality: N/A;  pre: abd pain, nausea  post: hiatal hernia, mild gastritis, sloughing of the esophagus    ENTEROSCOPY SMALL BOWEL N/A 02/09/2023    Procedure: ENTEROSCOPY SMALL BOWEL;  Surgeon: Guero Ferris MD;  Location: [facility] ENDOSCOPY;  Service: Gastroenterology;  Laterality: N/A;  PRE- MELENA, GI  BLEED  POST- HIATAL HERNIA    JOINT REPLACEMENT      UPPER GASTROINTESTINAL ENDOSCOPY          Home Meds:   Medications Prior to Admission   Medication Sig Dispense Refill Last Dose    acetaminophen (TYLENOL) 500 MG tablet Take 1 tablet by mouth As Needed.   11/1/2023    apixaban (ELIQUIS) 5 MG tablet tablet Take 1 tablet by mouth 2 (Two) Times a Day. 180 tablet 3 11/2/2023    clopidogrel (PLAVIX) 75 MG tablet TAKE 1 TABLET DAILY 30 tablet 11 11/2/2023    famotidine (PEPCID) 20 MG tablet Take 1 tablet by mouth Daily.   11/2/2023    furosemide (LASIX) 40 MG tablet TAKE 1 TABLET DAILY 90 tablet 3 11/2/2023    hydrocortisone-bacitracin-zinc oxide-nystatin (MAGIC BARRIER) Apply 1 application topically to the appropriate area as directed 2 (Two) Times a Day.   11/2/2023    latanoprost (XALATAN) 0.005 % ophthalmic solution Administer 1 drop to both eyes.   11/2/2023    metoprolol succinate XL (TOPROL-XL) 25 MG 24 hr tablet Take 1 tablet by mouth Daily. 90 tablet 2 11/2/2023    Momelotinib Dihydrochloride (OJJAARA) 200 MG tablet Take 1 tablet by mouth Daily. 28 tablet 3 11/2/2023    ondansetron (Zofran) 4 MG tablet Take 1 tablet by mouth Every 8 (Eight) Hours As Needed for Nausea or Vomiting. 90 tablet 0 Past Week    saccharomyces boulardii (FLORASTOR) 250 MG capsule Take 1 capsule by mouth 2 (Two) Times a Day. 60 capsule 0 11/2/2023    sertraline (ZOLOFT) 100 MG tablet Take 1 tablet by mouth Daily.   11/2/2023    simethicone (MYLICON) 80 MG chewable tablet Chew 1 tablet by mouth 4 (Four) Times a Day As Needed for Flatulence. 100 tablet 0 Past Week    sucralfate (CARAFATE) 1 g tablet Take 1 tablet by mouth Every 12 (Twelve) Hours.   11/2/2023    loperamide (IMODIUM) 2 MG capsule Take 1 capsule by mouth 4 (Four) Times a Day As Needed for Diarrhea. 120 capsule 0     mupirocin (BACTROBAN) 2 % ointment APPLY TOPICALLY TWICE DAILY TO WOUND          Current Meds:   Current Facility-Administered Medications   Medication Dose  Route Frequency Provider Last Rate Last Admin    acetaminophen (TYLENOL) tablet 650 mg  650 mg Oral Q4H PRN Jaimee Macias MD        [Held by provider] apixaban (ELIQUIS) tablet 5 mg  5 mg Oral BID Jaimee Macias MD        sennosides-docusate (PERICOLACE) 8.6-50 MG per tablet 2 tablet  2 tablet Oral BID Jaimee Macias MD        And    polyethylene glycol (MIRALAX) packet 17 g  17 g Oral Daily PRN Jaimee Macias MD        And    bisacodyl (DULCOLAX) EC tablet 5 mg  5 mg Oral Daily PRN Jaieme Macias MD        And    bisacodyl (DULCOLAX) suppository 10 mg  10 mg Rectal Daily PRN Jaimee Macias MD        [Held by provider] clopidogrel (PLAVIX) tablet 75 mg  75 mg Oral Daily Jaimee Macias MD        famotidine (PEPCID) tablet 20 mg  20 mg Oral Daily Jaimee Macias MD   20 mg at 11/03/23 1002    latanoprost (XALATAN) 0.005 % ophthalmic solution 1 drop  1 drop Both Eyes Nightly Jaimee Macias MD   1 drop at 11/03/23 0151    melatonin tablet 3 mg  3 mg Oral Nightly PRN Jaimee Macias MD        metoprolol succinate XL (TOPROL-XL) 24 hr tablet 25 mg  25 mg Oral Daily Jaimee Macias MD   25 mg at 11/03/23 1002    Momelotinib Dihydrochloride (OJJAARA) 200 mg  200 mg Oral Daily Jaimee Macias MD   200 mg at 11/03/23 1002    ondansetron (ZOFRAN) tablet 4 mg  4 mg Oral Q6H PRN Jaimee Macias MD        Or    ondansetron (ZOFRAN) injection 4 mg  4 mg Intravenous Q6H PRN Jaimee Macias MD        saccharomyces boulardii (FLORASTOR) capsule 250 mg  250 mg Oral BID Jaimee Macias MD   250 mg at 11/03/23 1002    sertraline (ZOLOFT) tablet 100 mg  100 mg Oral Daily Jaimee Macias MD   100 mg at 11/03/23 1002    sodium chloride 0.9 % flush 10 mL  10 mL Intravenous PRN Tommy Clemente MD        sodium chloride 0.9 % infusion  75 mL/hr Intravenous Continuous Jaimee Macias MD 75 mL/hr at 11/03/23 0002 75  "mL/hr at 11/03/23 0002       Allergies:  Allergies   Allergen Reactions    Aspirin Unknown - Low Severity    Oxycodone Unknown - Low Severity    Hydroxyurea Other (See Comments)     Her hemoglobin drop and was stop in February 2022 and start taking Jakafi    Diphenhydramine Hcl Anxiety and Unknown (See Comments)     Benadryl IVP       Social History:   Social History     Socioeconomic History    Marital status:    Tobacco Use    Smoking status: Former     Packs/day: 1.00     Years: 20.00     Additional pack years: 0.00     Total pack years: 20.00     Types: Cigarettes     Passive exposure: Past    Smokeless tobacco: Never    Tobacco comments:     30  years ago   Vaping Use    Vaping Use: Never used   Substance and Sexual Activity    Alcohol use: Yes     Comment: \"rare\"    Drug use: Never    Sexual activity: Defer        Family History:  Family History   Problem Relation Age of Onset    Ovarian cancer Mother     Lung cancer Father     Lung cancer Sister     Malig Hyperthermia Neg Hx         Review of Systems: as per HPI, in addition:    General:      Complains of weakness / fatigue,                       No fevers / chills                       no weight loss  HEENT:       no dysphagia / odynophagia  Neck:           normal range of motion, no swelling  Respiratory: no cough / congestion                      No shortness of air                       No wheezing  CV:              No chest pain                       No palpitations  Abdomen/GI: no nausea / vomiting                      No diarrhea / constipation                      No abdominal pain  :             no dysuria / urinary frequency                       No urgency, normal output  Endocrine:   no polyuria / polydipsia,                      No heat or cold intolerance  Skin:           no rashes or skin breakdown   Vascular:   No edema                     No claudication  Psych:        no depression/ anxiety  Neuro:        no focal weakness, no " seizures  Musculoskeletal: no joint pain or deformities      Physical Exam:  Vitals:   Temp (24hrs), Av.1 °F (36.7 °C), Min:97.9 °F (36.6 °C), Max:98.2 °F (36.8 °C)    /46 (BP Location: Left arm, Patient Position: Lying)   Pulse 93   Temp 98.2 °F (36.8 °C) (Oral)   Resp 18   Wt 63 kg (138 lb 14.2 oz)   SpO2 100%   BMI 24.01 kg/m²   Intake/Output:     Intake/Output Summary (Last 24 hours) at 11/3/2023 1242  Last data filed at 2023 1946  Gross per 24 hour   Intake 500 ml   Output --   Net 500 ml        Wt Readings from Last 1 Encounters:   23 0556 63 kg (138 lb 14.2 oz)       Exam:    General Appearance:  Awake, alert, oriented x3, no acute distress  Chronically ill-appearing   Head and Face:  Normocephalic, atraumatic, mucus membranes moist, oropharynx clear   Eyes:  No icterus, pupils equal round and reactive to light, extraocular movements intact    ENMT: Moist mucosa, tongue symmetric    Neck: Supple  no jugular venous distention  no thyromegaly   Pulmonary:  Respiratory effort: Normal  Auscultation of lungs: Clear bilaterally  No wheezes  No rhonchi  Good air movement, good expansion   Chest wall:  No tenderness or deformity   Cardiovascular:  Auscultation of the heart: Normal rhythm, no murmurs  No edema of bilateral lower extremities   Abdomen:  Abdomen: soft, non-tender, normal bowel sounds all four quadrants, no masses   Liver and spleen: no hepatosplenomegaly   Musculoskeletal: Digits and nails: normal  Normal range of motion  No joint swelling or gross deformities    Skin: Skin inspection: color normal, no visible rashes or lesions  Skin palpation: texture, turgor normal, no palpable lesions   Lymphatic:  no cervical lymphadenopathy    Psychiatric: Judgement and insight: normal  Orientation to person place and time: normal  Mood and affect: normal       DATA:  Radiology and Labs:  The following labs independently reviewed by me, additional AM labs ordered  Old records  independently reviewed showing normal baseline creatinine  The following radiologic studies independently viewed by me, findings CT abdomen and pelvis showed new lingular lung mass, no hydronephrosis  Interval notes, chart personally reviewed by me.  I have reviewed and summarized old records as detailed above  Plan of care discussed with patient, family  New problems include KARLI      Risk/ complexity of medical care/ medical decision making: High complexity, new KARLI  Chronic illness with severe exacerbation or progression: Lung disease      Labs:   Recent Results (from the past 24 hour(s))   Comprehensive Metabolic Panel    Collection Time: 11/02/23  4:31 PM    Specimen: Blood   Result Value Ref Range    Glucose 148 (H) 65 - 99 mg/dL    BUN 55 (H) 8 - 23 mg/dL    Creatinine 2.96 (H) 0.57 - 1.00 mg/dL    Sodium 132 (L) 136 - 145 mmol/L    Potassium 4.2 3.5 - 5.2 mmol/L    Chloride 98 98 - 107 mmol/L    CO2 20.0 (L) 22.0 - 29.0 mmol/L    Calcium 8.5 (L) 8.6 - 10.5 mg/dL    Total Protein 6.1 6.0 - 8.5 g/dL    Albumin 3.7 3.5 - 5.2 g/dL    ALT (SGPT) 17 1 - 33 U/L    AST (SGOT) 22 1 - 32 U/L    Alkaline Phosphatase 115 39 - 117 U/L    Total Bilirubin 0.5 0.0 - 1.2 mg/dL    Globulin 2.4 gm/dL    A/G Ratio 1.5 g/dL    BUN/Creatinine Ratio 18.6 7.0 - 25.0    Anion Gap 14.0 5.0 - 15.0 mmol/L    eGFR 15.0 (L) >60.0 mL/min/1.73   Lipase    Collection Time: 11/02/23  4:31 PM    Specimen: Blood   Result Value Ref Range    Lipase 34 13 - 60 U/L   CBC Auto Differential    Collection Time: 11/02/23  4:31 PM    Specimen: Blood   Result Value Ref Range    WBC 44.77 (C) 3.40 - 10.80 10*3/mm3    RBC 2.74 (L) 3.77 - 5.28 10*6/mm3    Hemoglobin 8.1 (L) 12.0 - 15.9 g/dL    Hematocrit 24.6 (L) 34.0 - 46.6 %    MCV 89.8 79.0 - 97.0 fL    MCH 29.6 26.6 - 33.0 pg    MCHC 32.9 31.5 - 35.7 g/dL    RDW 18.0 (H) 12.3 - 15.4 %    RDW-SD 57.0 (H) 37.0 - 54.0 fl    MPV 10.2 6.0 - 12.0 fL    Platelets 522 (H) 140 - 450 10*3/mm3   Manual  Differential    Collection Time: 11/02/23  4:31 PM    Specimen: Blood   Result Value Ref Range    Neutrophil % 87.2 (H) 42.7 - 76.0 %    Lymphocyte % 6.4 (L) 19.6 - 45.3 %    Monocyte % 3.2 (L) 5.0 - 12.0 %    Eosinophil % 1.1 0.3 - 6.2 %    Basophil % 2.1 (H) 0.0 - 1.5 %    Neutrophils Absolute 39.04 (H) 1.70 - 7.00 10*3/mm3    Lymphocytes Absolute 2.87 0.70 - 3.10 10*3/mm3    Monocytes Absolute 1.43 (H) 0.10 - 0.90 10*3/mm3    Eosinophils Absolute 0.49 (H) 0.00 - 0.40 10*3/mm3    Basophils Absolute 0.94 (H) 0.00 - 0.20 10*3/mm3    Anisocytosis Mod/2+ None Seen    [place] Cells Slight/1+ None Seen    Dacrocytes Slight/1+ None Seen    Macrocytes Slight/1+ None Seen    Poikilocytes Mod/2+ None Seen    Stomatocytes Mod/2+ None Seen    WBC Morphology Normal Normal    Platelet Morphology Normal Normal   Basic Metabolic Panel    Collection Time: 11/03/23  5:40 AM    Specimen: Blood   Result Value Ref Range    Glucose 93 65 - 99 mg/dL    BUN 53 (H) 8 - 23 mg/dL    Creatinine 2.44 (H) 0.57 - 1.00 mg/dL    Sodium 135 (L) 136 - 145 mmol/L    Potassium 3.9 3.5 - 5.2 mmol/L    Chloride 101 98 - 107 mmol/L    CO2 20.2 (L) 22.0 - 29.0 mmol/L    Calcium 8.4 (L) 8.6 - 10.5 mg/dL    BUN/Creatinine Ratio 21.7 7.0 - 25.0    Anion Gap 13.8 5.0 - 15.0 mmol/L    eGFR 19.0 (L) >60.0 mL/min/1.73   CBC (No Diff)    Collection Time: 11/03/23  5:40 AM    Specimen: Blood   Result Value Ref Range    WBC 39.23 (C) 3.40 - 10.80 10*3/mm3    RBC 2.61 (L) 3.77 - 5.28 10*6/mm3    Hemoglobin 7.6 (L) 12.0 - 15.9 g/dL    Hematocrit 24.1 (L) 34.0 - 46.6 %    MCV 92.3 79.0 - 97.0 fL    MCH 29.1 26.6 - 33.0 pg    MCHC 31.5 31.5 - 35.7 g/dL    RDW 17.8 (H) 12.3 - 15.4 %    RDW-SD 59.4 (H) 37.0 - 54.0 fl    MPV 10.3 6.0 - 12.0 fL    Platelets 413 140 - 450 10*3/mm3       Radiology:  Imaging Results (Last 24 Hours)       Procedure Component Value Units Date/Time    CT Abdomen Pelvis Without Contrast [[id number]] Collected: 11/02/23 1912     Updated: 11/02/23  1925    Narrative:      CT ABDOMEN PELVIS WO CONTRAST-     INDICATIONS: Abdominal pain     TECHNIQUE: Radiation dose reduction techniques were utilized, including  automated exposure control and exposure modulation based on body size.  Unenhanced ABDOMEN AND PELVIS CT     COMPARISON: 8/13/2023     FINDINGS:     The gallbladder is surgically absent.     Mild nonspecific nodular thickening of the adrenal glands is seen.     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 A small arterial calcification is  apparent at the left renal hilum. No hydronephrosis.     Otherwise unremarkable unenhanced appearance of the liver, spleen,  adrenal glands, pancreas, kidneys, bladder.     No bowel obstruction or abnormal bowel thickening is identified. Colonic  diverticula are seen that do not appear inflamed. Minimal hiatal hernia  is seen. Pelvic floor relaxation/rectocele is apparent, correlate with  clinical exam.     No free intraperitoneal gas or free fluid. Umbilical hernia of fat is  noted.     Scattered small mesenteric and para-aortic lymph nodes are seen that are  not significant by size criteria.     Abdominal aorta is not aneurysmal. Aortic and other arterial  calcifications are present.     The lung bases show a new pleural-based density in the lingula, 1.3 x  3.3 cm on axial image 8, that could be rounded atelectasis or  potentially a focus of infection or even neoplasm, correlate clinically,  PET/CT correlation can be obtained as indicated, in addition to  continued CT follow-up. Reticulonodular opacities in the lower lobes may  be inflammatory/infectious in etiology, follow-up recommended.     Degenerative changes are seen in the spine. Stable sclerotic foci in the  left aspect of the sacrum, right femur, nonspecific, may represent bone  island. No acute fracture is identified.             Impression:            1. A new indeterminate density in the lingula, as described.  Reticulonodular pulmonary opacities in the  bilateral lower lobes.  Follow-up evaluation advised.     2. Colonic diverticulosis. No acute inflammatory process of bowel is  identified. Follow-up as indications persist.     3. No urolithiasis or hydronephrosis. Small arterial calcification of  the left renal hilum.     This report was finalized on 11/2/2023 7:22 PM by Dr. Reilly Ritter M.D on Workstation: [id number]                  ASSESSMENT:   Acute kidney injury, new with normal baseline renal function, likely prerenal  Chronic diastolic CHF  Nausea vomiting and dehydration  Left lingular lung mass  Myeloproliferative disorder  Chronic leukocytosis  Atrial fibrillation  Diabetes mellitus      DISCUSSION/PLAN:   Renal function seems to be improving overnight with IV fluids  Continue to hold diuretics and cautious hydration  Monitor volume status closely with underlying CHF  CT showed no hydronephrosis  Will check urine studies and follow-up a.m. labs    Continue to monitor electrolytes and volume closely, avoid IV contrast and nephrotoxic medications     I appreciate the consult request.  Please send me a secure chat message with any nonurgent questions regarding patient care.  For any urgent patient care issues please call my office number below.      Darrell Hanley MD  [facility]  Office phone number: [phone]  Answering service phone number: [phone]      11/3/2023        Dictation via Dragon dictation software

## 2023-11-03 NOTE — THERAPY EVALUATION
Patient Name: Catherine Boyer  : 1940    MRN: [id number]                              Today's Date: 11/3/2023       Admit Date: 2023    Visit Dx:     ICD-10-CM ICD-9-CM   1. Acute renal failure, unspecified acute renal failure type  N17.9 584.9   2. Acute abdominal pain  R10.9 789.00     338.19   3. Nausea vomiting and diarrhea  R11.2 787.91    R19.7 787.01     Patient Active Problem List   Diagnosis    Acute on chronic diastolic heart failure    CAD (coronary artery disease)    Nonbacterial thrombotic endocarditis    Paroxysmal atrial fibrillation    Myeloproliferative disorder    Microcytic anemia    Type 2 diabetes mellitus with circulatory disorder, without long-term current use of insulin    GERD (gastroesophageal reflux disease)    Hypertension    Personal history of transient ischemic attack (TIA), 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 deficits    Porcelain gallbladder    Breast cancer    Atherosclerosis of abdominal aorta    APC (atrial premature contractions)    Moderate malnutrition    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    Elevated troponin    TIA (transient ischemic attack)    PUD (peptic ulcer disease)    Anemia    Tachycardia    Myelofibrosis    Normocytic anemia    Acute kidney injury     Past Medical History:   Diagnosis Date    Atherosclerosis of abdominal aorta 2023    Atrial fibrillation     Breast cancer     CAD (coronary artery disease)     NSTEMI 2022: 90% ostial LAD, 99% D1, 70% mid-distal LAD (medical therapy). She received two stents (2.5x18 and 2.5x26mm [place] LEFTY) but I don't know which one went to which lesion.    Carotid atherosclerosis     Cholecystitis 2022    Added automatically from request for surgery [id number]    Chronic diastolic (congestive) heart failure     COVID 10/29/2022    GERD (gastroesophageal reflux disease)     Glaucoma     History of cataract     Hypertension     Microcytic anemia     per Dr. oleg pate office  note 22-dd    Myeloproliferative  disorder     JAK2 positive    Nonbacterial thrombotic endocarditis     6/2021: 4x5mm vegetation on the ventricular surface of the anterior MV, negative blood cultures    Porcelain gallbladder     PUD (peptic ulcer disease)     TIA (transient ischemic attack)     Type 2 diabetes mellitus     Type 2 diabetes mellitus with circulatory disorder, without long-term current use of insulin 07/14/2022    Upper GI bleed      Past Surgical History:   Procedure Laterality Date    BREAST LUMPECTOMY  1999    CARDIAC CATHETERIZATION N/A 09/02/2022    Procedure: Coronary angiography;  Surgeon: Guero Verde MD;  Location: [facility] CATH INVASIVE LOCATION;  Service: Cardiovascular;  Laterality: N/A;    CARDIAC CATHETERIZATION N/A 09/02/2022    Procedure: Stent LEFTY coronary;  Surgeon: Guero Verde MD;  Location: [facility] CATH INVASIVE LOCATION;  Service: Cardiovascular;  Laterality: N/A;    CATARACT EXTRACTION  2011    CHOLECYSTECTOMY      CHOLECYSTECTOMY WITH INTRAOPERATIVE CHOLANGIOGRAM N/A 11/28/2022    Procedure: CHOLECYSTECTOMY LAPAROSCOPIC INTRAOPERATIVE CHOLANGIOGRAM;  Surgeon: Dani Grover Jr., MD;  Location: [facility] MAIN OR;  Service: General;  Laterality: N/A;    COLONOSCOPY N/A 02/09/2023    Procedure: COLONOSCOPY TO CECUM & T.I.;  Surgeon: Guero Ferris MD;  Location: [facility] ENDOSCOPY;  Service: Gastroenterology;  Laterality: N/A;  PRE- MELENA, GI BLEED  POST- DIVERTICULOSIS, INT HEMORRHOIDS    ENDOSCOPY N/A 01/25/2023    Procedure: ESOPHAGOGASTRODUODENOSCOPY WITH BIOPSIES;  Surgeon: Charles Diaz MD;  Location: [facility] ENDOSCOPY;  Service: Gastroenterology;  Laterality: N/A;  pre: abd pain, nausea  post: hiatal hernia, mild gastritis, sloughing of the esophagus    ENTEROSCOPY SMALL BOWEL N/A 02/09/2023    Procedure: ENTEROSCOPY SMALL BOWEL;  Surgeon: Guero Ferris MD;  Location: [facility] ENDOSCOPY;  Service: Gastroenterology;  Laterality: N/A;  PRE- MELENA, GI BLEED  POST- HIATAL HERNIA    JOINT REPLACEMENT       UPPER GASTROINTESTINAL ENDOSCOPY        General Information       Row Name 11/03/23 1500          OT Time and Intention    Document Type evaluation  -     Mode of Treatment occupational therapy  -       Row Name 11/03/23 1500          General Information    Patient Profile Reviewed yes  -     Prior Level of Function independent:;ADL's;all household mobility  use of rollator  -     Existing Precautions/Restrictions fall;oxygen therapy device and L/min  4L O2 via NC  -     Barriers to Rehab medically complex  -       Row Name 11/03/23 1500          Living Environment    People in Home facility resident  [facility]  -       Row Name 11/03/23 1500          Home Main Entrance    Number of Stairs, Main Entrance none  -       Row Name 11/03/23 1500          Cognition    Orientation Status (Cognition) oriented x 4  -       Row Name 11/03/23 1500          Safety Issues, Functional Mobility    Impairments Affecting Function (Mobility) balance;endurance/activity tolerance;strength;pain  -               User Key  (r) = Recorded By, (t) = Taken By, (c) = Cosigned By      Initials Name Provider Type     Esperanza Lozada OT Occupational Therapist                     Mobility/ADL's       Row Name 11/03/23 1501          Bed Mobility    Bed Mobility supine-sit;sit-supine  -     Supine-Sit [place] (Bed Mobility) contact guard  -     Sit-Supine [place] (Bed Mobility) contact guard  -     Assistive Device (Bed Mobility) bed rails;head of bed elevated  -       Row Name 11/03/23 1501          Sit-Stand Transfer    Sit-Stand [place] (Transfers) standby assist  -     Assistive Device (Sit-Stand Transfers) walker, front-wheeled  -       Row Name 11/03/23 1501          Functional Mobility    Functional Mobility- Ind. Level contact guard assist;standby assist  -     Functional Mobility- Device walker, front-wheeled  -     Functional Mobility- Comment fxl ambulation using RW with SBA-CGA  around room, in/out of bathroom to simulate household mobility  -[facility] Name 11/03/23 1501          Activities of Daily Living    BADL Assessment/Intervention grooming;lower body dressing;toileting  -JW       Row Name 11/03/23 1501          Toileting Assessment/Training    Comment, (Toileting) using purewick d/t urinary incontinence  -JW       Row Name 11/03/23 1501          Grooming Assessment/Training    [place] Level (Grooming) grooming skills;set up;supervision  -     Position (Grooming) sink side;supported standing;unsupported standing  -[facility] Name 11/03/23 1501          Lower Body Dressing Assessment/Training    [place] Level (Lower Body Dressing) moderate assist (50% patient effort);don;doff;socks  -     Position (Lower Body Dressing) edge of bed sitting  -     Comment, (Lower Body Dressing) dex fxl reach for LB ADLs  -               User Key  (r) = Recorded By, (t) = Taken By, (c) = Cosigned By      Initials Name Provider Type     Esperanza Lozada OT Occupational Therapist                   Obj/Interventions       Row Name 11/03/23 1503          Vision Assessment/Intervention    Visual Impairment/Limitations WNL  -JW       Row Name 11/03/23 1503          Range of Motion Comprehensive    General Range of Motion bilateral upper extremity ROM WNL  -JW       Row Name 11/03/23 1503          Strength Comprehensive (MMT)    Comment, General Manual Muscle Testing (MMT) Assessment generalized weakness  -JW       Row Name 11/03/23 1503          Motor Skills    Motor Skills functional endurance  -     Functional Endurance fair-. Fatigues after short fxl amb to the bathroom  -[facility] 11/03/23 1503          Balance    Static Standing Balance standby assist  -     Dynamic Standing Balance contact guard;standby assist  -     Position/Device Used, Standing Balance walker, front-wheeled  -     Balance Interventions occupation based/functional task;sitting;standing  -                User Key  (r) = Recorded By, (t) = Taken By, (c) = Cosigned By      Initials Name Provider Type    JW Esperanza Lozada OT Occupational Therapist                   Goals/Plan       Row Name 11/03/23 1508          Transfer Goal 1 (OT)    Activity/Assistive Device (Transfer Goal 1, OT) transfers, all;bed-to-chair/chair-to-bed;toilet  -     [place] Level/Cues Needed (Transfer Goal 1, OT) supervision required  -     Time Frame (Transfer Goal 1, OT) short term goal (STG);2 weeks  -     Strategies/Barriers (Transfers Goal 1, OT) using AD as needed  -     Progress/Outcome (Transfer Goal 1, OT) goal ongoing  -       Row Name 11/03/23 1508          Bathing Goal 1 (OT)    Activity/Device (Bathing Goal 1, OT) bathing skills, all  -     [place] Level/Cues Needed (Bathing Goal 1, OT) minimum assist (75% or more patient effort)  -     Time Frame (Bathing Goal 1, OT) short term goal (STG);2 weeks  -     Strategies/Barriers (Bathing Goal 1, OT) sponge bath at EOB using AD as needed  -     Progress/Outcomes (Bathing Goal 1, OT) goal ongoing  -       Row Name 11/03/23 1508          Toileting Goal 1 (OT)    Activity/Device (Toileting Goal 1, OT) toileting skills, all  -     [place] Level/Cues Needed (Toileting Goal 1, OT) supervision required  -     Time Frame (Toileting Goal 1, OT) short term goal (STG);2 weeks  -     Progress/Outcome (Toileting Goal 1, OT) goal ongoing  -       Row Name 11/03/23 1508          Problem Specific Goal 1 (OT)    Problem Specific Goal 1 (OT) Pt will complete ADLs at the sink in sitting/standing for ~15 mins to increase activity tolerance for functional ax  -     Time Frame (Problem Specific Goal 1, OT) short term goal (STG);2 weeks  -     Progress/Outcome (Problem Specific Goal 1, OT) goal ongoing  -       Row Name 11/03/23 1508          Therapy Assessment/Plan (OT)    Planned Therapy Interventions (OT) activity tolerance training;adaptive equipment  training;BADL retraining;functional balance retraining;occupation/activity based interventions;patient/caregiver education/training;transfer/mobility retraining;strengthening exercise  -               User Key  (r) = Recorded By, (t) = Taken By, (c) = Cosigned By      Initials Name Provider Type    Esperanza Richardson OT Occupational Therapist                   Clinical Impression       Row Name 11/03/23 150          Pain Assessment    Pretreatment Pain Rating 0/10 - no pain  -     Posttreatment Pain Rating 0/10 - no pain  -       Row Name 11/03/23 1502          Plan of Care Review    Plan of Care Reviewed With patient;daughter  -     Outcome Evaluation Pt is an 84 y/o F admitted with abdominal pain, N/V/D. Dx with KARLI. She lives at [facility] and is typically indep with ADLs and mobility with rollator. Today she presents with dec strength, endurance and activity tolerance. CGA to sit EOB and assist to don socks. She performs short fxl ambulation in/out of the bathroom with SBA-CGA using RW to simulate household mobility. She stands at the sink to complete grooming task with set-up/spv and then reports fatigue, requesting to return to supine in bed. SpO2 after xz=142% on 4L. Recommend return to [facility] with assist as needed. Pt's family asking for palliative consult- RN notified.  -       Row Name 11/03/23 150          Therapy Assessment/Plan (OT)    Rehab Potential (OT) fair, will monitor progress closely  -     Criteria for Skilled Therapeutic Interventions Met (OT) yes;skilled treatment is necessary  -     Therapy Frequency (OT) 3 times/wk  -       Row Name 11/03/23 1507          Therapy Plan Review/Discharge Plan (OT)    Anticipated Discharge Disposition (OT) home with assist;home with home health  [facility]  -       Row Name 11/03/23 150          Vital Signs    O2 Delivery Pre Treatment supplemental O2  -     O2 Delivery Intra Treatment supplemental O2  -     Post SpO2 (%) 100  -     O2 Delivery Post  Treatment supplemental O2  -JW     Post Patient Position Supine  -       Row Name 11/03/23 1504          Positioning and Restraints    Pre-Treatment Position in bed  -JW     Post Treatment Position bed  -JW     In Bed fowlers;call light within reach;encouraged to call for assist;exit alarm on;with family/caregiver  -               User Key  (r) = Recorded By, (t) = Taken By, (c) = Cosigned By      Initials Name Provider Type    Esperanza Richardson OT Occupational Therapist                   Outcome Measures       Row Name 11/03/23 1511          How much help from another is currently needed...    Putting on and taking off regular lower body clothing? 3  -JW     Bathing (including washing, rinsing, and drying) 3  -JW     Toileting (which includes using toilet bed pan or urinal) 3  -JW     Putting on and taking off regular upper body clothing 3  -JW     Taking care of personal grooming (such as brushing teeth) 3  -JW     Eating meals 4  -     AM-PAC 6 Clicks Score (OT) 19  -       Row Name 11/03/23 1141          How much help from another person do you currently need...    Turning from your back to your side while in flat bed without using bedrails? 3  -CH (r) SK (t) CH (c)     Moving from lying on back to sitting on the side of a flat bed without bedrails? 3  -CH (r) SK (t) CH (c)     Moving to and from a bed to a chair (including a wheelchair)? 3  -CH (r) SK (t) CH (c)     Standing up from a chair using your arms (e.g., wheelchair, bedside chair)? 3  -CH (r) SK (t) CH (c)     Climbing 3-5 steps with a railing? 2  -CH (r) SK (t) CH (c)     To walk in hospital room? 3  -CH (r) SK (t) CH (c)     AM-PAC 6 Clicks Score (PT) 17  -CH (r) SK (t)     Highest level of mobility 5 --> Static standing  -CH (r) SK (t)       Row Name 11/03/23 1511 11/03/23 1141       Functional Assessment    Outcome Measure Options AM-PAC 6 Clicks Daily Activity (OT)  - AM-PAC 6 Clicks Basic Mobility (PT)  -CH (r) SK (t) CH (c)               User Key  (r) = Recorded By, (t) = Taken By, (c) = Cosigned By      Initials Name Provider Type     Zeinab Joya, PT Physical Therapist    Esperanza Richardson, OT Occupational Therapist    Nessa Francois, PT Student PT Student                    Occupational Therapy Education       Title: PT OT SLP Therapies (In Progress)       Topic: Occupational Therapy (In Progress)       Point: ADL training (Done)       Description:   Instruct learner(s) on proper safety adaptation and remediation techniques during self care or transfers.   Instruct in proper use of assistive devices.                  Learning Progress Summary             Patient Acceptance, E, VU by SCARLET at 11/3/2023 1512    Comment: role of OT, plan of care, safety                         Point: Home exercise program (Not Started)       Description:   Instruct learner(s) on appropriate technique for monitoring, assisting and/or progressing therapeutic exercises/activities.                  Learner Progress:  Not documented in this visit.              Point: Precautions (Done)       Description:   Instruct learner(s) on prescribed precautions during self-care and functional transfers.                  Learning Progress Summary             Patient Acceptance, E, VU by SCARLET at 11/3/2023 1512    Comment: role of OT, plan of care, safety                         Point: Body mechanics (Done)       Description:   Instruct learner(s) on proper positioning and spine alignment during self-care, functional mobility activities and/or exercises.                  Learning Progress Summary             Patient Acceptance, E, VU by SCARLET at 11/3/2023 1512    Comment: role of OT, plan of care, safety                                         User Key       Initials Effective Dates Name Provider Type Discipline     06/10/21 -  Esperanza Lozada OT Occupational Therapist OT                  OT Recommendation and Plan  Planned Therapy Interventions (OT): activity tolerance  training, adaptive equipment training, BADL retraining, functional balance retraining, occupation/activity based interventions, patient/caregiver education/training, transfer/mobility retraining, strengthening exercise  Therapy Frequency (OT): 3 times/wk  Plan of Care Review  Plan of Care Reviewed With: patient, daughter  Outcome Evaluation: Pt is an 84 y/o F admitted with abdominal pain, N/V/D. Dx with KARLI. She lives at [facility] and is typically indep with ADLs and mobility with rollator. Today she presents with dec strength, endurance and activity tolerance. CGA to sit EOB and assist to don socks. She performs short fxl ambulation in/out of the bathroom with SBA-CGA using RW to simulate household mobility. She stands at the sink to complete grooming task with set-up/spv and then reports fatigue, requesting to return to supine in bed. SpO2 after sk=445% on 4L. Recommend return to snf with assist as needed. Pt's family asking for palliative consult- RN notified.     Time Calculation:   Evaluation Complexity (OT)  Review Occupational Profile/Medical/Therapy History Complexity: expanded/moderate complexity  Assessment, Occupational Performance/Identification of Deficit Complexity: 3-5 performance deficits  Clinical Decision Making Complexity (OT): detailed assessment/moderate complexity  Overall Complexity of Evaluation (OT): moderate complexity     Time Calculation- OT       Row Name 11/03/23 1512             Time Calculation- OT    OT Start Time 1346  -      OT Stop Time 1405  -      OT Time Calculation (min) 19 min  -      Total Timed Code Minutes- OT 10 minute(s)  -      OT Received On 11/03/23  -      OT - Next Appointment 11/06/23  -      OT Goal Re-Cert Due Date 11/17/23  -         Timed Charges    40154 - OT Self Care/Mgmt Minutes 10  -JW         Untimed Charges    OT Eval/Re-eval Minutes 9  -JW         Total Minutes    Timed Charges Total Minutes 10  -JW      Untimed Charges Total Minutes 9  -JW        Total Minutes 19  -JW                User Key  (r) = Recorded By, (t) = Taken By, (c) = Cosigned By      Initials Name Provider Type    Esperanza Richardson OT Occupational Therapist                  Therapy Charges for Today       Code Description Service Date Service Provider Modifiers Qty    [id number]  OT SELF CARE/MGMT/TRAIN EA 15 MIN 11/3/2023 Esperanza Lozada OT GO 1    [id number]  OT EVAL MOD COMPLEXITY 2 11/3/2023 Esperanza Lozada OT GO 1                 Esperanza Lozada OT  11/3/2023

## 2023-11-03 NOTE — PLAN OF CARE
Goal Outcome Evaluation:  Plan of Care Reviewed With: patient        Progress: improving     Pt was admitted from the ER, alert & oriented x4 with stable vital signs. All the consults has been called. Medicated as ordered & tolerated well, I will continue to monitor.

## 2023-11-03 NOTE — PLAN OF CARE
Problem: Adult Inpatient Plan of Care  Goal: Plan of Care Review  Outcome: Ongoing, Progressing  Flowsheets (Taken 11/3/2023 1816)  Progress: improving  Plan of Care Reviewed With: patient  Goal: Patient-Specific Goal (Individualized)  Outcome: Ongoing, Progressing  Goal: Absence of Hospital-Acquired Illness or Injury  Outcome: Ongoing, Progressing  Intervention: Identify and Manage Fall Risk  Recent Flowsheet Documentation  Taken 11/3/2023 1800 by Felix Owens RN  Safety Promotion/Fall Prevention:   activity supervised   assistive device/personal items within reach   clutter free environment maintained   toileting scheduled   safety round/check completed   room organization consistent   nonskid shoes/slippers when out of bed   fall prevention program maintained  Taken 11/3/2023 1600 by Felix Owens RN  Safety Promotion/Fall Prevention:   activity supervised   assistive device/personal items within reach   clutter free environment maintained   toileting scheduled   safety round/check completed   room organization consistent   nonskid shoes/slippers when out of bed   fall prevention program maintained  Taken 11/3/2023 1442 by Felix Owens RN  Safety Promotion/Fall Prevention:   activity supervised   assistive device/personal items within reach   clutter free environment maintained   toileting scheduled   room organization consistent   safety round/check completed   nonskid shoes/slippers when out of bed   fall prevention program maintained  Taken 11/3/2023 1225 by Felix Owens RN  Safety Promotion/Fall Prevention:   activity supervised   assistive device/personal items within reach   clutter free environment maintained   toileting scheduled   safety round/check completed   room organization consistent   nonskid shoes/slippers when out of bed   fall prevention program maintained  Taken 11/3/2023 1002 by Felix Oewns, RN  Safety Promotion/Fall Prevention:   activity supervised   assistive  device/personal items within reach   clutter free environment maintained   safety round/check completed   room organization consistent   toileting scheduled   nonskid shoes/slippers when out of bed   fall prevention program maintained  Taken 11/3/2023 0805 by Felix Owens RN  Safety Promotion/Fall Prevention:   activity supervised   clutter free environment maintained   assistive device/personal items within reach   toileting scheduled   safety round/check completed   room organization consistent   nonskid shoes/slippers when out of bed   fall prevention program maintained  Intervention: Prevent Skin Injury  Recent Flowsheet Documentation  Taken 11/3/2023 1800 by Felix Owens RN  Body Position: sitting up in bed  Taken 11/3/2023 1600 by Felix Owens RN  Body Position:   position changed independently   sitting up in bed  Taken 11/3/2023 1442 by Felix Owens RN  Body Position:   position changed independently   sitting up in bed  Taken 11/3/2023 1225 by Felix Owens RN  Body Position:   position changed independently   sitting up in bed  Taken 11/3/2023 1002 by Felix Owens RN  Body Position:   position changed independently   sitting up in bed  Taken 11/3/2023 0805 by Felix Owens RN  Body Position:   position changed independently   sitting up in bed  Intervention: Prevent Infection  Recent Flowsheet Documentation  Taken 11/3/2023 1800 by Felix Owens RN  Infection Prevention:   hand hygiene promoted   rest/sleep promoted  Taken 11/3/2023 1600 by Felix Owens RN  Infection Prevention:   hand hygiene promoted   rest/sleep promoted  Taken 11/3/2023 1442 by Felix Owens RN  Infection Prevention:   hand hygiene promoted   rest/sleep promoted  Taken 11/3/2023 1225 by Felix Owens RN  Infection Prevention:   hand hygiene promoted   rest/sleep promoted  Taken 11/3/2023 1002 by Felix Owens RN  Infection Prevention:   hand hygiene promoted   rest/sleep promoted  Taken  11/3/2023 0805 by Felix Owens RN  Infection Prevention:   hand hygiene promoted   rest/sleep promoted  Goal: Optimal Comfort and Wellbeing  Outcome: Ongoing, Progressing  Goal: Readiness for Transition of Care  Outcome: Ongoing, Progressing     Problem: Adjustment to Illness (Sepsis/Septic Shock)  Goal: Optimal Coping  Outcome: Ongoing, Progressing     Problem: Bleeding (Sepsis/Septic Shock)  Goal: Absence of Bleeding  Outcome: Ongoing, Progressing     Problem: Glycemic Control Impaired (Sepsis/Septic Shock)  Goal: Blood Glucose Level Within Desired Range  Outcome: Ongoing, Progressing     Problem: Infection Progression (Sepsis/Septic Shock)  Goal: Absence of Infection Signs and Symptoms  Outcome: Ongoing, Progressing  Intervention: Initiate Sepsis Management  Recent Flowsheet Documentation  Taken 11/3/2023 1800 by Felix Owens RN  Infection Prevention:   hand hygiene promoted   rest/sleep promoted  Isolation Precautions:   contact   spore   precautions maintained  Taken 11/3/2023 1600 by Felix Owens RN  Infection Prevention:   hand hygiene promoted   rest/sleep promoted  Isolation Precautions:   contact   spore   precautions maintained  Taken 11/3/2023 1442 by Felix Owens RN  Infection Prevention:   hand hygiene promoted   rest/sleep promoted  Isolation Precautions:   contact   spore   precautions maintained  Taken 11/3/2023 1225 by Felix Owens RN  Infection Prevention:   hand hygiene promoted   rest/sleep promoted  Isolation Precautions:   contact   spore   precautions maintained  Taken 11/3/2023 1002 by Felix Owens RN  Infection Prevention:   hand hygiene promoted   rest/sleep promoted  Isolation Precautions:   contact   spore   precautions maintained  Taken 11/3/2023 0805 by Felix Owens RN  Infection Prevention:   hand hygiene promoted   rest/sleep promoted     Problem: Nutrition Impaired (Sepsis/Septic Shock)  Goal: Optimal Nutrition Intake  Outcome: Ongoing, Progressing      Problem: Fall Injury Risk  Goal: Absence of Fall and Fall-Related Injury  Outcome: Ongoing, Progressing  Intervention: Identify and Manage Contributors  Recent Flowsheet Documentation  Taken 11/3/2023 1800 by Felix Owens RN  Medication Review/Management: medications reviewed  Taken 11/3/2023 1600 by Felix Owens RN  Medication Review/Management: medications reviewed  Taken 11/3/2023 1442 by Felix Owens RN  Medication Review/Management: medications reviewed  Taken 11/3/2023 1225 by Felix Owens RN  Medication Review/Management: medications reviewed  Taken 11/3/2023 1002 by Felix Owens RN  Medication Review/Management: medications reviewed  Taken 11/3/2023 0805 by Felix Owens RN  Medication Review/Management: medications reviewed  Intervention: Promote Injury-Free Environment  Recent Flowsheet Documentation  Taken 11/3/2023 1800 by Felix Owens RN  Safety Promotion/Fall Prevention:   activity supervised   assistive device/personal items within reach   clutter free environment maintained   toileting scheduled   safety round/check completed   room organization consistent   nonskid shoes/slippers when out of bed   fall prevention program maintained  Taken 11/3/2023 1600 by Felix Owens RN  Safety Promotion/Fall Prevention:   activity supervised   assistive device/personal items within reach   clutter free environment maintained   toileting scheduled   safety round/check completed   room organization consistent   nonskid shoes/slippers when out of bed   fall prevention program maintained  Taken 11/3/2023 1442 by Felix Owens RN  Safety Promotion/Fall Prevention:   activity supervised   assistive device/personal items within reach   clutter free environment maintained   toileting scheduled   room organization consistent   safety round/check completed   nonskid shoes/slippers when out of bed   fall prevention program maintained  Taken 11/3/2023 1225 by Felxi Owens RN  Safety  Promotion/Fall Prevention:   activity supervised   assistive device/personal items within reach   clutter free environment maintained   toileting scheduled   safety round/check completed   room organization consistent   nonskid shoes/slippers when out of bed   fall prevention program maintained  Taken 11/3/2023 1002 by Felix Owens RN  Safety Promotion/Fall Prevention:   activity supervised   assistive device/personal items within reach   clutter free environment maintained   safety round/check completed   room organization consistent   toileting scheduled   nonskid shoes/slippers when out of bed   fall prevention program maintained  Taken 11/3/2023 0805 by Felix Owens RN  Safety Promotion/Fall Prevention:   activity supervised   clutter free environment maintained   assistive device/personal items within reach   toileting scheduled   safety round/check completed   room organization consistent   nonskid shoes/slippers when out of bed   fall prevention program maintained     Problem: Fluid and Electrolyte Imbalance (Acute Kidney Injury/Impairment)  Goal: Fluid and Electrolyte Balance  Outcome: Ongoing, Progressing     Problem: Oral Intake Inadequate (Acute Kidney Injury/Impairment)  Goal: Optimal Nutrition Intake  Outcome: Ongoing, Progressing     Problem: Renal Function Impairment (Acute Kidney Injury/Impairment)  Goal: Effective Renal Function  Outcome: Ongoing, Progressing  Intervention: Monitor and Support Renal Function  Recent Flowsheet Documentation  Taken 11/3/2023 1800 by Felix Owens RN  Medication Review/Management: medications reviewed  Taken 11/3/2023 1600 by Felix Owens RN  Medication Review/Management: medications reviewed  Taken 11/3/2023 1442 by Felix Owens RN  Medication Review/Management: medications reviewed  Taken 11/3/2023 1225 by Felix Owens RN  Medication Review/Management: medications reviewed  Taken 11/3/2023 1002 by Felix Owens RN  Medication  Review/Management: medications reviewed  Taken 11/3/2023 0805 by Felix Owens RN  Medication Review/Management: medications reviewed     Problem: Skin Injury Risk Increased  Goal: Skin Health and Integrity  Outcome: Ongoing, Progressing  Intervention: Optimize Skin Protection  Recent Flowsheet Documentation  Taken 11/3/2023 1800 by Felix Owens RN  Head of Bed (HOB) Positioning: HOB at 30 degrees  Taken 11/3/2023 1600 by Felix Owens RN  Head of Bed (HOB) Positioning: HOB at 30-45 degrees  Taken 11/3/2023 1442 by Felix Owens RN  Head of Bed (HOB) Positioning: HOB at 30-45 degrees  Taken 11/3/2023 1225 by Felix Owens RN  Head of Bed (HOB) Positioning: HOB at 30 degrees  Taken 11/3/2023 1002 by Felix Owens RN  Head of Bed (HOB) Positioning: HOB at 30 degrees  Taken 11/3/2023 0805 by Felix Owens RN  Head of Bed (HOB) Positioning: HOB at 30-45 degrees   Goal Outcome Evaluation:  Plan of Care Reviewed With: patient        Progress: improving

## 2023-11-03 NOTE — CONSULTS
Gastroenterology   Initial Inpatient Consult Note    Referring Provider: Dr. Macias    Reason for Consultation: Anemia, nausea, vomiting, and diarrhea    Subjective     History of present illness:      83 y.o. F patient of Dr. Annika Guzman who we are asked to see for anemia, nausea, vomiting, and diarrhea.  she has a significant past medical history of   CAD (stents 2/2022) on Plavix, Afib on Eliquis, chronic diastolic HF, myeloproliferative disorder/anemia following with Dr. Reinoso of Hematology on Jakafi (chronically elevated WBC) who presents with abdominal pain and black stools  who presented to the ER on 11/2/2023 with concerns of worsening malaise and decreased oral intake. No ill-contact or international travel prior to symptom onset.  Completed antibiotic regimen approx 14 days ago for UTI.     Denies upper GI concerns including heartburn, nausea, vomiting, or dysphagia.  Reports that nausea experienced prior to hospital admission was related to severity of lower abdominal cramping that has not recurred since abdominal pain has resolved. Tolerating oral intake without issue.     States that she has not had a bowel movement since hospital admission.  Prior to admission she had been experiencing daily bowel movements without melena or hematochezia with sensation of complete evacuation of stool.    Recent imaging significant for:    Mild nonspecific nodular thickening of adrenal glands, no bowel wall thickening or obstruction, minimal hiatal hernia, pelvic floor relaxation/rectocele apparent, colonic diverticulosis without evidence of acute diverticulitis.  No acute gastrointestinal process noted on imaging.    Labs Significant for:    WBC 39.23, Hgb 7.6, platelets 413, LFTs normal, BUN 53,     Most Recent endoscopic evaluation:     outpatient capsule recently which showed no source of anemia or bleeding.  She underwent push enteroscopy and C-scope on 2/9/2023, after Plavix was held for 5 days, with findings of  3 cm hiatal hernia, internal hemorrhoids, diverticulosis          Past Medical History:  Past Medical History:   Diagnosis Date    Atherosclerosis of abdominal aorta 02/05/2023    Atrial fibrillation     Breast cancer     CAD (coronary artery disease)     NSTEMI 2/2022: 90% ostial LAD, 99% D1, 70% mid-distal LAD (medical therapy). She received two stents (2.5x18 and 2.5x26mm Morgan LEFTY) but I don't know which one went to which lesion.    Carotid atherosclerosis     Cholecystitis 11/22/2022    Added automatically from request for surgery [id number]    Chronic diastolic (congestive) heart failure     COVID 10/29/2022    GERD (gastroesophageal reflux disease)     Glaucoma     History of cataract     Hypertension     Microcytic anemia     per Dr. oleg pate office  note 6/30/22-dd    Myeloproliferative disorder     JAK2 positive    Nonbacterial thrombotic endocarditis     6/2021: 4x5mm vegetation on the ventricular surface of the anterior MV, negative blood cultures    Porcelain gallbladder     PUD (peptic ulcer disease)     TIA (transient ischemic attack)     Type 2 diabetes mellitus     Type 2 diabetes mellitus with circulatory disorder, without long-term current use of insulin 07/14/2022    Upper GI bleed      Past Surgical History:  Past Surgical History:   Procedure Laterality Date    BREAST LUMPECTOMY  1999    CARDIAC CATHETERIZATION N/A 09/02/2022    Procedure: Coronary angiography;  Surgeon: Guero Verde MD;  Location:  [facility];  Service: Cardiovascular;  Laterality: N/A;    CARDIAC CATHETERIZATION N/A 09/02/2022    Procedure: Stent LEFTY coronary;  Surgeon: Guero Verde MD;  Location:  [facility];  Service: Cardiovascular;  Laterality: N/A;    CATARACT EXTRACTION  2011    CHOLECYSTECTOMY      CHOLECYSTECTOMY WITH INTRAOPERATIVE CHOLANGIOGRAM N/A 11/28/2022    Procedure: CHOLECYSTECTOMY LAPAROSCOPIC INTRAOPERATIVE CHOLANGIOGRAM;  Surgeon: Dani Grover Jr., MD;   "Location: [facility] MAIN OR;  Service: General;  Laterality: N/A;    COLONOSCOPY N/A 02/09/2023    Procedure: COLONOSCOPY TO CECUM & T.I.;  Surgeon: Guero Ferris MD;  Location: [facility] ENDOSCOPY;  Service: Gastroenterology;  Laterality: N/A;  PRE- MELENA, GI BLEED  POST- DIVERTICULOSIS, INT HEMORRHOIDS    ENDOSCOPY N/A 01/25/2023    Procedure: ESOPHAGOGASTRODUODENOSCOPY WITH BIOPSIES;  Surgeon: Charles Diaz MD;  Location: [facility] ENDOSCOPY;  Service: Gastroenterology;  Laterality: N/A;  pre: abd pain, nausea  post: hiatal hernia, mild gastritis, sloughing of the esophagus    ENTEROSCOPY SMALL BOWEL N/A 02/09/2023    Procedure: ENTEROSCOPY SMALL BOWEL;  Surgeon: Guero Ferris MD;  Location: [facility] ENDOSCOPY;  Service: Gastroenterology;  Laterality: N/A;  PRE- MELENA, GI BLEED  POST- HIATAL HERNIA    JOINT REPLACEMENT      UPPER GASTROINTESTINAL ENDOSCOPY        Social History:   Social History     Tobacco Use    Smoking status: Former     Packs/day: 1.00     Years: 20.00     Additional pack years: 0.00     Total pack years: 20.00     Types: Cigarettes     Passive exposure: Past    Smokeless tobacco: Never    Tobacco comments:     30  years ago   Substance Use Topics    Alcohol use: Yes     Comment: \"rare\"      Family History:  Family History   Problem Relation Age of Onset    Ovarian cancer Mother     Lung cancer Father     Lung cancer Sister     Malig Hyperthermia Neg Hx        Home Meds:  Medications Prior to Admission   Medication Sig Dispense Refill Last Dose    acetaminophen (TYLENOL) 500 MG tablet Take 1 tablet by mouth As Needed.   11/1/2023    apixaban (ELIQUIS) 5 MG tablet tablet Take 1 tablet by mouth 2 (Two) Times a Day. 180 tablet 3 11/2/2023    clopidogrel (PLAVIX) 75 MG tablet TAKE 1 TABLET DAILY 30 tablet 11 11/2/2023    famotidine (PEPCID) 20 MG tablet Take 1 tablet by mouth Daily.   11/2/2023    furosemide (LASIX) 40 MG tablet TAKE 1 TABLET DAILY 90 tablet 3 11/2/2023    " hydrocortisone-bacitracin-zinc oxide-nystatin (MAGIC BARRIER) Apply 1 application topically to the appropriate area as directed 2 (Two) Times a Day.   11/2/2023    latanoprost (XALATAN) 0.005 % ophthalmic solution Administer 1 drop to both eyes.   11/2/2023    metoprolol succinate XL (TOPROL-XL) 25 MG 24 hr tablet Take 1 tablet by mouth Daily. 90 tablet 2 11/2/2023    Momelotinib Dihydrochloride (OJJAARA) 200 MG tablet Take 1 tablet by mouth Daily. 28 tablet 3 11/2/2023    ondansetron (Zofran) 4 MG tablet Take 1 tablet by mouth Every 8 (Eight) Hours As Needed for Nausea or Vomiting. 90 tablet 0 Past Week    saccharomyces boulardii (FLORASTOR) 250 MG capsule Take 1 capsule by mouth 2 (Two) Times a Day. 60 capsule 0 11/2/2023    sertraline (ZOLOFT) 100 MG tablet Take 1 tablet by mouth Daily.   11/2/2023    simethicone (MYLICON) 80 MG chewable tablet Chew 1 tablet by mouth 4 (Four) Times a Day As Needed for Flatulence. 100 tablet 0 Past Week    sucralfate (CARAFATE) 1 g tablet Take 1 tablet by mouth Every 12 (Twelve) Hours.   11/2/2023    loperamide (IMODIUM) 2 MG capsule Take 1 capsule by mouth 4 (Four) Times a Day As Needed for Diarrhea. 120 capsule 0     mupirocin (BACTROBAN) 2 % ointment APPLY TOPICALLY TWICE DAILY TO WOUND        Current Meds:   [Held by provider] apixaban, 5 mg, Oral, BID  [Held by provider] clopidogrel, 75 mg, Oral, Daily  famotidine, 20 mg, Oral, Daily  latanoprost, 1 drop, Both Eyes, Nightly  metoprolol succinate XL, 25 mg, Oral, Daily  Momelotinib Dihydrochloride, 200 mg, Oral, Daily  saccharomyces boulardii, 250 mg, Oral, BID  senna-docusate sodium, 2 tablet, Oral, BID  sertraline, 100 mg, Oral, Daily      Allergies:  Allergies   Allergen Reactions    Aspirin Unknown - Low Severity    Oxycodone Unknown - Low Severity    Hydroxyurea Other (See Comments)     Her hemoglobin drop and was stop in February 2022 and start taking Jakafi    Diphenhydramine Hcl Anxiety and Unknown (See Comments)      Benadryl IVP     Review of Systems  Pertinent items are noted in HPI, all other systems reviewed and negative    Objective     Vital Signs  Temp:  [97.9 °F (36.6 °C)-98.2 °F (36.8 °C)] 98.2 °F (36.8 °C)  Heart Rate:  [] 93  Resp:  [16-20] 18  BP: ()/(46-67) 104/46    Physical Exam:  CONSTITUTIONAL:  today's vital signs reviewed  EARS NOSE THROAT: trachea midline and no deformity of the nares  EYES: no scleral icterus  GASTROINTESTINAL: abdomen is soft, nontender, nondistended with normal active bowel sounds, no masses are appreciated  PSYCHIATRIC: appropriate mood and affect  RESPIRATORY: normal inspiratory effort with no increased work of breathing  NEUROLOGIC: patient is awake and alert  DERMATOLOGIC: skin is warm with no cyanosis  LYMPHATIC: no periumbilical lymphadenopathy     Results Review:              I reviewed the patient's new clinical results.    Results from last 7 days   Lab Units 11/03/23  0540 11/02/23  1631   WBC 10*3/mm3 39.23* 44.77*   HEMOGLOBIN g/dL 7.6* 8.1*   HEMATOCRIT % 24.1* 24.6*   PLATELETS 10*3/mm3 413 522*     Results from last 7 days   Lab Units 11/03/23  0540 11/02/23  1631   SODIUM mmol/L 135* 132*   POTASSIUM mmol/L 3.9 4.2   CHLORIDE mmol/L 101 98   CO2 mmol/L 20.2* 20.0*   BUN mg/dL 53* 55*   CREATININE mg/dL 2.44* 2.96*   CALCIUM mg/dL 8.4* 8.5*   BILIRUBIN mg/dL  --  0.5   ALK PHOS U/L  --  115   ALT (SGPT) U/L  --  17   AST (SGOT) U/L  --  22   GLUCOSE mg/dL 93 148*         Lab Results   Lab Value Date/Time    LIPASE 34 11/02/2023 1631    LIPASE 17 08/13/2023 1023    LIPASE 16 05/03/2023 1534    LIPASE 14 02/17/2023 1130    LIPASE 10 (L) 02/04/2023 1419    LIPASE 13 01/23/2023 0216    LIPASE 20 11/21/2022 0250    LIPASE 38 08/31/2022 2131    LIPASE 16.0 02/09/2022 0525    LIPASE 50 06/29/2021 1131       Radiology:  CT Abdomen Pelvis Without Contrast   Final Result           1. A new indeterminate density in the lingula, as described.   Reticulonodular pulmonary  opacities in the bilateral lower lobes.   Follow-up evaluation advised.       2. Colonic diverticulosis. No acute inflammatory process of bowel is   identified. Follow-up as indications persist.       3. No urolithiasis or hydronephrosis. Small arterial calcification of   the left renal hilum.       This report was finalized on 11/2/2023 7:22 PM by Dr. Reilly Ritter M.D on Workstation: [id number]          CT Chest Without Contrast Diagnostic    (Results Pending)       Assessment & Plan   Active Hospital Problems    Diagnosis     **Acute kidney injury        Assessment:  Abdominal pain  Malaise  Acute on chronic anemia  S/p CCY 11/2022  Myeloproliferative disorder with chronic elevation of WBC and anemia on Jakafi (Trell inhibitor)  CAD on Plavix (stents 2/2022)  A-fib on Eliquis    Plan:  Nursing staff to notify GI service on call if any change in clinical status.  If diarrhea recurs we will plan to send stool to assess for infectious etiology  Supportive care per primary team with IVFs and antiemetics.   Continue famotidine 20 mg daily   Continue to monitor H&H and transfuse PRN    Anemia:    2/2023 EGD/Colonoscopy and capsule study without visible GI source to anemia and patient denies evidence of overt GI bleeding. Recommend proceeding with endoscopy evaluation in the event of overt GI bleeding or unexplained worsening anemia while recovering from other acute health concerns.     Diarrhea:    Discussed possible etiologies to acute diarrhea such as overflow diarrhea secondary to pelvic floor laxity noted on CT evaluation versus gastrointestinal infection.  Recommend patient start outpatient Konsyl fiber supplementation to promote complete emptying with outpatient follow-up with primary gastroenterologist for consideration of pelvic floor evaluation versus anorectal manometry testing.     Thank you for allowing us to participate in the care of this patient.   Please call us with questions or if we can be of  further assistance.     I discussed the patients findings and my recommendations with patient, family, and nursing staff.             ABEL Winter  [facility] [address] [address]

## 2023-11-03 NOTE — PROGRESS NOTES
Discharge Planning Assessment  [facility]     Patient Name: Catherine Boyer  MRN: [id number]  Today's Date: 11/3/2023    Admit Date: 11/2/2023    Plan: Return to [facility] with possible [facility]   Discharge Needs Assessment       Row Name 11/03/23 1616       Living Environment    Duration at Residence [facility]    Primary Care Provided by other (see comments)    Family Caregiver if Needed child(margarette), adult    Family Caregiver Names Daughter Ila    Quality of Family Relationships helpful;supportive;involved    Able to Return to Prior Arrangements yes       Discharge Needs Assessment    Anticipated Changes Related to Illness none                   Discharge Plan       Row Name 11/03/23 1626       Plan    Plan Return to [facility] with possible [facility]    Plan Comments Spoke with daughter at bedside Ila Boyer [phone]. Patient is at [facility] assisted living and is current with [facility], referral placed in [facility]. Daughter wants her to return to [facility] with possible [facility]. Referral in for Palliative Care for care planning. Hansel ZENG                  Continued Care and Services - Admitted Since 11/2/2023       Destination       Service Provider Request Status Selected Services Address Phone Fax Patient Preferred    [facility] [place] Pending - Request Sent N/A [address] [address] [phone] --              Home Medical Care       Service Provider Request Status Selected Services Address Phone Fax Patient Preferred    [facility] Accepted N/A [address], [address] [address] [phone] --                  Selected Continued Care - Episodes Includes continued care and service providers with selected services from the active episodes listed below      Oncology- External Fill Episode start date: 10/10/2023   There are no  active outsourced providers for this episode.                 Selected Continued Care - Prior Encounters Includes continued care and service providers with selected services from prior encounters from 8/4/2023 to 11/3/2023      Discharged on 10/2/2023 Admission date: 9/27/2023 - Discharge disposition: Home-Health Care [facility] [place]      Home Medical Care       Service Provider Selected Services Address Phone Fax Patient Preferred    [facility] [address], [address], [facility] [address] [phone] --                             Demographic Summary       Row Name 11/03/23 1611       General Information    Admission Type inpatient    Arrived From emergency department    Referral Source admission list    Reason for Consult discharge planning    Preferred Language English                   Functional Status       Row Name 11/03/23 1612       Functional Status    Usual Activity Tolerance fair    Current Activity Tolerance poor       Functional Status, IADL    Medications assistive person    Meal Preparation completely dependent    Housekeeping completely dependent    Laundry completely dependent    Shopping completely dependent                   Psychosocial    No documentation.                  Abuse/Neglect    No documentation.                  Legal    No documentation.                  Substance Abuse    No documentation.                  Patient Forms    No documentation.                     Neema Parsons RN

## 2023-11-03 NOTE — ED NOTES
Patient was changed at this time; This RN changed patient with ER tech. A stool specimen was unsuccessful due to patient not being able to have a bowel movement with a sufficient amount. Upon patient being changed, patient had visibile excoriation to the pelvic area. Patient was aware of painful areas; patient family was aware of patient excoriation of skin; patient family states it is from moisture of patients incontinence. MD was notified and a skin barrier cream was applied to pelvic and buttock area.

## 2023-11-03 NOTE — PAYOR COMM NOTE
"Maribell Boyer (83 y.o. Female)     PLEASE SEE ATTACHED FOR INPT AUTH     REF # [id number]    PLEASE CALL SARA SAMUELS RN/ DEPT @ [phone]  OR FAX  DEPARTMENT @  [phone]    THANK YOU   SARA SAMUELS RN  [facility]           Date of Birth   1940    Social Security Number       Address   [facility] [address] [address]    Home Phone   [phone]    MRN   [id number]       Jehovah's witness   Unknown    Marital Status                               Admission Date   11/2/23    Admission Type   Emergency    Admitting Provider   Jaimee Macias MD    Attending Provider   Junior Doan MD    Department, Room/Bed   [address], S[address]/1       Discharge Date       Discharge Disposition       Discharge Destination                                 Attending Provider: Junior Doan MD    Allergies: Aspirin, Oxycodone, Hydroxyurea, Diphenhydramine Hcl    Isolation: Spore   Infection: C.difficile (rule out) (11/02/23)   Code Status: CPR    Ht: 162 cm (63.78\")   Wt: 63 kg (138 lb 14.2 oz)    Admission Cmt: None   Principal Problem: Acute kidney injury [N17.9]                   Active Insurance as of 11/2/2023       Primary Coverage       Payor Plan Insurance Group Employer/Plan Group    AETNA MEDICARE REPLACEMENT AETNA MEDICARE REPLACEMENT [id number]       Payor Plan Address Payor Plan Phone Number Payor Plan Fax Number Effective Dates    [address] [phone]  1/1/2022 - None Entered    [facility] [id number]         Subscriber Name Subscriber Birth Date Member ID       MARIBELL BOYER [id number] 1940 [id number]                     Emergency Contacts        (Rel.) Home Phone Work Phone Mobile Phone    RAY BOYER (Daughter) [phone] -- [phone]    Karan Boyer/[facility] (Son) -- -- [phone]    Karen Gutierrez (Daughter) -- -- [phone]    Greg Boyer (Son) -- -- [phone]    " MEG BANKS (Brother) -- -- [phone]              [place]: [facility] [id number]  Tax ID [id number]     History & Physical        Stingl, Jaimee Mckeon MD at 23 [phone]          HISTORY AND PHYSICAL   [facility]        Date of Admission: 2023  Patient Identification:  Name: Catherine Boyer  Age: 83 y.o.  Sex: female  :  1940  MRN: [id number]                     Primary Care Physician: Kristi Moreau APRN    Chief Complaint:  83 year old female who presented to the emergency room with nausea, vomiting and malaise for the last three days; she has had some abdominal pain and cramping and diarrhea; she has not been able to keep anything down; no fever or chills; she is feeling better after receiving iv fluids in the ED    History of Present Illness:   As above    Past Medical History:  Past Medical History:   Diagnosis Date    Atherosclerosis of abdominal aorta 2023    Atrial fibrillation     Breast cancer     CAD (coronary artery disease)     NSTEMI 2022: 90% ostial LAD, 99% D1, 70% mid-distal LAD (medical therapy). She received two stents (2.5x18 and 2.5x26mm Finesse LEFTY) but I don't know which one went to which lesion.    Carotid atherosclerosis     Cholecystitis 2022    Added automatically from request for surgery [id number]    Chronic diastolic (congestive) heart failure     COVID 10/29/2022    GERD (gastroesophageal reflux disease)     Glaucoma     History of cataract     Hypertension     Microcytic anemia     per Dr. oleg pate office  note 22-dd    Myeloproliferative disorder     JAK2 positive    Nonbacterial thrombotic endocarditis     2021: 4x5mm vegetation on the ventricular surface of the anterior MV, negative blood cultures    Porcelain gallbladder     PUD (peptic ulcer disease)     TIA (transient ischemic attack)     Type 2 diabetes mellitus     Type 2 diabetes mellitus with circulatory disorder, without long-term current use of insulin 2022    Upper  GI bleed      Past Surgical History:  Past Surgical History:   Procedure Laterality Date    BREAST LUMPECTOMY  1999    CARDIAC CATHETERIZATION N/A 09/02/2022    Procedure: Coronary angiography;  Surgeon: Guero Verde MD;  Location:  [facility] CATH INVASIVE LOCATION;  Service: Cardiovascular;  Laterality: N/A;    CARDIAC CATHETERIZATION N/A 09/02/2022    Procedure: Stent LEFTY coronary;  Surgeon: Guero Verde MD;  Location: [facility] CATH INVASIVE LOCATION;  Service: Cardiovascular;  Laterality: N/A;    CATARACT EXTRACTION  2011    CHOLECYSTECTOMY      CHOLECYSTECTOMY WITH INTRAOPERATIVE CHOLANGIOGRAM N/A 11/28/2022    Procedure: CHOLECYSTECTOMY LAPAROSCOPIC INTRAOPERATIVE CHOLANGIOGRAM;  Surgeon: Dani Grover Jr., MD;  Location: [facility] MAIN OR;  Service: General;  Laterality: N/A;    COLONOSCOPY N/A 02/09/2023    Procedure: COLONOSCOPY TO CECUM & T.I.;  Surgeon: Guero Ferris MD;  Location: [facility] ENDOSCOPY;  Service: Gastroenterology;  Laterality: N/A;  PRE- MELENA, GI BLEED  POST- DIVERTICULOSIS, INT HEMORRHOIDS    ENDOSCOPY N/A 01/25/2023    Procedure: ESOPHAGOGASTRODUODENOSCOPY WITH BIOPSIES;  Surgeon: Charles Diaz MD;  [facility];  Service: Gastroenterology;  Laterality: N/A;  pre: abd pain, nausea  post: hiatal hernia, mild gastritis, sloughing of the esophagus    ENTEROSCOPY SMALL BOWEL N/A 02/09/2023    Procedure: ENTEROSCOPY SMALL BOWEL;  Surgeon: Guero Ferris MD;  Location: [facility] ENDOSCOPY;  Service: Gastroenterology;  Laterality: N/A;  PRE- MELENA, GI BLEED  POST- HIATAL HERNIA    JOINT REPLACEMENT      UPPER GASTROINTESTINAL ENDOSCOPY        Home Meds:  Medications Prior to Admission   Medication Sig Dispense Refill Last Dose    acetaminophen (TYLENOL) 500 MG tablet Take 1 tablet by mouth As Needed.   11/1/2023    apixaban (ELIQUIS) 5 MG tablet tablet Take 1 tablet by mouth 2 (Two) Times a Day. 180 tablet 3 11/2/2023    clopidogrel (PLAVIX) 75 MG tablet TAKE 1 TABLET DAILY  30 tablet 11 11/2/2023    famotidine (PEPCID) 20 MG tablet Take 1 tablet by mouth Daily.   11/2/2023    furosemide (LASIX) 40 MG tablet TAKE 1 TABLET DAILY 90 tablet 3 11/2/2023    hydrocortisone-bacitracin-zinc oxide-nystatin (MAGIC BARRIER) Apply 1 application topically to the appropriate area as directed 2 (Two) Times a Day.   11/2/2023    latanoprost (XALATAN) 0.005 % ophthalmic solution Administer 1 drop to both eyes.   11/2/2023    metoprolol succinate XL (TOPROL-XL) 25 MG 24 hr tablet Take 1 tablet by mouth Daily. 90 tablet 2 11/2/2023    Momelotinib Dihydrochloride (OJJAARA) 200 MG tablet Take 1 tablet by mouth Daily. 28 tablet 3 11/2/2023    ondansetron (Zofran) 4 MG tablet Take 1 tablet by mouth Every 8 (Eight) Hours As Needed for Nausea or Vomiting. 90 tablet 0 Past Week    saccharomyces boulardii (FLORASTOR) 250 MG capsule Take 1 capsule by mouth 2 (Two) Times a Day. 60 capsule 0 11/2/2023    sertraline (ZOLOFT) 100 MG tablet Take 1 tablet by mouth Daily.   11/2/2023    simethicone (MYLICON) 80 MG chewable tablet Chew 1 tablet by mouth 4 (Four) Times a Day As Needed for Flatulence. 100 tablet 0 Past Week    sucralfate (CARAFATE) 1 g tablet Take 1 tablet by mouth Every 12 (Twelve) Hours.   11/2/2023    loperamide (IMODIUM) 2 MG capsule Take 1 capsule by mouth 4 (Four) Times a Day As Needed for Diarrhea. 120 capsule 0     mupirocin (BACTROBAN) 2 % ointment APPLY TOPICALLY TWICE DAILY TO WOUND          Allergies:  Allergies   Allergen Reactions    Aspirin Unknown - Low Severity    Oxycodone Unknown - Low Severity    Hydroxyurea Other (See Comments)     Her hemoglobin drop and was stop in February 2022 and start taking Jakafi    Diphenhydramine Hcl Anxiety and Unknown (See Comments)     Benadryl IVP     Immunizations:  Immunization History   Administered Date(s) Administered    Pneumococcal Conjugate 13-Valent (PCV13) 07/08/2021     Social History:   Social History     Social History Narrative    Not on file  "    Social History     Socioeconomic History    Marital status:    Tobacco Use    Smoking status: Former     Packs/day: 1.00     Years: 20.00     Additional pack years: 0.00     Total pack years: 20.00     Types: Cigarettes     Passive exposure: Past    Smokeless tobacco: Never    Tobacco comments:     30  years ago   Vaping Use    Vaping Use: Never used   Substance and Sexual Activity    Alcohol use: Yes     Comment: \"rare\"    Drug use: Never    Sexual activity: Defer       Family History:  Family History   Problem Relation Age of Onset    Ovarian cancer Mother     Lung cancer Father     Lung cancer Sister     Malig Hyperthermia Neg Hx         Review of Systems  See history of present illness and past medical history.  Patient denies headache, dizziness, syncope, falls, trauma, change in vision, change in hearing,  focal weakness, numbness, or paresthesia.  Patient denies chest pain, palpitations, dyspnea, orthopnea, PND, cough, sinus pressure, rhinorrhea, epistaxis, hemoptysis ,hematemesis, diarrhea, constipation or hematochezia.  Denies cold or heat intolerance, polydipsia, polyuria, polyphagia. Denies hematuria, pyuria, dysuria, hesitancy, frequency or urgency. Denies consumption of raw and under cooked meats foods or change in water source.  Denies fever, chills, sweats, night sweats.        Objective:  T Max 24 hrs: Temp (24hrs), Av.9 °F (36.6 °C), Min:97.9 °F (36.6 °C), Max:97.9 °F (36.6 °C)    Vitals Ranges:   Temp:  [97.9 °F (36.6 °C)] 97.9 °F (36.6 °C)  Heart Rate:  [100] 100  Resp:  [16] 16  BP: (145)/(58) 145/58      Exam:  /58   Pulse 100   Temp 97.9 °F (36.6 °C)   Resp 16   SpO2 96%     General Appearance:    Alert, cooperative, no distress, appears stated age   Head:    Normocephalic, without obvious abnormality, atraumatic   Eyes:    PERRL, conjunctivae/corneas clear, EOM's intact, both eyes   Ears:    Normal external ear canals, both ears   Nose:   Nares normal, septum midline, " mucosa normal, no drainage    or sinus tenderness   Throat:   Lips, mucosa, and tongue normal   Neck:   Supple, symmetrical, trachea midline, no adenopathy;     thyroid:  no enlargement/tenderness/nodules; no carotid    bruit or JVD   Back:     Symmetric, no curvature, ROM normal, no CVA tenderness   Lungs:     Decreased breath sounds bilaterally, respirations unlabored   Chest Wall:    No tenderness or deformity    Heart:    Regular rate and rhythm, S1 and S2 normal, no murmur, rub   or gallop   Abdomen:     Soft, nontender, bowel sounds active all four quadrants,     no masses, no hepatomegaly, no splenomegaly   Extremities:   Extremities normal, atraumatic, no cyanosis or edema                       .    Data Review:  Labs in chart were reviewed.  WBC   Date Value Ref Range Status   11/02/2023 44.77 (C) 3.40 - 10.80 10*3/mm3 Final     Hemoglobin   Date Value Ref Range Status   11/02/2023 8.1 (L) 12.0 - 15.9 g/dL Final     Hematocrit   Date Value Ref Range Status   11/02/2023 24.6 (L) 34.0 - 46.6 % Final     Platelets   Date Value Ref Range Status   11/02/2023 522 (H) 140 - 450 10*3/mm3 Final     Sodium   Date Value Ref Range Status   11/02/2023 132 (L) 136 - 145 mmol/L Final     Potassium   Date Value Ref Range Status   11/02/2023 4.2 3.5 - 5.2 mmol/L Final     Comment:     Slight hemolysis detected by analyzer. Results may be affected.     Chloride   Date Value Ref Range Status   11/02/2023 98 98 - 107 mmol/L Final     CO2   Date Value Ref Range Status   11/02/2023 20.0 (L) 22.0 - 29.0 mmol/L Final     BUN   Date Value Ref Range Status   11/02/2023 55 (H) 8 - 23 mg/dL Final     Creatinine   Date Value Ref Range Status   11/02/2023 2.96 (H) 0.57 - 1.00 mg/dL Final     Glucose   Date Value Ref Range Status   11/02/2023 148 (H) 65 - 99 mg/dL Final     Calcium   Date Value Ref Range Status   11/02/2023 8.5 (L) 8.6 - 10.5 mg/dL Final                Imaging Results (All)       Procedure Component Value Units Date/Time     CT Abdomen Pelvis Without Contrast [[id number]] Collected: 11/02/23 1912     Updated: 11/02/23 1925    Narrative:      CT ABDOMEN PELVIS WO CONTRAST-     INDICATIONS: Abdominal pain     TECHNIQUE: Radiation dose reduction techniques were utilized, including  automated exposure control and exposure modulation based on body size.  Unenhanced ABDOMEN AND PELVIS CT     COMPARISON: 8/13/2023     FINDINGS:     The gallbladder is surgically absent.     Mild nonspecific nodular thickening of the adrenal glands is seen.     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 A small arterial calcification is  apparent at the left renal hilum. No hydronephrosis.     Otherwise unremarkable unenhanced appearance of the liver, spleen,  adrenal glands, pancreas, kidneys, bladder.     No bowel obstruction or abnormal bowel thickening is identified. Colonic  diverticula are seen that do not appear inflamed. Minimal hiatal hernia  is seen. Pelvic floor relaxation/rectocele is apparent, correlate with  clinical exam.     No free intraperitoneal gas or free fluid. Umbilical hernia of fat is  noted.     Scattered small mesenteric and para-aortic lymph nodes are seen that are  not significant by size criteria.     Abdominal aorta is not aneurysmal. Aortic and other arterial  calcifications are present.     The lung bases show a new pleural-based density in the lingula, 1.3 x  3.3 cm on axial image 8, that could be rounded atelectasis or  potentially a focus of infection or even neoplasm, correlate clinically,  PET/CT correlation can be obtained as indicated, in addition to  continued CT follow-up. Reticulonodular opacities in the lower lobes may  be inflammatory/infectious in etiology, follow-up recommended.     Degenerative changes are seen in the spine. Stable sclerotic foci in the  left aspect of the sacrum, right femur, nonspecific, may represent bone  island. No acute fracture is identified.             Impression:            1. A new  indeterminate density in the lingula, as described.  Reticulonodular pulmonary opacities in the bilateral lower lobes.  Follow-up evaluation advised.     2. Colonic diverticulosis. No acute inflammatory process of bowel is  identified. Follow-up as indications persist.     3. No urolithiasis or hydronephrosis. Small arterial calcification of  the left renal hilum.     This report was finalized on 11/2/2023 7:22 PM by Dr. Reilly Ritter M.D on Workstation: [employer]                 Assessment:  Active Hospital Problems    Diagnosis  POA    **Acute kidney injury [N17.9]  Yes      Resolved Hospital Problems   No resolved problems to display.   Nausea and vomiting  Cad  Chronic diastolic chf  A fib  Hypertension  Diabetes  Hyponatremia  Anemia  Myeloproliferative disorder  hyperglycemia    Plan:  Continue fluids  Ask nephrology to see her  Ask pulmonary to see her regarding her abnormal ct scan  Monitor on telemetry  Heme occult stool  Ask gi to see her  Hold eliquis and plavix for now  Dw patient and ed provider    Jaimee Macias MD  11/2/2023  23:37 EDT      Electronically signed by Jaimee Macias MD at 11/02/23 6719          Emergency Department Notes        Yanet Gutierrez RN at 11/02/23 1953          Nursing report ED to floor  Catherinejanae Boyer  83 y.o.  female    HPI :   Chief Complaint   Patient presents with    Abdominal Pain    Nausea    Vomiting       Admitting doctor:   Jaimee Macias MD    Admitting diagnosis:   The primary encounter diagnosis was Acute renal failure, unspecified acute renal failure type. Diagnoses of Acute abdominal pain and Nausea vomiting and diarrhea were also pertinent to this visit.    Code status:   Current Code Status       Date Active Code Status Order ID Comments User Context       11/2/2023 1949 CPR (Attempt to Resuscitate) [id number]  Jaimee Macias MD ED        Question Answer    Code Status (Patient has no pulse and is not breathing) CPR (Attempt  to Resuscitate)    Medical Interventions (Patient has pulse or is breathing) Full                    Allergies:   Aspirin, Oxycodone, Hydroxyurea, and Diphenhydramine hcl    Isolation:   No active isolations    Intake and Output    Intake/Output Summary (Last 24 hours) at 11/2/2023 1953  Last data filed at 11/2/2023 1946  Gross per 24 hour   Intake 500 ml   Output --   Net 500 ml       Weight:   There were no vitals filed for this visit.    Most recent vitals:   Vitals:    11/02/23 1617   BP: 145/58   Pulse: 100   Resp: 16   Temp: 97.9 °F (36.6 °C)   SpO2: 96%       Active LDAs/IV Access:   Lines, Drains & Airways       Active LDAs       Name Placement date Placement time Site Days    Peripheral IV 11/02/23 Left Antecubital 11/02/23  --  Antecubital  less than 1    External Urinary Catheter 08/13/23  2300  --  80                    Labs (abnormal labs have a star):   Labs Reviewed   COMPREHENSIVE METABOLIC PANEL - Abnormal; Notable for the following components:       Result Value    Glucose 148 (*)     BUN 55 (*)     Creatinine 2.96 (*)     Sodium 132 (*)     CO2 20.0 (*)     Calcium 8.5 (*)     eGFR 15.0 (*)     All other components within normal limits    Narrative:     GFR Normal >60  Chronic Kidney Disease <60  Kidney Failure <15    The GFR formula is only valid for adults with stable renal function between ages 18 and 70.   CBC WITH AUTO DIFFERENTIAL - Abnormal; Notable for the following components:    WBC 44.77 (*)     RBC 2.74 (*)     Hemoglobin 8.1 (*)     Hematocrit 24.6 (*)     RDW 18.0 (*)     RDW-SD 57.0 (*)     Platelets 522 (*)     All other components within normal limits   MANUAL DIFFERENTIAL - Abnormal; Notable for the following components:    Neutrophil % 87.2 (*)     Lymphocyte % 6.4 (*)     Monocyte % 3.2 (*)     Basophil % 2.1 (*)     Neutrophils Absolute 39.04 (*)     Monocytes Absolute 1.43 (*)     Eosinophils Absolute 0.49 (*)     Basophils Absolute 0.94 (*)     All other components within  normal limits   LIPASE - Normal   CLOSTRIDIOIDES DIFFICILE TOXIN    Narrative:     The following orders were created for panel order Clostridioides difficile Toxin - Stool, Per Rectum.  Procedure                               Abnormality         Status                     ---------                               -----------         ------                     Clostridioides difficile...[[id number]]                                                   Please view results for these tests on the individual orders.   GASTROINTESTINAL PANEL, PCR (PREFERRED) DOES NOT INCLUDE CDIFF   CLOSTRIDIOIDES DIFFICILE TOXIN, PCR   URINALYSIS W/ MICROSCOPIC IF INDICATED (NO CULTURE)   CBC AND DIFFERENTIAL    Narrative:     The following orders were created for panel order CBC & Differential.  Procedure                               Abnormality         Status                     ---------                               -----------         ------                     CBC Auto Differential[[id number]]        Abnormal            Final result                 Please view results for these tests on the individual orders.       EKG:   No orders to display       Meds given in ED:   Medications   sodium chloride 0.9 % flush 10 mL (has no administration in time range)   acetaminophen (TYLENOL) tablet 650 mg (has no administration in time range)   sennosides-docusate (PERICOLACE) 8.6-50 MG per tablet 2 tablet (has no administration in time range)     And   polyethylene glycol (MIRALAX) packet 17 g (has no administration in time range)     And   bisacodyl (DULCOLAX) EC tablet 5 mg (has no administration in time range)     And   bisacodyl (DULCOLAX) suppository 10 mg (has no administration in time range)   ondansetron (ZOFRAN) tablet 4 mg (has no administration in time range)     Or   ondansetron (ZOFRAN) injection 4 mg (has no administration in time range)   melatonin tablet 3 mg (has no administration in time range)   morphine injection 2 mg (2 mg  "Intravenous Given 11/2/23 1716)   ondansetron (ZOFRAN) injection 4 mg (4 mg Intravenous Given 11/2/23 1715)   sodium chloride 0.9 % bolus 500 mL (0 mL Intravenous Stopped 11/2/23 1946)       Imaging results:  CT Abdomen Pelvis Without Contrast    Result Date: 11/2/2023    1. A new indeterminate density in the lingula, as described. Reticulonodular pulmonary opacities in the bilateral lower lobes. Follow-up evaluation advised.  2. Colonic diverticulosis. No acute inflammatory process of bowel is identified. Follow-up as indications persist.  3. No urolithiasis or hydronephrosis. Small arterial calcification of the left renal hilum.  This report was finalized on 11/2/2023 7:22 PM by Dr. Reilly Ritter M.D on Workstation: [employer]       Ambulatory status:   - Assist x2     Social issues:   Social History     Socioeconomic History    Marital status:    Tobacco Use    Smoking status: Former     Packs/day: 1.00     Years: 20.00     Additional pack years: 0.00     Total pack years: 20.00     Types: Cigarettes     Passive exposure: Past    Smokeless tobacco: Never    Tobacco comments:     30  years ago   Vaping Use    Vaping Use: Never used   Substance and Sexual Activity    Alcohol use: Yes     Comment: \"rare\"    Drug use: Never    Sexual activity: Defer       NIH Stroke Scale:       Yanet Gutierrez RN  11/02/23 19:53 EDT          Electronically signed by Yanet Gutierrez RN at 11/02/23 1954       Yanet Gutierrez RN at 11/02/23 1730          Patient was changed at this time; This RN changed patient with  tech. A stool specimen was unsuccessful due to patient not being able to have a bowel movement with a sufficient amount. Upon patient being changed, patient had visibile excoriation to the pelvic area. Patient was aware of painful areas; patient family was aware of patient excoriation of skin; patient family states it is from moisture of patients incontinence. MD was notified and a skin barrier cream was applied to " pelvic and buttock area.        Electronically signed by Yanet Gutierrez RN at 11/02/23 2111       Tommy Clemente MD at 11/02/23 1705           EMERGENCY DEPARTMENT ENCOUNTER    Room Number:  25/25  PCP: Kristi Moreau APRN  Patient Care Team:  Kristi Moreau APRN as PCP - General (Nurse Practitioner)  Gerard Foreman MD as Referring Physician (Medical Oncology)  Darrell Reinoso MD as Consulting Physician (Hematology and Oncology)  Albin Barriga MD as Cardiologist (Cardiology)  Richard Aldana RN as    Independent Historians: Patient, family, EMS    HPI:  Chief Complaint: Abdominal pain, nausea, vomiting, diarrhea    A complete HPI/ROS/PMH/PSH/SH/FH are unobtainable due to: Nothing    Chronic or social conditions impacting patient care (Social Determinants of Health): None  (Financial Resource Strain / Food Insecurity / Transportation Needs / Physical Activity / Stress / Social Connections / Intimate Partner Violence / Housing Stability)    Context: Catherine Boyer is a 83 y.o. female who presents to the ED c/o acute abdominal pain.  Daughter reports that since Monday she has had some abdominal cramping and discomfort.  She has had some nausea.  She reports she has developed vomiting and diarrhea.  She has not been able to tolerate anything by mouth.  Daughter reports that she gave her a Phenergan suppository last night.  She states she has taken an Imodium.  Nothing makes her symptoms worse or better.  She denies fever.    Review of prior external notes (non-ED) -and- Review of prior external test results outside of this encounter: [facility] note dated 10/18/2023 with myelofibrosis.  She was examined with Plavix and Eliquis.    Prescription drug monitoring program review:         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     Paroxysmal atrial fibrillation 07/14/2022    Myeloproliferative disorder 07/14/2022    Microcytic anemia 07/14/2022    Type 2 diabetes mellitus with circulatory disorder, without long-term current use of insulin 07/14/2022    GERD (gastroesophageal reflux disease)     Hypertension     Personal history of transient ischemic attack (TIA), 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 deficits 09/04/2022    Porcelain gallbladder     Breast cancer     Atherosclerosis of abdominal aorta 02/05/2023    APC (atrial premature contractions) 02/24/2023    Moderate malnutrition 05/05/2023    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 05/05/2023    Elevated troponin 09/27/2023    TIA (transient ischemic attack) 09/27/2023    PUD (peptic ulcer disease) 09/27/2023    Anemia 09/28/2023    Tachycardia 10/18/2023    Myelofibrosis 10/18/2023    Normocytic anemia 10/18/2023     Resolved Ambulatory Problems     Diagnosis Date Noted    Chest pain with high risk for cardiac etiology 08/31/2022    COVID 10/29/2022    Chronic diastolic heart failure     Acute UTI (urinary tract infection) 11/21/2022    Cholecystitis 11/22/2022    Urinary tract infection without hematuria, site unspecified 01/23/2023    Epigastric abdominal pain 01/24/2023    Gastritis 01/26/2023    ABLA (acute blood loss anemia) 02/04/2023    Hypokalemia 02/07/2023    Rotavirus enteritis 05/04/2023    KARLI (acute kidney injury) 05/04/2023    Acute renal failure, unspecified acute renal failure type 05/04/2023    KARLI (acute kidney injury) 08/13/2023    Intractable nausea and vomiting 08/13/2023    Dyspnea 09/27/2023    Acute on chronic heart failure with preserved ejection fraction (HFpEF) 10/02/2023     Past Medical History:   Diagnosis Date    Atrial fibrillation     Carotid atherosclerosis     Chronic diastolic (congestive) heart failure     Glaucoma     History of cataract     Type 2 diabetes mellitus     Upper GI bleed          PAST SURGICAL HISTORY  Past Surgical History:   Procedure  Laterality Date    BREAST LUMPECTOMY  1999    CARDIAC CATHETERIZATION N/A 09/02/2022    Procedure: Coronary angiography;  Surgeon: Guero Verde MD;  Location: [facility] CATH INVASIVE LOCATION;  Service: Cardiovascular;  Laterality: N/A;    CARDIAC CATHETERIZATION N/A 09/02/2022    Procedure: Stent LEFTY coronary;  Surgeon: Guero Verde MD;  Location: [facility] CATH INVASIVE LOCATION;  Service: Cardiovascular;  Laterality: N/A;    CATARACT EXTRACTION  2011    CHOLECYSTECTOMY      CHOLECYSTECTOMY WITH INTRAOPERATIVE CHOLANGIOGRAM N/A 11/28/2022    Procedure: CHOLECYSTECTOMY LAPAROSCOPIC INTRAOPERATIVE CHOLANGIOGRAM;  Surgeon: Dani Grover Jr., MD;  Location: [facility] MAIN OR;  Service: General;  Laterality: N/A;    COLONOSCOPY N/A 02/09/2023    Procedure: COLONOSCOPY TO CECUM & T.I.;  Surgeon: Guero Ferris MD;  Location: [facility] ENDOSCOPY;  Service: Gastroenterology;  Laterality: N/A;  PRE- MELENA, GI BLEED  POST- DIVERTICULOSIS, INT HEMORRHOIDS    ENDOSCOPY N/A 01/25/2023    Procedure: ESOPHAGOGASTRODUODENOSCOPY WITH BIOPSIES;  Surgeon: Charles Diaz MD;  Location: [facility] ENDOSCOPY;  Service: Gastroenterology;  Laterality: N/A;  pre: abd pain, nausea  post: hiatal hernia, mild gastritis, sloughing of the esophagus    ENTEROSCOPY SMALL BOWEL N/A 02/09/2023    Procedure: ENTEROSCOPY SMALL BOWEL;  Surgeon: Guero Ferris MD;  Location: [facility] ENDOSCOPY;  Service: Gastroenterology;  Laterality: N/A;  PRE- MELENA, GI BLEED  POST- HIATAL HERNIA    JOINT REPLACEMENT      UPPER GASTROINTESTINAL ENDOSCOPY           FAMILY HISTORY  Family History   Problem Relation Age of Onset    Ovarian cancer Mother     Lung cancer Father     Lung cancer Sister     Malig Hyperthermia Neg Hx          SOCIAL HISTORY  Social History     Socioeconomic History    Marital status:    Tobacco Use    Smoking status: Former     Packs/day: 1.00     Years: 20.00     Additional pack years: 0.00     Total pack years: 20.00     Types:  "Cigarettes     Passive exposure: Past    Smokeless tobacco: Never    Tobacco comments:     30  years ago   Vaping Use    Vaping Use: Never used   Substance and Sexual Activity    Alcohol use: Yes     Comment: \"rare\"    Drug use: Never    Sexual activity: Defer         ALLERGIES  Aspirin, Oxycodone, Hydroxyurea, and Diphenhydramine hcl        REVIEW OF SYSTEMS  Review of Systems  Included in HPI  All systems reviewed and negative except for those discussed in HPI.      PHYSICAL EXAM    I have reviewed the triage vital signs and nursing notes.    ED Triage Vitals [11/02/23 1617]   Temp Heart Rate Resp BP SpO2   97.9 °F (36.6 °C) 100 16 145/58 96 %      Temp src Heart Rate Source Patient Position BP Location FiO2 (%)   -- -- -- -- --       Physical Exam  GENERAL: Awake, alert, chronically ill-appearing  SKIN: Warm, dry  HENT: Normocephalic, atraumatic  EYES: no scleral icterus  CV: regular rhythm, regular rate  RESPIRATORY: normal effort, lungs clear  ABDOMEN: soft, mild generalized tenderness, nondistended  MUSCULOSKELETAL: no deformity  NEURO: alert, moves all extremities, follows commands                                                               LAB RESULTS  Recent Results (from the past 24 hour(s))   Comprehensive Metabolic Panel    Collection Time: 11/02/23  4:31 PM    Specimen: Blood   Result Value Ref Range    Glucose 148 (H) 65 - 99 mg/dL    BUN 55 (H) 8 - 23 mg/dL    Creatinine 2.96 (H) 0.57 - 1.00 mg/dL    Sodium 132 (L) 136 - 145 mmol/L    Potassium 4.2 3.5 - 5.2 mmol/L    Chloride 98 98 - 107 mmol/L    CO2 20.0 (L) 22.0 - 29.0 mmol/L    Calcium 8.5 (L) 8.6 - 10.5 mg/dL    Total Protein 6.1 6.0 - 8.5 g/dL    Albumin 3.7 3.5 - 5.2 g/dL    ALT (SGPT) 17 1 - 33 U/L    AST (SGOT) 22 1 - 32 U/L    Alkaline Phosphatase 115 39 - 117 U/L    Total Bilirubin 0.5 0.0 - 1.2 mg/dL    Globulin 2.4 gm/dL    A/G Ratio 1.5 g/dL    BUN/Creatinine Ratio 18.6 7.0 - 25.0    Anion Gap 14.0 5.0 - 15.0 mmol/L    eGFR 15.0 (L) " >60.0 mL/min/1.73   Lipase    Collection Time: 11/02/23  4:31 PM    Specimen: Blood   Result Value Ref Range    Lipase 34 13 - 60 U/L   CBC Auto Differential    Collection Time: 11/02/23  4:31 PM    Specimen: Blood   Result Value Ref Range    WBC 44.77 (C) 3.40 - 10.80 10*3/mm3    RBC 2.74 (L) 3.77 - 5.28 10*6/mm3    Hemoglobin 8.1 (L) 12.0 - 15.9 g/dL    Hematocrit 24.6 (L) 34.0 - 46.6 %    MCV 89.8 79.0 - 97.0 fL    MCH 29.6 26.6 - 33.0 pg    MCHC 32.9 31.5 - 35.7 g/dL    RDW 18.0 (H) 12.3 - 15.4 %    RDW-SD 57.0 (H) 37.0 - 54.0 fl    MPV 10.2 6.0 - 12.0 fL    Platelets 522 (H) 140 - 450 10*3/mm3   Manual Differential    Collection Time: 11/02/23  4:31 PM    Specimen: Blood   Result Value Ref Range    Neutrophil % 87.2 (H) 42.7 - 76.0 %    Lymphocyte % 6.4 (L) 19.6 - 45.3 %    Monocyte % 3.2 (L) 5.0 - 12.0 %    Eosinophil % 1.1 0.3 - 6.2 %    Basophil % 2.1 (H) 0.0 - 1.5 %    Neutrophils Absolute 39.04 (H) 1.70 - 7.00 10*3/mm3    Lymphocytes Absolute 2.87 0.70 - 3.10 10*3/mm3    Monocytes Absolute 1.43 (H) 0.10 - 0.90 10*3/mm3    Eosinophils Absolute 0.49 (H) 0.00 - 0.40 10*3/mm3    Basophils Absolute 0.94 (H) 0.00 - 0.20 10*3/mm3    Anisocytosis Mod/2+ None Seen    Alonzo Cells Slight/1+ None Seen    Dacrocytes Slight/1+ None Seen    Macrocytes Slight/1+ None Seen    Poikilocytes Mod/2+ None Seen    Stomatocytes Mod/2+ None Seen    WBC Morphology Normal Normal    Platelet Morphology Normal Normal       Ordered the above labs and independently reviewed the results.        RADIOLOGY  CT Abdomen Pelvis Without Contrast    Result Date: 11/2/2023  CT ABDOMEN PELVIS WO CONTRAST-  INDICATIONS: Abdominal pain  TECHNIQUE: Radiation dose reduction techniques were utilized, including automated exposure control and exposure modulation based on body size. Unenhanced ABDOMEN AND PELVIS CT  COMPARISON: 8/13/2023  FINDINGS:  The gallbladder is surgically absent.  Mild nonspecific nodular thickening of the adrenal glands is seen.   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 A small arterial calcification is apparent at the left renal hilum. No hydronephrosis.  Otherwise unremarkable unenhanced appearance of the liver, spleen, adrenal glands, pancreas, kidneys, bladder.  No bowel obstruction or abnormal bowel thickening is identified. Colonic diverticula are seen that do not appear inflamed. Minimal hiatal hernia is seen. Pelvic floor relaxation/rectocele is apparent, correlate with clinical exam.  No free intraperitoneal gas or free fluid. Umbilical hernia of fat is noted.  Scattered small mesenteric and para-aortic lymph nodes are seen that are not significant by size criteria.  Abdominal aorta is not aneurysmal. Aortic and other arterial calcifications are present.  The lung bases show a new pleural-based density in the lingula, 1.3 x 3.3 cm on axial image 8, that could be rounded atelectasis or potentially a focus of infection or even neoplasm, correlate clinically, PET/CT correlation can be obtained as indicated, in addition to continued CT follow-up. Reticulonodular opacities in the lower lobes may be inflammatory/infectious in etiology, follow-up recommended.  Degenerative changes are seen in the spine. Stable sclerotic foci in the left aspect of the sacrum, right femur, nonspecific, may represent bone island. No acute fracture is identified.          1. A new indeterminate density in the lingula, as described. Reticulonodular pulmonary opacities in the bilateral lower lobes. Follow-up evaluation advised.  2. Colonic diverticulosis. No acute inflammatory process of bowel is identified. Follow-up as indications persist.  3. No urolithiasis or hydronephrosis. Small arterial calcification of the left renal hilum.  This report was finalized on 11/2/2023 7:22 PM by Dr. Reilly Ritter M.D on Workstation: [id number]       I ordered the above noted radiological studies. Reviewed by me and discussed with radiologist.  See dictation for official  radiology interpretation.      PROCEDURES    Procedures      MEDICATIONS GIVEN IN ER    Medications   sodium chloride 0.9 % flush 10 mL (has no administration in time range)   acetaminophen (TYLENOL) tablet 650 mg (has no administration in time range)   sennosides-docusate (PERICOLACE) 8.6-50 MG per tablet 2 tablet (has no administration in time range)     And   polyethylene glycol (MIRALAX) packet 17 g (has no administration in time range)     And   bisacodyl (DULCOLAX) EC tablet 5 mg (has no administration in time range)     And   bisacodyl (DULCOLAX) suppository 10 mg (has no administration in time range)   ondansetron (ZOFRAN) tablet 4 mg (has no administration in time range)     Or   ondansetron (ZOFRAN) injection 4 mg (has no administration in time range)   melatonin tablet 3 mg (has no administration in time range)   sodium chloride 0.9 % infusion (has no administration in time range)   morphine injection 2 mg (2 mg Intravenous Given 11/2/23 1716)   ondansetron (ZOFRAN) injection 4 mg (4 mg Intravenous Given 11/2/23 1715)   sodium chloride 0.9 % bolus 500 mL (0 mL Intravenous Stopped 11/2/23 1946)         ORDERS PLACED DURING THIS VISIT:  Orders Placed This Encounter   Procedures    Clostridioides difficile Toxin - Stool, Per Rectum    Gastrointestinal Panel, PCR - Stool, Per Rectum    Clostridioides difficile Toxin, PCR - Stool, Per Rectum    CT Abdomen Pelvis Without Contrast    Comprehensive Metabolic Panel    Lipase    CBC Auto Differential    Urinalysis With Microscopic If Indicated (No Culture) - Urine, Clean Catch    Manual Differential    Basic Metabolic Panel    CBC (No Diff)    Diet: Cardiac Diets; Healthy Heart (2-3 Na+); Texture: Regular Texture (IDDSI 7); Fluid Consistency: Thin (IDDSI 0)    Monitor Blood Pressure    Pulse Oximetry, Continuous    Vital Signs    Up with assistance    Daily Weights    Strict Intake & Output    Oral Care    Place Sequential Compression Device    Maintain Sequential  Compression Device    Code Status and Medical Interventions:    LHAMADA (on-call MD unless specified) Details    Inpatient Palliative Care Team Consult    Inpatient Pulmonology Consult    Inpatient Nephrology Consult    Patient Isolation Contact Spore    Insert Peripheral IV    Inpatient Admission    CBC & Differential         PROGRESS, DATA ANALYSIS, CONSULTS, AND MEDICAL DECISION MAKING    All labs have been independently interpreted by me.  All radiology studies have been reviewed by me and discussed with radiologist dictating the report.   EKG's independently viewed and interpreted by me.  Discussion below represents my analysis of pertinent findings related to patient's condition, differential diagnosis, treatment plan and final disposition.    Differential diagnosis includes but is not limited to appendicitis, bowel obstruction, C. difficile, colitis.    ED Course as of 11/02/23 2023   Thu Nov 02, 2023   1707 Creatinine(!): 2.96 [TR]   1708 BUN(!): 55 [TR]   1708 The patient has acute renal failure based on changing creatinine from 2 weeks ago.  She will require admission. [TR]   1735 WBC(!!): 44.77  Chronic and related to myeloproliferative disorder [TR]   1845 CT Abdomen Pelvis Without Contrast  My independent interpretation of the CT of the abdomen pelvis is no bowel obstruction.  No free air. [TR]   1934 Reviewed work-up and findings with the patient and family at the bedside.  Answered all questions.  Her CT shows no acute process causing abdominal pain.  She has not been able to produce stool or urine here.  Her laboratory evaluation does show acute renal failure.  Plan admission for further evaluation and treatment.  The patient and family are agreeable. [TR]   2023 Discussed with Dr. Macias with [facility].  She agrees to admit. [TR]      ED Course User Index  [TR] Tommy Clemente MD                  AS OF 20:23 EDT VITALS:    BP - 145/58  HR - 100  TEMP - 97.9 °F (36.6 °C)  O2 SATS - 96%        DIAGNOSIS  Final  diagnoses:   Acute renal failure, unspecified acute renal failure type   Acute abdominal pain   Nausea vomiting and diarrhea         DISPOSITION  ED Disposition       ED Disposition   Decision to Admit    Condition   --    Comment   Level of Care: Telemetry [5]   Diagnosis: Acute kidney injury [790641]   Admitting Physician: ADEN BRAVO [[id number]]   Attending Physician: ADEN BRAVO [[id number]]   Certification: I certify that inpatient services are medically necessary for this patient for a duration of greater than two midnights. See H&P and MD Progress Notes for additional information about the patient's course of treatment.                    Note Disclaimer: At [facility], we believe that sharing information builds trust and better relationships. You are receiving this note because you recently visited [facility]. It is possible you will see health information before a provider has talked with you about it. This kind of information can be easy to misunderstand. To help you fully understand what it means for your health, we urge you to discuss this note with your provider.         Tommy Clemente MD  23       Tommy Clemente MD  23      Electronically signed by Tommy Clemente MD at 23       Diana Powell RN at 23 1616          Patient from assisted living, had abd pain and cramping since Monday and now having nausea vomiting and diarrhea and fatigue.     Electronically signed by Diana Powell RN at 23 1616          Physician Progress Notes (last 48 hours)        Junior Doan MD at 23 1305              Name: Catherine Boyer ADMIT: 2023   : 1940  PCP: Kristi Moreau APRN    MRN: [id number] LOS: 1 days   AGE/SEX: 83 y.o. female  ROOM: [address]     This is an 83-year-old woman who comes to the hospital for abdominal pain.  CT of the abdomen pelvis obtained yesterday showed colonic diverticulosis without any  inflammatory issues as well as a new indeterminant density in the lingula.  And reticulonodular pulmonary opacities in the bilateral lower lobes.    Objective   Objective   Vital Signs  Temp:  [97.9 °F (36.6 °C)-98.2 °F (36.8 °C)] 98.2 °F (36.8 °C)  Heart Rate:  [] 93  Resp:  [16-20] 18  BP: ()/(46-67) 104/46  SpO2:  [96 %-100 %] 100 %  on  Flow (L/min):  [2] 2;   Device (Oxygen Therapy): nasal cannula  Body mass index is 24.01 kg/m².  Physical Exam  Constitutional:       General: She is not in acute distress.  HENT:      Head: Normocephalic and atraumatic.      Mouth/Throat:      Mouth: Mucous membranes are dry.   Cardiovascular:      Rate and Rhythm: Normal rate and regular rhythm.   Pulmonary:      Effort: Pulmonary effort is normal. No respiratory distress.   Abdominal:      General: There is no distension.      Palpations: Abdomen is soft.      Tenderness: There is no abdominal tenderness.   Skin:     General: Skin is warm and dry.   Neurological:      General: No focal deficit present.      Mental Status: She is alert and oriented to person, place, and time.         Results Review     I reviewed the patient's new clinical results.  Results from last 7 days   Lab Units 11/03/23  0540 11/02/23  1631   WBC 10*3/mm3 39.23* 44.77*   HEMOGLOBIN g/dL 7.6* 8.1*   PLATELETS 10*3/mm3 413 522*     Results from last 7 days   Lab Units 11/03/23  0540 11/02/23  1631   SODIUM mmol/L 135* 132*   POTASSIUM mmol/L 3.9 4.2   CHLORIDE mmol/L 101 98   CO2 mmol/L 20.2* 20.0*   BUN mg/dL 53* 55*   CREATININE mg/dL 2.44* 2.96*   GLUCOSE mg/dL 93 148*   Estimated Creatinine Clearance: 16.8 mL/min (A) (by C-G formula based on SCr of 2.44 mg/dL (H)).  Results from last 7 days   Lab Units 11/02/23  1631   ALBUMIN g/dL 3.7   BILIRUBIN mg/dL 0.5   ALK PHOS U/L 115   AST (SGOT) U/L 22   ALT (SGPT) U/L 17     Results from last 7 days   Lab Units 11/03/23  0540 11/02/23  1631   CALCIUM mg/dL 8.4* 8.5*   ALBUMIN g/dL  --  3.7      "  COVID19   Date Value Ref Range Status   08/13/2023 Not Detected Not Detected - Ref. Range Final   10/29/2022 Detected (C) Not Detected - Ref. Range Final     No results found for: \"HGBA1C\", \"POCGLU\"  No results found for this or any previous visit.      CT Abdomen Pelvis Without Contrast  Narrative: CT ABDOMEN PELVIS WO CONTRAST-     INDICATIONS: Abdominal pain     TECHNIQUE: Radiation dose reduction techniques were utilized, including  automated exposure control and exposure modulation based on body size.  Unenhanced ABDOMEN AND PELVIS CT     COMPARISON: 8/13/2023     FINDINGS:     The gallbladder is surgically absent.     Mild nonspecific nodular thickening of the adrenal glands is seen.     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 A small arterial calcification is  apparent at the left renal hilum. No hydronephrosis.     Otherwise unremarkable unenhanced appearance of the liver, spleen,  adrenal glands, pancreas, kidneys, bladder.     No bowel obstruction or abnormal bowel thickening is identified. Colonic  diverticula are seen that do not appear inflamed. Minimal hiatal hernia  is seen. Pelvic floor relaxation/rectocele is apparent, correlate with  clinical exam.     No free intraperitoneal gas or free fluid. Umbilical hernia of fat is  noted.     Scattered small mesenteric and para-aortic lymph nodes are seen that are  not significant by size criteria.     Abdominal aorta is not aneurysmal. Aortic and other arterial  calcifications are present.     The lung bases show a new pleural-based density in the lingula, 1.3 x  3.3 cm on axial image 8, that could be rounded atelectasis or  potentially a focus of infection or even neoplasm, correlate clinically,  PET/CT correlation can be obtained as indicated, in addition to  continued CT follow-up. Reticulonodular opacities in the lower lobes may  be inflammatory/infectious in etiology, follow-up recommended.     Degenerative changes are seen in the spine. Stable " sclerotic foci in the  left aspect of the sacrum, right femur, nonspecific, may represent bone  island. No acute fracture is identified.           Impression:       1. A new indeterminate density in the lingula, as described.  Reticulonodular pulmonary opacities in the bilateral lower lobes.  Follow-up evaluation advised.     2. Colonic diverticulosis. No acute inflammatory process of bowel is  identified. Follow-up as indications persist.     3. No urolithiasis or hydronephrosis. Small arterial calcification of  the left renal hilum.     This report was finalized on 11/2/2023 7:22 PM by Dr. Reilly Ritter M.D on Workstation: [id number]       Scheduled Medications  [Held by provider] apixaban, 5 mg, Oral, BID  [Held by provider] clopidogrel, 75 mg, Oral, Daily  famotidine, 20 mg, Oral, Daily  latanoprost, 1 drop, Both Eyes, Nightly  metoprolol succinate XL, 25 mg, Oral, Daily  Momelotinib Dihydrochloride, 200 mg, Oral, Daily  saccharomyces boulardii, 250 mg, Oral, BID  senna-docusate sodium, 2 tablet, Oral, BID  sertraline, 100 mg, Oral, Daily    Infusions  sodium chloride, 75 mL/hr, Last Rate: 75 mL/hr (11/03/23 0002)    Diet  Diet: Cardiac Diets; Healthy Heart (2-3 Na+); Texture: Regular Texture (IDDSI 7); Fluid Consistency: Thin (IDDSI 0)      Assessment/Plan     Active Hospital Problems    Diagnosis  POA    **Acute kidney injury [N17.9]  Yes    Myelofibrosis [D75.81]  Yes    Personal history of transient ischemic attack (TIA), 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       83 y.o. female admitted with Acute kidney injury.    Acute renal  failure  Most likely secondary to dehydration.  Hold diuretics.  Nephrology following.    Abnormal CT scan  Concern for an indeterminant mass from the lingula.  CT chest ordered with potential need for biopsy    Congestive heart failure  Continue metoprolol.  Hold Lasix    Coronary artery disease  Paroxysmal atrial fibrillation  Hypertension  Eliquis and Plavix have been held.  Continue metoprolol.  Hold Lasix.    Myeloproliferative disorder  Leukocytosis    SCDs for DVT prophylaxis.  Full code.  Discussed with patient, family, and nursing staff.  Anticipate discharge home with  vs SNU facility in 2-3 days.      Junior Doan MD  [facility]  23  13:12 EDT    Copied text on this note has been reviewed and updated as of 23        Electronically signed by Junior Doan MD at 23 1312          Consult Notes (last 48 hours)        Darrell Hanley MD at 23 1241        Consult Orders    1. Inpatient Nephrology Consult [[id number]] ordered by Jaimee Macias MD at 23                                                                                                                 [facility]                                                                                             Nephrology Initial Consult Note    Patient Identification:  Name: Catherine Boyer MRN: [id number]  Age: 83 y.o. : 1940  Sex: female  Date:11/3/2023    Requesting Physician: As per consult order.  Reason for Consultation: KARLI  Information from:patient/ family/ chart      History of Present Illness: This is a 83 y.o. year old female with no prior history of chronic kidney disease, medical history includes myelofibrosis, diastolic CHF.  She presented to the ER last night with low oral intake, nausea vomiting and malaise for the last several days.  CT showed a lung mass she was also noted to have renal failure and was started on IV fluids.  Initial creatinine 2.9  has improved down to 2.4 with IV fluids.  Initial WBC count 44,000, she chronically runs 30-50,000.  Home meds include Lasix 40 mg daily, no ACE inhibitor/ARB/NSAIDs.  No hypotension, initial soft BP on admission.    The following medical history and medications personally reviewed by me:    Problem List:     Acute kidney injury      Past Medical History:  Past Medical History:   Diagnosis Date    Atherosclerosis of abdominal aorta 02/05/2023    Atrial fibrillation     Breast cancer     CAD (coronary artery disease)     NSTEMI 2/2022: 90% ostial LAD, 99% D1, 70% mid-distal LAD (medical therapy). She received two stents (2.5x18 and 2.5x26mm Finesse LEFTY) but I don't know which one went to which lesion.    Carotid atherosclerosis     Cholecystitis 11/22/2022    Added automatically from request for surgery [id number]    Chronic diastolic (congestive) heart failure     COVID 10/29/2022    GERD (gastroesophageal reflux disease)     Glaucoma     History of cataract     Hypertension     Microcytic anemia     per Dr. oleg pate office  note 6/30/22-dd    Myeloproliferative disorder     JAK2 positive    Nonbacterial thrombotic endocarditis     6/2021: 4x5mm vegetation on the ventricular surface of the anterior MV, negative blood cultures    Porcelain gallbladder     PUD (peptic ulcer disease)     TIA (transient ischemic attack)     Type 2 diabetes mellitus     Type 2 diabetes mellitus with circulatory disorder, without long-term current use of insulin 07/14/2022    Upper GI bleed        Past Surgical History:  Past Surgical History:   Procedure Laterality Date    BREAST LUMPECTOMY  1999    CARDIAC CATHETERIZATION N/A 09/02/2022    Procedure: Coronary angiography;  Surgeon: Guero Verde MD;  Location:  [facility];  Service: Cardiovascular;  Laterality: N/A;    CARDIAC CATHETERIZATION N/A 09/02/2022    Procedure: Stent LEFTY coronary;  Surgeon: Guero Verde MD;  Location:  [facility];   Service: Cardiovascular;  Laterality: N/A;    CATARACT EXTRACTION  2011    CHOLECYSTECTOMY      CHOLECYSTECTOMY WITH INTRAOPERATIVE CHOLANGIOGRAM N/A 11/28/2022    Procedure: CHOLECYSTECTOMY LAPAROSCOPIC INTRAOPERATIVE CHOLANGIOGRAM;  Surgeon: Dani Grover Jr., MD;  Location: [facility] MAIN OR;  Service: General;  Laterality: N/A;    COLONOSCOPY N/A 02/09/2023    Procedure: COLONOSCOPY TO CECUM & T.I.;  Surgeon: Guero Ferris MD;  Location: [facility] ENDOSCOPY;  Service: Gastroenterology;  Laterality: N/A;  PRE- MELENA, GI BLEED  POST- DIVERTICULOSIS, INT HEMORRHOIDS    ENDOSCOPY N/A 01/25/2023    Procedure: ESOPHAGOGASTRODUODENOSCOPY WITH BIOPSIES;  Surgeon: Charles Diaz MD;  Location: [facility] ENDOSCOPY;  Service: Gastroenterology;  Laterality: N/A;  pre: abd pain, nausea  post: hiatal hernia, mild gastritis, sloughing of the esophagus    ENTEROSCOPY SMALL BOWEL N/A 02/09/2023    Procedure: ENTEROSCOPY SMALL BOWEL;  Surgeon: Guero Ferris MD;  Location: [facility] ENDOSCOPY;  Service: Gastroenterology;  Laterality: N/A;  PRE- MELENA, GI BLEED  POST- HIATAL HERNIA    JOINT REPLACEMENT      UPPER GASTROINTESTINAL ENDOSCOPY          Home Meds:   Medications Prior to Admission   Medication Sig Dispense Refill Last Dose    acetaminophen (TYLENOL) 500 MG tablet Take 1 tablet by mouth As Needed.   11/1/2023    apixaban (ELIQUIS) 5 MG tablet tablet Take 1 tablet by mouth 2 (Two) Times a Day. 180 tablet 3 11/2/2023    clopidogrel (PLAVIX) 75 MG tablet TAKE 1 TABLET DAILY 30 tablet 11 11/2/2023    famotidine (PEPCID) 20 MG tablet Take 1 tablet by mouth Daily.   11/2/2023    furosemide (LASIX) 40 MG tablet TAKE 1 TABLET DAILY 90 tablet 3 11/2/2023    hydrocortisone-bacitracin-zinc oxide-nystatin (MAGIC BARRIER) Apply 1 application topically to the appropriate area as directed 2 (Two) Times a Day.   11/2/2023    latanoprost (XALATAN) 0.005 % ophthalmic solution Administer 1 drop to both eyes.   11/2/2023    metoprolol  succinate XL (TOPROL-XL) 25 MG 24 hr tablet Take 1 tablet by mouth Daily. 90 tablet 2 11/2/2023    Momelotinib Dihydrochloride (OJJAARA) 200 MG tablet Take 1 tablet by mouth Daily. 28 tablet 3 11/2/2023    ondansetron (Zofran) 4 MG tablet Take 1 tablet by mouth Every 8 (Eight) Hours As Needed for Nausea or Vomiting. 90 tablet 0 Past Week    saccharomyces boulardii (FLORASTOR) 250 MG capsule Take 1 capsule by mouth 2 (Two) Times a Day. 60 capsule 0 11/2/2023    sertraline (ZOLOFT) 100 MG tablet Take 1 tablet by mouth Daily.   11/2/2023    simethicone (MYLICON) 80 MG chewable tablet Chew 1 tablet by mouth 4 (Four) Times a Day As Needed for Flatulence. 100 tablet 0 Past Week    sucralfate (CARAFATE) 1 g tablet Take 1 tablet by mouth Every 12 (Twelve) Hours.   11/2/2023    loperamide (IMODIUM) 2 MG capsule Take 1 capsule by mouth 4 (Four) Times a Day As Needed for Diarrhea. 120 capsule 0     mupirocin (BACTROBAN) 2 % ointment APPLY TOPICALLY TWICE DAILY TO WOUND          Current Meds:   Current Facility-Administered Medications   Medication Dose Route Frequency Provider Last Rate Last Admin    acetaminophen (TYLENOL) tablet 650 mg  650 mg Oral Q4H PRN Jaimee Macias MD        [Held by provider] apixaban (ELIQUIS) tablet 5 mg  5 mg Oral BID Jaimee Macias MD        sennosides-docusate (PERICOLACE) 8.6-50 MG per tablet 2 tablet  2 tablet Oral BID Jaimee Macias MD        And    polyethylene glycol (MIRALAX) packet 17 g  17 g Oral Daily PRN Jaimee Macias MD        And    bisacodyl (DULCOLAX) EC tablet 5 mg  5 mg Oral Daily PRN Jaimee Macias MD        And    bisacodyl (DULCOLAX) suppository 10 mg  10 mg Rectal Daily PRN Jaimee Macias MD        [Held by provider] clopidogrel (PLAVIX) tablet 75 mg  75 mg Oral Daily Jaimee Macias MD        famotidine (PEPCID) tablet 20 mg  20 mg Oral Daily Gilberto, Jaimee Mckeon MD   20 mg at 11/03/23 1002    latanoprost (XALATAN)  "0.005 % ophthalmic solution 1 drop  1 drop Both Eyes Nightly Jaimee Macias MD   1 drop at 11/03/23 0151    melatonin tablet 3 mg  3 mg Oral Nightly PRN Jaimee Macias MD        metoprolol succinate XL (TOPROL-XL) 24 hr tablet 25 mg  25 mg Oral Daily Jaimee Macias MD   25 mg at 11/03/23 1002    Momelotinib Dihydrochloride (OJJAARA) 200 mg  200 mg Oral Daily Jaimee Macias MD   200 mg at 11/03/23 1002    ondansetron (ZOFRAN) tablet 4 mg  4 mg Oral Q6H PRN Jaimee Macias MD        Or    ondansetron (ZOFRAN) injection 4 mg  4 mg Intravenous Q6H PRN Jaimee Macias MD        saccharomyces boulardii (FLORASTOR) capsule 250 mg  250 mg Oral BID Jaimee Macias MD   250 mg at 11/03/23 1002    sertraline (ZOLOFT) tablet 100 mg  100 mg Oral Daily Jaimee Macias MD   100 mg at 11/03/23 1002    sodium chloride 0.9 % flush 10 mL  10 mL Intravenous PRN Tommy Clemente MD        sodium chloride 0.9 % infusion  75 mL/hr Intravenous Continuous Jaimee Macias MD 75 mL/hr at 11/03/23 0002 75 mL/hr at 11/03/23 0002       Allergies:  Allergies   Allergen Reactions    Aspirin Unknown - Low Severity    Oxycodone Unknown - Low Severity    Hydroxyurea Other (See Comments)     Her hemoglobin drop and was stop in February 2022 and start taking Jakafi    Diphenhydramine Hcl Anxiety and Unknown (See Comments)     Benadryl IVP       Social History:   Social History     Socioeconomic History    Marital status:    Tobacco Use    Smoking status: Former     Packs/day: 1.00     Years: 20.00     Additional pack years: 0.00     Total pack years: 20.00     Types: Cigarettes     Passive exposure: Past    Smokeless tobacco: Never    Tobacco comments:     30  years ago   Vaping Use    Vaping Use: Never used   Substance and Sexual Activity    Alcohol use: Yes     Comment: \"rare\"    Drug use: Never    Sexual activity: Defer        Family History:  Family History   Problem Relation " Age of Onset    Ovarian cancer Mother     Lung cancer Father     Lung cancer Sister     Malig Hyperthermia Neg Hx         Review of Systems: as per HPI, in addition:    General:      Complains of weakness / fatigue,                       No fevers / chills                       no weight loss  HEENT:       no dysphagia / odynophagia  Neck:           normal range of motion, no swelling  Respiratory: no cough / congestion                      No shortness of air                       No wheezing  CV:              No chest pain                       No palpitations  Abdomen/GI: no nausea / vomiting                      No diarrhea / constipation                      No abdominal pain  :             no dysuria / urinary frequency                       No urgency, normal output  Endocrine:   no polyuria / polydipsia,                      No heat or cold intolerance  Skin:           no rashes or skin breakdown   Vascular:   No edema                     No claudication  Psych:        no depression/ anxiety  Neuro:        no focal weakness, no seizures  Musculoskeletal: no joint pain or deformities      Physical Exam:  Vitals:   Temp (24hrs), Av.1 °F (36.7 °C), Min:97.9 °F (36.6 °C), Max:98.2 °F (36.8 °C)    /46 (BP Location: Left arm, Patient Position: Lying)   Pulse 93   Temp 98.2 °F (36.8 °C) (Oral)   Resp 18   Wt 63 kg (138 lb 14.2 oz)   SpO2 100%   BMI 24.01 kg/m²   Intake/Output:     Intake/Output Summary (Last 24 hours) at 11/3/2023 1242  Last data filed at 2023 1946  Gross per 24 hour   Intake 500 ml   Output --   Net 500 ml        Wt Readings from Last 1 Encounters:   23 0556 63 kg (138 lb 14.2 oz)       Exam:    General Appearance:  Awake, alert, oriented x3, no acute distress  Chronically ill-appearing   Head and Face:  Normocephalic, atraumatic, mucus membranes moist, oropharynx clear   Eyes:  No icterus, pupils equal round and reactive to light, extraocular movements intact    ENMT:  Moist mucosa, tongue symmetric    Neck: Supple  no jugular venous distention  no thyromegaly   Pulmonary:  Respiratory effort: Normal  Auscultation of lungs: Clear bilaterally  No wheezes  No rhonchi  Good air movement, good expansion   Chest wall:  No tenderness or deformity   Cardiovascular:  Auscultation of the heart: Normal rhythm, no murmurs  No edema of bilateral lower extremities   Abdomen:  Abdomen: soft, non-tender, normal bowel sounds all four quadrants, no masses   Liver and spleen: no hepatosplenomegaly   Musculoskeletal: Digits and nails: normal  Normal range of motion  No joint swelling or gross deformities    Skin: Skin inspection: color normal, no visible rashes or lesions  Skin palpation: texture, turgor normal, no palpable lesions   Lymphatic:  no cervical lymphadenopathy    Psychiatric: Judgement and insight: normal  Orientation to person place and time: normal  Mood and affect: normal       DATA:  Radiology and Labs:  The following labs independently reviewed by me, additional AM labs ordered  Old records independently reviewed showing normal baseline creatinine  The following radiologic studies independently viewed by me, findings CT abdomen and pelvis showed new lingular lung mass, no hydronephrosis  Interval notes, chart personally reviewed by me.  I have reviewed and summarized old records as detailed above  Plan of care discussed with patient, family  New problems include KARLI      Risk/ complexity of medical care/ medical decision making: High complexity, new KARLI  Chronic illness with severe exacerbation or progression: Lung disease      Labs:   Recent Results (from the past 24 hour(s))   Comprehensive Metabolic Panel    Collection Time: 11/02/23  4:31 PM    Specimen: Blood   Result Value Ref Range    Glucose 148 (H) 65 - 99 mg/dL    BUN 55 (H) 8 - 23 mg/dL    Creatinine 2.96 (H) 0.57 - 1.00 mg/dL    Sodium 132 (L) 136 - 145 mmol/L    Potassium 4.2 3.5 - 5.2 mmol/L    Chloride 98 98 - 107  mmol/L    CO2 20.0 (L) 22.0 - 29.0 mmol/L    Calcium 8.5 (L) 8.6 - 10.5 mg/dL    Total Protein 6.1 6.0 - 8.5 g/dL    Albumin 3.7 3.5 - 5.2 g/dL    ALT (SGPT) 17 1 - 33 U/L    AST (SGOT) 22 1 - 32 U/L    Alkaline Phosphatase 115 39 - 117 U/L    Total Bilirubin 0.5 0.0 - 1.2 mg/dL    Globulin 2.4 gm/dL    A/G Ratio 1.5 g/dL    BUN/Creatinine Ratio 18.6 7.0 - 25.0    Anion Gap 14.0 5.0 - 15.0 mmol/L    eGFR 15.0 (L) >60.0 mL/min/1.73   Lipase    Collection Time: 11/02/23  4:31 PM    Specimen: Blood   Result Value Ref Range    Lipase 34 13 - 60 U/L   CBC Auto Differential    Collection Time: 11/02/23  4:31 PM    Specimen: Blood   Result Value Ref Range    WBC 44.77 (C) 3.40 - 10.80 10*3/mm3    RBC 2.74 (L) 3.77 - 5.28 10*6/mm3    Hemoglobin 8.1 (L) 12.0 - 15.9 g/dL    Hematocrit 24.6 (L) 34.0 - 46.6 %    MCV 89.8 79.0 - 97.0 fL    MCH 29.6 26.6 - 33.0 pg    MCHC 32.9 31.5 - 35.7 g/dL    RDW 18.0 (H) 12.3 - 15.4 %    RDW-SD 57.0 (H) 37.0 - 54.0 fl    MPV 10.2 6.0 - 12.0 fL    Platelets 522 (H) 140 - 450 10*3/mm3   Manual Differential    Collection Time: 11/02/23  4:31 PM    Specimen: Blood   Result Value Ref Range    Neutrophil % 87.2 (H) 42.7 - 76.0 %    Lymphocyte % 6.4 (L) 19.6 - 45.3 %    Monocyte % 3.2 (L) 5.0 - 12.0 %    Eosinophil % 1.1 0.3 - 6.2 %    Basophil % 2.1 (H) 0.0 - 1.5 %    Neutrophils Absolute 39.04 (H) 1.70 - 7.00 10*3/mm3    Lymphocytes Absolute 2.87 0.70 - 3.10 10*3/mm3    Monocytes Absolute 1.43 (H) 0.10 - 0.90 10*3/mm3    Eosinophils Absolute 0.49 (H) 0.00 - 0.40 10*3/mm3    Basophils Absolute 0.94 (H) 0.00 - 0.20 10*3/mm3    Anisocytosis Mod/2+ None Seen    [place] Cells Slight/1+ None Seen    Dacrocytes Slight/1+ None Seen    Macrocytes Slight/1+ None Seen    Poikilocytes Mod/2+ None Seen    Stomatocytes Mod/2+ None Seen    WBC Morphology Normal Normal    Platelet Morphology Normal Normal   Basic Metabolic Panel    Collection Time: 11/03/23  5:40 AM    Specimen: Blood   Result Value Ref Range     Glucose 93 65 - 99 mg/dL    BUN 53 (H) 8 - 23 mg/dL    Creatinine 2.44 (H) 0.57 - 1.00 mg/dL    Sodium 135 (L) 136 - 145 mmol/L    Potassium 3.9 3.5 - 5.2 mmol/L    Chloride 101 98 - 107 mmol/L    CO2 20.2 (L) 22.0 - 29.0 mmol/L    Calcium 8.4 (L) 8.6 - 10.5 mg/dL    BUN/Creatinine Ratio 21.7 7.0 - 25.0    Anion Gap 13.8 5.0 - 15.0 mmol/L    eGFR 19.0 (L) >60.0 mL/min/1.73   CBC (No Diff)    Collection Time: 11/03/23  5:40 AM    Specimen: Blood   Result Value Ref Range    WBC 39.23 (C) 3.40 - 10.80 10*3/mm3    RBC 2.61 (L) 3.77 - 5.28 10*6/mm3    Hemoglobin 7.6 (L) 12.0 - 15.9 g/dL    Hematocrit 24.1 (L) 34.0 - 46.6 %    MCV 92.3 79.0 - 97.0 fL    MCH 29.1 26.6 - 33.0 pg    MCHC 31.5 31.5 - 35.7 g/dL    RDW 17.8 (H) 12.3 - 15.4 %    RDW-SD 59.4 (H) 37.0 - 54.0 fl    MPV 10.3 6.0 - 12.0 fL    Platelets 413 140 - 450 10*3/mm3       Radiology:  Imaging Results (Last 24 Hours)       Procedure Component Value Units Date/Time    CT Abdomen Pelvis Without Contrast [[id number]] Collected: 11/02/23 1912     Updated: 11/02/23 1925    Narrative:      CT ABDOMEN PELVIS WO CONTRAST-     INDICATIONS: Abdominal pain     TECHNIQUE: Radiation dose reduction techniques were utilized, including  automated exposure control and exposure modulation based on body size.  Unenhanced ABDOMEN AND PELVIS CT     COMPARISON: 8/13/2023     FINDINGS:     The gallbladder is surgically absent.     Mild nonspecific nodular thickening of the adrenal glands is seen.     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 A small arterial calcification is  apparent at the left renal hilum. No hydronephrosis.     Otherwise unremarkable unenhanced appearance of the liver, spleen,  adrenal glands, pancreas, kidneys, bladder.     No bowel obstruction or abnormal bowel thickening is identified. Colonic  diverticula are seen that do not appear inflamed. Minimal hiatal hernia  is seen. Pelvic floor relaxation/rectocele is apparent, correlate with  clinical exam.     No free  intraperitoneal gas or free fluid. Umbilical hernia of fat is  noted.     Scattered small mesenteric and para-aortic lymph nodes are seen that are  not significant by size criteria.     Abdominal aorta is not aneurysmal. Aortic and other arterial  calcifications are present.     The lung bases show a new pleural-based density in the lingula, 1.3 x  3.3 cm on axial image 8, that could be rounded atelectasis or  potentially a focus of infection or even neoplasm, correlate clinically,  PET/CT correlation can be obtained as indicated, in addition to  continued CT follow-up. Reticulonodular opacities in the lower lobes may  be inflammatory/infectious in etiology, follow-up recommended.     Degenerative changes are seen in the spine. Stable sclerotic foci in the  left aspect of the sacrum, right femur, nonspecific, may represent bone  island. No acute fracture is identified.             Impression:            1. A new indeterminate density in the lingula, as described.  Reticulonodular pulmonary opacities in the bilateral lower lobes.  Follow-up evaluation advised.     2. Colonic diverticulosis. No acute inflammatory process of bowel is  identified. Follow-up as indications persist.     3. No urolithiasis or hydronephrosis. Small arterial calcification of  the left renal hilum.     This report was finalized on 11/2/2023 7:22 PM by Dr. Reilly Ritter M.D on Workstation: [id number]                  ASSESSMENT:   Acute kidney injury, new with normal baseline renal function, likely prerenal  Chronic diastolic CHF  Nausea vomiting and dehydration  Left lingular lung mass  Myeloproliferative disorder  Chronic leukocytosis  Atrial fibrillation  Diabetes mellitus      DISCUSSION/PLAN:   Renal function seems to be improving overnight with IV fluids  Continue to hold diuretics and cautious hydration  Monitor volume status closely with underlying CHF  CT showed no hydronephrosis  Will check urine studies and follow-up a.m.  labs    Continue to monitor electrolytes and volume closely, avoid IV contrast and nephrotoxic medications     I appreciate the consult request.  Please send me a secure chat message with any nonurgent questions regarding patient care.  For any urgent patient care issues please call my office number below.      Darrell Hanley MD  [facility]  Office phone number: [phone]  Answering service phone number: [phone]      11/3/2023        Dictation via Dragon dictation software      Electronically signed by Darrell Hanley MD at 11/03/23 1249       Leilani Wheeler MD at 11/03/23 113          CONSULT NOTE    Patient Identification:  Catherine Boyer  83 y.o.  female  1940  [id number]            Requesting physician: Hospitalist    Reason for Consultation: Left lingular mass    CC:     History of Present Illness:  83-year-old female with multiple medical issues including myelofibrosis diastolic heart failure admitted with nausea vomiting malaise for the last 3 days.  She had a CT abdomen done and lower cuts showed left lingular mass versus consolidation.  Patient not the best of historian but denies any purulent sputum or significant cough at this time.  She is requiring nasal cannula oxygen.  She does have history of smoking quit 20+ years ago but smoked for 20+ years in the past.  No hemoptysis or weight loss reported.  She also has underlying history of myelofibrosis.    Review of Systems  As above rest is negative  Past Medical History:  Past Medical History:   Diagnosis Date    Atherosclerosis of abdominal aorta 02/05/2023    Atrial fibrillation     Breast cancer     CAD (coronary artery disease)     NSTEMI 2/2022: 90% ostial LAD, 99% D1, 70% mid-distal LAD (medical therapy). She received two stents (2.5x18 and 2.5x26mm Finesse LEFTY) but I don't know which one went to which lesion.    Carotid atherosclerosis     Cholecystitis 11/22/2022    Added automatically from request for surgery  [id number]    Chronic diastolic (congestive) heart failure     COVID 10/29/2022    GERD (gastroesophageal reflux disease)     Glaucoma     History of cataract     Hypertension     Microcytic anemia     per Dr. oleg pate office  note 6/30/22-dd    Myeloproliferative disorder     JAK2 positive    Nonbacterial thrombotic endocarditis     6/2021: 4x5mm vegetation on the ventricular surface of the anterior MV, negative blood cultures    Porcelain gallbladder     PUD (peptic ulcer disease)     TIA (transient ischemic attack)     Type 2 diabetes mellitus     Type 2 diabetes mellitus with circulatory disorder, without long-term current use of insulin 07/14/2022    Upper GI bleed        Past Surgical History:  Past Surgical History:   Procedure Laterality Date    BREAST LUMPECTOMY  1999    CARDIAC CATHETERIZATION N/A 09/02/2022    Procedure: Coronary angiography;  Surgeon: Guero Verde MD;  Location: [facility] CATH INVASIVE LOCATION;  Service: Cardiovascular;  Laterality: N/A;    CARDIAC CATHETERIZATION N/A 09/02/2022    Procedure: Stent LEFTY coronary;  Surgeon: Guero Verde MD;  Location: [facility] CATH INVASIVE LOCATION;  Service: Cardiovascular;  Laterality: N/A;    CATARACT EXTRACTION  2011    CHOLECYSTECTOMY      CHOLECYSTECTOMY WITH INTRAOPERATIVE CHOLANGIOGRAM N/A 11/28/2022    Procedure: CHOLECYSTECTOMY LAPAROSCOPIC INTRAOPERATIVE CHOLANGIOGRAM;  Surgeon: Dani Grover Jr., MD;  Location: [facility] MAIN OR;  Service: General;  Laterality: N/A;    COLONOSCOPY N/A 02/09/2023    Procedure: COLONOSCOPY TO CECUM & T.I.;  Surgeon: Guero Ferris MD;  Location: [facility] ENDOSCOPY;  Service: Gastroenterology;  Laterality: N/A;  PRE- MELENA, GI BLEED  POST- DIVERTICULOSIS, INT HEMORRHOIDS    ENDOSCOPY N/A 01/25/2023    Procedure: ESOPHAGOGASTRODUODENOSCOPY WITH BIOPSIES;  Surgeon: Charles Diaz MD;  Location: [facility] ENDOSCOPY;  Service: Gastroenterology;  Laterality: N/A;  pre: abd pain, nausea  post: hiatal hernia, mild  gastritis, sloughing of the esophagus    ENTEROSCOPY SMALL BOWEL N/A 02/09/2023    Procedure: ENTEROSCOPY SMALL BOWEL;  Surgeon: Guero Ferris MD;  Location: [facility] ENDOSCOPY;  Service: Gastroenterology;  Laterality: N/A;  PRE- MELENA, GI BLEED  POST- HIATAL HERNIA    JOINT REPLACEMENT      UPPER GASTROINTESTINAL ENDOSCOPY          Home Meds:  Medications Prior to Admission   Medication Sig Dispense Refill Last Dose    acetaminophen (TYLENOL) 500 MG tablet Take 1 tablet by mouth As Needed.   11/1/2023    apixaban (ELIQUIS) 5 MG tablet tablet Take 1 tablet by mouth 2 (Two) Times a Day. 180 tablet 3 11/2/2023    clopidogrel (PLAVIX) 75 MG tablet TAKE 1 TABLET DAILY 30 tablet 11 11/2/2023    famotidine (PEPCID) 20 MG tablet Take 1 tablet by mouth Daily.   11/2/2023    furosemide (LASIX) 40 MG tablet TAKE 1 TABLET DAILY 90 tablet 3 11/2/2023    hydrocortisone-bacitracin-zinc oxide-nystatin (MAGIC BARRIER) Apply 1 application topically to the appropriate area as directed 2 (Two) Times a Day.   11/2/2023    latanoprost (XALATAN) 0.005 % ophthalmic solution Administer 1 drop to both eyes.   11/2/2023    metoprolol succinate XL (TOPROL-XL) 25 MG 24 hr tablet Take 1 tablet by mouth Daily. 90 tablet 2 11/2/2023    Momelotinib Dihydrochloride (OJJAARA) 200 MG tablet Take 1 tablet by mouth Daily. 28 tablet 3 11/2/2023    ondansetron (Zofran) 4 MG tablet Take 1 tablet by mouth Every 8 (Eight) Hours As Needed for Nausea or Vomiting. 90 tablet 0 Past Week    saccharomyces boulardii (FLORASTOR) 250 MG capsule Take 1 capsule by mouth 2 (Two) Times a Day. 60 capsule 0 11/2/2023    sertraline (ZOLOFT) 100 MG tablet Take 1 tablet by mouth Daily.   11/2/2023    simethicone (MYLICON) 80 MG chewable tablet Chew 1 tablet by mouth 4 (Four) Times a Day As Needed for Flatulence. 100 tablet 0 Past Week    sucralfate (CARAFATE) 1 g tablet Take 1 tablet by mouth Every 12 (Twelve) Hours.   11/2/2023    loperamide (IMODIUM) 2 MG capsule Take 1  "capsule by mouth 4 (Four) Times a Day As Needed for Diarrhea. 120 capsule 0     mupirocin (BACTROBAN) 2 % ointment APPLY TOPICALLY TWICE DAILY TO WOUND          Allergies:  Allergies   Allergen Reactions    Aspirin Unknown - Low Severity    Oxycodone Unknown - Low Severity    Hydroxyurea Other (See Comments)     Her hemoglobin drop and was stop in February 2022 and start taking Jakafi    Diphenhydramine Hcl Anxiety and Unknown (See Comments)     Benadryl IVP       Social History:   Social History     Socioeconomic History    Marital status:    Tobacco Use    Smoking status: Former     Packs/day: 1.00     Years: 20.00     Additional pack years: 0.00     Total pack years: 20.00     Types: Cigarettes     Passive exposure: Past    Smokeless tobacco: Never    Tobacco comments:     30  years ago   Vaping Use    Vaping Use: Never used   Substance and Sexual Activity    Alcohol use: Yes     Comment: \"rare\"    Drug use: Never    Sexual activity: Defer       Family History:  Family History   Problem Relation Age of Onset    Ovarian cancer Mother     Lung cancer Father     Lung cancer Sister     Malig Hyperthermia Neg Hx        Physical Exam:  /67 (BP Location: Left arm, Patient Position: Lying)   Pulse 93   Temp 98.2 °F (36.8 °C) (Oral)   Resp 18   Wt 63 kg (138 lb 14.2 oz)   SpO2 100%   BMI 24.01 kg/m²  Body mass index is 24.01 kg/m². 100% 63 kg (138 lb 14.2 oz)  Physical Exam  Patient is examined using the personal protective equipment as per guidelines from infection control for this particular patient as enacted.  Hand hygiene was performed before and after patient interaction.  Well-developed normal body habitus  Eyes normal conjunctivae and pupils reactive to light  ENT Mallampati between 3 and 4 normal nasal exam  Neck midline trachea no thyromegaly  Chest diminished breath sounds bilaterally with crackles bilaterally in the bases  CVS regular rate and rhythm no lower extremity edema  Abdomen soft " nontender no hepatosplenomegaly  CNS intact normal sensory exam  Skin no rashes no nodules  Psych somewhat confused  Musculoskeletal no cyanosis no clubbing normal range of motion        LABS:  Lab Results   Component Value Date    CALCIUM 8.4 (L) 11/03/2023    PHOS 2.5 09/27/2023     Results from last 7 days   Lab Units 11/03/23  0540 11/02/23  1631   SODIUM mmol/L 135* 132*   POTASSIUM mmol/L 3.9 4.2   CHLORIDE mmol/L 101 98   CO2 mmol/L 20.2* 20.0*   BUN mg/dL 53* 55*   CREATININE mg/dL 2.44* 2.96*   GLUCOSE mg/dL 93 148*   CALCIUM mg/dL 8.4* 8.5*   WBC 10*3/mm3 39.23* 44.77*   HEMOGLOBIN g/dL 7.6* 8.1*   PLATELETS 10*3/mm3 413 522*   ALT (SGPT) U/L  --  17   AST (SGOT) U/L  --  22     Lab Results   Component Value Date    TROPONINI 0.02 (H) 03/16/2022    TROPONINT 47 (H) 09/27/2023                                 Lab Results   Component Value Date    TSH 2.55 04/17/2021     Estimated Creatinine Clearance: 16.8 mL/min (A) (by C-G formula based on SCr of 2.44 mg/dL (H)).         Imaging: I personally visualized the images of scans/x-rays performed within last 3 days.      Assessment:  Acute kidney injury  Chronic diastolic CHF  Left lingular mass  Nausea vomiting  Chronic diastolic CHF  A-fib  Diabetes mellitus  Myeloproliferative disorder  Leukocytosis        Recommendations:  At this time we have complicated female with multiple medical issues multiple admissions this year.  She has had a left lingular consolidation/mass since her previous CT in May this year.  Ex-smoker with multiple comorbidities.  I reviewed CT abdomen pelvis in detail with patient's family at bedside and discussed with them.  They would like to pursue this further.  I would like to get a full CT chest to evaluate further.  After reviewing CT chest she may need a CT-guided biopsy of these left lingular pleural-based mass which the family is agreeable.  Obviously we will have to hold Eliquis and Plavix which is currently on hold and I would  recommend to keep holding for now.  Keep volume status on the dry side  Oxygen to maintain sats above 90%  We will continue to follow        Leilani Wheeler MD  11/3/2023  11:34 EDT      Much of this encounter note is an electronic transcription/translation of spoken language to printed text using Dragon Software.    Electronically signed by Leilani Wheeler MD at 11/03/23 1140       Najma Marquez APRN at 11/03/23 0816        Consult Orders    1. Inpatient Gastroenterology Consult [[id number]] ordered by Jaimee Macias MD at 11/02/23 5978                 Gastroenterology   Initial Inpatient Consult Note    Referring Provider: Dr. Macias    Reason for Consultation: Anemia, nausea, vomiting, and diarrhea    Subjective     History of present illness:      83 y.o. F patient of Dr. Annika Guzman who we are asked to see for anemia, nausea, vomiting, and diarrhea.  she has a significant past medical history of   CAD (stents 2/2022) on Plavix, Afib on Eliquis, chronic diastolic HF, myeloproliferative disorder/anemia following with Dr. Reinoso of Hematology on Jakafi (chronically elevated WBC) who presents with abdominal pain and black stools  who presented to the ER on 11/2/2023 with concerns of worsening malaise and decreased oral intake. No ill-contact or international travel prior to symptom onset.  Completed antibiotic regimen approx 14 days ago for UTI.     Denies upper GI concerns including heartburn, nausea, vomiting, or dysphagia.  Reports that nausea experienced prior to hospital admission was related to severity of lower abdominal cramping that has not recurred since abdominal pain has resolved. Tolerating oral intake without issue.     States that she has not had a bowel movement since hospital admission.  Prior to admission she had been experiencing daily bowel movements without melena or hematochezia with sensation of complete evacuation of stool.    Recent imaging significant for:    Mild nonspecific  nodular thickening of adrenal glands, no bowel wall thickening or obstruction, minimal hiatal hernia, pelvic floor relaxation/rectocele apparent, colonic diverticulosis without evidence of acute diverticulitis.  No acute gastrointestinal process noted on imaging.    Labs Significant for:    WBC 39.23, Hgb 7.6, platelets 413, LFTs normal, BUN 53,     Most Recent endoscopic evaluation:     outpatient capsule recently which showed no source of anemia or bleeding.  She underwent push enteroscopy and C-scope on 2/9/2023, after Plavix was held for 5 days, with findings of 3 cm hiatal hernia, internal hemorrhoids, diverticulosis          Past Medical History:  Past Medical History:   Diagnosis Date    Atherosclerosis of abdominal aorta 02/05/2023    Atrial fibrillation     Breast cancer     CAD (coronary artery disease)     NSTEMI 2/2022: 90% ostial LAD, 99% D1, 70% mid-distal LAD (medical therapy). She received two stents (2.5x18 and 2.5x26mm [place] LEFTY) but I don't know which one went to which lesion.    Carotid atherosclerosis     Cholecystitis 11/22/2022    Added automatically from request for surgery [id number]    Chronic diastolic (congestive) heart failure     COVID 10/29/2022    GERD (gastroesophageal reflux disease)     Glaucoma     History of cataract     Hypertension     Microcytic anemia     per Dr. oleg pate office  note 6/30/22-dd    Myeloproliferative disorder     JAK2 positive    Nonbacterial thrombotic endocarditis     6/2021: 4x5mm vegetation on the ventricular surface of the anterior MV, negative blood cultures    Porcelain gallbladder     PUD (peptic ulcer disease)     TIA (transient ischemic attack)     Type 2 diabetes mellitus     Type 2 diabetes mellitus with circulatory disorder, without long-term current use of insulin 07/14/2022    Upper GI bleed      Past Surgical History:  Past Surgical History:   Procedure Laterality Date    BREAST LUMPECTOMY  1999    CARDIAC CATHETERIZATION N/A 09/02/2022     "Procedure: Coronary angiography;  Surgeon: Guero Verde MD;  Location: [facility] CATH INVASIVE LOCATION;  Service: Cardiovascular;  Laterality: N/A;    CARDIAC CATHETERIZATION N/A 09/02/2022    Procedure: Stent LEFTY coronary;  Surgeon: Guero Verde MD;  Location: [facility] CATH INVASIVE LOCATION;  Service: Cardiovascular;  Laterality: N/A;    CATARACT EXTRACTION  2011    CHOLECYSTECTOMY      CHOLECYSTECTOMY WITH INTRAOPERATIVE CHOLANGIOGRAM N/A 11/28/2022    Procedure: CHOLECYSTECTOMY LAPAROSCOPIC INTRAOPERATIVE CHOLANGIOGRAM;  Surgeon: Dani Grover Jr., MD;  Location: [facility] MAIN OR;  Service: General;  Laterality: N/A;    COLONOSCOPY N/A 02/09/2023    Procedure: COLONOSCOPY TO CECUM & T.I.;  Surgeon: Guero Ferris MD;  Location: [facility] ENDOSCOPY;  Service: Gastroenterology;  Laterality: N/A;  PRE- MELENA, GI BLEED  POST- DIVERTICULOSIS, INT HEMORRHOIDS    ENDOSCOPY N/A 01/25/2023    Procedure: ESOPHAGOGASTRODUODENOSCOPY WITH BIOPSIES;  Surgeon: Charles Diaz MD;  Location: [facility] ENDOSCOPY;  Service: Gastroenterology;  Laterality: N/A;  pre: abd pain, nausea  post: hiatal hernia, mild gastritis, sloughing of the esophagus    ENTEROSCOPY SMALL BOWEL N/A 02/09/2023    Procedure: ENTEROSCOPY SMALL BOWEL;  Surgeon: Guero Ferris MD;  Location: [facility] ENDOSCOPY;  Service: Gastroenterology;  Laterality: N/A;  PRE- MELENA, GI BLEED  POST- HIATAL HERNIA    JOINT REPLACEMENT      UPPER GASTROINTESTINAL ENDOSCOPY        Social History:   Social History     Tobacco Use    Smoking status: Former     Packs/day: 1.00     Years: 20.00     Additional pack years: 0.00     Total pack years: 20.00     Types: Cigarettes     Passive exposure: Past    Smokeless tobacco: Never    Tobacco comments:     30  years ago   Substance Use Topics    Alcohol use: Yes     Comment: \"rare\"      Family History:  Family History   Problem Relation Age of Onset    Ovarian cancer Mother     Lung cancer Father     Lung cancer Sister     " Malig Hyperthermia Neg Hx        Home Meds:  Medications Prior to Admission   Medication Sig Dispense Refill Last Dose    acetaminophen (TYLENOL) 500 MG tablet Take 1 tablet by mouth As Needed.   11/1/2023    apixaban (ELIQUIS) 5 MG tablet tablet Take 1 tablet by mouth 2 (Two) Times a Day. 180 tablet 3 11/2/2023    clopidogrel (PLAVIX) 75 MG tablet TAKE 1 TABLET DAILY 30 tablet 11 11/2/2023    famotidine (PEPCID) 20 MG tablet Take 1 tablet by mouth Daily.   11/2/2023    furosemide (LASIX) 40 MG tablet TAKE 1 TABLET DAILY 90 tablet 3 11/2/2023    hydrocortisone-bacitracin-zinc oxide-nystatin (MAGIC BARRIER) Apply 1 application topically to the appropriate area as directed 2 (Two) Times a Day.   11/2/2023    latanoprost (XALATAN) 0.005 % ophthalmic solution Administer 1 drop to both eyes.   11/2/2023    metoprolol succinate XL (TOPROL-XL) 25 MG 24 hr tablet Take 1 tablet by mouth Daily. 90 tablet 2 11/2/2023    Momelotinib Dihydrochloride (OJJAARA) 200 MG tablet Take 1 tablet by mouth Daily. 28 tablet 3 11/2/2023    ondansetron (Zofran) 4 MG tablet Take 1 tablet by mouth Every 8 (Eight) Hours As Needed for Nausea or Vomiting. 90 tablet 0 Past Week    saccharomyces boulardii (FLORASTOR) 250 MG capsule Take 1 capsule by mouth 2 (Two) Times a Day. 60 capsule 0 11/2/2023    sertraline (ZOLOFT) 100 MG tablet Take 1 tablet by mouth Daily.   11/2/2023    simethicone (MYLICON) 80 MG chewable tablet Chew 1 tablet by mouth 4 (Four) Times a Day As Needed for Flatulence. 100 tablet 0 Past Week    sucralfate (CARAFATE) 1 g tablet Take 1 tablet by mouth Every 12 (Twelve) Hours.   11/2/2023    loperamide (IMODIUM) 2 MG capsule Take 1 capsule by mouth 4 (Four) Times a Day As Needed for Diarrhea. 120 capsule 0     mupirocin (BACTROBAN) 2 % ointment APPLY TOPICALLY TWICE DAILY TO WOUND        Current Meds:   [Held by provider] apixaban, 5 mg, Oral, BID  [Held by provider] clopidogrel, 75 mg, Oral, Daily  famotidine, 20 mg, Oral,  Daily  latanoprost, 1 drop, Both Eyes, Nightly  metoprolol succinate XL, 25 mg, Oral, Daily  Momelotinib Dihydrochloride, 200 mg, Oral, Daily  saccharomyces boulardii, 250 mg, Oral, BID  senna-docusate sodium, 2 tablet, Oral, BID  sertraline, 100 mg, Oral, Daily      Allergies:  Allergies   Allergen Reactions    Aspirin Unknown - Low Severity    Oxycodone Unknown - Low Severity    Hydroxyurea Other (See Comments)     Her hemoglobin drop and was stop in February 2022 and start taking Jakafi    Diphenhydramine Hcl Anxiety and Unknown (See Comments)     Benadryl IVP     Review of Systems  Pertinent items are noted in HPI, all other systems reviewed and negative    Objective     Vital Signs  Temp:  [97.9 °F (36.6 °C)-98.2 °F (36.8 °C)] 98.2 °F (36.8 °C)  Heart Rate:  [] 93  Resp:  [16-20] 18  BP: ()/(46-67) 104/46    Physical Exam:  CONSTITUTIONAL:  today's vital signs reviewed  EARS NOSE THROAT: trachea midline and no deformity of the nares  EYES: no scleral icterus  GASTROINTESTINAL: abdomen is soft, nontender, nondistended with normal active bowel sounds, no masses are appreciated  PSYCHIATRIC: appropriate mood and affect  RESPIRATORY: normal inspiratory effort with no increased work of breathing  NEUROLOGIC: patient is awake and alert  DERMATOLOGIC: skin is warm with no cyanosis  LYMPHATIC: no periumbilical lymphadenopathy     Results Review:              I reviewed the patient's new clinical results.    Results from last 7 days   Lab Units 11/03/23  0540 11/02/23  1631   WBC 10*3/mm3 39.23* 44.77*   HEMOGLOBIN g/dL 7.6* 8.1*   HEMATOCRIT % 24.1* 24.6*   PLATELETS 10*3/mm3 413 522*     Results from last 7 days   Lab Units 11/03/23  0540 11/02/23  1631   SODIUM mmol/L 135* 132*   POTASSIUM mmol/L 3.9 4.2   CHLORIDE mmol/L 101 98   CO2 mmol/L 20.2* 20.0*   BUN mg/dL 53* 55*   CREATININE mg/dL 2.44* 2.96*   CALCIUM mg/dL 8.4* 8.5*   BILIRUBIN mg/dL  --  0.5   ALK PHOS U/L  --  115   ALT (SGPT) U/L  --  17    AST (SGOT) U/L  --  22   GLUCOSE mg/dL 93 148*         Lab Results   Lab Value Date/Time    LIPASE 34 11/02/2023 1631    LIPASE 17 08/13/2023 1023    LIPASE 16 05/03/2023 1534    LIPASE 14 02/17/2023 1130    LIPASE 10 (L) 02/04/2023 1419    LIPASE 13 01/23/2023 0216    LIPASE 20 11/21/2022 0250    LIPASE 38 08/31/2022 2131    LIPASE 16.0 02/09/2022 0525    LIPASE 50 06/29/2021 1131       Radiology:  CT Abdomen Pelvis Without Contrast   Final Result           1. A new indeterminate density in the lingula, as described.   Reticulonodular pulmonary opacities in the bilateral lower lobes.   Follow-up evaluation advised.       2. Colonic diverticulosis. No acute inflammatory process of bowel is   identified. Follow-up as indications persist.       3. No urolithiasis or hydronephrosis. Small arterial calcification of   the left renal hilum.       This report was finalized on 11/2/2023 7:22 PM by Dr. Reilly Ritter M.D on Workstation: [id number]          CT Chest Without Contrast Diagnostic    (Results Pending)       Assessment & Plan   Active Hospital Problems    Diagnosis     **Acute kidney injury        Assessment:  Abdominal pain  Malaise  Acute on chronic anemia  S/p CCY 11/2022  Myeloproliferative disorder with chronic elevation of WBC and anemia on Jakafi (Trell inhibitor)  CAD on Plavix (stents 2/2022)  A-fib on Eliquis    Plan:  Nursing staff to notify GI service on call if any change in clinical status.  If diarrhea recurs we will plan to send stool to assess for infectious etiology  Supportive care per primary team with IVFs and antiemetics.   Continue famotidine 20 mg daily   Continue to monitor H&H and transfuse PRN    Anemia:    2/2023 EGD/Colonoscopy and capsule study without visible GI source to anemia and patient denies evidence of overt GI bleeding. Recommend proceeding with endoscopy evaluation in the event of overt GI bleeding or unexplained worsening anemia while recovering from other acute health  concerns.     Diarrhea:    Discussed possible etiologies to acute diarrhea such as overflow diarrhea secondary to pelvic floor laxity noted on CT evaluation versus gastrointestinal infection.  Recommend patient start outpatient Konsyl fiber supplementation to promote complete emptying with outpatient follow-up with primary gastroenterologist for consideration of pelvic floor evaluation versus anorectal manometry testing.     Thank you for allowing us to participate in the care of this patient.   Please call us with questions or if we can be of further assistance.     I discussed the patients findings and my recommendations with patient, family, and nursing staff.             ABEL Winter  [facility]  [address] [place] [address]          Electronically signed by Namja Marquez APRN at 11/03/23 3032

## 2023-11-03 NOTE — H&P
HISTORY AND PHYSICAL   [facility]        Date of Admission: 2023  Patient Identification:  Name: Catherine Boyer  Age: 83 y.o.  Sex: female  :  1940  MRN: [id number]                     Primary Care Physician: Kristi Moreau APRN    Chief Complaint:  83 year old female who presented to the emergency room with nausea, vomiting and malaise for the last three days; she has had some abdominal pain and cramping and diarrhea; she has not been able to keep anything down; no fever or chills; she is feeling better after receiving iv fluids in the ED    History of Present Illness:   As above    Past Medical History:  Past Medical History:   Diagnosis Date    Atherosclerosis of abdominal aorta 2023    Atrial fibrillation     Breast cancer     CAD (coronary artery disease)     NSTEMI 2022: 90% ostial LAD, 99% D1, 70% mid-distal LAD (medical therapy). She received two stents (2.5x18 and 2.5x26mm [place] LEFTY) but I don't know which one went to which lesion.    Carotid atherosclerosis     Cholecystitis 2022    Added automatically from request for surgery [id number]    Chronic diastolic (congestive) heart failure     COVID 10/29/2022    GERD (gastroesophageal reflux disease)     Glaucoma     History of cataract     Hypertension     Microcytic anemia     per Dr. oleg pate office  note 22-dd    Myeloproliferative disorder     JAK2 positive    Nonbacterial thrombotic endocarditis     2021: 4x5mm vegetation on the ventricular surface of the anterior MV, negative blood cultures    Porcelain gallbladder     PUD (peptic ulcer disease)     TIA (transient ischemic attack)     Type 2 diabetes mellitus     Type 2 diabetes mellitus with circulatory disorder, without long-term current use of insulin 2022    Upper GI bleed      Past Surgical History:  Past Surgical History:   Procedure Laterality Date    BREAST LUMPECTOMY      CARDIAC CATHETERIZATION N/A 2022    Procedure: Coronary  angiography;  Surgeon: Guero Verde MD;  Location: [facility] CATH INVASIVE LOCATION;  Service: Cardiovascular;  Laterality: N/A;    CARDIAC CATHETERIZATION N/A 09/02/2022    Procedure: Stent LEFTY coronary;  Surgeon: Guero Verde MD;  Location: [facility] CATH INVASIVE LOCATION;  Service: Cardiovascular;  Laterality: N/A;    CATARACT EXTRACTION  2011    CHOLECYSTECTOMY      CHOLECYSTECTOMY WITH INTRAOPERATIVE CHOLANGIOGRAM N/A 11/28/2022    Procedure: CHOLECYSTECTOMY LAPAROSCOPIC INTRAOPERATIVE CHOLANGIOGRAM;  Surgeon: Dani Grover Jr., MD;  Location: [facility] MAIN OR;  Service: General;  Laterality: N/A;    COLONOSCOPY N/A 02/09/2023    Procedure: COLONOSCOPY TO CECUM & T.I.;  Surgeon: Guero Ferris MD;  Location: [facility] ENDOSCOPY;  Service: Gastroenterology;  Laterality: N/A;  PRE- MELENA, GI BLEED  POST- DIVERTICULOSIS, INT HEMORRHOIDS    ENDOSCOPY N/A 01/25/2023    Procedure: ESOPHAGOGASTRODUODENOSCOPY WITH BIOPSIES;  Surgeon: Charles Diaz MD;  Location: [facility] ENDOSCOPY;  Service: Gastroenterology;  Laterality: N/A;  pre: abd pain, nausea  post: hiatal hernia, mild gastritis, sloughing of the esophagus    ENTEROSCOPY SMALL BOWEL N/A 02/09/2023    Procedure: ENTEROSCOPY SMALL BOWEL;  Surgeon: Guero Ferris MD;  Location: [facility] ENDOSCOPY;  Service: Gastroenterology;  Laterality: N/A;  PRE- MELENA, GI BLEED  POST- HIATAL HERNIA    JOINT REPLACEMENT      UPPER GASTROINTESTINAL ENDOSCOPY        Home Meds:  Medications Prior to Admission   Medication Sig Dispense Refill Last Dose    acetaminophen (TYLENOL) 500 MG tablet Take 1 tablet by mouth As Needed.   11/1/2023    apixaban (ELIQUIS) 5 MG tablet tablet Take 1 tablet by mouth 2 (Two) Times a Day. 180 tablet 3 11/2/2023    clopidogrel (PLAVIX) 75 MG tablet TAKE 1 TABLET DAILY 30 tablet 11 11/2/2023    famotidine (PEPCID) 20 MG tablet Take 1 tablet by mouth Daily.   11/2/2023    furosemide (LASIX) 40 MG tablet TAKE 1 TABLET DAILY 90 tablet 3 11/2/2023     hydrocortisone-bacitracin-zinc oxide-nystatin (MAGIC BARRIER) Apply 1 application topically to the appropriate area as directed 2 (Two) Times a Day.   11/2/2023    latanoprost (XALATAN) 0.005 % ophthalmic solution Administer 1 drop to both eyes.   11/2/2023    metoprolol succinate XL (TOPROL-XL) 25 MG 24 hr tablet Take 1 tablet by mouth Daily. 90 tablet 2 11/2/2023    Momelotinib Dihydrochloride (OJJAARA) 200 MG tablet Take 1 tablet by mouth Daily. 28 tablet 3 11/2/2023    ondansetron (Zofran) 4 MG tablet Take 1 tablet by mouth Every 8 (Eight) Hours As Needed for Nausea or Vomiting. 90 tablet 0 Past Week    saccharomyces boulardii (FLORASTOR) 250 MG capsule Take 1 capsule by mouth 2 (Two) Times a Day. 60 capsule 0 11/2/2023    sertraline (ZOLOFT) 100 MG tablet Take 1 tablet by mouth Daily.   11/2/2023    simethicone (MYLICON) 80 MG chewable tablet Chew 1 tablet by mouth 4 (Four) Times a Day As Needed for Flatulence. 100 tablet 0 Past Week    sucralfate (CARAFATE) 1 g tablet Take 1 tablet by mouth Every 12 (Twelve) Hours.   11/2/2023    loperamide (IMODIUM) 2 MG capsule Take 1 capsule by mouth 4 (Four) Times a Day As Needed for Diarrhea. 120 capsule 0     mupirocin (BACTROBAN) 2 % ointment APPLY TOPICALLY TWICE DAILY TO WOUND          Allergies:  Allergies   Allergen Reactions    Aspirin Unknown - Low Severity    Oxycodone Unknown - Low Severity    Hydroxyurea Other (See Comments)     Her hemoglobin drop and was stop in February 2022 and start taking Jakafi    Diphenhydramine Hcl Anxiety and Unknown (See Comments)     Benadryl IVP     Immunizations:  Immunization History   Administered Date(s) Administered    Pneumococcal Conjugate 13-Valent (PCV13) 07/08/2021     Social History:   Social History     Social History Narrative    Not on file     Social History     Socioeconomic History    Marital status:    Tobacco Use    Smoking status: Former     Packs/day: 1.00     Years: 20.00     Additional pack years: 0.00  "    Total pack years: 20.00     Types: Cigarettes     Passive exposure: Past    Smokeless tobacco: Never    Tobacco comments:     30  years ago   Vaping Use    Vaping Use: Never used   Substance and Sexual Activity    Alcohol use: Yes     Comment: \"rare\"    Drug use: Never    Sexual activity: Defer       Family History:  Family History   Problem Relation Age of Onset    Ovarian cancer Mother     Lung cancer Father     Lung cancer Sister     [place] Hyperthermia Neg Hx         Review of Systems  See history of present illness and past medical history.  Patient denies headache, dizziness, syncope, falls, trauma, change in vision, change in hearing,  focal weakness, numbness, or paresthesia.  Patient denies chest pain, palpitations, dyspnea, orthopnea, PND, cough, sinus pressure, rhinorrhea, epistaxis, hemoptysis ,hematemesis, diarrhea, constipation or hematochezia.  Denies cold or heat intolerance, polydipsia, polyuria, polyphagia. Denies hematuria, pyuria, dysuria, hesitancy, frequency or urgency. Denies consumption of raw and under cooked meats foods or change in water source.  Denies fever, chills, sweats, night sweats.        Objective:  T Max 24 hrs: Temp (24hrs), Av.9 °F (36.6 °C), Min:97.9 °F (36.6 °C), Max:97.9 °F (36.6 °C)    Vitals Ranges:   Temp:  [97.9 °F (36.6 °C)] 97.9 °F (36.6 °C)  Heart Rate:  [100] 100  Resp:  [16] 16  BP: (145)/(58) 145/58      Exam:  /58   Pulse 100   Temp 97.9 °F (36.6 °C)   Resp 16   SpO2 96%     General Appearance:    Alert, cooperative, no distress, appears stated age   Head:    Normocephalic, without obvious abnormality, atraumatic   Eyes:    PERRL, conjunctivae/corneas clear, EOM's intact, both eyes   Ears:    Normal external ear canals, both ears   Nose:   Nares normal, septum midline, mucosa normal, no drainage    or sinus tenderness   Throat:   Lips, mucosa, and tongue normal   Neck:   Supple, symmetrical, trachea midline, no adenopathy;     thyroid:  no " enlargement/tenderness/nodules; no carotid    bruit or JVD   Back:     Symmetric, no curvature, ROM normal, no CVA tenderness   Lungs:     Decreased breath sounds bilaterally, respirations unlabored   Chest Wall:    No tenderness or deformity    Heart:    Regular rate and rhythm, S1 and S2 normal, no murmur, rub   or gallop   Abdomen:     Soft, nontender, bowel sounds active all four quadrants,     no masses, no hepatomegaly, no splenomegaly   Extremities:   Extremities normal, atraumatic, no cyanosis or edema                       .    Data Review:  Labs in chart were reviewed.  WBC   Date Value Ref Range Status   11/02/2023 44.77 (C) 3.40 - 10.80 10*3/mm3 Final     Hemoglobin   Date Value Ref Range Status   11/02/2023 8.1 (L) 12.0 - 15.9 g/dL Final     Hematocrit   Date Value Ref Range Status   11/02/2023 24.6 (L) 34.0 - 46.6 % Final     Platelets   Date Value Ref Range Status   11/02/2023 522 (H) 140 - 450 10*3/mm3 Final     Sodium   Date Value Ref Range Status   11/02/2023 132 (L) 136 - 145 mmol/L Final     Potassium   Date Value Ref Range Status   11/02/2023 4.2 3.5 - 5.2 mmol/L Final     Comment:     Slight hemolysis detected by analyzer. Results may be affected.     Chloride   Date Value Ref Range Status   11/02/2023 98 98 - 107 mmol/L Final     CO2   Date Value Ref Range Status   11/02/2023 20.0 (L) 22.0 - 29.0 mmol/L Final     BUN   Date Value Ref Range Status   11/02/2023 55 (H) 8 - 23 mg/dL Final     Creatinine   Date Value Ref Range Status   11/02/2023 2.96 (H) 0.57 - 1.00 mg/dL Final     Glucose   Date Value Ref Range Status   11/02/2023 148 (H) 65 - 99 mg/dL Final     Calcium   Date Value Ref Range Status   11/02/2023 8.5 (L) 8.6 - 10.5 mg/dL Final                Imaging Results (All)       Procedure Component Value Units Date/Time    CT Abdomen Pelvis Without Contrast [[id number]] Collected: 11/02/23 1912     Updated: 11/02/23 1925    Narrative:      CT ABDOMEN PELVIS WO CONTRAST-     INDICATIONS:  Abdominal pain     TECHNIQUE: Radiation dose reduction techniques were utilized, including  automated exposure control and exposure modulation based on body size.  Unenhanced ABDOMEN AND PELVIS CT     COMPARISON: 8/13/2023     FINDINGS:     The gallbladder is surgically absent.     Mild nonspecific nodular thickening of the adrenal glands is seen.     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 A small arterial calcification is  apparent at the left renal hilum. No hydronephrosis.     Otherwise unremarkable unenhanced appearance of the liver, spleen,  adrenal glands, pancreas, kidneys, bladder.     No bowel obstruction or abnormal bowel thickening is identified. Colonic  diverticula are seen that do not appear inflamed. Minimal hiatal hernia  is seen. Pelvic floor relaxation/rectocele is apparent, correlate with  clinical exam.     No free intraperitoneal gas or free fluid. Umbilical hernia of fat is  noted.     Scattered small mesenteric and para-aortic lymph nodes are seen that are  not significant by size criteria.     Abdominal aorta is not aneurysmal. Aortic and other arterial  calcifications are present.     The lung bases show a new pleural-based density in the lingula, 1.3 x  3.3 cm on axial image 8, that could be rounded atelectasis or  potentially a focus of infection or even neoplasm, correlate clinically,  PET/CT correlation can be obtained as indicated, in addition to  continued CT follow-up. Reticulonodular opacities in the lower lobes may  be inflammatory/infectious in etiology, follow-up recommended.     Degenerative changes are seen in the spine. Stable sclerotic foci in the  left aspect of the sacrum, right femur, nonspecific, may represent bone  island. No acute fracture is identified.             Impression:            1. A new indeterminate density in the lingula, as described.  Reticulonodular pulmonary opacities in the bilateral lower lobes.  Follow-up evaluation advised.     2. Colonic  diverticulosis. No acute inflammatory process of bowel is  identified. Follow-up as indications persist.     3. No urolithiasis or hydronephrosis. Small arterial calcification of  the left renal hilum.     This report was finalized on 11/2/2023 7:22 PM by Dr. Reilly Ritter M.D on Workstation: [employer]                 Assessment:  Active Hospital Problems    Diagnosis  POA    **Acute kidney injury [N17.9]  Yes      Resolved Hospital Problems   No resolved problems to display.   Nausea and vomiting  Cad  Chronic diastolic chf  A fib  Hypertension  Diabetes  Hyponatremia  Anemia  Myeloproliferative disorder  hyperglycemia    Plan:  Continue fluids  Ask nephrology to see her  Ask pulmonary to see her regarding her abnormal ct scan  Monitor on telemetry  Heme occult stool  Ask gi to see her  Hold eliquis and plavix for now  Dw patient and ed provider    Jaimee Macias MD  11/2/2023  23:37 EDT

## 2023-11-03 NOTE — H&P (VIEW-ONLY)
CONSULT NOTE    Patient Identification:  Catherine Boyer  83 y.o.  female  1940  [id number]            Requesting physician: Hospitalist    Reason for Consultation: Left lingular mass    CC:     History of Present Illness:  83-year-old female with multiple medical issues including myelofibrosis diastolic heart failure admitted with nausea vomiting malaise for the last 3 days.  She had a CT abdomen done and lower cuts showed left lingular mass versus consolidation.  Patient not the best of historian but denies any purulent sputum or significant cough at this time.  She is requiring nasal cannula oxygen.  She does have history of smoking quit 20+ years ago but smoked for 20+ years in the past.  No hemoptysis or weight loss reported.  She also has underlying history of myelofibrosis.    Review of Systems  As above rest is negative  Past Medical History:  Past Medical History:   Diagnosis Date    Atherosclerosis of abdominal aorta 02/05/2023    Atrial fibrillation     Breast cancer     CAD (coronary artery disease)     NSTEMI 2/2022: 90% ostial LAD, 99% D1, 70% mid-distal LAD (medical therapy). She received two stents (2.5x18 and 2.5x26mm [place] LEFTY) but I don't know which one went to which lesion.    Carotid atherosclerosis     Cholecystitis 11/22/2022    Added automatically from request for surgery [id number]    Chronic diastolic (congestive) heart failure     COVID 10/29/2022    GERD (gastroesophageal reflux disease)     Glaucoma     History of cataract     Hypertension     Microcytic anemia     per Dr. oleg pate office  note 6/30/22-dd    Myeloproliferative disorder     JAK2 positive    Nonbacterial thrombotic endocarditis     6/2021: 4x5mm vegetation on the ventricular surface of the anterior MV, negative blood cultures    Porcelain gallbladder     PUD (peptic ulcer disease)     TIA (transient ischemic attack)     Type 2 diabetes mellitus     Type 2 diabetes mellitus with circulatory disorder, without long-term  current use of insulin 07/14/2022    Upper GI bleed        Past Surgical History:  Past Surgical History:   Procedure Laterality Date    BREAST LUMPECTOMY  1999    CARDIAC CATHETERIZATION N/A 09/02/2022    Procedure: Coronary angiography;  Surgeon: Guero Verde MD;  Location: [facility] CATH INVASIVE LOCATION;  Service: Cardiovascular;  Laterality: N/A;    CARDIAC CATHETERIZATION N/A 09/02/2022    Procedure: Stent LEFTY coronary;  Surgeon: Guero Verde MD;  Location: [facility] CATH INVASIVE LOCATION;  Service: Cardiovascular;  Laterality: N/A;    CATARACT EXTRACTION  2011    CHOLECYSTECTOMY      CHOLECYSTECTOMY WITH INTRAOPERATIVE CHOLANGIOGRAM N/A 11/28/2022    Procedure: CHOLECYSTECTOMY LAPAROSCOPIC INTRAOPERATIVE CHOLANGIOGRAM;  Surgeon: Dani Grover Jr., MD;  Location: [facility] MAIN OR;  Service: General;  Laterality: N/A;    COLONOSCOPY N/A 02/09/2023    Procedure: COLONOSCOPY TO CECUM & T.I.;  Surgeon: Guero Ferris MD;  Location: [facility] ENDOSCOPY;  Service: Gastroenterology;  Laterality: N/A;  PRE- MELENA, GI BLEED  POST- DIVERTICULOSIS, INT HEMORRHOIDS    ENDOSCOPY N/A 01/25/2023    Procedure: ESOPHAGOGASTRODUODENOSCOPY WITH BIOPSIES;  Surgeon: Charles Diaz MD;  Location: [facility] ENDOSCOPY;  Service: Gastroenterology;  Laterality: N/A;  pre: abd pain, nausea  post: hiatal hernia, mild gastritis, sloughing of the esophagus    ENTEROSCOPY SMALL BOWEL N/A 02/09/2023    Procedure: ENTEROSCOPY SMALL BOWEL;  Surgeon: Guero Ferris MD;  Location: [facility] ENDOSCOPY;  Service: Gastroenterology;  Laterality: N/A;  PRE- MELENA, GI BLEED  POST- HIATAL HERNIA    JOINT REPLACEMENT      UPPER GASTROINTESTINAL ENDOSCOPY          Home Meds:  Medications Prior to Admission   Medication Sig Dispense Refill Last Dose    acetaminophen (TYLENOL) 500 MG tablet Take 1 tablet by mouth As Needed.   11/1/2023    apixaban (ELIQUIS) 5 MG tablet tablet Take 1 tablet by mouth 2 (Two) Times a Day. 180 tablet 3 11/2/2023     clopidogrel (PLAVIX) 75 MG tablet TAKE 1 TABLET DAILY 30 tablet 11 11/2/2023    famotidine (PEPCID) 20 MG tablet Take 1 tablet by mouth Daily.   11/2/2023    furosemide (LASIX) 40 MG tablet TAKE 1 TABLET DAILY 90 tablet 3 11/2/2023    hydrocortisone-bacitracin-zinc oxide-nystatin (MAGIC BARRIER) Apply 1 application topically to the appropriate area as directed 2 (Two) Times a Day.   11/2/2023    latanoprost (XALATAN) 0.005 % ophthalmic solution Administer 1 drop to both eyes.   11/2/2023    metoprolol succinate XL (TOPROL-XL) 25 MG 24 hr tablet Take 1 tablet by mouth Daily. 90 tablet 2 11/2/2023    Momelotinib Dihydrochloride (OJJAARA) 200 MG tablet Take 1 tablet by mouth Daily. 28 tablet 3 11/2/2023    ondansetron (Zofran) 4 MG tablet Take 1 tablet by mouth Every 8 (Eight) Hours As Needed for Nausea or Vomiting. 90 tablet 0 Past Week    saccharomyces boulardii (FLORASTOR) 250 MG capsule Take 1 capsule by mouth 2 (Two) Times a Day. 60 capsule 0 11/2/2023    sertraline (ZOLOFT) 100 MG tablet Take 1 tablet by mouth Daily.   11/2/2023    simethicone (MYLICON) 80 MG chewable tablet Chew 1 tablet by mouth 4 (Four) Times a Day As Needed for Flatulence. 100 tablet 0 Past Week    sucralfate (CARAFATE) 1 g tablet Take 1 tablet by mouth Every 12 (Twelve) Hours.   11/2/2023    loperamide (IMODIUM) 2 MG capsule Take 1 capsule by mouth 4 (Four) Times a Day As Needed for Diarrhea. 120 capsule 0     mupirocin (BACTROBAN) 2 % ointment APPLY TOPICALLY TWICE DAILY TO WOUND          Allergies:  Allergies   Allergen Reactions    Aspirin Unknown - Low Severity    Oxycodone Unknown - Low Severity    Hydroxyurea Other (See Comments)     Her hemoglobin drop and was stop in February 2022 and start taking Jakafi    Diphenhydramine Hcl Anxiety and Unknown (See Comments)     Benadryl IVP       Social History:   Social History     Socioeconomic History    Marital status:    Tobacco Use    Smoking status: Former     Packs/day: 1.00      "Years: 20.00     Additional pack years: 0.00     Total pack years: 20.00     Types: Cigarettes     Passive exposure: Past    Smokeless tobacco: Never    Tobacco comments:     30  years ago   Vaping Use    Vaping Use: Never used   Substance and Sexual Activity    Alcohol use: Yes     Comment: \"rare\"    Drug use: Never    Sexual activity: Defer       Family History:  Family History   Problem Relation Age of Onset    Ovarian cancer Mother     Lung cancer Father     Lung cancer Sister     [place] Hyperthermia Neg Hx        Physical Exam:  /67 (BP Location: Left arm, Patient Position: Lying)   Pulse 93   Temp 98.2 °F (36.8 °C) (Oral)   Resp 18   Wt 63 kg (138 lb 14.2 oz)   SpO2 100%   BMI 24.01 kg/m²  Body mass index is 24.01 kg/m². 100% 63 kg (138 lb 14.2 oz)  Physical Exam  Patient is examined using the personal protective equipment as per guidelines from infection control for this particular patient as enacted.  Hand hygiene was performed before and after patient interaction.  Well-developed normal body habitus  Eyes normal conjunctivae and pupils reactive to light  ENT Mallampati between 3 and 4 normal nasal exam  Neck midline trachea no thyromegaly  Chest diminished breath sounds bilaterally with crackles bilaterally in the bases  CVS regular rate and rhythm no lower extremity edema  Abdomen soft nontender no hepatosplenomegaly  CNS intact normal sensory exam  Skin no rashes no nodules  Psych somewhat confused  Musculoskeletal no cyanosis no clubbing normal range of motion        LABS:  Lab Results   Component Value Date    CALCIUM 8.4 (L) 11/03/2023    PHOS 2.5 09/27/2023     Results from last 7 days   Lab Units 11/03/23  0540 11/02/23  1631   SODIUM mmol/L 135* 132*   POTASSIUM mmol/L 3.9 4.2   CHLORIDE mmol/L 101 98   CO2 mmol/L 20.2* 20.0*   BUN mg/dL 53* 55*   CREATININE mg/dL 2.44* 2.96*   GLUCOSE mg/dL 93 148*   CALCIUM mg/dL 8.4* 8.5*   WBC 10*3/mm3 39.23* 44.77*   HEMOGLOBIN g/dL 7.6* 8.1* "   PLATELETS 10*3/mm3 413 522*   ALT (SGPT) U/L  --  17   AST (SGOT) U/L  --  22     Lab Results   Component Value Date    TROPONINI 0.02 (H) 03/16/2022    TROPONINT 47 (H) 09/27/2023                                 Lab Results   Component Value Date    TSH 2.55 04/17/2021     Estimated Creatinine Clearance: 16.8 mL/min (A) (by C-G formula based on SCr of 2.44 mg/dL (H)).         Imaging: I personally visualized the images of scans/x-rays performed within last 3 days.      Assessment:  Acute kidney injury  Chronic diastolic CHF  Left lingular mass  Nausea vomiting  Chronic diastolic CHF  A-fib  Diabetes mellitus  Myeloproliferative disorder  Leukocytosis        Recommendations:  At this time we have complicated female with multiple medical issues multiple admissions this year.  She has had a left lingular consolidation/mass since her previous CT in May this year.  Ex-smoker with multiple comorbidities.  I reviewed CT abdomen pelvis in detail with patient's family at bedside and discussed with them.  They would like to pursue this further.  I would like to get a full CT chest to evaluate further.  After reviewing CT chest she may need a CT-guided biopsy of these left lingular pleural-based mass which the family is agreeable.  Obviously we will have to hold Eliquis and Plavix which is currently on hold and I would recommend to keep holding for now.  Keep volume status on the dry side  Oxygen to maintain sats above 90%  We will continue to follow        Leilani Wheeler MD  11/3/2023  11:34 EDT      Much of this encounter note is an electronic transcription/translation of spoken language to printed text using Dragon Software.

## 2023-11-03 NOTE — THERAPY EVALUATION
Patient Name: Catherine Boyer  : 1940    MRN: [id number]                              Today's Date: 11/3/2023       Admit Date: 2023    Visit Dx:     ICD-10-CM ICD-9-CM   1. Acute renal failure, unspecified acute renal failure type  N17.9 584.9   2. Acute abdominal pain  R10.9 789.00     338.19   3. Nausea vomiting and diarrhea  R11.2 787.91    R19.7 787.01     Patient Active Problem List   Diagnosis    Acute on chronic diastolic heart failure    CAD (coronary artery disease)    Nonbacterial thrombotic endocarditis    Paroxysmal atrial fibrillation    Myeloproliferative disorder    Microcytic anemia    Type 2 diabetes mellitus with circulatory disorder, without long-term current use of insulin    GERD (gastroesophageal reflux disease)    Hypertension    Personal history of transient ischemic attack (TIA), 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 deficits    Porcelain gallbladder    Breast cancer    Atherosclerosis of abdominal aorta    APC (atrial premature contractions)    Moderate malnutrition    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    Elevated troponin    TIA (transient ischemic attack)    PUD (peptic ulcer disease)    Anemia    Tachycardia    Myelofibrosis    Normocytic anemia    Acute kidney injury     Past Medical History:   Diagnosis Date    Atherosclerosis of abdominal aorta 2023    Atrial fibrillation     Breast cancer     CAD (coronary artery disease)     NSTEMI 2022: 90% ostial LAD, 99% D1, 70% mid-distal LAD (medical therapy). She received two stents (2.5x18 and 2.5x26mm [place] LEFTY) but I don't know which one went to which lesion.    Carotid atherosclerosis     Cholecystitis 2022    Added automatically from request for surgery [id number]    Chronic diastolic (congestive) heart failure     COVID 10/29/2022    GERD (gastroesophageal reflux disease)     Glaucoma     History of cataract     Hypertension     Microcytic anemia     per Dr. oleg pate office  note 22-dd    Myeloproliferative  disorder     JAK2 positive    Nonbacterial thrombotic endocarditis     6/2021: 4x5mm vegetation on the ventricular surface of the anterior MV, negative blood cultures    Porcelain gallbladder     PUD (peptic ulcer disease)     TIA (transient ischemic attack)     Type 2 diabetes mellitus     Type 2 diabetes mellitus with circulatory disorder, without long-term current use of insulin 07/14/2022    Upper GI bleed      Past Surgical History:   Procedure Laterality Date    BREAST LUMPECTOMY  1999    CARDIAC CATHETERIZATION N/A 09/02/2022    Procedure: Coronary angiography;  Surgeon: Guero Verde MD;  Location: [facility] CATH INVASIVE LOCATION;  Service: Cardiovascular;  Laterality: N/A;    CARDIAC CATHETERIZATION N/A 09/02/2022    Procedure: Stent LEFTY coronary;  Surgeon: Guero Verde MD;  Location: [facility] CATH INVASIVE LOCATION;  Service: Cardiovascular;  Laterality: N/A;    CATARACT EXTRACTION  2011    CHOLECYSTECTOMY      CHOLECYSTECTOMY WITH INTRAOPERATIVE CHOLANGIOGRAM N/A 11/28/2022    Procedure: CHOLECYSTECTOMY LAPAROSCOPIC INTRAOPERATIVE CHOLANGIOGRAM;  Surgeon: Dani Grover Jr., MD;  Location: [facility] MAIN OR;  Service: General;  Laterality: N/A;    COLONOSCOPY N/A 02/09/2023    Procedure: COLONOSCOPY TO CECUM & T.I.;  Surgeon: Guero Ferris MD;  Location: [facility] ENDOSCOPY;  Service: Gastroenterology;  Laterality: N/A;  PRE- MELENA, GI BLEED  POST- DIVERTICULOSIS, INT HEMORRHOIDS    ENDOSCOPY N/A 01/25/2023    Procedure: ESOPHAGOGASTRODUODENOSCOPY WITH BIOPSIES;  Surgeon: Charles Diaz MD;  Location: [facility] ENDOSCOPY;  Service: Gastroenterology;  Laterality: N/A;  pre: abd pain, nausea  post: hiatal hernia, mild gastritis, sloughing of the esophagus    ENTEROSCOPY SMALL BOWEL N/A 02/09/2023    Procedure: ENTEROSCOPY SMALL BOWEL;  Surgeon: Guero Ferris MD;  Location: [facility] ENDOSCOPY;  Service: Gastroenterology;  Laterality: N/A;  PRE- MELENA, GI BLEED  POST- HIATAL HERNIA    JOINT REPLACEMENT       UPPER GASTROINTESTINAL ENDOSCOPY        General Information       Row Name 11/03/23 1134          Physical Therapy Time and Intention    Document Type evaluation  -CH (r) SK (t) CH (c)     Mode of Treatment individual therapy;physical therapy  -CH (r) SK (t) CH (c)       Row Name 11/03/23 1134          General Information    Patient Profile Reviewed yes  -CH (r) SK (t) CH (c)     Prior Level of Function independent:  -CH (r) SK (t) CH (c)     Existing Precautions/Restrictions fall  -CH (r) SK (t) CH (c)     Barriers to Rehab medically complex  -CH (r) SK (t) CH (c)       Row Name 11/03/23 1134          Living Environment    People in Home alone;facility resident  [facility]  -CH (r) SK (t) CH (c)       Row Name 11/03/23 1134          Cognition    Orientation Status (Cognition) oriented x 4  -CH (r) SK (t) CH (c)       Row Name 11/03/23 1134          Safety Issues, Functional Mobility    Impairments Affecting Function (Mobility) balance;endurance/activity tolerance;strength;pain  -CH (r) SK (t) CH (c)     Comment, Safety Issues/Impairments (Mobility) non skid socks, gait belt  -CH (r) SK (t) CH (c)               User Key  (r) = Recorded By, (t) = Taken By, (c) = Cosigned By      Initials Name Provider Type    CH Zeinab Joya, PT Physical Therapist    SK Nessa Barajas, PT Student PT Student                   Mobility       Row Name 11/03/23 1135          Bed Mobility    Bed Mobility supine-sit;sit-supine  -CH (r) SK (t) CH (c)     Supine-Sit [place] (Bed Mobility) contact guard;verbal cues  -CH (r) SK (t) CH (c)     Sit-Supine [place] (Bed Mobility) contact guard;verbal cues  -CH (r) SK (t) CH (c)     Assistive Device (Bed Mobility) bed rails;head of bed elevated  -CH (r) SK (t) CH (c)       Row Name 11/03/23 1135          Sit-Stand Transfer    Sit-Stand [place] (Transfers) minimum assist (75% patient effort);verbal cues;nonverbal cues (demo/gesture)  -CH (r) SK (t) CH (c)      Assistive Device (Sit-Stand Transfers) walker, front-wheeled  -CH (r) SK (t) CH (c)       Row Name 11/03/23 1135          Gait/Stairs (Locomotion)    [place] Level (Gait) contact guard;verbal cues;nonverbal cues (demo/gesture)  -CH (r) SK (t) CH (c)     Assistive Device (Gait) walker, front-wheeled  -CH (r) SK (t) CH (c)     Distance in Feet (Gait) 20  -CH (r) SK (t) CH (c)     Deviations/Abnormal Patterns (Gait) jeanna decreased;gait speed decreased;stride length decreased  -CH (r) SK (t) CH (c)     Bilateral Gait Deviations forward flexed posture;heel strike decreased  -CH (r) SK (t) CH (c)               User Key  (r) = Recorded By, (t) = Taken By, (c) = Cosigned By      Initials Name Provider Type    Zeinab Joseph, PT Physical Therapist    Nessa Francois, PT Student PT Student                   Obj/Interventions       Row Name 11/03/23 1136          Range of Motion Comprehensive    General Range of Motion bilateral lower extremity ROM WFL  -CH (r) SK (t) CH (c)       Row Name 11/03/23 1136          Strength Comprehensive (MMT)    Comment, General Manual Muscle Testing (MMT) Assessment B LEs grossly >/=3+/5  -CH (r) SK (t) CH (c)       Row Name 11/03/23 1136          Balance    Balance Assessment standing static balance;standing dynamic balance  -CH (r) SK (t) CH (c)     Static Standing Balance standby assist;verbal cues  -CH (r) SK (t) CH (c)     Dynamic Standing Balance verbal cues;contact guard  -CH (r) SK (t) CH (c)     Position/Device Used, Standing Balance walker, front-wheeled  -CH (r) SK (t) CH (c)               User Key  (r) = Recorded By, (t) = Taken By, (c) = Cosigned By      Initials Name Provider Type    Zeinab Joseph, PT Physical Therapist    Nessa Francois, PT Student PT Student                   Goals/Plan       Row Name 11/03/23 1141          Bed Mobility Goal 1 (PT)    Activity/Assistive Device (Bed Mobility Goal 1, PT) bed mobility activities, all  -CH (r)  SK (t) CH (c)     [place] Level/Cues Needed (Bed Mobility Goal 1, PT) independent  -CH (r) SK (t) CH (c)     Time Frame (Bed Mobility Goal 1, PT) 1 week  -CH (r) SK (t) CH (c)       Row Name 11/03/23 1141          Transfer Goal 1 (PT)    Activity/Assistive Device (Transfer Goal 1, PT) transfers, all  -CH (r) SK (t) CH (c)     [place] Level/Cues Needed (Transfer Goal 1, PT) independent  -CH (r) SK (t) CH (c)     Time Frame (Transfer Goal 1, PT) 1 week  -CH (r) SK (t) CH (c)       Row Name 11/03/23 1141          Gait Training Goal 1 (PT)    Activity/Assistive Device (Gait Training Goal 1, PT) gait (walking locomotion);assistive device use;walker, rolling  -CH (r) SK (t) CH (c)     [place] Level (Gait Training Goal 1, PT) independent  -CH (r) SK (t) CH (c)     Distance (Gait Training Goal 1, PT) 150  -CH (r) SK (t) CH (c)     Time Frame (Gait Training Goal 1, PT) 1 week  -CH (r) SK (t) CH (c)       Row Name 11/03/23 1141          Therapy Assessment/Plan (PT)    Planned Therapy Interventions (PT) balance training;bed mobility training;gait training;home exercise program;patient/family education;transfer training;strengthening  -CH (r) SK (t) CH (c)               User Key  (r) = Recorded By, (t) = Taken By, (c) = Cosigned By      Initials Name Provider Type    Zeinab Joseph, PT Physical Therapist    Nessa Francois, PT Student PT Student                   Clinical Impression       Row Name 11/03/23 1137          Pain    Pre/Posttreatment Pain Comment pt reports soreness between legs and on bottom  -CH (r) SK (t) CH (c)     Pain Intervention(s) Repositioned;Ambulation/increased activity;Rest  -CH (r) SK (t) CH (c)       Row Name 11/03/23 1137          Plan of Care Review    Plan of Care Reviewed With patient;daughter;sibling  -CH (r) SK (t) CH (c)     Outcome Evaluation Pt is an 84 yo F who was admitted for acute abdominal pain, nausea, and vomiting. Pt presents in bed with daughter and  brother at bedside. Pt states she is independent at baseline using a rwx. Pt performed bed mobility with CGA, STS with min asst, and ambulated 20' with FWW and CGA. Pt demonstrated decrease activity tolerance and generalized weakness. PT will continue to follow to address functional deficits and progress activity as pt tolerates. PT recommends return to [facility] with HHPT upon d/c.  -CH (r) SK (t) CH (c)       Row Name 11/03/23 0822          Therapy Assessment/Plan (PT)    Patient/Family Therapy Goals Statement (PT) return to PLOF  -CH (r) SK (t) CH (c)     Rehab Potential (PT) good, to achieve stated therapy goals  -CH (r) SK (t) CH (c)     Criteria for Skilled Interventions Met (PT) skilled treatment is necessary  -CH (r) SK (t) CH (c)     Therapy Frequency (PT) 5 times/wk  -CH (r) SK (t) CH (c)       Row Name 11/03/23 1133          Positioning and Restraints    Pre-Treatment Position in bed  -CH (r) SK (t) CH (c)     Post Treatment Position bed  -CH (r) SK (t) CH (c)     In Bed fowlers;call light within reach;encouraged to call for assist;exit alarm on;with family/caregiver;notified nsg  -CH (r) SK (t) CH (c)               User Key  (r) = Recorded By, (t) = Taken By, (c) = Cosigned By      Initials Name Provider Type     Zeinab Joya, PT Physical Therapist    Nessa Francois, PT Student PT Student                   Outcome Measures       Row Name 11/03/23 114          How much help from another person do you currently need...    Turning from your back to your side while in flat bed without using bedrails? 3  -CH (r) SK (t) CH (c)     Moving from lying on back to sitting on the side of a flat bed without bedrails? 3  -CH (r) SK (t) CH (c)     Moving to and from a bed to a chair (including a wheelchair)? 3  -CH (r) SK (t) CH (c)     Standing up from a chair using your arms (e.g., wheelchair, bedside chair)? 3  -CH (r) SK (t) CH (c)     Climbing 3-5 steps with a railing? 2  -CH (r) SK (t) CH (c)      To walk in hospital room? 3  -CH (r) SK (t) CH (c)     AM-PAC 6 Clicks Score (PT) 17  -CH (r) SK (t)     Highest level of mobility 5 --> Static standing  -CH (r) SK (t)       Row Name 11/03/23 1141          Functional Assessment    Outcome Measure Options AM-PAC 6 Clicks Basic Mobility (PT)  -CH (r) SK (t) CH (c)               User Key  (r) = Recorded By, (t) = Taken By, (c) = Cosigned By      Initials Name Provider Type     Zeinab Joya, PT Physical Therapist    Nessa Francois, PT Student PT Student                                 Physical Therapy Education       Title: PT OT SLP Therapies (In Progress)       Topic: Physical Therapy (In Progress)       Point: Mobility training (Done)       Learning Progress Summary             Patient Acceptance, E, VU by SK at 11/3/2023 1142   Family Acceptance, E, VU by SK at 11/3/2023 1142                         Point: Home exercise program (Not Started)       Learner Progress:  Not documented in this visit.              Point: Body mechanics (Done)       Learning Progress Summary             Patient Acceptance, E, VU by SK at 11/3/2023 1142   Family Acceptance, E, VU by SK at 11/3/2023 1142                         Point: Precautions (Done)       Learning Progress Summary             Patient Acceptance, E, VU by SK at 11/3/2023 1142   Family Acceptance, E, VU by SK at 11/3/2023 1142                                         User Key       Initials Effective Dates Name Provider Type [facility] 10/10/23 -  Nessa Barajas, PT Student PT Student PT                  PT Recommendation and Plan  Planned Therapy Interventions (PT): balance training, bed mobility training, gait training, home exercise program, patient/family education, transfer training, strengthening  Plan of Care Reviewed With: patient, daughter, sibling  Outcome Evaluation: Pt is an 82 yo F who was admitted for acute abdominal pain, nausea, and vomiting. Pt presents in bed with daughter  and brother at bedside. Pt states she is independent at baseline using a rwx. Pt performed bed mobility with CGA, STS with min asst, and ambulated 20' with FWW and CGA. Pt demonstrated decrease activity tolerance and generalized weakness. PT will continue to follow to address functional deficits and progress activity as pt tolerates. PT recommends return to [facility] with HHPT upon d/c.     Time Calculation:         PT Charges       Row Name 11/03/23 1142             Time Calculation    Start Time 1038  -CH (r) SK (t) CH (c)      Stop Time 1050  -CH (r) SK (t) CH (c)      Time Calculation (min) 12 min  -CH (r) SK (t)      PT Received On 11/03/23  -CH (r) SK (t) CH (c)      PT - Next Appointment 11/06/23  -CH (r) SK (t) CH (c)      PT Goal Re-Cert Due Date 11/10/23  -CH (r) SK (t) CH (c)         Time Calculation- PT    Total Timed Code Minutes- PT 8 minute(s)  -CH (r) SK (t) CH (c)         Timed Charges    30919 - PT Therapeutic Activity Minutes 8  -CH (r) SK (t) CH (c)         Total Minutes    Timed Charges Total Minutes 8  -CH (r) SK (t)       Total Minutes 8  -CH (r) SK (t)                User Key  (r) = Recorded By, (t) = Taken By, (c) = Cosigned By      Initials Name Provider Type    CH Zeinab Joya, PT Physical Therapist    SK Nessa Barajas, PT Student PT Student                  Therapy Charges for Today       Code Description Service Date Service Provider Modifiers Qty    [id number]  PT THERAPEUTIC ACT EA 15 MIN 11/3/2023 Nessa Barajas, PT Student GP 1    [id number]  PT EVAL MOD COMPLEXITY 2 11/3/2023 Nessa Barajas, PT Student GP 1            PT G-Codes  Outcome Measure Options: AM-PAC 6 Clicks Basic Mobility (PT)  AM-PAC 6 Clicks Score (PT): 17  PT Discharge Summary  Anticipated Discharge Disposition (PT): assisted living, home with home health    Nessa Barajas PT Student  11/3/2023

## 2023-11-03 NOTE — PLAN OF CARE
Goal Outcome Evaluation:  Plan of Care Reviewed With: (P) patient, daughter, sibling           Outcome Evaluation: (P) Pt is an 82 yo F who was admitted for acute abdominal pain, nausea, and vomiting. Pt presents in bed with daughter and brother at bedside. Pt states she is independent at baseline using a rwx. Pt performed bed mobility with CGA, STS with min asst, and ambulated 20' with FWW and CGA. Pt demonstrated decrease activity tolerance and generalized weakness. PT will continue to follow to address functional deficits and progress activity as pt tolerates. PT recommends return to [facility] with HHPT upon d/c.      Anticipated Discharge Disposition (PT): (P) assisted living, home with home health

## 2023-11-03 NOTE — PROGRESS NOTES
Name: Catherine Boyer ADMIT: 2023   : 1940  PCP: Kristi Moreau APRN    MRN: [id number] LOS: 1 days   AGE/SEX: 83 y.o. female  ROOM: [address]     This is an 83-year-old woman who comes to the hospital for abdominal pain.  CT of the abdomen pelvis obtained yesterday showed colonic diverticulosis without any inflammatory issues as well as a new indeterminant density in the lingula.  And reticulonodular pulmonary opacities in the bilateral lower lobes.    Objective   Objective   Vital Signs  Temp:  [97.9 °F (36.6 °C)-98.2 °F (36.8 °C)] 98.2 °F (36.8 °C)  Heart Rate:  [] 93  Resp:  [16-20] 18  BP: ()/(46-67) 104/46  SpO2:  [96 %-100 %] 100 %  on  Flow (L/min):  [2] 2;   Device (Oxygen Therapy): nasal cannula  Body mass index is 24.01 kg/m².  Physical Exam  Constitutional:       General: She is not in acute distress.  HENT:      Head: Normocephalic and atraumatic.      Mouth/Throat:      Mouth: Mucous membranes are dry.   Cardiovascular:      Rate and Rhythm: Normal rate and regular rhythm.   Pulmonary:      Effort: Pulmonary effort is normal. No respiratory distress.   Abdominal:      General: There is no distension.      Palpations: Abdomen is soft.      Tenderness: There is no abdominal tenderness.   Skin:     General: Skin is warm and dry.   Neurological:      General: No focal deficit present.      Mental Status: She is alert and oriented to person, place, and time.         Results Review     I reviewed the patient's new clinical results.  Results from last 7 days   Lab Units 23  0540 23  1631   WBC 10*3/mm3 39.23* 44.77*   HEMOGLOBIN g/dL 7.6* 8.1*   PLATELETS 10*3/mm3 413 522*     Results from last 7 days   Lab Units 23  0540 23  1631   SODIUM mmol/L 135* 132*   POTASSIUM mmol/L 3.9 4.2   CHLORIDE mmol/L 101 98   CO2 mmol/L 20.2* 20.0*   BUN mg/dL 53* 55*   CREATININE mg/dL 2.44* 2.96*   GLUCOSE mg/dL 93 148*   Estimated Creatinine Clearance: 16.8  "mL/min (A) (by C-G formula based on SCr of 2.44 mg/dL (H)).  Results from last 7 days   Lab Units 11/02/23  1631   ALBUMIN g/dL 3.7   BILIRUBIN mg/dL 0.5   ALK PHOS U/L 115   AST (SGOT) U/L 22   ALT (SGPT) U/L 17     Results from last 7 days   Lab Units 11/03/23  0540 11/02/23  1631   CALCIUM mg/dL 8.4* 8.5*   ALBUMIN g/dL  --  3.7       COVID19   Date Value Ref Range Status   08/13/2023 Not Detected Not Detected - Ref. Range Final   10/29/2022 Detected (C) Not Detected - Ref. Range Final     No results found for: \"HGBA1C\", \"POCGLU\"  No results found for this or any previous visit.      CT Abdomen Pelvis Without Contrast  Narrative: CT ABDOMEN PELVIS WO CONTRAST-     INDICATIONS: Abdominal pain     TECHNIQUE: Radiation dose reduction techniques were utilized, including  automated exposure control and exposure modulation based on body size.  Unenhanced ABDOMEN AND PELVIS CT     COMPARISON: 8/13/2023     FINDINGS:     The gallbladder is surgically absent.     Mild nonspecific nodular thickening of the adrenal glands is seen.     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 A small arterial calcification is  apparent at the left renal hilum. No hydronephrosis.     Otherwise unremarkable unenhanced appearance of the liver, spleen,  adrenal glands, pancreas, kidneys, bladder.     No bowel obstruction or abnormal bowel thickening is identified. Colonic  diverticula are seen that do not appear inflamed. Minimal hiatal hernia  is seen. Pelvic floor relaxation/rectocele is apparent, correlate with  clinical exam.     No free intraperitoneal gas or free fluid. Umbilical hernia of fat is  noted.     Scattered small mesenteric and para-aortic lymph nodes are seen that are  not significant by size criteria.     Abdominal aorta is not aneurysmal. Aortic and other arterial  calcifications are present.     The lung bases show a new pleural-based density in the lingula, 1.3 x  3.3 cm on axial image 8, that could be rounded atelectasis " or  potentially a focus of infection or even neoplasm, correlate clinically,  PET/CT correlation can be obtained as indicated, in addition to  continued CT follow-up. Reticulonodular opacities in the lower lobes may  be inflammatory/infectious in etiology, follow-up recommended.     Degenerative changes are seen in the spine. Stable sclerotic foci in the  left aspect of the sacrum, right femur, nonspecific, may represent bone  island. No acute fracture is identified.           Impression:       1. A new indeterminate density in the lingula, as described.  Reticulonodular pulmonary opacities in the bilateral lower lobes.  Follow-up evaluation advised.     2. Colonic diverticulosis. No acute inflammatory process of bowel is  identified. Follow-up as indications persist.     3. No urolithiasis or hydronephrosis. Small arterial calcification of  the left renal hilum.     This report was finalized on 11/2/2023 7:22 PM by Dr. Reilly Ritter M.D on Workstation: [id number]       Scheduled Medications  [Held by provider] apixaban, 5 mg, Oral, BID  [Held by provider] clopidogrel, 75 mg, Oral, Daily  famotidine, 20 mg, Oral, Daily  latanoprost, 1 drop, Both Eyes, Nightly  metoprolol succinate XL, 25 mg, Oral, Daily  Momelotinib Dihydrochloride, 200 mg, Oral, Daily  saccharomyces boulardii, 250 mg, Oral, BID  senna-docusate sodium, 2 tablet, Oral, BID  sertraline, 100 mg, Oral, Daily    Infusions  sodium chloride, 75 mL/hr, Last Rate: 75 mL/hr (11/03/23 0002)    Diet  Diet: Cardiac Diets; Healthy Heart (2-3 Na+); Texture: Regular Texture (IDDSI 7); Fluid Consistency: Thin (IDDSI 0)       Assessment/Plan     Active Hospital Problems    Diagnosis  POA    **Acute kidney injury [N17.9]  Yes    Myelofibrosis [D75.81]  Yes    Personal history of transient ischemic attack (TIA), 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 deficits [Z86.73]  Not Applicable    Hypertension [I10]  Yes    Type 2 diabetes mellitus with circulatory disorder,  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       83 y.o. female admitted with Acute kidney injury.    Acute renal failure  Most likely secondary to dehydration.  Hold diuretics.  Nephrology following.    Abnormal CT scan  Concern for an indeterminant mass from the lingula.  CT chest ordered with potential need for biopsy    Congestive heart failure  Continue metoprolol.  Hold Lasix    Coronary artery disease  Paroxysmal atrial fibrillation  Hypertension  Eliquis and Plavix have been held.  Continue metoprolol.  Hold Lasix.    Myeloproliferative disorder  Leukocytosis    SCDs for DVT prophylaxis.  Full code.  Discussed with patient, family, and nursing staff.  Anticipate discharge home with  vs SNU facility in 2-3 days.      Junior Doan MD  [facility]  11/03/23  13:12 EDT

## 2023-11-03 NOTE — CONSULTS
CONSULT NOTE    Patient Identification:  Catherine Boyer  83 y.o.  female  1940  [id number]            Requesting physician: Hospitalist    Reason for Consultation: Left lingular mass    CC:     History of Present Illness:  83-year-old female with multiple medical issues including myelofibrosis diastolic heart failure admitted with nausea vomiting malaise for the last 3 days.  She had a CT abdomen done and lower cuts showed left lingular mass versus consolidation.  Patient not the best of historian but denies any purulent sputum or significant cough at this time.  She is requiring nasal cannula oxygen.  She does have history of smoking quit 20+ years ago but smoked for 20+ years in the past.  No hemoptysis or weight loss reported.  She also has underlying history of myelofibrosis.    Review of Systems  As above rest is negative  Past Medical History:  Past Medical History:   Diagnosis Date    Atherosclerosis of abdominal aorta 02/05/2023    Atrial fibrillation     Breast cancer     CAD (coronary artery disease)     NSTEMI 2/2022: 90% ostial LAD, 99% D1, 70% mid-distal LAD (medical therapy). She received two stents (2.5x18 and 2.5x26mm [place] LEFTY) but I don't know which one went to which lesion.    Carotid atherosclerosis     Cholecystitis 11/22/2022    Added automatically from request for surgery [id number]    Chronic diastolic (congestive) heart failure     COVID 10/29/2022    GERD (gastroesophageal reflux disease)     Glaucoma     History of cataract     Hypertension     Microcytic anemia     per Dr. oleg pate office  note 6/30/22-dd    Myeloproliferative disorder     JAK2 positive    Nonbacterial thrombotic endocarditis     6/2021: 4x5mm vegetation on the ventricular surface of the anterior MV, negative blood cultures    Porcelain gallbladder     PUD (peptic ulcer disease)     TIA (transient ischemic attack)     Type 2 diabetes mellitus     Type 2 diabetes mellitus with circulatory disorder, without long-term  current use of insulin 07/14/2022    Upper GI bleed        Past Surgical History:  Past Surgical History:   Procedure Laterality Date    BREAST LUMPECTOMY  1999    CARDIAC CATHETERIZATION N/A 09/02/2022    Procedure: Coronary angiography;  Surgeon: Guero Verde MD;  Location: [facility] CATH INVASIVE LOCATION;  Service: Cardiovascular;  Laterality: N/A;    CARDIAC CATHETERIZATION N/A 09/02/2022    Procedure: Stent LEFTY coronary;  Surgeon: Guero Verde MD;  Location: [facility] CATH INVASIVE LOCATION;  Service: Cardiovascular;  Laterality: N/A;    CATARACT EXTRACTION  2011    CHOLECYSTECTOMY      CHOLECYSTECTOMY WITH INTRAOPERATIVE CHOLANGIOGRAM N/A 11/28/2022    Procedure: CHOLECYSTECTOMY LAPAROSCOPIC INTRAOPERATIVE CHOLANGIOGRAM;  Surgeon: Dani Grover Jr., MD;  Location: [facility] MAIN OR;  Service: General;  Laterality: N/A;    COLONOSCOPY N/A 02/09/2023    Procedure: COLONOSCOPY TO CECUM & T.I.;  Surgeon: Guero Ferris MD;  Location: [facility] ENDOSCOPY;  Service: Gastroenterology;  Laterality: N/A;  PRE- MELENA, GI BLEED  POST- DIVERTICULOSIS, INT HEMORRHOIDS    ENDOSCOPY N/A 01/25/2023    Procedure: ESOPHAGOGASTRODUODENOSCOPY WITH BIOPSIES;  Surgeon: Charles Diaz MD;  Location: [facility] ENDOSCOPY;  Service: Gastroenterology;  Laterality: N/A;  pre: abd pain, nausea  post: hiatal hernia, mild gastritis, sloughing of the esophagus    ENTEROSCOPY SMALL BOWEL N/A 02/09/2023    Procedure: ENTEROSCOPY SMALL BOWEL;  Surgeon: Guero Ferris MD;  Location: [facility] ENDOSCOPY;  Service: Gastroenterology;  Laterality: N/A;  PRE- MELENA, GI BLEED  POST- HIATAL HERNIA    JOINT REPLACEMENT      UPPER GASTROINTESTINAL ENDOSCOPY          Home Meds:  Medications Prior to Admission   Medication Sig Dispense Refill Last Dose    acetaminophen (TYLENOL) 500 MG tablet Take 1 tablet by mouth As Needed.   11/1/2023    apixaban (ELIQUIS) 5 MG tablet tablet Take 1 tablet by mouth 2 (Two) Times a Day. 180 tablet 3 11/2/2023     clopidogrel (PLAVIX) 75 MG tablet TAKE 1 TABLET DAILY 30 tablet 11 11/2/2023    famotidine (PEPCID) 20 MG tablet Take 1 tablet by mouth Daily.   11/2/2023    furosemide (LASIX) 40 MG tablet TAKE 1 TABLET DAILY 90 tablet 3 11/2/2023    hydrocortisone-bacitracin-zinc oxide-nystatin (MAGIC BARRIER) Apply 1 application topically to the appropriate area as directed 2 (Two) Times a Day.   11/2/2023    latanoprost (XALATAN) 0.005 % ophthalmic solution Administer 1 drop to both eyes.   11/2/2023    metoprolol succinate XL (TOPROL-XL) 25 MG 24 hr tablet Take 1 tablet by mouth Daily. 90 tablet 2 11/2/2023    Momelotinib Dihydrochloride (OJJAARA) 200 MG tablet Take 1 tablet by mouth Daily. 28 tablet 3 11/2/2023    ondansetron (Zofran) 4 MG tablet Take 1 tablet by mouth Every 8 (Eight) Hours As Needed for Nausea or Vomiting. 90 tablet 0 Past Week    saccharomyces boulardii (FLORASTOR) 250 MG capsule Take 1 capsule by mouth 2 (Two) Times a Day. 60 capsule 0 11/2/2023    sertraline (ZOLOFT) 100 MG tablet Take 1 tablet by mouth Daily.   11/2/2023    simethicone (MYLICON) 80 MG chewable tablet Chew 1 tablet by mouth 4 (Four) Times a Day As Needed for Flatulence. 100 tablet 0 Past Week    sucralfate (CARAFATE) 1 g tablet Take 1 tablet by mouth Every 12 (Twelve) Hours.   11/2/2023    loperamide (IMODIUM) 2 MG capsule Take 1 capsule by mouth 4 (Four) Times a Day As Needed for Diarrhea. 120 capsule 0     mupirocin (BACTROBAN) 2 % ointment APPLY TOPICALLY TWICE DAILY TO WOUND          Allergies:  Allergies   Allergen Reactions    Aspirin Unknown - Low Severity    Oxycodone Unknown - Low Severity    Hydroxyurea Other (See Comments)     Her hemoglobin drop and was stop in February 2022 and start taking Jakafi    Diphenhydramine Hcl Anxiety and Unknown (See Comments)     Benadryl IVP       Social History:   Social History     Socioeconomic History    Marital status:    Tobacco Use    Smoking status: Former     Packs/day: 1.00      "Years: 20.00     Additional pack years: 0.00     Total pack years: 20.00     Types: Cigarettes     Passive exposure: Past    Smokeless tobacco: Never    Tobacco comments:     30  years ago   Vaping Use    Vaping Use: Never used   Substance and Sexual Activity    Alcohol use: Yes     Comment: \"rare\"    Drug use: Never    Sexual activity: Defer       Family History:  Family History   Problem Relation Age of Onset    Ovarian cancer Mother     Lung cancer Father     Lung cancer Sister     Malig Hyperthermia Neg Hx        Physical Exam:  /67 (BP Location: Left arm, Patient Position: Lying)   Pulse 93   Temp 98.2 °F (36.8 °C) (Oral)   Resp 18   Wt 63 kg (138 lb 14.2 oz)   SpO2 100%   BMI 24.01 kg/m²  Body mass index is 24.01 kg/m². 100% 63 kg (138 lb 14.2 oz)  Physical Exam  Patient is examined using the personal protective equipment as per guidelines from infection control for this particular patient as enacted.  Hand hygiene was performed before and after patient interaction.  Well-developed normal body habitus  Eyes normal conjunctivae and pupils reactive to light  ENT Mallampati between 3 and 4 normal nasal exam  Neck midline trachea no thyromegaly  Chest diminished breath sounds bilaterally with crackles bilaterally in the bases  CVS regular rate and rhythm no lower extremity edema  Abdomen soft nontender no hepatosplenomegaly  CNS intact normal sensory exam  Skin no rashes no nodules  Psych somewhat confused  Musculoskeletal no cyanosis no clubbing normal range of motion        LABS:  Lab Results   Component Value Date    CALCIUM 8.4 (L) 11/03/2023    PHOS 2.5 09/27/2023     Results from last 7 days   Lab Units 11/03/23  0540 11/02/23  1631   SODIUM mmol/L 135* 132*   POTASSIUM mmol/L 3.9 4.2   CHLORIDE mmol/L 101 98   CO2 mmol/L 20.2* 20.0*   BUN mg/dL 53* 55*   CREATININE mg/dL 2.44* 2.96*   GLUCOSE mg/dL 93 148*   CALCIUM mg/dL 8.4* 8.5*   WBC 10*3/mm3 39.23* 44.77*   HEMOGLOBIN g/dL 7.6* 8.1* "   PLATELETS 10*3/mm3 413 522*   ALT (SGPT) U/L  --  17   AST (SGOT) U/L  --  22     Lab Results   Component Value Date    TROPONINI 0.02 (H) 03/16/2022    TROPONINT 47 (H) 09/27/2023                                 Lab Results   Component Value Date    TSH 2.55 04/17/2021     Estimated Creatinine Clearance: 16.8 mL/min (A) (by C-G formula based on SCr of 2.44 mg/dL (H)).         Imaging: I personally visualized the images of scans/x-rays performed within last 3 days.      Assessment:  Acute kidney injury  Chronic diastolic CHF  Left lingular mass  Nausea vomiting  Chronic diastolic CHF  A-fib  Diabetes mellitus  Myeloproliferative disorder  Leukocytosis        Recommendations:  At this time we have complicated female with multiple medical issues multiple admissions this year.  She has had a left lingular consolidation/mass since her previous CT in May this year.  Ex-smoker with multiple comorbidities.  I reviewed CT abdomen pelvis in detail with patient's family at bedside and discussed with them.  They would like to pursue this further.  I would like to get a full CT chest to evaluate further.  After reviewing CT chest she may need a CT-guided biopsy of these left lingular pleural-based mass which the family is agreeable.  Obviously we will have to hold Eliquis and Plavix which is currently on hold and I would recommend to keep holding for now.  Keep volume status on the dry side  Oxygen to maintain sats above 90%  We will continue to follow        Leilani Wheeler MD  11/3/2023  11:34 EDT      Much of this encounter note is an electronic transcription/translation of spoken language to printed text using Dragon Software.

## 2023-11-04 ENCOUNTER — APPOINTMENT (OUTPATIENT)
Dept: CT IMAGING | Facility: HOSPITAL | Age: 83
End: 2023-11-04
Payer: MEDICARE

## 2023-11-04 LAB
ADV 40+41 DNA STL QL NAA+NON-PROBE: NOT DETECTED
ALBUMIN SERPL-MCNC: 3.5 G/DL (ref 3.5–5.2)
ANION GAP SERPL CALCULATED.3IONS-SCNC: 14 MMOL/L (ref 5–15)
ASTRO TYP 1-8 RNA STL QL NAA+NON-PROBE: NOT DETECTED
BUN SERPL-MCNC: 42 MG/DL (ref 8–23)
BUN/CREAT SERPL: 23.5 (ref 7–25)
C CAYETANENSIS DNA STL QL NAA+NON-PROBE: NOT DETECTED
C COLI+JEJ+UPSA DNA STL QL NAA+NON-PROBE: NOT DETECTED
CALCIUM SPEC-SCNC: 8.2 MG/DL (ref 8.6–10.5)
CHLORIDE SERPL-SCNC: 106 MMOL/L (ref 98–107)
CO2 SERPL-SCNC: 19 MMOL/L (ref 22–29)
CREAT SERPL-MCNC: 1.79 MG/DL (ref 0.57–1)
CRYPTOSP DNA STL QL NAA+NON-PROBE: NOT DETECTED
D-LACTATE SERPL-SCNC: 3.1 MMOL/L (ref 0.5–2)
D-LACTATE SERPL-SCNC: 3.2 MMOL/L (ref 0.5–2)
D-LACTATE SERPL-SCNC: 3.5 MMOL/L (ref 0.5–2)
E HISTOLYT DNA STL QL NAA+NON-PROBE: NOT DETECTED
EAEC PAA PLAS AGGR+AATA ST NAA+NON-PRB: NOT DETECTED
EC STX1+STX2 GENES STL QL NAA+NON-PROBE: NOT DETECTED
EGFRCR SERPLBLD CKD-EPI 2021: 27.5 ML/MIN/1.73
EPEC EAE GENE STL QL NAA+NON-PROBE: NOT DETECTED
ETEC LTA+ST1A+ST1B TOX ST NAA+NON-PROBE: NOT DETECTED
G LAMBLIA DNA STL QL NAA+NON-PROBE: NOT DETECTED
GLUCOSE SERPL-MCNC: 112 MG/DL (ref 65–99)
NOROVIRUS GI+II RNA STL QL NAA+NON-PROBE: NOT DETECTED
P SHIGELLOIDES DNA STL QL NAA+NON-PROBE: NOT DETECTED
PHOSPHATE SERPL-MCNC: 3.3 MG/DL (ref 2.5–4.5)
POTASSIUM SERPL-SCNC: 3.7 MMOL/L (ref 3.5–5.2)
PROCALCITONIN SERPL-MCNC: 0.09 NG/ML (ref 0–0.25)
RVA RNA STL QL NAA+NON-PROBE: NOT DETECTED
S ENT+BONG DNA STL QL NAA+NON-PROBE: NOT DETECTED
SAPO I+II+IV+V RNA STL QL NAA+NON-PROBE: NOT DETECTED
SHIGELLA SP+EIEC IPAH ST NAA+NON-PROBE: NOT DETECTED
SODIUM SERPL-SCNC: 139 MMOL/L (ref 136–145)
V CHOL+PARA+VUL DNA STL QL NAA+NON-PROBE: NOT DETECTED
V CHOLERAE DNA STL QL NAA+NON-PROBE: NOT DETECTED
Y ENTEROCOL DNA STL QL NAA+NON-PROBE: NOT DETECTED

## 2023-11-04 PROCEDURE — 25810000003 SODIUM CHLORIDE 0.9 % SOLUTION: Performed by: INTERNAL MEDICINE

## 2023-11-04 PROCEDURE — 83605 ASSAY OF LACTIC ACID: CPT | Performed by: STUDENT IN AN ORGANIZED HEALTH CARE EDUCATION/TRAINING PROGRAM

## 2023-11-04 PROCEDURE — 71250 CT THORAX DX C-: CPT

## 2023-11-04 PROCEDURE — 84145 PROCALCITONIN (PCT): CPT | Performed by: STUDENT IN AN ORGANIZED HEALTH CARE EDUCATION/TRAINING PROGRAM

## 2023-11-04 PROCEDURE — 80069 RENAL FUNCTION PANEL: CPT | Performed by: INTERNAL MEDICINE

## 2023-11-04 PROCEDURE — 99232 SBSQ HOSP IP/OBS MODERATE 35: CPT | Performed by: INTERNAL MEDICINE

## 2023-11-04 PROCEDURE — 87040 BLOOD CULTURE FOR BACTERIA: CPT | Performed by: STUDENT IN AN ORGANIZED HEALTH CARE EDUCATION/TRAINING PROGRAM

## 2023-11-04 PROCEDURE — 25010000002 CEFTRIAXONE PER 250 MG: Performed by: STUDENT IN AN ORGANIZED HEALTH CARE EDUCATION/TRAINING PROGRAM

## 2023-11-04 RX ORDER — HYDROCODONE BITARTRATE AND ACETAMINOPHEN 5; 325 MG/1; MG/1
1 TABLET ORAL EVERY 6 HOURS PRN
Status: DISPENSED | OUTPATIENT
Start: 2023-11-04 | End: 2023-11-17

## 2023-11-04 RX ADMIN — CEFTRIAXONE 2000 MG: 2 INJECTION, POWDER, FOR SOLUTION INTRAMUSCULAR; INTRAVENOUS at 21:08

## 2023-11-04 RX ADMIN — Medication 250 MG: at 09:21

## 2023-11-04 RX ADMIN — Medication 250 MG: at 21:08

## 2023-11-04 RX ADMIN — LATANOPROST 1 DROP: 50 SOLUTION/ DROPS OPHTHALMIC at 21:08

## 2023-11-04 RX ADMIN — SERTRALINE 100 MG: 100 TABLET, FILM COATED ORAL at 09:21

## 2023-11-04 RX ADMIN — SODIUM CHLORIDE 75 ML/HR: 9 INJECTION, SOLUTION INTRAVENOUS at 05:43

## 2023-11-04 RX ADMIN — METOPROLOL SUCCINATE 25 MG: 25 TABLET, EXTENDED RELEASE ORAL at 09:21

## 2023-11-04 RX ADMIN — FAMOTIDINE 20 MG: 20 TABLET ORAL at 09:21

## 2023-11-04 RX ADMIN — HYDROCODONE BITARTRATE AND ACETAMINOPHEN 1 TABLET: 5; 325 TABLET ORAL at 19:32

## 2023-11-04 NOTE — PROGRESS NOTES
[facility]     Progress Note    Patient Name: Catherine Boyer  : 1940  MRN: [id number]  Primary Care Physician:  Kristi Moreau APRN  Date of admission: 2023    Subjective   Subjective     Chief Complaint: KARLI    History of Present Illness  Patient with KARLI due to volume deplerion    Review of Systems    Objective   Objective     Vitals:   Temp:  [97.3 °F (36.3 °C)-98.6 °F (37 °C)] 97.3 °F (36.3 °C)  Heart Rate:  [] 107  Resp:  [18] 18  BP: ()/(46-80) 110/58  Flow (L/min):  [2] 2    Physical Exam   General Appearance:  In no acute distress.    HEENT: Normal HEENT exam.     Extremities: .  There is no deformity, effusion or dependent edema.    Neurological: Patient is alert     Result Review    Result Review:  I have personally reviewed the results from the time of this admission to 2023 13:01 EDT and agree with these findings:  []  Laboratory list / accordion  []  Microbiology  []  Radiology  []  EKG/Telemetry   []  Cardiology/Vascular   []  Pathology  []  Old records  []  Other:  Most notable findings include:       Assessment & Plan   Assessment / Plan     Brief Patient Summary:  Catherine Boyer is a 83 y.o. female who has karli due to volume depletion    Active Hospital Problems:  Active Hospital Problems    Diagnosis     **Acute kidney injury     Myelofibrosis     Personal history of transient ischemic attack (TIA), 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 deficits     Hypertension     Type 2 diabetes mellitus with circulatory disorder, without long-term current use of insulin     Paroxysmal atrial fibrillation     CAD (coronary artery disease)     Myeloproliferative disorder      Plan:   Patient seen and examined. Labs and chart reviewed. Renal function improved with cr of 1.7.  will decrease ivf to 75 cc/hr and f/u with labs in am        Nimisha Weiss MD

## 2023-11-04 NOTE — PROGRESS NOTES
Dr. DELL Alford    [address]        Patient ID:  Name:  Catherine Boyer  MRN:  [id number]  1940  83 y.o.  female            CC/Reason for visit: Left lung opacity, bilateral pulmonary infiltrates, myelodysplastic syndrome    Interval hx: Patient has been off Plavix and Eliquis since Thursday.  She has a dry cough.  No hemoptysis.  No fever.  I reviewed notes by Dr. Wheeler and other medical consultants.  Patient on 2 L of oxygen    ROS: Denies palpitations or abdominal pain    I reviewed old medical records.  Past medical history, social history and family history: Unchanged from admission H&P.      Vitals:  Vitals:    11/03/23 2337 11/04/23 0539 11/04/23 0811 11/04/23 1244   BP: 111/46  110/58 90/46   BP Location: Left arm  Left arm Left arm   Patient Position: Lying  Lying Lying   Pulse: 93  107 82   Resp: 18  18 18   Temp: 98.6 °F (37 °C)  97.3 °F (36.3 °C)    TempSrc: Oral  Oral    SpO2: 99%  98% 95%   Weight:  62.6 kg (138 lb)             Body mass index is 23.85 kg/m².  No intake or output data in the 24 hours ending 11/04/23 1358    Exam:  GEN:  No distress  Alert, oriented x 3.  Moves all 4 extremities without focal deficits.  She is very hard of hearing  LUNGS: Clear breath sounds bilat, no use of accessory muscles  CV:  Normal S1S2, without murmur, no edema  ABD:  Non tender, no enlarged liver or masses      Scheduled meds:  [Held by provider] apixaban, 2.5 mg, Oral, BID  cefTRIAXone, 1,000 mg, Intravenous, Q24H  [Held by provider] clopidogrel, 75 mg, Oral, Daily  famotidine, 20 mg, Oral, Daily  latanoprost, 1 drop, Both Eyes, Nightly  metoprolol succinate XL, 25 mg, Oral, Daily  Momelotinib Dihydrochloride, 200 mg, Oral, Daily  saccharomyces boulardii, 250 mg, Oral, BID  senna-docusate sodium, 2 tablet, Oral, BID  sertraline, 100 mg, Oral, Daily      IV meds:                      sodium chloride, 75 mL/hr, Last Rate: 75 mL/hr (11/04/23 0543)        Data Review:   I reviewed the  patient's medications and new clinical results.            Results from last 7 days   Lab Units 11/04/23  0626 11/03/23  0540 11/02/23  1631   SODIUM mmol/L 139 135* 132*   POTASSIUM mmol/L 3.7 3.9 4.2   CHLORIDE mmol/L 106 101 98   CO2 mmol/L 19.0* 20.2* 20.0*   BUN mg/dL 42* 53* 55*   CREATININE mg/dL 1.79* 2.44* 2.96*   CALCIUM mg/dL 8.2* 8.4* 8.5*   BILIRUBIN mg/dL  --   --  0.5   ALK PHOS U/L  --   --  115   ALT (SGPT) U/L  --   --  17   AST (SGOT) U/L  --   --  22   GLUCOSE mg/dL 112* 93 148*   WBC 10*3/mm3  --  39.23* 44.77*   HEMOGLOBIN g/dL  --  7.6* 8.1*   PLATELETS 10*3/mm3  --  413 522*     Results from last 7 days   Lab Units 11/03/23  1603   URINECX  >100,000 CFU/mL Escherichia coli*           ASSESSMENT:   Bilateral pulmonary infiltrates  Lingula pleural-based opacity  Acute kidney injury    CAD (coronary artery disease)    Paroxysmal atrial fibrillation    Myeloproliferative disorder    Type 2 diabetes mellitus with circulatory disorder, without long-term current use of insulin    Hypertension    Personal history of transient ischemic attack (TIA), 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 deficits    Myelofibrosis        PLAN:  Patient and all problems new to me during this admission.  I discussed with the patient and family at the bedside.  She is hard of hearing but she expressed understanding.  She has bilateral pulmonary infiltrates as well as rounded pulmonary opacity in the lingula.  The differential diagnosis is broad given her immunosuppressed state from myeloproliferative disorder.  The recommendation is bronchoscopy with lung biopsies.  If this does not give us the answer, she might need CT-guided percutaneous transthoracic lung biopsy.  The benefit of first performing bronchoscopy with biopsy is the ability to obtain bronchoalveolar lavage from the other lobes with infiltrates.  She is immunocompromised and we need to rule out opportunistic infection.  Both she and family have agreed to  proceed on Monday.  Eliquis and Plavix have been stopped since Thursday.  Remain off of these for now.        Raoul Alford MD  11/4/2023

## 2023-11-04 NOTE — PROGRESS NOTES
[facility]  Inpatient Progress Note    Reason for Follow Up: Anemia    Subjective     Interval History:   No overt bleeding    Current Facility-Administered Medications:     acetaminophen (TYLENOL) tablet 650 mg, 650 mg, Oral, Q4H PRN, Jaimee Macias MD    [Held by provider] apixaban (ELIQUIS) tablet 2.5 mg, 2.5 mg, Oral, BID, Jaimee Macias MD    sennosides-docusate (PERICOLACE) 8.6-50 MG per tablet 2 tablet, 2 tablet, Oral, BID **AND** polyethylene glycol (MIRALAX) packet 17 g, 17 g, Oral, Daily PRN **AND** bisacodyl (DULCOLAX) EC tablet 5 mg, 5 mg, Oral, Daily PRN **AND** bisacodyl (DULCOLAX) suppository 10 mg, 10 mg, Rectal, Daily PRN, Jaimee Macias MD    cefTRIAXone (ROCEPHIN) 1,000 mg in sodium chloride 0.9 % 100 mL IVPB-VTB, 1,000 mg, Intravenous, Q24H, Junior Doan MD    [Held by provider] clopidogrel (PLAVIX) tablet 75 mg, 75 mg, Oral, Daily, Jaimee Mcaias MD    famotidine (PEPCID) tablet 20 mg, 20 mg, Oral, Daily, Jaimee Macias MD, 20 mg at 11/04/23 0921    latanoprost (XALATAN) 0.005 % ophthalmic solution 1 drop, 1 drop, Both Eyes, Nightly, Jaimee Macias MD, 1 drop at 11/03/23 2150    melatonin tablet 3 mg, 3 mg, Oral, Nightly PRN, Jaimee Macias MD    metoprolol succinate XL (TOPROL-XL) 24 hr tablet 25 mg, 25 mg, Oral, Daily, Jaimee Macias MD, 25 mg at 11/04/23 0921    Momelotinib Dihydrochloride (OJJAARA) 200 mg, 200 mg, Oral, Daily, Jaimee Macias MD, 200 mg at 11/04/23 0922    ondansetron (ZOFRAN) tablet 4 mg, 4 mg, Oral, Q6H PRN **OR** ondansetron (ZOFRAN) injection 4 mg, 4 mg, Intravenous, Q6H PRN, Jaimee Macias MD    saccharomyces boulardii (FLORASTOR) capsule 250 mg, 250 mg, Oral, BID, Jaimee Macias MD, 250 mg at 11/04/23 0921    sertraline (ZOLOFT) tablet 100 mg, 100 mg, Oral, Daily, Jaimee Macias MD, 100 mg at 11/04/23 0921    Insert Peripheral IV, , , Once **AND**  sodium chloride 0.9 % flush 10 mL, 10 mL, Intravenous, PRN, Tommy Clemente MD    sodium chloride 0.9 % infusion, 75 mL/hr, Intravenous, Continuous, StinglJaimee MD, Last Rate: 75 mL/hr at 11/04/23 0543, 75 mL/hr at 11/04/23 0543  Review of Systems:    There is weakness and fatigue all other systems reviewed and negative    Objective     Vital Signs  Temp:  [97.3 °F (36.3 °C)-98.6 °F (37 °C)] 97.3 °F (36.3 °C)  Heart Rate:  [] 107  Resp:  [18] 18  BP: ()/(46-80) 110/58  Body mass index is 23.85 kg/m².  No intake or output data in the 24 hours ending 11/04/23 1307  No intake/output data recorded.     Physical Exam:   General: patient awake, alert and cooperative   Eyes: Normal lids and lashes, no scleral icterus   Neck: supple, normal ROM   Skin: warm and dry, not jaundiced   Cardiovascular: regular rhythm and rate, no murmurs auscultated   Pulm: clear to auscultation bilaterally, regular and unlabored   Abdomen: soft, nontender, nondistended; normal bowel sounds   Extremities: no rash or edema   Psychiatric: Normal mood and behavior; memory intact     Results Review:     I reviewed the patient's new clinical results.    Results from last 7 days   Lab Units 11/03/23  0540 11/02/23  1631   WBC 10*3/mm3 39.23* 44.77*   HEMOGLOBIN g/dL 7.6* 8.1*   HEMATOCRIT % 24.1* 24.6*   PLATELETS 10*3/mm3 413 522*     Results from last 7 days   Lab Units 11/04/23  0626 11/03/23  0540 11/02/23  1631   SODIUM mmol/L 139 135* 132*   POTASSIUM mmol/L 3.7 3.9 4.2   CHLORIDE mmol/L 106 101 98   CO2 mmol/L 19.0* 20.2* 20.0*   BUN mg/dL 42* 53* 55*   CREATININE mg/dL 1.79* 2.44* 2.96*   CALCIUM mg/dL 8.2* 8.4* 8.5*   BILIRUBIN mg/dL  --   --  0.5   ALK PHOS U/L  --   --  115   ALT (SGPT) U/L  --   --  17   AST (SGOT) U/L  --   --  22   GLUCOSE mg/dL 112* 93 148*         Lab Results   Lab Value Date/Time    LIPASE 34 11/02/2023 1631    LIPASE 17 08/13/2023 1023    LIPASE 16 05/03/2023 1534    LIPASE 14 02/17/2023 1130     LIPASE 10 (L) 02/04/2023 1419    LIPASE 13 01/23/2023 0216    LIPASE 20 11/21/2022 0250    LIPASE 38 08/31/2022 2131    LIPASE 16.0 02/09/2022 0525    LIPASE 50 06/29/2021 1131       Radiology:  CT Chest Without Contrast Diagnostic   Final Result       Redemonstration of pleural-based density in the lingula, showing   increased extension medially, representing a change from the chest CT   from 5/7/2023, may be rounded atelectasis, pneumonia, or neoplasm;   correlate clinically, PET/CT correlation can be obtained as indicated,   in addition to continued CT follow-up. Since the recent abdomen/pelvis   CT, consolidation has developed anteriorly laterally in the left lower   lobe suspicious for pneumonia. Fairly extensive groundglass and   consolidative opacities are present throughout both lungs, especially   mid to upper lungs that may represent edema and/or atypical pneumonia               This report was finalized on 11/4/2023 9:17 AM by Dr. Reilly Ritter M.D on Workstation: [id number]          CT Abdomen Pelvis Without Contrast   Final Result           1. A new indeterminate density in the lingula, as described.   Reticulonodular pulmonary opacities in the bilateral lower lobes.   Follow-up evaluation advised.       2. Colonic diverticulosis. No acute inflammatory process of bowel is   identified. Follow-up as indications persist.       3. No urolithiasis or hydronephrosis. Small arterial calcification of   the left renal hilum.       This report was finalized on 11/2/2023 7:22 PM by Dr. Reilly Ritter M.D on Workstation: [id number]              Assessment & Plan     Active Hospital Problems    Diagnosis     **Acute kidney injury     Myelofibrosis     Personal history of transient ischemic attack (TIA), 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 deficits     Hypertension     Type 2 diabetes mellitus with circulatory disorder, without long-term current use of insulin     Paroxysmal atrial fibrillation      CAD (coronary artery disease)     Myeloproliferative disorder        Assessment:  Abdominal pain  Malaise  Acute on chronic anemia  S/p CCY 11/2022  Myeloproliferative disorder with chronic elevation of WBC and anemia on Jakafi (Trell inhibitor)  CAD on Plavix (stents 2/2022)  A-fib on Eliquis     Plan:  Nursing staff to notify GI service on call if any change in clinical status.  If diarrhea recurs we will plan to send stool to assess for infectious etiology  Supportive care per primary team with IVFs and antiemetics.   Continue famotidine 20 mg daily   Continue to monitor H&H and transfuse PRN     Anemia:     2/2023 EGD/Colonoscopy and capsule study without visible GI source to anemia and patient denies evidence of overt GI bleeding.  If patient develops overt bleeding we will consider endoscopic evaluation.     Hemoglobin relatively stable     Diarrhea:     Discussed possible etiologies to acute diarrhea such as overflow diarrhea secondary to pelvic floor laxity noted on CT evaluation versus gastrointestinal infection.      Recommend patient start outpatient Konsyl fiber supplementation to promote complete emptying with outpatient follow-up with primary gastroenterologist for consideration of pelvic floor evaluation versus anorectal manometry testing.     No Further GI recommendations today     I discussed the patients findings and my recommendations with patient and nursing staff.    Guero Ferris MD

## 2023-11-05 PROBLEM — R91.8 PULMONARY INFILTRATE: Status: ACTIVE | Noted: 2023-11-02

## 2023-11-05 LAB
ALBUMIN SERPL-MCNC: 3.6 G/DL (ref 3.5–5.2)
ANION GAP SERPL CALCULATED.3IONS-SCNC: 10 MMOL/L (ref 5–15)
ANION GAP SERPL CALCULATED.3IONS-SCNC: 9.6 MMOL/L (ref 5–15)
BACTERIA SPEC AEROBE CULT: ABNORMAL
BUN SERPL-MCNC: 26 MG/DL (ref 8–23)
BUN SERPL-MCNC: 33 MG/DL (ref 8–23)
BUN/CREAT SERPL: 19.4 (ref 7–25)
BUN/CREAT SERPL: 21.2 (ref 7–25)
CALCIUM SPEC-SCNC: 7.9 MG/DL (ref 8.6–10.5)
CALCIUM SPEC-SCNC: 8.3 MG/DL (ref 8.6–10.5)
CHLORIDE SERPL-SCNC: 105 MMOL/L (ref 98–107)
CHLORIDE SERPL-SCNC: 108 MMOL/L (ref 98–107)
CO2 SERPL-SCNC: 21.4 MMOL/L (ref 22–29)
CO2 SERPL-SCNC: 22 MMOL/L (ref 22–29)
CREAT SERPL-MCNC: 1.34 MG/DL (ref 0.57–1)
CREAT SERPL-MCNC: 1.56 MG/DL (ref 0.57–1)
D-LACTATE SERPL-SCNC: 1.8 MMOL/L (ref 0.5–2)
D-LACTATE SERPL-SCNC: 3.2 MMOL/L (ref 0.5–2)
EGFRCR SERPLBLD CKD-EPI 2021: 32.4 ML/MIN/1.73
EGFRCR SERPLBLD CKD-EPI 2021: 38.9 ML/MIN/1.73
GEN 5 2HR TROPONIN T REFLEX: 31 NG/L
GLUCOSE SERPL-MCNC: 112 MG/DL (ref 65–99)
GLUCOSE SERPL-MCNC: 170 MG/DL (ref 65–99)
MAGNESIUM SERPL-MCNC: 1.9 MG/DL (ref 1.6–2.4)
PHOSPHATE SERPL-MCNC: 2 MG/DL (ref 2.5–4.5)
POTASSIUM SERPL-SCNC: 3.8 MMOL/L (ref 3.5–5.2)
POTASSIUM SERPL-SCNC: 3.9 MMOL/L (ref 3.5–5.2)
QT INTERVAL: 363 MS
QTC INTERVAL: 478 MS
SODIUM SERPL-SCNC: 137 MMOL/L (ref 136–145)
SODIUM SERPL-SCNC: 139 MMOL/L (ref 136–145)
TROPONIN T DELTA: -1 NG/L
TROPONIN T SERPL HS-MCNC: 32 NG/L
TROPONIN T SERPL HS-MCNC: 33 NG/L
TROPONIN T SERPL HS-MCNC: 36 NG/L

## 2023-11-05 PROCEDURE — 84484 ASSAY OF TROPONIN QUANT: CPT | Performed by: STUDENT IN AN ORGANIZED HEALTH CARE EDUCATION/TRAINING PROGRAM

## 2023-11-05 PROCEDURE — 80069 RENAL FUNCTION PANEL: CPT | Performed by: INTERNAL MEDICINE

## 2023-11-05 PROCEDURE — 25810000003 SODIUM CHLORIDE 0.9 % SOLUTION: Performed by: STUDENT IN AN ORGANIZED HEALTH CARE EDUCATION/TRAINING PROGRAM

## 2023-11-05 PROCEDURE — 25010000002 ERTAPENEM PER 500 MG: Performed by: NURSE PRACTITIONER

## 2023-11-05 PROCEDURE — 25010000002 HEPARIN (PORCINE) PER 1000 UNITS: Performed by: STUDENT IN AN ORGANIZED HEALTH CARE EDUCATION/TRAINING PROGRAM

## 2023-11-05 PROCEDURE — 84484 ASSAY OF TROPONIN QUANT: CPT | Performed by: INTERNAL MEDICINE

## 2023-11-05 PROCEDURE — 93005 ELECTROCARDIOGRAM TRACING: CPT | Performed by: INTERNAL MEDICINE

## 2023-11-05 PROCEDURE — 83605 ASSAY OF LACTIC ACID: CPT | Performed by: STUDENT IN AN ORGANIZED HEALTH CARE EDUCATION/TRAINING PROGRAM

## 2023-11-05 PROCEDURE — 25810000003 SODIUM CHLORIDE 0.9 % SOLUTION: Performed by: INTERNAL MEDICINE

## 2023-11-05 PROCEDURE — 93005 ELECTROCARDIOGRAM TRACING: CPT | Performed by: STUDENT IN AN ORGANIZED HEALTH CARE EDUCATION/TRAINING PROGRAM

## 2023-11-05 PROCEDURE — 93010 ELECTROCARDIOGRAM REPORT: CPT | Performed by: INTERNAL MEDICINE

## 2023-11-05 PROCEDURE — 83735 ASSAY OF MAGNESIUM: CPT | Performed by: INTERNAL MEDICINE

## 2023-11-05 PROCEDURE — 84484 ASSAY OF TROPONIN QUANT: CPT | Performed by: NURSE PRACTITIONER

## 2023-11-05 PROCEDURE — 80048 BASIC METABOLIC PNL TOTAL CA: CPT | Performed by: INTERNAL MEDICINE

## 2023-11-05 PROCEDURE — 99232 SBSQ HOSP IP/OBS MODERATE 35: CPT | Performed by: INTERNAL MEDICINE

## 2023-11-05 RX ORDER — MORPHINE SULFATE 2 MG/ML
2 INJECTION, SOLUTION INTRAMUSCULAR; INTRAVENOUS
Status: ACTIVE | OUTPATIENT
Start: 2023-11-05 | End: 2023-11-15

## 2023-11-05 RX ORDER — ERTAPENEM 1 G/1
1 INJECTION, POWDER, LYOPHILIZED, FOR SOLUTION INTRAMUSCULAR; INTRAVENOUS EVERY 24 HOURS
Status: DISCONTINUED | OUTPATIENT
Start: 2023-11-05 | End: 2023-11-06

## 2023-11-05 RX ORDER — HEPARIN SODIUM 5000 [USP'U]/ML
5000 INJECTION, SOLUTION INTRAVENOUS; SUBCUTANEOUS EVERY 8 HOURS SCHEDULED
Status: COMPLETED | OUTPATIENT
Start: 2023-11-05 | End: 2023-11-05

## 2023-11-05 RX ORDER — NITROGLYCERIN 0.4 MG/1
0.4 TABLET SUBLINGUAL
Status: DISCONTINUED | OUTPATIENT
Start: 2023-11-05 | End: 2023-11-22 | Stop reason: HOSPADM

## 2023-11-05 RX ORDER — SIMETHICONE 80 MG
80 TABLET,CHEWABLE ORAL 4 TIMES DAILY PRN
Status: DISCONTINUED | OUTPATIENT
Start: 2023-11-05 | End: 2023-11-22 | Stop reason: HOSPADM

## 2023-11-05 RX ORDER — HEPARIN SODIUM 5000 [USP'U]/ML
5000 INJECTION, SOLUTION INTRAVENOUS; SUBCUTANEOUS EVERY 8 HOURS SCHEDULED
Status: DISCONTINUED | OUTPATIENT
Start: 2023-11-05 | End: 2023-11-05

## 2023-11-05 RX ADMIN — NITROGLYCERIN 0.4 MG: 0.4 TABLET SUBLINGUAL at 01:00

## 2023-11-05 RX ADMIN — METOPROLOL SUCCINATE 25 MG: 25 TABLET, EXTENDED RELEASE ORAL at 08:58

## 2023-11-05 RX ADMIN — FAMOTIDINE 20 MG: 20 TABLET ORAL at 08:59

## 2023-11-05 RX ADMIN — SERTRALINE 100 MG: 100 TABLET, FILM COATED ORAL at 08:59

## 2023-11-05 RX ADMIN — SIMETHICONE 80 MG: 80 TABLET, CHEWABLE ORAL at 19:44

## 2023-11-05 RX ADMIN — HEPARIN SODIUM 5000 UNITS: 5000 INJECTION, SOLUTION INTRAVENOUS; SUBCUTANEOUS at 16:41

## 2023-11-05 RX ADMIN — Medication 3 MG: at 23:42

## 2023-11-05 RX ADMIN — SODIUM CHLORIDE 75 ML/HR: 9 INJECTION, SOLUTION INTRAVENOUS at 03:56

## 2023-11-05 RX ADMIN — Medication 250 MG: at 08:59

## 2023-11-05 RX ADMIN — Medication 250 MG: at 21:02

## 2023-11-05 RX ADMIN — NITROGLYCERIN 0.4 MG: 0.4 TABLET SUBLINGUAL at 21:57

## 2023-11-05 RX ADMIN — ERTAPENEM SODIUM 1 G: 1 INJECTION, POWDER, LYOPHILIZED, FOR SOLUTION INTRAMUSCULAR; INTRAVENOUS at 07:00

## 2023-11-05 RX ADMIN — SODIUM CHLORIDE 75 ML/HR: 9 INJECTION, SOLUTION INTRAVENOUS at 16:41

## 2023-11-05 RX ADMIN — LATANOPROST 1 DROP: 50 SOLUTION/ DROPS OPHTHALMIC at 21:03

## 2023-11-05 NOTE — PROGRESS NOTES
Name: Catherine Boyer ADMIT: 2023   : 1940  PCP: Kristi Moreau APRN    MRN: [id number] LOS: 3 days   AGE/SEX: 83 y.o. female  ROOM: [facility]     Subjective     No new changes.  Patient reports that she is fatigued.    Objective   Objective   Vital Signs  Temp:  [97.9 °F (36.6 °C)-98.4 °F (36.9 °C)] 98.4 °F (36.9 °C)  Heart Rate:  [] 104  Resp:  [18] 18  BP: ()/(42-75) 103/66  SpO2:  [95 %-99 %] 98 %  on  Flow (L/min):  [2] 2;   Device (Oxygen Therapy): nasal cannula  Body mass index is 23.85 kg/m².  Physical Exam  Constitutional:       General: She is not in acute distress.     Comments: Elderly, frail   HENT:      Head: Normocephalic and atraumatic.   Cardiovascular:      Rate and Rhythm: Normal rate and regular rhythm.   Pulmonary:      Effort: Pulmonary effort is normal. No respiratory distress.   Abdominal:      General: There is no distension.      Palpations: Abdomen is soft.      Tenderness: There is no abdominal tenderness.   Musculoskeletal:      Right lower leg: No edema.      Left lower leg: No edema.   Skin:     General: Skin is warm and dry.   Neurological:      General: No focal deficit present.      Mental Status: She is alert and oriented to person, place, and time.         Results Review     I reviewed the patient's new clinical results.  Results from last 7 days   Lab Units 23  0540 23  1631   WBC 10*3/mm3 39.23* 44.77*   HEMOGLOBIN g/dL 7.6* 8.1*   PLATELETS 10*3/mm3 413 522*     Results from last 7 days   Lab Units 23  0905 23  0124 23  0626 23  0540   SODIUM mmol/L 139 137 139 135*   POTASSIUM mmol/L 3.8 3.9 3.7 3.9   CHLORIDE mmol/L 108* 105 106 101   CO2 mmol/L 21.4* 22.0 19.0* 20.2*   BUN mg/dL 26* 33* 42* 53*   CREATININE mg/dL 1.34* 1.56* 1.79* 2.44*   GLUCOSE mg/dL 112* 170* 112* 93   Estimated Creatinine Clearance: 30.3 mL/min (A) (by C-G formula based on SCr of 1.34 mg/dL (H)).  Results from last 7 days   Lab Units  "11/05/23  0905 11/04/23  0626 11/02/23  1631   ALBUMIN g/dL 3.6 3.5 3.7   BILIRUBIN mg/dL  --   --  0.5   ALK PHOS U/L  --   --  115   AST (SGOT) U/L  --   --  22   ALT (SGPT) U/L  --   --  17     Results from last 7 days   Lab Units 11/05/23  0905 11/05/23  0124 11/04/23  0626 11/03/23  0540 11/02/23  1631   CALCIUM mg/dL 8.3* 7.9* 8.2* 8.4* 8.5*   ALBUMIN g/dL 3.6  --  3.5  --  3.7   MAGNESIUM mg/dL  --  1.9  --   --   --    PHOSPHORUS mg/dL 2.0*  --  3.3  --   --      Results from last 7 days   Lab Units 11/05/23  0905 11/05/23  0124 11/04/23  2247 11/04/23  1910 11/04/23  1603 11/04/23 0626   PROCALCITONIN ng/mL  --   --   --   --   --  0.09   LACTATE mmol/L 1.8 3.2* 3.1* 3.2*   < >  --     < > = values in this interval not displayed.     COVID19   Date Value Ref Range Status   08/13/2023 Not Detected Not Detected - Ref. Range Final   10/29/2022 Detected (C) Not Detected - Ref. Range Final     No results found for: \"HGBA1C\", \"POCGLU\"  No results found for this or any previous visit.      CT Chest Without Contrast Diagnostic  Narrative: CT CHEST WO CONTRAST DIAGNOSTIC-     INDICATIONS: Lung mass     TECHNIQUE: Radiation dose reduction techniques were utilized, including  automated exposure control and exposure modulation based on body size.  Unenhanced CHEST CT     COMPARISON: 5/7/2023     FINDINGS:           The heart size is borderline without pericardial effusion. Prominent  coronary arterial calcifications are apparent. Ascending aorta is  dilated, 3.8 cm, not significantly changed. A few small subcentimeter  short axis mediastinal lymph nodes are seen that are not significant by  size criteria. Assessment of vascular, mediastinal, and hilar structures  is limited without intravenous contrast material. Debris is seen in the  esophagus.     The airways appear clear.     No pleural effusion or pneumothorax.     The lungs show again show a pleural-based density in the lingula, axial  image 58, also present the " abdomen/pelvis CT from 11/2/2023 (and appears  similar apart from increased extension medially), but representing a  change from the chest CT from 5/7/2023, may be rounded atelectasis,  pneumonia, or neoplasm. Since the recent abdomen/pelvis CT,  consolidation has developed anteriorly laterally in the left lower lobe  suspicious for pneumonia. Fairly extensive groundglass and consolidative  opacities are present throughout both lungs, especially mid to upper  lungs that may represent edema and/or atypical pneumonia.        Upper abdominal structures show no acute findings. Gallbladder is  surgically absent. 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 Mild  nonspecific thickening of the adrenal glands.     Degenerative changes are seen in the spine, shoulders. No acute fracture  is identified.     Impression:    Redemonstration of pleural-based density in the lingula, showing  increased extension medially, representing a change from the chest CT  from 5/7/2023, may be rounded atelectasis, pneumonia, or neoplasm;  correlate clinically, PET/CT correlation can be obtained as indicated,  in addition to continued CT follow-up. Since the recent abdomen/pelvis  CT, consolidation has developed anteriorly laterally in the left lower  lobe suspicious for pneumonia. Fairly extensive groundglass and  consolidative opacities are present throughout both lungs, especially  mid to upper lungs that may represent edema and/or atypical pneumonia           This report was finalized on 11/4/2023 9:17 AM by Dr. Reilly Ritter M.D on Workstation: [id number]       Scheduled Medications  ertapenem, 1 g, Intramuscular, Q24H  famotidine, 20 mg, Oral, Daily  latanoprost, 1 drop, Both Eyes, Nightly  metoprolol succinate XL, 25 mg, Oral, Daily  Momelotinib Dihydrochloride, 200 mg, Oral, Daily  saccharomyces boulardii, 250 mg, Oral, BID  senna-docusate sodium, 2 tablet, Oral, BID  sertraline, 100 mg, Oral, Daily    Infusions  sodium chloride, 75  mL/hr, Last Rate: 125 mL/hr (11/05/23 0902)    Diet  NPO Diet NPO Type: Strict NPO  Diet: Cardiac Diets; Healthy Heart (2-3 Na+); Texture: Regular Texture (IDDSI 7); Fluid Consistency: Thin (IDDSI 0)       Assessment/Plan     Active Hospital Problems    Diagnosis  POA    **Acute kidney injury [N17.9]  Yes    Myelofibrosis [D75.81]  Yes    Personal history of transient ischemic attack (TIA), 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       83 y.o. female admitted with Acute kidney injury.    Urinary tract infection  Urine culture grew ESBL E. coli.  She has been started on ertapenem.    Acute renal failure  Most likely secondary to dehydration.  Diuretics being held.  Gentle IV fluids.  Nephrology following    Lung mass  Concern for an indeterminant mass from the lingula.    Bronchoscopy tomorrow    Congestive heart failure  Continue metoprolol.  Hold Lasix    Coronary artery disease  Paroxysmal atrial fibrillation  Hypertension  Eliquis and Plavix have been held. Continue metoprolol.  Hold lasix    Myeloproliferative disorder  Leukocytosis    Heparin for dvt ppx   Full code.  Discussed with patient, family, and nursing staff.  Anticipate discharge home with  vs SNU facility in 2-3 days.      Junior Doan MD  [facility]  11/05/23  10:55 EST

## 2023-11-05 NOTE — PROGRESS NOTES
Gastroenterology   Inpatient Progress Note    Reason for Follow Up: Anemia    Subjective     Interval History:   Patient without any complaints today.  No bleeding reported.  Multiple family members in the room with her today.  No diarrhea reported overnight.    Current Facility-Administered Medications:     acetaminophen (TYLENOL) tablet 650 mg, 650 mg, Oral, Q4H PRN, Jaimee Macias MD    sennosides-docusate (PERICOLACE) 8.6-50 MG per tablet 2 tablet, 2 tablet, Oral, BID **AND** polyethylene glycol (MIRALAX) packet 17 g, 17 g, Oral, Daily PRN **AND** bisacodyl (DULCOLAX) EC tablet 5 mg, 5 mg, Oral, Daily PRN **AND** bisacodyl (DULCOLAX) suppository 10 mg, 10 mg, Rectal, Daily PRN, Jaimee Macias MD    ertapenem (INVanz) injection 1 g, 1 g, Intramuscular, Q24H, Melanie Botello, APRN, 1 g at 11/05/23 0700    famotidine (PEPCID) tablet 20 mg, 20 mg, Oral, Daily, Jaimee Macias MD, 20 mg at 11/05/23 0859    heparin (porcine) 5000 UNIT/ML injection 5,000 Units, 5,000 Units, Subcutaneous, Q8H, Junior Doan MD    HYDROcodone-acetaminophen (NORCO) 5-325 MG per tablet 1 tablet, 1 tablet, Oral, Q6H PRN, Junior Doan MD, 1 tablet at 11/04/23 1932    latanoprost (XALATAN) 0.005 % ophthalmic solution 1 drop, 1 drop, Both Eyes, Nightly, Jaimee Macias MD, 1 drop at 11/04/23 2108    melatonin tablet 3 mg, 3 mg, Oral, Nightly PRN, Jaimee Macias MD    metoprolol succinate XL (TOPROL-XL) 24 hr tablet 25 mg, 25 mg, Oral, Daily, Jaimee Macias MD, 25 mg at 11/05/23 0858    Momelotinib Dihydrochloride (OJJAARA) 200 mg, 200 mg, Oral, Daily, Jaimee Macias MD, 200 mg at 11/05/23 0859    nitroglycerin (NITROSTAT) SL tablet 0.4 mg, 0.4 mg, Sublingual, Q5 Min PRN, Jennie Faye MD, 0.4 mg at 11/05/23 0100    ondansetron (ZOFRAN) tablet 4 mg, 4 mg, Oral, Q6H PRN **OR** ondansetron (ZOFRAN) injection 4 mg, 4 mg, Intravenous, Q6H PRN, Jaimee Macias MD     saccharomyces boulardii (FLORASTOR) capsule 250 mg, 250 mg, Oral, BID, Jaimee Macias MD, 250 mg at 11/05/23 0859    sertraline (ZOLOFT) tablet 100 mg, 100 mg, Oral, Daily, Jaimee Macias MD, 100 mg at 11/05/23 0859    Insert Peripheral IV, , , Once **AND** sodium chloride 0.9 % flush 10 mL, 10 mL, Intravenous, PRN, Tommy Clemente MD    sodium chloride 0.9 % infusion, 75 mL/hr, Intravenous, Continuous, Junior Doan MD, Last Rate: 75 mL/hr at 11/05/23 1404, 75 mL/hr at 11/05/23 1404  Review of Systems:    The following systems were reviewed and negative;  gastrointestinal    Objective     Vital Signs  Temp:  [97.9 °F (36.6 °C)-98.4 °F (36.9 °C)] 98.4 °F (36.9 °C)  Heart Rate:  [] 92  Resp:  [18] 18  BP: ()/(40-75) 102/40  Body mass index is 23.85 kg/m².  No intake or output data in the 24 hours ending 11/05/23 1541  No intake/output data recorded.     Physical Exam:   General: patient awake, alert and cooperative   Eyes: no scleral icterus   Skin: warm and dry, not jaundiced   Abdomen: soft, nontender, nondistended; normal bowel sounds, no masses palpated, no periumbical lymphadenopathy   Psychiatric: Appropriate affect and behavior     Results Review:     I reviewed the patient's new clinical results.    Results from last 7 days   Lab Units 11/03/23  0540 11/02/23  1631   WBC 10*3/mm3 39.23* 44.77*   HEMOGLOBIN g/dL 7.6* 8.1*   HEMATOCRIT % 24.1* 24.6*   PLATELETS 10*3/mm3 413 522*     Results from last 7 days   Lab Units 11/05/23  0905 11/05/23  0124 11/04/23  0626 11/03/23  0540 11/02/23  1631   SODIUM mmol/L 139 137 139   < > 132*   POTASSIUM mmol/L 3.8 3.9 3.7   < > 4.2   CHLORIDE mmol/L 108* 105 106   < > 98   CO2 mmol/L 21.4* 22.0 19.0*   < > 20.0*   BUN mg/dL 26* 33* 42*   < > 55*   CREATININE mg/dL 1.34* 1.56* 1.79*   < > 2.96*   CALCIUM mg/dL 8.3* 7.9* 8.2*   < > 8.5*   BILIRUBIN mg/dL  --   --   --   --  0.5   ALK PHOS U/L  --   --   --   --  115   ALT (SGPT) U/L  --   --    --   --  17   AST (SGOT) U/L  --   --   --   --  22   GLUCOSE mg/dL 112* 170* 112*   < > 148*    < > = values in this interval not displayed.         Lab Results   Lab Value Date/Time    LIPASE 34 11/02/2023 1631    LIPASE 17 08/13/2023 1023    LIPASE 16 05/03/2023 1534    LIPASE 14 02/17/2023 1130    LIPASE 10 (L) 02/04/2023 1419    LIPASE 13 01/23/2023 0216    LIPASE 20 11/21/2022 0250    LIPASE 38 08/31/2022 2131    LIPASE 16.0 02/09/2022 0525    LIPASE 50 06/29/2021 1131       Radiology:  CT Chest Without Contrast Diagnostic   Final Result       Redemonstration of pleural-based density in the lingula, showing   increased extension medially, representing a change from the chest CT   from 5/7/2023, may be rounded atelectasis, pneumonia, or neoplasm;   correlate clinically, PET/CT correlation can be obtained as indicated,   in addition to continued CT follow-up. Since the recent abdomen/pelvis   CT, consolidation has developed anteriorly laterally in the left lower   lobe suspicious for pneumonia. Fairly extensive groundglass and   consolidative opacities are present throughout both lungs, especially   mid to upper lungs that may represent edema and/or atypical pneumonia               This report was finalized on 11/4/2023 9:17 AM by Dr. Reilly Ritter M.D on Workstation: [id number]          CT Abdomen Pelvis Without Contrast   Final Result           1. A new indeterminate density in the lingula, as described.   Reticulonodular pulmonary opacities in the bilateral lower lobes.   Follow-up evaluation advised.       2. Colonic diverticulosis. No acute inflammatory process of bowel is   identified. Follow-up as indications persist.       3. No urolithiasis or hydronephrosis. Small arterial calcification of   the left renal hilum.       This report was finalized on 11/2/2023 7:22 PM by Dr. Reilly Ritter M.D on Workstation: [id number]              Assessment & Plan     Active Hospital Problems    Diagnosis      **Acute kidney injury     Myelofibrosis     Personal history of transient ischemic attack (TIA), 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 deficits     Hypertension     Type 2 diabetes mellitus with circulatory disorder, without long-term current use of insulin     Paroxysmal atrial fibrillation     CAD (coronary artery disease)     Myeloproliferative disorder      These problems are new to me  Assessment:  Diarrhea.  This seems to be resolving.  No diarrhea reported overnight.  Acute on chronic anemia.  Myeloproliferative disorder  Atrial fibrillation on Eliquis  Status post cholecystectomy  This post TIA      Plan:  Recommend fiber supplementation  May benefit from dietitian consult  Monitor H&H  I discussed the patients findings and my recommendations with patient, family, and nursing staff.    MD Wilber Toscano M.D.  [facility] [place] [address]  Office: [phone]

## 2023-11-05 NOTE — PROGRESS NOTES
[facility]     Progress Note    Patient Name: Catherine Boyer  : 1940  MRN: [id number]  Primary Care Physician:  Kristi Moreau APRN  Date of admission: 2023    Subjective   Subjective     Chief Complaint: KARLI    History of Present Illness  Patient with KARLI due to volume deplerion    Review of Systems    Objective   Objective     Vitals:   Temp:  [97.9 °F (36.6 °C)-98.4 °F (36.9 °C)] 98.4 °F (36.9 °C)  Heart Rate:  [] 104  Resp:  [18] 18  BP: ()/(42-75) 103/66  Flow (L/min):  [2] 2    Physical Exam   General Appearance:  In no acute distress.    HEENT: Normal HEENT exam.       Neurological: Patient is asleep in no distress    Result Review    Result Review:  I have personally reviewed the results from the time of this admission to 2023 11:12 EST and agree with these findings:  []  Laboratory list / accordion  []  Microbiology  []  Radiology  []  EKG/Telemetry   []  Cardiology/Vascular   []  Pathology  []  Old records  []  Other:  Most notable findings include:       Assessment & Plan   Assessment / Plan     Brief Patient Summary:  Catherine Boyer is a 83 y.o. female who has karli due to volume depletion    Active Hospital Problems:  Active Hospital Problems    Diagnosis     **Acute kidney injury     Myelofibrosis     Personal history of transient ischemic attack (TIA), 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 deficits     Hypertension     Type 2 diabetes mellitus with circulatory disorder, without long-term current use of insulin     Paroxysmal atrial fibrillation     CAD (coronary artery disease)     Myeloproliferative disorder      Plan:   Patient seen and examined. Currently asleep. Family at bedside. Labs and chart reviewed. Renal function improved with cr of 1.3. still with poor po intake. Will continue ivf for now    Nimisha Weiss MD

## 2023-11-06 LAB
ABO GROUP BLD: NORMAL
ALBUMIN SERPL-MCNC: 3.2 G/DL (ref 3.5–5.2)
ANION GAP SERPL CALCULATED.3IONS-SCNC: 8.1 MMOL/L (ref 5–15)
BLD GP AB SCN SERPL QL: NEGATIVE
BUN SERPL-MCNC: 16 MG/DL (ref 8–23)
BUN/CREAT SERPL: 17 (ref 7–25)
CALCIUM SPEC-SCNC: 8.1 MG/DL (ref 8.6–10.5)
CHLORIDE SERPL-SCNC: 109 MMOL/L (ref 98–107)
CO2 SERPL-SCNC: 20.9 MMOL/L (ref 22–29)
CREAT SERPL-MCNC: 0.94 MG/DL (ref 0.57–1)
DEPRECATED RDW RBC AUTO: 58.1 FL (ref 37–54)
EGFRCR SERPLBLD CKD-EPI 2021: 59.6 ML/MIN/1.73
ERYTHROCYTE [DISTWIDTH] IN BLOOD BY AUTOMATED COUNT: 17.4 % (ref 12.3–15.4)
GLUCOSE SERPL-MCNC: 95 MG/DL (ref 65–99)
HCT VFR BLD AUTO: 21 % (ref 34–46.6)
HCT VFR BLD AUTO: 25.5 % (ref 34–46.6)
HGB BLD-MCNC: 6.8 G/DL (ref 12–15.9)
HGB BLD-MCNC: 8.1 G/DL (ref 12–15.9)
MCH RBC QN AUTO: 29.8 PG (ref 26.6–33)
MCHC RBC AUTO-ENTMCNC: 32.4 G/DL (ref 31.5–35.7)
MCV RBC AUTO: 92.1 FL (ref 79–97)
PHOSPHATE SERPL-MCNC: 1.7 MG/DL (ref 2.5–4.5)
PLATELET # BLD AUTO: 271 10*3/MM3 (ref 140–450)
PMV BLD AUTO: 10.7 FL (ref 6–12)
POTASSIUM SERPL-SCNC: 3.5 MMOL/L (ref 3.5–5.2)
QT INTERVAL: 326 MS
QTC INTERVAL: 445 MS
RBC # BLD AUTO: 2.28 10*6/MM3 (ref 3.77–5.28)
RH BLD: POSITIVE
SODIUM SERPL-SCNC: 138 MMOL/L (ref 136–145)
T&S EXPIRATION DATE: NORMAL
TROPONIN T SERPL HS-MCNC: 37 NG/L
WBC NRBC COR # BLD: 41.43 10*3/MM3 (ref 3.4–10.8)

## 2023-11-06 PROCEDURE — P9016 RBC LEUKOCYTES REDUCED: HCPCS

## 2023-11-06 PROCEDURE — 85018 HEMOGLOBIN: CPT | Performed by: INTERNAL MEDICINE

## 2023-11-06 PROCEDURE — 85014 HEMATOCRIT: CPT | Performed by: INTERNAL MEDICINE

## 2023-11-06 PROCEDURE — 99222 1ST HOSP IP/OBS MODERATE 55: CPT | Performed by: INTERNAL MEDICINE

## 2023-11-06 PROCEDURE — 86900 BLOOD TYPING SEROLOGIC ABO: CPT

## 2023-11-06 PROCEDURE — 84484 ASSAY OF TROPONIN QUANT: CPT | Performed by: NURSE PRACTITIONER

## 2023-11-06 PROCEDURE — 36430 TRANSFUSION BLD/BLD COMPNT: CPT

## 2023-11-06 PROCEDURE — 99232 SBSQ HOSP IP/OBS MODERATE 35: CPT

## 2023-11-06 PROCEDURE — 85027 COMPLETE CBC AUTOMATED: CPT | Performed by: INTERNAL MEDICINE

## 2023-11-06 PROCEDURE — 86923 COMPATIBILITY TEST ELECTRIC: CPT

## 2023-11-06 PROCEDURE — 87449 NOS EACH ORGANISM AG IA: CPT | Performed by: INTERNAL MEDICINE

## 2023-11-06 PROCEDURE — 80069 RENAL FUNCTION PANEL: CPT | Performed by: INTERNAL MEDICINE

## 2023-11-06 PROCEDURE — 86850 RBC ANTIBODY SCREEN: CPT | Performed by: INTERNAL MEDICINE

## 2023-11-06 PROCEDURE — 86901 BLOOD TYPING SEROLOGIC RH(D): CPT | Performed by: INTERNAL MEDICINE

## 2023-11-06 PROCEDURE — 25010000002 ERTAPENEM PER 500 MG: Performed by: INTERNAL MEDICINE

## 2023-11-06 PROCEDURE — 86900 BLOOD TYPING SEROLOGIC ABO: CPT | Performed by: INTERNAL MEDICINE

## 2023-11-06 PROCEDURE — 25010000002 ONDANSETRON PER 1 MG: Performed by: INTERNAL MEDICINE

## 2023-11-06 RX ORDER — FENTANYL/ROPIVACAINE/NS/PF 2-625MCG/1
15 PLASTIC BAG, INJECTION (ML) EPIDURAL ONCE
Status: DISCONTINUED | OUTPATIENT
Start: 2023-11-06 | End: 2023-11-06

## 2023-11-06 RX ADMIN — ERTAPENEM SODIUM 1000 MG: 1 INJECTION, POWDER, LYOPHILIZED, FOR SOLUTION INTRAMUSCULAR; INTRAVENOUS at 10:17

## 2023-11-06 RX ADMIN — ONDANSETRON 4 MG: 2 INJECTION INTRAMUSCULAR; INTRAVENOUS at 11:56

## 2023-11-06 RX ADMIN — METOPROLOL SUCCINATE 25 MG: 25 TABLET, EXTENDED RELEASE ORAL at 10:17

## 2023-11-06 RX ADMIN — Medication 250 MG: at 21:19

## 2023-11-06 RX ADMIN — FAMOTIDINE 20 MG: 20 TABLET ORAL at 10:17

## 2023-11-06 RX ADMIN — SERTRALINE 100 MG: 100 TABLET, FILM COATED ORAL at 10:17

## 2023-11-06 RX ADMIN — Medication 250 MG: at 10:17

## 2023-11-06 RX ADMIN — Medication 2 PACKET: at 15:41

## 2023-11-06 RX ADMIN — SIMETHICONE 80 MG: 80 TABLET, CHEWABLE ORAL at 11:57

## 2023-11-06 RX ADMIN — Medication 3 MG: at 21:18

## 2023-11-06 RX ADMIN — LATANOPROST 1 DROP: 50 SOLUTION/ DROPS OPHTHALMIC at 21:19

## 2023-11-06 NOTE — PROGRESS NOTES
LOS: 4 days     Chief Complaint/ Reason for encounter: KARLI    Subjective   11/06/23 : Patient resting with family at bedside able provide some history  She is doing well overall no new complaints  Weight up today but she is not edematous, not short of breath  She is on 3 L oxygen which is near her home rate  Eating some, drinking well according to family      Medical history reviewed:  History of Present Illness    Subjective    History taken from: Patient and chart    Vital Signs  Temp:  [97.5 °F (36.4 °C)-98.8 °F (37.1 °C)] 97.5 °F (36.4 °C)  Heart Rate:  [] 105  Resp:  [18] 18  BP: (102-124)/(35-67) 124/58       Wt Readings from Last 1 Encounters:   11/06/23 0605 67.6 kg (149 lb 0.5 oz)   11/05/23 0507 62.6 kg (138 lb)   11/04/23 0539 62.6 kg (138 lb)   11/03/23 0556 63 kg (138 lb 14.2 oz)       Objective:  Vital signs: (most recent): Blood pressure 124/58, pulse 105, temperature 97.5 °F (36.4 °C), temperature source Oral, resp. rate 18, weight 67.6 kg (149 lb 0.5 oz), SpO2 99%.                Objective:  General Appearance:  Comfortable, well-appearing, in no acute distress and not in pain.  Awake, alert, oriented  HEENT: Mucous membranes moist, no injury, oropharynx clear  Lungs:  Normal effort and normal respiratory rate.  Breath sounds clear to auscultation.  No  respiratory distress.  No rales, decreased breath sounds or rhonchi.    Heart: Normal rate.  Regular rhythm.  S1, S2 normal.  No murmur.   Abdomen: Abdomen is soft.  Bowel sounds are normal, no abdominal tenderness.  There is no rebound or guarding  Extremities: Trace edema of bilateral lower extremities  Neurological: No focal motor or sensory deficits, pupils reactive  Skin:  Warm and dry.  No rash or cyanosis.       Results Review:    Intake/Output:     Intake/Output Summary (Last 24 hours) at 11/6/2023 1142  Last data filed at 11/5/2023 2110  Gross per 24 hour   Intake --   Output 600 ml   Net -600 ml         DATA:  Radiology and  Labs:  The following labs independently reviewed by me. Additional labs ordered for tomorrow a.m.  Interval notes, chart personally reviewed by me.   Old records independently reviewed showing normal baseline creatinine, history of CHF  The following radiologic studies independently viewed by me, findings CT chest showing pleural-based density  New problems include worsening anemia requiring transfusion   Discussed with patient's family at bedside    Risk/ complexity of medical care/ medical decision making moderate risk, KARLI, fluid and electrolyte management    Labs:   Recent Results (from the past 24 hour(s))   ECG 12 Lead Chest Pain    Collection Time: 11/05/23  7:49 PM   Result Value Ref Range    QT Interval 326 ms    QTC Interval 445 ms   High Sensitivity Troponin T    Collection Time: 11/05/23  8:19 PM    Specimen: Blood   Result Value Ref Range    HS Troponin T 33 (H) <10 ng/L   Renal Function Panel    Collection Time: 11/06/23  6:11 AM    Specimen: Blood   Result Value Ref Range    Glucose 95 65 - 99 mg/dL    BUN 16 8 - 23 mg/dL    Creatinine 0.94 0.57 - 1.00 mg/dL    Sodium 138 136 - 145 mmol/L    Potassium 3.5 3.5 - 5.2 mmol/L    Chloride 109 (H) 98 - 107 mmol/L    CO2 20.9 (L) 22.0 - 29.0 mmol/L    Calcium 8.1 (L) 8.6 - 10.5 mg/dL    Albumin 3.2 (L) 3.5 - 5.2 g/dL    Phosphorus 1.7 (C) 2.5 - 4.5 mg/dL    Anion Gap 8.1 5.0 - 15.0 mmol/L    BUN/Creatinine Ratio 17.0 7.0 - 25.0    eGFR 59.6 (L) >60.0 mL/min/1.73   High Sensitivity Troponin T    Collection Time: 11/06/23  6:11 AM    Specimen: Blood   Result Value Ref Range    HS Troponin T 37 (H) <10 ng/L   CBC (No Diff)    Collection Time: 11/06/23  6:11 AM    Specimen: Blood   Result Value Ref Range    WBC 41.43 (C) 3.40 - 10.80 10*3/mm3    RBC 2.28 (L) 3.77 - 5.28 10*6/mm3    Hemoglobin 6.8 (C) 12.0 - 15.9 g/dL    Hematocrit 21.0 (L) 34.0 - 46.6 %    MCV 92.1 79.0 - 97.0 fL    MCH 29.8 26.6 - 33.0 pg    MCHC 32.4 31.5 - 35.7 g/dL    RDW 17.4 (H) 12.3 -  15.4 %    RDW-SD 58.1 (H) 37.0 - 54.0 fl    MPV 10.7 6.0 - 12.0 fL    Platelets 271 140 - 450 10*3/mm3   Type & Screen    Collection Time: 11/06/23  7:28 AM    Specimen: Blood   Result Value Ref Range    ABO Type O     RH type Positive     Antibody Screen Negative     T&S Expiration Date 11/9/2023 11:59:59 PM    Prepare RBC, 1 Units    Collection Time: 11/06/23  8:34 AM   Result Value Ref Range    Product Code I7818S91     Unit Number [id number]     UNIT  ABO O     UNIT  RH POS     Crossmatch Interpretation Compatible     Dispense Status XM     Blood Expiration Date [id number]     Blood Type Barcode 5100        Radiology:  Pertinent radiology studies were reviewed as described above      Medications have been reviewed separately in chart overview      ASSESSMENT:  Acute kidney injury, prerenal, rapidly improving and near baseline  Chronic diastolic CHF  Nausea vomiting and dehydration, better  Left lingular lung mass  Myeloproliferative disorder  Chronic leukocytosis  Atrial fibrillation  Diarrhea, slowly resolving  Acute on chronic anemia  Diabetes mellitus  UTI on ertapenem, ESBL E. coli  Lung mass, biopsy planned       DISCUSSION/PLAN:   Renal function continues to improve with IV fluids, nearing baseline  We will stop IV fluids now that her diarrhea has improved and oral intake is adequate  We will hold diuretics another day or so especially since she will be having a procedure tomorrow  No evidence of volume overload on exam  No evidence of renal obstruction on CT   replacing phosphorus and potassium IV  Agree with transfusion     Continue to monitor electrolytes and volume closely    Please send me a secure chat message with any nonurgent questions regarding patient care.  For any urgent patient care issues please call my office number below.    Darrell Hanley MD  [facility]   Office phone number: [phone]  Answering service phone number: [phone]    11/06/23  11:42  EST    Dictation performed using Dragon dictation software

## 2023-11-06 NOTE — PROGRESS NOTES
Dr. DELL Alford    [address]        Patient ID:  Name:  Catherine Boeyr  MRN:  [id number]  1940  83 y.o.  female            CC/Reason for visit: Left lung opacity, bilateral pulmonary infiltrates, myelodysplastic syndrome     Interval hx: Patient was sleeping.  Denies any pain.  Remains still without Eliquis or Plavix.  Could not have bronchoscopy with biopsy today because of anemia this morning and requiring transfusion of blood.     ROS: Denies palpitations or abdominal pain    Vitals:  Vitals:    11/06/23 1209 11/06/23 1212 11/06/23 1251 11/06/23 1508   BP:  92/50 90/51 92/44   BP Location:  Left arm Left arm Left arm   Patient Position:  Lying  Lying   Pulse:  91 86    Resp:  16 16 16   Temp:  97.7 °F (36.5 °C) 98.4 °F (36.9 °C) 98.1 °F (36.7 °C)   TempSrc:  Oral Oral Oral   SpO2: (!) 68% 95%  98%   Weight:               Body mass index is 25.76 kg/m².    Intake/Output Summary (Last 24 hours) at 11/6/2023 1557  Last data filed at 11/6/2023 1508  Gross per 24 hour   Intake 308.33 ml   Output 1050 ml   Net -741.67 ml       Exam:  GEN:  No distress  Alert, oriented x 2, very hard of hearing.   LUNGS: Few scattered rhonchi bilat, no use of accessory muscles  CV:  Normal S1S2, without murmur, no edema  ABD:  Non tender, no enlarged liver or masses      Scheduled meds:  ertapenem, 1,000 mg, Intravenous, Q24H  famotidine, 20 mg, Oral, Daily  latanoprost, 1 drop, Both Eyes, Nightly  metoprolol succinate XL, 25 mg, Oral, Daily  Momelotinib Dihydrochloride, 200 mg, Oral, Daily  saccharomyces boulardii, 250 mg, Oral, BID  senna-docusate sodium, 2 tablet, Oral, BID  sertraline, 100 mg, Oral, Daily      IV meds:                           Data Review:   I reviewed the patient's medications and new clinical results.            Results from last 7 days   Lab Units 11/06/23  0611 11/05/23  0905 11/05/23  0124 11/04/23  0626 11/03/23  0540 11/02/23  1631   SODIUM mmol/L 138 139 137 139 135* 132*    POTASSIUM mmol/L 3.5 3.8 3.9 3.7 3.9 4.2   CHLORIDE mmol/L 109* 108* 105 106 101 98   CO2 mmol/L 20.9* 21.4* 22.0 19.0* 20.2* 20.0*   BUN mg/dL 16 26* 33* 42* 53* 55*   CREATININE mg/dL 0.94 1.34* 1.56* 1.79* 2.44* 2.96*   CALCIUM mg/dL 8.1* 8.3* 7.9* 8.2* 8.4* 8.5*   BILIRUBIN mg/dL  --   --   --   --   --  0.5   ALK PHOS U/L  --   --   --   --   --  115   ALT (SGPT) U/L  --   --   --   --   --  17   AST (SGOT) U/L  --   --   --   --   --  22   GLUCOSE mg/dL 95 112* 170* 112* 93 148*   WBC 10*3/mm3 41.43*  --   --   --  39.23* 44.77*   HEMOGLOBIN g/dL 6.8*  --   --   --  7.6* 8.1*   PLATELETS 10*3/mm3 271  --   --   --  413 522*   PROCALCITONIN ng/mL  --   --   --  0.09  --   --      Results from last 7 days   Lab Units 11/04/23  1603 11/03/23  1603   BLOODCX  No growth at 2 days  No growth at 2 days  --    URINECX   --  >100,000 CFU/mL Escherichia coli ESBL*           ASSESSMENT:   Bilateral pulmonary infiltrates  Lingula pleural-based opacity  Acute kidney injury  CAD (coronary artery disease)  Paroxysmal atrial fibrillation  Myeloproliferative disorder  Type 2 diabetes mellitus with circulatory disorder, without long-term current use of insulin  Hypertension   Personal history of transient ischemic attack (TIA), 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 deficits  Hypoxemia      PLAN:  Bronchoscopy with biopsy had to be canceled today due to anemia.  She required blood transfusion.  We are on schedule for tomorrow 10:30 AM for bronchoscopy with lung biopsies.  She has been on ertapenem for several days.  Appears euvolemic now.  Has received enough diuretics for her aortic stenosis and chronic diastolic CHF.  Continue with supplemental oxygen as needed      Raoul Alford MD  11/6/2023

## 2023-11-06 NOTE — PROGRESS NOTES
[facility]  Inpatient Progress Note    Reason for Follow Up:  Anemia    Subjective     Interval History:     Patient seen and examined at bedside this morning with daughter present with patient permission.  She reports that her diarrhea has been improving.  Denies any black stools or bloody stools.  Patient denies nausea, vomiting or abdominal pain today.  She did eat a little bit of her dinner and tolerated that well according to her daughters.  Her hemoglobin did drop a bit this morning down to 6.8 from 8.1 yesterday.  Denies any hematemesis or coffee-ground emesis.  At time of exam this morning, most recent blood pressure 124/58.  She is receiving 1 unit PRBCs per primary.    According to chart review patient was complaining of chest pain this morning and had an EKG which revealed sinus tachycardia and troponin of 33.    Hemoglobin 8.1 on admission down to 6.8 this morning.  WBC elevated at 41.43.  Patient is supposed to receive 1 unit PRBCs.    February 2023 underwent EGD/colonoscopy and capsule study without possible GI source to anemia.  Patient denies evidence of overt GI bleeding.    Current Facility-Administered Medications:     acetaminophen (TYLENOL) tablet 650 mg, 650 mg, Oral, Q4H PRN, Jaimee Macias MD    sennosides-docusate (PERICOLACE) 8.6-50 MG per tablet 2 tablet, 2 tablet, Oral, BID **AND** polyethylene glycol (MIRALAX) packet 17 g, 17 g, Oral, Daily PRN **AND** bisacodyl (DULCOLAX) EC tablet 5 mg, 5 mg, Oral, Daily PRN **AND** bisacodyl (DULCOLAX) suppository 10 mg, 10 mg, Rectal, Daily PRN, Jaimee Macias MD    ertapenem (INVanz) injection 1 g, 1 g, Intramuscular, Q24H, Melanie Botello, ABEL, 1 g at 11/05/23 0700    famotidine (PEPCID) tablet 20 mg, 20 mg, Oral, Daily, StingJaimee wharton MD, 20 mg at 11/05/23 0859    HYDROcodone-acetaminophen (NORCO) 5-325 MG per tablet 1 tablet, 1 tablet, Oral, Q6H PRN, Junior Doan MD, 1 tablet at 11/04/23  1932    latanoprost (XALATAN) 0.005 % ophthalmic solution 1 drop, 1 drop, Both Eyes, Nightly, Jaimee Macias MD, 1 drop at 11/05/23 2103    melatonin tablet 3 mg, 3 mg, Oral, Nightly PRN, Jaimee Macias MD, 3 mg at 11/05/23 2342    metoprolol succinate XL (TOPROL-XL) 24 hr tablet 25 mg, 25 mg, Oral, Daily, Jaimee Macias MD, 25 mg at 11/05/23 0858    Momelotinib Dihydrochloride (OJJAARA) 200 mg, 200 mg, Oral, Daily, Jaimee Macias MD, 200 mg at 11/05/23 0859    morphine injection 2 mg, 2 mg, Intravenous, Q3H PRN, Dexter Reynolds, ABEL    nitroglycerin (NITROSTAT) SL tablet 0.4 mg, 0.4 mg, Sublingual, Q5 Min PRN, Jennie Faye MD, 0.4 mg at 11/05/23 2157    ondansetron (ZOFRAN) tablet 4 mg, 4 mg, Oral, Q6H PRN **OR** ondansetron (ZOFRAN) injection 4 mg, 4 mg, Intravenous, Q6H PRN, Jaimee Macias MD    Phosphorus Replacement - Follow Nurse / BPA Driven Protocol, , Does not apply, PRN, Junior Doan MD    potassium phosphate 15 mmol in 0.9% normal saline 250 mL IVPB, 15 mmol, Intravenous, Once, Kenton Meeks MD    saccharomyces boulardii (FLORASTOR) capsule 250 mg, 250 mg, Oral, BID, Jaimee Macias MD, 250 mg at 11/05/23 2102    sertraline (ZOLOFT) tablet 100 mg, 100 mg, Oral, Daily, Jaimee Macias MD, 100 mg at 11/05/23 0859    simethicone (MYLICON) chewable tablet 80 mg, 80 mg, Oral, 4x Daily PRN, Dexter Reynolds, APRN, 80 mg at 11/05/23 1944    Insert Peripheral IV, , , Once **AND** sodium chloride 0.9 % flush 10 mL, 10 mL, Intravenous, PRN, Tommy Clemente MD    sodium chloride 0.9 % infusion, 75 mL/hr, Intravenous, Continuous, Junior Doan MD, Last Rate: 75 mL/hr at 11/05/23 1641, 75 mL/hr at 11/05/23 1641      Objective     Vital Signs  Temp:  [97.5 °F (36.4 °C)-98.8 °F (37.1 °C)] 97.5 °F (36.4 °C)  Heart Rate:  [] 105  Resp:  [18] 18  BP: (102-124)/(35-67) 124/58  Body mass index is 25.76 kg/m².    Intake/Output Summary (Last  24 hours) at 11/6/2023 0751  Last data filed at 11/5/2023 2110  Gross per 24 hour   Intake --   Output 600 ml   Net -600 ml     No intake/output data recorded.     Physical Exam:   General: patient awake, alert and cooperative   Eyes: Normal lids and lashes, no scleral icterus   Neck: supple, normal ROM   Skin: warm and dry, not jaundiced   Cardiovascular: regular rhythm and rate   Pulm: regular and unlabored   Abdomen: soft, nontender, nondistended; normal bowel sounds     Results Review:     I reviewed the patient's new clinical results.    Results from last 7 days   Lab Units 11/06/23  0611 11/03/23  0540 11/02/23  1631   WBC 10*3/mm3 41.43* 39.23* 44.77*   HEMOGLOBIN g/dL 6.8* 7.6* 8.1*   HEMATOCRIT % 21.0* 24.1* 24.6*   PLATELETS 10*3/mm3 271 413 522*     Results from last 7 days   Lab Units 11/06/23  0611 11/05/23  0905 11/05/23  0124 11/03/23  0540 11/02/23  1631   SODIUM mmol/L 138 139 137   < > 132*   POTASSIUM mmol/L 3.5 3.8 3.9   < > 4.2   CHLORIDE mmol/L 109* 108* 105   < > 98   CO2 mmol/L 20.9* 21.4* 22.0   < > 20.0*   BUN mg/dL 16 26* 33*   < > 55*   CREATININE mg/dL 0.94 1.34* 1.56*   < > 2.96*   CALCIUM mg/dL 8.1* 8.3* 7.9*   < > 8.5*   BILIRUBIN mg/dL  --   --   --   --  0.5   ALK PHOS U/L  --   --   --   --  115   ALT (SGPT) U/L  --   --   --   --  17   AST (SGOT) U/L  --   --   --   --  22   GLUCOSE mg/dL 95 112* 170*   < > 148*    < > = values in this interval not displayed.         Lab Results   Lab Value Date/Time    LIPASE 34 11/02/2023 1631    LIPASE 17 08/13/2023 1023    LIPASE 16 05/03/2023 1534    LIPASE 14 02/17/2023 1130    LIPASE 10 (L) 02/04/2023 1419    LIPASE 13 01/23/2023 0216    LIPASE 20 11/21/2022 0250    LIPASE 38 08/31/2022 2131    LIPASE 16.0 02/09/2022 0525    LIPASE 50 06/29/2021 1131     Radiology:  CT Chest Without Contrast Diagnostic   Final Result       Redemonstration of pleural-based density in the lingula, showing   increased extension medially, representing a change  from the chest CT   from 5/7/2023, may be rounded atelectasis, pneumonia, or neoplasm;   correlate clinically, PET/CT correlation can be obtained as indicated,   in addition to continued CT follow-up. Since the recent abdomen/pelvis   CT, consolidation has developed anteriorly laterally in the left lower   lobe suspicious for pneumonia. Fairly extensive groundglass and   consolidative opacities are present throughout both lungs, especially   mid to upper lungs that may represent edema and/or atypical pneumonia               This report was finalized on 11/4/2023 9:17 AM by Dr. Reilly Ritter M.D on Workstation: [id number]          CT Abdomen Pelvis Without Contrast   Final Result           1. A new indeterminate density in the lingula, as described.   Reticulonodular pulmonary opacities in the bilateral lower lobes.   Follow-up evaluation advised.       2. Colonic diverticulosis. No acute inflammatory process of bowel is   identified. Follow-up as indications persist.       3. No urolithiasis or hydronephrosis. Small arterial calcification of   the left renal hilum.       This report was finalized on 11/2/2023 7:22 PM by Dr. Reilly Ritter M.D on Workstation: [id number]              Assessment & Plan     Active Hospital Problems    Diagnosis     **Acute kidney injury     Pulmonary infiltrate     Myelofibrosis     Personal history of transient ischemic attack (TIA), 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 deficits     Hypertension     Type 2 diabetes mellitus with circulatory disorder, without long-term current use of insulin     Paroxysmal atrial fibrillation     CAD (coronary artery disease)     Myeloproliferative disorder        Assessment:  Diarrhea.  This seems to be resolving.  No diarrhea reported overnight  Acute on chronic anemia  Myeloproliferative disorder  A-fib on Eliquis  Status post cholecystectomy  Status post TIA    All problems are new to me today     Plan:  If diarrhea recurs we will plan  to send stool to asses for infectious etiology.  Recommend fiber supplement  Supportive care per primary team with IVFs and antibiotics.   Continue famotine 20mg daily   Continue to moinitor H&H and transfuse as needed per primary to maintain hemoglobin greater than 7  Nursing staff to notify GI service on call if any change in clinical status    Future plans depending on clinical course and per attending, Dr. Annika Garibay PA-C

## 2023-11-06 NOTE — PLAN OF CARE
Goal Outcome Evaluation:  Plan of Care Reviewed With: patient        Progress: improving       Pt a/o x4, 2L NC, VSS. Hgb 6.8 this AM, 1u prbc transfused. No sign of bleeding or black stools. Pt had complained of chest pain the past 2 nights, cards consulted today. Bronch rescheduled for tomorrow. NPO @ [facility]. Will CTM.

## 2023-11-06 NOTE — CASE MANAGEMENT/SOCIAL WORK
Continued Stay Note  [facility]     Patient Name: Catherine Boyer  MRN: [id number]  Today's Date: 11/6/2023    Admit Date: 11/2/2023    Plan[facility]   Discharge Plan       Row Name 11/06/23 1258       Plan    Plan Return [facility]    Plan Comments S/W Dahlia/ [facility] ([phone]) who confirms pt can return if able to ambulate.  Pt is current w/ [facility]  - Shaina/ [facility] is following. ...........Cherry CONTRERAS/ RENITA                   Discharge Codes    No documentation.                       Cherry Bowen RN

## 2023-11-06 NOTE — PROGRESS NOTES
Name: Catherine Boyer ADMIT: 2023   : 1940  PCP: Kristi Moreau APRN    MRN: [id number] LOS: 4 days   AGE/SEX: 83 y.o. female  ROOM: [facility]     Subjective     No acute events. Patient denies new complaints.   Daughters are at bedside.    Objective   Objective   Vital Signs  Temp:  [97.5 °F (36.4 °C)-98.8 °F (37.1 °C)] 97.5 °F (36.4 °C)  Heart Rate:  [] 105  Resp:  [18] 18  BP: (103-124)/(35-67) 124/58  SpO2:  [96 %-100 %] 99 %  on   ;   Device (Oxygen Therapy): room air  Body mass index is 25.76 kg/m².  Physical Exam  Vitals and nursing note reviewed.   Constitutional:       General: She is not in acute distress.     Appearance: She is not toxic-appearing.      Comments: Elderly, frail   HENT:      Head: Normocephalic and atraumatic.      Nose: Nose normal.      Mouth/Throat:      Mouth: Mucous membranes are moist.      Pharynx: Oropharynx is clear.   Eyes:      Conjunctiva/sclera: Conjunctivae normal.      Pupils: Pupils are equal, round, and reactive to light.   Cardiovascular:      Rate and Rhythm: Normal rate and regular rhythm.   Pulmonary:      Effort: Pulmonary effort is normal.      Breath sounds: Examination of the right-lower field reveals decreased breath sounds. Examination of the left-lower field reveals decreased breath sounds. Decreased breath sounds present.   Abdominal:      General: Bowel sounds are normal. There is no distension.      Palpations: Abdomen is soft.      Tenderness: There is no abdominal tenderness.   Musculoskeletal:         General: No swelling or tenderness.      Cervical back: Neck supple.   Skin:     General: Skin is warm and dry.      Capillary Refill: Capillary refill takes less than 2 seconds.   Neurological:      General: No focal deficit present.      Mental Status: She is alert.      Comments: Hard of hearing       Results Review     I reviewed the patient's new clinical results.  Results from last 7 days   Lab Units 23  0611 23  0546  "11/02/23  1631   WBC 10*3/mm3 41.43* 39.23* 44.77*   HEMOGLOBIN g/dL 6.8* 7.6* 8.1*   PLATELETS 10*3/mm3 271 413 522*     Results from last 7 days   Lab Units 11/06/23  0611 11/05/23  0905 11/05/23 0124 11/04/23 0626   SODIUM mmol/L 138 139 137 139   POTASSIUM mmol/L 3.5 3.8 3.9 3.7   CHLORIDE mmol/L 109* 108* 105 106   CO2 mmol/L 20.9* 21.4* 22.0 19.0*   BUN mg/dL 16 26* 33* 42*   CREATININE mg/dL 0.94 1.34* 1.56* 1.79*   GLUCOSE mg/dL 95 112* 170* 112*   Estimated Creatinine Clearance: 41.2 mL/min (by C-G formula based on SCr of 0.94 mg/dL).  Results from last 7 days   Lab Units 11/06/23  0611 11/05/23 0905 11/04/23 0626 11/02/23  1631   ALBUMIN g/dL 3.2* 3.6 3.5 3.7   BILIRUBIN mg/dL  --   --   --  0.5   ALK PHOS U/L  --   --   --  115   AST (SGOT) U/L  --   --   --  22   ALT (SGPT) U/L  --   --   --  17     Results from last 7 days   Lab Units 11/06/23  0611 11/05/23  0905 11/05/23 0124 11/04/23 0626 11/03/23  0540 11/02/23  1631   CALCIUM mg/dL 8.1* 8.3* 7.9* 8.2*   < > 8.5*   ALBUMIN g/dL 3.2* 3.6  --  3.5  --  3.7   MAGNESIUM mg/dL  --   --  1.9  --   --   --    PHOSPHORUS mg/dL 1.7* 2.0*  --  3.3  --   --     < > = values in this interval not displayed.     Results from last 7 days   Lab Units 11/05/23  0905 11/05/23  0124 11/04/23 2247 11/04/23  1910 11/04/23  1603 11/04/23 0626   PROCALCITONIN ng/mL  --   --   --   --   --  0.09   LACTATE mmol/L 1.8 3.2* 3.1* 3.2*   < >  --     < > = values in this interval not displayed.     COVID19   Date Value Ref Range Status   08/13/2023 Not Detected Not Detected - Ref. Range Final   10/29/2022 Detected (C) Not Detected - Ref. Range Final     No results found for: \"HGBA1C\", \"POCGLU\"  Results for orders placed or performed during the hospital encounter of 11/02/23   Blood Culture - Blood, Arm, Right    Specimen: Arm, Right; Blood   Result Value Ref Range    Blood Culture No growth at 24 hours          CT Chest Without Contrast Diagnostic  Narrative: CT CHEST WO " CONTRAST DIAGNOSTIC-     INDICATIONS: Lung mass     TECHNIQUE: Radiation dose reduction techniques were utilized, including  automated exposure control and exposure modulation based on body size.  Unenhanced CHEST CT     COMPARISON: 5/7/2023     FINDINGS:           The heart size is borderline without pericardial effusion. Prominent  coronary arterial calcifications are apparent. Ascending aorta is  dilated, 3.8 cm, not significantly changed. A few small subcentimeter  short axis mediastinal lymph nodes are seen that are not significant by  size criteria. Assessment of vascular, mediastinal, and hilar structures  is limited without intravenous contrast material. Debris is seen in the  esophagus.     The airways appear clear.     No pleural effusion or pneumothorax.     The lungs show again show a pleural-based density in the lingula, axial  image 58, also present the abdomen/pelvis CT from 11/2/2023 (and appears  similar apart from increased extension medially), but representing a  change from the chest CT from 5/7/2023, may be rounded atelectasis,  pneumonia, or neoplasm. Since the recent abdomen/pelvis CT,  consolidation has developed anteriorly laterally in the left lower lobe  suspicious for pneumonia. Fairly extensive groundglass and consolidative  opacities are present throughout both lungs, especially mid to upper  lungs that may represent edema and/or atypical pneumonia.        Upper abdominal structures show no acute findings. Gallbladder is  surgically absent. 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 Mild  nonspecific thickening of the adrenal glands.     Degenerative changes are seen in the spine, shoulders. No acute fracture  is identified.     Impression:    Redemonstration of pleural-based density in the lingula, showing  increased extension medially, representing a change from the chest CT  from 5/7/2023, may be rounded atelectasis, pneumonia, or neoplasm;  correlate clinically, PET/CT correlation can  be obtained as indicated,  in addition to continued CT follow-up. Since the recent abdomen/pelvis  CT, consolidation has developed anteriorly laterally in the left lower  lobe suspicious for pneumonia. Fairly extensive groundglass and  consolidative opacities are present throughout both lungs, especially  mid to upper lungs that may represent edema and/or atypical pneumonia           This report was finalized on 11/4/2023 9:17 AM by Dr. Reilly Ritter M.D on Workstation: [facility]       Scheduled Medications  ertapenem, 1,000 mg, Intravenous, Q24H  famotidine, 20 mg, Oral, Daily  latanoprost, 1 drop, Both Eyes, Nightly  metoprolol succinate XL, 25 mg, Oral, Daily  Momelotinib Dihydrochloride, 200 mg, Oral, Daily  potassium phosphate, 15 mmol, Intravenous, Once  saccharomyces boulardii, 250 mg, Oral, BID  senna-docusate sodium, 2 tablet, Oral, BID  sertraline, 100 mg, Oral, Daily    Infusions     Diet  Diet: Cardiac Diets; Healthy Heart (2-3 Na+); Texture: Regular Texture (IDDSI 7); Fluid Consistency: Thin (IDDSI 0)    I reviewed Imaging, labs, microbiology, ECG/telemetry, and records.        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   Acute Cystitis without Hematuria  - urine culture growing ESBL  - continue ertapenem    KARLI  - likely from volume depletion from reduced oral intake and diarrhea in the setting of  diuretic treatment  - resolved with IVF, these are now stopped  - continue holding diuretics  - appreciate nephrology recs    Lung mass  - indeterminate, lingular   - appreciate pulmonology recs, plans for bronchoscopy today noted    Aortic Stenosis/Chronic Diastolic CHF  - appears euvolemic  - continue to monitor volume status off of diuretics    Coronary artery disease  Paroxysmal atrial fibrillation  Hypertension  - Eliquis and Plavix held for planned bronchoscopy  - HR and BP controlled, continue metoprolol    Myeloproliferative disorder  - has chronic leukocytosis  - anemia is worse today, transfuse 1 unit of PRBCs      SCDs for dvt ppx   Full code.  Discussed with patient, family, and nursing staff.  Anticipate discharge home with HH vs SNU facility in 2-3 days.      Kenton Meeks MD  [facility]  11/06/23  12:02 EST

## 2023-11-06 NOTE — PLAN OF CARE
Goal Outcome Evaluation:  Plan of Care Reviewed With: patient met complaining of chest pain, EKG revealed sinus tachycardia, with Troponin of 33. Order was received for simethicone which temporarily relieved her pain. At about 2152 attention was drawn to patient who was found to be having chest pain again at rest, sublingual nitroglycerin was administered with good effect. She had multiple bowel movements during the shift. She has been npo since midnight for a scheduled Bronchoscopy and lung biopsy. Patient had low hemoglobin which last resulted on 11/3, [facility] was paged about it and a new order was received for a recheck this morning.

## 2023-11-06 NOTE — PLAN OF CARE
Problem: Adult Inpatient Plan of Care  Goal: Plan of Care Review  Outcome: Ongoing, Progressing  Flowsheets (Taken 11/5/2023 1938)  Progress: no change  Plan of Care Reviewed With: patient  Goal: Patient-Specific Goal (Individualized)  Outcome: Ongoing, Progressing  Goal: Absence of Hospital-Acquired Illness or Injury  Outcome: Ongoing, Progressing  Intervention: Identify and Manage Fall Risk  Recent Flowsheet Documentation  Taken 11/5/2023 1827 by Felix Owens RN  Safety Promotion/Fall Prevention:   activity supervised   assistive device/personal items within reach   clutter free environment maintained   toileting scheduled   safety round/check completed   room organization consistent   nonskid shoes/slippers when out of bed   fall prevention program maintained  Taken 11/5/2023 1600 by Felix Owens RN  Safety Promotion/Fall Prevention:   activity supervised   assistive device/personal items within reach   clutter free environment maintained   toileting scheduled   safety round/check completed   room organization consistent   nonskid shoes/slippers when out of bed   fall prevention program maintained  Taken 11/5/2023 1405 by Felix Owens RN  Safety Promotion/Fall Prevention:   activity supervised   assistive device/personal items within reach   clutter free environment maintained   toileting scheduled   safety round/check completed   room organization consistent   nonskid shoes/slippers when out of bed   fall prevention program maintained  Taken 11/5/2023 1205 by Felix Owens, RN  Safety Promotion/Fall Prevention:   activity supervised   assistive device/personal items within reach   clutter free environment maintained   toileting scheduled   safety round/check completed   room organization consistent   nonskid shoes/slippers when out of bed   fall prevention program maintained  Taken 11/5/2023 1001 by Felix Owens, RN  Safety Promotion/Fall Prevention:   activity supervised   assistive  device/personal items within reach   clutter free environment maintained   toileting scheduled   safety round/check completed   room organization consistent   nonskid shoes/slippers when out of bed   fall prevention program maintained  Taken 11/5/2023 0800 by Felix Owens RN  Safety Promotion/Fall Prevention:   activity supervised   assistive device/personal items within reach   clutter free environment maintained   toileting scheduled   safety round/check completed   room organization consistent   nonskid shoes/slippers when out of bed   fall prevention program maintained  Intervention: Prevent Skin Injury  Recent Flowsheet Documentation  Taken 11/5/2023 1827 by Felix Owens RN  Body Position:   position changed independently   sitting up in bed  Taken 11/5/2023 1600 by Felix Owens RN  Body Position:   position changed independently   sitting up in bed  Taken 11/5/2023 1405 by Felix Owens RN  Body Position:   position changed independently   sitting up in bed  Taken 11/5/2023 1205 by Felix Owens RN  Body Position:   position changed independently   sitting up in bed  Taken 11/5/2023 1001 by Felix Owens RN  Body Position:   position changed independently   sitting up in bed  Taken 11/5/2023 0800 by Felix Owens RN  Body Position:   position changed independently   sitting up in bed  Intervention: Prevent Infection  Recent Flowsheet Documentation  Taken 11/5/2023 1827 by Felix Owens RN  Infection Prevention:   hand hygiene promoted   rest/sleep promoted  Taken 11/5/2023 1600 by Felix Owens RN  Infection Prevention:   hand hygiene promoted   rest/sleep promoted  Taken 11/5/2023 1405 by Felix Owens RN  Infection Prevention:   hand hygiene promoted   rest/sleep promoted  Taken 11/5/2023 1205 by Felix Owens RN  Infection Prevention:   hand hygiene promoted   rest/sleep promoted  Taken 11/5/2023 1001 by Felix Owens RN  Infection Prevention:   hand hygiene promoted    rest/sleep promoted  Taken 11/5/2023 0800 by Felix Owens RN  Infection Prevention:   hand hygiene promoted   rest/sleep promoted  Goal: Optimal Comfort and Wellbeing  Outcome: Ongoing, Progressing  Goal: Readiness for Transition of Care  Outcome: Ongoing, Progressing     Problem: Adjustment to Illness (Sepsis/Septic Shock)  Goal: Optimal Coping  Outcome: Ongoing, Progressing     Problem: Bleeding (Sepsis/Septic Shock)  Goal: Absence of Bleeding  Outcome: Ongoing, Progressing     Problem: Glycemic Control Impaired (Sepsis/Septic Shock)  Goal: Blood Glucose Level Within Desired Range  Outcome: Ongoing, Progressing     Problem: Infection Progression (Sepsis/Septic Shock)  Goal: Absence of Infection Signs and Symptoms  Outcome: Ongoing, Progressing  Intervention: Initiate Sepsis Management  Recent Flowsheet Documentation  Taken 11/5/2023 1827 by Felix Owens RN  Infection Prevention:   hand hygiene promoted   rest/sleep promoted  Isolation Precautions:   spore   contact   precautions maintained  Taken 11/5/2023 1600 by Felix Owens RN  Infection Prevention:   hand hygiene promoted   rest/sleep promoted  Isolation Precautions:   contact   spore   precautions maintained  Taken 11/5/2023 1405 by Felix Owens RN  Infection Prevention:   hand hygiene promoted   rest/sleep promoted  Isolation Precautions:   contact   spore   precautions maintained  Taken 11/5/2023 1205 by Felix Owens RN  Infection Prevention:   hand hygiene promoted   rest/sleep promoted  Isolation Precautions:   contact   spore   precautions maintained  Taken 11/5/2023 1001 by Felix Owens RN  Infection Prevention:   hand hygiene promoted   rest/sleep promoted  Isolation Precautions:   contact   spore   precautions maintained  Taken 11/5/2023 0800 by Felix Owens RN  Infection Prevention:   hand hygiene promoted   rest/sleep promoted  Isolation Precautions:   contact   spore   precautions maintained     Problem: Nutrition  Impaired (Sepsis/Septic Shock)  Goal: Optimal Nutrition Intake  Outcome: Ongoing, Progressing     Problem: Fall Injury Risk  Goal: Absence of Fall and Fall-Related Injury  Outcome: Ongoing, Progressing  Intervention: Identify and Manage Contributors  Recent Flowsheet Documentation  Taken 11/5/2023 1827 by Felix Owens RN  Medication Review/Management: medications reviewed  Taken 11/5/2023 1600 by Felix Owens RN  Medication Review/Management: medications reviewed  Taken 11/5/2023 1405 by Felix Owens RN  Medication Review/Management: medications reviewed  Taken 11/5/2023 1205 by Felix Owens RN  Medication Review/Management: medications reviewed  Taken 11/5/2023 1001 by Felix Owens RN  Medication Review/Management: medications reviewed  Taken 11/5/2023 0800 by Felix Owens RN  Medication Review/Management: medications reviewed  Intervention: Promote Injury-Free Environment  Recent Flowsheet Documentation  Taken 11/5/2023 1827 by Felix Owens RN  Safety Promotion/Fall Prevention:   activity supervised   assistive device/personal items within reach   clutter free environment maintained   toileting scheduled   safety round/check completed   room organization consistent   nonskid shoes/slippers when out of bed   fall prevention program maintained  Taken 11/5/2023 1600 by Felix Owens RN  Safety Promotion/Fall Prevention:   activity supervised   assistive device/personal items within reach   clutter free environment maintained   toileting scheduled   safety round/check completed   room organization consistent   nonskid shoes/slippers when out of bed   fall prevention program maintained  Taken 11/5/2023 1405 by Felix Owens RN  Safety Promotion/Fall Prevention:   activity supervised   assistive device/personal items within reach   clutter free environment maintained   toileting scheduled   safety round/check completed   room organization consistent   nonskid shoes/slippers when out of  bed   fall prevention program maintained  Taken 11/5/2023 1205 by Felix Owens RN  Safety Promotion/Fall Prevention:   activity supervised   assistive device/personal items within reach   clutter free environment maintained   toileting scheduled   safety round/check completed   room organization consistent   nonskid shoes/slippers when out of bed   fall prevention program maintained  Taken 11/5/2023 1001 by Felix Owens RN  Safety Promotion/Fall Prevention:   activity supervised   assistive device/personal items within reach   clutter free environment maintained   toileting scheduled   safety round/check completed   room organization consistent   nonskid shoes/slippers when out of bed   fall prevention program maintained  Taken 11/5/2023 0800 by Felix Owens RN  Safety Promotion/Fall Prevention:   activity supervised   assistive device/personal items within reach   clutter free environment maintained   toileting scheduled   safety round/check completed   room organization consistent   nonskid shoes/slippers when out of bed   fall prevention program maintained     Problem: Fluid and Electrolyte Imbalance (Acute Kidney Injury/Impairment)  Goal: Fluid and Electrolyte Balance  Outcome: Ongoing, Progressing     Problem: Oral Intake Inadequate (Acute Kidney Injury/Impairment)  Goal: Optimal Nutrition Intake  Outcome: Ongoing, Progressing     Problem: Renal Function Impairment (Acute Kidney Injury/Impairment)  Goal: Effective Renal Function  Outcome: Ongoing, Progressing  Intervention: Monitor and Support Renal Function  Recent Flowsheet Documentation  Taken 11/5/2023 1827 by Felix Owens RN  Medication Review/Management: medications reviewed  Taken 11/5/2023 1600 by Felix Owens RN  Medication Review/Management: medications reviewed  Taken 11/5/2023 1405 by Felix Owens RN  Medication Review/Management: medications reviewed  Taken 11/5/2023 1205 by Felix Owens RN  Medication Review/Management:  medications reviewed  Taken 11/5/2023 1001 by Felix Owens RN  Medication Review/Management: medications reviewed  Taken 11/5/2023 0800 by Felix Owens RN  Medication Review/Management: medications reviewed     Problem: Skin Injury Risk Increased  Goal: Skin Health and Integrity  Outcome: Ongoing, Progressing  Intervention: Optimize Skin Protection  Recent Flowsheet Documentation  Taken 11/5/2023 1827 by Felix Owens RN  Head of Bed (HOB) Positioning: HOB at 20-30 degrees  Taken 11/5/2023 1600 by Felix Owens RN  Head of Bed (HOB) Positioning: HOB at 20 degrees  Taken 11/5/2023 1405 by Felix Owens RN  Head of Bed (HOB) Positioning: HOB at 20-30 degrees  Taken 11/5/2023 1205 by Felix Owens RN  Head of Bed (HOB) Positioning: HOB at 30-45 degrees  Taken 11/5/2023 1001 by Felix Owens RN  Head of Bed (HOB) Positioning: HOB at 30 degrees  Taken 11/5/2023 0800 by Felix Owens RN  Head of Bed (HOB) Positioning: HOB at 30 degrees   Goal Outcome Evaluation:  Plan of Care Reviewed With: patient        Progress: no change

## 2023-11-06 NOTE — CONSULTS
Patient Name: Catherine Boyer  Age/Sex: 83 y.o. female  : 1940  MRN: [id number]    Date of Admission: 2023  Date of Encounter Visit: 23  Encounter Provider: Kevin Walters MD  Place of Service: [facility] CARDIOLOGY      Referring Provider: Jaimee Macias MD  Patient Care Team:  Kristi Moreau APRN as PCP - General (Nurse Practitioner)  Gerard Foreman MD as Referring Physician (Medical Oncology)  Darrell Reinoso MD as Consulting Physician (Hematology and Oncology)  Albni Barriga MD as Cardiologist (Cardiology)  Richard Aldana RN as     Subjective:     Admitted/Consulted for: Chest Pain    Chief Complaint:  Abdominal Pain, Nausea and Vomiting       History of Present Illness:  Catherine Boyer is a 83 y.o. female followed by Dr. Barriga with a past medical history notable for CAD s/p intervention to the LAD and diagonal in 2022.  In 2022, she presented with chest pain and was found to have in-stent restenosis of the diagonal and underwent repeat stenting.  She has a  of the RCA, hypertension, diabetes mellitus type 2, paroxysmal atrial fibrillation on Eliquis and myelofibrosis followed by hematology.  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    She has had recurrent problems with anemia requiring blood transfusions.  She was discharged on 10-2 after being admitted with dyspnea, acute on chronic heart failure and UTI.  She was seen in the office in follow-up on 10-18.  She was in sinus rhythm but was tachycardiac.  Metoprolol succinate was increased to 25 mg daily.      She presented to the ED on  with abdomina pain, nausea and vomiting. CT of the abdomin revealed no acute abdominal findings but did show a density of the lingula and was confirmed by CT of  the chest which may represent pneumonia or a neoplasm.  She is  being followed by Pulmonary and will undergo bronchoscopy and biopsy today .  Plavix and Eliquis have been held.       Last night she developed chest pain relieved with nitroglycerin.  HS troponin's  have been slight elevated and flat.  Hemoglobin this morning is 6.8 and she is due to get a unit of blood.        Previous testing:     ECHO 5-6-23    Left ventricular systolic function is low normal. Left ventricular ejection fraction appears to be 51 - 55%.    Left ventricular wall thickness is consistent with mild septal asymmetric hypertrophy.    The left atrial cavity is moderately dilated.    Mild aortic valve stenosis is present.    Peak velocity of the flow distal to the aortic valve is 202 cm/s. Aortic valve maximum pressure gradient is 16 mmHg. Aortic valve mean pressure gradient is 9 mmHg. Aortic valve dimensionless index is 0.8 .    Estimated right ventricular systolic pressure from tricuspid regurgitation is markedly elevated (>55 mmHg). Calculated right ventricular systolic pressure from tricuspid regurgitation is 62 mmHg.    Severe pulmonary hypertension is present.    Mild dilation of the ascending aorta is present. 3.7 cm.    Mild to moderate mitral regurgitation is noted.    STRESS TEST 9-2-22  Myocardial perfusion imaging indicates a large-sized infarct located in the anterior wall and apex with mild-to-moderate mirtha-infarct ischemia.  An additional medium-sized, mild-to-moderately severe area of ischemia located in the inferior wall was noted.  Left ventricular ejection fraction is severely reduced. (Calculated EF = 27%).  Impressions are consistent with a high risk study.  Spoke with Ludivina ZENG to relay information to physician.    Past Medical History:  Past Medical History:   Diagnosis Date    Atherosclerosis of abdominal aorta 02/05/2023    Atrial fibrillation     Breast cancer     CAD (coronary artery disease)     NSTEMI 2/2022: 90% ostial LAD, 99% D1, 70% mid-distal LAD (medical therapy). She  received two stents (2.5x18 and 2.5x26mm Mobile LEFTY) but I don't know which one went to which lesion.    Carotid atherosclerosis     Cholecystitis 11/22/2022    Added automatically from request for surgery [id number]    Chronic diastolic (congestive) heart failure     COVID 10/29/2022    GERD (gastroesophageal reflux disease)     Glaucoma     History of cataract     Hypertension     Microcytic anemia     per Dr. oleg pate office  note 6/30/22-dd    Myeloproliferative disorder     JAK2 positive    Nonbacterial thrombotic endocarditis     6/2021: 4x5mm vegetation on the ventricular surface of the anterior MV, negative blood cultures    Porcelain gallbladder     PUD (peptic ulcer disease)     TIA (transient ischemic attack)     Type 2 diabetes mellitus     Type 2 diabetes mellitus with circulatory disorder, without long-term current use of insulin 07/14/2022    Upper GI bleed        Past Surgical History:   Procedure Laterality Date    BREAST LUMPECTOMY  1999    CARDIAC CATHETERIZATION N/A 09/02/2022    Procedure: Coronary angiography;  Surgeon: Guero Verde MD;  Location: [facility] CATH INVASIVE LOCATION;  Service: Cardiovascular;  Laterality: N/A;    CARDIAC CATHETERIZATION N/A 09/02/2022    Procedure: Stent LEFTY coronary;  Surgeon: Guero Verde MD;  Location: [facility] CATH INVASIVE LOCATION;  Service: Cardiovascular;  Laterality: N/A;    CATARACT EXTRACTION  2011    CHOLECYSTECTOMY      CHOLECYSTECTOMY WITH INTRAOPERATIVE CHOLANGIOGRAM N/A 11/28/2022    Procedure: CHOLECYSTECTOMY LAPAROSCOPIC INTRAOPERATIVE CHOLANGIOGRAM;  Surgeon: Dani Grover Jr., MD;  Location: [facility] MAIN OR;  Service: General;  Laterality: N/A;    COLONOSCOPY N/A 02/09/2023    Procedure: COLONOSCOPY TO CECUM & T.I.;  Surgeon: Guero Ferris MD;  Location: [facility] ENDOSCOPY;  Service: Gastroenterology;  Laterality: N/A;  PRE- MELENA, GI BLEED  POST- DIVERTICULOSIS, INT HEMORRHOIDS    ENDOSCOPY N/A 01/25/2023    Procedure:  ESOPHAGOGASTRODUODENOSCOPY WITH BIOPSIES;  Surgeon: Charles Diaz MD;  Location: [facility] ENDOSCOPY;  Service: Gastroenterology;  Laterality: N/A;  pre: abd pain, nausea  post: hiatal hernia, mild gastritis, sloughing of the esophagus    ENTEROSCOPY SMALL BOWEL N/A 02/09/2023    Procedure: ENTEROSCOPY SMALL BOWEL;  Surgeon: Guero Ferris MD;  Location: [facility] ENDOSCOPY;  Service: Gastroenterology;  Laterality: N/A;  PRE- MELENA, GI BLEED  POST- HIATAL HERNIA    JOINT REPLACEMENT      UPPER GASTROINTESTINAL ENDOSCOPY         Home Medications:   Medications Prior to Admission   Medication Sig Dispense Refill Last Dose    acetaminophen (TYLENOL) 500 MG tablet Take 1 tablet by mouth As Needed.   11/1/2023    apixaban (ELIQUIS) 5 MG tablet tablet Take 1 tablet by mouth 2 (Two) Times a Day. 180 tablet 3 11/2/2023    clopidogrel (PLAVIX) 75 MG tablet TAKE 1 TABLET DAILY 30 tablet 11 11/2/2023    famotidine (PEPCID) 20 MG tablet Take 1 tablet by mouth Daily.   11/2/2023    furosemide (LASIX) 40 MG tablet TAKE 1 TABLET DAILY 90 tablet 3 11/2/2023    hydrocortisone-bacitracin-zinc oxide-nystatin (MAGIC BARRIER) Apply 1 application topically to the appropriate area as directed 2 (Two) Times a Day.   11/2/2023    latanoprost (XALATAN) 0.005 % ophthalmic solution Administer 1 drop to both eyes.   11/2/2023    metoprolol succinate XL (TOPROL-XL) 25 MG 24 hr tablet Take 1 tablet by mouth Daily. 90 tablet 2 11/2/2023    Momelotinib Dihydrochloride (OJJAARA) 200 MG tablet Take 1 tablet by mouth Daily. 28 tablet 3 11/2/2023    ondansetron (Zofran) 4 MG tablet Take 1 tablet by mouth Every 8 (Eight) Hours As Needed for Nausea or Vomiting. 90 tablet 0 Past Week    saccharomyces boulardii (FLORASTOR) 250 MG capsule Take 1 capsule by mouth 2 (Two) Times a Day. 60 capsule 0 11/2/2023    sertraline (ZOLOFT) 100 MG tablet Take 1 tablet by mouth Daily.   11/2/2023    simethicone (MYLICON) 80 MG chewable tablet Chew 1 tablet by mouth 4  "(Four) Times a Day As Needed for Flatulence. 100 tablet 0 Past Week    sucralfate (CARAFATE) 1 g tablet Take 1 tablet by mouth Every 12 (Twelve) Hours.   11/2/2023    loperamide (IMODIUM) 2 MG capsule Take 1 capsule by mouth 4 (Four) Times a Day As Needed for Diarrhea. 120 capsule 0     mupirocin (BACTROBAN) 2 % ointment APPLY TOPICALLY TWICE DAILY TO WOUND          Allergies:  Allergies   Allergen Reactions    Aspirin Unknown - Low Severity    Oxycodone Unknown - Low Severity    Hydroxyurea Other (See Comments)     Her hemoglobin drop and was stop in February 2022 and start taking Jakafi    Diphenhydramine Hcl Anxiety and Unknown (See Comments)     Benadryl IVP       Past Social History:  Social History     Socioeconomic History    Marital status:    Tobacco Use    Smoking status: Former     Packs/day: 1.00     Years: 20.00     Additional pack years: 0.00     Total pack years: 20.00     Types: Cigarettes     Passive exposure: Past    Smokeless tobacco: Never    Tobacco comments:     30  years ago   Vaping Use    Vaping Use: Never used   Substance and Sexual Activity    Alcohol use: Yes     Comment: \"rare\"    Drug use: Never    Sexual activity: Defer        Past Family History:  Family History   Problem Relation Age of Onset    Ovarian cancer Mother     Lung cancer Father     Lung cancer Sister     Malig Hyperthermia Neg Hx          Review of Systems:  All systems reviewed. Pertinent positives identified in HPI. All other systems are negative.       Objective:     Objective:  Temp:  [97.5 °F (36.4 °C)-98.8 °F (37.1 °C)] 97.5 °F (36.4 °C)  Heart Rate:  [] 105  Resp:  [18] 18  BP: (102-124)/(35-67) 124/58    Intake/Output Summary (Last 24 hours) at 11/6/2023 0934  Last data filed at 11/5/2023 2110  Gross per 24 hour   Intake --   Output 600 ml   Net -600 ml     Body mass index is 25.76 kg/m².      11/04/23  0539 11/05/23  0507 11/06/23  0605   Weight: 62.6 kg (138 lb) 62.6 kg (138 lb) 67.6 kg (149 lb 0.5 " oz)           Physical Exam:   Temp:  [97.5 °F (36.4 °C)-98.8 °F (37.1 °C)] 97.5 °F (36.4 °C)  Heart Rate:  [] 105  Resp:  [18] 18  BP: (102-124)/(35-67) 124/58    Intake/Output Summary (Last 24 hours) at 11/6/2023 0934  Last data filed at 11/5/2023 2110  Gross per 24 hour   Intake --   Output 600 ml   Net -600 ml     Flowsheet Rows      Flowsheet Row First Filed Value   Admission Height --   Admission Weight 63 kg (138 lb 14.2 oz) Documented at 11/03/2023 0556            General Appearance:    Alert, cooperative, in no acute distress   Head:    Normocephalic, without obvious abnormality, atraumatic       Neck/Lymph   No adenopathy, supple, no thyromegaly, no carotid bruit, no    JVD   Lungs:     Clear to auscultation bilaterally, no wheezes, rales, or     rhonchi    Cardiac:    Normal rate, regular rhythm, no murmur, no rub, no gallop   Chest Wall:    No abnormalities observed   GI:     Normal bowel sounds, soft, nontender, nondistended,            no rebound tenderness   Extremities:   No cyanosis, clubbing, or edema   Circulatory/Peripheral Vascular :   Pulses palpable and equal bilaterally   Integumentary:   No bleeding or rash. Normal temperature                Lab Review:     Results from last 7 days   Lab Units 11/06/23  0611 11/05/23 0905 11/05/23  0124   SODIUM mmol/L 138 139 137   POTASSIUM mmol/L 3.5 3.8 3.9   CHLORIDE mmol/L 109* 108* 105   CO2 mmol/L 20.9* 21.4* 22.0   BUN mg/dL 16 26* 33*   CREATININE mg/dL 0.94 1.34* 1.56*   GLUCOSE mg/dL 95 112* 170*   CALCIUM mg/dL 8.1* 8.3* 7.9*       Results from last 7 days   Lab Units 11/06/23  0611 11/05/23 2019 11/05/23  1105   HSTROP T ng/L 37* 33* 31*     Results from last 7 days   Lab Units 11/06/23  0611   WBC 10*3/mm3 41.43*   HEMOGLOBIN g/dL 6.8*   HEMATOCRIT % 21.0*   PLATELETS 10*3/mm3 271             Results from last 7 days   Lab Units 11/05/23  0124   MAGNESIUM mg/dL 1.9                       Imaging:    Imaging Results (Most Recent)        Procedure Component Value Units Date/Time    CT Chest Without Contrast Diagnostic [[id number]] Collected: 11/04/23 0909     Updated: 11/04/23 0920    Narrative:      CT CHEST WO CONTRAST DIAGNOSTIC-     INDICATIONS: Lung mass     TECHNIQUE: Radiation dose reduction techniques were utilized, including  automated exposure control and exposure modulation based on body size.  Unenhanced CHEST CT     COMPARISON: 5/7/2023     FINDINGS:           The heart size is borderline without pericardial effusion. Prominent  coronary arterial calcifications are apparent. Ascending aorta is  dilated, 3.8 cm, not significantly changed. A few small subcentimeter  short axis mediastinal lymph nodes are seen that are not significant by  size criteria. Assessment of vascular, mediastinal, and hilar structures  is limited without intravenous contrast material. Debris is seen in the  esophagus.     The airways appear clear.     No pleural effusion or pneumothorax.     The lungs show again show a pleural-based density in the lingula, axial  image 58, also present the abdomen/pelvis CT from 11/2/2023 (and appears  similar apart from increased extension medially), but representing a  change from the chest CT from 5/7/2023, may be rounded atelectasis,  pneumonia, or neoplasm. Since the recent abdomen/pelvis CT,  consolidation has developed anteriorly laterally in the left lower lobe  suspicious for pneumonia. Fairly extensive groundglass and consolidative  opacities are present throughout both lungs, especially mid to upper  lungs that may represent edema and/or atypical pneumonia.        Upper abdominal structures show no acute findings. Gallbladder is  surgically absent. 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 Mild  nonspecific thickening of the adrenal glands.     Degenerative changes are seen in the spine, shoulders. No acute fracture  is identified.       Impression:         Redemonstration of pleural-based density in the lingula,  showing  increased extension medially, representing a change from the chest CT  from 5/7/2023, may be rounded atelectasis, pneumonia, or neoplasm;  correlate clinically, PET/CT correlation can be obtained as indicated,  in addition to continued CT follow-up. Since the recent abdomen/pelvis  CT, consolidation has developed anteriorly laterally in the left lower  lobe suspicious for pneumonia. Fairly extensive groundglass and  consolidative opacities are present throughout both lungs, especially  mid to upper lungs that may represent edema and/or atypical pneumonia           This report was finalized on 11/4/2023 9:17 AM by Dr. Reilly Ritter M.D on Workstation: [email or web address]       CT Abdomen Pelvis Without Contrast [[id number]] Collected: 11/02/23 1912     Updated: 11/02/23 1925    Narrative:      CT ABDOMEN PELVIS WO CONTRAST-     INDICATIONS: Abdominal pain     TECHNIQUE: Radiation dose reduction techniques were utilized, including  automated exposure control and exposure modulation based on body size.  Unenhanced ABDOMEN AND PELVIS CT     COMPARISON: 8/13/2023     FINDINGS:     The gallbladder is surgically absent.     Mild nonspecific nodular thickening of the adrenal glands is seen.     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 A small arterial calcification is  apparent at the left renal hilum. No hydronephrosis.     Otherwise unremarkable unenhanced appearance of the liver, spleen,  adrenal glands, pancreas, kidneys, bladder.     No bowel obstruction or abnormal bowel thickening is identified. Colonic  diverticula are seen that do not appear inflamed. Minimal hiatal hernia  is seen. Pelvic floor relaxation/rectocele is apparent, correlate with  clinical exam.     No free intraperitoneal gas or free fluid. Umbilical hernia of fat is  noted.     Scattered small mesenteric and para-aortic lymph nodes are seen that are  not significant by size criteria.     Abdominal aorta is not aneurysmal. Aortic and other  arterial  calcifications are present.     The lung bases show a new pleural-based density in the lingula, 1.3 x  3.3 cm on axial image 8, that could be rounded atelectasis or  potentially a focus of infection or even neoplasm, correlate clinically,  PET/CT correlation can be obtained as indicated, in addition to  continued CT follow-up. Reticulonodular opacities in the lower lobes may  be inflammatory/infectious in etiology, follow-up recommended.     Degenerative changes are seen in the spine. Stable sclerotic foci in the  left aspect of the sacrum, right femur, nonspecific, may represent bone  island. No acute fracture is identified.             Impression:            1. A new indeterminate density in the lingula, as described.  Reticulonodular pulmonary opacities in the bilateral lower lobes.  Follow-up evaluation advised.     2. Colonic diverticulosis. No acute inflammatory process of bowel is  identified. Follow-up as indications persist.     3. No urolithiasis or hydronephrosis. Small arterial calcification of  the left renal hilum.     This report was finalized on 11/2/2023 7:22 PM by Dr. Reilly Ritter M.D on Workstation: [id number]               Results for orders placed during the hospital encounter of 05/03/23    Adult Transthoracic Echo Complete W/ Cont if Necessary Per Protocol    Interpretation Summary    Left ventricular systolic function is low normal. Left ventricular ejection fraction appears to be 51 - 55%.    Left ventricular wall thickness is consistent with mild septal asymmetric hypertrophy.    The left atrial cavity is moderately dilated.    Mild aortic valve stenosis is present.    Peak velocity of the flow distal to the aortic valve is 202 cm/s. Aortic valve maximum pressure gradient is 16 mmHg. Aortic valve mean pressure gradient is 9 mmHg. Aortic valve dimensionless index is 0.8 .    Estimated right ventricular systolic pressure from tricuspid regurgitation is markedly elevated (>55  mmHg). Calculated right ventricular systolic pressure from tricuspid regurgitation is 62 mmHg.    Severe pulmonary hypertension is present.    Mild dilation of the ascending aorta is present. 3.7 cm.    Mild to moderate mitral regurgitation is noted.      EK-5-23        BASELINE:   10-18-23    I personally viewed and interpreted the patient's EKG/Telemetry data.    Assessment:   Assessment & Plan   1.  Chest pain  2.  Myeloproliferative disorder  3.  Paroxysmal atrial fibrillation  4.  Severe anemia: Hematocrit 21 today.  5.  History of TIA  6.  Severe pulmonary hypertension  7.  Non-ST elevation MI: Type II    -Patient with intermittent chest discomfort in the setting of severe anemia.  Receiving packed cells today.  -No indication to repeat transthoracic echocardiogram.  She has a history of mild aortic stenosis and severe pulmonary hypertension on echo in May 2023  -We will continue to follow    Thank you for allowing me to participate in the care of Catherine Boyer. Feel free to contact me directly with any further questions or concerns.    Kevin Walters MD  [facility]  23  09:34 EST

## 2023-11-06 NOTE — CONSULTS
" responded to both Advance Care planning consult and referral from nurse. Upon arrival, pt's daughters stated pt had been waiting to discuss Advance Directive. During introduction to what is covered in AD document, pt stated, \"I'm not ready to have this conversation.\" Pastoral Care office will be available to try again if requested.  "

## 2023-11-06 NOTE — PROGRESS NOTES
Nutrition Services    Patient Name:  Catherine Boyer  YOB: 1940  MRN: [id number]  Admit Date:  11/2/2023    Assessment Date:  11/06/23    Summary: Nutrition assessment triggered by chart skin report.  Admitted with N/V, malaise x 3 days with abdominal cramping and diarrhea.  Poor PO intake.  KARLI.  Hgb decreased.  Diarrhea resolving.  S/p bronchoscopy and lung biopsy today.    Noted to have pressure injuries - L 4th toe stage II and L leg stage II.      Sleeping at time of attempted visit.  Adding Boost GC daily to help promote wound healing.     RD to continue to follow.    CLINICAL NUTRITION ASSESSMENT      Reason for Assessment Pressure Injury and/or Non-Healing Wound     Diagnosis/Problem   KARLI   Medical/Surgical History Past Medical History:   Diagnosis Date    Atherosclerosis of abdominal aorta 02/05/2023    Atrial fibrillation     Breast cancer     CAD (coronary artery disease)     NSTEMI 2/2022: 90% ostial LAD, 99% D1, 70% mid-distal LAD (medical therapy). She received two stents (2.5x18 and 2.5x26mm Finesse LEFTY) but I don't know which one went to which lesion.    Carotid atherosclerosis     Cholecystitis 11/22/2022    Added automatically from request for surgery [id number]    Chronic diastolic (congestive) heart failure     COVID 10/29/2022    GERD (gastroesophageal reflux disease)     Glaucoma     History of cataract     Hypertension     Microcytic anemia     per Dr. oleg pate office  note 6/30/22-dd    Myeloproliferative disorder     JAK2 positive    Nonbacterial thrombotic endocarditis     6/2021: 4x5mm vegetation on the ventricular surface of the anterior MV, negative blood cultures    Porcelain gallbladder     PUD (peptic ulcer disease)     TIA (transient ischemic attack)     Type 2 diabetes mellitus     Type 2 diabetes mellitus with circulatory disorder, without long-term current use of insulin 07/14/2022    Upper GI bleed        Past Surgical History:   Procedure Laterality Date    BREAST  LUMPECTOMY  1999    CARDIAC CATHETERIZATION N/A 09/02/2022    Procedure: Coronary angiography;  Surgeon: Guero Verde MD;  Location: [facility] CATH INVASIVE LOCATION;  Service: Cardiovascular;  Laterality: N/A;    CARDIAC CATHETERIZATION N/A 09/02/2022    Procedure: Stent LEFTY coronary;  Surgeon: Guero Verde MD;  Location: [facility] CATH INVASIVE LOCATION;  Service: Cardiovascular;  Laterality: N/A;    CATARACT EXTRACTION  2011    CHOLECYSTECTOMY      CHOLECYSTECTOMY WITH INTRAOPERATIVE CHOLANGIOGRAM N/A 11/28/2022    Procedure: CHOLECYSTECTOMY LAPAROSCOPIC INTRAOPERATIVE CHOLANGIOGRAM;  Surgeon: Dani Grover Jr., MD;  Location: [facility] MAIN OR;  Service: General;  Laterality: N/A;    COLONOSCOPY N/A 02/09/2023    Procedure: COLONOSCOPY TO CECUM & T.I.;  Surgeon: Guero Ferris MD;  Location: [facility] ENDOSCOPY;  Service: Gastroenterology;  Laterality: N/A;  PRE- MELENA, GI BLEED  POST- DIVERTICULOSIS, INT HEMORRHOIDS    ENDOSCOPY N/A 01/25/2023    Procedure: ESOPHAGOGASTRODUODENOSCOPY WITH BIOPSIES;  Surgeon: Charles Diaz MD;  Location: [facility] ENDOSCOPY;  Service: Gastroenterology;  Laterality: N/A;  pre: abd pain, nausea  post: hiatal hernia, mild gastritis, sloughing of the esophagus    ENTEROSCOPY SMALL BOWEL N/A 02/09/2023    Procedure: ENTEROSCOPY SMALL BOWEL;  Surgeon: Guero Ferris MD;  Location: [facility] ENDOSCOPY;  Service: Gastroenterology;  Laterality: N/A;  PRE- MELENA, GI BLEED  POST- HIATAL HERNIA    JOINT REPLACEMENT      UPPER GASTROINTESTINAL ENDOSCOPY          Anthropometrics        Current Height  Current Weight  BMI kg/m2    Weight: 67.6 kg (149 lb 0.5 oz) (11/06/23 0605)  Body mass index is 25.76 kg/m².   Adjusted BMI (if applicable)    BMI Category Overweight (25 - 29.9)   Ideal Body Weight (IBW) 119 lb (54.2 kg)   Usual Body Weight (UBW) Unsure   Weight Trend Other: fluctuations, possible gain noted   Weight History Wt Readings from Last 30 Encounters:   11/06/23 0605 67.6 kg (149  lb 0.5 oz)   11/05/23 0507 62.6 kg (138 lb)   11/04/23 0539 62.6 kg (138 lb)   11/03/23 0556 63 kg (138 lb 14.2 oz)   10/18/23 1601 67.7 kg (149 lb 4.8 oz)   10/18/23 1307 66.1 kg (145 lb 12.8 oz)   10/11/23 1225 64.4 kg (142 lb)   10/02/23 0618 64.6 kg (142 lb 6.7 oz)   10/01/23 0525 62.8 kg (138 lb 7.2 oz)   09/30/23 0429 65.3 kg (143 lb 15.4 oz)   09/29/23 0500 66 kg (145 lb 9.6 oz)   09/28/23 0445 66.1 kg (145 lb 11.6 oz)   09/27/23 1219 68.6 kg (151 lb 3.2 oz)   09/19/23 1559 68.7 kg (151 lb 8 oz)   08/28/23 1556 66.7 kg (147 lb)   08/13/23 1508 67.1 kg (147 lb 14.4 oz)   08/13/23 1044 65.8 kg (145 lb)   08/07/23 1456 67.6 kg (149 lb)   08/02/23 0913 67.1 kg (148 lb)   06/26/23 1430 66 kg (145 lb 6.4 oz)   05/16/23 0509 60.8 kg (134 lb 0.6 oz)   05/15/23 0427 60.6 kg (133 lb 9.6 oz)   05/14/23 0537 64.1 kg (141 lb 5 oz)   05/13/23 0532 62.4 kg (137 lb 9.1 oz)   05/11/23 0500 60.6 kg (133 lb 9.6 oz)   05/10/23 0500 62 kg (136 lb 11 oz)   05/09/23 0500 64.6 kg (142 lb 6.7 oz)   05/07/23 0546 63.8 kg (140 lb 10.5 oz)   05/06/23 1050 63 kg (138 lb 14.2 oz)   05/06/23 0438 63 kg (138 lb 14.2 oz)   05/05/23 0715 63.4 kg (139 lb 12.4 oz)   05/04/23 0542 61.2 kg (134 lb 14.7 oz)   05/03/23 1540 53.5 kg (118 lb)   04/10/23 1305 65.8 kg (145 lb 1.6 oz)   03/21/23 1549 64.7 kg (142 lb 9.6 oz)   02/24/23 1309 65.2 kg (143 lb 12.8 oz)   02/15/23 1446 65.3 kg (144 lb)   02/04/23 1422 66.7 kg (147 lb)   01/23/23 0829 69.5 kg (153 lb 3.5 oz)   01/23/23 0151 68 kg (150 lb)   12/20/22 1339 69 kg (152 lb 3.2 oz)   12/19/22 1620 69.5 kg (153 lb 3.2 oz)   12/19/22 1625 69.5 kg (153 lb 3.2 oz)   11/28/22 1126 70 kg (154 lb 5.2 oz)   11/21/22 0918 70 kg (154 lb 5.2 oz)   11/21/22 0259 68 kg (150 lb)   11/21/22 0024 68 kg (150 lb)   11/18/22 0900 68.5 kg (151 lb)   11/08/22 1520 68.9 kg (151 lb 12.8 oz)   10/30/22 0801 68.9 kg (152 lb)   10/26/22 0834 70.8 kg (156 lb 1.6 oz)   10/07/22 0749 71.2 kg (157 lb)   09/27/22 1640 72.7 kg  (160 lb 3.2 oz)   09/13/22 1639 71.4 kg (157 lb 4.8 oz)      --  Estimated/Assessed Needs        Current Weight  Weight: 67.6 kg (149 lb 0.5 oz) (11/06/23 0605)       Energy Requirements    Weight for Calculation 149 lb (67.6 kg)   Method for Estimation  30-35 kcal/kg   EST Needs (kcal/day) 3843-3228       Protein Requirements    Weight for Calculation 149 lb (67.6 kg)   EST Protein Needs (g/kg) 1.2 - 1.5 gm/kg   EST Daily Needs (g/day)        Fluid Requirements     Method for Estimation 1 mL/kcal    EST Needs (mL/day)      Labs       Pertinent Labs    Results from last 7 days   Lab Units 11/06/23  0611 11/05/23 0905 11/05/23 0124 11/03/23  0540 11/02/23  1631   SODIUM mmol/L 138 139 137   < > 132*   POTASSIUM mmol/L 3.5 3.8 3.9   < > 4.2   CHLORIDE mmol/L 109* 108* 105   < > 98   CO2 mmol/L 20.9* 21.4* 22.0   < > 20.0*   BUN mg/dL 16 26* 33*   < > 55*   CREATININE mg/dL 0.94 1.34* 1.56*   < > 2.96*   CALCIUM mg/dL 8.1* 8.3* 7.9*   < > 8.5*   BILIRUBIN mg/dL  --   --   --   --  0.5   ALK PHOS U/L  --   --   --   --  115   ALT (SGPT) U/L  --   --   --   --  17   AST (SGOT) U/L  --   --   --   --  22   GLUCOSE mg/dL 95 112* 170*   < > 148*    < > = values in this interval not displayed.     Results from last 7 days   Lab Units 11/06/23  0611 11/05/23 0905 11/05/23  0124   MAGNESIUM mg/dL  --   --  1.9   PHOSPHORUS mg/dL 1.7*   < >  --    HEMOGLOBIN g/dL 6.8*  --   --    HEMATOCRIT % 21.0*  --   --    WBC 10*3/mm3 41.43*  --   --    ALBUMIN g/dL 3.2*   < >  --     < > = values in this interval not displayed.     Results from last 7 days   Lab Units 11/06/23  0611 11/03/23  0540 11/02/23  1631   PLATELETS 10*3/mm3 271 413 522*     COVID19   Date Value Ref Range Status   08/13/2023 Not Detected Not Detected - Ref. Range Final     Lab Results   Component Value Date    HGBA1C 6.60 (H) 09/29/2023          Medications           Scheduled Medications ertapenem, 1,000 mg, Intravenous, Q24H  famotidine, 20 mg, Oral,  Daily  latanoprost, 1 drop, Both Eyes, Nightly  metoprolol succinate XL, 25 mg, Oral, Daily  Momelotinib Dihydrochloride, 200 mg, Oral, Daily  potassium phosphate, 15 mmol, Intravenous, Once  saccharomyces boulardii, 250 mg, Oral, BID  senna-docusate sodium, 2 tablet, Oral, BID  sertraline, 100 mg, Oral, Daily       Infusions     PRN Medications   acetaminophen    senna-docusate sodium **AND** polyethylene glycol **AND** bisacodyl **AND** bisacodyl    HYDROcodone-acetaminophen    melatonin    Morphine    nitroglycerin    ondansetron **OR** ondansetron    Phosphorus Replacement - Follow Nurse / BPA Driven Protocol    simethicone    Insert Peripheral IV **AND** sodium chloride     Physical Findings          General Findings alert, oriented, overweight, on oxygen therapy   Oral/Mouth Cavity tooth or teeth missing   Edema  no edema   Gastrointestinal abdominal pain, diarrhea, fecal incontinence, nausea, non-distended , vomiting, last bowel movement: 11/4   Skin  bruising, excoriation, MASD, pressure injury: st II L leg, st II L 4th toe, location of wound: L medial plantar wound, bilateral abdomen wound   Tubes/Drains/Lines none   NFPE Not indicated at this time   --  Current Nutrition Orders & Evaluation of Intake       Oral Nutrition     Food Allergies NKFA   Current PO Diet Diet: Cardiac Diets; Healthy Heart (2-3 Na+); Texture: Regular Texture (IDDSI 7); Fluid Consistency: Thin (IDDSI 0)   Supplement n/a   PO Evaluation     % PO Intake No intake available     Factors Affecting Intake: diarrhea, nausea, vomiting   --  PES STATEMENT / NUTRITION DIAGNOSIS      Nutrition Dx Problem  Problem: Increased Nutrient Needs  Etiology: Medical Diagnosis - pressure injuries    Signs/Symptoms: Report/Observation     NUTRITION INTERVENTION / PLAN OF CARE      Intervention Goal(s) Maintain nutrition status, Reduce/improve symptoms, Meet estimated needs, Disease management/therapy, Establish PO intake, Tolerate PO , and No significant  weight loss         RD Intervention/Action Continue to monitor, Care plan reviewed, and Recommend/order: ONS   --      Prescription/Orders:       PO Diet       Supplements Boost GC daily      Enteral Nutrition       Parenteral Nutrition    New Prescription Ordered? Yes   --      Monitor/Evaluation Per protocol, PO intake, Supplement intake, Pertinent labs, Skin status, Symptoms   Discharge Plan/Needs Pending clinical course   --    RD to follow per protocol.      Electronically signed by:  Naty Lozada RD  11/06/23 13:58 EST

## 2023-11-07 ENCOUNTER — ANESTHESIA EVENT (OUTPATIENT)
Dept: GASTROENTEROLOGY | Facility: HOSPITAL | Age: 83
End: 2023-11-07
Payer: MEDICARE

## 2023-11-07 ENCOUNTER — ANESTHESIA (OUTPATIENT)
Dept: GASTROENTEROLOGY | Facility: HOSPITAL | Age: 83
End: 2023-11-07
Payer: MEDICARE

## 2023-11-07 LAB
ALBUMIN SERPL-MCNC: 3.5 G/DL (ref 3.5–5.2)
ANION GAP SERPL CALCULATED.3IONS-SCNC: 7.4 MMOL/L (ref 5–15)
ANISOCYTOSIS BLD QL: ABNORMAL
BH BB BLOOD EXPIRATION DATE: NORMAL
BH BB BLOOD TYPE BARCODE: 5100
BH BB DISPENSE STATUS: NORMAL
BH BB PRODUCT CODE: NORMAL
BH BB UNIT NUMBER: NORMAL
BUN SERPL-MCNC: 11 MG/DL (ref 8–23)
BUN/CREAT SERPL: 12.5 (ref 7–25)
CALCIUM SPEC-SCNC: 8.3 MG/DL (ref 8.6–10.5)
CHLORIDE SERPL-SCNC: 107 MMOL/L (ref 98–107)
CO2 SERPL-SCNC: 23.6 MMOL/L (ref 22–29)
CREAT SERPL-MCNC: 0.88 MG/DL (ref 0.57–1)
CROSSMATCH INTERPRETATION: NORMAL
DEPRECATED RDW RBC AUTO: 55.4 FL (ref 37–54)
EGFRCR SERPLBLD CKD-EPI 2021: 64.5 ML/MIN/1.73
ERYTHROCYTE [DISTWIDTH] IN BLOOD BY AUTOMATED COUNT: 16.8 % (ref 12.3–15.4)
GLUCOSE SERPL-MCNC: 97 MG/DL (ref 65–99)
HCT VFR BLD AUTO: 24.2 % (ref 34–46.6)
HGB BLD-MCNC: 7.7 G/DL (ref 12–15.9)
HYPOCHROMIA BLD QL: ABNORMAL
LYMPHOCYTES # BLD MANUAL: 1.43 10*3/MM3 (ref 0.7–3.1)
LYMPHOCYTES NFR BLD MANUAL: 6.1 % (ref 5–12)
MCH RBC QN AUTO: 29.2 PG (ref 26.6–33)
MCHC RBC AUTO-ENTMCNC: 31.8 G/DL (ref 31.5–35.7)
MCV RBC AUTO: 91.7 FL (ref 79–97)
MONOCYTES # BLD: 2.18 10*3/MM3 (ref 0.1–0.9)
NEUTROPHILS # BLD AUTO: 32.09 10*3/MM3 (ref 1.7–7)
NEUTROPHILS NFR BLD MANUAL: 89.9 % (ref 42.7–76)
PHOSPHATE SERPL-MCNC: 1.8 MG/DL (ref 2.5–4.5)
PHOSPHATE SERPL-MCNC: 2.2 MG/DL (ref 2.5–4.5)
PLAT MORPH BLD: NORMAL
PLATELET # BLD AUTO: 278 10*3/MM3 (ref 140–450)
PMV BLD AUTO: 10.6 FL (ref 6–12)
POLYCHROMASIA BLD QL SMEAR: ABNORMAL
POTASSIUM SERPL-SCNC: 3.5 MMOL/L (ref 3.5–5.2)
RBC # BLD AUTO: 2.64 10*6/MM3 (ref 3.77–5.28)
SODIUM SERPL-SCNC: 138 MMOL/L (ref 136–145)
UNIT  ABO: NORMAL
UNIT  RH: NORMAL
VARIANT LYMPHS NFR BLD MANUAL: 4 % (ref 19.6–45.3)
WBC MORPH BLD: NORMAL
WBC NRBC COR # BLD: 35.7 10*3/MM3 (ref 3.4–10.8)

## 2023-11-07 PROCEDURE — 36430 TRANSFUSION BLD/BLD COMPNT: CPT

## 2023-11-07 PROCEDURE — 99232 SBSQ HOSP IP/OBS MODERATE 35: CPT

## 2023-11-07 PROCEDURE — 85025 COMPLETE CBC W/AUTO DIFF WBC: CPT | Performed by: INTERNAL MEDICINE

## 2023-11-07 PROCEDURE — 85007 BL SMEAR W/DIFF WBC COUNT: CPT | Performed by: INTERNAL MEDICINE

## 2023-11-07 PROCEDURE — 84100 ASSAY OF PHOSPHORUS: CPT | Performed by: INTERNAL MEDICINE

## 2023-11-07 PROCEDURE — P9016 RBC LEUKOCYTES REDUCED: HCPCS

## 2023-11-07 PROCEDURE — 99232 SBSQ HOSP IP/OBS MODERATE 35: CPT | Performed by: NURSE PRACTITIONER

## 2023-11-07 PROCEDURE — 80069 RENAL FUNCTION PANEL: CPT | Performed by: INTERNAL MEDICINE

## 2023-11-07 PROCEDURE — 25810000003 SODIUM CHLORIDE 0.9 % SOLUTION: Performed by: INTERNAL MEDICINE

## 2023-11-07 PROCEDURE — 25010000002 ERTAPENEM PER 500 MG: Performed by: INTERNAL MEDICINE

## 2023-11-07 PROCEDURE — 86900 BLOOD TYPING SEROLOGIC ABO: CPT

## 2023-11-07 RX ORDER — FENTANYL/ROPIVACAINE/NS/PF 2-625MCG/1
15 PLASTIC BAG, INJECTION (ML) EPIDURAL ONCE
Status: COMPLETED | OUTPATIENT
Start: 2023-11-07 | End: 2023-11-07

## 2023-11-07 RX ADMIN — LATANOPROST 1 DROP: 50 SOLUTION/ DROPS OPHTHALMIC at 21:00

## 2023-11-07 RX ADMIN — Medication 3 MG: at 21:00

## 2023-11-07 RX ADMIN — POTASSIUM PHOSPHATE, MONOBASIC POTASSIUM PHOSPHATE, DIBASIC 15 MMOL: 224; 236 INJECTION, SOLUTION, CONCENTRATE INTRAVENOUS at 12:08

## 2023-11-07 RX ADMIN — Medication 250 MG: at 21:00

## 2023-11-07 RX ADMIN — ERTAPENEM SODIUM 1000 MG: 1 INJECTION, POWDER, LYOPHILIZED, FOR SOLUTION INTRAMUSCULAR; INTRAVENOUS at 11:25

## 2023-11-07 RX ADMIN — POTASSIUM PHOSPHATE, MONOBASIC POTASSIUM PHOSPHATE, DIBASIC 15 MMOL: 224; 236 INJECTION, SOLUTION, CONCENTRATE INTRAVENOUS at 20:59

## 2023-11-07 NOTE — SIGNIFICANT NOTE
11/07/23 1451   OTHER   Discipline physical therapist   Rehab Time/Intention   Session Not Performed other (see comments)  (Pt politely declined PT today, reports not feeling up to it. PT will follow up tomorrow)   Recommendation   PT - Next Appointment 11/08/23

## 2023-11-07 NOTE — PROGRESS NOTES
LOS: 5 days     Chief Complaint/ Reason for encounter: KARLI    Subjective   11/06/23 : Patient resting with family at bedside able provide some history  She is doing well overall no new complaints  Weight up today but she is not edematous, not short of breath  She is on 3 L oxygen which is near her home rate  Eating some, drinking well according to family    11/7: No new complaints, bronchoscopy with biopsy scheduled for today  Oral intake remains very low, no shortness of breath chest pain or edema    Medical history reviewed:  History of Present Illness    Subjective    History taken from: Patient and chart    Vital Signs  Temp:  [97.7 °F (36.5 °C)-98.8 °F (37.1 °C)] 98.5 °F (36.9 °C)  Heart Rate:  [] 96  Resp:  [16] 16  BP: ()/(44-60) 125/50       Wt Readings from Last 1 Encounters:   11/07/23 0600 67.6 kg (149 lb 0.5 oz)   11/06/23 0605 67.6 kg (149 lb 0.5 oz)   11/05/23 0507 62.6 kg (138 lb)   11/04/23 0539 62.6 kg (138 lb)   11/03/23 0556 63 kg (138 lb 14.2 oz)       Objective:  Vital signs: (most recent): Blood pressure 125/50, pulse 96, temperature 98.5 °F (36.9 °C), temperature source Oral, resp. rate 16, weight 67.6 kg (149 lb 0.5 oz), SpO2 98%.                Objective:  General Appearance:  Comfortable, well-appearing, in no acute distress and not in pain.  Awake, alert, oriented  HEENT: Mucous membranes moist, no injury, oropharynx clear  Lungs:  Normal effort and normal respiratory rate.  Breath sounds clear to auscultation.  No  respiratory distress.  No rales, decreased breath sounds or rhonchi.    Heart: Normal rate.  Regular rhythm.  S1, S2 normal.  No murmur.   Abdomen: Abdomen is soft.  Bowel sounds are normal, no abdominal tenderness.  There is no rebound or guarding  Extremities: Trace edema of bilateral lower extremities  Neurological: No focal motor or sensory deficits, pupils reactive  Skin:  Warm and dry.  No rash or cyanosis.       Results Review:    Intake/Output:      Intake/Output Summary (Last 24 hours) at 11/7/2023 1242  Last data filed at 11/7/2023 1103  Gross per 24 hour   Intake 308.33 ml   Output 1300 ml   Net -991.67 ml         DATA:  Radiology and Labs:  The following labs independently reviewed by me. Additional labs ordered for tomorrow a.m.  Interval notes, chart personally reviewed by me.   Old records independently reviewed showing normal baseline creatinine, history of CHF  Discussed with patient's family at bedside    Risk/ complexity of medical care/ medical decision making moderate risk, KARLI, fluid and electrolyte management    Labs:   Recent Results (from the past 24 hour(s))   Hemoglobin & Hematocrit, Blood    Collection Time: 11/06/23  9:00 PM    Specimen: Blood   Result Value Ref Range    Hemoglobin 8.1 (L) 12.0 - 15.9 g/dL    Hematocrit 25.5 (L) 34.0 - 46.6 %   CBC Auto Differential    Collection Time: 11/07/23  5:06 AM    Specimen: Blood   Result Value Ref Range    WBC 35.70 (C) 3.40 - 10.80 10*3/mm3    RBC 2.64 (L) 3.77 - 5.28 10*6/mm3    Hemoglobin 7.7 (L) 12.0 - 15.9 g/dL    Hematocrit 24.2 (L) 34.0 - 46.6 %    MCV 91.7 79.0 - 97.0 fL    MCH 29.2 26.6 - 33.0 pg    MCHC 31.8 31.5 - 35.7 g/dL    RDW 16.8 (H) 12.3 - 15.4 %    RDW-SD 55.4 (H) 37.0 - 54.0 fl    MPV 10.6 6.0 - 12.0 fL    Platelets 278 140 - 450 10*3/mm3   Renal Function Panel    Collection Time: 11/07/23  5:06 AM    Specimen: Blood   Result Value Ref Range    Glucose 97 65 - 99 mg/dL    BUN 11 8 - 23 mg/dL    Creatinine 0.88 0.57 - 1.00 mg/dL    Sodium 138 136 - 145 mmol/L    Potassium 3.5 3.5 - 5.2 mmol/L    Chloride 107 98 - 107 mmol/L    CO2 23.6 22.0 - 29.0 mmol/L    Calcium 8.3 (L) 8.6 - 10.5 mg/dL    Albumin 3.5 3.5 - 5.2 g/dL    Phosphorus 1.8 (C) 2.5 - 4.5 mg/dL    Anion Gap 7.4 5.0 - 15.0 mmol/L    BUN/Creatinine Ratio 12.5 7.0 - 25.0    eGFR 64.5 >60.0 mL/min/1.73   Manual Differential    Collection Time: 11/07/23  5:06 AM    Specimen: Blood   Result Value Ref Range     Neutrophil % 89.9 (H) 42.7 - 76.0 %    Lymphocyte % 4.0 (L) 19.6 - 45.3 %    Monocyte % 6.1 5.0 - 12.0 %    Neutrophils Absolute 32.09 (H) 1.70 - 7.00 10*3/mm3    Lymphocytes Absolute 1.43 0.70 - 3.10 10*3/mm3    Monocytes Absolute 2.18 (H) 0.10 - 0.90 10*3/mm3    Anisocytosis Slight/1+ None Seen    Hypochromia Slight/1+ None Seen    Polychromasia Mod/2+ None Seen    WBC Morphology Normal Normal    Platelet Morphology Normal Normal   Prepare RBC, 1 Units    Collection Time: 11/07/23  6:00 AM   Result Value Ref Range    Product Code D0138S32     Unit Number [id number]     UNIT  ABO O     UNIT  RH POS     Crossmatch Interpretation Compatible     Dispense Status PT     Blood Expiration Date 202311292359     Blood Type Barcode 5100    Prepare RBC, 1 Units    Collection Time: 11/07/23 11:21 AM   Result Value Ref Range    Product Code N4898Q02     Unit Number [id number]     UNIT  ABO O     UNIT  RH POS     Crossmatch Interpretation Compatible     Dispense Status IS     Blood Expiration Date 202311162359     Blood Type Barcode 5100        Radiology:  Pertinent radiology studies were reviewed as described above      Medications have been reviewed separately in chart overview      ASSESSMENT:  Acute kidney injury, prerenal, rapidly improving and near baseline  Chronic diastolic CHF  Nausea vomiting and dehydration, better  Left lingular lung mass  Myeloproliferative disorder  Chronic leukocytosis  Atrial fibrillation  Diarrhea, slowly resolving  Acute on chronic anemia  Diabetes mellitus  UTI on ertapenem, ESBL E. coli  Lung mass, biopsy planned       DISCUSSION/PLAN:   Renal function continues to improve and is near baseline today  Oral intake not high enough to warrant restarting her diuretics at this time  Currently n.p.o. for bronchoscopy/biopsy  We will plan to restart low-dose Lasix in the next few days depending on oral intake and volume status     Continue to monitor electrolytes and volume  closely      Darrell Hanley MD  [facility]   Office phone number: [phone]  Answering service phone number: [phone]    11/07/23  12:42 EST    Dictation performed using Dragon dictation [employer]

## 2023-11-07 NOTE — PLAN OF CARE
Problem: Adult Inpatient Plan of Care  Goal: Plan of Care Review  Outcome: Ongoing, Progressing  Goal: Patient-Specific Goal (Individualized)  Outcome: Ongoing, Progressing  Goal: Absence of Hospital-Acquired Illness or Injury  Outcome: Ongoing, Progressing  Intervention: Identify and Manage Fall Risk  Recent Flowsheet Documentation  Taken 11/7/2023 1400 by Ismael Lowery RN  Safety Promotion/Fall Prevention:   safety round/check completed   room organization consistent  Taken 11/7/2023 1200 by Ismael Lowery RN  Safety Promotion/Fall Prevention:   room organization consistent   safety round/check completed  Taken 11/7/2023 1000 by Ismael Lowery RN  Safety Promotion/Fall Prevention:   safety round/check completed   room organization consistent  Taken 11/7/2023 0800 by Ismael Lowery RN  Safety Promotion/Fall Prevention:   safety round/check completed   room organization consistent  Intervention: Prevent Skin Injury  Recent Flowsheet Documentation  Taken 11/7/2023 1400 by Ismael Lowery RN  Body Position: position changed independently  Taken 11/7/2023 0800 by Ismael Lowery RN  Body Position: position changed independently  Skin Protection: adhesive use limited  Intervention: Prevent and Manage VTE (Venous Thromboembolism) Risk  Recent Flowsheet Documentation  Taken 11/7/2023 1400 by Ismael Lowery RN  Activity Management: activity encouraged  Taken 11/7/2023 0800 by Ismael Lowery RN  Activity Management: activity encouraged  Intervention: Prevent Infection  Recent Flowsheet Documentation  Taken 11/7/2023 1400 by Ismael Lowery RN  Infection Prevention:   single patient room provided   rest/sleep promoted  Taken 11/7/2023 1200 by Ismael Lowery RN  Infection Prevention:   single patient room provided   rest/sleep promoted  Taken 11/7/2023 1000 by Ismael Lowery RN  Infection Prevention:   single patient room provided   rest/sleep promoted  Taken 11/7/2023 0800 by Ismael Lowery RN  Infection  Prevention:   single patient room provided   rest/sleep promoted  Goal: Optimal Comfort and Wellbeing  Outcome: Ongoing, Progressing  Intervention: Provide Person-Centered Care  Recent Flowsheet Documentation  Taken 11/7/2023 0800 by Ismael Lowery RN  Trust Relationship/Rapport:   care explained   questions encouraged  Goal: Readiness for Transition of Care  Outcome: Ongoing, Progressing     Problem: Adjustment to Illness (Sepsis/Septic Shock)  Goal: Optimal Coping  Outcome: Ongoing, Progressing  Intervention: Optimize Psychosocial Adjustment to Illness  Recent Flowsheet Documentation  Taken 11/7/2023 0800 by Ismael Lowery RN  Family/Support System Care: self-care encouraged     Problem: Bleeding (Sepsis/Septic Shock)  Goal: Absence of Bleeding  Outcome: Ongoing, Progressing     Problem: Glycemic Control Impaired (Sepsis/Septic Shock)  Goal: Blood Glucose Level Within Desired Range  Outcome: Ongoing, Progressing     Problem: Infection Progression (Sepsis/Septic Shock)  Goal: Absence of Infection Signs and Symptoms  Outcome: Ongoing, Progressing  Intervention: Initiate Sepsis Management  Recent Flowsheet Documentation  Taken 11/7/2023 1400 by Ismael Lowery RN  Infection Prevention:   single patient room provided   rest/sleep promoted  Isolation Precautions:   precautions maintained   enhanced contact  Taken 11/7/2023 1200 by Ismael Lowery RN  Infection Prevention:   single patient room provided   rest/sleep promoted  Taken 11/7/2023 1000 by Ismael Lowery RN  Infection Prevention:   single patient room provided   rest/sleep promoted  Isolation Precautions:   precautions maintained   enhanced contact  Taken 11/7/2023 0800 by Ismael Lowery RN  Infection Prevention:   single patient room provided   rest/sleep promoted  Intervention: Promote Recovery  Recent Flowsheet Documentation  Taken 11/7/2023 1400 by Ismael Lowery RN  Activity Management: activity encouraged  Taken 11/7/2023 0800 by Ismael Lowery  RN  Activity Management: activity encouraged     Problem: Nutrition Impaired (Sepsis/Septic Shock)  Goal: Optimal Nutrition Intake  Outcome: Ongoing, Progressing     Problem: Fall Injury Risk  Goal: Absence of Fall and Fall-Related Injury  Outcome: Ongoing, Progressing  Intervention: Identify and Manage Contributors  Recent Flowsheet Documentation  Taken 11/7/2023 1400 by Ismael Lowery RN  Medication Review/Management: medications reviewed  Taken 11/7/2023 1200 by Ismael Lowery RN  Medication Review/Management: medications reviewed  Taken 11/7/2023 1000 by Ismael Lowery RN  Medication Review/Management: medications reviewed  Taken 11/7/2023 0800 by Ismael Lowery RN  Medication Review/Management: medications reviewed  Intervention: Promote Injury-Free Environment  Recent Flowsheet Documentation  Taken 11/7/2023 1400 by Imsael Lowery RN  Safety Promotion/Fall Prevention:   safety round/check completed   room organization consistent  Taken 11/7/2023 1200 by Ismael Lowery RN  Safety Promotion/Fall Prevention:   room organization consistent   safety round/check completed  Taken 11/7/2023 1000 by Ismael Lowery RN  Safety Promotion/Fall Prevention:   safety round/check completed   room organization consistent  Taken 11/7/2023 0800 by Ismael Lowery RN  Safety Promotion/Fall Prevention:   safety round/check completed   room organization consistent     Problem: Fluid and Electrolyte Imbalance (Acute Kidney Injury/Impairment)  Goal: Fluid and Electrolyte Balance  Outcome: Ongoing, Progressing     Problem: Oral Intake Inadequate (Acute Kidney Injury/Impairment)  Goal: Optimal Nutrition Intake  Outcome: Ongoing, Progressing     Problem: Renal Function Impairment (Acute Kidney Injury/Impairment)  Goal: Effective Renal Function  Outcome: Ongoing, Progressing  Intervention: Monitor and Support Renal Function  Recent Flowsheet Documentation  Taken 11/7/2023 1400 by Ismael Lowery RN  Medication Review/Management:  medications reviewed  Taken 11/7/2023 1200 by Ismael Lowery RN  Medication Review/Management: medications reviewed  Taken 11/7/2023 1000 by Ismael Lowery RN  Medication Review/Management: medications reviewed  Taken 11/7/2023 0800 by Ismael Lowery RN  Medication Review/Management: medications reviewed     Problem: Skin Injury Risk Increased  Goal: Skin Health and Integrity  Outcome: Ongoing, Progressing  Intervention: Optimize Skin Protection  Recent Flowsheet Documentation  Taken 11/7/2023 1400 by Ismael Lowery RN  Head of Bed (HOB) Positioning: HOB at 30 degrees  Taken 11/7/2023 0800 by Ismael Lowery RN  Head of Bed (HOB) Positioning: HOB at 20-30 degrees  Skin Protection: adhesive use limited   Goal Outcome Evaluation:

## 2023-11-07 NOTE — PLAN OF CARE
Goal Outcome Evaluation:  Plan of Care Reviewed With: patient met lying in bed, there was no complain of chest pain throughout the shift, she has been npo since midnight awaiting bronchoscopy today. Stat H&H was done, Hgb is currently 8.6.

## 2023-11-07 NOTE — PROGRESS NOTES
HOSPITAL FOLLOW UP NOTE    Patient Name: Catherine Boyer  Patient : 1940        Date of Service:23  Provider of Service: ABEL Dolan  Place of Service: [facility]  Referral Provider: Jaimee Macias, *          Follow Up:  chest pain    Interval Hx: VSS, BP improved post transfusion, no further chest pain      OBJECTIVE  Temp:  [97.7 °F (36.5 °C)-98.8 °F (37.1 °C)] 98.8 °F (37.1 °C)  Heart Rate:  [] 92  Resp:  [16] 16  BP: ()/(44-60) 102/60     Intake/Output Summary (Last 24 hours) at 2023 0859  Last data filed at 2023 1700  Gross per 24 hour   Intake 308.33 ml   Output 650 ml   Net -341.67 ml     Body mass index is 25.76 kg/m².      23  0507 23  0605 23  0600   Weight: 62.6 kg (138 lb) 67.6 kg (149 lb 0.5 oz) 67.6 kg (149 lb 0.5 oz)         Physical Exam:     General Appearance:    Alert, cooperative, in no acute distress, resting   Head:    Normocephalic, without obvious abnormality, atraumatic   Eyes:            Conjunctivae and sclerae normal, no   icterus, no pallor, corneas clear, PERRLA   Neck:   No adenopathy, supple, trachea midline, no thyromegaly, no   carotid bruit, no JVD   Lungs:     Coarse latesha bases, respirations regular, even and unlabored, on 3.5L NaO2    Heart:    Regular rhythm and normal rate, normal S1 and S2, no murmur, no gallop, no rub, no click   Chest Wall:    No abnormalities observed   Abdomen:     Normal bowel sounds, no masses, no organomegaly, soft, nontender, nondistended, no guarding, no rebound  tenderness   Extremities:   Moves all extremities well, no edema, no cyanosis, no redness   Pulses:   Pulses palpable and equal bilaterally.          CURRENT MEDS    Scheduled Meds:ertapenem, 1,000 mg, Intravenous, Q24H  famotidine, 20 mg, Oral, Daily  latanoprost, 1 drop, Both Eyes, Nightly  metoprolol succinate XL, 25 mg, Oral, Daily  Momelotinib Dihydrochloride, 200 mg, Oral, Daily  potassium  phosphate, 15 mmol, Intravenous, Once  saccharomyces boulardii, 250 mg, Oral, BID  senna-docusate sodium, 2 tablet, Oral, BID  sertraline, 100 mg, Oral, Daily      Continuous Infusions:       Lab Review:   Results from last 7 days   Lab Units 11/07/23  0506 11/06/23  0611 11/03/23  0540 11/02/23  1631   SODIUM mmol/L 138 138   < > 132*   POTASSIUM mmol/L 3.5 3.5   < > 4.2   CHLORIDE mmol/L 107 109*   < > 98   CO2 mmol/L 23.6 20.9*   < > 20.0*   BUN mg/dL 11 16   < > 55*   CREATININE mg/dL 0.88 0.94   < > 2.96*   GLUCOSE mg/dL 97 95   < > 148*   CALCIUM mg/dL 8.3* 8.1*   < > 8.5*   AST (SGOT) U/L  --   --   --  22   ALT (SGPT) U/L  --   --   --  17    < > = values in this interval not displayed.         Results from last 7 days   Lab Units 11/07/23  0506 11/06/23  2100 11/06/23  0611   WBC 10*3/mm3 35.70*  --  41.43*   HEMOGLOBIN g/dL 7.7* 8.1* 6.8*   HEMATOCRIT % 24.2* 25.5* 21.0*   PLATELETS 10*3/mm3 278  --  271         Results from last 7 days   Lab Units 11/05/23  0124   MAGNESIUM mg/dL 1.9     2D Echocardiogram 05/06/2023:    Left ventricular systolic function is low normal. Left ventricular ejection fraction appears to be 51 - 55%.    Left ventricular wall thickness is consistent with mild septal asymmetric hypertrophy.    The left atrial cavity is moderately dilated.    Mild aortic valve stenosis is present.    Peak velocity of the flow distal to the aortic valve is 202 cm/s. Aortic valve maximum pressure gradient is 16 mmHg. Aortic valve mean pressure gradient is 9 mmHg. Aortic valve dimensionless index is 0.8 .    Estimated right ventricular systolic pressure from tricuspid regurgitation is markedly elevated (>55 mmHg). Calculated right ventricular systolic pressure from tricuspid regurgitation is 62 mmHg.    Severe pulmonary hypertension is present.    Mild dilation of the ascending aorta is present. 3.7 cm.    Mild to moderate mitral regurgitation is noted.    ASSESSMENT & PLAN    Acute kidney injury     CAD (coronary artery disease)    Paroxysmal atrial fibrillation    Myeloproliferative disorder    Type 2 diabetes mellitus with circulatory disorder, without long-term current use of insulin    Hypertension    Personal history of transient ischemic attack (TIA), 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 deficits    Myelofibrosis    Pulmonary infiltrate    1.  Chest pain: In setting of severe anemia.  No further chest pain/ discomfort  2.  Myeloproliferative disorder  3.  Paroxysmal atrial fibrillation: SR with PACs on tele.  On beta blocker.  Eliquis on hold at this time  4.  Severe anemia: Hb yesterday 6.8.  Received 1 unit PRBCs-> 8.1-> 7.7 this am.  Eliquis and Plavix being held  5.  Coronary artery disease: Status post PCI to LAD and diagonal in February 2022, status post repeat stenting to diagonal secondary to in-stent restenosis September 2022, known  of RCA.    6.  Severe pulmonary hypertension : RVSP 62mm Hg, mild TR  7.  NSTEMI: Type II secondary to supply demand mismatch as evidenced by severe anemia  8.  Bilateral pulmonary infiltrates: bronchoscopy and lung bx planned for today  9.  Valvular disease: mild aortic stenosis, mild to moderate mitral regurgitation,, mild tricuspid regurgitation      Sherry Olmedo, APRN  11/07/23

## 2023-11-07 NOTE — PROGRESS NOTES
Dr. DELL Alford    [address]        Patient ID:  Name:  Catherine Boyer  MRN:  [id number]  1940  83 y.o.  female            CC/Reason for visit: Left lung opacity, bilateral pulmonary infiltrates, myelodysplastic syndrome     Interval hx: Patient again had hemoglobin below 8 this morning.  Dr. Auguste was not comfortable performing monitored anesthesia care for bronchoscopy due to her underlying health issues.  The case was canceled.  She is receiving another unit of PRBC transfusion.  I discussed case with Dr. Meeks.    ROS: Denies palpitations or abdominal pain    Vitals:  Vitals:    11/07/23 0600 11/07/23 0725 11/07/23 1114 11/07/23 1138   BP:  102/60 109/53 125/50   BP Location:  Right arm     Patient Position:  Lying     Pulse:  92 90 96   Resp:  16 16 16   Temp:  98.8 °F (37.1 °C) 98.4 °F (36.9 °C) 98.5 °F (36.9 °C)   TempSrc:  Oral Oral Oral   SpO2:  99% 99% 98%   Weight: 67.6 kg (149 lb 0.5 oz)              Body mass index is 25.76 kg/m².    Intake/Output Summary (Last 24 hours) at 11/7/2023 1257  Last data filed at 11/7/2023 1103  Gross per 24 hour   Intake 308.33 ml   Output 1300 ml   Net -991.67 ml       Exam:  GEN:               No distress  Alert, oriented x 2, very hard of hearing.   LUNGS:           Few scattered rhonchi bilat, no use of accessory muscles  CV:                  Normal S1S2, without murmur, no edema  ABD:                Non tender, no enlarged liver or masses      Scheduled meds:  ertapenem, 1,000 mg, Intravenous, Q24H  famotidine, 20 mg, Oral, Daily  latanoprost, 1 drop, Both Eyes, Nightly  metoprolol succinate XL, 25 mg, Oral, Daily  Momelotinib Dihydrochloride, 200 mg, Oral, Daily  potassium phosphate, 15 mmol, Intravenous, Once  saccharomyces boulardii, 250 mg, Oral, BID  senna-docusate sodium, 2 tablet, Oral, BID  sertraline, 100 mg, Oral, Daily      IV meds:                           Data Review:   I reviewed the patient's medications and new clinical  results.            Results from last 7 days   Lab Units 11/07/23  0506 11/06/23  2100 11/06/23  0611 11/05/23  0905 11/05/23  0124 11/04/23  0626 11/03/23  0540 11/02/23  1631   SODIUM mmol/L 138  --  138 139   < > 139 135* 132*   POTASSIUM mmol/L 3.5  --  3.5 3.8   < > 3.7 3.9 4.2   CHLORIDE mmol/L 107  --  109* 108*   < > 106 101 98   CO2 mmol/L 23.6  --  20.9* 21.4*   < > 19.0* 20.2* 20.0*   BUN mg/dL 11  --  16 26*   < > 42* 53* 55*   CREATININE mg/dL 0.88  --  0.94 1.34*   < > 1.79* 2.44* 2.96*   CALCIUM mg/dL 8.3*  --  8.1* 8.3*   < > 8.2* 8.4* 8.5*   BILIRUBIN mg/dL  --   --   --   --   --   --   --  0.5   ALK PHOS U/L  --   --   --   --   --   --   --  115   ALT (SGPT) U/L  --   --   --   --   --   --   --  17   AST (SGOT) U/L  --   --   --   --   --   --   --  22   GLUCOSE mg/dL 97  --  95 112*   < > 112* 93 148*   WBC 10*3/mm3 35.70*  --  41.43*  --   --   --  39.23* 44.77*   HEMOGLOBIN g/dL 7.7* 8.1* 6.8*  --   --   --  7.6* 8.1*   PLATELETS 10*3/mm3 278  --  271  --   --   --  413 522*   PROCALCITONIN ng/mL  --   --   --   --   --  0.09  --   --     < > = values in this interval not displayed.     Results from last 7 days   Lab Units 11/04/23  1603 11/03/23  1603   BLOODCX  No growth at 2 days  No growth at 2 days  --    URINECX   --  >100,000 CFU/mL Escherichia coli ESBL*         ASSESSMENT:   Bilateral pulmonary infiltrates  Lingula pleural-based opacity  Acute kidney injury  CAD (coronary artery disease)  Paroxysmal atrial fibrillation  Myeloproliferative disorder  Type 2 diabetes mellitus with circulatory disorder, without long-term current use of insulin  Hypertension   Personal history of transient ischemic attack (TIA), 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 deficits  Hypoxemia      PLAN:  Her hemoglobin was again below 8 this morning.  I discussed the case with Dr. Auguste, anesthesiologist.  He did not feel comfortable performing the anesthesia due to her low hemoglobin and chronic  underlying health issues.  The case was postponed again today due to anemia and we are planning on bronchoscopy with biopsies tomorrow.  I discussed with Dr. Meeks.  She will receive 1 more unit of packed cells transfusion today.  Remain off of antiplatelets and anticoagulants for now.        Raoul Alford MD  11/7/2023

## 2023-11-07 NOTE — ANESTHESIA PREPROCEDURE EVALUATION
Anesthesia Evaluation     Nursing notes reviewed   NPO Solid Status: > 8 hours  NPO Liquid Status: > 2 hours           Airway   Dental      Pulmonary    (+) a smoker Former,  (-) COPD, asthma, lung cancer, no home oxygen    ROS comment: Bilateral pulmonary infiltrates, Lingula pleural-based opacity.    Cardiovascular   Exercise tolerance: poor (<4 METS)    NYHA Classification: II  PT is on anticoagulation therapy    (+) hypertension well controlled, valvular problems/murmurs AS, past MI  1-7 days, CAD, cardiac stents Drug eluting stent more than 12 months ago , dysrhythmias Atrial Fib, CHF Diastolic >=55%,  carotid artery disease    ROS comment:  Per Cards note known hx/o coronary artery disease, status post PCI to LAD and diagonal in February 2022, status post repeat stenting to diagonal secondary to in-stent restenosis September 2022, known  of RCA.  Severe pulmonary hypertension : RVSP 62mm Hg, mild TR.  Currently pt w/ Non-ST elevation MI: Type II secondary to supply demand mismatch as evidenced by severe anemia.    ECHO 5-6-23  ·  Left ventricular systolic function is low normal. Left ventricular ejection fraction appears to be 51 - 55%.  ·  Left ventricular wall thickness is consistent with mild septal asymmetric hypertrophy.  ·  The left atrial cavity is moderately dilated.  ·  Mild aortic valve stenosis is present.  ·  Peak velocity of the flow distal to the aortic valve is 202 cm/s. Aortic valve maximum pressure gradient is 16 mmHg. Aortic valve mean pressure gradient is 9 mmHg. Aortic valve dimensionless index is 0.8 .  ·  Estimated right ventricular systolic pressure from tricuspid regurgitation is markedly elevated (>55 mmHg). Calculated right ventricular systolic pressure from tricuspid regurgitation is 62 mmHg.  ·  Severe pulmonary hypertension is present.  ·  Mild dilation of the ascending aorta is present. 3.7 cm.  ·  Mild to moderate mitral regurgitation is noted.     STRESS TEST  9-2-22  ·Myocardial perfusion imaging indicates a large-sized infarct located in the anterior wall and apex with mild-to-moderate mirtha-infarct ischemia.  ·An additional medium-sized, mild-to-moderately severe area of ischemia located in the inferior wall was noted.  ·Left ventricular ejection fraction is severely reduced. (Calculated EF = 27%).  ·Impressions are consistent with a high risk study.      Neuro/Psych  (+) TIA, CVA  (-) seizures  GI/Hepatic/Renal/Endo    (+) GERD well controlled, PUD, GI bleeding , renal disease-, diabetes mellitus type 2 well controlled    ROS Comment: KARLI w/ this admission.    Musculoskeletal     Abdominal    Substance History      OB/GYN          Other   blood dyscrasia anemia,   history of cancer active      Other Comment: Myeloproliferative disorder    Pt profoundly anemic, 11/6 6.8 and 11/7 7.7.  Her procedure was subsequently cancelled in pre op both days.                Anesthesia Plan    ASA 4     MAC     (This pt has been cancelled 11/6 and 11/7 due to Cards notation of Non-ST elevation MI: Type II with intermittent chest discomfort in the setting of severe anemia.  Pt is s/p PRBC's 11/6 raising Hgb from 6.8 to 7.7.  In the face of her complex medical history this was felt to be inadequate for her to undergo an anesthetic at this time. Dr Alford is notified of this prior to pt's arrival in [facility].      He is told that pt need to be further optimized, requiring additional tx to further stabilize her at this time.  She is cancelled for today 11/7, and put on the schedule for tomorrow morning in hopes she is more stable for a procedure.  )      CODE STATUS:    Code Status (Patient has no pulse and is not breathing): CPR (Attempt to Resuscitate)  Medical Interventions (Patient has pulse or is breathing): Full

## 2023-11-07 NOTE — PROGRESS NOTES
[facility]  Inpatient Progress Note    Reason for Follow Up: Anemia    Subjective     Interval History:   Patient to go for bronchoscopy today.  Hemoglobin 7.7 this morning from 6.8 yesterday status post 1 unit PRBC last night.   Patient is sleeping when I arrived to the room.  She reports that she is feeling okay today.  Denies any abdominal pain, nausea, vomiting.  She believes that her diarrhea is improving.  Spoke to her daughters who are at bedside and they report that she did have 2 or 3 bowel movements last night.      Current Facility-Administered Medications:     acetaminophen (TYLENOL) tablet 650 mg, 650 mg, Oral, Q4H PRN, Jaimee Macias MD    sennosides-docusate (PERICOLACE) 8.6-50 MG per tablet 2 tablet, 2 tablet, Oral, BID **AND** polyethylene glycol (MIRALAX) packet 17 g, 17 g, Oral, Daily PRN **AND** bisacodyl (DULCOLAX) EC tablet 5 mg, 5 mg, Oral, Daily PRN **AND** bisacodyl (DULCOLAX) suppository 10 mg, 10 mg, Rectal, Daily PRN, Jaimee Macias MD    ertapenem (INVanz) 1,000 mg in sodium chloride 0.9 % 100 mL IVPB-VTB, 1,000 mg, Intravenous, Q24H, Kenton Meeks MD, Last Rate: 200 mL/hr at 11/06/23 1017, 1,000 mg at 11/06/23 1017    famotidine (PEPCID) tablet 20 mg, 20 mg, Oral, Daily, Jaimee Macias MD, 20 mg at 11/06/23 1017    HYDROcodone-acetaminophen (NORCO) 5-325 MG per tablet 1 tablet, 1 tablet, Oral, Q6H PRN, Junior Doan MD, 1 tablet at 11/04/23 1932    latanoprost (XALATAN) 0.005 % ophthalmic solution 1 drop, 1 drop, Both Eyes, Nightly, Jaimee Macias MD, 1 drop at 11/06/23 2119    melatonin tablet 3 mg, 3 mg, Oral, Nightly PRN, Jaimee Macias MD, 3 mg at 11/06/23 2118    metoprolol succinate XL (TOPROL-XL) 24 hr tablet 25 mg, 25 mg, Oral, Daily, Jaimee Macias MD, 25 mg at 11/06/23 1017    Momelotinib Dihydrochloride (OJJAARA) 200 mg, 200 mg, Oral, Daily, Jaimee Macias MD, 200 mg at 11/06/23 1021     morphine injection 2 mg, 2 mg, Intravenous, Q3H PRN, Dexter Reynolds, APRN    nitroglycerin (NITROSTAT) SL tablet 0.4 mg, 0.4 mg, Sublingual, Q5 Min PRN, Jennie Faye MD, 0.4 mg at 11/05/23 2157    ondansetron (ZOFRAN) tablet 4 mg, 4 mg, Oral, Q6H PRN **OR** ondansetron (ZOFRAN) injection 4 mg, 4 mg, Intravenous, Q6H PRN, Jaimee Macias MD, 4 mg at 11/06/23 1156    Phosphorus Replacement - Follow Nurse / BPA Driven Protocol, , Does not apply, PRN, Junior Doan MD    potassium phosphate 15 mmol in 0.9% normal saline 250 mL IVPB, 15 mmol, Intravenous, Once, Kenton Meeks MD    saccharomyces boulardii (FLORASTOR) capsule 250 mg, 250 mg, Oral, BID, Jaimee Macias MD, 250 mg at 11/06/23 2119    sertraline (ZOLOFT) tablet 100 mg, 100 mg, Oral, Daily, Jaimee Macias MD, 100 mg at 11/06/23 1017    simethicone (MYLICON) chewable tablet 80 mg, 80 mg, Oral, 4x Daily PRN, Dexter Reynolds APRN, 80 mg at 11/06/23 1157    Insert Peripheral IV, , , Once **AND** sodium chloride 0.9 % flush 10 mL, 10 mL, Intravenous, PRN, Tommy Clemente MD      Objective     Vital Signs  Temp:  [97.7 °F (36.5 °C)-98.4 °F (36.9 °C)] 97.7 °F (36.5 °C)  Heart Rate:  [] 102  Resp:  [16] 16  BP: ()/(44-54) 120/51  Body mass index is 25.76 kg/m².    Intake/Output Summary (Last 24 hours) at 11/7/2023 0722  Last data filed at 11/6/2023 1700  Gross per 24 hour   Intake 308.33 ml   Output 650 ml   Net -341.67 ml     No intake/output data recorded.     Physical Exam:     General: patient awake, alert and cooperative   Eyes: Normal lids and lashes, no scleral icterus   Neck: supple, normal ROM   Skin: warm and dry, not jaundiced   Cardiovascular: regular rhythm and rate   Pulm: regular and unlabored   Abdomen: soft, nontender, nondistended; normal bowel sounds     Results Review:     I reviewed the patient's new clinical results.    Results from last 7 days   Lab Units 11/07/23  0506 11/06/23  2100  11/06/23  0611 11/03/23  0540   WBC 10*3/mm3 35.70*  --  41.43* 39.23*   HEMOGLOBIN g/dL 7.7* 8.1* 6.8* 7.6*   HEMATOCRIT % 24.2* 25.5* 21.0* 24.1*   PLATELETS 10*3/mm3 278  --  271 413     Results from last 7 days   Lab Units 11/07/23  0506 11/06/23  0611 11/05/23  0905 11/03/23  0540 11/02/23  1631   SODIUM mmol/L 138 138 139   < > 132*   POTASSIUM mmol/L 3.5 3.5 3.8   < > 4.2   CHLORIDE mmol/L 107 109* 108*   < > 98   CO2 mmol/L 23.6 20.9* 21.4*   < > 20.0*   BUN mg/dL 11 16 26*   < > 55*   CREATININE mg/dL 0.88 0.94 1.34*   < > 2.96*   CALCIUM mg/dL 8.3* 8.1* 8.3*   < > 8.5*   BILIRUBIN mg/dL  --   --   --   --  0.5   ALK PHOS U/L  --   --   --   --  115   ALT (SGPT) U/L  --   --   --   --  17   AST (SGOT) U/L  --   --   --   --  22   GLUCOSE mg/dL 97 95 112*   < > 148*    < > = values in this interval not displayed.         Lab Results   Lab Value Date/Time    LIPASE 34 11/02/2023 1631    LIPASE 17 08/13/2023 1023    LIPASE 16 05/03/2023 1534    LIPASE 14 02/17/2023 1130    LIPASE 10 (L) 02/04/2023 1419    LIPASE 13 01/23/2023 0216    LIPASE 20 11/21/2022 0250    LIPASE 38 08/31/2022 2131    LIPASE 16.0 02/09/2022 0525    LIPASE 50 06/29/2021 1131       Radiology:  CT Chest Without Contrast Diagnostic   Final Result       Redemonstration of pleural-based density in the lingula, showing   increased extension medially, representing a change from the chest CT   from 5/7/2023, may be rounded atelectasis, pneumonia, or neoplasm;   correlate clinically, PET/CT correlation can be obtained as indicated,   in addition to continued CT follow-up. Since the recent abdomen/pelvis   CT, consolidation has developed anteriorly laterally in the left lower   lobe suspicious for pneumonia. Fairly extensive groundglass and   consolidative opacities are present throughout both lungs, especially   mid to upper lungs that may represent edema and/or atypical pneumonia               This report was finalized on 11/4/2023 9:17 AM by   Reilly Ritter M.D on Workstation: [id number]          CT Abdomen Pelvis Without Contrast   Final Result           1. A new indeterminate density in the lingula, as described.   Reticulonodular pulmonary opacities in the bilateral lower lobes.   Follow-up evaluation advised.       2. Colonic diverticulosis. No acute inflammatory process of bowel is   identified. Follow-up as indications persist.       3. No urolithiasis or hydronephrosis. Small arterial calcification of   the left renal hilum.       This report was finalized on 11/2/2023 7:22 PM by Dr. Reilly Ritter M.D on Workstation: [id number]              Assessment & Plan     Active Hospital Problems    Diagnosis     **Acute kidney injury     Pulmonary infiltrate     Myelofibrosis     Personal history of transient ischemic attack (TIA), 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 deficits     Hypertension     Type 2 diabetes mellitus with circulatory disorder, without long-term current use of insulin     Paroxysmal atrial fibrillation     CAD (coronary artery disease)     Myeloproliferative disorder        Assessment:  Diarrhea  Acute on chronic anemia  Myeloproliferative disorder  A-fib on Eliquis  Status post cholecystectomy  Status post TIA    Plan:  Supportive care per primary with IVF and antibiotics  Continue famotidine 20 mg daily  Continue to monitor H&H and transfuse as needed per primary to maintain hemoglobin greater than 7. Can consider endoscopic evaluation if patient develops evidence of overt GI bleeding.   Nursing staff to notify GI service on call if any change in clinical status  Recommend outpatient Konsyl fiber supplementation and outpatient follow up with primary gastroenterologist for consideration of pelvic floor evaluation vs anorectal manometry testing.     Future plans depending on clinical course and per attending, Dr. Guero Garibay PA-C

## 2023-11-08 ENCOUNTER — APPOINTMENT (OUTPATIENT)
Dept: GENERAL RADIOLOGY | Facility: HOSPITAL | Age: 83
End: 2023-11-08
Payer: MEDICARE

## 2023-11-08 ENCOUNTER — APPOINTMENT (OUTPATIENT)
Dept: CT IMAGING | Facility: HOSPITAL | Age: 83
End: 2023-11-08
Payer: MEDICARE

## 2023-11-08 LAB
ALBUMIN SERPL-MCNC: 3.5 G/DL (ref 3.5–5.2)
ANION GAP SERPL CALCULATED.3IONS-SCNC: 9.4 MMOL/L (ref 5–15)
B PARAPERT DNA SPEC QL NAA+PROBE: NOT DETECTED
B PERT DNA SPEC QL NAA+PROBE: NOT DETECTED
BH BB BLOOD EXPIRATION DATE: NORMAL
BH BB BLOOD TYPE BARCODE: 5100
BH BB DISPENSE STATUS: NORMAL
BH BB PRODUCT CODE: NORMAL
BH BB UNIT NUMBER: NORMAL
BUN SERPL-MCNC: 7 MG/DL (ref 8–23)
BUN/CREAT SERPL: 9.7 (ref 7–25)
C PNEUM DNA NPH QL NAA+NON-PROBE: NOT DETECTED
CALCIUM SPEC-SCNC: 8.3 MG/DL (ref 8.6–10.5)
CHLORIDE SERPL-SCNC: 104 MMOL/L (ref 98–107)
CO2 SERPL-SCNC: 24.6 MMOL/L (ref 22–29)
CREAT SERPL-MCNC: 0.72 MG/DL (ref 0.57–1)
CROSSMATCH INTERPRETATION: NORMAL
DEPRECATED RDW RBC AUTO: 52.2 FL (ref 37–54)
EGFRCR SERPLBLD CKD-EPI 2021: 82.1 ML/MIN/1.73
EOSINOPHIL # BLD MANUAL: 0.93 10*3/MM3 (ref 0–0.4)
EOSINOPHIL NFR BLD MANUAL: 2 % (ref 0.3–6.2)
ERYTHROCYTE [DISTWIDTH] IN BLOOD BY AUTOMATED COUNT: 16.3 % (ref 12.3–15.4)
FLUAV SUBTYP SPEC NAA+PROBE: NOT DETECTED
FLUBV RNA ISLT QL NAA+PROBE: NOT DETECTED
GIE STN SPEC: NORMAL
GLUCOSE SERPL-MCNC: 96 MG/DL (ref 65–99)
HADV DNA SPEC NAA+PROBE: NOT DETECTED
HCOV 229E RNA SPEC QL NAA+PROBE: NOT DETECTED
HCOV HKU1 RNA SPEC QL NAA+PROBE: NOT DETECTED
HCOV NL63 RNA SPEC QL NAA+PROBE: NOT DETECTED
HCOV OC43 RNA SPEC QL NAA+PROBE: NOT DETECTED
HCT VFR BLD AUTO: 26.1 % (ref 34–46.6)
HCT VFR BLD AUTO: 28.8 % (ref 34–46.6)
HGB BLD-MCNC: 8.7 G/DL (ref 12–15.9)
HGB BLD-MCNC: 9.6 G/DL (ref 12–15.9)
HMPV RNA NPH QL NAA+NON-PROBE: NOT DETECTED
HPIV1 RNA ISLT QL NAA+PROBE: NOT DETECTED
HPIV2 RNA SPEC QL NAA+PROBE: NOT DETECTED
HPIV3 RNA NPH QL NAA+PROBE: NOT DETECTED
HPIV4 P GENE NPH QL NAA+PROBE: NOT DETECTED
LYMPHOCYTES # BLD MANUAL: 3.71 10*3/MM3 (ref 0.7–3.1)
LYMPHOCYTES NFR BLD MANUAL: 2 % (ref 5–12)
M PNEUMO IGG SER IA-ACNC: NOT DETECTED
MCH RBC QN AUTO: 30 PG (ref 26.6–33)
MCHC RBC AUTO-ENTMCNC: 33.3 G/DL (ref 31.5–35.7)
MCV RBC AUTO: 90 FL (ref 79–97)
MONOCYTES # BLD: 0.93 10*3/MM3 (ref 0.1–0.9)
NEUTROPHILS # BLD AUTO: 40.8 10*3/MM3 (ref 1.7–7)
NEUTROPHILS NFR BLD MANUAL: 88 % (ref 42.7–76)
PHOSPHATE SERPL-MCNC: 2.5 MG/DL (ref 2.5–4.5)
PLAT MORPH BLD: NORMAL
PLATELET # BLD AUTO: 240 10*3/MM3 (ref 140–450)
PMV BLD AUTO: 11 FL (ref 6–12)
POTASSIUM SERPL-SCNC: 3.4 MMOL/L (ref 3.5–5.2)
RBC # BLD AUTO: 3.2 10*6/MM3 (ref 3.77–5.28)
RBC MORPH BLD: NORMAL
RHINOVIRUS RNA SPEC NAA+PROBE: NOT DETECTED
RSV RNA NPH QL NAA+NON-PROBE: NOT DETECTED
SARS-COV-2 RNA NPH QL NAA+NON-PROBE: NOT DETECTED
SODIUM SERPL-SCNC: 138 MMOL/L (ref 136–145)
UNIT  ABO: NORMAL
UNIT  RH: NORMAL
VARIANT LYMPHS NFR BLD MANUAL: 8 % (ref 19.6–45.3)
WBC MORPH BLD: NORMAL
WBC NRBC COR # BLD: 46.36 10*3/MM3 (ref 3.4–10.8)

## 2023-11-08 PROCEDURE — 25010000002 ERTAPENEM PER 500 MG: Performed by: INTERNAL MEDICINE

## 2023-11-08 PROCEDURE — 87206 SMEAR FLUORESCENT/ACID STAI: CPT | Performed by: INTERNAL MEDICINE

## 2023-11-08 PROCEDURE — 25010000002 PROPOFOL 10 MG/ML EMULSION: Performed by: NURSE ANESTHETIST, CERTIFIED REGISTERED

## 2023-11-08 PROCEDURE — 88305 TISSUE EXAM BY PATHOLOGIST: CPT | Performed by: INTERNAL MEDICINE

## 2023-11-08 PROCEDURE — 86037 ANCA TITER EACH ANTIBODY: CPT | Performed by: INTERNAL MEDICINE

## 2023-11-08 PROCEDURE — 87205 SMEAR GRAM STAIN: CPT | Performed by: INTERNAL MEDICINE

## 2023-11-08 PROCEDURE — 76000 FLUOROSCOPY <1 HR PHYS/QHP: CPT

## 2023-11-08 PROCEDURE — 87071 CULTURE AEROBIC QUANT OTHER: CPT | Performed by: INTERNAL MEDICINE

## 2023-11-08 PROCEDURE — 86602 ANTINOMYCES ANTIBODY: CPT | Performed by: INTERNAL MEDICINE

## 2023-11-08 PROCEDURE — 86235 NUCLEAR ANTIGEN ANTIBODY: CPT | Performed by: INTERNAL MEDICINE

## 2023-11-08 PROCEDURE — 86331 IMMUNODIFFUSION OUCHTERLONY: CPT | Performed by: INTERNAL MEDICINE

## 2023-11-08 PROCEDURE — 0BC18ZZ EXTIRPATION OF MATTER FROM TRACHEA, VIA NATURAL OR ARTIFICIAL OPENING ENDOSCOPIC: ICD-10-PCS | Performed by: INTERNAL MEDICINE

## 2023-11-08 PROCEDURE — 94640 AIRWAY INHALATION TREATMENT: CPT

## 2023-11-08 PROCEDURE — 93010 ELECTROCARDIOGRAM REPORT: CPT | Performed by: INTERNAL MEDICINE

## 2023-11-08 PROCEDURE — 88312 SPECIAL STAINS GROUP 1: CPT | Performed by: INTERNAL MEDICINE

## 2023-11-08 PROCEDURE — 85018 HEMOGLOBIN: CPT | Performed by: INTERNAL MEDICINE

## 2023-11-08 PROCEDURE — 99232 SBSQ HOSP IP/OBS MODERATE 35: CPT

## 2023-11-08 PROCEDURE — 25010000002 ESMOLOL 100 MG/10ML SOLUTION: Performed by: NURSE ANESTHETIST, CERTIFIED REGISTERED

## 2023-11-08 PROCEDURE — 3E1F88Z IRRIGATION OF RESPIRATORY TRACT USING IRRIGATING SUBSTANCE, VIA NATURAL OR ARTIFICIAL OPENING ENDOSCOPIC: ICD-10-PCS | Performed by: INTERNAL MEDICINE

## 2023-11-08 PROCEDURE — 80069 RENAL FUNCTION PANEL: CPT | Performed by: INTERNAL MEDICINE

## 2023-11-08 PROCEDURE — 88112 CYTOPATH CELL ENHANCE TECH: CPT | Performed by: INTERNAL MEDICINE

## 2023-11-08 PROCEDURE — 86225 DNA ANTIBODY NATIVE: CPT | Performed by: INTERNAL MEDICINE

## 2023-11-08 PROCEDURE — 25010000002 METHYLPREDNISOLONE PER 125 MG: Performed by: INTERNAL MEDICINE

## 2023-11-08 PROCEDURE — 86609 BACTERIUM ANTIBODY: CPT | Performed by: INTERNAL MEDICINE

## 2023-11-08 PROCEDURE — 93005 ELECTROCARDIOGRAM TRACING: CPT | Performed by: INTERNAL MEDICINE

## 2023-11-08 PROCEDURE — 85007 BL SMEAR W/DIFF WBC COUNT: CPT | Performed by: NURSE PRACTITIONER

## 2023-11-08 PROCEDURE — 86671 FUNGUS NES ANTIBODY: CPT | Performed by: INTERNAL MEDICINE

## 2023-11-08 PROCEDURE — 83516 IMMUNOASSAY NONANTIBODY: CPT | Performed by: INTERNAL MEDICINE

## 2023-11-08 PROCEDURE — 94799 UNLISTED PULMONARY SVC/PX: CPT

## 2023-11-08 PROCEDURE — 86606 ASPERGILLUS ANTIBODY: CPT | Performed by: INTERNAL MEDICINE

## 2023-11-08 PROCEDURE — 85025 COMPLETE CBC W/AUTO DIFF WBC: CPT | Performed by: NURSE PRACTITIONER

## 2023-11-08 PROCEDURE — 87116 MYCOBACTERIA CULTURE: CPT | Performed by: INTERNAL MEDICINE

## 2023-11-08 PROCEDURE — 71045 X-RAY EXAM CHEST 1 VIEW: CPT

## 2023-11-08 PROCEDURE — 25010000002 PHENYLEPHRINE 10 MG/ML SOLUTION: Performed by: NURSE ANESTHETIST, CERTIFIED REGISTERED

## 2023-11-08 PROCEDURE — 0202U NFCT DS 22 TRGT SARS-COV-2: CPT | Performed by: INTERNAL MEDICINE

## 2023-11-08 PROCEDURE — 85014 HEMATOCRIT: CPT | Performed by: INTERNAL MEDICINE

## 2023-11-08 PROCEDURE — 0BBC8ZX EXCISION OF RIGHT UPPER LUNG LOBE, VIA NATURAL OR ARTIFICIAL OPENING ENDOSCOPIC, DIAGNOSTIC: ICD-10-PCS | Performed by: INTERNAL MEDICINE

## 2023-11-08 PROCEDURE — 0BCM8ZZ EXTIRPATION OF MATTER FROM BILATERAL LUNGS, VIA NATURAL OR ARTIFICIAL OPENING ENDOSCOPIC: ICD-10-PCS | Performed by: INTERNAL MEDICINE

## 2023-11-08 PROCEDURE — 99222 1ST HOSP IP/OBS MODERATE 55: CPT | Performed by: NURSE PRACTITIONER

## 2023-11-08 PROCEDURE — 94760 N-INVAS EAR/PLS OXIMETRY 1: CPT

## 2023-11-08 PROCEDURE — 25810000003 LACTATED RINGERS PER 1000 ML: Performed by: INTERNAL MEDICINE

## 2023-11-08 PROCEDURE — 25010000002 POTASSIUM CHLORIDE 10 MEQ/100ML SOLUTION: Performed by: INTERNAL MEDICINE

## 2023-11-08 PROCEDURE — 71046 X-RAY EXAM CHEST 2 VIEWS: CPT

## 2023-11-08 PROCEDURE — BB24ZZZ COMPUTERIZED TOMOGRAPHY (CT SCAN) OF BILATERAL LUNGS: ICD-10-PCS | Performed by: RADIOLOGY

## 2023-11-08 PROCEDURE — 94761 N-INVAS EAR/PLS OXIMETRY MLT: CPT

## 2023-11-08 PROCEDURE — 87102 FUNGUS ISOLATION CULTURE: CPT | Performed by: INTERNAL MEDICINE

## 2023-11-08 PROCEDURE — 99232 SBSQ HOSP IP/OBS MODERATE 35: CPT | Performed by: NURSE PRACTITIONER

## 2023-11-08 PROCEDURE — 0B9C8ZX DRAINAGE OF RIGHT UPPER LUNG LOBE, VIA NATURAL OR ARTIFICIAL OPENING ENDOSCOPIC, DIAGNOSTIC: ICD-10-PCS | Performed by: INTERNAL MEDICINE

## 2023-11-08 RX ORDER — LIDOCAINE HYDROCHLORIDE 10 MG/ML
INJECTION, SOLUTION EPIDURAL; INFILTRATION; INTRACAUDAL; PERINEURAL AS NEEDED
Status: DISCONTINUED | OUTPATIENT
Start: 2023-11-08 | End: 2023-11-08 | Stop reason: HOSPADM

## 2023-11-08 RX ORDER — SODIUM CHLORIDE 9 MG/ML
INJECTION, SOLUTION INTRAVENOUS CONTINUOUS PRN
Status: COMPLETED | OUTPATIENT
Start: 2023-11-08 | End: 2023-11-08

## 2023-11-08 RX ORDER — SODIUM CHLORIDE, SODIUM LACTATE, POTASSIUM CHLORIDE, CALCIUM CHLORIDE 600; 310; 30; 20 MG/100ML; MG/100ML; MG/100ML; MG/100ML
30 INJECTION, SOLUTION INTRAVENOUS CONTINUOUS
Status: DISCONTINUED | OUTPATIENT
Start: 2023-11-08 | End: 2023-11-09

## 2023-11-08 RX ORDER — ESMOLOL HYDROCHLORIDE 10 MG/ML
INJECTION INTRAVENOUS AS NEEDED
Status: DISCONTINUED | OUTPATIENT
Start: 2023-11-08 | End: 2023-11-08 | Stop reason: SURG

## 2023-11-08 RX ORDER — POTASSIUM CHLORIDE 7.45 MG/ML
10 INJECTION INTRAVENOUS ONCE
Status: COMPLETED | OUTPATIENT
Start: 2023-11-08 | End: 2023-11-08

## 2023-11-08 RX ORDER — LIDOCAINE HYDROCHLORIDE 20 MG/ML
INJECTION, SOLUTION INFILTRATION; PERINEURAL AS NEEDED
Status: DISCONTINUED | OUTPATIENT
Start: 2023-11-08 | End: 2023-11-08 | Stop reason: SURG

## 2023-11-08 RX ORDER — ACETYLCYSTEINE 200 MG/ML
SOLUTION ORAL; RESPIRATORY (INHALATION) AS NEEDED
Status: DISCONTINUED | OUTPATIENT
Start: 2023-11-08 | End: 2023-11-08 | Stop reason: HOSPADM

## 2023-11-08 RX ORDER — METHYLPREDNISOLONE SODIUM SUCCINATE 125 MG/2ML
80 INJECTION, POWDER, LYOPHILIZED, FOR SOLUTION INTRAMUSCULAR; INTRAVENOUS ONCE
Status: COMPLETED | OUTPATIENT
Start: 2023-11-08 | End: 2023-11-08

## 2023-11-08 RX ORDER — LIDOCAINE HYDROCHLORIDE 10 MG/ML
20 INJECTION, SOLUTION INFILTRATION; PERINEURAL ONCE
Status: DISCONTINUED | OUTPATIENT
Start: 2023-11-08 | End: 2023-11-22 | Stop reason: HOSPADM

## 2023-11-08 RX ORDER — PROPOFOL 10 MG/ML
VIAL (ML) INTRAVENOUS AS NEEDED
Status: DISCONTINUED | OUTPATIENT
Start: 2023-11-08 | End: 2023-11-08 | Stop reason: SURG

## 2023-11-08 RX ORDER — ALBUTEROL SULFATE 2.5 MG/3ML
2.5 SOLUTION RESPIRATORY (INHALATION) ONCE
Status: COMPLETED | OUTPATIENT
Start: 2023-11-08 | End: 2023-11-08

## 2023-11-08 RX ORDER — PHENYLEPHRINE HYDROCHLORIDE 10 MG/ML
INJECTION INTRAVENOUS AS NEEDED
Status: DISCONTINUED | OUTPATIENT
Start: 2023-11-08 | End: 2023-11-08 | Stop reason: SURG

## 2023-11-08 RX ADMIN — ERTAPENEM SODIUM 1000 MG: 1 INJECTION, POWDER, LYOPHILIZED, FOR SOLUTION INTRAMUSCULAR; INTRAVENOUS at 16:41

## 2023-11-08 RX ADMIN — POTASSIUM CHLORIDE 10 MEQ: 7.46 INJECTION, SOLUTION INTRAVENOUS at 16:42

## 2023-11-08 RX ADMIN — PHENYLEPHRINE HYDROCHLORIDE 100 MCG: 10 INJECTION INTRAVENOUS at 10:54

## 2023-11-08 RX ADMIN — DOCUSATE SODIUM 50MG AND SENNOSIDES 8.6MG 2 TABLET: 8.6; 5 TABLET, FILM COATED ORAL at 21:52

## 2023-11-08 RX ADMIN — METOPROLOL SUCCINATE 25 MG: 25 TABLET, EXTENDED RELEASE ORAL at 08:38

## 2023-11-08 RX ADMIN — ALBUTEROL SULFATE 2.5 MG: 2.5 SOLUTION RESPIRATORY (INHALATION) at 12:11

## 2023-11-08 RX ADMIN — Medication 3 MG: at 21:51

## 2023-11-08 RX ADMIN — SODIUM CHLORIDE, POTASSIUM CHLORIDE, SODIUM LACTATE AND CALCIUM CHLORIDE 30 ML/HR: 600; 310; 30; 20 INJECTION, SOLUTION INTRAVENOUS at 10:22

## 2023-11-08 RX ADMIN — Medication 250 MG: at 08:38

## 2023-11-08 RX ADMIN — Medication 250 MG: at 21:51

## 2023-11-08 RX ADMIN — SIMETHICONE 80 MG: 80 TABLET, CHEWABLE ORAL at 21:52

## 2023-11-08 RX ADMIN — ACETAMINOPHEN 650 MG: 325 TABLET ORAL at 21:51

## 2023-11-08 RX ADMIN — PROPOFOL 40 MG: 10 INJECTION, EMULSION INTRAVENOUS at 10:52

## 2023-11-08 RX ADMIN — SODIUM CHLORIDE 30 ML/HR: 9 INJECTION, SOLUTION INTRAVENOUS at 15:33

## 2023-11-08 RX ADMIN — PHENYLEPHRINE HYDROCHLORIDE 200 MCG: 10 INJECTION INTRAVENOUS at 11:00

## 2023-11-08 RX ADMIN — LIDOCAINE HYDROCHLORIDE 60 MG: 20 INJECTION, SOLUTION INFILTRATION; PERINEURAL at 10:52

## 2023-11-08 RX ADMIN — PHENYLEPHRINE HYDROCHLORIDE 200 MCG: 10 INJECTION INTRAVENOUS at 10:56

## 2023-11-08 RX ADMIN — PHENYLEPHRINE HYDROCHLORIDE 300 MCG: 10 INJECTION INTRAVENOUS at 11:02

## 2023-11-08 RX ADMIN — ESMOLOL HYDROCHLORIDE 10 MG: 100 INJECTION, SOLUTION INTRAVENOUS at 11:51

## 2023-11-08 RX ADMIN — LATANOPROST 1 DROP: 50 SOLUTION/ DROPS OPHTHALMIC at 21:52

## 2023-11-08 RX ADMIN — PROPOFOL 50 MCG/KG/MIN: 10 INJECTION, EMULSION INTRAVENOUS at 10:52

## 2023-11-08 RX ADMIN — SERTRALINE 100 MG: 100 TABLET, FILM COATED ORAL at 08:38

## 2023-11-08 RX ADMIN — NITROGLYCERIN 0.4 MG: 0.4 TABLET SUBLINGUAL at 22:49

## 2023-11-08 RX ADMIN — METHYLPREDNISOLONE SODIUM SUCCINATE 80 MG: 125 INJECTION, POWDER, FOR SOLUTION INTRAMUSCULAR; INTRAVENOUS at 12:18

## 2023-11-08 RX ADMIN — PHENYLEPHRINE HYDROCHLORIDE 100 MCG: 10 INJECTION INTRAVENOUS at 10:49

## 2023-11-08 RX ADMIN — FAMOTIDINE 20 MG: 20 TABLET ORAL at 08:38

## 2023-11-08 NOTE — H&P (VIEW-ONLY)
Dr. DELL Alford    [facility] ENDO SUITES        Patient ID:  Name:  Catherine Boyer  MRN:  [id number]  1940  83 y.o.  female            CC/Reason for visit: Iatrogenic right pneumothorax    Interval hx: Patient is alert.  Oxygenating well on 3 L nasal cannula.  Complaining of some chest soreness and shortness of breath.  Discussed x-ray results with radiologist.  Patient has developed a right pneumothorax and atelectasis left lung base.  She is also developed hypotension.    ROS: Some right chest pain.  Some shortness of breath.  No abdominal pain    I reviewed old medical records.  Past medical history, social history and family history: Unchanged from admission H&P.      Vitals:  Vitals:    11/08/23 1249 11/08/23 1258 11/08/23 1312 11/08/23 1427   BP: (!) 80/54 92/43 91/49 (!) 89/48   BP Location: Right arm Right arm Right arm Right arm   Patient Position: Lying Lying Lying Lying   Pulse: 98 101 102 101   Resp: 22 22 22    Temp:       TempSrc:       SpO2: 93% 94% 93% 91%   Weight:               Body mass index is 25.8 kg/m².    Intake/Output Summary (Last 24 hours) at 11/8/2023 1430  Last data filed at 11/8/2023 1141  Gross per 24 hour   Intake 500 ml   Output 800 ml   Net -300 ml       Exam:  GEN:  No distress  Alert, oriented x 3.   LUNGS: Clear breath sounds bilat, no use of accessory muscles  CV:  Normal S1S2, without murmur, no edema  ABD:  Non tender, no enlarged liver or masses      Scheduled meds:  ertapenem, 1,000 mg, Intravenous, Q24H  famotidine, 20 mg, Oral, Daily  lactated ringers, 500 mL, Intravenous, Once  latanoprost, 1 drop, Both Eyes, Nightly  metoprolol succinate XL, 25 mg, Oral, Daily  Momelotinib Dihydrochloride, 200 mg, Oral, Daily  potassium chloride, 10 mEq, Intravenous, Once  saccharomyces boulardii, 250 mg, Oral, BID  senna-docusate sodium, 2 tablet, Oral, BID  sertraline, 100 mg, Oral, Daily      IV meds:                      lactated ringers, 30 mL/hr, Last Rate: 30  mL/hr (11/08/23 1022)        Data Review:   I reviewed the patient's medications and new clinical results.          Lab Results   Component Value Date    CALCIUM 8.3 (L) 11/08/2023    PHOS 2.5 11/08/2023    MG 1.9 11/05/2023    MG 1.8 09/27/2023    MG 2.0 08/28/2023     Results from last 7 days   Lab Units 11/08/23  0559 11/07/23  0506 11/06/23  2100 11/06/23  0611 11/05/23  0124 11/04/23  0626 11/03/23  0540 11/02/23  1631   SODIUM mmol/L 138 138  --  138   < > 139   < > 132*   POTASSIUM mmol/L 3.4* 3.5  --  3.5   < > 3.7   < > 4.2   CHLORIDE mmol/L 104 107  --  109*   < > 106   < > 98   CO2 mmol/L 24.6 23.6  --  20.9*   < > 19.0*   < > 20.0*   BUN mg/dL 7* 11  --  16   < > 42*   < > 55*   CREATININE mg/dL 0.72 0.88  --  0.94   < > 1.79*   < > 2.96*   CALCIUM mg/dL 8.3* 8.3*  --  8.1*   < > 8.2*   < > 8.5*   BILIRUBIN mg/dL  --   --   --   --   --   --   --  0.5   ALK PHOS U/L  --   --   --   --   --   --   --  115   ALT (SGPT) U/L  --   --   --   --   --   --   --  17   AST (SGOT) U/L  --   --   --   --   --   --   --  22   GLUCOSE mg/dL 96 97  --  95   < > 112*   < > 148*   WBC 10*3/mm3 46.36* 35.70*  --  41.43*  --   --    < > 44.77*   HEMOGLOBIN g/dL 9.6* 7.7* 8.1* 6.8*  --   --    < > 8.1*   PLATELETS 10*3/mm3 240 278  --  271  --   --    < > 522*   PROCALCITONIN ng/mL  --   --   --   --   --  0.09  --   --     < > = values in this interval not displayed.     Results from last 7 days   Lab Units 11/04/23  1603 11/03/23  1603   BLOODCX  No growth at 3 days  No growth at 3 days  --    URINECX   --  >100,000 CFU/mL Escherichia coli ESBL*           ASSESSMENT:   Right pneumothorax, iatrogenic after bronchoscopic biopsy  Bilateral pulmonary infiltrates  Hypotension  Lingula pleural-based opacity  Acute kidney injury  CAD (coronary artery disease)  Paroxysmal atrial fibrillation  Myeloproliferative disorder  Type 2 diabetes mellitus with circulatory disorder, without long-term current use of insulin  Hypertension    Personal history of transient ischemic attack (TIA), 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 deficits  Hypoxemia      PLAN:  Unfortunately the patient developed post bronchoscopy complications.  She developed pneumothorax.  She had more than usual bleeding during the procedure which obligated me to stop after 3 biopsy samples.  I discussed the case with interventional radiologist.  They will place a CT-guided small bore pigtail chest tube to evacuate the post bronchoscopy iatrogenic right pneumothorax.  I will be connected to [facility] waterseal and continues negative wall suction.  She has some atelectasis left base.  I will start her on a flutter valve.  We will check hemoglobin tonight since she is known to have significant anemia and required transfusion twice over the past 2 days prior to bronchoscopy procedure.  Continue to follow microbiology results of her bronchoalveolar lavage.  Continue to follow biopsy results once pathology report is available.  This patient has several chronic medical conditions, and now several acute medical illnesses.  Extensive amount of data was reviewed.  Images were directly visualized by me.  This patient warrants high complexity medical decision-making.          Raoul Alford MD  11/8/2023

## 2023-11-08 NOTE — CONSULTS
.            [facility]    Palliative Care Initial Consult   Attending Physician: Kenton Meeks MD  Referring Provider: Dr Tommy Clemenet    Reason for Referral: assistance with clarification of goals of care  Family/Support: children     Code Status and Medical Interventions:   Ordered at: 11/02/23 1949     Code Status (Patient has no pulse and is not breathing):    CPR (Attempt to Resuscitate)     Medical Interventions (Patient has pulse or is breathing):    Full     Goals of Care: TBD.    HPI:   83 y.o. female with history of myelofibrosis, diastolic congestive heart failure, coronary artery disease, ischemic attack 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 deficits, hypertension, diabetes mellitus type 2, paroxysmal atrial fibrillation.  She resided at [facility] prior to admission  Patient presented to [facility]2023 related to nausea, vomiting, malaise, abdominal pain and cramping, and diarrhea.  Work-up in ER revealed acute kidney injury and incidental finding of lingula mass.  Consults were placed for nephrology, pulmonology, and gastroenterology.  Her hospital course has been complicated with left lower lobe pneumonia, acute urinary tract infection, ESBL.  She has been treated with IV Invanz.  In addition,  acute anemia (required 2 units of PRBCs, that has delayed a bronchoscopy that was performed this morning.  The palliative care team was consulted for support with goals of care conversation.  Palliative Care Spoke With: patient and family  Quality of life: fair  Functional Status: Pt performed bed mobility with CGA, STS with min asst, and ambulated 20' with FWW and CGA. Pt demonstrated decrease activity tolerance and generalized weakness. Per PT notes on 11/3/2023  Due to the Palliative Care Topics Discussed: palliative care, goals of care, care options, [facility], and discharge options we will establish an advance care  plan.   Advance Care Planning   Advance Care Planning Discussion: see below          Review of Systems   Constitutional: Positive for decreased appetite and malaise/fatigue.   Cardiovascular:  Negative for chest pain.   Respiratory:  Negative for shortness of breath.    Gastrointestinal:  Negative for abdominal pain and nausea.   Neurological:  Positive for weakness.   Psychiatric/Behavioral:  Positive for depression. The patient is nervous/anxious.        1- Pain Assessment  Pain Location: chest (LLE)  Pain Description: intermittent    Past Medical History:   Diagnosis Date    Atherosclerosis of abdominal aorta 02/05/2023    Atrial fibrillation     Breast cancer     CAD (coronary artery disease)     NSTEMI 2/2022: 90% ostial LAD, 99% D1, 70% mid-distal LAD (medical therapy). She received two stents (2.5x18 and 2.5x26mm [place] LEFTY) but I don't know which one went to which lesion.    Carotid atherosclerosis     Cholecystitis 11/22/2022    Added automatically from request for surgery [id number]    Chronic diastolic (congestive) heart failure     COVID 10/29/2022    GERD (gastroesophageal reflux disease)     Glaucoma     History of cataract     Hypertension     Microcytic anemia     per Dr. oleg pate office  note 6/30/22-dd    Myeloproliferative disorder     JAK2 positive    Nonbacterial thrombotic endocarditis     6/2021: 4x5mm vegetation on the ventricular surface of the anterior MV, negative blood cultures    Porcelain gallbladder     PUD (peptic ulcer disease)     TIA (transient ischemic attack)     Type 2 diabetes mellitus     Type 2 diabetes mellitus with circulatory disorder, without long-term current use of insulin 07/14/2022    Upper GI bleed      Past Surgical History:   Procedure Laterality Date    BREAST LUMPECTOMY  1999    CARDIAC CATHETERIZATION N/A 09/02/2022    Procedure: Coronary angiography;  Surgeon: Guero Verde MD;  Location: [facility] INVASIVE LOCATION;  Service: Cardiovascular;  Laterality:  "N/A;    CARDIAC CATHETERIZATION N/A 09/02/2022    Procedure: Stent LEFTY coronary;  Surgeon: Guero Verde MD;  Location: [facility] CATH INVASIVE LOCATION;  Service: Cardiovascular;  Laterality: N/A;    CATARACT EXTRACTION  2011    CHOLECYSTECTOMY      CHOLECYSTECTOMY WITH INTRAOPERATIVE CHOLANGIOGRAM N/A 11/28/2022    Procedure: CHOLECYSTECTOMY LAPAROSCOPIC INTRAOPERATIVE CHOLANGIOGRAM;  Surgeon: Dani Grover Jr., MD;  Location: [facility] MAIN OR;  Service: General;  Laterality: N/A;    COLONOSCOPY N/A 02/09/2023    Procedure: COLONOSCOPY TO CECUM & T.I.;  Surgeon: Guero Ferris MD;  Location: [facility] ENDOSCOPY;  Service: Gastroenterology;  Laterality: N/A;  PRE- MELENA, GI BLEED  POST- DIVERTICULOSIS, INT HEMORRHOIDS    ENDOSCOPY N/A 01/25/2023    Procedure: ESOPHAGOGASTRODUODENOSCOPY WITH BIOPSIES;  Surgeon: Charles Diaz MD;  Location: [facility] ENDOSCOPY;  Service: Gastroenterology;  Laterality: N/A;  pre: abd pain, nausea  post: hiatal hernia, mild gastritis, sloughing of the esophagus    ENTEROSCOPY SMALL BOWEL N/A 02/09/2023    Procedure: ENTEROSCOPY SMALL BOWEL;  Surgeon: Guero Ferris MD;  Location: [facility] ENDOSCOPY;  Service: Gastroenterology;  Laterality: N/A;  PRE- MELENA, GI BLEED  POST- HIATAL HERNIA    JOINT REPLACEMENT      UPPER GASTROINTESTINAL ENDOSCOPY       Social History     Socioeconomic History    Marital status:    Tobacco Use    Smoking status: Former     Packs/day: 1.00     Years: 20.00     Additional pack years: 0.00     Total pack years: 20.00     Types: Cigarettes     Passive exposure: Past    Smokeless tobacco: Never    Tobacco comments:     30  years ago   Vaping Use    Vaping Use: Never used   Substance and Sexual Activity    Alcohol use: Yes     Comment: \"rare\"    Drug use: Never    Sexual activity: Defer       Current Facility-Administered Medications   Medication Dose Route Frequency Provider Last Rate Last Admin    acetaminophen (TYLENOL) tablet 650 mg  650 mg Oral Q4H " PRN Jaimee Macias MD        sennosides-docusate (PERICOLACE) 8.6-50 MG per tablet 2 tablet  2 tablet Oral BID Jaimee Macias MD        And    polyethylene glycol (MIRALAX) packet 17 g  17 g Oral Daily PRN Jaimee Macias MD        And    bisacodyl (DULCOLAX) EC tablet 5 mg  5 mg Oral Daily PRN Jaimee Macias MD        And    bisacodyl (DULCOLAX) suppository 10 mg  10 mg Rectal Daily PRN Jaimee Macias MD        ertapenem (INVanz) 1,000 mg in sodium chloride 0.9 % 100 mL IVPB-VTB  1,000 mg Intravenous Q24H Kenton Meeks  mL/hr at 11/07/23 1125 1,000 mg at 11/07/23 1125    famotidine (PEPCID) tablet 20 mg  20 mg Oral Daily Jaimee Macias MD   20 mg at 11/08/23 0838    HYDROcodone-acetaminophen (NORCO) 5-325 MG per tablet 1 tablet  1 tablet Oral Q6H PRN Junior Doan MD   1 tablet at 11/04/23 1932    latanoprost (XALATAN) 0.005 % ophthalmic solution 1 drop  1 drop Both Eyes Nightly Jaimee Macias MD   1 drop at 11/07/23 2100    melatonin tablet 3 mg  3 mg Oral Nightly PRN Jaimee Macias MD   3 mg at 11/07/23 2100    metoprolol succinate XL (TOPROL-XL) 24 hr tablet 25 mg  25 mg Oral Daily Jaimee Macias MD   25 mg at 11/08/23 0838    Momelotinib Dihydrochloride (OJJAARA) 200 mg  200 mg Oral Daily Jaimee Macias MD   200 mg at 11/08/23 0838    morphine injection 2 mg  2 mg Intravenous Q3H PRN Dexter Reynolds APRN        nitroglycerin (NITROSTAT) SL tablet 0.4 mg  0.4 mg Sublingual Q5 Min PRN Jennie Faye MD   0.4 mg at 11/05/23 2157    ondansetron (ZOFRAN) tablet 4 mg  4 mg Oral Q6H PRN Jaimee Macias MD        Or    ondansetron (ZOFRAN) injection 4 mg  4 mg Intravenous Q6H PRN Jaimee Macias MD   4 mg at 11/06/23 1156    Phosphorus Replacement - Follow Nurse / BPA Driven Protocol   Does not apply PRN Junior Doan MD        saccharomyces boulardii (FLORASTOR) capsule 250 mg  250 mg Oral BID Stingl,  Jaimee Mckeon MD   250 mg at 11/08/23 0838    sertraline (ZOLOFT) tablet 100 mg  100 mg Oral Daily Jaimee Macias MD   100 mg at 11/08/23 0838    simethicone (MYLICON) chewable tablet 80 mg  80 mg Oral 4x Daily PRN Dexter Reynolds, APRN   80 mg at 11/06/23 1157    sodium chloride 0.9 % flush 10 mL  10 mL Intravenous PRN Tommy Clemente MD              acetaminophen    senna-docusate sodium **AND** polyethylene glycol **AND** bisacodyl **AND** bisacodyl    HYDROcodone-acetaminophen    melatonin    Morphine    nitroglycerin    ondansetron **OR** ondansetron    Phosphorus Replacement - Follow Nurse / BPA Driven Protocol    simethicone    Insert Peripheral IV **AND** sodium chloride    Allergies   Allergen Reactions    Aspirin Unknown - Low Severity    Oxycodone Unknown - Low Severity    Hydroxyurea Other (See Comments)     Her hemoglobin drop and was stop in February 2022 and start taking Jakafi    Diphenhydramine Hcl Anxiety and Unknown (See Comments)     Benadryl IVP     Attest that current medications reviewed  including but not limited to prescriptions, over-the counter, herbals and vitamin/mineral/dietary (nutritional) supplements for name, route of administration, type, dose and frequency and are current using all immediate resources available at time of dictation.      Intake/Output Summary (Last 24 hours) at 11/8/2023 0917  Last data filed at 11/8/2023 0800  Gross per 24 hour   Intake 308.75 ml   Output 1600 ml   Net -1291.25 ml       Physical Exam:    Diagnostics: Reviewed  /46 (BP Location: Right arm, Patient Position: Lying)   Pulse 116   Temp 98.2 °F (36.8 °C) (Oral)   Resp 16   Wt 67.7 kg (149 lb 4 oz)   SpO2 98%   BMI 25.80 kg/m²     Constitutional:       General: Sleeping.      Appearance: Not in distress.      Interventions: Nasal cannula in place.      Comments: Elderly    HENT:      Head:      Comments: [place]  Pulmonary:      Effort: Pulmonary effort is normal.      Breath  sounds: Normal breath sounds.   Cardiovascular:      PMI at left midclavicular line.   Neurological:      Mental Status: Alert, oriented to person, place, and time and easily aroused.   Psychiatric:         Mood and Affect: Mood and affect normal.         Speech: Speech normal.         Behavior: Behavior normal.         Thought Content: Thought content normal.         Cognition and Memory: Cognition normal.         Judgment: Judgment normal.       Patient status: Disease state: Controlled with current treatments.  Functional status: Palliative Performance Scale Score: Performance 50% based on the following measures: Ambulation: Mainly sit or lie down, Activity and Evidence of Disease: Unable to do any work, extensive evidence of disease, Self-Care: Considerable assistance required,  Intake: Normal or reduced, LOC: Full or confusion   Nutritional status: Albumin 3.5 on 11/ 8/2023 Body mass index is 25.8 kg/m².    Family support: The patient receives support from her children and extended family..  Advance Directives: Advance Directive Status: Patient has advance directive, copy in chart   POA/Healthcare surrogate-Karyn .       Impression/Problem List:    Acute cystitis  Bilateral pulmonary infiltrates  Lingula mass  Chronic diastolic heart failure  Aortic stenosis  Paroxysmal atrial fibrillation     Myelofibrosis  Coronary artery disease  History of transient ischemic attack 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 deficits    Recommendations/Plan:  1. Provide support with goals of care        2.  Palliative care encounter  The patient was sleeping upon my arrival to the room.  She was due to go  down for bronchoscopy and was tired from not much rest during the night.  There were two daughters (Karen and Ila) at bedside and requested to speak with me privately and patient agreeable.  The patient does have 5 children.  She does have durable power  paperwork on file that designates her  daughter, Karen and then Karan.  The patient has resided at [facility] for about 1-1/2 years, which was after the death of her .  Ila is the main caregiver ,as her other siblings live out of state.  Both children have a very good understanding of patient's condition, both acute and chronic, which we reviewed.  They report over the last 3 months the patient has been progressively declining with inability to care for herself.  All she wants to do is sleep she is extremely tired and also reduced appetite.  At this time they are uncertain what direction to take.  But they do want her to enjoy what time she has left them to be okay with that. They are willing to support her with any decision she makes.  I provided  both verbal and written information regarding palliative and hospice care.  Ila does acknowledge some caregiver fatigue. We will plan to meet tomorrow and speak with the patient to include her with decision making. I will plan to address CODE status/medical interventions. Of note, there was an attempt to have advance directive completed while inpatient and they patient got upset regarding the conversation per her daughters. I spoke with KARRI Guevara.       Thank you for this consult and allowing us to participate in patient's plan of care. Palliative Care Team will continue to follow patient.     Time spent:60 minutes spent reviewing medical and medication records, assessing and examining patient, discussing with family, answering questions, providing some guidance about a plan and documentation of care, and coordinating care with other healthcare members, with > 50% time spent face to face.       Eleonora Hercules, ABEL  11/8/2023  09:17 EST

## 2023-11-08 NOTE — PROGRESS NOTES
Name: Catherine Boyer ADMIT: 2023   : 1940  PCP: Kristi Moreau APRN    MRN: [id number] LOS: 6 days   AGE/SEX: 83 y.o. female  ROOM: ENDO/ENDO     Subjective     No acute events. Patient denies new complaints.   No family at bedside.    Objective   Objective   Vital Signs  Temp:  [97.9 °F (36.6 °C)-98.5 °F (36.9 °C)] 98.2 °F (36.8 °C)  Heart Rate:  [] 104  Resp:  [16-21] 21  BP: (104-132)/(46-63) 104/55  SpO2:  [93 %-99 %] 93 %  on  Flow (L/min):  [2] 2;   Device (Oxygen Therapy): nasal cannula  Body mass index is 25.8 kg/m².  Physical Exam  Vitals and nursing note reviewed.   Constitutional:       General: She is not in acute distress.     Appearance: She is not toxic-appearing.      Comments: Elderly, frail   HENT:      Head: Normocephalic and atraumatic.      Nose: Nose normal.      Mouth/Throat:      Mouth: Mucous membranes are moist.      Pharynx: Oropharynx is clear.   Eyes:      Conjunctiva/sclera: Conjunctivae normal.      Pupils: Pupils are equal, round, and reactive to light.   Cardiovascular:      Rate and Rhythm: Normal rate and regular rhythm.   Pulmonary:      Effort: Pulmonary effort is normal.      Breath sounds: Examination of the right-lower field reveals decreased breath sounds. Examination of the left-lower field reveals decreased breath sounds. Decreased breath sounds present.   Abdominal:      General: Bowel sounds are normal. There is no distension.      Palpations: Abdomen is soft.      Tenderness: There is no abdominal tenderness.   Musculoskeletal:         General: No swelling or tenderness.      Cervical back: Neck supple.   Skin:     General: Skin is warm and dry.      Capillary Refill: Capillary refill takes less than 2 seconds.   Neurological:      General: No focal deficit present.      Mental Status: She is alert.      Comments: Hard of hearing       Results Review     I reviewed the patient's new clinical results.  Results from last 7 days   Lab Units  "11/08/23  0559 11/07/23  0506 11/06/23  2100 11/06/23  0611 11/03/23  0540   WBC 10*3/mm3 46.36* 35.70*  --  41.43* 39.23*   HEMOGLOBIN g/dL 9.6* 7.7* 8.1* 6.8* 7.6*   PLATELETS 10*3/mm3 240 278  --  271 413     Results from last 7 days   Lab Units 11/08/23  0559 11/07/23  0506 11/06/23  0611 11/05/23  0905   SODIUM mmol/L 138 138 138 139   POTASSIUM mmol/L 3.4* 3.5 3.5 3.8   CHLORIDE mmol/L 104 107 109* 108*   CO2 mmol/L 24.6 23.6 20.9* 21.4*   BUN mg/dL 7* 11 16 26*   CREATININE mg/dL 0.72 0.88 0.94 1.34*   GLUCOSE mg/dL 96 97 95 112*   Estimated Creatinine Clearance: 53.7 mL/min (by C-G formula based on SCr of 0.72 mg/dL).  Results from last 7 days   Lab Units 11/08/23  0559 11/07/23  0506 11/06/23  0611 11/05/23  0905 11/04/23  0626 11/02/23  1631   ALBUMIN g/dL 3.5 3.5 3.2* 3.6   < > 3.7   BILIRUBIN mg/dL  --   --   --   --   --  0.5   ALK PHOS U/L  --   --   --   --   --  115   AST (SGOT) U/L  --   --   --   --   --  22   ALT (SGPT) U/L  --   --   --   --   --  17    < > = values in this interval not displayed.     Results from last 7 days   Lab Units 11/08/23 0559 11/07/23  1616 11/07/23  0506 11/06/23  0611 11/05/23  0905 11/05/23  0124   CALCIUM mg/dL 8.3*  --  8.3* 8.1* 8.3* 7.9*   ALBUMIN g/dL 3.5  --  3.5 3.2* 3.6  --    MAGNESIUM mg/dL  --   --   --   --   --  1.9   PHOSPHORUS mg/dL 2.5 2.2* 1.8* 1.7* 2.0*  --      Results from last 7 days   Lab Units 11/05/23  0905 11/05/23  0124 11/04/23  2247 11/04/23  1910 11/04/23  1603 11/04/23  0626   PROCALCITONIN ng/mL  --   --   --   --   --  0.09   LACTATE mmol/L 1.8 3.2* 3.1* 3.2*   < >  --     < > = values in this interval not displayed.     COVID19   Date Value Ref Range Status   08/13/2023 Not Detected Not Detected - Ref. Range Final   10/29/2022 Detected (C) Not Detected - Ref. Range Final     No results found for: \"HGBA1C\", \"POCGLU\"  Results for orders placed or performed during the hospital encounter of 11/02/23   Blood Culture - Blood, Arm, Right    " Specimen: Arm, Right; Blood   Result Value Ref Range    Blood Culture No growth at 3 days          CT Chest Without Contrast Diagnostic  Narrative: CT CHEST WO CONTRAST DIAGNOSTIC-     INDICATIONS: Lung mass     TECHNIQUE: Radiation dose reduction techniques were utilized, including  automated exposure control and exposure modulation based on body size.  Unenhanced CHEST CT     COMPARISON: 5/7/2023     FINDINGS:           The heart size is borderline without pericardial effusion. Prominent  coronary arterial calcifications are apparent. Ascending aorta is  dilated, 3.8 cm, not significantly changed. A few small subcentimeter  short axis mediastinal lymph nodes are seen that are not significant by  size criteria. Assessment of vascular, mediastinal, and hilar structures  is limited without intravenous contrast material. Debris is seen in the  esophagus.     The airways appear clear.     No pleural effusion or pneumothorax.     The lungs show again show a pleural-based density in the lingula, axial  image 58, also present the abdomen/pelvis CT from 11/2/2023 (and appears  similar apart from increased extension medially), but representing a  change from the chest CT from 5/7/2023, may be rounded atelectasis,  pneumonia, or neoplasm. Since the recent abdomen/pelvis CT,  consolidation has developed anteriorly laterally in the left lower lobe  suspicious for pneumonia. Fairly extensive groundglass and consolidative  opacities are present throughout both lungs, especially mid to upper  lungs that may represent edema and/or atypical pneumonia.        Upper abdominal structures show no acute findings. Gallbladder is  surgically absent. 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 Mild  nonspecific thickening of the adrenal glands.     Degenerative changes are seen in the spine, shoulders. No acute fracture  is identified.     Impression:    Redemonstration of pleural-based density in the lingula, showing  increased extension  medially, representing a change from the chest CT  from 5/7/2023, may be rounded atelectasis, pneumonia, or neoplasm;  correlate clinically, PET/CT correlation can be obtained as indicated,  in addition to continued CT follow-up. Since the recent abdomen/pelvis  CT, consolidation has developed anteriorly laterally in the left lower  lobe suspicious for pneumonia. Fairly extensive groundglass and  consolidative opacities are present throughout both lungs, especially  mid to upper lungs that may represent edema and/or atypical pneumonia           This report was finalized on 11/4/2023 9:17 AM by Dr. Reilly Ritter M.D on Workstation: [facility]       Scheduled Medications  ertapenem, 1,000 mg, Intravenous, Q24H  famotidine, 20 mg, Oral, Daily  latanoprost, 1 drop, Both Eyes, Nightly  metoprolol succinate XL, 25 mg, Oral, Daily  Momelotinib Dihydrochloride, 200 mg, Oral, Daily  saccharomyces boulardii, 250 mg, Oral, BID  senna-docusate sodium, 2 tablet, Oral, BID  sertraline, 100 mg, Oral, Daily    Infusions  lactated ringers, 30 mL/hr, Last Rate: 30 mL/hr (11/08/23 1022)      Diet  NPO Diet NPO Type: Strict NPO    I reviewed Imaging, labs, microbiology, ECG/telemetry, and records.        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   Acute Cystitis without Hematuria  - urine culture growing ESBL  - continue  ertapenem    KARLI  - likely from volume depletion from reduced oral intake and diarrhea in the setting of diuretic treatment  - resolved with IVF, these are now stopped  - continue holding diuretics  - appreciate nephrology recs    Lung mass  - indeterminate, lingular   - bronchoscopy planned for today  - appreciate pulmonology recs    Aortic Stenosis/Chronic Diastolic CHF  - appears euvolemic  - continue to monitor volume status off of diuretics    Coronary artery disease  Paroxysmal atrial fibrillation  Hypertension  - Eliquis and Plavix held for planned bronchoscopy  - HR and BP controlled, continue metoprolol    Myeloproliferative disorder  - has chronic leukocytosis  - s/p 1 unit of PRBCs 11/6/23 and 11/7/23 with good response  - monitor and transfuse as needed    Hypokalemia   - replace per protocol    SCDs for dvt ppx   Full code.  Discussed with patient and nursing staff.  Anticipate discharge home with HH vs SNU facility in 2-3 days.      Kenton Meeks MD  [facility]  11/08/23  10:27 EST    Portions of this note have been copied and I have reviewed these. They are accurate as of 11/8/2023

## 2023-11-08 NOTE — SIGNIFICANT NOTE
11/08/23 1001   OTHER   Discipline physical therapist   Rehab Time/Intention   Session Not Performed patient/family declined, not feeling well  (pt politely declined PT stating she had a rough night and would like to rest; plans bronch today - will f/u tomorrow)   Therapy Assessment/Plan (PT)   Criteria for Skilled Interventions Met (PT) yes   Recommendation   PT - Next Appointment 11/09/23

## 2023-11-08 NOTE — PROGRESS NOTES
LOS: 6 days     Chief Complaint/ Reason for encounter: KARLI    Subjective   11/06/23 : Patient resting with family at bedside able provide some history  She is doing well overall no new complaints  Weight up today but she is not edematous, not short of breath  She is on 3 L oxygen which is near her home rate  Eating some, drinking well according to family    11/7: No new complaints, bronchoscopy with biopsy scheduled for today  Oral intake remains very low, no shortness of breath chest pain or edema    11/8: Events noted, patient developed a right pneumothorax after her bronchoscopy today.  Higher than normal bleeding during the procedure noted, CT-guided chest tube placed  She has been intermittently hypotensive, currently 89/48, O2 requirements about the same, 3 L      Medical history reviewed:  History of Present Illness    Subjective    History taken from: Patient and chart    Vital Signs  Temp:  [98.1 °F (36.7 °C)-98.4 °F (36.9 °C)] 98.2 °F (36.8 °C)  Heart Rate:  [] 101  Resp:  [16-24] 22  BP: ()/(36-63) 89/48       Wt Readings from Last 1 Encounters:   11/08/23 0428 67.7 kg (149 lb 4 oz)   11/07/23 0600 67.6 kg (149 lb 0.5 oz)   11/06/23 0605 67.6 kg (149 lb 0.5 oz)   11/05/23 0507 62.6 kg (138 lb)   11/04/23 0539 62.6 kg (138 lb)   11/03/23 0556 63 kg (138 lb 14.2 oz)       Objective:  Vital signs: (most recent): Blood pressure (!) 89/48, pulse 101, temperature 98.2 °F (36.8 °C), temperature source Oral, resp. rate 22, weight 67.7 kg (149 lb 4 oz), SpO2 91%.                Objective:  General Appearance:  Comfortable, well-appearing, in no acute distress and not in pain.  Awake, alert, oriented  HEENT: Mucous membranes moist, no injury, oropharynx clear  Lungs:  Normal effort and normal respiratory rate.  Breath sounds clear to auscultation.  No  respiratory distress.  No rales, decreased breath sounds or rhonchi.    Heart: Normal rate.  Regular rhythm.  S1, S2 normal.  No murmur.   Abdomen:  Abdomen is soft.  Bowel sounds are normal, no abdominal tenderness.  There is no rebound or guarding  Extremities: Trace edema of bilateral lower extremities  Neurological: No focal motor or sensory deficits, pupils reactive  Skin:  Warm and dry.  No rash or cyanosis.       Results Review:    Intake/Output:     Intake/Output Summary (Last 24 hours) at 11/8/2023 1448  Last data filed at 11/8/2023 1141  Gross per 24 hour   Intake 500 ml   Output 800 ml   Net -300 ml         DATA:  Radiology and Labs:  The following labs independently reviewed by me. Additional labs ordered for tomorrow a.m.  Interval notes, chart personally reviewed by me.   Old records independently reviewed showing normal baseline creatinine, history of CHF  Discussed with patient's family at bedside    Risk/ complexity of medical care/ medical decision making moderate risk, KARLI, fluid and electrolyte management    Labs:   Recent Results (from the past 24 hour(s))   Phosphorus    Collection Time: 11/07/23  4:16 PM    Specimen: Blood   Result Value Ref Range    Phosphorus 2.2 (L) 2.5 - 4.5 mg/dL   Renal Function Panel    Collection Time: 11/08/23  5:59 AM    Specimen: Blood   Result Value Ref Range    Glucose 96 65 - 99 mg/dL    BUN 7 (L) 8 - 23 mg/dL    Creatinine 0.72 0.57 - 1.00 mg/dL    Sodium 138 136 - 145 mmol/L    Potassium 3.4 (L) 3.5 - 5.2 mmol/L    Chloride 104 98 - 107 mmol/L    CO2 24.6 22.0 - 29.0 mmol/L    Calcium 8.3 (L) 8.6 - 10.5 mg/dL    Albumin 3.5 3.5 - 5.2 g/dL    Phosphorus 2.5 2.5 - 4.5 mg/dL    Anion Gap 9.4 5.0 - 15.0 mmol/L    BUN/Creatinine Ratio 9.7 7.0 - 25.0    eGFR 82.1 >60.0 mL/min/1.73   CBC Auto Differential    Collection Time: 11/08/23  5:59 AM    Specimen: Blood   Result Value Ref Range    WBC 46.36 (C) 3.40 - 10.80 10*3/mm3    RBC 3.20 (L) 3.77 - 5.28 10*6/mm3    Hemoglobin 9.6 (L) 12.0 - 15.9 g/dL    Hematocrit 28.8 (L) 34.0 - 46.6 %    MCV 90.0 79.0 - 97.0 fL    MCH 30.0 26.6 - 33.0 pg    MCHC 33.3 31.5 - 35.7  g/dL    RDW 16.3 (H) 12.3 - 15.4 %    RDW-SD 52.2 37.0 - 54.0 fl    MPV 11.0 6.0 - 12.0 fL    Platelets 240 140 - 450 10*3/mm3   Manual Differential    Collection Time: 11/08/23  5:59 AM    Specimen: Blood   Result Value Ref Range    Neutrophil % 88.0 (H) 42.7 - 76.0 %    Lymphocyte % 8.0 (L) 19.6 - 45.3 %    Monocyte % 2.0 (L) 5.0 - 12.0 %    Eosinophil % 2.0 0.3 - 6.2 %    Neutrophils Absolute 40.80 (H) 1.70 - 7.00 10*3/mm3    Lymphocytes Absolute 3.71 (H) 0.70 - 3.10 10*3/mm3    Monocytes Absolute 0.93 (H) 0.10 - 0.90 10*3/mm3    Eosinophils Absolute 0.93 (H) 0.00 - 0.40 10*3/mm3    RBC Morphology Normal Normal    WBC Morphology Normal Normal    Platelet Morphology Normal Normal   Prepare RBC, 1 Units    Collection Time: 11/08/23  6:00 AM   Result Value Ref Range    Product Code T8867K93     Unit Number [id number]     UNIT  ABO O     UNIT  RH POS     Crossmatch Interpretation Compatible     Dispense Status PT     Blood Expiration Date 202311162359     Blood Type Barcode 5100    Respiratory Panel PCR w/COVID-19(SARS-CoV-2) DAMIAN/ARIANA/JAGDEEP/PAD/COR/MAD/ADRIANA In-House, NP Swab in UTM/VTM, 3-4 HR TAT - Lavage, Lung, Right Upper Lobe    Collection Time: 11/08/23 11:04 AM    Specimen: Lung, Right Upper Lobe; Lavage   Result Value Ref Range    ADENOVIRUS, PCR Not Detected Not Detected    Coronavirus 229E Not Detected Not Detected    Coronavirus HKU1 Not Detected Not Detected    Coronavirus NL63 Not Detected Not Detected    Coronavirus OC43 Not Detected Not Detected    COVID19 Not Detected Not Detected - Ref. Range    Human Metapneumovirus Not Detected Not Detected    Human Rhinovirus/Enterovirus Not Detected Not Detected    Influenza A PCR Not Detected Not Detected    Influenza B PCR Not Detected Not Detected    Parainfluenza Virus 1 Not Detected Not Detected    Parainfluenza Virus 2 Not Detected Not Detected    Parainfluenza Virus 3 Not Detected Not Detected    Parainfluenza Virus 4 Not Detected Not Detected    RSV, PCR  Not Detected Not Detected    Bordetella pertussis pcr Not Detected Not Detected    Bordetella parapertussis PCR Not Detected Not Detected    Chlamydophila pneumoniae PCR Not Detected Not Detected    Mycoplasma pneumo by PCR Not Detected Not Detected       Radiology:  Pertinent radiology studies were reviewed as described above      Medications have been reviewed separately in chart overview      ASSESSMENT:  Acute kidney injury, prerenal, rapidly improving and near baseline  Chronic diastolic CHF  Nausea vomiting and dehydration, better  Left lingular lung mass  Myeloproliferative disorder  Chronic leukocytosis  Atrial fibrillation  Diarrhea, slowly resolving  Acute on chronic anemia  Diabetes mellitus  UTI on ertapenem, ESBL E. coli  Lung mass, biopsy planned      DISCUSSION/PLAN:   Renal function continues to improve and is near baseline today  Unfortunately she developed pneumothorax after her procedure today  Chest tube placed  Could not rule out pulmonary edema on chest x-ray but her blood pressure is currently only 80s over 40s so we will hold off on any diuretics unless she decompensates  Palliative care to meet with family today  Guarded prognosis  Follow-up a.m. labs     Continue to monitor electrolytes and volume closely      Darrell Hanley MD  [facility]   Office phone number: [phone]  Answering service phone number: [phone]    11/08/23  14:48 EST    Dictation performed using Dragon dictation software

## 2023-11-08 NOTE — NURSING NOTE
RN was informed by CTU that patient had a 19 beat run of Vtach.... Strip was saved.    RN notified Cardiology, MD aware[facility]

## 2023-11-08 NOTE — PLAN OF CARE
Goal Outcome Evaluation:  Plan of Care Reviewed With: patient condition is till the same. She is alert and oriented X4, awaiting bronchoscopy with lung biopsy today.         Progress: no change

## 2023-11-08 NOTE — ANESTHESIA PROCEDURE NOTES
Airway  Urgency: elective    Date/Time: 11/8/2023 10:50 AM  Airway not difficult    General Information and Staff    Patient location during procedure: OR  Anesthesiologist: Kevin Castellano MD  CRNA/CAA: Meg Chou CRNA    Indications and Patient Condition  Indications for airway management: airway protection    Preoxygenated: yes  Mask difficulty assessment: 0 - not attempted    Final Airway Details  Final airway type: supraglottic airway      Successful airway: Size 3     Number of attempts at approach: 1  Assessment: lips, teeth, and gum same as pre-op

## 2023-11-08 NOTE — PROGRESS NOTES
Dr. DELL Alford    [facility] ENDO SUITES        Patient ID:  Name:  Catherine Boyer  MRN:  [id number]  1940  83 y.o.  female            CC/Reason for visit: Pulmonary infiltrates, diffuse    Interval hx: We will proceed with endoscopy as planned.  The patient has a dry cough.  No hemoptysis.  No chest pain  She is very hard of hearing    ROS: No fever, no abdominal pain    Vitals:  Vitals:    11/08/23 1213 11/08/23 1219 11/08/23 1221 11/08/23 1226   BP: 94/48 (!) 62/36 (!) 80/52 (!) 76/60   BP Location: Right arm Right arm Right arm Right arm   Patient Position: Lying Sitting Sitting Lying   Pulse: 105 107 108 104   Resp: 22 22 22 22   Temp:       TempSrc:       SpO2: 92% 90% 93% 94%   Weight:               Body mass index is 25.8 kg/m².    Intake/Output Summary (Last 24 hours) at 11/8/2023 1235  Last data filed at 11/8/2023 1141  Gross per 24 hour   Intake 808.75 ml   Output 1000 ml   Net -191.25 ml       Exam:  GEN:  No distress  Alert, oriented x2, very hard of hearing  LUNGS: Clear breath sounds bilat, no use of accessory muscles  CV:  Normal S1S2, without murmur, no edema  ABD:  Non tender, no enlarged liver or masses      Scheduled meds:  ertapenem, 1,000 mg, Intravenous, Q24H  famotidine, 20 mg, Oral, Daily  latanoprost, 1 drop, Both Eyes, Nightly  metoprolol succinate XL, 25 mg, Oral, Daily  Momelotinib Dihydrochloride, 200 mg, Oral, Daily  potassium chloride, 10 mEq, Intravenous, Once  saccharomyces boulardii, 250 mg, Oral, BID  senna-docusate sodium, 2 tablet, Oral, BID  sertraline, 100 mg, Oral, Daily      IV meds:                      lactated ringers, 30 mL/hr, Last Rate: 30 mL/hr (11/08/23 1022)        Data Review:   I reviewed the patient's medications and new clinical results.            Results from last 7 days   Lab Units 11/08/23  0559 11/07/23  0506 11/06/23  2100 11/06/23  0611 11/05/23  0124 11/04/23  0626 11/03/23  0540 11/02/23  1631   SODIUM mmol/L 138 138  --  138   < > 139    < > 132*   POTASSIUM mmol/L 3.4* 3.5  --  3.5   < > 3.7   < > 4.2   CHLORIDE mmol/L 104 107  --  109*   < > 106   < > 98   CO2 mmol/L 24.6 23.6  --  20.9*   < > 19.0*   < > 20.0*   BUN mg/dL 7* 11  --  16   < > 42*   < > 55*   CREATININE mg/dL 0.72 0.88  --  0.94   < > 1.79*   < > 2.96*   CALCIUM mg/dL 8.3* 8.3*  --  8.1*   < > 8.2*   < > 8.5*   BILIRUBIN mg/dL  --   --   --   --   --   --   --  0.5   ALK PHOS U/L  --   --   --   --   --   --   --  115   ALT (SGPT) U/L  --   --   --   --   --   --   --  17   AST (SGOT) U/L  --   --   --   --   --   --   --  22   GLUCOSE mg/dL 96 97  --  95   < > 112*   < > 148*   WBC 10*3/mm3 46.36* 35.70*  --  41.43*  --   --    < > 44.77*   HEMOGLOBIN g/dL 9.6* 7.7* 8.1* 6.8*  --   --    < > 8.1*   PLATELETS 10*3/mm3 240 278  --  271  --   --    < > 522*   PROCALCITONIN ng/mL  --   --   --   --   --  0.09  --   --     < > = values in this interval not displayed.     Results from last 7 days   Lab Units 11/04/23  1603 11/03/23  1603   BLOODCX  No growth at 3 days  No growth at 3 days  --    URINECX   --  >100,000 CFU/mL Escherichia coli ESBL*         ASSESSMENT:   Bilateral pulmonary infiltrates  Lingula pleural-based opacity  Acute kidney injury  CAD (coronary artery disease)  Paroxysmal atrial fibrillation  Myeloproliferative disorder  Type 2 diabetes mellitus with circulatory disorder, without long-term current use of insulin  Hypertension   Personal history of transient ischemic attack (TIA), 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 deficits  Hypoxemia      PLAN:  The patient's hemoglobin is above 9 g/dL today.  We will proceed as planned with bronchoscopy and biopsies.  She has been off of anticoagulation and antiplatelets for 6 days.  We will give her 1 dose of 10 mill equivalents potassium chloride IV for her borderline low potassium.  She will need a follow-up chest x-ray downstairs, proper PA and lateral after bronchoscopy to rule out pneumothorax.  Since she is on  immunosuppression medication for her myelofibrosis, she is prone to opportunistic pulmonary infections.  Differential diagnosis for pulmonary infiltrates is extramedullary hematopoietic tissue.        Raoul Alford MD  11/8/2023

## 2023-11-08 NOTE — ANESTHESIA POSTPROCEDURE EVALUATION
Patient: Catherine Boyer    Procedure Summary       Date: 11/08/23 Room / Location: [facility] ENDOSCOPY 7 / [facility] ENDOSCOPY    Anesthesia Start: 1042 Anesthesia Stop: 1203    Procedure: BRONCHOSCOPY UNDER FLUORO WITH BAL,  BIOPSIES; ACETYLCYSTEIN 4ML GIVEN (Bilateral: Bronchus) Diagnosis:       Pulmonary infiltrate      (Pulmonary infiltrate [R91.8])    Surgeons: Raoul Alford MD Provider: Kevin Castellano MD    Anesthesia Type: MAC ASA Status: 4            Anesthesia Type: MAC    Vitals  Vitals Value Taken Time   BP 84/48 11/08/23 1210   Temp     Pulse 102 11/08/23 1220   Resp 24 11/08/23 1211   SpO2 94 % 11/08/23 1220   Vitals shown include unfiled device data.        Post Anesthesia Care and Evaluation    Patient location during evaluation: PACU  Patient participation: complete - patient participated  Level of consciousness: awake and alert  Pain management: adequate    Airway patency: patent  Anesthetic complications: No anesthetic complications    Cardiovascular status: acceptable  Respiratory status: acceptable  Hydration status: acceptable    Comments: --------------------            11/08/23               1211     --------------------   BP:                  Pulse:     109       Resp:       24       Temp:                SpO2:      96%      --------------------

## 2023-11-08 NOTE — NURSING NOTE
XR room called RN to report a critical imaging result. (See image results)    Dr. Meeks and Dr. Alford called and notified, both Mds aware.    Dr. Alford was given XR extension by RN and he is reaching out.     Awaiting update from MD.

## 2023-11-08 NOTE — PROGRESS NOTES
[facility]  Inpatient Progress Note    Reason for Follow Up: Anemia    Subjective     Interval History:   Hgb at 9.6 this morning.  Patient reports that she is feeling well today.  Says she has 1 nonbloody, brown/green formed bowel movement early this morning around 3 AM.  Denies abdominal pain, nausea, vomiting, hematemesis, coffee-ground emesis.    Current Facility-Administered Medications:     acetaminophen (TYLENOL) tablet 650 mg, 650 mg, Oral, Q4H PRN, Jaimee Macias MD    sennosides-docusate (PERICOLACE) 8.6-50 MG per tablet 2 tablet, 2 tablet, Oral, BID **AND** polyethylene glycol (MIRALAX) packet 17 g, 17 g, Oral, Daily PRN **AND** bisacodyl (DULCOLAX) EC tablet 5 mg, 5 mg, Oral, Daily PRN **AND** bisacodyl (DULCOLAX) suppository 10 mg, 10 mg, Rectal, Daily PRN, Jaimee Macias MD    ertapenem (INVanz) 1,000 mg in sodium chloride 0.9 % 100 mL IVPB-VTB, 1,000 mg, Intravenous, Q24H, Kenton Meeks MD, Last Rate: 200 mL/hr at 11/07/23 1125, 1,000 mg at 11/07/23 1125    famotidine (PEPCID) tablet 20 mg, 20 mg, Oral, Daily, Jaimee Macias MD, 20 mg at 11/06/23 1017    HYDROcodone-acetaminophen (NORCO) 5-325 MG per tablet 1 tablet, 1 tablet, Oral, Q6H PRN, Junior Doan MD, 1 tablet at 11/04/23 1932    latanoprost (XALATAN) 0.005 % ophthalmic solution 1 drop, 1 drop, Both Eyes, Nightly, Jaimee Macias MD, 1 drop at 11/07/23 2100    melatonin tablet 3 mg, 3 mg, Oral, Nightly PRN, Jaimee Macias MD, 3 mg at 11/07/23 2100    metoprolol succinate XL (TOPROL-XL) 24 hr tablet 25 mg, 25 mg, Oral, Daily, Jaimee Macias MD, 25 mg at 11/06/23 1017    Momelotinib Dihydrochloride (OJJAARA) 200 mg, 200 mg, Oral, Daily, Stingl, Jaimee Mckeon MD, 200 mg at 11/06/23 1021    morphine injection 2 mg, 2 mg, Intravenous, Q3H PRN, Dexter Reynolds, APRN    nitroglycerin (NITROSTAT) SL tablet 0.4 mg, 0.4 mg, Sublingual, Q5 Min PRN, Jennie Faye,  MD, 0.4 mg at 11/05/23 2157    ondansetron (ZOFRAN) tablet 4 mg, 4 mg, Oral, Q6H PRN **OR** ondansetron (ZOFRAN) injection 4 mg, 4 mg, Intravenous, Q6H PRN, Jaimee Macias MD, 4 mg at 11/06/23 1156    Phosphorus Replacement - Follow Nurse / BPA Driven Protocol, , Does not apply, PRN, Junior Doan MD    saccharomyces boulardii (FLORASTOR) capsule 250 mg, 250 mg, Oral, BID, Jaimee Macias MD, 250 mg at 11/07/23 2100    sertraline (ZOLOFT) tablet 100 mg, 100 mg, Oral, Daily, Jaimee Macias MD, 100 mg at 11/06/23 1017    simethicone (MYLICON) chewable tablet 80 mg, 80 mg, Oral, 4x Daily PRN, Dexter Reynolds, APRN, 80 mg at 11/06/23 1157    Insert Peripheral IV, , , Once **AND** sodium chloride 0.9 % flush 10 mL, 10 mL, Intravenous, PRN, Tommy Clemente MD      Objective     Vital Signs  Temp:  [97.9 °F (36.6 °C)-98.5 °F (36.9 °C)] 98.1 °F (36.7 °C)  Heart Rate:  [] 115  Resp:  [16] 16  BP: (109-132)/(48-63) 114/58  Body mass index is 25.8 kg/m².    Intake/Output Summary (Last 24 hours) at 11/8/2023 0742  Last data filed at 11/8/2023 0428  Gross per 24 hour   Intake 308.75 ml   Output 1550 ml   Net -1241.25 ml     No intake/output data recorded.     Physical Exam:   General: patient awake, alert and cooperative   Eyes: Normal lids and lashes, no scleral icterus   Cardiovascular: regular rhythm and rate   Pulm: regular and unlabored   Abdomen: soft, nontender, nondistended; normal bowel sounds     Results Review:     I reviewed the patient's new clinical results.    Results from last 7 days   Lab Units 11/07/23  0506 11/06/23  2100 11/06/23  0611 11/03/23  0540   WBC 10*3/mm3 35.70*  --  41.43* 39.23*   HEMOGLOBIN g/dL 7.7* 8.1* 6.8* 7.6*   HEMATOCRIT % 24.2* 25.5* 21.0* 24.1*   PLATELETS 10*3/mm3 278  --  271 413     Results from last 7 days   Lab Units 11/08/23  0559 11/07/23  0506 11/06/23  0611 11/03/23  0540 11/02/23  1631   SODIUM mmol/L 138 138 138   < > 132*   POTASSIUM mmol/L  3.4* 3.5 3.5   < > 4.2   CHLORIDE mmol/L 104 107 109*   < > 98   CO2 mmol/L 24.6 23.6 20.9*   < > 20.0*   BUN mg/dL 7* 11 16   < > 55*   CREATININE mg/dL 0.72 0.88 0.94   < > 2.96*   CALCIUM mg/dL 8.3* 8.3* 8.1*   < > 8.5*   BILIRUBIN mg/dL  --   --   --   --  0.5   ALK PHOS U/L  --   --   --   --  115   ALT (SGPT) U/L  --   --   --   --  17   AST (SGOT) U/L  --   --   --   --  22   GLUCOSE mg/dL 96 97 95   < > 148*    < > = values in this interval not displayed.         Lab Results   Lab Value Date/Time    LIPASE 34 11/02/2023 1631    LIPASE 17 08/13/2023 1023    LIPASE 16 05/03/2023 1534    LIPASE 14 02/17/2023 1130    LIPASE 10 (L) 02/04/2023 1419    LIPASE 13 01/23/2023 0216    LIPASE 20 11/21/2022 0250    LIPASE 38 08/31/2022 2131    LIPASE 16.0 02/09/2022 0525    LIPASE 50 06/29/2021 1131     Radiology:  CT Chest Without Contrast Diagnostic   Final Result       Redemonstration of pleural-based density in the lingula, showing   increased extension medially, representing a change from the chest CT   from 5/7/2023, may be rounded atelectasis, pneumonia, or neoplasm;   correlate clinically, PET/CT correlation can be obtained as indicated,   in addition to continued CT follow-up. Since the recent abdomen/pelvis   CT, consolidation has developed anteriorly laterally in the left lower   lobe suspicious for pneumonia. Fairly extensive groundglass and   consolidative opacities are present throughout both lungs, especially   mid to upper lungs that may represent edema and/or atypical pneumonia               This report was finalized on 11/4/2023 9:17 AM by Dr. Reilly Ritter M.D on Workstation: [facility]          CT Abdomen Pelvis Without Contrast   Final Result           1. A new indeterminate density in the lingula, as described.   Reticulonodular pulmonary opacities in the bilateral lower lobes.   Follow-up evaluation advised.       2. Colonic diverticulosis. No acute inflammatory process of bowel is    identified. Follow-up as indications persist.       3. No urolithiasis or hydronephrosis. Small arterial calcification of   the left renal hilum.       This report was finalized on 11/2/2023 7:22 PM by Dr. Reilly Ritter M.D on Workstation: [id number]              Assessment & Plan     Active Hospital Problems    Diagnosis     **Acute kidney injury     Pulmonary infiltrate     Myelofibrosis     Personal history of transient ischemic attack (TIA), 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 deficits     Hypertension     Type 2 diabetes mellitus with circulatory disorder, without long-term current use of insulin     Paroxysmal atrial fibrillation     CAD (coronary artery disease)     Myeloproliferative disorder        Assessment:  Diarrhea - GI PCR and C Diff (-)  Acute on chronic anemia  Myeloproliferative disorder  A-fib on Eliquis  Status post cholecystectomy  Status post TIA      Plan:  Continue to monitor hemoglobin.  Transfuse as needed per primary team. Patient is not having any signs of overt GI bleeding and hemoglobin stable today so no plan for scopes at this time.  Can reevaluate this if she does have signs of overt GI bleeding.  Continue supportive care per primary  Continue famotidine 20 mg daily  Nursing team to notify GI service on call if any change in clinical status  Recommend outpatient follow-up for diarrhea to discuss pelvic floor evaluation versus anorectal manometry testing.  Patient may also benefit from fiber supplementation.    At this time GI will sign off. Please do not hesitate to call back if needed.     Further plans depending on clinical course and per attending, Dr. Annika Garibay PA-C

## 2023-11-08 NOTE — PROGRESS NOTES
Dr. DELL Alford    [facility] ENDO SUITES        Patient ID:  Name:  Catherine Boyer  MRN:  [id number]  1940  83 y.o.  female            CC/Reason for visit: Iatrogenic right pneumothorax    Interval hx: Patient is alert.  Oxygenating well on 3 L nasal cannula.  Complaining of some chest soreness and shortness of breath.  Discussed x-ray results with radiologist.  Patient has developed a right pneumothorax and atelectasis left lung base.  She is also developed hypotension.    ROS: Some right chest pain.  Some shortness of breath.  No abdominal pain    I reviewed old medical records.  Past medical history, social history and family history: Unchanged from admission H&P.      Vitals:  Vitals:    11/08/23 1249 11/08/23 1258 11/08/23 1312 11/08/23 1427   BP: (!) 80/54 92/43 91/49 (!) 89/48   BP Location: Right arm Right arm Right arm Right arm   Patient Position: Lying Lying Lying Lying   Pulse: 98 101 102 101   Resp: 22 22 22    Temp:       TempSrc:       SpO2: 93% 94% 93% 91%   Weight:               Body mass index is 25.8 kg/m².    Intake/Output Summary (Last 24 hours) at 11/8/2023 1430  Last data filed at 11/8/2023 1141  Gross per 24 hour   Intake 500 ml   Output 800 ml   Net -300 ml       Exam:  GEN:  No distress  Alert, oriented x 3.   LUNGS: Clear breath sounds bilat, no use of accessory muscles  CV:  Normal S1S2, without murmur, no edema  ABD:  Non tender, no enlarged liver or masses      Scheduled meds:  ertapenem, 1,000 mg, Intravenous, Q24H  famotidine, 20 mg, Oral, Daily  lactated ringers, 500 mL, Intravenous, Once  latanoprost, 1 drop, Both Eyes, Nightly  metoprolol succinate XL, 25 mg, Oral, Daily  Momelotinib Dihydrochloride, 200 mg, Oral, Daily  potassium chloride, 10 mEq, Intravenous, Once  saccharomyces boulardii, 250 mg, Oral, BID  senna-docusate sodium, 2 tablet, Oral, BID  sertraline, 100 mg, Oral, Daily      IV meds:                      lactated ringers, 30 mL/hr, Last Rate: 30  mL/hr (11/08/23 1022)        Data Review:   I reviewed the patient's medications and new clinical results.          Lab Results   Component Value Date    CALCIUM 8.3 (L) 11/08/2023    PHOS 2.5 11/08/2023    MG 1.9 11/05/2023    MG 1.8 09/27/2023    MG 2.0 08/28/2023     Results from last 7 days   Lab Units 11/08/23  0559 11/07/23  0506 11/06/23  2100 11/06/23  0611 11/05/23  0124 11/04/23  0626 11/03/23  0540 11/02/23  1631   SODIUM mmol/L 138 138  --  138   < > 139   < > 132*   POTASSIUM mmol/L 3.4* 3.5  --  3.5   < > 3.7   < > 4.2   CHLORIDE mmol/L 104 107  --  109*   < > 106   < > 98   CO2 mmol/L 24.6 23.6  --  20.9*   < > 19.0*   < > 20.0*   BUN mg/dL 7* 11  --  16   < > 42*   < > 55*   CREATININE mg/dL 0.72 0.88  --  0.94   < > 1.79*   < > 2.96*   CALCIUM mg/dL 8.3* 8.3*  --  8.1*   < > 8.2*   < > 8.5*   BILIRUBIN mg/dL  --   --   --   --   --   --   --  0.5   ALK PHOS U/L  --   --   --   --   --   --   --  115   ALT (SGPT) U/L  --   --   --   --   --   --   --  17   AST (SGOT) U/L  --   --   --   --   --   --   --  22   GLUCOSE mg/dL 96 97  --  95   < > 112*   < > 148*   WBC 10*3/mm3 46.36* 35.70*  --  41.43*  --   --    < > 44.77*   HEMOGLOBIN g/dL 9.6* 7.7* 8.1* 6.8*  --   --    < > 8.1*   PLATELETS 10*3/mm3 240 278  --  271  --   --    < > 522*   PROCALCITONIN ng/mL  --   --   --   --   --  0.09  --   --     < > = values in this interval not displayed.     Results from last 7 days   Lab Units 11/04/23  1603 11/03/23  1603   BLOODCX  No growth at 3 days  No growth at 3 days  --    URINECX   --  >100,000 CFU/mL Escherichia coli ESBL*           ASSESSMENT:   Right pneumothorax, iatrogenic after bronchoscopic biopsy  Bilateral pulmonary infiltrates  Hypotension  Lingula pleural-based opacity  Acute kidney injury  CAD (coronary artery disease)  Paroxysmal atrial fibrillation  Myeloproliferative disorder  Type 2 diabetes mellitus with circulatory disorder, without long-term current use of insulin  Hypertension    Personal history of transient ischemic attack (TIA), 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 deficits  Hypoxemia      PLAN:  Unfortunately the patient developed post bronchoscopy complications.  She developed pneumothorax.  She had more than usual bleeding during the procedure which obligated me to stop after 3 biopsy samples.  I discussed the case with interventional radiologist.  They will place a CT-guided small bore pigtail chest tube to evacuate the post bronchoscopy iatrogenic right pneumothorax.  I will be connected to [facility] waterseal and continues negative wall suction.  She has some atelectasis left base.  I will start her on a flutter valve.  We will check hemoglobin tonight since she is known to have significant anemia and required transfusion twice over the past 2 days prior to bronchoscopy procedure.  Continue to follow microbiology results of her bronchoalveolar lavage.  Continue to follow biopsy results once pathology report is available.  This patient has several chronic medical conditions, and now several acute medical illnesses.  Extensive amount of data was reviewed.  Images were directly visualized by me.  This patient warrants high complexity medical decision-making.          Raoul Alford MD  11/8/2023

## 2023-11-08 NOTE — NURSING NOTE
Family member taken to Radiology Triage for purpose of consenting to Chest Tube placement.     MD ordered transfer to 5E.     Patient remains off unit, will call report when bed is ready.

## 2023-11-08 NOTE — PLAN OF CARE
Problem: Adult Inpatient Plan of Care  Goal: Plan of Care Review  Outcome: Ongoing, Progressing  Goal: Patient-Specific Goal (Individualized)  Outcome: Ongoing, Progressing  Goal: Absence of Hospital-Acquired Illness or Injury  Outcome: Ongoing, Progressing  Intervention: Identify and Manage Fall Risk  Recent Flowsheet Documentation  Taken 11/8/2023 1630 by Ismael Lowery RN  Safety Promotion/Fall Prevention:   room organization consistent   safety round/check completed  Taken 11/8/2023 1455 by Ismael Lowery RN  Safety Promotion/Fall Prevention: patient off unit  Taken 11/8/2023 1400 by Ismael Lowery RN  Safety Promotion/Fall Prevention: patient off unit  Taken 11/8/2023 1200 by Ismael Lowery RN  Safety Promotion/Fall Prevention: patient off unit  Taken 11/8/2023 0800 by Ismael Lowery RN  Safety Promotion/Fall Prevention:   safety round/check completed   room organization consistent  Intervention: Prevent Skin Injury  Recent Flowsheet Documentation  Taken 11/8/2023 1630 by Ismael Lowery RN  Body Position: position changed independently  Taken 11/8/2023 0800 by Ismael Lowery RN  Body Position: position changed independently  Intervention: Prevent Infection  Recent Flowsheet Documentation  Taken 11/8/2023 1630 by Ismael Lowery RN  Infection Prevention:   single patient room provided   rest/sleep promoted  Taken 11/8/2023 0800 by Ismael Lowery RN  Infection Prevention:   single patient room provided   rest/sleep promoted  Goal: Optimal Comfort and Wellbeing  Outcome: Ongoing, Progressing  Goal: Readiness for Transition of Care  Outcome: Ongoing, Progressing     Problem: Adjustment to Illness (Sepsis/Septic Shock)  Goal: Optimal Coping  Outcome: Ongoing, Progressing     Problem: Bleeding (Sepsis/Septic Shock)  Goal: Absence of Bleeding  Outcome: Ongoing, Progressing     Problem: Glycemic Control Impaired (Sepsis/Septic Shock)  Goal: Blood Glucose Level Within Desired Range  Outcome: Ongoing,  Progressing     Problem: Infection Progression (Sepsis/Septic Shock)  Goal: Absence of Infection Signs and Symptoms  Outcome: Ongoing, Progressing  Intervention: Initiate Sepsis Management  Recent Flowsheet Documentation  Taken 11/8/2023 1630 by Ismael Lowery RN  Infection Prevention:   single patient room provided   rest/sleep promoted  Isolation Precautions:   precautions maintained   contact  Taken 11/8/2023 0800 by Ismael Lowery RN  Infection Prevention:   single patient room provided   rest/sleep promoted  Isolation Precautions:   precautions maintained   contact     Problem: Nutrition Impaired (Sepsis/Septic Shock)  Goal: Optimal Nutrition Intake  Outcome: Ongoing, Progressing     Problem: Fall Injury Risk  Goal: Absence of Fall and Fall-Related Injury  Outcome: Ongoing, Progressing  Intervention: Identify and Manage Contributors  Recent Flowsheet Documentation  Taken 11/8/2023 1630 by Ismael Lowery RN  Medication Review/Management: medications reviewed  Taken 11/8/2023 0800 by Ismael Lowery RN  Medication Review/Management: medications reviewed  Intervention: Promote Injury-Free Environment  Recent Flowsheet Documentation  Taken 11/8/2023 1630 by Ismael Lowery RN  Safety Promotion/Fall Prevention:   room organization consistent   safety round/check completed  Taken 11/8/2023 1455 by Ismael Lowery RN  Safety Promotion/Fall Prevention: patient off unit  Taken 11/8/2023 1400 by Ismael Lowery RN  Safety Promotion/Fall Prevention: patient off unit  Taken 11/8/2023 1200 by Ismael Lowery RN  Safety Promotion/Fall Prevention: patient off unit  Taken 11/8/2023 0800 by Ismael Lowery RN  Safety Promotion/Fall Prevention:   safety round/check completed   room organization consistent     Problem: Fluid and Electrolyte Imbalance (Acute Kidney Injury/Impairment)  Goal: Fluid and Electrolyte Balance  Outcome: Ongoing, Progressing     Problem: Oral Intake Inadequate (Acute Kidney Injury/Impairment)  Goal:  Optimal Nutrition Intake  Outcome: Ongoing, Progressing     Problem: Renal Function Impairment (Acute Kidney Injury/Impairment)  Goal: Effective Renal Function  Outcome: Ongoing, Progressing  Intervention: Monitor and Support Renal Function  Recent Flowsheet Documentation  Taken 11/8/2023 1630 by Ismael Lowery RN  Medication Review/Management: medications reviewed  Taken 11/8/2023 0800 by Ismael Lowery RN  Medication Review/Management: medications reviewed     Problem: Skin Injury Risk Increased  Goal: Skin Health and Integrity  Outcome: Ongoing, Progressing  Intervention: Optimize Skin Protection  Recent Flowsheet Documentation  Taken 11/8/2023 1630 by Ismael Lowery, RN  Head of Bed (HOB) Positioning: HOB at 20-30 degrees  Taken 11/8/2023 0800 by Ismael Lowery RN  Head of Bed (HOB) Positioning: HOB at 30 degrees   Goal Outcome Evaluation:

## 2023-11-08 NOTE — PROGRESS NOTES
HOSPITAL FOLLOW UP NOTE    Patient Name: Catherine Boyer  Patient : 1940        Date of Service:23  Provider of Service: ABEL Dolan  Place of Service: [facility]  Referral Provider: Jaimee Macias, *          Follow Up:  chest pain    Interval Hx: VSS, good UOP, no further chest pain, received another unit of PRBC yesterday      OBJECTIVE  Temp:  [97.9 °F (36.6 °C)-98.5 °F (36.9 °C)] 98.1 °F (36.7 °C)  Heart Rate:  [] 115  Resp:  [16] 16  BP: (109-132)/(48-63) 114/58     Intake/Output Summary (Last 24 hours) at 2023 08  Last data filed at 2023 0428  Gross per 24 hour   Intake 308.75 ml   Output 1400 ml   Net -1091.25 ml     Body mass index is 25.8 kg/m².      23  0605 23  0600 23  0428   Weight: 67.6 kg (149 lb 0.5 oz) 67.6 kg (149 lb 0.5 oz) 67.7 kg (149 lb 4 oz)         Physical Exam:     General Appearance:    Alert, cooperative, in no acute distress, resting   Head:    Normocephalic, without obvious abnormality, atraumatic   Eyes:            Conjunctivae and sclerae normal, no   icterus, no pallor, corneas clear, PERRLA   Neck:   No adenopathy, supple, trachea midline, no thyromegaly, no   carotid bruit, no JVD   Lungs:    Diminished latesha bases, respirations regular, even and unlabored, on 3.5L NaO2    Heart:    Regular rhythm and normal rate, normal S1 and S2, no murmur, no gallop, no rub, no click   Chest Wall:    No abnormalities observed   Abdomen:     Normal bowel sounds, no masses, no organomegaly, soft, nontender, nondistended, no guarding, no rebound  tenderness   Extremities:   Moves all extremities well, no edema, no cyanosis, no redness   Pulses:   Pulses palpable and equal bilaterally.          CURRENT MEDS    Scheduled Meds:ertapenem, 1,000 mg, Intravenous, Q24H  famotidine, 20 mg, Oral, Daily  latanoprost, 1 drop, Both Eyes, Nightly  metoprolol succinate XL, 25 mg, Oral, Daily  Momelotinib Dihydrochloride, 200  mg, Oral, Daily  saccharomyces boulardii, 250 mg, Oral, BID  senna-docusate sodium, 2 tablet, Oral, BID  sertraline, 100 mg, Oral, Daily      Continuous Infusions:       Lab Review:   Results from last 7 days   Lab Units 11/08/23  0559 11/07/23  0506 11/03/23  0540 11/02/23  1631   SODIUM mmol/L 138 138   < > 132*   POTASSIUM mmol/L 3.4* 3.5   < > 4.2   CHLORIDE mmol/L 104 107   < > 98   CO2 mmol/L 24.6 23.6   < > 20.0*   BUN mg/dL 7* 11   < > 55*   CREATININE mg/dL 0.72 0.88   < > 2.96*   GLUCOSE mg/dL 96 97   < > 148*   CALCIUM mg/dL 8.3* 8.3*   < > 8.5*   AST (SGOT) U/L  --   --   --  22   ALT (SGPT) U/L  --   --   --  17    < > = values in this interval not displayed.         Results from last 7 days   Lab Units 11/08/23  0559 11/07/23  0506   WBC 10*3/mm3 46.36* 35.70*   HEMOGLOBIN g/dL 9.6* 7.7*   HEMATOCRIT % 28.8* 24.2*   PLATELETS 10*3/mm3 240 278         Results from last 7 days   Lab Units 11/05/23  0124   MAGNESIUM mg/dL 1.9     2D Echocardiogram 05/06/2023:    Left ventricular systolic function is low normal. Left ventricular ejection fraction appears to be 51 - 55%.    Left ventricular wall thickness is consistent with mild septal asymmetric hypertrophy.    The left atrial cavity is moderately dilated.    Mild aortic valve stenosis is present.    Peak velocity of the flow distal to the aortic valve is 202 cm/s. Aortic valve maximum pressure gradient is 16 mmHg. Aortic valve mean pressure gradient is 9 mmHg. Aortic valve dimensionless index is 0.8 .    Estimated right ventricular systolic pressure from tricuspid regurgitation is markedly elevated (>55 mmHg). Calculated right ventricular systolic pressure from tricuspid regurgitation is 62 mmHg.    Severe pulmonary hypertension is present.    Mild dilation of the ascending aorta is present. 3.7 cm.    Mild to moderate mitral regurgitation is noted.    ASSESSMENT & PLAN    Acute kidney injury    CAD (coronary artery disease)    Paroxysmal atrial  fibrillation    Myeloproliferative disorder    Type 2 diabetes mellitus with circulatory disorder, without long-term current use of insulin    Hypertension    Personal history of transient ischemic attack (TIA), 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 deficits    Myelofibrosis    Pulmonary infiltrate    1.  Chest pain: In setting of severe anemia.  No further chest pain/ discomfort  2.  Myeloproliferative disorder  3.  Paroxysmal atrial fibrillation: SR with PACs on tele.  On beta blocker.  Eliquis on hold at this time  4.  Severe anemia:Received 2 units PRBCs over last 2 days.  Hb 9.6 this am.  Eliquis and Plavix being held  5.  Coronary artery disease: Status post PCI to LAD and diagonal in February 2022, status post repeat stenting to diagonal secondary to in-stent restenosis September 2022, known  of RCA.    6.  Severe pulmonary hypertension : RVSP 62mm Hg, mild TR  7.  NSTEMI: Type II secondary to supply demand mismatch as evidenced by severe anemia  8.  Bilateral pulmonary infiltrates: bronchoscopy and lung bx planned for today (cancelled yesterday 2/2 low Hb)  9.  Valvular disease: mild aortic stenosis, mild to moderate mitral regurgitation,, mild tricuspid regurgitation      Sherry Olmedo, APRN  11/08/23

## 2023-11-09 PROBLEM — J95.811 IATROGENIC PNEUMOTHORAX: Status: ACTIVE | Noted: 2023-11-09

## 2023-11-09 LAB
ALBUMIN SERPL-MCNC: 3.4 G/DL (ref 3.5–5.2)
ANION GAP SERPL CALCULATED.3IONS-SCNC: 9 MMOL/L (ref 5–15)
BACTERIA SPEC AEROBE CULT: NORMAL
BACTERIA SPEC AEROBE CULT: NORMAL
BUN SERPL-MCNC: 17 MG/DL (ref 8–23)
BUN/CREAT SERPL: 15.9 (ref 7–25)
C-ANCA TITR SER IF: NORMAL TITER
CALCIUM SPEC-SCNC: 8.3 MG/DL (ref 8.6–10.5)
CENTROMERE B AB SER-ACNC: <0.2 AI (ref 0–0.9)
CHLORIDE SERPL-SCNC: 107 MMOL/L (ref 98–107)
CHROMATIN AB SERPL-ACNC: <0.2 AI (ref 0–0.9)
CO2 SERPL-SCNC: 22 MMOL/L (ref 22–29)
CREAT SERPL-MCNC: 1.07 MG/DL (ref 0.57–1)
DSDNA AB SER-ACNC: <1 IU/ML (ref 0–9)
EGFRCR SERPLBLD CKD-EPI 2021: 51 ML/MIN/1.73
ENA JO1 AB SER-ACNC: <0.2 AI (ref 0–0.9)
ENA RNP AB SER-ACNC: <0.2 AI (ref 0–0.9)
ENA SCL70 AB SER-ACNC: <0.2 AI (ref 0–0.9)
ENA SM AB SER-ACNC: <0.2 AI (ref 0–0.9)
ENA SS-A AB SER-ACNC: <0.2 AI (ref 0–0.9)
ENA SS-B AB SER-ACNC: <0.2 AI (ref 0–0.9)
GEN 5 2HR TROPONIN T REFLEX: 32 NG/L
GLUCOSE SERPL-MCNC: 172 MG/DL (ref 65–99)
HCT VFR BLD AUTO: 25.1 % (ref 34–46.6)
HGB BLD-MCNC: 7.8 G/DL (ref 12–15.9)
Lab: NORMAL
MAGNESIUM SERPL-MCNC: 1.6 MG/DL (ref 1.6–2.4)
MYELOPEROXIDASE AB SER IA-ACNC: <0.2 UNITS (ref 0–0.9)
P-ANCA ATYPICAL TITR SER IF: NORMAL TITER
P-ANCA TITR SER IF: NORMAL TITER
PHOSPHATE SERPL-MCNC: 4 MG/DL (ref 2.5–4.5)
POTASSIUM SERPL-SCNC: 4.3 MMOL/L (ref 3.5–5.2)
PROTEINASE3 AB SER IA-ACNC: <0.2 UNITS (ref 0–0.9)
SODIUM SERPL-SCNC: 138 MMOL/L (ref 136–145)
TROPONIN T DELTA: 4 NG/L
TROPONIN T SERPL HS-MCNC: 28 NG/L

## 2023-11-09 PROCEDURE — 84484 ASSAY OF TROPONIN QUANT: CPT | Performed by: NURSE PRACTITIONER

## 2023-11-09 PROCEDURE — 85018 HEMOGLOBIN: CPT | Performed by: INTERNAL MEDICINE

## 2023-11-09 PROCEDURE — 99232 SBSQ HOSP IP/OBS MODERATE 35: CPT | Performed by: NURSE PRACTITIONER

## 2023-11-09 PROCEDURE — 94761 N-INVAS EAR/PLS OXIMETRY MLT: CPT

## 2023-11-09 PROCEDURE — 94799 UNLISTED PULMONARY SVC/PX: CPT

## 2023-11-09 PROCEDURE — 85014 HEMATOCRIT: CPT | Performed by: INTERNAL MEDICINE

## 2023-11-09 PROCEDURE — 25010000002 METHYLPREDNISOLONE PER 40 MG: Performed by: INTERNAL MEDICINE

## 2023-11-09 PROCEDURE — 83735 ASSAY OF MAGNESIUM: CPT | Performed by: NURSE PRACTITIONER

## 2023-11-09 PROCEDURE — 80069 RENAL FUNCTION PANEL: CPT | Performed by: INTERNAL MEDICINE

## 2023-11-09 PROCEDURE — 25010000002 ERTAPENEM PER 500 MG: Performed by: INTERNAL MEDICINE

## 2023-11-09 PROCEDURE — 99231 SBSQ HOSP IP/OBS SF/LOW 25: CPT | Performed by: NURSE PRACTITIONER

## 2023-11-09 RX ORDER — IPRATROPIUM BROMIDE AND ALBUTEROL SULFATE 2.5; .5 MG/3ML; MG/3ML
3 SOLUTION RESPIRATORY (INHALATION)
Status: DISCONTINUED | OUTPATIENT
Start: 2023-11-09 | End: 2023-11-09

## 2023-11-09 RX ORDER — IPRATROPIUM BROMIDE AND ALBUTEROL SULFATE 2.5; .5 MG/3ML; MG/3ML
3 SOLUTION RESPIRATORY (INHALATION)
Status: DISCONTINUED | OUTPATIENT
Start: 2023-11-09 | End: 2023-11-19

## 2023-11-09 RX ORDER — IPRATROPIUM BROMIDE AND ALBUTEROL SULFATE 2.5; .5 MG/3ML; MG/3ML
3 SOLUTION RESPIRATORY (INHALATION) ONCE
Status: COMPLETED | OUTPATIENT
Start: 2023-11-09 | End: 2023-11-09

## 2023-11-09 RX ORDER — METHYLPREDNISOLONE SODIUM SUCCINATE 40 MG/ML
40 INJECTION, POWDER, LYOPHILIZED, FOR SOLUTION INTRAMUSCULAR; INTRAVENOUS EVERY 12 HOURS
Status: DISCONTINUED | OUTPATIENT
Start: 2023-11-09 | End: 2023-11-16

## 2023-11-09 RX ADMIN — DOCUSATE SODIUM 50MG AND SENNOSIDES 8.6MG 2 TABLET: 8.6; 5 TABLET, FILM COATED ORAL at 20:11

## 2023-11-09 RX ADMIN — Medication 250 MG: at 20:11

## 2023-11-09 RX ADMIN — METOPROLOL SUCCINATE 25 MG: 25 TABLET, EXTENDED RELEASE ORAL at 09:21

## 2023-11-09 RX ADMIN — FAMOTIDINE 20 MG: 20 TABLET ORAL at 09:20

## 2023-11-09 RX ADMIN — DOCUSATE SODIUM 50MG AND SENNOSIDES 8.6MG 2 TABLET: 8.6; 5 TABLET, FILM COATED ORAL at 09:20

## 2023-11-09 RX ADMIN — METHYLPREDNISOLONE SODIUM SUCCINATE 40 MG: 40 INJECTION, POWDER, LYOPHILIZED, FOR SOLUTION INTRAMUSCULAR; INTRAVENOUS at 17:46

## 2023-11-09 RX ADMIN — Medication 250 MG: at 09:20

## 2023-11-09 RX ADMIN — SERTRALINE 100 MG: 100 TABLET, FILM COATED ORAL at 09:28

## 2023-11-09 RX ADMIN — ERTAPENEM SODIUM 1000 MG: 1 INJECTION, POWDER, LYOPHILIZED, FOR SOLUTION INTRAMUSCULAR; INTRAVENOUS at 10:37

## 2023-11-09 RX ADMIN — IPRATROPIUM BROMIDE AND ALBUTEROL SULFATE 3 ML: 2.5; .5 SOLUTION RESPIRATORY (INHALATION) at 15:58

## 2023-11-09 RX ADMIN — Medication 3 MG: at 20:11

## 2023-11-09 RX ADMIN — IPRATROPIUM BROMIDE AND ALBUTEROL SULFATE 3 ML: 2.5; .5 SOLUTION RESPIRATORY (INHALATION) at 22:20

## 2023-11-09 RX ADMIN — LATANOPROST 1 DROP: 50 SOLUTION/ DROPS OPHTHALMIC at 20:12

## 2023-11-09 NOTE — PROGRESS NOTES
LOS: 7 days     Chief Complaint/ Reason for encounter: KARLI    Subjective   11/06/23 : Patient resting with family at bedside able provide some history  She is doing well overall no new complaints  Weight up today but she is not edematous, not short of breath  She is on 3 L oxygen which is near her home rate  Eating some, drinking well according to family    11/7: No new complaints, bronchoscopy with biopsy scheduled for today  Oral intake remains very low, no shortness of breath chest pain or edema    11/8: Events noted, patient developed a right pneumothorax after her bronchoscopy today.  Higher than normal bleeding during the procedure noted, CT-guided chest tube fortunately not needed  She has been intermittently hypotensive, currently 89/48, O2 requirements about the same, 3 L    11/9: She doing a little better today, fortunately did not need a chest tube  Oxygen requirements near baseline of around 3 L      Medical history reviewed:  History of Present Illness    Subjective    History taken from: Patient and chart    Vital Signs  Temp:  [97.3 °F (36.3 °C)-99 °F (37.2 °C)] 99 °F (37.2 °C)  Heart Rate:  [] 94  Resp:  [18-24] 18  BP: ()/(42-74) 110/70       Wt Readings from Last 1 Encounters:   11/09/23 0600 67.7 kg (149 lb 4 oz)   11/08/23 0428 67.7 kg (149 lb 4 oz)   11/07/23 0600 67.6 kg (149 lb 0.5 oz)   11/06/23 0605 67.6 kg (149 lb 0.5 oz)   11/05/23 0507 62.6 kg (138 lb)   11/04/23 0539 62.6 kg (138 lb)   11/03/23 0556 63 kg (138 lb 14.2 oz)       Objective:  Vital signs: (most recent): Blood pressure 110/70, pulse 94, temperature 99 °F (37.2 °C), temperature source Oral, resp. rate 18, weight 67.7 kg (149 lb 4 oz), SpO2 93%.                Objective:  General Appearance:  Comfortable, well-appearing, in no acute distress and not in pain.  Awake, alert, oriented  HEENT: Mucous membranes moist, no injury, oropharynx clear  Lungs:  Normal effort and normal respiratory rate.  Breath sounds clear  to auscultation.  No  respiratory distress.  No rales, decreased breath sounds or rhonchi.    Heart: Normal rate.  Regular rhythm.  S1, S2 normal.  No murmur.   Abdomen: Abdomen is soft.  Bowel sounds are normal, no abdominal tenderness.  There is no rebound or guarding  Extremities: Trace edema of bilateral lower extremities  Neurological: No focal motor or sensory deficits, pupils reactive  Skin:  Warm and dry.  No rash or cyanosis.       Results Review:    Intake/Output:     Intake/Output Summary (Last 24 hours) at 11/9/2023 1424  Last data filed at 11/9/2023 0519  Gross per 24 hour   Intake --   Output 400 ml   Net -400 ml         DATA:  Radiology and Labs:  The following labs independently reviewed by me. Additional labs ordered for tomorrow a.m.  Interval notes, chart personally reviewed by me.   Old records independently reviewed showing normal baseline creatinine, history of CHF  Discussed with patient's family at bedside    Risk/ complexity of medical care/ medical decision making moderate risk, KARLI, fluid and electrolyte management    Labs:   Recent Results (from the past 24 hour(s))   SYBIL Comprehensive Panel    Collection Time: 11/08/23  6:25 PM    Specimen: Blood   Result Value Ref Range    Anti-DNA (DS) Ab Qn <1 0 - 9 IU/mL    RNP Antibodies <0.2 0.0 - 0.9 AI    Kinney Antibodies <0.2 0.0 - 0.9 AI    Antiscleroderma-70 Antibodies <0.2 0.0 - 0.9 AI    Sjogren's Anti-SS-A <0.2 0.0 - 0.9 AI    Sjogren's Anti-SS-B <0.2 0.0 - 0.9 AI    Antichromatin Antibodies <0.2 0.0 - 0.9 AI    YURIDIA-1 IgG <0.2 0.0 - 0.9 AI    Anti-Centromere B Antibodies <0.2 0.0 - 0.9 AI    See below: Comment    Hemoglobin & Hematocrit, Blood    Collection Time: 11/08/23  6:25 PM    Specimen: Blood   Result Value Ref Range    Hemoglobin 8.7 (L) 12.0 - 15.9 g/dL    Hematocrit 26.1 (L) 34.0 - 46.6 %   ECG 12 Lead Chest Pain    Collection Time: 11/08/23 11:13 PM   Result Value Ref Range    QT Interval 367 ms    QTC Interval 508 ms   Hemoglobin  & Hematocrit, Blood    Collection Time: 11/09/23 12:08 AM    Specimen: Blood   Result Value Ref Range    Hemoglobin 7.8 (L) 12.0 - 15.9 g/dL    Hematocrit 25.1 (L) 34.0 - 46.6 %   Magnesium    Collection Time: 11/09/23 12:08 AM    Specimen: Blood   Result Value Ref Range    Magnesium 1.6 1.6 - 2.4 mg/dL   High Sensitivity Troponin T    Collection Time: 11/09/23 12:08 AM    Specimen: Blood   Result Value Ref Range    HS Troponin T 28 (H) <14 ng/L   Renal Function Panel    Collection Time: 11/09/23  2:14 AM    Specimen: Blood   Result Value Ref Range    Glucose 172 (H) 65 - 99 mg/dL    BUN 17 8 - 23 mg/dL    Creatinine 1.07 (H) 0.57 - 1.00 mg/dL    Sodium 138 136 - 145 mmol/L    Potassium 4.3 3.5 - 5.2 mmol/L    Chloride 107 98 - 107 mmol/L    CO2 22.0 22.0 - 29.0 mmol/L    Calcium 8.3 (L) 8.6 - 10.5 mg/dL    Albumin 3.4 (L) 3.5 - 5.2 g/dL    Phosphorus 4.0 2.5 - 4.5 mg/dL    Anion Gap 9.0 5.0 - 15.0 mmol/L    BUN/Creatinine Ratio 15.9 7.0 - 25.0    eGFR 51.0 (L) >60.0 mL/min/1.73   High Sensitivity Troponin T 2Hr    Collection Time: 11/09/23  2:14 AM    Specimen: Blood   Result Value Ref Range    HS Troponin T 32 (H) <14 ng/L    Troponin T Delta 4 (C) >=-4 - <+4 ng/L       Radiology:  Pertinent radiology studies were reviewed as described above      Medications have been reviewed separately in chart overview      ASSESSMENT:  Acute kidney injury, prerenal, rapidly improving and near baseline  Chronic diastolic CHF  Nausea vomiting and dehydration, better  Left lingular lung mass  Myeloproliferative disorder  Chronic leukocytosis  Atrial fibrillation  Diarrhea, slowly resolving  Acute on chronic anemia  Diabetes mellitus  UTI on ertapenem, ESBL E. coli  Lung mass, biopsy 11/8  Postop pneumothorax, had improved by the time chest tube was to be placed      DISCUSSION/PLAN:   Renal function slightly worse today which is not unexpected given her hypotension and hypoxia at the time of bronchoscopy with new  pneumothorax  Fortunately the pneumothorax was small and she did not require a chest tube  Volume status acceptable  Oxygen requirements near baseline  We will hold off on diuretics today  We will plan to restart low-dose oral diuretics in a.m.     Continue to monitor electrolytes and volume closely  Discussed with family at bedside      Darrell Hanley MD  [facility]   Office phone number: [phone]  Answering service phone number: [phone]    11/09/23  14:24 EST    Dictation performed using Dragon dictation software

## 2023-11-09 NOTE — PROGRESS NOTES
Dr. DELL Alford    [address]        Patient ID:  Name:  Catherine Boyer  MRN:  [id number]  1940  83 y.o.  female            CC/Reason for visit: pulm infilrates    Interval hx: Patient is now complaining of any chest pain.  Some dry cough.  Had some brown sputum earlier  Hard of hearing, little confused    ROS: No fever, No abdominal pain    Vitals:  Vitals:    11/09/23 0600 11/09/23 0812 11/09/23 1347 11/09/23 1558   BP:  118/74 110/70    BP Location:  Right arm Right arm    Patient Position:  Lying Lying    Pulse:  61 94 93   Resp:  18 18 20   Temp:  98.2 °F (36.8 °C) 99 °F (37.2 °C)    TempSrc:  Oral Oral    SpO2:  97% 93% 95%   Weight: 67.7 kg (149 lb 4 oz)              Body mass index is 25.8 kg/m².    Intake/Output Summary (Last 24 hours) at 11/9/2023 1603  Last data filed at 11/9/2023 0519  Gross per 24 hour   Intake --   Output 400 ml   Net -400 ml       Exam:  GEN:  No distress  Alert, oriented x 2, little confused  LUNGS: Bilateral wheezing and coarse breath sounds bilat, no use of accessory muscles  CV:  Normal S1S2, without murmur, no edema  ABD:  Non tender, no enlarged liver or masses      Scheduled meds:  famotidine, 20 mg, Oral, Daily  latanoprost, 1 drop, Both Eyes, Nightly  lidocaine, 20 mL, Infiltration, Once  metoprolol succinate XL, 25 mg, Oral, Daily  Momelotinib Dihydrochloride, 200 mg, Oral, Daily  saccharomyces boulardii, 250 mg, Oral, BID  senna-docusate sodium, 2 tablet, Oral, BID  sertraline, 100 mg, Oral, Daily      IV meds:                           Data Review:   I reviewed the patient's medications and new clinical results.            Results from last 7 days   Lab Units 11/09/23  0214 11/09/23  0008 11/08/23  1825 11/08/23  0559 11/07/23  0506 11/06/23  2100 11/06/23  0611 11/05/23  0124 11/04/23  0626   SODIUM mmol/L 138  --   --  138 138  --  138   < > 139   POTASSIUM mmol/L 4.3  --   --  3.4* 3.5  --  3.5   < > 3.7   CHLORIDE mmol/L 107  --   --  104 107   --  109*   < > 106   CO2 mmol/L 22.0  --   --  24.6 23.6  --  20.9*   < > 19.0*   BUN mg/dL 17  --   --  7* 11  --  16   < > 42*   CREATININE mg/dL 1.07*  --   --  0.72 0.88  --  0.94   < > 1.79*   CALCIUM mg/dL 8.3*  --   --  8.3* 8.3*  --  8.1*   < > 8.2*   GLUCOSE mg/dL 172*  --   --  96 97  --  95   < > 112*   WBC 10*3/mm3  --   --   --  46.36* 35.70*  --  41.43*  --   --    HEMOGLOBIN g/dL  --  7.8* 8.7* 9.6* 7.7*   < > 6.8*  --   --    PLATELETS 10*3/mm3  --   --   --  240 278  --  271  --   --    PROCALCITONIN ng/mL  --   --   --   --   --   --   --   --  0.09    < > = values in this interval not displayed.     Results from last 7 days   Lab Units 11/04/23  1603 11/03/23  1603   BLOODCX  No growth at 5 days  No growth at 5 days  --    URINECX   --  >100,000 CFU/mL Escherichia coli ESBL*           ASSESSMENT:   Right pneumothorax, iatrogenic after bronchoscopic biopsy  Bilateral pulmonary infiltrates  Hypotension  [place]ula pleural-based opacity  Acute kidney injury  CAD (coronary artery disease)  Paroxysmal atrial fibrillation  Myeloproliferative disorder  Type 2 diabetes mellitus with circulatory disorder, without long-term current use of insulin  Hypertension   Personal history of transient ischemic attack (TIA), 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 deficits  Hypoxemia      PLAN:  I discussed the case with interventional radiologist yesterday.  Did not need CT-guided chest tube after all because the pneumothorax was quite small.  We will add some nebulized bronchodilators.  She is wheezing.  Wait for pathology reports.  Unfortunately I could only take 3 biopsies.  Usually we get anywhere from 6-10 bites of lung tissue but after her third biopsy she started bleeding quite significantly.  For this reason we had to abort the procedure.  She also developed small pneumothorax but did not need chest tube.  Due to her advanced age and multiple comorbidities, if this bronchoscopy does not yield the results, I do  not recommend any additional invasive work-up.  May resume Eliquis tomorrow.  I would not put her on both antiplatelets as well as DOAC's.  We need to start considering palliative care.  Family says she was already not eating any food prior to coming to the hospital.  She had given up.        Raoul Alford MD  11/9/2023

## 2023-11-09 NOTE — SIGNIFICANT NOTE
11/09/23 1201   OTHER   Discipline physical therapist   Rehab Time/Intention   Session Not Performed patient/family declined, not feeling well  (pt continues to decline therapy and wants to rest; discussed POC with pt & family - will attempt to f/u tomorrow to see if pt will participate; palliative is following as pt will need 24/7 assistance at D/C)   Therapy Assessment/Plan (PT)   Criteria for Skilled Interventions Met (PT) yes   Recommendation   PT - Next Appointment 11/10/23

## 2023-11-09 NOTE — PROGRESS NOTES
HOSPITAL FOLLOW UP NOTE    Patient Name: Catherine Boyer  Patient : 1940        Date of Service:23  Provider of Service: ABEL Dolan  Place of Service: [facility]  Referral Provider: Jaimee Macias, *          Follow Up:  chest pain    Interval Hx: VSS,  status post bronch , small PTX post bronch, feels like she is breathing better, afib 90's-110's on tele,  Hb 7.8      OBJECTIVE  Temp:  [97.3 °F (36.3 °C)-98.8 °F (37.1 °C)] 98.8 °F (37.1 °C)  Heart Rate:  [] 103  Resp:  [16-24] 18  BP: ()/(36-61) 111/57     Intake/Output Summary (Last 24 hours) at 2023 0801  Last data filed at 2023 0519  Gross per 24 hour   Intake 950 ml   Output 400 ml   Net 550 ml     Body mass index is 25.8 kg/m².      23  0600 23  0428 23  0600   Weight: 67.6 kg (149 lb 0.5 oz) 67.7 kg (149 lb 4 oz) 67.7 kg (149 lb 4 oz)         Physical Exam:     General Appearance:    Alert, cooperative, in no acute distress, resting   Head:    Normocephalic, without obvious abnormality, atraumatic   Eyes:            Conjunctivae and sclerae normal, no   icterus, no pallor, corneas clear, PERRLA   Neck:   No adenopathy, supple, trachea midline, no thyromegaly, no   carotid bruit, no JVD   Lungs:    Diminished t/o with scattered wheezes, respirations regular, shallow, on 3.5L NaO2    Heart:    Irregular rhythm/ rate, normal S1 and S2, no murmur, no gallop, no rub, no click   Chest Wall:    No abnormalities observed   Abdomen:     Normal bowel sounds, no masses, no organomegaly, soft, nontender, nondistended, no guarding, no rebound  tenderness   Extremities:   Moves all extremities well, no edema, no cyanosis, no redness   Pulses:   Pulses palpable and equal bilaterally.          CURRENT MEDS    Scheduled Meds:ertapenem, 1,000 mg, Intravenous, Q24H  famotidine, 20 mg, Oral, Daily  lactated ringers, 500 mL, Intravenous, Once  latanoprost, 1 drop, Both Eyes,  Nightly  lidocaine, 20 mL, Infiltration, Once  metoprolol succinate XL, 25 mg, Oral, Daily  Momelotinib Dihydrochloride, 200 mg, Oral, Daily  saccharomyces boulardii, 250 mg, Oral, BID  senna-docusate sodium, 2 tablet, Oral, BID  sepsis fluid NS, 1,000 mL, Intravenous, Once  sertraline, 100 mg, Oral, Daily      Continuous Infusions:lactated ringers, 30 mL/hr, Last Rate: 30 mL/hr (11/08/23 1022)          Lab Review:   Results from last 7 days   Lab Units 11/09/23  0214 11/08/23  0559 11/03/23  0540 11/02/23  1631   SODIUM mmol/L 138 138   < > 132*   POTASSIUM mmol/L 4.3 3.4*   < > 4.2   CHLORIDE mmol/L 107 104   < > 98   CO2 mmol/L 22.0 24.6   < > 20.0*   BUN mg/dL 17 7*   < > 55*   CREATININE mg/dL 1.07* 0.72   < > 2.96*   GLUCOSE mg/dL 172* 96   < > 148*   CALCIUM mg/dL 8.3* 8.3*   < > 8.5*   AST (SGOT) U/L  --   --   --  22   ALT (SGPT) U/L  --   --   --  17    < > = values in this interval not displayed.         Results from last 7 days   Lab Units 11/09/23  0008 11/08/23  1825 11/08/23  0559 11/07/23  0506   WBC 10*3/mm3  --   --  46.36* 35.70*   HEMOGLOBIN g/dL 7.8* 8.7* 9.6* 7.7*   HEMATOCRIT % 25.1* 26.1* 28.8* 24.2*   PLATELETS 10*3/mm3  --   --  240 278         Results from last 7 days   Lab Units 11/09/23  0008 11/05/23  0124   MAGNESIUM mg/dL 1.6 1.9     2D Echocardiogram 05/06/2023:    Left ventricular systolic function is low normal. Left ventricular ejection fraction appears to be 51 - 55%.    Left ventricular wall thickness is consistent with mild septal asymmetric hypertrophy.    The left atrial cavity is moderately dilated.    Mild aortic valve stenosis is present.    Peak velocity of the flow distal to the aortic valve is 202 cm/s. Aortic valve maximum pressure gradient is 16 mmHg. Aortic valve mean pressure gradient is 9 mmHg. Aortic valve dimensionless index is 0.8 .    Estimated right ventricular systolic pressure from tricuspid regurgitation is markedly elevated (>55 mmHg). Calculated right  ventricular systolic pressure from tricuspid regurgitation is 62 mmHg.    Severe pulmonary hypertension is present.    Mild dilation of the ascending aorta is present. 3.7 cm.    Mild to moderate mitral regurgitation is noted.    ASSESSMENT & PLAN    Acute kidney injury    CAD (coronary artery disease)    Paroxysmal atrial fibrillation    Myeloproliferative disorder    Type 2 diabetes mellitus with circulatory disorder, without long-term current use of insulin    Hypertension    Personal history of transient ischemic attack (TIA), 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 deficits    Myelofibrosis    Pulmonary infiltrate    1.  Chest pain: In setting of severe anemia.  No further chest pain/ discomfort  2.  Myeloproliferative disorder  3.  Paroxysmal atrial fibrillation: Afib controlled rate.  On beta blocker.  Eliquis on hold at this time  4.  Severe anemia: Hb down to 7.8 this am.  Received PRBC transfusions.  Increased bleeding during bronch. Eliquis and Plavix being held  5.  Coronary artery disease: Status post PCI to LAD and diagonal in February 2022, status post repeat stenting to diagonal secondary to in-stent restenosis September 2022, known  of RCA.    6.  Severe pulmonary hypertension : RVSP 62mm Hg, mild TR  7.  NSTEMI: Type II secondary to supply demand mismatch as evidenced by severe anemia  8.  Small right PTX: post bronchoscopy 11/08.    9.  Valvular disease: mild aortic stenosis, mild to moderate mitral regurgitation, mild tricuspid regurgitation      Sherry Olmedo, APRN  11/09/23

## 2023-11-09 NOTE — CASE MANAGEMENT/SOCIAL WORK
Continued Stay Note  [facility]     Patient Name: Catherine Boyer  MRN: [id number]  Today's Date: 11/9/2023    Admit Date: 11/2/2023    Plan: TBD   Discharge Plan       Row Name 11/09/23 1242       Plan    Plan TBD    Plan Comments Pt lives at assisted living with [facility] .  Pt has declined to work w/ PT for the past few days.  Palliative care is following and plans to meet with pt and family tomorrow.  CCP will followup after that to assist as needed. .........Cherry CONTRERAS/ CCP                   Discharge Codes    No documentation.                 Expected Discharge Date and Time       Expected Discharge Date Expected Discharge Time    Nov 10, 2023               Cherry Bowen RN

## 2023-11-09 NOTE — PROGRESS NOTES
".            [facility]    Palliative Care Daily Progress Note   Chief complaint-follow up goals of care/advanced care planning and support for patient/family    Code Status:   Code Status and Medical Interventions:   Ordered at: 11/02/23 1949     Code Status (Patient has no pulse and is not breathing):    CPR (Attempt to Resuscitate)     Medical Interventions (Patient has pulse or is breathing):    Full      Advanced Directives: Advance Directive Status: Patient has advance directive, copy in chart   Goals of Care: Ongoing.     S: Medical record reviewed. Events noted.  Patient sleeping in bed, not wanting to engage in conversation. She does report being tired and feeling short of breath but \"ok\". She has eaten yet this morning. She also declined PT this morning. I reviewed labs (path pending from bronchoscopy), medical notes, therapy notes, imaging and MAR. I spoke with KARRI Stone and KARRI Soto.              Review of Systems   Constitutional: Positive for decreased appetite and malaise/fatigue.   Cardiovascular:  Negative for chest pain.   Respiratory:  Positive for shortness of breath.    Gastrointestinal:  Negative for nausea.   Neurological:  Positive for weakness.   Psychiatric/Behavioral:  The patient is nervous/anxious.      Pain Assessment  Preferred Pain Scale: number (Numeric Rating Pain Scale)  Pain Location: chest  Pain Description: woke from sleep, crushing    O:     Intake/Output Summary (Last 24 hours) at 11/9/2023 1022  Last data filed at 11/9/2023 0519  Gross per 24 hour   Intake 950 ml   Output 400 ml   Net 550 ml       Diagnostics and current medications: Reviewed.    Current Facility-Administered Medications   Medication Dose Route Frequency Provider Last Rate Last Admin    acetaminophen (TYLENOL) tablet 650 mg  650 mg Oral Q4H PRN Raoul Alford MD   650 mg at 11/08/23 2151    sennosides-docusate (PERICOLACE) 8.6-50 MG per tablet 2 tablet  2 tablet Oral " BID Raoul Alford MD   2 tablet at 11/09/23 0920    And    polyethylene glycol (MIRALAX) packet 17 g  17 g Oral Daily PRN Raoul Alford MD        And    bisacodyl (DULCOLAX) EC tablet 5 mg  5 mg Oral Daily PRN Raoul Alford MD        And    bisacodyl (DULCOLAX) suppository 10 mg  10 mg Rectal Daily PRN Raoul Alford MD        ertapenem (INVanz) 1,000 mg in sodium chloride 0.9 % 100 mL IVPB-VTB  1,000 mg Intravenous Q24H Raoul Alford  mL/hr at 11/08/23 1641 1,000 mg at 11/08/23 1641    famotidine (PEPCID) tablet 20 mg  20 mg Oral Daily Raoul Alford MD   20 mg at 11/09/23 0920    HYDROcodone-acetaminophen (NORCO) 5-325 MG per tablet 1 tablet  1 tablet Oral Q6H PRN Raoul Alford MD   1 tablet at 11/04/23 1932    lactated ringers bolus 500 mL  500 mL Intravenous Once Raoul Aflord MD        lactated ringers infusion  30 mL/hr Intravenous Continuous Raoul Alford MD 30 mL/hr at 11/08/23 1022 Restarted at 11/08/23 1042    latanoprost (XALATAN) 0.005 % ophthalmic solution 1 drop  1 drop Both Eyes Nightly Raoul Alford MD   1 drop at 11/08/23 2152    lidocaine (XYLOCAINE) 1 % injection 20 mL  20 mL Infiltration Once Kenton Meeks MD        melatonin tablet 3 mg  3 mg Oral Nightly PRN Raoul Alford MD   3 mg at 11/08/23 2151    metoprolol succinate XL (TOPROL-XL) 24 hr tablet 25 mg  25 mg Oral Daily Raoul Alford MD   25 mg at 11/09/23 0921    Momelotinib Dihydrochloride (OJJAARA) 200 mg  200 mg Oral Daily Raoul Alford MD   200 mg at 11/09/23 1001    morphine injection 2 mg  2 mg Intravenous Q3H PRN Raoul Alford MD        nitroglycerin (NITROSTAT) SL tablet 0.4 mg  0.4 mg Sublingual Q5 Min PRN Raoul Alford MD   0.4 mg at 11/08/23 2617    ondansetron (ZOFRAN) tablet 4 mg  4 mg Oral Q6H PRN Raoul Alford MD        Or    ondansetron (ZOFRAN) injection 4 mg  4 mg Intravenous Q6H PRN Raoul Alford MD   4 mg at 11/06/23 1156    Phosphorus Replacement - Follow Nurse  / BPA Driven Protocol   Does not apply PRN Raoul Alford MD        Potassium Replacement - Follow Nurse / BPA Driven Protocol   Does not apply PRN Raoul Alford MD        saccharomyces boulardii (FLORASTOR) capsule 250 mg  250 mg Oral BID Raoul Alford MD   250 mg at 11/09/23 0920    sepsis fluid NS 0.9 % bolus 1,000 mL  1,000 mL Intravenous Once Raoul Alford MD        sertraline (ZOLOFT) tablet 100 mg  100 mg Oral Daily Raoul Alford MD   100 mg at 11/09/23 0928    simethicone (MYLICON) chewable tablet 80 mg  80 mg Oral 4x Daily PRN Raoul Alford MD   80 mg at 11/08/23 2152    sodium chloride 0.9 % flush 10 mL  10 mL Intravenous PRN Raolu Alford MD         lactated ringers, 30 mL/hr, Last Rate: 30 mL/hr (11/08/23 1022)        acetaminophen    senna-docusate sodium **AND** polyethylene glycol **AND** bisacodyl **AND** bisacodyl    HYDROcodone-acetaminophen    melatonin    Morphine    nitroglycerin    ondansetron **OR** ondansetron    Phosphorus Replacement - Follow Nurse / BPA Driven Protocol    Potassium Replacement - Follow Nurse / BPA Driven Protocol    simethicone    Insert Peripheral IV **AND** sodium chloride  Attest that current medications reviewed including but not limited to prescriptions, over-the counter, herbals and vitamin/mineral/dietary (nutritional) supplements for name, route of administration, type, dose and frequency and are current using all immediate resources available at time of dictation.    A:    /74 (BP Location: Right arm, Patient Position: Lying)   Pulse 61   Temp 98.2 °F (36.8 °C) (Oral)   Resp 18   Wt 67.7 kg (149 lb 4 oz)   SpO2 97%   BMI 25.80 kg/m²     Constitutional:       Appearance: Not in distress. Chronically ill-appearing.      Interventions: Nasal cannula in place.      Comments: NC 4L   Elderly    Pulmonary:      Effort: Pulmonary effort is normal.      Breath sounds: Normal breath sounds.   Cardiovascular:      PMI at left midclavicular  line. Normal rate. Regular rhythm.   Edema:     Peripheral edema absent.   Abdominal:      General: Bowel sounds are normal.      Palpations: Abdomen is soft.   Neurological:      Mental Status: Alert and oriented to person, place, and time.   Psychiatric:         Mood and Affect: Affect is not flat.         Speech: Speech normal.         Behavior: Behavior is cooperative.         Thought Content: Thought content normal.         Cognition and Memory: Cognition normal.         Patient status: Disease state: Controlled with current treatments.  Functional status: Palliative Performance Scale Score: Performance 50% based on the following measures: Ambulation: Mainly sit or lie down, Activity and Evidence of Disease: Unable to do any work, extensive evidence of disease, Self-Care: Considerable assistance required,  Intake: Normal or reduced, LOC: Full or confusion   Nutritional status: Albumin 3.5 on 11/ 8/2023 Body mass index is 25.8 kg/m².    Family support: The patient receives support from her children and extended family..  Advance Directives: Advance Directive Status: Patient has advance directive, copy in chart   POA/Healthcare surrogate-Karen and Karan .     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 Provide support with goals of care           2.  Palliative care encounter  11/8/2023- The patient was sleeping upon my arrival to the room.  She was due to go  down for bronchoscopy and was tired from not much rest during the night.  There were two daughters (Karen and Ila) at bedside and requested to speak with me privately and patient agreeable.  The patient  does have 5 children.  She does have durable power  paperwork on file that designates her daughter, Karen and then Karan.  The patient has resided at [facility] for about 1-1/2 years, which was after the death of her .  Ila is the main caregiver ,as her other siblings live out of state.  Both children have a very good understanding of patient's condition, both acute and chronic, which we reviewed.  They report over the last 3 months the patient has been progressively declining with inability to care for herself.  All she wants to do is sleep she is extremely tired and also reduced appetite.  At this time they are uncertain what direction to take.  But they do want her to enjoy what time she has left them to be okay with that. They are willing to support her with any decision she makes.  I provided  both verbal and written information regarding palliative and hospice care.  Ila does acknowledge some caregiver fatigue. We will plan to meet tomorrow and speak with the patient to include her with decision making. I will plan to address CODE status/medical interventions. Of note, there was an attempt to have advance directive completed while inpatient and they patient got upset regarding the conversation per her daughters. I spoke with KARRI Guevara.     11/9/2023- I attempted to see patient twice this morning. The first time she was resting and did engage in conversation. The second time she requested that we have a discussion tomorrow because she is tired and not ready to talk about things today. She is aware of my role and wanting to have discussion regarding goals of care. I did meet privately with three of her children (Karen, Ila, and Greg). I provided information regarding palliative care and [facility] to Greg. He has good understanding of his mothers condition and very supportive her. We all discussed grief and the importance of patience right now  with their mother. They find that she is very guarded her conversations and tells them she wants to live but her behavior says otherwise. They have some anxiety as they await pathology findings as well.  I will plan to follow up at 10 am tomorrow at patient request. I spoke with KARRI Stone.       Thank you for this consult and allowing us to participate in patient's plan of care. Palliative Care Team will continue to follow patient.       ABEL Hart  11/9/2023  10:22 EST

## 2023-11-09 NOTE — SIGNIFICANT NOTE
11/09/23 1306   OTHER   Discipline occupational therapist   Rehab Time/Intention   Session Not Performed patient/family declined, not feeling well  (Patient declined therapy services today, does not feel well requests for OT to follow up tomorrow)   Therapy Assessment/Plan (PT)   Criteria for Skilled Interventions Met (PT) yes   Recommendation   OT - Next Appointment 11/10/23

## 2023-11-09 NOTE — PROGRESS NOTES
Name: Catherine Boyer ADMIT: 2023   : 1940  PCP: Kristi Moreau APRN    MRN: [id number] LOS: 7 days   AGE/SEX: 83 y.o. female  ROOM: [facility]     Subjective     Patient had bronchoscopy and biopsy yesterday and developed a small right pneumothorax. She feels about the same today.  No family at bedside.    Objective   Objective   Vital Signs  Temp:  [97.3 °F (36.3 °C)-99 °F (37.2 °C)] 99 °F (37.2 °C)  Heart Rate:  [] 93  Resp:  [18-20] 20  BP: (100-136)/(51-74) 110/70  SpO2:  [91 %-97 %] 95 %  on  Flow (L/min):  [2-3] 2;   Device (Oxygen Therapy): nasal cannula  Body mass index is 25.8 kg/m².  Physical Exam  Vitals and nursing note reviewed.   Constitutional:       General: She is not in acute distress.     Appearance: She is not toxic-appearing.      Comments: Elderly, frail   HENT:      Head: Normocephalic and atraumatic.      Nose: Nose normal.      Mouth/Throat:      Mouth: Mucous membranes are moist.      Pharynx: Oropharynx is clear.   Eyes:      Conjunctiva/sclera: Conjunctivae normal.      Pupils: Pupils are equal, round, and reactive to light.   Cardiovascular:      Rate and Rhythm: Normal rate and regular rhythm.   Pulmonary:      Effort: Pulmonary effort is normal.      Breath sounds: Examination of the right-lower field reveals decreased breath sounds. Examination of the left-lower field reveals decreased breath sounds. Decreased breath sounds present.   Abdominal:      General: Bowel sounds are normal. There is no distension.      Palpations: Abdomen is soft.      Tenderness: There is no abdominal tenderness.   Musculoskeletal:         General: No swelling or tenderness.      Cervical back: Neck supple.   Skin:     General: Skin is warm and dry.      Capillary Refill: Capillary refill takes less than 2 seconds.   Neurological:      General: No focal deficit present.      Mental Status: She is alert.      Comments: Hard of hearing       Results Review     I reviewed the patient's new  "clinical results.  Results from last 7 days   Lab Units 11/09/23  0008 11/08/23  1825 11/08/23  0559 11/07/23  0506 11/06/23  2100 11/06/23  0611 11/03/23  0540   WBC 10*3/mm3  --   --  46.36* 35.70*  --  41.43* 39.23*   HEMOGLOBIN g/dL 7.8* 8.7* 9.6* 7.7*   < > 6.8* 7.6*   PLATELETS 10*3/mm3  --   --  240 278  --  271 413    < > = values in this interval not displayed.     Results from last 7 days   Lab Units 11/09/23 0214 11/08/23 0559 11/07/23  0506 11/06/23  0611   SODIUM mmol/L 138 138 138 138   POTASSIUM mmol/L 4.3 3.4* 3.5 3.5   CHLORIDE mmol/L 107 104 107 109*   CO2 mmol/L 22.0 24.6 23.6 20.9*   BUN mg/dL 17 7* 11 16   CREATININE mg/dL 1.07* 0.72 0.88 0.94   GLUCOSE mg/dL 172* 96 97 95   Estimated Creatinine Clearance: 36.2 mL/min (A) (by C-G formula based on SCr of 1.07 mg/dL (H)).  Results from last 7 days   Lab Units 11/09/23 0214 11/08/23 0559 11/07/23  0506 11/06/23  0611   ALBUMIN g/dL 3.4* 3.5 3.5 3.2*     Results from last 7 days   Lab Units 11/09/23 0214 11/09/23  0008 11/08/23  0559 11/07/23  1616 11/07/23  0506 11/06/23  0611 11/05/23  0905 11/05/23  0124   CALCIUM mg/dL 8.3*  --  8.3*  --  8.3* 8.1*   < > 7.9*   ALBUMIN g/dL 3.4*  --  3.5  --  3.5 3.2*   < >  --    MAGNESIUM mg/dL  --  1.6  --   --   --   --   --  1.9   PHOSPHORUS mg/dL 4.0  --  2.5 2.2* 1.8* 1.7*   < >  --     < > = values in this interval not displayed.     Results from last 7 days   Lab Units 11/05/23  0905 11/05/23  0124 11/04/23  2247 11/04/23  1910 11/04/23  1603 11/04/23  0626   PROCALCITONIN ng/mL  --   --   --   --   --  0.09   LACTATE mmol/L 1.8 3.2* 3.1* 3.2*   < >  --     < > = values in this interval not displayed.     COVID19   Date Value Ref Range Status   11/08/2023 Not Detected Not Detected - Ref. Range Final   08/13/2023 Not Detected Not Detected - Ref. Range Final     No results found for: \"HGBA1C\", \"POCGLU\"  Results for orders placed or performed during the hospital encounter of 11/02/23   Blood Culture - " Blood, Arm, Right    Specimen: Arm, Right; Blood   Result Value Ref Range    Blood Culture No growth at 5 days          XR Chest 1 View  Narrative: Portable chest radiograph     HISTORY: Pneumothorax     TECHNIQUE: Single AP portable radiograph of the chest     COMPARISON: Chest radiograph 11/8/2023     Impression: FINDINGS AND IMPRESSION:  Previously seen right pneumothorax has decreased in size resulting in  approximately 1.6 cm in pleural-parenchymal separation (previously 2.8  cm). There is moderate pulmonary opacification throughout bilateral  lungs, right greater than left, suggestive of multifocal pneumonia  and/or pulmonary edema in the appropriate clinical context and  correlation with patient history is recommended with follow-up chest CT  if clinically indicated. Given the somewhat masslike opacification  overlying the right lung, at least continued attention on follow-up  chest radiograph in 4 to 6 weeks is recommended to ensure appropriate  evolution/resolution and exclude any possibility of neoplasm.     Cardiac silhouette is within normal its for size.     This report was finalized on 11/9/2023 1:13 AM by Dr. Cuba Lieberman M.D  on Workstation: [id number]       Scheduled Medications  famotidine, 20 mg, Oral, Daily  ipratropium-albuterol, 3 mL, Nebulization, 4x Daily - RT  latanoprost, 1 drop, Both Eyes, Nightly  lidocaine, 20 mL, Infiltration, Once  metoprolol succinate XL, 25 mg, Oral, Daily  Momelotinib Dihydrochloride, 200 mg, Oral, Daily  saccharomyces boulardii, 250 mg, Oral, BID  senna-docusate sodium, 2 tablet, Oral, BID  sertraline, 100 mg, Oral, Daily    Infusions       Diet  Diet: Cardiac Diets, Diabetic Diets; Healthy Heart (2-3 Na+); Consistent Carbohydrate; Texture: Regular Texture (IDDSI 7); Fluid Consistency: Thin (IDDSI 0)    I reviewed Imaging, labs, microbiology, ECG/telemetry.       Assessment/Plan     Active Hospital Problems    Diagnosis  POA    **Acute kidney injury [N17.9]  Yes     Pulmonary infiltrate [R91.8]  Unknown    Myelofibrosis [D75.81]  Yes    Personal history of transient ischemic attack (TIA), 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   Acute Cystitis without Hematuria  - urine culture grew ESBL  - completed ertapenem    KARLI  - likely from volume depletion from reduced oral intake and diarrhea in the setting of diuretic treatment  - resolved with IVF, these are now stopped  - continue holding diuretics  - appreciate nephrology recs    Lung mass  - indeterminate, lingular   - s/p bronchoscopy 11/8/23 with biopsies, pathology pending  - has small iatrogenic pneumothorax, improved today on cxr  - appreciate pulmonology recs    Aortic Stenosis/Chronic Diastolic CHF  - appears euvolemic  - continue to monitor volume status off of diuretics    Coronary artery disease  Paroxysmal atrial fibrillation  Hypertension  - Eliquis and Plavix held for bronchoscopy  - HR and BP controlled, continue metoprolol    Myeloproliferative disorder  - has chronic leukocytosis  - s/p 1 unit of PRBCs 11/6/23 and 11/7/23 with good response  - monitor and transfuse as needed      SCDs for dvt ppx   Full code.  Discussed with patient and nursing staff.  Anticipate discharge  TBD  timing yet to be determined.    Guarded prognosis. Palliative care following.    Kenton Meeks MD  [facility]  11/09/23  16:03 EST    Portions of this note have been copied and I have reviewed these. They are accurate as of 11/9/2023

## 2023-11-09 NOTE — PLAN OF CARE
Goal Outcome Evaluation:  Plan of Care Reviewed With: patient met lying in bed. She complained of chestpain and was medicated with good effect. Stat EKG revealed elevated QTC of 508 which prompted a check of her magnesium level which is normal. Troponin is elevated (see chart), chest xray also done (see results). She has since slept for the rest of the shift.         Progress: no change

## 2023-11-09 NOTE — PLAN OF CARE
Problem: Adult Inpatient Plan of Care  Goal: Plan of Care Review  Outcome: Ongoing, Progressing     Problem: Adult Inpatient Plan of Care  Goal: Plan of Care Review  Outcome: Ongoing, Progressing     Problem: Glycemic Control Impaired (Sepsis/Septic Shock)  Goal: Blood Glucose Level Within Desired Range  Outcome: Ongoing, Progressing  Intervention: Optimize Glycemic Control  Flowsheets (Taken 11/9/2023 6266)  Glycemic Management:   blood glucose monitored   insulin dose matched to carbohydrate intake   oral hydration promoted   Goal Outcome Evaluation:

## 2023-11-10 LAB
ALBUMIN SERPL-MCNC: 3.4 G/DL (ref 3.5–5.2)
ANION GAP SERPL CALCULATED.3IONS-SCNC: 9.7 MMOL/L (ref 5–15)
BACTERIA SPEC AEROBE CULT: NORMAL
BUN SERPL-MCNC: 17 MG/DL (ref 8–23)
BUN/CREAT SERPL: 20.7 (ref 7–25)
CALCIUM SPEC-SCNC: 8.9 MG/DL (ref 8.6–10.5)
CHLORIDE SERPL-SCNC: 108 MMOL/L (ref 98–107)
CO2 SERPL-SCNC: 24.3 MMOL/L (ref 22–29)
CREAT SERPL-MCNC: 0.82 MG/DL (ref 0.57–1)
DEPRECATED RDW RBC AUTO: 54.4 FL (ref 37–54)
EGFRCR SERPLBLD CKD-EPI 2021: 70.2 ML/MIN/1.73
ERYTHROCYTE [DISTWIDTH] IN BLOOD BY AUTOMATED COUNT: 16.1 % (ref 12.3–15.4)
FERRITIN SERPL-MCNC: 187 NG/ML (ref 13–150)
GLUCOSE SERPL-MCNC: 116 MG/DL (ref 65–99)
GRAM STN SPEC: NORMAL
HCT VFR BLD AUTO: 24.6 % (ref 34–46.6)
HGB BLD-MCNC: 7.9 G/DL (ref 12–15.9)
IRON 24H UR-MRATE: 18 MCG/DL (ref 37–145)
IRON SATN MFR SERPL: 5 % (ref 20–50)
LAB AP CASE REPORT: NORMAL
LAB AP DIAGNOSIS COMMENT: NORMAL
LYMPHOCYTES # BLD MANUAL: 1.75 10*3/MM3 (ref 0.7–3.1)
LYMPHOCYTES NFR BLD MANUAL: 4 % (ref 5–12)
MCH RBC QN AUTO: 29.8 PG (ref 26.6–33)
MCHC RBC AUTO-ENTMCNC: 32.1 G/DL (ref 31.5–35.7)
MCV RBC AUTO: 92.8 FL (ref 79–97)
METAMYELOCYTES NFR BLD MANUAL: 2 % (ref 0–0)
MONOCYTES # BLD: 2.33 10*3/MM3 (ref 0.1–0.9)
MYELOCYTES NFR BLD MANUAL: 1 % (ref 0–0)
NEUTROPHILS # BLD AUTO: 52.37 10*3/MM3 (ref 1.7–7)
NEUTROPHILS NFR BLD MANUAL: 90 % (ref 42.7–76)
PATH REPORT.FINAL DX SPEC: NORMAL
PATH REPORT.GROSS SPEC: NORMAL
PHOSPHATE SERPL-MCNC: 3.4 MG/DL (ref 2.5–4.5)
PLAT MORPH BLD: NORMAL
PLATELET # BLD AUTO: 188 10*3/MM3 (ref 140–450)
PMV BLD AUTO: 11 FL (ref 6–12)
POTASSIUM SERPL-SCNC: 3.9 MMOL/L (ref 3.5–5.2)
QT INTERVAL: 367 MS
QTC INTERVAL: 508 MS
RBC # BLD AUTO: 2.65 10*6/MM3 (ref 3.77–5.28)
RBC MORPH BLD: NORMAL
SODIUM SERPL-SCNC: 142 MMOL/L (ref 136–145)
TIBC SERPL-MCNC: 393 MCG/DL (ref 298–536)
TRANSFERRIN SERPL-MCNC: 264 MG/DL (ref 200–360)
VARIANT LYMPHS NFR BLD MANUAL: 3 % (ref 19.6–45.3)
WBC MORPH BLD: NORMAL
WBC NRBC COR # BLD: 58.19 10*3/MM3 (ref 3.4–10.8)

## 2023-11-10 PROCEDURE — 94799 UNLISTED PULMONARY SVC/PX: CPT

## 2023-11-10 PROCEDURE — 83540 ASSAY OF IRON: CPT | Performed by: INTERNAL MEDICINE

## 2023-11-10 PROCEDURE — 94664 DEMO&/EVAL PT USE INHALER: CPT

## 2023-11-10 PROCEDURE — 94760 N-INVAS EAR/PLS OXIMETRY 1: CPT

## 2023-11-10 PROCEDURE — 97116 GAIT TRAINING THERAPY: CPT

## 2023-11-10 PROCEDURE — 80069 RENAL FUNCTION PANEL: CPT | Performed by: INTERNAL MEDICINE

## 2023-11-10 PROCEDURE — 82728 ASSAY OF FERRITIN: CPT | Performed by: INTERNAL MEDICINE

## 2023-11-10 PROCEDURE — 99497 ADVNCD CARE PLAN 30 MIN: CPT | Performed by: NURSE PRACTITIONER

## 2023-11-10 PROCEDURE — 99233 SBSQ HOSP IP/OBS HIGH 50: CPT | Performed by: NURSE PRACTITIONER

## 2023-11-10 PROCEDURE — 97535 SELF CARE MNGMENT TRAINING: CPT

## 2023-11-10 PROCEDURE — 99232 SBSQ HOSP IP/OBS MODERATE 35: CPT | Performed by: NURSE PRACTITIONER

## 2023-11-10 PROCEDURE — 84466 ASSAY OF TRANSFERRIN: CPT | Performed by: INTERNAL MEDICINE

## 2023-11-10 PROCEDURE — 25010000002 METHYLPREDNISOLONE PER 40 MG: Performed by: INTERNAL MEDICINE

## 2023-11-10 PROCEDURE — 85025 COMPLETE CBC W/AUTO DIFF WBC: CPT | Performed by: INTERNAL MEDICINE

## 2023-11-10 PROCEDURE — 94761 N-INVAS EAR/PLS OXIMETRY MLT: CPT

## 2023-11-10 PROCEDURE — 97110 THERAPEUTIC EXERCISES: CPT

## 2023-11-10 PROCEDURE — 85007 BL SMEAR W/DIFF WBC COUNT: CPT | Performed by: INTERNAL MEDICINE

## 2023-11-10 RX ADMIN — METHYLPREDNISOLONE SODIUM SUCCINATE 40 MG: 40 INJECTION, POWDER, LYOPHILIZED, FOR SOLUTION INTRAMUSCULAR; INTRAVENOUS at 05:47

## 2023-11-10 RX ADMIN — IPRATROPIUM BROMIDE AND ALBUTEROL SULFATE 3 ML: 2.5; .5 SOLUTION RESPIRATORY (INHALATION) at 16:20

## 2023-11-10 RX ADMIN — SERTRALINE 100 MG: 100 TABLET, FILM COATED ORAL at 08:47

## 2023-11-10 RX ADMIN — LATANOPROST 1 DROP: 50 SOLUTION/ DROPS OPHTHALMIC at 22:08

## 2023-11-10 RX ADMIN — METOPROLOL SUCCINATE 25 MG: 25 TABLET, EXTENDED RELEASE ORAL at 08:47

## 2023-11-10 RX ADMIN — METHYLPREDNISOLONE SODIUM SUCCINATE 40 MG: 40 INJECTION, POWDER, LYOPHILIZED, FOR SOLUTION INTRAMUSCULAR; INTRAVENOUS at 16:40

## 2023-11-10 RX ADMIN — FAMOTIDINE 20 MG: 20 TABLET ORAL at 08:47

## 2023-11-10 RX ADMIN — IPRATROPIUM BROMIDE AND ALBUTEROL SULFATE 3 ML: 2.5; .5 SOLUTION RESPIRATORY (INHALATION) at 23:13

## 2023-11-10 RX ADMIN — IPRATROPIUM BROMIDE AND ALBUTEROL SULFATE 3 ML: 2.5; .5 SOLUTION RESPIRATORY (INHALATION) at 06:45

## 2023-11-10 RX ADMIN — Medication 250 MG: at 22:08

## 2023-11-10 RX ADMIN — SIMETHICONE 80 MG: 80 TABLET, CHEWABLE ORAL at 19:06

## 2023-11-10 RX ADMIN — Medication 250 MG: at 08:47

## 2023-11-10 RX ADMIN — Medication 3 MG: at 22:08

## 2023-11-10 NOTE — PROGRESS NOTES
"Dr. DELL Alford    [address]        Patient ID:  Name:  Catherine Boyer  MRN:  [id number]  1940  83 y.o.  female            CC/Reason for visit: Pulmonary infiltrates, myelo fibrosis/MDS, acute hypoxemia    Interval hx: Feels better.  Denies chest pain.  Has dry cough.  No hemoptysis  Still on 2 L of oxygen    ROS: No palpitation, no abdominal pain    Vitals:  Vitals:    11/10/23 0645 11/10/23 0654 11/10/23 0734 11/10/23 0847   BP:   125/74    BP Location:   Left arm    Patient Position:   Lying    Pulse: 99 102 107    Resp: 23  13    Temp:   98.1 °F (36.7 °C)    TempSrc:   Oral    SpO2: 95% 99% 95%    Weight:       Height:    162 cm (63.78\")           Body mass index is 25.83 kg/m².    Intake/Output Summary (Last 24 hours) at 11/10/2023 1215  Last data filed at 11/10/2023 0847  Gross per 24 hour   Intake 340 ml   Output 600 ml   Net -260 ml       Exam:  GEN:  No distress  Alert, oriented x 2, hard of hearing, little bit confused.   LUNGS: No wheezing, few scattered coarse breath sounds bilat, no use of accessory muscles  CV:  Normal S1S2, without murmur, no edema  ABD:  Non tender, no enlarged liver or masses      Scheduled meds:  famotidine, 20 mg, Oral, Daily  ipratropium-albuterol, 3 mL, Nebulization, Q8H - RT  latanoprost, 1 drop, Both Eyes, Nightly  lidocaine, 20 mL, Infiltration, Once  methylPREDNISolone sodium succinate, 40 mg, Intravenous, Q12H  metoprolol succinate XL, 25 mg, Oral, Daily  Momelotinib Dihydrochloride, 200 mg, Oral, Daily  saccharomyces boulardii, 250 mg, Oral, BID  senna-docusate sodium, 2 tablet, Oral, BID  sertraline, 100 mg, Oral, Daily      IV meds:                           Data Review:   I reviewed the patient's medications and new clinical results.            Results from last 7 days   Lab Units 11/10/23  0607 11/09/23  0214 11/09/23  0008 11/08/23  1825 11/08/23  0559 11/07/23  0506 11/06/23  2100 11/06/23  0611 11/05/23  0124 11/04/23  0626   SODIUM mmol/L " 142 138  --   --  138 138  --  138   < > 139   POTASSIUM mmol/L 3.9 4.3  --   --  3.4* 3.5  --  3.5   < > 3.7   CHLORIDE mmol/L 108* 107  --   --  104 107  --  109*   < > 106   CO2 mmol/L 24.3 22.0  --   --  24.6 23.6  --  20.9*   < > 19.0*   BUN mg/dL 17 17  --   --  7* 11  --  16   < > 42*   CREATININE mg/dL 0.82 1.07*  --   --  0.72 0.88  --  0.94   < > 1.79*   CALCIUM mg/dL 8.9 8.3*  --   --  8.3* 8.3*  --  8.1*   < > 8.2*   GLUCOSE mg/dL 116* 172*  --   --  96 97  --  95   < > 112*   WBC 10*3/mm3  --   --   --   --  46.36* 35.70*  --  41.43*  --   --    HEMOGLOBIN g/dL  --   --  7.8* 8.7* 9.6* 7.7*   < > 6.8*  --   --    PLATELETS 10*3/mm3  --   --   --   --  240 278  --  271  --   --    PROCALCITONIN ng/mL  --   --   --   --   --   --   --   --   --  0.09    < > = values in this interval not displayed.     Results from last 7 days   Lab Units 11/04/23  1603 11/03/23  1603   BLOODCX  No growth at 5 days  No growth at 5 days  --    URINECX   --  >100,000 CFU/mL Escherichia coli ESBL*         ASSESSMENT:   Right pneumothorax, iatrogenic after bronchoscopic biopsy  Bilateral pulmonary infiltrates  Hypotension  Lingula pleural-based opacity  Acute kidney injury  CAD (coronary artery disease)  Paroxysmal atrial fibrillation  Myeloproliferative disorder  Type 2 diabetes mellitus with circulatory disorder, without long-term current use of insulin  Hypertension   Personal history of transient ischemic attack (TIA), 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 deficits  Hypoxemia      PLAN:  She developed significant bleeding as well as pneumothorax on the right side during bronchoscopy with biopsies.  This was managed conservatively.  Hemostasis was achieved during bronchoscopy.  In discussion with interventional radiologist, CT scan after bronchoscopy showed only small pneumothorax of 10% or less therefore no chest tube was placed.  The patient has done quite well.  Continue supplemental oxygen and wean as tolerated.  So  far pathology report of the only 3 fragments of lung tissue biopsy obtained shows no pathology.  So far microbiology from her BAL does not show any respiratory pathogen.  Unclear why she has bilateral pulmonary infiltrates.  She will need a follow-up CT of the chest in about 6 to 8 weeks without contrast.  She can follow-up with Dr. Billings's nurse practitioner, Danisha Griffin in our pulmonary office within 8 weeks.  We will sign off      Raoul Alford MD  11/10/2023

## 2023-11-10 NOTE — PLAN OF CARE
Goal Outcome Evaluation:  Plan of Care Reviewed With: patient, daughter        Progress: improving  Outcome Evaluation: Pt was sitting on commode with PT on OT arrival. Toilet transfer completed with CGA. OT provides VC's and assist for body mechanics and hand placement. OT assisted overall MOD A for toileting. Pt ambulated to sink at wx level to complete grooming CGA with VC's for body mechanics and safety during transfers. Pt was pleasant and readily agreeable to working with OT on this date.

## 2023-11-10 NOTE — CASE MANAGEMENT/SOCIAL WORK
Continued Stay Note  [facility]     Patient Name: Catherine Boyer  MRN: [id number]  Today's Date: 11/10/2023    Admit Date: 11/2/2023    Plan: Return to [facility] assisted living with [facility]  vs SNF pending precert.   Discharge Plan       Row Name 11/10/23 1222       Plan    Plan Return to [facility] assisted living with [facility] vs SNF pending precert.    Plan Comments S/W pt and her dtrs Karen and Ila at bedside.  Disussed DC options depending on level of mobility.  Pt prefers to return to [facility] assisted living upon DC and continue with [facility] .   CCP disussed that they will expect her to be able to ambulate with minimal assist to return there.  If requires more assistance, she might need rehab, and would need insurance precert for that.  SNF facility list given and CMS Compare info on Medicare.gov disussed.  Per pt/ family request, referrals sent to [facility] and [facility] in case SNF is needed, evals pending.  Pt states she will work w/ PT going forward. ...........Cherry CONTRERAS/ CCP                   Discharge Codes    No documentation.                 Expected Discharge Date and Time       Expected Discharge Date Expected Discharge Time    Nov 13, 2023               Cherry Bowen RN

## 2023-11-10 NOTE — THERAPY TREATMENT NOTE
Patient Name: Catherine Boyer  : 1940    MRN: [id number]                              Today's Date: 11/10/2023       Admit Date: 2023    Visit Dx:     ICD-10-CM ICD-9-CM   1. Acute renal failure, unspecified acute renal failure type  N17.9 584.9   2. Acute abdominal pain  R10.9 789.00     338.19   3. Nausea vomiting and diarrhea  R11.2 787.91    R19.7 787.01   4. Pulmonary infiltrate  R91.8 793.19     Patient Active Problem List   Diagnosis    Acute on chronic diastolic heart failure    CAD (coronary artery disease)    Nonbacterial thrombotic endocarditis    Paroxysmal atrial fibrillation    Myeloproliferative disorder    Microcytic anemia    Type 2 diabetes mellitus with circulatory disorder, without long-term current use of insulin    GERD (gastroesophageal reflux disease)    Hypertension    Personal history of transient ischemic attack (TIA), 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 deficits    Porcelain gallbladder    Breast cancer    Atherosclerosis of abdominal aorta    APC (atrial premature contractions)    Moderate malnutrition    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    Elevated troponin    TIA (transient ischemic attack)    PUD (peptic ulcer disease)    Anemia    Tachycardia    Myelofibrosis    Normocytic anemia    Acute kidney injury    Pulmonary infiltrate    Iatrogenic pneumothorax     Past Medical History:   Diagnosis Date    Atherosclerosis of abdominal aorta 2023    Atrial fibrillation     Breast cancer     CAD (coronary artery disease)     NSTEMI 2022: 90% ostial LAD, 99% D1, 70% mid-distal LAD (medical therapy). She received two stents (2.5x18 and 2.5x26mm Finesse LEFTY) but I don't know which one went to which lesion.    Carotid atherosclerosis     Cholecystitis 2022    Added automatically from request for surgery [id number]    Chronic diastolic (congestive) heart failure     COVID 10/29/2022    GERD (gastroesophageal reflux disease)     Glaucoma     History of cataract     Hypertension      Microcytic anemia     per Dr. oleg pate office  note 6/30/22-dd    Myeloproliferative disorder     JAK2 positive    Nonbacterial thrombotic endocarditis     6/2021: 4x5mm vegetation on the ventricular surface of the anterior MV, negative blood cultures    Porcelain gallbladder     PUD (peptic ulcer disease)     TIA (transient ischemic attack)     Type 2 diabetes mellitus     Type 2 diabetes mellitus with circulatory disorder, without long-term current use of insulin 07/14/2022    Upper GI bleed      Past Surgical History:   Procedure Laterality Date    BREAST LUMPECTOMY  1999    BRONCHOSCOPY Bilateral 11/8/2023    Procedure: BRONCHOSCOPY UNDER FLUORO WITH BAL,  BIOPSIES; ACETYLCYSTEIN 4ML GIVEN;  Surgeon: Raoul Alford MD;  Location: [facility] ENDOSCOPY;  Service: Pulmonary;  Laterality: Bilateral;  PRE- PULMONARY INFILTRATES  POST- SAME    CARDIAC CATHETERIZATION N/A 09/02/2022    Procedure: Coronary angiography;  Surgeon: Guero Verde MD;  Location: [facility] CATH INVASIVE LOCATION;  Service: Cardiovascular;  Laterality: N/A;    CARDIAC CATHETERIZATION N/A 09/02/2022    Procedure: Stent LEFTY coronary;  Surgeon: Guero Verde MD;  Location: [facility] CATH INVASIVE LOCATION;  Service: Cardiovascular;  Laterality: N/A;    CATARACT EXTRACTION  2011    CHOLECYSTECTOMY      CHOLECYSTECTOMY WITH INTRAOPERATIVE CHOLANGIOGRAM N/A 11/28/2022    Procedure: CHOLECYSTECTOMY LAPAROSCOPIC INTRAOPERATIVE CHOLANGIOGRAM;  Surgeon: Dani Grover Jr., MD;  Location: [facility] MAIN OR;  Service: General;  Laterality: N/A;    COLONOSCOPY N/A 02/09/2023    Procedure: COLONOSCOPY TO CECUM & T.I.;  Surgeon: Guero Ferris MD;  Location: [facility] ENDOSCOPY;  Service: Gastroenterology;  Laterality: N/A;  PRE- MELENA, GI BLEED  POST- DIVERTICULOSIS, INT HEMORRHOIDS    ENDOSCOPY N/A 01/25/2023    Procedure: ESOPHAGOGASTRODUODENOSCOPY WITH BIOPSIES;  Surgeon: Charles Diaz MD;  Location: [facility] ENDOSCOPY;  Service: Gastroenterology;   Laterality: N/A;  pre: abd pain, nausea  post: hiatal hernia, mild gastritis, sloughing of the esophagus    ENTEROSCOPY SMALL BOWEL N/A 02/09/2023    Procedure: ENTEROSCOPY SMALL BOWEL;  Surgeon: Guero Ferris MD;  Location: [facility] ENDOSCOPY;  Service: Gastroenterology;  Laterality: N/A;  PRE- MELENA, GI BLEED  POST- HIATAL HERNIA    JOINT REPLACEMENT      UPPER GASTROINTESTINAL ENDOSCOPY        General Information       Row Name 11/10/23 1502          Physical Therapy Time and Intention    Document Type therapy note (daily note)  -     Mode of Treatment physical therapy  -       Row Name 11/10/23 1502          General Information    Existing Precautions/Restrictions fall;oxygen therapy device and L/min  4L O2  -       Row Name 11/10/23 1502          Cognition    Orientation Status (Cognition) oriented x 3  -               User Key  (r) = Recorded By, (t) = Taken By, (c) = Cosigned By      Initials Name Provider Type     Sarah Su PTA Physical Therapist Assistant                   Mobility       Row Name 11/10/23 1503          Bed Mobility    Bed Mobility supine-sit;sit-supine  -     Supine-Sit Sevier (Bed Mobility) minimum assist (75% patient effort)  -     Sit-Supine [place] (Bed Mobility) standby assist  -     Assistive Device (Bed Mobility) bed rails;head of bed elevated  -       Row Name 11/10/23 1503          Sit-Stand Transfer    Sit-Stand Sevier (Transfers) contact guard  -     Assistive Device (Sit-Stand Transfers) walker, front-wheeled  -       Row Name 11/10/23 1503          Gait/Stairs (Locomotion)    Sevier Level (Gait) contact guard  -     Assistive Device (Gait) walker, front-wheeled  -     Distance in Feet (Gait) 10ft x2  -     Deviations/Abnormal Patterns (Gait) jeanna decreased;stride length decreased;festinating/shuffling  -     Bilateral Gait Deviations forward flexed posture  -     Comment, (Gait/Stairs) cues to correct  posture  -SM               User Key  (r) = Recorded By, (t) = Taken By, (c) = Cosigned By      Initials Name Provider Type    Sarah Donovan PTA Physical Therapist Assistant                   Obj/Interventions       Row Name 11/10/23 1509          Motor Skills    Therapeutic Exercise --  seated AP, LAQ, marches x10 reps  -SM               User Key  (r) = Recorded By, (t) = Taken By, (c) = Cosigned By      Initials Name Provider Type    Sarah Donovan PTA Physical Therapist Assistant                   Goals/Plan    No documentation.                  Clinical Impression       Row Name 11/10/23 1510          Pain    Pretreatment Pain Rating 0/10 - no pain  -SM     Posttreatment Pain Rating 0/10 - no pain  -SM       Row Name 11/10/23 1510          Positioning and Restraints    Pre-Treatment Position in bed  -SM     Post Treatment Position bed  -SM     In Bed supine;call light within reach;encouraged to call for assist;exit alarm on;with family/caregiver  -               User Key  (r) = Recorded By, (t) = Taken By, (c) = Cosigned By      Initials Name Provider Type    Sarah Donovan PTA Physical Therapist Assistant                   Outcome Measures       Row Name 11/10/23 1511          How much help from another person do you currently need...    Turning from your back to your side while in flat bed without using bedrails? 3  -SM     Moving from lying on back to sitting on the side of a flat bed without bedrails? 3  -SM     Moving to and from a bed to a chair (including a wheelchair)? 3  -SM     Standing up from a chair using your arms (e.g., wheelchair, bedside chair)? 3  -SM     Climbing 3-5 steps with a railing? 1  -SM     To walk in hospital room? 3  -SM     AM-PAC 6 Clicks Score (PT) 16  -SM     Highest level of mobility 5 --> Static standing  -SM       Row Name 11/10/23 1511 11/10/23 1500       Functional Assessment    Outcome Measure Options AM-PAC 6 Clicks Basic Mobility (PT)  -SM  AM-PAC 6 Clicks Daily Activity (OT)  -KUMAR              User Key  (r) = Recorded By, (t) = Taken By, (c) = Cosigned By      Initials Name Provider Type    Sarah Donovan, CHERYL Physical Therapist Assistant    Christine Garcia, AVINASH Occupational Therapist                                 Physical Therapy Education       Title: PT OT SLP Therapies (Done)       Topic: Physical Therapy (Done)       Point: Mobility training (Done)       Learning Progress Summary             Patient Acceptance, E,TB,D, VU,NR by  at 11/10/2023 1511    Acceptance, E, VU by ML at 11/10/2023 0407    Acceptance, E, VU by ML at 11/9/2023 0432    Acceptance, E, VU by ML at 11/8/2023 0449    Acceptance, E, VU by ML at 11/7/2023 0517    Acceptance, E, VU by ML at 11/6/2023 0459    Acceptance, E, VU by SK at 11/3/2023 1142   Family Acceptance, E, VU by SK at 11/3/2023 1142                         Point: Home exercise program (Done)       Learning Progress Summary             Patient Acceptance, E,TB,D, VU,NR by  at 11/10/2023 1511    Acceptance, E, VU by ML at 11/10/2023 0407    Acceptance, E, VU by ML at 11/9/2023 0432    Acceptance, E, VU by ML at 11/8/2023 0449    Acceptance, E, VU by ML at 11/7/2023 0517    Acceptance, E, VU by ML at 11/6/2023 0459                         Point: Body mechanics (Done)       Learning Progress Summary             Patient Acceptance, E,TB,D, VU,NR by  at 11/10/2023 1511    Acceptance, E, VU by ML at 11/10/2023 0407    Acceptance, E, VU by ML at 11/9/2023 0432    Acceptance, E, VU by ML at 11/8/2023 0449    Acceptance, E, VU by ML at 11/7/2023 0517    Acceptance, E, VU by ML at 11/6/2023 0459    Acceptance, E, VU by SK at 11/3/2023 1142   Family Acceptance, E, VU by SK at 11/3/2023 1142                         Point: Precautions (Done)       Learning Progress Summary             Patient Acceptance, E,TB,D, VU,NR by SM at 11/10/2023 1511    Acceptance, E, VU by ML at 11/10/2023 0407    Acceptance, E, VU by ML  at 11/9/2023 0432    Acceptance, E, VU by  at 11/8/2023 0449    Acceptance, E, VU by  at 11/7/2023 0517    Acceptance, E, VU by  at 11/6/2023 0459    Acceptance, E, VU by SK at 11/3/2023 1142   Family Acceptance, E, VU by SK at 11/3/2023 1142                                         User Key       Initials Effective Dates Name Provider Type Discipline     03/07/18 -  Sarah Su PTA Physical Therapist Assistant PT     06/15/23 -  Isatu Johnson, RN Registered Nurse Nurse    SK 10/10/23 -  Nessa Barajas, PT Student PT Student PT                  PT Recommendation and Plan     Plan of Care Reviewed With: patient  Progress: improving  Outcome Evaluation: Pt tolerated treatment well this date. Required min A for supine to sit, then stood w/ CGA. Pt ambulated 10ft x2 w/ Rw and CGA, cues to correct posture and to increase foot clearance from ground. Pt completed a few seated LE exercises while sitting EOB, then returned to supine in bed w/ SBA. Encouraged pt to ambulate to BR w/ nsg during the day.     Time Calculation:         PT Charges       Row Name 11/10/23 1515             Time Calculation    Start Time 1407  -      Stop Time 1438  -      Time Calculation (min) 31 min  -      PT Received On 11/10/23  -      PT - Next Appointment 11/13/23  -                User Key  (r) = Recorded By, (t) = Taken By, (c) = Cosigned By      Initials Name Provider Type     Sarah Su PTA Physical Therapist Assistant                  Therapy Charges for Today       Code Description Service Date Service Provider Modifiers Qty    85731826269 HC GAIT TRAINING EA 15 MIN 11/10/2023 Sarah Su PTA GP 1    29514075338 HC PT THER PROC EA 15 MIN 11/10/2023 Sarah Su PTA GP 1            PT G-Codes  Outcome Measure Options: AM-PAC 6 Clicks Basic Mobility (PT)  AM-PAC 6 Clicks Score (PT): 16  AM-PAC 6 Clicks Score (OT): 14  PT Discharge Summary  Anticipated Discharge Disposition  (PT): skilled nursing facility    Sarah Su, PTA  11/10/2023

## 2023-11-10 NOTE — PLAN OF CARE
Goal Outcome Evaluation:  Plan of Care Reviewed With: patient met lying in bed, breathing has improve, she is alert and oriented X4. No fresh complain from her        Progress: no change

## 2023-11-10 NOTE — PLAN OF CARE
Goal Outcome Evaluation:  Plan of Care Reviewed With: patient        Progress: improving  Outcome Evaluation: Pt tolerated treatment well this date. Required min A for supine to sit, then stood w/ CGA. Pt ambulated 10ft x2 w/ Rw and CGA, cues to correct posture and to increase foot clearance from ground. Pt completed a few seated LE exercises while sitting EOB, then returned to supine in bed w/ SBA. Encouraged pt to ambulate to BR w/ nsg during the day.      Anticipated Discharge Disposition (PT): skilled nursing facility

## 2023-11-10 NOTE — PROGRESS NOTES
LOS: 8 days     Chief Complaint/ Reason for encounter: KARLI    Subjective   11/06/23 : Patient resting with family at bedside able provide some history  She is doing well overall no new complaints  Weight up today but she is not edematous, not short of breath  She is on 3 L oxygen which is near her home rate  Eating some, drinking well according to family    11/7: No new complaints, bronchoscopy with biopsy scheduled for today  Oral intake remains very low, no shortness of breath chest pain or edema    11/8: Events noted, patient developed a right pneumothorax after her bronchoscopy today.  Higher than normal bleeding during the procedure noted, CT-guided chest tube fortunately not needed  She has been intermittently hypotensive, currently 89/48, O2 requirements about the same, 3 L    11/9: She doing a little better today, fortunately did not need a chest tube  Oxygen requirements near baseline of around 3 L    11/10 family room, says her appetite is low and that she is tired, bp near goal       Medical history reviewed:  History of Present Illness    Subjective    History taken from: Patient and chart    Vital Signs  Temp:  [97.5 °F (36.4 °C)-99 °F (37.2 °C)] 98.1 °F (36.7 °C)  Heart Rate:  [] 107  Resp:  [13-23] 13  BP: (110-147)/(61-82) 125/74       Wt Readings from Last 1 Encounters:   11/10/23 0600 67.8 kg (149 lb 7.6 oz)   11/09/23 0600 67.7 kg (149 lb 4 oz)   11/08/23 0428 67.7 kg (149 lb 4 oz)   11/07/23 0600 67.6 kg (149 lb 0.5 oz)   11/06/23 0605 67.6 kg (149 lb 0.5 oz)   11/05/23 0507 62.6 kg (138 lb)   11/04/23 0539 62.6 kg (138 lb)   11/03/23 0556 63 kg (138 lb 14.2 oz)       Objective:  Vital signs: (most recent): Blood pressure 125/74, pulse 107, temperature 98.1 °F (36.7 °C), temperature source Oral, resp. rate 13, weight 67.8 kg (149 lb 7.6 oz), SpO2 95%.                Objective:  General Appearance:  Comfortable, well-appearing, in no acute distress and not in pain.  Awake, alert,  oriented  HEENT: Mucous membranes moist, no injury, oropharynx clear  Lungs:  Normal effort and normal respiratory rate.  Breath sounds clear to auscultation.  No  respiratory distress.  No rales, decreased breath sounds or rhonchi.    Heart: Normal rate.  Regular rhythm.  S1, S2 normal.  No murmur.   Abdomen: Abdomen is soft.  Bowel sounds are normal, no abdominal tenderness.  There is no rebound or guarding  Extremities: no edema of bilateral lower extremities  Neurological: No focal motor or sensory deficits, pupils reactive  Skin:  Warm and dry.  No rash or cyanosis.       Results Review:    Intake/Output:     Intake/Output Summary (Last 24 hours) at 11/10/2023 0957  Last data filed at 11/9/2023 2026  Gross per 24 hour   Intake 100 ml   Output 600 ml   Net -500 ml         DATA:  Radiology and Labs:  The following labs independently reviewed by me. Additional labs ordered for tomorrow a.m.  Interval notes, chart personally reviewed by me.   Old records independently reviewed showing normal baseline creatinine, history of CHF  Discussed with patient's family at bedside    Risk/ complexity of medical care/ medical decision making moderate risk, KARLI, fluid and electrolyte management    Labs:   Recent Results (from the past 24 hour(s))   Renal Function Panel    Collection Time: 11/10/23  6:07 AM    Specimen: Blood   Result Value Ref Range    Glucose 116 (H) 65 - 99 mg/dL    BUN 17 8 - 23 mg/dL    Creatinine 0.82 0.57 - 1.00 mg/dL    Sodium 142 136 - 145 mmol/L    Potassium 3.9 3.5 - 5.2 mmol/L    Chloride 108 (H) 98 - 107 mmol/L    CO2 24.3 22.0 - 29.0 mmol/L    Calcium 8.9 8.6 - 10.5 mg/dL    Albumin 3.4 (L) 3.5 - 5.2 g/dL    Phosphorus 3.4 2.5 - 4.5 mg/dL    Anion Gap 9.7 5.0 - 15.0 mmol/L    BUN/Creatinine Ratio 20.7 7.0 - 25.0    eGFR 70.2 >60.0 mL/min/1.73       Radiology:  Pertinent radiology studies were reviewed as described above      Medications have been reviewed separately in chart overview    Narrative &  Impression   Portable chest radiograph     HISTORY: Pneumothorax     TECHNIQUE: Single AP portable radiograph of the chest     COMPARISON: Chest radiograph 11/8/2023     IMPRESSION:  FINDINGS AND IMPRESSION:  Previously seen right pneumothorax has decreased in size resulting in  approximately 1.6 cm in pleural-parenchymal separation (previously 2.8  cm). There is moderate pulmonary opacification throughout bilateral  lungs, right greater than left, suggestive of multifocal pneumonia  and/or pulmonary edema in the appropriate clinical context and  correlation with patient history is recommended with follow-up chest CT  if clinically indicated. Given the somewhat masslike opacification  overlying the right lung, at least continued attention on follow-up  chest radiograph in 4 to 6 weeks is recommended to ensure appropriate  evolution/resolution and exclude any possibility of neoplasm.            ASSESSMENT:  Acute kidney injury, prerenal, rapidly improving and near baseline  Chronic diastolic CHF - EF 55%   Nausea vomiting and dehydration, better  Left lingular lung mass  Myeloproliferative disorder  Chronic leukocytosis  Atrial fibrillation  Diarrhea, slowly resolving  Acute on chronic anemia  Diabetes mellitus  UTI on ertapenem, ESBL E. coli  Lung mass, biopsy 11/8  Postop pneumothorax, had improved by the time chest tube was to be placed      DISCUSSION/PLAN:   Cr down at 0.8   Volume status acceptable  Oxygen requirements near baseline  We will hold off on diuretics today  We will plan to restart low-dose oral diuretics once intake improves      Continue to monitor electrolytes and volume closely  Discussed with family at bedside      Faustina Hanley MD  [facility]   Office phone number: [phone]  Answering service phone number: [phone]    11/10/23  09:57 EST    Dictation performed using Dragon dictation software

## 2023-11-10 NOTE — PROGRESS NOTES
HOSPITAL FOLLOW UP NOTE    Patient Name: Catherine Boyer  Patient : 1940        Date of Service:11/10/23  Provider of Service: ABEL Dolan  Place of Service: [facility]  Referral Provider: Jaimee Macias, *          Follow Up:  chest pain    Interval Hx: VSS,  status post bronch , small PTX post bronch, appears to be moving more air, afib/ SR with PACs 90's-110's on tele      OBJECTIVE  Temp:  [97.5 °F (36.4 °C)-99 °F (37.2 °C)] 98.1 °F (36.7 °C)  Heart Rate:  [] 107  Resp:  [13-23] 13  BP: (110-147)/(61-82) 125/74     Intake/Output Summary (Last 24 hours) at 11/10/2023 0815  Last data filed at 2023  Gross per 24 hour   Intake 100 ml   Output 600 ml   Net -500 ml     Body mass index is 25.83 kg/m².      23  0428 23  0600 11/10/23  0600   Weight: 67.7 kg (149 lb 4 oz) 67.7 kg (149 lb 4 oz) 67.8 kg (149 lb 7.6 oz)         Physical Exam:     General Appearance:    Alert, cooperative, in no acute distress, resting   Head:    Normocephalic, without obvious abnormality, atraumatic   Eyes:            Conjunctivae and sclerae normal, no   icterus, no pallor, corneas clear, PERRLA   Neck:   No adenopathy, supple, trachea midline, no thyromegaly, no   carotid bruit, no JVD   Lungs:    Slightly diminished in bases, respirations regular, shallow, on 3.5L NaO2    Heart:    Irregular rhythm/ rate, normal S1 and S2, no murmur, no gallop, no rub, no click   Chest Wall:    No abnormalities observed   Abdomen:     Normal bowel sounds, no masses, no organomegaly, soft, nontender, nondistended, no guarding, no rebound  tenderness   Extremities:   Moves all extremities well, no edema, no cyanosis, no redness   Pulses:   Pulses palpable and equal bilaterally.          CURRENT MEDS    Scheduled Meds:famotidine, 20 mg, Oral, Daily  ipratropium-albuterol, 3 mL, Nebulization, Q8H - RT  latanoprost, 1 drop, Both Eyes, Nightly  lidocaine, 20 mL, Infiltration,  Once  methylPREDNISolone sodium succinate, 40 mg, Intravenous, Q12H  metoprolol succinate XL, 25 mg, Oral, Daily  Momelotinib Dihydrochloride, 200 mg, Oral, Daily  saccharomyces boulardii, 250 mg, Oral, BID  senna-docusate sodium, 2 tablet, Oral, BID  sertraline, 100 mg, Oral, Daily      Continuous Infusions:         Lab Review:   Results from last 7 days   Lab Units 11/10/23  0607 11/09/23  0214   SODIUM mmol/L 142 138   POTASSIUM mmol/L 3.9 4.3   CHLORIDE mmol/L 108* 107   CO2 mmol/L 24.3 22.0   BUN mg/dL 17 17   CREATININE mg/dL 0.82 1.07*   GLUCOSE mg/dL 116* 172*   CALCIUM mg/dL 8.9 8.3*         Results from last 7 days   Lab Units 11/09/23  0008 11/08/23  1825 11/08/23  0559 11/07/23  0506   WBC 10*3/mm3  --   --  46.36* 35.70*   HEMOGLOBIN g/dL 7.8* 8.7* 9.6* 7.7*   HEMATOCRIT % 25.1* 26.1* 28.8* 24.2*   PLATELETS 10*3/mm3  --   --  240 278         Results from last 7 days   Lab Units 11/09/23  0008 11/05/23  0124   MAGNESIUM mg/dL 1.6 1.9     2D Echocardiogram 05/06/2023:    Left ventricular systolic function is low normal. Left ventricular ejection fraction appears to be 51 - 55%.    Left ventricular wall thickness is consistent with mild septal asymmetric hypertrophy.    The left atrial cavity is moderately dilated.    Mild aortic valve stenosis is present.    Peak velocity of the flow distal to the aortic valve is 202 cm/s. Aortic valve maximum pressure gradient is 16 mmHg. Aortic valve mean pressure gradient is 9 mmHg. Aortic valve dimensionless index is 0.8 .    Estimated right ventricular systolic pressure from tricuspid regurgitation is markedly elevated (>55 mmHg). Calculated right ventricular systolic pressure from tricuspid regurgitation is 62 mmHg.    Severe pulmonary hypertension is present.    Mild dilation of the ascending aorta is present. 3.7 cm.    Mild to moderate mitral regurgitation is noted.    ASSESSMENT & PLAN    Acute kidney injury    CAD (coronary artery disease)    Paroxysmal atrial  fibrillation    Myeloproliferative disorder    Type 2 diabetes mellitus with circulatory disorder, without long-term current use of insulin    Hypertension    Personal history of transient ischemic attack (TIA), 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 deficits    Myelofibrosis    Pulmonary infiltrate    Iatrogenic pneumothorax    1.  Chest pain: In setting of severe anemia.  No further chest pain/ discomfort  2.  Myeloproliferative disorder  3.  Paroxysmal atrial fibrillation: Afib controlled rate.  On beta blocker.  Eliquis on hold at this time  4.  Severe anemia: Hb down to 7.8 yesterday.  Received PRBC transfusions.  Increased bleeding during bronch. Eliquis and Plavix being held.  No Hb result this am.   5.  Coronary artery disease: Status post PCI to LAD and diagonal in February 2022, status post repeat stenting to diagonal secondary to in-stent restenosis September 2022, known  of RCA.    6.  Severe pulmonary hypertension : RVSP 62mm Hg, mild TR  7.  NSTEMI: Type II secondary to supply demand mismatch as evidenced by severe anemia  8.  Small right PTX: post bronchoscopy 11/08.    9.  Valvular disease: mild aortic stenosis, mild to moderate mitral regurgitation, mild tricuspid regurgitation    Would recommend restarting Eliquis at low dose when ok with all.  No current Hb this am.     Sherry Olmedo, APRN  11/10/23

## 2023-11-10 NOTE — PROGRESS NOTES
Name: Catherine Boyer ADMIT: 2023   : 1940  PCP: Kristi Moreau APRN    MRN: [id number] LOS: 8 days   AGE/SEX: 83 y.o. female  ROOM: [facility]     Subjective   No acute events. Patient denies new complaints. She feels better overall. Taking PO but appetite is not great.   Her daughter is at bedside.    Objective   Objective   Vital Signs  Temp:  [97.5 °F (36.4 °C)-98.2 °F (36.8 °C)] 98.1 °F (36.7 °C)  Heart Rate:  [] 97  Resp:  [13-23] 16  BP: (115-147)/(61-82) 125/74  SpO2:  [93 %-100 %] 100 %  on  Flow (L/min):  [2-4] 2;   Device (Oxygen Therapy): nasal cannula  Body mass index is 25.83 kg/m².  Physical Exam  Vitals and nursing note reviewed.   Constitutional:       General: She is not in acute distress.     Appearance: She is not toxic-appearing.      Comments: Elderly, frail   HENT:      Head: Normocephalic and atraumatic.      Nose: Nose normal.      Mouth/Throat:      Mouth: Mucous membranes are moist.      Pharynx: Oropharynx is clear.   Eyes:      Conjunctiva/sclera: Conjunctivae normal.      Pupils: Pupils are equal, round, and reactive to light.   Cardiovascular:      Rate and Rhythm: Normal rate and regular rhythm.   Pulmonary:      Effort: Pulmonary effort is normal.      Breath sounds: Examination of the right-lower field reveals decreased breath sounds. Examination of the left-lower field reveals decreased breath sounds. Decreased breath sounds present.   Abdominal:      General: Bowel sounds are normal. There is no distension.      Palpations: Abdomen is soft.      Tenderness: There is no abdominal tenderness.   Musculoskeletal:         General: No swelling or tenderness.      Cervical back: Neck supple.   Skin:     General: Skin is warm and dry.      Capillary Refill: Capillary refill takes less than 2 seconds.   Neurological:      General: No focal deficit present.      Mental Status: She is alert.      Comments: Hard of hearing       Results Review     I reviewed the patient's  "new clinical results.  Results from last 7 days   Lab Units 11/10/23  1300 11/09/23  0008 11/08/23  1825 11/08/23  0559 11/07/23  0506 11/06/23  2100 11/06/23  0611   WBC 10*3/mm3 58.19*  --   --  46.36* 35.70*  --  41.43*   HEMOGLOBIN g/dL 7.9* 7.8* 8.7* 9.6* 7.7*   < > 6.8*   PLATELETS 10*3/mm3 188  --   --  240 278  --  271    < > = values in this interval not displayed.     Results from last 7 days   Lab Units 11/10/23  0607 11/09/23  0214 11/08/23  0559 11/07/23  0506   SODIUM mmol/L 142 138 138 138   POTASSIUM mmol/L 3.9 4.3 3.4* 3.5   CHLORIDE mmol/L 108* 107 104 107   CO2 mmol/L 24.3 22.0 24.6 23.6   BUN mg/dL 17 17 7* 11   CREATININE mg/dL 0.82 1.07* 0.72 0.88   GLUCOSE mg/dL 116* 172* 96 97   Estimated Creatinine Clearance: 47.2 mL/min (by C-G formula based on SCr of 0.82 mg/dL).  Results from last 7 days   Lab Units 11/10/23  0607 11/09/23  0214 11/08/23  0559 11/07/23  0506   ALBUMIN g/dL 3.4* 3.4* 3.5 3.5     Results from last 7 days   Lab Units 11/10/23  0607 11/09/23  0214 11/09/23  0008 11/08/23  0559 11/07/23  1616 11/07/23  0506 11/05/23  0905 11/05/23  0124   CALCIUM mg/dL 8.9 8.3*  --  8.3*  --  8.3*   < > 7.9*   ALBUMIN g/dL 3.4* 3.4*  --  3.5  --  3.5   < >  --    MAGNESIUM mg/dL  --   --  1.6  --   --   --   --  1.9   PHOSPHORUS mg/dL 3.4 4.0  --  2.5 2.2* 1.8*   < >  --     < > = values in this interval not displayed.     Results from last 7 days   Lab Units 11/05/23  0905 11/05/23  0124 11/04/23  2247 11/04/23  1910 11/04/23  1603 11/04/23  0626   PROCALCITONIN ng/mL  --   --   --   --   --  0.09   LACTATE mmol/L 1.8 3.2* 3.1* 3.2*   < >  --     < > = values in this interval not displayed.     COVID19   Date Value Ref Range Status   11/08/2023 Not Detected Not Detected - Ref. Range Final   08/13/2023 Not Detected Not Detected - Ref. Range Final     No results found for: \"HGBA1C\", \"POCGLU\"  Results for orders placed or performed during the hospital encounter of 11/02/23   Blood Culture - " Blood, Arm, Right    Specimen: Arm, Right; Blood   Result Value Ref Range    Blood Culture No growth at 5 days          XR Chest 1 View  Narrative: Portable chest radiograph     HISTORY: Pneumothorax     TECHNIQUE: Single AP portable radiograph of the chest     COMPARISON: Chest radiograph 11/8/2023     Impression: FINDINGS AND IMPRESSION:  Previously seen right pneumothorax has decreased in size resulting in  approximately 1.6 cm in pleural-parenchymal separation (previously 2.8  cm). There is moderate pulmonary opacification throughout bilateral  lungs, right greater than left, suggestive of multifocal pneumonia  and/or pulmonary edema in the appropriate clinical context and  correlation with patient history is recommended with follow-up chest CT  if clinically indicated. Given the somewhat masslike opacification  overlying the right lung, at least continued attention on follow-up  chest radiograph in 4 to 6 weeks is recommended to ensure appropriate  evolution/resolution and exclude any possibility of neoplasm.     Cardiac silhouette is within normal its for size.     This report was finalized on 11/9/2023 1:13 AM by Dr. Cuba Lieberman M.D  on Workstation: [id number]       Scheduled Medications  famotidine, 20 mg, Oral, Daily  ipratropium-albuterol, 3 mL, Nebulization, Q8H - RT  latanoprost, 1 drop, Both Eyes, Nightly  lidocaine, 20 mL, Infiltration, Once  methylPREDNISolone sodium succinate, 40 mg, Intravenous, Q12H  metoprolol succinate XL, 25 mg, Oral, Daily  Momelotinib Dihydrochloride, 200 mg, Oral, Daily  saccharomyces boulardii, 250 mg, Oral, BID  senna-docusate sodium, 2 tablet, Oral, BID  sertraline, 100 mg, Oral, Daily    Infusions       Diet  Diet: Cardiac Diets, Diabetic Diets; Healthy Heart (2-3 Na+); Consistent Carbohydrate; Texture: Regular Texture (IDDSI 7); Fluid Consistency: Thin (IDDSI 0)    I reviewed Imaging, labs, microbiology, ECG/telemetry.       Assessment/Plan     Active Hospital Problems     Diagnosis  POA    **Acute kidney injury [N17.9]  Yes    Iatrogenic pneumothorax [J95.811]  No    Pulmonary infiltrate [R91.8]  Unknown    Myelofibrosis [D75.81]  Yes    Personal history of transient ischemic attack (TIA), 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   Acute Cystitis without Hematuria  - urine culture grew ESBL  - completed ertapenem    KARLI  - likely from volume depletion from reduced oral intake and diarrhea in the setting of diuretic treatment  - resolved with IVF, these are now stopped  - continue holding diuretics-may be able to restart these soon  - appreciate nephrology recs    Lung mass  - indeterminate, lingular   - s/p bronchoscopy 11/8/23 with biopsies, pathology showing no evidence of malignancy but only 3 biopsies (as opposed to 6-10) were obtained due to bleeding  - has small iatrogenic pneumothorax, improved with conservative treatment  - follow up chest CT scan without contrast in 6-8 weeks  - appreciate pulmonology recs, they have signed off-follow up with ABEL Crabtree for Dr. Billings, in 8 weeks    Aortic Stenosis/Chronic Diastolic CHF  - appears euvolemic  - continue to monitor volume status    Coronary artery disease  Paroxysmal atrial fibrillation  Hypertension  - Eliquis and Plavix held for bronchoscopy  - HR and BP controlled, continue metoprolol  - resume eliquis tomorrow, will leave off of plavix    Myeloproliferative disorder/Anemia  - has chronic leukocytosis  - s/p 1 unit of PRBCs 11/6/23 and 11/7/23 with good response  - monitor and transfuse as needed  - check iron stores, B12, and folate    SCDs  for dvt ppx   Full code.  Discussed with patient and nursing staff.  Anticipate discharge home with HH vs SNU facility in 1-2 days.    Guarded prognosis. Palliative care has evaluated and I reiterated her medical issues and prognosis to the patient and her daughter at bedside.    Kenton Meeks MD  [facility]  11/10/23  17:52 EST    Portions of this note have been copied and I have reviewed these. They are accurate as of 11/10/2023

## 2023-11-10 NOTE — PROGRESS NOTES
"Nutrition Services    Patient Name:  Catherine Boyer  YOB: 1940  MRN: [id number]  Admit Date:  11/2/2023    Assessment Date:  11/10/23    Summary:Follow up:  Pt with overall decreased po intake of meals and supplements but did drink a Boost GC this am at breakfast. 2 BM's today. Noted Palliative care notes. Will continue Boost GC BID and follow for adequate po intake of meals/ONS.     CLINICAL NUTRITION ASSESSMENT      Reason for Assessment Follow-up Protocol     Diagnosis/Problem   KARLI         Anthropometrics        Current Height  Current Weight  BMI kg/m2 Height: 162 cm (63.78\")  Weight: 67.8 kg (149 lb 7.6 oz) (11/10/23 0600)  Body mass index is 25.83 kg/m².   Adjusted BMI (if applicable)    BMI Category Overweight (25 - 29.9)   Ideal Body Weight (IBW) 119 lb (54.2 kg)   Usual Body Weight (UBW) Unsure   Weight Trend Other: fluctuations, possible gain noted       --  Estimated/Assessed Needs        Current Weight  Weight: 67.8 kg (149 lb 7.6 oz) (11/10/23 0600)       Energy Requirements    Weight for Calculation 149 lb (67.6 kg)   Method for Estimation  30-35 kcal/kg   EST Needs (kcal/day) 7953-2413       Protein Requirements    Weight for Calculation 149 lb (67.6 kg)   EST Protein Needs (g/kg) 1.2 - 1.5 gm/kg   EST Daily Needs (g/day)        Fluid Requirements     Method for Estimation 1 mL/kcal    EST Needs (mL/day)      Labs       Pertinent Labs    Results from last 7 days   Lab Units 11/10/23  0607 11/09/23  0214 11/08/23  0559   SODIUM mmol/L 142 138 138   POTASSIUM mmol/L 3.9 4.3 3.4*   CHLORIDE mmol/L 108* 107 104   CO2 mmol/L 24.3 22.0 24.6   BUN mg/dL 17 17 7*   CREATININE mg/dL 0.82 1.07* 0.72   CALCIUM mg/dL 8.9 8.3* 8.3*   GLUCOSE mg/dL 116* 172* 96     Results from last 7 days   Lab Units 11/10/23  1300 11/10/23  0607 11/09/23  0214 11/09/23  0008 11/05/23  0905 11/05/23  0124   MAGNESIUM mg/dL  --   --   --  1.6  --  1.9   PHOSPHORUS mg/dL  --  3.4   < >  --    < >  --  "   HEMOGLOBIN g/dL 7.9*  --   --  7.8*   < >  --    HEMATOCRIT % 24.6*  --   --  25.1*   < >  --    WBC 10*3/mm3 58.19*  --   --   --    < >  --    ALBUMIN g/dL  --  3.4*   < >  --    < >  --     < > = values in this interval not displayed.     Results from last 7 days   Lab Units 11/10/23  1300 11/08/23  0559 11/07/23  0506 11/06/23  0611   PLATELETS 10*3/mm3 188 240 278 271     COVID19   Date Value Ref Range Status   11/08/2023 Not Detected Not Detected - Ref. Range Final     Lab Results   Component Value Date    HGBA1C 6.60 (H) 09/29/2023          Medications           Scheduled Medications famotidine, 20 mg, Oral, Daily  ipratropium-albuterol, 3 mL, Nebulization, Q8H - RT  latanoprost, 1 drop, Both Eyes, Nightly  lidocaine, 20 mL, Infiltration, Once  methylPREDNISolone sodium succinate, 40 mg, Intravenous, Q12H  metoprolol succinate XL, 25 mg, Oral, Daily  Momelotinib Dihydrochloride, 200 mg, Oral, Daily  saccharomyces boulardii, 250 mg, Oral, BID  senna-docusate sodium, 2 tablet, Oral, BID  sertraline, 100 mg, Oral, Daily       Infusions     PRN Medications   acetaminophen    senna-docusate sodium **AND** polyethylene glycol **AND** bisacodyl **AND** bisacodyl    HYDROcodone-acetaminophen    melatonin    Morphine    nitroglycerin    ondansetron **OR** ondansetron    Phosphorus Replacement - Follow Nurse / BPA Driven Protocol    Potassium Replacement - Follow Nurse / BPA Driven Protocol    simethicone    Insert Peripheral IV **AND** sodium chloride     Physical Findings          General Findings alert, oriented, overweight, on oxygen therapy   Oral/Mouth Cavity tooth or teeth missing   Edema  no edema   Gastrointestinal abdominal pain, diarrhea, fecal incontinence, non-distended , last bowel movement: 11/10   Skin  bruising, excoriation, MASD, pressure injury: st II L leg, st II L 4th toe, location of wound: L medial plantar wound, bilateral abdomen wound   Tubes/Drains/Lines none   NFPE Not indicated at this  time   --  Current Nutrition Orders & Evaluation of Intake       Oral Nutrition     Food Allergies NKFA   Current PO Diet Diet: Cardiac Diets, Diabetic Diets; Healthy Heart (2-3 Na+); Consistent Carbohydrate; Texture: Regular Texture (IDDSI 7); Fluid Consistency: Thin (IDDSI 0)   Supplement Boost Glucose Control, BID   PO Evaluation     % PO Intake Overall poor po intake, but drank Boost at breakfast this am    Factors Affecting Intake: diarrhea   --  PES STATEMENT / NUTRITION DIAGNOSIS      Nutrition Dx Problem  Problem: Increased Nutrient Needs  Etiology: Medical Diagnosis - pressure injuries    Signs/Symptoms: Report/Observation     NUTRITION INTERVENTION / PLAN OF CARE      Intervention Goal(s) Maintain nutrition status, Reduce/improve symptoms, Meet estimated needs, Disease management/therapy, Establish PO intake, Tolerate PO , and No significant weight loss         RD Intervention/Action Continue to monitor, Care plan reviewed, and Recommend/order: ONS   --      Prescription/Orders:       PO Diet       Supplements Boost GC daily      Enteral Nutrition       Parenteral Nutrition    New Prescription Ordered? Continue same per protocol   --      Monitor/Evaluation Per protocol, PO intake, Supplement intake, Pertinent labs, Skin status, Symptoms   Discharge Plan/Needs Pending clinical course   --    RD to follow per protocol.      Electronically signed by:  Anh Marcelo RD  11/10/23 14:58 EST

## 2023-11-10 NOTE — THERAPY TREATMENT NOTE
Patient Name: Catherine Boyer  : 1940    MRN: [id number]                              Today's Date: 11/10/2023       Admit Date: 2023    Visit Dx:     ICD-10-CM ICD-9-CM   1. Acute renal failure, unspecified acute renal failure type  N17.9 584.9   2. Acute abdominal pain  R10.9 789.00     338.19   3. Nausea vomiting and diarrhea  R11.2 787.91    R19.7 787.01   4. Pulmonary infiltrate  R91.8 793.19     Patient Active Problem List   Diagnosis    Acute on chronic diastolic heart failure    CAD (coronary artery disease)    Nonbacterial thrombotic endocarditis    Paroxysmal atrial fibrillation    Myeloproliferative disorder    Microcytic anemia    Type 2 diabetes mellitus with circulatory disorder, without long-term current use of insulin    GERD (gastroesophageal reflux disease)    Hypertension    Personal history of transient ischemic attack (TIA), 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 deficits    Porcelain gallbladder    Breast cancer    Atherosclerosis of abdominal aorta    APC (atrial premature contractions)    Moderate malnutrition    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    Elevated troponin    TIA (transient ischemic attack)    PUD (peptic ulcer disease)    Anemia    Tachycardia    Myelofibrosis    Normocytic anemia    Acute kidney injury    Pulmonary infiltrate    Iatrogenic pneumothorax     Past Medical History:   Diagnosis Date    Atherosclerosis of abdominal aorta 2023    Atrial fibrillation     Breast cancer     CAD (coronary artery disease)     NSTEMI 2022: 90% ostial LAD, 99% D1, 70% mid-distal LAD (medical therapy). She received two stents (2.5x18 and 2.5x26mm Finesse LEFTY) but I don't know which one went to which lesion.    Carotid atherosclerosis     Cholecystitis 2022    Added automatically from request for surgery [id number]    Chronic diastolic (congestive) heart failure     COVID 10/29/2022    GERD (gastroesophageal reflux disease)     Glaucoma     History of cataract     Hypertension      Microcytic anemia     per Dr. oleg pate office  note 6/30/22-dd    Myeloproliferative disorder     JAK2 positive    Nonbacterial thrombotic endocarditis     6/2021: 4x5mm vegetation on the ventricular surface of the anterior MV, negative blood cultures    Porcelain gallbladder     PUD (peptic ulcer disease)     TIA (transient ischemic attack)     Type 2 diabetes mellitus     Type 2 diabetes mellitus with circulatory disorder, without long-term current use of insulin 07/14/2022    Upper GI bleed      Past Surgical History:   Procedure Laterality Date    BREAST LUMPECTOMY  1999    BRONCHOSCOPY Bilateral 11/8/2023    Procedure: BRONCHOSCOPY UNDER FLUORO WITH BAL,  BIOPSIES; ACETYLCYSTEIN 4ML GIVEN;  Surgeon: Raoul Alford MD;  Location: [facility] ENDOSCOPY;  Service: Pulmonary;  Laterality: Bilateral;  PRE- PULMONARY INFILTRATES  POST- SAME    CARDIAC CATHETERIZATION N/A 09/02/2022    Procedure: Coronary angiography;  Surgeon: Guero Verde MD;  Location: [facility] CATH INVASIVE LOCATION;  Service: Cardiovascular;  Laterality: N/A;    CARDIAC CATHETERIZATION N/A 09/02/2022    Procedure: Stent LEFTY coronary;  Surgeon: Guero Verde MD;  Location: [facility] CATH INVASIVE LOCATION;  Service: Cardiovascular;  Laterality: N/A;    CATARACT EXTRACTION  2011    CHOLECYSTECTOMY      CHOLECYSTECTOMY WITH INTRAOPERATIVE CHOLANGIOGRAM N/A 11/28/2022    Procedure: CHOLECYSTECTOMY LAPAROSCOPIC INTRAOPERATIVE CHOLANGIOGRAM;  Surgeon: Dani Grover Jr., MD;  Location: [facility] MAIN OR;  Service: General;  Laterality: N/A;    COLONOSCOPY N/A 02/09/2023    Procedure: COLONOSCOPY TO CECUM & T.I.;  Surgeon: Guero Ferris MD;  Location: [facility] ENDOSCOPY;  Service: Gastroenterology;  Laterality: N/A;  PRE- MELENA, GI BLEED  POST- DIVERTICULOSIS, INT HEMORRHOIDS    ENDOSCOPY N/A 01/25/2023    Procedure: ESOPHAGOGASTRODUODENOSCOPY WITH BIOPSIES;  Surgeon: Charles Diaz MD;  Location: [facility] ENDOSCOPY;  Service: Gastroenterology;   Laterality: N/A;  pre: abd pain, nausea  post: hiatal hernia, mild gastritis, sloughing of the esophagus    ENTEROSCOPY SMALL BOWEL N/A 02/09/2023    Procedure: ENTEROSCOPY SMALL BOWEL;  Surgeon: Guero Ferris MD;  Location: [facility] ENDOSCOPY;  Service: Gastroenterology;  Laterality: N/A;  PRE- MELENA, GI BLEED  POST- HIATAL HERNIA    JOINT REPLACEMENT      UPPER GASTROINTESTINAL ENDOSCOPY        General Information       Row Name 11/10/23 1437          OT Time and Intention    Document Type therapy note (daily note)  -KUMAR     Mode of Treatment individual therapy;occupational therapy  Pt in bathroom with PT on OT arrival. PT agrees OT to work on toileting.  -KUMAR       Row Name 11/10/23 1437          General Information    Patient Profile Reviewed yes  -KUMAR       Row Name 11/10/23 1437          Cognition    Orientation Status (Cognition) oriented to;person  -KUMAR       Row Name 11/10/23 1437          Safety Issues, Functional Mobility    Impairments Affecting Function (Mobility) balance;endurance/activity tolerance  -KUMAR     Comment, Safety Issues/Impairments (Mobility) gait belt, non skid socks worn  -KUMAR               User Key  (r) = Recorded By, (t) = Taken By, (c) = Cosigned By      Initials Name Provider Type    KUMAR Christine Fried OT Occupational Therapist                     Mobility/ADL's       Row Name 11/10/23 1440          Bed Mobility    Comment, (Bed Mobility) OOB when OT entered  -KUMAR       Row Name 11/10/23 1440          Transfers    Transfers toilet transfer;sit-stand transfer  -KUMAR     Comment, (Transfers) VC's for hand placement and body mechanics  -KUMAR       Row Name 11/10/23 1440          Sit-Stand Transfer    Sit-Stand [place] (Transfers) contact guard;verbal cues;nonverbal cues (demo/gesture)  -KUMAR     Assistive Device (Sit-Stand Transfers) walker, front-wheeled  -KUMAR       Row Name 11/10/23 1440          Toilet Transfer    Type (Toilet Transfer) sit-stand  -KUMAR     [place] Level (Toilet Transfer)  verbal cues;nonverbal cues (demo/gesture);contact guard  -KUMAR     Assistive Device (Toilet Transfer) walker, front-wheeled  -KUMAR       Row Name 11/10/23 1440          Functional Mobility    Functional Mobility- Ind. Level contact guard assist;verbal cues required  -KUMAR     Functional Mobility- Device walker, front-wheeled  -KUMAR     Functional Mobility- Safety Issues step length decreased  -KUMAR     Functional Mobility- Comment bathroom to sink  -KUMAR       Row Name 11/10/23 1440          Toileting Assessment/Training    [place] Level (Toileting) perform perineal hygiene;change pad/brief;verbal cues;nonverbal cues (demo/gesture);moderate assist (50% patient effort)  -KUMAR     Assistive Devices (Toileting) grab bar/safety frame  -KUMAR     Position (Toileting) unsupported sitting;supported standing  -KUMAR     Comment, (Toileting) OT assist with thoroughness with hygiene  -KUMAR       Row Name 11/10/23 1440          Grooming Assessment/Training    [place] Level (Grooming) wash face, hands;verbal cues;nonverbal cues (demo/gesture);set up;standby assist  -KUMAR     Position (Grooming) supported standing;sink side  -KUMAR     Comment, (Grooming) set-up to reach soap  -KUMAR               User Key  (r) = Recorded By, (t) = Taken By, (c) = Cosigned By      Initials Name Provider Type    Christine Garcia OT Occupational Therapist                   Obj/Interventions       Row Name 11/10/23 1447          Balance    Balance Assessment sitting static balance;standing static balance;standing dynamic balance  -KUMAR     Static Sitting Balance standby assist  -KUMAR     Static Standing Balance standby assist;verbal cues  -KUMAR     Dynamic Standing Balance contact guard;verbal cues  -KUMAR     Position/Device Used, Standing Balance supported;walker, front-wheeled  -KUMAR     Balance Interventions standing;supported;dynamic  -KUMAR     Comment, Balance cues for posture at wx to increase stability  -KUMAR               User Key  (r) = Recorded By, (t) = Taken By, (c) =  Cosigned By      Initials Name Provider Type    Christine Garcia OT Occupational Therapist                   Goals/Plan    No documentation.                  Clinical Impression       Row Name 11/10/23 1448          Pain Assessment    Pre/Posttreatment Pain Comment c/o burning pain on bottom during toileting, barrier cream applied  -KUMAR       Row Name 11/10/23 1448          Plan of Care Review    Plan of Care Reviewed With patient;daughter  -KUMAR     Progress improving  -KUMAR     Outcome Evaluation Pt was sitting on commode with PT on OT arrival. Toilet transfer completed with CGA. OT provides VC's and assist for body mechanics and hand placement. OT assisted overall MOD A for toileting. Pt ambulated to sink at wx level to complete grooming CGA with VC's for body mechanics and safety during transfers. Pt was pleasant and readily agreeable to working with OT on this date.  -KUMAR       Row Name 11/10/23 1448          Therapy Plan Review/Discharge Plan (OT)    Equipment Needs Upon Discharge (OT) walker, rolling  -KUMAR       Row Name 11/10/23 1448          Vital Signs    O2 Delivery Pre Treatment room air  -KUMAR     O2 Delivery Intra Treatment room air  -KUMAR     O2 Delivery Post Treatment room air  -KUMAR     Pre Patient Position Sitting  -KUMAR     Intra Patient Position Standing  -KUMAR     Post Patient Position Standing  -KUMAR       Row Name 11/10/23 1448          Positioning and Restraints    Pre-Treatment Position bathroom  -KUMAR     Post Treatment Position other  with PT  -KUMAR               User Key  (r) = Recorded By, (t) = Taken By, (c) = Cosigned By      Initials Name Provider Type    Christine Garcia OT Occupational Therapist                   Outcome Measures       Row Name 11/10/23 1500          How much help from another is currently needed...    Putting on and taking off regular lower body clothing? 2  -KUMAR     Bathing (including washing, rinsing, and drying) 2  -KUMAR     Toileting (which includes using toilet bed pan or urinal) 2   -KUMAR     Putting on and taking off regular upper body clothing 2  -KUMAR     Taking care of personal grooming (such as brushing teeth) 3  -KUMAR     Eating meals 3  -KUMAR     AM-PAC 6 Clicks Score (OT) 14  -KUMAR       Row Name 11/10/23 1500          Functional Assessment    Outcome Measure Options AM-PAC 6 Clicks Daily Activity (OT)  -KUMAR               User Key  (r) = Recorded By, (t) = Taken By, (c) = Cosigned By      Initials Name Provider Type    Christine Garcia OT Occupational Therapist                    Occupational Therapy Education       Title: PT OT SLP Therapies (Done)       Topic: Occupational Therapy (Done)       Point: ADL training (Done)       Description:   Instruct learner(s) on proper safety adaptation and remediation techniques during self care or transfers.   Instruct in proper use of assistive devices.                  Learning Progress Summary             Patient Acceptance, E, VU by ML at 11/10/2023 0407    Acceptance, E, VU by ML at 11/9/2023 0432    Acceptance, E, VU by ML at 11/8/2023 0449    Acceptance, E, VU by ML at 11/7/2023 0517    Acceptance, E, VU by ML at 11/6/2023 0459    Acceptance, E, VU by JW at 11/3/2023 1512    Comment: role of OT, plan of care, safety                         Point: Home exercise program (Done)       Description:   Instruct learner(s) on appropriate technique for monitoring, assisting and/or progressing therapeutic exercises/activities.                  Learning Progress Summary             Patient Acceptance, E, VU by ML at 11/10/2023 0407    Acceptance, E, VU by ML at 11/9/2023 0432    Acceptance, E, VU by ML at 11/8/2023 0449    Acceptance, E, VU by ML at 11/7/2023 0517    Acceptance, E, VU by ML at 11/6/2023 0459                         Point: Precautions (Done)       Description:   Instruct learner(s) on prescribed precautions during self-care and functional transfers.                  Learning Progress Summary             Patient Acceptance, E, VU by ML at  11/10/2023 0407    Acceptance, E, VU by ML at 11/9/2023 0432    Acceptance, E, VU by ML at 11/8/2023 0449    Acceptance, E, VU by  at 11/7/2023 0517    Acceptance, E, VU by  at 11/6/2023 0459    Acceptance, E, VU by JW at 11/3/2023 1512    Comment: role of OT, plan of care, safety                         Point: Body mechanics (Done)       Description:   Instruct learner(s) on proper positioning and spine alignment during self-care, functional mobility activities and/or exercises.                  Learning Progress Summary             Patient Acceptance, E, VU by ML at 11/10/2023 0407    Acceptance, E, VU by ML at 11/9/2023 0432    Acceptance, E, VU by ML at 11/8/2023 0449    Acceptance, E, VU by ML at 11/7/2023 0517    Acceptance, E, VU by  at 11/6/2023 0459    Acceptance, E, VU by JW at 11/3/2023 1512    Comment: role of OT, plan of care, safety                                         User Key       Initials Effective Dates Name Provider Type Discipline     06/10/21 -  Esperanza Lozada OT Occupational Therapist OT     06/15/23 -  Isatu Johnson RN Registered Nurse Nurse                  OT Recommendation and Plan     Plan of Care Review  Plan of Care Reviewed With: patient, daughter  Progress: improving  Outcome Evaluation: Pt was sitting on commode with PT on OT arrival. Toilet transfer completed with CGA. OT provides VC's and assist for body mechanics and hand placement. OT assisted overall MOD A for toileting. Pt ambulated to sink at wx level to complete grooming CGA with VC's for body mechanics and safety during transfers. Pt was pleasant and readily agreeable to working with OT on this date.     Time Calculation:         Time Calculation- OT       Row Name 11/10/23 1501             Time Calculation- OT    OT Start Time 1419  -KUMAR      OT Stop Time 1428  -KUMAR      OT Time Calculation (min) 9 min  -KUMAR      Total Timed Code Minutes- OT 9 minute(s)  -KUMAR      OT Received On 11/10/23  -KUMAR      OT - Next  Appointment 11/13/23  -KUMAR         Timed Charges    14884 - OT Self Care/Mgmt Minutes 9  -KUMAR         Total Minutes    Timed Charges Total Minutes 9  -KUMAR       Total Minutes 9  -KUMAR                User Key  (r) = Recorded By, (t) = Taken By, (c) = Cosigned By      Initials Name Provider Type    Christine Garcia OT Occupational Therapist                  Therapy Charges for Today       Code Description Service Date Service Provider Modifiers Qty    [id number] HC OT SELF CARE/MGMT/TRAIN EA 15 MIN 11/10/2023 Christine Fried OT GO 1                 Christine Fried OT  11/10/2023

## 2023-11-10 NOTE — PROGRESS NOTES
.            [facility]    Palliative Care Daily Progress Note   Chief complaint-follow up goals of care/advanced care planning, support for patient/family, and hospice referral/discussion    Code Status:   Code Status and Medical Interventions:   Ordered at: 11/02/23 1949     Code Status (Patient has no pulse and is not breathing):    CPR (Attempt to Resuscitate)     Medical Interventions (Patient has pulse or is breathing):    Full      Advanced Directives: Advance Directive Status: Patient has advance directive, copy in chart   Goals of Care: Ongoing.     S: Medical record reviewed. Events noted.  Patient resting in bed. Ate a few bites of breakfast this morning. Reports being tired but resting more. Her family is at bedside. I reviewed labs (biopsy still pending), medical notes, therapy notes and MAR. I spoke with RN as well.              Review of Systems   Constitutional: Positive for decreased appetite and malaise/fatigue.   HENT:  Negative for hoarse voice.    Cardiovascular:  Negative for chest pain.   Respiratory:  Negative for shortness of breath.    Gastrointestinal:  Negative for abdominal pain and nausea.   Neurological:  Positive for weakness.     Pain Assessment  Preferred Pain Scale: number (Numeric Rating Pain Scale)  Pain Location: chest  Pain Description: woke from sleep, crushing    O:     Intake/Output Summary (Last 24 hours) at 11/10/2023 0938  Last data filed at 11/9/2023 2026  Gross per 24 hour   Intake 100 ml   Output 600 ml   Net -500 ml       Diagnostics and current medications: Reviewed.    Current Facility-Administered Medications   Medication Dose Route Frequency Provider Last Rate Last Admin    acetaminophen (TYLENOL) tablet 650 mg  650 mg Oral Q4H PRN Raoul Alford MD   650 mg at 11/08/23 2151    sennosides-docusate (PERICOLACE) 8.6-50 MG per tablet 2 tablet  2 tablet Oral BID Raoul Alford MD   2 tablet at 11/09/23 2011    And     polyethylene glycol (MIRALAX) packet 17 g  17 g Oral Daily PRN Raoul Alford MD        And    bisacodyl (DULCOLAX) EC tablet 5 mg  5 mg Oral Daily PRN Raoul Alford MD        And    bisacodyl (DULCOLAX) suppository 10 mg  10 mg Rectal Daily PRN Raoul Alford MD        famotidine (PEPCID) tablet 20 mg  20 mg Oral Daily Raoul Alford MD   20 mg at 11/10/23 0847    HYDROcodone-acetaminophen (NORCO) 5-325 MG per tablet 1 tablet  1 tablet Oral Q6H PRN Raoul Alford MD   1 tablet at 11/04/23 1932    ipratropium-albuterol (DUO-NEB) nebulizer solution 3 mL  3 mL Nebulization Q8H - RT Raoul Alford MD   3 mL at 11/10/23 0645    latanoprost (XALATAN) 0.005 % ophthalmic solution 1 drop  1 drop Both Eyes Nightly Raoul Alford MD   1 drop at 11/09/23 2012    lidocaine (XYLOCAINE) 1 % injection 20 mL  20 mL Infiltration Once Kenton Meeks MD        melatonin tablet 3 mg  3 mg Oral Nightly PRN Raoul Alford MD   3 mg at 11/09/23 2011    methylPREDNISolone sodium succinate (SOLU-Medrol) injection 40 mg  40 mg Intravenous Q12H Raoul Alford MD   40 mg at 11/10/23 0547    metoprolol succinate XL (TOPROL-XL) 24 hr tablet 25 mg  25 mg Oral Daily Raoul Alford MD   25 mg at 11/10/23 0847    Momelotinib Dihydrochloride (OJJAARA) 200 mg  200 mg Oral Daily Raoul Alford MD   200 mg at 11/10/23 0847    morphine injection 2 mg  2 mg Intravenous Q3H PRN Raoul Alford MD        nitroglycerin (NITROSTAT) SL tablet 0.4 mg  0.4 mg Sublingual Q5 Min PRN Raoul Alford MD   0.4 mg at 11/08/23 2249    ondansetron (ZOFRAN) tablet 4 mg  4 mg Oral Q6H PRN Raoul Alford MD        Or    ondansetron (ZOFRAN) injection 4 mg  4 mg Intravenous Q6H PRRaoul Stephen MD   4 mg at 11/06/23 1156    Phosphorus Replacement - Follow Nurse / BPA Driven Protocol   Does not apply Raoul Hernandez MD        Potassium Replacement - Follow Nurse / BPA Driven Protocol   Does not apply Raoul Hernandez MD         saccharomyces boulardii (FLORASTOR) capsule 250 mg  250 mg Oral BID Raoul Alford MD   250 mg at 11/10/23 0847    sertraline (ZOLOFT) tablet 100 mg  100 mg Oral Daily Raoul Alford MD   100 mg at 11/10/23 0847    simethicone (MYLICON) chewable tablet 80 mg  80 mg Oral 4x Daily PRN Raoul Alford MD   80 mg at 11/08/23 2152    sodium chloride 0.9 % flush 10 mL  10 mL Intravenous PRN Raoul Alford MD              acetaminophen    senna-docusate sodium **AND** polyethylene glycol **AND** bisacodyl **AND** bisacodyl    HYDROcodone-acetaminophen    melatonin    Morphine    nitroglycerin    ondansetron **OR** ondansetron    Phosphorus Replacement - Follow Nurse / BPA Driven Protocol    Potassium Replacement - Follow Nurse / BPA Driven Protocol    simethicone    Insert Peripheral IV **AND** sodium chloride  Attest that current medications reviewed including but not limited to prescriptions, over-the counter, herbals and vitamin/mineral/dietary (nutritional) supplements for name, route of administration, type, dose and frequency and are current using all immediate resources available at time of dictation.    A:    /74 (BP Location: Left arm, Patient Position: Lying)   Pulse 107   Temp 98.1 °F (36.7 °C) (Oral)   Resp 13   Wt 67.8 kg (149 lb 7.6 oz)   SpO2 95%   BMI 25.83 kg/m²     Constitutional:       Appearance: Not in distress. Chronically ill-appearing.      Interventions: Nasal cannula in place.      Comments: NC  3 liters  Elderly     Pulmonary:      Effort: Pulmonary effort is normal.   Cardiovascular:      PMI at left midclavicular line. Normal rate. Regular rhythm.   Neurological:      Mental Status: Alert and oriented to person, place, and time.   Psychiatric:         Mood and Affect: Mood and affect normal.         Speech: Speech normal.         Behavior: Behavior is cooperative.         Thought Content: Thought content normal.         Patient status: Disease state: Controlled with current  treatments.  Functional status: Palliative Performance Scale Score: Performance 50% based on the following measures: Ambulation: Mainly sit or lie down, Activity and Evidence of Disease: Unable to do any work, extensive evidence of disease, Self-Care: Considerable assistance required,  Intake: Normal or reduced, LOC: Full or confusion   Nutritional status: Albumin 3.5 on 11/ 8/2023 Body mass index is 25.8 kg/m².    Family support: The patient receives support from her children and extended family..  Advance Directives: Advance Directive Status: Patient has advance directive, copy in chart   POA/Healthcare surrogate-Karyn .     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 Provide support with goals of care  2. [facility] referral pending           2.  Palliative care encounter  11/8/2023- The patient was sleeping upon my arrival to the room.  She was due to go  down for bronchoscopy and was tired from not much rest during the night.  There were two daughters (Karen and Ila) at bedside and requested to speak with me privately and patient agreeable.  The patient does have 5 children.  She does have durable power  paperwork on file that designates her daughter, Karen and then Karan.  The patient has resided at [facility] for about 1-1/2 years, which was after the death of her .  Ila is the main caregiver ,as her other siblings live out of state.  Both children have a very good understanding of patient's condition, both acute and chronic, which we reviewed.  They report over the last 3 months the patient has been  progressively declining with inability to care for herself.  All she wants to do is sleep she is extremely tired and also reduced appetite.  At this time they are uncertain what direction to take.  But they do want her to enjoy what time she has left them to be okay with that. They are willing to support her with any decision she makes.  I provided  both verbal and written information regarding palliative and hospice care.  Ila does acknowledge some caregiver fatigue. We will plan to meet tomorrow and speak with the patient to include her with decision making. I will plan to address CODE status/medical interventions. Of note, there was an attempt to have advance directive completed while inpatient and they patient got upset regarding the conversation per her daughters. I spoke with KARRI Guveara.      11/9/2023- I attempted to see patient twice this morning. The first time she was resting and did engage in conversation. The second time she requested that we have a discussion tomorrow because she is tired and not ready to talk about things today. She is aware of my role and wanting to have discussion regarding goals of care. I did meet privately with three of her children (Karen, Ila, and Greg). I provided information regarding palliative care and [facility] to Greg. He has good understanding of his mothers condition and very supportive her. We all discussed grief and the importance of patience right now with their mother. They find that she is very guarded her conversations and tells them she wants to live but her behavior says otherwise. They have some anxiety as they await pathology findings as well.  I will plan to follow up at 10 am tomorrow at patient request. I spoke with KARRI Stone.     11/10/2023- I spoke with patient privately about her goals of care. She has limited understanding of her medical conditions, which we reviewed hospital course in detail. She is aware of guarded prognosis. At  "this time, she is awaiting biopsy results from bronchoscopy. We discussed if she would be interested in treatment (chemo/immunotherapy/radiation) if malignancy noted and recommended and she states yes. She resides at [facility]  (1.5 years) and wants to return their. She has adjusted since being there, but does acknowledge grief from losing her spouse of 60 years. She enjoys being independent and reports having a good quality of life. I discussed CODE status and medical interventions and she tells me her doctor is suppose to make that decision and informed her that was her decision and we discussed risk vs benefits. She states she needs to think about it more, talk with her doctors and her family. So she will remain FULL code. I did provide information regarding palliative care, [facility] and [facility]. She is \"amenable\" to [facility]. We will hold on referral for now until closer to discharge. At this time, her goal is to continue living, return [facility]  so she can continue to write, draw and enjoy herself. We discussed the importance of nutrition and therapy (PT/OT) to support her goals. I did inform her of Ila's concerns from feeling overwhelm and consideration of additional resources.  I did have Karen Thorpe and Greg return after conversation and they will continue to have supportive conversation with patient at the request of the patient. She wanted to have it privately. She does request to talk with Dr Meeks, Dr. Reinoso and her daughter, Keri. I will reach out to Dr Meeks and have recommended to family to reach out to Dr Reinoso (outpatient) and their sister on patient behalf. I will plan to follow up Monday to facilitate further goals of care conversation. I spoke with KARRI Yao.       Thank you for this consult and allowing us to participate in patient's plan of care. Palliative Care Team will continue to follow patient.     Time spent:60 minutes spent reviewing medical and medication " records, assessing and examining patient, discussing with family, answering questions, providing some guidance about a plan and documentation of care, and coordinating care with other healthcare members, with > 50% time spent face to face.   30  minutes spent on advance care planning.    Eleonora Hercules, ABEL  11/10/2023  09:38 EST

## 2023-11-11 LAB
ALBUMIN SERPL-MCNC: 3.8 G/DL (ref 3.5–5.2)
ANION GAP SERPL CALCULATED.3IONS-SCNC: 11 MMOL/L (ref 5–15)
ANISOCYTOSIS BLD QL: ABNORMAL
BUN SERPL-MCNC: 14 MG/DL (ref 8–23)
BUN/CREAT SERPL: 19.4 (ref 7–25)
CALCIUM SPEC-SCNC: 9.3 MG/DL (ref 8.6–10.5)
CHLORIDE SERPL-SCNC: 105 MMOL/L (ref 98–107)
CO2 SERPL-SCNC: 25 MMOL/L (ref 22–29)
CREAT SERPL-MCNC: 0.72 MG/DL (ref 0.57–1)
DEPRECATED RDW RBC AUTO: 52.4 FL (ref 37–54)
EGFRCR SERPLBLD CKD-EPI 2021: 82.1 ML/MIN/1.73
ERYTHROCYTE [DISTWIDTH] IN BLOOD BY AUTOMATED COUNT: 15.9 % (ref 12.3–15.4)
FOLATE SERPL-MCNC: 7.66 NG/ML (ref 4.78–24.2)
FUNGITELL VALUE: <31.25 PG/ML
GLUCOSE SERPL-MCNC: 103 MG/DL (ref 65–99)
HCT VFR BLD AUTO: 25.9 % (ref 34–46.6)
HGB BLD-MCNC: 8.2 G/DL (ref 12–15.9)
HYPOCHROMIA BLD QL: ABNORMAL
IMP & REVIEW OF LAB RESULTS: NORMAL
LYMPHOCYTES # BLD MANUAL: 1.29 10*3/MM3 (ref 0.7–3.1)
LYMPHOCYTES NFR BLD MANUAL: 12 % (ref 5–12)
Lab: NORMAL
Lab: NORMAL
MAGNESIUM SERPL-MCNC: 1.6 MG/DL (ref 1.6–2.4)
MCH RBC QN AUTO: 29 PG (ref 26.6–33)
MCHC RBC AUTO-ENTMCNC: 31.7 G/DL (ref 31.5–35.7)
MCV RBC AUTO: 91.5 FL (ref 79–97)
METAMYELOCYTES NFR BLD MANUAL: 3 % (ref 0–0)
MONOCYTES # BLD: 7.76 10*3/MM3 (ref 0.1–0.9)
MYELOCYTES NFR BLD MANUAL: 3 % (ref 0–0)
NEUTROPHILS # BLD AUTO: 51.75 10*3/MM3 (ref 1.7–7)
NEUTROPHILS NFR BLD MANUAL: 80 % (ref 42.7–76)
PATHOLOGIST NAME: NORMAL
PHOSPHATE SERPL-MCNC: 2.6 MG/DL (ref 2.5–4.5)
PLAT MORPH BLD: NORMAL
PLATELET # BLD AUTO: 202 10*3/MM3 (ref 140–450)
PMV BLD AUTO: 11.5 FL (ref 6–12)
POLYCHROMASIA BLD QL SMEAR: ABNORMAL
POTASSIUM SERPL-SCNC: 3.8 MMOL/L (ref 3.5–5.2)
RBC # BLD AUTO: 2.83 10*6/MM3 (ref 3.77–5.28)
REFERENCE VALUE: NORMAL
RESULT: NEGATIVE
SODIUM SERPL-SCNC: 141 MMOL/L (ref 136–145)
VARIANT LYMPHS NFR BLD MANUAL: 2 % (ref 19.6–45.3)
VIT B12 BLD-MCNC: 1675 PG/ML (ref 211–946)
WBC MORPH BLD: NORMAL
WBC NRBC COR # BLD: 64.69 10*3/MM3 (ref 3.4–10.8)

## 2023-11-11 PROCEDURE — 94664 DEMO&/EVAL PT USE INHALER: CPT

## 2023-11-11 PROCEDURE — 94760 N-INVAS EAR/PLS OXIMETRY 1: CPT

## 2023-11-11 PROCEDURE — 80069 RENAL FUNCTION PANEL: CPT | Performed by: INTERNAL MEDICINE

## 2023-11-11 PROCEDURE — 83735 ASSAY OF MAGNESIUM: CPT | Performed by: STUDENT IN AN ORGANIZED HEALTH CARE EDUCATION/TRAINING PROGRAM

## 2023-11-11 PROCEDURE — 82746 ASSAY OF FOLIC ACID SERUM: CPT | Performed by: INTERNAL MEDICINE

## 2023-11-11 PROCEDURE — 85025 COMPLETE CBC W/AUTO DIFF WBC: CPT | Performed by: INTERNAL MEDICINE

## 2023-11-11 PROCEDURE — 85007 BL SMEAR W/DIFF WBC COUNT: CPT | Performed by: INTERNAL MEDICINE

## 2023-11-11 PROCEDURE — 94799 UNLISTED PULMONARY SVC/PX: CPT

## 2023-11-11 PROCEDURE — 94761 N-INVAS EAR/PLS OXIMETRY MLT: CPT

## 2023-11-11 PROCEDURE — 25010000002 METHYLPREDNISOLONE PER 40 MG: Performed by: INTERNAL MEDICINE

## 2023-11-11 PROCEDURE — 82607 VITAMIN B-12: CPT | Performed by: INTERNAL MEDICINE

## 2023-11-11 PROCEDURE — 25010000002 NA FERRIC GLUC CPLX PER 12.5 MG: Performed by: STUDENT IN AN ORGANIZED HEALTH CARE EDUCATION/TRAINING PROGRAM

## 2023-11-11 RX ORDER — ACETAMINOPHEN 325 MG/1
650 TABLET ORAL ONCE
Status: COMPLETED | OUTPATIENT
Start: 2023-11-11 | End: 2023-11-11

## 2023-11-11 RX ORDER — DIPHENHYDRAMINE HCL 12.5MG/5ML
12.5 LIQUID (ML) ORAL ONCE
Status: COMPLETED | OUTPATIENT
Start: 2023-11-11 | End: 2023-11-11

## 2023-11-11 RX ADMIN — Medication 250 MG: at 08:21

## 2023-11-11 RX ADMIN — Medication 250 MG: at 21:56

## 2023-11-11 RX ADMIN — METHYLPREDNISOLONE SODIUM SUCCINATE 40 MG: 40 INJECTION, POWDER, LYOPHILIZED, FOR SOLUTION INTRAMUSCULAR; INTRAVENOUS at 05:51

## 2023-11-11 RX ADMIN — SODIUM CHLORIDE 125 MG: 9 INJECTION, SOLUTION INTRAVENOUS at 12:38

## 2023-11-11 RX ADMIN — APIXABAN 5 MG: 5 TABLET, FILM COATED ORAL at 21:56

## 2023-11-11 RX ADMIN — DIPHENHYDRAMINE HYDROCHLORIDE 12.5 MG: 25 SOLUTION ORAL at 12:36

## 2023-11-11 RX ADMIN — FAMOTIDINE 20 MG: 20 TABLET ORAL at 08:22

## 2023-11-11 RX ADMIN — ACETAMINOPHEN 650 MG: 325 TABLET, FILM COATED ORAL at 12:36

## 2023-11-11 RX ADMIN — Medication 3 MG: at 21:56

## 2023-11-11 RX ADMIN — IPRATROPIUM BROMIDE AND ALBUTEROL SULFATE 3 ML: 2.5; .5 SOLUTION RESPIRATORY (INHALATION) at 06:55

## 2023-11-11 RX ADMIN — APIXABAN 5 MG: 5 TABLET, FILM COATED ORAL at 08:22

## 2023-11-11 RX ADMIN — METHYLPREDNISOLONE SODIUM SUCCINATE 40 MG: 40 INJECTION, POWDER, LYOPHILIZED, FOR SOLUTION INTRAMUSCULAR; INTRAVENOUS at 16:07

## 2023-11-11 RX ADMIN — LATANOPROST 1 DROP: 50 SOLUTION/ DROPS OPHTHALMIC at 21:59

## 2023-11-11 RX ADMIN — SERTRALINE 100 MG: 100 TABLET, FILM COATED ORAL at 08:22

## 2023-11-11 RX ADMIN — METOPROLOL SUCCINATE 25 MG: 25 TABLET, EXTENDED RELEASE ORAL at 08:22

## 2023-11-11 RX ADMIN — DOCUSATE SODIUM 50MG AND SENNOSIDES 8.6MG 2 TABLET: 8.6; 5 TABLET, FILM COATED ORAL at 08:22

## 2023-11-11 NOTE — PROGRESS NOTES
Name: Catherine Boyer ADMIT: 2023   : 1940  PCP: Kristi Moreau APRN    MRN: [id number] LOS: 9 days   AGE/SEX: 83 y.o. female  ROOM: [facility]     Subjective   Subjective   Patient seen and examined this morning.  Hospital day 9.  At time of my examination, patient is awake, alert, sitting up in bed.  She states that she feels okay today.  No acute events overnight.       Objective   Objective   Vital Signs  Temp:  [97.4 °F (36.3 °C)-98.6 °F (37 °C)] 97.4 °F (36.3 °C)  Heart Rate:  [] 117  Resp:  [16-18] 18  BP: (122-153)/(71-98) 145/89  SpO2:  [90 %-100 %] 93 %  on  Flow (L/min):  [2-3] 2;   Device (Oxygen Therapy): nasal cannula  Body mass index is 25.57 kg/m².  Physical Exam  Vitals and nursing note reviewed.   Constitutional:       General: She is not in acute distress.     Comments: Elderly, chronically ill-appearing   Cardiovascular:      Rate and Rhythm: Normal rate and regular rhythm.      Pulses: Normal pulses.      Heart sounds: Normal heart sounds.   Pulmonary:      Effort: Pulmonary effort is normal. No respiratory distress.      Breath sounds: Normal breath sounds. No wheezing.   Abdominal:      General: Bowel sounds are normal.      Palpations: Abdomen is soft.      Tenderness: There is no abdominal tenderness.   Skin:     General: Skin is warm and dry.      Coloration: Skin is pale.   Neurological:      Mental Status: She is alert.       Results Review     I reviewed the patient's new clinical results.  Results from last 7 days   Lab Units 23  0702 11/10/23  1300 23  0008 23  1825 23  0559 23  0506   WBC 10*3/mm3 64.69* 58.19*  --   --  46.36* 35.70*   HEMOGLOBIN g/dL 8.2* 7.9* 7.8* 8.7* 9.6* 7.7*   PLATELETS 10*3/mm3 202 188  --   --  240 278     Results from last 7 days   Lab Units 23  0702 11/10/23  0607 23  0214 23  0559   SODIUM mmol/L 141 142 138 138   POTASSIUM mmol/L 3.8 3.9 4.3 3.4*   CHLORIDE mmol/L 105 108* 107 104   CO2  "mmol/L 25.0 24.3 22.0 24.6   BUN mg/dL 14 17 17 7*   CREATININE mg/dL 0.72 0.82 1.07* 0.72   GLUCOSE mg/dL 103* 116* 172* 96   EGFR mL/min/1.73 82.1 70.2 51.0* 82.1     Results from last 7 days   Lab Units 11/11/23  0702 11/10/23  0607 11/09/23  0214 11/08/23  0559   ALBUMIN g/dL 3.8 3.4* 3.4* 3.5     Results from last 7 days   Lab Units 11/11/23  0702 11/10/23  0607 11/09/23  0214 11/09/23  0008 11/08/23  0559 11/05/23  0905 11/05/23  0124   CALCIUM mg/dL 9.3 8.9 8.3*  --  8.3*   < > 7.9*   ALBUMIN g/dL 3.8 3.4* 3.4*  --  3.5   < >  --    MAGNESIUM mg/dL 1.6  --   --  1.6  --   --  1.9   PHOSPHORUS mg/dL 2.6 3.4 4.0  --  2.5   < >  --     < > = values in this interval not displayed.     Results from last 7 days   Lab Units 11/05/23  0905 11/05/23  0124 11/04/23  2247 11/04/23  1910   LACTATE mmol/L 1.8 3.2* 3.1* 3.2*     No results found for: \"HGBA1C\", \"POCGLU\"    No radiology results for the last day    I have personally reviewed all medications:  Scheduled Medications  apixaban, 5 mg, Oral, Q12H  famotidine, 20 mg, Oral, Daily  ipratropium-albuterol, 3 mL, Nebulization, Q8H - RT  latanoprost, 1 drop, Both Eyes, Nightly  lidocaine, 20 mL, Infiltration, Once  methylPREDNISolone sodium succinate, 40 mg, Intravenous, Q12H  metoprolol succinate XL, 25 mg, Oral, Daily  Momelotinib Dihydrochloride, 200 mg, Oral, Daily  saccharomyces boulardii, 250 mg, Oral, BID  senna-docusate sodium, 2 tablet, Oral, BID  sertraline, 100 mg, Oral, Daily    Infusions   Diet  Diet: Cardiac Diets, Diabetic Diets; Healthy Heart (2-3 Na+); Consistent Carbohydrate; Texture: Regular Texture (IDDSI 7); Fluid Consistency: Thin (IDDSI 0)    I have personally reviewed:  [x]  Laboratory   [x]  Microbiology   [x]  Radiology   [x]  EKG/Telemetry  [x]  Cardiology/Vascular   []  Pathology    []  Records       Assessment/Plan     Active Hospital Problems    Diagnosis  POA    **Acute kidney injury [N17.9]  Yes    Iatrogenic pneumothorax [J95.811]  No    " Pulmonary infiltrate [R91.8]  Unknown    Myelofibrosis [D75.81]  Yes    Personal history of transient ischemic attack (TIA), 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       83 y.o. female admitted with Acute kidney injury.    Acute Cystitis without Hematuria  - Urine culture grew ESBL.  - Completed ertapenem     KARLI  - Likely from volume depletion from reduced oral intake and diarrhea in the setting of diuretic treatment, resolved with IVF, these are now stopped. Diuretics still being held at the moment, possibly restart soon based on clinical course.  - Order repeat BMP in AM for reassessment.  - Appreciate nephrology recs     Lung mass  - Indeterminate, lingular on imaging. s/p bronchoscopy 11/8/23 with biopsies, pathology showing no evidence of malignancy but only 3 biopsies (as opposed to 6-10) were obtained due to bleeding  - has small iatrogenic pneumothorax, improved with conservative treatment  - follow up chest CT scan without contrast in 6-8 weeks  - appreciate pulmonology recs, they have signed off-follow up with ABEL Crabtree for Dr. Billings, in 8 weeks     Aortic Stenosis/Chronic Diastolic CHF  - At present, patient appears stable from this perspective.  She does not have any evidence to suggest acute heart failure exacerbation.  No acute intervention warranted, however, do need to closely monitor.  - Strict I/O, daily weights, telemetry, pulse oximetry, low sodium diet.  - Closely monitor volume status.    Coronary artery disease  Paroxysmal atrial fibrillation  Hypertension  - Eliquis and Plavix held for bronchoscopy  - HR and  BP relatively stable and controlled, continue metoprolol.  - Eliquis resumed. Continue to hold Plavix.     Myeloproliferative disorder/Anemia  Functional iron deficiency  Leukocytosis  - Has chronic leukocytosis, values elevated on most recent labs, however, relatively stable from prior.  - s/p 1 unit of PRBCs 11/6/23 and 11/7/23 with good response. Iron studies showing mixed picture, but does meet criteria for functional iron deficiency with history of CHF and ferritin <500 and TSAT <20. B12 elevated, folate normal.   - Hemoglobin low on most recent labs, however, stable from prior.   - Order x1 dose of Ferrlecit 125 mg with pre-medications.  - Order repeat CBC in AM for reassessment. Continue to monitor, transfuse for hemoglobin <7.      All workup, problems and plans new to me today. First day taking care of patient.    SCDs for DVT prophylaxis.  Full code.  Discussed with patient and nursing staff.  Anticipate discharge to SNU facility once arrangements have been made.  Discussed with nursing today he was going to reach out to [facility] and see if they can begin process for SNF.      Reuben Pierce DO  [facility]  11/11/23

## 2023-11-11 NOTE — PLAN OF CARE
Problem: Adult Inpatient Plan of Care  Goal: Plan of Care Review  Outcome: Ongoing, Progressing  Goal: Patient-Specific Goal (Individualized)  Outcome: Ongoing, Progressing  Goal: Absence of Hospital-Acquired Illness or Injury  Outcome: Ongoing, Progressing  Intervention: Identify and Manage Fall Risk  Recent Flowsheet Documentation  Taken 11/11/2023 1400 by Ismael Lowery RN  Safety Promotion/Fall Prevention:   room organization consistent   safety round/check completed  Taken 11/11/2023 1200 by Ismael Lowery RN  Safety Promotion/Fall Prevention:   safety round/check completed   room organization consistent  Taken 11/11/2023 1000 by Ismael Lowery RN  Safety Promotion/Fall Prevention:   safety round/check completed   room organization consistent  Taken 11/11/2023 0800 by Ismael Lowery RN  Safety Promotion/Fall Prevention:   safety round/check completed   room organization consistent  Intervention: Prevent Skin Injury  Recent Flowsheet Documentation  Taken 11/11/2023 1400 by Ismael Lowery RN  Body Position: position changed independently  Taken 11/11/2023 0800 by Ismael Lowery RN  Body Position: position changed independently  Skin Protection: adhesive use limited  Intervention: Prevent and Manage VTE (Venous Thromboembolism) Risk  Recent Flowsheet Documentation  Taken 11/11/2023 0800 by Ismael Lowery RN  Activity Management: activity encouraged  Intervention: Prevent Infection  Recent Flowsheet Documentation  Taken 11/11/2023 1400 by Ismael Lowery RN  Infection Prevention:   single patient room provided   rest/sleep promoted  Taken 11/11/2023 1200 by Ismael Lowery RN  Infection Prevention:   single patient room provided   rest/sleep promoted  Taken 11/11/2023 1000 by Ismael Lowery RN  Infection Prevention:   single patient room provided   rest/sleep promoted  Taken 11/11/2023 0800 by Ismael Lowery RN  Infection Prevention:   single patient room provided   rest/sleep promoted  Goal: Optimal  Comfort and Wellbeing  Outcome: Ongoing, Progressing  Intervention: Provide Person-Centered Care  Recent Flowsheet Documentation  Taken 11/11/2023 1400 by Ismael Lowery RN  Trust Relationship/Rapport:   care explained   questions encouraged  Taken 11/11/2023 0800 by Ismael Lowery RN  Trust Relationship/Rapport:   care explained   questions encouraged  Goal: Readiness for Transition of Care  Outcome: Ongoing, Progressing     Problem: Adjustment to Illness (Sepsis/Septic Shock)  Goal: Optimal Coping  Outcome: Ongoing, Progressing  Intervention: Optimize Psychosocial Adjustment to Illness  Recent Flowsheet Documentation  Taken 11/11/2023 1400 by Ismael Lowery RN  Family/Support System Care: support provided  Taken 11/11/2023 0800 by Ismael Lowery RN  Family/Support System Care: support provided     Problem: Bleeding (Sepsis/Septic Shock)  Goal: Absence of Bleeding  Outcome: Ongoing, Progressing     Problem: Glycemic Control Impaired (Sepsis/Septic Shock)  Goal: Blood Glucose Level Within Desired Range  Outcome: Ongoing, Progressing  Intervention: Optimize Glycemic Control  Recent Flowsheet Documentation  Taken 11/11/2023 1400 by Ismael Lowery RN  Glycemic Management: blood glucose monitored  Taken 11/11/2023 0800 by Ismael Lowery RN  Glycemic Management: blood glucose monitored     Problem: Infection Progression (Sepsis/Septic Shock)  Goal: Absence of Infection Signs and Symptoms  Outcome: Ongoing, Progressing  Intervention: Initiate Sepsis Management  Recent Flowsheet Documentation  Taken 11/11/2023 1400 by Ismael Lowery RN  Infection Prevention:   single patient room provided   rest/sleep promoted  Isolation Precautions:   precautions maintained   contact  Taken 11/11/2023 1200 by Ismael Lowery RN  Infection Prevention:   single patient room provided   rest/sleep promoted  Isolation Precautions:   precautions maintained   contact  Taken 11/11/2023 1000 by Ismael Lowery RN  Infection Prevention:    single patient room provided   rest/sleep promoted  Isolation Precautions:   precautions maintained   contact  Taken 11/11/2023 0800 by Ismael Lowery RN  Infection Prevention:   single patient room provided   rest/sleep promoted  Isolation Precautions:   precautions maintained   contact  Intervention: Promote Recovery  Recent Flowsheet Documentation  Taken 11/11/2023 0800 by Ismael Lowery RN  Activity Management: activity encouraged     Problem: Nutrition Impaired (Sepsis/Septic Shock)  Goal: Optimal Nutrition Intake  Outcome: Ongoing, Progressing     Problem: Fall Injury Risk  Goal: Absence of Fall and Fall-Related Injury  Outcome: Ongoing, Progressing  Intervention: Identify and Manage Contributors  Recent Flowsheet Documentation  Taken 11/11/2023 1400 by Ismael Lowery RN  Medication Review/Management: medications reviewed  Taken 11/11/2023 1200 by Ismael Lowery RN  Medication Review/Management: medications reviewed  Taken 11/11/2023 1000 by Ismael Lowery RN  Medication Review/Management: medications reviewed  Taken 11/11/2023 0800 by Ismael Lowery RN  Medication Review/Management: medications reviewed  Intervention: Promote Injury-Free Environment  Recent Flowsheet Documentation  Taken 11/11/2023 1400 by Ismael Lowery RN  Safety Promotion/Fall Prevention:   room organization consistent   safety round/check completed  Taken 11/11/2023 1200 by Ismael Lowery RN  Safety Promotion/Fall Prevention:   safety round/check completed   room organization consistent  Taken 11/11/2023 1000 by Ismael Lowery RN  Safety Promotion/Fall Prevention:   safety round/check completed   room organization consistent  Taken 11/11/2023 0800 by Ismael Lowery RN  Safety Promotion/Fall Prevention:   safety round/check completed   room organization consistent     Problem: Fluid and Electrolyte Imbalance (Acute Kidney Injury/Impairment)  Goal: Fluid and Electrolyte Balance  Outcome: Ongoing, Progressing     Problem: Oral  Intake Inadequate (Acute Kidney Injury/Impairment)  Goal: Optimal Nutrition Intake  Outcome: Ongoing, Progressing     Problem: Renal Function Impairment (Acute Kidney Injury/Impairment)  Goal: Effective Renal Function  Outcome: Ongoing, Progressing  Intervention: Monitor and Support Renal Function  Recent Flowsheet Documentation  Taken 11/11/2023 1400 by Ismael Lowery RN  Medication Review/Management: medications reviewed  Taken 11/11/2023 1200 by Ismael Lowery RN  Medication Review/Management: medications reviewed  Taken 11/11/2023 1000 by Ismael Lowery RN  Medication Review/Management: medications reviewed  Taken 11/11/2023 0800 by Ismael Lowery RN  Medication Review/Management: medications reviewed     Problem: Skin Injury Risk Increased  Goal: Skin Health and Integrity  Outcome: Ongoing, Progressing  Intervention: Optimize Skin Protection  Recent Flowsheet Documentation  Taken 11/11/2023 1400 by Ismael Lowery RN  Head of Bed (HOB) Positioning: HOB at 30-45 degrees  Taken 11/11/2023 0800 by Ismael Lowery RN  Pressure Reduction Techniques:   frequent weight shift encouraged   weight shift assistance provided  Head of Bed (HOB) Positioning: HOB at 30 degrees  Pressure Reduction Devices: alternating pressure pump (ADD)  Skin Protection: adhesive use limited   Goal Outcome Evaluation:

## 2023-11-11 NOTE — PROGRESS NOTES
"   LOS: 9 days     Chief Complaint/ Reason for encounter: KARLI    Subjective   11/06/23 : Patient resting with family at bedside able provide some history  She is doing well overall no new complaints  Weight up today but she is not edematous, not short of breath  She is on 3 L oxygen which is near her home rate  Eating some, drinking well according to family    11/7: No new complaints, bronchoscopy with biopsy scheduled for today  Oral intake remains very low, no shortness of breath chest pain or edema    11/8: Events noted, patient developed a right pneumothorax after her bronchoscopy today.  Higher than normal bleeding during the procedure noted, CT-guided chest tube fortunately not needed  She has been intermittently hypotensive, currently 89/48, O2 requirements about the same, 3 L    11/9: She doing a little better today, fortunately did not need a chest tube  Oxygen requirements near baseline of around 3 L    11/10 family room, says her appetite is low and that she is tired, bp near goal     11/11: Resting, no new complaints or events, no shortness of breath or chest pain overnight  Good appetite    Medical history reviewed:  History of Present Illness    Subjective    History taken from: Patient and chart    Vital Signs  Temp:  [97.4 °F (36.3 °C)-98.6 °F (37 °C)] 97.4 °F (36.3 °C)  Heart Rate:  [] 117  Resp:  [16-18] 18  BP: (122-153)/(71-98) 145/89       Wt Readings from Last 1 Encounters:   11/11/23 0545 67.1 kg (147 lb 14.9 oz)   11/10/23 0600 67.8 kg (149 lb 7.6 oz)   11/09/23 0600 67.7 kg (149 lb 4 oz)   11/08/23 0428 67.7 kg (149 lb 4 oz)   11/07/23 0600 67.6 kg (149 lb 0.5 oz)   11/06/23 0605 67.6 kg (149 lb 0.5 oz)   11/05/23 0507 62.6 kg (138 lb)   11/04/23 0539 62.6 kg (138 lb)   11/03/23 0556 63 kg (138 lb 14.2 oz)       Objective:  Vital signs: (most recent): Blood pressure 145/89, pulse 117, temperature 97.4 °F (36.3 °C), temperature source Oral, resp. rate 18, height 162 cm (63.78\"), weight " 67.1 kg (147 lb 14.9 oz), SpO2 93%.                Objective:  General Appearance:  Comfortable, well-appearing, in no acute distress and not in pain.  Awake, alert, oriented  HEENT: Mucous membranes moist, no injury, oropharynx clear  Lungs:  Normal effort and normal respiratory rate.  Breath sounds clear to auscultation.  No  respiratory distress.  No rales, decreased breath sounds or rhonchi.    Heart: Normal rate.  Regular rhythm.  S1, S2 normal.  No murmur.   Abdomen: Abdomen is soft.  Bowel sounds are normal, no abdominal tenderness.  There is no rebound or guarding  Extremities: no edema of bilateral lower extremities  Neurological: No focal motor or sensory deficits, pupils reactive  Skin:  Warm and dry.  No rash or cyanosis.       Results Review:    Intake/Output:     Intake/Output Summary (Last 24 hours) at 11/11/2023 1154  Last data filed at 11/11/2023 0800  Gross per 24 hour   Intake 360 ml   Output 600 ml   Net -240 ml         DATA:  Radiology and Labs:  The following labs independently reviewed by me. Additional labs ordered for tomorrow a.m.  Interval notes, chart personally reviewed by me.   Old records independently reviewed showing normal baseline creatinine, history of CHF  Discussed with patient's family at bedside    Risk/ complexity of medical care/ medical decision making moderate risk, KARLI, fluid and electrolyte management    Labs:   Recent Results (from the past 24 hour(s))   CBC Auto Differential    Collection Time: 11/10/23  1:00 PM    Specimen: Blood   Result Value Ref Range    WBC 58.19 (C) 3.40 - 10.80 10*3/mm3    RBC 2.65 (L) 3.77 - 5.28 10*6/mm3    Hemoglobin 7.9 (L) 12.0 - 15.9 g/dL    Hematocrit 24.6 (L) 34.0 - 46.6 %    MCV 92.8 79.0 - 97.0 fL    MCH 29.8 26.6 - 33.0 pg    MCHC 32.1 31.5 - 35.7 g/dL    RDW 16.1 (H) 12.3 - 15.4 %    RDW-SD 54.4 (H) 37.0 - 54.0 fl    MPV 11.0 6.0 - 12.0 fL    Platelets 188 140 - 450 10*3/mm3   Manual Differential    Collection Time: 11/10/23  1:00 PM     Specimen: Blood   Result Value Ref Range    Neutrophil % 90.0 (H) 42.7 - 76.0 %    Lymphocyte % 3.0 (L) 19.6 - 45.3 %    Monocyte % 4.0 (L) 5.0 - 12.0 %    Metamyelocyte % 2.0 (H) 0.0 - 0.0 %    Myelocyte % 1.0 (H) 0.0 - 0.0 %    Neutrophils Absolute 52.37 (H) 1.70 - 7.00 10*3/mm3    Lymphocytes Absolute 1.75 0.70 - 3.10 10*3/mm3    Monocytes Absolute 2.33 (H) 0.10 - 0.90 10*3/mm3    RBC Morphology Normal Normal    WBC Morphology Normal Normal    Platelet Morphology Normal Normal   Renal Function Panel    Collection Time: 11/11/23  7:02 AM    Specimen: Blood   Result Value Ref Range    Glucose 103 (H) 65 - 99 mg/dL    BUN 14 8 - 23 mg/dL    Creatinine 0.72 0.57 - 1.00 mg/dL    Sodium 141 136 - 145 mmol/L    Potassium 3.8 3.5 - 5.2 mmol/L    Chloride 105 98 - 107 mmol/L    CO2 25.0 22.0 - 29.0 mmol/L    Calcium 9.3 8.6 - 10.5 mg/dL    Albumin 3.8 3.5 - 5.2 g/dL    Phosphorus 2.6 2.5 - 4.5 mg/dL    Anion Gap 11.0 5.0 - 15.0 mmol/L    BUN/Creatinine Ratio 19.4 7.0 - 25.0    eGFR 82.1 >60.0 mL/min/1.73   Vitamin B12    Collection Time: 11/11/23  7:02 AM    Specimen: Blood   Result Value Ref Range    Vitamin B-12 1,675 (H) 211 - 946 pg/mL   Folate    Collection Time: 11/11/23  7:02 AM    Specimen: Blood   Result Value Ref Range    Folate 7.66 4.78 - 24.20 ng/mL   CBC Auto Differential    Collection Time: 11/11/23  7:02 AM    Specimen: Blood   Result Value Ref Range    WBC 64.69 (C) 3.40 - 10.80 10*3/mm3    RBC 2.83 (L) 3.77 - 5.28 10*6/mm3    Hemoglobin 8.2 (L) 12.0 - 15.9 g/dL    Hematocrit 25.9 (L) 34.0 - 46.6 %    MCV 91.5 79.0 - 97.0 fL    MCH 29.0 26.6 - 33.0 pg    MCHC 31.7 31.5 - 35.7 g/dL    RDW 15.9 (H) 12.3 - 15.4 %    RDW-SD 52.4 37.0 - 54.0 fl    MPV 11.5 6.0 - 12.0 fL    Platelets 202 140 - 450 10*3/mm3   Magnesium    Collection Time: 11/11/23  7:02 AM    Specimen: Blood   Result Value Ref Range    Magnesium 1.6 1.6 - 2.4 mg/dL   Manual Differential    Collection Time: 11/11/23  7:02 AM    Specimen: Blood    Result Value Ref Range    Neutrophil % 80.0 (H) 42.7 - 76.0 %    Lymphocyte % 2.0 (L) 19.6 - 45.3 %    Monocyte % 12.0 5.0 - 12.0 %    Metamyelocyte % 3.0 (H) 0.0 - 0.0 %    Myelocyte % 3.0 (H) 0.0 - 0.0 %    Neutrophils Absolute 51.75 (H) 1.70 - 7.00 10*3/mm3    Lymphocytes Absolute 1.29 0.70 - 3.10 10*3/mm3    Monocytes Absolute 7.76 (H) 0.10 - 0.90 10*3/mm3    Anisocytosis Mod/2+ None Seen    Hypochromia Slight/1+ None Seen    Polychromasia Slight/1+ None Seen    WBC Morphology Normal Normal    Platelet Morphology Normal Normal       Radiology:  Pertinent radiology studies were reviewed as described above      Medications have been reviewed separately in chart overview    Narrative & Impression   Portable chest radiograph     HISTORY: Pneumothorax     TECHNIQUE: Single AP portable radiograph of the chest     COMPARISON: Chest radiograph 11/8/2023     IMPRESSION:  FINDINGS AND IMPRESSION:  Previously seen right pneumothorax has decreased in size resulting in  approximately 1.6 cm in pleural-parenchymal separation (previously 2.8  cm). There is moderate pulmonary opacification throughout bilateral  lungs, right greater than left, suggestive of multifocal pneumonia  and/or pulmonary edema in the appropriate clinical context and  correlation with patient history is recommended with follow-up chest CT  if clinically indicated. Given the somewhat masslike opacification  overlying the right lung, at least continued attention on follow-up  chest radiograph in 4 to 6 weeks is recommended to ensure appropriate  evolution/resolution and exclude any possibility of neoplasm.            ASSESSMENT:  Acute kidney injury, prerenal, rapidly improving and near baseline  Chronic diastolic CHF - EF 55%   Nausea vomiting and dehydration, better  Left lingular lung mass  Myeloproliferative disorder  Chronic leukocytosis  Atrial fibrillation  Diarrhea, slowly resolving  Acute on chronic anemia  Diabetes mellitus  UTI on ertapenem,  ESBL E. coli  Lung mass, biopsy 11/8  Postop pneumothorax, had improved by the time chest tube was to be placed      DISCUSSION/PLAN:   Renal function continues to stabilize/improved.  Creatinine 0.72 today  Volume status acceptable, euvolemic on exam  Oxygen requirements near baseline  Continue to hold diuretics as oral intake remains low and adequate oxygenation  And is to restart oral diuretics closer to discharge     Continue to monitor electrolytes and volume closely  Discussed with family at bedside  Follow-up a.m. labs      Darrell Hanley MD  [facility]   Office phone number: [phone]  Answering service phone number: [phone]    11/11/23  11:54 EST    Dictation performed using Dragon dictation software

## 2023-11-12 LAB
ALBUMIN SERPL-MCNC: 4.1 G/DL (ref 3.5–5.2)
ANION GAP SERPL CALCULATED.3IONS-SCNC: 10 MMOL/L (ref 5–15)
BASOPHILS # BLD MANUAL: 0.6 10*3/MM3 (ref 0–0.2)
BASOPHILS NFR BLD MANUAL: 1 % (ref 0–1.5)
BUN SERPL-MCNC: 13 MG/DL (ref 8–23)
BUN/CREAT SERPL: 16 (ref 7–25)
CALCIUM SPEC-SCNC: 9.5 MG/DL (ref 8.6–10.5)
CHLORIDE SERPL-SCNC: 102 MMOL/L (ref 98–107)
CO2 SERPL-SCNC: 28 MMOL/L (ref 22–29)
CREAT SERPL-MCNC: 0.81 MG/DL (ref 0.57–1)
DEPRECATED RDW RBC AUTO: 52.7 FL (ref 37–54)
EGFRCR SERPLBLD CKD-EPI 2021: 71.2 ML/MIN/1.73
ERYTHROCYTE [DISTWIDTH] IN BLOOD BY AUTOMATED COUNT: 16.1 % (ref 12.3–15.4)
GLUCOSE SERPL-MCNC: 104 MG/DL (ref 65–99)
HCT VFR BLD AUTO: 25.8 % (ref 34–46.6)
HGB BLD-MCNC: 8.3 G/DL (ref 12–15.9)
LYMPHOCYTES # BLD MANUAL: 2.4 10*3/MM3 (ref 0.7–3.1)
LYMPHOCYTES NFR BLD MANUAL: 7 % (ref 5–12)
MCH RBC QN AUTO: 29.2 PG (ref 26.6–33)
MCHC RBC AUTO-ENTMCNC: 32.2 G/DL (ref 31.5–35.7)
MCV RBC AUTO: 90.8 FL (ref 79–97)
METAMYELOCYTES NFR BLD MANUAL: 1 % (ref 0–0)
MONOCYTES # BLD: 4.19 10*3/MM3 (ref 0.1–0.9)
MYELOCYTES NFR BLD MANUAL: 1 % (ref 0–0)
NEUTROPHILS # BLD AUTO: 51.52 10*3/MM3 (ref 1.7–7)
NEUTROPHILS NFR BLD MANUAL: 86 % (ref 42.7–76)
PHOSPHATE SERPL-MCNC: 2.9 MG/DL (ref 2.5–4.5)
PLAT MORPH BLD: NORMAL
PLATELET # BLD AUTO: 224 10*3/MM3 (ref 140–450)
PMV BLD AUTO: 11.8 FL (ref 6–12)
POTASSIUM SERPL-SCNC: 3.8 MMOL/L (ref 3.5–5.2)
RBC # BLD AUTO: 2.84 10*6/MM3 (ref 3.77–5.28)
RBC MORPH BLD: NORMAL
SODIUM SERPL-SCNC: 140 MMOL/L (ref 136–145)
VARIANT LYMPHS NFR BLD MANUAL: 4 % (ref 19.6–45.3)
WBC MORPH BLD: NORMAL
WBC NRBC COR # BLD: 59.91 10*3/MM3 (ref 3.4–10.8)

## 2023-11-12 PROCEDURE — 25010000002 METHYLPREDNISOLONE PER 40 MG: Performed by: INTERNAL MEDICINE

## 2023-11-12 PROCEDURE — 85025 COMPLETE CBC W/AUTO DIFF WBC: CPT | Performed by: INTERNAL MEDICINE

## 2023-11-12 PROCEDURE — 94799 UNLISTED PULMONARY SVC/PX: CPT

## 2023-11-12 PROCEDURE — 99221 1ST HOSP IP/OBS SF/LOW 40: CPT | Performed by: INTERNAL MEDICINE

## 2023-11-12 PROCEDURE — 80069 RENAL FUNCTION PANEL: CPT | Performed by: INTERNAL MEDICINE

## 2023-11-12 PROCEDURE — 94664 DEMO&/EVAL PT USE INHALER: CPT

## 2023-11-12 PROCEDURE — 85007 BL SMEAR W/DIFF WBC COUNT: CPT | Performed by: INTERNAL MEDICINE

## 2023-11-12 PROCEDURE — 94760 N-INVAS EAR/PLS OXIMETRY 1: CPT

## 2023-11-12 PROCEDURE — 94761 N-INVAS EAR/PLS OXIMETRY MLT: CPT

## 2023-11-12 RX ADMIN — METOPROLOL TARTRATE 2.5 MG: 1 INJECTION, SOLUTION INTRAVENOUS at 18:40

## 2023-11-12 RX ADMIN — DOCUSATE SODIUM 50MG AND SENNOSIDES 8.6MG 2 TABLET: 8.6; 5 TABLET, FILM COATED ORAL at 08:33

## 2023-11-12 RX ADMIN — Medication 250 MG: at 21:53

## 2023-11-12 RX ADMIN — SERTRALINE 100 MG: 100 TABLET, FILM COATED ORAL at 08:33

## 2023-11-12 RX ADMIN — METHYLPREDNISOLONE SODIUM SUCCINATE 40 MG: 40 INJECTION, POWDER, LYOPHILIZED, FOR SOLUTION INTRAMUSCULAR; INTRAVENOUS at 16:37

## 2023-11-12 RX ADMIN — IPRATROPIUM BROMIDE AND ALBUTEROL SULFATE 3 ML: 2.5; .5 SOLUTION RESPIRATORY (INHALATION) at 15:36

## 2023-11-12 RX ADMIN — IPRATROPIUM BROMIDE AND ALBUTEROL SULFATE 3 ML: 2.5; .5 SOLUTION RESPIRATORY (INHALATION) at 06:40

## 2023-11-12 RX ADMIN — Medication 250 MG: at 08:33

## 2023-11-12 RX ADMIN — METHYLPREDNISOLONE SODIUM SUCCINATE 40 MG: 40 INJECTION, POWDER, LYOPHILIZED, FOR SOLUTION INTRAMUSCULAR; INTRAVENOUS at 05:54

## 2023-11-12 RX ADMIN — LATANOPROST 1 DROP: 50 SOLUTION/ DROPS OPHTHALMIC at 21:53

## 2023-11-12 RX ADMIN — METOPROLOL SUCCINATE 25 MG: 25 TABLET, EXTENDED RELEASE ORAL at 08:33

## 2023-11-12 RX ADMIN — APIXABAN 5 MG: 5 TABLET, FILM COATED ORAL at 21:53

## 2023-11-12 RX ADMIN — APIXABAN 5 MG: 5 TABLET, FILM COATED ORAL at 08:33

## 2023-11-12 RX ADMIN — FAMOTIDINE 20 MG: 20 TABLET ORAL at 08:33

## 2023-11-12 RX ADMIN — Medication 3 MG: at 21:53

## 2023-11-12 NOTE — PROGRESS NOTES
Name: Catherine Boyer ADMIT: 2023   : 1940  PCP: Kristi Moreau APRN    MRN: [id number] LOS: 10 days   AGE/SEX: 83 y.o. female  ROOM: [facility]     Subjective   Subjective   Patient seen and examined this morning.  Hospital day 10.  At time of my examination, patient is awake, alert and resting in bed, no acute distress or complaints at present.        Objective   Objective   Vital Signs  Temp:  [97.4 °F (36.3 °C)-98.4 °F (36.9 °C)] 98.4 °F (36.9 °C)  Heart Rate:  [] 106  Resp:  [16-24] 24  BP: (112-147)/(59-81) 118/75  SpO2:  [92 %-99 %] 95 %  on  Flow (L/min):  [3-4] 3;   Device (Oxygen Therapy): nasal cannula  Body mass index is 25.41 kg/m².  Physical Exam  Vitals and nursing note reviewed.   Constitutional:       General: She is not in acute distress.     Comments: Elderly, chronically ill-appearing   Cardiovascular:      Rate and Rhythm: Tachycardia present. Rhythm irregular.      Pulses: Normal pulses.      Heart sounds: Normal heart sounds.   Pulmonary:      Effort: Pulmonary effort is normal. No respiratory distress.      Breath sounds: Normal breath sounds. No wheezing.   Abdominal:      General: Bowel sounds are normal.      Palpations: Abdomen is soft.      Tenderness: There is no abdominal tenderness.   Skin:     General: Skin is warm and dry.      Coloration: Skin is pale.   Neurological:      Mental Status: She is alert.       Results Review     I reviewed the patient's new clinical results.  Results from last 7 days   Lab Units 23  0728 23  0702 11/10/23  1300 23  0008 23  1825 23  0559   WBC 10*3/mm3 59.91* 64.69* 58.19*  --   --  46.36*   HEMOGLOBIN g/dL 8.3* 8.2* 7.9* 7.8*   < > 9.6*   PLATELETS 10*3/mm3 224 202 188  --   --  240    < > = values in this interval not displayed.     Results from last 7 days   Lab Units 23  0728 23  0702 11/10/23  0607 23  0214   SODIUM mmol/L 140 141 142 138   POTASSIUM mmol/L 3.8 3.8 3.9 4.3  "  CHLORIDE mmol/L 102 105 108* 107   CO2 mmol/L 28.0 25.0 24.3 22.0   BUN mg/dL 13 14 17 17   CREATININE mg/dL 0.81 0.72 0.82 1.07*   GLUCOSE mg/dL 104* 103* 116* 172*   EGFR mL/min/1.73 71.2 82.1 70.2 51.0*     Results from last 7 days   Lab Units 11/12/23  0728 11/11/23  0702 11/10/23  0607 11/09/23  0214   ALBUMIN g/dL 4.1 3.8 3.4* 3.4*     Results from last 7 days   Lab Units 11/12/23  0728 11/11/23  0702 11/10/23  0607 11/09/23 0214 11/09/23  0008   CALCIUM mg/dL 9.5 9.3 8.9 8.3*  --    ALBUMIN g/dL 4.1 3.8 3.4* 3.4*  --    MAGNESIUM mg/dL  --  1.6  --   --  1.6   PHOSPHORUS mg/dL 2.9 2.6 3.4 4.0  --            No results found for: \"HGBA1C\", \"POCGLU\"    No radiology results for the last day    I have personally reviewed all medications:  Scheduled Medications  apixaban, 5 mg, Oral, Q12H  famotidine, 20 mg, Oral, Daily  ipratropium-albuterol, 3 mL, Nebulization, Q8H - RT  latanoprost, 1 drop, Both Eyes, Nightly  lidocaine, 20 mL, Infiltration, Once  methylPREDNISolone sodium succinate, 40 mg, Intravenous, Q12H  metoprolol succinate XL, 25 mg, Oral, Daily  Momelotinib Dihydrochloride, 200 mg, Oral, Daily  saccharomyces boulardii, 250 mg, Oral, BID  senna-docusate sodium, 2 tablet, Oral, BID  sertraline, 100 mg, Oral, Daily    Infusions   Diet  Diet: Cardiac Diets, Diabetic Diets; Healthy Heart (2-3 Na+); Consistent Carbohydrate; Texture: Regular Texture (IDDSI 7); Fluid Consistency: Thin (IDDSI 0)    I have personally reviewed:  [x]  Laboratory   [x]  Microbiology   [x]  Radiology   [x]  EKG/Telemetry  [x]  Cardiology/Vascular   []  Pathology    []  Records       Assessment/Plan     Active Hospital Problems    Diagnosis  POA    **Acute kidney injury [N17.9]  Yes    Iatrogenic pneumothorax [J95.811]  No    Pulmonary infiltrate [R91.8]  Unknown    Myelofibrosis [D75.81]  Yes    Personal history of transient ischemic attack (TIA), 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 deficits [Z86.73]  Not Applicable    " Hypertension [I10]  Yes    Type 2 diabetes mellitus with circulatory disorder, 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       83 y.o. female admitted with Acute kidney injury.    Acute Cystitis without Hematuria  - Urine culture grew ESBL.  - Completed ertapenem     KARLI  - Likely from volume depletion from reduced oral intake and diarrhea in the setting of diuretic treatment, resolved with IVF, these are now stopped. Diuretics still being held at the moment, possibly restart soon based on clinical course.  - Order repeat BMP in AM for reassessment.  - Appreciate nephrology recs     Lung mass  - Indeterminate, lingular on imaging. s/p bronchoscopy 11/8/23 with biopsies, pathology showing no evidence of malignancy but only 3 biopsies (as opposed to 6-10) were obtained due to bleeding  - has small iatrogenic pneumothorax, improved with conservative treatment  - follow up chest CT scan without contrast in 6-8 weeks  - appreciate pulmonology recs, they have signed off-follow up with [facility] for Dr. Billings, in 8 weeks     Aortic Stenosis/Chronic Diastolic CHF  - At present, patient appears stable from this perspective.  She does not have any evidence to suggest acute heart failure exacerbation.  No acute intervention warranted, however, do need to closely monitor.  - Strict I/O, daily weights, telemetry, pulse oximetry, low sodium diet.  - Closely monitor volume status.    Coronary artery disease  Paroxysmal atrial fibrillation  Hypertension  - Eliquis and Plavix held for bronchoscopy. Eliquis has since been resumed, but Plavix still held. Notified by nursing this afternoon, HR intermittently more elevated despite current Metoprolol dose. Blood pressure slightly soft but stable, gave x1 dose of  IV Metoprolol 2.5 mg, which has led to improvement for now. Planning to continue monitoring. If HR remains elevated, will require adjustment to Metoprolol oral dose if blood pressure can tolerate.     Myeloproliferative disorder/Anemia  Functional iron deficiency  Leukocytosis  - Has chronic leukocytosis, values elevated on most recent labs, however, relatively stable from prior.  - s/p 1 unit of PRBCs 11/6/23 and 11/7/23 with good response. Iron studies showing mixed picture, but does meet criteria for functional iron deficiency with history of CHF and ferritin <500 and TSAT <20. B12 elevated, folate normal.   - Hemoglobin low on most recent labs, however, stable from prior. She received x1 dose of Ferrlecit on 11/11 with good response.  - Discussed with family, she follows with Dr. Reinoso as outpatient, they would like him to see her while she is here.  - Consult Hematology now. Appreciate their help.  - Order repeat CBC in AM for reassessment. Continue to monitor, transfuse for hemoglobin <7.    SCDs for DVT prophylaxis.  Full code.  Discussed with patient, family, and nursing staff.  Anticipate discharge to SNU facility once arrangements have been made.      Reuben Pierce DO  [facility]  11/12/23

## 2023-11-12 NOTE — PROGRESS NOTES
LOS: 10 days     Chief Complaint/ Reason for encounter: KARLI    Subjective   11/06/23 : Patient resting with family at bedside able provide some history  She is doing well overall no new complaints  Weight up today but she is not edematous, not short of breath  She is on 3 L oxygen which is near her home rate  Eating some, drinking well according to family    11/7: No new complaints, bronchoscopy with biopsy scheduled for today  Oral intake remains very low, no shortness of breath chest pain or edema    11/8: Events noted, patient developed a right pneumothorax after her bronchoscopy today.  Higher than normal bleeding during the procedure noted, CT-guided chest tube fortunately not needed  She has been intermittently hypotensive, currently 89/48, O2 requirements about the same, 3 L    11/9: She doing a little better today, fortunately did not need a chest tube  Oxygen requirements near baseline of around 3 L    11/10 family room, says her appetite is low and that she is tired, bp near goal     11/11: Resting, no new complaints or events, no shortness of breath or chest pain overnight  Good appetite    11/12: She looks and feels well denies complaints, still with very poor appetite no dyspnea    Medical history reviewed:  History of Present Illness    Subjective    History taken from: Patient and chart    Vital Signs  Temp:  [97.4 °F (36.3 °C)-98.4 °F (36.9 °C)] 98.4 °F (36.9 °C)  Heart Rate:  [] 106  Resp:  [16-24] 24  BP: (112-147)/(59-81) 118/75       Wt Readings from Last 1 Encounters:   11/12/23 0509 66.7 kg (147 lb)   11/11/23 0545 67.1 kg (147 lb 14.9 oz)   11/10/23 0600 67.8 kg (149 lb 7.6 oz)   11/09/23 0600 67.7 kg (149 lb 4 oz)   11/08/23 0428 67.7 kg (149 lb 4 oz)   11/07/23 0600 67.6 kg (149 lb 0.5 oz)   11/06/23 0605 67.6 kg (149 lb 0.5 oz)   11/05/23 0507 62.6 kg (138 lb)   11/04/23 0539 62.6 kg (138 lb)   11/03/23 0556 63 kg (138 lb 14.2 oz)       Objective:  Vital signs: (most recent): Blood  "pressure 118/75, pulse 106, temperature 98.4 °F (36.9 °C), temperature source Oral, resp. rate 24, height 162 cm (63.78\"), weight 66.7 kg (147 lb), SpO2 95%.                Objective:  General Appearance:  Comfortable, well-appearing, in no acute distress and not in pain.  Awake, alert, oriented  HEENT: Mucous membranes moist, no injury, oropharynx clear  Lungs:  Normal effort and normal respiratory rate.  Breath sounds clear to auscultation.  No  respiratory distress.  No rales, decreased breath sounds or rhonchi.    Heart: Normal rate.  Regular rhythm.  S1, S2 normal.  No murmur.   Abdomen: Abdomen is soft.  Bowel sounds are normal, no abdominal tenderness.  There is no rebound or guarding  Extremities: no edema of bilateral lower extremities  Neurological: No focal motor or sensory deficits, pupils reactive  Skin:  Warm and dry.  No rash or cyanosis.       Results Review:    Intake/Output:     Intake/Output Summary (Last 24 hours) at 11/12/2023 1151  Last data filed at 11/12/2023 1048  Gross per 24 hour   Intake 360 ml   Output 1400 ml   Net -1040 ml         DATA:  Radiology and Labs:  The following labs independently reviewed by me. Additional labs ordered for tomorrow a.m.  Interval notes, chart personally reviewed by me.   Old records independently reviewed showing normal baseline creatinine, history of CHF  Discussed with patient's family at bedside    Risk/ complexity of medical care/ medical decision making moderate risk, KARLI, fluid and electrolyte management    Labs:   Recent Results (from the past 24 hour(s))   Renal Function Panel    Collection Time: 11/12/23  7:28 AM    Specimen: Blood   Result Value Ref Range    Glucose 104 (H) 65 - 99 mg/dL    BUN 13 8 - 23 mg/dL    Creatinine 0.81 0.57 - 1.00 mg/dL    Sodium 140 136 - 145 mmol/L    Potassium 3.8 3.5 - 5.2 mmol/L    Chloride 102 98 - 107 mmol/L    CO2 28.0 22.0 - 29.0 mmol/L    Calcium 9.5 8.6 - 10.5 mg/dL    Albumin 4.1 3.5 - 5.2 g/dL    Phosphorus " 2.9 2.5 - 4.5 mg/dL    Anion Gap 10.0 5.0 - 15.0 mmol/L    BUN/Creatinine Ratio 16.0 7.0 - 25.0    eGFR 71.2 >60.0 mL/min/1.73   CBC Auto Differential    Collection Time: 11/12/23  7:28 AM    Specimen: Blood   Result Value Ref Range    WBC 59.91 (C) 3.40 - 10.80 10*3/mm3    RBC 2.84 (L) 3.77 - 5.28 10*6/mm3    Hemoglobin 8.3 (L) 12.0 - 15.9 g/dL    Hematocrit 25.8 (L) 34.0 - 46.6 %    MCV 90.8 79.0 - 97.0 fL    MCH 29.2 26.6 - 33.0 pg    MCHC 32.2 31.5 - 35.7 g/dL    RDW 16.1 (H) 12.3 - 15.4 %    RDW-SD 52.7 37.0 - 54.0 fl    MPV 11.8 6.0 - 12.0 fL    Platelets 224 140 - 450 10*3/mm3   Manual Differential    Collection Time: 11/12/23  7:28 AM    Specimen: Blood   Result Value Ref Range    Neutrophil % 86.0 (H) 42.7 - 76.0 %    Lymphocyte % 4.0 (L) 19.6 - 45.3 %    Monocyte % 7.0 5.0 - 12.0 %    Basophil % 1.0 0.0 - 1.5 %    Metamyelocyte % 1.0 (H) 0.0 - 0.0 %    Myelocyte % 1.0 (H) 0.0 - 0.0 %    Neutrophils Absolute 51.52 (H) 1.70 - 7.00 10*3/mm3    Lymphocytes Absolute 2.40 0.70 - 3.10 10*3/mm3    Monocytes Absolute 4.19 (H) 0.10 - 0.90 10*3/mm3    Basophils Absolute 0.60 (H) 0.00 - 0.20 10*3/mm3    RBC Morphology Normal Normal    WBC Morphology Normal Normal    Platelet Morphology Normal Normal       Radiology:  Pertinent radiology studies were reviewed as described above      Medications have been reviewed separately in chart overview    Narrative & Impression   Portable chest radiograph     HISTORY: Pneumothorax     TECHNIQUE: Single AP portable radiograph of the chest     COMPARISON: Chest radiograph 11/8/2023     IMPRESSION:  FINDINGS AND IMPRESSION:  Previously seen right pneumothorax has decreased in size resulting in  approximately 1.6 cm in pleural-parenchymal separation (previously 2.8  cm). There is moderate pulmonary opacification throughout bilateral  lungs, right greater than left, suggestive of multifocal pneumonia  and/or pulmonary edema in the appropriate clinical context and  correlation with  patient history is recommended with follow-up chest CT  if clinically indicated. Given the somewhat masslike opacification  overlying the right lung, at least continued attention on follow-up  chest radiograph in 4 to 6 weeks is recommended to ensure appropriate  evolution/resolution and exclude any possibility of neoplasm.            ASSESSMENT:  Acute kidney injury, prerenal, rapidly improving and near baseline  Chronic diastolic CHF - EF 55%   Nausea vomiting and dehydration, better  Left lingular lung mass  Myeloproliferative disorder  Chronic leukocytosis  Atrial fibrillation  Diarrhea, slowly resolving  Acute on chronic anemia  Diabetes mellitus  UTI on ertapenem, ESBL E. coli  Lung mass, biopsy 11/8  Postop pneumothorax, had improved by the time chest tube was to be placed      DISCUSSION/PLAN:   Renal function, volume and electrolytes all at goal today  I am hesitant to restart her oral diuretics since her oral intake remains very low.  She has no edema and O2 requirements at baseline    Volume status acceptable, euvolemic on exam  restart oral diuretics closer to discharge when appetite improves  Curly with family at bedside     Continue to monitor electrolytes and volume closely  Follow-up a.m. labs      Darrell Hanley MD  [facility]   Office phone number: [phone]  Answering service phone number: [phone]    11/12/23  11:51 EST    Dictation performed using Dragon dictation software

## 2023-11-12 NOTE — CONSULTS
REASON FOR CONSULTATION: Myelofibrosis  Provide an opinion on any further workup or treatment                             HISTORY OF PRESENT ILLNESS:  The patient is a 83 y.o. year old female who is here for an opinion about the above issue.    The patient is an 83-year-old female who was admitted 11/2/2023 with KARLI.  She is now on hospital day #9 with records indicate that she had been found to have acute cystitis with urine growing ESBL and completed a ertapenem.  Additionally KARLI was thought to be from volume depletion and was reviewed by renal medicine with some degree of improvement.  Additional history includes an irregular, indeterminate lung mass status post biopsies that were nondiagnostic and follow-up scans are scheduled in the next 6 weeks.  She, additionally, has aortic stenosis and coronary disease and is felt to be stable.    We are asked to follow-up on a history of a myeloproliferative disorder that, and record review, is consistent with myelofibrosis.    She was last seen in office 10/18/2023 completing Jakafi and with plans to 10/19/2023 Raymond momelotinib.  A review of her CBCs since admission includes variable anemia, acceptable platelet counts, leukocytosis also variable but initially dropping and then gradually increasing to current levels.    Interval history:  T98.1, pulse 106, respiration 24, /75  Patient seen with her son and we discussed her treatment for myelofibrosis since diagnosis.  She had, incidentally, restarted momelotinib in the hospital 11/3/2023 discussion with pharmacy.  She is asked to continue.  H&H 8.3 and 25.8, white count 59,910, platelet count of 224,000        Past Medical History:   Diagnosis Date    Atherosclerosis of abdominal aorta 02/05/2023    Atrial fibrillation     Breast cancer     CAD (coronary artery disease)     NSTEMI 2/2022: 90% ostial LAD, 99% D1, 70% mid-distal LAD (medical therapy). She received two stents (2.5x18 and 2.5x26mm Finesse LEFTY)  but I don't know which one went to which lesion.    Carotid atherosclerosis     Cholecystitis 11/22/2022    Added automatically from request for surgery [id number]    Chronic diastolic (congestive) heart failure     COVID 10/29/2022    GERD (gastroesophageal reflux disease)     Glaucoma     History of cataract     Hypertension     Microcytic anemia     per Dr. oleg pate office  note 6/30/22-dd    Myeloproliferative disorder     JAK2 positive    Nonbacterial thrombotic endocarditis     6/2021: 4x5mm vegetation on the ventricular surface of the anterior MV, negative blood cultures    Porcelain gallbladder     PUD (peptic ulcer disease)     TIA (transient ischemic attack)     Type 2 diabetes mellitus     Type 2 diabetes mellitus with circulatory disorder, without long-term current use of insulin 07/14/2022    Upper GI bleed         Past Surgical History:   Procedure Laterality Date    BREAST LUMPECTOMY  1999    BRONCHOSCOPY Bilateral 11/8/2023    Procedure: BRONCHOSCOPY UNDER FLUORO WITH BAL,  BIOPSIES; ACETYLCYSTEIN 4ML GIVEN;  Surgeon: Raoul Alford MD;  Location: [facility] ENDOSCOPY;  Service: Pulmonary;  Laterality: Bilateral;  PRE- PULMONARY INFILTRATES  POST- SAME    CARDIAC CATHETERIZATION N/A 09/02/2022    Procedure: Coronary angiography;  Surgeon: Guero Verde MD;  Location: [facility] CATH INVASIVE LOCATION;  Service: Cardiovascular;  Laterality: N/A;    CARDIAC CATHETERIZATION N/A 09/02/2022    Procedure: Stent LEFTY coronary;  Surgeon: Guero Verde MD;  Location: [facility] CATH INVASIVE LOCATION;  Service: Cardiovascular;  Laterality: N/A;    CATARACT EXTRACTION  2011    CHOLECYSTECTOMY      CHOLECYSTECTOMY WITH INTRAOPERATIVE CHOLANGIOGRAM N/A 11/28/2022    Procedure: CHOLECYSTECTOMY LAPAROSCOPIC INTRAOPERATIVE CHOLANGIOGRAM;  Surgeon: Dani Grover Jr., MD;  Location: [facility] MAIN OR;  Service: General;  Laterality: N/A;    COLONOSCOPY N/A 02/09/2023    Procedure: COLONOSCOPY TO CECUM & T.I.;  Surgeon:  Guero Ferris MD;  Location: [facility] ENDOSCOPY;  Service: Gastroenterology;  Laterality: N/A;  PRE- MELENA, GI BLEED  POST- DIVERTICULOSIS, INT HEMORRHOIDS    ENDOSCOPY N/A 01/25/2023    Procedure: ESOPHAGOGASTRODUODENOSCOPY WITH BIOPSIES;  Surgeon: Charles Diaz MD;  Location: [facility] ENDOSCOPY;  Service: Gastroenterology;  Laterality: N/A;  pre: abd pain, nausea  post: hiatal hernia, mild gastritis, sloughing of the esophagus    ENTEROSCOPY SMALL BOWEL N/A 02/09/2023    Procedure: ENTEROSCOPY SMALL BOWEL;  Surgeon: Guero Ferris MD;  Location: [facility] ENDOSCOPY;  Service: Gastroenterology;  Laterality: N/A;  PRE- MELENA, GI BLEED  POST- HIATAL HERNIA    JOINT REPLACEMENT      UPPER GASTROINTESTINAL ENDOSCOPY          No current facility-administered medications on file prior to encounter.     Current Outpatient Medications on File Prior to Encounter   Medication Sig Dispense Refill    acetaminophen (TYLENOL) 500 MG tablet Take 1 tablet by mouth As Needed.      apixaban (ELIQUIS) 5 MG tablet tablet Take 1 tablet by mouth 2 (Two) Times a Day. 180 tablet 3    clopidogrel (PLAVIX) 75 MG tablet TAKE 1 TABLET DAILY 30 tablet 11    famotidine (PEPCID) 20 MG tablet Take 1 tablet by mouth Daily.      furosemide (LASIX) 40 MG tablet TAKE 1 TABLET DAILY 90 tablet 3    hydrocortisone-bacitracin-zinc oxide-nystatin (MAGIC BARRIER) Apply 1 application topically to the appropriate area as directed 2 (Two) Times a Day.      latanoprost (XALATAN) 0.005 % ophthalmic solution Administer 1 drop to both eyes.      metoprolol succinate XL (TOPROL-XL) 25 MG 24 hr tablet Take 1 tablet by mouth Daily. 90 tablet 2    Momelotinib Dihydrochloride (OJJAARA) 200 MG tablet Take 1 tablet by mouth Daily. 28 tablet 3    ondansetron (Zofran) 4 MG tablet Take 1 tablet by mouth Every 8 (Eight) Hours As Needed for Nausea or Vomiting. 90 tablet 0    saccharomyces boulardii (FLORASTOR) 250 MG capsule Take 1 capsule by mouth 2 (Two) Times a Day. 60  "capsule 0    sertraline (ZOLOFT) 100 MG tablet Take 1 tablet by mouth Daily.      simethicone (MYLICON) 80 MG chewable tablet Chew 1 tablet by mouth 4 (Four) Times a Day As Needed for Flatulence. 100 tablet 0    sucralfate (CARAFATE) 1 g tablet Take 1 tablet by mouth Every 12 (Twelve) Hours.      loperamide (IMODIUM) 2 MG capsule Take 1 capsule by mouth 4 (Four) Times a Day As Needed for Diarrhea. 120 capsule 0    mupirocin (BACTROBAN) 2 % ointment APPLY TOPICALLY TWICE DAILY TO WOUND          ALLERGIES:    Allergies   Allergen Reactions    Aspirin Unknown - Low Severity    Diphenhydramine Hcl Anxiety     Benadryl IVP    Hydroxyurea Other (See Comments)     Her hemoglobin drop and was stop in February 2022 and start taking Jakafi    Oxycodone Unknown - Low Severity        Social History     Socioeconomic History    Marital status:    Tobacco Use    Smoking status: Former     Packs/day: 1.00     Years: 20.00     Additional pack years: 0.00     Total pack years: 20.00     Types: Cigarettes     Passive exposure: Past    Smokeless tobacco: Never    Tobacco comments:     30  years ago   Vaping Use    Vaping Use: Never used   Substance and Sexual Activity    Alcohol use: Yes     Comment: \"rare\"    Drug use: Never    Sexual activity: Defer        Family History   Problem Relation Age of Onset    Ovarian cancer Mother     Lung cancer Father     Lung cancer Sister     Malig Hyperthermia Neg Hx         Review of Systems   See history of present illness  Objective     Vitals:    11/12/23 0640 11/12/23 0650 11/12/23 0654 11/12/23 0713   BP:    118/75   BP Location:    Right arm   Patient Position:    Lying   Pulse: (!) 124 107 101 106   Resp: 24   24   Temp:    98.4 °F (36.9 °C)   TempSrc:    Oral   SpO2: 95% 99% 94% 95%   Weight:       Height:             10/18/2023     4:02 PM   Current Status   ECOG score 2       Physical Exam  Constitutional:       Appearance: Normal appearance. She is normal weight.   HENT:      " Head: Normocephalic and atraumatic.      Nose: Nose normal.      Mouth/Throat:      Mouth: Mucous membranes are moist.      Pharynx: Oropharynx is clear.   Eyes:      Extraocular Movements: Extraocular movements intact.      Conjunctiva/sclera: Conjunctivae normal.      Pupils: Pupils are equal, round, and reactive to light.   Cardiovascular:      Rate and Rhythm: Normal rate and regular rhythm.      Pulses: Normal pulses.      Heart sounds: Normal heart sounds.   Pulmonary:      Effort: Pulmonary effort is normal.      Breath sounds: Normal breath sounds.   Abdominal:      General: Bowel sounds are normal.      Palpations: Abdomen is soft. There is mass (No overt hepatosplenomegaly).   Musculoskeletal:         General: Normal range of motion.      Cervical back: Normal range of motion and neck supple.   Skin:     General: Skin is warm and dry.   Neurological:      General: No focal deficit present.      Mental Status: She is oriented to person, place, and time.   Psychiatric:         Mood and Affect: Mood normal.           RECENT LABS:  Hematology WBC   Date Value Ref Range Status   11/12/2023 59.91 (C) 3.40 - 10.80 10*3/mm3 Final     RBC   Date Value Ref Range Status   11/12/2023 2.84 (L) 3.77 - 5.28 10*6/mm3 Final     Hemoglobin   Date Value Ref Range Status   11/12/2023 8.3 (L) 12.0 - 15.9 g/dL Final     Hematocrit   Date Value Ref Range Status   11/12/2023 25.8 (L) 34.0 - 46.6 % Final     Platelets   Date Value Ref Range Status   11/12/2023 224 140 - 450 10*3/mm3 Final          Assessment & Plan     *JAK2 positive myeloproliferative disorder, perhaps primary myelofibrosis  She has been seen by Dr. Gerard Foreman with [facility] specialists and cancer and blood disorders in [place].      She was seen there initially on 6/4/2021 with leukocytosis with a white blood cell count of 104,000.  Hemoglobin was normal at 11.6 and platelets were normal at 299,000.  PCR for BCR/ABL and FISH for BCR/ABL were negative.  A bone  marrow aspiration was attempted on 6/9/2021 which was unsuccessful.  She then went to [place] for the summer.  She was admitted to the [facility] between 6/29/2021 and 7/9/2021.  She had a bone marrow aspiration and biopsy performed there on 6/30/2021 showing a hypercellular marrow at greater than 95% with 1% blasts.  Focal 1+ reticulin fibrosis was noted.  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  PCR for BCR/ABL was negative.  JAK2 V617F PCR was positive.  Cytogenetics were normal.  Next generation myeloid disorder profiling of the bone marrow aspirate from 6/30/2021 showed TET2 [id number] and [id number]*47 abnormalities and JAK2 V617F.  Therefore she was suspected to have early primary myelofibrosis and she was started on hydroxyurea 1000 mg daily.  Subsequently, hydroxyurea was held.  Blood counts had improved.  She was admitted to the hospital from 2/9 through 2/15/2022 for symptomatic anemia and chest pain.  Her hemoglobin was 5.5.  The white blood cell count was 7.6.  Platelets were 56,000.  Fecal occult blood testing was negative.  She received 3 units of packed red blood cells.  No endoscopy was performed.  A left heart catheterization was performed with a drug-eluting stent placed to the second diagonal on 2/11/2022 and she was discharged from that hospitalization on aspirin 81 mg, Plavix 75 mg, and Eliquis 5 mg twice daily.  Blood counts improved by 3/7/2022 and her white blood cell count was 7.2 with a hemoglobin 11.4 and platelets 295,000.  She did have a bone marrow aspiration and biopsy on 3/7/2022 showing chronic myeloproliferative neoplasm with potentially post ET fibrosis or consideration of primary myelofibrosis.  There was no evidence for myelodysplasia.  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  She also has atrial fibrillation and is anticoagulated with Eliquis.    Hydroxyurea was  discontinued and she was started on ruxolitinib 15 mg twice daily at her visit on 3/14/2022 with Dr. Gerard Foreman.  She had CT imaging of the chest abdomen pelvis on 3/19/2022 that did not demonstrate any splenomegaly.  Calcified mediastinal lymphadenopathy was noted.  Borderline pretracheal and right hilar lymphadenopathy noted.  Small bilateral pleural effusions with bibasilar atelectasis noted.  She has moved from [place] to [place] and is seen initially in the office on 5/15/2022.  She tolerates ruxolitinib well.  White blood cell count has decreased from 5.9 from 19.6.  Platelets have normalized at 170,000, down from 464,000.  The hemoglobin has also decreased at 8.6 with an MCV of 98.9.  5/26/2022: White blood cell count mildly elevated at 12.4 with a normal platelet count at 216,000.  She tolerates Jakafi very well.   6/30/2022: White blood cell count a little higher.  Continue monitoring.  9/13/2022: White blood cell count higher at 32,000  Flow cytometry 9/13/2022 with 0.1% myeloblasts with a typical (normal) phenotype.  Nonspecific monocyte population.  Bone marrow aspiration and biopsy on 10/7/2022 with hypercellular marrow at 95% with involvement by chronic myeloproliferative neoplasm, less than 5% blasts.  Mild reticulin fibrosis.  Decreased iron stores.  Consideration of CMML.  Flow cytometry showed a dim CD56 positive aberrant monocyte population at 8.2 percent of events.  Cytogenetics pending.  NGS pending.  10/26/2022: White blood cell count improved at 21,000.  Patient admitted 10/29/2022 with COVID-19 infection.  Jakafi held.  Resumed Jakafi 15 mg twice daily on 11/1/2022.  WBC stable at 51,000  On 2/17/2023 a CT scan of the abdomen and pelvis showed improved splenomegaly at 14.5 cm, previously 16 cm  4/10/2023: White blood cell count reasonably stable at 66,000  6/26/2023: White blood cell count stable at 42,000  9/19/2023: White blood cell count stable at 52.98  10/18/23: she took her last  dose of Jakafi this morning and will start momelotinib (Ojjaara) 200 mg daily on 10/19  Patient seen in hospitalization after treatment for UTI and associated KARLI.  We have agreed that she would continue her momelotinib dose from her home supply having restarted during her hospitalization 11/3/2023.     *Microcytic and now normocytic anemia  Hemoglobin improved at 10.9, from 9.7  Reassessment 11/12/2023 with H&H of 8.3 and 25.8       *History of marantic endocarditis and atrial fibrillation  Anticoagulated with Eliquis 5 mg twice daily  Hold this due to post-Mohs bleeding     *Recent admission with heart failure, positive stress test and LEFTY to a large diagonal branch for in tsent stenosis.     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  She does not wish to pursue further evaluation.  She did have some brief epigastric/right upper quadrant pain which has now resolved.  Cholecystectomy by Dr. Dani Grover Jr. on 11/28/2022     *COVID-19 infection  Patient presented to ER on 10/29/2022 with progressive generalized weakness x10 days  Patient tested positive for COVID-19 on admission 10/29/2022  Elevated lactate 3.0  Chest x-ray 10/29/2022 with no evidence of consolidation  Patient was started on dexamethasone and remdesivir  Improved and recovered     *GI bleeding February 2023  Presented with abdominal pain, melena.  Hemoglobin 6.1 at presentation.  EGD on 1/25/2023 had noted mild mucosal changes characterized by slowing in the lower third of the esophagus.  Patchy mild inflammation characterized by congestion and erythema found in the gastric body.  The examined duodenum was normal.  Multiple biopsies taken.  Suspected bleeding from prior biopsy site  Anticoagulation with Eliquis and Plavix was held.  On PPI  GI follow-up- EGD/push enteroscopy and  colonoscopy when Eliquis held 5 days.    Procedures occurred on 2/9/2023.  Nonbleeding internal hemorrhoids.  No source of bleeding identified.     *Elevated transaminases  She has been seen by gastroenterology  Last AST 77 and ALT 96 with an alkaline phosphatase of 507  Anti-smooth muscle antibodies and mitochondrial antibodies normal  Transaminases remain normal     *Skin lesions  She saw Dr. Fierro at [facility] in Dermatology with biopsies of both locations showing squamous cell carcinoma  Mohs procedure performed on her leg on 8/4     PLAN:      Continue momelotinib (Ojjaara) 200 mg daily home supply  Currently undergoing supportive therapy and completing treatments for cystitis, KARLI and anticipating discharge to SNU when arrangements are completed.  She has appointment scheduled to CBC office 11/20/2023 though this may need to be rescheduled pending her SNU stay  No additional recommendations at this point.  We will sign off.  Please note if we can be of further assistance

## 2023-11-12 NOTE — PLAN OF CARE
Problem: Adult Inpatient Plan of Care  Goal: Plan of Care Review  Outcome: Ongoing, Progressing  Goal: Patient-Specific Goal (Individualized)  Outcome: Ongoing, Progressing  Goal: Absence of Hospital-Acquired Illness or Injury  Outcome: Ongoing, Progressing  Intervention: Identify and Manage Fall Risk  Recent Flowsheet Documentation  Taken 11/12/2023 1400 by Ismael Lowery RN  Safety Promotion/Fall Prevention:   safety round/check completed   room organization consistent  Taken 11/12/2023 1200 by Ismael Lowery RN  Safety Promotion/Fall Prevention:   safety round/check completed   room organization consistent  Taken 11/12/2023 1000 by Ismael Lowery RN  Safety Promotion/Fall Prevention:   safety round/check completed   room organization consistent  Taken 11/12/2023 0800 by Ismael Lowery RN  Safety Promotion/Fall Prevention:   safety round/check completed   room organization consistent  Intervention: Prevent Skin Injury  Recent Flowsheet Documentation  Taken 11/12/2023 1400 by Ismael Lowery RN  Body Position: position changed independently  Taken 11/12/2023 0800 by Ismael Lowery RN  Body Position: position changed independently  Intervention: Prevent and Manage VTE (Venous Thromboembolism) Risk  Recent Flowsheet Documentation  Taken 11/12/2023 1400 by Ismael Lowery RN  Activity Management: activity encouraged  Taken 11/12/2023 0800 by Ismael Lowery RN  Activity Management: activity encouraged  Intervention: Prevent Infection  Recent Flowsheet Documentation  Taken 11/12/2023 1400 by Ismael Lowery RN  Infection Prevention:   single patient room provided   rest/sleep promoted  Taken 11/12/2023 1200 by Ismael Lowery RN  Infection Prevention:   single patient room provided   rest/sleep promoted  Taken 11/12/2023 1000 by Ismael Lowery RN  Infection Prevention:   single patient room provided   rest/sleep promoted  Taken 11/12/2023 0800 by Ismael Lowery RN  Infection Prevention:   single patient room  provided   rest/sleep promoted  Goal: Optimal Comfort and Wellbeing  Outcome: Ongoing, Progressing  Intervention: Monitor Pain and Promote Comfort  Recent Flowsheet Documentation  Taken 11/12/2023 0800 by Ismael Lowery RN  Pain Management Interventions: position adjusted  Intervention: Provide Person-Centered Care  Recent Flowsheet Documentation  Taken 11/12/2023 1400 by Ismael Lowery RN  Trust Relationship/Rapport:   care explained   questions encouraged  Goal: Readiness for Transition of Care  Outcome: Ongoing, Progressing     Problem: Adjustment to Illness (Sepsis/Septic Shock)  Goal: Optimal Coping  Outcome: Ongoing, Progressing     Problem: Bleeding (Sepsis/Septic Shock)  Goal: Absence of Bleeding  Outcome: Ongoing, Progressing     Problem: Glycemic Control Impaired (Sepsis/Septic Shock)  Goal: Blood Glucose Level Within Desired Range  Outcome: Ongoing, Progressing  Intervention: Optimize Glycemic Control  Recent Flowsheet Documentation  Taken 11/12/2023 1400 by Ismael Lowery RN  Glycemic Management: blood glucose monitored  Taken 11/12/2023 0800 by Ismael Loewry RN  Glycemic Management: blood glucose monitored     Problem: Infection Progression (Sepsis/Septic Shock)  Goal: Absence of Infection Signs and Symptoms  Outcome: Ongoing, Progressing  Intervention: Initiate Sepsis Management  Recent Flowsheet Documentation  Taken 11/12/2023 1400 by Ismael Lowery RN  Infection Prevention:   single patient room provided   rest/sleep promoted  Isolation Precautions:   precautions maintained   contact  Taken 11/12/2023 1200 by Ismael Lowery RN  Infection Prevention:   single patient room provided   rest/sleep promoted  Isolation Precautions:   precautions maintained   contact  Taken 11/12/2023 1000 by Ismael Lowery RN  Infection Prevention:   single patient room provided   rest/sleep promoted  Isolation Precautions:   precautions maintained   contact  Taken 11/12/2023 0800 by Ismael Lowery RN  Infection  Prevention:   single patient room provided   rest/sleep promoted  Isolation Precautions:   precautions maintained   contact  Intervention: Promote Recovery  Recent Flowsheet Documentation  Taken 11/12/2023 1400 by Ismael Lowery RN  Activity Management: activity encouraged  Taken 11/12/2023 0800 by Ismael Lowery RN  Activity Management: activity encouraged     Problem: Nutrition Impaired (Sepsis/Septic Shock)  Goal: Optimal Nutrition Intake  Outcome: Ongoing, Progressing     Problem: Fall Injury Risk  Goal: Absence of Fall and Fall-Related Injury  Outcome: Ongoing, Progressing  Intervention: Identify and Manage Contributors  Recent Flowsheet Documentation  Taken 11/12/2023 1400 by Ismael Lowery RN  Medication Review/Management: medications reviewed  Taken 11/12/2023 1200 by Ismael Lowery RN  Medication Review/Management: medications reviewed  Taken 11/12/2023 1000 by Ismael Lowery RN  Medication Review/Management: medications reviewed  Taken 11/12/2023 0800 by Ismael Lowery RN  Medication Review/Management: medications reviewed  Intervention: Promote Injury-Free Environment  Recent Flowsheet Documentation  Taken 11/12/2023 1400 by Ismael Lowery RN  Safety Promotion/Fall Prevention:   safety round/check completed   room organization consistent  Taken 11/12/2023 1200 by Ismael Lowery RN  Safety Promotion/Fall Prevention:   safety round/check completed   room organization consistent  Taken 11/12/2023 1000 by Ismael Lowery RN  Safety Promotion/Fall Prevention:   safety round/check completed   room organization consistent  Taken 11/12/2023 0800 by Ismael Lowery RN  Safety Promotion/Fall Prevention:   safety round/check completed   room organization consistent     Problem: Fluid and Electrolyte Imbalance (Acute Kidney Injury/Impairment)  Goal: Fluid and Electrolyte Balance  Outcome: Ongoing, Progressing     Problem: Oral Intake Inadequate (Acute Kidney Injury/Impairment)  Goal: Optimal Nutrition  Intake  Outcome: Ongoing, Progressing     Problem: Renal Function Impairment (Acute Kidney Injury/Impairment)  Goal: Effective Renal Function  Outcome: Ongoing, Progressing  Intervention: Monitor and Support Renal Function  Recent Flowsheet Documentation  Taken 11/12/2023 1400 by Ismael Lowery RN  Medication Review/Management: medications reviewed  Taken 11/12/2023 1200 by Ismael Lowery RN  Medication Review/Management: medications reviewed  Taken 11/12/2023 1000 by Ismael Lowery RN  Medication Review/Management: medications reviewed  Taken 11/12/2023 0800 by Ismael Lowery RN  Medication Review/Management: medications reviewed     Problem: Skin Injury Risk Increased  Goal: Skin Health and Integrity  Outcome: Ongoing, Progressing  Intervention: Optimize Skin Protection  Recent Flowsheet Documentation  Taken 11/12/2023 1400 by Ismael Lowery RN  Head of Bed (HOB) Positioning: HOB at 20-30 degrees  Taken 11/12/2023 0800 by Ismael Lowery RN  Head of Bed (HOB) Positioning: HOB at 20-30 degrees   Goal Outcome Evaluation:

## 2023-11-13 LAB
A FUMIGATUS IGG SER QL: NEGATIVE
A PULLULANS IGG SER QL: NEGATIVE
ALBUMIN SERPL-MCNC: 3.5 G/DL (ref 3.5–5.2)
ANION GAP SERPL CALCULATED.3IONS-SCNC: 11.5 MMOL/L (ref 5–15)
BUN SERPL-MCNC: 13 MG/DL (ref 8–23)
BUN/CREAT SERPL: 16.7 (ref 7–25)
CALCIUM SPEC-SCNC: 9.1 MG/DL (ref 8.6–10.5)
CHLORIDE SERPL-SCNC: 100 MMOL/L (ref 98–107)
CO2 SERPL-SCNC: 28.5 MMOL/L (ref 22–29)
CREAT SERPL-MCNC: 0.78 MG/DL (ref 0.57–1)
CYTO UR: NORMAL
DEPRECATED RDW RBC AUTO: 51.5 FL (ref 37–54)
EGFRCR SERPLBLD CKD-EPI 2021: 74.5 ML/MIN/1.73
EOSINOPHIL # BLD MANUAL: 0.6 10*3/MM3 (ref 0–0.4)
EOSINOPHIL NFR BLD MANUAL: 1 % (ref 0.3–6.2)
ERYTHROCYTE [DISTWIDTH] IN BLOOD BY AUTOMATED COUNT: 16.2 % (ref 12.3–15.4)
GEN 5 2HR TROPONIN T REFLEX: 27 NG/L
GLUCOSE SERPL-MCNC: 103 MG/DL (ref 65–99)
HCT VFR BLD AUTO: 24.7 % (ref 34–46.6)
HGB BLD-MCNC: 7.8 G/DL (ref 12–15.9)
HYPOCHROMIA BLD QL: ABNORMAL
LAB AP CASE REPORT: NORMAL
LAB AP CLINICAL INFORMATION: NORMAL
LAB AP DIAGNOSIS COMMENT: NORMAL
LACEYELLA SACCHARI AB SER QL ID: NEGATIVE
LYMPHOCYTES # BLD MANUAL: 2.39 10*3/MM3 (ref 0.7–3.1)
LYMPHOCYTES NFR BLD MANUAL: 7 % (ref 5–12)
MCH RBC QN AUTO: 28.7 PG (ref 26.6–33)
MCHC RBC AUTO-ENTMCNC: 31.6 G/DL (ref 31.5–35.7)
MCV RBC AUTO: 90.8 FL (ref 79–97)
MONOCYTES # BLD: 4.18 10*3/MM3 (ref 0.1–0.9)
NEUTROPHILS # BLD AUTO: 52.6 10*3/MM3 (ref 1.7–7)
NEUTROPHILS NFR BLD MANUAL: 88 % (ref 42.7–76)
PATH REPORT.ADDENDUM SPEC: NORMAL
PATH REPORT.FINAL DX SPEC: NORMAL
PATH REPORT.GROSS SPEC: NORMAL
PHOSPHATE SERPL-MCNC: 2.6 MG/DL (ref 2.5–4.5)
PIGEON SERUM IGG QL: NEGATIVE
PLAT MORPH BLD: NORMAL
PLATELET # BLD AUTO: 201 10*3/MM3 (ref 140–450)
PMV BLD AUTO: 11.7 FL (ref 6–12)
POTASSIUM SERPL-SCNC: 3.1 MMOL/L (ref 3.5–5.2)
POTASSIUM SERPL-SCNC: 4.9 MMOL/L (ref 3.5–5.2)
RBC # BLD AUTO: 2.72 10*6/MM3 (ref 3.77–5.28)
S RECTIVIRGULA IGG SER QL ID: NEGATIVE
SODIUM SERPL-SCNC: 140 MMOL/L (ref 136–145)
SPECIMEN STATUS: NORMAL
T VULGARIS IGG SER QL: NEGATIVE
TROPONIN T DELTA: -8 NG/L
TROPONIN T SERPL HS-MCNC: 35 NG/L
VARIANT LYMPHS NFR BLD MANUAL: 4 % (ref 19.6–45.3)
WBC MORPH BLD: NORMAL
WBC NRBC COR # BLD: 59.77 10*3/MM3 (ref 3.4–10.8)

## 2023-11-13 PROCEDURE — 93005 ELECTROCARDIOGRAM TRACING: CPT | Performed by: STUDENT IN AN ORGANIZED HEALTH CARE EDUCATION/TRAINING PROGRAM

## 2023-11-13 PROCEDURE — 94664 DEMO&/EVAL PT USE INHALER: CPT

## 2023-11-13 PROCEDURE — 84484 ASSAY OF TROPONIN QUANT: CPT | Performed by: STUDENT IN AN ORGANIZED HEALTH CARE EDUCATION/TRAINING PROGRAM

## 2023-11-13 PROCEDURE — 85025 COMPLETE CBC W/AUTO DIFF WBC: CPT | Performed by: INTERNAL MEDICINE

## 2023-11-13 PROCEDURE — 94799 UNLISTED PULMONARY SVC/PX: CPT

## 2023-11-13 PROCEDURE — 80069 RENAL FUNCTION PANEL: CPT | Performed by: INTERNAL MEDICINE

## 2023-11-13 PROCEDURE — 99231 SBSQ HOSP IP/OBS SF/LOW 25: CPT | Performed by: NURSE PRACTITIONER

## 2023-11-13 PROCEDURE — 94761 N-INVAS EAR/PLS OXIMETRY MLT: CPT

## 2023-11-13 PROCEDURE — 25010000002 METHYLPREDNISOLONE PER 40 MG: Performed by: INTERNAL MEDICINE

## 2023-11-13 PROCEDURE — 85007 BL SMEAR W/DIFF WBC COUNT: CPT | Performed by: INTERNAL MEDICINE

## 2023-11-13 PROCEDURE — 84132 ASSAY OF SERUM POTASSIUM: CPT | Performed by: STUDENT IN AN ORGANIZED HEALTH CARE EDUCATION/TRAINING PROGRAM

## 2023-11-13 PROCEDURE — 99232 SBSQ HOSP IP/OBS MODERATE 35: CPT | Performed by: INTERNAL MEDICINE

## 2023-11-13 PROCEDURE — 93010 ELECTROCARDIOGRAM REPORT: CPT | Performed by: INTERNAL MEDICINE

## 2023-11-13 RX ORDER — HYDROCODONE BITARTRATE AND HOMATROPINE METHYLBROMIDE ORAL SOLUTION 5; 1.5 MG/5ML; MG/5ML
5 LIQUID ORAL EVERY 4 HOURS PRN
Status: DISPENSED | OUTPATIENT
Start: 2023-11-13 | End: 2023-11-20

## 2023-11-13 RX ORDER — POTASSIUM CHLORIDE 750 MG/1
40 TABLET, FILM COATED, EXTENDED RELEASE ORAL EVERY 4 HOURS
Status: COMPLETED | OUTPATIENT
Start: 2023-11-13 | End: 2023-11-13

## 2023-11-13 RX ORDER — FUROSEMIDE 20 MG/1
10 TABLET ORAL DAILY
Status: DISCONTINUED | OUTPATIENT
Start: 2023-11-13 | End: 2023-11-22 | Stop reason: HOSPADM

## 2023-11-13 RX ADMIN — FAMOTIDINE 20 MG: 20 TABLET ORAL at 08:26

## 2023-11-13 RX ADMIN — FUROSEMIDE 10 MG: 20 TABLET ORAL at 12:39

## 2023-11-13 RX ADMIN — SERTRALINE 100 MG: 100 TABLET, FILM COATED ORAL at 08:27

## 2023-11-13 RX ADMIN — APIXABAN 5 MG: 5 TABLET, FILM COATED ORAL at 20:49

## 2023-11-13 RX ADMIN — Medication 250 MG: at 20:49

## 2023-11-13 RX ADMIN — METHYLPREDNISOLONE SODIUM SUCCINATE 40 MG: 40 INJECTION, POWDER, LYOPHILIZED, FOR SOLUTION INTRAMUSCULAR; INTRAVENOUS at 16:06

## 2023-11-13 RX ADMIN — IPRATROPIUM BROMIDE AND ALBUTEROL SULFATE 3 ML: 2.5; .5 SOLUTION RESPIRATORY (INHALATION) at 14:53

## 2023-11-13 RX ADMIN — Medication 250 MG: at 08:26

## 2023-11-13 RX ADMIN — APIXABAN 5 MG: 5 TABLET, FILM COATED ORAL at 08:26

## 2023-11-13 RX ADMIN — LATANOPROST 1 DROP: 50 SOLUTION/ DROPS OPHTHALMIC at 20:49

## 2023-11-13 RX ADMIN — METOPROLOL SUCCINATE 25 MG: 25 TABLET, EXTENDED RELEASE ORAL at 08:27

## 2023-11-13 RX ADMIN — IPRATROPIUM BROMIDE AND ALBUTEROL SULFATE 3 ML: 2.5; .5 SOLUTION RESPIRATORY (INHALATION) at 06:57

## 2023-11-13 RX ADMIN — POTASSIUM CHLORIDE 40 MEQ: 750 TABLET, EXTENDED RELEASE ORAL at 08:26

## 2023-11-13 RX ADMIN — METHYLPREDNISOLONE SODIUM SUCCINATE 40 MG: 40 INJECTION, POWDER, LYOPHILIZED, FOR SOLUTION INTRAMUSCULAR; INTRAVENOUS at 06:14

## 2023-11-13 RX ADMIN — HYDROCODONE BITARTRATE AND HOMATROPINE METHYLBROMIDE 5 ML: 1.5; 5 SYRUP ORAL at 16:37

## 2023-11-13 RX ADMIN — POTASSIUM CHLORIDE 40 MEQ: 750 TABLET, EXTENDED RELEASE ORAL at 16:06

## 2023-11-13 RX ADMIN — DOCUSATE SODIUM 50MG AND SENNOSIDES 8.6MG 2 TABLET: 8.6; 5 TABLET, FILM COATED ORAL at 08:26

## 2023-11-13 RX ADMIN — POTASSIUM CHLORIDE 40 MEQ: 750 TABLET, EXTENDED RELEASE ORAL at 12:39

## 2023-11-13 RX ADMIN — HYDROCODONE BITARTRATE AND HOMATROPINE METHYLBROMIDE 5 ML: 1.5; 5 SYRUP ORAL at 20:49

## 2023-11-13 NOTE — PROGRESS NOTES
Name: Catherine Boyer ADMIT: 2023   : 1940  PCP: Kristi Moreau APRN    MRN: [id number] LOS: 5 days   AGE/SEX: 83 y.o. female  ROOM: [facility]     Subjective     No acute events. Patient denies new complaints.   No family at bedside.    Objective   Objective   Vital Signs  Temp:  [97.7 °F (36.5 °C)-98.8 °F (37.1 °C)] 97.9 °F (36.6 °C)  Heart Rate:  [] 96  Resp:  [16] 16  BP: (102-125)/(46-60) 109/48  SpO2:  [95 %-99 %] 95 %  on  Flow (L/min):  [2] 2;   Device (Oxygen Therapy): nasal cannula  Body mass index is 25.76 kg/m².  Physical Exam  Vitals and nursing note reviewed.   Constitutional:       General: She is not in acute distress.     Appearance: She is not toxic-appearing.      Comments: Elderly, frail   HENT:      Head: Normocephalic and atraumatic.      Nose: Nose normal.      Mouth/Throat:      Mouth: Mucous membranes are moist.      Pharynx: Oropharynx is clear.   Eyes:      Conjunctiva/sclera: Conjunctivae normal.      Pupils: Pupils are equal, round, and reactive to light.   Cardiovascular:      Rate and Rhythm: Normal rate and regular rhythm.   Pulmonary:      Effort: Pulmonary effort is normal.      Breath sounds: Examination of the right-lower field reveals decreased breath sounds. Examination of the left-lower field reveals decreased breath sounds. Decreased breath sounds present.   Abdominal:      General: Bowel sounds are normal. There is no distension.      Palpations: Abdomen is soft.      Tenderness: There is no abdominal tenderness.   Musculoskeletal:         General: No swelling or tenderness.      Cervical back: Neck supple.   Skin:     General: Skin is warm and dry.      Capillary Refill: Capillary refill takes less than 2 seconds.   Neurological:      General: No focal deficit present.      Mental Status: She is alert.      Comments: Hard of hearing       Results Review     I reviewed the patient's new clinical results.  Results from last 7 days   Lab Units 23  2179  "11/06/23  2100 11/06/23  0611 11/03/23  0540 11/02/23  1631   WBC 10*3/mm3 35.70*  --  41.43* 39.23* 44.77*   HEMOGLOBIN g/dL 7.7* 8.1* 6.8* 7.6* 8.1*   PLATELETS 10*3/mm3 278  --  271 413 522*     Results from last 7 days   Lab Units 11/07/23  0506 11/06/23  0611 11/05/23  0905 11/05/23  0124   SODIUM mmol/L 138 138 139 137   POTASSIUM mmol/L 3.5 3.5 3.8 3.9   CHLORIDE mmol/L 107 109* 108* 105   CO2 mmol/L 23.6 20.9* 21.4* 22.0   BUN mg/dL 11 16 26* 33*   CREATININE mg/dL 0.88 0.94 1.34* 1.56*   GLUCOSE mg/dL 97 95 112* 170*   Estimated Creatinine Clearance: 44 mL/min (by C-G formula based on SCr of 0.88 mg/dL).  Results from last 7 days   Lab Units 11/07/23  0506 11/06/23  0611 11/05/23  0905 11/04/23  0626 11/02/23  1631   ALBUMIN g/dL 3.5 3.2* 3.6 3.5 3.7   BILIRUBIN mg/dL  --   --   --   --  0.5   ALK PHOS U/L  --   --   --   --  115   AST (SGOT) U/L  --   --   --   --  22   ALT (SGPT) U/L  --   --   --   --  17     Results from last 7 days   Lab Units 11/07/23  0506 11/06/23  0611 11/05/23  0905 11/05/23  0124 11/04/23  0626   CALCIUM mg/dL 8.3* 8.1* 8.3* 7.9* 8.2*   ALBUMIN g/dL 3.5 3.2* 3.6  --  3.5   MAGNESIUM mg/dL  --   --   --  1.9  --    PHOSPHORUS mg/dL 1.8* 1.7* 2.0*  --  3.3     Results from last 7 days   Lab Units 11/05/23  0905 11/05/23  0124 11/04/23  2247 11/04/23  1910 11/04/23  1603 11/04/23  0626   PROCALCITONIN ng/mL  --   --   --   --   --  0.09   LACTATE mmol/L 1.8 3.2* 3.1* 3.2*   < >  --     < > = values in this interval not displayed.     COVID19   Date Value Ref Range Status   08/13/2023 Not Detected Not Detected - Ref. Range Final   10/29/2022 Detected (C) Not Detected - Ref. Range Final     No results found for: \"HGBA1C\", \"POCGLU\"  Results for orders placed or performed during the hospital encounter of 11/02/23   Blood Culture - Blood, Arm, Right    Specimen: Arm, Right; Blood   Result Value Ref Range    Blood Culture No growth at 3 days          CT Chest Without Contrast " Diagnostic  Narrative: CT CHEST WO CONTRAST DIAGNOSTIC-     INDICATIONS: Lung mass     TECHNIQUE: Radiation dose reduction techniques were utilized, including  automated exposure control and exposure modulation based on body size.  Unenhanced CHEST CT     COMPARISON: 5/7/2023     FINDINGS:           The heart size is borderline without pericardial effusion. Prominent  coronary arterial calcifications are apparent. Ascending aorta is  dilated, 3.8 cm, not significantly changed. A few small subcentimeter  short axis mediastinal lymph nodes are seen that are not significant by  size criteria. Assessment of vascular, mediastinal, and hilar structures  is limited without intravenous contrast material. Debris is seen in the  esophagus.     The airways appear clear.     No pleural effusion or pneumothorax.     The lungs show again show a pleural-based density in the lingula, axial  image 58, also present the abdomen/pelvis CT from 11/2/2023 (and appears  similar apart from increased extension medially), but representing a  change from the chest CT from 5/7/2023, may be rounded atelectasis,  pneumonia, or neoplasm. Since the recent abdomen/pelvis CT,  consolidation has developed anteriorly laterally in the left lower lobe  suspicious for pneumonia. Fairly extensive groundglass and consolidative  opacities are present throughout both lungs, especially mid to upper  lungs that may represent edema and/or atypical pneumonia.        Upper abdominal structures show no acute findings. Gallbladder is  surgically absent. 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 Mild  nonspecific thickening of the adrenal glands.     Degenerative changes are seen in the spine, shoulders. No acute fracture  is identified.     Impression:    Redemonstration of pleural-based density in the lingula, showing  increased extension medially, representing a change from the chest CT  from 5/7/2023, may be rounded atelectasis, pneumonia, or neoplasm;  correlate  clinically, PET/CT correlation can be obtained as indicated,  in addition to continued CT follow-up. Since the recent abdomen/pelvis  CT, consolidation has developed anteriorly laterally in the left lower  lobe suspicious for pneumonia. Fairly extensive groundglass and  consolidative opacities are present throughout both lungs, especially  mid to upper lungs that may represent edema and/or atypical pneumonia           This report was finalized on 11/4/2023 9:17 AM by Dr. Reilly Ritter M.D on Workstation: [facility]       Scheduled Medications  ertapenem, 1,000 mg, Intravenous, Q24H  famotidine, 20 mg, Oral, Daily  latanoprost, 1 drop, Both Eyes, Nightly  metoprolol succinate XL, 25 mg, Oral, Daily  Momelotinib Dihydrochloride, 200 mg, Oral, Daily  saccharomyces boulardii, 250 mg, Oral, BID  senna-docusate sodium, 2 tablet, Oral, BID  sertraline, 100 mg, Oral, Daily    Infusions     Diet  NPO Diet NPO Type: Strict NPO    I reviewed Imaging, labs, microbiology, ECG/telemetry, and records.        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   Acute Cystitis without Hematuria  - urine culture growing ESBL  - continue ertapenem    KARLI  - likely from volume depletion from reduced oral intake and diarrhea in the setting of diuretic treatment  - resolved with IVF, these are now stopped  - continue holding diuretics  - appreciate  nephrology recs    Lung mass  - indeterminate, lingular   - there were plans for bronchoscopy today but this was cancelled due to low hemoglobin, transfusing another 1 unit of PRBCs today-there are plans for bronchoscopy tomorrow  - appreciate pulmonology recs, d/w Dr. Alford    Aortic Stenosis/Chronic Diastolic CHF  - appears euvolemic  - continue to monitor volume status off of diuretics    Coronary artery disease  Paroxysmal atrial fibrillation  Hypertension  - Eliquis and Plavix held for planned bronchoscopy  - HR and BP controlled, continue metoprolol    Myeloproliferative disorder  - has chronic leukocytosis  - s/p 1 unit of PRBCs 11/6/23 with good response, transfusing 1 unit of PRBCs today as above      SCDs for dvt ppx   Full code.  Discussed with patient, nursing staff, and consulting provider.  Anticipate discharge home with HH vs SNU facility in 2-3 days.      Kenton Meeks MD  [facility]  11/07/23  15:56 EST    Portions of this note have been copied and I have reviewed these. They are accurate as of 11/7/2023           General Sunscreen Counseling: I recommended a broad spectrum sunscreen with a SPF of 30 or higher. I explained that SPF 30 sunscreens block approximately 97 percent of the sun's harmful rays. Sunscreens should be applied at least 15 minutes prior to expected sun exposure and then every 2 hours after that as long as sun exposure continues. If swimming or exercising sunscreen should be reapplied every 45 minutes to an hour after getting wet or sweating. One ounce, or the equivalent of a shot glass full of sunscreen, is adequate to protect the skin not covered by a bathing suit. I also recommended a lip balm with a sunscreen as well. Sun protective clothing can be used in lieu of sunscreen but must be worn the entire time you are exposed to the sun's rays. Detail Level: Generalized

## 2023-11-13 NOTE — PLAN OF CARE
Problem: Adult Inpatient Plan of Care  Goal: Plan of Care Review  Outcome: Ongoing, Progressing  Goal: Patient-Specific Goal (Individualized)  Outcome: Ongoing, Progressing  Goal: Absence of Hospital-Acquired Illness or Injury  Outcome: Ongoing, Progressing  Intervention: Identify and Manage Fall Risk  Recent Flowsheet Documentation  Taken 11/13/2023 1400 by Ismael Lowery RN  Safety Promotion/Fall Prevention:   safety round/check completed   room organization consistent  Taken 11/13/2023 1200 by Ismael Lowery RN  Safety Promotion/Fall Prevention:   safety round/check completed   room organization consistent  Taken 11/13/2023 1000 by Ismael Lowery RN  Safety Promotion/Fall Prevention:   safety round/check completed   room organization consistent  Taken 11/13/2023 0800 by Ismael Lowery RN  Safety Promotion/Fall Prevention:   safety round/check completed   room organization consistent  Intervention: Prevent Skin Injury  Recent Flowsheet Documentation  Taken 11/13/2023 1400 by Ismael Lowery RN  Body Position: position changed independently  Taken 11/13/2023 0800 by Ismael Lowery RN  Body Position: position changed independently  Skin Protection:   adhesive use limited   transparent dressing maintained  Intervention: Prevent and Manage VTE (Venous Thromboembolism) Risk  Recent Flowsheet Documentation  Taken 11/13/2023 1400 by Ismael Lowery RN  Activity Management: activity encouraged  Taken 11/13/2023 0800 by Ismael Lowery RN  Activity Management: activity encouraged  Intervention: Prevent Infection  Recent Flowsheet Documentation  Taken 11/13/2023 1400 by Ismael Lowery RN  Infection Prevention:   rest/sleep promoted   single patient room provided  Taken 11/13/2023 1200 by Ismael Lowery RN  Infection Prevention:   single patient room provided   rest/sleep promoted  Taken 11/13/2023 1000 by Ismael Lowery RN  Infection Prevention:   single patient room provided   rest/sleep promoted  Taken  11/13/2023 0800 by Ismael Lowery RN  Infection Prevention:   single patient room provided   rest/sleep promoted  Goal: Optimal Comfort and Wellbeing  Outcome: Ongoing, Progressing  Intervention: Monitor Pain and Promote Comfort  Recent Flowsheet Documentation  Taken 11/13/2023 0800 by Ismael Lowery RN  Pain Management Interventions: position adjusted  Intervention: Provide Person-Centered Care  Recent Flowsheet Documentation  Taken 11/13/2023 1400 by Ismael Lowery RN  Trust Relationship/Rapport: care explained  Taken 11/13/2023 0800 by Ismael Lowery RN  Trust Relationship/Rapport:   care explained   questions encouraged  Goal: Readiness for Transition of Care  Outcome: Ongoing, Progressing     Problem: Adjustment to Illness (Sepsis/Septic Shock)  Goal: Optimal Coping  Outcome: Ongoing, Progressing     Problem: Bleeding (Sepsis/Septic Shock)  Goal: Absence of Bleeding  Outcome: Ongoing, Progressing     Problem: Glycemic Control Impaired (Sepsis/Septic Shock)  Goal: Blood Glucose Level Within Desired Range  Outcome: Ongoing, Progressing  Intervention: Optimize Glycemic Control  Recent Flowsheet Documentation  Taken 11/13/2023 1400 by Ismael Lowery RN  Glycemic Management: blood glucose monitored  Taken 11/13/2023 0800 by Ismael Lowery RN  Glycemic Management: blood glucose monitored     Problem: Infection Progression (Sepsis/Septic Shock)  Goal: Absence of Infection Signs and Symptoms  Outcome: Ongoing, Progressing  Intervention: Initiate Sepsis Management  Recent Flowsheet Documentation  Taken 11/13/2023 1400 by Ismael Lowery RN  Infection Prevention:   rest/sleep promoted   single patient room provided  Isolation Precautions:   precautions maintained   contact  Taken 11/13/2023 1200 by Ismael Lowery RN  Infection Prevention:   single patient room provided   rest/sleep promoted  Isolation Precautions:   precautions maintained   contact  Taken 11/13/2023 1000 by Ismael Lowery RN  Infection  Prevention:   single patient room provided   rest/sleep promoted  Isolation Precautions:   precautions maintained   contact  Taken 11/13/2023 0800 by Ismael Lowery RN  Infection Prevention:   single patient room provided   rest/sleep promoted  Isolation Precautions:   precautions maintained   contact  Intervention: Promote Recovery  Recent Flowsheet Documentation  Taken 11/13/2023 1400 by Ismael Lowery RN  Activity Management: activity encouraged  Taken 11/13/2023 0800 by Ismael Lowery RN  Activity Management: activity encouraged     Problem: Nutrition Impaired (Sepsis/Septic Shock)  Goal: Optimal Nutrition Intake  Outcome: Ongoing, Progressing     Problem: Fall Injury Risk  Goal: Absence of Fall and Fall-Related Injury  Outcome: Ongoing, Progressing  Intervention: Identify and Manage Contributors  Recent Flowsheet Documentation  Taken 11/13/2023 1400 by Ismael Lowery RN  Medication Review/Management: medications reviewed  Taken 11/13/2023 1200 by Ismael Lowery RN  Medication Review/Management: medications reviewed  Taken 11/13/2023 1000 by Ismael Lowery RN  Medication Review/Management: medications reviewed  Taken 11/13/2023 0800 by Ismael Lowery RN  Medication Review/Management: medications reviewed  Intervention: Promote Injury-Free Environment  Recent Flowsheet Documentation  Taken 11/13/2023 1400 by Ismael Lowery RN  Safety Promotion/Fall Prevention:   safety round/check completed   room organization consistent  Taken 11/13/2023 1200 by Ismael Lowery RN  Safety Promotion/Fall Prevention:   safety round/check completed   room organization consistent  Taken 11/13/2023 1000 by Ismael Lowery RN  Safety Promotion/Fall Prevention:   safety round/check completed   room organization consistent  Taken 11/13/2023 0800 by Ismael Lowery RN  Safety Promotion/Fall Prevention:   safety round/check completed   room organization consistent     Problem: Fluid and Electrolyte Imbalance (Acute Kidney  Injury/Impairment)  Goal: Fluid and Electrolyte Balance  Outcome: Ongoing, Progressing     Problem: Oral Intake Inadequate (Acute Kidney Injury/Impairment)  Goal: Optimal Nutrition Intake  Outcome: Ongoing, Progressing     Problem: Renal Function Impairment (Acute Kidney Injury/Impairment)  Goal: Effective Renal Function  Outcome: Ongoing, Progressing  Intervention: Monitor and Support Renal Function  Recent Flowsheet Documentation  Taken 11/13/2023 1400 by Ismael Lowery RN  Medication Review/Management: medications reviewed  Taken 11/13/2023 1200 by Ismael Lowery RN  Medication Review/Management: medications reviewed  Taken 11/13/2023 1000 by Ismael Lowery RN  Medication Review/Management: medications reviewed  Taken 11/13/2023 0800 by Ismael Lowery RN  Medication Review/Management: medications reviewed     Problem: Skin Injury Risk Increased  Goal: Skin Health and Integrity  Outcome: Ongoing, Progressing  Intervention: Optimize Skin Protection  Recent Flowsheet Documentation  Taken 11/13/2023 1400 by Ismael Lowery RN  Head of Bed (HOB) Positioning: HOB at 30-45 degrees  Taken 11/13/2023 0800 by Ismael Lowery RN  Pressure Reduction Techniques:   frequent weight shift encouraged   weight shift assistance provided  Head of Bed (HOB) Positioning: HOB at 30 degrees  Pressure Reduction Devices:   alternating pressure pump (ADD)   positioning supports utilized   heel offloading device utilized  Skin Protection:   adhesive use limited   transparent dressing maintained   Goal Outcome Evaluation:

## 2023-11-13 NOTE — NURSING NOTE
Patient reported chest pain while ambulating to bathroom.    RN was in contact with MD, on the phone with Dr. Luo while incident occurred.     BP was 158/95 at begging of event with heart rate of 150+    Patient was reported to bed and a order for STAT EKG and STAT TROP was ordered by MD    RN was also ordered by MD to get Cardiology consulted back onto patient's case.     At rest, patient's HR returned to baseline 110-120s, MD aware.    And BP rechecked was found to be 145/74    Patient stated the chest pain was subsiding, RN will continue with execution of Mds orders.

## 2023-11-13 NOTE — PROGRESS NOTES
LOS: 11 days     Chief Complaint/ Reason for encounter: KARLI    Subjective   11/06/23 : Patient resting with family at bedside able provide some history  She is doing well overall no new complaints  Weight up today but she is not edematous, not short of breath  She is on 3 L oxygen which is near her home rate  Eating some, drinking well according to family    11/7: No new complaints, bronchoscopy with biopsy scheduled for today  Oral intake remains very low, no shortness of breath chest pain or edema    11/8: Events noted, patient developed a right pneumothorax after her bronchoscopy today.  Higher than normal bleeding during the procedure noted, CT-guided chest tube fortunately not needed  She has been intermittently hypotensive, currently 89/48, O2 requirements about the same, 3 L    11/9: She doing a little better today, fortunately did not need a chest tube  Oxygen requirements near baseline of around 3 L    11/10 family room, says her appetite is low and that she is tired, bp near goal     11/11: Resting, no new complaints or events, no shortness of breath or chest pain overnight  Good appetite    11/12: She looks and feels well denies complaints, still with very poor appetite no dyspnea    11/13: Oral intake a little better per patient family and nursing, no nausea vomiting  No shortness of breath chest pain or edema    Medical history reviewed:  History of Present Illness    Subjective    History taken from: Patient and chart    Vital Signs  Temp:  [98 °F (36.7 °C)-98.7 °F (37.1 °C)] 98.2 °F (36.8 °C)  Heart Rate:  [] 117  Resp:  [14-26] 22  BP: (104-158)/(64-94) 143/74       Wt Readings from Last 1 Encounters:   11/13/23 0600 66.4 kg (146 lb 6.2 oz)   11/12/23 0509 66.7 kg (147 lb)   11/11/23 0545 67.1 kg (147 lb 14.9 oz)   11/10/23 0600 67.8 kg (149 lb 7.6 oz)   11/09/23 0600 67.7 kg (149 lb 4 oz)   11/08/23 0428 67.7 kg (149 lb 4 oz)   11/07/23 0600 67.6 kg (149 lb 0.5 oz)   11/06/23 0605 67.6 kg  "(149 lb 0.5 oz)   11/05/23 0507 62.6 kg (138 lb)   11/04/23 0539 62.6 kg (138 lb)   11/03/23 0556 63 kg (138 lb 14.2 oz)       Objective:  Vital signs: (most recent): Blood pressure 143/74, pulse 117, temperature 98.2 °F (36.8 °C), temperature source Oral, resp. rate 22, height 162 cm (63.78\"), weight 66.4 kg (146 lb 6.2 oz), SpO2 99%.                Objective:  General Appearance:  Comfortable, well-appearing, in no acute distress and not in pain.  Awake, alert, oriented  HEENT: Mucous membranes moist, no injury, oropharynx clear  Lungs:  Normal effort and normal respiratory rate.  Breath sounds clear to auscultation.  No  respiratory distress.  No rales, decreased breath sounds or rhonchi.    Heart: Normal rate.  Regular rhythm.  S1, S2 normal.  No murmur.   Abdomen: Abdomen is soft.  Bowel sounds are normal, no abdominal tenderness.  There is no rebound or guarding  Extremities: no edema of bilateral lower extremities  Neurological: No focal motor or sensory deficits, pupils reactive  Skin:  Warm and dry.  No rash or cyanosis.       Results Review:    Intake/Output:     Intake/Output Summary (Last 24 hours) at 11/13/2023 1023  Last data filed at 11/13/2023 0800  Gross per 24 hour   Intake 600 ml   Output 1800 ml   Net -1200 ml         DATA:  Radiology and Labs:  The following labs independently reviewed by me. Additional labs ordered for tomorrow a.m.  Interval notes, chart personally reviewed by me.   Old records independently reviewed showing normal baseline creatinine, history of CHF  Discussed with patient's family at bedside    Risk/ complexity of medical care/ medical decision making moderate risk, KARLI, fluid and electrolyte management    Labs:   Recent Results (from the past 24 hour(s))   Renal Function Panel    Collection Time: 11/13/23  5:41 AM    Specimen: Blood   Result Value Ref Range    Glucose 103 (H) 65 - 99 mg/dL    BUN 13 8 - 23 mg/dL    Creatinine 0.78 0.57 - 1.00 mg/dL    Sodium 140 136 - 145 " mmol/L    Potassium 3.1 (L) 3.5 - 5.2 mmol/L    Chloride 100 98 - 107 mmol/L    CO2 28.5 22.0 - 29.0 mmol/L    Calcium 9.1 8.6 - 10.5 mg/dL    Albumin 3.5 3.5 - 5.2 g/dL    Phosphorus 2.6 2.5 - 4.5 mg/dL    Anion Gap 11.5 5.0 - 15.0 mmol/L    BUN/Creatinine Ratio 16.7 7.0 - 25.0    eGFR 74.5 >60.0 mL/min/1.73   CBC Auto Differential    Collection Time: 11/13/23  5:41 AM    Specimen: Blood   Result Value Ref Range    WBC 59.77 (C) 3.40 - 10.80 10*3/mm3    RBC 2.72 (L) 3.77 - 5.28 10*6/mm3    Hemoglobin 7.8 (L) 12.0 - 15.9 g/dL    Hematocrit 24.7 (L) 34.0 - 46.6 %    MCV 90.8 79.0 - 97.0 fL    MCH 28.7 26.6 - 33.0 pg    MCHC 31.6 31.5 - 35.7 g/dL    RDW 16.2 (H) 12.3 - 15.4 %    RDW-SD 51.5 37.0 - 54.0 fl    MPV 11.7 6.0 - 12.0 fL    Platelets 201 140 - 450 10*3/mm3   Manual Differential    Collection Time: 11/13/23  5:41 AM    Specimen: Blood   Result Value Ref Range    Neutrophil % 88.0 (H) 42.7 - 76.0 %    Lymphocyte % 4.0 (L) 19.6 - 45.3 %    Monocyte % 7.0 5.0 - 12.0 %    Eosinophil % 1.0 0.3 - 6.2 %    Neutrophils Absolute 52.60 (H) 1.70 - 7.00 10*3/mm3    Lymphocytes Absolute 2.39 0.70 - 3.10 10*3/mm3    Monocytes Absolute 4.18 (H) 0.10 - 0.90 10*3/mm3    Eosinophils Absolute 0.60 (H) 0.00 - 0.40 10*3/mm3    Hypochromia Mod/2+ None Seen    WBC Morphology Normal Normal    Platelet Morphology Normal Normal   ECG 12 Lead Chest Pain    Collection Time: 11/13/23  9:17 AM   Result Value Ref Range    QT Interval 376 ms    QTC Interval 498 ms       Radiology:  Pertinent radiology studies were reviewed as described above      Medications have been reviewed separately in chart overview    Narrative & Impression   Portable chest radiograph     HISTORY: Pneumothorax     TECHNIQUE: Single AP portable radiograph of the chest     COMPARISON: Chest radiograph 11/8/2023     IMPRESSION:  FINDINGS AND IMPRESSION:  Previously seen right pneumothorax has decreased in size resulting in  approximately 1.6 cm in pleural-parenchymal  separation (previously 2.8  cm). There is moderate pulmonary opacification throughout bilateral  lungs, right greater than left, suggestive of multifocal pneumonia  and/or pulmonary edema in the appropriate clinical context and  correlation with patient history is recommended with follow-up chest CT  if clinically indicated. Given the somewhat masslike opacification  overlying the right lung, at least continued attention on follow-up  chest radiograph in 4 to 6 weeks is recommended to ensure appropriate  evolution/resolution and exclude any possibility of neoplasm.            ASSESSMENT:  Acute kidney injury, prerenal, rapidly improving and near baseline  Chronic diastolic CHF - EF 55%   Nausea vomiting and dehydration, better  Left lingular lung mass  Myeloproliferative disorder, hematology now following  Chronic leukocytosis  Atrial fibrillation  Diarrhea, slowly resolving  Acute on chronic anemia  Diabetes mellitus  UTI on ertapenem, ESBL E. coli  Lung mass, biopsy 11/8  Postop pneumothorax, had improved by the time chest tube was to be placed      DISCUSSION/PLAN:   Renal function, volume and electrolytes remain at goal  Oral intake seems to be improving  We will plan to restart very low-dose oral Lasix today and titrate as tolerated  Discussed plans with family at bedside  Potassium is being replaced orally, recheck magnesium     Continue to monitor electrolytes and volume closely  Follow-up a.m. labs      Darrell Hanley MD  [facility]   Office phone number: [phone]  Answering service phone number: [phone]    11/13/23  10:23 EST    Dictation performed using Dragon dictation software

## 2023-11-13 NOTE — CASE MANAGEMENT/SOCIAL WORK
Continued Stay Note  [facility]     Patient Name: Catherine Boyer  MRN: [id number]  Today's Date: 11/13/2023    Admit Date: 11/2/2023    Plan: SNF pending precert. [facility]errals to [facility] pending.   Discharge Plan       Row Name 11/13/23 1412       Plan    Plan SNF pending precert. Referrals to [facility] pending.    Plan Comments Palliative caree has met with pt, not planning palliative care at this time. S/W [facility]/ [facility] - she will eval for [facility].  Pt will need [facility] precert..........Cherry CONTRERAS/ RENITA                   Discharge Codes    No documentation.                 Expected Discharge Date and Time       Expected Discharge Date Expected Discharge Time    Nov 13, 2023               Cherry Bowen RN

## 2023-11-13 NOTE — SIGNIFICANT NOTE
11/13/23 1409   OTHER   Discipline physical therapy assistant   Rehab Time/Intention   Session Not Performed other (see comments)  (RN requested to hold PT in AM and PM d/t cardiac events today; PT will f/u tomorrow if appropriate)   Recommendation   PT - Next Appointment 11/14/23

## 2023-11-13 NOTE — PROGRESS NOTES
Name: Catherine Boyer ADMIT: 2023   : 1940  PCP: Kristi Moreau APRN    MRN: [id number] LOS: 11 days   AGE/SEX: 83 y.o. female  ROOM: [facility]     Subjective   Subjective   Patient seen and examined this morning.  At time of my examination, patient is awake, alert and resting in bed, no acute distress or complaints at present. Brother at bedside, we discussed about palliative discussion and plan to get back to rehab.        Objective   Objective   Vital Signs  Temp:  [98 °F (36.7 °C)-98.7 °F (37.1 °C)] 98.4 °F (36.9 °C)  Heart Rate:  [] 104  Resp:  [16-26] 18  BP: (104-158)/(64-94) 105/66  SpO2:  [94 %-99 %] 97 %  on  Flow (L/min):  [3] 3;   Device (Oxygen Therapy): nasal cannula  Body mass index is 25.3 kg/m².  Physical Exam  Vitals and nursing note reviewed.   Constitutional:       General: She is not in acute distress.     Comments: Elderly, chronically ill-appearing   Cardiovascular:      Rate and Rhythm: Tachycardia present. Rhythm irregular.      Pulses: Normal pulses.      Heart sounds: Normal heart sounds.   Pulmonary:      Effort: Pulmonary effort is normal. No respiratory distress.      Breath sounds: Normal breath sounds. No wheezing.   Abdominal:      General: Bowel sounds are normal.      Palpations: Abdomen is soft.      Tenderness: There is no abdominal tenderness.   Skin:     General: Skin is warm and dry.      Coloration: Skin is pale.   Neurological:      Mental Status: She is alert.       Results Review     I reviewed the patient's new clinical results.  Results from last 7 days   Lab Units 23  0541 23  0728 23  0702 11/10/23  1300   WBC 10*3/mm3 59.77* 59.91* 64.69* 58.19*   HEMOGLOBIN g/dL 7.8* 8.3* 8.2* 7.9*   PLATELETS 10*3/mm3 201 224 202 188     Results from last 7 days   Lab Units 23  0541 23  0728 23  0702 11/10/23  0607   SODIUM mmol/L 140 140 141 142   POTASSIUM mmol/L 3.1* 3.8 3.8 3.9   CHLORIDE mmol/L 100 102 105 108*   CO2 mmol/L  "28.5 28.0 25.0 24.3   BUN mg/dL 13 13 14 17   CREATININE mg/dL 0.78 0.81 0.72 0.82   GLUCOSE mg/dL 103* 104* 103* 116*   EGFR mL/min/1.73 74.5 71.2 82.1 70.2     Results from last 7 days   Lab Units 11/13/23  0541 11/12/23  0728 11/11/23  0702 11/10/23  0607   ALBUMIN g/dL 3.5 4.1 3.8 3.4*     Results from last 7 days   Lab Units 11/13/23  0541 11/12/23  0728 11/11/23  0702 11/10/23  0607 11/09/23  0214 11/09/23  0008   CALCIUM mg/dL 9.1 9.5 9.3 8.9   < >  --    ALBUMIN g/dL 3.5 4.1 3.8 3.4*   < >  --    MAGNESIUM mg/dL  --   --  1.6  --   --  1.6   PHOSPHORUS mg/dL 2.6 2.9 2.6 3.4   < >  --     < > = values in this interval not displayed.           No results found for: \"HGBA1C\", \"POCGLU\"    No radiology results for the last day    I have personally reviewed all medications:  Scheduled Medications  apixaban, 5 mg, Oral, Q12H  famotidine, 20 mg, Oral, Daily  furosemide, 10 mg, Oral, Daily  ipratropium-albuterol, 3 mL, Nebulization, Q8H - RT  latanoprost, 1 drop, Both Eyes, Nightly  lidocaine, 20 mL, Infiltration, Once  methylPREDNISolone sodium succinate, 40 mg, Intravenous, Q12H  metoprolol succinate XL, 25 mg, Oral, Daily  Momelotinib Dihydrochloride, 200 mg, Oral, Daily  saccharomyces boulardii, 250 mg, Oral, BID  senna-docusate sodium, 2 tablet, Oral, BID  sertraline, 100 mg, Oral, Daily    Infusions   Diet  Diet: Cardiac Diets, Diabetic Diets; Healthy Heart (2-3 Na+); Consistent Carbohydrate; Texture: Regular Texture (IDDSI 7); Fluid Consistency: Thin (IDDSI 0)    I have personally reviewed:  [x]  Laboratory   [x]  Microbiology   [x]  Radiology   [x]  EKG/Telemetry  [x]  Cardiology/Vascular   []  Pathology    []  Records       Assessment/Plan     Active Hospital Problems    Diagnosis  POA    **Acute kidney injury [N17.9]  Yes    Iatrogenic pneumothorax [J95.811]  No    Pulmonary infiltrate [R91.8]  Unknown    Myelofibrosis [D75.81]  Yes    Personal history of transient ischemic attack (TIA), and cerebral " 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       83 y.o. female admitted with Acute kidney injury.    Acute Cystitis without Hematuria  - Urine culture grew ESBL.  - Completed ertapenem     KARLI  - Resolved with IVF  - Order repeat BMP in AM for reassessment.  - Appreciate nephrology recs- lasix restarted     Lung mass  - Indeterminate, lingular on imaging. s/p bronchoscopy 11/8/23 with biopsies, pathology showing no evidence of malignancy but only 3 biopsies (as opposed to 6-10) were obtained due to bleeding  - has small iatrogenic pneumothorax, improved with conservative treatment  - follow up chest CT scan without contrast in 6-8 weeks  - appreciate pulmonology recs, they have signed off-follow up with [facility] for Dr. Billings, in 8 weeks     Aortic Stenosis/Chronic Diastolic CHF  - At present, patient appears stable from this perspective.  She does not have any evidence to suggest acute heart failure exacerbation.  No acute intervention warranted, however, do need to closely monitor.  - Strict I/O, daily weights, telemetry, pulse oximetry, low sodium diet.  - Closely monitor volume status.    Coronary artery disease  Paroxysmal atrial fibrillation  Hypertension  - Eliquis and Plavix held for bronchoscopy. Eliquis has since been resumed, but Plavix still held. Notified by nursing this afternoon, HR intermittently more elevated despite current Metoprolol dose. Blood pressure slightly soft but stable, gave x1 dose of IV Metoprolol 2.5 mg, which has led to improvement for now. Planning to continue monitoring. If HR remains elevated, will require  adjustment to Metoprolol oral dose if blood pressure can tolerate.     Myeloproliferative disorder/Anemia  Functional iron deficiency  Leukocytosis  - Has chronic leukocytosis, values elevated on most recent labs, however, relatively stable from prior.  - s/p 1 unit of PRBCs 11/6/23 and 11/7/23 with good response. Iron studies showing mixed picture, but does meet criteria for functional iron deficiency with history of CHF and ferritin <500 and TSAT <20. B12 elevated, folate normal.   - Hemoglobin low on most recent labs, however, stable from prior. She received x1 dose of Ferrlecit on 11/11 with good response.  - oncology evaluated, o/p f/u planned- signed off  - Order repeat CBC in AM for reassessment. Continue to monitor, transfuse for hemoglobin <7.    SCDs for DVT prophylaxis.  Full code.  Discussed with patient, family, and nursing staff.  Anticipate discharge to SNU facility once arrangements have been made.      Chandni Luo MD  [facility]  11/13/23

## 2023-11-13 NOTE — PROGRESS NOTES
.            [facility]    Palliative Care Daily Progress Note   Chief complaint-follow up goals of care/advanced care planning and support for patient/family    Code Status:   Code Status and Medical Interventions:   Ordered at: 11/02/23 1949     Code Status (Patient has no pulse and is not breathing):    CPR (Attempt to Resuscitate)     Medical Interventions (Patient has pulse or is breathing):    Full      Advanced Directives: Advance Directive Status: Patient has advance directive, copy in chart   Goals of Care: Ongoing.     S: Medical record reviewed. Events noted.  Patient sleeping and arouses easily. She has no complaints. She worked with PT earlier today and had some chest pain but resolved. She denies shortness of air, pain or nausea at this time. No family present. I reviewed labs, medical notes, therapy notes, and MAR. I spoke with            Review of Systems   Constitutional: Positive for decreased appetite and malaise/fatigue.   Cardiovascular:  Positive for chest pain (earlier this morning, resolved).   Respiratory:  Negative for shortness of breath.    Gastrointestinal:  Negative for abdominal pain and nausea.   Neurological:  Positive for weakness.   Psychiatric/Behavioral:  The patient is nervous/anxious.      Pain Assessment  Preferred Pain Scale: number (Numeric Rating Pain Scale)  Nonverbal Indicators of Pain: mobility limited, muscle tension  Pain Location: other (see comments) (buttock)  Pain Description: other (see comments), burning (excoriation)    O:     Intake/Output Summary (Last 24 hours) at 11/13/2023 1325  Last data filed at 11/13/2023 0800  Gross per 24 hour   Intake 600 ml   Output 1100 ml   Net -500 ml       Diagnostics and current medications: Reviewed.    Current Facility-Administered Medications   Medication Dose Route Frequency Provider Last Rate Last Admin    acetaminophen (TYLENOL) tablet 650 mg  650 mg Oral Q4H CARLAN Raoul Alford MD    650 mg at 11/08/23 2151    apixaban (ELIQUIS) tablet 5 mg  5 mg Oral Q12H Kenton Meeks MD   5 mg at 11/13/23 0826    sennosides-docusate (PERICOLACE) 8.6-50 MG per tablet 2 tablet  2 tablet Oral BID Raoul Alford MD   2 tablet at 11/13/23 0826    And    polyethylene glycol (MIRALAX) packet 17 g  17 g Oral Daily PRN Raoul Alford MD        And    bisacodyl (DULCOLAX) EC tablet 5 mg  5 mg Oral Daily PRN Raoul Alford MD        And    bisacodyl (DULCOLAX) suppository 10 mg  10 mg Rectal Daily PRN Raoul Alford MD        famotidine (PEPCID) tablet 20 mg  20 mg Oral Daily Raoul Alford MD   20 mg at 11/13/23 0826    furosemide (LASIX) tablet 10 mg  10 mg Oral Daily Darrell Hanley MD   10 mg at 11/13/23 1239    HYDROcodone-acetaminophen (NORCO) 5-325 MG per tablet 1 tablet  1 tablet Oral Q6H PRN Kenton Meeks MD   1 tablet at 11/04/23 1932    ipratropium-albuterol (DUO-NEB) nebulizer solution 3 mL  3 mL Nebulization Q8H - RT Raoul Alford MD   3 mL at 11/13/23 0657    latanoprost (XALATAN) 0.005 % ophthalmic solution 1 drop  1 drop Both Eyes Nightly Raoul Alford MD   1 drop at 11/12/23 2153    lidocaine (XYLOCAINE) 1 % injection 20 mL  20 mL Infiltration Once Kenton Meeks MD        melatonin tablet 3 mg  3 mg Oral Nightly PRN Raoul Alford MD   3 mg at 11/12/23 2153    methylPREDNISolone sodium succinate (SOLU-Medrol) injection 40 mg  40 mg Intravenous Q12H Raoul Alford MD   40 mg at 11/13/23 0614    metoprolol succinate XL (TOPROL-XL) 24 hr tablet 25 mg  25 mg Oral Daily Raoul Alford MD   25 mg at 11/13/23 0827    Momelotinib Dihydrochloride (OJJAARA) 200 mg  200 mg Oral Daily Raoul Alford MD   200 mg at 11/13/23 0829    morphine injection 2 mg  2 mg Intravenous Q3H PRN Raoul Alford MD        nitroglycerin (NITROSTAT) SL tablet 0.4 mg  0.4 mg Sublingual Q5 Min PRN Raoul Alford MD   0.4 mg at 11/08/23 4214    ondansetron (ZOFRAN) tablet 4 mg  4 mg Oral  "Q6H PRN Raoul Alford MD        Or    ondansetron (ZOFRAN) injection 4 mg  4 mg Intravenous Q6H PRN Raoul Alford MD   4 mg at 11/06/23 1156    Phosphorus Replacement - Follow Nurse / BPA Driven Protocol   Does not apply PRN Raoul Alford MD        potassium chloride (K-DUR,KLOR-CON) ER tablet 40 mEq  40 mEq Oral Q4H Chandni Luo MD   40 mEq at 11/13/23 1239    Potassium Replacement - Follow Nurse / BPA Driven Protocol   Does not apply PRN Raoul Alford MD        saccharomyces boulardii (FLORASTOR) capsule 250 mg  250 mg Oral BID Raoul Alford MD   250 mg at 11/13/23 0826    sertraline (ZOLOFT) tablet 100 mg  100 mg Oral Daily Raoul Alford MD   100 mg at 11/13/23 0827    simethicone (MYLICON) chewable tablet 80 mg  80 mg Oral 4x Daily PRN Raoul Alford MD   80 mg at 11/10/23 1906    sodium chloride 0.9 % flush 10 mL  10 mL Intravenous PRN Raoul Alford MD              acetaminophen    senna-docusate sodium **AND** polyethylene glycol **AND** bisacodyl **AND** bisacodyl    HYDROcodone-acetaminophen    melatonin    Morphine    nitroglycerin    ondansetron **OR** ondansetron    Phosphorus Replacement - Follow Nurse / BPA Driven Protocol    Potassium Replacement - Follow Nurse / BPA Driven Protocol    simethicone    Insert Peripheral IV **AND** sodium chloride  Attest that current medications reviewed including but not limited to prescriptions, over-the counter, herbals and vitamin/mineral/dietary (nutritional) supplements for name, route of administration, type, dose and frequency and are current using all immediate resources available at time of dictation.    A:    /66 (BP Location: Left arm, Patient Position: Lying)   Pulse (!) 122   Temp 98.4 °F (36.9 °C) (Oral)   Resp 20   Ht 162 cm (63.78\")   Wt 66.4 kg (146 lb 6.2 oz)   SpO2 94%   BMI 25.30 kg/m²     Constitutional:       General: Sleeping.      Appearance: Not in distress. Chronically ill-appearing.      " Interventions: Nasal cannula in place.   HENT:      Head:      Comments: [place]  Pulmonary:      Effort: Pulmonary effort is normal.      Breath sounds: Normal breath sounds.   Cardiovascular:      PMI at left midclavicular line. Tachycardia present. Irregularly irregular rhythm.   Edema:     Peripheral edema absent.   Abdominal:      General: Bowel sounds are normal.      Palpations: Abdomen is soft.      Tenderness: There is no abdominal tenderness.   Neurological:      Mental Status: Alert, oriented to person, place, and time and easily aroused.   Psychiatric:         Mood and Affect: Mood and affect normal.         Speech: Speech normal.         Behavior: Behavior is cooperative.         Thought Content: Thought content normal.         Cognition and Memory: Cognition normal.         Judgment: Judgment normal.         Patient status: Disease state: Controlled with current treatments.  Functional status: Palliative Performance Scale Score: Performance 50% based on the following measures: Ambulation: Mainly sit or lie down, Activity and Evidence of Disease: Unable to do any work, extensive evidence of disease, Self-Care: Considerable assistance required,  Intake: Normal or reduced, LOC: Full or confusion   Nutritional status: Albumin 3.5 on 11/ 8/2023 Body mass index is 25.8 kg/m².    Family support: The patient receives support from her children and extended family..  Advance Directives: Advance Directive Status: Patient has advance directive, copy in chart   POA/Healthcare surrogate-Karyn .     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 Provide support with  goals of care  2. [facility] referral  sent           2.  Palliative care encounter  11/8/2023- The patient was sleeping upon my arrival to the room.  She was due to go  down for bronchoscopy and was tired from not much rest during the night.  There were two daughters (Karen and Ila) at bedside and requested to speak with me privately and patient agreeable.  The patient does have 5 children.  She does have durable power  paperwork on file that designates her daughter, Karen and then Karan.  The patient has resided at [facility] for about 1-1/2 years, which was after the death of her .  Ila is the main caregiver ,as her other siblings live out of state.  Both children have a very good understanding of patient's condition, both acute and chronic, which we reviewed.  They report over the last 3 months the patient has been progressively declining with inability to care for herself.  All she wants to do is sleep she is extremely tired and also reduced appetite.  At this time they are uncertain what direction to take.  But they do want her to enjoy what time she has left them to be okay with that. They are willing to support her with any decision she makes.  I provided  both verbal and written information regarding palliative and hospice care.  Ila does acknowledge some caregiver fatigue. We will plan to meet tomorrow and speak with the patient to include her with decision making. I will plan to address CODE status/medical interventions. Of note, there was an attempt to have advance directive completed while inpatient and they patient got upset regarding the conversation per her daughters. I spoke with KARRI Guevara.      11/9/2023- I attempted to see patient twice this morning. The first time she was resting and did engage in conversation. The second time she requested that we have a discussion tomorrow because she is tired and not ready to talk about things today.  "She is aware of my role and wanting to have discussion regarding goals of care. I did meet privately with three of her children (Karen, Ila, and Greg). I provided information regarding palliative care and [facility] to Greg. He has good understanding of his mothers condition and very supportive her. We all discussed grief and the importance of patience right now with their mother. They find that she is very guarded her conversations and tells them she wants to live but her behavior says otherwise. They have some anxiety as they await pathology findings as well.  I will plan to follow up at 10 am tomorrow at patient request. I spoke with KARRI Stone.      11/10/2023- I spoke with patient privately about her goals of care. She has limited understanding of her medical conditions, which we reviewed hospital course in detail. She is aware of guarded prognosis. At this time, she is awaiting biopsy results from bronchoscopy. We discussed if she would be interested in treatment (chemo/immunotherapy/radiation) if malignancy noted and recommended and she states yes. She resides at [facility]  (1.5 years) and wants to return their. She has adjusted since being there, but does acknowledge grief from losing her spouse of 60 years. She enjoys being independent and reports having a good quality of life. I discussed CODE status and medical interventions and she tells me her doctor is suppose to make that decision and informed her that was her decision and we discussed risk vs benefits. She states she needs to think about it more, talk with her doctors and her family. So she will remain FULL code. I did provide information regarding palliative care, [facility] and [facility]. She is \"amenable\" to [facility]. We will hold on referral for now until closer to discharge. At this time, her goal is to continue living, return [facility]  so she can continue to write, draw and enjoy herself. We discussed the importance of nutrition and " therapy (PT/OT) to support her goals. I did inform her of Ila's concerns from feeling overwhelm and consideration of additional resources.  I did have Karen Thorpe and Greg return after conversation and they will continue to have supportive conversation with patient at the request of the patient. She wanted to have it privately. She does request to talk with Dr Meeks, Dr. Reinoso and her daughter, Keri. I will reach out to Dr Meeks and have recommended to family to reach out to Dr Reinoso (outpatient) and their sister on patient behalf. I will plan to follow up Monday to facilitate further goals of care conversation. I spoke with KARRI Yao.     11/13/2023- I had brief follow up conversation with patient. She spoke with her family this weekend and has been updated with biopsy results. Her goals at this time are to go to rehab (if she has too) with hopes or returning to [facility].  She has been participating with therapy and improved her nutrition since our conversation on Friday. She is amenable to [facility] support. She did request that I call her daughter, Karen to confirm these details. I called Karen at [phone] and she confirms plan is rehab then back to assisted living facility with [facility]. I will fax referral.She voiced no other questions or concerns. I will sign off and see PRN. I spoke with Ismael Fenton, RN and RENITA Carey.       Thank you for this consult and allowing us to participate in patient's plan of care. Palliative Care Team will sign off and see PRN.          Eleonora Hercules, ABEL  11/13/2023  13:25 EST

## 2023-11-14 LAB
ALBUMIN SERPL-MCNC: 3.6 G/DL (ref 3.5–5.2)
ANION GAP SERPL CALCULATED.3IONS-SCNC: 12.2 MMOL/L (ref 5–15)
BUN SERPL-MCNC: 13 MG/DL (ref 8–23)
BUN/CREAT SERPL: 14.6 (ref 7–25)
CALCIUM SPEC-SCNC: 9.1 MG/DL (ref 8.6–10.5)
CHLORIDE SERPL-SCNC: 100 MMOL/L (ref 98–107)
CO2 SERPL-SCNC: 26.8 MMOL/L (ref 22–29)
CREAT SERPL-MCNC: 0.89 MG/DL (ref 0.57–1)
DEPRECATED RDW RBC AUTO: 53.4 FL (ref 37–54)
EGFRCR SERPLBLD CKD-EPI 2021: 63.6 ML/MIN/1.73
EOSINOPHIL # BLD MANUAL: 1.45 10*3/MM3 (ref 0–0.4)
EOSINOPHIL NFR BLD MANUAL: 2 % (ref 0.3–6.2)
ERYTHROCYTE [DISTWIDTH] IN BLOOD BY AUTOMATED COUNT: 16.4 % (ref 12.3–15.4)
GLUCOSE SERPL-MCNC: 92 MG/DL (ref 65–99)
HCT VFR BLD AUTO: 26.9 % (ref 34–46.6)
HGB BLD-MCNC: 8.4 G/DL (ref 12–15.9)
LYMPHOCYTES # BLD MANUAL: 2.9 10*3/MM3 (ref 0.7–3.1)
LYMPHOCYTES NFR BLD MANUAL: 4 % (ref 5–12)
MCH RBC QN AUTO: 29 PG (ref 26.6–33)
MCHC RBC AUTO-ENTMCNC: 31.2 G/DL (ref 31.5–35.7)
MCV RBC AUTO: 92.8 FL (ref 79–97)
METAMYELOCYTES NFR BLD MANUAL: 2 % (ref 0–0)
MONOCYTES # BLD: 2.9 10*3/MM3 (ref 0.1–0.9)
MYELOCYTES NFR BLD MANUAL: 2 % (ref 0–0)
NEUTROPHILS # BLD AUTO: 62.4 10*3/MM3 (ref 1.7–7)
NEUTROPHILS NFR BLD MANUAL: 86 % (ref 42.7–76)
NRBC BLD AUTO-RTO: 0.7 /100 WBC (ref 0–0.2)
PHOSPHATE SERPL-MCNC: 2.2 MG/DL (ref 2.5–4.5)
PHOSPHATE SERPL-MCNC: 3.9 MG/DL (ref 2.5–4.5)
PLAT MORPH BLD: NORMAL
PLATELET # BLD AUTO: 203 10*3/MM3 (ref 140–450)
PMV BLD AUTO: 11.8 FL (ref 6–12)
POTASSIUM SERPL-SCNC: 4.7 MMOL/L (ref 3.5–5.2)
QT INTERVAL: 376 MS
QTC INTERVAL: 498 MS
RBC # BLD AUTO: 2.9 10*6/MM3 (ref 3.77–5.28)
RBC MORPH BLD: NORMAL
SODIUM SERPL-SCNC: 139 MMOL/L (ref 136–145)
VARIANT LYMPHS NFR BLD MANUAL: 1 % (ref 0–5)
VARIANT LYMPHS NFR BLD MANUAL: 3 % (ref 19.6–45.3)
WBC MORPH BLD: NORMAL
WBC NRBC COR # BLD: 72.56 10*3/MM3 (ref 3.4–10.8)

## 2023-11-14 PROCEDURE — 85025 COMPLETE CBC W/AUTO DIFF WBC: CPT | Performed by: INTERNAL MEDICINE

## 2023-11-14 PROCEDURE — 80069 RENAL FUNCTION PANEL: CPT | Performed by: INTERNAL MEDICINE

## 2023-11-14 PROCEDURE — 99232 SBSQ HOSP IP/OBS MODERATE 35: CPT | Performed by: NURSE PRACTITIONER

## 2023-11-14 PROCEDURE — 97110 THERAPEUTIC EXERCISES: CPT

## 2023-11-14 PROCEDURE — 94799 UNLISTED PULMONARY SVC/PX: CPT

## 2023-11-14 PROCEDURE — 85007 BL SMEAR W/DIFF WBC COUNT: CPT | Performed by: INTERNAL MEDICINE

## 2023-11-14 PROCEDURE — 94664 DEMO&/EVAL PT USE INHALER: CPT

## 2023-11-14 PROCEDURE — 84100 ASSAY OF PHOSPHORUS: CPT | Performed by: STUDENT IN AN ORGANIZED HEALTH CARE EDUCATION/TRAINING PROGRAM

## 2023-11-14 PROCEDURE — 25010000002 METHYLPREDNISOLONE PER 40 MG: Performed by: INTERNAL MEDICINE

## 2023-11-14 PROCEDURE — 94760 N-INVAS EAR/PLS OXIMETRY 1: CPT

## 2023-11-14 PROCEDURE — 25810000003 SODIUM CHLORIDE 0.9 % SOLUTION: Performed by: STUDENT IN AN ORGANIZED HEALTH CARE EDUCATION/TRAINING PROGRAM

## 2023-11-14 PROCEDURE — 94761 N-INVAS EAR/PLS OXIMETRY MLT: CPT

## 2023-11-14 RX ORDER — SODIUM PHOSPHATE IN 0.9 % NACL 15MMOL/100
15 PLASTIC BAG, INJECTION (ML) INTRAVENOUS ONCE
Qty: 250 ML | Refills: 0 | Status: COMPLETED | OUTPATIENT
Start: 2023-11-14 | End: 2023-11-14

## 2023-11-14 RX ADMIN — IPRATROPIUM BROMIDE AND ALBUTEROL SULFATE 3 ML: 2.5; .5 SOLUTION RESPIRATORY (INHALATION) at 07:18

## 2023-11-14 RX ADMIN — Medication 3 MG: at 20:33

## 2023-11-14 RX ADMIN — IPRATROPIUM BROMIDE AND ALBUTEROL SULFATE 3 ML: 2.5; .5 SOLUTION RESPIRATORY (INHALATION) at 14:46

## 2023-11-14 RX ADMIN — SODIUM PHOSPHATE, MONOBASIC, MONOHYDRATE AND SODIUM PHOSPHATE, DIBASIC, ANHYDROUS 15 MMOL: 276; 142 INJECTION, SOLUTION INTRAVENOUS at 11:21

## 2023-11-14 RX ADMIN — Medication 250 MG: at 20:33

## 2023-11-14 RX ADMIN — SERTRALINE 100 MG: 100 TABLET, FILM COATED ORAL at 08:32

## 2023-11-14 RX ADMIN — FAMOTIDINE 20 MG: 20 TABLET ORAL at 08:31

## 2023-11-14 RX ADMIN — METHYLPREDNISOLONE SODIUM SUCCINATE 40 MG: 40 INJECTION, POWDER, LYOPHILIZED, FOR SOLUTION INTRAMUSCULAR; INTRAVENOUS at 05:47

## 2023-11-14 RX ADMIN — APIXABAN 5 MG: 5 TABLET, FILM COATED ORAL at 20:33

## 2023-11-14 RX ADMIN — Medication 10 ML: at 20:33

## 2023-11-14 RX ADMIN — IPRATROPIUM BROMIDE AND ALBUTEROL SULFATE 3 ML: 2.5; .5 SOLUTION RESPIRATORY (INHALATION) at 23:06

## 2023-11-14 RX ADMIN — FUROSEMIDE 10 MG: 20 TABLET ORAL at 08:32

## 2023-11-14 RX ADMIN — LATANOPROST 1 DROP: 50 SOLUTION/ DROPS OPHTHALMIC at 20:33

## 2023-11-14 RX ADMIN — APIXABAN 5 MG: 5 TABLET, FILM COATED ORAL at 08:31

## 2023-11-14 RX ADMIN — HYDROCODONE BITARTRATE AND HOMATROPINE METHYLBROMIDE 5 ML: 1.5; 5 SYRUP ORAL at 11:24

## 2023-11-14 RX ADMIN — Medication 250 MG: at 08:31

## 2023-11-14 RX ADMIN — METOPROLOL SUCCINATE 25 MG: 25 TABLET, EXTENDED RELEASE ORAL at 08:32

## 2023-11-14 RX ADMIN — METHYLPREDNISOLONE SODIUM SUCCINATE 40 MG: 40 INJECTION, POWDER, LYOPHILIZED, FOR SOLUTION INTRAMUSCULAR; INTRAVENOUS at 16:30

## 2023-11-14 NOTE — PROGRESS NOTES
LOS: 11 days   Patient Care Team:  Kristi Moreau APRN as PCP - General (Nurse Practitioner)  Gerard Foreman MD as Referring Physician (Medical Oncology)  Darrell Reinoso MD as Consulting Physician (Hematology and Oncology)  Albin Barriga MD as Cardiologist (Cardiology)  Richard Aldana, RN as     Chief Complaint: Reconsulted for chest pain.    Interval History: Patient was sleeping soundly, although she evidently had chest pain while ambulating to the bathroom.  Her heart rate was also elevated.  EKG showed sinus tachycardia with frequent ectopy but no obvious ischemic changes.  Her initial high-sensitivity troponin was 35, and then 27 (which are consistent with her prior troponin readings).    Vital Signs:  Temp:  [98 °F (36.7 °C)-98.7 °F (37.1 °C)] 98.4 °F (36.9 °C)  Heart Rate:  [] 104  Resp:  [16-26] 18  BP: (105-158)/(66-94) 105/66    Intake/Output Summary (Last 24 hours) at 11/13/2023 1926  Last data filed at 11/13/2023 1400  Gross per 24 hour   Intake 480 ml   Output 850 ml   Net -370 ml       Physical Exam:   General Appearance:    No acute distress, sleeping.   Lungs:     Clear to auscultation bilaterally     Heart:    Regular rhythm with ectopy and mildly tachycardic rate.  II/VI SM LLSB.   Abdomen:     Soft, nontender, nondistended.    Extremities:   No clubbing, cyanosis, or edema.     Results Review:    Results from last 7 days   Lab Units 11/13/23  0541   SODIUM mmol/L 140   POTASSIUM mmol/L 3.1*   CHLORIDE mmol/L 100   CO2 mmol/L 28.5   BUN mg/dL 13   CREATININE mg/dL 0.78   GLUCOSE mg/dL 103*   CALCIUM mg/dL 9.1     Results from last 7 days   Lab Units 11/13/23  1320 11/13/23  1013 11/09/23  0214   HSTROP T ng/L 27* 35* 32*     Results from last 7 days   Lab Units 11/13/23  0541   WBC 10*3/mm3 59.77*   HEMOGLOBIN g/dL 7.8*   HEMATOCRIT % 24.7*   PLATELETS 10*3/mm3 201             Results from last 7 days   Lab Units 11/11/23  0702   MAGNESIUM mg/dL 1.6           I reviewed the  patient's new clinical results.        Assessment:  1.  Acute cystitis (ESBL)  2.  Acute kidney injury, resolved with IV fluids  3.  Myeloproliferative disorder with significant leukocytosis  4.  Anemia, likely multifactorial  5.  Coronary artery disease, status post LEFTY to the LAD and diagonal in February 2022.  99% in-stent restenosis of the diagonal stent with placement of a new stent on 9/2/2022 at [facility].  Also found to have 60% proximal to mid LAD disease and 100% occlusion of the mid RCA with left-to-right collaterals at that time.  6.  Chronic diastolic CHF  7.  Mild aortic stenosis  8.  Suspected pulmonary hypertension  9.  Paroxysmal atrial fibrillation  10.  History of TIA  11.  Lung mass, indeterminant    Plan:  -She had chest pain with an elevated heart rate earlier today.  I do not see ischemic EKG changes, and her high-sensitivity troponin does not appear to be consistent with ACS.  In fact, it is very similar to her previous high-sensitivity troponin values.    -Given her age and multiple comorbidities, I would not pursue a repeat heart catheterization or ischemic evaluation unless absolutely necessary.      -She has had issues with recurrent anemia, and her Plavix appears to still be held since admission because of this.  She is back on Eliquis 5 mg twice daily.  At this point, I would simply add back an aspirin at 81 mg/day.  It does not appear that she is losing blood, although the anemia is most likely multifactorial given her blood disorder.    -Continue Toprol-XL at 25 mg/day.  Her heart rate is still mildly elevated, although she appears to be in sinus tachycardia with ectopy rather than atrial fibrillation.    -Volume status appears to be stable on the minimal Lasix dose.    Braden Frausto MD  11/13/23  19:26 EST

## 2023-11-14 NOTE — THERAPY TREATMENT NOTE
Patient Name: Catherine Boyer  : 1940    MRN: [id number]                              Today's Date: 2023       Admit Date: 2023    Visit Dx:     ICD-10-CM ICD-9-CM   1. Acute renal failure, unspecified acute renal failure type  N17.9 584.9   2. Acute abdominal pain  R10.9 789.00     338.19   3. Nausea vomiting and diarrhea  R11.2 787.91    R19.7 787.01   4. Pulmonary infiltrate  R91.8 793.19     Patient Active Problem List   Diagnosis    Acute on chronic diastolic heart failure    CAD (coronary artery disease)    Nonbacterial thrombotic endocarditis    Paroxysmal atrial fibrillation    Myeloproliferative disorder    Microcytic anemia    Type 2 diabetes mellitus with circulatory disorder, without long-term current use of insulin    GERD (gastroesophageal reflux disease)    Hypertension    Personal history of transient ischemic attack (TIA), 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 deficits    Porcelain gallbladder    Breast cancer    Atherosclerosis of abdominal aorta    APC (atrial premature contractions)    Moderate malnutrition    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    Elevated troponin    TIA (transient ischemic attack)    PUD (peptic ulcer disease)    Anemia    Tachycardia    Myelofibrosis    Normocytic anemia    Acute kidney injury    Pulmonary infiltrate    Iatrogenic pneumothorax     Past Medical History:   Diagnosis Date    Atherosclerosis of abdominal aorta 2023    Atrial fibrillation     Breast cancer     CAD (coronary artery disease)     NSTEMI 2022: 90% ostial LAD, 99% D1, 70% mid-distal LAD (medical therapy). She received two stents (2.5x18 and 2.5x26mm Finesse LEFTY) but I don't know which one went to which lesion.    Carotid atherosclerosis     Cholecystitis 2022    Added automatically from request for surgery [id number]    Chronic diastolic (congestive) heart failure     COVID 10/29/2022    GERD (gastroesophageal reflux disease)     Glaucoma     History of cataract     Hypertension      Microcytic anemia     per Dr. oleg pate office  note 6/30/22-dd    Myeloproliferative disorder     JAK2 positive    Nonbacterial thrombotic endocarditis     6/2021: 4x5mm vegetation on the ventricular surface of the anterior MV, negative blood cultures    Porcelain gallbladder     PUD (peptic ulcer disease)     TIA (transient ischemic attack)     Type 2 diabetes mellitus     Type 2 diabetes mellitus with circulatory disorder, without long-term current use of insulin 07/14/2022    Upper GI bleed      Past Surgical History:   Procedure Laterality Date    BREAST LUMPECTOMY  1999    BRONCHOSCOPY Bilateral 11/8/2023    Procedure: BRONCHOSCOPY UNDER FLUORO WITH BAL,  BIOPSIES; ACETYLCYSTEIN 4ML GIVEN;  Surgeon: Raoul Alford MD;  Location: [facility] ENDOSCOPY;  Service: Pulmonary;  Laterality: Bilateral;  PRE- PULMONARY INFILTRATES  POST- SAME    CARDIAC CATHETERIZATION N/A 09/02/2022    Procedure: Coronary angiography;  Surgeon: Guero Verde MD;  Location: [facility] CATH INVASIVE LOCATION;  Service: Cardiovascular;  Laterality: N/A;    CARDIAC CATHETERIZATION N/A 09/02/2022    Procedure: Stent LEFTY coronary;  Surgeon: Guero Verde MD;  Location: [facility] CATH INVASIVE LOCATION;  Service: Cardiovascular;  Laterality: N/A;    CATARACT EXTRACTION  2011    CHOLECYSTECTOMY      CHOLECYSTECTOMY WITH INTRAOPERATIVE CHOLANGIOGRAM N/A 11/28/2022    Procedure: CHOLECYSTECTOMY LAPAROSCOPIC INTRAOPERATIVE CHOLANGIOGRAM;  Surgeon: Dani Grover Jr., MD;  Location: [facility] MAIN OR;  Service: General;  Laterality: N/A;    COLONOSCOPY N/A 02/09/2023    Procedure: COLONOSCOPY TO CECUM & T.I.;  Surgeon: Guero Ferris MD;  Location: [facility] ENDOSCOPY;  Service: Gastroenterology;  Laterality: N/A;  PRE- MELENA, GI BLEED  POST- DIVERTICULOSIS, INT HEMORRHOIDS    ENDOSCOPY N/A 01/25/2023    Procedure: ESOPHAGOGASTRODUODENOSCOPY WITH BIOPSIES;  Surgeon: Charles Diaz MD;  Location: [facility] ENDOSCOPY;  Service: Gastroenterology;   Laterality: N/A;  pre: abd pain, nausea  post: hiatal hernia, mild gastritis, sloughing of the esophagus    ENTEROSCOPY SMALL BOWEL N/A 02/09/2023    Procedure: ENTEROSCOPY SMALL BOWEL;  Surgeon: Guero Ferris MD;  Location: [facility] ENDOSCOPY;  Service: Gastroenterology;  Laterality: N/A;  PRE- MELENA, GI BLEED  POST- HIATAL HERNIA    JOINT REPLACEMENT      UPPER GASTROINTESTINAL ENDOSCOPY        General Information       Row Name 11/14/23 1428          Physical Therapy Time and Intention    Document Type therapy note (daily note)  -     Mode of Treatment physical therapy  -       Row Name 11/14/23 1428          General Information    Existing Precautions/Restrictions fall;oxygen therapy device and L/min  -       Row Name 11/14/23 1428          Cognition    Orientation Status (Cognition) oriented x 4  -               User Key  (r) = Recorded By, (t) = Taken By, (c) = Cosigned By      Initials Name Provider Type    Sarah Donovan PTA Physical Therapist Assistant                   Mobility       Row Name 11/14/23 1430          Bed Mobility    Comment, (Bed Mobility) pt declined OOB mobility, though agreed to bed exercises  -               User Key  (r) = Recorded By, (t) = Taken By, (c) = Cosigned By      Initials Name Provider Type    Sarah Donovan PTA Physical Therapist Assistant                   Obj/Interventions       Row Name 11/14/23 1431          Motor Skills    Therapeutic Exercise --  supine AP, QS, GS, heel slides, hip abd/add, SAQ, SLR x10 reps  -               User Key  (r) = Recorded By, (t) = Taken By, (c) = Cosigned By      Initials Name Provider Type    Sarah Donovan PTA Physical Therapist Assistant                   Goals/Plan    No documentation.                  Clinical Impression       Row Name 11/14/23 1432          Pain    Pretreatment Pain Rating 0/10 - no pain  -     Posttreatment Pain Rating 0/10 - no pain  -       Row Name 11/14/23 1432           Positioning and Restraints    Pre-Treatment Position in bed  -SM     Post Treatment Position bed  -SM     In Bed supine;call light within reach;encouraged to call for assist;exit alarm on;notified nsg  -               User Key  (r) = Recorded By, (t) = Taken By, (c) = Cosigned By      Initials Name Provider Type    Sarah Donovan PTA Physical Therapist Assistant                   Outcome Measures       Row Name 11/14/23 1435          How much help from another person do you currently need...    Turning from your back to your side while in flat bed without using bedrails? 3  -SM     Moving from lying on back to sitting on the side of a flat bed without bedrails? 3  -SM     Moving to and from a bed to a chair (including a wheelchair)? 3  -SM     Standing up from a chair using your arms (e.g., wheelchair, bedside chair)? 3  -SM     Climbing 3-5 steps with a railing? 1  -SM     To walk in hospital room? 3  -SM     AM-PAC 6 Clicks Score (PT) 16  -SM     Highest Level of Mobility Goal 5 --> Static standing  -       Row Name 11/14/23 1435          Functional Assessment    Outcome Measure Options AM-PAC 6 Clicks Basic Mobility (PT)  -               User Key  (r) = Recorded By, (t) = Taken By, (c) = Cosigned By      Initials Name Provider Type    Sarah Donovan PTA Physical Therapist Assistant                                 Physical Therapy Education       Title: PT OT SLP Therapies (Done)       Topic: Physical Therapy (Done)       Point: Mobility training (Done)       Learning Progress Summary             Patient Acceptance, E,TB,D, VU,NR by SM at 11/10/2023 1511    Acceptance, E, VU by ML at 11/10/2023 0407    Acceptance, E, VU by ML at 11/9/2023 0432    Acceptance, E, VU by ML at 11/8/2023 0449    Acceptance, E, VU by ML at 11/7/2023 0517    Acceptance, E, VU by ML at 11/6/2023 0459    Acceptance, E, VU by SK at 11/3/2023 1142   Family Acceptance, E, VU by SK at 11/3/2023 1142                          Point: Home exercise program (Done)       Learning Progress Summary             Patient Acceptance, E,TB,D, VU,NR by  at 11/14/2023 1435    Acceptance, E,TB,D, VU,NR by  at 11/10/2023 1511    Acceptance, E, VU by ML at 11/10/2023 0407    Acceptance, E, VU by ML at 11/9/2023 0432    Acceptance, E, VU by ML at 11/8/2023 0449    Acceptance, E, VU by ML at 11/7/2023 0517    Acceptance, E, VU by ML at 11/6/2023 0459                         Point: Body mechanics (Done)       Learning Progress Summary             Patient Acceptance, E,TB,D, VU,NR by  at 11/10/2023 1511    Acceptance, E, VU by ML at 11/10/2023 0407    Acceptance, E, VU by ML at 11/9/2023 0432    Acceptance, E, VU by ML at 11/8/2023 0449    Acceptance, E, VU by ML at 11/7/2023 0517    Acceptance, E, VU by ML at 11/6/2023 0459    Acceptance, E, VU by SK at 11/3/2023 1142   Family Acceptance, E, VU by SK at 11/3/2023 1142                         Point: Precautions (Done)       Learning Progress Summary             Patient Acceptance, E,TB,D, VU,NR by  at 11/10/2023 1511    Acceptance, E, VU by ML at 11/10/2023 0407    Acceptance, E, VU by ML at 11/9/2023 0432    Acceptance, E, VU by ML at 11/8/2023 0449    Acceptance, E, VU by ML at 11/7/2023 0517    Acceptance, E, VU by ML at 11/6/2023 0459    Acceptance, E, VU by SK at 11/3/2023 1142   Family Acceptance, E, VU by SK at 11/3/2023 1142                                         User Key       Initials Effective Dates Name Provider Type Discipline     03/07/18 -  Sarah Su, PTA Physical Therapist Assistant PT    ML 06/15/23 -  Isatu Johnson RN Registered Nurse Nurse    SK 10/10/23 -  Nessa Barajas, OZIEL Student PT Student PT                  PT Recommendation and Plan     Plan of Care Reviewed With: patient  Progress: improving  Outcome Evaluation: Pt tolerated treatment fair this date. Pt declined OOB mobility d/t recently being up to the chair and on the BSC a few times today,  though pt agreed to bed exercises. Pt was instructed on several supine LE exercises, and encouraged pt to attempt a few on own later.     Time Calculation:         PT Charges       Row Name 11/14/23 1437             Time Calculation    Start Time 1355  -      Stop Time 1412  -      Time Calculation (min) 17 min  -      PT Received On 11/14/23  -      PT - Next Appointment 11/15/23  -                User Key  (r) = Recorded By, (t) = Taken By, (c) = Cosigned By      Initials Name Provider Type    Sarah Donovan PTA Physical Therapist Assistant                  Therapy Charges for Today       Code Description Service Date Service Provider Modifiers Qty    [id number] HC PT THER PROC EA 15 MIN 11/14/2023 Sarah Su PTA GP 1            PT G-Codes  Outcome Measure Options: AM-PAC 6 Clicks Basic Mobility (PT)  AM-PAC 6 Clicks Score (PT): 16  AM-PAC 6 Clicks Score (OT): 14  PT Discharge Summary  Anticipated Discharge Disposition (PT): assisted living, skilled nursing facility    Sarah Su PTA  11/14/2023

## 2023-11-14 NOTE — PROGRESS NOTES
Name: Catherine Boyer ADMIT: 2023   : 1940  PCP: Kristi Moreau APRN    MRN: [id number] LOS: 12 days   AGE/SEX: 83 y.o. female  ROOM: [facility]     Subjective   Subjective   Resting in bed on her side. Brother at bedside, she is agreeable to getting out of bed for lunch/sitting by the window. Otherwise hopeful to get to rehab in the next couple days.       Objective   Objective   Vital Signs  Temp:  [97.7 °F (36.5 °C)-98.4 °F (36.9 °C)] 98.4 °F (36.9 °C)  Heart Rate:  [] 99  Resp:  [18-20] 18  BP: (105-123)/(59-86) 108/59  SpO2:  [87 %-97 %] 92 %  on  Flow (L/min):  [3] 3;   Device (Oxygen Therapy): nasal cannula  Body mass index is 24.08 kg/m².  Physical Exam  Vitals and nursing note reviewed.   Constitutional:       General: She is not in acute distress.     Comments: Elderly, chronically ill-appearing   Cardiovascular:      Rate and Rhythm: Normal rate and regular rhythm.      Pulses: Normal pulses.      Heart sounds: Normal heart sounds.   Pulmonary:      Effort: Pulmonary effort is normal. No respiratory distress.      Breath sounds: Normal breath sounds. No wheezing.   Abdominal:      General: Bowel sounds are normal.      Palpations: Abdomen is soft.      Tenderness: There is no abdominal tenderness.   Skin:     General: Skin is warm and dry.      Coloration: Skin is pale.   Neurological:      Mental Status: She is alert.       Results Review     I reviewed the patient's new clinical results.  Results from last 7 days   Lab Units 23  062341 2328 23  0702   WBC 10*3/mm3 72.56* 59.77* 59.91* 64.69*   HEMOGLOBIN g/dL 8.4* 7.8* 8.3* 8.2*   PLATELETS 10*3/mm3 203 201 224 202     Results from last 7 days   Lab Units 23  0620 236 23  0541 23  0728 23  0702   SODIUM mmol/L 139  --  140 140 141   POTASSIUM mmol/L 4.7 4.9 3.1* 3.8 3.8   CHLORIDE mmol/L 100  --  100 102 105   CO2 mmol/L 26.8  --  28.5 28.0 25.0   BUN mg/dL 13  --   "13 13 14   CREATININE mg/dL 0.89  --  0.78 0.81 0.72   GLUCOSE mg/dL 92  --  103* 104* 103*   EGFR mL/min/1.73 63.6  --  74.5 71.2 82.1     Results from last 7 days   Lab Units 11/14/23  0620 11/13/23  0541 11/12/23  0728 11/11/23  0702   ALBUMIN g/dL 3.6 3.5 4.1 3.8     Results from last 7 days   Lab Units 11/14/23  0620 11/13/23  0541 11/12/23  0728 11/11/23  0702 11/09/23  0214 11/09/23  0008   CALCIUM mg/dL 9.1 9.1 9.5 9.3   < >  --    ALBUMIN g/dL 3.6 3.5 4.1 3.8   < >  --    MAGNESIUM mg/dL  --   --   --  1.6  --  1.6   PHOSPHORUS mg/dL 2.2* 2.6 2.9 2.6   < >  --     < > = values in this interval not displayed.           No results found for: \"HGBA1C\", \"POCGLU\"    No radiology results for the last day    I have personally reviewed all medications:  Scheduled Medications  apixaban, 5 mg, Oral, Q12H  famotidine, 20 mg, Oral, Daily  furosemide, 10 mg, Oral, Daily  ipratropium-albuterol, 3 mL, Nebulization, Q8H - RT  latanoprost, 1 drop, Both Eyes, Nightly  lidocaine, 20 mL, Infiltration, Once  methylPREDNISolone sodium succinate, 40 mg, Intravenous, Q12H  metoprolol succinate XL, 25 mg, Oral, Daily  Momelotinib Dihydrochloride, 200 mg, Oral, Daily  saccharomyces boulardii, 250 mg, Oral, BID  senna-docusate sodium, 2 tablet, Oral, BID  sertraline, 100 mg, Oral, Daily  sodium phosphate, 15 mmol, Intravenous, Once    Infusions   Diet  Diet: Cardiac Diets, Diabetic Diets; Healthy Heart (2-3 Na+); Consistent Carbohydrate; Texture: Regular Texture (IDDSI 7); Fluid Consistency: Thin (IDDSI 0)    I have personally reviewed:  [x]  Laboratory   []  Microbiology   []  Radiology   [x]  EKG/Telemetry  []  Cardiology/Vascular   []  Pathology    []  Records       Assessment/Plan     Active Hospital Problems    Diagnosis  POA    **Acute kidney injury [N17.9]  Yes    Iatrogenic pneumothorax [J95.811]  No    Pulmonary infiltrate [R91.8]  Unknown    Myelofibrosis [D75.81]  Yes    Personal history of transient ischemic attack (TIA), " 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       83 y.o. female admitted with Acute kidney injury.    Acute Cystitis without Hematuria  - Urine culture grew ESBL.  - Completed ertapenem     KARLI  - Resolved with IVF  - Order repeat BMP in AM for reassessment.  - Appreciate nephrology recs- lasix restarted     Lung mass  - Indeterminate, lingular on imaging. s/p bronchoscopy 11/8/23 with biopsies, pathology showing no evidence of malignancy but only 3 biopsies (as opposed to 6-10) were obtained due to bleeding  - has small iatrogenic pneumothorax, improved with conservative treatment  - follow up chest CT scan without contrast in 6-8 weeks  - appreciate pulmonology recs, they have signed off-follow up with ABEL Crabtree for Dr. Billings, in 8 weeks     Aortic Stenosis/Chronic Diastolic CHF  Coronary artery disease  Paroxysmal atrial fibrillation  Hypertension  - Continue Eliquis, plavix held on admission however given her issues with anemia, cardiology rec resuming ASA 81mg  - continue metoprolol 25, some degree of tachycardia expected given acute issues; HR is overall improving  - continue lasix     Myeloproliferative disorder/Anemia  Functional iron deficiency  Leukocytosis  - Has chronic leukocytosis, values elevated on most recent labs, however, relatively stable from prior.  - s/p 1 unit of PRBCs 11/6/23 and 11/7/23 with good response; also received ferrelicit  - oncology evaluated, o/p f/u planned- signed off  - daily cbc    SCDs for DVT prophylaxis.  Full code.  Discussed with patient, family, and nursing staff.  Anticipate discharge to SNU  facility once arrangements have been made.      Chandni Luo MD  [facility]  11/14/23

## 2023-11-14 NOTE — PROGRESS NOTES
"    Patient Name: Catherine Boyer  :1940  83 y.o.      Patient Care Team:  Kristi Moreau APRN as PCP - General (Nurse Practitioner)  Gerard Foreman MD as Referring Physician (Medical Oncology)  Darrell Reinoso MD as Consulting Physician (Hematology and Oncology)  Albin Barriga MD as Cardiologist (Cardiology)  Richard Aldana, RN as     Chief Complaint: follow up chest pain    Interval History: she says today has been her best day. She walked to the bathroom and around the bed to the chair. She as slow but felt like she did well. HR is well controlled. She is in SR. She has not had any more chest pain.        Objective   Vital Signs  Temp:  [97.7 °F (36.5 °C)-98.4 °F (36.9 °C)] 98.4 °F (36.9 °C)  Heart Rate:  [] 99  Resp:  [18] 18  BP: (108-123)/(59-86) 108/59    Intake/Output Summary (Last 24 hours) at 2023 1322  Last data filed at 2023 1100  Gross per 24 hour   Intake 360 ml   Output 350 ml   Net 10 ml     Flowsheet Rows      Flowsheet Row First Filed Value   Admission Height 162 cm (63.78\") Documented at 11/10/2023 0847   Admission Weight 63 kg (138 lb 14.2 oz) Documented at 2023 0556            Physical Exam:   General Appearance:    Alert, cooperative, in no acute distress   Lungs:     Clear to auscultation.  Normal respiratory effort and rate.      Heart:    Regular rhythm and normal rate, normal S1 and S2, no murmurs, gallops or rubs.     Chest Wall:    No abnormalities observed   Abdomen:     Soft, nontender, positive bowel sounds.     Extremities:   no cyanosis, clubbing or edema.  No marked joint deformities.  Adequate musculoskeletal strength.       Results Review:    Results from last 7 days   Lab Units 23  0620   SODIUM mmol/L 139   POTASSIUM mmol/L 4.7   CHLORIDE mmol/L 100   CO2 mmol/L 26.8   BUN mg/dL 13   CREATININE mg/dL 0.89   GLUCOSE mg/dL 92   CALCIUM mg/dL 9.1     Results from last 7 days   Lab Units 23  1320 23  1013 23  0214 "   HSTROP T ng/L 27* 35* 32*     Results from last 7 days   Lab Units 11/14/23  0620   WBC 10*3/mm3 72.56*   HEMOGLOBIN g/dL 8.4*   HEMATOCRIT % 26.9*   PLATELETS 10*3/mm3 203         Results from last 7 days   Lab Units 11/11/23  0702   MAGNESIUM mg/dL 1.6                   Medication Review:   apixaban, 5 mg, Oral, Q12H  famotidine, 20 mg, Oral, Daily  furosemide, 10 mg, Oral, Daily  ipratropium-albuterol, 3 mL, Nebulization, Q8H - RT  latanoprost, 1 drop, Both Eyes, Nightly  lidocaine, 20 mL, Infiltration, Once  methylPREDNISolone sodium succinate, 40 mg, Intravenous, Q12H  metoprolol succinate XL, 25 mg, Oral, Daily  Momelotinib Dihydrochloride, 200 mg, Oral, Daily  saccharomyces boulardii, 250 mg, Oral, BID  senna-docusate sodium, 2 tablet, Oral, BID  sertraline, 100 mg, Oral, Daily  sodium phosphate, 15 mmol, Intravenous, Once              Assessment & Plan   Acute cystitis (ESBL)   Acute kidney injury, resolved with IVF  Myeloproliferative disorder with significant leukpcytosis  Anemia, likely multifactorial  Coronary artery disease status post drug eluting stent to the LAD and diagonal branch in February 2022. 99% in stent restenosis of the diagonal stent in September 2022 treated with repeat stenting. Has 60% prox LAD stenosis as well as occlusion of the mid RCA with collaterals.   Chronic diastolic heart failure  Mild aortic valve stenosis, mild to moderate mitral valve regurgitation  Pulmonary hypertension RVSP 62 mmHg on transthoracic echo.   Paroxysmal atrial fibrillation - on apixaban. Weight is nearing lower limit of 5 mg dosing. This will need to be watched closely. If she goes below 60kg will need to be decreased. HR borderline tachy earlier but currently stable. SR.     She has not had any more chest pain. Trop and EKG reassuring.   Nothing further to add from cardiac standpoint. Will see as needed. Keep appt with ABEL Urbina 12/4/23.    ABEL Carbajal  [facility]  11/14/23  13:22 EST

## 2023-11-14 NOTE — PROGRESS NOTES
LOS: 12 days     Chief Complaint/ Reason for encounter: KARLI    Subjective   11/06/23 : Patient resting with family at bedside able provide some history  She is doing well overall no new complaints  Weight up today but she is not edematous, not short of breath  She is on 3 L oxygen which is near her home rate  Eating some, drinking well according to family    11/7: No new complaints, bronchoscopy with biopsy scheduled for today  Oral intake remains very low, no shortness of breath chest pain or edema    11/8: Events noted, patient developed a right pneumothorax after her bronchoscopy today.  Higher than normal bleeding during the procedure noted, CT-guided chest tube fortunately not needed  She has been intermittently hypotensive, currently 89/48, O2 requirements about the same, 3 L    11/9: She doing a little better today, fortunately did not need a chest tube  Oxygen requirements near baseline of around 3 L    11/10 family room, says her appetite is low and that she is tired, bp near goal     11/11: Resting, no new complaints or events, no shortness of breath or chest pain overnight  Good appetite    11/12: She looks and feels well denies complaints, still with very poor appetite no dyspnea    11/13: Oral intake a little better per patient family and nursing, no nausea vomiting  No shortness of breath chest pain or edema    11/14: no acute events. Feeling ok. Denies sob or chest pain     Medical history reviewed:  History of Present Illness    Subjective    History taken from: Patient and chart    Vital Signs  Temp:  [97.7 °F (36.5 °C)-98.4 °F (36.9 °C)] 98.4 °F (36.9 °C)  Heart Rate:  [] 99  Resp:  [18-20] 18  BP: (105-123)/(59-86) 108/59       Wt Readings from Last 1 Encounters:   11/14/23 0600 63.2 kg (139 lb 5.3 oz)   11/13/23 0600 66.4 kg (146 lb 6.2 oz)   11/12/23 0509 66.7 kg (147 lb)   11/11/23 0545 67.1 kg (147 lb 14.9 oz)   11/10/23 0600 67.8 kg (149 lb 7.6 oz)   11/09/23 0600 67.7 kg (149 lb 4  "oz)   11/08/23 0428 67.7 kg (149 lb 4 oz)   11/07/23 0600 67.6 kg (149 lb 0.5 oz)   11/06/23 0605 67.6 kg (149 lb 0.5 oz)   11/05/23 0507 62.6 kg (138 lb)   11/04/23 0539 62.6 kg (138 lb)   11/03/23 0556 63 kg (138 lb 14.2 oz)       Objective:  Vital signs: (most recent): Blood pressure 108/59, pulse 99, temperature 98.4 °F (36.9 °C), temperature source Oral, resp. rate 18, height 162 cm (63.78\"), weight 63.2 kg (139 lb 5.3 oz), SpO2 92%.                Objective:  General Appearance:  Comfortable, well-appearing, in no acute distress and not in pain.  Awake, alert, oriented. Eating lunch   HEENT: Mucous membranes moist, no injury, oropharynx clear  Lungs:  Normal effort and normal respiratory rate.  Breath sounds clear to auscultation.  No  respiratory distress.  No rales, decreased breath sounds or rhonchi.    Heart: Normal rate.  Regular rhythm.  S1, S2 normal.  No murmur.   Abdomen: Abdomen is soft.  Bowel sounds are normal, no abdominal tenderness.  There is no rebound or guarding  Extremities: no edema of bilateral lower extremities  Neurological: No focal motor or sensory deficits, pupils reactive  Skin:  Warm and dry.  No rash or cyanosis.       Results Review:    Intake/Output:     Intake/Output Summary (Last 24 hours) at 11/14/2023 1005  Last data filed at 11/13/2023 1400  Gross per 24 hour   Intake 120 ml   Output 150 ml   Net -30 ml         DATA:  Radiology and Labs:  The following labs independently reviewed by me. Additional labs ordered for tomorrow a.m.  Interval notes, chart personally reviewed by me.   Old records independently reviewed showing normal baseline creatinine, history of CHF  Discussed with patient's family at bedside    Risk/ complexity of medical care/ medical decision making moderate risk, KARLI, fluid and electrolyte management    Labs:   Recent Results (from the past 24 hour(s))   High Sensitivity Troponin T    Collection Time: 11/13/23 10:13 AM    Specimen: Blood   Result Value Ref " Range    HS Troponin T 35 (H) <14 ng/L   High Sensitivity Troponin T 2Hr    Collection Time: 11/13/23  1:20 PM    Specimen: Blood   Result Value Ref Range    HS Troponin T 27 (H) <14 ng/L    Troponin T Delta -8 (L) >=-4 - <+4 ng/L   Potassium    Collection Time: 11/13/23  8:46 PM    Specimen: Blood   Result Value Ref Range    Potassium 4.9 3.5 - 5.2 mmol/L   Renal Function Panel    Collection Time: 11/14/23  6:20 AM    Specimen: Blood   Result Value Ref Range    Glucose 92 65 - 99 mg/dL    BUN 13 8 - 23 mg/dL    Creatinine 0.89 0.57 - 1.00 mg/dL    Sodium 139 136 - 145 mmol/L    Potassium 4.7 3.5 - 5.2 mmol/L    Chloride 100 98 - 107 mmol/L    CO2 26.8 22.0 - 29.0 mmol/L    Calcium 9.1 8.6 - 10.5 mg/dL    Albumin 3.6 3.5 - 5.2 g/dL    Phosphorus 2.2 (L) 2.5 - 4.5 mg/dL    Anion Gap 12.2 5.0 - 15.0 mmol/L    BUN/Creatinine Ratio 14.6 7.0 - 25.0    eGFR 63.6 >60.0 mL/min/1.73   CBC Auto Differential    Collection Time: 11/14/23  6:20 AM    Specimen: Blood   Result Value Ref Range    WBC 72.56 (C) 3.40 - 10.80 10*3/mm3    RBC 2.90 (L) 3.77 - 5.28 10*6/mm3    Hemoglobin 8.4 (L) 12.0 - 15.9 g/dL    Hematocrit 26.9 (L) 34.0 - 46.6 %    MCV 92.8 79.0 - 97.0 fL    MCH 29.0 26.6 - 33.0 pg    MCHC 31.2 (L) 31.5 - 35.7 g/dL    RDW 16.4 (H) 12.3 - 15.4 %    RDW-SD 53.4 37.0 - 54.0 fl    MPV 11.8 6.0 - 12.0 fL    Platelets 203 140 - 450 10*3/mm3    nRBC 0.7 (H) 0.0 - 0.2 /100 WBC   Manual Differential    Collection Time: 11/14/23  6:20 AM    Specimen: Blood   Result Value Ref Range    Neutrophil % 86.0 (H) 42.7 - 76.0 %    Lymphocyte % 3.0 (L) 19.6 - 45.3 %    Monocyte % 4.0 (L) 5.0 - 12.0 %    Eosinophil % 2.0 0.3 - 6.2 %    Metamyelocyte % 2.0 (H) 0.0 - 0.0 %    Myelocyte % 2.0 (H) 0.0 - 0.0 %    Atypical Lymphocyte % 1.0 0.0 - 5.0 %    Neutrophils Absolute 62.40 (H) 1.70 - 7.00 10*3/mm3    Lymphocytes Absolute 2.90 0.70 - 3.10 10*3/mm3    Monocytes Absolute 2.90 (H) 0.10 - 0.90 10*3/mm3    Eosinophils Absolute 1.45 (H) 0.00 -  0.40 10*3/mm3    RBC Morphology Normal Normal    WBC Morphology Normal Normal    Platelet Morphology Normal Normal       Radiology:  Pertinent radiology studies were reviewed as described above      Medications have been reviewed separately in chart overview    Narrative & Impression   Portable chest radiograph     HISTORY: Pneumothorax     TECHNIQUE: Single AP portable radiograph of the chest     COMPARISON: Chest radiograph 11/8/2023     IMPRESSION:  FINDINGS AND IMPRESSION:  Previously seen right pneumothorax has decreased in size resulting in  approximately 1.6 cm in pleural-parenchymal separation (previously 2.8  cm). There is moderate pulmonary opacification throughout bilateral  lungs, right greater than left, suggestive of multifocal pneumonia  and/or pulmonary edema in the appropriate clinical context and  correlation with patient history is recommended with follow-up chest CT  if clinically indicated. Given the somewhat masslike opacification  overlying the right lung, at least continued attention on follow-up  chest radiograph in 4 to 6 weeks is recommended to ensure appropriate  evolution/resolution and exclude any possibility of neoplasm.            ASSESSMENT:  Acute kidney injury, prerenal, rapidly improving and near baseline  Chronic diastolic CHF - EF 55%   Nausea vomiting and dehydration, better  Left lingular lung mass  Myeloproliferative disorder, hematology now following  Chronic leukocytosis  Atrial fibrillation  Diarrhea, slowly resolving  Acute on chronic anemia  Diabetes mellitus  UTI on ertapenem, ESBL E. coli  Lung mass, biopsy 11/8  Postop pneumothorax, had improved by the time chest tube was to be placed      DISCUSSION/PLAN:   Renal function, volume and most electrolytes remain at goal  Oral intake seems to be improving  Continue current diuretic dose for now    Phos low, replace      Continue to monitor electrolytes and volume closely  Follow-up a.m. labs      Minerva Whaley,  MD  [facility]   Office phone number: [phone]  Answering service phone number: [phone]    11/14/23  10:05 EST

## 2023-11-14 NOTE — PLAN OF CARE
Goal Outcome Evaluation:  Plan of Care Reviewed With: patient        Progress: improving  Outcome Evaluation: Pt tolerated treatment fair this date. Pt declined OOB mobility d/t recently being up to the chair and on the BSC a few times today, though pt agreed to bed exercises. Pt was instructed on several supine LE exercises, and encouraged pt to attempt a few on own later.      Anticipated Discharge Disposition (PT): assisted living, skilled nursing facility

## 2023-11-15 LAB
ALBUMIN SERPL-MCNC: 3.9 G/DL (ref 3.5–5.2)
ANION GAP SERPL CALCULATED.3IONS-SCNC: 12.4 MMOL/L (ref 5–15)
BUN SERPL-MCNC: 18 MG/DL (ref 8–23)
BUN/CREAT SERPL: 22.8 (ref 7–25)
CALCIUM SPEC-SCNC: 9.2 MG/DL (ref 8.6–10.5)
CHLORIDE SERPL-SCNC: 97 MMOL/L (ref 98–107)
CO2 SERPL-SCNC: 29.6 MMOL/L (ref 22–29)
CREAT SERPL-MCNC: 0.79 MG/DL (ref 0.57–1)
DEPRECATED RDW RBC AUTO: 54.6 FL (ref 37–54)
EGFRCR SERPLBLD CKD-EPI 2021: 73.4 ML/MIN/1.73
ERYTHROCYTE [DISTWIDTH] IN BLOOD BY AUTOMATED COUNT: 16.8 % (ref 12.3–15.4)
GLUCOSE SERPL-MCNC: 127 MG/DL (ref 65–99)
HCT VFR BLD AUTO: 25.8 % (ref 34–46.6)
HGB BLD-MCNC: 8.1 G/DL (ref 12–15.9)
LYMPHOCYTES # BLD MANUAL: 0 10*3/MM3 (ref 0.7–3.1)
LYMPHOCYTES NFR BLD MANUAL: 11 % (ref 5–12)
MAGNESIUM SERPL-MCNC: 2.1 MG/DL (ref 1.6–2.4)
MCH RBC QN AUTO: 28.9 PG (ref 26.6–33)
MCHC RBC AUTO-ENTMCNC: 31.4 G/DL (ref 31.5–35.7)
MCV RBC AUTO: 92.1 FL (ref 79–97)
METAMYELOCYTES NFR BLD MANUAL: 1 % (ref 0–0)
MONOCYTES # BLD: 6.16 10*3/MM3 (ref 0.1–0.9)
MYELOCYTES NFR BLD MANUAL: 5 % (ref 0–0)
NEUTROPHILS # BLD AUTO: 46.46 10*3/MM3 (ref 1.7–7)
NEUTROPHILS NFR BLD MANUAL: 83 % (ref 42.7–76)
PHOSPHATE SERPL-MCNC: 3.7 MG/DL (ref 2.5–4.5)
PLAT MORPH BLD: NORMAL
PLATELET # BLD AUTO: 201 10*3/MM3 (ref 140–450)
PMV BLD AUTO: 11.8 FL (ref 6–12)
POTASSIUM SERPL-SCNC: 4.1 MMOL/L (ref 3.5–5.2)
POTASSIUM SERPL-SCNC: 5 MMOL/L (ref 3.5–5.2)
RBC # BLD AUTO: 2.8 10*6/MM3 (ref 3.77–5.28)
RBC MORPH BLD: NORMAL
SODIUM SERPL-SCNC: 139 MMOL/L (ref 136–145)
TROPONIN T SERPL HS-MCNC: 33 NG/L
VARIANT LYMPHS NFR BLD MANUAL: 0 % (ref 19.6–45.3)
WBC MORPH BLD: NORMAL
WBC NRBC COR # BLD: 55.98 10*3/MM3 (ref 3.4–10.8)

## 2023-11-15 PROCEDURE — 83735 ASSAY OF MAGNESIUM: CPT | Performed by: STUDENT IN AN ORGANIZED HEALTH CARE EDUCATION/TRAINING PROGRAM

## 2023-11-15 PROCEDURE — 93005 ELECTROCARDIOGRAM TRACING: CPT | Performed by: STUDENT IN AN ORGANIZED HEALTH CARE EDUCATION/TRAINING PROGRAM

## 2023-11-15 PROCEDURE — 94799 UNLISTED PULMONARY SVC/PX: CPT

## 2023-11-15 PROCEDURE — 93010 ELECTROCARDIOGRAM REPORT: CPT | Performed by: INTERNAL MEDICINE

## 2023-11-15 PROCEDURE — 94761 N-INVAS EAR/PLS OXIMETRY MLT: CPT

## 2023-11-15 PROCEDURE — 97535 SELF CARE MNGMENT TRAINING: CPT

## 2023-11-15 PROCEDURE — 94760 N-INVAS EAR/PLS OXIMETRY 1: CPT

## 2023-11-15 PROCEDURE — 84132 ASSAY OF SERUM POTASSIUM: CPT | Performed by: STUDENT IN AN ORGANIZED HEALTH CARE EDUCATION/TRAINING PROGRAM

## 2023-11-15 PROCEDURE — 80069 RENAL FUNCTION PANEL: CPT | Performed by: INTERNAL MEDICINE

## 2023-11-15 PROCEDURE — 25010000002 METHYLPREDNISOLONE PER 40 MG: Performed by: INTERNAL MEDICINE

## 2023-11-15 PROCEDURE — 84484 ASSAY OF TROPONIN QUANT: CPT | Performed by: STUDENT IN AN ORGANIZED HEALTH CARE EDUCATION/TRAINING PROGRAM

## 2023-11-15 PROCEDURE — 97116 GAIT TRAINING THERAPY: CPT

## 2023-11-15 PROCEDURE — 85025 COMPLETE CBC W/AUTO DIFF WBC: CPT | Performed by: INTERNAL MEDICINE

## 2023-11-15 PROCEDURE — 85007 BL SMEAR W/DIFF WBC COUNT: CPT | Performed by: INTERNAL MEDICINE

## 2023-11-15 PROCEDURE — 94664 DEMO&/EVAL PT USE INHALER: CPT

## 2023-11-15 RX ORDER — ASPIRIN 81 MG/1
81 TABLET ORAL DAILY
Status: DISCONTINUED | OUTPATIENT
Start: 2023-11-15 | End: 2023-11-22 | Stop reason: HOSPADM

## 2023-11-15 RX ADMIN — Medication 250 MG: at 10:18

## 2023-11-15 RX ADMIN — IPRATROPIUM BROMIDE AND ALBUTEROL SULFATE 3 ML: 2.5; .5 SOLUTION RESPIRATORY (INHALATION) at 19:50

## 2023-11-15 RX ADMIN — FUROSEMIDE 10 MG: 20 TABLET ORAL at 10:18

## 2023-11-15 RX ADMIN — HYDROCODONE BITARTRATE AND HOMATROPINE METHYLBROMIDE 5 ML: 1.5; 5 SYRUP ORAL at 10:23

## 2023-11-15 RX ADMIN — APIXABAN 5 MG: 5 TABLET, FILM COATED ORAL at 20:10

## 2023-11-15 RX ADMIN — HYDROCODONE BITARTRATE AND HOMATROPINE METHYLBROMIDE 5 ML: 1.5; 5 SYRUP ORAL at 20:12

## 2023-11-15 RX ADMIN — METOPROLOL SUCCINATE 25 MG: 25 TABLET, EXTENDED RELEASE ORAL at 10:18

## 2023-11-15 RX ADMIN — NITROGLYCERIN 0.4 MG: 0.4 TABLET SUBLINGUAL at 22:31

## 2023-11-15 RX ADMIN — LATANOPROST 1 DROP: 50 SOLUTION/ DROPS OPHTHALMIC at 21:33

## 2023-11-15 RX ADMIN — Medication 250 MG: at 20:10

## 2023-11-15 RX ADMIN — METHYLPREDNISOLONE SODIUM SUCCINATE 40 MG: 40 INJECTION, POWDER, LYOPHILIZED, FOR SOLUTION INTRAMUSCULAR; INTRAVENOUS at 18:54

## 2023-11-15 RX ADMIN — FAMOTIDINE 20 MG: 20 TABLET ORAL at 10:19

## 2023-11-15 RX ADMIN — HYDROCODONE BITARTRATE AND ACETAMINOPHEN 1 TABLET: 5; 325 TABLET ORAL at 20:12

## 2023-11-15 RX ADMIN — METHYLPREDNISOLONE SODIUM SUCCINATE 40 MG: 40 INJECTION, POWDER, LYOPHILIZED, FOR SOLUTION INTRAMUSCULAR; INTRAVENOUS at 05:02

## 2023-11-15 RX ADMIN — SERTRALINE 100 MG: 100 TABLET, FILM COATED ORAL at 10:18

## 2023-11-15 RX ADMIN — IPRATROPIUM BROMIDE AND ALBUTEROL SULFATE 3 ML: 2.5; .5 SOLUTION RESPIRATORY (INHALATION) at 14:49

## 2023-11-15 RX ADMIN — HYDROCODONE BITARTRATE AND ACETAMINOPHEN 1 TABLET: 5; 325 TABLET ORAL at 10:23

## 2023-11-15 RX ADMIN — ASPIRIN 81 MG: 81 TABLET, COATED ORAL at 18:53

## 2023-11-15 RX ADMIN — APIXABAN 5 MG: 5 TABLET, FILM COATED ORAL at 10:18

## 2023-11-15 RX ADMIN — IPRATROPIUM BROMIDE AND ALBUTEROL SULFATE 3 ML: 2.5; .5 SOLUTION RESPIRATORY (INHALATION) at 07:21

## 2023-11-15 NOTE — PLAN OF CARE
Goal Outcome Evaluation:  Plan of Care Reviewed With: patient        Progress: improving  Outcome Evaluation: Pt tolerated treatment well this date. Required SBA for bed mobility, then CGA-min A to stand. Pt ambulated 40ft w/ Rw and CGA. Limited d/t fatigue, though no overt LOBs. Encouraged pt to ambulate in room w/ nsg during the day.      Anticipated Discharge Disposition (PT): assisted living, skilled nursing facility

## 2023-11-15 NOTE — THERAPY TREATMENT NOTE
Patient Name: Catherine Boyer  : 1940    MRN: [id number]                              Today's Date: 11/15/2023       Admit Date: 2023    Visit Dx:     ICD-10-CM ICD-9-CM   1. Acute renal failure, unspecified acute renal failure type  N17.9 584.9   2. Acute abdominal pain  R10.9 789.00     338.19   3. Nausea vomiting and diarrhea  R11.2 787.91    R19.7 787.01   4. Pulmonary infiltrate  R91.8 793.19     Patient Active Problem List   Diagnosis    Acute on chronic diastolic heart failure    CAD (coronary artery disease)    Nonbacterial thrombotic endocarditis    Paroxysmal atrial fibrillation    Myeloproliferative disorder    Microcytic anemia    Type 2 diabetes mellitus with circulatory disorder, without long-term current use of insulin    GERD (gastroesophageal reflux disease)    Hypertension    Personal history of transient ischemic attack (TIA), 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 deficits    Porcelain gallbladder    Breast cancer    Atherosclerosis of abdominal aorta    APC (atrial premature contractions)    Moderate malnutrition    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    Elevated troponin    TIA (transient ischemic attack)    PUD (peptic ulcer disease)    Anemia    Tachycardia    Myelofibrosis    Normocytic anemia    Acute kidney injury    Pulmonary infiltrate    Iatrogenic pneumothorax     Past Medical History:   Diagnosis Date    Atherosclerosis of abdominal aorta 2023    Atrial fibrillation     Breast cancer     CAD (coronary artery disease)     NSTEMI 2022: 90% ostial LAD, 99% D1, 70% mid-distal LAD (medical therapy). She received two stents (2.5x18 and 2.5x26mm Finesse LEFTY) but I don't know which one went to which lesion.    Carotid atherosclerosis     Cholecystitis 2022    Added automatically from request for surgery [id number]    Chronic diastolic (congestive) heart failure     COVID 10/29/2022    GERD (gastroesophageal reflux disease)     Glaucoma     History of cataract     Hypertension      Microcytic anemia     per Dr. oleg pate office  note 6/30/22-dd    Myeloproliferative disorder     JAK2 positive    Nonbacterial thrombotic endocarditis     6/2021: 4x5mm vegetation on the ventricular surface of the anterior MV, negative blood cultures    Porcelain gallbladder     PUD (peptic ulcer disease)     TIA (transient ischemic attack)     Type 2 diabetes mellitus     Type 2 diabetes mellitus with circulatory disorder, without long-term current use of insulin 07/14/2022    Upper GI bleed      Past Surgical History:   Procedure Laterality Date    BREAST LUMPECTOMY  1999    BRONCHOSCOPY Bilateral 11/8/2023    Procedure: BRONCHOSCOPY UNDER FLUORO WITH BAL,  BIOPSIES; ACETYLCYSTEIN 4ML GIVEN;  Surgeon: Raoul Alford MD;  Location: [facility] ENDOSCOPY;  Service: Pulmonary;  Laterality: Bilateral;  PRE- PULMONARY INFILTRATES  POST- SAME    CARDIAC CATHETERIZATION N/A 09/02/2022    Procedure: Coronary angiography;  Surgeon: Guero Verde MD;  Location: [facility] CATH INVASIVE LOCATION;  Service: Cardiovascular;  Laterality: N/A;    CARDIAC CATHETERIZATION N/A 09/02/2022    Procedure: Stent LEFTY coronary;  Surgeon: Guero Verde MD;  Location: [facility] CATH INVASIVE LOCATION;  Service: Cardiovascular;  Laterality: N/A;    CATARACT EXTRACTION  2011    CHOLECYSTECTOMY      CHOLECYSTECTOMY WITH INTRAOPERATIVE CHOLANGIOGRAM N/A 11/28/2022    Procedure: CHOLECYSTECTOMY LAPAROSCOPIC INTRAOPERATIVE CHOLANGIOGRAM;  Surgeon: Dani Grover Jr., MD;  Location: [facility] MAIN OR;  Service: General;  Laterality: N/A;    COLONOSCOPY N/A 02/09/2023    Procedure: COLONOSCOPY TO CECUM & T.I.;  Surgeon: Guero Ferris MD;  Location: [facility] ENDOSCOPY;  Service: Gastroenterology;  Laterality: N/A;  PRE- MELENA, GI BLEED  POST- DIVERTICULOSIS, INT HEMORRHOIDS    ENDOSCOPY N/A 01/25/2023    Procedure: ESOPHAGOGASTRODUODENOSCOPY WITH BIOPSIES;  Surgeon: Charles Diaz MD;  Location: [facility] ENDOSCOPY;  Service: Gastroenterology;   Laterality: N/A;  pre: abd pain, nausea  post: hiatal hernia, mild gastritis, sloughing of the esophagus    ENTEROSCOPY SMALL BOWEL N/A 02/09/2023    Procedure: ENTEROSCOPY SMALL BOWEL;  Surgeon: Guero Ferris MD;  Location: [facility] ENDOSCOPY;  Service: Gastroenterology;  Laterality: N/A;  PRE- MELENA, GI BLEED  POST- HIATAL HERNIA    JOINT REPLACEMENT      UPPER GASTROINTESTINAL ENDOSCOPY        General Information       Row Name 11/15/23 1432          OT Time and Intention    Document Type therapy note (daily note)  -     Mode of Treatment individual therapy;occupational therapy  -       Row Name 11/15/23 1432          General Information    Patient Profile Reviewed yes  -     Prior Level of Function independent:  -     Existing Precautions/Restrictions fall;oxygen therapy device and L/min  -     Barriers to Rehab medically complex  -       Row Name 11/15/23 1432          Living Environment    People in Home facility resident  -       Row Name 11/15/23 1432          Cognition    Orientation Status (Cognition) oriented x 4  -       Row Name 11/15/23 1432          Safety Issues, Functional Mobility    Impairments Affecting Function (Mobility) balance;endurance/activity tolerance;strength  -               User Key  (r) = Recorded By, (t) = Taken By, (c) = Cosigned By      Initials Name Provider Type     Bri Dyson OT Occupational Therapist                     Mobility/ADL's       Row Name 11/15/23 1433          Bed Mobility    Bed Mobility bed mobility (all) activities;rolling left;rolling right;scooting/bridging  -     All Activities, [place] (Bed Mobility) minimum assist (75% patient effort)  -     Rolling Left [place] (Bed Mobility) minimum assist (75% patient effort)  -     Rolling Right [place] (Bed Mobility) minimum assist (75% patient effort)  -     Scooting/Bridging [place] (Bed Mobility) minimum assist (75% patient effort)  -       Row Name 11/15/23 1431           Activities of Daily Living    BADL Assessment/Intervention toileting;upper body dressing;grooming  -[facility] Name 11/15/23 1433          Toileting Assessment/Training    [place] Level (Toileting) toileting skills;adjust/manage clothing;change pad/brief;maximum assist (25% patient effort)  -     Position (Toileting) edge of bed sitting  -[facility] Name 11/15/23 1433          Grooming Assessment/Training    [place] Level (Grooming) wash face, hands;set up;standby assist  -     Position (Grooming) edge of bed sitting  -[facility] Name 11/15/23 [facility]          Upper Body Dressing Assessment/Training    [place] Level (Upper Body Dressing) upper body dressing skills;doff;don;minimum assist (75% patient effort)  -     Position (Upper Body Dressing) edge of bed sitting  -               User Key  (r) = Recorded By, (t) = Taken By, (c) = Cosigned By      Initials Name Provider Type     Bri Dyson OT Occupational Therapist                   Obj/Interventions       [facility] Name 11/15/23 [facility]          Vision Assessment/Intervention    Visual Impairment/Limitations WNL  -[facility] Name 11/15/23 [facility]          Motor Skills    Motor Skills functional endurance  -     Functional Endurance Fair  -LH       Row Name 11/15/23 [facility]          Balance    Balance Assessment sitting static balance;sitting dynamic balance  -     Static Sitting Balance standby assist  -     Dynamic Sitting Balance standby assist  -     Position, Sitting Balance sitting edge of bed  -     Balance Interventions sitting;occupation based/functional task  -               User Key  (r) = Recorded By, (t) = Taken By, (c) = Cosigned By      Initials Name Provider Type     Bri Dyson OT Occupational Therapist                   Goals/Plan    No documentation.                  Clinical Impression       [facility] Name 11/15/23 [facility]          Pain Assessment    Pretreatment Pain Rating 0/10 - no pain  -     Posttreatment  Pain Rating 0/10 - no pain  -     Pain Intervention(s) Ambulation/increased activity;Repositioned  -       Row Name 11/15/23 1439          Plan of Care Review    Plan of Care Reviewed With patient  -     Progress improving  -     Outcome Evaluation Patient participated in ADL routine for toileting, grooming/hyigne, and UB dressing change. Patient requires min A with bed mobility with verbal cues for proper hand placement. Patient performed toileting requiring max A with toilet hygiene and clothing management with assist from nursing aid, min A with UB dressing, and SBA/ set up with grooming/hygiene at EOB. Patient will continue to benefit from skilled OT to address remaining functional deficits, recommend return to facility and receive therapy services at [facility].  -       Row Name 11/15/23 1437          Therapy Assessment/Plan (OT)    Rehab Potential (OT) fair, will monitor progress closely  -     Criteria for Skilled Therapeutic Interventions Met (OT) yes;skilled treatment is necessary  -     Therapy Frequency (OT) 3 times/wk  -       Row Name 11/15/23 1433          Therapy Plan Review/Discharge Plan (OT)    Anticipated Discharge Disposition (OT) home with assist;home with home health  -       Row Name 11/15/23 1435          Positioning and Restraints    Pre-Treatment Position in bed  -     Post Treatment Position bed  -     In Bed fowlers;call light within reach;encouraged to call for assist;exit alarm on  -               User Key  (r) = Recorded By, (t) = Taken By, (c) = Cosigned By      Initials Name Provider Type     Bri Dyson, OT Occupational Therapist                   Outcome Measures       Row Name 11/15/23 0876          How much help from another is currently needed...    Putting on and taking off regular lower body clothing? 2  -     Bathing (including washing, rinsing, and drying) 2  -     Toileting (which includes using toilet bed pan or urinal) 2  -     Putting on  and taking off regular upper body clothing 3  -LH     Taking care of personal grooming (such as brushing teeth) 3  -LH     Eating meals 3  -LH     AM-PAC 6 Clicks Score (OT) 15  -LH       Row Name 11/15/23 0800          How much help from another person do you currently need...    Turning from your back to your side while in flat bed without using bedrails? 3  -AM     Moving from lying on back to sitting on the side of a flat bed without bedrails? 2  -AM     Moving to and from a bed to a chair (including a wheelchair)? 3  -AM     Standing up from a chair using your arms (e.g., wheelchair, bedside chair)? 2  -AM     Climbing 3-5 steps with a railing? 3  -AM     To walk in hospital room? 2  -AM     AM-PAC 6 Clicks Score (PT) 15  -AM     Highest Level of Mobility Goal 4 --> Transfer to chair/commode  -AM       Row Name 11/15/23 1436          Functional Assessment    Outcome Measure Options AM-PAC 6 Clicks Daily Activity (OT)  -               User Key  (r) = Recorded By, (t) = Taken By, (c) = Cosigned By      Initials Name Provider Type    AM Almita Haro, RN Registered Nurse     Bri Dyson OT Occupational Therapist                    Occupational Therapy Education       Title: PT OT SLP Therapies (Done)       Topic: Occupational Therapy (Done)       Point: ADL training (Done)       Description:   Instruct learner(s) on proper safety adaptation and remediation techniques during self care or transfers.   Instruct in proper use of assistive devices.                  Learning Progress Summary             Patient Acceptance, E, VU by ML at 11/10/2023 0407    Acceptance, E, VU by ML at 11/9/2023 0432    Acceptance, E, VU by ML at 11/8/2023 0449    Acceptance, E, VU by ML at 11/7/2023 0517    Acceptance, E, VU by ML at 11/6/2023 0459    Acceptance, E, VU by  at 11/3/2023 1512    Comment: role of OT, plan of care, safety                         Point: Home exercise program (Done)       Description:   Instruct  learner(s) on appropriate technique for monitoring, assisting and/or progressing therapeutic exercises/activities.                  Learning Progress Summary             Patient Acceptance, E, VU by ML at 11/10/2023 0407    Acceptance, E, VU by ML at 11/9/2023 0432    Acceptance, E, VU by ML at 11/8/2023 0449    Acceptance, E, VU by ML at 11/7/2023 0517    Acceptance, E, VU by ML at 11/6/2023 0459                         Point: Precautions (Done)       Description:   Instruct learner(s) on prescribed precautions during self-care and functional transfers.                  Learning Progress Summary             Patient Acceptance, E, VU by ML at 11/10/2023 0407    Acceptance, E, VU by ML at 11/9/2023 0432    Acceptance, E, VU by ML at 11/8/2023 0449    Acceptance, E, VU by ML at 11/7/2023 0517    Acceptance, E, VU by ML at 11/6/2023 0459    Acceptance, E, VU by JW at 11/3/2023 1512    Comment: role of OT, plan of care, safety                         Point: Body mechanics (Done)       Description:   Instruct learner(s) on proper positioning and spine alignment during self-care, functional mobility activities and/or exercises.                  Learning Progress Summary             Patient Acceptance, E, VU by ML at 11/10/2023 0407    Acceptance, E, VU by ML at 11/9/2023 0432    Acceptance, E, VU by ML at 11/8/2023 0449    Acceptance, E, VU by ML at 11/7/2023 0517    Acceptance, E, VU by ML at 11/6/2023 0459    Acceptance, E, VU by JW at 11/3/2023 1512    Comment: role of OT, plan of care, safety                                         User Key       Initials Effective Dates Name Provider Type Discipline     06/10/21 -  Esperanza Lozada OT Occupational Therapist OT     06/15/23 -  Isatu Johnson, RN Registered Nurse Nurse                  OT Recommendation and Plan  Therapy Frequency (OT): 3 times/wk  Plan of Care Review  Plan of Care Reviewed With: patient  Progress: improving  Outcome Evaluation: Patient participated  in ADL routine for toileting, grooming/hyigne, and UB dressing change. Patient requires min A with bed mobility with verbal cues for proper hand placement. Patient performed toileting requiring max A with toilet hygiene and clothing management with assist from nursing aid, min A with UB dressing, and SBA/ set up with grooming/hygiene at EOB. Patient will continue to benefit from skilled OT to address remaining functional deficits, recommend return to facility and receive therapy services at [facility].     Time Calculation:         Time Calculation- OT       Row Name 11/15/23 1436             Time Calculation- OT    OT Start Time 0902  -      OT Stop Time 0926  -      OT Time Calculation (min) 24 min  -      Total Timed Code Minutes- OT 24 minute(s)  -      OT Received On 11/15/23  -      OT - Next Appointment 11/16/23  -         Timed Charges    98944 - OT Self Care/Mgmt Minutes 24  -         Total Minutes    Timed Charges Total Minutes 24  -       Total Minutes 24  -                User Key  (r) = Recorded By, (t) = Taken By, (c) = Cosigned By      Initials Name Provider Type     Bri Dyson OT Occupational Therapist                  Therapy Charges for Today       Code Description Service Date Service Provider Modifiers Qty    82595618827 HC OT SELF CARE/MGMT/TRAIN EA 15 MIN 11/15/2023 Bri Dyson OT GO 2                 Bri Dyson OT  11/15/2023

## 2023-11-15 NOTE — PROGRESS NOTES
LOS: 13 days     Chief Complaint/ Reason for encounter: KARLI    Subjective   11/06/23 : Patient resting with family at bedside able provide some history  She is doing well overall no new complaints  Weight up today but she is not edematous, not short of breath  She is on 3 L oxygen which is near her home rate  Eating some, drinking well according to family    11/7: No new complaints, bronchoscopy with biopsy scheduled for today  Oral intake remains very low, no shortness of breath chest pain or edema    11/8: Events noted, patient developed a right pneumothorax after her bronchoscopy today.  Higher than normal bleeding during the procedure noted, CT-guided chest tube fortunately not needed  She has been intermittently hypotensive, currently 89/48, O2 requirements about the same, 3 L    11/9: She doing a little better today, fortunately did not need a chest tube  Oxygen requirements near baseline of around 3 L    11/10 family room, says her appetite is low and that she is tired, bp near goal     11/11: Resting, no new complaints or events, no shortness of breath or chest pain overnight  Good appetite    11/12: She looks and feels well denies complaints, still with very poor appetite no dyspnea    11/13: Oral intake a little better per patient family and nursing, no nausea vomiting  No shortness of breath chest pain or edema    11/14: no acute events. Feeling ok. Denies sob or chest pain     11/15 no acute events. She is feeling well, no new complaints.     Medical history reviewed:  History of Present Illness    Subjective    History taken from: Patient and chart    Vital Signs  Temp:  [97.3 °F (36.3 °C)-98.6 °F (37 °C)] 97.3 °F (36.3 °C)  Heart Rate:  [] 86  Resp:  [18] 18  BP: ()/() 104/62       Wt Readings from Last 1 Encounters:   11/15/23 0600 63.7 kg (140 lb 6.9 oz)   11/14/23 0600 63.2 kg (139 lb 5.3 oz)   11/13/23 0600 66.4 kg (146 lb 6.2 oz)   11/12/23 0509 66.7 kg (147 lb)   11/11/23  "0545 67.1 kg (147 lb 14.9 oz)   11/10/23 0600 67.8 kg (149 lb 7.6 oz)   11/09/23 0600 67.7 kg (149 lb 4 oz)   11/08/23 0428 67.7 kg (149 lb 4 oz)   11/07/23 0600 67.6 kg (149 lb 0.5 oz)   11/06/23 0605 67.6 kg (149 lb 0.5 oz)   11/05/23 0507 62.6 kg (138 lb)   11/04/23 0539 62.6 kg (138 lb)   11/03/23 0556 63 kg (138 lb 14.2 oz)       Objective:  Vital signs: (most recent): Blood pressure 104/62, pulse 86, temperature 97.3 °F (36.3 °C), temperature source Oral, resp. rate 18, height 162 cm (63.78\"), weight 63.7 kg (140 lb 6.9 oz), SpO2 93%.                Objective:  General Appearance:  Comfortable, well-appearing, in no acute distress and not in pain.  Awake, alert, oriented. Eating lunch   HEENT: Mucous membranes moist, no injury, oropharynx clear  Lungs:  Normal effort and normal respiratory rate.  Breath sounds clear to auscultation.  No  respiratory distress.  No rales, decreased breath sounds or rhonchi.    Heart: Normal rate.  Regular rhythm.  S1, S2 normal.  No murmur.   Abdomen: Abdomen is soft.  Bowel sounds are normal, no abdominal tenderness.  There is no rebound or guarding  Extremities: no edema of bilateral lower extremities  Neurological: No focal motor or sensory deficits, pupils reactive  Skin:  Warm and dry.  No rash or cyanosis.       Results Review:    Intake/Output:     Intake/Output Summary (Last 24 hours) at 11/15/2023 1617  Last data filed at 11/15/2023 1200  Gross per 24 hour   Intake 240 ml   Output --   Net 240 ml         DATA:  Radiology and Labs:  The following labs independently reviewed by me. Additional labs ordered for tomorrow a.m.  Interval notes, chart personally reviewed by me.   Old records independently reviewed showing normal baseline creatinine, history of CHF  Discussed with patient's family at bedside    Risk/ complexity of medical care/ medical decision making moderate risk, KARLI, fluid and electrolyte management    Labs:   Recent Results (from the past 24 hour(s)) "   Phosphorus    Collection Time: 11/14/23  6:57 PM    Specimen: Blood   Result Value Ref Range    Phosphorus 3.9 2.5 - 4.5 mg/dL   Renal Function Panel    Collection Time: 11/15/23  6:06 AM    Specimen: Blood   Result Value Ref Range    Glucose 127 (H) 65 - 99 mg/dL    BUN 18 8 - 23 mg/dL    Creatinine 0.79 0.57 - 1.00 mg/dL    Sodium 139 136 - 145 mmol/L    Potassium 4.1 3.5 - 5.2 mmol/L    Chloride 97 (L) 98 - 107 mmol/L    CO2 29.6 (H) 22.0 - 29.0 mmol/L    Calcium 9.2 8.6 - 10.5 mg/dL    Albumin 3.9 3.5 - 5.2 g/dL    Phosphorus 3.7 2.5 - 4.5 mg/dL    Anion Gap 12.4 5.0 - 15.0 mmol/L    BUN/Creatinine Ratio 22.8 7.0 - 25.0    eGFR 73.4 >60.0 mL/min/1.73   CBC Auto Differential    Collection Time: 11/15/23  6:06 AM    Specimen: Blood   Result Value Ref Range    WBC 55.98 (C) 3.40 - 10.80 10*3/mm3    RBC 2.80 (L) 3.77 - 5.28 10*6/mm3    Hemoglobin 8.1 (L) 12.0 - 15.9 g/dL    Hematocrit 25.8 (L) 34.0 - 46.6 %    MCV 92.1 79.0 - 97.0 fL    MCH 28.9 26.6 - 33.0 pg    MCHC 31.4 (L) 31.5 - 35.7 g/dL    RDW 16.8 (H) 12.3 - 15.4 %    RDW-SD 54.6 (H) 37.0 - 54.0 fl    MPV 11.8 6.0 - 12.0 fL    Platelets 201 140 - 450 10*3/mm3   Manual Differential    Collection Time: 11/15/23  6:06 AM    Specimen: Blood   Result Value Ref Range    Neutrophil % 83.0 (H) 42.7 - 76.0 %    Lymphocyte % 0.0 (L) 19.6 - 45.3 %    Monocyte % 11.0 5.0 - 12.0 %    Metamyelocyte % 1.0 (H) 0.0 - 0.0 %    Myelocyte % 5.0 (H) 0.0 - 0.0 %    Neutrophils Absolute 46.46 (H) 1.70 - 7.00 10*3/mm3    Lymphocytes Absolute 0.00 (L) 0.70 - 3.10 10*3/mm3    Monocytes Absolute 6.16 (H) 0.10 - 0.90 10*3/mm3    RBC Morphology Normal Normal    WBC Morphology Normal Normal    Platelet Morphology Normal Normal       Radiology:  Pertinent radiology studies were reviewed as described above      Medications have been reviewed separately in chart overview    Narrative & Impression   Portable chest radiograph     HISTORY: Pneumothorax     TECHNIQUE: Single AP portable  radiograph of the chest     COMPARISON: Chest radiograph 11/8/2023     IMPRESSION:  FINDINGS AND IMPRESSION:  Previously seen right pneumothorax has decreased in size resulting in  approximately 1.6 cm in pleural-parenchymal separation (previously 2.8  cm). There is moderate pulmonary opacification throughout bilateral  lungs, right greater than left, suggestive of multifocal pneumonia  and/or pulmonary edema in the appropriate clinical context and  correlation with patient history is recommended with follow-up chest CT  if clinically indicated. Given the somewhat masslike opacification  overlying the right lung, at least continued attention on follow-up  chest radiograph in 4 to 6 weeks is recommended to ensure appropriate  evolution/resolution and exclude any possibility of neoplasm.            ASSESSMENT:  Acute kidney injury, prerenal, rapidly improving and near baseline  Chronic diastolic CHF - EF 55%   Nausea vomiting and dehydration, better  Left lingular lung mass  Myeloproliferative disorder, hematology now following  Chronic leukocytosis  Atrial fibrillation  Diarrhea, slowly resolving  Acute on chronic anemia  Diabetes mellitus  UTI on ertapenem, ESBL E. coli  Lung mass, biopsy 11/8  Postop pneumothorax, had improved by the time chest tube was to be placed      DISCUSSION/PLAN:   Renal function, volume and most electrolytes remain at goal  Oral intake seems to be improving  Continue current diuretic dose for now  Monitor serum CO2    Phos  at goal today     Continue to monitor electrolytes and volume closely  Follow-up a.m. labs      Minerva Whaley MD  [facility]   Office phone number: [phone]  Answering service phone number: [phone]    11/15/23  16:17 EST

## 2023-11-15 NOTE — THERAPY TREATMENT NOTE
Patient Name: Catherine Boyer  : 1940    MRN: [id number]                              Today's Date: 11/15/2023       Admit Date: 2023    Visit Dx:     ICD-10-CM ICD-9-CM   1. Acute renal failure, unspecified acute renal failure type  N17.9 584.9   2. Acute abdominal pain  R10.9 789.00     338.19   3. Nausea vomiting and diarrhea  R11.2 787.91    R19.7 787.01   4. Pulmonary infiltrate  R91.8 793.19     Patient Active Problem List   Diagnosis    Acute on chronic diastolic heart failure    CAD (coronary artery disease)    Nonbacterial thrombotic endocarditis    Paroxysmal atrial fibrillation    Myeloproliferative disorder    Microcytic anemia    Type 2 diabetes mellitus with circulatory disorder, without long-term current use of insulin    GERD (gastroesophageal reflux disease)    Hypertension    Personal history of transient ischemic attack (TIA), 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 deficits    Porcelain gallbladder    Breast cancer    Atherosclerosis of abdominal aorta    APC (atrial premature contractions)    Moderate malnutrition    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    Elevated troponin    TIA (transient ischemic attack)    PUD (peptic ulcer disease)    Anemia    Tachycardia    Myelofibrosis    Normocytic anemia    Acute kidney injury    Pulmonary infiltrate    Iatrogenic pneumothorax     Past Medical History:   Diagnosis Date    Atherosclerosis of abdominal aorta 2023    Atrial fibrillation     Breast cancer     CAD (coronary artery disease)     NSTEMI 2022: 90% ostial LAD, 99% D1, 70% mid-distal LAD (medical therapy). She received two stents (2.5x18 and 2.5x26mm Finesse LEFTY) but I don't know which one went to which lesion.    Carotid atherosclerosis     Cholecystitis 2022    Added automatically from request for surgery [id number]    Chronic diastolic (congestive) heart failure     COVID 10/29/2022    GERD (gastroesophageal reflux disease)     Glaucoma     History of cataract     Hypertension      Microcytic anemia     per Dr. oleg pate office  note 6/30/22-dd    Myeloproliferative disorder     JAK2 positive    Nonbacterial thrombotic endocarditis     6/2021: 4x5mm vegetation on the ventricular surface of the anterior MV, negative blood cultures    Porcelain gallbladder     PUD (peptic ulcer disease)     TIA (transient ischemic attack)     Type 2 diabetes mellitus     Type 2 diabetes mellitus with circulatory disorder, without long-term current use of insulin 07/14/2022    Upper GI bleed      Past Surgical History:   Procedure Laterality Date    BREAST LUMPECTOMY  1999    BRONCHOSCOPY Bilateral 11/8/2023    Procedure: BRONCHOSCOPY UNDER FLUORO WITH BAL,  BIOPSIES; ACETYLCYSTEIN 4ML GIVEN;  Surgeon: Raoul Alford MD;  Location: [facility] ENDOSCOPY;  Service: Pulmonary;  Laterality: Bilateral;  PRE- PULMONARY INFILTRATES  POST- SAME    CARDIAC CATHETERIZATION N/A 09/02/2022    Procedure: Coronary angiography;  Surgeon: Guero Verde MD;  Location: [facility] CATH INVASIVE LOCATION;  Service: Cardiovascular;  Laterality: N/A;    CARDIAC CATHETERIZATION N/A 09/02/2022    Procedure: Stent LEFTY coronary;  Surgeon: Guero Verde MD;  Location: [facility] CATH INVASIVE LOCATION;  Service: Cardiovascular;  Laterality: N/A;    CATARACT EXTRACTION  2011    CHOLECYSTECTOMY      CHOLECYSTECTOMY WITH INTRAOPERATIVE CHOLANGIOGRAM N/A 11/28/2022    Procedure: CHOLECYSTECTOMY LAPAROSCOPIC INTRAOPERATIVE CHOLANGIOGRAM;  Surgeon: Dani Grover Jr., MD;  Location: [facility] MAIN OR;  Service: General;  Laterality: N/A;    COLONOSCOPY N/A 02/09/2023    Procedure: COLONOSCOPY TO CECUM & T.I.;  Surgeon: Guero Ferris MD;  Location: [facility] ENDOSCOPY;  Service: Gastroenterology;  Laterality: N/A;  PRE- MELENA, GI BLEED  POST- DIVERTICULOSIS, INT HEMORRHOIDS    ENDOSCOPY N/A 01/25/2023    Procedure: ESOPHAGOGASTRODUODENOSCOPY WITH BIOPSIES;  Surgeon: Charles Diaz MD;  Location: [facility] ENDOSCOPY;  Service: Gastroenterology;   Laterality: N/A;  pre: abd pain, nausea  post: hiatal hernia, mild gastritis, sloughing of the esophagus    ENTEROSCOPY SMALL BOWEL N/A 02/09/2023    Procedure: ENTEROSCOPY SMALL BOWEL;  Surgeon: Guero Ferris MD;  Location: [facility] ENDOSCOPY;  Service: Gastroenterology;  Laterality: N/A;  PRE- MELENA, GI BLEED  POST- HIATAL HERNIA    JOINT REPLACEMENT      UPPER GASTROINTESTINAL ENDOSCOPY        General Information       Row Name 11/15/23 1646          Physical Therapy Time and Intention    Document Type therapy note (daily note)  -     Mode of Treatment physical therapy  -       Row Name 11/15/23 1646          General Information    Existing Precautions/Restrictions fall;oxygen therapy device and L/min  -       Row Name 11/15/23 1646          Cognition    Orientation Status (Cognition) oriented x 4  -               User Key  (r) = Recorded By, (t) = Taken By, (c) = Cosigned By      Initials Name Provider Type     Sarah Su PTA Physical Therapist Assistant                   Mobility       Row Name 11/15/23 1646          Bed Mobility    Bed Mobility supine-sit;sit-supine  -     Supine-Sit [place] (Bed Mobility) standby assist  -     Sit-Supine [place] (Bed Mobility) standby assist  -     Assistive Device (Bed Mobility) bed rails;head of bed elevated  -       Row Name 11/15/23 1646          Sit-Stand Transfer    Sit-Stand [place] (Transfers) contact guard;minimum assist (75% patient effort)  -     Assistive Device (Sit-Stand Transfers) walker, front-wheeled  -       Row Name 11/15/23 1646          Gait/Stairs (Locomotion)    [place] Level (Gait) contact guard  -     Assistive Device (Gait) walker, front-wheeled  -     Distance in Feet (Gait) 40  -     Deviations/Abnormal Patterns (Gait) jeanna decreased;stride length decreased  -     Bilateral Gait Deviations forward flexed posture  -               User Key  (r) = Recorded By, (t) = Taken By, (c) =  Cosigned By      Initials Name Provider Type     Sarah Su PTA Physical Therapist Assistant                   Obj/Interventions       Row Name 11/15/23 1648          Motor Skills    Therapeutic Exercise --  seated AP, LAQ, marches x10 reps  -               User Key  (r) = Recorded By, (t) = Taken By, (c) = Cosigned By      Initials Name Provider Type    Sarah Donovan PTA Physical Therapist Assistant                   Goals/Plan    No documentation.                  Clinical Impression       Row Name 11/15/23 1648          Pain    Pretreatment Pain Rating 0/10 - no pain  -SM     Posttreatment Pain Rating 0/10 - no pain  -SM       [facility] Name 11/15/23 1648          Positioning and Restraints    Pre-Treatment Position in bed  -     Post Treatment Position bed  -SM     In Bed supine;call light within reach;encouraged to call for assist;exit alarm on  -               User Key  (r) = Recorded By, (t) = Taken By, (c) = Cosigned By      Initials Name Provider Type    Sarah Donovan PTA Physical Therapist Assistant                   Outcome Measures       Row Name 11/15/23 1648 11/15/23 0800       How much help from another person do you currently need...    Turning from your back to your side while in flat bed without using bedrails? 3  -SM 3  -AM    Moving from lying on back to sitting on the side of a flat bed without bedrails? 3  -SM 2  -AM    Moving to and from a bed to a chair (including a wheelchair)? 3  -SM 3  -AM    Standing up from a chair using your arms (e.g., wheelchair, bedside chair)? 3  -SM 2  -AM    Climbing 3-5 steps with a railing? 3  -SM 3  -AM    To walk in hospital room? 3  -SM 2  -AM    AM-PAC 6 Clicks Score (PT) 18  -SM 15  -AM    Highest Level of Mobility Goal 6 --> Walk 10 steps or more  - 4 --> Transfer to chair/commode  -AM      Row Name 11/15/23 1648 11/15/23 1436       Functional Assessment    Outcome Measure Options AM-PAC 6 Clicks Basic Mobility (PT)  -  AM-PAC 6 Clicks Daily Activity (OT)  -              User Key  (r) = Recorded By, (t) = Taken By, (c) = Cosigned By      Initials Name Provider Type    Sarah Donovan PTA Physical Therapist Assistant    Almita Martinez RN Registered Nurse    Bri Soler OT Occupational Therapist                                 Physical Therapy Education       Title: PT OT SLP Therapies (Done)       Topic: Physical Therapy (Done)       Point: Mobility training (Done)       Learning Progress Summary             Patient Acceptance, E,TB,D, VU,NR by  at 11/15/2023 1648    Acceptance, E,TB,D, VU,NR by  at 11/10/2023 1511    Acceptance, E, VU by  at 11/10/2023 0407    Acceptance, E, VU by ML at 11/9/2023 0432    Acceptance, E, VU by ML at 11/8/2023 0449    Acceptance, E, VU by ML at 11/7/2023 0517    Acceptance, E, VU by ML at 11/6/2023 0459    Acceptance, E, VU by SK at 11/3/2023 1142   Family Acceptance, E, VU by SK at 11/3/2023 1142                         Point: Home exercise program (Done)       Learning Progress Summary             Patient Acceptance, E,TB,D, VU,NR by  at 11/15/2023 1648    Acceptance, E,TB,D, VU,NR by  at 11/14/2023 1435    Acceptance, E,TB,D, VU,NR by  at 11/10/2023 1511    Acceptance, E, VU by  at 11/10/2023 0407    Acceptance, E, VU by ML at 11/9/2023 0432    Acceptance, E, VU by ML at 11/8/2023 0449    Acceptance, E, VU by ML at 11/7/2023 0517    Acceptance, E, VU by ML at 11/6/2023 0459                         Point: Body mechanics (Done)       Learning Progress Summary             Patient Acceptance, E,TB,D, VU,NR by  at 11/15/2023 1648    Acceptance, E,TB,D, VU,NR by  at 11/10/2023 1511    Acceptance, E, VU by  at 11/10/2023 0407    Acceptance, E, VU by ML at 11/9/2023 0432    Acceptance, E, VU by ML at 11/8/2023 0449    Acceptance, E, VU by ML at 11/7/2023 0517    Acceptance, E, VU by ML at 11/6/2023 0459    Acceptance, E, VU by SK at 11/3/2023 1142   Family  Acceptance, E, VU by SK at 11/3/2023 1142                         Point: Precautions (Done)       Learning Progress Summary             Patient Acceptance, E,TB,D, VU,NR by  at 11/15/2023 1648    Acceptance, E,TB,D, VU,NR by  at 11/10/2023 1511    Acceptance, E, VU by  at 11/10/2023 0407    Acceptance, E, VU by  at 11/9/2023 0432    Acceptance, E, VU by  at 11/8/2023 0449    Acceptance, E, VU by  at 11/7/2023 0517    Acceptance, E, VU by  at 11/6/2023 0459    Acceptance, E, VU by SK at 11/3/2023 1142   Family Acceptance, E, VU by SK at 11/3/2023 1142                                         User Key       Initials Effective Dates Name Provider Type Discipline     03/07/18 -  Sarah Su PTA Physical Therapist Assistant PT     06/15/23 -  Isatu Johnson RN Registered Nurse Nurse    SK 10/10/23 -  Nessa Barajas, OZIEL Student PT Student PT                  PT Recommendation and Plan     Plan of Care Reviewed With: patient  Progress: improving  Outcome Evaluation: Pt tolerated treatment well this date. Required SBA for bed mobility, then CGA-min A to stand. Pt ambulated 40ft w/ Rw and CGA. Limited d/t fatigue, though no overt LOBs. Encouraged pt to ambulate in room w/ nsg during the day.     Time Calculation:         PT Charges       Row Name 11/15/23 1651             Time Calculation    Start Time 1559  -      Stop Time 1615  -      Time Calculation (min) 16 min  -      PT Received On 11/15/23  -      PT - Next Appointment 11/16/23  -                User Key  (r) = Recorded By, (t) = Taken By, (c) = Cosigned By      Initials Name Provider Type     Sarah Su PTA Physical Therapist Assistant                  Therapy Charges for Today       Code Description Service Date Service Provider Modifiers Qty    28782508651 HC PT THER PROC EA 15 MIN 11/14/2023 Sarah Su PTA GP 1    94952818086 HC GAIT TRAINING EA 15 MIN 11/15/2023 Sarah Su PTA GP 1             PT G-Codes  Outcome Measure Options: AM-PAC 6 Clicks Basic Mobility (PT)  AM-PAC 6 Clicks Score (PT): 18  AM-PAC 6 Clicks Score (OT): 15  PT Discharge Summary  Anticipated Discharge Disposition (PT): assisted living, skilled nursing facility    Sarah Su, PTA  11/15/2023

## 2023-11-15 NOTE — PROGRESS NOTES
Discharge Planning Assessment  [facility]     Patient Name: Catherine Boyer  MRN: [id number]  Today's Date: 11/15/2023    Admit Date: 11/2/2023    Plan: Skilled rehab pending approval and [employer] Medicare precert   Discharge Needs Assessment    No documentation.                  Discharge Plan       Row Name 11/15/23 1537       Plan    Plan Skilled rehab pending approval and [employer] Medicare precert    Plan Comments Per [facility] she stated patient can not be accepted for skilled rehab at [facility] facilities accept the patient due to the cost of Momelotinib Chemo drug. Call placed to daughter and explained to her the reason why she has been denied for [facility] facilities.  Daughter  was able to provide more skilled rehab choices. New referrals placed in chanel. Hansel ZENG                  Continued Care and Services - Admitted Since 11/2/2023       Destination       Service Provider Request Status Selected Services Address Phone Fax Patient Preferred    [facility] Pending - Request Sent N/A [address] [facility] [address] [phone] --    [facility] Pending - Request Sent N/A [address] [facility] [address] [phone] --    [facility] Pending - Request Sent N/A [address] [phone] --    [facility] Pending - Request Sent N/A [address] [facility] [id number] [phone] --    [facility] Pending - Request Sent N/A [address] [facility] [address] [phone] --    [facility] Pending - Request Sent N/A [address] [facility] [address] [phone] --    [facility] Declined  Patient Insurance Not Accepted N/A [address] [facility] [facility] [address] [phone] --              Home Medical Care       Service Provider Request Status Selected Services Address  Phone Fax Patient Preferred    [facility] [address] [place], [address], [facility] [address] [phone] --                  Selected Continued Care - Episodes Includes continued care and service providers with selected services from the active episodes listed below      Oncology- External Fill Episode start date: 10/10/2023   There are no active outsourced providers for this episode.                 Selected Continued Care - Prior Encounters Includes continued care and service providers with selected services from prior encounters from 8/4/2023 to 11/15/2023      Discharged on 10/2/2023 Admission date: 9/27/2023 - Discharge disposition: Home-Health Care c      Home Medical Care       Service Provider Selected Services Address Phone Fax Patient [facility] [address], [place], [address], [facility] [address] [phone] --                          Expected Discharge Date and Time       Expected Discharge Date Expected Discharge Time    Nov 16, 2023            Demographic Summary    No documentation.                  Functional Status    No documentation.                  Psychosocial    No documentation.                  Abuse/Neglect    No documentation.                  Legal    No documentation.                  Substance Abuse    No documentation.                  Patient Forms    No documentation.                     Neema Parsons, RN

## 2023-11-15 NOTE — PROGRESS NOTES
Name: Catherine Boyer ADMIT: 2023   : 1940  PCP: Kristi Moreau APRN    MRN: [id number] LOS: 13 days   AGE/SEX: 83 y.o. female  ROOM: [facility]     Subjective   Subjective   Resting in bed on her side. No apparent distress. Awaiting rehab placement. WBC elevated but consistent with myeloproliferative disorder.      Objective   Objective   Vital Signs  Temp:  [97.9 °F (36.6 °C)-98.6 °F (37 °C)] 97.9 °F (36.6 °C)  Heart Rate:  [] 120  Resp:  [18] 18  BP: ()/() 131/101  SpO2:  [95 %-99 %] 98 %  on  Flow (L/min):  [3] 3;   Device (Oxygen Therapy): nasal cannula  Body mass index is 24.27 kg/m².  Physical Exam  Vitals and nursing note reviewed.   Constitutional:       General: She is not in acute distress.     Comments: Elderly, chronically ill-appearing   Cardiovascular:      Rate and Rhythm: Normal rate and regular rhythm.      Pulses: Normal pulses.      Heart sounds: Normal heart sounds.   Pulmonary:      Effort: Pulmonary effort is normal. No respiratory distress.      Breath sounds: Normal breath sounds. No wheezing.   Abdominal:      General: Bowel sounds are normal.      Palpations: Abdomen is soft.      Tenderness: There is no abdominal tenderness.   Skin:     General: Skin is warm and dry.      Coloration: Skin is pale.   Neurological:      Mental Status: She is alert.       Results Review     I reviewed the patient's new clinical results.  Results from last 7 days   Lab Units 11/15/23  0606 23  0541 23  0728   WBC 10*3/mm3 55.98* 72.56* 59.77* 59.91*   HEMOGLOBIN g/dL 8.1* 8.4* 7.8* 8.3*   PLATELETS 10*3/mm3 201 203 201 224     Results from last 7 days   Lab Units 11/15/23  0606 23  0620 236 23  0541 23  0728   SODIUM mmol/L 139 139  --  140 140   POTASSIUM mmol/L 4.1 4.7 4.9 3.1* 3.8   CHLORIDE mmol/L 97* 100  --  100 102   CO2 mmol/L 29.6* 26.8  --  28.5 28.0   BUN mg/dL 18 13  --  13 13   CREATININE mg/dL 0.79 0.89  --   "0.78 0.81   GLUCOSE mg/dL 127* 92  --  103* 104*   EGFR mL/min/1.73 73.4 63.6  --  74.5 71.2     Results from last 7 days   Lab Units 11/15/23  0606 11/14/23  0620 11/13/23  0541 11/12/23  0728   ALBUMIN g/dL 3.9 3.6 3.5 4.1     Results from last 7 days   Lab Units 11/15/23  0606 11/14/23  1857 11/14/23  0620 11/13/23  0541 11/12/23  0728 11/11/23  0702 11/09/23  0214 11/09/23  0008   CALCIUM mg/dL 9.2  --  9.1 9.1 9.5 9.3   < >  --    ALBUMIN g/dL 3.9  --  3.6 3.5 4.1 3.8   < >  --    MAGNESIUM mg/dL  --   --   --   --   --  1.6  --  1.6   PHOSPHORUS mg/dL 3.7 3.9 2.2* 2.6 2.9 2.6   < >  --     < > = values in this interval not displayed.           No results found for: \"HGBA1C\", \"POCGLU\"    No radiology results for the last day    I have personally reviewed all medications:  Scheduled Medications  apixaban, 5 mg, Oral, Q12H  famotidine, 20 mg, Oral, Daily  furosemide, 10 mg, Oral, Daily  ipratropium-albuterol, 3 mL, Nebulization, Q8H - RT  latanoprost, 1 drop, Both Eyes, Nightly  lidocaine, 20 mL, Infiltration, Once  methylPREDNISolone sodium succinate, 40 mg, Intravenous, Q12H  metoprolol succinate XL, 25 mg, Oral, Daily  Momelotinib Dihydrochloride, 200 mg, Oral, Daily  saccharomyces boulardii, 250 mg, Oral, BID  senna-docusate sodium, 2 tablet, Oral, BID  sertraline, 100 mg, Oral, Daily    Infusions   Diet  Diet: Cardiac Diets, Diabetic Diets; Healthy Heart (2-3 Na+); Consistent Carbohydrate; Texture: Regular Texture (IDDSI 7); Fluid Consistency: Thin (IDDSI 0)    I have personally reviewed:  [x]  Laboratory   []  Microbiology   []  Radiology   [x]  EKG/Telemetry  []  Cardiology/Vascular   []  Pathology    []  Records       Assessment/Plan     Active Hospital Problems    Diagnosis  POA    **Acute kidney injury [N17.9]  Yes    Iatrogenic pneumothorax [J95.811]  No    Pulmonary infiltrate [R91.8]  Unknown    Myelofibrosis [D75.81]  Yes    Personal history of transient ischemic attack (TIA), and cerebral " 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       83 y.o. female admitted with Acute kidney injury.    Acute Cystitis without Hematuria  - Urine culture grew ESBL.  - Completed ertapenem     KARLI  - Resolved with IVF  - Order repeat BMP in AM for reassessment.  - Appreciate nephrology recs- lasix restarted     Lung mass  - Indeterminate, lingular on imaging. s/p bronchoscopy 11/8/23 with biopsies, pathology showing no evidence of malignancy but only 3 biopsies (as opposed to 6-10) were obtained due to bleeding  - has small iatrogenic pneumothorax, improved with conservative treatment  - follow up chest CT scan without contrast in 6-8 weeks  - appreciate pulmonology recs, they have signed off-follow up with [facility] for Dr. Billings, in 8 weeks     Aortic Stenosis/Chronic Diastolic CHF  Coronary artery disease  Paroxysmal atrial fibrillation  Hypertension  - Continue Eliquis, plavix held on admission however given her issues with anemia, cardiology rec resuming ASA 81mg- started 11/15  - continue metoprolol 25, some degree of tachycardia expected given acute issues; HR is overall improving  - continue lasix     Myeloproliferative disorder/Anemia  Functional iron deficiency  Leukocytosis  - Has chronic leukocytosis, values elevated on most recent labs, however, relatively stable from prior.  - s/p 1 unit of PRBCs 11/6/23 and 11/7/23 with good response; also received ferrelicit  - oncology evaluated, o/p f/u planned- signed off  - daily cbc    SCDs for DVT prophylaxis.  Full code.  Discussed with nursing staff.  Anticipate discharge to SNU facility once  arrangements have been made.      Chandni Luo MD  [facility]  11/15/23

## 2023-11-15 NOTE — PLAN OF CARE
Problem: Adult Inpatient Plan of Care  Goal: Plan of Care Review  Outcome: Ongoing, Progressing  Flowsheets (Taken 11/15/2023 0425)  Progress: improving  Plan of Care Reviewed With: patient  Outcome Evaluation: A&Ox4, VSS, no complaints of pain or discomfort, incontinent at times, turns self in bed, O2 on at 3L which is baseline, plan of care continues.

## 2023-11-15 NOTE — PLAN OF CARE
Goal Outcome Evaluation:  Plan of Care Reviewed With: patient        Progress: improving  Outcome Evaluation: Patient participated in ADL routine for toileting, grooming/hyigne, and UB dressing change. Patient requires min A with bed mobility with verbal cues for proper hand placement. Patient performed toileting requiring max A with toilet hygiene and clothing management with assist from nursing aid, min A with UB dressing, and SBA/ set up with grooming/hygiene at EOB. Patient will continue to benefit from skilled OT to address remaining functional deficits, recommend return to facility and receive therapy services at [facility].      Anticipated Discharge Disposition (OT): home with assist, home with home health

## 2023-11-15 NOTE — PROGRESS NOTES
Following peripherally.    Nursing communicated with me about the white blood cell count.    Yesterday, the white blood cell count increased at 72,000.  Today, it has improved at 56,000.  Hemoglobin is 8.1.  Platelets 201,000.    She was sleeping very comfortably when I entered the room today, so I did not disturb her.

## 2023-11-16 LAB
ALBUMIN SERPL-MCNC: 3.7 G/DL (ref 3.5–5.2)
ANION GAP SERPL CALCULATED.3IONS-SCNC: 11.8 MMOL/L (ref 5–15)
BASOPHILS # BLD MANUAL: 1.17 10*3/MM3 (ref 0–0.2)
BASOPHILS NFR BLD MANUAL: 2 % (ref 0–1.5)
BUN SERPL-MCNC: 20 MG/DL (ref 8–23)
BUN/CREAT SERPL: 21.7 (ref 7–25)
CALCIUM SPEC-SCNC: 9.3 MG/DL (ref 8.6–10.5)
CHLORIDE SERPL-SCNC: 97 MMOL/L (ref 98–107)
CO2 SERPL-SCNC: 29.2 MMOL/L (ref 22–29)
CREAT SERPL-MCNC: 0.92 MG/DL (ref 0.57–1)
DEPRECATED RDW RBC AUTO: 60.1 FL (ref 37–54)
EGFRCR SERPLBLD CKD-EPI 2021: 61.1 ML/MIN/1.73
ERYTHROCYTE [DISTWIDTH] IN BLOOD BY AUTOMATED COUNT: 17.4 % (ref 12.3–15.4)
GEN 5 2HR TROPONIN T REFLEX: 33 NG/L
GLUCOSE SERPL-MCNC: 177 MG/DL (ref 65–99)
HCT VFR BLD AUTO: 26.5 % (ref 34–46.6)
HGB BLD-MCNC: 8.2 G/DL (ref 12–15.9)
LYMPHOCYTES # BLD MANUAL: 3.5 10*3/MM3 (ref 0.7–3.1)
LYMPHOCYTES NFR BLD MANUAL: 9 % (ref 5–12)
MCH RBC QN AUTO: 29.3 PG (ref 26.6–33)
MCHC RBC AUTO-ENTMCNC: 30.9 G/DL (ref 31.5–35.7)
MCV RBC AUTO: 94.6 FL (ref 79–97)
METAMYELOCYTES NFR BLD MANUAL: 2 % (ref 0–0)
MONOCYTES # BLD: 5.25 10*3/MM3 (ref 0.1–0.9)
MYELOCYTES NFR BLD MANUAL: 1 % (ref 0–0)
NEUTROPHILS # BLD AUTO: 46.63 10*3/MM3 (ref 1.7–7)
NEUTROPHILS NFR BLD MANUAL: 80 % (ref 42.7–76)
PHOSPHATE SERPL-MCNC: 4 MG/DL (ref 2.5–4.5)
PLAT MORPH BLD: NORMAL
PLATELET # BLD AUTO: 197 10*3/MM3 (ref 140–450)
PMV BLD AUTO: 11.7 FL (ref 6–12)
POTASSIUM SERPL-SCNC: 4.8 MMOL/L (ref 3.5–5.2)
QT INTERVAL: 353 MS
QTC INTERVAL: 501 MS
RBC # BLD AUTO: 2.8 10*6/MM3 (ref 3.77–5.28)
RBC MORPH BLD: NORMAL
SODIUM SERPL-SCNC: 138 MMOL/L (ref 136–145)
TROPONIN T DELTA: 0 NG/L
VARIANT LYMPHS NFR BLD MANUAL: 6 % (ref 19.6–45.3)
WBC MORPH BLD: NORMAL
WBC NRBC COR # BLD: 58.29 10*3/MM3 (ref 3.4–10.8)

## 2023-11-16 PROCEDURE — 80069 RENAL FUNCTION PANEL: CPT | Performed by: INTERNAL MEDICINE

## 2023-11-16 PROCEDURE — 97110 THERAPEUTIC EXERCISES: CPT

## 2023-11-16 PROCEDURE — 94760 N-INVAS EAR/PLS OXIMETRY 1: CPT

## 2023-11-16 PROCEDURE — 99231 SBSQ HOSP IP/OBS SF/LOW 25: CPT | Performed by: INTERNAL MEDICINE

## 2023-11-16 PROCEDURE — 85007 BL SMEAR W/DIFF WBC COUNT: CPT | Performed by: INTERNAL MEDICINE

## 2023-11-16 PROCEDURE — 94799 UNLISTED PULMONARY SVC/PX: CPT

## 2023-11-16 PROCEDURE — 97116 GAIT TRAINING THERAPY: CPT

## 2023-11-16 PROCEDURE — 85025 COMPLETE CBC W/AUTO DIFF WBC: CPT | Performed by: INTERNAL MEDICINE

## 2023-11-16 PROCEDURE — 94761 N-INVAS EAR/PLS OXIMETRY MLT: CPT

## 2023-11-16 PROCEDURE — 25010000002 METHYLPREDNISOLONE PER 40 MG: Performed by: INTERNAL MEDICINE

## 2023-11-16 PROCEDURE — 94664 DEMO&/EVAL PT USE INHALER: CPT

## 2023-11-16 RX ORDER — LORAZEPAM 0.5 MG/1
0.25 TABLET ORAL EVERY 6 HOURS PRN
Status: DISCONTINUED | OUTPATIENT
Start: 2023-11-16 | End: 2023-11-22 | Stop reason: HOSPADM

## 2023-11-16 RX ADMIN — Medication 250 MG: at 19:46

## 2023-11-16 RX ADMIN — LATANOPROST 1 DROP: 50 SOLUTION/ DROPS OPHTHALMIC at 21:15

## 2023-11-16 RX ADMIN — METHYLPREDNISOLONE SODIUM SUCCINATE 40 MG: 40 INJECTION, POWDER, LYOPHILIZED, FOR SOLUTION INTRAMUSCULAR; INTRAVENOUS at 05:22

## 2023-11-16 RX ADMIN — ASPIRIN 81 MG: 81 TABLET, COATED ORAL at 10:01

## 2023-11-16 RX ADMIN — IPRATROPIUM BROMIDE AND ALBUTEROL SULFATE 3 ML: 2.5; .5 SOLUTION RESPIRATORY (INHALATION) at 23:23

## 2023-11-16 RX ADMIN — SERTRALINE 100 MG: 100 TABLET, FILM COATED ORAL at 09:53

## 2023-11-16 RX ADMIN — HYDROCODONE BITARTRATE AND HOMATROPINE METHYLBROMIDE 5 ML: 1.5; 5 SYRUP ORAL at 10:01

## 2023-11-16 RX ADMIN — METOPROLOL SUCCINATE 25 MG: 25 TABLET, EXTENDED RELEASE ORAL at 09:53

## 2023-11-16 RX ADMIN — APIXABAN 5 MG: 5 TABLET, FILM COATED ORAL at 19:46

## 2023-11-16 RX ADMIN — FUROSEMIDE 10 MG: 20 TABLET ORAL at 09:53

## 2023-11-16 RX ADMIN — Medication 250 MG: at 09:54

## 2023-11-16 RX ADMIN — HYDROCODONE BITARTRATE AND HOMATROPINE METHYLBROMIDE 5 ML: 1.5; 5 SYRUP ORAL at 19:47

## 2023-11-16 RX ADMIN — Medication 10 ML: at 19:47

## 2023-11-16 RX ADMIN — HYDROCODONE BITARTRATE AND ACETAMINOPHEN 1 TABLET: 5; 325 TABLET ORAL at 10:02

## 2023-11-16 RX ADMIN — DOCUSATE SODIUM 50MG AND SENNOSIDES 8.6MG 2 TABLET: 8.6; 5 TABLET, FILM COATED ORAL at 19:46

## 2023-11-16 RX ADMIN — IPRATROPIUM BROMIDE AND ALBUTEROL SULFATE 3 ML: 2.5; .5 SOLUTION RESPIRATORY (INHALATION) at 14:27

## 2023-11-16 RX ADMIN — FAMOTIDINE 20 MG: 20 TABLET ORAL at 10:01

## 2023-11-16 RX ADMIN — HYDROCODONE BITARTRATE AND HOMATROPINE METHYLBROMIDE 5 ML: 1.5; 5 SYRUP ORAL at 23:54

## 2023-11-16 RX ADMIN — IPRATROPIUM BROMIDE AND ALBUTEROL SULFATE 3 ML: 2.5; .5 SOLUTION RESPIRATORY (INHALATION) at 06:24

## 2023-11-16 RX ADMIN — APIXABAN 5 MG: 5 TABLET, FILM COATED ORAL at 10:01

## 2023-11-16 NOTE — THERAPY TREATMENT NOTE
Patient Name: Catherine Boyer  : 1940    MRN: [id number]                              Today's Date: 2023       Admit Date: 2023    Visit Dx:     ICD-10-CM ICD-9-CM   1. Acute renal failure, unspecified acute renal failure type  N17.9 584.9   2. Acute abdominal pain  R10.9 789.00     338.19   3. Nausea vomiting and diarrhea  R11.2 787.91    R19.7 787.01   4. Pulmonary infiltrate  R91.8 793.19     Patient Active Problem List   Diagnosis    Acute on chronic diastolic heart failure    CAD (coronary artery disease)    Nonbacterial thrombotic endocarditis    Paroxysmal atrial fibrillation    Myeloproliferative disorder    Microcytic anemia    Type 2 diabetes mellitus with circulatory disorder, without long-term current use of insulin    GERD (gastroesophageal reflux disease)    Hypertension    Personal history of transient ischemic attack (TIA), 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 deficits    Porcelain gallbladder    Breast cancer    Atherosclerosis of abdominal aorta    APC (atrial premature contractions)    Moderate malnutrition    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    Elevated troponin    TIA (transient ischemic attack)    PUD (peptic ulcer disease)    Anemia    Tachycardia    Myelofibrosis    Normocytic anemia    Acute kidney injury    Pulmonary infiltrate    Iatrogenic pneumothorax     Past Medical History:   Diagnosis Date    Atherosclerosis of abdominal aorta 2023    Atrial fibrillation     Breast cancer     CAD (coronary artery disease)     NSTEMI 2022: 90% ostial LAD, 99% D1, 70% mid-distal LAD (medical therapy). She received two stents (2.5x18 and 2.5x26mm Finesse LEFTY) but I don't know which one went to which lesion.    Carotid atherosclerosis     Cholecystitis 2022    Added automatically from request for surgery [id number]    Chronic diastolic (congestive) heart failure     COVID 10/29/2022    GERD (gastroesophageal reflux disease)     Glaucoma     History of cataract     Hypertension      Microcytic anemia     per Dr. oleg pate office  note 6/30/22-dd    Myeloproliferative disorder     JAK2 positive    Nonbacterial thrombotic endocarditis     6/2021: 4x5mm vegetation on the ventricular surface of the anterior MV, negative blood cultures    Porcelain gallbladder     PUD (peptic ulcer disease)     TIA (transient ischemic attack)     Type 2 diabetes mellitus     Type 2 diabetes mellitus with circulatory disorder, without long-term current use of insulin 07/14/2022    Upper GI bleed      Past Surgical History:   Procedure Laterality Date    BREAST LUMPECTOMY  1999    BRONCHOSCOPY Bilateral 11/8/2023    Procedure: BRONCHOSCOPY UNDER FLUORO WITH BAL,  BIOPSIES; ACETYLCYSTEIN 4ML GIVEN;  Surgeon: Raoul Alford MD;  Location: [facility] ENDOSCOPY;  Service: Pulmonary;  Laterality: Bilateral;  PRE- PULMONARY INFILTRATES  POST- SAME    CARDIAC CATHETERIZATION N/A 09/02/2022    Procedure: Coronary angiography;  Surgeon: Guero Verde MD;  Location: [facility] CATH INVASIVE LOCATION;  Service: Cardiovascular;  Laterality: N/A;    CARDIAC CATHETERIZATION N/A 09/02/2022    Procedure: Stent LEFTY coronary;  Surgeon: Guero Verde MD;  Location: [facility] CATH INVASIVE LOCATION;  Service: Cardiovascular;  Laterality: N/A;    CATARACT EXTRACTION  2011    CHOLECYSTECTOMY      CHOLECYSTECTOMY WITH INTRAOPERATIVE CHOLANGIOGRAM N/A 11/28/2022    Procedure: CHOLECYSTECTOMY LAPAROSCOPIC INTRAOPERATIVE CHOLANGIOGRAM;  Surgeon: Dani Grover Jr., MD;  Location: [facility] MAIN OR;  Service: General;  Laterality: N/A;    COLONOSCOPY N/A 02/09/2023    Procedure: COLONOSCOPY TO CECUM & T.I.;  Surgeon: Guero Ferris MD;  Location: [facility] ENDOSCOPY;  Service: Gastroenterology;  Laterality: N/A;  PRE- MELENA, GI BLEED  POST- DIVERTICULOSIS, INT HEMORRHOIDS    ENDOSCOPY N/A 01/25/2023    Procedure: ESOPHAGOGASTRODUODENOSCOPY WITH BIOPSIES;  Surgeon: Charles Diaz MD;  Location: [facility] ENDOSCOPY;  Service: Gastroenterology;   Laterality: N/A;  pre: abd pain, nausea  post: hiatal hernia, mild gastritis, sloughing of the esophagus    ENTEROSCOPY SMALL BOWEL N/A 02/09/2023    Procedure: ENTEROSCOPY SMALL BOWEL;  Surgeon: Guero Ferris MD;  Location: [facility] ENDOSCOPY;  Service: Gastroenterology;  Laterality: N/A;  PRE- MELENA, GI BLEED  POST- HIATAL HERNIA    JOINT REPLACEMENT      UPPER GASTROINTESTINAL ENDOSCOPY        General Information       Row Name 11/16/23 1512          Physical Therapy Time and Intention    Document Type therapy note (daily note)  -     Mode of Treatment physical therapy  -       Row Name 11/16/23 1512          General Information    Existing Precautions/Restrictions fall;oxygen therapy device and L/min  -       Row Name 11/16/23 1512          Cognition    Orientation Status (Cognition) oriented x 4  -               User Key  (r) = Recorded By, (t) = Taken By, (c) = Cosigned By      Initials Name Provider Type     Sarah Su PTA Physical Therapist Assistant                   Mobility       Row Name 11/16/23 1515          Bed Mobility    Bed Mobility supine-sit  -     Supine-Sit Point Clear (Bed Mobility) standby assist  -     Assistive Device (Bed Mobility) bed rails;head of bed elevated  -       Row Name 11/16/23 1515          Sit-Stand Transfer    Sit-Stand Point [place] (Transfers) standby assist  -     Assistive Device (Sit-Stand Transfers) walker, front-wheeled  -       Row Name 11/16/23 1515          Gait/Stairs (Locomotion)    Point Clear Level (Gait) standby assist;contact guard  -     Assistive Device (Gait) walker, front-wheeled  -     Distance in Feet (Gait) 40  -     Deviations/Abnormal Patterns (Gait) jeanna decreased;stride length decreased  -     Bilateral Gait Deviations forward flexed posture  -               User Key  (r) = Recorded By, (t) = Taken By, (c) = Cosigned By      Initials Name Provider Type     Sarah Su PTA Physical Therapist  Assistant                   Obj/Interventions       Row Name 11/16/23 1521          Motor Skills    Therapeutic Exercise --  seated AP, LAQ, marches, hip abd/add x10 reps  -               User Key  (r) = Recorded By, (t) = Taken By, (c) = Cosigned By      Initials Name Provider Type    Sarah Donovan PTA Physical Therapist Assistant                   Goals/Plan    No documentation.                  Clinical Impression       Row Name 11/16/23 1521          Pain    Pretreatment Pain Rating 0/10 - no pain  -SM     Posttreatment Pain Rating 0/10 - no pain  -SM       [facility] Name 11/16/23 1521          Positioning and Restraints    Pre-Treatment Position in bed  -SM     Post Treatment Position bed  -SM     In Bed sitting EOB;with nsg;with family/caregiver  -               User Key  (r) = Recorded By, (t) = Taken By, (c) = Cosigned By      Initials Name Provider Type    Sarah Donovan PTA Physical Therapist Assistant                   Outcome Measures       Row Name 11/16/23 1522 11/16/23 0800       How much help from another person do you currently need...    Turning from your back to your side while in flat bed without using bedrails? 3  -SM 3  -AM    Moving from lying on back to sitting on the side of a flat bed without bedrails? 3  -SM 2  -AM    Moving to and from a bed to a chair (including a wheelchair)? 3  -SM 3  -AM    Standing up from a chair using your arms (e.g., wheelchair, bedside chair)? 3  -SM 2  -AM    Climbing 3-5 steps with a railing? 2  -SM 2  -AM    To walk in hospital room? 3  -SM 3  -AM    AM-PAC 6 Clicks Score (PT) 17  -SM 15  -AM    Highest Level of Mobility Goal 5 --> Static standing  -SM 4 --> Transfer to chair/commode  -AM      Row Name 11/16/23 1522          Functional Assessment    Outcome Measure Options AM-PAC 6 Clicks Basic Mobility (PT)  -               User Key  (r) = Recorded By, (t) = Taken By, (c) = Cosigned By      Initials Name Provider Type    Sarah Donovan  CHERYL Langston Physical Therapist Assistant    Almita Martinez RN Registered Nurse                                 Physical Therapy Education       Title: PT OT SLP Therapies (Done)       Topic: Physical Therapy (Done)       Point: Mobility training (Done)       Learning Progress Summary             Patient Acceptance, E,TB,D, VU,NR by  at 11/16/2023 1522    Acceptance, E,TB,D, VU,NR by  at 11/15/2023 1648    Acceptance, E,TB,D, VU,NR by  at 11/10/2023 1511    Acceptance, E, VU by ML at 11/10/2023 0407    Acceptance, E, VU by ML at 11/9/2023 0432    Acceptance, E, VU by ML at 11/8/2023 0449    Acceptance, E, VU by ML at 11/7/2023 0517    Acceptance, E, VU by ML at 11/6/2023 0459    Acceptance, E, VU by SK at 11/3/2023 1142   Family Acceptance, E, VU by SK at 11/3/2023 1142                         Point: Home exercise program (Done)       Learning Progress Summary             Patient Acceptance, E,TB,D, VU,NR by  at 11/16/2023 1522    Acceptance, E,TB,D, VU,NR by  at 11/15/2023 1648    Acceptance, E,TB,D, VU,NR by  at 11/14/2023 1435    Acceptance, E,TB,D, VU,NR by  at 11/10/2023 1511    Acceptance, E, VU by ML at 11/10/2023 0407    Acceptance, E, VU by ML at 11/9/2023 0432    Acceptance, E, VU by ML at 11/8/2023 0449    Acceptance, E, VU by ML at 11/7/2023 0517    Acceptance, E, VU by ML at 11/6/2023 0459                         Point: Body mechanics (Done)       Learning Progress Summary             Patient Acceptance, E,TB,D, VU,NR by  at 11/16/2023 1522    Acceptance, E,TB,D, VU,NR by  at 11/15/2023 1648    Acceptance, E,TB,D, VU,NR by  at 11/10/2023 1511    Acceptance, E, VU by ML at 11/10/2023 0407    Acceptance, E, VU by ML at 11/9/2023 0432    Acceptance, E, VU by ML at 11/8/2023 0449    Acceptance, E, VU by ML at 11/7/2023 0517    Acceptance, E, VU by ML at 11/6/2023 0459    Acceptance, E, VU by SK at 11/3/2023 1142   Family Acceptance, E, VU by SK at 11/3/2023 1142                          Point: Precautions (Done)       Learning Progress Summary             Patient Acceptance, E,TB,D, VU,NR by  at 11/16/2023 1522    Acceptance, E,TB,D, VU,NR by  at 11/15/2023 1648    Acceptance, E,TB,D, VU,NR by  at 11/10/2023 1511    Acceptance, E, VU by  at 11/10/2023 0407    Acceptance, E, VU by  at 11/9/2023 0432    Acceptance, E, VU by  at 11/8/2023 0449    Acceptance, E, VU by  at 11/7/2023 0517    Acceptance, E, VU by  at 11/6/2023 0459    Acceptance, E, VU by SK at 11/3/2023 1142   Family Acceptance, E, VU by SK at 11/3/2023 1142                                         User Key       Initials Effective Dates Name Provider Type Discipline     03/07/18 -  Sarah Su PTA Physical Therapist Assistant PT     06/15/23 -  Isatu Johnson RN Registered Nurse Nurse    SK 10/10/23 -  Nessa Barajas PT Student PT Student PT                  PT Recommendation and Plan     Plan of Care Reviewed With: patient  Progress: improving  Outcome Evaluation: Pt tolerated treatment well this date. Required SBA for bed mobility, then to stand as well. Pt ambulated 40ft w/ Rw and SBA-CGA. Also completed a few seated LE exercises. Nsg aide was present at end of session to bandage pt's foot where pt was bleeding after picking at it.     Time Calculation:         PT Charges       Row Name 11/16/23 1525             Time Calculation    Start Time 1352  -      Stop Time 1415  -      Time Calculation (min) 23 min  -      PT Received On 11/16/23  -      PT - Next Appointment 11/17/23  -                User Key  (r) = Recorded By, (t) = Taken By, (c) = Cosigned By      Initials Name Provider Type     Sarah Su PTA Physical Therapist Assistant                  Therapy Charges for Today       Code Description Service Date Service Provider Modifiers Qty    69333269473 HC GAIT TRAINING EA 15 MIN 11/15/2023 Sarah Su PTA GP 1    56739038882 HC GAIT TRAINING EA 15 MIN  11/16/2023 Sarah Su, PTA GP 1    14301794174 HC PT THER PROC EA 15 MIN 11/16/2023 Sarah Su PTA GP 1            PT G-Codes  Outcome Measure Options: AM-PAC 6 Clicks Basic Mobility (PT)  AM-PAC 6 Clicks Score (PT): 17  AM-PAC 6 Clicks Score (OT): 15  PT Discharge Summary  Anticipated Discharge Disposition (PT): skilled nursing facility    Sarah Su PTA  11/16/2023     Well-developed, well nourished

## 2023-11-16 NOTE — PROGRESS NOTES
Name: Catherine Boyer ADMIT: 2023   : 1940  PCP: Kristi Moreau APRN    MRN: [id number] LOS: 14 days   AGE/SEX: 83 y.o. female  ROOM: [facility]     Subjective   Subjective   Resting in bed on her side. No apparent distress. Awaiting rehab placement. WBC elevated but consistent with myeloproliferative disorder.   Patient reported episode of chest pain this morning, EKG and troponins were stable from previous episodes.  Family thinks anxiety may be playing a component and asking for something for anxiety.     Objective   Objective   Vital Signs  Temp:  [97.3 °F (36.3 °C)-99.1 °F (37.3 °C)] 97.5 °F (36.4 °C)  Heart Rate:  [] 96  Resp:  [18-20] 18  BP: (104-143)/(48-94) 108/54  SpO2:  [89 %-99 %] 97 %  on  Flow (L/min):  [2-5] 4;   Device (Oxygen Therapy): nasal cannula  Body mass index is 24.23 kg/m².  Physical Exam  Vitals and nursing note reviewed.   Constitutional:       General: She is not in acute distress.     Comments: Elderly, chronically ill-appearing   Cardiovascular:      Rate and Rhythm: Normal rate and regular rhythm.      Pulses: Normal pulses.      Heart sounds: Normal heart sounds.   Pulmonary:      Effort: Pulmonary effort is normal. No respiratory distress.      Breath sounds: Normal breath sounds. No wheezing.   Abdominal:      General: Bowel sounds are normal.      Palpations: Abdomen is soft.      Tenderness: There is no abdominal tenderness.   Skin:     General: Skin is warm and dry.      Coloration: Skin is pale.       Results Review     I reviewed the patient's new clinical results.  Results from last 7 days   Lab Units 23  0547 11/15/23  0606 23  0623  0541   WBC 10*3/mm3 58.29* 55.98* 72.56* 59.77*   HEMOGLOBIN g/dL 8.2* 8.1* 8.4* 7.8*   PLATELETS 10*3/mm3 197 201 203 201     Results from last 7 days   Lab Units 23  0547 11/15/23  2252 11/15/23  0606 23  0620 236 23  0541   SODIUM mmol/L 138  --  139 139  --  140   POTASSIUM  "mmol/L 4.8 5.0 4.1 4.7   < > 3.1*   CHLORIDE mmol/L 97*  --  97* 100  --  100   CO2 mmol/L 29.2*  --  29.6* 26.8  --  28.5   BUN mg/dL 20  --  18 13  --  13   CREATININE mg/dL 0.92  --  0.79 0.89  --  0.78   GLUCOSE mg/dL 177*  --  127* 92  --  103*   EGFR mL/min/1.73 61.1  --  73.4 63.6  --  74.5    < > = values in this interval not displayed.     Results from last 7 days   Lab Units 11/16/23  0547 11/15/23  0606 11/14/23  0620 11/13/23  0541   ALBUMIN g/dL 3.7 3.9 3.6 3.5     Results from last 7 days   Lab Units 11/16/23  0547 11/15/23  2252 11/15/23  0606 11/14/23  1857 11/14/23  0620 11/13/23  0541 11/12/23  0728 11/11/23  0702   CALCIUM mg/dL 9.3  --  9.2  --  9.1 9.1   < > 9.3   ALBUMIN g/dL 3.7  --  3.9  --  3.6 3.5   < > 3.8   MAGNESIUM mg/dL  --  2.1  --   --   --   --   --  1.6   PHOSPHORUS mg/dL 4.0  --  3.7 3.9 2.2* 2.6   < > 2.6    < > = values in this interval not displayed.           No results found for: \"HGBA1C\", \"POCGLU\"    No radiology results for the last day    I have personally reviewed all medications:  Scheduled Medications  apixaban, 5 mg, Oral, Q12H  aspirin, 81 mg, Oral, Daily  famotidine, 20 mg, Oral, Daily  furosemide, 10 mg, Oral, Daily  ipratropium-albuterol, 3 mL, Nebulization, Q8H - RT  latanoprost, 1 drop, Both Eyes, Nightly  lidocaine, 20 mL, Infiltration, Once  methylPREDNISolone sodium succinate, 40 mg, Intravenous, Q12H  metoprolol succinate XL, 25 mg, Oral, Daily  Momelotinib Dihydrochloride, 200 mg, Oral, Daily  saccharomyces boulardii, 250 mg, Oral, BID  senna-docusate sodium, 2 tablet, Oral, BID  sertraline, 100 mg, Oral, Daily    Infusions   Diet  Diet: Cardiac Diets, Diabetic Diets; Healthy Heart (2-3 Na+); Consistent Carbohydrate; Texture: Regular Texture (IDDSI 7); Fluid Consistency: Thin (IDDSI 0)    I have personally reviewed:  [x]  Laboratory   []  Microbiology   []  Radiology   [x]  EKG/Telemetry  []  Cardiology/Vascular   []  Pathology    []  Records     "   Assessment/Plan     Active Hospital Problems    Diagnosis  POA    **Acute kidney injury [N17.9]  Yes    Iatrogenic pneumothorax [J95.811]  No    Pulmonary infiltrate [R91.8]  Unknown    Myelofibrosis [D75.81]  Yes    Personal history of transient ischemic attack (TIA), 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       83 y.o. female admitted with Acute kidney injury.    Acute Cystitis without Hematuria  - Urine culture grew ESBL.  - Completed ertapenem     KARLI  - Resolved with IVF  - Order repeat BMP in AM for reassessment.  - Appreciate nephrology recs- lasix restarted     Lung mass  - Indeterminate, lingular on imaging. s/p bronchoscopy 11/8/23 with biopsies, pathology showing no evidence of malignancy but only 3 biopsies (as opposed to 6-10) were obtained due to bleeding  - has small iatrogenic pneumothorax, improved with conservative treatment  - follow up chest CT scan without contrast in 6-8 weeks  - appreciate pulmonology recs, they have signed off-follow up with [facility] for Dr. Billings, in 8 weeks     Aortic Stenosis/Chronic Diastolic CHF  Coronary artery disease  Paroxysmal atrial fibrillation  Hypertension  - Continue Eliquis, lasix, metoprolol  - no plavix at DC- cards rec resuming ASA 81mg- started 11/15   -With chest pain this morning, troponin is stable/consistent with previous troponin, EKG with sinus tach, no ischemic changes noted.       Myeloproliferative disorder/Anemia  Functional iron deficiency  Leukocytosis  - Has chronic leukocytosis, values elevated on most recent labs, however, relatively stable from prior.  -  s/p 1 unit of PRBCs 11/6/23 and 11/7/23 with good response; also received ferrelicit  - oncology evaluated, o/p f/u planned- signed off  - daily cbc    Anxiety  -Start low-dose Ativan while inpatient 0.25mg q6h prn    SCDs for DVT prophylaxis.  Full code.  Discussed with nursing staff.  Anticipate discharge to SNU facility once arrangements have been made.      Chandni Luo MD  [facility]  11/16/23

## 2023-11-16 NOTE — PROGRESS NOTES
Discharge Planning Assessment  [facility]     Patient Name: Catherine Boyer  MRN: [id number]  Today's Date: 11/16/2023    Admit Date: 11/2/2023    Plan: Skilled rehab pending acceptance and [employer] Medicare precert   Discharge Needs Assessment    No documentation.                  Discharge Plan       Row Name 11/16/23 1659       Plan    Plan Skilled rehab pending acceptance and [employer] Medicare precert    Plan Comments Spoke with lucy at bedside and informed her that [facility] at both locations do not have any beds and [facility] has also declined. Provided skilled rehab choices from Medicare.gov. Daughter was able to choose [facility]. [employer] precert will be needed. Hansel ZENG                  Continued Care and Services - Admitted Since 11/2/2023       Destination       Service Provider Request Status Selected Services Address Phone Fax Patient Preferred    [facility] [place] Pending - Request Sent N/A [address] [phone] --    [facility] Pending - Request Sent N/A [address] [facility] [address] [phone] --    [facility] Pending - Request Sent N/A [address] [facility] [phone] --    [facility] Pending - Request Sent N/A [address] [facility] [address] [phone] --    [facility] Declined  Bed not available N/A [address] [facility] [address] [phone] --    [facility] Declined  Bed not available N/A [address] [facility] [address] [phone] --    [facility] Declined  Bed not available N/A [address] [facility] [address] [phone] --    [facility] Declined  Patient Insurance Not Accepted N/A [address] [facility] [address] [phone] --              Home Medical Care        Service Provider Request Status Selected Services Address Phone Fax Patient Preferred    [facility] [address], [address], [place] [address] [phone] --                  Selected Continued Care - Episodes Includes continued care and service providers with selected services from the active episodes listed below      Oncology- External Fill Episode start date: 10/10/2023   There are no active outsourced providers for this episode.                 Selected Continued Care - Prior Encounters Includes continued care and service providers with selected services from prior encounters from 8/4/2023 to 11/16/2023      Discharged on 10/2/2023 Admission date: 9/27/2023 - Discharge disposition: Home-Health Care Svc      Home Medical Care       Service Provider Selected Services Address Phone Fax Patient Preferred    [facility] [address], [address], [place] [address] [phone] --                          Expected Discharge Date and Time       Expected Discharge Date Expected Discharge Time    Nov 17, 2023            Demographic Summary    No documentation.                  Functional Status    No documentation.                  Psychosocial    No documentation.                  Abuse/Neglect    No documentation.                  Legal    No documentation.                  Substance Abuse    No documentation.                  Patient Forms    No documentation.                     Neema Parsons, RN

## 2023-11-16 NOTE — PLAN OF CARE
Goal Outcome Evaluation:  Plan of Care Reviewed With: patient        Progress: improving  Outcome Evaluation: Pt tolerated treatment well this date. Required SBA for bed mobility, then to stand as well. Pt ambulated 40ft w/ Rw and SBA-CGA. Also completed a few seated LE exercises. Nsg aide was present at end of session to bandage pt's foot where pt was bleeding after picking at it.      Anticipated Discharge Disposition (PT): skilled nursing facility

## 2023-11-16 NOTE — DISCHARGE PLACEMENT REQUEST
"Maribell Boyer (83 y.o. Female)       Date of Birth   1940    Social Security Number       Address   [facility] [address]    Home Phone   [phone]    MRN   [id number]       Alevism   Unknown    Marital Status                               Admission Date   11/2/23    Admission Type   Emergency    Admitting Provider   Kenton Meeks MD    Attending Provider   Chandni Luo MD    Department, Room/Bed   [address], [address]/1       Discharge Date       Discharge Disposition       Discharge Destination                                 Attending Provider: Chandni Luo MD    Allergies: Aspirin, Diphenhydramine Hcl, Hydroxyurea, Oxycodone    Isolation: Contact   Infection: ESBL E coli (11/05/23)   Code Status: CPR    Ht: 162 cm (63.78\")   Wt: 63.6 kg (140 lb 3.4 oz)    Admission Cmt: None   Principal Problem: Acute kidney injury [N17.9]                   Active Insurance as of 11/2/2023       Primary Coverage       Payor Plan Insurance Group Employer/Plan Group    AETNA MEDICARE REPLACEMENT AETNA MEDICARE REPLACEMENT [id number]       Payor Plan Address Payor Plan Phone Number Payor Plan Fax Number Effective Dates    [address] [phone]  1/1/2022 - None Entered    [facility] [id number]         Subscriber Name Subscriber Birth Date Member ID       MARIBELL BOYER 1940 [id number]                     Emergency Contacts        (Rel.) Home Phone Work Phone Mobile Phone    RAY BOYER (Daughter) [phone] -- [phone]    Karan Boyer (2ndPOA) (Son) -- -- [phone]    Karen Gutierrez (1stPOA) (Daughter) -- -- [phone]    Greg Boyer (Son) -- -- [phone]    MEG BANKS (Brother) -- -- [phone]                "

## 2023-11-16 NOTE — PROGRESS NOTES
"Nutrition Services    Patient Name:  Catherine Boyer  YOB: 1940  MRN: [id number]  Admit Date:  11/2/2023    Assessment Date:  11/16/23    Summary: Follow up     Nutrition follow up completed. Pt was sleeping at time of attempted visit. Ate 25% of two meals per EMR. Eating some and drinking well per nephrology note. Awaiting rehab placement.   Labs: Cl 97, Glu 177    REC:  Will continue to encourage and monitor PO/ONS intake     RD to follow per protocol.     CLINICAL NUTRITION ASSESSMENT      Reason for Assessment Follow-up Protocol     Diagnosis/Problem   KARLI     Anthropometrics        Current Height  Current Weight  BMI kg/m2 Height: 162 cm (63.78\")  Weight: 63.6 kg (140 lb 3.4 oz) (11/16/23 0548)  Body mass index is 24.23 kg/m².   Adjusted BMI (if applicable)    BMI Category Overweight (25 - 29.9)   Ideal Body Weight (IBW) 119 lb (54.2 kg)   Usual Body Weight (UBW) Unsure   Weight Trend Other: fluctuations, possible gain noted       --  Estimated/Assessed Needs        Current Weight  Weight: 63.6 kg (140 lb 3.4 oz) (11/16/23 0548)       Energy Requirements    Weight for Calculation 149 lb (67.6 kg)   Method for Estimation  30-35 kcal/kg   EST Needs (kcal/day) 5027-1239       Protein Requirements    Weight for Calculation 149 lb (67.6 kg)   EST Protein Needs (g/kg) 1.2 - 1.5 gm/kg   EST Daily Needs (g/day)        Fluid Requirements     Method for Estimation 1 mL/kcal    EST Needs (mL/day)      Labs       Pertinent Labs    Results from last 7 days   Lab Units 11/16/23  0547 11/15/23  2252 11/15/23  0606 11/14/23  0620   SODIUM mmol/L 138  --  139 139   POTASSIUM mmol/L 4.8 5.0 4.1 4.7   CHLORIDE mmol/L 97*  --  97* 100   CO2 mmol/L 29.2*  --  29.6* 26.8   BUN mg/dL 20  --  18 13   CREATININE mg/dL 0.92  --  0.79 0.89   CALCIUM mg/dL 9.3  --  9.2 9.1   GLUCOSE mg/dL 177*  --  127* 92     Results from last 7 days   Lab Units 11/16/23 0547 11/15/23  2252 11/12/23  0728 11/11/23  0702   MAGNESIUM " mg/dL  --  2.1  --  1.6   PHOSPHORUS mg/dL 4.0  --    < > 2.6   HEMOGLOBIN g/dL 8.2*  --    < > 8.2*   HEMATOCRIT % 26.5*  --    < > 25.9*   WBC 10*3/mm3 58.29*  --    < > 64.69*   ALBUMIN g/dL 3.7  --    < > 3.8    < > = values in this interval not displayed.     Results from last 7 days   Lab Units 11/16/23  0547 11/15/23  0606 11/14/23  0620 11/13/23  0541 11/12/23  0728   PLATELETS 10*3/mm3 197 201 203 201 224     COVID19   Date Value Ref Range Status   11/08/2023 Not Detected Not Detected - Ref. Range Final     Lab Results   Component Value Date    HGBA1C 6.60 (H) 09/29/2023          Medications           Scheduled Medications apixaban, 5 mg, Oral, Q12H  aspirin, 81 mg, Oral, Daily  famotidine, 20 mg, Oral, Daily  furosemide, 10 mg, Oral, Daily  ipratropium-albuterol, 3 mL, Nebulization, Q8H - RT  latanoprost, 1 drop, Both Eyes, Nightly  lidocaine, 20 mL, Infiltration, Once  methylPREDNISolone sodium succinate, 40 mg, Intravenous, Q12H  metoprolol succinate XL, 25 mg, Oral, Daily  Momelotinib Dihydrochloride, 200 mg, Oral, Daily  saccharomyces boulardii, 250 mg, Oral, BID  senna-docusate sodium, 2 tablet, Oral, BID  sertraline, 100 mg, Oral, Daily       Infusions     PRN Medications   acetaminophen    senna-docusate sodium **AND** polyethylene glycol **AND** bisacodyl **AND** bisacodyl    HYDROcodone Bit-Homatrop MBr    HYDROcodone-acetaminophen    LORazepam    melatonin    nitroglycerin    ondansetron **OR** ondansetron    Phosphorus Replacement - Follow Nurse / BPA Driven Protocol    Potassium Replacement - Follow Nurse / BPA Driven Protocol    simethicone    Insert Peripheral IV **AND** sodium chloride     Physical Findings          General Findings alert, overweight, on oxygen therapy   Oral/Mouth Cavity tooth or teeth missing   Edema  no edema   Gastrointestinal diarrhea, fecal incontinence, non-distended , last bowel movement: 11/14   Skin  bruising, excoriation, MASD, pressure injury: st II L leg, st II  L 4th toe, location of wound: L medial plantar wound, bilateral abdomen wound   Tubes/Drains/Lines none   NFPE Not indicated at this time   --  Current Nutrition Orders & Evaluation of Intake       Oral Nutrition     Food Allergies NKFA   Current PO Diet Diet: Cardiac Diets, Diabetic Diets; Healthy Heart (2-3 Na+); Consistent Carbohydrate; Texture: Regular Texture (IDDSI 7); Fluid Consistency: Thin (IDDSI 0)   Supplement Boost Glucose Control, BID   PO Evaluation     % PO Intake 25% x 2 meals    Factors Affecting Intake: diarrhea   --  PES STATEMENT / NUTRITION DIAGNOSIS      Nutrition Dx Problem  Problem: Increased Nutrient Needs  Etiology: Medical Diagnosis - pressure injuries    Signs/Symptoms: Report/Observation     NUTRITION INTERVENTION / PLAN OF CARE      Intervention Goal(s) Maintain nutrition status, Reduce/improve symptoms, Meet estimated needs, Disease management/therapy, Increase intake, and No significant weight loss         RD Intervention/Action Encourage intake, Continue to monitor, and Care plan reviewed   --      Prescription/Orders:       PO Diet       Supplements Boost GC daily with breakfast       Enteral Nutrition       Parenteral Nutrition    New Prescription Ordered? Continue same per protocol   --      Monitor/Evaluation Per protocol, PO intake, Supplement intake, Pertinent labs, Skin status, Symptoms   Discharge Plan/Needs Pending clinical course   --    RD to follow per protocol.      Electronically signed by:  Harriett Simms Dietitian Intern   11/16/23 11:35 EST

## 2023-11-16 NOTE — PROGRESS NOTES
LOS: 14 days     Chief Complaint/ Reason for encounter: KARLI    Subjective   11/06/23 : Patient resting with family at bedside able provide some history  She is doing well overall no new complaints  Weight up today but she is not edematous, not short of breath  She is on 3 L oxygen which is near her home rate  Eating some, drinking well according to family    11/7: No new complaints, bronchoscopy with biopsy scheduled for today  Oral intake remains very low, no shortness of breath chest pain or edema    11/8: Events noted, patient developed a right pneumothorax after her bronchoscopy today.  Higher than normal bleeding during the procedure noted, CT-guided chest tube fortunately not needed  She has been intermittently hypotensive, currently 89/48, O2 requirements about the same, 3 L    11/9: She doing a little better today, fortunately did not need a chest tube  Oxygen requirements near baseline of around 3 L    11/10 family room, says her appetite is low and that she is tired, bp near goal     11/11: Resting, no new complaints or events, no shortness of breath or chest pain overnight  Good appetite    11/12: She looks and feels well denies complaints, still with very poor appetite no dyspnea    11/13: Oral intake a little better per patient family and nursing, no nausea vomiting  No shortness of breath chest pain or edema    11/14: no acute events. Feeling ok. Denies sob or chest pain     11/15 no acute events. She is feeling well, no new complaints.     11/16: Intermittent chest pain, currently resting and denies any new complaints  Oxygen requirements remain near her home rate, currently 2 L      Medical history reviewed:  History of Present Illness    Subjective    History taken from: Patient and chart    Vital Signs  Temp:  [97.5 °F (36.4 °C)-99.1 °F (37.3 °C)] 98.1 °F (36.7 °C)  Heart Rate:  [] 93  Resp:  [16-20] 16  BP: ()/(48-94) 95/51       Wt Readings from Last 1 Encounters:   11/16/23 0570  "63.6 kg (140 lb 3.4 oz)   11/15/23 0600 63.7 kg (140 lb 6.9 oz)   11/14/23 0600 63.2 kg (139 lb 5.3 oz)   11/13/23 0600 66.4 kg (146 lb 6.2 oz)   11/12/23 0509 66.7 kg (147 lb)   11/11/23 0545 67.1 kg (147 lb 14.9 oz)   11/10/23 0600 67.8 kg (149 lb 7.6 oz)   11/09/23 0600 67.7 kg (149 lb 4 oz)   11/08/23 0428 67.7 kg (149 lb 4 oz)   11/07/23 0600 67.6 kg (149 lb 0.5 oz)   11/06/23 0605 67.6 kg (149 lb 0.5 oz)   11/05/23 0507 62.6 kg (138 lb)   11/04/23 0539 62.6 kg (138 lb)   11/03/23 0556 63 kg (138 lb 14.2 oz)       Objective:  Vital signs: (most recent): Blood pressure 95/51, pulse 93, temperature 98.1 °F (36.7 °C), temperature source Oral, resp. rate 16, height 162 cm (63.78\"), weight 63.6 kg (140 lb 3.4 oz), SpO2 91%.                Objective:  General Appearance:  Comfortable, well-appearing, in no acute distress and not in pain.  Awake, alert, oriented. Eating lunch   HEENT: Mucous membranes moist, no injury, oropharynx clear  Lungs:  Normal effort and normal respiratory rate.  Breath sounds clear to auscultation.  No  respiratory distress.  No rales, decreased breath sounds or rhonchi.    Heart: Normal rate.  Regular rhythm.  S1, S2 normal.  No murmur.   Abdomen: Abdomen is soft.  Bowel sounds are normal, no abdominal tenderness.  There is no rebound or guarding  Extremities: no edema of bilateral lower extremities  Neurological: No focal motor or sensory deficits, pupils reactive  Skin:  Warm and dry.  No rash or cyanosis.       Results Review:    Intake/Output:     Intake/Output Summary (Last 24 hours) at 11/16/2023 1504  Last data filed at 11/16/2023 1200  Gross per 24 hour   Intake 480 ml   Output 750 ml   Net -270 ml         DATA:  Radiology and Labs:  The following labs independently reviewed by me. Additional labs ordered for tomorrow a.m.  Interval notes, chart personally reviewed by me.   Old records independently reviewed showing normal baseline creatinine, history of CHF  Discussed with patient's " family at bedside    Risk/ complexity of medical care/ medical decision making moderate risk, KARLI, fluid and electrolyte management    Labs:   Recent Results (from the past 24 hour(s))   ECG 12 Lead Chest Pain    Collection Time: 11/15/23 10:35 PM   Result Value Ref Range    QT Interval 353 ms    QTC Interval 501 ms   High Sensitivity Troponin T    Collection Time: 11/15/23 10:52 PM    Specimen: Blood   Result Value Ref Range    HS Troponin T 33 (H) <14 ng/L   Potassium    Collection Time: 11/15/23 10:52 PM    Specimen: Blood   Result Value Ref Range    Potassium 5.0 3.5 - 5.2 mmol/L   Magnesium    Collection Time: 11/15/23 10:52 PM    Specimen: Blood   Result Value Ref Range    Magnesium 2.1 1.6 - 2.4 mg/dL   High Sensitivity Troponin T 2Hr    Collection Time: 11/15/23 11:49 PM    Specimen: Blood   Result Value Ref Range    HS Troponin T 33 (H) <14 ng/L    Troponin T Delta 0 >=-4 - <+4 ng/L   Renal Function Panel    Collection Time: 11/16/23  5:47 AM    Specimen: Blood   Result Value Ref Range    Glucose 177 (H) 65 - 99 mg/dL    BUN 20 8 - 23 mg/dL    Creatinine 0.92 0.57 - 1.00 mg/dL    Sodium 138 136 - 145 mmol/L    Potassium 4.8 3.5 - 5.2 mmol/L    Chloride 97 (L) 98 - 107 mmol/L    CO2 29.2 (H) 22.0 - 29.0 mmol/L    Calcium 9.3 8.6 - 10.5 mg/dL    Albumin 3.7 3.5 - 5.2 g/dL    Phosphorus 4.0 2.5 - 4.5 mg/dL    Anion Gap 11.8 5.0 - 15.0 mmol/L    BUN/Creatinine Ratio 21.7 7.0 - 25.0    eGFR 61.1 >60.0 mL/min/1.73   CBC Auto Differential    Collection Time: 11/16/23  5:47 AM    Specimen: Blood   Result Value Ref Range    WBC 58.29 (C) 3.40 - 10.80 10*3/mm3    RBC 2.80 (L) 3.77 - 5.28 10*6/mm3    Hemoglobin 8.2 (L) 12.0 - 15.9 g/dL    Hematocrit 26.5 (L) 34.0 - 46.6 %    MCV 94.6 79.0 - 97.0 fL    MCH 29.3 26.6 - 33.0 pg    MCHC 30.9 (L) 31.5 - 35.7 g/dL    RDW 17.4 (H) 12.3 - 15.4 %    RDW-SD 60.1 (H) 37.0 - 54.0 fl    MPV 11.7 6.0 - 12.0 fL    Platelets 197 140 - 450 10*3/mm3   Manual Differential    Collection  Time: 11/16/23  5:47 AM    Specimen: Blood   Result Value Ref Range    Neutrophil % 80.0 (H) 42.7 - 76.0 %    Lymphocyte % 6.0 (L) 19.6 - 45.3 %    Monocyte % 9.0 5.0 - 12.0 %    Basophil % 2.0 (H) 0.0 - 1.5 %    Metamyelocyte % 2.0 (H) 0.0 - 0.0 %    Myelocyte % 1.0 (H) 0.0 - 0.0 %    Neutrophils Absolute 46.63 (H) 1.70 - 7.00 10*3/mm3    Lymphocytes Absolute 3.50 (H) 0.70 - 3.10 10*3/mm3    Monocytes Absolute 5.25 (H) 0.10 - 0.90 10*3/mm3    Basophils Absolute 1.17 (H) 0.00 - 0.20 10*3/mm3    RBC Morphology Normal Normal    WBC Morphology Normal Normal    Platelet Morphology Normal Normal       Radiology:  Pertinent radiology studies were reviewed as described above      Medications have been reviewed separately in chart overview    Narrative & Impression   Portable chest radiograph     HISTORY: Pneumothorax     TECHNIQUE: Single AP portable radiograph of the chest     COMPARISON: Chest radiograph 11/8/2023     IMPRESSION:  FINDINGS AND IMPRESSION:  Previously seen right pneumothorax has decreased in size resulting in  approximately 1.6 cm in pleural-parenchymal separation (previously 2.8  cm). There is moderate pulmonary opacification throughout bilateral  lungs, right greater than left, suggestive of multifocal pneumonia  and/or pulmonary edema in the appropriate clinical context and  correlation with patient history is recommended with follow-up chest CT  if clinically indicated. Given the somewhat masslike opacification  overlying the right lung, at least continued attention on follow-up  chest radiograph in 4 to 6 weeks is recommended to ensure appropriate  evolution/resolution and exclude any possibility of neoplasm.            ASSESSMENT:  Acute kidney injury, prerenal, rapidly improving and near baseline  Chronic diastolic CHF - EF 55%   Nausea vomiting and dehydration, better  Left lingular lung mass  Myeloproliferative disorder, hematology now following  Chronic leukocytosis  Atrial  fibrillation  Diarrhea, slowly resolving  Acute on chronic anemia  Diabetes mellitus  UTI on ertapenem, ESBL E. coli  Lung mass, biopsy 11/8  Postop pneumothorax, had improved by the time chest tube was to be placed      DISCUSSION/PLAN:   Renal function, volume and electrolytes remain at goal  Oral intake seems to be improving  Continue current, low-dose oral diuretic dose for now  Phos  at goal today, potassium 4.8 and on the higher side of normal.  We will monitor     Continue to monitor electrolytes and volume closely  Follow-up a.m. labs  Discharge planning      Darrell Hanley MD  [facility]   Office phone number: [phone]  Answering service phone number: [phone]    11/16/23  15:04 EST

## 2023-11-16 NOTE — PLAN OF CARE
Goal Outcome Evaluation:  Plan of Care Reviewed With: patient        Progress: no change  Outcome Evaluation: Patient c/o CP overnight that was relieved after one round of Nitroglycerin given. VSS. Potassium and Mag ok. Trop 33, repeate also 33. LHA made aware of CP and troponin levels. After nitro given, patient able to sleep remaining of the night with no further issues or complaints. Patient on 4L oxygen. Labs for the morning.      Problem: Adult Inpatient Plan of Care  Goal: Plan of Care Review  Outcome: Ongoing, Progressing  Flowsheets (Taken 11/16/2023 0444)  Progress: no change  Plan of Care Reviewed With: patient  Outcome Evaluation: Patient c/o CP overnight that was relieved after one round of Nitroglycerin given. VSS. Potassium and Mag ok. Trop 33, repeate also 33. LHA made aware of CP and troponin levels. After nitro given, patient able to sleep remaining of the night with no further issues or complaints. Patient on 4L oxygen. Labs for the morning.  Goal: Patient-Specific Goal (Individualized)  Outcome: Ongoing, Progressing  Goal: Absence of Hospital-Acquired Illness or Injury  Outcome: Ongoing, Progressing  Intervention: Identify and Manage Fall Risk  Recent Flowsheet Documentation  Taken 11/16/2023 0425 by Willow Valles RN  Safety Promotion/Fall Prevention:   activity supervised   assistive device/personal items within reach   clutter free environment maintained   fall prevention program maintained   nonskid shoes/slippers when out of bed   room organization consistent   safety round/check completed  Taken 11/16/2023 0209 by Willow Valles RN  Safety Promotion/Fall Prevention:   activity supervised   assistive device/personal items within reach   clutter free environment maintained   fall prevention program maintained   nonskid shoes/slippers when out of bed   room organization consistent   safety round/check completed  Taken 11/16/2023 0035 by Willow Valles RN  Safety Promotion/Fall Prevention:   activity  supervised   assistive device/personal items within reach   clutter free environment maintained   fall prevention program maintained   nonskid shoes/slippers when out of bed   room organization consistent   safety round/check completed  Taken 11/15/2023 2012 by Willow Valles RN  Safety Promotion/Fall Prevention:   activity supervised   assistive device/personal items within reach   clutter free environment maintained   fall prevention program maintained   nonskid shoes/slippers when out of bed   room organization consistent   safety round/check completed  Intervention: Prevent Skin Injury  Recent Flowsheet Documentation  Taken 11/16/2023 0425 by Willow Valles RN  Body Position:   position changed independently   supine  Taken 11/16/2023 0209 by Willow Valles RN  Body Position:   right   side-lying  Taken 11/16/2023 0035 by Willow Valles RN  Body Position: supine  Skin Protection:   adhesive use limited   incontinence pads utilized  Taken 11/15/2023 2012 by Willow Valles RN  Body Position:   position changed independently   left   side-lying  Skin Protection:   adhesive use limited   incontinence pads utilized  Intervention: Prevent and Manage VTE (Venous Thromboembolism) Risk  Recent Flowsheet Documentation  Taken 11/16/2023 0035 by Willow Valles RN  Activity Management: activity encouraged  Taken 11/15/2023 2012 by Willow Valles RN  Activity Management: activity encouraged  Goal: Optimal Comfort and Wellbeing  Outcome: Ongoing, Progressing  Intervention: Monitor Pain and Promote Comfort  Recent Flowsheet Documentation  Taken 11/15/2023 2012 by Willow Valles RN  Pain Management Interventions: see MAR  Intervention: Provide Person-Centered Care  Recent Flowsheet Documentation  Taken 11/16/2023 0035 by Willow Valles RN  Trust Relationship/Rapport:   care explained   choices provided   questions answered   empathic listening provided   reassurance provided   questions encouraged   thoughts/feelings acknowledged  Taken  11/15/2023 2012 by Willow Valles RN  Trust Relationship/Rapport:   care explained   choices provided   empathic listening provided   questions answered   questions encouraged   reassurance provided   thoughts/feelings acknowledged  Goal: Readiness for Transition of Care  Outcome: Ongoing, Progressing     Problem: Chest Pain  Goal: Resolution of Chest Pain Symptoms  Outcome: Ongoing, Progressing  Intervention: Manage Acute Chest Pain  Recent Flowsheet Documentation  Taken 11/15/2023 2229 by Willow Valles RN  Chest Pain Intervention:   nitroglycerin SL given   oxygen applied   12-lead ECG obtained   cardiac biomarkers drawn   cardiac monitoring continued

## 2023-11-16 NOTE — PROGRESS NOTES
CC:  Anemia  Melofibrosis    Subjective  Patient states she's feeling better.    Objective:    Physical Exam:  Vitals stable    She's awake, alert, and talkative on 4L NC    WBC and HGB stable    Assessment/Plan    Myelofibrosis:  Continue Ojjaara (momelotinib) for myelofibrosis    Anemia:  Hgb stable  No transfusion needed    Follow up with me scheduled on 11/20 but this can be delayed if needed    Will continue to follow peripherally.

## 2023-11-17 LAB
ALBUMIN SERPL-MCNC: 3.8 G/DL (ref 3.5–5.2)
ANION GAP SERPL CALCULATED.3IONS-SCNC: 9 MMOL/L (ref 5–15)
BUN SERPL-MCNC: 19 MG/DL (ref 8–23)
BUN/CREAT SERPL: 21.1 (ref 7–25)
CALCIUM SPEC-SCNC: 9.2 MG/DL (ref 8.6–10.5)
CHLORIDE SERPL-SCNC: 98 MMOL/L (ref 98–107)
CO2 SERPL-SCNC: 32 MMOL/L (ref 22–29)
CREAT SERPL-MCNC: 0.9 MG/DL (ref 0.57–1)
DEPRECATED RDW RBC AUTO: 60 FL (ref 37–54)
EGFRCR SERPLBLD CKD-EPI 2021: 62.8 ML/MIN/1.73
EOSINOPHIL # BLD MANUAL: 0.42 10*3/MM3 (ref 0–0.4)
EOSINOPHIL NFR BLD MANUAL: 1 % (ref 0.3–6.2)
ERYTHROCYTE [DISTWIDTH] IN BLOOD BY AUTOMATED COUNT: 17.7 % (ref 12.3–15.4)
GLUCOSE SERPL-MCNC: 102 MG/DL (ref 65–99)
HCT VFR BLD AUTO: 25.5 % (ref 34–46.6)
HGB BLD-MCNC: 7.8 G/DL (ref 12–15.9)
LYMPHOCYTES # BLD MANUAL: 3.32 10*3/MM3 (ref 0.7–3.1)
LYMPHOCYTES NFR BLD MANUAL: 11 % (ref 5–12)
MCH RBC QN AUTO: 28.8 PG (ref 26.6–33)
MCHC RBC AUTO-ENTMCNC: 30.6 G/DL (ref 31.5–35.7)
MCV RBC AUTO: 94.1 FL (ref 79–97)
METAMYELOCYTES NFR BLD MANUAL: 2 % (ref 0–0)
MONOCYTES # BLD: 4.57 10*3/MM3 (ref 0.1–0.9)
MYELOCYTES NFR BLD MANUAL: 1 % (ref 0–0)
NEUTROPHILS # BLD AUTO: 31.99 10*3/MM3 (ref 1.7–7)
NEUTROPHILS NFR BLD MANUAL: 77 % (ref 42.7–76)
PHOSPHATE SERPL-MCNC: 3.1 MG/DL (ref 2.5–4.5)
PLAT MORPH BLD: NORMAL
PLATELET # BLD AUTO: 200 10*3/MM3 (ref 140–450)
PMV BLD AUTO: 11.6 FL (ref 6–12)
POTASSIUM SERPL-SCNC: 4.1 MMOL/L (ref 3.5–5.2)
RBC # BLD AUTO: 2.71 10*6/MM3 (ref 3.77–5.28)
RBC MORPH BLD: NORMAL
SODIUM SERPL-SCNC: 139 MMOL/L (ref 136–145)
VARIANT LYMPHS NFR BLD MANUAL: 8 % (ref 19.6–45.3)
WBC MORPH BLD: NORMAL
WBC NRBC COR # BLD AUTO: 41.55 10*3/MM3 (ref 3.4–10.8)

## 2023-11-17 PROCEDURE — 94799 UNLISTED PULMONARY SVC/PX: CPT

## 2023-11-17 PROCEDURE — 97530 THERAPEUTIC ACTIVITIES: CPT

## 2023-11-17 PROCEDURE — 85025 COMPLETE CBC W/AUTO DIFF WBC: CPT | Performed by: INTERNAL MEDICINE

## 2023-11-17 PROCEDURE — 85007 BL SMEAR W/DIFF WBC COUNT: CPT | Performed by: INTERNAL MEDICINE

## 2023-11-17 PROCEDURE — 63710000001 PREDNISONE PER 5 MG: Performed by: STUDENT IN AN ORGANIZED HEALTH CARE EDUCATION/TRAINING PROGRAM

## 2023-11-17 PROCEDURE — 80069 RENAL FUNCTION PANEL: CPT | Performed by: INTERNAL MEDICINE

## 2023-11-17 PROCEDURE — 94664 DEMO&/EVAL PT USE INHALER: CPT

## 2023-11-17 PROCEDURE — 99231 SBSQ HOSP IP/OBS SF/LOW 25: CPT | Performed by: INTERNAL MEDICINE

## 2023-11-17 RX ORDER — PREDNISONE 20 MG/1
40 TABLET ORAL
Status: DISCONTINUED | OUTPATIENT
Start: 2023-11-20 | End: 2023-11-21

## 2023-11-17 RX ORDER — PREDNISONE 20 MG/1
20 TABLET ORAL
Status: DISCONTINUED | OUTPATIENT
Start: 2023-11-26 | End: 2023-11-21

## 2023-11-17 RX ORDER — PREDNISONE 50 MG/1
50 TABLET ORAL
Status: COMPLETED | OUTPATIENT
Start: 2023-11-17 | End: 2023-11-19

## 2023-11-17 RX ORDER — PREDNISONE 10 MG/1
10 TABLET ORAL
Status: DISCONTINUED | OUTPATIENT
Start: 2023-11-29 | End: 2023-11-21

## 2023-11-17 RX ADMIN — IPRATROPIUM BROMIDE AND ALBUTEROL SULFATE 3 ML: 2.5; .5 SOLUTION RESPIRATORY (INHALATION) at 07:02

## 2023-11-17 RX ADMIN — APIXABAN 5 MG: 5 TABLET, FILM COATED ORAL at 20:31

## 2023-11-17 RX ADMIN — APIXABAN 5 MG: 5 TABLET, FILM COATED ORAL at 11:21

## 2023-11-17 RX ADMIN — FAMOTIDINE 20 MG: 20 TABLET ORAL at 11:21

## 2023-11-17 RX ADMIN — HYDROCODONE BITARTRATE AND HOMATROPINE METHYLBROMIDE 5 ML: 1.5; 5 SYRUP ORAL at 14:18

## 2023-11-17 RX ADMIN — ASPIRIN 81 MG: 81 TABLET, COATED ORAL at 11:21

## 2023-11-17 RX ADMIN — Medication 10 ML: at 20:31

## 2023-11-17 RX ADMIN — Medication 250 MG: at 20:31

## 2023-11-17 RX ADMIN — METOPROLOL SUCCINATE 25 MG: 25 TABLET, EXTENDED RELEASE ORAL at 11:22

## 2023-11-17 RX ADMIN — PREDNISONE 50 MG: 50 TABLET ORAL at 14:18

## 2023-11-17 RX ADMIN — LATANOPROST 1 DROP: 50 SOLUTION/ DROPS OPHTHALMIC at 20:31

## 2023-11-17 RX ADMIN — IPRATROPIUM BROMIDE AND ALBUTEROL SULFATE 3 ML: 2.5; .5 SOLUTION RESPIRATORY (INHALATION) at 15:27

## 2023-11-17 RX ADMIN — Medication 250 MG: at 11:23

## 2023-11-17 RX ADMIN — IPRATROPIUM BROMIDE AND ALBUTEROL SULFATE 3 ML: 2.5; .5 SOLUTION RESPIRATORY (INHALATION) at 20:55

## 2023-11-17 RX ADMIN — FUROSEMIDE 10 MG: 20 TABLET ORAL at 11:21

## 2023-11-17 RX ADMIN — SERTRALINE 100 MG: 100 TABLET, FILM COATED ORAL at 11:22

## 2023-11-17 NOTE — PROGRESS NOTES
LOS: 15 days     Chief Complaint/ Reason for encounter: KARLI    Subjective   11/06/23 : Patient resting with family at bedside able provide some history  She is doing well overall no new complaints  Weight up today but she is not edematous, not short of breath  She is on 3 L oxygen which is near her home rate  Eating some, drinking well according to family    11/7: No new complaints, bronchoscopy with biopsy scheduled for today  Oral intake remains very low, no shortness of breath chest pain or edema    11/8: Events noted, patient developed a right pneumothorax after her bronchoscopy today.  Higher than normal bleeding during the procedure noted, CT-guided chest tube fortunately not needed  She has been intermittently hypotensive, currently 89/48, O2 requirements about the same, 3 L    11/9: She doing a little better today, fortunately did not need a chest tube  Oxygen requirements near baseline of around 3 L    11/10 family room, says her appetite is low and that she is tired, bp near goal     11/11: Resting, no new complaints or events, no shortness of breath or chest pain overnight  Good appetite    11/12: She looks and feels well denies complaints, still with very poor appetite no dyspnea    11/13: Oral intake a little better per patient family and nursing, no nausea vomiting  No shortness of breath chest pain or edema    11/14: no acute events. Feeling ok. Denies sob or chest pain     11/15 no acute events. She is feeling well, no new complaints.     11/16: Intermittent chest pain, currently resting and denies any new complaints  Oxygen requirements remain near her home rate, currently 2 L    11/17: She is doing well denies any new problems or complaints  No shortness of breath chest pain fevers chills or edema    Medical history reviewed:  History of Present Illness    Subjective    History taken from: Patient and chart    Vital Signs  Temp:  [98.1 °F (36.7 °C)-99.1 °F (37.3 °C)] 98.4 °F (36.9 °C)  Heart  "Rate:  [] 118  Resp:  [16-18] 18  BP: ()/(51-67) 126/57       Wt Readings from Last 1 Encounters:   11/16/23 0548 63.6 kg (140 lb 3.4 oz)   11/15/23 0600 63.7 kg (140 lb 6.9 oz)   11/14/23 0600 63.2 kg (139 lb 5.3 oz)   11/13/23 0600 66.4 kg (146 lb 6.2 oz)   11/12/23 0509 66.7 kg (147 lb)   11/11/23 0545 67.1 kg (147 lb 14.9 oz)   11/10/23 0600 67.8 kg (149 lb 7.6 oz)   11/09/23 0600 67.7 kg (149 lb 4 oz)   11/08/23 0428 67.7 kg (149 lb 4 oz)   11/07/23 0600 67.6 kg (149 lb 0.5 oz)   11/06/23 0605 67.6 kg (149 lb 0.5 oz)   11/05/23 0507 62.6 kg (138 lb)   11/04/23 0539 62.6 kg (138 lb)   11/03/23 0556 63 kg (138 lb 14.2 oz)       Objective:  Vital signs: (most recent): Blood pressure 126/57, pulse 118, temperature 98.4 °F (36.9 °C), temperature source Oral, resp. rate 18, height 162 cm (63.78\"), weight 63.6 kg (140 lb 3.4 oz), SpO2 93%.                Objective:  General Appearance:  Comfortable, well-appearing, in no acute distress and not in pain.  Awake, alert, oriented. Eating lunch   HEENT: Mucous membranes moist, no injury, oropharynx clear  Lungs:  Normal effort and normal respiratory rate.  Breath sounds clear to auscultation.  No  respiratory distress.  No rales, decreased breath sounds or rhonchi.    Heart: Normal rate.  Regular rhythm.  S1, S2 normal.  No murmur.   Abdomen: Abdomen is soft.  Bowel sounds are normal, no abdominal tenderness.  There is no rebound or guarding  Extremities: no edema of bilateral lower extremities  Neurological: No focal motor or sensory deficits, pupils reactive  Skin:  Warm and dry.  No rash or cyanosis.       Results Review:    Intake/Output:     Intake/Output Summary (Last 24 hours) at 11/17/2023 1237  Last data filed at 11/16/2023 1700  Gross per 24 hour   Intake 120 ml   Output 600 ml   Net -480 ml         DATA:  Radiology and Labs:  The following labs independently reviewed by me. Additional labs ordered for tomorrow a.m.  Interval notes, chart personally " reviewed by me.   Old records independently reviewed showing normal baseline creatinine, history of CHF  Discussed with patient's family at bedside    Risk/ complexity of medical care/ medical decision making moderate risk, KARLI, fluid and electrolyte management    Labs:   Recent Results (from the past 24 hour(s))   Renal Function Panel    Collection Time: 11/17/23  5:27 AM    Specimen: Blood   Result Value Ref Range    Glucose 102 (H) 65 - 99 mg/dL    BUN 19 8 - 23 mg/dL    Creatinine 0.90 0.57 - 1.00 mg/dL    Sodium 139 136 - 145 mmol/L    Potassium 4.1 3.5 - 5.2 mmol/L    Chloride 98 98 - 107 mmol/L    CO2 32.0 (H) 22.0 - 29.0 mmol/L    Calcium 9.2 8.6 - 10.5 mg/dL    Albumin 3.8 3.5 - 5.2 g/dL    Phosphorus 3.1 2.5 - 4.5 mg/dL    Anion Gap 9.0 5.0 - 15.0 mmol/L    BUN/Creatinine Ratio 21.1 7.0 - 25.0    eGFR 62.8 >60.0 mL/min/1.73   CBC Auto Differential    Collection Time: 11/17/23  5:27 AM    Specimen: Blood   Result Value Ref Range    WBC 41.55 (C) 3.40 - 10.80 10*3/mm3    RBC 2.71 (L) 3.77 - 5.28 10*6/mm3    Hemoglobin 7.8 (L) 12.0 - 15.9 g/dL    Hematocrit 25.5 (L) 34.0 - 46.6 %    MCV 94.1 79.0 - 97.0 fL    MCH 28.8 26.6 - 33.0 pg    MCHC 30.6 (L) 31.5 - 35.7 g/dL    RDW 17.7 (H) 12.3 - 15.4 %    RDW-SD 60.0 (H) 37.0 - 54.0 fl    MPV 11.6 6.0 - 12.0 fL    Platelets 200 140 - 450 10*3/mm3   Manual Differential    Collection Time: 11/17/23  5:27 AM    Specimen: Blood   Result Value Ref Range    Neutrophil % 77.0 (H) 42.7 - 76.0 %    Lymphocyte % 8.0 (L) 19.6 - 45.3 %    Monocyte % 11.0 5.0 - 12.0 %    Eosinophil % 1.0 0.3 - 6.2 %    Metamyelocyte % 2.0 (H) 0.0 - 0.0 %    Myelocyte % 1.0 (H) 0.0 - 0.0 %    Neutrophils Absolute 31.99 (H) 1.70 - 7.00 10*3/mm3    Lymphocytes Absolute 3.32 (H) 0.70 - 3.10 10*3/mm3    Monocytes Absolute 4.57 (H) 0.10 - 0.90 10*3/mm3    Eosinophils Absolute 0.42 (H) 0.00 - 0.40 10*3/mm3    RBC Morphology Normal Normal    WBC Morphology Normal Normal    Platelet Morphology Normal  Normal       Radiology:  Pertinent radiology studies were reviewed as described above      Medications have been reviewed separately in chart overview    Narrative & Impression   Portable chest radiograph     HISTORY: Pneumothorax     TECHNIQUE: Single AP portable radiograph of the chest     COMPARISON: Chest radiograph 11/8/2023     IMPRESSION:  FINDINGS AND IMPRESSION:  Previously seen right pneumothorax has decreased in size resulting in  approximately 1.6 cm in pleural-parenchymal separation (previously 2.8  cm). There is moderate pulmonary opacification throughout bilateral  lungs, right greater than left, suggestive of multifocal pneumonia  and/or pulmonary edema in the appropriate clinical context and  correlation with patient history is recommended with follow-up chest CT  if clinically indicated. Given the somewhat masslike opacification  overlying the right lung, at least continued attention on follow-up  chest radiograph in 4 to 6 weeks is recommended to ensure appropriate  evolution/resolution and exclude any possibility of neoplasm.            ASSESSMENT:  Acute kidney injury, prerenal, rapidly improving and near baseline  Chronic diastolic CHF - EF 55%   Nausea vomiting and dehydration, better  Left lingular lung mass  Myeloproliferative disorder, hematology now following  Chronic leukocytosis  Atrial fibrillation  Diarrhea, slowly resolving  Acute on chronic anemia  Diabetes mellitus  UTI on ertapenem, ESBL E. coli  Lung mass, biopsy 11/8  Postop pneumothorax, had improved by the time chest tube was to be placed      DISCUSSION/PLAN:   Renal function, volume and electrolytes remain at goal  Oral intake seems to be improving  Continue current, low-dose oral diuretic dose for now  BP at goal  Phosphorus, potassium normalizing as well  CO2 a little higher, will monitor  We will plan to maintain current diuretic dose today and at discharge    Follow-up a.m. labs  Discharge planning, okay from renal  standpoint at any time      Darrell Hanley MD  [facility]   Office phone number: [phone]  Answering service phone number: [phone]    11/17/23  12:37 EST

## 2023-11-17 NOTE — PROGRESS NOTES
Name: Catherine Boyer ADMIT: 2023   : 1940  PCP: Kristi Moreau APRN    MRN: [id number] LOS: 15 days   AGE/SEX: 83 y.o. female  ROOM: [facility]     Subjective   Subjective   Resting in bed on her side. Family at bedside. She thinks her breathing is worse today, c/o more coughing.      Objective   Objective   Vital Signs  Temp:  [98.1 °F (36.7 °C)-99.1 °F (37.3 °C)] 98.4 °F (36.9 °C)  Heart Rate:  [] 118  Resp:  [16-18] 18  BP: ()/(51-67) 126/57  SpO2:  [91 %-98 %] 93 %  on  Flow (L/min):  [2] 2;   Device (Oxygen Therapy): nasal cannula  Body mass index is 24.23 kg/m².  Physical Exam  Vitals and nursing note reviewed.   Constitutional:       General: She is not in acute distress.     Comments: Elderly, chronically ill-appearing   Cardiovascular:      Rate and Rhythm: Normal rate and regular rhythm.      Pulses: Normal pulses.      Heart sounds: Normal heart sounds.   Pulmonary:      Effort: Pulmonary effort is normal. No respiratory distress.      Breath sounds: No wheezing.      Comments: Coarse breath sounds  Abdominal:      General: Bowel sounds are normal.      Palpations: Abdomen is soft.      Tenderness: There is no abdominal tenderness.   Skin:     General: Skin is warm and dry.      Coloration: Skin is pale.       Results Review     I reviewed the patient's new clinical results.  Results from last 7 days   Lab Units 23  0547 11/15/23  0606 23  0620   WBC 10*3/mm3 41.55* 58.29* 55.98* 72.56*   HEMOGLOBIN g/dL 7.8* 8.2* 8.1* 8.4*   PLATELETS 10*3/mm3 200 197 201 203     Results from last 7 days   Lab Units 23  0547 11/15/23  2252 11/15/23  0606 23  0620   SODIUM mmol/L 139 138  --  139 139   POTASSIUM mmol/L 4.1 4.8 5.0 4.1 4.7   CHLORIDE mmol/L 98 97*  --  97* 100   CO2 mmol/L 32.0* 29.2*  --  29.6* 26.8   BUN mg/dL 19 20  --  18 13   CREATININE mg/dL 0.90 0.92  --  0.79 0.89   GLUCOSE mg/dL 102* 177*  --  127* 92   EGFR mL/min/1.73  "62.8 61.1  --  73.4 63.6     Results from last 7 days   Lab Units 11/17/23  0527 11/16/23  0547 11/15/23  0606 11/14/23  0620   ALBUMIN g/dL 3.8 3.7 3.9 3.6     Results from last 7 days   Lab Units 11/17/23  0527 11/16/23  0547 11/15/23  2252 11/15/23  0606 11/14/23  1857 11/14/23  0620 11/12/23  0728 11/11/23  0702   CALCIUM mg/dL 9.2 9.3  --  9.2  --  9.1   < > 9.3   ALBUMIN g/dL 3.8 3.7  --  3.9  --  3.6   < > 3.8   MAGNESIUM mg/dL  --   --  2.1  --   --   --   --  1.6   PHOSPHORUS mg/dL 3.1 4.0  --  3.7 3.9 2.2*   < > 2.6    < > = values in this interval not displayed.           No results found for: \"HGBA1C\", \"POCGLU\"    No radiology results for the last day    I have personally reviewed all medications:  Scheduled Medications  apixaban, 5 mg, Oral, Q12H  aspirin, 81 mg, Oral, Daily  famotidine, 20 mg, Oral, Daily  furosemide, 10 mg, Oral, Daily  ipratropium-albuterol, 3 mL, Nebulization, Q8H - RT  latanoprost, 1 drop, Both Eyes, Nightly  lidocaine, 20 mL, Infiltration, Once  metoprolol succinate XL, 25 mg, Oral, Daily  Momelotinib Dihydrochloride, 200 mg, Oral, Daily  predniSONE, 50 mg, Oral, Daily With Breakfast   Followed by  [START ON 11/20/2023] predniSONE, 40 mg, Oral, Daily With Breakfast   Followed by  [START ON 11/23/2023] predniSONE, 30 mg, Oral, Daily With Breakfast   Followed by  [START ON 11/26/2023] predniSONE, 20 mg, Oral, Daily With Breakfast   Followed by  [START ON 11/29/2023] predniSONE, 10 mg, Oral, Daily With Breakfast  saccharomyces boulardii, 250 mg, Oral, BID  senna-docusate sodium, 2 tablet, Oral, BID  sertraline, 100 mg, Oral, Daily    Infusions   Diet  Diet: Cardiac Diets, Diabetic Diets; Healthy Heart (2-3 Na+); Consistent Carbohydrate; Texture: Regular Texture (IDDSI 7); Fluid Consistency: Thin (IDDSI 0)    I have personally reviewed:  [x]  Laboratory   []  Microbiology   []  Radiology   [x]  EKG/Telemetry  []  Cardiology/Vascular   []  Pathology    []  Records       Assessment/Plan "     Active Hospital Problems    Diagnosis  POA    **Acute kidney injury [N17.9]  Yes    Iatrogenic pneumothorax [J95.811]  No    Pulmonary infiltrate [R91.8]  Unknown    Myelofibrosis [D75.81]  Yes    Personal history of transient ischemic attack (TIA), 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       83 y.o. female admitted with Acute kidney injury.    Acute Cystitis without Hematuria  - Urine culture grew ESBL.  - Completed ertapenem     KARLI  - Resolved with IVF  - Order repeat BMP in AM for reassessment.  - Appreciate nephrology recs- lasix restarted     Lung mass  Bilateral pulmonary infiltrates  - s/p bronchoscopy 11/8/23 with biopsies, pathology showing no evidence of malignancy but only 3 biopsies taken  - has small iatrogenic pneumothorax, improved with conservative treatment  - follow up chest CT scan without contrast in 6-8 weeks  - appreciate pulmonology recs, they have signed off-follow up with [facility]abtree for Dr. Billnigs, in 8 weeks  - transition patient from IV steroids to oral steroid taper     Aortic Stenosis/Chronic Diastolic CHF  Coronary artery disease  Paroxysmal atrial fibrillation  Hypertension  - Continue Eliquis, lasix, metoprolol  - no plavix at DC- cards rec resuming ASA 81mg- started 11/15   -With chest pain this morning, troponin is stable/consistent with previous troponin, EKG with sinus tach, no ischemic changes noted.    Myeloproliferative disorder/Anemia  Functional iron deficiency  Leukocytosis  - Has chronic leukocytosis, values elevated on most recent labs, however, relatively stable from prior.  - s/p 1  unit of PRBCs 11/6/23 and 11/7/23 with good response; also received ferrelicit  - oncology evaluated, o/p f/u planned- signed off  - daily cbc    Anxiety  -Start low-dose Ativan while inpatient 0.25mg q6h prn    SCDs for DVT prophylaxis.  Full code.  Discussed with nursing staff patient and family.   Anticipate discharge to SNU facility once arrangements have been made.      Chandni Luo MD  [facility]  11/17/23

## 2023-11-17 NOTE — CASE MANAGEMENT/SOCIAL WORK
Continued Stay Note  [facility]     Patient Name: Catherine Boyer  MRN: [id number]  Today's Date: 11/17/2023    Admit Date: 11/2/2023    Plan: [facility] PENDING precert.   Discharge Plan       Row Name 11/17/23 1216       Plan    Plan [facility] PENDING precert.    Plan Comments S/w [facility]/ [facility] - beds are tight at [facility], but [facility]  Pt's dtr Karen notified and is in agreement.  [facility] ...........Cherry CONTRERAS/ RENITA                   Discharge Codes    No documentation.                 Expected Discharge Date and Time       Expected Discharge Date Expected Discharge Time    Nov 17, 2023               Cherry Bowen RN

## 2023-11-17 NOTE — THERAPY TREATMENT NOTE
Patient Name: Catherine Boyer  : 1940    MRN: [id number]                              Today's Date: 2023       Admit Date: 2023    Visit Dx:     ICD-10-CM ICD-9-CM   1. Acute renal failure, unspecified acute renal failure type  N17.9 584.9   2. Acute abdominal pain  R10.9 789.00     338.19   3. Nausea vomiting and diarrhea  R11.2 787.91    R19.7 787.01   4. Pulmonary infiltrate  R91.8 793.19     Patient Active Problem List   Diagnosis    Acute on chronic diastolic heart failure    CAD (coronary artery disease)    Nonbacterial thrombotic endocarditis    Paroxysmal atrial fibrillation    Myeloproliferative disorder    Microcytic anemia    Type 2 diabetes mellitus with circulatory disorder, without long-term current use of insulin    GERD (gastroesophageal reflux disease)    Hypertension    Personal history of transient ischemic attack (TIA), 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 deficits    Porcelain gallbladder    Breast cancer    Atherosclerosis of abdominal aorta    APC (atrial premature contractions)    Moderate malnutrition    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    Elevated troponin    TIA (transient ischemic attack)    PUD (peptic ulcer disease)    Anemia    Tachycardia    Myelofibrosis    Normocytic anemia    Acute kidney injury    Pulmonary infiltrate    Iatrogenic pneumothorax     Past Medical History:   Diagnosis Date    Atherosclerosis of abdominal aorta 2023    Atrial fibrillation     Breast cancer     CAD (coronary artery disease)     NSTEMI 2022: 90% ostial LAD, 99% D1, 70% mid-distal LAD (medical therapy). She received two stents (2.5x18 and 2.5x26mm Finesse LEFTY) but I don't know which one went to which lesion.    Carotid atherosclerosis     Cholecystitis 2022    Added automatically from request for surgery [id number]    Chronic diastolic (congestive) heart failure     COVID 10/29/2022    GERD (gastroesophageal reflux disease)     Glaucoma     History of cataract     Hypertension      Microcytic anemia     per Dr. oleg pate office  note 6/30/22-dd    Myeloproliferative disorder     JAK2 positive    Nonbacterial thrombotic endocarditis     6/2021: 4x5mm vegetation on the ventricular surface of the anterior MV, negative blood cultures    Porcelain gallbladder     PUD (peptic ulcer disease)     TIA (transient ischemic attack)     Type 2 diabetes mellitus     Type 2 diabetes mellitus with circulatory disorder, without long-term current use of insulin 07/14/2022    Upper GI bleed      Past Surgical History:   Procedure Laterality Date    BREAST LUMPECTOMY  1999    BRONCHOSCOPY Bilateral 11/8/2023    Procedure: BRONCHOSCOPY UNDER FLUORO WITH BAL,  BIOPSIES; ACETYLCYSTEIN 4ML GIVEN;  Surgeon: Raoul Alford MD;  Location: [facility] ENDOSCOPY;  Service: Pulmonary;  Laterality: Bilateral;  PRE- PULMONARY INFILTRATES  POST- SAME    CARDIAC CATHETERIZATION N/A 09/02/2022    Procedure: Coronary angiography;  Surgeon: Guero Verde MD;  Location: [facility] CATH INVASIVE LOCATION;  Service: Cardiovascular;  Laterality: N/A;    CARDIAC CATHETERIZATION N/A 09/02/2022    Procedure: Stent LEFTY coronary;  Surgeon: Guero Verde MD;  Location: [facility] CATH INVASIVE LOCATION;  Service: Cardiovascular;  Laterality: N/A;    CATARACT EXTRACTION  2011    CHOLECYSTECTOMY      CHOLECYSTECTOMY WITH INTRAOPERATIVE CHOLANGIOGRAM N/A 11/28/2022    Procedure: CHOLECYSTECTOMY LAPAROSCOPIC INTRAOPERATIVE CHOLANGIOGRAM;  Surgeon: Dani Grover Jr., MD;  Location: [facility] MAIN OR;  Service: General;  Laterality: N/A;    COLONOSCOPY N/A 02/09/2023    Procedure: COLONOSCOPY TO CECUM & T.I.;  Surgeon: Guero Ferris MD;  Location: [facility] ENDOSCOPY;  Service: Gastroenterology;  Laterality: N/A;  PRE- MELENA, GI BLEED  POST- DIVERTICULOSIS, INT HEMORRHOIDS    ENDOSCOPY N/A 01/25/2023    Procedure: ESOPHAGOGASTRODUODENOSCOPY WITH BIOPSIES;  Surgeon: Charles Diaz MD;  Location: [facility] ENDOSCOPY;  Service: Gastroenterology;   Laterality: N/A;  pre: abd pain, nausea  post: hiatal hernia, mild gastritis, sloughing of the esophagus    ENTEROSCOPY SMALL BOWEL N/A 02/09/2023    Procedure: ENTEROSCOPY SMALL BOWEL;  Surgeon: Guero Ferris MD;  Location: [facility] ENDOSCOPY;  Service: Gastroenterology;  Laterality: N/A;  PRE- MELENA, GI BLEED  POST- HIATAL HERNIA    JOINT REPLACEMENT      UPPER GASTROINTESTINAL ENDOSCOPY        General Information       Row Name 11/17/23 1458          Physical Therapy Time and Intention    Document Type therapy note (daily note)  -SV     Mode of Treatment individual therapy;physical therapy  -SV       Row Name 11/17/23 1458          General Information    Patient Profile Reviewed yes  -SV               User Key  (r) = Recorded By, (t) = Taken By, (c) = Cosigned By      Initials Name Provider Type    SV Clare Rodriguez, PT Physical Therapist                   Mobility       Row Name 11/17/23 1458          Bed Mobility    All Activities, [place] (Bed Mobility) minimum assist (75% patient effort)  -SV     Supine-Sit [place] (Bed Mobility) minimum assist (75% patient effort)  -SV     Sit-Supine [place] (Bed Mobility) contact guard  -SV       Row Name 11/17/23 1458          Sit-Stand Transfer    Sit-Stand [place] (Transfers) minimum assist (75% patient effort)  -SV     Assistive Device (Sit-Stand Transfers) walker, front-wheeled  -SV       Row Name 11/17/23 1458          Gait/Stairs (Locomotion)    [place] Level (Gait) contact guard  -SV     Assistive Device (Gait) walker, front-wheeled  -SV     Distance in Feet (Gait) 40' flexed posture, shuffle pattern  -SV               User Key  (r) = Recorded By, (t) = Taken By, (c) = Cosigned By      Initials Name Provider Type    SV Clare Rodriguez, PT Physical Therapist                   Obj/Interventions    No documentation.                  Goals/Plan    No documentation.                  Clinical Impression       Row Name 11/17/23 1454           Pain    Pre/Posttreatment Pain Comment pain during STS  -SV     Pain Intervention(s) Ambulation/increased activity  -SV       Row Name 11/17/23 1459          Vital Signs    Intratreatment Heart Rate (beats/min) 136  -SV     Pre SpO2 (%) 93  -SV     O2 Delivery Pre Treatment supplemental O2  -SV     Intra SpO2 (%) 90  -SV     O2 Delivery Intra Treatment supplemental O2  -SV     Post SpO2 (%) 92  -SV     O2 Delivery Post Treatment supplemental O2  -SV     Pre Patient Position Supine  -SV     Intra Patient Position Standing  -SV     Post Patient Position Supine  -SV       Row Name 11/17/23 1459          Positioning and Restraints    Pre-Treatment Position in bed  -SV     Post Treatment Position bed  -SV     In Bed call light within reach;encouraged to call for assist;exit alarm on;fowlers  -SV               User Key  (r) = Recorded By, (t) = Taken By, (c) = Cosigned By      Initials Name Provider Type    Clare Kate, PT Physical Therapist                   Outcome Measures       Row Name 11/17/23 1500          How much help from another person do you currently need...    Turning from your back to your side while in flat bed without using bedrails? 4  -SV     Moving from lying on back to sitting on the side of a flat bed without bedrails? 3  -SV     Moving to and from a bed to a chair (including a wheelchair)? 3  -SV     Standing up from a chair using your arms (e.g., wheelchair, bedside chair)? 3  -SV     Climbing 3-5 steps with a railing? 2  -SV     To walk in hospital room? 3  -SV     AM-PAC 6 Clicks Score (PT) 18  -SV     Highest Level of Mobility Goal 6 --> Walk 10 steps or more  -SV               User Key  (r) = Recorded By, (t) = Taken By, (c) = Cosigned By      Initials Name Provider Type    Clare Kate, PT Physical Therapist                                 Physical Therapy Education       Title: PT OT SLP Therapies (In Progress)       Topic: Physical Therapy (In Progress)       Point: Mobility  training (In Progress)       Learning Progress Summary             Patient Acceptance, E, NR by SV at 11/17/2023 1500    Acceptance, E,TB,D, VU,NR by SM at 11/16/2023 1522    Acceptance, E,TB,D, VU,NR by SM at 11/15/2023 1648    Acceptance, E,TB,D, VU,NR by SM at 11/10/2023 1511    Acceptance, E, VU by ML at 11/10/2023 0407    Acceptance, E, VU by ML at 11/9/2023 0432    Acceptance, E, VU by ML at 11/8/2023 0449    Acceptance, E, VU by ML at 11/7/2023 0517    Acceptance, E, VU by ML at 11/6/2023 0459    Acceptance, E, VU by SK at 11/3/2023 1142   Family Acceptance, E, VU by SK at 11/3/2023 1142                         Point: Home exercise program (In Progress)       Learning Progress Summary             Patient Acceptance, E, NR by SV at 11/17/2023 1500    Acceptance, E,TB,D, VU,NR by SM at 11/16/2023 1522    Acceptance, E,TB,D, VU,NR by SM at 11/15/2023 1648    Acceptance, E,TB,D, VU,NR by SM at 11/14/2023 1435    Acceptance, E,TB,D, VU,NR by SM at 11/10/2023 1511    Acceptance, E, VU by ML at 11/10/2023 0407    Acceptance, E, VU by ML at 11/9/2023 0432    Acceptance, E, VU by ML at 11/8/2023 0449    Acceptance, E, VU by ML at 11/7/2023 0517    Acceptance, E, VU by ML at 11/6/2023 0459                         Point: Body mechanics (In Progress)       Learning Progress Summary             Patient Acceptance, E, NR by SV at 11/17/2023 1500    Acceptance, E,TB,D, VU,NR by  at 11/16/2023 1522    Acceptance, E,TB,D, VU,NR by SM at 11/15/2023 1648    Acceptance, E,TB,D, VU,NR by SM at 11/10/2023 1511    Acceptance, E, VU by ML at 11/10/2023 0407    Acceptance, E, VU by ML at 11/9/2023 0432    Acceptance, E, VU by ML at 11/8/2023 0449    Acceptance, E, VU by ML at 11/7/2023 0517    Acceptance, E, VU by ML at 11/6/2023 0459    Acceptance, E, VU by SK at 11/3/2023 1142   Family Acceptance, E, VU by SK at 11/3/2023 1142                         Point: Precautions (In Progress)       Learning Progress Summary              Patient Acceptance, E, NR by  at 11/17/2023 1500    Acceptance, E,TB,D, VU,NR by  at 11/16/2023 1522    Acceptance, E,TB,D, VU,NR by  at 11/15/2023 1648    Acceptance, E,TB,D, VU,NR by  at 11/10/2023 1511    Acceptance, E, VU by  at 11/10/2023 0407    Acceptance, E, VU by  at 11/9/2023 0432    Acceptance, E, VU by  at 11/8/2023 0449    Acceptance, E, VU by  at 11/7/2023 0517    Acceptance, E, VU by  at 11/6/2023 0459    Acceptance, E, VU by SK at 11/3/2023 1142   Family Acceptance, E, VU by SK at 11/3/2023 1142                                         User Key       Initials Effective Dates Name Provider Type Discipline     07/11/23 -  Clare Rodriguez, PT Physical Therapist PT     03/07/18 -  Sarah Su, PTA Physical Therapist Assistant PT     06/15/23 -  Isatu Johnson RN Registered Nurse Nurse    SK 10/10/23 -  Nessa Barajas, OZIEL Student PT Student PT                  PT Recommendation and Plan           Time Calculation:         PT Charges       Row Name 11/17/23 1445             Time Calculation    Start Time 1445  -      Stop Time 1500  -      Time Calculation (min) 15 min  -      PT Received On 11/17/23  -      PT - Next Appointment 11/18/23  -                User Key  (r) = Recorded By, (t) = Taken By, (c) = Cosigned By      Initials Name Provider Type     Clare Rodriguez, PT Physical Therapist                  Therapy Charges for Today       Code Description Service Date Service Provider Modifiers Qty    84468832792 HC PT THERAPEUTIC ACT EA 15 MIN 11/17/2023 Clare Rodriguez, PT GP 1            PT G-Codes  Outcome Measure Options: AM-PAC 6 Clicks Basic Mobility (PT)  AM-PAC 6 Clicks Score (PT): 18  AM-PAC 6 Clicks Score (OT): 15       Clare Rodriguez PT  11/17/2023

## 2023-11-17 NOTE — PROGRESS NOTES
CC:  Anemia  Melofibrosis    Subjective:  No new events noted  Sleeping comfortably.    Exam:  Vitals stable  Sleeping comfortably    Objective:  White blood cell count 41,000, from 58,000    Hemoglobin 7.8, from 8.2, from 8.1    Asessment/Plan:    Myelofibrosis:  Continue Ojjaara (momelotinib) for myelofibrosis    Anemia:  No transfusion needed    Discharge placement difficult.  Pre-CERT also pending.    Follow up with me scheduled on 11/20 but this can be delayed if needed    Will continue to follow peripherally.

## 2023-11-17 NOTE — PAYOR COMM NOTE
"Maribell Boyer (83 y.o. Female)     PLEASE SEE ATTACHED FOR CONTINUED INPT STAY DAYS    REF #   [id number]    PLEASE CALL SARA SAMUELS RN/ DEPT @ [phone]   OR [phone]    THANK YOU  SARA SAMUELS RN  [facility]        Date of Birth   1940    Social Security Number       Address   [facility] [address]    Home Phone   [phone]    MRN   [id number]       Adventism   Unknown    Marital Status                               Admission Date   11/2/23    Admission Type   Emergency    Admitting Provider   Kenton Meeks MD    Attending Provider   Chandni Luo MD    Department, Room/Bed   [address], S[address]/1       Discharge Date       Discharge Disposition       Discharge Destination                                 Attending Provider: Chandni Luo MD    Allergies: Aspirin, Diphenhydramine Hcl, Hydroxyurea, Oxycodone    Isolation: Contact   Infection: ESBL E coli (11/05/23)   Code Status: CPR    Ht: 162 cm (63.78\")   Wt: 63.6 kg (140 lb 3.4 oz)    Admission Cmt: None   Principal Problem: Acute kidney injury [N17.9]                   Active Insurance as of 11/2/2023       Primary Coverage       Payor Plan Insurance Group Employer/Plan Group    AETNA MEDICARE REPLACEMENT AETNA MEDICARE REPLACEMENT [id number]       Payor Plan Address Payor Plan Phone Number Payor Plan Fax Number Effective Dates    [address] [phone]  1/1/2022 - None Entered    [facility] [id number]         Subscriber Name Subscriber Birth Date Member ID       MARIBELL BOYER 1940 [id number]                     Emergency Contacts        (Rel.) Home Phone Work Phone Mobile Phone    MONIKRAY (Daughter) [phone] -- [phone]    Karan Boyer (2ndPOA) (Son) -- -- [phone]    Karen Gutierrez (1stPOA) (Daughter) -- -- [phone]    Greg Boyer (Son) -- -- [phone]    " MEG BANKS (Brother) -- -- [phone]              [place]: [facility] [id number]  Tax ID [id number]     Physician Progress Notes (last 72 hours)        Chandni Luo MD at [phone]              Name: Catherine Boyer ADMIT: 2023   : 1940  PCP: Kristi Moreau APRN    MRN: [id number] LOS: 15 days   AGE/SEX: 83 y.o. female  ROOM: [facility]     Subjective   Subjective   Resting in bed on her side. Family at bedside. She thinks her breathing is worse today, c/o more coughing.     Objective   Objective   Vital Signs  Temp:  [98.1 °F (36.7 °C)-99.1 °F (37.3 °C)] 98.4 °F (36.9 °C)  Heart Rate:  [] 118  Resp:  [16-18] 18  BP: ()/(51-67) 126/57  SpO2:  [91 %-98 %] 93 %  on  Flow (L/min):  [2] 2;   Device (Oxygen Therapy): nasal cannula  Body mass index is 24.23 kg/m².  Physical Exam  Vitals and nursing note reviewed.   Constitutional:       General: She is not in acute distress.     Comments: Elderly, chronically ill-appearing   Cardiovascular:      Rate and Rhythm: Normal rate and regular rhythm.      Pulses: Normal pulses.      Heart sounds: Normal heart sounds.   Pulmonary:      Effort: Pulmonary effort is normal. No respiratory distress.      Breath sounds: No wheezing.      Comments: Coarse breath sounds  Abdominal:      General: Bowel sounds are normal.      Palpations: Abdomen is soft.      Tenderness: There is no abdominal tenderness.   Skin:     General: Skin is warm and dry.      Coloration: Skin is pale.       Results Review     I reviewed the patient's new clinical results.  Results from last 7 days   Lab Units 11/17/23  0527 11/16/23  0547 11/15/23  0606 23  0620   WBC 10*3/mm3 41.55* 58.29* 55.98* 72.56*   HEMOGLOBIN g/dL 7.8* 8.2* 8.1* 8.4*   PLATELETS 10*3/mm3 200 197 201 203     Results from last 7 days   Lab Units 23  0547 11/15/23  2252 11/15/23  0606 23  0620   SODIUM mmol/L 139 138  --  139 139   POTASSIUM mmol/L 4.1 4.8 5.0 4.1 4.7  "  CHLORIDE mmol/L 98 97*  --  97* 100   CO2 mmol/L 32.0* 29.2*  --  29.6* 26.8   BUN mg/dL 19 20  --  18 13   CREATININE mg/dL 0.90 0.92  --  0.79 0.89   GLUCOSE mg/dL 102* 177*  --  127* 92   EGFR mL/min/1.73 62.8 61.1  --  73.4 63.6     Results from last 7 days   Lab Units 11/17/23  0527 11/16/23  0547 11/15/23  0606 11/14/23  0620   ALBUMIN g/dL 3.8 3.7 3.9 3.6     Results from last 7 days   Lab Units 11/17/23  0527 11/16/23  0547 11/15/23  2252 11/15/23  0606 11/14/23  1857 11/14/23  0620 11/12/23  0728 11/11/23  0702   CALCIUM mg/dL 9.2 9.3  --  9.2  --  9.1   < > 9.3   ALBUMIN g/dL 3.8 3.7  --  3.9  --  3.6   < > 3.8   MAGNESIUM mg/dL  --   --  2.1  --   --   --   --  1.6   PHOSPHORUS mg/dL 3.1 4.0  --  3.7 3.9 2.2*   < > 2.6    < > = values in this interval not displayed.           No results found for: \"HGBA1C\", \"POCGLU\"    No radiology results for the last day    I have personally reviewed all medications:  Scheduled Medications  apixaban, 5 mg, Oral, Q12H  aspirin, 81 mg, Oral, Daily  famotidine, 20 mg, Oral, Daily  furosemide, 10 mg, Oral, Daily  ipratropium-albuterol, 3 mL, Nebulization, Q8H - RT  latanoprost, 1 drop, Both Eyes, Nightly  lidocaine, 20 mL, Infiltration, Once  metoprolol succinate XL, 25 mg, Oral, Daily  Momelotinib Dihydrochloride, 200 mg, Oral, Daily  predniSONE, 50 mg, Oral, Daily With Breakfast   Followed by  [START ON 11/20/2023] predniSONE, 40 mg, Oral, Daily With Breakfast   Followed by  [START ON 11/23/2023] predniSONE, 30 mg, Oral, Daily With Breakfast   Followed by  [START ON 11/26/2023] predniSONE, 20 mg, Oral, Daily With Breakfast   Followed by  [START ON 11/29/2023] predniSONE, 10 mg, Oral, Daily With Breakfast  saccharomyces boulardii, 250 mg, Oral, BID  senna-docusate sodium, 2 tablet, Oral, BID  sertraline, 100 mg, Oral, Daily    Infusions   Diet  Diet: Cardiac Diets, Diabetic Diets; Healthy Heart (2-3 Na+); Consistent Carbohydrate; Texture: Regular Texture (IDDSI 7); Fluid " Consistency: Thin (IDDSI 0)    I have personally reviewed:  [x]  Laboratory   []  Microbiology   []  Radiology   [x]  EKG/Telemetry  []  Cardiology/Vascular   []  Pathology    []  Records      Assessment/Plan     Active Hospital Problems    Diagnosis  POA    **Acute kidney injury [N17.9]  Yes    Iatrogenic pneumothorax [J95.811]  No    Pulmonary infiltrate [R91.8]  Unknown    Myelofibrosis [D75.81]  Yes    Personal history of transient ischemic attack (TIA), 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       83 y.o. female admitted with Acute kidney injury.    Acute Cystitis without Hematuria  - Urine culture grew ESBL.  - Completed ertapenem     KARLI  - Resolved with IVF  - Order repeat BMP in AM for reassessment.  - Appreciate nephrology recs- lasix restarted     Lung mass  Bilateral pulmonary infiltrates  - s/p bronchoscopy 11/8/23 with biopsies, pathology showing no evidence of malignancy but only 3 biopsies taken  - has small iatrogenic pneumothorax, improved with conservative treatment  - follow up chest CT scan without contrast in 6-8 weeks  - appreciate pulmonology recs, they have signed off-follow up with [facility] Crabtree for Dr. Billings, in 8 weeks  - transition patient from IV steroids to oral steroid taper     Aortic Stenosis/Chronic Diastolic CHF  Coronary artery disease  Paroxysmal atrial fibrillation  Hypertension  - Continue Eliquis, lasix, metoprolol  - no plavix at DC- cards rec resuming ASA 81mg- started 11/15   -With chest pain this morning, troponin is stable/consistent with previous troponin, EKG with sinus tach, no ischemic  changes noted.    Myeloproliferative disorder/Anemia  Functional iron deficiency  Leukocytosis  - Has chronic leukocytosis, values elevated on most recent labs, however, relatively stable from prior.  - s/p 1 unit of PRBCs 11/6/23 and 11/7/23 with good response; also received ferrelicit  - oncology evaluated, o/p f/u planned- signed off  - daily cbc    Anxiety  -Start low-dose Ativan while inpatient 0.25mg q6h prn    SCDs for DVT prophylaxis.  Full code.  Discussed with nursing staff patient and family.   Anticipate discharge to SNU facility once arrangements have been made.      Chandni Luo MD  [facility]  11/17/23          Electronically signed by Chandni Luo MD at 11/17/23 1324       Darrell Hanley MD at 11/17/23 1237             LOS: 15 days     Chief Complaint/ Reason for encounter: KARLI    Subjective   11/06/23 : Patient resting with family at bedside able provide some history  She is doing well overall no new complaints  Weight up today but she is not edematous, not short of breath  She is on 3 L oxygen which is near her home rate  Eating some, drinking well according to family    11/7: No new complaints, bronchoscopy with biopsy scheduled for today  Oral intake remains very low, no shortness of breath chest pain or edema    11/8: Events noted, patient developed a right pneumothorax after her bronchoscopy today.  Higher than normal bleeding during the procedure noted, CT-guided chest tube fortunately not needed  She has been intermittently hypotensive, currently 89/48, O2 requirements about the same, 3 L    11/9: She doing a little better today, fortunately did not need a chest tube  Oxygen requirements near baseline of around 3 L    11/10 family room, says her appetite is low and that she is tired, bp near goal     11/11: Resting, no new complaints or events, no shortness of breath or chest pain overnight  Good appetite    11/12: She looks and feels well denies  "complaints, still with very poor appetite no dyspnea    11/13: Oral intake a little better per patient family and nursing, no nausea vomiting  No shortness of breath chest pain or edema    11/14: no acute events. Feeling ok. Denies sob or chest pain     11/15 no acute events. She is feeling well, no new complaints.     11/16: Intermittent chest pain, currently resting and denies any new complaints  Oxygen requirements remain near her home rate, currently 2 L    11/17: She is doing well denies any new problems or complaints  No shortness of breath chest pain fevers chills or edema    Medical history reviewed:  History of Present Illness    Subjective    History taken from: Patient and chart    Vital Signs  Temp:  [98.1 °F (36.7 °C)-99.1 °F (37.3 °C)] 98.4 °F (36.9 °C)  Heart Rate:  [] 118  Resp:  [16-18] 18  BP: ()/(51-67) 126/57       Wt Readings from Last 1 Encounters:   11/16/23 0548 63.6 kg (140 lb 3.4 oz)   11/15/23 0600 63.7 kg (140 lb 6.9 oz)   11/14/23 0600 63.2 kg (139 lb 5.3 oz)   11/13/23 0600 66.4 kg (146 lb 6.2 oz)   11/12/23 0509 66.7 kg (147 lb)   11/11/23 0545 67.1 kg (147 lb 14.9 oz)   11/10/23 0600 67.8 kg (149 lb 7.6 oz)   11/09/23 0600 67.7 kg (149 lb 4 oz)   11/08/23 0428 67.7 kg (149 lb 4 oz)   11/07/23 0600 67.6 kg (149 lb 0.5 oz)   11/06/23 0605 67.6 kg (149 lb 0.5 oz)   11/05/23 0507 62.6 kg (138 lb)   11/04/23 0539 62.6 kg (138 lb)   11/03/23 0556 63 kg (138 lb 14.2 oz)       Objective:  Vital signs: (most recent): Blood pressure 126/57, pulse 118, temperature 98.4 °F (36.9 °C), temperature source Oral, resp. rate 18, height 162 cm (63.78\"), weight 63.6 kg (140 lb 3.4 oz), SpO2 93%.                Objective:  General Appearance:  Comfortable, well-appearing, in no acute distress and not in pain.  Awake, alert, oriented. Eating lunch   HEENT: Mucous membranes moist, no injury, oropharynx clear  Lungs:  Normal effort and normal respiratory rate.  Breath sounds clear to " auscultation.  No  respiratory distress.  No rales, decreased breath sounds or rhonchi.    Heart: Normal rate.  Regular rhythm.  S1, S2 normal.  No murmur.   Abdomen: Abdomen is soft.  Bowel sounds are normal, no abdominal tenderness.  There is no rebound or guarding  Extremities: no edema of bilateral lower extremities  Neurological: No focal motor or sensory deficits, pupils reactive  Skin:  Warm and dry.  No rash or cyanosis.       Results Review:    Intake/Output:     Intake/Output Summary (Last 24 hours) at 11/17/2023 1237  Last data filed at 11/16/2023 1700  Gross per 24 hour   Intake 120 ml   Output 600 ml   Net -480 ml         DATA:  Radiology and Labs:  The following labs independently reviewed by me. Additional labs ordered for tomorrow a.m.  Interval notes, chart personally reviewed by me.   Old records independently reviewed showing normal baseline creatinine, history of CHF  Discussed with patient's family at bedside    Risk/ complexity of medical care/ medical decision making moderate risk, KARLI, fluid and electrolyte management    Labs:   Recent Results (from the past 24 hour(s))   Renal Function Panel    Collection Time: 11/17/23  5:27 AM    Specimen: Blood   Result Value Ref Range    Glucose 102 (H) 65 - 99 mg/dL    BUN 19 8 - 23 mg/dL    Creatinine 0.90 0.57 - 1.00 mg/dL    Sodium 139 136 - 145 mmol/L    Potassium 4.1 3.5 - 5.2 mmol/L    Chloride 98 98 - 107 mmol/L    CO2 32.0 (H) 22.0 - 29.0 mmol/L    Calcium 9.2 8.6 - 10.5 mg/dL    Albumin 3.8 3.5 - 5.2 g/dL    Phosphorus 3.1 2.5 - 4.5 mg/dL    Anion Gap 9.0 5.0 - 15.0 mmol/L    BUN/Creatinine Ratio 21.1 7.0 - 25.0    eGFR 62.8 >60.0 mL/min/1.73   CBC Auto Differential    Collection Time: 11/17/23  5:27 AM    Specimen: Blood   Result Value Ref Range    WBC 41.55 (C) 3.40 - 10.80 10*3/mm3    RBC 2.71 (L) 3.77 - 5.28 10*6/mm3    Hemoglobin 7.8 (L) 12.0 - 15.9 g/dL    Hematocrit 25.5 (L) 34.0 - 46.6 %    MCV 94.1 79.0 - 97.0 fL    MCH 28.8 26.6 -  33.0 pg    MCHC 30.6 (L) 31.5 - 35.7 g/dL    RDW 17.7 (H) 12.3 - 15.4 %    RDW-SD 60.0 (H) 37.0 - 54.0 fl    MPV 11.6 6.0 - 12.0 fL    Platelets 200 140 - 450 10*3/mm3   Manual Differential    Collection Time: 11/17/23  5:27 AM    Specimen: Blood   Result Value Ref Range    Neutrophil % 77.0 (H) 42.7 - 76.0 %    Lymphocyte % 8.0 (L) 19.6 - 45.3 %    Monocyte % 11.0 5.0 - 12.0 %    Eosinophil % 1.0 0.3 - 6.2 %    Metamyelocyte % 2.0 (H) 0.0 - 0.0 %    Myelocyte % 1.0 (H) 0.0 - 0.0 %    Neutrophils Absolute 31.99 (H) 1.70 - 7.00 10*3/mm3    Lymphocytes Absolute 3.32 (H) 0.70 - 3.10 10*3/mm3    Monocytes Absolute 4.57 (H) 0.10 - 0.90 10*3/mm3    Eosinophils Absolute 0.42 (H) 0.00 - 0.40 10*3/mm3    RBC Morphology Normal Normal    WBC Morphology Normal Normal    Platelet Morphology Normal Normal       Radiology:  Pertinent radiology studies were reviewed as described above      Medications have been reviewed separately in chart overview    Narrative & Impression   Portable chest radiograph     HISTORY: Pneumothorax     TECHNIQUE: Single AP portable radiograph of the chest     COMPARISON: Chest radiograph 11/8/2023     IMPRESSION:  FINDINGS AND IMPRESSION:  Previously seen right pneumothorax has decreased in size resulting in  approximately 1.6 cm in pleural-parenchymal separation (previously 2.8  cm). There is moderate pulmonary opacification throughout bilateral  lungs, right greater than left, suggestive of multifocal pneumonia  and/or pulmonary edema in the appropriate clinical context and  correlation with patient history is recommended with follow-up chest CT  if clinically indicated. Given the somewhat masslike opacification  overlying the right lung, at least continued attention on follow-up  chest radiograph in 4 to 6 weeks is recommended to ensure appropriate  evolution/resolution and exclude any possibility of neoplasm.            ASSESSMENT:  Acute kidney injury, prerenal, rapidly improving and near  baseline  Chronic diastolic CHF - EF 55%   Nausea vomiting and dehydration, better  Left lingular lung mass  Myeloproliferative disorder, hematology now following  Chronic leukocytosis  Atrial fibrillation  Diarrhea, slowly resolving  Acute on chronic anemia  Diabetes mellitus  UTI on ertapenem, ESBL E. coli  Lung mass, biopsy 11/8  Postop pneumothorax, had improved by the time chest tube was to be placed      DISCUSSION/PLAN:   Renal function, volume and electrolytes remain at goal  Oral intake seems to be improving  Continue current, low-dose oral diuretic dose for now  BP at goal  Phosphorus, potassium normalizing as well  CO2 a little higher, will monitor  We will plan to maintain current diuretic dose today and at discharge    Follow-up a.m. labs  Discharge planning, okay from renal standpoint at any time      Darrell Hanley MD  [facility]   Office phone number: [phone]  Answering service phone number: [phone]    11/17/23  12:37 EST          Electronically signed by Darrell Hanley MD at 11/17/23 1238       Darrell Reinoso MD at 11/17/23 0838          Sleeping comfortably.    Vitals stable    White blood cell count 41,000, from 58,000    Hemoglobin 7.8, from 8.2, from 8.1    Continue Ojjaara (momelotinib) for myelofibrosis    No transfusion needed    Discharge placement difficult.  Pre-CERT also pending.    Follow up with me scheduled on 11/20 but this can be delayed if needed    Will continue to follow peripherally.         Electronically signed by Darrell Reinoso MD at 11/17/23 1247       Darrell Hanley MD at 11/16/23 1504             LOS: 14 days     Chief Complaint/ Reason for encounter: KARIL    Subjective   11/06/23 : Patient resting with family at bedside able provide some history  She is doing well overall no new complaints  Weight up today but she is not edematous, not short of breath  She is on 3 L oxygen which is near her home rate  Eating some, drinking well  according to family    11/7: No new complaints, bronchoscopy with biopsy scheduled for today  Oral intake remains very low, no shortness of breath chest pain or edema    11/8: Events noted, patient developed a right pneumothorax after her bronchoscopy today.  Higher than normal bleeding during the procedure noted, CT-guided chest tube fortunately not needed  She has been intermittently hypotensive, currently 89/48, O2 requirements about the same, 3 L    11/9: She doing a little better today, fortunately did not need a chest tube  Oxygen requirements near baseline of around 3 L    11/10 family room, says her appetite is low and that she is tired, bp near goal     11/11: Resting, no new complaints or events, no shortness of breath or chest pain overnight  Good appetite    11/12: She looks and feels well denies complaints, still with very poor appetite no dyspnea    11/13: Oral intake a little better per patient family and nursing, no nausea vomiting  No shortness of breath chest pain or edema    11/14: no acute events. Feeling ok. Denies sob or chest pain     11/15 no acute events. She is feeling well, no new complaints.     11/16: Intermittent chest pain, currently resting and denies any new complaints  Oxygen requirements remain near her home rate, currently 2 L      Medical history reviewed:  History of Present Illness    Subjective    History taken from: Patient and chart    Vital Signs  Temp:  [97.5 °F (36.4 °C)-99.1 °F (37.3 °C)] 98.1 °F (36.7 °C)  Heart Rate:  [] 93  Resp:  [16-20] 16  BP: ()/(48-94) 95/51       Wt Readings from Last 1 Encounters:   11/16/23 0548 63.6 kg (140 lb 3.4 oz)   11/15/23 0600 63.7 kg (140 lb 6.9 oz)   11/14/23 0600 63.2 kg (139 lb 5.3 oz)   11/13/23 0600 66.4 kg (146 lb 6.2 oz)   11/12/23 0509 66.7 kg (147 lb)   11/11/23 0545 67.1 kg (147 lb 14.9 oz)   11/10/23 0600 67.8 kg (149 lb 7.6 oz)   11/09/23 0600 67.7 kg (149 lb 4 oz)   11/08/23 0428 67.7 kg (149 lb 4 oz)  "  11/07/23 0600 67.6 kg (149 lb 0.5 oz)   11/06/23 0605 67.6 kg (149 lb 0.5 oz)   11/05/23 0507 62.6 kg (138 lb)   11/04/23 0539 62.6 kg (138 lb)   11/03/23 0556 63 kg (138 lb 14.2 oz)       Objective:  Vital signs: (most recent): Blood pressure 95/51, pulse 93, temperature 98.1 °F (36.7 °C), temperature source Oral, resp. rate 16, height 162 cm (63.78\"), weight 63.6 kg (140 lb 3.4 oz), SpO2 91%.                Objective:  General Appearance:  Comfortable, well-appearing, in no acute distress and not in pain.  Awake, alert, oriented. Eating lunch   HEENT: Mucous membranes moist, no injury, oropharynx clear  Lungs:  Normal effort and normal respiratory rate.  Breath sounds clear to auscultation.  No  respiratory distress.  No rales, decreased breath sounds or rhonchi.    Heart: Normal rate.  Regular rhythm.  S1, S2 normal.  No murmur.   Abdomen: Abdomen is soft.  Bowel sounds are normal, no abdominal tenderness.  There is no rebound or guarding  Extremities: no edema of bilateral lower extremities  Neurological: No focal motor or sensory deficits, pupils reactive  Skin:  Warm and dry.  No rash or cyanosis.       Results Review:    Intake/Output:     Intake/Output Summary (Last 24 hours) at 11/16/2023 1504  Last data filed at 11/16/2023 1200  Gross per 24 hour   Intake 480 ml   Output 750 ml   Net -270 ml         DATA:  Radiology and Labs:  The following labs independently reviewed by me. Additional labs ordered for tomorrow a.m.  Interval notes, chart personally reviewed by me.   Old records independently reviewed showing normal baseline creatinine, history of CHF  Discussed with patient's family at bedside    Risk/ complexity of medical care/ medical decision making moderate risk, KARLI, fluid and electrolyte management    Labs:   Recent Results (from the past 24 hour(s))   ECG 12 Lead Chest Pain    Collection Time: 11/15/23 10:35 PM   Result Value Ref Range    QT Interval 353 ms    QTC Interval 501 ms   High " Sensitivity Troponin T    Collection Time: 11/15/23 10:52 PM    Specimen: Blood   Result Value Ref Range    HS Troponin T 33 (H) <14 ng/L   Potassium    Collection Time: 11/15/23 10:52 PM    Specimen: Blood   Result Value Ref Range    Potassium 5.0 3.5 - 5.2 mmol/L   Magnesium    Collection Time: 11/15/23 10:52 PM    Specimen: Blood   Result Value Ref Range    Magnesium 2.1 1.6 - 2.4 mg/dL   High Sensitivity Troponin T 2Hr    Collection Time: 11/15/23 11:49 PM    Specimen: Blood   Result Value Ref Range    HS Troponin T 33 (H) <14 ng/L    Troponin T Delta 0 >=-4 - <+4 ng/L   Renal Function Panel    Collection Time: 11/16/23  5:47 AM    Specimen: Blood   Result Value Ref Range    Glucose 177 (H) 65 - 99 mg/dL    BUN 20 8 - 23 mg/dL    Creatinine 0.92 0.57 - 1.00 mg/dL    Sodium 138 136 - 145 mmol/L    Potassium 4.8 3.5 - 5.2 mmol/L    Chloride 97 (L) 98 - 107 mmol/L    CO2 29.2 (H) 22.0 - 29.0 mmol/L    Calcium 9.3 8.6 - 10.5 mg/dL    Albumin 3.7 3.5 - 5.2 g/dL    Phosphorus 4.0 2.5 - 4.5 mg/dL    Anion Gap 11.8 5.0 - 15.0 mmol/L    BUN/Creatinine Ratio 21.7 7.0 - 25.0    eGFR 61.1 >60.0 mL/min/1.73   CBC Auto Differential    Collection Time: 11/16/23  5:47 AM    Specimen: Blood   Result Value Ref Range    WBC 58.29 (C) 3.40 - 10.80 10*3/mm3    RBC 2.80 (L) 3.77 - 5.28 10*6/mm3    Hemoglobin 8.2 (L) 12.0 - 15.9 g/dL    Hematocrit 26.5 (L) 34.0 - 46.6 %    MCV 94.6 79.0 - 97.0 fL    MCH 29.3 26.6 - 33.0 pg    MCHC 30.9 (L) 31.5 - 35.7 g/dL    RDW 17.4 (H) 12.3 - 15.4 %    RDW-SD 60.1 (H) 37.0 - 54.0 fl    MPV 11.7 6.0 - 12.0 fL    Platelets 197 140 - 450 10*3/mm3   Manual Differential    Collection Time: 11/16/23  5:47 AM    Specimen: Blood   Result Value Ref Range    Neutrophil % 80.0 (H) 42.7 - 76.0 %    Lymphocyte % 6.0 (L) 19.6 - 45.3 %    Monocyte % 9.0 5.0 - 12.0 %    Basophil % 2.0 (H) 0.0 - 1.5 %    Metamyelocyte % 2.0 (H) 0.0 - 0.0 %    Myelocyte % 1.0 (H) 0.0 - 0.0 %    Neutrophils Absolute 46.63 (H) 1.70 -  7.00 10*3/mm3    Lymphocytes Absolute 3.50 (H) 0.70 - 3.10 10*3/mm3    Monocytes Absolute 5.25 (H) 0.10 - 0.90 10*3/mm3    Basophils Absolute 1.17 (H) 0.00 - 0.20 10*3/mm3    RBC Morphology Normal Normal    WBC Morphology Normal Normal    Platelet Morphology Normal Normal       Radiology:  Pertinent radiology studies were reviewed as described above      Medications have been reviewed separately in chart overview    Narrative & Impression   Portable chest radiograph     HISTORY: Pneumothorax     TECHNIQUE: Single AP portable radiograph of the chest     COMPARISON: Chest radiograph 11/8/2023     IMPRESSION:  FINDINGS AND IMPRESSION:  Previously seen right pneumothorax has decreased in size resulting in  approximately 1.6 cm in pleural-parenchymal separation (previously 2.8  cm). There is moderate pulmonary opacification throughout bilateral  lungs, right greater than left, suggestive of multifocal pneumonia  and/or pulmonary edema in the appropriate clinical context and  correlation with patient history is recommended with follow-up chest CT  if clinically indicated. Given the somewhat masslike opacification  overlying the right lung, at least continued attention on follow-up  chest radiograph in 4 to 6 weeks is recommended to ensure appropriate  evolution/resolution and exclude any possibility of neoplasm.            ASSESSMENT:  Acute kidney injury, prerenal, rapidly improving and near baseline  Chronic diastolic CHF - EF 55%   Nausea vomiting and dehydration, better  Left lingular lung mass  Myeloproliferative disorder, hematology now following  Chronic leukocytosis  Atrial fibrillation  Diarrhea, slowly resolving  Acute on chronic anemia  Diabetes mellitus  UTI on ertapenem, ESBL E. coli  Lung mass, biopsy 11/8  Postop pneumothorax, had improved by the time chest tube was to be placed      DISCUSSION/PLAN:   Renal function, volume and electrolytes remain at goal  Oral intake seems to be improving  Continue  current, low-dose oral diuretic dose for now  Phos  at goal today, potassium 4.8 and on the higher side of normal.  We will monitor     Continue to monitor electrolytes and volume closely  Follow-up a.m. labs  Discharge planning      Darrell Hanley MD  [facility]   Office phone number: [phone]  Answering service phone number: [phone]    23  15:04 EST          Electronically signed by Darrell Hanley MD at 23 [phone]       Chandni Luo MD at 23 1117              Name: Catherine Boyer ADMIT: 2023   : 1940  PCP: Kristi Moreau APRN    MRN: [id number] LOS: 14 days   AGE/SEX: 83 y.o. female  ROOM: [facility]     Subjective   Subjective   Resting in bed on her side. No apparent distress. Awaiting rehab placement. WBC elevated but consistent with myeloproliferative disorder.   Patient reported episode of chest pain this morning, EKG and troponins were stable from previous episodes.  Family thinks anxiety may be playing a component and asking for something for anxiety.    Objective   Objective   Vital Signs  Temp:  [97.3 °F (36.3 °C)-99.1 °F (37.3 °C)] 97.5 °F (36.4 °C)  Heart Rate:  [] 96  Resp:  [18-20] 18  BP: (104-143)/(48-94) 108/54  SpO2:  [89 %-99 %] 97 %  on  Flow (L/min):  [2-5] 4;   Device (Oxygen Therapy): nasal cannula  Body mass index is 24.23 kg/m².  Physical Exam  Vitals and nursing note reviewed.   Constitutional:       General: She is not in acute distress.     Comments: Elderly, chronically ill-appearing   Cardiovascular:      Rate and Rhythm: Normal rate and regular rhythm.      Pulses: Normal pulses.      Heart sounds: Normal heart sounds.   Pulmonary:      Effort: Pulmonary effort is normal. No respiratory distress.      Breath sounds: Normal breath sounds. No wheezing.   Abdominal:      General: Bowel sounds are normal.      Palpations: Abdomen is soft.      Tenderness: There is no abdominal tenderness.   Skin:     General: Skin is  "warm and dry.      Coloration: Skin is pale.       Results Review     I reviewed the patient's new clinical results.  Results from last 7 days   Lab Units 11/16/23  0547 11/15/23  0606 11/14/23  0620 11/13/23  0541   WBC 10*3/mm3 58.29* 55.98* 72.56* 59.77*   HEMOGLOBIN g/dL 8.2* 8.1* 8.4* 7.8*   PLATELETS 10*3/mm3 197 201 203 201     Results from last 7 days   Lab Units 11/16/23  0547 11/15/23  2252 11/15/23  0606 11/14/23  0620 11/13/23  2046 11/13/23 0541   SODIUM mmol/L 138  --  139 139  --  140   POTASSIUM mmol/L 4.8 5.0 4.1 4.7   < > 3.1*   CHLORIDE mmol/L 97*  --  97* 100  --  100   CO2 mmol/L 29.2*  --  29.6* 26.8  --  28.5   BUN mg/dL 20  --  18 13  --  13   CREATININE mg/dL 0.92  --  0.79 0.89  --  0.78   GLUCOSE mg/dL 177*  --  127* 92  --  103*   EGFR mL/min/1.73 61.1  --  73.4 63.6  --  74.5    < > = values in this interval not displayed.     Results from last 7 days   Lab Units 11/16/23  0547 11/15/23  0606 11/14/23  0620 11/13/23  0541   ALBUMIN g/dL 3.7 3.9 3.6 3.5     Results from last 7 days   Lab Units 11/16/23  0547 11/15/23  2252 11/15/23  0606 11/14/23  1857 11/14/23 0620 11/13/23  0541 11/12/23  0728 11/11/23  0702   CALCIUM mg/dL 9.3  --  9.2  --  9.1 9.1   < > 9.3   ALBUMIN g/dL 3.7  --  3.9  --  3.6 3.5   < > 3.8   MAGNESIUM mg/dL  --  2.1  --   --   --   --   --  1.6   PHOSPHORUS mg/dL 4.0  --  3.7 3.9 2.2* 2.6   < > 2.6    < > = values in this interval not displayed.           No results found for: \"HGBA1C\", \"POCGLU\"    No radiology results for the last day    I have personally reviewed all medications:  Scheduled Medications  apixaban, 5 mg, Oral, Q12H  aspirin, 81 mg, Oral, Daily  famotidine, 20 mg, Oral, Daily  furosemide, 10 mg, Oral, Daily  ipratropium-albuterol, 3 mL, Nebulization, Q8H - RT  latanoprost, 1 drop, Both Eyes, Nightly  lidocaine, 20 mL, Infiltration, Once  methylPREDNISolone sodium succinate, 40 mg, Intravenous, Q12H  metoprolol succinate XL, 25 mg, Oral, " Daily  Momelotinib Dihydrochloride, 200 mg, Oral, Daily  saccharomyces boulardii, 250 mg, Oral, BID  senna-docusate sodium, 2 tablet, Oral, BID  sertraline, 100 mg, Oral, Daily    Infusions   Diet  Diet: Cardiac Diets, Diabetic Diets; Healthy Heart (2-3 Na+); Consistent Carbohydrate; Texture: Regular Texture (IDDSI 7); Fluid Consistency: Thin (IDDSI 0)    I have personally reviewed:  [x]  Laboratory   []  Microbiology   []  Radiology   [x]  EKG/Telemetry  []  Cardiology/Vascular   []  Pathology    []  Records      Assessment/Plan     Active Hospital Problems    Diagnosis  POA    **Acute kidney injury [N17.9]  Yes    Iatrogenic pneumothorax [J95.811]  No    Pulmonary infiltrate [R91.8]  Unknown    Myelofibrosis [D75.81]  Yes    Personal history of transient ischemic attack (TIA), 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       83 y.o. female admitted with Acute kidney injury.    Acute Cystitis without Hematuria  - Urine culture grew ESBL.  - Completed ertapenem     KARLI  - Resolved with IVF  - Order repeat BMP in AM for reassessment.  - Appreciate nephrology recs- lasix restarted     Lung mass  - Indeterminate, lingular on imaging. s/p bronchoscopy 11/8/23 with biopsies, pathology showing no evidence of malignancy but only 3 biopsies (as opposed to 6-10) were obtained due to bleeding  - has small iatrogenic pneumothorax, improved with conservative treatment  - follow up chest CT scan without contrast in 6-8 weeks  - appreciate pulmonology recs, they have signed off-follow up with [facility] for Dr. Billings, in 8 weeks     Aortic  Stenosis/Chronic Diastolic CHF  Coronary artery disease  Paroxysmal atrial fibrillation  Hypertension  - Continue Eliquis, lasix, metoprolol  - no plavix at DC- cards rec resuming ASA 81mg- started 11/15   -With chest pain this morning, troponin is stable/consistent with previous troponin, EKG with sinus tach, no ischemic changes noted.       Myeloproliferative disorder/Anemia  Functional iron deficiency  Leukocytosis  - Has chronic leukocytosis, values elevated on most recent labs, however, relatively stable from prior.  - s/p 1 unit of PRBCs 23 and 23 with good response; also received ferrelicit  - oncology evaluated, o/p f/u planned- signed off  - daily cbc    Anxiety  -Start low-dose Ativan while inpatient 0.25mg q6h prn    SCDs for DVT prophylaxis.  Full code.  Discussed with nursing staff.  Anticipate discharge to SNU facility once arrangements have been made.      Chandni Luo MD  [facility]  23          Electronically signed by Chandni Luo MD at 23 1116       Darrell Reinoso MD at 23 1041          Patient states she's feeling better.    Vitals stable    She's awake, alert, and talkative on 4L NC    WBC and HGB stable    Continue [place] (momelotinib) for myelofibrosis    No transfusion needed    Follow up with me scheduled on  but this can be delayed if needed    Will continue to follow peripherally.         Electronically signed by Darrell Reinoso MD at 23 1043       Chandni Luo MD at 11/15/23 1317              Name: Catherine Boyer ADMIT: 2023   : 1940  PCP: Kristi Moreau APRN    MRN: [id number] LOS: 13 days   AGE/SEX: 83 y.o. female  ROOM: [facility]/     Subjective   Subjective   Resting in bed on her side. No apparent distress. Awaiting rehab placement. WBC elevated but consistent with myeloproliferative disorder.     Objective   Objective   Vital Signs  Temp:  [97.9 °F (36.6 °C)-98.6 °F (37 °C)] 97.9 °F (36.6  °C)  Heart Rate:  [] 120  Resp:  [18] 18  BP: ()/() 131/101  SpO2:  [95 %-99 %] 98 %  on  Flow (L/min):  [3] 3;   Device (Oxygen Therapy): nasal cannula  Body mass index is 24.27 kg/m².  Physical Exam  Vitals and nursing note reviewed.   Constitutional:       General: She is not in acute distress.     Comments: Elderly, chronically ill-appearing   Cardiovascular:      Rate and Rhythm: Normal rate and regular rhythm.      Pulses: Normal pulses.      Heart sounds: Normal heart sounds.   Pulmonary:      Effort: Pulmonary effort is normal. No respiratory distress.      Breath sounds: Normal breath sounds. No wheezing.   Abdominal:      General: Bowel sounds are normal.      Palpations: Abdomen is soft.      Tenderness: There is no abdominal tenderness.   Skin:     General: Skin is warm and dry.      Coloration: Skin is pale.   Neurological:      Mental Status: She is alert.       Results Review     I reviewed the patient's new clinical results.  Results from last 7 days   Lab Units 11/15/23  0606 11/14/23  0620 11/13/23  0541 11/12/23  0728   WBC 10*3/mm3 55.98* 72.56* 59.77* 59.91*   HEMOGLOBIN g/dL 8.1* 8.4* 7.8* 8.3*   PLATELETS 10*3/mm3 201 203 201 224     Results from last 7 days   Lab Units 11/15/23  0606 11/14/23  0620 11/13/23  2046 11/13/23  0541 11/12/23  0728   SODIUM mmol/L 139 139  --  140 140   POTASSIUM mmol/L 4.1 4.7 4.9 3.1* 3.8   CHLORIDE mmol/L 97* 100  --  100 102   CO2 mmol/L 29.6* 26.8  --  28.5 28.0   BUN mg/dL 18 13  --  13 13   CREATININE mg/dL 0.79 0.89  --  0.78 0.81   GLUCOSE mg/dL 127* 92  --  103* 104*   EGFR mL/min/1.73 73.4 63.6  --  74.5 71.2     Results from last 7 days   Lab Units 11/15/23  0606 11/14/23  0620 11/13/23  0541 11/12/23  0728   ALBUMIN g/dL 3.9 3.6 3.5 4.1     Results from last 7 days   Lab Units 11/15/23  0606 11/14/23  1857 11/14/23  0620 11/13/23  0541 11/12/23  0728 11/11/23  0702 11/09/23  0214 11/09/23  0008   CALCIUM mg/dL 9.2  --  9.1 9.1 9.5 9.3  "  < >  --    ALBUMIN g/dL 3.9  --  3.6 3.5 4.1 3.8   < >  --    MAGNESIUM mg/dL  --   --   --   --   --  1.6  --  1.6   PHOSPHORUS mg/dL 3.7 3.9 2.2* 2.6 2.9 2.6   < >  --     < > = values in this interval not displayed.           No results found for: \"HGBA1C\", \"POCGLU\"    No radiology results for the last day    I have personally reviewed all medications:  Scheduled Medications  apixaban, 5 mg, Oral, Q12H  famotidine, 20 mg, Oral, Daily  furosemide, 10 mg, Oral, Daily  ipratropium-albuterol, 3 mL, Nebulization, Q8H - RT  latanoprost, 1 drop, Both Eyes, Nightly  lidocaine, 20 mL, Infiltration, Once  methylPREDNISolone sodium succinate, 40 mg, Intravenous, Q12H  metoprolol succinate XL, 25 mg, Oral, Daily  Momelotinib Dihydrochloride, 200 mg, Oral, Daily  saccharomyces boulardii, 250 mg, Oral, BID  senna-docusate sodium, 2 tablet, Oral, BID  sertraline, 100 mg, Oral, Daily    Infusions   Diet  Diet: Cardiac Diets, Diabetic Diets; Healthy Heart (2-3 Na+); Consistent Carbohydrate; Texture: Regular Texture (IDDSI 7); Fluid Consistency: Thin (IDDSI 0)    I have personally reviewed:  [x]  Laboratory   []  Microbiology   []  Radiology   [x]  EKG/Telemetry  []  Cardiology/Vascular   []  Pathology    []  Records      Assessment/Plan     Active Hospital Problems    Diagnosis  POA    **Acute kidney injury [N17.9]  Yes    Iatrogenic pneumothorax [J95.811]  No    Pulmonary infiltrate [R91.8]  Unknown    Myelofibrosis [D75.81]  Yes    Personal history of transient ischemic attack (TIA), 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       83 y.o. female admitted " with Acute kidney injury.    Acute Cystitis without Hematuria  - Urine culture grew ESBL.  - Completed ertapenem     KARLI  - Resolved with IVF  - Order repeat BMP in AM for reassessment.  - Appreciate nephrology recs- lasix restarted     Lung mass  - Indeterminate, lingular on imaging. s/p bronchoscopy 11/8/23 with biopsies, pathology showing no evidence of malignancy but only 3 biopsies (as opposed to 6-10) were obtained due to bleeding  - has small iatrogenic pneumothorax, improved with conservative treatment  - follow up chest CT scan without contrast in 6-8 weeks  - appreciate pulmonology recs, they have signed off-follow up with [facility] for Dr. Billings, in 8 weeks     Aortic Stenosis/Chronic Diastolic CHF  Coronary artery disease  Paroxysmal atrial fibrillation  Hypertension  - Continue Eliquis, plavix held on admission however given her issues with anemia, cardiology rec resuming ASA 81mg- started 11/15  - continue metoprolol 25, some degree of tachycardia expected given acute issues; HR is overall improving  - continue lasix     Myeloproliferative disorder/Anemia  Functional iron deficiency  Leukocytosis  - Has chronic leukocytosis, values elevated on most recent labs, however, relatively stable from prior.  - s/p 1 unit of PRBCs 11/6/23 and 11/7/23 with good response; also received ferrelicit  - oncology evaluated, o/p f/u planned- signed off  - daily cbc    SCDs for DVT prophylaxis.  Full code.  Discussed with nursing staff.  Anticipate discharge to SNU facility once arrangements have been made.      Chandni Luo MD  [facility]  11/15/23          Electronically signed by Chandni Luo MD at 11/15/23 1320       Darrell Reinoso MD at 11/15/23 0841          Following peripherally.    Nursing communicated with me about the white blood cell count.    Yesterday, the white blood cell count increased at 72,000.  Today, it has improved at 56,000.  Hemoglobin is 8.1.   Platelets 201,000.    She was sleeping very comfortably when I entered the room today, so I did not disturb her.    Electronically signed by Darrell Reinoso MD at 11/15/23 1151       Minerva Whaley MD at 11/15/23 0852             LOS: 13 days     Chief Complaint/ Reason for encounter: KARLI    Subjective   11/06/23 : Patient resting with family at bedside able provide some history  She is doing well overall no new complaints  Weight up today but she is not edematous, not short of breath  She is on 3 L oxygen which is near her home rate  Eating some, drinking well according to family    11/7: No new complaints, bronchoscopy with biopsy scheduled for today  Oral intake remains very low, no shortness of breath chest pain or edema    11/8: Events noted, patient developed a right pneumothorax after her bronchoscopy today.  Higher than normal bleeding during the procedure noted, CT-guided chest tube fortunately not needed  She has been intermittently hypotensive, currently 89/48, O2 requirements about the same, 3 L    11/9: She doing a little better today, fortunately did not need a chest tube  Oxygen requirements near baseline of around 3 L    11/10 family room, says her appetite is low and that she is tired, bp near goal     11/11: Resting, no new complaints or events, no shortness of breath or chest pain overnight  Good appetite    11/12: She looks and feels well denies complaints, still with very poor appetite no dyspnea    11/13: Oral intake a little better per patient family and nursing, no nausea vomiting  No shortness of breath chest pain or edema    11/14: no acute events. Feeling ok. Denies sob or chest pain     11/15 no acute events. She is feeling well, no new complaints.     Medical history reviewed:  History of Present Illness    Subjective    History taken from: Patient and chart    Vital Signs  Temp:  [97.3 °F (36.3 °C)-98.6 °F (37 °C)] 97.3 °F (36.3 °C)  Heart Rate:  [] 86  Resp:  [18] 18  BP:  "()/() 104/62       Wt Readings from Last 1 Encounters:   11/15/23 0600 63.7 kg (140 lb 6.9 oz)   11/14/23 0600 63.2 kg (139 lb 5.3 oz)   11/13/23 0600 66.4 kg (146 lb 6.2 oz)   11/12/23 0509 66.7 kg (147 lb)   11/11/23 0545 67.1 kg (147 lb 14.9 oz)   11/10/23 0600 67.8 kg (149 lb 7.6 oz)   11/09/23 0600 67.7 kg (149 lb 4 oz)   11/08/23 0428 67.7 kg (149 lb 4 oz)   11/07/23 0600 67.6 kg (149 lb 0.5 oz)   11/06/23 0605 67.6 kg (149 lb 0.5 oz)   11/05/23 0507 62.6 kg (138 lb)   11/04/23 0539 62.6 kg (138 lb)   11/03/23 0556 63 kg (138 lb 14.2 oz)       Objective:  Vital signs: (most recent): Blood pressure 104/62, pulse 86, temperature 97.3 °F (36.3 °C), temperature source Oral, resp. rate 18, height 162 cm (63.78\"), weight 63.7 kg (140 lb 6.9 oz), SpO2 93%.                Objective:  General Appearance:  Comfortable, well-appearing, in no acute distress and not in pain.  Awake, alert, oriented. Eating lunch   HEENT: Mucous membranes moist, no injury, oropharynx clear  Lungs:  Normal effort and normal respiratory rate.  Breath sounds clear to auscultation.  No  respiratory distress.  No rales, decreased breath sounds or rhonchi.    Heart: Normal rate.  Regular rhythm.  S1, S2 normal.  No murmur.   Abdomen: Abdomen is soft.  Bowel sounds are normal, no abdominal tenderness.  There is no rebound or guarding  Extremities: no edema of bilateral lower extremities  Neurological: No focal motor or sensory deficits, pupils reactive  Skin:  Warm and dry.  No rash or cyanosis.       Results Review:    Intake/Output:     Intake/Output Summary (Last 24 hours) at 11/15/2023 1617  Last data filed at 11/15/2023 1200  Gross per 24 hour   Intake 240 ml   Output --   Net 240 ml         DATA:  Radiology and Labs:  The following labs independently reviewed by me. Additional labs ordered for tomorrow a.m.  Interval notes, chart personally reviewed by me.   Old records independently reviewed showing normal baseline creatinine, " history of CHF  Discussed with patient's family at bedside    Risk/ complexity of medical care/ medical decision making moderate risk, KARLI, fluid and electrolyte management    Labs:   Recent Results (from the past 24 hour(s))   Phosphorus    Collection Time: 11/14/23  6:57 PM    Specimen: Blood   Result Value Ref Range    Phosphorus 3.9 2.5 - 4.5 mg/dL   Renal Function Panel    Collection Time: 11/15/23  6:06 AM    Specimen: Blood   Result Value Ref Range    Glucose 127 (H) 65 - 99 mg/dL    BUN 18 8 - 23 mg/dL    Creatinine 0.79 0.57 - 1.00 mg/dL    Sodium 139 136 - 145 mmol/L    Potassium 4.1 3.5 - 5.2 mmol/L    Chloride 97 (L) 98 - 107 mmol/L    CO2 29.6 (H) 22.0 - 29.0 mmol/L    Calcium 9.2 8.6 - 10.5 mg/dL    Albumin 3.9 3.5 - 5.2 g/dL    Phosphorus 3.7 2.5 - 4.5 mg/dL    Anion Gap 12.4 5.0 - 15.0 mmol/L    BUN/Creatinine Ratio 22.8 7.0 - 25.0    eGFR 73.4 >60.0 mL/min/1.73   CBC Auto Differential    Collection Time: 11/15/23  6:06 AM    Specimen: Blood   Result Value Ref Range    WBC 55.98 (C) 3.40 - 10.80 10*3/mm3    RBC 2.80 (L) 3.77 - 5.28 10*6/mm3    Hemoglobin 8.1 (L) 12.0 - 15.9 g/dL    Hematocrit 25.8 (L) 34.0 - 46.6 %    MCV 92.1 79.0 - 97.0 fL    MCH 28.9 26.6 - 33.0 pg    MCHC 31.4 (L) 31.5 - 35.7 g/dL    RDW 16.8 (H) 12.3 - 15.4 %    RDW-SD 54.6 (H) 37.0 - 54.0 fl    MPV 11.8 6.0 - 12.0 fL    Platelets 201 140 - 450 10*3/mm3   Manual Differential    Collection Time: 11/15/23  6:06 AM    Specimen: Blood   Result Value Ref Range    Neutrophil % 83.0 (H) 42.7 - 76.0 %    Lymphocyte % 0.0 (L) 19.6 - 45.3 %    Monocyte % 11.0 5.0 - 12.0 %    Metamyelocyte % 1.0 (H) 0.0 - 0.0 %    Myelocyte % 5.0 (H) 0.0 - 0.0 %    Neutrophils Absolute 46.46 (H) 1.70 - 7.00 10*3/mm3    Lymphocytes Absolute 0.00 (L) 0.70 - 3.10 10*3/mm3    Monocytes Absolute 6.16 (H) 0.10 - 0.90 10*3/mm3    RBC Morphology Normal Normal    WBC Morphology Normal Normal    Platelet Morphology Normal Normal       Radiology:  Pertinent radiology  studies were reviewed as described above      Medications have been reviewed separately in chart overview    Narrative & Impression   Portable chest radiograph     HISTORY: Pneumothorax     TECHNIQUE: Single AP portable radiograph of the chest     COMPARISON: Chest radiograph 11/8/2023     IMPRESSION:  FINDINGS AND IMPRESSION:  Previously seen right pneumothorax has decreased in size resulting in  approximately 1.6 cm in pleural-parenchymal separation (previously 2.8  cm). There is moderate pulmonary opacification throughout bilateral  lungs, right greater than left, suggestive of multifocal pneumonia  and/or pulmonary edema in the appropriate clinical context and  correlation with patient history is recommended with follow-up chest CT  if clinically indicated. Given the somewhat masslike opacification  overlying the right lung, at least continued attention on follow-up  chest radiograph in 4 to 6 weeks is recommended to ensure appropriate  evolution/resolution and exclude any possibility of neoplasm.            ASSESSMENT:  Acute kidney injury, prerenal, rapidly improving and near baseline  Chronic diastolic CHF - EF 55%   Nausea vomiting and dehydration, better  Left lingular lung mass  Myeloproliferative disorder, hematology now following  Chronic leukocytosis  Atrial fibrillation  Diarrhea, slowly resolving  Acute on chronic anemia  Diabetes mellitus  UTI on ertapenem, ESBL E. coli  Lung mass, biopsy 11/8  Postop pneumothorax, had improved by the time chest tube was to be placed      DISCUSSION/PLAN:   Renal function, volume and most electrolytes remain at goal  Oral intake seems to be improving  Continue current diuretic dose for now  Monitor serum CO2    Phos  at goal today     Continue to monitor electrolytes and volume closely  Follow-up a.m. labs      Minerva Whaley MD  [facility]   Office phone number: [phone]  Answering service phone number: [phone]    11/15/23  16:17  EST          Electronically signed by Minerva Whaley MD at 11/15/23 9677

## 2023-11-17 NOTE — PLAN OF CARE
Goal Outcome Evaluation:            Pt declined initially due to hygiene needs. Pt agreed after brief change. Pt min asst for sup to sit. STS to rwx with minasst. She amb with rwx and cga for 40 ' on O2. She reported soa and fatigue. Pt encouraged to sit up in chair for improved endurance. She reported fatigue due to visitors today.

## 2023-11-18 LAB
ALBUMIN SERPL-MCNC: 3.7 G/DL (ref 3.5–5.2)
ANION GAP SERPL CALCULATED.3IONS-SCNC: 16.2 MMOL/L (ref 5–15)
BUN SERPL-MCNC: 17 MG/DL (ref 8–23)
BUN/CREAT SERPL: 22.1 (ref 7–25)
CALCIUM SPEC-SCNC: 9 MG/DL (ref 8.6–10.5)
CHLORIDE SERPL-SCNC: 94 MMOL/L (ref 98–107)
CO2 SERPL-SCNC: 27.8 MMOL/L (ref 22–29)
CREAT SERPL-MCNC: 0.77 MG/DL (ref 0.57–1)
DEPRECATED RDW RBC AUTO: 59.8 FL (ref 37–54)
EGFRCR SERPLBLD CKD-EPI 2021: 75.7 ML/MIN/1.73
ERYTHROCYTE [DISTWIDTH] IN BLOOD BY AUTOMATED COUNT: 18.2 % (ref 12.3–15.4)
GLUCOSE SERPL-MCNC: 152 MG/DL (ref 65–99)
HCT VFR BLD AUTO: 26.8 % (ref 34–46.6)
HGB BLD-MCNC: 8.3 G/DL (ref 12–15.9)
LYMPHOCYTES # BLD MANUAL: 0.85 10*3/MM3 (ref 0.7–3.1)
LYMPHOCYTES NFR BLD MANUAL: 9.1 % (ref 5–12)
MCH RBC QN AUTO: 28.9 PG (ref 26.6–33)
MCHC RBC AUTO-ENTMCNC: 31 G/DL (ref 31.5–35.7)
MCV RBC AUTO: 93.4 FL (ref 79–97)
METAMYELOCYTES NFR BLD MANUAL: 3 % (ref 0–0)
MONOCYTES # BLD: 3.87 10*3/MM3 (ref 0.1–0.9)
MYELOCYTES NFR BLD MANUAL: 4 % (ref 0–0)
NEUTROPHILS # BLD AUTO: 34.81 10*3/MM3 (ref 1.7–7)
NEUTROPHILS NFR BLD MANUAL: 81.8 % (ref 42.7–76)
PHOSPHATE SERPL-MCNC: 3 MG/DL (ref 2.5–4.5)
PLAT MORPH BLD: NORMAL
PLATELET # BLD AUTO: 199 10*3/MM3 (ref 140–450)
PMV BLD AUTO: 10.9 FL (ref 6–12)
POLYCHROMASIA BLD QL SMEAR: ABNORMAL
POTASSIUM SERPL-SCNC: 3.8 MMOL/L (ref 3.5–5.2)
RBC # BLD AUTO: 2.87 10*6/MM3 (ref 3.77–5.28)
SODIUM SERPL-SCNC: 138 MMOL/L (ref 136–145)
VARIANT LYMPHS NFR BLD MANUAL: 2 % (ref 19.6–45.3)
WBC MORPH BLD: NORMAL
WBC NRBC COR # BLD AUTO: 42.55 10*3/MM3 (ref 3.4–10.8)

## 2023-11-18 PROCEDURE — 94761 N-INVAS EAR/PLS OXIMETRY MLT: CPT

## 2023-11-18 PROCEDURE — 63710000001 PREDNISONE PER 5 MG: Performed by: STUDENT IN AN ORGANIZED HEALTH CARE EDUCATION/TRAINING PROGRAM

## 2023-11-18 PROCEDURE — 80069 RENAL FUNCTION PANEL: CPT | Performed by: INTERNAL MEDICINE

## 2023-11-18 PROCEDURE — 85025 COMPLETE CBC W/AUTO DIFF WBC: CPT | Performed by: INTERNAL MEDICINE

## 2023-11-18 PROCEDURE — 85007 BL SMEAR W/DIFF WBC COUNT: CPT | Performed by: INTERNAL MEDICINE

## 2023-11-18 PROCEDURE — 94799 UNLISTED PULMONARY SVC/PX: CPT

## 2023-11-18 PROCEDURE — 94664 DEMO&/EVAL PT USE INHALER: CPT

## 2023-11-18 RX ADMIN — SERTRALINE 100 MG: 100 TABLET, FILM COATED ORAL at 09:02

## 2023-11-18 RX ADMIN — APIXABAN 5 MG: 5 TABLET, FILM COATED ORAL at 09:03

## 2023-11-18 RX ADMIN — IPRATROPIUM BROMIDE AND ALBUTEROL SULFATE 3 ML: 2.5; .5 SOLUTION RESPIRATORY (INHALATION) at 23:41

## 2023-11-18 RX ADMIN — LATANOPROST 1 DROP: 50 SOLUTION/ DROPS OPHTHALMIC at 20:56

## 2023-11-18 RX ADMIN — FAMOTIDINE 20 MG: 20 TABLET ORAL at 09:02

## 2023-11-18 RX ADMIN — IPRATROPIUM BROMIDE AND ALBUTEROL SULFATE 3 ML: 2.5; .5 SOLUTION RESPIRATORY (INHALATION) at 08:20

## 2023-11-18 RX ADMIN — IPRATROPIUM BROMIDE AND ALBUTEROL SULFATE 3 ML: 2.5; .5 SOLUTION RESPIRATORY (INHALATION) at 14:56

## 2023-11-18 RX ADMIN — LORAZEPAM 0.25 MG: 0.5 TABLET ORAL at 23:32

## 2023-11-18 RX ADMIN — APIXABAN 5 MG: 5 TABLET, FILM COATED ORAL at 20:50

## 2023-11-18 RX ADMIN — Medication 3 MG: at 20:50

## 2023-11-18 RX ADMIN — Medication 250 MG: at 09:02

## 2023-11-18 RX ADMIN — PREDNISONE 50 MG: 50 TABLET ORAL at 09:01

## 2023-11-18 RX ADMIN — Medication 250 MG: at 20:50

## 2023-11-18 RX ADMIN — FUROSEMIDE 10 MG: 20 TABLET ORAL at 09:01

## 2023-11-18 RX ADMIN — METOPROLOL SUCCINATE 25 MG: 25 TABLET, EXTENDED RELEASE ORAL at 09:01

## 2023-11-18 RX ADMIN — ASPIRIN 81 MG: 81 TABLET, COATED ORAL at 09:12

## 2023-11-18 NOTE — PROGRESS NOTES
[facility]     LOS: 16 days     Subjective:    CC:Abdominal Pain, Nausea, and Vomiting    DIET:  Diet Order   Procedures    Diet: Cardiac Diets, Diabetic Diets; Healthy Heart (2-3 Na+); Consistent Carbohydrate; Texture: Regular Texture (IDDSI 7); Fluid Consistency: Thin (IDDSI 0)     No new complaints    Objective:    Vital Signs:  Temp:  [97.5 °F (36.4 °C)-98.6 °F (37 °C)] 97.5 °F (36.4 °C)  Heart Rate:  [] 105  Resp:  [16-18] 16  BP: ()/(53-64) 107/61    SpO2:  [87 %-100 %] 87 %  on  Flow (L/min):  [2-3] 3;   Device (Oxygen Therapy): nasal cannula  Body mass index is 24.2 kg/m².    Physical Exam  Constitutional:       Appearance: Normal appearance.   HENT:      Head: Normocephalic and atraumatic.   Cardiovascular:      Rate and Rhythm: Normal rate and regular rhythm.      Heart sounds: No murmur heard.     No friction rub.   Pulmonary:      Effort: Pulmonary effort is normal.      Breath sounds: Normal breath sounds.   Abdominal:      General: Bowel sounds are normal. There is no distension.      Palpations: Abdomen is soft.      Tenderness: There is no abdominal tenderness.   Skin:     General: Skin is warm and dry.   Neurological:      General: No focal deficit present.      Mental Status: She is alert.         Results Review:    Glucose   Date Value Ref Range Status   11/18/2023 152 (H) 65 - 99 mg/dL Final   11/17/2023 102 (H) 65 - 99 mg/dL Final   11/16/2023 177 (H) 65 - 99 mg/dL Final     Results from last 7 days   Lab Units 11/18/23  0540   WBC 10*3/mm3 42.55*   HEMOGLOBIN g/dL 8.3*   HEMATOCRIT % 26.8*   PLATELETS 10*3/mm3 199     Results from last 7 days   Lab Units 11/18/23  0540   SODIUM mmol/L 138   POTASSIUM mmol/L 3.8   CHLORIDE mmol/L 94*   CO2 mmol/L 27.8   BUN mg/dL 17   CREATININE mg/dL 0.77   CALCIUM mg/dL 9.0   GLUCOSE mg/dL 152*         Results from last 7 days   Lab Units 11/15/23  2252   MAGNESIUM mg/dL 2.1     Results from last 7 days   Lab Units  "11/15/23  2349 11/15/23  2252 11/13/23  1320   HSTROP T ng/L 33* 33* 27*     Cultures:  No results found for: \"BLOODCX\", \"URINECX\", \"WOUNDCX\", \"MRSACX\", \"RESPCX\", \"STOOLCX\"    I have reviewed daily medications and changes in CPOE    Scheduled meds  apixaban, 5 mg, Oral, Q12H  aspirin, 81 mg, Oral, Daily  famotidine, 20 mg, Oral, Daily  furosemide, 10 mg, Oral, Daily  ipratropium-albuterol, 3 mL, Nebulization, Q8H - RT  latanoprost, 1 drop, Both Eyes, Nightly  lidocaine, 20 mL, Infiltration, Once  metoprolol succinate XL, 25 mg, Oral, Daily  Momelotinib Dihydrochloride, 200 mg, Oral, Daily  predniSONE, 50 mg, Oral, Daily With Breakfast   Followed by  [START ON 11/20/2023] predniSONE, 40 mg, Oral, Daily With Breakfast   Followed by  [START ON 11/23/2023] predniSONE, 30 mg, Oral, Daily With Breakfast   Followed by  [START ON 11/26/2023] predniSONE, 20 mg, Oral, Daily With Breakfast   Followed by  [START ON 11/29/2023] predniSONE, 10 mg, Oral, Daily With Breakfast  saccharomyces boulardii, 250 mg, Oral, BID  senna-docusate sodium, 2 tablet, Oral, BID  sertraline, 100 mg, Oral, Daily           PRN meds    acetaminophen    senna-docusate sodium **AND** polyethylene glycol **AND** bisacodyl **AND** bisacodyl    HYDROcodone Bit-Homatrop MBr    LORazepam    melatonin    nitroglycerin    ondansetron **OR** ondansetron    Phosphorus Replacement - Follow Nurse / BPA Driven Protocol    Potassium Replacement - Follow Nurse / BPA Driven Protocol    simethicone    Insert Peripheral IV **AND** sodium chloride        Acute kidney injury    CAD (coronary artery disease)    Paroxysmal atrial fibrillation    Myeloproliferative disorder    Type 2 diabetes mellitus with circulatory disorder, without long-term current use of insulin    Hypertension    Personal history of transient ischemic attack (TIA), 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 deficits    Myelofibrosis    Pulmonary infiltrate    Iatrogenic " pneumothorax        Assessment/Plan:      83 y.o. female admitted with Acute kidney injury.     Acute Cystitis without Hematuria  - Urine culture grew ESBL.  - Completed ertapenem     KARLI  - Resolved with IVF  - Order repeat BMP in AM for reassessment.  - Appreciate nephrology recs- lasix restarted     Lung mass  Bilateral pulmonary infiltrates  - s/p bronchoscopy 11/8/23 with biopsies, pathology showing no evidence of malignancy but only 3 biopsies taken  - has small iatrogenic pneumothorax, improved with conservative treatment  - follow up chest CT scan without contrast in 6-8 weeks  - appreciate pulmonology recs, they have signed off-follow up with [facility]ab[facility] for Dr. Billings, in 8 weeks  - transition patient from IV steroids to oral steroid taper     Aortic Stenosis/Chronic Diastolic CHF  Coronary artery disease  Paroxysmal atrial fibrillation  Hypertension  - Continue Eliquis, lasix, metoprolol  - no plavix at DC- cards rec resuming ASA 81mg- started 11/15   -With chest pain this morning, troponin is stable/consistent with previous troponin, EKG with sinus tach, no ischemic changes noted.     Myeloproliferative disorder/Anemia  Functional iron deficiency  Leukocytosis  - Has chronic leukocytosis, values elevated on most recent labs, however, relatively stable from prior.  - s/p 1 unit of PRBCs 11/6/23 and 11/7/23 with good response; also received ferrelicit  - oncology evaluated, o/p f/u planned- signed off  - daily cbc     Anxiety  -Start low-dose Ativan while inpatient 0.25mg q6h prn     D/w nurse  Copied text reviewed      Eduard Rees MD  11/18/23  17:47 EST

## 2023-11-18 NOTE — PROGRESS NOTES
"RENAL/K[facility]:     LOS: 16 days     Chief Complaint/ Reason for encounter: KARLI    Subjective   Chart reviewed  No complaints    Vital Signs  Temp:  [97.8 °F (36.6 °C)-98.6 °F (37 °C)] 98.4 °F (36.9 °C)  Heart Rate:  [] 105  Resp:  [16-18] 18  BP: ()/(53-64) 149/61       Wt Readings from Last 1 Encounters:   11/18/23 0528 63.5 kg (140 lb)   11/16/23 0548 63.6 kg (140 lb 3.4 oz)   11/15/23 0600 63.7 kg (140 lb 6.9 oz)   11/14/23 0600 63.2 kg (139 lb 5.3 oz)   11/13/23 0600 66.4 kg (146 lb 6.2 oz)   11/12/23 0509 66.7 kg (147 lb)   11/11/23 0545 67.1 kg (147 lb 14.9 oz)   11/10/23 0600 67.8 kg (149 lb 7.6 oz)   11/09/23 0600 67.7 kg (149 lb 4 oz)   11/08/23 0428 67.7 kg (149 lb 4 oz)   11/07/23 0600 67.6 kg (149 lb 0.5 oz)   11/06/23 0605 67.6 kg (149 lb 0.5 oz)   11/05/23 0507 62.6 kg (138 lb)   11/04/23 0539 62.6 kg (138 lb)   11/03/23 0556 63 kg (138 lb 14.2 oz)       Objective:  Vital signs: (most recent): Blood pressure 149/61, pulse 105, temperature 98.4 °F (36.9 °C), temperature source Oral, resp. rate 18, height 162 cm (63.78\"), weight 63.5 kg (140 lb), SpO2 98%.                Objective:  General Appearance:  Comfortable, well-appearing, in no acute distress and not in pain.  Awake, alert, oriented. Eating lunch   HEENT: Mucous membranes moist, no injury, oropharynx clear  Lungs:  Normal effort and normal respiratory rate.  Breath sounds clear to auscultation.  No  respiratory distress.  No rales, decreased breath sounds or rhonchi.    Heart: Normal rate.  Regular rhythm.  S1, S2 normal.  No murmur.   Abdomen: Abdomen is soft.  Bowel sounds are normal, no abdominal tenderness.  There is no rebound or guarding  Extremities: no edema of bilateral lower extremities  Neurological: No focal motor or sensory deficits, pupils reactive  Skin:  Warm and dry.  No rash or cyanosis.       Results Review:    Intake/Output:   No intake or output data in the 24 hours ending 11/18/23 1248        Labs:   Recent Results " (from the past 24 hour(s))   Renal Function Panel    Collection Time: 11/18/23  5:40 AM    Specimen: Blood   Result Value Ref Range    Glucose 152 (H) 65 - 99 mg/dL    BUN 17 8 - 23 mg/dL    Creatinine 0.77 0.57 - 1.00 mg/dL    Sodium 138 136 - 145 mmol/L    Potassium 3.8 3.5 - 5.2 mmol/L    Chloride 94 (L) 98 - 107 mmol/L    CO2 27.8 22.0 - 29.0 mmol/L    Calcium 9.0 8.6 - 10.5 mg/dL    Albumin 3.7 3.5 - 5.2 g/dL    Phosphorus 3.0 2.5 - 4.5 mg/dL    Anion Gap 16.2 (H) 5.0 - 15.0 mmol/L    BUN/Creatinine Ratio 22.1 7.0 - 25.0    eGFR 75.7 >60.0 mL/min/1.73   CBC Auto Differential    Collection Time: 11/18/23  5:40 AM    Specimen: Blood   Result Value Ref Range    WBC 42.55 (C) 3.40 - 10.80 10*3/mm3    RBC 2.87 (L) 3.77 - 5.28 10*6/mm3    Hemoglobin 8.3 (L) 12.0 - 15.9 g/dL    Hematocrit 26.8 (L) 34.0 - 46.6 %    MCV 93.4 79.0 - 97.0 fL    MCH 28.9 26.6 - 33.0 pg    MCHC 31.0 (L) 31.5 - 35.7 g/dL    RDW 18.2 (H) 12.3 - 15.4 %    RDW-SD 59.8 (H) 37.0 - 54.0 fl    MPV 10.9 6.0 - 12.0 fL    Platelets 199 140 - 450 10*3/mm3   Manual Differential    Collection Time: 11/18/23  5:40 AM    Specimen: Blood   Result Value Ref Range    Neutrophil % 81.8 (H) 42.7 - 76.0 %    Lymphocyte % 2.0 (L) 19.6 - 45.3 %    Monocyte % 9.1 5.0 - 12.0 %    Metamyelocyte % 3.0 (H) 0.0 - 0.0 %    Myelocyte % 4.0 (H) 0.0 - 0.0 %    Neutrophils Absolute 34.81 (H) 1.70 - 7.00 10*3/mm3    Lymphocytes Absolute 0.85 0.70 - 3.10 10*3/mm3    Monocytes Absolute 3.87 (H) 0.10 - 0.90 10*3/mm3    Polychromasia Slight/1+ None Seen    WBC Morphology Normal Normal    Platelet Morphology Normal Normal       ASSESSMENT:  Acute kidney injury, prerenal, rapidly improving and at baseline  Chronic diastolic CHF - EF 55%   Nausea vomiting and dehydration, better  Left lingular lung mass  Myeloproliferative disorder, hematology now following  Chronic leukocytosis  Atrial fibrillation  Diarrhea, slowly resolving  Acute on chronic anemia  Diabetes mellitus  UTI on  ertapenem, ESBL E. coli  Lung mass, biopsy 11/8  Postop pneumothorax, had improved by the time chest tube was to be placed      DISCUSSION/PLAN:   Renal function, volume and electrolytes remain at goal  Oral intake seems to be improving  Continue current, low-dose oral diuretic dose for now  BP at goal  Phosphorus, potassium normalizing as well  We will plan to maintain current diuretic dose today and at discharge  Discharge planning, okay from renal standpoint at any time      Jn Riley MD  [facility]   Office phone number: [phone]  Answering service phone number: [phone]    11/18/23  12:48 EST

## 2023-11-18 NOTE — PLAN OF CARE
Problem: Adult Inpatient Plan of Care  Goal: Plan of Care Review  Outcome: Ongoing, Progressing  Flowsheets  Taken 11/17/2023 1922 by Agustín Espinoza, RN  Plan of Care Reviewed With: patient  Outcome Evaluation: VSS. A&O x4. No issues or complaints from the patient at this time. Pt tolerated PT well and ate her meals. Call light within reach. Bed alarm set.  Taken 11/16/2023 1522 by Sarah Su PTA  Progress: improving     Problem: Adult Inpatient Plan of Care  Goal: Optimal Comfort and Wellbeing  Intervention: Provide Person-Centered Care  Recent Flowsheet Documentation  Taken 11/17/2023 0800 by Agustín Espinzoa, RN  Trust Relationship/Rapport:   care explained   reassurance provided   Goal Outcome Evaluation:  Plan of Care Reviewed With: patient           Outcome Evaluation: VSS. A&O x4. No issues or complaints from the patient at this time. Pt tolerated PT well and ate her meals. Call light within reach. Bed alarm set.

## 2023-11-19 LAB
ALBUMIN SERPL-MCNC: 3.8 G/DL (ref 3.5–5.2)
ANION GAP SERPL CALCULATED.3IONS-SCNC: 15.2 MMOL/L (ref 5–15)
BASOPHILS # BLD MANUAL: 0.44 10*3/MM3 (ref 0–0.2)
BASOPHILS NFR BLD MANUAL: 1 % (ref 0–1.5)
BUN SERPL-MCNC: 18 MG/DL (ref 8–23)
BUN/CREAT SERPL: 18.2 (ref 7–25)
CALCIUM SPEC-SCNC: 9 MG/DL (ref 8.6–10.5)
CHLORIDE SERPL-SCNC: 96 MMOL/L (ref 98–107)
CO2 SERPL-SCNC: 27.8 MMOL/L (ref 22–29)
CREAT SERPL-MCNC: 0.99 MG/DL (ref 0.57–1)
DEPRECATED RDW RBC AUTO: 60.1 FL (ref 37–54)
EGFRCR SERPLBLD CKD-EPI 2021: 56 ML/MIN/1.73
ERYTHROCYTE [DISTWIDTH] IN BLOOD BY AUTOMATED COUNT: 18.3 % (ref 12.3–15.4)
GLUCOSE BLDC GLUCOMTR-MCNC: 305 MG/DL (ref 70–130)
GLUCOSE SERPL-MCNC: 123 MG/DL (ref 65–99)
HCT VFR BLD AUTO: 26.5 % (ref 34–46.6)
HGB BLD-MCNC: 8.4 G/DL (ref 12–15.9)
LYMPHOCYTES # BLD MANUAL: 0.44 10*3/MM3 (ref 0.7–3.1)
LYMPHOCYTES NFR BLD MANUAL: 12.1 % (ref 5–12)
MCH RBC QN AUTO: 29.6 PG (ref 26.6–33)
MCHC RBC AUTO-ENTMCNC: 31.7 G/DL (ref 31.5–35.7)
MCV RBC AUTO: 93.3 FL (ref 79–97)
METAMYELOCYTES NFR BLD MANUAL: 1 % (ref 0–0)
MONOCYTES # BLD: 5.35 10*3/MM3 (ref 0.1–0.9)
MYELOCYTES NFR BLD MANUAL: 5.1 % (ref 0–0)
NEUTROPHILS # BLD AUTO: 35.28 10*3/MM3 (ref 1.7–7)
NEUTROPHILS NFR BLD MANUAL: 79.8 % (ref 42.7–76)
PHOSPHATE SERPL-MCNC: 2.7 MG/DL (ref 2.5–4.5)
PLAT MORPH BLD: NORMAL
PLATELET # BLD AUTO: 208 10*3/MM3 (ref 140–450)
PMV BLD AUTO: 11.8 FL (ref 6–12)
POTASSIUM SERPL-SCNC: 3.5 MMOL/L (ref 3.5–5.2)
POTASSIUM SERPL-SCNC: 5.1 MMOL/L (ref 3.5–5.2)
RBC # BLD AUTO: 2.84 10*6/MM3 (ref 3.77–5.28)
RBC MORPH BLD: NORMAL
SODIUM SERPL-SCNC: 139 MMOL/L (ref 136–145)
VARIANT LYMPHS NFR BLD MANUAL: 1 % (ref 19.6–45.3)
WBC MORPH BLD: NORMAL
WBC NRBC COR # BLD AUTO: 44.21 10*3/MM3 (ref 3.4–10.8)

## 2023-11-19 PROCEDURE — 80069 RENAL FUNCTION PANEL: CPT | Performed by: INTERNAL MEDICINE

## 2023-11-19 PROCEDURE — 84132 ASSAY OF SERUM POTASSIUM: CPT | Performed by: HOSPITALIST

## 2023-11-19 PROCEDURE — 99231 SBSQ HOSP IP/OBS SF/LOW 25: CPT | Performed by: INTERNAL MEDICINE

## 2023-11-19 PROCEDURE — 63710000001 PREDNISONE PER 5 MG: Performed by: STUDENT IN AN ORGANIZED HEALTH CARE EDUCATION/TRAINING PROGRAM

## 2023-11-19 PROCEDURE — 85025 COMPLETE CBC W/AUTO DIFF WBC: CPT | Performed by: INTERNAL MEDICINE

## 2023-11-19 PROCEDURE — 94799 UNLISTED PULMONARY SVC/PX: CPT

## 2023-11-19 PROCEDURE — 99232 SBSQ HOSP IP/OBS MODERATE 35: CPT | Performed by: INTERNAL MEDICINE

## 2023-11-19 PROCEDURE — 82948 REAGENT STRIP/BLOOD GLUCOSE: CPT

## 2023-11-19 PROCEDURE — 85007 BL SMEAR W/DIFF WBC COUNT: CPT | Performed by: INTERNAL MEDICINE

## 2023-11-19 RX ORDER — ALBUTEROL SULFATE 2.5 MG/3ML
2.5 SOLUTION RESPIRATORY (INHALATION) EVERY 6 HOURS PRN
Status: DISCONTINUED | OUTPATIENT
Start: 2023-11-19 | End: 2023-11-22 | Stop reason: HOSPADM

## 2023-11-19 RX ORDER — BUDESONIDE AND FORMOTEROL FUMARATE DIHYDRATE 160; 4.5 UG/1; UG/1
2 AEROSOL RESPIRATORY (INHALATION)
Status: DISCONTINUED | OUTPATIENT
Start: 2023-11-19 | End: 2023-11-22 | Stop reason: HOSPADM

## 2023-11-19 RX ORDER — METOPROLOL SUCCINATE 25 MG/1
12.5 TABLET, EXTENDED RELEASE ORAL NIGHTLY
Status: DISCONTINUED | OUTPATIENT
Start: 2023-11-19 | End: 2023-11-22 | Stop reason: HOSPADM

## 2023-11-19 RX ORDER — POTASSIUM CHLORIDE 750 MG/1
40 TABLET, FILM COATED, EXTENDED RELEASE ORAL EVERY 4 HOURS
Status: DISPENSED | OUTPATIENT
Start: 2023-11-19 | End: 2023-11-19

## 2023-11-19 RX ADMIN — Medication 250 MG: at 21:19

## 2023-11-19 RX ADMIN — Medication 250 MG: at 09:19

## 2023-11-19 RX ADMIN — SERTRALINE 100 MG: 100 TABLET, FILM COATED ORAL at 09:19

## 2023-11-19 RX ADMIN — METOPROLOL SUCCINATE 25 MG: 25 TABLET, EXTENDED RELEASE ORAL at 09:19

## 2023-11-19 RX ADMIN — METOPROLOL SUCCINATE 12.5 MG: 25 TABLET, EXTENDED RELEASE ORAL at 21:19

## 2023-11-19 RX ADMIN — LATANOPROST 1 DROP: 50 SOLUTION/ DROPS OPHTHALMIC at 21:20

## 2023-11-19 RX ADMIN — HYDROCODONE BITARTRATE AND HOMATROPINE METHYLBROMIDE 5 ML: 1.5; 5 SYRUP ORAL at 23:58

## 2023-11-19 RX ADMIN — SIMETHICONE 80 MG: 80 TABLET, CHEWABLE ORAL at 11:15

## 2023-11-19 RX ADMIN — PREDNISONE 50 MG: 50 TABLET ORAL at 09:19

## 2023-11-19 RX ADMIN — BUDESONIDE AND FORMOTEROL FUMARATE DIHYDRATE 2 PUFF: 160; 4.5 AEROSOL RESPIRATORY (INHALATION) at 20:04

## 2023-11-19 RX ADMIN — FUROSEMIDE 10 MG: 20 TABLET ORAL at 09:19

## 2023-11-19 RX ADMIN — APIXABAN 5 MG: 5 TABLET, FILM COATED ORAL at 09:19

## 2023-11-19 RX ADMIN — ASPIRIN 81 MG: 81 TABLET, COATED ORAL at 09:19

## 2023-11-19 RX ADMIN — FAMOTIDINE 20 MG: 20 TABLET ORAL at 09:19

## 2023-11-19 RX ADMIN — IPRATROPIUM BROMIDE AND ALBUTEROL SULFATE 3 ML: 2.5; .5 SOLUTION RESPIRATORY (INHALATION) at 07:24

## 2023-11-19 RX ADMIN — APIXABAN 5 MG: 5 TABLET, FILM COATED ORAL at 21:19

## 2023-11-19 RX ADMIN — POTASSIUM CHLORIDE 40 MEQ: 750 TABLET, EXTENDED RELEASE ORAL at 09:20

## 2023-11-19 NOTE — PROGRESS NOTES
[facility]     LOS: 17 days     Subjective:    CC:Abdominal Pain, Nausea, and Vomiting    DIET:  Diet Order   Procedures    Diet: Cardiac Diets, Diabetic Diets; Healthy Heart (2-3 Na+); Consistent Carbohydrate; Texture: Regular Texture (IDDSI 7); Fluid Consistency: Thin (IDDSI 0)     No new complaints    Objective:    Vital Signs:  Temp:  [97.5 °F (36.4 °C)-98.4 °F (36.9 °C)] 97.9 °F (36.6 °C)  Heart Rate:  [] 88  Resp:  [16-18] 18  BP: (103-118)/(48-64) 103/48    SpO2:  [87 %-95 %] 95 %  on  Flow (L/min):  [3] 3;   Device (Oxygen Therapy): nasal cannula  Body mass index is 24.2 kg/m².    Physical Exam  Constitutional:       Appearance: Normal appearance.   HENT:      Head: Normocephalic and atraumatic.   Cardiovascular:      Rate and Rhythm: Normal rate and regular rhythm.      Heart sounds: No murmur heard.     No friction rub.   Pulmonary:      Effort: Pulmonary effort is normal.      Breath sounds: Wheezing present.   Abdominal:      General: Bowel sounds are normal. There is no distension.      Palpations: Abdomen is soft.      Tenderness: There is no abdominal tenderness.   Skin:     General: Skin is warm and dry.   Neurological:      General: No focal deficit present.      Mental Status: She is alert.         Results Review:    Glucose   Date Value Ref Range Status   11/19/2023 123 (H) 65 - 99 mg/dL Final   11/18/2023 152 (H) 65 - 99 mg/dL Final   11/17/2023 102 (H) 65 - 99 mg/dL Final     Results from last 7 days   Lab Units 11/19/23  0501   WBC 10*3/mm3 44.21*   HEMOGLOBIN g/dL 8.4*   HEMATOCRIT % 26.5*   PLATELETS 10*3/mm3 208     Results from last 7 days   Lab Units 11/19/23  0501   SODIUM mmol/L 139   POTASSIUM mmol/L 3.5   CHLORIDE mmol/L 96*   CO2 mmol/L 27.8   BUN mg/dL 18   CREATININE mg/dL 0.99   CALCIUM mg/dL 9.0   GLUCOSE mg/dL 123*         Results from last 7 days   Lab Units 11/15/23  2252   MAGNESIUM mg/dL 2.1     Results from last 7 days   Lab Units  "11/15/23  2349 11/15/23  2252 11/13/23  1320   HSTROP T ng/L 33* 33* 27*     Cultures:  No results found for: \"BLOODCX\", \"URINECX\", \"WOUNDCX\", \"MRSACX\", \"RESPCX\", \"STOOLCX\"    I have reviewed daily medications and changes in CPOE    Scheduled meds  apixaban, 5 mg, Oral, Q12H  aspirin, 81 mg, Oral, Daily  budesonide-formoterol, 2 puff, Inhalation, BID - RT  famotidine, 20 mg, Oral, Daily  furosemide, 10 mg, Oral, Daily  latanoprost, 1 drop, Both Eyes, Nightly  lidocaine, 20 mL, Infiltration, Once  metoprolol succinate XL, 25 mg, Oral, Daily  Momelotinib Dihydrochloride, 200 mg, Oral, Daily  potassium chloride ER, 40 mEq, Oral, Q4H  predniSONE, 50 mg, Oral, Daily With Breakfast   Followed by  [START ON 11/20/2023] predniSONE, 40 mg, Oral, Daily With Breakfast   Followed by  [START ON 11/23/2023] predniSONE, 30 mg, Oral, Daily With Breakfast   Followed by  [START ON 11/26/2023] predniSONE, 20 mg, Oral, Daily With Breakfast   Followed by  [START ON 11/29/2023] predniSONE, 10 mg, Oral, Daily With Breakfast  saccharomyces boulardii, 250 mg, Oral, BID  senna-docusate sodium, 2 tablet, Oral, BID  sertraline, 100 mg, Oral, Daily  tiotropium bromide monohydrate, 2 puff, Inhalation, Daily - RT           PRN meds    acetaminophen    albuterol    senna-docusate sodium **AND** polyethylene glycol **AND** bisacodyl **AND** bisacodyl    HYDROcodone Bit-Homatrop MBr    LORazepam    melatonin    nitroglycerin    ondansetron **OR** ondansetron    Phosphorus Replacement - Follow Nurse / BPA Driven Protocol    Potassium Replacement - Follow Nurse / BPA Driven Protocol    simethicone    Insert Peripheral IV **AND** sodium chloride        Acute kidney injury    CAD (coronary artery disease)    Paroxysmal atrial fibrillation    Myeloproliferative disorder    Type 2 diabetes mellitus with circulatory disorder, without long-term current use of insulin    Hypertension    Personal history of transient ischemic attack (TIA), and cerebral " infarction without residual deficits    Myelofibrosis    Pulmonary infiltrate    Iatrogenic pneumothorax        Assessment/Plan:      83 y.o. female admitted with Acute kidney injury.     Acute Cystitis without Hematuria  - Urine culture grew ESBL.  - Completed ertapenem     KARLI  - Resolved with IVF  - Order repeat BMP in AM for reassessment.  - Appreciate nephrology recs- lasix restarted     Lung mass  Bilateral pulmonary infiltrates  - s/p bronchoscopy 11/8/23 with biopsies, pathology showing no evidence of malignancy but only 3 biopsies taken  - has small iatrogenic pneumothorax, improved with conservative treatment  - follow up chest CT scan without contrast in 6-8 weeks  - appreciate pulmonology recs, they have signed off-follow up with [facility] for Dr. Billings, in 8 weeks  - transition patient from IV steroids to oral steroid taper     Aortic Stenosis/Chronic Diastolic CHF  Coronary artery disease  Paroxysmal atrial fibrillation  Hypertension  - Continue Eliquis, lasix, metoprolol  - no plavix at DC- cards rec resuming ASA 81mg- started 11/15   -With chest pain this morning, troponin is stable/consistent with previous troponin, EKG with sinus tach, no ischemic changes noted.     Myeloproliferative disorder/Anemia  Functional iron deficiency  Leukocytosis  - Has chronic leukocytosis, values elevated on most recent labs, however, relatively stable from prior.  - s/p 1 unit of PRBCs 11/6/23 and 11/7/23 with good response; also received ferrelicit  - oncology evaluated, o/p f/u planned- signed off  - daily cbc     Anxiety  -Start low-dose Ativan while inpatient 0.25mg q6h prn     HR high at times, stop duonebs, start symbicort and spiriva  Cardiology re-eval, consider increasing metoprolol vs monitoring to see if improved with stopping duonebs        Eduard Rees MD  11/19/23  08:38 EST

## 2023-11-19 NOTE — PLAN OF CARE
Problem: Adult Inpatient Plan of Care  Goal: Plan of Care Review  Outcome: Ongoing, Progressing  Flowsheets (Taken 11/19/2023 4029)  Progress: improving  Plan of Care Reviewed With: patient  Outcome Evaluation: VSS. A&O x4. No complaints of pain during the shift. Spoke to pt's daughter again about her chemo pills and she said that they would try and bring them in tonight or tomorrow. No questions or concerns at this time from pt or family. Bed alarm set. Call light within reach.   Goal Outcome Evaluation:  Plan of Care Reviewed With: patient        Progress: improving  Outcome Evaluation: VSS. A&O x4. No complaints of pain during the shift. Spoke to pt's daughter again about her chemo pills and she said that they would try and bring them in tonight or tomorrow. No questions or concerns at this time from pt or family. Bed alarm set. Call light within reach.

## 2023-11-19 NOTE — PLAN OF CARE
Goal Outcome Evaluation:  Plan of Care Reviewed With: patient           Outcome Evaluation: VSS, no complaints of chest pain, Ativan PRN, pt out of chemo pills, daughter aware, bed alarm on, call light in reach,

## 2023-11-19 NOTE — PROGRESS NOTES
CC: Myelofibrosis    Subjective:  Afebrile. Remains intermittently tachycardic and requiring O2.    Objective:    Exam:  She's awake and alert today; believes she's improving.     WBC stable at 44. Hgb stable at 8.4    Assessment/Plan:  Melofibrosis  Continue momelotinib for myelofibrosis.    Anemia  No transfusion needed.    Awaiting placement. Will be available for her to see me in the office when she's ready to return.     Will sign off. Call us back if needed. Discussed with her and her nurse today.

## 2023-11-19 NOTE — PROGRESS NOTES
"RENAL/[facility]:     LOS: 17 days     Chief Complaint/ Reason for encounter: KARLI    Subjective   Chart reviewed  No complaints    Vital Signs  Temp:  [97.3 °F (36.3 °C)-98.4 °F (36.9 °C)] 97.3 °F (36.3 °C)  Heart Rate:  [] 88  Resp:  [16-18] 17  BP: (103-118)/(48-95) 112/95       Wt Readings from Last 1 Encounters:   11/19/23 0516 63.5 kg (140 lb)   11/18/23 0528 63.5 kg (140 lb)   11/16/23 0548 63.6 kg (140 lb 3.4 oz)   11/15/23 0600 63.7 kg (140 lb 6.9 oz)   11/14/23 0600 63.2 kg (139 lb 5.3 oz)   11/13/23 0600 66.4 kg (146 lb 6.2 oz)   11/12/23 0509 66.7 kg (147 lb)   11/11/23 0545 67.1 kg (147 lb 14.9 oz)   11/10/23 0600 67.8 kg (149 lb 7.6 oz)   11/09/23 0600 67.7 kg (149 lb 4 oz)   11/08/23 0428 67.7 kg (149 lb 4 oz)   11/07/23 0600 67.6 kg (149 lb 0.5 oz)   11/06/23 0605 67.6 kg (149 lb 0.5 oz)   11/05/23 0507 62.6 kg (138 lb)   11/04/23 0539 62.6 kg (138 lb)   11/03/23 0556 63 kg (138 lb 14.2 oz)       Objective:  Vital signs: (most recent): Blood pressure 112/95, pulse 88, temperature 97.3 °F (36.3 °C), temperature source Oral, resp. rate 17, height 162 cm (63.78\"), weight 63.5 kg (140 lb), SpO2 94%.                Objective:  General Appearance:  Comfortable, well-appearing, in no acute distress and not in pain.  Awake, alert, oriented. Eating lunch   HEENT: Mucous membranes moist, no injury, oropharynx clear  Lungs:  Normal effort and normal respiratory rate.  Breath sounds clear to auscultation.  No  respiratory distress.  No rales, decreased breath sounds or rhonchi.    Heart: Normal rate.  Regular rhythm.  S1, S2 normal.  No murmur.   Abdomen: Abdomen is soft.  Bowel sounds are normal, no abdominal tenderness.  There is no rebound or guarding  Extremities: no edema of bilateral lower extremities  Neurological: No focal motor or sensory deficits, pupils reactive  Skin:  Warm and dry.  No rash or cyanosis.       Results Review:    Intake/Output:   No intake or output data in the 24 hours ending 11/19/23 " 1104        Labs:   Recent Results (from the past 24 hour(s))   Renal Function Panel    Collection Time: 11/19/23  5:01 AM    Specimen: Blood   Result Value Ref Range    Glucose 123 (H) 65 - 99 mg/dL    BUN 18 8 - 23 mg/dL    Creatinine 0.99 0.57 - 1.00 mg/dL    Sodium 139 136 - 145 mmol/L    Potassium 3.5 3.5 - 5.2 mmol/L    Chloride 96 (L) 98 - 107 mmol/L    CO2 27.8 22.0 - 29.0 mmol/L    Calcium 9.0 8.6 - 10.5 mg/dL    Albumin 3.8 3.5 - 5.2 g/dL    Phosphorus 2.7 2.5 - 4.5 mg/dL    Anion Gap 15.2 (H) 5.0 - 15.0 mmol/L    BUN/Creatinine Ratio 18.2 7.0 - 25.0    eGFR 56.0 (L) >60.0 mL/min/1.73   CBC Auto Differential    Collection Time: 11/19/23  5:01 AM    Specimen: Blood   Result Value Ref Range    WBC 44.21 (C) 3.40 - 10.80 10*3/mm3    RBC 2.84 (L) 3.77 - 5.28 10*6/mm3    Hemoglobin 8.4 (L) 12.0 - 15.9 g/dL    Hematocrit 26.5 (L) 34.0 - 46.6 %    MCV 93.3 79.0 - 97.0 fL    MCH 29.6 26.6 - 33.0 pg    MCHC 31.7 31.5 - 35.7 g/dL    RDW 18.3 (H) 12.3 - 15.4 %    RDW-SD 60.1 (H) 37.0 - 54.0 fl    MPV 11.8 6.0 - 12.0 fL    Platelets 208 140 - 450 10*3/mm3   Manual Differential    Collection Time: 11/19/23  5:01 AM    Specimen: Blood   Result Value Ref Range    Neutrophil % 79.8 (H) 42.7 - 76.0 %    Lymphocyte % 1.0 (L) 19.6 - 45.3 %    Monocyte % 12.1 (H) 5.0 - 12.0 %    Basophil % 1.0 0.0 - 1.5 %    Metamyelocyte % 1.0 (H) 0.0 - 0.0 %    Myelocyte % 5.1 (H) 0.0 - 0.0 %    Neutrophils Absolute 35.28 (H) 1.70 - 7.00 10*3/mm3    Lymphocytes Absolute 0.44 (L) 0.70 - 3.10 10*3/mm3    Monocytes Absolute 5.35 (H) 0.10 - 0.90 10*3/mm3    Basophils Absolute 0.44 (H) 0.00 - 0.20 10*3/mm3    RBC Morphology Normal Normal    WBC Morphology Normal Normal    Platelet Morphology Normal Normal       ASSESSMENT:  Acute kidney injury, prerenal, rapidly improving and at baseline  Chronic diastolic CHF - EF 55%   Nausea vomiting and dehydration, better  Left lingular lung mass  Myeloproliferative disorder, hematology now  following  Chronic leukocytosis  Atrial fibrillation  Diarrhea, slowly resolving  Acute on chronic anemia  Diabetes mellitus  UTI on ertapenem, ESBL E. coli  Lung mass, biopsy 11/8  Postop pneumothorax, had improved by the time chest tube was to be placed      DISCUSSION/PLAN:   Renal function, volume remain at goal  K 3.5 - replaced  Continue current, low-dose oral diuretic dose for now  BP at goal  We will plan to maintain current diuretic dose at discharge  Discharge planning, okay from renal standpoint at any time      Jn Riley MD  [facility]   Office phone number: [phone]  Answering service phone number: [phone]    11/19/23  11:04 EST

## 2023-11-19 NOTE — PLAN OF CARE
Problem: Adult Inpatient Plan of Care  Goal: Plan of Care Review  Outcome: Ongoing, Progressing  Flowsheets (Taken 11/18/2023 1901)  Progress: improving  Plan of Care Reviewed With: patient  Outcome Evaluation: VSS. A&O x4. No issues or complaints from the patient this shift. Pt did become tearful when discussing her transition to a SNF but was easily redirected. Call light within reach. Bed alarm set.     Problem: Skin Injury Risk Increased  Goal: Skin Health and Integrity  Intervention: Optimize Skin Protection  Recent Flowsheet Documentation  Taken 11/18/2023 1800 by Agustín Espinoza, RN  Head of Bed (HOB) Positioning: HOB at 20-30 degrees  Taken 11/18/2023 1600 by Agustín Espinoza, RN  Head of Bed (HOB) Positioning: HOB at 20-30 degrees   Goal Outcome Evaluation:  Plan of Care Reviewed With: patient        Progress: improving  Outcome Evaluation: VSS. A&O x4. No issues or complaints from the patient this shift. Pt did become tearful when discussing her transition to a SNF but was easily redirected. Call light within reach. Bed alarm set.

## 2023-11-19 NOTE — PROGRESS NOTES
"   LOS: 17 days   Patient Care Team:  Kristi Moreau APRN as PCP - General (Nurse Practitioner)  Gerard Foreman MD as Referring Physician (Medical Oncology)  Darrell Reinoso MD as Consulting Physician (Hematology and Oncology)  Albin Barriga MD as Cardiologist (Cardiology)  Richard Aldana, RN as     Chief Complaint: AF       Interval History: No angina, has bursts of AF. Very weak.      Objective   Vital Signs  Temp:  [97.3 °F (36.3 °C)-98.6 °F (37 °C)] 98.6 °F (37 °C)  Heart Rate:  [] 100  Resp:  [16-18] 16  BP: (103-119)/(48-95) 119/64  No intake or output data in the 24 hours ending 11/19/23 1525    Last Weight and Admission Weight        11/19/23  0516   Weight: 63.5 kg (140 lb)     Flowsheet Rows      Flowsheet Row First Filed Value   Admission Height 162 cm (63.78\") Documented at 11/10/2023 0847   Admission Weight 63 kg (138 lb 14.2 oz) Documented at 11/03/2023 0556                    Physical Exam  Constitutional:       Comments: Weak/frail   Cardiovascular:      Rate and Rhythm: Normal rate.   Pulmonary:      Effort: Pulmonary effort is normal.      Breath sounds: Normal breath sounds.   Musculoskeletal:         General: No swelling.         Results Review:      Results from last 7 days   Lab Units 11/19/23  0501 11/18/23  0540 11/17/23  0527   SODIUM mmol/L 139 138 139   POTASSIUM mmol/L 3.5 3.8 4.1   CHLORIDE mmol/L 96* 94* 98   CO2 mmol/L 27.8 27.8 32.0*   BUN mg/dL 18 17 19   CREATININE mg/dL 0.99 0.77 0.90   GLUCOSE mg/dL 123* 152* 102*   CALCIUM mg/dL 9.0 9.0 9.2     Results from last 7 days   Lab Units 11/15/23  2349 11/15/23  2252 11/13/23  1320   HSTROP T ng/L 33* 33* 27*     Results from last 7 days   Lab Units 11/19/23  0501 11/18/23  0540 11/17/23  0527   WBC 10*3/mm3 44.21* 42.55* 41.55*   HEMOGLOBIN g/dL 8.4* 8.3* 7.8*   HEMATOCRIT % 26.5* 26.8* 25.5*   PLATELETS 10*3/mm3 208 199 200             Results from last 7 days   Lab Units 11/15/23  2252   MAGNESIUM mg/dL 2.1       "     I reviewed the patient's new clinical results.  I personally viewed and interpreted the patient's EKG/Telemetry data        Medication Review:   apixaban, 5 mg, Oral, Q12H  aspirin, 81 mg, Oral, Daily  budesonide-formoterol, 2 puff, Inhalation, BID - RT  famotidine, 20 mg, Oral, Daily  furosemide, 10 mg, Oral, Daily  latanoprost, 1 drop, Both Eyes, Nightly  lidocaine, 20 mL, Infiltration, Once  metoprolol succinate XL, 25 mg, Oral, Daily  Momelotinib Dihydrochloride, 200 mg, Oral, Daily  potassium chloride ER, 40 mEq, Oral, Q4H  [START ON 11/20/2023] predniSONE, 40 mg, Oral, Daily With Breakfast   Followed by  [START ON 11/23/2023] predniSONE, 30 mg, Oral, Daily With Breakfast   Followed by  [START ON 11/26/2023] predniSONE, 20 mg, Oral, Daily With Breakfast   Followed by  [START ON 11/29/2023] predniSONE, 10 mg, Oral, Daily With Breakfast  saccharomyces boulardii, 250 mg, Oral, BID  senna-docusate sodium, 2 tablet, Oral, BID  sertraline, 100 mg, Oral, Daily  tiotropium bromide monohydrate, 2 puff, Inhalation, Daily - RT             Assessment & Plan     1. PAF  2. CAD  3. Anemia  4. Myeloproliferative disorder  5. KARLI    She has a known history of atrial fibrillation, and it is not surprising that she has breakthrough episodes.  These episodes are very very brief.  Until the last few days, her blood pressure was fairly low, but she has been relatively well controlled over the last 24 hours.  I think we can gingerly add an additional tiny dose of metoprolol succinate at night, 12.5 mg, in addition to the 25 mg she received in the morning.  However, we will not be able to keep her from having short paroxysmal atrial fibrillation, nor should that be our goal.  I do want to avoid prolonged episodes of rapid rates, as she does tend to get angina when that happens, and she is already at risk for angina given her significant anemia.  Continue apixaban at her current dose.    Please call us if further issues arise,  otherwise we will see her as needed.      Albin Barriga MD  11/19/23  15:25 EST

## 2023-11-20 ENCOUNTER — APPOINTMENT (OUTPATIENT)
Dept: GENERAL RADIOLOGY | Facility: HOSPITAL | Age: 83
End: 2023-11-20
Payer: MEDICARE

## 2023-11-20 LAB
ALBUMIN SERPL-MCNC: 3.9 G/DL (ref 3.5–5.2)
ANION GAP SERPL CALCULATED.3IONS-SCNC: 13.1 MMOL/L (ref 5–15)
BUN SERPL-MCNC: 18 MG/DL (ref 8–23)
BUN/CREAT SERPL: 20.9 (ref 7–25)
CALCIUM SPEC-SCNC: 9.4 MG/DL (ref 8.6–10.5)
CHLORIDE SERPL-SCNC: 93 MMOL/L (ref 98–107)
CO2 SERPL-SCNC: 29.9 MMOL/L (ref 22–29)
CREAT SERPL-MCNC: 0.86 MG/DL (ref 0.57–1)
DEPRECATED RDW RBC AUTO: 61.5 FL (ref 37–54)
EGFRCR SERPLBLD CKD-EPI 2021: 66.3 ML/MIN/1.73
EOSINOPHIL # BLD MANUAL: 0.6 10*3/MM3 (ref 0–0.4)
EOSINOPHIL NFR BLD MANUAL: 1 % (ref 0.3–6.2)
ERYTHROCYTE [DISTWIDTH] IN BLOOD BY AUTOMATED COUNT: 18.4 % (ref 12.3–15.4)
GLUCOSE SERPL-MCNC: 123 MG/DL (ref 65–99)
HCT VFR BLD AUTO: 29.7 % (ref 34–46.6)
HGB BLD-MCNC: 9.4 G/DL (ref 12–15.9)
LYMPHOCYTES # BLD MANUAL: 2.42 10*3/MM3 (ref 0.7–3.1)
LYMPHOCYTES NFR BLD MANUAL: 4 % (ref 5–12)
MCH RBC QN AUTO: 29.4 PG (ref 26.6–33)
MCHC RBC AUTO-ENTMCNC: 31.6 G/DL (ref 31.5–35.7)
MCV RBC AUTO: 92.8 FL (ref 79–97)
METAMYELOCYTES NFR BLD MANUAL: 1 % (ref 0–0)
MONOCYTES # BLD: 2.42 10*3/MM3 (ref 0.1–0.9)
MYELOCYTES NFR BLD MANUAL: 2 % (ref 0–0)
NEUTROPHILS # BLD AUTO: 53.14 10*3/MM3 (ref 1.7–7)
NEUTROPHILS NFR BLD MANUAL: 88 % (ref 42.7–76)
PHOSPHATE SERPL-MCNC: 2.6 MG/DL (ref 2.5–4.5)
PLAT MORPH BLD: NORMAL
PLATELET # BLD AUTO: 241 10*3/MM3 (ref 140–450)
PMV BLD AUTO: 11.9 FL (ref 6–12)
POTASSIUM SERPL-SCNC: 3.9 MMOL/L (ref 3.5–5.2)
RBC # BLD AUTO: 3.2 10*6/MM3 (ref 3.77–5.28)
RBC MORPH BLD: NORMAL
SODIUM SERPL-SCNC: 136 MMOL/L (ref 136–145)
VARIANT LYMPHS NFR BLD MANUAL: 4 % (ref 19.6–45.3)
WBC MORPH BLD: NORMAL
WBC NRBC COR # BLD AUTO: 60.39 10*3/MM3 (ref 3.4–10.8)

## 2023-11-20 PROCEDURE — 71045 X-RAY EXAM CHEST 1 VIEW: CPT

## 2023-11-20 PROCEDURE — 63710000001 PREDNISONE PER 1 MG: Performed by: STUDENT IN AN ORGANIZED HEALTH CARE EDUCATION/TRAINING PROGRAM

## 2023-11-20 PROCEDURE — 97116 GAIT TRAINING THERAPY: CPT

## 2023-11-20 PROCEDURE — 94799 UNLISTED PULMONARY SVC/PX: CPT

## 2023-11-20 PROCEDURE — 94761 N-INVAS EAR/PLS OXIMETRY MLT: CPT

## 2023-11-20 PROCEDURE — 94664 DEMO&/EVAL PT USE INHALER: CPT

## 2023-11-20 PROCEDURE — 85025 COMPLETE CBC W/AUTO DIFF WBC: CPT | Performed by: INTERNAL MEDICINE

## 2023-11-20 PROCEDURE — 97110 THERAPEUTIC EXERCISES: CPT

## 2023-11-20 PROCEDURE — 80069 RENAL FUNCTION PANEL: CPT | Performed by: INTERNAL MEDICINE

## 2023-11-20 PROCEDURE — 85007 BL SMEAR W/DIFF WBC COUNT: CPT | Performed by: INTERNAL MEDICINE

## 2023-11-20 RX ADMIN — TIOTROPIUM BROMIDE INHALATION SPRAY 2 PUFF: 3.12 SPRAY, METERED RESPIRATORY (INHALATION) at 07:34

## 2023-11-20 RX ADMIN — BUDESONIDE AND FORMOTEROL FUMARATE DIHYDRATE 2 PUFF: 160; 4.5 AEROSOL RESPIRATORY (INHALATION) at 07:33

## 2023-11-20 RX ADMIN — FUROSEMIDE 10 MG: 20 TABLET ORAL at 09:04

## 2023-11-20 RX ADMIN — METOPROLOL SUCCINATE 25 MG: 25 TABLET, EXTENDED RELEASE ORAL at 09:03

## 2023-11-20 RX ADMIN — FAMOTIDINE 20 MG: 20 TABLET ORAL at 09:03

## 2023-11-20 RX ADMIN — APIXABAN 5 MG: 5 TABLET, FILM COATED ORAL at 20:00

## 2023-11-20 RX ADMIN — ASPIRIN 81 MG: 81 TABLET, COATED ORAL at 09:02

## 2023-11-20 RX ADMIN — APIXABAN 5 MG: 5 TABLET, FILM COATED ORAL at 09:03

## 2023-11-20 RX ADMIN — METOPROLOL SUCCINATE 12.5 MG: 25 TABLET, EXTENDED RELEASE ORAL at 20:00

## 2023-11-20 RX ADMIN — Medication 250 MG: at 09:03

## 2023-11-20 RX ADMIN — SERTRALINE 100 MG: 100 TABLET, FILM COATED ORAL at 09:03

## 2023-11-20 RX ADMIN — LATANOPROST 1 DROP: 50 SOLUTION/ DROPS OPHTHALMIC at 20:00

## 2023-11-20 RX ADMIN — PREDNISONE 40 MG: 20 TABLET ORAL at 09:02

## 2023-11-20 RX ADMIN — BUDESONIDE AND FORMOTEROL FUMARATE DIHYDRATE 2 PUFF: 160; 4.5 AEROSOL RESPIRATORY (INHALATION) at 20:08

## 2023-11-20 RX ADMIN — Medication 250 MG: at 20:00

## 2023-11-20 NOTE — PLAN OF CARE
Goal Outcome Evaluation:  Plan of Care Reviewed With: patient        Progress: improving  Outcome Evaluation: Patient tolerated treatment well, performed UB exercises while in bed, patient requests not to get up due to already having been up with physical therapy. Patient performed exercises to address shoulders, back, chest, core, and arms. Requires intermittent rest breaks due to fatigue. Patient will continue to benefit from skilled OT to address remaining functional deficits, recommend SNF at NH.      Anticipated Discharge Disposition (OT): skilled nursing facility

## 2023-11-20 NOTE — CASE MANAGEMENT/SOCIAL WORK
Continued Stay Note  [facility]     Patient Name: Catherine Boyer  MRN: [id number]  Today's Date: 11/20/2023    Admit Date: 11/2/2023    Plan: [facility] PENIDNG expedited appeal.   Discharge Plan       Row Name 11/20/23 1655       Plan    Plan [facility] PENDING expedited appeal.    Plan Comments Pt's insurance offered a P2P which was done today and they upheld the denial.  Pt's dtr Ila notified.  Family will do an expedited appeal ([phone], reference # [id number]). ........Cherry CONTRERAS/ RENITA                   Discharge Codes    No documentation.                 Expected Discharge Date and Time       Expected Discharge Date Expected Discharge Time    Nov 21, 2023               Cherry Bowen RN

## 2023-11-20 NOTE — THERAPY TREATMENT NOTE
Patient Name: Catherine Boyer  : 1940    MRN: [id number]                              Today's Date: 2023       Admit Date: 2023    Visit Dx:     ICD-10-CM ICD-9-CM   1. Acute renal failure, unspecified acute renal failure type  N17.9 584.9   2. Acute abdominal pain  R10.9 789.00     338.19   3. Nausea vomiting and diarrhea  R11.2 787.91    R19.7 787.01   4. Pulmonary infiltrate  R91.8 793.19     Patient Active Problem List   Diagnosis    Acute on chronic diastolic heart failure    CAD (coronary artery disease)    Nonbacterial thrombotic endocarditis    Paroxysmal atrial fibrillation    Myeloproliferative disorder    Microcytic anemia    Type 2 diabetes mellitus with circulatory disorder, without long-term current use of insulin    GERD (gastroesophageal reflux disease)    Hypertension    Personal history of transient ischemic attack (TIA), 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 deficits    Porcelain gallbladder    Breast cancer    Atherosclerosis of abdominal aorta    APC (atrial premature contractions)    Moderate malnutrition    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    Elevated troponin    TIA (transient ischemic attack)    PUD (peptic ulcer disease)    Anemia    Tachycardia    Myelofibrosis    Normocytic anemia    Acute kidney injury    Pulmonary infiltrate    Iatrogenic pneumothorax     Past Medical History:   Diagnosis Date    Atherosclerosis of abdominal aorta 2023    Atrial fibrillation     Breast cancer     CAD (coronary artery disease)     NSTEMI 2022: 90% ostial LAD, 99% D1, 70% mid-distal LAD (medical therapy). She received two stents (2.5x18 and 2.5x26mm Finesse LEFTY) but I don't know which one went to which lesion.    Carotid atherosclerosis     Cholecystitis 2022    Added automatically from request for surgery [id number]    Chronic diastolic (congestive) heart failure     COVID 10/29/2022    GERD (gastroesophageal reflux disease)     Glaucoma     History of cataract     Hypertension      Microcytic anemia     per Dr. oleg pate office  note 6/30/22-dd    Myeloproliferative disorder     JAK2 positive    Nonbacterial thrombotic endocarditis     6/2021: 4x5mm vegetation on the ventricular surface of the anterior MV, negative blood cultures    Porcelain gallbladder     PUD (peptic ulcer disease)     TIA (transient ischemic attack)     Type 2 diabetes mellitus     Type 2 diabetes mellitus with circulatory disorder, without long-term current use of insulin 07/14/2022    Upper GI bleed      Past Surgical History:   Procedure Laterality Date    BREAST LUMPECTOMY  1999    BRONCHOSCOPY Bilateral 11/8/2023    Procedure: BRONCHOSCOPY UNDER FLUORO WITH BAL,  BIOPSIES; ACETYLCYSTEIN 4ML GIVEN;  Surgeon: Raoul Alford MD;  Location: [facility] ENDOSCOPY;  Service: Pulmonary;  Laterality: Bilateral;  PRE- PULMONARY INFILTRATES  POST- SAME    CARDIAC CATHETERIZATION N/A 09/02/2022    Procedure: Coronary angiography;  Surgeon: Guero Verde MD;  Location: [facility] CATH INVASIVE LOCATION;  Service: Cardiovascular;  Laterality: N/A;    CARDIAC CATHETERIZATION N/A 09/02/2022    Procedure: Stent LEFTY coronary;  Surgeon: Guero Verde MD;  Location: [facility] CATH INVASIVE LOCATION;  Service: Cardiovascular;  Laterality: N/A;    CATARACT EXTRACTION  2011    CHOLECYSTECTOMY      CHOLECYSTECTOMY WITH INTRAOPERATIVE CHOLANGIOGRAM N/A 11/28/2022    Procedure: CHOLECYSTECTOMY LAPAROSCOPIC INTRAOPERATIVE CHOLANGIOGRAM;  Surgeon: Dani Grover Jr., MD;  Location: [facility] MAIN OR;  Service: General;  Laterality: N/A;    COLONOSCOPY N/A 02/09/2023    Procedure: COLONOSCOPY TO CECUM & T.I.;  Surgeon: Guero Ferris MD;  Location: [facility] ENDOSCOPY;  Service: Gastroenterology;  Laterality: N/A;  PRE- MELENA, GI BLEED  POST- DIVERTICULOSIS, INT HEMORRHOIDS    ENDOSCOPY N/A 01/25/2023    Procedure: ESOPHAGOGASTRODUODENOSCOPY WITH BIOPSIES;  Surgeon: Charles Diaz MD;  Location: [facility] ENDOSCOPY;  Service: Gastroenterology;   Laterality: N/A;  pre: abd pain, nausea  post: hiatal hernia, mild gastritis, sloughing of the esophagus    ENTEROSCOPY SMALL BOWEL N/A 02/09/2023    Procedure: ENTEROSCOPY SMALL BOWEL;  Surgeon: Guero Ferris MD;  Location: [facility] ENDOSCOPY;  Service: Gastroenterology;  Laterality: N/A;  PRE- MELENA, GI BLEED  POST- HIATAL HERNIA    JOINT REPLACEMENT      UPPER GASTROINTESTINAL ENDOSCOPY        General Information       Row Name 11/20/23 1412          Physical Therapy Time and Intention    Document Type therapy note (daily note)  -     Mode of Treatment physical therapy  -       Row Name 11/20/23 1412          General Information    Existing Precautions/Restrictions fall;oxygen therapy device and L/min  -       Row Name 11/20/23 1412          Cognition    Orientation Status (Cognition) oriented x 4  -               User Key  (r) = Recorded By, (t) = Taken By, (c) = Cosigned By      Initials Name Provider Type     Sarah Su PTA Physical Therapist Assistant                   Mobility       Row Name 11/20/23 1413          Bed Mobility    Bed Mobility supine-sit;sit-supine  -     Supine-Sit [place] (Bed Mobility) supervision  -     Sit-Supine [place] (Bed Mobility) supervision  -     Assistive Device (Bed Mobility) bed rails;head of bed elevated  -       Row Name 11/20/23 1413          Sit-Stand Transfer    Sit-Stand [place] (Transfers) standby assist  -     Assistive Device (Sit-Stand Transfers) walker, front-wheeled  -       Row Name 11/20/23 1413          Gait/Stairs (Locomotion)    [place] Level (Gait) standby assist;contact guard  -     Distance in Feet (Gait) 40  -     Deviations/Abnormal Patterns (Gait) jeanna decreased;stride length decreased  -     Bilateral Gait Deviations forward flexed posture  -     Comment, (Gait/Stairs) cues to correct posture  -SM               User Key  (r) = Recorded By, (t) = Taken By, (c) = Cosigned By      Initials  Name Provider Type    Sarah Donovan PTA Physical Therapist Assistant                   Obj/Interventions    No documentation.                  Goals/Plan    No documentation.                  Clinical Impression       Row Name 11/20/23 1417          Pain    Pretreatment Pain Rating 0/10 - no pain  -     Posttreatment Pain Rating 0/10 - no pain  -       Row Name 11/20/23 1417          Positioning and Restraints    Pre-Treatment Position in bed  -     Post Treatment Position bed  -SM     In Bed supine;call light within reach;encouraged to call for assist;exit alarm on  -               User Key  (r) = Recorded By, (t) = Taken By, (c) = Cosigned By      Initials Name Provider Type    Sarah Donovan PTA Physical Therapist Assistant                   Outcome Measures       Row Name 11/20/23 1418 11/20/23 0900       How much help from another person do you currently need...    Turning from your back to your side while in flat bed without using bedrails? 3  -SM 4  -LW    Moving from lying on back to sitting on the side of a flat bed without bedrails? 3  -SM 3  -LW    Moving to and from a bed to a chair (including a wheelchair)? 3  -SM 3  -LW    Standing up from a chair using your arms (e.g., wheelchair, bedside chair)? 3  -SM 3  -LW    Climbing 3-5 steps with a railing? 2  -SM 2  -LW    To walk in hospital room? 3  -SM 3  -LW    AM-PAC 6 Clicks Score (PT) 17  -SM 18  -LW    Highest Level of Mobility Goal 5 --> Static standing  - 6 --> Walk 10 steps or more  -      Row Name 11/20/23 1418 11/20/23 1356       Functional Assessment    Outcome Measure Options AM-PAC 6 Clicks Basic Mobility (PT)  - AM-PAC 6 Clicks Daily Activity (OT)  -              User Key  (r) = Recorded By, (t) = Taken By, (c) = Cosigned By      Initials Name Provider Type    Sarah Donovan PTA Physical Therapist Assistant    Annika May, RN Registered Nurse    Bri Soler, OT Occupational Therapist                                  Physical Therapy Education       Title: PT OT SLP Therapies (In Progress)       Topic: Physical Therapy (Done)       Point: Mobility training (Done)       Learning Progress Summary             Patient Acceptance, E,TB,D, VU,NR by  at 11/20/2023 1418    Acceptance, E, NR by  at 11/17/2023 1500    Acceptance, E,TB,D, VU,NR by  at 11/16/2023 1522    Acceptance, E,TB,D, VU,NR by  at 11/15/2023 1648    Acceptance, E,TB,D, VU,NR by  at 11/10/2023 1511    Acceptance, E, VU by  at 11/10/2023 0407    Acceptance, E, VU by ML at 11/9/2023 0432    Acceptance, E, VU by ML at 11/8/2023 0449    Acceptance, E, VU by ML at 11/7/2023 0517    Acceptance, E, VU by ML at 11/6/2023 0459    Acceptance, E, VU by SK at 11/3/2023 1142   Family Acceptance, E, VU by SK at 11/3/2023 1142                         Point: Home exercise program (Done)       Learning Progress Summary             Patient Acceptance, E,TB,D, VU,NR by  at 11/20/2023 1418    Acceptance, E, NR by  at 11/17/2023 1500    Acceptance, E,TB,D, VU,NR by  at 11/16/2023 1522    Acceptance, E,TB,D, VU,NR by  at 11/15/2023 1648    Acceptance, E,TB,D, VU,NR by  at 11/14/2023 1435    Acceptance, E,TB,D, VU,NR by  at 11/10/2023 1511    Acceptance, E, VU by  at 11/10/2023 0407    Acceptance, E, VU by ML at 11/9/2023 0432    Acceptance, E, VU by ML at 11/8/2023 0449    Acceptance, E, VU by ML at 11/7/2023 0517    Acceptance, E, VU by ML at 11/6/2023 0459                         Point: Body mechanics (Done)       Learning Progress Summary             Patient Acceptance, E,TB,D, VU,NR by  at 11/20/2023 1418    Acceptance, E, NR by  at 11/17/2023 1500    Acceptance, E,TB,D, VU,NR by SM at 11/16/2023 1522    Acceptance, E,TB,D, VU,NR by SM at 11/15/2023 1648    Acceptance, E,TB,D, VU,NR by SM at 11/10/2023 1511    Acceptance, E, VU by ML at 11/10/2023 0407    Acceptance, E, VU by ML at 11/9/2023 0432    Acceptance, E, VU by ML at  11/8/2023 0449    Acceptance, E, VU by ML at 11/7/2023 0517    Acceptance, E, VU by ML at 11/6/2023 0459    Acceptance, E, VU by SK at 11/3/2023 1142   Family Acceptance, E, VU by SK at 11/3/2023 1142                         Point: Precautions (Done)       Learning Progress Summary             Patient Acceptance, E,TB,D, VU,NR by  at 11/20/2023 1418    Acceptance, E, NR by  at 11/17/2023 1500    Acceptance, E,TB,D, VU,NR by  at 11/16/2023 1522    Acceptance, E,TB,D, VU,NR by  at 11/15/2023 1648    Acceptance, E,TB,D, VU,NR by  at 11/10/2023 1511    Acceptance, E, VU by  at 11/10/2023 0407    Acceptance, E, VU by  at 11/9/2023 0432    Acceptance, E, VU by  at 11/8/2023 0449    Acceptance, E, VU by  at 11/7/2023 0517    Acceptance, E, VU by ML at 11/6/2023 0459    Acceptance, E, VU by SK at 11/3/2023 1142   Family Acceptance, E, VU by SK at 11/3/2023 1142                                         User Key       Initials Effective Dates Name Provider Type Discipline     07/11/23 -  Clare Rodriguez, PT Physical Therapist PT     03/07/18 -  Sarah Su, CHERYL Physical Therapist Assistant PT     06/15/23 -  Isatu Johnson, RN Registered Nurse Nurse    SK 10/10/23 -  Nessa Barajas, OZIEL Student PT Student PT                  PT Recommendation and Plan     Plan of Care Reviewed With: patient  Progress: improving  Outcome Evaluation: Pt tolerated treatment well this date. Pt stated she has been up to ambulate w/ the doctor today, and has also completed a few of her exercises on her own. Required SBA for bed mobility and to stand, then CGA to ambulate 40ft w/ Rw. Encouraged pt to ambulate w/ nsg during the day and to continue her exercises.     Time Calculation:         PT Charges       Row Name 11/20/23 1423             Time Calculation    Start Time 1320  -      Stop Time 1343  -      Time Calculation (min) 23 min  -      PT Received On 11/20/23  -      PT - Next Appointment  11/21/23  -                User Key  (r) = Recorded By, (t) = Taken By, (c) = Cosigned By      Initials Name Provider Type     Sarah Su PTA Physical Therapist Assistant                  Therapy Charges for Today       Code Description Service Date Service Provider Modifiers Qty    30451673956 HC GAIT TRAINING EA 15 MIN 11/20/2023 Sarah Su, CHERYL GP 1    56138732730 HC PT THER PROC EA 15 MIN 11/20/2023 Sarah Su PTA GP 1            PT G-Codes  Outcome Measure Options: AM-PAC 6 Clicks Basic Mobility (PT)  AM-PAC 6 Clicks Score (PT): 17  AM-PAC 6 Clicks Score (OT): 16  PT Discharge Summary  Anticipated Discharge Disposition (PT): skilled nursing facility    Sarah Su PTA  11/20/2023

## 2023-11-20 NOTE — THERAPY TREATMENT NOTE
Patient Name: Catherine Boyer  : 1940    MRN: [id number]                              Today's Date: 2023       Admit Date: 2023    Visit Dx:     ICD-10-CM ICD-9-CM   1. Acute renal failure, unspecified acute renal failure type  N17.9 584.9   2. Acute abdominal pain  R10.9 789.00     338.19   3. Nausea vomiting and diarrhea  R11.2 787.91    R19.7 787.01   4. Pulmonary infiltrate  R91.8 793.19     Patient Active Problem List   Diagnosis    Acute on chronic diastolic heart failure    CAD (coronary artery disease)    Nonbacterial thrombotic endocarditis    Paroxysmal atrial fibrillation    Myeloproliferative disorder    Microcytic anemia    Type 2 diabetes mellitus with circulatory disorder, without long-term current use of insulin    GERD (gastroesophageal reflux disease)    Hypertension    Personal history of transient ischemic attack (TIA), 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 deficits    Porcelain gallbladder    Breast cancer    Atherosclerosis of abdominal aorta    APC (atrial premature contractions)    Moderate malnutrition    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    Elevated troponin    TIA (transient ischemic attack)    PUD (peptic ulcer disease)    Anemia    Tachycardia    Myelofibrosis    Normocytic anemia    Acute kidney injury    Pulmonary infiltrate    Iatrogenic pneumothorax     Past Medical History:   Diagnosis Date    Atherosclerosis of abdominal aorta 2023    Atrial fibrillation     Breast cancer     CAD (coronary artery disease)     NSTEMI 2022: 90% ostial LAD, 99% D1, 70% mid-distal LAD (medical therapy). She received two stents (2.5x18 and 2.5x26mm Finesse LEFTY) but I don't know which one went to which lesion.    Carotid atherosclerosis     Cholecystitis 2022    Added automatically from request for surgery [id number]    Chronic diastolic (congestive) heart failure     COVID 10/29/2022    GERD (gastroesophageal reflux disease)     Glaucoma     History of cataract     Hypertension      Microcytic anemia     per Dr. oleg pate office  note 6/30/22-dd    Myeloproliferative disorder     JAK2 positive    Nonbacterial thrombotic endocarditis     6/2021: 4x5mm vegetation on the ventricular surface of the anterior MV, negative blood cultures    Porcelain gallbladder     PUD (peptic ulcer disease)     TIA (transient ischemic attack)     Type 2 diabetes mellitus     Type 2 diabetes mellitus with circulatory disorder, without long-term current use of insulin 07/14/2022    Upper GI bleed      Past Surgical History:   Procedure Laterality Date    BREAST LUMPECTOMY  1999    BRONCHOSCOPY Bilateral 11/8/2023    Procedure: BRONCHOSCOPY UNDER FLUORO WITH BAL,  BIOPSIES; ACETYLCYSTEIN 4ML GIVEN;  Surgeon: Raoul Alford MD;  Location: [facility] ENDOSCOPY;  Service: Pulmonary;  Laterality: Bilateral;  PRE- PULMONARY INFILTRATES  POST- SAME    CARDIAC CATHETERIZATION N/A 09/02/2022    Procedure: Coronary angiography;  Surgeon: Guero Verde MD;  Location: [facility] CATH INVASIVE LOCATION;  Service: Cardiovascular;  Laterality: N/A;    CARDIAC CATHETERIZATION N/A 09/02/2022    Procedure: Stent LEFTY coronary;  Surgeon: Guero Verde MD;  Location: [facility] CATH INVASIVE LOCATION;  Service: Cardiovascular;  Laterality: N/A;    CATARACT EXTRACTION  2011    CHOLECYSTECTOMY      CHOLECYSTECTOMY WITH INTRAOPERATIVE CHOLANGIOGRAM N/A 11/28/2022    Procedure: CHOLECYSTECTOMY LAPAROSCOPIC INTRAOPERATIVE CHOLANGIOGRAM;  Surgeon: Dani Grover Jr., MD;  Location: [facility] MAIN OR;  Service: General;  Laterality: N/A;    COLONOSCOPY N/A 02/09/2023    Procedure: COLONOSCOPY TO CECUM & T.I.;  Surgeon: Guero Ferris MD;  Location: [facility] ENDOSCOPY;  Service: Gastroenterology;  Laterality: N/A;  PRE- MELENA, GI BLEED  POST- DIVERTICULOSIS, INT HEMORRHOIDS    ENDOSCOPY N/A 01/25/2023    Procedure: ESOPHAGOGASTRODUODENOSCOPY WITH BIOPSIES;  Surgeon: Charles Diaz MD;  Location: [facility] ENDOSCOPY;  Service: Gastroenterology;   Laterality: N/A;  pre: abd pain, nausea  post: hiatal hernia, mild gastritis, sloughing of the esophagus    ENTEROSCOPY SMALL BOWEL N/A 02/09/2023    Procedure: ENTEROSCOPY SMALL BOWEL;  Surgeon: Guero Ferris MD;  Location: [facility] ENDOSCOPY;  Service: Gastroenterology;  Laterality: N/A;  PRE- MELENA, GI BLEED  POST- HIATAL HERNIA    JOINT REPLACEMENT      UPPER GASTROINTESTINAL ENDOSCOPY        General Information       Row Name 11/20/23 1349          OT Time and Intention    Document Type therapy note (daily note)  -     Mode of Treatment individual therapy;occupational therapy  -       Row Name 11/20/23 1349          General Information    Patient Profile Reviewed yes  -     Prior Level of Function independent:  -     Existing Precautions/Restrictions fall;oxygen therapy device and L/min  -     Barriers to Rehab medically complex  -       Row Name 11/20/23 1349          Living Environment    People in Home facility resident  -       Row Name 11/20/23 1349          Home Main Entrance    Number of Stairs, Main Entrance none  -       Row Name 11/20/23 1349          Cognition    Orientation Status (Cognition) oriented x 4  -       Row Name 11/20/23 1349          Safety Issues, Functional Mobility    Impairments Affecting Function (Mobility) balance;endurance/activity tolerance;strength  -               User Key  (r) = Recorded By, (t) = Taken By, (c) = Cosigned By      Initials Name Provider Type     Bri Dyson OT Occupational Therapist                     Mobility/ADL's       Row Name 11/20/23 1350          Bed Mobility    Bed Mobility supine-sit  -     All Activities, Millard (Bed Mobility) minimum assist (75% patient effort)  -               User Key  (r) = Recorded By, (t) = Taken By, (c) = Cosigned By      Initials Name Provider Type     Bri Dyson OT Occupational Therapist                   Obj/Interventions       Row Name 11/20/23 1357          Sensory Assessment  (Somatosensory)    Sensory Assessment (Somatosensory) sensation intact  -       Row Name 11/20/23 1350          Vision Assessment/Intervention    Visual Impairment/Limitations WNL  -[facility] Name 11/20/23 1350          Range of Motion Comprehensive    General Range of Motion bilateral upper extremity ROM WNL  -[facility] Name 11/20/23 1350          Strength Comprehensive (MMT)    Comment, General Manual Muscle Testing (MMT) Assessment BUE MMT 3+/5  -       Row Name 11/20/23 1350          Shoulder (Therapeutic Exercise)    Shoulder (Therapeutic Exercise) AROM (active range of motion)  -     Shoulder AROM (Therapeutic Exercise) bilateral;flexion;extension;aBduction;aDduction;horizontal aBduction/aDduction;15 repititions  -[facility] Name 11/20/23 1350          Elbow/Forearm (Therapeutic Exercise)    Elbow/Forearm (Therapeutic Exercise) AROM (active range of motion)  -     Elbow/Forearm AROM (Therapeutic Exercise) bilateral;flexion;pronation;supination;extension;15 repititions  -[facility] Name 11/20/23 1350          Wrist (Therapeutic Exercise)    Wrist (Therapeutic Exercise) AROM (active range of motion)  -     Wrist AROM (Therapeutic Exercise) bilateral;flexion;extension;ulnar deviation;radial deviation;15 repititions  -[facility] Name 11/20/23 1350          Motor Skills    Motor Skills functional endurance  -     Functional Endurance Fair  -     Therapeutic Exercise shoulder;elbow/forearm;wrist  -[facility] Name 11/20/23 1350          Balance    Balance Assessment sitting static balance  -     Static Sitting Balance modified independence  -     Position, Sitting Balance supported  -     Static Standing Balance standby assist  -     Dynamic Standing Balance contact guard  -     Position/Device Used, Standing Balance supported;walker, front-wheeled  -     Balance Interventions sitting;occupation based/functional task;UE activity with balance activity  -               User Key   (r) = Recorded By, (t) = Taken By, (c) = Cosigned By      Initials Name Provider Type     Bri Dyson OT Occupational Therapist                   Goals/Plan    No documentation.                  Clinical Impression       Row Name 11/20/23 1352          Pain Assessment    Pretreatment Pain Rating 0/10 - no pain  -     Posttreatment Pain Rating 0/10 - no pain  -     Pain Intervention(s) Ambulation/increased activity;Repositioned  -LH       Row Name 11/20/23 1352          Plan of Care Review    Plan of Care Reviewed With patient  -     Outcome Evaluation Patient tolerated treatment well, performed UB exercises while in bed, patient requests not to get up due to already having been up with physical therapy. Patient performed exercises to address shoulders, back, chest, core, and arms. Requires intermittent rest breaks due to fatigue. Patient will continue to benefit from skilled OT to address remaining functional deficits, recommend SNF at [place].  -[facility] Name 11/20/23 1352          Therapy Assessment/Plan (OT)    Rehab Potential (OT) fair, will monitor progress closely  -     Criteria for Skilled Therapeutic Interventions Met (OT) yes;skilled treatment is necessary  -     Therapy Frequency (OT) 3 times/wk  -[facility] Name 11/20/23 1352          Therapy Plan Review/Discharge Plan (OT)    Anticipated Discharge Disposition (OT) skilled nursing facility  -LH       Row Name 11/20/23 1354          Positioning and Restraints    Pre-Treatment Position in bed  -     Post Treatment Position bed  -     In Bed fowlers;call light within reach;encouraged to call for assist;exit alarm on  [facility]               User Key  (r) = Recorded By, (t) = Taken By, (c) = Cosigned By      Initials Name Provider Type     Bri Dyson OT Occupational Therapist                   Outcome Measures       Row Name 11/20/23 8384          How much help from another is currently needed...    Putting on and taking off regular lower  body clothing? 2  -LH     Bathing (including washing, rinsing, and drying) 2  -LH     Toileting (which includes using toilet bed pan or urinal) 2  -LH     Putting on and taking off regular upper body clothing 3  -LH     Taking care of personal grooming (such as brushing teeth) 3  -LH     Eating meals 4  -LH     AM-PAC 6 Clicks Score (OT) 16  -       Row Name 11/20/23 0900          How much help from another person do you currently need...    Turning from your back to your side while in flat bed without using bedrails? 4  -LW     Moving from lying on back to sitting on the side of a flat bed without bedrails? 3  -LW     Moving to and from a bed to a chair (including a wheelchair)? 3  -LW     Standing up from a chair using your arms (e.g., wheelchair, bedside chair)? 3  -LW     Climbing 3-5 steps with a railing? 2  -LW     To walk in hospital room? 3  -LW     AM-PAC 6 Clicks Score (PT) 18  -LW     Highest Level of Mobility Goal 6 --> Walk 10 steps or more  -       Row Name 11/20/23 1356          Functional Assessment    Outcome Measure Options AM-PAC 6 Clicks Daily Activity (OT)  -               User Key  (r) = Recorded By, (t) = Taken By, (c) = Cosigned By      Initials Name Provider Type    Annika May, RN Registered Nurse     Bri Dyson OT Occupational Therapist                    Occupational Therapy Education       Title: PT OT SLP Therapies (In Progress)       Topic: Occupational Therapy (In Progress)       Point: ADL training (In Progress)       Description:   Instruct learner(s) on proper safety adaptation and remediation techniques during self care or transfers.   Instruct in proper use of assistive devices.                  Learning Progress Summary             Patient Acceptance, E, NR by SV at 11/17/2023 1500    Acceptance, E, VU by ML at 11/10/2023 0407    Acceptance, E, VU by ML at 11/9/2023 0432    Acceptance, E, VU by ML at 11/8/2023 0449    Acceptance, E, VU by ML at 11/7/2023 0517     Acceptance, E, VU by ML at 11/6/2023 0459    Acceptance, E, VU by JW at 11/3/2023 1512    Comment: role of OT, plan of care, safety                         Point: Home exercise program (In Progress)       Description:   Instruct learner(s) on appropriate technique for monitoring, assisting and/or progressing therapeutic exercises/activities.                  Learning Progress Summary             Patient Acceptance, E, NR by SV at 11/17/2023 1500    Acceptance, E, VU by ML at 11/10/2023 0407    Acceptance, E, VU by ML at 11/9/2023 0432    Acceptance, E, VU by ML at 11/8/2023 0449    Acceptance, E, VU by ML at 11/7/2023 0517    Acceptance, E, VU by ML at 11/6/2023 0459                         Point: Precautions (In Progress)       Description:   Instruct learner(s) on prescribed precautions during self-care and functional transfers.                  Learning Progress Summary             Patient Acceptance, E, NR by SV at 11/17/2023 1500    Acceptance, E, VU by ML at 11/10/2023 0407    Acceptance, E, VU by ML at 11/9/2023 0432    Acceptance, E, VU by ML at 11/8/2023 0449    Acceptance, E, VU by ML at 11/7/2023 0517    Acceptance, E, VU by ML at 11/6/2023 0459    Acceptance, E, VU by JW at 11/3/2023 1512    Comment: role of OT, plan of care, safety                         Point: Body mechanics (In Progress)       Description:   Instruct learner(s) on proper positioning and spine alignment during self-care, functional mobility activities and/or exercises.                  Learning Progress Summary             Patient Acceptance, E, NR by SV at 11/17/2023 1500    Acceptance, E, VU by ML at 11/10/2023 0407    Acceptance, E, VU by ML at 11/9/2023 0432    Acceptance, E, VU by ML at 11/8/2023 0449    Acceptance, E, VU by ML at 11/7/2023 0517    Acceptance, E, VU by ML at 11/6/2023 0459    Acceptance, E, VU by JW at 11/3/2023 1512    Comment: role of OT, plan of care, safety                                         User Key        Initials Effective Dates Name Provider Type Discipline    SV 07/11/23 -  Clare Rodriguez PT Physical Therapist PT    JW 06/10/21 -  Esperanza Lozada OT Occupational Therapist OT    ML 06/15/23 -  Isatu Johnson, RN Registered Nurse Nurse                  OT Recommendation and Plan  Therapy Frequency (OT): 3 times/wk  Plan of Care Review  Plan of Care Reviewed With: patient  Progress: improving  Outcome Evaluation: Patient tolerated treatment well, performed UB exercises while in bed, patient requests not to get up due to already having been up with physical therapy. Patient performed exercises to address shoulders, back, chest, core, and arms. Requires intermittent rest breaks due to fatigue. Patient will continue to benefit from skilled OT to address remaining functional deficits, recommend SNF at [place].     Time Calculation:         Time Calculation- OT       Row Name 11/20/23 1357             Time Calculation- OT    OT Start Time 1345  -      OT Stop Time 1400  -      OT Time Calculation (min) 15 min  -      Total Timed Code Minutes- OT 15 minute(s)  -      OT Received On 11/20/23  -      OT - Next Appointment 11/22/23  -         Timed Charges    67783 - OT Therapeutic Exercise Minutes 15  -         Total Minutes    Timed Charges Total Minutes 15  -       Total Minutes 15  -                User Key  (r) = Recorded By, (t) = Taken By, (c) = Cosigned By      Initials Name Provider Type     Bri Dyson OT Occupational Therapist                  Therapy Charges for Today       Code Description Service Date Service Provider Modifiers Qty    28142921500 HC OT THER PROC EA 15 MIN 11/20/2023 Bri Dyson OT GO 1                 Bri Dyson OT  11/20/2023

## 2023-11-20 NOTE — PROGRESS NOTES
[facility]     LOS: 18 days     Subjective:    CC:Abdominal Pain, Nausea, and Vomiting    DIET:  Diet Order   Procedures    Diet: Cardiac Diets, Diabetic Diets; Healthy Heart (2-3 Na+); Consistent Carbohydrate; Texture: Regular Texture (IDDSI 7); Fluid Consistency: Thin (IDDSI 0)     No new complaints    Objective:    Vital Signs:  Temp:  [98.1 °F (36.7 °C)-98.8 °F (37.1 °C)] 98.8 °F (37.1 °C)  Heart Rate:  [85-99] 90  Resp:  [18] 18  BP: (111-127)/(63-68) 127/68    SpO2:  [94 %-98 %] 95 %  on  Flow (L/min):  [3-3.5] 3;   Device (Oxygen Therapy): nasal cannula  Body mass index is 23.59 kg/m².    Physical Exam  Constitutional:       Appearance: Normal appearance.   HENT:      Head: Normocephalic and atraumatic.   Cardiovascular:      Rate and Rhythm: Normal rate and regular rhythm.      Heart sounds: No murmur heard.     No friction rub.   Pulmonary:      Effort: Pulmonary effort is normal.      Breath sounds: Wheezing present.   Abdominal:      General: Bowel sounds are normal. There is no distension.      Palpations: Abdomen is soft.      Tenderness: There is no abdominal tenderness.   Skin:     General: Skin is warm and dry.   Neurological:      General: No focal deficit present.      Mental Status: She is alert.         Results Review:    Glucose   Date Value Ref Range Status   11/20/2023 123 (H) 65 - 99 mg/dL Final   11/19/2023 123 (H) 65 - 99 mg/dL Final   11/18/2023 152 (H) 65 - 99 mg/dL Final     Results from last 7 days   Lab Units 11/20/23  0603   WBC 10*3/mm3 60.39*   HEMOGLOBIN g/dL 9.4*   HEMATOCRIT % 29.7*   PLATELETS 10*3/mm3 241     Results from last 7 days   Lab Units 11/20/23  0603   SODIUM mmol/L 136   POTASSIUM mmol/L 3.9   CHLORIDE mmol/L 93*   CO2 mmol/L 29.9*   BUN mg/dL 18   CREATININE mg/dL 0.86   CALCIUM mg/dL 9.4   GLUCOSE mg/dL 123*         Results from last 7 days   Lab Units 11/15/23  2252   MAGNESIUM mg/dL 2.1     Results from last 7 days   Lab Units  "11/15/23  2349 11/15/23  2252   HSTROP T ng/L 33* 33*     Cultures:  No results found for: \"BLOODCX\", \"URINECX\", \"WOUNDCX\", \"MRSACX\", \"RESPCX\", \"STOOLCX\"    I have reviewed daily medications and changes in CPOE    Scheduled meds  apixaban, 5 mg, Oral, Q12H  aspirin, 81 mg, Oral, Daily  budesonide-formoterol, 2 puff, Inhalation, BID - RT  famotidine, 20 mg, Oral, Daily  furosemide, 10 mg, Oral, Daily  latanoprost, 1 drop, Both Eyes, Nightly  lidocaine, 20 mL, Infiltration, Once  metoprolol succinate XL, 12.5 mg, Oral, Nightly  metoprolol succinate XL, 25 mg, Oral, Daily  Momelotinib Dihydrochloride, 200 mg, Oral, Daily  predniSONE, 40 mg, Oral, Daily With Breakfast   Followed by  [START ON 11/23/2023] predniSONE, 30 mg, Oral, Daily With Breakfast   Followed by  [START ON 11/26/2023] predniSONE, 20 mg, Oral, Daily With Breakfast   Followed by  [START ON 11/29/2023] predniSONE, 10 mg, Oral, Daily With Breakfast  saccharomyces boulardii, 250 mg, Oral, BID  senna-docusate sodium, 2 tablet, Oral, BID  sertraline, 100 mg, Oral, Daily  tiotropium bromide monohydrate, 2 puff, Inhalation, Daily - RT           PRN meds    acetaminophen    albuterol    senna-docusate sodium **AND** polyethylene glycol **AND** bisacodyl **AND** bisacodyl    HYDROcodone Bit-Homatrop MBr    LORazepam    melatonin    nitroglycerin    ondansetron **OR** ondansetron    Phosphorus Replacement - Follow Nurse / BPA Driven Protocol    Potassium Replacement - Follow Nurse / BPA Driven Protocol    simethicone    Insert Peripheral IV **AND** sodium chloride        Acute kidney injury    CAD (coronary artery disease)    Paroxysmal atrial fibrillation    Myeloproliferative disorder    Type 2 diabetes mellitus with circulatory disorder, without long-term current use of insulin    Hypertension    Personal history of transient ischemic attack (TIA), 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 deficits    Myelofibrosis    Pulmonary infiltrate    Iatrogenic " pneumothorax        Assessment/Plan:      83 y.o. female admitted with Acute kidney injury.     Acute Cystitis without Hematuria  - Urine culture grew ESBL.  - Completed ertapenem     KARLI  - Resolved with IVF  - Appreciate nephrology recs- lasix restarted     Lung mass  Bilateral pulmonary infiltrates  - s/p bronchoscopy 11/8/23 with biopsies, pathology showing no evidence of malignancy but only 3 biopsies taken  - has small iatrogenic pneumothorax, improved with conservative treatment  - follow up chest CT scan without contrast in 6-8 weeks  - appreciate pulmonology recs, they have signed off-follow up with [facility] for Dr. Billings, in 8 weeks  - transition patient from IV steroids to oral steroid taper     Aortic Stenosis/Chronic Diastolic CHF  Coronary artery disease  Paroxysmal atrial fibrillation  Hypertension  - Continue Eliquis, lasix, metoprolol  - no plavix at DC- cards rec resuming ASA 81mg- started 11/15   - troponin is stable/consistent with previous troponin, EKG with sinus tach, no ischemic changes noted.     Myeloproliferative disorder/Anemia  Functional iron deficiency  Leukocytosis  - Has chronic leukocytosis, values elevated on most recent labs, however, relatively stable from prior.  - s/p 1 unit of PRBCs 11/6/23 and 11/7/23 with good response; also received ferrelicit  - oncology evaluated, o/p f/u planned- signed off  - daily cbc     Anxiety  -Start low-dose Ativan while inpatient 0.25mg q6h prn     HR high at times, stop duonebs, start symbicort and spiriva  Cardiology added small dose of metoprolol, HR is better    Awaiting precert    Eduard Rees MD  11/20/23  15:22 EST

## 2023-11-20 NOTE — PLAN OF CARE
Goal Outcome Evaluation:  Plan of Care Reviewed With: patient           Outcome Evaluation: VSS, no comlaints of pain, out of chemo meds, daughter aware, bed alarm on, call light in reach, precert pending

## 2023-11-20 NOTE — PROGRESS NOTES
[facility]     Progress Note    Patient Name: Catherine Boyer  : 1940  MRN: [id number]  Primary Care Physician:  Kristi Moreau APRN  Date of admission: 2023    Subjective   Subjective     Chief Complaint: adelfo    History of Present Illness  Patient follow up adelfo    Review of Systems    Objective   Objective     Vitals:   Temp:  [97.3 °F (36.3 °C)-98.8 °F (37.1 °C)] 98.1 °F (36.7 °C)  Heart Rate:  [] 85  Resp:  [16-18] 18  BP: (111-119)/(63-95) 118/63  Flow (L/min):  [3-3.5] 3    Physical Exam     Result Review    Result Review:  I have personally reviewed the results from the time of this admission to 2023 08:17 EST and agree with these findings:  []  Laboratory list / accordion  []  Microbiology  []  Radiology  []  EKG/Telemetry   []  Cardiology/Vascular   []  Pathology  []  Old records  []  Other:  Most notable findings include:       Assessment & Plan   Assessment / Plan     Brief Patient Summary:  Catherine Boyer is a 83 y.o. female who has adelfo    Active Hospital Problems:  Active Hospital Problems    Diagnosis     **Acute kidney injury     Iatrogenic pneumothorax     Pulmonary infiltrate     Myelofibrosis     Personal history of transient ischemic attack (TIA), 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 deficits     Hypertension     Type 2 diabetes mellitus with circulatory disorder, without long-term current use of insulin     Paroxysmal atrial fibrillation     CAD (coronary artery disease)     Myeloproliferative disorder      Plan:   Patient chart and labs reviewed. Renal function and electrolytes stable. Adelfo resolved. Will follow as needed    Nimisha Weiss MD

## 2023-11-20 NOTE — PLAN OF CARE
Goal Outcome Evaluation:  Plan of Care Reviewed With: patient        Progress: improving  Outcome Evaluation: VSS. No c/o pain. A&Ox4. worked with PT/OT today. Pt had bm this shift. Purwick in place. CXR completed this shift. Awaiting rehab placement to move forward with dc. No acute events this shift.

## 2023-11-20 NOTE — PLAN OF CARE
Goal Outcome Evaluation:  Plan of Care Reviewed With: patient        Progress: improving  Outcome Evaluation: Pt tolerated treatment well this date. Pt stated she has been up to ambulate w/ the doctor today, and has also completed a few of her exercises on her own. Required SBA for bed mobility and to stand, then CGA to ambulate 40ft w/ Rw. Encouraged pt to ambulate w/ nsg during the day and to continue her exercises.      Anticipated Discharge Disposition (PT): skilled nursing facility

## 2023-11-20 NOTE — CASE MANAGEMENT/SOCIAL WORK
Continued Stay Note  [facility]     Patient Name: Catherine Boyer  MRN: [id number]  Today's Date: 11/20/2023    Admit Date: 11/2/2023    Plan: [facility] PENDING precert.   Discharge Plan       Row Name 11/20/23 0913       Plan    Plan [facility] PENDING precert.    Plan Comments S/w Suzie/ [facility] who states the [employer] precert is still pending at this time.  She will notify CCP when they receive a determination ..........Cherry CONTRERAS/ CCP                   Discharge Codes    No documentation.                 Expected Discharge Date and Time       Expected Discharge Date Expected Discharge Time    Nov 20, 2023               Cherry Bowen RN

## 2023-11-21 LAB
ALBUMIN SERPL-MCNC: 3.9 G/DL (ref 3.5–5.2)
ANION GAP SERPL CALCULATED.3IONS-SCNC: 15 MMOL/L (ref 5–15)
BASOPHILS # BLD MANUAL: 0.56 10*3/MM3 (ref 0–0.2)
BASOPHILS NFR BLD MANUAL: 1 % (ref 0–1.5)
BUN SERPL-MCNC: 25 MG/DL (ref 8–23)
BUN/CREAT SERPL: 25.3 (ref 7–25)
CALCIUM SPEC-SCNC: 9.4 MG/DL (ref 8.6–10.5)
CHLORIDE SERPL-SCNC: 93 MMOL/L (ref 98–107)
CO2 SERPL-SCNC: 27 MMOL/L (ref 22–29)
CREAT SERPL-MCNC: 0.99 MG/DL (ref 0.57–1)
DEPRECATED RDW RBC AUTO: 64.3 FL (ref 37–54)
EGFRCR SERPLBLD CKD-EPI 2021: 56 ML/MIN/1.73
ERYTHROCYTE [DISTWIDTH] IN BLOOD BY AUTOMATED COUNT: 18.8 % (ref 12.3–15.4)
GLUCOSE SERPL-MCNC: 75 MG/DL (ref 65–99)
HCT VFR BLD AUTO: 30.7 % (ref 34–46.6)
HGB BLD-MCNC: 9.7 G/DL (ref 12–15.9)
LYMPHOCYTES # BLD MANUAL: 2.82 10*3/MM3 (ref 0.7–3.1)
LYMPHOCYTES NFR BLD MANUAL: 10 % (ref 5–12)
MCH RBC QN AUTO: 29.8 PG (ref 26.6–33)
MCHC RBC AUTO-ENTMCNC: 31.6 G/DL (ref 31.5–35.7)
MCV RBC AUTO: 94.2 FL (ref 79–97)
METAMYELOCYTES NFR BLD MANUAL: 2 % (ref 0–0)
MONOCYTES # BLD: 5.65 10*3/MM3 (ref 0.1–0.9)
NEUTROPHILS # BLD AUTO: 46.31 10*3/MM3 (ref 1.7–7)
NEUTROPHILS NFR BLD MANUAL: 82 % (ref 42.7–76)
NRBC BLD AUTO-RTO: 0.2 /100 WBC (ref 0–0.2)
PHOSPHATE SERPL-MCNC: 2.9 MG/DL (ref 2.5–4.5)
PLAT MORPH BLD: NORMAL
PLATELET # BLD AUTO: 223 10*3/MM3 (ref 140–450)
PMV BLD AUTO: 11.7 FL (ref 6–12)
POTASSIUM SERPL-SCNC: 3.8 MMOL/L (ref 3.5–5.2)
RBC # BLD AUTO: 3.26 10*6/MM3 (ref 3.77–5.28)
RBC MORPH BLD: NORMAL
SODIUM SERPL-SCNC: 135 MMOL/L (ref 136–145)
VARIANT LYMPHS NFR BLD MANUAL: 5 % (ref 19.6–45.3)
WBC MORPH BLD: NORMAL
WBC NRBC COR # BLD AUTO: 56.47 10*3/MM3 (ref 3.4–10.8)

## 2023-11-21 PROCEDURE — 85007 BL SMEAR W/DIFF WBC COUNT: CPT | Performed by: INTERNAL MEDICINE

## 2023-11-21 PROCEDURE — 63710000001 PREDNISONE PER 1 MG: Performed by: STUDENT IN AN ORGANIZED HEALTH CARE EDUCATION/TRAINING PROGRAM

## 2023-11-21 PROCEDURE — 97116 GAIT TRAINING THERAPY: CPT

## 2023-11-21 PROCEDURE — 94760 N-INVAS EAR/PLS OXIMETRY 1: CPT

## 2023-11-21 PROCEDURE — 97110 THERAPEUTIC EXERCISES: CPT

## 2023-11-21 PROCEDURE — 94664 DEMO&/EVAL PT USE INHALER: CPT

## 2023-11-21 PROCEDURE — 94761 N-INVAS EAR/PLS OXIMETRY MLT: CPT

## 2023-11-21 PROCEDURE — 94799 UNLISTED PULMONARY SVC/PX: CPT

## 2023-11-21 PROCEDURE — 85025 COMPLETE CBC W/AUTO DIFF WBC: CPT | Performed by: INTERNAL MEDICINE

## 2023-11-21 PROCEDURE — 80069 RENAL FUNCTION PANEL: CPT | Performed by: INTERNAL MEDICINE

## 2023-11-21 RX ORDER — PREDNISONE 20 MG/1
20 TABLET ORAL
Status: DISCONTINUED | OUTPATIENT
Start: 2023-11-22 | End: 2023-11-22 | Stop reason: HOSPADM

## 2023-11-21 RX ORDER — PREDNISONE 20 MG/1
20 TABLET ORAL
Status: DISCONTINUED | OUTPATIENT
Start: 2023-11-22 | End: 2023-11-21

## 2023-11-21 RX ORDER — PREDNISONE 5 MG/1
5 TABLET ORAL
Status: DISCONTINUED | OUTPATIENT
Start: 2023-12-13 | End: 2023-11-22 | Stop reason: HOSPADM

## 2023-11-21 RX ORDER — PREDNISONE 10 MG/1
10 TABLET ORAL
Status: DISCONTINUED | OUTPATIENT
Start: 2023-12-06 | End: 2023-11-22 | Stop reason: HOSPADM

## 2023-11-21 RX ADMIN — METOPROLOL SUCCINATE 25 MG: 25 TABLET, EXTENDED RELEASE ORAL at 08:22

## 2023-11-21 RX ADMIN — FAMOTIDINE 20 MG: 20 TABLET ORAL at 08:22

## 2023-11-21 RX ADMIN — SERTRALINE 100 MG: 100 TABLET, FILM COATED ORAL at 08:20

## 2023-11-21 RX ADMIN — APIXABAN 5 MG: 5 TABLET, FILM COATED ORAL at 08:20

## 2023-11-21 RX ADMIN — TIOTROPIUM BROMIDE INHALATION SPRAY 2 PUFF: 3.12 SPRAY, METERED RESPIRATORY (INHALATION) at 07:59

## 2023-11-21 RX ADMIN — BUDESONIDE AND FORMOTEROL FUMARATE DIHYDRATE 2 PUFF: 160; 4.5 AEROSOL RESPIRATORY (INHALATION) at 20:43

## 2023-11-21 RX ADMIN — LATANOPROST 1 DROP: 50 SOLUTION/ DROPS OPHTHALMIC at 21:39

## 2023-11-21 RX ADMIN — Medication 250 MG: at 21:38

## 2023-11-21 RX ADMIN — METOPROLOL SUCCINATE 12.5 MG: 25 TABLET, EXTENDED RELEASE ORAL at 21:39

## 2023-11-21 RX ADMIN — Medication 10 ML: at 21:39

## 2023-11-21 RX ADMIN — Medication 1 APPLICATION: at 21:40

## 2023-11-21 RX ADMIN — PREDNISONE 40 MG: 20 TABLET ORAL at 08:19

## 2023-11-21 RX ADMIN — Medication 250 MG: at 08:22

## 2023-11-21 RX ADMIN — ASPIRIN 81 MG: 81 TABLET, COATED ORAL at 08:20

## 2023-11-21 RX ADMIN — BUDESONIDE AND FORMOTEROL FUMARATE DIHYDRATE 2 PUFF: 160; 4.5 AEROSOL RESPIRATORY (INHALATION) at 07:54

## 2023-11-21 RX ADMIN — FUROSEMIDE 10 MG: 20 TABLET ORAL at 08:21

## 2023-11-21 RX ADMIN — APIXABAN 5 MG: 5 TABLET, FILM COATED ORAL at 21:38

## 2023-11-21 NOTE — CASE MANAGEMENT/SOCIAL WORK
Continued Stay Note  [facility]     Patient Name: Catherine Boyer  MRN: [id number]  Today's Date: 11/21/2023    Admit Date: 11/2/2023    Plan: [facility] PENDING expedited appeal   Discharge Plan       Row Name 11/21/23 1716       Plan    Plan Comments CCP received a call from Chelsea/ [employer] stating that she has received the physician's progress note w/ rationale for SNF.  They are reviewing the info now.  Their determination is pending at this time. .........Cherry CONTRERAS/ [facility]                   Discharge Codes    No documentation.                 Expected Discharge Date and Time       Expected Discharge Date Expected Discharge Time    Nov 22, 2023               Cherry Bowen RN

## 2023-11-21 NOTE — THERAPY TREATMENT NOTE
Patient Name: Catherine Boyer  : 1940    MRN: [id number]                              Today's Date: 2023       Admit Date: 2023    Visit Dx:     ICD-10-CM ICD-9-CM   1. Acute renal failure, unspecified acute renal failure type  N17.9 584.9   2. Acute abdominal pain  R10.9 789.00     338.19   3. Nausea vomiting and diarrhea  R11.2 787.91    R19.7 787.01   4. Pulmonary infiltrate  R91.8 793.19     Patient Active Problem List   Diagnosis    Acute on chronic diastolic heart failure    CAD (coronary artery disease)    Nonbacterial thrombotic endocarditis    Paroxysmal atrial fibrillation    Myeloproliferative disorder    Microcytic anemia    Type 2 diabetes mellitus with circulatory disorder, without long-term current use of insulin    GERD (gastroesophageal reflux disease)    Hypertension    Personal history of transient ischemic attack (TIA), 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 deficits    Porcelain gallbladder    Breast cancer    Atherosclerosis of abdominal aorta    APC (atrial premature contractions)    Moderate malnutrition    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    Elevated troponin    TIA (transient ischemic attack)    PUD (peptic ulcer disease)    Anemia    Tachycardia    Myelofibrosis    Normocytic anemia    Acute kidney injury    Pulmonary infiltrate    Iatrogenic pneumothorax     Past Medical History:   Diagnosis Date    Atherosclerosis of abdominal aorta 2023    Atrial fibrillation     Breast cancer     CAD (coronary artery disease)     NSTEMI 2022: 90% ostial LAD, 99% D1, 70% mid-distal LAD (medical therapy). She received two stents (2.5x18 and 2.5x26mm Finesse LEFTY) but I don't know which one went to which lesion.    Carotid atherosclerosis     Cholecystitis 2022    Added automatically from request for surgery [id number]    Chronic diastolic (congestive) heart failure     COVID 10/29/2022    GERD (gastroesophageal reflux disease)     Glaucoma     History of cataract     Hypertension      Microcytic anemia     per Dr. oleg pate office  note 6/30/22-dd    Myeloproliferative disorder     JAK2 positive    Nonbacterial thrombotic endocarditis     6/2021: 4x5mm vegetation on the ventricular surface of the anterior MV, negative blood cultures    Porcelain gallbladder     PUD (peptic ulcer disease)     TIA (transient ischemic attack)     Type 2 diabetes mellitus     Type 2 diabetes mellitus with circulatory disorder, without long-term current use of insulin 07/14/2022    Upper GI bleed      Past Surgical History:   Procedure Laterality Date    BREAST LUMPECTOMY  1999    BRONCHOSCOPY Bilateral 11/8/2023    Procedure: BRONCHOSCOPY UNDER FLUORO WITH BAL,  BIOPSIES; ACETYLCYSTEIN 4ML GIVEN;  Surgeon: Raoul Alford MD;  Location: [facility] ENDOSCOPY;  Service: Pulmonary;  Laterality: Bilateral;  PRE- PULMONARY INFILTRATES  POST- SAME    CARDIAC CATHETERIZATION N/A 09/02/2022    Procedure: Coronary angiography;  Surgeon: Guero Verde MD;  Location: [facility] CATH INVASIVE LOCATION;  Service: Cardiovascular;  Laterality: N/A;    CARDIAC CATHETERIZATION N/A 09/02/2022    Procedure: Stent LEFTY coronary;  Surgeon: Guero Verde MD;  Location: [facility] CATH INVASIVE LOCATION;  Service: Cardiovascular;  Laterality: N/A;    CATARACT EXTRACTION  2011    CHOLECYSTECTOMY      CHOLECYSTECTOMY WITH INTRAOPERATIVE CHOLANGIOGRAM N/A 11/28/2022    Procedure: CHOLECYSTECTOMY LAPAROSCOPIC INTRAOPERATIVE CHOLANGIOGRAM;  Surgeon: Dani Grover Jr., MD;  Location: [facility] MAIN OR;  Service: General;  Laterality: N/A;    COLONOSCOPY N/A 02/09/2023    Procedure: COLONOSCOPY TO CECUM & T.I.;  Surgeon: Guero Ferris MD;  Location: [facility] ENDOSCOPY;  Service: Gastroenterology;  Laterality: N/A;  PRE- MELENA, GI BLEED  POST- DIVERTICULOSIS, INT HEMORRHOIDS    ENDOSCOPY N/A 01/25/2023    Procedure: ESOPHAGOGASTRODUODENOSCOPY WITH BIOPSIES;  Surgeon: Charles Diaz MD;  Location: [facility] ENDOSCOPY;  Service: Gastroenterology;   Laterality: N/A;  pre: abd pain, nausea  post: hiatal hernia, mild gastritis, sloughing of the esophagus    ENTEROSCOPY SMALL BOWEL N/A 02/09/2023    Procedure: ENTEROSCOPY SMALL BOWEL;  Surgeon: Guero Ferris MD;  Location: [facility] ENDOSCOPY;  Service: Gastroenterology;  Laterality: N/A;  PRE- MELENA, GI BLEED  POST- HIATAL HERNIA    JOINT REPLACEMENT      UPPER GASTROINTESTINAL ENDOSCOPY        General Information       Row Name 11/21/23 1624          Physical Therapy Time and Intention    Document Type therapy note (daily note)  -     Mode of Treatment physical therapy  -       Row Name 11/21/23 1624          General Information    Existing Precautions/Restrictions fall;oxygen therapy device and L/min  -       Row Name 11/21/23 1624          Cognition    Orientation Status (Cognition) oriented x 4  -               User Key  (r) = Recorded By, (t) = Taken By, (c) = Cosigned By      Initials Name Provider Type     Sarah Su PTA Physical Therapist Assistant                   Mobility       Row Name 11/21/23 1624          Bed Mobility    Bed Mobility supine-sit;sit-supine  -     Supine-Sit [place] (Bed Mobility) supervision  -     Sit-Supine [place] (Bed Mobility) supervision  -     Assistive Device (Bed Mobility) bed rails;head of bed elevated  -       Row Name 11/21/23 1624          Sit-Stand Transfer    Sit-Stand [place] (Transfers) standby assist  -     Assistive Device (Sit-Stand Transfers) walker, front-wheeled  -     Comment, (Sit-Stand Transfer) bed height elevated  -       Row Name 11/21/23 1624          Gait/Stairs (Locomotion)    [place] Level (Gait) contact guard  -     Assistive Device (Gait) walker, front-wheeled  -     Distance in Feet (Gait) 40  -     Deviations/Abnormal Patterns (Gait) jeanna decreased;stride length decreased  -     Bilateral Gait Deviations forward flexed posture  -               User Key  (r) = Recorded By, (t) =  Taken By, (c) = Cosigned By      Initials Name Provider Type    Sarah Donovan PTA Physical Therapist Assistant                   Obj/Interventions       Row Name 11/21/23 1625          Motor Skills    Therapeutic Exercise --  standing heel/toe raises, marches, mini squats x10 reps  -               User Key  (r) = Recorded By, (t) = Taken By, (c) = Cosigned By      Initials Name Provider Type    Sarah Donovan PTA Physical Therapist Assistant                   Goals/Plan    No documentation.                  Clinical Impression       Row Name 11/21/23 1625          Pain    Pretreatment Pain Rating 0/10 - no pain  -SM     Posttreatment Pain Rating 0/10 - no pain  -SM       [facility] Name 11/21/23 1625          Positioning and Restraints    Pre-Treatment Position in bed  -     Post Treatment Position bed  -     In Bed supine;call light within reach;encouraged to call for assist;with family/caregiver  -               User Key  (r) = Recorded By, (t) = Taken By, (c) = Cosigned By      Initials Name Provider Type    Sarah Donovan PTA Physical Therapist Assistant                   Outcome Measures       Row Name 11/21/23 1639 11/21/23 0828       How much help from another person do you currently need...    Turning from your back to your side while in flat bed without using bedrails? 3  -SM 4  -LW    Moving from lying on back to sitting on the side of a flat bed without bedrails? 3  -SM 4  -LW    Moving to and from a bed to a chair (including a wheelchair)? 3  -SM 3  -LW    Standing up from a chair using your arms (e.g., wheelchair, bedside chair)? 3  -SM 3  -LW    Climbing 3-5 steps with a railing? 2  -SM 3  -LW    To walk in hospital room? 3  -SM 3  -LW    AM-PAC 6 Clicks Score (PT) 17  -SM 20  -LW    Highest Level of Mobility Goal 5 --> Static standing  - 6 --> Walk 10 steps or more  -      Row Name 11/21/23 0600          How much help from another person do you currently need...     Turning from your back to your side while in flat bed without using bedrails? 4  -AH     Moving from lying on back to sitting on the side of a flat bed without bedrails? 4  -AH     Moving to and from a bed to a chair (including a wheelchair)? 3  -AH     Standing up from a chair using your arms (e.g., wheelchair, bedside chair)? 3  -AH     Climbing 3-5 steps with a railing? 3  -AH     To walk in hospital room? 3  -AH     AM-PAC 6 Clicks Score (PT) 20  -     Highest Level of Mobility Goal 6 --> Walk 10 steps or more  -       Row Name 11/21/23 1639          Functional Assessment    Outcome Measure Options AM-PAC 6 Clicks Basic Mobility (PT)  -               User Key  (r) = Recorded By, (t) = Taken By, (c) = Cosigned By      Initials Name Provider Type    TAMIKA Willow Valles RN Registered Nurse    Sarah Donovan PTA Physical Therapist Assistant    Annika May RN Registered Nurse                                 Physical Therapy Education       Title: PT OT SLP Therapies (In Progress)       Topic: Physical Therapy (Done)       Point: Mobility training (Done)       Learning Progress Summary             Patient Acceptance, E,TB,D, VU,NR by  at 11/21/2023 1640    Acceptance, E,TB,D, VU,NR by  at 11/20/2023 1418    Acceptance, E, NR by  at 11/17/2023 1500    Acceptance, E,TB,D, VU,NR by  at 11/16/2023 1522    Acceptance, E,TB,D, VU,NR by  at 11/15/2023 1648    Acceptance, E,TB,D, VU,NR by  at 11/10/2023 1511    Acceptance, E, VU by  at 11/10/2023 0407    Acceptance, E, VU by  at 11/9/2023 0432    Acceptance, E, VU by ML at 11/8/2023 0449    Acceptance, E, VU by ML at 11/7/2023 0517    Acceptance, E, VU by  at 11/6/2023 0459    Acceptance, E, VU by SK at 11/3/2023 1142   Family Acceptance, E, VU by SK at 11/3/2023 1142                         Point: Home exercise program (Done)       Learning Progress Summary             Patient Acceptance, E,TB,D, VU,NR by SM at 11/21/2023 1640    Acceptance,  E,TB,D, VU,NR by SM at 11/20/2023 1418    Acceptance, E, NR by SV at 11/17/2023 1500    Acceptance, E,TB,D, VU,NR by SM at 11/16/2023 1522    Acceptance, E,TB,D, VU,NR by SM at 11/15/2023 1648    Acceptance, E,TB,D, VU,NR by SM at 11/14/2023 1435    Acceptance, E,TB,D, VU,NR by SM at 11/10/2023 1511    Acceptance, E, VU by ML at 11/10/2023 0407    Acceptance, E, VU by ML at 11/9/2023 0432    Acceptance, E, VU by ML at 11/8/2023 0449    Acceptance, E, VU by ML at 11/7/2023 0517    Acceptance, E, VU by ML at 11/6/2023 0459                         Point: Body mechanics (Done)       Learning Progress Summary             Patient Acceptance, E,TB,D, VU,NR by SM at 11/21/2023 1640    Acceptance, E,TB,D, VU,NR by SM at 11/20/2023 1418    Acceptance, E, NR by SV at 11/17/2023 1500    Acceptance, E,TB,D, VU,NR by  at 11/16/2023 1522    Acceptance, E,TB,D, VU,NR by  at 11/15/2023 1648    Acceptance, E,TB,D, VU,NR by SM at 11/10/2023 1511    Acceptance, E, VU by ML at 11/10/2023 0407    Acceptance, E, VU by ML at 11/9/2023 0432    Acceptance, E, VU by ML at 11/8/2023 0449    Acceptance, E, VU by ML at 11/7/2023 0517    Acceptance, E, VU by ML at 11/6/2023 0459    Acceptance, E, VU by SK at 11/3/2023 1142   Family Acceptance, E, VU by SK at 11/3/2023 1142                         Point: Precautions (Done)       Learning Progress Summary             Patient Acceptance, E,TB,D, VU,NR by SM at 11/21/2023 1640    Acceptance, E,TB,D, VU,NR by SM at 11/20/2023 1418    Acceptance, E, NR by SV at 11/17/2023 1500    Acceptance, E,TB,D, VU,NR by SM at 11/16/2023 1522    Acceptance, E,TB,D, VU,NR by SM at 11/15/2023 1648    Acceptance, E,TB,D, VU,NR by SM at 11/10/2023 1511    Acceptance, E, VU by ML at 11/10/2023 0407    Acceptance, E, VU by ML at 11/9/2023 0432    Acceptance, E, VU by ML at 11/8/2023 0449    Acceptance, E, VU by ML at 11/7/2023 0517    Acceptance, E, VU by ML at 11/6/2023 0459    Acceptance, E, VU by SK at 11/3/2023  1142   Family Acceptance, E, VU by SK at 11/3/2023 1142                                         User Key       Initials Effective Dates Name Provider Type Discipline    SV 07/11/23 -  Clare Rodriguez, PT Physical Therapist PT     03/07/18 -  Sarah Su PTA Physical Therapist Assistant PT     06/15/23 -  Isatu Johnson RN Registered Nurse Nurse    SK 10/10/23 -  Nessa Barajas, OZIEL Student PT Student PT                  PT Recommendation and Plan     Plan of Care Reviewed With: patient  Progress: improving  Outcome Evaluation: Pt tolerated treatment well this date. Required SV for bed mobility, then SBA to stand w/ bed height elevated. Pt has difficulty when bed is in lower position, though her bed at [facility] higher. Pt ambulated 40ft w/ Rw and CGA, then required seated rest break before completing standing exercises. Limited d/t fatigue. Hopeful for d/c to SNF soon.     Time Calculation:         PT Charges       Row Name 11/21/23 1644             Time Calculation    Start Time 1535  -      Stop Time 1607  -      Time Calculation (min) 32 min  -      PT Received On 11/21/23  -      PT - Next Appointment 11/22/23  -                User Key  (r) = Recorded By, (t) = Taken By, (c) = Cosigned By      Initials Name Provider Type     Sarah Su PTA Physical Therapist Assistant                  Therapy Charges for Today       Code Description Service Date Service Provider Modifiers Qty    24947041367 HC GAIT TRAINING EA 15 MIN 11/20/2023 Sarah Su, PTA GP 1    85038813118 HC PT THER PROC EA 15 MIN 11/20/2023 Sarah Su PTA GP 1    27146540588 HC GAIT TRAINING EA 15 MIN 11/21/2023 Sarah Su PTA GP 1    59214427879 HC PT THER PROC EA 15 MIN 11/21/2023 Sarah Su, CHERYL GP 1            PT G-Codes  Outcome Measure Options: AM-PAC 6 Clicks Basic Mobility (PT)  AM-PAC 6 Clicks Score (PT): 17  AM-PAC 6 Clicks Score (OT): 16  PT Discharge  Summary  Anticipated Discharge Disposition (PT): skilled nursing facility    Sarah Su, PTA  11/21/2023

## 2023-11-21 NOTE — PLAN OF CARE
Goal Outcome Evaluation:  Plan of Care Reviewed With: patient        Progress: improving  Outcome Evaluation: VSS. No new complaints overnight. Patient slept well most of the night. Possible d/c today.          Problem: Adult Inpatient Plan of Care  Goal: Plan of Care Review  Outcome: Ongoing, Progressing  Flowsheets (Taken 11/21/2023 0609)  Progress: improving  Plan of Care Reviewed With: patient  Outcome Evaluation: VSS. No new complaints overnight. Patient slept well most of the night. Possible d/c today.  Goal: Patient-Specific Goal (Individualized)  Outcome: Ongoing, Progressing  Goal: Absence of Hospital-Acquired Illness or Injury  Outcome: Ongoing, Progressing  Intervention: Identify and Manage Fall Risk  Recent Flowsheet Documentation  Taken 11/21/2023 0421 by Willow Valles RN  Safety Promotion/Fall Prevention:   activity supervised   assistive device/personal items within reach   clutter free environment maintained   fall prevention program maintained   nonskid shoes/slippers when out of bed   room organization consistent   safety round/check completed  Taken 11/21/2023 0239 by Willow Valles RN  Safety Promotion/Fall Prevention:   activity supervised   assistive device/personal items within reach   clutter free environment maintained   fall prevention program maintained   nonskid shoes/slippers when out of bed   room organization consistent   safety round/check completed  Taken 11/21/2023 0017 by Willow Valles RN  Safety Promotion/Fall Prevention:   activity supervised   assistive device/personal items within reach   clutter free environment maintained   fall prevention program maintained   nonskid shoes/slippers when out of bed   room organization consistent   safety round/check completed  Taken 11/20/2023 2226 by Willow Valles RN  Safety Promotion/Fall Prevention:   activity supervised   assistive device/personal items within reach   clutter free environment maintained   fall prevention program maintained    nonskid shoes/slippers when out of bed   room organization consistent   safety round/check completed  Taken 11/20/2023 2000 by Willow Valles RN  Safety Promotion/Fall Prevention:   activity supervised   assistive device/personal items within reach   clutter free environment maintained   fall prevention program maintained   nonskid shoes/slippers when out of bed   room organization consistent   safety round/check completed  Intervention: Prevent Skin Injury  Recent Flowsheet Documentation  Taken 11/21/2023 0421 by Willow Valles RN  Body Position:   right   side-lying  Taken 11/21/2023 0239 by Willow Valles RN  Body Position:   right   side-lying  Taken 11/21/2023 0017 by Willow Valles RN  Body Position:   right   side-lying   position changed independently  Skin Protection:   adhesive use limited   incontinence pads utilized  Taken 11/20/2023 2226 by iWllow Valles RN  Body Position:   left   side-lying  Taken 11/20/2023 2000 by Willow Valles RN  Body Position:   position changed independently   left   side-lying  Skin Protection:   adhesive use limited   incontinence pads utilized  Intervention: Prevent and Manage VTE (Venous Thromboembolism) Risk  Recent Flowsheet Documentation  Taken 11/20/2023 2000 by Willow Valles RN  Activity Management: activity encouraged  VTE Prevention/Management:   bilateral   sequential compression devices off   patient refused intervention  Goal: Optimal Comfort and Wellbeing  Outcome: Ongoing, Progressing  Intervention: Provide Person-Centered Care  Recent Flowsheet Documentation  Taken 11/21/2023 0017 by Willow Valles RN  Trust Relationship/Rapport:   care explained   reassurance provided  Taken 11/20/2023 2000 by Willow Valles RN  Trust Relationship/Rapport:   care explained   choices provided   emotional support provided   questions answered   empathic listening provided   questions encouraged   reassurance provided   thoughts/feelings acknowledged  Goal: Readiness for Transition of  Care  Outcome: Ongoing, Progressing     Problem: Fall Injury Risk  Goal: Absence of Fall and Fall-Related Injury  Outcome: Ongoing, Progressing  Intervention: Identify and Manage Contributors  Recent Flowsheet Documentation  Taken 11/20/2023 2000 by Willow Valles RN  Medication Review/Management: medications reviewed  Intervention: Promote Injury-Free Environment  Recent Flowsheet Documentation  Taken 11/21/2023 0421 by Willow Valles RN  Safety Promotion/Fall Prevention:   activity supervised   assistive device/personal items within reach   clutter free environment maintained   fall prevention program maintained   nonskid shoes/slippers when out of bed   room organization consistent   safety round/check completed  Taken 11/21/2023 0239 by Willow Valles RN  Safety Promotion/Fall Prevention:   activity supervised   assistive device/personal items within reach   clutter free environment maintained   fall prevention program maintained   nonskid shoes/slippers when out of bed   room organization consistent   safety round/check completed  Taken 11/21/2023 0017 by Willow Valles RN  Safety Promotion/Fall Prevention:   activity supervised   assistive device/personal items within reach   clutter free environment maintained   fall prevention program maintained   nonskid shoes/slippers when out of bed   room organization consistent   safety round/check completed  Taken 11/20/2023 2226 by Willow Valles RN  Safety Promotion/Fall Prevention:   activity supervised   assistive device/personal items within reach   clutter free environment maintained   fall prevention program maintained   nonskid shoes/slippers when out of bed   room organization consistent   safety round/check completed  Taken 11/20/2023 2000 by Willow Valles RN  Safety Promotion/Fall Prevention:   activity supervised   assistive device/personal items within reach   clutter free environment maintained   fall prevention program maintained   nonskid shoes/slippers when out  of bed   room organization consistent   safety round/check completed     Problem: Skin Injury Risk Increased  Goal: Skin Health and Integrity  Outcome: Ongoing, Progressing  Intervention: Optimize Skin Protection  Recent Flowsheet Documentation  Taken 11/21/2023 0421 by Willow Valles RN  Head of Bed (HOB) Positioning: HOB at 15 degrees  Taken 11/21/2023 0239 by Willow Valles RN  Head of Bed (HOB) Positioning: HOB at 15 degrees  Taken 11/21/2023 0017 by Willow Valles RN  Pressure Reduction Techniques:   frequent weight shift encouraged   weight shift assistance provided  Head of Bed (HOB) Positioning: HOB at 15 degrees  Pressure Reduction Devices: pressure-redistributing mattress utilized  Skin Protection:   adhesive use limited   incontinence pads utilized  Taken 11/20/2023 2226 by Willow Valles RN  Head of Bed (HOB) Positioning: HOB at 15 degrees  Taken 11/20/2023 2000 by Willow Valles RN  Pressure Reduction Techniques: frequent weight shift encouraged  Head of Bed (HOB) Positioning: HOB flat  Pressure Reduction Devices: pressure-redistributing mattress utilized  Skin Protection:   adhesive use limited   incontinence pads utilized     Problem: Chest Pain  Goal: Resolution of Chest Pain Symptoms  Outcome: Ongoing, Progressing

## 2023-11-21 NOTE — PROGRESS NOTES
[facility]     LOS: 19 days     Subjective:    CC:Abdominal Pain, Nausea, and Vomiting    DIET:  Diet Order   Procedures    Diet: Cardiac Diets, Diabetic Diets; Healthy Heart (2-3 Na+); Consistent Carbohydrate; Texture: Regular Texture (IDDSI 7); Fluid Consistency: Thin (IDDSI 0)     No new complaints    Objective:    Vital Signs:  Temp:  [98.2 °F (36.8 °C)-98.8 °F (37.1 °C)] 98.6 °F (37 °C)  Heart Rate:  [] 106  Resp:  [16-18] 18  BP: (103-127)/(51-68) 103/64    SpO2:  [94 %-96 %] 95 %  on  Flow (L/min):  [3] 3;   Device (Oxygen Therapy): nasal cannula  Body mass index is 23.74 kg/m².    Physical Exam  Constitutional:       Appearance: Normal appearance.   HENT:      Head: Normocephalic and atraumatic.   Cardiovascular:      Rate and Rhythm: Normal rate and regular rhythm.      Heart sounds: No murmur heard.     No friction rub.   Pulmonary:      Effort: Pulmonary effort is normal.      Breath sounds: Wheezing present.   Abdominal:      General: Bowel sounds are normal. There is no distension.      Palpations: Abdomen is soft.      Tenderness: There is no abdominal tenderness.   Skin:     General: Skin is warm and dry.   Neurological:      General: No focal deficit present.      Mental Status: She is alert.         Results Review:    Glucose   Date Value Ref Range Status   11/21/2023 75 65 - 99 mg/dL Final   11/20/2023 123 (H) 65 - 99 mg/dL Final   11/19/2023 123 (H) 65 - 99 mg/dL Final     Results from last 7 days   Lab Units 11/21/23  0603   WBC 10*3/mm3 56.47*   HEMOGLOBIN g/dL 9.7*   HEMATOCRIT % 30.7*   PLATELETS 10*3/mm3 223     Results from last 7 days   Lab Units 11/21/23  0603   SODIUM mmol/L 135*   POTASSIUM mmol/L 3.8   CHLORIDE mmol/L 93*   CO2 mmol/L 27.0   BUN mg/dL 25*   CREATININE mg/dL 0.99   CALCIUM mg/dL 9.4   GLUCOSE mg/dL 75         Results from last 7 days   Lab Units 11/15/23  2252   MAGNESIUM mg/dL 2.1     Results from last 7 days   Lab Units  "11/15/23  2349 11/15/23  2252   HSTROP T ng/L 33* 33*     Cultures:  No results found for: \"BLOODCX\", \"URINECX\", \"WOUNDCX\", \"MRSACX\", \"RESPCX\", \"STOOLCX\"    I have reviewed daily medications and changes in CPOE    Scheduled meds  apixaban, 5 mg, Oral, Q12H  aspirin, 81 mg, Oral, Daily  budesonide-formoterol, 2 puff, Inhalation, BID - RT  famotidine, 20 mg, Oral, Daily  furosemide, 10 mg, Oral, Daily  latanoprost, 1 drop, Both Eyes, Nightly  lidocaine, 20 mL, Infiltration, Once  metoprolol succinate XL, 12.5 mg, Oral, Nightly  metoprolol succinate XL, 25 mg, Oral, Daily  Momelotinib Dihydrochloride, 200 mg, Oral, Daily  predniSONE, 40 mg, Oral, Daily With Breakfast   Followed by  [START ON 11/23/2023] predniSONE, 30 mg, Oral, Daily With Breakfast   Followed by  [START ON 11/26/2023] predniSONE, 20 mg, Oral, Daily With Breakfast   Followed by  [START ON 11/29/2023] predniSONE, 10 mg, Oral, Daily With Breakfast  saccharomyces boulardii, 250 mg, Oral, BID  senna-docusate sodium, 2 tablet, Oral, BID  sertraline, 100 mg, Oral, Daily  tiotropium bromide monohydrate, 2 puff, Inhalation, Daily - RT           PRN meds    acetaminophen    albuterol    senna-docusate sodium **AND** polyethylene glycol **AND** bisacodyl **AND** bisacodyl    LORazepam    melatonin    nitroglycerin    ondansetron **OR** ondansetron    Phosphorus Replacement - Follow Nurse / BPA Driven Protocol    Potassium Replacement - Follow Nurse / BPA Driven Protocol    simethicone    Insert Peripheral IV **AND** sodium chloride        Acute kidney injury    CAD (coronary artery disease)    Paroxysmal atrial fibrillation    Myeloproliferative disorder    Type 2 diabetes mellitus with circulatory disorder, without long-term current use of insulin    Hypertension    Personal history of transient ischemic attack (TIA), 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 deficits    Myelofibrosis    Pulmonary infiltrate    Iatrogenic " pneumothorax        Assessment/Plan:      83 y.o. female admitted with Acute kidney injury.     Acute Cystitis without Hematuria  - Urine culture grew ESBL.  - Completed ertapenem     KARLI  - Resolved with IVF  - Appreciate nephrology recs- lasix restarted     Lung mass  Bilateral pulmonary infiltrates  - s/p bronchoscopy 11/8/23 with biopsies, pathology showing no evidence of malignancy but only 3 biopsies taken  - has small iatrogenic pneumothorax, improved with conservative treatment  - follow up chest CT scan without contrast in 6-8 weeks  - appreciate pulmonology recs, they have signed off-follow up with [facility] for Dr. Billings, in 8 weeks  - transition patient from IV steroids to oral steroid taper, d/w Dr Alford and will taper the steroids slightly faster than previouslyprescribed     Aortic Stenosis/Chronic Diastolic CHF  Coronary artery disease  Paroxysmal atrial fibrillation  Hypertension  - Continue Eliquis, lasix, metoprolol  - no plavix at DC- cards rec resuming ASA 81mg- started 11/15   - troponin is stable/consistent with previous troponin, EKG with sinus tach, no ischemic changes noted.     Myeloproliferative disorder/Anemia  Functional iron deficiency  Leukocytosis  - Has chronic leukocytosis, values elevated on most recent labs, however, relatively stable from prior.  - s/p 1 unit of PRBCs 11/6/23 and 11/7/23 with good response; also received ferrelicit  - oncology evaluated, o/p f/u planned- signed off  - daily cbc     Anxiety  -Start low-dose Ativan while inpatient 0.25mg q6h prn     HR high at times, stop duonebs, start symbicort and spiriva  Cardiology added small dose of metoprolol, HR is better    Awaiting precert    The patient is not back to baseline mobility, previously lives in independent living, for me when standing she required assistance, the patient had to give tremendous effort to stand by herself. She is highly motivated to get back to her independent level of care when  safe. She is also on continuous O2 and would benefit from weekly visits by a provider, concern for possible readmit potential. She has been hospitalized 3 times in last 4 months and 7 times in last year.    Eduard Rees MD  11/21/23  09:57 EST

## 2023-11-21 NOTE — PROGRESS NOTES
"Nutrition Services    Patient Name:  Catherine Boyer  YOB: 1940  MRN: [id number]  Admit Date:  11/2/2023    Assessment Date:  11/21/23    Summary: Follow up   Pt tolerating HH/DM diet with 25-50% po intake and drinking 75% of Boost GC. Pt with slightly decreased po intake today due to feeling tired. Awaiting SNF placement.     Recommend:  Continue Boost GC daily at breakfast.    RD to follow per protocol.     CLINICAL NUTRITION ASSESSMENT      Reason for Assessment Follow-up Protocol     Diagnosis/Problem KARLI     Anthropometrics        Current Height  Current Weight  BMI kg/m2 Height: 162 cm (63.78\")  Weight: 62.3 kg (137 lb 5.6 oz) (11/21/23 0647)  Body mass index is 23.74 kg/m².   Adjusted BMI (if applicable)    BMI Category Overweight (25 - 29.9)   Ideal Body Weight (IBW) 119 lb (54.2 kg)   Usual Body Weight (UBW) Unsure   Weight Trend Other: fluctuations, possible gain noted       --  Estimated/Assessed Needs        Current Weight  Weight: 62.3 kg (137 lb 5.6 oz) (11/21/23 0647)       Energy Requirements    Weight for Calculation 149 lb (67.6 kg)   Method for Estimation  30-35 kcal/kg   EST Needs (kcal/day) 9485-6946       Protein Requirements    Weight for Calculation 149 lb (67.6 kg)   EST Protein Needs (g/kg) 1.2 - 1.5 gm/kg   EST Daily Needs (g/day) 81-101g       Fluid Requirements     Method for Estimation 1 mL/kcal    EST Needs (mL/day)      Labs       Pertinent Labs    Results from last 7 days   Lab Units 11/21/23  0603 11/20/23  0603 11/19/23  1702 11/19/23  0501   SODIUM mmol/L 135* 136  --  139   POTASSIUM mmol/L 3.8 3.9 5.1 3.5   CHLORIDE mmol/L 93* 93*  --  96*   CO2 mmol/L 27.0 29.9*  --  27.8   BUN mg/dL 25* 18  --  18   CREATININE mg/dL 0.99 0.86  --  0.99   CALCIUM mg/dL 9.4 9.4  --  9.0   GLUCOSE mg/dL 75 123*  --  123*     Results from last 7 days   Lab Units 11/21/23  0603 11/16/23  0547 11/15/23  2252   MAGNESIUM mg/dL  --   --  2.1   PHOSPHORUS mg/dL 2.9   < >  --  "   HEMOGLOBIN g/dL 9.7*   < >  --    HEMATOCRIT % 30.7*   < >  --    WBC 10*3/mm3 56.47*   < >  --    ALBUMIN g/dL 3.9   < >  --     < > = values in this interval not displayed.     Results from last 7 days   Lab Units 11/21/23  0603 11/20/23  0603 11/19/23  0501 11/18/23  0540 11/17/23  0527   PLATELETS 10*3/mm3 223 241 208 199 200     COVID19   Date Value Ref Range Status   11/08/2023 Not Detected Not Detected - Ref. Range Final     Lab Results   Component Value Date    HGBA1C 6.60 (H) 09/29/2023          Medications           Scheduled Medications apixaban, 5 mg, Oral, Q12H  aspirin, 81 mg, Oral, Daily  budesonide-formoterol, 2 puff, Inhalation, BID - RT  cadexomer iodine, 1 application , Topical, Daily  famotidine, 20 mg, Oral, Daily  furosemide, 10 mg, Oral, Daily  latanoprost, 1 drop, Both Eyes, Nightly  lidocaine, 20 mL, Infiltration, Once  metoprolol succinate XL, 12.5 mg, Oral, Nightly  metoprolol succinate XL, 25 mg, Oral, Daily  Momelotinib Dihydrochloride, 200 mg, Oral, Daily  [START ON 11/22/2023] predniSONE, 20 mg, Oral, Daily With Breakfast   Followed by  [START ON 11/29/2023] predniSONE, 15 mg, Oral, Daily With Breakfast   Followed by  [START ON 12/6/2023] predniSONE, 10 mg, Oral, Daily With Breakfast   Followed by  [START ON 12/13/2023] predniSONE, 5 mg, Oral, Daily With Breakfast  saccharomyces boulardii, 250 mg, Oral, BID  senna-docusate sodium, 2 tablet, Oral, BID  sertraline, 100 mg, Oral, Daily  tiotropium bromide monohydrate, 2 puff, Inhalation, Daily - RT       Infusions     PRN Medications   acetaminophen    albuterol    senna-docusate sodium **AND** polyethylene glycol **AND** bisacodyl **AND** bisacodyl    LORazepam    melatonin    nitroglycerin    ondansetron **OR** ondansetron    Phosphorus Replacement - Follow Nurse / BPA Driven Protocol    Potassium Replacement - Follow Nurse / BPA Driven Protocol    simethicone    Insert Peripheral IV **AND** sodium chloride     Physical Findings           General Findings alert, overweight, on oxygen therapy   Oral/Mouth Cavity tooth or teeth missing   Edema  no edema   Gastrointestinal diarrhea, fecal incontinence, non-distended , last bowel movement: 11/21   Skin  bruising, excoriation, MASD, pressure injury: st II L leg, st II L 4th toe, location of wound: L medial plantar wound, bilateral abdomen wound   Tubes/Drains/Lines none   NFPE Not indicated at this time   --  Current Nutrition Orders & Evaluation of Intake       Oral Nutrition     Food Allergies NKFA   Current PO Diet Diet: Cardiac Diets, Diabetic Diets; Healthy Heart (2-3 Na+); Consistent Carbohydrate; Texture: Regular Texture (IDDSI 7); Fluid Consistency: Thin (IDDSI 0)   Supplement Boost Glucose Control, Daily 75%   PO Evaluation     % PO Intake 25-50%    Factors Affecting Intake: diarrhea   --  PES STATEMENT / NUTRITION DIAGNOSIS      Nutrition Dx Problem  Problem: Increased Nutrient Needs  Etiology: Medical Diagnosis - pressure injuries    Signs/Symptoms: Report/Observation     NUTRITION INTERVENTION / PLAN OF CARE      Intervention Goal(s) Maintain nutrition status, Reduce/improve symptoms, Meet estimated needs, Disease management/therapy, Increase intake, and No significant weight loss         RD Intervention/Action Encourage intake, Continue to monitor, and Care plan reviewed   --      Prescription/Orders:       PO Diet       Supplements Boost GC daily with breakfast       Enteral Nutrition       Parenteral Nutrition    New Prescription Ordered? Continue same per protocol   --      Monitor/Evaluation Per protocol, PO intake, Supplement intake, Pertinent labs, Skin status, Symptoms   Discharge Plan/Needs Pending clinical course   --    RD to follow per protocol.      Electronically signed by:  Anh Marcelo RD, Dietitian Intern   11/21/23 18:47 EST

## 2023-11-21 NOTE — PLAN OF CARE
Goal Outcome Evaluation:  Plan of Care Reviewed With: patient        Progress: improving  Outcome Evaluation: Pt tolerated treatment well this date. Required SV for bed mobility, then SBA to stand w/ bed height elevated. Pt has difficulty when bed is in lower position, though her bed at [facility] is higher. Pt ambulated 40ft w/ Rw and CGA, then required seated rest break before completing standing exercises. Limited d/t fatigue. Hopeful for d/c to SNF soon.      Anticipated Discharge Disposition (PT): skilled nursing facility

## 2023-11-21 NOTE — CASE MANAGEMENT/SOCIAL WORK
Continued Stay Note  [facility]     Patient Name: Catherine Boyer  MRN: [id number]  Today's Date: 11/21/2023    Admit Date: 11/2/2023    Plan: [facility] PENDING expedited appeal   Discharge Plan       Row Name 11/21/23 1027       Plan    Plan [facility] PENDING expedited appeal    Plan Comments Per [facility] request, physician's progress note faxed to them discussing rationale for SNF need (fax: [phone]).  Pt's son Karan notified that the progress note has been faxed. ..........Cherry CONTRERAS/ RENITA                   Discharge Codes    No documentation.                 Expected Discharge Date and Time       Expected Discharge Date Expected Discharge Time    Nov 22, 2023               Cherry Bowen RN

## 2023-11-21 NOTE — NURSING NOTE
"CWON note: pt seen for evaluation of wounds to left foot bunion area, 2nd, and 3rd toes. Wounds are clean, scabbed with some mild erythema. Pt reports she \"picks\" at these wounds so they don't heal. Wounds were cleansed and left open to air for now, I have ordered some iodosrob gel to treat the wounds once it arrives from pharmacy. This can be applied daily with dry dressing daily. Hopefully keeping these covered will discourage pt from picking at them and they will have a chance to heal. I did ask the pt if I could evaluate the MASD to her sacral skin and groin area, but she refused stating, \"the nurses are putting cream on it and its so much better\".  [facility] nurse will sign off, please re-consult for any additional needs.   "

## 2023-11-22 ENCOUNTER — TELEPHONE (OUTPATIENT)
Dept: ONCOLOGY | Facility: CLINIC | Age: 83
End: 2023-11-22
Payer: MEDICARE

## 2023-11-22 ENCOUNTER — READMISSION MANAGEMENT (OUTPATIENT)
Dept: CALL CENTER | Facility: HOSPITAL | Age: 83
End: 2023-11-22
Payer: MEDICARE

## 2023-11-22 VITALS
HEART RATE: 121 BPM | OXYGEN SATURATION: 96 % | BODY MASS INDEX: 23.45 KG/M2 | WEIGHT: 137.35 LBS | TEMPERATURE: 98.6 F | RESPIRATION RATE: 18 BRPM | DIASTOLIC BLOOD PRESSURE: 66 MMHG | SYSTOLIC BLOOD PRESSURE: 108 MMHG | HEIGHT: 64 IN

## 2023-11-22 LAB
ALBUMIN SERPL-MCNC: 4.1 G/DL (ref 3.5–5.2)
ANION GAP SERPL CALCULATED.3IONS-SCNC: 18.1 MMOL/L (ref 5–15)
BUN SERPL-MCNC: 25 MG/DL (ref 8–23)
BUN/CREAT SERPL: 30.9 (ref 7–25)
CALCIUM SPEC-SCNC: 9.3 MG/DL (ref 8.6–10.5)
CHLORIDE SERPL-SCNC: 94 MMOL/L (ref 98–107)
CO2 SERPL-SCNC: 26.9 MMOL/L (ref 22–29)
CREAT SERPL-MCNC: 0.81 MG/DL (ref 0.57–1)
DEPRECATED RDW RBC AUTO: 64.4 FL (ref 37–54)
EGFRCR SERPLBLD CKD-EPI 2021: 71.2 ML/MIN/1.73
ERYTHROCYTE [DISTWIDTH] IN BLOOD BY AUTOMATED COUNT: 19.2 % (ref 12.3–15.4)
FUNGUS WND CULT: NORMAL
GLUCOSE SERPL-MCNC: 76 MG/DL (ref 65–99)
HCT VFR BLD AUTO: 31.7 % (ref 34–46.6)
HGB BLD-MCNC: 9.9 G/DL (ref 12–15.9)
LYMPHOCYTES # BLD MANUAL: 2.55 10*3/MM3 (ref 0.7–3.1)
LYMPHOCYTES NFR BLD MANUAL: 4 % (ref 5–12)
MCH RBC QN AUTO: 29.2 PG (ref 26.6–33)
MCHC RBC AUTO-ENTMCNC: 31.2 G/DL (ref 31.5–35.7)
MCV RBC AUTO: 93.5 FL (ref 79–97)
METAMYELOCYTES NFR BLD MANUAL: 1 % (ref 0–0)
MONOCYTES # BLD: 2.55 10*3/MM3 (ref 0.1–0.9)
MYCOBACTERIUM SPEC CULT: NORMAL
MYELOCYTES NFR BLD MANUAL: 3 % (ref 0–0)
NEUTROPHILS # BLD AUTO: 56.1 10*3/MM3 (ref 1.7–7)
NEUTROPHILS NFR BLD MANUAL: 88 % (ref 42.7–76)
NIGHT BLUE STAIN TISS: NORMAL
PHOSPHATE SERPL-MCNC: 2.8 MG/DL (ref 2.5–4.5)
PLAT MORPH BLD: NORMAL
PLATELET # BLD AUTO: 226 10*3/MM3 (ref 140–450)
PMV BLD AUTO: 12.4 FL (ref 6–12)
POTASSIUM SERPL-SCNC: 3.8 MMOL/L (ref 3.5–5.2)
RBC # BLD AUTO: 3.39 10*6/MM3 (ref 3.77–5.28)
ROULEAUX BLD QL SMEAR: ABNORMAL
SODIUM SERPL-SCNC: 139 MMOL/L (ref 136–145)
VARIANT LYMPHS NFR BLD MANUAL: 4 % (ref 19.6–45.3)
WBC MORPH BLD: NORMAL
WBC NRBC COR # BLD AUTO: 63.75 10*3/MM3 (ref 3.4–10.8)

## 2023-11-22 PROCEDURE — 94760 N-INVAS EAR/PLS OXIMETRY 1: CPT

## 2023-11-22 PROCEDURE — 94664 DEMO&/EVAL PT USE INHALER: CPT

## 2023-11-22 PROCEDURE — 85025 COMPLETE CBC W/AUTO DIFF WBC: CPT | Performed by: INTERNAL MEDICINE

## 2023-11-22 PROCEDURE — 63710000001 PREDNISONE PER 1 MG: Performed by: HOSPITALIST

## 2023-11-22 PROCEDURE — 85007 BL SMEAR W/DIFF WBC COUNT: CPT | Performed by: INTERNAL MEDICINE

## 2023-11-22 PROCEDURE — 94761 N-INVAS EAR/PLS OXIMETRY MLT: CPT

## 2023-11-22 PROCEDURE — 94799 UNLISTED PULMONARY SVC/PX: CPT

## 2023-11-22 PROCEDURE — 80069 RENAL FUNCTION PANEL: CPT | Performed by: INTERNAL MEDICINE

## 2023-11-22 RX ORDER — POLYETHYLENE GLYCOL 3350 17 G/17G
17 POWDER, FOR SOLUTION ORAL DAILY PRN
Status: ON HOLD
Start: 2023-11-22

## 2023-11-22 RX ORDER — BISACODYL 10 MG
10 SUPPOSITORY, RECTAL RECTAL DAILY PRN
Status: ON HOLD
Start: 2023-11-22

## 2023-11-22 RX ORDER — PREDNISONE 5 MG/1
TABLET ORAL
Qty: 66 TABLET | Refills: 0 | Status: ON HOLD | OUTPATIENT
Start: 2023-11-23 | End: 2023-12-19

## 2023-11-22 RX ORDER — ASPIRIN 81 MG/1
81 TABLET ORAL DAILY
Status: ON HOLD
Start: 2023-11-23

## 2023-11-22 RX ORDER — FUROSEMIDE 20 MG/1
10 TABLET ORAL DAILY
Status: ON HOLD
Start: 2023-11-23

## 2023-11-22 RX ORDER — BUDESONIDE AND FORMOTEROL FUMARATE DIHYDRATE 160; 4.5 UG/1; UG/1
2 AEROSOL RESPIRATORY (INHALATION)
Status: ON HOLD
Start: 2023-11-22

## 2023-11-22 RX ADMIN — FAMOTIDINE 20 MG: 20 TABLET ORAL at 08:37

## 2023-11-22 RX ADMIN — Medication 250 MG: at 08:37

## 2023-11-22 RX ADMIN — BUDESONIDE AND FORMOTEROL FUMARATE DIHYDRATE 2 PUFF: 160; 4.5 AEROSOL RESPIRATORY (INHALATION) at 07:25

## 2023-11-22 RX ADMIN — APIXABAN 5 MG: 5 TABLET, FILM COATED ORAL at 08:37

## 2023-11-22 RX ADMIN — ASPIRIN 81 MG: 81 TABLET, COATED ORAL at 08:36

## 2023-11-22 RX ADMIN — PREDNISONE 20 MG: 20 TABLET ORAL at 08:36

## 2023-11-22 RX ADMIN — Medication 1 APPLICATION: at 08:39

## 2023-11-22 RX ADMIN — FUROSEMIDE 10 MG: 20 TABLET ORAL at 08:36

## 2023-11-22 RX ADMIN — TIOTROPIUM BROMIDE INHALATION SPRAY 2 PUFF: 3.12 SPRAY, METERED RESPIRATORY (INHALATION) at 07:29

## 2023-11-22 RX ADMIN — SERTRALINE 100 MG: 100 TABLET, FILM COATED ORAL at 08:36

## 2023-11-22 NOTE — NURSING NOTE
RN attempted to call report to [facility] staff placed RN on hold to get staff member to take report, this RN waited on hold for 10 mins without anyone taking report. Will try again at a later time.

## 2023-11-22 NOTE — PAYOR COMM NOTE
"Maribell Boyer (83 y.o. Female)     PLEASE SEE ATTACHED FOR DC NOTICE    REF #  [id number]    THANK YOU  SARA SAMUELS RN/ DEPT  [facility]   [phone]  [phone]        Date of Birth   1940    Social Security Number       Address   [facility] [facility] [address] [address]    Home Phone   [phone]    MRN   [id number]       Orthodoxy   Unknown    Marital Status                               Admission Date   11/2/23    Admission Type   Emergency    Admitting Provider   Kenton Meeks MD    Attending Provider       Department, Room/Bed   [facility] 5 [facility], S508/1       Discharge Date   11/22/2023    Discharge Disposition   Home or Self Care    Discharge Destination                                 Attending Provider: (none)   Allergies: Aspirin, Diphenhydramine Hcl, Hydroxyurea, Oxycodone    Isolation: Contact   Infection: ESBL E coli (11/05/23)   Code Status: CPR    Ht: 162 cm (63.78\")   Wt: 62.3 kg (137 lb 5.6 oz)    Admission Cmt: None   Principal Problem: Acute kidney injury [N17.9]                   Active Insurance as of 11/2/2023       Primary Coverage       Payor Plan Insurance Group Employer/Plan Group    AETNA MEDICARE REPLACEMENT AETNA MEDICARE REPLACEMENT [id number]       Payor Plan Address Payor Plan Phone Number Payor Plan Fax Number Effective Dates    [address] [phone]  1/1/2022 - None Entered    [facility] [id number]         Subscriber Name Subscriber Birth Date Member ID       MARIBELL BOYER [id number] 1940 [id number]                     Emergency Contacts        (Rel.) Home Phone Work Phone Mobile Phone    RAY BOYRE (Daughter) [phone] -- [phone]    Karan Boyer (2ndPOA) (Son) -- -- [phone]    Karen Gutierrez (1stPOA) (Daughter) -- -- [phone]    MerlinGreg (Son) -- -- [phone]    MEG BANKS (Brother) -- -- [phone]      "         [place]: [facility] [id number]  Tax ID [id number]     Discharge Summary        Eduard Rees MD at 23 1247                       [facility]  [phone]    DISCHARGE SUMMARY  [facility]    Patient Identification:  Name: Catherine Boyer  Age: 83 y.o.  Sex: female  :  1940  MRN: [id number]  Primary Care Physician: Kristi Moreau APRN    Admit date: 2023  Discharge date and time:      Discharge Diagnoses:  Acute kidney injury    CAD (coronary artery disease)    Paroxysmal atrial fibrillation    Myeloproliferative disorder    Type 2 diabetes mellitus with circulatory disorder, without long-term current use of insulin    Hypertension    Personal history of transient ischemic attack (TIA), 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 deficits    Myelofibrosis    Pulmonary infiltrate    Iatrogenic pneumothorax         Hospital Course:     This is an 83-year-old woman who comes to the hospital for abdominal pain.  CT of the abdomen pelvis obtained yesterday showed colonic diverticulosis without any inflammatory issues as well as a new indeterminant density in the lingula.  And reticulonodular pulmonary opacities in the bilateral lower lobes.     She has ESBL UTI on ertapenem.  She also had an KARLI that is responding to fluids, nephrology is following.    Regarding lung mass, she will undergo bronchoscopy on .    11/10/23  - had bronchoscopy and had only 3 biopsies due to bleeding which has stopped. Had iatrogenic pneumothorax but this was small and is improving. No malignancy on pathology but inadequate sample-pulm wants to repeat chest CT outpatient. Overall doing better and should be ready to go in the next day or two. WE did discuss goals of care but no changes thus far.     Update : HR more elevated today, did give x1 dose of IV metoprolol which led to improvement, but if heart rate remains elevated, she will likely need adjustment to beta blocker  if blood pressure will tolerate. Otherwise, she has history of myeloproliferative disorder, her WBC is elevated which is chronic, but family today wanted her hematologist Dr. Reinoso to be consulted so I placed that order, so follow up their recommendations. I was told palliative is supposed to meet with patient and family tomorrow to discuss goals of care, so follow up that discussion.    11/17  Getting ready for discharge- [facility] working on finding a place that will take her and let her bring her own chemo bc its so expensive.  Edited by: Chandni Luo MD at 11/17/2023 2035    83-year-old woman comes to the hospital with abdominal pain.  CT scan of the abdomen showed colonic diverticulosis without any inflammatory issues.  She also was noted to have an indeterminate density in her lungs in the lingula.  Patient under went a bronchoscopy but only able to get 3 biopsies because of bleeding.  Patient had a small pneumothorax following this.  This is stable.  Pathology shows no evidence of malignancy.  No evidence of interstitial lung disease per my discussion with Dr. Alford.  Patient was maintained on steroids during hospitalization and we will place the patient on a steroid taper on discharge.    The patient is noted to have JAK2 positive myeloproliferative disorder and the patient is followed by Dr. Gerard Tariq cancer and blood disorders in [place].  The patient's white blood cell count has remained elevated from 40-60.  The patient was seen in consultation by Dr. Ann and Dr. Reinoso.  She remains on her Ojjaara 200 mg daily.  She can continue this on discharge.    Patient is noted to have a wound on the left foot bunion area second and third toes.  Wound care recommends iodoform gel to treat wounds.  Applied daily with dressing.  Try to keep the area covered as the patient picks at the wounds.  She wound care evaluation of sacral skin and groin area.  She wishes to continue with current regimen.  Continue with  barrier cream to sacral area 2-3 times a day and as needed.    Patient has a history of atrial fibrillation and we will continue with her metoprolol and Eliquis.  Along with aspirin.    Patient did have some diarrhea during the hospital stay but this is improved.  She was seen by gastroenterology.  If she has more diarrhea then consider fiber supplements.    Follow-up with pulmonary in 8 weeks.    Aortic stenosis chronic diastolic heart failure-stable on Lasix 10 mg    Weekly CBCs with differential and and CMP at facility.    The patient was seen and examined on the day of discharge.    Consults:   Consults       Date and Time Order Name Status Description    11/19/2023  8:32 AM Inpatient Cardiology Consult      11/12/2023 10:52 AM Hematology & Oncology Inpatient Consult Completed     11/5/2023 10:20 PM Inpatient Cardiology Consult Completed     11/2/2023 11:48 PM Inpatient Gastroenterology Consult Completed     11/2/2023  7:56 PM Inpatient Nephrology Consult Completed     11/2/2023  7:56 PM Inpatient Pulmonology Consult      11/2/2023  7:31 PM [facility] (on-call MD unless specified) Details              Results from last 7 days   Lab Units 11/22/23  0456   WBC 10*3/mm3 63.75*   HEMOGLOBIN g/dL 9.9*   HEMATOCRIT % 31.7*   PLATELETS 10*3/mm3 226       Results from last 7 days   Lab Units 11/22/23  0601   SODIUM mmol/L 139   POTASSIUM mmol/L 3.8   CHLORIDE mmol/L 94*   CO2 mmol/L 26.9   BUN mg/dL 25*   CREATININE mg/dL 0.81   GLUCOSE mg/dL 76   CALCIUM mg/dL 9.3       Significant Diagnostic Studies:   WBC   Date Value Ref Range Status   11/22/2023 63.75 (C) 3.40 - 10.80 10*3/mm3 Final     Hemoglobin   Date Value Ref Range Status   11/22/2023 9.9 (L) 12.0 - 15.9 g/dL Final     Hematocrit   Date Value Ref Range Status   11/22/2023 31.7 (L) 34.0 - 46.6 % Final     Platelets   Date Value Ref Range Status   11/22/2023 226 140 - 450 10*3/mm3 Final     Sodium   Date Value Ref Range Status   11/22/2023 139 136 - 145 mmol/L Final  "    Potassium   Date Value Ref Range Status   11/22/2023 3.8 3.5 - 5.2 mmol/L Final     Chloride   Date Value Ref Range Status   11/22/2023 94 (L) 98 - 107 mmol/L Final     CO2   Date Value Ref Range Status   11/22/2023 26.9 22.0 - 29.0 mmol/L Final     BUN   Date Value Ref Range Status   11/22/2023 25 (H) 8 - 23 mg/dL Final     Creatinine   Date Value Ref Range Status   11/22/2023 0.81 0.57 - 1.00 mg/dL Final     Glucose   Date Value Ref Range Status   11/22/2023 76 65 - 99 mg/dL Final     Calcium   Date Value Ref Range Status   11/22/2023 9.3 8.6 - 10.5 mg/dL Final     Phosphorus   Date Value Ref Range Status   11/22/2023 2.8 2.5 - 4.5 mg/dL Final     No results found for: \"AST\", \"ALT\", \"ALKPHOS\"  No results found for: \"APTT\", \"INR\"  No results found for: \"COLORU\", \"CLARITYU\", \"SPECGRAV\", \"PHUR\", \"PROTEINUR\", \"GLUCOSEU\", \"KETONESU\", \"BLOODU\", \"NITRITE\", \"LEUKOCYTESUR\", \"BILIRUBINUR\", \"UROBILINOGEN\", \"RBCUA\", \"WBCUA\", \"BACTERIA\", \"UACOMMENT\"  No results found for: \"TROPONINT\", \"TROPONINI\", \"BNP\"  No components found for: \"HGBA1C;2\"  No components found for: \"TSH;2\"    Imaging Results (All)       Procedure Component Value Units Date/Time    XR Chest 1 View [[id number]] Collected: 11/20/23 1026     Updated: 11/20/23 1030    Narrative:      XR CHEST 1 VW-     HISTORY: Female who is 83 years-old, hypoxemia     TECHNIQUE: Frontal views of the chest     COMPARISON: 11/8/2023     FINDINGS: The heart size is borderline. Aorta is calcified. Mild  prominence of vascular interstitial markings. Previous right  pneumothorax is no longer seen, may be resolved or obscured.  Infiltrative opacities in the right lung appear partially improved,  continued follow-up recommended. Small likely atelectasis or scarring at  the left midlung. No large pleural effusion, or left pneumothorax. No  acute osseous process.       Impression:      Partial improvement.           This report was finalized on 11/20/2023 10:27 AM by Dr. Grover " BRIAN Ritter M.D on Workstation: [id number]       XR Chest 1 View [[id number]] Collected: 11/09/23 0110     Updated: 11/09/23 0116    Narrative:      Portable chest radiograph     HISTORY: Pneumothorax     TECHNIQUE: Single AP portable radiograph of the chest     COMPARISON: Chest radiograph 11/8/2023       Impression:      FINDINGS AND IMPRESSION:  Previously seen right pneumothorax has decreased in size resulting in  approximately 1.6 cm in pleural-parenchymal separation (previously 2.8  cm). There is moderate pulmonary opacification throughout bilateral  lungs, right greater than left, suggestive of multifocal pneumonia  and/or pulmonary edema in the appropriate clinical context and  correlation with patient history is recommended with follow-up chest CT  if clinically indicated. Given the somewhat masslike opacification  overlying the right lung, at least continued attention on follow-up  chest radiograph in 4 to 6 weeks is recommended to ensure appropriate  evolution/resolution and exclude any possibility of neoplasm.     Cardiac silhouette is within normal its for size.     This report was finalized on 11/9/2023 1:13 AM by Dr. Cuba Lieberman M.D  on Workstation: [id number]       CT Guided Chest Tube [[id number]] Collected: 11/08/23 1646     Updated: 11/08/23 1710    Narrative:      LIMITED CT CHEST     HISTORY:  right pneumothorax     COMPARISON: Chest x-ray earlier same day. CT  11/04/2023.     FINDINGS:   After informed written consent was obtained with a witness present, the  patient was placed in supine position. A planning CT scan of the chest  was performed for anticipated chest tube placement.  Only a relatively small right pneumothorax was seen at this time,  potentially decreased from preceding chest x-ray. Images were reviewed  with Dr. Alford who decided that chest tube placement was not warranted  at this time. Compared to the recent CT a peripherally extending  consolidated area in the right upper lobe  was present, consistent with  bronchoscopy procedure related hemorrhage/hematoma. Partially  obstructing opacity in left lower lobe bronchus could represent blood  clot with associated consolidations/atelectasis in the left lung base.  Less pronounced right lower lobe opacities could also be related to  bleeding. Redemonstrated scattered bilateral groundglass opacities not  directly comparable secondary to technique, worsening not excluded. New  trace bilateral pleural effusions.  All elements of maximal sterile technique were followed. Radiation dose  reduction techniques were utilized, including automated exposure control  and exposure modulation based on body size.          Impression:      Small right pneumothorax, no chest tube placed at this time. See above  for all findings.        This report was finalized on 11/8/2023 5:07 PM by Dr. Bruno Haider M.D  on Workstation: [id number]       XR Chest PA & Lateral [[id number]] Collected: 11/08/23 1405     Updated: 11/08/23 1418    Narrative:      XR CHEST PA AND LATERAL-     HISTORY: Female who is 83 years-old, check for pneumothorax status post  bronchoscopy     TECHNIQUE: Frontal and lateral views of the chest     COMPARISON: 9/27/2023     FINDINGS: The heart size is borderline. Aorta is calcified. Pulmonary  vasculature is congested. Extensive patchy opacities in both lungs may  be edema and/or pneumonia. Dense consolidation in the right upper lobe,  about 6.3 cm, may represent postbiopsy hemorrhage. Right apical  pneumothorax measures about 3.1 cm, about 20% right pneumothorax. No  pleural effusion, or left pneumothorax. No acute osseous process.       Impression:      Critical test result. Right pneumothorax, as described.  Dense consolidation in the right upper lobe may represent postbiopsy  hemorrhage. Extensive patchy opacities may be edema and/or pneumonia.     Discussed by telephone with patient's nurse, Ismael, at time of  interpretation, 1408, 11/8/2023      This report was finalized on 11/8/2023 2:15 PM by Dr. Reilly Ritter M.D on Workstation: [id number]       XR Chest 1 View [[id number]] Collected: 11/08/23 1146     Updated: 11/08/23 1150    Narrative:      XR CHEST 1 VW-, FL C ARM DURING SURGERY-     HISTORY: Female who is 83 years-old, bronchoscopy, biopsies        TECHNIQUE: FLUOROSCOPIC ASSISTANCE IN THE OPERATING ROOM.     FINDINGS:     1 intraoperative fluoroscopic spot view was obtained and recorded the  PACS for review, revealing right-sided bronchoscopy. Please see  operative report for full details.     Fluoroscopy time: 36.1 seconds     Radiation exposure: Dose area product: 1.6852 Gy x cm(2)          Impression:         As described.     This report was finalized on 11/8/2023 11:47 AM by Dr. Reilly Ritter M.D on Workstation: [id number]       FL C Arm During Surgery [[id number]] Collected: 11/08/23 1146     Updated: 11/08/23 1150    Narrative:      XR CHEST 1 VW-, FL C ARM DURING SURGERY-     HISTORY: Female who is 83 years-old, bronchoscopy, biopsies        TECHNIQUE: FLUOROSCOPIC ASSISTANCE IN THE OPERATING ROOM.     FINDINGS:     1 intraoperative fluoroscopic spot view was obtained and recorded the  PACS for review, revealing right-sided bronchoscopy. Please see  operative report for full details.     Fluoroscopy time: 36.1 seconds     Radiation exposure: Dose area product: 1.6852 Gy x cm(2)          Impression:         As described.     This report was finalized on 11/8/2023 11:47 AM by Dr. Reilly Ritter M.D on Workstation: [id number]       CT Chest Without Contrast Diagnostic [[id number]] Collected: 11/04/23 0909     Updated: 11/04/23 0920    Narrative:      CT CHEST WO CONTRAST DIAGNOSTIC-     INDICATIONS: Lung mass     TECHNIQUE: Radiation dose reduction techniques were utilized, including  automated exposure control and exposure modulation based on body size.  Unenhanced CHEST CT     COMPARISON: 5/7/2023     FINDINGS:           The heart  size is borderline without pericardial effusion. Prominent  coronary arterial calcifications are apparent. Ascending aorta is  dilated, 3.8 cm, not significantly changed. A few small subcentimeter  short axis mediastinal lymph nodes are seen that are not significant by  size criteria. Assessment of vascular, mediastinal, and hilar structures  is limited without intravenous contrast material. Debris is seen in the  esophagus.     The airways appear clear.     No pleural effusion or pneumothorax.     The lungs show again show a pleural-based density in the lingula, axial  image 58, also present the abdomen/pelvis CT from 11/2/2023 (and appears  similar apart from increased extension medially), but representing a  change from the chest CT from 5/7/2023, may be rounded atelectasis,  pneumonia, or neoplasm. Since the recent abdomen/pelvis CT,  consolidation has developed anteriorly laterally in the left lower lobe  suspicious for pneumonia. Fairly extensive groundglass and consolidative  opacities are present throughout both lungs, especially mid to upper  lungs that may represent edema and/or atypical pneumonia.        Upper abdominal structures show no acute findings. Gallbladder is  surgically absent. 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 Mild  nonspecific thickening of the adrenal glands.     Degenerative changes are seen in the spine, shoulders. No acute fracture  is identified.       Impression:         Redemonstration of pleural-based density in the lingula, showing  increased extension medially, representing a change from the chest CT  from 5/7/2023, may be rounded atelectasis, pneumonia, or neoplasm;  correlate clinically, PET/CT correlation can be obtained as indicated,  in addition to continued CT follow-up. Since the recent abdomen/pelvis  CT, consolidation has developed anteriorly laterally in the left lower  lobe suspicious for pneumonia. Fairly extensive groundglass and  consolidative opacities are  present throughout both lungs, especially  mid to upper lungs that may represent edema and/or atypical pneumonia           This report was finalized on 11/4/2023 9:17 AM by Dr. Reilly Ritter M.D on Workstation: [id number]       CT Abdomen Pelvis Without Contrast [[id number]] Collected: 11/02/23 1912     Updated: 11/02/23 1925    Narrative:      CT ABDOMEN PELVIS WO CONTRAST-     INDICATIONS: Abdominal pain     TECHNIQUE: Radiation dose reduction techniques were utilized, including  automated exposure control and exposure modulation based on body size.  Unenhanced ABDOMEN AND PELVIS CT     COMPARISON: 8/13/2023     FINDINGS:     The gallbladder is surgically absent.     Mild nonspecific nodular thickening of the adrenal glands is seen.     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 A small arterial calcification is  apparent at the left renal hilum. No hydronephrosis.     Otherwise unremarkable unenhanced appearance of the liver, spleen,  adrenal glands, pancreas, kidneys, bladder.     No bowel obstruction or abnormal bowel thickening is identified. Colonic  diverticula are seen that do not appear inflamed. Minimal hiatal hernia  is seen. Pelvic floor relaxation/rectocele is apparent, correlate with  clinical exam.     No free intraperitoneal gas or free fluid. Umbilical hernia of fat is  noted.     Scattered small mesenteric and para-aortic lymph nodes are seen that are  not significant by size criteria.     Abdominal aorta is not aneurysmal. Aortic and other arterial  calcifications are present.     The lung bases show a new pleural-based density in the lingula, 1.3 x  3.3 cm on axial image 8, that could be rounded atelectasis or  potentially a focus of infection or even neoplasm, correlate clinically,  PET/CT correlation can be obtained as indicated, in addition to  continued CT follow-up. Reticulonodular opacities in the lower lobes may  be inflammatory/infectious in etiology, follow-up recommended.    "  Degenerative changes are seen in the spine. Stable sclerotic foci in the  left aspect of the sacrum, right femur, nonspecific, may represent bone  island. No acute fracture is identified.             Impression:            1. A new indeterminate density in the lingula, as described.  Reticulonodular pulmonary opacities in the bilateral lower lobes.  Follow-up evaluation advised.     2. Colonic diverticulosis. No acute inflammatory process of bowel is  identified. Follow-up as indications persist.     3. No urolithiasis or hydronephrosis. Small arterial calcification of  the left renal hilum.     This report was finalized on 11/2/2023 7:22 PM by Dr. Reilly Ritter M.D on Workstation: [id number]           No results found for: \"SITE\", \"ALLENTEST\", \"PHART\", \"EKD6NBP\", \"PO2ART\", \"KSE2LSP\", \"BASEEXCESS\", \"N5YVNJVZ\", \"HGBBG\", \"HCTABG\", \"OXYHEMOGLOBI\", \"METHHGBN\", \"CARBOXYHGB\", \"CO2CT\", \"BAROMETRIC\", \"MODALITY\", \"FIO2\"       Discharge Medications        New Medications        Instructions Start Date   aspirin 81 MG EC tablet   81 mg, Oral, Daily   Start Date: November 23, 2023     bisacodyl 10 MG suppository  Commonly known as: DULCOLAX   10 mg, Rectal, Daily PRN      budesonide-formoterol 160-4.5 MCG/ACT inhaler  Commonly known as: SYMBICORT   2 puffs, Inhalation, 2 Times Daily - RT      cadexomer iodine 0.9 % gel  Commonly known as: IODOSORB   1 application , Topical, Daily   Start Date: November 23, 2023     polyethylene glycol 17 g packet  Commonly known as: MIRALAX   17 g, Oral, Daily PRN      predniSONE 5 MG tablet  Commonly known as: DELTASONE   Take 4 tablets by mouth Daily With Breakfast for 6 days, THEN 3 tablets Daily With Breakfast for 7 days, THEN 2 tablets Daily With Breakfast for 7 days, THEN 1 tablet Daily With Breakfast for 7 days.   Start Date: November 23, 2023     tiotropium bromide monohydrate 2.5 MCG/ACT aerosol solution inhaler  Commonly known as: SPIRIVA RESPIMAT   2 puffs, Inhalation, Daily - " RT   Start Date: November 23, 2023            Changes to Medications        Instructions Start Date   furosemide 20 MG tablet  Commonly known as: LASIX  What changed:   medication strength  how much to take   10 mg, Oral, Daily   Start Date: November 23, 2023            Continue These Medications        Instructions Start Date   acetaminophen 500 MG tablet  Commonly known as: TYLENOL   500 mg, Oral, As Needed      apixaban 5 MG tablet tablet  Commonly known as: ELIQUIS   5 mg, Oral, 2 Times Daily      famotidine 20 MG tablet  Commonly known as: PEPCID   20 mg, Oral, Daily      Gas Relief 80 MG chewable tablet  Generic drug: simethicone   Chew 1 tablet by mouth 4 (Four) Times a Day As Needed for Flatulence.      latanoprost 0.005 % ophthalmic solution  Commonly known as: XALATAN   1 drop, Both Eyes      metoprolol succinate XL 25 MG 24 hr tablet  Commonly known as: TOPROL-XL   25 mg, Oral, Daily      Momelotinib Dihydrochloride 200 MG tablet  Commonly known as: OJJAARA   200 mg, Oral, Daily      saccharomyces boulardii 250 MG capsule  Commonly known as: FLORASTOR   250 mg, Oral, 2 Times Daily      sertraline 100 MG tablet  Commonly known as: ZOLOFT   100 mg, Oral, Daily             Stop These Medications      clopidogrel 75 MG tablet  Commonly known as: PLAVIX     hydrocortisone-bacitracin-zinc oxide-nystatin  Commonly known as: MAGIC BARRIER     loperamide 2 MG capsule  Commonly known as: IMODIUM     mupirocin 2 % ointment  Commonly known as: BACTROBAN     ondansetron 4 MG tablet  Commonly known as: Zofran     sucralfate 1 g tablet  Commonly known as: CARAFATE                Patient Instructions:       Future Appointments   Date Time Provider Department Center   12/4/2023 11:00 AM Estrellita Blanco APRN [facility]   12/5/2023  2:15 PM Annika Guzman MD [facility]   4/18/2024  1:45 PM Albin Barriga MD [facility]         Contact information for follow-up providers       Danisha Griffin APRN  Follow up in 2 month(s).    Specialties: Pulmonary Disease, Sleep Medicine  Why: Follow-up in 7 to 8 weeks with Dr. Manuelito Raman's nurse practitioner, needs CT chest without contrast for pulmonary infiltrates 1 week prior to appointment  Contact information:  [address]  [address]  [facility] [id number] [phone]               Kristi Moreau APRN .    Specialty: Nurse Practitioner  Contact information:  [address] [facility] [address]  [address]  [facility] [id number] [phone]                       Contact information for after-discharge care       Destination       [facility] .    Service: Skilled Nursing  Contact information:  [address] [facility] [address] [phone]                     Home Medical Care       [facility] .    Service: Home Health Services  Contact information:  [address] , [address]  [facility] [address] [phone]                                   Discharge Order (From admission, onward)       Start     Ordered    11/22/23 1234  Discharge patient  Once        Expected Discharge Date: 11/22/23   Discharge Disposition: Home or Self Care   Physician of Record for Attribution - Please select from Treatment Team: JENNY CALIX [[id number]]   Review needed by CMO to determine Physician of Record: No      Question Answer Comment   Physician of Record for Attribution - Please select from Treatment Team JENNY CALIX    Review needed by CMO to determine Physician of Record No        11/22/23 1237                    Diet Order   Procedures    Diet: Cardiac Diets, Diabetic Diets; Healthy Heart (2-3 Na+); Consistent Carbohydrate; Texture: Regular Texture (IDDSI 7); Fluid Consistency: Thin (IDDSI 0)       TEST  RESULTS PENDING AT DISCHARGE  Pending Labs       Order Current Status    Fungitell B-D Glucan In process    AFB Culture - Lavage, Lung, Right Upper Lobe Preliminary result    Fungus Culture - Lavage, Lung, Right Upper  Lobe Preliminary result                  Total time spent discharging patient including evaluation, post hospitalization follow up,  medication and post hospitalization instructions and education, total time exceeds 30 minutes.    Signed:  Eduard Rees MD  11/22/2023  12:52 EST      Electronically signed by Eduard Rees MD at 11/22/23 1305       Discharge Order (From admission, onward)       Start     Ordered    11/22/23 1234  Discharge patient  Once        Expected Discharge Date: 11/22/23   Discharge Disposition: Home or Self Care   Physician of Record for Attribution - Please select from Treatment Team: EDUARD REES [[id number]]   Review needed by CMO to determine Physician of Record: No      Question Answer Comment   Physician of Record for Attribution - Please select from Treatment Team EDUARD REES    Review needed by CMO to determine Physician of Record No        11/22/23 4278

## 2023-11-22 NOTE — PROGRESS NOTES
Discharge Planning Assessment  [facility]     Patient Name: Catherine Boyer  MRN: [id number]  Today's Date: 11/22/2023    Admit Date: 11/2/2023    Plan: Skilled rehab at [facility] nursing   Discharge Needs Assessment    No documentation.                  Discharge Plan       Row Name 11/22/23 1551       Plan    Plan Skilled rehab at [facility] skilled nursing    Final Discharge Disposition Code 03 - skilled nursing facility (SNF)    Final Note [facility] skilled rehab                  Continued Care and Services - Discharged on 11/22/2023 Admission date: 11/2/2023 - Discharge disposition: Home or Self Care      Destination Coordination complete.      Service Provider Request Status Selected Services Address Phone Fax Patient Preferred    [facility]  Selected Skilled Nursing [address] [facility] [id number] [phone] --    [facility] REHABILITATION Considering N/A [address] [facility] [address] [phone] --    [facility] Pending - Request Sent N/A [address] [facility] [address] [phone] --    [facility] Declined  Cost of medications/treatments N/A [address] [facility] [id number] [phone] --    [facility] Declined  Cost of medications/treatments N/A [address] [facility] [id number] [phone] --    [facility] Declined  Bed not available N/A [address] [facility] [address] [phone] --    [facility] Declined  Bed not available N/A [address] [facility] [address] [phone] --    [facility] Declined  Bed not available N/A [address] [facility] [address] [phone] --    [facility]  Patient Insurance Not Accepted N/A [address] [facility] [address]  [phone] --              Home Medical Care       Service Provider Request Status Selected Services Address Phone Fax Patient Preferred    [facility]  Selected Home Health Services [address], [address], [facility] [address] [phone] --                  Selected Continued Care - Episodes Includes continued care and service providers with selected services from the active episodes listed below      Oncology- External Fill Episode start date: 10/10/2023   There are no active outsourced providers for this episode.                 Selected Continued Care - Prior Encounters Includes continued care and service providers with selected services from prior encounters from 8/4/2023 to 11/22/2023      Discharged on 10/2/2023 Admission date: 9/27/2023 - Discharge disposition: Home-Health Care c      Home Medical Care       Service Provider Selected Services Address Phone Fax Patient Preferred    [facility] [address], [address], [facility] [address] [phone] --                          Expected Discharge Date and Time       Expected Discharge Date Expected Discharge Time    Nov 22, 2023            Demographic Summary    No documentation.                  Functional Status    No documentation.                  Psychosocial    No documentation.                  Abuse/Neglect    No documentation.                  Legal    No documentation.                  Substance Abuse    No documentation.                  Patient Forms    No documentation.                     Neema Parsons RN

## 2023-11-22 NOTE — TELEPHONE ENCOUNTER
Caller: BYRON    Relationship to patient:  [facility]     Best call back number:     PATIENTS NUMBERS     [phone] HOME OR CELL [phone]    Chief complaint: NEEDING TO MAKE 4 WEEK HOSPITAL FOLLOW UP WITH DR [facility]     Type of visit: HOSPITAL FOLLOW UP EST PT    Requested date: 4 WEEKS FROM TODAY 11/22        Additional notes:HUB UNABLE TO SCHEDULE IN TIME FRAME PLEASE CALL PATIENT WITH APPOINTMENT.

## 2023-11-22 NOTE — DISCHARGE SUMMARY
[facility]  [phone]    DISCHARGE SUMMARY  [facility]    Patient Identification:  Name: Catherine Boyer  Age: 83 y.o.  Sex: female  :  1940  MRN: [id number]  Primary Care Physician: Kristi Moreau APRN    Admit date: 2023  Discharge date and time:      Discharge Diagnoses:  Acute kidney injury    CAD (coronary artery disease)    Paroxysmal atrial fibrillation    Myeloproliferative disorder    Type 2 diabetes mellitus with circulatory disorder, without long-term current use of insulin    Hypertension    Personal history of transient ischemic attack (TIA), 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 deficits    Myelofibrosis    Pulmonary infiltrate    Iatrogenic pneumothorax         Hospital Course:     This is an 83-year-old woman who comes to the hospital for abdominal pain.  CT of the abdomen pelvis obtained yesterday showed colonic diverticulosis without any inflammatory issues as well as a new indeterminant density in the lingula.  And reticulonodular pulmonary opacities in the bilateral lower lobes.     She has ESBL UTI on ertapenem.  She also had an KARLI that is responding to fluids, nephrology is following.    Regarding lung mass, she will undergo bronchoscopy on .    11/10/23  - had bronchoscopy and had only 3 biopsies due to bleeding which has stopped. Had iatrogenic pneumothorax but this was small and is improving. No malignancy on pathology but inadequate sample-pulm wants to repeat chest CT outpatient. Overall doing better and should be ready to go in the next day or two. WE did discuss goals of care but no changes thus far.     Update : HR more elevated today, did give x1 dose of IV metoprolol which led to improvement, but if heart rate remains elevated, she will likely need adjustment to beta blocker if blood pressure will tolerate. Otherwise, she has history of myeloproliferative disorder, her WBC is elevated which is chronic,  but family today wanted her hematologist Dr. Reinoso to be consulted so I placed that order, so follow up their recommendations. I was told palliative is supposed to meet with patient and family tomorrow to discuss goals of care, so follow up that discussion.    11/17  Getting ready for discharge- [facility] working on finding a place that will take her and let her bring her own chemo bc its so expensive.  Edited by: Chandni Luo MD at 11/17/2023 2035    83-year-old woman comes to the hospital with abdominal pain.  CT scan of the abdomen showed colonic diverticulosis without any inflammatory issues.  She also was noted to have an indeterminate density in her lungs in the lingula.  Patient under went a bronchoscopy but only able to get 3 biopsies because of bleeding.  Patient had a small pneumothorax following this.  This is stable.  Pathology shows no evidence of malignancy.  No evidence of interstitial lung disease per my discussion with Dr. Alford.  Patient was maintained on steroids during hospitalization and we will place the patient on a steroid taper on discharge.    The patient is noted to have JAK2 positive myeloproliferative disorder and the patient is followed by Dr. Gerard Tariq cancer and blood disorders in [place].  The patient's white blood cell count has remained elevated from 40-60.  The patient was seen in consultation by Dr. Ann and Dr. Reinoso.  She remains on her Ojjaara 200 mg daily.  She can continue this on discharge.    Patient is noted to have a wound on the left foot bunion area second and third toes.  Wound care recommends iodoform gel to treat wounds.  Applied daily with dressing.  Try to keep the area covered as the patient picks at the wounds.  She wound care evaluation of sacral skin and groin area.  She wishes to continue with current regimen.  Continue with barrier cream to sacral area 2-3 times a day and as needed.    Patient has a history of atrial fibrillation and we will continue  with her metoprolol and Eliquis.  Along with aspirin.    Patient did have some diarrhea during the hospital stay but this is improved.  She was seen by gastroenterology.  If she has more diarrhea then consider fiber supplements.    Follow-up with pulmonary in 8 weeks.    Aortic stenosis chronic diastolic heart failure-stable on Lasix 10 mg    Weekly CBCs with differential and and CMP at facility.    The patient was seen and examined on the day of discharge.    Consults:   Consults       Date and Time Order Name Status Description    11/19/2023  8:32 AM Inpatient Cardiology Consult      11/12/2023 10:52 AM Hematology & Oncology Inpatient Consult Completed     11/5/2023 10:20 PM Inpatient Cardiology Consult Completed     11/2/2023 11:48 PM Inpatient Gastroenterology Consult Completed     11/2/2023  7:56 PM Inpatient Nephrology Consult Completed     11/2/2023  7:56 PM Inpatient Pulmonology Consult      11/2/2023  7:31 PM [facility] (on-call MD unless specified) Details              Results from last 7 days   Lab Units 11/22/23  0456   WBC 10*3/mm3 63.75*   HEMOGLOBIN g/dL 9.9*   HEMATOCRIT % 31.7*   PLATELETS 10*3/mm3 226       Results from last 7 days   Lab Units 11/22/23  0601   SODIUM mmol/L 139   POTASSIUM mmol/L 3.8   CHLORIDE mmol/L 94*   CO2 mmol/L 26.9   BUN mg/dL 25*   CREATININE mg/dL 0.81   GLUCOSE mg/dL 76   CALCIUM mg/dL 9.3       Significant Diagnostic Studies:   WBC   Date Value Ref Range Status   11/22/2023 63.75 (C) 3.40 - 10.80 10*3/mm3 Final     Hemoglobin   Date Value Ref Range Status   11/22/2023 9.9 (L) 12.0 - 15.9 g/dL Final     Hematocrit   Date Value Ref Range Status   11/22/2023 31.7 (L) 34.0 - 46.6 % Final     Platelets   Date Value Ref Range Status   11/22/2023 226 140 - 450 10*3/mm3 Final     Sodium   Date Value Ref Range Status   11/22/2023 139 136 - 145 mmol/L Final     Potassium   Date Value Ref Range Status   11/22/2023 3.8 3.5 - 5.2 mmol/L Final     Chloride   Date Value Ref Range Status  "  11/22/2023 94 (L) 98 - 107 mmol/L Final     CO2   Date Value Ref Range Status   11/22/2023 26.9 22.0 - 29.0 mmol/L Final     BUN   Date Value Ref Range Status   11/22/2023 25 (H) 8 - 23 mg/dL Final     Creatinine   Date Value Ref Range Status   11/22/2023 0.81 0.57 - 1.00 mg/dL Final     Glucose   Date Value Ref Range Status   11/22/2023 76 65 - 99 mg/dL Final     Calcium   Date Value Ref Range Status   11/22/2023 9.3 8.6 - 10.5 mg/dL Final     Phosphorus   Date Value Ref Range Status   11/22/2023 2.8 2.5 - 4.5 mg/dL Final     No results found for: \"AST\", \"ALT\", \"ALKPHOS\"  No results found for: \"APTT\", \"INR\"  No results found for: \"COLORU\", \"CLARITYU\", \"SPECGRAV\", \"PHUR\", \"PROTEINUR\", \"GLUCOSEU\", \"KETONESU\", \"BLOODU\", \"NITRITE\", \"LEUKOCYTESUR\", \"BILIRUBINUR\", \"UROBILINOGEN\", \"RBCUA\", \"WBCUA\", \"BACTERIA\", \"UACOMMENT\"  No results found for: \"TROPONINT\", \"TROPONINI\", \"BNP\"  No components found for: \"HGBA1C;2\"  No components found for: \"TSH;2\"    Imaging Results (All)       Procedure Component Value Units Date/Time    XR Chest 1 View [[id number]] Collected: 11/20/23 1026     Updated: 11/20/23 1030    Narrative:      XR CHEST 1 VW-     HISTORY: Female who is 83 years-old, hypoxemia     TECHNIQUE: Frontal views of the chest     COMPARISON: 11/8/2023     FINDINGS: The heart size is borderline. Aorta is calcified. Mild  prominence of vascular interstitial markings. Previous right  pneumothorax is no longer seen, may be resolved or obscured.  Infiltrative opacities in the right lung appear partially improved,  continued follow-up recommended. Small likely atelectasis or scarring at  the left midlung. No large pleural effusion, or left pneumothorax. No  acute osseous process.       Impression:      Partial improvement.           This report was finalized on 11/20/2023 10:27 AM by Dr. Reilly Ritter M.D on Workstation: [id number]       XR Chest 1 View [[id number]] Collected: 11/09/23 0110     Updated: 11/09/23 0116    " Narrative:      Portable chest radiograph     HISTORY: Pneumothorax     TECHNIQUE: Single AP portable radiograph of the chest     COMPARISON: Chest radiograph 11/8/2023       Impression:      FINDINGS AND IMPRESSION:  Previously seen right pneumothorax has decreased in size resulting in  approximately 1.6 cm in pleural-parenchymal separation (previously 2.8  cm). There is moderate pulmonary opacification throughout bilateral  lungs, right greater than left, suggestive of multifocal pneumonia  and/or pulmonary edema in the appropriate clinical context and  correlation with patient history is recommended with follow-up chest CT  if clinically indicated. Given the somewhat masslike opacification  overlying the right lung, at least continued attention on follow-up  chest radiograph in 4 to 6 weeks is recommended to ensure appropriate  evolution/resolution and exclude any possibility of neoplasm.     Cardiac silhouette is within normal its for size.     This report was finalized on 11/9/2023 1:13 AM by Dr. Cuba Lieberman M.D  on Workstation: [id number]       CT Guided Chest Tube [[id number]] Collected: 11/08/23 1646     Updated: 11/08/23 1710    Narrative:      LIMITED CT CHEST     HISTORY:  right pneumothorax     COMPARISON: Chest x-ray earlier same day. CT  11/04/2023.     FINDINGS:   After informed written consent was obtained with a witness present, the  patient was placed in supine position. A planning CT scan of the chest  was performed for anticipated chest tube placement.  Only a relatively small right pneumothorax was seen at this time,  potentially decreased from preceding chest x-ray. Images were reviewed  with Dr. Alford who decided that chest tube placement was not warranted  at this time. Compared to the recent CT a peripherally extending  consolidated area in the right upper lobe was present, consistent with  bronchoscopy procedure related hemorrhage/hematoma. Partially  obstructing opacity in left lower lobe  bronchus could represent blood  clot with associated consolidations/atelectasis in the left lung base.  Less pronounced right lower lobe opacities could also be related to  bleeding. Redemonstrated scattered bilateral groundglass opacities not  directly comparable secondary to technique, worsening not excluded. New  trace bilateral pleural effusions.  All elements of maximal sterile technique were followed. Radiation dose  reduction techniques were utilized, including automated exposure control  and exposure modulation based on body size.          Impression:      Small right pneumothorax, no chest tube placed at this time. See above  for all findings.        This report was finalized on 11/8/2023 5:07 PM by Dr. Bruno Haider M.D  on Workstation: [id number]       XR Chest PA & Lateral [[id number]] Collected: 11/08/23 1405     Updated: 11/08/23 1418    Narrative:      XR CHEST PA AND LATERAL-     HISTORY: Female who is 83 years-old, check for pneumothorax status post  bronchoscopy     TECHNIQUE: Frontal and lateral views of the chest     COMPARISON: 9/27/2023     FINDINGS: The heart size is borderline. Aorta is calcified. Pulmonary  vasculature is congested. Extensive patchy opacities in both lungs may  be edema and/or pneumonia. Dense consolidation in the right upper lobe,  about 6.3 cm, may represent postbiopsy hemorrhage. Right apical  pneumothorax measures about 3.1 cm, about 20% right pneumothorax. No  pleural effusion, or left pneumothorax. No acute osseous process.       Impression:      Critical test result. Right pneumothorax, as described.  Dense consolidation in the right upper lobe may represent postbiopsy  hemorrhage. Extensive patchy opacities may be edema and/or pneumonia.     Discussed by telephone with patient's nurse, Ismael, at time of  interpretation, 1408, 11/8/2023     This report was finalized on 11/8/2023 2:15 PM by Dr. Reilly Ritter M.D on Workstation: [id number]       XR Chest 1 View  [[id number]] Collected: 11/08/23 1146     Updated: 11/08/23 1150    Narrative:      XR CHEST 1 VW-, FL C ARM DURING SURGERY-     HISTORY: Female who is 83 years-old, bronchoscopy, biopsies        TECHNIQUE: FLUOROSCOPIC ASSISTANCE IN THE OPERATING ROOM.     FINDINGS:     1 intraoperative fluoroscopic spot view was obtained and recorded the  PACS for review, revealing right-sided bronchoscopy. Please see  operative report for full details.     Fluoroscopy time: 36.1 seconds     Radiation exposure: Dose area product: 1.6852 Gy x cm(2)          Impression:         As described.     This report was finalized on 11/8/2023 11:47 AM by Dr. Reilly Ritter M.D on Workstation: [id number]       FL C Arm During Surgery [[id number]] Collected: 11/08/23 1146     Updated: 11/08/23 1150    Narrative:      XR CHEST 1 VW-, FL C ARM DURING SURGERY-     HISTORY: Female who is 83 years-old, bronchoscopy, biopsies        TECHNIQUE: FLUOROSCOPIC ASSISTANCE IN THE OPERATING ROOM.     FINDINGS:     1 intraoperative fluoroscopic spot view was obtained and recorded the  PACS for review, revealing right-sided bronchoscopy. Please see  operative report for full details.     Fluoroscopy time: 36.1 seconds     Radiation exposure: Dose area product: 1.6852 Gy x cm(2)          Impression:         As described.     This report was finalized on 11/8/2023 11:47 AM by Dr. Reilly Ritter M.D on Workstation: [id number]       CT Chest Without Contrast Diagnostic [[id number]] Collected: 11/04/23 0909     Updated: 11/04/23 0920    Narrative:      CT CHEST WO CONTRAST DIAGNOSTIC-     INDICATIONS: Lung mass     TECHNIQUE: Radiation dose reduction techniques were utilized, including  automated exposure control and exposure modulation based on body size.  Unenhanced CHEST CT     COMPARISON: 5/7/2023     FINDINGS:           The heart size is borderline without pericardial effusion. Prominent  coronary arterial calcifications are apparent. Ascending aorta  is  dilated, 3.8 cm, not significantly changed. A few small subcentimeter  short axis mediastinal lymph nodes are seen that are not significant by  size criteria. Assessment of vascular, mediastinal, and hilar structures  is limited without intravenous contrast material. Debris is seen in the  esophagus.     The airways appear clear.     No pleural effusion or pneumothorax.     The lungs show again show a pleural-based density in the lingula, axial  image 58, also present the abdomen/pelvis CT from 11/2/2023 (and appears  similar apart from increased extension medially), but representing a  change from the chest CT from 5/7/2023, may be rounded atelectasis,  pneumonia, or neoplasm. Since the recent abdomen/pelvis CT,  consolidation has developed anteriorly laterally in the left lower lobe  suspicious for pneumonia. Fairly extensive groundglass and consolidative  opacities are present throughout both lungs, especially mid to upper  lungs that may represent edema and/or atypical pneumonia.        Upper abdominal structures show no acute findings. Gallbladder is  surgically absent. 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 Mild  nonspecific thickening of the adrenal glands.     Degenerative changes are seen in the spine, shoulders. No acute fracture  is identified.       Impression:         Redemonstration of pleural-based density in the lingula, showing  increased extension medially, representing a change from the chest CT  from 5/7/2023, may be rounded atelectasis, pneumonia, or neoplasm;  correlate clinically, PET/CT correlation can be obtained as indicated,  in addition to continued CT follow-up. Since the recent abdomen/pelvis  CT, consolidation has developed anteriorly laterally in the left lower  lobe suspicious for pneumonia. Fairly extensive groundglass and  consolidative opacities are present throughout both lungs, especially  mid to upper lungs that may represent edema and/or atypical pneumonia           This  report was finalized on 11/4/2023 9:17 AM by Dr. Reilly Ritter M.D on Workstation: [id number]       CT Abdomen Pelvis Without Contrast [[id number]] Collected: 11/02/23 1912     Updated: 11/02/23 1925    Narrative:      CT ABDOMEN PELVIS WO CONTRAST-     INDICATIONS: Abdominal pain     TECHNIQUE: Radiation dose reduction techniques were utilized, including  automated exposure control and exposure modulation based on body size.  Unenhanced ABDOMEN AND PELVIS CT     COMPARISON: 8/13/2023     FINDINGS:     The gallbladder is surgically absent.     Mild nonspecific nodular thickening of the adrenal glands is seen.     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 A small arterial calcification is  apparent at the left renal hilum. No hydronephrosis.     Otherwise unremarkable unenhanced appearance of the liver, spleen,  adrenal glands, pancreas, kidneys, bladder.     No bowel obstruction or abnormal bowel thickening is identified. Colonic  diverticula are seen that do not appear inflamed. Minimal hiatal hernia  is seen. Pelvic floor relaxation/rectocele is apparent, correlate with  clinical exam.     No free intraperitoneal gas or free fluid. Umbilical hernia of fat is  noted.     Scattered small mesenteric and para-aortic lymph nodes are seen that are  not significant by size criteria.     Abdominal aorta is not aneurysmal. Aortic and other arterial  calcifications are present.     The lung bases show a new pleural-based density in the lingula, 1.3 x  3.3 cm on axial image 8, that could be rounded atelectasis or  potentially a focus of infection or even neoplasm, correlate clinically,  PET/CT correlation can be obtained as indicated, in addition to  continued CT follow-up. Reticulonodular opacities in the lower lobes may  be inflammatory/infectious in etiology, follow-up recommended.     Degenerative changes are seen in the spine. Stable sclerotic foci in the  left aspect of the sacrum, right femur, nonspecific, may  "represent bone  island. No acute fracture is identified.             Impression:            1. A new indeterminate density in the lingula, as described.  Reticulonodular pulmonary opacities in the bilateral lower lobes.  Follow-up evaluation advised.     2. Colonic diverticulosis. No acute inflammatory process of bowel is  identified. Follow-up as indications persist.     3. No urolithiasis or hydronephrosis. Small arterial calcification of  the left renal hilum.     This report was finalized on 11/2/2023 7:22 PM by Dr. Reilly Ritter M.D on Workstation: [id number]           No results found for: \"SITE\", \"ALLENTEST\", \"PHART\", \"CIA2QIN\", \"PO2ART\", \"SCD3OSR\", \"BASEEXCESS\", \"C5BSGLGH\", \"HGBBG\", \"HCTABG\", \"OXYHEMOGLOBI\", \"METHHGBN\", \"CARBOXYHGB\", \"CO2CT\", \"BAROMETRIC\", \"MODALITY\", \"FIO2\"       Discharge Medications        New Medications        Instructions Start Date   aspirin 81 MG EC tablet   81 mg, Oral, Daily   Start Date: November 23, 2023     bisacodyl 10 MG suppository  Commonly known as: DULCOLAX   10 mg, Rectal, Daily PRN      budesonide-formoterol 160-4.5 MCG/ACT inhaler  Commonly known as: SYMBICORT   2 puffs, Inhalation, 2 Times Daily - RT      cadexomer iodine 0.9 % gel  Commonly known as: IODOSORB   1 application , Topical, Daily   Start Date: November 23, 2023     polyethylene glycol 17 g packet  Commonly known as: MIRALAX   17 g, Oral, Daily PRN      predniSONE 5 MG tablet  Commonly known as: DELTASONE   Take 4 tablets by mouth Daily With Breakfast for 6 days, THEN 3 tablets Daily With Breakfast for 7 days, THEN 2 tablets Daily With Breakfast for 7 days, THEN 1 tablet Daily With Breakfast for 7 days.   Start Date: November 23, 2023     tiotropium bromide monohydrate 2.5 MCG/ACT aerosol solution inhaler  Commonly known as: SPIRIVA RESPIMAT   2 puffs, Inhalation, Daily - RT   Start Date: November 23, 2023            Changes to Medications        Instructions Start Date   furosemide 20 MG " tablet  Commonly known as: LASIX  What changed:   medication strength  how much to take   10 mg, Oral, Daily   Start Date: November 23, 2023            Continue These Medications        Instructions Start Date   acetaminophen 500 MG tablet  Commonly known as: TYLENOL   500 mg, Oral, As Needed      apixaban 5 MG tablet tablet  Commonly known as: ELIQUIS   5 mg, Oral, 2 Times Daily      famotidine 20 MG tablet  Commonly known as: PEPCID   20 mg, Oral, Daily      Gas Relief 80 MG chewable tablet  Generic drug: simethicone   Chew 1 tablet by mouth 4 (Four) Times a Day As Needed for Flatulence.      latanoprost 0.005 % ophthalmic solution  Commonly known as: XALATAN   1 drop, Both Eyes      metoprolol succinate XL 25 MG 24 hr tablet  Commonly known as: TOPROL-XL   25 mg, Oral, Daily      Momelotinib Dihydrochloride 200 MG tablet  Commonly known as: OJJAARA   200 mg, Oral, Daily      saccharomyces boulardii 250 MG capsule  Commonly known as: FLORASTOR   250 mg, Oral, 2 Times Daily      sertraline 100 MG tablet  Commonly known as: ZOLOFT   100 mg, Oral, Daily             Stop These Medications      clopidogrel 75 MG tablet  Commonly known as: PLAVIX     hydrocortisone-bacitracin-zinc oxide-nystatin  Commonly known as: MAGIC BARRIER     loperamide 2 MG capsule  Commonly known as: IMODIUM     mupirocin 2 % ointment  Commonly known as: BACTROBAN     ondansetron 4 MG tablet  Commonly known as: Zofran     sucralfate 1 g tablet  Commonly known as: CARAFATE                Patient Instructions:       Future Appointments   Date Time Provider Department Center   12/4/2023 11:00 AM Estrellita Blanco APRN [facility]   12/5/2023  2:15 PM Annika Guzman MD [facility]   4/18/2024  1:45 PM Albin Barriga MD [facility]         Contact information for follow-up providers       Danisha Griffin APRN Follow up in 2 month(s).    Specialties: Pulmonary Disease, Sleep Medicine  Why: Follow-up in 7 to 8 weeks with Dr. Danisha  Manuelito's nurse practitioner, needs CT chest without contrast for pulmonary infiltrates 1 week prior to appointment  Contact information:  [address]  [address]  [facility] [id number] [phone]               Kristi Moreau APRN .    Specialty: Nurse Practitioner  Contact information:  [address]  [address]  [facility] [id number] [phone]                       Contact information for after-discharge care       Destination       [facility] .    Service: Skilled Nursing  Contact information:  [address] [facility] [address] [phone]                     Home Medical Care       [address] .    Service: Home Health Services  Contact information:  [address] , [address]  [facility] [address] [phone]                                   Discharge Order (From admission, onward)       Start     Ordered    11/22/23 1234  Discharge patient  Once        Expected Discharge Date: 11/22/23   Discharge Disposition: Home or Self Care   Physician of Record for Attribution - Please select from Treatment Team: JENNY CALIX [[id number]]   Review needed by CMO to determine Physician of Record: No      Question Answer Comment   Physician of Record for Attribution - Please select from Treatment Team JENNY CALIX    Review needed by CMO to determine Physician of Record No        11/22/23 1237                    Diet Order   Procedures    Diet: Cardiac Diets, Diabetic Diets; Healthy Heart (2-3 Na+); Consistent Carbohydrate; Texture: Regular Texture (IDDSI 7); Fluid Consistency: Thin (IDDSI 0)       TEST  RESULTS PENDING AT DISCHARGE  Pending Labs       Order Current Status    Fungitell B-D Glucan In process    AFB Culture - Lavage, Lung, Right Upper Lobe Preliminary result    Fungus Culture - Lavage, Lung, Right Upper Lobe Preliminary result                  Total time spent discharging patient including evaluation, post hospitalization  follow up,  medication and post hospitalization instructions and education, total time exceeds 30 minutes.    Signed:  Eduard Rees MD  11/22/2023  12:52 EST

## 2023-11-22 NOTE — PROGRESS NOTES
Discharge Planning Assessment  [facility]     Patient Name: Catherine Boyer  MRN: [id number]  Today's Date: 11/22/2023    Admit Date: 11/2/2023    Plan: [facility] can accept today, transporation arranged for 1PM today with [facility]   Discharge Needs Assessment    No documentation.                  Discharge Plan       Row Name 11/22/23 0939       Plan    Plan [facility] can accept today, transporation arranged for 1PM today with [facility]    Plan Comments [facility] has approved for skilled rehab. [facility] has a bed today. Call placed to family spoke joelle Karan he is agreeable with this discharge plan. Transportation arranged for 1PM today with [facility]. Hansel ZENG                  Continued Care and Services - Admitted Since 11/2/2023       Destination Coordination complete.      Service Provider Request Status Selected Services Address Phone Fax Patient Preferred    [facility]  Selected Skilled Nursing [address] [facility] [id number] [phone] --    [facility]ABIL[facility]ATION Considering N/A [address] [facility] [address] [phone] --    [facility] Pending - Request Sent N/A [address] [facility] [address] [phone] --    [facility] Declined  Cost of medications/treatments N/A [address] [facility] [address] [phone] --    [facility] Declined  Cost of medications/treatments N/A [address] [facility] [phone] --    [facility] Declined  Bed not available N/A [address] [facility] [id number] [phone] --    [facility] Declined  Bed not available N/A [address] [facility] [address] [phone] --    [facility] Declined  Bed not available N/A [address] [facility] [address]  [phone] --    [facility] Declined  Patient Insurance Not Accepted N/A [address] [facility] [facility] [id number] [phone] --              Home Medical Care       Service Provider Request Status Selected Services Address Phone Fax Patient Preferred    [facility] [address], [address], [facility] [address] [phone] --                  Selected Continued Care - Episodes Includes continued care and service providers with selected services from the active episodes listed below      Oncology- External Fill Episode start date: 10/10/2023   There are no active outsourced providers for this episode.                 Selected Continued Care - Prior Encounters Includes continued care and service providers with selected services from prior encounters from 8/4/2023 to 11/22/2023      Discharged on 10/2/2023 Admission date: 9/27/2023 - Discharge disposition: [facility] [place] [facility]      Home Medical Care       Service Provider Selected Services Address Phone Fax Patient Preferred    [facility] [address], [address], [facility] [place] [address] [phone] --                          Expected Discharge Date and Time       Expected Discharge Date Expected Discharge Time    Nov 22, 2023            Demographic Summary    No documentation.                  Functional Status    No documentation.                  Psychosocial    No documentation.                  Abuse/Neglect    No documentation.                  Legal    No documentation.                  Substance Abuse    No documentation.                  Patient Forms    No documentation.                     Neema Parsons RN

## 2023-11-22 NOTE — OUTREACH NOTE
Prep Survey      Flowsheet Row Responses   [facility]? [facility]   Is LACE score < 7 ? No   Eligibility Not Eligible   What are the reasons patient is not eligible? [facility]  [[facility]]   Does the patient have one of the following disease processes/diagnoses(primary or secondary)? Other   Prep survey completed? Yes            Christina ADEN - Registered Nurse

## 2023-11-23 NOTE — Clinical Note
Subjective:      Aan Kohli is a 1 y.o. female here with mother. Patient brought in for Otalgia (Fever Saturday (101 F) )      History of Present Illness:  HPI    Seen on 9/12/22 for LOM.  Completed Amoxil.  Here for follow up.  Did have fever over weekend, 101.  Given tylenol.  No fever today.      Review of Systems   Constitutional:  Positive for fever. Negative for activity change and appetite change.   HENT:  Negative for ear pain.    Respiratory:  Positive for cough.    Gastrointestinal:  Negative for diarrhea and vomiting.     Objective:     Physical Exam  Constitutional:       General: She is not in acute distress.     Appearance: She is not ill-appearing.   HENT:      Right Ear: Tympanic membrane normal.      Left Ear: A middle ear effusion is present. Tympanic membrane is erythematous.      Nose: Nose normal.      Mouth/Throat:      Mouth: Mucous membranes are moist.      Pharynx: Oropharynx is clear. No oropharyngeal exudate or posterior oropharyngeal erythema.   Eyes:      Conjunctiva/sclera: Conjunctivae normal.   Cardiovascular:      Rate and Rhythm: Normal rate and regular rhythm.      Heart sounds: No murmur heard.  Pulmonary:      Effort: Pulmonary effort is normal.      Breath sounds: Normal breath sounds. No wheezing or rhonchi.   Musculoskeletal:      Cervical back: Neck supple.   Lymphadenopathy:      Cervical: No cervical adenopathy.   Skin:     General: Skin is warm.      Coloration: Skin is not pale.      Findings: No rash.   Neurological:      Mental Status: She is alert.   Psychiatric:         Behavior: Behavior is cooperative.       Assessment:        1. Fever, unspecified fever cause    2. Acute suppurative otitis media of left ear without spontaneous rupture of tympanic membrane, recurrence not specified         Plan:       Ana was seen today for otalgia.    Diagnoses and all orders for this visit:    Fever, unspecified fever cause    Acute suppurative otitis media of left ear  removed. without spontaneous rupture of tympanic membrane, recurrence not specified  -     cefdinir (OMNICEF) 250 mg/5 mL suspension; Take 3.5 mLs (175 mg total) by mouth once daily. for 10 days    PCP to recheck in 10 days, sooner if problems.

## 2023-11-23 NOTE — Clinical Note
Hemostasis started on the right radial artery. R-Band was used in achieving hemostasis. Radial compression device applied to vessel. Hemostasis achieved successfully. Closure device additional comment: 18cc Vasc Band

## 2023-11-24 PROBLEM — J18.9 CAP (COMMUNITY ACQUIRED PNEUMONIA): Status: ACTIVE | Noted: 2023-11-24

## 2023-11-24 NOTE — PLAN OF CARE
Goal Outcome Evaluation:  Plan of Care Reviewed With: patient      Outcome Evaluation: Patient A/O x4. Pleasant and  cooperative. Requiring 1L O2 NC to maintain sats>90%.C/o generalized weakness. Zofran given x1 for nausea. Emesis noted x1 this shift. Patient instructed to maintain HOB elevated after drinking any fluids to avoid nausea/vomiting. IV antibiotics given. VSS. Safety maintained.

## 2023-11-24 NOTE — DISCHARGE PLACEMENT REQUEST
"Maribell Boyer (83 y.o. Female)       Date of Birth   1940    Social Security Number       Address   [address] [address]    Home Phone   [phone]    MRN   [id number]       Yazidism   Unknown    Marital Status                               Admission Date   11/2/23    Admission Type   Emergency    Admitting Provider   Kenton Meeks MD    Attending Provider       Department, Room/Bed   [address], S[address]/1       Discharge Date   11/22/2023    Discharge Disposition   Home or Self Care    Discharge Destination                                 Attending Provider: (none)   Allergies: Aspirin, Diphenhydramine Hcl, Hydroxyurea, Oxycodone    Isolation: None   Infection: ESBL E coli (11/05/23), COVID (rule out) (11/24/23)   Code Status: CPR    Ht: 162 cm (63.78\")   Wt: 62.3 kg (137 lb 5.6 oz)    Admission Cmt: None   Principal Problem: Acute kidney injury [N17.9]                   Active Insurance as of 11/2/2023       Primary Coverage       Payor Plan Insurance Group Employer/Plan Group    AETNA MEDICARE REPLACEMENT AETNA MEDICARE REPLACEMENT [id number]       Payor Plan Address Payor Plan Phone Number Payor Plan Fax Number Effective Dates    [address] [phone]  1/1/2022 - None Entered    [facility] [id number]         Subscriber Name Subscriber Birth Date Member ID       MARIBELL BOYER 1940 [id number]                     Emergency Contacts        (Rel.) Home Phone Work Phone Mobile Phone    Karen Gutierrez (1stPOA) (Daughter) -- -- [phone]    Karan Boyer (2ndPOA) (Son) -- -- [phone]    RAY BOYER (Daughter) [phone] -- [phone]    Greg Boyer (Son) -- -- [phone]    MEG BANKS (Brother) -- -- [phone]                "

## 2023-11-24 NOTE — ED NOTES
Nursing report ED to floor  Catherine Boyer  83 y.o.  female    HPI :   Chief Complaint   Patient presents with    Vomiting    Nausea    Abdominal Pain       Admitting doctor:   Jl Parkinson MD    Admitting diagnosis:   The primary encounter diagnosis was Community acquired pneumonia, unspecified laterality. Diagnoses of Sepsis without acute organ dysfunction, due to unspecified organism and Hypoxia were also pertinent to this visit.    Code status:   Current Code Status       Date Active Code Status Order ID Comments User Context       11/24/2023 0228 CPR (Attempt to Resuscitate) [id number]  Dexter Reynolds APRN ED        Question Answer    Code Status (Patient has no pulse and is not breathing) CPR (Attempt to Resuscitate)    Medical Interventions (Patient has pulse or is breathing) Full Support                    Allergies:   Aspirin, Diphenhydramine hcl, Hydroxyurea, and Oxycodone    Intake and Output  No intake or output data in the 24 hours ending 11/24/23 0316    Weight:       11/23/23  2312   Weight: 63.5 kg (140 lb)       Most recent vitals:   Vitals:    11/24/23 0201 11/24/23 0203 11/24/23 0221 11/24/23 0251   BP: 122/64   104/53   Pulse:  86 80    Resp:       Temp:       SpO2:  94% 93%    Weight:       Height:           Active LDAs/IV Access:   Lines, Drains & Airways       Active LDAs       Name Placement date Placement time Site Days    Peripheral IV 11/24/23 0026 Left;Posterior Forearm 11/24/23  0026  Forearm  less than 1                    Labs (abnormal labs have a star):   Labs Reviewed   COMPREHENSIVE METABOLIC PANEL - Abnormal; Notable for the following components:       Result Value    Glucose 210 (*)     BUN 28 (*)     Sodium 135 (*)     Chloride 93 (*)     Alkaline Phosphatase 209 (*)     BUN/Creatinine Ratio 32.2 (*)     Anion Gap 15.8 (*)     All other components within normal limits    Narrative:     GFR Normal >60  Chronic Kidney Disease <60  Kidney Failure <15    The GFR formula is  only valid for adults with stable renal function between ages 18 and 70.   LIPASE - Abnormal; Notable for the following components:    Lipase 7 (*)     All other components within normal limits   SINGLE HSTROPONIN T - Abnormal; Notable for the following components:    HS Troponin T 33 (*)     All other components within normal limits    Narrative:     High Sensitive Troponin T Reference Range:  <14.0 ng/L- Negative Female for AMI  <22.0 ng/L- Negative Male for AMI  >=14 - Abnormal Female indicating possible myocardial injury.  >=22 - Abnormal Male indicating possible myocardial injury.   Clinicians would have to utilize clinical acumen, EKG, Troponin, and serial changes to determine if it is an Acute Myocardial Infarction or myocardial injury due to an underlying chronic condition.        CBC WITH AUTO DIFFERENTIAL - Abnormal; Notable for the following components:    WBC 63.14 (*)     RBC 3.13 (*)     Hemoglobin 9.4 (*)     Hematocrit 29.3 (*)     RDW 19.4 (*)     RDW-SD 64.6 (*)     MPV 12.7 (*)     All other components within normal limits   MANUAL DIFFERENTIAL - Abnormal; Notable for the following components:    Neutrophil % 85.0 (*)     Lymphocyte % 1.0 (*)     Metamyelocyte % 1.0 (*)     Myelocyte % 4.0 (*)     Neutrophils Absolute 53.67 (*)     Lymphocytes Absolute 0.63 (*)     Monocytes Absolute 5.05 (*)     Eosinophils Absolute 0.63 (*)     All other components within normal limits   BNP (IN-HOUSE) - Abnormal; Notable for the following components:    proBNP 3,717.0 (*)     All other components within normal limits    Narrative:     This assay is used as an aid in the diagnosis of individuals suspected of having heart failure. It can be used as an aid in the diagnosis of acute decompensated heart failure (ADHF) in patients presenting with signs and symptoms of ADHF to the emergency department (ED). In addition, NT-proBNP of <300 pg/mL indicates ADHF is not likely.    Age Range Result Interpretation  NT-proBNP  "Concentration (pg/mL:      <50             Positive            >450                   Gray                 300-450                    Negative             <300    50-75           Positive            >900                  Gray                300-900                  Negative            <300      >75             Positive            >1800                  Gray                300-1800                  Negative            <300   URINALYSIS W/ MICROSCOPIC IF INDICATED (NO CULTURE) - Normal    Narrative:     Urine microscopic not indicated.   MAGNESIUM - Normal   PROCALCITONIN - Normal    Narrative:     As a Marker for Sepsis (Non-Neonates):    1. <0.5 ng/mL represents a low risk of severe sepsis and/or septic shock.  2. >2 ng/mL represents a high risk of severe sepsis and/or septic shock.    As a Marker for Lower Respiratory Tract Infections that require antibiotic therapy:    PCT on Admission    Antibiotic Therapy       6-12 Hrs later    >0.5                Strongly Recommended  >0.25 - <0.5        Recommended   0.1 - 0.25          Discouraged              Remeasure/reassess PCT  <0.1                Strongly Discouraged     Remeasure/reassess PCT    As 28 day mortality risk marker: \"Change in Procalcitonin Result\" (>80% or <=80%) if Day 0 (or Day 1) and Day 4 values are available. Refer to http://www.[facility]-pct-calculator.com    Change in PCT <=80%  A decrease of PCT levels below or equal to 80% defines a positive change in PCT test result representing a higher risk for 28-day all-cause mortality of patients diagnosed with severe sepsis for septic shock.    Change in PCT >80%  A decrease of PCT levels of more than 80% defines a negative change in PCT result representing a lower risk for 28-day all-cause mortality of patients diagnosed with severe sepsis or septic shock.      BLOOD CULTURE   BLOOD CULTURE   RESPIRATORY PANEL PCR W/ COVID-19 (SARS-COV-2), NP SWAB IN UTM/VTP, 2 HR TAT   LACTIC ACID, PLASMA   COMPREHENSIVE " METABOLIC PANEL   CBC (NO DIFF)   CBC AND DIFFERENTIAL    Narrative:     The following orders were created for panel order CBC & Differential.  Procedure                               Abnormality         Status                     ---------                               -----------         ------                     CBC Auto Differential[[id number]]        Abnormal            Final result                 Please view results for these tests on the individual orders.       EKG:   ECG 12 Lead Electrolyte Imbalance   Preliminary Result   HEART RATE= 109  bpm   RR Interval= 550  ms   NE Interval= 143  ms   P Horizontal Axis= 48  deg   P Front Axis= 57  deg   QRSD Interval= 82  ms   QT Interval= 393  ms   QTcB= 530  ms   QRS Axis= 14  deg   T Wave Axis= 138  deg   - ABNORMAL ECG -   Sinus tachycardia   Ventricular bigeminy   Nonspecific T abnormalities, lateral leads   Electronically Signed By:    Date and Time of Study: 2023-11-23 23:47:58          Meds given in ED:   Medications   ampicillin-sulbactam (UNASYN) 3 g in sodium chloride 0.9 % 100 mL IVPB-VTB (3 g Intravenous New Bag 11/24/23 0253)   azithromycin (ZITHROMAX) 500 mg in sodium chloride 0.9 % 250 mL IVPB-VTB (500 mg Intravenous New Bag 11/24/23 0252)   sodium chloride 0.9 % flush 10 mL (has no administration in time range)   sennosides-docusate (PERICOLACE) 8.6-50 MG per tablet 2 tablet (has no administration in time range)     And   polyethylene glycol (MIRALAX) packet 17 g (has no administration in time range)     And   bisacodyl (DULCOLAX) EC tablet 5 mg (has no administration in time range)     And   bisacodyl (DULCOLAX) suppository 10 mg (has no administration in time range)   nitroglycerin (NITROSTAT) SL tablet 0.4 mg (has no administration in time range)   acetaminophen (TYLENOL) tablet 650 mg (has no administration in time range)   melatonin tablet 3 mg (has no administration in time range)   ondansetron (ZOFRAN) tablet 4 mg (has no administration in  "time range)     Or   ondansetron (ZOFRAN) injection 4 mg (has no administration in time range)   ondansetron (ZOFRAN) injection 4 mg (4 mg Intravenous Given 11/24/23 0026)   iopamidol (ISOVUE-370) 76 % injection 100 mL (95 mL Intravenous Given 11/24/23 0033)   furosemide (LASIX) injection 40 mg (40 mg Intravenous Given 11/24/23 0114)   prochlorperazine (COMPAZINE) injection 10 mg (10 mg Intravenous Given 11/24/23 0226)       Imaging results:  CT Angiogram Abdomen Pelvis    Result Date: 11/24/2023  Electronically signed by Yuki Starr MD on 11-24-23 at 0124    CT Angiogram Chest    Result Date: 11/24/2023  Electronically signed by Yuki Starr MD on 11-24-23 at 0122    XR Chest 1 View    Result Date: 11/24/2023  Electronically signed by Yuki Starr MD on 11-24-23 at 0100     Ambulatory status:   - w assistance x 2     Social issues:   Social History     Socioeconomic History    Marital status:    Tobacco Use    Smoking status: Former     Packs/day: 1.00     Years: 20.00     Additional pack years: 0.00     Total pack years: 20.00     Types: Cigarettes     Passive exposure: Past    Smokeless tobacco: Never    Tobacco comments:     30  years ago   Vaping Use    Vaping Use: Never used   Substance and Sexual Activity    Alcohol use: Yes     Comment: \"rare\"    Drug use: Never    Sexual activity: Defer       NIH Stroke Scale:        Nursing report ED to floor:   "

## 2023-11-24 NOTE — PAYOR COMM NOTE
"Merlin Maribell WATERS (83 y.o. Female)        PLEASE SEE ATTACHED FOR INPT AUTH.    REF#[id number]    PT WAS JUST ADMITTED EARLY THIS AM-- I WILL BE SENDING ADDT CLNS ON MONDAY 11/27/23.    PLEASE CALL   OR  [phone] WITH INPT AUHT.     THANK YOU    BARBI CASTRO LPN CCP   Date of Birth   1940    Social Security Number       Address   [address] [place] [address]    Home Phone   [phone]    MRN   [id number]       Zoroastrianism   Unknown    Marital Status                               Admission Date   11/23/23    Admission Type   Emergency    Admitting Provider   Junior Doan MD    Attending Provider   Junior Dona MD    Department, Room/Bed   [address], [address]/1       Discharge Date       Discharge Disposition       Discharge Destination                                 Attending Provider: Junior Doan MD    Allergies: Aspirin, Diphenhydramine Hcl, Hydroxyurea, Oxycodone    Isolation: Contact   Infection: ESBL E coli (11/05/23)   Code Status: CPR    Ht: 157.5 cm (62\")   Wt: 61.5 kg (135 lb 9.3 oz)    Admission Cmt: None   Principal Problem: CAP (community acquired pneumonia) [J18.9]                   Active Insurance as of 11/23/2023       Primary Coverage       Payor Plan Insurance Group Employer/Plan Group    AETNA MEDICARE REPLACEMENT AETNA MEDICARE REPLACEMENT [id number]       Payor Plan Address Payor Plan Phone Number Payor Plan Fax Number Effective Dates    [address] [phone]  1/1/2022 - None Entered    [facility] [id number]         Subscriber Name Subscriber Birth Date Member ID       MARIBELL BOYER 1940 [id number]                     Emergency Contacts        (Rel.) Home Phone Work Phone Mobile Phone    Karen Gutierrez (1stPOA) (Daughter) -- -- [phone]    Karan Boyer (2ndPOA) (Son) -- -- [phone]    RAY BOYER (Daughter) [phone] -- [phone]    " Greg Boyer (Son) -- -- [phone]    MEG BANKS (Brother) -- -- [phone]              [place]: [facility] [id number]  Tax ID [id number]     History & Physical        Junior Doan MD at 11/24/23 0943              Patient Name:  Catherine Boyer  YOB: 1940  MRN:  [id number]  Admit Date:  11/23/2023  Patient Care Team:  Brett Jackson MD as PCP - General (Internal Medicine)  Gerard Foreman MD as Referring Physician (Medical Oncology)  Darrell Reinoso MD as Consulting Physician (Hematology and Oncology)  Albin Barriga MD as Cardiologist (Cardiology)  Richard Aldana RN as       Subjective  History Present Illness     Chief Complaint   Patient presents with    Vomiting    Nausea    Abdominal Pain     The patient is a 83 y.o. female with a history of HTN, HLD, CAD, Myeloproliferative disorder, PAF, Type 2 DM, chronic diastolic CHF, chronic hypoxic respiratory failure on 3L NC at home with activity and at night who presented to hospital with nausea, vomiting starting the day of arrival.  She was just recently discharged on the day prior to presentation after a prolonged hospital course of 20 days with complicated course including ESBL UTI, acute renal failure, lung mass which required a bronchoscopy which was unfortunately complicated by an iatrogenic pneumothorax.  The bronchoscopy did not show any evidence of malignancy, nor was there any evidence of interstitial lung disease.  In any case, the day after discharge, in the morning, she started experiencing nausea and vomiting.  Started feeling poorly and was subsequently brought back to the emergency department on the evening of the day after discharge.  Upon presentation to the emergency department she underwent a CT angio of the chest abdomen pelvis that did not reveal any aneurysm or dissection.  No pulmonary embolism was identified.  Diffuse bilateral airspace opacities concerning for multifocal pneumonia or aspiration.  Some  findings consistent with cirrhosis were noted along with some splenomegaly.  Laboratory work-up significant for white blood cell count elevation to 63.14, lactic acid elevation of 5.2, normal renal function, proBNP of 3700, troponin level of 33.           Personal History     Past Medical History:   Diagnosis Date    Atherosclerosis of abdominal aorta 02/05/2023    Atrial fibrillation     Breast cancer     CAD (coronary artery disease)     NSTEMI 2/2022: 90% ostial LAD, 99% D1, 70% mid-distal LAD (medical therapy). She received two stents (2.5x18 and 2.5x26mm [place] LEFTY) but I don't know which one went to which lesion.    Carotid atherosclerosis     Cholecystitis 11/22/2022    Added automatically from request for surgery [id number]    Chronic diastolic (congestive) heart failure     COVID 10/29/2022    GERD (gastroesophageal reflux disease)     Glaucoma     History of cataract     Hypertension     Microcytic anemia     per Dr. oleg pate office  note 6/30/22-dd    Myeloproliferative disorder     JAK2 positive    Nonbacterial thrombotic endocarditis     6/2021: 4x5mm vegetation on the ventricular surface of the anterior MV, negative blood cultures    Porcelain gallbladder     PUD (peptic ulcer disease)     TIA (transient ischemic attack)     Type 2 diabetes mellitus     Type 2 diabetes mellitus with circulatory disorder, without long-term current use of insulin 07/14/2022    Upper GI bleed      Past Surgical History:   Procedure Laterality Date    BREAST LUMPECTOMY  1999    BRONCHOSCOPY Bilateral 11/8/2023    Procedure: BRONCHOSCOPY UNDER FLUORO WITH BAL,  BIOPSIES; ACETYLCYSTEIN 4ML GIVEN;  Surgeon: Raoul Alford MD;  Location: [facility] ENDOSCOPY;  Service: Pulmonary;  Laterality: Bilateral;  PRE- PULMONARY INFILTRATES  POST- SAME    CARDIAC CATHETERIZATION N/A 09/02/2022    Procedure: Coronary angiography;  Surgeon: Guero Verde MD;  Location: [facility] CATH INVASIVE LOCATION;  Service: Cardiovascular;  Laterality:  "N/A;    CARDIAC CATHETERIZATION N/A 2022    Procedure: Stent LEFTY coronary;  Surgeon: Guero Verde MD;  Location: [facility] CATH INVASIVE LOCATION;  Service: Cardiovascular;  Laterality: N/A;    CATARACT EXTRACTION      CHOLECYSTECTOMY      CHOLECYSTECTOMY WITH INTRAOPERATIVE CHOLANGIOGRAM N/A 2022    Procedure: CHOLECYSTECTOMY LAPAROSCOPIC INTRAOPERATIVE CHOLANGIOGRAM;  Surgeon: Dani Grover Jr., MD;  Location: [facility] MAIN OR;  Service: General;  Laterality: N/A;    COLONOSCOPY N/A 2023    Procedure: COLONOSCOPY TO CECUM & T.I.;  Surgeon: Guero Ferris MD;  Location: [facility] ENDOSCOPY;  Service: Gastroenterology;  Laterality: N/A;  PRE- MELENA, GI BLEED  POST- DIVERTICULOSIS, INT HEMORRHOIDS    ENDOSCOPY N/A 2023    Procedure: ESOPHAGOGASTRODUODENOSCOPY WITH BIOPSIES;  Surgeon: Charles Diaz MD;  Location: [facility] ENDOSCOPY;  Service: Gastroenterology;  Laterality: N/A;  pre: abd pain, nausea  post: hiatal hernia, mild gastritis, sloughing of the esophagus    ENTEROSCOPY SMALL BOWEL N/A 2023    Procedure: ENTEROSCOPY SMALL BOWEL;  Surgeon: Guero Ferris MD;  Location: [facility] ENDOSCOPY;  Service: Gastroenterology;  Laterality: N/A;  PRE- MELENA, GI BLEED  POST- HIATAL HERNIA    JOINT REPLACEMENT      UPPER GASTROINTESTINAL ENDOSCOPY       Family History   Problem Relation Age of Onset    Ovarian cancer Mother     Lung cancer Father     Lung cancer Sister     Malig Hyperthermia Neg Hx      Social History     Tobacco Use    Smoking status: Former     Packs/day: 1.00     Years: 20.00     Additional pack years: 0.00     Total pack years: 20.00     Types: Cigarettes     Quit date: 1950     Years since quittin.0     Passive exposure: Past    Smokeless tobacco: Never    Tobacco comments:     30  years ago   Vaping Use    Vaping Use: Never used   Substance Use Topics    Alcohol use: Not Currently     Comment: \"rare\"    Drug use: Never     No current " facility-administered medications on file prior to encounter.     Current Outpatient Medications on File Prior to Encounter   Medication Sig Dispense Refill    acetaminophen (TYLENOL) 500 MG tablet Take 1 tablet by mouth As Needed.      apixaban (ELIQUIS) 5 MG tablet tablet Take 1 tablet by mouth 2 (Two) Times a Day. 180 tablet 3    aspirin 81 MG EC tablet Take 1 tablet by mouth Daily.      bisacodyl (DULCOLAX) 10 MG suppository Insert 1 suppository into the rectum Daily As Needed for Constipation (Use if bisacodyl oral is ineffective).      budesonide-formoterol (SYMBICORT) 160-4.5 MCG/ACT inhaler Inhale 2 puffs 2 (Two) Times a Day.      cadexomer iodine (IODOSORB) 0.9 % gel Apply 1 application  topically to the appropriate area as directed Daily.      famotidine (PEPCID) 20 MG tablet Take 1 tablet by mouth Daily.      furosemide (LASIX) 20 MG tablet Take 0.5 tablets by mouth Daily.      latanoprost (XALATAN) 0.005 % ophthalmic solution Administer 1 drop to both eyes.      metoprolol succinate XL (TOPROL-XL) 25 MG 24 hr tablet Take 1 tablet by mouth Daily. 90 tablet 2    Momelotinib Dihydrochloride (OJJAARA) 200 MG tablet Take 1 tablet by mouth Daily. 28 tablet 3    polyethylene glycol (MIRALAX) 17 g packet Take 17 g by mouth Daily As Needed (Use if senna-docusate is ineffective).      predniSONE (DELTASONE) 5 MG tablet Take 4 tablets by mouth Daily With Breakfast for 6 days, THEN 3 tablets Daily With Breakfast for 7 days, THEN 2 tablets Daily With Breakfast for 7 days, THEN 1 tablet Daily With Breakfast for 7 days. 66 tablet 0    saccharomyces boulardii (FLORASTOR) 250 MG capsule Take 1 capsule by mouth 2 (Two) Times a Day. 60 capsule 0    sertraline (ZOLOFT) 100 MG tablet Take 1 tablet by mouth Daily.      simethicone (MYLICON) 80 MG chewable tablet Chew 1 tablet by mouth 4 (Four) Times a Day As Needed for Flatulence. 100 tablet 0    tiotropium bromide monohydrate (SPIRIVA RESPIMAT) 2.5 MCG/ACT aerosol solution  inhaler Inhale 2 puffs Daily.       Allergies   Allergen Reactions    Aspirin Unknown - Low Severity    Diphenhydramine Hcl Anxiety     Benadryl IVP    Hydroxyurea Other (See Comments)     Her hemoglobin drop and was stop in February 2022 and start taking Jakafi    Oxycodone Unknown - Low Severity       Objective   Objective     Vital Signs  Temp:  [96.9 °F (36.1 °C)-97.9 °F (36.6 °C)] 97.9 °F (36.6 °C)  Heart Rate:  [] 59  Resp:  [18] 18  BP: (104-136)/(47-72) 115/67  SpO2:  [92 %-100 %] 92 %  on  Flow (L/min):  [2-3] 2;   Device (Oxygen Therapy): nasal cannula  Body mass index is 24.8 kg/m².    Physical Exam  Constitutional:       General: She is not in acute distress.  HENT:      Head: Normocephalic and atraumatic.   Eyes:      Extraocular Movements: Extraocular movements intact.      Pupils: Pupils are equal, round, and reactive to light.   Cardiovascular:      Rate and Rhythm: Normal rate and regular rhythm.   Pulmonary:      Effort: Pulmonary effort is normal. No respiratory distress.      Comments: On 3L of O2 via NC  Abdominal:      General: There is no distension.      Palpations: Abdomen is soft.      Tenderness: There is no abdominal tenderness.   Musculoskeletal:      Right lower leg: No edema.      Left lower leg: No edema.   Skin:     General: Skin is warm and dry.   Neurological:      General: No focal deficit present.      Mental Status: She is alert and oriented to person, place, and time.         Results Review:  I reviewed the patient's new clinical results.  I reviewed the patient's new imaging results and agree with the interpretation.  I reviewed the patient's other test results and agree with the interpretation  I personally viewed and interpreted the patient's EKG/Telemetry data  Discussed with ED provider.    Lab Results (last 24 hours)       Procedure Component Value Units Date/Time    CBC & Differential [[id number]]  (Abnormal) Collected: 11/24/23 0002    Specimen: Blood Updated:  11/24/23 0018    Narrative:      The following orders were created for panel order CBC & Differential.  Procedure                               Abnormality         Status                     ---------                               -----------         ------                     CBC Auto Differential[[id number]]        Abnormal            Final result                 Please view results for these tests on the individual orders.    Comprehensive Metabolic Panel [[id number]]  (Abnormal) Collected: 11/24/23 0002    Specimen: Blood Updated: 11/24/23 0030     Glucose 210 mg/dL      BUN 28 mg/dL      Creatinine 0.87 mg/dL      Sodium 135 mmol/L      Potassium 4.4 mmol/L      Comment: Slight hemolysis detected by analyzer. Result may be falsely elevated.        Chloride 93 mmol/L      CO2 26.2 mmol/L      Calcium 8.7 mg/dL      Total Protein 6.2 g/dL      Albumin 3.7 g/dL      ALT (SGPT) 24 U/L      AST (SGOT) 28 U/L      Comment: Slight hemolysis detected by analyzer. Result may be falsely elevated.        Alkaline Phosphatase 209 U/L      Total Bilirubin 1.0 mg/dL      Globulin 2.5 gm/dL      A/G Ratio 1.5 g/dL      BUN/Creatinine Ratio 32.2     Anion Gap 15.8 mmol/L      eGFR 65.4 mL/min/1.73     Narrative:      GFR Normal >60  Chronic Kidney Disease <60  Kidney Failure <15    The GFR formula is only valid for adults with stable renal function between ages 18 and 70.    Lipase [[id number]]  (Abnormal) Collected: 11/24/23 0002    Specimen: Blood Updated: 11/24/23 0028     Lipase 7 U/L     Single High Sensitivity Troponin T [[id number]]  (Abnormal) Collected: 11/24/23 0002    Specimen: Blood Updated: 11/24/23 0028     HS Troponin T 33 ng/L     Narrative:      High Sensitive Troponin T Reference Range:  <14.0 ng/L- Negative Female for AMI  <22.0 ng/L- Negative Male for AMI  >=14 - Abnormal Female indicating possible myocardial injury.  >=22 - Abnormal Male indicating possible myocardial injury.   Clinicians would have to utilize  clinical acumen, EKG, Troponin, and serial changes to determine if it is an Acute Myocardial Infarction or myocardial injury due to an underlying chronic condition.         Magnesium [[id number]]  (Normal) Collected: 11/24/23 0002    Specimen: Blood Updated: 11/24/23 0030     Magnesium 1.7 mg/dL     CBC Auto Differential [[id number]]  (Abnormal) Collected: 11/24/23 0002    Specimen: Blood Updated: 11/24/23 0018     WBC 63.14 10*3/mm3      RBC 3.13 10*6/mm3      Hemoglobin 9.4 g/dL      Hematocrit 29.3 %      MCV 93.6 fL      MCH 30.0 pg      MCHC 32.1 g/dL      RDW 19.4 %      RDW-SD 64.6 fl      MPV 12.7 fL      Platelets 232 10*3/mm3     Manual Differential [[id number]]  (Abnormal) Collected: 11/24/23 0002    Specimen: Blood Updated: 11/24/23 0111     Neutrophil % 85.0 %      Lymphocyte % 1.0 %      Monocyte % 8.0 %      Eosinophil % 1.0 %      Bands %  --     Comment: 1+          Metamyelocyte % 1.0 %      Myelocyte % 4.0 %      Neutrophils Absolute 53.67 10*3/mm3      Lymphocytes Absolute 0.63 10*3/mm3      Monocytes Absolute 5.05 10*3/mm3      Eosinophils Absolute 0.63 10*3/mm3      Anisocytosis Slight/1+     Poikilocytes Slight/1+     WBC Morphology Normal     Platelet Morphology Normal    Procalcitonin [[id number]]  (Normal) Collected: 11/24/23 0002    Specimen: Blood Updated: 11/24/23 0242     Procalcitonin 0.25 ng/mL     Narrative:      As a Marker for Sepsis (Non-Neonates):    1. <0.5 ng/mL represents a low risk of severe sepsis and/or septic shock.  2. >2 ng/mL represents a high risk of severe sepsis and/or septic shock.    As a Marker for Lower Respiratory Tract Infections that require antibiotic therapy:    PCT on Admission    Antibiotic Therapy       6-12 Hrs later    >0.5                Strongly Recommended  >0.25 - <0.5        Recommended   0.1 - 0.25          Discouraged              Remeasure/reassess PCT  <0.1                Strongly Discouraged     Remeasure/reassess PCT    As 28 day mortality risk  "marker: \"Change in Procalcitonin Result\" (>80% or <=80%) if Day 0 (or Day 1) and Day 4 values are available. Refer to http://www.[facility]-pct-calculator.com    Change in PCT <=80%  A decrease of PCT levels below or equal to 80% defines a positive change in PCT test result representing a higher risk for 28-day all-cause mortality of patients diagnosed with severe sepsis for septic shock.    Change in PCT >80%  A decrease of PCT levels of more than 80% defines a negative change in PCT result representing a lower risk for 28-day all-cause mortality of patients diagnosed with severe sepsis or septic shock.       BNP [[id number]]  (Abnormal) Collected: 11/24/23 0002    Specimen: Blood Updated: 11/24/23 0253     proBNP 3,717.0 pg/mL     Narrative:      This assay is used as an aid in the diagnosis of individuals suspected of having heart failure. It can be used as an aid in the diagnosis of acute decompensated heart failure (ADHF) in patients presenting with signs and symptoms of ADHF to the emergency department (ED). In addition, NT-proBNP of <300 pg/mL indicates ADHF is not likely.    Age Range Result Interpretation  NT-proBNP Concentration (pg/mL:      <50             Positive            >450                   Gray                 300-450                    Negative             <300    50-75           Positive            >900                  Gray                300-900                  Negative            <300      >75             Positive            >1800                  Gray                300-1800                  Negative            <300    Urinalysis With Microscopic If Indicated (No Culture) - Urine, Clean Catch [[id number]]  (Normal) Collected: 11/24/23 0201    Specimen: Urine, Clean Catch Updated: 11/24/23 0230     Color, UA Yellow     Appearance, UA Clear     pH, UA 7.0     Specific Gravity, UA 1.015     Glucose, UA Negative     Ketones, UA Negative     Bilirubin, UA Negative     Blood, UA Negative     Protein, " UA Negative     Leuk Esterase, UA Negative     Nitrite, UA Negative     Urobilinogen, UA 0.2 E.U./dL    Narrative:      Urine microscopic not indicated.    Blood Culture - Blood, Hand, Right [[id number]] Collected: 11/24/23 0201    Specimen: Blood from Hand, Right Updated: 11/24/23 0206    Respiratory Panel PCR w/COVID-19(SARS-CoV-2) DAMIAN/ARIANA/JAGDEEP/PAD/COR/ADRIANA In-House, NP Swab in UTM/VTM, 2 HR TAT - Swab, Nasopharynx [[id number]]  (Normal) Collected: 11/24/23 0201    Specimen: Swab from Nasopharynx Updated: 11/24/23 0400     ADENOVIRUS, PCR Not Detected     Coronavirus 229E Not Detected     Coronavirus HKU1 Not Detected     Coronavirus NL63 Not Detected     Coronavirus OC43 Not Detected     COVID19 Not Detected     Human Metapneumovirus Not Detected     Human Rhinovirus/Enterovirus Not Detected     Influenza A PCR Not Detected     Influenza B PCR Not Detected     Parainfluenza Virus 1 Not Detected     Parainfluenza Virus 2 Not Detected     Parainfluenza Virus 3 Not Detected     Parainfluenza Virus 4 Not Detected     RSV, PCR Not Detected     Bordetella pertussis pcr Not Detected     Bordetella parapertussis PCR Not Detected     Chlamydophila pneumoniae PCR Not Detected     Mycoplasma pneumo by PCR Not Detected    Narrative:      In the setting of a positive respiratory panel with a viral infection PLUS a negative procalcitonin without other underlying concern for bacterial infection, consider observing off antibiotics or discontinuation of antibiotics and continue supportive care. If the respiratory panel is positive for atypical bacterial infection (Bordetella pertussis, Chlamydophila pneumoniae, or Mycoplasma pneumoniae), consider antibiotic de-escalation to target atypical bacterial infection.    Blood Culture - Blood, Arm, Left [[id number]] Collected: 11/24/23 0241    Specimen: Blood from Arm, Left Updated: 11/24/23 0245    Lactic Acid, Plasma [[id number]]  (Abnormal) Collected: 11/24/23 0241    Specimen: Blood  Updated: 11/24/23 0325     Lactate 5.2 mmol/L     Comprehensive Metabolic Panel [[id number]]  (Abnormal) Collected: 11/24/23 0736    Specimen: Blood Updated: 11/24/23 0806     Glucose 133 mg/dL      BUN 23 mg/dL      Creatinine 0.91 mg/dL      Sodium 139 mmol/L      Potassium 4.1 mmol/L      Chloride 89 mmol/L      CO2 27.2 mmol/L      Calcium 9.2 mg/dL      Total Protein 6.7 g/dL      Albumin 4.0 g/dL      ALT (SGPT) 24 U/L      AST (SGOT) 20 U/L      Alkaline Phosphatase 265 U/L      Total Bilirubin 1.1 mg/dL      Globulin 2.7 gm/dL      A/G Ratio 1.5 g/dL      BUN/Creatinine Ratio 25.3     Anion Gap 22.8 mmol/L      eGFR 62.0 mL/min/1.73     Narrative:      GFR Normal >60  Chronic Kidney Disease <60  Kidney Failure <15    The GFR formula is only valid for adults with stable renal function between ages 18 and 70.    CBC (No Diff) [[id number]]  (Abnormal) Collected: 11/24/23 0736    Specimen: Blood Updated: 11/24/23 0754     WBC 83.18 10*3/mm3      RBC 3.40 10*6/mm3      Hemoglobin 10.1 g/dL      Hematocrit 31.4 %      MCV 92.4 fL      MCH 29.7 pg      MCHC 32.2 g/dL      RDW 19.3 %      RDW-SD 63.2 fl      MPV 12.6 fL      Platelets 298 10*3/mm3     STAT Lactic Acid, Reflex [[id number]]  (Abnormal) Collected: 11/24/23 0756    Specimen: Blood Updated: 11/24/23 0821     Lactate 5.6 mmol/L             Results for orders placed or performed during the hospital encounter of 11/02/23   Blood Culture - Blood, Arm, Right    Specimen: Arm, Right; Blood   Result Value Ref Range    Blood Culture No growth at 5 days        Imaging Results (Last 24 Hours)       Procedure Component Value Units Date/Time    CT Angiogram Abdomen Pelvis [[id number]] Collected: 11/24/23 0125     Updated: 11/24/23 0125    Narrative:        Patient: MARIBELL RUGGIERO  Time Out: 01:24  Exam(s): CTA ABDOMEN + PELVIS     EXAM:    CT Angiography Abdomen and Pelvis With Intravenous Contrast    CLINICAL HISTORY:       Concern for acute aortic  syndrome.    TECHNIQUE:    Axial computed tomographic angiography images of the abdomen and pelvis   with intravenous contrast.  CTDI is 37 mGy and DLP is 934 mGy-cm.  This   CT exam was performed according to the principle of ALARA (As Low As   Reasonably Achievable) by using one or more of the following dose   reduction techniques: automated exposure control, adjustment of the mA   and or kV according to patient size, and or use of iterative   reconstruction technique.    MIP reconstructed images were created and reviewed.    COMPARISON:    No relevant prior studies available.    FINDINGS:     VASCULATURE:    Aorta:  No acute findings.  No aortic aneurysm or dissection.    Celiac trunk and mesenteric arteries:  Mild atherosclerosis of the   origin of the celiac trunk, SMA and BETZY without significant stenosis.    Renal arteries:  No acute findings.  No occlusion or significant   stenosis.    Iliac arteries:  Moderate atherosclerosis of the bilateral common iliac   arteries, external iliac arteries and internal iliac arteries.  No   occlusion or significant stenosis.      Lung bases:  Unremarkable.  No mass.  No consolidation.     ABDOMEN:    Liver:  Nodular contour of the liver is concerning for cirrhosis.    Gallbladder and bile ducts:  Cholecystectomy.  No ductal dilation.    Pancreas:  Unremarkable.  No ductal dilation.  No mass.    Spleen:  Splenomegaly.  The spleen is heterogeneous which could   represent a perfusion anomaly.    Adrenals:  Unremarkable.  No mass.    Kidneys and ureters:  Unremarkable.  No hydronephrosis.  No solid mass.    Stomach and bowel:  Diverticulosis.  No obstruction.  No mucosal   thickening.     PELVIS:    Appendix:  No findings to suggest acute appendicitis.    Bladder:  Unremarkable.  No mass.    Reproductive:  Unremarkable as visualized.     ABDOMEN and PELVIS:    Intraperitoneal space:  Unremarkable.  No significant fluid collection.    No free air.    Bones joints:  There are  degenerative changes of the spine.  No acute   fracture.  No dislocation.    Soft tissues:  Unremarkable.    Lymph nodes:  Unremarkable.  No enlarged lymph nodes.    IMPRESSION:       1.  No aortic aneurysm or dissection.  2.  Nodular contour of the liver is concerning for cirrhosis.  3.  The spleen is heterogeneous which could represent a perfusion anomaly.    4.  Splenomegaly is nonspecific but concerning for portal hypertension.  5.  Diverticulosis.      Impression:          Electronically signed by Yuki Starr MD on 11-24-23 at 0124    CT Angiogram Chest [[id number]] Collected: 11/24/23 0122     Updated: 11/24/23 0122    Narrative:        Patient: MARIBELL RUGGIERO  Time Out: 01:22  Exam(s): CTA CHEST     EXAM:    CT Angiography Chest With Intravenous Contrast    CLINICAL HISTORY:       Concern for acute aortic syndrome.    TECHNIQUE:    Axial computed tomographic angiography images of the chest with   intravenous contrast.  CTDI is 37 mGy and DLP is 934 mGy-cm.  This CT   exam was performed according to the principle of ALARA (As Low As   Reasonably Achievable) by using one or more of the following dose   reduction techniques: automated exposure control, adjustment of the mA   and or kV according to patient size, and or use of iterative   reconstruction technique.    MIP reconstructed images were created and reviewed.    COMPARISON:    No relevant prior studies available.    FINDINGS:    Pulmonary arteries:  Unremarkable.  No pulmonary embolus.    Aorta:  Mild atherosclerosis.  No aortic aneurysm or dissection.    Lungs:  Diffuse bilateral airspace opacities are concerning for   multifocal pneumonia and or aspiration.  Cannot exclude superimposed   pulmonary edema.    Pleural space:  Unremarkable.  No significant effusion.  No   pneumothorax.    Heart:  Unremarkable.  No cardiomegaly.  No significant pericardial   effusion.  No evidence of RV dysfunction.    Bones joints:  There are degenerative changes of the  spine.  No acute   fracture.    Soft tissues:  Unremarkable.    Lymph nodes:  Unremarkable.  No enlarged lymph nodes.    IMPRESSION:       1.  No aortic aneurysm or dissection.  2.  No pulmonary embolus.  3.  Diffuse bilateral airspace opacities are concerning for multifocal   pneumonia and or aspiration.  Cannot exclude superimposed pulmonary edema.      Impression:          Electronically signed by Yuki Starr MD on 11-24-23 at 0122    XR Chest 1 View [[id number]] Collected: 11/24/23 0100     Updated: 11/24/23 0100    Narrative:        Patient: MARIBELL RUGGIERO  Time Out: 01:00  Exam(s): XR CXR 1 VIEW     EXAM:    XR Chest, 1 View    CLINICAL HISTORY:       Nausea and upper abd pain.    TECHNIQUE:    Frontal view of the chest.    COMPARISON:    Chest radiograph 11 20 2023    FINDINGS:    Lungs:  Significantly worsened bilateral airspace opacities.    Pleural space:  New small left pleural effusion.  No pneumothorax.    Heart:  Unremarkable.  No cardiomegaly.    Mediastinum:  Unremarkable.  Normal mediastinal contour.    Bones joints:  There are degenerative changes of the spine.  No acute   fracture.    IMPRESSION:       1.  Significantly worsened bilateral airspace opacities.  2.  New small left pleural effusion.      Impression:          Electronically signed by Yuki Starr MD on 11-24-23 at 0100            Results for orders placed during the hospital encounter of 05/03/23    Adult Transthoracic Echo Complete W/ Cont if Necessary Per Protocol    Interpretation Summary    Left ventricular systolic function is low normal. Left ventricular ejection fraction appears to be 51 - 55%.    Left ventricular wall thickness is consistent with mild septal asymmetric hypertrophy.    The left atrial cavity is moderately dilated.    Mild aortic valve stenosis is present.    Peak velocity of the flow distal to the aortic valve is 202 cm/s. Aortic valve maximum pressure gradient is 16 mmHg. Aortic valve mean pressure  gradient is 9 mmHg. Aortic valve dimensionless index is 0.8 .    Estimated right ventricular systolic pressure from tricuspid regurgitation is markedly elevated (>55 mmHg). Calculated right ventricular systolic pressure from tricuspid regurgitation is 62 mmHg.    Severe pulmonary hypertension is present.    Mild dilation of the ascending aorta is present. 3.7 cm.    Mild to moderate mitral regurgitation is noted.      ECG 12 Lead Electrolyte Imbalance   Preliminary Result   HEART RATE= 109  bpm   RR Interval= 550  ms   MS Interval= 143  ms   P Horizontal Axis= 48  deg   P Front Axis= 57  deg   QRSD Interval= 82  ms   QT Interval= 393  ms   QTcB= 530  ms   QRS Axis= 14  deg   T Wave Axis= 138  deg   - ABNORMAL ECG -   Sinus tachycardia   Ventricular bigeminy   Nonspecific T abnormalities, lateral leads   Electronically Signed By:    Date and Time of Study: 2023-11-23 23:47:58      SCANNED - TELEMETRY     Final Result                 Assessment/Plan     Active Hospital Problems    Diagnosis  POA    **CAP (community acquired pneumonia) [J18.9]  Yes      Resolved Hospital Problems   No resolved problems to display.     Sepsis secondary to pneumonia  Lactic Acidosis   -Her white blood cell count is elevated, though this is partially due to the myelodysplastic syndrome.  Lactic acid was 5.2 initially.  I do not see that she received any fluid boluses.  -Administer 1000 cc of normal saline at 250 cc an hour and monitor closely for any respiratory compromise.  -She has been started on Unasyn which is an appropriate antibiotic for aspiration pneumonia, however going through her previous CT scans, it appears that the bilateral pulmonary infiltrates are not new findings and have been present persistently in the past.  -Cultures obtained and will need to be followed.  -From going through her previous hospitalization, the bilateral pulmonary infiltrates are not new findings.    Myelodysplastic syndrome  -Patient takes ojjaara.   Hold off for now.  Ask oncology to evaluate    Atrial fibrillation  -Continue Eliquis. Metoprolol as initiated as well.    Lung mass  Status post bronchoscopy on 11/8/2023 with biopsies.  Pathology showed no evidence of malignancy.  She was previously on IV steroids for prolonged course during her previous hospitalization and has then been transitioned to oral steroids and was discharged today.  Starting to taper.  I will continue this taper for now.       I discussed the patient's findings and my recommendations with patient and nursing staff.    VTE Prophylaxis - Eliquis (home med).  Code Status - Full code.       Junior Doan MD  [facility]  11/24/23  09:43 EST      Electronically signed by Junior Doan MD at 11/24/23 1009          Emergency Department Notes        Sturgeon, Autumn, RN at 11/24/23 0316          Nursing report ED to floor  Catherine Boyer  83 y.o.  female    HPI :   Chief Complaint   Patient presents with    Vomiting    Nausea    Abdominal Pain       Admitting doctor:   Jl Parkinson MD    Admitting diagnosis:   The primary encounter diagnosis was Community acquired pneumonia, unspecified laterality. Diagnoses of Sepsis without acute organ dysfunction, due to unspecified organism and Hypoxia were also pertinent to this visit.    Code status:   Current Code Status       Date Active Code Status Order ID Comments User Context       11/24/2023 0228 CPR (Attempt to Resuscitate) [id number]  Dexter Reynolds, APRN ED        Question Answer    Code Status (Patient has no pulse and is not breathing) CPR (Attempt to Resuscitate)    Medical Interventions (Patient has pulse or is breathing) Full Support                    Allergies:   Aspirin, Diphenhydramine hcl, Hydroxyurea, and Oxycodone    Intake and Output  No intake or output data in the 24 hours ending 11/24/23 0316    Weight:       11/23/23  2312   Weight: 63.5 kg (140 lb)       Most recent vitals:   Vitals:    11/24/23 0201  11/24/23 0203 11/24/23 0221 11/24/23 0251   BP: 122/64   104/53   Pulse:  86 80    Resp:       Temp:       SpO2:  94% 93%    Weight:       Height:           Active LDAs/IV Access:   Lines, Drains & Airways       Active LDAs       Name Placement date Placement time Site Days    Peripheral IV 11/24/23 0026 Left;Posterior Forearm 11/24/23  0026  Forearm  less than 1                    Labs (abnormal labs have a star):   Labs Reviewed   COMPREHENSIVE METABOLIC PANEL - Abnormal; Notable for the following components:       Result Value    Glucose 210 (*)     BUN 28 (*)     Sodium 135 (*)     Chloride 93 (*)     Alkaline Phosphatase 209 (*)     BUN/Creatinine Ratio 32.2 (*)     Anion Gap 15.8 (*)     All other components within normal limits    Narrative:     GFR Normal >60  Chronic Kidney Disease <60  Kidney Failure <15    The GFR formula is only valid for adults with stable renal function between ages 18 and 70.   LIPASE - Abnormal; Notable for the following components:    Lipase 7 (*)     All other components within normal limits   SINGLE HSTROPONIN T - Abnormal; Notable for the following components:    HS Troponin T 33 (*)     All other components within normal limits    Narrative:     High Sensitive Troponin T Reference Range:  <14.0 ng/L- Negative Female for AMI  <22.0 ng/L- Negative Male for AMI  >=14 - Abnormal Female indicating possible myocardial injury.  >=22 - Abnormal Male indicating possible myocardial injury.   Clinicians would have to utilize clinical acumen, EKG, Troponin, and serial changes to determine if it is an Acute Myocardial Infarction or myocardial injury due to an underlying chronic condition.        CBC WITH AUTO DIFFERENTIAL - Abnormal; Notable for the following components:    WBC 63.14 (*)     RBC 3.13 (*)     Hemoglobin 9.4 (*)     Hematocrit 29.3 (*)     RDW 19.4 (*)     RDW-SD 64.6 (*)     MPV 12.7 (*)     All other components within normal limits   MANUAL DIFFERENTIAL - Abnormal; Notable  for the following components:    Neutrophil % 85.0 (*)     Lymphocyte % 1.0 (*)     Metamyelocyte % 1.0 (*)     Myelocyte % 4.0 (*)     Neutrophils Absolute 53.67 (*)     Lymphocytes Absolute 0.63 (*)     Monocytes Absolute 5.05 (*)     Eosinophils Absolute 0.63 (*)     All other components within normal limits   BNP (IN-HOUSE) - Abnormal; Notable for the following components:    proBNP 3,717.0 (*)     All other components within normal limits    Narrative:     This assay is used as an aid in the diagnosis of individuals suspected of having heart failure. It can be used as an aid in the diagnosis of acute decompensated heart failure (ADHF) in patients presenting with signs and symptoms of ADHF to the emergency department (ED). In addition, NT-proBNP of <300 pg/mL indicates ADHF is not likely.    Age Range Result Interpretation  NT-proBNP Concentration (pg/mL:      <50             Positive            >450                   Gray                 300-450                    Negative             <300    50-75           Positive            >900                  Gray                300-900                  Negative            <300      >75             Positive            >1800                  Gray                300-1800                  Negative            <300   URINALYSIS W/ MICROSCOPIC IF INDICATED (NO CULTURE) - Normal    Narrative:     Urine microscopic not indicated.   MAGNESIUM - Normal   PROCALCITONIN - Normal    Narrative:     As a Marker for Sepsis (Non-Neonates):    1. <0.5 ng/mL represents a low risk of severe sepsis and/or septic shock.  2. >2 ng/mL represents a high risk of severe sepsis and/or septic shock.    As a Marker for Lower Respiratory Tract Infections that require antibiotic therapy:    PCT on Admission    Antibiotic Therapy       6-12 Hrs later    >0.5                Strongly Recommended  >0.25 - <0.5        Recommended   0.1 - 0.25          Discouraged              Remeasure/reassess PCT  <0.1     "            Strongly Discouraged     Remeasure/reassess PCT    As 28 day mortality risk marker: \"Change in Procalcitonin Result\" (>80% or <=80%) if Day 0 (or Day 1) and Day 4 values are available. Refer to http://[email or web address]-pct-calculator.com    Change in PCT <=80%  A decrease of PCT levels below or equal to 80% defines a positive change in PCT test result representing a higher risk for 28-day all-cause mortality of patients diagnosed with severe sepsis for septic shock.    Change in PCT >80%  A decrease of PCT levels of more than 80% defines a negative change in PCT result representing a lower risk for 28-day all-cause mortality of patients diagnosed with severe sepsis or septic shock.      BLOOD CULTURE   BLOOD CULTURE   RESPIRATORY PANEL PCR W/ COVID-19 (SARS-COV-2), NP SWAB IN UTM/VTP, 2 HR TAT   LACTIC ACID, PLASMA   COMPREHENSIVE METABOLIC PANEL   CBC (NO DIFF)   CBC AND DIFFERENTIAL    Narrative:     The following orders were created for panel order CBC & Differential.  Procedure                               Abnormality         Status                     ---------                               -----------         ------                     CBC Auto Differential[[id number]]        Abnormal            Final result                 Please view results for these tests on the individual orders.       EKG:   ECG 12 Lead Electrolyte Imbalance   Preliminary Result   HEART RATE= 109  bpm   RR Interval= 550  ms   MN Interval= 143  ms   P Horizontal Axis= 48  deg   P Front Axis= 57  deg   QRSD Interval= 82  ms   QT Interval= 393  ms   QTcB= 530  ms   QRS Axis= 14  deg   T Wave Axis= 138  deg   - ABNORMAL ECG -   Sinus tachycardia   Ventricular bigeminy   Nonspecific T abnormalities, lateral leads   Electronically Signed By:    Date and Time of Study: 2023-11-23 23:47:58          Meds given in ED:   Medications   ampicillin-sulbactam (UNASYN) 3 g in sodium chloride 0.9 % 100 mL IVPB-VTB (3 g Intravenous New Bag 11/24/23 " 0253)   azithromycin (ZITHROMAX) 500 mg in sodium chloride 0.9 % 250 mL IVPB-VTB (500 mg Intravenous New Bag 11/24/23 0252)   sodium chloride 0.9 % flush 10 mL (has no administration in time range)   sennosides-docusate (PERICOLACE) 8.6-50 MG per tablet 2 tablet (has no administration in time range)     And   polyethylene glycol (MIRALAX) packet 17 g (has no administration in time range)     And   bisacodyl (DULCOLAX) EC tablet 5 mg (has no administration in time range)     And   bisacodyl (DULCOLAX) suppository 10 mg (has no administration in time range)   nitroglycerin (NITROSTAT) SL tablet 0.4 mg (has no administration in time range)   acetaminophen (TYLENOL) tablet 650 mg (has no administration in time range)   melatonin tablet 3 mg (has no administration in time range)   ondansetron (ZOFRAN) tablet 4 mg (has no administration in time range)     Or   ondansetron (ZOFRAN) injection 4 mg (has no administration in time range)   ondansetron (ZOFRAN) injection 4 mg (4 mg Intravenous Given 11/24/23 0026)   iopamidol (ISOVUE-370) 76 % injection 100 mL (95 mL Intravenous Given 11/24/23 0033)   furosemide (LASIX) injection 40 mg (40 mg Intravenous Given 11/24/23 0114)   prochlorperazine (COMPAZINE) injection 10 mg (10 mg Intravenous Given 11/24/23 0226)       Imaging results:  CT Angiogram Abdomen Pelvis    Result Date: 11/24/2023  Electronically signed by Yuki Starr MD on 11-24-23 at 0124    CT Angiogram Chest    Result Date: 11/24/2023  Electronically signed by Yuki Starr MD on 11-24-23 at 0122    XR Chest 1 View    Result Date: 11/24/2023  Electronically signed by Yuki Starr MD on 11-24-23 at 0100     Ambulatory status:   - w assistance x 2     Social issues:   Social History     Socioeconomic History    Marital status:    Tobacco Use    Smoking status: Former     Packs/day: 1.00     Years: 20.00     Additional pack years: 0.00     Total pack years: 20.00     Types: Cigarettes     Passive  "exposure: Past    Smokeless tobacco: Never    Tobacco comments:     30  years ago   Vaping Use    Vaping Use: Never used   Substance and Sexual Activity    Alcohol use: Yes     Comment: \"rare\"    Drug use: Never    Sexual activity: Defer       NIH Stroke Scale:        Nursing report ED to floor:     Electronically signed by Sturgeon, Autumn, RN at 11/24/23 0316       Alvarez Sanches MD at 11/24/23 0309           EMERGENCY DEPARTMENT ENCOUNTER    Room Number:  16/16  PCP: Brett Jackson MD  History obtained from: Patient      HPI:  Chief Complaint: Nausea and vomiting  A complete HPI/ROS/PMH/PSH/SH/FH are unobtainable due to: Not applicable  Context: Catherine Boyer is a 83 y.o. female who presents to the ED c/o nausea and vomiting.  Started today.  No other recent illness, fever, chills.  Just discharged from the hospital yesterday.  No abdominal pain.  No chest pain.            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 deficits 09/04/2022    Porcelain gallbladder     Breast cancer     Atherosclerosis of abdominal aorta 02/05/2023    APC (atrial premature contractions) 02/24/2023    Moderate malnutrition 05/05/2023    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 05/05/2023    Elevated troponin 09/27/2023    TIA (transient ischemic attack) 09/27/2023    PUD (peptic ulcer disease) 09/27/2023    Anemia 09/28/2023    Tachycardia 10/18/2023    Myelofibrosis 10/18/2023    " Normocytic anemia 10/18/2023    Acute kidney injury 11/02/2023    Pulmonary infiltrate 11/02/2023    Iatrogenic pneumothorax 11/09/2023     Resolved Ambulatory Problems     Diagnosis Date Noted    Chest pain with high risk for cardiac etiology 08/31/2022    COVID 10/29/2022    Chronic diastolic heart failure     Acute UTI (urinary tract infection) 11/21/2022    Cholecystitis 11/22/2022    Urinary tract infection without hematuria, site unspecified 01/23/2023    Epigastric abdominal pain 01/24/2023    Gastritis 01/26/2023    ABLA (acute blood loss anemia) 02/04/2023    Hypokalemia 02/07/2023    Rotavirus enteritis 05/04/2023    KARLI (acute kidney injury) 05/04/2023    Acute renal failure, unspecified acute renal failure type 05/04/2023    KARLI (acute kidney injury) 08/13/2023    Intractable nausea and vomiting 08/13/2023    Dyspnea 09/27/2023    Acute on chronic heart failure with preserved ejection fraction (HFpEF) 10/02/2023     Past Medical History:   Diagnosis Date    Atrial fibrillation     Carotid atherosclerosis     Chronic diastolic (congestive) heart failure     Glaucoma     History of cataract     Type 2 diabetes mellitus     Upper GI bleed          PAST SURGICAL HISTORY  Past Surgical History:   Procedure Laterality Date    BREAST LUMPECTOMY  1999    BRONCHOSCOPY Bilateral 11/8/2023    Procedure: BRONCHOSCOPY UNDER FLUORO WITH BAL,  BIOPSIES; ACETYLCYSTEIN 4ML GIVEN;  Surgeon: Raoul Alford MD;  Location: [facility] ENDOSCOPY;  Service: Pulmonary;  Laterality: Bilateral;  PRE- PULMONARY INFILTRATES  POST- SAME    CARDIAC CATHETERIZATION N/A 09/02/2022    Procedure: Coronary angiography;  Surgeon: Guero Verde MD;  Location: [facility] CATH INVASIVE LOCATION;  Service: Cardiovascular;  Laterality: N/A;    CARDIAC CATHETERIZATION N/A 09/02/2022    Procedure: Stent LEFTY coronary;  Surgeon: Guero Verde MD;  Location: [facility] CATH INVASIVE LOCATION;  Service: Cardiovascular;  Laterality: N/A;    CATARACT  "EXTRACTION  2011    CHOLECYSTECTOMY      CHOLECYSTECTOMY WITH INTRAOPERATIVE CHOLANGIOGRAM N/A 11/28/2022    Procedure: CHOLECYSTECTOMY LAPAROSCOPIC INTRAOPERATIVE CHOLANGIOGRAM;  Surgeon: Dani Grover Jr., MD;  Location: [facility] MAIN OR;  Service: General;  Laterality: N/A;    COLONOSCOPY N/A 02/09/2023    Procedure: COLONOSCOPY TO CECUM & T.I.;  Surgeon: Guero Ferris MD;  Location: [facility] ENDOSCOPY;  Service: Gastroenterology;  Laterality: N/A;  PRE- MELENA, GI BLEED  POST- DIVERTICULOSIS, INT HEMORRHOIDS    ENDOSCOPY N/A 01/25/2023    Procedure: ESOPHAGOGASTRODUODENOSCOPY WITH BIOPSIES;  Surgeon: Charles Diaz MD;  Location[facility] ENDOSCOPY;  Service: Gastroenterology;  Laterality: N/A;  pre: abd pain, nausea  post: hiatal hernia, mild gastritis, sloughing of the esophagus    ENTEROSCOPY SMALL BOWEL N/A 02/09/2023    Procedure: ENTEROSCOPY SMALL BOWEL;  Surgeon: Guero Ferris MD;  Location: [facility] ENDOSCOPY;  Service: Gastroenterology;  Laterality: N/A;  PRE- MELENA, GI BLEED  POST- HIATAL HERNIA    JOINT REPLACEMENT      UPPER GASTROINTESTINAL ENDOSCOPY           FAMILY HISTORY  Family History   Problem Relation Age of Onset    Ovarian cancer Mother     Lung cancer Father     Lung cancer Sister     Malig Hyperthermia Neg Hx          SOCIAL HISTORY  Social History     Socioeconomic History    Marital status:    Tobacco Use    Smoking status: Former     Packs/day: 1.00     Years: 20.00     Additional pack years: 0.00     Total pack years: 20.00     Types: Cigarettes     Passive exposure: Past    Smokeless tobacco: Never    Tobacco comments:     30  years ago   Vaping Use    Vaping Use: Never used   Substance and Sexual Activity    Alcohol use: Yes     Comment: \"rare\"    Drug use: Never    Sexual activity: Defer         ALLERGIES  Aspirin, Diphenhydramine hcl, Hydroxyurea, and Oxycodone        REVIEW OF SYSTEMS    As per HPI      PHYSICAL EXAM  ED Triage Vitals [11/23/23 2312]   Temp Heart Rate " Resp BP SpO2   96.9 °F (36.1 °C) 56 18 127/64 94 %      Temp src Heart Rate Source Patient Position BP Location FiO2 (%)   -- -- -- -- --       Physical Exam  Constitutional:       General: She is not in acute distress.     Appearance: She is ill-appearing.   HENT:      Head: Normocephalic and atraumatic.   Cardiovascular:      Rate and Rhythm: Regular rhythm. Tachycardia present.   Pulmonary:      Effort: Pulmonary effort is normal. No respiratory distress.   Abdominal:      General: There is no distension.      Palpations: Abdomen is soft.      Tenderness: There is no abdominal tenderness.   Musculoskeletal:         General: No swelling or deformity.   Skin:     General: Skin is warm and dry.   Neurological:      Mental Status: She is alert. Mental status is at baseline.           Vital signs and nursing notes reviewed.          LAB RESULTS  Recent Results (from the past 24 hour(s))   ECG 12 Lead Electrolyte Imbalance    Collection Time: 11/23/23 11:47 PM   Result Value Ref Range    QT Interval 393 ms    QTC Interval 530 ms   Comprehensive Metabolic Panel    Collection Time: 11/24/23 12:02 AM    Specimen: Blood   Result Value Ref Range    Glucose 210 (H) 65 - 99 mg/dL    BUN 28 (H) 8 - 23 mg/dL    Creatinine 0.87 0.57 - 1.00 mg/dL    Sodium 135 (L) 136 - 145 mmol/L    Potassium 4.4 3.5 - 5.2 mmol/L    Chloride 93 (L) 98 - 107 mmol/L    CO2 26.2 22.0 - 29.0 mmol/L    Calcium 8.7 8.6 - 10.5 mg/dL    Total Protein 6.2 6.0 - 8.5 g/dL    Albumin 3.7 3.5 - 5.2 g/dL    ALT (SGPT) 24 1 - 33 U/L    AST (SGOT) 28 1 - 32 U/L    Alkaline Phosphatase 209 (H) 39 - 117 U/L    Total Bilirubin 1.0 0.0 - 1.2 mg/dL    Globulin 2.5 gm/dL    A/G Ratio 1.5 g/dL    BUN/Creatinine Ratio 32.2 (H) 7.0 - 25.0    Anion Gap 15.8 (H) 5.0 - 15.0 mmol/L    eGFR 65.4 >60.0 mL/min/1.73   Lipase    Collection Time: 11/24/23 12:02 AM    Specimen: Blood   Result Value Ref Range    Lipase 7 (L) 13 - 60 U/L   Single High Sensitivity Troponin T     Collection Time: 11/24/23 12:02 AM    Specimen: Blood   Result Value Ref Range    HS Troponin T 33 (H) <14 ng/L   Magnesium    Collection Time: 11/24/23 12:02 AM    Specimen: Blood   Result Value Ref Range    Magnesium 1.7 1.6 - 2.4 mg/dL   CBC Auto Differential    Collection Time: 11/24/23 12:02 AM    Specimen: Blood   Result Value Ref Range    WBC 63.14 (C) 3.40 - 10.80 10*3/mm3    RBC 3.13 (L) 3.77 - 5.28 10*6/mm3    Hemoglobin 9.4 (L) 12.0 - 15.9 g/dL    Hematocrit 29.3 (L) 34.0 - 46.6 %    MCV 93.6 79.0 - 97.0 fL    MCH 30.0 26.6 - 33.0 pg    MCHC 32.1 31.5 - 35.7 g/dL    RDW 19.4 (H) 12.3 - 15.4 %    RDW-SD 64.6 (H) 37.0 - 54.0 fl    MPV 12.7 (H) 6.0 - 12.0 fL    Platelets 232 140 - 450 10*3/mm3   Manual Differential    Collection Time: 11/24/23 12:02 AM    Specimen: Blood   Result Value Ref Range    Neutrophil % 85.0 (H) 42.7 - 76.0 %    Lymphocyte % 1.0 (L) 19.6 - 45.3 %    Monocyte % 8.0 5.0 - 12.0 %    Eosinophil % 1.0 0.3 - 6.2 %    Bands %       Metamyelocyte % 1.0 (H) 0.0 - 0.0 %    Myelocyte % 4.0 (H) 0.0 - 0.0 %    Neutrophils Absolute 53.67 (H) 1.70 - 7.00 10*3/mm3    Lymphocytes Absolute 0.63 (L) 0.70 - 3.10 10*3/mm3    Monocytes Absolute 5.05 (H) 0.10 - 0.90 10*3/mm3    Eosinophils Absolute 0.63 (H) 0.00 - 0.40 10*3/mm3    Anisocytosis Slight/1+ None Seen    Poikilocytes Slight/1+ None Seen    WBC Morphology Normal Normal    Platelet Morphology Normal Normal   Procalcitonin    Collection Time: 11/24/23 12:02 AM    Specimen: Blood   Result Value Ref Range    Procalcitonin 0.25 0.00 - 0.25 ng/mL   BNP    Collection Time: 11/24/23 12:02 AM    Specimen: Blood   Result Value Ref Range    proBNP 3,717.0 (H) 0.0 - 1,800.0 pg/mL   Urinalysis With Microscopic If Indicated (No Culture) - Urine, Clean Catch    Collection Time: 11/24/23  2:01 AM    Specimen: Urine, Clean Catch   Result Value Ref Range    Color, UA Yellow Yellow, Straw    Appearance, UA Clear Clear    pH, UA 7.0 5.0 - 8.0    Specific Gravity,  UA 1.015 1.005 - 1.030    Glucose, UA Negative Negative    Ketones, UA Negative Negative    Bilirubin, UA Negative Negative    Blood, UA Negative Negative    Protein, UA Negative Negative    Leuk Esterase, UA Negative Negative    Nitrite, UA Negative Negative    Urobilinogen, UA 0.2 E.U./dL 0.2 - 1.0 E.U./dL       Ordered the above labs and reviewed the results.        RADIOLOGY  CT Angiogram Abdomen Pelvis    Result Date: 11/24/2023  Patient: MARIBELL RUGGIERO  Time Out: 01:24 Exam(s): CTA ABDOMEN + PELVIS EXAM:   CT Angiography Abdomen and Pelvis With Intravenous Contrast CLINICAL HISTORY:      Concern for acute aortic syndrome. TECHNIQUE:   Axial computed tomographic angiography images of the abdomen and pelvis with intravenous contrast.  CTDI is 37 mGy and DLP is 934 mGy-cm.  This CT exam was performed according to the principle of ALARA (As Low As Reasonably Achievable) by using one or more of the following dose reduction techniques: automated exposure control, adjustment of the mA and or kV according to patient size, and or use of iterative reconstruction technique.   MIP reconstructed images were created and reviewed. COMPARISON:   No relevant prior studies available. FINDINGS:  VASCULATURE:   Aorta:  No acute findings.  No aortic aneurysm or dissection.   Celiac trunk and mesenteric arteries:  Mild atherosclerosis of the origin of the celiac trunk, SMA and BETZY without significant stenosis.   Renal arteries:  No acute findings.  No occlusion or significant stenosis.   Iliac arteries:  Moderate atherosclerosis of the bilateral common iliac arteries, external iliac arteries and internal iliac arteries.  No occlusion or significant stenosis.   Lung bases:  Unremarkable.  No mass.  No consolidation.  ABDOMEN:   Liver:  Nodular contour of the liver is concerning for cirrhosis.   Gallbladder and bile ducts:  Cholecystectomy.  No ductal dilation.   Pancreas:  Unremarkable.  No ductal dilation.  No mass.   Spleen:   Splenomegaly.  The spleen is heterogeneous which could represent a perfusion anomaly.   Adrenals:  Unremarkable.  No mass.   Kidneys and ureters:  Unremarkable.  No hydronephrosis.  No solid mass.   Stomach and bowel:  Diverticulosis.  No obstruction.  No mucosal thickening.  PELVIS:   Appendix:  No findings to suggest acute appendicitis.   Bladder:  Unremarkable.  No mass.   Reproductive:  Unremarkable as visualized.  ABDOMEN and PELVIS:   Intraperitoneal space:  Unremarkable.  No significant fluid collection.  No free air.   Bones joints:  There are degenerative changes of the spine.  No acute fracture.  No dislocation.   Soft tissues:  Unremarkable.   Lymph nodes:  Unremarkable.  No enlarged lymph nodes. IMPRESSION:     1.  No aortic aneurysm or dissection. 2.  Nodular contour of the liver is concerning for cirrhosis. 3.  The spleen is heterogeneous which could represent a perfusion anomaly. 4.  Splenomegaly is nonspecific but concerning for portal hypertension. 5.  Diverticulosis.     Electronically signed by Yuki Starr MD on 11-24-23 at 0124    CT Angiogram Chest    Result Date: 11/24/2023  Patient: MARIBELL RUGGIERO  Time Out: 01:22 Exam(s): CTA CHEST EXAM:   CT Angiography Chest With Intravenous Contrast CLINICAL HISTORY:      Concern for acute aortic syndrome. TECHNIQUE:   Axial computed tomographic angiography images of the chest with intravenous contrast.  CTDI is 37 mGy and DLP is 934 mGy-cm.  This CT exam was performed according to the principle of ALARA (As Low As Reasonably Achievable) by using one or more of the following dose reduction techniques: automated exposure control, adjustment of the mA and or kV according to patient size, and or use of iterative reconstruction technique.   MIP reconstructed images were created and reviewed. COMPARISON:   No relevant prior studies available. FINDINGS:   Pulmonary arteries:  Unremarkable.  No pulmonary embolus.   Aorta:  Mild atherosclerosis.  No aortic  aneurysm or dissection.   Lungs:  Diffuse bilateral airspace opacities are concerning for multifocal pneumonia and or aspiration.  Cannot exclude superimposed pulmonary edema.   Pleural space:  Unremarkable.  No significant effusion.  No pneumothorax.   Heart:  Unremarkable.  No cardiomegaly.  No significant pericardial effusion.  No evidence of RV dysfunction.   Bones joints:  There are degenerative changes of the spine.  No acute fracture.   Soft tissues:  Unremarkable.   Lymph nodes:  Unremarkable.  No enlarged lymph nodes. IMPRESSION:     1.  No aortic aneurysm or dissection. 2.  No pulmonary embolus. 3.  Diffuse bilateral airspace opacities are concerning for multifocal pneumonia and or aspiration.  Cannot exclude superimposed pulmonary edema.     Electronically signed by Yuki Starr MD on 11-24-23 at 0122    XR Chest 1 View    Result Date: 11/24/2023  Patient: MARIBELL RUGGIERO  Time Out: 01:00 Exam(s): XR CXR 1 VIEW EXAM:   XR Chest, 1 View CLINICAL HISTORY:      Nausea and upper abd pain. TECHNIQUE:   Frontal view of the chest. COMPARISON:   Chest radiograph 11 20 2023 FINDINGS:   Lungs:  Significantly worsened bilateral airspace opacities.   Pleural space:  New small left pleural effusion.  No pneumothorax.   Heart:  Unremarkable.  No cardiomegaly.   Mediastinum:  Unremarkable.  Normal mediastinal contour.   Bones joints:  There are degenerative changes of the spine.  No acute fracture. IMPRESSION:     1.  Significantly worsened bilateral airspace opacities. 2.  New small left pleural effusion.     Electronically signed by Yuki Starr MD on 11-24-23 at 0100     Ordered the above noted radiological studies. Reviewed by me in PACS.                MEDICATIONS GIVEN IN ER  Medications   ampicillin-sulbactam (UNASYN) 3 g in sodium chloride 0.9 % 100 mL IVPB-VTB (3 g Intravenous New Bag 11/24/23 0253)   azithromycin (ZITHROMAX) 500 mg in sodium chloride 0.9 % 250 mL IVPB-VTB (500 mg Intravenous New Bag  11/24/23 0252)   sodium chloride 0.9 % flush 10 mL (has no administration in time range)   sennosides-docusate (PERICOLACE) 8.6-50 MG per tablet 2 tablet (has no administration in time range)     And   polyethylene glycol (MIRALAX) packet 17 g (has no administration in time range)     And   bisacodyl (DULCOLAX) EC tablet 5 mg (has no administration in time range)     And   bisacodyl (DULCOLAX) suppository 10 mg (has no administration in time range)   nitroglycerin (NITROSTAT) SL tablet 0.4 mg (has no administration in time range)   acetaminophen (TYLENOL) tablet 650 mg (has no administration in time range)   melatonin tablet 3 mg (has no administration in time range)   ondansetron (ZOFRAN) tablet 4 mg (has no administration in time range)     Or   ondansetron (ZOFRAN) injection 4 mg (has no administration in time range)   ondansetron (ZOFRAN) injection 4 mg (4 mg Intravenous Given 11/24/23 0026)   iopamidol (ISOVUE-370) 76 % injection 100 mL (95 mL Intravenous Given 11/24/23 0033)   furosemide (LASIX) injection 40 mg (40 mg Intravenous Given 11/24/23 0114)   prochlorperazine (COMPAZINE) injection 10 mg (10 mg Intravenous Given 11/24/23 0226)               MEDICAL DECISION MAKING, PROGRESS, and CONSULTS    MDM: Patient presented the emergency department with nausea and vomiting, recently admitted for pneumonia and urinary tract infection.  Tachycardic on arrival, ill-appearing.  Labs significant for chronic leukocytosis, elevated BNP.  Otherwise labs reassuring.  Kidney function appears to be at baseline.  Chest imaging consistent with pulmonary edema versus pneumonia.  Treated broad-spectrum antibiotics.  Discussed with inpatient team, given patient's possible new infection with slightly increased oxygen requirement will admit for further management evaluation.    All labs have been independently reviewed by me.  All radiology studies have been reviewed by me and I have also reviewed the radiology report.   EKG's  independently viewed and interpreted by me.  Discussion below represents my analysis of pertinent findings related to patient's condition, differential diagnosis, treatment plan and final disposition.      Additional sources:  - Discussed/ obtained information from independent historians:      - External (non-ED) record review: Reviewed medical record, patient was just discharged, had some issues with A-fib with RVR while in the hospital that was improved at discharge.  Discharged on 3 L nasal cannula.    - Chronic or social conditions impacting care: Myeloproliferative disorder    - Shared decision making: Discussed plan for admission, patient agrees.      Orders placed during this visit:  Orders Placed This Encounter   Procedures    Blood Culture - Blood,    Blood Culture - Blood,    Respiratory Panel PCR w/COVID-19(SARS-CoV-2) [facility]-House, NP Swab in UTM/VTM, 2 HR TAT - Swab, Nasopharynx    XR Chest 1 View    CT Angiogram Chest    CT Angiogram Abdomen Pelvis    Comprehensive Metabolic Panel    Lipase    Urinalysis With Microscopic If Indicated (No Culture) - Urine, Clean Catch    Single High Sensitivity Troponin T    Magnesium    CBC Auto Differential    Manual Differential    Lactic Acid, Plasma    Procalcitonin    BNP    Comprehensive Metabolic Panel    CBC (No Diff)    Diet: Cardiac Diets, Diabetic Diets; Healthy Heart (2-3 Na+); Consistent Carbohydrate; Texture: Regular Texture (IDDSI 7); Fluid Consistency: Thin (IDDSI 0)    Vital Signs    Oral Care    Place Sequential Compression Device    Maintain Sequential Compression Device    Telemetry - Maintain IV Access    Telemetry - Place Orders & Notify Provider of Results When Patient Experiences Acute Chest Pain, Dysrhythmia or Respiratory Distress    May Be Off Telemetry for Tests    Up With Assistance    Daily Weights    Strict Intake & Output    Code Status and Medical Interventions:    MARGARITA (on-call MD unless specified) Details     Incentive Spirometry    ECG 12 Lead Electrolyte Imbalance    Inpatient Admission    CBC & Differential         Additional orders considered but not ordered:          Differential diagnosis includes but is not limited to:    Pneumonia, sepsis, intra-abdominal infection, aortic dissection, pulmonary embolism, urinary tract infection      Independent interpretation of labs, radiology studies, and discussions with consultants:  ED Course as of 11/24/23 0619   u Nov 23, 2023   2341 Chest x-ray interpreted myself:  Bilateral increased interstitial lung markings with hazy borders of the aortic arch, infiltrates are somewhat improved from prior x-ray [FS]   Fri Nov 24, 2023   0002 EKG interpreted myself:  2347, sinus tachycardia with frequent PVCs/PACs, T wave inversion in aVL, no other ST segment changes or T wave inversions. [FS]   0019 WBC(!!): 63.14 [FS]   0035 HS Troponin T(!): 33 [FS]   0035 BUN(!): 28 [FS]   0055 CTA chest interpreted myself:  No acute vascular abnormality, pulmonary edema bilaterally vs. pneumonia [FS]   0138 Heart Rate: 106 [FS]   0329 Lactate(!!): 5.2 [FS]      ED Course User Index  [FS] Alvarez Sanches MD           DIAGNOSIS  Final diagnoses:   Community acquired pneumonia, unspecified laterality   Sepsis without acute organ dysfunction, due to unspecified organism   Hypoxia         DISPOSITION  Admitted to [facility] telemetry        Latest Documented Vital Signs:  As of 03:09 EST  BP- 104/53 HR- 80 Temp- 96.9 °F (36.1 °C) O2 sat- 93%              --    Please note that portions of this were completed with a voice recognition program.       Note Disclaimer: At [facility], we believe that sharing information builds trust and better relationships. You are receiving this note because you are receiving care at [facility] or recently visited. It is possible you will see health information before a provider has talked with you about it. This kind of information can be easy to misunderstand. To  help you fully understand what it means for your health, we urge you to discuss this note with your provider.             Alvarez Sanches MD  11/24/23 0312       Alvarez Sanches MD  11/24/23 0619      Electronically signed by Alvarez Sanches MD at 11/24/23 0619       Sarah Bennett, RN at 11/23/23 2308          abd pain and N/V since 1200 today. Pt discharged this morning from [facility] with diagnosis of diverticulitis. Pt new resident at her nursing facility arriving this morning.   Pt from [facility].     Electronically signed by Sarah Bennett, RN at 11/23/23 2313       Oxygen Therapy (since admission)       Date/Time SpO2 Device (Oxygen Therapy) Flow (L/min) Oxygen Concentration (%) ETCO2 (mmHg)    11/24/23 1409 -- nasal cannula 2 -- --    11/24/23 1233 96 nasal cannula 2 -- --    11/24/23 1207 72 -- -- -- --    11/24/23 1131 94 -- 2 -- --    11/24/23 0917 -- nasal cannula 2 -- --    11/24/23 0655 92 nasal cannula 2 -- --    11/24/23 0514 -- nasal cannula 2 -- --    11/24/23 0445 92 nasal cannula 2 -- --    11/24/23 0335 98 -- -- -- --    11/24/23 0334 99 -- -- -- --    11/24/23 0333 99 -- -- -- --    11/24/23 0332 99 -- -- -- --    11/24/23 0316 94 -- -- -- --    11/24/23 0315 93 -- -- -- --    11/24/23 0314 93 -- -- -- --    11/24/23 0311 93 -- -- -- --    11/24/23 0221 93 -- -- -- --    11/24/23 0203 94 -- -- -- --    11/24/23 0104 97 -- -- -- --    11/24/23 0101 94 -- -- -- --    11/24/23 0021 100 -- -- -- --    11/24/23 0020 100 -- -- -- --    11/24/23 0019 100 -- -- -- --    11/24/23 0018 100 -- -- -- --    11/24/23 0002 95 -- -- -- --    11/24/23 0001 93 -- -- -- --    11/24/23 0000 94 -- -- -- --    11/23/23 2359 94 -- -- -- --    11/23/23 2358 94 -- -- -- --    11/23/23 2357 93 -- -- -- --    11/23/23 2312 94 nasal cannula 3 -- --          Intake & Output (last 2 days)         11/22 0701 11/23 0700 11/23 0701 11/24 0700 11/24 0701 11/25 0700    P.O.  240 120    Total Intake(mL/kg)  240 (3.9) 120  (2)    Net  +240 +120           Emesis Unmeasured Occurrence  1 x           Lines, Drains & Airways       Active LDAs       Name Placement date Placement time Site Days    Peripheral IV 11/24/23 0026 Left;Posterior Forearm 11/24/23  0026  Forearm  less than 1              Inactive LDAs       Name Placement date Placement time Removal date Removal time Site Days    [REMOVED] Peripheral IV 11/13/23 0636 Right Forearm 11/13/23  0636  11/22/23  1248  Forearm  9                  Operative/Procedure Notes (last 48 hours)  Notes from 11/22/23 1438 through 11/24/23 1438   No notes of this type exist for this encounter.       Physician Progress Notes (all)    No notes of this type exist for this encounter.          Consult Notes (all)        Ramirez Oliva MD at 11/24/23 1159        Consult Orders    1. Hematology & Oncology Inpatient Consult [[id number]] ordered by Junior Doan MD at 11/24/23 1010                   Subjective     REASON FOR CONSULTATION:    Evaluation and management for primary myelofibrosis                             REQUESTING PHYSICIAN:  Dr. Franki MD    RECORDS OBTAINED:  Records of the patients history including those obtained from the referring provider were reviewed and summarized in detail.    HISTORY OF PRESENT ILLNESS:  The patient is a 83 y.o. year old female with medical history significant for JAK2 positive myeloproliferative disorder, perhaps primary myelofibrosis, A-fib on chronic Eliquis, CAD with stents, history of endocarditis and TIA had presented to [facility] ER on 11/23/2023 with nausea and vomiting.  Patient was discharged just a day before after prolonged hospitalization for ESBL UTI, acute renal failure and respiratory distress.    Patient states that she initially reached the rehab facility, she started to feel bad.  She developed nausea and vomiting and this was brought back to the hospital.  Work-up in the ER with CT scans did not show any acute abnormalities.   Persistent bilateral pulmonary opacities, concerning for multifocal pneumonia and/or aspiration.    Hematology/oncology has been consulted for further evaluation and management.  Patient is well-known to our service and follows up with Dr. Pate for primary myelofibrosis management.  She was recently started on Ojjara for her disease.    Past Medical History:   Diagnosis Date    Atherosclerosis of abdominal aorta 02/05/2023    Atrial fibrillation     Breast cancer     CAD (coronary artery disease)     NSTEMI 2/2022: 90% ostial LAD, 99% D1, 70% mid-distal LAD (medical therapy). She received two stents (2.5x18 and 2.5x26mm [place] LEFTY) but I don't know which one went to which lesion.    Carotid atherosclerosis     Cholecystitis 11/22/2022    Added automatically from request for surgery [id number]    Chronic diastolic (congestive) heart failure     COVID 10/29/2022    GERD (gastroesophageal reflux disease)     Glaucoma     History of cataract     Hypertension     Microcytic anemia     per Dr. oleg pate office  note 6/30/22-dd    Myeloproliferative disorder     JAK2 positive    Nonbacterial thrombotic endocarditis     6/2021: 4x5mm vegetation on the ventricular surface of the anterior MV, negative blood cultures    Porcelain gallbladder     PUD (peptic ulcer disease)     TIA (transient ischemic attack)     Type 2 diabetes mellitus     Type 2 diabetes mellitus with circulatory disorder, without long-term current use of insulin 07/14/2022    Upper GI bleed         Past Surgical History:   Procedure Laterality Date    BREAST LUMPECTOMY  1999    BRONCHOSCOPY Bilateral 11/8/2023    Procedure: BRONCHOSCOPY UNDER FLUORO WITH BAL,  BIOPSIES; ACETYLCYSTEIN 4ML GIVEN;  Surgeon: Raoul Alford MD;  Location:  [facility];  Service: Pulmonary;  Laterality: Bilateral;  PRE- PULMONARY INFILTRATES  POST- SAME    CARDIAC CATHETERIZATION N/A 09/02/2022    Procedure: Coronary angiography;  Surgeon: Guero Verde MD;  Location:   [facility];  Service: Cardiovascular;  Laterality: N/A;    CARDIAC CATHETERIZATION N/A 09/02/2022    Procedure: Stent LEFTY coronary;  Surgeon: Guero Verde MD;  Location:  [facility];  Service: Cardiovascular;  Laterality: N/A;    CATARACT EXTRACTION  2011    CHOLECYSTECTOMY      CHOLECYSTECTOMY WITH INTRAOPERATIVE CHOLANGIOGRAM N/A 11/28/2022    Procedure: CHOLECYSTECTOMY LAPAROSCOPIC INTRAOPERATIVE CHOLANGIOGRAM;  Surgeon: Dani Grover Jr., MD;  Location: [facility] MAIN OR;  Service: General;  Laterality: N/A;    COLONOSCOPY N/A 02/09/2023    Procedure: COLONOSCOPY TO CECUM & T.I.;  Surgeon: Guero Ferris MD;  [facility];  Service: Gastroenterology;  Laterality: N/A;  PRE- MELENA, GI BLEED  POST- DIVERTICULOSIS, INT HEMORRHOIDS    ENDOSCOPY N/A 01/25/2023    Procedure: ESOPHAGOGASTRODUODENOSCOPY WITH BIOPSIES;  Surgeon: Charles Diaz MD;  [facility];  Service: Gastroenterology;  Laterality: N/A;  pre: abd pain, nausea  post: hiatal hernia, mild gastritis, sloughing of the esophagus    ENTEROSCOPY SMALL BOWEL N/A 02/09/2023    Procedure: ENTEROSCOPY SMALL BOWEL;  Surgeon: Guero Ferris MD;  Location: [facility] ENDOSCOPY;  Service: Gastroenterology;  Laterality: N/A;  PRE- MELENA, GI BLEED  POST- HIATAL HERNIA    JOINT REPLACEMENT      UPPER GASTROINTESTINAL ENDOSCOPY          No current facility-administered medications on file prior to encounter.     Current Outpatient Medications on File Prior to Encounter   Medication Sig Dispense Refill    acetaminophen (TYLENOL) 500 MG tablet Take 1 tablet by mouth As Needed.      apixaban (ELIQUIS) 5 MG tablet tablet Take 1 tablet by mouth 2 (Two) Times a Day. 180 tablet 3    aspirin 81 MG EC tablet Take 1 tablet by mouth Daily.      bisacodyl (DULCOLAX) 10 MG suppository Insert 1 suppository into the rectum Daily As Needed for Constipation (Use if bisacodyl oral is ineffective).       budesonide-formoterol (SYMBICORT) 160-4.5 MCG/ACT inhaler Inhale 2 puffs 2 (Two) Times a Day.      cadexomer iodine (IODOSORB) 0.9 % gel Apply 1 application  topically to the appropriate area as directed Daily.      famotidine (PEPCID) 20 MG tablet Take 1 tablet by mouth Daily.      furosemide (LASIX) 20 MG tablet Take 0.5 tablets by mouth Daily.      latanoprost (XALATAN) 0.005 % ophthalmic solution Administer 1 drop to both eyes.      metoprolol succinate XL (TOPROL-XL) 25 MG 24 hr tablet Take 1 tablet by mouth Daily. 90 tablet 2    Momelotinib Dihydrochloride (OJJAARA) 200 MG tablet Take 1 tablet by mouth Daily. 28 tablet 3    polyethylene glycol (MIRALAX) 17 g packet Take 17 g by mouth Daily As Needed (Use if senna-docusate is ineffective).      predniSONE (DELTASONE) 5 MG tablet Take 4 tablets by mouth Daily With Breakfast for 6 days, THEN 3 tablets Daily With Breakfast for 7 days, THEN 2 tablets Daily With Breakfast for 7 days, THEN 1 tablet Daily With Breakfast for 7 days. 66 tablet 0    saccharomyces boulardii (FLORASTOR) 250 MG capsule Take 1 capsule by mouth 2 (Two) Times a Day. 60 capsule 0    sertraline (ZOLOFT) 100 MG tablet Take 1 tablet by mouth Daily.      simethicone (MYLICON) 80 MG chewable tablet Chew 1 tablet by mouth 4 (Four) Times a Day As Needed for Flatulence. 100 tablet 0    tiotropium bromide monohydrate (SPIRIVA RESPIMAT) 2.5 MCG/ACT aerosol solution inhaler Inhale 2 puffs Daily.          ALLERGIES:    Allergies   Allergen Reactions    Aspirin Unknown - Low Severity    Diphenhydramine Hcl Anxiety     Benadryl IVP    Hydroxyurea Other (See Comments)     Her hemoglobin drop and was stop in February 2022 and start taking Jakafi    Oxycodone Unknown - Low Severity        Social History     Socioeconomic History    Marital status:    Tobacco Use    Smoking status: Former     Packs/day: 1.00     Years: 20.00     Additional pack years: 0.00     Total pack years: 20.00     Types: Cigarettes  "    Quit date: 1950     Years since quittin.0     Passive exposure: Past    Smokeless tobacco: Never    Tobacco comments:     30  years ago   Vaping Use    Vaping Use: Never used   Substance and Sexual Activity    Alcohol use: Not Currently     Comment: \"rare\"    Drug use: Never    Sexual activity: Defer        Family History   Problem Relation Age of Onset    Ovarian cancer Mother     Lung cancer Father     Lung cancer Sister     Malig Hyperthermia Neg Hx         Review of Systems   As per HPI    Objective     Vitals:    23 0335 23 0445 23 0655 23 1131   BP:  124/72 115/67 118/62   BP Location:  Right arm Right arm Right arm   Patient Position:  Lying Lying Lying   Pulse: 113 105 59 106   Resp:   18   Temp:  97.7 °F (36.5 °C) 97.9 °F (36.6 °C) 97.5 °F (36.4 °C)   TempSrc:  Oral Oral    SpO2: 98% 92% 92% 94%   Weight:  61.5 kg (135 lb 9.3 oz)     Height:             10/18/2023     4:02 PM   Current Status   ECOG score 2       Physical Exam    CONSTITUTIONAL:  Vital signs reviewed.  No distress, looks comfortable.  EYES:  Conjunctiva and lids unremarkable.    EARS,NOSE,MOUTH,THROAT:  Ears and nose appear unremarkable.    RESPIRATORY:  Normal respiratory effort.  On 2-3 L nasal cannula oxygen  CARDIOVASCULAR:  Normal heart rate.  GASTROINTESTINAL: Abdomen appears unremarkable.  Nondistended   LYMPHATIC:  No cervical, supraclavicular lymphadenopathy.  SKIN:  Warm.  No rashes.  PSYCHIATRIC:  Normal judgment and insight.  Normal mood and affect.  NEURO: AAOx3, no obvious focal deficits.      RECENT LABS:  Hematology WBC   Date Value Ref Range Status   2023 83.18 (C) 3.40 - 10.80 10*3/mm3 Final     RBC   Date Value Ref Range Status   2023 3.40 (L) 3.77 - 5.28 10*6/mm3 Final     Hemoglobin   Date Value Ref Range Status   2023 10.1 (L) 12.0 - 15.9 g/dL Final     Hematocrit   Date Value Ref Range Status   2023 31.4 (L) 34.0 - 46.6 % Final     Platelets   Date " Value Ref Range Status   11/24/2023 298 140 - 450 10*3/mm3 Final          Assessment & Plan   Patient is a pleasant 83-year-old female with medical history significant for JAK2 positive myeloproliferative disorder, perhaps primary myelofibrosis, A-fib on chronic Eliquis, CAD with stents, history of endocarditis and TIA admitted with nausea and vomiting.      #Nausea and vomiting due to suspected sepsis and pneumonia related:  Patient had presented to [facility] ER on 11/23/2023 with nausea and vomiting.  Patient was discharged just a day before after prolonged hospitalization for ESBL UTI, acute renal failure and respiratory distress.  Patient states that she initially reached the rehab facility, she started to feel bad.  She developed nausea and vomiting and this was brought back to the hospital.  Work-up in the ER with CT scans did not show any acute abnormalities.  Persistent bilateral pulmonary opacities, concerning for multifocal pneumonia and/or aspiration.  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  This is likely primary myelofibrosis +/-steroids and infection related  Will hold Ojjara while inpatient as it can be associated with nausea and vomiting    #Leukocytosis: As above    #Liver cirrhosis and splenomegaly    #Chronic hypoxic respiratory failure:  She was evaluated with bronchoscopy with biopsies on 11/8/2023.  No evidence of malignancy.  Continue with gradual steroids taper  Antibiotics as above    #History of A-fib on Eliquis    Recommendations:  -Hold Ojjara while inpatient.  -Continue antibiotics and IV fluids as per primary  -Continue gradual taper of steroids  -Will follow in periphery.    She has an appointment scheduled with our office on 12/20/2023.  Please call us with any  questions    Electronically signed by Ramirez Oliva MD at 11/24/23 7550

## 2023-11-24 NOTE — CASE MANAGEMENT/SOCIAL WORK
Discharge Planning Assessment  [facility]     Patient Name: Catherine Boyer  MRN: [id number]  Today's Date: 11/24/2023    Admit Date: 11/23/2023    Plan: Return to [facility], will need ambulance/stretcher transportation   Discharge Needs Assessment       Row Name 11/24/23 1159       Living Environment    People in Home other (see comments)  Resident of [facility]    Current Living Arrangements extended care facility    Potentially Unsafe Housing Conditions none    In the past 12 months has the electric, gas, oil, or water company threatened to shut off services in your home? No    Primary Care Provided by other (see comments)  Resident of [facility]    Provides Primary Care For no one, unable/limited ability to care for self    Family Caregiver if Needed child(margarette), adult    Family Caregiver Names Karen MUHAMMAD    Quality of Family Relationships helpful;involved    Able to Return to Prior Arrangements yes       Resource/Environmental Concerns    Resource/Environmental Concerns none    Transportation Concerns none       Food Insecurity    Within the past 12 months, you worried that your food would run out before you got the money to buy more. Never true    Within the past 12 months, the food you bought just didn't last and you didn't have money to get more. Never true       Transition Planning    Patient/Family Anticipates Transition to long-term care facility    Patient/Family Anticipated Services at Transition skilled nursing    Transportation Anticipated health plan transportation       Discharge Needs Assessment    Current Outpatient/Agency/Support Group skilled nursing facility    Anticipated Changes Related to Illness none    Outpatient/Agency/Support Group Needs skilled nursing facility                   Discharge Plan       Row Name 11/24/23 1201       Plan    Plan Return to [facility], will need ambulance/stretcher transportation    Patient/Family in Agreement with Plan yes    Plan  Comments Met with pt at bedside, pt unable to answer questions at time. CCP contacted Karen MUHAMMAD, via telephone, introduced self and explained role. POA stated that upon discharge, she would like for pt to return to [facility] rehab. Pt will require ambulance/stretcher transport. Referral placed in [facility]. Informed family that CCP  would follow to assess for discharge needs.                  Continued Care and Services - Admitted Since 11/23/2023       Destination       Service Provider Request Status Selected Services Address Phone Fax Patient Preferred    [facility] Accepted N/A [address] [address] [phone] --                  Selected Continued Care - Episodes Includes continued care and service providers with selected services from the active episodes listed below      Oncology- External Fill Episode start date: 10/10/2023   There are no active outsourced providers for this episode.                 Selected Continued Care - Prior Encounters Includes continued care and service providers with selected services from prior encounters from 8/25/2023 to 11/24/2023      Discharged on 11/22/2023 Admission date: 11/2/2023 - Discharge disposition: Home or Self Care      Destination       Service Provider Selected Services Address Phone Fax Patient Preferred    [facility] [address] [address] [phone] --              Home Medical Care       Service Provider Selected Services Address Phone Fax Patient Preferred    [facility] [address], [address], [place] [facility] [place] [facility] [address] [phone] --                      Discharged on 10/2/2023 Admission date: 9/27/2023 - Discharge disposition: Home-Health Care [facility]      Home Medical Care       Service Provider Selected Services Address Phone Fax Patient Preferred     [facility] [address], [place] [id number] [phone] --                          Expected Discharge Date and Time       Expected Discharge Date Expected Discharge Time    Nov 27, 2023            Demographic Summary    No documentation.                  Functional Status    No documentation.                  Psychosocial    No documentation.                  Abuse/Neglect    No documentation.                  Legal    No documentation.                  Substance Abuse    No documentation.                  Patient Forms    No documentation.                     Cherry Watts RN

## 2023-11-24 NOTE — ED PROVIDER NOTES
EMERGENCY DEPARTMENT ENCOUNTER    Room Number:  16/16  PCP: Brett Jackson MD  History obtained from: Patient      HPI:  Chief Complaint: Nausea and vomiting  A complete HPI/ROS/PMH/PSH/SH/FH are unobtainable due to: Not applicable  Context: Catherine Boyer is a 83 y.o. female who presents to the ED c/o nausea and vomiting.  Started today.  No other recent illness, fever, chills.  Just discharged from the hospital yesterday.  No abdominal pain.  No chest pain.            PAST MEDICAL HISTORY  Active Ambulatory Problems     Diagnosis Date Noted   • Acute on chronic diastolic heart failure 07/14/2022   • CAD (coronary artery disease) 07/14/2022   • Nonbacterial thrombotic endocarditis 07/14/2022   • Paroxysmal atrial fibrillation 07/14/2022   • Myeloproliferative disorder 07/14/2022   • Microcytic anemia 07/14/2022   • Type 2 diabetes mellitus with circulatory disorder, without long-term current use of insulin 07/14/2022   • GERD (gastroesophageal reflux disease)    • Hypertension    • Personal history of transient ischemic attack (TIA), 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 deficits 09/04/2022   • Porcelain gallbladder    • Breast cancer    • Atherosclerosis of abdominal aorta 02/05/2023   • APC (atrial premature contractions) 02/24/2023   • Moderate malnutrition 05/05/2023   • 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 05/05/2023   • Elevated troponin 09/27/2023   • TIA (transient ischemic attack) 09/27/2023   • PUD (peptic ulcer disease) 09/27/2023   • Anemia 09/28/2023   • Tachycardia 10/18/2023   • Myelofibrosis 10/18/2023   • Normocytic anemia 10/18/2023   • Acute kidney injury 11/02/2023   • Pulmonary infiltrate 11/02/2023   • Iatrogenic pneumothorax 11/09/2023     Resolved Ambulatory Problems     Diagnosis Date Noted   • Chest pain with high risk for cardiac etiology 08/31/2022   • COVID 10/29/2022   • Chronic diastolic heart failure    • Acute UTI (urinary tract infection) 11/21/2022   • Cholecystitis 11/22/2022   •  Urinary tract infection without hematuria, site unspecified 01/23/2023   • Epigastric abdominal pain 01/24/2023   • Gastritis 01/26/2023   • ABLA (acute blood loss anemia) 02/04/2023   • Hypokalemia 02/07/2023   • Rotavirus enteritis 05/04/2023   • KARLI (acute kidney injury) 05/04/2023   • Acute renal failure, unspecified acute renal failure type 05/04/2023   • KARLI (acute kidney injury) 08/13/2023   • Intractable nausea and vomiting 08/13/2023   • Dyspnea 09/27/2023   • Acute on chronic heart failure with preserved ejection fraction (HFpEF) 10/02/2023     Past Medical History:   Diagnosis Date   • Atrial fibrillation    • Carotid atherosclerosis    • Chronic diastolic (congestive) heart failure    • Glaucoma    • History of cataract    • Type 2 diabetes mellitus    • Upper GI bleed          PAST SURGICAL HISTORY  Past Surgical History:   Procedure Laterality Date   • BREAST LUMPECTOMY  1999   • BRONCHOSCOPY Bilateral 11/8/2023    Procedure: BRONCHOSCOPY UNDER FLUORO WITH BAL,  BIOPSIES; ACETYLCYSTEIN 4ML GIVEN;  Surgeon: Raoul Alford MD;  Location: [facility] ENDOSCOPY;  Service: Pulmonary;  Laterality: Bilateral;  PRE- PULMONARY INFILTRATES  POST- SAME   • CARDIAC CATHETERIZATION N/A 09/02/2022    Procedure: Coronary angiography;  Surgeon: Guero Verde MD;  Location: [facility] CATH INVASIVE LOCATION;  Service: Cardiovascular;  Laterality: N/A;   • CARDIAC CATHETERIZATION N/A 09/02/2022    Procedure: Stent LEFTY coronary;  Surgeon: Guero Verde MD;  Location: [facility] CATH INVASIVE LOCATION;  Service: Cardiovascular;  Laterality: N/A;   • CATARACT EXTRACTION  2011   • CHOLECYSTECTOMY     • CHOLECYSTECTOMY WITH INTRAOPERATIVE CHOLANGIOGRAM N/A 11/28/2022    Procedure: CHOLECYSTECTOMY LAPAROSCOPIC INTRAOPERATIVE CHOLANGIOGRAM;  Surgeon: Dani Grover Jr., MD;  Location: [facility] MAIN OR;  Service: General;  Laterality: N/A;   • COLONOSCOPY N/A 02/09/2023    Procedure: COLONOSCOPY TO CECUM & T.I.;  Surgeon: Josy  "Guero TREJO MD;  Location: [facility] ENDOSCOPY;  Service: Gastroenterology;  Laterality: N/A;  PRE- MELENA, GI BLEED  POST- DIVERTICULOSIS, INT HEMORRHOIDS   • ENDOSCOPY N/A 01/25/2023    Procedure: ESOPHAGOGASTRODUODENOSCOPY WITH BIOPSIES;  Surgeon: Charles Diaz MD;  Location: [facility] ENDOSCOPY;  Service: Gastroenterology;  Laterality: N/A;  pre: abd pain, nausea  post: hiatal hernia, mild gastritis, sloughing of the esophagus   • ENTEROSCOPY SMALL BOWEL N/A 02/09/2023    Procedure: ENTEROSCOPY SMALL BOWEL;  Surgeon: Guero Ferris MD;  Location: [facility] ENDOSCOPY;  Service: Gastroenterology;  Laterality: N/A;  PRE- MELENA, GI BLEED  POST- HIATAL HERNIA   • JOINT REPLACEMENT     • UPPER GASTROINTESTINAL ENDOSCOPY           FAMILY HISTORY  Family History   Problem Relation Age of Onset   • Ovarian cancer Mother    • Lung cancer Father    • Lung cancer Sister    • Malig Hyperthermia Neg Hx          SOCIAL HISTORY  Social History     Socioeconomic History   • Marital status:    Tobacco Use   • Smoking status: Former     Packs/day: 1.00     Years: 20.00     Additional pack years: 0.00     Total pack years: 20.00     Types: Cigarettes     Passive exposure: Past   • Smokeless tobacco: Never   • Tobacco comments:     30  years ago   Vaping Use   • Vaping Use: Never used   Substance and Sexual Activity   • Alcohol use: Yes     Comment: \"rare\"   • Drug use: Never   • Sexual activity: Defer         ALLERGIES  Aspirin, Diphenhydramine hcl, Hydroxyurea, and Oxycodone        REVIEW OF SYSTEMS    As per HPI      PHYSICAL EXAM  ED Triage Vitals [11/23/23 2312]   Temp Heart Rate Resp BP SpO2   96.9 °F (36.1 °C) 56 18 127/64 94 %      Temp src Heart Rate Source Patient Position BP Location FiO2 (%)   -- -- -- -- --       Physical Exam  Constitutional:       General: She is not in acute distress.     Appearance: She is ill-appearing.   HENT:      Head: Normocephalic and atraumatic.   Cardiovascular:      Rate and Rhythm: Regular " rhythm. Tachycardia present.   Pulmonary:      Effort: Pulmonary effort is normal. No respiratory distress.   Abdominal:      General: There is no distension.      Palpations: Abdomen is soft.      Tenderness: There is no abdominal tenderness.   Musculoskeletal:         General: No swelling or deformity.   Skin:     General: Skin is warm and dry.   Neurological:      Mental Status: She is alert. Mental status is at baseline.           Vital signs and nursing notes reviewed.          LAB RESULTS  Recent Results (from the past 24 hour(s))   ECG 12 Lead Electrolyte Imbalance    Collection Time: 11/23/23 11:47 PM   Result Value Ref Range    QT Interval 393 ms    QTC Interval 530 ms   Comprehensive Metabolic Panel    Collection Time: 11/24/23 12:02 AM    Specimen: Blood   Result Value Ref Range    Glucose 210 (H) 65 - 99 mg/dL    BUN 28 (H) 8 - 23 mg/dL    Creatinine 0.87 0.57 - 1.00 mg/dL    Sodium 135 (L) 136 - 145 mmol/L    Potassium 4.4 3.5 - 5.2 mmol/L    Chloride 93 (L) 98 - 107 mmol/L    CO2 26.2 22.0 - 29.0 mmol/L    Calcium 8.7 8.6 - 10.5 mg/dL    Total Protein 6.2 6.0 - 8.5 g/dL    Albumin 3.7 3.5 - 5.2 g/dL    ALT (SGPT) 24 1 - 33 U/L    AST (SGOT) 28 1 - 32 U/L    Alkaline Phosphatase 209 (H) 39 - 117 U/L    Total Bilirubin 1.0 0.0 - 1.2 mg/dL    Globulin 2.5 gm/dL    A/G Ratio 1.5 g/dL    BUN/Creatinine Ratio 32.2 (H) 7.0 - 25.0    Anion Gap 15.8 (H) 5.0 - 15.0 mmol/L    eGFR 65.4 >60.0 mL/min/1.73   Lipase    Collection Time: 11/24/23 12:02 AM    Specimen: Blood   Result Value Ref Range    Lipase 7 (L) 13 - 60 U/L   Single High Sensitivity Troponin T    Collection Time: 11/24/23 12:02 AM    Specimen: Blood   Result Value Ref Range    HS Troponin T 33 (H) <14 ng/L   Magnesium    Collection Time: 11/24/23 12:02 AM    Specimen: Blood   Result Value Ref Range    Magnesium 1.7 1.6 - 2.4 mg/dL   CBC Auto Differential    Collection Time: 11/24/23 12:02 AM    Specimen: Blood   Result Value Ref Range    WBC 63.14  (C) 3.40 - 10.80 10*3/mm3    RBC 3.13 (L) 3.77 - 5.28 10*6/mm3    Hemoglobin 9.4 (L) 12.0 - 15.9 g/dL    Hematocrit 29.3 (L) 34.0 - 46.6 %    MCV 93.6 79.0 - 97.0 fL    MCH 30.0 26.6 - 33.0 pg    MCHC 32.1 31.5 - 35.7 g/dL    RDW 19.4 (H) 12.3 - 15.4 %    RDW-SD 64.6 (H) 37.0 - 54.0 fl    MPV 12.7 (H) 6.0 - 12.0 fL    Platelets 232 140 - 450 10*3/mm3   Manual Differential    Collection Time: 11/24/23 12:02 AM    Specimen: Blood   Result Value Ref Range    Neutrophil % 85.0 (H) 42.7 - 76.0 %    Lymphocyte % 1.0 (L) 19.6 - 45.3 %    Monocyte % 8.0 5.0 - 12.0 %    Eosinophil % 1.0 0.3 - 6.2 %    Bands %       Metamyelocyte % 1.0 (H) 0.0 - 0.0 %    Myelocyte % 4.0 (H) 0.0 - 0.0 %    Neutrophils Absolute 53.67 (H) 1.70 - 7.00 10*3/mm3    Lymphocytes Absolute 0.63 (L) 0.70 - 3.10 10*3/mm3    Monocytes Absolute 5.05 (H) 0.10 - 0.90 10*3/mm3    Eosinophils Absolute 0.63 (H) 0.00 - 0.40 10*3/mm3    Anisocytosis Slight/1+ None Seen    Poikilocytes Slight/1+ None Seen    WBC Morphology Normal Normal    Platelet Morphology Normal Normal   Procalcitonin    Collection Time: 11/24/23 12:02 AM    Specimen: Blood   Result Value Ref Range    Procalcitonin 0.25 0.00 - 0.25 ng/mL   BNP    Collection Time: 11/24/23 12:02 AM    Specimen: Blood   Result Value Ref Range    proBNP 3,717.0 (H) 0.0 - 1,800.0 pg/mL   Urinalysis With Microscopic If Indicated (No Culture) - Urine, Clean Catch    Collection Time: 11/24/23  2:01 AM    Specimen: Urine, Clean Catch   Result Value Ref Range    Color, UA Yellow Yellow, Straw    Appearance, UA Clear Clear    pH, UA 7.0 5.0 - 8.0    Specific Gravity, UA 1.015 1.005 - 1.030    Glucose, UA Negative Negative    Ketones, UA Negative Negative    Bilirubin, UA Negative Negative    Blood, UA Negative Negative    Protein, UA Negative Negative    Leuk Esterase, UA Negative Negative    Nitrite, UA Negative Negative    Urobilinogen, UA 0.2 E.U./dL 0.2 - 1.0 E.U./dL       Ordered the above labs and reviewed the  results.        RADIOLOGY  CT Angiogram Abdomen Pelvis    Result Date: 11/24/2023  Patient: MARIBELL RUGGIERO  Time Out: 01:24 Exam(s): CTA ABDOMEN + PELVIS EXAM:   CT Angiography Abdomen and Pelvis With Intravenous Contrast CLINICAL HISTORY:      Concern for acute aortic syndrome. TECHNIQUE:   Axial computed tomographic angiography images of the abdomen and pelvis with intravenous contrast.  CTDI is 37 mGy and DLP is 934 mGy-cm.  This CT exam was performed according to the principle of ALARA (As Low As Reasonably Achievable) by using one or more of the following dose reduction techniques: automated exposure control, adjustment of the mA and or kV according to patient size, and or use of iterative reconstruction technique.   MIP reconstructed images were created and reviewed. COMPARISON:   No relevant prior studies available. FINDINGS:  VASCULATURE:   Aorta:  No acute findings.  No aortic aneurysm or dissection.   Celiac trunk and mesenteric arteries:  Mild atherosclerosis of the origin of the celiac trunk, SMA and BETZY without significant stenosis.   Renal arteries:  No acute findings.  No occlusion or significant stenosis.   Iliac arteries:  Moderate atherosclerosis of the bilateral common iliac arteries, external iliac arteries and internal iliac arteries.  No occlusion or significant stenosis.   Lung bases:  Unremarkable.  No mass.  No consolidation.  ABDOMEN:   Liver:  Nodular contour of the liver is concerning for cirrhosis.   Gallbladder and bile ducts:  Cholecystectomy.  No ductal dilation.   Pancreas:  Unremarkable.  No ductal dilation.  No mass.   Spleen:  Splenomegaly.  The spleen is heterogeneous which could represent a perfusion anomaly.   Adrenals:  Unremarkable.  No mass.   Kidneys and ureters:  Unremarkable.  No hydronephrosis.  No solid mass.   Stomach and bowel:  Diverticulosis.  No obstruction.  No mucosal thickening.  PELVIS:   Appendix:  No findings to suggest acute appendicitis.   Bladder:   Unremarkable.  No mass.   Reproductive:  Unremarkable as visualized.  ABDOMEN and PELVIS:   Intraperitoneal space:  Unremarkable.  No significant fluid collection.  No free air.   Bones joints:  There are degenerative changes of the spine.  No acute fracture.  No dislocation.   Soft tissues:  Unremarkable.   Lymph nodes:  Unremarkable.  No enlarged lymph nodes. IMPRESSION:     1.  No aortic aneurysm or dissection. 2.  Nodular contour of the liver is concerning for cirrhosis. 3.  The spleen is heterogeneous which could represent a perfusion anomaly. 4.  Splenomegaly is nonspecific but concerning for portal hypertension. 5.  Diverticulosis.     Electronically signed by Yuki Starr MD on 11-24-23 at 0124    CT Angiogram Chest    Result Date: 11/24/2023  Patient: MARIBELL RUGGIERO  Time Out: 01:22 Exam(s): CTA CHEST EXAM:   CT Angiography Chest With Intravenous Contrast CLINICAL HISTORY:      Concern for acute aortic syndrome. TECHNIQUE:   Axial computed tomographic angiography images of the chest with intravenous contrast.  CTDI is 37 mGy and DLP is 934 mGy-cm.  This CT exam was performed according to the principle of ALARA (As Low As Reasonably Achievable) by using one or more of the following dose reduction techniques: automated exposure control, adjustment of the mA and or kV according to patient size, and or use of iterative reconstruction technique.   MIP reconstructed images were created and reviewed. COMPARISON:   No relevant prior studies available. FINDINGS:   Pulmonary arteries:  Unremarkable.  No pulmonary embolus.   Aorta:  Mild atherosclerosis.  No aortic aneurysm or dissection.   Lungs:  Diffuse bilateral airspace opacities are concerning for multifocal pneumonia and or aspiration.  Cannot exclude superimposed pulmonary edema.   Pleural space:  Unremarkable.  No significant effusion.  No pneumothorax.   Heart:  Unremarkable.  No cardiomegaly.  No significant pericardial effusion.  No evidence of RV  dysfunction.   Bones joints:  There are degenerative changes of the spine.  No acute fracture.   Soft tissues:  Unremarkable.   Lymph nodes:  Unremarkable.  No enlarged lymph nodes. IMPRESSION:     1.  No aortic aneurysm or dissection. 2.  No pulmonary embolus. 3.  Diffuse bilateral airspace opacities are concerning for multifocal pneumonia and or aspiration.  Cannot exclude superimposed pulmonary edema.     Electronically signed by Yuki Starr MD on 11-24-23 at 0122    XR Chest 1 View    Result Date: 11/24/2023  Patient: MARIBELL RUGGIERO  Time Out: 01:00 Exam(s): XR CXR 1 VIEW EXAM:   XR Chest, 1 View CLINICAL HISTORY:      Nausea and upper abd pain. TECHNIQUE:   Frontal view of the chest. COMPARISON:   Chest radiograph 11 20 2023 FINDINGS:   Lungs:  Significantly worsened bilateral airspace opacities.   Pleural space:  New small left pleural effusion.  No pneumothorax.   Heart:  Unremarkable.  No cardiomegaly.   Mediastinum:  Unremarkable.  Normal mediastinal contour.   Bones joints:  There are degenerative changes of the spine.  No acute fracture. IMPRESSION:     1.  Significantly worsened bilateral airspace opacities. 2.  New small left pleural effusion.     Electronically signed by Yuki Starr MD on 11-24-23 at 0100     Ordered the above noted radiological studies. Reviewed by me in PACS.                MEDICATIONS GIVEN IN ER  Medications   ampicillin-sulbactam (UNASYN) 3 g in sodium chloride 0.9 % 100 mL IVPB-VTB (3 g Intravenous New Bag 11/24/23 0253)   azithromycin (ZITHROMAX) 500 mg in sodium chloride 0.9 % 250 mL IVPB-VTB (500 mg Intravenous New Bag 11/24/23 0252)   sodium chloride 0.9 % flush 10 mL (has no administration in time range)   sennosides-docusate (PERICOLACE) 8.6-50 MG per tablet 2 tablet (has no administration in time range)     And   polyethylene glycol (MIRALAX) packet 17 g (has no administration in time range)     And   bisacodyl (DULCOLAX) EC tablet 5 mg (has no administration in  time range)     And   bisacodyl (DULCOLAX) suppository 10 mg (has no administration in time range)   nitroglycerin (NITROSTAT) SL tablet 0.4 mg (has no administration in time range)   acetaminophen (TYLENOL) tablet 650 mg (has no administration in time range)   melatonin tablet 3 mg (has no administration in time range)   ondansetron (ZOFRAN) tablet 4 mg (has no administration in time range)     Or   ondansetron (ZOFRAN) injection 4 mg (has no administration in time range)   ondansetron (ZOFRAN) injection 4 mg (4 mg Intravenous Given 11/24/23 0026)   iopamidol (ISOVUE-370) 76 % injection 100 mL (95 mL Intravenous Given 11/24/23 0033)   furosemide (LASIX) injection 40 mg (40 mg Intravenous Given 11/24/23 0114)   prochlorperazine (COMPAZINE) injection 10 mg (10 mg Intravenous Given 11/24/23 0226)               MEDICAL DECISION MAKING, PROGRESS, and CONSULTS    MDM: Patient presented the emergency department with nausea and vomiting, recently admitted for pneumonia and urinary tract infection.  Tachycardic on arrival, ill-appearing.  Labs significant for chronic leukocytosis, elevated BNP.  Otherwise labs reassuring.  Kidney function appears to be at baseline.  Chest imaging consistent with pulmonary edema versus pneumonia.  Treated broad-spectrum antibiotics.  Discussed with inpatient team, given patient's possible new infection with slightly increased oxygen requirement will admit for further management evaluation.    All labs have been independently reviewed by me.  All radiology studies have been reviewed by me and I have also reviewed the radiology report.   EKG's independently viewed and interpreted by me.  Discussion below represents my analysis of pertinent findings related to patient's condition, differential diagnosis, treatment plan and final disposition.      Additional sources:  - Discussed/ obtained information from independent historians:      - External (non-ED) record review: Reviewed medical record,  patient was just discharged, had some issues with A-fib with RVR while in the hospital that was improved at discharge.  Discharged on 3 L nasal cannula.    - Chronic or social conditions impacting care: Myeloproliferative disorder    - Shared decision making: Discussed plan for admission, patient agrees.      Orders placed during this visit:  Orders Placed This Encounter   Procedures   • Blood Culture - Blood,   • Blood Culture - Blood,   • Respiratory Panel PCR w/COVID-19(SARS-CoV-2) DAMIAN/ARIANA/JAGDEEP/PAD/COR/ADRIANA In-House, NP Swab in UTM/VTM, 2 HR TAT - Swab, Nasopharynx   • XR Chest 1 View   • CT Angiogram Chest   • CT Angiogram Abdomen Pelvis   • Comprehensive Metabolic Panel   • Lipase   • Urinalysis With Microscopic If Indicated (No Culture) - Urine, Clean Catch   • Single High Sensitivity Troponin T   • Magnesium   • CBC Auto Differential   • Manual Differential   • Lactic Acid, Plasma   • Procalcitonin   • BNP   • Comprehensive Metabolic Panel   • CBC (No Diff)   • Diet: Cardiac Diets, Diabetic Diets; Healthy Heart (2-3 Na+); Consistent Carbohydrate; Texture: Regular Texture (IDDSI 7); Fluid Consistency: Thin (IDDSI 0)   • Vital Signs   • Oral Care   • Place Sequential Compression Device   • Maintain Sequential Compression Device   • Telemetry - Maintain IV Access   • Telemetry - Place Orders & Notify Provider of Results When Patient Experiences Acute Chest Pain, Dysrhythmia or Respiratory Distress   • May Be Off Telemetry for Tests   • Up With Assistance   • Daily Weights   • Strict Intake & Output   • Code Status and Medical Interventions:   • LHA (on-call MD unless specified) Details   • Incentive Spirometry   • ECG 12 Lead Electrolyte Imbalance   • Inpatient Admission   • CBC & Differential         Additional orders considered but not ordered:          Differential diagnosis includes but is not limited to:    Pneumonia, sepsis, intra-abdominal infection, aortic dissection, pulmonary embolism, urinary tract  infection      Independent interpretation of labs, radiology studies, and discussions with consultants:  ED Course as of 11/24/23 0619   Thu Nov 23, 2023   2341 Chest x-ray interpreted myself:  Bilateral increased interstitial lung markings with hazy borders of the aortic arch, infiltrates are somewhat improved from prior x-ray [FS]   Fri Nov 24, 2023   0002 EKG interpreted myself:  2347, sinus tachycardia with frequent PVCs/PACs, T wave inversion in aVL, no other ST segment changes or T wave inversions. [FS]   0019 WBC(!!): 63.14 [FS]   0035 HS Troponin T(!): 33 [FS]   0035 BUN(!): 28 [FS]   0055 CTA chest interpreted myself:  No acute vascular abnormality, pulmonary edema bilaterally vs. pneumonia [FS]   0138 Heart Rate: 106 [FS]   0329 Lactate(!!): 5.2 [FS]      ED Course User Index  [FS] Alvarez Sanches MD           DIAGNOSIS  Final diagnoses:   Community acquired pneumonia, unspecified laterality   Sepsis without acute organ dysfunction, due to unspecified organism   Hypoxia         DISPOSITION  Admitted to [facility] telemetry        Latest Documented Vital Signs:  As of 03:09 EST  BP- 104/53 HR- 80 Temp- 96.9 °F (36.1 °C) O2 sat- 93%              --    Please note that portions of this were completed with a voice recognition program.       Note Disclaimer: At [facility], we believe that sharing information builds trust and better relationships. You are receiving this note because you are receiving care at [facility] or recently visited. It is possible you will see health information before a provider has talked with you about it. This kind of information can be easy to misunderstand. To help you fully understand what it means for your health, we urge you to discuss this note with your provider.             Alvarez Sanches MD  11/24/23 0312       Alvarez Sanches MD  11/24/23 0619

## 2023-11-24 NOTE — PROGRESS NOTES
Nutrition Services    Patient Name:  Catherine Boyer  YOB: 1940  MRN: [id number]  Admit Date:  11/23/2023    Assessment Date:  11/24/23    Summary: Nutrition assessment triggered by MST score of 3 per nurse admission screen.  Admitted with N/V, abdominal pain.  PNA.  Recent lengthy admission, just d/c'ed 11/22 then re-admitted 11/23.    0-100% at meals per chart PO data.  Patient was last seen by RD 11/21, she was drinking Boost GC daily - will add orders for this again.    RD to continue to follow.    CLINICAL NUTRITION ASSESSMENT      Reason for Assessment MST score 2+     Diagnosis/Problem   PNA   Medical/Surgical History Past Medical History:   Diagnosis Date    Atherosclerosis of abdominal aorta 02/05/2023    Atrial fibrillation     Breast cancer     CAD (coronary artery disease)     NSTEMI 2/2022: 90% ostial LAD, 99% D1, 70% mid-distal LAD (medical therapy). She received two stents (2.5x18 and 2.5x26mm [place] LEFTY) but I don't know which one went to which lesion.    Carotid atherosclerosis     Cholecystitis 11/22/2022    Added automatically from request for surgery [id number]    Chronic diastolic (congestive) heart failure     COVID 10/29/2022    GERD (gastroesophageal reflux disease)     Glaucoma     History of cataract     Hypertension     Microcytic anemia     per Dr. oleg pate office  note 6/30/22-dd    Myeloproliferative disorder     JAK2 positive    Nonbacterial thrombotic endocarditis     6/2021: 4x5mm vegetation on the ventricular surface of the anterior MV, negative blood cultures    Porcelain gallbladder     PUD (peptic ulcer disease)     TIA (transient ischemic attack)     Type 2 diabetes mellitus     Type 2 diabetes mellitus with circulatory disorder, without long-term current use of insulin 07/14/2022    Upper GI bleed        Past Surgical History:   Procedure Laterality Date    BREAST LUMPECTOMY  1999    BRONCHOSCOPY Bilateral 11/8/2023    Procedure: BRONCHOSCOPY UNDER FLUORO WITH BAL,  " BIOPSIES; ACETYLCYSTEIN 4ML GIVEN;  Surgeon: Raoul Alford MD;  Location: [facility] ENDOSCOPY;  Service: Pulmonary;  Laterality: Bilateral;  PRE- PULMONARY INFILTRATES  POST- SAME    CARDIAC CATHETERIZATION N/A 09/02/2022    Procedure: Coronary angiography;  Surgeon: Guero Verde MD;  Location: [facility] CATH INVASIVE LOCATION;  Service: Cardiovascular;  Laterality: N/A;    CARDIAC CATHETERIZATION N/A 09/02/2022    Procedure: Stent LEFTY coronary;  Surgeon: Guero Verde MD;  Location: [facility] CATH INVASIVE LOCATION;  Service: Cardiovascular;  Laterality: N/A;    CATARACT EXTRACTION  2011    CHOLECYSTECTOMY      CHOLECYSTECTOMY WITH INTRAOPERATIVE CHOLANGIOGRAM N/A 11/28/2022    Procedure: CHOLECYSTECTOMY LAPAROSCOPIC INTRAOPERATIVE CHOLANGIOGRAM;  Surgeon: Dani Grover Jr., MD;  Location: [facility] MAIN OR;  Service: General;  Laterality: N/A;    COLONOSCOPY N/A 02/09/2023    Procedure: COLONOSCOPY TO CECUM & T.I.;  Surgeon: Guero Ferris MD;  Location: [facility] ENDOSCOPY;  Service: Gastroenterology;  Laterality: N/A;  PRE- MELENA, GI BLEED  POST- DIVERTICULOSIS, INT HEMORRHOIDS    ENDOSCOPY N/A 01/25/2023    Procedure: ESOPHAGOGASTRODUODENOSCOPY WITH BIOPSIES;  Surgeon: Charles Diaz MD;  Location: [facility] ENDOSCOPY;  Service: Gastroenterology;  Laterality: N/A;  pre: abd pain, nausea  post: hiatal hernia, mild gastritis, sloughing of the esophagus    ENTEROSCOPY SMALL BOWEL N/A 02/09/2023    Procedure: ENTEROSCOPY SMALL BOWEL;  Surgeon: Guero Ferris MD;  Location: [facility] ENDOSCOPY;  Service: Gastroenterology;  Laterality: N/A;  PRE- MELENA, GI BLEED  POST- HIATAL HERNIA    JOINT REPLACEMENT      UPPER GASTROINTESTINAL ENDOSCOPY          Anthropometrics        Current Height  Current Weight  BMI kg/m2 Height: 157.5 cm (62\")  Weight: 61.5 kg (135 lb 9.3 oz) (11/24/23 0445)  Body mass index is 24.8 kg/m².   Adjusted BMI (if applicable)    BMI Category Normal/Healthy (18.4 - 24.9)   Ideal Body Weight (IBW) " 110 lb (50.1 kg)   Usual Body Weight (UBW) Unsure   Weight Trend Other: fluctuations   Weight History Wt Readings from Last 30 Encounters:   11/24/23 0445 61.5 kg (135 lb 9.3 oz)   11/23/23 2312 63.5 kg (140 lb)   11/21/23 0647 62.3 kg (137 lb 5.6 oz)   11/20/23 0549 61.9 kg (136 lb 7.4 oz)   11/19/23 0516 63.5 kg (140 lb)   11/18/23 0528 63.5 kg (140 lb)   11/16/23 0548 63.6 kg (140 lb 3.4 oz)   11/15/23 0600 63.7 kg (140 lb 6.9 oz)   11/14/23 0600 63.2 kg (139 lb 5.3 oz)   11/13/23 0600 66.4 kg (146 lb 6.2 oz)   11/12/23 0509 66.7 kg (147 lb)   11/11/23 0545 67.1 kg (147 lb 14.9 oz)   11/10/23 0600 67.8 kg (149 lb 7.6 oz)   11/09/23 0600 67.7 kg (149 lb 4 oz)   11/08/23 0428 67.7 kg (149 lb 4 oz)   11/07/23 0600 67.6 kg (149 lb 0.5 oz)   11/06/23 0605 67.6 kg (149 lb 0.5 oz)   11/05/23 0507 62.6 kg (138 lb)   11/04/23 0539 62.6 kg (138 lb)   11/03/23 0556 63 kg (138 lb 14.2 oz)   10/18/23 1601 67.7 kg (149 lb 4.8 oz)   10/18/23 1307 66.1 kg (145 lb 12.8 oz)   10/11/23 1225 64.4 kg (142 lb)   10/02/23 0618 64.6 kg (142 lb 6.7 oz)   10/01/23 0525 62.8 kg (138 lb 7.2 oz)   09/30/23 0429 65.3 kg (143 lb 15.4 oz)   09/29/23 0500 66 kg (145 lb 9.6 oz)   09/28/23 0445 66.1 kg (145 lb 11.6 oz)   09/27/23 1219 68.6 kg (151 lb 3.2 oz)   09/19/23 1559 68.7 kg (151 lb 8 oz)   08/28/23 1556 66.7 kg (147 lb)   08/13/23 1508 67.1 kg (147 lb 14.4 oz)   08/13/23 1044 65.8 kg (145 lb)   08/07/23 1456 67.6 kg (149 lb)   08/02/23 0913 67.1 kg (148 lb)   06/26/23 1430 66 kg (145 lb 6.4 oz)   05/16/23 0509 60.8 kg (134 lb 0.6 oz)   05/15/23 0427 60.6 kg (133 lb 9.6 oz)   05/14/23 0537 64.1 kg (141 lb 5 oz)   05/13/23 0532 62.4 kg (137 lb 9.1 oz)   05/11/23 0500 60.6 kg (133 lb 9.6 oz)   05/10/23 0500 62 kg (136 lb 11 oz)   05/09/23 0500 64.6 kg (142 lb 6.7 oz)   05/07/23 0546 63.8 kg (140 lb 10.5 oz)   05/06/23 1050 63 kg (138 lb 14.2 oz)   05/06/23 0438 63 kg (138 lb 14.2 oz)   05/05/23 0715 63.4 kg (139 lb 12.4 oz)   05/04/23 0542  61.2 kg (134 lb 14.7 oz)   05/03/23 1540 53.5 kg (118 lb)   04/10/23 1305 65.8 kg (145 lb 1.6 oz)   03/21/23 1549 64.7 kg (142 lb 9.6 oz)   02/24/23 1309 65.2 kg (143 lb 12.8 oz)   02/15/23 1446 65.3 kg (144 lb)   02/04/23 1422 66.7 kg (147 lb)   01/23/23 0829 69.5 kg (153 lb 3.5 oz)   01/23/23 0151 68 kg (150 lb)   12/20/22 1339 69 kg (152 lb 3.2 oz)   12/19/22 1620 69.5 kg (153 lb 3.2 oz)   12/19/22 1625 69.5 kg (153 lb 3.2 oz)   11/28/22 1126 70 kg (154 lb 5.2 oz)   11/21/22 0918 70 kg (154 lb 5.2 oz)   11/21/22 0259 68 kg (150 lb)   11/21/22 0024 68 kg (150 lb)   11/18/22 0900 68.5 kg (151 lb)   11/08/22 1520 68.9 kg (151 lb 12.8 oz)   10/30/22 0801 68.9 kg (152 lb)   10/26/22 0834 70.8 kg (156 lb 1.6 oz)   10/07/22 0749 71.2 kg (157 lb)   09/27/22 1640 72.7 kg (160 lb 3.2 oz)      --  Labs       Pertinent Labs    Results from last 7 days   Lab Units 11/24/23  0736 11/24/23  0002 11/22/23  0601   SODIUM mmol/L 139 135* 139   POTASSIUM mmol/L 4.1 4.4 3.8   CHLORIDE mmol/L 89* 93* 94*   CO2 mmol/L 27.2 26.2 26.9   BUN mg/dL 23 28* 25*   CREATININE mg/dL 0.91 0.87 0.81   CALCIUM mg/dL 9.2 8.7 9.3   BILIRUBIN mg/dL 1.1 1.0  --    ALK PHOS U/L 265* 209*  --    ALT (SGPT) U/L 24 24  --    AST (SGOT) U/L 20 28  --    GLUCOSE mg/dL 133* 210* 76     Results from last 7 days   Lab Units 11/24/23  0736 11/24/23  0002 11/22/23  0601   MAGNESIUM mg/dL  --  1.7  --    PHOSPHORUS mg/dL  --   --  2.8   HEMOGLOBIN g/dL 10.1* 9.4*  --    HEMATOCRIT % 31.4* 29.3*  --    WBC 10*3/mm3 83.18* 63.14*  --    ALBUMIN g/dL 4.0 3.7 4.1     Results from last 7 days   Lab Units 11/24/23  0736 11/24/23  0002 11/22/23  0456 11/21/23  0603 11/20/23  0603   PLATELETS 10*3/mm3 298 232 226 223 241     COVID19   Date Value Ref Range Status   11/24/2023 Not Detected Not Detected - Ref. Range Final     Lab Results   Component Value Date    HGBA1C 6.60 (H) 09/29/2023          Medications           Scheduled Medications ampicillin-sulbactam, 3 g,  Intravenous, Q6H  apixaban, 5 mg, Oral, BID  aspirin, 81 mg, Oral, Daily  Eucerin original healing lotion, , Topical, Q12H  famotidine, 20 mg, Oral, Daily  lactated ringers, 1,000 mL, Intravenous, Once  Menthol-Zinc Oxide, 1 application , Topical, Q12H  metoprolol succinate XL, 25 mg, Oral, Daily  predniSONE, 20 mg, Oral, Daily With Breakfast   Followed by  [START ON 11/29/2023] predniSONE, 15 mg, Oral, Daily With Breakfast   Followed by  [START ON 12/6/2023] predniSONE, 10 mg, Oral, Daily With Breakfast   Followed by  [START ON 12/13/2023] predniSONE, 5 mg, Oral, Daily With Breakfast  senna-docusate sodium, 2 tablet, Oral, BID  sertraline, 100 mg, Oral, Daily  tiotropium bromide monohydrate, 2 puff, Inhalation, Daily - RT       Infusions     PRN Medications   acetaminophen    senna-docusate sodium **AND** polyethylene glycol **AND** bisacodyl **AND** bisacodyl    melatonin    nitroglycerin    ondansetron **OR** ondansetron    sodium chloride     Physical Findings          General Findings on oxygen therapy   Oral/Mouth Cavity tooth or teeth missing   Edema  no edema   Gastrointestinal non-distended , last bowel movement: 11/24   Skin  bruising, excoriation, MASD, other: abrasion, location of wound: L 4th toe, L plantar, L leg   Tubes/Drains/Lines none   NFPE Not indicated at this time   --  Current Nutrition Orders & Evaluation of Intake       Oral Nutrition     Food Allergies NKFA   Current PO Diet Diet: Cardiac Diets, Diabetic Diets; Healthy Heart (2-3 Na+); Consistent Carbohydrate; Texture: Regular Texture (IDDSI 7); Fluid Consistency: Thin (IDDSI 0)   Supplement n/a   PO Evaluation     % PO Intake 0-100%    Factors Affecting Intake: abdominal pain, nausea, vomiting   --  PES STATEMENT / NUTRITION DIAGNOSIS      Nutrition Dx Problem  Problem: Predicted Suboptimal Intake  Etiology: Medical Diagnosis - PNA    Signs/Symptoms: Report/Observation     NUTRITION INTERVENTION / PLAN OF CARE      Intervention Goal(s)  Maintain nutrition status, Reduce/improve symptoms, Disease management/therapy, Tolerate PO , Increase intake, and Maintain weight         RD Intervention/Action Continue to monitor, Care plan reviewed, and Recommend/order: ONS   --      Prescription/Orders:       PO Diet       Supplements Boost GC daily      Enteral Nutrition       Parenteral Nutrition    New Prescription Ordered? Yes   --      Monitor/Evaluation Per protocol, PO intake, Supplement intake, Pertinent labs, Weight, Skin status, Symptoms   Discharge Plan/Needs Pending clinical course   --    RD to follow per protocol.      Electronically signed by:  Naty Lozada RD  11/24/23 15:08 EST   [Negative] : Endocrine [TextBox_3] : above

## 2023-11-24 NOTE — NURSING NOTE
Wound/Ostomy: We see a patient at the request of the floor nurse regarding skin issue on left lower leg/feet. Upon assessment we could see some erythema with the scabs  on the left side and full-thickness skin and tissue loss on left 4th Toe.   Red discoloration, blanchable area seen on Sacrococcygeal, possibly related with moisture.  Wound care order and nursing intervention have been implemented into Epic.   Please add Accumax pump and re-consult for any additional needs.

## 2023-11-24 NOTE — CONSULTS
Subjective     REASON FOR CONSULTATION:    Evaluation and management for primary myelofibrosis                             REQUESTING PHYSICIAN:  Dr. Franki MD    RECORDS OBTAINED:  Records of the patients history including those obtained from the referring provider were reviewed and summarized in detail.    HISTORY OF PRESENT ILLNESS:  The patient is a 83 y.o. year old female with medical history significant for JAK2 positive myeloproliferative disorder, perhaps primary myelofibrosis, A-fib on chronic Eliquis, CAD with stents, history of endocarditis and TIA had presented to [facility] ER on 11/23/2023 with nausea and vomiting.  Patient was discharged just a day before after prolonged hospitalization for ESBL UTI, acute renal failure and respiratory distress.    Patient states that she initially reached the rehab facility, she started to feel bad.  She developed nausea and vomiting and this was brought back to the hospital.  Work-up in the ER with CT scans did not show any acute abnormalities.  Persistent bilateral pulmonary opacities, concerning for multifocal pneumonia and/or aspiration.    Hematology/oncology has been consulted for further evaluation and management.  Patient is well-known to our service and follows up with Dr. Reinoso for primary myelofibrosis management.  She was recently started on Ojjara for her disease.    Past Medical History:   Diagnosis Date    Atherosclerosis of abdominal aorta 02/05/2023    Atrial fibrillation     Breast cancer     CAD (coronary artery disease)     NSTEMI 2/2022: 90% ostial LAD, 99% D1, 70% mid-distal LAD (medical therapy). She received two stents (2.5x18 and 2.5x26mm [place] LEFTY) but I don't know which one went to which lesion.    Carotid atherosclerosis     Cholecystitis 11/22/2022    Added automatically from request for surgery [id number]    Chronic diastolic (congestive) heart failure     COVID 10/29/2022    GERD (gastroesophageal reflux disease)     Glaucoma      History of cataract     Hypertension     Microcytic anemia     per Dr. oleg pate office  note 6/30/22-dd    Myeloproliferative disorder     JAK2 positive    Nonbacterial thrombotic endocarditis     6/2021: 4x5mm vegetation on the ventricular surface of the anterior MV, negative blood cultures    Porcelain gallbladder     PUD (peptic ulcer disease)     TIA (transient ischemic attack)     Type 2 diabetes mellitus     Type 2 diabetes mellitus with circulatory disorder, without long-term current use of insulin 07/14/2022    Upper GI bleed         Past Surgical History:   Procedure Laterality Date    BREAST LUMPECTOMY  1999    BRONCHOSCOPY Bilateral 11/8/2023    Procedure: BRONCHOSCOPY UNDER FLUORO WITH BAL,  BIOPSIES; ACETYLCYSTEIN 4ML GIVEN;  Surgeon: Raoul Alford MD;  Location: [facility] ENDOSCOPY;  Service: Pulmonary;  Laterality: Bilateral;  PRE- PULMONARY INFILTRATES  POST- SAME    CARDIAC CATHETERIZATION N/A 09/02/2022    Procedure: Coronary angiography;  Surgeon: Guero Verde MD;  Location: [facility] CATH INVASIVE LOCATION;  Service: Cardiovascular;  Laterality: N/A;    CARDIAC CATHETERIZATION N/A 09/02/2022    Procedure: Stent LEFTY coronary;  Surgeon: Guero Verde MD;  Location: [facility] CATH INVASIVE LOCATION;  Service: Cardiovascular;  Laterality: N/A;    CATARACT EXTRACTION  2011    CHOLECYSTECTOMY      CHOLECYSTECTOMY WITH INTRAOPERATIVE CHOLANGIOGRAM N/A 11/28/2022    Procedure: CHOLECYSTECTOMY LAPAROSCOPIC INTRAOPERATIVE CHOLANGIOGRAM;  Surgeon: Dani Grover Jr., MD;  Location: [facility] MAIN OR;  Service: General;  Laterality: N/A;    COLONOSCOPY N/A 02/09/2023    Procedure: COLONOSCOPY TO CECUM & T.I.;  Surgeon: Guero Ferris MD;  Location: [facility] ENDOSCOPY;  Service: Gastroenterology;  Laterality: N/A;  PRE- MELENA, GI BLEED  POST- DIVERTICULOSIS, INT HEMORRHOIDS    ENDOSCOPY N/A 01/25/2023    Procedure: ESOPHAGOGASTRODUODENOSCOPY WITH BIOPSIES;  Surgeon: Charles Diaz MD;  Location:   [facility] ENDOSCOPY;  Service: Gastroenterology;  Laterality: N/A;  pre: abd pain, nausea  post: hiatal hernia, mild gastritis, sloughing of the esophagus    ENTEROSCOPY SMALL BOWEL N/A 02/09/2023    Procedure: ENTEROSCOPY SMALL BOWEL;  Surgeon: Guero Ferris MD;  Location:  [facility] ENDOSCOPY;  Service: Gastroenterology;  Laterality: N/A;  PRE- MELENA, GI BLEED  POST- HIATAL HERNIA    JOINT REPLACEMENT      UPPER GASTROINTESTINAL ENDOSCOPY          No current facility-administered medications on file prior to encounter.     Current Outpatient Medications on File Prior to Encounter   Medication Sig Dispense Refill    acetaminophen (TYLENOL) 500 MG tablet Take 1 tablet by mouth As Needed.      apixaban (ELIQUIS) 5 MG tablet tablet Take 1 tablet by mouth 2 (Two) Times a Day. 180 tablet 3    aspirin 81 MG EC tablet Take 1 tablet by mouth Daily.      bisacodyl (DULCOLAX) 10 MG suppository Insert 1 suppository into the rectum Daily As Needed for Constipation (Use if bisacodyl oral is ineffective).      budesonide-formoterol (SYMBICORT) 160-4.5 MCG/ACT inhaler Inhale 2 puffs 2 (Two) Times a Day.      cadexomer iodine (IODOSORB) 0.9 % gel Apply 1 application  topically to the appropriate area as directed Daily.      famotidine (PEPCID) 20 MG tablet Take 1 tablet by mouth Daily.      furosemide (LASIX) 20 MG tablet Take 0.5 tablets by mouth Daily.      latanoprost (XALATAN) 0.005 % ophthalmic solution Administer 1 drop to both eyes.      metoprolol succinate XL (TOPROL-XL) 25 MG 24 hr tablet Take 1 tablet by mouth Daily. 90 tablet 2    Momelotinib Dihydrochloride (OJJAARA) 200 MG tablet Take 1 tablet by mouth Daily. 28 tablet 3    polyethylene glycol (MIRALAX) 17 g packet Take 17 g by mouth Daily As Needed (Use if senna-docusate is ineffective).      predniSONE (DELTASONE) 5 MG tablet Take 4 tablets by mouth Daily With Breakfast for 6 days, THEN 3 tablets Daily With Breakfast for 7 days, THEN 2 tablets Daily With Breakfast for 7  "days, THEN 1 tablet Daily With Breakfast for 7 days. 66 tablet 0    saccharomyces boulardii (FLORASTOR) 250 MG capsule Take 1 capsule by mouth 2 (Two) Times a Day. 60 capsule 0    sertraline (ZOLOFT) 100 MG tablet Take 1 tablet by mouth Daily.      simethicone (MYLICON) 80 MG chewable tablet Chew 1 tablet by mouth 4 (Four) Times a Day As Needed for Flatulence. 100 tablet 0    tiotropium bromide monohydrate (SPIRIVA RESPIMAT) 2.5 MCG/ACT aerosol solution inhaler Inhale 2 puffs Daily.          ALLERGIES:    Allergies   Allergen Reactions    Aspirin Unknown - Low Severity    Diphenhydramine Hcl Anxiety     Benadryl IVP    Hydroxyurea Other (See Comments)     Her hemoglobin drop and was stop in 2022 and start taking Jakafi    Oxycodone Unknown - Low Severity        Social History     Socioeconomic History    Marital status:    Tobacco Use    Smoking status: Former     Packs/day: 1.00     Years: 20.00     Additional pack years: 0.00     Total pack years: 20.00     Types: Cigarettes     Quit date: 1950     Years since quittin.0     Passive exposure: Past    Smokeless tobacco: Never    Tobacco comments:     30  years ago   Vaping Use    Vaping Use: Never used   Substance and Sexual Activity    Alcohol use: Not Currently     Comment: \"rare\"    Drug use: Never    Sexual activity: Defer        Family History   Problem Relation Age of Onset    Ovarian cancer Mother     Lung cancer Father     Lung cancer Sister     Malig Hyperthermia Neg Hx         Review of Systems   As per HPI    Objective     Vitals:    23 0335 23 0445 23 0655 23 1131   BP:  124/72 115/67 118/62   BP Location:  Right arm Right arm Right arm   Patient Position:  Lying Lying Lying   Pulse: 113 105 59 106   Resp:     Temp:  97.7 °F (36.5 °C) 97.9 °F (36.6 °C) 97.5 °F (36.4 °C)   TempSrc:  Oral Oral    SpO2: 98% 92% 92% 94%   Weight:  61.5 kg (135 lb 9.3 oz)     Height:             10/18/2023     4:02 PM "   Current Status   ECOG score 2       Physical Exam    CONSTITUTIONAL:  Vital signs reviewed.  No distress, looks comfortable.  EYES:  Conjunctiva and lids unremarkable.    EARS,NOSE,MOUTH,THROAT:  Ears and nose appear unremarkable.    RESPIRATORY:  Normal respiratory effort.  On 2-3 L nasal cannula oxygen  CARDIOVASCULAR:  Normal heart rate.  GASTROINTESTINAL: Abdomen appears unremarkable.  Nondistended   LYMPHATIC:  No cervical, supraclavicular lymphadenopathy.  SKIN:  Warm.  No rashes.  PSYCHIATRIC:  Normal judgment and insight.  Normal mood and affect.  NEURO: AAOx3, no obvious focal deficits.      RECENT LABS:  Hematology WBC   Date Value Ref Range Status   11/24/2023 83.18 (C) 3.40 - 10.80 10*3/mm3 Final     RBC   Date Value Ref Range Status   11/24/2023 3.40 (L) 3.77 - 5.28 10*6/mm3 Final     Hemoglobin   Date Value Ref Range Status   11/24/2023 10.1 (L) 12.0 - 15.9 g/dL Final     Hematocrit   Date Value Ref Range Status   11/24/2023 31.4 (L) 34.0 - 46.6 % Final     Platelets   Date Value Ref Range Status   11/24/2023 298 140 - 450 10*3/mm3 Final          Assessment & Plan   Patient is a pleasant 83-year-old female with medical history significant for JAK2 positive myeloproliferative disorder, perhaps primary myelofibrosis, A-fib on chronic Eliquis, CAD with stents, history of endocarditis and TIA admitted with nausea and vomiting.      #Nausea and vomiting due to suspected sepsis and pneumonia related:  Patient had presented to [facility] ER on 11/23/2023 with nausea and vomiting.  Patient was discharged just a day before after prolonged hospitalization for ESBL UTI, acute renal failure and respiratory distress.  Patient states that she initially reached the rehab facility, she started to feel bad.  She developed nausea and vomiting and this was brought back to the hospital.  Work-up in the ER with CT scans did not show any acute abnormalities.  Persistent bilateral pulmonary opacities,  concerning for multifocal pneumonia and/or aspiration.  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  This is likely primary myelofibrosis +/-steroids and infection related  Will hold Ojjara while inpatient as it can be associated with nausea and vomiting    #Leukocytosis: As above    #Liver cirrhosis and splenomegaly    #Chronic hypoxic respiratory failure:  She was evaluated with bronchoscopy with biopsies on 11/8/2023.  No evidence of malignancy.  Continue with gradual steroids taper  Antibiotics as above    #History of A-fib on Eliquis    Recommendations:  -Hold Ojjara while inpatient.  -Continue antibiotics and IV fluids as per primary  -Continue gradual taper of steroids  -Will follow in periphery.    She has an appointment scheduled with our office on 12/20/2023.  Please call us with any questions

## 2023-11-24 NOTE — ED NOTES
abd pain and N/V since 1200 today. Pt discharged this morning from [facility] with diagnosis of diverticulitis. Pt new resident at her nursing facility arriving this morning.   Pt from [facility].

## 2023-11-24 NOTE — H&P
Patient Name:  Catherine Boyer  YOB: 1940  MRN:  [id number]  Admit Date:  11/23/2023  Patient Care Team:  Brett Jackson MD as PCP - General (Internal Medicine)  Gerard Foreman MD as Referring Physician (Medical Oncology)  Darrell Reinoso MD as Consulting Physician (Hematology and Oncology)  Albin Barriga MD as Cardiologist (Cardiology)  Richard Aldana, RN as       Subjective   History Present Illness     Chief Complaint   Patient presents with    Vomiting    Nausea    Abdominal Pain     The patient is a 83 y.o. female with a history of HTN, HLD, CAD, Myeloproliferative disorder, PAF, Type 2 DM, chronic diastolic CHF, chronic hypoxic respiratory failure on 3L NC at home with activity and at night who presented to hospital with nausea, vomiting starting the day of arrival.  She was just recently discharged on the day prior to presentation after a prolonged hospital course of 20 days with complicated course including ESBL UTI, acute renal failure, lung mass which required a bronchoscopy which was unfortunately complicated by an iatrogenic pneumothorax.  The bronchoscopy did not show any evidence of malignancy, nor was there any evidence of interstitial lung disease.  In any case, the day after discharge, in the morning, she started experiencing nausea and vomiting.  Started feeling poorly and was subsequently brought back to the emergency department on the evening of the day after discharge.  Upon presentation to the emergency department she underwent a CT angio of the chest abdomen pelvis that did not reveal any aneurysm or dissection.  No pulmonary embolism was identified.  Diffuse bilateral airspace opacities concerning for multifocal pneumonia or aspiration.  Some findings consistent with cirrhosis were noted along with some splenomegaly.  Laboratory work-up significant for white blood cell count elevation to 63.14, lactic acid elevation of 5.2, normal renal function, proBNP of  3700, troponin level of 33.           Personal History     Past Medical History:   Diagnosis Date    Atherosclerosis of abdominal aorta 02/05/2023    Atrial fibrillation     Breast cancer     CAD (coronary artery disease)     NSTEMI 2/2022: 90% ostial LAD, 99% D1, 70% mid-distal LAD (medical therapy). She received two stents (2.5x18 and 2.5x26mm Finesse LEFTY) but I don't know which one went to which lesion.    Carotid atherosclerosis     Cholecystitis 11/22/2022    Added automatically from request for surgery [id number]    Chronic diastolic (congestive) heart failure     COVID 10/29/2022    GERD (gastroesophageal reflux disease)     Glaucoma     History of cataract     Hypertension     Microcytic anemia     per Dr. oleg pate office  note 6/30/22-dd    Myeloproliferative disorder     JAK2 positive    Nonbacterial thrombotic endocarditis     6/2021: 4x5mm vegetation on the ventricular surface of the anterior MV, negative blood cultures    Porcelain gallbladder     PUD (peptic ulcer disease)     TIA (transient ischemic attack)     Type 2 diabetes mellitus     Type 2 diabetes mellitus with circulatory disorder, without long-term current use of insulin 07/14/2022    Upper GI bleed      Past Surgical History:   Procedure Laterality Date    BREAST LUMPECTOMY  1999    BRONCHOSCOPY Bilateral 11/8/2023    Procedure: BRONCHOSCOPY UNDER FLUORO WITH BAL,  BIOPSIES; ACETYLCYSTEIN 4ML GIVEN;  Surgeon: Raoul Alford MD;  Location: [facility] ENDOSCOPY;  Service: Pulmonary;  Laterality: Bilateral;  PRE- PULMONARY INFILTRATES  POST- SAME    CARDIAC CATHETERIZATION N/A 09/02/2022    Procedure: Coronary angiography;  Surgeon: Guero Verde MD;  Location: [facility] CATH INVASIVE LOCATION;  Service: Cardiovascular;  Laterality: N/A;    CARDIAC CATHETERIZATION N/A 09/02/2022    Procedure: Stent LEFTY coronary;  Surgeon: Guero Verde MD;  Location: [facility] CATH INVASIVE LOCATION;  Service: Cardiovascular;  Laterality: N/A;    CATARACT  "EXTRACTION      CHOLECYSTECTOMY      CHOLECYSTECTOMY WITH INTRAOPERATIVE CHOLANGIOGRAM N/A 2022    Procedure: CHOLECYSTECTOMY LAPAROSCOPIC INTRAOPERATIVE CHOLANGIOGRAM;  Surgeon: Dani Grover Jr., MD;  Location: [facility] MAIN OR;  Service: General;  Laterality: N/A;    COLONOSCOPY N/A 2023    Procedure: COLONOSCOPY TO CECUM & T.I.;  Surgeon: Guero Ferris MD;  Location: [facility] ENDOSCOPY;  Service: Gastroenterology;  Laterality: N/A;  PRE- MELENA, GI BLEED  POST- DIVERTICULOSIS, INT HEMORRHOIDS    ENDOSCOPY N/A 2023    Procedure: ESOPHAGOGASTRODUODENOSCOPY WITH BIOPSIES;  Surgeon: Charles Diaz MD;  Location: [facility] ENDOSCOPY;  Service: Gastroenterology;  Laterality: N/A;  pre: abd pain, nausea  post: hiatal hernia, mild gastritis, sloughing of the esophagus    ENTEROSCOPY SMALL BOWEL N/A 2023    Procedure: ENTEROSCOPY SMALL BOWEL;  Surgeon: Guero Ferris MD;  Location: [facility] ENDOSCOPY;  Service: Gastroenterology;  Laterality: N/A;  PRE- MELENA, GI BLEED  POST- HIATAL HERNIA    JOINT REPLACEMENT      UPPER GASTROINTESTINAL ENDOSCOPY       Family History   Problem Relation Age of Onset    Ovarian cancer Mother     Lung cancer Father     Lung cancer Sister     Malig Hyperthermia Neg Hx      Social History     Tobacco Use    Smoking status: Former     Packs/day: 1.00     Years: 20.00     Additional pack years: 0.00     Total pack years: 20.00     Types: Cigarettes     Quit date: 1950     Years since quittin.0     Passive exposure: Past    Smokeless tobacco: Never    Tobacco comments:     30  years ago   Vaping Use    Vaping Use: Never used   Substance Use Topics    Alcohol use: Not Currently     Comment: \"rare\"    Drug use: Never     No current facility-administered medications on file prior to encounter.     Current Outpatient Medications on File Prior to Encounter   Medication Sig Dispense Refill    acetaminophen (TYLENOL) 500 MG tablet Take 1 tablet by mouth As Needed.  "     apixaban (ELIQUIS) 5 MG tablet tablet Take 1 tablet by mouth 2 (Two) Times a Day. 180 tablet 3    aspirin 81 MG EC tablet Take 1 tablet by mouth Daily.      bisacodyl (DULCOLAX) 10 MG suppository Insert 1 suppository into the rectum Daily As Needed for Constipation (Use if bisacodyl oral is ineffective).      budesonide-formoterol (SYMBICORT) 160-4.5 MCG/ACT inhaler Inhale 2 puffs 2 (Two) Times a Day.      cadexomer iodine (IODOSORB) 0.9 % gel Apply 1 application  topically to the appropriate area as directed Daily.      famotidine (PEPCID) 20 MG tablet Take 1 tablet by mouth Daily.      furosemide (LASIX) 20 MG tablet Take 0.5 tablets by mouth Daily.      latanoprost (XALATAN) 0.005 % ophthalmic solution Administer 1 drop to both eyes.      metoprolol succinate XL (TOPROL-XL) 25 MG 24 hr tablet Take 1 tablet by mouth Daily. 90 tablet 2    Momelotinib Dihydrochloride (OJJAARA) 200 MG tablet Take 1 tablet by mouth Daily. 28 tablet 3    polyethylene glycol (MIRALAX) 17 g packet Take 17 g by mouth Daily As Needed (Use if senna-docusate is ineffective).      predniSONE (DELTASONE) 5 MG tablet Take 4 tablets by mouth Daily With Breakfast for 6 days, THEN 3 tablets Daily With Breakfast for 7 days, THEN 2 tablets Daily With Breakfast for 7 days, THEN 1 tablet Daily With Breakfast for 7 days. 66 tablet 0    saccharomyces boulardii (FLORASTOR) 250 MG capsule Take 1 capsule by mouth 2 (Two) Times a Day. 60 capsule 0    sertraline (ZOLOFT) 100 MG tablet Take 1 tablet by mouth Daily.      simethicone (MYLICON) 80 MG chewable tablet Chew 1 tablet by mouth 4 (Four) Times a Day As Needed for Flatulence. 100 tablet 0    tiotropium bromide monohydrate (SPIRIVA RESPIMAT) 2.5 MCG/ACT aerosol solution inhaler Inhale 2 puffs Daily.       Allergies   Allergen Reactions    Aspirin Unknown - Low Severity    Diphenhydramine Hcl Anxiety     Benadryl IVP    Hydroxyurea Other (See Comments)     Her hemoglobin drop and was stop in February  2022 and start taking Jakafi    Oxycodone Unknown - Low Severity       Objective    Objective     Vital Signs  Temp:  [96.9 °F (36.1 °C)-97.9 °F (36.6 °C)] 97.9 °F (36.6 °C)  Heart Rate:  [] 59  Resp:  [18] 18  BP: (104-136)/(47-72) 115/67  SpO2:  [92 %-100 %] 92 %  on  Flow (L/min):  [2-3] 2;   Device (Oxygen Therapy): nasal cannula  Body mass index is 24.8 kg/m².    Physical Exam  Constitutional:       General: She is not in acute distress.  HENT:      Head: Normocephalic and atraumatic.   Eyes:      Extraocular Movements: Extraocular movements intact.      Pupils: Pupils are equal, round, and reactive to light.   Cardiovascular:      Rate and Rhythm: Normal rate and regular rhythm.   Pulmonary:      Effort: Pulmonary effort is normal. No respiratory distress.      Comments: On 3L of O2 via NC  Abdominal:      General: There is no distension.      Palpations: Abdomen is soft.      Tenderness: There is no abdominal tenderness.   Musculoskeletal:      Right lower leg: No edema.      Left lower leg: No edema.   Skin:     General: Skin is warm and dry.   Neurological:      General: No focal deficit present.      Mental Status: She is alert and oriented to person, place, and time.         Results Review:  I reviewed the patient's new clinical results.  I reviewed the patient's new imaging results and agree with the interpretation.  I reviewed the patient's other test results and agree with the interpretation  I personally viewed and interpreted the patient's EKG/Telemetry data  Discussed with ED provider.    Lab Results (last 24 hours)       Procedure Component Value Units Date/Time    CBC & Differential [[id number]]  (Abnormal) Collected: 11/24/23 0002    Specimen: Blood Updated: 11/24/23 0018    Narrative:      The following orders were created for panel order CBC & Differential.  Procedure                               Abnormality         Status                     ---------                                -----------         ------                     CBC Auto Differential[[id number]]        Abnormal            Final result                 Please view results for these tests on the individual orders.    Comprehensive Metabolic Panel [[id number]]  (Abnormal) Collected: 11/24/23 0002    Specimen: Blood Updated: 11/24/23 0030     Glucose 210 mg/dL      BUN 28 mg/dL      Creatinine 0.87 mg/dL      Sodium 135 mmol/L      Potassium 4.4 mmol/L      Comment: Slight hemolysis detected by analyzer. Result may be falsely elevated.        Chloride 93 mmol/L      CO2 26.2 mmol/L      Calcium 8.7 mg/dL      Total Protein 6.2 g/dL      Albumin 3.7 g/dL      ALT (SGPT) 24 U/L      AST (SGOT) 28 U/L      Comment: Slight hemolysis detected by analyzer. Result may be falsely elevated.        Alkaline Phosphatase 209 U/L      Total Bilirubin 1.0 mg/dL      Globulin 2.5 gm/dL      A/G Ratio 1.5 g/dL      BUN/Creatinine Ratio 32.2     Anion Gap 15.8 mmol/L      eGFR 65.4 mL/min/1.73     Narrative:      GFR Normal >60  Chronic Kidney Disease <60  Kidney Failure <15    The GFR formula is only valid for adults with stable renal function between ages 18 and 70.    Lipase [[id number]]  (Abnormal) Collected: 11/24/23 0002    Specimen: Blood Updated: 11/24/23 0028     Lipase 7 U/L     Single High Sensitivity Troponin T [[id number]]  (Abnormal) Collected: 11/24/23 0002    Specimen: Blood Updated: 11/24/23 0028     HS Troponin T 33 ng/L     Narrative:      High Sensitive Troponin T Reference Range:  <14.0 ng/L- Negative Female for AMI  <22.0 ng/L- Negative Male for AMI  >=14 - Abnormal Female indicating possible myocardial injury.  >=22 - Abnormal Male indicating possible myocardial injury.   Clinicians would have to utilize clinical acumen, EKG, Troponin, and serial changes to determine if it is an Acute Myocardial Infarction or myocardial injury due to an underlying chronic condition.         Magnesium [[id number]]  (Normal) Collected: 11/24/23 0002  "   Specimen: Blood Updated: 11/24/23 0030     Magnesium 1.7 mg/dL     CBC Auto Differential [[id number]]  (Abnormal) Collected: 11/24/23 0002    Specimen: Blood Updated: 11/24/23 0018     WBC 63.14 10*3/mm3      RBC 3.13 10*6/mm3      Hemoglobin 9.4 g/dL      Hematocrit 29.3 %      MCV 93.6 fL      MCH 30.0 pg      MCHC 32.1 g/dL      RDW 19.4 %      RDW-SD 64.6 fl      MPV 12.7 fL      Platelets 232 10*3/mm3     Manual Differential [[id number]]  (Abnormal) Collected: 11/24/23 0002    Specimen: Blood Updated: 11/24/23 0111     Neutrophil % 85.0 %      Lymphocyte % 1.0 %      Monocyte % 8.0 %      Eosinophil % 1.0 %      Bands %  --     Comment: 1+          Metamyelocyte % 1.0 %      Myelocyte % 4.0 %      Neutrophils Absolute 53.67 10*3/mm3      Lymphocytes Absolute 0.63 10*3/mm3      Monocytes Absolute 5.05 10*3/mm3      Eosinophils Absolute 0.63 10*3/mm3      Anisocytosis Slight/1+     Poikilocytes Slight/1+     WBC Morphology Normal     Platelet Morphology Normal    Procalcitonin [[id number]]  (Normal) Collected: 11/24/23 0002    Specimen: Blood Updated: 11/24/23 0242     Procalcitonin 0.25 ng/mL     Narrative:      As a Marker for Sepsis (Non-Neonates):    1. <0.5 ng/mL represents a low risk of severe sepsis and/or septic shock.  2. >2 ng/mL represents a high risk of severe sepsis and/or septic shock.    As a Marker for Lower Respiratory Tract Infections that require antibiotic therapy:    PCT on Admission    Antibiotic Therapy       6-12 Hrs later    >0.5                Strongly Recommended  >0.25 - <0.5        Recommended   0.1 - 0.25          Discouraged              Remeasure/reassess PCT  <0.1                Strongly Discouraged     Remeasure/reassess PCT    As 28 day mortality risk marker: \"Change in Procalcitonin Result\" (>80% or <=80%) if Day 0 (or Day 1) and Day 4 values are available. Refer to http://[email or web address]-[email or web address]    Change in PCT <=80%  A decrease of PCT levels below or equal to 80% " defines a positive change in PCT test result representing a higher risk for 28-day all-cause mortality of patients diagnosed with severe sepsis for septic shock.    Change in PCT >80%  A decrease of PCT levels of more than 80% defines a negative change in PCT result representing a lower risk for 28-day all-cause mortality of patients diagnosed with severe sepsis or septic shock.       BNP [[id number]]  (Abnormal) Collected: 11/24/23 0002    Specimen: Blood Updated: 11/24/23 0253     proBNP 3,717.0 pg/mL     Narrative:      This assay is used as an aid in the diagnosis of individuals suspected of having heart failure. It can be used as an aid in the diagnosis of acute decompensated heart failure (ADHF) in patients presenting with signs and symptoms of ADHF to the emergency department (ED). In addition, NT-proBNP of <300 pg/mL indicates ADHF is not likely.    Age Range Result Interpretation  NT-proBNP Concentration (pg/mL:      <50             Positive            >450                   Gray                 300-450                    Negative             <300    50-75           Positive            >900                  Gray                300-900                  Negative            <300      >75             Positive            >1800                  Gray                300-1800                  Negative            <300    Urinalysis With Microscopic If Indicated (No Culture) - Urine, Clean Catch [[id number]]  (Normal) Collected: 11/24/23 0201    Specimen: Urine, Clean Catch Updated: 11/24/23 0230     Color, UA Yellow     Appearance, UA Clear     pH, UA 7.0     Specific Gravity, UA 1.015     Glucose, UA Negative     Ketones, UA Negative     Bilirubin, UA Negative     Blood, UA Negative     Protein, UA Negative     Leuk Esterase, UA Negative     Nitrite, UA Negative     Urobilinogen, UA 0.2 E.U./dL    Narrative:      Urine microscopic not indicated.    Blood Culture - Blood, Hand, Right [[id number]] Collected: 11/24/23  0201    Specimen: Blood from Hand, Right Updated: 11/24/23 0206    Respiratory Panel PCR w/COVID-19(SARS-CoV-2) DAMIAN/ARIANA/JAGDEEP/PAD/COR/ADRIANA In-House, NP Swab in UTM/VTM, 2 HR TAT - Swab, Nasopharynx [[id number]]  (Normal) Collected: 11/24/23 0201    Specimen: Swab from Nasopharynx Updated: 11/24/23 0400     ADENOVIRUS, PCR Not Detected     Coronavirus 229E Not Detected     Coronavirus HKU1 Not Detected     Coronavirus NL63 Not Detected     Coronavirus OC43 Not Detected     COVID19 Not Detected     Human Metapneumovirus Not Detected     Human Rhinovirus/Enterovirus Not Detected     Influenza A PCR Not Detected     Influenza B PCR Not Detected     Parainfluenza Virus 1 Not Detected     Parainfluenza Virus 2 Not Detected     Parainfluenza Virus 3 Not Detected     Parainfluenza Virus 4 Not Detected     RSV, PCR Not Detected     Bordetella pertussis pcr Not Detected     Bordetella parapertussis PCR Not Detected     Chlamydophila pneumoniae PCR Not Detected     Mycoplasma pneumo by PCR Not Detected    Narrative:      In the setting of a positive respiratory panel with a viral infection PLUS a negative procalcitonin without other underlying concern for bacterial infection, consider observing off antibiotics or discontinuation of antibiotics and continue supportive care. If the respiratory panel is positive for atypical bacterial infection (Bordetella pertussis, Chlamydophila pneumoniae, or Mycoplasma pneumoniae), consider antibiotic de-escalation to target atypical bacterial infection.    Blood Culture - Blood, Arm, Left [[id number]] Collected: 11/24/23 0241    Specimen: Blood from Arm, Left Updated: 11/24/23 0245    Lactic Acid, Plasma [[id number]]  (Abnormal) Collected: 11/24/23 0241    Specimen: Blood Updated: 11/24/23 0325     Lactate 5.2 mmol/L     Comprehensive Metabolic Panel [[id number]]  (Abnormal) Collected: 11/24/23 0736    Specimen: Blood Updated: 11/24/23 0806     Glucose 133 mg/dL      BUN 23 mg/dL      Creatinine  0.91 mg/dL      Sodium 139 mmol/L      Potassium 4.1 mmol/L      Chloride 89 mmol/L      CO2 27.2 mmol/L      Calcium 9.2 mg/dL      Total Protein 6.7 g/dL      Albumin 4.0 g/dL      ALT (SGPT) 24 U/L      AST (SGOT) 20 U/L      Alkaline Phosphatase 265 U/L      Total Bilirubin 1.1 mg/dL      Globulin 2.7 gm/dL      A/G Ratio 1.5 g/dL      BUN/Creatinine Ratio 25.3     Anion Gap 22.8 mmol/L      eGFR 62.0 mL/min/1.73     Narrative:      GFR Normal >60  Chronic Kidney Disease <60  Kidney Failure <15    The GFR formula is only valid for adults with stable renal function between ages 18 and 70.    CBC (No Diff) [[id number]]  (Abnormal) Collected: 11/24/23 0736    Specimen: Blood Updated: 11/24/23 0754     WBC 83.18 10*3/mm3      RBC 3.40 10*6/mm3      Hemoglobin 10.1 g/dL      Hematocrit 31.4 %      MCV 92.4 fL      MCH 29.7 pg      MCHC 32.2 g/dL      RDW 19.3 %      RDW-SD 63.2 fl      MPV 12.6 fL      Platelets 298 10*3/mm3     STAT Lactic Acid, Reflex [[id number]]  (Abnormal) Collected: 11/24/23 0756    Specimen: Blood Updated: 11/24/23 0821     Lactate 5.6 mmol/L             Results for orders placed or performed during the hospital encounter of 11/02/23   Blood Culture - Blood, Arm, Right    Specimen: Arm, Right; Blood   Result Value Ref Range    Blood Culture No growth at 5 days        Imaging Results (Last 24 Hours)       Procedure Component Value Units Date/Time    CT Angiogram Abdomen Pelvis [[id number]] Collected: 11/24/23 0125     Updated: 11/24/23 0125    Narrative:        Patient: MARIBELL RUGGIERO  Time Out: 01:24  Exam(s): CTA ABDOMEN + PELVIS     EXAM:    CT Angiography Abdomen and Pelvis With Intravenous Contrast    CLINICAL HISTORY:       Concern for acute aortic syndrome.    TECHNIQUE:    Axial computed tomographic angiography images of the abdomen and pelvis   with intravenous contrast.  CTDI is 37 mGy and DLP is 934 mGy-cm.  This   CT exam was performed according to the principle of ALARA (As Low As    Reasonably Achievable) by using one or more of the following dose   reduction techniques: automated exposure control, adjustment of the mA   and or kV according to patient size, and or use of iterative   reconstruction technique.    MIP reconstructed images were created and reviewed.    COMPARISON:    No relevant prior studies available.    FINDINGS:     VASCULATURE:    Aorta:  No acute findings.  No aortic aneurysm or dissection.    Celiac trunk and mesenteric arteries:  Mild atherosclerosis of the   origin of the celiac trunk, SMA and BETZY without significant stenosis.    Renal arteries:  No acute findings.  No occlusion or significant   stenosis.    Iliac arteries:  Moderate atherosclerosis of the bilateral common iliac   arteries, external iliac arteries and internal iliac arteries.  No   occlusion or significant stenosis.      Lung bases:  Unremarkable.  No mass.  No consolidation.     ABDOMEN:    Liver:  Nodular contour of the liver is concerning for cirrhosis.    Gallbladder and bile ducts:  Cholecystectomy.  No ductal dilation.    Pancreas:  Unremarkable.  No ductal dilation.  No mass.    Spleen:  Splenomegaly.  The spleen is heterogeneous which could   represent a perfusion anomaly.    Adrenals:  Unremarkable.  No mass.    Kidneys and ureters:  Unremarkable.  No hydronephrosis.  No solid mass.    Stomach and bowel:  Diverticulosis.  No obstruction.  No mucosal   thickening.     PELVIS:    Appendix:  No findings to suggest acute appendicitis.    Bladder:  Unremarkable.  No mass.    Reproductive:  Unremarkable as visualized.     ABDOMEN and PELVIS:    Intraperitoneal space:  Unremarkable.  No significant fluid collection.    No free air.    Bones joints:  There are degenerative changes of the spine.  No acute   fracture.  No dislocation.    Soft tissues:  Unremarkable.    Lymph nodes:  Unremarkable.  No enlarged lymph nodes.    IMPRESSION:       1.  No aortic aneurysm or dissection.  2.  Nodular contour of  the liver is concerning for cirrhosis.  3.  The spleen is heterogeneous which could represent a perfusion anomaly.    4.  Splenomegaly is nonspecific but concerning for portal hypertension.  5.  Diverticulosis.      Impression:          Electronically signed by Yuki Starr MD on 11-24-23 at 0124    CT Angiogram Chest [[id number]] Collected: 11/24/23 0122     Updated: 11/24/23 0122    Narrative:        Patient: MARIBELL RUGGIERO  Time Out: 01:22  Exam(s): CTA CHEST     EXAM:    CT Angiography Chest With Intravenous Contrast    CLINICAL HISTORY:       Concern for acute aortic syndrome.    TECHNIQUE:    Axial computed tomographic angiography images of the chest with   intravenous contrast.  CTDI is 37 mGy and DLP is 934 mGy-cm.  This CT   exam was performed according to the principle of ALARA (As Low As   Reasonably Achievable) by using one or more of the following dose   reduction techniques: automated exposure control, adjustment of the mA   and or kV according to patient size, and or use of iterative   reconstruction technique.    MIP reconstructed images were created and reviewed.    COMPARISON:    No relevant prior studies available.    FINDINGS:    Pulmonary arteries:  Unremarkable.  No pulmonary embolus.    Aorta:  Mild atherosclerosis.  No aortic aneurysm or dissection.    Lungs:  Diffuse bilateral airspace opacities are concerning for   multifocal pneumonia and or aspiration.  Cannot exclude superimposed   pulmonary edema.    Pleural space:  Unremarkable.  No significant effusion.  No   pneumothorax.    Heart:  Unremarkable.  No cardiomegaly.  No significant pericardial   effusion.  No evidence of RV dysfunction.    Bones joints:  There are degenerative changes of the spine.  No acute   fracture.    Soft tissues:  Unremarkable.    Lymph nodes:  Unremarkable.  No enlarged lymph nodes.    IMPRESSION:       1.  No aortic aneurysm or dissection.  2.  No pulmonary embolus.  3.  Diffuse bilateral airspace opacities  are concerning for multifocal   pneumonia and or aspiration.  Cannot exclude superimposed pulmonary edema.      Impression:          Electronically signed by Yuki Starr MD on 11-24-23 at 0122    XR Chest 1 View [[id number]] Collected: 11/24/23 0100     Updated: 11/24/23 0100    Narrative:        Patient: MARIBELL RUGGIERO  Time Out: 01:00  Exam(s): XR CXR 1 VIEW     EXAM:    XR Chest, 1 View    CLINICAL HISTORY:       Nausea and upper abd pain.    TECHNIQUE:    Frontal view of the chest.    COMPARISON:    Chest radiograph 11 20 2023    FINDINGS:    Lungs:  Significantly worsened bilateral airspace opacities.    Pleural space:  New small left pleural effusion.  No pneumothorax.    Heart:  Unremarkable.  No cardiomegaly.    Mediastinum:  Unremarkable.  Normal mediastinal contour.    Bones joints:  There are degenerative changes of the spine.  No acute   fracture.    IMPRESSION:       1.  Significantly worsened bilateral airspace opacities.  2.  New small left pleural effusion.      Impression:          Electronically signed by Yuki Starr MD on 11-24-23 at 0100            Results for orders placed during the hospital encounter of 05/03/23    Adult Transthoracic Echo Complete W/ Cont if Necessary Per Protocol    Interpretation Summary    Left ventricular systolic function is low normal. Left ventricular ejection fraction appears to be 51 - 55%.    Left ventricular wall thickness is consistent with mild septal asymmetric hypertrophy.    The left atrial cavity is moderately dilated.    Mild aortic valve stenosis is present.    Peak velocity of the flow distal to the aortic valve is 202 cm/s. Aortic valve maximum pressure gradient is 16 mmHg. Aortic valve mean pressure gradient is 9 mmHg. Aortic valve dimensionless index is 0.8 .    Estimated right ventricular systolic pressure from tricuspid regurgitation is markedly elevated (>55 mmHg). Calculated right ventricular systolic pressure from tricuspid regurgitation is  62 mmHg.    Severe pulmonary hypertension is present.    Mild dilation of the ascending aorta is present. 3.7 cm.    Mild to moderate mitral regurgitation is noted.      ECG 12 Lead Electrolyte Imbalance   Preliminary Result   HEART RATE= 109  bpm   RR Interval= 550  ms   CO Interval= 143  ms   P Horizontal Axis= 48  deg   P Front Axis= 57  deg   QRSD Interval= 82  ms   QT Interval= 393  ms   QTcB= 530  ms   QRS Axis= 14  deg   T Wave Axis= 138  deg   - ABNORMAL ECG -   Sinus tachycardia   Ventricular bigeminy   Nonspecific T abnormalities, lateral leads   Electronically Signed By:    Date and Time of Study: 2023-11-23 23:47:58      SCANNED - TELEMETRY     Final Result                 Assessment/Plan     Active Hospital Problems    Diagnosis  POA    **CAP (community acquired pneumonia) [J18.9]  Yes      Resolved Hospital Problems   No resolved problems to display.     Sepsis secondary to pneumonia  Lactic Acidosis   -Her white blood cell count is elevated, though this is partially due to the myelodysplastic syndrome.  Lactic acid was 5.2 initially.  I do not see that she received any fluid boluses.  -Administer 1000 cc of normal saline at 250 cc an hour and monitor closely for any respiratory compromise.  -She has been started on Unasyn which is an appropriate antibiotic for aspiration pneumonia, however going through her previous CT scans, it appears that the bilateral pulmonary infiltrates are not new findings and have been present persistently in the past.  -Cultures obtained and will need to be followed.  -From going through her previous hospitalization, the bilateral pulmonary infiltrates are not new findings.    Myelodysplastic syndrome  -Patient takes ojjaara.  Hold off for now.  Ask oncology to evaluate    Atrial fibrillation  -Continue Eliquis. Metoprolol as initiated as well.    Lung mass  Status post bronchoscopy on 11/8/2023 with biopsies.  Pathology showed no evidence of malignancy.  She was previously  on IV steroids for prolonged course during her previous hospitalization and has then been transitioned to oral steroids and was discharged today.  Starting to taper.  I will continue this taper for now.       I discussed the patient's findings and my recommendations with patient and nursing staff.    VTE Prophylaxis - Eliquis (home med).  Code Status - Full code.       Junior Doan MD  [facility]  11/24/23  09:43 EST

## 2023-11-25 NOTE — CONSULTS
[facility] evaluated 83-year-old female for depression.  Patient's sister-in-law was in room with patient's permission.  Patient denies any depression but does states she has increased anxiety that she is not used to.  Patient is currently on Zoloft 100 mg daily and 25 mg of as needed Atarax was just added by the hospital doctor.  Patient has not had any yet and Access let the nurse know the patient would like some.  Patient is in the hospital for double pneumonia and states her anxiety goes up when she is short of breath.  Patient lives at [facility] but had been in [facility] rehab for 1 day before she was sent to the hospital.  Patient denies any inpatient psychiatric care in her past.  Patient denies any SI and denies any past attempts.  Patient states she had some very brief grief counseling years ago after a family member .  Patient denies any use of alcohol drugs or tobacco.  Patient states her sleep is not normally very good but was good last night.  Patient's appetite has been off and on.    Patient lives at [facility] and has been  for several years.  Patient has 5 adult children 4 of whom are out of town.  Patient has 1 daughter here in town as well as a brother and sister-in-law who are her support system.  Patient denies any depressed mood but rates her anxiety as a 7/10.  Access will follow patient to see if the Atarax helps with her anxiety.  If it does not help, a psychiatrist consult may be placed.

## 2023-11-25 NOTE — PROGRESS NOTES
"[facility] Clinical Pharmacy Services: Vancomycin Pharmacokinetic Initial Consult Note    Catherine Boyer is a 83 y.o. female who is on day 1 of pharmacy to dose vancomycin.    Indication: Bacteremia  Consulting Provider: Dr. Doan  Planned Duration of Therapy: 5 days  Loading Dose Ordered or Given: 1250mg on 11/25 at 1000  MRSA PCR performed: Yes; Result: Positive  Culture/Source: Blood culture x2  Target: -600 mg/L.hr   Pertinent Vanc Dosing History: NA  Other Antimicrobials: Unasyn for CAP    Vitals/Labs  Ht: 157.5 cm (62\"); Wt: 61 kg (134 lb 8 oz)  Temp Readings from Last 1 Encounters:   11/25/23 97.5 °F (36.4 °C) (Oral)    Estimated Creatinine Clearance: 41.1 mL/min (by C-G formula based on SCr of 0.86 mg/dL).     Results from last 7 days   Lab Units 11/25/23  0246 11/24/23  0736 11/24/23  0002   CREATININE mg/dL 0.86 0.91 0.87   WBC 10*3/mm3 84.33* 83.18* 63.14*     Assessment/Plan:    Vancomycin Dose:  1000mg IV every 24 hours  Predictive AUC level for the dose ordered is 450mg/L.hr, which is within the target of 400-600 mg/L.hr  Vanc Trough has been ordered for 11/27 at 0930     Pharmacy will follow patient's kidney function and will adjust doses and obtain levels as necessary. Thank you for involving pharmacy in this patient's care. Please contact pharmacy with any questions or concerns.                           Darren Lala, [facility]  Clinical Pharmacist   "

## 2023-11-25 NOTE — CONSULTS
Referring Provider: Dr. Doan  Reason for Consultation: MRSA bacteremia    Subjective   History of present illness: This is a 82-year-old female with coronary artery disease type 2 diabetes, peptic ulcer disease and myelofibrosis on Jakafi who was admitted on  November 23 with nausea and vomiting.  Patient was admitted to the hospital at the beginning of October with dyspnea.  She was found to have diastolic heart failure.  Most recently she was admitted to the hospital from November 2 through 22 with abdominal pain.  That time she was found to have reticulonodular opacities bilaterally and underwent bronchoscopy.  Bacterial cultures were negative.  AFB and fungal cultures are negative to date.  Biopsies did not reveal any evidence of malignancy.  She was discharged on a prednisone taper per pulmonary medicine.  The day after admission the patient started experiencing nausea and vomiting and presented back to the emergency room.  Admission blood work revealed a white blood cell count of 83,000 with a procalcitonin of 0.25.  Repeat imaging of the CAT scan of the chest abdomen pelvis showed persistent bilateral infiltrates.  She was empirically started on Unasyn given concern for aspiration.  Blood cultures are growing MRSA in 1 of 2 blood culture sets and vancomycin was added to her regimen.    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     PUD (peptic ulcer disease)     TIA (transient ischemic attack)     Type 2 diabetes mellitus (HCC)      Upper GI bleed        Past Surgical History:   Procedure Laterality Date    BREAST LUMPECTOMY  1999    BRONCHOSCOPY Bilateral 11/8/2023    Procedure: BRONCHOSCOPY UNDER FLUORO WITH BAL,  BIOPSIES; ACETYLCYSTEIN 4ML GIVEN;  Surgeon: Raoul Alford MD;  Location: [facility] ENDOSCOPY;  Service: Pulmonary;  Laterality: Bilateral;  PRE- PULMONARY INFILTRATES  POST- SAME    CARDIAC CATHETERIZATION N/A 09/02/2022    Procedure: Coronary angiography;  Surgeon: Guero Verde MD;  Location: [facility] CATH INVASIVE LOCATION;  Service: Cardiovascular;  Laterality: N/A;    CARDIAC CATHETERIZATION N/A 09/02/2022    Procedure: Stent LEFTY coronary;  Surgeon: Guero Verde MD;  Location: [facility] CATH INVASIVE LOCATION;  Service: Cardiovascular;  Laterality: N/A;    CATARACT EXTRACTION  2011    CHOLECYSTECTOMY      CHOLECYSTECTOMY WITH INTRAOPERATIVE CHOLANGIOGRAM N/A 11/28/2022    Procedure: CHOLECYSTECTOMY LAPAROSCOPIC INTRAOPERATIVE CHOLANGIOGRAM;  Surgeon: Dani Grover Jr., MD;  Location: [facility] MAIN OR;  Service: General;  Laterality: N/A;    COLONOSCOPY N/A 02/09/2023    Procedure: COLONOSCOPY TO CECUM & T.I.;  Surgeon: Guero Ferris MD;  Location: [facility] ENDOSCOPY;  Service: Gastroenterology;  Laterality: N/A;  PRE- MELENA, GI BLEED  POST- DIVERTICULOSIS, INT HEMORRHOIDS    ENDOSCOPY N/A 01/25/2023    Procedure: ESOPHAGOGASTRODUODENOSCOPY WITH BIOPSIES;  Surgeon: Charles Diaz MD;  Location: [facility] ENDOSCOPY;  Service: Gastroenterology;  Laterality: N/A;  pre: abd pain, nausea  post: hiatal hernia, mild gastritis, sloughing of the esophagus    ENTEROSCOPY SMALL BOWEL N/A 02/09/2023    Procedure: ENTEROSCOPY SMALL BOWEL;  Surgeon: Guero Ferris MD;  Location: [facility] ENDOSCOPY;  Service: Gastroenterology;  Laterality: N/A;  PRE- MELENA, GI BLEED  POST- HIATAL HERNIA    JOINT REPLACEMENT      UPPER GASTROINTESTINAL ENDOSCOPY          reports that she quit smoking about 73 years ago. Her smoking use included  cigarettes. She has a 20.00 pack-year smoking history. She has been exposed to tobacco smoke. She has never used smokeless tobacco. She reports that she does not currently use alcohol. She reports that she does not use drugs.    family history includes Lung cancer in her father and sister; Ovarian cancer in her mother.    Allergies   Allergen Reactions    Aspirin Unknown - Low Severity    Diphenhydramine Hcl Anxiety     Benadryl IVP    Hydroxyurea Other (See Comments)     Her hemoglobin drop and was stop in February 2022 and start taking Jakafi    Oxycodone Unknown - Low Severity       Medication:  Antibiotics:  Unasyn 3 g IV every 6 hours  Vancomycin dosing per pharmacy    Please refer to the medical record for a full medication list    Review of Systems  Pertinent items are noted in HPI, all other systems reviewed and negative    Objective   Vital Signs   Temp:  [97.5 °F (36.4 °C)-99 °F (37.2 °C)] 97.8 °F (36.6 °C)  Heart Rate:  [] 81  Resp:  [18-20] 18  BP: (115-122)/(55-96) 116/96    Physical Exam:   General: Frail-appearing  HEENT: Normocephalic, atraumatic,  no scleral icterus.   Neck: Supple, trachea is midline  Cardiovascular: Normal rate, regular rhythm, normal S1 and S2, no murmurs, rubs, or gallops   Respiratory: Coarse breath sounds bilaterally  GI: Abdomen is soft, nontender, nondistended, positive bowel sounds bilaterally  Musculoskeletal:  no edema, tenderness or deformity  Skin: No rashes   Extremities: No E/C/C  Neurological: Alert and oriented, moving all 4 extremities  Psychiatric: Normal mood and affect     Results Review:   I reviewed the patient's new clinical results.  I reviewed the patient's new imaging results and agree with the interpretation.    Lab Results   Component Value Date    WBC 84.33 (C) 11/25/2023    HGB 8.5 (L) 11/25/2023    HCT 26.3 (L) 11/25/2023    MCV 92.6 11/25/2023     11/25/2023       Lab Results   Component Value Date    GLUCOSE 116 (H) 11/25/2023    BUN 22  11/25/2023    CREATININE 0.86 11/25/2023    EGFRIFNONA 71 07/09/2021    EGFRIFAFRI 81 07/09/2021    BCR 25.6 (H) 11/25/2023    CO2 29.0 11/25/2023    CALCIUM 8.0 (L) 11/25/2023    PROTENTOTREF 7.0 03/21/2023    ALBUMIN 3.5 11/25/2023    LABIL2 1.4 03/21/2023    AST 15 11/25/2023    ALT 18 11/25/2023     Procalcitonin 0.25 -> 0.14    Microbiology:  11/24 BCx MRSA 1/2   11/24 RVP neg    11/8 BAL cx neg    Radiology:  CT angiogram shows no aortic aneurysm or dissection no PE.  Diffuse bilateral opacities concerning for multifocal pneumonia and/or aspiration    CAT scan of the abdomen pelvis liver cirrhosis.  Status postcholecystectomy.  Normal pancreas.  Splenomegaly.  Normal kidneys ureters and adrenals.  Normal appendix and bladder.    Assessment & Plan   MRSA bacteremia  Myelofibrosis on Jakafi complicating above  Chronic leukocytosis due to above  Bilateral pulmonary infiltrates    Pulmonary infiltrates persist and are not new.  I guess it is possible the patient has recurrent aspiration events.  Would consider pulmonary consultation.  The patient underwent bronchoscopy on November 8 with cultures so far negative.  Discontinue empiric Unasyn.  Continue vancomycin dosing per pharmacy for MRSA bacteremia.  Will obtain repeat blood cultures x 2 tomorrow as well as an echocardiogram.    I discussed the patient's findings and my recommendations with patient and nursing staff

## 2023-11-25 NOTE — PROGRESS NOTES
Name: Catherine Boyer ADMIT: 2023   : 1940  PCP: Brett Jackson MD    MRN: [id number] LOS: 1 days   AGE/SEX: 83 y.o. female  ROOM: [facility]/     Subjective   Blood cultures with MRSA.   States she does not feel well. Lactic acid still elevated.     Objective   Objective   Vital Signs  Temp:  [97.5 °F (36.4 °C)-99 °F (37.2 °C)] 97.5 °F (36.4 °C)  Heart Rate:  [] 105  Resp:  [18-20] 20  BP: (115-130)/(55-67) 122/60  SpO2:  [72 %-97 %] 94 %  on  Flow (L/min):  [2] 2;   Device (Oxygen Therapy): nasal cannula  Body mass index is 24.6 kg/m².  Physical Exam  Constitutional:       General: She is not in acute distress.     Appearance: She is ill-appearing.   HENT:      Head: Normocephalic and atraumatic.   Cardiovascular:      Rate and Rhythm: Normal rate and regular rhythm.   Pulmonary:      Effort: Pulmonary effort is normal. No respiratory distress.   Abdominal:      General: There is no distension.      Palpations: Abdomen is soft.      Tenderness: There is no abdominal tenderness.   Neurological:      Mental Status: She is alert and oriented to person, place, and time.      Motor: Weakness present.         Results Review     I reviewed the patient's new clinical results.  Results from last 7 days   Lab Units 23  0246 23  0736 23  0002 23  0456   WBC 10*3/mm3 84.33* 83.18* 63.14* 63.75*   HEMOGLOBIN g/dL 8.5* 10.1* 9.4* 9.9*   PLATELETS 10*3/mm3 293 298 232 226     Results from last 7 days   Lab Units 23  0246 23  0736 23  0002 23  0601   SODIUM mmol/L 140 139 135* 139   POTASSIUM mmol/L 3.3* 4.1 4.4 3.8   CHLORIDE mmol/L 95* 89* 93* 94*   CO2 mmol/L 29.0 27.2 26.2 26.9   BUN mg/dL  28* 25*   CREATININE mg/dL 0.86 0.91 0.87 0.81   GLUCOSE mg/dL 116* 133* 210* 76   Estimated Creatinine Clearance: 41.1 mL/min (by C-G formula based on SCr of 0.86 mg/dL).  Results from last 7 days   Lab Units 23  0246 23  0736 23  0002  "11/22/23  0601   ALBUMIN g/dL 3.5 4.0 3.7 4.1   BILIRUBIN mg/dL 0.8 1.1 1.0  --    ALK PHOS U/L 190* 265* 209*  --    AST (SGOT) U/L 15 20 28  --    ALT (SGPT) U/L 18 24 24  --      Results from last 7 days   Lab Units 11/25/23  0246 11/24/23  0736 11/24/23  0002 11/22/23  0601 11/21/23  0603 11/20/23  0603 11/19/23  0501   CALCIUM mg/dL 8.0* 9.2 8.7 9.3 9.4 9.4 9.0   ALBUMIN g/dL 3.5 4.0 3.7 4.1 3.9 3.9 3.8   MAGNESIUM mg/dL  --   --  1.7  --   --   --   --    PHOSPHORUS mg/dL  --   --   --  2.8 2.9 2.6 2.7     Results from last 7 days   Lab Units 11/25/23  0246 11/24/23  1922 11/24/23  1315 11/24/23  1042 11/24/23  0241 11/24/23  0002   PROCALCITONIN ng/mL 0.14  --   --   --   --  0.25   LACTATE mmol/L 4.8* 6.0* 4.2* 5.2*   < >  --     < > = values in this interval not displayed.     COVID19   Date Value Ref Range Status   11/24/2023 Not Detected Not Detected - Ref. Range Final   11/08/2023 Not Detected Not Detected - Ref. Range Final     No results found for: \"HGBA1C\", \"POCGLU\"  Results for orders placed or performed during the hospital encounter of 11/23/23   Blood Culture - Blood, Arm, Left    Specimen: Arm, Left; Blood   Result Value Ref Range    Blood Culture Abnormal Stain (C)     Gram Stain Aerobic Bottle Gram positive cocci in clusters (C)          CT Angiogram Abdomen Pelvis  Narrative: Patient: MARIBELL RUGGIERO  Time Out: 01:24  Exam(s): CTA ABDOMEN + PELVIS     EXAM:    CT Angiography Abdomen and Pelvis With Intravenous Contrast    CLINICAL HISTORY:       Concern for acute aortic syndrome.    TECHNIQUE:    Axial computed tomographic angiography images of the abdomen and pelvis   with intravenous contrast.  CTDI is 37 mGy and DLP is 934 mGy-cm.  This   CT exam was performed according to the principle of ALARA (As Low As   Reasonably Achievable) by using one or more of the following dose   reduction techniques: automated exposure control, adjustment of the mA   and or kV according to patient size, and or " use of iterative   reconstruction technique.    MIP reconstructed images were created and reviewed.    COMPARISON:    No relevant prior studies available.    FINDINGS:     VASCULATURE:    Aorta:  No acute findings.  No aortic aneurysm or dissection.    Celiac trunk and mesenteric arteries:  Mild atherosclerosis of the   origin of the celiac trunk, SMA and BETZY without significant stenosis.    Renal arteries:  No acute findings.  No occlusion or significant   stenosis.    Iliac arteries:  Moderate atherosclerosis of the bilateral common iliac   arteries, external iliac arteries and internal iliac arteries.  No   occlusion or significant stenosis.      Lung bases:  Unremarkable.  No mass.  No consolidation.     ABDOMEN:    Liver:  Nodular contour of the liver is concerning for cirrhosis.    Gallbladder and bile ducts:  Cholecystectomy.  No ductal dilation.    Pancreas:  Unremarkable.  No ductal dilation.  No mass.    Spleen:  Splenomegaly.  The spleen is heterogeneous which could   represent a perfusion anomaly.    Adrenals:  Unremarkable.  No mass.    Kidneys and ureters:  Unremarkable.  No hydronephrosis.  No solid mass.    Stomach and bowel:  Diverticulosis.  No obstruction.  No mucosal   thickening.     PELVIS:    Appendix:  No findings to suggest acute appendicitis.    Bladder:  Unremarkable.  No mass.    Reproductive:  Unremarkable as visualized.     ABDOMEN and PELVIS:    Intraperitoneal space:  Unremarkable.  No significant fluid collection.    No free air.    Bones joints:  There are degenerative changes of the spine.  No acute   fracture.  No dislocation.    Soft tissues:  Unremarkable.    Lymph nodes:  Unremarkable.  No enlarged lymph nodes.    IMPRESSION:       1.  No aortic aneurysm or dissection.  2.  Nodular contour of the liver is concerning for cirrhosis.  3.  The spleen is heterogeneous which could represent a perfusion anomaly.    4.  Splenomegaly is nonspecific but concerning for portal  hypertension.  5.  Diverticulosis.  Impression: Electronically signed by Yuki Starr MD on 11-24-23 at 0124  CT Angiogram Chest  Narrative: Patient: MARIBELL RUGGIERO  Time Out: 01:22  Exam(s): CTA CHEST     EXAM:    CT Angiography Chest With Intravenous Contrast    CLINICAL HISTORY:       Concern for acute aortic syndrome.    TECHNIQUE:    Axial computed tomographic angiography images of the chest with   intravenous contrast.  CTDI is 37 mGy and DLP is 934 mGy-cm.  This CT   exam was performed according to the principle of ALARA (As Low As   Reasonably Achievable) by using one or more of the following dose   reduction techniques: automated exposure control, adjustment of the mA   and or kV according to patient size, and or use of iterative   reconstruction technique.    MIP reconstructed images were created and reviewed.    COMPARISON:    No relevant prior studies available.    FINDINGS:    Pulmonary arteries:  Unremarkable.  No pulmonary embolus.    Aorta:  Mild atherosclerosis.  No aortic aneurysm or dissection.    Lungs:  Diffuse bilateral airspace opacities are concerning for   multifocal pneumonia and or aspiration.  Cannot exclude superimposed   pulmonary edema.    Pleural space:  Unremarkable.  No significant effusion.  No   pneumothorax.    Heart:  Unremarkable.  No cardiomegaly.  No significant pericardial   effusion.  No evidence of RV dysfunction.    Bones joints:  There are degenerative changes of the spine.  No acute   fracture.    Soft tissues:  Unremarkable.    Lymph nodes:  Unremarkable.  No enlarged lymph nodes.    IMPRESSION:       1.  No aortic aneurysm or dissection.  2.  No pulmonary embolus.  3.  Diffuse bilateral airspace opacities are concerning for multifocal   pneumonia and or aspiration.  Cannot exclude superimposed pulmonary edema.  Impression: Electronically signed by Yuki Starr MD on 11-24-23 at 0122  XR Chest 1 View  Narrative: Patient: MARIBELL RUGGIERO  Time Out: 01:00  Exam(s):  XR CXR 1 VIEW     EXAM:    XR Chest, 1 View    CLINICAL HISTORY:       Nausea and upper abd pain.    TECHNIQUE:    Frontal view of the chest.    COMPARISON:    Chest radiograph 11 20 2023    FINDINGS:    Lungs:  Significantly worsened bilateral airspace opacities.    Pleural space:  New small left pleural effusion.  No pneumothorax.    Heart:  Unremarkable.  No cardiomegaly.    Mediastinum:  Unremarkable.  Normal mediastinal contour.    Bones joints:  There are degenerative changes of the spine.  No acute   fracture.    IMPRESSION:       1.  Significantly worsened bilateral airspace opacities.  2.  New small left pleural effusion.  Impression: Electronically signed by Yuki Starr MD on 11-24-23 at 0100    Scheduled Medications  ampicillin-sulbactam, 3 g, Intravenous, Q6H  apixaban, 5 mg, Oral, BID  aspirin, 81 mg, Oral, Daily  Eucerin original healing lotion, , Topical, Q12H  famotidine, 20 mg, Oral, Daily  lactated ringers, 1,000 mL, Intravenous, Once  Menthol-Zinc Oxide, 1 application , Topical, Q12H  metoprolol succinate XL, 25 mg, Oral, Daily  predniSONE, 20 mg, Oral, Daily With Breakfast   Followed by  [START ON 11/29/2023] predniSONE, 15 mg, Oral, Daily With Breakfast   Followed by  [START ON 12/6/2023] predniSONE, 10 mg, Oral, Daily With Breakfast   Followed by  [START ON 12/13/2023] predniSONE, 5 mg, Oral, Daily With Breakfast  senna-docusate sodium, 2 tablet, Oral, BID  sertraline, 100 mg, Oral, Daily  tiotropium bromide monohydrate, 2 puff, Inhalation, Daily - RT  [START ON 11/26/2023] vancomycin, 1,000 mg, Intravenous, Q24H  vancomycin, 20 mg/kg, Intravenous, Once    Infusions  Pharmacy to dose vancomycin,     Diet  Diet: Cardiac Diets, Diabetic Diets; Healthy Heart (2-3 Na+); Consistent Carbohydrate; Texture: Regular Texture (IDDSI 7); Fluid Consistency: Thin (IDDSI 0)       Assessment/Plan     Active Hospital Problems    Diagnosis  POA    **CAP (community acquired pneumonia) [J18.9]  Yes     Myeloproliferative disorder [D47.1]  Yes      Resolved Hospital Problems   No resolved problems to display.       83 y.o. female admitted with CAP (community acquired pneumonia).      Sepsis secondary to MRSA bacteremia   Lactic Acidosis   -Her white blood cell count is elevated, though this is partially due to the myelodysplastic syndrome.  Lactic acid was 5.2 initially.  -Administer 1000 cc of normal saline at 250 cc an hour and monitor closely for any respiratory compromise.  -She has been started on Unasyn which is an appropriate antibiotic for aspiration pneumonia, however going through her previous CT scans, it appears that the bilateral pulmonary infiltrates are not new findings and have been present persistently in the past.  -Blood culture x1 with MRSA. Start vancomyin and consult ID  -From going through her previous hospitalization, the bilateral pulmonary infiltrates are not new findings.  -ID consulted      Myelodysplastic syndrome  -Patient takes ojjaara. Oncology following, hold for now     Atrial fibrillation  -Continue Eliquis, metoprolol      Lung mass  Status post bronchoscopy on 11/8/2023 with biopsies.  Pathology showed no evidence of malignancy.  She was previously on IV steroids for prolonged course during her previous hospitalization and has then been transitioned to oral steroids and was discharged today.  Starting to taper.  I will continue this taper for now.       Eliquis for DVT prophylaxis.  Full code.  Discussed with patient and nursing staff.  Anticipate discharge to SNU facility timing yet to be determined.      Junior Doan MD  [facility]  11/25/23  10:13 EST

## 2023-11-25 NOTE — PLAN OF CARE
Goal Outcome Evaluation:            VS WNL.Lactate up to 6.0 this shift,500CC bolus of N/S ordered per [facility].Lactate back to 4.8. BX +ve for gram positive cocci in clusters,[facility] notified.Pt A&O*4,continues on IV ABT,sats stable on 2L VIA N/C.Denies N/V or abdominal discomfort this shift.WCTM          0615: Complaints of Nausea.PRN zofran given          0651: Emesis *1 ,[facility] NP aware of low k & +ve BX, awaiting orders.

## 2023-11-26 NOTE — PROGRESS NOTES
Subjective     HISTORY OF PRESENT ILLNESS:   No acute issues overnight.  Patient reports of having nausea and heartburn this morning.  Afebrile.  Has low blood pressure this morning.    Past Medical History, Past Surgical History, Social History, Family History have been reviewed and are without significant changes except as mentioned.    Review of Systems   A comprehensive 14 point review of systems was performed and was negative except as mentioned.    Medications:  The current medication list was reviewed in the EMR    ALLERGIES:    Allergies   Allergen Reactions    Aspirin Unknown - Low Severity    Diphenhydramine Hcl Anxiety     Benadryl IVP    Hydroxyurea Other (See Comments)     Her hemoglobin drop and was stop in February 2022 and start taking Jakafi    Oxycodone Unknown - Low Severity       Objective      Vitals:    11/25/23 1322 11/25/23 1924 11/25/23 2319 11/26/23 0539   BP: 116/96 120/50 94/48    BP Location: Left arm Left arm Left arm    Patient Position: Lying Lying Lying    Pulse: 81 104 93    Resp: 18 18 18    Temp: 97.8 °F (36.6 °C) 98.1 °F (36.7 °C) 98.6 °F (37 °C)    TempSrc: Oral Oral Oral    SpO2: 98% 93% 96%    Weight:    60.6 kg (133 lb 8 oz)   Height:             10/18/2023     4:02 PM   Current Status   ECOG score 2       Physical Exam    CONSTITUTIONAL:  Vital signs reviewed.  No distress, looks comfortable.  EYES:  Conjunctiva and lids unremarkable.    EARS,NOSE,MOUTH,THROAT:  Ears and nose appear unremarkable.    RESPIRATORY:  Normal respiratory effort.  CARDIOVASCULAR:  Normal heart rate  GASTROINTESTINAL: Abdomen appears unremarkable.  Nondistended   LYMPHATIC:  No cervical, supraclavicular lymphadenopathy.  SKIN:  Warm.  No rashes.  PSYCHIATRIC:  Normal judgment and insight.  Normal mood and affect.  NEURO: AAOx3, no obvious focal deficits.      RECENT LABS:  Hematology WBC   Date Value Ref Range Status   11/26/2023 87.18 (C) 3.40 - 10.80 10*3/mm3 Final     RBC   Date Value Ref Range  Status   11/26/2023 2.67 (L) 3.77 - 5.28 10*6/mm3 Final     Hemoglobin   Date Value Ref Range Status   11/26/2023 8.1 (L) 12.0 - 15.9 g/dL Final     Hematocrit   Date Value Ref Range Status   11/26/2023 24.9 (L) 34.0 - 46.6 % Final     Platelets   Date Value Ref Range Status   11/26/2023 291 140 - 450 10*3/mm3 Final              Assessment & Plan   Patient is a pleasant 83-year-old female with medical history significant for JAK2 positive myeloproliferative disorder, perhaps primary myelofibrosis, A-fib on chronic Eliquis, CAD with stents, history of endocarditis and TIA admitted with nausea and vomiting.       #Nausea and vomiting due to suspected sepsis and pneumonia related:  Patient had presented to [facility] ER on 11/23/2023 with nausea and vomiting.  Patient was discharged just a day before after prolonged hospitalization for ESBL UTI, acute renal failure and respiratory distress.  Patient states that she initially reached the rehab facility, she started to feel bad.  She developed nausea and vomiting and this was brought back to the hospital.  Work-up in the ER with CT scans did not show any acute abnormalities.  Persistent bilateral pulmonary opacities, concerning for multifocal pneumonia and/or aspiration.  Elevated lactic acid level of 5.2  11/26: Continues to have N/V.  Started on IV Protonix daily.  Encouraged to ask for Zofran.  Lactate improved to 4.8.  Continue IV fluids and antibiotics as per primary. 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  This is likely primary myelofibrosis +/-steroids and infection related  Will hold Ojjara while inpatient as it can be associated with nausea and vomiting     #Leukocytosis: As above    #MRSA bacteremia:  ID is on board.  On broad-spectrum antibiotics.  Plan for echocardiogram to evaluate for endocarditis noted.      #Liver cirrhosis and  splenomegaly     #Chronic hypoxic respiratory failure:  She was evaluated with bronchoscopy with biopsies on 11/8/2023.  No evidence of malignancy.  Continue with gradual steroids taper  Antibiotics as above     #History of A-fib on Eliquis     Recommendations:  -Hold Ojjara while inpatient.  -Started on IV Protonix  -Continue antibiotics and IV fluids as per primary  -Continue gradual taper of steroids  -Will follow in periphery.     She has an appointment scheduled with our office on 12/20/2023.  Please call us with any questions

## 2023-11-26 NOTE — NURSING NOTE
Access consulted and following d/t depression.     Patient sleeping this morning and unable to rouse. Reviewed chart and spoke with RN who states patient given atarax at 2333 and has slept tonight. No issues reported.     Access following.

## 2023-11-26 NOTE — PROGRESS NOTES
Name: Catherine Boyer ADMIT: 2023   : 1940  PCP: Brett Jackson MD    MRN: [id number] LOS: 2 days   AGE/SEX: 83 y.o. female  ROOM: [facility]     Subjective     She was complaining of chest pain this morning.  EKG was obtained which did not show any remarkable changes from previous EKGs.  Troponin was slightly elevated at 30.    Objective   Objective   Vital Signs  Temp:  [97.5 °F (36.4 °C)-98.6 °F (37 °C)] 97.5 °F (36.4 °C)  Heart Rate:  [] 112  Resp:  [18] 18  BP: ()/(48-70) 87/70  SpO2:  [91 %-96 %] 91 %  on  Flow (L/min):  [2] 2;   Device (Oxygen Therapy): nasal cannula  Body mass index is 24.42 kg/m².  Physical Exam  Constitutional:       General: She is not in acute distress.     Appearance: She is ill-appearing.   HENT:      Head: Normocephalic and atraumatic.   Cardiovascular:      Rate and Rhythm: Normal rate and regular rhythm.   Pulmonary:      Effort: Pulmonary effort is normal. No respiratory distress.   Abdominal:      General: There is no distension.      Palpations: Abdomen is soft.      Tenderness: There is no abdominal tenderness.   Neurological:      Mental Status: She is alert and oriented to person, place, and time.      Motor: Weakness present.         Results Review     I reviewed the patient's new clinical results.  Results from last 7 days   Lab Units 23  0002   WBC 10*3/mm3 87.18* 84.33* 83.18* 63.14*   HEMOGLOBIN g/dL 8.1* 8.5* 10.1* 9.4*   PLATELETS 10*3/mm3 291 293 298 232     Results from last 7 days   Lab Units 2336 23  0002   SODIUM mmol/L 139 140 139 135*   POTASSIUM mmol/L 2.7* 3.3* 4.1 4.4   CHLORIDE mmol/L 97* 95* 89* 93*   CO2 mmol/L 27.1 29.0 27.2 26.2   BUN mg/dL 15 22 23 28*   CREATININE mg/dL 0.76 0.86 0.91 0.87   GLUCOSE mg/dL 65 116* 133* 210*   Estimated Creatinine Clearance: 46.4 mL/min (by C-G formula based on SCr of 0.76 mg/dL).  Results from last  "7 days   Lab Units 11/25/23  0246 11/24/23  0736 11/24/23  0002 11/22/23  0601   ALBUMIN g/dL 3.5 4.0 3.7 4.1   BILIRUBIN mg/dL 0.8 1.1 1.0  --    ALK PHOS U/L 190* 265* 209*  --    AST (SGOT) U/L 15 20 28  --    ALT (SGPT) U/L 18 24 24  --      Results from last 7 days   Lab Units 11/26/23  0338 11/25/23  0246 11/24/23  0736 11/24/23  0002 11/22/23  0601 11/21/23  0603 11/20/23  0603   CALCIUM mg/dL 8.3* 8.0* 9.2 8.7 9.3 9.4 9.4   ALBUMIN g/dL  --  3.5 4.0 3.7 4.1 3.9 3.9   MAGNESIUM mg/dL  --  1.9  --  1.7  --   --   --    PHOSPHORUS mg/dL  --  4.3  --   --  2.8 2.9 2.6     Results from last 7 days   Lab Units 11/25/23  0246 11/24/23  1922 11/24/23  1315 11/24/23  1042 11/24/23  0241 11/24/23  0002   PROCALCITONIN ng/mL 0.14  --   --   --   --  0.25   LACTATE mmol/L 4.8* 6.0* 4.2* 5.2*   < >  --     < > = values in this interval not displayed.     COVID19   Date Value Ref Range Status   11/24/2023 Not Detected Not Detected - Ref. Range Final   11/08/2023 Not Detected Not Detected - Ref. Range Final     No results found for: \"HGBA1C\", \"POCGLU\"  Results for orders placed or performed during the hospital encounter of 11/23/23   Blood Culture - Blood, Arm, Left    Specimen: Arm, Left; Blood   Result Value Ref Range    Blood Culture Staphylococcus aureus, MRSA (C)     Isolated from Aerobic Bottle     Gram Stain Aerobic Bottle Gram positive cocci in clusters (C)          CT Angiogram Abdomen Pelvis  Narrative: Patient: MARIBELL RUGGIERO  Time Out: 01:24  Exam(s): CTA ABDOMEN + PELVIS     EXAM:    CT Angiography Abdomen and Pelvis With Intravenous Contrast    CLINICAL HISTORY:       Concern for acute aortic syndrome.    TECHNIQUE:    Axial computed tomographic angiography images of the abdomen and pelvis   with intravenous contrast.  CTDI is 37 mGy and DLP is 934 mGy-cm.  This   CT exam was performed according to the principle of ALARA (As Low As   Reasonably Achievable) by using one or more of the following dose "   reduction techniques: automated exposure control, adjustment of the mA   and or kV according to patient size, and or use of iterative   reconstruction technique.    MIP reconstructed images were created and reviewed.    COMPARISON:    No relevant prior studies available.    FINDINGS:     VASCULATURE:    Aorta:  No acute findings.  No aortic aneurysm or dissection.    Celiac trunk and mesenteric arteries:  Mild atherosclerosis of the   origin of the celiac trunk, SMA and BETZY without significant stenosis.    Renal arteries:  No acute findings.  No occlusion or significant   stenosis.    Iliac arteries:  Moderate atherosclerosis of the bilateral common iliac   arteries, external iliac arteries and internal iliac arteries.  No   occlusion or significant stenosis.      Lung bases:  Unremarkable.  No mass.  No consolidation.     ABDOMEN:    Liver:  Nodular contour of the liver is concerning for cirrhosis.    Gallbladder and bile ducts:  Cholecystectomy.  No ductal dilation.    Pancreas:  Unremarkable.  No ductal dilation.  No mass.    Spleen:  Splenomegaly.  The spleen is heterogeneous which could   represent a perfusion anomaly.    Adrenals:  Unremarkable.  No mass.    Kidneys and ureters:  Unremarkable.  No hydronephrosis.  No solid mass.    Stomach and bowel:  Diverticulosis.  No obstruction.  No mucosal   thickening.     PELVIS:    Appendix:  No findings to suggest acute appendicitis.    Bladder:  Unremarkable.  No mass.    Reproductive:  Unremarkable as visualized.     ABDOMEN and PELVIS:    Intraperitoneal space:  Unremarkable.  No significant fluid collection.    No free air.    Bones joints:  There are degenerative changes of the spine.  No acute   fracture.  No dislocation.    Soft tissues:  Unremarkable.    Lymph nodes:  Unremarkable.  No enlarged lymph nodes.    IMPRESSION:       1.  No aortic aneurysm or dissection.  2.  Nodular contour of the liver is concerning for cirrhosis.  3.  The spleen is  heterogeneous which could represent a perfusion anomaly.    4.  Splenomegaly is nonspecific but concerning for portal hypertension.  5.  Diverticulosis.  Impression: Electronically signed by Yuki Starr MD on 11-24-23 at 0124  CT Angiogram Chest  Narrative: Patient: MARIBELL RUGGIERO  Time Out: 01:22  Exam(s): CTA CHEST     EXAM:    CT Angiography Chest With Intravenous Contrast    CLINICAL HISTORY:       Concern for acute aortic syndrome.    TECHNIQUE:    Axial computed tomographic angiography images of the chest with   intravenous contrast.  CTDI is 37 mGy and DLP is 934 mGy-cm.  This CT   exam was performed according to the principle of ALARA (As Low As   Reasonably Achievable) by using one or more of the following dose   reduction techniques: automated exposure control, adjustment of the mA   and or kV according to patient size, and or use of iterative   reconstruction technique.    MIP reconstructed images were created and reviewed.    COMPARISON:    No relevant prior studies available.    FINDINGS:    Pulmonary arteries:  Unremarkable.  No pulmonary embolus.    Aorta:  Mild atherosclerosis.  No aortic aneurysm or dissection.    Lungs:  Diffuse bilateral airspace opacities are concerning for   multifocal pneumonia and or aspiration.  Cannot exclude superimposed   pulmonary edema.    Pleural space:  Unremarkable.  No significant effusion.  No   pneumothorax.    Heart:  Unremarkable.  No cardiomegaly.  No significant pericardial   effusion.  No evidence of RV dysfunction.    Bones joints:  There are degenerative changes of the spine.  No acute   fracture.    Soft tissues:  Unremarkable.    Lymph nodes:  Unremarkable.  No enlarged lymph nodes.    IMPRESSION:       1.  No aortic aneurysm or dissection.  2.  No pulmonary embolus.  3.  Diffuse bilateral airspace opacities are concerning for multifocal   pneumonia and or aspiration.  Cannot exclude superimposed pulmonary edema.  Impression: Electronically signed by  Yuki Starr MD on 11-24-23 at 0122  XR Chest 1 View  Narrative: Patient: MARIBELL RUGGIERO  Time Out: 01:00  Exam(s): XR CXR 1 VIEW     EXAM:    XR Chest, 1 View    CLINICAL HISTORY:       Nausea and upper abd pain.    TECHNIQUE:    Frontal view of the chest.    COMPARISON:    Chest radiograph 11 20 2023    FINDINGS:    Lungs:  Significantly worsened bilateral airspace opacities.    Pleural space:  New small left pleural effusion.  No pneumothorax.    Heart:  Unremarkable.  No cardiomegaly.    Mediastinum:  Unremarkable.  Normal mediastinal contour.    Bones joints:  There are degenerative changes of the spine.  No acute   fracture.    IMPRESSION:       1.  Significantly worsened bilateral airspace opacities.  2.  New small left pleural effusion.  Impression: Electronically signed by Yuki Starr MD on 11-24-23 at 0100    Scheduled Medications  apixaban, 5 mg, Oral, BID  aspirin, 81 mg, Oral, Daily  Eucerin original healing lotion, , Topical, Q12H  lactated ringers, 1,000 mL, Intravenous, Once  Menthol-Zinc Oxide, 1 application , Topical, Q12H  metoprolol succinate XL, 25 mg, Oral, Daily  pantoprazole, 40 mg, Intravenous, Q AM  potassium chloride ER, 40 mEq, Oral, Q4H  predniSONE, 20 mg, Oral, Daily With Breakfast   Followed by  [START ON 11/29/2023] predniSONE, 15 mg, Oral, Daily With Breakfast   Followed by  [START ON 12/6/2023] predniSONE, 10 mg, Oral, Daily With Breakfast   Followed by  [START ON 12/13/2023] predniSONE, 5 mg, Oral, Daily With Breakfast  senna-docusate sodium, 2 tablet, Oral, BID  sertraline, 100 mg, Oral, Daily  tiotropium bromide monohydrate, 2 puff, Inhalation, Daily - RT  vancomycin, 1,000 mg, Intravenous, Q24H    Infusions  Pharmacy to dose vancomycin,     Diet  Diet: Cardiac Diets, Diabetic Diets; Healthy Heart (2-3 Na+); Consistent Carbohydrate; Texture: Regular Texture (IDDSI 7); Fluid Consistency: Thin (IDDSI 0)       Assessment/Plan     Active Hospital Problems    Diagnosis  POA     **CAP (community acquired pneumonia) [J18.9]  Yes    Myeloproliferative disorder [D47.1]  Yes      Resolved Hospital Problems   No resolved problems to display.       83 y.o. female admitted with CAP (community acquired pneumonia).    Chest pain  Troponin elevated to 30.  We will need to repeat.  EKG without any significant changes from previous EKG.  Trend troponin.  If it remains elevated we will need to consult cardiology      Sepsis secondary to MRSA bacteremia   Lactic Acidosis   -Her white blood cell count is elevated, though this is partially due to the myelodysplastic syndrome.  Lactic acid was 5.2 initially.  -Administer 1000 cc of normal saline at 250 cc an hour and monitor closely for any respiratory compromise.  -She has been started on Unasyn which is an appropriate antibiotic for aspiration pneumonia, however going through her previous CT scans, it appears that the bilateral pulmonary infiltrates are not new findings and have been present persistently in the past.  -Blood culture x1 with MRSA. Start vancomyin and consult ID  -From going through her previous hospitalization, the bilateral pulmonary infiltrates are not new findings.  -ID consulted.  She will need echocardiogram     Myelodysplastic syndrome  -Patient takes ojjaara. Oncology following, hold for now     Atrial fibrillation  -Continue Eliquis, metoprolol      Lung mass  Status post bronchoscopy on 11/8/2023 with biopsies.  Pathology showed no evidence of malignancy.  She was previously on IV steroids for prolonged course during her previous hospitalization and has then been transitioned to oral steroids and was discharged today.  Starting to taper.  I will continue this taper for now.       Eliquis for DVT prophylaxis.  Full code.  Discussed with patient and nursing staff.  Anticipate discharge to SNU facility timing yet to be determined.      Junior Doan MD  [facility]  11/26/23  13:51 EST

## 2023-11-26 NOTE — PROGRESS NOTES
LOS: 2 days     Chief Complaint:  MRSA bacteremia     Interval History: Afebrile, does not feel is a good today with an episode of vomiting and nausea.  No diarrhea or rash.    Vital Signs  Temp:  [97.5 °F (36.4 °C)-98.6 °F (37 °C)] 97.5 °F (36.4 °C)  Heart Rate:  [] 112  Resp:  [18] 18  BP: ()/(48-96) 116/56    Physical Exam:  General: Chronically ill-appearing  Cardiovascular: Tachycardic, no lower extremity edema  Respiratory: breathing comfortably on room air  GI: Soft, NT/ND  Skin: No rashes    Antibiotics:  Vancomycin dosing per pharmacy      Results Review:    Lab Results   Component Value Date    WBC 87.18 (C) 11/26/2023    HGB 8.1 (L) 11/26/2023    HCT 24.9 (L) 11/26/2023    MCV 93.3 11/26/2023     11/26/2023     Lab Results   Component Value Date    GLUCOSE 65 11/26/2023    BUN 15 11/26/2023    CREATININE 0.76 11/26/2023    EGFRIFNONA 71 07/09/2021    EGFRIFAFRI 81 07/09/2021    BCR 19.7 11/26/2023    CO2 27.1 11/26/2023    CALCIUM 8.3 (L) 11/26/2023    PROTENTOTREF 7.0 03/21/2023    ALBUMIN 3.5 11/25/2023    LABIL2 1.4 03/21/2023    AST 15 11/25/2023    ALT 18 11/25/2023     Procalcitonin 0.25 -> 0.14     Microbiology:  11/24 BCx MRSA 1/2 11/24 RVP neg     11/8 BAL cx neg    Assessment & Plan   MRSA bacteremia  Myelofibrosis on Jakafi complicating above  Chronic leukocytosis due to above  Bilateral pulmonary infiltrates    Will obtain repeat blood cultures x 2 today.  Will order an echocardiogram to rule out endocarditis.  Continue vancomycin dosing per pharmacy.  Consider pulmonary consultation given persistence of bilateral pulmonary infiltrates.

## 2023-11-26 NOTE — PLAN OF CARE
Goal Outcome Evaluation:               Pt slept through the night,Sats mostly stable on 2L via N/C. WBC up to 87K  & K 2.7 with AM labs.Called out to A, awaiting response.WCTM.

## 2023-11-27 PROBLEM — R78.81 MRSA BACTEREMIA: Status: ACTIVE | Noted: 2023-11-27

## 2023-11-27 PROBLEM — B95.62 MRSA BACTEREMIA: Status: ACTIVE | Noted: 2023-11-27

## 2023-11-27 NOTE — CONSULTS
Date of Hospital Visit: 23  Encounter Provider: Marimar Fink MD  Place of Service: [facility] CARDIOLOGY  Patient Name: Catherine Boyer  :1940  Referral Provider: No ref. provider found    Chief complaint nausea, vomiting and abb pain     History of Present Illness:    Catherine Boyer is a 83 y.o. female pt of Dr. Barriga with a history of HTN, HLD, Myeloproliferative disorder,HTN, DM II PAF, chronic HFpEF, chronic hypoxic respiratory failure on 3 liter nasal cannula at home,  endocarditis - marantic veg, TIA, CAD,  of RCA, s/p intervention to LAD and diagonal in .     In 2021 she was found to have a mitral valve mass and was diagnosed with marantic endocarditis after blood cultures were negative.     Pt presented in  with chest pain and was found to have in stent restenosis of diagonal and underwent stenting.     Pt has had recurrent problems with anemia requiring blood transfusions. Pt was discharged on 10/2/23 after being admitted with dyspnea, acute on chronic CHF and UTI.     Pt was seen in hospital follow up on 10/18/23 and was noted to be in SR but tachycardic . Her metoprolol succinate was increased to 25 mg daily.     Pt presented to ER on 23 with complaints of nausea, vomiting. Pt was recently admitted with a prolonged hospital stay that was complicated with ESBL, UTI, acute renal failure, and a lung mass that required a bronchoscopy and was then complicated by an iatrogenic pneumothorax. Bronchoscopy did not show any malignancy or any evidence of interstitial lung disease. We were consulted during this admission for chest pain. Her troponins were slightly elevated but flat. HGB was 6.8. It was felt her chest pain was related to anemia. She also had some episodes of Afib and metoprolol succinate 12.5 mg was added at night in addition to her 25 mg in am.     The day after discharge she started to have nausea and vomiting and presented  back here. In ER, a CTA of chest/ABD/pelvis was negative for aneurysm, dissection, PE, and showed Diffuse bilateral airspace opacities concerning for multifocal pneumonia or aspiration.  Some findings consistent with cirrhosis were noted along with some splenomegaly. WBC 63.14, lactic acid 5.2, normal renal function, proBNP 3700 and troponin T 33. Pt was admitted for community acquired PNA.       Oncology following for management of primary myelofibrosis. Psychiatry following for depression and increased anxiety. Infectious disease following for MRSA bacteremia.     I have been asked to see for to rule out endocarditis.     She has no chest pain or pressure.  She is minimally short winded.  She has a slight cough.  She has had heartburn.  Her abdominal pain and nausea are better.  She has no diarrhea.  She does not feel her heart racing or skipping.  She has no dizziness or headache.      ECHO 11/27/23   Left ventricular systolic function is normal. Calculated left ventricular EF = 55.3% Normal left ventricular cavity size noted. Left ventricular wall thickness is consistent with moderate concentric hypertrophy. All left ventricular wall segments contract normally. Left ventricular diastolic function is consistent with (grade II w/high LAP) pseudonormalization.    The left atrial cavity is moderately dilated. Left atrial volume is underestimated  The right atrial cavity is moderately dilated.    The aortic valve is abnormal in structure. There is severe thickening of the non-coronary cusp(s) of the aortic valve. There is a mobile undulating mass on what appears to be the ventricular surface of the aortic valve though a clear attachment is not visualized. It is highly suspicious for vegetation. Cannot determine if this is a bileaflet or trileaflet valve Trace aortic valve regurgitation is present. Mild aortic valve stenosis is present.    There is moderate, bileaflet mitral valve thickening present. Mild to moderate  mitral valve regurgitation is present. Mild mitral valve stenosis is present. The mean mitral valve gradient is 5 mm Hg at a heart rate of 97 bpm    Trace tricuspid valve regurgitation is present. Estimated right ventricular systolic pressure from tricuspid regurgitation is moderately elevated (45-55 mmHg). Calculated right ventricular systolic pressure from tricuspid regurgitation is 45 mmHg.    Discussed with patient's nurse on floor who will contact primary team for cardiology consult         CATH 9/2/22    Conclusions:  Severe multivessel coronary artery disease with 99% in-stent restenosis of diagonal stent, 60% proximal to mid LAD segment stenoses, 100% occlusion of mid right coronary artery with left-to-right collaterals filling the distal vascular bed, and luminal regularities throughout circumflex territory.  Successful revascularization of in-stent restenosis with placement of a 2.25 x 15 mm Xience drug-eluting stent deployed at 20 joan.  No residual stenoses and restoration of TREY-3 flow distally.     Recommendations:   Patient was loaded with clopidogrel in the Cath Lab will defer aspirin use given documented allergy and concomitant Eliquis use.  Patient may restart Eliquis tomorrow  Would continue medical management of LAD disease.  Lesions are somewhat borderline and the distal LAD is actually very small caliber size and distribution.           Stress test 9/2/22    Myocardial perfusion imaging indicates a large-sized infarct located in the anterior wall and apex with mild-to-moderate mirtha-infarct ischemia.  An additional medium-sized, mild-to-moderately severe area of ischemia located in the inferior wall was noted.  Left ventricular ejection fraction is severely reduced. (Calculated EF = 27%).  Impressions are consistent with a high risk study.  Spoke with Ludivina ZENG to relay information to physician.      Past Medical History:   Diagnosis Date    Atherosclerosis of abdominal aorta 02/05/2023    Atrial  fibrillation     Breast cancer     CAD (coronary artery disease)     NSTEMI 2/2022: 90% ostial LAD, 99% D1, 70% mid-distal LAD (medical therapy). She received two stents (2.5x18 and 2.5x26mm Finesse LEFTY) but I don't know which one went to which lesion.    Carotid atherosclerosis     Cholecystitis 11/22/2022    Added automatically from request for surgery [id number]    Chronic diastolic (congestive) heart failure     COVID 10/29/2022    GERD (gastroesophageal reflux disease)     Glaucoma     History of cataract     Hypertension     Microcytic anemia     per Dr. oleg pate office  note 6/30/22-dd    Myeloproliferative disorder     JAK2 positive    Nonbacterial thrombotic endocarditis     6/2021: 4x5mm vegetation on the ventricular surface of the anterior MV, negative blood cultures    Porcelain gallbladder     PUD (peptic ulcer disease)     TIA (transient ischemic attack)     Type 2 diabetes mellitus     Type 2 diabetes mellitus with circulatory disorder, without long-term current use of insulin 07/14/2022    Upper GI bleed        Past Surgical History:   Procedure Laterality Date    BREAST LUMPECTOMY  1999    BRONCHOSCOPY Bilateral 11/8/2023    Procedure: BRONCHOSCOPY UNDER FLUORO WITH BAL,  BIOPSIES; ACETYLCYSTEIN 4ML GIVEN;  Surgeon: Raoul Alford MD;  Location: [facility] ENDOSCOPY;  Service: Pulmonary;  Laterality: Bilateral;  PRE- PULMONARY INFILTRATES  POST- SAME    CARDIAC CATHETERIZATION N/A 09/02/2022    Procedure: Coronary angiography;  Surgeon: Guero Verde MD;  Location: [facility] CATH INVASIVE LOCATION;  Service: Cardiovascular;  Laterality: N/A;    CARDIAC CATHETERIZATION N/A 09/02/2022    Procedure: Stent LEFTY coronary;  Surgeon: Guero Verde MD;  Location: [facility] CATH INVASIVE LOCATION;  Service: Cardiovascular;  Laterality: N/A;    CATARACT EXTRACTION  2011    CHOLECYSTECTOMY      CHOLECYSTECTOMY WITH INTRAOPERATIVE CHOLANGIOGRAM N/A 11/28/2022    Procedure: CHOLECYSTECTOMY LAPAROSCOPIC  INTRAOPERATIVE CHOLANGIOGRAM;  Surgeon: Dani Grover Jr., MD;  Location: [facility] MAIN OR;  Service: General;  Laterality: N/A;    COLONOSCOPY N/A 02/09/2023    Procedure: COLONOSCOPY TO CECUM & T.I.;  Surgeon: Guero Ferris MD;  Location: [facility] ENDOSCOPY;  Service: Gastroenterology;  Laterality: N/A;  PRE- MELENA, GI BLEED  POST- DIVERTICULOSIS, INT HEMORRHOIDS    ENDOSCOPY N/A 01/25/2023    Procedure: ESOPHAGOGASTRODUODENOSCOPY WITH BIOPSIES;  Surgeon: Charles Diaz MD;  Location: [facility] ENDOSCOPY;  Service: Gastroenterology;  Laterality: N/A;  pre: abd pain, nausea  post: hiatal hernia, mild gastritis, sloughing of the esophagus    ENTEROSCOPY SMALL BOWEL N/A 02/09/2023    Procedure: ENTEROSCOPY SMALL BOWEL;  Surgeon: Guero Ferris MD;  Location: [facility] ENDOSCOPY;  Service: Gastroenterology;  Laterality: N/A;  PRE- MELENA, GI BLEED  POST- HIATAL HERNIA    JOINT REPLACEMENT      UPPER GASTROINTESTINAL ENDOSCOPY         Medications Prior to Admission   Medication Sig Dispense Refill Last Dose    acetaminophen (TYLENOL) 500 MG tablet Take 1 tablet by mouth As Needed.   Past Week    apixaban (ELIQUIS) 5 MG tablet tablet Take 1 tablet by mouth 2 (Two) Times a Day. 180 tablet 3 11/23/2023    aspirin 81 MG EC tablet Take 1 tablet by mouth Daily.   11/23/2023    bisacodyl (DULCOLAX) 10 MG suppository Insert 1 suppository into the rectum Daily As Needed for Constipation (Use if bisacodyl oral is ineffective).   Past Week    budesonide-formoterol (SYMBICORT) 160-4.5 MCG/ACT inhaler Inhale 2 puffs 2 (Two) Times a Day.   11/23/2023    cadexomer iodine (IODOSORB) 0.9 % gel Apply 1 application  topically to the appropriate area as directed Daily.   11/23/2023    famotidine (PEPCID) 20 MG tablet Take 1 tablet by mouth Daily.   11/23/2023    furosemide (LASIX) 20 MG tablet Take 0.5 tablets by mouth Daily.   11/23/2023    latanoprost (XALATAN) 0.005 % ophthalmic solution Administer 1 drop to both eyes.   11/23/2023     metoprolol succinate XL (TOPROL-XL) 25 MG 24 hr tablet Take 1 tablet by mouth Daily. 90 tablet 2 11/23/2023    Momelotinib Dihydrochloride (OJJAARA) 200 MG tablet Take 1 tablet by mouth Daily. 28 tablet 3 11/23/2023    polyethylene glycol (MIRALAX) 17 g packet Take 17 g by mouth Daily As Needed (Use if senna-docusate is ineffective).   Past Week    predniSONE (DELTASONE) 5 MG tablet Take 4 tablets by mouth Daily With Breakfast for 6 days, THEN 3 tablets Daily With Breakfast for 7 days, THEN 2 tablets Daily With Breakfast for 7 days, THEN 1 tablet Daily With Breakfast for 7 days. 66 tablet 0 11/23/2023    saccharomyces boulardii (FLORASTOR) 250 MG capsule Take 1 capsule by mouth 2 (Two) Times a Day. 60 capsule 0 11/23/2023    sertraline (ZOLOFT) 100 MG tablet Take 1 tablet by mouth Daily.   11/23/2023    simethicone (MYLICON) 80 MG chewable tablet Chew 1 tablet by mouth 4 (Four) Times a Day As Needed for Flatulence. 100 tablet 0 Past Week    tiotropium bromide monohydrate (SPIRIVA RESPIMAT) 2.5 MCG/ACT aerosol solution inhaler Inhale 2 puffs Daily.   11/23/2023       Current Meds  Scheduled Meds:apixaban, 5 mg, Oral, BID  aspirin, 81 mg, Oral, Daily  Eucerin original healing lotion, , Topical, Q12H  lactated ringers, 1,000 mL, Intravenous, Once  Menthol-Zinc Oxide, 1 application , Topical, Q12H  metoprolol succinate XL, 25 mg, Oral, Daily  pantoprazole, 40 mg, Intravenous, Q AM  predniSONE, 20 mg, Oral, Daily With Breakfast   Followed by  [START ON 11/29/2023] predniSONE, 15 mg, Oral, Daily With Breakfast   Followed by  [START ON 12/6/2023] predniSONE, 10 mg, Oral, Daily With Breakfast   Followed by  [START ON 12/13/2023] predniSONE, 5 mg, Oral, Daily With Breakfast  senna-docusate sodium, 2 tablet, Oral, BID  sertraline, 100 mg, Oral, Daily  tiotropium bromide monohydrate, 2 puff, Inhalation, Daily - RT  vancomycin, 1,000 mg, Intravenous, Q24H      Continuous Infusions:Pharmacy to dose vancomycin,       PRN Meds:.   "acetaminophen    senna-docusate sodium **AND** polyethylene glycol **AND** bisacodyl **AND** bisacodyl    Calcium Replacement - Follow Nurse / BPA Driven Protocol    hydrOXYzine    Magnesium Cardiology Dose Replacement - Follow Nurse / BPA Driven Protocol    melatonin    nitroglycerin    ondansetron **OR** ondansetron    Pharmacy to dose vancomycin    Phosphorus Replacement - Follow Nurse / BPA Driven Protocol    Potassium Replacement - Follow Nurse / BPA Driven Protocol    sodium chloride    Allergies as of 2023 - Reviewed 2023   Allergen Reaction Noted    Aspirin Unknown - Low Severity 2022    Diphenhydramine hcl Anxiety 2021    Hydroxyurea Other (See Comments) 2023    Oxycodone Unknown - Low Severity 2014       Social History     Socioeconomic History    Marital status:    Tobacco Use    Smoking status: Former     Packs/day: 1.00     Years: 20.00     Additional pack years: 0.00     Total pack years: 20.00     Types: Cigarettes     Quit date: 1950     Years since quittin.0     Passive exposure: Past    Smokeless tobacco: Never    Tobacco comments:     30  years ago   Vaping Use    Vaping Use: Never used   Substance and Sexual Activity    Alcohol use: Not Currently     Comment: \"rare\"    Drug use: Never    Sexual activity: Defer       Family History   Problem Relation Age of Onset    Ovarian cancer Mother     Lung cancer Father     Lung cancer Sister     Malig Hyperthermia Neg Hx        REVIEW OF SYSTEMS:   12 point ROS was performed and is negative except as outlined in HPI       Objective:   Temp:  [97.7 °F (36.5 °C)-98.4 °F (36.9 °C)] 98.4 °F (36.9 °C)  Heart Rate:  [] 110  Resp:  [18] 18  BP: ()/(47-81) 110/70  Body mass index is 26.52 kg/m².  Flowsheet Rows      Flowsheet Row First Filed Value   Admission Height 157.5 cm (62\") Documented at 2023   Admission Weight 63.5 kg (140 lb) Documented at 2023          Vitals:    " 11/27/23 0727   BP: 110/70   Pulse:    Resp:    Temp:    SpO2:        General Appearance:    Alert, cooperative, in no acute distress   Head:    Normocephalic, without obvious abnormality, atraumatic   Throat:   oral mucosa moist   Lungs:     Clear to auscultation,respirations regular, even and unlabored    Heart:    Regular rhythm and normal rate, normal S1 and S2, 2/6 GERARDO no gallop, no rub, no click   Extremities:   Moves all extremities well, no edema, no cyanosis, no redness   Pulses:   Pulses palpable and equal bilaterally. Normal radial, carotid,  dorsalis pedis and posterior tibial pulses bilaterally.      Lab Review:      Results from last 7 days   Lab Units 11/26/23 2325 11/26/23 0338 11/25/23  0246   SODIUM mmol/L  --  139 140   POTASSIUM mmol/L 4.2 2.7* 3.3*   CHLORIDE mmol/L  --  97* 95*   CO2 mmol/L  --  27.1 29.0   BUN mg/dL  --  15 22   CREATININE mg/dL  --  0.76 0.86   CALCIUM mg/dL  --  8.3* 8.0*   BILIRUBIN mg/dL  --   --  0.8   ALK PHOS U/L  --   --  190*   ALT (SGPT) U/L  --   --  18   AST (SGOT) U/L  --   --  15   GLUCOSE mg/dL  --  65 116*     Results from last 7 days   Lab Units 11/26/23  1422 11/26/23  1222 11/24/23  0002   HSTROP T ng/L 22* 30* 33*     @LABRCNT(bnp)@  Results from last 7 days   Lab Units 11/26/23 0338 11/25/23  0246 11/24/23  0736   WBC 10*3/mm3 87.18* 84.33* 83.18*   HEMOGLOBIN g/dL 8.1* 8.5* 10.1*   HEMATOCRIT % 24.9* 26.3* 31.4*   PLATELETS 10*3/mm3 291 293 298         Results from last 7 days   Lab Units 11/25/23  0246   MAGNESIUM mg/dL 1.9                               I personally viewed and interpreted the patient's EKG/Telemetry data        CAP (community acquired pneumonia)    Myeloproliferative disorder      Assessment and Plan:    Community acquired pneumonia on antibiotics  MRSA bacteremia  Metabolic acidosis  Myeloproliferative disorder with anemia  History of marantic vegetations  - c/w echo 9/2022, there is no change.  Diabetes mellitus type 2  Atrial  fibrillation, on Eliquis.  Heart rate is controlled on metoprolol.    Overall her cardiac status is stable.  Will try to talk to primary service about the abnormal echocardiogram which I think looks the same as 2022.  No other testing needed at this time.    Marimar Fink MD  11/27/23  09:33 EST.  Time spent in reviewing chart, discussion and examination:

## 2023-11-27 NOTE — PROGRESS NOTES
"[facility]: Vancomycin Monitoring Note    Catherine Boyer is a 83 y.o. female who is on day 3/5 of pharmacy to dose vancomycin for Bacteremia.      Previous Vancomycin Dose:  1000mg IV every 24 hours  Updated Cultures and Sensitivities:   11/24: Blood Cx MRSA      Vitals/Labs  Ht: 157.5 cm (62\"); Wt: 65.8 kg (145 lb)   Temp Readings from Last 1 Encounters:   11/26/23 98.4 °F (36.9 °C) (Oral)     Estimated Creatinine Clearance: 48.2 mL/min (by C-G formula based on SCr of 0.76 mg/dL).     Results from last 7 days   Lab Units 11/26/23  0338 11/25/23  0246 11/24/23  0736   CREATININE mg/dL 0.76 0.86 0.91   WBC 10*3/mm3 87.18* 84.33* 83.18*     Assessment/Plan    Current Vancomycin Dose: 1000mg IV every 24 hours; provides a predicted  mg/L.hr   Next Level Date and Time: Vanc Trough on 11/28 at 0930  Duration of consult ends 11/30, will need to be extended based on indication and not yet documented sterility of MRSA bacteremia. Will defer to ID team on how long to extend consult at this time when they review the pt today.   We will continue to monitor patient changes and renal function     Thank you for involving pharmacy in this patient's care. Please contact pharmacy with any questions or concerns.       Carlita Aleman, PharmD  Clinical Pharmacist    "

## 2023-11-27 NOTE — NURSING NOTE
Access follow up regarding anxiety. Chart reviewed. Pt declines resources but med changes were made per the hospitalist. Access following to see if improvement in sleep, and if not a psychiatrist consult can be placed. Following.

## 2023-11-27 NOTE — PROGRESS NOTES
LOS: 3 days     Chief Complaint:  MRSA bacteremia     Interval History: Afebrile, no new complaints.  Less anxious today.  Tolerating antibiotics without abdominal pain vomiting diarrhea or rash    Vital Signs  Temp:  [97.7 °F (36.5 °C)-98.4 °F (36.9 °C)] 98.4 °F (36.9 °C)  Heart Rate:  [] 110  Resp:  [18] 18  BP: ()/(47-81) 110/70    Physical Exam:  General: Chronically ill-appearing  Cardiovascular: Tachycardic, no lower extremity edema  Respiratory: breathing comfortably on room air  GI: Soft, NT/ND  Skin: No rashes    Antibiotics:  Vancomycin dosing per pharmacy      Results Review:    Lab Results   Component Value Date    WBC 87.18 (C) 11/26/2023    HGB 8.1 (L) 11/26/2023    HCT 24.9 (L) 11/26/2023    MCV 93.3 11/26/2023     11/26/2023     Lab Results   Component Value Date    GLUCOSE 65 11/26/2023    BUN 15 11/26/2023    CREATININE 0.76 11/26/2023    EGFRIFNONA 71 07/09/2021    EGFRIFAFRI 81 07/09/2021    BCR 19.7 11/26/2023    CO2 27.1 11/26/2023    CALCIUM 8.3 (L) 11/26/2023    PROTENTOTREF 7.0 03/21/2023    ALBUMIN 3.5 11/25/2023    LABIL2 1.4 03/21/2023    AST 15 11/25/2023    ALT 18 11/25/2023     Procalcitonin 0.25 -> 0.14     Microbiology:  11/26 BCx P x 2  11/24 BCx MRSA 1/2   11/24 RVP neg     11/8 BAL cx neg    Echocardiogram with a severe thickening of the noncoronary cusp of the aortic valve concerning for possible vegetation.    Assessment & Plan   MRSA bacteremia  Myelofibrosis on Jakafi complicating above  Chronic leukocytosis due to above  Bilateral pulmonary infiltrates    Echocardiogram with concern for vegetation on the aortic valve.  Cardiology has been consulted.  Repeat blood cultures are pending x 2.  Continue vancomycin dosing per pharmacy.

## 2023-11-27 NOTE — PAYOR COMM NOTE
"Maribell Boyer (83 y.o. Female)        PLEASE SEE ADDITIONAL CLNS FOR REVIEW.     REF#[id number]    PLEASE CALL  OR  [phone]    THANK YOU    BARBI CASTRO LPN CCP   Date of Birth   1940    Social Security Number       Address   [address] [address]    Home Phone   [phone]    MRN   [id number]       Scientologist   Unknown    Marital Status                               Admission Date   11/23/23    Admission Type   Emergency    Admitting Provider   Junior Doan MD    Attending Provider   Ugo Case MD    Department, Room/Bed   [address], [address]1       Discharge Date       Discharge Disposition       Discharge Destination                                 Attending Provider: Ugo Case MD    Allergies: Aspirin, Diphenhydramine Hcl, Hydroxyurea, Oxycodone    Isolation: Contact   Infection: ESBL E coli (11/05/23), MRSA (11/25/23)   Code Status: CPR    Ht: 157.5 cm (62\")   Wt: 65.8 kg (145 lb)    Admission Cmt: None   Principal Problem: CAP (community acquired pneumonia) [J18.9]                   Active Insurance as of 11/23/2023       Primary Coverage       Payor Plan Insurance Group Employer/Plan Group    AETNA MEDICARE REPLACEMENT AETNA MEDICARE REPLACEMENT [id number]       Payor Plan Address Payor Plan Phone Number Payor Plan Fax Number Effective Dates    [address] [phone]  1/1/2022 - None Entered    [facility] [id number]         Subscriber Name Subscriber Birth Date Member ID       MARIBELL BOYER 1940 [id number]                     Emergency Contacts        (Rel.) Home Phone Work Phone Mobile Phone    Karen Gutierrez (1stPOA) (Daughter) -- -- [phone]    Karan Boyer (2ndPOA) (Son) -- -- [phone]    RAY BOYER (Daughter) [phone] -- [phone]    MerlinMagaliGreg (Son) -- -- [phone]    MEG BANKS (Brother) -- -- [phone] "              Maddy: VANDANA [id number]  Tax ID [id number]  Oxygen Therapy (last 3 days)       Date/Time SpO2 Device (Oxygen Therapy) Flow (L/min) Oxygen Concentration (%) ETCO2 (mmHg)    11/27/23 0541 96 -- -- -- --    11/27/23 0015 -- nasal cannula 2 -- --    11/26/23 2330 -- nasal cannula 2 -- --    11/26/23 1946 -- nasal cannula 2 -- --    11/26/23 1915 96 nasal cannula 2 -- --    11/26/23 1342 100 nasal cannula -- -- --    11/26/23 0935 91 nasal cannula 2 -- --    11/26/23 0852 -- nasal cannula 2 -- --    11/26/23 0721 92 nasal cannula -- -- --    11/25/23 2319 96 nasal cannula 2 -- --    11/25/23 2004 -- nasal cannula 2 -- --    11/25/23 1924 93 nasal cannula 2 -- --    11/25/23 1322 98 -- -- -- --    11/25/23 1029 89 nasal cannula 2 -- --    11/25/23 0852 -- nasal cannula 2 -- --    11/25/23 0713 94 -- -- -- --    11/24/23 2325 97 nasal cannula 2 -- --    11/24/23 2018 -- -- 2 -- --    11/24/23 1926 95 nasal cannula 2 -- --    11/24/23 1409 -- nasal cannula 2 -- --    11/24/23 1233 96 nasal cannula 2 -- --    11/24/23 1207 72 -- -- -- --    11/24/23 1131 94 -- 2 -- --    11/24/23 0917 -- nasal cannula 2 -- --    11/24/23 0655 92 nasal cannula 2 -- --    11/24/23 0514 -- nasal cannula 2 -- --    11/24/23 0445 92 nasal cannula 2 -- --    11/24/23 0335 98 -- -- -- --    11/24/23 0334 99 -- -- -- --    11/24/23 0333 99 -- -- -- --    11/24/23 0332 99 -- -- -- --    11/24/23 0316 94 -- -- -- --    11/24/23 0315 93 -- -- -- --    11/24/23 0314 93 -- -- -- --    11/24/23 0311 93 -- -- -- --    11/24/23 0221 93 -- -- -- --    11/24/23 0203 94 -- -- -- --    11/24/23 0104 97 -- -- -- --    11/24/23 0101 94 -- -- -- --    11/24/23 0021 100 -- -- -- --    11/24/23 0020 100 -- -- -- --    11/24/23 0019 100 -- -- -- --    11/24/23 0018 100 -- -- -- --    11/24/23 0002 95 -- -- -- --    11/24/23 0001 93 -- -- -- --    11/24/23 0000 94 -- -- -- --          Intake & Output (last 3 days)         11/24 0701  11/25 0700 11/25  0701   0700  0701   0700  0701   0700    P.O. 1270       IV Piggyback  1000      Total Intake(mL/kg) 1270 (20.8) 1000 (16.5)      Urine (mL/kg/hr) 600 (0.4) 750 (0.5) 700 (0.4)     Stool  0 0     Total Output 600 750 700     Net +670 +250 -700             Urine Unmeasured Occurrence 1 x 1 x 1 x     Stool Unmeasured Occurrence  1 x 3 x            Lines, Drains & Airways       Active LDAs       Name Placement date Placement time Site Days    Peripheral IV 23 1558 Anterior;Right;Upper Arm 23  1558  Arm  1    External Urinary Catheter --  --  --  --                  Operative/Procedure Notes (last 72 hours)  Notes from 23 0858 through 23 08   No notes of this type exist for this encounter.          Physician Progress Notes (last 72 hours)        Junior Doan MD at 23 1351              Name: Catherine Boyer ADMIT: 2023   : 1940  PCP: Brett Jackson MD    MRN: [id number] LOS: 2 days   AGE/SEX: 83 y.o. female  ROOM: [facility]/     Subjective     She was complaining of chest pain this morning.  EKG was obtained which did not show any remarkable changes from previous EKGs.  Troponin was slightly elevated at 30.    Objective   Objective   Vital Signs  Temp:  [97.5 °F (36.4 °C)-98.6 °F (37 °C)] 97.5 °F (36.4 °C)  Heart Rate:  [] 112  Resp:  [18] 18  BP: ()/(48-70) 87/70  SpO2:  [91 %-96 %] 91 %  on  Flow (L/min):  [2] 2;   Device (Oxygen Therapy): nasal cannula  Body mass index is 24.42 kg/m².  Physical Exam  Constitutional:       General: She is not in acute distress.     Appearance: She is ill-appearing.   HENT:      Head: Normocephalic and atraumatic.   Cardiovascular:      Rate and Rhythm: Normal rate and regular rhythm.   Pulmonary:      Effort: Pulmonary effort is normal. No respiratory distress.   Abdominal:      General: There is no distension.      Palpations: Abdomen is soft.      Tenderness: There is no abdominal tenderness.  "  Neurological:      Mental Status: She is alert and oriented to person, place, and time.      Motor: Weakness present.         Results Review     I reviewed the patient's new clinical results.  Results from last 7 days   Lab Units 11/26/23 0338 11/25/23 0246 11/24/23 0736 11/24/23  0002   WBC 10*3/mm3 87.18* 84.33* 83.18* 63.14*   HEMOGLOBIN g/dL 8.1* 8.5* 10.1* 9.4*   PLATELETS 10*3/mm3 291 293 298 232     Results from last 7 days   Lab Units 11/26/23 0338 11/25/23 0246 11/24/23 0736 11/24/23  0002   SODIUM mmol/L 139 140 139 135*   POTASSIUM mmol/L 2.7* 3.3* 4.1 4.4   CHLORIDE mmol/L 97* 95* 89* 93*   CO2 mmol/L 27.1 29.0 27.2 26.2   BUN mg/dL 15 22 23 28*   CREATININE mg/dL 0.76 0.86 0.91 0.87   GLUCOSE mg/dL 65 116* 133* 210*   Estimated Creatinine Clearance: 46.4 mL/min (by C-G formula based on SCr of 0.76 mg/dL).  Results from last 7 days   Lab Units 11/25/23 0246 11/24/23 0736 11/24/23  0002 11/22/23  0601   ALBUMIN g/dL 3.5 4.0 3.7 4.1   BILIRUBIN mg/dL 0.8 1.1 1.0  --    ALK PHOS U/L 190* 265* 209*  --    AST (SGOT) U/L 15 20 28  --    ALT (SGPT) U/L 18 24 24  --      Results from last 7 days   Lab Units 11/26/23 0338 11/25/23 0246 11/24/23 0736 11/24/23  0002 11/22/23  0601 11/21/23  0603 11/20/23  0603   CALCIUM mg/dL 8.3* 8.0* 9.2 8.7 9.3 9.4 9.4   ALBUMIN g/dL  --  3.5 4.0 3.7 4.1 3.9 3.9   MAGNESIUM mg/dL  --  1.9  --  1.7  --   --   --    PHOSPHORUS mg/dL  --  4.3  --   --  2.8 2.9 2.6     Results from last 7 days   Lab Units 11/25/23  0246 11/24/23  1922 11/24/23  1315 11/24/23  1042 11/24/23  0241 11/24/23  0002   PROCALCITONIN ng/mL 0.14  --   --   --   --  0.25   LACTATE mmol/L 4.8* 6.0* 4.2* 5.2*   < >  --     < > = values in this interval not displayed.     COVID19   Date Value Ref Range Status   11/24/2023 Not Detected Not Detected - Ref. Range Final   11/08/2023 Not Detected Not Detected - Ref. Range Final     No results found for: \"HGBA1C\", \"POCGLU\"  Results for orders placed or " performed during the hospital encounter of 11/23/23   Blood Culture - Blood, Arm, Left    Specimen: Arm, Left; Blood   Result Value Ref Range    Blood Culture Staphylococcus aureus, MRSA (C)     Isolated from Aerobic Bottle     Gram Stain Aerobic Bottle Gram positive cocci in clusters (C)          CT Angiogram Abdomen Pelvis  Narrative: Patient: MARIBELL RUGGIERO  Time Out: 01:24  Exam(s): CTA ABDOMEN + PELVIS     EXAM:    CT Angiography Abdomen and Pelvis With Intravenous Contrast    CLINICAL HISTORY:       Concern for acute aortic syndrome.    TECHNIQUE:    Axial computed tomographic angiography images of the abdomen and pelvis   with intravenous contrast.  CTDI is 37 mGy and DLP is 934 mGy-cm.  This   CT exam was performed according to the principle of ALARA (As Low As   Reasonably Achievable) by using one or more of the following dose   reduction techniques: automated exposure control, adjustment of the mA   and or kV according to patient size, and or use of iterative   reconstruction technique.    MIP reconstructed images were created and reviewed.    COMPARISON:    No relevant prior studies available.    FINDINGS:     VASCULATURE:    Aorta:  No acute findings.  No aortic aneurysm or dissection.    Celiac trunk and mesenteric arteries:  Mild atherosclerosis of the   origin of the celiac trunk, SMA and BETZY without significant stenosis.    Renal arteries:  No acute findings.  No occlusion or significant   stenosis.    Iliac arteries:  Moderate atherosclerosis of the bilateral common iliac   arteries, external iliac arteries and internal iliac arteries.  No   occlusion or significant stenosis.      Lung bases:  Unremarkable.  No mass.  No consolidation.     ABDOMEN:    Liver:  Nodular contour of the liver is concerning for cirrhosis.    Gallbladder and bile ducts:  Cholecystectomy.  No ductal dilation.    Pancreas:  Unremarkable.  No ductal dilation.  No mass.    Spleen:  Splenomegaly.  The spleen is heterogeneous  which could   represent a perfusion anomaly.    Adrenals:  Unremarkable.  No mass.    Kidneys and ureters:  Unremarkable.  No hydronephrosis.  No solid mass.    Stomach and bowel:  Diverticulosis.  No obstruction.  No mucosal   thickening.     PELVIS:    Appendix:  No findings to suggest acute appendicitis.    Bladder:  Unremarkable.  No mass.    Reproductive:  Unremarkable as visualized.     ABDOMEN and PELVIS:    Intraperitoneal space:  Unremarkable.  No significant fluid collection.    No free air.    Bones joints:  There are degenerative changes of the spine.  No acute   fracture.  No dislocation.    Soft tissues:  Unremarkable.    Lymph nodes:  Unremarkable.  No enlarged lymph nodes.    IMPRESSION:       1.  No aortic aneurysm or dissection.  2.  Nodular contour of the liver is concerning for cirrhosis.  3.  The spleen is heterogeneous which could represent a perfusion anomaly.    4.  Splenomegaly is nonspecific but concerning for portal hypertension.  5.  Diverticulosis.  Impression: Electronically signed by Yuki Starr MD on 11-24-23 at 0124  CT Angiogram Chest  Narrative: Patient: MARIBELL RUGGIERO  Time Out: 01:22  Exam(s): CTA CHEST     EXAM:    CT Angiography Chest With Intravenous Contrast    CLINICAL HISTORY:       Concern for acute aortic syndrome.    TECHNIQUE:    Axial computed tomographic angiography images of the chest with   intravenous contrast.  CTDI is 37 mGy and DLP is 934 mGy-cm.  This CT   exam was performed according to the principle of ALARA (As Low As   Reasonably Achievable) by using one or more of the following dose   reduction techniques: automated exposure control, adjustment of the mA   and or kV according to patient size, and or use of iterative   reconstruction technique.    MIP reconstructed images were created and reviewed.    COMPARISON:    No relevant prior studies available.    FINDINGS:    Pulmonary arteries:  Unremarkable.  No pulmonary embolus.    Aorta:  Mild  atherosclerosis.  No aortic aneurysm or dissection.    Lungs:  Diffuse bilateral airspace opacities are concerning for   multifocal pneumonia and or aspiration.  Cannot exclude superimposed   pulmonary edema.    Pleural space:  Unremarkable.  No significant effusion.  No   pneumothorax.    Heart:  Unremarkable.  No cardiomegaly.  No significant pericardial   effusion.  No evidence of RV dysfunction.    Bones joints:  There are degenerative changes of the spine.  No acute   fracture.    Soft tissues:  Unremarkable.    Lymph nodes:  Unremarkable.  No enlarged lymph nodes.    IMPRESSION:       1.  No aortic aneurysm or dissection.  2.  No pulmonary embolus.  3.  Diffuse bilateral airspace opacities are concerning for multifocal   pneumonia and or aspiration.  Cannot exclude superimposed pulmonary edema.  Impression: Electronically signed by Yuki Starr MD on 11-24-23 at 0122  XR Chest 1 View  Narrative: Patient: MARIBELL RUGGIERO  Time Out: 01:00  Exam(s): XR CXR 1 VIEW     EXAM:    XR Chest, 1 View    CLINICAL HISTORY:       Nausea and upper abd pain.    TECHNIQUE:    Frontal view of the chest.    COMPARISON:    Chest radiograph 11 20 2023    FINDINGS:    Lungs:  Significantly worsened bilateral airspace opacities.    Pleural space:  New small left pleural effusion.  No pneumothorax.    Heart:  Unremarkable.  No cardiomegaly.    Mediastinum:  Unremarkable.  Normal mediastinal contour.    Bones joints:  There are degenerative changes of the spine.  No acute   fracture.    IMPRESSION:       1.  Significantly worsened bilateral airspace opacities.  2.  New small left pleural effusion.  Impression: Electronically signed by Yuki Starr MD on 11-24-23 at 0100    Scheduled Medications  apixaban, 5 mg, Oral, BID  aspirin, 81 mg, Oral, Daily  Eucerin original healing lotion, , Topical, Q12H  lactated ringers, 1,000 mL, Intravenous, Once  Menthol-Zinc Oxide, 1 application , Topical, Q12H  metoprolol succinate XL, 25 mg,  Oral, Daily  pantoprazole, 40 mg, Intravenous, Q AM  potassium chloride ER, 40 mEq, Oral, Q4H  predniSONE, 20 mg, Oral, Daily With Breakfast   Followed by  [START ON 11/29/2023] predniSONE, 15 mg, Oral, Daily With Breakfast   Followed by  [START ON 12/6/2023] predniSONE, 10 mg, Oral, Daily With Breakfast   Followed by  [START ON 12/13/2023] predniSONE, 5 mg, Oral, Daily With Breakfast  senna-docusate sodium, 2 tablet, Oral, BID  sertraline, 100 mg, Oral, Daily  tiotropium bromide monohydrate, 2 puff, Inhalation, Daily - RT  vancomycin, 1,000 mg, Intravenous, Q24H    Infusions  Pharmacy to dose vancomycin,     Diet  Diet: Cardiac Diets, Diabetic Diets; Healthy Heart (2-3 Na+); Consistent Carbohydrate; Texture: Regular Texture (IDDSI 7); Fluid Consistency: Thin (IDDSI 0)      Assessment/Plan     Active Hospital Problems    Diagnosis  POA    **CAP (community acquired pneumonia) [J18.9]  Yes    Myeloproliferative disorder [D47.1]  Yes      Resolved Hospital Problems   No resolved problems to display.       83 y.o. female admitted with CAP (community acquired pneumonia).    Chest pain  Troponin elevated to 30.  We will need to repeat.  EKG without any significant changes from previous EKG.  Trend troponin.  If it remains elevated we will need to consult cardiology      Sepsis secondary to MRSA bacteremia   Lactic Acidosis   -Her white blood cell count is elevated, though this is partially due to the myelodysplastic syndrome.  Lactic acid was 5.2 initially.  -Administer 1000 cc of normal saline at 250 cc an hour and monitor closely for any respiratory compromise.  -She has been started on Unasyn which is an appropriate antibiotic for aspiration pneumonia, however going through her previous CT scans, it appears that the bilateral pulmonary infiltrates are not new findings and have been present persistently in the past.  -Blood culture x1 with MRSA. Start vancomyin and consult ID  -From going through her previous  hospitalization, the bilateral pulmonary infiltrates are not new findings.  -ID consulted.  She will need echocardiogram     Myelodysplastic syndrome  -Patient takes ojjaara. Oncology following, hold for now     Atrial fibrillation  -Continue Eliquis, metoprolol      Lung mass  Status post bronchoscopy on 11/8/2023 with biopsies.  Pathology showed no evidence of malignancy.  She was previously on IV steroids for prolonged course during her previous hospitalization and has then been transitioned to oral steroids and was discharged today.  Starting to taper.  I will continue this taper for now.       Eliquis for DVT prophylaxis.  Full code.  Discussed with patient and nursing staff.  Anticipate discharge to SNU facility timing yet to be determined.      Junior Doan MD  [facility]  11/26/23  13:51 EST          Electronically signed by Junior Doan MD at 11/26/23 1408       Danisha Reinoso MD at 11/26/23 1048           LOS: 2 days     Chief Complaint:  MRSA bacteremia     Interval History: Afebrile, does not feel is a good today with an episode of vomiting and nausea.  No diarrhea or rash.    Vital Signs  Temp:  [97.5 °F (36.4 °C)-98.6 °F (37 °C)] 97.5 °F (36.4 °C)  Heart Rate:  [] 112  Resp:  [18] 18  BP: ()/(48-96) 116/56    Physical Exam:  General: Chronically ill-appearing  Cardiovascular: Tachycardic, no lower extremity edema  Respiratory: breathing comfortably on room air  GI: Soft, NT/ND  Skin: No rashes    Antibiotics:  Vancomycin dosing per pharmacy      Results Review:    Lab Results   Component Value Date    WBC 87.18 (C) 11/26/2023    HGB 8.1 (L) 11/26/2023    HCT 24.9 (L) 11/26/2023    MCV 93.3 11/26/2023     11/26/2023     Lab Results   Component Value Date    GLUCOSE 65 11/26/2023    BUN 15 11/26/2023    CREATININE 0.76 11/26/2023    EGFRIFNONA 71 07/09/2021    EGFRIFAFRI 81 07/09/2021    BCR 19.7 11/26/2023    CO2 27.1 11/26/2023    CALCIUM 8.3 (L) 11/26/2023     PROTENTOTREF 7.0 03/21/2023    ALBUMIN 3.5 11/25/2023    LABIL2 1.4 03/21/2023    AST 15 11/25/2023    ALT 18 11/25/2023     Procalcitonin 0.25 -> 0.14     Microbiology:  11/24 BCx MRSA 1/2   11/24 RVP neg     11/8 BAL cx neg    Assessment & Plan   MRSA bacteremia  Myelofibrosis on Jakafi complicating above  Chronic leukocytosis due to above  Bilateral pulmonary infiltrates    Will obtain repeat blood cultures x 2 today.  Will order an echocardiogram to rule out endocarditis.  Continue vancomycin dosing per pharmacy.  Consider pulmonary consultation given persistence of bilateral pulmonary infiltrates.        Electronically signed by Danisha Reinoso MD at 11/26/23 1332       Ramirez Oliva MD at 11/26/23 0831            Subjective     HISTORY OF PRESENT ILLNESS:   No acute issues overnight.  Patient reports of having nausea and heartburn this morning.  Afebrile.  Has low blood pressure this morning.    Past Medical History, Past Surgical History, Social History, Family History have been reviewed and are without significant changes except as mentioned.    Review of Systems   A comprehensive 14 point review of systems was performed and was negative except as mentioned.    Medications:  The current medication list was reviewed in the EMR    ALLERGIES:    Allergies   Allergen Reactions    Aspirin Unknown - Low Severity    Diphenhydramine Hcl Anxiety     Benadryl IVP    Hydroxyurea Other (See Comments)     Her hemoglobin drop and was stop in February 2022 and start taking Jakafi    Oxycodone Unknown - Low Severity       Objective      Vitals:    11/25/23 1322 11/25/23 1924 11/25/23 2319 11/26/23 0539   BP: 116/96 120/50 94/48    BP Location: Left arm Left arm Left arm    Patient Position: Lying Lying Lying    Pulse: 81 104 93    Resp: 18 18 18    Temp: 97.8 °F (36.6 °C) 98.1 °F (36.7 °C) 98.6 °F (37 °C)    TempSrc: Oral Oral Oral    SpO2: 98% 93% 96%    Weight:    60.6 kg (133 lb 8 oz)   Height:             10/18/2023      4:02 PM   Current Status   ECOG score 2       Physical Exam    CONSTITUTIONAL:  Vital signs reviewed.  No distress, looks comfortable.  EYES:  Conjunctiva and lids unremarkable.    EARS,NOSE,MOUTH,THROAT:  Ears and nose appear unremarkable.    RESPIRATORY:  Normal respiratory effort.  CARDIOVASCULAR:  Normal heart rate  GASTROINTESTINAL: Abdomen appears unremarkable.  Nondistended   LYMPHATIC:  No cervical, supraclavicular lymphadenopathy.  SKIN:  Warm.  No rashes.  PSYCHIATRIC:  Normal judgment and insight.  Normal mood and affect.  NEURO: AAOx3, no obvious focal deficits.      RECENT LABS:  Hematology WBC   Date Value Ref Range Status   11/26/2023 87.18 (C) 3.40 - 10.80 10*3/mm3 Final     RBC   Date Value Ref Range Status   11/26/2023 2.67 (L) 3.77 - 5.28 10*6/mm3 Final     Hemoglobin   Date Value Ref Range Status   11/26/2023 8.1 (L) 12.0 - 15.9 g/dL Final     Hematocrit   Date Value Ref Range Status   11/26/2023 24.9 (L) 34.0 - 46.6 % Final     Platelets   Date Value Ref Range Status   11/26/2023 291 140 - 450 10*3/mm3 Final              Assessment & Plan   Patient is a pleasant 83-year-old female with medical history significant for JAK2 positive myeloproliferative disorder, perhaps primary myelofibrosis, A-fib on chronic Eliquis, CAD with stents, history of endocarditis and TIA admitted with nausea and vomiting.       #Nausea and vomiting due to suspected sepsis and pneumonia related:  Patient had presented to [facility] ER on 11/23/2023 with nausea and vomiting.  Patient was discharged just a day before after prolonged hospitalization for ESBL UTI, acute renal failure and respiratory distress.  Patient states that she initially reached the rehab facility, she started to feel bad.  She developed nausea and vomiting and this was brought back to the hospital.  Work-up in the ER with CT scans did not show any acute abnormalities.  Persistent bilateral pulmonary opacities, concerning for  multifocal pneumonia and/or aspiration.  Elevated lactic acid level of 5.2  : Continues to have N/V.  Started on IV Protonix daily.  Encouraged to ask for Zofran.  Lactate improved to 4.8.  Continue IV fluids and antibiotics as per primary. 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  This is likely primary myelofibrosis +/-steroids and infection related  Will hold Ojjara while inpatient as it can be associated with nausea and vomiting     #Leukocytosis: As above    #MRSA bacteremia:  ID is on board.  On broad-spectrum antibiotics.  Plan for echocardiogram to evaluate for endocarditis noted.      #Liver cirrhosis and splenomegaly     #Chronic hypoxic respiratory failure:  She was evaluated with bronchoscopy with biopsies on 2023.  No evidence of malignancy.  Continue with gradual steroids taper  Antibiotics as above     #History of A-fib on Eliquis     Recommendations:  -Hold Ojjara while inpatient.  -Started on IV Protonix  -Continue antibiotics and IV fluids as per primary  -Continue gradual taper of steroids  -Will follow in periphery.     She has an appointment scheduled with our office on 2023.  Please call us with any questions    Electronically signed by Ramirez Oliva MD at 23 1406       Junior Doan MD at 23 1013              Name: Catherine Boyer ADMIT: 2023   : 1940  PCP: Brett Jackson MD    MRN: [id number] LOS: 1 days   AGE/SEX: 83 y.o. female  ROOM: [facility]     Subjective   Blood cultures with MRSA.   States she does not feel well. Lactic acid still elevated.     Objective   Objective   Vital Signs  Temp:  [97.5 °F (36.4 °C)-99 °F (37.2 °C)] 97.5 °F (36.4 °C)  Heart Rate:  [] 105  Resp:  [18-20] 20  BP: (115-130)/(55-67) 122/60  SpO2:  [72 %-97 %] 94 %  on  Flow (L/min):  [2] 2;   Device (Oxygen Therapy): nasal cannula  Body mass index is  24.6 kg/m².  Physical Exam  Constitutional:       General: She is not in acute distress.     Appearance: She is ill-appearing.   HENT:      Head: Normocephalic and atraumatic.   Cardiovascular:      Rate and Rhythm: Normal rate and regular rhythm.   Pulmonary:      Effort: Pulmonary effort is normal. No respiratory distress.   Abdominal:      General: There is no distension.      Palpations: Abdomen is soft.      Tenderness: There is no abdominal tenderness.   Neurological:      Mental Status: She is alert and oriented to person, place, and time.      Motor: Weakness present.         Results Review     I reviewed the patient's new clinical results.  Results from last 7 days   Lab Units 11/25/23 0246 11/24/23 0736 11/24/23 0002 11/22/23  0456   WBC 10*3/mm3 84.33* 83.18* 63.14* 63.75*   HEMOGLOBIN g/dL 8.5* 10.1* 9.4* 9.9*   PLATELETS 10*3/mm3 293 298 232 226     Results from last 7 days   Lab Units 11/25/23 0246 11/24/23 0736 11/24/23 0002 11/22/23  0601   SODIUM mmol/L 140 139 135* 139   POTASSIUM mmol/L 3.3* 4.1 4.4 3.8   CHLORIDE mmol/L 95* 89* 93* 94*   CO2 mmol/L 29.0 27.2 26.2 26.9   BUN mg/dL 22 23 28* 25*   CREATININE mg/dL 0.86 0.91 0.87 0.81   GLUCOSE mg/dL 116* 133* 210* 76   Estimated Creatinine Clearance: 41.1 mL/min (by C-G formula based on SCr of 0.86 mg/dL).  Results from last 7 days   Lab Units 11/25/23 0246 11/24/23 0736 11/24/23 0002 11/22/23  0601   ALBUMIN g/dL 3.5 4.0 3.7 4.1   BILIRUBIN mg/dL 0.8 1.1 1.0  --    ALK PHOS U/L 190* 265* 209*  --    AST (SGOT) U/L 15 20 28  --    ALT (SGPT) U/L 18 24 24  --      Results from last 7 days   Lab Units 11/25/23 0246 11/24/23 0736 11/24/23  0002 11/22/23  0601 11/21/23  0603 11/20/23  0603 11/19/23  0501   CALCIUM mg/dL 8.0* 9.2 8.7 9.3 9.4 9.4 9.0   ALBUMIN g/dL 3.5 4.0 3.7 4.1 3.9 3.9 3.8   MAGNESIUM mg/dL  --   --  1.7  --   --   --   --    PHOSPHORUS mg/dL  --   --   --  2.8 2.9 2.6 2.7     Results from last 7 days   Lab Units  "11/25/23  0246 11/24/23  1922 11/24/23  1315 11/24/23  1042 11/24/23  0241 11/24/23  0002   PROCALCITONIN ng/mL 0.14  --   --   --   --  0.25   LACTATE mmol/L 4.8* 6.0* 4.2* 5.2*   < >  --     < > = values in this interval not displayed.     COVID19   Date Value Ref Range Status   11/24/2023 Not Detected Not Detected - Ref. Range Final   11/08/2023 Not Detected Not Detected - Ref. Range Final     No results found for: \"HGBA1C\", \"POCGLU\"  Results for orders placed or performed during the hospital encounter of 11/23/23   Blood Culture - Blood, Arm, Left    Specimen: Arm, Left; Blood   Result Value Ref Range    Blood Culture Abnormal Stain (C)     Gram Stain Aerobic Bottle Gram positive cocci in clusters (C)          CT Angiogram Abdomen Pelvis  Narrative: Patient: MARIBELL RUGGIERO  Time Out: 01:24  Exam(s): CTA ABDOMEN + PELVIS     EXAM:    CT Angiography Abdomen and Pelvis With Intravenous Contrast    CLINICAL HISTORY:       Concern for acute aortic syndrome.    TECHNIQUE:    Axial computed tomographic angiography images of the abdomen and pelvis   with intravenous contrast.  CTDI is 37 mGy and DLP is 934 mGy-cm.  This   CT exam was performed according to the principle of ALARA (As Low As   Reasonably Achievable) by using one or more of the following dose   reduction techniques: automated exposure control, adjustment of the mA   and or kV according to patient size, and or use of iterative   reconstruction technique.    MIP reconstructed images were created and reviewed.    COMPARISON:    No relevant prior studies available.    FINDINGS:     VASCULATURE:    Aorta:  No acute findings.  No aortic aneurysm or dissection.    Celiac trunk and mesenteric arteries:  Mild atherosclerosis of the   origin of the celiac trunk, SMA and BETZY without significant stenosis.    Renal arteries:  No acute findings.  No occlusion or significant   stenosis.    Iliac arteries:  Moderate atherosclerosis of the bilateral common iliac "   arteries, external iliac arteries and internal iliac arteries.  No   occlusion or significant stenosis.      Lung bases:  Unremarkable.  No mass.  No consolidation.     ABDOMEN:    Liver:  Nodular contour of the liver is concerning for cirrhosis.    Gallbladder and bile ducts:  Cholecystectomy.  No ductal dilation.    Pancreas:  Unremarkable.  No ductal dilation.  No mass.    Spleen:  Splenomegaly.  The spleen is heterogeneous which could   represent a perfusion anomaly.    Adrenals:  Unremarkable.  No mass.    Kidneys and ureters:  Unremarkable.  No hydronephrosis.  No solid mass.    Stomach and bowel:  Diverticulosis.  No obstruction.  No mucosal   thickening.     PELVIS:    Appendix:  No findings to suggest acute appendicitis.    Bladder:  Unremarkable.  No mass.    Reproductive:  Unremarkable as visualized.     ABDOMEN and PELVIS:    Intraperitoneal space:  Unremarkable.  No significant fluid collection.    No free air.    Bones joints:  There are degenerative changes of the spine.  No acute   fracture.  No dislocation.    Soft tissues:  Unremarkable.    Lymph nodes:  Unremarkable.  No enlarged lymph nodes.    IMPRESSION:       1.  No aortic aneurysm or dissection.  2.  Nodular contour of the liver is concerning for cirrhosis.  3.  The spleen is heterogeneous which could represent a perfusion anomaly.    4.  Splenomegaly is nonspecific but concerning for portal hypertension.  5.  Diverticulosis.  Impression: Electronically signed by Yuki Starr MD on 11-24-23 at 0124  CT Angiogram Chest  Narrative: Patient: MARIBELL RUGGIERO  Time Out: 01:22  Exam(s): CTA CHEST     EXAM:    CT Angiography Chest With Intravenous Contrast    CLINICAL HISTORY:       Concern for acute aortic syndrome.    TECHNIQUE:    Axial computed tomographic angiography images of the chest with   intravenous contrast.  CTDI is 37 mGy and DLP is 934 mGy-cm.  This CT   exam was performed according to the principle of ALARA (As Low As    Reasonably Achievable) by using one or more of the following dose   reduction techniques: automated exposure control, adjustment of the mA   and or kV according to patient size, and or use of iterative   reconstruction technique.    MIP reconstructed images were created and reviewed.    COMPARISON:    No relevant prior studies available.    FINDINGS:    Pulmonary arteries:  Unremarkable.  No pulmonary embolus.    Aorta:  Mild atherosclerosis.  No aortic aneurysm or dissection.    Lungs:  Diffuse bilateral airspace opacities are concerning for   multifocal pneumonia and or aspiration.  Cannot exclude superimposed   pulmonary edema.    Pleural space:  Unremarkable.  No significant effusion.  No   pneumothorax.    Heart:  Unremarkable.  No cardiomegaly.  No significant pericardial   effusion.  No evidence of RV dysfunction.    Bones joints:  There are degenerative changes of the spine.  No acute   fracture.    Soft tissues:  Unremarkable.    Lymph nodes:  Unremarkable.  No enlarged lymph nodes.    IMPRESSION:       1.  No aortic aneurysm or dissection.  2.  No pulmonary embolus.  3.  Diffuse bilateral airspace opacities are concerning for multifocal   pneumonia and or aspiration.  Cannot exclude superimposed pulmonary edema.  Impression: Electronically signed by Yuki Starr MD on 11-24-23 at 0122  XR Chest 1 View  Narrative: Patient: MARIBELL RUGGIERO  Time Out: 01:00  Exam(s): XR CXR 1 VIEW     EXAM:    XR Chest, 1 View    CLINICAL HISTORY:       Nausea and upper abd pain.    TECHNIQUE:    Frontal view of the chest.    COMPARISON:    Chest radiograph 11 20 2023    FINDINGS:    Lungs:  Significantly worsened bilateral airspace opacities.    Pleural space:  New small left pleural effusion.  No pneumothorax.    Heart:  Unremarkable.  No cardiomegaly.    Mediastinum:  Unremarkable.  Normal mediastinal contour.    Bones joints:  There are degenerative changes of the spine.  No acute   fracture.    IMPRESSION:       1.   Significantly worsened bilateral airspace opacities.  2.  New small left pleural effusion.  Impression: Electronically signed by Yuki Starr MD on 11-24-23 at 0100    Scheduled Medications  ampicillin-sulbactam, 3 g, Intravenous, Q6H  apixaban, 5 mg, Oral, BID  aspirin, 81 mg, Oral, Daily  Eucerin original healing lotion, , Topical, Q12H  famotidine, 20 mg, Oral, Daily  lactated ringers, 1,000 mL, Intravenous, Once  Menthol-Zinc Oxide, 1 application , Topical, Q12H  metoprolol succinate XL, 25 mg, Oral, Daily  predniSONE, 20 mg, Oral, Daily With Breakfast   Followed by  [START ON 11/29/2023] predniSONE, 15 mg, Oral, Daily With Breakfast   Followed by  [START ON 12/6/2023] predniSONE, 10 mg, Oral, Daily With Breakfast   Followed by  [START ON 12/13/2023] predniSONE, 5 mg, Oral, Daily With Breakfast  senna-docusate sodium, 2 tablet, Oral, BID  sertraline, 100 mg, Oral, Daily  tiotropium bromide monohydrate, 2 puff, Inhalation, Daily - RT  [START ON 11/26/2023] vancomycin, 1,000 mg, Intravenous, Q24H  vancomycin, 20 mg/kg, Intravenous, Once    Infusions  Pharmacy to dose vancomycin,     Diet  Diet: Cardiac Diets, Diabetic Diets; Healthy Heart (2-3 Na+); Consistent Carbohydrate; Texture: Regular Texture (IDDSI 7); Fluid Consistency: Thin (IDDSI 0)      Assessment/Plan     Active Hospital Problems    Diagnosis  POA    **CAP (community acquired pneumonia) [J18.9]  Yes    Myeloproliferative disorder [D47.1]  Yes      Resolved Hospital Problems   No resolved problems to display.       83 y.o. female admitted with CAP (community acquired pneumonia).      Sepsis secondary to MRSA bacteremia   Lactic Acidosis   -Her white blood cell count is elevated, though this is partially due to the myelodysplastic syndrome.  Lactic acid was 5.2 initially.  -Administer 1000 cc of normal saline at 250 cc an hour and monitor closely for any respiratory compromise.  -She has been started on Unasyn which is an appropriate antibiotic for  aspiration pneumonia, however going through her previous CT scans, it appears that the bilateral pulmonary infiltrates are not new findings and have been present persistently in the past.  -Blood culture x1 with MRSA. Start vancomyin and consult ID  -From going through her previous hospitalization, the bilateral pulmonary infiltrates are not new findings.  -ID consulted      Myelodysplastic syndrome  -Patient takes ojjaara. Oncology following, hold for now     Atrial fibrillation  -Continue Eliquis, metoprolol      Lung mass  Status post bronchoscopy on 11/8/2023 with biopsies.  Pathology showed no evidence of malignancy.  She was previously on IV steroids for prolonged course during her previous hospitalization and has then been transitioned to oral steroids and was discharged today.  Starting to taper.  I will continue this taper for now.       Eliquis for DVT prophylaxis.  Full code.  Discussed with patient and nursing staff.  Anticipate discharge to SNU facility timing yet to be determined.      Junior Doan MD  [facility]  11/25/23  10:13 EST          Electronically signed by Junior Doan MD at 11/25/23 1021          Consult Notes (last 72 hours)        Danisha Reinoso MD at 11/25/23 1629        Consult Orders    1. Inpatient Infectious Diseases Consult [[id number]] ordered by Junior Doan MD at 11/25/23 0856                 Referring Provider: Dr. Doan  Reason for Consultation: MRSA bacteremia    Subjective   History of present illness: This is a 82-year-old female with coronary artery disease type 2 diabetes, peptic ulcer disease and myelofibrosis on Jakafi who was admitted on  November 23 with nausea and vomiting.  Patient was admitted to the hospital at the beginning of October with dyspnea.  She was found to have diastolic heart failure.  Most recently she was admitted to the hospital from November 2 through 22 with abdominal pain.  That time she was found to have reticulonodular  opacities bilaterally and underwent bronchoscopy.  Bacterial cultures were negative.  AFB and fungal cultures are negative to date.  Biopsies did not reveal any evidence of malignancy.  She was discharged on a prednisone taper per pulmonary medicine.  The day after admission the patient started experiencing nausea and vomiting and presented back to the emergency room.  Admission blood work revealed a white blood cell count of 83,000 with a procalcitonin of 0.25.  Repeat imaging of the CAT scan of the chest abdomen pelvis showed persistent bilateral infiltrates.  She was empirically started on Unasyn given concern for aspiration.  Blood cultures are growing MRSA in 1 of 2 blood culture sets and vancomycin was added to her regimen.    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 ENDOSCOPY;  Service: Pulmonary;  Laterality: Bilateral;  PRE- PULMONARY INFILTRATES  POST- SAME    CARDIAC CATHETERIZATION N/A 09/02/2022    Procedure: Coronary angiography;  Surgeon: Guero Verde MD;  Location: [facility] CATH INVASIVE LOCATION;  Service: Cardiovascular;  Laterality: N/A;     CARDIAC CATHETERIZATION N/A 09/02/2022    Procedure: Stent LEFTY coronary;  Surgeon: Guero Verde MD;  Location: [facility] CATH INVASIVE LOCATION;  Service: Cardiovascular;  Laterality: N/A;    CATARACT EXTRACTION  2011    CHOLECYSTECTOMY      CHOLECYSTECTOMY WITH INTRAOPERATIVE CHOLANGIOGRAM N/A 11/28/2022    Procedure: CHOLECYSTECTOMY LAPAROSCOPIC INTRAOPERATIVE CHOLANGIOGRAM;  Surgeon: Dani Grover Jr., MD;  Location: [facility] MAIN OR;  Service: General;  Laterality: N/A;    COLONOSCOPY N/A 02/09/2023    Procedure: COLONOSCOPY TO CECUM & T.I.;  Surgeon: Guero Ferris MD;  Location: [facility] ENDOSCOPY;  Service: Gastroenterology;  Laterality: N/A;  PRE- MELENA, GI BLEED  POST- DIVERTICULOSIS, INT HEMORRHOIDS    ENDOSCOPY N/A 01/25/2023    Procedure: ESOPHAGOGASTRODUODENOSCOPY WITH BIOPSIES;  Surgeon: Charles Diaz MD;  [facility];  Service: Gastroenterology;  Laterality: N/A;  pre: abd pain, nausea  post: hiatal hernia, mild gastritis, sloughing of the esophagus    ENTEROSCOPY SMALL BOWEL N/A 02/09/2023    Procedure: ENTEROSCOPY SMALL BOWEL;  Surgeon: Guero Ferris MD;  Location: [facility] ENDOSCOPY;  Service: Gastroenterology;  Laterality: N/A;  PRE- MELENA, GI BLEED  POST- HIATAL HERNIA    JOINT REPLACEMENT      UPPER GASTROINTESTINAL ENDOSCOPY          reports that she quit smoking about 73 years ago. Her smoking use included cigarettes. She has a 20.00 pack-year smoking history. She has been exposed to tobacco smoke. She has never used smokeless tobacco. She reports that she does not currently use alcohol. She reports that she does not use drugs.    family history includes Lung cancer in her father and sister; Ovarian cancer in her mother.    Allergies   Allergen Reactions    Aspirin Unknown - Low Severity    Diphenhydramine Hcl Anxiety     Benadryl IVP    Hydroxyurea Other (See Comments)     Her hemoglobin drop and was stop in February 2022 and start taking Jakafi    Oxycodone Unknown -  Low Severity       Medication:  Antibiotics:  Unasyn 3 g IV every 6 hours  Vancomycin dosing per pharmacy    Please refer to the medical record for a full medication list    Review of Systems  Pertinent items are noted in HPI, all other systems reviewed and negative    Objective   Vital Signs   Temp:  [97.5 °F (36.4 °C)-99 °F (37.2 °C)] 97.8 °F (36.6 °C)  Heart Rate:  [] 81  Resp:  [18-20] 18  BP: (115-122)/(55-96) 116/96    Physical Exam:   General: Frail-appearing  HEENT: Normocephalic, atraumatic,  no scleral icterus.   Neck: Supple, trachea is midline  Cardiovascular: Normal rate, regular rhythm, normal S1 and S2, no murmurs, rubs, or gallops   Respiratory: Coarse breath sounds bilaterally  GI: Abdomen is soft, nontender, nondistended, positive bowel sounds bilaterally  Musculoskeletal:  no edema, tenderness or deformity  Skin: No rashes   Extremities: No E/C/C  Neurological: Alert and oriented, moving all 4 extremities  Psychiatric: Normal mood and affect     Results Review:   I reviewed the patient's new clinical results.  I reviewed the patient's new imaging results and agree with the interpretation.    Lab Results   Component Value Date    WBC 84.33 (C) 11/25/2023    HGB 8.5 (L) 11/25/2023    HCT 26.3 (L) 11/25/2023    MCV 92.6 11/25/2023     11/25/2023       Lab Results   Component Value Date    GLUCOSE 116 (H) 11/25/2023    BUN 22 11/25/2023    CREATININE 0.86 11/25/2023    EGFRIFNONA 71 07/09/2021    EGFRIFAFRI 81 07/09/2021    BCR 25.6 (H) 11/25/2023    CO2 29.0 11/25/2023    CALCIUM 8.0 (L) 11/25/2023    PROTENTOTREF 7.0 03/21/2023    ALBUMIN 3.5 11/25/2023    LABIL2 1.4 03/21/2023    AST 15 11/25/2023    ALT 18 11/25/2023     Procalcitonin 0.25 -> 0.14    Microbiology:  11/24 BCx MRSA 1/2   11/24 RVP neg    11/8 BAL cx neg    Radiology:  CT angiogram shows no aortic aneurysm or dissection no PE.  Diffuse bilateral opacities concerning for multifocal pneumonia and/or aspiration    CAT scan  of the abdomen pelvis liver cirrhosis.  Status postcholecystectomy.  Normal pancreas.  Splenomegaly.  Normal kidneys ureters and adrenals.  Normal appendix and bladder.    Assessment & Plan   MRSA bacteremia  Myelofibrosis on Jakafi complicating above  Chronic leukocytosis due to above  Bilateral pulmonary infiltrates    Pulmonary infiltrates persist and are not new.  I guess it is possible the patient has recurrent aspiration events.  Would consider pulmonary consultation.  The patient underwent bronchoscopy on  with cultures so far negative.  Discontinue empiric Unasyn.  Continue vancomycin dosing per pharmacy for MRSA bacteremia.  Will obtain repeat blood cultures x 2 tomorrow as well as an echocardiogram.    I discussed the patient's findings and my recommendations with patient and nursing staff            Electronically signed by Danisha Reinoso MD at 23 [phone]       Justice Nagy LCSW at 23 1406        Consult Orders    1. Inpatient [facility] Consult [[id number]] ordered by Junior Doan MD at 23 1212                 Access [facility] evaluated 83-year-old female for depression.  Patient's sister-in-law was in room with patient's permission.  Patient denies any depression but does states she has increased anxiety that she is not used to.  Patient is currently on Zoloft 100 mg daily and 25 mg of as needed Atarax was just added by the hospital doctor.  Patient has not had any yet and Access let the nurse know the patient would like some.  Patient is in the hospital for double pneumonia and states her anxiety goes up when she is short of breath.  Patient lives at [facility] but had been in [facility] rehab for 1 day before she was sent to the hospital.  Patient denies any inpatient psychiatric care in her past.  Patient denies any SI and denies any past attempts.  Patient states she had some very brief grief counseling years ago after a family member .   Patient denies any use of alcohol drugs or tobacco.  Patient states her sleep is not normally very good but was good last night.  Patient's appetite has been off and on.    Patient lives at [facility] assisted living and has been  for several years.  Patient has 5 adult children 4 of whom are out of town.  Patient has 1 daughter here in town as well as a brother and sister-in-law who are her support system.  Patient denies any depressed mood but rates her anxiety as a 7/10.  Access will follow patient to see if the Atarax helps with her anxiety.  If it does not help, a psychiatrist consult may be placed.    Electronically signed by Justice Nagy LCSW at 11/25/23 1413       Ramirez Oliva MD at 11/24/23 1159        Consult Orders    1. Hematology & Oncology Inpatient Consult [[id number]] ordered by Junior Doan MD at 11/24/23 1010                   Subjective     REASON FOR CONSULTATION:    Evaluation and management for primary myelofibrosis                             REQUESTING PHYSICIAN:  Dr. Franki MD    RECORDS OBTAINED:  Records of the patients history including those obtained from the referring provider were reviewed and summarized in detail.    HISTORY OF PRESENT ILLNESS:  The patient is a 83 y.o. year old female with medical history significant for JAK2 positive myeloproliferative disorder, perhaps primary myelofibrosis, A-fib on chronic Eliquis, CAD with stents, history of endocarditis and TIA had presented to [facility] ER on 11/23/2023 with nausea and vomiting.  Patient was discharged just a day before after prolonged hospitalization for ESBL UTI, acute renal failure and respiratory distress.    Patient states that she initially reached the rehab facility, she started to feel bad.  She developed nausea and vomiting and this was brought back to the hospital.  Work-up in the ER with CT scans did not show any acute abnormalities.  Persistent bilateral pulmonary opacities,  concerning for multifocal pneumonia and/or aspiration.    Hematology/oncology has been consulted for further evaluation and management.  Patient is well-known to our service and follows up with Dr. Pate for primary myelofibrosis management.  She was recently started on Ojjara for her disease.    Past Medical History:   Diagnosis Date    Atherosclerosis of abdominal aorta 02/05/2023    Atrial fibrillation     Breast cancer     CAD (coronary artery disease)     NSTEMI 2/2022: 90% ostial LAD, 99% D1, 70% mid-distal LAD (medical therapy). She received two stents (2.5x18 and 2.5x26mm [place] LEFTY) but I don't know which one went to which lesion.    Carotid atherosclerosis     Cholecystitis 11/22/2022    Added automatically from request for surgery [id number]    Chronic diastolic (congestive) heart failure     COVID 10/29/2022    GERD (gastroesophageal reflux disease)     Glaucoma     History of cataract     Hypertension     Microcytic anemia     per Dr. oleg paet office  note 6/30/22-dd    Myeloproliferative disorder     JAK2 positive    Nonbacterial thrombotic endocarditis     6/2021: 4x5mm vegetation on the ventricular surface of the anterior MV, negative blood cultures    Porcelain gallbladder     PUD (peptic ulcer disease)     TIA (transient ischemic attack)     Type 2 diabetes mellitus     Type 2 diabetes mellitus with circulatory disorder, without long-term current use of insulin 07/14/2022    Upper GI bleed         Past Surgical History:   Procedure Laterality Date    BREAST LUMPECTOMY  1999    BRONCHOSCOPY Bilateral 11/8/2023    Procedure: BRONCHOSCOPY UNDER FLUORO WITH BAL,  BIOPSIES; ACETYLCYSTEIN 4ML GIVEN;  Surgeon: Raoul Alford MD;  Location: [facility] ENDOSCOPY;  Service: Pulmonary;  Laterality: Bilateral;  PRE- PULMONARY INFILTRATES  POST- SAME    CARDIAC CATHETERIZATION N/A 09/02/2022    Procedure: Coronary angiography;  Surgeon: Guero Verde MD;  Location: [facility] CATH INVASIVE LOCATION;  Service:  Cardiovascular;  Laterality: N/A;    CARDIAC CATHETERIZATION N/A 09/02/2022    Procedure: Stent LEFTY coronary;  Surgeon: Guero Verde MD;  Location: [facility] CATH INVASIVE LOCATION;  Service: Cardiovascular;  Laterality: N/A;    CATARACT EXTRACTION  2011    CHOLECYSTECTOMY      CHOLECYSTECTOMY WITH INTRAOPERATIVE CHOLANGIOGRAM N/A 11/28/2022    Procedure: CHOLECYSTECTOMY LAPAROSCOPIC INTRAOPERATIVE CHOLANGIOGRAM;  Surgeon: Dani Grover Jr., MD;  Location: [facility] MAIN OR;  Service: General;  Laterality: N/A;    COLONOSCOPY N/A 02/09/2023    Procedure: COLONOSCOPY TO CECUM & T.I.;  Surgeon: Guero Ferris MD;  Location: [facility] ENDOSCOPY;  Service: Gastroenterology;  Laterality: N/A;  PRE- MELENA, GI BLEED  POST- DIVERTICULOSIS, INT HEMORRHOIDS    ENDOSCOPY N/A 01/25/2023    Procedure: ESOPHAGOGASTRODUODENOSCOPY WITH BIOPSIES;  Surgeon: Charles Diaz MD;  [facility] END[facility]COPY;  Service: Gastroenterology;  Laterality: N/A;  pre: abd pain, nausea  post: hiatal hernia, mild gastritis, sloughing of the esophagus    ENTEROSCOPY SMALL BOWEL N/A 02/09/2023    Procedure: ENTEROSCOPY SMALL BOWEL;  Surgeon: Guero Ferris MD;  Location: [facility] ENDOSCOPY;  Service: Gastroenterology;  Laterality: N/A;  PRE- MELENA, GI BLEED  POST- HIATAL HERNIA    JOINT REPLACEMENT      UPPER GASTROINTESTINAL ENDOSCOPY          No current facility-administered medications on file prior to encounter.     Current Outpatient Medications on File Prior to Encounter   Medication Sig Dispense Refill    acetaminophen (TYLENOL) 500 MG tablet Take 1 tablet by mouth As Needed.      apixaban (ELIQUIS) 5 MG tablet tablet Take 1 tablet by mouth 2 (Two) Times a Day. 180 tablet 3    aspirin 81 MG EC tablet Take 1 tablet by mouth Daily.      bisacodyl (DULCOLAX) 10 MG suppository Insert 1 suppository into the rectum Daily As Needed for Constipation (Use if bisacodyl oral is ineffective).      budesonide-formoterol (SYMBICORT) 160-4.5 MCG/ACT  inhaler Inhale 2 puffs 2 (Two) Times a Day.      cadexomer iodine (IODOSORB) 0.9 % gel Apply 1 application  topically to the appropriate area as directed Daily.      famotidine (PEPCID) 20 MG tablet Take 1 tablet by mouth Daily.      furosemide (LASIX) 20 MG tablet Take 0.5 tablets by mouth Daily.      latanoprost (XALATAN) 0.005 % ophthalmic solution Administer 1 drop to both eyes.      metoprolol succinate XL (TOPROL-XL) 25 MG 24 hr tablet Take 1 tablet by mouth Daily. 90 tablet 2    Momelotinib Dihydrochloride (OJJAARA) 200 MG tablet Take 1 tablet by mouth Daily. 28 tablet 3    polyethylene glycol (MIRALAX) 17 g packet Take 17 g by mouth Daily As Needed (Use if senna-docusate is ineffective).      predniSONE (DELTASONE) 5 MG tablet Take 4 tablets by mouth Daily With Breakfast for 6 days, THEN 3 tablets Daily With Breakfast for 7 days, THEN 2 tablets Daily With Breakfast for 7 days, THEN 1 tablet Daily With Breakfast for 7 days. 66 tablet 0    saccharomyces boulardii (FLORASTOR) 250 MG capsule Take 1 capsule by mouth 2 (Two) Times a Day. 60 capsule 0    sertraline (ZOLOFT) 100 MG tablet Take 1 tablet by mouth Daily.      simethicone (MYLICON) 80 MG chewable tablet Chew 1 tablet by mouth 4 (Four) Times a Day As Needed for Flatulence. 100 tablet 0    tiotropium bromide monohydrate (SPIRIVA RESPIMAT) 2.5 MCG/ACT aerosol solution inhaler Inhale 2 puffs Daily.          ALLERGIES:    Allergies   Allergen Reactions    Aspirin Unknown - Low Severity    Diphenhydramine Hcl Anxiety     Benadryl IVP    Hydroxyurea Other (See Comments)     Her hemoglobin drop and was stop in February 2022 and start taking Jakafi    Oxycodone Unknown - Low Severity        Social History     Socioeconomic History    Marital status:    Tobacco Use    Smoking status: Former     Packs/day: 1.00     Years: 20.00     Additional pack years: 0.00     Total pack years: 20.00     Types: Cigarettes     Quit date: 11/24/1950     Years since  "quittin.0     Passive exposure: Past    Smokeless tobacco: Never    Tobacco comments:     30  years ago   Vaping Use    Vaping Use: Never used   Substance and Sexual Activity    Alcohol use: Not Currently     Comment: \"rare\"    Drug use: Never    Sexual activity: Defer        Family History   Problem Relation Age of Onset    Ovarian cancer Mother     Lung cancer Father     Lung cancer Sister     Malig Hyperthermia Neg Hx         Review of Systems   As per HPI    Objective     Vitals:    23 0335 23 0445 23 0655 23 1131   BP:  124/72 115/67 118/62   BP Location:  Right arm Right arm Right arm   Patient Position:  Lying Lying Lying   Pulse: 113 105 59 106   Resp:  18 18 18   Temp:  97.7 °F (36.5 °C) 97.9 °F (36.6 °C) 97.5 °F (36.4 °C)   TempSrc:  Oral Oral    SpO2: 98% 92% 92% 94%   Weight:  61.5 kg (135 lb 9.3 oz)     Height:             10/18/2023     4:02 PM   Current Status   ECOG score 2       Physical Exam    CONSTITUTIONAL:  Vital signs reviewed.  No distress, looks comfortable.  EYES:  Conjunctiva and lids unremarkable.    EARS,NOSE,MOUTH,THROAT:  Ears and nose appear unremarkable.    RESPIRATORY:  Normal respiratory effort.  On 2-3 L nasal cannula oxygen  CARDIOVASCULAR:  Normal heart rate.  GASTROINTESTINAL: Abdomen appears unremarkable.  Nondistended   LYMPHATIC:  No cervical, supraclavicular lymphadenopathy.  SKIN:  Warm.  No rashes.  PSYCHIATRIC:  Normal judgment and insight.  Normal mood and affect.  NEURO: AAOx3, no obvious focal deficits.      RECENT LABS:  Hematology WBC   Date Value Ref Range Status   2023 83.18 (C) 3.40 - 10.80 10*3/mm3 Final     RBC   Date Value Ref Range Status   2023 3.40 (L) 3.77 - 5.28 10*6/mm3 Final     Hemoglobin   Date Value Ref Range Status   2023 10.1 (L) 12.0 - 15.9 g/dL Final     Hematocrit   Date Value Ref Range Status   2023 31.4 (L) 34.0 - 46.6 % Final     Platelets   Date Value Ref Range Status   2023 298 140 " - 450 10*3/mm3 Final          Assessment & Plan   Patient is a pleasant 83-year-old female with medical history significant for JAK2 positive myeloproliferative disorder, perhaps primary myelofibrosis, A-fib on chronic Eliquis, CAD with stents, history of endocarditis and TIA admitted with nausea and vomiting.      #Nausea and vomiting due to suspected sepsis and pneumonia related:  Patient had presented to [facility] ER on 11/23/2023 with nausea and vomiting.  Patient was discharged just a day before after prolonged hospitalization for ESBL UTI, acute renal failure and respiratory distress.  Patient states that she initially reached the rehab facility, she started to feel bad.  She developed nausea and vomiting and this was brought back to the hospital.  Work-up in the ER with CT scans did not show any acute abnormalities.  Persistent bilateral pulmonary opacities, concerning for multifocal pneumonia and/or aspiration.  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  This is likely primary myelofibrosis +/-steroids and infection related  Will hold Ojjara while inpatient as it can be associated with nausea and vomiting    #Leukocytosis: As above    #Liver cirrhosis and splenomegaly    #Chronic hypoxic respiratory failure:  She was evaluated with bronchoscopy with biopsies on 11/8/2023.  No evidence of malignancy.  Continue with gradual steroids taper  Antibiotics as above    #History of A-fib on Eliquis    Recommendations:  -Hold Ojjara while inpatient.  -Continue antibiotics and IV fluids as per primary  -Continue gradual taper of steroids  -Will follow in periphery.    She has an appointment scheduled with our office on 12/20/2023.  Please call us with any questions    Electronically signed by Ramirez Oliva  MD Gilberto at 11/24/23 2851

## 2023-11-27 NOTE — PROGRESS NOTES
Name: Catherine Boyer ADMIT: 2023   : 1940  PCP: Brett Jackson MD    MRN: [id number] LOS: 3 days   AGE/SEX: 83 y.o. female  ROOM: [facility]     Subjective     No CP  Dyspnea fair  On oxygen  On IV ABX  Had ECHO    Objective   Objective   Vital Signs  Temp:  [97.7 °F (36.5 °C)-98.4 °F (36.9 °C)] 98.4 °F (36.9 °C)  Heart Rate:  [] 109  Resp:  [18] 18  BP: ()/(47-81) 113/60  SpO2:  [96 %-100 %] 98 %  on  Flow (L/min):  [2] 2;   Device (Oxygen Therapy): nasal cannula  Body mass index is 26.52 kg/m².  Physical Exam  Constitutional:       General: She is not in acute distress.     Appearance: She is ill-appearing.   HENT:      Head: Normocephalic and atraumatic.   Cardiovascular:      Rate and Rhythm: Normal rate and regular rhythm.   Pulmonary:      Effort: Respiratory distress (slight, decreased bs at bases) present.   Abdominal:      General: There is no distension.      Palpations: Abdomen is soft.      Tenderness: There is no abdominal tenderness.   Skin:     Coloration: Skin is pale.   Neurological:      Mental Status: She is alert and oriented to person, place, and time.         Results Review     I reviewed the patient's new clinical results.  Results from last 7 days   Lab Units 23  0002   WBC 10*3/mm3 87.18* 84.33* 83.18* 63.14*   HEMOGLOBIN g/dL 8.1* 8.5* 10.1* 9.4*   PLATELETS 10*3/mm3 291 293 298 232     Results from last 7 days   Lab Units 2336 23  0002   SODIUM mmol/L  --  139 140 139 135*   POTASSIUM mmol/L 4.2 2.7* 3.3* 4.1 4.4   CHLORIDE mmol/L  --  97* 95* 89* 93*   CO2 mmol/L  --  27.1 29.0 27.2 26.2   BUN mg/dL  --  15 22 23 28*   CREATININE mg/dL  --  0.76 0.86 0.91 0.87   GLUCOSE mg/dL  --  65 116* 133* 210*   Estimated Creatinine Clearance: 48.2 mL/min (by C-G formula based on SCr of 0.76 mg/dL).  Results from last 7 days   Lab Units 23  2905  "11/24/23  0736 11/24/23  0002 11/22/23  0601   ALBUMIN g/dL 3.5 4.0 3.7 4.1   BILIRUBIN mg/dL 0.8 1.1 1.0  --    ALK PHOS U/L 190* 265* 209*  --    AST (SGOT) U/L 15 20 28  --    ALT (SGPT) U/L 18 24 24  --      Results from last 7 days   Lab Units 11/26/23  0338 11/25/23  0246 11/24/23  0736 11/24/23  0002 11/22/23  0601 11/21/23  0603   CALCIUM mg/dL 8.3* 8.0* 9.2 8.7 9.3 9.4   ALBUMIN g/dL  --  3.5 4.0 3.7 4.1 3.9   MAGNESIUM mg/dL  --  1.9  --  1.7  --   --    PHOSPHORUS mg/dL  --  4.3  --   --  2.8 2.9     Results from last 7 days   Lab Units 11/25/23  0246 11/24/23  1922 11/24/23  1315 11/24/23  1042 11/24/23  0241 11/24/23  0002   PROCALCITONIN ng/mL 0.14  --   --   --   --  0.25   LACTATE mmol/L 4.8* 6.0* 4.2* 5.2*   < >  --     < > = values in this interval not displayed.     COVID19   Date Value Ref Range Status   11/24/2023 Not Detected Not Detected - Ref. Range Final   11/08/2023 Not Detected Not Detected - Ref. Range Final     No results found for: \"HGBA1C\", \"POCGLU\"  Results for orders placed or performed during the hospital encounter of 11/23/23   Blood Culture - Blood, Arm, Left    Specimen: Arm, Left; Blood   Result Value Ref Range    Blood Culture Staphylococcus aureus, MRSA (C)     Isolated from Aerobic Bottle     Gram Stain Aerobic Bottle Gram positive cocci in clusters (C)        Susceptibility    Staphylococcus aureus, MRSA - BIJAN*     Gentamicin  Susceptible ug/ml     Oxacillin  Resistant ug/ml     Rifampin  Susceptible ug/ml     Vancomycin  Susceptible ug/ml     * This isolate is presumed to be clindamycin resistant based on detection of inducible clindamycin resistance.  Clindamycin may still be effective in some patients.         Adult Transthoracic Echo Complete W/ Cont if Necessary Per Protocol    Left ventricular systolic function is normal. Calculated left   ventricular EF = 55.3% Normal left ventricular cavity size noted. Left   ventricular wall thickness is consistent with moderate " concentric   hypertrophy. All left ventricular wall segments contract normally. Left   ventricular diastolic function is consistent with (grade II w/high LAP)   pseudonormalization.    The left atrial cavity is moderately dilated. Left atrial volume is   underestimated  The right atrial cavity is moderately dilated.    The aortic valve is abnormal in structure. There is severe thickening   of the non-coronary cusp(s) of the aortic valve. There is a mobile   undulating mass on what appears to be the ventricular surface of the   aortic valve though a clear attachment is not visualized. It is highly   suspicious for vegetation. Cannot determine if this is a bileaflet or   trileaflet valve Trace aortic valve regurgitation is present. Mild aortic   valve stenosis is present.    There is moderate, bileaflet mitral valve thickening present. Mild to   moderate mitral valve regurgitation is present. Mild mitral valve stenosis   is present. The mean mitral valve gradient is 5 mm Hg at a heart rate of   97 bpm    Trace tricuspid valve regurgitation is present. Estimated right   ventricular systolic pressure from tricuspid regurgitation is moderately   elevated (45-55 mmHg). Calculated right ventricular systolic pressure from   tricuspid regurgitation is 45 mmHg.    Discussed with patient's nurse on floor who will contact primary team   for cardiology consult    Scheduled Medications  apixaban, 5 mg, Oral, BID  aspirin, 81 mg, Oral, Daily  Eucerin original healing lotion, , Topical, Q12H  lactated ringers, 1,000 mL, Intravenous, Once  Menthol-Zinc Oxide, 1 application , Topical, Q12H  metoprolol succinate XL, 25 mg, Oral, Daily  pantoprazole, 40 mg, Intravenous, Q AM  predniSONE, 20 mg, Oral, Daily With Breakfast   Followed by  [START ON 11/29/2023] predniSONE, 15 mg, Oral, Daily With Breakfast   Followed by  [START ON 12/6/2023] predniSONE, 10 mg, Oral, Daily With Breakfast   Followed by  [START ON 12/13/2023] predniSONE, 5  mg, Oral, Daily With Breakfast  senna-docusate sodium, 2 tablet, Oral, BID  sertraline, 100 mg, Oral, Daily  tiotropium bromide monohydrate, 2 puff, Inhalation, Daily - RT  vancomycin, 1,000 mg, Intravenous, Q24H    Infusions  Pharmacy to dose vancomycin,     Diet  NPO Diet NPO Type: Strict NPO       Assessment/Plan     Active Hospital Problems    Diagnosis  POA    **MRSA bacteremia [R78.81, B95.62]  Yes    CAP (community acquired pneumonia) [J18.9]  Yes    Myeloproliferative disorder [D47.1]  Yes    Type 2 diabetes mellitus with circulatory disorder, 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       83 y.o. female admitted with MRSA bacteremia.      Sepsis secondary to MRSA bacteremia   Lactic Acidosis   -Her white blood cell count is elevated, though this is partially due to the myelodysplastic syndrome.  Lactic acid was 5.2 initially.  -on IV Vanc, ID following   -Cardiology consult for endocarditis      Myelodysplastic syndrome  -Patient takes ojjaara. Oncology following, hold for now     Atrial fibrillation  -Continue Eliquis, metoprolol      Lung infiltrates  Status post bronchoscopy on 11/8/2023 with biopsies.  Pathology showed no evidence of malignancy.  She was previously on IV steroids for prolonged course during her previous hospitalization and has then been transitioned to oral steroids and was discharged.  Starting to taper.  Plan was to have follow up CT chest 6-8 weeks from 11/10/23 with LPC    DM  A1c 6.6. Not currently on medications.       Eliquis for DVT prophylaxis.  Full code.  Discussed with patient and nursing staff.  Anticipate discharge to SNU facility timing yet to be determined.      Ugo Case MD  [facility]  11/27/23  10:42 EST

## 2023-11-27 NOTE — PROGRESS NOTES
Subjective     HISTORY OF PRESENT ILLNESS:   She states she is feeling dramatically better today no nausea vomiting.  Breathing stable.    Past Medical History, Past Surgical History, Social History, Family History have been reviewed and are without significant changes except as mentioned.    Review of Systems   A comprehensive 14 point review of systems was performed and was negative except as mentioned.    Medications:  The current medication list was reviewed in the EMR    ALLERGIES:    Allergies   Allergen Reactions    Aspirin Unknown - Low Severity    Diphenhydramine Hcl Anxiety     Benadryl IVP    Hydroxyurea Other (See Comments)     Her hemoglobin drop and was stop in February 2022 and start taking Jakafi    Oxycodone Unknown - Low Severity       Objective      Vitals:    11/27/23 0837 11/27/23 1207 11/27/23 1209 11/27/23 1306   BP: 113/60   94/51   BP Location: Right arm   Left arm   Patient Position: Lying   Lying   Pulse: 109 97 96 99   Resp: 18 20 18   Temp:    98.2 °F (36.8 °C)   TempSrc:    Oral   SpO2: 98% 95% 94% 92%   Weight:       Height:             10/18/2023     4:02 PM   Current Status   ECOG score 2       Physical Exam    CONSTITUTIONAL:  Vital signs reviewed.  Elderly lady lying in bed no acute distress  EYES:  Conjunctiva and lids unremarkable.    EARS,NOSE,MOUTH,THROAT:  Ears and nose appear unremarkable.    RESPIRATORY:  Normal respiratory effort.  O2 by nasal cannula in place  CARDIOVASCULAR:  Normal heart rate  GASTROINTESTINAL: Abdomen appears unremarkable.  Nondistended.  Splenomegaly   LYMPHATIC:  No cervical, supraclavicular lymphadenopathy.  SKIN:  Warm.  No rashes.  PSYCHIATRIC:  Normal judgment and insight.  Normal mood and affect.  NEURO: AAOx3, no obvious focal deficits.      RECENT LABS:  Hematology WBC   Date Value Ref Range Status   11/27/2023 98.25 (C) 3.40 - 10.80 10*3/mm3 Final     RBC   Date Value Ref Range Status   11/27/2023 2.66 (L) 3.77 - 5.28 10*6/mm3 Final      Hemoglobin   Date Value Ref Range Status   11/27/2023 7.5 (L) 12.0 - 15.9 g/dL Final     Hematocrit   Date Value Ref Range Status   11/27/2023 24.4 (L) 34.0 - 46.6 % Final     Platelets   Date Value Ref Range Status   11/27/2023 300 140 - 450 10*3/mm3 Final              Assessment & Plan   Patient is a pleasant 83-year-old female with medical history significant for JAK2 positive myeloproliferative disorder, perhaps primary myelofibrosis, A-fib on chronic Eliquis, CAD with stents, history of endocarditis and TIA admitted with nausea and vomiting.       #Nausea and vomiting due to suspected sepsis and pneumonia related:  Patient had presented to [facility] ER on 11/23/2023 with nausea and vomiting.  Patient was discharged just a day before after prolonged hospitalization for ESBL UTI, acute renal failure and respiratory distress.  Patient states that she initially reached the rehab facility, she started to feel bad.  She developed nausea and vomiting and this was brought back to the hospital.  Work-up in the ER with CT scans did not show any acute abnormalities.  Persistent bilateral pulmonary opacities, concerning for multifocal pneumonia and/or aspiration.  Elevated lactic acid level of 5.2  11/26: Continues to have N/V.  Started on IV Protonix daily.  Encouraged to ask for Zofran.  Lactate improved to 4.8.  Continue IV fluids and antibiotics as per primary. 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  This is likely primary myelofibrosis +/-steroids and infection related  White count increasing-98.2 WBC with hemoglobin 7.5 since holding low momelotinib     #Leukocytosis: As above    #MRSA bacteremia:  ID managing, on vancomycin    #Liver cirrhosis and splenomegaly     #Chronic hypoxic respiratory failure:  She was evaluated with  bronchoscopy with biopsies on 11/8/2023.  No evidence of malignancy.  Continue with gradual steroids taper-per admitting/pulmonary medicine  Antibiotics as above     #History of A-fib on Eliquis     Recommendations:  -Restart Ojjara given increasing leukocytosis/worsening anemia-monitor for recurrence of nausea/vomiting  -Daily CBC with differential

## 2023-11-28 NOTE — PROGRESS NOTES
Name: Catherine Boyer ADMIT: 2023   : 1940  PCP: Brett Jackson MD    MRN: [id number] LOS: 4 days   AGE/SEX: 83 y.o. female  ROOM: [facility]     Subjective     No CP  Dyspnea fair  On oxygen  On IV ABX    Working some with therapy  Discussed palliative care- she still wants treatment and work towards discharge, but she states she would want to be DNR/DNI    Objective   Objective   Vital Signs  Temp:  [97.9 °F (36.6 °C)] 97.9 °F (36.6 °C)  Heart Rate:  [] 101  Resp:  [18-20] 18  BP: ()/(46-57) 125/53  SpO2:  [84 %-96 %] 96 %  on  Flow (L/min):  [3-5] 5;   Device (Oxygen Therapy): nasal cannula  Body mass index is 26.13 kg/m².  Physical Exam  Constitutional:       General: She is not in acute distress.     Appearance: She is ill-appearing.   HENT:      Head: Normocephalic and atraumatic.   Cardiovascular:      Rate and Rhythm: Normal rate and regular rhythm.   Pulmonary:      Effort: Respiratory distress (slight, decreased bs at bases) present.   Abdominal:      General: There is no distension.      Palpations: Abdomen is soft.      Tenderness: There is no abdominal tenderness.   Skin:     Coloration: Skin is pale.   Neurological:      Mental Status: She is alert and oriented to person, place, and time.         Results Review     I reviewed the patient's new clinical results.  Results from last 7 days   Lab Units 23  0505 23  0950 23  0338 23  0246   WBC 10*3/mm3 98.09* 98.25* 87.18* 84.33*   HEMOGLOBIN g/dL 7.4* 7.5* 8.1* 8.5*   PLATELETS 10*3/mm3 268 300 291 293     Results from last 7 days   Lab Units 23  0505 23  0950 23  2325 238 23  0246   SODIUM mmol/L 135* 135*  --  139 140   POTASSIUM mmol/L 4.7 4.0 4.2 2.7* 3.3*   CHLORIDE mmol/L 97* 95*  --  97* 95*   CO2 mmol/L 24.0 26.5  --  27.1 29.0   BUN mg/dL 18 15  --  15 22   CREATININE mg/dL 0.80 0.83  --  0.76 0.86   GLUCOSE mg/dL 80 69  --  65 116*   Estimated Creatinine  "Clearance: 45.5 mL/min (by C-G formula based on SCr of 0.8 mg/dL).  Results from last 7 days   Lab Units 11/25/23  0246 11/24/23  0736 11/24/23  0002 11/22/23  0601   ALBUMIN g/dL 3.5 4.0 3.7 4.1   BILIRUBIN mg/dL 0.8 1.1 1.0  --    ALK PHOS U/L 190* 265* 209*  --    AST (SGOT) U/L 15 20 28  --    ALT (SGPT) U/L 18 24 24  --      Results from last 7 days   Lab Units 11/28/23  0505 11/27/23  0950 11/26/23  0338 11/25/23  0246 11/24/23  0736 11/24/23  0002 11/22/23  0601   CALCIUM mg/dL 8.7 8.4* 8.3* 8.0* 9.2 8.7 9.3   ALBUMIN g/dL  --   --   --  3.5 4.0 3.7 4.1   MAGNESIUM mg/dL  --   --   --  1.9  --  1.7  --    PHOSPHORUS mg/dL  --   --   --  4.3  --   --  2.8     Results from last 7 days   Lab Units 11/25/23  0246 11/24/23  1922 11/24/23  1315 11/24/23  1042 11/24/23  0241 11/24/23  0002   PROCALCITONIN ng/mL 0.14  --   --   --   --  0.25   LACTATE mmol/L 4.8* 6.0* 4.2* 5.2*   < >  --     < > = values in this interval not displayed.     COVID19   Date Value Ref Range Status   11/24/2023 Not Detected Not Detected - Ref. Range Final   11/08/2023 Not Detected Not Detected - Ref. Range Final     No results found for: \"HGBA1C\", \"POCGLU\"  Results for orders placed or performed during the hospital encounter of 11/23/23   Blood Culture - Blood, Arm, Left    Specimen: Arm, Left; Blood   Result Value Ref Range    Blood Culture No growth at 2 days          Adult Transthoracic Echo Complete W/ Cont if Necessary Per Protocol    Left ventricular systolic function is normal. Calculated left   ventricular EF = 55.3% Normal left ventricular cavity size noted. Left   ventricular wall thickness is consistent with moderate concentric   hypertrophy. All left ventricular wall segments contract normally. Left   ventricular diastolic function is consistent with (grade II w/high LAP)   pseudonormalization.    The left atrial cavity is moderately dilated. Left atrial volume is   underestimated  The right atrial cavity is moderately " dilated.    The aortic valve is abnormal in structure. There is severe thickening   of the non-coronary cusp(s) of the aortic valve. There is a mobile   undulating mass on what appears to be the ventricular surface of the   aortic valve though a clear attachment is not visualized. It is highly   suspicious for vegetation. Cannot determine if this is a bileaflet or   trileaflet valve Trace aortic valve regurgitation is present. Mild aortic   valve stenosis is present.    There is moderate, bileaflet mitral valve thickening present. Mild to   moderate mitral valve regurgitation is present. Mild mitral valve stenosis   is present. The mean mitral valve gradient is 5 mm Hg at a heart rate of   97 bpm    Trace tricuspid valve regurgitation is present. Estimated right   ventricular systolic pressure from tricuspid regurgitation is moderately   elevated (45-55 mmHg). Calculated right ventricular systolic pressure from   tricuspid regurgitation is 45 mmHg.    Discussed with patient's nurse on floor who will contact primary team   for cardiology consult    Scheduled Medications  apixaban, 5 mg, Oral, BID  aspirin, 81 mg, Oral, Daily  Eucerin original healing lotion, , Topical, Q12H  lactated ringers, 1,000 mL, Intravenous, Once  Menthol-Zinc Oxide, 1 application , Topical, Q12H  metoprolol succinate XL, 25 mg, Oral, Daily  Momelotinib Dihydrochloride, 200 mg, Oral, Q24H  pantoprazole, 40 mg, Intravenous, Q AM  [START ON 11/29/2023] predniSONE, 15 mg, Oral, Daily With Breakfast   Followed by  [START ON 12/6/2023] predniSONE, 10 mg, Oral, Daily With Breakfast   Followed by  [START ON 12/13/2023] predniSONE, 5 mg, Oral, Daily With Breakfast  senna-docusate sodium, 2 tablet, Oral, BID  sertraline, 100 mg, Oral, Daily  tiotropium bromide monohydrate, 2 puff, Inhalation, Daily - RT  vancomycin, 1,000 mg, Intravenous, Q24H    Infusions  Pharmacy to dose vancomycin,     Diet  Diet: Cardiac Diets, Diabetic Diets; Healthy Heart (2-3  Na+); Consistent Carbohydrate; Texture: Regular Texture (IDDSI 7); Fluid Consistency: Thin (IDDSI 0)       Assessment/Plan     Active Hospital Problems    Diagnosis  POA    **MRSA bacteremia [R78.81, B95.62]  Yes    CAP (community acquired pneumonia) [J18.9]  Yes    Myeloproliferative disorder [D47.1]  Yes    Type 2 diabetes mellitus with circulatory disorder, 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       83 y.o. female admitted with MRSA bacteremia.      Sepsis secondary to MRSA bacteremia   Lactic Acidosis   -Her white blood cell count is elevated, though this is partially due to the myelodysplastic syndrome.  Lactic acid was 5.2 initially.  -on IV Vanc, ID following   -Cardiology has seen. ECHO without change from previous echo so no indication for any CHREI or surgery with ok valves.      Myelodysplastic syndrome  -Patient takes ojjaara. Oncology following and plan to restart. Monitor WBC and anemia.      Atrial fibrillation  -Continue Eliquis, metoprolol      Lung infiltrates  Status post bronchoscopy on 11/8/2023 with biopsies.  Pathology showed no evidence of malignancy.  She was previously on IV steroids for prolonged course during her previous hospitalization and has then been transitioned to oral steroids and was discharged.  Starting to taper.  Plan was to have follow up CT chest 6-8 weeks from 11/10/23 with [facility]    DM  A1c 6.6. Not currently on medications.     Discussed palliative care- she still wants treatment and work towards discharge, but she states she would want to be DNR/DNI    Eliquis for DVT prophylaxis.  Full code.  Discussed with patient and nursing staff.  Anticipate discharge to SNU facility timing yet to be determined. Perhaps ~2 days    Discussed case at length with her son last night on the phone     Ugo Ragsdale  MD Manpreet  [facility]  11/28/23  13:59 EST

## 2023-11-28 NOTE — CASE MANAGEMENT/SOCIAL WORK
Continued Stay Note  [facility]     Patient Name: Catherine Boyer  MRN: [id number]  Today's Date: 11/28/2023    Admit Date: 11/23/2023    Plan: SNF vs Palliative   Discharge Plan       Row Name 11/28/23 1225       Plan    Plan SNF vs Palliative    Plan Comments Spoke with pt re disposition plans. Pt would like to speak to MD re palliative care/comfort care vs further treatment. Informed [facility] of pts wish.                   Discharge Codes    No documentation.                 Expected Discharge Date and Time       Expected Discharge Date Expected Discharge Time    Nov 30, 2023               Cherry Watts RN

## 2023-11-28 NOTE — THERAPY EVALUATION
Patient Name: Catherine Boyer  : 1940    MRN: [id number]                              Today's Date: 2023       Admit Date: 2023    Visit Dx:     ICD-10-CM ICD-9-CM   1. Community acquired pneumonia, unspecified laterality  J18.9 486   2. Hypoxia  R09.02 799.02   3. Septic shock  A41.9 038.9    R65.21 785.52     995.92     Patient Active Problem List   Diagnosis    Acute on chronic diastolic heart failure    CAD (coronary artery disease)    Nonbacterial thrombotic endocarditis    Paroxysmal atrial fibrillation    Myeloproliferative disorder    Microcytic anemia    Type 2 diabetes mellitus with circulatory disorder, without long-term current use of insulin    GERD (gastroesophageal reflux disease)    Hypertension    Personal history of transient ischemic attack (TIA), 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 deficits    Porcelain gallbladder    Breast cancer    Atherosclerosis of abdominal aorta    APC (atrial premature contractions)    Moderate malnutrition    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    Elevated troponin    TIA (transient ischemic attack)    PUD (peptic ulcer disease)    Anemia    Tachycardia    Myelofibrosis    Normocytic anemia    Acute kidney injury    Pulmonary infiltrate    Iatrogenic pneumothorax    CAP (community acquired pneumonia)    MRSA bacteremia     Past Medical History:   Diagnosis Date    Atherosclerosis of abdominal aorta 2023    Atrial fibrillation     Breast cancer     CAD (coronary artery disease)     NSTEMI 2022: 90% ostial LAD, 99% D1, 70% mid-distal LAD (medical therapy). She received two stents (2.5x18 and 2.5x26mm [place] LEFTY) but I don't know which one went to which lesion.    Carotid atherosclerosis     Cholecystitis 2022    Added automatically from request for surgery [id number]    Chronic diastolic (congestive) heart failure     COVID 10/29/2022    GERD (gastroesophageal reflux disease)     Glaucoma     History of cataract     Hypertension     Microcytic  anemia     per Dr. oleg pate office  note 6/30/22-dd    Myeloproliferative disorder     JAK2 positive    Nonbacterial thrombotic endocarditis     6/2021: 4x5mm vegetation on the ventricular surface of the anterior MV, negative blood cultures    Porcelain gallbladder     PUD (peptic ulcer disease)     TIA (transient ischemic attack)     Type 2 diabetes mellitus     Type 2 diabetes mellitus with circulatory disorder, without long-term current use of insulin 07/14/2022    Upper GI bleed      Past Surgical History:   Procedure Laterality Date    BREAST LUMPECTOMY  1999    BRONCHOSCOPY Bilateral 11/8/2023    Procedure: BRONCHOSCOPY UNDER FLUORO WITH BAL,  BIOPSIES; ACETYLCYSTEIN 4ML GIVEN;  Surgeon: Raoul Alford MD;  Location: [facility] ENDOSCOPY;  Service: Pulmonary;  Laterality: Bilateral;  PRE- PULMONARY INFILTRATES  POST- SAME    CARDIAC CATHETERIZATION N/A 09/02/2022    Procedure: Coronary angiography;  Surgeon: Guero Verde MD;  Location: [facility] CATH INVASIVE LOCATION;  Service: Cardiovascular;  Laterality: N/A;    CARDIAC CATHETERIZATION N/A 09/02/2022    Procedure: Stent LEFTY coronary;  Surgeon: Guero Verde MD;  Location: [facility] CATH INVASIVE LOCATION;  Service: Cardiovascular;  Laterality: N/A;    CATARACT EXTRACTION  2011    CHOLECYSTECTOMY      CHOLECYSTECTOMY WITH INTRAOPERATIVE CHOLANGIOGRAM N/A 11/28/2022    Procedure: CHOLECYSTECTOMY LAPAROSCOPIC INTRAOPERATIVE CHOLANGIOGRAM;  Surgeon: Dani Grover Jr., MD;  Location: [facility] MAIN OR;  Service: General;  Laterality: N/A;    COLONOSCOPY N/A 02/09/2023    Procedure: COLONOSCOPY TO CECUM & T.I.;  Surgeon: Guero Ferris MD;  Location: [facility] ENDOSCOPY;  Service: Gastroenterology;  Laterality: N/A;  PRE- MELENA, GI BLEED  POST- DIVERTICULOSIS, INT HEMORRHOIDS    ENDOSCOPY N/A 01/25/2023    Procedure: ESOPHAGOGASTRODUODENOSCOPY WITH BIOPSIES;  Surgeon: Charles Diaz MD;  Location: [facility] ENDOSCOPY;  Service: Gastroenterology;  Laterality:  N/A;  pre: abd pain, nausea  post: hiatal hernia, mild gastritis, sloughing of the esophagus    ENTEROSCOPY SMALL BOWEL N/A 02/09/2023    Procedure: ENTEROSCOPY SMALL BOWEL;  Surgeon: Guero Ferris MD;  Location: [facility] ENDOSCOPY;  Service: Gastroenterology;  Laterality: N/A;  PRE- MELENA, GI BLEED  POST- HIATAL HERNIA    JOINT REPLACEMENT      UPPER GASTROINTESTINAL ENDOSCOPY        General Information       Row Name 11/28/23 1535          OT Time and Intention    Document Type evaluation  -     Mode of Treatment individual therapy;occupational therapy  -       Row Name 11/28/23 1535          General Information    Patient Profile Reviewed yes  -     Prior Level of Function --  Recently admitted here and dc to rehab and then admitted back to hospital. Reports before her hospitalilzations she was indep at her [facility] and used a rollator for mobility to and from the dining room and within her room  -     Existing Precautions/Restrictions fall  -       Row Name 11/28/23 1535          Living Environment    People in Home --  Admitted from SNF- usually at [facility]  -       Row Name 11/28/23 1535          Home Main Entrance    Number of Stairs, Main Entrance none  -       Row Name 11/28/23 1535          Stairs Within Home, Primary    Number of Stairs, Within Home, Primary none  -       Row Name 11/28/23 1535          Cognition    Orientation Status (Cognition) oriented x 3  -       Row Name 11/28/23 1532          Safety Issues, Functional Mobility    Impairments Affecting Function (Mobility) balance;endurance/activity tolerance;strength  -               User Key  (r) = Recorded By, (t) = Taken By, (c) = Cosigned By      Initials Name Provider Type    Vanda Blanchard OT Occupational Therapist                     Mobility/ADL's       Row Name 11/28/23 1538          Bed Mobility    Bed Mobility supine-sit;sit-supine  -     Supine-Sit [place] (Bed Mobility) minimum assist (75% patient effort);1  person assist  -     Sit-Supine [place] (Bed Mobility) moderate assist (50% patient effort)  Assist with LE's to return to bed  -[facility] Name 11/28/23 1538          Transfers    Transfers sit-stand transfer  -KA       Row Name 11/28/23 1538          Sit-Stand Transfer    Comment, (Sit-Stand Transfer) Attempted STS from EOB and pt unable to clear bottom with bed height elevated and use of rwx and assist from therapist. Reports she needed to be returned to supine in order to change brief.  -[facility] Name 11/28/23 1538          Functional Mobility    Functional Mobility- Ind. Level not tested  -KA       Row Name 11/28/23 1538          Activities of Daily Living    BADL Assessment/Intervention toileting;lower body dressing  -KA       Row Name 11/28/23 1538          Toileting Assessment/Training    [place] Level (Toileting) toileting skills;adjust/manage clothing;change pad/brief;perform perineal hygiene;dependent (less than 25% patient effort)  -     Position (Toileting) supine  -     Comment, (Toileting) unable to perform STS today in order to practice BSC transfer  -KA       Row Name 11/28/23 1538          Lower Body Dressing Assessment/Training    [place] Level (Lower Body Dressing) lower body dressing skills;doff;don;socks;maximum assist (25% patient effort)  -     Comment, (Lower Body Dressing) due to pt feeling dizzy while seated EOB  -               User Key  (r) = Recorded By, (t) = Taken By, (c) = Cosigned By      Initials Name Provider Type    Vanda Blanchard OT Occupational Therapist                   Obj/Interventions       [facility] Name 11/28/23 1540          Range of Motion Comprehensive    General Range of Motion bilateral upper extremity ROM WFL  -KA       Row Name 11/28/23 1540          Strength Comprehensive (MMT)    General Manual Muscle Testing (MMT) Assessment upper extremity strength deficits identified  -KA       Row Name 11/28/23 1540          Balance    Balance  Assessment sitting static balance;sitting dynamic balance  -KA     Static Sitting Balance standby assist  -KA     Dynamic Sitting Balance contact guard  -KA     Balance Interventions sitting;occupation based/functional task  -KA               User Key  (r) = Recorded By, (t) = Taken By, (c) = Cosigned By      Initials Name Provider Type    Vanda Blanchard, OT Occupational Therapist                   Goals/Plan       Row Name 11/28/23 1545          Transfer Goal 1 (OT)    Activity/Assistive Device (Transfer Goal 1, OT) transfers, all  -KA     [place] Level/Cues Needed (Transfer Goal 1, OT) minimum assist (75% or more patient effort)  -KA     Time Frame (Transfer Goal 1, OT) short term goal (STG);2 weeks  -KA     Progress/Outcome (Transfer Goal 1, OT) goal ongoing  -SOPHIE       Row Name 11/28/23 1545          Bathing Goal 1 (OT)    Activity/Device (Bathing Goal 1, OT) bathing skills, all  -KA     [place] Level/Cues Needed (Bathing Goal 1, OT) minimum assist (75% or more patient effort)  -KA     Time Frame (Bathing Goal 1, OT) short term goal (STG);2 weeks  -KA     Progress/Outcomes (Bathing Goal 1, OT) goal ongoing  -SOPHIE       Row Name 11/28/23 1541          Toileting Goal 1 (OT)    Activity/Device (Toileting Goal 1, OT) toileting skills, all  -KA     [place] Level/Cues Needed (Toileting Goal 1, OT) minimum assist (75% or more patient effort)  -KA     Time Frame (Toileting Goal 1, OT) short term goal (STG);2 weeks  -KA     Progress/Outcome (Toileting Goal 1, OT) goal ongoing  -SOPHIE       Row Name 11/28/23 1543          Grooming Goal 1 (OT)    Activity/Device (Grooming Goal 1, OT) grooming skills, all  -KA     [place] (Grooming Goal 1, OT) standby assist  -KA     Time Frame (Grooming Goal 1, OT) short term goal (STG);2 weeks  -KA     Progress/Outcome (Grooming Goal 1, OT) goal ongoing  -SOPHIE       Row Name 11/28/23 1542          Therapy Assessment/Plan (OT)    Planned Therapy Interventions (OT) activity  "tolerance training;BADL retraining;functional balance retraining;patient/caregiver education/training;passive ROM/stretching;occupation/activity based interventions;ROM/therapeutic exercise;strengthening exercise;transfer/mobility retraining  -               User Key  (r) = Recorded By, (t) = Taken By, (c) = Cosigned By      Initials Name Provider Type    Vanda Blanchard, AVINASH Occupational Therapist                   Clinical Impression       Row Name 11/28/23 5458          Pain Assessment    Pretreatment Pain Rating 0/10 - no pain  -     Posttreatment Pain Rating 0/10 - no pain  -       Row Name 11/28/23 6668          Plan of Care Review    Plan of Care Reviewed With patient  -     Outcome Evaluation Pt seen for OT eval this afternoon. Pt admitted with abd pain and N/V from [facility]. Pt with a recent hospitalization and transferred to rehab and then quickly transferred back to hospital. Pt reports she usually lives at an [facility] and is independent with ADLs and uses a rollator for mobility within her room and to dining room. Today pt required min A for bed mobility. Reported dizziness while seated EOB and required dep A to don socks. Pt attempted to stand with therapist assist, bed height elevated and use of rwx, unable to come to complete STS. Pt reported, \" I just can't do it today, I'm really trying\". Pt reported she needed to return to supine for brief change which she required dep A for all toileting today. Pt to benefit from skilled OT to address deficits. Anticipate return to SNF.  -       Row Name 11/28/23 5906          Therapy Assessment/Plan (OT)    Rehab Potential (OT) good, to achieve stated therapy goals  -     Criteria for Skilled Therapeutic Interventions Met (OT) yes  -     Therapy Frequency (OT) 5 times/wk  -       Row Name 11/28/23 0091          Therapy Plan Review/Discharge Plan (OT)    Anticipated Discharge Disposition (OT) skilled nursing facility  -       Row Name " 11/28/23 1540          Vital Signs    Pre Patient Position Supine  -KA     Intra Patient Position Sitting  -KA     Post Patient Position Supine  -KA       Row Name 11/28/23 1540          Positioning and Restraints    Pre-Treatment Position in bed  -KA     Post Treatment Position bed  -KA     In Bed notified nsg;supine;call light within reach;encouraged to call for assist;exit alarm on;with nsg  -SOPHIE               User Key  (r) = Recorded By, (t) = Taken By, (c) = Cosigned By      Initials Name Provider Type    Vanda Blanchard OT Occupational Therapist                   Outcome Measures       Row Name 11/28/23 1546          How much help from another is currently needed...    Putting on and taking off regular lower body clothing? 1  -KA     Bathing (including washing, rinsing, and drying) 2  -KA     Toileting (which includes using toilet bed pan or urinal) 1  -KA     Putting on and taking off regular upper body clothing 2  -KA     Taking care of personal grooming (such as brushing teeth) 3  -KA     Eating meals 3  -KA     AM-PAC 6 Clicks Score (OT) 12  -KA       Row Name 11/28/23 1546          Functional Assessment    Outcome Measure Options AM-PAC 6 Clicks Daily Activity (OT)  -               User Key  (r) = Recorded By, (t) = Taken By, (c) = Cosigned By      Initials Name Provider Type    Vanda Blanchard OT Occupational Therapist                    Occupational Therapy Education       Title: PT OT SLP Therapies (In Progress)       Topic: Occupational Therapy (Done)       Point: ADL training (Done)       Description:   Instruct learner(s) on proper safety adaptation and remediation techniques during self care or transfers.   Instruct in proper use of assistive devices.                  Learning Progress Summary             Patient Acceptance, E, AMANDA,VU by SOPHIE at 11/28/2023 1546                         Point: Home exercise program (Done)       Description:   Instruct learner(s) on appropriate technique for  monitoring, assisting and/or progressing therapeutic exercises/activities.                  Learning Progress Summary             Patient Acceptance, E, DU,VU by SOPHIE at 11/28/2023 1546                         Point: Precautions (Done)       Description:   Instruct learner(s) on prescribed precautions during self-care and functional transfers.                  Learning Progress Summary             Patient Acceptance, E, DU,VU by SOPHIE at 11/28/2023 1546                         Point: Body mechanics (Done)       Description:   Instruct learner(s) on proper positioning and spine alignment during self-care, functional mobility activities and/or exercises.                  Learning Progress Summary             Patient Acceptance, E, DU,VU by SOPHIE at 11/28/2023 1546                                         User Key       Initials Effective Dates Name Provider Type Discipline    SOPHIE 09/22/22 -  Vanda Jaime OT Occupational Therapist OT                  OT Recommendation and Plan  Planned Therapy Interventions (OT): activity tolerance training, BADL retraining, functional balance retraining, patient/caregiver education/training, passive ROM/stretching, occupation/activity based interventions, ROM/therapeutic exercise, strengthening exercise, transfer/mobility retraining  Therapy Frequency (OT): 5 times/wk  Plan of Care Review  Plan of Care Reviewed With: patient  Outcome Evaluation: Pt seen for OT eval this afternoon. Pt admitted with abd pain [facility]. Pt with a recent hospitalization and transferred to rehab and then quickly transferred back to hospital. Pt reports she usually lives at an [facility] and is independent with ADLs and uses a rollator for mobility within her room and to dining room. Today pt required min A for bed mobility. Reported dizziness while seated EOB and required dep A to don socks. Pt attempted to stand with therapist assist, bed height elevated and use of rwx, unable to come to  "complete STS. Pt reported, \" I just can't do it today, I'm really trying\". Pt reported she needed to return to supine for brief change which she required dep A for all toileting today. Pt to benefit from skilled OT to address deficits. Anticipate return to SNF.     Time Calculation:         Time Calculation- OT       Row Name 11/28/23 1546             Time Calculation- OT    OT Start Time 1505  -KA      OT Stop Time 1530  -KA      OT Time Calculation (min) 25 min  -KA      OT Received On 11/28/23  -KA      OT - Next Appointment 11/29/23  -KA      OT Goal Re-Cert Due Date 12/12/23  -KA         Timed Charges    42251 - OT Self Care/Mgmt Minutes 15  -KA         Untimed Charges    OT Eval/Re-eval Minutes 10  -KA         Total Minutes    Timed Charges Total Minutes 15  -KA      Untimed Charges Total Minutes 10  -KA       Total Minutes 25  -KA                User Key  (r) = Recorded By, (t) = Taken By, (c) = Cosigned By      Initials Name Provider Type    Vanda Blanchard OT Occupational Therapist                  Therapy Charges for Today       Code Description Service Date Service Provider Modifiers Qty    75501708567 HC OT SELF CARE/MGMT/TRAIN EA 15 MIN 11/28/2023 Vanda Jaime OT GO 1    15700029787 HC OT EVAL MOD COMPLEXITY 3 11/28/2023 Vanda Jaime OT GO 1                 Vanda Jaime OT  11/28/2023  "

## 2023-11-28 NOTE — CASE MANAGEMENT/SOCIAL WORK
Continued Stay Note  [facility]     Patient Name: Catherine Boyer  MRN: [id number]  Today's Date: 11/28/2023    Admit Date: 11/23/2023    Plan: DC to [facility], will need ambulance/stretcher transportation   Discharge Plan       Row Name 11/28/23 1409       Plan    Plan DC to [facility], will need ambulance/stretcher transportation    Plan Comments Pt spoke with MD, and would like for treatment to progress. Spoke with [facility] and [facility] can accept pt with a PICC line and IVAB. Pt will need pre-cert when ready for discharge. Pt will also need to have transportation arranged.      Row Name 11/28/23 1225       Plan    Plan SNF vs Palliative    Plan Comments Spoke with pt re disposition plans. Pt would like to speak to MD re palliative care/comfort care vs further treatment. Informed [facility] of pts wish.                   Discharge Codes    No documentation.                 Expected Discharge Date and Time       Expected Discharge Date Expected Discharge Time    Nov 30, 2023               Cherry Watts RN

## 2023-11-28 NOTE — PROGRESS NOTES
Access did follow up on pt. Pt was sleeping. Pt's nurse states that pt's anxiety level was improved once she started taking the Atarax. Nurse states pt would like a palliative consult. Family is discussing the option. Access will sign off. Please notify us if pt would like a visit.

## 2023-11-28 NOTE — PROGRESS NOTES
"[facility] Clinical Pharmacy Services: Vancomycin Monitoring Note    Catherine Boyer is a 83 y.o. female who is on day 4/5 of pharmacy to dose vancomycin for Bacteremia.      Previous Vancomycin Dose:  1000mg IV every 24 hours  Updated Cultures and Sensitivities:   11/24: Blood Cx MRSA    Results from last 7 days   Lab Units 11/28/23  0925   VANCOMYCIN TR mcg/mL 15.20     Vitals/Labs  Ht: 157.5 cm (62\"); Wt: 64.8 kg (142 lb 13.7 oz)   Temp Readings from Last 1 Encounters:   11/28/23 97.9 °F (36.6 °C) (Oral)     Estimated Creatinine Clearance: 45.5 mL/min (by C-G formula based on SCr of 0.8 mg/dL).     Results from last 7 days   Lab Units 11/28/23  0505 11/27/23  0950 11/26/23  0338   CREATININE mg/dL 0.80 0.83 0.76   WBC 10*3/mm3 98.09* 98.25* 87.18*     Assessment/Plan    Current Vancomycin Dose: 1000mg IV every 24 hours; provides a predicted  mg/L.hr   Next Level Date and Time: Vanc Trough on 12/2 if duration of therapy extended.  Duration of consult currently ends 11/30.  Awaiting documented sterility of cultures.  We will continue to monitor patient changes and renal function     Thank you for involving pharmacy in this patient's care. Please contact pharmacy with any questions or concerns.       Sara Corona, PharmD  Clinical Pharmacist    "

## 2023-11-28 NOTE — PROGRESS NOTES
LOS: 4 days     Chief Complaint:  MRSA bacteremia     Interval History: Afebrile, no new complaints.  Tolerating antibiotics without abdominal pain vomiting diarrhea or rash    Vital Signs  Temp:  [97.9 °F (36.6 °C)-98.2 °F (36.8 °C)] 97.9 °F (36.6 °C)  Heart Rate:  [] 105  Resp:  [18-20] 20  BP: ()/(46-57) 100/57    Physical Exam:  General: Chronically ill-appearing  Cardiovascular: Tachycardic, no lower extremity edema  Respiratory: breathing comfortably on room air  GI: Soft, NT/ND  Skin: No rashes    Antibiotics:  Vancomycin dosing per pharmacy      Results Review:    Lab Results   Component Value Date    WBC 98.09 (C) 11/28/2023    HGB 7.4 (L) 11/28/2023    HCT 23.0 (L) 11/28/2023    MCV 95.4 11/28/2023     11/28/2023     Lab Results   Component Value Date    GLUCOSE 80 11/28/2023    BUN 18 11/28/2023    CREATININE 0.80 11/28/2023    EGFRIFNONA 71 07/09/2021    EGFRIFAFRI >60 10/06/2022    BCR 22.5 11/28/2023    CO2 24.0 11/28/2023    CALCIUM 8.7 11/28/2023    PROTENTOTREF 7.0 03/21/2023    ALBUMIN 3.5 11/25/2023    LABIL2 1.4 03/21/2023    AST 15 11/25/2023    ALT 18 11/25/2023     Procalcitonin 0.25 -> 0.14     Microbiology:  11/26 BCx NGTD x 2  11/24 BCx MRSA 1/2 11/24 RVP neg     11/8 BAL cx neg    Echocardiogram with a severe thickening of the noncoronary cusp of the aortic valve concerning for possible vegetation.    Assessment & Plan   MRSA bacteremia  Myelofibrosis on [place] complicating above  Chronic leukocytosis due to above  Bilateral pulmonary infiltrates    Discussed with Dr. Fink. TTE findings of an abnormal aortic valve are chronic and were present back in 2022. Pt remains afebrile. Repeat blood cx are NGTD. Given no clear source of the MRSA bacteremia and the patient's comorbidities I recommend a 4 week course of IV vancomycin    Final abx recommendations:  Vancomycin dosing pwer pharmacy x 4 weeks. Abx stop date 12/23  Please monitor weekly cbc/diff, CMP, and vanc  troughs and fax the results to [phone]  Will arrange for ID follow up on 1/3 for repeat blood cx     ID will sign off.  Please do not hesitate to call us with further questions or concerns

## 2023-11-28 NOTE — NURSING NOTE
IV team consulted to place a PICC line for 4 weeks of IV abx. Expected discharge date is 11/30. Spoke with primary RN about placing line - she states she will work on getting the consent, behavior contract, and education form completed. IV team will continue to follow and will place PICC line when appropriate.

## 2023-11-28 NOTE — PROGRESS NOTES
Subjective     HISTORY OF PRESENT ILLNESS:   Patient feeling about the same.  She has not nauseous or vomiting but she reports abdominal cramping and stooling after eating minimal amount?    Past Medical History, Past Surgical History, Social History, Family History have been reviewed and are without significant changes except as mentioned.    Review of Systems   A comprehensive 14 point review of systems was performed and was negative except as mentioned.    Medications:  The current medication list was reviewed in the EMR    ALLERGIES:    Allergies   Allergen Reactions    Aspirin Unknown - Low Severity    Diphenhydramine Hcl Anxiety     Benadryl IVP    Hydroxyurea Other (See Comments)     Her hemoglobin drop and was stop in February 2022 and start taking Jakafi    Oxycodone Unknown - Low Severity       Objective      Vitals:    11/28/23 0730 11/28/23 0817 11/28/23 1129 11/28/23 1301   BP: 100/57   125/53   BP Location: Left arm   Left arm   Patient Position: Lying   Lying   Pulse: 96 105 101 101   Resp: 18 20  18   Temp:    97.9 °F (36.6 °C)   TempSrc:    Oral   SpO2: (!) 84% 95% 94% 96%   Weight:       Height:             10/18/2023     4:02 PM   Current Status   ECOG score 2       Physical Exam    CONSTITUTIONAL:  Vital signs reviewed.  Elderly lady lying in bed no acute distress  EYES:  Conjunctiva and lids unremarkable.    EARS,NOSE,MOUTH,THROAT:  Ears and nose appear unremarkable.    RESPIRATORY:  Normal respiratory effort.  O2 by nasal cannula in place  CARDIOVASCULAR:  Normal heart rate  GASTROINTESTINAL: Abdomen appears unremarkable.  Nondistended.  Splenomegaly   LYMPHATIC:  No cervical, supraclavicular lymphadenopathy.  SKIN:  Warm.  No rashes.  PSYCHIATRIC:  Normal judgment and insight.  Normal mood and affect.  NEURO: AAOx3, no obvious focal deficits.  Exam is unchanged-11/28/2023    RECENT LABS:  Hematology WBC   Date Value Ref Range Status   11/28/2023 98.09 (C) 3.40 - 10.80 10*3/mm3 Final     RBC    Date Value Ref Range Status   11/28/2023 2.41 (L) 3.77 - 5.28 10*6/mm3 Final     Hemoglobin   Date Value Ref Range Status   11/28/2023 7.4 (L) 12.0 - 15.9 g/dL Final     Hematocrit   Date Value Ref Range Status   11/28/2023 23.0 (L) 34.0 - 46.6 % Final     Platelets   Date Value Ref Range Status   11/28/2023 268 140 - 450 10*3/mm3 Final              Assessment & Plan   Patient is a pleasant 83-year-old female with medical history significant for JAK2 positive myeloproliferative disorder, perhaps primary myelofibrosis, A-fib on chronic Eliquis, CAD with stents, history of endocarditis and TIA admitted with nausea and vomiting.       #Nausea and vomiting due to suspected sepsis and pneumonia related:  Patient had presented to [facility] ER on 11/23/2023 with nausea and vomiting.  Patient was discharged just a day before after prolonged hospitalization for ESBL UTI, acute renal failure and respiratory distress.  Patient states that she initially reached the rehab facility, she started to feel bad.  She developed nausea and vomiting and this was brought back to the hospital.  Work-up in the ER with CT scans did not show any acute abnormalities.  Persistent bilateral pulmonary opacities, concerning for multifocal pneumonia and/or aspiration.  Elevated lactic acid level of 5.2  11/26: Continues to have N/V.  Started on IV Protonix daily.  Encouraged to ask for Zofran.  Lactate improved to 4.8.  Continue IV fluids and antibiotics as per primary. 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  This is likely primary myelofibrosis +/-steroids and infection related  White count increasing-98.2 WBC with hemoglobin 7.5 since holding low momelotinib     #Leukocytosis: As above    #MRSA bacteremia:  ID managing, on  vancomycin    #Liver cirrhosis and splenomegaly     #Chronic hypoxic respiratory failure:  She was evaluated with bronchoscopy with biopsies on 11/8/2023.  No evidence of malignancy.  Continue with gradual steroids taper-per admitting/pulmonary medicine  Antibiotics as above     #History of A-fib on Eliquis     Recommendations:  -Restart Ojjara given increasing leukocytosis/worsening anemia-monitor for recurrence of nausea/vomiting  -Daily CBC with differential

## 2023-11-28 NOTE — PLAN OF CARE
"Goal Outcome Evaluation:  Plan of Care Reviewed With: patient           Outcome Evaluation: Pt seen for OT eval this afternoon. [facility] Pt with a recent hospitalization and transferred to rehab and then quickly transferred back to hospital. Pt reports she usually lives at an [facility] and is independent with ADLs and uses a rollator for mobility within her room and to dining room. Today pt required min A for bed mobility. Reported dizziness while seated EOB and required dep A to don socks. Pt attempted to stand with therapist assist, bed height elevated and use of rwx, unable to come to complete STS. Pt reported, \" I just can't do it today, I'm really trying\". Pt reported she needed to return to supine for brief change which she required dep A for all toileting today. Pt to benefit from skilled OT to address deficits. Anticipate return to SNF.      Anticipated Discharge Disposition (OT): skilled nursing facility  "

## 2023-11-29 LAB
FUNGUS WND CULT: NORMAL
MYCOBACTERIUM SPEC CULT: NORMAL
NIGHT BLUE STAIN TISS: NORMAL

## 2023-11-29 NOTE — PROGRESS NOTES
LOS: 5 days   Patient Care Team:  Brett Jackson MD as PCP - General (Internal Medicine)  Gerard Foreman MD as Referring Physician (Medical Oncology)  Darrell Reinoso MD as Consulting Physician (Hematology and Oncology)  Albin Barriga MD as Cardiologist (Cardiology)  Richard Aldana, RN as     Chief Complaint:     Chest pain    Interval History:     Called this am about pt having chest pain-she describes a heaviness across anteprecordium which was relieved with nitroglycerin.  It radiated into her back and left arm and into her jaw.  It feels similar to what she had with her prior coronary artery disease requiring stent.      Objective   Vital Signs  Temp:  [97.3 °F (36.3 °C)-97.9 °F (36.6 °C)] 97.3 °F (36.3 °C)  Heart Rate:  [100-120] 100  Resp:  [18-20] 18  BP: ()/(48-78) 117/51    Intake/Output Summary (Last 24 hours) at 11/29/2023 0836  Last data filed at 11/29/2023 0330  Gross per 24 hour   Intake --   Output 450 ml   Net -450 ml       Comfortable NAD  Neck supple, no JVD or thyromegaly appreciated  S1/S2 RRR, no m/r/g  Lungs CTA B, normal effort  Abdomen S/NT/ND (+) BS, no HSM appreciated  Extremities warm, no clubbing, cyanosis, or edema  Lesions on toes.  A/Ox4, mood and affect appropriate    Results Review:      Results from last 7 days   Lab Units 11/29/23  0427 11/28/23  0505 11/27/23  0950   SODIUM mmol/L 134* 135* 135*   POTASSIUM mmol/L 4.7 4.7 4.0   CHLORIDE mmol/L 96* 97* 95*   CO2 mmol/L 22.0 24.0 26.5   BUN mg/dL 20 18 15   CREATININE mg/dL 0.80 0.80 0.83   GLUCOSE mg/dL 69 80 69   CALCIUM mg/dL 8.0* 8.7 8.4*     Results from last 7 days   Lab Units 11/29/23  0745 11/29/23  0427 11/26/23  1422   HSTROP T ng/L 32* 27* 22*     Results from last 7 days   Lab Units 11/29/23  0427 11/28/23  0505 11/27/23  0950   WBC 10*3/mm3 97.19* 98.09* 98.25*   HEMOGLOBIN g/dL 7.1* 7.4* 7.5*   HEMATOCRIT % 22.9* 23.0* 24.4*   PLATELETS 10*3/mm3 273 268 300             Results from last 7 days    Lab Units 11/25/23  0246   MAGNESIUM mg/dL 1.9           I reviewed the patient's new clinical results.  I personally viewed and interpreted the patient's EKG/Telemetry data        Medication Review:   acetaminophen, 650 mg, Oral, Once   Or  acetaminophen, 650 mg, Oral, Once   Or  acetaminophen, 650 mg, Rectal, Once  apixaban, 5 mg, Oral, BID  aspirin, 81 mg, Oral, Daily  Eucerin original healing lotion, , Topical, Q12H  hydrocortisone sodium succinate, 50 mg, Intravenous, Once  lactated ringers, 1,000 mL, Intravenous, Once  Menthol-Zinc Oxide, 1 application , Topical, Q12H  metoprolol succinate XL, 25 mg, Oral, Daily  Momelotinib Dihydrochloride, 200 mg, Oral, Q24H  pantoprazole, 40 mg, Oral, Q AM  predniSONE, 15 mg, Oral, Daily With Breakfast   Followed by  [START ON 12/6/2023] predniSONE, 10 mg, Oral, Daily With Breakfast   Followed by  [START ON 12/13/2023] predniSONE, 5 mg, Oral, Daily With Breakfast  senna-docusate sodium, 2 tablet, Oral, BID  sertraline, 100 mg, Oral, Daily  sodium chloride, 250 mL, Intravenous, Once  tiotropium bromide monohydrate, 2 puff, Inhalation, Daily - RT  vancomycin, 1,000 mg, Intravenous, Q24H        Pharmacy to dose vancomycin,         Assessment & Plan       MRSA bacteremia    CAD (coronary artery disease)    Paroxysmal atrial fibrillation    Myeloproliferative disorder    Type 2 diabetes mellitus with circulatory disorder, without long-term current use of insulin    CAP (community acquired pneumonia)    Community acquired pneumonia on antibiotics  MRSA bacteremia - will get 4 weeks of antibiotics.   Metabolic acidosis  Myeloproliferative disorder with anemia, worsening  History of marantic vegetations  - c/w echo 9/2022, there is no change.  Diabetes mellitus type 2  Paroxysmal atrial fibrillation, on Eliquis.  Heart rate is controlled on metoprolol.  CAD with diagonal stent for in-stent restenosis 9/2022    Unstable anginal symptoms this morning-pain relieved with  nitroglycerin but then she became hypotensive and required more oxygen.  Her chest x-ray shows infiltrates but also probable pulmonary edema.  Plan for cardiac catheterization today-spoke with patient and family about this with risks and benefits explained. Hold Eliquis-remain on aspirin.      Her anemia is worse-is to get transfused today.  Heart rate is little higher but her blood pressure was borderline.      Spoke with Dr. Case, Dr. De La Paz-spoke with daughter at bedside.    Marimar Fink MD  11/29/23  08:36 EST

## 2023-11-29 NOTE — CONSULTS
Patient Care Team:  Brett Jackson MD as PCP - General (Internal Medicine)  Gerard Foreman MD as Referring Physician (Medical Oncology)  Darrell Reinoso MD as Consulting Physician (Hematology and Oncology)  Albin Barriga MD as Cardiologist (Cardiology)  Richard Aldana, RN as       Subjective     Patient is a 83 y.o. female.  Asked to see regarding respiratory failure question BiPAP.  Patient was admitted with a diagnosis of pneumonia this morning she had some chest pressure and shortness of breath with worsening hypoxia.  On admission she had a CT scan which showed some perihilar edema/infiltrates and x-ray this morning shows worsening perihilar type edema.  She says now that her chest pain is gone her breathing is better course they have turned her up to 12 L O2.  She is a little claustrophobic and not sure she could wear a tight fitting mask.  She is a DNR/DNI but otherwise wants aggressive care.  He has known coronary artery disease with recent stent, chronic heart failure preserved ejection fraction, myeloproliferative order that is JAK2 positive she has had a history of a marantic mitral valve vegetation and she is stiff with MRSA bacteremia.  Patient is rather hard of hearing but pleasant have to yell at her to get her to hear she also recently had a lung nodule less left lingular pleural-based density that was biopsied and she had postop pneumothorax, there was some inflammatory cells some proteinaceous material but no malignant cells seen.      Review of Systems:  No cough or minimal cough no sputum no fevers chills or sweats today      History  Past Medical History:   Diagnosis Date    Atherosclerosis of abdominal aorta 02/05/2023    Atrial fibrillation     Breast cancer     CAD (coronary artery disease)     NSTEMI 2/2022: 90% ostial LAD, 99% D1, 70% mid-distal LAD (medical therapy). She received two stents (2.5x18 and 2.5x26mm [place] LEFTY) but I don't know which one went to which  lesion.    Carotid atherosclerosis     Cholecystitis 11/22/2022    Added automatically from request for surgery [id number]    Chronic diastolic (congestive) heart failure     COVID 10/29/2022    GERD (gastroesophageal reflux disease)     Glaucoma     History of cataract     Hypertension     Microcytic anemia     per Dr. oleg pate office  note 6/30/22-dd    Myeloproliferative disorder     JAK2 positive    Nonbacterial thrombotic endocarditis     6/2021: 4x5mm vegetation on the ventricular surface of the anterior MV, negative blood cultures    Porcelain gallbladder     PUD (peptic ulcer disease)     TIA (transient ischemic attack)     Type 2 diabetes mellitus     Type 2 diabetes mellitus with circulatory disorder, without long-term current use of insulin 07/14/2022    Upper GI bleed      Past Surgical History:   Procedure Laterality Date    BREAST LUMPECTOMY  1999    BRONCHOSCOPY Bilateral 11/8/2023    Procedure: BRONCHOSCOPY UNDER FLUORO WITH BAL,  BIOPSIES; ACETYLCYSTEIN 4ML GIVEN;  Surgeon: Raoul Alford MD;  Location: [facility] ENDOSCOPY;  Service: Pulmonary;  Laterality: Bilateral;  PRE- PULMONARY INFILTRATES  POST- SAME    CARDIAC CATHETERIZATION N/A 09/02/2022    Procedure: Coronary angiography;  Surgeon: Guero Verde MD;  Location: [facility] CATH INVASIVE LOCATION;  Service: Cardiovascular;  Laterality: N/A;    CARDIAC CATHETERIZATION N/A 09/02/2022    Procedure: Stent LEFTY coronary;  Surgeon: Guero Verde MD;  Location: [facility] CATH INVASIVE LOCATION;  Service: Cardiovascular;  Laterality: N/A;    CATARACT EXTRACTION  2011    CHOLECYSTECTOMY      CHOLECYSTECTOMY WITH INTRAOPERATIVE CHOLANGIOGRAM N/A 11/28/2022    Procedure: CHOLECYSTECTOMY LAPAROSCOPIC INTRAOPERATIVE CHOLANGIOGRAM;  Surgeon: Dani Grover Jr., MD;  Location: [facility] MAIN OR;  Service: General;  Laterality: N/A;    COLONOSCOPY N/A 02/09/2023    Procedure: COLONOSCOPY TO CECUM & T.I.;  Surgeon: Guero Ferris MD;  Location: [facility]  "ENDOSCOPY;  Service: Gastroenterology;  Laterality: N/A;  PRE- MELENA, GI BLEED  POST- DIVERTICULOSIS, INT HEMORRHOIDS    ENDOSCOPY N/A 2023    Procedure: ESOPHAGOGASTRODUODENOSCOPY WITH BIOPSIES;  Surgeon: Charles Diaz MD;  Location:  [facility] ENDOSCOPY;  Service: Gastroenterology;  Laterality: N/A;  pre: abd pain, nausea  post: hiatal hernia, mild gastritis, sloughing of the esophagus    ENTEROSCOPY SMALL BOWEL N/A 2023    Procedure: ENTEROSCOPY SMALL BOWEL;  Surgeon: Guero Ferris MD;  Location: [facility] ENDOSCOPY;  Service: Gastroenterology;  Laterality: N/A;  PRE- MELENA, GI BLEED  POST- HIATAL HERNIA    JOINT REPLACEMENT      UPPER GASTROINTESTINAL ENDOSCOPY       Social History     Socioeconomic History    Marital status:    Tobacco Use    Smoking status: Former     Packs/day: 1.00     Years: 20.00     Additional pack years: 0.00     Total pack years: 20.00     Types: Cigarettes     Quit date: 1950     Years since quittin.0     Passive exposure: Past    Smokeless tobacco: Never    Tobacco comments:     30  years ago   Vaping Use    Vaping Use: Never used   Substance and Sexual Activity    Alcohol use: Not Currently     Comment: \"rare\"    Drug use: Never    Sexual activity: Defer     Family History   Problem Relation Age of Onset    Ovarian cancer Mother     Lung cancer Father     Lung cancer Sister     Malig Hyperthermia Neg Hx          Allergies:  Aspirin, Diphenhydramine hcl, Hydroxyurea, and Oxycodone    Medications:  Prior to Admission medications    Medication Sig Start Date End Date Taking? Authorizing Provider   acetaminophen (TYLENOL) 500 MG tablet Take 1 tablet by mouth As Needed.   Yes Provider, MD Marya   apixaban (ELIQUIS) 5 MG tablet tablet Take 1 tablet by mouth 2 (Two) Times a Day. 23  Yes Estrellita Blanco APRN   aspirin 81 MG EC tablet Take 1 tablet by mouth Daily. 23  Yes Eduard Rees MD   bisacodyl (DULCOLAX) 10 MG suppository Insert 1 " suppository into the rectum Daily As Needed for Constipation (Use if bisacodyl oral is ineffective). 11/22/23  Yes Eduard Rees MD   budesonide-formoterol (SYMBICORT) 160-4.5 MCG/ACT inhaler Inhale 2 puffs 2 (Two) Times a Day. 11/22/23  Yes Eduard Rees MD   cadexomer iodine (IODOSORB) 0.9 % gel Apply 1 application  topically to the appropriate area as directed Daily. 11/23/23  Yes Eduard Rees MD   famotidine (PEPCID) 20 MG tablet Take 1 tablet by mouth Daily. 5/16/23  Yes Kenton Lopez MD   furosemide (LASIX) 20 MG tablet Take 0.5 tablets by mouth Daily. 11/23/23  Yes Eduard Rees MD   latanoprost (XALATAN) 0.005 % ophthalmic solution Administer 1 drop to both eyes. 3/14/22  Yes ProviderMarya MD   metoprolol succinate XL (TOPROL-XL) 25 MG 24 hr tablet Take 1 tablet by mouth Daily. 10/18/23  Yes Estrellita Blanco APRN   Momelotinib Dihydrochloride (OJJAARA) 200 MG tablet Take 1 tablet by mouth Daily. 10/11/23  Yes Darrell Reinoso MD   polyethylene glycol (MIRALAX) 17 g packet Take 17 g by mouth Daily As Needed (Use if senna-docusate is ineffective). 11/22/23  Yes Eduard Rees MD   predniSONE (DELTASONE) 5 MG tablet Take 4 tablets by mouth Daily With Breakfast for 6 days, THEN 3 tablets Daily With Breakfast for 7 days, THEN 2 tablets Daily With Breakfast for 7 days, THEN 1 tablet Daily With Breakfast for 7 days. 11/23/23 12/19/23 Yes Eduard Rees MD   saccharomyces boulardii (FLORASTOR) 250 MG capsule Take 1 capsule by mouth 2 (Two) Times a Day. 5/12/23  Yes Kenton Meeks MD   sertraline (ZOLOFT) 100 MG tablet Take 1 tablet by mouth Daily. 4/5/22  Yes ProviderMarya MD   simethicone (MYLICON) 80 MG chewable tablet Chew 1 tablet by mouth 4 (Four) Times a Day As Needed for Flatulence. 2/11/23  Yes Anthony Muñoz MD   tiotropium bromide monohydrate (SPIRIVA RESPIMAT) 2.5 MCG/ACT aerosol solution inhaler Inhale 2 puffs Daily. 11/23/23  Yes  "Eduard Rees MD     acetaminophen, 650 mg, Oral, Once   Or  acetaminophen, 650 mg, Oral, Once   Or  acetaminophen, 650 mg, Rectal, Once  apixaban, 5 mg, Oral, BID  aspirin, 81 mg, Oral, Daily  Eucerin original healing lotion, , Topical, Q12H  hydrocortisone sodium succinate, 50 mg, Intravenous, Once  lactated ringers, 1,000 mL, Intravenous, Once  Menthol-Zinc Oxide, 1 application , Topical, Q12H  metoprolol succinate XL, 25 mg, Oral, Daily  Momelotinib Dihydrochloride, 200 mg, Oral, Q24H  pantoprazole, 40 mg, Oral, Q AM  predniSONE, 15 mg, Oral, Daily With Breakfast   Followed by  [START ON 12/6/2023] predniSONE, 10 mg, Oral, Daily With Breakfast   Followed by  [START ON 12/13/2023] predniSONE, 5 mg, Oral, Daily With Breakfast  senna-docusate sodium, 2 tablet, Oral, BID  sertraline, 100 mg, Oral, Daily  sodium chloride, 250 mL, Intravenous, Once  tiotropium bromide monohydrate, 2 puff, Inhalation, Daily - RT  vancomycin, 1,000 mg, Intravenous, Q24H      Pharmacy to dose vancomycin,         Objective     Vital Signs  Vital Sign Min/Max for last 24 hours  Temp  Min: 97.3 °F (36.3 °C)  Max: 97.9 °F (36.6 °C)   BP  Min: 77/48  Max: 125/53   Pulse  Min: 100  Max: 120   Resp  Min: 18  Max: 20   SpO2  Min: 79 %  Max: 96 %   Flow (L/min)  Min: 5  Max: 15   Weight  Min: 64 kg (141 lb)  Max: 64 kg (141 lb)       Intake/Output Summary (Last 24 hours) at 11/29/2023 1103  Last data filed at 11/29/2023 1052  Gross per 24 hour   Intake --   Output 650 ml   Net -650 ml     I/O this shift:  In: -   Out: 200 [Urine:200]  Last Weight and Admission Weight        11/29/23  0515   Weight: 64 kg (141 lb)     Flowsheet Rows      Flowsheet Row First Filed Value   Admission Height 157.5 cm (62\") Documented at 11/23/2023 2312   Admission Weight 63.5 kg (140 lb) Documented at 11/23/2023 2312            Body mass index is 25.79 kg/m².           Physical Exam:  General Appearance: Well-developed elderly white female she is resting in bed " she is currently on 12 L O2 with oxygen saturation 96% and she does not look in acute distress  Eyes: Conjunctiva are clear and anicteric  ENT: Mucous membranes are moist no exudates  Neck: No jugular venous distention or hepatojugular reflux, trachea midline  Lungs: Bibasilar rales no dullness  Cardiac: Regular rate rhythm no murmur  Abdomen: Soft no hepatosplenomegaly  : Not examined  Musculoskeletal: Grossly normal  Skin: Warm and dry no jaundice no petechiae  Neuro: Alert and oriented cooperative following commands grossly intact just hard of hearing  Extremities/P Vascular: No clubbing no cyanosis no real edema palpable radial and dorsalis pedis pulses  MSE: Pleasant      Labs:  Results from last 7 days   Lab Units 11/29/23  0427 11/28/23  0505 11/27/23  0950 11/26/23  2325 11/26/23  0338 11/25/23  0246 11/24/23  0736 11/24/23  0002   GLUCOSE mg/dL 69 80 69  --  65 116* 133* 210*   SODIUM mmol/L 134* 135* 135*  --  139 140 139 135*   POTASSIUM mmol/L 4.7 4.7 4.0 4.2 2.7* 3.3* 4.1 4.4   MAGNESIUM mg/dL  --   --   --   --   --  1.9  --  1.7   CO2 mmol/L 22.0 24.0 26.5  --  27.1 29.0 27.2 26.2   CHLORIDE mmol/L 96* 97* 95*  --  97* 95* 89* 93*   ANION GAP mmol/L 16.0* 14.0 13.5  --  14.9 16.0* 22.8* 15.8*   CREATININE mg/dL 0.80 0.80 0.83  --  0.76 0.86 0.91 0.87   BUN mg/dL 20 18 15  --  15 22 23 28*   BUN / CREAT RATIO  25.0 22.5 18.1  --  19.7 25.6* 25.3* 32.2*   CALCIUM mg/dL 8.0* 8.7 8.4*  --  8.3* 8.0* 9.2 8.7   ALK PHOS U/L  --   --   --   --   --  190* 265* 209*   TOTAL PROTEIN g/dL  --   --   --   --   --  5.6* 6.7 6.2   ALT (SGPT) U/L  --   --   --   --   --  18 24 24   AST (SGOT) U/L  --   --   --   --   --  15 20 28   BILIRUBIN mg/dL  --   --   --   --   --  0.8 1.1 1.0   ALBUMIN g/dL  --   --   --   --   --  3.5 4.0 3.7   GLOBULIN gm/dL  --   --   --   --   --  2.1 2.7 2.5     Estimated Creatinine Clearance: 45.2 mL/min (by C-G formula based on SCr of 0.8 mg/dL).      Results from last 7 days   Lab  Units 11/29/23  0427 11/28/23  0505 11/27/23  0950 11/26/23  0338 11/25/23  0246 11/24/23  0736 11/24/23  0002   WBC 10*3/mm3 97.19* 98.09* 98.25* 87.18* 84.33* 83.18* 63.14*   RBC 10*6/mm3 2.39* 2.41* 2.66* 2.67* 2.84* 3.40* 3.13*   HEMOGLOBIN g/dL 7.1* 7.4* 7.5* 8.1* 8.5* 10.1* 9.4*   HEMATOCRIT % 22.9* 23.0* 24.4* 24.9* 26.3* 31.4* 29.3*   MCV fL 95.8 95.4 91.7 93.3 92.6 92.4 93.6   MCH pg 29.7 30.7 28.2 30.3 29.9 29.7 30.0   MCHC g/dL 31.0* 32.2 30.7* 32.5 32.3 32.2 32.1   RDW % 20.8* 20.0* 19.7* 19.7* 19.9* 19.3* 19.4*   RDW-SD fl 70.4* 67.7* 64.5* 67.2* 67.2* 63.2* 64.6*   MPV fL 12.5* 12.0 11.8 11.8 11.9 12.6* 12.7*   PLATELETS 10*3/mm3 273 268 300 291 293 298 232   NEUTROS ABS 10*3/mm3 71.92* 87.30*  --   --   --   --  53.67*   EOS ABS 10*3/mm3  --   --   --   --   --   --  0.63*   NRBC /100 WBC 1.0*  0.4*  --   --   --   --   --   --      Results from last 7 days   Lab Units 11/29/23  0845   PH, ARTERIAL pH units 7.463*   PO2 ART mm Hg 51.8*   PCO2, ARTERIAL mm Hg 35.7   HCO3 ART mmol/L 25.6     Results from last 7 days   Lab Units 11/29/23  0745 11/29/23 0427 11/26/23  1422   HSTROP T ng/L 32* 27* 22*     Results from last 7 days   Lab Units 11/24/23  0002   PROBNP pg/mL 3,717.0*         Results from last 7 days   Lab Units 11/25/23  0246 11/24/23  1922 11/24/23  1315 11/24/23  1042 11/24/23  0756 11/24/23  0241 11/24/23  0002   LACTATE mmol/L 4.8* 6.0* 4.2* 5.2* 5.6* 5.2*  --    PROCALCITONIN ng/mL 0.14  --   --   --   --   --  0.25         Microbiology Results (last 10 days)       Procedure Component Value - Date/Time    Blood Culture - Blood, Arm, Left [[id number]]  (Normal) Collected: 11/26/23 1309    Lab Status: Preliminary result Specimen: Blood from Arm, Left Updated: 11/28/23 1330     Blood Culture No growth at 2 days    Blood Culture - Blood, Arm, Left [[id number]]  (Normal) Collected: 11/26/23 1222    Lab Status: Preliminary result Specimen: Blood from Arm, Left Updated: 11/28/23 1245     Blood  Culture No growth at 2 days    Blood Culture - Blood, Arm, Left [[id number]]  (Abnormal)  (Susceptibility) Collected: 11/24/23 0241    Lab Status: Final result Specimen: Blood from Arm, Left Updated: 11/27/23 0620     Blood Culture Staphylococcus aureus, MRSA     Comment:   Infectious disease consultation is highly recommended to rule out distant foci of infection.  Methicillin resistant Staphylococcus aureus, Patient may be an isolation risk.        Isolated from Aerobic Bottle     Gram Stain Aerobic Bottle Gram positive cocci in clusters    Narrative:      Less than seven (7) mL's of blood was collected.  Insufficient quantity may yield false negative results.    Susceptibility        Staphylococcus aureus, MRSA      BIJAN      Gentamicin Susceptible      Oxacillin Resistant      Rifampin Susceptible      Vancomycin Susceptible                       Susceptibility Comments       Staphylococcus aureus, MRSA    This isolate is presumed to be clindamycin resistant based on detection of inducible clindamycin resistance.  Clindamycin may still be effective in some patients.               Blood Culture ID, PCR - Blood, Arm, Left [[id number]]  (Abnormal) Collected: 11/24/23 0241    Lab Status: Final result Specimen: Blood from Arm, Left Updated: 11/25/23 0447     BCID, PCR Staph aureus. mecA/C and MREJ (methicillin resistance gene) detected. Identification by BCID2 PCR.     BOTTLE TYPE Aerobic Bottle    Narrative:      Infectious disease consultation is highly recommended to rule out distant foci of infection.    Blood Culture - Blood, Hand, Right [[id number]]  (Normal) Collected: 11/24/23 0201    Lab Status: Final result Specimen: Blood from Hand, Right Updated: 11/29/23 0226     Blood Culture No growth at 5 days    Narrative:      Less than seven (7) mL's of blood was collected.  Insufficient quantity may yield false negative results.    Respiratory Panel PCR w/COVID-19(SARS-CoV-2) [facility] In-House, NP Swab  in UTM/VTM, 2 HR TAT - Swab, Nasopharynx [[id number]]  (Normal) Collected: 11/24/23 0201    Lab Status: Final result Specimen: Swab from Nasopharynx Updated: 11/24/23 0400     ADENOVIRUS, PCR Not Detected     Coronavirus 229E Not Detected     Coronavirus HKU1 Not Detected     Coronavirus NL63 Not Detected     Coronavirus OC43 Not Detected     COVID19 Not Detected     Human Metapneumovirus Not Detected     Human Rhinovirus/Enterovirus Not Detected     Influenza A PCR Not Detected     Influenza B PCR Not Detected     Parainfluenza Virus 1 Not Detected     Parainfluenza Virus 2 Not Detected     Parainfluenza Virus 3 Not Detected     Parainfluenza Virus 4 Not Detected     RSV, PCR Not Detected     Bordetella pertussis pcr Not Detected     Bordetella parapertussis PCR Not Detected     Chlamydophila pneumoniae PCR Not Detected     Mycoplasma pneumo by PCR Not Detected    Narrative:      In the setting of a positive respiratory panel with a viral infection PLUS a negative procalcitonin without other underlying concern for bacterial infection, consider observing off antibiotics or discontinuation of antibiotics and continue supportive care. If the respiratory panel is positive for atypical bacterial infection (Bordetella pertussis, Chlamydophila pneumoniae, or Mycoplasma pneumoniae), consider antibiotic de-escalation to target atypical bacterial infection.              Diagnostics:  XR Chest 1 View    Result Date: 11/29/2023  PORTABLE CHEST X-RAY  HISTORY: Chest pain.  TECHNIQUE: Portable chest x-ray is provided and correlated with recent prior exams.  FINDINGS: The cardiac silhouette appears enlarged but unchanged. Fairly dense abnormal parenchymal opacity in the lungs bilaterally has a symmetrical, perihilar distribution. This appears more dense and more extensive than on CT angiogram chest 11/24/2023 or chest x-ray 11/20/2023. No pneumothorax.      Interval worsening in pulmonary parenchymal opacity in the perihilar  distribution which may represent progressive pulmonary edema or pneumonia.  This report was finalized on 11/29/2023 9:20 AM by Dr. Charles Tirado M.D on Workstation: [id number]      Adult Transthoracic Echo Complete W/ Cont if Necessary Per Protocol    Result Date: 11/27/2023    Left ventricular systolic function is normal. Calculated left ventricular EF = 55.3% Normal left ventricular cavity size noted. Left ventricular wall thickness is consistent with moderate concentric hypertrophy. All left ventricular wall segments contract normally. Left ventricular diastolic function is consistent with (grade II w/high LAP) pseudonormalization.   The left atrial cavity is moderately dilated. Left atrial volume is underestimated  The right atrial cavity is moderately dilated.   The aortic valve is abnormal in structure. There is severe thickening of the non-coronary cusp(s) of the aortic valve. There is a mobile undulating mass on what appears to be the ventricular surface of the aortic valve though a clear attachment is not visualized. It is highly suspicious for vegetation. Cannot determine if this is a bileaflet or trileaflet valve Trace aortic valve regurgitation is present. Mild aortic valve stenosis is present.   There is moderate, bileaflet mitral valve thickening present. Mild to moderate mitral valve regurgitation is present. Mild mitral valve stenosis is present. The mean mitral valve gradient is 5 mm Hg at a heart rate of 97 bpm   Trace tricuspid valve regurgitation is present. Estimated right ventricular systolic pressure from tricuspid regurgitation is moderately elevated (45-55 mmHg). Calculated right ventricular systolic pressure from tricuspid regurgitation is 45 mmHg.   Discussed with patient's nurse on floor who will contact primary team for cardiology consult     CT Angiogram Abdomen Pelvis    Result Date: 11/24/2023  Patient: MARIBELL RUGGIERO  Time Out: 01:24 Exam(s): CTA ABDOMEN + PELVIS EXAM:   CT  Angiography Abdomen and Pelvis With Intravenous Contrast CLINICAL HISTORY:      Concern for acute aortic syndrome. TECHNIQUE:   Axial computed tomographic angiography images of the abdomen and pelvis with intravenous contrast.  CTDI is 37 mGy and DLP is 934 mGy-cm.  This CT exam was performed according to the principle of ALARA (As Low As Reasonably Achievable) by using one or more of the following dose reduction techniques: automated exposure control, adjustment of the mA and or kV according to patient size, and or use of iterative reconstruction technique.   MIP reconstructed images were created and reviewed. COMPARISON:   No relevant prior studies available. FINDINGS:  VASCULATURE:   Aorta:  No acute findings.  No aortic aneurysm or dissection.   Celiac trunk and mesenteric arteries:  Mild atherosclerosis of the origin of the celiac trunk, SMA and BETZY without significant stenosis.   Renal arteries:  No acute findings.  No occlusion or significant stenosis.   Iliac arteries:  Moderate atherosclerosis of the bilateral common iliac arteries, external iliac arteries and internal iliac arteries.  No occlusion or significant stenosis.   Lung bases:  Unremarkable.  No mass.  No consolidation.  ABDOMEN:   Liver:  Nodular contour of the liver is concerning for cirrhosis.   Gallbladder and bile ducts:  Cholecystectomy.  No ductal dilation.   Pancreas:  Unremarkable.  No ductal dilation.  No mass.   Spleen:  Splenomegaly.  The spleen is heterogeneous which could represent a perfusion anomaly.   Adrenals:  Unremarkable.  No mass.   Kidneys and ureters:  Unremarkable.  No hydronephrosis.  No solid mass.   Stomach and bowel:  Diverticulosis.  No obstruction.  No mucosal thickening.  PELVIS:   Appendix:  No findings to suggest acute appendicitis.   Bladder:  Unremarkable.  No mass.   Reproductive:  Unremarkable as visualized.  ABDOMEN and PELVIS:   Intraperitoneal space:  Unremarkable.  No significant fluid collection.  No free  air.   Bones joints:  There are degenerative changes of the spine.  No acute fracture.  No dislocation.   Soft tissues:  Unremarkable.   Lymph nodes:  Unremarkable.  No enlarged lymph nodes. IMPRESSION:     1.  No aortic aneurysm or dissection. 2.  Nodular contour of the liver is concerning for cirrhosis. 3.  The spleen is heterogeneous which could represent a perfusion anomaly. 4.  Splenomegaly is nonspecific but concerning for portal hypertension. 5.  Diverticulosis.     Electronically signed by Yuki Starr MD on 11-24-23 at 0124    CT Angiogram Chest    Result Date: 11/24/2023  Patient: MARIBELL RUGGIERO  Time Out: 01:22 Exam(s): CTA CHEST EXAM:   CT Angiography Chest With Intravenous Contrast CLINICAL HISTORY:      Concern for acute aortic syndrome. TECHNIQUE:   Axial computed tomographic angiography images of the chest with intravenous contrast.  CTDI is 37 mGy and DLP is 934 mGy-cm.  This CT exam was performed according to the principle of ALARA (As Low As Reasonably Achievable) by using one or more of the following dose reduction techniques: automated exposure control, adjustment of the mA and or kV according to patient size, and or use of iterative reconstruction technique.   MIP reconstructed images were created and reviewed. COMPARISON:   No relevant prior studies available. FINDINGS:   Pulmonary arteries:  Unremarkable.  No pulmonary embolus.   Aorta:  Mild atherosclerosis.  No aortic aneurysm or dissection.   Lungs:  Diffuse bilateral airspace opacities are concerning for multifocal pneumonia and or aspiration.  Cannot exclude superimposed pulmonary edema.   Pleural space:  Unremarkable.  No significant effusion.  No pneumothorax.   Heart:  Unremarkable.  No cardiomegaly.  No significant pericardial effusion.  No evidence of RV dysfunction.   Bones joints:  There are degenerative changes of the spine.  No acute fracture.   Soft tissues:  Unremarkable.   Lymph nodes:  Unremarkable.  No enlarged lymph  nodes. IMPRESSION:     1.  No aortic aneurysm or dissection. 2.  No pulmonary embolus. 3.  Diffuse bilateral airspace opacities are concerning for multifocal pneumonia and or aspiration.  Cannot exclude superimposed pulmonary edema.     Electronically signed by Yuki Starr MD on 11-24-23 at 0122    XR Chest 1 View    Result Date: 11/24/2023  Patient: MARIBELL RUGGIERO  Time Out: 01:00 Exam(s): XR CXR 1 VIEW EXAM:   XR Chest, 1 View CLINICAL HISTORY:      Nausea and upper abd pain. TECHNIQUE:   Frontal view of the chest. COMPARISON:   Chest radiograph 11 20 2023 FINDINGS:   Lungs:  Significantly worsened bilateral airspace opacities.   Pleural space:  New small left pleural effusion.  No pneumothorax.   Heart:  Unremarkable.  No cardiomegaly.   Mediastinum:  Unremarkable.  Normal mediastinal contour.   Bones joints:  There are degenerative changes of the spine.  No acute fracture. IMPRESSION:     1.  Significantly worsened bilateral airspace opacities. 2.  New small left pleural effusion.     Electronically signed by Yuki Starr MD on 11-24-23 at 0100    XR Chest 1 View    Result Date: 11/20/2023  XR CHEST 1 VW-  HISTORY: Female who is 83 years-old, hypoxemia  TECHNIQUE: Frontal views of the chest  COMPARISON: 11/8/2023  FINDINGS: The heart size is borderline. Aorta is calcified. Mild prominence of vascular interstitial markings. Previous right pneumothorax is no longer seen, may be resolved or obscured. Infiltrative opacities in the right lung appear partially improved, continued follow-up recommended. Small likely atelectasis or scarring at the left midlung. No large pleural effusion, or left pneumothorax. No acute osseous process.      Partial improvement.    This report was finalized on 11/20/2023 10:27 AM by Dr. Reilly Ritter M.D on Workstation: [id number]      XR Chest 1 View    Result Date: 11/9/2023  Portable chest radiograph  HISTORY: Pneumothorax  TECHNIQUE: Single AP portable radiograph of the chest   COMPARISON: Chest radiograph 11/8/2023      FINDINGS AND IMPRESSION: Previously seen right pneumothorax has decreased in size resulting in approximately 1.6 cm in pleural-parenchymal separation (previously 2.8 cm). There is moderate pulmonary opacification throughout bilateral lungs, right greater than left, suggestive of multifocal pneumonia and/or pulmonary edema in the appropriate clinical context and correlation with patient history is recommended with follow-up chest CT if clinically indicated. Given the somewhat masslike opacification overlying the right lung, at least continued attention on follow-up chest radiograph in 4 to 6 weeks is recommended to ensure appropriate evolution/resolution and exclude any possibility of neoplasm.  Cardiac silhouette is within normal its for size.  This report was finalized on 11/9/2023 1:13 AM by Dr. Cuba Lieberman M.D on Workstation: [id number]      CT Guided Chest Tube    Result Date: 11/8/2023  LIMITED CT CHEST  HISTORY:  right pneumothorax  COMPARISON: Chest x-ray earlier same day. CT  11/04/2023.  FINDINGS: After informed written consent was obtained with a witness present, the patient was placed in supine position. A planning CT scan of the chest was performed for anticipated chest tube placement. Only a relatively small right pneumothorax was seen at this time, potentially decreased from preceding chest x-ray. Images were reviewed with Dr. Alford who decided that chest tube placement was not warranted at this time. Compared to the recent CT a peripherally extending consolidated area in the right upper lobe was present, consistent with bronchoscopy procedure related hemorrhage/hematoma. Partially obstructing opacity in left lower lobe bronchus could represent blood clot with associated consolidations/atelectasis in the left lung base. Less pronounced right lower lobe opacities could also be related to bleeding. Redemonstrated scattered bilateral groundglass opacities not  directly comparable secondary to technique, worsening not excluded. New trace bilateral pleural effusions. All elements of maximal sterile technique were followed. Radiation dose reduction techniques were utilized, including automated exposure control and exposure modulation based on body size.       Small right pneumothorax, no chest tube placed at this time. See above for all findings.   This report was finalized on 11/8/2023 5:07 PM by Dr. Bruno Haider M.D on Workstation: [id number]      XR Chest PA & Lateral    Result Date: 11/8/2023  XR CHEST PA AND LATERAL-  HISTORY: Female who is 83 years-old, check for pneumothorax status post bronchoscopy  TECHNIQUE: Frontal and lateral views of the chest  COMPARISON: 9/27/2023  FINDINGS: The heart size is borderline. Aorta is calcified. Pulmonary vasculature is congested. Extensive patchy opacities in both lungs may be edema and/or pneumonia. Dense consolidation in the right upper lobe, about 6.3 cm, may represent postbiopsy hemorrhage. Right apical pneumothorax measures about 3.1 cm, about 20% right pneumothorax. No pleural effusion, or left pneumothorax. No acute osseous process.      Critical test result. Right pneumothorax, as described. Dense consolidation in the right upper lobe may represent postbiopsy hemorrhage. Extensive patchy opacities may be edema and/or pneumonia.  Discussed by telephone with patient's nurse, Ismael, at time of interpretation, 1408, 11/8/2023  This report was finalized on 11/8/2023 2:15 PM by Dr. Reilly Ritter M.D on Workstation: [id number]      XR Chest 1 View    Result Date: 11/8/2023  XR CHEST 1 VW-, FL C ARM DURING SURGERY-  HISTORY: Female who is 83 years-old, bronchoscopy, biopsies   TECHNIQUE: FLUOROSCOPIC ASSISTANCE IN THE OPERATING ROOM.  FINDINGS:  1 intraoperative fluoroscopic spot view was obtained and recorded the PACS for review, revealing right-sided bronchoscopy. Please see operative report for full details.  Fluoroscopy time:  36.1 seconds  Radiation exposure: Dose area product: 1.6852 Gy x cm(2)        As described.  This report was finalized on 11/8/2023 11:47 AM by Dr. Reilly Ritter M.D on Workstation: [id number]      FL C Arm During Surgery    Result Date: 11/8/2023  XR CHEST 1 VW-, FL C ARM DURING SURGERY-  HISTORY: Female who is 83 years-old, bronchoscopy, biopsies   TECHNIQUE: FLUOROSCOPIC ASSISTANCE IN THE OPERATING ROOM.  FINDINGS:  1 intraoperative fluoroscopic spot view was obtained and recorded the PACS for review, revealing right-sided bronchoscopy. Please see operative report for full details.  Fluoroscopy time: 36.1 seconds  Radiation exposure: Dose area product: 1.6852 Gy x cm(2)        As described.  This report was finalized on 11/8/2023 11:47 AM by Dr. Reilly Ritter M.D on Workstation: [id number]      CT Chest Without Contrast Diagnostic    Result Date: 11/4/2023  CT CHEST WO CONTRAST DIAGNOSTIC-  INDICATIONS: Lung mass  TECHNIQUE: Radiation dose reduction techniques were utilized, including automated exposure control and exposure modulation based on body size. Unenhanced CHEST CT  COMPARISON: 5/7/2023  FINDINGS:    The heart size is borderline without pericardial effusion. Prominent coronary arterial calcifications are apparent. Ascending aorta is dilated, 3.8 cm, not significantly changed. A few small subcentimeter short axis mediastinal lymph nodes are seen that are not significant by size criteria. Assessment of vascular, mediastinal, and hilar structures is limited without intravenous contrast material. Debris is seen in the esophagus.  The airways appear clear.  No pleural effusion or pneumothorax.  The lungs show again show a pleural-based density in the lingula, axial image 58, also present the abdomen/pelvis CT from 11/2/2023 (and appears similar apart from increased extension medially), but representing a change from the chest CT from 5/7/2023, may be rounded atelectasis, pneumonia, or neoplasm. Since the  recent abdomen/pelvis CT, consolidation has developed anteriorly laterally in the left lower lobe suspicious for pneumonia. Fairly extensive groundglass and consolidative opacities are present throughout both lungs, especially mid to upper lungs that may represent edema and/or atypical pneumonia.   Upper abdominal structures show no acute findings. Gallbladder is surgically absent. 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 Mild nonspecific thickening of the adrenal glands.  Degenerative changes are seen in the spine, shoulders. No acute fracture is identified.       Redemonstration of pleural-based density in the lingula, showing increased extension medially, representing a change from the chest CT from 5/7/2023, may be rounded atelectasis, pneumonia, or neoplasm; correlate clinically, PET/CT correlation can be obtained as indicated, in addition to continued CT follow-up. Since the recent abdomen/pelvis CT, consolidation has developed anteriorly laterally in the left lower lobe suspicious for pneumonia. Fairly extensive groundglass and consolidative opacities are present throughout both lungs, especially mid to upper lungs that may represent edema and/or atypical pneumonia    This report was finalized on 11/4/2023 9:17 AM by Dr. Reilly Ritter M.D on Workstation: [employer]      CT Abdomen Pelvis Without Contrast    Result Date: 11/2/2023  CT ABDOMEN PELVIS WO CONTRAST-  INDICATIONS: Abdominal pain  TECHNIQUE: Radiation dose reduction techniques were utilized, including automated exposure control and exposure modulation based on body size. Unenhanced ABDOMEN AND PELVIS CT  COMPARISON: 8/13/2023  FINDINGS:  The gallbladder is surgically absent.  Mild nonspecific nodular thickening of the adrenal glands is seen.  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 A small arterial calcification is apparent at the left renal hilum. No hydronephrosis.  Otherwise unremarkable unenhanced appearance of the liver, spleen, adrenal  glands, pancreas, kidneys, bladder.  No bowel obstruction or abnormal bowel thickening is identified. Colonic diverticula are seen that do not appear inflamed. Minimal hiatal hernia is seen. Pelvic floor relaxation/rectocele is apparent, correlate with clinical exam.  No free intraperitoneal gas or free fluid. Umbilical hernia of fat is noted.  Scattered small mesenteric and para-aortic lymph nodes are seen that are not significant by size criteria.  Abdominal aorta is not aneurysmal. Aortic and other arterial calcifications are present.  The lung bases show a new pleural-based density in the lingula, 1.3 x 3.3 cm on axial image 8, that could be rounded atelectasis or potentially a focus of infection or even neoplasm, correlate clinically, PET/CT correlation can be obtained as indicated, in addition to continued CT follow-up. Reticulonodular opacities in the lower lobes may be inflammatory/infectious in etiology, follow-up recommended.  Degenerative changes are seen in the spine. Stable sclerotic foci in the left aspect of the sacrum, right femur, nonspecific, may represent bone island. No acute fracture is identified.          1. A new indeterminate density in the lingula, as described. Reticulonodular pulmonary opacities in the bilateral lower lobes. Follow-up evaluation advised.  2. Colonic diverticulosis. No acute inflammatory process of bowel is identified. Follow-up as indications persist.  3. No urolithiasis or hydronephrosis. Small arterial calcification of the left renal hilum.  This report was finalized on 11/2/2023 7:22 PM by Dr. Reilly Ritter M.D on Workstation: [id number]      Results for orders placed during the hospital encounter of 11/23/23    Adult Transthoracic Echo Complete W/ Cont if Necessary Per Protocol    Interpretation Summary    Left ventricular systolic function is normal. Calculated left ventricular EF = 55.3% Normal left ventricular cavity size noted. Left ventricular wall thickness is  consistent with moderate concentric hypertrophy. All left ventricular wall segments contract normally. Left ventricular diastolic function is consistent with (grade II w/high LAP) pseudonormalization.    The left atrial cavity is moderately dilated. Left atrial volume is underestimated  The right atrial cavity is moderately dilated.    The aortic valve is abnormal in structure. There is severe thickening of the non-coronary cusp(s) of the aortic valve. There is a mobile undulating mass on what appears to be the ventricular surface of the aortic valve though a clear attachment is not visualized. It is highly suspicious for vegetation. Cannot determine if this is a bileaflet or trileaflet valve Trace aortic valve regurgitation is present. Mild aortic valve stenosis is present.    There is moderate, bileaflet mitral valve thickening present. Mild to moderate mitral valve regurgitation is present. Mild mitral valve stenosis is present. The mean mitral valve gradient is 5 mm Hg at a heart rate of 97 bpm    Trace tricuspid valve regurgitation is present. Estimated right ventricular systolic pressure from tricuspid regurgitation is moderately elevated (45-55 mmHg). Calculated right ventricular systolic pressure from tricuspid regurgitation is 45 mmHg.    Discussed with patient's nurse on floor who will contact primary team for cardiology consult      Chest x-ray with perihilar infiltrates/edema looks worse than the 11/24 CT angiogram which had some similar    Assessment & Plan     Acute hypoxic respiratory failure worsened with chest pain question angina cardiac cath planned for later today.  Cardiac enzymes ordered but I think this is infiltrate looks an awful lot like pulmonary edema and I am going to order a proBNP.  Since she is not real keen on a tight fitting mask on her face we will try heated high flow O2 if needed.  Acute heart failure preserved ejection fraction I do think the patient is in heart failure and has  some pulmonary edema and her significantly elevated proBNP with normal renal function would support this diuretics have been ordered.  MRSA bacteremia  Mitral valve thickening previously was felt to be marantic  Myeloproliferative disorder hematology following  Pulmonary hypertension by echocardiogram  Coronary artery disease recent stent cath today no acute stenoses  Diabetes mellitus type 2      Gualberto Luther Jr, MD  11/29/23  11:03 EST    Time:

## 2023-11-29 NOTE — NURSING NOTE
"Pt called out  around 0700 c/o 8/10 chest pain very heavy midsternal and back. She said its the worst she's had. STAT orders - EKG, Troponin placed by this RN. 1 nitro given, cp went from 8 to 6 to 3 and then she said \"the pain is gone\" meanwhile charge RN had paged MARGARITA ZAVALA, waiting response. SBP ran 116, 120, then dropped down to 70s. Pt required replacement of nasal cannula to high flow for sats in low 80s on 6L. MD Dr. Case responded close to 0730  - orders for STAT xray, SBP Improving to 90s.   "

## 2023-11-29 NOTE — PROGRESS NOTES
"Enter Query Response Below      Query Response:   Gram negative pneumonia (excluding Haemophilus influenzae)   Electronically signed by Ugo Case MD, 23, 12:09 PM EST.             If applicable, please update the problem list.   Patient: Catherine Boyer        : 1940  Account: [id number]           Admit Date:         How to Respond to this query:       a. Click New Note     b. Answer query within the yellow box.                c. Update the Problem List, if applicable.      If you have any questions about this query contact me at: [email or web address]     Dr. Case,     83 yr female noted with \"MRSA bacteremia, CAP (community acquired pneumonia)\" in progress notes. Patient noted with a medical history of breast cancer, Atrial fibrillation, HTN and DMII. Unasyn started on  thru  and vancomycin started on  thru . MRSA pcr positive.     After study, please clarify the type of pneumonia the patient was treated/monitored for:    Gram negative pneumonia (excluding Haemophilus influenzae)  MRSA pneumonia  Bacterial pneumonia unspecified  Aspiration pneumonia  Other- specify______  Unable to determine      By submitting this query, we are merely seeking further clarification of documentation to accurately reflect all conditions that you are monitoring, evaluating, treating or that extend the hospitalization or utilize additional resources of care. Please utilize your independent clinical judgment when addressing the question(s) above.     This query and your response, once completed, will be entered into the legal medical record.    Sincerely,  Alyssia ENNIS Rn  Clinical Documentation Integrity Program   "

## 2023-11-29 NOTE — PROGRESS NOTES
Subjective     HISTORY OF PRESENT ILLNESS:   The patient has had no recurrence of nausea/vomiting since restarting momelotinib.  She has had worsening oxygenation overnight and being transfused 1 unit PRBCs.    Past Medical History, Past Surgical History, Social History, Family History have been reviewed and are without significant changes except as mentioned.    Review of Systems   A comprehensive 14 point review of systems was performed and was negative except as mentioned.    Medications:  The current medication list was reviewed in the EMR    ALLERGIES:    Allergies   Allergen Reactions    Aspirin Unknown - Low Severity    Diphenhydramine Hcl Anxiety     Benadryl IVP    Hydroxyurea Other (See Comments)     Her hemoglobin drop and was stop in February 2022 and start taking Jakafi    Oxycodone Unknown - Low Severity       Objective      Vitals:    11/29/23 1428 11/29/23 1500 11/29/23 1536 11/29/23 1547   BP:  113/53  109/67   BP Location:       Patient Position:       Pulse:       Resp: 10 16 16    Temp:  97.6 °F (36.4 °C) 97.8 °F (36.6 °C) 97.8 °F (36.6 °C)   TempSrc:   Oral Oral   SpO2: 92%      Weight:       Height:             10/18/2023     4:02 PM   Current Status   ECOG score 2       Physical Exam    CONSTITUTIONAL:  Vital signs reviewed.  Elderly lady lying in bed no acute distress-getting a PICC line rest of exam deferred as the patient is  Undergoing a procedure    RECENT LABS:  Hematology WBC   Date Value Ref Range Status   11/29/2023 97.19 (C) 3.40 - 10.80 10*3/mm3 Final     RBC   Date Value Ref Range Status   11/29/2023 2.39 (L) 3.77 - 5.28 10*6/mm3 Final     Hemoglobin   Date Value Ref Range Status   11/29/2023 7.1 (L) 12.0 - 15.9 g/dL Final     Hematocrit   Date Value Ref Range Status   11/29/2023 22.9 (L) 34.0 - 46.6 % Final     Platelets   Date Value Ref Range Status   11/29/2023 273 140 - 450 10*3/mm3 Final              Assessment & Plan   Patient is a pleasant 83-year-old female with medical  history significant for JAK2 positive myeloproliferative disorder, perhaps primary myelofibrosis, A-fib on chronic Eliquis, CAD with stents, history of endocarditis and TIA admitted with nausea and vomiting.       #Nausea and vomiting due to suspected sepsis and pneumonia related:  Patient had presented to [facility] ER on 11/23/2023 with nausea and vomiting.  Patient was discharged just a day before after prolonged hospitalization for ESBL UTI, acute renal failure and respiratory distress.  Patient states that she initially reached the rehab facility, she started to feel bad.  She developed nausea and vomiting and this was brought back to the hospital.  Work-up in the ER with CT scans did not show any acute abnormalities.  Persistent bilateral pulmonary opacities, concerning for multifocal pneumonia and/or aspiration.  Elevated lactic acid level of 5.2  11/26: Continues to have N/V.  Started on IV Protonix daily.  Encouraged to ask for Zofran.  Lactate improved to 4.8.  Continue IV fluids and antibiotics as per primary. 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  This is likely primary myelofibrosis +/-steroids and infection related  White count increasing-WBC 97.1, hemoglobin 7.1, platelets 273-restarted momelotinib     #Leukocytosis: As above    #MRSA bacteremia:  ID managing, on vancomycin    #Liver cirrhosis and splenomegaly     #Chronic hypoxic respiratory failure:  She was evaluated with bronchoscopy with biopsies on 11/8/2023.  No evidence of malignancy.  Continue with gradual steroids taper-per admitting/pulmonary medicine  Antibiotics as above     #History of A-fib on Eliquis     Recommendations:  -Continue Ojjara given increasing leukocytosis/worsening anemia-monitor for recurrence of  nausea/vomiting  -Daily CBC with differential  -Agree with transfusion 1 unit PRBCs today

## 2023-11-29 NOTE — PROGRESS NOTES
Name: Catherine Boyer ADMIT: 2023   : 1940  PCP: Brett Jackson MD    MRN: [id number] LOS: 5 days   AGE/SEX: 83 y.o. female  ROOM: [facility]     Subjective     CP today  Severe  Improved with NTG but then NTG dropped BP  Increased O2 requirement  Recurrent CP    Objective   Objective   Vital Signs  Temp:  [97.3 °F (36.3 °C)-97.9 °F (36.6 °C)] 97.3 °F (36.3 °C)  Heart Rate:  [100-120] 107  Resp:  [18-20] 18  BP: ()/(48-78) 113/53  SpO2:  [79 %-96 %] 94 %  on  Flow (L/min):  [5-15] 15;   Device (Oxygen Therapy): high-flow nasal cannula;humidified  Body mass index is 25.79 kg/m².  Physical Exam  Constitutional:       General: She is in acute distress.      Appearance: She is ill-appearing and toxic-appearing.   HENT:      Head: Normocephalic and atraumatic.   Cardiovascular:      Rate and Rhythm: Normal rate and regular rhythm.   Pulmonary:      Effort: Respiratory distress (slight, decreased bs at bases) present.   Abdominal:      General: There is no distension.      Palpations: Abdomen is soft.      Tenderness: There is no abdominal tenderness.   Skin:     Coloration: Skin is pale.   Neurological:      Mental Status: She is alert.         Results Review     I reviewed the patient's new clinical results.  Results from last 7 days   Lab Units 2350 23  0338   WBC 10*3/mm3 97.19* 98.09* 98.25* 87.18*   HEMOGLOBIN g/dL 7.1* 7.4* 7.5* 8.1*   PLATELETS 10*3/mm3 273 268 300 291     Results from last 7 days   Lab Units 23  0505 23  0950 235 238   SODIUM mmol/L 134* 135* 135*  --  139   POTASSIUM mmol/L 4.7 4.7 4.0 4.2 2.7*   CHLORIDE mmol/L 96* 97* 95*  --  97*   CO2 mmol/L 22.0 24.0 26.5  --  27.1   BUN mg/dL 20 18 15  --  15   CREATININE mg/dL 0.80 0.80 0.83  --  0.76   GLUCOSE mg/dL 69 80 69  --  65   Estimated Creatinine Clearance: 45.2 mL/min (by C-G formula based on SCr of 0.8 mg/dL).  Results from last  "7 days   Lab Units 11/25/23  0246 11/24/23  0736 11/24/23  0002   ALBUMIN g/dL 3.5 4.0 3.7   BILIRUBIN mg/dL 0.8 1.1 1.0   ALK PHOS U/L 190* 265* 209*   AST (SGOT) U/L 15 20 28   ALT (SGPT) U/L 18 24 24     Results from last 7 days   Lab Units 11/29/23  0427 11/28/23  0505 11/27/23  0950 11/26/23  0338 11/25/23  0246 11/24/23  0736 11/24/23  0002   CALCIUM mg/dL 8.0* 8.7 8.4* 8.3* 8.0* 9.2 8.7   ALBUMIN g/dL  --   --   --   --  3.5 4.0 3.7   MAGNESIUM mg/dL  --   --   --   --  1.9  --  1.7   PHOSPHORUS mg/dL  --   --   --   --  4.3  --   --      Results from last 7 days   Lab Units 11/25/23  0246 11/24/23  1922 11/24/23  1315 11/24/23  1042 11/24/23  0241 11/24/23  0002   PROCALCITONIN ng/mL 0.14  --   --   --   --  0.25   LACTATE mmol/L 4.8* 6.0* 4.2* 5.2*   < >  --     < > = values in this interval not displayed.     COVID19   Date Value Ref Range Status   11/24/2023 Not Detected Not Detected - Ref. Range Final   11/08/2023 Not Detected Not Detected - Ref. Range Final     No results found for: \"HGBA1C\", \"POCGLU\"  Results for orders placed or performed during the hospital encounter of 11/23/23   Blood Culture - Blood, Arm, Left    Specimen: Arm, Left; Blood   Result Value Ref Range    Blood Culture No growth at 2 days          XR Chest 1 View  Narrative: PORTABLE CHEST X-RAY     HISTORY: Chest pain.     TECHNIQUE: Portable chest x-ray is provided and correlated with recent  prior exams.     FINDINGS: The cardiac silhouette appears enlarged but unchanged. Fairly  dense abnormal parenchymal opacity in the lungs bilaterally has a  symmetrical, perihilar distribution. This appears more dense and more  extensive than on CT angiogram chest 11/24/2023 or chest x-ray  11/20/2023. No pneumothorax.     Impression: Interval worsening in pulmonary parenchymal opacity in the  perihilar distribution which may represent progressive pulmonary edema  or pneumonia.     This report was finalized on 11/29/2023 9:20 AM by Dr. Soto" JUVE Tirado on Workstation: [id number]       Scheduled Medications  acetaminophen, 650 mg, Oral, Once   Or  acetaminophen, 650 mg, Oral, Once   Or  acetaminophen, 650 mg, Rectal, Once  apixaban, 5 mg, Oral, BID  aspirin, 81 mg, Oral, Daily  Eucerin original healing lotion, , Topical, Q12H  hydrocortisone sodium succinate, 50 mg, Intravenous, Once  lactated ringers, 1,000 mL, Intravenous, Once  Menthol-Zinc Oxide, 1 application , Topical, Q12H  metoprolol succinate XL, 25 mg, Oral, Daily  Momelotinib Dihydrochloride, 200 mg, Oral, Q24H  pantoprazole, 40 mg, Oral, Q AM  predniSONE, 15 mg, Oral, Daily With Breakfast   Followed by  [START ON 12/6/2023] predniSONE, 10 mg, Oral, Daily With Breakfast   Followed by  [START ON 12/13/2023] predniSONE, 5 mg, Oral, Daily With Breakfast  senna-docusate sodium, 2 tablet, Oral, BID  sertraline, 100 mg, Oral, Daily  sodium chloride, 250 mL, Intravenous, Once  tiotropium bromide monohydrate, 2 puff, Inhalation, Daily - RT  vancomycin, 1,000 mg, Intravenous, Q24H    Infusions  Pharmacy to dose vancomycin,     Diet  NPO Diet NPO Type: Strict NPO       Assessment/Plan     Active Hospital Problems    Diagnosis  POA    **MRSA bacteremia [R78.81, B95.62]  Yes    CAP (community acquired pneumonia) [J18.9]  Yes    Myeloproliferative disorder [D47.1]  Yes    Type 2 diabetes mellitus with circulatory disorder, 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       83 y.o. female admitted with MRSA bacteremia.    Chest pain, hypotension, worsening respiratory failure  Patient with CP this morning. Revieved NTG which improved pain but then had drop in BP. Increasing O2 requirements.   Had given a little IVFs with hypotension but then stopped when BP improved. Hgb low will give 1 unit PRBC. Cardiology re-consulted. No STEMI.  Follow repeat troponins.  Added low dose IV morphine with improvement in symptoms.   Even though she is DNR will consult Pulmonary to see if they think bipap would be of benefit.     Sepsis secondary to MRSA bacteremia   Lactic Acidosis   -Her white blood cell count is elevated, though this is partially due to the myelodysplastic syndrome.    -on IV Vanc, ID following   -Cardiology has seen. ECHO without change from previous echo so no indication for any CHERI or surgery with ok valves.   -Plan for 4 weeks of IV Vanc     Myelodysplastic syndrome  -Patient takes ojjaara. Oncology following and plan to restart. Monitor WBC and anemia.   1 unit PRBC transfusion as listed above      Atrial fibrillation  -Continue Eliquis, metoprolol      Lung infiltrates  Status post bronchoscopy on 11/8/2023 with biopsies.  Pathology showed no evidence of malignancy.  She was previously on IV steroids for prolonged course during her previous hospitalization and has then been transitioned to oral steroids and was discharged.  Starting to taper.  Plan was to have follow up CT chest 6-8 weeks from 11/10/23 with LPC  -Giving IV hydrocortisone x 1 as stress dose steroids with drop in BP 11/29    DM  A1c 6.6. Not currently on medications.     Discussed palliative care on 11/28- she still wants treatment and work towards discharge, but she states she would want to be DNR/DNI. With her clinical decline on 11/29 will have to see if she recovers and still would be plan for SNF, or if this is a progressive process that would re-visit palliative.     Eliquis for DVT prophylaxis.  DNR/DNI  Discussed with patient and nursing staff.  Anticipate discharge to SNU facility timing yet to be determined.     JAMES RN  DW Rapid team during rapid response this morning    40 minutes critical care  [id number]    Ugo Case MD  [facility]  11/29/23  10:05 EST    Addendum   I did discuss with her son her update from this morning with  chest pain and worsening clinical status (and updated him of her previous conversation which she stated DNR/DNI). He was going to call her daughter who lives in town to come to visit her    Electronically signed by Ugo Case MD, 11/29/23, 10:21 AM EST.

## 2023-11-29 NOTE — PROGRESS NOTES
Enter Query Response Below      Query Response:   Lactic acidosis due to sepsis   Electronically signed by Ugo Case MD, 23, 12:09 PM EST.               If applicable, please update the problem list.   Patient: Catherine Boyer        : 1940  Account: [id number]           Admit Date:         How to Respond to this query:       a. Click New Note     b. Answer query within the yellow box.                c. Update the Problem List, if applicable.      If you have any questions about this query contact me at: [email or web address]     Dr. Case,       83yr female noted with sepsis and lactic acidosis treated with IV antibiotics and IV fluids.     After study, please clarify the following:    Lactic acidosis due to sepsis  Lactic acidosis due to _______  Other- specify_______  Unable to determine    By submitting this query, we are merely seeking further clarification of documentation to accurately reflect all conditions that you are monitoring, evaluating, treating or that extend the hospitalization or utilize additional resources of care. Please utilize your independent clinical judgment when addressing the question(s) above.     This query and your response, once completed, will be entered into the legal medical record.    Sincerely,  Alyssia ENNIS Rn  Clinical Documentation Integrity Program

## 2023-11-29 NOTE — PROGRESS NOTES
"[facility]: Vancomycin Monitoring Note    Catherine Boyer is a 83 y.o. female who is on vancomycin for Bacteremia until 12/23    Previous Vancomycin Dose:  1000mg IV every 24 hours  Updated Cultures and Sensitivities:   11/24: Blood Cx MRSA    Results from last 7 days   Lab Units 11/28/23  0925   VANCOMYCIN TR mcg/mL 15.20     Vitals/Labs  Ht: 157.5 cm (62\"); Wt: 64 kg (141 lb)   Temp Readings from Last 1 Encounters:   11/28/23 97.3 °F (36.3 °C) (Oral)     Estimated Creatinine Clearance: 45.2 mL/min (by C-G formula based on SCr of 0.8 mg/dL).     Results from last 7 days   Lab Units 11/29/23  0427 11/28/23  0505 11/27/23  0950   CREATININE mg/dL 0.80 0.80 0.83   WBC 10*3/mm3 97.19* 98.09* 98.25*     Assessment/Plan    Current Vancomycin Dose: 1000mg IV every 24 hours; provides a predicted  mg/L.hr   Next Level Date and Time: Vanc Trough on 12/2 @ 0930  We will continue to monitor patient changes and renal function     Thank you for involving pharmacy in this patient's care. Please contact pharmacy with any questions or concerns.       Carlita Aleman, PharmD  Clinical Pharmacist      "

## 2023-11-29 NOTE — CODE DOCUMENTATION
LHA at bedside 12 lead being done.  Primary RN states NTG given on initial chest pain.  Blood ordered per  Dr Manpreet AVILA.  250cc NS already given for drop in BP after NTG.  Care over to Primary RN

## 2023-11-29 NOTE — CODE DOCUMENTATION
"Patient Name:  Catherine Boyer  YOB: 1940  MRN:  [id number]  Admit Date:  11/23/2023    Visit Diagnoses:     ICD-10-CM ICD-9-CM   1. Community acquired pneumonia, unspecified laterality  J18.9 486   2. Hypoxia  R09.02 799.02   3. Septic shock  A41.9 038.9    R65.21 785.52     995.92       Reason For Rapid:  chest pain; increasing O2 requirement (from 4L to 9L)    RN Communicated With:  primary RN communicated w/ Dr. Fink; Dr. Case came to bedside.      Rapid Outcome:  pt to stay on unit    Communication From Rapid Team:   Patient c/o 10/10 chest pain that radiates to back. Alert, oriented; somewhat flat d/t pain. BP same in bilateral arms. Chest XRay, ABG, and EKG done. BP dropped w/ nitro tab and morphine given prior to our arrival. Hx thickening on aortic valve, sepsis, and cad (previous stents). Pt DNR. Primary RN communicated w/ Dr. Fink who asks to make pt NPO. Dr. Case to bedside to assess during rapid. Hypoxia noted on ABG, O2 increased. Plan to await results of testing and further plans by cardiology. Primary RN and pt agreeable w/ plan.      Most Recent Vital Signs  Temp:  [97.3 °F (36.3 °C)-97.9 °F (36.6 °C)] 97.3 °F (36.3 °C)  Heart Rate:  [100-120] 114  Resp:  [18-20] 18  BP: ()/(48-78) 113/53  SpO2:  [79 %-96 %] 92 %  on  Flow (L/min):  [5-6] 6;   Device (Oxygen Therapy): high-flow nasal cannula    Labs:  Results from last 7 days   Lab Units 11/24/23  0201   COVID19  Not Detected     No results found for: \"POCGLU\"  Site   Date Value Ref Range Status   11/29/2023 Right Radial  Final     José Miguel's Test   Date Value Ref Range Status   11/29/2023 Positive  Final     pH, Arterial   Date Value Ref Range Status   11/29/2023 7.463 (H) 7.350 - 7.450 pH units Final     pCO2, Arterial   Date Value Ref Range Status   11/29/2023 35.7 35.0 - 45.0 mm Hg Final     pO2, Arterial   Date Value Ref Range Status   11/29/2023 51.8 (C) 80.0 - 100.0 mm Hg Final     HCO3, Arterial   Date Value " Ref Range Status   11/29/2023 25.6 22.0 - 28.0 mmol/L Final     Base Excess, Arterial   Date Value Ref Range Status   11/29/2023 1.8 0.0 - 2.0 mmol/L Final     Comment:     Serial Number: [id number]:  [id number]     O2 Saturation, Arterial   Date Value Ref Range Status   11/29/2023 88.4 (L) 92.0 - 98.5 % Final     CO2 Content   Date Value Ref Range Status   11/29/2023 26.7 23 - 27 mmol/L Final     Barometric Pressure for Blood Gas   Date Value Ref Range Status   11/29/2023 752.5000 mmHg Final     Modality   Date Value Ref Range Status   11/29/2023 HFNC  Final     Results from last 7 days   Lab Units 11/29/23  0427 11/28/23  0505 11/27/23  0950 11/26/23  0338   WBC 10*3/mm3 97.19* 98.09* 98.25* 87.18*   HEMOGLOBIN g/dL 7.1* 7.4* 7.5* 8.1*   PLATELETS 10*3/mm3 273 268 300 291     Results from last 7 days   Lab Units 11/29/23  0427 11/28/23  0505 11/27/23  0950 11/26/23  0338 11/25/23  0246 11/24/23  0736 11/24/23  0002   SODIUM mmol/L 134* 135* 135*   < > 140 139 135*   POTASSIUM mmol/L 4.7 4.7 4.0   < > 3.3* 4.1 4.4   CHLORIDE mmol/L 96* 97* 95*   < > 95* 89* 93*   CO2 mmol/L 22.0 24.0 26.5   < > 29.0 27.2 26.2   BUN mg/dL 20 18 15   < > 22 23 28*   CREATININE mg/dL 0.80 0.80 0.83   < > 0.86 0.91 0.87   GLUCOSE mg/dL 69 80 69   < > 116* 133* 210*   ALBUMIN g/dL  --   --   --   --  3.5 4.0 3.7   BILIRUBIN mg/dL  --   --   --   --  0.8 1.1 1.0   ALK PHOS U/L  --   --   --   --  190* 265* 209*   AST (SGOT) U/L  --   --   --   --  15 20 28   ALT (SGPT) U/L  --   --   --   --  18 24 24    < > = values in this interval not displayed.   Estimated Creatinine Clearance: 45.2 mL/min (by C-G formula based on SCr of 0.8 mg/dL).  Results from last 7 days   Lab Units 11/29/23  0745 11/29/23  0427 11/26/23  1422 11/26/23  1222 11/24/23  0002   HSTROP T ng/L 32* 27* 22* 30* 33*   PROBNP pg/mL  --   --   --   --  3,717.0*     Results from last 7 days   Lab Units 11/25/23  0246 11/24/23  1922 11/24/23  1315 11/24/23  1042  "11/24/23  0756 11/24/23  0241 11/24/23  0002   PROCALCITONIN ng/mL 0.14  --   --   --   --   --  0.25   LACTATE mmol/L 4.8* 6.0* 4.2* 5.2* 5.6* 5.2*  --      Results from last 7 days   Lab Units 11/29/23  0845   PH, ARTERIAL pH units 7.463*   PO2 ART mm Hg 51.8*   PCO2, ARTERIAL mm Hg 35.7   HCO3 ART mmol/L 25.6   O2 SATURATION ART % 88.4*   MODALITY  HFNC   No results found for: \"STREPPNEUAG\", \"LEGANTIGENUR\"  Results from last 7 days   Lab Units 11/26/23  1309   BLOODCX  No growth at 2 days     Results from last 7 days   Lab Units 11/24/23  0201   ADENOVIRUS DETECTION BY PCR  Not Detected   CORONAVIRUS 229E  Not Detected   CORONAVIRUS HKU1  Not Detected   CORONAVIRUS NL63  Not Detected   CORONAVIRUS OC43  Not Detected   HUMAN METAPNEUMOVIRUS  Not Detected   HUMAN RHINOVIRUS/ENTEROVIRUS  Not Detected   INFLUENZA B PCR  Not Detected   PARAINFLUENZA 1  Not Detected   PARAINFLUENZA VIRUS 2  Not Detected   PARAINFLUENZA VIRUS 3  Not Detected   PARAINFLUENZA VIRUS 4  Not Detected   BORDETELLA PERTUSSIS PCR  Not Detected   CHLAMYDOPHILA PNEUMONIAE PCR  Not Detected   MYCOPLAMA PNEUMO PCR  Not Detected   INFLUENZA A PCR  Not Detected   RSV, PCR  Not Detected       NIH Stroke Scale:  n/a                                                         Please refer to full rapid documentation on summary page under Index / Code Timeline  "

## 2023-11-30 NOTE — PLAN OF CARE
Goal Outcome Evaluation:      VSS. Heated high flow. On 40mg IV lasix. Pt c/o slight trouble  breathing that improved throughout the night. No acute events

## 2023-11-30 NOTE — PROGRESS NOTES
LOS: 6 days   Patient Care Team:  Brett Jackson MD as PCP - General (Internal Medicine)  Gerard Foreman MD as Referring Physician (Medical Oncology)  Darrell Reinoso MD as Consulting Physician (Hematology and Oncology)  Albin Barriga MD as Cardiologist (Cardiology)  Richard Aldana, RN as     Subjective     Patient is a 83 y.o. female.  Asked to see regarding respiratory failure patient is resting comfortably on heated high flow O2 she feels better than yesterday no chest pain today not short of breath on the O2 but able to rest a little bit better today says she is just exhausted.    Review of Systems:          Objective     Vital Signs  Vital Sign Min/Max for last 24 hours  Temp  Min: 97.9 °F (36.6 °C)  Max: 98.6 °F (37 °C)   BP  Min: 95/51  Max: 130/69   Pulse  Min: 88  Max: 107   Resp  Min: 16  Max: 18   SpO2  Min: 91 %  Max: 99 %   Flow (L/min)  Min: 11  Max: 501   Weight  Min: 61.8 kg (136 lb 3.2 oz)  Max: 61.8 kg (136 lb 3.2 oz)        Ventilator/Non-Invasive Ventilation Settings (From admission, onward)      None                         Body mass index is 24.91 kg/m².  I/O last 3 completed shifts:  In: 379.7 [Blood:379.7]  Out: 1500 [Urine:1500]  I/O this shift:  In: -   Out: 1650 [Urine:1650]        Physical Exam:  General Appearance: Patient is resting in bed she is on heated high flow O2 at 50 L and 70% with oxygen saturations between 95 and 99% she does not appear in distress  Eyes: Conjunctiva clear and anicteric  ENT: Mucous membranes are moist no exudate  Neck: No jugular venous tension, trachea midline  Lungs: Clear nonlabored symmetric expansion really do not hear any rales she is nonlabored  Cardiac: Cardiac heart rate in the 1 teens no murmur  Abdomen: Soft no hepatosplenomegaly or masses  : Not examined  Musculoskeletal: Mild thoracic kyphosis  Skin: Warm and dry no jaundice no petechiae  Neuro: He is alert and oriented cooperative following commands and grossly  intact  Extremities/P Vascular: No clubbing no cyanosis no edema palpable radial and dorsalis pedis pulses  MSE: Pleasant and appropriate       Labs:  Results from last 7 days   Lab Units 11/30/23  0421 11/29/23  0427 11/28/23  0505 11/27/23  0950 11/26/23  2325 11/26/23  0338 11/25/23  0246 11/24/23  0736 11/24/23  0002   GLUCOSE mg/dL 202* 69 80 69  --  65 116* 133* 210*   SODIUM mmol/L 139 134* 135* 135*  --  139 140 139 135*   POTASSIUM mmol/L 4.0 4.7 4.7 4.0 4.2 2.7* 3.3* 4.1 4.4   MAGNESIUM mg/dL 1.7  --   --   --   --   --  1.9  --  1.7   CO2 mmol/L 24.9 22.0 24.0 26.5  --  27.1 29.0 27.2 26.2   CHLORIDE mmol/L 96* 96* 97* 95*  --  97* 95* 89* 93*   ANION GAP mmol/L 18.1* 16.0* 14.0 13.5  --  14.9 16.0* 22.8* 15.8*   CREATININE mg/dL 0.93 0.80 0.80 0.83  --  0.76 0.86 0.91 0.87   BUN mg/dL 21 20 18 15  --  15 22 23 28*   BUN / CREAT RATIO  22.6 25.0 22.5 18.1  --  19.7 25.6* 25.3* 32.2*   CALCIUM mg/dL 7.6* 8.0* 8.7 8.4*  --  8.3* 8.0* 9.2 8.7   ALK PHOS U/L 155*  --   --   --   --   --  190* 265* 209*   TOTAL PROTEIN g/dL 4.6*  --   --   --   --   --  5.6* 6.7 6.2   ALT (SGPT) U/L 12  --   --   --   --   --  18 24 24   AST (SGOT) U/L 15  --   --   --   --   --  15 20 28   BILIRUBIN mg/dL 0.9  --   --   --   --   --  0.8 1.1 1.0   ALBUMIN g/dL 3.0*  --   --   --   --   --  3.5 4.0 3.7   GLOBULIN gm/dL  --   --   --   --   --   --  2.1 2.7 2.5     Estimated Creatinine Clearance: 38.3 mL/min (by C-G formula based on SCr of 0.93 mg/dL).      Results from last 7 days   Lab Units 11/30/23  0421 11/29/23  0427 11/28/23  0505 11/27/23  0950 11/26/23  0338 11/25/23  0246 11/24/23  0736 11/24/23  0002   WBC 10*3/mm3 78.89* 97.19* 98.09* 98.25* 87.18* 84.33* 83.18* 63.14*   RBC 10*6/mm3 2.84* 2.39* 2.41* 2.66* 2.67* 2.84* 3.40* 3.13*   HEMOGLOBIN g/dL 8.4* 7.1* 7.4* 7.5* 8.1* 8.5* 10.1* 9.4*   HEMATOCRIT % 26.2* 22.9* 23.0* 24.4* 24.9* 26.3* 31.4* 29.3*   MCV fL 92.3 95.8 95.4 91.7 93.3 92.6 92.4 93.6   MCH pg 29.6  29.7 30.7 28.2 30.3 29.9 29.7 30.0   MCHC g/dL 32.1 31.0* 32.2 30.7* 32.5 32.3 32.2 32.1   RDW % 19.0* 20.8* 20.0* 19.7* 19.7* 19.9* 19.3* 19.4*   RDW-SD fl 61.4* 70.4* 67.7* 64.5* 67.2* 67.2* 63.2* 64.6*   MPV fL 11.5 12.5* 12.0 11.8 11.8 11.9 12.6* 12.7*   PLATELETS 10*3/mm3 285 273 268 300 291 293 298 232   NEUTROS ABS 10*3/mm3 50.49* 71.92* 87.30*  --   --   --   --  53.67*   EOS ABS 10*3/mm3  --   --   --   --   --   --   --  0.63*   NRBC /100 WBC 0.3* 1.0*  0.4*  --   --   --   --   --   --      Results from last 7 days   Lab Units 11/29/23  0845   PH, ARTERIAL pH units 7.463*   PO2 ART mm Hg 51.8*   PCO2, ARTERIAL mm Hg 35.7   HCO3 ART mmol/L 25.6     Results from last 7 days   Lab Units 11/30/23  0421 11/29/23  1108 11/29/23  0745   HSTROP T ng/L 27* 29* 32*     Results from last 7 days   Lab Units 11/29/23  0745 11/24/23  0002   PROBNP pg/mL 8,066.0* 3,717.0*     Results from last 7 days   Lab Units 11/30/23  0421   TSH uIU/mL 0.726     Results from last 7 days   Lab Units 11/25/23  0246 11/24/23  1922 11/24/23  1315 11/24/23  1042 11/24/23  0756 11/24/23  0241 11/24/23  0002   LACTATE mmol/L 4.8* 6.0* 4.2* 5.2* 5.6* 5.2*  --    PROCALCITONIN ng/mL 0.14  --   --   --   --   --  0.25         Microbiology Results (last 10 days)       Procedure Component Value - Date/Time    Blood Culture - Blood, Arm, Left [[id number]]  (Normal) Collected: 11/26/23 1309    Lab Status: Preliminary result Specimen: Blood from Arm, Left Updated: 11/30/23 1330     Blood Culture No growth at 4 days    Blood Culture - Blood, Arm, Left [[id number]]  (Normal) Collected: 11/26/23 1222    Lab Status: Preliminary result Specimen: Blood from Arm, Left Updated: 11/30/23 1230     Blood Culture No growth at 4 days    Blood Culture - Blood, Arm, Left [[id number]]  (Abnormal)  (Susceptibility) Collected: 11/24/23 0241    Lab Status: Final result Specimen: Blood from Arm, Left Updated: 11/27/23 0620     Blood Culture Staphylococcus aureus, MRSA      Comment:   Infectious disease consultation is highly recommended to rule out distant foci of infection.  Methicillin resistant Staphylococcus aureus, Patient may be an isolation risk.        Isolated from Aerobic Bottle     Gram Stain Aerobic Bottle Gram positive cocci in clusters    Narrative:      Less than seven (7) mL's of blood was collected.  Insufficient quantity may yield false negative results.    Susceptibility        Staphylococcus aureus, MRSA      BIJAN      Gentamicin Susceptible      Oxacillin Resistant      Rifampin Susceptible      Vancomycin Susceptible                       Susceptibility Comments       Staphylococcus aureus, MRSA    This isolate is presumed to be clindamycin resistant based on detection of inducible clindamycin resistance.  Clindamycin may still be effective in some patients.               Blood Culture ID, PCR - Blood, Arm, Left [[id number]]  (Abnormal) Collected: 11/24/23 0241    Lab Status: Final result Specimen: Blood from Arm, Left Updated: 11/25/23 0447     BCID, PCR Staph aureus. mecA/C and MREJ (methicillin resistance gene) detected. Identification by BCID2 PCR.     BOTTLE TYPE Aerobic Bottle    Narrative:      Infectious disease consultation is highly recommended to rule out distant foci of infection.    Blood Culture - Blood, Hand, Right [[id number]]  (Normal) Collected: 11/24/23 0201    Lab Status: Final result Specimen: Blood from Hand, Right Updated: 11/29/23 0226     Blood Culture No growth at 5 days    Narrative:      Less than seven (7) mL's of blood was collected.  Insufficient quantity may yield false negative results.    Respiratory Panel PCR w/COVID-19(SARS-CoV-2) DAMIAN/ARIANA/JAGDEEP/PAD/COR/ADRIANA In-House, NP Swab in UTM/VTM, 2 HR TAT - Swab, Nasopharynx [[id number]]  (Normal) Collected: 11/24/23 0201    Lab Status: Final result Specimen: Swab from Nasopharynx Updated: 11/24/23 0400     ADENOVIRUS, PCR Not Detected     Coronavirus 229E Not Detected     Coronavirus HKU1  Not Detected     Coronavirus NL63 Not Detected     Coronavirus OC43 Not Detected     COVID19 Not Detected     Human Metapneumovirus Not Detected     Human Rhinovirus/Enterovirus Not Detected     Influenza A PCR Not Detected     Influenza B PCR Not Detected     Parainfluenza Virus 1 Not Detected     Parainfluenza Virus 2 Not Detected     Parainfluenza Virus 3 Not Detected     Parainfluenza Virus 4 Not Detected     RSV, PCR Not Detected     Bordetella pertussis pcr Not Detected     Bordetella parapertussis PCR Not Detected     Chlamydophila pneumoniae PCR Not Detected     Mycoplasma pneumo by PCR Not Detected    Narrative:      In the setting of a positive respiratory panel with a viral infection PLUS a negative procalcitonin without other underlying concern for bacterial infection, consider observing off antibiotics or discontinuation of antibiotics and continue supportive care. If the respiratory panel is positive for atypical bacterial infection (Bordetella pertussis, Chlamydophila pneumoniae, or Mycoplasma pneumoniae), consider antibiotic de-escalation to target atypical bacterial infection.                aspirin, 81 mg, Oral, Daily  Eucerin original healing lotion, , Topical, Q12H  furosemide, 40 mg, Intravenous, Once  furosemide, 40 mg, Intravenous, Q8H  lactated ringers, 1,000 mL, Intravenous, Once  Menthol-Zinc Oxide, 1 application , Topical, Q12H  metoprolol succinate XL, 25 mg, Oral, Daily  Momelotinib Dihydrochloride, 200 mg, Oral, Q24H  pantoprazole, 40 mg, Oral, Q AM  predniSONE, 15 mg, Oral, Daily With Breakfast   Followed by  [START ON 12/6/2023] predniSONE, 10 mg, Oral, Daily With Breakfast   Followed by  [START ON 12/13/2023] predniSONE, 5 mg, Oral, Daily With Breakfast  senna-docusate sodium, 2 tablet, Oral, BID  sertraline, 100 mg, Oral, Daily  sodium chloride, 10 mL, Intravenous, Q12H  tiotropium bromide monohydrate, 2 puff, Inhalation, Daily - RT  vancomycin, 1,000 mg, Intravenous,  Q24H      Pharmacy to dose vancomycin,         Diagnostics:  XR Chest 1 View    Result Date: 11/30/2023  XR CHEST 1 VW-  HISTORY: Female who is 83 years-old, dyspnea  TECHNIQUE: Frontal view of the chest  COMPARISON: 11/29/2023  FINDINGS: The heart size is borderline. Extensive bilateral infiltrates appear similar to prior exam. There may be mild left pleural effusion. No pneumothorax. No acute osseous process.      Extensive bilateral infiltrates appear similar to prior exam.    This report was finalized on 11/30/2023 1:35 PM by Dr. Reilly Ritter M.D on Workstation: [id number]      Cardiac Catheterization/Vascular Study    Result Date: 11/29/2023  CONCLUSION: 1.  Stable coronary artery disease: Mild to moderate nonobstructive disease of the LAD and left circumflex arteries *Patent diagonal branch stent with no significant in-stent restenosis *100% chronic total occlusion of the distal right coronary artery with left-to-right collateral filling RECOMMENDATIONS: Continue medical management of coronary artery disease. FINDINGS: SELECTIVE CORONARY ANGIOGRAMS: 1.  Left main artery: Large-caliber vessel with 0% stenosis.  The vessel bifurcates into left anterior descending and left circumflex arteries. 2.  Left anterior descending artery: Moderate caliber vessel with 0% proximal stenosis.  Proximal vessel gives rise to a moderate caliber first diagonal branch.  There is a stent extending from the proximal to the mid vessel that is widely patent with no significant in-stent restenosis.  The ostium of the diagonal branch has a stable appearing 50% stenosis.  The mid to distal LAD following the origin of the diagonal branch is a very small caliber vessel with stable appearing 40 to 50% mid stenosis. 3.  Left circumflex artery: Proximally a large caliber vessel with 0% stenosis.  Gives rise to a moderate caliber first obtuse marginal branch with no significant disease.  The mid circumflex tapers to a moderate caliber  vessel with no significant disease and gives rise to a moderate caliber second obtuse marginal branch with 30 to 40% proximal stenosis.  The distal continuation circumflex vessel tapers to small caliber with no significant stenosis. 4.  Right coronary artery: Proximally large-caliber vessel.  There is diffuse severe calcification extending from the proximal through the distal vessel.  The distal vessel has a chronic 100% total occlusion.  There is left-to-right collateral filling of the posterior descending and posterolateral branch. PROCEDURE: After informed consent was obtained the patient is brought to the cardiac catheterization laboratory where her right wrist was prepped and draped in a sterile manner.  1 mL of 2% lidocaine was infused subcutaneously to the right wrist.  Access to the right radial artery is obtained using a micropuncture needle followed by placement of a 5/6 [place]ran slender glide sheath. Had some difficulty advancing a J-wire through the proximal radial artery.  Was able to advance a BMW wire easily and advance the diagnostic catheter over this wire.  At the innominate artery there was severe tortuosity and difficulty advancing into the ascending aorta with a J-wire.  It exchanged out for a Glidewire which I was able to advance into the ascending aorta.  The remainder of the procedure was performed without any significant issues. Selective coronary angiogram to perform using a 5 Andorran FL 3.5 and a 5 Andorran FR4 diagnostic catheters.  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 Following clinician procedure all wires and catheters were removed.  Radial sheath was removed and a TR band placed.  After hemostasis was achieved the patient was transferred to the recovery area in stable condition.     XR Chest 1 View    Result Date: 11/29/2023  PORTABLE CHEST X-RAY  HISTORY: Chest pain.  TECHNIQUE: Portable chest x-ray is provided and correlated with  recent prior exams.  FINDINGS: The cardiac silhouette appears enlarged but unchanged. Fairly dense abnormal parenchymal opacity in the lungs bilaterally has a symmetrical, perihilar distribution. This appears more dense and more extensive than on CT angiogram chest 11/24/2023 or chest x-ray 11/20/2023. No pneumothorax.      Interval worsening in pulmonary parenchymal opacity in the perihilar distribution which may represent progressive pulmonary edema or pneumonia.  This report was finalized on 11/29/2023 9:20 AM by Dr. Charles Tirado M.D on Workstation: [id number]      Adult Transthoracic Echo Complete W/ Cont if Necessary Per Protocol    Result Date: 11/27/2023    Left ventricular systolic function is normal. Calculated left ventricular EF = 55.3% Normal left ventricular cavity size noted. Left ventricular wall thickness is consistent with moderate concentric hypertrophy. All left ventricular wall segments contract normally. Left ventricular diastolic function is consistent with (grade II w/high LAP) pseudonormalization.   The left atrial cavity is moderately dilated. Left atrial volume is underestimated  The right atrial cavity is moderately dilated.   The aortic valve is abnormal in structure. There is severe thickening of the non-coronary cusp(s) of the aortic valve. There is a mobile undulating mass on what appears to be the ventricular surface of the aortic valve though a clear attachment is not visualized. It is highly suspicious for vegetation. Cannot determine if this is a bileaflet or trileaflet valve Trace aortic valve regurgitation is present. Mild aortic valve stenosis is present.   There is moderate, bileaflet mitral valve thickening present. Mild to moderate mitral valve regurgitation is present. Mild mitral valve stenosis is present. The mean mitral valve gradient is 5 mm Hg at a heart rate of 97 bpm   Trace tricuspid valve regurgitation is present. Estimated right ventricular systolic pressure from  tricuspid regurgitation is moderately elevated (45-55 mmHg). Calculated right ventricular systolic pressure from tricuspid regurgitation is 45 mmHg.   Discussed with patient's nurse on floor who will contact primary team for cardiology consult     CT Angiogram Abdomen Pelvis    Result Date: 11/24/2023  Patient: MARIBELL RUGGIERO  Time Out: 01:24 Exam(s): CTA ABDOMEN + PELVIS EXAM:   CT Angiography Abdomen and Pelvis With Intravenous Contrast CLINICAL HISTORY:      Concern for acute aortic syndrome. TECHNIQUE:   Axial computed tomographic angiography images of the abdomen and pelvis with intravenous contrast.  CTDI is 37 mGy and DLP is 934 mGy-cm.  This CT exam was performed according to the principle of ALARA (As Low As Reasonably Achievable) by using one or more of the following dose reduction techniques: automated exposure control, adjustment of the mA and or kV according to patient size, and or use of iterative reconstruction technique.   MIP reconstructed images were created and reviewed. COMPARISON:   No relevant prior studies available. FINDINGS:  VASCULATURE:   Aorta:  No acute findings.  No aortic aneurysm or dissection.   Celiac trunk and mesenteric arteries:  Mild atherosclerosis of the origin of the celiac trunk, SMA and BETZY without significant stenosis.   Renal arteries:  No acute findings.  No occlusion or significant stenosis.   Iliac arteries:  Moderate atherosclerosis of the bilateral common iliac arteries, external iliac arteries and internal iliac arteries.  No occlusion or significant stenosis.   Lung bases:  Unremarkable.  No mass.  No consolidation.  ABDOMEN:   Liver:  Nodular contour of the liver is concerning for cirrhosis.   Gallbladder and bile ducts:  Cholecystectomy.  No ductal dilation.   Pancreas:  Unremarkable.  No ductal dilation.  No mass.   Spleen:  Splenomegaly.  The spleen is heterogeneous which could represent a perfusion anomaly.   Adrenals:  Unremarkable.  No mass.   Kidneys and  ureters:  Unremarkable.  No hydronephrosis.  No solid mass.   Stomach and bowel:  Diverticulosis.  No obstruction.  No mucosal thickening.  PELVIS:   Appendix:  No findings to suggest acute appendicitis.   Bladder:  Unremarkable.  No mass.   Reproductive:  Unremarkable as visualized.  ABDOMEN and PELVIS:   Intraperitoneal space:  Unremarkable.  No significant fluid collection.  No free air.   Bones joints:  There are degenerative changes of the spine.  No acute fracture.  No dislocation.   Soft tissues:  Unremarkable.   Lymph nodes:  Unremarkable.  No enlarged lymph nodes. IMPRESSION:     1.  No aortic aneurysm or dissection. 2.  Nodular contour of the liver is concerning for cirrhosis. 3.  The spleen is heterogeneous which could represent a perfusion anomaly. 4.  Splenomegaly is nonspecific but concerning for portal hypertension. 5.  Diverticulosis.     Electronically signed by Yuki Starr MD on 11-24-23 at 0124    CT Angiogram Chest    Result Date: 11/24/2023  Patient: MARIBELL RUGGIERO  Time Out: 01:22 Exam(s): CTA CHEST EXAM:   CT Angiography Chest With Intravenous Contrast CLINICAL HISTORY:      Concern for acute aortic syndrome. TECHNIQUE:   Axial computed tomographic angiography images of the chest with intravenous contrast.  CTDI is 37 mGy and DLP is 934 mGy-cm.  This CT exam was performed according to the principle of ALARA (As Low As Reasonably Achievable) by using one or more of the following dose reduction techniques: automated exposure control, adjustment of the mA and or kV according to patient size, and or use of iterative reconstruction technique.   MIP reconstructed images were created and reviewed. COMPARISON:   No relevant prior studies available. FINDINGS:   Pulmonary arteries:  Unremarkable.  No pulmonary embolus.   Aorta:  Mild atherosclerosis.  No aortic aneurysm or dissection.   Lungs:  Diffuse bilateral airspace opacities are concerning for multifocal pneumonia and or aspiration.  Cannot  exclude superimposed pulmonary edema.   Pleural space:  Unremarkable.  No significant effusion.  No pneumothorax.   Heart:  Unremarkable.  No cardiomegaly.  No significant pericardial effusion.  No evidence of RV dysfunction.   Bones joints:  There are degenerative changes of the spine.  No acute fracture.   Soft tissues:  Unremarkable.   Lymph nodes:  Unremarkable.  No enlarged lymph nodes. IMPRESSION:     1.  No aortic aneurysm or dissection. 2.  No pulmonary embolus. 3.  Diffuse bilateral airspace opacities are concerning for multifocal pneumonia and or aspiration.  Cannot exclude superimposed pulmonary edema.     Electronically signed by Yuki Starr MD on 11-24-23 at 0122    XR Chest 1 View    Result Date: 11/24/2023  Patient: MARIBELL RUGGIERO  Time Out: 01:00 Exam(s): XR CXR 1 VIEW EXAM:   XR Chest, 1 View CLINICAL HISTORY:      Nausea and upper abd pain. TECHNIQUE:   Frontal view of the chest. COMPARISON:   Chest radiograph 11 20 2023 FINDINGS:   Lungs:  Significantly worsened bilateral airspace opacities.   Pleural space:  New small left pleural effusion.  No pneumothorax.   Heart:  Unremarkable.  No cardiomegaly.   Mediastinum:  Unremarkable.  Normal mediastinal contour.   Bones joints:  There are degenerative changes of the spine.  No acute fracture. IMPRESSION:     1.  Significantly worsened bilateral airspace opacities. 2.  New small left pleural effusion.     Electronically signed by Yuki Starr MD on 11-24-23 at 0100    XR Chest 1 View    Result Date: 11/20/2023  XR CHEST 1 VW-  HISTORY: Female who is 83 years-old, hypoxemia  TECHNIQUE: Frontal views of the chest  COMPARISON: 11/8/2023  FINDINGS: The heart size is borderline. Aorta is calcified. Mild prominence of vascular interstitial markings. Previous right pneumothorax is no longer seen, may be resolved or obscured. Infiltrative opacities in the right lung appear partially improved, continued follow-up recommended. Small likely atelectasis or  scarring at the left midlung. No large pleural effusion, or left pneumothorax. No acute osseous process.      Partial improvement.    This report was finalized on 11/20/2023 10:27 AM by Dr. Reilly Ritter M.D on Workstation: [id number]      XR Chest 1 View    Result Date: 11/9/2023  Portable chest radiograph  HISTORY: Pneumothorax  TECHNIQUE: Single AP portable radiograph of the chest  COMPARISON: Chest radiograph 11/8/2023      FINDINGS AND IMPRESSION: Previously seen right pneumothorax has decreased in size resulting in approximately 1.6 cm in pleural-parenchymal separation (previously 2.8 cm). There is moderate pulmonary opacification throughout bilateral lungs, right greater than left, suggestive of multifocal pneumonia and/or pulmonary edema in the appropriate clinical context and correlation with patient history is recommended with follow-up chest CT if clinically indicated. Given the somewhat masslike opacification overlying the right lung, at least continued attention on follow-up chest radiograph in 4 to 6 weeks is recommended to ensure appropriate evolution/resolution and exclude any possibility of neoplasm.  Cardiac silhouette is within normal its for size.  This report was finalized on 11/9/2023 1:13 AM by Dr. Cuba Lieberman M.D on Workstation: [id number]      CT Guided Chest Tube    Result Date: 11/8/2023  LIMITED CT CHEST  HISTORY:  right pneumothorax  COMPARISON: Chest x-ray earlier same day. CT  11/04/2023.  FINDINGS: After informed written consent was obtained with a witness present, the patient was placed in supine position. A planning CT scan of the chest was performed for anticipated chest tube placement. Only a relatively small right pneumothorax was seen at this time, potentially decreased from preceding chest x-ray. Images were reviewed with Dr. Alford who decided that chest tube placement was not warranted at this time. Compared to the recent CT a peripherally extending consolidated area in the  right upper lobe was present, consistent with bronchoscopy procedure related hemorrhage/hematoma. Partially obstructing opacity in left lower lobe bronchus could represent blood clot with associated consolidations/atelectasis in the left lung base. Less pronounced right lower lobe opacities could also be related to bleeding. Redemonstrated scattered bilateral groundglass opacities not directly comparable secondary to technique, worsening not excluded. New trace bilateral pleural effusions. All elements of maximal sterile technique were followed. Radiation dose reduction techniques were utilized, including automated exposure control and exposure modulation based on body size.       Small right pneumothorax, no chest tube placed at this time. See above for all findings.   This report was finalized on 11/8/2023 5:07 PM by Dr. Bruno Haider M.D on Workstation: [id number]      XR Chest PA & Lateral    Result Date: 11/8/2023  XR CHEST PA AND LATERAL-  HISTORY: Female who is 83 years-old, check for pneumothorax status post bronchoscopy  TECHNIQUE: Frontal and lateral views of the chest  COMPARISON: 9/27/2023  FINDINGS: The heart size is borderline. Aorta is calcified. Pulmonary vasculature is congested. Extensive patchy opacities in both lungs may be edema and/or pneumonia. Dense consolidation in the right upper lobe, about 6.3 cm, may represent postbiopsy hemorrhage. Right apical pneumothorax measures about 3.1 cm, about 20% right pneumothorax. No pleural effusion, or left pneumothorax. No acute osseous process.      Critical test result. Right pneumothorax, as described. Dense consolidation in the right upper lobe may represent postbiopsy hemorrhage. Extensive patchy opacities may be edema and/or pneumonia.  Discussed by telephone with patient's nurse, Ismael, at time of interpretation, 1408, 11/8/2023  This report was finalized on 11/8/2023 2:15 PM by Dr. Reilly Ritter M.D on Workstation: [id number]      XR Chest 1  View    Result Date: 11/8/2023  XR CHEST 1 VW-, FL C ARM DURING SURGERY-  HISTORY: Female who is 83 years-old, bronchoscopy, biopsies   TECHNIQUE: FLUOROSCOPIC ASSISTANCE IN THE OPERATING ROOM.  FINDINGS:  1 intraoperative fluoroscopic spot view was obtained and recorded the PACS for review, revealing right-sided bronchoscopy. Please see operative report for full details.  Fluoroscopy time: 36.1 seconds  Radiation exposure: Dose area product: 1.6852 Gy x cm(2)        As described.  This report was finalized on 11/8/2023 11:47 AM by Dr. Reilly Ritter M.D on Workstation: [id number]      FL C Arm During Surgery    Result Date: 11/8/2023  XR CHEST 1 VW-, FL C ARM DURING SURGERY-  HISTORY: Female who is 83 years-old, bronchoscopy, biopsies   TECHNIQUE: FLUOROSCOPIC ASSISTANCE IN THE OPERATING ROOM.  FINDINGS:  1 intraoperative fluoroscopic spot view was obtained and recorded the PACS for review, revealing right-sided bronchoscopy. Please see operative report for full details.  Fluoroscopy time: 36.1 seconds  Radiation exposure: Dose area product: 1.6852 Gy x cm(2)        As described.  This report was finalized on 11/8/2023 11:47 AM by Dr. Reilly Ritter M.D on Workstation: [id number]      CT Chest Without Contrast Diagnostic    Result Date: 11/4/2023  CT CHEST WO CONTRAST DIAGNOSTIC-  INDICATIONS: Lung mass  TECHNIQUE: Radiation dose reduction techniques were utilized, including automated exposure control and exposure modulation based on body size. Unenhanced CHEST CT  COMPARISON: 5/7/2023  FINDINGS:    The heart size is borderline without pericardial effusion. Prominent coronary arterial calcifications are apparent. Ascending aorta is dilated, 3.8 cm, not significantly changed. A few small subcentimeter short axis mediastinal lymph nodes are seen that are not significant by size criteria. Assessment of vascular, mediastinal, and hilar structures is limited without intravenous contrast material. Debris is seen in the  esophagus.  The airways appear clear.  No pleural effusion or pneumothorax.  The lungs show again show a pleural-based density in the lingula, axial image 58, also present the abdomen/pelvis CT from 11/2/2023 (and appears similar apart from increased extension medially), but representing a change from the chest CT from 5/7/2023, may be rounded atelectasis, pneumonia, or neoplasm. Since the recent abdomen/pelvis CT, consolidation has developed anteriorly laterally in the left lower lobe suspicious for pneumonia. Fairly extensive groundglass and consolidative opacities are present throughout both lungs, especially mid to upper lungs that may represent edema and/or atypical pneumonia.   Upper abdominal structures show no acute findings. Gallbladder is surgically absent. 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 Mild nonspecific thickening of the adrenal glands.  Degenerative changes are seen in the spine, shoulders. No acute fracture is identified.       Redemonstration of pleural-based density in the lingula, showing increased extension medially, representing a change from the chest CT from 5/7/2023, may be rounded atelectasis, pneumonia, or neoplasm; correlate clinically, PET/CT correlation can be obtained as indicated, in addition to continued CT follow-up. Since the recent abdomen/pelvis CT, consolidation has developed anteriorly laterally in the left lower lobe suspicious for pneumonia. Fairly extensive groundglass and consolidative opacities are present throughout both lungs, especially mid to upper lungs that may represent edema and/or atypical pneumonia    This report was finalized on 11/4/2023 9:17 AM by Dr. Reilly Ritter M.D on Workstation: [id number]      CT Abdomen Pelvis Without Contrast    Result Date: 11/2/2023  CT ABDOMEN PELVIS WO CONTRAST-  INDICATIONS: Abdominal pain  TECHNIQUE: Radiation dose reduction techniques were utilized, including automated exposure control and exposure modulation based on  body size. Unenhanced ABDOMEN AND PELVIS CT  COMPARISON: 8/13/2023  FINDINGS:  The gallbladder is surgically absent.  Mild nonspecific nodular thickening of the adrenal glands is seen.  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 A small arterial calcification is apparent at the left renal hilum. No hydronephrosis.  Otherwise unremarkable unenhanced appearance of the liver, spleen, adrenal glands, pancreas, kidneys, bladder.  No bowel obstruction or abnormal bowel thickening is identified. Colonic diverticula are seen that do not appear inflamed. Minimal hiatal hernia is seen. Pelvic floor relaxation/rectocele is apparent, correlate with clinical exam.  No free intraperitoneal gas or free fluid. Umbilical hernia of fat is noted.  Scattered small mesenteric and para-aortic lymph nodes are seen that are not significant by size criteria.  Abdominal aorta is not aneurysmal. Aortic and other arterial calcifications are present.  The lung bases show a new pleural-based density in the lingula, 1.3 x 3.3 cm on axial image 8, that could be rounded atelectasis or potentially a focus of infection or even neoplasm, correlate clinically, PET/CT correlation can be obtained as indicated, in addition to continued CT follow-up. Reticulonodular opacities in the lower lobes may be inflammatory/infectious in etiology, follow-up recommended.  Degenerative changes are seen in the spine. Stable sclerotic foci in the left aspect of the sacrum, right femur, nonspecific, may represent bone island. No acute fracture is identified.          1. A new indeterminate density in the lingula, as described. Reticulonodular pulmonary opacities in the bilateral lower lobes. Follow-up evaluation advised.  2. Colonic diverticulosis. No acute inflammatory process of bowel is identified. Follow-up as indications persist.  3. No urolithiasis or hydronephrosis. Small arterial calcification of the left renal hilum.  This report was finalized on 11/2/2023 7:22 PM  by Dr. Reilly Ritter M.D on Workstation: [id number]      Results for orders placed during the hospital encounter of 11/23/23    Adult Transthoracic Echo Complete W/ Cont if Necessary Per Protocol    Interpretation Summary    Left ventricular systolic function is normal. Calculated left ventricular EF = 55.3% Normal left ventricular cavity size noted. Left ventricular wall thickness is consistent with moderate concentric hypertrophy. All left ventricular wall segments contract normally. Left ventricular diastolic function is consistent with (grade II w/high LAP) pseudonormalization.    The left atrial cavity is moderately dilated. Left atrial volume is underestimated  The right atrial cavity is moderately dilated.    The aortic valve is abnormal in structure. There is severe thickening of the non-coronary cusp(s) of the aortic valve. There is a mobile undulating mass on what appears to be the ventricular surface of the aortic valve though a clear attachment is not visualized. It is highly suspicious for vegetation. Cannot determine if this is a bileaflet or trileaflet valve Trace aortic valve regurgitation is present. Mild aortic valve stenosis is present.    There is moderate, bileaflet mitral valve thickening present. Mild to moderate mitral valve regurgitation is present. Mild mitral valve stenosis is present. The mean mitral valve gradient is 5 mm Hg at a heart rate of 97 bpm    Trace tricuspid valve regurgitation is present. Estimated right ventricular systolic pressure from tricuspid regurgitation is moderately elevated (45-55 mmHg). Calculated right ventricular systolic pressure from tricuspid regurgitation is 45 mmHg.    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         Assessment & Plan     Acute hypoxic respiratory failure slowly improving she feels her breathing is better certainly does not look in distress like she did yesterday  Acute heart failure preserved ejection fraction continue diuresis per cardiology  MRSA bacteremia antibiotics per infectious disease  Aortic valve thickening/undulating mobile mass concerning for vegetation 9/22 felt to be a [place] vegetation per cardiology no change in echo appearance  Myeloproliferative disorder JAK2 positive hematology following  Pulmonary hypertension by echocardiogram severe  Coronary artery disease recent stent cath today no acute stenoses  Diabetes mellitus type 2    Plan for disposition:    Gualberto Luther Jr, MD  11/30/23  17:34 EST    Time:

## 2023-11-30 NOTE — THERAPY EVALUATION
Patient Name: Catherine Boyer  : 1940    MRN: [id number]                              Today's Date: 2023       Admit Date: 2023    Visit Dx:     ICD-10-CM ICD-9-CM   1. Community acquired pneumonia, unspecified laterality  J18.9 486   2. Hypoxia  R09.02 799.02   3. Septic shock  A41.9 038.9    R65.21 785.52     995.92     Patient Active Problem List   Diagnosis    Acute on chronic diastolic heart failure    CAD (coronary artery disease)    Nonbacterial thrombotic endocarditis    Paroxysmal atrial fibrillation    Myeloproliferative disorder    Microcytic anemia    Type 2 diabetes mellitus with circulatory disorder, without long-term current use of insulin    GERD (gastroesophageal reflux disease)    Hypertension    Personal history of transient ischemic attack (TIA), 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 deficits    Porcelain gallbladder    Breast cancer    Atherosclerosis of abdominal aorta    APC (atrial premature contractions)    Moderate malnutrition    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    Elevated troponin    TIA (transient ischemic attack)    PUD (peptic ulcer disease)    Anemia    Tachycardia    Myelofibrosis    Normocytic anemia    Acute kidney injury    Pulmonary infiltrate    Iatrogenic pneumothorax    CAP (community acquired pneumonia)    MRSA bacteremia     Past Medical History:   Diagnosis Date    Atherosclerosis of abdominal aorta 2023    Atrial fibrillation     Breast cancer     CAD (coronary artery disease)     NSTEMI 2022: 90% ostial LAD, 99% D1, 70% mid-distal LAD (medical therapy). She received two stents (2.5x18 and 2.5x26mm [place] LEFTY) but I don't know which one went to which lesion.    Carotid atherosclerosis     Cholecystitis 2022    Added automatically from request for surgery [id number]    Chronic diastolic (congestive) heart failure     COVID 10/29/2022    GERD (gastroesophageal reflux disease)     Glaucoma     History of cataract     Hypertension     Microcytic  anemia     per Dr. oleg pate office  note 6/30/22-dd    Myeloproliferative disorder     JAK2 positive    Nonbacterial thrombotic endocarditis     6/2021: 4x5mm vegetation on the ventricular surface of the anterior MV, negative blood cultures    Porcelain gallbladder     PUD (peptic ulcer disease)     TIA (transient ischemic attack)     Type 2 diabetes mellitus     Type 2 diabetes mellitus with circulatory disorder, without long-term current use of insulin 07/14/2022    Upper GI bleed      Past Surgical History:   Procedure Laterality Date    BREAST LUMPECTOMY  1999    BRONCHOSCOPY Bilateral 11/8/2023    Procedure: BRONCHOSCOPY UNDER FLUORO WITH BAL,  BIOPSIES; ACETYLCYSTEIN 4ML GIVEN;  Surgeon: Raoul Alford MD;  Location: [facility] ENDOSCOPY;  Service: Pulmonary;  Laterality: Bilateral;  PRE- PULMONARY INFILTRATES  POST- SAME    CARDIAC CATHETERIZATION N/A 09/02/2022    Procedure: Coronary angiography;  Surgeon: Guero Verde MD;  Location: [facility] CATH INVASIVE LOCATION;  Service: Cardiovascular;  Laterality: N/A;    CARDIAC CATHETERIZATION N/A 09/02/2022    Procedure: Stent LEFTY coronary;  Surgeon: Guero Verde MD;  Location: [facility] CATH INVASIVE LOCATION;  Service: Cardiovascular;  Laterality: N/A;    CARDIAC CATHETERIZATION N/A 11/29/2023    Procedure: Coronary angiography;  Surgeon: Shari De La Paz MD;  Location: [facility] CATH INVASIVE LOCATION;  Service: Cardiology;  Laterality: N/A;    CATARACT EXTRACTION  2011    CHOLECYSTECTOMY      CHOLECYSTECTOMY WITH INTRAOPERATIVE CHOLANGIOGRAM N/A 11/28/2022    Procedure: CHOLECYSTECTOMY LAPAROSCOPIC INTRAOPERATIVE CHOLANGIOGRAM;  Surgeon: Dani Grover Jr., MD;  Location: [facility] MAIN OR;  Service: General;  Laterality: N/A;    COLONOSCOPY N/A 02/09/2023    Procedure: COLONOSCOPY TO CECUM & T.I.;  Surgeon: Guero Ferris MD;  Location: [facility] ENDOSCOPY;  Service: Gastroenterology;  Laterality: N/A;  PRE- MELENA, GI BLEED  POST- DIVERTICULOSIS, INT  HEMORRHOIDS    ENDOSCOPY N/A 01/25/2023    Procedure: ESOPHAGOGASTRODUODENOSCOPY WITH BIOPSIES;  Surgeon: Charles Diaz MD;  Location: [facility] ENDOSCOPY;  Service: Gastroenterology;  Laterality: N/A;  pre: abd pain, nausea  post: hiatal hernia, mild gastritis, sloughing of the esophagus    ENTEROSCOPY SMALL BOWEL N/A 02/09/2023    Procedure: ENTEROSCOPY SMALL BOWEL;  Surgeon: Guero Ferris MD;  Location: [facility] ENDOSCOPY;  Service: Gastroenterology;  Laterality: N/A;  PRE- MELENA, GI BLEED  POST- HIATAL HERNIA    JOINT REPLACEMENT      UPPER GASTROINTESTINAL ENDOSCOPY        General Information       Row Name 11/30/23 1608          Physical Therapy Time and Intention    Document Type evaluation  -EM     Mode of Treatment individual therapy;physical therapy  -EM       Row Name 11/30/23 1608          General Information    Patient Profile Reviewed yes  -EM     Prior Level of Function independent:  uses rollator in assisted living at baseline, recent hospitalization and was d/c to SNU before returning to hospital  -EM     Existing Precautions/Restrictions fall;oxygen therapy device and L/min  pt on heated high flow o2  -EM       Row Name 11/30/23 1608          Living Environment    People in Home facility resident  admitted from U, normally resides in assisted living  -EM       Row Name 11/30/23 1608          Cognition    Orientation Status (Cognition) oriented x 3  -EM       Row Name 11/30/23 1608          Safety Issues, Functional Mobility    Impairments Affecting Function (Mobility) endurance/activity tolerance;strength  -EM               User Key  (r) = Recorded By, (t) = Taken By, (c) = Cosigned By      Initials Name Provider Type    EM Ila Arredondo, PT Physical Therapist                   Mobility       Row Name 11/30/23 1610          Bed Mobility    Supine-Sit [place] (Bed Mobility) minimum assist (75% patient effort)  -EM     Sit-Supine [place] (Bed Mobility) minimum assist (75% patient  effort)  -EM     Assistive Device (Bed Mobility) head of bed elevated  -EM       Row Name 11/30/23 1610          Sit-Stand Transfer    Sit-Stand [place] (Transfers) maximum assist (25% patient effort)  -EM     Comment, (Sit-Stand Transfer) attempted standing at the bedside but patient unable to clear buttocks off bed  -EM               User Key  (r) = Recorded By, (t) = Taken By, (c) = Cosigned By      Initials Name Provider Type    EM Ila Arredondo PT Physical Therapist                   Obj/Interventions       Row Name 11/30/23 1611          Range of Motion Comprehensive    General Range of Motion no range of motion deficits identified  -EM       Row Name 11/30/23 1611          Strength Comprehensive (MMT)    General Manual Muscle Testing (MMT) Assessment other (see comments)  -EM     Comment, General Manual Muscle Testing (MMT) Assessment no focal deficits identified  -EM       Row Name 11/30/23 1611          Motor Skills    Therapeutic Exercise other (see comments)  10 reps AP, LAQ  -EM       Row Name 11/30/23 1611          Balance    Static Sitting Balance standby assist  -EM     Dynamic Sitting Balance standby assist  -EM       Row Name 11/30/23 1611          Sensory Assessment (Somatosensory)    Sensory Assessment (Somatosensory) sensation intact  -EM               User Key  (r) = Recorded By, (t) = Taken By, (c) = Cosigned By      Initials Name Provider Type    Ila Beyer PT Physical Therapist                   Goals/Plan       Row Name 11/30/23 1621          Bed Mobility Goal 1 (PT)    Activity/Assistive Device (Bed Mobility Goal 1, PT) bed mobility activities, all  -EM     [place] Level/Cues Needed (Bed Mobility Goal 1, PT) contact guard required  -EM     Time Frame (Bed Mobility Goal 1, PT) 1 week  -EM       Row Name 11/30/23 1621          Transfer Goal 1 (PT)    Activity/Assistive Device (Transfer Goal 1, PT) sit-to-stand/stand-to-sit;walker, rolling  -EM     [place]  Level/Cues Needed (Transfer Goal 1, PT) moderate assist (50-74% patient effort)  -EM     Time Frame (Transfer Goal 1, PT) 1 week  -EM       Row Name 11/30/23 1621          Therapy Assessment/Plan (PT)    Planned Therapy Interventions (PT) bed mobility training;home exercise program;patient/family education;transfer training  -EM               User Key  (r) = Recorded By, (t) = Taken By, (c) = Cosigned By      Initials Name Provider Type    EM Ila Arredondo, PT Physical Therapist                   Clinical Impression       Row Name 11/30/23 1613          Pain    Pretreatment Pain Rating 0/10 - no pain  -EM       Row Name 11/30/23 1613          Plan of Care Review    Plan of Care Reviewed With patient  -EM     Outcome Evaluation Pt is 82 yo female admitted to [facility] for sepsis, pna. PMH significant for HTN, HLD, myeloproliferative disorder, DM, afib, CHF, chronic respiratory failure, recent hospitalization for UTI and renal failure. Patient lives in assisted living, uses rollator at baseline, went to SNU after previous hospitalization. Today, patient on heated high flow o2, agreeable to therapy, c/o fatigue. Patient performed bed mobility with Kvng, unable to clear buttocks off bed with sit to stand attempt. Pt demonstrates impairments consisting of generalized weakness, decreased activity tolerance and may benefit from skilled PT. d/c plans are to return to SNU.  -EM       Row Name 11/30/23 1613          Therapy Assessment/Plan (PT)    Patient/Family Therapy Goals Statement (PT) get stronger  -EM     Rehab Potential (PT) fair, will monitor progress closely  -EM     Criteria for Skilled Interventions Met (PT) yes;skilled treatment is necessary  -EM     Therapy Frequency (PT) 5 times/wk  -EM       Row Name 11/30/23 1613          Vital Signs    Pre SpO2 (%) 96  -EM     O2 Delivery Pre Treatment hi-flow  heated hi-flow  -EM     Post SpO2 (%) 97  -EM     O2 Delivery Post Treatment hi-flow  -EM       Row Name 11/30/23  1613          Positioning and Restraints    Pre-Treatment Position in bed  -EM     Post Treatment Position bed  -EM     In Bed supine;call light within reach;exit alarm on;with family/caregiver;notified nsg  -EM               User Key  (r) = Recorded By, (t) = Taken By, (c) = Cosigned By      Initials Name Provider Type    Ila Beyer PT Physical Therapist                   Outcome Measures       Row Name 11/30/23 1621          How much help from another person do you currently need...    Turning from your back to your side while in flat bed without using bedrails? 3  -EM     Moving from lying on back to sitting on the side of a flat bed without bedrails? 3  -EM     Moving to and from a bed to a chair (including a wheelchair)? 1  -EM     Standing up from a chair using your arms (e.g., wheelchair, bedside chair)? 1  -EM     Climbing 3-5 steps with a railing? 1  -EM     To walk in hospital room? 1  -EM     AM-PAC 6 Clicks Score (PT) 10  -EM     Highest Level of Mobility Goal 4 --> Transfer to chair/commode  -EM               User Key  (r) = Recorded By, (t) = Taken By, (c) = Cosigned By      Initials Name Provider Type    Ila Beyer PT Physical Therapist                                 Physical Therapy Education       Title: PT OT SLP Therapies (In Progress)       Topic: Physical Therapy (In Progress)       Point: Mobility training (Done)       Learning Progress Summary             Patient Acceptance, E, VU by EM at 11/30/2023 1622   Family Acceptance, E, VU by EM at 11/30/2023 1622                         Point: Home exercise program (Not Started)       Learner Progress:  Not documented in this visit.              Point: Body mechanics (Not Started)       Learner Progress:  Not documented in this visit.              Point: Precautions (Not Started)       Learner Progress:  Not documented in this visit.                              User Key       Initials Effective Dates Name Provider Type  Discipline    EM 06/16/21 -  Ila Arredondo PT Physical Therapist PT                  PT Recommendation and Plan  Planned Therapy Interventions (PT): bed mobility training, home exercise program, patient/family education, transfer training  Plan of Care Reviewed With: patient  Outcome Evaluation: Pt is 84 yo female admitted to [facility] for sepsis, pna. PMH significant for HTN, HLD, myeloproliferative disorder, DM, afib, CHF, chronic respiratory failure, recent hospitalization for UTI and renal failure. Patient lives in assisted living, uses rollator at baseline, went to SNU after previous hospitalization. Today, patient on heated high flow o2, agreeable to therapy, c/o fatigue. Patient performed bed mobility with Kvng, unable to clear buttocks off bed with sit to stand attempt. Pt demonstrates impairments consisting of generalized weakness, decreased activity tolerance and may benefit from skilled PT. d/c plans are to return to SNU.     Time Calculation:         PT Charges       Row Name 11/30/23 1622             Time Calculation    Start Time 1540  -EM      Stop Time 1557  -EM      Time Calculation (min) 17 min  -EM      PT Received On 11/30/23  -EM      PT - Next Appointment 12/01/23  -EM      PT Goal Re-Cert Due Date 12/07/23  -EM         Time Calculation- PT    Total Timed Code Minutes- PT 10 minute(s)  -EM         Timed Charges    10284 - PT Therapeutic Exercise Minutes 4  -EM      52002 - PT Therapeutic Activity Minutes 6  -EM         Total Minutes    Timed Charges Total Minutes 10  -EM       Total Minutes 10  -EM                User Key  (r) = Recorded By, (t) = Taken By, (c) = Cosigned By      Initials Name Provider Type    EM Ila Arredondo, PT Physical Therapist                  Therapy Charges for Today       Code Description Service Date Service Provider Modifiers Qty    78429999760 HC PT THERAPEUTIC ACT EA 15 MIN 11/30/2023 Ila Arredondo, PT GP 1    52641410955 HC PT EVAL MOD COMPLEXITY 2  11/30/2023 Ila Arredondo, PT GP 1            PT G-Codes  Outcome Measure Options: AM-PAC 6 Clicks Daily Activity (OT)  AM-PAC 6 Clicks Score (PT): 10  AM-PAC 6 Clicks Score (OT): 12  PT Discharge Summary  Anticipated Discharge Disposition (PT): skilled nursing facility    Ila Arredondo, PT  11/30/2023

## 2023-11-30 NOTE — PLAN OF CARE
Goal Outcome Evaluation:  Plan of Care Reviewed With: patient           Outcome Evaluation: Pt is 84 yo female admitted to [facility] for sepsis, pna. PMH significant for HTN, HLD, myeloproliferative disorder, DM, afib, CHF, chronic respiratory failure, recent hospitalization for UTI and renal failure. Patient lives in assisted living, uses rollator at baseline, went to SNU after previous hospitalization. Today, patient on heated high flow o2, agreeable to therapy, c/o fatigue. Patient performed bed mobility with Kvng, unable to clear buttocks off bed with sit to stand attempt. Pt demonstrates impairments consisting of generalized weakness, decreased activity tolerance and may benefit from skilled PT. d/c plans are to return to SNU.      Anticipated Discharge Disposition (PT): skilled nursing facility

## 2023-11-30 NOTE — PLAN OF CARE
Goal Outcome Evaluation:  Plan of Care Reviewed With: patient      Outcome Evaluation: Patient A/ O x4. Pleasant and cooperative.Heated high flow to maintain O2 sats >90%. no c/o pain this shift. Worked with PT. Family at bedside attentive to patient. IV Mag infused, per order. IV Lasix given. Vanc infused. Safety maintained.

## 2023-11-30 NOTE — PROGRESS NOTES
Name: Catherine Boyer ADMIT: 2023   : 1940  PCP: Brett Jackson MD    MRN: [id number] LOS: 6 days   AGE/SEX: 83 y.o. female  ROOM: [facility]     Subjective     In bed, son at bedside/supportive. She remains on high flow. States she feels ok today. Getting laisx and steroids. CXR stable with bilateral infiltrates.     Objective   Objective   Vital Signs  Temp:  [97.9 °F (36.6 °C)-98.6 °F (37 °C)] 98.6 °F (37 °C)  Heart Rate:  [] 98  Resp:  [16-18] 18  BP: ()/(48-83) 113/67  SpO2:  [91 %-99 %] 95 %  on  Flow (L/min):  [] 50;   Device (Oxygen Therapy): heated;humidified;high-flow nasal cannula  Body mass index is 24.91 kg/m².  Physical Exam  Constitutional:       General: She is in acute distress.      Appearance: She is ill-appearing and toxic-appearing.   HENT:      Head: Normocephalic and atraumatic.   Cardiovascular:      Rate and Rhythm: Normal rate and regular rhythm.   Pulmonary:      Effort: Respiratory distress (slight, decreased bs at bases) present.   Abdominal:      General: There is no distension.      Palpations: Abdomen is soft.      Tenderness: There is no abdominal tenderness.   Skin:     Coloration: Skin is pale.   Neurological:      Mental Status: She is alert.         Results Review     I reviewed the patient's new clinical results.  Results from last 7 days   Lab Units 23  0505 23  0950   WBC 10*3/mm3 78.89* 97.19* 98.09* 98.25*   HEMOGLOBIN g/dL 8.4* 7.1* 7.4* 7.5*   PLATELETS 10*3/mm3 285 273 268 300     Results from last 7 days   Lab Units 23  0505 23  0950   SODIUM mmol/L 139 134* 135* 135*   POTASSIUM mmol/L 4.0 4.7 4.7 4.0   CHLORIDE mmol/L 96* 96* 97* 95*   CO2 mmol/L 24.9 22.0 24.0 26.5   BUN mg/dL 21 20 18 15   CREATININE mg/dL 0.93 0.80 0.80 0.83   GLUCOSE mg/dL 202* 69 80 69   Estimated Creatinine Clearance: 38.3 mL/min (by C-G formula based on SCr of 0.93  "mg/dL).  Results from last 7 days   Lab Units 11/30/23  0421 11/25/23  0246 11/24/23  0736 11/24/23  0002   ALBUMIN g/dL 3.0* 3.5 4.0 3.7   BILIRUBIN mg/dL 0.9 0.8 1.1 1.0   ALK PHOS U/L 155* 190* 265* 209*   AST (SGOT) U/L 15 15 20 28   ALT (SGPT) U/L 12 18 24 24     Results from last 7 days   Lab Units 11/30/23  0421 11/29/23  0427 11/28/23  0505 11/27/23  0950 11/26/23  0338 11/25/23  0246 11/24/23  0736 11/24/23  0002   CALCIUM mg/dL 7.6* 8.0* 8.7 8.4*   < > 8.0* 9.2 8.7   ALBUMIN g/dL 3.0*  --   --   --   --  3.5 4.0 3.7   MAGNESIUM mg/dL 1.7  --   --   --   --  1.9  --  1.7   PHOSPHORUS mg/dL  --   --   --   --   --  4.3  --   --     < > = values in this interval not displayed.     Results from last 7 days   Lab Units 11/25/23  0246 11/24/23  1922 11/24/23  1315 11/24/23  1042 11/24/23  0241 11/24/23  0002   PROCALCITONIN ng/mL 0.14  --   --   --   --  0.25   LACTATE mmol/L 4.8* 6.0* 4.2* 5.2*   < >  --     < > = values in this interval not displayed.     COVID19   Date Value Ref Range Status   11/24/2023 Not Detected Not Detected - Ref. Range Final   11/08/2023 Not Detected Not Detected - Ref. Range Final     No results found for: \"HGBA1C\", \"POCGLU\"  Results for orders placed or performed during the hospital encounter of 11/23/23   Blood Culture - Blood, Arm, Left    Specimen: Arm, Left; Blood   Result Value Ref Range    Blood Culture No growth at 4 days          XR Chest 1 View  Narrative: XR CHEST 1 VW-     HISTORY: Female who is 83 years-old, dyspnea     TECHNIQUE: Frontal view of the chest     COMPARISON: 11/29/2023     FINDINGS: The heart size is borderline. Extensive bilateral infiltrates  appear similar to prior exam. There may be mild left pleural effusion.  No pneumothorax. No acute osseous process.     Impression: Extensive bilateral infiltrates appear similar to prior  exam.           This report was finalized on 11/30/2023 1:35 PM by Dr. Reilly Ritter M.D on Workstation: [id number]     Cardiac " Catheterization/Vascular Study  CONCLUSION:  1.  Stable coronary artery disease:  Mild to moderate nonobstructive disease of the LAD and left circumflex   arteries  *Patent diagonal branch stent with no significant in-stent restenosis  *100% chronic total occlusion of the distal right coronary artery with   left-to-right collateral filling    RECOMMENDATIONS:  Continue medical management of coronary artery disease.    FINDINGS:  SELECTIVE CORONARY ANGIOGRAMS:  1.  Left main artery: Large-caliber vessel with 0% stenosis.  The vessel   bifurcates into left anterior descending and left circumflex arteries.  2.  Left anterior descending artery: Moderate caliber vessel with 0%   proximal stenosis.  Proximal vessel gives rise to a moderate caliber first   diagonal branch.  There is a stent extending from the proximal to the mid   vessel that is widely patent with no significant in-stent restenosis.  The   ostium of the diagonal branch has a stable appearing 50% stenosis.  The   mid to distal LAD following the origin of the diagonal branch is a very   small caliber vessel with stable appearing 40 to 50% mid stenosis.  3.  Left circumflex artery: Proximally a large caliber vessel with 0%   stenosis.  Gives rise to a moderate caliber first obtuse marginal branch   with no significant disease.  The mid circumflex tapers to a moderate   caliber vessel with no significant disease and gives rise to a moderate   caliber second obtuse marginal branch with 30 to 40% proximal stenosis.    The distal continuation circumflex vessel tapers to small caliber with no   significant stenosis.  4.  Right coronary artery: Proximally large-caliber vessel.  There is   diffuse severe calcification extending from the proximal through the   distal vessel.  The distal vessel has a chronic 100% total occlusion.    There is left-to-right collateral filling of the posterior descending and   posterolateral branch.    PROCEDURE:  After informed consent  was obtained the patient is brought to the cardiac   catheterization laboratory where her right wrist was prepped and draped in   a sterile manner.  1 mL of 2% lidocaine was infused subcutaneously to the   right wrist.  Access to the right radial artery is obtained using a   micropuncture needle followed by placement of a 5/6 Estonian slender glide   sheath.    Had some difficulty advancing a J-wire through the proximal radial artery.    Was able to advance a BMW wire easily and advance the diagnostic catheter   over this wire.  At the innominate artery there was severe tortuosity and   difficulty advancing into the ascending aorta with a J-wire.  It exchanged   out for a Glidewire which I was able to advance into the ascending aorta.    The remainder of the procedure was performed without any significant   issues.    Selective coronary angiogram to perform using a 5 Estonian FL 3.5 and a 5   Estonian FR4 diagnostic catheters.  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    Following clinician procedure all wires and catheters were removed.    Radial sheath was removed and a TR band placed.  After hemostasis was   achieved the patient was transferred to the recovery area in stable   condition.    Scheduled Medications  aspirin, 81 mg, Oral, Daily  Eucerin original healing lotion, , Topical, Q12H  furosemide, 40 mg, Intravenous, Once  furosemide, 40 mg, Intravenous, Q8H  lactated ringers, 1,000 mL, Intravenous, Once  Menthol-Zinc Oxide, 1 application , Topical, Q12H  metoprolol succinate XL, 25 mg, Oral, Daily  Momelotinib Dihydrochloride, 200 mg, Oral, Q24H  pantoprazole, 40 mg, Oral, Q AM  predniSONE, 15 mg, Oral, Daily With Breakfast   Followed by  [START ON 12/6/2023] predniSONE, 10 mg, Oral, Daily With Breakfast   Followed by  [START ON 12/13/2023] predniSONE, 5 mg, Oral, Daily With Breakfast  senna-docusate sodium, 2 tablet, Oral, BID  sertraline, 100 mg, Oral,  Daily  sodium chloride, 10 mL, Intravenous, Q12H  tiotropium bromide monohydrate, 2 puff, Inhalation, Daily - RT  vancomycin, 1,000 mg, Intravenous, Q24H    Infusions  Pharmacy to dose vancomycin,     Diet  Diet: Regular/House Diet; Texture: Regular Texture (IDDSI 7); Fluid Consistency: Thin (IDDSI 0)       Assessment/Plan     Active Hospital Problems    Diagnosis  POA    **MRSA bacteremia [R78.81, B95.62]  Yes    CAP (community acquired pneumonia) [J18.9]  Yes    Myeloproliferative disorder [D47.1]  Yes    Type 2 diabetes mellitus with circulatory disorder, 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       83 y.o. female admitted with MRSA bacteremia.    Acute hypoxic respiratory failure  Acute on chronic diastolic CHF exacerbation  Pulmonary HTN by echo  - Cardiology consulted and pt underwent cath 11/29 with patent stent  - on IV lasix    Sepsis secondary to MRSA bacteremia   Lactic Acidosis   -Her white blood cell count is elevated, though this is partially due to the myelodysplastic syndrome.    -on IV Vanc, ID following   -Cardiology has seen. ECHO without change from previous echo so no indication for any CHERI or surgery with ok valves.   -Plan for 4 weeks of IV Vanc     Myelodysplastic syndrome  -Patient takes ojjaara. Oncology following and plan to restart. Monitor WBC and anemia.   1 unit PRBC transfusion as listed above      Paroxysmal atrial fibrillation  -Continue Eliquis, metoprolol      Lung infiltrates  Status post bronchoscopy on 11/8/2023 with biopsies.  Pathology showed no evidence of malignancy.  She was previously on IV steroids for prolonged course during her previous hospitalization and has then been transitioned to oral steroids and was discharged.  Starting to taper.  Plan was to have follow up CT chest 6-8 weeks from  11/10/23 with [facility]  -Giving IV hydrocortisone x 1 as stress dose steroids with drop in BP 11/29  - pulm consulted 11/29    DM  A1c 6.6. Not currently on medications.     11/30- did confirm with patient/son that she would not want to be intubated.     Eliquis for DVT prophylaxis.  DNR/DNI  Discussed with patient and nursing staff. And family.   Anticipate discharge to SNU facility timing yet to be determined.       Chandni Luo MD  [facility]  11/30/23  17:28 EST

## 2023-11-30 NOTE — PROGRESS NOTES
Nutrition Services    Patient Name:  Catherine Boyer  YOB: 1940  MRN: [id number]  Admit Date:  11/23/2023    Assessment Date:  11/30/23    Summary: Patient assessed per follow-up protocol     Visited patient this AM who reports a very poor appetite. Patient states generally eating less than 50% of her meal. At times does have increased hunger cues but at time of meal service food can be off putting. At times experiencing N/V, last episode about days ago. NFPE completed and consistent with severe acute malnutrition. Patient willing to increase ONS to TID with meals.     Recommend:     Increase supplement to Boost Glucose Control TID (Provides 570 kcals, 48 g protein if consumed)- prefers chocolate.   Liberalize Diet to Regular with thin liquids. Given poor PO intakes, diet restrictions not needed.     CLINICAL NUTRITION ASSESSMENT      Reason for Assessment MST score 2+     Diagnosis/Problem   Worsening Respiratory Failure    Sepsis r/t MRSA bacteremia    Myelodysplastic syndrome    Lung infiltrates     Hx DM    Medical/Surgical History Past Medical History:   Diagnosis Date    Atherosclerosis of abdominal aorta 02/05/2023    Atrial fibrillation     Breast cancer     CAD (coronary artery disease)     NSTEMI 2/2022: 90% ostial LAD, 99% D1, 70% mid-distal LAD (medical therapy). She received two stents (2.5x18 and 2.5x26mm Finesse LEFTY) but I don't know which one went to which lesion.    Carotid atherosclerosis     Cholecystitis 11/22/2022    Added automatically from request for surgery [id number]    Chronic diastolic (congestive) heart failure     COVID 10/29/2022    GERD (gastroesophageal reflux disease)     Glaucoma     History of cataract     Hypertension     Microcytic anemia     per Dr. oleg pate office  note 6/30/22-dd    Myeloproliferative disorder     JAK2 positive    Nonbacterial thrombotic endocarditis     6/2021: 4x5mm vegetation on the ventricular surface of the anterior MV, negative blood  cultures    Porcelain gallbladder     PUD (peptic ulcer disease)     TIA (transient ischemic attack)     Type 2 diabetes mellitus     Type 2 diabetes mellitus with circulatory disorder, without long-term current use of insulin 07/14/2022    Upper GI bleed        Past Surgical History:   Procedure Laterality Date    BREAST LUMPECTOMY  1999    BRONCHOSCOPY Bilateral 11/8/2023    Procedure: BRONCHOSCOPY UNDER FLUORO WITH BAL,  BIOPSIES; ACETYLCYSTEIN 4ML GIVEN;  Surgeon: Raoul Alford MD;  Location: [facility] ENDOSCOPY;  Service: Pulmonary;  Laterality: Bilateral;  PRE- PULMONARY INFILTRATES  POST- SAME    CARDIAC CATHETERIZATION N/A 09/02/2022    Procedure: Coronary angiography;  Surgeon: Gureo Verde MD;  Location: [facility] CATH INVASIVE LOCATION;  Service: Cardiovascular;  Laterality: N/A;    CARDIAC CATHETERIZATION N/A 09/02/2022    Procedure: Stent LEFTY coronary;  Surgeon: Guero Verde MD;  Location: [facility] CATH INVASIVE LOCATION;  Service: Cardiovascular;  Laterality: N/A;    CARDIAC CATHETERIZATION N/A 11/29/2023    Procedure: Coronary angiography;  Surgeon: Shari De La Paz MD;  Location: [facility] CATH INVASIVE LOCATION;  Service: Cardiology;  Laterality: N/A;    CATARACT EXTRACTION  2011    CHOLECYSTECTOMY      CHOLECYSTECTOMY WITH INTRAOPERATIVE CHOLANGIOGRAM N/A 11/28/2022    Procedure: CHOLECYSTECTOMY LAPAROSCOPIC INTRAOPERATIVE CHOLANGIOGRAM;  Surgeon: Dani Grover Jr., MD;  Location: [facility] MAIN OR;  Service: General;  Laterality: N/A;    COLONOSCOPY N/A 02/09/2023    Procedure: COLONOSCOPY TO CECUM & T.I.;  Surgeon: Guero Ferris MD;  Location: [facility] ENDOSCOPY;  Service: Gastroenterology;  Laterality: N/A;  PRE- MELENA, GI BLEED  POST- DIVERTICULOSIS, INT HEMORRHOIDS    ENDOSCOPY N/A 01/25/2023    Procedure: ESOPHAGOGASTRODUODENOSCOPY WITH BIOPSIES;  Surgeon: Charles Diaz MD;  Location: [facility] ENDOSCOPY;  Service: Gastroenterology;  Laterality: N/A;  pre: abd pain, nausea  post: hiatal  "hernia, mild gastritis, sloughing of the esophagus    ENTEROSCOPY SMALL BOWEL N/A 02/09/2023    Procedure: ENTEROSCOPY SMALL BOWEL;  Surgeon: Guero Ferris MD;  Location: [facility] ENDOSCOPY;  Service: Gastroenterology;  Laterality: N/A;  PRE- MELENA, GI BLEED  POST- HIATAL HERNIA    JOINT REPLACEMENT      UPPER GASTROINTESTINAL ENDOSCOPY          Anthropometrics        Current Height  Current Weight  BMI kg/m2 Height: 157.5 cm (62\")  Weight: 61.8 kg (136 lb 3.2 oz) (11/30/23 0500)  Body mass index is 24.91 kg/m².   Adjusted BMI (if applicable)    BMI Category Normal/Healthy (18.4 - 24.9)   Ideal Body Weight (IBW) 110 lb (50.1 kg)   Usual Body Weight (UBW) Unsure   Weight Trend Noted wt loss of 2.8% since admission    Weight History Wt Readings from Last 30 Encounters:   11/30/23 0500 61.8 kg (136 lb 3.2 oz)   11/29/23 0515 64 kg (141 lb)   11/28/23 0500 64.8 kg (142 lb 13.7 oz)   11/27/23 0727 65.8 kg (145 lb)   11/27/23 0632 66.2 kg (145 lb 15.1 oz)   11/26/23 0539 60.6 kg (133 lb 8 oz)   11/25/23 0536 61 kg (134 lb 8 oz)   11/24/23 0445 61.5 kg (135 lb 9.3 oz)   11/23/23 2312 63.5 kg (140 lb)   11/21/23 0647 62.3 kg (137 lb 5.6 oz)   11/20/23 0549 61.9 kg (136 lb 7.4 oz)   11/19/23 0516 63.5 kg (140 lb)   11/18/23 0528 63.5 kg (140 lb)   11/16/23 0548 63.6 kg (140 lb 3.4 oz)   11/15/23 0600 63.7 kg (140 lb 6.9 oz)   11/14/23 0600 63.2 kg (139 lb 5.3 oz)   11/13/23 0600 66.4 kg (146 lb 6.2 oz)   11/12/23 0509 66.7 kg (147 lb)   11/11/23 0545 67.1 kg (147 lb 14.9 oz)   11/10/23 0600 67.8 kg (149 lb 7.6 oz)   11/09/23 0600 67.7 kg (149 lb 4 oz)   11/08/23 0428 67.7 kg (149 lb 4 oz)   11/07/23 0600 67.6 kg (149 lb 0.5 oz)   11/06/23 0605 67.6 kg (149 lb 0.5 oz)   11/05/23 0507 62.6 kg (138 lb)   11/04/23 0539 62.6 kg (138 lb)   11/03/23 0556 63 kg (138 lb 14.2 oz)   10/18/23 1601 67.7 kg (149 lb 4.8 oz)   10/18/23 1307 66.1 kg (145 lb 12.8 oz)   10/11/23 1225 64.4 kg (142 lb)   10/02/23 0618 64.6 kg (142 lb 6.7 oz) "   10/01/23 0525 62.8 kg (138 lb 7.2 oz)   09/30/23 0429 65.3 kg (143 lb 15.4 oz)   09/29/23 0500 66 kg (145 lb 9.6 oz)   09/28/23 0445 66.1 kg (145 lb 11.6 oz)   09/27/23 1219 68.6 kg (151 lb 3.2 oz)   09/19/23 1559 68.7 kg (151 lb 8 oz)   08/28/23 1556 66.7 kg (147 lb)   08/13/23 1508 67.1 kg (147 lb 14.4 oz)   08/13/23 1044 65.8 kg (145 lb)   08/07/23 1456 67.6 kg (149 lb)   08/02/23 0913 67.1 kg (148 lb)   06/26/23 1430 66 kg (145 lb 6.4 oz)   05/16/23 0509 60.8 kg (134 lb 0.6 oz)   05/15/23 0427 60.6 kg (133 lb 9.6 oz)   05/14/23 0537 64.1 kg (141 lb 5 oz)   05/13/23 0532 62.4 kg (137 lb 9.1 oz)   05/11/23 0500 60.6 kg (133 lb 9.6 oz)   05/10/23 0500 62 kg (136 lb 11 oz)   05/09/23 0500 64.6 kg (142 lb 6.7 oz)   05/07/23 0546 63.8 kg (140 lb 10.5 oz)   05/06/23 1050 63 kg (138 lb 14.2 oz)   05/06/23 0438 63 kg (138 lb 14.2 oz)   05/05/23 0715 63.4 kg (139 lb 12.4 oz)   05/04/23 0542 61.2 kg (134 lb 14.7 oz)   05/03/23 1540 53.5 kg (118 lb)   04/10/23 1305 65.8 kg (145 lb 1.6 oz)   03/21/23 1549 64.7 kg (142 lb 9.6 oz)   02/24/23 1309 65.2 kg (143 lb 12.8 oz)   02/15/23 1446 65.3 kg (144 lb)   02/04/23 1422 66.7 kg (147 lb)   01/23/23 0829 69.5 kg (153 lb 3.5 oz)   01/23/23 0151 68 kg (150 lb)   12/20/22 1339 69 kg (152 lb 3.2 oz)   12/19/22 1620 69.5 kg (153 lb 3.2 oz)   12/19/22 1625 69.5 kg (153 lb 3.2 oz)   11/28/22 1126 70 kg (154 lb 5.2 oz)   11/21/22 0918 70 kg (154 lb 5.2 oz)   11/21/22 0259 68 kg (150 lb)   11/21/22 0024 68 kg (150 lb)   11/18/22 0900 68.5 kg (151 lb)   11/08/22 1520 68.9 kg (151 lb 12.8 oz)   10/30/22 0801 68.9 kg (152 lb)   10/26/22 0834 70.8 kg (156 lb 1.6 oz)   10/07/22 0749 71.2 kg (157 lb)   09/27/22 1640 72.7 kg (160 lb 3.2 oz)      --  Labs       Pertinent Labs    Results from last 7 days   Lab Units 11/30/23  0421 11/29/23  0427 11/28/23  0505 11/26/23  0338 11/25/23  0246 11/24/23  0736   SODIUM mmol/L 139 134* 135*   < > 140 139   POTASSIUM mmol/L 4.0 4.7 4.7   < > 3.3* 4.1    CHLORIDE mmol/L 96* 96* 97*   < > 95* 89*   CO2 mmol/L 24.9 22.0 24.0   < > 29.0 27.2   BUN mg/dL 21 20 18   < > 22 23   CREATININE mg/dL 0.93 0.80 0.80   < > 0.86 0.91   CALCIUM mg/dL 7.6* 8.0* 8.7   < > 8.0* 9.2   BILIRUBIN mg/dL 0.9  --   --   --  0.8 1.1   ALK PHOS U/L 155*  --   --   --  190* 265*   ALT (SGPT) U/L 12  --   --   --  18 24   AST (SGOT) U/L 15  --   --   --  15 20   GLUCOSE mg/dL 202* 69 80   < > 116* 133*    < > = values in this interval not displayed.     Results from last 7 days   Lab Units 11/30/23  0421 11/26/23  0338 11/25/23  0246 11/24/23  0736 11/24/23  0002   MAGNESIUM mg/dL 1.7  --  1.9  --  1.7   PHOSPHORUS mg/dL  --   --  4.3  --   --    HEMOGLOBIN g/dL 8.4*   < > 8.5*   < > 9.4*   HEMATOCRIT % 26.2*   < > 26.3*   < > 29.3*   WBC 10*3/mm3 78.89*   < > 84.33*   < > 63.14*   ALBUMIN g/dL 3.0*  --  3.5   < > 3.7    < > = values in this interval not displayed.     Results from last 7 days   Lab Units 11/30/23  0421 11/29/23  0427 11/28/23  0505 11/27/23  0950 11/26/23  0338   PLATELETS 10*3/mm3 285 273 268 300 291     COVID19   Date Value Ref Range Status   11/24/2023 Not Detected Not Detected - Ref. Range Final     Lab Results   Component Value Date    HGBA1C 6.60 (H) 09/29/2023          Medications           Scheduled Medications aspirin, 81 mg, Oral, Daily  Eucerin original healing lotion, , Topical, Q12H  furosemide, 40 mg, Intravenous, Once  lactated ringers, 1,000 mL, Intravenous, Once  Menthol-Zinc Oxide, 1 application , Topical, Q12H  metoprolol succinate XL, 25 mg, Oral, Daily  Momelotinib Dihydrochloride, 200 mg, Oral, Q24H  pantoprazole, 40 mg, Oral, Q AM  predniSONE, 15 mg, Oral, Daily With Breakfast   Followed by  [START ON 12/6/2023] predniSONE, 10 mg, Oral, Daily With Breakfast   Followed by  [START ON 12/13/2023] predniSONE, 5 mg, Oral, Daily With Breakfast  senna-docusate sodium, 2 tablet, Oral, BID  sertraline, 100 mg, Oral, Daily  sodium chloride, 10 mL, Intravenous,  Q12H  tiotropium bromide monohydrate, 2 puff, Inhalation, Daily - RT  vancomycin, 1,000 mg, Intravenous, Q24H       Infusions Pharmacy to dose vancomycin,        PRN Medications   acetaminophen    senna-docusate sodium **AND** polyethylene glycol **AND** bisacodyl **AND** bisacodyl    Calcium Replacement - Follow Nurse / BPA Driven Protocol    hydrOXYzine    Magnesium Cardiology Dose Replacement - Follow Nurse / BPA Driven Protocol    melatonin    Morphine    nitroglycerin    ondansetron **OR** ondansetron    Pharmacy to dose vancomycin    Phosphorus Replacement - Follow Nurse / BPA Driven Protocol    Potassium Replacement - Follow Nurse / BPA Driven Protocol    sodium chloride    sodium chloride    sodium chloride    sodium chloride     Physical Findings          General Findings on oxygen therapy   Oral/Mouth Cavity tooth or teeth missing   Edema  no edema   Gastrointestinal non-distended , last bowel movement: 11/28   Skin  bruising, excoriation, MASD, other: abrasion, location of wound: L 4th toe, L plantar, L leg   Tubes/Drains/Lines none   NFPE NFPE completed, consistent with nutrition diagnosis of malnutrition using AND/ASPEN criteria. See MSA below.      Malnutrition Severity Assessment      Patient meets criteria for : Severe Malnutrition  Malnutrition Type (last 8 hours)       Malnutrition Severity Assessment       Row Name 11/30/23 1243       Malnutrition Severity Assessment    Malnutrition Type Acute Disease or Injury - Related Malnutrition      Row Name 11/30/23 1243       Insufficient Energy Intake     Insufficient Energy Intake Findings Severe    Insufficient Energy Intake  < or equal to 50% of est. energy requirement for > or equal to 5d)      Row Name 11/30/23 1243       Unintentional Weight Loss     Unintentional Weight Loss Findings Severe    Unintentional Weight Loss  Weight loss greater than 2% in one week      Row Name 11/30/23 1243       Muscle Loss    Temple Region Moderate - slight  depression    Clavicle Bone Region Moderate - some protrusion in females, visible in males    Acromion Bone Region None    Scapular Bone Region --  Supine, unable to assess    Dorsal Hand Region None    Patellar Region Severe - prominent bone, square looking, very little muscle definition    Anterior Thigh Region Severe - line/depression along thigh, obviously thin    Posterior Calf Region Severe - thin with very little definition/firmness      Row Name 11/30/23 1243       Fat Loss    Subcutaneous Fat Loss Findings None    Orbital Region  None    Upper Arm Region None    Thoracic & Lumbar Region None      Row Name 11/30/23 1243       Criteria Met (Must meet criteria for severity in at least 2 of these categories: M Wasting, Fat Loss, Fluid, Secondary Signs, Wt. Status, Intake)    Patient meets criteria for  Severe Malnutrition                       Current Nutrition Orders & Evaluation of Intake       Oral Nutrition     Food Allergies NKFA   Current PO Diet Diet: Cardiac Diets; Healthy Heart (2-3 Na+); Texture: Regular Texture (IDDSI 7); Fluid Consistency: Thin (IDDSI 0)   Supplement Boost Glucose Control daily with breakfast    PO Evaluation     % PO Intake No recent meals documented however patient reports eating less than 50%.     Factors Affecting Intake: abdominal pain, nausea, vomiting   --  PES STATEMENT / NUTRITION DIAGNOSIS      Nutrition Dx Problem  Problem: Severe acute disease related malnutrition   Etiology: Decreased PO intakes in the context of acute complexity   Signs/Symptoms: Significant weight loss of 2.8% x 1 week, < 50% of estimated energy requirements for > 5 days, and muscle wasting as noted on physical exam.      NUTRITION INTERVENTION / PLAN OF CARE      Intervention Goal(s) Increase intake, Accepts oral nutrition supplement, and Maintain weight         RD Intervention/Action Liberalize or adjust diet to: Regular , Supplement provided, and Other (comment): Increasing frequency   --       Prescription/Orders:       PO Diet Regular       Supplements Boost Glucose Control TID (Provides 570 kcals, 48 g protein if consumed) -chocolate       Enteral Nutrition       Parenteral Nutrition    New Prescription Ordered? Yes   --      Monitor/Evaluation Per protocol, PO intake, Supplement intake, Pertinent labs, Weight, Skin status, Symptoms   Discharge Plan/Needs Pending clinical course   --    RD to follow per protocol.      Electronically signed by:  Lynn Bedoya RD  11/30/23 10:34 EST

## 2023-11-30 NOTE — PROGRESS NOTES
LOS: 6 days   Patient Care Team:  Brett Jackson MD as PCP - General (Internal Medicine)  Gerard Foreman MD as Referring Physician (Medical Oncology)  Darrell Reinoso MD as Consulting Physician (Hematology and Oncology)  Albin Barriga MD as Cardiologist (Cardiology)  Richard Aldana, RN as     Chief Complaint:     F/u chf and cad    Interval History:     She is still very short winded and on high flow oxygen.  She denies chest pain, nausea, headache.  She is very fatigued.    Echo 11/27/23  Interpretation Summary         Left ventricular systolic function is normal. Calculated left ventricular EF = 55.3% Normal left ventricular cavity size noted. Left ventricular wall thickness is consistent with moderate concentric hypertrophy. All left ventricular wall segments contract normally. Left ventricular diastolic function is consistent with (grade II w/high LAP) pseudonormalization.    The left atrial cavity is moderately dilated. Left atrial volume is underestimated  The right atrial cavity is moderately dilated.    The aortic valve is abnormal in structure. There is severe thickening of the non-coronary cusp(s) of the aortic valve. There is a mobile undulating mass on what appears to be the ventricular surface of the aortic valve though a clear attachment is not visualized. It is highly suspicious for vegetation. Cannot determine if this is a bileaflet or trileaflet valve Trace aortic valve regurgitation is present. Mild aortic valve stenosis is present.    There is moderate, bileaflet mitral valve thickening present. Mild to moderate mitral valve regurgitation is present. Mild mitral valve stenosis is present. The mean mitral valve gradient is 5 mm Hg at a heart rate of 97 bpm    Trace tricuspid valve regurgitation is present. Estimated right ventricular systolic pressure from tricuspid regurgitation is moderately elevated (45-55 mmHg). Calculated right ventricular systolic pressure from tricuspid  regurgitation is 45 mmHg.      Cath 11/29/23  CONCLUSION:  1.  Stable coronary artery disease:  Mild to moderate nonobstructive disease of the LAD and left circumflex arteries  *Patent diagonal branch stent with no significant in-stent restenosis  *100% chronic total occlusion of the distal right coronary artery with left-to-right collateral filling     RECOMMENDATIONS:  Continue medical management of coronary artery disease.     FINDINGS:  SELECTIVE CORONARY ANGIOGRAMS:  1.  Left main artery: Large-caliber vessel with 0% stenosis.  The vessel bifurcates into left anterior descending and left circumflex arteries.  2.  Left anterior descending artery: Moderate caliber vessel with 0% proximal stenosis.  Proximal vessel gives rise to a moderate caliber first diagonal branch.  There is a stent extending from the proximal to the mid vessel that is widely patent with no significant in-stent restenosis.  The ostium of the diagonal branch has a stable appearing 50% stenosis.  The mid to distal LAD following the origin of the diagonal branch is a very small caliber vessel with stable appearing 40 to 50% mid stenosis.  3.  Left circumflex artery: Proximally a large caliber vessel with 0% stenosis.  Gives rise to a moderate caliber first obtuse marginal branch with no significant disease.  The mid circumflex tapers to a moderate caliber vessel with no significant disease and gives rise to a moderate caliber second obtuse marginal branch with 30 to 40% proximal stenosis.  The distal continuation circumflex vessel tapers to small caliber with no significant stenosis.  4.  Right coronary artery: Proximally large-caliber vessel.  There is diffuse severe calcification extending from the proximal through the distal vessel.  The distal vessel has a chronic 100% total occlusion.  There is left-to-right collateral filling of the posterior descending and posterolateral branch.    Objective   Vital Signs  Temp:  [97.6 °F (36.4 °C)-98.1  °F (36.7 °C)] 97.9 °F (36.6 °C)  Heart Rate:  [] 93  Resp:  [10-20] 18  BP: (104-130)/(48-83) 115/51    Intake/Output Summary (Last 24 hours) at 11/30/2023 0929  Last data filed at 11/30/2023 0754  Gross per 24 hour   Intake 379.67 ml   Output 2000 ml   Net -1620.33 ml       Comfortable NAD  Neck supple, no JVD or thyromegaly appreciated  S1/S2 RRR, no m/r/g  Lungs decreased throughout, normal effort  Abdomen S/NT/ND (+) BS, no HSM appreciated  Extremities warm, no clubbing, cyanosis, or edema  No visible or palpable skin lesions  A/Ox4, mood and affect appropriate    Results Review:      Results from last 7 days   Lab Units 11/30/23 0421 11/29/23 0427 11/28/23  0505   SODIUM mmol/L 139 134* 135*   POTASSIUM mmol/L 4.0 4.7 4.7   CHLORIDE mmol/L 96* 96* 97*   CO2 mmol/L 24.9 22.0 24.0   BUN mg/dL 21 20 18   CREATININE mg/dL 0.93 0.80 0.80   GLUCOSE mg/dL 202* 69 80   CALCIUM mg/dL 7.6* 8.0* 8.7     Results from last 7 days   Lab Units 11/30/23  0421 11/29/23  1108 11/29/23  0745   HSTROP T ng/L 27* 29* 32*     Results from last 7 days   Lab Units 11/30/23  0421 11/29/23  0427 11/28/23  0505   WBC 10*3/mm3 78.89* 97.19* 98.09*   HEMOGLOBIN g/dL 8.4* 7.1* 7.4*   HEMATOCRIT % 26.2* 22.9* 23.0*   PLATELETS 10*3/mm3 285 273 268             Results from last 7 days   Lab Units 11/30/23  0421   MAGNESIUM mg/dL 1.7           I reviewed the patient's new clinical results.  I personally viewed and interpreted the patient's EKG/Telemetry data        Medication Review:   aspirin, 81 mg, Oral, Daily  Eucerin original healing lotion, , Topical, Q12H  furosemide, 40 mg, Intravenous, Once  lactated ringers, 1,000 mL, Intravenous, Once  magnesium sulfate, 4 g, Intravenous, Once  Menthol-Zinc Oxide, 1 application , Topical, Q12H  metoprolol succinate XL, 25 mg, Oral, Daily  Momelotinib Dihydrochloride, 200 mg, Oral, Q24H  pantoprazole, 40 mg, Oral, Q AM  predniSONE, 15 mg, Oral, Daily With Breakfast   Followed by  [START ON  12/6/2023] predniSONE, 10 mg, Oral, Daily With Breakfast   Followed by  [START ON 12/13/2023] predniSONE, 5 mg, Oral, Daily With Breakfast  senna-docusate sodium, 2 tablet, Oral, BID  sertraline, 100 mg, Oral, Daily  sodium chloride, 10 mL, Intravenous, Q12H  tiotropium bromide monohydrate, 2 puff, Inhalation, Daily - RT  vancomycin, 1,000 mg, Intravenous, Q24H        Pharmacy to dose vancomycin,         Assessment & Plan       MRSA bacteremia    CAD (coronary artery disease)    Paroxysmal atrial fibrillation    Myeloproliferative disorder    Type 2 diabetes mellitus with circulatory disorder, without long-term current use of insulin    CAP (community acquired pneumonia)      Acute hypoxic respiratory failure - due to HFpEF  Community acquired pneumonia on antibiotics  MRSA bacteremia - will get 4 weeks of antibiotics.   Metabolic acidosis  Myeloproliferative disorder with anemia, worsening, did receive pRBC on 11/29/23.   History of marantic vegetations  - c/w echo 9/2022, there is no change.  Diabetes mellitus type 2  Paroxysmal atrial fibrillation, on Eliquis.  Heart rate is controlled on metoprolol.  CAD with diagonal stent for in-stent restenosis 9/2022, cath 11/29 shows patent stent.     Received IV lasix yesterday - helped but still high oxygen requirements and volume overload - lasix 40mg iv 1 8, check cxr. BP low.     Will follow.     Marimar Fink MD  11/30/23  09:29 EST

## 2023-11-30 NOTE — PROGRESS NOTES
Subjective     HISTORY OF PRESENT ILLNESS:   The patient has had no recurrence of nausea/vomiting since restarting momelotinib.  She has had worsening oxygenation which started before her PRBC transfusion.  Chest x-ray showed possible congestion and she was given IV Lasix.  She is now on high flow oxygen and complains short of breath.    Past Medical History, Past Surgical History, Social History, Family History have been reviewed and are without significant changes except as mentioned.    Review of Systems   A comprehensive 14 point review of systems was performed and was negative except as mentioned.    Medications:  The current medication list was reviewed in the EMR    ALLERGIES:    Allergies   Allergen Reactions    Aspirin Unknown - Low Severity    Diphenhydramine Hcl Anxiety     Benadryl IVP    Hydroxyurea Other (See Comments)     Her hemoglobin drop and was stop in February 2022 and start taking Jakafi    Oxycodone Unknown - Low Severity       Objective      Vitals:    11/30/23 0754 11/30/23 0938 11/30/23 0943 11/30/23 0948   BP: 115/51 95/51     BP Location: Left arm      Patient Position: Lying      Pulse: 93 95 88 107   Resp: 18  18 18   Temp:       TempSrc:       SpO2: 95%  93% 91%   Weight:       Height:             10/18/2023     4:02 PM   Current Status   ECOG score 2       Physical Exam    CONSTITUTIONAL: pleasant well-developed elderly woman  HEENT: no icterus, no thrush, moist membranes  LYMPH: no cervical or supraclavicular lad  CV: RRR, S1S2, no murmur  RESP: Breath sounds are diminished, high flow oxygen  MUSC: no edema   NEURO: alert and oriented x3, normal strength  PSYCH: normal mood and affect      RECENT LABS:  Hematology WBC   Date Value Ref Range Status   11/30/2023 78.89 (C) 3.40 - 10.80 10*3/mm3 Final     RBC   Date Value Ref Range Status   11/30/2023 2.84 (L) 3.77 - 5.28 10*6/mm3 Final     Hemoglobin   Date Value Ref Range Status   11/30/2023 8.4 (L) 12.0 - 15.9 g/dL Final      Hematocrit   Date Value Ref Range Status   11/30/2023 26.2 (L) 34.0 - 46.6 % Final     Platelets   Date Value Ref Range Status   11/30/2023 285 140 - 450 10*3/mm3 Final              Assessment & Plan   Patient is a pleasant 83-year-old female with medical history significant for JAK2 positive myeloproliferative disorder, perhaps primary myelofibrosis, A-fib on chronic Eliquis, CAD with stents, history of endocarditis and TIA admitted with nausea and vomiting.       #Nausea and vomiting due to suspected sepsis and pneumonia related:  Patient had presented to [facility] ER on 11/23/2023 with nausea and vomiting.  Patient was discharged just a day before after prolonged hospitalization for ESBL UTI, acute renal failure and respiratory distress.  Patient states that she initially reached the rehab facility, she started to feel bad.  She developed nausea and vomiting and this was brought back to the hospital.  Work-up in the ER with CT scans did not show any acute abnormalities.  Persistent bilateral pulmonary opacities, concerning for multifocal pneumonia and/or aspiration.  Elevated lactic acid level of 5.2  11/26: Continues to have N/V.  Started on IV Protonix daily.  Encouraged to ask for Zofran.  Lactate improved to 4.8.  Continue IV fluids and antibiotics as per primary. 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  This is likely primary myelofibrosis +/-steroids and infection related  11/29/2023 White count increasing-WBC 97.1, hemoglobin 7.1, platelets 273-restarted momelotinib with good tolerance  11/30/2023-WBC 78.8, hemoglobin 8.4, platelets 285     #Leukocytosis: As above    #MRSA bacteremia:  ID managing, on vancomycin    #Liver cirrhosis and  splenomegaly     #Acute on chronic hypoxic respiratory failure secondary to diastolic heart failure and pneumonia     #A-fib on Eliquis     Recommendations:  -Continue Ojjara given increasing leukocytosis/worsening anemia-monitor for recurrence of nausea/vomiting  -Daily CBC with differential

## 2023-12-01 NOTE — CASE MANAGEMENT/SOCIAL WORK
Continued Stay Note  [facility]     Patient Name: Catherine Boyer  MRN: [id number]  Today's Date: 12/1/2023    Admit Date: 11/23/2023    Plan: DC to [facility], will need ambulance transport   Discharge Plan       Row Name 12/01/23 1455       Plan    Plan DC to [facility], will need ambulance transport    Plan Comments Pt has been accepted to [facility] when medically stable. CCP will continue to follow, and make necessary arrangements when pt appropirate for discharge.                   Discharge Codes    No documentation.                 Expected Discharge Date and Time       Expected Discharge Date Expected Discharge Time    Dec 4, 2023               Cherry Watts RN

## 2023-12-01 NOTE — PLAN OF CARE
Goal Outcome Evaluation:              Outcome Evaluation: REMAINS ON OPTIFLOW. NO RESPIRATORY DISTRESS NOTED AT REST. HER ONLY C/O IS SHE'S HAVING TROUBLE STAYING ASLEEP. HAD ATARAX AND MELATONIN AS REQUESTED. WENT INTO AFIB OVERNIGHT WITH 'S-150, ASYMPTOMATIC EXCEPT FOR B/P LOW.  CARDIO CALLED AND ORDERS GIVEN.  HR REMAINS ELEVATED AND B/P LOW AFTER   MEDS GIVEN. MONITORED CLOSELY. CONTINUES TO DENY ANY SYMPTOMS EXCEPT FOR TROUBLE SLEEPING.

## 2023-12-01 NOTE — PROGRESS NOTES
"    Patient Name: Catherine Boyer  :1940  83 y.o.      Patient Care Team:  Brett Jackson MD as PCP - General (Internal Medicine)  Gerard Foreman MD as Referring Physician (Medical Oncology)  Darrell Reinoso MD as Consulting Physician (Hematology and Oncology)  Albin Barriga MD as Cardiologist (Cardiology)  Richard Aldana, RN as     Chief Complaint: follow up heart failure, atrial fibrillation    Interval History: good UOP with IV lasix but still requiring a lot of oxygen. She is not in any distress. Went in to AF overnight. -150s. Got a dose of IV dig at 5:30       Objective   Vital Signs  Temp:  [97.5 °F (36.4 °C)-98.6 °F (37 °C)] 98.2 °F (36.8 °C)  Heart Rate:  [] 145  Resp:  [18-20] 20  BP: ()/(43-67) 96/43    Intake/Output Summary (Last 24 hours) at 2023 1216  Last data filed at 2023 0920  Gross per 24 hour   Intake --   Output 2350 ml   Net -2350 ml     Flowsheet Rows      Flowsheet Row First Filed Value   Admission Height 157.5 cm (62\") Documented at 2023 2312   Admission Weight 63.5 kg (140 lb) Documented at 2023 2312            Physical Exam:   General Appearance:    Alert, cooperative, in no acute distress   Lungs:     Rakes bilaterally to auscultation.  Normal respiratory effort and rate.      Heart:    irregular rhythm and normal rate, normal S1 and S2, no murmurs, gallops or rubs.     Chest Wall:    No abnormalities observed   Abdomen:     Soft, nontender, positive bowel sounds.     Extremities:   no cyanosis, clubbing or edema.  No marked joint deformities.  Adequate musculoskeletal strength.       Results Review:    Results from last 7 days   Lab Units 23  0819   SODIUM mmol/L 137   POTASSIUM mmol/L 3.0*   CHLORIDE mmol/L 88*   CO2 mmol/L 29.6*   BUN mg/dL 14   CREATININE mg/dL 0.91   GLUCOSE mg/dL 115*   CALCIUM mg/dL 8.1*     Results from last 7 days   Lab Units 23  0421 23  1108 23  0745   HSTROP T ng/L 27* 29* 32* "     Results from last 7 days   Lab Units 12/01/23  0819   WBC 10*3/mm3 85.16*   HEMOGLOBIN g/dL 10.6*   HEMATOCRIT % 32.6*   PLATELETS 10*3/mm3 301         Results from last 7 days   Lab Units 12/01/23  0819   MAGNESIUM mg/dL 2.2                   Medication Review:   apixaban, 5 mg, Oral, Q12H  aspirin, 81 mg, Oral, Daily  Eucerin original healing lotion, , Topical, Q12H  furosemide, 40 mg, Intravenous, Once  furosemide, 40 mg, Intravenous, Q8H  lactated ringers, 1,000 mL, Intravenous, Once  Menthol-Zinc Oxide, 1 application , Topical, Q12H  metoprolol succinate XL, 25 mg, Oral, Daily  Momelotinib Dihydrochloride, 200 mg, Oral, Q24H  pantoprazole, 40 mg, Oral, Q AM  predniSONE, 15 mg, Oral, Daily With Breakfast   Followed by  [START ON 12/6/2023] predniSONE, 10 mg, Oral, Daily With Breakfast   Followed by  [START ON 12/13/2023] predniSONE, 5 mg, Oral, Daily With Breakfast  senna-docusate sodium, 2 tablet, Oral, BID  sertraline, 100 mg, Oral, Daily  sodium chloride, 10 mL, Intravenous, Q12H  tiotropium bromide monohydrate, 2 puff, Inhalation, Daily - RT  vancomycin, 1,000 mg, Intravenous, Q24H         amiodarone, 1 mg/min, Last Rate: 1 mg/min (12/01/23 1124)   Followed by  amiodarone, 0.5 mg/min  Pharmacy to dose vancomycin,         Assessment & Plan   Acute hypoxic respiratory failure due to HFpEF (decompensated)  Atrial fibrillation with RVR   Community acquired pneumonia , on abx  MRSA bacteremia , on IV vanc.   Myeloproliferative disorder with anemia, worsening. Status post transfusion 11/29/23 H/H stable.   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 Unchanged  Diabetes mellitus type II  CAD with diagonal stent for ISR 9/2022. Patent on cath 11/29/223.    Over loaded on physical exam and imaging. Continue IV lasix. Replace K. Mag is ok.   IV amiodarone for AF. She also got a dose of IV digoxin this morning. No room in BP to titrate beta blocker.     Juli Brandt [facility]  12/01/23  12:16 EST

## 2023-12-01 NOTE — PROGRESS NOTES
Subjective     HISTORY OF PRESENT ILLNESS:   The patient remains on/still requiring high flow oxygen.  She denies nausea/vomiting.  She is in rapid A-fib.  She has been started on amiodarone and getting diuresed.  She is on steroid therapy.    Past Medical History, Past Surgical History, Social History, Family History have been reviewed and are without significant changes except as mentioned.    Review of Systems   A comprehensive 14 point review of systems was performed and was negative except as mentioned.    Medications:  The current medication list was reviewed in the EMR    ALLERGIES:    Allergies   Allergen Reactions    Aspirin Unknown - Low Severity    Diphenhydramine Hcl Anxiety     Benadryl IVP    Hydroxyurea Other (See Comments)     Her hemoglobin drop and was stop in February 2022 and start taking Jakafi    Oxycodone Unknown - Low Severity       Objective      Vitals:    12/01/23 0804 12/01/23 0805 12/01/23 0840 12/01/23 0842   BP:    96/43  Comment: Map 61   BP Location:    Right arm   Patient Position:    Lying   Pulse: (!) 140 (!) 151 (!) 147 (!) 145   Resp:       Temp:       TempSrc:       SpO2: 94% 95%  96%   Weight:       Height:             10/18/2023     4:02 PM   Current Status   ECOG score 2       Physical Exam    CONSTITUTIONAL: pleasant well-developed elderly woman lying comfortably in bed no current distress  HEENT: no icterus, no thrush, moist membranes  LYMPH: no cervical or supraclavicular lad  CV: Tachycardic    RESP: Breath sounds are diminished, high flow oxygen  MUSC: no edema   NEURO: alert and oriented x3, normal strength  PSYCH: normal mood and affect      RECENT LABS:  Hematology WBC   Date Value Ref Range Status   12/01/2023 85.16 (C) 3.40 - 10.80 10*3/mm3 Final     RBC   Date Value Ref Range Status   12/01/2023 3.44 (L) 3.77 - 5.28 10*6/mm3 Final     Hemoglobin   Date Value Ref Range Status   12/01/2023 10.6 (L) 12.0 - 15.9 g/dL Final     Hematocrit   Date Value Ref Range  Status   12/01/2023 32.6 (L) 34.0 - 46.6 % Final     Platelets   Date Value Ref Range Status   12/01/2023 301 140 - 450 10*3/mm3 Final     Lab Results   Component Value Date    GLUCOSE 115 (H) 12/01/2023    BUN 14 12/01/2023    CREATININE 0.91 12/01/2023    EGFRRESULT 75.7 03/21/2023    EGFR 62.0 12/01/2023    BCR 15.4 12/01/2023    K 3.0 (L) 12/01/2023    CO2 29.6 (H) 12/01/2023    CALCIUM 8.1 (L) 12/01/2023    PROTENTOTREF 7.0 03/21/2023    ALBUMIN 3.0 (L) 11/30/2023    BILITOT 0.9 11/30/2023    AST 15 11/30/2023    ALT 12 11/30/2023              Assessment & Plan   Patient is a pleasant 83-year-old female with medical history significant for JAK2 positive myeloproliferative disorder, perhaps primary myelofibrosis, A-fib on chronic Eliquis, CAD with stents, history of endocarditis and TIA admitted with nausea and vomiting.         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  This is likely primary myelofibrosis +/-steroids and infection related  11/29/2023 White count increasing-WBC 97.1, hemoglobin 7.1, platelets 273-restarted momelotinib with good tolerance; transfused 1 unit PRBCs  11/30/2023-WBC 78.8, hemoglobin 8.4, platelets 285  12/1/2023-WBC 85, hemoglobin 10.6, platelets 301       #MRSA bacteremia:  ID managing, on vancomycin    #Liver cirrhosis and splenomegaly     #Acute on chronic hypoxic respiratory failure secondary to diastolic heart failure/pulmonary hypertension and pneumonia/interstitial infiltrates  CT angiogram chest 11/24/2023-no PE, diffuse bilateral airspace opacities  It looks like the patient has history of airspace opacities before initiation of momelotinib; I do not see any kind of pneumonitis listed as a side effect, but does have cardiac and infectious risks     #A-fib currently with rapid rate on Eliquis and amiodarone started     Recommendations:  -Continue Ojjara given increasing  leukocytosis/worsening anemia   -Daily CBC with differential, transfusion support as needed

## 2023-12-01 NOTE — PROGRESS NOTES
LOS: 7 days   Patient Care Team:  Brett Jackson MD as PCP - General (Internal Medicine)  Gerard Foreman MD as Referring Physician (Medical Oncology)  Darrell Reinoso MD as Consulting Physician (Hematology and Oncology)  Albin Barriga MD as Cardiologist (Cardiology)  Richard Aldana, RN as     Subjective     Patient is a 83 y.o. female.  Asked to see regarding respiratory failure patient is resting comfortably on heated high flow O2 she f was a little bit better again today than yesterday not having chest pain but she just is very weak and still short of breath with exertion.  She did go into A-fib with rapid rate rates been up in the 150s currently she is in the mid 120s to 130s    Review of Systems:          Objective     Vital Signs  Vital Sign Min/Max for last 24 hours  Temp  Min: 97.5 °F (36.4 °C)  Max: 98.2 °F (36.8 °C)   BP  Min: 80/47  Max: 114/66   Pulse  Min: 66  Max: 151   Resp  Min: 16  Max: 20   SpO2  Min: 93 %  Max: 99 %   Flow (L/min)  Min: 5  Max: 50   Weight  Min: 61.6 kg (135 lb 12.9 oz)  Max: 61.6 kg (135 lb 12.9 oz)        Ventilator/Non-Invasive Ventilation Settings (From admission, onward)      None                         Body mass index is 24.84 kg/m².  I/O last 3 completed shifts:  In: -   Out: 4600 [Urine:4600]  I/O this shift:  In: -   Out: 100 [Urine:100]        Physical Exam:  General Appearance: Patient is resting in bed she is on heated high flow O2 at 50 L and 70% with oxygen saturations between 95 and 98% she does not appear in distress  Eyes: Conjunctiva clear and anicteric  ENT: Mucous membranes are moist no exudate  Neck: No jugular venous tension, trachea midline  Lungs: Clear nonlabored symmetric expansion really do not hear any rales she is nonlabored  Cardiac: Irregularly irregular and tachycardic heart rate in the 120s A-fib on the monitor  Abdomen: Soft no hepatosplenomegaly or masses  : Not examined  Musculoskeletal: Mild thoracic kyphosis  Skin:  Warm and dry no jaundice no petechiae  Neuro:  alert and oriented cooperative following commands and grossly intact  Extremities/P Vascular: No clubbing no cyanosis no edema palpable radial and dorsalis pedis pulses  MSE: Pleasant and appropriate       Labs:  Results from last 7 days   Lab Units 12/01/23  0819 11/30/23  0421 11/29/23  0427 11/28/23  0505 11/27/23  0950 11/26/23  2325 11/26/23  0338 11/25/23  0246   GLUCOSE mg/dL 115* 202* 69 80 69  --  65 116*   SODIUM mmol/L 137 139 134* 135* 135*  --  139 140   POTASSIUM mmol/L 3.0* 4.0 4.7 4.7 4.0 4.2 2.7* 3.3*   MAGNESIUM mg/dL 2.2 1.7  --   --   --   --   --  1.9   CO2 mmol/L 29.6* 24.9 22.0 24.0 26.5  --  27.1 29.0   CHLORIDE mmol/L 88* 96* 96* 97* 95*  --  97* 95*   ANION GAP mmol/L 19.4* 18.1* 16.0* 14.0 13.5  --  14.9 16.0*   CREATININE mg/dL 0.91 0.93 0.80 0.80 0.83  --  0.76 0.86   BUN mg/dL 14 21 20 18 15  --  15 22   BUN / CREAT RATIO  15.4 22.6 25.0 22.5 18.1  --  19.7 25.6*   CALCIUM mg/dL 8.1* 7.6* 8.0* 8.7 8.4*  --  8.3* 8.0*   ALK PHOS U/L  --  155*  --   --   --   --   --  190*   TOTAL PROTEIN g/dL  --  4.6*  --   --   --   --   --  5.6*   ALT (SGPT) U/L  --  12  --   --   --   --   --  18   AST (SGOT) U/L  --  15  --   --   --   --   --  15   BILIRUBIN mg/dL  --  0.9  --   --   --   --   --  0.8   ALBUMIN g/dL  --  3.0*  --   --   --   --   --  3.5   GLOBULIN gm/dL  --   --   --   --   --   --   --  2.1     Estimated Creatinine Clearance: 39 mL/min (by C-G formula based on SCr of 0.91 mg/dL).      Results from last 7 days   Lab Units 12/01/23  0819 11/30/23  0421 11/29/23  0427 11/28/23  0505 11/27/23  0950 11/26/23  0338 11/25/23  0246   WBC 10*3/mm3 85.16* 78.89* 97.19* 98.09* 98.25* 87.18* 84.33*   RBC 10*6/mm3 3.44* 2.84* 2.39* 2.41* 2.66* 2.67* 2.84*   HEMOGLOBIN g/dL 10.6* 8.4* 7.1* 7.4* 7.5* 8.1* 8.5*   HEMATOCRIT % 32.6* 26.2* 22.9* 23.0* 24.4* 24.9* 26.3*   MCV fL 94.8 92.3 95.8 95.4 91.7 93.3 92.6   MCH pg 30.8 29.6 29.7 30.7 28.2 30.3  29.9   MCHC g/dL 32.5 32.1 31.0* 32.2 30.7* 32.5 32.3   RDW % 19.8* 19.0* 20.8* 20.0* 19.7* 19.7* 19.9*   RDW-SD fl 66.9* 61.4* 70.4* 67.7* 64.5* 67.2* 67.2*   MPV fL 11.7 11.5 12.5* 12.0 11.8 11.8 11.9   PLATELETS 10*3/mm3 301 285 273 268 300 291 293   NEUTROS ABS 10*3/mm3 62.17* 50.49* 71.92* 87.30*  --   --   --    EOS ABS 10*3/mm3 0.85*  --   --   --   --   --   --    NRBC /100 WBC  --  0.3* 1.0*  0.4*  --   --   --   --      Results from last 7 days   Lab Units 11/29/23  0845   PH, ARTERIAL pH units 7.463*   PO2 ART mm Hg 51.8*   PCO2, ARTERIAL mm Hg 35.7   HCO3 ART mmol/L 25.6     Results from last 7 days   Lab Units 11/30/23  0421 11/29/23  1108 11/29/23  0745   HSTROP T ng/L 27* 29* 32*     Results from last 7 days   Lab Units 11/29/23  0745   PROBNP pg/mL 8,066.0*     Results from last 7 days   Lab Units 11/30/23  0421   TSH uIU/mL 0.726     Results from last 7 days   Lab Units 11/25/23  0246 11/24/23  1922   LACTATE mmol/L 4.8* 6.0*   PROCALCITONIN ng/mL 0.14  --          Microbiology Results (last 10 days)       Procedure Component Value - Date/Time    Blood Culture - Blood, Arm, Left [[id number]]  (Normal) Collected: 11/26/23 1309    Lab Status: Final result Specimen: Blood from Arm, Left Updated: 12/01/23 1330     Blood Culture No growth at 5 days    Blood Culture - Blood, Arm, Left [[id number]]  (Normal) Collected: 11/26/23 1222    Lab Status: Final result Specimen: Blood from Arm, Left Updated: 12/01/23 1230     Blood Culture No growth at 5 days    Blood Culture - Blood, Arm, Left [[id number]]  (Abnormal)  (Susceptibility) Collected: 11/24/23 0241    Lab Status: Final result Specimen: Blood from Arm, Left Updated: 11/27/23 0620     Blood Culture Staphylococcus aureus, MRSA     Comment:   Infectious disease consultation is highly recommended to rule out distant foci of infection.  Methicillin resistant Staphylococcus aureus, Patient may be an isolation risk.        Isolated from Aerobic Bottle     Gram  Stain Aerobic Bottle Gram positive cocci in clusters    Narrative:      Less than seven (7) mL's of blood was collected.  Insufficient quantity may yield false negative results.    Susceptibility        Staphylococcus aureus, MRSA      BIJAN      Gentamicin Susceptible      Oxacillin Resistant      Rifampin Susceptible      Vancomycin Susceptible                       Susceptibility Comments       Staphylococcus aureus, MRSA    This isolate is presumed to be clindamycin resistant based on detection of inducible clindamycin resistance.  Clindamycin may still be effective in some patients.               Blood Culture ID, PCR - Blood, Arm, Left [[id number]]  (Abnormal) Collected: 11/24/23 0241    Lab Status: Final result Specimen: Blood from Arm, Left Updated: 11/25/23 0447     BCID, PCR Staph aureus. mecA/C and MREJ (methicillin resistance gene) detected. Identification by BCID2 PCR.     BOTTLE TYPE Aerobic Bottle    Narrative:      Infectious disease consultation is highly recommended to rule out distant foci of infection.    Blood Culture - Blood, Hand, Right [[id number]]  (Normal) Collected: 11/24/23 0201    Lab Status: Final result Specimen: Blood from Hand, Right Updated: 11/29/23 0226     Blood Culture No growth at 5 days    Narrative:      Less than seven (7) mL's of blood was collected.  Insufficient quantity may yield false negative results.    Respiratory Panel PCR w/COVID-19(SARS-CoV-2) DAMIAN/ARIANA/JAGDEEP/PAD/COR/ADRIANA In-House, NP Swab in UTM/VTM, 2 HR TAT - Swab, Nasopharynx [[id number]]  (Normal) Collected: 11/24/23 0201    Lab Status: Final result Specimen: Swab from Nasopharynx Updated: 11/24/23 0400     ADENOVIRUS, PCR Not Detected     Coronavirus 229E Not Detected     Coronavirus HKU1 Not Detected     Coronavirus NL63 Not Detected     Coronavirus OC43 Not Detected     COVID19 Not Detected     Human Metapneumovirus Not Detected     Human Rhinovirus/Enterovirus Not Detected     Influenza A PCR Not Detected      Influenza B PCR Not Detected     Parainfluenza Virus 1 Not Detected     Parainfluenza Virus 2 Not Detected     Parainfluenza Virus 3 Not Detected     Parainfluenza Virus 4 Not Detected     RSV, PCR Not Detected     Bordetella pertussis pcr Not Detected     Bordetella parapertussis PCR Not Detected     Chlamydophila pneumoniae PCR Not Detected     Mycoplasma pneumo by PCR Not Detected    Narrative:      In the setting of a positive respiratory panel with a viral infection PLUS a negative procalcitonin without other underlying concern for bacterial infection, consider observing off antibiotics or discontinuation of antibiotics and continue supportive care. If the respiratory panel is positive for atypical bacterial infection (Bordetella pertussis, Chlamydophila pneumoniae, or Mycoplasma pneumoniae), consider antibiotic de-escalation to target atypical bacterial infection.                apixaban, 5 mg, Oral, Q12H  aspirin, 81 mg, Oral, Daily  Eucerin original healing lotion, , Topical, Q12H  furosemide, 40 mg, Intravenous, Once  furosemide, 40 mg, Intravenous, Q8H  lactated ringers, 1,000 mL, Intravenous, Once  Menthol-Zinc Oxide, 1 application , Topical, Q12H  metoprolol succinate XL, 25 mg, Oral, Daily  Momelotinib Dihydrochloride, 200 mg, Oral, Q24H  pantoprazole, 40 mg, Oral, Q AM  predniSONE, 15 mg, Oral, Daily With Breakfast   Followed by  [START ON 12/6/2023] predniSONE, 10 mg, Oral, Daily With Breakfast   Followed by  [START ON 12/13/2023] predniSONE, 5 mg, Oral, Daily With Breakfast  senna-docusate sodium, 2 tablet, Oral, BID  sertraline, 100 mg, Oral, Daily  sodium chloride, 10 mL, Intravenous, Q12H  tiotropium bromide monohydrate, 2 puff, Inhalation, Daily - RT  vancomycin, 1,000 mg, Intravenous, Q24H      amiodarone, 1 mg/min, Last Rate: 1 mg/min (12/01/23 1124)   Followed by  amiodarone, 0.5 mg/min  Pharmacy to dose vancomycin,         Diagnostics:  XR Chest 1 View    Result Date: 11/30/2023  XR CHEST 1  VW-  HISTORY: Female who is 83 years-old, dyspnea  TECHNIQUE: Frontal view of the chest  COMPARISON: 11/29/2023  FINDINGS: The heart size is borderline. Extensive bilateral infiltrates appear similar to prior exam. There may be mild left pleural effusion. No pneumothorax. No acute osseous process.      Extensive bilateral infiltrates appear similar to prior exam.    This report was finalized on 11/30/2023 1:35 PM by Dr. Reilly Ritter M.D on Workstation: [id number]      Cardiac Catheterization/Vascular Study    Result Date: 11/29/2023  CONCLUSION: 1.  Stable coronary artery disease: Mild to moderate nonobstructive disease of the LAD and left circumflex arteries *Patent diagonal branch stent with no significant in-stent restenosis *100% chronic total occlusion of the distal right coronary artery with left-to-right collateral filling RECOMMENDATIONS: Continue medical management of coronary artery disease. FINDINGS: SELECTIVE CORONARY ANGIOGRAMS: 1.  Left main artery: Large-caliber vessel with 0% stenosis.  The vessel bifurcates into left anterior descending and left circumflex arteries. 2.  Left anterior descending artery: Moderate caliber vessel with 0% proximal stenosis.  Proximal vessel gives rise to a moderate caliber first diagonal branch.  There is a stent extending from the proximal to the mid vessel that is widely patent with no significant in-stent restenosis.  The ostium of the diagonal branch has a stable appearing 50% stenosis.  The mid to distal LAD following the origin of the diagonal branch is a very small caliber vessel with stable appearing 40 to 50% mid stenosis. 3.  Left circumflex artery: Proximally a large caliber vessel with 0% stenosis.  Gives rise to a moderate caliber first obtuse marginal branch with no significant disease.  The mid circumflex tapers to a moderate caliber vessel with no significant disease and gives rise to a moderate caliber second obtuse marginal branch with 30 to 40%  proximal stenosis.  The distal continuation circumflex vessel tapers to small caliber with no significant stenosis. 4.  Right coronary artery: Proximally large-caliber vessel.  There is diffuse severe calcification extending from the proximal through the distal vessel.  The distal vessel has a chronic 100% total occlusion.  There is left-to-right collateral filling of the posterior descending and posterolateral branch. PROCEDURE: After informed consent was obtained the patient is brought to the cardiac catheterization laboratory where her right wrist was prepped and draped in a sterile manner.  1 mL of 2% lidocaine was infused subcutaneously to the right wrist.  Access to the right radial artery is obtained using a micropuncture needle followed by placement of a 5/6 Australian slender glide sheath. Had some difficulty advancing a J-wire through the proximal radial artery.  Was able to advance a BMW wire easily and advance the diagnostic catheter over this wire.  At the innominate artery there was severe tortuosity and difficulty advancing into the ascending aorta with a J-wire.  It exchanged out for a Glidewire which I was able to advance into the ascending aorta.  The remainder of the procedure was performed without any significant issues. Selective coronary angiogram to perform using a 5 Australian FL 3.5 and a 5 Australian FR4 diagnostic catheters.  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 Following clinician procedure all wires and catheters were removed.  Radial sheath was removed and a TR band placed.  After hemostasis was achieved the patient was transferred to the recovery area in stable condition.     XR Chest 1 View    Result Date: 11/29/2023  PORTABLE CHEST X-RAY  HISTORY: Chest pain.  TECHNIQUE: Portable chest x-ray is provided and correlated with recent prior exams.  FINDINGS: The cardiac silhouette appears enlarged but unchanged. Fairly dense abnormal parenchymal  opacity in the lungs bilaterally has a symmetrical, perihilar distribution. This appears more dense and more extensive than on CT angiogram chest 11/24/2023 or chest x-ray 11/20/2023. No pneumothorax.      Interval worsening in pulmonary parenchymal opacity in the perihilar distribution which may represent progressive pulmonary edema or pneumonia.  This report was finalized on 11/29/2023 9:20 AM by Dr. Charles Tirado M.D on Workstation: [id number]      Adult Transthoracic Echo Complete W/ Cont if Necessary Per Protocol    Result Date: 11/27/2023    Left ventricular systolic function is normal. Calculated left ventricular EF = 55.3% Normal left ventricular cavity size noted. Left ventricular wall thickness is consistent with moderate concentric hypertrophy. All left ventricular wall segments contract normally. Left ventricular diastolic function is consistent with (grade II w/high LAP) pseudonormalization.   The left atrial cavity is moderately dilated. Left atrial volume is underestimated  The right atrial cavity is moderately dilated.   The aortic valve is abnormal in structure. There is severe thickening of the non-coronary cusp(s) of the aortic valve. There is a mobile undulating mass on what appears to be the ventricular surface of the aortic valve though a clear attachment is not visualized. It is highly suspicious for vegetation. Cannot determine if this is a bileaflet or trileaflet valve Trace aortic valve regurgitation is present. Mild aortic valve stenosis is present.   There is moderate, bileaflet mitral valve thickening present. Mild to moderate mitral valve regurgitation is present. Mild mitral valve stenosis is present. The mean mitral valve gradient is 5 mm Hg at a heart rate of 97 bpm   Trace tricuspid valve regurgitation is present. Estimated right ventricular systolic pressure from tricuspid regurgitation is moderately elevated (45-55 mmHg). Calculated right ventricular systolic pressure from  tricuspid regurgitation is 45 mmHg.   Discussed with patient's nurse on floor who will contact primary team for cardiology consult     CT Angiogram Abdomen Pelvis    Result Date: 11/24/2023  Patient: MARIBELL RUGGIERO  Time Out: 01:24 Exam(s): CTA ABDOMEN + PELVIS EXAM:   CT Angiography Abdomen and Pelvis With Intravenous Contrast CLINICAL HISTORY:      Concern for acute aortic syndrome. TECHNIQUE:   Axial computed tomographic angiography images of the abdomen and pelvis with intravenous contrast.  CTDI is 37 mGy and DLP is 934 mGy-cm.  This CT exam was performed according to the principle of ALARA (As Low As Reasonably Achievable) by using one or more of the following dose reduction techniques: automated exposure control, adjustment of the mA and or kV according to patient size, and or use of iterative reconstruction technique.   MIP reconstructed images were created and reviewed. COMPARISON:   No relevant prior studies available. FINDINGS:  VASCULATURE:   Aorta:  No acute findings.  No aortic aneurysm or dissection.   Celiac trunk and mesenteric arteries:  Mild atherosclerosis of the origin of the celiac trunk, SMA and BETZY without significant stenosis.   Renal arteries:  No acute findings.  No occlusion or significant stenosis.   Iliac arteries:  Moderate atherosclerosis of the bilateral common iliac arteries, external iliac arteries and internal iliac arteries.  No occlusion or significant stenosis.   Lung bases:  Unremarkable.  No mass.  No consolidation.  ABDOMEN:   Liver:  Nodular contour of the liver is concerning for cirrhosis.   Gallbladder and bile ducts:  Cholecystectomy.  No ductal dilation.   Pancreas:  Unremarkable.  No ductal dilation.  No mass.   Spleen:  Splenomegaly.  The spleen is heterogeneous which could represent a perfusion anomaly.   Adrenals:  Unremarkable.  No mass.   Kidneys and ureters:  Unremarkable.  No hydronephrosis.  No solid mass.   Stomach and bowel:  Diverticulosis.  No obstruction.   No mucosal thickening.  PELVIS:   Appendix:  No findings to suggest acute appendicitis.   Bladder:  Unremarkable.  No mass.   Reproductive:  Unremarkable as visualized.  ABDOMEN and PELVIS:   Intraperitoneal space:  Unremarkable.  No significant fluid collection.  No free air.   Bones joints:  There are degenerative changes of the spine.  No acute fracture.  No dislocation.   Soft tissues:  Unremarkable.   Lymph nodes:  Unremarkable.  No enlarged lymph nodes. IMPRESSION:     1.  No aortic aneurysm or dissection. 2.  Nodular contour of the liver is concerning for cirrhosis. 3.  The spleen is heterogeneous which could represent a perfusion anomaly. 4.  Splenomegaly is nonspecific but concerning for portal hypertension. 5.  Diverticulosis.     Electronically signed by Yuki Starr MD on 11-24-23 at 0124    CT Angiogram Chest    Result Date: 11/24/2023  Patient: MARIBELL RUGGIERO  Time Out: 01:22 Exam(s): CTA CHEST EXAM:   CT Angiography Chest With Intravenous Contrast CLINICAL HISTORY:      Concern for acute aortic syndrome. TECHNIQUE:   Axial computed tomographic angiography images of the chest with intravenous contrast.  CTDI is 37 mGy and DLP is 934 mGy-cm.  This CT exam was performed according to the principle of ALARA (As Low As Reasonably Achievable) by using one or more of the following dose reduction techniques: automated exposure control, adjustment of the mA and or kV according to patient size, and or use of iterative reconstruction technique.   MIP reconstructed images were created and reviewed. COMPARISON:   No relevant prior studies available. FINDINGS:   Pulmonary arteries:  Unremarkable.  No pulmonary embolus.   Aorta:  Mild atherosclerosis.  No aortic aneurysm or dissection.   Lungs:  Diffuse bilateral airspace opacities are concerning for multifocal pneumonia and or aspiration.  Cannot exclude superimposed pulmonary edema.   Pleural space:  Unremarkable.  No significant effusion.  No pneumothorax.    Heart:  Unremarkable.  No cardiomegaly.  No significant pericardial effusion.  No evidence of RV dysfunction.   Bones joints:  There are degenerative changes of the spine.  No acute fracture.   Soft tissues:  Unremarkable.   Lymph nodes:  Unremarkable.  No enlarged lymph nodes. IMPRESSION:     1.  No aortic aneurysm or dissection. 2.  No pulmonary embolus. 3.  Diffuse bilateral airspace opacities are concerning for multifocal pneumonia and or aspiration.  Cannot exclude superimposed pulmonary edema.     Electronically signed by Yuki Starr MD on 11-24-23 at 0122    XR Chest 1 View    Result Date: 11/24/2023  Patient: MARIBELL RUGGIERO  Time Out: 01:00 Exam(s): XR CXR 1 VIEW EXAM:   XR Chest, 1 View CLINICAL HISTORY:      Nausea and upper abd pain. TECHNIQUE:   Frontal view of the chest. COMPARISON:   Chest radiograph 11 20 2023 FINDINGS:   Lungs:  Significantly worsened bilateral airspace opacities.   Pleural space:  New small left pleural effusion.  No pneumothorax.   Heart:  Unremarkable.  No cardiomegaly.   Mediastinum:  Unremarkable.  Normal mediastinal contour.   Bones joints:  There are degenerative changes of the spine.  No acute fracture. IMPRESSION:     1.  Significantly worsened bilateral airspace opacities. 2.  New small left pleural effusion.     Electronically signed by Yuki Starr MD on 11-24-23 at 0100    XR Chest 1 View    Result Date: 11/20/2023  XR CHEST 1 VW-  HISTORY: Female who is 83 years-old, hypoxemia  TECHNIQUE: Frontal views of the chest  COMPARISON: 11/8/2023  FINDINGS: The heart size is borderline. Aorta is calcified. Mild prominence of vascular interstitial markings. Previous right pneumothorax is no longer seen, may be resolved or obscured. Infiltrative opacities in the right lung appear partially improved, continued follow-up recommended. Small likely atelectasis or scarring at the left midlung. No large pleural effusion, or left pneumothorax. No acute osseous process.       Partial improvement.    This report was finalized on 11/20/2023 10:27 AM by Dr. Reilly Ritter M.D on Workstation: [id number]      XR Chest 1 View    Result Date: 11/9/2023  Portable chest radiograph  HISTORY: Pneumothorax  TECHNIQUE: Single AP portable radiograph of the chest  COMPARISON: Chest radiograph 11/8/2023      FINDINGS AND IMPRESSION: Previously seen right pneumothorax has decreased in size resulting in approximately 1.6 cm in pleural-parenchymal separation (previously 2.8 cm). There is moderate pulmonary opacification throughout bilateral lungs, right greater than left, suggestive of multifocal pneumonia and/or pulmonary edema in the appropriate clinical context and correlation with patient history is recommended with follow-up chest CT if clinically indicated. Given the somewhat masslike opacification overlying the right lung, at least continued attention on follow-up chest radiograph in 4 to 6 weeks is recommended to ensure appropriate evolution/resolution and exclude any possibility of neoplasm.  Cardiac silhouette is within normal its for size.  This report was finalized on 11/9/2023 1:13 AM by Dr. Cuba Lieberman M.D on Workstation: [id number]      CT Guided Chest Tube    Result Date: 11/8/2023  LIMITED CT CHEST  HISTORY:  right pneumothorax  COMPARISON: Chest x-ray earlier same day. CT  11/04/2023.  FINDINGS: After informed written consent was obtained with a witness present, the patient was placed in supine position. A planning CT scan of the chest was performed for anticipated chest tube placement. Only a relatively small right pneumothorax was seen at this time, potentially decreased from preceding chest x-ray. Images were reviewed with Dr. Alford who decided that chest tube placement was not warranted at this time. Compared to the recent CT a peripherally extending consolidated area in the right upper lobe was present, consistent with bronchoscopy procedure related hemorrhage/hematoma.  Partially obstructing opacity in left lower lobe bronchus could represent blood clot with associated consolidations/atelectasis in the left lung base. Less pronounced right lower lobe opacities could also be related to bleeding. Redemonstrated scattered bilateral groundglass opacities not directly comparable secondary to technique, worsening not excluded. New trace bilateral pleural effusions. All elements of maximal sterile technique were followed. Radiation dose reduction techniques were utilized, including automated exposure control and exposure modulation based on body size.       Small right pneumothorax, no chest tube placed at this time. See above for all findings.   This report was finalized on 11/8/2023 5:07 PM by Dr. Bruno Haider M.D on Workstation: [id number]      XR Chest PA & Lateral    Result Date: 11/8/2023  XR CHEST PA AND LATERAL-  HISTORY: Female who is 83 years-old, check for pneumothorax status post bronchoscopy  TECHNIQUE: Frontal and lateral views of the chest  COMPARISON: 9/27/2023  FINDINGS: The heart size is borderline. Aorta is calcified. Pulmonary vasculature is congested. Extensive patchy opacities in both lungs may be edema and/or pneumonia. Dense consolidation in the right upper lobe, about 6.3 cm, may represent postbiopsy hemorrhage. Right apical pneumothorax measures about 3.1 cm, about 20% right pneumothorax. No pleural effusion, or left pneumothorax. No acute osseous process.      Critical test result. Right pneumothorax, as described. Dense consolidation in the right upper lobe may represent postbiopsy hemorrhage. Extensive patchy opacities may be edema and/or pneumonia.  Discussed by telephone with patient's nurse, Ismael, at time of interpretation, 1408, 11/8/2023  This report was finalized on 11/8/2023 2:15 PM by Dr. Reilly Ritter M.D on Workstation: [id number]      XR Chest 1 View    Result Date: 11/8/2023  XR CHEST 1 VW-, FL C ARM DURING SURGERY-  HISTORY: Female who is 83  years-old, bronchoscopy, biopsies   TECHNIQUE: FLUOROSCOPIC ASSISTANCE IN THE OPERATING ROOM.  FINDINGS:  1 intraoperative fluoroscopic spot view was obtained and recorded the PACS for review, revealing right-sided bronchoscopy. Please see operative report for full details.  Fluoroscopy time: 36.1 seconds  Radiation exposure: Dose area product: 1.6852 Gy x cm(2)        As described.  This report was finalized on 11/8/2023 11:47 AM by Dr. Reilly Ritter M.D on Workstation: [id number]      FL C Arm During Surgery    Result Date: 11/8/2023  XR CHEST 1 VW-, FL C ARM DURING SURGERY-  HISTORY: Female who is 83 years-old, bronchoscopy, biopsies   TECHNIQUE: FLUOROSCOPIC ASSISTANCE IN THE OPERATING ROOM.  FINDINGS:  1 intraoperative fluoroscopic spot view was obtained and recorded the PACS for review, revealing right-sided bronchoscopy. Please see operative report for full details.  Fluoroscopy time: 36.1 seconds  Radiation exposure: Dose area product: 1.6852 Gy x cm(2)        As described.  This report was finalized on 11/8/2023 11:47 AM by Dr. Reilly Ritter M.D on Workstation: [id number]      CT Chest Without Contrast Diagnostic    Result Date: 11/4/2023  CT CHEST WO CONTRAST DIAGNOSTIC-  INDICATIONS: Lung mass  TECHNIQUE: Radiation dose reduction techniques were utilized, including automated exposure control and exposure modulation based on body size. Unenhanced CHEST CT  COMPARISON: 5/7/2023  FINDINGS:    The heart size is borderline without pericardial effusion. Prominent coronary arterial calcifications are apparent. Ascending aorta is dilated, 3.8 cm, not significantly changed. A few small subcentimeter short axis mediastinal lymph nodes are seen that are not significant by size criteria. Assessment of vascular, mediastinal, and hilar structures is limited without intravenous contrast material. Debris is seen in the esophagus.  The airways appear clear.  No pleural effusion or pneumothorax.  The lungs show again  show a pleural-based density in the lingula, axial image 58, also present the abdomen/pelvis CT from 11/2/2023 (and appears similar apart from increased extension medially), but representing a change from the chest CT from 5/7/2023, may be rounded atelectasis, pneumonia, or neoplasm. Since the recent abdomen/pelvis CT, consolidation has developed anteriorly laterally in the left lower lobe suspicious for pneumonia. Fairly extensive groundglass and consolidative opacities are present throughout both lungs, especially mid to upper lungs that may represent edema and/or atypical pneumonia.   Upper abdominal structures show no acute findings. Gallbladder is surgically absent. 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 Mild nonspecific thickening of the adrenal glands.  Degenerative changes are seen in the spine, shoulders. No acute fracture is identified.       Redemonstration of pleural-based density in the lingula, showing increased extension medially, representing a change from the chest CT from 5/7/2023, may be rounded atelectasis, pneumonia, or neoplasm; correlate clinically, PET/CT correlation can be obtained as indicated, in addition to continued CT follow-up. Since the recent abdomen/pelvis CT, consolidation has developed anteriorly laterally in the left lower lobe suspicious for pneumonia. Fairly extensive groundglass and consolidative opacities are present throughout both lungs, especially mid to upper lungs that may represent edema and/or atypical pneumonia    This report was finalized on 11/4/2023 9:17 AM by Dr. Reilly Ritter M.D on Workstation: [id number]      CT Abdomen Pelvis Without Contrast    Result Date: 11/2/2023  CT ABDOMEN PELVIS WO CONTRAST-  INDICATIONS: Abdominal pain  TECHNIQUE: Radiation dose reduction techniques were utilized, including automated exposure control and exposure modulation based on body size. Unenhanced ABDOMEN AND PELVIS CT  COMPARISON: 8/13/2023  FINDINGS:  The gallbladder is  surgically absent.  Mild nonspecific nodular thickening of the adrenal glands is seen.  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 A small arterial calcification is apparent at the left renal hilum. No hydronephrosis.  Otherwise unremarkable unenhanced appearance of the liver, spleen, adrenal glands, pancreas, kidneys, bladder.  No bowel obstruction or abnormal bowel thickening is identified. Colonic diverticula are seen that do not appear inflamed. Minimal hiatal hernia is seen. Pelvic floor relaxation/rectocele is apparent, correlate with clinical exam.  No free intraperitoneal gas or free fluid. Umbilical hernia of fat is noted.  Scattered small mesenteric and para-aortic lymph nodes are seen that are not significant by size criteria.  Abdominal aorta is not aneurysmal. Aortic and other arterial calcifications are present.  The lung bases show a new pleural-based density in the lingula, 1.3 x 3.3 cm on axial image 8, that could be rounded atelectasis or potentially a focus of infection or even neoplasm, correlate clinically, PET/CT correlation can be obtained as indicated, in addition to continued CT follow-up. Reticulonodular opacities in the lower lobes may be inflammatory/infectious in etiology, follow-up recommended.  Degenerative changes are seen in the spine. Stable sclerotic foci in the left aspect of the sacrum, right femur, nonspecific, may represent bone island. No acute fracture is identified.          1. A new indeterminate density in the lingula, as described. Reticulonodular pulmonary opacities in the bilateral lower lobes. Follow-up evaluation advised.  2. Colonic diverticulosis. No acute inflammatory process of bowel is identified. Follow-up as indications persist.  3. No urolithiasis or hydronephrosis. Small arterial calcification of the left renal hilum.  This report was finalized on 11/2/2023 7:22 PM by Dr. Reilly Ritter M.D on Workstation: [id number]      Results for orders placed during the  hospital encounter of 11/23/23    Adult Transthoracic Echo Complete W/ Cont if Necessary Per Protocol    Interpretation Summary    Left ventricular systolic function is normal. Calculated left ventricular EF = 55.3% Normal left ventricular cavity size noted. Left ventricular wall thickness is consistent with moderate concentric hypertrophy. All left ventricular wall segments contract normally. Left ventricular diastolic function is consistent with (grade II w/high LAP) pseudonormalization.    The left atrial cavity is moderately dilated. Left atrial volume is underestimated  The right atrial cavity is moderately dilated.    The aortic valve is abnormal in structure. There is severe thickening of the non-coronary cusp(s) of the aortic valve. There is a mobile undulating mass on what appears to be the ventricular surface of the aortic valve though a clear attachment is not visualized. It is highly suspicious for vegetation. Cannot determine if this is a bileaflet or trileaflet valve Trace aortic valve regurgitation is present. Mild aortic valve stenosis is present.    There is moderate, bileaflet mitral valve thickening present. Mild to moderate mitral valve regurgitation is present. Mild mitral valve stenosis is present. The mean mitral valve gradient is 5 mm Hg at a heart rate of 97 bpm    Trace tricuspid valve regurgitation is present. Estimated right ventricular systolic pressure from tricuspid regurgitation is moderately elevated (45-55 mmHg). Calculated right ventricular systolic pressure from tricuspid regurgitation is 45 mmHg.    Discussed with patient's nurse on floor who will contact primary team for cardiology consult      BiLateral perihilar infiltrate/edema unchanged on today's chest x-ray    Active Hospital Problems    Diagnosis  POA    **MRSA bacteremia [R78.81, B95.62]  Yes    CAP (community acquired pneumonia) [J18.9]  Yes    Myeloproliferative disorder [D47.1]  Yes    Type 2 diabetes mellitus with  circulatory disorder, 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   EKG A-fib with rapid rate      Assessment & Plan     Acute hypoxic respiratory failure slowly improving she feels her breathing is better certainly does not look in distress like she did yesterday  Acute heart failure preserved ejection fraction continue diuresis per cardiology  Bilateral pulmonary infiltrates acutely I think she had some CHF but these infiltrates are chronic her bronchoscopy last month showed some proteinaceous material no malignancy they raise the possibility of alveolar proteinosis that is not entirely excluded, generally a BAL can be diagnostic and therapeutic would be a little unusual to see in her age group, it is also more common in the mid and lower lung zones and her seems to be mid and upper lung zone infiltrates.  They did describe the BAL fluid as cloudy and she does have hematologic disorder.  Addendum I did check with Dr. Alford and the fluid was not milky with just a little cloudy he would not say the fluid was at all classic for PAP this also makes PAP less likely.  Right now she is certainly not a good candidate for lung biopsy or even repeat bronchoscopy.  We may have to talk and consider empiric steroids for a progressive interstitial process  MRSA bacteremia antibiotics per infectious disease  Aortic valve thickening/undulating mobile mass concerning for vegetation 9/22 felt to be a marantic vegetation per cardiology no change in echo appearance  New onset A-fib with rapid ventricular rate per cardiology  Myeloproliferative disorder JAK2 positive hematology following  Pulmonary hypertension by echocardiogram severe  Coronary artery disease recent stent cath today no acute stenoses  Diabetes mellitus type 2    Plan for disposition:    Gualberto PATEL  Jr Luther MD  12/01/23  13:53 EST    Time:

## 2023-12-01 NOTE — PROGRESS NOTES
Name: Catherine Boyer ADMIT: 2023   : 1940  PCP: Brett Jackson MD    MRN: [id number] LOS: 7 days   AGE/SEX: 83 y.o. female  ROOM: [facility]/1     Subjective     In bed, 3 daughters at bedside/supportive. Went into rapid a fib and is now on amiodarone gtt, also received digoxin. States she is tired and wants to sleep.    Objective   Objective   Vital Signs  Temp:  [97.5 °F (36.4 °C)-98.2 °F (36.8 °C)] 97.9 °F (36.6 °C)  Heart Rate:  [] 113  Resp:  [16-20] 16  BP: ()/(43-67) 109/67  SpO2:  [93 %-99 %] 95 %  on  Flow (L/min):  [5-50] 50;   Device (Oxygen Therapy): heated;high-flow nasal cannula  Body mass index is 24.84 kg/m².  Physical Exam  Constitutional:       General: She is in acute distress.      Appearance: She is ill-appearing and toxic-appearing.   HENT:      Head: Normocephalic and atraumatic.   Cardiovascular:      Rate and Rhythm: Normal rate and regular rhythm.   Pulmonary:      Effort: Respiratory distress (slight, decreased bs at bases) present.   Abdominal:      General: There is no distension.      Palpations: Abdomen is soft.      Tenderness: There is no abdominal tenderness.   Skin:     Coloration: Skin is pale.   Neurological:      Mental Status: She is alert.         Results Review     I reviewed the patient's new clinical results.  Results from last 7 days   Lab Units 23  0505   WBC 10*3/mm3 85.16* 78.89* 97.19* 98.09*   HEMOGLOBIN g/dL 10.6* 8.4* 7.1* 7.4*   PLATELETS 10*3/mm3 301 285 273 268     Results from last 7 days   Lab Units 23  0505   SODIUM mmol/L 137 139 134* 135*   POTASSIUM mmol/L 3.0* 4.0 4.7 4.7   CHLORIDE mmol/L 88* 96* 96* 97*   CO2 mmol/L 29.6* 24.9 22.0 24.0   BUN mg/dL 14 21 20 18   CREATININE mg/dL 0.91 0.93 0.80 0.80   GLUCOSE mg/dL 115* 202* 69 80   Estimated Creatinine Clearance: 39 mL/min (by C-G formula based on SCr of 0.91 mg/dL).  Results  "from last 7 days   Lab Units 11/30/23  0421 11/25/23  0246   ALBUMIN g/dL 3.0* 3.5   BILIRUBIN mg/dL 0.9 0.8   ALK PHOS U/L 155* 190*   AST (SGOT) U/L 15 15   ALT (SGPT) U/L 12 18     Results from last 7 days   Lab Units 12/01/23  0819 11/30/23  0421 11/29/23  0427 11/28/23  0505 11/26/23  0338 11/25/23  0246   CALCIUM mg/dL 8.1* 7.6* 8.0* 8.7   < > 8.0*   ALBUMIN g/dL  --  3.0*  --   --   --  3.5   MAGNESIUM mg/dL 2.2 1.7  --   --   --  1.9   PHOSPHORUS mg/dL  --   --   --   --   --  4.3    < > = values in this interval not displayed.     Results from last 7 days   Lab Units 11/25/23  0246 11/24/23  1922   PROCALCITONIN ng/mL 0.14  --    LACTATE mmol/L 4.8* 6.0*     COVID19   Date Value Ref Range Status   11/24/2023 Not Detected Not Detected - Ref. Range Final   11/08/2023 Not Detected Not Detected - Ref. Range Final     No results found for: \"HGBA1C\", \"POCGLU\"  Results for orders placed or performed during the hospital encounter of 11/23/23   Blood Culture - Blood, Arm, Left    Specimen: Arm, Left; Blood   Result Value Ref Range    Blood Culture No growth at 5 days          XR Chest 1 View  Narrative: XR CHEST 1 VW-     HISTORY: Female who is 83 years-old, dyspnea     TECHNIQUE: Frontal view of the chest     COMPARISON: 11/29/2023     FINDINGS: The heart size is borderline. Extensive bilateral infiltrates  appear similar to prior exam. There may be mild left pleural effusion.  No pneumothorax. No acute osseous process.     Impression: Extensive bilateral infiltrates appear similar to prior  exam.           This report was finalized on 11/30/2023 1:35 PM by Dr. Reilly Ritter M.D on Workstation: [email or web address]     Cardiac Catheterization/Vascular Study  CONCLUSION:  1.  Stable coronary artery disease:  Mild to moderate nonobstructive disease of the LAD and left circumflex   arteries  *Patent diagonal branch stent with no significant in-stent restenosis  *100% chronic total occlusion of the distal right coronary " artery with   left-to-right collateral filling    RECOMMENDATIONS:  Continue medical management of coronary artery disease.    FINDINGS:  SELECTIVE CORONARY ANGIOGRAMS:  1.  Left main artery: Large-caliber vessel with 0% stenosis.  The vessel   bifurcates into left anterior descending and left circumflex arteries.  2.  Left anterior descending artery: Moderate caliber vessel with 0%   proximal stenosis.  Proximal vessel gives rise to a moderate caliber first   diagonal branch.  There is a stent extending from the proximal to the mid   vessel that is widely patent with no significant in-stent restenosis.  The   ostium of the diagonal branch has a stable appearing 50% stenosis.  The   mid to distal LAD following the origin of the diagonal branch is a very   small caliber vessel with stable appearing 40 to 50% mid stenosis.  3.  Left circumflex artery: Proximally a large caliber vessel with 0%   stenosis.  Gives rise to a moderate caliber first obtuse marginal branch   with no significant disease.  The mid circumflex tapers to a moderate   caliber vessel with no significant disease and gives rise to a moderate   caliber second obtuse marginal branch with 30 to 40% proximal stenosis.    The distal continuation circumflex vessel tapers to small caliber with no   significant stenosis.  4.  Right coronary artery: Proximally large-caliber vessel.  There is   diffuse severe calcification extending from the proximal through the   distal vessel.  The distal vessel has a chronic 100% total occlusion.    There is left-to-right collateral filling of the posterior descending and   posterolateral branch.    PROCEDURE:  After informed consent was obtained the patient is brought to the cardiac   catheterization laboratory where her right wrist was prepped and draped in   a sterile manner.  1 mL of 2% lidocaine was infused subcutaneously to the   right wrist.  Access to the right radial artery is obtained using a   micropuncture  needle followed by placement of a 5/6 Maltese slender glide   sheath.    Had some difficulty advancing a J-wire through the proximal radial artery.    Was able to advance a BMW wire easily and advance the diagnostic catheter   over this wire.  At the innominate artery there was severe tortuosity and   difficulty advancing into the ascending aorta with a J-wire.  It exchanged   out for a Glidewire which I was able to advance into the ascending aorta.    The remainder of the procedure was performed without any significant   issues.    Selective coronary angiogram to perform using a 5 Maltese FL 3.5 and a 5   Maltese FR4 diagnostic catheters.  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    Following clinician procedure all wires and catheters were removed.    Radial sheath was removed and a TR band placed.  After hemostasis was   achieved the patient was transferred to the recovery area in stable   condition.    Scheduled Medications  apixaban, 5 mg, Oral, Q12H  aspirin, 81 mg, Oral, Daily  Eucerin original healing lotion, , Topical, Q12H  furosemide, 40 mg, Intravenous, Once  furosemide, 40 mg, Intravenous, Q8H  lactated ringers, 1,000 mL, Intravenous, Once  Menthol-Zinc Oxide, 1 application , Topical, Q12H  metoprolol succinate XL, 25 mg, Oral, Daily  Momelotinib Dihydrochloride, 200 mg, Oral, Q24H  pantoprazole, 40 mg, Oral, Q AM  predniSONE, 15 mg, Oral, Daily With Breakfast   Followed by  [START ON 12/6/2023] predniSONE, 10 mg, Oral, Daily With Breakfast   Followed by  [START ON 12/13/2023] predniSONE, 5 mg, Oral, Daily With Breakfast  senna-docusate sodium, 2 tablet, Oral, BID  sertraline, 100 mg, Oral, Daily  sodium chloride, 10 mL, Intravenous, Q12H  tiotropium bromide monohydrate, 2 puff, Inhalation, Daily - RT  vancomycin, 1,000 mg, Intravenous, Q24H    Infusions  amiodarone, 1 mg/min, Last Rate: 1 mg/min (12/01/23 1124)   Followed by  amiodarone, 0.5  mg/min  Pharmacy to dose vancomycin,     Diet  Diet: Regular/House Diet; Texture: Regular Texture (IDDSI 7); Fluid Consistency: Thin (IDDSI 0)       Assessment/Plan     Active Hospital Problems    Diagnosis  POA    **MRSA bacteremia [R78.81, B95.62]  Yes    CAP (community acquired pneumonia) [J18.9]  Yes    Myeloproliferative disorder [D47.1]  Yes    Type 2 diabetes mellitus with circulatory disorder, 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       83 y.o. female admitted with MRSA bacteremia.    Acute hypoxic respiratory failure  Acute on chronic diastolic CHF exacerbation  Pulmonary HTN by echo  PAF/A fib with RVR  CAD w/ H/o PCI- cath 11/29 showed stent patency  - Cardiology consulted and pt underwent cath 11/29 with patent stent  - on IV lasix  - getting iv amiodarone, continue eliquis, metoprolol    Sepsis secondary to MRSA bacteremia   Lactic Acidosis   -Her white blood cell count is elevated, though this is partially due to the myelodysplastic syndrome.    -on IV Vanc, ID following/signed off   -Cardiology has seen. ECHO without change from previous echo so no indication for any CHERI or surgery with ok valves.   -Plan for 4 weeks of IV Vanc     Myelodysplastic syndrome  -Patient takes ojjaara. Oncology following and plan to restart. Monitor WBC and anemia.   Receiving PRN PRBC transfusion     Lung infiltrates  Status post bronchoscopy on 11/8/2023 with biopsies.  Pathology showed no evidence of malignancy.  She was previously on IV steroids for prolonged course during her previous hospitalization and has then been transitioned to oral steroids and was discharged.  Starting to taper.  Plan was to have follow up CT chest 6-8 weeks from 11/10/23 with [facility]PC  -Giving IV hydrocortisone x 1 as stress dose steroids with drop in BP 11/29  - pulm  consulted 11/29    DM  A1c 6.6. Not currently on medications.     Eliquis for DVT prophylaxis.  DNR/DNI  Discussed with patient and nursing staff. And family.   Anticipate discharge to SNU facility timing yet to be determined.       Chandni Luo MD  [facility]  12/01/23  14:34 EST

## 2023-12-02 NOTE — PROGRESS NOTES
[facility]  [phone]  Dr. Sundeep Galarza     Subjective:  LOS: 8    Chief Complaint:  nausea/vomiting    Patient was initially sleeping on my evaluation.  She had many family members by her bedside.  All stated that she appeared to be doing better today.  Patient did awaken easily and appeared to be very alert and was not in any distress.  Patient also stated that she felt like she was doing much better today.  I discussed current plan of care including diuretics to get more fluid off.  Patient and family at bedside all expressed understanding and all questions were answered.    Objective   Vital Signs past 24hrs  Temp range: Temp (24hrs), Av.1 °F (36.7 °C), Min:97.9 °F (36.6 °C), Max:98.6 °F (37 °C)    BP range: BP: ()/(43-67) 116/45  Pulse range: Heart Rate:  [] 81  Resp rate range: Resp:  [16-18] 16  Device (Oxygen Therapy): nasal cannulaFlow (L/min):  [50] 50  Oxygen range:SpO2:  [94 %-98 %] 97 %     Physical Exam  Vitals reviewed.   Constitutional:       General: She is not in acute distress.     Appearance: Normal appearance.   HENT:      Head: Normocephalic and atraumatic.   Eyes:      Extraocular Movements: Extraocular movements intact.      Conjunctiva/sclera: Conjunctivae normal.      Pupils: Pupils are equal, round, and reactive to light.   Cardiovascular:      Rate and Rhythm: Normal rate and regular rhythm.      Heart sounds: No murmur heard.     No friction rub. No gallop.   Pulmonary:      Effort: Pulmonary effort is normal. No respiratory distress.      Breath sounds: Decreased breath sounds (bilaterally) and rales (bilaterally) present. No wheezing or rhonchi.   Abdominal:      General: Abdomen is flat.      Palpations: Abdomen is soft.      Tenderness: There is no abdominal tenderness.   Musculoskeletal:         General: No swelling or tenderness. Normal range of motion.      Cervical back: Normal range of motion. No rigidity or tenderness.   Skin:     General:  Skin is warm and dry.      Findings: No rash.   Neurological:      General: No focal deficit present.      Mental Status: She is alert and oriented to person, place, and time.   Psychiatric:         Mood and Affect: Mood normal.         Behavior: Behavior normal.       Results Review:    I have reviewed the laboratory and imaging data since the last note by [facility] physician.  My annotations are noted in assessment and plan.      Result Review:  I have personally reviewed the results from last note by [facility] physician to 12/2/2023 08:33 EST and agree with these findings:  [x]  Laboratory list / accordion  [x]  Microbiology  [x]  Radiology  [x]  EKG/Telemetry   [x]  Cardiology/Vascular   [x]  Pathology  [x]  Old records  []  Other:    Medication Review:  I have reviewed the current MAR.  My annotations are noted in assessment and plan.    apixaban, 5 mg, Oral, Q12H  aspirin, 81 mg, Oral, Daily  Eucerin original healing lotion, , Topical, Q12H  furosemide, 40 mg, Intravenous, Once  furosemide, 40 mg, Intravenous, Q8H  lactated ringers, 1,000 mL, Intravenous, Once  Menthol-Zinc Oxide, 1 application , Topical, Q12H  metoprolol succinate XL, 25 mg, Oral, Daily  Momelotinib Dihydrochloride, 200 mg, Oral, Q24H  pantoprazole, 40 mg, Oral, Q AM  predniSONE, 15 mg, Oral, Daily With Breakfast   Followed by  [START ON 12/6/2023] predniSONE, 10 mg, Oral, Daily With Breakfast   Followed by  [START ON 12/13/2023] predniSONE, 5 mg, Oral, Daily With Breakfast  senna-docusate sodium, 2 tablet, Oral, BID  sertraline, 100 mg, Oral, Daily  sodium chloride, 10 mL, Intravenous, Q12H  tiotropium bromide monohydrate, 2 puff, Inhalation, Daily - RT  vancomycin, 1,000 mg, Intravenous, Q24H        amiodarone, 0.5 mg/min, Last Rate: 0.5 mg/min (12/02/23 0506)  Pharmacy to dose vancomycin,       Lines, Drains & Airways       Active LDAs       Name Placement date Placement time Site Days    PICC Single Lumen 11/29/23 Left Brachial 11/29/23  8787   Brachial  2    External Urinary Catheter --  --  --  --                  Contact  Diet Orders (active) (From admission, onward)       Start     Ordered    11/30/23 1800  Dietary Nutrition Supplements Boost Glucose Control (Glucerna Shake)  Daily With Breakfast, Lunch & Dinner       11/30/23 1456    11/30/23 1457  Diet: Regular/House Diet; Texture: Regular Texture (IDDSI 7); Fluid Consistency: Thin (IDDSI 0)  Diet Effective Now         11/30/23 1457                    83 y.o. female with a history of CAD, Myeloproliferative disorder, PAF, Type 2 DM, chronic diastolic CHF, chronic hypoxic respiratory failure on 3L NC who presented to hospital with nausea, vomiting starting the day of arrival. She was just recently discharged on the day prior to presentation after a prolonged hospital course of 20 days with complicated course including ESBL UTI, acute renal failure, lung mass which required a bronchoscopy which was unfortunately complicated by an iatrogenic pneumothorax.  The bronchoscopy did not show any evidence of malignancy, nor was there any evidence of interstitial lung disease. On this admission she was found to have MRSA bacteremia and hypoxia requiring HHFNC with bilateral diffuse airspace opacities. Pulmonary is consulted regarding respiratory failure     Assessment  Acute on chronic hypoxic respiratory failure (baseline 3L NC)  Acute heart failure preserved ejection fraction  Bilateral pulmonary infiltrates acutely I think she had some CHF but these infiltrates are chronic and etiology is uncertain.    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 Cannot entirely rule out another interstitial process/ILD at this time but seems much less likely   - Right now she is certainly not a good candidate for lung biopsy or even repeat bronchoscopy.  We may have to talk and consider empiric steroids for a progressive interstitial process if she does not improve   - antibiotics per infectious disease  - continue aggressive diuresis as patient tolerates   - currently oxygen requirements improving, continue to wean oxygen for goal O2 saturation >90%  - pulmonary hygiene and OOB as tolerated, PT/OT         THESE ARE NEW MEDICAL PROBLEMS TO ME.      Sundeep Galarza DO   12/02/23  08:33 EST      Part of this note may be an electronic transcription/translation of spoken language to printed text using the Dragon Dictation System.

## 2023-12-02 NOTE — PROGRESS NOTES
"[facility] Clinical Pharmacy Services: Vancomycin Monitoring Note    Catherine Boyer is a 83 y.o. female who is on vancomycin for Bacteremia until 12/23.    Previous Vancomycin Dose:  1000mg IV every 24 hours  Updated Cultures and Sensitivities:   11/24: Blood Cx MRSA    Results from last 7 days   Lab Units 12/02/23  1025 11/28/23  0925   VANCOMYCIN TR mcg/mL 21.30* 15.20     Vitals/Labs  Ht: 157.5 cm (62\"); Wt: 61.4 kg (135 lb 5.8 oz)   Temp Readings from Last 1 Encounters:   12/02/23 97.9 °F (36.6 °C) (Oral)     Estimated Creatinine Clearance: 39 mL/min (by C-G formula based on SCr of 0.91 mg/dL).     Results from last 7 days   Lab Units 12/02/23  0508 12/01/23  0819 11/30/23  0421   CREATININE mg/dL 0.91 0.91 0.93   WBC 10*3/mm3 81.65* 85.16* 78.89*     Assessment/Plan  Steady state level returned at 21.3 mg/L with corresponding predicted AUC of >600. The regimen will be decreased.    Current Vancomycin Dose: decrease to vancomycin 750 mg IV every 24 hours; provides a predicted  mg/L.hr   Next Level Date and Time: Vanc Trough on 12/5 @ 1030  We will continue to monitor patient changes and renal function     Thank you for involving pharmacy in this patient's care. Please contact pharmacy with any questions or concerns.       Cheyenne Gowers, [facility]  Clinical Pharmacist  "

## 2023-12-02 NOTE — PROGRESS NOTES
Subjective     HISTORY OF PRESENT ILLNESS:   Patient states she rested well, feels like her breathing is better and feels more energetic today.    Past Medical History, Past Surgical History, Social History, Family History have been reviewed and are without significant changes except as mentioned.    Review of Systems   A comprehensive 14 point review of systems was performed and was negative except as mentioned.    Medications:  The current medication list was reviewed in the EMR    ALLERGIES:    Allergies   Allergen Reactions    Aspirin Unknown - Low Severity    Diphenhydramine Hcl Anxiety     Benadryl IVP    Hydroxyurea Other (See Comments)     Her hemoglobin drop and was stop in February 2022 and start taking Jakafi    Oxycodone Unknown - Low Severity       Objective      Vitals:    12/02/23 0325 12/02/23 0603 12/02/23 0712 12/02/23 0735   BP:    116/45   BP Location:    Right arm   Patient Position:    Sitting   Pulse: 75  69 81   Resp:   18 16   Temp:    97.9 °F (36.6 °C)   TempSrc:    Oral   SpO2: 95%  96% 97%   Weight:  61.4 kg (135 lb 5.8 oz)     Height:             10/18/2023     4:02 PM   Current Status   ECOG score 2       Physical Exam    CONSTITUTIONAL: pleasant well-developed elderly woman lying comfortably in bed no current distress  HEENT: no icterus, no thrush, moist membranes  LYMPH: no cervical or supraclavicular lad  CV: Regular rate  RESP: Breath sounds are diminished, high flow oxygen in place  MUSC: no edema   NEURO: alert and oriented x3, normal strength  PSYCH: normal mood and affect  Skin: Scattered ecchymoses and skin tears    RECENT LABS:  Hematology WBC   Date Value Ref Range Status   12/02/2023 81.65 (C) 3.40 - 10.80 10*3/mm3 Final     RBC   Date Value Ref Range Status   12/02/2023 3.09 (L) 3.77 - 5.28 10*6/mm3 Final     Hemoglobin   Date Value Ref Range Status   12/02/2023 9.5 (L) 12.0 - 15.9 g/dL Final     Hematocrit   Date Value Ref Range Status   12/02/2023 29.2 (L) 34.0 - 46.6 %  Final     Platelets   Date Value Ref Range Status   12/02/2023 299 140 - 450 10*3/mm3 Final     Lab Results   Component Value Date    GLUCOSE 84 12/02/2023    BUN 19 12/02/2023    CREATININE 0.91 12/02/2023    EGFRRESULT 75.7 03/21/2023    EGFR 62.0 12/02/2023    BCR 20.9 12/02/2023    K 4.5 12/02/2023    CO2 29.0 12/02/2023    CALCIUM 8.3 (L) 12/02/2023    PROTENTOTREF 7.0 03/21/2023    ALBUMIN 3.0 (L) 11/30/2023    BILITOT 0.9 11/30/2023    AST 15 11/30/2023    ALT 12 11/30/2023              Assessment & Plan   Patient is a pleasant 83-year-old female with medical history significant for JAK2 positive myeloproliferative disorder, perhaps primary myelofibrosis, A-fib on chronic Eliquis, CAD with stents, history of endocarditis and TIA admitted with nausea and vomiting.         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  This is likely primary myelofibrosis +/-steroids and infection related  11/29/2023 White count increasing-WBC 97.1, hemoglobin 7.1, platelets 273-restarted momelotinib with good tolerance; transfused 1 unit PRBCs  11/30/2023-WBC 78.8, hemoglobin 8.4, platelets 285  12/1/2023-WBC 85, hemoglobin 10.6, platelets 301  12/2/2023-WBC 81.65, hemoglobin 9.5, platelets 299       #MRSA bacteremia:  ID managing, on vancomycin    #Liver cirrhosis and splenomegaly  Possibly secondary to extramedullary hematopoiesis with liver involvement?     #Acute on chronic hypoxic respiratory failure secondary to diastolic heart failure/pulmonary hypertension and pneumonia/interstitial infiltrates  CT angiogram chest 11/24/2023-no PE, diffuse bilateral airspace opacities  It looks like the patient has history of airspace opacities before initiation of momelotinib; I do not see any kind of pneumonitis listed as a side effect, but does have cardiac and infectious risks  Could she have extra medullary hematopoiesis from her  myeloproliferative/myelofibrosis disorder in the lungs?     #A-fib currently with rapid rate on Eliquis and amiodarone started     Recommendations:  -Continue Ojjara given increasing leukocytosis/worsening anemia-could she have extra medullary hematopoiesis in the lungs?  -Daily CBC with differential, transfusion support as needed

## 2023-12-02 NOTE — PLAN OF CARE
Goal Outcome Evaluation:  Plan of Care Reviewed With: patient      Outcome Evaluation: No c/o pain this shift. Amio drip infusing. Lopressor given x1 for HR>130's. HR 90's -100's after IV Lopressor. Safety maintained.

## 2023-12-02 NOTE — PLAN OF CARE
Goal Outcome Evaluation:      A'ox4. Converted to sinus arrhythmia. Heart rate normal.Denies discomfort. Amiodarone gtt cont. Cardiology to see later today.

## 2023-12-02 NOTE — PROGRESS NOTES
LOS: 8 days   Patient Care Team:  Brett Jackson MD as PCP - General (Internal Medicine)  Gerard Foreman MD as Referring Physician (Medical Oncology)  Darrell Reinoso MD as Consulting Physician (Hematology and Oncology)  Albin Barriga MD as Cardiologist (Cardiology)  Richard Aldana, RN as     Chief Complaint:     F/u chf    Interval History:     She is still short winded and very weak.  She cannot stand for standing weight.  She does not feel her heart racing or skipping and she has no chest pain or pressure.  She denies nausea.    Objective   Vital Signs  Temp:  [97.9 °F (36.6 °C)-98.6 °F (37 °C)] 98 °F (36.7 °C)  Heart Rate:  [] 69  Resp:  [16-18] 18  BP: ()/(43-67) 99/43    Intake/Output Summary (Last 24 hours) at 12/2/2023 0815  Last data filed at 12/2/2023 0559  Gross per 24 hour   Intake --   Output 450 ml   Net -450 ml       Comfortable NAD  Neck supple, no JVD or thyromegaly appreciated  S1/S2 RRR, no m/r/g  Lungs decreased mid to base bilateral, normal effort  Abdomen S/NT/ND (+) BS, no HSM appreciated  Extremities warm, no clubbing, cyanosis, or edema  No visible or palpable skin lesions  A/Ox4, mood and affect appropriate    Results Review:      Results from last 7 days   Lab Units 12/02/23  0508 12/01/23  0819 11/30/23  0421   SODIUM mmol/L 136 137 139   POTASSIUM mmol/L 4.5 3.0* 4.0   CHLORIDE mmol/L 89* 88* 96*   CO2 mmol/L 29.0 29.6* 24.9   BUN mg/dL 19 14 21   CREATININE mg/dL 0.91 0.91 0.93   GLUCOSE mg/dL 84 115* 202*   CALCIUM mg/dL 8.3* 8.1* 7.6*     Results from last 7 days   Lab Units 11/30/23  0421 11/29/23  1108 11/29/23  0745   HSTROP T ng/L 27* 29* 32*     Results from last 7 days   Lab Units 12/02/23  0508 12/01/23  0819 11/30/23  0421   WBC 10*3/mm3 81.65* 85.16* 78.89*   HEMOGLOBIN g/dL 9.5* 10.6* 8.4*   HEMATOCRIT % 29.2* 32.6* 26.2*   PLATELETS 10*3/mm3 299 301 285             Results from last 7 days   Lab Units 12/01/23  0819   MAGNESIUM mg/dL 2.2            I reviewed the patient's new clinical results.  I personally viewed and interpreted the patient's EKG/Telemetry data        Medication Review:   apixaban, 5 mg, Oral, Q12H  aspirin, 81 mg, Oral, Daily  Eucerin original healing lotion, , Topical, Q12H  furosemide, 40 mg, Intravenous, Once  furosemide, 40 mg, Intravenous, Q8H  lactated ringers, 1,000 mL, Intravenous, Once  Menthol-Zinc Oxide, 1 application , Topical, Q12H  metoprolol succinate XL, 25 mg, Oral, Daily  Momelotinib Dihydrochloride, 200 mg, Oral, Q24H  pantoprazole, 40 mg, Oral, Q AM  predniSONE, 15 mg, Oral, Daily With Breakfast   Followed by  [START ON 12/6/2023] predniSONE, 10 mg, Oral, Daily With Breakfast   Followed by  [START ON 12/13/2023] predniSONE, 5 mg, Oral, Daily With Breakfast  senna-docusate sodium, 2 tablet, Oral, BID  sertraline, 100 mg, Oral, Daily  sodium chloride, 10 mL, Intravenous, Q12H  tiotropium bromide monohydrate, 2 puff, Inhalation, Daily - RT  vancomycin, 1,000 mg, Intravenous, Q24H        amiodarone, 0.5 mg/min, Last Rate: 0.5 mg/min (12/02/23 1783)  Pharmacy to dose vancomycin,         Assessment & Plan       MRSA bacteremia    CAD (coronary artery disease)    Paroxysmal atrial fibrillation    Myeloproliferative disorder    Type 2 diabetes mellitus with circulatory disorder, without long-term current use of insulin    CAP (community acquired pneumonia)    Acute hypoxic respiratory failure due to HFpEF (decompensated)-still has high oxygen requirements.  Recent atrial fibrillation with RVR-now normal sinus rhythm  Community acquired pneumonia , on abx  MRSA bacteremia , on IV vanc.   Myeloproliferative disorder with anemia, worsening. Status post transfusion 11/29/23 H/H stable.   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 Unchanged  Diabetes mellitus type II  CAD with diagonal stent for ISR 9/2022. Patent on cath 11/29/23.  Anemia.     Needs a standing weight-cannot stand on her own, will see if PT can get a  standing weight on her.  Change her Eliquis dosing-right now she is on 5 twice daily but she is 83 years old and her weight is around 60 kg-decrease to 2.5 mg twice daily    ECG now shows NSR with normal QT -changed to p.o. amiodarone.  IV Lasix 40 mg every 8 hours as her chest x-ray shows continued pulmonary edema.    Marimar Fink MD  12/02/23  08:15 EST

## 2023-12-02 NOTE — PROGRESS NOTES
Name: Catherine Boyer ADMIT: 2023   : 1940  PCP: Brett Jackson MD    MRN: [id number] LOS: 8 days   AGE/SEX: 83 y.o. female  ROOM: [facility]     Subjective     Patient resting in bed comfortably; her heart rate is improved/normalized. Remains on optiflow with decreasing FiO2.     Objective   Objective   Vital Signs  Temp:  [97.9 °F (36.6 °C)-98.6 °F (37 °C)] 97.9 °F (36.6 °C)  Heart Rate:  [] 81  Resp:  [16-18] 16  BP: ()/(43-67) 116/45  SpO2:  [94 %-98 %] 97 %  on  Flow (L/min):  [50] 50;   Device (Oxygen Therapy): humidified;high-flow nasal cannula  Body mass index is 24.76 kg/m².  Physical Exam  Constitutional:       General: She is in acute distress.      Appearance: She is ill-appearing and toxic-appearing.   HENT:      Head: Normocephalic and atraumatic.   Cardiovascular:      Rate and Rhythm: Normal rate and regular rhythm.   Pulmonary:      Effort: Respiratory distress (slight, decreased bs at bases) present.   Abdominal:      General: There is no distension.      Palpations: Abdomen is soft.      Tenderness: There is no abdominal tenderness.   Skin:     Coloration: Skin is pale.   Neurological:      Mental Status: She is alert.         Results Review     I reviewed the patient's new clinical results.  Results from last 7 days   Lab Units 23  05023   WBC 10*3/mm3 81.65* 85.16* 78.89* 97.19*   HEMOGLOBIN g/dL 9.5* 10.6* 8.4* 7.1*   PLATELETS 10*3/mm3 299 301 285 273     Results from last 7 days   Lab Units 23  05023   SODIUM mmol/L 136 137 139 134*   POTASSIUM mmol/L 4.5 3.0* 4.0 4.7   CHLORIDE mmol/L 89* 88* 96* 96*   CO2 mmol/L 29.0 29.6* 24.9 22.0   BUN mg/dL 19 14 21 20   CREATININE mg/dL 0.91 0.91 0.93 0.80   GLUCOSE mg/dL 84 115* 202* 69   Estimated Creatinine Clearance: 39 mL/min (by C-G formula based on SCr of 0.91 mg/dL).  Results from last 7 days   Lab Units 23  4811  "  ALBUMIN g/dL 3.0*   BILIRUBIN mg/dL 0.9   ALK PHOS U/L 155*   AST (SGOT) U/L 15   ALT (SGPT) U/L 12     Results from last 7 days   Lab Units 12/02/23  0508 12/01/23  0819 11/30/23  0421 11/29/23  0427   CALCIUM mg/dL 8.3* 8.1* 7.6* 8.0*   ALBUMIN g/dL  --   --  3.0*  --    MAGNESIUM mg/dL  --  2.2 1.7  --            COVID19   Date Value Ref Range Status   11/24/2023 Not Detected Not Detected - Ref. Range Final   11/08/2023 Not Detected Not Detected - Ref. Range Final     No results found for: \"HGBA1C\", \"POCGLU\"  Results for orders placed or performed during the hospital encounter of 11/23/23   Blood Culture - Blood, Arm, Left    Specimen: Arm, Left; Blood   Result Value Ref Range    Blood Culture No growth at 5 days          XR Chest 1 View  Narrative: XR CHEST 1 VW-     HISTORY: Female who is 83 years-old, dyspnea     TECHNIQUE: Frontal view of the chest     COMPARISON: 11/29/2023     FINDINGS: The heart size is borderline. Extensive bilateral infiltrates  appear similar to prior exam. There may be mild left pleural effusion.  No pneumothorax. No acute osseous process.     Impression: Extensive bilateral infiltrates appear similar to prior  exam.           This report was finalized on 11/30/2023 1:35 PM by Dr. Reilly Ritter M.D on Workstation: [id number]     Cardiac Catheterization/Vascular Study  CONCLUSION:  1.  Stable coronary artery disease:  Mild to moderate nonobstructive disease of the LAD and left circumflex   arteries  *Patent diagonal branch stent with no significant in-stent restenosis  *100% chronic total occlusion of the distal right coronary artery with   left-to-right collateral filling    RECOMMENDATIONS:  Continue medical management of coronary artery disease.    FINDINGS:  SELECTIVE CORONARY ANGIOGRAMS:  1.  Left main artery: Large-caliber vessel with 0% stenosis.  The vessel   bifurcates into left anterior descending and left circumflex arteries.  2.  Left anterior descending artery: " Moderate caliber vessel with 0%   proximal stenosis.  Proximal vessel gives rise to a moderate caliber first   diagonal branch.  There is a stent extending from the proximal to the mid   vessel that is widely patent with no significant in-stent restenosis.  The   ostium of the diagonal branch has a stable appearing 50% stenosis.  The   mid to distal LAD following the origin of the diagonal branch is a very   small caliber vessel with stable appearing 40 to 50% mid stenosis.  3.  Left circumflex artery: Proximally a large caliber vessel with 0%   stenosis.  Gives rise to a moderate caliber first obtuse marginal branch   with no significant disease.  The mid circumflex tapers to a moderate   caliber vessel with no significant disease and gives rise to a moderate   caliber second obtuse marginal branch with 30 to 40% proximal stenosis.    The distal continuation circumflex vessel tapers to small caliber with no   significant stenosis.  4.  Right coronary artery: Proximally large-caliber vessel.  There is   diffuse severe calcification extending from the proximal through the   distal vessel.  The distal vessel has a chronic 100% total occlusion.    There is left-to-right collateral filling of the posterior descending and   posterolateral branch.    PROCEDURE:  After informed consent was obtained the patient is brought to the cardiac   catheterization laboratory where her right wrist was prepped and draped in   a sterile manner.  1 mL of 2% lidocaine was infused subcutaneously to the   right wrist.  Access to the right radial artery is obtained using a   micropuncture needle followed by placement of a 5/6 [place] slender glide   sheath.    Had some difficulty advancing a J-wire through the proximal radial artery.    Was able to advance a BMW wire easily and advance the diagnostic catheter   over this wire.  At the innominate artery there was severe tortuosity and   difficulty advancing into the ascending aorta with a  J-wire.  It exchanged   out for a Glidewire which I was able to advance into the ascending aorta.    The remainder of the procedure was performed without any significant   issues.    Selective coronary angiogram to perform using a 5 German FL 3.5 and a 5   German FR4 diagnostic catheters.  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    Following clinician procedure all wires and catheters were removed.    Radial sheath was removed and a TR band placed.  After hemostasis was   achieved the patient was transferred to the recovery area in stable   condition.    Scheduled Medications  apixaban, 5 mg, Oral, Q12H  aspirin, 81 mg, Oral, Daily  Eucerin original healing lotion, , Topical, Q12H  furosemide, 40 mg, Intravenous, Once  furosemide, 40 mg, Intravenous, Q8H  lactated ringers, 1,000 mL, Intravenous, Once  Menthol-Zinc Oxide, 1 application , Topical, Q12H  metoprolol succinate XL, 25 mg, Oral, Daily  Momelotinib Dihydrochloride, 200 mg, Oral, Q24H  pantoprazole, 40 mg, Oral, Q AM  predniSONE, 15 mg, Oral, Daily With Breakfast   Followed by  [START ON 12/6/2023] predniSONE, 10 mg, Oral, Daily With Breakfast   Followed by  [START ON 12/13/2023] predniSONE, 5 mg, Oral, Daily With Breakfast  senna-docusate sodium, 2 tablet, Oral, BID  sertraline, 100 mg, Oral, Daily  sodium chloride, 10 mL, Intravenous, Q12H  tiotropium bromide monohydrate, 2 puff, Inhalation, Daily - RT  vancomycin, 1,000 mg, Intravenous, Q24H    Infusions  amiodarone, 0.5 mg/min, Last Rate: 0.5 mg/min (12/02/23 0508)  Pharmacy to dose vancomycin,     Diet  Diet: Regular/House Diet; Texture: Regular Texture (IDDSI 7); Fluid Consistency: Thin (IDDSI 0)       Assessment/Plan     Active Hospital Problems    Diagnosis  POA    **MRSA bacteremia [R78.81, B95.62]  Yes    CAP (community acquired pneumonia) [J18.9]  Yes    Myeloproliferative disorder [D47.1]  Yes    Type 2 diabetes mellitus with circulatory  disorder, 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       83 y.o. female admitted with MRSA bacteremia.    Acute hypoxic respiratory failure  Acute on chronic diastolic CHF exacerbation  Pulmonary HTN by echo  PAF/A fib with RVR  CAD w/ H/o PCI- cath 11/29 showed stent patency  - Cardiology consulted and pt underwent cath 11/29 with patent stent  - on IV lasix  - getting iv amiodarone, continue eliquis, metoprolol  -HR now ~60    Sepsis secondary to MRSA bacteremia   Lactic Acidosis   -Her white blood cell count is elevated, though this is partially due to the myelodysplastic syndrome.    -on IV Vanc, ID following/signed off   -Cardiology has seen. ECHO without change from previous echo so no indication for any CHERI or surgery with ok valves.   -Plan for 4 weeks of IV Vanc     Myelodysplastic syndrome  -Patient takes ojjaara. Oncology following and plan to restart. Monitor WBC and anemia.   Receiving PRN PRBC transfusion  - per oncology, questionable extra-medullary hematopoiesis could possibly be contributing to hypoxia     Lung infiltrates  Status post bronchoscopy on 11/8/2023 with biopsies.  Pathology showed no evidence of malignancy.  She was previously on IV steroids for prolonged course during her previous hospitalization and has then been transitioned to oral steroids and was discharged.  Starting to taper.  Plan was to have follow up CT chest 6-8 weeks from 11/10/23 with LPC  -Giving IV hydrocortisone x 1 as stress dose steroids with drop in BP 11/29  - pulm consulted 11/29    DM  A1c 6.6. Not currently on medications.     Eliquis for DVT prophylaxis.  DNR/DNI  Discussed with patient and nursing staff.   Anticipate discharge to SNU facility timing yet to be determined.       Chandni Luo MD  [facility]  12/02/23  10:18 EST

## 2023-12-03 NOTE — PLAN OF CARE
Goal Outcome Evaluation:              Outcome Evaluation: VSS. REMAINS ON OPTIFLOW. HR 'S SINUS ARRYTHMIA. UNEVENTFUL NIGHT.

## 2023-12-03 NOTE — PROGRESS NOTES
LOS: 9 days   Patient Care Team:  Brett Jackson MD as PCP - General (Internal Medicine)  Gerard Foreman MD as Referring Physician (Medical Oncology)  Darrell Reinoso MD as Consulting Physician (Hematology and Oncology)  Albin Barriga MD as Cardiologist (Cardiology)  Richard Aldana, RN as     Chief Complaint:     Follow-up atrial fibrillation    Interval History:     She is still short winded.  She is on high flow oxygen.  She has no chest pain or difficulty with dizziness.  She has no nausea.    Objective   Vital Signs  Temp:  [97.7 °F (36.5 °C)-98.1 °F (36.7 °C)] 97.7 °F (36.5 °C)  Heart Rate:  [] 90  Resp:  [16-20] 19  BP: (108-129)/(43-64) 129/64    Intake/Output Summary (Last 24 hours) at 12/3/2023 0950  Last data filed at 12/3/2023 0136  Gross per 24 hour   Intake 100 ml   Output 800 ml   Net -700 ml       Comfortable NAD  Neck supple, no JVD or thyromegaly appreciated  S1/S2 irregular with mild tachycardia, no m/r/g  Lungs decreased throughout, normal effort  Abdomen S/NT/ND (+) BS, no HSM appreciated  Extremities warm, no clubbing, cyanosis, or edema  No visible or palpable skin lesions  A/Ox4, mood and affect appropriate    Results Review:      Results from last 7 days   Lab Units 12/03/23  0433 12/02/23  0508 12/01/23  0819   SODIUM mmol/L 133* 136 137   POTASSIUM mmol/L 4.1 4.5 3.0*   CHLORIDE mmol/L 89* 89* 88*   CO2 mmol/L 27.4 29.0 29.6*   BUN mg/dL 22 19 14   CREATININE mg/dL 0.99 0.91 0.91   GLUCOSE mg/dL 78 84 115*   CALCIUM mg/dL 8.7 8.3* 8.1*     Results from last 7 days   Lab Units 11/30/23  0421 11/29/23  1108 11/29/23  0745   HSTROP T ng/L 27* 29* 32*     Results from last 7 days   Lab Units 12/03/23  0433 12/02/23  0508 12/01/23  0819   WBC 10*3/mm3 97.89* 81.65* 85.16*   HEMOGLOBIN g/dL 10.7* 9.5* 10.6*   HEMATOCRIT % 33.8* 29.2* 32.6*   PLATELETS 10*3/mm3 381 299 301             Results from last 7 days   Lab Units 12/01/23  0819   MAGNESIUM mg/dL 2.2           I  reviewed the patient's new clinical results.  I personally viewed and interpreted the patient's EKG/Telemetry data        Medication Review:   amiodarone, 200 mg, Oral, TID  apixaban, 2.5 mg, Oral, Q12H  aspirin, 81 mg, Oral, Daily  Eucerin original healing lotion, , Topical, Q12H  furosemide, 40 mg, Intravenous, Once  furosemide, 40 mg, Intravenous, Q8H  lactated ringers, 1,000 mL, Intravenous, Once  Menthol-Zinc Oxide, 1 application , Topical, Q12H  metoprolol succinate XL, 25 mg, Oral, Daily  Momelotinib Dihydrochloride, 200 mg, Oral, Q24H  pantoprazole, 40 mg, Oral, Q AM  predniSONE, 15 mg, Oral, Daily With Breakfast   Followed by  [START ON 12/6/2023] predniSONE, 10 mg, Oral, Daily With Breakfast   Followed by  [START ON 12/13/2023] predniSONE, 5 mg, Oral, Daily With Breakfast  senna-docusate sodium, 2 tablet, Oral, BID  sertraline, 100 mg, Oral, Daily  sodium chloride, 10 mL, Intravenous, Q12H  tiotropium bromide monohydrate, 2 puff, Inhalation, Daily - RT  vancomycin, 750 mg, Intravenous, Q24H        Pharmacy to dose vancomycin,         Assessment & Plan       MRSA bacteremia    CAD (coronary artery disease)    Paroxysmal atrial fibrillation    Myeloproliferative disorder    Type 2 diabetes mellitus with circulatory disorder, without long-term current use of insulin    CAP (community acquired pneumonia)    Acute hypoxic respiratory failure due to HFpEF (decompensated)-still has high oxygen requirements.  Recent atrial fibrillation with RVR-now normal sinus rhythm  Community acquired pneumonia , on abx  MRSA bacteremia , on IV vanc.   Myeloproliferative disorder with anemia, worsening. Status post transfusion 11/29/23 H/H stable.   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 Unchanged  Diabetes mellitus type II  CAD with diagonal stent for ISR 9/2022. Patent on cath 11/29/23.  Anemia.     ECG today shows sinus rhythm PACs, short runs of SVT and normal QT interval.  Atrial fibrillation is not noted.  Heart  rate got pretty high this morning up into the 180s per nursing.  She did receive 1 dose of IV digoxin.  She remains on oral amiodarone.    Blood pressure stable to low.  Sodium is lower.  Receiving IV Lasix.    She is not making a lot of progress with her lungs.  I really think that the pathology of her lungs is driving her heart rate.  Her lungs are not improving, her myeloproliferative disorder is making the situation more difficult and her heart rate then is being driven by the pathology of her lungs which I do not think is entirely heart failure but may also be an interstitial process.  The reason I think this is that she has not made any improvement with fairly high doses of IV diuretics.      After conversation with her and her family, they would like palliative care to be involved in moving towards comfort measures with transfer over to the palliative care unit.  The patient and the family are in agreement with this.  I spoke with Dr. gao about plan of care.        Marimar Fink MD  12/03/23  09:50 EST

## 2023-12-03 NOTE — PROGRESS NOTES
Name: Catherine Boyer ADMIT: 2023   : 1940  PCP: Brett Jackson MD    MRN: [id number] LOS: 9 days   AGE/SEX: 83 y.o. female  ROOM: [facility]     Subjective     Patient resting in bed, surrounded by family members. She is intermittently tearful, states she is tired.      Objective   Objective   Vital Signs  Temp:  [97.7 °F (36.5 °C)-98.2 °F (36.8 °C)] 98.2 °F (36.8 °C)  Heart Rate:  [] 97  Resp:  [17-20] 17  BP: ()/(43-72) 128/45  SpO2:  [93 %-97 %] 97 %  on  Flow (L/min):  [45] 45;   Device (Oxygen Therapy): high-flow nasal cannula;humidified  Body mass index is 25.42 kg/m².  Physical Exam  Constitutional:       General: She is in acute distress.      Appearance: She is ill-appearing and toxic-appearing.   HENT:      Head: Normocephalic and atraumatic.   Cardiovascular:      Rate and Rhythm: Normal rate and regular rhythm.   Pulmonary:      Effort: Respiratory distress (slight, decreased bs at bases) present.   Abdominal:      General: There is no distension.      Palpations: Abdomen is soft.      Tenderness: There is no abdominal tenderness.   Skin:     Coloration: Skin is pale.   Neurological:      Mental Status: She is alert.         Results Review     I reviewed the patient's new clinical results.  Results from last 7 days   Lab Units 23  0421   WBC 10*3/mm3 97.89* 81.65* 85.16* 78.89*   HEMOGLOBIN g/dL 10.7* 9.5* 10.6* 8.4*   PLATELETS 10*3/mm3 381 299 301 285     Results from last 7 days   Lab Units 23  0433 12/02/23  0508 23  0421   SODIUM mmol/L 133* 136 137 139   POTASSIUM mmol/L 4.1 4.5 3.0* 4.0   CHLORIDE mmol/L 89* 89* 88* 96*   CO2 mmol/L 27.4 29.0 29.6* 24.9   BUN mg/dL 22 19 14 21   CREATININE mg/dL 0.99 0.91 0.91 0.93   GLUCOSE mg/dL 78 84 115* 202*   Estimated Creatinine Clearance: 36.3 mL/min (by C-G formula based on SCr of 0.99 mg/dL).  Results from last 7 days   Lab Units 23  6986  "  ALBUMIN g/dL 3.0*   BILIRUBIN mg/dL 0.9   ALK PHOS U/L 155*   AST (SGOT) U/L 15   ALT (SGPT) U/L 12     Results from last 7 days   Lab Units 12/03/23  0433 12/02/23  0508 12/01/23  0819 11/30/23  0421   CALCIUM mg/dL 8.7 8.3* 8.1* 7.6*   ALBUMIN g/dL  --   --   --  3.0*   MAGNESIUM mg/dL  --   --  2.2 1.7           COVID19   Date Value Ref Range Status   11/24/2023 Not Detected Not Detected - Ref. Range Final   11/08/2023 Not Detected Not Detected - Ref. Range Final     No results found for: \"HGBA1C\", \"POCGLU\"  Results for orders placed or performed during the hospital encounter of 11/23/23   Blood Culture - Blood, Arm, Left    Specimen: Arm, Left; Blood   Result Value Ref Range    Blood Culture No growth at 5 days          XR Chest 1 View  Narrative: PORTABLE CHEST X-RAY     HISTORY: Follow-up pulmonary infiltrates.     TECHNIQUE: Portable chest x-ray is correlated with multiple recent prior  exams.     FINDINGS: There are clips from cholecystectomy and advanced arthritic  change of both shoulders.     The cardiomediastinal contours appear normal. There appears to be some  pleural fluid in the lower left hemithorax. Opacity in the lungs  bilaterally with a perihilar configuration is again observed and does  not appear significantly changed in comparison with the 11/30/2023 exam.     Impression: Bilateral pulmonary opacities appear similar as on recent  x-rays. There may be a small left pleural effusion. No new abnormality.     This report was finalized on 12/2/2023 1:45 PM by Dr. Charles Tirado M.D on Workstation: [id number]       Scheduled Medications  Eucerin original healing lotion, , Topical, Q12H  Menthol-Zinc Oxide, 1 application , Topical, Q12H  predniSONE, 15 mg, Oral, Daily With Breakfast   Followed by  [START ON 12/6/2023] predniSONE, 10 mg, Oral, Daily With Breakfast   Followed by  [START ON 12/13/2023] predniSONE, 5 mg, Oral, Daily With Breakfast  sodium chloride, 10 mL, Intravenous, " Q12H    Infusions     Diet  Diet: Regular/House Diet; Texture: Regular Texture (IDDSI 7); Fluid Consistency: Thin (IDDSI 0)       Assessment/Plan     Active Hospital Problems    Diagnosis  POA    **MRSA bacteremia [R78.81, B95.62]  Yes    CAP (community acquired pneumonia) [J18.9]  Yes    Myeloproliferative disorder [D47.1]  Yes    Type 2 diabetes mellitus with circulatory disorder, 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     83-year-old woman with a history of myeloproliferative disorder, chronic hypoxic respiratory failure on 3 L, chronic diastolic CHF, pulmonary hypertension, paroxysmal atrial fibrillation, CAD with history of PCI, diabetes who presented to the hospital with nausea, vomiting, abdominal pain.  She was found to have MRSA bacteremia and was initiated on vancomycin.  No clear source of MRSA bacteremia was ever determined.  While hospitalized the patient had worsening respiratory failure and ultimately required Optiflow to maintain saturations.  Hospital course also complicated by atrial fibrillation with RVR/difficult to control rates.  Pulmonology, cardiology, oncology and infectious disease saw the patient in consultation.  After discussion with the patient and her children she has ultimately decided to transition to comfort care.  Comfort care order set has been placed, transfer requested to [facility] [place] [place] when a bed is available.  Discussed with patient, family, primary RN, Soha Fink, Agustín, and Michell. Hospice consult placed.     Acute on chronic hypoxic respiratory failure  Acute on chronic diastolic CHF exacerbation  Pulmonary HTN by echo  PAF/A fib with RVR  CAD w/ H/o PCI- cath 11/29 showed stent patency  Sepsis secondary to MRSA bacteremia   Lactic Acidosis   Myelodysplastic syndrome   Lung infiltrates  DM    Comfort  care  Discussed with patient and nursing staff and other providers as above.   Anticipate patient will remain at [facility] for end of life care.       Chandni Luo MD  [facility]  12/03/23  15:00 EST

## 2023-12-03 NOTE — PROGRESS NOTES
Subjective     HISTORY OF PRESENT ILLNESS:   The patient is clearly getting weaker, still requiring high flow oxygen.  She denies pain.  She denies nausea or vomiting.    Past Medical History, Past Surgical History, Social History, Family History have been reviewed and are without significant changes except as mentioned.    Review of Systems   A comprehensive 14 point review of systems was performed and was negative except as mentioned.    Medications:  The current medication list was reviewed in the EMR    ALLERGIES:    Allergies   Allergen Reactions    Aspirin Unknown - Low Severity    Diphenhydramine Hcl Anxiety     Benadryl IVP    Hydroxyurea Other (See Comments)     Her hemoglobin drop and was stop in February 2022 and start taking Jakafi    Oxycodone Unknown - Low Severity       Objective      Vitals:    12/03/23 0240 12/03/23 0621 12/03/23 0729 12/03/23 0810   BP:    129/64   BP Location:    Right arm   Patient Position:    Lying   Pulse: 104  112 90   Resp:   20 19   Temp:    97.7 °F (36.5 °C)   TempSrc:    Oral   SpO2: 94%  93% 94%   Weight:  63 kg (139 lb)     Height:             10/18/2023     4:02 PM   Current Status   ECOG score 2       Physical Exam    CONSTITUTIONAL: pleasant but ill-appearing elderly woman lying in bed  HEENT: no icterus, no thrush, moist membranes  LYMPH: no cervical or supraclavicular lad  CV: Tachycardic  RESP: Breath sounds are diminished, high flow oxygen in place, no respiratory distress  MUSC: no edema   NEURO: alert and oriented x3, moderate global weakness  PSYCH: Anxious mood and affect  Skin: Scattered ecchymoses and skin tears    RECENT LABS:  Hematology WBC   Date Value Ref Range Status   12/03/2023 97.89 (C) 3.40 - 10.80 10*3/mm3 Final     RBC   Date Value Ref Range Status   12/03/2023 3.63 (L) 3.77 - 5.28 10*6/mm3 Final     Hemoglobin   Date Value Ref Range Status   12/03/2023 10.7 (L) 12.0 - 15.9 g/dL Final     Hematocrit   Date Value Ref Range Status   12/03/2023  33.8 (L) 34.0 - 46.6 % Final     Platelets   Date Value Ref Range Status   12/03/2023 381 140 - 450 10*3/mm3 Final     Lab Results   Component Value Date    GLUCOSE 78 12/03/2023    BUN 22 12/03/2023    CREATININE 0.99 12/03/2023    EGFRRESULT 75.7 03/21/2023    EGFR 56.0 (L) 12/03/2023    BCR 22.2 12/03/2023    K 4.1 12/03/2023    CO2 27.4 12/03/2023    CALCIUM 8.7 12/03/2023    PROTENTOTREF 7.0 03/21/2023    ALBUMIN 3.0 (L) 11/30/2023    BILITOT 0.9 11/30/2023    AST 15 11/30/2023    ALT 12 11/30/2023              Assessment & Plan   Patient is a pleasant 83-year-old female with medical history significant for JAK2 positive myeloproliferative disorder, perhaps primary myelofibrosis, A-fib on chronic Eliquis, CAD with stents, history of endocarditis and TIA admitted with nausea and vomiting.         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  This is likely primary myelofibrosis +/-steroids and infection related  11/29/2023 White count increasing-WBC 97.1, hemoglobin 7.1, platelets 273-restarted momelotinib with good tolerance; transfused 1 unit PRBCs  11/30/2023-WBC 78.8, hemoglobin 8.4, platelets 285  12/1/2023-WBC 85, hemoglobin 10.6, platelets 301  12/2/2023-WBC 81.65, hemoglobin 9.5, platelets 299  12/3/2023-WBC 97.8, hemoglobin 10.7, platelets 381     #MRSA bacteremia:  ID managing, on vancomycin    #Liver cirrhosis and splenomegaly  Possibly secondary to extramedullary hematopoiesis with liver involvement?     #Acute on chronic hypoxic respiratory failure secondary to diastolic heart failure/pulmonary hypertension and pneumonia/interstitial infiltrates  CT angiogram chest 11/24/2023-no PE, diffuse bilateral airspace opacities  It looks like the patient has history of airspace opacities before initiation of momelotinib; I do not see any kind of pneumonitis listed as a side effect, but does have cardiac and  infectious risks  Could she have extra medullary hematopoiesis from her myeloproliferative/myelofibrosis disorder in the lungs?     #A-fib currently with rapid rate on Eliquis 2.5 every 12 and amiodarone started     Recommendations:  This patient has respiratory failure not responding very well to treatment, very weak and notably weaker over the course of the past 3 to 4 days.  I discussed with her daughter that likely her prognosis is poor and would not be surprised if palliative/terminal care needed in the near future.  The patient is DNR.  Her daughter understands the poor prognosis and states all the children are understanding of the prognosis but the patient is having anxiety and difficulty accepting.

## 2023-12-03 NOTE — PROGRESS NOTES
[facility]  [phone]  Dr. Sundeep Galarza     Subjective:  LOS: 9    Chief Complaint:  nausea/vomiting     Patient seen today with family at bedside.  The patient and her family have all had multiple discussions regarding goals of care.  And they have arrived at going with comfort measures only and being moved to the palliative care floor.  I told them that pulmonary team would be available should they have any questions or concerns.  All questions were answered.    Objective   Vital Signs past 24hrs  Temp range: Temp (24hrs), Av.8 °F (36.6 °C), Min:97.7 °F (36.5 °C), Max:98.1 °F (36.7 °C)    BP range: BP: (108-129)/(43-64) 129/64  Pulse range: Heart Rate:  [] 90  Resp rate range: Resp:  [16-20] 19  Device (Oxygen Therapy): high-flow nasal cannulaFlow (L/min):  [45-50] 45  Oxygen range:SpO2:  [93 %-96 %] 94 %     Physical Exam  Vitals reviewed.   Constitutional:       General: She is not in acute distress.     Appearance: Normal appearance.   HENT:      Head: Normocephalic and atraumatic.   Eyes:      Extraocular Movements: Extraocular movements intact.      Conjunctiva/sclera: Conjunctivae normal.      Pupils: Pupils are equal, round, and reactive to light.   Cardiovascular:      Rate and Rhythm: Normal rate and regular rhythm.      Heart sounds: No murmur heard.     No friction rub. No gallop.   Pulmonary:      Effort: Pulmonary effort is normal. No respiratory distress.      Breath sounds: Decreased breath sounds (bilaterally) and rales (bilaterally) present. No wheezing or rhonchi.   Abdominal:      General: Abdomen is flat.      Palpations: Abdomen is soft.      Tenderness: There is no abdominal tenderness.   Musculoskeletal:         General: No swelling or tenderness. Normal range of motion.      Cervical back: Normal range of motion. No rigidity or tenderness.   Skin:     General: Skin is warm and dry.      Findings: No rash.   Neurological:      General: No focal deficit  present.      Mental Status: She is alert and oriented to person, place, and time.   Psychiatric:         Mood and Affect: Mood normal.         Behavior: Behavior normal.         Results Review:    I have reviewed the laboratory and imaging data since the last note by formerly [facility] physician.  My annotations are noted in assessment and plan.      Result Review:  I have personally reviewed the results from last note by formerly [facility] physician to 12/3/2023 08:23 EST and agree with these findings:  [x]  Laboratory list / accordion  [x]  Microbiology  [x]  Radiology  [x]  EKG/Telemetry   [x]  Cardiology/Vascular   [x]  Pathology  [x]  Old records  []  Other:    Medication Review:  I have reviewed the current MAR.  My annotations are noted in assessment and plan.    amiodarone, 200 mg, Oral, TID  apixaban, 2.5 mg, Oral, Q12H  aspirin, 81 mg, Oral, Daily  Eucerin original healing lotion, , Topical, Q12H  furosemide, 40 mg, Intravenous, Once  furosemide, 40 mg, Intravenous, Q8H  lactated ringers, 1,000 mL, Intravenous, Once  Menthol-Zinc Oxide, 1 application , Topical, Q12H  metoprolol succinate XL, 25 mg, Oral, Daily  Momelotinib Dihydrochloride, 200 mg, Oral, Q24H  pantoprazole, 40 mg, Oral, Q AM  predniSONE, 15 mg, Oral, Daily With Breakfast   Followed by  [START ON 12/6/2023] predniSONE, 10 mg, Oral, Daily With Breakfast   Followed by  [START ON 12/13/2023] predniSONE, 5 mg, Oral, Daily With Breakfast  senna-docusate sodium, 2 tablet, Oral, BID  sertraline, 100 mg, Oral, Daily  sodium chloride, 10 mL, Intravenous, Q12H  tiotropium bromide monohydrate, 2 puff, Inhalation, Daily - RT  vancomycin, 750 mg, Intravenous, Q24H        Pharmacy to dose vancomycin,       Lines, Drains & Airways       Active LDAs       Name Placement date Placement time Site Days    PICC Single Lumen 11/29/23 Left Brachial 11/29/23 1809  Brachial  3    External Urinary Catheter --  --  --  --                  Contact  Diet Orders (active) (From admission, onward)        Start     Ordered    11/30/23 1800  Dietary Nutrition Supplements Boost Glucose Control (Glucerna Shake)  Daily With Breakfast, Lunch & Dinner       11/30/23 1456    11/30/23 1457  Diet: Regular/House Diet; Texture: Regular Texture (IDDSI 7); Fluid Consistency: Thin (IDDSI 0)  Diet Effective Now         11/30/23 1457                    83 y.o. female with a history of CAD, Myeloproliferative disorder, PAF, Type 2 DM, chronic diastolic CHF, chronic hypoxic respiratory failure on 3L NC who presented to hospital with nausea, vomiting starting the day of arrival. She was just recently discharged on the day prior to presentation after a prolonged hospital course of 20 days with complicated course including ESBL UTI, acute renal failure, lung mass which required a bronchoscopy which was unfortunately complicated by an iatrogenic pneumothorax.  The bronchoscopy did not show any evidence of malignancy, nor was there any evidence of interstitial lung disease. On this admission she was found to have MRSA bacteremia and hypoxia requiring HHFNC with bilateral diffuse airspace opacities. Pulmonary is consulted regarding respiratory failure      Assessment  Acute on chronic hypoxic respiratory failure (baseline 3L NC)  Acute heart failure preserved ejection fraction  Bilateral pulmonary infiltrates acutely I think she had some CHF but these infiltrates are chronic and etiology is uncertain.    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 Cannot entirely rule out another interstitial process/ILD or relation to her hematologic malignancy.   - Right now she is certainly not a good candidate for lung biopsy or even repeat bronchoscopy.  We may have to talk and consider empiric steroids for a progressive interstitial process if she does not improve   - antibiotics per infectious disease  - continue aggressive diuresis as patient tolerates   - currently oxygen requirements improving, continue to wean oxygen for goal O2 saturation >90%    Patient and family have elected to move forward with comfort care and transition to palliative care floor.  I think this is a reasonable option given that even if her respiratory status improves she still has multiple comorbidities and I find it difficult to see away where she truly gets better and back to being independent.  Pulmonary will be available for questions and follow peripherally at this time.        Sundeep Galarza DO   12/03/23  08:23 EST      Part of this note may be an electronic transcription/translation of spoken language to printed text using the Dragon Dictation System.

## 2023-12-03 NOTE — PLAN OF CARE
Goal Outcome Evaluation:              Outcome Evaluation: A/ox4, able to voice own needs and wants. Denies any pain or discomfort. Maintaining sats on optiflo. Turned/repositioned. Pt appears to be resting comfortably. Lots of family visiting with pt at the bedside. Awaiting transfer to palliative care unit once bed is available. Bed alarm utilized. Safety maintained.

## 2023-12-04 NOTE — PROGRESS NOTES
SW met with family present at bedside (two daughters and one sister) for introduction of the social work role and to assess any ongoing support needs. Family in good spirits and coping well at this time. Patient has a large family, with many family members coming in to visit and spend as much time with Ms. Boyer as they are able to. Patient has also been able to facetime with family members that have not been able to make it to the hospital, as she has felt well enough to do so. Support provided as family shared stories. Provided education on SW role and availability moving forward if there are any needs. Patient and family are well supported at this time.

## 2023-12-04 NOTE — PROGRESS NOTES
Subjective     HISTORY OF PRESENT ILLNESS:   The patient is clearly getting weaker, still requiring high flow oxygen.  She denies pain.  She denies nausea or vomiting.    Interval history:  12/4/2023  Patient and family members agreeable for pursuing palliative care/hospice care.  Patient is afebrile.  She denies pains.  She is little sleepy due to medication.      Past Medical History, Past Surgical History, Social History, Family History have been reviewed and are without significant changes except as mentioned.    Review of Systems   A comprehensive 14 point review of systems was performed and was negative except as mentioned.    Medications:  The current medication list was reviewed in the EMR    ALLERGIES:    Allergies   Allergen Reactions    Aspirin Unknown - Low Severity    Diphenhydramine Hcl Anxiety     Benadryl IVP    Hydroxyurea Other (See Comments)     Her hemoglobin drop and was stop in February 2022 and start taking Jakafi    Oxycodone Unknown - Low Severity       Objective      Vitals:    12/03/23 2220 12/04/23 0449 12/04/23 0859 12/04/23 1233   BP:  131/67     BP Location:  Right arm     Patient Position:  Sitting     Pulse:  90 88 104   Resp:  16 18 18   Temp:  97.7 °F (36.5 °C)     TempSrc:  Oral     SpO2: 93% 100% 94% 93%   Weight:       Height:             10/18/2023     4:02 PM   Current Status   ECOG score 2       Physical Exam    GENERAL:  Well-developed, thin elderly female lying in bed, in no acute distress.  Orientated to time place and the people.  SKIN:  Warm, dry without rashes.  HEENT:  Normocephalic.   CHEST: Normal respiratory effort.  .  EXTREMITIES:  No lower extremity edema.      RECENT LABS:  Hematology WBC   Date Value Ref Range Status   12/03/2023 97.89 (C) 3.40 - 10.80 10*3/mm3 Final     RBC   Date Value Ref Range Status   12/03/2023 3.63 (L) 3.77 - 5.28 10*6/mm3 Final     Hemoglobin   Date Value Ref Range Status   12/03/2023 10.7 (L) 12.0 - 15.9 g/dL Final     Hematocrit    Date Value Ref Range Status   12/03/2023 33.8 (L) 34.0 - 46.6 % Final     Platelets   Date Value Ref Range Status   12/03/2023 381 140 - 450 10*3/mm3 Final     Lab Results   Component Value Date    GLUCOSE 78 12/03/2023    BUN 22 12/03/2023    CREATININE 0.99 12/03/2023    EGFRRESULT 75.7 03/21/2023    EGFR 56.0 (L) 12/03/2023    BCR 22.2 12/03/2023    K 4.1 12/03/2023    CO2 27.4 12/03/2023    CALCIUM 8.7 12/03/2023    PROTENTOTREF 7.0 03/21/2023    ALBUMIN 3.0 (L) 11/30/2023    BILITOT 0.9 11/30/2023    AST 15 11/30/2023    ALT 12 11/30/2023              Assessment & Plan   Patient is a pleasant 83-year-old female with medical history significant for JAK2 positive myeloproliferative disorder, perhaps primary myelofibrosis, A-fib on chronic Eliquis, CAD with stents, history of endocarditis and TIA admitted with nausea and vomiting.         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  This is likely primary myelofibrosis +/-steroids and infection related  11/29/2023 White count increasing-WBC 97.1, hemoglobin 7.1, platelets 273-restarted momelotinib with good tolerance; transfused 1 unit PRBCs  11/30/2023-WBC 78.8, hemoglobin 8.4, platelets 285  12/1/2023-WBC 85, hemoglobin 10.6, platelets 301  12/2/2023-WBC 81.65, hemoglobin 9.5, platelets 299  12/3/2023-WBC 97.8, hemoglobin 10.7, platelets 381  Patient agreed to proceed with palliative care on 12/3/2023.     #MRSA bacteremia:  ID managing, on vancomycin    #Liver cirrhosis and splenomegaly  Possibly secondary to extramedullary hematopoiesis with liver involvement?     #Acute on chronic hypoxic respiratory failure secondary to diastolic heart failure/pulmonary hypertension and pneumonia/interstitial infiltrates  CT angiogram chest 11/24/2023-no PE, diffuse bilateral airspace opacities  It looks like the patient has history of airspace opacities before initiation of  momelotinib; I do not see any kind of pneumonitis listed as a side effect, but does have cardiac and infectious risks  Could she have extra medullary hematopoiesis from her myeloproliferative/myelofibrosis disorder in the lungs?  Patient and family member agreed 12/3/2023 for palliative care.      #A-fib currently with rapid rate on Eliquis 2.5 every 12 and amiodarone started     Recommendations:  Continue palliative care.      This patient is complete new to me for all the problem listed above.  I reviewed medical records.  Discussed with patient and her daughter at the bedside.  They are comfortable following decision seeking palliative care/hospice care.    Also spoke with nursing staff.    We will sign off.      MELONIE BROOKE M.D., Ph.D.

## 2023-12-04 NOTE — CONSULTS
Assessed patient at bedside. Patient Awake, alert and oriented. She is able to express her wishes and make decisions.      Met with several family members, daughters and sister.  First and then met with patient. They have good understanding of patient's clinical condition. Discussed comfort care process as high flow oxygen is not a comfort to patient and is life prolonging, with hospice we would work to wean her off that and optimize comfort with medications. They all v/u and express desire to have hospice care.     Explanation of services provided with focus on comfort focused care and plan to wean oxygen. They have a few family members getting into town and will not be here until 12/noon tomorrow 12/5. They would like to wait and wean until then. Discussed patient with [facility]. OOD who states to revaluate for GIP tomorrow as patient[facility]30 [facility]. Answered all questions, discussed medications, symptom management needs, and goals of care. [facility] information provided and encouraged to call with any needs.    Updated KARRI Rodriguez and DYLAN Armas. Please call with any questions, concerns, or change in patient status. Thank you for allowing us the opportunity to participate in the care of this patient/family.    Annika Bradley RN, BSN, CHPN  Referral and Admission Coordinator  [facility]  [phone]

## 2023-12-04 NOTE — PLAN OF CARE
Goal Outcome Evaluation:  Plan of Care Reviewed With: patient           Outcome Evaluation: Patient AOx4, requested one dose of 0.5mg SL Ativan for anxiety with relief of symptoms. Patient with daughter at bedside, oriented to palliative care unit, all questions answered. Patient on optiflo per her request for comfort. No new symptoms reported, will continue comfort measures.

## 2023-12-04 NOTE — CONSULTS
Patient transferred to the palliative care unit following goals of care and treatment preferences discussion with Dr. Luo.  Patient and/or family state goal of care to be comfort and treatment preferences to be geared toward symptom management.  The palliative care team will  follow for further support and discussion as needed.

## 2023-12-04 NOTE — CONSULTS
Visit with patient at bedside. She was sleeping soundly and appeared to be comfortable. Two daughters and one sister at bedside.  Patient is Yazidi and has not been anointed but they will call their own . Patient is still responsive and sharing her humor with those she loves. Family is aware that chaplains are available for continued care and support.

## 2023-12-04 NOTE — PROGRESS NOTES
Name: Catherine Boyer ADMIT: 2023   : 1940  PCP: Brett Jackson MD    MRN: [id number] LOS: 10 days   AGE/SEX: 83 y.o. female  ROOM: [facility]     Subjective   Subjective   Patient laying in bed, her daughter is at bedside.  She received some IV Ativan this morning for agitation.  She is resting comfortably now.       Objective   Objective   Vital Signs  Temp:  [97.7 °F (36.5 °C)-98.4 °F (36.9 °C)] 97.7 °F (36.5 °C)  Heart Rate:  [] 104  Resp:  [16-18] 18  BP: (130-131)/(51-67) 131/67  SpO2:  [93 %-100 %] 93 %  on  Flow (L/min):  [40-45] 40;   Device (Oxygen Therapy): heated;high-flow nasal cannula  Body mass index is 25.42 kg/m².  Physical Exam  Constitutional:       General: She is in acute distress.      Appearance: She is ill-appearing and toxic-appearing.   HENT:      Head: Normocephalic and atraumatic.   Cardiovascular:      Rate and Rhythm: Normal rate and regular rhythm.   Pulmonary:      Effort: Respiratory distress (slight, decreased bs at bases) present.   Abdominal:      General: There is no distension.      Palpations: Abdomen is soft.      Tenderness: There is no abdominal tenderness.   Skin:     Coloration: Skin is pale.         Results Review     I reviewed the patient's new clinical results.  Results from last 7 days   Lab Units 23  0421   WBC 10*3/mm3 97.89* 81.65* 85.16* 78.89*   HEMOGLOBIN g/dL 10.7* 9.5* 10.6* 8.4*   PLATELETS 10*3/mm3 381 299 301 285     Results from last 7 days   Lab Units 23  0421   SODIUM mmol/L 133* 136 137 139   POTASSIUM mmol/L 4.1 4.5 3.0* 4.0   CHLORIDE mmol/L 89* 89* 88* 96*   CO2 mmol/L 27.4 29.0 29.6* 24.9   BUN mg/dL 22 19 14 21   CREATININE mg/dL 0.99 0.91 0.91 0.93   GLUCOSE mg/dL 78 84 115* 202*   Estimated Creatinine Clearance: 36.3 mL/min (by C-G formula based on SCr of 0.99 mg/dL).  Results from last 7 days   Lab Units 23  5642  "  ALBUMIN g/dL 3.0*   BILIRUBIN mg/dL 0.9   ALK PHOS U/L 155*   AST (SGOT) U/L 15   ALT (SGPT) U/L 12     Results from last 7 days   Lab Units 12/03/23  0433 12/02/23  0508 12/01/23  0819 11/30/23  0421   CALCIUM mg/dL 8.7 8.3* 8.1* 7.6*   ALBUMIN g/dL  --   --   --  3.0*   MAGNESIUM mg/dL  --   --  2.2 1.7       COVID19   Date Value Ref Range Status   11/24/2023 Not Detected Not Detected - Ref. Range Final   11/08/2023 Not Detected Not Detected - Ref. Range Final     No results found for: \"HGBA1C\", \"POCGLU\"    XR Chest 1 View  Narrative: PORTABLE CHEST X-RAY     HISTORY: Follow-up pulmonary infiltrates.     TECHNIQUE: Portable chest x-ray is correlated with multiple recent prior  exams.     FINDINGS: There are clips from cholecystectomy and advanced arthritic  change of both shoulders.     The cardiomediastinal contours appear normal. There appears to be some  pleural fluid in the lower left hemithorax. Opacity in the lungs  bilaterally with a perihilar configuration is again observed and does  not appear significantly changed in comparison with the 11/30/2023 exam.     Impression: Bilateral pulmonary opacities appear similar as on recent  x-rays. There may be a small left pleural effusion. No new abnormality.     This report was finalized on 12/2/2023 1:45 PM by Dr. Charles Tirado M.D on Workstation: [id number]       Scheduled Medications  Eucerin original healing lotion, , Topical, Q12H  Menthol-Zinc Oxide, 1 application , Topical, Q12H  predniSONE, 15 mg, Oral, Daily With Breakfast   Followed by  [START ON 12/6/2023] predniSONE, 10 mg, Oral, Daily With Breakfast   Followed by  [START ON 12/13/2023] predniSONE, 5 mg, Oral, Daily With Breakfast  sodium chloride, 10 mL, Intravenous, Q12H    Infusions   Diet  Diet: Regular/House Diet; Texture: Regular Texture (IDDSI 7); Fluid Consistency: Thin (IDDSI 0)           Assessment/Plan     Active Hospital Problems    Diagnosis  POA    **MRSA bacteremia [R78.81, B95.62]  " Yes    CAP (community acquired pneumonia) [J18.9]  Yes    Myeloproliferative disorder [D47.1]  Yes    Type 2 diabetes mellitus with circulatory disorder, 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       83-year-old woman with a history of myeloproliferative disorder, chronic hypoxic respiratory failure on 3 L, chronic diastolic CHF, pulmonary hypertension, paroxysmal atrial fibrillation, CAD with history of PCI, diabetes who presented w/ n/v, abdominal pain.  She was diagnosed with MRSA bacteremia, after prolonged hospitalization she/her family have decided to transition to comfort care.      Discussed with patient's daughter and primary RN, patient's goal is to be awake/alert enough to visit with her many family members who have come to the visit.  She has significant anxiety and this was discussed with her nurse, palliative order set with IV pain and anxiety medications have been ordered.  Patient's family is concerned/does not want the patient to feel short of breath, discussed using morphine for air hunger.  Patient may continue Optiflow for comfort.  Hospice consult is pending.    MRSA bacteremia  Acute on chronic hypoxic respiratory failure  Acute on chronic diastolic CHF exacerbation  Pulmonary HTN by echo  PAF/A fib with RVR  CAD w/ H/o PCI- cath 11/29 showed stent patency  Sepsis secondary to MRSA bacteremia   Lactic Acidosis   Myelodysplastic syndrome   Lung infiltrates  DM    CODE STATUS: Comfort care  Discussed with family and nursing staff.    Chandni Luo MD  [facility]  12/04/23  14:41 EST    I wore protective equipment throughout this patient encounter including gloves.  Hand hygiene was performed before donning protective equipment and after removal when leaving the room.    Expected Discharge Date  and Time       Expected Discharge Date Expected Discharge Time    Dec 4, 2023              Copied text in this note has been reviewed and is accurate as of 12/04/23.

## 2023-12-04 NOTE — PROGRESS NOTES
Discharge Planning Assessment  [facility]     Patient Name: Catherine Boyer  MRN: [id number]  Today's Date: 12/4/2023    Admit Date: 11/23/2023    Plan: DC to [facility], will need ambulance transport   Discharge Needs Assessment    No documentation.                  Discharge Plan       Row Name 12/04/23 1546       Plan    Plan Comments The patient transferred to [facility] from [address] [place] [address] on 12/3/23. The patient is palliative. [facility] to evaluate. The patient is from [facility] skilled bed. CCP will continue to follow for any needs that may arise. FORTINO Jensen RN, CCP.                  Continued Care and Services - Admitted Since 11/23/2023       Destination       Service Provider Request Status Selected Services Address Phone Fax Patient Preferred    [facility] Accepted N/A [address] [address] [phone] --              Home Medical Care       Service Provider Request Status Selected Services Address Phone Fax Patient Preferred    [facility] Accepted N/A [address] [address] [phone] --                  Selected Continued Care - Episodes Includes continued care and service providers with selected services from the active episodes listed below      Oncology- External Fill Episode start date: 10/10/2023   There are no active outsourced providers for this episode.                 Selected Continued Care - Prior Encounters Includes continued care and service providers with selected services from prior encounters from 8/25/2023 to 12/4/2023      Discharged on 11/22/2023 Admission date: 11/2/2023 - Discharge disposition: Skilled Nursing Facility (DC - External)      Destination       Service Provider Selected Services Address Phone Fax Patient Preferred    [facility]illed Nursing [address] [address] [phone] --               Home Medical Care       Service Provider Selected Services Address Phone Fax Patient Preferred    [facility] [address], [address], [facility] [address] [phone] --                      Discharged on 10/2/2023 Admission date: 9/27/2023 - Discharge disposition: Home-Health Care [facility]      Home Medical Care       Service Provider Selected Services Address Phone Fax Patient Preferred    [facility] [address], [address], [facility] [address] [phone] --                          Expected Discharge Date and Time       Expected Discharge Date Expected Discharge Time    Dec 4, 2023            Demographic Summary    No documentation.                  Functional Status    No documentation.                  Psychosocial    No documentation.                  Abuse/Neglect    No documentation.                  Legal    No documentation.                  Substance Abuse    No documentation.                  Patient Forms    No documentation.                     Chanda Jensen RN

## 2023-12-05 PROBLEM — J96.01 ACUTE RESPIRATORY FAILURE WITH HYPOXIA: Status: ACTIVE | Noted: 2023-01-01

## 2023-12-05 PROBLEM — I48.91 ATRIAL FIBRILLATION WITH RVR: Status: ACTIVE | Noted: 2023-01-01

## 2023-12-05 PROBLEM — D46.9 MDS (MYELODYSPLASTIC SYNDROME): Status: ACTIVE | Noted: 2022-07-14

## 2023-12-05 NOTE — PLAN OF CARE
Goal Outcome Evaluation:  Plan of Care Reviewed With: patient        Progress: no change  Outcome Evaluation: Patient c/o generalized itching, received PRN order for po Atarax with relief of itching. Patient with BM overnight. Given one dose of 0.5mg SL Ativan for anxiety per patient request. Patient AOx4, slept most of night, family at bedside throughout shift, denies pain, will continue comfort nmeasures.

## 2023-12-05 NOTE — CONSULTS
Daughter of patient asked for communion from their [facility]. [place] called and asked that Deacon Carrillo visit. Daughter sad and worried that her Mom doesn't want to receive sacrament of the sick. Prayer and conversation offered for peace and comfort. Family is aware that chaplains are available for continued support.

## 2023-12-05 NOTE — H&P
Patient Name:  Catherine Boyer  YOB: 1940  MRN:  [id number]  Admit Date:  12/5/2023  Patient Care Team:  Brett Jackson MD as PCP - General (Internal Medicine)  Gerard Foreman MD as Referring Physician (Medical Oncology)  Darrell Reinoso MD as Consulting Physician (Hematology and Oncology)  Albin Barriga MD as Cardiologist (Cardiology)  Richard Aldana, RN as       Subjective   History Present Illness     No chief complaint on file.  History of Present Illness    Ms. Boyer is a 83 y.o. female with a history of myeloproliferative disorder, chronic hypoxic respiratory failure on 3 L, chronic diastolic CHF, pulmonary hypertension, paroxysmal atrial fibrillation, CAD with history of PCI, diabetes who presented to [facility] initially complaining of  nausea, vomiting, abdominal pain.  She was found to have MRSA bacteremia and was initiated on vancomycin.  No clear source of MRSA bacteremia was ever determined.  While hospitalized the patient had worsening respiratory failure and ultimately required Optiflow to maintain saturations.  Hospital course also complicated by atrial fibrillation with RVR/difficult to control rates.  Pulmonology, cardiology, oncology and infectious disease saw the patient in consultation. The patient's condition continued to deteriorate and after discussion with the patient and her children she has ultimately decided to transition to comfort care. Hospice has evaluated and she has transitioned to a scatterbed today.      Review of Systems   Reason unable to perform ROS: Asleep- patient not awakened due to comfort care status.        Personal History     Past Medical History:   Diagnosis Date    Atherosclerosis of abdominal aorta 02/05/2023    Atrial fibrillation     Breast cancer     CAD (coronary artery disease)     NSTEMI 2/2022: 90% ostial LAD, 99% D1, 70% mid-distal LAD (medical therapy). She received two stents (2.5x18 and 2.5x26mm Finesse LEFTY)  but I don't know which one went to which lesion.    Carotid atherosclerosis     Cholecystitis 11/22/2022    Added automatically from request for surgery [id number]    Chronic diastolic (congestive) heart failure     COVID 10/29/2022    GERD (gastroesophageal reflux disease)     Glaucoma     History of cataract     Hypertension     Microcytic anemia     per Dr. oleg pate office  note 6/30/22-dd    Myeloproliferative disorder     JAK2 positive    Nonbacterial thrombotic endocarditis     6/2021: 4x5mm vegetation on the ventricular surface of the anterior MV, negative blood cultures    Porcelain gallbladder     PUD (peptic ulcer disease)     TIA (transient ischemic attack)     Type 2 diabetes mellitus     Type 2 diabetes mellitus with circulatory disorder, without long-term current use of insulin 07/14/2022    Upper GI bleed      Past Surgical History:   Procedure Laterality Date    BREAST LUMPECTOMY  1999    BRONCHOSCOPY Bilateral 11/8/2023    Procedure: BRONCHOSCOPY UNDER FLUORO WITH BAL,  BIOPSIES; ACETYLCYSTEIN 4ML GIVEN;  Surgeon: Raoul Alford MD;  Location: [facility] ENDOSCOPY;  Service: Pulmonary;  Laterality: Bilateral;  PRE- PULMONARY INFILTRATES  POST- SAME    CARDIAC CATHETERIZATION N/A 09/02/2022    Procedure: Coronary angiography;  Surgeon: Guero Verde MD;  Location: [facility] CATH INVASIVE LOCATION;  Service: Cardiovascular;  Laterality: N/A;    CARDIAC CATHETERIZATION N/A 09/02/2022    Procedure: Stent LEFTY coronary;  Surgeon: Guero Verde MD;  Location: [facility] CATH INVASIVE LOCATION;  Service: Cardiovascular;  Laterality: N/A;    CARDIAC CATHETERIZATION N/A 11/29/2023    Procedure: Coronary angiography;  Surgeon: Shari De La Paz MD;  Location: [facility] CATH INVASIVE LOCATION;  Service: Cardiology;  Laterality: N/A;    CATARACT EXTRACTION  2011    CHOLECYSTECTOMY      CHOLECYSTECTOMY WITH INTRAOPERATIVE CHOLANGIOGRAM N/A 11/28/2022    Procedure: CHOLECYSTECTOMY LAPAROSCOPIC INTRAOPERATIVE  "CHOLANGIOGRAM;  Surgeon: Dani Grover Jr., MD;  Location: [facility] MAIN OR;  Service: General;  Laterality: N/A;    COLONOSCOPY N/A 2023    Procedure: COLONOSCOPY TO CECUM & T.I.;  Surgeon: Guero Ferris MD;  Location: [facility] ENDOSCOPY;  Service: Gastroenterology;  Laterality: N/A;  PRE- MELENA, GI BLEED  POST- DIVERTICULOSIS, INT HEMORRHOIDS    ENDOSCOPY N/A 2023    Procedure: ESOPHAGOGASTRODUODENOSCOPY WITH BIOPSIES;  Surgeon: Charles Diaz MD;  Location: [facility] ENDOSCOPY;  Service: Gastroenterology;  Laterality: N/A;  pre: abd pain, nausea  post: hiatal hernia, mild gastritis, sloughing of the esophagus    ENTEROSCOPY SMALL BOWEL N/A 2023    Procedure: ENTEROSCOPY SMALL BOWEL;  Surgeon: Guero Ferris MD;  Location: [facility] ENDOSCOPY;  Service: Gastroenterology;  Laterality: N/A;  PRE- MELENA, GI BLEED  POST- HIATAL HERNIA    JOINT REPLACEMENT      UPPER GASTROINTESTINAL ENDOSCOPY       Family History   Problem Relation Age of Onset    Ovarian cancer Mother     Lung cancer Father     Lung cancer Sister     Malig Hyperthermia Neg Hx      Social History     Tobacco Use    Smoking status: Former     Packs/day: 1.00     Years: 20.00     Additional pack years: 0.00     Total pack years: 20.00     Types: Cigarettes     Quit date: 1950     Years since quittin.0     Passive exposure: Past    Smokeless tobacco: Never    Tobacco comments:     30  years ago   Vaping Use    Vaping Use: Never used   Substance Use Topics    Alcohol use: Not Currently     Comment: \"rare\"    Drug use: Never     Current Facility-Administered Medications on File Prior to Encounter   Medication Dose Route Frequency Provider Last Rate Last Admin    [COMPLETED] predniSONE (DELTASONE) tablet 15 mg  15 mg Oral Daily With Breakfast Shari De La Paz MD   15 mg at 23 0930    [DISCONTINUED] acetaminophen (TYLENOL) 160 MG/5ML oral solution 650 mg  650 mg Oral Q4H PRN Chandni Luo MD        [DISCONTINUED] " acetaminophen (TYLENOL) suppository 650 mg  650 mg Rectal Q4H PRN Chandni Luo MD        [DISCONTINUED] acetaminophen (TYLENOL) tablet 650 mg  650 mg Oral Q4H PRN Shari De La Paz MD        [DISCONTINUED] acetaminophen (TYLENOL) tablet 650 mg  650 mg Oral Q4H PRN Chandni Luo MD        [DISCONTINUED] diphenoxylate-atropine (LOMOTIL) 2.5-0.025 MG per tablet 1 tablet  1 tablet Oral Q2H PRN Chandni Luo MD        [DISCONTINUED] Eucerin original healing lotion lotion   Topical Q12H Shari De La Paz MD   Given at 12/05/23 1438    [DISCONTINUED] Glycerin-Hypromellose- (ARTIFICIAL TEARS) 0.2-0.2-1 % ophthalmic solution solution 1 drop  1 drop Both Eyes Q30 Min PRChandni Bejarano MD        [DISCONTINUED] HYDROmorphone (DILAUDID) injection 0.5 mg  0.5 mg Intravenous Q2H PRN Chandni Luo MD        [DISCONTINUED] HYDROmorphone (DILAUDID) injection 1 mg  1 mg Intravenous Q2H PRN Chandni Luo MD        [DISCONTINUED] hydrOXYzine (ATARAX) tablet 25 mg  25 mg Oral TID PRN Liv Blank APRN   25 mg at 12/04/23 2349    [DISCONTINUED] LORazepam (ATIVAN) 2 MG/ML concentrated solution 0.5 mg  0.5 mg Sublingual Q1H PRN Chandni Luo MD   0.5 mg at 12/04/23 2019    [DISCONTINUED] LORazepam (ATIVAN) 2 MG/ML concentrated solution 1 mg  1 mg Sublingual Q1H PRChandni Bejarano MD        [DISCONTINUED] LORazepam (ATIVAN) 2 MG/ML concentrated solution 2 mg  2 mg Sublingual Q1H PRChandni Bejarano MD        [DISCONTINUED] LORazepam (ATIVAN) injection 0.5 mg  0.5 mg Intravenous Q1H PRChandni Bejarano MD        [DISCONTINUED] LORazepam (ATIVAN) injection 0.5 mg  0.5 mg Subcutaneous Q1H PRChandni Bejarano MD        [DISCONTINUED] LORazepam (ATIVAN) injection 1 mg  1 mg Intravenous Q1H PRN Chandni Luo MD        [DISCONTINUED] LORazepam (ATIVAN) injection 1 mg  1 mg Subcutaneous Q1H PRN Chandni Luo MD        [DISCONTINUED] LORazepam (ATIVAN) injection 2  mg  2 mg Intravenous Q1H PRN Chandni Luo MD        [DISCONTINUED] LORazepam (ATIVAN) injection 2 mg  2 mg Subcutaneous Q1H PRN Chandni Luo MD        [DISCONTINUED] LORazepam (ATIVAN) tablet 0.25 mg  0.25 mg Oral Q6H PRN Chandni Luo MD   0.25 mg at 12/03/23 2155    [DISCONTINUED] Menthol-Zinc Oxide 1 application   1 application  Topical Q12H Shari De La Paz MD   1 application  at 12/05/23 1438    [DISCONTINUED] morphine concentrated solution 10 mg  10 mg Sublingual Q3H PRN Chandni Luo MD        [DISCONTINUED] morphine concentrated solution 5 mg  5 mg Sublingual Q3H PRN Chandni Luo MD        [DISCONTINUED] morphine injection 2 mg  2 mg Intravenous Q2H PRN Shari De La Paz MD   2 mg at 12/05/23 1738    [DISCONTINUED] morphine injection 2 mg  2 mg Intravenous Q1H PRN Chandni Luo MD        [DISCONTINUED] morphine injection 4 mg  4 mg Intravenous Q1H PRN Chandni Luo MD        [DISCONTINUED] ondansetron (ZOFRAN) injection 4 mg  4 mg Intravenous Q6H PRN Shari De La Paz MD   4 mg at 11/26/23 1514    [DISCONTINUED] ondansetron (ZOFRAN) tablet 4 mg  4 mg Oral Q6H PRN Shari De La Paz MD        [DISCONTINUED] predniSONE (DELTASONE) tablet 10 mg  10 mg Oral Daily With Breakfast Shari De La Paz MD        [DISCONTINUED] predniSONE (DELTASONE) tablet 5 mg  5 mg Oral Daily With Breakfast Shari De La Paz MD        [DISCONTINUED] sodium chloride 0.9 % flush 10 mL  10 mL Intravenous PRN Shari De La Paz MD   10 mL at 11/25/23 0840    [DISCONTINUED] sodium chloride 0.9 % flush 10 mL  10 mL Intravenous Q12H Danisha Reinoso MD   10 mL at 12/05/23 0900    [DISCONTINUED] sodium chloride 0.9 % flush 10 mL  10 mL Intravenous PRN Danisha Reinoso MD        [DISCONTINUED] sodium chloride 0.9 % infusion 40 mL  40 mL Intravenous PRN Danisha Reinoso MD         Current Outpatient Medications on File Prior to Encounter   Medication Sig Dispense Refill    acetaminophen (TYLENOL) 500 MG tablet Take  1 tablet by mouth As Needed.      apixaban (ELIQUIS) 5 MG tablet tablet Take 1 tablet by mouth 2 (Two) Times a Day. 180 tablet 3    aspirin 81 MG EC tablet Take 1 tablet by mouth Daily.      bisacodyl (DULCOLAX) 10 MG suppository Insert 1 suppository into the rectum Daily As Needed for Constipation (Use if bisacodyl oral is ineffective).      budesonide-formoterol (SYMBICORT) 160-4.5 MCG/ACT inhaler Inhale 2 puffs 2 (Two) Times a Day.      cadexomer iodine (IODOSORB) 0.9 % gel Apply 1 application  topically to the appropriate area as directed Daily.      famotidine (PEPCID) 20 MG tablet Take 1 tablet by mouth Daily.      furosemide (LASIX) 20 MG tablet Take 0.5 tablets by mouth Daily.      latanoprost (XALATAN) 0.005 % ophthalmic solution Administer 1 drop to both eyes.      metoprolol succinate XL (TOPROL-XL) 25 MG 24 hr tablet Take 1 tablet by mouth Daily. 90 tablet 2    Momelotinib Dihydrochloride (OJJAARA) 200 MG tablet Take 1 tablet by mouth Daily. 28 tablet 3    polyethylene glycol (MIRALAX) 17 g packet Take 17 g by mouth Daily As Needed (Use if senna-docusate is ineffective).      predniSONE (DELTASONE) 5 MG tablet Take 4 tablets by mouth Daily With Breakfast for 6 days, THEN 3 tablets Daily With Breakfast for 7 days, THEN 2 tablets Daily With Breakfast for 7 days, THEN 1 tablet Daily With Breakfast for 7 days. 66 tablet 0    saccharomyces boulardii (FLORASTOR) 250 MG capsule Take 1 capsule by mouth 2 (Two) Times a Day. 60 capsule 0    sertraline (ZOLOFT) 100 MG tablet Take 1 tablet by mouth Daily.      simethicone (MYLICON) 80 MG chewable tablet Chew 1 tablet by mouth 4 (Four) Times a Day As Needed for Flatulence. 100 tablet 0    tiotropium bromide monohydrate (SPIRIVA RESPIMAT) 2.5 MCG/ACT aerosol solution inhaler Inhale 2 puffs Daily.       Allergies   Allergen Reactions    Aspirin Unknown - Low Severity    Diphenhydramine Hcl Anxiety     Benadryl IVP    Hydroxyurea Other (See Comments)     Her hemoglobin  drop and was stop in February 2022 and start taking Jakafi    Oxycodone Unknown - Low Severity       Objective    Objective     Vital Signs  Temp:  [96.8 °F (36 °C)-98.3 °F (36.8 °C)] 98.3 °F (36.8 °C)  Heart Rate:  [101-109] 103  Resp:  [18-20] 20  BP: (107-131)/(60-64) 107/60  SpO2:  [78 %-96 %] 93 %  on  Flow (L/min):  [40] 40;   Device (Oxygen Therapy): heated;high-flow nasal cannula;humidified  There is no height or weight on file to calculate BMI.    Physical Exam  Constitutional:       General: She is not in acute distress.     Appearance: She is ill-appearing and toxic-appearing.   HENT:      Head: Normocephalic and atraumatic.   Cardiovascular:      Rate and Rhythm: Normal rate and regular rhythm.   Pulmonary:      Effort: No respiratory distress.      Comments: Diminished  Abdominal:      General: There is no distension.      Palpations: Abdomen is soft.      Tenderness: There is no abdominal tenderness.   Skin:     Coloration: Skin is pale.         Lab Results (last 24 hours)       ** No results found for the last 24 hours. **            Imaging Results (Last 24 Hours)       ** No results found for the last 24 hours. **            Results for orders placed during the hospital encounter of 11/23/23    Adult Transthoracic Echo Complete W/ Cont if Necessary Per Protocol    Interpretation Summary    Left ventricular systolic function is normal. Calculated left ventricular EF = 55.3% Normal left ventricular cavity size noted. Left ventricular wall thickness is consistent with moderate concentric hypertrophy. All left ventricular wall segments contract normally. Left ventricular diastolic function is consistent with (grade II w/high LAP) pseudonormalization.    The left atrial cavity is moderately dilated. Left atrial volume is underestimated  The right atrial cavity is moderately dilated.    The aortic valve is abnormal in structure. There is severe thickening of the non-coronary cusp(s) of the aortic valve.  There is a mobile undulating mass on what appears to be the ventricular surface of the aortic valve though a clear attachment is not visualized. It is highly suspicious for vegetation. Cannot determine if this is a bileaflet or trileaflet valve Trace aortic valve regurgitation is present. Mild aortic valve stenosis is present.    There is moderate, bileaflet mitral valve thickening present. Mild to moderate mitral valve regurgitation is present. Mild mitral valve stenosis is present. The mean mitral valve gradient is 5 mm Hg at a heart rate of 97 bpm    Trace tricuspid valve regurgitation is present. Estimated right ventricular systolic pressure from tricuspid regurgitation is moderately elevated (45-55 mmHg). Calculated right ventricular systolic pressure from tricuspid regurgitation is 45 mmHg.    Discussed with patient's nurse on floor who will contact primary team for cardiology consult      No orders to display        Assessment/Plan     Active Hospital Problems    Diagnosis  POA    **Acute respiratory failure with hypoxia [J96.01]  Yes    Atrial fibrillation with RVR [I48.91]  Yes    MRSA bacteremia [R78.81, B95.62]  Yes    Myelofibrosis [D75.81]  Yes    Personal history of transient ischemic attack (TIA), 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 deficits [Z86.73]  Not Applicable    Hypertension [I10]  Yes    MDS (myelodysplastic syndrome) [D46.9]  Yes    Acute on chronic diastolic heart failure [I50.33]  Yes      Resolved Hospital Problems   No resolved problems to display.       Ms. Boyer is a 83 y.o. with a history of myeloproliferative disorder, chronic hypoxic respiratory failure on 3 L, chronic diastolic CHF, pulmonary hypertension, paroxysmal atrial fibrillation, CAD with history of PCI, diabetes who initially presented for nausea, vomiting, abdominal pain. She was found to have MRSA bacteremia. The patient has transitioned to comfort care and is transferring to Hospice Scatterbed.     - Plan for  patient to wean off of Optiflow, morphine available for air hunger, ativan available for anxiety.   - D/w RN, patient has intermittently been removing her Optiflow- no distress noted when optiflow is removed.   - Patient has many family members and her goal is to be able to be comfortable and spend time conversing with her family.   - Whyte placed for comfort per family/patient request.     Code Status - Comfort care.       Chandni Luo MD  [facility]  12/05/23  18:10 EST

## 2023-12-05 NOTE — DISCHARGE SUMMARY
Patient Name: Catherine Boyer  : 1940  MRN: [id number]    Date of Admission: 2023  Date of Discharge:  2023  Primary Care Physician: Brett Jackson MD      Chief Complaint:   Vomiting, Nausea, and Abdominal Pain      Discharge Diagnoses     Active Hospital Problems    Diagnosis  POA    **Acute respiratory failure with hypoxia [J96.01]  Unknown    Atrial fibrillation with RVR [I48.91]  Unknown    MRSA bacteremia [R78.81, B95.62]  Yes    CAP (community acquired pneumonia) [J18.9]  Yes    Personal history of transient ischemic attack (TIA), 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        Hospital Course     Ms. Boyer is a 83 y.o. female with a history of myeloproliferative disorder, chronic hypoxic respiratory failure on 3 L, chronic diastolic CHF, pulmonary hypertension, paroxysmal atrial fibrillation, CAD with history of PCI, diabetes who presented to [facility] initially complaining of  nausea, vomiting, abdominal pain.  Please see the admitting history and physical for further details.  She was found to have MRSA bacteremia and was initiated on vancomycin.  No clear source of MRSA bacteremia was ever determined.  While hospitalized the patient had worsening respiratory failure and ultimately required Optiflow to maintain saturations.  Hospital course also complicated by atrial fibrillation with RVR/difficult to control rates.  Pulmonology, cardiology, oncology and infectious disease saw  the patient in consultation. The patient's condition continued to deteriorate and after discussion with the patient and her children she has ultimately decided to transition to comfort care. Hospice has evaluated and she will transition to a scatterbed today.   Consultants     Consult Orders (all) (From admission, onward)       Start     Ordered    12/04/23 0752  Inpatient Hospice / [facility] Consult  Once        Specialty:  Hospice and Palliative Medicine  Provider:  (Not yet assigned)    12/04/23 0751    12/03/23 1209  Inpatient Palliative Care Team Consult  Once        Provider:  (Not yet assigned)    12/03/23 1209    11/29/23 1011  Inpatient Pulmonology Consult  Once        Specialty:  Pulmonary Disease  Provider:  Gualberto Luther Jr., MD    11/29/23 1010    11/29/23 0832  Inpatient Cardiology Consult  Once        Specialty:  Cardiology  Provider:  Albin Barriga MD    11/29/23 0832    11/28/23 1516  IV TEAM - Consult for PICC Line (IV Team to Determine Number of Lumens)  Once        Provider:  (Not yet assigned)    11/28/23 1515    11/27/23 0815  Inpatient Cardiology Consult  Once        Specialty:  Cardiology  Provider:  Eve Anderson MD    11/27/23 0814    11/25/23 1213  Inpatient Access Center Consult  Once        Provider:  (Not yet assigned)    11/25/23 1212    11/25/23 0857  Inpatient Infectious Diseases Consult  Once        Specialty:  Infectious Diseases  Provider:  Mack Vaca MD    11/25/23 0856    11/24/23 1007  Hematology & Oncology Inpatient Consult  Once        Specialty:  Hematology and Oncology  Provider:  Darrell Reinoso MD    11/24/23 1010    11/24/23 0556  Inpatient Case Management  Consult  Once        Provider:  (Not yet assigned)    11/24/23 0556    11/24/23 0159  [facility] (on-call MD unless specified) Details  Once        Specialty:  Hospitalist  Provider:  (Not yet assigned)    11/24/23 0158    Signed and Held  [facility] (on-call MD unless specified) Details  Once         Specialty:  Hospitalist  Provider:  (Not yet assigned)    Signed and Held                  Procedures     Imaging Results (All)       Procedure Component Value Units Date/Time    XR Chest 1 View [[id number]] Collected: 12/02/23 1338     Updated: 12/02/23 1348    Narrative:      PORTABLE CHEST X-RAY     HISTORY: Follow-up pulmonary infiltrates.     TECHNIQUE: Portable chest x-ray is correlated with multiple recent prior  exams.     FINDINGS: There are clips from cholecystectomy and advanced arthritic  change of both shoulders.     The cardiomediastinal contours appear normal. There appears to be some  pleural fluid in the lower left hemithorax. Opacity in the lungs  bilaterally with a perihilar configuration is again observed and does  not appear significantly changed in comparison with the 11/30/2023 exam.       Impression:      Bilateral pulmonary opacities appear similar as on recent  x-rays. There may be a small left pleural effusion. No new abnormality.     This report was finalized on 12/2/2023 1:45 PM by Dr. Charles Tirado M.D on Workstation: [id number]       XR Chest 1 View [[id number]] Collected: 11/30/23 1332     Updated: 11/30/23 1338    Narrative:      XR CHEST 1 VW-     HISTORY: Female who is 83 years-old, dyspnea     TECHNIQUE: Frontal view of the chest     COMPARISON: 11/29/2023     FINDINGS: The heart size is borderline. Extensive bilateral infiltrates  appear similar to prior exam. There may be mild left pleural effusion.  No pneumothorax. No acute osseous process.       Impression:      Extensive bilateral infiltrates appear similar to prior  exam.           This report was finalized on 11/30/2023 1:35 PM by Dr. Reilly Ritter M.D on Workstation: [id number]       XR Chest 1 View [[id number]] Collected: 11/29/23 0913     Updated: 11/29/23 0923    Narrative:      PORTABLE CHEST X-RAY     HISTORY: Chest pain.     TECHNIQUE: Portable chest x-ray is provided and correlated with recent  prior exams.      FINDINGS: The cardiac silhouette appears enlarged but unchanged. Fairly  dense abnormal parenchymal opacity in the lungs bilaterally has a  symmetrical, perihilar distribution. This appears more dense and more  extensive than on CT angiogram chest 11/24/2023 or chest x-ray  11/20/2023. No pneumothorax.       Impression:      Interval worsening in pulmonary parenchymal opacity in the  perihilar distribution which may represent progressive pulmonary edema  or pneumonia.     This report was finalized on 11/29/2023 9:20 AM by Dr. Charles Tirado M.D on Workstation: [id number]       CT Angiogram Abdomen Pelvis [[id number]] Collected: 11/24/23 0125     Updated: 11/24/23 0125    Narrative:        Patient: MARIBELL RUGGIERO  Time Out: 01:24  Exam(s): CTA ABDOMEN + PELVIS     EXAM:    CT Angiography Abdomen and Pelvis With Intravenous Contrast    CLINICAL HISTORY:       Concern for acute aortic syndrome.    TECHNIQUE:    Axial computed tomographic angiography images of the abdomen and pelvis   with intravenous contrast.  CTDI is 37 mGy and DLP is 934 mGy-cm.  This   CT exam was performed according to the principle of ALARA (As Low As   Reasonably Achievable) by using one or more of the following dose   reduction techniques: automated exposure control, adjustment of the mA   and or kV according to patient size, and or use of iterative   reconstruction technique.    MIP reconstructed images were created and reviewed.    COMPARISON:    No relevant prior studies available.    FINDINGS:     VASCULATURE:    Aorta:  No acute findings.  No aortic aneurysm or dissection.    Celiac trunk and mesenteric arteries:  Mild atherosclerosis of the   origin of the celiac trunk, SMA and BETZY without significant stenosis.    Renal arteries:  No acute findings.  No occlusion or significant   stenosis.    Iliac arteries:  Moderate atherosclerosis of the bilateral common iliac   arteries, external iliac arteries and internal iliac arteries.  No    occlusion or significant stenosis.      Lung bases:  Unremarkable.  No mass.  No consolidation.     ABDOMEN:    Liver:  Nodular contour of the liver is concerning for cirrhosis.    Gallbladder and bile ducts:  Cholecystectomy.  No ductal dilation.    Pancreas:  Unremarkable.  No ductal dilation.  No mass.    Spleen:  Splenomegaly.  The spleen is heterogeneous which could   represent a perfusion anomaly.    Adrenals:  Unremarkable.  No mass.    Kidneys and ureters:  Unremarkable.  No hydronephrosis.  No solid mass.    Stomach and bowel:  Diverticulosis.  No obstruction.  No mucosal   thickening.     PELVIS:    Appendix:  No findings to suggest acute appendicitis.    Bladder:  Unremarkable.  No mass.    Reproductive:  Unremarkable as visualized.     ABDOMEN and PELVIS:    Intraperitoneal space:  Unremarkable.  No significant fluid collection.    No free air.    Bones joints:  There are degenerative changes of the spine.  No acute   fracture.  No dislocation.    Soft tissues:  Unremarkable.    Lymph nodes:  Unremarkable.  No enlarged lymph nodes.    IMPRESSION:       1.  No aortic aneurysm or dissection.  2.  Nodular contour of the liver is concerning for cirrhosis.  3.  The spleen is heterogeneous which could represent a perfusion anomaly.    4.  Splenomegaly is nonspecific but concerning for portal hypertension.  5.  Diverticulosis.      Impression:          Electronically signed by Yuki Starr MD on 11-24-23 at 0124    CT Angiogram Chest [[id number]] Collected: 11/24/23 0122     Updated: 11/24/23 0122    Narrative:        Patient: MARIBELL RUGGIERO  Time Out: 01:22  Exam(s): CTA CHEST     EXAM:    CT Angiography Chest With Intravenous Contrast    CLINICAL HISTORY:       Concern for acute aortic syndrome.    TECHNIQUE:    Axial computed tomographic angiography images of the chest with   intravenous contrast.  CTDI is 37 mGy and DLP is 934 mGy-cm.  This CT   exam was performed according to the principle of HESHAM (As  Low As   Reasonably Achievable) by using one or more of the following dose   reduction techniques: automated exposure control, adjustment of the mA   and or kV according to patient size, and or use of iterative   reconstruction technique.    MIP reconstructed images were created and reviewed.    COMPARISON:    No relevant prior studies available.    FINDINGS:    Pulmonary arteries:  Unremarkable.  No pulmonary embolus.    Aorta:  Mild atherosclerosis.  No aortic aneurysm or dissection.    Lungs:  Diffuse bilateral airspace opacities are concerning for   multifocal pneumonia and or aspiration.  Cannot exclude superimposed   pulmonary edema.    Pleural space:  Unremarkable.  No significant effusion.  No   pneumothorax.    Heart:  Unremarkable.  No cardiomegaly.  No significant pericardial   effusion.  No evidence of RV dysfunction.    Bones joints:  There are degenerative changes of the spine.  No acute   fracture.    Soft tissues:  Unremarkable.    Lymph nodes:  Unremarkable.  No enlarged lymph nodes.    IMPRESSION:       1.  No aortic aneurysm or dissection.  2.  No pulmonary embolus.  3.  Diffuse bilateral airspace opacities are concerning for multifocal   pneumonia and or aspiration.  Cannot exclude superimposed pulmonary edema.      Impression:          Electronically signed by Yuki Starr MD on 11-24-23 at 0122    XR Chest 1 View [[id number]] Collected: 11/24/23 0100     Updated: 11/24/23 0100    Narrative:        Patient: MARIBELL RUGGIERO  Time Out: 01:00  Exam(s): XR CXR 1 VIEW     EXAM:    XR Chest, 1 View    CLINICAL HISTORY:       Nausea and upper abd pain.    TECHNIQUE:    Frontal view of the chest.    COMPARISON:    Chest radiograph 11 20 2023    FINDINGS:    Lungs:  Significantly worsened bilateral airspace opacities.    Pleural space:  New small left pleural effusion.  No pneumothorax.    Heart:  Unremarkable.  No cardiomegaly.    Mediastinum:  Unremarkable.  Normal mediastinal contour.    Bones  "joints:  There are degenerative changes of the spine.  No acute   fracture.    IMPRESSION:       1.  Significantly worsened bilateral airspace opacities.  2.  New small left pleural effusion.      Impression:          Electronically signed by Yuki Starr MD on 11-24-23 at 0100            Pertinent Labs     Results from last 7 days   Lab Units 12/03/23 0433 12/02/23  0508 12/01/23  0819 11/30/23  0421   WBC 10*3/mm3 97.89* 81.65* 85.16* 78.89*   HEMOGLOBIN g/dL 10.7* 9.5* 10.6* 8.4*   PLATELETS 10*3/mm3 381 299 301 285     Results from last 7 days   Lab Units 12/03/23 0433 12/02/23  0508 12/01/23 0819 11/30/23  0421   SODIUM mmol/L 133* 136 137 139   POTASSIUM mmol/L 4.1 4.5 3.0* 4.0   CHLORIDE mmol/L 89* 89* 88* 96*   CO2 mmol/L 27.4 29.0 29.6* 24.9   BUN mg/dL 22 19 14 21   CREATININE mg/dL 0.99 0.91 0.91 0.93   GLUCOSE mg/dL 78 84 115* 202*   Estimated Creatinine Clearance: 36.3 mL/min (by C-G formula based on SCr of 0.99 mg/dL).  Results from last 7 days   Lab Units 11/30/23  0421   ALBUMIN g/dL 3.0*   BILIRUBIN mg/dL 0.9   ALK PHOS U/L 155*   AST (SGOT) U/L 15   ALT (SGPT) U/L 12     Results from last 7 days   Lab Units 12/03/23 0433 12/02/23  0508 12/01/23 0819 11/30/23  0421   CALCIUM mg/dL 8.7 8.3* 8.1* 7.6*   ALBUMIN g/dL  --   --   --  3.0*   MAGNESIUM mg/dL  --   --  2.2 1.7       Results from last 7 days   Lab Units 11/30/23  0421 11/29/23  1108 11/29/23  0745 11/29/23  0427   HSTROP T ng/L 27* 29* 32* 27*   PROBNP pg/mL  --   --  8,066.0*  --            Invalid input(s): \"LDLCALC\"        Test Results Pending at Discharge       Discharge Details        Discharge Medications        ASK your doctor about these medications        Instructions Start Date   acetaminophen 500 MG tablet  Commonly known as: TYLENOL   500 mg, Oral, As Needed      apixaban 5 MG tablet tablet  Commonly known as: ELIQUIS   5 mg, Oral, 2 Times Daily      aspirin 81 MG EC tablet   81 mg, Oral, Daily      bisacodyl 10 MG " suppository  Commonly known as: DULCOLAX   10 mg, Rectal, Daily PRN      budesonide-formoterol 160-4.5 MCG/ACT inhaler  Commonly known as: SYMBICORT   2 puffs, Inhalation, 2 Times Daily - RT      cadexomer iodine 0.9 % gel  Commonly known as: IODOSORB   1 application , Topical, Daily      famotidine 20 MG tablet  Commonly known as: PEPCID   20 mg, Oral, Daily      furosemide 20 MG tablet  Commonly known as: LASIX   10 mg, Oral, Daily      Gas Relief 80 MG chewable tablet  Generic drug: simethicone   Chew 1 tablet by mouth 4 (Four) Times a Day As Needed for Flatulence.      latanoprost 0.005 % ophthalmic solution  Commonly known as: XALATAN   1 drop, Both Eyes      metoprolol succinate XL 25 MG 24 hr tablet  Commonly known as: TOPROL-XL   25 mg, Oral, Daily      Momelotinib Dihydrochloride 200 MG tablet  Commonly known as: OJJAARA   200 mg, Oral, Daily      polyethylene glycol 17 g packet  Commonly known as: MIRALAX   17 g, Oral, Daily PRN      predniSONE 5 MG tablet  Commonly known as: DELTASONE   Take 4 tablets by mouth Daily With Breakfast for 6 days, THEN 3 tablets Daily With Breakfast for 7 days, THEN 2 tablets Daily With Breakfast for 7 days, THEN 1 tablet Daily With Breakfast for 7 days.   Start Date: November 23, 2023     saccharomyces boulardii 250 MG capsule  Commonly known as: FLORASTOR   250 mg, Oral, 2 Times Daily      sertraline 100 MG tablet  Commonly known as: ZOLOFT   100 mg, Oral, Daily      tiotropium bromide monohydrate 2.5 MCG/ACT aerosol solution inhaler  Commonly known as: SPIRIVA RESPIMAT   2 puffs, Inhalation, Daily - RT               Allergies   Allergen Reactions    Aspirin Unknown - Low Severity    Diphenhydramine Hcl Anxiety     Benadryl IVP    Hydroxyurea Other (See Comments)     Her hemoglobin drop and was stop in February 2022 and start taking Jakafi    Oxycodone Unknown - Low Severity         Discharge Disposition:  Hospice/Medical Facility ([facility])    Discharge  Diet:  Diet Order   Procedures    Diet: Regular/House Diet; Texture: Regular Texture (IDDSI 7); Fluid Consistency: Thin (IDDSI 0)       Discharge Activity:       CODE STATUS:    Code Status and Medical Interventions:   Ordered at: 12/03/23 1346     Code Status (Patient has no pulse and is not breathing):    No CPR (Do Not Attempt to Resuscitate)     Medical Interventions (Patient has pulse or is breathing):    Comfort Measures       Future Appointments   Date Time Provider Department Center   12/20/2023  9:40 AM LAB CHAIR 1 [facility]  [facility]   12/20/2023 10:00 AM Darrell Reinoso MD [facility]   1/3/2024 10:00 AM Danisha Reinoso MD [facility] ID [facility]   4/18/2024  1:45 PM Albin Barriga MD [facility] CD [facility]      Follow-up Information       Brett Jackson MD .    Specialty: Internal Medicine  Contact information:  [address] [facility] [address]  [phone]                             Time Spent on Discharge:  I spent greater than 30 minutes on this discharge activity which included: face-to-face encounter with the patient, reviewing the data in the system, coordination of the care with the nursing staff as well as consultants, documentation, and entering orders.       Chandni Luo MD  [facility]  12/05/23  17:27 EST

## 2023-12-05 NOTE — PROGRESS NOTES
Name: Catherine Boyer ADMIT: 2023   : 1940  PCP: Brett Jackson MD    MRN: [id number] LOS: 11 days   AGE/SEX: 83 y.o. female  ROOM: [facility]     Subjective   Subjective   Patient laying in bed asleep, her daughter is at bedside also asleep.  Discussed with nursing staff, no issues overnight. Hospice to see family again later today.       Objective   Objective   Vital Signs  Temp:  [96.8 °F (36 °C)-98 °F (36.7 °C)] 98 °F (36.7 °C)  Heart Rate:  [101-109] 101  Resp:  [18] 18  BP: (121-131)/(62-64) 131/62  SpO2:  [78 %-96 %] 78 %  on  Flow (L/min):  [40] 40;   Device (Oxygen Therapy): heated;high-flow nasal cannula;humidified  Body mass index is 25.42 kg/m².  Physical Exam  Constitutional:       General: She is in acute distress.      Appearance: She is ill-appearing and toxic-appearing.   HENT:      Head: Normocephalic and atraumatic.   Cardiovascular:      Rate and Rhythm: Normal rate and regular rhythm.   Pulmonary:      Effort: Respiratory distress (slight, decreased bs at bases) present.   Abdominal:      General: There is no distension.      Palpations: Abdomen is soft.      Tenderness: There is no abdominal tenderness.   Skin:     Coloration: Skin is pale.         Results Review     I reviewed the patient's new clinical results.  Results from last 7 days   Lab Units 23  0421   WBC 10*3/mm3 97.89* 81.65* 85.16* 78.89*   HEMOGLOBIN g/dL 10.7* 9.5* 10.6* 8.4*   PLATELETS 10*3/mm3 381 299 301 285     Results from last 7 days   Lab Units 23  05023  0421   SODIUM mmol/L 133* 136 137 139   POTASSIUM mmol/L 4.1 4.5 3.0* 4.0   CHLORIDE mmol/L 89* 89* 88* 96*   CO2 mmol/L 27.4 29.0 29.6* 24.9   BUN mg/dL 22 19 14 21   CREATININE mg/dL 0.99 0.91 0.91 0.93   GLUCOSE mg/dL 78 84 115* 202*   Estimated Creatinine Clearance: 36.3 mL/min (by C-G formula based on SCr of 0.99 mg/dL).  Results from last 7 days   Lab Units  "11/30/23  0421   ALBUMIN g/dL 3.0*   BILIRUBIN mg/dL 0.9   ALK PHOS U/L 155*   AST (SGOT) U/L 15   ALT (SGPT) U/L 12     Results from last 7 days   Lab Units 12/03/23  0433 12/02/23  0508 12/01/23  0819 11/30/23  0421   CALCIUM mg/dL 8.7 8.3* 8.1* 7.6*   ALBUMIN g/dL  --   --   --  3.0*   MAGNESIUM mg/dL  --   --  2.2 1.7       COVID19   Date Value Ref Range Status   11/24/2023 Not Detected Not Detected - Ref. Range Final   11/08/2023 Not Detected Not Detected - Ref. Range Final     No results found for: \"HGBA1C\", \"POCGLU\"    XR Chest 1 View  Narrative: PORTABLE CHEST X-RAY     HISTORY: Follow-up pulmonary infiltrates.     TECHNIQUE: Portable chest x-ray is correlated with multiple recent prior  exams.     FINDINGS: There are clips from cholecystectomy and advanced arthritic  change of both shoulders.     The cardiomediastinal contours appear normal. There appears to be some  pleural fluid in the lower left hemithorax. Opacity in the lungs  bilaterally with a perihilar configuration is again observed and does  not appear significantly changed in comparison with the 11/30/2023 exam.     Impression: Bilateral pulmonary opacities appear similar as on recent  x-rays. There may be a small left pleural effusion. No new abnormality.     This report was finalized on 12/2/2023 1:45 PM by Dr. Charles Tirado M.D on Workstation: [id number]       Scheduled Medications  Eucerin original healing lotion, , Topical, Q12H  Menthol-Zinc Oxide, 1 application , Topical, Q12H  [START ON 12/6/2023] predniSONE, 10 mg, Oral, Daily With Breakfast   Followed by  [START ON 12/13/2023] predniSONE, 5 mg, Oral, Daily With Breakfast  sodium chloride, 10 mL, Intravenous, Q12H    Infusions   Diet  Diet: Regular/House Diet; Texture: Regular Texture (IDDSI 7); Fluid Consistency: Thin (IDDSI 0)           Assessment/Plan     Active Hospital Problems    Diagnosis  POA    **MRSA bacteremia [R78.81, B95.62]  Yes    CAP (community acquired pneumonia) " [J18.9]  Yes    Myeloproliferative disorder [D47.1]  Yes    Type 2 diabetes mellitus with circulatory disorder, 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       83-year-old woman with a history of myeloproliferative disorder, chronic hypoxic respiratory failure on 3 L, chronic diastolic CHF, pulmonary hypertension, paroxysmal atrial fibrillation, CAD with history of PCI, diabetes who presented w/ n/v, abdominal pain.  She was diagnosed with MRSA bacteremia, after prolonged hospitalization she/her family have decided to transition to comfort care.      Discussed with patient and family previously, patient's goal is to be awake/alert enough to visit with her many family members who have come to the visit.  She has significant anxiety and this was discussed with her nurse, palliative order set with IV pain and anxiety medications have been ordered.  Patient's family is concerned/does not want the patient to feel short of breath, discussed using morphine for air hunger.  Hospice to see patient later today. Discussed with RN, patient taking her OptiFlow off periodically and has been tolerating.     MRSA bacteremia  Acute on chronic hypoxic respiratory failure  Acute on chronic diastolic CHF exacerbation  Pulmonary HTN by echo  PAF/A fib with RVR  CAD w/ H/o PCI- cath 11/29 showed stent patency  Sepsis secondary to MRSA bacteremia   Lactic Acidosis   Myelodysplastic syndrome   Lung infiltrates  DM    CODE STATUS: Comfort care  Discussed with nursing staff.    Chandni Luo MD  [facility]  12/05/23  12:53 EST    I wore protective equipment throughout this patient encounter including gloves.  Hand hygiene was performed before donning protective equipment and after removal when leaving the room.    Expected Discharge Date and  Time       Expected Discharge Date Expected Discharge Time    Dec 4, 2023              Copied text in this note has been reviewed and is accurate as of 12/05/23.

## 2023-12-05 NOTE — CONSULTS
[facility] admit 12/5/23  [facility] ID [id number]    Primary diagnosis: respiratory failure J96.01    Patient is needing symptom management for pain and SOA.     Met with pt and family at bedside and provided explanation of services. Goal is comfort and to wean off high flow o2 to nasal cannula. They are also requesting Whyte for comfort. Pt is alert and complaining of pain to groin area. Consents signed and written material provided.    Thank you for the referral.    Keri Keller RN  [facility]  [phone]

## 2023-12-06 PROBLEM — E11.65 TYPE 2 DIABETES MELLITUS WITH HYPERGLYCEMIA: Status: ACTIVE | Noted: 2023-01-01

## 2023-12-06 PROBLEM — I27.20 PULMONARY HYPERTENSION: Status: ACTIVE | Noted: 2023-01-01

## 2023-12-06 PROBLEM — I05.0 MITRAL VALVE STENOSIS, MILD: Status: ACTIVE | Noted: 2023-01-01

## 2023-12-06 PROBLEM — G31.9 CEREBRAL ATROPHY: Status: ACTIVE | Noted: 2023-01-01

## 2023-12-06 PROBLEM — K74.69 OTHER CIRRHOSIS OF LIVER: Status: ACTIVE | Noted: 2023-01-01

## 2023-12-06 PROBLEM — I35.0 AORTIC VALVE STENOSIS, MILD: Status: ACTIVE | Noted: 2023-01-01

## 2023-12-06 PROBLEM — I67.9 CEREBROVASCULAR SMALL VESSEL DISEASE: Status: ACTIVE | Noted: 2023-01-01

## 2023-12-06 PROBLEM — I33.0 AORTIC VALVE VEGETATION: Status: ACTIVE | Noted: 2023-01-01

## 2023-12-06 PROBLEM — I34.0 MODERATE MITRAL VALVE REGURGITATION: Status: ACTIVE | Noted: 2023-01-01

## 2023-12-06 PROBLEM — R16.1 SPLENOMEGALY: Status: ACTIVE | Noted: 2023-01-01

## 2023-12-06 PROBLEM — J96.21 ACUTE ON CHRONIC RESPIRATORY FAILURE WITH HYPOXIA: Status: ACTIVE | Noted: 2023-01-01

## 2023-12-06 PROBLEM — Z51.5 HOSPICE CARE PATIENT: Status: ACTIVE | Noted: 2023-01-01

## 2023-12-06 LAB
FUNGUS WND CULT: NORMAL
MYCOBACTERIUM SPEC CULT: NORMAL
NIGHT BLUE STAIN TISS: NORMAL

## 2023-12-06 NOTE — PROGRESS NOTES
"      [facility]    Catherine Boyer  [id number]  12/6/2023    Admission R/T [facility] Dx: yes    [facility]: Acute respiratory failure with hypoxia    Review of Visit: Patient is an 84yo female with a primary [facility] diagnosis of acute respiratory failure with hypoxia. Admitted to [facility] for GIP for EOL care and symptom management of pain, dyspnea and anxiety. Patient is currently in contact isolation for positive blood culture on 11/25, MRSA. [facility] RN donned appropriate garb prior to entering pts room and maintained throughout the visit.    PPS: 20%    VS: 97, 103, 18, 130/63, 95% on 6L via HF NC    Medications in 24 hours:  -IV Morphine 2mg x4  -SL Ativan 0.5mg x2    Recommendations:  Continue to monitor for signs of decline and provide comfort measures. Contact [facility] at [phone] with any questions or concerns and at TOD.     Assessment:  Patient is bed bound, initially awake and conversing but fell asleep several times during visit, intake varies, PPS 20%. Respirations are even and unlabored, lung sounds diminished throughout, remains on 6L via HF NC, weaned from 40L Optiflow in the last 24 hours. Abdomen is soft with hypoactive bowel sounds. Extremities are warm to touch, scattered bruising. Per documentation in Epic, multiple wounds to the BLE. Whyte catheter to bedside drainage with diminished urine output, 350mL documented output in 24 hours.     Collaboration:  Spoke with pts family at the bedside with condition update and support provided. Training provided regarding assessment findings, disease progression, symptom management, Scattered Bed Team roles, medication indications/actions and expected length of stay. Family v/u of pts condition and all questions were answered. Provided family with \"What to Expect at the End of Life\" pamphlets for training reinforcement and encouraged them to call [facility] 24/7 with any questions or concerns. Collaborated with [facility] " RN, Gloria and CCPChanda about pts condition.    Disposition:  Patient meets GIP criteria d/t frequent administration and continued titration of IV medications to achieve and maintain symptom management. Patient requiring increasing symptom management and frequent RN assessment. If patient were to stabilize she would likely require LTC placement d/t increased daily care needs. Will continue [facility] RN visits to monitor for changes, assess needs and provide support.       Sarah Leos RN  [facility] Visit Nurse  Scattered Bed Team

## 2023-12-06 NOTE — PROGRESS NOTES
Case Management Discharge Note      Final Note: admitted to a [facility] scattered bed on 12/5/23. FORTINO Jensen RN, CCP         Selected Continued Care - Discharged on 12/5/2023 Admission date: 11/23/2023 - Discharge disposition: Swing Bed w/Planned Readmission      Destination    No services have been selected for the patient.                Durable Medical Equipment    No services have been selected for the patient.                Dialysis/Infusion    No services have been selected for the patient.                Home Medical Care    No services have been selected for the patient.                Therapy    No services have been selected for the patient.                Community Resources    No services have been selected for the patient.                Community & DME    No services have been selected for the patient.                    Selected Continued Care - Episodes Includes continued care and service providers with selected services from the active episodes listed below      Oncology- External Fill Episode start date: 10/10/2023   There are no active outsourced providers for this episode.                 Selected Continued Care - Prior Encounters Includes continued care and service providers with selected services from prior encounters from 8/25/2023 to 12/5/2023      Discharged on 11/22/2023 Admission date: 11/2/2023 - Discharge disposition: Skilled Nursing Facility (DC - External)      Destination       Service Provider Selected Services Address Phone Fax Patient Preferred    [facility] [address] [place] [id number] [phone] --              Home Medical Care       Service Provider Selected Services Address Phone Fax Patient Preferred    [facility] [address] [place] [address], [address], [facility] [address] [phone] --                      Discharged on 10/2/2023 Admission date: 9/27/2023 -  Discharge disposition: Home-Health Care [facility]      Home Medical Care       Service Provider Selected Services Address Phone Fax Patient Preferred    [facility], [address], [facility] [address] [phone] --                               Final Discharge Disposition Code: 51 - hospice medical facility

## 2023-12-06 NOTE — PLAN OF CARE
Palliative care patient pps 30% resting comfortably in bed. A&O, no c/o soa. Flipped to HSB today. F/c placed, prn morphine given x1 for c/o genital pain. Large amount of family in and out of room today. Pt agrees to wean optiflow, respiratory turned it down. Currently resting in bed with eyes closed, daughter at bedside, breathing even and unlabored free from signs of distress.

## 2023-12-06 NOTE — PLAN OF CARE
Goal Outcome Evaluation:  Plan of Care Reviewed With: patient        Progress: declining  Outcome Evaluation: Patient given one dose of 0.5mg SL ativan and 2 doses of 2mg IV morphine for perineal pain with relief of symptoms, patient slept throughout night. Family at bedside. Optiflo weaned to 30lpm by respiratory therapy, will continue comfort measures.

## 2023-12-06 NOTE — PROGRESS NOTES
[facility] Visit Report    Catherine Boyer  [id number]  12/6/2023    This patient has been admitted to [facility] Scattered Beds. This MSW provided an initial assessment.     Per the review, the pt has a PPA of 20%. Her primary diagnosis os ARF. Pt has Advance Directives in place, a Living will and POA.     MSW provided a visit.   Pt was observed sitting up in the bed, receiving oxygen via a nasal canula. Pt was awake and voiced no pain/trouble breathing.    MSW engaged in conversation with family and pt. Pt presents as awake and oriented to person and place. Pt voiced that she was happy her family was visiting and felt loved. Pt became quite drowsy and fell asleep during my visit.     Met with pts wen (Karen) separately. Provided an introduction to services and assessed for any needs. Family is al aware and accepting. Reminded the wen of our services through the [facility].     [facility]     MSW will provide visits to offer support and assess for needs.      LUZ MARIA Cordero

## 2023-12-06 NOTE — PROGRESS NOTES
Pt was unresponsive and appeared comfortable.  Pt's room was full of family.  Pt is Hinduism and daughter speaking with pt's parish to provide communion for pt.  Pt's daughter is among 5 children who each verbalize peace and acceptance with pt's prognosis and POC.  Daughter did not express any concerns regarding pt's condition or care.  [facility]  provided a prayer of closure with family at bed-side.  Daughter expressed gratitude for prayer and  support.

## 2023-12-06 NOTE — H&P
Palliative Care/Hospice Admit/Consult Note     Referring Provider: Chandni Luo MD   Reason for Consultation: Hospice care  Date of Admission:  12/5/2023    Patient Care Team:  Brett Jackson MD as PCP - General (Internal Medicine)  Darrell Reinoso MD as Consulting Physician (Hematology and Oncology)  Albin Barriga MD as Cardiologist (Cardiology)  Jethro Laughlin MD as Attending Provider (Hospice and Palliative Medicine)    Chief complaint: Acute on chronic respiratory failure with hypoxia secondary to acute on chronic diastolic heart failure; MRSA bacteremia with aortic valve vegetation; JAK2 positive myeloproliferative disorder, perhaps primary myelofibrosis    History of present illness:  The patient is a 83 y.o. female who has known myeloproliferative disorder, chronic hypoxic respiratory failure on 3 L, chronic diastolic CHF, pulmonary hypertension, paroxysmal atrial fibrillation, CAD with history of PCI, and diabetes who presented to [facility] 11/23/2023 complaining of  nausea, vomiting, and abdominal pain.  She was found to have MRSA bacteremia and was initiated on vancomycin.  No clear source of MRSA bacteremia was ever determined.  While hospitalized the patient had worsening respiratory failure and ultimately required Optiflow to maintain saturations.  Hospital course also complicated by atrial fibrillation with RVR/difficult to control rates.  Pulmonology, cardiology, oncology and infectious disease saw the patient in consultation. The patient's condition continued to deteriorate and after discussion with the patient and her children she ultimately decided to transition to comfort care. Hospice has evaluated the patient and reviewed with the family and she was discharged from acute care and readmitted as a hospice scattered bed patient 12/5/2023.  I was asked to assume her care.    At the time of my evaluation, multiple family members were in the room and I reviewed with the  patient and her family members.  At the time of my evaluation, the patient denied any pain.  She also denies shortness of breath.  She was able to turn from her back to her left side on her own.  She denied GI complaints.  Whyte catheter in place and urine was yellow.  She denied new focal weaknesses.    Review of Systems  Pertinent items are noted in HPI, all other systems reviewed and negative    Palliative Performance Scale  Palliative Performance Scale Score: 30%  [place] Symptom Assessment System Revised  Pain Score: no pain   ESAS Tiredness Score: 6  ESAS Nausea Score: No nausea  ESAS Depression Score: 3  ESAS Anxiety Score: 7  ESAS Drowsiness Score: 5  ESAS Lack of Appetite Score: 5  ESAS Wellbeing Score: Best wellbeing  ESAS Dyspnea Score: 6  ESAS Other Problem Score: Best possible response  ESAS Source of Information: patient, healthcare professional caregiver  ESAS Intervention: medicated/see MAR  ESAS Intervention Response: tolerated    History  Past Medical History:   Diagnosis Date    Atherosclerosis of abdominal aorta 02/05/2023    Atrial fibrillation     Breast cancer     CAD (coronary artery disease)     NSTEMI 2/2022: 90% ostial LAD, 99% D1, 70% mid-distal LAD (medical therapy). She received two stents (2.5x18 and 2.5x26mm Finesse LEFTY) but I don't know which one went to which lesion.    Carotid atherosclerosis     Cholecystitis 11/22/2022    Added automatically from request for surgery [id number]    Chronic diastolic (congestive) heart failure     COVID 10/29/2022    GERD (gastroesophageal reflux disease)     Glaucoma     History of cataract     Hypertension     Microcytic anemia     per Dr. oleg pate office  note 6/30/22-dd    Myeloproliferative disorder     JAK2 positive    Nonbacterial thrombotic endocarditis     6/2021: 4x5mm vegetation on the ventricular surface of the anterior MV, negative blood cultures    Porcelain gallbladder     PUD (peptic ulcer disease)     TIA (transient ischemic attack)      Type 2 diabetes mellitus     Type 2 diabetes mellitus with circulatory disorder, without long-term current use of insulin 07/14/2022    Upper GI bleed    ,   Past Surgical History:   Procedure Laterality Date    BREAST LUMPECTOMY  1999    BRONCHOSCOPY Bilateral 11/8/2023    Procedure: BRONCHOSCOPY UNDER FLUORO WITH BAL,  BIOPSIES; ACETYLCYSTEIN 4ML GIVEN;  Surgeon: Raoul Alford MD;  Location: [facility] ENDOSCOPY;  Service: Pulmonary;  Laterality: Bilateral;  PRE- PULMONARY INFILTRATES  POST- SAME    CARDIAC CATHETERIZATION N/A 09/02/2022    Procedure: Coronary angiography;  Surgeon: Guero Verde MD;  Location: [facility] CATH INVASIVE LOCATION;  Service: Cardiovascular;  Laterality: N/A;    CARDIAC CATHETERIZATION N/A 09/02/2022    Procedure: Stent LEFTY coronary;  Surgeon: Guero Verde MD;  Location: [facility] CATH INVASIVE LOCATION;  Service: Cardiovascular;  Laterality: N/A;    CARDIAC CATHETERIZATION N/A 11/29/2023    Procedure: Coronary angiography;  Surgeon: Shari De La Paz MD;  Location: [facility] CATH INVASIVE LOCATION;  Service: Cardiology;  Laterality: N/A;    CATARACT EXTRACTION  2011    CHOLECYSTECTOMY      CHOLECYSTECTOMY WITH INTRAOPERATIVE CHOLANGIOGRAM N/A 11/28/2022    Procedure: CHOLECYSTECTOMY LAPAROSCOPIC INTRAOPERATIVE CHOLANGIOGRAM;  Surgeon: Dani Grover Jr., MD;  Location: [facility] MAIN OR;  Service: General;  Laterality: N/A;    COLONOSCOPY N/A 02/09/2023    Procedure: COLONOSCOPY TO CECUM & T.I.;  Surgeon: Guero Ferris MD;  Location: [facility] ENDOSCOPY;  Service: Gastroenterology;  Laterality: N/A;  PRE- MELENA, GI BLEED  POST- DIVERTICULOSIS, INT HEMORRHOIDS    ENDOSCOPY N/A 01/25/2023    Procedure: ESOPHAGOGASTRODUODENOSCOPY WITH BIOPSIES;  Surgeon: Charles Diaz MD;  Location: [facility] ENDOSCOPY;  Service: Gastroenterology;  Laterality: N/A;  pre: abd pain, nausea  post: hiatal hernia, mild gastritis, sloughing of the esophagus    ENTEROSCOPY SMALL BOWEL N/A 02/09/2023     "Procedure: ENTEROSCOPY SMALL BOWEL;  Surgeon: Guero Ferris MD;  Location: [facility] ENDOSCOPY;  Service: Gastroenterology;  Laterality: N/A;  PRE- MELENA, GI BLEED  POST- HIATAL HERNIA    JOINT REPLACEMENT      UPPER GASTROINTESTINAL ENDOSCOPY     ,   Family History   Problem Relation Age of Onset    Ovarian cancer Mother     Lung cancer Father     Lung cancer Sister     Lit Hyperthermia Neg Hx    , and   Social History     Socioeconomic History    Marital status:    Tobacco Use    Smoking status: Former     Packs/day: 1.00     Years: 20.00     Additional pack years: 0.00     Total pack years: 20.00     Types: Cigarettes     Quit date: 1950     Years since quittin.0     Passive exposure: Past    Smokeless tobacco: Never    Tobacco comments:     30  years ago   Vaping Use    Vaping Use: Never used   Substance and Sexual Activity    Alcohol use: Not Currently     Comment: \"rare\"    Drug use: Never    Sexual activity: Defer     E-cigarette/Vaping    E-cigarette/Vaping Use Never User     Passive Exposure No     Counseling Given No      E-cigarette/Vaping Substances    Nicotine No     THC No     CBD No     Flavoring No      E-cigarette/Vaping Devices    Disposable No     Pre-filled or Refillable Cartridge No     Refillable Tank No     Pre-filled Pod No       Allergy Aspirin, Diphenhydramine hcl, Hydroxyurea, and Oxycodone    Vital Signs   Temp:  [97 °F (36.1 °C)] 97 °F (36.1 °C)  Heart Rate:  [] 103  Resp:  [18] 18  BP: (115-130)/(53-63) 130/63  Device (Oxygen Therapy): high-flow nasal cannula;humidifiedFlow (L/min):  [6-40] 6SpO2:  [85 %-97 %] 95 %    Physical Exam:  General Appearance:   Awake and appears in no acute distress while lying on her back with head of bed elevated 10-20 degrees, chronically ill-appearing elderly female   Head:    Normocephalic, without obvious abnormality, atraumatic   Eyes:            Lids and lashes normal, conjunctivae and sclerae normal, no icterus   Ears:    " Ears appear intact with no abnormalities noted   Throat:   No oral lesions, oral mucosa somewhat moist   Neck:   No adenopathy, supple, trachea midline, no thyromegaly   Back:     No scoliosis present   Lungs:     Clear to auscultation, respirations diminished and not labored    Heart:    Regular rhythm and tachycardia    Breast Exam:    Deferred   Abdomen:     Soft and non-tender, non-distended   Genitalia:    Deferred   Extremities:  No edema, pale and no cyanosis    Pulses:  Radial pulses palpable and equal bilaterally   Skin:   No bleeding         Neurologic: Awake and appears alert and denies new focal weaknesses       Results Review:   I reviewed the patient's new clinical results.    Impression:      Acute on chronic respiratory failure with hypoxia    Acute on chronic diastolic heart failure    Hospice care patient    Myeloproliferative disorder    Myelofibrosis    MRSA bacteremia    Aortic valve vegetation    Pulmonary hypertension    Type 2 diabetes mellitus with hyperglycemia    Other cirrhosis of liver    PAF (paroxysmal atrial fibrillation)    Iron deficiency anemia    Hypertension    Personal history of transient ischemic attack (TIA), 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 deficits    Atrial fibrillation with RVR    Mitral valve stenosis, mild    Moderate mitral valve regurgitation    Aortic valve stenosis, mild    Cerebrovascular small vessel disease    Cerebral atrophy    Splenomegaly      Plan:  I reviewed the patient's admission and previous medical records.  I reviewed with the patient's RN.  I reviewed with the patient and examined her.  I reviewed with the patient's family.  With medications previously administered, the patient appears comfortable.  The patient has required 2 doses of 2 mg morphine and 1 dose of 0.5 mg Ativan thus far today.  Medications will be continued and adjusted as needed for symptom management for comfort.  No attempts at resuscitation will be made.  Answered all of  the patient's questions as well as the patient's family's questions.      Jethro Laughlin MD  Hospice and Palliative Medicine  12/06/23  17:19 EST

## 2023-12-07 NOTE — PLAN OF CARE
Problem: Adult Inpatient Plan of Care  Goal: Plan of Care Review  Outcome: Ongoing, Progressing  Flowsheets (Taken 12/7/2023 0605)  Progress: declining  Plan of Care Reviewed With:   patient   daughter   son  Outcome Evaluation: PPS 30%. Patient is alert and oriented. Patient is on 6 L O2 humidified high-flow nasal cannula. Daughter and son at bedside. Patient complained of genital pain but refused me to assess it last night.This morning patient woke up in a lot of pain and family at bedside and patient herself requested to have sierra cathter removed. Sierra catheter was removed and immediate pain relief was noted. Placed brief for urinary incontinence. Continue with plan of care.   Goal Outcome Evaluation:  Plan of Care Reviewed With: patient, daughter, son        Progress: declining  Outcome Evaluation: PPS 30%. Patient is alert and oriented. Patient is on 6 L O2 humidified high-flow nasal cannula. Daughter and son at bedside. Patient complained of genital pain but refused me to assess it last night.This morning patient woke up in a lot of pain and family at bedside and patient herself requested to have sierra cathter removed. Sierra catheter was removed and immediate pain relief was noted. Placed brief for urinary incontinence. Continue with plan of care.

## 2023-12-07 NOTE — PROGRESS NOTES
Palliative Care/Hospice Follow Up Note       LOS: 2 days   Patient Care Team:  Brett Jackson MD as PCP - General (Internal Medicine)  Darrell Reinoso MD as Consulting Physician (Hematology and Oncology)  Albin Barriga MD as Cardiologist (Cardiology)  Jethro Laughlin MD as Attending Provider (Hospice and Palliative Medicine)    Chief Complaint:  Acute on chronic respiratory failure with hypoxia secondary to acute on chronic diastolic heart failure; MRSA bacteremia with aortic valve vegetation; JAK2 positive myeloproliferative disorder, perhaps primary myelofibrosis     Interval History:     Patient Complaints: None  Patient Denies:  None  History taken from:  Family and patient and RN  Review of Systems:  As above.    Palliative Performance Scale  Palliative Performance Scale Score: 30%  [place] Symptom Assessment System Revised  Pain Score: 2   ESAS Tiredness Score: 5  ESAS Nausea Score: No nausea  ESAS Depression Score: No depression  ESAS Anxiety Score: No anxiety  ESAS Drowsiness Score: 5  ESAS Lack of Appetite Score: 7  ESAS Wellbeing Score: 3  ESAS Dyspnea Score: 3  ESAS Other Problem Score: Best possible response  ESAS Source of Information: healthcare professional caregiver  ESAS Intervention: medicated/see MAR  ESAS Intervention Response: tolerated    Vital Signs  Temp:  [97.3 °F (36.3 °C)] 97.3 °F (36.3 °C)  Heart Rate:  [101-111] 101  Resp:  [16] 16  BP: (114-164)/(61-92) 114/61  Device (Oxygen Therapy): humidified;high-flow nasal cannulaFlow (L/min):  [6] 6SpO2:  [94 %-95 %] 95 %    Physical Exam:  General Appearance:    Awakened and appears in no acute distress while lying on her back with head of bed elevated 30-40 degrees, chronically ill-appearing elderly female    Throat:   No oral lesions, oral mucosa somewhat moist   Neck:   No adenopathy, supple, trachea midline   Lungs:     Clear to auscultation, respirations diminished and not labored    Heart:    Regular rhythm and tachycardia    Abdomen:     Soft and non-tender, non-distended   Extremities:   No edema, pale and no no cyanosis   Pulses:   Radial pulses palpable and equal bilaterally          Results Review:     I reviewed the patient's new clinical results.    Medication Reviewed.    Assessment & Plan       Acute on chronic respiratory failure with hypoxia    Acute on chronic diastolic heart failure    Hospice care patient    Myeloproliferative disorder    Myelofibrosis    MRSA bacteremia    Aortic valve vegetation    Pulmonary hypertension    Type 2 diabetes mellitus with hyperglycemia    Other cirrhosis of liver    PAF (paroxysmal atrial fibrillation)    Iron deficiency anemia    Hypertension    Personal history of transient ischemic attack (TIA), 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 deficits    Atrial fibrillation with RVR    Mitral valve stenosis, mild    Moderate mitral valve regurgitation    Aortic valve stenosis, mild    Cerebrovascular small vessel disease    Cerebral atrophy    Splenomegaly      I reviewed with the patient after she aroused and she fell back to sleep easily since being medicated with Ativan for some increased anxiety. I reviewed with the patient's family. I reviewed with the patient's RN.  With medications previously administered, the patient appears comfortable.  The patient has required 2 doses of 2 mg morphine and 1 dose of 0.5 mg Dilaudid thus far today, 3 doses yesterday, and 2 doses 0.5 mg Ativan concentrated solution, 2 doses yesterday.  Medications will be continued and adjusted as needed for symptom management for comfort.  No attempts at resuscitation will be made.  I answered all of the patient's questions as well as the patient's family's questions.     Plan for disposition:  DUNCAN Laughlin MD  Hospice and Palliative Medicine  12/07/23  18:54 EST

## 2023-12-07 NOTE — PROGRESS NOTES
[facility] Visit Report     Catherine Boyer  [id number]  12/7/2023     Admission R/T [facility] Dx: yes     Reason for [facility] Admission: Acute respiratory failure with hypoxia     Review of Visit: Patient is an 84yo female with a primary [facility] diagnosis of acute respiratory failure with hypoxia. Admitted to [facility] for GIP for EOL care and symptom management of pain, dyspnea and anxiety. Patient is currently in contact isolation for positive blood culture on 11/25, MRSA. [facility] RN donned appropriate garb prior to entering pts room and maintained throughout the visit.     PPS: 20%        Medications in 24 hours:  -IV Morphine 2mg PRN x3  -IV dilaudid 0.5mg PRN x1  -SL Ativan 0.5mg x3     Recommendations:  Continue to monitor for signs of decline and provide comfort measures. Contact [facility] at [phone] with any questions or concerns and at TOD.      Assessment:  Pt was in bed with her family at bedside. She was awake and alert but dozed off multiple times during the visit. She only had a few bites of her breakfast today but is taking in fluids. They removed her f/c per her and family request and was given PRN dilaudid prior to removal. Per family she tolerated the removal very well. Lungs clear on 6L. B/s hypoactive. Peripheral pulses palpable and pt is warm to touch. No void since f/c removal. [facility] 12/3/23.     Collaboration:  Spoke with pts multiple family members at the bedside with condition update and support provided. Training provided regarding assessment findings, disease progression, symptom management, Scattered Bed Team roles, medication indications/actions and expected length of stay. Family v/u of pts condition and all questions were answered.  Encouraged them to call [facility] 24/7 with any questions or concerns. Collaborated with [facility] RN, and Chanda about pts condition.     Disposition:  Patient meets GIP criteria d/t frequent administration and continued titration of IV medications  to achieve and maintain symptom management. Patient requiring increasing symptom management and frequent RN assessment. If patient were to stabilize she would likely require LTC placement d/t increased daily care needs. Will continue [facility] RN visits to monitor for changes, assess needs and provide support.         Josselin Serrato RN  [facility] Health Visit Nurse  Scattered Bed Team

## 2023-12-08 NOTE — PROGRESS NOTES
[facility] Visit Report     Catherine Boyer  [id number]  12/7/2023     Admission R/T [facility] Dx: yes     Reason for [facility] Admission: Acute respiratory failure with hypoxia     Review of Visit: Patient is an 82yo female with a primary [facility] diagnosis of acute respiratory failure with hypoxia. Admitted to [facility] for GIP for EOL care and symptom management of pain, dyspnea and anxiety. Patient is currently in contact isolation for positive blood culture on 11/25, MRSA. [facility] prior to entering pts room and maintained throughout the visit.     PPS: 20%        Medications in 24 hours:  -IV dilaudid 0.5mg PRN x2  -SL Ativan 0.5mg x3                        1mg x1     Recommendations:  Continue to monitor for signs of decline and provide comfort measures. Contact [facility] at [phone] with any questions or concerns and at TOD.      Assessment:  Pt was in bed with her family at bedsideShe wassleeping today but woke up for a few minutes before falling abck to sleep. Pt has removed her 02 and family have decided not to put it back on at this time due to the increased agitation it was causing her.   Per family she tolerated the removal very well. Lungs clear r/a. B/s hypoactive. Peripheral pulses palpable and pt is warm to touch. No void since f/c removal. [facility] 12/3/23.      Collaboration:  Spoke with pts multiple family members at the bedside with condition update and support provided. Training provided regarding assessment findings, disease progression, symptom management, Scattered Bed Team roles, medication indications/actions and expected length of stay. Family v/u of pts condition and all questions were answered.  Encouraged them to call [facility] 24/7 with any questions or concerns. Collaborated with [facility] RN, and Chanda about pts condition.     Disposition:  Patient meets GIP criteria d/t frequent administration and continued titration of IV medications to achieve and  maintain symptom management. Patient requiring increasing symptom management and frequent RN assessment. If patient were to stabilize she would likely require LTC placement d/t increased daily care needs. Will continue [facility] RN visits to monitor for changes, assess needs and provide support.         Josselin Serrato RN  [email or web address] Health Visit Nurse  Scattered Bed Team

## 2023-12-08 NOTE — PROGRESS NOTES
Palliative Care/Hospice Follow Up Note       LOS: 3 days   Patient Care Team:  Brett Jackson MD as PCP - General (Internal Medicine)  Darrell Reinoso MD as Consulting Physician (Hematology and Oncology)  Albin Barriga MD as Cardiologist (Cardiology)  Jethro Laughlin MD as Attending Provider (Hospice and Palliative Medicine)    Chief Complaint:  Acute on chronic respiratory failure with hypoxia secondary to acute on chronic diastolic heart failure; MRSA bacteremia with aortic valve vegetation; JAK2 positive myeloproliferative disorder, perhaps primary myelofibrosis     Interval History:     Patient Complaints: None  Patient Denies:  None  History taken from:  Sons and patient and RN  Review of Systems:  As above.    Palliative Performance Scale  Palliative Performance Scale Score: 30%  [place] Symptom Assessment System Revised  Pain Score: no pain   ESAS Tiredness Score: 5  ESAS Nausea Score: No nausea  ESAS Depression Score: No depression  ESAS Anxiety Score: No anxiety  ESAS Drowsiness Score: 5  ESAS Lack of Appetite Score: 7  ESAS Wellbeing Score: 3  ESAS Dyspnea Score: 3  ESAS Other Problem Score: Best possible response  ESAS Source of Information: healthcare professional caregiver  ESAS Intervention: medicated/see MAR  ESAS Intervention Response: tolerated    Vital Signs  Temp:  [97.1 °F (36.2 °C)] 97.1 °F (36.2 °C)  Heart Rate:  [] 98  Resp:  [14-16] 14  BP: (103-114)/(57-61) 103/57  Device (Oxygen Therapy): high-flow nasal cannulaFlow (L/min):  [6] 6SpO2:  [94 %-96 %] 96 %    Physical Exam:  General Appearance:    Awakened and appears in no acute distress while lying on her back with head of bed elevated 30-40 degrees, chronically ill-appearing elderly female    Throat:   No oral lesions, oral mucosa somewhat moist   Neck:   No adenopathy, supple, trachea midline   Lungs:     Clear to auscultation, respirations diminished and not labored    Heart:    Regular rhythm and normal rate   Abdomen:      Soft and non-tender, non-distended   Extremities:   No edema, pale and no no cyanosis   Pulses:   Radial pulses palpable and equal bilaterally          Results Review:     I reviewed the patient's new clinical results.    Medication Reviewed.    Assessment & Plan       Acute on chronic respiratory failure with hypoxia    Acute on chronic diastolic heart failure    Hospice care patient    Myeloproliferative disorder    Myelofibrosis    MRSA bacteremia    Aortic valve vegetation    Pulmonary hypertension    Type 2 diabetes mellitus with hyperglycemia    Other cirrhosis of liver    PAF (paroxysmal atrial fibrillation)    Iron deficiency anemia    Hypertension    Personal history of transient ischemic attack (TIA), 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 deficits    Atrial fibrillation with RVR    Mitral valve stenosis, mild    Moderate mitral valve regurgitation    Aortic valve stenosis, mild    Cerebrovascular small vessel disease    Cerebral atrophy    Splenomegaly      I reviewed with the patient's sons who describes some agitation during the night, sundowning?.  I explained her agitation is multifactorial.  The patient did awaken and answered some of my questions. With medications previously administered, the patient appears comfortable.  The patient has required 1 dose of 0,5 mg Dilaudid, 3 doses yesterday, and 1 dose 0.5 mg Ativan concentrated solution, 2 doses yesterday, thus far today.  Medications will be continued and adjusted as needed for symptom management for comfort.  No attempts at resuscitation will be made.  I answered all of the patient's sons' questions.  Again, I explained her agitation as a result of her declining disease process.      Plan for disposition:  DUNCAN Laughlin MD  Hospice and Palliative Medicine  12/08/23  16:32 EST

## 2023-12-08 NOTE — PLAN OF CARE
"Goal Outcome Evaluation:           Progress: no change  Outcome Evaluation: Pt is aox4, PPS 30%. Pt medicated x2 with ativan or anxiety. Pt given prn morphine 2mg for pain. Pt states\" I'm ready to go\". Sons at bedside. Pt resting comfortably at this time. Will continue to monitor.         "

## 2023-12-09 NOTE — PLAN OF CARE
Goal Outcome Evaluation:  Plan of Care Reviewed With: patient        Progress: no change  Outcome Evaluation: Pt medicated with 0.5mg dilaudid x2, 1mg dilaudid x1, 1mg SL ativan x2 for pain and restlessness.  Daughter at bedside all night.  Will continue to monitor.

## 2023-12-09 NOTE — PLAN OF CARE
Goal Outcome Evaluation:  Plan of Care Reviewed With: patient, family        Progress: declining  Outcome Evaluation: Pt is medicated PRN with 1mg IV dilaudid and 1mg SL ativan. Pt appearing more agitated at times, pulling at gown/undressing and restless. RN educated family on nonverbal s/s of anxiety/agitation and availability of medication to help. Will continue to monitor and provide palliative care.

## 2023-12-09 NOTE — PROGRESS NOTES
Palliative Care/Hospice Follow Up Note       LOS: 4 days   Patient Care Team:  Brett Jackson MD as PCP - General (Internal Medicine)  Darrell Reinoso MD as Consulting Physician (Hematology and Oncology)  Albin Barriga MD as Cardiologist (Cardiology)  Jethro Laughlin MD as Attending Provider (Hospice and Palliative Medicine)    Chief Complaint:  Acute on chronic respiratory failure with hypoxia secondary to acute on chronic diastolic heart failure; MRSA bacteremia with aortic valve vegetation; JAK2 positive myeloproliferative disorder, perhaps primary myelofibrosis     Interval History:     Patient Complaints: None  Patient Denies:  None  History taken from: Daughter and RN  Review of Systems:  As above.    Palliative Performance Scale  Palliative Performance Scale Score: 30%  [place] Symptom Assessment System Revised  Pain Score: 1   ESAS Tiredness Score: 6  ESAS Nausea Score: No nausea  ESAS Depression Score: No depression  ESAS Anxiety Score: 2  ESAS Drowsiness Score: 6  ESAS Lack of Appetite Score: unable to assess  ESAS Wellbeing Score: 3  ESAS Dyspnea Score: 3  ESAS Other Problem Score: Best possible response  ESAS Source of Information: healthcare professional caregiver, family caregiver  ESAS Intervention: medicated/see MAR  ESAS Intervention Response: tolerated    Vital Signs  Temp:  [96.9 °F (36.1 °C)-97.2 °F (36.2 °C)] 96.9 °F (36.1 °C)  Heart Rate:  [105-117] 117  Resp:  [16-20] 20  BP: ()/(53-59) 123/59  Device (Oxygen Therapy): room air SpO2:  [52 %-81 %] 52 %    Physical Exam:  General Appearance:  Not really awake and appears in no acute distress while lying on her back with head of bed elevated 30-40 degrees, slightly restless trying to remove her gown, chronically ill-appearing elderly female    Throat:   No oral lesions, oral mucosa somewhat moist   Neck:   No adenopathy, supple, trachea midline   Lungs:     Clear to auscultation, respirations diminished and not labored    Heart:     Regular rhythm and tachycardia    Abdomen:     Soft and non-tender, non-distended   Extremities:   No edema, pale and no no cyanosis   Pulses:   Radial pulses palpable and equal bilaterally          Results Review:     I reviewed the patient's new clinical results.    Medication Reviewed.    Assessment & Plan       Acute on chronic respiratory failure with hypoxia    Acute on chronic diastolic heart failure    Hospice care patient    Myeloproliferative disorder    Myelofibrosis    MRSA bacteremia    Aortic valve vegetation    Pulmonary hypertension    Type 2 diabetes mellitus with hyperglycemia    Other cirrhosis of liver    PAF (paroxysmal atrial fibrillation)    Iron deficiency anemia    Hypertension    Personal history of transient ischemic attack (TIA), 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 deficits    Atrial fibrillation with RVR    Mitral valve stenosis, mild    Moderate mitral valve regurgitation    Aortic valve stenosis, mild    Cerebrovascular small vessel disease    Cerebral atrophy    Splenomegaly      I reviewed with the patient's daughter who describes some restlessness at times.  The patient did not awaken and interact and answer questions as before. With medications previously administered, the patient appears comfortable.  The patient has required 1 dose of 0.5 mg Dilaudid, 4 doses yesterday, and 1 dose 1 mg Ativan concentrated solution, 4 doses yesterday, thus far today.  Medications will be continued and adjusted as needed for symptom management for comfort.  No attempts at resuscitation will be made.  I answered all of the patient's daughters questions.  Again, I explained her agitation is a result of her declining disease process.  Additionally, the patient's requiring increased amounts of medicines to calm her down.  Additionally, O2 saturations also decreased, real or secondary to increasing WBC?    Plan for disposition:  HSDHEERAJ Laughlin MD  Hospice and Palliative  Medicine  12/09/23  09:37 EST

## 2023-12-10 NOTE — PLAN OF CARE
Goal Outcome Evaluation:  Plan of Care Reviewed With: patient        Progress: declining  Outcome Evaluation: Pt premedicated prior to turns with 1mg dilaudid and 1mg SL ativan.  Family remains at bedside.  Pt appears comfortable.  Will continue to monitor.

## 2023-12-10 NOTE — PROGRESS NOTES
Palliative Care/Hospice Follow Up Note       LOS: 5 days   Patient Care Team:  Brett Jackson MD as PCP - General (Internal Medicine)  Darrell Reinoso MD as Consulting Physician (Hematology and Oncology)  Albin Barriga MD as Cardiologist (Cardiology)  Jethro Laughlin MD as Attending Provider (Hospice and Palliative Medicine)    Chief Complaint:  Acute on chronic respiratory failure with hypoxia secondary to acute on chronic diastolic heart failure; MRSA bacteremia with aortic valve vegetation; JAK2 positive myeloproliferative disorder, perhaps primary myelofibrosis     Interval History:     Patient Complaints: None  Patient Denies:  None  History taken from: Family and RN  Review of Systems:  As above.    Palliative Performance Scale  Palliative Performance Scale Score: 10%  [place] Symptom Assessment System Revised  Pain Score: 2   ESAS Tiredness Score: 8  ESAS Nausea Score: No nausea  ESAS Depression Score: 2  ESAS Anxiety Score: 7  ESAS Drowsiness Score: 9  ESAS Lack of Appetite Score: 8  ESAS Wellbeing Score: 7  ESAS Dyspnea Score: 3  ESAS Other Problem Score: Best possible response  ESAS Source of Information: healthcare professional caregiver  ESAS Intervention: medicated/see MAR  ESAS Intervention Response: tolerated    Vital Signs  Temp:  [99.1 °F (37.3 °C)] 99.1 °F (37.3 °C)  Heart Rate:  [106-123] 106  Resp:  [12-16] 12  BP: (108)/(59) 108/59  Device (Oxygen Therapy): room air SpO2:  [46 %-64 %] 64 %    Physical Exam:  General Appearance:  Not awake and appears in no acute distress while lying on her back with head of bed elevated 20-30 degrees, chronically ill-appearing elderly female    Throat:   No oral lesions, oral mucosa somewhat moist   Neck:   No adenopathy, supple, trachea midline   Lungs:     Clear to auscultation with mild scattered rhonchi, respirations diminished with slight increase inspiratory effort, agonal like     Heart:    Regular rhythm and tachycardia    Abdomen:     Soft and  non-tender, non-distended   Extremities:   No edema, pale and no no cyanosis   Pulses:   Radial pulses palpable and equal bilaterally          Results Review:     I reviewed the patient's new clinical results.    Medication Reviewed.    Assessment & Plan       Acute on chronic respiratory failure with hypoxia    Acute on chronic diastolic heart failure    Hospice care patient    Myeloproliferative disorder    Myelofibrosis    MRSA bacteremia    Aortic valve vegetation    Pulmonary hypertension    Type 2 diabetes mellitus with hyperglycemia    Other cirrhosis of liver    PAF (paroxysmal atrial fibrillation)    Iron deficiency anemia    Hypertension    Personal history of transient ischemic attack (TIA), 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 deficits    Atrial fibrillation with RVR    Mitral valve stenosis, mild    Moderate mitral valve regurgitation    Aortic valve stenosis, mild    Cerebrovascular small vessel disease    Cerebral atrophy    Splenomegaly      I reviewed with the patient's family at bedside.  The patient is not awake and her respiratory status has declined.  With medications previously administered, the patient appears comfortable.  The patient has required 2 doses of 1 mg Dilaudid, 5 doses yesterday, and 2 doses 1 mg Ativan concentrated solution, 5 doses yesterday, thus far today.  Medications will be continued and adjusted as needed for symptom management for comfort.  No attempts at resuscitation will be made.  I answered all of the patient's family's questions.  They are aware of the patient's declining condition and I told them that death can occur today or tomorrow.    Plan for disposition:  DUNCAN Laughlin MD  Hospice and Palliative Medicine  12/10/23  08:12 EST

## 2023-12-10 NOTE — DISCHARGE SUMMARY
Discharge As      Date of Admisssion:  2023  Date of Death:  12/10/2023  Time of Death:  8:30 AM    Patient Care Team:  Brett Jackson MD as PCP - General (Internal Medicine)  Darrell Reinoso MD as Consulting Physician (Hematology and Oncology)  Albin Barriga MD as Cardiologist (Cardiology)  Jethro Laughlin MD as Attending Provider (Hospice and Palliative Medicine)    Final Diagnosis:     Acute on chronic respiratory failure with hypoxia    Acute on chronic diastolic heart failure    Hospice care patient    Myeloproliferative disorder    Myelofibrosis    MRSA bacteremia    Aortic valve vegetation    Pulmonary hypertension    Type 2 diabetes mellitus with hyperglycemia    Other cirrhosis of liver    PAF (paroxysmal atrial fibrillation)    Iron deficiency anemia    Hypertension    Personal history of transient ischemic attack (TIA), 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 deficits    Atrial fibrillation with RVR    Mitral valve stenosis, mild    Moderate mitral valve regurgitation    Aortic valve stenosis, mild    Cerebrovascular small vessel disease    Cerebral atrophy    Splenomegaly      Hospital Course  Patient was a 83 y.o. female who has known JAK2 positive myeloproliferative disorder, perhaps primary myelofibrosis, chronic hypoxic respiratory failure on 3 L, chronic diastolic CHF, pulmonary hypertension, paroxysmal atrial fibrillation, CAD with history of PCI, and diabetes who presented to [facility] 2023 complaining of nausea, vomiting, and abdominal pain.  She was found to have MRSA bacteremia and was initiated on vancomycin.  No clear source of MRSA bacteremia was ever determined.  While hospitalized the patient had worsening respiratory failure and ultimately required Optiflow to maintain saturations.  Hospital course also complicated by atrial fibrillation with RVR/difficult to control rates.  Pulmonology, cardiology, oncology and infectious disease saw the patient  in consultation. The patient's condition continued to deteriorate and after discussion with the patient and her children she ultimately decided to transition to comfort care. Hospice has evaluated the patient and reviewed with the family and she was discharged from acute care and readmitted as a hospice scattered bed patient 12/5/2023.  Medications were provided and adjusted as needed for symptom management for comfort.  I was able to discussed with the patient's family daily.  Yesterday, the patient had demonstrated decline.  When seen earlier this a.m., 12/10/2023 at 0740 hrs., I described to the patient's family the patient's significant decline.  Multiple family members present.  They all were aware of the patient's marked decline.  Subsequently, I was called that the patient's respirations ceased and no pulse palpable. No heart sounds audible. I pronounced the patient at 0830 hours.    Time: I spent 40 minutes on this discharge activity which included: face-to-face encounter with the patient, face-to-face encounter with the patient's family, reviewing the data in the system, coordination of the care with the nursing staff as well as documentation and entering orders.       Jethro Laughlin MD  Hospice and Palliative Medicine  12/10/23  08:56 EST

## 2023-12-12 NOTE — PAYOR COMM NOTE
"Maribell Boyer (Dcsd. Female)          DC SUMMARY FOR [id number]          Date of Birth   1940    Social Security Number       Address   [address] [place] [address]    Home Phone   [phone]    MRN   [id number]       Mandaen   Hinduism    Marital Status                               Admission Date   11/23/23    Admission Type   Emergency    Admitting Provider   Ugo Case MD    Attending Provider   Jethro Laughlin MD    Department, Room/Bed   [address], P485/1       Discharge Date   12/5/2023    Discharge Disposition   Hospice/Medical Facility (DC - External)    Discharge Destination                                 Attending Provider: Jethro Laughlin MD    Allergies: Aspirin, Diphenhydramine Hcl, Hydroxyurea, Oxycodone    Isolation: None   Infection: MRSA (11/25/23)   Code Status: Prior    Ht: 157.5 cm (62\")   Wt: 63 kg (139 lb)    Admission Cmt: None   Principal Problem: Acute on chronic respiratory failure with hypoxia [J96.21]                   Active Insurance as of 11/23/2023       Primary Coverage       Payor Plan Insurance Group Employer/Plan Group    AETNA MEDICARE REPLACEMENT AETNA MEDICARE REPLACEMENT [id number]       Payor Plan Address Payor Plan Phone Number Payor Plan Fax Number Effective Dates    [address] [phone]  1/1/2022 - None Entered    [facility] [id number]         Subscriber Name Subscriber Birth Date Member ID       MARIBELL BOYER 1940 [id number]               Secondary Coverage       Payor Plan Insurance Group Employer/Plan Group    [facility]       Payor Plan Address Payor Plan Phone Number Payor Plan Fax Number Effective Dates    [address] [phone]  11/23/2023 - None Entered    [facility] [id number]         Subscriber Name Subscriber Birth Date Member ID       MARIBELL BOYER 1940 [id number]               "       Emergency Contacts        (Rel.) Home Phone Work Phone Mobile Phone    Karen Gutierrez (1stPOA) (Daughter) -- -- [phone]    Karan Boyer (2ndPOA) (Son) -- -- [phone]    RAY BOYER (Daughter) [phone] -- [phone]    Greg Boyer (Son) -- -- [phone]    JOCELYNMEG (Brother) -- -- [phone]                 Discharge Summary        Chandni Luo MD at 23 1727              Patient Name: Catherine Boyer  : 1940  MRN: [id number]    Date of Admission: 2023  Date of Discharge:  2023  Primary Care Physician: Brett Jackson MD      Chief Complaint:   Vomiting, Nausea, and Abdominal Pain      Discharge Diagnoses     Active Hospital Problems    Diagnosis  POA    **Acute respiratory failure with hypoxia [J96.01]  Unknown    Atrial fibrillation with RVR [I48.91]  Unknown    MRSA bacteremia [R78.81, B95.62]  Yes    CAP (community acquired pneumonia) [J18.9]  Yes    Personal history of transient ischemic attack (TIA), 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        Hospital Course     Ms. Boyer is a 83 y.o. female with a history of myeloproliferative disorder, chronic hypoxic respiratory failure on 3 L, chronic diastolic CHF, pulmonary hypertension, paroxysmal atrial fibrillation, CAD with history of PCI, diabetes who presented to [facility] initially complaining of  nausea, vomiting, abdominal pain.  Please see the admitting history  and physical for further details.  She was found to have MRSA bacteremia and was initiated on vancomycin.  No clear source of MRSA bacteremia was ever determined.  While hospitalized the patient had worsening respiratory failure and ultimately required Optiflow to maintain saturations.  Hospital course also complicated by atrial fibrillation with RVR/difficult to control rates.  Pulmonology, cardiology, oncology and infectious disease saw the patient in consultation. The patient's condition continued to deteriorate and after discussion with the patient and her children she has ultimately decided to transition to comfort care. Hospice has evaluated and she will transition to a scatterbed today.   Consultants     Consult Orders (all) (From admission, onward)       Start     Ordered    12/04/23 0752  Inpatient Hospice / [facility] Consult  Once        Specialty:  Hospice and Palliative Medicine  Provider:  (Not yet assigned)    12/04/23 0751    12/03/23 1209  Inpatient Palliative Care Team Consult  Once        Provider:  (Not yet assigned)    12/03/23 1209    11/29/23 1011  Inpatient Pulmonology Consult  Once        Specialty:  Pulmonary Disease  Provider:  Gualberto Luther Jr., MD    11/29/23 1010    11/29/23 0832  Inpatient Cardiology Consult  Once        Specialty:  Cardiology  Provider:  Albin Barriga MD    11/29/23 0832    11/28/23 1516  IV TEAM - Consult for PICC Line (IV Team to Determine Number of Lumens)  Once        Provider:  (Not yet assigned)    11/28/23 1515    11/27/23 0815  Inpatient Cardiology Consult  Once        Specialty:  Cardiology  Provider:  Eve Anderson MD    11/27/23 0814    11/25/23 1213  Inpatient Access Center Consult  Once        Provider:  (Not yet assigned)    11/25/23 1212    11/25/23 0857  Inpatient Infectious Diseases Consult  Once        Specialty:  Infectious Diseases  Provider:  Mack Vaca MD    11/25/23 0856    11/24/23 1007  Hematology & Oncology Inpatient  Consult  Once        Specialty:  Hematology and Oncology  Provider:  Darrell Reinoso MD    11/24/23 1010    11/24/23 0556  Inpatient Case Management  Consult  Once        Provider:  (Not yet assigned)    11/24/23 0556    11/24/23 0159  [facility] (on-call MD unless specified) Details  Once        Specialty:  Hospitalist  Provider:  (Not yet assigned)    11/24/23 0158    Signed and Held  [facility] (on-call MD unless specified) Details  Once        Specialty:  Hospitalist  Provider:  (Not yet assigned)    Signed and Held                  Procedures     Imaging Results (All)       Procedure Component Value Units Date/Time    XR Chest 1 View [[id number]] Collected: 12/02/23 1338     Updated: 12/02/23 1348    Narrative:      PORTABLE CHEST X-RAY     HISTORY: Follow-up pulmonary infiltrates.     TECHNIQUE: Portable chest x-ray is correlated with multiple recent prior  exams.     FINDINGS: There are clips from cholecystectomy and advanced arthritic  change of both shoulders.     The cardiomediastinal contours appear normal. There appears to be some  pleural fluid in the lower left hemithorax. Opacity in the lungs  bilaterally with a perihilar configuration is again observed and does  not appear significantly changed in comparison with the 11/30/2023 exam.       Impression:      Bilateral pulmonary opacities appear similar as on recent  x-rays. There may be a small left pleural effusion. No new abnormality.     This report was finalized on 12/2/2023 1:45 PM by Dr. Charles Tirado M.D on Workstation: [id number]       XR Chest 1 View [[id number]] Collected: 11/30/23 1332     Updated: 11/30/23 1338    Narrative:      XR CHEST 1 VW-     HISTORY: Female who is 83 years-old, dyspnea     TECHNIQUE: Frontal view of the chest     COMPARISON: 11/29/2023     FINDINGS: The heart size is borderline. Extensive bilateral infiltrates  appear similar to prior exam. There may be mild left pleural effusion.  No pneumothorax. No acute osseous  process.       Impression:      Extensive bilateral infiltrates appear similar to prior  exam.           This report was finalized on 11/30/2023 1:35 PM by Dr. Reilly Ritter M.D on Workstation: [id number]       XR Chest 1 View [[id number]] Collected: 11/29/23 0913     Updated: 11/29/23 0923    Narrative:      PORTABLE CHEST X-RAY     HISTORY: Chest pain.     TECHNIQUE: Portable chest x-ray is provided and correlated with recent  prior exams.     FINDINGS: The cardiac silhouette appears enlarged but unchanged. Fairly  dense abnormal parenchymal opacity in the lungs bilaterally has a  symmetrical, perihilar distribution. This appears more dense and more  extensive than on CT angiogram chest 11/24/2023 or chest x-ray  11/20/2023. No pneumothorax.       Impression:      Interval worsening in pulmonary parenchymal opacity in the  perihilar distribution which may represent progressive pulmonary edema  or pneumonia.     This report was finalized on 11/29/2023 9:20 AM by Dr. Charles Tirado M.D on Workstation: [id number]       CT Angiogram Abdomen Pelvis [[id number]] Collected: 11/24/23 0125     Updated: 11/24/23 0125    Narrative:        Patient: MARIBELL RUGGIERO  Time Out: 01:24  Exam(s): CTA ABDOMEN + PELVIS     EXAM:    CT Angiography Abdomen and Pelvis With Intravenous Contrast    CLINICAL HISTORY:       Concern for acute aortic syndrome.    TECHNIQUE:    Axial computed tomographic angiography images of the abdomen and pelvis   with intravenous contrast.  CTDI is 37 mGy and DLP is 934 mGy-cm.  This   CT exam was performed according to the principle of ALARA (As Low As   Reasonably Achievable) by using one or more of the following dose   reduction techniques: automated exposure control, adjustment of the mA   and or kV according to patient size, and or use of iterative   reconstruction technique.    MIP reconstructed images were created and reviewed.    COMPARISON:    No relevant prior studies available.    FINDINGS:      VASCULATURE:    Aorta:  No acute findings.  No aortic aneurysm or dissection.    Celiac trunk and mesenteric arteries:  Mild atherosclerosis of the   origin of the celiac trunk, SMA and BETZY without significant stenosis.    Renal arteries:  No acute findings.  No occlusion or significant   stenosis.    Iliac arteries:  Moderate atherosclerosis of the bilateral common iliac   arteries, external iliac arteries and internal iliac arteries.  No   occlusion or significant stenosis.      Lung bases:  Unremarkable.  No mass.  No consolidation.     ABDOMEN:    Liver:  Nodular contour of the liver is concerning for cirrhosis.    Gallbladder and bile ducts:  Cholecystectomy.  No ductal dilation.    Pancreas:  Unremarkable.  No ductal dilation.  No mass.    Spleen:  Splenomegaly.  The spleen is heterogeneous which could   represent a perfusion anomaly.    Adrenals:  Unremarkable.  No mass.    Kidneys and ureters:  Unremarkable.  No hydronephrosis.  No solid mass.    Stomach and bowel:  Diverticulosis.  No obstruction.  No mucosal   thickening.     PELVIS:    Appendix:  No findings to suggest acute appendicitis.    Bladder:  Unremarkable.  No mass.    Reproductive:  Unremarkable as visualized.     ABDOMEN and PELVIS:    Intraperitoneal space:  Unremarkable.  No significant fluid collection.    No free air.    Bones joints:  There are degenerative changes of the spine.  No acute   fracture.  No dislocation.    Soft tissues:  Unremarkable.    Lymph nodes:  Unremarkable.  No enlarged lymph nodes.    IMPRESSION:       1.  No aortic aneurysm or dissection.  2.  Nodular contour of the liver is concerning for cirrhosis.  3.  The spleen is heterogeneous which could represent a perfusion anomaly.    4.  Splenomegaly is nonspecific but concerning for portal hypertension.  5.  Diverticulosis.      Impression:          Electronically signed by Yuki Starr MD on 11-24-23 at 0124    CT Angiogram Chest [[id number]] Collected: 11/24/23 0122      Updated: 11/24/23 0122    Narrative:        Patient: MARIBELL RUGGIERO  Time Out: 01:22  Exam(s): CTA CHEST     EXAM:    CT Angiography Chest With Intravenous Contrast    CLINICAL HISTORY:       Concern for acute aortic syndrome.    TECHNIQUE:    Axial computed tomographic angiography images of the chest with   intravenous contrast.  CTDI is 37 mGy and DLP is 934 mGy-cm.  This CT   exam was performed according to the principle of ALARA (As Low As   Reasonably Achievable) by using one or more of the following dose   reduction techniques: automated exposure control, adjustment of the mA   and or kV according to patient size, and or use of iterative   reconstruction technique.    MIP reconstructed images were created and reviewed.    COMPARISON:    No relevant prior studies available.    FINDINGS:    Pulmonary arteries:  Unremarkable.  No pulmonary embolus.    Aorta:  Mild atherosclerosis.  No aortic aneurysm or dissection.    Lungs:  Diffuse bilateral airspace opacities are concerning for   multifocal pneumonia and or aspiration.  Cannot exclude superimposed   pulmonary edema.    Pleural space:  Unremarkable.  No significant effusion.  No   pneumothorax.    Heart:  Unremarkable.  No cardiomegaly.  No significant pericardial   effusion.  No evidence of RV dysfunction.    Bones joints:  There are degenerative changes of the spine.  No acute   fracture.    Soft tissues:  Unremarkable.    Lymph nodes:  Unremarkable.  No enlarged lymph nodes.    IMPRESSION:       1.  No aortic aneurysm or dissection.  2.  No pulmonary embolus.  3.  Diffuse bilateral airspace opacities are concerning for multifocal   pneumonia and or aspiration.  Cannot exclude superimposed pulmonary edema.      Impression:          Electronically signed by Yuki Starr MD on 11-24-23 at 0122    XR Chest 1 View [[id number]] Collected: 11/24/23 0100     Updated: 11/24/23 0100    Narrative:        Patient: MARIBELL RUGGIERO  Time Out: 01:00  Exam(s): XR CXR 1  VIEW     EXAM:    XR Chest, 1 View    CLINICAL HISTORY:       Nausea and upper abd pain.    TECHNIQUE:    Frontal view of the chest.    COMPARISON:    Chest radiograph 11 20 2023    FINDINGS:    Lungs:  Significantly worsened bilateral airspace opacities.    Pleural space:  New small left pleural effusion.  No pneumothorax.    Heart:  Unremarkable.  No cardiomegaly.    Mediastinum:  Unremarkable.  Normal mediastinal contour.    Bones joints:  There are degenerative changes of the spine.  No acute   fracture.    IMPRESSION:       1.  Significantly worsened bilateral airspace opacities.  2.  New small left pleural effusion.      Impression:          Electronically signed by Yuki Starr MD on 11-24-23 at 0100            Pertinent Labs     Results from last 7 days   Lab Units 12/03/23 0433 12/02/23 0508 12/01/23 0819 11/30/23  0421   WBC 10*3/mm3 97.89* 81.65* 85.16* 78.89*   HEMOGLOBIN g/dL 10.7* 9.5* 10.6* 8.4*   PLATELETS 10*3/mm3 381 299 301 285     Results from last 7 days   Lab Units 12/03/23 0433 12/02/23 0508 12/01/23 0819 11/30/23  0421   SODIUM mmol/L 133* 136 137 139   POTASSIUM mmol/L 4.1 4.5 3.0* 4.0   CHLORIDE mmol/L 89* 89* 88* 96*   CO2 mmol/L 27.4 29.0 29.6* 24.9   BUN mg/dL 22 19 14 21   CREATININE mg/dL 0.99 0.91 0.91 0.93   GLUCOSE mg/dL 78 84 115* 202*   Estimated Creatinine Clearance: 36.3 mL/min (by C-G formula based on SCr of 0.99 mg/dL).  Results from last 7 days   Lab Units 11/30/23  0421   ALBUMIN g/dL 3.0*   BILIRUBIN mg/dL 0.9   ALK PHOS U/L 155*   AST (SGOT) U/L 15   ALT (SGPT) U/L 12     Results from last 7 days   Lab Units 12/03/23 0433 12/02/23  0508 12/01/23 0819 11/30/23  0421   CALCIUM mg/dL 8.7 8.3* 8.1* 7.6*   ALBUMIN g/dL  --   --   --  3.0*   MAGNESIUM mg/dL  --   --  2.2 1.7       Results from last 7 days   Lab Units 11/30/23  0421 11/29/23  1108 11/29/23  0745 11/29/23  0427   HSTROP T ng/L 27* 29* 32* 27*   PROBNP pg/mL  --   --  8,066.0*  --            Invalid  "input(s): \"LDLCALC\"        Test Results Pending at Discharge       Discharge Details        Discharge Medications        ASK your doctor about these medications        Instructions Start Date   acetaminophen 500 MG tablet  Commonly known as: TYLENOL   500 mg, Oral, As Needed      apixaban 5 MG tablet tablet  Commonly known as: ELIQUIS   5 mg, Oral, 2 Times Daily      aspirin 81 MG EC tablet   81 mg, Oral, Daily      bisacodyl 10 MG suppository  Commonly known as: DULCOLAX   10 mg, Rectal, Daily PRN      budesonide-formoterol 160-4.5 MCG/ACT inhaler  Commonly known as: SYMBICORT   2 puffs, Inhalation, 2 Times Daily - RT      cadexomer iodine 0.9 % gel  Commonly known as: IODOSORB   1 application , Topical, Daily      famotidine 20 MG tablet  Commonly known as: PEPCID   20 mg, Oral, Daily      furosemide 20 MG tablet  Commonly known as: LASIX   10 mg, Oral, Daily      Gas Relief 80 MG chewable tablet  Generic drug: simethicone   Chew 1 tablet by mouth 4 (Four) Times a Day As Needed for Flatulence.      latanoprost 0.005 % ophthalmic solution  Commonly known as: XALATAN   1 drop, Both Eyes      metoprolol succinate XL 25 MG 24 hr tablet  Commonly known as: TOPROL-XL   25 mg, Oral, Daily      Momelotinib Dihydrochloride 200 MG tablet  Commonly known as: OJJAARA   200 mg, Oral, Daily      polyethylene glycol 17 g packet  Commonly known as: MIRALAX   17 g, Oral, Daily PRN      predniSONE 5 MG tablet  Commonly known as: DELTASONE   Take 4 tablets by mouth Daily With Breakfast for 6 days, THEN 3 tablets Daily With Breakfast for 7 days, THEN 2 tablets Daily With Breakfast for 7 days, THEN 1 tablet Daily With Breakfast for 7 days.   Start Date: November 23, 2023     saccharomyces boulardii 250 MG capsule  Commonly known as: FLORASTOR   250 mg, Oral, 2 Times Daily      sertraline 100 MG tablet  Commonly known as: ZOLOFT   100 mg, Oral, Daily      tiotropium bromide monohydrate 2.5 MCG/ACT aerosol solution inhaler  Commonly " known as: SPIRIVA RESPIMAT   2 puffs, Inhalation, Daily - RT               Allergies   Allergen Reactions    Aspirin Unknown - Low Severity    Diphenhydramine Hcl Anxiety     Benadryl IVP    Hydroxyurea Other (See Comments)     Her hemoglobin drop and was stop in February 2022 and start taking Jakafi    Oxycodone Unknown - Low Severity         Discharge Disposition:  Hospice/Medical Facility ([facility])    Discharge Diet:  Diet Order   Procedures    Diet: Regular/House Diet; Texture: Regular Texture (IDDSI 7); Fluid Consistency: Thin (IDDSI 0)       Discharge Activity:       CODE STATUS:    Code Status and Medical Interventions:   Ordered at: 12/03/23 1346     Code Status (Patient has no pulse and is not breathing):    No CPR (Do Not Attempt to Resuscitate)     Medical Interventions (Patient has pulse or is breathing):    Comfort Measures       Future Appointments   Date Time Provider Department Center   12/20/2023  9:40 AM LAB CHAIR 1 [facility]  [facility]   12/20/2023 10:00 AM Darrell Reinoso MD [facility] CBC [facility]   1/3/2024 10:00 AM Danisha Reinoso MD [facility] ID [facility]   4/18/2024  1:45 PM Albin Barriga MD [facility] CD [facility]      Follow-up Information       Brett Jackson MD .    Specialty: Internal Medicine  Contact information:  [place] [facility] [address]  [phone]                             Time Spent on Discharge:  I spent greater than 30 minutes on this discharge activity which included: face-to-face encounter with the patient, reviewing the data in the system, coordination of the care with the nursing staff as well as consultants, documentation, and entering orders.       Chandni Luo MD  [facility]  12/05/23  17:27 EST                Electronically signed by Chandni Luo MD at 12/05/23 1826

## 2023-12-12 NOTE — PROGRESS NOTES
Case Management Discharge Note      Final Note: The patient  on 12/10/23 @ 08:30. FORTINO Jensen RN, CCP.         Selected Continued Care - Discharged on 12/10/2023 Admission date: 2023 - Discharge disposition:       Destination Coordination complete.      Service Provider Selected Services Address Phone Fax Patient Preferred    [facility] [address], [facility] [address] [phone] --              Durable Medical Equipment    No services have been selected for the patient.                Dialysis/Infusion    No services have been selected for the patient.                Home Medical Care    No services have been selected for the patient.                Therapy    No services have been selected for the patient.                Community Resources    No services have been selected for the patient.                [facility]    No services have been selected for the patient.                    Selected Continued Care - Prior Encounters Includes continued care and service providers with selected services from prior encounters from 2023 to 12/10/2023      Discharged on 2023 Admission date: 2023 - Discharge disposition: Skilled Nursing Facility (DC - External)      Destination       Service Provider Selected Services Address Phone Fax Patient Preferred    [address] [address] [phone] --              Home Medical Care       Service Provider Selected Services Address Phone Fax Patient Preferred    [facility] [address], [address], [facility] [id number] [phone] --                      Discharged on 10/2/2023 Admission date: 2023 - Discharge disposition: Home-Health Care [facility]      Home Medical Care       Service Provider Selected Services Address Phone Fax Patient Preferred     [facility] [address], [facility] [id number] [phone] --                               Final Discharge Disposition Code: 41 -  in medical facility

## 2023-12-13 LAB
MYCOBACTERIUM SPEC CULT: NORMAL
NIGHT BLUE STAIN TISS: NORMAL

## 2023-12-20 LAB
MYCOBACTERIUM SPEC CULT: NORMAL
NIGHT BLUE STAIN TISS: NORMAL

## 2024-06-22 NOTE — CONSULTS
Nutrition Education    Consulted for CHF diet education. Provided pt with written and verbal instruction on HF nutrition therapy. Discussed low sodium diet, daily weights, and fluid restriction. Pt prepares her own meals, no additional salt added. Enjoys snacking on salty snacks, encouraged pt to read labels and buy low sodium alternatives. Discussed lower sodium cheese options and frozen meals to promote better diet compliance. Pt appears willing to make diet changes. Will monitor for further nutrition questions. Time spent: 10 minutes      · Verbally reviewed information with Patient  · Educated on CHF  · Written educational materials provided. · Contact name and number provided. · Refer to Patient Education activity for more details.     Electronically signed by Jacques Rankin, MS, RD, LD on 3/17/22 at 3:43 PM EDT    Contact: [id number] González Hooper MD on 6/16/2024  1:18 PM                 Discharge Medication List as of 6/17/2024  1:07 PM        CONTINUE these medications which have NOT CHANGED    Details   hydrALAZINE (APRESOLINE) 10 MG tablet Take 2.5 tablets by mouth in the morning and at bedtimeHistorical Med      atorvastatin (LIPITOR) 40 MG tablet Take by mouth dailyHistorical Med      lisinopril (PRINIVIL;ZESTRIL) 10 MG tablet Take 1 tablet by mouth dailyHistorical Med           STOP taking these medications       gabapentin (NEURONTIN) 600 MG tablet Comments:   Reason for Stopping:         hydroCHLOROthiazide (HYDRODIURIL) 25 MG tablet Comments:   Reason for Stopping:         metFORMIN (GLUCOPHAGE) 850 MG tablet Comments:   Reason for Stopping:         rOPINIRole (REQUIP) 2 MG tablet Comments:   Reason for Stopping:         zolpidem (AMBIEN) 5 MG tablet Comments:   Reason for Stopping:                  Activity: activity as tolerated    Diet: cardiac diet    Wound Care: none needed      Anderson Sosa MD  6/22/2024, 2:47 PM    Total time spent 28 mins  Disclaimer: Sections of this note are dictated using utilizing voice recognition software.  Minor typographical errors may be present. If questions arise, please do not hesitate to contact me or call our department.

## 2024-11-05 NOTE — SIGNIFICANT NOTE
11/29/23 1420   OTHER   Discipline physical therapist   Rehab Time/Intention   Session Not Performed other (see comments)  (PT eval order received, pt with rapid response this morning due to episode of chest pain, now down for cardiac cath. discussed with KARRI Cordero, will f/u tomorrow)   Recommendation   PT - Next Appointment 11/30/23       
   12/01/23 1447   OTHER   Discipline physical therapist   Rehab Time/Intention   Session Not Performed patient/family declined treatment  (patient declined PT today, states she is just too tired. PT will f/u on Monday 12/4, notified KARRI Desai)   Recommendation   PT - Next Appointment 12/04/23       
   12/01/23 3456   OTHER   Discipline occupational therapist   Rehab Time/Intention   Session Not Performed other (see comments)  (Pt c/o not sleeping well last night and declines to participate in OOB act d/t being too tired. Pt requests OT f/u next service date.)   Therapy Assessment/Plan (PT)   Criteria for Skilled Interventions Met (PT) yes;skilled treatment is necessary   Recommendation   OT - Next Appointment 12/04/23       
"   11/29/23 1810   PICC Single Lumen 11/29/23 Left Brachial   Placement date: If unknown, DO NOT use \"Add Comment\" note/Placement time: If unknown, DO NOT use \"Add Comment\" note: 11/29/23 1809   Hand Hygiene Completed: Yes  Size (Fr): 4  Description (optional): PICC  Length (cm): 45 cm  Orientation: Left  Locati...   Site Assessment Clean;Dry;Intact   #1 Lumen Status Blood return noted;Capped;Flushed;Normal saline locked   Length andres (cm) 1 cm   Line Care Connections checked and tightened   Extremity Circumference (cm) 26 cm   Dressing Type Border Dressing;Transparent;Securing device;Antimicrobial dressing/disc   Dressing Status Clean;Dry;Intact   Dressing Intervention New dressing   Liquid Adhesive Applied   Dressing Change Due 12/06/23   Indication/Daily Review of Necessity long-term IV access >7 days     LOT: [id number]  EXP: 12/31/2024    3 needles, 2 guide wires, 1 scalpel accounted for at procedure end.     PICC line successful with 3CG confirmation. Primary RN notified that line may be used       "
This was a shared visit with the JOSY. I reviewed and verified the documentation.

## 2025-05-15 NOTE — PLAN OF CARE
Goal Outcome Evaluation:  Plan of Care Reviewed With: patient, family        Progress: declining  Outcome Evaluation: PPS 30%. Pt medicated with one dose of ativan for anxiety. Pt resting comfortably, family at bedside. PICC line dressing changed. No needs at this time.          DISPLAY PLAN FREE TEXT

## (undated) DEVICE — ENDOPATH XCEL BLADELESS TROCARS WITH STABILITY SLEEVES: Brand: ENDOPATH XCEL

## (undated) DEVICE — Device: Brand: GUIDELINER® V3 CATHETER

## (undated) DEVICE — ENSEAL X1 TISSUE SEALER, CURVED JAW, 37 CM SHAFT LENGTH: Brand: ENSEAL

## (undated) DEVICE — ADAPT CLN BIOGUARD AIR/H2O DISP

## (undated) DEVICE — ADAPT SWVL FIBROPTIC BRONCH

## (undated) DEVICE — JACKSON-PRATT 100CC BULB RESERVOIR: Brand: CARDINAL HEALTH

## (undated) DEVICE — GAS SAMPLING LINE,10,MM,0.047ID,CO-EX: Brand: MEDLINE

## (undated) DEVICE — PK CATH CARD 40

## (undated) DEVICE — SINGLE USE BIOPSY VALVE MAJ-210: Brand: SINGLE USE BIOPSY VALVE (STERILE)

## (undated) DEVICE — BITEBLOCK OMNI BLOC

## (undated) DEVICE — RADIFOCUS GLIDEWIRE: Brand: GLIDEWIRE

## (undated) DEVICE — STPCK 3WY D201 DISCOFIX

## (undated) DEVICE — LN SMPL CO2 SHTRM SD STREAM W/M LUER

## (undated) DEVICE — GLIDESHEATH SLENDER STAINLESS STEEL KIT: Brand: GLIDESHEATH SLENDER

## (undated) DEVICE — SENSR O2 OXIMAX FNGR A/ 18IN NONSTR

## (undated) DEVICE — 3M™ STERI-STRIP™ COMPOUND BENZOIN TINCTURE 40 BAGS/CARTON 4 CARTONS/CASE C1544: Brand: 3M™ STERI-STRIP™

## (undated) DEVICE — Device: Brand: PROWATER

## (undated) DEVICE — GLV SURG PREMIERPRO ORTHO LTX PF SZ7.5 BRN

## (undated) DEVICE — CATH DIAG IMPULSE FL3.5 5F 100CM

## (undated) DEVICE — DISPOSABLE MONOPOLAR ENDOSCOPIC CORD 10 FT. (3M): Brand: KIRWAN

## (undated) DEVICE — TUBING, SUCTION, 1/4" X 10', STRAIGHT: Brand: MEDLINE

## (undated) DEVICE — CATH IV INSYTE AUTOGARD 14G 1 1/2IN ORNG

## (undated) DEVICE — KT MANIFLD CARDIAC

## (undated) DEVICE — RADIFOCUS OPTITORQUE ANGIOGRAPHIC CATHETER: Brand: OPTITORQUE

## (undated) DEVICE — CANN O2 ETCO2 FITS ALL CONN CO2 SMPL A/ 7IN DISP LF

## (undated) DEVICE — TRAP,MUCUS SPECIMEN, 80CC: Brand: MEDLINE

## (undated) DEVICE — APPL CHLORAPREP HI/LITE 26ML ORNG

## (undated) DEVICE — KT ORCA ORCAPOD DISP STRL

## (undated) DEVICE — GAUZE,SPONGE,4"X4",16PLY,STRL,LF,10/TRAY: Brand: MEDLINE

## (undated) DEVICE — CATH DIAG IMPULSE FR4 5F 100CM

## (undated) DEVICE — LAPAROVUE VISIBILITY SYSTEM LAPAROSCOPIC SOLUTIONS: Brand: LAPAROVUE

## (undated) DEVICE — Device: Brand: DEFENDO AIR/WATER/SUCTION AND BIOPSY VALVE

## (undated) DEVICE — GW EMR FIX EXCHG J STD .035 3MM 260CM

## (undated) DEVICE — Device

## (undated) DEVICE — SOL NACL 0.9PCT 1000ML

## (undated) DEVICE — DRSNG SURESITE WNDW 4X4.5

## (undated) DEVICE — MSK AIRWY LARYNG LMA PILOT SZ4

## (undated) DEVICE — ENDOPATH XCEL UNIVERSAL TROCAR STABLILITY SLEEVES: Brand: ENDOPATH XCEL

## (undated) DEVICE — MSK PROC CURAPLEX O2 2/ADAPT 7FT

## (undated) DEVICE — SUT MNCRYL PLS ANTIB UD 4/0 PS2 18IN

## (undated) DEVICE — ENDOPOUCH RETRIEVER SPECIMEN RETRIEVAL BAGS: Brand: ENDOPOUCH RETRIEVER

## (undated) DEVICE — GC 5F 056 JL 3.5 LBT: Brand: BRITE TIP

## (undated) DEVICE — FRCP BX RADJAW4 PULM WO NDL STD1.8X2 100

## (undated) DEVICE — SINGLE USE SUCTION VALVE MAJ-209: Brand: SINGLE USE SUCTION VALVE (STERILE)

## (undated) DEVICE — EXTENSION SET, MALE LUER LOCK ADAPTER WITH RETRACTABLE COLLAR

## (undated) DEVICE — CONTAINER,SPECIMEN,OR STERILE,4OZ: Brand: MEDLINE

## (undated) DEVICE — ANGIOSCULPT EVO RX PTCA SCORING BALLOON CATHETER WITH HYDROPHILIC COATING, 2.5 MM X 10 MM: Brand: ANGIOSCULPT EVO

## (undated) DEVICE — BLCK/BITE BLOX W/DENTL/RIM W/STRAP 54F

## (undated) DEVICE — LOU LAP CHOLE: Brand: MEDLINE INDUSTRIES, INC.

## (undated) DEVICE — SUT VIC 0 TN 27IN DYED JTN0G

## (undated) DEVICE — GW HITORQUE/BAL MID/WT J W/HCOAT .014 3X190CM

## (undated) DEVICE — DRAPE,REIN 53X77,STERILE: Brand: MEDLINE

## (undated) DEVICE — VITAL SIGNS™ JACKSON-REES CIRCUITS: Brand: VITAL SIGNS™

## (undated) DEVICE — LAPAROSCOPIC SMOKE FILTRATION SYSTEM: Brand: PALL LAPAROSHIELD® PLUS LAPAROSCOPIC SMOKE FILTRATION SYSTEM

## (undated) DEVICE — CATH CHOLANG 4.5F18IN BRGNDY

## (undated) DEVICE — ENDOCUT SCISSOR TIP, DISPOSABLE: Brand: RENEW

## (undated) DEVICE — FRCP BX RADJAW4 NDL 2.8 240CM LG OG BX40

## (undated) DEVICE — CATH VENT MIV RADL PIG ST TIP 5F 110CM

## (undated) DEVICE — TREK CORONARY DILATATION CATHETER 2.25 MM X 15 MM / RAPID-EXCHANGE: Brand: TREK

## (undated) DEVICE — ENDOPATH PNEUMONEEDLE INSUFFLATION NEEDLES WITH LUER LOCK CONNECTORS 120MM: Brand: ENDOPATH

## (undated) DEVICE — SUT ETHLN 2/0 PS 18IN 585H

## (undated) DEVICE — DRP C/ARM 41X74IN